# Patient Record
Sex: FEMALE | Race: WHITE | NOT HISPANIC OR LATINO | Employment: OTHER | URBAN - METROPOLITAN AREA
[De-identification: names, ages, dates, MRNs, and addresses within clinical notes are randomized per-mention and may not be internally consistent; named-entity substitution may affect disease eponyms.]

---

## 2017-01-01 ENCOUNTER — APPOINTMENT (OUTPATIENT)
Dept: OCCUPATIONAL THERAPY | Facility: CLINIC | Age: 77
DRG: 280 | End: 2017-01-01
Payer: MEDICARE

## 2017-01-02 ENCOUNTER — TELEPHONE (OUTPATIENT)
Dept: PHARMACY | Facility: OTHER | Age: 77
End: 2017-01-02

## 2017-01-02 NOTE — TELEPHONE ENCOUNTER
MTM referral from: Transitions of Care (recent hospital discharge or ED visit)    MTM referral outreach attempt #1 on January 2, 2017 at 11:02 AM      Outcome: Patient is not interested at this time because they are a non fv patient, will route to MTM Pharmacist/Provider as an FYI. Thank you for the referral.     Deborah Ortiz, MTM Coordinator

## 2017-01-03 ENCOUNTER — TELEPHONE (OUTPATIENT)
Dept: UROLOGY | Facility: CLINIC | Age: 77
End: 2017-01-03

## 2017-01-03 NOTE — TELEPHONE ENCOUNTER
"Called patient to discuss her concerns re: urinary frequency, urgency, incontinence, possible UTI. I initially saw her in clinic on 12/23/16 (see progress note for further details) at which time her UA appeared positive for a UTI. I had recommended initiating empiric treatment with antibiotics that day but patient was concerned about the cost of medication and elected to wait until final urine culture results were available to ensure appropriate antibiotic selection. In complying with her wishes, I had asked nursing staff to alert patient of need for Macrobid 100 mg PO BID x 7 days on 12/27/16 (see result note from urine culture from 12/23/16) when culture returned with >100,000 E. coli. It appears this message was not relayed to the patient until 12/28/16 at which time she then picked up the Macrobid and started taking it. Then on 12/29/16, she was admitted for acute CHF, possible acute anterior non-STEMI for which she was hospitalized through 1/1/17. A UA was checked on 12/29/16 which was actually completley normal so her Macrobid was stopped. She apparently received 3 days of IV ceftriaxone while hospitalized.     She calls today with complaints of continuing frequency, urgency, and a feeling of \"a bowling ball dropping from her abdomen when she stands up.\" As we had not performed any additional exam or workup per patient request at her previous office visit, I did recommend she return to clinic for further evaluation including a pelvic exam to look for possible pelvic organ prolapse. We are also awaiting medical records from [facility] that detail what medications she has previously tried for her urinary urgency, urge incontinence, urinary tract infections, to help guide further workup and treatment options. I also recommended that if she continues to have concern for a UTI, she should leave a new urine sample (optimally, a catheterized sample would be best) to check for presence of an " "infection. She may have this done at our clinic or any other [place] clinic, but the patient states she will \"just have it done when I see my primary doctor for follow up of my hospitalization.\" Informed patient to please have results faxed to us to review as well.    Recommend patient schedule a follow up visit to be seen, but she prefers to wait until records have been faxed and after she sees her primary doctor. She will call back when she is ready to schedule a follow up appointment. Patient verbalized understanding and agreement. All of her questions were answered.    Azra Arnold PA-C  Urology  "

## 2017-01-05 ENCOUNTER — TRANSFERRED RECORDS (OUTPATIENT)
Dept: CARDIOLOGY | Facility: CLINIC | Age: 77
End: 2017-01-05

## 2017-01-05 LAB
ALBUMIN SERPL-MCNC: 3.5 G/DL
ALP SERPL-CCNC: 92 U/L
ALT SERPL-CCNC: 51 U/L
ANION GAP SERPL CALCULATED.3IONS-SCNC: NORMAL MMOL/L
AST SERPL-CCNC: 44 U/L
BILIRUB SERPL-MCNC: 0.84 MG/DL
BNP SERPL-MCNC: 405 NUMERIC
BUN SERPL-MCNC: 15 MG/DL
CALCIUM SERPL-MCNC: 9.3 MG/DL
CHLORIDE SERPLBLD-SCNC: 101 MMOL/L
CO2 SERPL-SCNC: NORMAL MMOL/L
CREAT SERPL-MCNC: 1 MG/DL
ERYTHROCYTE [DISTWIDTH] IN BLOOD BY AUTOMATED COUNT: NORMAL %
GFR SERPL CREATININE-BSD FRML MDRD: NORMAL ML/MIN/1.73M2
GLUCOSE SERPL-MCNC: 130 MG/DL (ref 70–99)
HCT VFR BLD AUTO: 41.8 %
HEMOGLOBIN: 13.8 G/DL
MCH RBC QN AUTO: 28.2 PG
MCHC RBC AUTO-ENTMCNC: 33 G/DL
MCV RBC AUTO: 85.5 FL
PLATELET # BLD AUTO: 297 10^9/L
POTASSIUM SERPL-SCNC: 4.4 MMOL/L
PROT SERPL-MCNC: 7.9 G/DL
RBC # BLD AUTO: 4.89 10^12/L
SODIUM SERPL-SCNC: 139 MMOL/L
WBC # BLD AUTO: 10.9 10^9/L

## 2017-01-06 ENCOUNTER — TRANSFERRED RECORDS (OUTPATIENT)
Dept: CARDIOLOGY | Facility: CLINIC | Age: 77
End: 2017-01-06

## 2017-02-13 ENCOUNTER — TELEPHONE (OUTPATIENT)
Dept: CARDIOLOGY | Facility: CLINIC | Age: 77
End: 2017-02-13

## 2017-02-24 ENCOUNTER — TELEPHONE (OUTPATIENT)
Dept: CARDIOLOGY | Facility: CLINIC | Age: 77
End: 2017-02-24

## 2017-02-24 DIAGNOSIS — I50.33 ACUTE ON CHRONIC DIASTOLIC (CONGESTIVE) HEART FAILURE (H): ICD-10-CM

## 2017-02-24 RX ORDER — METOPROLOL TARTRATE 25 MG/1
25 TABLET, FILM COATED ORAL 2 TIMES DAILY
Qty: 60 TABLET | Refills: 0 | Status: SHIPPED | OUTPATIENT
Start: 2017-02-24 | End: 2017-05-04

## 2017-02-24 NOTE — TELEPHONE ENCOUNTER
Notified by scheduling that patient had rescheduled numerous times for her 1 month hospital f/u appointment due 2/1/17. Called patient and she said that she did not make it to previous appointments because she does not have a car. It costs her $12 by [employer] and sometimes she does not have the money. She said she has also rescheduled due to a yeast infection. She did not have $12 for her appointment today because she was planning on a snow storm. Said that she did f/u with her PCP with [facility]. She asked them about decreasing her Lasix dose, but she said they deferred to cardiology. Discharge weight was 293# and home weight is reportedly stable at 280#. She is not short of breath which she says was her main sx leading to hospitalization. Rescheduled her office with with Dr. Geller to next opening. Will refill her lopressor x 1 to get her to the office visit. Encouraged her to keep this appointment. Mary HORTON

## 2017-03-27 ENCOUNTER — PRE VISIT (OUTPATIENT)
Dept: CARDIOLOGY | Facility: CLINIC | Age: 77
End: 2017-03-27

## 2017-03-30 ENCOUNTER — OFFICE VISIT (OUTPATIENT)
Dept: CARDIOLOGY | Facility: CLINIC | Age: 77
End: 2017-03-30
Payer: MEDICARE

## 2017-03-30 VITALS
HEART RATE: 74 BPM | HEIGHT: 68 IN | OXYGEN SATURATION: 95 % | DIASTOLIC BLOOD PRESSURE: 72 MMHG | BODY MASS INDEX: 43.8 KG/M2 | SYSTOLIC BLOOD PRESSURE: 122 MMHG | WEIGHT: 289 LBS

## 2017-03-30 DIAGNOSIS — I50.33 ACUTE ON CHRONIC DIASTOLIC (CONGESTIVE) HEART FAILURE (H): ICD-10-CM

## 2017-03-30 DIAGNOSIS — I42.9 CARDIOMYOPATHY, UNSPECIFIED (H): Primary | ICD-10-CM

## 2017-03-30 PROCEDURE — 99214 OFFICE O/P EST MOD 30 MIN: CPT | Performed by: INTERNAL MEDICINE

## 2017-03-30 RX ORDER — FUROSEMIDE 20 MG
10 TABLET ORAL DAILY PRN
Qty: 30 TABLET | Refills: 3 | Status: SHIPPED | OUTPATIENT
Start: 2017-03-30 | End: 2017-05-04

## 2017-03-30 RX ORDER — DIPHENHYDRAMINE HYDROCHLORIDE 25 MG/1
TABLET ORAL
COMMUNITY
End: 2019-10-18

## 2017-03-30 RX ORDER — TRIAMCINOLONE ACETONIDE 1 MG/G
CREAM TOPICAL 2 TIMES DAILY
COMMUNITY
End: 2019-10-18

## 2017-03-30 RX ORDER — POTASSIUM CHLORIDE 750 MG/1
10 TABLET, EXTENDED RELEASE ORAL DAILY PRN
Qty: 60 TABLET | Refills: 11 | Status: SHIPPED | OUTPATIENT
Start: 2017-03-30 | End: 2017-05-04

## 2017-03-30 NOTE — MR AVS SNAPSHOT
After Visit Summary   3/30/2017    Savanna Rehman    MRN: [id number]           Patient Information     Date Of Birth          1940        Visit Information        Provider Department      3/30/2017 2:15 PM Dottie Geller MD [facility] HEART [facility]        Today's Diagnoses     Cardiomyopathy, unspecified (H)    -  1    Acute on chronic diastolic (congestive) heart failure (H)           Follow-ups after your visit        Additional Services     Follow-Up with Cardiac Advanced Practice Provider           Follow-Up with Cardiologist                 Your next 10 appointments already scheduled     Apr 04, 2017 12:45 PM CDT   LAB with RU LAB   [facility] ([facility])    [address]  [facility] [address] [phone]           Patient must bring picture ID.  Patient should be prepared to give a urine specimen  Please do not eat 10-12 hours before your appointment if you are coming in fasting for labs on lipids, cholesterol, or glucose (sugar).  Pregnant women should follow their Care Team instructions. Water with medications is okay. Do not drink coffee or other fluids.   If you have concerns about taking  your medications, please ask at office or if scheduling via [employer], send a message by clicking on Secure Messaging, Message Your Care Team.            Apr 04, 2017  1:15 PM CDT   XR CHEST 2 VIEWS with [id number]   [facility] ([facility])    [address]  [place] [place] [address] [phone]           Please bring a list of your current medicines to your exam. (Include vitamins, minerals and over-thecounter medicines.) Leave your valuables at home.  Tell your doctor if there is a chance you may be pregnant.  You do not need to do anything special for this exam.            Apr 04, 2017  1:50 PM CDT   Return Visit with Maral LAIRD  ANNA Lora   [facility] PHYS[facility] HEART AT [place] ([facility])    [address]  [facility] [id number] [phone]            Apr 06, 2017  1:00 PM CDT   MR MYOCARDIUM W CONTRAST with SCIMR1   [facility] (Cardiovascular Imaging at [facility])    [address]  [address]  [place] [address] [phone]           Take your medicines as usual, unless your doctor tells you not to. Bring a list of your current medicines to your exam (including vitamins, minerals and over-the-counter drugs).  You will be given intravenous contrast for this exam. To prepare:   The day before your exam, drink extra fluids at least six 8-ounce glasses (unless your doctor tells you to restrict your fluids).   Have a blood test (creatinine test) within 30 days of your exam. Go to your clinic or Diagnostic Imaging Department for this test.  The MRI machine uses a strong magnet. Please wear clothes without metal (snaps, zippers). A sweatsuit works well, or we may give you a hospital gown.  Please remove any body piercings and hair extensions before you arrive. You will also remove watches, jewelry, hairpins, wallets, dentures, partial dental plates and hearing aids. You may wear contact lenses, and you may be able to wear your rings. We have a safe place to keep your personal items, but it is safer to leave them at home.   **IMPORTANT** THE INSTRUCTIONS BELOW ARE ONLY FOR THOSE PATIENTS WHO HAVE BEEN TOLD THEY WILL RECEIVE SEDATION OR GENERAL ANESTHESIA DURING THEIR MRI PROCEDURE:  IF YOU WILL RECEIVE SEDATION (take medicine to help you relax during your exam):   You must get the medicine from your doctor before you arrive. Bring the medicine to the exam. Do not take it at home.   Arrive one hour early. Bring someone who can take you home after the test. Your medicine will make you sleepy. After the exam, you may not drive, take a bus or take a taxi  by yourself.   No eating 8 hours before your exam. You may have clear liquids up until 4 hours before your exam. (Clear liquids include water, clear tea, black coffee and fruit juice without pulp.)  IF YOU WILL RECEIVE ANESTHESIA (be asleep for your exam):   Arrive 1 1/2 hours early. Bring someone who can take you home after the test. You may not drive, take a bus or take a taxi by yourself.   No eating 8 hours before your exam. You may have clear liquids up until 4 hours before your exam. (Clear liquids include water, clear tea, black coffee and fruit juice without pulp.)  Please call the Imaging Department at your exam site with any questions.            Apr 14, 2017  8:15 AM CDT   Return Visit with Dottie Geller MD   [facility] PHYSICIANS HEART AT [place] ([facility])    [address] [place] [address] [phone]              Future tests that were ordered for you today     Open Future Orders        Priority Expected Expires Ordered    X-ray Chest 2 vws* Routine 3/30/2017 3/30/2018 3/30/2017    Follow-Up with Cardiologist Routine 4/10/2017 3/30/2019 3/30/2017    Follow-Up with Cardiac Advanced Practice Provider Routine 4/3/2017 3/30/2019 3/30/2017    Basic metabolic panel Routine 4/3/2017 3/30/2019 3/30/2017    MRI Cardiac w/contrast Routine 3/31/2017 3/30/2018 3/30/2017            Who to contact     If you have questions or need follow up information about today's clinic visit or your schedule please contact [facility] PHYS[facility] HE[facility] AT [place] directly at [phone].  Normal or non-critical lab and imaging results will be communicated to you by MyChart, letter or phone within 4 business days after the clinic has received the results. If you do not hear from us within 7 days, please contact the clinic through MyChart or phone. If you have a critical or abnormal lab result, we will notify you by phone as soon as possible.  Submit refill  "requests through [employer] or call your pharmacy and they will forward the refill request to us. Please allow 3 business days for your refill to be completed.          Additional Information About Your Visit        [employer] Information     [employer] lets you send messages to your doctor, view your test results, renew your prescriptions, schedule appointments and more. To sign up, go to [email or web address] . Click on \"Log in\" on the left side of the screen, which will take you to the Welcome page. Then click on \"Sign up Now\" on the right side of the page.     You will be asked to enter the access code listed below, as well as some personal information. Please follow the directions to create your username and password.     Your access code is: [email or web address]  Expires: 2017  2:06 PM     Your access code will  in 90 days. If you need help or a new code, please call your [place] clinic or [phone].        Care EveryWhere ID     This is your Care EveryWhere ID. This could be used by other organizations to access your [place] medical records  [id number]        Your Vitals Were     Pulse Height Pulse Oximetry BMI (Body Mass Index)          74 1.727 m (5' 8\") 95% 43.94 kg/m2         Blood Pressure from Last 3 Encounters:   17 122/72   17 139/53   08/25/15 186/78    Weight from Last 3 Encounters:   17 131.1 kg (289 lb)   16 132.8 kg (292 lb 11.2 oz)   16 127 kg (280 lb)                 Today's Medication Changes          These changes are accurate as of: 3/30/17  4:05 PM.  If you have any questions, ask your nurse or doctor.               Start taking these medicines.        Dose/Directions    potassium chloride SA 10 MEQ CR tablet   Commonly known as:  K-DUR/KLOR-CON M   Used for:  Cardiomyopathy, unspecified (H)   Started by:  Dottie Geller MD        Dose:  10 mEq   Take 1 tablet (10 mEq) by mouth daily as needed for potassium supplementation (when take Lasix) "   Quantity:  60 tablet   Refills:  11         These medicines have changed or have updated prescriptions.        Dose/Directions    furosemide 20 MG tablet   Commonly known as:  LASIX   This may have changed:    - how much to take  - when to take this  - reasons to take this  - additional instructions   Used for:  Acute on chronic diastolic (congestive) heart failure (H)   Changed by:  Dottie Geller MD        Dose:  10 mg   Take 0.5 tablets (10 mg) by mouth daily as needed For weight gain and dyspnea   Quantity:  30 tablet   Refills:  3            Where to get your medicines      These medications were sent to [facility] [address] [facility] [address] [place] [address]  [address], [facility] [address]     Phone:  [phone]     furosemide 20 MG tablet    potassium chloride SA 10 MEQ CR tablet                Primary Care Provider Office Phone # Fax #    Hilary Finney [phone]       [facility] [address] [facility] [address]        Thank you!     Thank you for choosing [facility] PHYSIC[facility] HEART AT [place]  for your care. Our goal is always to provide you with excellent care. Hearing back from our patients is one way we can continue to improve our services. Please take a few minutes to complete the written survey that you may receive in the mail after your visit with us. Thank you!             Your Updated Medication List - Protect others around you: Learn how to safely use, store and throw away your medicines at www.disposemymeds.org.          This list is accurate as of: 3/30/17  4:05 PM.  Always use your most recent med list.                   Brand Name Dispense Instructions for use    ADVIL PO      Take 400 mg by mouth every 8 hours as needed for moderate pain       BIOTIN 5000 5 MG Caps   Generic drug:  biotin          furosemide 20 MG tablet    LASIX    30 tablet    Take 0.5 tablets (10  mg) by mouth daily as needed For weight gain and dyspnea       glipiZIDE 2.5 MG 24 hr tablet    GLUCOTROL XL     Take 2.5 mg by mouth daily       LORazepam 0.5 MG tablet    ATIVAN    12 tablet    Take 1 tablet (0.5 mg) by mouth every 4 hours as needed for anxiety or other (chest pain)       metoprolol 25 MG tablet    LOPRESSOR    60 tablet    Take 1 tablet (25 mg) by mouth 2 times daily       OXYBUTYNIN CHLORIDE PO      Take 5 mg by mouth 2 times daily Clarified as immediate release with [facility]./Patient       potassium chloride SA 10 MEQ CR tablet    K-DUR/KLOR-CON M    60 tablet    Take 1 tablet (10 mEq) by mouth daily as needed for potassium supplementation (when take Lasix)       pravastatin 20 MG tablet    PRAVACHOL     Take 20 mg by mouth At Bedtime       ranitidine 150 MG capsule    ZANTAC     Take by mouth 2 times daily       triamcinolone 0.1 % cream    KENALOG     Apply topically 2 times daily       * WARFARIN SODIUM PO      Take 2.5 mg by mouth Take on Mon, Tues, Wed, Thurs, Fri, Sat (6 days a week)       * WARFARIN SODIUM PO      Take 5 mg by mouth on Sundays       * Notice:  This list has 2 medication(s) that are the same as other medications prescribed for you. Read the directions carefully, and ask your doctor or other care provider to review them with you.

## 2017-03-30 NOTE — LETTER
3/30/2017    Hilary Finney MD  [facility]   [address]  [place] [address]    RE: Savanna Rehman       Dear Colleague,    I had the pleasure of seeing Savanna Rehman in the [facility].    REASON FOR VISIT:  New consultation following a hospitalization for hypoxemic and hypercapnic respiratory failure.      Ms. Rehman is a pleasant 76-year-old woman who is seen following a hospitalization originally with chronic atrial fibrillation with subtherapeutic INR on warfarin anticoagulation.  I had seen her when she was short of breath at presentation, was hypoxemic and had rapid ventricular response with elevated cardiac troponins.  There was new mid to distal anteroseptal hypokinesis described relative to prior to echocardiogram in 2015.  Cardiac catheterization was undertaken which showed no evidence of obstructive disease; however, it also confirmed the wall motion abnormalities on echo.      She has been doing well since discharge on medical therapy but just over the past few days has noticed shortness of breath on exertion and increased pedal edema.  She does not note a clear change in her weight on her home scale but there is an increase here.  She has had no chest discomfort.  She has no orthopnea or lightheadedness.  She has had certainly no syncope but does complain of some imbalance in the form of vertigo.  Again, she is short of breath on exertion and walks to the elevator and the parking lot at home and is very short of breath.      She does state that she had prior chest tightness but the Lorazepam helps.     Outpatient Encounter Prescriptions as of 3/30/2017   Medication Sig Dispense Refill     triamcinolone (KENALOG) 0.1 % cream Apply topically 2 times daily       biotin (BIOTIN 5000) 5 MG CAPS        [DISCONTINUED] potassium chloride SA (K-DUR/KLOR-CON M) 10 MEQ CR tablet Take 1 tablet (10 mEq) by mouth daily as needed for potassium supplementation  (when take Lasix) 60 tablet 11     [DISCONTINUED] furosemide (LASIX) 20 MG tablet Take 0.5 tablets (10 mg) by mouth daily as needed For weight gain and dyspnea 30 tablet 3     [DISCONTINUED] metoprolol (LOPRESSOR) 25 MG tablet Take 1 tablet (25 mg) by mouth 2 times daily 60 tablet 0     ranitidine (ZANTAC) 150 MG capsule Take by mouth 2 times daily       pravastatin (PRAVACHOL) 20 MG tablet Take 20 mg by mouth At Bedtime   1     glipiZIDE (GLUCOTROL XL) 2.5 MG 24 hr tablet Take 2.5 mg by mouth daily  1     WARFARIN SODIUM PO Take 2.5 mg by mouth Take on Mon, Tues, Wed, Thurs, Fri, Sat (6 days a week)       WARFARIN SODIUM PO Take 5 mg by mouth on Sundays       Ibuprofen (ADVIL PO) Take 400 mg by mouth every 8 hours as needed for moderate pain       LORazepam (ATIVAN) 0.5 MG tablet Take 1 tablet (0.5 mg) by mouth every 4 hours as needed for anxiety or other (chest pain) 12 tablet 0     OXYBUTYNIN CHLORIDE PO Take 5 mg by mouth 2 times daily Clarified as immediate release with [facility]./Patient       [DISCONTINUED] furosemide (LASIX) 20 MG tablet Take 1 tablet (20 mg) by mouth daily 30 tablet 0     [DISCONTINUED] vitamin D (ERGOCALCIFEROL) 05498 UNIT capsule Take 50,000 Units by mouth once a week       [DISCONTINUED] EPINEPHrine (EPIPEN 2-ROBBY) 0.3 MG/0.3ML injection Inject 0.3 mLs (0.3 mg) into the muscle once as needed for anaphylaxis 1 each 1     Facility-Administered Encounter Medications as of 3/30/2017   Medication Dose Route Frequency Provider Last Rate Last Dose     nitroglycerin 100 MCG/ML injection              [DISCONTINUED] verapamil (ISOPTIN) 2.5 MG/ML injection              [DISCONTINUED] heparin (porcine) 1000 UNIT/ML injection              [DISCONTINUED] fentaNYL Citrate (PF) (SUBLIMAZE) 100 MCG/2ML injection               ASSESSMENT AND PLAN:  Pleasant 76-year-old lady with diastolic dysfunction, chronic atrial fibrillation with apparent rate control, morbid obesity, fibromyalgia, sleep apnea on CPAP,  type 2 diabetes, dyslipidemia.      She has congestive heart failure with preserved systolic function overall, but did have a wall motion abnormality that was new on most recent investigation in the setting of an elevated troponin greater than 1.  We were going to order a followup echo after her hospitalization, but I think at this point it would be reasonable to check a cardiac MRI.  Pending the results, we could decide whether we need to do a transesophageal echo to evaluate for sources of emboli in the setting of her atrial fibrillation or structural sources of emboli as her INR was not in range when she presented and the mechanism of her new wall motion abnormality is unclear.  The MRI will evaluate for infiltrative disease or mike infarction and reassess her ejection fraction.  Another possibility of an infarct is found, of course, is coronary vasospasm.  At the current time, Ms. Rehman is on a beta blocker, low intensity statin as tolerated and warfarin and has had no chest pain related complaints.  Her blood pressure is at goal range.      She has increased fluid it appears and as she has not been taking her Lasix I would have her start at 10 mg a day alternating by 20 for 4-5 days until next week with accompanying Hanna-Ciromero 10 mEq the days that she is taking Lasix.  We will follow up a BNP on Monday or Tuesday with a nurse practitioner visit that day.  She is reticent to try to take the 20 mg of Lasix a day because of the need to run to the bathroom and so to accommodate I have agreed to her 10 mg request but asked if she can alternate with 20.      Further recommendations will be pending the results of her cardiac MRI, however, and I will have her follow up with me to make further plans.      It is a pleasure being involved in her care.      Total time is 30 minutes, 25 in coordination of care and counseling.     Sincerely,    Dottie Geller MD     [facility]

## 2017-04-03 NOTE — PROGRESS NOTES
HPI and Plan:   See dictation    Orders Placed This Encounter   Procedures     MRI Cardiac w/contrast     X-ray Chest 2 vws*     Basic metabolic panel     Follow-Up with Cardiac Advanced Practice Provider     Follow-Up with Cardiologist       Orders Placed This Encounter   Medications     triamcinolone (KENALOG) 0.1 % cream     Sig: Apply topically 2 times daily     biotin (BIOTIN 5000) 5 MG CAPS     potassium chloride SA (K-DUR/KLOR-CON M) 10 MEQ CR tablet     Sig: Take 1 tablet (10 mEq) by mouth daily as needed for potassium supplementation (when take Lasix)     Dispense:  60 tablet     Refill:  11     furosemide (LASIX) 20 MG tablet     Sig: Take 0.5 tablets (10 mg) by mouth daily as needed For weight gain and dyspnea     Dispense:  30 tablet     Refill:  3       Medications Discontinued During This Encounter   Medication Reason     vitamin D (ERGOCALCIFEROL) 21690 UNIT capsule Therapy completed     EPINEPHrine (EPIPEN 2-ROBBY) 0.3 MG/0.3ML injection Therapy completed     furosemide (LASIX) 20 MG tablet Reorder         Encounter Diagnoses   Name Primary?     Cardiomyopathy, unspecified (H) Yes     Acute on chronic diastolic (congestive) heart failure (H)        CURRENT MEDICATIONS:  Current Outpatient Prescriptions   Medication Sig Dispense Refill     triamcinolone (KENALOG) 0.1 % cream Apply topically 2 times daily       biotin (BIOTIN 5000) 5 MG CAPS        potassium chloride SA (K-DUR/KLOR-CON M) 10 MEQ CR tablet Take 1 tablet (10 mEq) by mouth daily as needed for potassium supplementation (when take Lasix) 60 tablet 11     furosemide (LASIX) 20 MG tablet Take 0.5 tablets (10 mg) by mouth daily as needed For weight gain and dyspnea 30 tablet 3     metoprolol (LOPRESSOR) 25 MG tablet Take 1 tablet (25 mg) by mouth 2 times daily 60 tablet 0     ranitidine (ZANTAC) 150 MG capsule Take by mouth 2 times daily       pravastatin (PRAVACHOL) 20 MG tablet Take 20 mg by mouth At Bedtime   1     glipiZIDE (GLUCOTROL XL)  2.5 MG 24 hr tablet Take 2.5 mg by mouth daily  1     WARFARIN SODIUM PO Take 2.5 mg by mouth Take on Mon, Tues, Wed, Thurs, Fri, Sat (6 days a week)       WARFARIN SODIUM PO Take 5 mg by mouth on Sundays       Ibuprofen (ADVIL PO) Take 400 mg by mouth every 8 hours as needed for moderate pain       LORazepam (ATIVAN) 0.5 MG tablet Take 1 tablet (0.5 mg) by mouth every 4 hours as needed for anxiety or other (chest pain) 12 tablet 0     OXYBUTYNIN CHLORIDE PO Take 5 mg by mouth 2 times daily Clarified as immediate release with [facility]./Patient         ALLERGIES     Allergies   Allergen Reactions     Petroleum Jelly [Petrolatum] Anaphylaxis     Rash and swelling     Shellfish-Derived Products Anaphylaxis     Tongue swelling     Aspirin Swelling     tiongue swelling     Bacitracin      Rash swelling     Coumadin [Warfarin] Swelling     Leg swelling     Darvon [Propoxyphene] Swelling     Throat closes     Dilaudid [Hydromorphone]      Levaquin [Levofloxacin] Swelling     Tongue swelling     Neosporin [Neomycin-Polymyx-Gramicid] Swelling     rash     Oxycodone      Severe itching     Percodan [Oxycodone-Aspirin]      Severe itching     Tramadol      Vicodin [Hydrocodone-Acetaminophen]      Severe itching       Xarelto [Rivaroxaban]      Adhesive Tape Rash     Band aids      Codeine Rash       PAST MEDICAL HISTORY:  Past Medical History:   Diagnosis Date     Anemia     Iron Deficiency anemia     Arrhythmia     Afib     Atrial fibrillation (H)      CAD (coronary artery disease)     non-obstructive     Congestive heart failure (H)      Degenerative disk disease      Fibromyalgia      Gastro-oesophageal reflux disease      Hiatal hernia      History of blood transfusion      Neuropathy (H)      Other chronic pain     neck, low back, legs     Pernicious anemia      Sleep apnea     uses CPAP.     Urinary incontinence      Vitamin D deficiency        PAST SURGICAL HISTORY:  Past Surgical History:   Procedure Laterality Date      APPENDECTOMY       C RAD RESEC TONSIL/PILLARS       CHOLECYSTECTOMY       COLONOSCOPY  3/15/2011     CORONARY ANGIOGRAPHY ADULT ORDER       HEART CATH LEFT HEART CATH  12/30/16    medication management     HYSTERECTOMY TOTAL ABDOMINAL       KNEE SURGERY Left      LAPAROSCOPIC NISSEN FUNDOPLICATION N/A 2/4/2015    Procedure: LAPAROSCOPIC NISSEN FUNDOPLICATION;  Surgeon: Armando Ansari MD;  Location:  OR     ORTHOPEDIC SURGERY      joint replacement     ORTHOPEDIC SURGERY Left     knee replacement     ORTHOPEDIC SURGERY      ulnar transposition     SOFT TISSUE SURGERY Left     lt elbow mass, ulnar transposition       FAMILY HISTORY:  Family History   Problem Relation Age of Onset     Unknown/Adopted No family hx of        SOCIAL HISTORY:  Social History     Social History     Marital status:      Spouse name: N/A     Number of children: N/A     Years of education: N/A     Social History Main Topics     Smoking status: Former Smoker     Types: Cigarettes     Start date: 9/1/2014     Smokeless tobacco: None     Alcohol use Yes      Comment: socially.     Drug use: None     Sexual activity: Not Asked     Other Topics Concern     None     Social History Narrative       Review of Systems:  Skin:  Positive for   dry, extra dryness on elbows    Eyes:  Positive for glasses    ENT:  Negative      Respiratory:  Positive for dyspnea on exertion;cough;sleep apnea dry cough, does not use machine    Cardiovascular:    dizziness;Positive for;edema    Gastroenterology: Negative      Genitourinary:  Positive for urinary frequency lasix  Musculoskeletal:  Positive for back pain;arthritis;joint pain    Neurologic:  Positive for numbness or tingling of feet    Psychiatric:  Positive for excessive stress    Heme/Lymph/Imm:  Positive for easy bruising    Endocrine:  Positive for diabetes      Physical Exam:  Vitals: /72 (BP Location: Other (Comment), Patient Position: Chair, Cuff Size: Adult Small)  Pulse 74  Ht 1.727  "m (5' 8\")  Wt 131.1 kg (289 lb)  SpO2 95%  BMI 43.94 kg/m2    Constitutional:  cooperative, alert and oriented, well developed, well nourished, in no acute distress        Skin:  warm and dry to the touch, no apparent skin lesions or masses noted        Head:  normocephalic, no masses or lesions        Eyes:  pupils equal and round, conjunctivae and lids unremarkable, sclera white, no xanthalasma, EOMS intact, no nystagmus        ENT:  no pallor or cyanosis        Neck:  carotid pulses are full and equal bilaterally   JVP not well visualized    Chest:        crackles right base and ?decr BS left base    Cardiac: regular rhythm, normal S1/S2, no S3 or S4, apical impulse not displaced, no murmurs, gallops or rubs                  Abdomen:  abdomen soft;BS normoactive        Vascular: pulses full and equal                                        Extremities and Back:  no deformities, clubbing, cyanosis, erythema observed   bilateral LE edema;pitting;1+          Neurological:  no gross motor deficits;affect appropriate, oriented to time, person and place              CC  No referring provider defined for this encounter.              "

## 2017-04-03 NOTE — PROGRESS NOTES
REASON FOR VISIT:  New consultation following a hospitalization for hypoxemic and hypercapnic respiratory failure.      HISTORY OF PRESENT ILLNESS:  Ms. Rehman is a pleasant 76-year-old woman who is seen following a hospitalization originally with chronic atrial fibrillation with subtherapeutic INR on warfarin anticoagulation.  I had seen her when she was short of breath at presentation, was hypoxemic and had rapid ventricular response with elevated cardiac troponins.  There was new mid to distal anteroseptal hypokinesis described relative to prior to echocardiogram in 2015.  Cardiac catheterization was undertaken which showed no evidence of obstructive disease; however, it also confirmed the wall motion abnormalities on echo.      She has been doing well since discharge on medical therapy but just over the past few days has noticed shortness of breath on exertion and increased pedal edema.  She does not note a clear change in her weight on her home scale but there is an increase here.  She has had no chest discomfort.  She has no orthopnea or lightheadedness.  She has had certainly no syncope but does complain of some imbalance in the form of vertigo.  Again, she is short of breath on exertion and walks to the elevator and the parking lot at home and is very short of breath.      She does state that she had prior chest tightness but the Lorazepam helps.      ASSESSMENT AND PLAN:  Jose 76-year-old lady with diastolic dysfunction, chronic atrial fibrillation with apparent rate control, morbid obesity, fibromyalgia, sleep apnea on CPAP, type 2 diabetes, dyslipidemia.      She has congestive heart failure with preserved systolic function overall, but did have a wall motion abnormality that was new on most recent investigation in the setting of an elevated troponin greater than 1.  We were going to order a followup echo after her hospitalization, but I think at this point it would be reasonable to check a cardiac  MRI.  Pending the results, we could decide whether we need to do a transesophageal echo to evaluate for sources of emboli in the setting of her atrial fibrillation or structural sources of emboli as her INR was not in range when she presented and the mechanism of her new wall motion abnormality is unclear.  The MRI will evaluate for infiltrative disease or mike infarction and reassess her ejection fraction.  Another possibility of an infarct is found, of course, is coronary vasospasm.  At the current time, Ms. Sanderson is on a beta blocker, low intensity statin as tolerated and warfarin and has had no chest pain related complaints.  Her blood pressure is at goal range.      She has increased fluid it appears and as she has not been taking her Lasix I would have her start at 10 mg a day alternating by 20 for 4-5 days until next week with accompanying Hanna-Ciel 10 mEq the days that she is taking Lasix.  We will follow up a BNP on Monday or Tuesday with a nurse practitioner visit that day.  She is reticent to try to take the 20 mg of Lasix a day because of the need to run to the bathroom and so to accommodate I have agreed to her 10 mg request but asked if she can alternate with 20.      Further recommendations will be pending the results of her cardiac MRI, however, and I will have her follow up with me to make further plans.      It is a pleasure being involved in her care.      Total time is 30 minutes, 25 in coordination of care and counseling.      Arabella Geller MD      cc:   [place] [address]         ARABELLA GELLER MD             D: 2017 06:24   T: 2017 07:04   MT: [email or web address]      Name:     KRIS SANDERSON   MRN:      [id number]        Account:      [id number]   :      1940           Service Date: 2017      Document: [id number]

## 2017-04-12 ENCOUNTER — HOSPITAL ENCOUNTER (OUTPATIENT)
Dept: GENERAL RADIOLOGY | Facility: CLINIC | Age: 77
Discharge: HOME | End: 2017-04-12
Attending: INTERNAL MEDICINE | Admitting: INTERNAL MEDICINE
Payer: MEDICARE

## 2017-04-12 DIAGNOSIS — I42.9 CARDIOMYOPATHY, UNSPECIFIED (H): ICD-10-CM

## 2017-04-12 DIAGNOSIS — I50.33 ACUTE ON CHRONIC DIASTOLIC (CONGESTIVE) HEART FAILURE (H): ICD-10-CM

## 2017-04-12 LAB
ANION GAP SERPL CALCULATED.3IONS-SCNC: 8 MMOL/L (ref 3–14)
BUN SERPL-MCNC: 12 MG/DL (ref 7–30)
CALCIUM SERPL-MCNC: 8.8 MG/DL (ref 8.5–10.1)
CHLORIDE SERPL-SCNC: 101 MMOL/L (ref 94–109)
CO2 SERPL-SCNC: 30 MMOL/L (ref 20–32)
CREAT SERPL-MCNC: 0.88 MG/DL (ref 0.52–1.04)
GFR SERPL CREATININE-BSD FRML MDRD: 63 ML/MIN/1.7M2
GLUCOSE SERPL-MCNC: 137 MG/DL (ref 70–99)
POTASSIUM SERPL-SCNC: 4.3 MMOL/L (ref 3.4–5.3)
SODIUM SERPL-SCNC: 139 MMOL/L (ref 133–144)

## 2017-04-12 PROCEDURE — 36415 COLL VENOUS BLD VENIPUNCTURE: CPT | Performed by: INTERNAL MEDICINE

## 2017-04-12 PROCEDURE — 71020 XR CHEST 2 VW: CPT

## 2017-04-12 PROCEDURE — 80048 BASIC METABOLIC PNL TOTAL CA: CPT | Performed by: INTERNAL MEDICINE

## 2017-04-13 ENCOUNTER — TELEPHONE (OUTPATIENT)
Dept: CARDIOLOGY | Facility: CLINIC | Age: 77
End: 2017-04-13

## 2017-04-13 NOTE — TELEPHONE ENCOUNTER
Pt. Called to inform our clinic that she was unable to complete her cardiac MRI due to severe claustrophobia and back issues.  She states she was under the impression that she would be able to be sedated to under got the test but found once she arrived for the test that this was not possible.  Pt. Wondering if she should still keep her OV appointment with Dr. Geller tomorrow, advised pt. To keep appointment and she can discuss with Dr. GAMEZ what/if alternative testing can be completed. Pt. In agreement, will keep OV appointment.

## 2017-04-14 ENCOUNTER — OFFICE VISIT (OUTPATIENT)
Dept: CARDIOLOGY | Facility: CLINIC | Age: 77
End: 2017-04-14
Attending: INTERNAL MEDICINE
Payer: MEDICARE

## 2017-04-14 VITALS
DIASTOLIC BLOOD PRESSURE: 70 MMHG | BODY MASS INDEX: 43.95 KG/M2 | HEIGHT: 68 IN | HEART RATE: 68 BPM | SYSTOLIC BLOOD PRESSURE: 130 MMHG | WEIGHT: 290 LBS

## 2017-04-14 DIAGNOSIS — I42.9 CARDIOMYOPATHY, UNSPECIFIED (H): ICD-10-CM

## 2017-04-14 DIAGNOSIS — R50.9 FEVER AND CHILLS: ICD-10-CM

## 2017-04-14 DIAGNOSIS — I50.33 ACUTE ON CHRONIC DIASTOLIC (CONGESTIVE) HEART FAILURE (H): ICD-10-CM

## 2017-04-14 DIAGNOSIS — R06.09 DYSPNEA ON EXERTION: Primary | ICD-10-CM

## 2017-04-14 DIAGNOSIS — R06.09 DYSPNEA ON EXERTION: ICD-10-CM

## 2017-04-14 LAB
BASOPHILS # BLD AUTO: 0 10E9/L (ref 0–0.2)
BASOPHILS NFR BLD AUTO: 0.6 %
DIFFERENTIAL METHOD BLD: ABNORMAL
EOSINOPHIL # BLD AUTO: 0.4 10E9/L (ref 0–0.7)
EOSINOPHIL NFR BLD AUTO: 5.6 %
ERYTHROCYTE [DISTWIDTH] IN BLOOD BY AUTOMATED COUNT: 15.7 % (ref 10–15)
HCT VFR BLD AUTO: 40.3 % (ref 35–47)
HGB BLD-MCNC: 13 G/DL (ref 11.7–15.7)
IMM GRANULOCYTES # BLD: 0 10E9/L (ref 0–0.4)
IMM GRANULOCYTES NFR BLD: 0.3 %
LYMPHOCYTES # BLD AUTO: 1.9 10E9/L (ref 0.8–5.3)
LYMPHOCYTES NFR BLD AUTO: 28.2 %
MCH RBC QN AUTO: 28.3 PG (ref 26.5–33)
MCHC RBC AUTO-ENTMCNC: 32.3 G/DL (ref 31.5–36.5)
MCV RBC AUTO: 88 FL (ref 78–100)
MONOCYTES # BLD AUTO: 0.7 10E9/L (ref 0–1.3)
MONOCYTES NFR BLD AUTO: 9.9 %
NEUTROPHILS # BLD AUTO: 3.7 10E9/L (ref 1.6–8.3)
NEUTROPHILS NFR BLD AUTO: 55.4 %
NRBC # BLD AUTO: 0 10*3/UL
NRBC BLD AUTO-RTO: 0 /100
PLATELET # BLD AUTO: 229 10E9/L (ref 150–450)
RBC # BLD AUTO: 4.6 10E12/L (ref 3.8–5.2)
WBC # BLD AUTO: 6.6 10E9/L (ref 4–11)

## 2017-04-14 PROCEDURE — 87040 BLOOD CULTURE FOR BACTERIA: CPT | Performed by: INTERNAL MEDICINE

## 2017-04-14 PROCEDURE — 85025 COMPLETE CBC W/AUTO DIFF WBC: CPT | Performed by: INTERNAL MEDICINE

## 2017-04-14 PROCEDURE — 36415 COLL VENOUS BLD VENIPUNCTURE: CPT | Performed by: INTERNAL MEDICINE

## 2017-04-14 PROCEDURE — 99214 OFFICE O/P EST MOD 30 MIN: CPT | Performed by: INTERNAL MEDICINE

## 2017-04-14 NOTE — PROGRESS NOTES
HISTORY OF PRESENT ILLNESS:  Ms. Rehman is a very pleasant, 76-year-old lady who comes in routine followup.  She has chronic atrial fibrillation and was hospitalized last year with a subtherapeutic INR on warfarin anticoagulation.  She was short of breath and hypoxemic with elevated troponins and rapid ventricular response.  She had a troponin, which elevated only to 1.  She had had no chest discomfort in the hospital, but stated that when being taken into EMS, she had a fleeting chest discomfort that went through to her back.  She has noticed persistent shortness of breath since that hospitalization, though no worse since then.  She also has lower extremity edema, which she states is unchanged.  She has difficulty taking diuretics because of frequent recurrent urinary tract infections and incontinence.  She has not been taking her diuretic at home for this reason.      During the hospitalization, she was found to have a new mid to distal anteroseptal hypokinesis, and cardiac catheterization showed no evidence of obstructive disease, but also confirmed the wall motion abnormality on echo.      She has had no chest pain, no presyncope or syncope.  She denies PND, orthopnea or significant palpitations.  We had tried to send her for an MRI in followup of her new wall motion abnormality to determine whether this represented scar or infiltrative process or had normalized.  Unfortunately, she was intolerant due to anxiety.       ALLERGIES:  Multiple narcotics.      ASSESSMENT AND PLAN:  A pleasant, 76-year-old lady with diastolic dysfunction, chronic AFib with apparent rate control, morbid obesity, fibromyalgia, sleep apnea on CPAP and type 2 diabetes as well as dyslipidemia.  She had a recent apparent myocardial infarction without evidence of obstructive coronary disease.      During her hospitalization, she was subtherapeutic from the standpoint of her anticoagulation, and conceivably an infarct could have occurred  from thromboembolism on the basis of her atrial fib.  Certainly, she could have embolization of vegetation, for instance.  She states she has had recurrent urinary tract infections and a week and a half ago had fevers and malaise and awakening with night sweats.  She did have blood cultures in the hospital in December, which were negative, but clearly predated these fevers and night sweats.      A vasospasm could also potentially cause an infarct.  My hope is that this was a form of Takotsubo cardiomyopathy and that we will find normalization on a subsequent echo.  I will go ahead and order the echo, as we cannot order the MRI, and make further recommendations pending the results.  Given the potential for embolic phenomena, we have discussed transesophageal echocardiography, including the risk of esophageal perforation as well as respiratory decompensation with her severe sleep apnea.  Ms. Rehman describes a very substantial gag reflex and states that she cannot eat M&Ms because when they hit the back of her throat, she can vomit.  She is also allergic to multiple narcotics and is reticent to use fentanyl.  She has chronic pain from her back pain and fibromyalgia, but is not on chronic narcotics with the allergies as outlined above.        She also had a hiatal hernia repair and partial fundoplication with Dr. Ansari several years ago.  She has not had any difficulty swallowing since then, and her preoperative EGD showed no esophageal pathology, just the hiatal hernia.      I have ordered pulmonary function tests, as she has not had any in the setting of her exertional dyspnea, which she described as unchanged.  We will repeat the echocardiogram as mentioned to reassess wall motion abnormalities and consider a MAYRA if the abnormality is persistent.  I have considered a nuclear imaging test as well, but there is a substantial potential for breast attenuation artifact.      We have discussed resuming the Lasix, as  she has only taken 2 tablets since her last visit; however, she is very limited by her bladder difficulties.  She will continue taking it as needed, but she agrees to restrict her sodium intake to less than 2 g a day, reading labels.      From the standpoint of her fevers and chills, we will check a CBC with differential as well as blood cultures with request to hold them for HACEK organisms.  We may end up proceeding to transesophageal echo as outlined above.      Further recommendations will be pending the results.  It is a pleasure being involved in her care.      Total time is 30 minutes, 25 in coordination of care and counseling.      cc:   [place] [address]         ARABELLA SALAZAR MD             D: 2017 10:17   T: 2017 11:02   MT: [email or web address]      Name:     KRIS SANDERSON   MRN:      [id number]        Account:      [id number]   :      1940           Service Date: 2017      Document: [id number]

## 2017-04-14 NOTE — LETTER
4/14/2017    Hilary Finney MD  [facility]   [address]  [facility] [address]    RE: Savanna Rehman       Dear Colleague,    I had the pleasure of seeing Savanna Rehman in the [facility].    Ms. Rehman is a very pleasant, 76-year-old lady who comes in routine followup.  She has chronic atrial fibrillation and was hospitalized last year with a subtherapeutic INR on warfarin anticoagulation.  She was short of breath and hypoxemic with elevated troponins and rapid ventricular response.  She had a troponin, which elevated only to 1.  She had had no chest discomfort in the hospital, but stated that when being taken into EMS, she had a fleeting chest discomfort that went through to her back.  She has noticed persistent shortness of breath since that hospitalization, though no worse since then.  She also has lower extremity edema, which she states is unchanged.  She has difficulty taking diuretics because of frequent recurrent urinary tract infections and incontinence.  She has not been taking her diuretic at home for this reason.      During the hospitalization, she was found to have a new mid to distal anteroseptal hypokinesis, and cardiac catheterization showed no evidence of obstructive disease, but also confirmed the wall motion abnormality on echo.      She has had no chest pain, no presyncope or syncope.  She denies PND, orthopnea or significant palpitations.  We had tried to send her for an MRI in followup of her new wall motion abnormality to determine whether this represented scar or infiltrative process or had normalized.  Unfortunately, she was intolerant due to anxiety.       ALLERGIES:  Multiple narcotics.     Outpatient Encounter Prescriptions as of 4/14/2017   Medication Sig Dispense Refill     triamcinolone (KENALOG) 0.1 % cream Apply topically 2 times daily       biotin (BIOTIN 5000) 5 MG CAPS        [DISCONTINUED] potassium chloride SA  (K-DUR/KLOR-CON M) 10 MEQ CR tablet Take 1 tablet (10 mEq) by mouth daily as needed for potassium supplementation (when take Lasix) 60 tablet 11     [DISCONTINUED] furosemide (LASIX) 20 MG tablet Take 0.5 tablets (10 mg) by mouth daily as needed For weight gain and dyspnea 30 tablet 3     [DISCONTINUED] metoprolol (LOPRESSOR) 25 MG tablet Take 1 tablet (25 mg) by mouth 2 times daily 60 tablet 0     ranitidine (ZANTAC) 150 MG capsule Take by mouth 2 times daily       pravastatin (PRAVACHOL) 20 MG tablet Take 20 mg by mouth At Bedtime   1     glipiZIDE (GLUCOTROL XL) 2.5 MG 24 hr tablet Take 2.5 mg by mouth daily  1     WARFARIN SODIUM PO Take 2.5 mg by mouth Take on Mon, Tues, Wed, Thurs, Fri, Sat (6 days a week)       WARFARIN SODIUM PO Take 5 mg by mouth on Sundays       Ibuprofen (ADVIL PO) Take 400 mg by mouth every 8 hours as needed for moderate pain       LORazepam (ATIVAN) 0.5 MG tablet Take 1 tablet (0.5 mg) by mouth every 4 hours as needed for anxiety or other (chest pain) 12 tablet 0     OXYBUTYNIN CHLORIDE PO Take 5 mg by mouth 2 times daily Clarified as immediate release with [facility]./Patient       Facility-Administered Encounter Medications as of 4/14/2017   Medication Dose Route Frequency Provider Last Rate Last Dose     nitroglycerin 100 MCG/ML injection              [DISCONTINUED] verapamil (ISOPTIN) 2.5 MG/ML injection              [DISCONTINUED] heparin (porcine) 1000 UNIT/ML injection              [DISCONTINUED] fentaNYL Citrate (PF) (SUBLIMAZE) 100 MCG/2ML injection               ASSESSMENT AND PLAN:  A pleasant, 76-year-old lady with diastolic dysfunction, chronic AFib with apparent rate control, morbid obesity, fibromyalgia, sleep apnea on CPAP and type 2 diabetes as well as dyslipidemia.  She had a recent apparent myocardial infarction without evidence of obstructive coronary disease.      During her hospitalization, she was subtherapeutic from the standpoint of her anticoagulation, and  conceivably an infarct could have occurred from thromboembolism on the basis of her atrial fib.  Certainly, she could have embolization of vegetation, for instance.  She states she has had recurrent urinary tract infections and a week and a half ago had fevers and malaise and awakening with night sweats.  She did have blood cultures in the hospital in December, which were negative, but clearly predated these fevers and night sweats.      A vasospasm could also potentially cause an infarct.  My hope is that this was a form of Takotsubo cardiomyopathy and that we will find normalization on a subsequent echo.  I will go ahead and order the echo, as we cannot order the MRI, and make further recommendations pending the results.  Given the potential for embolic phenomena, we have discussed transesophageal echocardiography, including the risk of esophageal perforation as well as respiratory decompensation with her severe sleep apnea.  Ms. Rehman describes a very substantial gag reflex and states that she cannot eat M&Ms because when they hit the back of her throat, she can vomit.  She is also allergic to multiple narcotics and is reticent to use fentanyl.  She has chronic pain from her back pain and fibromyalgia, but is not on chronic narcotics with the allergies as outlined above.        She also had a hiatal hernia repair and partial fundoplication with Dr. Ansari several years ago.  She has not had any difficulty swallowing since then, and her preoperative EGD showed no esophageal pathology, just the hiatal hernia.      I have ordered pulmonary function tests, as she has not had any in the setting of her exertional dyspnea, which she described as unchanged.  We will repeat the echocardiogram as mentioned to reassess wall motion abnormalities and consider a MAYRA if the abnormality is persistent.  I have considered a nuclear imaging test as well, but there is a substantial potential for breast attenuation artifact.       We have discussed resuming the Lasix, as she has only taken 2 tablets since her last visit; however, she is very limited by her bladder difficulties.  She will continue taking it as needed, but she agrees to restrict her sodium intake to less than 2 g a day, reading labels.      From the standpoint of her fevers and chills, we will check a CBC with differential as well as blood cultures with request to hold them for HACEK organisms.  We may end up proceeding to transesophageal echo as outlined above.      Further recommendations will be pending the results.  It is a pleasure being involved in her care.      Total time is 30 minutes, 25 in coordination of care and counseling.     Sincerely,    Dottie Geller MD     [facility]

## 2017-04-14 NOTE — MR AVS SNAPSHOT
After Visit Summary   4/14/2017    Savanna Rehman    MRN: [id number]           Patient Information     Date Of Birth          1940        Visit Information        Provider Department      4/14/2017 8:15 AM Dottie Geller MD [facility] [place]        Today's Diagnoses     Dyspnea on exertion    -  1    Cardiomyopathy, unspecified (H)        Acute on chronic diastolic (congestive) heart failure (H)        Fever and chills           Follow-ups after your visit        Your next 10 appointments already scheduled     Apr 25, 2017  1:00 PM CDT   Ech Complete with [id number] ([facility])    [address] [facility] [address]  [facility] [id number] [phone]           1.  Please bring or wear a comfortable two-piece outfit. 2.  You may eat, drink and take your normal medicines. 3.  For any questions that cannot be answered, please contact the ordering physician ***Please check-in at the [place] Registration Office located in [address] in the [facility] building. When you are finished registering, please go to [address] and have a seat. The technician will call your name for the test.            May 04, 2017  9:15 AM CDT   Return Visit with Dottie Geller MD   [facility] [place] ([facility])    [address] [place], [place] [address]  [facility] [id number] [phone]              Future tests that were ordered for you today     Open Future Orders        Priority Expected Expires Ordered    General PFT Lab (Please always keep checked) Routine 4/14/2017 4/14/2018 4/14/2017    Pulmonary Function Test Routine 4/14/2017 4/14/2018 4/14/2017    Echocardiogram Complete Routine 4/14/2017 4/14/2018 4/14/2017            Who to contact     If you have questions or need follow up information about today's clinic visit or your schedule please contact  "[facility] [place] directly at [phone].  Normal or non-critical lab and imaging results will be communicated to you by MyChart, letter or phone within 4 business days after the clinic has received the results. If you do not hear from us within 7 days, please contact the clinic through Ludeihart or phone. If you have a critical or abnormal lab result, we will notify you by phone as soon as possible.  Submit refill requests through Mapp or call your pharmacy and they will forward the refill request to us. Please allow 3 business days for your refill to be completed.          Additional Information About Your Visit        LudeiharHapara Information     Mapp lets you send messages to your doctor, view your test results, renew your prescriptions, schedule appointments and more. To sign up, go to [email or web address] . Click on \"Log in\" on the left side of the screen, which will take you to the Welcome page. Then click on \"Sign up Now\" on the right side of the page.     You will be asked to enter the access code listed below, as well as some personal information. Please follow the directions to create your username and password.     Your access code is: [email or web address]  Expires: 2017 10:19 AM     Your access code will  in 90 days. If you need help or a new code, please call your [facility] clinic or [phone].        Care EveryWhere ID     This is your Care EveryWhere ID. This could be used by other organizations to access your [facility] medical records  [id number]        Your Vitals Were     Pulse Height BMI (Body Mass Index)             68 1.727 m (5' 8\") 44.09 kg/m2          Blood Pressure from Last 3 Encounters:   17 130/70   17 122/72   17 139/53    Weight from Last 3 Encounters:   17 131.5 kg (290 lb)   17 131.1 kg (289 lb)   16 132.8 kg (292 lb 11.2 oz)              We Performed the Following     Follow-Up with Cardiologist        " Primary Care Provider Office Phone # Fax #    Hilary Finney [phone]       [facility] [address]  [facility] [address]        Thank you!     Thank you for choosing [facility] [place]  for your care. Our goal is always to provide you with excellent care. Hearing back from our patients is one way we can continue to improve our services. Please take a few minutes to complete the written survey that you may receive in the mail after your visit with us. Thank you!             Your Updated Medication List - Protect others around you: Learn how to safely use, store and throw away your medicines at www.disposemymeds.org.          This list is accurate as of: 4/14/17 10:19 AM.  Always use your most recent med list.                   Brand Name Dispense Instructions for use    ADVIL PO      Take 400 mg by mouth every 8 hours as needed for moderate pain       BIOTIN 5000 5 MG Caps   Generic drug:  biotin          furosemide 20 MG tablet    LASIX    30 tablet    Take 0.5 tablets (10 mg) by mouth daily as needed For weight gain and dyspnea       glipiZIDE 2.5 MG 24 hr tablet    GLUCOTROL XL     Take 2.5 mg by mouth daily       LORazepam 0.5 MG tablet    ATIVAN    12 tablet    Take 1 tablet (0.5 mg) by mouth every 4 hours as needed for anxiety or other (chest pain)       metoprolol 25 MG tablet    LOPRESSOR    60 tablet    Take 1 tablet (25 mg) by mouth 2 times daily       OXYBUTYNIN CHLORIDE PO      Take 5 mg by mouth 2 times daily Clarified as immediate release with [facility]./Patient       potassium chloride SA 10 MEQ CR tablet    K-DUR/KLOR-CON M    60 tablet    Take 1 tablet (10 mEq) by mouth daily as needed for potassium supplementation (when take Lasix)       pravastatin 20 MG tablet    PRAVACHOL     Take 20 mg by mouth At Bedtime       ranitidine 150 MG capsule    ZANTAC     Take by mouth 2 times daily       triamcinolone 0.1 % cream    KENALOG      Apply topically 2 times daily       * WARFARIN SODIUM PO      Take 2.5 mg by mouth Take on Mon, Tues, Wed, Thurs, Fri, Sat (6 days a week)       * WARFARIN SODIUM PO      Take 5 mg by mouth on Sundays       * Notice:  This list has 2 medication(s) that are the same as other medications prescribed for you. Read the directions carefully, and ask your doctor or other care provider to review them with you.

## 2017-04-14 NOTE — PROGRESS NOTES
HPI and Plan:   See dictation    Orders Placed This Encounter   Procedures     CBC with platelets differential     Echocardiogram Complete     General PFT Lab (Please always keep checked)     Pulmonary Function Test       No orders of the defined types were placed in this encounter.      Medications Discontinued During This Encounter   Medication Reason     verapamil (ISOPTIN) 2.5 MG/ML injection      heparin (porcine) 1000 UNIT/ML injection      fentaNYL Citrate (PF) (SUBLIMAZE) 100 MCG/2ML injection          Encounter Diagnoses   Name Primary?     Cardiomyopathy, unspecified (H)      Acute on chronic diastolic (congestive) heart failure (H)      Dyspnea on exertion Yes     Fever and chills        CURRENT MEDICATIONS:  Current Outpatient Prescriptions   Medication Sig Dispense Refill     triamcinolone (KENALOG) 0.1 % cream Apply topically 2 times daily       biotin (BIOTIN 5000) 5 MG CAPS        potassium chloride SA (K-DUR/KLOR-CON M) 10 MEQ CR tablet Take 1 tablet (10 mEq) by mouth daily as needed for potassium supplementation (when take Lasix) 60 tablet 11     furosemide (LASIX) 20 MG tablet Take 0.5 tablets (10 mg) by mouth daily as needed For weight gain and dyspnea 30 tablet 3     metoprolol (LOPRESSOR) 25 MG tablet Take 1 tablet (25 mg) by mouth 2 times daily 60 tablet 0     ranitidine (ZANTAC) 150 MG capsule Take by mouth 2 times daily       pravastatin (PRAVACHOL) 20 MG tablet Take 20 mg by mouth At Bedtime   1     glipiZIDE (GLUCOTROL XL) 2.5 MG 24 hr tablet Take 2.5 mg by mouth daily  1     WARFARIN SODIUM PO Take 2.5 mg by mouth Take on Mon, Tues, Wed, Thurs, Fri, Sat (6 days a week)       WARFARIN SODIUM PO Take 5 mg by mouth on Sundays       Ibuprofen (ADVIL PO) Take 400 mg by mouth every 8 hours as needed for moderate pain       LORazepam (ATIVAN) 0.5 MG tablet Take 1 tablet (0.5 mg) by mouth every 4 hours as needed for anxiety or other (chest pain) 12 tablet 0     OXYBUTYNIN CHLORIDE PO Take 5 mg by  mouth 2 times daily Clarified as immediate release with [facility]./Patient         ALLERGIES     Allergies   Allergen Reactions     Petroleum Jelly [Petrolatum] Anaphylaxis     Rash and swelling     Shellfish-Derived Products Anaphylaxis     Tongue swelling     Aspirin Swelling     tiongue swelling     Bacitracin      Rash swelling     Coumadin [Warfarin] Swelling     Leg swelling     Darvon [Propoxyphene] Swelling     Throat closes     Dilaudid [Hydromorphone]      Levaquin [Levofloxacin] Swelling     Tongue swelling     Neosporin [Neomycin-Polymyx-Gramicid] Swelling     rash     Oxycodone      Severe itching     Percodan [Oxycodone-Aspirin]      Severe itching     Tramadol      Vicodin [Hydrocodone-Acetaminophen]      Severe itching       Xarelto [Rivaroxaban]      Adhesive Tape Rash     Band aids      Codeine Rash       PAST MEDICAL HISTORY:  Past Medical History:   Diagnosis Date     Anemia     Iron Deficiency anemia     Arrhythmia     Afib     Atrial fibrillation (H)      CAD (coronary artery disease)     non-obstructive     Congestive heart failure (H)      Degenerative disk disease      Fibromyalgia      Gastro-oesophageal reflux disease      Hiatal hernia      History of blood transfusion      Neuropathy (H)      Other chronic pain     neck, low back, legs     Pernicious anemia      Sleep apnea     uses CPAP.     Urinary incontinence      Vitamin D deficiency        PAST SURGICAL HISTORY:  Past Surgical History:   Procedure Laterality Date     APPENDECTOMY       C RAD RESEC TONSIL/PILLARS       CHOLECYSTECTOMY       COLONOSCOPY  3/15/2011     CORONARY ANGIOGRAPHY ADULT ORDER       HEART CATH LEFT HEART CATH  12/30/16    medication management     HYSTERECTOMY TOTAL ABDOMINAL       KNEE SURGERY Left      LAPAROSCOPIC NISSEN FUNDOPLICATION N/A 2/4/2015    Procedure: LAPAROSCOPIC NISSEN FUNDOPLICATION;  Surgeon: Armando Ansari MD;  Location:  OR     ORTHOPEDIC SURGERY      joint replacement     ORTHOPEDIC  "SURGERY Left     knee replacement     ORTHOPEDIC SURGERY      ulnar transposition     SOFT TISSUE SURGERY Left     lt elbow mass, ulnar transposition       FAMILY HISTORY:  Family History   Problem Relation Age of Onset     Unknown/Adopted No family hx of        SOCIAL HISTORY:  Social History     Social History     Marital status:      Spouse name: N/A     Number of children: N/A     Years of education: N/A     Social History Main Topics     Smoking status: Former Smoker     Types: Cigarettes     Start date: 9/1/2014     Smokeless tobacco: None     Alcohol use Yes      Comment: socially.     Drug use: None     Sexual activity: Not Asked     Other Topics Concern     None     Social History Narrative       Review of Systems:  Skin:  Positive for   dry, extra dryness on elbows    Eyes:  Positive for glasses    ENT:  Negative      Respiratory:  Positive for dyspnea on exertion;cough;sleep apnea dry cough, does not use machine    Cardiovascular:    dizziness;Positive for;edema    Gastroenterology: Negative      Genitourinary:  Positive for urinary frequency lasix  Musculoskeletal:  Positive for back pain;arthritis;joint pain    Neurologic:  Positive for numbness or tingling of feet    Psychiatric:  Positive for excessive stress    Heme/Lymph/Imm:  Positive for easy bruising    Endocrine:  Positive for diabetes      Physical Exam:  Vitals: /70  Pulse 68  Ht 1.727 m (5' 8\")  Wt 131.5 kg (290 lb)  BMI 44.09 kg/m2    Constitutional:  cooperative, alert and oriented, well developed, well nourished, in no acute distress        Skin:  warm and dry to the touch, no apparent skin lesions or masses noted        Head:  normocephalic, no masses or lesions        Eyes:  pupils equal and round, conjunctivae and lids unremarkable, sclera white, no xanthalasma, EOMS intact, no nystagmus        ENT:  no pallor or cyanosis        Neck:  carotid pulses are full and equal bilaterally   JVP not well visualized    Chest:  " normal breath sounds, clear to auscultation, normal A-P diameter, normal symmetry, normal respiratory excursion, no use of accessory muscles          Cardiac: no murmurs, gallops or rubs detected irregularly irregular rhythm distant heart sounds              Abdomen:  abdomen soft;BS normoactive obese      Vascular: pulses full and equal                                        Extremities and Back:  no deformities, clubbing, cyanosis, erythema observed   bilateral LE edema;pitting;1+          Neurological:  no gross motor deficits;affect appropriate, oriented to time, person and place              CC  Dottie Geller MD   [facility]  [address]  [place] [address]

## 2017-04-17 ENCOUNTER — TELEPHONE (OUTPATIENT)
Dept: CARDIOLOGY | Facility: CLINIC | Age: 77
End: 2017-04-17

## 2017-04-17 NOTE — TELEPHONE ENCOUNTER
"Called pt. With results of recent CBC and blood cultures.  CBC WNL and blood cultures negative thus far.  Pt states understanding.  Pt. Questions if she should keep her appointment to have an echocardiogram completed next week, she states she had one completed in 12/16 and concerned if medicare will cover another one.  Discussed the reason for echo per Dr. Geller' OV note and asked that pt. Contact her insurance company for clarification prior to test. Per Dr. GAMEZ's OV note, \"A vasospasm could also potentially cause an infarct. My hope is that this was a form of Takotsubo cardiomyopathy and that we will find normalization on a subsequent echo.\"  Pt. States understanding and agrees.    "

## 2017-04-20 ENCOUNTER — HOSPITAL ENCOUNTER (OUTPATIENT)
Dept: RESPIRATORY THERAPY | Facility: CLINIC | Age: 77
Discharge: HOME | End: 2017-04-20
Attending: INTERNAL MEDICINE | Admitting: INTERNAL MEDICINE
Payer: MEDICARE

## 2017-04-20 DIAGNOSIS — R06.09 DYSPNEA ON EXERTION: ICD-10-CM

## 2017-04-20 DIAGNOSIS — I50.33 ACUTE ON CHRONIC DIASTOLIC (CONGESTIVE) HEART FAILURE (H): ICD-10-CM

## 2017-04-20 DIAGNOSIS — I42.9 CARDIOMYOPATHY, UNSPECIFIED (H): ICD-10-CM

## 2017-04-20 LAB
DLCOCOR-%PRED-PRE: 80 %
DLCOCOR-PRE: 17.72 ML/MIN/MMHG
DLCOUNC-%PRED-PRE: 79 %
DLCOUNC-PRE: 17.5 ML/MIN/MMHG
DLCOUNC-PRED: 22.06 ML/MIN/MMHG
ERV-%PRED-PRE: 76 %
ERV-PRE: 0.17 L
ERV-PRED: 0.22 L
EXPTIME-PRE: 6.92 SEC
FEF2575-%PRED-POST: 98 %
FEF2575-%PRED-PRE: 61 %
FEF2575-POST: 1.88 L/SEC
FEF2575-PRE: 1.17 L/SEC
FEF2575-PRED: 1.91 L/SEC
FEFMAX-%PRED-PRE: 91 %
FEFMAX-PRE: 5.58 L/SEC
FEFMAX-PRED: 6.09 L/SEC
FEV1-%PRED-PRE: 70 %
FEV1-PRE: 1.73 L
FEV1FEV6-PRE: 73 %
FEV1FEV6-PRED: 78 %
FEV1FVC-PRE: 73 %
FEV1FVC-PRED: 77 %
FEV1SVC-PRE: 70 %
FEV1SVC-PRED: 68 %
FIFMAX-PRE: 4.17 L/SEC
FVC-%PRED-PRE: 73 %
FVC-PRE: 2.38 L
FVC-PRED: 3.21 L
IC-%PRED-PRE: 67 %
IC-PRE: 2.29 L
IC-PRED: 3.38 L
VA-%PRED-PRE: 74 %
VA-PRE: 4.36 L
VC-%PRED-PRE: 68 %
VC-PRE: 2.46 L
VC-PRED: 3.6 L

## 2017-04-20 PROCEDURE — 94729 DIFFUSING CAPACITY: CPT

## 2017-04-20 PROCEDURE — 94010 BREATHING CAPACITY TEST: CPT

## 2017-04-20 PROCEDURE — 25000125 ZZHC RX 250

## 2017-04-20 PROCEDURE — 94060 EVALUATION OF WHEEZING: CPT

## 2017-04-20 RX ORDER — ALBUTEROL SULFATE 0.83 MG/ML
SOLUTION RESPIRATORY (INHALATION)
Status: COMPLETED
Start: 2017-04-20 | End: 2017-04-20

## 2017-04-20 RX ADMIN — ALBUTEROL SULFATE: 2.5 SOLUTION RESPIRATORY (INHALATION) at 10:44

## 2017-04-20 NOTE — PROGRESS NOTES
PFT NOTE: Patient completed pulmonary function testing with pre/post spirometry and diffusion. Patient unable to perform Lung Volumes due to being claustrophobic. Albuterol neb 2.5 mg given for bronchodilation. Good patient effort and cooperation. The results of this test met the ATS standards for acceptability and repeatability.

## 2017-04-28 NOTE — PROCEDURES
Please see medical chart for graphs and statistics related to this report.       REFERRING PHYSICIAN:   Dottie Geller                                  TECHNICIAN:   Alyssa Davies   DIAGNOSIS:   Atrial Fibrillation, CRISTIANA, Cardiomyopathy   HEIGHT:   69.00 inches                             WEIGHT:   290.00 Lbs      DYSPNEA:  After any exertion        COUGH:  Non-productive        WHEEZE: No wheeze      TOBACCO PROD:   Cigarette   YEARS SMOKED:   60.0   PKS/DAY:   0.8   YEARS QUIT:    4.0                                                              MEDICATIONS:           POST-TEST COMMENTS:   Good patient effort and cooperation.  The results of these test meet ATS criteria for acceptability and repeatability.  Patient was given 2.5mg Albuterol neb as bronchodilator.  Patient unable to perform Lung Volume due to being claustrophobic in plethysmograph box.  Result of Hgb 13.0 used from 17 for DLCO.         INTERPRETATION:      1.  Mild obstruction without significant bronchodilator response   2.  Diffusion capacity is normal when corrected for lung volumes         ANA OLMSTEAD MD             D: 2017 08:08   T: 2017 08:20   MT: SUKI      Name:     KRIS SANDERSON   MRN:      [id number]        Account:        [id number]   :      1940           Procedure Date: 2017      Document: [id number]       cc: Dottie Geller MD

## 2017-04-30 LAB
BACTERIA SPEC CULT: NORMAL
Lab: NORMAL
MICRO REPORT STATUS: NORMAL
SPECIMEN SOURCE: NORMAL

## 2017-05-02 ENCOUNTER — HOSPITAL ENCOUNTER (OUTPATIENT)
Dept: CARDIOLOGY | Facility: CLINIC | Age: 77
Discharge: HOME | End: 2017-05-02
Attending: INTERNAL MEDICINE | Admitting: INTERNAL MEDICINE
Payer: MEDICARE

## 2017-05-02 DIAGNOSIS — I42.9 CARDIOMYOPATHY, UNSPECIFIED (H): ICD-10-CM

## 2017-05-02 DIAGNOSIS — I50.33 ACUTE ON CHRONIC DIASTOLIC (CONGESTIVE) HEART FAILURE (H): ICD-10-CM

## 2017-05-02 PROCEDURE — 93306 TTE W/DOPPLER COMPLETE: CPT | Mod: 26 | Performed by: INTERNAL MEDICINE

## 2017-05-02 PROCEDURE — 93306 TTE W/DOPPLER COMPLETE: CPT

## 2017-05-04 ENCOUNTER — OFFICE VISIT (OUTPATIENT)
Dept: CARDIOLOGY | Facility: CLINIC | Age: 77
End: 2017-05-04
Payer: MEDICARE

## 2017-05-04 VITALS
DIASTOLIC BLOOD PRESSURE: 88 MMHG | SYSTOLIC BLOOD PRESSURE: 130 MMHG | WEIGHT: 289 LBS | HEART RATE: 54 BPM | HEIGHT: 68 IN | BODY MASS INDEX: 43.8 KG/M2

## 2017-05-04 DIAGNOSIS — I50.33 ACUTE ON CHRONIC DIASTOLIC (CONGESTIVE) HEART FAILURE (H): ICD-10-CM

## 2017-05-04 PROCEDURE — 99214 OFFICE O/P EST MOD 30 MIN: CPT | Performed by: INTERNAL MEDICINE

## 2017-05-04 RX ORDER — LOSARTAN POTASSIUM 25 MG/1
25 TABLET ORAL DAILY
Qty: 30 TABLET | Refills: 11 | Status: SHIPPED | OUTPATIENT
Start: 2017-05-04 | End: 2017-06-09

## 2017-05-04 RX ORDER — METOPROLOL TARTRATE 25 MG/1
12.5 TABLET, FILM COATED ORAL 2 TIMES DAILY
Qty: 60 TABLET | Refills: 3 | Status: SHIPPED | OUTPATIENT
Start: 2017-05-04 | End: 2017-05-04

## 2017-05-04 RX ORDER — HYDROCHLOROTHIAZIDE 12.5 MG/1
12.5 CAPSULE ORAL DAILY
Qty: 30 CAPSULE | Refills: 11 | Status: SHIPPED | OUTPATIENT
Start: 2017-05-04 | End: 2017-08-23

## 2017-05-04 RX ORDER — METOPROLOL SUCCINATE 25 MG/1
12.5 TABLET, EXTENDED RELEASE ORAL DAILY
Qty: 30 TABLET | Refills: 11 | Status: SHIPPED | OUTPATIENT
Start: 2017-05-04 | End: 2017-08-23

## 2017-05-04 NOTE — LETTER
5/4/2017    Hilary Finney MD  [facility]   [address]  [facility] [address]    RE: Savanna Rehman       Dear Colleague,    I had the pleasure of seeing Savanna Rehman in the [facility].    REASON FOR VISIT:  Followup.      Ms. Rehman is a pleasant 76-year-old lady who comes in routine followup.  She has chronic atrial fibrillation and was hospitalized last year with a subtherapeutic INR and was short of breath and hypoxemic with elevated troponins up to 1.  She had wall motion abnormalities in the anterior wall but cardiac catheterization showed no evidence of obstructive disease.  We had discussed consideration of cardiac MRI, however, the patient cannot tolerate it.  We followed up an echocardiogram to follow up wall motion abnormality with the hope that this represented only stress-induced cardiomyopathy and her echo today in fact is reassuringly normal with resolution of the wall motion abnormalities as hoped for.      Mr. Rehman states she feels well and has no complaints of chest pain, shortness of breath, lightheadedness, presyncope, syncope, PND, orthopnea, palpitations or pedal edema.  She does state in general she has not felt very good for the last 3 weeks with diarrhea and her diabetic meds were changed to glipizide; otherwise, she has no complaints.  She does have Lasix prescribed 10 mg p.r.n. but she states she has not taken any.  She does appear to have 1+ pitting edema on exam today.     Outpatient Encounter Prescriptions as of 5/4/2017   Medication Sig Dispense Refill     losartan (COZAAR) 25 MG tablet Take 1 tablet (25 mg) by mouth daily 30 tablet 11     metoprolol (TOPROL-XL) 25 MG 24 hr tablet Take 0.5 tablets (12.5 mg) by mouth daily At bedtime 30 tablet 11     hydrochlorothiazide (MICROZIDE) 12.5 MG capsule Take 1 capsule (12.5 mg) by mouth daily 30 capsule 11     triamcinolone (KENALOG) 0.1 % cream Apply topically 2 times daily        biotin (BIOTIN 5000) 5 MG CAPS        ranitidine (ZANTAC) 150 MG capsule Take by mouth 2 times daily       pravastatin (PRAVACHOL) 20 MG tablet Take 20 mg by mouth At Bedtime   1     glipiZIDE (GLUCOTROL XL) 2.5 MG 24 hr tablet Take 2.5 mg by mouth daily  1     WARFARIN SODIUM PO Take 2.5 mg by mouth Take on Mon, Tues, Wed, Thurs, Fri, Sat (6 days a week)       WARFARIN SODIUM PO Take 5 mg by mouth on Sundays       Ibuprofen (ADVIL PO) Take 400 mg by mouth every 8 hours as needed for moderate pain       LORazepam (ATIVAN) 0.5 MG tablet Take 1 tablet (0.5 mg) by mouth every 4 hours as needed for anxiety or other (chest pain) 12 tablet 0     OXYBUTYNIN CHLORIDE PO Take 5 mg by mouth 2 times daily Clarified as immediate release with [facility]./Patient       [DISCONTINUED] metoprolol (LOPRESSOR) 25 MG tablet Take 0.5 tablets (12.5 mg) by mouth 2 times daily 60 tablet 3     [DISCONTINUED] potassium chloride SA (K-DUR/KLOR-CON M) 10 MEQ CR tablet Take 1 tablet (10 mEq) by mouth daily as needed for potassium supplementation (when take Lasix) 60 tablet 11     [DISCONTINUED] furosemide (LASIX) 20 MG tablet Take 0.5 tablets (10 mg) by mouth daily as needed For weight gain and dyspnea 30 tablet 3     [DISCONTINUED] metoprolol (LOPRESSOR) 25 MG tablet Take 1 tablet (25 mg) by mouth 2 times daily 60 tablet 0     Facility-Administered Encounter Medications as of 5/4/2017   Medication Dose Route Frequency Provider Last Rate Last Dose     nitroglycerin 100 MCG/ML injection               ASSESSMENT AND PLAN:  A pleasant 76-year-old lady with morbid obesity and atrial fibrillation on anticoagulation with adequate rate control.  She had an event last year, while subtherapeutic potentially consistent with an atypical Takotsubo cardiomyopathy.  Her wall motion abnormalities have all resolved.        Ms. Rehman does express concern for some fatigue and we can change her metoprolol to Toprol-XL 12.5 mg at bedtime.  I would, however,  start losartan 25 mg a day as well and hydrochlorothiazide 12.5 mg as replacement and for optimal blood pressure control.  My feeling would be that I would like to continue afterload reduction with medications that can assist with remodeling for 6 months after normalization of function.  We will have her back in about a week to 10 days' time to recheck labs and blood pressure.  I have suggested that she take 20 mg of Lasix today with 10 mEq of potassium, but then switch over to the hydrochlorothiazide thereafter.  She has verbalized understanding and agrees.      Total time is 30 minutes, 25 in coordination of care and counseling.           Again, thank you for allowing me to participate in the care of your patient.      Sincerely,    Dottie Geller MD     [facility]

## 2017-05-04 NOTE — MR AVS SNAPSHOT
After Visit Summary   5/4/2017    Savanna Rehman    MRN: [id number]           Patient Information     Date Of Birth          1940        Visit Information        Provider Department      5/4/2017 9:15 AM Dottie Geller MD [facility]        Today's Diagnoses     Acute on chronic diastolic (congestive) heart failure (H)           Follow-ups after your visit        Additional Services     Follow-Up with Cardiac Advanced Practice Provider                 Your next 10 appointments already scheduled     May 10, 2017 11:00 AM CDT   LAB with RU LAB   [facility] ([facility])    [id number] [place] [address]  [facility] [id number] [phone]           Patient must bring picture ID.  Patient should be prepared to give a urine specimen  Please do not eat 10-12 hours before your appointment if you are coming in fasting for labs on lipids, cholesterol, or glucose (sugar).  Pregnant women should follow their Care Team instructions. Water with medications is okay. Do not drink coffee or other fluids.   If you have concerns about taking  your medications, please ask at office or if scheduling via Postcron, send a message by clicking on Secure Messaging, Message Your Care Team.            May 16, 2017  1:10 PM CDT   Return Visit with Maral Lora PA-C   [facility] ([facility]    [id number] [place] [address]  [facility] [id number] [phone]              Future tests that were ordered for you today     Open Future Orders        Priority Expected Expires Ordered    Comprehensive metabolic panel Routine 5/11/2017 5/25/2017 5/4/2017    Follow-Up with Cardiac Advanced Practice Provider Routine 5/11/2017 5/4/2019 5/4/2017            Who to contact     If you have questions or need follow up information about today's clinic visit or your schedule please  "contact [facility] [place] directly at [phone].  Normal or non-critical lab and imaging results will be communicated to you by MyChart, letter or phone within 4 business days after the clinic has received the results. If you do not hear from us within 7 days, please contact the clinic through MyChart or phone. If you have a critical or abnormal lab result, we will notify you by phone as soon as possible.  Submit refill requests through Cegal or call your pharmacy and they will forward the refill request to us. Please allow 3 business days for your refill to be completed.          Additional Information About Your Visit        [employer] Information     Cegal lets you send messages to your doctor, view your test results, renew your prescriptions, schedule appointments and more. To sign up, go to [email or web address] . Click on \"Log in\" on the left side of the screen, which will take you to the Welcome page. Then click on \"Sign up Now\" on the right side of the page.     You will be asked to enter the access code listed below, as well as some personal information. Please follow the directions to create your username and password.     Your access code is: [email or web address]  Expires: 2017 10:19 AM     Your access code will  in 90 days. If you need help or a new code, please call your [facility] clinic or [phone].        Care EveryWhere ID     This is your Care EveryWhere ID. This could be used by other organizations to access your [facility] medical records  [id number]        Your Vitals Were     Pulse Height BMI (Body Mass Index)             54 1.727 m (5' 8\") 43.94 kg/m2          Blood Pressure from Last 3 Encounters:   17 130/88   17 130/70   17 122/72    Weight from Last 3 Encounters:   17 131.1 kg (289 lb)   17 131.5 kg (290 lb)   17 131.1 kg (289 lb)                 Today's Medication Changes          These changes are accurate " as of: 5/4/17 11:59 PM.  If you have any questions, ask your nurse or doctor.               Start taking these medicines.        Dose/Directions    hydrochlorothiazide 12.5 MG capsule   Commonly known as:  MICROZIDE   Used for:  Acute on chronic diastolic (congestive) heart failure (H)   Started by:  Dottie Geller MD        Dose:  12.5 mg   Take 1 capsule (12.5 mg) by mouth daily   Quantity:  30 capsule   Refills:  11       losartan 25 MG tablet   Commonly known as:  COZAAR   Used for:  Acute on chronic diastolic (congestive) heart failure (H)   Started by:  Dottie Geller MD        Dose:  25 mg   Take 1 tablet (25 mg) by mouth daily   Quantity:  30 tablet   Refills:  11       metoprolol 25 MG 24 hr tablet   Commonly known as:  TOPROL-XL   Used for:  Acute on chronic diastolic (congestive) heart failure (H)   Started by:  Dottie Geller MD        Dose:  12.5 mg   Take 0.5 tablets (12.5 mg) by mouth daily At bedtime   Quantity:  30 tablet   Refills:  11         Stop taking these medicines if you haven't already. Please contact your care team if you have questions.     furosemide 20 MG tablet   Commonly known as:  LASIX   Stopped by:  Dottie Geller MD           metoprolol 25 MG tablet   Commonly known as:  LOPRESSOR   Stopped by:  Dottie Geller MD           potassium chloride SA 10 MEQ CR tablet   Commonly known as:  K-DUR/KLOR-CON M   Stopped by:  Dottie Geller MD                Where to get your medicines      These medications were sent to [facility] [address] [place] [address]  AT [address] 42 [facility] [address]     Phone:  [phone]     hydrochlorothiazide 12.5 MG capsule    losartan 25 MG tablet    metoprolol 25 MG 24 hr tablet                Primary Care Provider Office Phone # Fax #    Hilary Finney [phone]       [facility] [address] [facility]  [address]  [facility] [address]        Thank you!     Thank you for choosing [facility] [place]  for your care. Our goal is always to provide you with excellent care. Hearing back from our patients is one way we can continue to improve our services. Please take a few minutes to complete the written survey that you may receive in the mail after your visit with us. Thank you!             Your Updated Medication List - Protect others around you: Learn how to safely use, store and throw away your medicines at www.disposemymeds.org.          This list is accurate as of: 5/4/17 11:59 PM.  Always use your most recent med list.                   Brand Name Dispense Instructions for use    ADVIL PO      Take 400 mg by mouth every 8 hours as needed for moderate pain       BIOTIN 5000 5 MG Caps   Generic drug:  biotin          glipiZIDE 2.5 MG 24 hr tablet    GLUCOTROL XL     Take 2.5 mg by mouth daily       hydrochlorothiazide 12.5 MG capsule    MICROZIDE    30 capsule    Take 1 capsule (12.5 mg) by mouth daily       LORazepam 0.5 MG tablet    ATIVAN    12 tablet    Take 1 tablet (0.5 mg) by mouth every 4 hours as needed for anxiety or other (chest pain)       losartan 25 MG tablet    COZAAR    30 tablet    Take 1 tablet (25 mg) by mouth daily       metoprolol 25 MG 24 hr tablet    TOPROL-XL    30 tablet    Take 0.5 tablets (12.5 mg) by mouth daily At bedtime       OXYBUTYNIN CHLORIDE PO      Take 5 mg by mouth 2 times daily Clarified as immediate release with [facility]./Patient       pravastatin 20 MG tablet    PRAVACHOL     Take 20 mg by mouth At Bedtime       ranitidine 150 MG capsule    ZANTAC     Take by mouth 2 times daily       triamcinolone 0.1 % cream    KENALOG     Apply topically 2 times daily       * WARFARIN SODIUM PO      Take 2.5 mg by mouth Take on Mon, Tues, Wed, Thurs, Fri, Sat (6 days a week)       * WARFARIN SODIUM PO      Take 5 mg by mouth on Sundays       * Notice:  This  list has 2 medication(s) that are the same as other medications prescribed for you. Read the directions carefully, and ask your doctor or other care provider to review them with you.

## 2017-05-05 ENCOUNTER — TELEPHONE (OUTPATIENT)
Dept: CARDIOLOGY | Facility: CLINIC | Age: 77
End: 2017-05-05

## 2017-05-05 NOTE — PROGRESS NOTES
DIAGNOSES:      Encounter Diagnosis   Name Primary?     Acute on chronic diastolic (congestive) heart failure (H)          HPI:  See dictation        MEDICATIONS:    Current Outpatient Prescriptions   Medication Sig Dispense Refill     losartan (COZAAR) 25 MG tablet Take 1 tablet (25 mg) by mouth daily 30 tablet 11     metoprolol (TOPROL-XL) 25 MG 24 hr tablet Take 0.5 tablets (12.5 mg) by mouth daily At bedtime 30 tablet 11     hydrochlorothiazide (MICROZIDE) 12.5 MG capsule Take 1 capsule (12.5 mg) by mouth daily 30 capsule 11     triamcinolone (KENALOG) 0.1 % cream Apply topically 2 times daily       biotin (BIOTIN 5000) 5 MG CAPS        ranitidine (ZANTAC) 150 MG capsule Take by mouth 2 times daily       pravastatin (PRAVACHOL) 20 MG tablet Take 20 mg by mouth At Bedtime   1     glipiZIDE (GLUCOTROL XL) 2.5 MG 24 hr tablet Take 2.5 mg by mouth daily  1     WARFARIN SODIUM PO Take 2.5 mg by mouth Take on Mon, Tues, Wed, Thurs, Fri, Sat (6 days a week)       WARFARIN SODIUM PO Take 5 mg by mouth on Sundays       Ibuprofen (ADVIL PO) Take 400 mg by mouth every 8 hours as needed for moderate pain       LORazepam (ATIVAN) 0.5 MG tablet Take 1 tablet (0.5 mg) by mouth every 4 hours as needed for anxiety or other (chest pain) 12 tablet 0     OXYBUTYNIN CHLORIDE PO Take 5 mg by mouth 2 times daily Clarified as immediate release with [facility]./Patient           ALLERGIES     Allergies   Allergen Reactions     Petroleum Jelly [Petrolatum] Anaphylaxis     Rash and swelling     Shellfish-Derived Products Anaphylaxis     Tongue swelling     Aspirin Swelling     tiongue swelling     Bacitracin      Rash swelling     Coumadin [Warfarin] Swelling     Leg swelling     Darvon [Propoxyphene] Swelling     Throat closes     Dilaudid [Hydromorphone]      Levaquin [Levofloxacin] Swelling     Tongue swelling     Neosporin [Neomycin-Polymyx-Gramicid] Swelling     rash     Oxycodone      Severe itching     Percodan [Oxycodone-Aspirin]       Severe itching     Tramadol      Vicodin [Hydrocodone-Acetaminophen]      Severe itching       Xarelto [Rivaroxaban]      Adhesive Tape Rash     Band aids      Codeine Rash       PAST MEDICAL HISTORY:  Past Medical History:   Diagnosis Date     Anemia     Iron Deficiency anemia     Arrhythmia     Afib     Atrial fibrillation (H)      CAD (coronary artery disease)     non-obstructive     Congestive heart failure (H)      Degenerative disk disease      Fibromyalgia      Gastro-oesophageal reflux disease      Hiatal hernia      History of blood transfusion      Neuropathy (H)      Other chronic pain     neck, low back, legs     Pernicious anemia      Sleep apnea     uses CPAP.     Urinary incontinence      Vitamin D deficiency        PAST SURGICAL HISTORY:  Past Surgical History:   Procedure Laterality Date     APPENDECTOMY       C RAD RESEC TONSIL/PILLARS       CHOLECYSTECTOMY       COLONOSCOPY  3/15/2011     CORONARY ANGIOGRAPHY ADULT ORDER       HEART CATH LEFT HEART CATH  12/30/16    medication management     HYSTERECTOMY TOTAL ABDOMINAL       KNEE SURGERY Left      LAPAROSCOPIC NISSEN FUNDOPLICATION N/A 2/4/2015    Procedure: LAPAROSCOPIC NISSEN FUNDOPLICATION;  Surgeon: Armando Ansari MD;  Location:  OR     ORTHOPEDIC SURGERY      joint replacement     ORTHOPEDIC SURGERY Left     knee replacement     ORTHOPEDIC SURGERY      ulnar transposition     SOFT TISSUE SURGERY Left     lt elbow mass, ulnar transposition       FAMILY HISTORY:  Family History   Problem Relation Age of Onset     Unknown/Adopted No family hx of        SOCIAL HISTORY:  Social History     Social History     Marital status:      Spouse name: N/A     Number of children: N/A     Years of education: N/A     Social History Main Topics     Smoking status: Former Smoker     Types: Cigarettes     Start date: 9/1/2014     Smokeless tobacco: None     Alcohol use Yes      Comment: socially.     Drug use: None     Sexual activity: Not Asked  "    Other Topics Concern     None     Social History Narrative       Review of Systems:  Skin:  Positive for     Eyes:  Positive for glasses  ENT:  Negative    Respiratory:  Positive for dyspnea on exertion;cough;sleep apnea;shortness of breath  Cardiovascular:    dizziness;Positive for;edema;fatigue;lower extremity symptoms;heaviness  Gastroenterology: Negative    Genitourinary:  Positive for urinary frequency  Musculoskeletal:  Positive for back pain;arthritis;joint pain  Neurologic:  Positive for numbness or tingling of feet  Psychiatric:  Positive for excessive stress  Heme/Lymph/Imm:  Positive for easy bruising  Endocrine:  Positive for diabetes    Physical Exam:  Vitals: /88  Pulse 54  Ht 1.727 m (5' 8\")  Wt 131.1 kg (289 lb)  BMI 43.94 kg/m2    Constitutional:  cooperative, alert and oriented, well developed, well nourished, in no acute distress        Skin:  warm and dry to the touch, no apparent skin lesions or masses noted        Head:  normocephalic, no masses or lesions        Eyes:  pupils equal and round, conjunctivae and lids unremarkable, sclera white, no xanthalasma, EOMS intact, no nystagmus        ENT:  no pallor or cyanosis        Neck:  carotid pulses are full and equal bilaterally   JVP not well visualized    Chest:  normal breath sounds, clear to auscultation, normal A-P diameter, normal symmetry, normal respiratory excursion, no use of accessory muscles        Cardiac: no murmurs, gallops or rubs detected irregularly irregular rhythm distant heart sounds              Abdomen:  abdomen soft;BS normoactive obese      Vascular: pulses full and equal                                        Extremities and Back:  no deformities, clubbing, cyanosis, erythema observed   bilateral LE edema;pitting;1+          Neurological:  no gross motor deficits;affect appropriate, oriented to time, person and place          ASSESSMENT AND PLAN:    See dictation    ORDERS AT TODAY'S VISIT:    Orders Placed " This Encounter   Procedures     Comprehensive metabolic panel     Follow-Up with Cardiac Advanced Practice Provider           CC  No referring provider defined for this encounter.

## 2017-05-05 NOTE — PROGRESS NOTES
REASON FOR VISIT:  Followup.      HISTORY OF PRESENT ILLNESS:  Ms. Rehman is a pleasant 76-year-old lady who comes in routine followup.  She has chronic atrial fibrillation and was hospitalized last year with a subtherapeutic INR and was short of breath and hypoxemic with elevated troponins up to 1.  She had wall motion abnormalities in the anterior wall but cardiac catheterization showed no evidence of obstructive disease.  We had discussed consideration of cardiac MRI, however, the patient cannot tolerate it.  We followed up an echocardiogram to follow up wall motion abnormality with the hope that this represented only stress-induced cardiomyopathy and her echo today in fact is reassuringly normal with resolution of the wall motion abnormalities as hoped for.      Mr. Rehman states she feels well and has no complaints of chest pain, shortness of breath, lightheadedness, presyncope, syncope, PND, orthopnea, palpitations or pedal edema.  She does state in general she has not felt very good for the last 3 weeks with diarrhea and her diabetic meds were changed to glipizide; otherwise, she has no complaints.  She does have Lasix prescribed 10 mg p.r.n. but she states she has not taken any.  She does appear to have 1+ pitting edema on exam today.      ASSESSMENT AND PLAN:  A pleasant 76-year-old lady with morbid obesity and atrial fibrillation on anticoagulation with adequate rate control.  She had an event last year, while subtherapeutic potentially consistent with an atypical Takotsubo cardiomyopathy.  Her wall motion abnormalities have all resolved.        Ms. Rehman does express concern for some fatigue and we can change her metoprolol to Toprol-XL 12.5 mg at bedtime.  I would, however, start losartan 25 mg a day as well and hydrochlorothiazide 12.5 mg as replacement and for optimal blood pressure control.  My feeling would be that I would like to continue afterload reduction with medications that can assist  with remodeling for 6 months after normalization of function.  We will have her back in about a week to 10 days' time to recheck labs and blood pressure.  I have suggested that she take 20 mg of Lasix today with 10 mEq of potassium, but then switch over to the hydrochlorothiazide thereafter.  She has verbalized understanding and agrees.      Total time is 30 minutes, 25 in coordination of care and counseling.      Arabella Geller MD      cc:   [place] [address]         ARABELLA GELLER MD             D: 2017 10:28   T: 2017 10:58   MT: [id number]      Name:     KRIS SANDERSON   MRN:      [id number]        Account:      [id number]   :      1940           Service Date: 2017      Document: [id number]

## 2017-05-05 NOTE — TELEPHONE ENCOUNTER
"Savanna calls today with questions about medication changes made yesterday at her OV with Dr. Geller.  Reviewed medication changes with Savanna per OV note.     \"Ms. Rehman does express concern for some fatigue and we can change her metoprolol to Toprol-XL 12.5 mg at bedtime. I would, however, start losartan 25 mg a day as well and hydrochlorothiazide 12.5 mg as replacement and for optimal blood pressure control. My feeling would be that I would like to continue afterload reduction with medications that can assist with remodeling for 6 months after normalization of function. We will have her back in about a week to 10 days' time to recheck labs and blood pressure. I have suggested that she take 20 mg of Lasix today with 10 mEq of potassium, but then switch over to the hydrochlorothiazide thereafter.\"    Pt. States understanding and requests pharmacy be contacted to review these changes as well as she states she contacted them and thinks she \"messed it all up.\" Contacted pts. Pharmacy ([facility]) and clarified medications with Pharmacist.    "

## 2017-05-15 ENCOUNTER — DOCUMENTATION ONLY (OUTPATIENT)
Dept: CARDIOLOGY | Facility: CLINIC | Age: 77
End: 2017-05-15

## 2017-06-05 ENCOUNTER — TELEPHONE (OUTPATIENT)
Dept: CARDIOLOGY | Facility: CLINIC | Age: 77
End: 2017-06-05

## 2017-06-05 NOTE — TELEPHONE ENCOUNTER
"Called pt to verify BMP time prior to OV 6/9/17 w/ Maral Lora PA-C. Pt has cancelled multiple times previously. Last OV 5/4/17 Dr. Geller noted pt was fatigued and and had 1+ pitting edema. Changed from metoprolol to Toprol XL 12.5mg at bedtime; started Losartan and HCTZ; and discontinued furosemide and potassium. Today, pt states she only took Toprol XL, losartan and HCTZ once or twice and was \"so tired\" that she was \"falling asleep while on the toilet\" and \"in bed for 2 days\", so she stopped taking it. Pt has also stopped taking her pravastatin because she just hasn't gone to pick-up her refill; has not taken it for 4 days now. Pt does not want to take the Toprol XL again and states that she was \"promised\" that the Toprol would not make her sleepy so she is upset about this. Pt is supposed to have a [facility] RN come out to her home once per week to take her BP, INR and help her wash her feet, however pt states that there has been confusion on this and they have only come out once. When the homecare RN came out last week, \"her machine didn't work\" so the pt went to [facility] to have her INR drawn and it was 1.7. Pt states her daughter \"is an RN\" and helps her because she lives \"only 6 minutes away\". Advised pt to have her daughter take her to go pickup her pravastatin and start taking that again, however pt states she will wait until Thursday for this because that is when \"someone else\" can help her. Pt states she doesn't think she needs BP meds, states \"I don't have high blood pressure\". When asked her BP at home, she doesn't have a cuff but the one time her home care RN came out her BP was \"122 over 70's\".       Attempted to call [facility] RN Noelle Austin at [phone], no answer, left  for callback to see what pt's BP has been since last OV 5/5/17 w/ Dr. Geller.   Pt is scheduled to see Maral Lora PA-C 6/9/17 w/ BMP prior to OV. Pt states she " will be coming to this appt.   Regine HORTON

## 2017-06-06 NOTE — TELEPHONE ENCOUNTER
Spoke w/ Michelle HORTON at [facility] - pt started home care services 5/24/17. They will forward BP readings for last 2 weeks to RN. Michelle noted that they are supposed to also be helping the pt setup meds.   Awaiting fax of BP's for chart prep.   Regine HORTON

## 2017-06-07 NOTE — TELEPHONE ENCOUNTER
Received VM message from [facility] nurse.  She states that pt was admitted to Home Care on 5/26 and her initial BP was 130/85.  They have been trying to coordinate visits with pt since that time and there have been scheduling conflicts as well as pt resistance to have home care.  They will continue to try and coordinate this care, but currently this is the only BP reading they have.  Pt has scheduled OV with ANNA Lopez on 6/9.  KMortimer RN

## 2017-06-09 ENCOUNTER — OFFICE VISIT (OUTPATIENT)
Dept: CARDIOLOGY | Facility: CLINIC | Age: 77
End: 2017-06-09
Attending: INTERNAL MEDICINE
Payer: MEDICARE

## 2017-06-09 ENCOUNTER — DOCUMENTATION ONLY (OUTPATIENT)
Dept: CARDIOLOGY | Facility: CLINIC | Age: 77
End: 2017-06-09

## 2017-06-09 VITALS
HEART RATE: 72 BPM | SYSTOLIC BLOOD PRESSURE: 120 MMHG | WEIGHT: 288 LBS | HEIGHT: 68 IN | DIASTOLIC BLOOD PRESSURE: 80 MMHG | BODY MASS INDEX: 43.65 KG/M2

## 2017-06-09 DIAGNOSIS — I50.33 ACUTE ON CHRONIC DIASTOLIC (CONGESTIVE) HEART FAILURE (H): Primary | ICD-10-CM

## 2017-06-09 DIAGNOSIS — I50.33 ACUTE ON CHRONIC DIASTOLIC (CONGESTIVE) HEART FAILURE (H): ICD-10-CM

## 2017-06-09 LAB
ALBUMIN SERPL-MCNC: 3.7 G/DL (ref 3.4–5)
ALP SERPL-CCNC: 84 U/L (ref 40–150)
ALT SERPL W P-5'-P-CCNC: 23 U/L (ref 0–50)
ANION GAP SERPL CALCULATED.3IONS-SCNC: 7 MMOL/L (ref 3–14)
AST SERPL W P-5'-P-CCNC: 29 U/L (ref 0–45)
BILIRUB SERPL-MCNC: 1 MG/DL (ref 0.2–1.3)
BUN SERPL-MCNC: 10 MG/DL (ref 7–30)
CALCIUM SERPL-MCNC: 9.3 MG/DL (ref 8.5–10.1)
CHLORIDE SERPL-SCNC: 102 MMOL/L (ref 94–109)
CO2 SERPL-SCNC: 30 MMOL/L (ref 20–32)
CREAT SERPL-MCNC: 0.87 MG/DL (ref 0.52–1.04)
GFR SERPL CREATININE-BSD FRML MDRD: 64 ML/MIN/1.7M2
GLUCOSE SERPL-MCNC: 130 MG/DL (ref 70–99)
POTASSIUM SERPL-SCNC: 4.1 MMOL/L (ref 3.4–5.3)
PROT SERPL-MCNC: 8.2 G/DL (ref 6.8–8.8)
SODIUM SERPL-SCNC: 139 MMOL/L (ref 133–144)

## 2017-06-09 PROCEDURE — 80053 COMPREHEN METABOLIC PANEL: CPT | Performed by: INTERNAL MEDICINE

## 2017-06-09 PROCEDURE — 36415 COLL VENOUS BLD VENIPUNCTURE: CPT | Performed by: INTERNAL MEDICINE

## 2017-06-09 PROCEDURE — 99214 OFFICE O/P EST MOD 30 MIN: CPT | Performed by: PHYSICIAN ASSISTANT

## 2017-06-09 NOTE — PROGRESS NOTES
HPI and Plan:   See dictation  [id number]    Orders this Visit:  Orders Placed This Encounter   Procedures     Basic metabolic panel     Follow-Up with Cardiac Advanced Practice Provider     No orders of the defined types were placed in this encounter.    Medications Discontinued During This Encounter   Medication Reason     losartan (COZAAR) 25 MG tablet          Encounter Diagnosis   Name Primary?     Acute on chronic diastolic (congestive) heart failure (H)        CURRENT MEDICATIONS:  Current Outpatient Prescriptions   Medication Sig Dispense Refill     metoprolol (TOPROL-XL) 25 MG 24 hr tablet Take 0.5 tablets (12.5 mg) by mouth daily At bedtime 30 tablet 11     hydrochlorothiazide (MICROZIDE) 12.5 MG capsule Take 1 capsule (12.5 mg) by mouth daily 30 capsule 11     triamcinolone (KENALOG) 0.1 % cream Apply topically 2 times daily       biotin (BIOTIN 5000) 5 MG CAPS        ranitidine (ZANTAC) 150 MG capsule Take by mouth 2 times daily       pravastatin (PRAVACHOL) 20 MG tablet Take 20 mg by mouth At Bedtime   1     glipiZIDE (GLUCOTROL XL) 2.5 MG 24 hr tablet Take 2.5 mg by mouth daily  1     WARFARIN SODIUM PO Take 2.5 mg by mouth Take on Mon, Tues, Wed, Thurs, Fri, Sat (6 days a week)       WARFARIN SODIUM PO Take 5 mg by mouth on Sundays       Ibuprofen (ADVIL PO) Take 400 mg by mouth every 8 hours as needed for moderate pain       LORazepam (ATIVAN) 0.5 MG tablet Take 1 tablet (0.5 mg) by mouth every 4 hours as needed for anxiety or other (chest pain) 12 tablet 0     OXYBUTYNIN CHLORIDE PO Take 5 mg by mouth 2 times daily Clarified as immediate release with [facility]./Patient         ALLERGIES  Allergies   Allergen Reactions     Petroleum Jelly [Petrolatum] Anaphylaxis     Rash and swelling     Shellfish-Derived Products Anaphylaxis     Tongue swelling     Aspirin Swelling     tiongue swelling     Bacitracin      Rash swelling     Coumadin [Warfarin] Swelling     Leg swelling     Darvon [Propoxyphene] Swelling      Throat closes     Dilaudid [Hydromorphone]      Levaquin [Levofloxacin] Swelling     Tongue swelling     Neosporin [Neomycin-Polymyx-Gramicid] Swelling     rash     Oxycodone      Severe itching     Percodan [Oxycodone-Aspirin]      Severe itching     Tramadol      Vicodin [Hydrocodone-Acetaminophen]      Severe itching       Xarelto [Rivaroxaban]      Adhesive Tape Rash     Band aids      Codeine Rash       PAST MEDICAL HISTORY:  Past Medical History:   Diagnosis Date     Anemia     Iron Deficiency anemia     Arrhythmia     Afib     Atrial fibrillation (H)      CAD (coronary artery disease)     non-obstructive     Congestive heart failure (H)      Degenerative disk disease      Fibromyalgia      Gastro-oesophageal reflux disease      Hiatal hernia      History of blood transfusion      Neuropathy (H)      Other chronic pain     neck, low back, legs     Pernicious anemia      Sleep apnea     uses CPAP.     Urinary incontinence      Vitamin D deficiency        PAST SURGICAL HISTORY:  Past Surgical History:   Procedure Laterality Date     APPENDECTOMY       C RAD RESEC TONSIL/PILLARS       CHOLECYSTECTOMY       COLONOSCOPY  3/15/2011     CORONARY ANGIOGRAPHY ADULT ORDER       HEART CATH LEFT HEART CATH  12/30/16    medication management     HYSTERECTOMY TOTAL ABDOMINAL       KNEE SURGERY Left      LAPAROSCOPIC NISSEN FUNDOPLICATION N/A 2/4/2015    Procedure: LAPAROSCOPIC NISSEN FUNDOPLICATION;  Surgeon: Armando Ansari MD;  Location:  OR     ORTHOPEDIC SURGERY      joint replacement     ORTHOPEDIC SURGERY Left     knee replacement     ORTHOPEDIC SURGERY      ulnar transposition     SOFT TISSUE SURGERY Left     lt elbow mass, ulnar transposition       FAMILY HISTORY:  Family History   Problem Relation Age of Onset     Unknown/Adopted No family hx of        SOCIAL HISTORY:  Social History     Social History     Marital status:      Spouse name: N/A     Number of children: N/A     Years of education: N/A  "    Social History Main Topics     Smoking status: Former Smoker     Types: Cigarettes     Quit date: 9/1/2014     Smokeless tobacco: None     Alcohol use Yes      Comment: socially.     Drug use: None     Sexual activity: Not Asked     Other Topics Concern     None     Social History Narrative       Review of Systems:  Skin:  Positive for itching   Eyes:  Positive for glasses  ENT:  Negative    Respiratory:  Positive for cough;sleep apnea  Cardiovascular:    Positive for;edema;fatigue  Gastroenterology: Positive for diarrhea;vomiting  Genitourinary:  Positive for nocturia;urinary frequency  Musculoskeletal:  Positive for back pain;arthritis;joint pain;nocturnal cramping;foot pain  Neurologic:  Positive for numbness or tingling of feet;headaches  Psychiatric:       Heme/Lymph/Imm:  Positive for easy bruising;allergies  Endocrine:  Positive for diabetes      Physical Exam:  Vitals: /80 (BP Location: Right arm, Patient Position: Chair, Cuff Size: Adult Regular)  Pulse 72  Ht 1.727 m (5' 8\")  Wt 130.6 kg (288 lb)  BMI 43.79 kg/m2    Constitutional:  cooperative, alert and oriented, well developed, well nourished, in no acute distress morbidly obese      Skin:  warm and dry to the touch, no apparent skin lesions or masses noted        Head:  normocephalic, no masses or lesions        Eyes:  pupils equal and round;conjunctivae and lids unremarkable;sclera white;no xanthalasma        ENT:  no pallor or cyanosis        Neck:  carotid pulses are full and equal bilaterally   JVP not well visualized    Chest:  normal breath sounds, clear to auscultation, normal A-P diameter, normal symmetry, normal respiratory excursion, no use of accessory muscles        Cardiac: no murmurs, gallops or rubs detected irregularly irregular rhythm distant heart sounds              Abdomen:  abdomen soft;BS normoactive;non-tender obese      Vascular: not assessed this visit                                      Extremities and Back:  no " deformities, clubbing, cyanosis, erythema observed        Neurological:  no gross motor deficits;affect appropriate, oriented to time, person and place          Recent Lab Results:  LIPID RESULTS:  No results found for: CHOL, HDL, LDL, TRIG, CHOLHDLRATIO    LIVER ENZYME RESULTS:  Lab Results   Component Value Date    AST 29 06/09/2017    ALT 23 06/09/2017       CBC RESULTS:  Lab Results   Component Value Date    WBC 6.6 04/14/2017    RBC 4.60 04/14/2017    HGB 13.0 04/14/2017    HCT 40.3 04/14/2017    MCV 88 04/14/2017    MCH 28.3 04/14/2017    MCHC 32.3 04/14/2017    RDW 15.7 (H) 04/14/2017     04/14/2017       BMP RESULTS:  Lab Results   Component Value Date     06/09/2017    POTASSIUM 4.1 06/09/2017    CHLORIDE 102 06/09/2017    CO2 30 06/09/2017    ANIONGAP 7 06/09/2017     (H) 06/09/2017    BUN 10 06/09/2017    CR 0.87 06/09/2017    GFRESTIMATED 64 06/09/2017    GFRESTBLACK 77 06/09/2017    SAUL 9.3 06/09/2017        A1C RESULTS:  Lab Results   Component Value Date    A1C 6.6 (H) 08/25/2015       INR RESULTS:  Lab Results   Component Value Date    INR 1.49 (H) 01/01/2017    INR 1.44 (H) 12/31/2016           CC  Dottie Geller MD   [facility]  [address]  [place] [address]

## 2017-06-09 NOTE — PATIENT INSTRUCTIONS
1.  Since you have not been taking the Toprol XL, losartan or HCTZ - and the blood pressure is controlled, for now start the HCTZ and the Toprol XL.   2.  Continue these for a couple weeks as sometimes you tolerate them better after a couple weeks.   3.  Come back in about 2 weeks to recheck blood pressure and symptoms on the medicine. Another blood test in 2 weeks.

## 2017-06-09 NOTE — MR AVS SNAPSHOT
After Visit Summary   6/9/2017    Savanna Rehman    MRN: [id number]           Patient Information     Date Of Birth          1940        Visit Information        Provider Department      6/9/2017 3:50 PM Maral Lora PA-C [facility] AT [place]        Today's Diagnoses     Acute on chronic diastolic (congestive) heart failure (H)          Care Instructions    1.  Since you have not been taking the Toprol XL, losartan or HCTZ - and the blood pressure is controlled, for now start the HCTZ and the Toprol XL.   2.  Continue these for a couple weeks as sometimes you tolerate them better after a couple weeks.   3.  Come back in about 2 weeks to recheck blood pressure and symptoms on the medicine. Another blood test in 2 weeks.             Follow-ups after your visit        Additional Services     Follow-Up with Cardiac Advanced Practice Provider                 Your next 10 appointments already scheduled     Jun 27, 2017 12:00 PM CDT   LAB with RU LAB   [facility]    [address] [place] [address] [address]  [facility] [address] [phone]           Patient must bring picture ID.  Patient should be prepared to give a urine specimen  Please do not eat 10-12 hours before your appointment if you are coming in fasting for labs on lipids, cholesterol, or glucose (sugar).  Pregnant women should follow their Care Team instructions. Water with medications is okay. Do not drink coffee or other fluids.   If you have concerns about taking  your medications, please ask at office or if scheduling via [employer], send a message by clicking on Secure Messaging, Message Your Care Team.            Jun 27, 2017  1:10 PM CDT   Return Visit with Maral Lora PA-C   [facility] ([facility])    [address] [place] [address] [address]  [facility] [id number] [phone]            Aug 18,  "  2:15 PM CDT   Return Visit with Dottie Geller MD   [facility] [place] ([facility])    [address] [facility] [address]  [facility] [address] [phone]              Future tests that were ordered for you today     Open Future Orders        Priority Expected Expires Ordered    Basic metabolic panel Routine 2017    Follow-Up with Cardiac Advanced Practice Provider Routine 2017            Who to contact     If you have questions or need follow up information about today's clinic visit or your schedule please contact [facility] [place] directly at [phone].  Normal or non-critical lab and imaging results will be communicated to you by [employer], letter or phone within 4 business days after the clinic has received the results. If you do not hear from us within 7 days, please contact the clinic through [employer] or phone. If you have a critical or abnormal lab result, we will notify you by phone as soon as possible.  Submit refill requests through [employer] or call your pharmacy and they will forward the refill request to us. Please allow 3 business days for your refill to be completed.          Additional Information About Your Visit        [employer] Information     [employer] lets you send messages to your doctor, view your test results, renew your prescriptions, schedule appointments and more. To sign up, go to [email or web address] . Click on \"Log in\" on the left side of the screen, which will take you to the Welcome page. Then click on \"Sign up Now\" on the right side of the page.     You will be asked to enter the access code listed below, as well as some personal information. Please follow the directions to create your username and password.     Your access code is: [email or web address]  Expires: 2017 10:19 AM     Your access code will  in 90 days. If you need help or a new code, " "please call your [place] clinic or [phone].        Care EveryWhere ID     This is your Care EveryWhere ID. This could be used by other organizations to access your [place] medical records  [id number]        Your Vitals Were     Pulse Height BMI (Body Mass Index)             72 1.727 m (5' 8\") 43.79 kg/m2          Blood Pressure from Last 3 Encounters:   06/09/17 120/80   05/04/17 130/88   04/14/17 130/70    Weight from Last 3 Encounters:   06/09/17 130.6 kg (288 lb)   05/04/17 131.1 kg (289 lb)   04/14/17 131.5 kg (290 lb)              We Performed the Following     Follow-Up with Cardiac Advanced Practice Provider          Today's Medication Changes          These changes are accurate as of: 6/9/17  4:31 PM.  If you have any questions, ask your nurse or doctor.               Stop taking these medicines if you haven't already. Please contact your care team if you have questions.     losartan 25 MG tablet   Commonly known as:  COZAAR   Stopped by:  Maral Lora PA-C                    Primary Care Provider Office Phone # Fax #    Hilary Finney [phone]       [facility] [address] [facility] [address]        Thank you!     Thank you for choosing [facility] [place]  for your care. Our goal is always to provide you with excellent care. Hearing back from our patients is one way we can continue to improve our services. Please take a few minutes to complete the written survey that you may receive in the mail after your visit with us. Thank you!             Your Updated Medication List - Protect others around you: Learn how to safely use, store and throw away your medicines at www.disposemymeds.org.          This list is accurate as of: 6/9/17  4:31 PM.  Always use your most recent med list.                   Brand Name Dispense Instructions for use    ADVIL PO      Take 400 mg by mouth every 8 hours as needed for moderate pain       " BIOTIN 5000 5 MG Caps   Generic drug:  biotin          glipiZIDE 2.5 MG 24 hr tablet    GLUCOTROL XL     Take 2.5 mg by mouth daily       hydrochlorothiazide 12.5 MG capsule    MICROZIDE    30 capsule    Take 1 capsule (12.5 mg) by mouth daily       LORazepam 0.5 MG tablet    ATIVAN    12 tablet    Take 1 tablet (0.5 mg) by mouth every 4 hours as needed for anxiety or other (chest pain)       metoprolol 25 MG 24 hr tablet    TOPROL-XL    30 tablet    Take 0.5 tablets (12.5 mg) by mouth daily At bedtime       OXYBUTYNIN CHLORIDE PO      Take 5 mg by mouth 2 times daily Clarified as immediate release with [facility]./Patient       pravastatin 20 MG tablet    PRAVACHOL     Take 20 mg by mouth At Bedtime       ranitidine 150 MG capsule    ZANTAC     Take by mouth 2 times daily       triamcinolone 0.1 % cream    KENALOG     Apply topically 2 times daily       * WARFARIN SODIUM PO      Take 2.5 mg by mouth Take on Mon, Tues, Wed, Thurs, Fri, Sat (6 days a week)       * WARFARIN SODIUM PO      Take 5 mg by mouth on Sundays       * Notice:  This list has 2 medication(s) that are the same as other medications prescribed for you. Read the directions carefully, and ask your doctor or other care provider to review them with you.

## 2017-06-09 NOTE — PROGRESS NOTES
Order entered for CMP to be drawn today prior to OV with ASellradha, PAC.  Per Dr. Geller.  KMortimer RN

## 2017-06-09 NOTE — LETTER
6/9/2017    Hilary Finney MD  [facility]   [address]  [facility] [address]    RE: Savanna Sosakarie       Dear Colleague,    I had the pleasure of seeing Savanna Rehman in the [facility].    I had the pleasure of meeting Savanna Rehman today in the Cardiology Clinic for followup.  Savanna is a very pleasant 76-year-old woman who has a history of diastolic dysfunction, chronic atrial fibrillation, morbid obesity, fibromyalgia, sleep apnea on CPAP intermittently, diabetes mellitus type 2, dyslipidemia, who was seen at the end of 12/2016 in the hospital due to shortness of breath, hypoxemia, RVR with elevated troponins.  An echocardiogram showed LVEF 50% with new mid to distal anteroseptal hypokinesis.  Cardiac catheterization was undertaken which showed no evidence of obstructive disease, however, did confirm the wall motion abnormalities that were seen on echocardiography.  She was started on medical therapy, though did have difficulty taking diuretics due to frequent urinary tract infections and incontinence.  Dr. Geller did try to send her for an MRI to follow up on the new wall motion abnormality; however, she was intolerant due to anxiety.  Fortunately, she underwent an echocardiogram 05/02/2017 that showed LVEF 55%-60% with no regional wall motion abnormalities.      When she saw Dr. Geller on 05/04/2017, she was doing quite well without any chest pain or shortness of breath.  She did have some pedal edema, though again she was not taking her Lasix.  She noted some fatigue at that time.  Dr. Geller change her metoprolol 25 mg twice daily to Toprol-XL 12.5 mg daily at bedtime.  She also stopped the p.r.n. Lasix and put her on hydrochlorothiazide 12.5 mg daily.  She also started losartan 25 mg daily to continue afterload reduction, which can assist with remodeling for 6 months after normalization of LV function.      She presents today  for followup.  She tells me that she only took a couple of days of the Toprol-XL, as she was more fatigued with it.  She took a couple days of hydrochlorothiazide and maybe took a couple of days of losartan but has not been taking any of her medications for a few weeks because 1 she felt more fatigued with the Toprol-XL, and she reports she was urinating too frequently with the hydrochlorothiazide and she says her blood pressure is controlled, so she does not need the losartan.  We had a long discussion about the Toprol-XL dose being significantly lower than what she was on previously and that sometimes it takes a few days up to a couple of weeks to develop a better tolerance of the medication.  We also discussed that with her swelling in her legs, it is important to take a diuretic to help improve the swelling and improve the discomfort.  We did note that her blood pressure is well controlled today at 120/80 without any antihypertensive medications on board.  Savanna denies any chest pain, significant dyspnea, palpitations, syncope.  She does have some chronic lower extremity edema which is typically worse on the left but today is worse on the right.  She has some mobility issues.      PHYSICAL EXAMINATION:   VITAL SIGNS:  Blood pressure 120/80, pulse 72, weight 288 pounds, BMI 43.88.   GENERAL:  A 76-year-old woman in no apparent distress, morbidly obese.   NECK:  Thick, difficult to assess jugular venous pressure.   LUNGS:  Clear.   CARDIAC:  Distant heart sounds, irregularly irregular S1, S2.  I do not appreciate any significant murmurs.   ABDOMEN:  Obese, soft, bowel sounds positive, nontender.   EXTREMITIES:  Warm.  She has 1+ pitting edema, right greater than left.   NEUROLOGIC:  Alert and oriented x3, no focal deficits.      DIAGNOSTICS:    06/09/2017 Basic metabolic panel:  Sodium 139, potassium 4.1, BUN 10, creatinine 0.87, GFR 64.      Outpatient Encounter Prescriptions as of 6/9/2017   Medication Sig  Dispense Refill     [DISCONTINUED] metoprolol (TOPROL-XL) 25 MG 24 hr tablet Take 0.5 tablets (12.5 mg) by mouth daily At bedtime (Patient not taking: Reported on 8/23/2017) 30 tablet 11     [DISCONTINUED] hydrochlorothiazide (MICROZIDE) 12.5 MG capsule Take 1 capsule (12.5 mg) by mouth daily 30 capsule 11     triamcinolone (KENALOG) 0.1 % cream Apply topically 2 times daily       biotin (BIOTIN 5000) 5 MG CAPS        ranitidine (ZANTAC) 150 MG capsule Take by mouth 2 times daily       pravastatin (PRAVACHOL) 20 MG tablet Take 20 mg by mouth At Bedtime ON HOLD   1     glipiZIDE (GLUCOTROL XL) 2.5 MG 24 hr tablet Take 2.5 mg by mouth daily  1     WARFARIN SODIUM PO Take 2.5 mg by mouth Take on Mon, Tues, Wed, Thurs, Fri, Sat (6 days a week)       WARFARIN SODIUM PO Take 5 mg by mouth on Sundays       Ibuprofen (ADVIL PO) Take 400 mg by mouth every 8 hours as needed for moderate pain       LORazepam (ATIVAN) 0.5 MG tablet Take 1 tablet (0.5 mg) by mouth every 4 hours as needed for anxiety or other (chest pain) (Patient taking differently: Take 0.5 mg by mouth as needed for anxiety or other (chest pain) ) 12 tablet 0     OXYBUTYNIN CHLORIDE PO Take 5 mg by mouth 2 times daily Clarified as immediate release with [facility]Patient       [DISCONTINUED] losartan (COZAAR) 25 MG tablet Take 1 tablet (25 mg) by mouth daily 30 tablet 11     Facility-Administered Encounter Medications as of 6/9/2017   Medication Dose Route Frequency Provider Last Rate Last Dose     nitroglycerin 100 MCG/ML injection              ASSESSMENT AND PLAN:  Savanna is a very pleasant 76-year-old woman with morbid obesity, chronic atrial fibrillation on chronic anticoagulation, an event in 12/2016 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      The medication changes  that Dr. Geller suggested were taken just for a couple of days.  I did go through the rationale for why she should be on each of these medications.  With her history of atrial fibrillation, it is important to be on a rate-controlling agent, and therefore she will trial Toprol-XL 12.5 mg once daily in the evening.  She will give it at least a week or 2 to see if she tolerates it over time.  Of course, she is also on this with her history of the wall motion abnormalities potentially consistent with an atypical takotsubo cardiomyopathy.  She will try to take the hydrochlorothiazide 12.5 mg daily due to her lower extremity edema, as this is for symptomatic improvement.  Given her blood pressure is normal today without any antihypertensive agents on board, she will hold off on taking her losartan at this time.  She will come back in 2 weeks to recheck her blood pressure and followup on her swelling and tolerance to medications.  She will have a basic metabolic panel drawn at that time, as she will have been on hydrochlorothiazide for a couple of weeks consistently.      I do question whether or not the Toprol is causing her fatigue.  She has multiple other reasons that could be contributing such as her morbid obesity, untreated sleep apnea.  If she is unable to tolerate both metoprolol tartrate and metoprolol succinate, we could consider an agent such as atenolol or carvedilol in the future.  I do note she has no prescription drug coverage, so when we try a new medication, she is paying out of pocket for everything.      Thank you very much for allowing me to participate in the care of Savanna Rehman.     Sincerely,    Maral Lora PA-C     [facility]

## 2017-06-12 NOTE — PROGRESS NOTES
HISTORY OF PRESENT ILLNESS:  I had the pleasure of meeting Savanna Rehman today in the Cardiology Clinic for followup.  Savanna is a very pleasant 76-year-old woman who has a history of diastolic dysfunction, chronic atrial fibrillation, morbid obesity, fibromyalgia, sleep apnea on CPAP intermittently, diabetes mellitus type 2, dyslipidemia, who was seen at the end of 12/2016 in the hospital due to shortness of breath, hypoxemia, RVR with elevated troponins.  An echocardiogram showed LVEF 50% with new mid to distal anteroseptal hypokinesis.  Cardiac catheterization was undertaken which showed no evidence of obstructive disease, however, did confirm the wall motion abnormalities that were seen on echocardiography.  She was started on medical therapy, though did have difficulty taking diuretics due to frequent urinary tract infections and incontinence.  Dr. Geller did try to send her for an MRI to follow up on the new wall motion abnormality; however, she was intolerant due to anxiety.  Fortunately, she underwent an echocardiogram 05/02/2017 that showed LVEF 55%-60% with no regional wall motion abnormalities.      When she saw Dr. Geller on 05/04/2017, she was doing quite well without any chest pain or shortness of breath.  She did have some pedal edema, though again she was not taking her Lasix.  She noted some fatigue at that time.  Dr. Geller change her metoprolol 25 mg twice daily to Toprol-XL 12.5 mg daily at bedtime.  She also stopped the p.r.n. Lasix and put her on hydrochlorothiazide 12.5 mg daily.  She also started losartan 25 mg daily to continue afterload reduction, which can assist with remodeling for 6 months after normalization of LV function.      She presents today for followup.  She tells me that she only took a couple of days of the Toprol-XL, as she was more fatigued with it.  She took a couple days of hydrochlorothiazide and maybe took a couple of days of losartan but has  not been taking any of her medications for a few weeks because 1 she felt more fatigued with the Toprol-XL, and she reports she was urinating too frequently with the hydrochlorothiazide and she says her blood pressure is controlled, so she does not need the losartan.  We had a long discussion about the Toprol-XL dose being significantly lower than what she was on previously and that sometimes it takes a few days up to a couple of weeks to develop a better tolerance of the medication.  We also discussed that with her swelling in her legs, it is important to take a diuretic to help improve the swelling and improve the discomfort.  We did note that her blood pressure is well controlled today at 120/80 without any antihypertensive medications on board.  Savanna denies any chest pain, significant dyspnea, palpitations, syncope.  She does have some chronic lower extremity edema which is typically worse on the left but today is worse on the right.  She has some mobility issues.      PHYSICAL EXAMINATION:   VITAL SIGNS:  Blood pressure 120/80, pulse 72, weight 288 pounds, BMI 43.88.   GENERAL:  A 76-year-old woman in no apparent distress, morbidly obese.   NECK:  Thick, difficult to assess jugular venous pressure.   LUNGS:  Clear.   CARDIAC:  Distant heart sounds, irregularly irregular S1, S2.  I do not appreciate any significant murmurs.   ABDOMEN:  Obese, soft, bowel sounds positive, nontender.   EXTREMITIES:  Warm.  She has 1+ pitting edema, right greater than left.   NEUROLOGIC:  Alert and oriented x3, no focal deficits.      DIAGNOSTICS:    06/09/2017 Basic metabolic panel:  Sodium 139, potassium 4.1, BUN 10, creatinine 0.87, GFR 64.      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      The medication changes that Dr. Geller suggested were taken just for a couple of days.  I did go through the rationale for why she should be on each of these medications.  With her history of atrial fibrillation, it is important to be on a rate-controlling agent, and therefore she will trial Toprol-XL 12.5 mg once daily in the evening.  She will give it at least a week or 2 to see if she tolerates it over time.  Of course, she is also on this with her history of the wall motion abnormalities potentially consistent with an atypical takotsubo cardiomyopathy.  She will try to take the hydrochlorothiazide 12.5 mg daily due to her lower extremity edema, as this is for symptomatic improvement.  Given her blood pressure is normal today without any antihypertensive agents on board, she will hold off on taking her losartan at this time.  She will come back in 2 weeks to recheck her blood pressure and followup on her swelling and tolerance to medications.  She will have a basic metabolic panel drawn at that time, as she will have been on hydrochlorothiazide for a couple of weeks consistently.      I do question whether or not the Toprol is causing her fatigue.  She has multiple other reasons that could be contributing such as her morbid obesity, untreated sleep apnea.  If she is unable to tolerate both metoprolol tartrate and metoprolol succinate, we could consider an agent such as atenolol or carvedilol in the future.  I do note she has no prescription drug coverage, so when we try a new medication, she is paying out of pocket for everything.      Thank you very much for allowing me to participate in the care of Savanna Sanderson.         BHUMI PANIAGUA PA-C             D: 06/09/2017 16:39   T: 06/12/2017 05:51   MT: CHIKI      Name:     SAVANNA SANDERSON   MRN:      [id number]        Account:       [id number]   :      1940           Service Date: 2017      Document: [id number]

## 2017-06-13 ENCOUNTER — TELEPHONE (OUTPATIENT)
Dept: CARDIOLOGY | Facility: CLINIC | Age: 77
End: 2017-06-13

## 2017-06-13 NOTE — TELEPHONE ENCOUNTER
I doubt that they are related given she did not have diarrhea or vomiting after she took it last time. In addition it continues while not on it.   For now, just have her hold it for a few days until sxs resolve.   Then trial it again.   Thanks.

## 2017-06-13 NOTE — TELEPHONE ENCOUNTER
"Savanna calls clinic today to report suspected side effects from her medications.  Savanna states she started taking her Toprol-XL 12.5mg on Friday night following her OV appointment with Maral Lora on 6/9/17.  Pt states she promised MELANI she would try to take her medications as she now understands how important they are after having a long discussion about her medication regimen.  Savanna states she started to develop diarrhea Saturday evening which has continued and vomiting as well began on Sunday.  She states she does want to be able to take the medications but is unable to d/t the diarrhea and vomiting. Of note, pt only restarted the metoprolol and has not restarted the HCTZ as recommended at OV stating having to go to the restroom all of the time interferes with her day. Pt does not have a BP cuff at home.      Per OV note,   \"She tells me that she only took a couple of days of the Toprol-XL, as she was more fatigued with it.  She took a couple days of hydrochlorothiazide and maybe took a couple of days of losartan but has not been taking any of her medications for a few weeks because 1 she felt more fatigued with the Toprol-XL, and she reports she was urinating too frequently with the hydrochlorothiazide and she says her blood pressure is controlled, so she does not need the losartan.  We had a long discussion about the Toprol-XL dose being significantly lower than what she was on previously and that sometimes it takes a few days up to a couple of weeks to develop a better tolerance of the medication.  We also discussed that with her swelling in her legs, it is important to take a diuretic to help improve the swelling and improve the discomfort. ..If she is unable to tolerate both metoprolol tartrate and metoprolol succinate, we could consider an agent such as atenolol or carvedilol in the future (AS 6/9/17).     Will route encounter to Maral Lora for recommendations.       "

## 2017-06-13 NOTE — TELEPHONE ENCOUNTER
Called patient and discussed recommendations from Maral Lora.  Pt agreeable to holding Toprol-XL for a few days until symptoms resolve and then trying the medication again.  Advise patient to contact clinic again if symptoms return upon restarting Toprol-XL.  Pt states understanding.

## 2017-08-18 ENCOUNTER — PRE VISIT (OUTPATIENT)
Dept: CARDIOLOGY | Facility: CLINIC | Age: 77
End: 2017-08-18

## 2017-08-23 ENCOUNTER — OFFICE VISIT (OUTPATIENT)
Dept: CARDIOLOGY | Facility: CLINIC | Age: 77
End: 2017-08-23
Payer: MEDICARE

## 2017-08-23 VITALS
DIASTOLIC BLOOD PRESSURE: 91 MMHG | WEIGHT: 288 LBS | OXYGEN SATURATION: 96 % | SYSTOLIC BLOOD PRESSURE: 138 MMHG | HEART RATE: 73 BPM | HEIGHT: 68 IN | BODY MASS INDEX: 43.65 KG/M2

## 2017-08-23 DIAGNOSIS — I48.0 PAROXYSMAL ATRIAL FIBRILLATION (H): ICD-10-CM

## 2017-08-23 DIAGNOSIS — G47.33 OSA (OBSTRUCTIVE SLEEP APNEA): Primary | ICD-10-CM

## 2017-08-23 DIAGNOSIS — I50.33 ACUTE ON CHRONIC DIASTOLIC (CONGESTIVE) HEART FAILURE (H): ICD-10-CM

## 2017-08-23 DIAGNOSIS — G47.33 OSA (OBSTRUCTIVE SLEEP APNEA): ICD-10-CM

## 2017-08-23 DIAGNOSIS — I10 BENIGN ESSENTIAL HYPERTENSION: ICD-10-CM

## 2017-08-23 LAB
ANION GAP SERPL CALCULATED.3IONS-SCNC: 7 MMOL/L (ref 3–14)
BUN SERPL-MCNC: 15 MG/DL (ref 7–30)
CALCIUM SERPL-MCNC: 9.2 MG/DL (ref 8.5–10.1)
CHLORIDE SERPL-SCNC: 104 MMOL/L (ref 94–109)
CK SERPL-CCNC: 57 U/L (ref 30–225)
CO2 SERPL-SCNC: 27 MMOL/L (ref 20–32)
CREAT SERPL-MCNC: 0.86 MG/DL (ref 0.52–1.04)
GFR SERPL CREATININE-BSD FRML MDRD: 64 ML/MIN/1.7M2
GLUCOSE SERPL-MCNC: 126 MG/DL (ref 70–99)
POTASSIUM SERPL-SCNC: 4.2 MMOL/L (ref 3.4–5.3)
SODIUM SERPL-SCNC: 138 MMOL/L (ref 133–144)

## 2017-08-23 PROCEDURE — 36415 COLL VENOUS BLD VENIPUNCTURE: CPT | Performed by: PHYSICIAN ASSISTANT

## 2017-08-23 PROCEDURE — 82550 ASSAY OF CK (CPK): CPT | Performed by: PHYSICIAN ASSISTANT

## 2017-08-23 PROCEDURE — 80048 BASIC METABOLIC PNL TOTAL CA: CPT | Performed by: PHYSICIAN ASSISTANT

## 2017-08-23 PROCEDURE — 99214 OFFICE O/P EST MOD 30 MIN: CPT | Performed by: INTERNAL MEDICINE

## 2017-08-23 RX ORDER — LOSARTAN POTASSIUM 25 MG/1
25 TABLET ORAL DAILY
Qty: 30 TABLET | Refills: 11
Start: 2017-08-23 | End: 2017-11-10

## 2017-08-23 NOTE — MR AVS SNAPSHOT
After Visit Summary   8/23/2017    Savanna Rehman    MRN: [id number]           Patient Information     Date Of Birth          1940        Visit Information        Provider Department      8/23/2017 1:45 PM Dottie Geller MD [facility] [place]        Today's Diagnoses     CRISTIANA (obstructive sleep apnea)    -  1    Paroxysmal atrial fibrillation (H)        Benign essential hypertension           Follow-ups after your visit        Additional Services     Sleep Study Referral       Please be aware that coverage of these services is subject to the terms and limitations of your health insurance plan.  Call member services at your health plan with any benefit or coverage questions.      Please bring the following to your appointment:    >>   List of current medications   >>   This referral request   >>   Any documents/labs given to you for this referral    [facility]  Ph [phone]  (Age 13 if over 100 lbs and up)  [facility] [phone]                  Your next 10 appointments already scheduled     Sep 08, 2017 10:00 AM CDT   Holter Monitor with RS[facility] DEVICE [facility] ([facility])    [address]  [place] [id number] [phone]           LOCATION - [address], [address] [place] [address] **Please check-in at the [facility] Office, [address], in the [facility] building. When you are finished registering, please go to [address] and have a seat.            Sep 08, 2017 10:45 AM CDT   LAB with RU LAB   [facility] [place] ([facility])    [address]  [place] [id number] [phone]           Patient must bring picture ID. Patient should be prepared to give a urine specimen  Please do not eat 10-12 hours before your appointment  if you are coming in fasting for labs on lipids, cholesterol, or glucose (sugar). Pregnant women should follow their Care Team instructions. Water with medications is okay. Do not drink coffee or other fluids. If you have concerns about taking  your medications, please ask at office or if scheduling via Vizibility, send a message by clicking on Secure Messaging, Message Your Care Team.            Sep 20, 2017  2:30 PM CDT   Return Visit with Maral Lora PA-C   [facility] ([facility])    [address]  [place] [id number] [phone]            Oct 16, 2017  2:15 PM CDT   Return Visit with Dottie Geller MD   [facility] ([facility])    [address]  [place] [id number] [phone]              Future tests that were ordered for you today     Open Future Orders        Priority Expected Expires Ordered    Comprehensive metabolic panel Routine 9/6/2017 9/30/2017 8/23/2017    Holter Monitor 48 hour - Adult Routine 9/6/2017 10/7/2017 8/23/2017    Sleep Study Referral Routine 8/23/2017 8/23/2018 8/23/2017            Who to contact     If you have questions or need follow up information about today's clinic visit or your schedule please contact [facility] directly at [phone].  Normal or non-critical lab and imaging results will be communicated to you by MyChart, letter or phone within 4 business days after the clinic has received the results. If you do not hear from us within 7 days, please contact the clinic through [employer]box[employer] or phone. If you have a critical or abnormal lab result, we will notify you by phone as soon as possible.  Submit refill requests through Vizibility or call your pharmacy and they will forward the refill request to us. Please allow 3 business days for your refill to be completed.          Additional  "Information About Your Visit        MyChart Information     GotGame lets you send messages to your doctor, view your test results, renew your prescriptions, schedule appointments and more. To sign up, go to [email or web address] . Click on \"Log in\" on the left side of the screen, which will take you to the Welcome page. Then click on \"Sign up Now\" on the right side of the page.     You will be asked to enter the access code listed below, as well as some personal information. Please follow the directions to create your username and password.     Your access code is: [email or web address]  Expires: 2017  3:26 PM     Your access code will  in 90 days. If you need help or a new code, please call your [place] clinic or [phone].        Care EveryWhere ID     This is your Care EveryWhere ID. This could be used by other organizations to access your [facility] medical records  [id number]        Your Vitals Were     Pulse Height Pulse Oximetry BMI (Body Mass Index)          73 1.727 m (5' 8\") 96% 43.79 kg/m2         Blood Pressure from Last 3 Encounters:   17 (!) 138/91   17 120/80   17 130/88    Weight from Last 3 Encounters:   17 130.6 kg (288 lb)   17 130.6 kg (288 lb)   17 131.1 kg (289 lb)                 Today's Medication Changes          These changes are accurate as of: 17  4:03 PM.  If you have any questions, ask your nurse or doctor.               Start taking these medicines.        Dose/Directions    losartan 25 MG tablet   Commonly known as:  COZAAR   Used for:  Paroxysmal atrial fibrillation (H), CRISTIANA (obstructive sleep apnea), Benign essential hypertension   Started by:  Dottie Geller MD        Dose:  25 mg   Take 1 tablet (25 mg) by mouth daily   Quantity:  30 tablet   Refills:  11         These medicines have changed or have updated prescriptions.        Dose/Directions    LORazepam 0.5 MG tablet   Commonly known as:  ATIVAN   This may have " changed:  when to take this   Used for:  Chest pain        Dose:  0.5 mg   Take 1 tablet (0.5 mg) by mouth every 4 hours as needed for anxiety or other (chest pain)   Quantity:  12 tablet   Refills:  0            Where to get your medicines      Some of these will need a paper prescription and others can be bought over the counter.  Ask your nurse if you have questions.     You don't need a prescription for these medications     losartan 25 MG tablet                Primary Care Provider Office Phone # Fax #    Hilary Lucy Finney [phone]       [facility] [address] [facility] [address]        Equal Access to Services     [facility]: Hadii aad ku hadasho Soomaali, waaxda luqadaha, qaybta kaalmada adekristieyada, hanny hendrickson . So [facility] [phone].    ATENCIÓN: Si habla español, tiene a maier disposición servicios gratuitos de asistencia lingüística. Llame al [phone].    We comply with applicable federal civil rights laws and [place] laws. We do not discriminate on the basis of race, color, national origin, age, disability sex, sexual orientation or gender identity.            Thank you!     Thank you for choosing [facility] [place]  for your care. Our goal is always to provide you with excellent care. Hearing back from our patients is one way we can continue to improve our services. Please take a few minutes to complete the written survey that you may receive in the mail after your visit with us. Thank you!             Your Updated Medication List - Protect others around you: Learn how to safely use, store and throw away your medicines at www.disposemymeds.org.          This list is accurate as of: 8/23/17  4:03 PM.  Always use your most recent med list.                   Brand Name Dispense Instructions for use Diagnosis    ACETAMINOPHEN PO      Take 500 mg by mouth        ADVIL PO      Take 400 mg by mouth every  8 hours as needed for moderate pain        BIOTIN 5000 5 MG Caps   Generic drug:  biotin           glipiZIDE 2.5 MG 24 hr tablet    GLUCOTROL XL     Take 2.5 mg by mouth daily        LORazepam 0.5 MG tablet    ATIVAN    12 tablet    Take 1 tablet (0.5 mg) by mouth every 4 hours as needed for anxiety or other (chest pain)    Chest pain       losartan 25 MG tablet    COZAAR    30 tablet    Take 1 tablet (25 mg) by mouth daily    Paroxysmal atrial fibrillation (H), CRISTIANA (obstructive sleep apnea), Benign essential hypertension       OXYBUTYNIN CHLORIDE PO      Take 5 mg by mouth 2 times daily Clarified as immediate release with [facility]./Patient        pravastatin 20 MG tablet    PRAVACHOL     Take 20 mg by mouth At Bedtime ON HOLD        ranitidine 150 MG capsule    ZANTAC     Take by mouth 2 times daily        triamcinolone 0.1 % cream    KENALOG     Apply topically 2 times daily        * WARFARIN SODIUM PO      Take 2.5 mg by mouth Take on Mon, Tues, Wed, Thurs, Fri, Sat (6 days a week)        * WARFARIN SODIUM PO      Take 5 mg by mouth on Sundays        * Notice:  This list has 2 medication(s) that are the same as other medications prescribed for you. Read the directions carefully, and ask your doctor or other care provider to review them with you.

## 2017-08-23 NOTE — LETTER
8/23/2017    Hilary Finney MD  [facility]   [address]  [place] [address]      RE: Savanna Rehman       Dear Colleague,    I had the pleasure of seeing Savanna Rehman in the [facility].    REASON FOR VISIT:  Followup visit.      Ms. Rehman is a very pleasant 76-year-old lady who comes in routine followup.  She has a history of diastolic dysfunction, chronic atrial fibrillation, morbid obesity, fibromyalgia, sleep apnea, type 2 diabetes and dyslipidemia.      There was an echocardiogram finding during a hospitalization in 12/2016 with elevated troponins and rapid ventricular response that suggested distal anteroseptal hypokinesis.  Cardiac catheterization showed no evidence of obstructive disease.  MRI was recommended, but she could not tolerate the scanner due to anxiety.  She underwent an echocardiogram in May of last year without regional wall motion abnormalities.      She has been doing reasonably well, though her major concern is potential depression, really.  She has no cardiorespiratory complaints of chest pain, shortness of breath, lightheadedness, presyncope, syncope, PND, orthopnea, palpitations or pedal edema.  It turns out that she at first described some lightheadedness which really on further questioning represents difficulty with equilibrium intermittently.      She tells me repeatedly that she does not have any thoughts of self-harm.  She is in fact trying to be proactive with respect to finding activities to do as well as reading.  She states that she has been a little bit noncompliant with her CPAP, however, would like to see a sleep specialist again to get back on track.      She says she has not had much trouble with swelling at all.  She did discontinue her losartan, hydrochlorothiazide and metoprolol as well as potassium on her own.      She does complain of general diffuse pain in her hips and shoulders, and we have not checked a CK  on her chronic statin medication.     Outpatient Encounter Prescriptions as of 8/23/2017   Medication Sig Dispense Refill     ACETAMINOPHEN PO Take 500 mg by mouth       losartan (COZAAR) 25 MG tablet Take 1 tablet (25 mg) by mouth daily 30 tablet 11     triamcinolone (KENALOG) 0.1 % cream Apply topically 2 times daily       biotin (BIOTIN 5000) 5 MG CAPS        ranitidine (ZANTAC) 150 MG capsule Take by mouth 2 times daily       pravastatin (PRAVACHOL) 20 MG tablet Take 20 mg by mouth At Bedtime ON HOLD   1     glipiZIDE (GLUCOTROL XL) 2.5 MG 24 hr tablet Take 2.5 mg by mouth daily  1     WARFARIN SODIUM PO Take 2.5 mg by mouth Take on Mon, Tues, Wed, Thurs, Fri, Sat (6 days a week)       WARFARIN SODIUM PO Take 5 mg by mouth on Sundays       Ibuprofen (ADVIL PO) Take 400 mg by mouth every 8 hours as needed for moderate pain       LORazepam (ATIVAN) 0.5 MG tablet Take 1 tablet (0.5 mg) by mouth every 4 hours as needed for anxiety or other (chest pain) (Patient taking differently: Take 0.5 mg by mouth as needed for anxiety or other (chest pain) ) 12 tablet 0     OXYBUTYNIN CHLORIDE PO Take 5 mg by mouth 2 times daily Clarified as immediate release with [facility]./Patient       [DISCONTINUED] metoprolol (TOPROL-XL) 25 MG 24 hr tablet Take 0.5 tablets (12.5 mg) by mouth daily At bedtime (Patient not taking: Reported on 8/23/2017) 30 tablet 11     [DISCONTINUED] hydrochlorothiazide (MICROZIDE) 12.5 MG capsule Take 1 capsule (12.5 mg) by mouth daily 30 capsule 11     Facility-Administered Encounter Medications as of 8/23/2017   Medication Dose Route Frequency Provider Last Rate Last Dose     nitroglycerin 100 MCG/ML injection               ASSESSMENT AND PLAN:  Pleasant 76-year-old lady with diastolic dysfunction and multiple comorbidities as outlined above.  She is in fact doing quite well on a good medical regimen.      I think at this point, we should resume an antihypertensive given her elevated blood pressures.  We  will resume the losartan.  Her heart rate is not rapid, and I would wonder if some of the imbalance sometimes with exertion could possibly be related to chronotropic incompetence.  We have 1 heart rate of 54 registered here on a prior visit.  I will go ahead and do a 48-hour Holter monitor and have her back with the results with the consideration of adding back the metoprolol at that time, certainly if we see rapid rates which are not manifest here.  Also, when she does check her blood pressure her heart rate is not rapid and she does not feel any rapid palpitations.      As mentioned, we will check the CK and contact her with the results.  Otherwise, we will see her back with the resumption of her losartan 1 tablet daily of 25 mg and check a CMP at that time just to ensure that she did not need the potassium replacement, other than for the thiazide use.      Total time is 30 minutes, 25 in coordination of care and counseling.     Again, thank you for allowing me to participate in the care of your patient.      Sincerely,    Dottie Geller MD     [facility]

## 2017-08-23 NOTE — PROGRESS NOTES
DIAGNOSES:      Encounter Diagnoses   Name Primary?     CRISTIANA (obstructive sleep apnea) Yes     Paroxysmal atrial fibrillation (H)      Benign essential hypertension          HPI:  See dictation [id number]        MEDICATIONS:    Current Outpatient Prescriptions   Medication Sig Dispense Refill     ACETAMINOPHEN PO Take 500 mg by mouth       losartan (COZAAR) 25 MG tablet Take 1 tablet (25 mg) by mouth daily 30 tablet 11     triamcinolone (KENALOG) 0.1 % cream Apply topically 2 times daily       biotin (BIOTIN 5000) 5 MG CAPS        ranitidine (ZANTAC) 150 MG capsule Take by mouth 2 times daily       pravastatin (PRAVACHOL) 20 MG tablet Take 20 mg by mouth At Bedtime ON HOLD   1     glipiZIDE (GLUCOTROL XL) 2.5 MG 24 hr tablet Take 2.5 mg by mouth daily  1     WARFARIN SODIUM PO Take 2.5 mg by mouth Take on Mon, Tues, Wed, Thurs, Fri, Sat (6 days a week)       WARFARIN SODIUM PO Take 5 mg by mouth on Sundays       Ibuprofen (ADVIL PO) Take 400 mg by mouth every 8 hours as needed for moderate pain       LORazepam (ATIVAN) 0.5 MG tablet Take 1 tablet (0.5 mg) by mouth every 4 hours as needed for anxiety or other (chest pain) (Patient taking differently: Take 0.5 mg by mouth as needed for anxiety or other (chest pain) ) 12 tablet 0     OXYBUTYNIN CHLORIDE PO Take 5 mg by mouth 2 times daily Clarified as immediate release with [facility]./Patient           ALLERGIES     Allergies   Allergen Reactions     Petroleum Jelly [Petrolatum] Anaphylaxis     Rash and swelling     Shellfish-Derived Products Anaphylaxis     Tongue swelling     Aspirin Swelling     tiongue swelling     Bacitracin      Rash swelling     Coumadin [Warfarin] Swelling     Leg swelling     Darvon [Propoxyphene] Swelling     Throat closes     Dilaudid [Hydromorphone]      Levaquin [Levofloxacin] Swelling     Tongue swelling     Neosporin [Neomycin-Polymyx-Gramicid] Swelling     rash     Oxycodone      Severe itching     Percodan [Oxycodone-Aspirin]      Severe  itching     Tramadol      Vicodin [Hydrocodone-Acetaminophen]      Severe itching       Xarelto [Rivaroxaban]      Adhesive Tape Rash     Band aids      Codeine Rash       PAST MEDICAL HISTORY:  Past Medical History:   Diagnosis Date     Anemia     Iron Deficiency anemia     Arrhythmia     Afib     Atrial fibrillation (H)      CAD (coronary artery disease)     non-obstructive     Congestive heart failure (H)      Degenerative disk disease      Fibromyalgia      Gastro-oesophageal reflux disease      Hiatal hernia      History of blood transfusion      Neuropathy (H)      Other chronic pain     neck, low back, legs     Pernicious anemia      Sleep apnea     uses CPAP.     Urinary incontinence      Vitamin D deficiency        PAST SURGICAL HISTORY:  Past Surgical History:   Procedure Laterality Date     APPENDECTOMY       C RAD RESEC TONSIL/PILLARS       CHOLECYSTECTOMY       COLONOSCOPY  3/15/2011     CORONARY ANGIOGRAPHY ADULT ORDER       HEART CATH LEFT HEART CATH  12/30/16    medication management     HYSTERECTOMY TOTAL ABDOMINAL       KNEE SURGERY Left      LAPAROSCOPIC NISSEN FUNDOPLICATION N/A 2/4/2015    Procedure: LAPAROSCOPIC NISSEN FUNDOPLICATION;  Surgeon: Armando Ansari MD;  Location:  OR     ORTHOPEDIC SURGERY      joint replacement     ORTHOPEDIC SURGERY Left     knee replacement     ORTHOPEDIC SURGERY      ulnar transposition     SOFT TISSUE SURGERY Left     lt elbow mass, ulnar transposition       FAMILY HISTORY:  Family History   Problem Relation Age of Onset     Unknown/Adopted No family hx of        SOCIAL HISTORY:  Social History     Social History     Marital status:      Spouse name: N/A     Number of children: N/A     Years of education: N/A     Social History Main Topics     Smoking status: Former Smoker     Types: Cigarettes     Quit date: 9/1/2014     Smokeless tobacco: None     Alcohol use Yes      Comment: socially.     Drug use: None     Sexual activity: Not Asked     Other  "Topics Concern     None     Social History Narrative       Review of Systems:  Skin:  Positive for itching   Eyes:  Positive for glasses  ENT:  Negative    Respiratory:  Positive for dyspnea on exertion;cough;sleep apnea  Cardiovascular:    lightheadedness;Positive for  Gastroenterology: Positive for diarrhea;nausea  Genitourinary:  Positive for urinary frequency  Musculoskeletal:  Positive for back pain;arthritis;joint pain;nocturnal cramping;foot pain  Neurologic:  Positive for numbness or tingling of feet;headaches  Psychiatric:  Positive for excessive stress  Heme/Lymph/Imm:  Positive for easy bruising  Endocrine:  Positive for diabetes    Physical Exam:  Vitals: BP (!) 138/91 (BP Location: Right arm, Patient Position: Sitting, Cuff Size: Adult Regular)  Pulse 73  Ht 1.727 m (5' 8\")  Wt 130.6 kg (288 lb)  SpO2 96%  BMI 43.79 kg/m2    Constitutional:  cooperative, alert and oriented, well developed, well nourished, in no acute distress morbidly obese      Skin:  warm and dry to the touch, no apparent skin lesions or masses noted        Head:  normocephalic, no masses or lesions        Eyes:  pupils equal and round;conjunctivae and lids unremarkable;sclera white;no xanthalasma        ENT:  no pallor or cyanosis        Neck:  carotid pulses are full and equal bilaterally   JVP not well visualized    Chest:  normal breath sounds, clear to auscultation, normal A-P diameter, normal symmetry, normal respiratory excursion, no use of accessory muscles        Cardiac: no murmurs, gallops or rubs detected irregularly irregular rhythm distant heart sounds              Abdomen:  abdomen soft;BS normoactive;non-tender obese      Vascular: not assessed this visit                                        Extremities and Back:  no deformities, clubbing, cyanosis, erythema observed   bilateral LE edema;pitting;R greater than L;trace          Neurological:  no gross motor deficits;affect appropriate, oriented to time, person " and place          ASSESSMENT AND PLAN:    See dictation    ORDERS AT TODAY'S VISIT:    Orders Placed This Encounter   Procedures     CK total     Comprehensive metabolic panel     Sleep Study Referral     Holter Monitor 48 hour - Adult           CC  Dottie Geller MD  [address]  [place] [id number]

## 2017-08-24 ENCOUNTER — DOCUMENTATION ONLY (OUTPATIENT)
Dept: LAB | Facility: CLINIC | Age: 77
End: 2017-08-24

## 2017-08-24 NOTE — PROGRESS NOTES
8/24 [facility] with [facility] at  [facility]. Pt is scheduled to see Dr Mims 9/7 at 1pm. Pt will arrive at 12:45 [facility] [address]. Pt was told to fill out all paper work & bring to the appt. nkf   8/24 Pt called me this morning stating she wondered if Dr FRIAS would agree to let her see another Sleep Physician. I told her I would check with Dr FRIAS nurse.  I spk with Dr Geller nurse Kely. Kely stated pt could schedule at  [facility] since [facility] doctor not avail until 9/29. nkf  8/23 I spoke with Jovana at [facility].  Jovana offered pt this Friday 8/25, but pt had another appt. She said the next avail would be 9/29 as their doctor is on vacation. Pt ended up making an appt w/Jovana for 9/29 with Dr Simeon. nkf 8/23

## 2017-08-24 NOTE — PROGRESS NOTES
REASON FOR VISIT:  Followup visit.      HISTORY OF PRESENT ILLNESS:  Ms. Rehman is a very pleasant 76-year-old lady who comes in routine followup.  She has a history of diastolic dysfunction, chronic atrial fibrillation, morbid obesity, fibromyalgia, sleep apnea, type 2 diabetes and dyslipidemia.      There was an echocardiogram finding during a hospitalization in 12/2016 with elevated troponins and rapid ventricular response that suggested distal anteroseptal hypokinesis.  Cardiac catheterization showed no evidence of obstructive disease.  MRI was recommended, but she could not tolerate the scanner due to anxiety.  She underwent an echocardiogram in May of last year without regional wall motion abnormalities.      She has been doing reasonably well, though her major concern is potential depression, really.  She has no cardiorespiratory complaints of chest pain, shortness of breath, lightheadedness, presyncope, syncope, PND, orthopnea, palpitations or pedal edema.  It turns out that she at first described some lightheadedness which really on further questioning represents difficulty with equilibrium intermittently.      She tells me repeatedly that she does not have any thoughts of self-harm.  She is in fact trying to be proactive with respect to finding activities to do as well as reading.  She states that she has been a little bit noncompliant with her CPAP, however, would like to see a sleep specialist again to get back on track.      She says she has not had much trouble with swelling at all.  She did discontinue her losartan, hydrochlorothiazide and metoprolol as well as potassium on her own.      She does complain of general diffuse pain in her hips and shoulders, and we have not checked a CK on her chronic statin medication.      ASSESSMENT AND PLAN:  Pleasant 76-year-old lady with diastolic dysfunction and multiple comorbidities as outlined above.  She is in fact doing quite well on a good medical regimen.       I think at this point, we should resume an antihypertensive given her elevated blood pressures.  We will resume the losartan.  Her heart rate is not rapid, and I would wonder if some of the imbalance sometimes with exertion could possibly be related to chronotropic incompetence.  We have 1 heart rate of 54 registered here on a prior visit.  I will go ahead and do a 48-hour Holter monitor and have her back with the results with the consideration of adding back the metoprolol at that time, certainly if we see rapid rates which are not manifest here.  Also, when she does check her blood pressure her heart rate is not rapid and she does not feel any rapid palpitations.      As mentioned, we will check the CK and contact her with the results.  Otherwise, we will see her back with the resumption of her losartan 1 tablet daily of 25 mg and check a CMP at that time just to ensure that she did not need the potassium replacement, other than for the thiazide use.      Total time is 30 minutes, 25 in coordination of care and counseling.      Arabella Geller MD      cc:   [place] [address]         ARABELLA GELLER MD             D: 2017 18:38   T: 2017 19:05   MT: CHIKI      Name:     KRIS SANDERSON   MRN:      [id number]        Account:      [id number]   :      1940           Service Date: 2017      Document: [id number]

## 2017-08-31 ENCOUNTER — CARE COORDINATION (OUTPATIENT)
Dept: CARDIOLOGY | Facility: CLINIC | Age: 77
End: 2017-08-31

## 2017-08-31 NOTE — PROGRESS NOTES
"Pt calls today with concerns for low BP and dizziness.      Pt had OV with Dr. Geller on 8/23/17, at that time Dr. Geller represcribed Losartan 25mg d/t HTN as pt had self discontinued medication previously.  Pts BP at OV was 138/91.    Pt states her home health RN checked her BP today with pts new home BP cuff and obtained a BP of 95/60.  RN then rechecked BP with RN's cuff and obtained a BP of 111/55.  Pt had not yet taken her medications for the day and in fact, after quite a lengthy conversation, pt admits that she actually did not start taking the Losartan as she states she forgot about it.  Today was going to be the first day for the patient taking losartan however, with the BP results, pt is now concerned about taking this medication and thinks she should not start it.  When discussing symptom of dizziness, pt states she has experienced dizziness/lightheadedness for some time.  The topic of lightheadedness was discussed at 8/23/17 OV with Dr. Geller, \" It turns out that she at first described some lightheadedness which really on further questioning represents difficulty with equilibrium intermittently.\"  Pt denies a change or worsening of lightheadedness since her OV with Dr. eGller.     Discussed hydration status with patient.  Patient admits to limiting water intake at times d/t fear of incontinence and to avoid the bother of urinary frequency and urgency.  Discussed the importance of hydration with patient who states understanding and agrees to focus on water intake throughout the day while at home.     Pt has f/u OV 9/19/17 with Maral Lora NP.      Will route message to Maral Lora in Dr. Geller' absence for review and recommendations regarding Losartan.    "

## 2017-08-31 NOTE — PROGRESS NOTES
She can hold for now.   Can we get a few more readings from the home health RN next week? If up higher then I will have her start 12.5 mg daily.     Thanks,     Maral Lora PA-C 8/31/2017 3:28 PM

## 2017-08-31 NOTE — PROGRESS NOTES
Called patient to discuss recommendations from Anah Lora.  No answer, left VM asking patient to return call.

## 2017-08-31 NOTE — PROGRESS NOTES
Pt returned call.  She is agreeable to plan to hold losartan for now.  Her home health RN will see her next week and check her BP and she will call us with the result to determine appropriate treatment.      Pt also states her daughter is an RN and will check her BP over the weekend.

## 2017-09-11 NOTE — PROGRESS NOTES
Contacted patient regarding fax from Home health RN with recent BP recordings.     Per fax, BP readings are as follows:     8/22//96  8/31/17-94/69  9/7//75    Discussed readings with patient who states that she is not interested in restarting the Losartan as it made her dizzy.  She will continue to hold the medication until her OV with Maral Lora on 9/22/17 and discuss it then.  She states she is not a pill person and is trying to get rid of pills not add more.     Encouraged patient to contact us with further concerns and to keep her OV appt on 9/22/17. Pt states understanding.

## 2017-10-17 ENCOUNTER — OFFICE VISIT (OUTPATIENT)
Dept: SLEEP MEDICINE | Facility: CLINIC | Age: 77
End: 2017-10-17
Payer: MEDICARE

## 2017-10-17 VITALS
BODY MASS INDEX: 43.63 KG/M2 | SYSTOLIC BLOOD PRESSURE: 138 MMHG | DIASTOLIC BLOOD PRESSURE: 75 MMHG | WEIGHT: 278 LBS | HEIGHT: 67 IN | HEART RATE: 65 BPM | RESPIRATION RATE: 16 BRPM | OXYGEN SATURATION: 95 %

## 2017-10-17 DIAGNOSIS — G47.33 OSA (OBSTRUCTIVE SLEEP APNEA): ICD-10-CM

## 2017-10-17 DIAGNOSIS — I48.20 CHRONIC ATRIAL FIBRILLATION (H): Primary | ICD-10-CM

## 2017-10-17 PROCEDURE — 99205 OFFICE O/P NEW HI 60 MIN: CPT | Performed by: FAMILY MEDICINE

## 2017-10-17 RX ORDER — ZOLPIDEM TARTRATE 5 MG/1
TABLET ORAL
Qty: 1 TABLET | Refills: 0 | Status: SHIPPED | OUTPATIENT
Start: 2017-10-17 | End: 2017-11-10

## 2017-10-17 NOTE — MR AVS SNAPSHOT
After Visit Summary   10/17/2017    Savanna Rehman    MRN: [id number]           Patient Information     Date Of Birth          1940        Visit Information        Provider Department      10/17/2017 2:30 PM Warner Mims MD [facility]        Today's Diagnoses     Chronic atrial fibrillation (H)    -  1    CRISTIANA (obstructive sleep apnea)        Class 3 obesity due to excess calories with serious comorbidity and body mass index (BMI) of 40.0 to 44.9 in adult (H)          Care Instructions            Your BMI is Body mass index is 43.54 kg/(m^2).  Weight management is a personal decision.  If you are interested in exploring weight loss strategies, the following discussion covers the approaches that may be successful. Body mass index (BMI) is one way to tell whether you are at a healthy weight, overweight, or obese. It measures your weight in relation to your height.  A BMI of 18.5 to 24.9 is in the healthy range. A person with a BMI of 25 to 29.9 is considered overweight, and someone with a BMI of 30 or greater is considered obese. More than two-thirds of American adults are considered overweight or obese.  Being overweight or obese increases the risk for further weight gain. Excess weight may lead to heart disease and diabetes.  Creating and following plans for healthy eating and physical activity may help you improve your health.  Weight control is part of healthy lifestyle and includes exercise, emotional health, and healthy eating habits. Careful eating habits lifelong are the mainstay of weight control. Though there are significant health benefits from weight loss, long-term weight loss with diet alone may be very difficult to achieve- studies show long-term success with dietary management in less than 10% of people. Attaining a healthy weight may be especially difficult to achieve in those with severe obesity. In some cases, medications, devices and surgical  management might be considered.  What can you do?  If you are overweight or obese and are interested in methods for weight loss, you should discuss this with your provider.     Consider reducing daily calorie intake by 500 calories.     Keep a food journal.     Avoiding skipping meals, consider cutting portions instead.    Diet combined with exercise helps maintain muscle while optimizing fat loss. Strength training is particularly important for building and maintaining muscle mass. Exercise helps reduce stress, increase energy, and improves fitness. Increasing exercise without diet control, however, may not burn enough calories to loose weight.       Start walking three days a week 10-20 minutes at a time    Work towards walking thirty minutes five days a week     Eventually, increase the speed of your walking for 1-2 minutes at time    In addition, we recommend that you review healthy lifestyles and methods for weight loss available through the National Institutes of Health patient information sites:  http://win.niddk.nih.gov/publications/index.htm    And look into health and wellness programs that may be available through your health insurance provider, employer, local community center, or [employer].    Weight management plan: Patient was referred to their PCP to discuss a diet and exercise plan.     Your blood pressure was checked while you were in clinic today.  Please read the guidelines below about what these numbers mean and what you should do about them.  Your systolic blood pressure is the top number.  This is the pressure when the heart is pumping.  Your diastolic blood pressure is the bottom number.  This is the pressure in between beats.  If your systolic blood pressure is less than 120 and your diastolic blood pressure is less than 80, then your blood pressure is normal. There is nothing more that you need to do about it  If your systolic blood pressure is 120-139 or your diastolic blood pressure is  "80-89, your blood pressure may be higher than it should be.  You should have your blood pressure re-checked within a year by a primary care provider.  If your systolic blood pressure is 140 or greater or your diastolic blood pressure is 90 or greater, you may have high blood pressure.  High blood pressure is treatable, but if left untreated over time it can put you at risk for heart attack, stroke, or kidney failure.  You should have your blood pressure re-checked by a primary care provider within the next four weeks.      MY TREATMENT INFORMATION FOR SLEEP APNEA -  Savanna Rehman    DOCTOR: Warner Mims MD, MPH  SLEEP CENTER : [facility]         If I haven't had a sleep study yet, what can I expect?  A personal story from [employer]  https://www.[employer].com/watch?v=AxPLmlRpnCs      Am I having a home sleep study?  Here is a video in case you get home and want to make sure you have done it correctly  https://www.[employer].com/watch?v=RTQ2R7uRxu4&feature=youtu.be      Frequently asked questions:  1. What is Obstructive Sleep Apnea (CRISTIANA)? CRISTIANA is the most common type of sleep apnea. Apnea literally means, \"without breath.\" It is characterized by repetitive pauses in breathing, despite continued effort to breathe, and is usually associated with a reduction in blood oxygen saturation. Apneas can last 10 to over 60 seconds. It is caused by narrowing or collapse of the upper airway as muscles relax during sleep. Severity of sleep apnea is determined by frequency of breathing events and their effect on your sleep and oxygen levels determined during sleep testing.     2. What are the consequences of CRISTIANA? Symptoms include: daytime sleepiness- possibly increasing the risk of falling asleep while driving, unrefreshing/restless sleep, snoring, insomnia, waking frequently to urinate, waking with heartburn or reflux, reduced concentration and memory, and morning headaches. Other health consequences may include " development of high blood pressure and other cardiovascular disease in persons who are susceptible. Untreated CRISTIANA  can contribute to heart disease, stroke and diabetes.     3. What are the treatment options? In most situations, sleep apnea is a lifelong disease that must be managed with daily therapy. Medications are not effective for sleep apnea and surgery is generally not performed until other therapies have been tried. Therapy is usually tailored to the individual patient based on many factors including your wishes as well as severity of sleep apnea and severity of obesity. Continuous Positive Airway (CPAP) is the most reliable treatment. An oral device to hold your jaw forward is usually the next most reliable option. Other options include postioning devices (to keep you off your back), weight loss, and surgery including a tongue pacing device. There is more detail about some of these options below.            1. CPAP-  WHAT DOES IT DO AND HOW CAN I LEARN TO WEAR IT?                               BEFORE I START, CAN I WATCH A MOVIE TO GET A PLAN ON HOW TO USE CPAP?  https://www.[employer].com/watch?l=n8O13cp002X      Continuous positive airway pressure, or CPAP, is the most effective treatment for obstructive sleep apnea. It works by blowing room air, through a mask, to hold your throat open. A decision to use CPAP is a major step forward in the pursuit of a healthier life. The successful use of CPAP will help you breathe easier, sleep better and live healthier. You can choose CPAP equipment from any durable medical equipment provider that meets your needs.  Using CPAP can be a positive experience if you keep these brown points in mind:  1. Commitment  CPAP is not a quick fix for your problem. It involves a long-term commitment to improve your sleep and your health.    2. Communication  Stay in close communication with both your sleep doctor and your CPAP supplier. Ask lots of questions and seek help when you need  "it.    3. Consistency  Use CPAP all night, every night and for every nap. You will receive the maximum health benefits from CPAP when you use it every time that you sleep. This will also make it easier for your body to adjust to the treatment.    4. Correction  The first machine and mask that you try may not be the best ones for you. Work with your sleep doctor and your CPAP supplier to make corrections to your equipment selection. Ask about trying a different type of machine or mask if you have ongoing problems. Make sure that your mask is a good fit and learn to use your equipment properly.    5. Challenge  Tell a family member or close friend to ask you each morning if you used your CPAP the previous night. Have someone to challenge you to give it your best effort.    6. Connection   Your adjustment to CPAP will be easier if you are able to connect with others who use the same treatment. Ask your sleep doctor if there is a support group in your area for people who have sleep apnea, or look for one on the Internet.  7. Comfort   Increase your level of comfort by using a saline spray, decongestant or heated humidifier if CPAP irritates your nose, mouth or throat. Use your unit's \"ramp\" setting to slowly get used to the air pressure level. There may be soft pads you can buy that will fit over your mask straps. Look on www.CPAP.com for accessories that can help make CPAP use more comfortable.  8. Cleaning   Clean your mask, tubing and headgear on a regular basis. Put this time in your schedule so that you don't forget to do it. Check and replace the filters for your CPAP unit and humidifier.    9. Completion   Although you are never finished with CPAP therapy, you should reward yourself by celebrating the completion of your first month of treatment. Expect this first month to be your hardest period of adjustment. It will involve some trial and error as you find the machine, mask and pressure settings that are right " for you.    10. Continuation  After your first month of treatment, continue to make a daily commitment to use your CPAP all night, every night and for every nap.    CPAP-Tips to starting with success:  Begin using your CPAP for short periods of time during the day while you watch TV or read.    Use CPAP every night and for every nap. Using it less often reduces the health benefits and makes it harder for your body to get used to it.    Make small adjustments to your mask, tubing, straps and headgear until you get the right fit. Tightening the mask may actually worsen the leak.  If it leaves significant marks on your face or irritates the bridge of your nose, it may not be the best mask for you.  Speak with the person who supplied the mask and consider trying other masks. Insurances will allow you to try different masks during the first month of starting CPAP.  Insurance also covers a new mask, hose and filter about every 6 months.    Use a saline nasal spray to ease mild nasal congestion. Neti-Pot or saline nasal rinses may also help. Nasal gel sprays can help reduce nasal dryness.  Biotene mouthwash can be helpful to protect your teeth if you experience frequent dry mouth.  Dry mouth may be a sign of air escaping out of your mouth or out of the mask in the case of a full face mask.  Speak with your provider if you expect that is the case.     Take a nasal decongestant to relieve more severe nasal or sinus congestion.  Do not use Afrin (oxymetazoline) nasal spray more than 3 days in a row.  Speak with your sleep doctor if your nasal congestion is chronic.    Use a heated humidifier that fits your CPAP model to enhance your breathing comfort. Adjust the heat setting up if you get a dry nose or throat, down if you get condensation in the hose or mask.  Position the CPAP lower than you so that any condensation in the hose drains back into the machine rather than towards the mask.    Try a system that uses nasal pillows  if traditional masks give you problems.    Clean your mask, tubing and headgear once a week. Make sure the equipment dries fully.    Regularly check and replace the filters for your CPAP unit and humidifier.    Work closely with your sleep provider and your CPAP supplier to make sure that you have the machine, mask and air pressure setting that works best for you. It is better to stop using it and call your provider to solve problems than to lay awake all night frustrated with the device.      BESIDES CPAP, WHAT OTHER THERAPIES ARE THERE?        Positioning Device  Positioning devices are generally used when sleep apnea is mild and only occurs on your back.This example shows a pillow that straps around the waist. It may be appropriate for those whose sleep study shows milder sleep apnea that occurs primarily when lying flat on one's back. Preliminary studies have shown benefit but effectiveness at home may need to be verified by a home sleep test. These devices are generally not covered by medical insurance.                        Oral Appliance  What is oral appliance therapy?  An oral appliance is a small acrylic device that fits over the upper and lower teeth or tongue (similar to an orthodontic retainer or a mouth guard). This device slightly advances the lower jaw or tongue, which moves the base of the tongue forward, opens the airway, improves breathing and can effectively treat snoring and obstructive sleep apnea sleep apnea. The appliance is fabricated and customized by a qualified dentist with experience in treating snoring and sleep apnea. Oral appliances are usually well tolerated and have relatively high compliance by patients1, 2, 3.  When is an oral appliance indicated?  Oral appliance therapy is recommended as a first-line treatment for patients with primary snoring, mild sleep apnea, and for patients with moderate sleep apnea who prefer appliance therapy to use of CPAP4, 5. Severity of sleep apnea is  determined by sleep testing and is based on the number of respiratory events per hour of sleep.   How successful is oral appliance therapy?  The success rate of oral appliance therapy in patients with mild sleep apnea is 75-80% while in patients with moderate sleep apnea it is 50-70%. The chance of success in patients with severe sleep apnea is 40-50%. The research also shows that oral appliances have a beneficial effect on the cardiovascular health of CRISTIANA patients at the same magnitude as CPAP therapy7.  Oral appliances should be a second-line treatment in cases of severe sleep apnea, but if not completely successful then a combination therapy utilizing CPAP plus oral appliance therapy may be effective. Oral appliances tend to be effective in a broad range of patients although studies show that the patients who have the highest success are females, younger patients, those with milder disease, and less severe obesity. 3, 6.   The chances of success are lower in patients who have more severe CRISTIANA, are older, and those who are morbidly obese.     Example of an oral appliance   Finding a dentist that practices dental sleep medicine  Specific training is available through the American Academy of Dental Sleep Medicine for dentists interested in working in the field of sleep. To find a dentist who is educated in the field of sleep and the use of oral appliances, near you, visit the Web site of the American Academy of Dental Sleep Medicine; also see   http://www.accpstorage.org/newOrganization/patients/oralAppliances.pdf  To search for a dentist certified in these practices:  Http://aadsm.org/FindADentist.aspx?1  1. Soraya, et al. Objectively measured vs self-reported compliance during oral appliance therapy for sleep-disordered breathing. Chest 2013; 144(5): 4645-3489.  2. Lewis et al. Objective measurement of compliance during oral appliance therapy for sleep-disordered breathing. Thorax 2013; 68(1): 91-96.  3.  Desi et al. Mandibular advancement devices in 620 men and women with CRISTIANA and snoring: tolerability and predictors of treatment success. Chest 2004; 125: 1618-6567.  4. Judy et al. Oral appliances for snoring and CRISTIANA: a review. Sleep 2006; 29: 244-262.  5. Marques et al. Oral appliance treatment for CRISTIANA: an update. J Clin Sleep Med 2014; 10(2): 215-227.  6. Amber et al. Predictors of OSAH treatment outcome. J Dent Res 2007; 86: 1671-5956.      Weight Loss:    Weight management is a personal decision.  If you are interested in exploring weight loss strategies, the following discussion covers the impact on weight loss on sleep apnea and the approaches that may be successful.    Weight loss decreases severity of sleep apnea in most people with obesity. For those with mild obesity who have developed snoring with weight gain, even 15-30 pound weight loss can improve and occasionally eliminate sleep apnea.  Structured and life-long dietary and health habits are necessary to lose weight and keep healthier weight levels.     Though there may be significant health benefits from weight loss, long-term weight loss is very difficult to achieve- studies show success with dietary management in less than 10% of people. In addition, substantial weight loss may require years of dietary control and may be difficult if patients have severe obesity. In these cases, surgical management may be considered.  Finally, older individuals who have tolerated obesity without health complications may be less likely to benefit from weight loss strategies.    Your BMI is Body mass index is 43.54 kg/(m^2).  Body mass index (BMI) is one way to tell whether you are at a healthy weight, overweight, or obese. It measures your weight in relation to your height.  A BMI of 18.5 to 24.9 is in the healthy range. A person with a BMI of 25 to 29.9 is considered overweight, and someone with a BMI of 30 or greater is considered obese. More than  two-thirds of American adults are considered overweight or obese.  Being overweight or obese increases the risk for further weight gain. Excess weight may lead to heart disease and diabetes.  Creating and following plans for healthy eating and physical activity may help you improve your health.  Weight control is part of healthy lifestyle and includes exercise, emotional health, and healthy eating habits. Careful eating habits lifelong are the mainstay of weight control. Though there are significant health benefits from weight loss, long-term weight loss with diet alone may be very difficult to achieve- studies show long-term success with dietary management in less than 10% of people. Attaining a healthy weight may be especially difficult to achieve in those with severe obesity. In some cases, medications, devices and surgical management might be considered.  What can you do?  If you are overweight or obese and are interested in methods for weight loss, you should discuss this with your provider.     Consider reducing daily calorie intake by 500 calories.     Keep a food journal.     Avoiding skipping meals, consider cutting portions instead.    Diet combined with exercise helps maintain muscle while optimizing fat loss. Strength training is particularly important for building and maintaining muscle mass. Exercise helps reduce stress, increase energy, and improves fitness. Increasing exercise without diet control, however, may not burn enough calories to loose weight.       Start walking three days a week 10-20 minutes at a time    Work towards walking thirty minutes five days a week     Eventually, increase the speed of your walking for 1-2 minutes at time    In addition, we recommend that you review healthy lifestyles and methods for weight loss available through the National Institutes of Health patient information sites:  http://win.niddk.nih.gov/publications/index.htm    And look into health and wellness  programs that may be available through your health insurance provider, employer, local [facility] center, or [employer].    Surgery:    Upper Airway Surgery for CRISTIANA  Surgery for CRISTIANA is a second-line treatment option in the management of sleep apnea.  Surgery should be considered for patients who are having a difficult time tolerating CPAP.    Surgery for CRISTIANA is directed at areas that are responsible for narrowing or complete obstruction of the airway during sleep.  There are a wide range of procedures available to enlarge and/or stabilize the airway to prevent blockage of breathing in the three major areas where it can occur: the palate, tongue, and nasal regions.  Successful surgical treatment depends on the accurate identification of the factors responsible for obstructive sleep apnea in each person.  A personalized approach is required because there is no single treatment that works well for everyone.  Because of anatomic variation, consultation with an examination by a sleep surgeon is a critical first step in determining what surgical options are best for each patient.  In some cases, examination during sedation may be recommended in order to guide the selection of procedures.  Patients will be counseled about risks and benefits as well as the typical recovery course after surgery. Surgery is typically not a cure for a person s CRISTIANA.  However, surgery will often significantly improve one s CRISTIANA severity (termed  success rate ).  Even in the absence of a cure, surgery will decrease the cardiovascular risk associated with OSA7; improve overall quality of life8 (sleepiness, functionality, sleep quality, etc).          Palate Procedures:  Patients with CRISTIANA often have narrowing of their airway in the region of their tonsils and uvula.  The goals of palate procedures are to widen the airway in this region as well as to help the tissues resist collapse.  Modern palate procedure techniques focus on tissue conservation and  soft tissue rearrangement, rather than tissue removal.  Often the uvula is preserved in this procedure. Residual sleep apnea is common in patient after pharyngoplasty with an average reduction in sleep apnea events of 33%2.      Tongue Procedures:  While patients are awake, the muscles that surround the throat are active and keep this region open for breathing. These muscles relax during sleep, allowing the tongue and other structures to collapse and block breathing.  There are several different tongue procedures available.  Selection of a tongue base procedure depends on characteristics seen on physical exam.  Generally, procedures are aimed at removing bulky tissues in this area or preventing the back of the tongue from falling back during sleep.  Success rates for tongue surgery range from 50-62%3.    Hypoglossal Nerve Stimulation:  Hypoglossal nerve stimulation has recently received approval from the [place] Food and Drug Administration for the treatment of obstructive sleep apnea.  This is based on research showing that the system was safe and effective in treating sleep apnea6.  Results showed that the median AHI score decreased 68%, from 29.3 to 9.0. This therapy uses an implant system that senses breathing patterns and delivers mild stimulation to airway muscles, which keeps the airway open during sleep.  The system consists of three fully implanted components: a small generator (similar in size to a pacemaker), a breathing sensor, and a stimulation lead.  Using a small handheld remote, a patient turns the therapy on before bed and off upon awakening.    Candidates for this device must be greater than 22 years of age, have moderate to severe CRISTIANA (AHI between 20-65), BMI less than 32, have tried CPAP/oral appliance without success, and have appropriate upper airway anatomy (determined by a sleep endoscopy performed by Dr. Guzman).    Hypoglossal Nerve Stimulation Pathway:    The sleep surgeon s office  will work with the patient through the insurance prior-authorization process (including communications and appeals).    Nasal Procedures:  Nasal obstruction can interfere with nasal breathing during the day and night.  Studies have shown that relief of nasal obstruction can improve the ability of some patients to tolerate positive airway pressure therapy for obstructive sleep apnea1.  Treatment options include medications such as nasal saline, topical corticosteroid and antihistamine sprays, and oral medications such as antihistamines or decongestants. Non-surgical treatments can include external nasal dilators for selected patients. If these are not successful by themselves, surgery can improve the nasal airway either alone or in combination with these other options.    Combination Procedures:  Combination of surgical procedures and other treatments may be recommended, particularly if patients have more than one area of narrowing or persistent positional disease.  The success rate of combination surgery ranges from 66-80%2,3.      1. Marlene HURST. The Role of the Nose in Snoring and Obstructive Sleep Apnoea: An Update.  Eur Arch Otorhinolaryngol. 2011; 268: 1365-73.  2.  Sheron SM; Eddi JA; Bao JR; Pallanch JF; Abby MB; Carolina SG; Natalie DUNN. Surgical modifications of the upper airway for obstructive sleep apnea in adults: a systematic review and meta-analysis. SLEEP 2010;33(10):1467-5738. Davi LAIRD. Hypopharyngeal surgery in obstructive sleep apnea: an evidence-based medicine review.  Arch Otolaryngol Head Neck Surg. 2006 Feb;132(2):206-13.  3. Israel YH1, Rosalie Y, Howard LUCIANO. The efficacy of anatomically based multilevel surgery for obstructive sleep apnea. Otolaryngol Head Neck Surg. 2003 Oct;129(4):327-35.  4. Davi LAIRD, Goldberg A. Hypopharyngeal Surgery in Obstructive Sleep Apnea: An Evidence-Based Medicine Review. Arch Otolaryngol Head Neck Surg. 2006 Feb;132(2):206-13.  5. Monica IVORY et al. Upper-Airway  Stimulation for Obstructive Sleep Apnea.  N Engl J Med. 2014 Jan 9;370(2):139-49.  6. Flaco Y et al. Increased Incidence of Cardiovascular Disease in Middle-aged Men with Obstructive Sleep Apnea. Am J Respir Crit Care Med; 2002 166: 159-165  7. Jacquelin LEMUS et al. Studying Life Effects and Effectiveness of Palatopharyngoplasty (SLEEP) study: Subjective Outcomes of Isolated Uvulopalatopharyngoplasty. Otolaryngol Head Neck Surg. 2011; 144: 623-631.  Please, schedule sleep study and follow up visit with the nurse (she will coming into the room and meet with you). Use sleeping aid only if needed during the night of the study.    Thank you!    Warner Mims MD, MPH  Clinical Sleep and Occupational / Environmental Medicine              Follow-ups after your visit        Your next 10 appointments already scheduled     Oct 30, 2017  2:30 PM CDT   Holter Monitor with [facility] DEVICE [facility] ([facility])    [address]  [facility] [id number] [phone]           LOCATION - [address], [address] [place] [address] **Please check-in at the [place] Registration Office, [address], in the [facility] building. When you are finished registering, please go to [address] and have a seat.            Oct 30, 2017  3:00 PM CDT   LAB with RU LAB   [facility] PHYSICIANS [facility] AT [place] ([facility])    [address]  [facility] [id number] [phone]           Patient must bring picture ID. Patient should be prepared to give a urine specimen  Please do not eat 10-12 hours before your appointment if you are coming in fasting for labs on lipids, cholesterol, or glucose (sugar). Pregnant women should follow their Care Team instructions. Water with medications is okay. Do not drink coffee or other fluids. If you have concerns about taking  your medications, please ask at office or if scheduling via  "[facility], send a message by clicking on Secure Messaging, Message Your Care Team.            Nov 10, 2017  2:45 PM CST   Return Visit with Dottie Geller MD   [facility] AT [place] ([facility])    [address] [address] [id number] [phone]              Future tests that were ordered for you today     Open Future Orders        Priority Expected Expires Ordered    Comprehensive Sleep Study Routine  4/15/2018 10/17/2017    ABG-Blood Gas Arterial ([place] / [place]) Routine  12/16/2017 10/17/2017            Who to contact     If you have questions or need follow up information about today's clinic visit or your schedule please contact [facility] directly at [phone].  Normal or non-critical lab and imaging results will be communicated to you by [employer], letter or phone within 4 business days after the clinic has received the results. If you do not hear from us within 7 days, please contact the clinic through [employer] or phone. If you have a critical or abnormal lab result, we will notify you by phone as soon as possible.  Submit refill requests through [facility] or call your pharmacy and they will forward the refill request to us. Please allow 3 business days for your refill to be completed.          Additional Information About Your Visit        [facility] Information     [facility] lets you send messages to your doctor, view your test results, renew your prescriptions, schedule appointments and more. To sign up, go to [email or web address] . Click on \"Log in\" on the left side of the screen, which will take you to the Welcome page. Then click on \"Sign up Now\" on the right side of the page.     You will be asked to enter the access code listed below, as well as some personal information. Please follow the directions to create your username and password.     Your access code is: [email or web address]  Expires: 11/21/2017  3:26 " "PM     Your access code will  in 90 days. If you need help or a new code, please call your [place] clinic or [phone].        Care EveryWhere ID     This is your Care EveryWhere ID. This could be used by other organizations to access your [place] medical records  [id number]        Your Vitals Were     Pulse Respirations Height Pulse Oximetry BMI (Body Mass Index)       65 16 1.702 m (5' 7\") 95% 43.54 kg/m2        Blood Pressure from Last 3 Encounters:   10/17/17 138/75   17 (!) 138/91   17 120/80    Weight from Last 3 Encounters:   10/17/17 126.1 kg (278 lb)   17 130.6 kg (288 lb)   17 130.6 kg (288 lb)              We Performed the Following     Sleep Study Referral          Today's Medication Changes          These changes are accurate as of: 10/17/17  3:36 PM.  If you have any questions, ask your nurse or doctor.               Start taking these medicines.        Dose/Directions    zolpidem 5 MG tablet   Commonly known as:  AMBIEN   Used for:  CRISTIANA (obstructive sleep apnea), Class 3 obesity due to excess calories with serious comorbidity and body mass index (BMI) of 40.0 to 44.9 in adult (H), Chronic atrial fibrillation (H)        Take tablet by mouth 15 minutes prior to sleep, for Sleep Study   Quantity:  1 tablet   Refills:  0         These medicines have changed or have updated prescriptions.        Dose/Directions    LORazepam 0.5 MG tablet   Commonly known as:  ATIVAN   This may have changed:  when to take this   Used for:  Chest pain        Dose:  0.5 mg   Take 1 tablet (0.5 mg) by mouth every 4 hours as needed for anxiety or other (chest pain)   Quantity:  12 tablet   Refills:  0            Where to get your medicines      Some of these will need a paper prescription and others can be bought over the counter.  Ask your nurse if you have questions.     Bring a paper prescription for each of these medications     zolpidem 5 MG tablet                Primary Care Provider " Office Phone # Fax #    Hilary Lucy Finney [phone]       [facility] [address]  [facility] [address]        Equal Access to Services     LYUDMILATYRA DANELLE : Jatinder naif madden tami Read, wamorganda luqbrando, qaybta kaalmada coty, hanny dickinson laOttonielavril patton. So [facility] [phone].    ATENCIÓN: Si habla español, tiene a maier disposición servicios gratuitos de asistencia lingüística. Michelleame al [phone].    We comply with applicable federal civil rights laws and [place] laws. We do not discriminate on the basis of race, color, national origin, age, disability, sex, sexual orientation, or gender identity.            Thank you!     Thank you for choosing [facility]  for your care. Our goal is always to provide you with excellent care. Hearing back from our patients is one way we can continue to improve our services. Please take a few minutes to complete the written survey that you may receive in the mail after your visit with us. Thank you!             Your Updated Medication List - Protect others around you: Learn how to safely use, store and throw away your medicines at www.disposemymeds.org.          This list is accurate as of: 10/17/17  3:36 PM.  Always use your most recent med list.                   Brand Name Dispense Instructions for use Diagnosis    ACETAMINOPHEN PO      Take 500 mg by mouth        ADVIL PO      Take 400 mg by mouth every 8 hours as needed for moderate pain        BIOTIN 5000 5 MG Caps   Generic drug:  biotin           glipiZIDE 2.5 MG 24 hr tablet    GLUCOTROL XL     Take 2.5 mg by mouth daily        LORazepam 0.5 MG tablet    ATIVAN    12 tablet    Take 1 tablet (0.5 mg) by mouth every 4 hours as needed for anxiety or other (chest pain)    Chest pain       losartan 25 MG tablet    COZAAR    30 tablet    Take 1 tablet (25 mg) by mouth daily    Paroxysmal atrial fibrillation (H), CRISTIANA (obstructive sleep apnea), Benign  essential hypertension       OXYBUTYNIN CHLORIDE PO      Take 5 mg by mouth 2 times daily Clarified as immediate release with [facility]./Patient        pravastatin 20 MG tablet    PRAVACHOL     Take 20 mg by mouth At Bedtime ON HOLD        ranitidine 150 MG capsule    ZANTAC     Take by mouth 2 times daily        triamcinolone 0.1 % cream    KENALOG     Apply topically 2 times daily        * WARFARIN SODIUM PO      Take 2.5 mg by mouth Take on Mon, Tues, Wed, Thurs, Fri, Sat (6 days a week)        * WARFARIN SODIUM PO      Take 5 mg by mouth on Sundays        zolpidem 5 MG tablet    AMBIEN    1 tablet    Take tablet by mouth 15 minutes prior to sleep, for Sleep Study    CRISTIANA (obstructive sleep apnea), Class 3 obesity due to excess calories with serious comorbidity and body mass index (BMI) of 40.0 to 44.9 in adult (H), Chronic atrial fibrillation (H)       * Notice:  This list has 2 medication(s) that are the same as other medications prescribed for you. Read the directions carefully, and ask your doctor or other care provider to review them with you.

## 2017-10-17 NOTE — PATIENT INSTRUCTIONS
Your BMI is Body mass index is 43.54 kg/(m^2).  Weight management is a personal decision.  If you are interested in exploring weight loss strategies, the following discussion covers the approaches that may be successful. Body mass index (BMI) is one way to tell whether you are at a healthy weight, overweight, or obese. It measures your weight in relation to your height.  A BMI of 18.5 to 24.9 is in the healthy range. A person with a BMI of 25 to 29.9 is considered overweight, and someone with a BMI of 30 or greater is considered obese. More than two-thirds of American adults are considered overweight or obese.  Being overweight or obese increases the risk for further weight gain. Excess weight may lead to heart disease and diabetes.  Creating and following plans for healthy eating and physical activity may help you improve your health.  Weight control is part of healthy lifestyle and includes exercise, emotional health, and healthy eating habits. Careful eating habits lifelong are the mainstay of weight control. Though there are significant health benefits from weight loss, long-term weight loss with diet alone may be very difficult to achieve- studies show long-term success with dietary management in less than 10% of people. Attaining a healthy weight may be especially difficult to achieve in those with severe obesity. In some cases, medications, devices and surgical management might be considered.  What can you do?  If you are overweight or obese and are interested in methods for weight loss, you should discuss this with your provider.     Consider reducing daily calorie intake by 500 calories.     Keep a food journal.     Avoiding skipping meals, consider cutting portions instead.    Diet combined with exercise helps maintain muscle while optimizing fat loss. Strength training is particularly important for building and maintaining muscle mass. Exercise helps reduce stress, increase energy, and improves  fitness. Increasing exercise without diet control, however, may not burn enough calories to loose weight.       Start walking three days a week 10-20 minutes at a time    Work towards walking thirty minutes five days a week     Eventually, increase the speed of your walking for 1-2 minutes at time    In addition, we recommend that you review healthy lifestyles and methods for weight loss available through the National Institutes of Health patient information sites:  http://win.niddk.nih.gov/publications/index.htm    And look into health and wellness programs that may be available through your health insurance provider, employer, local community center, or kanchan club.    Weight management plan: Patient was referred to their PCP to discuss a diet and exercise plan.     Your blood pressure was checked while you were in clinic today.  Please read the guidelines below about what these numbers mean and what you should do about them.  Your systolic blood pressure is the top number.  This is the pressure when the heart is pumping.  Your diastolic blood pressure is the bottom number.  This is the pressure in between beats.  If your systolic blood pressure is less than 120 and your diastolic blood pressure is less than 80, then your blood pressure is normal. There is nothing more that you need to do about it  If your systolic blood pressure is 120-139 or your diastolic blood pressure is 80-89, your blood pressure may be higher than it should be.  You should have your blood pressure re-checked within a year by a primary care provider.  If your systolic blood pressure is 140 or greater or your diastolic blood pressure is 90 or greater, you may have high blood pressure.  High blood pressure is treatable, but if left untreated over time it can put you at risk for heart attack, stroke, or kidney failure.  You should have your blood pressure re-checked by a primary care provider within the next four weeks.      MY TREATMENT  "INFORMATION FOR SLEEP APNEA -  Savanna Rehman    DOCTOR: Warner Mims MD, MPH  SLEEP CENTER : [facility]         If I haven't had a sleep study yet, what can I expect?  A personal story from [employer]  https://www.buySAFE.com/watch?v=AxPLmlRpnCs      Am I having a home sleep study?  Here is a video in case you get home and want to make sure you have done it correctly  https://www.[employer].com/watch?v=TTC2J9gVkd3&feature=youtu.be      Frequently asked questions:  1. What is Obstructive Sleep Apnea (CRISTIANA)? CRISTIANA is the most common type of sleep apnea. Apnea literally means, \"without breath.\" It is characterized by repetitive pauses in breathing, despite continued effort to breathe, and is usually associated with a reduction in blood oxygen saturation. Apneas can last 10 to over 60 seconds. It is caused by narrowing or collapse of the upper airway as muscles relax during sleep. Severity of sleep apnea is determined by frequency of breathing events and their effect on your sleep and oxygen levels determined during sleep testing.     2. What are the consequences of CRISTIANA? Symptoms include: daytime sleepiness- possibly increasing the risk of falling asleep while driving, unrefreshing/restless sleep, snoring, insomnia, waking frequently to urinate, waking with heartburn or reflux, reduced concentration and memory, and morning headaches. Other health consequences may include development of high blood pressure and other cardiovascular disease in persons who are susceptible. Untreated CRISTIANA  can contribute to heart disease, stroke and diabetes.     3. What are the treatment options? In most situations, sleep apnea is a lifelong disease that must be managed with daily therapy. Medications are not effective for sleep apnea and surgery is generally not performed until other therapies have been tried. Therapy is usually tailored to the individual patient based on many factors including your wishes as well as " severity of sleep apnea and severity of obesity. Continuous Positive Airway (CPAP) is the most reliable treatment. An oral device to hold your jaw forward is usually the next most reliable option. Other options include postioning devices (to keep you off your back), weight loss, and surgery including a tongue pacing device. There is more detail about some of these options below.            1. CPAP-  WHAT DOES IT DO AND HOW CAN I LEARN TO WEAR IT?                               BEFORE I START, CAN I WATCH A MOVIE TO GET A PLAN ON HOW TO USE CPAP?  https://www.[employer].com/watch?x=n1C95ab147Z      Continuous positive airway pressure, or CPAP, is the most effective treatment for obstructive sleep apnea. It works by blowing room air, through a mask, to hold your throat open. A decision to use CPAP is a major step forward in the pursuit of a healthier life. The successful use of CPAP will help you breathe easier, sleep better and live healthier. You can choose CPAP equipment from any durable medical equipment provider that meets your needs.  Using CPAP can be a positive experience if you keep these brown points in mind:  1. Commitment  CPAP is not a quick fix for your problem. It involves a long-term commitment to improve your sleep and your health.    2. Communication  Stay in close communication with both your sleep doctor and your CPAP supplier. Ask lots of questions and seek help when you need it.    3. Consistency  Use CPAP all night, every night and for every nap. You will receive the maximum health benefits from CPAP when you use it every time that you sleep. This will also make it easier for your body to adjust to the treatment.    4. Correction  The first machine and mask that you try may not be the best ones for you. Work with your sleep doctor and your CPAP supplier to make corrections to your equipment selection. Ask about trying a different type of machine or mask if you have ongoing problems. Make sure that  "your mask is a good fit and learn to use your equipment properly.    5. Challenge  Tell a family member or close friend to ask you each morning if you used your CPAP the previous night. Have someone to challenge you to give it your best effort.    6. Connection   Your adjustment to CPAP will be easier if you are able to connect with others who use the same treatment. Ask your sleep doctor if there is a support group in your area for people who have sleep apnea, or look for one on the Internet.  7. Comfort   Increase your level of comfort by using a saline spray, decongestant or heated humidifier if CPAP irritates your nose, mouth or throat. Use your unit's \"ramp\" setting to slowly get used to the air pressure level. There may be soft pads you can buy that will fit over your mask straps. Look on www.CPAP.com for accessories that can help make CPAP use more comfortable.  8. Cleaning   Clean your mask, tubing and headgear on a regular basis. Put this time in your schedule so that you don't forget to do it. Check and replace the filters for your CPAP unit and humidifier.    9. Completion   Although you are never finished with CPAP therapy, you should reward yourself by celebrating the completion of your first month of treatment. Expect this first month to be your hardest period of adjustment. It will involve some trial and error as you find the machine, mask and pressure settings that are right for you.    10. Continuation  After your first month of treatment, continue to make a daily commitment to use your CPAP all night, every night and for every nap.    CPAP-Tips to starting with success:  Begin using your CPAP for short periods of time during the day while you watch TV or read.    Use CPAP every night and for every nap. Using it less often reduces the health benefits and makes it harder for your body to get used to it.    Make small adjustments to your mask, tubing, straps and headgear until you get the right fit. " Tightening the mask may actually worsen the leak.  If it leaves significant marks on your face or irritates the bridge of your nose, it may not be the best mask for you.  Speak with the person who supplied the mask and consider trying other masks. Insurances will allow you to try different masks during the first month of starting CPAP.  Insurance also covers a new mask, hose and filter about every 6 months.    Use a saline nasal spray to ease mild nasal congestion. Neti-Pot or saline nasal rinses may also help. Nasal gel sprays can help reduce nasal dryness.  Biotene mouthwash can be helpful to protect your teeth if you experience frequent dry mouth.  Dry mouth may be a sign of air escaping out of your mouth or out of the mask in the case of a full face mask.  Speak with your provider if you expect that is the case.     Take a nasal decongestant to relieve more severe nasal or sinus congestion.  Do not use Afrin (oxymetazoline) nasal spray more than 3 days in a row.  Speak with your sleep doctor if your nasal congestion is chronic.    Use a heated humidifier that fits your CPAP model to enhance your breathing comfort. Adjust the heat setting up if you get a dry nose or throat, down if you get condensation in the hose or mask.  Position the CPAP lower than you so that any condensation in the hose drains back into the machine rather than towards the mask.    Try a system that uses nasal pillows if traditional masks give you problems.    Clean your mask, tubing and headgear once a week. Make sure the equipment dries fully.    Regularly check and replace the filters for your CPAP unit and humidifier.    Work closely with your sleep provider and your CPAP supplier to make sure that you have the machine, mask and air pressure setting that works best for you. It is better to stop using it and call your provider to solve problems than to lay awake all night frustrated with the device.      BESIDES CPAP, WHAT OTHER THERAPIES  ARE THERE?        Positioning Device  Positioning devices are generally used when sleep apnea is mild and only occurs on your back.This example shows a pillow that straps around the waist. It may be appropriate for those whose sleep study shows milder sleep apnea that occurs primarily when lying flat on one's back. Preliminary studies have shown benefit but effectiveness at home may need to be verified by a home sleep test. These devices are generally not covered by medical insurance.                        Oral Appliance  What is oral appliance therapy?  An oral appliance is a small acrylic device that fits over the upper and lower teeth or tongue (similar to an orthodontic retainer or a mouth guard). This device slightly advances the lower jaw or tongue, which moves the base of the tongue forward, opens the airway, improves breathing and can effectively treat snoring and obstructive sleep apnea sleep apnea. The appliance is fabricated and customized by a qualified dentist with experience in treating snoring and sleep apnea. Oral appliances are usually well tolerated and have relatively high compliance by patients1, 2, 3.  When is an oral appliance indicated?  Oral appliance therapy is recommended as a first-line treatment for patients with primary snoring, mild sleep apnea, and for patients with moderate sleep apnea who prefer appliance therapy to use of CPAP4, 5. Severity of sleep apnea is determined by sleep testing and is based on the number of respiratory events per hour of sleep.   How successful is oral appliance therapy?  The success rate of oral appliance therapy in patients with mild sleep apnea is 75-80% while in patients with moderate sleep apnea it is 50-70%. The chance of success in patients with severe sleep apnea is 40-50%. The research also shows that oral appliances have a beneficial effect on the cardiovascular health of CRISTIANA patients at the same magnitude as CPAP therapy7.  Oral appliances should  be a second-line treatment in cases of severe sleep apnea, but if not completely successful then a combination therapy utilizing CPAP plus oral appliance therapy may be effective. Oral appliances tend to be effective in a broad range of patients although studies show that the patients who have the highest success are females, younger patients, those with milder disease, and less severe obesity. 3, 6.   The chances of success are lower in patients who have more severe CRISTIANA, are older, and those who are morbidly obese.     Example of an oral appliance   Finding a dentist that practices dental sleep medicine  Specific training is available through the American Academy of Dental Sleep Medicine for dentists interested in working in the field of sleep. To find a dentist who is educated in the field of sleep and the use of oral appliances, near you, visit the Web site of the American Academy of Dental Sleep Medicine; also see   http://www.accpstorage.org/newOrganization/patients/oralAppliances.pdf  To search for a dentist certified in these practices:  Http://aadsm.org/FindADentist.aspx?1  1. Soraya et al. Objectively measured vs self-reported compliance during oral appliance therapy for sleep-disordered breathing. Chest 2013; 144(5): 5343-3170.  2. Lewis, et al. Objective measurement of compliance during oral appliance therapy for sleep-disordered breathing. Thorax 2013; 68(1): 91-96.  3. Desi et al. Mandibular advancement devices in 620 men and women with CRISTIANA and snoring: tolerability and predictors of treatment success. Chest 2004; 125: 7455-2973.  4. Judy, et al. Oral appliances for snoring and CRISTIANA: a review. Sleep 2006; 29: 244-262.  5. Marques et al. Oral appliance treatment for CRISTIANA: an update. J Clin Sleep Med 2014; 10(2): 215-227.  6. Amber et al. Predictors of OSAH treatment outcome. J Dent Res 2007; 86: 7117-6422.      Weight Loss:    Weight management is a personal decision.  If you are  interested in exploring weight loss strategies, the following discussion covers the impact on weight loss on sleep apnea and the approaches that may be successful.    Weight loss decreases severity of sleep apnea in most people with obesity. For those with mild obesity who have developed snoring with weight gain, even 15-30 pound weight loss can improve and occasionally eliminate sleep apnea.  Structured and life-long dietary and health habits are necessary to lose weight and keep healthier weight levels.     Though there may be significant health benefits from weight loss, long-term weight loss is very difficult to achieve- studies show success with dietary management in less than 10% of people. In addition, substantial weight loss may require years of dietary control and may be difficult if patients have severe obesity. In these cases, surgical management may be considered.  Finally, older individuals who have tolerated obesity without health complications may be less likely to benefit from weight loss strategies.    Your BMI is Body mass index is 43.54 kg/(m^2).  Body mass index (BMI) is one way to tell whether you are at a healthy weight, overweight, or obese. It measures your weight in relation to your height.  A BMI of 18.5 to 24.9 is in the healthy range. A person with a BMI of 25 to 29.9 is considered overweight, and someone with a BMI of 30 or greater is considered obese. More than two-thirds of American adults are considered overweight or obese.  Being overweight or obese increases the risk for further weight gain. Excess weight may lead to heart disease and diabetes.  Creating and following plans for healthy eating and physical activity may help you improve your health.  Weight control is part of healthy lifestyle and includes exercise, emotional health, and healthy eating habits. Careful eating habits lifelong are the mainstay of weight control. Though there are significant health benefits from weight  loss, long-term weight loss with diet alone may be very difficult to achieve- studies show long-term success with dietary management in less than 10% of people. Attaining a healthy weight may be especially difficult to achieve in those with severe obesity. In some cases, medications, devices and surgical management might be considered.  What can you do?  If you are overweight or obese and are interested in methods for weight loss, you should discuss this with your provider.     Consider reducing daily calorie intake by 500 calories.     Keep a food journal.     Avoiding skipping meals, consider cutting portions instead.    Diet combined with exercise helps maintain muscle while optimizing fat loss. Strength training is particularly important for building and maintaining muscle mass. Exercise helps reduce stress, increase energy, and improves fitness. Increasing exercise without diet control, however, may not burn enough calories to loose weight.       Start walking three days a week 10-20 minutes at a time    Work towards walking thirty minutes five days a week     Eventually, increase the speed of your walking for 1-2 minutes at time    In addition, we recommend that you review healthy lifestyles and methods for weight loss available through the National Institutes of Health patient information sites:  http://win.niddk.nih.gov/publications/index.htm    And look into health and wellness programs that may be available through your health insurance provider, employer, local community center, or kan[employer] club.    Surgery:    Upper Airway Surgery for CRISTIANA  Surgery for CRISTIANA is a second-line treatment option in the management of sleep apnea.  Surgery should be considered for patients who are having a difficult time tolerating CPAP.    Surgery for CRISTIANA is directed at areas that are responsible for narrowing or complete obstruction of the airway during sleep.  There are a wide range of procedures available to enlarge and/or  stabilize the airway to prevent blockage of breathing in the three major areas where it can occur: the palate, tongue, and nasal regions.  Successful surgical treatment depends on the accurate identification of the factors responsible for obstructive sleep apnea in each person.  A personalized approach is required because there is no single treatment that works well for everyone.  Because of anatomic variation, consultation with an examination by a sleep surgeon is a critical first step in determining what surgical options are best for each patient.  In some cases, examination during sedation may be recommended in order to guide the selection of procedures.  Patients will be counseled about risks and benefits as well as the typical recovery course after surgery. Surgery is typically not a cure for a person s CRISTIANA.  However, surgery will often significantly improve one s CRISTIANA severity (termed  success rate ).  Even in the absence of a cure, surgery will decrease the cardiovascular risk associated with OSA7; improve overall quality of life8 (sleepiness, functionality, sleep quality, etc).          Palate Procedures:  Patients with CRISTIANA often have narrowing of their airway in the region of their tonsils and uvula.  The goals of palate procedures are to widen the airway in this region as well as to help the tissues resist collapse.  Modern palate procedure techniques focus on tissue conservation and soft tissue rearrangement, rather than tissue removal.  Often the uvula is preserved in this procedure. Residual sleep apnea is common in patient after pharyngoplasty with an average reduction in sleep apnea events of 33%2.      Tongue Procedures:  While patients are awake, the muscles that surround the throat are active and keep this region open for breathing. These muscles relax during sleep, allowing the tongue and other structures to collapse and block breathing.  There are several different tongue procedures available.   Selection of a tongue base procedure depends on characteristics seen on physical exam.  Generally, procedures are aimed at removing bulky tissues in this area or preventing the back of the tongue from falling back during sleep.  Success rates for tongue surgery range from 50-62%3.    Hypoglossal Nerve Stimulation:  Hypoglossal nerve stimulation has recently received approval from the [place] Food and Drug Administration for the treatment of obstructive sleep apnea.  This is based on research showing that the system was safe and effective in treating sleep apnea6.  Results showed that the median AHI score decreased 68%, from 29.3 to 9.0. This therapy uses an implant system that senses breathing patterns and delivers mild stimulation to airway muscles, which keeps the airway open during sleep.  The system consists of three fully implanted components: a small generator (similar in size to a pacemaker), a breathing sensor, and a stimulation lead.  Using a small handheld remote, a patient turns the therapy on before bed and off upon awakening.    Candidates for this device must be greater than 22 years of age, have moderate to severe CRISTIANA (AHI between 20-65), BMI less than 32, have tried CPAP/oral appliance without success, and have appropriate upper airway anatomy (determined by a sleep endoscopy performed by Dr. Guzman).    Hypoglossal Nerve Stimulation Pathway:    The sleep surgeon s office will work with the patient through the insurance prior-authorization process (including communications and appeals).    Nasal Procedures:  Nasal obstruction can interfere with nasal breathing during the day and night.  Studies have shown that relief of nasal obstruction can improve the ability of some patients to tolerate positive airway pressure therapy for obstructive sleep apnea1.  Treatment options include medications such as nasal saline, topical corticosteroid and antihistamine sprays, and oral medications such as  antihistamines or decongestants. Non-surgical treatments can include external nasal dilators for selected patients. If these are not successful by themselves, surgery can improve the nasal airway either alone or in combination with these other options.    Combination Procedures:  Combination of surgical procedures and other treatments may be recommended, particularly if patients have more than one area of narrowing or persistent positional disease.  The success rate of combination surgery ranges from 66-80%2,3.      1. Marlene HURTS. The Role of the Nose in Snoring and Obstructive Sleep Apnoea: An Update.  Eur Arch Otorhinolaryngol. 2011; 268: 1365-73.  2.  Sheron SM; Eddi JA; Bao JR; Pallanch JF; Abby MB; Carolina SG; Natalie DUNN. Surgical modifications of the upper airway for obstructive sleep apnea in adults: a systematic review and meta-analysis. SLEEP 2010;33(10):7045-6292. Davi LAIRD. Hypopharyngeal surgery in obstructive sleep apnea: an evidence-based medicine review.  Arch Otolaryngol Head Neck Surg. 2006 Feb;132(2):206-13.  3. Israel COFFEYH1, Rosalie Y, Howard LUCIANO. The efficacy of anatomically based multilevel surgery for obstructive sleep apnea. Otolaryngol Head Neck Surg. 2003 Oct;129(4):327-35.  4. Davi LAIRD, Goldberg A. Hypopharyngeal Surgery in Obstructive Sleep Apnea: An Evidence-Based Medicine Review. Arch Otolaryngol Head Neck Surg. 2006 Feb;132(2):206-13.  5. Monica IVORY et al. Upper-Airway Stimulation for Obstructive Sleep Apnea.  N Engl J Med. 2014 Jan 9;370(2):139-49.  6. Flaco Y et al. Increased Incidence of Cardiovascular Disease in Middle-aged Men with Obstructive Sleep Apnea. Am J Respir Crit Care Med; 2002 166: 159-165  7. Jacquelin LEMUS et al. Studying Life Effects and Effectiveness of Palatopharyngoplasty (SLEEP) study: Subjective Outcomes of Isolated Uvulopalatopharyngoplasty. Otolaryngol Head Neck Surg. 2011; 144: 623-631.  Please, schedule sleep study and follow up visit with the nurse (she will  coming into the room and meet with you). Use sleeping aid only if needed during the night of the study.    Thank you!    Warner Mims MD, MPH  Clinical Sleep and Occupational / Environmental Medicine

## 2017-10-17 NOTE — PROGRESS NOTES
"[facility]  Outpatient Sleep Medicine Consultation  October 17, 2017    Name: Savanna Rehman MRN# [id number]   Age: 76 year old YOB: 1940     Date of Consultation: October 17, 2017  Consultation is requested by: Dottie Geller MD  [address]  [place] [address]  Primary care provider: Hilary Finney    Patient is accompanied by: Patient presents alone today       Reason for Sleep Consult:     Savanna Rehman is a 76 year old female patient that presents here for an initial evaluation due to \"sleep apnea.\"         Assessment and Plan:     Summary Sleep Diagnoses:    (1) CRISTIANA  (2) Chronic Atrial Fibrillation  (3) Morbid Obesity    Summary Recommendations / Discussion:    This patient was diagnosed with CRISTIANA of unknown severity in the past. Unfortunately, official report are not available and the patient has not bene using the PAP devise for a long time. This patient has sxs, hx, and physical findings that still suggest the diagnosis of a sleep disordered breathing. In addition, she has a high pre-test probability of CRISTIANA. Given that she has a history of chronic atrial fibrillation with also possible hypoventilation syndrome, a portable HST sleep study should not be performed and it would not be the best testing alternative for this patient. Instead, this patient should undergo an in-lab split-night PSG sleep study (the patient should be split at 15 events per hour). Based on the patient's history, clinical presentation, and today's physical examination, there is a reasonable level of suspicion for hypoventilation and, therefore, TCM monitoring as well as ABG studies would be necessary for a complete evaluation (ABG studies should be obtained if TCM suggests hypoventilation). Recent echocardiogram showed an adequate LVEF at 55 - 60%. As such, if needed, the patient may be titrated as needed (including ASV titration). Today, the nature and pathophysiology of CRISTIANA and " hypoventilation syndrome were discussed. The different treatment options for CRISTIANA as well as hypoventilation syndrome were also reviewed and explained today. The patient was given zolpidem 2.5 mg to use only during the night of the study and only if needed (medication side effects and possible complications discussed). Lifestyle recommendations including healthy dietary and exercising habits were discussed. Pt will follow up with me after having completed an in-lab PSG sleep study.    Coding:  (I48.2) Chronic atrial fibrillation (H)  (primary encounter diagnosis)  (G47.33) CRISTIANA (obstructive sleep apnea)  (E66.09,  Z68.41) Class 3 obesity due to excess calories with serious comorbidity and body mass index (BMI) of 40.0 to 44.9 in adult (H)    Counseling included a comprehensive review of diagnostic and therapeutic strategies as well as risks of inadequate therapy.    Educational materials provided in instructions. The patient was instructed to avoid driving or operating any heavy machinery when experiencing drowsiness.    All questions and concerns were addressed today. Pt agrees and understands the assessment and plan.         History of Present Illness:   Savanna Rehman is a 76 year old  RHD female pt with PMH of Diaphragmatic hernia, hiatal hernia, morbid obesity, fibromyalgia, DM type 2, dyslipidemia, chronic atrial fibrillation, chronic diastolic CHF, and previously diagnosed CRISTIANA currently on PAP therapy presents for an initial evaluation today due to CRISTIANA. This patient was evaluated in 2016 due to CP. Cardiac catheterization performed then showed no obstructive disease. The patient underwent a recent echocardiogram that demonstrated LV with normal size and systolic function with EF estimated at 55 - 60% (see below of summary of echocardiogram). I was asked to evaluated this patient for possible CRISTIANA as well as hypoventilation syndrome by Dr Dan.     The patient was diagnosed with CRISTIANA over 10 yrs  ago while she was living in [place]. Unfortunately, the patient does not remember where the study was conducted and the official report is no available today. Since the diagnosis, the patient has been treated with PAP therapy. Pt has not been using the PAP device recently, but he wishes to start once gain. Currently, without the PAP device, the patient reports snoring as well as gasping / choking for air. Pt sleeps alone and she is not sure if she has witnessed apneas. Pt reports non-restorative sleep with sleep inertia. She also reports EDS with no inadvertent naps. Pt also reports a dull cephalgia present mainly in the morning. The cephalgia is localized in the frontal area and resolves after a few hours of being awake. Pt reports nocturia x 4 to 5 throughout the night. The patient denies any GERD sxs, CP, SOB, positional dyspnea, or any other sxs or concerns. Pt does not drive.    Pt explains that she has not used the PAP device for many years due to claustrophobia. Pt feels more comfortable with nasal pillows.    SLEEP-WAKE SCHEDULE:     Savanna Rehman      -Describes herself as a night person; prefers to go to sleep at 11:00 PM and wakes up at 7:00 AM.      -Naps 3-4 times per week for 30-60 minutes, feels refreshed after naps; takes no inadvertent naps.      -ON WEEKDAYS, goes to sleep at 11:00 PM during the week; awakens 7:30 AM with an alarm; falls asleep in 10 minutes; denies difficulty falling asleep.      -ON WEEKENDS, goes to sleep at 11:00 PM and wakes up at 7:30 AM with an alarm; falls asleep in 10 minutes.        -Awakens 2-4 times a night for less 5 minutes before falling back to sleep; awakens to go to the bathroom.      BEDTIME ACTIVITIES AND SHIFT WORK:    Savanna Rehman     -Bedtime Activities and Other Sleeping Information: Pt lives with her cat. Pt sleeps alone. The cat sleeps in the room with the patient. Pt sleeps on her sides. Pt reads, watches TV, and uses cell phone in  bed.     -Occupation: Retired     [employer]        -SLEEP APNEA: Stopbang score: 7 / 8        -SLEEPINESS: [place] sleepiness scale (ESS):  6 / 24    Drowsy driving / near accidents: Pt does not drive    Consequences: EDS and non-restorative sleep.    SLEEP COMPLAINTS:  Cardio-respiratory     -Snoring: Significant snoring reported    -Dyspnea: Pt denies having any witnessed apneas or dyspnea   -Morning headaches or confusion: Cephalgia reported (see above)   -Coexisting Lung disease: Denies diagnosed or known lung disease at this time     -Coexisting Heart disease: Atrial fibrillation     -Does patient have a bed partner: Patient sleeps alone   -Has bed partner been sleeping separately because of snoring:  N/A            RLS Screen:    -When you try to relax in the evening or sleep at night, do you ever have unpleasant, restless feelings in your legs that can be relieved by walking or movement? None Reported     -Periodic limb movement: None Reported    Narcolepsy:     - Denies sudden urges of sleep attacks   - Denies cataplexy   - Denies sleep paralysis    - Denies hallucinations    - Denies feeling refreshed after a nap.    Sleep Behaviors:   - Denies leg symptoms/movements   - Denies motor restlessness   - Denies night terrors   - Denies bruxism   - Denies automatic behaviors    Other Subjective Complaints:   - Denies anxiety or rumination    - Denies pain and discomfort at night   - Denies waking up with heart pounding or racing   - Denies GERD or aspiration         Parasomnia:    -NREM - Denies recurrent persistent confusional arousal, night eating, sleep walking or sleep terrors. Pt uses to sleepwalking as a child. This resolved.      -REM - Denies dream enactment or injuries.     -Driving Accident or Near Accidents: Pt does not drive         Medications:     Current Outpatient Prescriptions   Medication Sig     ACETAMINOPHEN PO Take 500 mg by mouth     losartan (COZAAR) 25 MG tablet Take 1 tablet (25 mg) by  mouth daily     triamcinolone (KENALOG) 0.1 % cream Apply topically 2 times daily     biotin (BIOTIN 5000) 5 MG CAPS      ranitidine (ZANTAC) 150 MG capsule Take by mouth 2 times daily     pravastatin (PRAVACHOL) 20 MG tablet Take 20 mg by mouth At Bedtime ON HOLD      glipiZIDE (GLUCOTROL XL) 2.5 MG 24 hr tablet Take 2.5 mg by mouth daily     WARFARIN SODIUM PO Take 2.5 mg by mouth Take on Mon, Tues, Wed, Thurs, Fri, Sat (6 days a week)     WARFARIN SODIUM PO Take 5 mg by mouth on Sundays     Ibuprofen (ADVIL PO) Take 400 mg by mouth every 8 hours as needed for moderate pain     LORazepam (ATIVAN) 0.5 MG tablet Take 1 tablet (0.5 mg) by mouth every 4 hours as needed for anxiety or other (chest pain) (Patient taking differently: Take 0.5 mg by mouth as needed for anxiety or other (chest pain) )     OXYBUTYNIN CHLORIDE PO Take 5 mg by mouth 2 times daily Clarified as immediate release with [facility]./Patient     No current facility-administered medications for this visit.      Facility-Administered Medications Ordered in Other Visits   Medication     nitroglycerin 100 MCG/ML injection      Allergies   Allergen Reactions     Petroleum Jelly [Petrolatum] Anaphylaxis     Rash and swelling     Shellfish-Derived Products Anaphylaxis     Tongue swelling     Aspirin Swelling     tiongue swelling     Bacitracin      Rash swelling     Coumadin [Warfarin] Swelling     Leg swelling     Darvon [Propoxyphene] Swelling     Throat closes     Dilaudid [Hydromorphone]      Levaquin [Levofloxacin] Swelling     Tongue swelling     Neosporin [Neomycin-Polymyx-Gramicid] Swelling     rash     Oxycodone      Severe itching     Percodan [Oxycodone-Aspirin]      Severe itching     Tramadol      Vicodin [Hydrocodone-Acetaminophen]      Severe itching       Xarelto [Rivaroxaban]      Adhesive Tape Rash     Band aids      Codeine Rash          Past Medical History:     Denies needing any 02 supplement at night.    Past Medical History:    Diagnosis Date     Anemia     Iron Deficiency anemia     Arrhythmia     Afib     Atrial fibrillation (H)      CAD (coronary artery disease)     non-obstructive     Congestive heart failure (H)      Degenerative disk disease      Fibromyalgia      Gastro-oesophageal reflux disease      Hiatal hernia      History of blood transfusion      Neuropathy      Other chronic pain     neck, low back, legs     Pernicious anemia      Sleep apnea     uses CPAP.     Urinary incontinence      Vitamin D deficiency            Past Surgical History:    Admits previous upper airway surgery.     Past Surgical History:   Procedure Laterality Date     APPENDECTOMY       C RAD RESEC TONSIL/PILLARS       CHOLECYSTECTOMY       COLONOSCOPY  3/15/2011     CORONARY ANGIOGRAPHY ADULT ORDER       HEART CATH LEFT HEART CATH  12/30/16    medication management     HYSTERECTOMY TOTAL ABDOMINAL       KNEE SURGERY Left      LAPAROSCOPIC NISSEN FUNDOPLICATION N/A 2/4/2015    Procedure: LAPAROSCOPIC NISSEN FUNDOPLICATION;  Surgeon: Armando Ansari MD;  Location:  OR     ORTHOPEDIC SURGERY      joint replacement     ORTHOPEDIC SURGERY Left     knee replacement     ORTHOPEDIC SURGERY      ulnar transposition     SOFT TISSUE SURGERY Left     lt elbow mass, ulnar transposition          Social History:     Social History   Substance Use Topics     Smoking status: Former Smoker     Types: Cigarettes     Quit date: 9/1/2014     Smokeless tobacco: Not on file     Alcohol use Yes      Comment: socially.     Chemical History:     Tobacco: Quit smoking about 3 yrs ago     Uses 1 sodas/day.    Supplements for wakefulness: Patient does not use any supplements to stay awake    EtOH: Only occasional use  Recreational Drugs: Patient denies using any recreational drugs     Psych Hx:   Current dangers to self or others: No. Pt denies any SI / HI, hallucinations, or delusions         Family History:     Family History   Problem Relation Age of Onset      "Unknown/Adopted No family hx of       Sleep Family Hx:       RLS - None reported   CRISTIANA - Siblings  Insomnia - None reported  Parasomnia - None reported         Review of Systems:     A complete 10 point review of systems was negative other than HPI or as commented below:     CONSTITUTIONAL: NEGATIVE for weight gain/loss, fever, chills, sweats or night sweats, drug allergies.  EYES: NEGATIVE for changes in vision, blind spots, double vision.  ENT: NEGATIVE for ear pain, sore throat, sinus pain, post-nasal drip, runny nose, bloody nose  CARDIAC: NEGATIVE for fast chest pain or pressure, breathlessness when lying flat, swollen legs or swollen feet. Heartbeats or fluttering in chest reported.  NEUROLOGIC: NEGATIVE headaches, weakness or numbness in the arms or legs.  DERMATOLOGIC: NEGATIVE for rashes, new moles or change in mole(s)  PULMONARY: NEGATIVE SOB at rest, dry cough, productive cough, coughing up blood, wheezing or whistling when breathing. Pt endorses SOB with activity.  GASTROINTESTINAL: NEGATIVE for nausea or vomitting, loose or watery stools, fat or grease in stools, constipation, abdominal pain, bowel movements black in color or blood noted.  GENITOURINARY: NEGATIVE for pain during urination, blood in urine, urinating more frequently than usual, irregular menstrual periods.  MUSCULOSKELETAL: NEGATIVE for swollen joints. Pt reports muscle as well as joint pain.  ENDOCRINE: NEGATIVE for increased thirst or urination, diabetes.  LYMPHATIC: NEGATIVE for swollen lymph nodes, lumps or bumps in the breasts or nipple discharge.         Physical Examination:   /75  Pulse 65  Resp 16  Ht 1.702 m (5' 7\")  Wt 126.1 kg (278 lb)  SpO2 95%  BMI 43.54 kg/m2     VS: Reviewed and normal.  General: Alert, oriented, not in distress. Dressed casually; Good eye contact; Comfortably sitting in a chair; in no apparent distress  HEENT: Normocephalic and atraumatic; Left cerumen impaction noted with normal right TM; " pupils are isocoric and equally responsive to the light. PERRLA. EOMI. Normal fundoscopic examination; Nasal turbinates are normal with a normal septal alignment;  Mallampati score: Grade III; Tonsillar hypertrophy: 1  hidden by pillars; Pharynx with no erythema or exudates.  NECK: Neck supple; symmetrical; no lymphadenopathy; no thyromegaly, bruit, JVD noted. Neck circumference of 17.25 inches (44 cm).  Lungs: Both hemithoraces are symmetrical, normal to palpation, no dullness to percussion, auscultation of lungs revealed normal breath sounds with no expirium prolongation, wheezing, rhonci and crackles.  CVS: Normal S1 and S2 heart sounds with no extra heart sounds. No murmur, rubs, or clicks. Normal peripheral pulses throughout with no obvious peripheral edema.  Abdomen: Bowel sounds present. Abdomen is soft, non-tender, and non-distended. No organomegaly, ascitis, or obvious masses noted. Negative CVA tenderness.  Extremities/musculoskeletal: No deformity, cyanosis, or clubbing noted.  Neurology: Awake, alert, and oriented x 3. No obvious gross motor / sensorial deficits with normal strength in all extremities at 5/5 and normal sensation throughout. Cranial nerves are grossly intact with normal II to XII CN functions. Negative Romberg's test with normal gait. DTR are symmetric and normal at 2+/4.  Integumentary: No obvious skin rash.  Psychiatry: Mood and affect are appropriate. Euthymic with affect congruent with full range and intensity. No SI/HI with adequate insight and judgement.          Data: All pertinent previous laboratory data reviewed     No results found for: PH, PHARTERIAL, PO2, [id number], SAT, PCO2, HCO3, BASEEXCESS, DIVINA, BEB  No results found for: TSH  Lab Results   Component Value Date     (H) 08/23/2017     (H) 06/09/2017     Lab Results   Component Value Date    HGB 13.0 04/14/2017    HGB 12.6 01/01/2017     Lab Results   Component Value Date    BUN 15 08/23/2017    BUN 10  06/09/2017    CR 0.86 08/23/2017    CR 0.87 06/09/2017     Echocardiology:    -Echocardiogram obtained on 05/02/2017 showed LV with normal size and normal systolic function with EF estimated at 55 - 60%. The RV showed normal size and function. The LA was normal in size. The RA was borderline enlarged. No obvious atrial shunt was noted. Trace mitral regurgitation noted. Mild tricuspid regurgitation present. Mild aortic valve regurgitation also present. Trace pulmonic valve regurgitation also noted. The rhythm was atrial fibrillation with controlled ventricular rate.    Chest x-ray:    -Chest x-ray films obtained on 04/12/2017 showed mildly prominent heart with otherwise no effusion or pneumothorax.    PFT:    -PFT obtained on 04/20/2017 showed mild obstruction without significant bronchodilator response. Diffusion capacity was normal. FVC was 73%, FEV was 70%, FEV1/FVC was 77%.    Laboratory Studies:   Component Value Flag Ref Range Units Status Collected Lab   Sodium 138  133 - 144 mmol/L Final 08/23/2017  3:04 PM FrRdHs   Potassium 4.2  3.4 - 5.3 mmol/L Final 08/23/2017  3:04 PM FrRdHs   Chloride 104  94 - 109 mmol/L Final 08/23/2017  3:04 PM FrRdHs   Carbon Dioxide 27  20 - 32 mmol/L Final 08/23/2017  3:04 PM FrRdHs   Anion Gap 7  3 - 14 mmol/L Final 08/23/2017  3:04 PM FrRdHs   Glucose 126 (H) 70 - 99 mg/dL Final 08/23/2017  3:04 PM FrRdHs   Urea Nitrogen 15  7 - 30 mg/dL Final 08/23/2017  3:04 PM FrRdHs   Creatinine 0.86  0.52 - 1.04 mg/dL Final 08/23/2017  3:04 PM FrRdHs   GFR Estimate 64  >60 mL/min/1.7m2 Final 08/23/2017  3:04 PM FrRdHs     Component Value Flag Ref Range Units Status Collected Lab   WBC 6.6  4.0 - 11.0 10e9/L Final 04/14/2017  9:54 AM FrStHsLb   RBC Count 4.60  3.8 - 5.2 10e12/L Final 04/14/2017  9:54 AM FrStHsLb   Hemoglobin 13.0  11.7 - 15.7 g/dL Final 04/14/2017  9:54 AM FrStHsLb   Hematocrit 40.3  35.0 - 47.0 % Final 04/14/2017  9:54 AM FrStHsLb   MCV 88  78 - 100 fl Final 04/14/2017   9:54 AM FrStHsLb   MCH 28.3  26.5 - 33.0 pg Final 04/14/2017  9:54 AM FrStHsLb   MCHC 32.3  31.5 - 36.5 g/dL Final 04/14/2017  9:54 AM FrStHsLb   RDW 15.7 (H) 10.0 - 15.0 % Final 04/14/2017  9:54 AM FrStHsLb   Platelet Count 229  150 - 450 10e9/L Final 04/14/2017  9:54 AM FrStHsLb   Diff Method     Final 04/14/2017  9:54 AM FrStHsLb   Automated Method   % Neutrophils 55.4   % Final 04/14/2017  9:54 AM FrStHsLb   % Lymphocytes 28.2   % Final 04/14/2017  9:54 AM FrStHsLb   % Monocytes 9.9   % Final 04/14/2017  9:54 AM FrStHsLb   % Eosinophils 5.6   % Final 04/14/2017  9:54 AM FrStHsLb   % Basophils 0.6   % Final 04/14/2017  9:54 AM FrStHsLb   % Immature Granulocytes 0.3   % Final 04/14/2017  9:54 AM FrStHsLb   Nucleated RBCs 0  0 /100 Final 04/14/2017  9:54 AM FrStHsLb   Absolute Neutrophil 3.7  1.6 - 8.3 10e9/L Final 04/14/2017  9:54 AM FrStHsLb   Absolute Lymphocytes 1.9  0.8 - 5.3 10e9/L Final 04/14/2017  9:54 AM FrStHsLb   Absolute Monocytes 0.7  0.0 - 1.3 10e9/L Final 04/14/2017  9:54 AM FrStHsLb   Absolute Eosinophils 0.4  0.0 - 0.7 10e9/L Final 04/14/2017  9:54 AM FrStHsLb   Absolute Basophils 0.0  0.0 - 0.2 10e9/L Final 04/14/2017  9:54 AM FrStHsLb   Abs Immature Granulocytes 0.0  0 - 0.4 10e9/L Final 04/14/2017  9:54 AM FrStHsLb   Absolute Nucleated RBC 0.0    Final 04/14/2017  9:54 AM FrStHsLb     Warner Mims MD, MPH  Clinical Sleep Medicine    Total time spent with patient: 60 min. Over >50% of the time was spent for face to face counseling, education, and evaluation.

## 2017-10-17 NOTE — NURSING NOTE
"Chief Complaint   Patient presents with     Consult     Has had sleep studies, cannot remember where, many years ago.  Snores, Has not used cpap for 2 years       Initial /75  Pulse 65  Resp 16  Ht 1.702 m (5' 7\")  Wt 126.1 kg (278 lb)  SpO2 95%  BMI 43.54 kg/m2 Estimated body mass index is 43.54 kg/(m^2) as calculated from the following:    Height as of this encounter: 1.702 m (5' 7\").    Weight as of this encounter: 126.1 kg (278 lb).  Medication Reconciliation: complete       Neck 44cm  17.25in  Ess 6      Jeanne Zepeda LPN/CMA  "

## 2017-11-10 ENCOUNTER — OFFICE VISIT (OUTPATIENT)
Dept: CARDIOLOGY | Facility: CLINIC | Age: 77
End: 2017-11-10
Payer: MEDICARE

## 2017-11-10 VITALS — WEIGHT: 285 LBS | HEIGHT: 68 IN | BODY MASS INDEX: 43.19 KG/M2

## 2017-11-10 DIAGNOSIS — I48.0 PAROXYSMAL ATRIAL FIBRILLATION (H): ICD-10-CM

## 2017-11-10 DIAGNOSIS — I10 BENIGN ESSENTIAL HYPERTENSION: ICD-10-CM

## 2017-11-10 DIAGNOSIS — I48.91 ATRIAL FIBRILLATION, UNSPECIFIED TYPE (H): Primary | ICD-10-CM

## 2017-11-10 DIAGNOSIS — G47.33 OSA (OBSTRUCTIVE SLEEP APNEA): ICD-10-CM

## 2017-11-10 LAB
ALBUMIN SERPL-MCNC: 3.6 G/DL (ref 3.4–5)
ALP SERPL-CCNC: 81 U/L (ref 40–150)
ALT SERPL W P-5'-P-CCNC: 30 U/L (ref 0–50)
ANION GAP SERPL CALCULATED.3IONS-SCNC: 4 MMOL/L (ref 3–14)
AST SERPL W P-5'-P-CCNC: 29 U/L (ref 0–45)
BILIRUB SERPL-MCNC: 1 MG/DL (ref 0.2–1.3)
BUN SERPL-MCNC: 11 MG/DL (ref 7–30)
CALCIUM SERPL-MCNC: 8.8 MG/DL (ref 8.5–10.1)
CHLORIDE SERPL-SCNC: 101 MMOL/L (ref 94–109)
CO2 SERPL-SCNC: 31 MMOL/L (ref 20–32)
CREAT SERPL-MCNC: 0.96 MG/DL (ref 0.52–1.04)
GFR SERPL CREATININE-BSD FRML MDRD: 57 ML/MIN/1.7M2
GLUCOSE SERPL-MCNC: 155 MG/DL (ref 70–99)
POTASSIUM SERPL-SCNC: 4.2 MMOL/L (ref 3.4–5.3)
PROT SERPL-MCNC: 8 G/DL (ref 6.8–8.8)
SODIUM SERPL-SCNC: 136 MMOL/L (ref 133–144)

## 2017-11-10 PROCEDURE — 36415 COLL VENOUS BLD VENIPUNCTURE: CPT | Performed by: INTERNAL MEDICINE

## 2017-11-10 PROCEDURE — 80053 COMPREHEN METABOLIC PANEL: CPT | Performed by: INTERNAL MEDICINE

## 2017-11-10 PROCEDURE — 99214 OFFICE O/P EST MOD 30 MIN: CPT | Performed by: INTERNAL MEDICINE

## 2017-11-10 NOTE — MR AVS SNAPSHOT
After Visit Summary   11/10/2017    Savanna Rehman    MRN: [id number]           Patient Information     Date Of Birth          1940        Visit Information        Provider Department      11/10/2017 2:45 PM Dottie Geller MD [facility]        Today's Diagnoses     Atrial fibrillation, unspecified type (H)    -  1       Follow-ups after your visit        Additional Services     Follow-Up with Cardiac Advanced Practice Provider                 Your next 10 appointments already scheduled     Nov 17, 2017 10:00 AM CST   Holter Monitor with RSCC DEVICE [facility] ([facility] [place] [facility] [place]    [address]  [facility] [address]   [phone]           LOCATION - [address], [address] [place] [address] **Please check-in at the [place] Registration Office, [address], in the [facility] building. When you are finished registering, please go to [address] and have a seat.            Dec 04, 2017  8:30 PM CST   Psg Split W/Tcm with BED 5 SH SLEEP   [facility] ([facility])    [address] [place] [address] [address]  [facility] [address]   [phone]            Dec 12, 2017  2:00 PM CST   Return Sleep Patient with Warner Mims MD   [facility] ([facility] [place]    [address]  [facility] [id number] [phone]              Future tests that were ordered for you today     Open Future Orders        Priority Expected Expires Ordered    Follow-Up with Cardiac Advanced Practice Provider Routine 8/10/2018 11/10/2019 11/10/2017            Who to contact     If you have questions or need follow up information about today's clinic visit or your schedule please contact [facility] directly at [phone].  Normal or non-critical lab  "and imaging results will be communicated to you by MyChart, letter or phone within 4 business days after the clinic has received the results. If you do not hear from us within 7 days, please contact the clinic through [employer] or phone. If you have a critical or abnormal lab result, we will notify you by phone as soon as possible.  Submit refill requests through Yaphie or call your pharmacy and they will forward the refill request to us. Please allow 3 business days for your refill to be completed.          Additional Information About Your Visit        [employer] Information     Yaphie lets you send messages to your doctor, view your test results, renew your prescriptions, schedule appointments and more. To sign up, go to [email or web address] . Click on \"Log in\" on the left side of the screen, which will take you to the Welcome page. Then click on \"Sign up Now\" on the right side of the page.     You will be asked to enter the access code listed below, as well as some personal information. Please follow the directions to create your username and password.     Your access code is: [email or web address]  Expires: 2017  2:26 PM     Your access code will  in 90 days. If you need help or a new code, please call your [place] clinic or [phone].        Care EveryWhere ID     This is your Care EveryWhere ID. This could be used by other organizations to access your [place] medical records  [id number]        Your Vitals Were     Height BMI (Body Mass Index)                1.727 m (5' 8\") 43.33 kg/m2           Blood Pressure from Last 3 Encounters:   10/17/17 138/75   17 (!) 138/91   17 120/80    Weight from Last 3 Encounters:   11/10/17 129.3 kg (285 lb)   10/17/17 126.1 kg (278 lb)   17 130.6 kg (288 lb)                 Today's Medication Changes          These changes are accurate as of: 11/10/17  3:36 PM.  If you have any questions, ask your nurse or doctor.               These medicines have " changed or have updated prescriptions.        Dose/Directions    LORazepam 0.5 MG tablet   Commonly known as:  ATIVAN   This may have changed:  when to take this   Used for:  Chest pain        Dose:  0.5 mg   Take 1 tablet (0.5 mg) by mouth every 4 hours as needed for anxiety or other (chest pain)   Quantity:  12 tablet   Refills:  0                Primary Care Provider Office Phone # Fax #    Hilary Lucy Finney [phone]       [facility] [address] [facility] [address]        Equal Access to Services     [facility] : Hadii aad ku hadasho Soomaali, waaxda luqadaha, qaybta kaalmada adeegyada, waxjustine phan hayavril hendrikcson . So [facility] [phone].    ATENCIÓN: Si habla español, tiene a maier disposición servicios gratuitos de asistencia lingüística. [facility] [phone].    We comply with applicable federal civil rights laws and [place] laws. We do not discriminate on the basis of race, color, national origin, age, disability, sex, sexual orientation, or gender identity.            Thank you!     Thank you for choosing [facility]  for your care. Our goal is always to provide you with excellent care. Hearing back from our patients is one way we can continue to improve our services. Please take a few minutes to complete the written survey that you may receive in the mail after your visit with us. Thank you!             Your Updated Medication List - Protect others around you: Learn how to safely use, store and throw away your medicines at www.disposemymeds.org.          This list is accurate as of: 11/10/17  3:36 PM.  Always use your most recent med list.                   Brand Name Dispense Instructions for use Diagnosis    ACETAMINOPHEN PO      Take 500 mg by mouth        ADVIL PO      Take 400 mg by mouth every 8 hours as needed for moderate pain        BIOTIN 5000 5 MG Caps   Generic drug:  biotin           glipiZIDE 2.5  MG 24 hr tablet    GLUCOTROL XL     Take 2.5 mg by mouth daily        LORazepam 0.5 MG tablet    ATIVAN    12 tablet    Take 1 tablet (0.5 mg) by mouth every 4 hours as needed for anxiety or other (chest pain)    Chest pain       OXYBUTYNIN CHLORIDE PO      Take 5 mg by mouth 2 times daily Clarified as immediate release with [facility]./Patient        pravastatin 20 MG tablet    PRAVACHOL     Take 20 mg by mouth At Bedtime ON HOLD        ranitidine 150 MG capsule    ZANTAC     Take by mouth 2 times daily        triamcinolone 0.1 % cream    KENALOG     Apply topically 2 times daily        * WARFARIN SODIUM PO      Take 2.5 mg by mouth Take on Mon, Tues, Wed, Thurs, Fri, Sat (6 days a week)        * WARFARIN SODIUM PO      Take 5 mg by mouth on Sundays        * Notice:  This list has 2 medication(s) that are the same as other medications prescribed for you. Read the directions carefully, and ask your doctor or other care provider to review them with you.

## 2017-11-10 NOTE — LETTER
11/10/2017    Hilary Finney MD, MD  [facility] [address]  [facility] [address]      RE: Savanna Rehman       Dear Colleague,    I had the pleasure of seeing Savanna Rehman in the [facility].    REASON FOR VISIT:  Followup visit.      HISTORY OF PRESENT ILLNESS:  Ms. Rehman is a pleasant 74-year-old lady who comes in routine followup.  She has diastolic dysfunction, chronic atrial fibrillation, morbid obesity, fibromyalgia, sleep apnea, type 2 diabetes and dyslipidemia.      She had no obstructive disease on cardiac catheterization in 2016 in the setting of an echocardiogram suggesting distal anteroseptal hypokinesis with a presentation with elevated troponins.  She repeated echocardiogram in May of last year without regional wall motion abnormalities.      She has been doing well since then and has no cardiorespiratory complaints of chest pain, shortness of breath, lightheadedness, presyncope, syncope, PND, orthopnea or pedal edema.  We do not have an accurate blood pressure today due to technical difficulties, but a radial pressure is in the low 150s systolic.  She states that she does have a nurse that checks it in her place of residence, and it was 134/88 last Thursday.  She states her blood pressure usually is in the 130s systolic.      Again, Ms. Rehman states she is not having any shortness of breath which she had had in the past.  She now does complain of some moderate dizziness which is not lightheadedness.  She is seeing her primary physician for this early next week.      She was unable to get a heart monitor done last week, and so it is pending on Friday.  Once again, her dizziness is described as an imbalance that is episodic.  It is not lightheadedness or presyncope.      We have no recent lipids, though she states she has checked them with Dr. Finney, who manages her dyslipidemia.  The patient was on Pravachol, which has been on hold.  I will  continue to defer that to her primary physician.      She has some persistent pedal edema and has occasionally taken a bit of Lasix for it, presumably through her primary physician.  I have recommended consideration of mild to moderate compression if we can ensure that she has reasonable pedal pulses.  Her exam is limited due to the edema and I cannot palpate them very well.      Once again we have discussed my restriction of her TV watching, as I have asked her not to watch CNN because she becomes quite agitated at news of Harshil Cole.      ASSESSMENT AND PLAN:  A very pleasant 76-year-old lady with multiple cardiac risk factors.  We will continue her current medical regimen, and she will follow up with her primary physician for the dizziness as well as concern that she has of her rash on her feet for the past 2 months.      She does state she will look into some compression stockings.  She has no claudicatory symptoms, of note.      She will continue to follow for her lipids with her primary physician and again follow up blood pressure there.      We will see her back in August or sooner as needed and will communicate the results of her Holter monitor to her before.      Total time is 30 minutes, 25 in coordination of care and counseling.        Outpatient Encounter Prescriptions as of 11/10/2017   Medication Sig Dispense Refill     ACETAMINOPHEN PO Take 500 mg by mouth       triamcinolone (KENALOG) 0.1 % cream Apply topically 2 times daily       biotin (BIOTIN 5000) 5 MG CAPS        ranitidine (ZANTAC) 150 MG capsule Take by mouth 2 times daily       pravastatin (PRAVACHOL) 20 MG tablet Take 20 mg by mouth At Bedtime ON HOLD   1     glipiZIDE (GLUCOTROL XL) 2.5 MG 24 hr tablet Take 2.5 mg by mouth daily  1     WARFARIN SODIUM PO Take 2.5 mg by mouth Take on Mon, Tues, Wed, Thurs, Fri, Sat (6 days a week)       WARFARIN SODIUM PO Take 5 mg by mouth on Sundays       Ibuprofen (ADVIL PO) Take 400 mg by mouth every  8 hours as needed for moderate pain       LORazepam (ATIVAN) 0.5 MG tablet Take 1 tablet (0.5 mg) by mouth every 4 hours as needed for anxiety or other (chest pain) (Patient taking differently: Take 0.5 mg by mouth as needed for anxiety or other (chest pain) ) 12 tablet 0     OXYBUTYNIN CHLORIDE PO Take 5 mg by mouth 2 times daily Clarified as immediate release with [facility]Patient       [DISCONTINUED] zolpidem (AMBIEN) 5 MG tablet Take tablet by mouth 15 minutes prior to sleep, for Sleep Study 1 tablet 0     [DISCONTINUED] losartan (COZAAR) 25 MG tablet Take 1 tablet (25 mg) by mouth daily 30 tablet 11     Facility-Administered Encounter Medications as of 11/10/2017   Medication Dose Route Frequency Provider Last Rate Last Dose     nitroglycerin 100 MCG/ML injection                Again, thank you for allowing me to participate in the care of your patient.      Sincerely,    Dottie Geller MD     [facility]

## 2017-11-10 NOTE — PROGRESS NOTES
DIAGNOSES:      Encounter Diagnosis   Name Primary?     Atrial fibrillation, unspecified type (H) Yes         HPI:  See [id number]        MEDICATIONS:    Current Outpatient Prescriptions   Medication Sig Dispense Refill     ACETAMINOPHEN PO Take 500 mg by mouth       triamcinolone (KENALOG) 0.1 % cream Apply topically 2 times daily       biotin (BIOTIN 5000) 5 MG CAPS        ranitidine (ZANTAC) 150 MG capsule Take by mouth 2 times daily       pravastatin (PRAVACHOL) 20 MG tablet Take 20 mg by mouth At Bedtime ON HOLD   1     glipiZIDE (GLUCOTROL XL) 2.5 MG 24 hr tablet Take 2.5 mg by mouth daily  1     WARFARIN SODIUM PO Take 2.5 mg by mouth Take on Mon, Tues, Wed, Thurs, Fri, Sat (6 days a week)       WARFARIN SODIUM PO Take 5 mg by mouth on Sundays       Ibuprofen (ADVIL PO) Take 400 mg by mouth every 8 hours as needed for moderate pain       LORazepam (ATIVAN) 0.5 MG tablet Take 1 tablet (0.5 mg) by mouth every 4 hours as needed for anxiety or other (chest pain) (Patient taking differently: Take 0.5 mg by mouth as needed for anxiety or other (chest pain) ) 12 tablet 0     OXYBUTYNIN CHLORIDE PO Take 5 mg by mouth 2 times daily Clarified as immediate release with [facility]Patient           ALLERGIES     Allergies   Allergen Reactions     Petroleum Jelly [Petrolatum] Anaphylaxis     Rash and swelling     Shellfish-Derived Products Anaphylaxis     Tongue swelling     Aspirin Swelling     tiongue swelling     Bacitracin      Rash swelling     Coumadin [Warfarin] Swelling     Leg swelling     Darvon [Propoxyphene] Swelling     Throat closes     Dilaudid [Hydromorphone]      Levaquin [Levofloxacin] Swelling     Tongue swelling     Neosporin [Neomycin-Polymyx-Gramicid] Swelling     rash     Oxycodone      Severe itching     Percodan [Oxycodone-Aspirin]      Severe itching     Tramadol      Vicodin [Hydrocodone-Acetaminophen]      Severe itching       Xarelto [Rivaroxaban]      Adhesive Tape Rash     Band aids       Codeine Rash       PAST MEDICAL HISTORY:  Past Medical History:   Diagnosis Date     Anemia     Iron Deficiency anemia     Arrhythmia     Afib     Atrial fibrillation (H)      CAD (coronary artery disease)     non-obstructive     Congestive heart failure (H)      Degenerative disk disease      Fibromyalgia      Gastro-oesophageal reflux disease      Hiatal hernia      History of blood transfusion      Neuropathy      Other chronic pain     neck, low back, legs     Pernicious anemia      Sleep apnea     uses CPAP.     Urinary incontinence      Vitamin D deficiency        PAST SURGICAL HISTORY:  Past Surgical History:   Procedure Laterality Date     APPENDECTOMY       C RAD RESEC TONSIL/PILLARS       CHOLECYSTECTOMY       COLONOSCOPY  3/15/2011     CORONARY ANGIOGRAPHY ADULT ORDER       HEART CATH LEFT HEART CATH  12/30/16    medication management     HYSTERECTOMY TOTAL ABDOMINAL       KNEE SURGERY Left      LAPAROSCOPIC NISSEN FUNDOPLICATION N/A 2/4/2015    Procedure: LAPAROSCOPIC NISSEN FUNDOPLICATION;  Surgeon: Armando Ansari MD;  Location: [facility] OR     ORTHOPEDIC SURGERY      joint replacement     ORTHOPEDIC SURGERY Left     knee replacement     ORTHOPEDIC SURGERY      ulnar transposition     SOFT TISSUE SURGERY Left     lt elbow mass, ulnar transposition       FAMILY HISTORY:  Family History   Problem Relation Age of Onset     Unknown/Adopted No family hx of        SOCIAL HISTORY:  Social History     Social History     Marital status:      Spouse name: N/A     Number of children: N/A     Years of education: N/A     Social History Main Topics     Smoking status: Former Smoker     Types: Cigarettes     Quit date: 9/1/2014     Smokeless tobacco: None     Alcohol use Yes      Comment: socially.     Drug use: None     Sexual activity: Not Asked     Other Topics Concern     None     Social History Narrative       Review of Systems:  Skin:        Eyes:       ENT:       Respiratory:       Cardiovascular:      "  Gastroenterology:      Genitourinary:       Musculoskeletal:       Neurologic:       Psychiatric:       Heme/Lymph/Imm:       Endocrine:         Physical Exam:  Vitals: Ht 1.727 m (5' 8\")  Wt 129.3 kg (285 lb)  BMI 43.33 kg/m2    Constitutional:  cooperative, alert and oriented, well developed, well nourished, in no acute distress morbidly obese      Skin:  warm and dry to the touch, no apparent skin lesions or masses noted        Head:  normocephalic, no masses or lesions        Eyes:  pupils equal and round;conjunctivae and lids unremarkable;sclera white;no xanthalasma        ENT:  no pallor or cyanosis        Neck:  carotid pulses are full and equal bilaterally   JVP not well visualized    Chest:  normal breath sounds, clear to auscultation, normal A-P diameter, normal symmetry, normal respiratory excursion, no use of accessory muscles        Cardiac: no murmurs, gallops or rubs detected irregularly irregular rhythm distant heart sounds              Abdomen:  abdomen soft;BS normoactive;non-tender obese      Vascular: not assessed this visit                                        Extremities and Back:  no deformities, clubbing, cyanosis, erythema observed   bilateral LE edema;pitting;R greater than L;trace          Neurological:  affect appropriate;no gross motor deficits          ASSESSMENT AND PLAN:    See dictation    ORDERS AT TODAY'S VISIT:    Orders Placed This Encounter   Procedures     Follow-Up with Cardiac Advanced Practice Provider           CC  No referring provider defined for this encounter.            "

## 2017-11-11 NOTE — PROGRESS NOTES
REASON FOR VISIT:  Followup visit.      HISTORY OF PRESENT ILLNESS:  Ms. Rehman is a pleasant 74-year-old lady who comes in routine followup.  She has diastolic dysfunction, chronic atrial fibrillation, morbid obesity, fibromyalgia, sleep apnea, type 2 diabetes and dyslipidemia.      She had no obstructive disease on cardiac catheterization in 2016 in the setting of an echocardiogram suggesting distal anteroseptal hypokinesis with a presentation with elevated troponins.  She repeated echocardiogram in May of last year without regional wall motion abnormalities.      She has been doing well since then and has no cardiorespiratory complaints of chest pain, shortness of breath, lightheadedness, presyncope, syncope, PND, orthopnea or pedal edema.  We do not have an accurate blood pressure today due to technical difficulties, but a radial pressure is in the low 150s systolic.  She states that she does have a nurse that checks it in her place of residence, and it was 134/88 last Thursday.  She states her blood pressure usually is in the 130s systolic.      Again, Ms. Rehman states she is not having any shortness of breath which she had had in the past.  She now does complain of some moderate dizziness which is not lightheadedness.  She is seeing her primary physician for this early next week.      She was unable to get a heart monitor done last week, and so it is pending on Friday.  Once again, her dizziness is described as an imbalance that is episodic.  It is not lightheadedness or presyncope.      We have no recent lipids, though she states she has checked them with Dr. Finney, who manages her dyslipidemia.  The patient was on Pravachol, which has been on hold.  I will continue to defer that to her primary physician.      She has some persistent pedal edema and has occasionally taken a bit of Lasix for it, presumably through her primary physician.  I have recommended consideration of mild to moderate compression if  we can ensure that she has reasonable pedal pulses.  Her exam is limited due to the edema and I cannot palpate them very well.      Once again we have discussed my restriction of her TV watching, as I have asked her not to watch CNN because she becomes quite agitated at news of Harshil Cole.      ASSESSMENT AND PLAN:  A very pleasant 76-year-old lady with multiple cardiac risk factors.  We will continue her current medical regimen, and she will follow up with her primary physician for the dizziness as well as concern that she has of her rash on her feet for the past 2 months.      She does state she will look into some compression stockings.  She has no claudicatory symptoms, of note.      She will continue to follow for her lipids with her primary physician and again follow up blood pressure there.      We will see her back in August or sooner as needed and will communicate the results of her Holter monitor to her before.      Total time is 30 minutes, 25 in coordination of care and counseling.      Arabella Geller MD      cc:   [place] [address]         ARABELLA GELLER MD             D: 11/10/2017 17:41   T: 11/10/2017 18:53   MT: [email or web address]      Name:     KRIS SANDERSON   MRN:      [id number]        Account:      [id number]   :      1940           Service Date: 11/10/2017      Document: [id number]

## 2017-11-29 DIAGNOSIS — R07.9 CHEST PAIN: Primary | ICD-10-CM

## 2017-11-30 ENCOUNTER — TELEPHONE (OUTPATIENT)
Dept: SLEEP MEDICINE | Facility: CLINIC | Age: 77
End: 2017-11-30

## 2017-11-30 NOTE — TELEPHONE ENCOUNTER
Patient stated that the hard copy prescription for Ambien says it was sent electronically? She is wondering if what she has on hand is a paper copy of the prescription or if it was already sent to the pharmacy? She also was told that she can only take half of the 5mg Ambien and to not take it before she comes to her appointment. She is needing clarification as soon as possible. She is getting nervous because her sleep study is coming up on Monday, December 4th.    Dottie TAVARES  Central Scheduler

## 2017-12-01 ENCOUNTER — TELEPHONE (OUTPATIENT)
Dept: SLEEP MEDICINE | Facility: CLINIC | Age: 77
End: 2017-12-01

## 2017-12-01 NOTE — TELEPHONE ENCOUNTER
Savanna left message asking about if she needed to take the paper RX for Ambien into the pharmacy or if it was sent via escript.  Wanted to know if she should take it before the study or wait till she arrives there.  She wanted to verify that she take only one half of the 5 mg tablet.  Called Savanna back left message that she should take the paper Rx to pharmacy and have it filled, RX cannot be escripted or faxed.  She needs to wait until she is at the sleep study, and the techs have all equipment setup with her before taking the pill.  Explained that it could affect her quickly and they need her awake to prepare her for the testing.  Explained she could take one half tablet as from other physician orders for Ambien she was prescribed one half 5mg nightly.  Left our nurseline number if she had any other questions.

## 2018-01-04 ENCOUNTER — HOSPITAL ENCOUNTER (EMERGENCY)
Facility: CLINIC | Age: 78
Discharge: HOME | End: 2018-01-05
Attending: EMERGENCY MEDICINE | Admitting: EMERGENCY MEDICINE
Payer: MEDICARE

## 2018-01-04 DIAGNOSIS — Z79.01 WARFARIN ANTICOAGULATION: ICD-10-CM

## 2018-01-04 DIAGNOSIS — R11.2 NON-INTRACTABLE VOMITING WITH NAUSEA, UNSPECIFIED VOMITING TYPE: ICD-10-CM

## 2018-01-04 DIAGNOSIS — R51.9 ACUTE NONINTRACTABLE HEADACHE, UNSPECIFIED HEADACHE TYPE: ICD-10-CM

## 2018-01-04 LAB
BASOPHILS # BLD AUTO: 0.1 10E9/L (ref 0–0.2)
BASOPHILS NFR BLD AUTO: 0.7 %
CREAT BLD-MCNC: 0.9 MG/DL (ref 0.52–1.04)
DIFFERENTIAL METHOD BLD: NORMAL
EOSINOPHIL # BLD AUTO: 0.6 10E9/L (ref 0–0.7)
EOSINOPHIL NFR BLD AUTO: 7.7 %
ERYTHROCYTE [DISTWIDTH] IN BLOOD BY AUTOMATED COUNT: 13.8 % (ref 10–15)
GFR SERPL CREATININE-BSD FRML MDRD: 61 ML/MIN/1.7M2
HCT VFR BLD AUTO: 44.8 % (ref 35–47)
HGB BLD-MCNC: 15 G/DL (ref 11.7–15.7)
IMM GRANULOCYTES # BLD: 0 10E9/L (ref 0–0.4)
IMM GRANULOCYTES NFR BLD: 0.5 %
LYMPHOCYTES # BLD AUTO: 2.4 10E9/L (ref 0.8–5.3)
LYMPHOCYTES NFR BLD AUTO: 29 %
MCH RBC QN AUTO: 30.1 PG (ref 26.5–33)
MCHC RBC AUTO-ENTMCNC: 33.5 G/DL (ref 31.5–36.5)
MCV RBC AUTO: 90 FL (ref 78–100)
MONOCYTES # BLD AUTO: 1.1 10E9/L (ref 0–1.3)
MONOCYTES NFR BLD AUTO: 13.6 %
NEUTROPHILS # BLD AUTO: 4 10E9/L (ref 1.6–8.3)
NEUTROPHILS NFR BLD AUTO: 48.5 %
NRBC # BLD AUTO: 0 10*3/UL
NRBC BLD AUTO-RTO: 0 /100
PLATELET # BLD AUTO: 229 10E9/L (ref 150–450)
RBC # BLD AUTO: 4.98 10E12/L (ref 3.8–5.2)
WBC # BLD AUTO: 8.2 10E9/L (ref 4–11)

## 2018-01-04 PROCEDURE — 96375 TX/PRO/DX INJ NEW DRUG ADDON: CPT

## 2018-01-04 PROCEDURE — 96374 THER/PROPH/DIAG INJ IV PUSH: CPT | Mod: 59

## 2018-01-04 PROCEDURE — 84484 ASSAY OF TROPONIN QUANT: CPT | Performed by: EMERGENCY MEDICINE

## 2018-01-04 PROCEDURE — 25000128 H RX IP 250 OP 636: Performed by: EMERGENCY MEDICINE

## 2018-01-04 PROCEDURE — 96361 HYDRATE IV INFUSION ADD-ON: CPT

## 2018-01-04 PROCEDURE — 85610 PROTHROMBIN TIME: CPT | Performed by: EMERGENCY MEDICINE

## 2018-01-04 PROCEDURE — 82565 ASSAY OF CREATININE: CPT | Mod: 91

## 2018-01-04 PROCEDURE — 99285 EMERGENCY DEPT VISIT HI MDM: CPT | Mod: 25

## 2018-01-04 PROCEDURE — 80048 BASIC METABOLIC PNL TOTAL CA: CPT | Performed by: EMERGENCY MEDICINE

## 2018-01-04 PROCEDURE — 85025 COMPLETE CBC W/AUTO DIFF WBC: CPT | Performed by: EMERGENCY MEDICINE

## 2018-01-04 RX ORDER — ONDANSETRON 2 MG/ML
4 INJECTION INTRAMUSCULAR; INTRAVENOUS EVERY 30 MIN PRN
Status: DISCONTINUED | OUTPATIENT
Start: 2018-01-04 | End: 2018-01-05 | Stop reason: HOSPADM

## 2018-01-04 RX ORDER — LIDOCAINE 40 MG/G
CREAM TOPICAL
Status: DISCONTINUED | OUTPATIENT
Start: 2018-01-04 | End: 2018-01-05 | Stop reason: HOSPADM

## 2018-01-04 RX ORDER — HYDRALAZINE HYDROCHLORIDE 20 MG/ML
10 INJECTION INTRAMUSCULAR; INTRAVENOUS ONCE
Status: COMPLETED | OUTPATIENT
Start: 2018-01-04 | End: 2018-01-04

## 2018-01-04 RX ADMIN — ONDANSETRON 4 MG: 2 INJECTION INTRAMUSCULAR; INTRAVENOUS at 23:45

## 2018-01-04 RX ADMIN — HYDRALAZINE HYDROCHLORIDE 10 MG: 20 INJECTION INTRAMUSCULAR; INTRAVENOUS at 23:45

## 2018-01-04 RX ADMIN — SODIUM CHLORIDE 1000 ML: 9 INJECTION, SOLUTION INTRAVENOUS at 23:45

## 2018-01-04 ASSESSMENT — ENCOUNTER SYMPTOMS
FEVER: 0
VOMITING: 0
NAUSEA: 1
HEADACHES: 1

## 2018-01-04 NOTE — ED AVS SNAPSHOT
[facility] Emergency Department    [address]    [facility] [address]    Phone:  [phone]    Fax:  [phone]                                       Savanna Rehman   MRN: [id number]    Department:  [facility] Emergency Department   Date of Visit:  1/4/2018           Patient Information     Date Of Birth          1940        Your diagnoses for this visit were:     Acute nonintractable headache, unspecified headache type     Non-intractable vomiting with nausea, unspecified vomiting type     Warfarin anticoagulation        You were seen by Chinmay Serrano MD.      Follow-up Information     Follow up with Hilary Finney MD.    Specialty:  Family Practice    Why:  as needed    Contact information:    [facility]  [id number]  [facility] [address]  [phone]          Discharge Instructions       Discharge Instructions  Headache    You were seen today for a headache. Headaches may be caused by many different things such as muscle tension, sinus inflammation, anxiety and stress, having too little sleep, too much alcohol, some medical conditions or injury. You may have a migraine, which is caused by changes in the blood vessels in your head.  At this time your provider does not find that your headache is a sign of anything dangerous or life-threatening.  However, sometimes the signs of serious illness do not show up right away.      Generally, every Emergency Department visit should have a follow-up clinic visit with either a primary or a specialty clinic/provider. Please follow-up as instructed by your emergency provider today.    Return to the Emergency Department if:    You get a new fever of 100.4 F or higher.    Your headache gets much worse.    You get a stiff neck with your headache.    You get a new headache that is significantly different or worse than headaches you have had before.    You are vomiting (throwing up) and cannot keep food or  water down.    You have blurry or double vision or other problems with your eyes.    You have a new weakness on one side of your body.    You have difficulty with balance which is new.    You or your family thinks you are confused.    You have a seizure.    What can I do to help myself?    Pain medications - You may take a pain medication such as Tylenol  (acetaminophen), Advil , Motrin  (ibuprofen) or Aleve  (naproxen).    Take a pain reliever as soon as you notice symptoms.  Starting medications as soon as you start to have symptoms may lessen the amount of pain you have.    Relaxing in a quiet, dark room may help.    Get enough sleep and eat meals regularly.    You may need to watch for certain foods or other things which may trigger your headaches.  Keeping a journal of your headaches and possible triggers may help you and your primary provider to identify things which you should avoid which may be causing your headaches.  If you were given a prescription for medicine here today, be sure to read all of the information (including the package insert) that comes with your prescription.  This will include important information about the medicine, its side effects, and any warnings that you need to know about.  The pharmacist who fills the prescription can provide more information and answer questions you may have about the medicine.  If you have questions or concerns that the pharmacist cannot address, please call or return to the Emergency Department.   Remember that you can always come back to the Emergency Department if you are not able to see your regular provider in the amount of time listed above, if you get any new symptoms, or if there is anything that worries you.      Future Appointments        Provider Department Dept Phone Center    1/17/2018 8:30 PM BED 2  SLEEP [facility] [phone] [facility]    1/26/2018 4:30 PM Warner Mims MD [facility]  [phone]  SLEEP      24 Hour Appointment Hotline       To make an appointment at any [facility], call [phone] ([phone]). If you don't have a family doctor or clinic, we will help you find one. [facility] are conveniently located to serve the needs of you and your family.             Review of your medicines      START taking        Dose / Directions Last dose taken    ondansetron 4 MG ODT tab   Commonly known as:  ZOFRAN ODT   Dose:  4 mg   Quantity:  8 tablet        Take 1 tablet (4 mg) by mouth every 6 hours as needed for nausea   Refills:  0          Our records show that you are taking the medicines listed below. If these are incorrect, please call your family doctor or clinic.        Dose / Directions Last dose taken    ACETAMINOPHEN PO   Dose:  500 mg        Take 500 mg by mouth   Refills:  0        ADVIL PO   Dose:  400 mg        Take 400 mg by mouth every 8 hours as needed for moderate pain   Refills:  0        BIOTIN 5000 5 MG Caps   Generic drug:  biotin        Refills:  0        glipiZIDE 2.5 MG 24 hr tablet   Commonly known as:  GLUCOTROL XL   Dose:  2.5 mg        Take 2.5 mg by mouth daily   Refills:  1        LORazepam 0.5 MG tablet   Commonly known as:  ATIVAN   Dose:  0.5 mg   Quantity:  12 tablet        Take 1 tablet (0.5 mg) by mouth every 4 hours as needed for anxiety or other (chest pain)   Refills:  0        OXYBUTYNIN CHLORIDE PO   Dose:  5 mg        Take 5 mg by mouth 2 times daily Clarified as immediate release with [facility]./Patient   Refills:  0        pravastatin 20 MG tablet   Commonly known as:  PRAVACHOL   Dose:  20 mg        Take 20 mg by mouth At Bedtime ON HOLD   Refills:  1        ranitidine 150 MG capsule   Commonly known as:  ZANTAC        Take by mouth 2 times daily   Refills:  0        triamcinolone 0.1 % cream   Commonly known as:  KENALOG        Apply topically 2 times daily   Refills:  0        * WARFARIN SODIUM PO   Dose:  2.5 mg        Take  2.5 mg by mouth Take on Mon, Tues, Wed, Thurs, Fri, Sat (6 days a week)   Refills:  0        * WARFARIN SODIUM PO   Dose:  5 mg        Take 5 mg by mouth on Sundays   Refills:  0        * Notice:  This list has 2 medication(s) that are the same as other medications prescribed for you. Read the directions carefully, and ask your doctor or other care provider to review them with you.            Prescriptions were sent or printed at these locations (1 Prescription)                   Other Prescriptions                Printed at Department/Unit printer (1 of 1)         ondansetron (ZOFRAN ODT) 4 MG ODT tab                Procedures and tests performed during your visit     Basic metabolic panel    CBC with platelets differential    CT Head w/o Contrast    Cardiac Continuous Monitoring    Creatinine POCT    EKG 12 lead    Head angiogram CT w & w/o contrast    INR    ISTAT creatinine nursing POCT    Peripheral IV catheter    Pulse oximetry nursing    Troponin I      Orders Needing Specimen Collection     None      Pending Results     Date and Time Order Name Status Description    1/5/2018 0012 EKG 12 lead Preliminary     1/4/2018 2339 Head angiogram CT w & w/o contrast In process     1/4/2018 2339 CT Head w/o Contrast In process             Pending Culture Results     No orders found for last 3 day(s).            Pending Results Instructions     If you had any lab results that were not finalized at the time of your Discharge, you can call the ED Lab Result RN at [phone]. You will be contacted by this team for any positive Lab results or changes in treatment. The nurses are available 7 days a week from 10A to 6:30P.  You can leave a message 24 hours per day and they will return your call.        Test Results From Your Hospital Stay        1/4/2018 11:47 PM      Component Results     Component Value Ref Range & Units Status    WBC 8.2 4.0 - 11.0 10e9/L Final    RBC Count 4.98 3.8 - 5.2 10e12/L Final    Hemoglobin 15.0  11.7 - 15.7 g/dL Final    Hematocrit 44.8 35.0 - 47.0 % Final    MCV 90 78 - 100 fl Final    MCH 30.1 26.5 - 33.0 pg Final    MCHC 33.5 31.5 - 36.5 g/dL Final    RDW 13.8 10.0 - 15.0 % Final    Platelet Count 229 150 - 450 10e9/L Final    Diff Method Automated Method  Final    % Neutrophils 48.5 % Final    % Lymphocytes 29.0 % Final    % Monocytes 13.6 % Final    % Eosinophils 7.7 % Final    % Basophils 0.7 % Final    % Immature Granulocytes 0.5 % Final    Nucleated RBCs 0 0 /100 Final    Absolute Neutrophil 4.0 1.6 - 8.3 10e9/L Final    Absolute Lymphocytes 2.4 0.8 - 5.3 10e9/L Final    Absolute Monocytes 1.1 0.0 - 1.3 10e9/L Final    Absolute Eosinophils 0.6 0.0 - 0.7 10e9/L Final    Absolute Basophils 0.1 0.0 - 0.2 10e9/L Final    Abs Immature Granulocytes 0.0 0 - 0.4 10e9/L Final    Absolute Nucleated RBC 0.0  Final         1/5/2018 12:02 AM      Component Results     Component Value Ref Range & Units Status    INR 2.06 (H) 0.86 - 1.14 Final         1/5/2018 12:00 AM      Component Results     Component Value Ref Range & Units Status    Sodium 136 133 - 144 mmol/L Final    Potassium 4.6 3.4 - 5.3 mmol/L Final    Chloride 102 94 - 109 mmol/L Final    Carbon Dioxide 27 20 - 32 mmol/L Final    Anion Gap 7 3 - 14 mmol/L Final    Glucose 155 (H) 70 - 99 mg/dL Final    Urea Nitrogen 18 7 - 30 mg/dL Final    Creatinine 0.85 0.52 - 1.04 mg/dL Final    GFR Estimate 65 >60 mL/min/1.7m2 Final    Non  GFR Calc    GFR Estimate If Black 79 >60 mL/min/1.7m2 Final    African American GFR Calc    Calcium 9.3 8.5 - 10.1 mg/dL Final         1/5/2018 12:45 AM      Result not yet available     Exam Ended         1/5/2018 12:45 AM      Result not yet available     Exam Ended         1/4/2018 11:41 PM      Component Results     Component Value Ref Range & Units Status    Creatinine 0.9 0.52 - 1.04 mg/dL Final    GFR Estimate 61 >60 mL/min/1.7m2 Final    GFR Estimate If Black 74 >60 mL/min/1.7m2 Final         1/5/2018  12:44 AM      Component Results     Component Value Ref Range & Units Status    Troponin I ES <0.015 0.000 - 0.045 ug/L Final    The 99th percentile for upper reference range is 0.045 ug/L.  Troponin values   in the range of 0.045 - 0.120 ug/L may be associated with risks of adverse   clinical events.                  Clinical Quality Measure: Blood Pressure Screening     Your blood pressure was checked while you were in the emergency department today. The last reading we obtained was  BP: 144/81 . Please read the guidelines below about what these numbers mean and what you should do about them.  If your systolic blood pressure (the top number) is less than 120 and your diastolic blood pressure (the bottom number) is less than 80, then your blood pressure is normal. There is nothing more that you need to do about it.  If your systolic blood pressure (the top number) is 120-139 or your diastolic blood pressure (the bottom number) is 80-89, your blood pressure may be higher than it should be. You should have your blood pressure rechecked within a year by a primary care provider.  If your systolic blood pressure (the top number) is 140 or greater or your diastolic blood pressure (the bottom number) is 90 or greater, you may have high blood pressure. High blood pressure is treatable, but if left untreated over time it can put you at risk for heart attack, stroke, or kidney failure. You should have your blood pressure rechecked by a primary care provider within the next 4 weeks.  If your provider in the emergency department today gave you specific instructions to follow-up with your doctor or provider even sooner than that, you should follow that instruction and not wait for up to 4 weeks for your follow-up visit.        Thank you for choosing [facility]       Thank you for choosing [facility] for your care. Our goal is always to provide you with excellent care. Hearing back from our patients is one way we can continue to  "improve our services. Please take a few minutes to complete the written survey that you may receive in the mail after you visit with us. Thank you!        General [employer] Information     [employer] lets you send messages to your doctor, view your test results, renew your prescriptions, schedule appointments and more. To sign up, go to [email or web address] . Click on \"Log in\" on the left side of the screen, which will take you to the Welcome page. Then click on \"Sign up Now\" on the right side of the page.     You will be asked to enter the access code listed below, as well as some personal information. Please follow the directions to create your username and password.     Your access code is: [email or web address]  Expires: 2018  2:14 AM     Your access code will  in 90 days. If you need help or a new code, please call your [facility] clinic or [phone].        Care EveryWhere ID     This is your Care EveryWhere ID. This could be used by other organizations to access your [facility] medical records  [id number]        Equal Access to Services     [facility] Center[facility] : Hadrobin Read, wakurtis alex, qadenice ansari, hanny patton. So [facility] [phone].    ATENCIÓN: Si habla español, tiene a maier disposición servicios gratuitos de asistencia lingüística. Erika al [phone].    We comply with applicable federal civil rights laws and [place] laws. We do not discriminate on the basis of race, color, national origin, age, disability, sex, sexual orientation, or gender identity.            After Visit Summary       This is your record. Keep this with you and show to your community pharmacist(s) and doctor(s) at your next visit.                  "

## 2018-01-05 ENCOUNTER — APPOINTMENT (OUTPATIENT)
Dept: CT IMAGING | Facility: CLINIC | Age: 78
End: 2018-01-05
Attending: EMERGENCY MEDICINE
Payer: MEDICARE

## 2018-01-05 VITALS
RESPIRATION RATE: 16 BRPM | DIASTOLIC BLOOD PRESSURE: 94 MMHG | HEART RATE: 68 BPM | SYSTOLIC BLOOD PRESSURE: 158 MMHG | OXYGEN SATURATION: 99 % | TEMPERATURE: 98.7 F

## 2018-01-05 LAB
ANION GAP SERPL CALCULATED.3IONS-SCNC: 7 MMOL/L (ref 3–14)
BUN SERPL-MCNC: 18 MG/DL (ref 7–30)
CALCIUM SERPL-MCNC: 9.3 MG/DL (ref 8.5–10.1)
CHLORIDE SERPL-SCNC: 102 MMOL/L (ref 94–109)
CO2 SERPL-SCNC: 27 MMOL/L (ref 20–32)
CREAT SERPL-MCNC: 0.85 MG/DL (ref 0.52–1.04)
GFR SERPL CREATININE-BSD FRML MDRD: 65 ML/MIN/1.7M2
GLUCOSE SERPL-MCNC: 155 MG/DL (ref 70–99)
INR PPP: 2.06 (ref 0.86–1.14)
INTERPRETATION ECG - MUSE: NORMAL
POTASSIUM SERPL-SCNC: 4.6 MMOL/L (ref 3.4–5.3)
SODIUM SERPL-SCNC: 136 MMOL/L (ref 133–144)
TROPONIN I SERPL-MCNC: <0.015 UG/L (ref 0–0.04)

## 2018-01-05 PROCEDURE — 93005 ELECTROCARDIOGRAM TRACING: CPT

## 2018-01-05 PROCEDURE — 96376 TX/PRO/DX INJ SAME DRUG ADON: CPT

## 2018-01-05 PROCEDURE — 25000128 H RX IP 250 OP 636: Performed by: EMERGENCY MEDICINE

## 2018-01-05 PROCEDURE — 70496 CT ANGIOGRAPHY HEAD: CPT

## 2018-01-05 PROCEDURE — 96375 TX/PRO/DX INJ NEW DRUG ADDON: CPT

## 2018-01-05 PROCEDURE — 70450 CT HEAD/BRAIN W/O DYE: CPT | Mod: XS

## 2018-01-05 PROCEDURE — 96374 THER/PROPH/DIAG INJ IV PUSH: CPT | Mod: 59

## 2018-01-05 RX ORDER — IOPAMIDOL 755 MG/ML
500 INJECTION, SOLUTION INTRAVASCULAR ONCE
Status: COMPLETED | OUTPATIENT
Start: 2018-01-05 | End: 2018-01-05

## 2018-01-05 RX ORDER — MORPHINE SULFATE 4 MG/ML
4 INJECTION, SOLUTION INTRAMUSCULAR; INTRAVENOUS ONCE
Status: COMPLETED | OUTPATIENT
Start: 2018-01-05 | End: 2018-01-05

## 2018-01-05 RX ORDER — METOCLOPRAMIDE HYDROCHLORIDE 5 MG/ML
10 INJECTION INTRAMUSCULAR; INTRAVENOUS ONCE
Status: COMPLETED | OUTPATIENT
Start: 2018-01-05 | End: 2018-01-05

## 2018-01-05 RX ORDER — ONDANSETRON 4 MG/1
4 TABLET, ORALLY DISINTEGRATING ORAL EVERY 6 HOURS PRN
Qty: 8 TABLET | Refills: 0 | Status: SHIPPED | OUTPATIENT
Start: 2018-01-05 | End: 2018-01-08

## 2018-01-05 RX ADMIN — METOCLOPRAMIDE HYDROCHLORIDE 10 MG: 5 INJECTION INTRAMUSCULAR; INTRAVENOUS at 00:50

## 2018-01-05 RX ADMIN — IOPAMIDOL 70 ML: 755 INJECTION, SOLUTION INTRAVENOUS at 01:06

## 2018-01-05 RX ADMIN — SODIUM CHLORIDE 80 ML: 9 INJECTION, SOLUTION INTRAVENOUS at 01:07

## 2018-01-05 RX ADMIN — ONDANSETRON 4 MG: 2 INJECTION INTRAMUSCULAR; INTRAVENOUS at 00:20

## 2018-01-05 RX ADMIN — MORPHINE SULFATE 4 MG: 4 INJECTION INTRAVENOUS at 00:21

## 2018-01-05 NOTE — ED NOTES
"Patient watching tv and then developed sudden headache across forehead and temples started at 2130. On warfarin for history of A-fib, stated \"worst headache of my life.\" ABCs intact, gcs 15, AA&Ox3.  "

## 2018-01-05 NOTE — ED PROVIDER NOTES
History     Chief Complaint:  Headache     HPI   Savanna Rehman is a 77-year-old female with a history of atrial fibrillation and fibromyalgia, among others, who is on warfarin for AF and presents for evaluation of a headache that started suddenly at 2130.  The patient stated to the nursing staff that this is the worst headache of her life. The patient reports that the headache is primarily in the front of her head, especially in the temples.  She states that she has associated nausea.  She denies any vomiting.    Allergies:  Aspirin  Bacitracin  Coumadin [Warfarin]  Darvon [Propoxyphene]  Dilaudid [Hydromorphone]  Levaquin [Levofloxacin]  Neosporin [Neomycin-Polymyx-Gramicid]  Oxycodone  Percodan [Oxycodone-Aspirin]  Tramadol  Vicodin [Hydrocodone-Acetaminophen]  Xarelto [Rivaroxaban]  Adhesive Tape  Codeine     Medications:    Warfarin   Pravastatin   Ativan   Oxybutynin chloride   Glipizide   Ranitidine   Biotin      Past Medical History:    Anemia  Atrial fibrillation  CAD  Chronic pain  CHF  DDD  Fibromyalgia  GERD  Hiatal hernia  Neuropathy  Pernicious anemia  Sleep apnea  Urinary incontinence    Past Surgical History:    Appendectomy   Cholecystectomy   Coronary angiography   Heart cath left   Hysterectomy   Knee replacement   Nissen Fundoplication   Tonsillectomy   Transposition ulnar nerve     Family History:    History reviewed. No pertinent family history.     Social History:  Marital Status:    Presents to the ED with daughter   Tobacco Use: Former smoker, quit 2014   Alcohol Use: Socially   PCP: Hilary Finney MD      Review of Systems   Constitutional: Negative for fever.   Gastrointestinal: Positive for nausea. Negative for vomiting.   Neurological: Positive for headaches.   All other systems reviewed and are negative.    Physical Exam   Patient Vitals for the past 24 hrs:   BP Temp Temp src Pulse Heart Rate Resp SpO2   01/05/18 0200 - - - - 77 13 94 %   01/05/18 0030 144/81 - - - 81 - 94 %    01/05/18 0019 (!) 157/93 - - - - - -   01/05/18 0015 (!) 157/93 - - - 71 - 99 %   01/05/18 0000 163/86 - - - 67 - 96 %   01/04/18 2345 (!) 158/122 - - - 69 - 92 %   01/04/18 2330 (!) 193/109 - - - - - 93 %   01/04/18 2324 (!) 206/116 98.7  F (37.1  C) Oral 68 68 18 94 %     Physical Exam  Nursing note and vitals reviewed.  Constitutional: Cooperative. Uncomfortable-appearing.   HENT:   Mouth/Throat: Mucous membranes are normal. No neck rigidity  Eyes: Pupils are equal, round, and reactive to light. EOMI.   Cardiovascular: Irregularly irregular rhythm, normal rate. Normal heart sounds.  No murmur.  Pulmonary/Chest: Effort normal and breath sounds normal. No respiratory distress. No wheezes. No rales.   Abdominal: Soft. Normal appearance and bowel sounds are normal. No distension. There is no tenderness. There is no rigidity and no guarding.   Neurological: Alert. Cranial nerves II-XII intact. Oriented x4. CN 2-12 intact.  Strength normal.   Skin: Skin is warm and dry. No rash noted.   Psychiatric: Normal mood and affect.     Emergency Department Course   ECG:  @ 0015  Indication: Chest pain   Vent. Rate 69 bpm. AK interval * ms. QRS duration 88 ms. QT/QTc 546/585 ms. P-R-T axis * 40 31.   Atrial fibrillation. Possible Anterior infarct, age undetermined.    Read @ 0018 by Dr. Serrano.     Imaging:  Head CT/CTA  1.  Noncontrast head CT demonstrates no hemorrhage, mass/mass-effect or CT evidence of acute infarct.  MRI is more sensitive in the assessment of acute ischemia/infarction.  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  2.  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  There is fetal origin to the left PCA.  Otherwise negative.    Neck CTA  No significant stenosis in the neck vessels based on NASCET ET criteria.  No evidence for dissection.    Radiographic findings  were communicated with the patient who voiced understanding of the findings.    Laboratory:  Blood:  CBC:  WBC 8.2, HGB 15.0, , otherwise WNL  BMP: Glc 155, otherwise WNL (Creatinine 0.85)    INR: 2.06    Troponin I: <0.015 (WNL)      (2337) Creatinine POCT: 0.9     Interventions:  (2345) Normal Saline, 1 liter, IV bolus   (2345) Zofran, 4 mg, IV injection  (2345) Hydralazine, 10 mg, IV   (0020) Zofran, 4 mg, IV injection   (0021) Morphine, 4 mg, IV injection   (0050) Reglan, 10 mg, IV     Emergency Department Course:  Nursing notes and vitals reviewed.    (2331) I entered the room with my scribe, obtained the history, and performed an exam of the patient as documented above.    EKG was done, interpretation as above.     A peripheral IV was established. Blood was drawn from the patient. This was sent for laboratory testing, findings above.      The patient was sent for a CT/CTA of the head and neck while in the emergency department, findings above.      (0207) I went to update the patient on the results of her labs and imaging.      Findings and plan explained to the patient. Patient discharged home with instructions regarding supportive care, medications, and reasons to return. The importance of close follow-up was reviewed. The patient was prescribed Zofran.      Impression & Plan    Medical Decision Making:  Savanna Rehman is a 77-year-old female with the above history who presents with sudden onset of headache.  She is anticoagulated on warfarin.  INR level is appropriate.  Concern, given the clinical history, for subarachnoid hemorrhage.  CT scan was obtained which shows no evidence of mass, lesion, bleed, aneurysm, infarct, or other abnormality.  There are some stenoses to the blood vessels as noted in the read above.  Vital signs show that the patient was initially hypertensive but after IV hydralazine her blood pressures are now in the 140s over 80s.  She has never been tachycardic.  She has had some  vomiting, although the x-ray technician describes some forceful retching prior to her actually throwing up.  She has no neck rigidity, fevers, or concern clinically for meningitis. We discussed LP for CSF analysis, she is declining.   The sudden onset and lack of elevated white count would also speak against an infectious etiology.  Her allergies are markedly limiting to treat her more aggressively for her pain.  She is still uncomfortable with a headache here, but at this time I do not feel there is anything more we can offer.  We did discuss inpatient observation and admission status, but after discussion of the benefits and cost of this she would like to go home and rest and see how she does.  She has declined steroids for concern that it will elevate her blood sugars.  There does not appear to be an emergent cause or life-threatening cause of her headache at this time.    Diagnosis:    ICD-10-CM   1. Acute nonintractable headache, unspecified headache type R51   2. Non-intractable vomiting with nausea, unspecified vomiting type R11.2   3. Warfarin anticoagulation Z79.01     Disposition:  discharged to home    Discharge Medications:  New Prescriptions    ONDANSETRON (ZOFRAN ODT) 4 MG ODT TAB    Take 1 tablet (4 mg) by mouth every 6 hours as needed for nausea           [facility] EMERGENCY DEPARTMENT      Scribe disclosure:  I, Isreal Mcginnis, am serving as a scribe on 1/4/2018 at 11:31 PM to personally document services performed by Dr. Serrano based on my observations and the provider's statements to me.                Chinmay Serrano MD  01/05/18 0518

## 2018-01-10 ENCOUNTER — TRANSFERRED RECORDS (OUTPATIENT)
Dept: HEALTH INFORMATION MANAGEMENT | Facility: CLINIC | Age: 78
End: 2018-01-10

## 2018-01-14 ENCOUNTER — HOSPITAL ENCOUNTER (EMERGENCY)
Facility: CLINIC | Age: 78
Discharge: HOME | End: 2018-01-14
Attending: EMERGENCY MEDICINE | Admitting: EMERGENCY MEDICINE
Payer: MEDICARE

## 2018-01-14 VITALS
SYSTOLIC BLOOD PRESSURE: 154 MMHG | HEART RATE: 80 BPM | RESPIRATION RATE: 20 BRPM | TEMPERATURE: 97 F | DIASTOLIC BLOOD PRESSURE: 105 MMHG | OXYGEN SATURATION: 96 %

## 2018-01-14 DIAGNOSIS — N30.00 ACUTE CYSTITIS WITHOUT HEMATURIA: ICD-10-CM

## 2018-01-14 LAB
ALBUMIN UR-MCNC: NEGATIVE MG/DL
ANION GAP SERPL CALCULATED.3IONS-SCNC: 9 MMOL/L (ref 3–14)
APPEARANCE UR: CLEAR
BACTERIA #/AREA URNS HPF: ABNORMAL /HPF
BASOPHILS # BLD AUTO: 0.1 10E9/L (ref 0–0.2)
BASOPHILS NFR BLD AUTO: 1.2 %
BILIRUB UR QL STRIP: NEGATIVE
BUN SERPL-MCNC: 15 MG/DL (ref 7–30)
CALCIUM SERPL-MCNC: 8.6 MG/DL (ref 8.5–10.1)
CHLORIDE SERPL-SCNC: 103 MMOL/L (ref 94–109)
CO2 SERPL-SCNC: 25 MMOL/L (ref 20–32)
COLOR UR AUTO: YELLOW
CREAT SERPL-MCNC: 0.87 MG/DL (ref 0.52–1.04)
DIFFERENTIAL METHOD BLD: NORMAL
EOSINOPHIL # BLD AUTO: 0.6 10E9/L (ref 0–0.7)
EOSINOPHIL NFR BLD AUTO: 9.3 %
ERYTHROCYTE [DISTWIDTH] IN BLOOD BY AUTOMATED COUNT: 13.3 % (ref 10–15)
GFR SERPL CREATININE-BSD FRML MDRD: 64 ML/MIN/1.7M2
GLUCOSE SERPL-MCNC: 164 MG/DL (ref 70–99)
GLUCOSE UR STRIP-MCNC: NEGATIVE MG/DL
HCT VFR BLD AUTO: 46 % (ref 35–47)
HGB BLD-MCNC: 15.4 G/DL (ref 11.7–15.7)
HGB UR QL STRIP: ABNORMAL
HYALINE CASTS #/AREA URNS LPF: 10 /LPF (ref 0–2)
IMM GRANULOCYTES # BLD: 0 10E9/L (ref 0–0.4)
IMM GRANULOCYTES NFR BLD: 0.3 %
INR PPP: 1.94 (ref 0.86–1.14)
KETONES UR STRIP-MCNC: NEGATIVE MG/DL
LEUKOCYTE ESTERASE UR QL STRIP: NEGATIVE
LYMPHOCYTES # BLD AUTO: 1.9 10E9/L (ref 0.8–5.3)
LYMPHOCYTES NFR BLD AUTO: 28.2 %
MCH RBC QN AUTO: 29.7 PG (ref 26.5–33)
MCHC RBC AUTO-ENTMCNC: 33.5 G/DL (ref 31.5–36.5)
MCV RBC AUTO: 89 FL (ref 78–100)
MONOCYTES # BLD AUTO: 0.8 10E9/L (ref 0–1.3)
MONOCYTES NFR BLD AUTO: 11.5 %
MUCOUS THREADS #/AREA URNS LPF: PRESENT /LPF
NEUTROPHILS # BLD AUTO: 3.3 10E9/L (ref 1.6–8.3)
NEUTROPHILS NFR BLD AUTO: 49.5 %
NITRATE UR QL: POSITIVE
NRBC # BLD AUTO: 0 10*3/UL
NRBC BLD AUTO-RTO: 0 /100
PH UR STRIP: 5 PH (ref 5–7)
PLATELET # BLD AUTO: 282 10E9/L (ref 150–450)
POTASSIUM SERPL-SCNC: 4 MMOL/L (ref 3.4–5.3)
RBC # BLD AUTO: 5.18 10E12/L (ref 3.8–5.2)
RBC #/AREA URNS AUTO: <1 /HPF (ref 0–2)
SODIUM SERPL-SCNC: 137 MMOL/L (ref 133–144)
SOURCE: ABNORMAL
SP GR UR STRIP: 1.02 (ref 1–1.03)
SQUAMOUS #/AREA URNS AUTO: 3 /HPF (ref 0–1)
UROBILINOGEN UR STRIP-MCNC: 0 MG/DL (ref 0–2)
WBC # BLD AUTO: 6.7 10E9/L (ref 4–11)
WBC #/AREA URNS AUTO: 2 /HPF (ref 0–2)

## 2018-01-14 PROCEDURE — 85610 PROTHROMBIN TIME: CPT | Performed by: EMERGENCY MEDICINE

## 2018-01-14 PROCEDURE — 25000128 H RX IP 250 OP 636: Performed by: EMERGENCY MEDICINE

## 2018-01-14 PROCEDURE — 96365 THER/PROPH/DIAG IV INF INIT: CPT

## 2018-01-14 PROCEDURE — 85025 COMPLETE CBC W/AUTO DIFF WBC: CPT | Performed by: EMERGENCY MEDICINE

## 2018-01-14 PROCEDURE — 80048 BASIC METABOLIC PNL TOTAL CA: CPT | Performed by: EMERGENCY MEDICINE

## 2018-01-14 PROCEDURE — 99284 EMERGENCY DEPT VISIT MOD MDM: CPT | Mod: 25

## 2018-01-14 PROCEDURE — 81001 URINALYSIS AUTO W/SCOPE: CPT | Performed by: EMERGENCY MEDICINE

## 2018-01-14 RX ORDER — CEFTRIAXONE SODIUM 1 G/50ML
1 INJECTION, SOLUTION INTRAVENOUS ONCE
Status: COMPLETED | OUTPATIENT
Start: 2018-01-14 | End: 2018-01-14

## 2018-01-14 RX ORDER — NITROFURANTOIN 25; 75 MG/1; MG/1
100 CAPSULE ORAL 2 TIMES DAILY
Qty: 20 CAPSULE | Refills: 0 | Status: SHIPPED | OUTPATIENT
Start: 2018-01-14 | End: 2018-01-24

## 2018-01-14 RX ORDER — NITROFURANTOIN 25; 75 MG/1; MG/1
100 CAPSULE ORAL ONCE
Status: DISCONTINUED | OUTPATIENT
Start: 2018-01-14 | End: 2018-01-14

## 2018-01-14 RX ADMIN — CEFTRIAXONE SODIUM 1 G: 1 INJECTION, SOLUTION INTRAVENOUS at 12:47

## 2018-01-14 ASSESSMENT — ENCOUNTER SYMPTOMS
FREQUENCY: 1
DIZZINESS: 0
FEVER: 0
WEAKNESS: 0
ABDOMINAL PAIN: 1
DYSURIA: 0

## 2018-01-14 NOTE — ED PROVIDER NOTES
History     Chief Complaint:  Urinary symptoms and Clinic Referral for IV antibiotics       HPI   Savanna Rehman is a 77 year old female with diabetes mellitus, CHF, CAD, UTIs, and atrial fibrillation currently anticoagulated Coumadin who presents with urinary frequency and clinic referral for IV antibiotics. The patient reports that several weeks ago, she began having stabbing lower abdominal discomfort and urinary frequency. She was seen in clinic for these symptoms and found to have a urinalysis concerning for UTI. On 12/16/17, she was given a course of Amoxicillin and Macrobid, though she did not fill the Macrobid prescription. On 12/18/17, she was then switched to Keflex at [facility], but stopped taking this medication as she began to have genital swelling. She has continued to have frequency of urination and slight lower abdominal discomfort, which prompted her visit to [facility] four days ago. Following her urinalysis results, she was contacted by [facility] last night, who recommended that she seek evaluation here in the ED for IV antibiotics.     She denies recent fevers, chills, or changes in her baseline low back pain.  Of note, the patient additionally reports a history of frequent headaches and was seen in the emergency department on 01/04/18 for headaches, nausea, and vomiting. She was discharged home with Zofran, which she has been using with fairly good management of her nausea. Her headache today is improved from earlier this week.     Allergies:  Petroleum Jelly [Petrolatum]  Shellfish-Derived Products  Aspirin  Bacitracin  Coumadin [Warfarin]  Darvon [Propoxyphene]  Dilaudid [Hydromorphone]  Levaquin [Levofloxacin]  Neosporin [Neomycin-Polymyx-Gramicid]  Oxycodone  Percodan [Oxycodone-Aspirin]  Tramadol  Vicodin [Hydrocodone-Acetaminophen]  Xarelto [Rivaroxaban]  Adhesive Tape  Codeine    Medications:    Kenalog  Biotin  Zantac  Pravachol  Glucotrol  "XL  Warfarin  Ativan  Oxybutynin Chloride    Past Medical History:    Anemia  Atrial fibrillation  Coronary artery disease  Chronic pain  Congestive heart failure  Degenerative disk disease  Fibromyalgia  GERD  Hiatal hernia  Neuropathy  Sleep apnea  Urinary incontinence  Angioedema    Past Surgical History:    Appendectomy  Cholecystectomy  Coronary Angiography   Left heart catheter  Hysterectomy  Knee Replacement NOS  Laparoscopic Nissen Fundoplication  Tonsillectomy  Transposition ulnar nerve    Family History:    No past pertinent family history.    Social History:  The patient presents alone.   Positive for alcohol use, \"socially\".     Review of Systems   Constitutional: Negative for fever.   Gastrointestinal: Positive for abdominal pain.   Genitourinary: Positive for frequency. Negative for dysuria.   Neurological: Negative for dizziness and weakness.   All other systems reviewed and are negative.    Physical Exam   First Vitals:  BP: (!) 156/93  Pulse: 81  Temp: 97  F (36.1  C)  Resp: 20  SpO2: 95 %      Physical Exam    Constitutional:  Pleasant, age appropriate.       Resting comfortably in the bed.  Eyes:    Conjunctiva normal  Neck:    Supple, no meningismus.     CV:     Regular rate and rhythm.      No murmurs, rubs or gallops.     No lower extremity edema.  PULM:    Clear to auscultation bilateral.       No respiratory distress.      Good air exchange.     No rales or wheezing.  ABD:    Soft, non-distended.       Mild diffuse lower abdomina tenderness.     Bowel sounds normal.     No pulsatile masses.       No rebound, guarding or rigidity.     No CVA tenderness.   MSK:     No gross deformity to all four extremities.   LYMPH:   No cervical lymphadenopathy.  NEURO:   Alert.  Good muscular tone, no atrophy.   Skin:    Warm, dry and intact.    Psych:    Mood is good and affect is appropriate.    Emergency Department Course   Laboratory:  CBC: WBC: 6.7, HGB: 15.4, PLT: 282    BMP: Glucose 164 (H), o/w WNL " (Creatinine: 0.87)    INR: 1.94 (H)     UA with micro: Nitrite positive, bacteria moderate, WBC 1 o/w negative    Interventions:  1247 Rocephin 1 g IV    Emergency Department Course:  Nursing notes and vitals reviewed. 1142 I performed an exam of the patient as documented above.     IV inserted. Medicine administered as documented above. Blood drawn. This was sent to the lab for further testing, results above.    1235 I contacted pharmacy at [facility] to obtain additional history regarding prior antibiotic prescriptions for the patient.     The patient had a urinary catheter placed here in the emergency department without difficulty. The patient provided a urine sample here in the emergency department. This was sent for laboratory testing, findings above.     1325 I rechecked the patient and discussed the results of her workup thus far.     Findings and plan explained to the Patient. Patient discharged home with instructions regarding supportive care, medications, and reasons to return. The importance of close follow-up was reviewed. The patient was prescribed Macrobid.     I personally reviewed the laboratory results with the Patient and answered all related questions prior to discharge.   Impression & Plan    Medical Decision Makin-year-old female presented to the ED after referral from outside clinic for ongoing urinary tract infection with lack of improvement utilizing amoxicillin and Keflex.  On examination she appears well.  No clinical signs of pyelonephritis or sepsis.  I reviewed the urine culture from [facility] which was sensitive to ceftriaxone but not to cefazolin.  Patient was given a dose of ceftriaxone in the emergency department.  She has failed treatment with amoxicillin and Keflex.  She is sensitive to Macrobid, Bactrim and ciprofloxacin.  She unfortunately has a Levaquin allergy and Bactrim may augment her INR significantly thus Macrobid is the most  appropriate choice in this individual.  Patient will be discharged home on a 10 day course given her complicated UTI.  Patient to return to ED for any worsening symptoms and follow-up with primary care physician to ensure improvement.    Diagnosis:    ICD-10-CM   1. Acute cystitis without hematuria N30.00       Disposition:  discharged to home    Discharge Medications:  New Prescriptions    NITROFURANTOIN, MACROCRYSTAL-MONOHYDRATE, (MACROBID) 100 MG CAPSULE    Take 1 capsule (100 mg) by mouth 2 times daily for 10 days   Aislinn GOODMAN, am serving as a scribe on 1/14/2018 at 11:42 AM to personally document services performed by Darin Soria MD based on my observations and the provider's statements to me.     Aislinn Castañeda  1/14/2018   [facility] EMERGENCY DEPARTMENT       Darin Soria MD  01/20/18 1525

## 2018-01-14 NOTE — ED AVS SNAPSHOT
[facility] Emergency Department    [address]    [facility] [address]    Phone:  [phone]    Fax:  [phone]                                       Savanna Rehman   MRN: [id number]    Department:  [facility] Emergency Department   Date of Visit:  1/14/2018           After Visit Summary Signature Page     I have received my discharge instructions, and my questions have been answered. I have discussed any challenges I see with this plan with the nurse or doctor.    ..........................................................................................................................................  Patient/Patient Representative Signature      ..........................................................................................................................................  Patient Representative Print Name and Relationship to Patient    ..................................................               ................................................  Date                                            Time    ..........................................................................................................................................  Reviewed by Signature/Title    ...................................................              ..............................................  Date                                                            Time

## 2018-01-14 NOTE — ED AVS SNAPSHOT
[facility] Emergency Department    [address]    [facility] [address]    Phone:  [phone]    Fax:  [phone]                                       Savanna Rehman   MRN: [id number]    Department:  [facility] Emergency Department   Date of Visit:  1/14/2018           Patient Information     Date Of Birth          1940        Your diagnoses for this visit were:     Acute cystitis without hematuria        You were seen by Darin Soria MD.      Follow-up Information     Follow up with Hilary Finney MD. Schedule an appointment as soon as possible for a visit in 3 days.    Specialty:  Family Practice    Contact information:    [facility]  [address]  [facility] [address]  [phone]          Discharge Instructions       Discharge Instructions  Urinary Tract Infection  You or your child have been diagnosed with a urinary tract infection, or UTI. The urinary tract includes the kidneys (which make urine/pee), ureters (the tubes that carry urine/pee from the kidneys to the bladder), the bladder (which stores urine/pee), and urethra (the tube that carries urine/pee out of the bladder). Urinary tract infections occur when bacteria travel up the urethra into the bladder (bladder infection) and, in some cases, from there into the kidneys (kidney infection).  Generally, every Emergency Department visit should have a follow-up clinic visit with either a primary or a specialty clinic/provider. Please follow-up as instructed by your emergency provider today.  Return to the Emergency Department if:    You or your child have severe back pain.    You or your child are vomiting (throwing up) so that you cannot take your medicine.    You or your child have a new fever (had not previously had a fever) over 101 F.    You or your child have confusion or are very weak, or feel very ill.    Your child seems much more ill, will not wake up, will not  respond right, or is crying for a long time and will not calm down.    You or your child are showing signs of dehydration. These signs may include decreased urination (pee), dry mouth/gums/tongue, or decreased activity.    Follow-up with your provider:     Children under 24 months need to be seen by their regular provider within one week after a diagnosis of a UTI. It may be necessary to do some more tests to look at the child s kidney or bladder.    You should begin to feel better within 24 - 48 hours of starting your antibiotic; follow-up with your regular clinic/doctor/provider if this is not the case.    Treatment:     You will be treated with an antibiotic to kill the bacteria. We have to make an educated guess, based on what we know about common bacteria and antibiotics, as to which antibiotic will work for your infection. We will be correct most times but there will be some cases where the antibiotic chosen is not correct (see urine cultures below).    Take a pain medication such as acetaminophen (Tylenol ) or ibuprofen (Advil , Motrin , Nuprin ).    Phenazopyridine (Pyridium , Uristat ) is a prescription medication that numbs the bladder to reduce the burning pain of some UTIs.  The same medication is available in a non-prescription version (Azo-Standard , Urodol ). This medication will change the color of the urine and tears (usually blue or orange). If you wear contacts, do not wear them while taking this medication as they may be stained by the medication.    Urine Cultures:    If indicated, a urine culture may have been performed today. This test generally takes 24-48 hours to complete so the results are not known at this time. The results can confirm that an infection is present but also determine which antibiotic is effective for the specific bacteria that is causing the infection. If your urine culture shows that the antibiotic you were given today will not work to treat your infection, we will  "attempt to contact you to make arrangements to change the antibiotic. If the culture confirms that the antibiotic is effective for your infection, you will not be contacted. We often recommend follow-up with your regular physician/provider on the culture results regardless of this process.    Antibiotic Warning:     If you have been placed on antibiotics - watch for signs of allergic reaction.  These include rash, lip swelling, difficulty breathing, wheezing, and dizziness.  If you develop any of these symptoms, stop the antibiotic immediately and go to an emergency room or urgent care for evaluation.    Probiotics: If you have been given an antibiotic, you may want to also take a probiotic pill or eat yogurt with live cultures. Probiotics have \"good bacteria\" to help your intestines stay healthy. Studies have shown that probiotics help prevent diarrhea and other intestine problems (including C. diff infection) when you take antibiotics. You can buy these without a prescription in the pharmacy section of the store.   If you were given a prescription for medicine here today, be sure to read all of the information (including the package insert) that comes with your prescription.  This will include important information about the medicine, its side effects, and any warnings that you need to know about.  The pharmacist who fills the prescription can provide more information and answer questions you may have about the medicine.  If you have questions or concerns that the pharmacist cannot address, please call or return to the Emergency Department.   Remember that you can always come back to the Emergency Department if you are not able to see your regular provider in the amount of time listed above, if you get any new symptoms, or if there is anything that worries you.      Future Appointments        Provider Department Dept Phone Center    2/22/2018 8:30 PM BED 3 [facility] [phone] " Raffi Sle    3/1/2018 3:30 PM Warner Mims MD [facility] [phone]  SLEEP      24 Hour Appointment Hotline       To make an appointment at any [facility] clinic, call [phone] ([phone]). If you don't have a family doctor or clinic, we will help you find one. [facility] clinics are conveniently located to serve the needs of you and your family.             Review of your medicines      START taking        Dose / Directions Last dose taken    nitroFURantoin (macrocrystal-monohydrate) 100 MG capsule   Commonly known as:  MACROBID   Dose:  100 mg   Quantity:  20 capsule        Take 1 capsule (100 mg) by mouth 2 times daily for 10 days   Refills:  0          Our records show that you are taking the medicines listed below. If these are incorrect, please call your family doctor or clinic.        Dose / Directions Last dose taken    ACETAMINOPHEN PO   Dose:  500 mg        Take 500 mg by mouth   Refills:  0        ADVIL PO   Dose:  400 mg        Take 400 mg by mouth every 8 hours as needed for moderate pain   Refills:  0        BIOTIN 5000 5 MG Caps   Generic drug:  biotin        Refills:  0        glipiZIDE 2.5 MG 24 hr tablet   Commonly known as:  GLUCOTROL XL   Dose:  2.5 mg        Take 2.5 mg by mouth daily   Refills:  1        LORazepam 0.5 MG tablet   Commonly known as:  ATIVAN   Dose:  0.5 mg   Quantity:  12 tablet        Take 1 tablet (0.5 mg) by mouth every 4 hours as needed for anxiety or other (chest pain)   Refills:  0        OXYBUTYNIN CHLORIDE PO   Dose:  5 mg        Take 5 mg by mouth 2 times daily Clarified as immediate release with [facility]./Patient   Refills:  0        pravastatin 20 MG tablet   Commonly known as:  PRAVACHOL   Dose:  20 mg        Take 20 mg by mouth At Bedtime ON HOLD   Refills:  1        ranitidine 150 MG capsule   Commonly known as:  ZANTAC        Take by mouth 2 times daily   Refills:  0        triamcinolone 0.1 % cream   Commonly known as:   KENALOG        Apply topically 2 times daily   Refills:  0        * WARFARIN SODIUM PO   Dose:  2.5 mg        Take 2.5 mg by mouth Take on Mon, Tues, Wed, Thurs, Fri, Sat (6 days a week)   Refills:  0        * WARFARIN SODIUM PO   Dose:  5 mg        Take 5 mg by mouth on Sundays   Refills:  0        * Notice:  This list has 2 medication(s) that are the same as other medications prescribed for you. Read the directions carefully, and ask your doctor or other care provider to review them with you.            Prescriptions were sent or printed at these locations (1 Prescription)                   Other Prescriptions                Printed at Department/Unit printer (1 of 1)         nitroFURantoin, macrocrystal-monohydrate, (MACROBID) 100 MG capsule                Procedures and tests performed during your visit     Basic metabolic panel (BMP)    CBC + differential    INR    Peripheral IV catheter    UA with Microscopic      Orders Needing Specimen Collection     Ordered          01/14/18 1151  Urine Culture - ROUTINE, Prio: Routine, Needs to be Collected     Scheduled Task Status   01/14/18 1151 Collect Urine Culture Open   Order Class:  PCU Collect                  Pending Results     No orders found from 1/12/2018 to 1/15/2018.            Pending Culture Results     No orders found from 1/12/2018 to 1/15/2018.            Pending Results Instructions     If you had any lab results that were not finalized at the time of your Discharge, you can call the ED Lab Result RN at [phone]. You will be contacted by this team for any positive Lab results or changes in treatment. The nurses are available 7 days a week from 10A to 6:30P.  You can leave a message 24 hours per day and they will return your call.        Test Results From Your Hospital Stay        1/14/2018 12:17 PM      Component Results     Component Value Ref Range & Units Status    WBC 6.7 4.0 - 11.0 10e9/L Final    RBC Count 5.18 3.8 - 5.2 10e12/L Final     Hemoglobin 15.4 11.7 - 15.7 g/dL Final    Hematocrit 46.0 35.0 - 47.0 % Final    MCV 89 78 - 100 fl Final    MCH 29.7 26.5 - 33.0 pg Final    MCHC 33.5 31.5 - 36.5 g/dL Final    RDW 13.3 10.0 - 15.0 % Final    Platelet Count 282 150 - 450 10e9/L Final    Diff Method Automated Method  Final    % Neutrophils 49.5 % Final    % Lymphocytes 28.2 % Final    % Monocytes 11.5 % Final    % Eosinophils 9.3 % Final    % Basophils 1.2 % Final    % Immature Granulocytes 0.3 % Final    Nucleated RBCs 0 0 /100 Final    Absolute Neutrophil 3.3 1.6 - 8.3 10e9/L Final    Absolute Lymphocytes 1.9 0.8 - 5.3 10e9/L Final    Absolute Monocytes 0.8 0.0 - 1.3 10e9/L Final    Absolute Eosinophils 0.6 0.0 - 0.7 10e9/L Final    Absolute Basophils 0.1 0.0 - 0.2 10e9/L Final    Abs Immature Granulocytes 0.0 0 - 0.4 10e9/L Final    Absolute Nucleated RBC 0.0  Final         1/14/2018 12:32 PM      Component Results     Component Value Ref Range & Units Status    Sodium 137 133 - 144 mmol/L Final    Potassium 4.0 3.4 - 5.3 mmol/L Final    Chloride 103 94 - 109 mmol/L Final    Carbon Dioxide 25 20 - 32 mmol/L Final    Anion Gap 9 3 - 14 mmol/L Final    Glucose 164 (H) 70 - 99 mg/dL Final    Urea Nitrogen 15 7 - 30 mg/dL Final    Creatinine 0.87 0.52 - 1.04 mg/dL Final    GFR Estimate 64 >60 mL/min/1.7m2 Final    Non  GFR Calc    GFR Estimate If Black 77 >60 mL/min/1.7m2 Final    African American GFR Calc    Calcium 8.6 8.5 - 10.1 mg/dL Final         1/14/2018 12:30 PM      Component Results     Component Value Ref Range & Units Status    INR 1.94 (H) 0.86 - 1.14 Final                Clinical Quality Measure: Blood Pressure Screening     Your blood pressure was checked while you were in the emergency department today. The last reading we obtained was  BP: (!) 153/104 . Please read the guidelines below about what these numbers mean and what you should do about them.  If your systolic blood pressure (the top number) is less than 120  "and your diastolic blood pressure (the bottom number) is less than 80, then your blood pressure is normal. There is nothing more that you need to do about it.  If your systolic blood pressure (the top number) is 120-139 or your diastolic blood pressure (the bottom number) is 80-89, your blood pressure may be higher than it should be. You should have your blood pressure rechecked within a year by a primary care provider.  If your systolic blood pressure (the top number) is 140 or greater or your diastolic blood pressure (the bottom number) is 90 or greater, you may have high blood pressure. High blood pressure is treatable, but if left untreated over time it can put you at risk for heart attack, stroke, or kidney failure. You should have your blood pressure rechecked by a primary care provider within the next 4 weeks.  If your provider in the emergency department today gave you specific instructions to follow-up with your doctor or provider even sooner than that, you should follow that instruction and not wait for up to 4 weeks for your follow-up visit.        Thank you for choosing [facility]       Thank you for choosing [facility] for your care. Our goal is always to provide you with excellent care. Hearing back from our patients is one way we can continue to improve our services. Please take a few minutes to complete the written survey that you may receive in the mail after you visit with us. Thank you!        [employer] Information     [employer] lets you send messages to your doctor, view your test results, renew your prescriptions, schedule appointments and more. To sign up, go to [email or web address] . Click on \"Log in\" on the left side of the screen, which will take you to the Welcome page. Then click on \"Sign up Now\" on the right side of the page.     You will be asked to enter the access code listed below, as well as some personal information. Please follow the directions to create your username and password.   "   Your access code is: [email or web address]  Expires: 2018  2:14 AM     Your access code will  in 90 days. If you need help or a new code, please call your [facility] clinic or [phone].        Care EveryWhere ID     This is your Care EveryWhere ID. This could be used by other organizations to access your [facility] medical records  [id number]        Equal Access to Services     KAYLEIGH MCCLENDON : Hadii naif barrerao Somadeline, waaxda luhussein, qaybta kaalmada coty, hanny hendrickson . [facility] [phone].    ATENCIÓN: Si habla español, tiene a maier disposición servicios gratuitos de asistencia lingüística. Llame al [phone].    We comply with applicable federal civil rights laws and [place] laws. We do not discriminate on the basis of race, color, national origin, age, disability, sex, sexual orientation, or gender identity.            After Visit Summary       This is your record. Keep this with you and show to your community pharmacist(s) and doctor(s) at your next visit.

## 2018-01-14 NOTE — ED NOTES
Pt states that she was called to come back to the ER to receive IV antibiotics for a UTI. Pt has been having urinary frequency. She was vomiting last week. ABCDs intact.

## 2018-01-26 ENCOUNTER — TRANSFERRED RECORDS (OUTPATIENT)
Dept: HEALTH INFORMATION MANAGEMENT | Facility: CLINIC | Age: 78
End: 2018-01-26

## 2018-02-07 ENCOUNTER — TELEPHONE (OUTPATIENT)
Dept: SLEEP MEDICINE | Facility: CLINIC | Age: 78
End: 2018-02-07

## 2018-02-07 NOTE — TELEPHONE ENCOUNTER
Called patient to offer her an open bed for 02/09/18 for a sleep study instead of waiting until the end of the month.    Dary Ashley

## 2018-04-18 ENCOUNTER — TELEPHONE (OUTPATIENT)
Dept: SLEEP MEDICINE | Facility: CLINIC | Age: 78
End: 2018-04-18

## 2018-04-18 NOTE — TELEPHONE ENCOUNTER
I called the patient today, 04/18/2018, and I talked to her over the phone. The patient explains that, due to health issues, she had to cancel the sleep study several times. She explains that she lives alone and does not have a car. She states that she needs a PAP device to be able to sleep well through the night. She wishes to have another last opportunity. She promises me that she will not cancel this one. She explains that she cannot be on a waiting list because she does not have transportation available and moving is difficult for her.    I told her that I will see what I can do and I will call her tomorrow again to let her know.    Warner Mims MD, MPH  Clinical Sleep Medicine

## 2018-04-19 ENCOUNTER — TELEPHONE (OUTPATIENT)
Dept: SLEEP MEDICINE | Facility: CLINIC | Age: 78
End: 2018-04-19

## 2018-04-19 DIAGNOSIS — I48.21 PERMANENT ATRIAL FIBRILLATION (H): ICD-10-CM

## 2018-04-19 DIAGNOSIS — G47.33 OSA (OBSTRUCTIVE SLEEP APNEA): Primary | ICD-10-CM

## 2018-04-19 NOTE — TELEPHONE ENCOUNTER
I called the patient today, 04/19/2018. I was not able to reach her, but a message was left explaining that she cannot schedule another PSG sleep study based on [place] policy. As such, the patient will undergo a portable HST sleep study. As explained, this is not the best option for the patient, but at this point is the only option available. I explained that if the portable HST sleep study indicates she needs a PAP device, the patient will have to get this device from [facility]. An order for a portable HST sleep study was placed today and the patient will follow up with me after completing this test.     The patient was instructed in the voicemail to bring her PAP device once again.    Warner Mims MD, MPH  Clinical Sleep and Occupational / Environmental Medicine

## 2018-05-04 ENCOUNTER — HOSPITAL ENCOUNTER (EMERGENCY)
Facility: CLINIC | Age: 78
Discharge: HOME | End: 2018-05-04
Attending: NURSE PRACTITIONER | Admitting: NURSE PRACTITIONER
Payer: MEDICARE

## 2018-05-04 ENCOUNTER — APPOINTMENT (OUTPATIENT)
Dept: CT IMAGING | Facility: CLINIC | Age: 78
End: 2018-05-04
Attending: NURSE PRACTITIONER
Payer: MEDICARE

## 2018-05-04 VITALS
OXYGEN SATURATION: 91 % | HEART RATE: 81 BPM | HEIGHT: 68 IN | BODY MASS INDEX: 43.19 KG/M2 | TEMPERATURE: 96.7 F | DIASTOLIC BLOOD PRESSURE: 92 MMHG | WEIGHT: 285 LBS | SYSTOLIC BLOOD PRESSURE: 124 MMHG | RESPIRATION RATE: 18 BRPM

## 2018-05-04 DIAGNOSIS — K57.32 DIVERTICULITIS OF COLON: ICD-10-CM

## 2018-05-04 LAB
ALBUMIN SERPL-MCNC: 3.6 G/DL (ref 3.4–5)
ALBUMIN UR-MCNC: NEGATIVE MG/DL
ALP SERPL-CCNC: 73 U/L (ref 40–150)
ALT SERPL W P-5'-P-CCNC: 21 U/L (ref 0–50)
ANION GAP SERPL CALCULATED.3IONS-SCNC: 10 MMOL/L (ref 3–14)
APPEARANCE UR: ABNORMAL
AST SERPL W P-5'-P-CCNC: 26 U/L (ref 0–45)
BACTERIA #/AREA URNS HPF: ABNORMAL /HPF
BASOPHILS # BLD AUTO: 0.1 10E9/L (ref 0–0.2)
BASOPHILS NFR BLD AUTO: 0.5 %
BILIRUB SERPL-MCNC: 0.8 MG/DL (ref 0.2–1.3)
BILIRUB UR QL STRIP: NEGATIVE
BUN SERPL-MCNC: 16 MG/DL (ref 7–30)
CALCIUM SERPL-MCNC: 9.3 MG/DL (ref 8.5–10.1)
CHLORIDE SERPL-SCNC: 102 MMOL/L (ref 94–109)
CO2 SERPL-SCNC: 25 MMOL/L (ref 20–32)
COLOR UR AUTO: ABNORMAL
CREAT SERPL-MCNC: 1.01 MG/DL (ref 0.52–1.04)
DIFFERENTIAL METHOD BLD: NORMAL
EOSINOPHIL # BLD AUTO: 0.3 10E9/L (ref 0–0.7)
EOSINOPHIL NFR BLD AUTO: 2.7 %
ERYTHROCYTE [DISTWIDTH] IN BLOOD BY AUTOMATED COUNT: 14 % (ref 10–15)
GFR SERPL CREATININE-BSD FRML MDRD: 53 ML/MIN/1.7M2
GLUCOSE SERPL-MCNC: 212 MG/DL (ref 70–99)
GLUCOSE UR STRIP-MCNC: NEGATIVE MG/DL
HCT VFR BLD AUTO: 45.3 % (ref 35–47)
HGB BLD-MCNC: 15.3 G/DL (ref 11.7–15.7)
HGB UR QL STRIP: NEGATIVE
IMM GRANULOCYTES # BLD: 0 10E9/L (ref 0–0.4)
IMM GRANULOCYTES NFR BLD: 0.3 %
KETONES UR STRIP-MCNC: NEGATIVE MG/DL
LEUKOCYTE ESTERASE UR QL STRIP: ABNORMAL
LYMPHOCYTES # BLD AUTO: 1.7 10E9/L (ref 0.8–5.3)
LYMPHOCYTES NFR BLD AUTO: 16.6 %
MCH RBC QN AUTO: 30.4 PG (ref 26.5–33)
MCHC RBC AUTO-ENTMCNC: 33.8 G/DL (ref 31.5–36.5)
MCV RBC AUTO: 90 FL (ref 78–100)
MONOCYTES # BLD AUTO: 0.9 10E9/L (ref 0–1.3)
MONOCYTES NFR BLD AUTO: 8.9 %
MUCOUS THREADS #/AREA URNS LPF: PRESENT /LPF
NEUTROPHILS # BLD AUTO: 7.1 10E9/L (ref 1.6–8.3)
NEUTROPHILS NFR BLD AUTO: 71 %
NITRATE UR QL: NEGATIVE
NRBC # BLD AUTO: 0 10*3/UL
NRBC BLD AUTO-RTO: 0 /100
PH UR STRIP: 5 PH (ref 5–7)
PLATELET # BLD AUTO: 306 10E9/L (ref 150–450)
POTASSIUM SERPL-SCNC: 3.8 MMOL/L (ref 3.4–5.3)
PROT SERPL-MCNC: 8.3 G/DL (ref 6.8–8.8)
RBC # BLD AUTO: 5.03 10E12/L (ref 3.8–5.2)
RBC #/AREA URNS AUTO: 1 /HPF (ref 0–2)
SODIUM SERPL-SCNC: 137 MMOL/L (ref 133–144)
SOURCE: ABNORMAL
SP GR UR STRIP: 1.02 (ref 1–1.03)
SQUAMOUS #/AREA URNS AUTO: 8 /HPF (ref 0–1)
UROBILINOGEN UR STRIP-MCNC: 4 MG/DL (ref 0–2)
WBC # BLD AUTO: 10 10E9/L (ref 4–11)
WBC #/AREA URNS AUTO: 8 /HPF (ref 0–5)

## 2018-05-04 PROCEDURE — 81001 URINALYSIS AUTO W/SCOPE: CPT | Performed by: NURSE PRACTITIONER

## 2018-05-04 PROCEDURE — 74176 CT ABD & PELVIS W/O CONTRAST: CPT

## 2018-05-04 PROCEDURE — 85025 COMPLETE CBC W/AUTO DIFF WBC: CPT | Performed by: EMERGENCY MEDICINE

## 2018-05-04 PROCEDURE — 99284 EMERGENCY DEPT VISIT MOD MDM: CPT | Mod: 25

## 2018-05-04 PROCEDURE — 87086 URINE CULTURE/COLONY COUNT: CPT | Performed by: NURSE PRACTITIONER

## 2018-05-04 PROCEDURE — 80053 COMPREHEN METABOLIC PANEL: CPT | Performed by: EMERGENCY MEDICINE

## 2018-05-04 ASSESSMENT — ENCOUNTER SYMPTOMS
BLOOD IN STOOL: 0
FEVER: 0
SHORTNESS OF BREATH: 0
DIARRHEA: 1
HEMATURIA: 0
FLANK PAIN: 0
NAUSEA: 1
ABDOMINAL PAIN: 1
FREQUENCY: 1
VOMITING: 1
DYSURIA: 0

## 2018-05-04 NOTE — ED AVS SNAPSHOT
[facility] Emergency Department    [address]    [facility] [address]    Phone:  [phone]    Fax:  [phone]                                       Savanna Rehman   MRN: [id number]    Department:  [facility] Emergency Department   Date of Visit:  5/4/2018           After Visit Summary Signature Page     I have received my discharge instructions, and my questions have been answered. I have discussed any challenges I see with this plan with the nurse or doctor.    ..........................................................................................................................................  Patient/Patient Representative Signature      ..........................................................................................................................................  Patient Representative Print Name and Relationship to Patient    ..................................................               ................................................  Date                                            Time    ..........................................................................................................................................  Reviewed by Signature/Title    ...................................................              ..............................................  Date                                                            Time

## 2018-05-04 NOTE — ED AVS SNAPSHOT
[facility] Emergency Department    [address]    [facility] [address]    Phone:  [phone]    Fax:  [phone]                                       Savanna Rehman   MRN: [id number]    Department:  [facility] Emergency Department   Date of Visit:  5/4/2018           Patient Information     Date Of Birth          1940        Your diagnoses for this visit were:     Diverticulitis of colon        You were seen by Zoltan Mcgregor APRN CNP.      Follow-up Information     Follow up with Hilary Finney MD In 3 days.    Specialty:  Family Practice    Why:  if continuned symptoms or sooner if worsening    Contact information:    [facility]  [address]  [facility] [address]  [phone]          Follow up with [facility] Emergency Department.    Specialty:  EMERGENCY MEDICINE    Why:  If symptoms worsen    Contact information:    [address]  [facility] [id number] [phone]        Discharge Instructions         Diverticulitis    Some people get pouches along the wall of the colon as they get older. The pouches, called diverticuli, usually cause no symptoms. If the pouches become blocked, you can get an infection. This infection is called diverticulitis. It causes pain in your lower abdomen and fever. If not treated, it can become a serious condition, causing an abscess to form inside the pouch. The abscess may block the intestinal tract even or rupture, spreading infection throughout the abdomen.  When treatment is started early, oral antibiotics alone may be enough to cure diverticulitis. This method is tried first. But, if you don't improve or if your condition gets worse while using oral antibiotics, you may need to be admitted to the hospital for IV antibiotics. Severe cases may require surgery.  Home care  The following guidelines will help you care for yourself at home:    During the acute illness, rest  and follow your healthcare provider's instructions about diet. Sometimes you will need to follow a clear liquid diet to rest your bowel. Once your symptoms are better, you may be told to follow a low-fiber diet for some time. Include foods like:  ? Flake cereal, mashed potatoes, pancakes, waffles, pasta, white bread, rice, applesauce, bananas, eggs, fish, poultry, tofu, and cooked soft vegetables    Take antibiotics exactly as instructed. Don't miss any doses or stop taking the medication, even if you feel better.    Monitor your temperature and tell your healthcare provider if you have rising temperatures.  Preventing future attacks  Once you have an episode of diverticulitis, you are at risk for having it again. After you have recovered from this episode, you may be able to lower your risk by eating a high-fiber diet (20 gm/day to 35 gm/day of fiber). This cleans out the colon pouches that already exist and may prevent new ones from forming. Foods high in fiber include fresh fruits and edible peelings, raw or lightly cooked vegetables, whole grain cereals and breads, dried beans and peas, and bran.  Other steps that can help prevent future attacks include:    Take your medicines, such as antibiotics, as your healthcare provider says.    Drink 6 to 8 glasses of water every day, unless told otherwise.    Use a heating pad or hot water bottle to help abdominal cramping or pain.    Begin an exercise program. Ask your healthcare provider how to get started. You can benefit from simple activities such as walking or gardening.    Treat diarrhea with a bland diet. Start with liquids only; then slowly add fiber over time.    Watch for changes in your bowel movements (constipation to diarrhea). Avoid constipation by eating a high fiber diet and taking a stool softener if needed.    Get plenty of rest and sleep.  Follow-up care  Follow up with your healthcare provider as advised or sooner if you are not getting better in the  next 2 days.  When to seek medical advice  Call your healthcare provider right away if any of these occur:    Fever of 100.4 F (38 C) or higher, or as directed by your healthcare provider    Repeated vomiting or swelling of the abdomen    Weakness, dizziness, light-headedness    Pain in your abdomen that gets worse, severe, or spreads to your back    Pain that moves to the right lower abdomen    Rectal bleeding (stools that are red, black or maroon color)    Unexpected vaginal bleeding  Date Last Reviewed: 9/1/2016 2000-2017 [employer]. [address] [place] [address]. All rights reserved. This information is not intended as a substitute for professional medical care. Always follow your healthcare professional's instructions.        Diet for Diverticular Disease  What is diverticular disease?   When the inner wall of your colon weakens and forms small pouches, you have diverticulosis. For most people, this causes no symptoms.   If the pouches become inflamed or infected, you have diverticulitis. Warning signs may include pain in the lower abdomen, chills and vomiting (throwing up).  These conditions are called diverticular disease.  What causes it?  The cause has been linked to many years of eating too little fiber. People who eat plenty of whole grains, fresh fruits and vegetables are less likely to get this disease.   Once the pouches have formed, there is no way to reverse them.   How much fiber should I get?  If you're healing from diverticulitis or surgery to remove the inflamed area, you will at first follow a low-fiber diet (less than 10 grams each day). Then, as your doctor advises, you may slowly add fiber. Increase your intake by 1 to 2 grams a day. Your goal will be 25 to 30 grams a day.   To avoid future problems, continue to get 25 to 30 grams of fiber each day.   How can fiber help?   A high-fiber diet will help you have regular bowel movements. Fiber adds bulk to the stool  and helps it pass more easily. Fiber also helps prevent the pouches from growing larger or getting infected.  In the past, doctors have warned patients to avoid eating nuts, seeds and popcorn. They believed these foods could get caught in the pouches and inflame them. But recent studies do not support this idea.   Please ask your dietitian for the handout Fiber Content in [employer]. It will show you how to get more fiber in your diet.  For informational purposes only. Not to replace the advice of your health care provider.   Copyright   2007 [facility]. All rights reserved. [employer] 014725 - REV 09/15.      Your next 10 appointments already scheduled     May 11, 2018  2:00 PM CDT   Return Sleep Patient with Warner Mims MD   [facility]    [address]  [facility] [address] [phone]              24 Hour Appointment Hotline       To make an appointment at any [facility] clinic, call [phone] ([phone]). If you don't have a family doctor or clinic, we will help you find one. [facility] clinics are conveniently located to serve the needs of you and your family.             Review of your medicines      START taking        Dose / Directions Last dose taken    amoxicillin-clavulanate 875-125 MG per tablet   Commonly known as:  AUGMENTIN   Dose:  1 tablet   Quantity:  30 tablet        Take 1 tablet by mouth 3 times daily for 10 days   Refills:  0          Our records show that you are taking the medicines listed below. If these are incorrect, please call your family doctor or clinic.        Dose / Directions Last dose taken    ACETAMINOPHEN PO   Dose:  500 mg        Take 500 mg by mouth   Refills:  0        ADVIL PO   Dose:  400 mg        Take 400 mg by mouth every 8 hours as needed for moderate pain   Refills:  0        BIOTIN 5000 5 MG Caps   Generic drug:  biotin        Refills:  0        glipiZIDE 2.5  MG 24 hr tablet   Commonly known as:  GLUCOTROL XL   Dose:  2.5 mg        Take 2.5 mg by mouth daily   Refills:  1        LORazepam 0.5 MG tablet   Commonly known as:  ATIVAN   Dose:  0.5 mg   Quantity:  12 tablet        Take 1 tablet (0.5 mg) by mouth every 4 hours as needed for anxiety or other (chest pain)   Refills:  0        OXYBUTYNIN CHLORIDE PO   Dose:  5 mg        Take 5 mg by mouth 2 times daily Clarified as immediate release with [facility]Patient   Refills:  0        pravastatin 20 MG tablet   Commonly known as:  PRAVACHOL   Dose:  20 mg        Take 20 mg by mouth At Bedtime ON HOLD   Refills:  1        ranitidine 150 MG capsule   Commonly known as:  ZANTAC        Take by mouth 2 times daily   Refills:  0        triamcinolone 0.1 % cream   Commonly known as:  KENALOG        Apply topically 2 times daily   Refills:  0        * WARFARIN SODIUM PO   Dose:  2.5 mg        Take 2.5 mg by mouth Take on Sun Mon, Wed, Fri   Refills:  0        * WARFARIN SODIUM PO   Dose:  3.75 mg        Take 3.75 mg by mouth Tues, Thus, Sat   Refills:  0        * Notice:  This list has 2 medication(s) that are the same as other medications prescribed for you. Read the directions carefully, and ask your doctor or other care provider to review them with you.            Prescriptions were sent or printed at these locations (1 Prescription)                   Other Prescriptions                Printed at Department/Unit printer (1 of 1)         amoxicillin-clavulanate (AUGMENTIN) 875-125 MG per tablet                Procedures and tests performed during your visit     Abd/pelvis CT no contrast - [facility] Protocol    CBC with platelets differential    Comprehensive metabolic panel    Peripheral IV catheter    UA reflex to Microscopic      Orders Needing Specimen Collection     None      Pending Results     Date and Time Order Name Status Description    5/4/2018 1446 Abd/pelvis CT no contrast - Stone Protocol Preliminary              Pending Culture Results     No orders found from 5/2/2018 to 5/5/2018.            Pending Results Instructions     If you had any lab results that were not finalized at the time of your Discharge, you can call the ED Lab Result RN at [phone]. You will be contacted by this team for any positive Lab results or changes in treatment. The nurses are available 7 days a week from 10A to 6:30P.  You can leave a message 24 hours per day and they will return your call.        Test Results From Your Hospital Stay        5/4/2018  1:36 PM      Component Results     Component Value Ref Range & Units Status    WBC 10.0 4.0 - 11.0 10e9/L Final    RBC Count 5.03 3.8 - 5.2 10e12/L Final    Hemoglobin 15.3 11.7 - 15.7 g/dL Final    Hematocrit 45.3 35.0 - 47.0 % Final    MCV 90 78 - 100 fl Final    MCH 30.4 26.5 - 33.0 pg Final    MCHC 33.8 31.5 - 36.5 g/dL Final    RDW 14.0 10.0 - 15.0 % Final    Platelet Count 306 150 - 450 10e9/L Final    Diff Method Automated Method  Final    % Neutrophils 71.0 % Final    % Lymphocytes 16.6 % Final    % Monocytes 8.9 % Final    % Eosinophils 2.7 % Final    % Basophils 0.5 % Final    % Immature Granulocytes 0.3 % Final    Nucleated RBCs 0 0 /100 Final    Absolute Neutrophil 7.1 1.6 - 8.3 10e9/L Final    Absolute Lymphocytes 1.7 0.8 - 5.3 10e9/L Final    Absolute Monocytes 0.9 0.0 - 1.3 10e9/L Final    Absolute Eosinophils 0.3 0.0 - 0.7 10e9/L Final    Absolute Basophils 0.1 0.0 - 0.2 10e9/L Final    Abs Immature Granulocytes 0.0 0 - 0.4 10e9/L Final    Absolute Nucleated RBC 0.0  Final         5/4/2018  2:01 PM      Component Results     Component Value Ref Range & Units Status    Sodium 137 133 - 144 mmol/L Final    Potassium 3.8 3.4 - 5.3 mmol/L Final    Chloride 102 94 - 109 mmol/L Final    Carbon Dioxide 25 20 - 32 mmol/L Final    Anion Gap 10 3 - 14 mmol/L Final    Glucose 212 (H) 70 - 99 mg/dL Final    Urea Nitrogen 16 7 - 30 mg/dL Final    Creatinine 1.01 0.52 - 1.04 mg/dL Final     GFR Estimate 53 (L) >60 mL/min/1.7m2 Final    Non  GFR Calc    GFR Estimate If Black 65 >60 mL/min/1.7m2 Final    African American GFR Calc    Calcium 9.3 8.5 - 10.1 mg/dL Final    Bilirubin Total 0.8 0.2 - 1.3 mg/dL Final    Albumin 3.6 3.4 - 5.0 g/dL Final    Protein Total 8.3 6.8 - 8.8 g/dL Final    Alkaline Phosphatase 73 40 - 150 U/L Final    ALT 21 0 - 50 U/L Final    AST 26 0 - 45 U/L Final         5/4/2018  3:50 PM      Narrative     CT ABDOMEN AND PELVIS WITHOUT CONTRAST   5/4/2018 3:04 PM     HISTORY: Abdominal pain.      TECHNIQUE: No IV contrast material. Radiation dose for this scan was  reduced using automated exposure control, adjustment of the mA and/or  kV according to patient size, or iterative reconstruction technique.    COMPARISON: None.    FINDINGS: Scans through the lung bases are unremarkable. The  noncontrast appearance of the liver is normal. Previous  cholecystectomy. Spleen is normal. Atrophic pancreas.    No adrenal lesions. Kidneys are normal. No kidney stones or  hydronephrosis.    No retroperitoneal adenopathy or evidence of aortic aneurysm.    Scans through the pelvis demonstrate a normal-appearing bladder. No  adnexal masses. There is inflammation surrounding the distal sigmoid  colon near the rectosigmoid junction best seen on axial series 2 image  76. No evidence of abscess, free air, or free fluid. No dilated bowel  or evidence of obstruction.        Impression     IMPRESSION: Mild or early diverticulitis near the rectosigmoid  junction. No free air, free fluid, or evidence of abscess.         5/4/2018  4:04 PM      Component Results     Component Value Ref Range & Units Status    Color Urine Yi  Final    Appearance Urine Slightly Cloudy  Final    Glucose Urine Negative NEG^Negative mg/dL Final    Bilirubin Urine Negative NEG^Negative Final    Ketones Urine Negative NEG^Negative mg/dL Final    Specific Gravity Urine 1.021 1.003 - 1.035 Final    Blood Urine  Negative NEG^Negative Final    pH Urine 5.0 5.0 - 7.0 pH Final    Protein Albumin Urine Negative NEG^Negative mg/dL Final    Urobilinogen mg/dL 4.0 (H) 0.0 - 2.0 mg/dL Final    Nitrite Urine Negative NEG^Negative Final    Leukocyte Esterase Urine Trace (A) NEG^Negative Final    Source Midstream Urine  Final    RBC Urine 1 0 - 2 /HPF Final    WBC Urine 8 (H) 0 - 5 /HPF Final    Bacteria Urine Few (A) NEG^Negative /HPF Final    Squamous Epithelial /HPF Urine 8 (H) 0 - 1 /HPF Final    Mucous Urine Present (A) NEG^Negative /LPF Final                Clinical Quality Measure: Blood Pressure Screening     Your blood pressure was checked while you were in the emergency department today. The last reading we obtained was  BP: (!) 124/92 . Please read the guidelines below about what these numbers mean and what you should do about them.  If your systolic blood pressure (the top number) is less than 120 and your diastolic blood pressure (the bottom number) is less than 80, then your blood pressure is normal. There is nothing more that you need to do about it.  If your systolic blood pressure (the top number) is 120-139 or your diastolic blood pressure (the bottom number) is 80-89, your blood pressure may be higher than it should be. You should have your blood pressure rechecked within a year by a primary care provider.  If your systolic blood pressure (the top number) is 140 or greater or your diastolic blood pressure (the bottom number) is 90 or greater, you may have high blood pressure. High blood pressure is treatable, but if left untreated over time it can put you at risk for heart attack, stroke, or kidney failure. You should have your blood pressure rechecked by a primary care provider within the next 4 weeks.  If your provider in the emergency department today gave you specific instructions to follow-up with your doctor or provider even sooner than that, you should follow that instruction and not wait for up to 4 weeks  "for your follow-up visit.        Thank you for choosing [facility]       Thank you for choosing [facility] for your care. Our goal is always to provide you with excellent care. Hearing back from our patients is one way we can continue to improve our services. Please take a few minutes to complete the written survey that you may receive in the mail after you visit with us. Thank you!        [employer] Information     [employer] lets you send messages to your doctor, view your test results, renew your prescriptions, schedule appointments and more. To sign up, go to [email or web address] . Click on \"Log in\" on the left side of the screen, which will take you to the Welcome page. Then click on \"Sign up Now\" on the right side of the page.     You will be asked to enter the access code listed below, as well as some personal information. Please follow the directions to create your username and password.     Your access code is: [email or web address]  Expires: 2018  4:27 PM     Your access code will  in 90 days. If you need help or a new code, please call your [facility] clinic or [phone].        Care EveryWhere ID     This is your Care EveryWhere ID. This could be used by other organizations to access your [facility] medical records  [id number]        Equal Access to Services     KAYLEIGH MCCLENDON : Jatinder Read, waaxda luqadaha, qaybta kaalmada adekristieyada, hanny patton. So [facility] [phone].    ATENCIÓN: Si habla español, tiene a maier disposición servicios gratuitos de asistencia lingüística. Llame al [phone].    We comply with applicable federal civil rights laws and [place] laws. We do not discriminate on the basis of race, color, national origin, age, disability, sex, sexual orientation, or gender identity.            After Visit Summary       This is your record. Keep this with you and show to your community pharmacist(s) and doctor(s) at your next visit.                  "

## 2018-05-04 NOTE — ED TRIAGE NOTES
Reports LLQ abdominal pain, NVD x 3 weeks; states just finished Keflex for UTI. ABCs intact. States she has no relief from antibiotic and continues to suffer from incontinence.

## 2018-05-04 NOTE — ED PROVIDER NOTES
History     Chief Complaint:  Nausea, vomiting, and diarrhea    HPI   Savanna Rehman is a 77 year old female with history of CAD, CHF, and atrial fibrillation on Warfarin who presents with her daughter for evaluation of nausea, vomiting, and diarrhea. The patient states she was diagnosed with a bladder infection 3 weeks ago and just finished her course of Keflex 2 days ago. She states she has been experiencing generalize abdominal pain prior to her diagnosis. She has been experiencing nausea, vomiting, and diarrhea since she finished her antibiotics. Here, the patient complains of continued symptoms. She denies fever, chest pain, shortness of breath, bloody stools, or hematuria.     Allergies:  Petroleum jelly  Shellfish-derived products  Aspirin  Bacitracin  Coumadin   Darvon   Dilaudid  Levaquin   Oxycodone  Percodan  Tramadol   Vicodin   Xarelto   Adhesive tape  Codeine    Medications:    ACETAMINOPHEN PO  biotin (BIOTIN 5000) 5 MG CAPS  glipiZIDE (GLUCOTROL XL) 2.5 MG 24 hr tablet  Ibuprofen (ADVIL PO)  LORazepam (ATIVAN) 0.5 MG tablet  OXYBUTYNIN CHLORIDE PO  pravastatin (PRAVACHOL) 20 MG tablet  ranitidine (ZANTAC) 150 MG capsule  triamcinolone (KENALOG) 0.1 % cream  WARFARIN SODIUM PO     Past Medical History:    Hiatal hernia  Diaphragmatic hernia  Anemia  Atrial fibrillation   CAD  Chronic pain   CHF  Degenerative disk disease  Fibromyalgia  GERD  Neuropathy   Sleep apnea  Urinary incontinence  Vitamin D deficiency    Past Surgical History:    Appendectomy  Cholecystectomy  Hysterectomy  Heart cath   Nissen fundoplication laparoscopic   Transposition ulnar nerve    Family History:    History reviewed. No pertinent family history.      Social History:  Smoking status: Former smoker  Alcohol use: Social   Marital Status:        Review of Systems   Constitutional: Negative for fever.   Respiratory: Negative for shortness of breath.    Cardiovascular: Negative for chest pain.   Gastrointestinal:  "Positive for abdominal pain, diarrhea, nausea and vomiting. Negative for blood in stool.   Genitourinary: Positive for frequency. Negative for dysuria, flank pain and hematuria.   All other systems reviewed and are negative.    Physical Exam   First Vitals:   BP Temp Temp src Pulse Heart Rate Resp SpO2 Height Weight   05/04/18 1313 (!) 124/92 96.7  F (35.9  C) Temporal 81 81 18 91 % 1.727 m (5' 8\") 129.3 kg (285 lb)      Physical Exam  General: Alert, No obvious discomfort, well kept, morbidly obese  Eyes: PERRL, conjunctivae pink no scleral icterus or conjunctival injection  ENT:   Moist mucus membranes, posterior oropharynx clear without erythema or exudates, No lymphadenopathy, Normal voice  Resp:  Lungs clear to auscultation bilaterally, no crackles/rubs/wheezes. Good air movement  CV:  Normal rate and rhythm, no murmurs/rubs/gallops  GI:  Abdomen soft and non-distended.  Normoactive BS.  No guarding or rebound, No masses. Generalized lower abdominal tenderness to palpation, left greater than right. No CVA tenderness.   Skin:  Warm, dry.  No rashes or petechiae  Musculoskeletal: No peripheral edema or calf tenderness, Normal gross ROM   Neuro: Alert and oriented to person/place/time, normal sensation  Psychiatric: Normal affect, cooperative, good eye contact    Emergency Department Course     Imaging:  Radiographic findings were communicated with the patient and family who voiced understanding of the findings.    CT-scan abdomen pelvis w/o contrast:  Mild or early diverticulitis near the rectosigmoid  junction. No free air, free fluid, or evidence of abscess.  As read by Radiology.      Laboratory:  CBC: WNL (WBC 10.0, HGB 15.3, )   CMP: Glucose 212(H), GFR 53(L), o/w WNL   UA: urobilinogen mg/dL 4.0(H), leukocyte esterase trace (A), WBC/HPF 8(H), bacteria few(A), squamous epithelial/HPF 8(H), mucous present (A), o/w Negative     Emergency Department Course:  Past medical records, nursing notes, and " vitals reviewed.  1431: I performed an exam of the patient and obtained history, as documented above.   Labs obtained as above   The patient was sent for a CT scan while in the emergency department, findings above.    1606: I rechecked the patient. Findings and plan explained to the Patient. Patient discharged home with instructions regarding supportive care, medications, and reasons to return. The importance of close follow-up was reviewed.      Impression & Plan      Medical Decision Making:  Savanna Rehman presented with abdominal pain and CT confirms diverticulitis without abscess or perforation.  Her pain has been controlled by interventions in the emergency department.  This represents uncomplicated diverticulitis, at this time.  The natural history of this diagnosis was discussed, and I educated the patient regarding the symptoms and signs that should prompt return to the Emergency Department.  This would include worsening fevers, chills, vomiting, and more intense pain.  Savanna is to take antibiotics and pain medications as directed.  Follow-up with primary care physician is indicated in 3 days.  If more episodes occur in the future, consultation with colo-rectal surgery is indicated.    Diagnosis:    ICD-10-CM    1. Diverticulitis of colon K57.32      Disposition:  discharged to home with Augmentin    Discharge Medications:  New Prescriptions    AMOXICILLIN-CLAVULANATE (AUGMENTIN) 875-125 MG PER TABLET    Take 1 tablet by mouth 3 times daily for 10 days     [facility] EMERGENCY DEPARTMENT    REX, Santy Prasad, am serving as a scribe at 2:31 PM on 5/4/2018 to document services personally performed by Zoltan Mcgregor APRN CNP based on my observations and the provider's statements to me.       Zoltan Mcgregor APRN CNP  05/04/18 8494

## 2018-05-04 NOTE — DISCHARGE INSTRUCTIONS
Diverticulitis    Some people get pouches along the wall of the colon as they get older. The pouches, called diverticuli, usually cause no symptoms. If the pouches become blocked, you can get an infection. This infection is called diverticulitis. It causes pain in your lower abdomen and fever. If not treated, it can become a serious condition, causing an abscess to form inside the pouch. The abscess may block the intestinal tract even or rupture, spreading infection throughout the abdomen.  When treatment is started early, oral antibiotics alone may be enough to cure diverticulitis. This method is tried first. But, if you don't improve or if your condition gets worse while using oral antibiotics, you may need to be admitted to the hospital for IV antibiotics. Severe cases may require surgery.  Home care  The following guidelines will help you care for yourself at home:    During the acute illness, rest and follow your healthcare provider's instructions about diet. Sometimes you will need to follow a clear liquid diet to rest your bowel. Once your symptoms are better, you may be told to follow a low-fiber diet for some time. Include foods like:  ? Flake cereal, mashed potatoes, pancakes, waffles, pasta, white bread, rice, applesauce, bananas, eggs, fish, poultry, tofu, and cooked soft vegetables    Take antibiotics exactly as instructed. Don't miss any doses or stop taking the medication, even if you feel better.    Monitor your temperature and tell your healthcare provider if you have rising temperatures.  Preventing future attacks  Once you have an episode of diverticulitis, you are at risk for having it again. After you have recovered from this episode, you may be able to lower your risk by eating a high-fiber diet (20 gm/day to 35 gm/day of fiber). This cleans out the colon pouches that already exist and may prevent new ones from forming. Foods high in fiber include fresh fruits and edible peelings, raw or  lightly cooked vegetables, whole grain cereals and breads, dried beans and peas, and bran.  Other steps that can help prevent future attacks include:    Take your medicines, such as antibiotics, as your healthcare provider says.    Drink 6 to 8 glasses of water every day, unless told otherwise.    Use a heating pad or hot water bottle to help abdominal cramping or pain.    Begin an exercise program. Ask your healthcare provider how to get started. You can benefit from simple activities such as walking or gardening.    Treat diarrhea with a bland diet. Start with liquids only; then slowly add fiber over time.    Watch for changes in your bowel movements (constipation to diarrhea). Avoid constipation by eating a high fiber diet and taking a stool softener if needed.    Get plenty of rest and sleep.  Follow-up care  Follow up with your healthcare provider as advised or sooner if you are not getting better in the next 2 days.  When to seek medical advice  Call your healthcare provider right away if any of these occur:    Fever of 100.4 F (38 C) or higher, or as directed by your healthcare provider    Repeated vomiting or swelling of the abdomen    Weakness, dizziness, light-headedness    Pain in your abdomen that gets worse, severe, or spreads to your back    Pain that moves to the right lower abdomen    Rectal bleeding (stools that are red, black or maroon color)    Unexpected vaginal bleeding  Date Last Reviewed: 9/1/2016 2000-2017 [employer]. [address] [place] [address]. All rights reserved. This information is not intended as a substitute for professional medical care. Always follow your healthcare professional's instructions.        Diet for Diverticular Disease  What is diverticular disease?   When the inner wall of your colon weakens and forms small pouches, you have diverticulosis. For most people, this causes no symptoms.   If the pouches become inflamed or infected, you have  diverticulitis. Warning signs may include pain in the lower abdomen, chills and vomiting (throwing up).  These conditions are called diverticular disease.  What causes it?  The cause has been linked to many years of eating too little fiber. People who eat plenty of whole grains, fresh fruits and vegetables are less likely to get this disease.   Once the pouches have formed, there is no way to reverse them.   How much fiber should I get?  If you're healing from diverticulitis or surgery to remove the inflamed area, you will at first follow a low-fiber diet (less than 10 grams each day). Then, as your doctor advises, you may slowly add fiber. Increase your intake by 1 to 2 grams a day. Your goal will be 25 to 30 grams a day.   To avoid future problems, continue to get 25 to 30 grams of fiber each day.   How can fiber help?   A high-fiber diet will help you have regular bowel movements. Fiber adds bulk to the stool and helps it pass more easily. Fiber also helps prevent the pouches from growing larger or getting infected.  In the past, doctors have warned patients to avoid eating nuts, seeds and popcorn. They believed these foods could get caught in the pouches and inflame them. But recent studies do not support this idea.   Please ask your dietitian for the handout Fiber Content in Common Foods. It will show you how to get more fiber in your diet.  For informational purposes only. Not to replace the advice of your health care provider.   Copyright   2007 [facility]. All rights reserved. [employer] [id number] - REV 09/15.

## 2018-05-05 LAB
BACTERIA SPEC CULT: NO GROWTH
Lab: NORMAL
SPECIMEN SOURCE: NORMAL

## 2018-05-07 ENCOUNTER — NURSE TRIAGE (OUTPATIENT)
Dept: NURSING | Facility: CLINIC | Age: 78
End: 2018-05-07

## 2018-05-07 ENCOUNTER — HOSPITAL ENCOUNTER (EMERGENCY)
Facility: CLINIC | Age: 78
Discharge: HOME | End: 2018-05-07
Attending: EMERGENCY MEDICINE | Admitting: EMERGENCY MEDICINE
Payer: MEDICARE

## 2018-05-07 VITALS
HEART RATE: 79 BPM | OXYGEN SATURATION: 99 % | RESPIRATION RATE: 20 BRPM | TEMPERATURE: 97.6 F | SYSTOLIC BLOOD PRESSURE: 145 MMHG | DIASTOLIC BLOOD PRESSURE: 111 MMHG

## 2018-05-07 DIAGNOSIS — K57.32 DIVERTICULITIS OF COLON: ICD-10-CM

## 2018-05-07 DIAGNOSIS — T50.905A ADVERSE EFFECT OF DRUG, INITIAL ENCOUNTER: ICD-10-CM

## 2018-05-07 DIAGNOSIS — B37.31 VULVOVAGINAL CANDIDIASIS: ICD-10-CM

## 2018-05-07 LAB
SPECIMEN SOURCE: NORMAL
WET PREP SPEC: NORMAL

## 2018-05-07 PROCEDURE — 99283 EMERGENCY DEPT VISIT LOW MDM: CPT

## 2018-05-07 PROCEDURE — 87210 SMEAR WET MOUNT SALINE/INK: CPT | Performed by: EMERGENCY MEDICINE

## 2018-05-07 PROCEDURE — 25000125 ZZHC RX 250: Performed by: EMERGENCY MEDICINE

## 2018-05-07 PROCEDURE — A9270 NON-COVERED ITEM OR SERVICE: HCPCS | Mod: GY | Performed by: EMERGENCY MEDICINE

## 2018-05-07 PROCEDURE — 25000132 ZZH RX MED GY IP 250 OP 250 PS 637: Mod: GY | Performed by: EMERGENCY MEDICINE

## 2018-05-07 RX ORDER — METRONIDAZOLE 500 MG/1
500 TABLET ORAL 3 TIMES DAILY
Qty: 21 TABLET | Refills: 0 | Status: SHIPPED | OUTPATIENT
Start: 2018-05-07 | End: 2018-05-14

## 2018-05-07 RX ORDER — ONDANSETRON 4 MG/1
8 TABLET, ORALLY DISINTEGRATING ORAL ONCE
Status: COMPLETED | OUTPATIENT
Start: 2018-05-07 | End: 2018-05-07

## 2018-05-07 RX ORDER — SULFAMETHOXAZOLE/TRIMETHOPRIM 800-160 MG
1 TABLET ORAL 2 TIMES DAILY
Qty: 14 TABLET | Refills: 0 | Status: SHIPPED | OUTPATIENT
Start: 2018-05-07 | End: 2018-05-14

## 2018-05-07 RX ORDER — FLUCONAZOLE 150 MG/1
150 TABLET ORAL ONCE
Qty: 1 TABLET | Refills: 0 | Status: SHIPPED | OUTPATIENT
Start: 2018-05-07 | End: 2018-05-07

## 2018-05-07 RX ORDER — ONDANSETRON 4 MG/1
4 TABLET, ORALLY DISINTEGRATING ORAL EVERY 6 HOURS PRN
Qty: 15 TABLET | Refills: 0 | Status: SHIPPED | OUTPATIENT
Start: 2018-05-07 | End: 2018-05-10

## 2018-05-07 RX ORDER — FLUCONAZOLE 150 MG/1
150 TABLET ORAL ONCE
Status: COMPLETED | OUTPATIENT
Start: 2018-05-07 | End: 2018-05-07

## 2018-05-07 RX ADMIN — ONDANSETRON 8 MG: 4 TABLET, ORALLY DISINTEGRATING ORAL at 17:17

## 2018-05-07 RX ADMIN — FLUCONAZOLE 150 MG: 150 TABLET ORAL at 18:33

## 2018-05-07 ASSESSMENT — ENCOUNTER SYMPTOMS
NAUSEA: 1
VOMITING: 0
DIARRHEA: 0
CHILLS: 0
ABDOMINAL PAIN: 0
FEVER: 0

## 2018-05-07 NOTE — DISCHARGE INSTRUCTIONS
Please stop your Augmentin as it is causing your vaginal symptoms of yeast infection.    With the medications of Flagyl and Bactrim, this may alter your INR and increase your bleeding related to Coumadin.  Please follow-up with your doctor tomorrow for altered dosing schedule of your warfarin.  They will need to check your INR more frequently while on these medications.

## 2018-05-07 NOTE — ED AVS SNAPSHOT
[facility] Emergency Department    [address]    [facility] [address]    Phone:  [phone]    Fax:  [phone]                                       Savanna Rehman   MRN: [id number]    Department:  [facility] Emergency Department   Date of Visit:  5/7/2018           After Visit Summary Signature Page     I have received my discharge instructions, and my questions have been answered. I have discussed any challenges I see with this plan with the nurse or doctor.    ..........................................................................................................................................  Patient/Patient Representative Signature      ..........................................................................................................................................  Patient Representative Print Name and Relationship to Patient    ..................................................               ................................................  Date                                            Time    ..........................................................................................................................................  Reviewed by Signature/Title    ...................................................              ..............................................  Date                                                            Time

## 2018-05-07 NOTE — ED TRIAGE NOTES
Arrives with multiple complaints, she was sent home with augmentin Friday for diverticulitis and UTI. Since leaving she has developed vaginal discharge, nausea and dry tongue. Pt a/ox 3. ABC's intact.

## 2018-05-07 NOTE — TELEPHONE ENCOUNTER
Reason for Disposition    Side (flank) or lower back pain present    Additional Information    Negative: Shock suspected (e.g., cold/pale/clammy skin, too weak to stand, low BP, rapid pulse)    Negative: Sounds like a life-threatening emergency to the triager    Negative: Followed a genital area injury    Negative: Followed a genital area injury (penis, scrotum)    Negative: Vaginal discharge    Negative: Pus (white, yellow) or bloody discharge from end of penis    Negative: [1] Taking antibiotic for urinary tract infection (UTI) AND [2] female    Negative: [1] Taking antibiotic for urinary tract infection (UTI) AND [2] male    Negative: [1] Discomfort (pain, burning or stinging) when passing urine AND [2] pregnant    Negative: [1] Discomfort (pain, burning or stinging) when passing urine AND [2] postpartum < 1 month    Negative: [1] Discomfort (pain, burning or stinging) when passing urine AND [2] female    Negative: [1] Discomfort (pain, burning or stinging) when passing urine AND [2] male    Negative: Pain or itching in the vulvar area    Negative: Pain in scrotum is main symptom    Negative: Blood in the urine is main symptom    Negative: Symptoms arising from use of a urinary catheter (Aguilar or Coude)    Negative: [1] Unable to urinate (or only a few drops) > 4 hours AND     [2] bladder feels very full (e.g., palpable bladder or strong urge to urinate)    Negative: [1] Decreased urination and [2] drinking very little AND [2] dehydration suspected (e.g., dark urine, no urine > 12 hours, very dry mouth, very lightheaded)    Negative: Patient sounds very sick or weak to the triager    Negative: Fever > 100.5 F (38.1 C)    Protocols used: URINARY SYMPTOMS-ADULT-    The [facility] is coming today for an assessment she can't miss that appointment or she could lose her benefits. She prefers to go to the ER rather than her primary care provider. She doesn't want to have to repeat herself.   Angela Trent  RN-RODRIGO [facility]

## 2018-05-07 NOTE — ED AVS SNAPSHOT
[facility] Emergency Department    [address]    [facility] [address]    Phone:  [phone]    Fax:  [phone]                                       Savanna Rehman   MRN: [id number]    Department:  [facility] Emergency Department   Date of Visit:  5/7/2018           Patient Information     Date Of Birth          1940        Your diagnoses for this visit were:     Adverse effect of drug, initial encounter     Vulvovaginal candidiasis     Diverticulitis of colon        You were seen by Darin Soria MD.      Follow-up Information     Follow up with Hilary Finney MD. Schedule an appointment as soon as possible for a visit in 1 day.    Specialty:  Family Practice    Contact information:    [facility]  [address]  [facility] [id number] [phone]          Discharge Instructions       Please stop your Augmentin as it is causing your vaginal symptoms of yeast infection.    With the medications of Flagyl and Bactrim, this may alter your INR and increase your bleeding related to Coumadin.  Please follow-up with your doctor tomorrow for altered dosing schedule of your warfarin.  They will need to check your INR more frequently while on these medications.        Discharge References/Attachments     YEAST INFECTION, VAGINAL (CANDIDA VAGINAL INFECTION) (ENGLISH)    DIVERTICULITIS (ENGLISH)      Your next 10 appointments already scheduled     May 11, 2018  2:00 PM CDT   Return Sleep Patient with Warner Mims MD   [facility]    [address]  [facility] [address] [phone]              24 Hour Appointment Hotline       To make an appointment at any [facility] (formerly [facility]), call [phone] ([phone]). If you don't have a family doctor or clinic, we will help you find one. [facility] are conveniently located to serve the needs of you and your  family.             Review of your medicines      START taking        Dose / Directions Last dose taken    fluconazole 150 MG tablet   Commonly known as:  DIFLUCAN   Dose:  150 mg   Quantity:  1 tablet        Take 1 tablet (150 mg) by mouth once for 1 dose   Refills:  0        metroNIDAZOLE 500 MG tablet   Commonly known as:  FLAGYL   Dose:  500 mg   Quantity:  21 tablet        Take 1 tablet (500 mg) by mouth 3 times daily for 7 days   Refills:  0        ondansetron 4 MG ODT tab   Commonly known as:  ZOFRAN ODT   Dose:  4 mg   Quantity:  15 tablet        Take 1 tablet (4 mg) by mouth every 6 hours as needed for nausea   Refills:  0        sulfamethoxazole-trimethoprim 800-160 MG per tablet   Commonly known as:  BACTRIM DS   Dose:  1 tablet   Quantity:  14 tablet        Take 1 tablet by mouth 2 times daily for 7 days   Refills:  0          Our records show that you are taking the medicines listed below. If these are incorrect, please call your family doctor or clinic.        Dose / Directions Last dose taken    ACETAMINOPHEN PO   Dose:  500 mg        Take 500 mg by mouth   Refills:  0        ADVIL PO   Dose:  400 mg        Take 400 mg by mouth every 8 hours as needed for moderate pain   Refills:  0        amoxicillin-clavulanate 875-125 MG per tablet   Commonly known as:  AUGMENTIN   Dose:  1 tablet   Quantity:  30 tablet        Take 1 tablet by mouth 3 times daily for 10 days   Refills:  0        BIOTIN 5000 5 MG Caps   Generic drug:  biotin        Refills:  0        glipiZIDE 2.5 MG 24 hr tablet   Commonly known as:  GLUCOTROL XL   Dose:  2.5 mg        Take 2.5 mg by mouth daily   Refills:  1        LORazepam 0.5 MG tablet   Commonly known as:  ATIVAN   Dose:  0.5 mg   Quantity:  12 tablet        Take 1 tablet (0.5 mg) by mouth every 4 hours as needed for anxiety or other (chest pain)   Refills:  0        OXYBUTYNIN CHLORIDE PO   Dose:  5 mg        Take 5 mg by mouth 2 times daily Clarified as immediate release  with Marcie./Patient   Refills:  0        pravastatin 20 MG tablet   Commonly known as:  PRAVACHOL   Dose:  20 mg        Take 20 mg by mouth At Bedtime ON HOLD   Refills:  1        ranitidine 150 MG capsule   Commonly known as:  ZANTAC        Take by mouth 2 times daily   Refills:  0        triamcinolone 0.1 % cream   Commonly known as:  KENALOG        Apply topically 2 times daily   Refills:  0        * WARFARIN SODIUM PO   Dose:  2.5 mg        Take 2.5 mg by mouth Take on Sun Mon, Wed, Fri   Refills:  0        * WARFARIN SODIUM PO   Dose:  3.75 mg        Take 3.75 mg by mouth Tues, Thus, Sat   Refills:  0        * Notice:  This list has 2 medication(s) that are the same as other medications prescribed for you. Read the directions carefully, and ask your doctor or other care provider to review them with you.            Prescriptions were sent or printed at these locations (4 Prescriptions)                   Other Prescriptions                Printed at Department/Unit printer (4 of 4)         fluconazole (DIFLUCAN) 150 MG tablet               metroNIDAZOLE (FLAGYL) 500 MG tablet               ondansetron (ZOFRAN ODT) 4 MG ODT tab               sulfamethoxazole-trimethoprim (BACTRIM DS) 800-160 MG per tablet                Procedures and tests performed during your visit     Wet prep      Orders Needing Specimen Collection     None      Pending Results     No orders found from 5/5/2018 to 5/8/2018.            Pending Culture Results     No orders found from 5/5/2018 to 5/8/2018.            Pending Results Instructions     If you had any lab results that were not finalized at the time of your Discharge, you can call the ED Lab Result RN at [phone]. You will be contacted by this team for any positive Lab results or changes in treatment. The nurses are available 7 days a week from 10A to 6:30P.  You can leave a message 24 hours per day and they will return your call.        Test Results From Your Hospital Stay         5/7/2018  5:43 PM      Component Results     Component    Specimen Description    Vagina    Wet Prep    Moderate  PMNs seen      Wet Prep    No Trichomonas seen    Wet Prep    No yeast seen    Wet Prep    No clue cells seen                Clinical Quality Measure: Blood Pressure Screening     Your blood pressure was checked while you were in the emergency department today. The last reading we obtained was  BP: (!) 138/98 . Please read the guidelines below about what these numbers mean and what you should do about them.  If your systolic blood pressure (the top number) is less than 120 and your diastolic blood pressure (the bottom number) is less than 80, then your blood pressure is normal. There is nothing more that you need to do about it.  If your systolic blood pressure (the top number) is 120-139 or your diastolic blood pressure (the bottom number) is 80-89, your blood pressure may be higher than it should be. You should have your blood pressure rechecked within a year by a primary care provider.  If your systolic blood pressure (the top number) is 140 or greater or your diastolic blood pressure (the bottom number) is 90 or greater, you may have high blood pressure. High blood pressure is treatable, but if left untreated over time it can put you at risk for heart attack, stroke, or kidney failure. You should have your blood pressure rechecked by a primary care provider within the next 4 weeks.  If your provider in the emergency department today gave you specific instructions to follow-up with your doctor or provider even sooner than that, you should follow that instruction and not wait for up to 4 weeks for your follow-up visit.        Thank you for choosing [facility]       Thank you for choosing [facility] for your care. Our goal is always to provide you with excellent care. Hearing back from our patients is one way we can continue to improve our services. Please take a few minutes to complete the written  "survey that you may receive in the mail after you visit with us. Thank you!        [employer] Information     [employer] lets you send messages to your doctor, view your test results, renew your prescriptions, schedule appointments and more. To sign up, go to [email or web address] . Click on \"Log in\" on the left side of the screen, which will take you to the Welcome page. Then click on \"Sign up Now\" on the right side of the page.     You will be asked to enter the access code listed below, as well as some personal information. Please follow the directions to create your username and password.     Your access code is: [email or web address]  Expires: 2018  4:27 PM     Your access code will  in 90 days. If you need help or a new code, please call your [facility] clinic or [phone].        Care EveryWhere ID     This is your Care EveryWhere ID. This could be used by other organizations to access your [facility] medical records  [id number]        Equal Access to Services     KAYLEIGH MCCLENDON : Hadrobin barrerao Somadeline, waaxda luqadaha, qaybta kaalmaerich ansari, hanny hendrickson . So [facility] [phone].    ATENCIÓN: Si habla español, tiene a maier disposición servicios gratuitos de asistencia lingüística. Llame al [phone].    We comply with applicable federal civil rights laws and [place] laws. We do not discriminate on the basis of race, color, national origin, age, disability, sex, sexual orientation, or gender identity.            After Visit Summary       This is your record. Keep this with you and show to your community pharmacist(s) and doctor(s) at your next visit.                  "

## 2018-05-07 NOTE — LETTER
May 7, 2018      To Whom It May Concern:      Savanna Rehman was seen in our Emergency Department today, 05/07/18.  I expect her condition to improve over the next day.  She may return to work/school when improved.    Sincerely,        Juliane Josue RN

## 2018-05-07 NOTE — ED PROVIDER NOTES
History     Chief Complaint:  Multiple Complaints     HPI   Savanna Rehman is a 77 year old female with a history of atrial fibrillation, coronary artery disease on Warfarin, congestive heart failure, and fibromyalgia who presents with multiple complaints. The patient states she was seen here on Friday 5/4/18 for nausea, vomiting, diarrhea, and abdominal pain where by she was seen by Zoltan Mcgregor CNP who sent the patient's blood-work and sent her for a CT scan, results below. The patient was diagnosed with mild diverticulitis, so she was discharged home with a prescription for Augmentin. Since then, the patient reports she has been having difficulty keeping the Augmentin down secondary to nausea. The patient notes she vomited once after the first dose, but denies any vomiting since then. Last night, the patient reports she noticed a foul-smelling vaginal discharge that persisted into today. Today, the patient notes her external genitalia seem more swollen and also reports some vaginal itching. She states she has not had a yeast infection in decades, but came back to the ED concerned for a yeast infection and persistent nausea. The patient denies any diarrhea, fever, or other acute symptoms.    5/4/18 CT Abdomen pelvis w/o contrast:  Mild or early diverticulitis near the rectosigmoid  junction. No free air, free fluid, or evidence of abscess.   As read by Radiology.       5/4/18 Laboratory:  CBC: WNL (WBC 10.0, HGB 15.3, )   CMP: Glucose 212(H), GFR 53(L), o/w WNL   UA: urobilinogen mg/dL 4.0(H), leukocyte esterase trace (A), WBC/HPF 8(H), bacteria few(A), squamous epithelial/HPF 8(H), mucous present (A), o/w Negative     Allergies:  Petroleum jelly: anaphylaxis  Aspirin: tongue swelling  Bacitracin: rash, swelling  Darvon: swelling  Dilaudid  Levaquin: tongue swelling  Neosporin: rash  Oxycodone: itching  Percodan: itching  Tramadol  Vicodin: itching  Xarelto  Adhesive tape: rash  Codeine:  rash    Medications:    Acetaminophen  Augmentin  Biotin  Glipizide  Ibuprofen  Lorazepam  Oxybutynin chloride  Pravastatin  Ranitidine  Warfarin    Past Medical History:    Anemia  Atrial fibrillation  Coronary artery disease  Chronic pain  Congestive heart failure  Degenerative disc disease  Gastroesophageal reflux disease  Fibromyalgia  Hiatal hernia  Pernicious anemia  Sleep apnea  Urinary incontinence  Vitamin D deficiency    Past Surgical History:    Appendectomy  Cholecystectomy  Heart cath, left  Hysterectomy total  Knee replacement  Laparoscopic Nissen fundoplication  Tonsillectomy  Transposition ulnar nerve    Family History:    History reviewed. No pertinent family history.     Social History:  Smoking status: Former smoker, 9/1/2014  Alcohol use: Yes, socially  PCP: Hilary Finney MD  Marital Status:  [2]     Review of Systems   Constitutional: Negative for chills and fever.   Gastrointestinal: Positive for nausea. Negative for abdominal pain, diarrhea and vomiting.   Genitourinary: Positive for vaginal discharge.   All other systems reviewed and are negative.    Physical Exam     Patient Vitals for the past 24 hrs:   BP Temp Temp src Pulse Resp SpO2   05/07/18 1826 (!) 145/111 - - - - -   05/07/18 1622 (!) 138/98 97.6  F (36.4  C) Temporal 79 16 99 %     Physical Exam    Constitutional:  Pleasant, age appropriate.       Resting comfortably in the bed.  HEENT:    Oropharynx is moist, without lesions or trismus.  Eyes:    Conjunctiva normal  Neck:    Supple, no meningismus.     CV:     Regular rate and rhythm.      No murmurs, rubs or gallops.     No lower extremity edema.  PULM:    Clear to auscultation bilateral.       No respiratory distress.      Good air exchange.  ABD:    Soft, non-distended.       Minimal tenderness in the LLQ.     Bowel sounds normal.     No pulsatile masses.       No rebound, guarding or rigidity.     No CVA tenderness.      No hepatosplenomegaly.  :     Marked erythema  and tenderness to labia majora and minora bilateral      Mild thin white discharge at vaginal introitus  MSK:     No gross deformity to all four extremities.   LYMPH:   No cervical lymphadenopathy.  NEURO:   Alert.  Good muscular tone, no atrophy.   Skin:    Warm, dry and intact.    Psych:    Mood is good and affect is appropriate.      Emergency Department Course   Laboratory:  Wet prep: WNL    Interventions:  1717: 8 mg Zofran PO  1833: 150 mg Diflucan PO    Emergency Department Course:  Past medical records, nursing notes, and vitals reviewed.  1651: I performed an exam of the patient and obtained history, as documented above.    1718: I performed a pelvic exam, results above.    1800: I rechecked the patient. Explained findings to the patient.    I rechecked the patient. Findings and plan explained to the patient. Patient discharged home with instructions regarding supportive care, medications, and reasons to return. The importance of close follow-up was reviewed.     Impression & Plan    Medical Decision Making:  Savanna Rehman is a 77 year old female who presents for adverse drug effect from Augmentin that she was prescribed for diverticulitis.  Augmentin was the most reasonable choice given her allergy profile and home medications.  With Zofran she was able to tolerate a p.o. challenge and has no clinical signs of dehydration on examination.  She does have marked vulvovaginal candidiasis although it is interesting that wet prep returned within normal limits.  Erythema to the labia are not consistent with fixed drug eruption, TEN, Florez-Elvin.  Patient was given dose of fluconazole in the ED and written for a repeat dose in 3 days if necessary.  She does have a history of allergy to fluoroquinolones.  I do not feel that continued use of Augmentin would be appropriate given her marked vulvovaginitis.  She will be transitioned to Bactrim and Flagyl.  I realized that this may augment her INR but it is the  most reasonable choice given her history.  She was instructed that she will need to closely follow-up with her primary care physician for augmentation of her warfarin dosing and close rechecks of INR.    Diagnosis:    ICD-10-CM   1. Adverse effect of drug, initial encounter T88.7XXA   2. Vulvovaginal candidiasis B37.3   3. Diverticulitis of colon K57.32     Disposition: Discharged to home    Discharge Medications:  New Prescriptions    FLUCONAZOLE (DIFLUCAN) 150 MG TABLET    Take 1 tablet (150 mg) by mouth once for 1 dose    METRONIDAZOLE (FLAGYL) 500 MG TABLET    Take 1 tablet (500 mg) by mouth 3 times daily for 7 days    ONDANSETRON (ZOFRAN ODT) 4 MG ODT TAB    Take 1 tablet (4 mg) by mouth every 6 hours as needed for nausea    SULFAMETHOXAZOLE-TRIMETHOPRIM (BACTRIM DS) 800-160 MG PER TABLET    Take 1 tablet by mouth 2 times daily for 7 days     Luz Dobson  5/7/2018   [facility] EMERGENCY DEPARTMENT    I, Luz Dobson, am serving as a scribe at 4:51 PM on 5/7/2018 to document services personally performed by Darin Soria MD based on my observations and the provider's statements to me.          Darin Soria MD  05/07/18 2017

## 2018-05-10 NOTE — PROGRESS NOTES
Scan of Split night 6/2/2011, [facility].  Scan of Office visit 6/12/2011

## 2018-05-11 ENCOUNTER — OFFICE VISIT (OUTPATIENT)
Dept: SLEEP MEDICINE | Facility: CLINIC | Age: 78
End: 2018-05-11
Payer: MEDICARE

## 2018-05-11 VITALS
HEIGHT: 68 IN | SYSTOLIC BLOOD PRESSURE: 113 MMHG | BODY MASS INDEX: 43.19 KG/M2 | DIASTOLIC BLOOD PRESSURE: 68 MMHG | RESPIRATION RATE: 16 BRPM | OXYGEN SATURATION: 95 % | WEIGHT: 285 LBS | HEART RATE: 80 BPM

## 2018-05-11 DIAGNOSIS — G47.19 EXCESSIVE DAYTIME SLEEPINESS: ICD-10-CM

## 2018-05-11 DIAGNOSIS — E66.01 MORBID OBESITY (H): ICD-10-CM

## 2018-05-11 DIAGNOSIS — G47.33 OSA (OBSTRUCTIVE SLEEP APNEA): Primary | ICD-10-CM

## 2018-05-11 DIAGNOSIS — I48.20 CHRONIC ATRIAL FIBRILLATION (H): ICD-10-CM

## 2018-05-11 PROCEDURE — 99215 OFFICE O/P EST HI 40 MIN: CPT | Performed by: FAMILY MEDICINE

## 2018-05-11 NOTE — PATIENT INSTRUCTIONS
Your BMI is Body mass index is 43.33 kg/(m^2).  Weight management is a personal decision.  If you are interested in exploring weight loss strategies, the following discussion covers the approaches that may be successful. Body mass index (BMI) is one way to tell whether you are at a healthy weight, overweight, or obese. It measures your weight in relation to your height.  A BMI of 18.5 to 24.9 is in the healthy range. A person with a BMI of 25 to 29.9 is considered overweight, and someone with a BMI of 30 or greater is considered obese. More than two-thirds of [place] adults are considered overweight or obese.  Being overweight or obese increases the risk for further weight gain. Excess weight may lead to heart disease and diabetes.  Creating and following plans for healthy eating and physical activity may help you improve your health.  Weight control is part of healthy lifestyle and includes exercise, emotional health, and healthy eating habits. Careful eating habits lifelong are the mainstay of weight control. Though there are significant health benefits from weight loss, long-term weight loss with diet alone may be very difficult to achieve- studies show long-term success with dietary management in less than 10% of people. Attaining a healthy weight may be especially difficult to achieve in those with severe obesity. In some cases, medications, devices and surgical management might be considered.  What can you do?  If you are overweight or obese and are interested in methods for weight loss, you should discuss this with your provider.     Consider reducing daily calorie intake by 500 calories.     Keep a food journal.     Avoiding skipping meals, consider cutting portions instead.    Diet combined with exercise helps maintain muscle while optimizing fat loss. Strength training is particularly important for building and maintaining muscle mass. Exercise helps reduce stress, increase energy, and improves fitness.  Increasing exercise without diet control, however, may not burn enough calories to loose weight.       Start walking three days a week 10-20 minutes at a time    Work towards walking thirty minutes five days a week     Eventually, increase the speed of your walking for 1-2 minutes at time    In addition, we recommend that you review healthy lifestyles and methods for weight loss available through the National [employer] of [employer] patient information sites:  http://win.niddk.nih.gov/publications/index.htm    And look into health and wellness programs that may be available through your health insurance provider, employer, local community center, or [employer] club.    Weight management plan: Patient was referred to their PCP to discuss a diet and exercise plan.  Your blood pressure was checked while you were in clinic today.  Please read the guidelines below about what these numbers mean and what you should do about them.  Your systolic blood pressure is the top number.  This is the pressure when the heart is pumping.  Your diastolic blood pressure is the bottom number.  This is the pressure in between beats.  If your systolic blood pressure is less than 120 and your diastolic blood pressure is less than 80, then your blood pressure is normal. There is nothing more that you need to do about it  If your systolic blood pressure is 120-139 or your diastolic blood pressure is 80-89, your blood pressure may be higher than it should be.  You should have your blood pressure re-checked within a year by a primary care provider.  If your systolic blood pressure is 140 or greater or your diastolic blood pressure is 90 or greater, you may have high blood pressure.  High blood pressure is treatable, but if left untreated over time it can put you at risk for heart attack, stroke, or kidney failure.  You should have your blood pressure re-checked by a primary care provider within the next four weeks.    1. CPAP-  WHAT DOES IT DO AND HOW  CAN I LEARN TO WEAR IT?                               BEFORE I START, CAN I WATCH A MOVIE TO GET A PLAN ON HOW TO USE CPAP?  https://www.youtube.com/watch?t=v6E10dk977Y      Continuous positive airway pressure, or CPAP, is the most effective treatment for obstructive sleep apnea. It works by blowing room air, through a mask, to hold your throat open. A decision to use CPAP is a major step forward in the pursuit of a healthier life. The successful use of CPAP will help you breathe easier, sleep better and live healthier. You can choose CPAP equipment from any durable medical equipment provider that meets your needs.  Using CPAP can be a positive experience if you keep these brown points in mind:  1. Commitment  CPAP is not a quick fix for your problem. It involves a long-term commitment to improve your sleep and your health.    2. Communication  Stay in close communication with both your sleep doctor and your CPAP supplier. Ask lots of questions and seek help when you need it.    3. Consistency  Use CPAP all night, every night and for every nap. You will receive the maximum health benefits from CPAP when you use it every time that you sleep. This will also make it easier for your body to adjust to the treatment.    4. Correction  The first machine and mask that you try may not be the best ones for you. Work with your sleep doctor and your CPAP supplier to make corrections to your equipment selection. Ask about trying a different type of machine or mask if you have ongoing problems. Make sure that your mask is a good fit and learn to use your equipment properly.    5. Challenge  Tell a family member or close friend to ask you each morning if you used your CPAP the previous night. Have someone to challenge you to give it your best effort.    6. Connection   Your adjustment to CPAP will be easier if you are able to connect with others who use the same treatment. Ask your sleep doctor if there is a support group in your  "area for people who have sleep apnea, or look for one on the Internet.  7. Comfort   Increase your level of comfort by using a saline spray, decongestant or heated humidifier if CPAP irritates your nose, mouth or throat. Use your unit's \"ramp\" setting to slowly get used to the air pressure level. There may be soft pads you can buy that will fit over your mask straps. Look on www.CPAP.com for accessories that can help make CPAP use more comfortable.  8. Cleaning   Clean your mask, tubing and headgear on a regular basis. Put this time in your schedule so that you don't forget to do it. Check and replace the filters for your CPAP unit and humidifier.    9. Completion   Although you are never finished with CPAP therapy, you should reward yourself by celebrating the completion of your first month of treatment. Expect this first month to be your hardest period of adjustment. It will involve some trial and error as you find the machine, mask and pressure settings that are right for you.    10. Continuation  After your first month of treatment, continue to make a daily commitment to use your CPAP all night, every night and for every nap.    CPAP-Tips to starting with success:  Begin using your CPAP for short periods of time during the day while you watch TV or read.    Use CPAP every night and for every nap. Using it less often reduces the health benefits and makes it harder for your body to get used to it.    Make small adjustments to your mask, tubing, straps and headgear until you get the right fit. Tightening the mask may actually worsen the leak.  If it leaves significant marks on your face or irritates the bridge of your nose, it may not be the best mask for you.  Speak with the person who supplied the mask and consider trying other masks. Insurances will allow you to try different masks during the first month of starting CPAP.  Insurance also covers a new mask, hose and filter about every 6 months.    Use a saline " nasal spray to ease mild nasal congestion. Neti-Pot or saline nasal rinses may also help. Nasal gel sprays can help reduce nasal dryness.  Biotene mouthwash can be helpful to protect your teeth if you experience frequent dry mouth.  Dry mouth may be a sign of air escaping out of your mouth or out of the mask in the case of a full face mask.  Speak with your provider if you expect that is the case.     Take a nasal decongestant to relieve more severe nasal or sinus congestion.  Do not use Afrin (oxymetazoline) nasal spray more than 3 days in a row.  Speak with your sleep doctor if your nasal congestion is chronic.    Use a heated humidifier that fits your CPAP model to enhance your breathing comfort. Adjust the heat setting up if you get a dry nose or throat, down if you get condensation in the hose or mask.  Position the CPAP lower than you so that any condensation in the hose drains back into the machine rather than towards the mask.    Try a system that uses nasal pillows if traditional masks give you problems.    Clean your mask, tubing and headgear once a week. Make sure the equipment dries fully.    Regularly check and replace the filters for your CPAP unit and humidifier.    Work closely with your sleep provider and your CPAP supplier to make sure that you have the machine, mask and air pressure setting that works best for you. It is better to stop using it and call your provider to solve problems than to lay awake all night frustrated with the device.    Daytime naps are not advised, but use the PAP device if taking naps. Many insurances require that we prove you are using the PAP device at least 4 hours on at least 70% of nights over a 30 day period. We have 90 days to meet those criteria.    You can get new supplies (mask, hose and filter) for your device every 3-6 months (covered by insurance). You do not need to get supplies that often, but they are available if you would like them. You may exchange the  mask once within the first month if you feel the initial mask does not fit well. Please, contact your medical equipment provider for equipment issues.    Please let me know if you have any snoring, daytime sleepiness, or poor sleep quality. We will want to make sure your PAP device is adequately treating your condition.    There is a website called CPAP.com that has accessories that may make CPAP use easier. Please visit it at your convenience.    Please, schedule follow up visit with the nurse (she will coming into the room and meet with you).     Thank you!    Warner Mims MD, MPH  Clinical Sleep and Occupational / Environmental Medicine

## 2018-05-11 NOTE — PROGRESS NOTES
Sleep Center [facility]  Outpatient Sleep Medicine Follow Up Visit  May 11, 2018    Name: Savanna Rehman MRN# [id number]   Age: 77 year old YOB: 1940     Date of Follow Up Visit: May 11, 2018  Consultation is requested by: Dottie Geller MD  [address]  [place] [address]  Primary care provider: Hilary Finney    Patient is accompanied by: Patient presents alone today.       Reason for Sleep Consult:     Savanna Rehman is a 77 year old female patient that presents here for an CRSITIANA follow up evaluation.         Assessment and Plan:     Summary Sleep Diagnoses:    (1) Severe CRISTIANA (AHI of 33.9 events per hour)   (2) Chronic Atrial Fibrillation  (3) Morbid Obesity  (4) EDS    Summary Recommendations / Discussion:    During the initial visit, this patient was diagnosed with CRISTIANA of unknown severity in the past. Unfortunately, official report was not available and the patient did not using the PAP devise for a long time. During this initial visit, this patient still had sxs, hx, and physical findings that suggested the diagnosis of a sleep disordered breathing. In addition, she had a high pre-test probability of CRISTIANA. Given that at that point the severity of unknown and the patient's medical history, another PSG sleep study was ordered. However, due to transportation issues, this was never completed. The patient was able to get the original PSG sleep study from 2011 demonstrating severe CRISTIANA with an AHI of 33.9 events per hour. As such, at this point another sleep study is not necessary (especially because the patient was banned from our sleep centers due to repeated cancellations). Recent echocardiogram showed an adequate LVEF at 55 - 60%. As such, the patient will be started on APAP with minimum pressure of 5 cmH2O and maximum pressure of 10 cmH2O (narrow range preferred due to cardiac history). Once again, today the nature and pathophysiology of CRISTIANA and hypoventilation syndrome were  discussed. The different treatment options for CRISTIANA as well as hypoventilation syndrome were also reviewed and explained again today. PAP compliance was discussed and encouraged. Lifestyle recommendations including healthy dietary and exercising habits were discussed. Pt will follow up with me after having completed 4 weeks of PAP therapy. Before the follow up visit, the patient will complete an overnight oximetry test with the PAP device in place to assure no sleep associated hypoxemia or suggested hypoventilation. If any abnormalities are noted, another PSG sleep study may be necessary.    Of note, once the patient completes 4 weeks of PAP therapy, the APAP may be set at a fixed pressure depending on the PAP download (CPAP at fixed pressure is preferred for this patient with chronic atrial fibrillation).    Coding:  (G47.33) CRISTIANA (obstructive sleep apnea)  (primary encounter diagnosis)  (I48.2) Chronic atrial fibrillation (H)  (E66.01) Morbid obesity (H)  (G47.19) Excessive daytime sleepiness    Counseling included a comprehensive review of diagnostic and therapeutic strategies as well as risks of inadequate therapy.    Educational materials provided in instructions. The patient was instructed to avoid driving or operating any heavy machinery when experiencing drowsiness.    All questions and concerns were addressed today. Pt agrees and understands the assessment and plan.         History of Present Illness:   Savanna Rehman is a 77 year old  RHD female pt with PMH of Diaphragmatic hernia, hiatal hernia, morbid obesity, fibromyalgia, DM type 2, dyslipidemia, chronic atrial fibrillation, chronic diastolic CHF, and previously diagnosed CRISTIANA currently on PAP therapy presents for an CRISTIANA follow up visit.     As previously explained, this patient was evaluated in 2016 due to CP. Cardiac catheterization performed then showed no obstructive disease. The patient underwent a recent echocardiogram that demonstrated LV  with normal size and systolic function with EF estimated at 55 - 60% (see below of summary of echocardiogram). I was asked to evaluated this patient for possible CRISTIANA as well as hypoventilation syndrome by Dr Dan.     The patient was diagnosed with CRISTIANA over 10 yrs ago while she was living in [place]. Unfortunately, during the initial visit, the patient did not remember where the study was conducted and the official report was no available. Since the diagnosis, the patient was treated with PAP therapy. Pt was not using the PAP device, but he wished to start therapy once gain. Without the PAP device, the patient reported snoring as well as gasping / choking for air. Pt sleeps alone and she was not sure if she has witnessed apneas. Pt reported non-restorative sleep with sleep inertia. She also reported EDS with no inadvertent naps. Pt also reported a dull cephalgia present mainly in the morning. The cephalgia is localized in the frontal area and resolves after a few hours of being awake. Pt reported nocturia x 4 to 5 throughout the night. The patient denied any GERD sxs, CP, SOB, positional dyspnea, or any other sxs or concerns. Pt does not drive.    During the initial visit, the pt explained that she did not use the PAP device for many years due to claustrophobia. At this point, the pt feels more comfortable with nasal pillows and thinks she could be compliant with the therapy with this interface.    The patient schedule many PSG sleep study and cancel all of those due to transportation issues. Although the patient was banned from the sleep centers due to repeated appointment cancellations, fortunately, she was able to obtained her original PSG study. Study conducted in 2011 in [facility] demonstrated severe CRISTIANA with an AHI of 33.9 events per hour. During this same study, a titration sleep study took place and the patient was optimally titrated with a CPAP at final pressure of 6  cmH2O.     SLEEP-WAKE SCHEDULE:     Savanna Rehman      -Describes herself as a night person; prefers to go to sleep at 11:00 PM and wakes up at 7:00 AM.      -Naps 3-4 times per week for 30-60 minutes, feels refreshed after naps; takes no inadvertent naps.      -ON WEEKDAYS, goes to sleep at 11:00 PM during the week; awakens 7:30 AM with an alarm; falls asleep in 10 minutes; denies difficulty falling asleep.      -ON WEEKENDS, goes to sleep at 11:00 PM and wakes up at 7:30 AM with an alarm; falls asleep in 10 minutes.        -Awakens 2-4 times a night for less 5 minutes before falling back to sleep; awakens to go to the bathroom.      BEDTIME ACTIVITIES AND SHIFT WORK:    Savanna Rehman     -Bedtime Activities and Other Sleeping Information: Pt lives with her cat. Pt sleeps alone. The cat sleeps in the room with the patient. Pt sleeps on her sides. Pt reads, watches TV, and uses cell phone in bed.     -Occupation: Retired     [employer]        -SLEEP APNEA: Stopbang score: 7 / 8        -SLEEPINESS: Spencer sleepiness scale (ESS):  6 / 24 (initial score)    Drowsy driving / near accidents: Pt does not drive    Consequences: EDS and non-restorative sleep.    SLEEP COMPLAINTS:  Cardio-respiratory     -Snoring: Significant snoring reported    -Dyspnea: Pt denies having any witnessed apneas or dyspnea   -Morning headaches or confusion: Cephalgia reported (see above)   -Coexisting Lung disease: Denies diagnosed or known lung disease at this time     -Coexisting Heart disease: Atrial fibrillation     -Does patient have a bed partner: Patient sleeps alone   -Has bed partner been sleeping separately because of snoring:  N/A            RLS Screen:    -When you try to relax in the evening or sleep at night, do you ever have unpleasant, restless feelings in your legs that can be relieved by walking or movement? None Reported     -Periodic limb movement: None Reported    Narcolepsy:     - Denies sudden urges of sleep attacks   -  Denies cataplexy   - Denies sleep paralysis    - Denies hallucinations    - Denies feeling refreshed after a nap.    Sleep Behaviors:   - Denies leg symptoms/movements   - Denies motor restlessness   - Denies night terrors   - Denies bruxism   - Denies automatic behaviors    Other Subjective Complaints:   - Denies anxiety or rumination    - Denies pain and discomfort at night   - Denies waking up with heart pounding or racing   - Denies GERD or aspiration         Parasomnia:    -NREM - Denies recurrent persistent confusional arousal, night eating, sleep walking or sleep terrors. Pt uses to sleepwalking as a child. This resolved.      -REM - Denies dream enactment or injuries.     -Driving Accident or Near Accidents: Pt does not drive         Medications:     Current Outpatient Prescriptions   Medication Sig     ACETAMINOPHEN PO Take 500 mg by mouth     amoxicillin-clavulanate (AUGMENTIN) 875-125 MG per tablet Take 1 tablet by mouth 3 times daily for 10 days     biotin (BIOTIN 5000) 5 MG CAPS      glipiZIDE (GLUCOTROL XL) 2.5 MG 24 hr tablet Take 2.5 mg by mouth daily     Ibuprofen (ADVIL PO) Take 400 mg by mouth every 8 hours as needed for moderate pain     LORazepam (ATIVAN) 0.5 MG tablet Take 1 tablet (0.5 mg) by mouth every 4 hours as needed for anxiety or other (chest pain) (Patient taking differently: Take 0.5 mg by mouth as needed for anxiety or other (chest pain) )     metroNIDAZOLE (FLAGYL) 500 MG tablet Take 1 tablet (500 mg) by mouth 3 times daily for 7 days     OXYBUTYNIN CHLORIDE PO Take 5 mg by mouth 2 times daily Clarified as immediate release with [facility]./Patient     pravastatin (PRAVACHOL) 20 MG tablet Take 20 mg by mouth At Bedtime ON HOLD      ranitidine (ZANTAC) 150 MG capsule Take by mouth 2 times daily     sulfamethoxazole-trimethoprim (BACTRIM DS) 800-160 MG per tablet Take 1 tablet by mouth 2 times daily for 7 days     triamcinolone (KENALOG) 0.1 % cream Apply topically 2 times daily      WARFARIN SODIUM PO Take 2.5 mg by mouth Take on Sun Mon, Wed, Fri     WARFARIN SODIUM PO Take 3.75 mg by mouth Tues, Thus, Sat     No current facility-administered medications for this visit.      Facility-Administered Medications Ordered in Other Visits   Medication     nitroglycerin 100 MCG/ML injection      Allergies   Allergen Reactions     Augmented Betamethasone Diprop [Betamethasone] Other (See Comments)     Severe yeast infection     Petroleum Jelly [Petrolatum] Anaphylaxis     Rash and swelling     Shellfish-Derived Products Anaphylaxis     Tongue swelling     Aspirin Swelling     tiongue swelling     Bacitracin      Rash swelling     Coumadin [Warfarin] Swelling     Leg swelling     Darvon [Propoxyphene] Swelling     Throat closes     Dilaudid [Hydromorphone]      Levaquin [Levofloxacin] Swelling     Tongue swelling     Neosporin [Neomycin-Polymyx-Gramicid] Swelling     rash     Oxycodone      Severe itching     Percodan [Oxycodone-Aspirin]      Severe itching     Tramadol      Vicodin [Hydrocodone-Acetaminophen]      Severe itching       Xarelto [Rivaroxaban]      Adhesive Tape Rash     Band aids      Codeine Rash          Past Medical History:     Denies needing any 02 supplement at night.    Past Medical History:   Diagnosis Date     Anemia     Iron Deficiency anemia     Atrial fibrillation (H)      CAD (coronary artery disease)     non-obstructive     Chronic pain     neck, low back, legs     Congestive heart failure (H)      Degenerative disk disease      Fibromyalgia      Gastro-oesophageal reflux disease      Hiatal hernia      Neuropathy      Pernicious anemia      Sleep apnea     uses CPAP.     Urinary incontinence      Vitamin D deficiency            Past Surgical History:    Admits previous upper airway surgery.     Past Surgical History:   Procedure Laterality Date     APPENDECTOMY       CHOLECYSTECTOMY       COLONOSCOPY  3/15/2011     CORONARY ANGIOGRAPHY ADULT ORDER       HEART CATH LEFT  HEART CATH  12/30/16    medication management     HYSTERECTOMY TOTAL ABDOMINAL       Knee replacement NOS Left      LAPAROSCOPIC NISSEN FUNDOPLICATION N/A 2/4/2015    Procedure: LAPAROSCOPIC NISSEN FUNDOPLICATION;  Surgeon: Armando Ansari MD;  Location: [facility] OR     TONSILLECTOMY       TRANSPOSITION ULNAR NERVE (ELBOW)            Social History:     Social History   Substance Use Topics     Smoking status: Former Smoker     Types: Cigarettes     Quit date: 9/1/2014     Smokeless tobacco: Not on file     Alcohol use Yes      Comment: socially.     Chemical History:     Tobacco: Quit smoking about 3 yrs ago     Uses 1 sodas/day.    Supplements for wakefulness: Patient does not use any supplements to stay awake    EtOH: Only occasional use  Recreational Drugs: Patient denies using any recreational drugs     Psych Hx:   Current dangers to self or others: No. Pt denies any SI / HI, hallucinations, or delusions         Family History:     Family History   Problem Relation Age of Onset     Unknown/Adopted No family hx of       Sleep Family Hx:       RLS - None reported   CRISTIANA - Siblings  Insomnia - None reported  Parasomnia - None reported         Review of Systems:     A complete 10 point review of systems was negative other than HPI or as commented below:     CONSTITUTIONAL: NEGATIVE for weight gain/loss, fever, chills, sweats or night sweats, drug allergies.  EYES: NEGATIVE for changes in vision, blind spots, double vision.  ENT: NEGATIVE for ear pain, sore throat, sinus pain, post-nasal drip, runny nose, bloody nose  CARDIAC: NEGATIVE for fast chest pain or pressure, breathlessness when lying flat, swollen legs or swollen feet. Heartbeats or fluttering in chest reported.  NEUROLOGIC: NEGATIVE headaches, weakness or numbness in the arms or legs.  DERMATOLOGIC: NEGATIVE for rashes, new moles or change in mole(s)  PULMONARY: NEGATIVE SOB at rest, dry cough, productive cough, coughing up blood, wheezing or whistling when  "breathing. Pt endorses SOB with activity.  GASTROINTESTINAL: NEGATIVE for nausea or vomitting, loose or watery stools, fat or grease in stools, constipation, abdominal pain, bowel movements black in color or blood noted.  GENITOURINARY: NEGATIVE for pain during urination, blood in urine, urinating more frequently than usual, irregular menstrual periods.  MUSCULOSKELETAL: NEGATIVE for swollen joints. Pt reports muscle as well as joint pain.  ENDOCRINE: NEGATIVE for increased thirst or urination, diabetes.  LYMPHATIC: NEGATIVE for swollen lymph nodes, lumps or bumps in the breasts or nipple discharge.         Physical Examination:   /68  Pulse 80  Resp 16  Ht 1.727 m (5' 8\")  Wt 129.3 kg (285 lb)  SpO2 95%  BMI 43.33 kg/m2     VS: Reviewed and normal.  General: Alert, oriented, not in distress. Dressed casually; Good eye contact; Comfortably sitting in a chair; in no apparent distress  Lungs: Both hemithoraces are symmetrical, normal to palpation, no dullness to percussion, auscultation of lungs revealed normal breath sounds with no expirium prolongation, wheezing, rhonci and crackles.  CVS: Normal S1 and S2 heart sounds with no extra heart sounds. No murmur, rubs, or clicks. Normal peripheral pulses throughout with no obvious peripheral edema.  Psychiatry: Mood and affect are appropriate. Euthymic with affect congruent with full range and intensity. No SI/HI with adequate insight and judgement.          Data: All pertinent previous laboratory data reviewed     No results found for: PH, PHARTERIAL, PO2, [id number], SAT, PCO2, HCO3, BASEEXCESS, DIVINA, BEB  No results found for: TSH  Lab Results   Component Value Date     (H) 05/04/2018     (H) 01/14/2018     Lab Results   Component Value Date    HGB 15.3 05/04/2018    HGB 15.4 01/14/2018     Lab Results   Component Value Date    BUN 16 05/04/2018    BUN 15 01/14/2018    CR 1.01 05/04/2018    CR 0.87 01/14/2018     Echocardiology: "    -Echocardiogram obtained on 05/02/2017 showed LV with normal size and normal systolic function with EF estimated at 55 - 60%. The RV showed normal size and function. The LA was normal in size. The RA was borderline enlarged. No obvious atrial shunt was noted. Trace mitral regurgitation noted. Mild tricuspid regurgitation present. Mild aortic valve regurgitation also present. Trace pulmonic valve regurgitation also noted. The rhythm was atrial fibrillation with controlled ventricular rate.    Chest x-ray:    -Chest x-ray films obtained on 04/12/2017 showed mildly prominent heart with otherwise no effusion or pneumothorax.    PFT:    -PFT obtained on 04/20/2017 showed mild obstruction without significant bronchodilator response. Diffusion capacity was normal. FVC was 73%, FEV was 70%, FEV1/FVC was 77%.    Laboratory Studies:   Component Value Flag Ref Range Units Status Collected Lab   Sodium 138  133 - 144 mmol/L Final 08/23/2017  3:04 PM FrRdHs   Potassium 4.2  3.4 - 5.3 mmol/L Final 08/23/2017  3:04 PM FrRdHs   Chloride 104  94 - 109 mmol/L Final 08/23/2017  3:04 PM FrRdHs   Carbon Dioxide 27  20 - 32 mmol/L Final 08/23/2017  3:04 PM FrRdHs   Anion Gap 7  3 - 14 mmol/L Final 08/23/2017  3:04 PM FrRdHs   Glucose 126 (H) 70 - 99 mg/dL Final 08/23/2017  3:04 PM FrRdHs   Urea Nitrogen 15  7 - 30 mg/dL Final 08/23/2017  3:04 PM FrRdHs   Creatinine 0.86  0.52 - 1.04 mg/dL Final 08/23/2017  3:04 PM FrRdHs   GFR Estimate 64  >60 mL/min/1.7m2 Final 08/23/2017  3:04 PM FrRdHs     Component Value Flag Ref Range Units Status Collected Lab   WBC 6.6  4.0 - 11.0 10e9/L Final 04/14/2017  9:54 AM FrStHsLb   RBC Count 4.60  3.8 - 5.2 10e12/L Final 04/14/2017  9:54 AM FrStHsLb   Hemoglobin 13.0  11.7 - 15.7 g/dL Final 04/14/2017  9:54 AM FrStHsLb   Hematocrit 40.3  35.0 - 47.0 % Final 04/14/2017  9:54 AM FrStHsLb   MCV 88  78 - 100 fl Final 04/14/2017  9:54 AM FrStHsLb   MCH 28.3  26.5 - 33.0 pg Final 04/14/2017  9:54 AM FrStHsLb    MCHC 32.3  31.5 - 36.5 g/dL Final 04/14/2017  9:54 AM FrStHsLb   RDW 15.7 (H) 10.0 - 15.0 % Final 04/14/2017  9:54 AM FrStHsLb   Platelet Count 229  150 - 450 10e9/L Final 04/14/2017  9:54 AM FrStHsLb   Diff Method     Final 04/14/2017  9:54 AM FrStHsLb   Automated Method   % Neutrophils 55.4   % Final 04/14/2017  9:54 AM FrStHsLb   % Lymphocytes 28.2   % Final 04/14/2017  9:54 AM FrStHsLb   % Monocytes 9.9   % Final 04/14/2017  9:54 AM FrStHsLb   % Eosinophils 5.6   % Final 04/14/2017  9:54 AM FrStHsLb   % Basophils 0.6   % Final 04/14/2017  9:54 AM FrStHsLb   % Immature Granulocytes 0.3   % Final 04/14/2017  9:54 AM FrStHsLb   Nucleated RBCs 0  0 /100 Final 04/14/2017  9:54 AM FrStHsLb   Absolute Neutrophil 3.7  1.6 - 8.3 10e9/L Final 04/14/2017  9:54 AM FrStHsLb   Absolute Lymphocytes 1.9  0.8 - 5.3 10e9/L Final 04/14/2017  9:54 AM FrStHsLb   Absolute Monocytes 0.7  0.0 - 1.3 10e9/L Final 04/14/2017  9:54 AM FrStHsLb   Absolute Eosinophils 0.4  0.0 - 0.7 10e9/L Final 04/14/2017  9:54 AM FrStHsLb   Absolute Basophils 0.0  0.0 - 0.2 10e9/L Final 04/14/2017  9:54 AM FrStHsLb   Abs Immature Granulocytes 0.0  0 - 0.4 10e9/L Final 04/14/2017  9:54 AM FrStHsLb   Absolute Nucleated RBC 0.0    Final 04/14/2017  9:54 AM FrStHsLb     Warner Mims MD, MPH  Clinical Sleep Medicine    Total time spent with patient: 40 min. Over >50% of the time was spent for face to face counseling, education, and evaluation.

## 2018-05-11 NOTE — MR AVS SNAPSHOT
After Visit Summary   5/11/2018    Savanna Rehman    MRN: [id number]           Patient Information     Date Of Birth          1940        Visit Information        Provider Department      5/11/2018 2:00 PM Warner Mims MD [facility]        Today's Diagnoses     CRISTIANA (obstructive sleep apnea)    -  1    Chronic atrial fibrillation (H)        Morbid obesity (H)        Excessive daytime sleepiness          Care Instructions      Your BMI is Body mass index is 43.33 kg/(m^2).  Weight management is a personal decision.  If you are interested in exploring weight loss strategies, the following discussion covers the approaches that may be successful. Body mass index (BMI) is one way to tell whether you are at a healthy weight, overweight, or obese. It measures your weight in relation to your height.  A BMI of 18.5 to 24.9 is in the healthy range. A person with a BMI of 25 to 29.9 is considered overweight, and someone with a BMI of 30 or greater is considered obese. More than two-thirds of American adults are considered overweight or obese.  Being overweight or obese increases the risk for further weight gain. Excess weight may lead to heart disease and diabetes.  Creating and following plans for healthy eating and physical activity may help you improve your health.  Weight control is part of healthy lifestyle and includes exercise, emotional health, and healthy eating habits. Careful eating habits lifelong are the mainstay of weight control. Though there are significant health benefits from weight loss, long-term weight loss with diet alone may be very difficult to achieve- studies show long-term success with dietary management in less than 10% of people. Attaining a healthy weight may be especially difficult to achieve in those with severe obesity. In some cases, medications, devices and surgical management might be considered.  What can you do?  If you are overweight or  obese and are interested in methods for weight loss, you should discuss this with your provider.     Consider reducing daily calorie intake by 500 calories.     Keep a food journal.     Avoiding skipping meals, consider cutting portions instead.    Diet combined with exercise helps maintain muscle while optimizing fat loss. Strength training is particularly important for building and maintaining muscle mass. Exercise helps reduce stress, increase energy, and improves fitness. Increasing exercise without diet control, however, may not burn enough calories to loose weight.       Start walking three days a week 10-20 minutes at a time    Work towards walking thirty minutes five days a week     Eventually, increase the speed of your walking for 1-2 minutes at time    In addition, we recommend that you review healthy lifestyles and methods for weight loss available through the National Institutes of Health patient information sites:  http://win.niddk.nih.gov/publications/index.htm    And look into health and wellness programs that may be available through your health insurance provider, employer, local community center, or kanchan club.    Weight management plan: Patient was referred to their PCP to discuss a diet and exercise plan.  Your blood pressure was checked while you were in clinic today.  Please read the guidelines below about what these numbers mean and what you should do about them.  Your systolic blood pressure is the top number.  This is the pressure when the heart is pumping.  Your diastolic blood pressure is the bottom number.  This is the pressure in between beats.  If your systolic blood pressure is less than 120 and your diastolic blood pressure is less than 80, then your blood pressure is normal. There is nothing more that you need to do about it  If your systolic blood pressure is 120-139 or your diastolic blood pressure is 80-89, your blood pressure may be higher than it should be.  You should have  your blood pressure re-checked within a year by a primary care provider.  If your systolic blood pressure is 140 or greater or your diastolic blood pressure is 90 or greater, you may have high blood pressure.  High blood pressure is treatable, but if left untreated over time it can put you at risk for heart attack, stroke, or kidney failure.  You should have your blood pressure re-checked by a primary care provider within the next four weeks.    1. CPAP-  WHAT DOES IT DO AND HOW CAN I LEARN TO WEAR IT?                               BEFORE I START, CAN I WATCH A MOVIE TO GET A PLAN ON HOW TO USE CPAP?  https://www.Mobiplex.com/watch?l=q9T17ml177Y      Continuous positive airway pressure, or CPAP, is the most effective treatment for obstructive sleep apnea. It works by blowing room air, through a mask, to hold your throat open. A decision to use CPAP is a major step forward in the pursuit of a healthier life. The successful use of CPAP will help you breathe easier, sleep better and live healthier. You can choose CPAP equipment from any durable medical equipment provider that meets your needs.  Using CPAP can be a positive experience if you keep these brown points in mind:  1. Commitment  CPAP is not a quick fix for your problem. It involves a long-term commitment to improve your sleep and your health.    2. Communication  Stay in close communication with both your sleep doctor and your CPAP supplier. Ask lots of questions and seek help when you need it.    3. Consistency  Use CPAP all night, every night and for every nap. You will receive the maximum health benefits from CPAP when you use it every time that you sleep. This will also make it easier for your body to adjust to the treatment.    4. Correction  The first machine and mask that you try may not be the best ones for you. Work with your sleep doctor and your CPAP supplier to make corrections to your equipment selection. Ask about trying a different type of machine  "or mask if you have ongoing problems. Make sure that your mask is a good fit and learn to use your equipment properly.    5. Challenge  Tell a family member or close friend to ask you each morning if you used your CPAP the previous night. Have someone to challenge you to give it your best effort.    6. Connection   Your adjustment to CPAP will be easier if you are able to connect with others who use the same treatment. Ask your sleep doctor if there is a support group in your area for people who have sleep apnea, or look for one on the Internet.  7. Comfort   Increase your level of comfort by using a saline spray, decongestant or heated humidifier if CPAP irritates your nose, mouth or throat. Use your unit's \"ramp\" setting to slowly get used to the air pressure level. There may be soft pads you can buy that will fit over your mask straps. Look on www.CPAP.com for accessories that can help make CPAP use more comfortable.  8. Cleaning   Clean your mask, tubing and headgear on a regular basis. Put this time in your schedule so that you don't forget to do it. Check and replace the filters for your CPAP unit and humidifier.    9. Completion   Although you are never finished with CPAP therapy, you should reward yourself by celebrating the completion of your first month of treatment. Expect this first month to be your hardest period of adjustment. It will involve some trial and error as you find the machine, mask and pressure settings that are right for you.    10. Continuation  After your first month of treatment, continue to make a daily commitment to use your CPAP all night, every night and for every nap.    CPAP-Tips to starting with success:  Begin using your CPAP for short periods of time during the day while you watch TV or read.    Use CPAP every night and for every nap. Using it less often reduces the health benefits and makes it harder for your body to get used to it.    Make small adjustments to your mask, " tubing, straps and headgear until you get the right fit. Tightening the mask may actually worsen the leak.  If it leaves significant marks on your face or irritates the bridge of your nose, it may not be the best mask for you.  Speak with the person who supplied the mask and consider trying other masks. Insurances will allow you to try different masks during the first month of starting CPAP.  Insurance also covers a new mask, hose and filter about every 6 months.    Use a saline nasal spray to ease mild nasal congestion. Neti-Pot or saline nasal rinses may also help. Nasal gel sprays can help reduce nasal dryness.  Biotene mouthwash can be helpful to protect your teeth if you experience frequent dry mouth.  Dry mouth may be a sign of air escaping out of your mouth or out of the mask in the case of a full face mask.  Speak with your provider if you expect that is the case.     Take a nasal decongestant to relieve more severe nasal or sinus congestion.  Do not use Afrin (oxymetazoline) nasal spray more than 3 days in a row.  Speak with your sleep doctor if your nasal congestion is chronic.    Use a heated humidifier that fits your CPAP model to enhance your breathing comfort. Adjust the heat setting up if you get a dry nose or throat, down if you get condensation in the hose or mask.  Position the CPAP lower than you so that any condensation in the hose drains back into the machine rather than towards the mask.    Try a system that uses nasal pillows if traditional masks give you problems.    Clean your mask, tubing and headgear once a week. Make sure the equipment dries fully.    Regularly check and replace the filters for your CPAP unit and humidifier.    Work closely with your sleep provider and your CPAP supplier to make sure that you have the machine, mask and air pressure setting that works best for you. It is better to stop using it and call your provider to solve problems than to lay awake all night frustrated  with the device.    Daytime naps are not advised, but use the PAP device if taking naps. Many insurances require that we prove you are using the PAP device at least 4 hours on at least 70% of nights over a 30 day period. We have 90 days to meet those criteria.    You can get new supplies (mask, hose and filter) for your device every 3-6 months (covered by insurance). You do not need to get supplies that often, but they are available if you would like them. You may exchange the mask once within the first month if you feel the initial mask does not fit well. Please, contact your medical equipment provider for equipment issues.    Please let me know if you have any snoring, daytime sleepiness, or poor sleep quality. We will want to make sure your PAP device is adequately treating your condition.    There is a website called CPAP.com that has accessories that may make CPAP use easier. Please visit it at your convenience.    Please, schedule follow up visit with the nurse (she will coming into the room and meet with you).     Thank you!    Warner Mims MD, MPH  Clinical Sleep and Occupational / Environmental Medicine                Follow-ups after your visit        Who to contact     If you have questions or need follow up information about today's clinic visit or your schedule please contact [facility] directly at [phone].  Normal or non-critical lab and imaging results will be communicated to you by MyChart, letter or phone within 4 business days after the clinic has received the results. If you do not hear from us within 7 days, please contact the clinic through MyChart or phone. If you have a critical or abnormal lab result, we will notify you by phone as soon as possible.  Submit refill requests through [employer] or call your pharmacy and they will forward the refill request to us. Please allow 3 business days for your refill to be completed.          Additional Information About  "Your Visit        Smilehart Information     Pryv lets you send messages to your doctor, view your test results, renew your prescriptions, schedule appointments and more. To sign up, go to [email or web address] . Click on \"Log in\" on the left side of the screen, which will take you to the Welcome page. Then click on \"Sign up Now\" on the right side of the page.     You will be asked to enter the access code listed below, as well as some personal information. Please follow the directions to create your username and password.     Your access code is: [email or web address]  Expires: 2018  4:27 PM     Your access code will  in 90 days. If you need help or a new code, please call your [place] clinic or [phone].        Care EveryWhere ID     This is your Care EveryWhere ID. This could be used by other organizations to access your [place] medical records  [id number]        Your Vitals Were     Pulse Respirations Height Pulse Oximetry BMI (Body Mass Index)       80 16 1.727 m (5' 8\") 95% 43.33 kg/m2        Blood Pressure from Last 3 Encounters:   18 113/68   18 (!) 145/111   18 (!) 124/92    Weight from Last 3 Encounters:   18 129.3 kg (285 lb)   18 129.3 kg (285 lb)   11/10/17 129.3 kg (285 lb)              We Performed the Following     Comprehensive DME          Today's Medication Changes          These changes are accurate as of 18  2:22 PM.  If you have any questions, ask your nurse or doctor.               These medicines have changed or have updated prescriptions.        Dose/Directions    LORazepam 0.5 MG tablet   Commonly known as:  ATIVAN   This may have changed:  when to take this   Used for:  Chest pain        Dose:  0.5 mg   Take 1 tablet (0.5 mg) by mouth every 4 hours as needed for anxiety or other (chest pain)   Quantity:  12 tablet   Refills:  0                Primary Care Provider Office Phone # Fax #    Hilary Finney [phone]       APPLE " [facility] [id number]  [facility] [id number]        Equal Access to Services     LYUDMILATYRA DANELLE : Hadii aad ku hadjoeangel Read, ronaldoda abi, qagudelianarayan madsenmahanny escaleratheresaeloy patton. So [facility] [phone].    ATENCIÓN: Si habla español, tiene a maier disposición servicios gratuitos de asistencia lingüística. Llame al [phone].    We comply with applicable federal civil rights laws and [place] laws. We do not discriminate on the basis of race, color, national origin, age, disability, sex, sexual orientation, or gender identity.            Thank you!     Thank you for choosing [facility]  for your care. Our goal is always to provide you with excellent care. Hearing back from our patients is one way we can continue to improve our services. Please take a few minutes to complete the written survey that you may receive in the mail after your visit with us. Thank you!             Your Updated Medication List - Protect others around you: Learn how to safely use, store and throw away your medicines at www.disposemymeds.org.          This list is accurate as of 5/11/18  2:22 PM.  Always use your most recent med list.                   Brand Name Dispense Instructions for use Diagnosis    ACETAMINOPHEN PO      Take 500 mg by mouth        ADVIL PO      Take 400 mg by mouth every 8 hours as needed for moderate pain        amoxicillin-clavulanate 875-125 MG per tablet    AUGMENTIN    30 tablet    Take 1 tablet by mouth 3 times daily for 10 days        BIOTIN 5000 5 MG Caps   Generic drug:  biotin           glipiZIDE 2.5 MG 24 hr tablet    GLUCOTROL XL     Take 2.5 mg by mouth daily        LORazepam 0.5 MG tablet    ATIVAN    12 tablet    Take 1 tablet (0.5 mg) by mouth every 4 hours as needed for anxiety or other (chest pain)    Chest pain       metroNIDAZOLE 500 MG tablet    FLAGYL    21 tablet    Take 1 tablet (500 mg) by mouth 3 times daily for 7 days         OXYBUTYNIN CHLORIDE PO      Take 5 mg by mouth 2 times daily Clarified as immediate release with [facility]./Patient        pravastatin 20 MG tablet    PRAVACHOL     Take 20 mg by mouth At Bedtime ON HOLD        ranitidine 150 MG capsule    ZANTAC     Take by mouth 2 times daily        sulfamethoxazole-trimethoprim 800-160 MG per tablet    BACTRIM DS    14 tablet    Take 1 tablet by mouth 2 times daily for 7 days        triamcinolone 0.1 % cream    KENALOG     Apply topically 2 times daily        * WARFARIN SODIUM PO      Take 2.5 mg by mouth Take on Sun Mon, Wed, Fri        * WARFARIN SODIUM PO      Take 3.75 mg by mouth Tues, Thus, Sat        * Notice:  This list has 2 medication(s) that are the same as other medications prescribed for you. Read the directions carefully, and ask your doctor or other care provider to review them with you.

## 2018-05-11 NOTE — NURSING NOTE
"Chief Complaint   Patient presents with     RECHECK     Would like new machine, ss in  2011,       Initial /68  Pulse 80  Resp 16  Ht 1.727 m (5' 8\")  Wt 129.3 kg (285 lb)  SpO2 95%  BMI 43.33 kg/m2 Estimated body mass index is 43.33 kg/(m^2) as calculated from the following:    Height as of this encounter: 1.727 m (5' 8\").    Weight as of this encounter: 129.3 kg (285 lb).    Medication Reconciliation: complete       Jeanne Zepeda LPN/BALBINA  "

## 2018-05-25 ENCOUNTER — DOCUMENTATION ONLY (OUTPATIENT)
Dept: SLEEP MEDICINE | Facility: CLINIC | Age: 78
End: 2018-05-25
Payer: MEDICARE

## 2018-05-25 DIAGNOSIS — G47.33 OSA (OBSTRUCTIVE SLEEP APNEA): Primary | ICD-10-CM

## 2018-05-25 NOTE — PROGRESS NOTES
Patient was offered choice of vendor and chose [facility].  Patient Savanna Rehman was set up at [facility] on May 25, 2018. Patient received a Resmed AirSense 10 Auto. Pressures were set at 5-10 cm H2O.   Patient s ramp is 5 cm H2O for Auto and FLEX/EPR is EPR, 2.  Patient received a Melo Respironics Mask name: Dreamwear  Nasal mask Size Small, heated tubing and heated humidifier.  Patient is enrolled in the STM Program and does need to meet compliance. Patient has a follow up on 6/29/2018 with Dr. Mims.    REECE CORREIA

## 2018-06-15 ENCOUNTER — TELEPHONE (OUTPATIENT)
Dept: SLEEP MEDICINE | Facility: CLINIC | Age: 78
End: 2018-06-15

## 2018-06-15 NOTE — TELEPHONE ENCOUNTER
Spoke with Savanna tried to schedule  of oximeter before her follow up appointment with Dr Mims on 6/29/18.  Need to call her back to reschedule the return visit as she was busy and has not used the machine at all.  She stated she had 90 days to get compliant, and still has 60 days left.  I said I would call her back on Tuesday 6/19/18 to reschedule both the return visit and oximeter pickup before that visit.  Explained she needs to use the machine well at least 2 weeks before doing the oximeter.

## 2018-06-28 ENCOUNTER — TELEPHONE (OUTPATIENT)
Dept: SLEEP MEDICINE | Facility: CLINIC | Age: 78
End: 2018-06-28

## 2018-06-28 NOTE — TELEPHONE ENCOUNTER
I called the patient to confirm her appointment for tomorrow and got to listen to her vent for a few minutes.  She stated that she spoke with Angela who was to call her back last Tuesday (I Believe), she still has not received a call back and would like one from Angela.    Also, when she received her replacement cpap machine she was told that someone would be calling her in 3 days and that hasn't happened either.  It was scheduled as a replacement so I am guessing that the chart wasn't routed to [facility].  There are apparently some barriers to her using her machine though she did not get into any details.      I am forwarding to both Angela and MADONNA to reach out to the patient.  I have cancelled her appointment for tomorrow.    Thank you!!    Roya Burk  [facility] Center /  [phone]

## 2018-06-29 ENCOUNTER — TELEPHONE (OUTPATIENT)
Dept: SLEEP MEDICINE | Facility: CLINIC | Age: 78
End: 2018-06-29

## 2018-06-29 NOTE — TELEPHONE ENCOUNTER
Spoke with Savanna, explained I had not called her back as her modem shows she has not started using the machine as yet, so did not know when we should schedule the oximeter, and return visit.  Setup on 5/25/18. Also, explained her insurance requires she complete the 30 consecutive day compliance by 8/25/18,again, and also , have her face to face return visit with her provider.  She went on to say she has had good reason not to start it yet. I asked her when I should schedule her, as I had other patients here.  I have scheduled her return visit on 8/23/18, with  of the oximeter on 8/22/18.  Will forward to Roya RILEY , and to sleep virtual team.

## 2018-08-14 ENCOUNTER — TRANSFERRED RECORDS (OUTPATIENT)
Dept: HEALTH INFORMATION MANAGEMENT | Facility: CLINIC | Age: 78
End: 2018-08-14

## 2018-08-20 ENCOUNTER — TRANSFERRED RECORDS (OUTPATIENT)
Dept: HEALTH INFORMATION MANAGEMENT | Facility: CLINIC | Age: 78
End: 2018-08-20

## 2018-08-21 ENCOUNTER — TELEPHONE (OUTPATIENT)
Dept: SLEEP MEDICINE | Facility: CLINIC | Age: 78
End: 2018-08-21

## 2018-08-21 DIAGNOSIS — R60.0 LOWER EXTREMITY EDEMA: Primary | ICD-10-CM

## 2018-08-27 ENCOUNTER — DOCUMENTATION ONLY (OUTPATIENT)
Dept: SLEEP MEDICINE | Facility: CLINIC | Age: 78
End: 2018-08-27

## 2018-08-27 ENCOUNTER — TELEPHONE (OUTPATIENT)
Dept: SLEEP MEDICINE | Facility: CLINIC | Age: 78
End: 2018-08-27

## 2018-08-27 NOTE — PROGRESS NOTES
Patient called to schedule an in lab study and it has  since 2018. Also, patient has cancelled three in lab studies. Forwarded it on to Dr. Mims and Savanna Oconnor.    Dary Ashley

## 2018-08-27 NOTE — TELEPHONE ENCOUNTER
Patient called to schedule an in lab study today. She has cancelled three times for the in lab study. Also, patient's order has . I don't know how you want to proceed with this patient. Please call her at [phone].    Dary Ashley

## 2018-08-29 ENCOUNTER — OFFICE VISIT (OUTPATIENT)
Dept: SLEEP MEDICINE | Facility: CLINIC | Age: 78
End: 2018-08-29
Payer: MEDICARE

## 2018-08-29 VITALS
BODY MASS INDEX: 43.19 KG/M2 | HEART RATE: 64 BPM | HEIGHT: 68 IN | DIASTOLIC BLOOD PRESSURE: 72 MMHG | SYSTOLIC BLOOD PRESSURE: 125 MMHG | WEIGHT: 285 LBS | OXYGEN SATURATION: 96 %

## 2018-08-29 DIAGNOSIS — G47.33 OSA (OBSTRUCTIVE SLEEP APNEA): Primary | ICD-10-CM

## 2018-08-29 DIAGNOSIS — E66.01 MORBID OBESITY (H): ICD-10-CM

## 2018-08-29 PROCEDURE — 99213 OFFICE O/P EST LOW 20 MIN: CPT | Performed by: INTERNAL MEDICINE

## 2018-08-29 RX ORDER — ZOLPIDEM TARTRATE 5 MG/1
TABLET ORAL
Qty: 0.5 TABLET | Refills: 0 | Status: SHIPPED | OUTPATIENT
Start: 2018-08-29 | End: 2019-10-18

## 2018-08-29 NOTE — PATIENT INSTRUCTIONS
Your blood pressure was checked while you were in clinic today.  Please read the guidelines below about what these numbers mean and what you should do about them.  Your systolic blood pressure is the top number.  This is the pressure when the heart is pumping.  Your diastolic blood pressure is the bottom number.  This is the pressure in between beats.  If your systolic blood pressure is less than 120 and your diastolic blood pressure is less than 80, then your blood pressure is normal. There is nothing more that you need to do about it  If your systolic blood pressure is 120-139 or your diastolic blood pressure is 80-89, your blood pressure may be higher than it should be.  You should have your blood pressure re-checked within a year by a primary care provider.  If your systolic blood pressure is 140 or greater or your diastolic blood pressure is 90 or greater, you may have high blood pressure.  High blood pressure is treatable, but if left untreated over time it can put you at risk for heart attack, stroke, or kidney failure.  You should have your blood pressure re-checked by a primary care provider within the next four weeks.  Your BMI is There is no height or weight on file to calculate BMI.  Weight management is a personal decision.  If you are interested in exploring weight loss strategies, the following discussion covers the approaches that may be successful. Body mass index (BMI) is one way to tell whether you are at a healthy weight, overweight, or obese. It measures your weight in relation to your height.  A BMI of 18.5 to 24.9 is in the healthy range. A person with a BMI of 25 to 29.9 is considered overweight, and someone with a BMI of 30 or greater is considered obese. More than two-thirds of American adults are considered overweight or obese.  Being overweight or obese increases the risk for further weight gain. Excess weight may lead to heart disease and diabetes.  Creating and following plans for  healthy eating and physical activity may help you improve your health.  Weight control is part of healthy lifestyle and includes exercise, emotional health, and healthy eating habits. Careful eating habits lifelong are the mainstay of weight control. Though there are significant health benefits from weight loss, long-term weight loss with diet alone may be very difficult to achieve- studies show long-term success with dietary management in less than 10% of people. Attaining a healthy weight may be especially difficult to achieve in those with severe obesity. In some cases, medications, devices and surgical management might be considered.  What can you do?  If you are overweight or obese and are interested in methods for weight loss, you should discuss this with your provider.     Consider reducing daily calorie intake by 500 calories.     Keep a food journal.     Avoiding skipping meals, consider cutting portions instead.    Diet combined with exercise helps maintain muscle while optimizing fat loss. Strength training is particularly important for building and maintaining muscle mass. Exercise helps reduce stress, increase energy, and improves fitness. Increasing exercise without diet control, however, may not burn enough calories to loose weight.       Start walking three days a week 10-20 minutes at a time    Work towards walking thirty minutes five days a week     Eventually, increase the speed of your walking for 1-2 minutes at time    In addition, we recommend that you review healthy lifestyles and methods for weight loss available through the National Institutes of Health patient information sites:  http://win.niddk.nih.gov/publications/index.htm    And look into health and wellness programs that may be available through your health insurance provider, employer, local community center, or kanchan club.

## 2018-08-29 NOTE — PROGRESS NOTES
[facility]  Outpatient Sleep Medicine Follow-up  August 29, 2018      Name: Savanna Rehman MRN# [id number]   Age: 77 year old YOB: 1940   Date of visit: August 29, 2018  Primary care provider: Hilary Finney         Assessment and Plan:     1. Obstructive Sleep Apnea:  - Failed compliance of PAP use.  Discuss it with options, patient would need an in lab sleep study with TCM CO2, and then will order an CPAP therapy. She is agreeable to proceed for sleep study. She will need mask desensitization and refitting with full face mask.  - Follow up in sleep clinic after the sleep study.      Orders Placed This Encounter   Procedures     Comprehensive Sleep Study    Ambien 2.5 mg x1    Summary Counseling:  New sleep schedule recommendation: given    Check out [email or web address]    All questions were answered.  The patient indicates understanding of the above issues and agrees with the plan set forth.           Chief Complaint:    Routine Follow up            History of Present Illness:     Savanna Rehman is a 77 year old female with history of obstructive sleep apnea, atrial fibrillation, congestive heart failure, coronary artery disease, fibromyalgia, neuropathy, GERD and hiatal hernia and chronic neck and lower back and leg pains who comes to [facility] for follow up. Please see H&P for his presenting sleep symptoms for additional details.  Patient was diagnosed with severe obstructive sleep apnea AHI of 108/hour and had repeat sleep study done on 6/2/2011 which showed AHI of 33.2/hour with oxygen saturation of 80%.  She was prescribed with CPAP therapy but was not using for many years.  She was first seen in [facility] on 10/17 2017, patient was recommended to resume her CPAP therapy.  And out of CPAP 5-10 cm water was ordered which was set up on 5/25/2018.  Patient was unable to use her CPAP therapy due to nasal mask and interface issue, and  mask claustrophobia.  Patient is mouth breather and tried chin strap but could not fit it. She wakes up at night multiple times for urination due to frequent UTI. Her cat is also disturbing her tubing. She returned her machine after she failed compliance.  A repeat sleep study was ordered but was not done so far.  Today patient is here for for follow-up and to discuss how to proceed.  She had known history of severe sleep apnea and she failed CPAP compliance, she will need a repeat in lab sleep study. She has morning headache.    PREVIOUS SLEEP STUDIES: done in [place]   Date: 6/2/2011  AHI: 33.2/hr  Intervention: auto-CPAP 4-6 cmH2O  Lowest O2 saturation: 80%    Date: prior to above study  AHI: 108/hr  Intervention: CPAP  Lowest O2 saturation: 69%      SLEEP-WAKE SCHEDULE: unchanged    Other sleep problems: None     Drowsy driving / near incidents: No    Medications that affect sleep: No    ECHO: 5/2/17  Interpretation Summary     The visual ejection fraction is estimated at 55-60%.  Normal left ventricular wall motion  The right ventricle is normal in size and function.  There is mild (1+) aortic regurgitation.  The rhythm was atrial fibrillation with controlled ventricular rate at rest.  Compared to prior- LV wall motion now normal. The study was technically  difficult.            Medications:     Current Outpatient Prescriptions   Medication Sig     ACETAMINOPHEN PO Take 500 mg by mouth     biotin (BIOTIN 5000) 5 MG CAPS      glipiZIDE (GLUCOTROL XL) 2.5 MG 24 hr tablet Take 2.5 mg by mouth daily     Ibuprofen (ADVIL PO) Take 400 mg by mouth every 8 hours as needed for moderate pain     LORazepam (ATIVAN) 0.5 MG tablet Take 1 tablet (0.5 mg) by mouth every 4 hours as needed for anxiety or other (chest pain) (Patient taking differently: Take 0.5 mg by mouth as needed for anxiety or other (chest pain) )     OXYBUTYNIN CHLORIDE PO Take 5 mg by mouth 2 times daily Clarified as immediate release with  Marcie./Patient     pravastatin (PRAVACHOL) 20 MG tablet Take 20 mg by mouth At Bedtime ON HOLD      ranitidine (ZANTAC) 150 MG capsule Take by mouth 2 times daily     triamcinolone (KENALOG) 0.1 % cream Apply topically 2 times daily     WARFARIN SODIUM PO Take 2.5 mg by mouth Take on Sun Mon, Wed, Fri     WARFARIN SODIUM PO Take 3.75 mg by mouth Tues, Thus, Sat     No current facility-administered medications for this visit.      Facility-Administered Medications Ordered in Other Visits   Medication     nitroglycerin 100 MCG/ML injection        Allergies   Allergen Reactions     Augmented Betamethasone Diprop [Betamethasone] Other (See Comments)     Severe yeast infection     Petroleum Jelly [Petrolatum] Anaphylaxis     Rash and swelling     Shellfish-Derived Products Anaphylaxis     Tongue swelling     Aspirin Swelling     tiongue swelling     Bacitracin      Rash swelling     Coumadin [Warfarin] Swelling     Leg swelling     Darvon [Propoxyphene] Swelling     Throat closes     Dilaudid [Hydromorphone]      Levaquin [Levofloxacin] Swelling     Tongue swelling     Neosporin [Neomycin-Polymyx-Gramicid] Swelling     rash     Oxycodone      Severe itching     Percodan [Oxycodone-Aspirin]      Severe itching     Tramadol      Vicodin [Hydrocodone-Acetaminophen]      Severe itching       Xarelto [Rivaroxaban]      Adhesive Tape Rash     Band aids      Codeine Rash            Past Medical History:     Past Medical History:   Diagnosis Date     Anemia     Iron Deficiency anemia     Atrial fibrillation (H)      CAD (coronary artery disease)     non-obstructive     Chronic pain     neck, low back, legs     Congestive heart failure (H)      Degenerative disk disease      Fibromyalgia      Gastro-oesophageal reflux disease      Hiatal hernia      Neuropathy      Pernicious anemia      Sleep apnea     uses CPAP.     Urinary incontinence      Vitamin D deficiency              Past Surgical History:      Past Surgical  "History:   Procedure Laterality Date     APPENDECTOMY       CHOLECYSTECTOMY       COLONOSCOPY  3/15/2011     CORONARY ANGIOGRAPHY ADULT ORDER       HEART CATH LEFT HEART CATH  12/30/16    medication management     HYSTERECTOMY TOTAL ABDOMINAL       Knee replacement NOS Left      LAPAROSCOPIC NISSEN FUNDOPLICATION N/A 2/4/2015    Procedure: LAPAROSCOPIC NISSEN FUNDOPLICATION;  Surgeon: Armando Ansari MD;  Location: [facility] OR     TONSILLECTOMY       TRANSPOSITION ULNAR NERVE (ELBOW)         Social History   Substance Use Topics     Smoking status: Former Smoker     Types: Cigarettes     Quit date: 9/1/2014     Smokeless tobacco: Not on file     Alcohol use Yes      Comment: socially.       Family History   Problem Relation Age of Onset     Unknown/Adopted No family hx of             Physical Examination:   /72  Pulse 64  Ht 1.727 m (5' 7.99\")  Wt 129.3 kg (285 lb)  SpO2 96%  BMI 43.34 kg/m2            Data: All pertinent previous laboratory data reviewed     No results found for: PH, PHARTERIAL, PO2, [id number], SAT, PCO2, HCO3, BASEEXCESS, DIVINA, BEB  No results found for: TSH  Lab Results   Component Value Date     (H) 05/04/2018     (H) 01/14/2018     Lab Results   Component Value Date    HGB 15.3 05/04/2018    HGB 15.4 01/14/2018     Lab Results   Component Value Date    BUN 16 05/04/2018    BUN 15 01/14/2018    CR 1.01 05/04/2018    CR 0.87 01/14/2018     No results found for: ARMAND      She will follow up with me after the sleep study        Copy to: Hilary Finney MD 8/29/2018     [facility]  [id number] [place] [id number]       Fifteen minutes spent with patient, all of which were spent face-to-face counseling, consulting, coordinating plan of care and going over PAP download/sleep study and chart review.          "

## 2018-08-29 NOTE — MR AVS SNAPSHOT
After Visit Summary   8/29/2018    Savanna Rehman    MRN: [id number]           Patient Information     Date Of Birth          1940        Visit Information        Provider Department      8/29/2018 10:30 AM Duc Olivo MD [facility]        Today's Diagnoses     CRISTIANA (obstructive sleep apnea)    -  1    Morbid obesity (H)          Care Instructions    Your blood pressure was checked while you were in clinic today.  Please read the guidelines below about what these numbers mean and what you should do about them.  Your systolic blood pressure is the top number.  This is the pressure when the heart is pumping.  Your diastolic blood pressure is the bottom number.  This is the pressure in between beats.  If your systolic blood pressure is less than 120 and your diastolic blood pressure is less than 80, then your blood pressure is normal. There is nothing more that you need to do about it  If your systolic blood pressure is 120-139 or your diastolic blood pressure is 80-89, your blood pressure may be higher than it should be.  You should have your blood pressure re-checked within a year by a primary care provider.  If your systolic blood pressure is 140 or greater or your diastolic blood pressure is 90 or greater, you may have high blood pressure.  High blood pressure is treatable, but if left untreated over time it can put you at risk for heart attack, stroke, or kidney failure.  You should have your blood pressure re-checked by a primary care provider within the next four weeks.  Your BMI is There is no height or weight on file to calculate BMI.  Weight management is a personal decision.  If you are interested in exploring weight loss strategies, the following discussion covers the approaches that may be successful. Body mass index (BMI) is one way to tell whether you are at a healthy weight, overweight, or obese. It measures your weight in relation to your height.  A  BMI of 18.5 to 24.9 is in the healthy range. A person with a BMI of 25 to 29.9 is considered overweight, and someone with a BMI of 30 or greater is considered obese. More than two-thirds of American adults are considered overweight or obese.  Being overweight or obese increases the risk for further weight gain. Excess weight may lead to heart disease and diabetes.  Creating and following plans for healthy eating and physical activity may help you improve your health.  Weight control is part of healthy lifestyle and includes exercise, emotional health, and healthy eating habits. Careful eating habits lifelong are the mainstay of weight control. Though there are significant health benefits from weight loss, long-term weight loss with diet alone may be very difficult to achieve- studies show long-term success with dietary management in less than 10% of people. Attaining a healthy weight may be especially difficult to achieve in those with severe obesity. In some cases, medications, devices and surgical management might be considered.  What can you do?  If you are overweight or obese and are interested in methods for weight loss, you should discuss this with your provider.     Consider reducing daily calorie intake by 500 calories.     Keep a food journal.     Avoiding skipping meals, consider cutting portions instead.    Diet combined with exercise helps maintain muscle while optimizing fat loss. Strength training is particularly important for building and maintaining muscle mass. Exercise helps reduce stress, increase energy, and improves fitness. Increasing exercise without diet control, however, may not burn enough calories to loose weight.       Start walking three days a week 10-20 minutes at a time    Work towards walking thirty minutes five days a week     Eventually, increase the speed of your walking for 1-2 minutes at time    In addition, we recommend that you review healthy lifestyles and methods for weight  loss available through the National Institutes of Health patient information sites:  http://win.niddk.nih.gov/publications/index.htm    And look into health and wellness programs that may be available through your health insurance provider, employer, local community center, or kanchan club.                Follow-ups after your visit        Your next 10 appointments already scheduled     Aug 30, 2018 10:45 AM CDT   Ech Complete with [id number] ([facility])    [address]  [facility] [id number] [phone]           1.  Please bring or wear a comfortable two-piece outfit. 2.  You may eat, drink and take your normal medicines. 3.  For any questions that cannot be answered, please contact the ordering physician 4.  Please do not wear perfumes or scented lotions on the day of your exam. ***Please check-in at the [place] Registration Office located in [address] in the [facility] building. When you are finished registering, please go to [address] and have a seat. The technician will call your name for the test.            Sep 18, 2018  1:10 PM CDT   Return Visit with BATSHEVA Biggs CNP   [facility] ([facility])    [address]  [facility] [address]   [phone]            Nov 01, 2018 10:15 AM CDT   New Visit with Aniyah Waller DO   [facility] ([facility])    [address]  [facility] [address]   [phone]              Future tests that were ordered for you today     Open Future Orders        Priority Expected Expires Ordered    Comprehensive Sleep Study Routine  2/25/2019 8/29/2018            Who to contact     If you have questions or need follow up information about today's clinic visit or your schedule please contact [facility] directly at  "[phone].  Normal or non-critical lab and imaging results will be communicated to you by MyChart, letter or phone within 4 business days after the clinic has received the results. If you do not hear from us within 7 days, please contact the clinic through MyChart or phone. If you have a critical or abnormal lab result, we will notify you by phone as soon as possible.  Submit refill requests through trippiecet or call your pharmacy and they will forward the refill request to us. Please allow 3 business days for your refill to be completed.          Additional Information About Your Visit        Care EveryWhere ID     This is your Care EveryWhere ID. This could be used by other organizations to access your [place] medical records  [id number]        Your Vitals Were     Pulse Height Pulse Oximetry BMI (Body Mass Index)          64 1.727 m (5' 7.99\") 96% 43.34 kg/m2         Blood Pressure from Last 3 Encounters:   08/29/18 125/72   05/11/18 113/68   05/07/18 (!) 145/111    Weight from Last 3 Encounters:   08/29/18 129.3 kg (285 lb)   05/11/18 129.3 kg (285 lb)   05/04/18 129.3 kg (285 lb)                 Today's Medication Changes          These changes are accurate as of 8/29/18 11:07 AM.  If you have any questions, ask your nurse or doctor.               Start taking these medicines.        Dose/Directions    zolpidem 5 MG tablet   Commonly known as:  AMBIEN   Used for:  CRISTIANA (obstructive sleep apnea)   Started by:  Duc Olivo MD        Take tablet by mouth 15 minutes prior to sleep, for Sleep Study   Quantity:  0.5 tablet   Refills:  0         These medicines have changed or have updated prescriptions.        Dose/Directions    LORazepam 0.5 MG tablet   Commonly known as:  ATIVAN   This may have changed:  when to take this   Used for:  Chest pain        Dose:  0.5 mg   Take 1 tablet (0.5 mg) by mouth every 4 hours as needed for anxiety or other (chest pain)   Quantity:  12 tablet   Refills:  0          "   Where to get your medicines      Some of these will need a paper prescription and others can be bought over the counter.  Ask your nurse if you have questions.     Bring a paper prescription for each of these medications     zolpidem 5 MG tablet                Primary Care Provider Office Phone # Fax #    Hilary Finney [phone]       [facility] [address]  [facility] [address]        Equal Access to Services     KAYLEIGH MCCLENDON : Hadii aad ku hadasho Soomaali, waaxda luqadaha, qaybta kaalmada adeegyada, waxay idiin hayaan adeeg khfernanda laOttonielavril ah. So [facility] [phone].    ATENCIÓN: Si gay roldan, tiene a maier disposición servicios gratuitos de asistencia lingüística. [facility] [phone].    We comply with applicable federal civil rights laws and [place] laws. We do not discriminate on the basis of race, color, national origin, age, disability, sex, sexual orientation, or gender identity.            Thank you!     Thank you for choosing [facility]  for your care. Our goal is always to provide you with excellent care. Hearing back from our patients is one way we can continue to improve our services. Please take a few minutes to complete the written survey that you may receive in the mail after your visit with us. Thank you!             Your Updated Medication List - Protect others around you: Learn how to safely use, store and throw away your medicines at www.disposemymeds.org.          This list is accurate as of 8/29/18 11:07 AM.  Always use your most recent med list.                   Brand Name Dispense Instructions for use Diagnosis    ACETAMINOPHEN PO      Take 500 mg by mouth        ADVIL PO      Take 400 mg by mouth every 8 hours as needed for moderate pain        BIOTIN 5000 5 MG Caps   Generic drug:  biotin           glipiZIDE 2.5 MG 24 hr tablet    GLUCOTROL XL     Take 2.5 mg by mouth daily        LORazepam 0.5 MG tablet    ATIVAN    12  tablet    Take 1 tablet (0.5 mg) by mouth every 4 hours as needed for anxiety or other (chest pain)    Chest pain       OXYBUTYNIN CHLORIDE PO      Take 5 mg by mouth 2 times daily Clarified as immediate release with [facility]./Patient        pravastatin 20 MG tablet    PRAVACHOL     Take 20 mg by mouth At Bedtime ON HOLD        ranitidine 150 MG capsule    ZANTAC     Take by mouth 2 times daily        triamcinolone 0.1 % cream    KENALOG     Apply topically 2 times daily        * WARFARIN SODIUM PO      Take 2.5 mg by mouth Take on Sun Mon, Wed, Fri        * WARFARIN SODIUM PO      Take 5 mg by mouth daily Tues, Thus, Sat        zolpidem 5 MG tablet    AMBIEN    0.5 tablet    Take tablet by mouth 15 minutes prior to sleep, for Sleep Study    CRISTIANA (obstructive sleep apnea)       * Notice:  This list has 2 medication(s) that are the same as other medications prescribed for you. Read the directions carefully, and ask your doctor or other care provider to review them with you.

## 2018-09-07 ENCOUNTER — HOSPITAL ENCOUNTER (OUTPATIENT)
Dept: CARDIOLOGY | Facility: CLINIC | Age: 78
Discharge: HOME | End: 2018-09-07
Admitting: NURSE PRACTITIONER
Payer: MEDICARE

## 2018-09-07 DIAGNOSIS — R60.0 LOWER EXTREMITY EDEMA: ICD-10-CM

## 2018-09-07 PROCEDURE — 40000264 ECHO COMPLETE WITH OPTISON

## 2018-09-07 PROCEDURE — 25500064 ZZH RX 255 OP 636

## 2018-09-07 PROCEDURE — 93306 TTE W/DOPPLER COMPLETE: CPT | Mod: 26 | Performed by: INTERNAL MEDICINE

## 2018-09-07 RX ADMIN — HUMAN ALBUMIN MICROSPHERES AND PERFLUTREN 3 ML: 10; .22 INJECTION, SOLUTION INTRAVENOUS at 13:21

## 2018-09-10 ENCOUNTER — TELEPHONE (OUTPATIENT)
Dept: CARDIOLOGY | Facility: CLINIC | Age: 78
End: 2018-09-10

## 2018-09-10 NOTE — TELEPHONE ENCOUNTER
Patient recently had an echo, that was ordered by PMD. Patient wanted to make sure Rosanna Gramajo NP had her results. Rosanna Gramajo NP is seeing patient on 09-18-18.     Called patient. Patient was informed that we do have her results. Patient had no questions.     Sherie HORTON

## 2018-09-18 ENCOUNTER — OFFICE VISIT (OUTPATIENT)
Dept: CARDIOLOGY | Facility: CLINIC | Age: 78
End: 2018-09-18
Attending: INTERNAL MEDICINE
Payer: MEDICARE

## 2018-09-18 VITALS
SYSTOLIC BLOOD PRESSURE: 129 MMHG | DIASTOLIC BLOOD PRESSURE: 88 MMHG | WEIGHT: 284.5 LBS | HEIGHT: 68 IN | BODY MASS INDEX: 43.12 KG/M2 | HEART RATE: 88 BPM

## 2018-09-18 DIAGNOSIS — I48.91 ATRIAL FIBRILLATION, UNSPECIFIED TYPE (H): ICD-10-CM

## 2018-09-18 PROCEDURE — 99214 OFFICE O/P EST MOD 30 MIN: CPT | Performed by: NURSE PRACTITIONER

## 2018-09-18 NOTE — PROGRESS NOTES
Service Date: 09/18/2018      HISTORY OF PRESENT ILLNESS:  This 77-year-old female presents to the [facility] today for a followup visit.  She is a patient of Dr. Geller seen in our clinic for atrial fibrillation, chronic diastolic heart failure, hyperlipidemia, sleep apnea, diabetes, morbid obesity and hypertension.      Savanna had a hospitalization in 2016 when she had elevated troponin level and evidence of atrial fibrillation with a rapid ventricular rate.  At that time there was also evidence of regional wall motion abnormality on her echocardiogram.  She underwent coronary angiography that revealed no significant obstructive coronary artery disease.  Followup echocardiogram showed a left ventricular ejection fraction of 55% without any regional wall motion abnormalities.  She has remained in atrial fibrillation in followup and is on chronic warfarin.  She has not required any negative chronotropic agents to control her heart rate.  Holter monitoring was suggested to assess 24-hour heart rate, however, the patient deferred last year.  She occasionally has been taking Lasix for intermittent lower extremity edema.  She has significant sleep apnea but has been noncompliant with her CPAP.  She returns today for reassessment and surveillance echocardiogram review.        Savanna denies any chest pain with activity or at rest.  She is not short of breath.  However, she does admit to being deconditioned and gets short of breath with moderate amount of activity.  This is relatively unchanged.  She denies any sensations of her irregular heart rhythm, lightheadedness, dizziness or orthopnea.  In the past, she was told she has severe sleep apnea and tried CPAP machine for a while but was somewhat intolerant.  She is scheduled to undergo a repeat sleep study in a couple of weeks.      Savanna has not been taking her Lasix recently due to urinary incontinence.  She has appreciated  a little more swelling in her right leg since she stopped Lasix.  It appears her primary medical doctor recently had her undergo ultrasound which showed no evidence of deep vein thrombosis.      I reviewed her echocardiogram done last week.  This showed a left ventricular ejection fraction of 50%-55%, biatrial enlargement, 1+ aortic regurgitation, mild ascending aortic dilatation, normal right ventricular function and no regional wall motion abnormalities.  She was in atrial fibrillation with a controlled ventricular response at that time.      PHYSICAL EXAMINATION:  Her blood pressure today is 129/88 with a heart rate of 88 beats per minute and is irregular.  Her lungs are clear.  She has 1 to 2+ pitting shin and ankle edema on the right with no significant left ankle edema.  Her weight is stable at 284 pounds, placing her BMI at 43.      IMPRESSION AND PLAN:   1.  Permanent atrial fibrillation.  Heart rate appears to be fairly well controlled without any negative chronotropic agents.  We again talked about Holter assessment for a 24-hour heart rate and she has deferred.  She remains on chronic warfarin without any bleeding issues, being managed through her primary medical doctor.  She is completely asymptomatic.   2.  Hypertension.  Borderline diastolic elevated blood pressure today.  However, she has a nurse come into her house weekly and tells me that her blood pressure has been well controlled there.   3.  Treated hyperlipidemia.  This is being managed through her primary medical doctor at [facility].   4.  Morbid obesity.   5.  Chronic diastolic dysfunction.  She intermittently has unilateral leg swelling.  This does improve with intermittent Lasix use.  However, she is somewhat noncompliant due to urinary incontinence.  She tells me she is unable to use compression stockings.  We talked about avoiding salty foods and leg elevation in the afternoon.   6.  Untreated sleep apnea.  I strongly  advised to continue with her sleep study and compliance with his CPAP if needed.      Savanna has already scheduled appointment with Dr. Waller for followup with a cardiologist in the [place] location.  We will have her keep this appointment.      Thanks for allowing me to participate in this patient's care.         BATSHEVA ALEJANDRE, CNP             D: 2018   T: 2018   MT: KAELYN      Name:     SAVANNA SANDERSON   MRN:      [id number]        Account:      [id number]   :      1940           Service Date: 2018      Document: [id number]

## 2018-09-18 NOTE — LETTER
9/18/2018    Hilary Finney MD, MD  [facility] [address]  [facility] [address]    RE: Savanna Sosakarie       Dear Colleague,    I had the pleasure of seeing Savanna Sosakarie in the [facility].    HPI and Plan:  #[id number]  See dictation    No orders of the defined types were placed in this encounter.      No orders of the defined types were placed in this encounter.      There are no discontinued medications.      Encounter Diagnosis   Name Primary?     Atrial fibrillation, unspecified type (H)        CURRENT MEDICATIONS:  Current Outpatient Prescriptions   Medication Sig Dispense Refill     ACETAMINOPHEN PO Take 500 mg by mouth       biotin (BIOTIN 5000) 5 MG CAPS        glipiZIDE (GLUCOTROL XL) 2.5 MG 24 hr tablet Take 2.5 mg by mouth daily  1     Ibuprofen (ADVIL PO) Take 400 mg by mouth every 8 hours as needed for moderate pain       LORazepam (ATIVAN) 0.5 MG tablet Take 1 tablet (0.5 mg) by mouth every 4 hours as needed for anxiety or other (chest pain) (Patient taking differently: Take 0.5 mg by mouth as needed for anxiety or other (chest pain) ) 12 tablet 0     OXYBUTYNIN CHLORIDE PO Take 5 mg by mouth 2 times daily Clarified as immediate release with [facility]./Patient       pravastatin (PRAVACHOL) 20 MG tablet Take 20 mg by mouth At Bedtime   1     ranitidine (ZANTAC) 150 MG capsule Take by mouth 2 times daily       WARFARIN SODIUM PO Take 2.5 mg by mouth Take on Sun Mon, Wed, Fri       WARFARIN SODIUM PO Take 5 mg by mouth daily Tues, Thus, Sat       zolpidem (AMBIEN) 5 MG tablet Take tablet by mouth 15 minutes prior to sleep, for Sleep Study 0.5 tablet 0     triamcinolone (KENALOG) 0.1 % cream Apply topically 2 times daily         ALLERGIES     Allergies   Allergen Reactions     Augmented Betamethasone Diprop [Betamethasone] Other (See Comments)     Severe yeast infection     Petroleum Jelly [Petrolatum] Anaphylaxis     Rash and swelling      Shellfish-Derived Products Anaphylaxis     Tongue swelling     Aspirin Swelling     tiongue swelling     Bacitracin      Rash swelling     Coumadin [Warfarin] Swelling     Leg swelling     Darvon [Propoxyphene] Swelling     Throat closes     Dilaudid [Hydromorphone]      Levaquin [Levofloxacin] Swelling     Tongue swelling     Neosporin [Neomycin-Polymyx-Gramicid] Swelling     rash     Oxycodone      Severe itching     Percodan [Oxycodone-Aspirin]      Severe itching     Tramadol      Vicodin [Hydrocodone-Acetaminophen]      Severe itching       Xarelto [Rivaroxaban]      Adhesive Tape Rash     Band aids      Codeine Rash       PAST MEDICAL HISTORY:  Past Medical History:   Diagnosis Date     Anemia     Iron Deficiency anemia     Atrial fibrillation (H)      CAD (coronary artery disease)     non-obstructive     Chronic pain     neck, low back, legs     Congestive heart failure (H)      Degenerative disk disease      Fibromyalgia      Gastro-oesophageal reflux disease      Hiatal hernia      Neuropathy      Pernicious anemia      Sleep apnea     uses CPAP.     Urinary incontinence      Vitamin D deficiency        PAST SURGICAL HISTORY:  Past Surgical History:   Procedure Laterality Date     APPENDECTOMY       CHOLECYSTECTOMY       COLONOSCOPY  3/15/2011     CORONARY ANGIOGRAPHY ADULT ORDER       HEART CATH LEFT HEART CATH  12/30/16    medication management     HYSTERECTOMY TOTAL ABDOMINAL       Knee replacement NOS Left      LAPAROSCOPIC NISSEN FUNDOPLICATION N/A 2/4/2015    Procedure: LAPAROSCOPIC NISSEN FUNDOPLICATION;  Surgeon: Armando Ansari MD;  Location: [facility]     TONSILLECTOMY       TRANSPOSITION ULNAR NERVE (ELBOW)         FAMILY HISTORY:  Family History   Problem Relation Age of Onset     Alzheimer Disease Mother      Lung Cancer Father      Unknown/Adopted No family hx of        SOCIAL HISTORY:  Social History     Social History     Marital status:      Spouse name: N/A     Number of children: N/A  "    Years of education: N/A     Social History Main Topics     Smoking status: Former Smoker     Types: Cigarettes     Quit date: 9/1/2014     Smokeless tobacco: Never Used     Alcohol use Yes      Comment: socially     Drug use: None     Sexual activity: Not Asked     Other Topics Concern     None     Social History Narrative       Review of Systems:  Skin:  Positive for   \"spots\" on her neck, arms and legs   Eyes:  Positive for glasses    ENT:  Negative      Respiratory:  Positive for dyspnea on exertion;sleep apnea dyspnea is \"not bad'; does not wear CPAP   Cardiovascular:    Positive for;edema;fatigue    Gastroenterology: Positive for vomiting vomiting with certain foods  Genitourinary:  not assessed      Musculoskeletal:  Positive for back pain;arthritis;joint pain;nocturnal cramping;foot pain    Neurologic:  Positive for numbness or tingling of feet neuropathy  Psychiatric:  not assessed      Heme/Lymph/Imm:  Positive for easy bruising RX allergies,  Food allergies   Endocrine:  Positive for diabetes      Physical Exam:  Vitals: /88 (BP Location: Other (Comment), Patient Position: Chair, Cuff Size: Adult Regular)  Pulse 88  Ht 1.727 m (5' 8\")  Wt 129 kg (284 lb 8 oz)  BMI 43.26 kg/m2    Constitutional:  cooperative;in no acute distress morbidly obese      Skin:  warm and dry to the touch          Head:  normocephalic        Eyes:  pupils equal and round;sclera white        Lymph:      ENT:  no pallor or cyanosis        Neck:  no carotid bruit   JVP not well visualized    Respiratory:  clear to auscultation;normal respiratory excursion         Cardiac:   irregularly irregular rhythm distant heart sounds no presence of murmur          not assessed this visit                                        GI:  abdomen soft;non-tender obese      Extremities and Muscular Skeletal:      bilateral LE edema;pitting;R greater than L;trace;1+          Neurological:  affect appropriate;no gross motor deficits    "     Psych:  Alert and Oriented x 3          CC  Dottie Geller MD  [address]  [place] [address]                    Thank you for allowing me to participate in the care of your patient.      Sincerely,     BATSHEVA Perez CNP     [facility]    cc:   Dottie Geller MD  [address]  [place] [address]

## 2018-09-18 NOTE — MR AVS SNAPSHOT
After Visit Summary   9/18/2018    Savanna Rehman    MRN: [id number]           Patient Information     Date Of Birth          1940        Visit Information        Provider Department      9/18/2018 1:10 PM Rosanna Gramajo APRN CNP [facility]        Today's Diagnoses     Atrial fibrillation, unspecified type (H)           Follow-ups after your visit        Your next 10 appointments already scheduled     Nov 01, 2018 10:15 AM CDT   New Visit with Aniyah Waller DO   [facility] ([facility])    [address]  [facility] [address]   [phone]            Nov 01, 2018  8:30 PM CDT   Psg Split W/Tcm with BED 5 SH SLEEP   [facility] ([facility])    [address] [place] [address] [address]  [facility] [address] [phone]            Nov 07, 2018 11:00 AM CST   Return Sleep Patient with Duc Olivo MD   [facility]    [address]  [facility] [address]   [phone]              Who to contact     If you have questions or need follow up information about today's clinic visit or your schedule please contact [facility] directly at [phone].  Normal or non-critical lab and imaging results will be communicated to you by MyChart, letter or phone within 4 business days after the clinic has received the results. If you do not hear from us within 7 days, please contact the clinic through MyChart or phone. If you have a critical or abnormal lab result, we will notify you by phone as soon as possible.  Submit refill requests through [employer] or call your pharmacy and they will forward the refill request to us. Please allow 3 business days for your refill to be completed.          Additional Information About Your Visit    "     Care EveryWhere ID     This is your Care EveryWhere ID. This could be used by other organizations to access your [facility] medical records  [id number]        Your Vitals Were     Pulse Height BMI (Body Mass Index)             88 1.727 m (5' 8\") 43.26 kg/m2          Blood Pressure from Last 3 Encounters:   09/18/18 129/88   08/29/18 125/72   05/11/18 113/68    Weight from Last 3 Encounters:   09/18/18 129 kg (284 lb 8 oz)   08/29/18 129.3 kg (285 lb)   05/11/18 129.3 kg (285 lb)              We Performed the Following     Follow-Up with Cardiac Advanced Practice Provider          Today's Medication Changes          These changes are accurate as of 9/18/18  1:59 PM.  If you have any questions, ask your nurse or doctor.               These medicines have changed or have updated prescriptions.        Dose/Directions    LORazepam 0.5 MG tablet   Commonly known as:  ATIVAN   This may have changed:  when to take this   Used for:  Chest pain        Dose:  0.5 mg   Take 1 tablet (0.5 mg) by mouth every 4 hours as needed for anxiety or other (chest pain)   Quantity:  12 tablet   Refills:  0                Primary Care Provider Office Phone # Fax #    Hilary Lucy Finney [phone]       [facility] [address] [facility] [address]        Equal Access to Services     KAYLEIGH MCCLENDON AH: Hadii naif madden hadasho Soomaali, waaxda luqadaha, qaybta kaalmada adedonny, hanny patton. So [facility] [phone].    ATENCIÓN: Si habla español, tiene a maier disposición servicios gratuitos de asistencia lingüística. Erika al [phone].    We comply with applicable federal civil rights laws and [place] laws. We do not discriminate on the basis of race, color, national origin, age, disability, sex, sexual orientation, or gender identity.            Thank you!     Thank you for choosing [facility]  for your care. Our goal is always to " provide you with excellent care. Hearing back from our patients is one way we can continue to improve our services. Please take a few minutes to complete the written survey that you may receive in the mail after your visit with us. Thank you!             Your Updated Medication List - Protect others around you: Learn how to safely use, store and throw away your medicines at www.disposemymeds.org.          This list is accurate as of 9/18/18  1:59 PM.  Always use your most recent med list.                   Brand Name Dispense Instructions for use Diagnosis    ACETAMINOPHEN PO      Take 500 mg by mouth        ADVIL PO      Take 400 mg by mouth every 8 hours as needed for moderate pain        BIOTIN 5000 5 MG Caps   Generic drug:  biotin           glipiZIDE 2.5 MG 24 hr tablet    GLUCOTROL XL     Take 2.5 mg by mouth daily        LORazepam 0.5 MG tablet    ATIVAN    12 tablet    Take 1 tablet (0.5 mg) by mouth every 4 hours as needed for anxiety or other (chest pain)    Chest pain       OXYBUTYNIN CHLORIDE PO      Take 5 mg by mouth 2 times daily Clarified as immediate release with [facility]./Patient        pravastatin 20 MG tablet    PRAVACHOL     Take 20 mg by mouth At Bedtime        ranitidine 150 MG capsule    ZANTAC     Take by mouth 2 times daily        triamcinolone 0.1 % cream    KENALOG     Apply topically 2 times daily        * WARFARIN SODIUM PO      Take 2.5 mg by mouth Take on Sun Mon, Wed, Fri        * WARFARIN SODIUM PO      Take 5 mg by mouth daily Tues, Thus, Sat        zolpidem 5 MG tablet    AMBIEN    0.5 tablet    Take tablet by mouth 15 minutes prior to sleep, for Sleep Study    CRISTIANA (obstructive sleep apnea)       * Notice:  This list has 2 medication(s) that are the same as other medications prescribed for you. Read the directions carefully, and ask your doctor or other care provider to review them with you.

## 2018-09-18 NOTE — LETTER
9/18/2018      Hilary Finney MD, MD  [facility] [address]  [facility] [address]      RE: Savanna Rehman       Dear Colleague,    I had the pleasure of seeing Savanna Rehman in the [facility].    Service Date: 09/18/2018      HISTORY OF PRESENT ILLNESS:  This 77-year-old female presents to the [facility] today for a followup visit.  She is a patient of Dr. Geller seen in our clinic for atrial fibrillation, chronic diastolic heart failure, hyperlipidemia, sleep apnea, diabetes, morbid obesity and hypertension.      Savanna had a hospitalization in 2016 when she had elevated troponin level and evidence of atrial fibrillation with a rapid ventricular rate.  At that time there was also evidence of regional wall motion abnormality on her echocardiogram.  She underwent coronary angiography that revealed no significant obstructive coronary artery disease.  Followup echocardiogram showed a left ventricular ejection fraction of 55% without any regional wall motion abnormalities.  She has remained in atrial fibrillation in followup and is on chronic warfarin.  She has not required any negative chronotropic agents to control her heart rate.  Holter monitoring was suggested to assess 24-hour heart rate, however, the patient deferred last year.  She occasionally has been taking Lasix for intermittent lower extremity edema.  She has significant sleep apnea but has been noncompliant with her CPAP.  She returns today for reassessment and surveillance echocardiogram review.        Savanna denies any chest pain with activity or at rest.  She is not short of breath.  However, she does admit to being deconditioned and gets short of breath with moderate amount of activity.  This is relatively unchanged.  She denies any sensations of her irregular heart rhythm, lightheadedness, dizziness or orthopnea.  In the past, she was told she has  severe sleep apnea and tried CPAP machine for a while but was somewhat intolerant.  She is scheduled to undergo a repeat sleep study in a couple of weeks.      Savanna has not been taking her Lasix recently due to urinary incontinence.  She has appreciated a little more swelling in her right leg since she stopped Lasix.  It appears her primary medical doctor recently had her undergo ultrasound which showed no evidence of deep vein thrombosis.      I reviewed her echocardiogram done last week.  This showed a left ventricular ejection fraction of 50%-55%, biatrial enlargement, 1+ aortic regurgitation, mild ascending aortic dilatation, normal right ventricular function and no regional wall motion abnormalities.  She was in atrial fibrillation with a controlled ventricular response at that time.      PHYSICAL EXAMINATION:  Her blood pressure today is 129/88 with a heart rate of 88 beats per minute and is irregular.  Her lungs are clear.  She has 1 to 2+ pitting shin and ankle edema on the right with no significant left ankle edema.  Her weight is stable at 284 pounds, placing her BMI at 43.      IMPRESSION AND PLAN:   1.  Permanent atrial fibrillation.  Heart rate appears to be fairly well controlled without any negative chronotropic agents.  We again talked about Holter assessment for a 24-hour heart rate and she has deferred.  She remains on chronic warfarin without any bleeding issues, being managed through her primary medical doctor.  She is completely asymptomatic.   2.  Hypertension.  Borderline diastolic elevated blood pressure today.  However, she has a nurse come into her house weekly and tells me that her blood pressure has been well controlled there.   3.  Treated hyperlipidemia.  This is being managed through her primary medical doctor at [facility].   4.  Morbid obesity.   5.  Chronic diastolic dysfunction.  She intermittently has unilateral leg swelling.  This does improve with intermittent  Lasix use.  However, she is somewhat noncompliant due to urinary incontinence.  She tells me she is unable to use compression stockings.  We talked about avoiding salty foods and leg elevation in the afternoon.   6.  Untreated sleep apnea.  I strongly advised to continue with her sleep study and compliance with his CPAP if needed.      Savanna has already scheduled appointment with Dr. Waller for followup with a cardiologist in the [place] location.  We will have her keep this appointment.      Thanks for allowing me to participate in this patient's care.         BATSHEVA ALEJANDRE, CNP             D: 2018   T: 2018   MT: KAELYN      Name:     SAVANNA SANDERSON   MRN:      [id number]        Account:      [id number]   :      1940           Service Date: 2018      Document: [id number]         Outpatient Encounter Prescriptions as of 2018   Medication Sig Dispense Refill     ACETAMINOPHEN PO Take 500 mg by mouth       biotin (BIOTIN 5000) 5 MG CAPS        glipiZIDE (GLUCOTROL XL) 2.5 MG 24 hr tablet Take 2.5 mg by mouth daily  1     Ibuprofen (ADVIL PO) Take 400 mg by mouth every 8 hours as needed for moderate pain       LORazepam (ATIVAN) 0.5 MG tablet Take 1 tablet (0.5 mg) by mouth every 4 hours as needed for anxiety or other (chest pain) (Patient taking differently: Take 0.5 mg by mouth as needed for anxiety or other (chest pain) ) 12 tablet 0     OXYBUTYNIN CHLORIDE PO Take 5 mg by mouth 2 times daily Clarified as immediate release with [facility]./Patient       pravastatin (PRAVACHOL) 20 MG tablet Take 20 mg by mouth At Bedtime   1     ranitidine (ZANTAC) 150 MG capsule Take by mouth 2 times daily       WARFARIN SODIUM PO Take 2.5 mg by mouth Take on Sun Mon, Wed, Fri       WARFARIN SODIUM PO Take 5 mg by mouth daily Tues, Thus, Sat       zolpidem (AMBIEN) 5 MG tablet Take tablet by mouth 15 minutes prior to sleep, for Sleep Study 0.5 tablet 0     triamcinolone (KENALOG) 0.1 % cream  Apply topically 2 times daily       Facility-Administered Encounter Medications as of 9/18/2018   Medication Dose Route Frequency Provider Last Rate Last Dose     nitroglycerin 100 MCG/ML injection                Again, thank you for allowing me to participate in the care of your patient.      Sincerely,    BATSHEVA Perez [facility]

## 2018-09-18 NOTE — PROGRESS NOTES
HPI and Plan:  #[id number]  See dictation    No orders of the defined types were placed in this encounter.      No orders of the defined types were placed in this encounter.      There are no discontinued medications.      Encounter Diagnosis   Name Primary?     Atrial fibrillation, unspecified type (H)        CURRENT MEDICATIONS:  Current Outpatient Prescriptions   Medication Sig Dispense Refill     ACETAMINOPHEN PO Take 500 mg by mouth       biotin (BIOTIN 5000) 5 MG CAPS        glipiZIDE (GLUCOTROL XL) 2.5 MG 24 hr tablet Take 2.5 mg by mouth daily  1     Ibuprofen (ADVIL PO) Take 400 mg by mouth every 8 hours as needed for moderate pain       LORazepam (ATIVAN) 0.5 MG tablet Take 1 tablet (0.5 mg) by mouth every 4 hours as needed for anxiety or other (chest pain) (Patient taking differently: Take 0.5 mg by mouth as needed for anxiety or other (chest pain) ) 12 tablet 0     OXYBUTYNIN CHLORIDE PO Take 5 mg by mouth 2 times daily Clarified as immediate release with [facility]Patient       pravastatin (PRAVACHOL) 20 MG tablet Take 20 mg by mouth At Bedtime   1     ranitidine (ZANTAC) 150 MG capsule Take by mouth 2 times daily       WARFARIN SODIUM PO Take 2.5 mg by mouth Take on Sun Mon, Wed, Fri       WARFARIN SODIUM PO Take 5 mg by mouth daily Tues, Thus, Sat       zolpidem (AMBIEN) 5 MG tablet Take tablet by mouth 15 minutes prior to sleep, for Sleep Study 0.5 tablet 0     triamcinolone (KENALOG) 0.1 % cream Apply topically 2 times daily         ALLERGIES     Allergies   Allergen Reactions     Augmented Betamethasone Diprop [Betamethasone] Other (See Comments)     Severe yeast infection     Petroleum Jelly [Petrolatum] Anaphylaxis     Rash and swelling     Shellfish-Derived Products Anaphylaxis     Tongue swelling     Aspirin Swelling     tiongue swelling     Bacitracin      Rash swelling     Coumadin [Warfarin] Swelling     Leg swelling     Darvon [Propoxyphene] Swelling     Throat closes     Dilaudid  [Hydromorphone]      Levaquin [Levofloxacin] Swelling     Tongue swelling     Neosporin [Neomycin-Polymyx-Gramicid] Swelling     rash     Oxycodone      Severe itching     Percodan [Oxycodone-Aspirin]      Severe itching     Tramadol      Vicodin [Hydrocodone-Acetaminophen]      Severe itching       Xarelto [Rivaroxaban]      Adhesive Tape Rash     Band aids      Codeine Rash       PAST MEDICAL HISTORY:  Past Medical History:   Diagnosis Date     Anemia     Iron Deficiency anemia     Atrial fibrillation (H)      CAD (coronary artery disease)     non-obstructive     Chronic pain     neck, low back, legs     Congestive heart failure (H)      Degenerative disk disease      Fibromyalgia      Gastro-oesophageal reflux disease      Hiatal hernia      Neuropathy      Pernicious anemia      Sleep apnea     uses CPAP.     Urinary incontinence      Vitamin D deficiency        PAST SURGICAL HISTORY:  Past Surgical History:   Procedure Laterality Date     APPENDECTOMY       CHOLECYSTECTOMY       COLONOSCOPY  3/15/2011     CORONARY ANGIOGRAPHY ADULT ORDER       HEART CATH LEFT HEART CATH  12/30/16    medication management     HYSTERECTOMY TOTAL ABDOMINAL       Knee replacement NOS Left      LAPAROSCOPIC NISSEN FUNDOPLICATION N/A 2/4/2015    Procedure: LAPAROSCOPIC NISSEN FUNDOPLICATION;  Surgeon: Armando Ansari MD;  Location: [facility] OR     TONSILLECTOMY       TRANSPOSITION ULNAR NERVE (ELBOW)         FAMILY HISTORY:  Family History   Problem Relation Age of Onset     Alzheimer Disease Mother      Lung Cancer Father      Unknown/Adopted No family hx of        SOCIAL HISTORY:  Social History     Social History     Marital status:      Spouse name: N/A     Number of children: N/A     Years of education: N/A     Social History Main Topics     Smoking status: Former Smoker     Types: Cigarettes     Quit date: 9/1/2014     Smokeless tobacco: Never Used     Alcohol use Yes      Comment: socially     Drug use: None     Sexual  "activity: Not Asked     Other Topics Concern     None     Social History Narrative       Review of Systems:  Skin:  Positive for   \"spots\" on her neck, arms and legs   Eyes:  Positive for glasses    ENT:  Negative      Respiratory:  Positive for dyspnea on exertion;sleep apnea dyspnea is \"not bad'; does not wear CPAP   Cardiovascular:    Positive for;edema;fatigue    Gastroenterology: Positive for vomiting vomiting with certain foods  Genitourinary:  not assessed      Musculoskeletal:  Positive for back pain;arthritis;joint pain;nocturnal cramping;foot pain    Neurologic:  Positive for numbness or tingling of feet neuropathy  Psychiatric:  not assessed      Heme/Lymph/Imm:  Positive for easy bruising RX allergies,  Food allergies   Endocrine:  Positive for diabetes      Physical Exam:  Vitals: /88 (BP Location: Other (Comment), Patient Position: Chair, Cuff Size: Adult Regular)  Pulse 88  Ht 1.727 m (5' 8\")  Wt 129 kg (284 lb 8 oz)  BMI 43.26 kg/m2    Constitutional:  cooperative;in no acute distress morbidly obese      Skin:  warm and dry to the touch          Head:  normocephalic        Eyes:  pupils equal and round;sclera white        Lymph:      ENT:  no pallor or cyanosis        Neck:  no carotid bruit   JVP not well visualized    Respiratory:  clear to auscultation;normal respiratory excursion         Cardiac:   irregularly irregular rhythm distant heart sounds no presence of murmur          not assessed this visit                                        GI:  abdomen soft;non-tender obese      Extremities and Muscular Skeletal:      bilateral LE edema;pitting;R greater than L;trace;1+          Neurological:  affect appropriate;no gross motor deficits        Psych:  Alert and Oriented x 3          CC  Dottie Geller MD  [address]  [place] [id number]                  "

## 2018-10-16 ENCOUNTER — TELEPHONE (OUTPATIENT)
Dept: SLEEP MEDICINE | Facility: CLINIC | Age: 78
End: 2018-10-16

## 2018-10-16 NOTE — TELEPHONE ENCOUNTER
REceived phone call from pharmacist Dary at [facility], asking about RX for Ambien 5 mg, Disp: 0.5 tablet, ?  She wanted to know how to dispense one half tablet.  Did Verbal order for Ambien 5 mg, DISP: 1 tablet, with SIG:  Take one half tablet at sleep study .  Do not take prior to leaving home, take tablet to the sleep study .  Dary will fill the RX .

## 2018-11-01 ENCOUNTER — THERAPY VISIT (OUTPATIENT)
Dept: SLEEP MEDICINE | Facility: CLINIC | Age: 78
End: 2018-11-01
Payer: MEDICARE

## 2018-11-01 DIAGNOSIS — I48.20 CHRONIC ATRIAL FIBRILLATION (H): ICD-10-CM

## 2018-11-01 DIAGNOSIS — G47.33 OSA (OBSTRUCTIVE SLEEP APNEA): ICD-10-CM

## 2018-11-01 PROCEDURE — 95811 POLYSOM 6/>YRS CPAP 4/> PARM: CPT | Performed by: INTERNAL MEDICINE

## 2018-11-01 NOTE — MR AVS SNAPSHOT
After Visit Summary   11/1/2018    Savanna Rehman    MRN: [id number]           Patient Information     Date Of Birth          1940        Visit Information        Provider Department      11/1/2018 8:30 PM BED 5  SLEEP [facility]        Today's Diagnoses     CRISTIANA (obstructive sleep apnea)        Chronic atrial fibrillation (H)        Class 3 obesity due to excess calories with serious comorbidity and body mass index (BMI) of 40.0 to 44.9 in adult          Care Instructions    Completed a split night PSG per provider order.    Preliminary AHI >15.  A final therapeutic PAP pressure was achieved.    Supine REM was seen on therapeutic pressure.    Patient reports feeling refreshed in AM.          Follow-ups after your visit        Your next 10 appointments already scheduled     Nov 06, 2018 11:30 AM CST   Return Sleep Patient with Duc Olivo MD   [facility]    [address]  [place] [address]   [phone]            Dec 05, 2018 11:15 AM CST   New Visit with John Hansen MD   [facility] ([facility])    [address]  [address]  [place] [address]   [phone]              Who to contact     If you have questions or need follow up information about today's clinic visit or your schedule please contact [facility] directly at [phone].  Normal or non-critical lab and imaging results will be communicated to you by MyChart, letter or phone within 4 business days after the clinic has received the results. If you do not hear from us within 7 days, please contact the clinic through MyChart or phone. If you have a critical or abnormal lab result, we will notify you by phone as soon as possible.  Submit refill requests through [employer] or call your pharmacy and they will forward the refill request to us.  Please allow 3 business days for your refill to be completed.          Additional Information About Your Visit        Care EveryWhere ID     This is your Care EveryWhere ID. This could be used by other organizations to access your [facility] medical records  [id number]         Blood Pressure from Last 3 Encounters:   09/18/18 129/88   08/29/18 125/72   05/11/18 113/68    Weight from Last 3 Encounters:   09/18/18 129 kg (284 lb 8 oz)   08/29/18 129.3 kg (285 lb)   05/11/18 129.3 kg (285 lb)              We Performed the Following     Comprehensive Sleep Study     Comprehensive Sleep Study          Today's Medication Changes          These changes are accurate as of 11/1/18 11:59 PM.  If you have any questions, ask your nurse or doctor.               These medicines have changed or have updated prescriptions.        Dose/Directions    LORazepam 0.5 MG tablet   Commonly known as:  ATIVAN   This may have changed:  when to take this   Used for:  Chest pain        Dose:  0.5 mg   Take 1 tablet (0.5 mg) by mouth every 4 hours as needed for anxiety or other (chest pain)   Quantity:  12 tablet   Refills:  0                Primary Care Provider Office Phone # Fax #    Hilary Lucy Finney [phone]       [facility] [address] [facility] [address]        Equal Access to Services     KAYLEIGH MCCLENDON AH: Jatinder barrerao Sorekhaali, waaxda luqadaha, qaybta kaalmada adeegyada, hanny patton. So [facility] [phone].    ATENCIÓN: Si habla español, tiene a maier disposición servicios gratuitos de asistencia lingüística. Erika al [phone].    We comply with applicable federal civil rights laws and [place] laws. We do not discriminate on the basis of race, color, national origin, age, disability, sex, sexual orientation, or gender identity.            Thank you!     Thank you for choosing [facility]  for your care. Our goal is always to provide you with  excellent care. Hearing back from our patients is one way we can continue to improve our services. Please take a few minutes to complete the written survey that you may receive in the mail after your visit with us. Thank you!             Your Updated Medication List - Protect others around you: Learn how to safely use, store and throw away your medicines at www.disposemymeds.org.          This list is accurate as of 11/1/18 11:59 PM.  Always use your most recent med list.                   Brand Name Dispense Instructions for use Diagnosis    ACETAMINOPHEN PO      Take 500 mg by mouth        ADVIL PO      Take 400 mg by mouth every 8 hours as needed for moderate pain        BIOTIN 5000 5 MG Caps   Generic drug:  biotin           glipiZIDE 2.5 MG 24 hr tablet    GLUCOTROL XL     Take 2.5 mg by mouth daily        LORazepam 0.5 MG tablet    ATIVAN    12 tablet    Take 1 tablet (0.5 mg) by mouth every 4 hours as needed for anxiety or other (chest pain)    Chest pain       OXYBUTYNIN CHLORIDE PO      Take 5 mg by mouth 2 times daily Clarified as immediate release with [facility]./Patient        pravastatin 20 MG tablet    PRAVACHOL     Take 20 mg by mouth At Bedtime        ranitidine 150 MG capsule    ZANTAC     Take by mouth 2 times daily        triamcinolone 0.1 % cream    KENALOG     Apply topically 2 times daily        * WARFARIN SODIUM PO      Take 2.5 mg by mouth Take on Sun Mon, Wed, Fri        * WARFARIN SODIUM PO      Take 5 mg by mouth daily Tues, Thus, Sat        zolpidem 5 MG tablet    AMBIEN    0.5 tablet    Take tablet by mouth 15 minutes prior to sleep, for Sleep Study    CRISTIANA (obstructive sleep apnea)       * Notice:  This list has 2 medication(s) that are the same as other medications prescribed for you. Read the directions carefully, and ask your doctor or other care provider to review them with you.

## 2018-11-02 NOTE — PATIENT INSTRUCTIONS
Completed a split night PSG per provider order.    Preliminary AHI >15.  A final therapeutic PAP pressure was achieved.    Supine REM was seen on therapeutic pressure.    Patient reports feeling refreshed in AM.

## 2018-11-05 LAB — SLPCOMP: NORMAL

## 2018-11-05 NOTE — PROCEDURES
"SLEEP STUDY INTERPRETATION  SPLIT NIGHT STUDY      Patient: KRIS SANDERSON  YOB: 1940  Study Date: 11/1/2018  MRN: [id number]  Referring Provider: MD Finney Holly  Ordering Provider: MD Olivo Abdullahi    Indications for Polysomnography: The patient is a 77 y old Female who is 5' 7\" and weighs 278.0 lbs. Her BMI is 43.6, Fordyce sleepiness scale 6.0 and neck circumference is 44.0 cm. Relevant medical history includes CAD, CHF, atrial fibrillation and previously diagnosed CRISTIANA (AHI 33 in 2011). A diagnostic polysomnogram was performed to evaluate for sleep apnea. After 148.5 minutes of sleep time the patient exhibited sufficient respiratory events qualifying her for a CPAP trial which was then initiated.    Polysomnogram Data: A full night polysomnogram recorded the standard physiologic parameters including EEG, EOG, EMG, ECG, nasal and oral airflow. Respiratory parameters of chest and abdominal movements were recorded with respiratory inductance plethysmography. Oxygen saturation was recorded by pulse oximetry.  Hypopnea scoring rule used: 1B 4%    Diagnostic PSG  Sleep Architecture: Sleep was fragmented.   The total recording time of the polysomnogram was 188.8 minutes. The total sleep time was 148.5 minutes. Sleep latency was decreased at 7.7 minutes without the use of a sleep aid. REM latency was - minutes. Arousal index was increased at 31.1 arousals per hour. Sleep efficiency was decreased at 78.7%. Wake after sleep onset was 28.0 minutes. The patient spent 3.7% of total sleep time in Stage N1, 35.0% in Stage N2, 61.3% in Stage N3, and 0.0% in REM. Time in REM supine was - minutes.    Respiration: Moderate obstructive sleep apnea and sustained hypoxemia.     Events ? The polysomnogram revealed a presence of 2 obstructive, - central, and - mixed apneas resulting in an apnea index of 0.8 events per hour. There were 67 obstructive hypopneas and - central hypopneas resulting in an obstructive " hypopnea index of 27.1 and central hypopnea index of - events per hour. The combined apnea/hypopnea index was 27.9 events per hour (central apnea/hypopnea index was - events per hour).  The REM AHI was - events per hour. The supine AHI was - events per hour. The RERA index was 2.8 events per hour. The RDI was 30.7 events per hour.    Snoring - was reported as loud.    Respiratory rate and pattern - was notable for normal respiratory rate and pattern.    Sustained Sleep Associated Hypoventilation - Transcutaneous carbon dioxide monitoring was used, however significant hypoventilation was not present with a maximum change from 37.9 to 42.5 mmHg and 0 minutes at or greater than 55 mmHg.    Sleep Associated Hypoxemia - (Greater than 5 minutes O2 sat at or below 88%) was present. Baseline oxygen saturation was 87.1%. Lowest oxygen saturation was 78.6%. Time spent less than or equal to 88% was 124.8 minutes. Time spent less than or equal to 89% was 131.4 minutes.     Treatment PSG  Sleep Architecture: CPAP effectively treated sleep apnea. Hypoxemia remained despite correction of sleep disordered breathing. Hypoxemia was corrected with supplemental Oxygen at 1L/min.   At 03:04:45 AM the patient was placed on PAP treatment and was titrated at pressures ranging from CPAP 5 cmH2O up to CPAP 12 O2:2 cmH2O. The total recording time of the treatment portion of the study was 223.5 minutes. The total sleep time was 134.5 minutes. During the treatment portion of the study the sleep latency was 12.5 minutes. REM latency was 39.0 minutes. Arousal index was increased at 29.4 arousals per hour. Sleep efficiency was decreased at 60.2%. Wake after sleep onset was 72.5 minutes. The patient spent 6.3% of total sleep time in Stage N1, 41.3% in Stage N2, 33.5% in Stage N3, and 19.0% in REM. Time in REM supine was 25.5 minutes.     Respiration: Hypoxemia     The final pressure was CPAP 12 O2:2 cmH2O with an AHI of 46.2 events per hour. Time  in REM supine on final pressure was 7.0 minutes.     Movement Activity: Mildly increased periodic limb movement frequency without significant arousals.     Periodic Limb Movements  o During the diagnostic portion of the study, there were 90 PLMs recorded. The PLM index was 36.4 movements per hour. The PLM Arousal Index was 3.2 per hour.  o During the treatment portion of the study, there were 49 PLMs recorded. The PLM index was 21.9 movements per hour. The PLM Arousal Index was 0.4 per hour.    REM EMG Activity - Excessive transient/sustained muscle activity was not present.    Nocturnal Behavior - Abnormal sleep related behaviors were not noted during/arising out of NREM / REM sleep.     Bruxism - None apparent.    Cardiac Summary: Atrial fibrillation.   During the diagnostic portion of the study, the average pulse rate was 73.4 bpm. The minimum pulse rate was 53.7 bpm while the maximum pulse rate was 95.7 bpm.    During the treatment portion of the study, the average pulse rate was 68.3 bpm. The minimum pulse rate was 49.1 bpm while the maximum pulse rate was 95.0 bpm.     Arrhythmias were noted.    Assessment:     This sleep study shows moderate obstructive sleep apnea and sustained hypoxemia at baseline.     CPAP titration was performed and sleep disordered breathing events responded to CPAP.  An optimal pressure in supine REM could not be finalized in the test.     Hypoxemia remained after correction of sleep disordered breathing and was corrected by addition of supplemental O2 at 1L/min.     Recommendations:    Treatment of CRISTIANA with Auto?titrating PAP therapy with a range of 10 cmH2O to 15 cmH2O. Recommend clinical follow up with sleep management team, including review of compliance measures.    Supplemental oxygen at 1L/min.     Weight management.    Pharmacologic therapy should be used for management of restless legs syndrome only if present and clinically indicated and not based on the presence of periodic  limb movements alone.    Diagnostic Codes:   Obstructive Sleep Apnea G47.33  Sleep Hypoxemia G47.36   Repetitive Intrusions Into Sleep F51.8      11/1/2018 [facility] Sleep Study (278.0 lbs) - AHI 27.9, RDI 30.7, Supine AHI -, REM AHI -, Low O2% 78.6%, Time Spent ?88% 124.8, Time Spent ?89% 131.4. Treatment was titrated to a pressure of CPAP 12 O2:2 with an AHI 46.2. Time spent in REM supine at this pressure was 7.0 minutes.      _____________________________________   Electronically Signed By: Angel Moy MD 11/05/2018

## 2018-11-06 ENCOUNTER — OFFICE VISIT (OUTPATIENT)
Dept: SLEEP MEDICINE | Facility: CLINIC | Age: 78
End: 2018-11-06
Payer: MEDICARE

## 2018-11-06 ENCOUNTER — DOCUMENTATION ONLY (OUTPATIENT)
Dept: SLEEP MEDICINE | Facility: CLINIC | Age: 78
End: 2018-11-06

## 2018-11-06 ENCOUNTER — TELEPHONE (OUTPATIENT)
Dept: SLEEP MEDICINE | Facility: CLINIC | Age: 78
End: 2018-11-06

## 2018-11-06 VITALS
HEIGHT: 68 IN | WEIGHT: 285 LBS | BODY MASS INDEX: 43.19 KG/M2 | RESPIRATION RATE: 16 BRPM | HEART RATE: 71 BPM | OXYGEN SATURATION: 95 %

## 2018-11-06 DIAGNOSIS — G47.33 OSA (OBSTRUCTIVE SLEEP APNEA): Primary | ICD-10-CM

## 2018-11-06 DIAGNOSIS — G47.34 SLEEP RELATED HYPOXIA: ICD-10-CM

## 2018-11-06 DIAGNOSIS — G47.61 PLMD (PERIODIC LIMB MOVEMENT DISORDER): ICD-10-CM

## 2018-11-06 DIAGNOSIS — E66.01 MORBID OBESITY WITH BMI OF 40.0-44.9, ADULT (H): ICD-10-CM

## 2018-11-06 PROCEDURE — 99214 OFFICE O/P EST MOD 30 MIN: CPT | Performed by: INTERNAL MEDICINE

## 2018-11-06 NOTE — MR AVS SNAPSHOT
After Visit Summary   11/6/2018    Savanna Rehman    MRN: [id number]           Patient Information     Date Of Birth          1940        Visit Information        Provider Department      11/6/2018 11:30 AM Duc Olivo MD [facility] [place]        Today's Diagnoses     CRISTIANA (obstructive sleep apnea)    -  1    Sleep related hypoxia        Morbid obesity with BMI of 40.0-44.9, adult (H)        PLMD (periodic limb movement disorder)          Care Instructions    MY TREATMENT INFORMATION FOR SLEEP APNEA-  Savanna Rehman    MY CONTACT NUMBERS ARE:  [phone]  DOCTOR : Duc Olivo MD  SLEEP CENTER :  [place]  CPAP EQUIPMENT     You have moderate sleep apnea with low oxygen, auto-CPAP with oxygen are prescribed for you.   AHI 0-5/hr normal  AHI 5-15 evens of hour is a mild sleep apnea  AHI 15-30/hr is moderate sleep apnea  AHI over 30/hr is severe sleep apnea)    CPAP therapy includes adaptation to a mask interface and a delivery of air pressure.    You will be provided with an auto-titrating CPAP with a pressure range of 10-15 cmH2O with 2 lpm of O2 and with heated humidity to limit nasal congestion. Adjust the heat level on humidifier to find a setting that prevents dry nose but does not cause condensation in the hose or mask. Use distilled water in the humidifier.    The CPAP has a ramp function that starts the pressure lower than your prescribed pressure and gradually increases it over a number of minutes.  This may make it easier to fall asleep.                  Try to use the CPAP every-night, all night, at least 7-8 hours each night.  Daytime naps are not advised, but use CPAP if taking naps. Many insurances require that we prove you are using the CPAP at least 4 hours on at least 70% of nights over a 30 day period. We have 90 days to meet those criteria.    Objective measure goal  Compliance  Goal >70% (preferrably 100%)  Leak   Goal < 10% (less than 24  "L/min)  AHI  Goal < 5 events per hour   Usage  Goal 7-8 hours.         Patient was advised not to drive if drowsy or sleepy.                Discussed weight management and the impact of weight gain on sleep apnea.  Let me know if you snore or feel the pressure is too high.    - Try mask desensitization as below and do not remove mask headgear when you go to bathroom at night, but just unplug the hose.    You can get new supplies (mask, hose and filter) for your CPAP every 3-6 months, covered by insurance. You do not need to get supplies that often, but they are available if you would like them.  You may exchange the mask once within the first month if you feel the initial mask does not fit well.  Contact your medical equipment provider for equipment issues.  Please let me know if you have any return of snoring, daytime sleepiness or poor sleep quality. We will want to make sure your CPAP is adequately treating your apnea.  There is a website called CPAP.com that has accessories that may make CPAP use easier. Please visit it at your convenience.    Our phone number is [phone]    Follow-up 4-6 weeks after PAP usage.  Bring your CPAP machine with you to the follow up appointment.    Frequently asked questions:  1. What is Obstructive Sleep Apnea (CRISTIANA)? CRISTIANA is the most common type of sleep apnea. Apnea literally means, \"without breath.\" It is characterized by repetitive pauses in breathing, despite continued effort to breathe, and is usually associated with a reduction in blood oxygen saturation. Apneas can last 10 to over 60 seconds. It is caused by narrowing or collapse of the upper airway as muscles relax during sleep. Severity of sleep apnea is determined by frequency of breathing events and their effect on your sleep and oxygen levels determined during sleep testing.   2. What are the consequences of CRISTIANA? Symptoms include: daytime sleepiness- possibly increasing the risk of falling asleep while driving, " unrefreshing/restless sleep, snoring, insomnia, waking frequently to urinate, waking with heartburn or reflux, reduced concentration and memory, and morning headaches. Other health consequences may include development of high blood pressure and other cardiovascular disease in persons who are susceptible. Untreated CRISTIANA  can contribute to heart disease, stroke and diabetes.   3. What are the treatment options? In most situations, sleep apnea is a lifelong disease that must be managed with daily therapy. Medications are not effective for sleep apnea and surgery is generally not performed until other therapies have been tried. Therapy is usually tailored to the individual patient based on many factors including your wishes as well as severity of sleep apnea and severity of obesity. Continuous Positive Airway (CPAP) is the most reliable treatment. An oral device to hold your jaw forward is usually      IF I HAVE SLEEP APNEA.....  WHERE CAN I FIND MORE INFORMATION?    American Academy of Sleep Medicine Patient information on sleep disorders:  http://yoursleep.aasmnet.org    THINGS I SHOULD REMEMBER  In most situations, sleep apnea is a lifelong disease that must be managed with daily therapy. Untreated disease, when severe, may result in an increased risk for an array of problems from heart disease to mood changes, car accidents and shorter lifespan.    CPAP-  WHY AND HOW?                                    Continuous positive airway pressure, or CPAP, is the most effective treatment for obstructive sleep apnea. A decision to use CPAP is a major step forward in the pursuit of a healthier life. The successful use of CPAP will help you breathe easier, sleep better and live healthier. Using CPAP can be a positive experience if you keep these brown points in mind:  1. Commitment  CPAP is not a quick fix for your problem. It involves a long-term commitment to improve your sleep and your health.    2. Communication  Stay in close  "communication with both your sleep doctor and your CPAP supplier. Ask lots of questions and seek help when you need it.    3. Consistency  Use CPAP all night, every night and for every nap. You will receive the maximum health benefits from CPAP when you use it every time that you sleep. This will also make it easier for your body to adjust to the treatment.    4. Correction  The first machine and mask that you try may not be the best ones for you. Work with your sleep doctor and your CPAP supplier to make corrections to your equipment selection. Ask about trying a different type of machine or mask if you have ongoing problems. Make sure that your mask is a good fit and learn to use your equipment properly.    5. Challenge  Tell a family member or close friend to ask you each morning if you used your CPAP the previous night. Have someone to challenge you to give it your best effort.    6. Connection   Your adjustment to CPAP will be easier if you are able to connect with others who use the same treatment. Ask your sleep doctor if there is a support group in your area for people who have sleep apnea, or look for one on the Internet.    7. Comfort   Increase your level of comfort by using a saline spray, decongestant or heated humidifier if CPAP irritates your nose, mouth or throat. Use your unit's \"ramp\" setting to slowly get used to the air pressure level. There may be soft pads you can buy that will fit over your mask straps. Look on www.CPAP.com for accessories such as these straps, a pillow contoured for side-sleeping with CPAP, longer hoses, hose covers to reduce condensation, or stands to keep the hose out of your way.                                                              8. Cleaning   Clean your mask, tubing and headgear on a regular basis. Put this time in your schedule so that you don't forget to do it. Check and replace the filters for your CPAP unit and humidifier.    9. Completion   Although you are " never finished with CPAP therapy, you should reward yourself by celebrating the completion of your first month of treatment. Expect this first month to be your hardest period of adjustment. It will involve some trial and error as you find the machine, mask and pressure settings that are right for you.    10. Continuation  After your first month of treatment, continue to make a daily commitment to use your CPAP all night, every night and for every nap.    CPAP-Tips to starting with success:  Begin using your CPAP for short periods of time during the day while you watch TV or read.    Use CPAP every night and for every nap. Using it less often reduces the health benefits and makes it harder for your body to get used to it.    Newer CPAP models are virtually silent; however, if you find the sound of your CPAP machine to be bothersome, place the unit under your bed to dampen the sound.     Make small adjustments to your mask, tubing, straps and headgear until you get the right fit. Tightening the mask may actually worsen the leak.  If it leaves significant marks on your face or irritates the bridge of your nose, it may not be the best mask for you.  Speak with the person who supplied the mask and consider trying other masks.    Use a saline nasal spray to ease mild nasal congestion. Neti-Pot or saline nasal rinses may also help. Nasal gel sprays can help reduce nasal dryness.  Biotene mouthwash can be helpful to protect your teeth if you experience frequent dry mouth.  Dry mouth may be a sign of air escaping out of your mouth or out of the mask in the case of a full face mask.  Speak with your provider if you expect that is the case.     Take a nasal decongestant to relieve more severe nasal or sinus congestion.  Do not use Afrin (oxymetazoline) nasal spray more than 3 days in a row.  Speak with your sleep doctor if your nasal congestion is chronic.    Use a heated humidifier that fits your CPAP model to enhance your  "breathing comfort. Adjust the heat setting up if you get a dry nose or throat, down if you get condensation in the hose or mask.  Position the CPAP lower than you so that any condensation in the hose drains back into the machine rather than towards the mask.    Try a system that uses nasal pillows if traditional masks give you problems.    Clean your mask, tubing and headgear once a week. Make sure the equipment dries fully.    Regularly check and replace the filters for your CPAP unit and humidifier.    Work closely with your sleep doctor and your CPAP supplier to make sure that you have the machine, mask and air pressure setting that works best for you.        MASK DESENSITIZATION:    If you are experiencing some anxiety about trying a PAP mask or breathing with pressurized air try the following steps in sequence to get used to PAP. This is called \"desensitization\".    1.  Wear mask (disconnected from the device) for 60 minutes daily awake and use a distraction: Sit and watch TV, read, or listen to music with the mask on. Take the mask off and put it back on, as needed. This can be in a chair in the living room, for example.    2.  When you can use the mask without taking it off for 60 minutes and without anxiety, then proceed to step 3.    3.  Attach the mask to the PAP device, and switch the unit  on  and practice breathing through the mask for one hour while watching television, reading or performing some other sedentary, distracting activity.     4.  Once you are comfortable wearing PAP for 30-60 minutes without anxiety while distracted with an activity, try to use it in bed. You can continue distraction in bed by watching TV, reading, listening to music or an audio book, etc.  The main goal is to  not think about using PAP  but pay attention to relaxing.    5. Use the PAP during scheduled one-hour naps at home.    6. Use PAP during initial 3-4 hours of nocturnal sleep.    7. Use PAP through an entire night of " sleep.      Your BMI is Body mass index is 43.33 kg/(m^2).  Weight management is a personal decision.  If you are interested in exploring weight loss strategies, the following discussion covers the approaches that may be successful. Body mass index (BMI) is one way to tell whether you are at a healthy weight, overweight, or obese. It measures your weight in relation to your height.  A BMI of 18.5 to 24.9 is in the healthy range. A person with a BMI of 25 to 29.9 is considered overweight, and someone with a BMI of 30 or greater is considered obese. More than two-thirds of American adults are considered overweight or obese.  Being overweight or obese increases the risk for further weight gain. Excess weight may lead to heart disease and diabetes.  Creating and following plans for healthy eating and physical activity may help you improve your health.  Weight control is part of healthy lifestyle and includes exercise, emotional health, and healthy eating habits. Careful eating habits lifelong are the mainstay of weight control. Though there are significant health benefits from weight loss, long-term weight loss with diet alone may be very difficult to achieve- studies show long-term success with dietary management in less than 10% of people. Attaining a healthy weight may be especially difficult to achieve in those with severe obesity. In some cases, medications, devices and surgical management might be considered.  What can you do?  If you are overweight or obese and are interested in methods for weight loss, you should discuss this with your provider.     Consider reducing daily calorie intake by 500 calories.     Keep a food journal.     Avoiding skipping meals, consider cutting portions instead.    Diet combined with exercise helps maintain muscle while optimizing fat loss. Strength training is particularly important for building and maintaining muscle mass. Exercise helps reduce stress, increase energy, and  improves fitness. Increasing exercise without diet control, however, may not burn enough calories to loose weight.       Start walking three days a week 10-20 minutes at a time    Work towards walking thirty minutes five days a week     Eventually, increase the speed of your walking for 1-2 minutes at time    In addition, we recommend that you review healthy lifestyles and methods for weight loss available through the National Institutes of Health patient information sites:  http://win.niddk.nih.gov/publications/index.htm    And look into health and wellness programs that may be available through your health insurance provider, employer, local community center, or kanchan club.    Weight management plan: Patient was referred to their PCP to discuss a diet and exercise plan.     Your blood pressure was checked while you were in clinic today.  Please read the guidelines below about what these numbers mean and what you should do about them.  Your systolic blood pressure is the top number.  This is the pressure when the heart is pumping.  Your diastolic blood pressure is the bottom number.  This is the pressure in between beats.  If your systolic blood pressure is less than 120 and your diastolic blood pressure is less than 80, then your blood pressure is normal. There is nothing more that you need to do about it  If your systolic blood pressure is 120-139 or your diastolic blood pressure is 80-89, your blood pressure may be higher than it should be.  You should have your blood pressure re-checked within a year by a primary care provider.  If your systolic blood pressure is 140 or greater or your diastolic blood pressure is 90 or greater, you may have high blood pressure.  High blood pressure is treatable, but if left untreated over time it can put you at risk for heart attack, stroke, or kidney failure.  You should have your blood pressure re-checked by a primary care provider within the next four weeks.               "Follow-ups after your visit        Your next 10 appointments already scheduled     Dec 05, 2018 11:15 AM CST   New Visit with John Hansen MD   [facility]   [place] ([facility])    [address]  [address]  [place] [address]   [phone]              Future tests that were ordered for you today     Open Future Orders        Priority Expected Expires Ordered    Overnight oximetry study Routine  5/5/2019 11/6/2018            Who to contact     If you have questions or need follow up information about today's clinic visit or your schedule please contact [facility] directly at [phone].  Normal or non-critical lab and imaging results will be communicated to you by MyChart, letter or phone within 4 business days after the clinic has received the results. If you do not hear from us within 7 days, please contact the clinic through MyChart or phone. If you have a critical or abnormal lab result, we will notify you by phone as soon as possible.  Submit refill requests through [employer] or call your pharmacy and they will forward the refill request to us. Please allow 3 business days for your refill to be completed.          Additional Information About Your Visit        Care EveryWhere ID     This is your Care EveryWhere ID. This could be used by other organizations to access your [facility] medical records  [id number]        Your Vitals Were     Pulse Respirations Height Pulse Oximetry BMI (Body Mass Index)       71 16 1.727 m (5' 8\") 95% 43.33 kg/m2        Blood Pressure from Last 3 Encounters:   09/18/18 129/88   08/29/18 125/72   05/11/18 113/68    Weight from Last 3 Encounters:   11/06/18 129.3 kg (285 lb)   09/18/18 129 kg (284 lb 8 oz)   08/29/18 129.3 kg (285 lb)              We Performed the Following     Comprehensive DME          Today's Medication Changes          These changes are accurate as of 11/6/18 11:46 AM.  If you have " any questions, ask your nurse or doctor.               Start taking these medicines.        Dose/Directions    order for DME   Used for:  CRISTIANA (obstructive sleep apnea), Sleep related hypoxia   Started by:  Duc Olivo MD        Oxygen 2 Li/min at night bleed with CPAP   Refills:  0         These medicines have changed or have updated prescriptions.        Dose/Directions    LORazepam 0.5 MG tablet   Commonly known as:  ATIVAN   This may have changed:  when to take this   Used for:  Chest pain        Dose:  0.5 mg   Take 1 tablet (0.5 mg) by mouth every 4 hours as needed for anxiety or other (chest pain)   Quantity:  12 tablet   Refills:  0            Where to get your medicines      Information about where to get these medications is not yet available     ! Ask your nurse or doctor about these medications     order for DME                Primary Care Provider Office Phone # Fax #    Hilary Finney [phone]       [facility] [address] [address] [facility] [address]        Equal Access to Services     KAYLEIGH MCCLENDON : Hadii naif barrerao Somadeline, waaxda luqadaha, qaybta kaalmada adeegyada, hanny hendrickson . So [facility] [phone].    ATENCIÓN: Si habla español, tiene a maier disposición servicios gratuitos de asistencia lingüística. [facility] [phone].    We comply with applicable federal civil rights laws and [place] laws. We do not discriminate on the basis of race, color, national origin, age, disability, sex, sexual orientation, or gender identity.            Thank you!     Thank you for choosing [facility]  for your care. Our goal is always to provide you with excellent care. Hearing back from our patients is one way we can continue to improve our services. Please take a few minutes to complete the written survey that you may receive in the mail after your visit with us. Thank you!             Your Updated Medication List  - Protect others around you: Learn how to safely use, store and throw away your medicines at www.disposemymeds.org.          This list is accurate as of 11/6/18 11:46 AM.  Always use your most recent med list.                   Brand Name Dispense Instructions for use Diagnosis    ACETAMINOPHEN PO      Take 500 mg by mouth        ADVIL PO      Take 400 mg by mouth every 8 hours as needed for moderate pain        BIOTIN 5000 5 MG Caps   Generic drug:  biotin           glipiZIDE 2.5 MG 24 hr tablet    GLUCOTROL XL     Take 2.5 mg by mouth daily        LORazepam 0.5 MG tablet    ATIVAN    12 tablet    Take 1 tablet (0.5 mg) by mouth every 4 hours as needed for anxiety or other (chest pain)    Chest pain       order for DME      Oxygen 2 Li/min at night bleed with CPAP    CRISTIANA (obstructive sleep apnea), Sleep related hypoxia       OXYBUTYNIN CHLORIDE PO      Take 5 mg by mouth 2 times daily Clarified as immediate release with [facility]./Patient        pravastatin 20 MG tablet    PRAVACHOL     Take 20 mg by mouth At Bedtime        ranitidine 150 MG capsule    ZANTAC     Take by mouth 2 times daily        triamcinolone 0.1 % cream    KENALOG     Apply topically 2 times daily        * WARFARIN SODIUM PO      Take 2.5 mg by mouth Take on Sun Mon, Wed, Fri        * WARFARIN SODIUM PO      Take 5 mg by mouth daily Tues, Thus, Sat        zolpidem 5 MG tablet    AMBIEN    0.5 tablet    Take tablet by mouth 15 minutes prior to sleep, for Sleep Study    CRISTIANA (obstructive sleep apnea)       * Notice:  This list has 2 medication(s) that are the same as other medications prescribed for you. Read the directions carefully, and ask your doctor or other care provider to review them with you.

## 2018-11-06 NOTE — NURSING NOTE
"Chief Complaint   Patient presents with     RECHECK     F/u Psg, restart cpap       Initial Pulse 71  Resp 16  Ht 1.727 m (5' 8\")  Wt 129.3 kg (285 lb)  SpO2 95%  BMI 43.33 kg/m2 Estimated body mass index is 43.33 kg/(m^2) as calculated from the following:    Height as of this encounter: 1.727 m (5' 8\").    Weight as of this encounter: 129.3 kg (285 lb).    Medication Reconciliation: complete    Jeanne Zepeda LPN/BALBINA    DME: N  "

## 2018-11-06 NOTE — PROGRESS NOTES
This nurse called in Oxygen order to [facility] today per Dr. Olivo.      Jeanne Zepeda LPN

## 2018-11-06 NOTE — PROGRESS NOTES
Sleep Study Follow-Up Visit:    Date on this visit: 11/6/2018    ASSESSMENT / PLAN:       CRISTIANA (obstructive sleep apnea)  Sleep related hypoxia  Morbid obesity with BMI of 40.0-44.9, adult (H)  PLMD (periodic limb movement disorder)    Discussed with patient the recent sleep study and treatment options, during CPAP titration and optimal CPAP therapy goal was to receive it and patient had high AHI with the loss of his CPAP titration of 12 cmH2O was 2 L of oxygen.  We recommend Auto-CPAP 10-15 cmH2O with 2 lpm of O2 and p/an change to fixed CPAP in next clinic visit if needed. in addition to weight loss and avoiding sleeping supine position. Patient is recommended to improve his sleep-wake schedule and good sleep hygiene. Patient was advised to use PAP therapy for at least 7-8 hours and during naps (naps are not recommended).  Instructions given. Follow up 4-6 weeks after PAP use.  Sleep-related hypoxemia (Lowest O2 saturation was 78.6 % and   She spent 28.8 minutes below 88% SpO2 she was titrated with oxygen during CPAP titration) may very well resolve once respiratory events (obstruction/apneas) caused by sleep disordered breathing is addressed and supplemental O2 therapy.  She is not meeting the criteria for Medicare oxygen requirement, will obtain overnight oximetry while using CPAP alone. At CPAP of 11 cmH2O, her AHI was 0/hr for 11 minutes and her highest O 2 saturation was 87% during that period.   Counseled regarding weight loss through diet modification and increased physical activity. Patient was given instuctions of weight loss and advised to follow up his/her PCP for further weight loss interventions.    Periodic leg movements related to her leg pains, no restless legs.    All questions were answered.  The patient indicates understanding of the above issues and agrees with the plan set forth.    Orders Placed This Encounter   Procedures     Comprehensive DME     Overnight oximetry study         Oxygen  order to bleed CPAP    She will follow up with me in about 2 month(s).       BRIEF SUMMARY:    Savanna Rehman is a 77 year old female with history of obstructive sleep apnea, atrial fibrillation, congestive heart failure, coronary artery disease, fibromyalgia, neuropathy, GERD and hiatal hernia and chronic neck and lower back and leg pains comes in today for follow-up of her sleep study done on 18 at the [facility] for result of sleep study. Please see H&P for his presenting sleep symptoms for additional details.  Patient was previously treated with CPAP therapy but she failed compliance.  Repeat sleep study was obtained.    PREVIOUS SLEEP STUDIES: done in [place]   Date: 2011  AHI: 33.2/hr  Intervention: auto-CPAP 4-6 cmH2O  Lowest O2 saturation: 80%     Date: prior to above study  AHI: 108/hr  Intervention: CPAP  Lowest O2 saturation: 69%    PS2018  Sleep latency 7.7 minutes without sleep aid.    REM not achieved.       Sleep efficiency 78.7 %. Total sleep time 148.5 minutes.  Sleep architecture:  Stage 1, 3.7 % (5%), stage 2, 35 % (45-55%), stage 3, 61.3 % (15-20%), stage REM, 0 % (20-25%).    AHI was 27.9/hour.   CSAI was 0/hour.   RDI 30.7/hour.    REM AHI N/A.    Supine AHI N/A.    Lowest O2 saturation: 78.6 %  She spent 28.8 minutes below 88% SpO2.   Periodic Limb Movement Index 36.4/hour.       CPAP titration:    Sleep latency 12.5 minutes.    REM achieved. REM latency 39 minutes.    Sleep efficiency 60.2 %. Total sleep time 34.5 minutes.   Sleep architecture:  Stage 1, 6.3 % (5%), stage 2, 41.3 % (45-55%), stage 3, 33.5 % (15-20%), stage REM, 19 % (20-25%).    Periodic Limb Movement Index 1.9/hour.   CPAP was titrated to 12 cmH2O with 2 L of oxygen failed to control apneas, hypopneas (residual AHI 46.2/hr) but O2 desaturations improved.   Supine REM was noted at this pressure.     These findings were reviewed with the patient and copy of the sleep study result was  given.    Past medical/surgical history, family history, social history, medications and allergies were reviewed.      Problem List:  Patient Active Problem List    Diagnosis Date Noted     Morbid obesity (H) 08/29/2018     Priority: Medium     CRISTIANA (obstructive sleep apnea) 08/29/2018     Priority: Medium     Acute on chronic diastolic (congestive) heart failure (H) 12/29/2016     Priority: Medium     Chest pain 03/20/2015     Priority: Medium     Chest pain at rest 03/19/2015     Priority: Medium     Angioedema 03/09/2015     Priority: Medium     Hiatal hernia 02/04/2015     Priority: Medium     Diaphragmatic hernia 12/29/2014     Priority: Medium     Problem list name updated by automated process. Provider to review               Medications:     Current Outpatient Prescriptions   Medication Sig     ACETAMINOPHEN PO Take 500 mg by mouth     biotin (BIOTIN 5000) 5 MG CAPS      glipiZIDE (GLUCOTROL XL) 2.5 MG 24 hr tablet Take 2.5 mg by mouth daily     Ibuprofen (ADVIL PO) Take 400 mg by mouth every 8 hours as needed for moderate pain     LORazepam (ATIVAN) 0.5 MG tablet Take 1 tablet (0.5 mg) by mouth every 4 hours as needed for anxiety or other (chest pain) (Patient taking differently: Take 0.5 mg by mouth as needed for anxiety or other (chest pain) )     OXYBUTYNIN CHLORIDE PO Take 5 mg by mouth 2 times daily Clarified as immediate release with [facility]./Patient     pravastatin (PRAVACHOL) 20 MG tablet Take 20 mg by mouth At Bedtime      ranitidine (ZANTAC) 150 MG capsule Take by mouth 2 times daily     triamcinolone (KENALOG) 0.1 % cream Apply topically 2 times daily     WARFARIN SODIUM PO Take 2.5 mg by mouth Take on Sun Mon, Wed, Fri     WARFARIN SODIUM PO Take 5 mg by mouth daily Tues, Thus, Sat     zolpidem (AMBIEN) 5 MG tablet Take tablet by mouth 15 minutes prior to sleep, for Sleep Study     No current facility-administered medications for this visit.      Facility-Administered Medications Ordered in  "Other Visits   Medication     nitroglycerin 100 MCG/ML injection          PHYSICAL EXAMINATION:  Pulse 71  Resp 16  Ht 1.727 m (5' 8\")  Wt 129.3 kg (285 lb)  SpO2 95%  BMI 43.33 kg/m2          Data: All pertinent previous laboratory data reviewed     No results found for: PH, PHARTERIAL, PO2, [id number], SAT, PCO2, HCO3, BASEEXCESS, DIVINA, BEB  No results found for: TSH  Lab Results   Component Value Date     (H) 05/04/2018     (H) 01/14/2018     Lab Results   Component Value Date    HGB 15.3 05/04/2018    HGB 15.4 01/14/2018     Lab Results   Component Value Date    BUN 16 05/04/2018    BUN 15 01/14/2018    CR 1.01 05/04/2018    CR 0.87 01/14/2018     No results found for: ARMAND     Twenty-five minutes spent with patient, all of which were spent face-to-face counseling, consulting, coordinating plan of care, going over sleep test results and chart review.          Duc Olivo MD 11/6/2018   [facility]  [address], [place] [address]   [phone] Clinic      CC: No ref. provider found  Copy to: Hilary Finney    "

## 2018-11-06 NOTE — PATIENT INSTRUCTIONS
MY TREATMENT INFORMATION FOR SLEEP APNEA-  Savanna Rehman    MY CONTACT NUMBERS ARE:  [phone]  DOCTOR : Duc Olivo MD  SLEEP CENTER :  [place]  CPAP EQUIPMENT     You have moderate sleep apnea with low oxygen, auto-CPAP with oxygen are prescribed for you.   AHI 0-5/hr normal  AHI 5-15 evens of hour is a mild sleep apnea  AHI 15-30/hr is moderate sleep apnea  AHI over 30/hr is severe sleep apnea)    CPAP therapy includes adaptation to a mask interface and a delivery of air pressure.    You will be provided with an auto-titrating CPAP with a pressure range of 10-15 cmH2O with 2 lpm of O2 and with heated humidity to limit nasal congestion. Adjust the heat level on humidifier to find a setting that prevents dry nose but does not cause condensation in the hose or mask. Use distilled water in the humidifier.    The CPAP has a ramp function that starts the pressure lower than your prescribed pressure and gradually increases it over a number of minutes.  This may make it easier to fall asleep.                  Try to use the CPAP every-night, all night, at least 7-8 hours each night.  Daytime naps are not advised, but use CPAP if taking naps. Many insurances require that we prove you are using the CPAP at least 4 hours on at least 70% of nights over a 30 day period. We have 90 days to meet those criteria.    Objective measure goal  Compliance  Goal >70% (preferrably 100%)  Leak   Goal < 10% (less than 24 L/min)  AHI  Goal < 5 events per hour   Usage  Goal 7-8 hours.         Patient was advised not to drive if drowsy or sleepy.                Discussed weight management and the impact of weight gain on sleep apnea.  Let me know if you snore or feel the pressure is too high.    - Try mask desensitization as below and do not remove mask headgear when you go to bathroom at night, but just unplug the hose.    You can get new supplies (mask, hose and filter) for your CPAP every 3-6 months, covered by insurance.  "You do not need to get supplies that often, but they are available if you would like them.  You may exchange the mask once within the first month if you feel the initial mask does not fit well.  Contact your medical equipment provider for equipment issues.  Please let me know if you have any return of snoring, daytime sleepiness or poor sleep quality. We will want to make sure your CPAP is adequately treating your apnea.  There is a website called CPAP.com that has accessories that may make CPAP use easier. Please visit it at your convenience.    Our phone number is [phone]    Follow-up 4-6 weeks after PAP usage.  Bring your CPAP machine with you to the follow up appointment.    Frequently asked questions:  1. What is Obstructive Sleep Apnea (CRISTIANA)? CRISTIANA is the most common type of sleep apnea. Apnea literally means, \"without breath.\" It is characterized by repetitive pauses in breathing, despite continued effort to breathe, and is usually associated with a reduction in blood oxygen saturation. Apneas can last 10 to over 60 seconds. It is caused by narrowing or collapse of the upper airway as muscles relax during sleep. Severity of sleep apnea is determined by frequency of breathing events and their effect on your sleep and oxygen levels determined during sleep testing.   2. What are the consequences of CRISTIANA? Symptoms include: daytime sleepiness- possibly increasing the risk of falling asleep while driving, unrefreshing/restless sleep, snoring, insomnia, waking frequently to urinate, waking with heartburn or reflux, reduced concentration and memory, and morning headaches. Other health consequences may include development of high blood pressure and other cardiovascular disease in persons who are susceptible. Untreated CRISTIANA  can contribute to heart disease, stroke and diabetes.   3. What are the treatment options? In most situations, sleep apnea is a lifelong disease that must be managed with daily therapy. " Medications are not effective for sleep apnea and surgery is generally not performed until other therapies have been tried. Therapy is usually tailored to the individual patient based on many factors including your wishes as well as severity of sleep apnea and severity of obesity. Continuous Positive Airway (CPAP) is the most reliable treatment. An oral device to hold your jaw forward is usually      IF I HAVE SLEEP APNEA.....  WHERE CAN I FIND MORE INFORMATION?    American Academy of Sleep Medicine Patient information on sleep disorders:  http://yoursleep.aasmnet.org    THINGS I SHOULD REMEMBER  In most situations, sleep apnea is a lifelong disease that must be managed with daily therapy. Untreated disease, when severe, may result in an increased risk for an array of problems from heart disease to mood changes, car accidents and shorter lifespan.    CPAP-  WHY AND HOW?                                    Continuous positive airway pressure, or CPAP, is the most effective treatment for obstructive sleep apnea. A decision to use CPAP is a major step forward in the pursuit of a healthier life. The successful use of CPAP will help you breathe easier, sleep better and live healthier. Using CPAP can be a positive experience if you keep these brown points in mind:  1. Commitment  CPAP is not a quick fix for your problem. It involves a long-term commitment to improve your sleep and your health.    2. Communication  Stay in close communication with both your sleep doctor and your CPAP supplier. Ask lots of questions and seek help when you need it.    3. Consistency  Use CPAP all night, every night and for every nap. You will receive the maximum health benefits from CPAP when you use it every time that you sleep. This will also make it easier for your body to adjust to the treatment.    4. Correction  The first machine and mask that you try may not be the best ones for you. Work with your sleep doctor and your CPAP supplier to  "make corrections to your equipment selection. Ask about trying a different type of machine or mask if you have ongoing problems. Make sure that your mask is a good fit and learn to use your equipment properly.    5. Challenge  Tell a family member or close friend to ask you each morning if you used your CPAP the previous night. Have someone to challenge you to give it your best effort.    6. Connection   Your adjustment to CPAP will be easier if you are able to connect with others who use the same treatment. Ask your sleep doctor if there is a support group in your area for people who have sleep apnea, or look for one on the Internet.    7. Comfort   Increase your level of comfort by using a saline spray, decongestant or heated humidifier if CPAP irritates your nose, mouth or throat. Use your unit's \"ramp\" setting to slowly get used to the air pressure level. There may be soft pads you can buy that will fit over your mask straps. Look on www.CPAP.com for accessories such as these straps, a pillow contoured for side-sleeping with CPAP, longer hoses, hose covers to reduce condensation, or stands to keep the hose out of your way.                                                              8. Cleaning   Clean your mask, tubing and headgear on a regular basis. Put this time in your schedule so that you don't forget to do it. Check and replace the filters for your CPAP unit and humidifier.    9. Completion   Although you are never finished with CPAP therapy, you should reward yourself by celebrating the completion of your first month of treatment. Expect this first month to be your hardest period of adjustment. It will involve some trial and error as you find the machine, mask and pressure settings that are right for you.    10. Continuation  After your first month of treatment, continue to make a daily commitment to use your CPAP all night, every night and for every nap.    CPAP-Tips to starting with success:  Begin " using your CPAP for short periods of time during the day while you watch TV or read.    Use CPAP every night and for every nap. Using it less often reduces the health benefits and makes it harder for your body to get used to it.    Newer CPAP models are virtually silent; however, if you find the sound of your CPAP machine to be bothersome, place the unit under your bed to dampen the sound.     Make small adjustments to your mask, tubing, straps and headgear until you get the right fit. Tightening the mask may actually worsen the leak.  If it leaves significant marks on your face or irritates the bridge of your nose, it may not be the best mask for you.  Speak with the person who supplied the mask and consider trying other masks.    Use a saline nasal spray to ease mild nasal congestion. Neti-Pot or saline nasal rinses may also help. Nasal gel sprays can help reduce nasal dryness.  Biotene mouthwash can be helpful to protect your teeth if you experience frequent dry mouth.  Dry mouth may be a sign of air escaping out of your mouth or out of the mask in the case of a full face mask.  Speak with your provider if you expect that is the case.     Take a nasal decongestant to relieve more severe nasal or sinus congestion.  Do not use Afrin (oxymetazoline) nasal spray more than 3 days in a row.  Speak with your sleep doctor if your nasal congestion is chronic.    Use a heated humidifier that fits your CPAP model to enhance your breathing comfort. Adjust the heat setting up if you get a dry nose or throat, down if you get condensation in the hose or mask.  Position the CPAP lower than you so that any condensation in the hose drains back into the machine rather than towards the mask.    Try a system that uses nasal pillows if traditional masks give you problems.    Clean your mask, tubing and headgear once a week. Make sure the equipment dries fully.    Regularly check and replace the filters for your CPAP unit and  "humidifier.    Work closely with your sleep doctor and your CPAP supplier to make sure that you have the machine, mask and air pressure setting that works best for you.        MASK DESENSITIZATION:    If you are experiencing some anxiety about trying a PAP mask or breathing with pressurized air try the following steps in sequence to get used to PAP. This is called \"desensitization\".    1.  Wear mask (disconnected from the device) for 60 minutes daily awake and use a distraction: Sit and watch TV, read, or listen to music with the mask on. Take the mask off and put it back on, as needed. This can be in a chair in the living room, for example.    2.  When you can use the mask without taking it off for 60 minutes and without anxiety, then proceed to step 3.    3.  Attach the mask to the PAP device, and switch the unit  on  and practice breathing through the mask for one hour while watching television, reading or performing some other sedentary, distracting activity.     4.  Once you are comfortable wearing PAP for 30-60 minutes without anxiety while distracted with an activity, try to use it in bed. You can continue distraction in bed by watching TV, reading, listening to music or an audio book, etc.  The main goal is to  not think about using PAP  but pay attention to relaxing.    5. Use the PAP during scheduled one-hour naps at home.    6. Use PAP during initial 3-4 hours of nocturnal sleep.    7. Use PAP through an entire night of sleep.      Your BMI is Body mass index is 43.33 kg/(m^2).  Weight management is a personal decision.  If you are interested in exploring weight loss strategies, the following discussion covers the approaches that may be successful. Body mass index (BMI) is one way to tell whether you are at a healthy weight, overweight, or obese. It measures your weight in relation to your height.  A BMI of 18.5 to 24.9 is in the healthy range. A person with a BMI of 25 to 29.9 is considered overweight, " and someone with a BMI of 30 or greater is considered obese. More than two-thirds of American adults are considered overweight or obese.  Being overweight or obese increases the risk for further weight gain. Excess weight may lead to heart disease and diabetes.  Creating and following plans for healthy eating and physical activity may help you improve your health.  Weight control is part of healthy lifestyle and includes exercise, emotional health, and healthy eating habits. Careful eating habits lifelong are the mainstay of weight control. Though there are significant health benefits from weight loss, long-term weight loss with diet alone may be very difficult to achieve- studies show long-term success with dietary management in less than 10% of people. Attaining a healthy weight may be especially difficult to achieve in those with severe obesity. In some cases, medications, devices and surgical management might be considered.  What can you do?  If you are overweight or obese and are interested in methods for weight loss, you should discuss this with your provider.     Consider reducing daily calorie intake by 500 calories.     Keep a food journal.     Avoiding skipping meals, consider cutting portions instead.    Diet combined with exercise helps maintain muscle while optimizing fat loss. Strength training is particularly important for building and maintaining muscle mass. Exercise helps reduce stress, increase energy, and improves fitness. Increasing exercise without diet control, however, may not burn enough calories to loose weight.       Start walking three days a week 10-20 minutes at a time    Work towards walking thirty minutes five days a week     Eventually, increase the speed of your walking for 1-2 minutes at time    In addition, we recommend that you review healthy lifestyles and methods for weight loss available through the National Institutes of Health patient information  sites:  http://win.niddk.nih.gov/publications/index.htm    And look into health and wellness programs that may be available through your health insurance provider, employer, local community center, or kanchan club.    Weight management plan: Patient was referred to their PCP to discuss a diet and exercise plan.     Your blood pressure was checked while you were in clinic today.  Please read the guidelines below about what these numbers mean and what you should do about them.  Your systolic blood pressure is the top number.  This is the pressure when the heart is pumping.  Your diastolic blood pressure is the bottom number.  This is the pressure in between beats.  If your systolic blood pressure is less than 120 and your diastolic blood pressure is less than 80, then your blood pressure is normal. There is nothing more that you need to do about it  If your systolic blood pressure is 120-139 or your diastolic blood pressure is 80-89, your blood pressure may be higher than it should be.  You should have your blood pressure re-checked within a year by a primary care provider.  If your systolic blood pressure is 140 or greater or your diastolic blood pressure is 90 or greater, you may have high blood pressure.  High blood pressure is treatable, but if left untreated over time it can put you at risk for heart attack, stroke, or kidney failure.  You should have your blood pressure re-checked by a primary care provider within the next four weeks.

## 2018-11-06 NOTE — PROGRESS NOTES
Received new O2 intake around 2:45pm. Patient does qualify for home oxygen with the sleep study, but in order to verify coverage it would be best if Dr. Olivo could add the specific qualifying parts of the sleep study to his face to face notes. Called the sleep lab and spoke with Lis, explained what we are looking for (specifics showing patient's AHI was 10 or less and the patient spent 5 or more min at 88% or below). She said she would pass it along, but he was in with a patient right now. I told her he could do them later today or tomorrow, as we are planning to speak to the patient tomorrow to set up an appointment for her to have a mask fitting, get set up with CPAP again and also oxygen She understood.

## 2018-11-07 NOTE — PROGRESS NOTES
11/7/18  Reviewed notes this morning and they say patient does not qualify under Medicare guidelines and I was confused, so reached out to the sleep lab. Spoke with Angela and walked through the sleep study and the Medicare guidelines together. She was going to review with Dr. Olivo and get back to me.   Angela called back to say they had the information we needed and Dr. Olivo has already made the necessary changes. I reviewed the notes and confirmed we are good to go and will work with  showroom staff to get her set up with new CPAP and oxygen. She understood.

## 2018-11-19 ENCOUNTER — TRANSFERRED RECORDS (OUTPATIENT)
Dept: HEALTH INFORMATION MANAGEMENT | Facility: CLINIC | Age: 78
End: 2018-11-19

## 2018-11-26 ENCOUNTER — DOCUMENTATION ONLY (OUTPATIENT)
Dept: SLEEP MEDICINE | Facility: CLINIC | Age: 78
End: 2018-11-26

## 2018-11-26 NOTE — PROGRESS NOTES
Patient was offered choice of vendor and chose [facility].  Patient Savanna Rehman was set up at [facility] on November 26, 2018. Patient received a Resmed AirSense 10 Auto. Pressures were set at 10-15 cm H2O.   Patient s ramp is 5 cm H2O for Auto and FLEX/EPR is EPR, 2.  Patient received a [employer] Mask name: DREAMWEAR  Full Face mask size Medium, heated tubing and heated humidifier.  Patient is enrolled in the STM Program and does need to meet compliance. Patient has a follow up on patient to schedule with Dr. Olivo.    REECE CORREIA

## 2018-11-26 NOTE — PROGRESS NOTES
3 DAY STM VISIT    Diagnostic AHI: 27.9 PSG    Patient contacted for 3 day STM visit  Subjective measures:  Pt called in because she restarted CPAP and had several questions.  She has questions about her oxygen tank making loud noises during the night and she isn't sure how to tell if she's actually getting 2 liters at night or not.  She also wants to know how she knows if it runs out.  I told her I would reach out to [facility] and have someone call her.  She also had several questions about her CPAP.  She used the FFM but found it really uncomfortable and went back to the nasal mask and thought that was more comfortable.  She's going to try that for awhile to see if that is ok and if she can keep her mouth closed when she wears it.  I walked her through how to change her tube temp as well.     Device type: Auto-CPAP  PAP settings from order::  CPAP min 10 cm  H20       CPAP max 15 cm  H20  Mask type:    Full Face Mask     Device settings from machine      Min CPAP 10.0            Max CPAP 15.0      Assessment: Nightly usage over four hours.  Action plan: Pt to have f/u 14 day [facility] visit.  Patient has a follow up visit scheduled:   no, but is required by insurance for compliance.

## 2018-11-29 ENCOUNTER — DOCUMENTATION ONLY (OUTPATIENT)
Dept: SLEEP MEDICINE | Facility: CLINIC | Age: 78
End: 2018-11-29

## 2018-11-29 NOTE — PROGRESS NOTES
"STM Recheck Visit     Subjective measures:   Pt called in because she didn't use her machine last night and is concerned about meeting compliance.  I went over how the compliance works.  I also talked to her about her anxiety around using the CPAP and \"doing it wrong.\"  I reassured her that she is doing well so far. She is motivated to meet compliance and has noticed benefit from wearing the CPAP.  Her O2 machine is still making a lot of noise and that is bothering her.  I encouraged her to speak wit [facility] to see what she could do about the noise of the machine. She will call if she has any questions.    Assessment: Pt meeting objective benchmarks.  Patient meeting subjective benchmarks.   Action plan: pt to have 14 day STM visit.    "

## 2018-12-05 ENCOUNTER — DOCUMENTATION ONLY (OUTPATIENT)
Dept: SLEEP MEDICINE | Facility: CLINIC | Age: 78
End: 2018-12-05

## 2018-12-05 NOTE — PROGRESS NOTES
STM recheck     Diagnostic AHI: 27.9   PSG    Subjective measures:   Pt calling in regarding her usage.  She is having some issues with nasal soreness with her nasal pillows.  She will try and get some nasal gel for comfort.     Assessment: Pt meeting objective benchmarks.  Patient failing following subjective benchmarks: mask discomfort.  She is also not liking the noise that the oxygen concentrator makes and will try moving it into the closet.   Action plan: pt to have 14 day [facility] visit.    Device type: Auto-CPAP  PAP settings: CPAP min 10.0 cm  H20     CPAP max 15.0 cm  H20       95th% pressure 13.9 cm   Mask type:  Full Face Mask  Objective measures: 14 day rolling measures      Compliance  64 %      Leak  25.33 lpm  last  upload      AHI 3.57   last  upload      Average number of minutes 190     Average hours of usage 3.2          Objective measure goal  Compliance   Goal >70%  Leak   Goal < 24 lpm  AHI  Goal < 5  Usage  Goal >240

## 2018-12-11 ENCOUNTER — DOCUMENTATION ONLY (OUTPATIENT)
Dept: SLEEP MEDICINE | Facility: CLINIC | Age: 78
End: 2018-12-11

## 2018-12-11 NOTE — PROGRESS NOTES
14 DAY STM VISIT    Diagnostic AHI:   PSG AHI: 27.9    Subjective measures:   Pt states things are going fine.  She wanted to know how much longer she needed to wear it every night to met compliance.  Explained the importance of wearing it nightly for her health benefits.     Assessment: Pt meeting objective benchmarks.  Patient meeting subjective benchmarks.   Action plan: pt to have 30 day STM visit.    Device type:   PAP Device: Auto-CPAP     PAP settings: CPAP min: 10.0cm  H20     CPAP max:15.0cm  H20    95th% pressure: 14cm H20  Mask type:    Mask Interface: Full Face Mask    Objective measures: 14 day rolling measures      Compliance: 71 %      Leak: 29lpm  last  upload      AHI: 4.21 last  upload      Average number of minutes: 220     Average hours of usage : 3.7        Objective measure goal  Compliance   Goal >70%  Leak   Goal < 24 lpm  AHI  Goal < 5  Usage  Goal >240

## 2018-12-14 ENCOUNTER — DOCUMENTATION ONLY (OUTPATIENT)
Dept: SLEEP MEDICINE | Facility: CLINIC | Age: 78
End: 2018-12-14

## 2018-12-14 NOTE — PROGRESS NOTES
14 DAY STM VISIT    Diagnostic AHI:   PSG AHI: 27.9     Subjective measures: Pt called in to review her usage.  She is still working on finding a good place for there oxygen concentrator to be during the night.         Device type:   PAP Device: Auto-CPAP ()     Mask type:    Mask Interface: Full Face Mask         Assessment: Pt meeting objective benchmarks.  She will need to use device 6/10 days to meet her insurance requirements.    Patient meeting subjective benchmarks.   Action plan: pt to have 30 day [facility] visit.  She will call after 12/24/2018 to once again review data from her device.               Objective measure goal  Compliance   Goal >70%  Leak   Goal < 24 lpm  AHI  Goal < 5  Usage  Goal >240

## 2018-12-17 ENCOUNTER — DOCUMENTATION ONLY (OUTPATIENT)
Dept: SLEEP MEDICINE | Facility: CLINIC | Age: 78
End: 2018-12-17

## 2018-12-17 NOTE — PROGRESS NOTES
[facility] Recheck Visit     Subjective measures:   Pt called in to see where she is at with compliance.  She is struggling with using the mask and O2.  She says she can't get the mask to fit properly.  She switched to a Dreamwear nasal mask that she had from she was previously on CPAP.  She said that she can't get the mask to seal and she really doesn't want to wear CPAP.  I suggested that she get fitted for a different nasal pillow mask.  She has a hard time with getting transportation to appointments and was wondering if someone could come to her house to do a mask fitting. I said I would reach out to [facility] to find out and let her know.  I also encouraged her to continue to use both her CPAP and her O2 nightly, as she hasn't met compliance yet.     Assessment: Pt not meeting objective benchmarks for compliance  Patient failing following subjective benchmarks: leak issues  and mask discomfort   Action plan: schedule mask fit appointment      Device type:  Auto-CPAP     PAP settings: CPAP min 10 cm  H20     CPAP max 15 cm  H20    95th% pressure: 13.6 cm  H20     Objective measures: 14 day rolling measures      Compliance: 57 %      Leak: 36 lpm  last  upload      AHI: 3.9 last  upload      Average number of minutes: 195      Diagnostic AHI:   PSG AHI: 27.9     Objective measure goal  Compliance   Goal >70%  Leak   Goal < 10%  AHI  Goal < 5  Usage  Goal >240

## 2018-12-17 NOTE — PROGRESS NOTES
[facility] received information from [facility] that the pt was in need of a mask change to pillows and was unable to come to the [facility] office for a fitting.  I called the pt and spoke with her at length about her CPAP hx since prior to 2011 and recent restarting of CPAP and compliance challenges she has faced.  We talked about the different masks available to her and she discovered she was a mouth breather and wouldn't be able to use the pillows.  We talked about the anatomy of the lungs and why its so important to use her CPAP and oxygen, that's when she told me she isn't using her oxygen.  She stated its too loud, she had her physical therapist put it in the closet because of the noise.  I reeducated her on oxygen and how it works and why it needs to by in an open area and if she is bothered by the noise to move it to another room.  I reviewed her download with her,  I told her I was very encouraged by it.  She started to tell me about the discussion she had with Sola and the days she didn't have to wear the CPAP.  I asked her why she was planning on not wearing it.  She said she hates it,  I shared some personal experiences with my CPAP and asked her to commit to wearing hers everyday and not committing to planned days off.  She seemed reinvested in her therapy at the end of our conversation and is staying with her current mask.  Dasha Busby RRT  [facility]

## 2018-12-19 ENCOUNTER — DOCUMENTATION ONLY (OUTPATIENT)
Dept: SLEEP MEDICINE | Facility: CLINIC | Age: 78
End: 2018-12-19

## 2018-12-19 NOTE — PROGRESS NOTES
Pt called in this morning very upset because she is trying really hard to use her CPAP and is really struggling with it.  She states that she tried to FFM mask again as that is what she was instructed to do by Dasha at [facility] on Monday.  She stated that she couldn't tolerate that and went back to her nasal mask and she is wearing it just 4 hours but she's not really sleeping. She is still struggling with the mask fitting and being comfortable and is really struggling with using it. She stated that she thinks something is wrong because she has been waking up with severe headaches and she has a really bad one today and she's not sure what she should do.  I asked her if she is using her oxygen at night with her CPAP and she states she has not used it for the last week and a half because it's all tangled up from when she moved it into her closet because it was so noisy and so she can't use it.  I told her she needs to use both her CPAP and her oxygen at night and that could be contributing to her headaches.  She states that she has a person coming in today to check on her and she will ask her to help get her oxygen setup again so she can use it with her CPAP.      She states she knows that she needs to use her CPAP and asked how close she is to meeting compliance.  I went over her data with her and re-iterated that she needs to use it even after she meets compliance.  She states that she knows that but she wants the mask issue fixed and she knows that cant get fixed right now so she has to get through the compliance period and then hopefully get her mask fixed. I told her that we will figure out a time to come help her with her mask issues.

## 2018-12-26 ENCOUNTER — DOCUMENTATION ONLY (OUTPATIENT)
Dept: SLEEP MEDICINE | Facility: CLINIC | Age: 78
End: 2018-12-26

## 2018-12-26 NOTE — PROGRESS NOTES
Pt called in and wanted to know if she met compliance.  She is using it and she does feel better.  She isn't getting up to go to the bathroom as much and her body doesn't ache as much. I reminded her to continue to use it because she is benefiting from it.

## 2018-12-28 ENCOUNTER — DOCUMENTATION ONLY (OUTPATIENT)
Dept: SLEEP MEDICINE | Facility: CLINIC | Age: 78
End: 2018-12-28

## 2018-12-28 NOTE — PROGRESS NOTES
30 DAY STM VISIT    Diagnostic AHI:   PSG AHI: 27.9      Data only recheck     Device type:  Auto-CPAP     Mask type: Full Face Mask        Assessment: Pt meeting objective benchmarks.         Action plan: pt to have 6 month [facility] visit  Patient has not scheduled a follow up visit with Dr. Olivo.           Objective measure goal  Compliance   Goal >70%  Leak   Goal < 24 lpm or 10% of the night in large leak   AHI  Goal < 5  Usage  Goal >240

## 2019-01-15 ENCOUNTER — DOCUMENTATION ONLY (OUTPATIENT)
Dept: SLEEP MEDICINE | Facility: CLINIC | Age: 79
End: 2019-01-15

## 2019-01-15 NOTE — PROGRESS NOTES
Patient returned call.     Subjective measures: Pt states that she has been sick so she hasn't been using her CPAP.  She is going to go back to using it. Patient failing following subjective benchmarks: congestion    Action plan:2 week STM recheck appt scheduled

## 2019-01-15 NOTE — PROGRESS NOTES
[facility] Recheck Visit     Message left for patient to return call     Assessment: Pt not meeting objective benchmarks for compliance     Action plan: waiting for patient to return call.   Patient does not have a follow up visit with Dr. Olivo scheduled.   Device type: Auto-CPAP  PAP settings: CPAP min 10 cm  H20     CPAP max 15 cm  H20  Objective measures: No use since 12/28/18    Diagnostic AHI: 27.9     Objective measure goal  Compliance   Goal >70%  Leak   Goal < 10%  AHI  Goal < 5  Usage  Goal >240

## 2019-01-28 ENCOUNTER — DOCUMENTATION ONLY (OUTPATIENT)
Dept: SLEEP MEDICINE | Facility: CLINIC | Age: 79
End: 2019-01-28

## 2019-02-12 ENCOUNTER — DOCUMENTATION ONLY (OUTPATIENT)
Dept: SLEEP MEDICINE | Facility: CLINIC | Age: 79
End: 2019-02-12

## 2019-02-12 NOTE — Clinical Note
[facility] DEACON manuel has a follow up visit with you on 2/20/2019 despite multiple [facility] visits patient still has not resumed using her device.  Let us know after her visit if you feel there is anything we can assist her with going forward.Olive

## 2019-02-12 NOTE — PROGRESS NOTES
STM recheck     Diagnostic AHI: 27.9   PSG    Data only recheck     Assessment: Pt not meeting objective benchmarks for compliance       Action plan: route to provider .      Device type: Auto-CPAP    PAP settings: CPAP min 10.0 cm  H20       CPAP max 15.0 cm  H20         Mask type:  Full Face Mask    Objective measures: 14 day rolling measures      Compliance  0 %           Objective measure goal  Compliance   Goal >70%  Leak   Goal < 24 lpm  AHI  Goal < 5  Usage  Goal >240

## 2019-04-01 ENCOUNTER — TRANSFERRED RECORDS (OUTPATIENT)
Dept: HEALTH INFORMATION MANAGEMENT | Facility: CLINIC | Age: 79
End: 2019-04-01

## 2019-04-29 LAB
ALT SERPL-CCNC: 43 U/L (ref 0–78)
AST SERPL-CCNC: 42 U/L (ref 0–37)
CREAT SERPL-MCNC: 1.23 MG/DL (ref 0.6–1.3)
GLUCOSE SERPL-MCNC: 224 MG/DL (ref 70–99)
POTASSIUM SERPL-SCNC: 4.6 MMOL/L (ref 3.5–5.1)

## 2019-05-22 ENCOUNTER — DOCUMENTATION ONLY (OUTPATIENT)
Dept: SLEEP MEDICINE | Facility: CLINIC | Age: 79
End: 2019-05-22

## 2019-05-22 NOTE — PROGRESS NOTES
Patient returned call.     Subjective measures: Pt states that she's had a lot of health issues and has moved into a Senior living facility so she hasn't used her CPAP since December.  She wants to start using it again. She had questions about how to start using it again. I walked her through it and she said she will start using again either tonight or tomorrow.         Action plan:2 week STM recheck appt scheduled

## 2019-05-22 NOTE — PROGRESS NOTES
6 month [facility] Recheck Visit     Diagnostic AHI: 27.9  PSG    Message left for patient to return call     Assessment: Pt not meeting objective benchmarks for compliance     Action plan: waiting for patient to return call.   pt to follow up per provider request       Device type: Auto-CPAP  PAP settings: CPAP min 10.0 cm  H20     CPAP max 15.0 cm  H20  Objective measures: No use since 12/28/18

## 2019-06-05 ENCOUNTER — DOCUMENTATION ONLY (OUTPATIENT)
Dept: SLEEP MEDICINE | Facility: CLINIC | Age: 79
End: 2019-06-05

## 2019-06-05 NOTE — PROGRESS NOTES
STM Recheck Visit     Subjective measures:   Pt still hasn't started using her CPAP.  She wants to but has a lot of barriers to using it.  She is going to get it set-up in the next few days and start using.  She will call and let us know if she has any issues.      Assessment: Pt not meeting objective benchmarks for compliance     Action plan: 2 week STM recheck appt scheduled  Patient has a follow up visit with Dr. Modi on 8/23/19.   Device type: Auto-CPAP  PAP settings: CPAP min 10 cm  H20     CPAP max 15 cm  H20  Objective measures:     Diagnostic AHI: 27.9     Objective measure goal  Compliance   Goal >70%  Leak   Goal < 10%  AHI  Goal < 5  Usage  Goal >240

## 2019-06-26 ENCOUNTER — DOCUMENTATION ONLY (OUTPATIENT)
Dept: SLEEP MEDICINE | Facility: CLINIC | Age: 79
End: 2019-06-26

## 2019-06-26 NOTE — PROGRESS NOTES
[facility] Recheck Visit     Data only recheck     Assessment: Pt not meeting objective benchmarks for compliance. Pt still hasn't started using  Action plan: Patient has a follow up visit with Dr. Modi on 8/23/19.   Device type: Auto-CPAP  PAP settings: CPAP min 10 cm  H20     CPAP max 15 cm  H20  Objective measures: No usage since 12/28/18

## 2019-07-02 ENCOUNTER — TRANSFERRED RECORDS (OUTPATIENT)
Dept: HEALTH INFORMATION MANAGEMENT | Facility: CLINIC | Age: 79
End: 2019-07-02

## 2019-07-02 LAB
ALT SERPL-CCNC: 36 U/L (ref 0–78)
AST SERPL-CCNC: 39 U/L (ref 0–37)
CHOLEST SERPL-MCNC: 148 MG/DL (ref 0–200)
CREAT SERPL-MCNC: 1.35 MG/DL (ref 0.6–1.3)
GLUCOSE SERPL-MCNC: 126 MG/DL (ref 70–99)
HBA1C MFR BLD: 6.8 % (ref 4.8–5.6)
HDLC SERPL-MCNC: 33 MG/DL (ref 40–96)
LDLC SERPL CALC-MCNC: 90.4 MG/DL (ref 0–130)
POTASSIUM SERPL-SCNC: 4.8 MMOL/L (ref 3.5–5.1)
TRIGL SERPL-MCNC: 123 MG/DL (ref 30–200)

## 2019-07-10 ENCOUNTER — TELEPHONE (OUTPATIENT)
Dept: UROLOGY | Facility: CLINIC | Age: 79
End: 2019-07-10

## 2019-07-10 NOTE — TELEPHONE ENCOUNTER
[facility]    Phone Message    May a detailed message be left on voicemail: yes    Reason for Call: Other: Pt was seen here long ago and doesn't remember the name of the Doctor, but would like to speak with the Doctors assistant who's name is probably Azra.  Does Azra work there and if so can she please contact the Pt.  Pt did not want to talk to me about scheduling with another doctor or speaking to another nurse but I'm sure will want some final follow up call if Azra cannot be found. thx    Action Taken: Message routed to:  [facility] ([facility] urology sudhir

## 2019-07-10 NOTE — TELEPHONE ENCOUNTER
Called and spoke to patient . She needs a work up for incontinenece. Informed her she should see DR Wilson.Roya Nicole LPN

## 2019-07-25 ENCOUNTER — DOCUMENTATION ONLY (OUTPATIENT)
Dept: SLEEP MEDICINE | Facility: CLINIC | Age: 79
End: 2019-07-25

## 2019-07-25 NOTE — PROGRESS NOTES
[facility] Recheck Visit     Message left for patient to return call     Assessment: Pt not meeting objective benchmarks for compliance     Action plan: waiting for patient to return call.     Device type: Auto-CPAP  PAP settings: CPAP min 10 cm  H20     CPAP max 15 cm  H20   Objective measures: No usage since 12/18/19    Diagnostic AHI: 27.9     Objective measure goal  Compliance   Goal >70%  Leak   Goal < 10%  AHI  Goal < 5  Usage  Goal >240

## 2019-08-13 ENCOUNTER — OFFICE VISIT (OUTPATIENT)
Dept: UROLOGY | Facility: CLINIC | Age: 79
End: 2019-08-13
Payer: COMMERCIAL

## 2019-08-13 VITALS — BODY MASS INDEX: 42.44 KG/M2 | HEART RATE: 68 BPM | OXYGEN SATURATION: 97 % | WEIGHT: 280 LBS | HEIGHT: 68 IN

## 2019-08-13 DIAGNOSIS — N32.81 URGENCY-FREQUENCY SYNDROME: ICD-10-CM

## 2019-08-13 DIAGNOSIS — N39.0 RECURRENT UTI: Primary | ICD-10-CM

## 2019-08-13 DIAGNOSIS — B37.2 CANDIDIASIS OF SKIN: ICD-10-CM

## 2019-08-13 DIAGNOSIS — N39.41 URGENCY INCONTINENCE: ICD-10-CM

## 2019-08-13 LAB — RESIDUAL VOLUME (RV) (EXTERNAL): 63

## 2019-08-13 PROCEDURE — 99214 OFFICE O/P EST MOD 30 MIN: CPT | Performed by: UROLOGY

## 2019-08-13 RX ORDER — NYSTATIN 10B UNIT
POWDER (EA) MISCELLANEOUS
Qty: 1 EACH | Refills: 1 | Status: SHIPPED | OUTPATIENT
Start: 2019-08-13 | End: 2019-10-18

## 2019-08-13 ASSESSMENT — PAIN SCALES - GENERAL: PAINLEVEL: NO PAIN (0)

## 2019-08-13 ASSESSMENT — MIFFLIN-ST. JEOR: SCORE: 1808.57

## 2019-08-13 NOTE — NURSING NOTE
Pt states she leaks all the time and wears a diaper.  Pt unable to void.... She just went before she left her house.  pvr by scanner=63mL  Pt has a rash (yeast) infection on her leg she would like to talk about.  No UA at this time.  Pt denies dysuria.  Pt states she leaks on the way to the rest room.  Pt drinks caffeine free diet coke.   A cocktail every 2 weeks and h2o.  Pt is diabetic.  ASAD Monaco CMA

## 2019-08-13 NOTE — PATIENT INSTRUCTIONS
Please do the CT scan    Please do a 3 day bladder diary    Use the nystatin powder to the red area    Other barrier ointments that you can use are the zinc oxide, calmoseptine or criticaid clear    Please return for a cystoscopy (procedure to look in the bladder) and pelvic exam    It was a pleasure meeting with you today.  Thank you for allowing me and my team the privilege of caring for you today.  YOU are the reason we are here, and I truly hope we provided you with the excellent service you deserve.  Please let us know if there is anything else we can do for you so that we can be sure you are leaving completely satisfied with your care experience.    Cystoscopy    Cystoscopy is a procedure that lets your doctor look directly inside your urethra and bladder. It can be used to:    Help diagnose a problem with your urethra, bladder, or kidneys.    Take a sample (biopsy) of bladder or urethral tissue.    Treat certain problems (such as removing kidney stones).    Place a stent to bypass an obstruction.    Take special X-rays of the kidneys.  Based on the findings, your doctor may recommend other tests or treatments.  What is a cystoscope?  A cystoscope is a telescope-like instrument that contains lenses and fiberoptics (small glass wires that make bright light). The cystoscope may be straight and rigid, or flexible to bend around curves in the urethra. The doctor may look directly into the cystoscope, or project the image onto a monitor.  Getting ready    Ask your doctor if you should stop taking any medicines before the procedure.    Follow any other instructions your doctor gives you.  Tell your doctor before the exam if you:    Take any medicines, such as aspirin or blood thinners    Have allergies to any medicines    Are pregnant   The procedure  Cystoscopy is done in the doctor s office, surgery center, or hospital. The doctor and a nurse are present during the procedure. It takes only a few minutes, longer if  a biopsy, X-ray, or treatment needs to be done.  During the procedure:    You lie on an exam table on your back, knees bent and legs apart. You are covered with a drape.    Your urethra and the area around it are washed. Anesthetic jelly may be applied to numb the urethra.    The cystoscope is inserted. A sterile fluid is put into the bladder to expand it. You may feel pressure from this fluid.    When the procedure is done, the cystoscope is removed.  After the procedure   Once you re home:    Drink plenty of fluids.    You may have burning or light bleeding when you urinate--this is normal.    Medicines may be prescribed to ease any discomfort or prevent infection. Take these as directed.    Call your doctor if you have heavy bleeding or blood clots, burning that lasts more than a day, a fever over 100 F  (38  C), or trouble urinating.  Date Last Reviewed: 1/1/2017 2000-2017 [employer]. [address] [place] [address], [place] [address]. All rights reserved. This information is not intended as a substitute for professional medical care. Always follow your healthcare professional's instructions.

## 2019-08-13 NOTE — PROGRESS NOTES
"August 13, 2019    Return visit    Patient returns today for follow up.  She initially saw Azra Morrisagustín in 12/2016 for Omar.  At thiat time she reported 1-2 UTI per month but there were no cultures available for review.  Patient also with urinary urgency and trialed both oxybutynin and mirabegron.  On the oxybutynin usually takes it once a day but it isn't helping.  She notes that in the past 2 months she notes that the urinary leakage is much more and she is now wearing diapers and having issues with her skin.  She denies any febrile UTIs, kidney infections or hospitalization for UTI.  She states that when she does get her UTI she has dysuria, pain and some \"trembling.\"    Last urology note is a telephone encounter from 1/2107.  Noted patient and a CHF exacerbation and possible MI at the end of 12/2016 and also at that time reported urinary urgency frequency and a \"feeling of a bowling ball dropping from her abdomen when she stands up.\"    Records received from [facility]-reports that all of the samples were taken at home and then brought into the office  4/2018 E coli   5/2018 E coli  4/2019 E col    Pulse 68   Ht 1.727 m (5' 8\")   Wt 127 kg (280 lb)   SpO2 97%   BMI 42.57 kg/m    She is comfortable, in no distress, non-labored breathing. She has some redness in her right groin consistent with fungal infection    Urine dip patient unable to leave a sample today    PVR 63 mL by bladder scan    A/P: 78 year old F with history of UTI, urinary urgency frequency, severe urgency incontinence which seems to have acutely gotten worse    CT scan to evaluate for stones    Given the infections and symptoms recommend cystoscopy as the next step.  Would like a CATHETERIZED urine 7-10 days prior to the cystoscopy    Nystatin powder to the groin and if not improved should see her PCP    3 day bladder diary    25 minutes were spent with the patient today, > 50% in counseling and coordination of " care    Deisy Wilson MD MPH   of Urology    CC  Patient Care Team:  Hilary Finney MD as PCP - General (Family Practice)

## 2019-08-13 NOTE — LETTER
"8/13/2019       RE: Savanna Rehman  [address]  [id number] [address]  [facility] [address]     Dear Colleague,    Thank you for referring your patient, Savanna Rehman, to the [facility] [place] at [facility]. Please see a copy of my visit note below.    August 13, 2019    Return visit    Patient returns today for follow up.  She initially saw Azra Mendoza in 12/2016 for Omar.  At thiat time she reported 1-2 UTI per month but there were no cultures available for review.  Patient also with urinary urgency and trialed both oxybutynin and mirabegron.  On the oxybutynin usually takes it once a day but it isn't helping.  She notes that in the past 2 months she notes that the urinary leakage is much more and she is now wearing diapers and having issues with her skin.  She denies any febrile UTIs, kidney infections or hospitalization for UTI.  She states that when she does get her UTI she has dysuria, pain and some \"trembling.\"    Last urology note is a telephone encounter from 1/2107.  Noted patient and a CHF exacerbation and possible MI at the end of 12/2016 and also at that time reported urinary urgency frequency and a \"feeling of a bowling ball dropping from her abdomen when she stands up.\"    Records received from [facility]-reports that all of the samples were taken at home and then brought into the office  4/2018 E coli   5/2018 E coli  4/2019 E col    Pulse 68   Ht 1.727 m (5' 8\")   Wt 127 kg (280 lb)   SpO2 97%   BMI 42.57 kg/m     She is comfortable, in no distress, non-labored breathing. She has some redness in her right groin consistent with fungal infection    Urine dip patient unable to leave a sample today    PVR 63 mL by bladder scan    A/P: 78 year old F with history of UTI, urinary urgency frequency, severe urgency incontinence which seems to have acutely gotten worse    CT scan to evaluate for " stones    Given the infections and symptoms recommend cystoscopy as the next step.  Would like a CATHETERIZED urine 7-10 days prior to the cystoscopy    Nystatin powder to the groin and if not improved should see her PCP    3 day bladder diary    25 minutes were spent with the patient today, > 50% in counseling and coordination of care    Deisy Wilson MD MPH   of Urology    CC  Patient Care Team:  Hilary Finney MD as PCP - General (Family Practice)

## 2019-09-03 ENCOUNTER — TELEPHONE (OUTPATIENT)
Dept: UROLOGY | Facility: CLINIC | Age: 79
End: 2019-09-03

## 2019-09-03 NOTE — TELEPHONE ENCOUNTER
Pt unable to make it in before her cysto appt with Dr. Wilson on 9-11-19.  Pt will do a cath UA day of cysto.  I explained to her if she has a UTI the cysto will not be done that day.  Pt understood.  Pt is confused on all that is going on.... I did my best to help her figure it all out.  Pt will have CT and then come in for the cysto.  ASAD Monaco CMA

## 2019-09-03 NOTE — TELEPHONE ENCOUNTER
[facility] Call Center    Phone Message    May a detailed message be left on voicemail: yes    Reason for Call: Other: pt is cancelling her appt with Dr Carpenter because she cannot walk from her CT appt to  her appt with Dr Carpenter - pt said if they want her to do a clean catch at her home she can do that to bring in, if it has to be a catheter appt then she needs to be scheduled at [place] on 9/11 when she will be there for her cysto, please call to discuss options    Action Taken: Message routed to:  [facility] Monica

## 2019-09-03 NOTE — TELEPHONE ENCOUNTER
[facility] Call Center    Phone Message    May a detailed message be left on voicemail: yes    Reason for Call: Other: Pt would like a call back to speak with a nurse - Pt would not provide any info what she wants to speak about.     Action Taken: Message routed to:  [facility] urology

## 2019-09-12 ENCOUNTER — TELEPHONE (OUTPATIENT)
Dept: SURGERY | Facility: CLINIC | Age: 79
End: 2019-09-12

## 2019-09-12 NOTE — TELEPHONE ENCOUNTER
Patient last seen 2015, is having similar symptoms as before her hiatal hernia repair with LEL in 2015. Has general questions  I advised her that it would be hard to give advice if she has not been seen in the past year and she should come in.  She states she has trouble coming in and insists I ask a nurse to call her     Phone: [phone]    Message ok

## 2019-09-23 ENCOUNTER — TRANSFERRED RECORDS (OUTPATIENT)
Dept: HEALTH INFORMATION MANAGEMENT | Facility: CLINIC | Age: 79
End: 2019-09-23

## 2019-09-23 NOTE — TELEPHONE ENCOUNTER
Patient called demanding to speak to a nurse that she knows.  She got very irritated that she was only able to speak with myself or Dayana.    She stated that we are very rude here and it is not ok that we make it impossible to speak with LEL.    She wanted the PC, I am assuming that is Jefferson Wolff to know that she saw her PCP today, and would like PC to call her to discuss.    Patient can be reached at:  [phone]  Ok to leave VM

## 2019-09-24 ENCOUNTER — TELEPHONE (OUTPATIENT)
Dept: UROLOGY | Facility: CLINIC | Age: 79
End: 2019-09-24

## 2019-09-24 NOTE — TELEPHONE ENCOUNTER
"Urology pt of Dr. Wilson last seen 8/13/2019 and scheduled for cysto 10/8.   Savanna is scheduled to have a cathed urine spec done on 9/27 in advance of the cysto. She calls today stating \"I'm begging you\" to be able to do do a clean catch spec rather than the catheterization. I discussed the reasoning re: the cath spec but she states the procedure is \"very painful\" for her.  Will forward this request to provider. In Basket message sent.  "

## 2019-09-25 ENCOUNTER — TRANSFERRED RECORDS (OUTPATIENT)
Dept: HEALTH INFORMATION MANAGEMENT | Facility: CLINIC | Age: 79
End: 2019-09-25

## 2019-10-04 ENCOUNTER — TELEPHONE (OUTPATIENT)
Dept: SURGERY | Facility: CLINIC | Age: 79
End: 2019-10-04

## 2019-10-04 ENCOUNTER — HOSPITAL ENCOUNTER (OUTPATIENT)
Dept: CT IMAGING | Facility: CLINIC | Age: 79
Discharge: HOME | End: 2019-10-04
Attending: UROLOGY | Admitting: UROLOGY
Payer: COMMERCIAL

## 2019-10-04 DIAGNOSIS — R35.0 URINARY FREQUENCY: Primary | ICD-10-CM

## 2019-10-04 DIAGNOSIS — N39.0 RECURRENT UTI: ICD-10-CM

## 2019-10-04 DIAGNOSIS — N32.81 URGENCY-FREQUENCY SYNDROME: ICD-10-CM

## 2019-10-04 DIAGNOSIS — N39.41 URGENCY INCONTINENCE: ICD-10-CM

## 2019-10-04 LAB
ALBUMIN UR-MCNC: NEGATIVE MG/DL
APPEARANCE UR: CLEAR
BILIRUB UR QL STRIP: NEGATIVE
COLOR UR AUTO: YELLOW
GLUCOSE UR STRIP-MCNC: NEGATIVE MG/DL
HGB UR QL STRIP: ABNORMAL
KETONES UR STRIP-MCNC: NEGATIVE MG/DL
LEUKOCYTE ESTERASE UR QL STRIP: ABNORMAL
NITRATE UR QL: POSITIVE
PH UR STRIP: 6 PH (ref 5–7)
SOURCE: ABNORMAL
SP GR UR STRIP: 1.02 (ref 1–1.03)
UROBILINOGEN UR STRIP-ACNC: 4 EU/DL (ref 0.2–1)

## 2019-10-04 PROCEDURE — 81003 URINALYSIS AUTO W/O SCOPE: CPT | Mod: QW | Performed by: UROLOGY

## 2019-10-04 PROCEDURE — 74176 CT ABD & PELVIS W/O CONTRAST: CPT

## 2019-10-04 PROCEDURE — 87186 SC STD MICRODIL/AGAR DIL: CPT | Performed by: UROLOGY

## 2019-10-04 PROCEDURE — 87086 URINE CULTURE/COLONY COUNT: CPT | Performed by: UROLOGY

## 2019-10-04 PROCEDURE — 87088 URINE BACTERIA CULTURE: CPT | Performed by: UROLOGY

## 2019-10-04 NOTE — TELEPHONE ENCOUNTER
Name of caller: Patient    Reason for Call:  Call back, patient would like Jefferson to vandana her back to discuss some issues.Patient prefers to only speak with Jefferson or Dr. Ansari!  She did not want to leave additional information with me.    Surgeon:  Dr. Ansari    Recent Surgery: NO    If yes, when & what type:  N/A      Best phone number to reach pt at is: [phone]    Ok to leave a message with medical info? Yes.

## 2019-10-07 ENCOUNTER — TELEPHONE (OUTPATIENT)
Dept: UROLOGY | Facility: CLINIC | Age: 79
End: 2019-10-07

## 2019-10-07 DIAGNOSIS — N39.0 URINARY TRACT INFECTION: Primary | ICD-10-CM

## 2019-10-07 LAB
BACTERIA SPEC CULT: ABNORMAL
Lab: ABNORMAL
SPECIMEN SOURCE: ABNORMAL

## 2019-10-07 RX ORDER — NITROFURANTOIN 25; 75 MG/1; MG/1
100 CAPSULE ORAL 2 TIMES DAILY
Qty: 10 CAPSULE | Refills: 0 | Status: SHIPPED | OUTPATIENT
Start: 2019-10-07 | End: 2019-10-18

## 2019-10-07 NOTE — TELEPHONE ENCOUNTER
Called patient and informed her that she has a UTI. We will need to reschedule her cystoscopy appointment and begin her on Macrobid x 5 days per protocol .Roya Nicole LPN

## 2019-10-10 ENCOUNTER — TELEPHONE (OUTPATIENT)
Dept: SURGERY | Facility: CLINIC | Age: 79
End: 2019-10-10

## 2019-10-10 ENCOUNTER — NURSE TRIAGE (OUTPATIENT)
Dept: NURSING | Facility: CLINIC | Age: 79
End: 2019-10-10

## 2019-10-10 NOTE — TELEPHONE ENCOUNTER
Telephone Encounter    Date: 6:21 PM; 10/10/2019  Patient Name: Savanna Rehman  MRN: [id number]    Savanna Rehman is 78 year old year old female with a history of Omar who was recently diagnosed with UTI. She is currently in the middle of her course of Macrobid. Notes she still feels off intermittently. Temps as high as 99F. Feels great in morning then feels less well during the afternoon. Having bladder discomfort. Curious if she should finish her course of antibiotics (Macrobid).     Both strains of E. Coli from recent culture susceptible to Macrobid.    Plans:  - Patient was encouraged to finish her course of abx  - She may call back if still having symptoms after she finished her course  - She has follow up with PCP scheduled for Monday - encouraged to attend/discuss with them if she is continuing to have symptoms as well  - Patient advised that because this is an encounter performed over the telephone, I am not able to perform a physical exam and thus any advice given is limited in nature and may not be completely informed.  The patient accepts this risk and if they have concerns with it they should be seen and evaluated in person by a medical professional.   - Discussed strict return precautions, including but not limited to: fevers > 101.4, chills, an inability to keep food or fluids down, increasing hematuria, and an inability to urinate.  - Patient expressed understanding and was in agreement with plan.    --    Olu Newberry MD  Urology Resident

## 2019-10-10 NOTE — TELEPHONE ENCOUNTER
"Patient seen on 10/4/2019> [facility]> UA> CT scan.  She is diabetic and has hx of afib., obesity and CP at rest, heart failure.    S: She was to see Dr Wilson  for cystoscopy but found to have UTI.  She was started on Macrobid on 10/7/2019  nitroFURantoin macrocrystal-monohydrate (MACROBID) 100 MG capsule x 5 days  She has 4 pills left  -By Tuesday night 10/8  she was twice as sick with rigors and chills.  -Wednesday she was slightly better than Tuesday, by PM feeling better  -Today she awoke feeling not too great, had grapes and cottage cheese and did take her pill.  -She is also \"doctoring for vomiting ', ( no emesis for 5 days)  This afternoon she began feeling worse and worse with low grade abdominal achiness ( not present previously) and c/o soreness tongue and roof of mouth.  Her norm temp is 96.8, today is 98,99, which she states is a fever for her.  O:  Culture Micro Abnormal  10/04/2019 11:37    >100,000 colonies/mL   Escherichia coli       A: She would like to both repor( N/V, abd discomfort, low grade fever, soreness tongue and roof of mouth) on Macrobid and change medications  and change medications.      P: She does not have transport and refuses Ed unless she feels far worse. We reviewed if rapid escalation in sx, chest pain, SOB, rigors or abdominal pain worsens, hematemeis or fever>100.  Page sent to on-call Urology to discuss potential change in medication or other advice and message to  clinical pool.  CT :  IMPRESSION:  1. No urinary tract calculi or hydronephrosis. Kidneys, ureters and  bladder are unremarkable.  2. Diverticulosis without evidence of diverticulitis.  3. Lobulated liver contour. No evidence of fatty infiltration. Prior  cholecystectomy.  4. Postop changes from hysterectomy and Nissen fundoplication    Reason for Disposition    Age > 60 years    Additional Information    Negative: Shock suspected (e.g., cold/pale/clammy skin, too weak to stand, low BP, rapid " "pulse)    Negative: Difficult to awaken or acting confused (e.g., disoriented, slurred speech)    Negative: Passed out (i.e., lost consciousness, collapsed and was not responding)    Negative: Sounds like a life-threatening emergency to the triager    Negative: Chest pain    Negative: Pain is mainly in upper abdomen  (if needed ask: \"is it mainly above the belly button?\")    Negative: Followed an abdomen (stomach) injury    Negative: [1] Abdominal pain AND [2] pregnant < 20 weeks    Negative: [1] Abdominal pain AND [2] pregnant > 20 weeks    Negative: [1] Abdominal pain AND [2] postpartum < 1 month since delivery    Negative: [1] MILD-MODERATE pain AND [2] constant AND [3] present > 2 hours    Negative: [1] Vomiting AND [2] abdomen looks much more swollen than usual    Negative: [1] Vomiting AND [2] contains bile (green color)    Negative: White of the eyes have turned yellow (i.e., jaundice)    Negative: Fever > 103 F (39.4 C)    Negative: [1] Fever > 101 F (38.3 C) AND [2] age > 60    Negative: [1] Fever > 100.0 F (37.8 C) AND [2] bedridden (e.g., nursing home patient, CVA, chronic illness, recovering from surgery)    Negative: [1] Fever > 100.0 F (37.8 C) AND [2] diabetes mellitus or weak immune system (e.g., HIV positive, cancer chemo, splenectomy, chronic steroids)    Negative: Shock suspected (e.g., cold/pale/clammy skin, too weak to stand, low BP, rapid pulse)    Negative: Sounds like a life-threatening emergency to the triager    Protocols used: ABDOMINAL PAIN - FEMALE-A-AH, URINARY SYMPTOMS-A-AH      "

## 2019-10-18 ENCOUNTER — OFFICE VISIT (OUTPATIENT)
Dept: INTERNAL MEDICINE | Facility: CLINIC | Age: 79
End: 2019-10-18
Payer: COMMERCIAL

## 2019-10-18 VITALS
HEART RATE: 77 BPM | OXYGEN SATURATION: 95 % | BODY MASS INDEX: 40.72 KG/M2 | DIASTOLIC BLOOD PRESSURE: 70 MMHG | RESPIRATION RATE: 18 BRPM | HEIGHT: 68 IN | SYSTOLIC BLOOD PRESSURE: 98 MMHG | TEMPERATURE: 97.4 F | WEIGHT: 268.7 LBS

## 2019-10-18 DIAGNOSIS — E56.9 VITAMIN DEFICIENCY: ICD-10-CM

## 2019-10-18 DIAGNOSIS — Z12.31 VISIT FOR SCREENING MAMMOGRAM: ICD-10-CM

## 2019-10-18 DIAGNOSIS — E78.5 HYPERLIPIDEMIA LDL GOAL <100: ICD-10-CM

## 2019-10-18 DIAGNOSIS — R42 LIGHTHEADEDNESS: ICD-10-CM

## 2019-10-18 DIAGNOSIS — E55.9 VITAMIN D DEFICIENCY: ICD-10-CM

## 2019-10-18 DIAGNOSIS — M54.50 CHRONIC MIDLINE LOW BACK PAIN WITHOUT SCIATICA: ICD-10-CM

## 2019-10-18 DIAGNOSIS — R63.4 WEIGHT LOSS: ICD-10-CM

## 2019-10-18 DIAGNOSIS — G89.29 CHRONIC MIDLINE LOW BACK PAIN WITHOUT SCIATICA: ICD-10-CM

## 2019-10-18 DIAGNOSIS — R30.0 DYSURIA: ICD-10-CM

## 2019-10-18 DIAGNOSIS — Z86.2 HISTORY OF ANEMIA: ICD-10-CM

## 2019-10-18 DIAGNOSIS — I95.9 HYPOTENSION, UNSPECIFIED HYPOTENSION TYPE: ICD-10-CM

## 2019-10-18 DIAGNOSIS — I48.20 CHRONIC ATRIAL FIBRILLATION (H): ICD-10-CM

## 2019-10-18 DIAGNOSIS — E11.42 DIABETIC POLYNEUROPATHY ASSOCIATED WITH TYPE 2 DIABETES MELLITUS (H): Primary | ICD-10-CM

## 2019-10-18 PROCEDURE — 99204 OFFICE O/P NEW MOD 45 MIN: CPT | Performed by: INTERNAL MEDICINE

## 2019-10-18 RX ORDER — OXYBUTYNIN CHLORIDE 5 MG/1
5 TABLET ORAL AT BEDTIME
COMMUNITY
Start: 2019-10-18 | End: 2020-03-03

## 2019-10-18 ASSESSMENT — ENCOUNTER SYMPTOMS
HEARTBURN: 0
CHILLS: 1
DYSURIA: 0
MYALGIAS: 1
COUGH: 1
HEMATURIA: 0
WEAKNESS: 1
SHORTNESS OF BREATH: 1
CONSTIPATION: 0
ARTHRALGIAS: 0
NERVOUS/ANXIOUS: 0
PARESTHESIAS: 0
PALPITATIONS: 0
DIZZINESS: 1
SORE THROAT: 0
EYE PAIN: 1
FEVER: 1
JOINT SWELLING: 0
DIARRHEA: 0
HEMATOCHEZIA: 0
NAUSEA: 1
HEADACHES: 1
ABDOMINAL PAIN: 1
FREQUENCY: 0
BREAST MASS: 0

## 2019-10-18 ASSESSMENT — MIFFLIN-ST. JEOR: SCORE: 1757.32

## 2019-10-18 ASSESSMENT — ACTIVITIES OF DAILY LIVING (ADL)
CURRENT_FUNCTION: LAUNDRY REQUIRES ASSISTANCE
CURRENT_FUNCTION: TRANSPORTATION REQUIRES ASSISTANCE
CURRENT_FUNCTION: HOUSEWORK REQUIRES ASSISTANCE
CURRENT_FUNCTION: SHOPPING REQUIRES ASSISTANCE

## 2019-10-18 NOTE — NURSING NOTE
"BP 98/70 (BP Location: Left arm, Patient Position: Sitting, Cuff Size: Adult Regular)   Pulse 77   Temp 97.4  F (36.3  C) (Oral)   Resp 18   Ht 1.727 m (5' 8\")   Wt 121.9 kg (268 lb 11.2 oz)   LMP  (LMP Unknown)   SpO2 95%   Breastfeeding? No   BMI 40.86 kg/m    Areli Rubio CMA    "

## 2019-10-18 NOTE — PROGRESS NOTES
Dr Lauren's note      Patient's instructions / PLAN:                                                        Plan:  1. Urine test - you can bring the sample to the lab tomorrow   2. Please make a lab appointment for fasting labs    3. No changes in meds   4. Follow up in 3-4 weeks   5. Salty food for few days      ASSESSMENT & PLAN:                                                      Mrs Rehman is 79 y/o woman with complex multiple problems.  We will start the work-up for her problems and hopefully by her next appointment we have the old records      (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  (H)  (primary encounter diagnosis)  Comment: Not sure if controlled or not  Plan: CBC with platelets, Comprehensive metabolic         panel, Lipid panel reflex to direct LDL         Fasting, Albumin Random Urine Quantitative with        Creat Ratio, TSH with free T4 reflex, Vitamin         B12, Hemoglobin A1c            (I48.20) Chronic atrial fibrillation  Comment: Rate controlled  Plan: INR CLINIC REFERRAL            (E78.5) Hyperlipidemia LDL goal <100  Comment:   Plan: CBC with platelets, Comprehensive metabolic         panel, Lipid panel reflex to direct LDL         Fasting, Albumin Random Urine Quantitative with        Creat Ratio, TSH with free T4 reflex, Vitamin         B12, Hemoglobin A1c, Vitamin D Deficiency,         Vitamin B1 whole blood, Ferritin, Folate, Iron         and iron binding capacity, Vitamin B6            (R42) Lightheadedness  (I95.9) Hypotension, unspecified hypotension type  Comment:   Plan: CBC with platelets, Comprehensive metabolic         panel, Lipid panel reflex to direct LDL         Fasting, Albumin Random Urine Quantitative with        Creat Ratio, TSH with free T4 reflex, Vitamin         B12, Hemoglobin A1c, Vitamin D Deficiency,         Vitamin B1 whole blood, Ferritin, Folate, Iron         and iron binding capacity, Vitamin B6, Cortisol        as above     (R30.0) Dysuria  Comment:   Plan: *UA  reflex to Microscopic and Culture ([facility] (except [place] and         [place]), CBC with platelets, Comprehensive         metabolic panel, Lipid panel reflex to direct         LDL Fasting, Albumin Random Urine Quantitative         with Creat Ratio, TSH with free T4 reflex,         Vitamin B12, Hemoglobin A1c, Vitamin D         Deficiency, Vitamin B1 whole blood, Ferritin,         Folate, Iron and iron binding capacity, Vitamin        B6, CANCELED: *UA reflex to Microscopic and         Culture ([facility] (except         [place] and [place])            (E56.9) Vitamin deficiency  Comment:   Plan: CBC with platelets, Comprehensive metabolic         panel, Lipid panel reflex to direct LDL         Fasting, Albumin Random Urine Quantitative with        Creat Ratio, TSH with free T4 reflex, Vitamin         B12, Hemoglobin A1c, Vitamin D Deficiency,         Vitamin B1 whole blood, Ferritin, Folate, Iron         and iron binding capacity, Vitamin B6            (Z86.2) History of anemia  Comment:   Plan: CBC with platelets, Comprehensive metabolic         panel, Lipid panel reflex to direct LDL         Fasting, Albumin Random Urine Quantitative with        Creat Ratio, TSH with free T4 reflex, Vitamin         B12, Hemoglobin A1c, Vitamin D Deficiency,         Vitamin B1 whole blood, Ferritin, Folate, Iron         and iron binding capacity, Vitamin B6            (E55.9) Vitamin D deficiency  Comment:   Plan: Vitamin D Deficiency            (M54.5,  G89.29) Chronic midline low back pain without sciatica  Comment: Better  Plan:     (R63.4) Weight loss  Comment: No unintentional  Plan: We will reviewed the blood test and continue appropriate work-up    (Z12.31) Visit for screening mammogram  Comment:   Plan: *MA Screening Digital Bilateral,                Chief complaint:                                                      Follow up chronic medical problems        SUBJECTIVE:  "    Mrs Corrales has so many things to discuss with me she doesn't know where to start. She is jumping from an idea to another trying to give me as many info as she can.     She signed a consent form last week to have the records released from the prior clinic. She is frustrated we don't have them.     She agreed to try to focus on 2 issues but again she had too much to say     Urine symptoms  -- Urine incontinence   -- evaluated by urology dr Wilson for hematuria and incontinence  --  Pt has been wearing adult diapers since February  -- \" I have a crazy bladder: I Can urinate at home, I cannot urinate in public bathrooms\"    Weight loss  -- 20 lbs in 3 weeks according to her home scale  -- She denies poor diet.  She feels dehydrated  -- No cough, no blood in stools      LBP   -- chr but better in the last couple of days    Lightheadedness  --Especially with standing    Atrial fibrillation  -- She has [facility] RN who does her INR  --She does not feel palpitation  --We will make a referral to INR clinic      Hyperlipidemia  --Pt stopped taking Pravastatin medication, because she thought it caused lightheadedness    Knee  --Past left knee replacement about 15 years ago   --Pt uses walker due to fear of falling     Peripheral neuropathy and diabetes.   No recent labs available    Low Back Pain  --Back pain has decreased in last two days  Hx of vomiting:  *Frequent vomiting, her previous doctor would not refer her for an endoscopy like she wants (reports that her stomach was behind her heart and she had a surgery to pull it down and tie it to her esophagus).    Skin:   *Sees dermatology, had small area of cancer removed recently.     Are you in the first 12 months of your Medicare coverage?  No      Review of Systems:                                                        Constitutional: Positive for chills and fever.   HENT: Positive for ear pain. Negative for congestion, hearing loss and sore throat.    Eyes: Positive for pain " "and visual disturbance.   Respiratory: Positive for cough and shortness of breath.    Cardiovascular: Positive for peripheral edema. Negative for chest pain and palpitations.   Gastrointestinal: Positive for abdominal pain and nausea. Negative for constipation, diarrhea, heartburn and hematochezia.   Breasts:  Negative for tenderness, breast mass and discharge.   Genitourinary: Negative for dysuria, frequency, genital sores, hematuria, pelvic pain, urgency, vaginal bleeding and vaginal discharge.   Musculoskeletal: Positive for myalgias. Negative for arthralgias and joint swelling.   Skin: Negative for rash.   Neurological: Positive for dizziness, weakness and headaches. Negative for paresthesias.   Psychiatric/Behavioral: Negative for mood changes. The patient is not nervous/anxious.        OBJECTIVE:             Physical exam:  Blood pressure 98/70, pulse 77, temperature 97.4  F (36.3  C), temperature source Oral, resp. rate 18, height 1.727 m (5' 8\"), weight 121.9 kg (268 lb 11.2 oz), SpO2 95 %, not currently breastfeeding.   NAD, appears comfortable  Skin: no rashes   HEENT: PERRLA, EOMI, pink conjunctiva, anicteric sclerae, bilateral tympanic membranes are clinically normal, oropharynx is normal color  Neck: supple, no JVD,  No thyroidmegaly. Lymph nodes nonpalpable cervical and supraclavicular.  Chest: clear to auscultation bilaterally, good respiratory effort  Heart: S1 S2, RRR, no mgr appreciated  Abdomen: soft, not tender, no hepatosplenomegaly or masses appreciated, no abdominal bruit, present bowel sounds  Extremities: no edema,   Neurologic: A, Ox3, no focal signs appreciated  Walks slowly with a walker    PMHx: reviewed  Past Medical History:   Diagnosis Date     Anemia     Iron Deficiency anemia     Atrial fibrillation (H)      CAD (coronary artery disease)     non-obstructive     Chronic pain     neck, low back, legs     Congestive heart failure (H)      Degenerative disk disease      Fibromyalgia  "     Gastro-oesophageal reflux disease      Gout      Hiatal hernia      Mumps      Neuropathy      Palpitations      Pernicious anemia      Sleep apnea     uses CPAP.     Spider veins      Urinary incontinence      Vitamin D deficiency       PSHx: reviewed  Past Surgical History:   Procedure Laterality Date     APPENDECTOMY       CHOLECYSTECTOMY       COLONOSCOPY  3/15/2011     CORONARY ANGIOGRAPHY ADULT ORDER       HEART CATH LEFT HEART CATH  18    medication management     HYSTERECTOMY TOTAL ABDOMINAL       Knee replacement NOS Left      LAPAROSCOPIC NISSEN FUNDOPLICATION N/A 2015    Procedure: LAPAROSCOPIC NISSEN FUNDOPLICATION;  Surgeon: Armando Ansari MD;  Location: [facility] OR     TONSILLECTOMY       TRANSPOSITION ULNAR NERVE (ELBOW)          Meds: reviewed  Current Outpatient Medications   Medication Sig Dispense Refill     ACETAMINOPHEN PO Take 500 mg by mouth       glipiZIDE (GLUCOTROL XL) 2.5 MG 24 hr tablet Take 2.5 mg by mouth daily  1     Ibuprofen (ADVIL PO) Take 400 mg by mouth every 8 hours as needed for moderate pain       LORazepam (ATIVAN) 0.5 MG tablet Take 1 tablet (0.5 mg) by mouth every 4 hours as needed for anxiety or other (chest pain) (Patient taking differently: Take 0.5 mg by mouth as needed for anxiety or other (chest pain) ) 12 tablet 0     oxybutynin (DITROPAN) 5 MG tablet Take 1 tablet (5 mg) by mouth daily Clarified as immediate release with [facility]./Patient       WARFARIN SODIUM PO Take 2.5 mg by mouth Take on Sun Mon, Wed, Fri       WARFARIN SODIUM PO Take 5 mg by mouth daily Tues, , Sat       order for DME Oxygen 2 Li/min  at night bleed with CPAP       pravastatin (PRAVACHOL) 20 MG tablet Take 20 mg by mouth At Bedtime   1       Social History     Tobacco Use     Smoking status: Former Smoker     Packs/day: 0.00     Types: Cigarettes     Last attempt to quit: 2014     Years since quittin.1     Smokeless tobacco: Never Used   Substance Use Topics     Alcohol  "use: Yes     Comment: socially     Drug use: Never      Family History   Problem Relation Age of Onset     Alzheimer Disease Mother      Lung Cancer Father      No Known Problems Brother      No Known Problems Brother      No Known Problems Brother      Unknown/Adopted No family hx of             Patti Lauren MD  Internal Medicine           Healthy Habits:     In general, how would you rate your overall health?  Good    Frequency of exercise:  None    Do you usually eat at least 4 servings of fruit and vegetables a day, include whole grains    & fiber and avoid regularly eating high fat or \"junk\" foods?  No    Taking medications regularly:  Yes    Medication side effects:  Lightheadedness    Ability to successfully perform activities of daily living:  Transportation requires assistance, shopping requires assistance, housework requires assistance and laundry requires assistance    Home Safety:  No safety concerns identified    Hearing Impairment:  No hearing concerns    In the past 6 months, have you been bothered by leaking of urine? Yes    In general, how would you rate your overall mental or emotional health?  Good      PHQ-2 Total Score: 1    Additional concerns today:  No      Review of Systems   Constitutional: Positive for chills and fever.   HENT: Positive for ear pain. Negative for congestion, hearing loss and sore throat.    Eyes: Positive for pain and visual disturbance.   Respiratory: Positive for cough and shortness of breath.    Cardiovascular: Positive for peripheral edema. Negative for chest pain and palpitations.   Gastrointestinal: Positive for abdominal pain and nausea. Negative for constipation, diarrhea, heartburn and hematochezia.   Breasts:  Negative for tenderness, breast mass and discharge.   Genitourinary: Negative for dysuria, frequency, genital sores, hematuria, pelvic pain, urgency, vaginal bleeding and vaginal discharge.   Musculoskeletal: Positive for myalgias. Negative for " arthralgias and joint swelling.   Skin: Negative for rash.   Neurological: Positive for dizziness, weakness and headaches. Negative for paresthesias.   Psychiatric/Behavioral: Negative for mood changes. The patient is not nervous/anxious.

## 2019-10-18 NOTE — PATIENT INSTRUCTIONS
Plan:  1. Urine test - you can bring the sample to the lab tomorrow   2. Please make a lab appointment for fasting labs    3. No changes in meds   4. Follow up in 3-4 weeks   5. Salty food for few days

## 2019-10-21 ENCOUNTER — TELEPHONE (OUTPATIENT)
Dept: INTERNAL MEDICINE | Facility: CLINIC | Age: 79
End: 2019-10-21

## 2019-10-21 NOTE — TELEPHONE ENCOUNTER
Parker Munoz,    She is a new patient to me. She has visiting nurse who does INR.   I made referral for our INR clinic    Thanks,    Patti Lauren MD  Internal Medicine

## 2019-10-22 PROBLEM — E11.42 DIABETIC POLYNEUROPATHY ASSOCIATED WITH TYPE 2 DIABETES MELLITUS (H): Status: ACTIVE | Noted: 2019-10-22

## 2019-10-23 ENCOUNTER — TELEPHONE (OUTPATIENT)
Dept: FAMILY MEDICINE | Facility: CLINIC | Age: 79
End: 2019-10-23

## 2019-10-23 DIAGNOSIS — I48.21 PERMANENT ATRIAL FIBRILLATION (H): ICD-10-CM

## 2019-10-23 DIAGNOSIS — I48.20 CHRONIC ATRIAL FIBRILLATION (H): Primary | ICD-10-CM

## 2019-10-23 DIAGNOSIS — I48.20 CHRONIC ATRIAL FIBRILLATION (H): ICD-10-CM

## 2019-10-23 DIAGNOSIS — Z79.01 LONG TERM (CURRENT) USE OF ANTICOAGULANTS: ICD-10-CM

## 2019-10-23 LAB — INR PPP: 2.4 (ref 0.8–1.1)

## 2019-10-23 NOTE — TELEPHONE ENCOUNTER
ANTICOAGULATION MANAGEMENT     Patient Name:  Savanna Rehman  Date:  10/23/2019    ASSESSMENT /SUBJECTIVE:      Today's INR result of 2.4 is therapeutic. Goal INR of 2.0-3.0      Warfarin dose taken: Less warfarin taken than instructed which may be affecting INR    Diet: No new diet changes affecting INR    Medication changes/ interactions: No new medications/supplements affecting INR    Previous INR: Therapeutic     S/S of bleeding or thromboembolism: No    New injury or illness:  No    Upcoming surgery, procedure or cardioversion:  No    Additional findings: Patient Transferred care to Dr. Lauren.  Home care Nurse Raj states patient has been on Coumadin for some time and the dose listed.  INR clinic referral placed by new PCP. Episode setup and [facility] To manage while with Home Care.      PLAN:    Spoke with Raj Home Care Nurse regarding INR result and instructed:     Warfarin Dosing Instructions: Patient missed dose on Monday; Boost dose will be taken tomorrow, then resume normal dosing of 3.75 mg MWF, and 2.5 mg all other days.    Instructed patient to follow up no later than: 2 weeks; Home Care  Education provided: No      Raj Nurse,  verbalizes understanding and agrees to warfarin dosing plan.    Instructed to call the Anticoagulation Clinic for any changes, questions or concerns. (#[phone])        OBJECTIVE:  INR   Date Value Ref Range Status   10/23/2019 2.4 (A) 0.8 - 1.1 Final             Anticoagulation Summary  As of 10/23/2019    INR goal:   2.0-3.0   TTR:   --   INR used for dosin.4 (10/23/2019)   Warfarin maintenance plan:   3.75 mg (2.5 mg x 1.5) every Mon, Wed, Fri; 2.5 mg (2.5 mg x 1) all other days   Full warfarin instructions:   10/24: 3.75 mg; Otherwise 3.75 mg every Mon, Wed, Fri; 2.5 mg all other days   Weekly warfarin total:   21.25 mg   Plan last modified:   Azul Whitaker RN (10/23/2019)   Next INR check:   2019   Priority:   INR   Target end date:    Indefinite    Indications    Permanent atrial fibrillation [I48.21]  Chronic atrial fibrillation [I48.20]  Long term (current) use of anticoagulants [Z79.01]             Anticoagulation Episode Summary     INR check location:       Preferred lab:   EXTERNAL LAB    Send INR reminders to:   NIKKI CHASE    Comments:   Home care       Anticoagulation Care Providers     Provider Role Specialty Phone number    Patti Suárez MD [facility] Internal Medicine [phone]

## 2019-10-23 NOTE — TELEPHONE ENCOUNTER
"Requested Prescriptions   Pending Prescriptions Disp Refills     warfarin ANTICOAGULANT (COUMADIN) 2.5 MG tablet  Last Written Prescription Date:  n/a  Last Fill Quantity: n/a,  # refills: n/a   Last office visit: 10/18/2019 with prescribing provider:  Leonidas   Future Office Visit:   Next 5 appointments (look out 90 days)    Nov 12, 2019  3:40 PM CST  SHORT with Patti Suárez MD  [facility] ([facility]) [address]  [facility] [id number] [phone]                Sig: Take 1 tablet (2.5 mg) by mouth Take on Sun Mon, Wed, Fri       Vitamin K Antagonists Failed - 10/23/2019 12:42 PM        Failed - INR is within goal in the past 6 weeks     Confirm INR is within goal in the past 6 weeks.     Recent Labs   Lab Test 01/14/18  1200   INR 1.94*                       Failed - Medication is active on med list        Passed - Recent (12 mo) or future (30 days) visit within the authorizing provider's specialty     Patient has had an office visit with the authorizing provider or a provider within the authorizing providers department within the previous 12 mos or has a future within next 30 days. See \"Patient Info\" tab in inbasket, or \"Choose Columns\" in Meds & Orders section of the refill encounter.              Passed - Patient is 18 years of age or older        Passed - Patient is not pregnant        Passed - No positive pregnancy on file in past 12 months        warfarin ANTICOAGULANT (COUMADIN) 5 MG tablet       Sig: Take 1 tablet (5 mg) by mouth daily Tues, Thus, Sat       Vitamin K Antagonists Failed - 10/23/2019 12:42 PM        Failed - INR is within goal in the past 6 weeks     Confirm INR is within goal in the past 6 weeks.     Recent Labs   Lab Test 01/14/18  1200   INR 1.94*                       Failed - Medication is active on med list        Passed - Recent (12 mo) or future (30 days) visit within the authorizing provider's specialty     Patient " "has had an office visit with the authorizing provider or a provider within the authorizing providers department within the previous 12 mos or has a future within next 30 days. See \"Patient Info\" tab in inbasket, or \"Choose Columns\" in Meds & Orders section of the refill encounter.              Passed - Patient is 18 years of age or older        Passed - Patient is not pregnant        Passed - No positive pregnancy on file in past 12 months          "

## 2019-10-24 RX ORDER — WARFARIN SODIUM 2.5 MG/1
TABLET ORAL
Qty: 30 TABLET | Refills: 0 | Status: SHIPPED | OUTPATIENT
Start: 2019-10-24 | End: 2019-11-06

## 2019-10-24 RX ORDER — WARFARIN SODIUM 5 MG/1
5 TABLET ORAL DAILY
Qty: 30 TABLET | Refills: 0 | Status: SHIPPED | OUTPATIENT
Start: 2019-10-24 | End: 2019-11-06 | Stop reason: DRUGHIGH

## 2019-10-24 NOTE — TELEPHONE ENCOUNTER
Routing refill request to provider for review/approval because:  Drug not active on patient's medication list  Labs out of range:  INR elevated        Patient had INR yesterday and was 2.4.  Please advise, thanks.    Last office visit was 10/18/19

## 2019-11-06 ENCOUNTER — TELEPHONE (OUTPATIENT)
Dept: INTERNAL MEDICINE | Facility: CLINIC | Age: 79
End: 2019-11-06

## 2019-11-06 DIAGNOSIS — I48.20 CHRONIC ATRIAL FIBRILLATION (H): ICD-10-CM

## 2019-11-06 DIAGNOSIS — I48.21 PERMANENT ATRIAL FIBRILLATION (H): ICD-10-CM

## 2019-11-06 DIAGNOSIS — Z79.01 LONG TERM (CURRENT) USE OF ANTICOAGULANTS: ICD-10-CM

## 2019-11-06 LAB — INR PPP: 2.8

## 2019-11-06 RX ORDER — WARFARIN SODIUM 2.5 MG/1
TABLET ORAL
Qty: 126 TABLET | Refills: 0 | COMMUNITY
Start: 2019-11-06 | End: 2020-02-13

## 2019-11-06 NOTE — TELEPHONE ENCOUNTER
ANTICOAGULATION MANAGEMENT     Patient Name:  Savanna Rehman  Date:  2019    ASSESSMENT /SUBJECTIVE:      Today's INR result of 2.8 is therapeutic. Goal INR of 2.0-3.0      Warfarin dose taken: Warfarin taken as previously instructed    Diet: No new diet changes affecting INR    Medication changes/ interactions: No new medications/supplements affecting INR    Previous INR: Therapeutic     S/S of bleeding or thromboembolism: No    New injury or illness:  No    Upcoming surgery, procedure or cardioversion:  Yes: Cystoscopy    Additional findings: none      PLAN:    Spoke with Raj HORTON regarding INR result and instructed:     Warfarin Dosing Instructions: Continue your current warfarin dose    Instructed patient to follow up no later than: 2 weeks    Education provided: Terri Waters RN verbalizes understanding and agrees to warfarin dosing plan.    Instructed to call the Anticoagulation Clinic for any changes, questions or concerns. (#[phone])        OBJECTIVE:  INR   Date Value Ref Range Status   2019 2.8  Final             Anticoagulation Summary  As of 2019    INR goal:   2.0-3.0   TTR:   100.0 % (4 d)   INR used for dosin.8 (2019)   Warfarin maintenance plan:   3.75 mg (2.5 mg x 1.5) every Mon, Wed, Fri; 2.5 mg (2.5 mg x 1) all other days   Full warfarin instructions:   3.75 mg every Mon, Wed, Fri; 2.5 mg all other days   Weekly warfarin total:   21.25 mg   Plan last modified:   Azul Whitaker RN (10/23/2019)   Next INR check:      Priority:   INR   Target end date:   Indefinite    Indications    Permanent atrial fibrillation [I48.21]  Chronic atrial fibrillation [I48.20]  Long term (current) use of anticoagulants [Z79.01]             Anticoagulation Episode Summary     INR check location:       Preferred lab:   EXTERNAL LAB    Send INR reminders to:   NIKKI CHASE    Comments:   Home care       Anticoagulation Care Providers     Provider Role Specialty Phone number     aPtti Suárez MD [facility] Internal Medicine [phone]

## 2019-11-08 NOTE — TELEPHONE ENCOUNTER
Home care nurse calling to update on new INR check date of 11/19.  Patient unable to be seen on 11/20.  Azul Whitaker RN  Anticoagulation Nurse - [facility], [place]

## 2019-11-12 ENCOUNTER — TELEPHONE (OUTPATIENT)
Dept: INTERNAL MEDICINE | Facility: CLINIC | Age: 79
End: 2019-11-12

## 2019-11-16 DIAGNOSIS — E55.9 VITAMIN D DEFICIENCY: ICD-10-CM

## 2019-11-16 DIAGNOSIS — E11.42 DIABETIC POLYNEUROPATHY ASSOCIATED WITH TYPE 2 DIABETES MELLITUS (H): ICD-10-CM

## 2019-11-16 DIAGNOSIS — R30.0 DYSURIA: ICD-10-CM

## 2019-11-16 DIAGNOSIS — Z86.2 HISTORY OF ANEMIA: ICD-10-CM

## 2019-11-16 DIAGNOSIS — E78.5 HYPERLIPIDEMIA LDL GOAL <100: ICD-10-CM

## 2019-11-16 DIAGNOSIS — R42 LIGHTHEADEDNESS: ICD-10-CM

## 2019-11-16 DIAGNOSIS — R82.90 NONSPECIFIC FINDING ON EXAMINATION OF URINE: Primary | ICD-10-CM

## 2019-11-16 DIAGNOSIS — E56.9 VITAMIN DEFICIENCY: ICD-10-CM

## 2019-11-16 DIAGNOSIS — I95.9 HYPOTENSION, UNSPECIFIED HYPOTENSION TYPE: ICD-10-CM

## 2019-11-16 DIAGNOSIS — Z12.31 VISIT FOR SCREENING MAMMOGRAM: ICD-10-CM

## 2019-11-16 LAB
ALBUMIN SERPL-MCNC: 3.8 G/DL (ref 3.4–5)
ALBUMIN UR-MCNC: NEGATIVE MG/DL
ALP SERPL-CCNC: 89 U/L (ref 40–150)
ALT SERPL W P-5'-P-CCNC: 32 U/L (ref 0–50)
ANION GAP SERPL CALCULATED.3IONS-SCNC: 9 MMOL/L (ref 3–14)
APPEARANCE UR: CLEAR
AST SERPL W P-5'-P-CCNC: 39 U/L (ref 0–45)
BACTERIA #/AREA URNS HPF: ABNORMAL /HPF
BILIRUB SERPL-MCNC: 0.8 MG/DL (ref 0.2–1.3)
BILIRUB UR QL STRIP: NEGATIVE
BUN SERPL-MCNC: 20 MG/DL (ref 7–30)
CALCIUM SERPL-MCNC: 9.3 MG/DL (ref 8.5–10.1)
CHLORIDE SERPL-SCNC: 102 MMOL/L (ref 94–109)
CHOLEST SERPL-MCNC: 170 MG/DL
CO2 SERPL-SCNC: 23 MMOL/L (ref 20–32)
COLOR UR AUTO: YELLOW
CORTIS SERPL-MCNC: 13.6 UG/DL (ref 4–22)
CREAT SERPL-MCNC: 0.95 MG/DL (ref 0.52–1.04)
CREAT UR-MCNC: 114 MG/DL
ERYTHROCYTE [DISTWIDTH] IN BLOOD BY AUTOMATED COUNT: 14.3 % (ref 10–15)
FERRITIN SERPL-MCNC: 115 NG/ML (ref 8–252)
FOLATE SERPL-MCNC: 7.8 NG/ML
GFR SERPL CREATININE-BSD FRML MDRD: 58 ML/MIN/{1.73_M2}
GLUCOSE SERPL-MCNC: 130 MG/DL (ref 70–99)
GLUCOSE UR STRIP-MCNC: NEGATIVE MG/DL
HBA1C MFR BLD: 6.4 % (ref 0–5.6)
HCT VFR BLD AUTO: 45.4 % (ref 35–47)
HDLC SERPL-MCNC: 33 MG/DL
HGB BLD-MCNC: 14.9 G/DL (ref 11.7–15.7)
HGB UR QL STRIP: ABNORMAL
IRON SATN MFR SERPL: 32 % (ref 15–46)
IRON SERPL-MCNC: 106 UG/DL (ref 35–180)
KETONES UR STRIP-MCNC: NEGATIVE MG/DL
LDLC SERPL CALC-MCNC: 113 MG/DL
LEUKOCYTE ESTERASE UR QL STRIP: ABNORMAL
MCH RBC QN AUTO: 30.2 PG (ref 26.5–33)
MCHC RBC AUTO-ENTMCNC: 32.8 G/DL (ref 31.5–36.5)
MCV RBC AUTO: 92 FL (ref 78–100)
MICROALBUMIN UR-MCNC: 17 MG/L
MICROALBUMIN/CREAT UR: 14.56 MG/G CR (ref 0–25)
NITRATE UR QL: POSITIVE
NONHDLC SERPL-MCNC: 137 MG/DL
PH UR STRIP: 5 PH (ref 5–7)
PLATELET # BLD AUTO: 227 10E9/L (ref 150–450)
POTASSIUM SERPL-SCNC: 4.3 MMOL/L (ref 3.4–5.3)
PROT SERPL-MCNC: 8.3 G/DL (ref 6.8–8.8)
RBC # BLD AUTO: 4.94 10E12/L (ref 3.8–5.2)
RBC #/AREA URNS AUTO: ABNORMAL /HPF
SODIUM SERPL-SCNC: 134 MMOL/L (ref 133–144)
SOURCE: ABNORMAL
SP GR UR STRIP: 1.02 (ref 1–1.03)
TIBC SERPL-MCNC: 333 UG/DL (ref 240–430)
TRIGL SERPL-MCNC: 119 MG/DL
TSH SERPL DL<=0.005 MIU/L-ACNC: 2.45 MU/L (ref 0.4–4)
UROBILINOGEN UR STRIP-ACNC: 1 EU/DL (ref 0.2–1)
VIT B12 SERPL-MCNC: 519 PG/ML (ref 193–986)
WBC # BLD AUTO: 6.7 10E9/L (ref 4–11)
WBC #/AREA URNS AUTO: ABNORMAL /HPF

## 2019-11-16 PROCEDURE — 84425 ASSAY OF VITAMIN B-1: CPT | Mod: 90 | Performed by: INTERNAL MEDICINE

## 2019-11-16 PROCEDURE — 82728 ASSAY OF FERRITIN: CPT | Performed by: INTERNAL MEDICINE

## 2019-11-16 PROCEDURE — 84443 ASSAY THYROID STIM HORMONE: CPT | Performed by: INTERNAL MEDICINE

## 2019-11-16 PROCEDURE — 87088 URINE BACTERIA CULTURE: CPT | Performed by: INTERNAL MEDICINE

## 2019-11-16 PROCEDURE — 80061 LIPID PANEL: CPT | Performed by: INTERNAL MEDICINE

## 2019-11-16 PROCEDURE — 84207 ASSAY OF VITAMIN B-6: CPT | Mod: 90 | Performed by: INTERNAL MEDICINE

## 2019-11-16 PROCEDURE — 83036 HEMOGLOBIN GLYCOSYLATED A1C: CPT | Performed by: INTERNAL MEDICINE

## 2019-11-16 PROCEDURE — 99000 SPECIMEN HANDLING OFFICE-LAB: CPT | Performed by: INTERNAL MEDICINE

## 2019-11-16 PROCEDURE — 82533 TOTAL CORTISOL: CPT | Performed by: INTERNAL MEDICINE

## 2019-11-16 PROCEDURE — 87186 SC STD MICRODIL/AGAR DIL: CPT | Performed by: INTERNAL MEDICINE

## 2019-11-16 PROCEDURE — 82607 VITAMIN B-12: CPT | Performed by: INTERNAL MEDICINE

## 2019-11-16 PROCEDURE — 36415 COLL VENOUS BLD VENIPUNCTURE: CPT | Performed by: INTERNAL MEDICINE

## 2019-11-16 PROCEDURE — 82043 UR ALBUMIN QUANTITATIVE: CPT | Performed by: INTERNAL MEDICINE

## 2019-11-16 PROCEDURE — 83540 ASSAY OF IRON: CPT | Performed by: INTERNAL MEDICINE

## 2019-11-16 PROCEDURE — 81001 URINALYSIS AUTO W/SCOPE: CPT | Performed by: INTERNAL MEDICINE

## 2019-11-16 PROCEDURE — 82746 ASSAY OF FOLIC ACID SERUM: CPT | Performed by: INTERNAL MEDICINE

## 2019-11-16 PROCEDURE — 87086 URINE CULTURE/COLONY COUNT: CPT | Performed by: INTERNAL MEDICINE

## 2019-11-16 PROCEDURE — 85027 COMPLETE CBC AUTOMATED: CPT | Performed by: INTERNAL MEDICINE

## 2019-11-16 PROCEDURE — 83550 IRON BINDING TEST: CPT | Performed by: INTERNAL MEDICINE

## 2019-11-16 PROCEDURE — 80053 COMPREHEN METABOLIC PANEL: CPT | Performed by: INTERNAL MEDICINE

## 2019-11-16 PROCEDURE — 82306 VITAMIN D 25 HYDROXY: CPT | Performed by: INTERNAL MEDICINE

## 2019-11-16 NOTE — LETTER
[facility]  [address], [address]  [place] [address]  [phone]        November 23, 2019    Savanna Rehman  [facility] ([place] [place]   [address]  [facility] [address]            Dear Ms. Savanna Rehman:    These are the recent blood test results.  Your vitamin B6 and vitamin the are low.  The rest of the results are in acceptable range.    We will discuss about this results on your next appointment December 10.    Sincerely,    Patti Lauren MD  Internal Medicine

## 2019-11-18 ENCOUNTER — TELEPHONE (OUTPATIENT)
Dept: INTERNAL MEDICINE | Facility: CLINIC | Age: 79
End: 2019-11-18

## 2019-11-18 DIAGNOSIS — N30.01 ACUTE CYSTITIS WITH HEMATURIA: ICD-10-CM

## 2019-11-18 DIAGNOSIS — N39.0 URINARY TRACT INFECTION: Primary | ICD-10-CM

## 2019-11-18 LAB
DEPRECATED CALCIDIOL+CALCIFEROL SERPL-MC: 10 UG/L (ref 20–75)
VIT B6 SERPL-MCNC: 15.9 NMOL/L (ref 20–125)

## 2019-11-18 RX ORDER — SULFAMETHOXAZOLE/TRIMETHOPRIM 800-160 MG
1 TABLET ORAL 2 TIMES DAILY
Qty: 10 TABLET | Refills: 0 | Status: SHIPPED | OUTPATIENT
Start: 2019-11-18 | End: 2019-12-10

## 2019-11-18 NOTE — TELEPHONE ENCOUNTER
Patient calls requesting lab results today.  Patient is extremely upset and is certain she has a bladder infection and needs a call back as soon as possible.

## 2019-11-18 NOTE — TELEPHONE ENCOUNTER
Spoke with Savanna and let her know we will watch her INR, and INR tomorrow will reflect any infection present, since she is unable to get her antibiotic until tomorrow AM, and will only have 1 dose in by HC visit at 1:30 11/19/2019.    Jayleen Oconnor, PharmD BCACP  Anticoagulation Clinical Pharmacist

## 2019-11-18 NOTE — TELEPHONE ENCOUNTER
Patient has appt with urology for cysto this Wed. 11/20/19 and states they will not do cystoscopy if she has infection.  Dr. Lauren approved Bactrim DS twice daily x5 days.  Rx sent to [facility]  Sensitivities still pending.  BENITA Graham R.N.

## 2019-11-18 NOTE — TELEPHONE ENCOUNTER
Patient scheduled for INR 11/19/2019 with home care.    Left VM for CLIFFORD Anthony home care  to alert her, and asked for call back only if unable to INR as scheduled tomorrow.    Jayleen Oconnor, PharmD [facility] [place]  Anticoagulation Clinical Pharmacist

## 2019-11-19 ENCOUNTER — TELEPHONE (OUTPATIENT)
Dept: INTERNAL MEDICINE | Facility: CLINIC | Age: 79
End: 2019-11-19

## 2019-11-19 DIAGNOSIS — I48.21 PERMANENT ATRIAL FIBRILLATION (H): ICD-10-CM

## 2019-11-19 DIAGNOSIS — Z79.01 LONG TERM (CURRENT) USE OF ANTICOAGULANTS: ICD-10-CM

## 2019-11-19 DIAGNOSIS — I48.20 CHRONIC ATRIAL FIBRILLATION (H): ICD-10-CM

## 2019-11-19 LAB
BACTERIA SPEC CULT: ABNORMAL
INR PPP: 2.2 (ref 0.8–1.1)
SPECIMEN SOURCE: ABNORMAL

## 2019-11-19 RX ORDER — NITROFURANTOIN 25; 75 MG/1; MG/1
100 CAPSULE ORAL 2 TIMES DAILY
Qty: 14 CAPSULE | Refills: 0 | Status: SHIPPED | OUTPATIENT
Start: 2019-11-19 | End: 2019-12-10

## 2019-11-19 NOTE — TELEPHONE ENCOUNTER
ANTICOAGULATION MANAGEMENT     Patient Name:  Savanna Rehman  Date:  2019    ASSESSMENT /SUBJECTIVE:      Today's INR result of 2.2 is therapeutic. Goal INR of 2.0-3.0      Warfarin dose taken: Missed dose(s) may be affecting INR Pt missed her warfarin dose on     Diet: No new diet changes affecting INR    Medication changes/ interactions: No interaction expected between Macrobid and warfarin    Previous INR: Therapeutic     S/S of bleeding or thromboembolism: No    New injury or illness:  Yes: Bladder infection; will be starting Macrobid 19    Upcoming surgery, procedure or cardioversion:  Yes: Pt to have cystoscopy after her bladder infection has been treated-no date set yet    Additional findings: Pt had been prescribed Bactrim initially for her bladder infection, but did not take any.  Antibiotic switched to Macrobid instead.      PLAN:    Spoke with Raj  Nurse regarding INR result and instructed:     Warfarin Dosing Instructions: Continue your current warfarin dose    Instructed patient to follow up no later than: 1-2 weeks (nurse unable to check INR prior to 19)    Education provided: Yes: No interaction expected between Macrobid and warfarin      Raj verbalizes understanding and agrees to warfarin dosing plan.    Instructed to call the Anticoagulation Clinic for any changes, questions or concerns. (#[phone])        OBJECTIVE:  INR   Date Value Ref Range Status   2019 2.2 (A) 0.8 - 1.1 Corrected             Anticoagulation Summary  As of 2019    INR goal:   2.0-3.0   TTR:   100.0 % (2.4 wk)   INR used for dosin.2 (2019)   Warfarin maintenance plan:   3.75 mg (2.5 mg x 1.5) every Mon, Wed, Fri; 2.5 mg (2.5 mg x 1) all other days   Full warfarin instructions:   3.75 mg every Mon, Wed, Fri; 2.5 mg all other days   Weekly warfarin total:   21.25 mg   Plan last modified:   Azul Whitaker RN (10/23/2019)   Next INR check:   2019   Priority:   INR    Target end date:   Indefinite    Indications    Permanent atrial fibrillation [I48.21]  Chronic atrial fibrillation [I48.20]  Long term (current) use of anticoagulants [Z79.01]             Anticoagulation Episode Summary     INR check location:       Preferred lab:   EXTERNAL LAB    Send INR reminders to:   NIKKI CHASE    Comments:   Home care       Anticoagulation Care Providers     Provider Role Specialty Phone number    Patti Suárez MD [facility] Internal Medicine [phone]

## 2019-11-19 NOTE — TELEPHONE ENCOUNTER
Urine culture resistant to Bactrim (prescribed yesterday.)  I just sent a new prescription for nitrofurantoin

## 2019-11-19 NOTE — TELEPHONE ENCOUNTER
Patient calling requesting Urine Culture Results be relayed to her as soon as possible.  Patient states her daughter picked up her RX and she does not want to take if negative. Patient stated there is no way she does not have an infection because she has so much discomfort.      Patient also wants her B12 and Hemoglobin results.

## 2019-11-20 ENCOUNTER — TELEPHONE (OUTPATIENT)
Dept: INTERNAL MEDICINE | Facility: CLINIC | Age: 79
End: 2019-11-20

## 2019-11-20 LAB — VIT B1 BLD-MCNC: 94 NMOL/L (ref 70–180)

## 2019-11-21 ENCOUNTER — TELEPHONE (OUTPATIENT)
Dept: INTERNAL MEDICINE | Facility: CLINIC | Age: 79
End: 2019-11-21

## 2019-11-21 DIAGNOSIS — N30.01 ACUTE CYSTITIS WITH HEMATURIA: Primary | ICD-10-CM

## 2019-11-21 NOTE — TELEPHONE ENCOUNTER
"Patient calling--she continues not to feel well.  She has had body aches for the last couple days.  Also has an \"upset stomach\" but has only vomited once about 1 day ago.  She has not been drinking a lot of fluids and also not eating.  She took her temp during the phone call and it was 98.5 and normally temp is between 95 and 96 for her.  Her daughter is with her now, but is not able to stay with her very long.  Recommended that her daughter help her to get some fluids near her so that she can increase her fluid intake.  Also recommended that she take Tylenol.  She started the Macrobid yesterday and took 2 doses.  She did not take the Macrobid this morning since she was not feeling well.  Recommended that she try to take the Macrobid about 20 minutes after taking the tylenol and to try to have a small amount of food with it.  Reminded her that she has a fever and that it is important for her to take the antibiotic so that she can start feeling better.  Advised her to call back if she does not notice some improvement with in the next 24 hours.  Bella Arechiga RN     "

## 2019-11-22 RX ORDER — CEPHALEXIN 500 MG/1
500 CAPSULE ORAL 3 TIMES DAILY
Qty: 21 CAPSULE | Refills: 0 | Status: SHIPPED | OUTPATIENT
Start: 2019-11-22 | End: 2019-12-10

## 2019-11-22 NOTE — TELEPHONE ENCOUNTER
Call to patient. States daughter stopped by and felt patient looked better. States her daughter picked up her antibiotic and opened her Tylenol for her. Patient states she has had another 1 1/2 bottles of water and her urine is now gold in color. Advised patient to call if any further questions or concerns. Advised patient go to ER if increased pain or unable to continue taking fluids well. Patient agrees.

## 2019-11-22 NOTE — TELEPHONE ENCOUNTER
I agree with RN recommendation for ER eval    New Rx Keflex sent for UTI  Stop Nitrofurantoin

## 2019-11-22 NOTE — TELEPHONE ENCOUNTER
Call to patient. Advised OK to take Tylenol with max of 2000 mg/day per INR RN. Patient states her stomach is burning and hurts. States if she takes a deep breath there is a lot of pain in her stomach and back. Patient has been taking Macrobid since 11/20. She has taken 3 doses as she was not able to take the dose last evening due to stomach upset. Patient states the back pain started last evening and is on both sides of her back from above her waist to below her bra line. Patient states she is not able to eat and has not taken anything except crackers. States she has also not been taking fluids well. Patient states her urine is tan in color. Advised ER. Patient concerned about cost and inconveniencing her daughter because she is very busy at work. Patient states she would bargain with me. States she will take some Tylenol and work on increasing fluids today and if she does not feel better by tomorrow she will have her niece take her to the ER. Advised will discuss with Dr. Lauren and call patient back later this afternoon. Advised would recommend patient go to the ER if not feeling better by this evening. Patient agrees.

## 2019-11-22 NOTE — TELEPHONE ENCOUNTER
Pt calling requesting call back from clinic. She has concerns about taking Tylenol because she is currently on Warfarin. She would also like to know if Macrobid is really the right medication she should be on for the infection that she has. She stated the Macrobid is making her very ill. Pt can be reached at [phone]. OK to leave a detailed message. Please advise. Thanks.

## 2019-11-22 NOTE — TELEPHONE ENCOUNTER
Call to patient. States she has taken 12 ounces of water, 3 crackers and another dose of antibiotic since we last spoke. Patient sounds much more energetic than she did during previous conversation. States she is determined to do what ever she is told to not go to the ER. Patient has not taken any Tylenol as she can not get the bottle open. She will contact one of the nurses in the building to ask for assistance. Advised patient to stop current antibiotic and have someone  new antibiotic to take this evening. Patient agrees. Advised will check back with her later this afternoon. Patient agrees.

## 2019-11-24 ENCOUNTER — NURSE TRIAGE (OUTPATIENT)
Dept: NURSING | Facility: CLINIC | Age: 79
End: 2019-11-24

## 2019-11-24 NOTE — TELEPHONE ENCOUNTER
"Patient states she has been sick and now today has some swelling on right eyelid and tongue and roof of mouth feels raw, \"like I ate something hot and burned it but I haven't eaten anything hot.\"  Continues to try and drink fluids, has had 16 ounces thus far today.  States urine is lightening in color and back pain continues.  Denies fever.  Doesn't want to have to go in although she made a deal with Oksana that if pain increased and unable to drink fluids she would go in.  States will call clinic in the morning if she still doesn't feel well.  "

## 2019-11-25 NOTE — TELEPHONE ENCOUNTER
Patient is calling back because thinks she is feeling better. She will let us know if she needs anything.

## 2019-11-27 ENCOUNTER — NURSE TRIAGE (OUTPATIENT)
Dept: NURSING | Facility: CLINIC | Age: 79
End: 2019-11-27

## 2019-11-28 NOTE — TELEPHONE ENCOUNTER
"Patient reports she has been on an antibiotic for 7 days for a bladder infection. She has an \"absoluite raw tongue, raw roof of mouth, raw gums, and one canker sore in mouth on right side of cheek for the last 2-3 days.\"    Pain is between 7-8/10.   Able to swallow fluids  Ate 1/2 of supper  No difficulty breathing  No fever  Tongue is very tender and raw, doesn't feel very swollen    Patient states she is on Warafin and says Tylenol bottle says not to take while on Warafin.     Per protocol, advised to be seen within 4 hours.   Patient states, \"I am not going to come in. Now your gonna think I'm a bad patient. I'm thinking I need to stop this medication. I don't drive. I was hoping you were going to tell me to stop taking this antibiotic\"    Care advice reviewed. Advised to call back with difficulty breathing, fever, difficulty swallowing, swollen tongue, or any new or worsening symptoms.     Shannon Torres RN/[facility]    Reason for Disposition    [1] SEVERE mouth pain (e.g., excruciating) AND [2] not improved after 2 hours of pain medicine    Additional Information    Negative: Severe difficulty breathing (e.g., struggling for each breath, speaks in single words, stridor)    Negative: Sounds like a life-threatening emergency to the triager    Negative: [1] Difficulty breathing AND [2] not severe    Negative: Patient sounds very sick or weak to the triager    Negative: [1] Face is swollen AND [2] fever    Negative: [1] Drinking very little AND [2] dehydration suspected (e.g., no urine > 12 hours, very dry mouth, very lightheaded)    Negative: Electrical burn of mouth    Negative: Chemical burn of mouth    Negative: [1] Drooling or spitting out saliva (because can't swallow) AND [2] new onset    Negative: Tongue is very swollen and tender    Negative: Chemical in the mouth suspected cause of ulcers    Negative: [1] Drinking very little AND [2] dehydration suspected (e.g., no urine > 12 hours, very " "dry mouth, very lightheaded)    Negative: [1] Looks like fever blisters (\"cold sore\") AND [2] only on outer lip    Negative: Generalized skin rash from Chickenpox    Negative: Large blisters in mouth (i.e., fluid filled bubbles or sacs)    Negative: Generalized rash on body    Negative: Patient sounds very sick or weak to the triager    Negative: Gums are red, painful and have many ulcers    Negative: Fever    Negative: [1] One pimple or ulcer on the gum AND [2] near a toothache    Negative: Large lymph node (> 1 inch or 2.5 cm) under the jaw    Negative: Facial swelling    Negative: 4 or more ulcers    Negative: Mouth ulcer lasts > 2 weeks    Negative: Painless ulcer or sore    Negative: Longstanding or recurrent problems with arthritis, eye pain or genital sores    Probable canker sore(s)    Negative: Weak immune system (e.g., HIV positive, cancer chemo, splenectomy, organ transplant, chronic steroids)    Protocols used: MOUTH PAIN-A-AH, MOUTH ULCERS-A-AH      "

## 2019-11-30 ENCOUNTER — NURSE TRIAGE (OUTPATIENT)
Dept: NURSING | Facility: CLINIC | Age: 79
End: 2019-11-30

## 2019-11-30 DIAGNOSIS — K12.30 STOMATITIS AND MUCOSITIS: Primary | ICD-10-CM

## 2019-11-30 DIAGNOSIS — K12.1 STOMATITIS AND MUCOSITIS: Primary | ICD-10-CM

## 2019-11-30 RX ORDER — NYSTATIN 100000/ML
500000 SUSPENSION, ORAL (FINAL DOSE FORM) ORAL 4 TIMES DAILY
Qty: 400 ML | Refills: 1 | Status: SHIPPED | OUTPATIENT
Start: 2019-11-30 | End: 2020-01-27

## 2019-11-30 NOTE — TELEPHONE ENCOUNTER
Patient calling in due to severe mouth pain and symptoms:  Mouth is extremely sore, canker sores, tongue and gums feel raw, lips have cracked and are bleeding, almost as raw as the inside of her mouth.    Last dose of antibiotics was Wednesday, had 2 days of antibiotics left.     Dr. Fang called through page  at 1:42pm.  He will enter a prescription for Nystatin for the mouth.  He wanted RN to relay that he is not 100% certain it will help, so she should monitor symptoms and if no improvement, the needs to be seen in the clinic on Monday.     Roya Orozco RN on 11/30/2019 at 1:51 PM    Reason for Disposition    [1] SEVERE mouth pain (e.g., excruciating) AND [2] not improved after 2 hours of pain medicine    Additional Information    Negative: Severe difficulty breathing (e.g., struggling for each breath, speaks in single words, stridor)    Negative: Sounds like a life-threatening emergency to the triager    Negative: [1] Difficulty breathing AND [2] not severe    Negative: [1] Face is swollen AND [2] fever    Negative: [1] Drinking very little AND [2] dehydration suspected (e.g., no urine > 12 hours, very dry mouth, very lightheaded)    Negative: Patient sounds very sick or weak to the triager    Negative: [1] Drooling or spitting out saliva (because can't swallow) AND [2] new onset    Negative: Electrical burn of mouth    Negative: Chemical burn of mouth    Negative: Tongue is very swollen and tender    Negative: Followed a tooth (or teeth) injury    Negative: Followed a mouth injury    Negative: Throat is painful    Negative: Canker sore suspected (i.e., aphthous ulcer) in the mouth    Negative: Cold sore suspected (i.e., fever blister sore) on the outer lip    Negative: Tooth is painful or swelling around a tooth    Protocols used: MOUTH PAIN-A-AH

## 2019-11-30 NOTE — TELEPHONE ENCOUNTER
Spoke with Roya Orozco.   Faxed Rx for Nystatin per patient request.   Advise patient that the may not be the definitive treatment for the source of her symptoms, but will likely cause no harm.   She will need f/u this week in the office if not improving.

## 2019-12-02 ENCOUNTER — TELEPHONE (OUTPATIENT)
Dept: INTERNAL MEDICINE | Facility: CLINIC | Age: 79
End: 2019-12-02

## 2019-12-02 DIAGNOSIS — I48.20 CHRONIC ATRIAL FIBRILLATION (H): ICD-10-CM

## 2019-12-02 DIAGNOSIS — Z79.01 LONG TERM (CURRENT) USE OF ANTICOAGULANTS: ICD-10-CM

## 2019-12-02 DIAGNOSIS — I48.21 PERMANENT ATRIAL FIBRILLATION (H): ICD-10-CM

## 2019-12-02 LAB — INR PPP: 3.8

## 2019-12-02 NOTE — TELEPHONE ENCOUNTER
Discontinued orders for furosemide/ potassium/gabapentin/pravastatin/ and lorazepam received via fax. Form in your mailbox to be signed.

## 2019-12-02 NOTE — TELEPHONE ENCOUNTER
ANTICOAGULATION MANAGEMENT     Patient Name:  Savanna Rehman  Date:  12/2/2019    ASSESSMENT /SUBJECTIVE:      Today's INR result of 3.8 is supratherapeutic. Goal INR of 2.0-3.0      Warfarin dose taken: Warfarin taken as previously instructed    Diet: Nausea and diarrhea may be affecting diet and INR    Medication changes/ interactions: Interaction between Cephalexin (ended on 11/30) and warfarin may be affecting INR    Previous INR: Therapeutic     S/S of bleeding or thromboembolism: No    New injury or illness:  No    Upcoming surgery, procedure or cardioversion:  No    Additional findings: No      PLAN:    Spoke with Evelin (nurse) regarding INR result and instructed:     Warfarin Dosing Instructions: hold warfarin today then continue your current warfarin dose of 3.75mg 3.75 mg every Mon, Wed, Fri; 2.5 mg all other days    Instructed patient to follow up no later than: 1 week    Education provided: Yes:   significance of inr result; affect of cephalexin and warfarin    Patient is starting to feel better and she finished her course of the Cephalexin  Evelin verbalizes understanding and agrees to warfarin dosing plan.    Instructed to call the Anticoagulation Clinic for any changes, questions or concerns. (#[phone])        OBJECTIVE:  INR   Date Value Ref Range Status   12/02/2019 3.8  Final             Anticoagulation Summary  As of 12/2/2019    INR goal:   2.0-3.0   TTR:   78.3 % (1 mo)   INR used for dosing:   3.8! (12/2/2019)   Warfarin maintenance plan:   3.75 mg (2.5 mg x 1.5) every Mon, Wed, Fri; 2.5 mg (2.5 mg x 1) all other days   Full warfarin instructions:   12/2: Hold; Otherwise 3.75 mg every Mon, Wed, Fri; 2.5 mg all other days   Weekly warfarin total:   21.25 mg   Plan last modified:   Azul Whitaker RN (10/23/2019)   Next INR check:   12/10/2019   Priority:   Maintenance   Target end date:   Indefinite    Indications    Permanent atrial fibrillation [I48.21]  Chronic atrial fibrillation  [I48.20]  Long term (current) use of anticoagulants [Z79.01]             Anticoagulation Episode Summary     INR check location:       Preferred lab:   EXTERNAL LAB    Send INR reminders to:   NIKKI CHASE    Comments:   Home care       Anticoagulation Care Providers     Provider Role Specialty Phone number    Patti Suárez MD [facility] Internal Medicine [phone]

## 2019-12-03 ENCOUNTER — TELEPHONE (OUTPATIENT)
Dept: INTERNAL MEDICINE | Facility: CLINIC | Age: 79
End: 2019-12-03

## 2019-12-03 DIAGNOSIS — Z53.9 DIAGNOSIS NOT YET DEFINED: Primary | ICD-10-CM

## 2019-12-03 PROCEDURE — G0179 MD RECERTIFICATION HHA PT: HCPCS | Performed by: INTERNAL MEDICINE

## 2019-12-09 ENCOUNTER — TELEPHONE (OUTPATIENT)
Dept: INTERNAL MEDICINE | Facility: CLINIC | Age: 79
End: 2019-12-09

## 2019-12-09 NOTE — TELEPHONE ENCOUNTER
Patient has appt at 9:40 am. 12/10/2019 and has to come in early due to transportation. Can she leave a urine in lab before. Patient also wants a INR since she is here? Patient stated 20 mins is not enough time to do a px and all her meds. She is not sure why it was changed to a px. Please advise if she can have a urine done and a INR. Ok to call and   [phone]

## 2019-12-10 ENCOUNTER — OFFICE VISIT (OUTPATIENT)
Dept: INTERNAL MEDICINE | Facility: CLINIC | Age: 79
End: 2019-12-10
Payer: COMMERCIAL

## 2019-12-10 ENCOUNTER — ANTICOAGULATION THERAPY VISIT (OUTPATIENT)
Dept: ANTICOAGULATION | Facility: CLINIC | Age: 79
End: 2019-12-10
Payer: COMMERCIAL

## 2019-12-10 VITALS
SYSTOLIC BLOOD PRESSURE: 132 MMHG | HEIGHT: 68 IN | BODY MASS INDEX: 40.92 KG/M2 | TEMPERATURE: 98.6 F | RESPIRATION RATE: 12 BRPM | WEIGHT: 270 LBS | OXYGEN SATURATION: 94 % | HEART RATE: 81 BPM | DIASTOLIC BLOOD PRESSURE: 70 MMHG

## 2019-12-10 DIAGNOSIS — N30.00 ACUTE CYSTITIS WITHOUT HEMATURIA: ICD-10-CM

## 2019-12-10 DIAGNOSIS — Z79.01 LONG TERM (CURRENT) USE OF ANTICOAGULANTS: ICD-10-CM

## 2019-12-10 DIAGNOSIS — E55.9 VITAMIN D DEFICIENCY: ICD-10-CM

## 2019-12-10 DIAGNOSIS — R30.0 DYSURIA: ICD-10-CM

## 2019-12-10 DIAGNOSIS — E56.9 VITAMIN DEFICIENCY: ICD-10-CM

## 2019-12-10 DIAGNOSIS — E66.01 MORBID OBESITY (H): ICD-10-CM

## 2019-12-10 DIAGNOSIS — I48.20 CHRONIC ATRIAL FIBRILLATION (H): ICD-10-CM

## 2019-12-10 DIAGNOSIS — R82.90 NONSPECIFIC FINDING ON EXAMINATION OF URINE: ICD-10-CM

## 2019-12-10 DIAGNOSIS — G47.33 OSA (OBSTRUCTIVE SLEEP APNEA): ICD-10-CM

## 2019-12-10 DIAGNOSIS — I48.20 CHRONIC ATRIAL FIBRILLATION (H): Primary | ICD-10-CM

## 2019-12-10 DIAGNOSIS — E11.42 DIABETIC POLYNEUROPATHY ASSOCIATED WITH TYPE 2 DIABETES MELLITUS (H): ICD-10-CM

## 2019-12-10 DIAGNOSIS — E78.5 HYPERLIPIDEMIA LDL GOAL <100: ICD-10-CM

## 2019-12-10 DIAGNOSIS — I48.21 PERMANENT ATRIAL FIBRILLATION (H): ICD-10-CM

## 2019-12-10 LAB
ALBUMIN UR-MCNC: 30 MG/DL
APPEARANCE UR: ABNORMAL
BACTERIA #/AREA URNS HPF: ABNORMAL /HPF
BILIRUB UR QL STRIP: NEGATIVE
COLOR UR AUTO: YELLOW
GLUCOSE UR STRIP-MCNC: NEGATIVE MG/DL
HGB UR QL STRIP: ABNORMAL
INR POINT OF CARE: 2.7 (ref 0.86–1.14)
KETONES UR STRIP-MCNC: NEGATIVE MG/DL
LEUKOCYTE ESTERASE UR QL STRIP: ABNORMAL
NITRATE UR QL: POSITIVE
NON-SQ EPI CELLS #/AREA URNS LPF: ABNORMAL /LPF
PH UR STRIP: 5 PH (ref 5–7)
RBC #/AREA URNS AUTO: ABNORMAL /HPF
SOURCE: ABNORMAL
SP GR UR STRIP: 1.01 (ref 1–1.03)
UROBILINOGEN UR STRIP-ACNC: 0.2 EU/DL (ref 0.2–1)
WBC #/AREA URNS AUTO: ABNORMAL /HPF

## 2019-12-10 PROCEDURE — 87186 SC STD MICRODIL/AGAR DIL: CPT | Performed by: INTERNAL MEDICINE

## 2019-12-10 PROCEDURE — 36416 COLLJ CAPILLARY BLOOD SPEC: CPT

## 2019-12-10 PROCEDURE — 81001 URINALYSIS AUTO W/SCOPE: CPT | Performed by: INTERNAL MEDICINE

## 2019-12-10 PROCEDURE — 99207 ZZC NO CHARGE NURSE ONLY: CPT

## 2019-12-10 PROCEDURE — 85610 PROTHROMBIN TIME: CPT | Mod: QW

## 2019-12-10 PROCEDURE — 99214 OFFICE O/P EST MOD 30 MIN: CPT | Performed by: INTERNAL MEDICINE

## 2019-12-10 PROCEDURE — 87088 URINE BACTERIA CULTURE: CPT | Performed by: INTERNAL MEDICINE

## 2019-12-10 PROCEDURE — 87086 URINE CULTURE/COLONY COUNT: CPT | Performed by: INTERNAL MEDICINE

## 2019-12-10 RX ORDER — CEFUROXIME AXETIL 500 MG/1
500 TABLET ORAL 2 TIMES DAILY
Qty: 14 TABLET | Refills: 0 | Status: SHIPPED | OUTPATIENT
Start: 2019-12-10 | End: 2019-12-10

## 2019-12-10 RX ORDER — GLIPIZIDE 2.5 MG/1
2.5 TABLET, EXTENDED RELEASE ORAL DAILY
Qty: 90 TABLET | Refills: 1 | Status: SHIPPED | OUTPATIENT
Start: 2019-12-10 | End: 2020-01-27

## 2019-12-10 RX ORDER — CEFUROXIME AXETIL 500 MG/1
500 TABLET ORAL 2 TIMES DAILY
Qty: 14 TABLET | Refills: 0 | Status: SHIPPED | OUTPATIENT
Start: 2019-12-10 | End: 2020-01-16

## 2019-12-10 RX ORDER — PRAVASTATIN SODIUM 20 MG
20 TABLET ORAL AT BEDTIME
Qty: 90 TABLET | Refills: 1 | Status: SHIPPED | OUTPATIENT
Start: 2019-12-10 | End: 2020-01-25

## 2019-12-10 ASSESSMENT — MIFFLIN-ST. JEOR: SCORE: 1758.21

## 2019-12-10 NOTE — PATIENT INSTRUCTIONS
Plan:  1. Vitamin D 51043 units once a week for 6 months  2. In 6 months we will check the vit D level   3. B complex 1 tab daily for 3 months - will help with low vit B 6  4. Please make a lab appointment for fasting labs in 3 months  5. Please make an appointment few days after the labs to discuss about the results - ANNUAL EXAM

## 2019-12-10 NOTE — PROGRESS NOTES
ANTICOAGULATION FOLLOW-UP CLINIC VISIT    Patient Name:  Savanna Rehman  Date:  12/10/2019  Contact Type:  Face to Face    SUBJECTIVE:  Patient Findings     Comments:   The patient was assessed for diet, medication, and activity level changes, missed or extra doses, bruising or bleeding, with no problem findings.  Patient was in clinic today and was due for INR check. Patient has Home Care visit set up for 19 and will have INR checked at that time.         Clinical Outcomes     Negatives:   Major bleeding event, Thromboembolic event, Anticoagulation-related hospital admission, Anticoagulation-related ED visit, Anticoagulation-related fatality    Comments:   The patient was assessed for diet, medication, and activity level changes, missed or extra doses, bruising or bleeding, with no problem findings.  Patient was in clinic today and was due for INR check. Patient has Home Care visit set up for 19 and will have INR checked at that time.            OBJECTIVE    INR Protime   Date Value Ref Range Status   12/10/2019 2.7 (A) 0.86 - 1.14 Final       ASSESSMENT / PLAN  INR assessment THER    Recheck INR In: 8 DAYS    INR Location Clinic      Anticoagulation Summary  As of 12/10/2019    INR goal:   2.0-3.0   TTR:   67.6 % (1.3 mo)   INR used for dosin.7 (12/10/2019)   Warfarin maintenance plan:   3.75 mg (2.5 mg x 1.5) every Mon, Wed, Fri; 2.5 mg (2.5 mg x 1) all other days   Full warfarin instructions:   3.75 mg every Mon, Wed, Fri; 2.5 mg all other days   Weekly warfarin total:   21.25 mg   No change documented:   Tammy Oconnor RN   Plan last modified:   Azul Whitaker RN (10/23/2019)   Next INR check:   2019   Priority:   Maintenance   Target end date:   Indefinite    Indications    Permanent atrial fibrillation [I48.21]  Chronic atrial fibrillation [I48.20]  Long term (current) use of anticoagulants [Z79.01]             Anticoagulation Episode Summary     INR check location:        Preferred lab:   EXTERNAL LAB    Send INR reminders to:   NIKKI CHASE    Comments:   Home care       Anticoagulation Care Providers     Provider Role Specialty Phone number    Patti Suárez MD [facility] Internal Medicine [phone]            See the Encounter Report to view Anticoagulation Flowsheet and Dosing Calendar (Go to Encounters tab in chart review, and find the Anticoagulation Therapy Visit)    Dosage adjustment made based on physician directed care plan.    Tammy Oconnor RN

## 2019-12-10 NOTE — PROGRESS NOTES
Dr Lauren's note      Patient's instructions / PLAN:                                                        Plan:  1. Vitamin D 27517 units once a week for 6 months  2. In 6 months we will check the vit D level   3. B complex 1 tab daily for 3 months - will help with low vit B 6  4. Please make a lab appointment for fasting labs in 3 months  5. 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)  Comment: Controlled    Plan: glipiZIDE (GLUCOTROL XL) 2.5 MG 24 hr tablet,         pravastatin (PRAVACHOL) 20 MG tablet,         Hemoglobin A1c, ACE/ARB/ARNI NOT PRESCRIBED         (INTENTIONAL), BETA BLOCKER NOT PRESCRIBED         (INTENTIONAL)          (E66.01) Morbid obesity (H)  Comment:   Plan:  we discussed about diet ( reducing calories intake) and increasing the exercise      (G47.33) CRISTIANA (obstructive sleep apnea) - CPAP  Comment: her concentrator is too expensive   Plan: uses oxygen every night with the CPAP    (E78.5) Hyperlipidemia LDL goal <100  Comment: Not controlled   Plan: Comprehensive metabolic panel, Lipid panel         reflex to direct LDL Fasting  as above           (E56.9) Vitamin deficiency  Comment:   Plan: Vitamin D Deficiency, Vitamin B6          (R82.90) Nonspecific finding on examination of urine  Comment:   Plan:  Urine Culture Aerobic Bacterial    (Z79.01) Long term (current) use of anticoagulants  (primary encounter diagnosis)  Comment:   Plan: Urine Microscopic, f/u INR           (R30.0) Dysuria  Comment: UTI: Ceftin Rx   Plan: UA reflex to Microscopic     (E55.9) Vitamin D deficiency  Comment:   Plan: vitamin D3 (CHOLECALCIFEROL) 1.25 MG (93843 UT)        capsule, Vitamin D Deficiency, Vitamin B6                       Chief complaint:                                                      Follow up chronic medical problems      SUBJECTIVE:                                                      Savanna Rehman is a 79 year old female who presents to clinic today for the following health issues:    Nov 11 labs reviewed    Vit D   -- very low     CRISTIANA - on CPAP  -- She has a concentrator and it is too expensive for her     + 1 edema. She cant put on compression socks     Diabetes Follow-up      How often are you checking your blood sugar? Not at all    What concerns do you have today about your diabetes? None     Do you have any of these symptoms? (Select all that apply)  Burning in feet & numbness     Have you had a diabetic eye exam in the last 12 months? Yes- Date of last eye exam: 2/2017-[facility]-some diabetic concerns/doesnt think retinopathy    BP Readings from Last 2 Encounters:   10/18/19 98/70   09/18/18 129/88     Hemoglobin A1C (%)   Date Value   11/16/2019 6.4 (H)   07/02/2019 6.8 (A)     LDL Cholesterol Calculated (mg/dL)   Date Value   11/16/2019 113 (H)   07/02/2019 90.4       Diabetes Management Resources    Hyperlipidemia Follow-Up      Are you regularly taking any medication or supplement to lower your cholesterol?   Yes- pravastatin, but stopped about 45 days ago due to s/e of possible dizziness    Are you having muscle aches or other side effects that you think could be caused by your cholesterol lowering medication?  No      How many servings of fruits and vegetables do you eat daily?  0-1    On average, how many sweetened beverages do you drink each day (Examples: soda, juice, sweet tea, etc.  Do NOT count diet or artificially sweetened beverages)?   0    How many days per week do you miss taking your medication? 0      Review of Systems:                                                      ROS: negative for fever, chills, cough, wheezes, chest pain, shortness of breath, vomiting, abdominal pain, pos for leg  "swelling     OBJECTIVE:           Physical exam:  Blood pressure 132/70, pulse 81, temperature 98.6  F (37  C), temperature source Oral, resp. rate 12, height 1.727 m (5' 8\"), weight 122.5 kg (270 lb), SpO2 94 %, not currently breastfeeding.   NAD, appears comfortable  Skin: no rashes   Neck: supple, no JVD,  No thyroidmegaly. Lymph nodes nonpalpable cervical and supraclavicular.  Chest: clear to auscultation bilaterally, good respiratory effort  Heart: S1 S2, RRR, no mgr appreciated  Abdomen: soft, not tender,   Extremities: , +1 edema   Neurologic: A, Ox3, no focal signs appreciated    PMHx: reviewed  Past Medical History:   Diagnosis Date     Anemia     Iron Deficiency anemia     Atrial fibrillation (H)      CAD (coronary artery disease)     non-obstructive     Chronic pain     neck, low back, legs     Congestive heart failure (H)      Degenerative disk disease      Fibromyalgia      Gastro-oesophageal reflux disease      Gout      Hiatal hernia      Mumps      Neuropathy      Palpitations      Pernicious anemia      Sleep apnea     uses CPAP.     Spider veins      Urinary incontinence      Vitamin D deficiency       PSHx: reviewed  Past Surgical History:   Procedure Laterality Date     APPENDECTOMY       CHOLECYSTECTOMY       COLONOSCOPY  3/15/2011     CORONARY ANGIOGRAPHY ADULT ORDER       HEART CATH LEFT HEART CATH  12/30/16    medication management     HYSTERECTOMY TOTAL ABDOMINAL       Knee replacement NOS Left      LAPAROSCOPIC NISSEN FUNDOPLICATION N/A 2/4/2015    Procedure: LAPAROSCOPIC NISSEN FUNDOPLICATION;  Surgeon: Armando Ansari MD;  Location: [facility] OR     TONSILLECTOMY       TRANSPOSITION ULNAR NERVE (ELBOW)          Meds: reviewed  Current Outpatient Medications   Medication Sig Dispense Refill     ACETAMINOPHEN PO Take 500 mg by mouth       glipiZIDE (GLUCOTROL XL) 2.5 MG 24 hr tablet Take 2.5 mg by mouth daily  1     Ibuprofen (ADVIL PO) Take 400 mg by mouth every 8 hours as needed for moderate " pain       LORazepam (ATIVAN) 0.5 MG tablet Take 1 tablet (0.5 mg) by mouth every 4 hours as needed for anxiety or other (chest pain) (Patient taking differently: Take 0.5 mg by mouth as needed for anxiety or other (chest pain) ) 12 tablet 0     order for DME Oxygen 2 Li/min  at night bleed with CPAP       oxybutynin (DITROPAN) 5 MG tablet Take 1 tablet (5 mg) by mouth daily Clarified as immediate release with [facility]./Patient       warfarin ANTICOAGULANT (COUMADIN) 2.5 MG tablet Take 3.75mg(1 1/2 tablets) Mon/Wed/Fri and 2.5mg (1 tablet) the rest of the days of the week. Or as directed by the Anticoagulation Clinic 126 tablet 0     nystatin (MYCOSTATIN) 334183 UNIT/ML suspension Take 5 mLs (500,000 Units) by mouth 4 times daily 400 mL 1     pravastatin (PRAVACHOL) 20 MG tablet Take 20 mg by mouth At Bedtime   1       Soc Hx: reviewed  Fam Hx: reviewed          Patti Lauren MD  Internal Medicine

## 2019-12-10 NOTE — NURSING NOTE
"/70   Pulse 81   Temp 98.6  F (37  C) (Oral)   Resp 12   Ht 1.727 m (5' 8\")   Wt 122.5 kg (270 lb)   LMP  (LMP Unknown)   SpO2 94%   Breastfeeding No   BMI 41.05 kg/m      "

## 2019-12-11 ENCOUNTER — TELEPHONE (OUTPATIENT)
Dept: UROLOGY | Facility: CLINIC | Age: 79
End: 2019-12-11

## 2019-12-11 ENCOUNTER — TELEPHONE (OUTPATIENT)
Dept: INTERNAL MEDICINE | Facility: CLINIC | Age: 79
End: 2019-12-11

## 2019-12-11 DIAGNOSIS — I48.20 CHRONIC ATRIAL FIBRILLATION (H): ICD-10-CM

## 2019-12-11 DIAGNOSIS — I48.21 PERMANENT ATRIAL FIBRILLATION (H): ICD-10-CM

## 2019-12-11 DIAGNOSIS — Z79.01 LONG TERM (CURRENT) USE OF ANTICOAGULANTS: ICD-10-CM

## 2019-12-11 NOTE — TELEPHONE ENCOUNTER
Anticoagulation Management    Fannie with Home care called to report that Savanna will be starting an antibiotic of cefuroxime for 7 days.  Fannie was advised this should not interact will warfarin.  Patient had INR done in clinic on tues 12/10/19, dosing instructions and next check date were given to Fannie.  Home care will do INR on 12/18/19.      Anticoagulation clinic to follow up    Iris Kemp RN

## 2019-12-11 NOTE — TELEPHONE ENCOUNTER
[facility] Call Center    Phone Message    May a detailed message be left on voicemail: yes    Reason for Call: Other: Savanna calling to report that she had a severe bladder infection that started about 5-6 weeks ago, took an antibiotic for it and it went into Thrush and had to stop taking the antibiotic a few days prior to finishing them.  Savanna went to see her PCP yesterday and asked her if she could do a UA/UC and the UA came back positive for an infection, the culture has not come back yet.  She is highly sensative to meds and now the medication that she is on she was told by the pharmacy that it is a new drug and should get rid of it.  She has a cysto with Dr. Wilson coming up and is concerned about  doing that procedure.  Should she still have the Cysto with the bladder infection?  She is not necessarily wanting to have the Cysto done, but is wanting Dr. Wilson's opinion on this.  Please call Savanna back to advise     Action Taken: Message routed to:  [facility] ([facility] UA Uro

## 2019-12-11 NOTE — TELEPHONE ENCOUNTER
1.  Vitamin D3 over-the-counter, 4000 to 5000 units daily for the next 6 months  2.  B complex tablets over-the-counter 1 tablet daily for the next 3 months

## 2019-12-11 NOTE — TELEPHONE ENCOUNTER
Patient calls and states that both the Vitamin D and B6 are not covered by [facility] and only available over the counter.  Patient states she is more concerned about the B6 and what she should do.  Please advise.

## 2019-12-11 NOTE — TELEPHONE ENCOUNTER
Called patient to let her know of the recommendations as noted below.  Patient verbalized understanding.

## 2019-12-11 NOTE — TELEPHONE ENCOUNTER
Spoke to patient  She is infected  Now. She is not scheduled for a scope on Tuesday  But she needs a scope. Suggested she treat the infection she has now and get scheduled for a cysto next available . Needs a cath uc 7-10 days prior to scope.Roya Nicole LPN

## 2019-12-13 LAB
BACTERIA SPEC CULT: ABNORMAL
SPECIMEN SOURCE: ABNORMAL

## 2019-12-16 ENCOUNTER — TELEPHONE (OUTPATIENT)
Dept: INTERNAL MEDICINE | Facility: CLINIC | Age: 79
End: 2019-12-16

## 2019-12-16 NOTE — TELEPHONE ENCOUNTER
Call to below number. Left detailed message advising addendum was done. Unsure if this needs to be faxed to them or if they can see it since they are also [place].

## 2019-12-16 NOTE — TELEPHONE ENCOUNTER
Denny at [facility] [phone] calls requesting Face to Face Visit on 12/10/19 be addended to mention if she is using her Oxygen.  Just a simple statement like she uses oxygen at night and why.

## 2019-12-18 ENCOUNTER — TELEPHONE (OUTPATIENT)
Dept: INTERNAL MEDICINE | Facility: CLINIC | Age: 79
End: 2019-12-18

## 2019-12-18 DIAGNOSIS — I48.20 CHRONIC ATRIAL FIBRILLATION (H): ICD-10-CM

## 2019-12-18 DIAGNOSIS — I48.21 PERMANENT ATRIAL FIBRILLATION (H): ICD-10-CM

## 2019-12-18 DIAGNOSIS — Z79.01 LONG TERM (CURRENT) USE OF ANTICOAGULANTS: ICD-10-CM

## 2019-12-18 LAB — INR PPP: 2.8 (ref 0.9–1.1)

## 2019-12-18 NOTE — TELEPHONE ENCOUNTER
ANTICOAGULATION MANAGEMENT     Patient Name:  Savanna Rehman  Date:  12/18/2019    ASSESSMENT /SUBJECTIVE:      Today's INR result of 2.8 is therapeutic. Goal INR of 2.0-3.0      Warfarin dose taken: Warfarin taken as previously instructed    Diet: No new diet changes affecting INR    Medication changes/ interactions: No new medications/supplements affecting INR    Previous INR: Therapeutic     S/S of bleeding or thromboembolism: No    New injury or illness:  No    Upcoming surgery, procedure or cardioversion:  No    Additional findings: none      PLAN:    Spoke with RN regarding INR result and instructed:     Warfarin Dosing Instructions: Continue your current warfarin dose    Instructed patient to follow up no later than: 2 weeks    Education provided: Terri Anthony RN verbalizes understanding and agrees to warfarin dosing plan.    Instructed to call the Anticoagulation Clinic for any changes, questions or concerns. (#[phone])        OBJECTIVE:  INR   Date Value Ref Range Status   12/18/2019 2.8 (A) 0.90 - 1.10 Corrected             Anticoagulation Summary  As of 12/18/2019    INR goal:   2.0-3.0   TTR:   73.2 % (1.5 mo)   INR used for dosing:      Warfarin maintenance plan:   3.75 mg (2.5 mg x 1.5) every Mon, Wed, Fri; 2.5 mg (2.5 mg x 1) all other days   Full warfarin instructions:   3.75 mg every Mon, Wed, Fri; 2.5 mg all other days   Weekly warfarin total:   21.25 mg   Plan last modified:   Azul Whitaker RN (10/23/2019)   Next INR check:      Priority:   Maintenance   Target end date:   Indefinite    Indications    Permanent atrial fibrillation [I48.21]  Chronic atrial fibrillation [I48.20]  Long term (current) use of anticoagulants [Z79.01]             Anticoagulation Episode Summary     INR check location:       Preferred lab:   EXTERNAL LAB    Send INR reminders to:   NIKIK CHASE    Comments:   Home care       Anticoagulation Care Providers     Provider Role Specialty Phone number     Patti Suárez MD [facility] Internal Medicine [phone]

## 2020-01-02 ENCOUNTER — TELEPHONE (OUTPATIENT)
Dept: INTERNAL MEDICINE | Facility: CLINIC | Age: 80
End: 2020-01-02

## 2020-01-02 DIAGNOSIS — I48.20 CHRONIC ATRIAL FIBRILLATION (H): ICD-10-CM

## 2020-01-02 DIAGNOSIS — Z79.01 LONG TERM (CURRENT) USE OF ANTICOAGULANTS: ICD-10-CM

## 2020-01-02 DIAGNOSIS — I48.21 PERMANENT ATRIAL FIBRILLATION (H): ICD-10-CM

## 2020-01-02 LAB — INR PPP: 2.7 (ref 0.9–1.1)

## 2020-01-02 NOTE — TELEPHONE ENCOUNTER
ANTICOAGULATION MANAGEMENT     Patient Name:  Savanna Rehman  Date:  2020    ASSESSMENT /SUBJECTIVE:      Today's INR result of 2.7 is therapeutic. Goal INR of 2.0-3.0      Warfarin dose taken: Warfarin taken as previously instructed    Diet: No new diet changes affecting INR    Medication changes/ interactions: No new medications/supplements affecting INR    Previous INR: Therapeutic     S/S of bleeding or thromboembolism: No    New injury or illness:  No    Upcoming surgery, procedure or cardioversion:  No    Additional findings: None      PLAN:    Spoke with Fannie home care nurse regarding INR result and instructed:     Warfarin Dosing Instructions: Continue your current warfarin dose    Instructed patient to follow up no later than: 3 weeks, home care sees her every other week and will recheck on 1/15/20 due to this    Education provided: No      [facility] home care nurse verbalizes understanding and agrees to warfarin dosing plan.    Instructed to call the Anticoagulation Clinic for any changes, questions or concerns. (#[phone])        OBJECTIVE:  INR   Date Value Ref Range Status   2020 2.7 (A) 0.90 - 1.10 Final             Anticoagulation Summary  As of 2020    INR goal:   2.0-3.0   TTR:   79.8 % (2 mo)   INR used for dosin.7 (2020)   Warfarin maintenance plan:   3.75 mg (2.5 mg x 1.5) every Mon, Wed, Fri; 2.5 mg (2.5 mg x 1) all other days   Full warfarin instructions:   3.75 mg every Mon, Wed, Fri; 2.5 mg all other days   Weekly warfarin total:   21.25 mg   Plan last modified:   Azul Whitaker RN (10/23/2019)   Next INR check:   2020   Priority:   Maintenance   Target end date:   Indefinite    Indications    Permanent atrial fibrillation [I48.21]  Chronic atrial fibrillation [I48.20]  Long term (current) use of anticoagulants [Z79.01]             Anticoagulation Episode Summary     INR check location:       Preferred lab:   EXTERNAL LAB    Send INR reminders to:    NIKKI CHASE    Comments:   Home care       Anticoagulation Care Providers     Provider Role Specialty Phone number    Patti Suárez MD [facility] Internal Medicine [phone]

## 2020-01-06 ENCOUNTER — TELEPHONE (OUTPATIENT)
Dept: INTERNAL MEDICINE | Facility: CLINIC | Age: 80
End: 2020-01-06

## 2020-01-12 ENCOUNTER — NURSE TRIAGE (OUTPATIENT)
Dept: NURSING | Facility: CLINIC | Age: 80
End: 2020-01-12

## 2020-01-12 NOTE — TELEPHONE ENCOUNTER
"Savanna reports multiple health concerns:  1) \"thought she had the flu on Friday\"  2) \"has not been able to take medications since she is unable to keep fluids down.\"  3) \"stomach is going to explode, I think it's going to my kidneys.\"  4) \"hard time breathing, shaky, extremely dizzy and has not eaten for 2 days.\"  5) \"I have only drank a bottle and a half of water since yesterday, my mouth is very dry, I am extremely dizzy, my urine is gold in color.\"  6) \"My pee is not a regular stream, unable to reach the bathroom to pee and had to change my diapers twice.\"    She is requesting for antibiotics to be prescribed over the phone as she thinks she has UTI. \"if you recommend urgent care or submit a urine for testing, we'll have to stop this conversation.\" States she has no means of transportation. Also requests to have medication delivered to her since she has no one to  her medications. Son might be able to  the medication tomorrow in case one is prescribed.    Per protocol, advised ED. Care advice reviewed. Patient was upset as she insists to have antibiotics prescribed. Given the nature of her concerns, FNA recommends to have her evaluated in an ED. She states that she will call the clinic tomorrow and refuses disposition. Patient hung up.    Natacha Echevarria RN/[place] Nurse Advisor      Reason for Disposition    [1] Decreased urination and [2] drinking very little AND [2] dehydration suspected (e.g., dark urine, no urine > 12 hours, very dry mouth, very lightheaded)    Additional Information    Negative: Shock suspected (e.g., cold/pale/clammy skin, too weak to stand, low BP, rapid pulse)    Negative: Sounds like a life-threatening emergency to the triager    Negative: [1] Unable to urinate (or only a few drops) > 4 hours AND     [2] bladder feels very full (e.g., palpable bladder or strong urge to urinate)    Protocols used: URINARY SYMPTOMS-A-AH      "

## 2020-01-13 NOTE — TELEPHONE ENCOUNTER
"Patient calls the clinic asking for medication for a UTI. States she has chronic UTI's, has little control to urinate and is using a \"diaper\". She feels sick to her stomach and dizzy. She is unable to come in or leave a urine sample due to lack of transportation. Please call her back to advise at [phone].    "

## 2020-01-13 NOTE — TELEPHONE ENCOUNTER
Will route to provider for review.    Please see FNA notes below. Patient refused ED, urgent care, or UA. Patient requesting antibiotics.

## 2020-01-13 NOTE — TELEPHONE ENCOUNTER
Patient called again to see if medication was going to be called into her pharmacy. I offered patient a same day visit tomorrow but she declined. Please call her back ASAP.

## 2020-01-14 NOTE — TELEPHONE ENCOUNTER
"Left message on home voicemail that \"the doctor would like to see you at 1:20 p.m. today\" and asked that she call back.  BENITA Graham R.N.    "

## 2020-01-14 NOTE — TELEPHONE ENCOUNTER
Symptoms are too complex to diagnosed her with UTI with no UA.   I can't just prescribed Abx for UTI.   I will see her Thursday   It would be better if she comes little earlier so we do the urine test.

## 2020-01-14 NOTE — TELEPHONE ENCOUNTER
"Called to offer 1:20 p.m. appt today with primary care provider today for her symptoms.  Patient insists she cannot get a ride to clinic as she has no one to bring her, cannot afford a cab and she is too ill to travel to clinic.  Offered to call for ambulance to transport her to ER but she refuses.  Offered to call her daughter to see if she can give her a ride but patient does not want RN to call daughter.  States her daughter is an RN and is seeing clients in [place] today.  Was seeing clients in [place] all day yesterday.  Refuses ER or UC.    Patient is no longer vomiting.  Is having diarrhea, 2 stools in past 24 hours.  States it is not unusual for her to have diarrhea intermittently due to diabetes meds. Started trying to eat a little yesterday, trying to drink more fluids.  Denies fever.  Is urinating.  Feels like there is pressure \"in stomach\" and clearly states she wants to be treated for a UTI without appointment.      Has an appointment with urology 2/28/19.  Reports she has had to cancel appointments there because she keeps getting UTIs and cannot go if she has an infection.    Offered appt. In clinic Thursday 1/16/20, patient did accept this and will try to get a ride.  BENITA Garham R.N.    "

## 2020-01-15 ENCOUNTER — TELEPHONE (OUTPATIENT)
Dept: INTERNAL MEDICINE | Facility: CLINIC | Age: 80
End: 2020-01-15

## 2020-01-15 DIAGNOSIS — Z79.01 LONG TERM (CURRENT) USE OF ANTICOAGULANTS: ICD-10-CM

## 2020-01-15 DIAGNOSIS — I48.20 CHRONIC ATRIAL FIBRILLATION (H): ICD-10-CM

## 2020-01-15 DIAGNOSIS — I48.21 PERMANENT ATRIAL FIBRILLATION (H): ICD-10-CM

## 2020-01-15 LAB — INR PPP: 2.5 (ref 0.9–1.1)

## 2020-01-15 NOTE — TELEPHONE ENCOUNTER
ANTICOAGULATION MANAGEMENT     Patient Name:  Savanna Rehman  Date:  1/15/2020    ASSESSMENT /SUBJECTIVE:    Today's INR result of 2.5 is therapeutic. Goal INR of 2.0-3.0      Warfarin dose taken: Missed dose(s) may be affecting INR    Diet: decreased appetite/poor po intake may be affecting diet and INR    Medication changes/ interactions: No new medications/supplements affecting INR    Previous INR: Therapeutic     S/S of bleeding or thromboembolism: No    New injury or illness:  Yes: Pt started feeling ill ; had vomiting and diarrhea (resolved), now has abd and back pain and thinks she may have a UTI.    Upcoming surgery, procedure or cardioversion:  No    Additional findings:  Has OV tomorrow () and HC nurse Raj will contact [facility] if pt starts antibiotics or any other changes so we can check an INR sooner.    PLAN:    Spoke with Raj (HC nurse- [facility]) regarding INR result and instructed:     Warfarin Dosing Instructions: Continue your current warfarin dose of 3.75mg Mon/Wed/Fri; 2.5mg all other days    Instructed patient to follow up no later than: 2 weeks    Education provided: Yes: If pt starts on abx or has any other medication or health changes, that can potentially affect INR and to please update [facility] if any changes occur.    Raj verbalizes understanding and agrees to warfarin dosing plan.    Instructed to call the Anticoagulation Clinic for any changes, questions or concerns. (#[phone])        OBJECTIVE:  INR   Date Value Ref Range Status   01/15/2020 2.5 (A) 0.90 - 1.10 Final             Anticoagulation Summary  As of 1/15/2020    INR goal:   2.0-3.0   TTR:   83.4 % (2.5 mo)   INR used for dosin.5 (1/15/2020)   Warfarin maintenance plan:   3.75 mg (2.5 mg x 1.5) every Mon, Wed, Fri; 2.5 mg (2.5 mg x 1) all other days   Full warfarin instructions:   3.75 mg every Mon, Wed, Fri; 2.5 mg all other days   Weekly warfarin total:   21.25 mg   Plan last  modified:   Azul Whitaker, RN (10/23/2019)   Next INR check:   1/29/2020   Priority:   High   Target end date:   Indefinite    Indications    Permanent atrial fibrillation [I48.21]  Chronic atrial fibrillation [I48.20]  Long term (current) use of anticoagulants [Z79.01]             Anticoagulation Episode Summary     INR check location:       Preferred lab:   EXTERNAL LAB    Send INR reminders to:   NIKKI CHASE    Comments:   Home care       Anticoagulation Care Providers     Provider Role Specialty Phone number    Patti Suárez MD [facility] Internal Medicine [phone]

## 2020-01-16 ENCOUNTER — TELEPHONE (OUTPATIENT)
Dept: INTERNAL MEDICINE | Facility: CLINIC | Age: 80
End: 2020-01-16

## 2020-01-16 ENCOUNTER — OFFICE VISIT (OUTPATIENT)
Dept: INTERNAL MEDICINE | Facility: CLINIC | Age: 80
End: 2020-01-16
Payer: COMMERCIAL

## 2020-01-16 VITALS
HEART RATE: 81 BPM | OXYGEN SATURATION: 98 % | WEIGHT: 266 LBS | TEMPERATURE: 98.4 F | DIASTOLIC BLOOD PRESSURE: 82 MMHG | RESPIRATION RATE: 16 BRPM | BODY MASS INDEX: 40.45 KG/M2 | SYSTOLIC BLOOD PRESSURE: 124 MMHG

## 2020-01-16 DIAGNOSIS — I48.91 ATRIAL FIBRILLATION, UNSPECIFIED TYPE (H): ICD-10-CM

## 2020-01-16 DIAGNOSIS — N30.00 ACUTE CYSTITIS WITHOUT HEMATURIA: ICD-10-CM

## 2020-01-16 DIAGNOSIS — I50.32 CHRONIC DIASTOLIC (CONGESTIVE) HEART FAILURE (H): ICD-10-CM

## 2020-01-16 DIAGNOSIS — E66.01 MORBID OBESITY (H): ICD-10-CM

## 2020-01-16 DIAGNOSIS — R10.84 ABDOMINAL PAIN, GENERALIZED: ICD-10-CM

## 2020-01-16 DIAGNOSIS — R30.0 DYSURIA: Primary | ICD-10-CM

## 2020-01-16 DIAGNOSIS — I48.21 PERMANENT ATRIAL FIBRILLATION (H): ICD-10-CM

## 2020-01-16 DIAGNOSIS — Z79.01 LONG TERM (CURRENT) USE OF ANTICOAGULANTS: ICD-10-CM

## 2020-01-16 DIAGNOSIS — I48.20 CHRONIC ATRIAL FIBRILLATION (H): ICD-10-CM

## 2020-01-16 DIAGNOSIS — E11.42 DIABETIC POLYNEUROPATHY ASSOCIATED WITH TYPE 2 DIABETES MELLITUS (H): ICD-10-CM

## 2020-01-16 DIAGNOSIS — N39.0 FREQUENT UTI: ICD-10-CM

## 2020-01-16 DIAGNOSIS — B35.3 TINEA PEDIS OF BOTH FEET: ICD-10-CM

## 2020-01-16 LAB
ALBUMIN UR-MCNC: NEGATIVE MG/DL
APPEARANCE UR: ABNORMAL
BACTERIA #/AREA URNS HPF: ABNORMAL /HPF
BILIRUB UR QL STRIP: NEGATIVE
COLOR UR AUTO: YELLOW
GLUCOSE UR STRIP-MCNC: NEGATIVE MG/DL
HGB UR QL STRIP: ABNORMAL
KETONES UR STRIP-MCNC: NEGATIVE MG/DL
LEUKOCYTE ESTERASE UR QL STRIP: ABNORMAL
NITRATE UR QL: POSITIVE
NON-SQ EPI CELLS #/AREA URNS LPF: ABNORMAL /LPF
PH UR STRIP: 5.5 PH (ref 5–7)
RBC #/AREA URNS AUTO: ABNORMAL /HPF
SOURCE: ABNORMAL
SP GR UR STRIP: 1.02 (ref 1–1.03)
UROBILINOGEN UR STRIP-ACNC: 0.2 EU/DL (ref 0.2–1)
WBC #/AREA URNS AUTO: ABNORMAL /HPF

## 2020-01-16 PROCEDURE — 81001 URINALYSIS AUTO W/SCOPE: CPT | Performed by: INTERNAL MEDICINE

## 2020-01-16 PROCEDURE — 87088 URINE BACTERIA CULTURE: CPT | Performed by: INTERNAL MEDICINE

## 2020-01-16 PROCEDURE — 87186 SC STD MICRODIL/AGAR DIL: CPT | Performed by: INTERNAL MEDICINE

## 2020-01-16 PROCEDURE — 87086 URINE CULTURE/COLONY COUNT: CPT | Performed by: INTERNAL MEDICINE

## 2020-01-16 PROCEDURE — 99215 OFFICE O/P EST HI 40 MIN: CPT | Performed by: INTERNAL MEDICINE

## 2020-01-16 RX ORDER — CEFUROXIME AXETIL 500 MG/1
500 TABLET ORAL 2 TIMES DAILY
Qty: 20 TABLET | Refills: 0 | Status: SHIPPED | OUTPATIENT
Start: 2020-01-16 | End: 2020-01-27

## 2020-01-16 RX ORDER — NITROFURANTOIN MACROCRYSTAL 100 MG
100 CAPSULE ORAL AT BEDTIME
Qty: 30 CAPSULE | Refills: 1 | Status: SHIPPED | OUTPATIENT
Start: 2020-01-16 | End: 2020-01-31

## 2020-01-16 NOTE — PROGRESS NOTES
Dr Lauren's note      Patient's instructions / PLAN:                                                        Plan:  1. Soak feet and ankle in warm water. Try to brush the dead skin. Then use Cetaphil cream. 10 - 15 min later apply Lamisil cream   2. Cefuroxime 500 mg twice a day - antibiotic for a week, then you start  3. Nitrofurantoin 100 mg daily at bed time  4. Keep your appointment with the urologist     ASSESSMENT & PLAN:                                                    79 year old woman with medical problems significant for DM with neuropathy, a fib, on chronic coumadin, chr diastolic CHF, obesity, comes today complaining of abd discomfort and dysuria.   She has hx of frequent UTI infections. She rescheduled several appointments with Urology. Because of her frequent UTIs she requested prophylactic antibiotic. She has GI discomfort and she has multiple allergies to antibiotics. She does not want to wait for Urology appt, so I state her on nitrofurantoin, as above.     (R30.0) Dysuria  (primary encounter diagnosis)  Comment: Not controlled   Plan: UA reflex to Microscopic and Culture, Urine         Microscopic            (R10.84) Abdominal pain, generalized  Comment: Not controlled, could be related to UTI  Plan: as above     (B35.3) Tinea pedis of both feet  Comment: Not controlled   Plan: as above     (N30.00) Acute cystitis without hematuria  Comment:   Plan: Urine Culture Aerobic Bacterial, cefuroxime         (CEFTIN) 500 MG tablet            (N39.0) Frequent UTI  Comment: as above   Plan: nitroFURantoin macrocrystal (MACRODANTIN) 100         MG capsule           Chief complaint:                                                      Abd pain  Dysuria  Feet   Present with daughter    SUBJECTIVE:                                                    History of present illness:      She called 2 day ago asking for antibiotics for UTI. She couldn't come to do urine sample and because she had other abdominal  symptoms I didn't prescribe antibiotics but I adviser her to come in for visit. Today is the first day she could find a ride  She has many questions about her urology symptoms. She got oral thrush when she used antibiotic last time. She still have nystatin solution left at home and she will use if needed.     She c/o dry feet and purple toes no pain.     Dizziness    Vomiting and Nausea  -- started 1/11/2019 also with abd and back pain  -- next day she was not feeling well but no vomiting  -- her pain persists today, and she thinks she may have UTI  -- soft BMs, no diarrhea, bloody stools    Urine Symptoms  -- urgency is new   -- she uses pull-ups  -- s/p hysterectomy age 39    Mouth thrush  -- nystatin       Diabetes Follow-up    How often are you checking your blood sugar? A few times a week  What time of day are you checking your blood sugars (select all that apply)?  Before meals  Have you had any blood sugars above 200?  Yes   Have you had any blood sugars below 70?  No    What symptoms do you notice when your blood sugar is low?  None    What concerns do you have today about your diabetes? None     Do you have any of these symptoms? (Select all that apply)  Numbness in feet, Redness, sores, or blisters on feet and Blurry vision    Have you had a diabetic eye exam in the last 12 months? No        BP Readings from Last 2 Encounters:   12/10/19 132/70   10/18/19 98/70     Hemoglobin A1C (%)   Date Value   11/16/2019 6.4 (H)   07/02/2019 6.8 (A)     LDL Cholesterol Calculated (mg/dL)   Date Value   11/16/2019 113 (H)   07/02/2019 90.4         Hyperlipidemia Follow-Up      Are you regularly taking any medication or supplement to lower your cholesterol?   No    Are you having muscle aches or other side effects that you think could be caused by your cholesterol lowering medication?  No      How many servings of fruits and vegetables do you eat daily?  2-3    On average, how many sweetened beverages do you drink each  day (Examples: soda, juice, sweet tea, etc.  Do NOT count diet or artificially sweetened beverages)?   6    How many days per week do you exercise enough to make your heart beat faster? 3 or less    How many minutes a day do you exercise enough to make your heart beat faster? 9 or less    How many days per week do you miss taking your medication? 0      Review of Systems:                                                      ROS: negative for fever, chills, cough, wheezes, chest pain, shortness of breath, vomiting, abdominal pain, leg swelling, pos for tinea pedis     This document serves as a record of the services and decisions personally performed and made by Dr. Leonidas MD. It was created on their behalf by Figueroa Reynoso, a trained medical scribe. The creation of this document is based on the provider's statements to the medical scribe.  Figueroa Reynoso January 16, 2020 11:18 AM    OBJECTIVE:             Physical exam:  Blood pressure 124/82, pulse 81, temperature 98.4  F (36.9  C), resp. rate 16, weight 120.7 kg (266 lb), SpO2 98 %, not currently breastfeeding.   NAD, appears comfortable  Skin: no rashes   Neck: supple, no JVD,  No thyroidmegaly. Lymph nodes nonpalpable cervical and supraclavicular.  Chest: clear to auscultation bilaterally, good respiratory effort  Heart: S1 S2, RRR, no mgr appreciated  Abdomen: soft, not tender, no hepatosplenomegaly or masses appreciated, no abdominal bruit, present bowel sounds  Extremities: no edema, clubbing, cyanosis. Palpable pedal pulses bilaterally very dry skin, I think she has tinea pedis, no signs of ischemia, no feet skin lesions   Neurologic: A, Ox3, no focal signs appreciated    PMHx: reviewed  Past Medical History:   Diagnosis Date     Anemia     Iron Deficiency anemia     Atrial fibrillation (H)      CAD (coronary artery disease)     non-obstructive     Chronic pain     neck, low back, legs     Congestive heart failure (H)      Degenerative disk disease       Fibromyalgia      Gastro-oesophageal reflux disease      Gout      Hiatal hernia      Mumps      Neuropathy      Palpitations      Pernicious anemia      Sleep apnea     uses CPAP.     Spider veins      Urinary incontinence      Vitamin D deficiency       PSHx: reviewed  Past Surgical History:   Procedure Laterality Date     APPENDECTOMY       CHOLECYSTECTOMY       COLONOSCOPY  3/15/2011     CORONARY ANGIOGRAPHY ADULT ORDER       HEART CATH LEFT HEART CATH  12/30/16    medication management     HYSTERECTOMY TOTAL ABDOMINAL       Knee replacement NOS Left      LAPAROSCOPIC NISSEN FUNDOPLICATION N/A 2/4/2015    Procedure: LAPAROSCOPIC NISSEN FUNDOPLICATION;  Surgeon: Armando Ansari MD;  Location: [facility] OR     TONSILLECTOMY       TRANSPOSITION ULNAR NERVE (ELBOW)          Meds: reviewed  Current Outpatient Medications   Medication Sig Dispense Refill     ACE/ARB/ARNI NOT PRESCRIBED (INTENTIONAL) Please choose reason not prescribed, below       ACETAMINOPHEN PO Take 500 mg by mouth       BETA BLOCKER NOT PRESCRIBED (INTENTIONAL) Beta Blocker not prescribed intentionally due to Hypotension (SBP < 90)       cefuroxime (CEFTIN) 500 MG tablet Take 1 tablet (500 mg) by mouth 2 times daily 14 tablet 0     glipiZIDE (GLUCOTROL XL) 2.5 MG 24 hr tablet Take 1 tablet (2.5 mg) by mouth daily 90 tablet 1     Ibuprofen (ADVIL PO) Take 400 mg by mouth every 8 hours as needed for moderate pain       LORazepam (ATIVAN) 0.5 MG tablet Take 1 tablet (0.5 mg) by mouth every 4 hours as needed for anxiety or other (chest pain) (Patient taking differently: Take 0.5 mg by mouth as needed for anxiety or other (chest pain) ) 12 tablet 0     nystatin (MYCOSTATIN) 808384 UNIT/ML suspension Take 5 mLs (500,000 Units) by mouth 4 times daily 400 mL 1     order for DME Oxygen 2 Li/min  at night bleed with CPAP       oxybutynin (DITROPAN) 5 MG tablet Take 1 tablet (5 mg) by mouth daily Clarified as immediate release with [facility]./Patient        pravastatin (PRAVACHOL) 20 MG tablet Take 1 tablet (20 mg) by mouth At Bedtime 90 tablet 1     vitamin D3 (CHOLECALCIFEROL) 1.25 MG (80838 UT) capsule Take 1 capsule (50,000 Units) by mouth every 7 days 13 capsule 1     warfarin ANTICOAGULANT (COUMADIN) 2.5 MG tablet Take 3.75mg(1 1/2 tablets) Mon/Wed/Fri and 2.5mg (1 tablet) the rest of the days of the week. Or as directed by the Anticoagulation Clinic 126 tablet 0       Soc Hx: reviewed  Fam Hx: reviewed    Time spent with the patient  40 min, more than 50% in counseling and coordinating care, Re above medical problems abdominal pain, frequent urinary tract infections, dysuria, feet skin changes, reviewing recent labs, answering her multiple questions      Patti Lauren MD  Internal Medicine

## 2020-01-16 NOTE — PATIENT INSTRUCTIONS
Plan:  1. Soak feet and ankle in warm water. Try to brush the dead skin. Then use Cetaphil cream. 10 - 15 min later apply Lamisil cream   2. Cefuroxime 500 mg twice a day - antibiotic for a week, then you start  3. Nitrofurantoin 100 mg daily at bed time  4. Keep your appointment with the urologist

## 2020-01-16 NOTE — TELEPHONE ENCOUNTER
Spoke with Home care Nurse Raj and informed her patient is on antibiotics:  Cefuroxime 500 mg BID x 10 days then will start Nitrofurantoin microcrystal 100 mg at Bedtime for 30 days for UTI.  Home care will check patient's INR on Wednesday.  Azul Whitaker RN  Anticoagulation Nurse - [facility] [place]

## 2020-01-18 ENCOUNTER — NURSE TRIAGE (OUTPATIENT)
Dept: NURSING | Facility: CLINIC | Age: 80
End: 2020-01-18

## 2020-01-18 LAB
BACTERIA SPEC CULT: ABNORMAL
BACTERIA SPEC CULT: ABNORMAL
SPECIMEN SOURCE: ABNORMAL

## 2020-01-19 ENCOUNTER — TELEPHONE (OUTPATIENT)
Dept: NURSING | Facility: CLINIC | Age: 80
End: 2020-01-19

## 2020-01-19 ENCOUNTER — NURSE TRIAGE (OUTPATIENT)
Dept: NURSING | Facility: CLINIC | Age: 80
End: 2020-01-19

## 2020-01-19 DIAGNOSIS — N30.00 ACUTE CYSTITIS WITHOUT HEMATURIA: Primary | ICD-10-CM

## 2020-01-19 DIAGNOSIS — N39.0 FREQUENT UTI: ICD-10-CM

## 2020-01-19 NOTE — TELEPHONE ENCOUNTER
S: Patient calling for UC results.  B: Writer told patient UC results were positive. Patient wants to make sure the Ceftin is the right antibiotic for the bacteria in the UC.  A: Writer will send Dr Cordoba message [phone].  R: Please follow up with patient.  Lucy Sethi RN, [facility]

## 2020-01-19 NOTE — TELEPHONE ENCOUNTER
S: Patient calling for UC results.  B: Writer told patient UC results were positive. Patient wants to make sure the Ceftin is the right antibiotic for the bacteria in the UC.  A: Writer will send Dr Cordoba message.  R: Please follow up with patient.  Lucy Sethi RN, [facility]      Reason for Disposition    Health Information question, no triage required and triager able to answer question    Protocols used: INFORMATION ONLY CALL-A-

## 2020-01-19 NOTE — TELEPHONE ENCOUNTER
"Caller is inquiring if  Sensitivity on urine is back yet   Caller reports that she continues to have dysuria, difficulty emptying badder and  Back pain and \"chills: but has not taken her taempraturae   Triage protocol reviewed   Caller is advised to  be seen for  evaluation of worsening symptoms   Caller is adamant that she will not be seen in ED or UC and has expectations that  a different antibiotic may be in order   Caller  states if she were to get a new antibiotic she does not know how she will get it from the pharmacy  Caller is advised to continue current antibiotics , increase fluid intake and raest   Caller is advised to check temperature and call if she feels chills again  Caller is advised to  Call for any other new or worsening  symptoms including increased  pain, hematuria , or vomiting   Caller agrees she  will call for worsening symptoms and heed home care advice   Karina Hitchcock RN  FNA             Reason for Disposition    [1] Taking antibiotic > 24 hours for UTI AND [2] flank or lower back pain worsening    Additional Information    Negative: Shock suspected (e.g., cold/pale/clammy skin, too weak to stand, low BP, rapid pulse)    Negative: Sounds like a life-threatening emergency to the triager    Negative: Urinary tract infection suspected, but not taking antibiotics    Negative: [1] Unable to urinate (or only a few drops) > 4 hours AND     [2] bladder feels very full (e.g., palpable bladder or strong urge to urinate)    Negative: Passing pure blood or large blood clots (i.e., size > a dime)  (Exceptions: flecks, small strands, or pinkish-red color)    Negative: Patient sounds very sick or weak to the triager    Protocols used: URINARY TRACT INFECTION ON ANTIBIOTIC FOLLOW-UP CALL - FEMALE-A-ZOË      "

## 2020-01-21 ENCOUNTER — TELEPHONE (OUTPATIENT)
Dept: INTERNAL MEDICINE | Facility: CLINIC | Age: 80
End: 2020-01-21

## 2020-01-21 NOTE — TELEPHONE ENCOUNTER
Repeat the urine test  The patient has 18 allergies on the list, including Levaquin, Neosporin, nitrofurantoin, bacitracin,  The urine culture it sensitive to Ceftin  I think she should see urology too.  I will make the referral

## 2020-01-21 NOTE — TELEPHONE ENCOUNTER
Patient states she has been on Ceftin for 5.5 days and feels symptoms are worse.  Even has pain over bladder with walking around her home.  Also having headache still. Denies dysuria or fever.  BENITA Graham R.N.

## 2020-01-22 ENCOUNTER — TELEPHONE (OUTPATIENT)
Dept: UROLOGY | Facility: CLINIC | Age: 80
End: 2020-01-22

## 2020-01-22 ENCOUNTER — TELEPHONE (OUTPATIENT)
Dept: INTERNAL MEDICINE | Facility: CLINIC | Age: 80
End: 2020-01-22

## 2020-01-22 DIAGNOSIS — I48.20 CHRONIC ATRIAL FIBRILLATION (H): ICD-10-CM

## 2020-01-22 DIAGNOSIS — I48.21 PERMANENT ATRIAL FIBRILLATION (H): ICD-10-CM

## 2020-01-22 DIAGNOSIS — Z79.01 LONG TERM (CURRENT) USE OF ANTICOAGULANTS: ICD-10-CM

## 2020-01-22 LAB — INR PPP: 2.3 (ref 0.9–1.1)

## 2020-01-22 NOTE — TELEPHONE ENCOUNTER
ANTICOAGULATION MANAGEMENT     Patient Name:  Savanna Rehman  Date:  2020    ASSESSMENT /SUBJECTIVE:      Today's INR result of 2.3 is therapeutic. Goal INR of 2.0-3.0      Warfarin dose taken: Warfarin taken as previously instructed    Diet: No new diet changes affecting INR    Medication changes/ interactions: Interaction between ceftin, macrobid and warfarin may be affecting INR    Previous INR: Therapeutic     S/S of bleeding or thromboembolism: No    New injury or illness:  Yes: UTI treated 20    Upcoming surgery, procedure or cardioversion:  No    Additional findings: Patient started on ceftin 20 for UTI, will complete ceftin on 20 and will begin macrobid on 20      PLAN:    Spoke with [facility] regarding INR result and instructed:     Warfarin Dosing Instructions: Continue your current warfarin dose    Instructed patient to follow up no later than: 1 week  Orders given to  Homecare nurse/facility to recheck    Education provided: Yes: monitor for s/s of bleeding, affect of antibiotic on INR      [facility] home care nurse verbalizes understanding and agrees to warfarin dosing plan.    Instructed to call the Anticoagulation Clinic for any changes, questions or concerns. (#[phone])        OBJECTIVE:  INR   Date Value Ref Range Status   2020 2.3 (A) 0.90 - 1.10 Final             Anticoagulation Summary  As of 2020    INR goal:   2.0-3.0   TTR:   84.8 % (2.7 mo)   INR used for dosin.3 (2020)   Warfarin maintenance plan:   3.75 mg (2.5 mg x 1.5) every Mon, Wed, Fri; 2.5 mg (2.5 mg x 1) all other days   Full warfarin instructions:   3.75 mg every Mon, Wed, Fri; 2.5 mg all other days   Weekly warfarin total:   21.25 mg   Plan last modified:   Azul Whitaker RN (10/23/2019)   Next INR check:   2020   Priority:   High   Target end date:   Indefinite    Indications    Permanent atrial fibrillation [I48.21]  Chronic atrial fibrillation  [I48.20]  Long term (current) use of anticoagulants [Z79.01]             Anticoagulation Episode Summary     INR check location:       Preferred lab:   EXTERNAL LAB    Send INR reminders to:   NIKKI CHASE    Comments:   Home care       Anticoagulation Care Providers     Provider Role Specialty Phone number    Patti Suárez MD [facility] Internal Medicine [phone]

## 2020-01-22 NOTE — TELEPHONE ENCOUNTER
Spoke to patient and review her upcoming appointments . She is being treated by primary for a UTI right now.Roya Nicole LPN

## 2020-01-22 NOTE — TELEPHONE ENCOUNTER
[facility] Call Center    Phone Message    May a detailed message be left on voicemail: yes    Reason for Call: Other:      Pt calling in asking for Dr Wilson's nurse to call her.   She did not want to tell me her story just to have to tell it again to the nurse so I was unable to get any details.     Please call her to discuss.     Action Taken: Other: [facility]

## 2020-01-23 ENCOUNTER — TELEPHONE (OUTPATIENT)
Dept: INTERNAL MEDICINE | Facility: CLINIC | Age: 80
End: 2020-01-23

## 2020-01-23 DIAGNOSIS — E11.42 DIABETIC POLYNEUROPATHY ASSOCIATED WITH TYPE 2 DIABETES MELLITUS (H): ICD-10-CM

## 2020-01-23 NOTE — TELEPHONE ENCOUNTER
Patient stats she doesn't see Dr. Wilson until March 10th, believes she will have a cystoscopy then but will see Dr. Wilson's nurse on 1/28/20 for a catheterized urine sample.  At this time she states she is feeling better and having less pain when she walks.  She will complete the Ceftin and see how she is feeling.  She knows there is an order in computer for UA so when able to she will give a UA but doesn't have transportation.  BENITA Graham R.N.

## 2020-01-25 RX ORDER — PRAVASTATIN SODIUM 20 MG
20 TABLET ORAL AT BEDTIME
Qty: 1 TABLET | Refills: 0
Start: 2020-01-25 | End: 2020-01-27

## 2020-01-27 ENCOUNTER — HOSPITAL ENCOUNTER (EMERGENCY)
Facility: CLINIC | Age: 80
Discharge: HOME | End: 2020-01-27
Attending: EMERGENCY MEDICINE | Admitting: EMERGENCY MEDICINE
Payer: COMMERCIAL

## 2020-01-27 ENCOUNTER — APPOINTMENT (OUTPATIENT)
Dept: CT IMAGING | Facility: CLINIC | Age: 80
End: 2020-01-27
Attending: EMERGENCY MEDICINE
Payer: COMMERCIAL

## 2020-01-27 ENCOUNTER — TELEPHONE (OUTPATIENT)
Dept: INTERNAL MEDICINE | Facility: CLINIC | Age: 80
End: 2020-01-27

## 2020-01-27 ENCOUNTER — APPOINTMENT (OUTPATIENT)
Dept: GENERAL RADIOLOGY | Facility: CLINIC | Age: 80
End: 2020-01-27
Attending: EMERGENCY MEDICINE
Payer: COMMERCIAL

## 2020-01-27 VITALS
SYSTOLIC BLOOD PRESSURE: 115 MMHG | DIASTOLIC BLOOD PRESSURE: 69 MMHG | HEART RATE: 76 BPM | TEMPERATURE: 100.8 F | OXYGEN SATURATION: 90 % | RESPIRATION RATE: 16 BRPM

## 2020-01-27 DIAGNOSIS — R06.00 DYSPNEA, UNSPECIFIED TYPE: ICD-10-CM

## 2020-01-27 DIAGNOSIS — M62.81 GENERALIZED MUSCLE WEAKNESS: ICD-10-CM

## 2020-01-27 DIAGNOSIS — R53.83 OTHER FATIGUE: ICD-10-CM

## 2020-01-27 LAB
ALBUMIN UR-MCNC: NEGATIVE MG/DL
ANION GAP SERPL CALCULATED.3IONS-SCNC: 7 MMOL/L (ref 3–14)
APPEARANCE UR: CLEAR
BACTERIA #/AREA URNS HPF: ABNORMAL /HPF
BASOPHILS # BLD AUTO: 0.1 10E9/L (ref 0–0.2)
BASOPHILS NFR BLD AUTO: 0.3 %
BILIRUB UR QL STRIP: NEGATIVE
BUN SERPL-MCNC: 19 MG/DL (ref 7–30)
CALCIUM SERPL-MCNC: 9.4 MG/DL (ref 8.5–10.1)
CHLORIDE SERPL-SCNC: 102 MMOL/L (ref 94–109)
CO2 SERPL-SCNC: 28 MMOL/L (ref 20–32)
COLOR UR AUTO: YELLOW
CREAT SERPL-MCNC: 0.79 MG/DL (ref 0.52–1.04)
D DIMER PPP FEU-MCNC: 0.3 UG/ML FEU (ref 0–0.5)
DIFFERENTIAL METHOD BLD: ABNORMAL
EOSINOPHIL # BLD AUTO: 0.1 10E9/L (ref 0–0.7)
EOSINOPHIL NFR BLD AUTO: 0.6 %
ERYTHROCYTE [DISTWIDTH] IN BLOOD BY AUTOMATED COUNT: 13.3 % (ref 10–15)
FLUAV+FLUBV AG SPEC QL: NEGATIVE
FLUAV+FLUBV AG SPEC QL: NEGATIVE
GFR SERPL CREATININE-BSD FRML MDRD: 72 ML/MIN/{1.73_M2}
GLUCOSE SERPL-MCNC: 182 MG/DL (ref 70–99)
GLUCOSE UR STRIP-MCNC: NEGATIVE MG/DL
HCT VFR BLD AUTO: 45.8 % (ref 35–47)
HGB BLD-MCNC: 15.2 G/DL (ref 11.7–15.7)
HGB UR QL STRIP: ABNORMAL
IMM GRANULOCYTES # BLD: 0.1 10E9/L (ref 0–0.4)
IMM GRANULOCYTES NFR BLD: 0.4 %
INR PPP: 2.23 (ref 0.86–1.14)
KETONES UR STRIP-MCNC: 10 MG/DL
LACTATE BLD-SCNC: 1.7 MMOL/L (ref 0.7–2)
LEUKOCYTE ESTERASE UR QL STRIP: NEGATIVE
LYMPHOCYTES # BLD AUTO: 0.8 10E9/L (ref 0.8–5.3)
LYMPHOCYTES NFR BLD AUTO: 4.5 %
MCH RBC QN AUTO: 30.4 PG (ref 26.5–33)
MCHC RBC AUTO-ENTMCNC: 33.2 G/DL (ref 31.5–36.5)
MCV RBC AUTO: 92 FL (ref 78–100)
MONOCYTES # BLD AUTO: 0.8 10E9/L (ref 0–1.3)
MONOCYTES NFR BLD AUTO: 4.3 %
MUCOUS THREADS #/AREA URNS LPF: PRESENT /LPF
NEUTROPHILS # BLD AUTO: 16 10E9/L (ref 1.6–8.3)
NEUTROPHILS NFR BLD AUTO: 89.9 %
NITRATE UR QL: NEGATIVE
NRBC # BLD AUTO: 0 10*3/UL
NRBC BLD AUTO-RTO: 0 /100
NT-PROBNP SERPL-MCNC: 957 PG/ML (ref 0–1800)
PH UR STRIP: 5 PH (ref 5–7)
PLATELET # BLD AUTO: 226 10E9/L (ref 150–450)
POTASSIUM SERPL-SCNC: 4.3 MMOL/L (ref 3.4–5.3)
RBC # BLD AUTO: 5 10E12/L (ref 3.8–5.2)
RBC #/AREA URNS AUTO: <1 /HPF (ref 0–2)
SODIUM SERPL-SCNC: 137 MMOL/L (ref 133–144)
SOURCE: ABNORMAL
SP GR UR STRIP: 1.02 (ref 1–1.03)
SPECIMEN SOURCE: NORMAL
SQUAMOUS #/AREA URNS AUTO: 1 /HPF (ref 0–1)
TROPONIN I SERPL-MCNC: <0.015 UG/L (ref 0–0.04)
UROBILINOGEN UR STRIP-MCNC: NORMAL MG/DL (ref 0–2)
WBC # BLD AUTO: 17.8 10E9/L (ref 4–11)
WBC #/AREA URNS AUTO: 0 /HPF (ref 0–5)

## 2020-01-27 PROCEDURE — 85379 FIBRIN DEGRADATION QUANT: CPT | Performed by: EMERGENCY MEDICINE

## 2020-01-27 PROCEDURE — 87804 INFLUENZA ASSAY W/OPTIC: CPT | Performed by: EMERGENCY MEDICINE

## 2020-01-27 PROCEDURE — 25800030 ZZH RX IP 258 OP 636: Performed by: EMERGENCY MEDICINE

## 2020-01-27 PROCEDURE — 85610 PROTHROMBIN TIME: CPT | Performed by: EMERGENCY MEDICINE

## 2020-01-27 PROCEDURE — 87086 URINE CULTURE/COLONY COUNT: CPT | Performed by: EMERGENCY MEDICINE

## 2020-01-27 PROCEDURE — 80048 BASIC METABOLIC PNL TOTAL CA: CPT | Performed by: EMERGENCY MEDICINE

## 2020-01-27 PROCEDURE — 93005 ELECTROCARDIOGRAM TRACING: CPT | Mod: 76

## 2020-01-27 PROCEDURE — 96361 HYDRATE IV INFUSION ADD-ON: CPT

## 2020-01-27 PROCEDURE — 99285 EMERGENCY DEPT VISIT HI MDM: CPT | Mod: 25

## 2020-01-27 PROCEDURE — 74177 CT ABD & PELVIS W/CONTRAST: CPT

## 2020-01-27 PROCEDURE — 96360 HYDRATION IV INFUSION INIT: CPT | Mod: 59

## 2020-01-27 PROCEDURE — 71046 X-RAY EXAM CHEST 2 VIEWS: CPT

## 2020-01-27 PROCEDURE — 81001 URINALYSIS AUTO W/SCOPE: CPT | Performed by: EMERGENCY MEDICINE

## 2020-01-27 PROCEDURE — 83605 ASSAY OF LACTIC ACID: CPT | Performed by: EMERGENCY MEDICINE

## 2020-01-27 PROCEDURE — 93005 ELECTROCARDIOGRAM TRACING: CPT

## 2020-01-27 PROCEDURE — 25000125 ZZHC RX 250: Performed by: EMERGENCY MEDICINE

## 2020-01-27 PROCEDURE — 25000128 H RX IP 250 OP 636: Performed by: EMERGENCY MEDICINE

## 2020-01-27 PROCEDURE — 85025 COMPLETE CBC W/AUTO DIFF WBC: CPT | Performed by: EMERGENCY MEDICINE

## 2020-01-27 PROCEDURE — 36415 COLL VENOUS BLD VENIPUNCTURE: CPT | Performed by: EMERGENCY MEDICINE

## 2020-01-27 PROCEDURE — 84484 ASSAY OF TROPONIN QUANT: CPT | Performed by: EMERGENCY MEDICINE

## 2020-01-27 PROCEDURE — 87040 BLOOD CULTURE FOR BACTERIA: CPT | Performed by: EMERGENCY MEDICINE

## 2020-01-27 PROCEDURE — 83880 ASSAY OF NATRIURETIC PEPTIDE: CPT | Performed by: EMERGENCY MEDICINE

## 2020-01-27 RX ORDER — NITROFURANTOIN MACROCRYSTALS 50 MG/1
100 CAPSULE ORAL AT BEDTIME
Status: CANCELLED | OUTPATIENT
Start: 2020-01-27

## 2020-01-27 RX ORDER — ANTIARTHRITIC COMBINATION NO.2 900 MG
5000 TABLET ORAL AT BEDTIME
COMMUNITY
End: 2022-10-20

## 2020-01-27 RX ORDER — LIDOCAINE 40 MG/G
CREAM TOPICAL
Status: CANCELLED | OUTPATIENT
Start: 2020-01-27

## 2020-01-27 RX ORDER — IOPAMIDOL 755 MG/ML
500 INJECTION, SOLUTION INTRAVASCULAR ONCE
Status: COMPLETED | OUTPATIENT
Start: 2020-01-27 | End: 2020-01-27

## 2020-01-27 RX ORDER — NICOTINE POLACRILEX 4 MG
15-30 LOZENGE BUCCAL
Status: CANCELLED | OUTPATIENT
Start: 2020-01-27

## 2020-01-27 RX ORDER — ONDANSETRON 4 MG/1
4 TABLET, ORALLY DISINTEGRATING ORAL EVERY 6 HOURS PRN
Status: CANCELLED | OUTPATIENT
Start: 2020-01-27

## 2020-01-27 RX ORDER — GLIPIZIDE 2.5 MG/1
2.5 TABLET, EXTENDED RELEASE ORAL AT BEDTIME
COMMUNITY
End: 2020-06-30

## 2020-01-27 RX ORDER — NALOXONE HYDROCHLORIDE 0.4 MG/ML
.1-.4 INJECTION, SOLUTION INTRAMUSCULAR; INTRAVENOUS; SUBCUTANEOUS
Status: CANCELLED | OUTPATIENT
Start: 2020-01-27

## 2020-01-27 RX ORDER — DEXTROSE MONOHYDRATE 25 G/50ML
25-50 INJECTION, SOLUTION INTRAVENOUS
Status: CANCELLED | OUTPATIENT
Start: 2020-01-27

## 2020-01-27 RX ORDER — ACETAMINOPHEN 325 MG/1
650 TABLET ORAL EVERY 4 HOURS PRN
Status: CANCELLED | OUTPATIENT
Start: 2020-01-27

## 2020-01-27 RX ORDER — OXYBUTYNIN CHLORIDE 5 MG/1
5 TABLET ORAL AT BEDTIME
Status: CANCELLED | OUTPATIENT
Start: 2020-01-27

## 2020-01-27 RX ORDER — ONDANSETRON 2 MG/ML
4 INJECTION INTRAMUSCULAR; INTRAVENOUS EVERY 6 HOURS PRN
Status: CANCELLED | OUTPATIENT
Start: 2020-01-27

## 2020-01-27 RX ADMIN — SODIUM CHLORIDE 65 ML: 9 INJECTION, SOLUTION INTRAVENOUS at 11:53

## 2020-01-27 RX ADMIN — IOPAMIDOL 100 ML: 755 INJECTION, SOLUTION INTRAVENOUS at 11:53

## 2020-01-27 RX ADMIN — SODIUM CHLORIDE 500 ML: 9 INJECTION, SOLUTION INTRAVENOUS at 12:53

## 2020-01-27 ASSESSMENT — ENCOUNTER SYMPTOMS
FEVER: 1
PALPITATIONS: 0
WEAKNESS: 1
SHORTNESS OF BREATH: 1
ABDOMINAL PAIN: 1
COUGH: 0
BACK PAIN: 1
NERVOUS/ANXIOUS: 1
DIZZINESS: 1

## 2020-01-27 NOTE — ED PROVIDER NOTES
"  History     Chief Complaint:  Generalized Weakness; Shortness of Breath; and Fever      HPI   Savanna Rehman is a 79 year old female with a history of recurrent UTI (currently on Macrodantin), urgency-frequency syndrome, chronic congestive heart failure, chronic Atrial Fibrillation on Coumadin, type II DM, and fibromyalgia who presents via EMS wearing oxygen with generalized weakness, shortness of breath, and fever. Patient reports that she has not used her CPAP since 12/25/2018. She denies any missed doses of Coumadin recently; her last INR was 2.3 on 1/22/2020. Today, she is febrile to 100.8 on presentation. Savanna reports that she was initially diagnosed with a UTI three weeks ago and treated with Ceftin. She then was again placed on Ceftin on 1/16 for recurrent UTI. At that time, she was also given Macrodantin to take as a maintenance antibiotic after she finished the Ceftin. Despite the antibiotics, Savanna feels as though she has not gotten better.  Yesterday, Savanna ate out with her daughter and had a burger, but vomited afterwards. This morning, patient called EMS today for ongoing weakness, dizziness, shortness of breath, and subjective fever; blood glucose was 174 for EMS. She describes feeling \"very panicky\" alongside the shortness of breath and weakness. Additionally, she has a chronic cough -- this is unchanged.   Daughter also notes that patient has been complaining of diffuse lower abdominal pain wrapping around into her bilateral low back; patient's daughter is slightly concerned that this could be something other than a UTI. Patient and daughter deny worsening leg swelling, chest pain, and palpitations. Patient does note that she is supposed to follow up with Dr. Wilson, but has been unable to see her because of ongoing, recurrent UTI's. She is supposed to have a cystoscopy 2/28 with Dr. Wilson's office, which the EHR confirms.     Allergies:  Augmented Betamethasone Diprop [Betamethasone]  Petroleum " Jelly [Petrolatum]  Shellfish-Derived Products  Aspirin  Bacitracin  Coumadin [Warfarin]  Darvon [Propoxyphene]  Dilaudid [Hydromorphone]  Levaquin [Levofloxacin]  Neosporin [Neomycin-Polymyx-Gramicid]  Nitrofurantoin  Oxycodone  Percodan [Oxycodone-Aspirin]  Tramadol  Vicodin [Hydrocodone-Acetaminophen]  Xarelto [Rivaroxaban]  Adhesive Tape  Codeine      Medications:    Glipizide   Lorazepam   Macrodantin    Oxybutynin   Pravastatin   Coumadin      Past Medical History:    Anemia  Atrial Fibrillation   Coronary artery disease    Chronic pain  Congestive heart failure  Degenerative disc disease   Fibromyalgia   Gastroesophageal reflux disease  Gout   Hiatal hernia  Mumps   Neuropathy   Palpitations  Pernicious anemia   Sleep apnea   Spider veins   Urinary incontinence   Vitamin D deficiency   Recurrent UTI   Urgency-Frequency syndrome   Candidiasis of skin   Morbid obesity   Obstructive Sleep Apnea on CPAP    Past Surgical History:    Appendectomy    cholecystectomy    Colonoscopy   Coronary angiography adult order  Heart cath left heart  DAJUAN   Knee replacement  Laparoscopic Nissen fundoplication   Tonsillectomy  Transposition ulnar nerve    Family History:    Mother - Alzheimer's Disease  Father - lung cancer     Social History:  The patient was accompanied to the ED by her daughter, Roya.  Smoking Status: Former smoker  Smokeless Tobacco: Never  Alcohol Use: Yes  Marital Status:        Review of Systems   Constitutional: Positive for fever.   Respiratory: Positive for shortness of breath. Negative for cough.    Cardiovascular: Negative for chest pain, palpitations and leg swelling.   Gastrointestinal: Positive for abdominal pain.   Musculoskeletal: Positive for back pain.   Neurological: Positive for dizziness and weakness.   Psychiatric/Behavioral: The patient is nervous/anxious.    All other systems reviewed and are negative.      Physical Exam     Patient Vitals for the past 24 hrs:   BP Temp Temp  [facility] Pulse Heart Rate Resp SpO2   01/27/20 1230 103/86 -- -- 90 90 14 93 %   01/27/20 1215 128/61 -- -- 85 93 -- --   01/27/20 1115 132/80 -- -- 98 105 14 91 %   01/27/20 1100 (!) 125/103 -- -- 141 103 13 92 %   01/27/20 1045 (!) 151/86 -- -- 110 116 20 92 %   01/27/20 1030 (!) 140/76 -- -- 109 105 17 93 %   01/27/20 1010 (!) 140/67 100.8  F (38.2  C) Oral -- 104 18 93 %       Physical Exam  General: Patient is awake, alert and interactive when I enter the room  Head: The scalp, face, and head appear normal  Eyes: The pupils are equal, round, and reactive to light. Conjunctivae and sclerae are normal  ENT: External acoustic canals are normal. The oropharynx is normal without erythema. Uvula is in the midline  Neck: Normal range of motion. No anterior cervical lymphadenopathy noted  CV: tachycardiac and irregular. S1/S2. No murmurs.   Resp: Lungs are clear without wheezes or rales. No respiratory distress.   GI: Abdomen is soft, no rigidity, guarding, or rebound. No distension. No tenderness to palpation in any quadrant.     MS: Normal tone. Joints grossly normal without effusions. No asymmetric leg swelling, calf or thigh tenderness.    Skin: No rash or lesions noted. Normal capillary refill noted  Neuro: Speech is normal and fluent. Face is symmetric. Moving all extremities.   Psych:  Normal affect.  Appropriate interactions.    Emergency Department Course     ECG:  Indication: Shortness of breath   Completed at 1013.  Read at 1101.   Atrial fibrillation with RVR   Left axis deviation   Possible anterior infarct, age undetermined   ST & T wave abnormality, consider lateral ischemia   Abnormal ECG  Rate 106 bpm. CO interval *. QRS duration 102. QT/QTc 322/427. P-R-T axes * -30 149.    ECG:  Indication: Shortness of breath; repeat ECG  Completed at 1420.  Read at 1422.   Atrial Fibrillation   Anterolateral infarct, age undetermined  Abnormal ECG   Rate 74 bpm. CO interval *. QRS duration 102. QT/QTc 372/412. P-R-T  axes * -27 -22.     Imaging:  Radiology findings were communicated with the patient, family and Admitting MD who voiced understanding of the findings.    XR Chest 2 Views  IMPRESSION: PA and lateral views of the chest. Lungs are clear. Heart  is normal in size. No effusions are evident. No pneumothorax.  Report per radiology     CT Abdomen Pelvis w Contrast  IMPRESSION: No acute changes in the abdomen or pelvis to account for  patient's symptoms. No bowel obstruction, diverticulitis or evidence  of colitis.  Report per radiology     Laboratory:  Laboratory findings were communicated with the patient, family and Admitting MD who voiced understanding of the findings.    CBC: WBC 17.8 (H) o/w WNL. (HGB 15.2, )   BMP: Glc 182 (H) o/w WNL (Creatinine 0.79)     D-Dimer: 0.3   Troponin (Collected 1025): <0.015    BNP: 957   Lactic Acid: 1.7    UA: Yellow, clear urine. Ketones urine 10, Blood urine trace, bacteria urine few, mucous urine present o/w WNL   Urine Culture Aerobic Bacterial: Pending    INR: 2.23 (H)    Blood cultures x2: Pending     Influenza A/B Antigen: negative for influenza A and B     Interventions:  1253 NS Bolus 500mL IV     Emergency Department Course:  Past medical records, nursing notes, and vitals reviewed.    1043 I performed an exam of the patient as documented above.     EKG obtained in the ED, see results above.   IV was inserted and blood was drawn for laboratory testing, results above.  The patient's nose was swabbed and this sample was sent for influenza antigen, findings above.    The patient provided a urine sample here in the emergency department. This was sent for laboratory testing, findings above.  The patient was sent for a CXR, CT Abdomen Pelvis w Contrast while in the emergency department, results above.     1320 I rechecked the patient and discussed the results of her workup thus far. Patient is 95% on room air. She states she feels the same as she did when she presented this  morning.     1425 I spoke with Silvia Wright PA-C for Dr. Wilcox of the Hospitalist service regarding patient's presentation, findings, and plan of care.    1555 Patient rechecked and updated.  Patient is now declining admission. Will discharge to home with close return precautions.     Findings and plan explained to the Patient. Patient discharged home with instructions regarding supportive care, medications, and reasons to return. The importance of close follow-up was reviewed.     I personally reviewed the laboratory and imaging results with the Patient and daughter and answered all related questions prior to admission.     Impression & Plan     Medical Decision Making:  Patient is a 79-year-old female with past medical history of recurrent UTI, congestive heart failure, chronic A. fib currently anticoagulated on Coumadin, type 2 diabetes and fibromyalgia who presents to the emergency department with generalized weakness and shortness of breath.  Upon initial evaluation she is hemodynamically stable.  She is on 2 L of oxygen oxygenating in the mid 90s when I initially evaluated her but this was titrated down during my care and she was able to maintain her oxygen saturations.  Broad work-up was initiated in the emergency department looking into the cause of the patient's shortness of breath and generalized fatigue.  EKG does initially show atrial fibrillation with RVR however the her rate improved with fluid resuscitation and on repeat EKG she is well rate controlled without any acute signs of ischemia.  Troponin was negative in the emergency department.  Chest x-ray was obtained which did not show any acute signs of pneumonia, pleural effusion, pulmonary edema, widened mediastinum or pneumothorax.  Patient did have some abdominal pain as well in the emergency department which was evaluated with a CT scan.  Thankfully this did not show any acute pathology.  Additionally the patient's urine did not show any signs  of infection here today.  Flu swab was negative.  Dimer was negative.  She did have an elevated white blood cell count but other than that her blood work was very reassuring.  Patient had an overall bland work-up in the emergency department however she was initially concerned about going home due to her ongoing dyspnea.  I believe it was reasonable for her to be admitted to observation overnight for further evaluation and treatment.  However after waiting in the emergency department for some time she changed her mind and stated that she would like to go home and would no longer like to be admitted.  We discussed the risks and benefits of her going home as we do not know the full extent of her dyspnea at this time.  The patient understands the risks and still would like to go home.  She states that she has ample support from multiple people in her life that can assist her as she is on the mend.  I discussed reasons to return to the emergency department.  All questions were answered.     Diagnosis:    ICD-10-CM    1. Generalized muscle weakness M62.81    2. Other fatigue R53.83    3. Dyspnea, unspecified type R06.00        Disposition:  Discharged to home.     Scribe Disclosure:  I, Savanah Carlson, am serving as a scribe at 10:35 AM on 1/27/2020 to document services personally performed by Kolby Lobato MD based on my observations and the provider's statements to me. 1/27/2020   [facility] EMERGENCY DEPARTMENT       Kolby Lobato MD  01/27/20 6922

## 2020-01-27 NOTE — PHARMACY-ADMISSION MEDICATION HISTORY
Admission medication history interview status for this patient is complete. See [facility] admission navigator for allergy information, prior to admission medications and immunization status.     Medication history interview source(s):Patient interview and list from [place]  Medication history resources (including written lists, pill bottles, clinic record):med list from [facility] [place] [phone] and verified with home care RN, Raj [phone]    Changes made to PTA medication list:  Added: biotin, rantitidine, super b complex  Deleted: tylenol, ceftin, ibuprofen, lorazepam, nystatin(on med list but not taking per patient), pravachol(makes her dizzy so requested to stop taking)  Changed: vitamin d from 50,000 weekly to 5000 daily    Actions taken by pharmacist (provider contacted, etc):None     Additional medication history information: Patient only takes medications once at bedtime set up by her home care RN every two weeks.    Medication reconciliation/reorder completed by provider prior to medication history?  No     Do you take OTC medications (eg tylenol, ibuprofen, fish oil, eye/ear drops, etc)? No     For patients on insulin therapy: No      Prior to Admission medications    Medication Sig Last Dose Taking? Auth Provider   B Complex-C (SUPER B COMPLEX PO) Take 1 tablet by mouth At Bedtime 1/26/2020 at Unknown time Yes Unknown, Entered By History   Biotin 5000 MCG TABS Take 5,000 mcg by mouth At Bedtime 1/26/2020 at Unknown time Yes Unknown, Entered By History   cholecalciferol (VITAMIN D3) 5000 units (125 mcg) capsule Take 5,000 Units by mouth At Bedtime 1/26/2020 at Unknown time Yes Unknown, Entered By History   glipiZIDE (GLUCOTROL XL) 2.5 MG 24 hr tablet Take 2.5 mg by mouth At Bedtime 1/26/2020 at Unknown time Yes Unknown, Entered By History   nitroFURantoin macrocrystal (MACRODANTIN) 100 MG capsule Take 1 capsule (100 mg) by mouth At Bedtime Start after the you finish the Cefuroxime  1/26/2020 at Unknown time Yes Patti Suárez MD   oxybutynin (DITROPAN) 5 MG tablet Take 5 mg by mouth At Bedtime Clarified as immediate release with [facility]./Patient  1/26/2020 at Unknown time Yes Doctor, None, MD   ranitidine (ZANTAC) 150 MG tablet Take 150 mg by mouth At Bedtime 1/26/2020 at Unknown time Yes Unknown, Entered By History   warfarin ANTICOAGULANT (COUMADIN) 2.5 MG tablet Take 3.75mg(1 1/2 tablets) Mon/Wed/Fri and 2.5mg (1 tablet) the rest of the days of the week. Or as directed by the Anticoagulation Clinic 1/26/2020 at Unknown time Yes Patti Suárez MD   ACE/ARB/ARNI NOT PRESCRIBED (INTENTIONAL) Please choose reason not prescribed, below   Patti Suárez MD   BETA BLOCKER NOT PRESCRIBED (INTENTIONAL) Beta Blocker not prescribed intentionally due to Hypotension (SBP < 90)   Patti Suárez MD   order for DME Oxygen 2 Li/min  at night bleed with CPAP   Duc Olivo MD

## 2020-01-27 NOTE — ED NOTES
Bed: ED24  Expected date: 1/27/20  Expected time:   Means of arrival: Ambulance  Comments:  Billie Hancock

## 2020-01-27 NOTE — H&P
[facility]  Observation Unit  H & P      Patient Name: Savanna Rehman MRN# [id number]   Age: 79 year old YOB: 1940     Date of Admission:1/27/2020    Primary care provider: Patti Suárez  Date of Service: 1/27/2020         Assessment and Plan:   Savanna Rehman is a 79 year old female with a history of Atrial Fibrillation, DM type 2, Peripheral Neuropathy, Diastolic CHF, Frequent UTI, HLD, Obesity, CRISTIANA who presents to the ED 2/2 generalized weakness, shortness of breath and fever.    Leukocytosis, Fever  Generalized Weakness - likely 2/2 acute viral process.  Pt presents with one month of not feeling well with recent UTI now on prophylactic Macrobid.  Recently with rhinorrhea and intermittent non productive cough, abdominal discomfort.  One episode of diarrhea last night and an episode of emesis this morning and one day of increased shortness of breath.  ED work up did reveal a fever of 100.8, 94% on room air and WBC 17.8.  BMP, Lactic Acid, UA and Influenza negative.  CXR and CT abd/pelvis negative for acute pathology.   - IVF hydration  - send influenza PCR and procalcitonin  - follow up urine and blood cultures     - if diarrhea recurs, will send enteric panel and Cdiff given recent antibiotic use  - monitor on continuous pulse oximetry  - continue pta prophylactic Macrobid  - PT consult in am    Chronic Atrial Fibrillation - HR elevated to 104 on arrival and improved to 80s after IVF hydration.  INR 2.23.  - monitor on telemetry  - pharmacy consulted for warfarin dosing    Chronic Diastolic CHF  HTN  HLD - most recent echo 9/2018 with EF 50-55%, dilated RA/LA, mild AR.  Does not appear fluid overloaded.  CXR without any effusions.  BNP and Troponin negative.    CRISTIANA - non compliant with cpap.  Monitor on continuous pulse oximetry    DM Type 2 with Peripheral Neuropathy - last A1C 11/2019 6.4.  Blood sugar on admission 182.    - hold pta Glipizide for now due to  recent emesis.  Resume when taking po well.  - start ISS protocol      CODE: Full  Diet/IVF: regular, NS  DVT ppx: on warfarin    Silvia Redmond MS CASTILLO  Physician Assistant   Hospitalist Service  Pager: [phone] Addendum:  Notified by ED staff that patient no longer wants to be admitted to the hospital.             Chief Complaint:   Shortness of breath, generalized weakness.         HPI:   79 year old female with a history of Atrial Fibrillation, DM type 2, Peripheral Neuropathy, Diastolic CHF, Frequent UTI, HLD, Obesity, CRISTIANA who presents to the ED 2/2 generalized weakness, shortness of breath and fever.    Patient reports she has not felt well for the past one month.  She reports she was diagnosed with a UTI on 1/16/20 and has finished her course of antibiotics and yesterday started Nitrofurantoin for prophylaxis due to her history of recurrent UTI.  She has had increased generalized weakness, fatigue.  She reports abdominal discomfort for the past several days.  Yesterday she had an episode of diarrhea which was non bloody.  This morning, she awoke around 430 am with shortness of breath and feeling like she could not take a deep breath.  She denies chest pain, shortness of breath or peripheral edema.  She is not compliant with her cpap.  She reports recent rhinorrhea and non productive cough.  She reports the cough is near her baseline.  She reports an episode of emesis this morning.  Denies any dysuria or rashes.  She reports she feels improved since arriving to the ED.  She lives in an [place] and called EMS to present to the ED.    ED work up revealed patient temp 100.8, HR , on 2 liters of oxygen on arrival which was turned to off with saturations 94%.  Laboratory work up revealed WB 17.8, INR 2.23 with otherwise normal BMP, BNP, Troponin, lactic acid, ddimer, influenza.  UA does not appear infected.  CXR revealed clear lungs.  CT abd/pelvis revealed No acute changes in the abdomen or pelvis.   Patient received IVF and admitted for further management.         Past Medical History:     Past Medical History:   Diagnosis Date     Anemia     Iron Deficiency anemia     Atrial fibrillation (H)      CAD (coronary artery disease)     non-obstructive     Chronic pain     neck, low back, legs     Congestive heart failure (H)      Degenerative disk disease      Fibromyalgia      Gastro-oesophageal reflux disease      Gout      Hiatal hernia      Mumps      Neuropathy      Palpitations      Pernicious anemia      Sleep apnea     uses CPAP.     Spider veins      Urinary incontinence      Vitamin D deficiency           Past Surgical History:     Past Surgical History:   Procedure Laterality Date     APPENDECTOMY       CHOLECYSTECTOMY       COLONOSCOPY  3/15/2011     CORONARY ANGIOGRAPHY ADULT ORDER       HEART CATH LEFT HEART CATH  16    medication management     HYSTERECTOMY TOTAL ABDOMINAL       Knee replacement NOS Left      LAPAROSCOPIC NISSEN FUNDOPLICATION N/A 2015    Procedure: LAPAROSCOPIC NISSEN FUNDOPLICATION;  Surgeon: Armando Ansrai MD;  Location: [facility] OR     TONSILLECTOMY       TRANSPOSITION ULNAR NERVE (ELBOW)            Social History:     Social History     Socioeconomic History     Marital status:      Spouse name: Not on file     Number of children: Not on file     Years of education: Not on file     Highest education level: Not on file   Occupational History     Not on file   Social Needs     Financial resource strain: Not on file     Food insecurity:     Worry: Not on file     Inability: Not on file     Transportation needs:     Medical: Not on file     Non-medical: Not on file   Tobacco Use     Smoking status: Former Smoker     Packs/day: 0.00     Types: Cigarettes     Last attempt to quit: 2014     Years since quittin.4     Smokeless tobacco: Never Used   Substance and Sexual Activity     Alcohol use: Yes     Comment: socially     Drug use: Never     Sexual activity: Not  Currently   Lifestyle     Physical activity:     Days per week: Not on file     Minutes per session: Not on file     Stress: Not on file   Relationships     Social connections:     Talks on phone: Not on file     Gets together: Not on file     Attends [employer] service: Not on file     Active member of club or organization: Not on file     Attends meetings of clubs or organizations: Not on file     Relationship status: Not on file     Intimate partner violence:     Fear of current or ex partner: Not on file     Emotionally abused: Not on file     Physically abused: Not on file     Forced sexual activity: Not on file   Other Topics Concern     Parent/sibling w/ CABG, MI or angioplasty before 65F 55M? Not Asked   Social History Narrative     Not on file          Family History:     Family History   Problem Relation Age of Onset     Alzheimer Disease Mother      Lung Cancer Father      No Known Problems Brother      No Known Problems Brother      No Known Problems Brother      Unknown/Adopted No family hx of           Allergies:      Allergies   Allergen Reactions     Augmented Betamethasone Diprop [Betamethasone] Other (See Comments)     Severe yeast infection     Petroleum Jelly [Petrolatum] Anaphylaxis     Rash and swelling     Shellfish-Derived Products Anaphylaxis     Tongue swelling     Aspirin Swelling     tiongue swelling     Bacitracin      Rash swelling     Coumadin [Warfarin] Swelling     Leg swelling     Darvon [Propoxyphene] Swelling     Throat closes     Dilaudid [Hydromorphone]      Levaquin [Levofloxacin] Swelling     Tongue swelling     Neosporin [Neomycin-Polymyx-Gramicid] Swelling     rash     Nitrofurantoin      GI upset     Oxycodone      Severe itching     Percodan [Oxycodone-Aspirin]      Severe itching     Tramadol      Vicodin [Hydrocodone-Acetaminophen]      Severe itching       Xarelto [Rivaroxaban]      Adhesive Tape Rash     Band aids      Codeine Rash          Medications:     Prior to  Admission medications    Medication Sig Last Dose Taking? Auth Provider   ACE/ARB/ARNI NOT PRESCRIBED (INTENTIONAL) Please choose reason not prescribed, below   Patti Suárez MD   ACETAMINOPHEN PO Take 500 mg by mouth   Reported, Patient   BETA BLOCKER NOT PRESCRIBED (INTENTIONAL) Beta Blocker not prescribed intentionally due to Hypotension (SBP < 90)   Patti Suárez MD   cefuroxime (CEFTIN) 500 MG tablet Take 1 tablet (500 mg) by mouth 2 times daily   Patti Suárez MD   glipiZIDE (GLUCOTROL XL) 2.5 MG 24 hr tablet Take 1 tablet (2.5 mg) by mouth daily   Patti Suárez MD   Ibuprofen (ADVIL PO) Take 400 mg by mouth every 8 hours as needed for moderate pain   Unknown, Entered By History   LORazepam (ATIVAN) 0.5 MG tablet Take 1 tablet (0.5 mg) by mouth every 4 hours as needed for anxiety or other (chest pain)  Patient taking differently: Take 0.5 mg by mouth as needed for anxiety or other (chest pain)    Shai Lovell MD   nitroFURantoin macrocrystal (MACRODANTIN) 100 MG capsule Take 1 capsule (100 mg) by mouth At Bedtime Start after the you finish the Cefuroxime   Patti Suárez MD   nystatin (MYCOSTATIN) 960846 UNIT/ML suspension Take 5 mLs (500,000 Units) by mouth 4 times daily   Ramesh Fang MD   order for DME Oxygen 2 Li/min  at night bleed with CPAP   Duc Olivo MD   oxybutynin (DITROPAN) 5 MG tablet Take 1 tablet (5 mg) by mouth daily Clarified as immediate release with [facility]./Patient   Doctor, Jesus, MD   pravastatin (PRAVACHOL) 20 MG tablet Take 1 tablet (20 mg) by mouth At Bedtime   Patti Suárez MD   vitamin D3 (CHOLECALCIFEROL) 1.25 MG (59004 UT) capsule Take 1 capsule (50,000 Units) by mouth every 7 days   Patti Suárez MD   warfarin ANTICOAGULANT (COUMADIN) 2.5 MG tablet Take 3.75mg(1 1/2 tablets) Mon/Wed/Fri and 2.5mg (1 tablet) the rest of the days of the  week. Or as directed by the Anticoagulation Clinic   Patti Suárez MD          Review of Systems:   A complete ROS was performed and is negative other than what is stated in the HPI.       Physical Exam:   Blood pressure 122/65, pulse 91, temperature 100.8  F (38.2  C), temperature source Oral, resp. rate 23, SpO2 94 %, not currently breastfeeding. on room air  General: Alert, interactive, NAD, sitting up in bed, pleasant and talkative  HEENT: AT/NC, sclera anicteric, PERRL, EOMI,  Chest/Resp: clear to auscultation bilaterally, no crackles or wheezes  Heart/CV: irregular rate and rhythm, no murmur  Abdomen/GI: Soft, mild diffuse tenderness, nondistended. +BS.  No rebound or guarding.  Extremities/MSK: No LE edema  Skin: Warm and dry  Neuro: Alert & oriented x 3, no focal deficits, moves all extremities equally         Labs:   ROUTINE ICU LABS (Last four results)  CMP  Recent Labs   Lab 01/27/20  1025      POTASSIUM 4.3   CHLORIDE 102   CO2 28   ANIONGAP 7   *   BUN 19   CR 0.79   GFRESTIMATED 72   GFRESTBLACK 84   SAUL 9.4     CBC  Recent Labs   Lab 01/27/20  1025   WBC 17.8*   RBC 5.00   HGB 15.2   HCT 45.8   MCV 92   MCH 30.4   MCHC 33.2   RDW 13.3        INR  Recent Labs   Lab 01/27/20  1025 01/22/20   INR 2.23* 2.3*     Arterial Blood GasNo lab results found in last 7 days.       Imaging/Procedures:     Results for orders placed or performed during the hospital encounter of 01/27/20   XR Chest 2 Views    Narrative    CHEST TWO VIEWS January 27, 2020 12:08 PM     HISTORY: Shortness of breath.    COMPARISON: Chest x-ray 4/12/2017.      Impression    IMPRESSION: PA and lateral views of the chest. Lungs are clear. Heart  is normal in size. No effusions are evident. No pneumothorax.    ALEXA THOMPSON MD   CT Abdomen Pelvis w Contrast    Narrative    CT ABDOMEN/PELVIS WITH CONTRAST January 27, 2020 11:58 AM     HISTORY: Lower abdominal tenderness and back pain, vomiting.      COMPARISON: CT abdomen/pelvis 10/4/2019.    TECHNIQUE: Axial images from the lung bases to the symphysis are  performed with additional coronal reformatted images. 100 mL of Isovue  370 are given intravenously. Radiation dose for this scan was reduced  using automated exposure control, adjustment of the mA and/or kV  according to patient size, or iterative reconstruction technique.    FINDINGS: Atelectatic lung base changes are noted. Lung bases are  otherwise clear.    Abdomen: The liver, spleen, fatty replaced pancreas, adrenal glands  and kidneys are unremarkable. No hydronephrosis or renal calculi.  Small retroperitoneal nodes are present. None are significantly  enlarged by CT criteria. Prior cholecystectomy. Aorta demonstrates  scattered calcified plaque. No aneurysm or dissection. The bowel is  normal in caliber without obstruction or diverticulitis. Appendix not  visualized. Postsurgical changes are noted about the GE junction.    Pelvis: Bladder is decompressed. The rectum is unremarkable. No  enlarged pelvic lymph nodes or free fluid. Uterus not identified.  Degenerative spine changes are present.      Impression    IMPRESSION: No acute changes in the abdomen or pelvis to account for  patient's symptoms. No bowel obstruction, diverticulitis or evidence  of colitis.    ALEXA THOMPSON MD

## 2020-01-27 NOTE — ED TRIAGE NOTES
Pt presents to ED via EMS from home. Pt states that she was dx with UTI three weeks ago and was tx with abx which she has finished. Pt states that she does not feel like she ever got better. Pt called EMS today for weakness, sob, and feeling feverish. ABC intact. A/O x4.  per EMS.

## 2020-01-27 NOTE — ED NOTES
Pt states that she no longer wants to be admitted. Pt states that she feels she can ambulate with a walker at home. Pt is eating in room. Pt is being trialed off oxygen. Pt satting at 89-91 on RA at rest.Pt and MD aware of this.

## 2020-01-27 NOTE — ED NOTES
[facility]  ED Nurse Handoff Report    Savanna Rehman is a 79 year old female   ED Chief complaint: Generalized Weakness; Shortness of Breath; and Fever  . ED Diagnosis:   Final diagnoses:   Generalized muscle weakness   Other fatigue   Dyspnea, unspecified type     Allergies:   Allergies   Allergen Reactions     Augmented Betamethasone Diprop [Betamethasone] Other (See Comments)     Severe yeast infection     Petroleum Jelly [Petrolatum] Anaphylaxis     Rash and swelling     Shellfish-Derived Products Anaphylaxis     Tongue swelling     Aspirin Swelling     tiongue swelling     Bacitracin      Rash swelling     Coumadin [Warfarin] Swelling     Leg swelling     Darvon [Propoxyphene] Swelling     Throat closes     Dilaudid [Hydromorphone]      Levaquin [Levofloxacin] Swelling     Tongue swelling     Neosporin [Neomycin-Polymyx-Gramicid] Swelling     rash     Nitrofurantoin      GI upset     Oxycodone      Severe itching     Percodan [Oxycodone-Aspirin]      Severe itching     Tramadol      Vicodin [Hydrocodone-Acetaminophen]      Severe itching       Xarelto [Rivaroxaban]      Adhesive Tape Rash     Band aids      Codeine Rash       Code Status: Full Code  Activity level - Baseline/Home:  Independent. Activity Level - Current:   assisit x2, possible lift Lift room needed: No. Bariatric: No   Needed: No   Isolation: No. Infection: Not Applicable.     Vital Signs:   Vitals:    01/27/20 1345 01/27/20 1400 01/27/20 1415 01/27/20 1430   BP: 102/75 96/56  122/65   Pulse: 89 87 82 91   Resp: 23 22 19 23   Temp:       TempSrc:       SpO2: 91% 95% 94% 94%       Cardiac Rhythm:  ,   Cardiac  Cardiac Rhythm: Atrial fibrillation(pt has known hx afib. rate  in ED)  Pain level:    Patient confused: No. Patient Falls Risk: Yes.   Elimination Status: Has voided - purewick placed  Patient Report - Initial Complaint: weakness, sob. Focused Assessment:      Cardiac Cardiac Monitoring - EKG Monitoring:  Yes  Cardiac Regularity: Irregular  Cardiac Rhythm: Atrial fibrillation (pt has known hx afib. rate  in ED) LL       10:15 Genitourinary Genitourinary - Genitourinary Comment: pt finished course of abx for her UTI  LL     10:15 Musculoskeletal Musculoskeletal - Musculoskeletal WDL: -WDL except  General Mobility: generalized weakness  Musculoskeletal Comment: pt states that she has been feeling unwell since she was dx with UTI 3 wks ago.          Tests Performed:   XR Chest 2 Views   Final Result   IMPRESSION: PA and lateral views of the chest. Lungs are clear. Heart   is normal in size. No effusions are evident. No pneumothorax.      ALEXA THOMPSON MD      CT Abdomen Pelvis w Contrast   Final Result   IMPRESSION: No acute changes in the abdomen or pelvis to account for   patient's symptoms. No bowel obstruction, diverticulitis or evidence   of colitis.      ALEXA THOMPSON MD        Labs Ordered and Resulted from Time of ED Arrival Up to the Time of Departure from the ED   CBC WITH PLATELETS DIFFERENTIAL - Abnormal; Notable for the following components:       Result Value    WBC 17.8 (*)     Absolute Neutrophil 16.0 (*)     All other components within normal limits   BASIC METABOLIC PANEL - Abnormal; Notable for the following components:    Glucose 182 (*)     All other components within normal limits   ROUTINE UA WITH MICROSCOPIC - Abnormal; Notable for the following components:    Ketones Urine 10 (*)     Blood Urine Trace (*)     Bacteria Urine Few (*)     Mucous Urine Present (*)     All other components within normal limits   INR - Abnormal; Notable for the following components:    INR 2.23 (*)     All other components within normal limits   LACTIC ACID WHOLE BLOOD   D DIMER QUANTITATIVE   NT PROBNP INPATIENT   TROPONIN I   URINE CULTURE AEROBIC BACTERIAL   BLOOD CULTURE   BLOOD CULTURE   INFLUENZA A/B ANTIGEN     Treatments provided: fluids, monitoring  Family Comments: no family present - daughter was here,  went home  OBS brochure/video discussed/provided to patient:  Yes  ED Medications:   Medications   iopamidol (ISOVUE-370) solution 500 mL (100 mLs Intravenous Given 1/27/20 1153)   Saline Flush (65 mLs Other Given 1/27/20 1153)   0.9% sodium chloride BOLUS (500 mLs Intravenous New Bag 1/27/20 1253)     Drips infusing:  No  For the majority of the shift, the patient's behavior Green. Interventions performed were rounding.     Severe Sepsis OR Septic Shock Diagnosis Present: No      ED Nurse Name/Phone Number: Shannon Lawrence RN,   2:50 PM    RECEIVING UNIT ED HANDOFF REVIEW    Above ED Nurse Handoff Report was reviewed: yes  Reviewed by: Mona Julien RN on January 27, 2020 at 3:59 PM

## 2020-01-27 NOTE — ED AVS SNAPSHOT
[facility]  [address]  [facility] [address]  Phone:  [phone]  Fax:  [phone]                                    Savanna Rehman   MRN: [id number]    Department:  [facility]   Date of Visit:  1/27/2020           After Visit Summary Signature Page    I have received my discharge instructions, and my questions have been answered. I have discussed any challenges I see with this plan with the nurse or doctor.    ..........................................................................................................................................  Patient/Patient Representative Signature      ..........................................................................................................................................  Patient Representative Print Name and Relationship to Patient    ..................................................               ................................................  Date                                   Time    ..........................................................................................................................................  Reviewed by Signature/Title    ...................................................              ..............................................  Date                                               Time          22EPIC Rev 08/18

## 2020-01-28 ENCOUNTER — TELEPHONE (OUTPATIENT)
Dept: INTERNAL MEDICINE | Facility: CLINIC | Age: 80
End: 2020-01-28

## 2020-01-28 LAB
BACTERIA SPEC CULT: NO GROWTH
INTERPRETATION ECG - MUSE: NORMAL
INTERPRETATION ECG - MUSE: NORMAL
Lab: NORMAL
SPECIMEN SOURCE: NORMAL

## 2020-01-28 NOTE — TELEPHONE ENCOUNTER
Patient is calling because she was seen in the ED 1/27/20. She is thinking she may had a reaction to nitrofurantoin or a complex b vitamin. She would like to be seen on Friday this week 1/31/20 if possible.

## 2020-01-29 ENCOUNTER — TELEPHONE (OUTPATIENT)
Dept: INTERNAL MEDICINE | Facility: CLINIC | Age: 80
End: 2020-01-29

## 2020-01-29 DIAGNOSIS — I48.20 CHRONIC ATRIAL FIBRILLATION (H): ICD-10-CM

## 2020-01-29 DIAGNOSIS — Z79.01 LONG TERM (CURRENT) USE OF ANTICOAGULANTS: ICD-10-CM

## 2020-01-29 DIAGNOSIS — I48.21 PERMANENT ATRIAL FIBRILLATION (H): ICD-10-CM

## 2020-01-29 LAB — INR PPP: 2.5 (ref 0.9–1.1)

## 2020-01-29 NOTE — TELEPHONE ENCOUNTER
ANTICOAGULATION MANAGEMENT     Patient Name:  Savanna Rehman  Date:  2020    ASSESSMENT /SUBJECTIVE:      Today's INR result of 2.5 is therapeutic. Goal INR of 2.0-3.0      Warfarin dose taken: Warfarin taken as previously instructed    Diet: No new diet changes affecting INR    Medication changes/ interactions: No new medications/supplements affecting INR    Previous INR: Therapeutic     S/S of bleeding or thromboembolism: No    New injury or illness:  No    Upcoming surgery, procedure or cardioversion:  No    Additional findings: Patient stopped Macrodantin as she feels it is causing her current symptoms per home care. She has appt with PCP on Friday.    PLAN:    Spoke with Nurse Raj regarding INR result and instructed:     Warfarin Dosing Instructions: Continue your current warfarin dose    Instructed patient to follow up no later than: 1 week  Orders given to  Homecare nurse/facility to recheck    Education provided: Yes: Medication changes and INR interactions      Nurse Raj verbalizes understanding and agrees to warfarin dosing plan.    Instructed to call the Anticoagulation Clinic for any changes, questions or concerns. (#[phone])        OBJECTIVE:  INR   Date Value Ref Range Status   2020 2.5 (A) 0.90 - 1.10 Final             Anticoagulation Summary  As of 2020    INR goal:   2.0-3.0   TTR:   86.0 % (2.9 mo)   INR used for dosin.5 (2020)   Warfarin maintenance plan:   3.75 mg (2.5 mg x 1.5) every Mon, Wed, Fri; 2.5 mg (2.5 mg x 1) all other days   Full warfarin instructions:   3.75 mg every Mon, Wed, Fri; 2.5 mg all other days   Weekly warfarin total:   21.25 mg   Plan last modified:   Azul Whitaker RN (10/23/2019)   Next INR check:   2020   Priority:   High   Target end date:   Indefinite    Indications    Permanent atrial fibrillation [I48.21]  Chronic atrial fibrillation [I48.20]  Long term (current) use of anticoagulants [Z79.01]              Anticoagulation Episode Summary     INR check location:       Preferred lab:   EXTERNAL LAB    Send INR reminders to:   NIKKI CHASE    Comments:   Home care       Anticoagulation Care Providers     Provider Role Specialty Phone number    Patti Suárez MD [facility] Internal Medicine [phone]

## 2020-01-31 ENCOUNTER — OFFICE VISIT (OUTPATIENT)
Dept: INTERNAL MEDICINE | Facility: CLINIC | Age: 80
End: 2020-01-31
Payer: COMMERCIAL

## 2020-01-31 ENCOUNTER — TELEPHONE (OUTPATIENT)
Dept: INTERNAL MEDICINE | Facility: CLINIC | Age: 80
End: 2020-01-31

## 2020-01-31 VITALS
DIASTOLIC BLOOD PRESSURE: 85 MMHG | SYSTOLIC BLOOD PRESSURE: 132 MMHG | BODY MASS INDEX: 40.29 KG/M2 | RESPIRATION RATE: 16 BRPM | TEMPERATURE: 98.2 F | WEIGHT: 265 LBS | OXYGEN SATURATION: 99 % | HEART RATE: 52 BPM

## 2020-01-31 DIAGNOSIS — H81.10 BENIGN PAROXYSMAL POSITIONAL VERTIGO, UNSPECIFIED LATERALITY: Primary | ICD-10-CM

## 2020-01-31 DIAGNOSIS — N39.0 RECURRENT UTI: ICD-10-CM

## 2020-01-31 PROCEDURE — 99214 OFFICE O/P EST MOD 30 MIN: CPT | Performed by: INTERNAL MEDICINE

## 2020-01-31 NOTE — PATIENT INSTRUCTIONS
Plan:  1. Keep the appointment with dr Wilson   2. Physical therapy for vertigo --  [phone]  3. No changes in meds

## 2020-01-31 NOTE — PROGRESS NOTES
Dr Lauren's note      Patient's instructions / PLAN:                                                        Plan:  1. Keep the appointment with dr Wilson   2. Physical therapy for vertigo --  [phone]  3. No changes in meds    ASSESSMENT & PLAN:                                                      Ms. Corrales went to  ER on January 27, complaining of generalized muscle weakness. Initially she was hypoxemic, but before she left the ER she was able to maintain her O2 saturation. Initial EKG showed A fib with RVR, but later the HR slowed down with the IV fluids. She's already on anticoagulants. XCR did not show any acute pathology, negative influenza, negative D dimer. The pt was discharged home. I reviewed the tests results, she did have a WBC at 17.8 with L shift, 0 WBC in urine. Apparently all her symptoms were related with nitrofurantoin. Overall she feels much better since she stopped it. Unfortunately she has recurrent UTIs and multiple antibiotic allergies.        (H81.10) Benign paroxysmal positional vertigo, unspecified laterality  (primary encounter diagnosis)  Comment: Not controlled   Plan: PHYSICAL THERAPY REFERRAL          (N39.0) Recurrent UTI  Comment:   Plan:      Chief complaint:                                                      ER f/u    SUBJECTIVE:                                                    History of present illness:    Urinary  -- she is feeling better, but her stomach is sore   -- I advised her to use baby wipes to cleanse herself, only use soap 1 -2 times a week only.  -- uses adult pads  -- SOB, her O2 could not bring her to normal  -- She thought concrete was being pulled out of her bladder     D/V  -- x few months  -- she cannot mix foods  -- after she eats she vomits or diarrhea or both at the same time    Vertigo  -- x 8 months, says she feels dizzy right now   -- when she moves her head, she needs to hold herself up       Diabetes Follow-up      How often are you checking  your blood sugar? Not at all    What concerns do you have today about your diabetes? None and Other: Dizziness     Do you have any of these symptoms? (Select all that apply)  Numbness in feet and Blurry vision    Have you had a diabetic eye exam in the last 12 months? No        BP Readings from Last 2 Encounters:   01/27/20 115/69   01/16/20 124/82     Hemoglobin A1C (%)   Date Value   11/16/2019 6.4 (H)   07/02/2019 6.8 (A)     LDL Cholesterol Calculated (mg/dL)   Date Value   11/16/2019 113 (H)   07/02/2019 90.4         Hyperlipidemia Follow-Up      Are you regularly taking any medication or supplement to lower your cholesterol?   No    Are you having muscle aches or other side effects that you think could be caused by your cholesterol lowering medication?  No      How many servings of fruits and vegetables do you eat daily?  0-1    On average, how many sweetened beverages do you drink each day (Examples: soda, juice, sweet tea, etc.  Do NOT count diet or artificially sweetened beverages)?   1    How many days per week do you exercise enough to make your heart beat faster? 3 or less    How many minutes a day do you exercise enough to make your heart beat faster? 9 or less    How many days per week do you miss taking your medication? 0    Review of Systems:                                                      ROS: negative for fever, chills, cough, wheezes, chest pain, shortness of breath, vomiting, abdominal pain, leg swelling     This document serves as a record of the services and decisions personally performed and made by Dr. Leonidas MD. It was created on their behalf by Figueroa Reynoso, a trained medical scribe. The creation of this document is based on the provider's statements to the medical scribe.  Figueroa Reynoso January 31, 2020 3:40 PM    OBJECTIVE:             Physical exam:  Blood pressure 132/85, pulse 52, temperature 98.2  F (36.8  C), resp. rate 16, weight 120.2 kg (265 lb), SpO2 99 %, not currently  breastfeeding.   NAD, appears comfortable  Skin: no rashes   Chest: clear to auscultation bilaterally, good respiratory effort  Heart: S1 S2, RRR, no mgr appreciated  Abdomen: soft, not tender,   Extremities: no edema,   Neurologic: A, Ox3, no focal signs appreciated    PMHx: reviewed  Past Medical History:   Diagnosis Date     Anemia     Iron Deficiency anemia     Atrial fibrillation (H)      CAD (coronary artery disease)     non-obstructive     Chronic pain     neck, low back, legs     Congestive heart failure (H)      Degenerative disk disease      Fibromyalgia      Gastro-oesophageal reflux disease      Gout      Hiatal hernia      Mumps      Neuropathy      Palpitations      Pernicious anemia      Sleep apnea     uses CPAP.     Spider veins      Urinary incontinence      Vitamin D deficiency       PSHx: reviewed  Past Surgical History:   Procedure Laterality Date     APPENDECTOMY       CHOLECYSTECTOMY       COLONOSCOPY  3/15/2011     CORONARY ANGIOGRAPHY ADULT ORDER       HEART CATH LEFT HEART CATH  12/30/16    medication management     HYSTERECTOMY TOTAL ABDOMINAL       Knee replacement NOS Left      LAPAROSCOPIC NISSEN FUNDOPLICATION N/A 2/4/2015    Procedure: LAPAROSCOPIC NISSEN FUNDOPLICATION;  Surgeon: Armando Ansari MD;  Location: [facility] OR     TONSILLECTOMY       TRANSPOSITION ULNAR NERVE (ELBOW)          Meds: reviewed  Current Outpatient Medications   Medication Sig Dispense Refill     ACE/ARB/ARNI NOT PRESCRIBED (INTENTIONAL) Please choose reason not prescribed, below       B Complex-C (SUPER B COMPLEX PO) Take 1 tablet by mouth At Bedtime       BETA BLOCKER NOT PRESCRIBED (INTENTIONAL) Beta Blocker not prescribed intentionally due to Hypotension (SBP < 90)       Biotin 5000 MCG TABS Take 5,000 mcg by mouth At Bedtime       glipiZIDE (GLUCOTROL XL) 2.5 MG 24 hr tablet Take 2.5 mg by mouth At Bedtime       oxybutynin (DITROPAN) 5 MG tablet Take 5 mg by mouth At Bedtime Clarified as immediate release with  Marcie./Patient        warfarin ANTICOAGULANT (COUMADIN) 2.5 MG tablet Take 3.75mg(1 1/2 tablets) Mon/Wed/Fri and 2.5mg (1 tablet) the rest of the days of the week. Or as directed by the Anticoagulation Clinic 126 tablet 0     cholecalciferol (VITAMIN D3) 5000 units (125 mcg) capsule Take 5,000 Units by mouth At Bedtime       order for DME Oxygen 2 Li/min  at night bleed with CPAP (Patient not taking: Reported on 1/31/2020)       ranitidine (ZANTAC) 150 MG tablet Take 150 mg by mouth At Bedtime         Soc Hx: reviewed  Fam Hx: reviewed          Patti Lauren MD  Internal Medicine    The information in this document, created by the medical scribe for me, accurately reflects the services I personally performed and the decisions made by me. I have reviewed and approved this document for accuracy prior to leaving the patient care area.  January 31, 2020 4:02 PM

## 2020-02-02 LAB
BACTERIA SPEC CULT: NO GROWTH
BACTERIA SPEC CULT: NO GROWTH
Lab: NORMAL
SPECIMEN SOURCE: NORMAL
SPECIMEN SOURCE: NORMAL

## 2020-02-03 DIAGNOSIS — Z53.9 DIAGNOSIS NOT YET DEFINED: Primary | ICD-10-CM

## 2020-02-03 PROCEDURE — G0179 MD RECERTIFICATION HHA PT: HCPCS | Performed by: INTERNAL MEDICINE

## 2020-02-05 ENCOUNTER — TELEPHONE (OUTPATIENT)
Dept: INTERNAL MEDICINE | Facility: CLINIC | Age: 80
End: 2020-02-05

## 2020-02-05 DIAGNOSIS — I48.21 PERMANENT ATRIAL FIBRILLATION (H): ICD-10-CM

## 2020-02-05 DIAGNOSIS — I48.20 CHRONIC ATRIAL FIBRILLATION (H): ICD-10-CM

## 2020-02-05 DIAGNOSIS — Z79.01 LONG TERM (CURRENT) USE OF ANTICOAGULANTS: ICD-10-CM

## 2020-02-05 DIAGNOSIS — E11.42 DIABETIC POLYNEUROPATHY ASSOCIATED WITH TYPE 2 DIABETES MELLITUS (H): Primary | ICD-10-CM

## 2020-02-05 LAB — INR PPP: 2.6 (ref 0.9–1.1)

## 2020-02-05 NOTE — TELEPHONE ENCOUNTER
ANTICOAGULATION MANAGEMENT     Patient Name:  Savanna Rehman  Date:  2020    ASSESSMENT /SUBJECTIVE:      Today's INR result of 2.6 is therapeutic. Goal INR of 2.0-3.0      Warfarin dose taken: Warfarin taken as previously instructed    Diet: No new diet changes affecting INR    Medication changes/ interactions: No new medications/supplements affecting INR    Previous INR: Therapeutic     S/S of bleeding or thromboembolism: No    New injury or illness:  No    Upcoming surgery, procedure or cardioversion:  No    Additional findings: None      PLAN:    Spoke with Nurse Raj regarding INR result and instructed:     Warfarin Dosing Instructions: Continue your current warfarin dose    Instructed patient to follow up no later than: 1 week  Orders given to  Homecare nurse/facility to recheck    Education provided: Yes: interactions with Warfarin/INR      Nurse Raj verbalizes understanding and agrees to warfarin dosing plan.    Instructed to call the Anticoagulation Clinic for any changes, questions or concerns. (#[phone])        OBJECTIVE:  INR   Date Value Ref Range Status   2020 2.6 (A) 0.90 - 1.10 Final             Anticoagulation Summary  As of 2020    INR goal:   2.0-3.0   TTR:   87.0 % (3.2 mo)   INR used for dosin.6 (2020)   Warfarin maintenance plan:   3.75 mg (2.5 mg x 1.5) every Mon, Wed, Fri; 2.5 mg (2.5 mg x 1) all other days   Full warfarin instructions:   3.75 mg every Mon, Wed, Fri; 2.5 mg all other days   Weekly warfarin total:   21.25 mg   No change documented:   Azul Whitaker RN   Plan last modified:   Azul Whitaker RN (10/23/2019)   Next INR check:   2020   Priority:   High   Target end date:   Indefinite    Indications    Permanent atrial fibrillation [I48.21]  Chronic atrial fibrillation [I48.20]  Long term (current) use of anticoagulants [Z79.01]             Anticoagulation Episode Summary     INR check location:       Preferred lab:   EXTERNAL LAB     Send INR reminders to:   NIKKI CHASE    Comments:   Home care       Anticoagulation Care Providers     Provider Role Specialty Phone number    Patti Suárez MD [facility] [phone]

## 2020-02-05 NOTE — TELEPHONE ENCOUNTER
Pt calling requesting diabetic testing supplies. She misplaced all of what she previously had. Prescriptions can be sent to pharmacy that is teed up. She would like this done ASA. Pt can be reached at [phone]. OK to leave a detailed message. Please advise. Thanks.     A/P:  SBO  NGT decompression  NPO, IV hydration  GI/DVT prophylaxis  Pain control  Incentive spirometry  Serial abd exams  F/U labs, radiologic studies  Monitor bowel function  Pt will be monitored for signs of evolution/resolution of pathology and surgical intervention as required and warranted  Pt aware of and agrees with all of the above ASSESSMENT:  84 Y old female with multiple medical problems, presented with diffuse upper abdominal pain colicky , intermittent  mild radiation to back. Pain is associated with nausea and vomiting x3, no fever Last BM yesterday, not sure if passing gas. No SOB, as H/o ch cough, ch sore throat was being followed up by ENT was suppose to get a biopsy 3/26. No diarrhea mild dysuria. H/O Ch pancytopenia. N o sick contacts h/O similar symptoms almost a year ago but SBO was not conformed then. Medicine was asked for consultation regarding comorbidities.     *Acute on Chronic Throat Pain- better  -baseline chronic pain,worse with NGT- now out  -symptom control with chloroseptic spray  -Outpatient ENT follow up with Dr Mariano Gamboa [phone]. Biopsy of nodules at base of tongue scheduled for 3/26. Family requesting ENT consult in house. Defer to primary. Did call and notify outpatient ENT of admission.    *Pancytopenia  - per chart review, extensive workup noted and no clear etiology for pancytopenia  - monitor counts, if stable will have pt f/u w her hematologist    *DM type II without complication  -A1C 6.2  -glucophage PTA    *SBO  -Risk factors: multiple abdominal surgeries  -CTAP: Small bowel obstruction with transition zone RLQ. Associated minimal mesenteric edema and right-sided ascites with the obstruction.  -improving, ngt out  - diet when ok from surgical standpoint 84 Y old female with SBO, with ch cough, pancytopenia, HCT 21 this morning, stable on repeat, no signs of acute bleeding, HD stable.    Regular diet  IVL  DVT /GI prophylaxis  Pulmonology following  Medicine following  Repeat hct  stable, was offered more monitoring and possible blood transfusion, Pt is refusing any transfusion.  D/W family , the want Pt to be discharged if she is tolerating diet. ASSESSMENT:  84 Y old female with multiple medical problems, presented with diffuse upper abdominal pain colicky , intermittent  mild radiation to back. Pain is associated with nausea and vomiting x3, no fever Last BM yesterday, not sure if passing gas. No SOB, as H/o ch cough, ch sore throat was being followed up by ENT was suppose to get a biopsy 3/26. No diarrhea mild dysuria. H/O Ch pancytopenia. N o sick contacts h/O similar symptoms almost a year ago but SBO was not conformed then. Medicine was asked for consultation regarding comorbidities.     *Acute on Chronic Throat Pain- resolved  -Outpatient ENT follow up with Dr Mariano Gamboa [phone]. Biopsy of nodules at base of tongue scheduled for 3/26. Outpt ENT f/u.    *Pancytopenia  - per chart review, extensive workup noted and no clear etiology for pancytopenia  - monitor counts, if stable will have pt f/u w her hematologist  - offered prbc, pt refused. has rpt cbc in 2-3 days outpt.     *DM type II without complication  -cw home meds    *SBO  -Risk factors: multiple abdominal surgeries  -CTAP: Small bowel obstruction with transition zone RLQ. Associated minimal mesenteric edema and right-sided ascites with the obstruction.  -improving, ngt out  - tolerated diet, +BMs    stable for discharge from medical point of view. PROBLEMS:    SBO  CHRONIC COUGH  SORE THROAT- ENT PATH  Chronic kidney disease  Anemia, unspecified type  GERD   High cholesterol  Diabetes  HTN    PLAN;    NPO  SURGERY FU  PULMONARY OPTIMIZED  AEROSOLS  DVT PROPHYLASIX PROBLEMS:    SBO  CHRONIC COUGH  SORE THROAT- ENT PATH  Chronic kidney disease  Anemia, unspecified type  GERD   High cholesterol  Diabetes  HTN    PLAN;    tolerating po  SURGERY FU  PULMONARY stable  AEROSOLS  DVT PROPHYLASIX

## 2020-02-06 RX ORDER — LANCETS
EACH MISCELLANEOUS
Qty: 100 EACH | Refills: 3 | Status: SHIPPED | OUTPATIENT
Start: 2020-02-06 | End: 2021-02-22

## 2020-02-06 RX ORDER — GLUCOSAMINE HCL/CHONDROITIN SU 500-400 MG
CAPSULE ORAL
Qty: 100 EACH | Refills: 3 | Status: SHIPPED | OUTPATIENT
Start: 2020-02-06 | End: 2023-05-12

## 2020-02-06 NOTE — TELEPHONE ENCOUNTER
Per chart this office has not ordered testing supplies previously. Per last office visit note, patient reports she is not checking her blood sugars at all.    Primary care provider, ok to order supplies? Please advise for directions how often patient should be testing.

## 2020-02-06 NOTE — TELEPHONE ENCOUNTER
Prescriptions for glucose meter and all supplies approved per [facility] Refill Protocol and sent to [facility]. BENITA Graham R.N.

## 2020-02-11 DIAGNOSIS — E11.42 DIABETIC POLYNEUROPATHY ASSOCIATED WITH TYPE 2 DIABETES MELLITUS (H): ICD-10-CM

## 2020-02-11 NOTE — TELEPHONE ENCOUNTER
"Requested Prescriptions   Pending Prescriptions Disp Refills     blood glucose (NO BRAND SPECIFIED) test strip  Last Written Prescription Date:  2/6/2020  Last Fill Quantity: 100,  # refills: 3   Last office visit: 1/31/2020 with prescribing provider:  Dr. Lauren   Future Office Visit:   Next 5 appointments (look out 90 days)    Feb 21, 2020  2:40 PM CST  SHORT with Patti Suárez MD  [facility] ([facility]) [address]  [facility] [id number] [phone]         100 strip 3     Sig: Use to test blood sugar 1 time daily. To accompany: Blood Glucose Monitor Brands: Contour Next.       Diabetic Supplies Protocol Passed - 2/11/2020  4:34 PM        Passed - Medication is active on med list        Passed - Patient is 18 years of age or older        Passed - Recent (6 mo) or future (30 days) visit within the authorizing provider's specialty     Patient had office visit in the last 6 months or has a visit in the next 30 days with authorizing provider.  See \"Patient Info\" tab in inbasket, or \"Choose Columns\" in Meds & Orders section of the refill encounter.          Prescription approved per [facility] Refill Protocol. Patient informed prescription was sent to the pharmacy.  "

## 2020-02-11 NOTE — TELEPHONE ENCOUNTER
Patient spent over 50 minutes on the telephone with [facility] and is now angry that she has not received her supplies.    Patient states that she found her old meter and does not need lancets but she does need the Contour Next Test Strips as soon as possible.  Please send to local [facility] is [place].

## 2020-02-12 ENCOUNTER — TELEPHONE (OUTPATIENT)
Dept: INTERNAL MEDICINE | Facility: CLINIC | Age: 80
End: 2020-02-12

## 2020-02-12 DIAGNOSIS — I48.20 CHRONIC ATRIAL FIBRILLATION (H): ICD-10-CM

## 2020-02-12 DIAGNOSIS — I48.21 PERMANENT ATRIAL FIBRILLATION (H): ICD-10-CM

## 2020-02-12 DIAGNOSIS — Z79.01 LONG TERM (CURRENT) USE OF ANTICOAGULANTS: ICD-10-CM

## 2020-02-12 LAB — INR PPP: 2.8 (ref 0.9–1.1)

## 2020-02-12 NOTE — TELEPHONE ENCOUNTER
"Requested Prescriptions   Pending Prescriptions Disp Refills     warfarin ANTICOAGULANT (COUMADIN) 2.5 MG tablet Last Written Prescription Date:  11/6/2019  Last Fill Quantity: 126 tablet,  # refills: 0   Last office visit: 1/31/2020 with prescribing provider:  1/31/2020  Future Office Visit:   Next 5 appointments (look out 90 days)    Feb 21, 2020  2:40 PM CST  SHORT with Patti Suárez MD  [facility] [address]  [facility] [address]  [phone]        126 tablet 0     Sig: Take 3.75mg(1 1/2 tablets) Mon/Wed/Fri and 2.5mg (1 tablet) the rest of the days of the week. Or as directed by the Anticoagulation Clinic       Vitamin K Antagonists Failed - 2/12/2020  3:09 PM        Failed - INR is within goal in the past 6 weeks     Confirm INR is within goal in the past 6 weeks.     Recent Labs   Lab Test 02/12/20   INR 2.8*                       Passed - Recent (12 mo) or future (30 days) visit within the authorizing provider's specialty     Patient has had an office visit with the authorizing provider or a provider within the authorizing providers department within the previous 12 mos or has a future within next 30 days. See \"Patient Info\" tab in inbasket, or \"Choose Columns\" in Meds & Orders section of the refill encounter.              Passed - Medication is active on med list        Passed - Patient is 18 years of age or older        Passed - Patient is not pregnant        Passed - No positive pregnancy on file in past 12 months        "

## 2020-02-12 NOTE — TELEPHONE ENCOUNTER
ANTICOAGULATION MANAGEMENT     Patient Name:  Savanna Rehman  Date:  2020    ASSESSMENT /SUBJECTIVE:      Today's INR result of 2.8 is therapeutic. Goal INR of 2.0-3.0      Warfarin dose taken: Warfarin taken as previously instructed    Diet: Change in alcohol intake may be affecting INR (pt had half of a vodka drink last night)    Medication changes/ interactions: No new medications/supplements affecting INR    Previous INR: Therapeutic     S/S of bleeding or thromboembolism: No    New injury or illness:  No    Upcoming surgery, procedure or cardioversion:  No    Additional findings: None    PLAN:    Spoke with Raj ( nurse) regarding INR result and instructed:     Warfarin Dosing Instructions: Continue your current warfarin dose of 3.75mg Mon/Wed/Fri; 2.5mg all other days    Instructed patient to follow up no later than: 1 week (w/ next homecare visit)  Orders given to  Homecare nurse/facility to recheck    Education provided: Yes: Effect of alcohol on INR      Raj verbalizes understanding and agrees to warfarin dosing plan.    Instructed to call the Anticoagulation Clinic for any changes, questions or concerns. (#[phone])        OBJECTIVE:  INR   Date Value Ref Range Status   2020 2.8 (A) 0.90 - 1.10 Final             Anticoagulation Summary  As of 2020    INR goal:   2.0-3.0   TTR:   87.9 % (3.4 mo)   INR used for dosin.8 (2020)   Warfarin maintenance plan:   3.75 mg (2.5 mg x 1.5) every Mon, Wed, Fri; 2.5 mg (2.5 mg x 1) all other days   Full warfarin instructions:   3.75 mg every Mon, Wed, Fri; 2.5 mg all other days   Weekly warfarin total:   21.25 mg   Plan last modified:   Azul Whitaker RN (10/23/2019)   Next INR check:   2020   Priority:   High   Target end date:   Indefinite    Indications    Permanent atrial fibrillation [I48.21]  Chronic atrial fibrillation [I48.20]  Long term (current) use of anticoagulants [Z79.01]             Anticoagulation Episode  Summary     INR check location:       Preferred lab:   EXTERNAL LAB    Send INR reminders to:   NIKKI CHASE    Comments:   Home care       Anticoagulation Care Providers     Provider Role Specialty Phone number    Patti Suárez MD [facility] Internal Medicine [phone]

## 2020-02-13 RX ORDER — WARFARIN SODIUM 2.5 MG/1
TABLET ORAL
Qty: 126 TABLET | Refills: 0 | Status: SHIPPED | OUTPATIENT
Start: 2020-02-13 | End: 2020-05-14

## 2020-02-13 NOTE — TELEPHONE ENCOUNTER
Warfarin dosing is managed by INR Clinic.  Prescription approved per [facility] Refill Protocol.  Bella Arechiga RN

## 2020-02-14 ENCOUNTER — OFFICE VISIT (OUTPATIENT)
Dept: SLEEP MEDICINE | Facility: CLINIC | Age: 80
End: 2020-02-14
Payer: COMMERCIAL

## 2020-02-14 VITALS
WEIGHT: 266 LBS | OXYGEN SATURATION: 95 % | SYSTOLIC BLOOD PRESSURE: 142 MMHG | HEART RATE: 69 BPM | DIASTOLIC BLOOD PRESSURE: 66 MMHG | BODY MASS INDEX: 40.45 KG/M2

## 2020-02-14 DIAGNOSIS — G47.33 OBSTRUCTIVE SLEEP APNEA (ADULT) (PEDIATRIC): Primary | ICD-10-CM

## 2020-02-14 DIAGNOSIS — G47.33 OSA (OBSTRUCTIVE SLEEP APNEA): Primary | ICD-10-CM

## 2020-02-14 PROCEDURE — 99214 OFFICE O/P EST MOD 30 MIN: CPT | Performed by: INTERNAL MEDICINE

## 2020-02-14 NOTE — PROGRESS NOTES
Patient picked up oximeter  and was instructed on use. They showed understanding by demonstrating it back.

## 2020-02-14 NOTE — PROGRESS NOTES
[facility]   Outpatient Sleep Medicine Consultation  February 14, 2020      Name: Savanna Rehman MRN# [id number]   Age: 79 year old YOB: 1940     Date of Consultation: February 14, 2020  Consultation is requested by: No referring provider defined for this encounter.  Primary care provider: Patti Suárez           Assessment and Plan:       Severe obstructive sleep apnea with substantial difficulties tolerating CPAP    Obesity hypoventilation with nocturnal hypoxemia    Hypertrophic cardiomyopathy and atrial fibrillation with history of congestive heart failure      Summary Recommendations:      Continue CPAP use at least for 3 hours nightly as you are doing before December     And oximetry will be obtained on CPAP and if you continue to have hypoxia we would recommend continuing oxygen using measures to reduce your intolerance to the noise in the room such as using background white noise  or earplugs.    Summary Counseling:  This patient was informed in regards to the nature of her congestive heart failure and vascular disease which puts her at some risk for increased hypoxic stress related to discontinuing oxygen and CPAP.  This patient presented to the emergency room and was evaluated for acute dyspnea in late January and my review of the x-ray suggest pulmonary congestion at the time.  She had awaken and had to sit on the edge of the bed over a prolonged period of time with panting and dyspnea.  We informed her that further hypoxic stress in the setting of her heart disease would be harmful and she seems willing at this point to consider reinitiating her CPAP therapy.  We will obtain overnight oximetry on CPAP to verify the continued need for oxygen and she will withhold returning it as she had planned before today's visit.    Check out [email or web address]           History of Present Illness:     Savanna Rehman is a 79 year old female  with history  of obstructive sleep apnea and nocturnal hypoxemia, atrial fibrillation, congestive heart failure, coronary artery disease, fibromyalgia, neuropathy who requests discontinuation of nocturnal oxygen and has not been using her CPAP over the past 6 weeks.  She would like to discontinue the oxygen because her cat does not like it and it is costing her too much money which is taxing on her home budget.  She also finds that the oxygen device is too noisy and keeps her awake at night.  She has not used a CPAP in part because she is not convinced of the seriousness of her sleep apnea or coexisting heart disease.    PREVIOUS SLEEP STUDIES: done in [place]   Date: 2011  AHI: 33.2/hr  Intervention: auto-CPAP 4-6 cmH2O  Lowest O2 saturation: 80     Date: prior to above study  AHI: 108/hr  Intervention: CPAP  Lowest O2 saturation: 69%     PS2018  Sleep latency 7.7 minutes without sleep aid.    REM not achieved.       Sleep efficiency 78.7 %. Total sleep time 148.5 minutes.  Sleep architecture:  Stage 1, 3.7 % (5%), stage 2, 35 % (45-55%), stage 3, 61.3 % (15-20%), stage REM, 0 % (20-25%).    AHI was 27.9/hour.   CSAI was 0/hour.   RDI 30.7/hour.    REM AHI N/A.    Supine AHI N/A.    Lowest O2 saturation: 78.6 %  She spent 28.8 minutes below 88% SpO2.   Periodic Limb Movement Index 36.4/hour               Medications:     Current Outpatient Medications   Medication Sig     ACE/ARB/ARNI NOT PRESCRIBED (INTENTIONAL) Please choose reason not prescribed, below     alcohol swab prep pads Use to swab area of injection/chandrakant as directed.     B Complex-C (SUPER B COMPLEX PO) Take 1 tablet by mouth At Bedtime     BETA BLOCKER NOT PRESCRIBED (INTENTIONAL) Beta Blocker not prescribed intentionally due to Hypotension (SBP < 90)     Biotin 5000 MCG TABS Take 5,000 mcg by mouth At Bedtime     cholecalciferol (VITAMIN D3) 5000 units (125 mcg) capsule Take 5,000 Units by mouth At Bedtime     order for DME Oxygen 2 Li/min  at night  bleed with CPAP     oxybutynin (DITROPAN) 5 MG tablet Take 5 mg by mouth At Bedtime Clarified as immediate release with [facility]./Patient      ranitidine (ZANTAC) 150 MG tablet Take 150 mg by mouth At Bedtime     thin (NO BRAND SPECIFIED) lancets Use with lanceting device to check glucose once a day. To accompany: Blood Glucose Monitor Brands: per insurance.     warfarin ANTICOAGULANT (COUMADIN) 2.5 MG tablet Take 3.75mg(1 1/2 tablets) Mon/Wed/Fri and 2.5mg (1 tablet) the rest of the days of the week. Or as directed by the Anticoagulation Clinic     blood glucose (NO BRAND SPECIFIED) test strip Use to test blood sugar 1 time daily. To accompany: Blood Glucose Monitor Brands: Contour Next.     blood glucose calibration (NO BRAND SPECIFIED) solution To accompany: Blood Glucose Monitor Brands: per insurance.     blood glucose monitoring (NO BRAND SPECIFIED) meter device kit Use to test blood sugar 1 times daily. Preferred blood glucose meter OR supplies to accompany: Blood Glucose Monitor Brands: per insurance.     glipiZIDE (GLUCOTROL XL) 2.5 MG 24 hr tablet Take 2.5 mg by mouth At Bedtime     No current facility-administered medications for this visit.      Facility-Administered Medications Ordered in Other Visits   Medication     nitroglycerin 100 MCG/ML injection        Allergies   Allergen Reactions     Augmented Betamethasone Diprop [Betamethasone] Other (See Comments)     Severe yeast infection     Petroleum Jelly [Petrolatum] Anaphylaxis     Rash and swelling     Shellfish-Derived Products Anaphylaxis     Tongue swelling     Aspirin Swelling     tiongue swelling     Bacitracin      Rash swelling     Coumadin [Warfarin] Swelling     Leg swelling     Darvon [Propoxyphene] Swelling     Throat closes     Dilaudid [Hydromorphone]      Levaquin [Levofloxacin] Swelling     Tongue swelling     Neosporin [Neomycin-Polymyx-Gramicid] Swelling     rash     Nitrofurantoin      SOB, GI upset,     Oxycodone      Severe  itching     Percodan [Oxycodone-Aspirin]      Severe itching     Tramadol      Vicodin [Hydrocodone-Acetaminophen]      Severe itching       Xarelto [Rivaroxaban]      Adhesive Tape Rash     Band aids      Codeine Rash            Past Medical History:     Does not need 02 supplement at night   Past Medical History:   Diagnosis Date     Anemia     Iron Deficiency anemia     Atrial fibrillation (H)      CAD (coronary artery disease)     non-obstructive     Chronic pain     neck, low back, legs     Congestive heart failure (H)      Degenerative disk disease      Fibromyalgia      Gastro-oesophageal reflux disease      Gout      Hiatal hernia      Mumps      Neuropathy      Palpitations      Pernicious anemia      Sleep apnea     uses CPAP.     Spider veins      Urinary incontinence      Vitamin D deficiency              Past Surgical History:    No h/o  upper airway surgery  Past Surgical History:   Procedure Laterality Date     APPENDECTOMY       CHOLECYSTECTOMY       COLONOSCOPY  3/15/2011     CORONARY ANGIOGRAPHY ADULT ORDER       HEART CATH LEFT HEART CATH  12/30/16    medication management     HYSTERECTOMY TOTAL ABDOMINAL       Knee replacement NOS Left      LAPAROSCOPIC NISSEN FUNDOPLICATION N/A 2/4/2015    Procedure: LAPAROSCOPIC NISSEN FUNDOPLICATION;  Surgeon: Armando Ansari MD;  Location: [facility] OR     TONSILLECTOMY       TRANSPOSITION ULNAR NERVE (ELBOW)                        Physical Examination:   BP (!) 142/66   Pulse 69   Wt 120.7 kg (266 lb)   LMP  (LMP Unknown)   SpO2 95%   BMI 40.45 kg/m             9/2018    Copy to: Patti Suárez  There is borderline concentric left ventricular hypertrophy.  The visual ejection fraction is estimated at 50-55%.  The left atrium is mild to moderately dilated.  The right atrium is mildly dilated.  There is mild (1+) aortic regurgitation.  No hemodynamically significant valvular aortic stenosis.  The ascending aorta is Mildly dilated.  Inferior vena  cava not well visualized for estimation of right atrial  pressure.  Technically difficult study limited views obtained due to body habitus    NATANAEL GIRALDO MD 2/14/2020     [facility]   Floor 1, [address]   [address] [place] [address]   Appointments: [phone]    [facility]  3rd Floor  [address], [place] [address]     Total time spent with patient: 25 min >50% counseling

## 2020-02-14 NOTE — NURSING NOTE
"Chief Complaint   Patient presents with     RECHECK     f/u use of oxygen cannot afford.  Use of cpap       Initial BP (!) 142/66   Pulse 69   Wt 120.7 kg (266 lb)   LMP  (LMP Unknown)   SpO2 95%   BMI 40.45 kg/m   Estimated body mass index is 40.45 kg/m  as calculated from the following:    Height as of 12/10/19: 1.727 m (5' 8\").    Weight as of this encounter: 120.7 kg (266 lb).    Medication Reconciliation: complete        DME: yes   "

## 2020-02-17 ENCOUNTER — DOCUMENTATION ONLY (OUTPATIENT)
Dept: SLEEP MEDICINE | Facility: CLINIC | Age: 80
End: 2020-02-17
Payer: COMMERCIAL

## 2020-02-17 ENCOUNTER — TELEPHONE (OUTPATIENT)
Dept: UROLOGY | Facility: CLINIC | Age: 80
End: 2020-02-17

## 2020-02-18 DIAGNOSIS — Z87.440 PERSONAL HISTORY OF URINARY TRACT INFECTION: Primary | ICD-10-CM

## 2020-02-18 RX ORDER — DIAZEPAM 5 MG
5 TABLET ORAL ONCE
Qty: 1 TABLET | Refills: 0 | Status: SHIPPED | OUTPATIENT
Start: 2020-02-18 | End: 2020-02-27

## 2020-02-19 ENCOUNTER — DOCUMENTATION ONLY (OUTPATIENT)
Dept: CARDIOLOGY | Facility: CLINIC | Age: 80
End: 2020-02-19

## 2020-02-19 NOTE — PROGRESS NOTES
Office note for July and Sept 2019 from [facility] sent to scan

## 2020-02-20 ENCOUNTER — TELEPHONE (OUTPATIENT)
Dept: INTERNAL MEDICINE | Facility: CLINIC | Age: 80
End: 2020-02-20

## 2020-02-20 DIAGNOSIS — I48.21 PERMANENT ATRIAL FIBRILLATION (H): ICD-10-CM

## 2020-02-20 DIAGNOSIS — Z79.01 LONG TERM (CURRENT) USE OF ANTICOAGULANTS: ICD-10-CM

## 2020-02-20 DIAGNOSIS — I48.20 CHRONIC ATRIAL FIBRILLATION (H): ICD-10-CM

## 2020-02-20 LAB — INR PPP: 3.4 (ref 0.9–1.1)

## 2020-02-20 NOTE — TELEPHONE ENCOUNTER
ANTICOAGULATION MANAGEMENT     Patient Name:  Savanna Rehman  Date:  2/20/2020    ASSESSMENT /SUBJECTIVE:      Today's INR result of 3.4 is supratherapeutic. Goal INR of 2.0-3.0      Warfarin dose taken: Warfarin taken as previously instructed    Diet: No new diet changes affecting INR    Medication changes/ interactions: No new medications/supplements affecting INR    Previous INR: Therapeutic     S/S of bleeding or thromboembolism: No    New injury or illness:  No    Upcoming surgery, procedure or cardioversion:  No    Additional findings: Patient's INR has been trending up for past few weeks      PLAN:    Spoke with Alvin home care nurse regarding INR result and instructed:     Warfarin Dosing Instructions: Change your warfarin dose to 3.75 mg Mon, 2.5 mg all other days    Instructed patient to follow up no later than: 1 week  Orders given to  Homecare nurse/facility to recheck    Education provided: Terri Anthony Nurse verbalizes understanding and agrees to warfarin dosing plan.    Instructed to call the Anticoagulation Clinic for any changes, questions or concerns. (#[phone])        OBJECTIVE:  INR   Date Value Ref Range Status   02/20/2020 3.4 (A) 0.90 - 1.10 Final             Anticoagulation Summary  As of 2/20/2020    INR goal:   2.0-3.0   TTR:   84.0 % (3.7 mo)   INR used for dosing:   3.4! (2/20/2020)   Warfarin maintenance plan:   3.75 mg (2.5 mg x 1.5) every Mon; 2.5 mg (2.5 mg x 1) all other days   Full warfarin instructions:   3.75 mg every Mon; 2.5 mg all other days   Weekly warfarin total:   18.75 mg   Plan last modified:   Azul Whitaker RN (2/20/2020)   Next INR check:   2/26/2020   Priority:   High   Target end date:   Indefinite    Indications    Permanent atrial fibrillation [I48.21]  Chronic atrial fibrillation [I48.20]  Long term (current) use of anticoagulants [Z79.01]             Anticoagulation Episode Summary     INR check location:       Preferred lab:   EXTERNAL LAB    Send  INR reminders to:   NIKKI CHASE    Comments:   Home care       Anticoagulation Care Providers     Provider Role Specialty Phone number    Patti Suárez MD [facility] Internal [facility] [phone]

## 2020-02-25 ENCOUNTER — TELEPHONE (OUTPATIENT)
Dept: UROLOGY | Facility: CLINIC | Age: 80
End: 2020-02-25

## 2020-02-25 NOTE — TELEPHONE ENCOUNTER
2/26/20  Attempted again to speak with Savanna re: her request to change her UA/UC arrangements.   Left message with the following:  - orders are in her chart for UA/UC and she can get this done at any [facility] lab, but should do it by 3/2 at the latest to have enough time to treat her, if needed, before her Cysto on 3/10.  Requested she call clinic back, if questions.  BENITA Mccarthy RN  ___________________________________    Called Savanna back to f/u on this request but she wasn't able to talk and requested we call her back tomorrow, 2/26.  Deisy

## 2020-02-25 NOTE — TELEPHONE ENCOUNTER
[facility] Call Center    Phone Message    May a detailed message be left on voicemail: yes     Reason for Call: Other: Savanna calling to speak with someone on Dr. Wilson's team regarding her upcoming appointment on Friday 2/28.  She states she has to pay $12 to get a ride to her appointment and is wondering what other options there are.  She states that she can drop off a sample on Monday 3/2 or Tuesday 3/3 but is unsure how much time in advance Dr. Wilson needs the results for her to be able to keep her upcoming Cysto appointment. Please call her back to advise     Action Taken: Message routed to:  Other: [facility]    Travel Screening: Not Applicable

## 2020-02-26 ENCOUNTER — TELEPHONE (OUTPATIENT)
Dept: INTERNAL MEDICINE | Facility: CLINIC | Age: 80
End: 2020-02-26

## 2020-02-26 DIAGNOSIS — Z79.01 LONG TERM (CURRENT) USE OF ANTICOAGULANTS: ICD-10-CM

## 2020-02-26 DIAGNOSIS — I48.20 CHRONIC ATRIAL FIBRILLATION (H): ICD-10-CM

## 2020-02-26 DIAGNOSIS — N39.0 URINARY TRACT INFECTION: ICD-10-CM

## 2020-02-26 DIAGNOSIS — Z87.440 HISTORY OF RECURRENT UTIS: ICD-10-CM

## 2020-02-26 DIAGNOSIS — I48.21 PERMANENT ATRIAL FIBRILLATION (H): ICD-10-CM

## 2020-02-26 DIAGNOSIS — Z01.812 PRE-PROCEDURE LAB EXAM: Primary | ICD-10-CM

## 2020-02-26 LAB — INR PPP: 3.3 (ref 0.9–1.1)

## 2020-02-26 NOTE — TELEPHONE ENCOUNTER
ANTICOAGULATION MANAGEMENT     Patient Name:  Savanna Rehman  Date:  2/26/2020    ASSESSMENT /SUBJECTIVE:      Today's INR result of 3.3 is supratherapeutic. Goal INR of 2.0-3.0      Warfarin dose taken: Warfarin taken as previously instructed    Diet: No new diet changes affecting INR - reports she hasn't been eating greens but plans on eating some peas tonight    Medication changes/ interactions: No new medications/supplements affecting INR    Previous INR: Supratherapeutic     S/S of bleeding or thromboembolism: No    New injury or illness:  No    Upcoming surgery, procedure or cardioversion:  No    Additional findings: none      PLAN:    Spoke with Raj home care nurse, regarding INR result and instructed:     Warfarin Dosing Instructions: Hold warfarin today only then continue your current warfarin dose of 3.75 mg every mon; 2.5 mg all other days    Instructed patient to follow up no later than: 1 week  Orders given to  Homecare nurse/facility to recheck    Education provided: Yes: significance of INR result      Raj verbalizes understanding and agrees to warfarin dosing plan.    Instructed to call the Anticoagulation Clinic for any changes, questions or concerns. (#[phone])        OBJECTIVE:  INR   Date Value Ref Range Status   02/26/2020 3.3 (A) 0.90 - 1.10 Final             Anticoagulation Summary  As of 2/26/2020    INR goal:   2.0-3.0   TTR:   79.6 % (3.9 mo)   INR used for dosing:   3.3! (2/26/2020)   Warfarin maintenance plan:   3.75 mg (2.5 mg x 1.5) every Mon; 2.5 mg (2.5 mg x 1) all other days   Full warfarin instructions:   2/26: Hold; Otherwise 3.75 mg every Mon; 2.5 mg all other days   Weekly warfarin total:   18.75 mg   Plan last modified:   Azul Whitaker, RN (2/20/2020)   Next INR check:   3/4/2020   Priority:   High   Target end date:   Indefinite    Indications    Permanent atrial fibrillation [I48.21]  Chronic atrial fibrillation [I48.20]  Long term (current) use of  anticoagulants [Z79.01]             Anticoagulation Episode Summary     INR check location:       Preferred lab:   EXTERNAL LAB    Send INR reminders to:   NIKKI CHASE    Comments:   Home care       Anticoagulation Care Providers     Provider Role Specialty Phone number    Patti Suárez MD [facility] Internal Medicine [phone]

## 2020-02-27 ENCOUNTER — OFFICE VISIT (OUTPATIENT)
Dept: CARDIOLOGY | Facility: CLINIC | Age: 80
End: 2020-02-27
Payer: COMMERCIAL

## 2020-02-27 ENCOUNTER — DOCUMENTATION ONLY (OUTPATIENT)
Dept: CARDIOLOGY | Facility: CLINIC | Age: 80
End: 2020-02-27

## 2020-02-27 VITALS
OXYGEN SATURATION: 93 % | HEART RATE: 73 BPM | DIASTOLIC BLOOD PRESSURE: 75 MMHG | BODY MASS INDEX: 40.92 KG/M2 | SYSTOLIC BLOOD PRESSURE: 127 MMHG | WEIGHT: 270 LBS | HEIGHT: 68 IN

## 2020-02-27 DIAGNOSIS — I48.21 PERMANENT ATRIAL FIBRILLATION (H): Primary | ICD-10-CM

## 2020-02-27 DIAGNOSIS — Z79.01 WARFARIN ANTICOAGULATION: ICD-10-CM

## 2020-02-27 DIAGNOSIS — Z91.199 POOR COMPLIANCE WITH CONTINUOUS POSITIVE AIRWAY PRESSURE TREATMENT: ICD-10-CM

## 2020-02-27 DIAGNOSIS — I25.10 MILD CORONARY ARTERY DISEASE: ICD-10-CM

## 2020-02-27 DIAGNOSIS — R60.0 BILATERAL LOWER EXTREMITY EDEMA: ICD-10-CM

## 2020-02-27 DIAGNOSIS — E66.01 MORBID OBESITY (H): ICD-10-CM

## 2020-02-27 DIAGNOSIS — G47.33 OBSTRUCTIVE SLEEP APNEA SYNDROME: ICD-10-CM

## 2020-02-27 PROBLEM — I48.91 ATRIAL FIBRILLATION, UNSPECIFIED TYPE (H): Status: RESOLVED | Noted: 2020-01-16 | Resolved: 2020-02-27

## 2020-02-27 PROBLEM — E11.42 DIABETIC POLYNEUROPATHY ASSOCIATED WITH TYPE 2 DIABETES MELLITUS (H): Status: RESOLVED | Noted: 2019-10-22 | Resolved: 2020-02-27

## 2020-02-27 PROCEDURE — 99214 OFFICE O/P EST MOD 30 MIN: CPT | Performed by: INTERNAL MEDICINE

## 2020-02-27 SDOH — HEALTH STABILITY: MENTAL HEALTH: HOW OFTEN DO YOU HAVE A DRINK CONTAINING ALCOHOL?: MONTHLY OR LESS

## 2020-02-27 ASSESSMENT — MIFFLIN-ST. JEOR: SCORE: 1758.21

## 2020-02-27 NOTE — LETTER
2/27/2020    Patti Suárez MD  [address] [facility] [address]    RE: Savanna Rehman       Dear Colleague,    I had the pleasure of seeing Savanna Sherie in the [facility].    Clinic visit note dictated. Dictation reference number - [id number]          REVIEW OF SYSTEMS:  A comprehensive 10-point review of systems was completed and the pertinent positives are documented in the history of present illness.    Skin:  Positive for     Eyes:  Positive for glasses  ENT:  Negative    Respiratory:    sleep apnea;CPAP  Cardiovascular:    edema  Gastroenterology: Positive for reflux  Genitourinary:  Positive for incontinence  Musculoskeletal:  Positive for back pain  Neurologic:  Positive for headaches;numbness or tingling of feet  Psychiatric:  Positive for depression  Heme/Lymph/Imm:  Positive for easy bruising  Endocrine:  Positive for diabetes    CURRENT MEDICATIONS:  Current Outpatient Medications   Medication Sig Dispense Refill     ACE/ARB/ARNI NOT PRESCRIBED (INTENTIONAL) Please choose reason not prescribed, below       alcohol swab prep pads Use to swab area of injection/chandrakant as directed. 100 each 3     BETA BLOCKER NOT PRESCRIBED (INTENTIONAL) Beta Blocker not prescribed intentionally due to Hypotension (SBP < 90)       Biotin 5000 MCG TABS Take 5,000 mcg by mouth At Bedtime       blood glucose (NO BRAND SPECIFIED) test strip Use to test blood sugar 1 time daily. To accompany: Blood Glucose Monitor Brands: Contour Next. 100 strip 1     blood glucose calibration (NO BRAND SPECIFIED) solution To accompany: Blood Glucose Monitor Brands: per insurance. 1 Bottle 3     blood glucose monitoring (NO BRAND SPECIFIED) meter device kit Use to test blood sugar 1 times daily. Preferred blood glucose meter OR supplies to accompany: Blood Glucose Monitor Brands: per insurance. 1 kit 0     cholecalciferol (VITAMIN D3) 5000 units (125 mcg) capsule Take 5,000 Units by mouth At  Bedtime       glipiZIDE (GLUCOTROL XL) 2.5 MG 24 hr tablet Take 2.5 mg by mouth At Bedtime       order for DME Oxygen 2 Li/min  at night bleed with CPAP       oxybutynin (DITROPAN) 5 MG tablet Take 5 mg by mouth At Bedtime Clarified as immediate release with [facility]./Patient        ranitidine (ZANTAC) 150 MG tablet Take 150 mg by mouth At Bedtime       thin (NO BRAND SPECIFIED) lancets Use with lanceting device to check glucose once a day. To accompany: Blood Glucose Monitor Brands: per insurance. 100 each 3     warfarin ANTICOAGULANT (COUMADIN) 2.5 MG tablet Take 3.75mg(1 1/2 tablets) Mon/Wed/Fri and 2.5mg (1 tablet) the rest of the days of the week. Or as directed by the Anticoagulation Clinic 126 tablet 0     B Complex-C (SUPER B COMPLEX PO) Take 1 tablet by mouth At Bedtime           ALLERGIES:  Allergies   Allergen Reactions     Augmented Betamethasone Diprop [Betamethasone] Other (See Comments)     Severe yeast infection     Petroleum Jelly [Petrolatum] Anaphylaxis     Rash and swelling     Shellfish-Derived Products Anaphylaxis     Tongue swelling     Aspirin Swelling     tiongue swelling     Bacitracin      Rash swelling     Coumadin [Warfarin] Swelling     Leg swelling     Darvon [Propoxyphene] Swelling     Throat closes     Dilaudid [Hydromorphone]      Levaquin [Levofloxacin] Swelling     Tongue swelling     Neosporin [Neomycin-Polymyx-Gramicid] Swelling     rash     Nitrofurantoin      SOB, GI upset,     Oxycodone      Severe itching     Percodan [Oxycodone-Aspirin]      Severe itching     Tramadol      Vicodin [Hydrocodone-Acetaminophen]      Severe itching       Xarelto [Rivaroxaban]      Adhesive Tape Rash     Band aids      Codeine Rash       PAST MEDICAL HISTORY:    Past Medical History:   Diagnosis Date     Anemia     Iron Deficiency anemia     Atrial fibrillation (H)      CAD (coronary artery disease)     non-obstructive     Chronic pain     neck, low back, legs     Congestive heart failure (H)       Degenerative disk disease      Fibromyalgia      Gastro-oesophageal reflux disease      Gout      Hiatal hernia      Mumps      Neuropathy      Palpitations      Pernicious anemia      Sleep apnea     uses CPAP.     Urinary incontinence      Vitamin D deficiency        PAST SURGICAL HISTORY:    Past Surgical History:   Procedure Laterality Date     APPENDECTOMY       CHOLECYSTECTOMY       COLONOSCOPY  3/15/2011     CORONARY ANGIOGRAPHY ADULT ORDER       HEART CATH LEFT HEART CATH  16    medication management     HYSTERECTOMY TOTAL ABDOMINAL       Knee replacement NOS Left      LAPAROSCOPIC NISSEN FUNDOPLICATION N/A 2015    Procedure: LAPAROSCOPIC NISSEN FUNDOPLICATION;  Surgeon: Armando Ansari MD;  Location: [facility] OR     TONSILLECTOMY       TRANSPOSITION ULNAR NERVE (ELBOW)         FAMILY HISTORY:    Family History   Problem Relation Age of Onset     Alzheimer Disease Mother      Lung Cancer Father      No Known Problems Brother      No Known Problems Brother      No Known Problems Brother      Unknown/Adopted No family hx of        SOCIAL HISTORY:    Social History     Socioeconomic History     Marital status:      Spouse name: None     Number of children: None     Years of education: None     Highest education level: None   Occupational History     None   Social Needs     Financial resource strain: None     Food insecurity:     Worry: None     Inability: None     Transportation needs:     Medical: None     Non-medical: None   Tobacco Use     Smoking status: Former Smoker     Packs/day: 0.50     Years: 50.00     Pack years: 25.00     Types: Cigarettes     Last attempt to quit: 2014     Years since quittin.4     Smokeless tobacco: Never Used   Substance and Sexual Activity     Alcohol use: Yes     Frequency: Monthly or less     Comment: socially     Drug use: Never     Sexual activity: Not Currently   Lifestyle     Physical activity:     Days per week: None     Minutes per session: None  "    Stress: None   Relationships     Social connections:     Talks on phone: None     Gets together: None     Attends Cheondoism service: None     Active member of club or organization: None     Attends meetings of clubs or organizations: None     Relationship status: None     Intimate partner violence:     Fear of current or ex partner: None     Emotionally abused: None     Physically abused: None     Forced sexual activity: None   Other Topics Concern     Parent/sibling w/ CABG, MI or angioplasty before 65F 55M? Not Asked   Social History Narrative     None       PHYSICAL EXAM:    Vitals: /75 (BP Location: Other (Comment), Patient Position: Sitting, Cuff Size: Adult Small)   Pulse 73   Ht 1.727 m (5' 8\")   Wt 122.5 kg (270 lb)   LMP  (LMP Unknown)   SpO2 93%   BMI 41.05 kg/m     Wt Readings from Last 5 Encounters:   02/27/20 122.5 kg (270 lb)   02/14/20 120.7 kg (266 lb)   01/31/20 120.2 kg (265 lb)   01/16/20 120.7 kg (266 lb)   12/10/19 122.5 kg (270 lb)           Encounter Diagnoses   Name Primary?     Permanent atrial fibrillation Yes     Warfarin anticoagulation      Morbid obesity (H)      Mild coronary artery disease      Bilateral lower extremity edema      Obstructive sleep apnea syndrome      Poor compliance with continuous positive airway pressure treatment        Orders Placed This Encounter   Procedures     Basic metabolic panel     Follow-Up with Cardiac Advanced Practice Provider     Echocardiogram Complete                   Thank you for allowing me to participate in the care of your patient.      Sincerely,     David Salgado MD     [facility]    cc:   No referring provider defined for this encounter.        "

## 2020-02-27 NOTE — PROGRESS NOTES
Message from scheduling:  Dr Salgado saw pt today. Dr Salgado put in for 9 month follow up. Savanna stated she told Dr Salgado she wants to come back in 6  months. She said Dr Salgado said that would be fine. Could you take change the orders to 6month which would be August. Thank you so  much! Padmaja     Moved OV, echo and bmp to August, 2020.

## 2020-02-27 NOTE — PROGRESS NOTES
Service Date: 02/27/2020      LOCATION:  [facility].      CARDIOLOGY MELANI:  BATSHEVA Bates, CNP.      PRIMARY CARE PROVIDER:  Dr. Patti Suárez.      REASON FOR VISIT:     1.  Scheduled followup of persistent atrial fibrillation, morbid obesity, question of heart failure.      HISTORY OF PRESENT ILLNESS:     Savanna is new to my practice.  I am inheriting her care from her former cardiologist, Dr. Dottie Geller.  Savanna lives on her own in assisted living, is 79 years old, morbidly obese with a BMI of 42 kg/m2, her daughter lives nearby and she enjoys brunch with her daughter every week, she ambulates with a walker.      Her medical history is significant for:   1.  Asymptomatic, persistent atrial fibrillation that is rate controlled without any medications.  She is on chronic warfarin anticoagulation.  No prior stroke.   2.  Type 2 diabetes mellitus.  On oral agents.  HbA1c 6.4%.   3.  Morbid obesity.  Body weight 123 kg or 270 pounds.  BMI of 42 kg/m2.   4.  Prior history of heart failure with preserved ejection fraction, but she has had no symptoms from this for years.  Her chronic dyspnea is more likely related to her body habitus and sedentary lifestyle.   5.  Bilateral chronic lower extremity edema.  Probably due to venous insufficiency and sedentary status.  The patient does not tolerate diuretic therapy due to urinary incontinence (wears adult diapers) and recurrent UTIs.   6.  Urinary incontinence and recurrent UTIs.   7.  Angiographically mild coronary artery disease on angiogram in 2016.   8.  Obstructive sleep apnea.  Intolerant of CPAP therapy.   9.  Chronic pain and fibromyalgia.      Savanna has been followed in Cardiology for the last few years.  Her atrial fibrillation is rate controlled, she is on warfarin anticoagulation without thromboembolic events, although the notes suggest she is hypertensive, her blood pressure has always been in the  normal range and was 127/75 mmHg today without any blood pressure-lowering medications.     She was in the emergency room recently after being woken up from sleep feeling short of breath.  I reviewed all her testing.  Troponin negative, NT-proBNP 975 (her baseline is much higher at 3397-0175), chest x-ray was unremarkable, ECG unchanged with chronic rate-controlled atrial fibrillation, BP on presentation was in the 140s/90s.  I suspect it was due to her untreated sleep apnea.  She herself believes that she was started on a suppressive antibiotic for her recurrent UTIs the day before and had an allergic reaction to it.  She does indeed have a history of angioedema.      DIAGNOSTIC DATA:   Labs -- Total cholesterol 170, HDL 33, , triglycerides 119, potassium 4.3, creatinine 0.8 with an estimated GFR of 72, hemoglobin 15.2, A1c 6.4, TSH 2.45.  Serial INRs are therapeutic.      Recent ECG shows persistent atrial fibrillation of 70-80 BPM.      Last echocardiogram in 2018 showed a normal LVEF of 50%-55%, mild concentric left ventricular hypertrophy, moderately dilated left atrium, normal right ventricular systolic function, trace mild mitral and tricuspid valve regurgitation, no significant aortic valve disease.      PHYSICAL EXAMINATION:    She is morbidly obese, heart sounds are irregularly irregular, soft systolic murmur (question aortic valve sclerosis), no carotid bruit.  Lungs are clear.  Bilateral lower extremity edema that is largely nonpitting.  No skin changes suggestive of chronic venous stasis.  Abdomen large pannus.  Detailed exam not possible.  Mentally alert and oriented.      ASSESSMENT AND PLAN:     The patient's cardiovascular issues are stable.  Atrial fibrillation is self-rate controlled, appropriately warfarin anticoagulated, she is normotensive, renal function normal.  Although she carries a diagnosis of chronic diastolic heart failure, I suspect her lower extremity edema is more venous  insufficiency and sedentary lifestyle rather than heart failure related.  In any case, she herself endorses that she is not a good candidate for diuretic therapy because of her recurrent UTIs and incontinence.  We talked again about the importance of treating sleep apnea, but she is simply intolerant of the CPAP.      1.  I would like her to follow up with her established cardiology MELANI, Rosanna Gramajo, in 6-9 months with a repeat echocardiogram.   2.  I do not need to see her on a regular basis unless there is an acute change in her cardiac symptoms.      It was my pleasure to visit with this nice lady.      cc:   Patti Suárez MD    [facility]    [address], [address]    [address] MERE WEEMS MD             D: 2020   T: 2020   MT: CHIKI      Name:     KRIS SANDERSON   MRN:      [id number]        Account:      [id number]   :      1940           Service Date: 2020      Document: [id number]

## 2020-02-27 NOTE — PROGRESS NOTES
Clinic visit note dictated. Dictation reference number - [id number]          REVIEW OF SYSTEMS:  A comprehensive 10-point review of systems was completed and the pertinent positives are documented in the history of present illness.    Skin:  Positive for     Eyes:  Positive for glasses  ENT:  Negative    Respiratory:    sleep apnea;CPAP  Cardiovascular:    edema  Gastroenterology: Positive for reflux  Genitourinary:  Positive for incontinence  Musculoskeletal:  Positive for back pain  Neurologic:  Positive for headaches;numbness or tingling of feet  Psychiatric:  Positive for depression  Heme/Lymph/Imm:  Positive for easy bruising  Endocrine:  Positive for diabetes    CURRENT MEDICATIONS:  Current Outpatient Medications   Medication Sig Dispense Refill     ACE/ARB/ARNI NOT PRESCRIBED (INTENTIONAL) Please choose reason not prescribed, below       alcohol swab prep pads Use to swab area of injection/chandrakant as directed. 100 each 3     BETA BLOCKER NOT PRESCRIBED (INTENTIONAL) Beta Blocker not prescribed intentionally due to Hypotension (SBP < 90)       Biotin 5000 MCG TABS Take 5,000 mcg by mouth At Bedtime       blood glucose (NO BRAND SPECIFIED) test strip Use to test blood sugar 1 time daily. To accompany: Blood Glucose Monitor Brands: Contour Next. 100 strip 1     blood glucose calibration (NO BRAND SPECIFIED) solution To accompany: Blood Glucose Monitor Brands: per insurance. 1 Bottle 3     blood glucose monitoring (NO BRAND SPECIFIED) meter device kit Use to test blood sugar 1 times daily. Preferred blood glucose meter OR supplies to accompany: Blood Glucose Monitor Brands: per insurance. 1 kit 0     cholecalciferol (VITAMIN D3) 5000 units (125 mcg) capsule Take 5,000 Units by mouth At Bedtime       glipiZIDE (GLUCOTROL XL) 2.5 MG 24 hr tablet Take 2.5 mg by mouth At Bedtime       order for DME Oxygen 2 Li/min  at night bleed with CPAP       oxybutynin (DITROPAN) 5 MG tablet Take 5 mg by mouth At Bedtime Clarified as  immediate release with [facility]./Patient        ranitidine (ZANTAC) 150 MG tablet Take 150 mg by mouth At Bedtime       thin (NO BRAND SPECIFIED) lancets Use with lanceting device to check glucose once a day. To accompany: Blood Glucose Monitor Brands: per insurance. 100 each 3     warfarin ANTICOAGULANT (COUMADIN) 2.5 MG tablet Take 3.75mg(1 1/2 tablets) Mon/Wed/Fri and 2.5mg (1 tablet) the rest of the days of the week. Or as directed by the Anticoagulation Clinic 126 tablet 0     B Complex-C (SUPER B COMPLEX PO) Take 1 tablet by mouth At Bedtime           ALLERGIES:  Allergies   Allergen Reactions     Augmented Betamethasone Diprop [Betamethasone] Other (See Comments)     Severe yeast infection     Petroleum Jelly [Petrolatum] Anaphylaxis     Rash and swelling     Shellfish-Derived Products Anaphylaxis     Tongue swelling     Aspirin Swelling     tiongue swelling     Bacitracin      Rash swelling     Coumadin [Warfarin] Swelling     Leg swelling     Darvon [Propoxyphene] Swelling     Throat closes     Dilaudid [Hydromorphone]      Levaquin [Levofloxacin] Swelling     Tongue swelling     Neosporin [Neomycin-Polymyx-Gramicid] Swelling     rash     Nitrofurantoin      SOB, GI upset,     Oxycodone      Severe itching     Percodan [Oxycodone-Aspirin]      Severe itching     Tramadol      Vicodin [Hydrocodone-Acetaminophen]      Severe itching       Xarelto [Rivaroxaban]      Adhesive Tape Rash     Band aids      Codeine Rash       PAST MEDICAL HISTORY:    Past Medical History:   Diagnosis Date     Anemia     Iron Deficiency anemia     Atrial fibrillation (H)      CAD (coronary artery disease)     non-obstructive     Chronic pain     neck, low back, legs     Congestive heart failure (H)      Degenerative disk disease      Fibromyalgia      Gastro-oesophageal reflux disease      Gout      Hiatal hernia      Mumps      Neuropathy      Palpitations      Pernicious anemia      Sleep apnea     uses CPAP.     Urinary  incontinence      Vitamin D deficiency        PAST SURGICAL HISTORY:    Past Surgical History:   Procedure Laterality Date     APPENDECTOMY       CHOLECYSTECTOMY       COLONOSCOPY  3/15/2011     CORONARY ANGIOGRAPHY ADULT ORDER       HEART CATH LEFT HEART CATH  16    medication management     HYSTERECTOMY TOTAL ABDOMINAL       Knee replacement NOS Left      LAPAROSCOPIC NISSEN FUNDOPLICATION N/A 2015    Procedure: LAPAROSCOPIC NISSEN FUNDOPLICATION;  Surgeon: Armando Ansari MD;  Location: [facility] OR     TONSILLECTOMY       TRANSPOSITION ULNAR NERVE (ELBOW)         FAMILY HISTORY:    Family History   Problem Relation Age of Onset     Alzheimer Disease Mother      Lung Cancer Father      No Known Problems Brother      No Known Problems Brother      No Known Problems Brother      Unknown/Adopted No family hx of        SOCIAL HISTORY:    Social History     Socioeconomic History     Marital status:      Spouse name: None     Number of children: None     Years of education: None     Highest education level: None   Occupational History     None   Social Needs     Financial resource strain: None     Food insecurity:     Worry: None     Inability: None     Transportation needs:     Medical: None     Non-medical: None   Tobacco Use     Smoking status: Former Smoker     Packs/day: 0.50     Years: 50.00     Pack years: 25.00     Types: Cigarettes     Last attempt to quit: 2014     Years since quittin.4     Smokeless tobacco: Never Used   Substance and Sexual Activity     Alcohol use: Yes     Frequency: Monthly or less     Comment: socially     Drug use: Never     Sexual activity: Not Currently   Lifestyle     Physical activity:     Days per week: None     Minutes per session: None     Stress: None   Relationships     Social connections:     Talks on phone: None     Gets together: None     Attends Mosque service: None     Active member of club or organization: None     Attends meetings of clubs or  "organizations: None     Relationship status: None     Intimate partner violence:     Fear of current or ex partner: None     Emotionally abused: None     Physically abused: None     Forced sexual activity: None   Other Topics Concern     Parent/sibling w/ CABG, MI or angioplasty before 65F 55M? Not Asked   Social History Narrative     None       PHYSICAL EXAM:    Vitals: /75 (BP Location: Other (Comment), Patient Position: Sitting, Cuff Size: Adult Small)   Pulse 73   Ht 1.727 m (5' 8\")   Wt 122.5 kg (270 lb)   LMP  (LMP Unknown)   SpO2 93%   BMI 41.05 kg/m    Wt Readings from Last 5 Encounters:   02/27/20 122.5 kg (270 lb)   02/14/20 120.7 kg (266 lb)   01/31/20 120.2 kg (265 lb)   01/16/20 120.7 kg (266 lb)   12/10/19 122.5 kg (270 lb)           Encounter Diagnoses   Name Primary?     Permanent atrial fibrillation Yes     Warfarin anticoagulation      Morbid obesity (H)      Mild coronary artery disease      Bilateral lower extremity edema      Obstructive sleep apnea syndrome      Poor compliance with continuous positive airway pressure treatment        Orders Placed This Encounter   Procedures     Basic metabolic panel     Follow-Up with Cardiac Advanced Practice Provider     Echocardiogram Complete                 "

## 2020-02-27 NOTE — LETTER
2/27/2020      Patti Suárez MD  [address] [facility] [address]      RE: Savanna Rehman       Dear Colleague,    I had the pleasure of seeing Savanna Rehman in the [facility].    Service Date: 02/27/2020      LOCATION:  [facility].      CARDIOLOGY MELANI:  BATSHEVA Bates, CNP.      PRIMARY CARE PROVIDER:  Dr. Patti Suárez.      REASON FOR VISIT:     1.  Scheduled followup of persistent atrial fibrillation, morbid obesity, question of heart failure.      HISTORY OF PRESENT ILLNESS:     Savanna is new to my practice.  I am inheriting her care from her former cardiologist, Dr. Dottie Geller.  Savanna lives on her own in assisted living, is 79 years old, morbidly obese with a BMI of 42 kg/m2, her daughter lives nearby and she enjoys brunch with her daughter every week, she ambulates with a walker.      Her medical history is significant for:   1.  Asymptomatic, persistent atrial fibrillation that is rate controlled without any medications.  She is on chronic warfarin anticoagulation.  No prior stroke.   2.  Type 2 diabetes mellitus.  On oral agents.  HbA1c 6.4%.   3.  Morbid obesity.  Body weight 123 kg or 270 pounds.  BMI of 42 kg/m2.   4.  Prior history of heart failure with preserved ejection fraction, but she has had no symptoms from this for years.  Her chronic dyspnea is more likely related to her body habitus and sedentary lifestyle.   5.  Bilateral chronic lower extremity edema.  Probably due to venous insufficiency and sedentary status.  The patient does not tolerate diuretic therapy due to urinary incontinence (wears adult diapers) and recurrent UTIs.   6.  Urinary incontinence and recurrent UTIs.   7.  Angiographically mild coronary artery disease on angiogram in 2016.   8.  Obstructive sleep apnea.  Intolerant of CPAP therapy.   9.  Chronic pain and fibromyalgia.      Savanna has been followed in  Cardiology for the last few years.  Her atrial fibrillation is rate controlled, she is on warfarin anticoagulation without thromboembolic events, although the notes suggest she is hypertensive, her blood pressure has always been in the normal range and was 127/75 mmHg today without any blood pressure-lowering medications.     She was in the emergency room recently after being woken up from sleep feeling short of breath.  I reviewed all her testing.  Troponin negative, NT-proBNP 975 (her baseline is much higher at 1321-6388), chest x-ray was unremarkable, ECG unchanged with chronic rate-controlled atrial fibrillation, BP on presentation was in the 140s/90s.  I suspect it was due to her untreated sleep apnea.  She herself believes that she was started on a suppressive antibiotic for her recurrent UTIs the day before and had an allergic reaction to it.  She does indeed have a history of angioedema.      DIAGNOSTIC DATA:   Labs -- Total cholesterol 170, HDL 33, , triglycerides 119, potassium 4.3, creatinine 0.8 with an estimated GFR of 72, hemoglobin 15.2, A1c 6.4, TSH 2.45.  Serial INRs are therapeutic.      Recent ECG shows persistent atrial fibrillation of 70-80 BPM.      Last echocardiogram in 2018 showed a normal LVEF of 50%-55%, mild concentric left ventricular hypertrophy, moderately dilated left atrium, normal right ventricular systolic function, trace mild mitral and tricuspid valve regurgitation, no significant aortic valve disease.      PHYSICAL EXAMINATION:    She is morbidly obese, heart sounds are irregularly irregular, soft systolic murmur (question aortic valve sclerosis), no carotid bruit.  Lungs are clear.  Bilateral lower extremity edema that is largely nonpitting.  No skin changes suggestive of chronic venous stasis.  Abdomen large pannus.  Detailed exam not possible.  Mentally alert and oriented.      ASSESSMENT AND PLAN:     The patient's cardiovascular issues are stable.  Atrial  fibrillation is self-rate controlled, appropriately warfarin anticoagulated, she is normotensive, renal function normal.  Although she carries a diagnosis of chronic diastolic heart failure, I suspect her lower extremity edema is more venous insufficiency and sedentary lifestyle rather than heart failure related.  In any case, she herself endorses that she is not a good candidate for diuretic therapy because of her recurrent UTIs and incontinence.  We talked again about the importance of treating sleep apnea, but she is simply intolerant of the CPAP.      1.  I would like her to follow up with her established cardiology MELANI, Rosanna Gramajo, in 6-9 months with a repeat echocardiogram.   2.  I do not need to see her on a regular basis unless there is an acute change in her cardiac symptoms.      It was my pleasure to visit with this nice lady.          cc:   Patti Suárez MD    [facility]    [address], [address]    [place]  [address]         [facility] MERE WEEMS MD             D: 2020   T: 2020   MT: CHIKI      Name:     KRIS SANDERSON   MRN:      [id number]        Account:      [id number]   :      1940           Service Date: 2020      Document: [id number]           Outpatient Encounter Medications as of 2020   Medication Sig Dispense Refill     ACE/ARB/ARNI NOT PRESCRIBED (INTENTIONAL) Please choose reason not prescribed, below       alcohol swab prep pads Use to swab area of injection/chandrakant as directed. 100 each 3     BETA BLOCKER NOT PRESCRIBED (INTENTIONAL) Beta Blocker not prescribed intentionally due to Hypotension (SBP < 90)       Biotin 5000 MCG TABS Take 5,000 mcg by mouth At Bedtime       blood glucose (NO BRAND SPECIFIED) test strip Use to test blood sugar 1 time daily. To accompany: Blood Glucose Monitor Brands: Contour Next. 100 strip 1     blood glucose calibration (NO BRAND SPECIFIED) solution To accompany: Blood Glucose Monitor Brands:  per insurance. 1 Bottle 3     blood glucose monitoring (NO BRAND SPECIFIED) meter device kit Use to test blood sugar 1 times daily. Preferred blood glucose meter OR supplies to accompany: Blood Glucose Monitor Brands: per insurance. 1 kit 0     cholecalciferol (VITAMIN D3) 5000 units (125 mcg) capsule Take 5,000 Units by mouth At Bedtime       glipiZIDE (GLUCOTROL XL) 2.5 MG 24 hr tablet Take 2.5 mg by mouth At Bedtime       order for DME Oxygen 2 Li/min  at night bleed with CPAP       oxybutynin (DITROPAN) 5 MG tablet Take 5 mg by mouth At Bedtime Clarified as immediate release with [facility]./Patient        ranitidine (ZANTAC) 150 MG tablet Take 150 mg by mouth At Bedtime       thin (NO BRAND SPECIFIED) lancets Use with lanceting device to check glucose once a day. To accompany: Blood Glucose Monitor Brands: per insurance. 100 each 3     warfarin ANTICOAGULANT (COUMADIN) 2.5 MG tablet Take 3.75mg(1 1/2 tablets) Mon/Wed/Fri and 2.5mg (1 tablet) the rest of the days of the week. Or as directed by the Anticoagulation Clinic 126 tablet 0     B Complex-C (SUPER B COMPLEX PO) Take 1 tablet by mouth At Bedtime       [DISCONTINUED] diazepam 5 MG PO tablet Take 1 tablet (5 mg) by mouth once for 1 dose Bring  To office to take once  at office before procedure. You must have a  to and from appointment 1 tablet 0     Facility-Administered Encounter Medications as of 2/27/2020   Medication Dose Route Frequency Provider Last Rate Last Dose     nitroglycerin 100 MCG/ML injection                Again, thank you for allowing me to participate in the care of your patient.      Sincerely,    David Salgado MD     [facility]

## 2020-02-27 NOTE — PATIENT INSTRUCTIONS
1.  No changes to medications today.    2.  Follow-up with cardiology MELANI, Rosanna Gramajo, in 9 months with a heart ultrasound.    If you have any questions or concerns, please contact my nurses at [phone].

## 2020-03-03 DIAGNOSIS — N32.81 URGENCY-FREQUENCY SYNDROME: Primary | ICD-10-CM

## 2020-03-03 NOTE — TELEPHONE ENCOUNTER
"Patient is calling for refill of oxybutynin. She says she takes 5mg twice daily.           Requested Prescriptions   Pending Prescriptions Disp Refills     oxybutynin (DITROPAN) 5 MG tablet  Last Written Prescription Date:  historical  Last Fill Quantity: 0,  # refills: 0   Last office visit: 1/31/2020 with prescribing provider:  Leonidas   Future Office Visit:           Sig: Take 1 tablet (5 mg) by mouth At Bedtime Clarified as immediate release with [facility]./Patient       Muscarinic Antagonists (Urinary Incontinence Agents) Passed - 3/3/2020 11:25 AM        Passed - Recent (12 mo) or future (30 days) visit within the authorizing provider's specialty     Patient has had an office visit with the authorizing provider or a provider within the authorizing providers department within the previous 12 mos or has a future within next 30 days. See \"Patient Info\" tab in inbasket, or \"Choose Columns\" in Meds & Orders section of the refill encounter.              Passed - Medication is Oxybutynin and patient is 5 years of age or older        Passed - Patient does not have a diagnosis of glaucoma on the problem list     If glaucoma diagnosis is new, refer refill to physician.          Passed - Medication is active on med list        Passed - Patient is 18 years of age or older            "

## 2020-03-04 ENCOUNTER — TELEPHONE (OUTPATIENT)
Dept: INTERNAL MEDICINE | Facility: CLINIC | Age: 80
End: 2020-03-04

## 2020-03-04 ENCOUNTER — NURSE TRIAGE (OUTPATIENT)
Dept: NURSING | Facility: CLINIC | Age: 80
End: 2020-03-04

## 2020-03-04 DIAGNOSIS — E11.42 DIABETIC POLYNEUROPATHY ASSOCIATED WITH TYPE 2 DIABETES MELLITUS (H): ICD-10-CM

## 2020-03-04 DIAGNOSIS — Z87.440 HISTORY OF RECURRENT UTIS: ICD-10-CM

## 2020-03-04 DIAGNOSIS — E56.9 VITAMIN DEFICIENCY: ICD-10-CM

## 2020-03-04 DIAGNOSIS — I48.20 CHRONIC ATRIAL FIBRILLATION (H): ICD-10-CM

## 2020-03-04 DIAGNOSIS — E78.5 HYPERLIPIDEMIA LDL GOAL <100: ICD-10-CM

## 2020-03-04 DIAGNOSIS — N39.0 URINARY TRACT INFECTION: ICD-10-CM

## 2020-03-04 DIAGNOSIS — Z79.01 LONG TERM (CURRENT) USE OF ANTICOAGULANTS: ICD-10-CM

## 2020-03-04 DIAGNOSIS — Z01.812 PRE-PROCEDURE LAB EXAM: ICD-10-CM

## 2020-03-04 DIAGNOSIS — E55.9 VITAMIN D DEFICIENCY: ICD-10-CM

## 2020-03-04 LAB
ALBUMIN SERPL-MCNC: 3.6 G/DL (ref 3.4–5)
ALBUMIN UR-MCNC: NEGATIVE MG/DL
ALP SERPL-CCNC: 83 U/L (ref 40–150)
ALT SERPL W P-5'-P-CCNC: 31 U/L (ref 0–50)
ANION GAP SERPL CALCULATED.3IONS-SCNC: 3 MMOL/L (ref 3–14)
APPEARANCE UR: ABNORMAL
AST SERPL W P-5'-P-CCNC: 26 U/L (ref 0–45)
BILIRUB SERPL-MCNC: 0.8 MG/DL (ref 0.2–1.3)
BILIRUB UR QL STRIP: NEGATIVE
BUN SERPL-MCNC: 14 MG/DL (ref 7–30)
CALCIUM SERPL-MCNC: 9.4 MG/DL (ref 8.5–10.1)
CHLORIDE SERPL-SCNC: 104 MMOL/L (ref 94–109)
CHOLEST SERPL-MCNC: 169 MG/DL
CO2 SERPL-SCNC: 30 MMOL/L (ref 20–32)
COLOR UR AUTO: YELLOW
CREAT SERPL-MCNC: 0.83 MG/DL (ref 0.52–1.04)
GFR SERPL CREATININE-BSD FRML MDRD: 68 ML/MIN/{1.73_M2}
GLUCOSE SERPL-MCNC: 148 MG/DL (ref 70–99)
GLUCOSE UR STRIP-MCNC: NEGATIVE MG/DL
HBA1C MFR BLD: 6.6 % (ref 0–5.6)
HDLC SERPL-MCNC: 37 MG/DL
HGB UR QL STRIP: ABNORMAL
INR PPP: 2 (ref 0.9–1.1)
KETONES UR STRIP-MCNC: NEGATIVE MG/DL
LDLC SERPL CALC-MCNC: 111 MG/DL
LEUKOCYTE ESTERASE UR QL STRIP: ABNORMAL
NITRATE UR QL: POSITIVE
NONHDLC SERPL-MCNC: 132 MG/DL
PH UR STRIP: 6 PH (ref 5–7)
POTASSIUM SERPL-SCNC: 4 MMOL/L (ref 3.4–5.3)
PROT SERPL-MCNC: 8.5 G/DL (ref 6.8–8.8)
SODIUM SERPL-SCNC: 137 MMOL/L (ref 133–144)
SOURCE: ABNORMAL
SP GR UR STRIP: 1.02 (ref 1–1.03)
TRIGL SERPL-MCNC: 103 MG/DL
UROBILINOGEN UR STRIP-ACNC: 1 EU/DL (ref 0.2–1)

## 2020-03-04 PROCEDURE — 84207 ASSAY OF VITAMIN B-6: CPT | Mod: 90 | Performed by: INTERNAL MEDICINE

## 2020-03-04 PROCEDURE — 82306 VITAMIN D 25 HYDROXY: CPT | Performed by: INTERNAL MEDICINE

## 2020-03-04 PROCEDURE — 36415 COLL VENOUS BLD VENIPUNCTURE: CPT | Performed by: INTERNAL MEDICINE

## 2020-03-04 PROCEDURE — 87086 URINE CULTURE/COLONY COUNT: CPT | Performed by: UROLOGY

## 2020-03-04 PROCEDURE — 99000 SPECIMEN HANDLING OFFICE-LAB: CPT | Performed by: INTERNAL MEDICINE

## 2020-03-04 PROCEDURE — 83036 HEMOGLOBIN GLYCOSYLATED A1C: CPT | Performed by: INTERNAL MEDICINE

## 2020-03-04 PROCEDURE — 80053 COMPREHEN METABOLIC PANEL: CPT | Performed by: INTERNAL MEDICINE

## 2020-03-04 PROCEDURE — 80061 LIPID PANEL: CPT | Performed by: INTERNAL MEDICINE

## 2020-03-04 PROCEDURE — 81003 URINALYSIS AUTO W/O SCOPE: CPT | Performed by: UROLOGY

## 2020-03-04 NOTE — TELEPHONE ENCOUNTER
ANTICOAGULATION MANAGEMENT     Patient Name:  Savanna Rehman  Date:  3/4/2020    ASSESSMENT /SUBJECTIVE:      Today's INR result of 2.0 is therapeutic. Goal INR of 2.0-3.0      Warfarin dose taken: Warfarin taken as previously instructed    Diet: No new diet changes affecting INR    Medication changes/ interactions: No new medications/supplements affecting INR    Previous INR: Supratherapeutic     S/S of bleeding or thromboembolism: No    New injury or illness:  No    Upcoming surgery, procedure or cardioversion:  No    Additional findings: None      PLAN:    Spoke with jesus alberto Greco care nurse regarding INR result and instructed:     Warfarin Dosing Instructions: Continue your current warfarin dose    Instructed patient to follow up no later than: 2 weeks  Orders given to  Homecare nurse/facility to recheck    Education provided: [facility] verbalizes understanding and agrees to warfarin dosing plan.    Instructed to call the Anticoagulation Clinic for any changes, questions or concerns. (#[phone])        OBJECTIVE:  INR   Date Value Ref Range Status   2020 2.0 (A) 0.90 - 1.10 Final             Anticoagulation Summary  As of 3/4/2020    INR goal:   2.0-3.0   TTR:   79.5 % (4.1 mo)   INR used for dosin.0 (3/4/2020)   Warfarin maintenance plan:   3.75 mg (2.5 mg x 1.5) every Mon; 2.5 mg (2.5 mg x 1) all other days   Full warfarin instructions:   3.75 mg every Mon; 2.5 mg all other days   Weekly warfarin total:   18.75 mg   Plan last modified:   Azul Whitaker RN (2020)   Next INR check:   3/11/2020   Priority:   High   Target end date:   Indefinite    Indications    Permanent atrial fibrillation (Resolved) [I48.21]  Chronic atrial fibrillation [I48.20]  Long term (current) use of anticoagulants [Z79.01]             Anticoagulation Episode Summary     INR check location:       Preferred lab:   EXTERNAL LAB    Send INR reminders to:   NIKKI CHASE    Comments:   Home  care       Anticoagulation Care Providers     Provider Role Specialty Phone number    Patti Suárez MD Responsible Internal Medicine [phone]

## 2020-03-05 ENCOUNTER — TELEPHONE (OUTPATIENT)
Dept: UROLOGY | Facility: CLINIC | Age: 80
End: 2020-03-05

## 2020-03-05 LAB
BACTERIA SPEC CULT: NORMAL
DEPRECATED CALCIDIOL+CALCIFEROL SERPL-MC: 36 UG/L (ref 20–75)
SPECIMEN SOURCE: NORMAL

## 2020-03-05 RX ORDER — OXYBUTYNIN CHLORIDE 5 MG/1
5 TABLET ORAL 2 TIMES DAILY
Qty: 90 TABLET | Refills: 0 | Status: SHIPPED | OUTPATIENT
Start: 2020-03-05 | End: 2020-05-14

## 2020-03-05 NOTE — TELEPHONE ENCOUNTER
Savanna calls to ask for her A1C result. FNA informed her that it has not been read yet by MD, but result is at 6.6.Previous A1c in Nov 2019 at 6.4. Also asks for her urinalysis result and culture result. Advised to call back tomorrow when clinic is open. She verbalized understanding.    Natacha Echevarria RN/[facility] Nurse Advisor    Reason for Disposition    Health Information question, no triage required and triager able to answer question    Protocols used: INFORMATION ONLY CALL-A-AH

## 2020-03-05 NOTE — TELEPHONE ENCOUNTER
Routing refill request to provider for review/approval because:  Medication is reported/historical    Please associate a diagnosis. Sig updated to reflect patient's request below.

## 2020-03-05 NOTE — TELEPHONE ENCOUNTER
Called Savanna with the results and direction from Dr. Wilson as below. She denies UTI symptoms and will be here for there cysto appt on 3/10.  BENITA Mccarthy RN  __________________________________      Notes recorded by Deisy Wilson MD on 3/5/2020 at 5:00 PM CST  Please let patient know that the urine culture did not show infection but rather mixed thao    If she is having UTI symptoms recommend catheterized urine  ___________________________________    [facility] Call [facility]    Phone Message    May a detailed message be left on voicemail: yes     Reason for Call: Other: Patient called said she need to get her urine results from yesterday, please call to inform the patient of results.     Action Taken: Other: [facility] Urology    Travel Screening: Not Applicable

## 2020-03-06 LAB — VIT B6 SERPL-MCNC: 16.6 NMOL/L (ref 20–125)

## 2020-03-07 ENCOUNTER — NURSE TRIAGE (OUTPATIENT)
Dept: NURSING | Facility: CLINIC | Age: 80
End: 2020-03-07

## 2020-03-07 NOTE — TELEPHONE ENCOUNTER
Pt requesting lab results from 3/4/20.  Writer gave patient the information below from the letter sent to her home on 3/6/20:    These are the recent blood test results.  The A1c is in the same range as before.  Vitamin D level is little better than before and you should continue with the supplements.  Vitamin B6 is still on the low side.  You will benefit from vitamin B complex 1 tablet daily.  The rest of the results are in acceptable range.    She verbalized understanding and had no further questions.     Griselda Millre RN/MIKE    Reason for Disposition    Caller requesting lab results    Additional Information    Negative: Lab calling with strep throat test results and triager can call in prescription    Negative: Lab calling with urinalysis test results and triager can call in prescription    Negative: Medication questions    Negative: ED call to PCP    Negative: Physician call to PCP    Negative: Call about patient who is currently hospitalized    Negative: Lab or radiology calling with CRITICAL test results    Negative: [1] Prescription not at pharmacy AND [2] was prescribed today by PCP    Negative: [1] Follow-up call from patient regarding patient's clinical status AND [2] information urgent    Negative: [1] Caller requests to speak ONLY to PCP AND [2] URGENT question    Negative: [1] Caller requests to speak to PCP now AND [2] won't tell us reason for call  (Exception: if 10 pm to 6 am, caller must first discuss reason for the call)    Negative: Notification of hospital admission    Negative: Notification of death    Protocols used: PCP CALL - NO TRIAGE[facility]

## 2020-03-10 ENCOUNTER — OFFICE VISIT (OUTPATIENT)
Dept: UROLOGY | Facility: CLINIC | Age: 80
End: 2020-03-10
Payer: COMMERCIAL

## 2020-03-10 VITALS
WEIGHT: 266 LBS | HEART RATE: 78 BPM | SYSTOLIC BLOOD PRESSURE: 138 MMHG | BODY MASS INDEX: 40.32 KG/M2 | HEIGHT: 68 IN | DIASTOLIC BLOOD PRESSURE: 78 MMHG

## 2020-03-10 DIAGNOSIS — I48.20 CHRONIC ATRIAL FIBRILLATION (H): ICD-10-CM

## 2020-03-10 DIAGNOSIS — G47.33 OSA (OBSTRUCTIVE SLEEP APNEA): ICD-10-CM

## 2020-03-10 DIAGNOSIS — N39.0 RECURRENT UTI: Primary | ICD-10-CM

## 2020-03-10 DIAGNOSIS — E66.01 MORBID OBESITY (H): ICD-10-CM

## 2020-03-10 PROCEDURE — 99213 OFFICE O/P EST LOW 20 MIN: CPT | Performed by: UROLOGY

## 2020-03-10 RX ORDER — CEFAZOLIN SODIUM 1 G/50ML
1 INJECTION, SOLUTION INTRAVENOUS SEE ADMIN INSTRUCTIONS
Status: CANCELLED | OUTPATIENT
Start: 2020-03-10

## 2020-03-10 RX ORDER — CEFAZOLIN SODIUM IN 0.9 % NACL 3 G/100 ML
3 INTRAVENOUS SOLUTION, PIGGYBACK (ML) INTRAVENOUS
Status: CANCELLED | OUTPATIENT
Start: 2020-03-10

## 2020-03-10 ASSESSMENT — PAIN SCALES - GENERAL: PAINLEVEL: NO PAIN (0)

## 2020-03-10 ASSESSMENT — MIFFLIN-ST. JEOR: SCORE: 1740.07

## 2020-03-10 NOTE — PATIENT INSTRUCTIONS
We will work on getting you scheduled    You will likely have to have a visit with the anesthesiologists prior to your surgery    If you do not hear anything by next week please contact my office  [phone] for surgery scheduling  [phone] Adelaida HORTON Care Coordinator    It was a pleasure meeting with you today.  Thank you for allowing me and my team the privilege of caring for you today.  YOU are the reason we are here, and I truly hope we provided you with the excellent service you deserve.  Please let us know if there is anything else we can do for you so that we can be sure you are leaving completely satisfied with your care experience.

## 2020-03-10 NOTE — LETTER
"3/10/2020       RE: Savanna Rehman  [address]  [address]  [facility] [address]     Dear Colleague,    Thank you for referring your patient, Savanna Rehman, to the [facility] [place] [facility]. Please see a copy of my visit note below.    March 10, 2020    Return visit    Patient returns today for follow up.  She states that she knows that she needs to have a cystoscopy but simply has too much anxiety to have it here in the office and requests for it to be done under sedation.   She denies any changes in her health since last visit.    /78   Pulse 78   Ht 1.727 m (5' 8\")   Wt 120.7 kg (266 lb)   LMP  (LMP Unknown)   BMI 40.45 kg/m    She is comfortable, in no distress, non-labored breathing.      A/P: 79 year old obese F with DM, afib on coumadin, CRISTIANA not on CPAP    Discussed need for the cystoscopy given the Omar so will schedule for cystoscopy with possible biopsy in the OR in the near future.  Would like her to see PAC prior.    15 minutes were spent with the patient today, > 50% in counseling and coordination of care    Deisy Wilson MD MPH   of Urology    CC  Patient Care Team:  Patti Suárez MD as PCP - General (Internal Medicine)      "

## 2020-03-10 NOTE — NURSING NOTE
Chief Complaint   Patient presents with     urgency/frequency, incontinence, hx uti     Dawna Mckeon, EMT

## 2020-03-10 NOTE — PROGRESS NOTES
"March 10, 2020    Return visit    Patient returns today for follow up.  She states that she knows that she needs to have a cystoscopy but simply has too much anxiety to have it here in the office and requests for it to be done under sedation.   She denies any changes in her health since last visit.    /78   Pulse 78   Ht 1.727 m (5' 8\")   Wt 120.7 kg (266 lb)   LMP  (LMP Unknown)   BMI 40.45 kg/m    She is comfortable, in no distress, non-labored breathing.      A/P: 79 year old obese F with DM, afib on coumadin, CRISTIANA not on CPAP    Discussed need for the cystoscopy given the Omar so will schedule for cystoscopy with possible biopsy in the OR in the near future.  Would like her to see PAC prior.    15 minutes were spent with the patient today, > 50% in counseling and coordination of care    Deisy Wilson MD MPH   of Urology    CC  Patient Care Team:  Patti Suárez MD as PCP - General (Internal Medicine)  Patti Suárez MD as Assigned PCP  SELF, REFERRED              "

## 2020-03-12 ENCOUNTER — TELEPHONE (OUTPATIENT)
Dept: SLEEP MEDICINE | Facility: CLINIC | Age: 80
End: 2020-03-12

## 2020-03-12 NOTE — TELEPHONE ENCOUNTER
Spoke with Savanna and she has just had bridge work done in her mouth, and her assisted living building has been quarantined so she will be calling at another time to reschedule the oximeter pickup to use with pap settings and no oxygen.

## 2020-03-13 ENCOUNTER — TELEPHONE (OUTPATIENT)
Dept: INTERNAL MEDICINE | Facility: CLINIC | Age: 80
End: 2020-03-13

## 2020-03-13 ENCOUNTER — TELEPHONE (OUTPATIENT)
Dept: UROLOGY | Facility: CLINIC | Age: 80
End: 2020-03-13

## 2020-03-13 PROBLEM — N39.0 RECURRENT UTI: Status: ACTIVE | Noted: 2019-08-13

## 2020-03-13 NOTE — TELEPHONE ENCOUNTER
"Patient called to report she is having a cystoscopy on 4/27/20 and is concerned because her intermediate is on \"lockdown\" and wants to know if she should be holding her warfarin for 7 days as was instructed prior.  Patient was advised to follow instruction from urology, , prior to procedure.  Patient reports she will do this.    Patient has a lot of anxiety about upcoming procedure and just wants to make sure that holding this long is safe.  Patient educated on s/s to monitor for and the common hold practices for this procedure.  Writer let patient know that [facility] can reach out to urology to see if 5 days would be okay versus 7 days, patient reports \"please don't do that, I will do what they said of 7 days, I just wanted to hear from someone else that it's okay.\"    Patient will call back with any other questions/concerns.    Iris Kemp RN  Anticoagulation Clinic    "

## 2020-03-13 NOTE — TELEPHONE ENCOUNTER
Patient is scheduled for surgery with Dr. Wilson      Spoke or left message with: Savanna    Date of Surgery: 4/13/2020 (on waitlist) - if that does not happen scheduled on 4/27/2020    Location: [facility] OR    Informed patient they will need an adult  yes    Pre-op with surgeon (if applicable): n/a    H&P: Scheduled with PAC on 3/31/2020 @ 11:45a    Additional imaging/appointments: Nurse visit scheduled on 3/31/2020 @ 1:15p    Surgery packet: Mailed on 3/13/2020     Additional comments: n/a

## 2020-03-16 NOTE — TELEPHONE ENCOUNTER
FUTURE VISIT INFORMATION      SURGERY INFORMATION:    Date: 20    Location: [facility]    Surgeon:  Deisy Wilson MD    Anesthesia Type:  Choice    Procedure: CYSTOSCOPY, WITH POSSIBLE BLADDER BIOPSY     RECORDS REQUESTED FROM:       Primary Care Provider: Patti Suárez MD- [place]    Most recent EKG+ Tracin20    Most recent ECHO: 20    Most recent Coronary Angiogram: 16    Most recent PFT's: 17    Most recent Sleep Study:  18

## 2020-03-18 ENCOUNTER — TELEPHONE (OUTPATIENT)
Dept: INTERNAL MEDICINE | Facility: CLINIC | Age: 80
End: 2020-03-18

## 2020-03-18 DIAGNOSIS — I48.20 CHRONIC ATRIAL FIBRILLATION (H): ICD-10-CM

## 2020-03-18 DIAGNOSIS — Z79.01 LONG TERM (CURRENT) USE OF ANTICOAGULANTS: ICD-10-CM

## 2020-03-18 LAB — INR PPP: 2.5 (ref 0.9–1.1)

## 2020-03-18 NOTE — TELEPHONE ENCOUNTER
ANTICOAGULATION MANAGEMENT     Patient Name:  Savanna Rehman  Date:  3/18/2020    ASSESSMENT /SUBJECTIVE:      Today's INR result of 2.5 is therapeutic. Goal INR of 2.0-3.0      Warfarin dose taken: Warfarin taken as previously instructed    Diet: No new diet changes affecting INR    Medication changes/ interactions: No new medications/supplements affecting INR    Previous INR: Therapeutic     S/S of bleeding or thromboembolism: No    New injury or illness:  No    Upcoming surgery, procedure or cardioversion:  Yes; cystoscopy with possible bladder biopsy on     Additional findings: none      PLAN:    Spoke with Raj home care nurse, regarding INR result and instructed:     Warfarin Dosing Instructions: Continue your current warfarin dose of 3.75 mg every Mon; 2.5 all other days    Instructed patient to follow up no later than: 2 weeks  Orders given to  Homecare nurse/facility to recheck    Education provided: Yes: significance of INR result      Raj verbalizes understanding and agrees to warfarin dosing plan.    Instructed to call the Anticoagulation Clinic for any changes, questions or concerns. (#[phone])        OBJECTIVE:  INR   Date Value Ref Range Status   2020 2.5 (A) 0.90 - 1.10 Final             Anticoagulation Summary  As of 3/18/2020    INR goal:   2.0-3.0   TTR:   81.6 % (4.6 mo)   INR used for dosin.5 (3/18/2020)   Warfarin maintenance plan:   3.75 mg (2.5 mg x 1.5) every Mon; 2.5 mg (2.5 mg x 1) all other days   Full warfarin instructions:   3.75 mg every Mon; 2.5 mg all other days   Weekly warfarin total:   18.75 mg   Plan last modified:   Azul Whitaker RN (2020)   Next INR check:   2020   Priority:   High   Target end date:   Indefinite    Indications    Permanent atrial fibrillation (Resolved) [I48.21]  Chronic atrial fibrillation [I48.20]  Long term (current) use of anticoagulants [Z79.01]             Anticoagulation Episode Summary     INR check location:        Preferred lab:   EXTERNAL LAB    Send INR reminders to:   NIKKI CHASE    Comments:   Home care       Anticoagulation Care Providers     Provider Role Specialty Phone number    Patti Suárez MD [facility] Internal Medicine [phone]

## 2020-03-19 NOTE — TELEPHONE ENCOUNTER
Patient called and stated she would like to not be on the waitlist on 4/13/2020. She would prefer to have her surgery with Dr. Wilson on 4/27/2020. She also wanted to reschedule her PAC and nurse visit from 3/31/2020. Appointments rescheduled to 4/14/2020

## 2020-03-20 NOTE — TELEPHONE ENCOUNTER
FUTURE VISIT INFORMATION        SURGERY INFORMATION:    Date: 20    Location: [facility]    Surgeon:  Deisy Wilson MD    Anesthesia Type:  Choice    Procedure: CYSTOSCOPY, WITH POSSIBLE BLADDER BIOPSY     Consult : 3/10/2020     RECORDS REQUESTED FROM:         Primary Care Provider: Patti Suárez MD- [facility]     Most recent EKG+ Tracin20     Most recent ECHO: 20     Most recent Coronary Angiogram: 16     Most recent PFT's: 17     Most recent Sleep Study:  18

## 2020-03-24 DIAGNOSIS — N39.0 RECURRENT UTI: Primary | ICD-10-CM

## 2020-03-24 NOTE — NURSING NOTE
Patient calling to say she has a uti and wants to have her urine checked for urinary tract infection. She refuses to come for a catheterized specimen. Her symptoms are bladder pain when she touches her bladder and she vomits. Denies fever, chills, frequency, urgency, pain with urination or any other symptoms of urinary tract infection. Patient will try to get to lab today before the governor shuts down [place].   Adelaida Liu RN   Care Coordinator Urology

## 2020-03-24 NOTE — TELEPHONE ENCOUNTER
Called and spoke with patient about needing to postpone surgery with Dr. Wilson on 4/27/2020 due to COVID-19. Patient aware surgery scheduler will reach out when able to reschedule.

## 2020-03-26 ENCOUNTER — TELEPHONE (OUTPATIENT)
Dept: SURGERY | Facility: CLINIC | Age: 80
End: 2020-03-26

## 2020-03-26 ENCOUNTER — TELEPHONE (OUTPATIENT)
Dept: UROLOGY | Facility: CLINIC | Age: 80
End: 2020-03-26

## 2020-03-26 DIAGNOSIS — N34.2 INFECTIVE URETHRITIS: Primary | ICD-10-CM

## 2020-03-26 DIAGNOSIS — N39.0 RECURRENT UTI: ICD-10-CM

## 2020-03-26 LAB
ALBUMIN UR-MCNC: NEGATIVE MG/DL
APPEARANCE UR: ABNORMAL
BACTERIA #/AREA URNS HPF: ABNORMAL /HPF
BILIRUB UR QL STRIP: NEGATIVE
CAOX CRY #/AREA URNS HPF: ABNORMAL /HPF
COLOR UR AUTO: YELLOW
GLUCOSE UR STRIP-MCNC: NEGATIVE MG/DL
HGB UR QL STRIP: ABNORMAL
KETONES UR STRIP-MCNC: NEGATIVE MG/DL
LEUKOCYTE ESTERASE UR QL STRIP: ABNORMAL
NITRATE UR QL: POSITIVE
NON-SQ EPI CELLS #/AREA URNS LPF: ABNORMAL /LPF
PH UR STRIP: 5.5 PH (ref 5–7)
RBC #/AREA URNS AUTO: ABNORMAL /HPF
SOURCE: ABNORMAL
SP GR UR STRIP: 1.02 (ref 1–1.03)
UROBILINOGEN UR STRIP-ACNC: 0.2 EU/DL (ref 0.2–1)
WBC #/AREA URNS AUTO: ABNORMAL /HPF

## 2020-03-26 PROCEDURE — 87186 SC STD MICRODIL/AGAR DIL: CPT | Performed by: UROLOGY

## 2020-03-26 PROCEDURE — 81001 URINALYSIS AUTO W/SCOPE: CPT | Performed by: UROLOGY

## 2020-03-26 PROCEDURE — 87088 URINE BACTERIA CULTURE: CPT | Performed by: UROLOGY

## 2020-03-26 PROCEDURE — 87086 URINE CULTURE/COLONY COUNT: CPT | Performed by: UROLOGY

## 2020-03-26 RX ORDER — CEPHALEXIN 500 MG/1
500 CAPSULE ORAL 2 TIMES DAILY
Qty: 14 CAPSULE | Refills: 0 | Status: SHIPPED | OUTPATIENT
Start: 2020-03-26 | End: 2020-03-27

## 2020-03-26 NOTE — TELEPHONE ENCOUNTER
[facility] Call Center    Phone Message    May a detailed message be left on voicemail: yes     Reason for Call: Other: pt calling because she would like a call back from a nurse to speak about next steps. Please call the pt back to discuss.      Action Taken: Message routed to:  [facility] urology    Travel Screening: Not Applicable

## 2020-03-26 NOTE — TELEPHONE ENCOUNTER
[facility] Call Center    Phone Message    May a detailed message be left on voicemail: yes     Reason for Call: Other: Pt is also wanting to know if she will be getting an antibiotic for her UTI. Please advise.      Action Taken: Message routed to:  [facility] Uro    Travel Screening: Not Applicable

## 2020-03-27 DIAGNOSIS — N34.2 INFECTIVE URETHRITIS: ICD-10-CM

## 2020-03-27 DIAGNOSIS — N39.0 URINARY TRACT INFECTION: Primary | ICD-10-CM

## 2020-03-27 RX ORDER — CEPHALEXIN 500 MG/1
500 CAPSULE ORAL 2 TIMES DAILY
Qty: 10 CAPSULE | Refills: 0 | Status: SHIPPED | OUTPATIENT
Start: 2020-03-27 | End: 2020-07-10

## 2020-03-27 NOTE — TELEPHONE ENCOUNTER
79 year old with recent dysuria, frequency consistent symptoms with prior UTIs, no fevers, chills. Left UA today which was nitrite positive (a few squamous cells) and calls today hoping for antibiotic order.  - Past cultures with E Coli sensitive to first gen cephalosporin. Patient is also concerned she has a drug allergy not listed. She has taken Keflex before so Keflex is ordered empirically, culture pending.    Kasia Jasso MD  Urology Resident

## 2020-03-27 NOTE — TELEPHONE ENCOUNTER
Called and spoke with Savanna. She requests the Rx for cephalexin be sent to her pharmacy but she will hold off on picking it up. Today is Friday and she's concerned about getting through the weekend without starting something. However, if we get UC results in before the weekend, will adjust antibiotic accordingly.BENITA Mccarthy RN      Per Dr. Wilson:  If she can wait to see if we have any preliminary results I would prefer this.  She only has allergies to macrobid and levaquin per my review.  She has had some resistant bugs and would prefer to wait if she is able.  IF she is so miserable and unable to wait until we have more results I would recommend cephalexin 500mg twice a day for 5 days (but would really prefer to wait, at least to see if we have a preliminary result this afternoon)   Thanks       U/A and micro in but cult still pending. Question forwarded to provider via In Basket.  Deisy

## 2020-03-30 ENCOUNTER — TELEPHONE (OUTPATIENT)
Dept: INTERNAL MEDICINE | Facility: CLINIC | Age: 80
End: 2020-03-30

## 2020-03-30 DIAGNOSIS — Z79.01 LONG TERM (CURRENT) USE OF ANTICOAGULANTS: ICD-10-CM

## 2020-03-30 DIAGNOSIS — I48.20 CHRONIC ATRIAL FIBRILLATION (H): ICD-10-CM

## 2020-03-30 LAB — INR PPP: 2.1 (ref 0.9–1.1)

## 2020-03-30 NOTE — TELEPHONE ENCOUNTER
ANTICOAGULATION MANAGEMENT     Patient Name:  Savanna Rehman  Date:  3/30/2020    ASSESSMENT /SUBJECTIVE:      Today's INR result of 2.1 is therapeutic. Goal INR of 2.0-3.0      Warfarin dose taken: Warfarin taken as previously instructed    Diet: No new diet changes affecting INR    Medication changes/ interactions: Potential interaction between cephalexin and warfarin which may affect subsequent INRs-started cephalexin 3/28 x5 days    Previous INR: Therapeutic     S/S of bleeding or thromboembolism: No    New injury or illness:  Yes: dx'd w/ UTI 3/27    Upcoming surgery, procedure or cardioversion:  No    Additional findings: None    PLAN:    Spoke with Fannie ( RN) regarding INR result and instructed:     Warfarin Dosing Instructions: Continue your current warfarin dose of 3.75mg Mon; 2.5mg all other days    Instructed patient to follow up no later than: 20  Orders given to  Homecare nurse/facility to recheck    Education provided: Yes: Potential interaction between cephalexin can cause increased risk of bleeding. Please advise pt to report and unusual bleeding or bruising immediately or seek emergency medical attention if severe bleeding.      Fannie verbalizes understanding and agrees to warfarin dosing plan.    Instructed to call the Anticoagulation Clinic for any changes, questions or concerns. (#[phone])        OBJECTIVE:  INR   Date Value Ref Range Status   2020 2.1 (A) 0.90 - 1.10 Final             Anticoagulation Summary  As of 3/30/2020    INR goal:   2.0-3.0   TTR:   83.0 % (5 mo)   INR used for dosin.1 (3/30/2020)   Warfarin maintenance plan:   3.75 mg (2.5 mg x 1.5) every Mon; 2.5 mg (2.5 mg x 1) all other days   Full warfarin instructions:   3.75 mg every Mon; 2.5 mg all other days   Weekly warfarin total:   18.75 mg   Plan last modified:   Azul Whitaker RN (2020)   Next INR check:   2020   Priority:   High   Target end date:   Indefinite    Indications     Permanent atrial fibrillation (Resolved) [I48.21]  Chronic atrial fibrillation [I48.20]  Long term (current) use of anticoagulants [Z79.01]             Anticoagulation Episode Summary     INR check location:       Preferred lab:   EXTERNAL LAB    Send INR reminders to:   NIKKI CHASE    Comments:   Home care       Anticoagulation Care Providers     Provider Role Specialty Phone number    Patti Suárez MD [facility] Internal Medicine [phone]

## 2020-03-31 ENCOUNTER — PRE VISIT (OUTPATIENT)
Dept: SURGERY | Facility: CLINIC | Age: 80
End: 2020-03-31

## 2020-03-31 LAB
BACTERIA SPEC CULT: ABNORMAL
SPECIMEN SOURCE: ABNORMAL

## 2020-04-01 ENCOUNTER — TELEPHONE (OUTPATIENT)
Dept: INTERNAL MEDICINE | Facility: CLINIC | Age: 80
End: 2020-04-01

## 2020-04-01 DIAGNOSIS — Z53.9 DIAGNOSIS NOT YET DEFINED: Primary | ICD-10-CM

## 2020-04-01 PROCEDURE — G0179 MD RECERTIFICATION HHA PT: HCPCS | Performed by: INTERNAL MEDICINE

## 2020-04-02 ENCOUNTER — TELEPHONE (OUTPATIENT)
Dept: INTERNAL MEDICINE | Facility: CLINIC | Age: 80
End: 2020-04-02

## 2020-04-02 DIAGNOSIS — Z79.01 LONG TERM (CURRENT) USE OF ANTICOAGULANTS: ICD-10-CM

## 2020-04-02 DIAGNOSIS — I48.20 CHRONIC ATRIAL FIBRILLATION (H): ICD-10-CM

## 2020-04-02 LAB — INR PPP: 2.1 (ref 0.9–1.1)

## 2020-04-02 NOTE — TELEPHONE ENCOUNTER
ANTICOAGULATION MANAGEMENT     Patient Name:  Savanna Rehman  Date:  2020    ASSESSMENT /SUBJECTIVE:      Today's INR result of 2.1 is therapeutic. Goal INR of 2.0-3.0      Warfarin dose taken: Warfarin taken as previously instructed    Diet: No new diet changes affecting INR    Medication changes/ interactions: No new medications/supplements affecting INR    Previous INR: Therapeutic     S/S of bleeding or thromboembolism: No    New injury or illness:  No    Upcoming surgery, procedure or cardioversion:  Yes cystoscopy on 20, patient advised by urology to hold five days prior.        Additional findings: Finished antibiotic yesterday 20       PLAN:    Spoke with jennifer home care nurse,  regarding INR result and instructed:     Warfarin Dosing Instructions: Continue your current warfarin dose 3.75 mg on  and 2.5 mg all other days    Instructed patient to follow up no later than: 2 weeks  Orders given to  Homecare nurse/facility to recheck    Education provided: Terri Corrales verbalizes understanding and agrees to warfarin dosing plan.    Instructed to call the Anticoagulation Clinic for any changes, questions or concerns. (#[phone])        OBJECTIVE:  INR   Date Value Ref Range Status   2020 2.1 (A) 0.90 - 1.10 Final             Anticoagulation Summary  As of 2020    INR goal:   2.0-3.0   TTR:   83.4 % (5.1 mo)   INR used for dosin.1 (2020)   Warfarin maintenance plan:   3.75 mg (2.5 mg x 1.5) every Mon; 2.5 mg (2.5 mg x 1) all other days   Full warfarin instructions:   3.75 mg every Mon; 2.5 mg all other days   Weekly warfarin total:   18.75 mg   Plan last modified:   Azul Whitaker, RN (2020)   Next INR check:      Priority:   High   Target end date:   Indefinite    Indications    Permanent atrial fibrillation (Resolved) [I48.21]  Chronic atrial fibrillation [I48.20]  Long term (current) use of anticoagulants [Z79.01]             Anticoagulation Episode  Summary     INR check location:       Preferred lab:   EXTERNAL LAB    Send INR reminders to:   NIKKI CHASE    Comments:   Home care       Anticoagulation Care Providers     Provider Role Specialty Phone number    Patti Suárez MD [facility] Internal Medicine [phone]

## 2020-04-10 ENCOUNTER — NURSE TRIAGE (OUTPATIENT)
Dept: UROLOGY | Facility: CLINIC | Age: 80
End: 2020-04-10

## 2020-04-10 DIAGNOSIS — N39.0 URINARY TRACT INFECTION: Primary | ICD-10-CM

## 2020-04-11 ENCOUNTER — TELEPHONE (OUTPATIENT)
Dept: SURGERY | Facility: CLINIC | Age: 80
End: 2020-04-11

## 2020-04-11 ENCOUNTER — HOSPITAL ENCOUNTER (OUTPATIENT)
Dept: LAB | Facility: CLINIC | Age: 80
Discharge: HOME | End: 2020-04-11
Attending: UROLOGY | Admitting: UROLOGY
Payer: COMMERCIAL

## 2020-04-11 DIAGNOSIS — N39.0 URINARY TRACT INFECTION: ICD-10-CM

## 2020-04-11 PROCEDURE — 87186 SC STD MICRODIL/AGAR DIL: CPT | Performed by: UROLOGY

## 2020-04-11 PROCEDURE — 81001 URINALYSIS AUTO W/SCOPE: CPT | Performed by: UROLOGY

## 2020-04-11 PROCEDURE — 87088 URINE BACTERIA CULTURE: CPT | Performed by: UROLOGY

## 2020-04-11 PROCEDURE — 87086 URINE CULTURE/COLONY COUNT: CPT | Performed by: UROLOGY

## 2020-04-11 NOTE — TELEPHONE ENCOUNTER
Telephone Encounter    Date: 2:54 PM; 4/11/2020  Patient Name: Savanna Rehman  MRN: [id number]    Savanna Rehman is 79 year old year old female with a history of recurrent UTI, seen by Dr. Wilson most recently on 3/10/2020. She is scheduled for cystoscopy 4/27/20 to assess for this although this may be delayed given COVID. Patient called today to discuss symptoms/culture results. Her last UTI was 2-3 weeks ago and was treated with a course of Keflex. Now she thinks she may be forming a new UTI since she has abdominal sensitivity which she say she experiences sometimes with UTIs. She thinks she may also have very mild bilateral flank pain. Denies fevers, chills, nausea, vomiting, weakness. She dropped off a urine sample this morning at [facility] for culture and this is pending. Per patient, UA was unable to be obtained due to low volume of urine in sample, so this is not available.    Plans:  - Patient and I formulated a plan which includes waiting for the culture results to be available, likely in the next 2-3 days, before starting abx  - If experiencing signs of worsening infection or systemic involvement such as temperatures > 101.4, chills, worsening flank pain, an inability to keep food or fluids down, worsening urinary symptoms, she will call back or seek urgent medical attention  - Otherwise plan is for her to follow up on her culture results on Monday via phone  - Patient advised that because this is an encounter performed over the telephone, I am not able to perform a physical exam and thus any advice given is limited in nature and may not be completely informed.  The patient accepts this risk and if they have concerns with it they should be seen and evaluated in person by a medical professional.     - Patient expressed understanding and was happy with this plan.    --    Olu Newberry MD  Urology PGY2

## 2020-04-12 LAB
BACTERIA SPEC CULT: ABNORMAL
SPECIMEN SOURCE: ABNORMAL

## 2020-04-13 ENCOUNTER — TELEPHONE (OUTPATIENT)
Dept: UROLOGY | Facility: CLINIC | Age: 80
End: 2020-04-13

## 2020-04-13 DIAGNOSIS — N39.0 URINARY TRACT INFECTION: Primary | ICD-10-CM

## 2020-04-13 RX ORDER — CEPHALEXIN 500 MG/1
500 TABLET ORAL 2 TIMES DAILY
Qty: 20 TABLET | Refills: 0 | Status: SHIPPED | OUTPATIENT
Start: 2020-04-13 | End: 2020-07-10

## 2020-04-13 NOTE — TELEPHONE ENCOUNTER
----- Message from Deisy Wilson MD sent at 4/13/2020  7:32 AM CDT -----  Please inform patient of results    Please verify her allergies and pharmacy    Recommend cephalexin 500mg po BID x 10 days    Thanks

## 2020-04-14 ENCOUNTER — PRE VISIT (OUTPATIENT)
Dept: SURGERY | Facility: CLINIC | Age: 80
End: 2020-04-14

## 2020-04-15 ENCOUNTER — ANTICOAGULATION THERAPY VISIT (OUTPATIENT)
Dept: ANTICOAGULATION | Facility: CLINIC | Age: 80
End: 2020-04-15
Payer: COMMERCIAL

## 2020-04-15 DIAGNOSIS — Z79.01 LONG TERM (CURRENT) USE OF ANTICOAGULANTS: ICD-10-CM

## 2020-04-15 DIAGNOSIS — I48.20 CHRONIC ATRIAL FIBRILLATION (H): ICD-10-CM

## 2020-04-15 LAB — INR PPP: 1.8 (ref 0.9–1.1)

## 2020-04-15 NOTE — PROGRESS NOTES
ANTICOAGULATION FOLLOW-UP CLINIC VISIT    Patient Name:  Savanna Rehman  Date:  4/15/2020  Contact Type:  Telephone/ CLIFFORD Anthony    SUBJECTIVE:  Patient Findings     Positives:   Change in health (UTI DX 2020), Change in medications (started cephalexin 500mg BID 10 days 2020), Change in diet/appetite (1/2 c cooked spinach 2020)    Comments:   Assessed for S/S bleeding, clotting, medication, diet, health, activity and alcohol changes.          Clinical Outcomes     Negatives:   Major bleeding event, Thromboembolic event, Anticoagulation-related hospital admission, Anticoagulation-related ED visit, Anticoagulation-related fatality    Comments:   Assessed for S/S bleeding, clotting, medication, diet, health, activity and alcohol changes.             OBJECTIVE    INR   Date Value Ref Range Status   04/15/2020 1.8 (A) 0.90 - 1.10 Final       ASSESSMENT / PLAN  INR assessment SUB    Recheck INR In: 1 WEEK    INR Location Homecare INR      Anticoagulation Summary  As of 4/15/2020    INR goal:   2.0-3.0   TTR:   79.4 % (5.5 mo)   INR used for dosin.8! (4/15/2020)   Warfarin maintenance plan:   3.75 mg (2.5 mg x 1.5) every Mon; 2.5 mg (2.5 mg x 1) all other days   Full warfarin instructions:   4/15: 3.75 mg; Otherwise 3.75 mg every Mon; 2.5 mg all other days   Weekly warfarin total:   18.75 mg   Plan last modified:   Azul Whitaker RN (2020)   Next INR check:   2020   Priority:   High   Target end date:   Indefinite    Indications    Permanent atrial fibrillation (Resolved) [I48.21]  Chronic atrial fibrillation [I48.20]  Long term (current) use of anticoagulants [Z79.01]             Anticoagulation Episode Summary     INR check location:       Preferred lab:   EXTERNAL LAB    Send INR reminders to:   NIKKI CHASE    Comments:   Home care       Anticoagulation Care Providers     Provider Role Specialty Phone number    Patti Suárez MD Responsible Internal Medicine  [phone]            See the Encounter Report to view Anticoagulation Flowsheet and Dosing Calendar (Go to Encounters tab in chart review, and find the Anticoagulation Therapy Visit)    Evelin did test INR 04/13/2020 during HC visit, was 2.3, probably lower despite infection due to partial reversal with spinach 1-2 days before.  Will give bump today, resume regular dosing and recheck in 1 week.      Jayleen Oconnor [facility]

## 2020-04-22 ENCOUNTER — TELEPHONE (OUTPATIENT)
Dept: INTERNAL MEDICINE | Facility: CLINIC | Age: 80
End: 2020-04-22

## 2020-04-22 DIAGNOSIS — Z79.01 LONG TERM (CURRENT) USE OF ANTICOAGULANTS: ICD-10-CM

## 2020-04-22 DIAGNOSIS — I48.20 CHRONIC ATRIAL FIBRILLATION (H): ICD-10-CM

## 2020-04-22 DIAGNOSIS — N39.0 URINARY TRACT INFECTION: Primary | ICD-10-CM

## 2020-04-22 LAB — INR PPP: 2.6 (ref 0.9–1.1)

## 2020-04-22 NOTE — TELEPHONE ENCOUNTER
ANTICOAGULATION MANAGEMENT     Patient Name:  Savanna Rehman  Date:  2020    ASSESSMENT /SUBJECTIVE:    Today's INR result of 2.6 is therapeutic. Goal INR of 2.0-3.0      Warfarin dose taken: Warfarin taken as previously instructed    Diet: Change in alcohol intake may be affecting INR , less than normal    Medication changes/ interactions: Interaction between cephelexin and warfarin may be affecting INR    Previous INR: Subtherapeutic     S/S of bleeding or thromboembolism: No    New injury or illness: Yes: UTI, patient reports still having symptoms, seeing PCP tomorrow    Upcoming surgery, procedure or cardioversion: No    Additional findings: None      PLAN:    Spoke with Raj home care nurse, regarding INR result and instructed:     Warfarin Dosing Instructions: Continue your current warfarin dose 3.75 mg on  and 2.5  mg all other days    Instructed patient to follow up no later than: 139347 due to ongoing UTI symptoms and antibiotic use  Orders given to  Homecare nurse/facility to recheck    Education provided: Potential interaction between warfarin and cephalexin, Importance of notifying clinic for changes in medications, Monitoring for bleeding signs and symptoms, When to seek medical attention/emergency care and Importance of notifying clinic for diarrhea, nausea/vomiting, reduced intake and/or illness      Raj home care nurse, verbalizes understanding and agrees to warfarin dosing plan.    Instructed to call the Anticoagulation Clinic for any changes, questions or concerns. (#[phone])        OBJECTIVE:  INR   Date Value Ref Range Status   2020 2.6 (A) 0.90 - 1.10 Final             Anticoagulation Summary  As of 2020    INR goal:   2.0-3.0   TTR:   79.3 % (5.7 mo)   INR used for dosin.6 (2020)   Warfarin maintenance plan:   3.75 mg (2.5 mg x 1.5) every Mon; 2.5 mg (2.5 mg x 1) all other days   Full warfarin instructions:   3.75 mg every Mon; 2.5 mg all other  days   Weekly warfarin total:   18.75 mg   Plan last modified:   Azul Whitaker, RN (2/20/2020)   Next INR check:   4/28/2020   Priority:   High   Target end date:   Indefinite    Indications    Permanent atrial fibrillation (Resolved) [I48.21]  Chronic atrial fibrillation [I48.20]  Long term (current) use of anticoagulants [Z79.01]             Anticoagulation Episode Summary     INR check location:       Preferred lab:   EXTERNAL LAB    Send INR reminders to:   NIKKI CHASE    Comments:   Home care       Anticoagulation Care Providers     Provider Role Specialty Phone number    Patti Suárez MD [facility] Internal Medicine [phone]

## 2020-04-23 ENCOUNTER — ALLIED HEALTH/NURSE VISIT (OUTPATIENT)
Dept: UROLOGY | Facility: CLINIC | Age: 80
End: 2020-04-23
Payer: COMMERCIAL

## 2020-04-23 ENCOUNTER — TELEPHONE (OUTPATIENT)
Dept: UROLOGY | Facility: CLINIC | Age: 80
End: 2020-04-23

## 2020-04-23 DIAGNOSIS — N39.0 URINARY TRACT INFECTION: ICD-10-CM

## 2020-04-23 LAB
ALBUMIN UR-MCNC: NEGATIVE MG/DL
APPEARANCE UR: CLEAR
BILIRUB UR QL STRIP: ABNORMAL
COLOR UR AUTO: YELLOW
GLUCOSE UR STRIP-MCNC: NEGATIVE MG/DL
HGB UR QL STRIP: ABNORMAL
KETONES UR STRIP-MCNC: NEGATIVE MG/DL
LEUKOCYTE ESTERASE UR QL STRIP: NEGATIVE
NITRATE UR QL: NEGATIVE
PH UR STRIP: 5.5 PH (ref 5–7)
SOURCE: ABNORMAL
SP GR UR STRIP: >1.03 (ref 1–1.03)
UROBILINOGEN UR STRIP-ACNC: 2 EU/DL (ref 0.2–1)

## 2020-04-23 PROCEDURE — 87086 URINE CULTURE/COLONY COUNT: CPT | Performed by: UROLOGY

## 2020-04-23 PROCEDURE — 81003 URINALYSIS AUTO W/O SCOPE: CPT | Mod: QW | Performed by: UROLOGY

## 2020-04-23 PROCEDURE — 99211 OFF/OP EST MAY X REQ PHY/QHP: CPT

## 2020-04-23 NOTE — PROGRESS NOTES
Patient presents to clinic for catheterized urine sample. Patient's urethra was cleansed with iodine and in sterile fashion was catheterized with a 14 English straight-tip catheter that was well-lubricated. Patient tolerated procedure well and after sample was collected catheter was removed with ease. No complaints voiced by patient. Sample was sent to lab for analysis and culture.     Savanna Rehman comes into clinic today at the request of Dr. Wilson Ordering Provider for UA/UC cath specimen.    This service provided today was under the supervising provider of the day Dr. Austin, who was available if needed.    Philly Oconnor LPN

## 2020-04-23 NOTE — TELEPHONE ENCOUNTER
Patient calling to report her symptoms of UTI that very unique to this patient, she still has. She continues to have pain just beneath her belly button and slight nausea. She denies urinary frequency, urinary urgency, chills or fever. She knows she still has the infection per patient. Patient is on her last day of cephalexin. Her nurse is coming over today, so she would like to have her catheterize her for an urine specimen. Consulted Dr. Wilson on this and would like the patient to get another especially if it will be catheterized. When the nurse arrived she was not able to catheterize this patient due to no supplies to perform the task at hand. Patient agreed to go to the [facility] to have cathing done. Appointment was made for 4/23/20  Adelaida Liu RN   Care Coordinator Urology

## 2020-04-24 LAB
BACTERIA SPEC CULT: NO GROWTH
Lab: NORMAL
SPECIMEN SOURCE: NORMAL

## 2020-04-27 ENCOUNTER — TELEPHONE (OUTPATIENT)
Dept: UROLOGY | Facility: CLINIC | Age: 80
End: 2020-04-27

## 2020-04-27 NOTE — TELEPHONE ENCOUNTER
Patient notified of her results of her negative urine culture done last week.  Patient is still having pain over her abdomen in which she rates a 7-8/10. She describes her pain as being dull and continuous. She says it is worse when leans forward in her chair to pick something to adjust anything below the waist. She denies chills, fever, nausea or vomiting. She also has sores on her labia, inside of her vagina, and mouth.  She feels she may need something to combat the sores also. Patient also would like to do another culture.   Let me know what your thoughts are  Adelaida Liu, CLIFFORD   Care Coordinator Urology

## 2020-04-27 NOTE — TELEPHONE ENCOUNTER
----- Message from Adelaida Liu RN sent at 4/23/2020  8:09 AM CDT -----  Check urine  ----- Message -----  From: Iselaashley Chela  Sent: 4/22/2020  11:26 AM CDT  To: Adelaida Liu RN    Yes, I got her scheduled for tomorrow and called her to confirm.  ----- Message -----  From: Adelaida Liu RN  Sent: 4/22/2020  11:15 AM CDT  To: Deisy Mccarthy RN, #    Good Morning,  Patient would like to know if she could come to clinic tomorrow for a catheterized specimen at  1 pm 4/23/20 for nurse visit?  She sees Dr Wilson at your clinic and does not want to drive to the [facility].  I offered.  Let me know   Thanks  Adelaida Liu RN   Care Coordinator Urology

## 2020-04-28 ENCOUNTER — TELEPHONE (OUTPATIENT)
Dept: INTERNAL MEDICINE | Facility: CLINIC | Age: 80
End: 2020-04-28

## 2020-04-28 DIAGNOSIS — Z79.01 LONG TERM (CURRENT) USE OF ANTICOAGULANTS: ICD-10-CM

## 2020-04-28 DIAGNOSIS — I48.20 CHRONIC ATRIAL FIBRILLATION (H): ICD-10-CM

## 2020-04-28 LAB — INR PPP: 2.2 (ref 0.9–1.1)

## 2020-04-28 NOTE — TELEPHONE ENCOUNTER
ANTICOAGULATION MANAGEMENT     Patient Name:  Savanna Rehman  Date:  2020    ASSESSMENT /SUBJECTIVE:    Today's INR result of 2.2 is therapeutic. Goal INR of 2.0-3.0      Warfarin dose taken: Missed dose(s) may be affecting INR    Diet: No new diet changes affecting INR    Medication changes/ interactions: No new medications/supplements affecting INR    Previous INR: Therapeutic     S/S of bleeding or thromboembolism: No    New injury or illness: No    Upcoming surgery, procedure or cardioversion: No    Additional findings: Pt is having one tooth extracted for certain tomorrow, possibly two teeth. States she was not given any instruction as to what range her INR needed to be in prior to her extraction, so she held her warfarin dose yesterday ()      PLAN:     Spoke with Raj ([facility] RN) regarding INR result and instructed:     Warfarin Dosing Instructions: Continue your current warfarin dose of 3.75mg Mon; 2.5mg all other days    Instructed patient to follow up no later than: 1 week  Orders given to  Homecare nurse/facility to recheck    Education provided: Target INR goal and significance of current INR result, Importance of taking warfarin as instructed and instructed to monitor for s/s of unusual or prolonged bleeding after tooth extraction tomorrow and call [facility] w/ questions      Raj and Savanna verbalize understanding and agree to warfarin dosing plan.    Instructed to call the Anticoagulation Clinic for any changes, questions or concerns. (#[phone])        OBJECTIVE:  INR   Date Value Ref Range Status   2020 2.2 (A) 0.90 - 1.10 Final             Anticoagulation Summary  As of 2020    INR goal:   2.0-3.0   TTR:   80.0 % (5.9 mo)   INR used for dosin.2 (2020)   Warfarin maintenance plan:   3.75 mg (2.5 mg x 1.5) every Mon; 2.5 mg (2.5 mg x 1) all other days   Full warfarin instructions:   3.75 mg every Mon; 2.5 mg all other days   Weekly warfarin total:   18.75 mg   Plan  last modified:   Azul Whitaker, RN (2/20/2020)   Next INR check:   5/5/2020   Priority:   High   Target end date:   Indefinite    Indications    Permanent atrial fibrillation (Resolved) [I48.21]  Chronic atrial fibrillation [I48.20]  Long term (current) use of anticoagulants [Z79.01]             Anticoagulation Episode Summary     INR check location:       Preferred lab:   EXTERNAL LAB    Send INR reminders to:   NIKKI CHASE    Comments:   Home care       Anticoagulation Care Providers     Provider Role Specialty Phone number    Patti Suárez MD [facility] Internal Medicine [phone]

## 2020-04-30 ENCOUNTER — TELEPHONE (OUTPATIENT)
Dept: CARDIOLOGY | Facility: CLINIC | Age: 80
End: 2020-04-30

## 2020-04-30 NOTE — TELEPHONE ENCOUNTER
[facility] Call Center    Phone Message    May a detailed message be left on voicemail: yes     Reason for Call: Other: Pt is following up on her pain in her stomach.  Pt states she's been talking with Adelaida and is still waiting for a call.  Please follow up with the Pt.      Action Taken: Message routed to:  [facility] ub uro    Travel Screening: Not Applicable

## 2020-04-30 NOTE — TELEPHONE ENCOUNTER
"Voicemail received from patient calling to say that she received a notice that she is due for an office visit and an echo. Pt requests a callback to discuss as she states \"nothing is wrong with her\" and she doesn't understand why she needs it.     Returned patient's call to discuss. Reviewed that Dr Salgado had placed orders for f/up in August with Rosanna Gramajo, as well as for an echo and BMP. Reviewed that echo is for routine screening for her CHF and afib, as she has not had one in 2yrs. Discussed that at this time, all routine tests are being evaluated for urgency or postponed, and that we would notify her closer to her appointment if the echo is needed. Reviewed that the schedule for August is not open yet, and that scheduling should be calling her sometime in May to set this up. Phone number for scheduling provided should she not hear back by the middle of the month. Discussed that BMP would not likely be needed as she just had one drawn in March which was stable. Pt verbalized understanding, very appreciative for the call back.   "

## 2020-05-01 ENCOUNTER — ALLIED HEALTH/NURSE VISIT (OUTPATIENT)
Dept: UROLOGY | Facility: CLINIC | Age: 80
End: 2020-05-01
Payer: COMMERCIAL

## 2020-05-01 DIAGNOSIS — N39.0 URINARY TRACT INFECTION: ICD-10-CM

## 2020-05-01 DIAGNOSIS — N39.0 URINARY TRACT INFECTION: Primary | ICD-10-CM

## 2020-05-01 LAB
ALBUMIN UR-MCNC: NEGATIVE MG/DL
APPEARANCE UR: ABNORMAL
BILIRUB UR QL STRIP: NEGATIVE
COLOR UR AUTO: ABNORMAL
GLUCOSE UR STRIP-MCNC: NEGATIVE MG/DL
HGB UR QL STRIP: ABNORMAL
KETONES UR STRIP-MCNC: NEGATIVE MG/DL
LEUKOCYTE ESTERASE UR QL STRIP: NEGATIVE
NITRATE UR QL: NEGATIVE
PH UR STRIP: 5 PH (ref 5–7)
SOURCE: ABNORMAL
SP GR UR STRIP: 1.02 (ref 1–1.03)
UROBILINOGEN UR STRIP-ACNC: 0.2 EU/DL (ref 0.2–1)

## 2020-05-01 PROCEDURE — 81003 URINALYSIS AUTO W/O SCOPE: CPT | Mod: QW | Performed by: UROLOGY

## 2020-05-01 PROCEDURE — 87186 SC STD MICRODIL/AGAR DIL: CPT | Performed by: UROLOGY

## 2020-05-01 PROCEDURE — 87088 URINE BACTERIA CULTURE: CPT | Performed by: UROLOGY

## 2020-05-01 PROCEDURE — 87086 URINE CULTURE/COLONY COUNT: CPT | Performed by: UROLOGY

## 2020-05-01 PROCEDURE — 99207 ZZC NO CHARGE NURSE ONLY: CPT

## 2020-05-01 NOTE — PROGRESS NOTES
Patient came in today for a cathed UA/UC sample at the request of Dr. Wilson.    Sample was retrieved without difficulty using a 14 Fr. Catheter.    Patient was unable to collect a stool sample at home for the C. Diff test, however she has sterile cups at home to collect a sample when she is able and will drop it off at the [facility] in [place].    Dawna Mckeon, EMT

## 2020-05-01 NOTE — TELEPHONE ENCOUNTER
"Patient called today saying she has diarrhea all last and that's when it started. Patient still has the pain in her belly below her belly button that she says is getting worse and she rates a 8/10 now. She is having some burining with urination also. She does have dysuria that she explains as \"peeing in tiny amounts\".   She denies fever but does has chills. She does have vomiting also.  Patient will go in for her catheterized urine today at the [facility] if we can schedule something soon  Sent message to that clinic  Adelaida Liu RN   Care Coordinator Urology     "

## 2020-05-02 ENCOUNTER — NURSE TRIAGE (OUTPATIENT)
Dept: NURSING | Facility: CLINIC | Age: 80
End: 2020-05-02

## 2020-05-02 LAB
BACTERIA SPEC CULT: ABNORMAL
Lab: ABNORMAL
SPECIMEN SOURCE: ABNORMAL

## 2020-05-02 NOTE — TELEPHONE ENCOUNTER
Pt calling to check on the status of her UA/UC.  Results given from UA and preliminary UC report.  Sensitivity in process.     Pt states she spoke with her Urology clinic yesterday, had concerns for a UTI, UA/UC ordered.  Pt states she told her clinic that she was having vomiting, diarrhea, abdominal pain, and dysuria.       Vomiting and diarrhea x 2 days.  Dysuria and abdominal pain present x weeks.     Today, pt also reports low back pain, dizziness, and weakness.  Pt states the weakness and dizziness are present because she has not been wearing her CPAP machine.  Pt drinking small amounts and not eating much.  Pt states she does not want to push for an antibiotic to be ordered if we should wait for the sensitivity to come back.  She also expressed her concern that she does not want to go to the ED unless necessary but that she is worried her symptoms might not be from a UTI alone.     Recent hx of UTI - was on two rounds of Keflex since 03/27.  Pt states her symptoms did not improve, she has been off medication now since 04/23.      Triage disposition:  Page provider  1:05PM:  [facility] answering service contacted, a page was sent to Urology MD Stevie Tapia to call writer at [phone].   1:07PM:  Dr. Tapia called, states he can prescribe an antibiotic for patient based upon the sensitivity from 04/11.  He advised that d/t pt's GI symptoms, writer should page PCP on call.     Writer called pt back to update her.  She states she does not think we need to contact PCP because her symptoms seem to have improved since initial conversation with writer.  Pt states she might want to wait until Monday to speak with Dr. Wilson.     1:17PM:  Smart Web used to page on-call and PCP MD Lauren to call writer.   1:29PM:  No response from provider, a second page was sent via Smart Web.   1:50PM:  No response from provider, writer contacted [facility] page  who connected with Dr. Lauren who will call writer back.    1:55PM:  Dr. Lauren called, advised pt to seek care in the ED for possible dehydration, pyelonephritis.   2:00PM:  Writer called pt back, she verbalized her understanding.  Pt states she does not want to seek care in the ED due to COVID concerns.     Per pt request, writer will route a message to Dr. Wilson.      Griselda Miller RN/MIKE     Reason for Disposition    [1] Follow-up call from patient regarding patient's clinical status AND [2] information urgent    Additional Information    Negative: Lab calling with strep throat test results and triager can call in prescription    Negative: Lab calling with urinalysis test results and triager can call in prescription    Negative: Medication questions    Negative: ED call to PCP    Negative: Physician call to PCP    Negative: Call about patient who is currently hospitalized    Negative: Lab or radiology calling with CRITICAL test results    Negative: [1] Prescription not at pharmacy AND [2] was prescribed today by PCP    Protocols used: [facility] - [facility]

## 2020-05-02 NOTE — TELEPHONE ENCOUNTER
MD on call    UTI with EColi. Sensitivity is pending.  Pt received Rx Cephalexin from dr Wilson, urology  Pt with multiple Abx allergies: Lequin, Nitrofurantoin, Bacitracin  Bactrim would be an option but she has GI symptoms   I suspect pyelonephritis and dehydration   SHe needs to go to ER asap.  I discussed her case with CLIFFORD Villalobos who will call the patient again   Thank you, Alfie Lauren MD  Internal Medicine

## 2020-05-03 NOTE — TELEPHONE ENCOUNTER
The patient called asking to speak with Griselda.  I told her Griselda was on a call and could call her back within about 15 minutes she got very upset so I read her the note from her primary doctor.  She started to yell and said she would not go to the ER if her life depended on it.

## 2020-05-03 NOTE — TELEPHONE ENCOUNTER
Pt calling to check on the UC sensitivities.  UC sensitivities back, pt plans to discuss these results with Dr. Wilson tomorrow.      See message below.     Griselda Miller RN/FNA

## 2020-05-04 ENCOUNTER — TELEPHONE (OUTPATIENT)
Dept: UROLOGY | Facility: CLINIC | Age: 80
End: 2020-05-04

## 2020-05-04 DIAGNOSIS — Z87.440 PERSONAL HISTORY OF URINARY TRACT INFECTION: Primary | ICD-10-CM

## 2020-05-04 RX ORDER — GENTAMICIN 40 MG/ML
80 INJECTION, SOLUTION INTRAMUSCULAR; INTRAVENOUS EVERY 24 HOURS
Qty: 6 ML | Refills: 0
Start: 2020-05-05 | End: 2020-07-10

## 2020-05-04 NOTE — TELEPHONE ENCOUNTER
Evelin called back and can give the injection of gentamicin as ordered by Dr Wilson in the am  The order will be faxed tomorrow [phone]  Patient notified of plan  Adelaida Liu, RN   Care Coordinator Urology

## 2020-05-04 NOTE — TELEPHONE ENCOUNTER
Made contact with the patient to let her know she can not be treated simply due to all her allergies and the fact the bacteria is complicated by it's resistance to many drugs, so we need assistance from Dr Wilson for guidance.  Patient verbalized understanding.    Adelaida Liu RN   Care Coordinator Urology

## 2020-05-04 NOTE — TELEPHONE ENCOUNTER
[facility] Call Center    Phone Message    May a detailed message be left on voicemail: yes     Reason for Call: Other: patient calling stating she has been talking to Adelaida and is requesting a return call to discuss UTI medication. Please call thank you.      Action Taken: Message routed to:  [facility] Urology    Travel Screening: Not Applicable

## 2020-05-04 NOTE — TELEPHONE ENCOUNTER
Patient still does not want to come in for injections. She has enlisted the help of the nurse at her building. I do not have any information about this nurse other than her name and where she works. I called and left her a message for to call me. I told the patient a stool sample was needed prior to starting this medication. Patient says she call her primary and he wants a colonoscopy done, but they are not being done due to Covid-19. Patient was also told about needing more investigation  infections since had 3 in the last month.  Patient says she does not have to go currently, so she is unable to produce a stool sample today.    Adelaida Liu, RN   Care Coordinator Urology

## 2020-05-05 ENCOUNTER — TELEPHONE (OUTPATIENT)
Dept: INTERNAL MEDICINE | Facility: CLINIC | Age: 80
End: 2020-05-05

## 2020-05-05 ENCOUNTER — PRE VISIT (OUTPATIENT)
Dept: UROLOGY | Facility: CLINIC | Age: 80
End: 2020-05-05

## 2020-05-05 DIAGNOSIS — I48.20 CHRONIC ATRIAL FIBRILLATION (H): ICD-10-CM

## 2020-05-05 DIAGNOSIS — N39.0 URINARY TRACT INFECTION: Primary | ICD-10-CM

## 2020-05-05 DIAGNOSIS — Z79.01 LONG TERM (CURRENT) USE OF ANTICOAGULANTS: ICD-10-CM

## 2020-05-05 LAB — INR PPP: 2.2 (ref 0.9–1.1)

## 2020-05-05 RX ORDER — GENTAMICIN 40 MG/ML
INJECTION, SOLUTION INTRAMUSCULAR; INTRAVENOUS
Qty: 8 ML | Refills: 0 | Status: SHIPPED | OUTPATIENT
Start: 2020-05-05 | End: 2020-07-10

## 2020-05-05 NOTE — TELEPHONE ENCOUNTER
ANTICOAGULATION MANAGEMENT     Patient Name:  Savanna Rehman  Date:  2020    ASSESSMENT /SUBJECTIVE:    Today's INR result of 2.2 is therapeutic. Goal INR of 2.0-3.0      Warfarin dose taken: Warfarin taken as previously instructed    Diet: No new diet changes affecting INR    Medication changes/ interactions: No interaction expected between Gentamycin and warfarin (Pt to have daily IM doses x3 starting today, )    Previous INR: Therapeutic     S/S of bleeding or thromboembolism: No    New injury or illness: Yes: UTI    Upcoming surgery, procedure or cardioversion: No    Additional findings: Pt did NOT have tooth extraction as scheduled last week. HC RN will inform [facility] when pt reschedules w/ any warfarin related instructions from dentist    PLAN:    Spoke with Raj (HC RN) regarding INR result and instructed:     Warfarin Dosing Instructions: Continue your current warfarin dose of 3.75mg Mon; 2.5mg all other days    Instructed patient to follow up no later than: 1 week  Orders given to  Homecare nurse/facility to recheck    Education provided: Target INR goal and significance of current INR result and Importance of notifying clinic of upcoming surgeries and procedures 2 weeks in advance      Raj verbalizes understanding and agrees to warfarin dosing plan.    Instructed to call the Anticoagulation Clinic for any changes, questions or concerns. (#[phone])        OBJECTIVE:  INR   Date Value Ref Range Status   2020 2.2 (A) 0.90 - 1.10 Final             Anticoagulation Summary  As of 2020    INR goal:   2.0-3.0   TTR:   80.7 % (6.2 mo)   INR used for dosin.2 (2020)   Warfarin maintenance plan:   3.75 mg (2.5 mg x 1.5) every Mon; 2.5 mg (2.5 mg x 1) all other days   Full warfarin instructions:   3.75 mg every Mon; 2.5 mg all other days   Weekly warfarin total:   18.75 mg   Plan last modified:   Azul Whitaker RN (2020)   Next INR check:   2020   Priority:   High    Target end date:   Indefinite    Indications    Permanent atrial fibrillation (Resolved) [I48.21]  Chronic atrial fibrillation [I48.20]  Long term (current) use of anticoagulants [Z79.01]             Anticoagulation Episode Summary     INR check location:       Preferred lab:   EXTERNAL LAB    Send INR reminders to:   NIKKI CHASE    Comments:   Home care       Anticoagulation Care Providers     Provider Role Specialty Phone number    Patti Suárez MD [facility] Internal Medicine [phone]

## 2020-05-05 NOTE — TELEPHONE ENCOUNTER
Reason for visit: discuss plan for Omar     Relevant information: pt has some drug resistance and multiple allergies    Records/imaging/labs/orders: records available    Pt called: no need for a call

## 2020-05-06 ENCOUNTER — TELEPHONE (OUTPATIENT)
Dept: UROLOGY | Facility: CLINIC | Age: 80
End: 2020-05-06

## 2020-05-06 NOTE — TELEPHONE ENCOUNTER
Patient would like to have her procedure before she has another urinary tract infection since surgeries can be done now. I explained to the patient that yes the Governor said elective surgeries could be done and the medical community would have to plan on how this would be and we have a plan we are following. There are many that have to be done before yours. We would appreciate a little more patience.  Please trust no one will forget her surgery. Dr. Wilson is leading the need for surgery and will call when it's time to schedule.  It's okay to discuss at your appointment on Monday     Adelaida Liu RN   Care Coordinator Urology

## 2020-05-06 NOTE — TELEPHONE ENCOUNTER
----- Message from Chela Ayoub sent at 5/6/2020 10:01 AM CDT -----  Savanna [place] called with questions regarding her surgery timing. Can you please give her a call?    Thanks!    Chela

## 2020-05-07 ENCOUNTER — DOCUMENTATION ONLY (OUTPATIENT)
Dept: CARE COORDINATION | Facility: CLINIC | Age: 80
End: 2020-05-07

## 2020-05-11 ENCOUNTER — TELEPHONE (OUTPATIENT)
Dept: UROLOGY | Facility: CLINIC | Age: 80
End: 2020-05-11

## 2020-05-11 ENCOUNTER — VIRTUAL VISIT (OUTPATIENT)
Dept: UROLOGY | Facility: CLINIC | Age: 80
End: 2020-05-11
Payer: COMMERCIAL

## 2020-05-11 DIAGNOSIS — N39.0 RECURRENT UTI: Primary | ICD-10-CM

## 2020-05-11 ASSESSMENT — PAIN SCALES - GENERAL: PAINLEVEL: MODERATE PAIN (5)

## 2020-05-11 NOTE — PROGRESS NOTES
"May 11, 2020    Savanna Rehman is a 79 year old female who is being evaluated via a billable telephone visit.      The patient has been notified of following:     \"This telephone visit will be conducted via a call between you and your physician/provider. We have found that certain health care needs can be provided without the need for a physical exam.  This service lets us provide the care you need with a short phone conversation.  If a prescription is necessary we can send it directly to your pharmacy.  If lab work is needed we can place an order for that and you can then stop by our lab to have the test done at a later time.    Telephone visits are billed at different rates depending on your insurance coverage. During this emergency period, for some insurers they may be billed the same as an in-person visit.  Please reach out to your insurance provider with any questions.    If during the course of the call the physician/provider feels a telephone visit is not appropriate, you will not be charged for this service.\"    Patient has given verbal consent for Telephone visit?  Yes    What phone number would you like to be contacted at? [phone]    How would you like to obtain your AVS? Mail a copy    Patient has elected a telephone call for today's follow up.  Called patient today and verified her name and date of birth prior to discussion.    Today's telephone visit is to discuss her bladder symptoms.  States that she isn't much better since the course of gentamicin.  She continues to have her abdominal pain and now constipated.  She wants to get back in to her GI doctor, considering a colonoscopy since she is about due for clinic    She finally is getting to see the dentist later this week, needs to have tooth extraction in the hospital.    Patient denies any changes to her past medical, surgical or social history.  Patient denies any changes to her medications or allergies    A/P:  Savanna Rehman is a 79 year " old F with Omar and GI symptoms    GI evaluation given her history of GI surgery    Will get her cystoscopy with possible biopsy scheduled in the near future given her long standing UTIs.  We did discuss again that with COVID her risks of coming to the hospital for surgery are certainly increased    She needs to have her teeth pulled in the OR so will consider combination case, she will let us know after she sees the oral surgeon later this week    17 minutes were spent in patient care today    Deisy Wilson MD MPH   of Urology    CC  Patient Care Team:  Patti Suárez MD as PCP - General (Internal Medicine)  Patti Suárez MD as Assigned PCP  SELF, REFERRED

## 2020-05-11 NOTE — NURSING NOTE
Chief Complaint   Patient presents with     Follow Up     Discuss plan for Omar       not currently breastfeeding. There is no height or weight on file to calculate BMI.    Patient Active Problem List   Diagnosis     Angioedema     Morbid obesity (H)     CRISTIANA (obstructive sleep apnea) - CPAP     Recurrent UTI     Urgency-frequency syndrome     Urgency incontinence     Candidiasis of skin     Chronic atrial fibrillation     Long term (current) use of anticoagulants     Hyperlipidemia LDL goal <100     Vitamin D deficiency     Spider veins     Gout     Gastroesophageal reflux disease     Anemia       Allergies   Allergen Reactions     Augmented Betamethasone Diprop [Betamethasone] Other (See Comments)     Severe yeast infection     Petroleum Jelly [Petrolatum] Anaphylaxis     Rash and swelling     Shellfish-Derived Products Anaphylaxis     Tongue swelling     Aspirin Swelling     tiongue swelling     Bacitracin      Rash swelling     Coumadin [Warfarin] Swelling     Leg swelling     Darvon [Propoxyphene] Swelling     Throat closes     Dilaudid [Hydromorphone]      Levaquin [Levofloxacin] Swelling     Tongue swelling     Neosporin [Neomycin-Polymyx-Gramicid] Swelling     rash     Nitrofurantoin      SOB, GI upset,     Oxycodone      Severe itching     Percodan [Oxycodone-Aspirin]      Severe itching     Tramadol      Vicodin [Hydrocodone-Acetaminophen]      Severe itching       Xarelto [Rivaroxaban]      Adhesive Tape Rash     Band aids      Codeine Rash       Current Outpatient Medications   Medication Sig Dispense Refill     ACE/ARB/ARNI NOT PRESCRIBED (INTENTIONAL) Please choose reason not prescribed, below       alcohol swab prep pads Use to swab area of injection/chandrakant as directed. 100 each 3     B Complex-C (SUPER B COMPLEX PO) Take 1 tablet by mouth At Bedtime       BETA BLOCKER NOT PRESCRIBED (INTENTIONAL) Beta Blocker not prescribed intentionally due to Hypotension (SBP < 90)       Biotin 5000 MCG TABS Take  5,000 mcg by mouth At Bedtime       blood glucose (NO BRAND SPECIFIED) test strip Use to test blood sugar 1 time daily. To accompany: Blood Glucose Monitor Brands: Contour Next. 100 strip 1     blood glucose calibration (NO BRAND SPECIFIED) solution To accompany: Blood Glucose Monitor Brands: per insurance. 1 Bottle 3     blood glucose monitoring (NO BRAND SPECIFIED) meter device kit Use to test blood sugar 1 times daily. Preferred blood glucose meter OR supplies to accompany: Blood Glucose Monitor Brands: per insurance. 1 kit 0     cholecalciferol (VITAMIN D3) 5000 units (125 mcg) capsule Take 5,000 Units by mouth At Bedtime       glipiZIDE (GLUCOTROL XL) 2.5 MG 24 hr tablet Take 2.5 mg by mouth At Bedtime       order for DME Oxygen 2 Li/min  at night bleed with CPAP       oxybutynin (DITROPAN) 5 MG tablet Take 1 tablet (5 mg) by mouth 2 times daily Clarified as immediate release with [facility]./Patient 90 tablet 0     ranitidine (ZANTAC) 150 MG tablet Take 150 mg by mouth At Bedtime       thin (NO BRAND SPECIFIED) lancets Use with lanceting device to check glucose once a day. To accompany: Blood Glucose Monitor Brands: per insurance. 100 each 3     warfarin ANTICOAGULANT (COUMADIN) 2.5 MG tablet Take 3.75mg(1 1/2 tablets) Mon/Wed/Fri and 2.5mg (1 tablet) the rest of the days of the week. Or as directed by the Anticoagulation Clinic 126 tablet 0     gentamicin (GARAMYCIN) 40 MG/ML injection Gentamicin 160 mg IM day one 4 mL  Gentamicin   80 mg IM day two 2 mL  Gentamicin   80 mg IM day three 2 mL (Patient not taking: Reported on 2020) 8 mL 0       Social History     Tobacco Use     Smoking status: Former Smoker     Packs/day: 0.50     Years: 50.00     Pack years: 25.00     Types: Cigarettes     Last attempt to quit: 2014     Years since quittin.6     Smokeless tobacco: Never Used   Substance Use Topics     Alcohol use: Yes     Frequency: Monthly or less     Comment: socially     Drug use: Never        Carrie Valenzuela, EMT  5/11/2020  8:48 AM

## 2020-05-11 NOTE — PATIENT INSTRUCTIONS
Websites with free information:    [employer] patient website: www.voicesforpfd.org    Total Control Program: www.totalcontrolprogram.com    Supplements to prevent UTI to consider  -probiotics  -cranberry   Ellura: www.myellura.com   Theracran HP by Omayra [facility] [address]  -d-mannose  -Vitamin C    It was a pleasure meeting with you today.  Thank you for allowing me and my team the privilege of caring for you today.  YOU are the reason we are here, and I truly hope we provided you with the excellent service you deserve.  Please let us know if there is anything else we can do for you so that we can be sure you are leaving completely satisfied with your care experience.

## 2020-05-11 NOTE — TELEPHONE ENCOUNTER
Spoke with patient to confirm current medications, allergies, and pharmacy. Checked patient in and let them know Dr. Wilson will be giving them a call at their scheduled appointment time. Patient stated understanding.      Carrie Valenzuela EMT

## 2020-05-12 ENCOUNTER — TELEPHONE (OUTPATIENT)
Dept: SURGERY | Facility: CLINIC | Age: 80
End: 2020-05-12

## 2020-05-12 ENCOUNTER — TRANSFERRED RECORDS (OUTPATIENT)
Dept: HEALTH INFORMATION MANAGEMENT | Facility: CLINIC | Age: 80
End: 2020-05-12

## 2020-05-12 ENCOUNTER — TELEPHONE (OUTPATIENT)
Dept: INTERNAL MEDICINE | Facility: CLINIC | Age: 80
End: 2020-05-12

## 2020-05-12 DIAGNOSIS — Z79.01 LONG TERM (CURRENT) USE OF ANTICOAGULANTS: ICD-10-CM

## 2020-05-12 DIAGNOSIS — N32.81 URGENCY-FREQUENCY SYNDROME: ICD-10-CM

## 2020-05-12 DIAGNOSIS — I48.20 CHRONIC ATRIAL FIBRILLATION (H): ICD-10-CM

## 2020-05-12 DIAGNOSIS — R10.9 ABDOMINAL PAIN, UNSPECIFIED ABDOMINAL LOCATION: Primary | ICD-10-CM

## 2020-05-12 LAB — INR PPP: 2.2 (ref 0.9–1.1)

## 2020-05-12 NOTE — TELEPHONE ENCOUNTER
ANTICOAGULATION MANAGEMENT     Patient Name:  Savanna Rehman  Date:  2020    ASSESSMENT /SUBJECTIVE:    Today's INR result of 2.2 is therapeutic. Goal INR of 2.0-3.0      Warfarin dose taken: Warfarin taken as previously instructed    Diet: No new diet changes affecting INR    Medication changes/ interactions: No new medications/supplements affecting INR    Previous INR: Therapeutic     S/S of bleeding or thromboembolism: No    New injury or illness: No    Upcoming surgery, procedure or cardioversion: No    Additional findings: None      PLAN:    Spoke with Cole Anthony [facility], regarding INR result and instructed:     Warfarin Dosing Instructions: Continue your current warfarin dose 3.75 mg on  and 2.5 mg all other days    Instructed patient to follow up no later than: 2 weeks  Orders given to  Homecare nurse/facility to recheck    Education provided: None required      Cole Anthony [facility] verbalizes understanding and agrees to warfarin dosing plan.    Instructed to call the Anticoagulation Clinic for any changes, questions or concerns. (#[phone])        OBJECTIVE:  INR   Date Value Ref Range Status   2020 2.2 (A) 0.90 - 1.10 Final             Anticoagulation Summary  As of 2020    INR goal:   2.0-3.0   TTR:   81.4 % (6.4 mo)   INR used for dosin.2 (2020)   Warfarin maintenance plan:   3.75 mg (2.5 mg x 1.5) every Mon; 2.5 mg (2.5 mg x 1) all other days   Full warfarin instructions:   3.75 mg every Mon; 2.5 mg all other days   Weekly warfarin total:   18.75 mg   Plan last modified:   Azul Whitaker RN (2020)   Next INR check:   2020   Priority:   High   Target end date:   Indefinite    Indications    Permanent atrial fibrillation (Resolved) [I48.21]  Chronic atrial fibrillation [I48.20]  Long term (current) use of anticoagulants [Z79.01]             Anticoagulation Episode Summary     INR check location:       Preferred lab:   EXTERNAL LAB    Send  INR reminders to:   NIKKI CHASE    Comments:   Home care       Anticoagulation Care Providers     Provider Role Specialty Phone number    Patti Suárez MD [facility] Internal Medicine [phone]

## 2020-05-12 NOTE — TELEPHONE ENCOUNTER
Name of caller: Patient    Reason for Call:  Call back patient has questions about past surgery with LEL. She has not been seen in the past year!  She is demanding to speak with someone about that surgery. Needs to find out information to pass on the urologist and another doctor today.    Patient is aware that we are not able to give out medical advise since she has not been seen in the past year.    She is demanding and refused to listen to me...  She would like [facility] to call her back!  Surgery was 2/4/2015 with LEL  Hiatal hernia.      Patient can be reached at: [phone]

## 2020-05-12 NOTE — TELEPHONE ENCOUNTER
Patient calling  She is requesting a referral to [facility] for a colonoscopy with Cheryl Elena  Urologist thinks her UTI's may be due to something going on in her bowels    Ok to call and lm [phone]

## 2020-05-13 ENCOUNTER — TELEPHONE (OUTPATIENT)
Dept: SURGERY | Facility: CLINIC | Age: 80
End: 2020-05-13

## 2020-05-13 NOTE — TELEPHONE ENCOUNTER
Savanna notified of [facility]'s response:    I reviewed CT scan from January of this year.  This all looks good from surgical standpoint in that there is no recurrence of her hiatal hernia (I presume that is why she is calling).  I don't see anything anatomically that would be cause for her abdominal pain or food intolerances if she is still having this.  Would encourage her to follow up with GI which I feel is her next plan anyway.     Also tell her, If GI or her PCP feels she needs to follow up with us, Dr. Ansari may be willing to see her after COVID blows over, but is currently just seeing emergent cases at this time.     Savanna verbalized understanding. She is comfortable following with her GI and pcp. She will notify us of any changes. Both parties in agreement of plan.    Tammy FARRELLN, RN, OCN  Oncology Care Coordinator  [facility]  [facility]  Phone: [phone]

## 2020-05-14 RX ORDER — WARFARIN SODIUM 2.5 MG/1
TABLET ORAL
Qty: 126 TABLET | Refills: 3 | Status: SHIPPED | OUTPATIENT
Start: 2020-05-14 | End: 2021-07-28

## 2020-05-14 RX ORDER — OXYBUTYNIN CHLORIDE 5 MG/1
TABLET ORAL
Qty: 90 TABLET | Refills: 0 | Status: SHIPPED | OUTPATIENT
Start: 2020-05-14 | End: 2020-08-23

## 2020-05-14 NOTE — TELEPHONE ENCOUNTER
Left detailed voicemail message for patient stating GI referral was placed and she can go ahead and schedule colonoscopy.  BENITA Graham R.N.

## 2020-05-19 ENCOUNTER — TELEPHONE (OUTPATIENT)
Dept: INTERNAL MEDICINE | Facility: CLINIC | Age: 80
End: 2020-05-19

## 2020-05-20 ENCOUNTER — TELEPHONE (OUTPATIENT)
Dept: INTERNAL MEDICINE | Facility: CLINIC | Age: 80
End: 2020-05-20

## 2020-05-22 ENCOUNTER — TELEPHONE (OUTPATIENT)
Dept: INTERNAL MEDICINE | Facility: CLINIC | Age: 80
End: 2020-05-22

## 2020-05-22 NOTE — TELEPHONE ENCOUNTER
Pt called with a question in regards to upcoming teeth extraction next week.  Report they just want an INR 24 hours prior to extraction.  She was concerned that was not enough.  Reassured pt that bleeding risk is minimal with dental extractions.  Home care nurse will check INR on 5/27 next week.  No further questions.

## 2020-05-27 ENCOUNTER — ANTICOAGULATION THERAPY VISIT (OUTPATIENT)
Dept: INTERNAL MEDICINE | Facility: CLINIC | Age: 80
End: 2020-05-27

## 2020-05-27 DIAGNOSIS — I48.20 CHRONIC ATRIAL FIBRILLATION (H): ICD-10-CM

## 2020-05-27 DIAGNOSIS — Z79.01 LONG TERM (CURRENT) USE OF ANTICOAGULANTS: ICD-10-CM

## 2020-05-27 LAB — INR PPP: 2.3 (ref 0.9–1.1)

## 2020-05-27 NOTE — PROGRESS NOTES
ANTICOAGULATION MANAGEMENT     Patient Name:  Savanna Rehman  Date:  2020    ASSESSMENT /SUBJECTIVE:    Today's INR result of 2.3 is therapeutic. Goal INR of 2.0-3.0      Warfarin dose taken: Warfarin taken as previously instructed    Diet: No new diet changes affecting INR    Medication changes/ interactions: No interaction expected between balfalazide and warfarin    Previous INR: Therapeutic     S/S of bleeding or thromboembolism: No    New injury or illness: No    Upcoming surgery, procedure or cardioversion: No - dental procedure canceled    Additional findings: None      PLAN:    Spoke with [facility] regarding INR result and instructed:     Warfarin Dosing Instructions: Continue your current warfarin dose 3.75mg Monday & 2.5mg AOD    Instructed patient to follow up no later than: 2 weeks  Orders given to  Homecare nurse/facility to recheck    Education provided: Importance of notifying clinic of upcoming surgeries and procedures 2 weeks in advance      [facility] verbalizes understanding and agrees to warfarin dosing plan.    Instructed to call the Anticoagulation Clinic for any changes, questions or concerns. (#[phone])        OBJECTIVE:  INR   Date Value Ref Range Status   2020 2.3 (A) 0.90 - 1.10 Final         No question data found.  Anticoagulation Summary  As of 2020    INR goal:   2.0-3.0   TTR:   82.8 % (6.9 mo)   INR used for dosin.3 (2020)   Warfarin maintenance plan:   3.75 mg (2.5 mg x 1.5) every Mon; 2.5 mg (2.5 mg x 1) all other days   Full warfarin instructions:   3.75 mg every Mon; 2.5 mg all other days   Weekly warfarin total:   18.75 mg   Plan last modified:   Azul Whitaker, RN (2020)   Next INR check:      Priority:   High   Target end date:   Indefinite    Indications    Permanent atrial fibrillation (Resolved) [I48.21]  Chronic atrial fibrillation [I48.20]  Long term (current) use of anticoagulants [Z79.01]             Anticoagulation Episode  Summary     INR check location:       Preferred lab:   EXTERNAL LAB    Send INR reminders to:   NIKKI CHASE    Comments:   Home care       Anticoagulation Care Providers     Provider Role Specialty Phone number    Patti Suárez MD [facility] Internal Medicine [phone]           Sanjana Green, RN, BSN, PHN

## 2020-05-28 ENCOUNTER — TELEPHONE (OUTPATIENT)
Dept: UROLOGY | Facility: CLINIC | Age: 80
End: 2020-05-28

## 2020-05-28 NOTE — TELEPHONE ENCOUNTER
" Health Call Center    Phone Message    May a detailed message be left on voicemail: yes     Reason for Call: Symptoms or Concerns     If patient has red-flag symptoms, warm transfer to triage line    Current symptom or concern: Terrible \"not rash\" coloring on genitals and thighs that goes from the front to the back    Symptoms have been present for:  2 week(s)-Savanna isn't sure of the exact date    Has patient previously been seen for this? No    Are there any new or worsening symptoms? Yes: Savanna says this discoloration occurred after she started the gentamicin (GARAMYCIN) 40 MG/ML injection. Savanna wanted to speak with Dr. Wilson, so she made an appointment for 6/3/20. Savanna would like a call back to discuss as well.      Action Taken: Message routed to:  Other: [facility]    Travel Screening: Not Applicable                                                                      "

## 2020-05-29 NOTE — TELEPHONE ENCOUNTER
Contacted patient and discussed symptoms. Had a long discussion about symptoms and other situations going on right now (such has needing 12 teeth pulled and her favorite niece passing away due to cancer). Patient stable and will discuss this discoloration with Dr. Wilson at their upcoming appointment in a couple days.  Kely Jackson LPN

## 2020-06-01 ENCOUNTER — TELEPHONE (OUTPATIENT)
Dept: INTERNAL MEDICINE | Facility: CLINIC | Age: 80
End: 2020-06-01

## 2020-06-02 ENCOUNTER — PRE VISIT (OUTPATIENT)
Dept: UROLOGY | Facility: CLINIC | Age: 80
End: 2020-06-02

## 2020-06-02 DIAGNOSIS — Z53.9 DIAGNOSIS NOT YET DEFINED: Primary | ICD-10-CM

## 2020-06-02 PROCEDURE — G0179 MD RECERTIFICATION HHA PT: HCPCS | Performed by: INTERNAL MEDICINE

## 2020-06-02 NOTE — TELEPHONE ENCOUNTER
Reason for visit: Discuss gentamicin injection     Relevant information: Omar    Records/imaging/labs/orders: all records available    Pt called: no need for a call

## 2020-06-03 ENCOUNTER — VIRTUAL VISIT (OUTPATIENT)
Dept: UROLOGY | Facility: CLINIC | Age: 80
End: 2020-06-03
Payer: COMMERCIAL

## 2020-06-03 ENCOUNTER — TELEPHONE (OUTPATIENT)
Dept: INTERNAL MEDICINE | Facility: CLINIC | Age: 80
End: 2020-06-03

## 2020-06-03 DIAGNOSIS — N39.41 URGENCY INCONTINENCE: ICD-10-CM

## 2020-06-03 DIAGNOSIS — N39.0 RECURRENT UTI: ICD-10-CM

## 2020-06-03 DIAGNOSIS — I48.20 CHRONIC ATRIAL FIBRILLATION (H): ICD-10-CM

## 2020-06-03 DIAGNOSIS — N32.81 URGENCY-FREQUENCY SYNDROME: ICD-10-CM

## 2020-06-03 DIAGNOSIS — E66.01 MORBID OBESITY (H): Primary | ICD-10-CM

## 2020-06-03 RX ORDER — BALSALAZIDE DISODIUM 750 MG/1
2250 CAPSULE ORAL 3 TIMES DAILY
COMMUNITY
End: 2022-11-07

## 2020-06-03 NOTE — TELEPHONE ENCOUNTER
Patient is calling because she had a virtual visit with her urologist today and she asked Savanna to contact her PCP to have covid testing and the antibody testing as well. She needs to have these before surgery can be set up. She thinks she may have had covid in November or December.

## 2020-06-03 NOTE — LETTER
"6/3/2020       RE: Savanna Rehman  [address]  [address]  [facility] [facility] [place] [address]     Dear Colleague,    Thank you for referring your patient, Savanna Rehman, to the [facility] UROLOGY AND INST FOR PROSTATE AND UROLOGIC CANCERS at [facility]. Please see a copy of my visit note below.    Rosanna 3, 2020    Savanna Rehman is a 79 year old female who is being evaluated via a billable telephone visit.      The patient has been notified of following:     \"This telephone visit will be conducted via a call between you and your physician/provider. We have found that certain health care needs can be provided without the need for a physical exam.  This service lets us provide the care you need with a short phone conversation.  If a prescription is necessary we can send it directly to your pharmacy.  If lab work is needed we can place an order for that and you can then stop by our lab to have the test done at a later time.    Telephone visits are billed at different rates depending on your insurance coverage. During this emergency period, for some insurers they may be billed the same as an in-person visit.  Please reach out to your insurance provider with any questions.    If during the course of the call the physician/provider feels a telephone visit is not appropriate, you will not be charged for this service.\"    Patient has given verbal consent for Telephone visit?  Yes    What phone number would you like to be contacted at? [phone]    How would you like to obtain your AVS? Mail a copy    Patient has elected a telephone call for today's follow up.  Called patient today and verified her name and date of birth prior to discussion.    Today's telephone visit is to discuss her bladder symptoms.    She saw GI and thinks there is inflammation that is helping.  GI does not want to do the colonoscopy because of patient has a \"L shaped\" colon making the colonoscopy very " difficult.  They started some new medication -balsalazide which really helped her abdominal pain but then noted a discoloration of her skin in her groin.  Did not see her PCP as recommended on Friday but patient states that the discoloration is better    She is continuing to have her dental work done, doing it in the office with sedation because having it done in the OR was too expensive    Patient denies any changes to her past medical, surgical or social history.  Patient denies any changes to her medications or allergies    A/P:  Savanna Rehman is a 79 year old F with Omar and GI symptoms, afib on warfarin, urinary urgency frequency and urgency urinary incontinence    Needs cystoscopy with possible biopsy in the OR in the near future, may want to wait until her dental work is done especially since her abdominal pain is better    Discussed that she would need H&P, urine test and COVID testing prior    She is also again asked to see her PCP about the skin discoloration    13 minutes were spent in patient care today    Deisy Wilson MD MPH   of Urology    CC  Patient Care Team:  Patti Suárez MD as PCP - General (Internal Medicine)

## 2020-06-03 NOTE — PROGRESS NOTES
"Rosanna 3, 2020    Savanna Rehman is a 79 year old female who is being evaluated via a billable telephone visit.      The patient has been notified of following:     \"This telephone visit will be conducted via a call between you and your physician/provider. We have found that certain health care needs can be provided without the need for a physical exam.  This service lets us provide the care you need with a short phone conversation.  If a prescription is necessary we can send it directly to your pharmacy.  If lab work is needed we can place an order for that and you can then stop by our lab to have the test done at a later time.    Telephone visits are billed at different rates depending on your insurance coverage. During this emergency period, for some insurers they may be billed the same as an in-person visit.  Please reach out to your insurance provider with any questions.    If during the course of the call the physician/provider feels a telephone visit is not appropriate, you will not be charged for this service.\"    Patient has given verbal consent for Telephone visit?  Yes    What phone number would you like to be contacted at? [phone]    How would you like to obtain your AVS? Mail a copy    Patient has elected a telephone call for today's follow up.  Called patient today and verified her name and date of birth prior to discussion.    Today's telephone visit is to discuss her bladder symptoms.    She saw GI and thinks there is inflammation that is helping.  GI does not want to do the colonoscopy because of patient has a \"L shaped\" colon making the colonoscopy very difficult.  They started some new medication -balsalazide which really helped her abdominal pain but then noted a discoloration of her skin in her groin.  Did not see her PCP as recommended on Friday but patient states that the discoloration is better    She is continuing to have her dental work done, doing it in the office with sedation because " having it done in the OR was too expensive    Patient denies any changes to her past medical, surgical or social history.  Patient denies any changes to her medications or allergies    A/P:  Savanna Rehman is a 79 year old F with Omar and GI symptoms, afib on warfarin, urinary urgency frequency and urgency urinary incontinence    Needs cystoscopy with possible biopsy in the OR in the near future, may want to wait until her dental work is done especially since her abdominal pain is better    Discussed that she would need H&P, urine test and COVID testing prior    She is also again asked to see her PCP about the skin discoloration    13 minutes were spent in patient care today    Deisy Wilson MD MPH   of Urology    CC  Patient Care Team:  Patti Suárez MD as PCP - General (Internal Medicine)  Patti Suárez MD as Assigned PCP  SELF, REFERRED

## 2020-06-03 NOTE — PATIENT INSTRUCTIONS
Websites with free information:    American Urogynec[employer] Society patient website: www.voicesforpfd.org    Total Control Program: www.totalcontrolprogram.com    Follow up with your primary care provider if the skin changes don't change    It was a pleasure meeting with you today.  Thank you for allowing me and my team the privilege of caring for you today.  YOU are the reason we are here, and I truly hope we provided you with the excellent service you deserve.  Please let us know if there is anything else we can do for you so that we can be sure you are leaving completely satisfied with your care experience.

## 2020-06-04 ENCOUNTER — ANTICOAGULATION THERAPY VISIT (OUTPATIENT)
Dept: INTERNAL MEDICINE | Facility: CLINIC | Age: 80
End: 2020-06-04

## 2020-06-04 ENCOUNTER — NURSE TRIAGE (OUTPATIENT)
Dept: NURSING | Facility: CLINIC | Age: 80
End: 2020-06-04

## 2020-06-04 DIAGNOSIS — Z79.01 LONG TERM (CURRENT) USE OF ANTICOAGULANTS: ICD-10-CM

## 2020-06-04 DIAGNOSIS — Z11.59 SCREENING FOR VIRAL DISEASE: Primary | ICD-10-CM

## 2020-06-04 DIAGNOSIS — I48.20 CHRONIC ATRIAL FIBRILLATION (H): ICD-10-CM

## 2020-06-04 LAB — INR PPP: 2.9 (ref 0.9–1.1)

## 2020-06-04 NOTE — TELEPHONE ENCOUNTER
"Patient is calling requesting COVID serologic antibody testing.  NOTE: Serologic testing is a blood test for 'antibodies' which are made at 10-14 days after you have had symptoms of COVID or were exposed and had an asymptomatic infection.  This does NOT test you for 'active' infection or tell you if you are contagious.    Are you a healthcare worker?  No  Do you currently have a cough, fever, body aches, shortness of breath, or difficulty breathing?  No  Did you previously have cough, fever, body aches, shortness of breath, or difficulty breathing that have now resolved? Has had previous covid symptoms.   Symptoms began 120+ days ago.  Symptoms started > 14 days ago. Lab order placed per SARS-CoV-2 Serology test Standing Order using indication \"Previously symptomatic >14d since onset, currently asymptomatic\" and diagnosis code \"Screening for viral disease\" (Z11.59)   Patient had sx in November 20149 . Sx were cough, fever, SOB,body aches, sore throat      The patient was informed: \"Testing is limited each day and it may take time for testing to be available to everyone who has called. You will receive a call within 48-72 hours to schedule the serology testing. Please confirm the best number to reach you is [phone]. If you have any questions about scheduling, call [phone].\"     "

## 2020-06-04 NOTE — PROGRESS NOTES
ANTICOAGULATION MANAGEMENT     Patient Name:  Savanna Rehman  Date:  2020    ASSESSMENT /SUBJECTIVE:    Today's INR result of 2.9 is therapeutic. Goal INR of 2.0-3.0      Warfarin dose taken: Warfarin taken as previously instructed    Diet: No new diet changes affecting INR    Medication changes/ interactions: No new medications/supplements affecting INR    Previous INR: Therapeutic     S/S of bleeding or thromboembolism: No    New injury or illness: Yes: vomiting and diarrhea 6/3/20 only    Upcoming surgery, procedure or cardioversion: No    Additional findings: None      PLAN:    Spoke with JENNA Anthony RN regarding INR result and instructed:     Warfarin Dosing Instructions: Continue your current warfarin dose 3.75 mg M; 2.5 mg ROW    Instructed patient to follow up no later than: 1 week  Orders given to  Homecare nurse/facility to recheck    Education provided: None required      Raj verbalizes understanding and agrees to warfarin dosing plan.    Instructed to call the Anticoagulation Clinic for any changes, questions or concerns. (#[phone])        OBJECTIVE:  INR   Date Value Ref Range Status   2020 2.9 (A) 0.90 - 1.10 Final         No question data found.  Anticoagulation Summary  As of 2020    INR goal:   2.0-3.0   TTR:   83.4 % (7.2 mo)   INR used for dosin.9 (2020)   Warfarin maintenance plan:   3.75 mg (2.5 mg x 1.5) every Mon; 2.5 mg (2.5 mg x 1) all other days   Full warfarin instructions:   3.75 mg every Mon; 2.5 mg all other days   Weekly warfarin total:   18.75 mg   No change documented:   Rukhsana Flores RN   Plan last modified:   Azul Whitaker RN (2020)   Next INR check:   6/10/2020   Priority:   Maintenance   Target end date:   Indefinite    Indications    Permanent atrial fibrillation (Resolved) [I48.21]  Chronic atrial fibrillation [I48.20]  Long term (current) use of anticoagulants [Z79.01]             Anticoagulation Episode Summary     INR check  location:       Preferred lab:   EXTERNAL LAB    Send INR reminders to:   NIKKI CHASE    Comments:   Home care       Anticoagulation Care Providers     Provider Role Specialty Phone number    Patti Suárez MD [facility] Internal Medicine [phone]

## 2020-06-05 ENCOUNTER — ANTICOAGULATION THERAPY VISIT (OUTPATIENT)
Dept: INTERNAL MEDICINE | Facility: CLINIC | Age: 80
End: 2020-06-05

## 2020-06-05 DIAGNOSIS — Z79.01 LONG TERM (CURRENT) USE OF ANTICOAGULANTS: ICD-10-CM

## 2020-06-05 DIAGNOSIS — I48.20 CHRONIC ATRIAL FIBRILLATION (H): ICD-10-CM

## 2020-06-05 DIAGNOSIS — Z11.59 SCREENING FOR VIRAL DISEASE: ICD-10-CM

## 2020-06-05 PROCEDURE — 36415 COLL VENOUS BLD VENIPUNCTURE: CPT | Performed by: EMERGENCY MEDICINE

## 2020-06-05 PROCEDURE — 99000 SPECIMEN HANDLING OFFICE-LAB: CPT | Performed by: EMERGENCY MEDICINE

## 2020-06-05 PROCEDURE — 86769 SARS-COV-2 COVID-19 ANTIBODY: CPT | Mod: 90 | Performed by: EMERGENCY MEDICINE

## 2020-06-05 NOTE — PROGRESS NOTES
Call from the patient who reports that she went to the [facility] today for the extraction of 2 teeth.  The provider refused to do the procedure as her INR was > than 2.5.  The patient has a return appointment scheduled for 6/12/20 and is calling for dosing instructions in the interim.  HC RN will be back out to recheck on the 11th.    Huddled with Jayleen Oconnor [facility], and she recommends holding dose on 6/10/20 and rechecking INR on 6/11/20.  Prn INR < 2.5 on 6/11, dose at 1.25 mg.  Prn INR > 2.5 on 6/11, hold Thursday dose as well.    Per the patient, she has been having a shot of Scotch on nights that she awakens and can't go back to sleep.  Discussed effect of some foods and etoh on INR results.  She will try to cut back on Scotch in the coming week and > her Vit K intake with extra spinach.      Patient voiced understanding and agreed to plan of care. F/u on 6/11/20. Rukhsana Sandhu RN June 5, 2020 12:41 PM

## 2020-06-06 ENCOUNTER — NURSE TRIAGE (OUTPATIENT)
Dept: NURSING | Facility: CLINIC | Age: 80
End: 2020-06-06

## 2020-06-06 NOTE — TELEPHONE ENCOUNTER
"Pt calling in asking for result of Covid test >>    Looking in Chart I see the above is still \" in process\"    The above information given to pt     Protocol and care advice reviewed  Caller states understanding of the recommended disposition  Advised to call back if further questions or concerns    Yaw Suarez , RN / [facility]    Reason for Disposition    Caller requesting lab results    Additional Information    Lab result questions    Protocols used: PCP CALL - NO TRIAGE-A-AH, INFORMATION ONLY CALL-A-AH      "

## 2020-06-07 ENCOUNTER — NURSE TRIAGE (OUTPATIENT)
Dept: NURSING | Facility: CLINIC | Age: 80
End: 2020-06-07

## 2020-06-07 NOTE — TELEPHONE ENCOUNTER
Savanna called asking for her results for Covid-19 by PCR.  It is still in process.  Savanna isn't happy about the way things are being handled at her senior living home or medically.  She started out seemingly very disgusted with the operation of Covid-19 at her clinic, [facility].  I took the opportunity to fully agree with her and carefully reminded her that this is new for all of us.  By the end of our conversation she was laughing and seeing the humor in some of what's going on.   Savanna may call back later with the same request.    COVID 19 Nurse Triage Plan/Patient Instructions    Please be aware that novel coronavirus (COVID-19) may be circulating in the community. If you develop symptoms such as fever, cough, or SOB or if you have concerns about the presence of another infection including coronavirus (COVID-19), please contact your health care provider or visit [email or web address].     Disposition/Instructions    Patient to stay at home and follow home care protocol based instructions.     Thank you for taking steps to prevent the spread of this virus.  o Limit your contact with others.  o Wear a simple mask to cover your cough.  o Wash your hands well and often.    Resources    [facility]: About COVID-19: [email or web address]    CDC: What to Do If You're Sick: www.cdc.gov/coronavirus/2019-ncov/about/steps-when-sick.html    CDC: Ending Home Isolation: www.cdc.gov/coronavirus/2019-ncov/hcp/disposition-in-home-patients.html     CDC: Caring for Someone: www.cdc.gov/coronavirus/2019-ncov/if-you-are-sick/care-for-someone.html     [facility]: Interim Guidance for Hospital Discharge to Home: www.health.state.[place]/diseases/coronavirus/hcp/hospdischarge.pdf    [facility] clinical trials (COVID-19 research studies): clinicalaffairs.[employer].[facility]/umn-clinical-trials     Below are the COVID-19 hotlines at the [place] Department of Health ([facility]). Interpreters are available.   o For [place]  questions: Call 894-203-9529 or 1-194.649.3421 (7 a.m. to 7 p.m.)  o For questions about schools and childcare: Call 739-579-8237 or 1-736.740.6714 (7 a.m. to 7 p.m.)     Additional Information    Negative: RN needs further essential information from caller in order to complete triage    Negative: Requesting regular office appointment    Negative: [1] Caller requesting NON-URGENT health information AND [2] PCP's office is the best resource    Negative: Health Information question, no triage required and triager able to answer question    Negative: General information question, no triage required and triager able to answer question    Negative: Question about upcoming scheduled test, no triage required and triager able to answer question    Negative: [1] Caller is not with the adult (patient) AND [2] probable NON-URGENT symptoms    [1] Follow-up call to recent contact AND [2] information only call, no triage required    Protocols used: INFORMATION ONLY CALL-A-    Ana SMART RN [facility]

## 2020-06-08 NOTE — TELEPHONE ENCOUNTER
"According to urology note she needs preop exam.  We do Covid testing at the preop exam         \" Discussed that she would need H&P, urine test and COVID testing prior\"  "

## 2020-06-09 LAB
COVID-19 SPIKE RBD ABY TITER: NORMAL
COVID-19 SPIKE RBD ABY: NEGATIVE

## 2020-06-10 NOTE — TELEPHONE ENCOUNTER
Pt advised by TC that she will need pre op exam but patient states surgery is not yet scheduled,.  .,Ayanna Graham R.N.

## 2020-06-11 ENCOUNTER — ANTICOAGULATION THERAPY VISIT (OUTPATIENT)
Dept: INTERNAL MEDICINE | Facility: CLINIC | Age: 80
End: 2020-06-11

## 2020-06-11 DIAGNOSIS — Z79.01 LONG TERM (CURRENT) USE OF ANTICOAGULANTS: ICD-10-CM

## 2020-06-11 DIAGNOSIS — I48.20 CHRONIC ATRIAL FIBRILLATION (H): ICD-10-CM

## 2020-06-11 LAB — INR PPP: 2 (ref 0.9–1.1)

## 2020-06-11 NOTE — PROGRESS NOTES
ANTICOAGULATION MANAGEMENT     Patient Name:  Savanna Rehman  Date:  2020    ASSESSMENT /SUBJECTIVE:    Today's INR result of 2.0 is therapeutic. Goal INR of 2.0-3.0      Warfarin dose taken: Warfarin taken as previously instructed    Diet: No new diet changes affecting INR    Medication changes/ interactions: No new medications/supplements affecting INR    Previous INR: Therapeutic     S/S of bleeding or thromboembolism: No    New injury or illness: No    Upcoming surgery, procedure or cardioversion: yes, 2 teeth extraction on 20    Additional findings: None      PLAN:    Spoke with Fannie, home care,  regarding INR result and instructed:     Warfarin Dosing Instructions: 1.25 mg tonight then continue your current warfarin dose of 3.75 mg on  and 2.5 mg all other days if approved by dentist who performs tooth extraction on 20  (Per Pharmacist notes on 20 patient to take 1.25 mg tonight if INR <2.5 on  due to dental procedure on ).  Instructed patient to follow up no later than: 1 week  Orders given to  Homecare nurse/facility to recheck, home care to recheck on 20    Education provided: None required      Fannie verbalizes understanding and agrees to warfarin dosing plan.    Instructed to call the Anticoagulation Clinic for any changes, questions or concerns. (#[phone])        OBJECTIVE:  INR   Date Value Ref Range Status   2020 2.0 (A) 0.90 - 1.10 Final         No question data found.  Anticoagulation Summary  As of 2020    INR goal:   2.0-3.0   TTR:   83.9 % (7.4 mo)   INR used for dosin.0 (2020)   Warfarin maintenance plan:   3.75 mg (2.5 mg x 1.5) every Mon; 2.5 mg (2.5 mg x 1) all other days   Full warfarin instructions:   : 1.25 mg; Otherwise 3.75 mg every Mon; 2.5 mg all other days   Weekly warfarin total:   18.75 mg   Plan last modified:   Azul Whitaker RN (2020)   Next INR check:      Priority:   Maintenance   Target end  date:   Indefinite    Indications    Permanent atrial fibrillation (Resolved) [I48.21]  Chronic atrial fibrillation [I48.20]  Long term (current) use of anticoagulants [Z79.01]             Anticoagulation Episode Summary     INR check location:       Preferred lab:   EXTERNAL LAB    Send INR reminders to:   NIKKI CHASE    Comments:   Home care       Anticoagulation Care Providers     Provider Role Specialty Phone number    Patti Suárez MD [facility] Internal Medicine [phone]

## 2020-06-17 ENCOUNTER — ANTICOAGULATION THERAPY VISIT (OUTPATIENT)
Dept: INTERNAL MEDICINE | Facility: CLINIC | Age: 80
End: 2020-06-17

## 2020-06-17 DIAGNOSIS — Z79.01 LONG TERM (CURRENT) USE OF ANTICOAGULANTS: ICD-10-CM

## 2020-06-17 DIAGNOSIS — I48.20 CHRONIC ATRIAL FIBRILLATION (H): ICD-10-CM

## 2020-06-17 LAB — INR PPP: 2.1 (ref 0.9–1.1)

## 2020-06-17 NOTE — PROGRESS NOTES
ANTICOAGULATION MANAGEMENT     Patient Name:  Savanna Rehman  Date:  2020    ASSESSMENT /SUBJECTIVE:    Today's INR result of 2.1 is therapeutic. Goal INR of 2.0-3.0      Warfarin dose taken: Less warfarin taken than instructed which may be affecting INR    Diet: No new diet changes affecting INR    Medication changes/ interactions: No new medications/supplements affecting INR    Previous INR: Therapeutic     S/S of bleeding or thromboembolism: No    New injury or illness: No    Upcoming surgery, procedure or cardioversion: No    Additional findings: None      PLAN:    Spoke with Nurse Raj regarding INR result and instructed:     Warfarin Dosing Instructions: Continue your current warfarin dose 3.75 mg Mon, 2.5 mg all other days    Instructed patient to follow up no later than: 1 week  Orders given to  Homecare nurse/facility to recheck    Education provided: Monitoring for bleeding signs and symptoms and Monitoring for clotting signs and symptoms      Nurse Raj verbalizes understanding and agrees to warfarin dosing plan.    Instructed to call the Anticoagulation Clinic for any changes, questions or concerns. (#[phone])        OBJECTIVE:  INR   Date Value Ref Range Status   2020 2.1 (A) 0.90 - 1.10 Final         No question data found.  Anticoagulation Summary  As of 2020    INR goal:   2.0-3.0   TTR:   84.3 % (7.6 mo)   INR used for dosin.1 (2020)   Warfarin maintenance plan:   3.75 mg (2.5 mg x 1.5) every Mon; 2.5 mg (2.5 mg x 1) all other days   Full warfarin instructions:   3.75 mg every Mon; 2.5 mg all other days   Weekly warfarin total:   18.75 mg   No change documented:   Azul Whitaker RN   Plan last modified:   Azul Whitaker RN (2020)   Next INR check:   2020   Priority:   Maintenance   Target end date:   Indefinite    Indications    Permanent atrial fibrillation (H) (Resolved) [I48.21]  Chronic atrial fibrillation (H) [I48.20]  Long term (current)  use of anticoagulants [Z79.01]             Anticoagulation Episode Summary     INR check location:       Preferred lab:   EXTERNAL LAB    Send INR reminders to:   NIKKI CHASE    Comments:   Home care       Anticoagulation Care Providers     Provider Role Specialty Phone number    Patti Suárez MD [facility] Internal Medicine [phone]

## 2020-06-25 ENCOUNTER — ANTICOAGULATION THERAPY VISIT (OUTPATIENT)
Dept: INTERNAL MEDICINE | Facility: CLINIC | Age: 80
End: 2020-06-25

## 2020-06-25 DIAGNOSIS — Z11.59 ENCOUNTER FOR SCREENING FOR OTHER VIRAL DISEASES: Primary | ICD-10-CM

## 2020-06-25 DIAGNOSIS — Z79.01 LONG TERM (CURRENT) USE OF ANTICOAGULANTS: ICD-10-CM

## 2020-06-25 DIAGNOSIS — I48.20 CHRONIC ATRIAL FIBRILLATION (H): ICD-10-CM

## 2020-06-25 LAB — INR PPP: 3.2 (ref 0.9–1.1)

## 2020-06-25 NOTE — PROGRESS NOTES
ANTICOAGULATION MANAGEMENT     Patient Name:  Savanna Rehman  Date:  6/25/2020    ASSESSMENT /SUBJECTIVE:    Today's INR result of 3.2 is supratherapeutic. Goal INR of 2.0-3.0      Warfarin dose taken: Warfarin taken as previously instructed    Diet: No new diet changes affecting INR    Medication changes/ interactions: Interaction between Tylenol and warfarin may be affecting INR    Previous INR: Therapeutic     S/S of bleeding or thromboembolism: No    New injury or illness: No    Upcoming surgery, procedure or cardioversion: Tooth extraction on Wed.     Additional findings: Tylenol due to pain from teeth extraction, patient to have INR rechecked tues 06/30/20 pior to procedure, patient to hold dose due to needing to be <2.5 and supra today      PLAN:    Spoke with jesus alberto Greco, regarding INR result and instructed:     Warfarin Dosing Instructions: Hold tonight then Continue your current warfarin dose 3.75 mg on mondays and 2.5 mg all other days    Instructed patient to follow up no later than: 1 week  Orders given to  Homecare nurse/facility to recheck    Education provided: Potential interaction between warfarin and tylenol and Monitoring for bleeding signs and symptoms      Cole Greco, verbalizes understanding and agrees to warfarin dosing plan.    Instructed to call the Anticoagulation Clinic for any changes, questions or concerns. (#[phone])        OBJECTIVE:  INR   Date Value Ref Range Status   06/25/2020 3.2 (A) 0.90 - 1.10 Final         No question data found.  Anticoagulation Summary  As of 6/25/2020    INR goal:   2.0-3.0   TTR:   84.3 % (7.9 mo)   INR used for dosing:   3.2! (6/25/2020)   Warfarin maintenance plan:   3.75 mg (2.5 mg x 1.5) every Mon; 2.5 mg (2.5 mg x 1) all other days   Full warfarin instructions:   3.75 mg every Mon; 2.5 mg all other days   Weekly warfarin total:   18.75 mg   Plan last modified:   Azul Whitaker RN (2/20/2020)   Next INR check:   7/2/2020    Priority:   Maintenance   Target end date:   Indefinite    Indications    Permanent atrial fibrillation (H) (Resolved) [I48.21]  Chronic atrial fibrillation (H) [I48.20]  Long term (current) use of anticoagulants [Z79.01]             Anticoagulation Episode Summary     INR check location:       Preferred lab:   EXTERNAL LAB    Send INR reminders to:   NIKKI CHASE    Comments:   Home care       Anticoagulation Care Providers     Provider Role Specialty Phone number    Patti Suárez MD [facility] Internal Medicine [phone]

## 2020-06-29 ENCOUNTER — TELEPHONE (OUTPATIENT)
Dept: INTERNAL MEDICINE | Facility: CLINIC | Age: 80
End: 2020-06-29

## 2020-06-29 NOTE — TELEPHONE ENCOUNTER
Patient is wondering what she can take for tooth pain. She had been taking tylenol 1000 mg every 4 hours. Per her she states that made her INR go from 1.0 to 3.2 so they were unable to do her tooth extraction and rescheduled it to 7/1/2020.     Advised to take 1 tylenol every 6 hours as needed or to attempt to do an cold/hot compress to area. Could use washcloth, put in freezer to make cold to use as a compress.     Deborah Zepeda RN - [facility]

## 2020-06-30 ENCOUNTER — ANTICOAGULATION THERAPY VISIT (OUTPATIENT)
Dept: INTERNAL MEDICINE | Facility: CLINIC | Age: 80
End: 2020-06-30

## 2020-06-30 ENCOUNTER — PATIENT OUTREACH (OUTPATIENT)
Dept: UROLOGY | Facility: CLINIC | Age: 80
End: 2020-06-30

## 2020-06-30 ENCOUNTER — TELEPHONE (OUTPATIENT)
Dept: UROLOGY | Facility: CLINIC | Age: 80
End: 2020-06-30

## 2020-06-30 DIAGNOSIS — E11.42 DIABETIC POLYNEUROPATHY ASSOCIATED WITH TYPE 2 DIABETES MELLITUS (H): Primary | ICD-10-CM

## 2020-06-30 DIAGNOSIS — N39.0 RECURRENT UTI: Primary | ICD-10-CM

## 2020-06-30 DIAGNOSIS — Z79.01 LONG TERM (CURRENT) USE OF ANTICOAGULANTS: ICD-10-CM

## 2020-06-30 DIAGNOSIS — I48.20 CHRONIC ATRIAL FIBRILLATION (H): ICD-10-CM

## 2020-06-30 LAB — INR PPP: 1.8 (ref 0.9–1.1)

## 2020-06-30 RX ORDER — GLIPIZIDE 2.5 MG/1
TABLET, EXTENDED RELEASE ORAL
Qty: 90 TABLET | Refills: 0 | Status: SHIPPED | OUTPATIENT
Start: 2020-06-30 | End: 2020-09-24

## 2020-06-30 NOTE — TELEPHONE ENCOUNTER
Patient is scheduled for surgery with Dr. Wilson      Spoke or left message with: Adelaida to reach out to patient    Date of Surgery: 7/13/20    Location: [facility] OR    Informed patient they will need an adult  yes    Pre-op with surgeon (if applicable): n/a    H&P: Scheduled with pcp    Additional imaging/appointments: n/a    Surgery packet: to be mailed by 7/1/20     Additional comments: n/a

## 2020-06-30 NOTE — TELEPHONE ENCOUNTER
Patient notified of the her surgery date and needs a little time to decide since she is having teeth pulled tomorrow and next week.  Will wait until she calls me back once she has decided.    Adelaida Liu RN   Care Coordinator Urology  Patient has decided to commit to having her procedure 7/13/2020 with Dr. Wilson for cystoscopy and possible bladder biopsies. Her plan is to see her primary 7/6/20 in [place] and I will see if she can be seen the same day in the [place] office at 10 am for catheterized urine specimen.   Covid-19 testing on 7/10/20  Patient is requesting one valium to for her specimen collection appointment.  Will refer to Dr. Steve Liu RN   Care Coordinator Urology

## 2020-06-30 NOTE — PROGRESS NOTES
ANTICOAGULATION MANAGEMENT     Patient Name:  Savanna Rehman  Date:  2020    ASSESSMENT /SUBJECTIVE:    Today's INR result of 1.8 is subtherapeutic. Goal INR of 2.0-3.0      Warfarin dose taken: Warfarin recently held as instructed which may be affecting INR    Diet: No new diet changes affecting INR    Medication changes/ interactions: No new medications/supplements affecting INR    Previous INR: Supratherapeutic     S/S of bleeding or thromboembolism: No    New injury or illness: No    Upcoming surgery, procedure or cardioversion: Yes: 10 teeth being extracted tomorrow    Additional findings: Some of her teeth being pulled tomorrow are broken      PLAN:    Spoke with Raj/Nurse regarding INR result and instructed:     Warfarin Dosing Instructions: Hold today for procedure, resume dosing tomorrow    Instructed patient to follow up no later than: 2 days  Orders given to  Homecare nurse/facility to recheck    Education provided: Monitoring for bleeding signs and symptoms and Monitoring for clotting signs and symptoms      Nurse Raj verbalizes understanding and agrees to warfarin dosing plan.    Instructed to call the Anticoagulation Clinic for any changes, questions or concerns. (#[phone])        OBJECTIVE:  INR   Date Value Ref Range Status   2020 1.8 (A) 0.90 - 1.10 Final         No question data found.  Anticoagulation Summary  As of 2020    INR goal:   2.0-3.0   TTR:   84.0 % (8 mo)   INR used for dosin.8! (2020)   Warfarin maintenance plan:   3.75 mg (2.5 mg x 1.5) every Mon; 2.5 mg (2.5 mg x 1) all other days   Full warfarin instructions:   : Hold; Otherwise 3.75 mg every Mon; 2.5 mg all other days   Weekly warfarin total:   18.75 mg   Plan last modified:   Azul Whitaker RN (2020)   Next INR check:   2020   Priority:   Maintenance   Target end date:   Indefinite    Indications    Permanent atrial fibrillation (H) (Resolved) [I48.21]  Chronic atrial  fibrillation (H) [I48.20]  Long term (current) use of anticoagulants [Z79.01]             Anticoagulation Episode Summary     INR check location:       Preferred lab:   EXTERNAL LAB    Send INR reminders to:   NIKKI CHASE    Comments:   Home care       Anticoagulation Care Providers     Provider Role Specialty Phone number    Patti Suárez MD [facility] Internal Medicine [phone]

## 2020-07-01 ENCOUNTER — TELEPHONE (OUTPATIENT)
Dept: UROLOGY | Facility: CLINIC | Age: 80
End: 2020-07-01

## 2020-07-01 NOTE — TELEPHONE ENCOUNTER
[facility] Call Center    Phone Message    May a detailed message be left on voicemail: yes     Reason for Call: Other: Savanna calling because she is scheduled to have surgery with Dr. Wilson on 7/13/20 and needs a catheterized urine specimen. Estrella spoke with Adelaida on 6/30/20 and she said that Adelaida had offered a 10 a.m. nursing appointment in [place]. Writer was unable to pull up any available nursing appointments for [place] either of those days, and Savanna said it must be either the 7/6/20 or 7/7/20 that this is done. Savanna also stated that this needed to be a nursing appointment, so a lab appointment in [place] would not work. Savanna is concerned about making sure this gets done for her to have the surgery with Dr. Wilson. Savanna would like to speak with Adelaida to see what needs to be done in order to do so. Savanna is having her teeth pulled today (7/1/20), so it would be best to call her later in the afternoon. Please give Savanna a call back at your earliest convenience.     Action Taken: Message routed to:  [facility] ([facility]  Uro    Travel Screening: Not Applicable

## 2020-07-01 NOTE — TELEPHONE ENCOUNTER
Patient is scheduled for pre-op and will schedule an appointment at the lab for 7/6/2020    Adelaida Liu RN   Care Coordinator Urology

## 2020-07-02 ENCOUNTER — TELEPHONE (OUTPATIENT)
Dept: INTERNAL MEDICINE | Facility: CLINIC | Age: 80
End: 2020-07-02

## 2020-07-02 ENCOUNTER — ANTICOAGULATION THERAPY VISIT (OUTPATIENT)
Dept: INTERNAL MEDICINE | Facility: CLINIC | Age: 80
End: 2020-07-02

## 2020-07-02 DIAGNOSIS — Z79.01 LONG TERM (CURRENT) USE OF ANTICOAGULANTS: ICD-10-CM

## 2020-07-02 DIAGNOSIS — I48.20 CHRONIC ATRIAL FIBRILLATION (H): ICD-10-CM

## 2020-07-02 LAB — INR PPP: 1.8 (ref 0.9–1.1)

## 2020-07-02 NOTE — PROGRESS NOTES
ANTICOAGULATION MANAGEMENT     Patient Name:  Savanna Rehman  Date:  2020    ASSESSMENT /SUBJECTIVE:    Today's INR result of 1.8 is subtherapeutic. Goal INR of 2.0-3.0      Warfarin dose taken: Warfarin recently held as instructed which may be affecting INR    Diet: No new diet changes affecting INR    Medication changes/ interactions: No new medications/supplements affecting INR    Previous INR: Subtherapeutic     S/S of bleeding or thromboembolism: No    New injury or illness:  no    Upcoming surgery, procedure or cardioversion: Yes: Cystoscopy with possible biopsies planned for 20; no notes on hold or bridging from urology.  Has pre-op scheduled for 7/10/20. Will route to [facility] for review.    Additional findings: None      PLAN:    Spoke with JENNA Anthony RN regarding INR result and instructed:     Warfarin Dosing Instructions: Continue your current warfarin dose 3.75 mg on M; 2.5 mg ROW    Instructed patient to follow up no later than: 1 week  Orders given to  Homecare nurse/facility to recheck    Education provided: None required      Will make plan for possible hold and/or bridge and share with Raj on 20. Raj verbalizes understanding and agrees to warfarin dosing plan.    Instructed to call the Anticoagulation Clinic for any changes, questions or concerns. (#[phone])        OBJECTIVE:  INR   Date Value Ref Range Status   2020 1.8 (A) 0.90 - 1.10 Final         INR assessment SUB    Recheck INR In: 1 WEEK    INR Location Homecare INR      Anticoagulation Summary  As of 2020    INR goal:   2.0-3.0   TTR:   83.3 % (8.1 mo)   INR used for dosin.8! (2020)   Warfarin maintenance plan:   3.75 mg (2.5 mg x 1.5) every Mon; 2.5 mg (2.5 mg x 1) all other days   Full warfarin instructions:   3.75 mg every Mon; 2.5 mg all other days   Weekly warfarin total:   18.75 mg   No change documented:   Rukhsana Flores RN   Plan last modified:   Azul Whitaker RN (2020)    Next INR check:   7/9/2020   Priority:   Maintenance   Target end date:   Indefinite    Indications    Permanent atrial fibrillation (H) (Resolved) [I48.21]  Chronic atrial fibrillation (H) [I48.20]  Long term (current) use of anticoagulants [Z79.01]             Anticoagulation Episode Summary     INR check location:       Preferred lab:   EXTERNAL LAB    Send INR reminders to:   NIKKI CHASE    Comments:   Home care       Anticoagulation Care Providers     Provider Role Specialty Phone number    Patti Suárez MD [facility] Internal Medicine [phone]

## 2020-07-02 NOTE — PROGRESS NOTES
Bridging instructions done by Jayleen Oconnor [facility].  See TE 7/2/20.      Called and spoke with both the patient and JENNA Anthony RN and relayed instructions. Will check INR on 7/10 as prn only.      Lovenox, if recommended, will be ordered at pre-op visit.  Recheck INR on 7/17/20; home care notified. Rukhsana Sandhu RN July 2, 2020 3:42 PM

## 2020-07-02 NOTE — TELEPHONE ENCOUNTER
[facility] ANTICOAGULATION  MANAGEMENT    Assessment     Warfarin interruption plan for cystoscopy on 2020.    A. Fib      Risk stratification for thromboembolism: moderate (2017 ACC periprocedure pathway for NVAF expert Cconsensus)      [id number] = 5    NVAF: 2017 ACC periprocedure pathway for NVAF advises NO bridge for moderate risk stratification ([id number] score 5-6 or hx of stroke, TIA or systemic embolism with increased risk of bleeding    Plan       Pre-Procedure:    Hold warfarin until after procedure startin2020        Post-Procedure:    Resume home warfarin dose if okay with provider doing procedure on night of procedure, 2020 PM: 2.5mg    Recheck INR 4-5 days after resuming warfarin   ?   Jayleen Oconnor RPH    Subjective/Objective:      Savanna Rehman, a 79 year old female    Reason for Anticoagulation: A. Fib    Goal INR Range: 2.0-3.0    Patient bridged in past: Yes: in 2016    Pertinent History: guidelines have changed since last bridge    Wt Readings from Last 3 Encounters:   03/10/20 120.7 kg (266 lb)   20 122.5 kg (270 lb)   20 120.7 kg (266 lb)        Ideal body weight: 63.9 kg (140 lb 14 oz)  Adjusted ideal body weight: 86.6 kg (190 lb 14.8 oz)     Lab Results   Component Value Date    INR 1.8 (A) 2020    INR 1.8 (A) 2020    INR 3.2 (A) 2020     Lab Results   Component Value Date    HGB 15.2 2020    HCT 45.8 2020     2020     Lab Results   Component Value Date    CR 0.83 2020    CR 0.79 2020    CR 0.95 2019     CrCl cannot be calculated (Patient's most recent lab result is older than the maximum 10 days allowed.).

## 2020-07-02 NOTE — PROGRESS NOTES
Anticoagulation Management    Unable to reach Raj, home care, today.    Today's INR result of 1.8 is subtherapeutic (goal INR of 2.0-3.0).  Result received from: Home Care    Follow up required to TCB    Nurse is driving, will call back shortly      Anticoagulation clinic to follow up    Iris Kemp RN

## 2020-07-06 DIAGNOSIS — N39.0 RECURRENT UTI: ICD-10-CM

## 2020-07-06 LAB
ALBUMIN UR-MCNC: NEGATIVE MG/DL
APPEARANCE UR: CLEAR
BACTERIA #/AREA URNS HPF: ABNORMAL /HPF
BILIRUB UR QL STRIP: NEGATIVE
COLOR UR AUTO: YELLOW
GLUCOSE UR STRIP-MCNC: NEGATIVE MG/DL
HGB UR QL STRIP: NEGATIVE
KETONES UR STRIP-MCNC: NEGATIVE MG/DL
LEUKOCYTE ESTERASE UR QL STRIP: NEGATIVE
NITRATE UR QL: POSITIVE
NON-SQ EPI CELLS #/AREA URNS LPF: ABNORMAL /LPF
PH UR STRIP: 5.5 PH (ref 5–7)
RBC #/AREA URNS AUTO: ABNORMAL /HPF
SOURCE: ABNORMAL
SP GR UR STRIP: 1.01 (ref 1–1.03)
UROBILINOGEN UR STRIP-ACNC: 0.2 EU/DL (ref 0.2–1)
WBC #/AREA URNS AUTO: ABNORMAL /HPF

## 2020-07-06 PROCEDURE — 87088 URINE BACTERIA CULTURE: CPT | Performed by: UROLOGY

## 2020-07-06 PROCEDURE — 81001 URINALYSIS AUTO W/SCOPE: CPT | Performed by: UROLOGY

## 2020-07-06 PROCEDURE — 87186 SC STD MICRODIL/AGAR DIL: CPT | Performed by: UROLOGY

## 2020-07-06 PROCEDURE — 87086 URINE CULTURE/COLONY COUNT: CPT | Performed by: UROLOGY

## 2020-07-07 ENCOUNTER — TELEPHONE (OUTPATIENT)
Dept: UROLOGY | Facility: CLINIC | Age: 80
End: 2020-07-07

## 2020-07-07 DIAGNOSIS — N39.0 URINARY TRACT INFECTION: Primary | ICD-10-CM

## 2020-07-07 RX ORDER — SULFAMETHOXAZOLE/TRIMETHOPRIM 800-160 MG
1 TABLET ORAL 2 TIMES DAILY
Qty: 10 TABLET | Refills: 0 | Status: ON HOLD | OUTPATIENT
Start: 2020-07-08 | End: 2020-07-13

## 2020-07-07 NOTE — TELEPHONE ENCOUNTER
Patient calling to make sure she got antibiotics early for her surgery on Monday. She has numerous allergies. Bactrim is not her list. Patient started on Bactrim until the morning of her procedure 7/13/20.  She has STOPPED her warfarin a few days ago because she had some teeth pulled (12). She will restart next week after her procedure.    Adelaida Liu RN   Care Coordinator Urology

## 2020-07-08 LAB
BACTERIA SPEC CULT: ABNORMAL
SPECIMEN SOURCE: ABNORMAL

## 2020-07-09 ENCOUNTER — ANTICOAGULATION THERAPY VISIT (OUTPATIENT)
Dept: INTERNAL MEDICINE | Facility: CLINIC | Age: 80
End: 2020-07-09

## 2020-07-09 DIAGNOSIS — I48.20 CHRONIC ATRIAL FIBRILLATION (H): ICD-10-CM

## 2020-07-09 DIAGNOSIS — Z79.01 LONG TERM (CURRENT) USE OF ANTICOAGULANTS: ICD-10-CM

## 2020-07-09 LAB — INR PPP: 2 (ref 0.9–1.1)

## 2020-07-09 NOTE — PROGRESS NOTES
ANTICOAGULATION MANAGEMENT     Patient Name:  Savanna Rehman  Date:  7/9/2020    ASSESSMENT /SUBJECTIVE:    Today's INR result of 2.0 is therapeutic. Goal INR of 2.0-3.0      Warfarin dose taken: Missed dose(s) may be affecting INR. Patient elected on her own to start holding warfarin Tue 7/7    Diet: No new diet changes affecting INR    Medication changes/ interactions: Interaction between bactrim and warfarin may be affecting INR. Started this yesterday, 5 day course.     Previous INR: Subtherapeutic     S/S of bleeding or thromboembolism: No    New injury or illness: UTI    Upcoming surgery, procedure or cardioversion: Yes: cystoscopy with possible biopsy on Mon 7/13. Hold orders per encounter 7/2    Additional findings: Noted that PCP and urology have not yet signed off on hold/no bridge orders per encounter 7/2. Did update Savanna that as of now we are not expecting her to have to bridge. Her preop is scheduled tomorrow and she expects PCP will address this tomorrow if they want her to bridge. Will call back with an update if there are questions about this.       PLAN:    Spoke with Evelin HORTON and Savanna regarding INR result and instructed:     Warfarin Dosing Instructions: Continue to hold warfarin. If okay with provider doing procedure plan to resume warfarin on Mon 7/13 with 2.5mg (per orders 7/2) and then resume previous dose of    3.75 mg every Mon; 2.5 mg all other days         Instructed patient to follow up no later than: Fri 7/17    Orders given to  Homecare nurse/facility to recheck    Education provided: Target INR goal and significance of current INR result, Potential interaction between warfarin and bactrim and Importance of notifying clinic for changes in medications      Eladio verbalized understanding and agreed to warfarin dosing plan.    Instructed to call the Anticoagulation Clinic for any changes, questions or concerns. (#[phone])        OBJECTIVE:  INR   Date Value Ref  Range Status   2020 2.0 (A) 0.90 - 1.10 Final         No question data found.  Anticoagulation Summary  As of 2020    INR goal:   2.0-3.0   TTR:   81.0 % (8.3 mo)   INR used for dosin.0 (2020)   Warfarin maintenance plan:   3.75 mg (2.5 mg x 1.5) every Mon; 2.5 mg (2.5 mg x 1) all other days   Full warfarin instructions:   : Hold; 7/10: Hold; : Hold; : Hold; : 2.5 mg; Otherwise 3.75 mg every Mon; 2.5 mg all other days   Weekly warfarin total:   18.75 mg   Plan last modified:   Azul Whitaker RN (2020)   Next INR check:   2020   Priority:   Maintenance   Target end date:   Indefinite    Indications    Permanent atrial fibrillation (H) (Resolved) [I48.21]  Chronic atrial fibrillation (H) [I48.20]  Long term (current) use of anticoagulants [Z79.01]             Anticoagulation Episode Summary     INR check location:       Preferred lab:   EXTERNAL LAB    Send INR reminders to:   NIKKI CHASE    Comments:   Home care       Anticoagulation Care Providers     Provider Role Specialty Phone number    Patti Suárez MD [facility] [phone]

## 2020-07-10 ENCOUNTER — OFFICE VISIT (OUTPATIENT)
Dept: INTERNAL MEDICINE | Facility: CLINIC | Age: 80
End: 2020-07-10
Payer: COMMERCIAL

## 2020-07-10 VITALS
WEIGHT: 265.1 LBS | TEMPERATURE: 98.4 F | OXYGEN SATURATION: 96 % | HEART RATE: 65 BPM | SYSTOLIC BLOOD PRESSURE: 120 MMHG | DIASTOLIC BLOOD PRESSURE: 78 MMHG | RESPIRATION RATE: 20 BRPM | BODY MASS INDEX: 40.18 KG/M2 | HEIGHT: 68 IN

## 2020-07-10 DIAGNOSIS — N39.0 FREQUENT UTI: ICD-10-CM

## 2020-07-10 DIAGNOSIS — Z01.818 PREOPERATIVE EXAMINATION: Primary | ICD-10-CM

## 2020-07-10 DIAGNOSIS — N39.0 URINARY TRACT INFECTION: Primary | ICD-10-CM

## 2020-07-10 PROCEDURE — 99214 OFFICE O/P EST MOD 30 MIN: CPT | Performed by: INTERNAL MEDICINE

## 2020-07-10 PROCEDURE — 93000 ELECTROCARDIOGRAM COMPLETE: CPT | Performed by: INTERNAL MEDICINE

## 2020-07-10 RX ORDER — CEPHALEXIN 500 MG/1
500 CAPSULE ORAL 2 TIMES DAILY
Qty: 14 CAPSULE | Refills: 0 | Status: SHIPPED | OUTPATIENT
Start: 2020-07-10 | End: 2020-08-06

## 2020-07-10 ASSESSMENT — MIFFLIN-ST. JEOR: SCORE: 1735.99

## 2020-07-10 NOTE — PROGRESS NOTES
[place] [address]  [facility] [address]  [phone]  Dept: [phone]    PRE-OP EVALUATION:  Today's date: 7/10/2020    Savanna Rehman (: 1940) presents for pre-operative evaluation assessment as requested by Dr. Wilson.  She requires evaluation and anesthesia risk assessment prior to undergoing surgery/procedure for treatment of recurrent bladder infection.    Primary Physician: Patti Suárez  Type of Anesthesia Anticipated: Choice    Patient has a Health Care Directive or Living Will:  NO,     Preop Questions 7/10/2020   Who is doing your surgery? dr wilson   What are you having done? Cystoscopy with possible bladder biopsy   Date of Surgery/Procedure: 2020 @ 9 am   Facility or Hospital where procedure/surgery will be performed: [facility]   1.  Do you have a history of Heart attack, stroke, stent, coronary bypass surgery, or other heart surgery? No   2.  Do you ever have any pain or discomfort in your chest? No   3.  Do you have a history of  Heart Failure? No   4.   Are you troubled by shortness of breath when:  walking on a level surface, or up a slight hill, or at night? No   5.  Do you currently have a cold, bronchitis or other respiratory infection? No   6.  Do you have a cough, shortness of breath, or wheezing? No   7.  Do you sometimes get pains in the calves of your legs when you walk? No   8. Do you or anyone in your family have previous history of blood clots? No   9.  Do you or does anyone in your family have a serious bleeding problem such as prolonged bleeding following surgeries or cuts? No   10. Have you ever had problems with anemia or been told to take iron pills? YES - in 40s   11. Have you had any abnormal blood loss such as black, tarry or bloody stools, or abnormal vaginal bleeding? No   12. Have you ever had a blood transfusion? YES - in 40s with anemia   13. Have you or any of your relatives ever had problems with  anesthesia? No   14. Do you have sleep apnea, excessive snoring or daytime drowsiness? YES - not using CPAP   15. Do you have any prosthetic heart valves? No   16. Do you have prosthetic joints? YES - left knee replaced   17. Is there any chance that you may be pregnant? No         HPI:     HPI related to upcoming procedure: she has had chronic recurrent UTI and urinary frequency so will have cystoscopy with possible biopsy      A-FIB - Patient has a longstanding history of chronic A-fib currently on no medication for rate control. Current treatment regimen includes Warfarin for stroke prevention and denies significant symptoms of lightheadedness, palpitations or dyspnea.     DIABETES - Patient has a longstanding history of DiabetesType Type II . Patient is being treated with oral agents and denies significant side effects. Control has been good. Complicating factors include but are not limited to: hyperlipidemia.       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     Priority: Medium     Urgency incontinence 08/13/2019     Priority: Medium     Candidiasis of skin 08/13/2019     Priority: Medium     Morbid obesity (H) 08/29/2018     Priority: Medium     CRISTIANA (obstructive sleep apnea) - CPAP 08/29/2018     Priority: Medium     Angioedema 03/09/2015     Priority: Medium      Past Medical History:   Diagnosis Date     Anemia     Iron Deficiency anemia     Atrial  fibrillation (H)      CAD (coronary artery disease)     non-obstructive     Chronic pain     neck, low back, legs     Congestive heart failure (H)      Degenerative disk disease      Fibromyalgia      Gastro-oesophageal reflux disease      Gout      Hiatal hernia      Mumps      Neuropathy      Palpitations      Pernicious anemia      Sleep apnea     uses CPAP.     Urinary incontinence      Vitamin D deficiency      Past Surgical History:   Procedure Laterality Date     APPENDECTOMY       CHOLECYSTECTOMY       COLONOSCOPY  3/15/2011     CORONARY ANGIOGRAPHY ADULT ORDER       HEART CATH LEFT HEART CATH  12/30/16    medication management     HYSTERECTOMY TOTAL ABDOMINAL       Knee replacement NOS Left      LAPAROSCOPIC NISSEN FUNDOPLICATION N/A 2/4/2015    Procedure: LAPAROSCOPIC NISSEN FUNDOPLICATION;  Surgeon: Armando Ansari MD;  Location: [facility] OR     TONSILLECTOMY       TRANSPOSITION ULNAR NERVE (ELBOW)       Current Outpatient Medications   Medication Sig Dispense Refill     B Complex-C (SUPER B COMPLEX PO) Take 1 tablet by mouth At Bedtime       balsalazide (COLAZAL) 750 MG capsule Take 750 mg by mouth 3 times daily       Biotin 5000 MCG TABS Take 5,000 mcg by mouth At Bedtime       cholecalciferol (VITAMIN D3) 5000 units (125 mcg) capsule Take 5,000 Units by mouth At Bedtime       gentamicin (GARAMYCIN) 40 MG/ML injection Gentamicin 160 mg IM day one 4 mL  Gentamicin   80 mg IM day two 2 mL  Gentamicin   80 mg IM day three 2 mL 8 mL 0     glipiZIDE (GLUCOTROL XL) 2.5 MG 24 hr tablet TAKE 1 TABLET DAILY 90 tablet 0     oxybutynin (DITROPAN) 5 MG tablet TAKE 1 TABLET TWICE A DAY 90 tablet 0     ranitidine (ZANTAC) 150 MG tablet Take 150 mg by mouth At Bedtime       sulfamethoxazole-trimethoprim (BACTRIM DS) 800-160 MG tablet Take 1 tablet by mouth 2 times daily for 5 days 10 tablet 0     thin (NO BRAND SPECIFIED) lancets Use with lanceting device to check glucose once a day. To accompany: Blood Glucose Monitor  Brands: per insurance. 100 each 3     warfarin ANTICOAGULANT (COUMADIN) 2.5 MG tablet TAKE AS INSTRUCTED BY YOUR PRESCRIBER 126 tablet 3     OTC products: None, except as noted above    Allergies   Allergen Reactions     Augmented Betamethasone Diprop [Betamethasone] Other (See Comments)     Severe yeast infection     Petroleum Jelly [Petrolatum] Anaphylaxis     Rash and swelling     Shellfish-Derived Products Anaphylaxis     Tongue swelling     Aspirin Swelling     tiongue swelling     Bacitracin      Rash swelling     Coumadin [Warfarin] Swelling     Leg swelling     Darvon [Propoxyphene] Swelling     Throat closes     Dilaudid [Hydromorphone]      Levaquin [Levofloxacin] Swelling     Tongue swelling     Neosporin [Neomycin-Polymyx-Gramicid] Swelling     rash     Nitrofurantoin      SOB, GI upset,     Oxycodone      Severe itching     Percodan [Oxycodone-Aspirin]      Severe itching     Tramadol      Vicodin [Hydrocodone-Acetaminophen]      Severe itching       Xarelto [Rivaroxaban]      Adhesive Tape Rash     Band aids      Codeine Rash      Latex Allergy: NO    Social History     Tobacco Use     Smoking status: Former Smoker     Packs/day: 0.50     Years: 50.00     Pack years: 25.00     Types: Cigarettes     Last attempt to quit: 2014     Years since quittin.8     Smokeless tobacco: Never Used   Substance Use Topics     Alcohol use: Yes     Frequency: Monthly or less     Comment: socially     History   Drug Use Unknown       REVIEW OF SYSTEMS:   GENERAL: negative for, fever, chills, weight loss, weight gain  EYES: negative  ENT: negative  RESPIRATORY: No shortness of breath and No cough  CARDIOVASCULAR: negative for, palpitations, tachycardia and chest pain  GI: positive for gets nausea episodes, stable, negative for, vomiting, abdominal pain, melena and hematochezia  : negative for and hematuria  MUSCULOSKELETAL: back pain  NEUROLOGIC: positive for headaches every morning from not using CPAP, negative  "for, seizures, local weakness, numbness or tingling of hands and numbness or tingling of feet  SKIN: negative  ENDOCRINE: sugars controlled        EXAM:       Patient is alert, oriented, cooperative in no acute distress.  /78   Pulse 65   Temp 98.4  F (36.9  C) (Oral)   Resp 20   Ht 1.727 m (5' 8\")   Wt 120.2 kg (265 lb 1.6 oz)   LMP  (LMP Unknown)   SpO2 96%   BMI 40.31 kg/m      HEENT: PERRL, EOMI, TM's are normal.  NECK: No lymphadenopathy or thyromegaly. Carotid pulses full without bruits.  LUNGS: clear  CV: normal S1, S2 without murmur, S3 or S4 present. Pulses are 2/2 throughout. No JVD.  ABDOMEN: Bowel sounds present, nontender without hepatosplenomegaly. Liver is normal size to percussion.  EXTREMITIES: no edema present, unremarkable joints  NEUROLOGIC: Cranial nerves II-VI intact, strength 5/5 gait limping      DIAGNOSTICS:   EKG: atrial fibrillation, rate 68, normal axis, normal intervals, no acute ST/T changes c/w ischemia, no LVH by voltage criteria, unchanged from previous tracings    Recent Labs   Lab Test 07/09/20 07/02/20 03/04/20  1001  01/27/20  1025  11/16/19  0841   HGB  --   --   --   --   --  15.2  --  14.9   PLT  --   --   --   --   --  226  --  227   INR 2.0* 1.8*   < >  --    < > 2.23*   < >  --    NA  --   --   --  137  --  137  --  134   POTASSIUM  --   --   --  4.0  --  4.3  --  4.3   CR  --   --   --  0.83  --  0.79  --  0.95   A1C  --   --   --  6.6*  --   --   --  6.4*    < > = values in this interval not displayed.        IMPRESSION:   Reason for surgery/procedure: recurrent UTI  Diagnosis/reason for consult: preop    The proposed surgical procedure is considered LOW risk.    REVISED CARDIAC RISK INDEX  The patient has the following serious cardiovascular risks for perioperative complications such as (MI, PE, VFib and 3  AV Block):  No serious cardiac risks  INTERPRETATION: 0 risks: Class I (very low risk - 0.4% complication rate)    The patient has the following " additional risks for perioperative complications:  Morbid obesity      ICD-10-CM    1. Preoperative examination  Z01.818 EKG 12-lead complete w/read - Clinics   2. Frequent UTI  N39.0        RECOMMENDATIONS:         --Patient is to take all scheduled medications on the day of surgery EXCEPT for modifications listed below.    Diabetes Medication Use  -----Hold usual oral and non-insulin diabetic meds (e.g. Metformin, Actos, Glipizide) while NPO.       Anticoagulant or Antiplatelet Medication Use  WARFARIN: hold 5 days prior per surgeon        APPROVAL GIVEN to proceed with proposed procedure, without further diagnostic evaluation       Signed Electronically by: Dorothy Soto MD    Copy of this evaluation report is provided to requesting physician.    [facility] Preop Guidelines    Revised Cardiac Risk Index

## 2020-07-10 NOTE — NURSING NOTE
"/78   Pulse 65   Temp 98.4  F (36.9  C) (Oral)   Resp 20   Ht 1.727 m (5' 8\")   Wt 120.2 kg (265 lb 1.6 oz)   LMP  (LMP Unknown)   SpO2 96%   BMI 40.31 kg/m      "

## 2020-07-11 DIAGNOSIS — Z11.59 ENCOUNTER FOR SCREENING FOR OTHER VIRAL DISEASES: ICD-10-CM

## 2020-07-11 PROCEDURE — U0003 INFECTIOUS AGENT DETECTION BY NUCLEIC ACID (DNA OR RNA); SEVERE ACUTE RESPIRATORY SYNDROME CORONAVIRUS 2 (SARS-COV-2) (CORONAVIRUS DISEASE [COVID-19]), AMPLIFIED PROBE TECHNIQUE, MAKING USE OF HIGH THROUGHPUT TECHNOLOGIES AS DESCRIBED BY CMS-2020-01-R: HCPCS | Performed by: UROLOGY

## 2020-07-11 PROCEDURE — 99207 ZZC NO BILLABLE SERVICE THIS VISIT: CPT

## 2020-07-12 LAB
SARS-COV-2 RNA SPEC QL NAA+PROBE: NOT DETECTED
SPECIMEN SOURCE: NORMAL

## 2020-07-13 ENCOUNTER — ANESTHESIA (OUTPATIENT)
Dept: SURGERY | Facility: CLINIC | Age: 80
End: 2020-07-13
Payer: COMMERCIAL

## 2020-07-13 ENCOUNTER — DOCUMENTATION ONLY (OUTPATIENT)
Dept: INTERNAL MEDICINE | Facility: CLINIC | Age: 80
End: 2020-07-13

## 2020-07-13 ENCOUNTER — HOSPITAL ENCOUNTER (OUTPATIENT)
Facility: CLINIC | Age: 80
Discharge: HOME | End: 2020-07-13
Attending: UROLOGY | Admitting: UROLOGY
Payer: COMMERCIAL

## 2020-07-13 ENCOUNTER — ANESTHESIA EVENT (OUTPATIENT)
Dept: SURGERY | Facility: CLINIC | Age: 80
End: 2020-07-13
Payer: COMMERCIAL

## 2020-07-13 VITALS
HEART RATE: 56 BPM | HEIGHT: 68 IN | SYSTOLIC BLOOD PRESSURE: 130 MMHG | OXYGEN SATURATION: 96 % | RESPIRATION RATE: 20 BRPM | DIASTOLIC BLOOD PRESSURE: 82 MMHG | WEIGHT: 266.1 LBS | BODY MASS INDEX: 40.33 KG/M2 | TEMPERATURE: 97.6 F

## 2020-07-13 DIAGNOSIS — N39.41 URGENCY INCONTINENCE: ICD-10-CM

## 2020-07-13 DIAGNOSIS — B37.2 CANDIDIASIS OF SKIN: ICD-10-CM

## 2020-07-13 DIAGNOSIS — N39.0 RECURRENT UTI: ICD-10-CM

## 2020-07-13 DIAGNOSIS — Z79.01 LONG TERM (CURRENT) USE OF ANTICOAGULANTS: ICD-10-CM

## 2020-07-13 DIAGNOSIS — N32.81 URGENCY-FREQUENCY SYNDROME: Primary | ICD-10-CM

## 2020-07-13 DIAGNOSIS — I48.20 CHRONIC ATRIAL FIBRILLATION (H): ICD-10-CM

## 2020-07-13 LAB
GLUCOSE BLDC GLUCOMTR-MCNC: 132 MG/DL (ref 70–99)
INR PPP: 1.21 (ref 0.86–1.14)

## 2020-07-13 PROCEDURE — 36000051 ZZH SURGERY LEVEL 2 1ST 30 MIN - UMMC: Performed by: UROLOGY

## 2020-07-13 PROCEDURE — 25000128 H RX IP 250 OP 636: Performed by: NURSE ANESTHETIST, CERTIFIED REGISTERED

## 2020-07-13 PROCEDURE — 36000053 ZZH SURGERY LEVEL 2 EA 15 ADDTL MIN - UMMC: Performed by: UROLOGY

## 2020-07-13 PROCEDURE — 88305 TISSUE EXAM BY PATHOLOGIST: CPT | Mod: 26 | Performed by: UROLOGY

## 2020-07-13 PROCEDURE — 25000125 ZZHC RX 250: Performed by: UROLOGY

## 2020-07-13 PROCEDURE — 82962 GLUCOSE BLOOD TEST: CPT | Mod: GZ

## 2020-07-13 PROCEDURE — 25000128 H RX IP 250 OP 636: Performed by: UROLOGY

## 2020-07-13 PROCEDURE — 36415 COLL VENOUS BLD VENIPUNCTURE: CPT | Performed by: ANESTHESIOLOGY

## 2020-07-13 PROCEDURE — 88305 TISSUE EXAM BY PATHOLOGIST: CPT | Performed by: UROLOGY

## 2020-07-13 PROCEDURE — 71000027 ZZH RECOVERY PHASE 2 EACH 15 MINS: Performed by: UROLOGY

## 2020-07-13 PROCEDURE — 27210794 ZZH OR GENERAL SUPPLY STERILE: Performed by: UROLOGY

## 2020-07-13 PROCEDURE — 37000008 ZZH ANESTHESIA TECHNICAL FEE, 1ST 30 MIN: Performed by: UROLOGY

## 2020-07-13 PROCEDURE — 25800030 ZZH RX IP 258 OP 636: Performed by: NURSE ANESTHETIST, CERTIFIED REGISTERED

## 2020-07-13 PROCEDURE — 85610 PROTHROMBIN TIME: CPT | Performed by: ANESTHESIOLOGY

## 2020-07-13 PROCEDURE — 40000170 ZZH STATISTIC PRE-PROCEDURE ASSESSMENT II: Performed by: UROLOGY

## 2020-07-13 PROCEDURE — 37000009 ZZH ANESTHESIA TECHNICAL FEE, EACH ADDTL 15 MIN: Performed by: UROLOGY

## 2020-07-13 RX ORDER — CEFAZOLIN SODIUM 1 G/3ML
1 INJECTION, POWDER, FOR SOLUTION INTRAMUSCULAR; INTRAVENOUS SEE ADMIN INSTRUCTIONS
Status: DISCONTINUED | OUTPATIENT
Start: 2020-07-13 | End: 2020-07-13 | Stop reason: HOSPADM

## 2020-07-13 RX ORDER — NYSTATIN 100000 [USP'U]/G
POWDER TOPICAL
Qty: 30 G | Refills: 1 | Status: SHIPPED | OUTPATIENT
Start: 2020-07-13 | End: 2021-12-17

## 2020-07-13 RX ORDER — ONDANSETRON 4 MG/1
4 TABLET, ORALLY DISINTEGRATING ORAL EVERY 30 MIN PRN
Status: DISCONTINUED | OUTPATIENT
Start: 2020-07-13 | End: 2020-07-13 | Stop reason: HOSPADM

## 2020-07-13 RX ORDER — LIDOCAINE HYDROCHLORIDE 20 MG/ML
JELLY TOPICAL PRN
Status: DISCONTINUED | OUTPATIENT
Start: 2020-07-13 | End: 2020-07-13 | Stop reason: HOSPADM

## 2020-07-13 RX ORDER — ONDANSETRON 2 MG/ML
4 INJECTION INTRAMUSCULAR; INTRAVENOUS EVERY 30 MIN PRN
Status: DISCONTINUED | OUTPATIENT
Start: 2020-07-13 | End: 2020-07-13 | Stop reason: HOSPADM

## 2020-07-13 RX ORDER — ESTRADIOL 0.1 MG/G
CREAM VAGINAL
Qty: 42.5 G | Refills: 3 | Status: SHIPPED | OUTPATIENT
Start: 2020-07-13 | End: 2020-08-14

## 2020-07-13 RX ORDER — PROPOFOL 10 MG/ML
INJECTION, EMULSION INTRAVENOUS CONTINUOUS PRN
Status: DISCONTINUED | OUTPATIENT
Start: 2020-07-13 | End: 2020-07-13

## 2020-07-13 RX ORDER — SODIUM CHLORIDE, SODIUM LACTATE, POTASSIUM CHLORIDE, CALCIUM CHLORIDE 600; 310; 30; 20 MG/100ML; MG/100ML; MG/100ML; MG/100ML
INJECTION, SOLUTION INTRAVENOUS CONTINUOUS
Status: DISCONTINUED | OUTPATIENT
Start: 2020-07-13 | End: 2020-07-13 | Stop reason: HOSPADM

## 2020-07-13 RX ORDER — FENTANYL CITRATE 50 UG/ML
INJECTION, SOLUTION INTRAMUSCULAR; INTRAVENOUS PRN
Status: DISCONTINUED | OUTPATIENT
Start: 2020-07-13 | End: 2020-07-13

## 2020-07-13 RX ORDER — FENTANYL CITRATE 50 UG/ML
25 INJECTION, SOLUTION INTRAMUSCULAR; INTRAVENOUS EVERY 5 MIN PRN
Status: DISCONTINUED | OUTPATIENT
Start: 2020-07-13 | End: 2020-07-13 | Stop reason: HOSPADM

## 2020-07-13 RX ORDER — CEFAZOLIN SODIUM IN 0.9 % NACL 3 G/100 ML
3 INTRAVENOUS SOLUTION, PIGGYBACK (ML) INTRAVENOUS
Status: COMPLETED | OUTPATIENT
Start: 2020-07-13 | End: 2020-07-13

## 2020-07-13 RX ORDER — NALOXONE HYDROCHLORIDE 0.4 MG/ML
.1-.4 INJECTION, SOLUTION INTRAMUSCULAR; INTRAVENOUS; SUBCUTANEOUS
Status: DISCONTINUED | OUTPATIENT
Start: 2020-07-13 | End: 2020-07-13 | Stop reason: HOSPADM

## 2020-07-13 RX ORDER — SODIUM CHLORIDE, SODIUM LACTATE, POTASSIUM CHLORIDE, CALCIUM CHLORIDE 600; 310; 30; 20 MG/100ML; MG/100ML; MG/100ML; MG/100ML
INJECTION, SOLUTION INTRAVENOUS CONTINUOUS PRN
Status: DISCONTINUED | OUTPATIENT
Start: 2020-07-13 | End: 2020-07-13

## 2020-07-13 RX ORDER — PROPOFOL 10 MG/ML
INJECTION, EMULSION INTRAVENOUS PRN
Status: DISCONTINUED | OUTPATIENT
Start: 2020-07-13 | End: 2020-07-13

## 2020-07-13 RX ADMIN — PROPOFOL 30 MG: 10 INJECTION, EMULSION INTRAVENOUS at 09:06

## 2020-07-13 RX ADMIN — Medication 3 G: at 09:03

## 2020-07-13 RX ADMIN — PROPOFOL 75 MCG/KG/MIN: 10 INJECTION, EMULSION INTRAVENOUS at 08:58

## 2020-07-13 RX ADMIN — SODIUM CHLORIDE, POTASSIUM CHLORIDE, SODIUM LACTATE AND CALCIUM CHLORIDE: 600; 310; 30; 20 INJECTION, SOLUTION INTRAVENOUS at 08:52

## 2020-07-13 RX ADMIN — FENTANYL CITRATE 25 MCG: 50 INJECTION, SOLUTION INTRAMUSCULAR; INTRAVENOUS at 08:58

## 2020-07-13 RX ADMIN — PROPOFOL 40 MG: 10 INJECTION, EMULSION INTRAVENOUS at 08:58

## 2020-07-13 ASSESSMENT — MIFFLIN-ST. JEOR: SCORE: 1740.5

## 2020-07-13 ASSESSMENT — LIFESTYLE VARIABLES: TOBACCO_USE: 1

## 2020-07-13 ASSESSMENT — ENCOUNTER SYMPTOMS: DYSRHYTHMIAS: 1

## 2020-07-13 NOTE — DISCHARGE INSTRUCTIONS
Same-Day Surgery   Adult Discharge Orders & Instructions     For 24 hours after surgery:  1. Get plenty of rest.  A responsible adult must stay with you for at least 24 hours after you leave the hospital.   2. Pain medication can slow your reflexes. Do not drive or use heavy equipment.  If you have weakness or tingling, don't drive or use heavy equipment until this feeling goes away.  3. Mixing alcohol and pain medication can cause dizziness and slow your breathing. It can even be fatal. Do not drink alcohol while taking pain medication.  4. Avoid strenuous or risky activities.  Ask for help when climbing stairs.   5. You may feel lightheaded.  If so, sit for a few minutes before standing.  Have someone help you get up.   6. If you have nausea (feel sick to your stomach), drink only clear liquids such as apple juice, ginger ale, broth or 7-Up.  Rest may also help.  Be sure to drink enough fluids.  Move to a regular diet as you feel able. Take pain medications with a small amount of solid food, such as toast or crackers, to avoid nausea.   7. A slight fever is normal. Call the doctor if your fever is over 100 F (37.7 C) (taken under the tongue) or lasts longer than 24 hours.  8. You may have a dry mouth, muscle aches, trouble sleeping or a sore throat.  These symptoms should go away after 24 hours.  9. Do not make important or legal decisions.   Pain Management:      1. Take pain medication (if prescribed) for pain as directed by your physician.        2. WARNING: If the pain medication you have been prescribed contains Tylenol  (acetaminophen), DO NOT take additional doses of Tylenol (acetaminophen).     Call your doctor for any of the followin.  Signs of infection (fever, growing tenderness at the surgery site, severe pain, a large amount of drainage or bleeding, foul-smelling drainage, redness, swelling).    2.  It has been over 8 to 10 hours since surgery and you are still not able to urinate (pee).    3.   Headache for over 24 hours.    4.  Numbness, tingling or weakness the day after surgery (if you had spinal anesthesia).  To contact a doctor, call _____________________________________ or:      [phone] and ask for the Resident On Call for:          __________________________________________ (answered 24 hours a day)      Emergency Department:  [facility] [facility]: [phone]  [place] [facility]: [phone]               Rev. 10/2014

## 2020-07-13 NOTE — BRIEF OP NOTE
[facility], [place]    Brief Operative Note    Pre-operative diagnosis: Recurrent UTI [N39.0]  Post-operative diagnosis Same as pre-operative diagnosis    Procedure: Procedure(s):  CYSTOSCOPY, WITH BLADDER BIOPSY  Surgeon: Surgeon(s) and Role:     * Deisy Wilson MD - Primary     * Sowmya Sanchez MD - Resident - Assisting  Anesthesia: Choice   Estimated blood loss: None  Drains: None  Specimens:   ID Type Source Tests Collected by Time Destination   A : hypervascular lesion Tissue Bladder SURGICAL PATHOLOGY EXAM Deisy Wilson MD 7/13/2020  9:26 AM      Findings:   Diffuse patches of cystitis cystica. One erythematous patch at right/posterior wall biopsied x 2 and fulgurated with excellent hemostasis..  Complications: None.  Implants: * No implants in log *      Plan:  Discharge home today  Continue antibiotics, will prescribe course of 3 mos PPx  Resume warfarin POD#0  Follow up 7/30 for path review

## 2020-07-13 NOTE — ANESTHESIA CARE TRANSFER NOTE
Patient: Savanna Rehman    Procedure(s):  CYSTOSCOPY, WITH BLADDER BIOPSY    Diagnosis: Recurrent UTI [N39.0]  Diagnosis Additional Information: No value filed.    Anesthesia Type:   MAC     Note:  Airway :Room Air  Patient transferred to:PACU  Comments: Arrived in Phase II, report to RN, vitals stable, patient comfortable.  Handoff Report: Identifed the Patient, Identified the Reponsible Provider, Reviewed the pertinent medical history, Discussed the surgical course, Reviewed Intra-OP anesthesia mangement and issues during anesthesia, Set expectations for post-procedure period and Allowed opportunity for questions and acknowledgement of understanding      Vitals: (Last set prior to Anesthesia Care Transfer)    CRNA VITALS  7/13/2020 0912 - 7/13/2020 1012      7/13/2020             NIBP:  116/68    NIBP Mean:  80    SpO2:  97 %                Electronically Signed By: BATSHEVA Gauthier CRNA  July 13, 2020  11:27 AM

## 2020-07-13 NOTE — LETTER
"July 23, 2020      Savanna Sanderson  [address]   [facility] [address]        Dear ,    We are writing to inform you of your test results.    {results letter list:[id number]}    Resulted Orders   Surgical pathology exam   Result Value Ref Range    Copath Report       Patient Name: SAVANNA SANDERSON  MR#: [id number]  Specimen #: [id number]  Collected: 7/13/2020  Received: 7/13/2020  Reported: 7/14/2020 18:26  Ordering Phy(s): CHRIS HERRON    For improved result formatting, select 'View Enhanced Report Format' under   Linked Documents section.    SPECIMEN(S):  Bladder biopsy , hypervascular lesion    FINAL DIAGNOSIS:  A. Bladder, hypervascular lesion, biopsy:  - Benign urothelial mucosa  - Chronic inflammation and hemorrhage within lamina propria    I have personally reviewed all specimens and/or slides, including the   listed special stains, and used them  with my medical judgement to determine or confirm the final diagnosis.    Electronically signed out by:    Vidya William M.D., [facility]    CLINICAL HISTORY:  Patient is a 79-year-old female with recurrent UTIs and urinary   urgency/frequency. Cystoscopy showed bladder  mucosa with diffuse patches consistent with cystitis cystica and a 3cm   erythematous area was biopsied.    GROSS:  The  specimen is received in formalin with proper patient identification,   labeled \"hypervascular lesion\".  The  specimen consists of 2 pieces of pink-tan soft tissue, 0.2 and 0.3 cm in   greatest dimension. The specimen is  wrapped and entirely submitted in A1. (Dictated by: DIANA Hodges   7/13/2020 11:28 AM)    MICROSCOPIC:  Microscopic examination was performed.    The technical component of this testing was completed at the [facility], with the professional component performed   at the [facility], [address],   [place], " [place] [address] ([phone])    CPT Codes:  A: 84179-LJ4    COLLECTION SITE:  Client: [facility]  Location: [facility]OR (B)    Resident  SARAH         If you have any questions or concerns, please call the clinic at the number listed above.       Sincerely,    Deisy Wilson MD    No name on file.

## 2020-07-13 NOTE — ANESTHESIA PREPROCEDURE EVALUATION
"Anesthesia Pre-Procedure Evaluation    Patient: Savanna Rehman   MRN:     [id number] Gender:   female   Age:    79 year old :      1940        Preoperative Diagnosis: Recurrent UTI [N39.0]   Procedure(s):  CYSTOSCOPY, WITH POSSIBLE BLADDER BIOPSY     LABS:  CBC:   Lab Results   Component Value Date    WBC 17.8 (H) 2020    WBC 6.7 2019    HGB 15.2 2020    HGB 14.9 2019    HCT 45.8 2020    HCT 45.4 2019     2020     2019     BMP:   Lab Results   Component Value Date     2020     2020    POTASSIUM 4.0 2020    POTASSIUM 4.3 2020    CHLORIDE 104 2020    CHLORIDE 102 2020    CO2 30 2020    CO2 28 2020    BUN 14 2020    BUN 19 2020    CR 0.83 2020    CR 0.79 2020     (H) 2020     (H) 2020     COAGS:   Lab Results   Component Value Date    INR 2.0 (A) 2020     POC:   Lab Results   Component Value Date     (H) 2020     OTHER:   Lab Results   Component Value Date    LACT 1.7 2020    A1C 6.6 (H) 2020    SAUL 9.4 2020    MAG 1.8 2017    ALBUMIN 3.6 2020    PROTTOTAL 8.5 2020    ALT 31 2020    AST 26 2020    ALKPHOS 83 2020    BILITOTAL 0.8 2020    TSH 2.45 2019        Preop Vitals    BP Readings from Last 3 Encounters:   20 (!) 168/92   07/10/20 120/78   03/10/20 138/78    Pulse Readings from Last 3 Encounters:   20 79   07/10/20 65   03/10/20 78      Resp Readings from Last 3 Encounters:   20 18   07/10/20 20   20 16    SpO2 Readings from Last 3 Encounters:   20 96%   07/10/20 96%   20 93%      Temp Readings from Last 1 Encounters:   20 36.8  C (98.2  F) (Oral)    Ht Readings from Last 1 Encounters:   20 1.727 m (5' 8\")      Wt Readings from Last 1 Encounters:   20 120.7 kg (266 lb 1.5 oz)    Estimated body mass " "index is 40.46 kg/m  as calculated from the following:    Height as of this encounter: 1.727 m (5' 8\").    Weight as of this encounter: 120.7 kg (266 lb 1.5 oz).     LDA:  Right Radial Interventional Cardiac Procedure Access (Active)   Number of days: 1291        Past Medical History:   Diagnosis Date     Anemia     Iron Deficiency anemia     Atrial fibrillation (H)      CAD (coronary artery disease)     non-obstructive     Chronic pain     neck, low back, legs     Congestive heart failure (H)      Degenerative disk disease      Fibromyalgia      Gastro-oesophageal reflux disease      Gout      Hiatal hernia      Mumps      Neuropathy      Palpitations      Pernicious anemia      Sleep apnea     uses CPAP.     Urinary incontinence      Vitamin D deficiency       Past Surgical History:   Procedure Laterality Date     APPENDECTOMY       CHOLECYSTECTOMY       COLONOSCOPY  3/15/2011     CORONARY ANGIOGRAPHY ADULT ORDER       HEART CATH LEFT HEART CATH  12/30/16    medication management     HYSTERECTOMY TOTAL ABDOMINAL       Knee replacement NOS Left      LAPAROSCOPIC NISSEN FUNDOPLICATION N/A 2/4/2015    Procedure: LAPAROSCOPIC NISSEN FUNDOPLICATION;  Surgeon: Armando Ansari MD;  Location: [facility]     TONSILLECTOMY       TRANSPOSITION ULNAR NERVE (ELBOW)        Allergies   Allergen Reactions     Augmented Betamethasone Diprop [Betamethasone] Other (See Comments)     Severe yeast infection     Petroleum Jelly [Petrolatum] Anaphylaxis     Rash and swelling     Shellfish-Derived Products Anaphylaxis     Tongue swelling     Aspirin Swelling     tiongue swelling     Bacitracin      Rash swelling     Bactrim [Sulfamethoxazole W/Trimethoprim] Dizziness     Coumadin [Warfarin] Swelling     Leg swelling     Darvon [Propoxyphene] Swelling     Throat closes     Dilaudid [Hydromorphone]      Levaquin [Levofloxacin] Swelling     Tongue swelling     Neosporin [Neomycin-Polymyx-Gramicid] Swelling     rash     Nitrofurantoin      SOB, " GI upset,     Oxycodone      Severe itching     Percodan [Oxycodone-Aspirin]      Severe itching     Tramadol      Vicodin [Hydrocodone-Acetaminophen]      Severe itching       Xarelto [Rivaroxaban]      Adhesive Tape Rash     Band aids      Codeine Rash        Anesthesia Evaluation     . Pt has had prior anesthetic. Type: General and MAC    No history of anesthetic complications          ROS/MED HX    ENT/Pulmonary:     (+)sleep apnea, tobacco use, Past use 25 packs/day  doesn't use CPAP , . .    Neurologic:       Cardiovascular:     (+) Dyslipidemia, --CAD, --. Taking blood thinners Pt has received instructions: . . . :. dysrhythmias a-fib, . Previous cardiac testing Echodate:2018results:There is borderline concentric left ventricular hypertrophy.  The visual ejection fraction is estimated at 50-55%.  The left atrium is mild to moderately dilated.  The right atrium is mildly dilated.  There is mild (1+) aortic regurgitation.  No hemodynamically significant valvular aortic stenosis.  The ascending aorta is Mildly dilated.  Inferior vena cava not well visualized for estimation of right atrial  pressure.  Technically difficult study limited views obtained due to body habitusdate: results: date: results:Cath date: 2016 results:Hemodynamics     Left ventricular end diastolic pressure equals 20 mmHg     Angiography     Left ventriculogram: Ejection fraction is 45% there is extensive area of anterolateral hypokinesis the basal segments contract normally. There is mild mitral insufficiency. LV chamber size is upper limits of  normal     Coronary angiogram     Left main: No significant narrowing     LAD: The proximal mid and distal segments are very tortuous with mild generalized atheromatous change but no significant focal narrowing. There is PIA-3 flow in the LAD and all branches.     Circumflex: Nondominant vessel. Gives off a single large terminal marginal branch before running in the AV groove. There is mild  "atheromatous change in the marginal branch with tortuosity but no significant focal narrowing. PIA-3 flow is present     Ramus intermedius branch: Moderate size vessel with no significant narrowing. PIA-3 flow.     Right coronary: Dominant vessel. Atheromatous change with no focal narrowing. PIA-3 flow.     Assessment: Although there is generalized atheromatous change in all coronary vessels, there is no focal narrowing that would warrant mechanical intervention. The differential diagnosis includes \"TakoSubo/Stress Cardiomyopathy/ apical ballooning\" syndrome.     Recommendations  1) aspirin 81 daily as long is no bleeding complications  2) may resume warfarin with INR between 2 and 2.5  3. ACE inhibitor/beta blocker for ideal blood pressure control  4. Statin therapy  5. Follow-up echocardiogram infusion to reassess left ventricular function. No indication for mechanical intervention at this time         (-) irregular heartbeat/palpitations and valvular problems/murmurs   METS/Exercise Tolerance: Comment: Pernicious anemia     Hematologic:     (+) Anemia, -      Musculoskeletal:         GI/Hepatic: Comment: Hiatal hernia s/p fundoplication 2015    (+) GERD Asymptomatic on medication,       Renal/Genitourinary:     (+) Other Renal/ Genitourinary, Recurrent UTI, incontinence       Endo:     (+) type II DM Obesity, Other Endocrine Disorder Gout, vitamin D deficiency.      Psychiatric:         Infectious Disease:  - neg infectious disease ROS       Malignancy:      - no malignancy   Other: Comment: Fibromyalgia   (+) H/O Chronic Pain,                       PHYSICAL EXAM:   Mental Status/Neuro:    Airway: Facies: Thick Neck; Macroglossia  Mallampati: III  Mouth/Opening: Full  TM distance: < 6 cm  Neck ROM: Full   Respiratory: Auscultation: CTAB     Resp. Rate: Normal     Resp. Effort: Normal      CV: Rhythm: Irregular; Afib  Rate: Age appropriate  Heart: Normal Sounds  Edema: RLE; LLE   Comments: Recent extraction of " upper teeth, some lower teeth still present      Dental: Endentulous                Assessment:   ASA SCORE: 3    H&P: History and physical reviewed and following examination; no interval change.   Smoking Status:  Non-Smoker/Unknown   NPO Status: NPO Appropriate     Plan:   Anes. Type:  MAC   Pre-Medication: None   Induction:  N/a   Airway: Native Airway   Access/Monitoring: PIV   Maintenance: N/a     Postop Plan:   Postop Pain: None  Postop Sedation/Airway: Not planned  Disposition: Outpatient     PONV Management:   Adult Risk Factors: Female, Non-Smoker   Prevention: Ondansetron     CONSENT: Direct conversation   Plan and risks discussed with: Patient   Blood Products: Consent Deferred (Minimal Blood Loss)       Comments for Plan/Consent:  MAC, sedation, GA as back up, standard ASA monitors  All pertinent results and records reviewed, risks, included but not limited to hypoventilation, hypoxemia, laryngo/bronchospasm, N/V, intraoperative awareness due to sedation only d/w patient, all questions, concerns addressed                 Marvel Burns MD

## 2020-07-13 NOTE — OP NOTE
OPERATIVE REPORT    PREOPERATIVE DIAGNOSIS: Recurrent UTI, urinary urgency frequency    POSTOPERATIVE DIAGNOSIS: Same    PROCEDURES PERFORMED:   1. Cystourethroscopy  2. Targeted bladder biopsies  3. Fulguration of biopsied areas    STAFF SURGEON: Deisy Wilson MD  RESIDENT(S):  Sowmya Sanchez MD    ANESTHESIA: MAC    ESTIMATED BLOOD LOSS: 2 mL     DRAINS: None    OPERATIVE INDICATIONS:   Savanna Rehman is a(n) 79 year old female with a history of recurrent UTI and urgency/frequency of urination. In accordance with AUA guidelines for complicated UTI, cystoscopy recommended and patient was unable to tolerate this in the office. The patient elected for biopsy and maximal fulguration, after a discussion of all risks, benefits, and alternatives.    DESCRIPTION OF PROCEDURE:   After verification of informed consent was obtained, the patient was brought to the operating room, and moved to the operating table. After adequate anesthesia was induced, the patient was repositioned in dorsal lithotomy position in supportive yellowfin stirrups with care in neural safety in positioning of all four extremities.  She was then prepped and draped in the usual sterile fashion. A formal timeout was performed to confirm the correct patient, procedure and operative site.     A 22-Faroese rigid cystoscope was inserted into a well lubricated urethra. We began by performing a white light cystourethroscopy. The urethra was unremarkable. The ureteral orifices were in their orthotopic positions bilaterally and had brisk efflux of clear urine. There were no intravesical stones or diverticuli and the bladder was mildly trabeculated.     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 There was a ~3 cm region on the posterior/right bladder wall that was erythematous with some telangiectasia. We used a cold-cup flexible biopsy forceps to take two biopsies from this region and passed these off for pathology. A  Bugbee was used to fulgurate the biopsied sites and any bleeding areas. The bladder was re-examined under low pressure and hemostasis was confirmed. At this point, the bladder was drained and the cystoscope withdrawn.    The procedure was then concluded. The patient was transferred to the postanesthesia care unit in stable condition and tolerated the procedure well.    POSTOP PLAN:  1. Follow up 7/30 for pathology review  2. Patient is to finish her course of keflex for pre-op urine culture, then take 3 mos keflex as prophylaxis  3. Resume warfarin tonight    Addendum:    I, Deisy Wilson, was present for the entire case.  I agree with the note as above with changes made as needed.  I spoke to the patient's  over the phone at the end of the case    Deisy Wilson MD MPH   of Urology

## 2020-07-13 NOTE — PROGRESS NOTES
ANTICOAGULATION  MANAGEMENT: Discharge Review    Savanna Rehman chart reviewed for anticoagulation continuity of care    Outpatient surgery/procedure on 07/13/20 for Cystoscopy with bladder biopsy.    Discharge disposition: Home    Results:    Recent labs: (last 7 days)     07/09/20 07/13/20  0749   INR 2.0* 1.21*     Anticoagulation inpatient management:     home regimen continued    Anticoagulation discharge instructions:     Warfarin dosing: home regimen continued   Bridging: No   INR goal change: No      Medication changes affecting anticoagulation: Yes: Started on estradiol, nystatin, and cephalexin 7 day course, and stop taking the bactrim    Additional factors affecting anticoagulation: No    Plan     No adjustment to anticoagulation plan needed    Patient not contacted  Home care to check on 7/17 after patient resume warfarin tonight post procedure.  No adjustment to Anticoagulation Calendar was required    Iris Kemp RN

## 2020-07-13 NOTE — ANESTHESIA POSTPROCEDURE EVALUATION
Anesthesia POST Procedure Evaluation    Patient: Savanna Rehman   MRN:     [id number] Gender:   female   Age:    79 year old :      1940        Preoperative Diagnosis: Recurrent UTI [N39.0]   Procedure(s):  CYSTOSCOPY, WITH BLADDER BIOPSY   Postop Comments: No value filed.     Anesthesia Type: MAC       Disposition: Outpatient   Postop Pain Control: Uneventful            Sign Out: Well controlled pain   PONV: No   Neuro/Psych: Uneventful            Sign Out: Acceptable/Baseline neuro status   Airway/Respiratory: Uneventful            Sign Out: Acceptable/Baseline resp. status   CV/Hemodynamics: Uneventful            Sign Out: Acceptable CV status   Other NRE: NONE   DID A NON-ROUTINE EVENT OCCUR? No         Last Anesthesia Record Vitals:  CRNA VITALS  2020 0912 - 2020 1012      2020             NIBP:  116/68    NIBP Mean:  80    SpO2:  97 %          Last PACU Vitals:  Vitals Value Taken Time   /67 2020  9:45 AM   Temp 36.7  C (98.1  F) 2020  9:45 AM   Pulse 61 2020  9:45 AM   Resp 16 2020  9:45 AM   SpO2 98 % 2020  9:45 AM   Temp src     NIBP 116/68 2020  9:40 AM   Pulse 71 2020  9:39 AM   SpO2 97 % 2020  9:40 AM   Resp     Temp     Ht Rate 61 2020  9:36 AM   Temp 2           Electronically Signed By: Marvel Burns MD, 2020, 11:34 AM

## 2020-07-14 ENCOUNTER — TELEPHONE (OUTPATIENT)
Dept: INTERNAL MEDICINE | Facility: CLINIC | Age: 80
End: 2020-07-14

## 2020-07-14 LAB — COPATH REPORT: NORMAL

## 2020-07-17 ENCOUNTER — PATIENT OUTREACH (OUTPATIENT)
Dept: UROLOGY | Facility: CLINIC | Age: 80
End: 2020-07-17

## 2020-07-17 ENCOUNTER — NURSE TRIAGE (OUTPATIENT)
Dept: NURSING | Facility: CLINIC | Age: 80
End: 2020-07-17

## 2020-07-17 NOTE — TELEPHONE ENCOUNTER
Patient call today to let me know how she is doing since procedure with Dr. Wilson on Monday. Patient has no urological issues. She is urinating without any blood in her urine. She does not have any issues with having a bowel movement. She had several this week.   Patient is experiencing piercing pain in the back and front of her head. Patient rates her pain 5/10. Patient describes her pain as pounding squeezing my head into a vice- sensation. No sensitives to light or noise. She has history of migraines, but has not had one for many years. She is also reporting her right ear feels like she has a golf ball placed in it. She says her hands feels like pins and needles sticking her. She is unable to get out of bed. She called her RN daughter to let her know what was happening and she told her to go to the ER. Patient does not want to go to the ER, so she called the nurse line at her primary, they told her go to the ER. Now she calls me to tell me her symptoms and I told her I agreed wit everyone she has called. I repeatedly told he she should be checked in case it may something else going on. Patient told she thought it was the betadine that was used on her skin on Monday. This issue started a few hours ago. Then she tells me why she doesn't trust the ER doctors because they don't know her history and she has a lot of drug allergies, nineteen to be exact.  We also discussed her results for her bladder biopsy that was not cancer.   Patient says she will consider going to the ER.  Adelaida Liu RN   Care Coordinator Urology

## 2020-07-17 NOTE — TELEPHONE ENCOUNTER
"Patient declines Travel Screening; I have had fives tests and the last one was on 7/11/20\".  Patient states she is unsure if she is having a migraine, stroke, aneurysm.  \"It feels like a golf ball is pressing on my right temple, my right eye and ear hurt and my right hand is pins and needles\".  Tylenol provides little relief.  Has history of migraines, but never like this.  Live in senior living facility and staff won't see her as has no services; told her to be seen.  Advised to go to ED now.  Daughter is an RN and lives in [place] and could take her to [facility] ED.    Reason for Disposition    Unable to walk, or can only walk with assistance (e.g., requires support)    Additional Information    Negative: Difficult to awaken or acting confused (e.g., disoriented, slurred speech)    Negative: [1] Weakness of the face, arm or leg on one side of the body AND [2] new onset    Negative: [1] Numbness of the face, arm or leg on one side of the body AND [2] new onset    Negative: [1] Loss of speech or garbled speech AND [2] new onset    Negative: Passed out (i.e., lost consciousness, collapsed and was not responding)    Negative: Sounds like a life-threatening emergency to the triager    Negative: Followed a head injury    Negative: Pregnant    Negative: Postpartum (from 0 to 6 weeks after delivery)    Negative: Traumatic Brain Injury (TBI) is suspected    Protocols used: HEADACHE-A-AH      "

## 2020-07-20 ENCOUNTER — TELEPHONE (OUTPATIENT)
Dept: INTERNAL MEDICINE | Facility: CLINIC | Age: 80
End: 2020-07-20

## 2020-07-20 NOTE — TELEPHONE ENCOUNTER
Spoke with home care nurse Raj who stated INR was not drawn on 7/17 as instructed (patient canceled home visit appt.) but will be drawn tomorrow at 7/21

## 2020-07-20 NOTE — TELEPHONE ENCOUNTER
Order for additional SKILLED NURSING for additional INR checks received via fax. Form in your mailbox to be signed.

## 2020-07-21 ENCOUNTER — ANTICOAGULATION THERAPY VISIT (OUTPATIENT)
Dept: INTERNAL MEDICINE | Facility: CLINIC | Age: 80
End: 2020-07-21

## 2020-07-21 DIAGNOSIS — I48.20 CHRONIC ATRIAL FIBRILLATION (H): ICD-10-CM

## 2020-07-21 DIAGNOSIS — Z79.01 LONG TERM (CURRENT) USE OF ANTICOAGULANTS: ICD-10-CM

## 2020-07-21 LAB — INR PPP: 1.6 (ref 0.9–1.1)

## 2020-07-21 NOTE — PROGRESS NOTES
ANTICOAGULATION MANAGEMENT     Patient Name:  Savanna Rehman  Date:  2020    ASSESSMENT /SUBJECTIVE:    Today's INR result of 1.6 is subtherapeutic. Goal INR of 2.0-3.0      Warfarin dose taken: Less warfarin taken than instructed which may be affecting INR    Diet: No new diet changes affecting INR    Medication changes/ interactions: Interaction between cephalexin and warfarin may be affecting INR, will be on it for 3 months    Previous INR: Subtherapeutic     S/S of bleeding or thromboembolism: No    New injury or illness: Yes: was hospitalized on  for cystocopy    Upcoming surgery, procedure or cardioversion: No    Additional findings: None      PLAN:    Spoke with [facility], regarding INR result and instructed:     Warfarin Dosing Instructions: 5 mg tonight then continue your current warfarin dose of 3.75 mg on  and 2.5 mg all other days    Instructed patient to follow up no later than: 1 week  Orders given to  Homecare nurse/facility to recheck    Education provided: Monitoring for clotting signs and symptoms      jesus alberto Greco care verbalizes understanding and agrees to warfarin dosing plan.    Instructed to call the Anticoagulation Clinic for any changes, questions or concerns. (#[phone])        Iris Kemp RN      OBJECTIVE:  Recent labs: (last 7 days)     20   INR 1.6*         No question data found.  Anticoagulation Summary  As of 2020    INR goal:   2.0-3.0   TTR:   77.3 % (8.7 mo)   INR used for dosin.6! (2020)   Warfarin maintenance plan:   3.75 mg (2.5 mg x 1.5) every Mon; 2.5 mg (2.5 mg x 1) all other days   Full warfarin instructions:   : 5 mg; Otherwise 3.75 mg every Mon; 2.5 mg all other days   Weekly warfarin total:   18.75 mg   Plan last modified:   Azul Whitaker RN (2020)   Next INR check:   2020   Priority:   Maintenance   Target end date:   Indefinite    Indications    Permanent atrial fibrillation (H) (Resolved)  [I48.21]  Chronic atrial fibrillation (H) [I48.20]  Long term (current) use of anticoagulants [Z79.01]             Anticoagulation Episode Summary     INR check location:       Preferred lab:   EXTERNAL LAB    Send INR reminders to:   NIKKI CHASE    Comments:   Home care       Anticoagulation Care Providers     Provider Role Specialty Phone number    Patti Suárez MD [facility] Internal Medicine [phone]

## 2020-07-22 ENCOUNTER — PRE VISIT (OUTPATIENT)
Dept: UROLOGY | Facility: CLINIC | Age: 80
End: 2020-07-22

## 2020-07-22 NOTE — TELEPHONE ENCOUNTER
Reason for visit: review pathology     Relevant information: bladder biopys    Records/imaging/labs/orders: pathology available    Pt called: no need for a call

## 2020-07-23 ENCOUNTER — PATIENT OUTREACH (OUTPATIENT)
Dept: UROLOGY | Facility: CLINIC | Age: 80
End: 2020-07-23

## 2020-07-23 NOTE — TELEPHONE ENCOUNTER
Patient calling to says she does not see any improvement from the nystatin powder she is using. She has not started the estrogen cream yet because her vulva is to sore to touch.I reviewed how to use the estrogen cream a few weeks ago. She says it even hard to sit down.   Patient wants to know what the plan is.    Will update Dr. Steve Liu, RN   Care Coordinator Urology

## 2020-07-27 ENCOUNTER — PATIENT OUTREACH (OUTPATIENT)
Dept: UROLOGY | Facility: CLINIC | Age: 80
End: 2020-07-27

## 2020-07-27 ENCOUNTER — TELEPHONE (OUTPATIENT)
Dept: INTERNAL MEDICINE | Facility: CLINIC | Age: 80
End: 2020-07-27

## 2020-07-27 NOTE — TELEPHONE ENCOUNTER
Patient calls stating that she had surgery thru Urology and is very sore and raw in her vaginal area.  Patient spoke to Urology who already saw the area and they wanted her to take Nystatin Cream or Estrogen Cream but patient states it is too sore for her to use.  Patient states she is allergic to almost every medication in the world and does not know what she should do at this point.  Patient wears a Depends like product to she is some times sitting in a wet underwear.  Please address.

## 2020-07-27 NOTE — TELEPHONE ENCOUNTER
I don't have additional advice than the one offered by urology   Please see if she can get an appointment with OBGYN

## 2020-07-27 NOTE — TELEPHONE ENCOUNTER
"Patient calling to let you know her \"lady lips\" are worst and she is unable to sit down due to the pain. Patient has not used nystatin powder or estrogen cream. It's not suppose to be better which explained to her to try the powder.   She will today she tells me. She is scheduled to speak with you this Thursday at her phone appointment. She wants to know what she will do for the constant leakage.   Adelaida Liu RN   Care Coordinator Urology    "

## 2020-07-28 ENCOUNTER — ANTICOAGULATION THERAPY VISIT (OUTPATIENT)
Dept: INTERNAL MEDICINE | Facility: CLINIC | Age: 80
End: 2020-07-28

## 2020-07-28 ENCOUNTER — TELEPHONE (OUTPATIENT)
Dept: INTERNAL MEDICINE | Facility: CLINIC | Age: 80
End: 2020-07-28

## 2020-07-28 ENCOUNTER — MEDICAL CORRESPONDENCE (OUTPATIENT)
Dept: HEALTH INFORMATION MANAGEMENT | Facility: CLINIC | Age: 80
End: 2020-07-28

## 2020-07-28 DIAGNOSIS — I48.20 CHRONIC ATRIAL FIBRILLATION (H): ICD-10-CM

## 2020-07-28 DIAGNOSIS — Z79.01 LONG TERM (CURRENT) USE OF ANTICOAGULANTS: ICD-10-CM

## 2020-07-28 LAB — INR PPP: 2.1 (ref 0.9–1.1)

## 2020-07-28 NOTE — PROGRESS NOTES
ANTICOAGULATION MANAGEMENT     Patient Name:  Savanna Rehman  Date:  2020    ASSESSMENT /SUBJECTIVE:    Today's INR result of 2.1 is therapeutic. Goal INR of 2.0-3.0      Warfarin dose taken: Warfarin taken as previously instructed    Diet: No new diet changes affecting INR    Medication changes/ interactions: No new medications/supplements affecting INR    Previous INR: Subtherapeutic     S/S of bleeding or thromboembolism: No    New injury or illness: No    Upcoming surgery, procedure or cardioversion: No    Additional findings: None      PLAN:    Spoke with Nurse Raj regarding INR result and instructed:     Warfarin Dosing Instructions: Continue your current warfarin dose 3.75 mg Mon, 2.5 mg all other days    Instructed patient to follow up no later than: 1 week  Orders given to  Homecare nurse/facility to recheck    Education provided: Monitoring for bleeding signs and symptoms and Monitoring for clotting signs and symptoms      Nurse Raj verbalizes understanding and agrees to warfarin dosing plan.    Instructed to call the Anticoagulation Clinic for any changes, questions or concerns. (#[phone])        Azul Whitaker RN      OBJECTIVE:  Recent labs: (last 7 days)     20   INR 2.1*         No question data found.  Anticoagulation Summary  As of 2020    INR goal:   2.0-3.0   TTR:   75.8 % (9 mo)   INR used for dosin.1 (2020)   Warfarin maintenance plan:   3.75 mg (2.5 mg x 1.5) every Mon; 2.5 mg (2.5 mg x 1) all other days   Full warfarin instructions:   3.75 mg every Mon; 2.5 mg all other days   Weekly warfarin total:   18.75 mg   No change documented:   Azul Whitaker RN   Plan last modified:   Azul Whitaker RN (2020)   Next INR check:   2020   Priority:   Maintenance   Target end date:   Indefinite    Indications    Permanent atrial fibrillation (H) (Resolved) [I48.21]  Chronic atrial fibrillation (H) [I48.20]  Long term (current) use of anticoagulants  [Z79.01]             Anticoagulation Episode Summary     INR check location:       Preferred lab:   EXTERNAL LAB    Send INR reminders to:   NIKKI CHASE    Comments:   Home care       Anticoagulation Care Providers     Provider Role Specialty Phone number    Patti Suárez MD [facility] Internal Medicine [phone]

## 2020-07-30 ENCOUNTER — TELEPHONE (OUTPATIENT)
Dept: INTERNAL MEDICINE | Facility: CLINIC | Age: 80
End: 2020-07-30

## 2020-07-30 ENCOUNTER — VIRTUAL VISIT (OUTPATIENT)
Dept: UROLOGY | Facility: CLINIC | Age: 80
End: 2020-07-30
Payer: COMMERCIAL

## 2020-07-30 DIAGNOSIS — N39.41 URGENCY INCONTINENCE: ICD-10-CM

## 2020-07-30 DIAGNOSIS — I48.20 CHRONIC ATRIAL FIBRILLATION (H): ICD-10-CM

## 2020-07-30 DIAGNOSIS — B37.2 CANDIDIASIS OF SKIN: ICD-10-CM

## 2020-07-30 DIAGNOSIS — Z53.9 DIAGNOSIS NOT YET DEFINED: Primary | ICD-10-CM

## 2020-07-30 DIAGNOSIS — N32.81 URGENCY-FREQUENCY SYNDROME: Primary | ICD-10-CM

## 2020-07-30 DIAGNOSIS — E66.01 MORBID OBESITY (H): ICD-10-CM

## 2020-07-30 PROCEDURE — G0179 MD RECERTIFICATION HHA PT: HCPCS | Performed by: INTERNAL MEDICINE

## 2020-07-30 ASSESSMENT — PAIN SCALES - GENERAL: PAINLEVEL: MODERATE PAIN (5)

## 2020-07-30 NOTE — NURSING NOTE
Chief Complaint   Patient presents with     RECHECK     post op - review pathology - pt reports she has burning and itching on bottum and labia       not currently breastfeeding. There is no height or weight on file to calculate BMI.    Patient Active Problem List   Diagnosis     Angioedema     Morbid obesity (H)     CRISTIANA (obstructive sleep apnea) - CPAP     Recurrent UTI     Urgency-frequency syndrome     Urgency incontinence     Candidiasis of skin     Chronic atrial fibrillation (H)     Long term (current) use of anticoagulants     Hyperlipidemia LDL goal <100     Vitamin D deficiency     Spider veins     Gout     Gastroesophageal reflux disease     Anemia       Allergies   Allergen Reactions     Augmented Betamethasone Diprop [Betamethasone] Other (See Comments)     Severe yeast infection     Petroleum Jelly [Petrolatum] Anaphylaxis     Rash and swelling     Shellfish-Derived Products Anaphylaxis     Tongue swelling     Aspirin Swelling     tiongue swelling     Bacitracin      Rash swelling     Bactrim [Sulfamethoxazole W/Trimethoprim] Dizziness     Coumadin [Warfarin] Swelling     Leg swelling     Darvon [Propoxyphene] Swelling     Throat closes     Dilaudid [Hydromorphone]      Levaquin [Levofloxacin] Swelling     Tongue swelling     Neosporin [Neomycin-Polymyx-Gramicid] Swelling     rash     Nitrofurantoin      SOB, GI upset,     Oxycodone      Severe itching     Percodan [Oxycodone-Aspirin]      Severe itching     Tramadol      Vicodin [Hydrocodone-Acetaminophen]      Severe itching       Xarelto [Rivaroxaban]      Adhesive Tape Rash     Band aids      Codeine Rash       Current Outpatient Medications   Medication Sig Dispense Refill     alcohol swab prep pads Use to swab area of injection/chandrakant as directed. 100 each 3     balsalazide (COLAZAL) 750 MG capsule Take 2,250 mg by mouth 3 times daily       Biotin 5000 MCG TABS Take 5,000 mcg by mouth At Bedtime       blood glucose (NO BRAND SPECIFIED) test strip  Use to test blood sugar 1 time daily. To accompany: Blood Glucose Monitor Brands: Contour Next. 100 strip 1     blood glucose calibration (NO BRAND SPECIFIED) solution To accompany: Blood Glucose Monitor Brands: per insurance. 1 Bottle 3     blood glucose monitoring (NO BRAND SPECIFIED) meter device kit Use to test blood sugar 1 times daily. Preferred blood glucose meter OR supplies to accompany: Blood Glucose Monitor Brands: per insurance. 1 kit 0     cephALEXin (KEFLEX) 250 MG capsule Take 1 capsule (250 mg) by mouth daily 30 capsule 2     cholecalciferol (VITAMIN D3) 5000 units (125 mcg) capsule Take 5,000 Units by mouth At Bedtime       glipiZIDE (GLUCOTROL XL) 2.5 MG 24 hr tablet TAKE 1 TABLET DAILY 90 tablet 0     nystatin (MYCOSTATIN) 629667 UNIT/GM external powder Apply to bilateral groin area 2x daily as needed. 30 g 1     order for DME Oxygen 2 Li/min  at night bleed with CPAP       oxybutynin (DITROPAN) 5 MG tablet TAKE 1 TABLET TWICE A DAY 90 tablet 0     thin (NO BRAND SPECIFIED) lancets Use with lanceting device to check glucose once a day. To accompany: Blood Glucose Monitor Brands: per insurance. 100 each 3     warfarin ANTICOAGULANT (COUMADIN) 2.5 MG tablet TAKE AS INSTRUCTED BY YOUR PRESCRIBER 126 tablet 3     ACE/ARB/ARNI NOT PRESCRIBED (INTENTIONAL) Please choose reason not prescribed, below (Patient not taking: Reported on 7/30/2020)       B Complex-C (SUPER B COMPLEX PO) Take 1 tablet by mouth At Bedtime       BETA BLOCKER NOT PRESCRIBED (INTENTIONAL) Beta Blocker not prescribed intentionally due to Hypotension (SBP < 90) (Patient not taking: Reported on 7/30/2020)       estradiol (ESTRACE) 0.1 MG/GM vaginal cream Use a blueberry size amount to the vagina nightly for 2 weeks and then 3x a week thereafter (Patient not taking: Reported on 7/30/2020) 42.5 g 3     ranitidine (ZANTAC) 150 MG tablet Take 150 mg by mouth At Bedtime         Social History     Tobacco Use     Smoking status: Former  Smoker     Packs/day: 0.50     Years: 50.00     Pack years: 25.00     Types: Cigarettes     Last attempt to quit: 2014     Years since quittin.9     Smokeless tobacco: Never Used   Substance Use Topics     Alcohol use: Yes     Frequency: Monthly or less     Comment: socially     Drug use: Never       Amparo Perkins CMA  2020  9:50 AM

## 2020-07-30 NOTE — TELEPHONE ENCOUNTER
We can try an appointment for mobility assessment, please do not combine with other types of appointment and inform the patient

## 2020-07-30 NOTE — LETTER
"7/30/2020       RE: Savanna Rehmna  [address]  [facility] [address]     Dear Colleague,    Thank you for referring your patient, Savanna Rehman, to the [facility] UROLOGY AND INST FOR PROSTATE AND UROLOGIC CANCERS at [facility]. Please see a copy of my visit note below.    July 30, 2020    Savanna Rehman is a 79 year old female who is being evaluated via a billable telephone visit.      The patient has been notified of following:     \"This telephone visit will be conducted via a call between you and your physician/provider. We have found that certain health care needs can be provided without the need for a physical exam.  This service lets us provide the care you need with a short phone conversation.  If a prescription is necessary we can send it directly to your pharmacy.  If lab work is needed we can place an order for that and you can then stop by our lab to have the test done at a later time.    Telephone visits are billed at different rates depending on your insurance coverage. During this emergency period, for some insurers they may be billed the same as an in-person visit.  Please reach out to your insurance provider with any questions.    If during the course of the call the physician/provider feels a telephone visit is not appropriate, you will not be charged for this service.\"    Patient has given verbal consent for Telephone visit?  Yes    What phone number would you like to be contacted at? [phone]    How would you like to obtain your AVS? Marymarie    Patient has elected a telephone call for today's follow up.  Called patient today and verified her name and date of birth prior to discussion.    Today's telephone visit is to discuss how she is doing.  She states that her biggest issue is that she cannot leave her house because of transportation.      She states that her \"lady part\" hurts and is irritated.  She did not use the nystatin powder until " yesterday when her home nurse told patient that there looked to be a yeast infection.  Patient also states that for the discomfort she started taking some leftover antibiotics that she had at home.    Her next question is what to do about her urinary incontinence    Patient denies any changes to her past medical, surgical or social history.  Patient denies any changes to her medications or allergies    FINAL DIAGNOSIS:   A. 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  Will coordinate UDS and appointment with me on the same day at the [facility]    19 minutes were spent in patient care today    Deisy Wilson MD MPH   of Urology    CC  Patient Care Team:  Patti Suárez MD as PCP - General (Internal Medicine)  Patti Suárez MD as Assigned PCP  Patti Suárez MD as Referring Physician (Internal Medicine)  Deisy Wilson MD as MD (Urology)  Adelaida Liu, RN as Specialty Care Coordinator (Urology)  PATTI SUÁREZ

## 2020-07-30 NOTE — PATIENT INSTRUCTIONS
Use the nystatin powder    We will schedule you for urodynamics and an appointment with Dr Wilson at the [facility] [address] [place] [address]    Websites with free information:    [employer] patient website: www.voicesforpfd.org    Total Control Program: www.totalcontrolprogram.com    It was a pleasure meeting with you today.  Thank you for allowing me and my team the privilege of caring for you today.  YOU are the reason we are here, and I truly hope we provided you with the excellent service you deserve.  Please let us know if there is anything else we can do for you so that we can be sure you are leaving completely satisfied with your care experience.

## 2020-07-30 NOTE — PROGRESS NOTES
"July 30, 2020    Savanna Rehman is a 79 year old female who is being evaluated via a billable telephone visit.      The patient has been notified of following:     \"This telephone visit will be conducted via a call between you and your physician/provider. We have found that certain health care needs can be provided without the need for a physical exam.  This service lets us provide the care you need with a short phone conversation.  If a prescription is necessary we can send it directly to your pharmacy.  If lab work is needed we can place an order for that and you can then stop by our lab to have the test done at a later time.    Telephone visits are billed at different rates depending on your insurance coverage. During this emergency period, for some insurers they may be billed the same as an in-person visit.  Please reach out to your insurance provider with any questions.    If during the course of the call the physician/provider feels a telephone visit is not appropriate, you will not be charged for this service.\"    Patient has given verbal consent for Telephone visit?  Yes    What phone number would you like to be contacted at? [phone]    How would you like to obtain your AVS? Adamaris    Patient has elected a telephone call for today's follow up.  Called patient today and verified her name and date of birth prior to discussion.    Today's telephone visit is to discuss how she is doing.  She states that her biggest issue is that she cannot leave her house because of transportation.      She states that her \"lady part\" hurts and is irritated.  She did not use the nystatin powder until yesterday when her home nurse told patient that there looked to be a yeast infection.  Patient also states that for the discomfort she started taking some leftover antibiotics that she had at home.    Her next question is what to do about her urinary incontinence    Patient denies any changes to her past medical, surgical or " social history.  Patient denies any changes to her medications or allergies    FINAL DIAGNOSIS:   A. 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  Will coordinate UDS and appointment with me on the same day at the [facility]    19 minutes were spent in patient care today    Deisy Wilson MD MPH   of Urology    CC  Patient Care Team:  Patti Suárez MD as PCP - General (Internal Medicine)  Patti Suárez MD as Assigned PCP  Patti Suárez MD as Referring Physician (Internal Medicine)  Deisy Wilson MD as MD (Urology)  Adelaida Liu, RN as Specialty Care Coordinator (Urology)  PATTI SUÁREZ

## 2020-08-04 NOTE — TELEPHONE ENCOUNTER
Explained to patient that Medicare requires a face to face visit to discuss need for medical equipment like a walker and that the other issues would need to be addressed at a separate visit.  Patient states she may just pay for the walker herself and forget about Medicare but wants to first speak with her  about this.  She will call back to tell us what her visit this Thursday will be for.  BENITA Graham R.N.

## 2020-08-04 NOTE — TELEPHONE ENCOUNTER
Patient calls stating that she does not want to waist time coming in to be seen when she needs a walker to get around.  Patient does not drive and will not take a cab with COVID-19.  Patient [place] to get the [place] to [place] walk[place] and needs further documentation.  Patient got a walker a year ago but they did not ask her height and she needs to get one that works for her without having to walk bend over.  Patient does not want to make a seperate appointment to address the walker.  Patient is requesting to drop off a note stating what she needs Dr. Lauren to state for her to get a walker.    Patient states she is using Nystatin cream and the yeast infection seems to be getting better.    Patient is having cluster migraines and her daughter is an RN and wants her to start a medication.    Patient explained her urology situation she has been dealing with and sleeping for 28 hours straight and stretching out her bladder.    If patient is not going to drop off a note for the walker than she wants to discuss the improving yeast infection, the migraine medications, and leave a note for the walker instructions all at the appointment on Thursday.    Please clarify what will be addressed at appointment with patient prior to Thursday.

## 2020-08-05 ENCOUNTER — ANTICOAGULATION THERAPY VISIT (OUTPATIENT)
Dept: INTERNAL MEDICINE | Facility: CLINIC | Age: 80
End: 2020-08-05

## 2020-08-05 DIAGNOSIS — I48.20 CHRONIC ATRIAL FIBRILLATION (H): ICD-10-CM

## 2020-08-05 DIAGNOSIS — Z79.01 LONG TERM (CURRENT) USE OF ANTICOAGULANTS: ICD-10-CM

## 2020-08-05 LAB — INR PPP: 2.4 (ref 0.9–1.1)

## 2020-08-05 NOTE — PROGRESS NOTES
ANTICOAGULATION MANAGEMENT     Patient Name:  Savanna Rehman  Date:  2020    ASSESSMENT /SUBJECTIVE:    Today's INR result of 2.4 is therapeutic. Goal INR of 2.0-3.0      Warfarin dose taken: Missed dose(s) may be affecting INR    Diet: No new diet changes affecting INR    Medication changes/ interactions: No new medications/supplements affecting INR    Previous INR: Therapeutic     S/S of bleeding or thromboembolism: No    New injury or illness: No    Upcoming surgery, procedure or cardioversion: No    Additional findings: None      PLAN:    Spoke with Jayla regarding INR result and instructed:     Warfarin Dosing Instructions: Continue your current warfarin dose 3.75mg Monday & 2.5mg AOD    Instructed patient to follow up no later than: 1 week  Orders given to  Homecare nurse/facility to recheck    Education provided: Please call back if any changes to your diet, medications or how you've been taking warfarin      Evelin verbalizes understanding and agrees to warfarin dosing plan.    Instructed to call the Anticoagulation Clinic for any changes, questions or concerns. (#[phone])        Sanjana Fox RN      OBJECTIVE:  Recent labs: (last 7 days)     20   INR 2.4*         No question data found.  Anticoagulation Summary  As of 2020    INR goal:   2.0-3.0   TTR:   76.5 % (9.2 mo)   INR used for dosin.4 (2020)   Warfarin maintenance plan:   3.75 mg (2.5 mg x 1.5) every Mon; 2.5 mg (2.5 mg x 1) all other days   Full warfarin instructions:   3.75 mg every Mon; 2.5 mg all other days   Weekly warfarin total:   18.75 mg   No change documented:   Sanjana Fox RN   Plan last modified:   Azul Whitaker RN (2020)   Next INR check:   2020   Priority:   Maintenance   Target end date:   Indefinite    Indications    Permanent atrial fibrillation (H) (Resolved) [I48.21]  Chronic atrial fibrillation (H) [I48.20]  Long term (current) use of anticoagulants [Z79.01]              Anticoagulation Episode Summary     INR check location:       Preferred lab:   EXTERNAL LAB    Send INR reminders to:   NIKKI CHASE    Comments:   Home care       Anticoagulation Care Providers     Provider Role Specialty Phone number    Patti Suárez MD [facility] Internal Medicine [phone]         Sanjana Green RN, BSN, PHN

## 2020-08-06 ENCOUNTER — OFFICE VISIT (OUTPATIENT)
Dept: INTERNAL MEDICINE | Facility: CLINIC | Age: 80
End: 2020-08-06
Payer: COMMERCIAL

## 2020-08-06 VITALS — OXYGEN SATURATION: 98 % | RESPIRATION RATE: 16 BRPM | HEART RATE: 81 BPM | WEIGHT: 244 LBS | BODY MASS INDEX: 37.1 KG/M2

## 2020-08-06 DIAGNOSIS — R26.81 UNSTEADY GAIT: ICD-10-CM

## 2020-08-06 DIAGNOSIS — R21 RASH AND NONSPECIFIC SKIN ERUPTION: Primary | ICD-10-CM

## 2020-08-06 DIAGNOSIS — E11.40 TYPE 2 DIABETES MELLITUS WITH DIABETIC NEUROPATHY, WITHOUT LONG-TERM CURRENT USE OF INSULIN (H): ICD-10-CM

## 2020-08-06 PROCEDURE — 99214 OFFICE O/P EST MOD 30 MIN: CPT | Performed by: INTERNAL MEDICINE

## 2020-08-07 ENCOUNTER — TELEPHONE (OUTPATIENT)
Dept: INTERNAL MEDICINE | Facility: CLINIC | Age: 80
End: 2020-08-07

## 2020-08-12 ENCOUNTER — ANTICOAGULATION THERAPY VISIT (OUTPATIENT)
Dept: INTERNAL MEDICINE | Facility: CLINIC | Age: 80
End: 2020-08-12

## 2020-08-12 DIAGNOSIS — I48.20 CHRONIC ATRIAL FIBRILLATION (H): ICD-10-CM

## 2020-08-12 DIAGNOSIS — Z79.01 LONG TERM (CURRENT) USE OF ANTICOAGULANTS: ICD-10-CM

## 2020-08-12 LAB — INR PPP: 2.3 (ref 0.9–1.1)

## 2020-08-12 NOTE — PROGRESS NOTES
Dr Lauren's note            ASSESSMENT & PLAN:                                                      (R21) Ra and nonspecific skin eruption  (primary encounter diagnosis)  Comment:   Plan: I advised her to use nystatin powder.  She still has plenty at home    (R26.81) Unsteady gait  (E11.40) Type 2 diabetes mellitus with diabetic neuropathy, without long-term current use of insulin (H)  Comment: She needs a walker with 4 wheels and seat adjusted for her height  Plan: Miscellaneous Order for DME - ONLY FOR DME               Chief complaint:                                                      Skin rash   Needs walker assessment    SUBJECTIVE:                                                    History of present illness:    On Abx x 3 months - prescribed by urology, dr Wilson    Skin rash on the inguinal and perineal area x weeks. She admits she hasn't used the nystatin      Arm sensitive - no blood pressure taking correctly    Walker  -- she was prescribed a walker May 2020  -- she is tall 1.727 and the walker is too short for her. She has to hunch over and it causes a lot of back pain   -- she feels very unsteady on her feet and sometimes dizzy.   -- she can't walk without holding on something.   -- she becomes easily tired and she needs to rest.  -- while she is cooking she sits on the walker       Hyperlipidemia Follow-Up      Are you regularly taking any medication or supplement to lower your cholesterol?   No    Are you having muscle aches or other side effects that you think could be caused by your cholesterol lowering medication?  No      How many servings of fruits and vegetables do you eat daily?  0-1    On average, how many sweetened beverages do you drink each day (Examples: soda, juice, sweet tea, etc.  Do NOT count diet or artificially sweetened beverages)?   1    How many days per week do you exercise enough to make your heart beat faster? 3 or less    How many minutes a day do you exercise enough  none to make your heart beat faster? 9 or less    How many days per week do you miss taking your medication? 0      Review of Systems:                                                      ROS: negative for fever, chills, cough, wheezes, chest pain, shortness of breath, vomiting, abdominal pain, leg swelling           OBJECTIVE:             Physical exam:  Pulse 81, resp. rate 16, weight 110.7 kg (244 lb), SpO2 98 %, not currently breastfeeding.   NAD, appears comfortable  Skin: Rash in the inguinal and perineal area, no open wounds  Neck: supple, no JVD,  No thyroidmegaly. Lymph nodes nonpalpable cervical and supraclavicular.  Chest: clear to auscultation bilaterally, good respiratory effort  Heart: S1 S2, RRR, no mgr appreciated  Abdomen: soft, not tender,   Extremities: no edema,   Neurologic: A, Ox3, no focal signs appreciated  Ms-skeletal: Unsteady on her feet without the walker.  Indeed her walker is too short for her height.  She hunches over when she walks with his walker    PMHx: reviewed  Past Medical History:   Diagnosis Date     Anemia     Iron Deficiency anemia     Atrial fibrillation (H)      CAD (coronary artery disease)     non-obstructive     Chronic pain     neck, low back, legs     Congestive heart failure (H)      Degenerative disk disease      Fibromyalgia      Gastro-oesophageal reflux disease      Gout      Hiatal hernia      Mumps      Neuropathy      Palpitations      Pernicious anemia      Sleep apnea     uses CPAP.     Urinary incontinence      Vitamin D deficiency       PSHx: reviewed  Past Surgical History:   Procedure Laterality Date     APPENDECTOMY       CHOLECYSTECTOMY       COLONOSCOPY  3/15/2011     CORONARY ANGIOGRAPHY ADULT ORDER       CYSTOSCOPY, BIOPSY BLADDER, COMBINED N/A 7/13/2020    Procedure: CYSTOSCOPY, WITH BLADDER BIOPSY;  Surgeon: Deisy Wilson MD;  Location: [facility] OR     HEART CATH LEFT HEART CATH  12/30/16    medication management     HYSTERECTOMY TOTAL ABDOMINAL        Knee replacement NOS Left      LAPAROSCOPIC NISSEN FUNDOPLICATION N/A 2/4/2015    Procedure: LAPAROSCOPIC NISSEN FUNDOPLICATION;  Surgeon: Armando Ansari MD;  Location: [facility] OR     TONSILLECTOMY       TRANSPOSITION ULNAR NERVE (ELBOW)          Meds: reviewed  Current Outpatient Medications   Medication Sig Dispense Refill     alcohol swab prep pads Use to swab area of injection/chandrakant as directed. 100 each 3     Biotin 5000 MCG TABS Take 5,000 mcg by mouth At Bedtime       blood glucose (NO BRAND SPECIFIED) test strip Use to test blood sugar 1 time daily. To accompany: Blood Glucose Monitor Brands: Contour Next. 100 strip 1     blood glucose calibration (NO BRAND SPECIFIED) solution To accompany: Blood Glucose Monitor Brands: per insurance. 1 Bottle 3     blood glucose monitoring (NO BRAND SPECIFIED) meter device kit Use to test blood sugar 1 times daily. Preferred blood glucose meter OR supplies to accompany: Blood Glucose Monitor Brands: per insurance. 1 kit 0     cephALEXin (KEFLEX) 250 MG capsule Take 1 capsule (250 mg) by mouth daily 30 capsule 2     cholecalciferol (VITAMIN D3) 5000 units (125 mcg) capsule Take 5,000 Units by mouth At Bedtime       glipiZIDE (GLUCOTROL XL) 2.5 MG 24 hr tablet TAKE 1 TABLET DAILY 90 tablet 0     nystatin (MYCOSTATIN) 552063 UNIT/GM external powder Apply to bilateral groin area 2x daily as needed. 30 g 1     order for DME Oxygen 2 Li/min  at night bleed with CPAP       oxybutynin (DITROPAN) 5 MG tablet TAKE 1 TABLET TWICE A DAY 90 tablet 0     ranitidine (ZANTAC) 150 MG tablet Take 150 mg by mouth At Bedtime       thin (NO BRAND SPECIFIED) lancets Use with lanceting device to check glucose once a day. To accompany: Blood Glucose Monitor Brands: per insurance. 100 each 3     warfarin ANTICOAGULANT (COUMADIN) 2.5 MG tablet TAKE AS INSTRUCTED BY YOUR PRESCRIBER 126 tablet 3     ACE/ARB/ARNI NOT PRESCRIBED (INTENTIONAL) Please choose reason not prescribed, below (Patient not  taking: Reported on 7/30/2020)       B Complex-C (SUPER B COMPLEX PO) Take 1 tablet by mouth At Bedtime       balsalazide (COLAZAL) 750 MG capsule Take 2,250 mg by mouth 3 times daily       BETA BLOCKER NOT PRESCRIBED (INTENTIONAL) Beta Blocker not prescribed intentionally due to Hypotension (SBP < 90) (Patient not taking: Reported on 7/30/2020)       estradiol (ESTRACE) 0.1 MG/GM vaginal cream Use a blueberry size amount to the vagina nightly for 2 weeks and then 3x a week thereafter (Patient not taking: Reported on 7/30/2020) 42.5 g 3       Soc Hx: reviewed  Fam Hx: reviewed          Ptati Lauren MD  Internal Medicine

## 2020-08-12 NOTE — PROGRESS NOTES
ANTICOAGULATION MANAGEMENT     Patient Name:  Savanna Rehman  Date:  2020    ASSESSMENT /SUBJECTIVE:    Today's INR result of 2.3 is therapeutic. Goal INR of 2.0-3.0      Warfarin dose taken: Warfarin taken as previously instructed    Diet: No new diet changes affecting INR    Medication changes/ interactions: No new medications/supplements affecting INR    Previous INR: Therapeutic     S/S of bleeding or thromboembolism: No    New injury or illness: No    Upcoming surgery, procedure or cardioversion: No    Additional findings: None      PLAN:    Spoke with home care nurse Evelin regarding INR result and instructed:     Warfarin Dosing Instructions: Continue your current warfarin dose 3.75mg Mon and 2.5mg rest of week    Instructed patient to follow up no later than: 2 weeks  Orders given to  Homecare nurse/facility to recheck    Education provided: None required      Evelin verbalizes understanding and agrees to warfarin dosing plan.    Instructed to call the Anticoagulation Clinic for any changes, questions or concerns. (#[phone])        Danika Petit RN      OBJECTIVE:  Recent labs: (last 7 days)     20   INR 2.3*         No question data found.  Anticoagulation Summary  As of 2020    INR goal:   2.0-3.0   TTR:   77.1 % (9.5 mo)   INR used for dosin.3 (2020)   Warfarin maintenance plan:   3.75 mg (2.5 mg x 1.5) every Mon; 2.5 mg (2.5 mg x 1) all other days   Full warfarin instructions:   3.75 mg every Mon; 2.5 mg all other days   Weekly warfarin total:   18.75 mg   Plan last modified:   Azul Whitaker RN (2020)   Next INR check:   2020   Priority:   Maintenance   Target end date:   Indefinite    Indications    Permanent atrial fibrillation (H) (Resolved) [I48.21]  Chronic atrial fibrillation (H) [I48.20]  Long term (current) use of anticoagulants [Z79.01]             Anticoagulation Episode Summary     INR check location:       Preferred lab:   EXTERNAL LAB     Send INR reminders to:   NIKKI CHASE    Comments:   Home care       Anticoagulation Care Providers     Provider Role Specialty Phone number    Patti Suárez MD [facility] Internal Medicine [phone]

## 2020-08-14 ENCOUNTER — VIRTUAL VISIT (OUTPATIENT)
Dept: INTERNAL MEDICINE | Facility: CLINIC | Age: 80
End: 2020-08-14
Payer: COMMERCIAL

## 2020-08-14 DIAGNOSIS — R51.9 ACUTE INTRACTABLE HEADACHE, UNSPECIFIED HEADACHE TYPE: Primary | ICD-10-CM

## 2020-08-14 PROCEDURE — 99214 OFFICE O/P EST MOD 30 MIN: CPT | Mod: 95 | Performed by: INTERNAL MEDICINE

## 2020-08-14 RX ORDER — BUTALBITAL, ASPIRIN, AND CAFFEINE 325; 50; 40 MG/1; MG/1; MG/1
1 CAPSULE ORAL EVERY 6 HOURS PRN
Qty: 30 CAPSULE | Refills: 0 | Status: SHIPPED | OUTPATIENT
Start: 2020-08-14 | End: 2021-03-03

## 2020-08-14 NOTE — PROGRESS NOTES
"Savanna Rehman is a 79 year old female who is being evaluated via a billable video visit.      The patient has been notified of following:     \"This video visit will be conducted via a call between you and your physician/provider. We have found that certain health care needs can be provided without the need for an in-person physical exam.  This service lets us provide the care you need with a video conversation.  If a prescription is necessary we can send it directly to your pharmacy.  If lab work is needed we can place an order for that and you can then stop by our lab to have the test done at a later time.    Video visits are billed at different rates depending on your insurance coverage.  Please reach out to your insurance provider with any questions.    If during the course of the call the physician/provider feels a video visit is not appropriate, you will not be charged for this service.\"    Patient has given verbal consent for Video visit? Yes  How would you like to obtain your AVS? Mail a copy  If you are dropped from the video visit, the video invite should be resent to: Text to cell phone: [phone]  Will anyone else be joining your video visit? No            This is a VIDEO ( using Doximity)  encounter with the patient.       Location of the provider : home   Location of the patient : home          10:13--10:34         Dr Lauren's note      Patient's instructions / PLAN:                                                        Plan:  1. Fiorinal as neede  2. Brain MRI - To schedule this test you may call Scheduling center at [phone]          ASSESSMENT & PLAN:                                                      (R51) Acute intractable headache, unspecified headache type  (primary encounter diagnosis)  Comment: possible again migraine but she needs MRI   Plan: butalbital-aspirin-caffeine (FIORINAL)         -40 MG capsule, MR Brain w/o Contrast               Chief complaint:                    "                                   HA    SUBJECTIVE:                                                    History of present illness:    HA  -- she had bladder surgery on July 13 and on July 15 she woke up with severe HA.  -- HA since then   -- she used to have migraines in the past which would respond to Fiorinal   -- everyday she has HA, but the intensity varies.   - many years ago she was treated with demerol and after sleeping for 10-18 h the HA resolved   -- she feels stiff neck     HTN  -- BP        Hyperlipidemia Follow-Up      Are you regularly taking any medication or supplement to lower your cholesterol?   No    Are you having muscle aches or other side effects that you think could be caused by your cholesterol lowering medication?  No      How many servings of fruits and vegetables do you eat daily?  0-1    On average, how many sweetened beverages do you drink each day (Examples: soda, juice, sweet tea, etc.  Do NOT count diet or artificially sweetened beverages)?   0    How many days per week do you exercise enough to make your heart beat faster? 3 or less    How many minutes a day do you exercise enough to make your heart beat faster? 9 or less    How many days per week do you miss taking your medication? 0        Review of Systems:                                                      ROS: negative for fever, chills, cough, wheezes, chest pain, shortness of breath, vomiting, abdominal pain, leg swelling         OBJECTIVE:           An actual physical exam can't be done during phone visit   A limited exam can sometimes be performed by video visit       PMHx: reviewed  Past Medical History:   Diagnosis Date     Anemia     Iron Deficiency anemia     Atrial fibrillation (H)      CAD (coronary artery disease)     non-obstructive     Chronic pain     neck, low back, legs     Congestive heart failure (H)      Degenerative disk disease      Fibromyalgia      Gastro-oesophageal reflux disease      Gout      Hiatal  hernia      Mumps      Neuropathy      Palpitations      Pernicious anemia      Sleep apnea     uses CPAP.     Urinary incontinence      Vitamin D deficiency       PSHx: reviewed  Past Surgical History:   Procedure Laterality Date     APPENDECTOMY       CHOLECYSTECTOMY       COLONOSCOPY  3/15/2011     CORONARY ANGIOGRAPHY ADULT ORDER       CYSTOSCOPY, BIOPSY BLADDER, COMBINED N/A 7/13/2020    Procedure: CYSTOSCOPY, WITH BLADDER BIOPSY;  Surgeon: Deisy Wilson MD;  Location: [facility] OR     HEART CATH LEFT HEART CATH  12/30/16    medication management     HYSTERECTOMY TOTAL ABDOMINAL       Knee replacement NOS Left      LAPAROSCOPIC NISSEN FUNDOPLICATION N/A 2/4/2015    Procedure: LAPAROSCOPIC NISSEN FUNDOPLICATION;  Surgeon: Armando Ansari MD;  Location: [facility] OR     TONSILLECTOMY       TRANSPOSITION ULNAR NERVE (ELBOW)          Meds: reviewed  Current Outpatient Medications   Medication Sig Dispense Refill     ACE/ARB/ARNI NOT PRESCRIBED (INTENTIONAL) Please choose reason not prescribed, below       alcohol swab prep pads Use to swab area of injection/chandrakant as directed. 100 each 3     B Complex-C (SUPER B COMPLEX PO) Take 1 tablet by mouth At Bedtime       BETA BLOCKER NOT PRESCRIBED (INTENTIONAL) Beta Blocker not prescribed intentionally due to Hypotension (SBP < 90)       Biotin 5000 MCG TABS Take 5,000 mcg by mouth At Bedtime       blood glucose (NO BRAND SPECIFIED) test strip Use to test blood sugar 1 time daily. To accompany: Blood Glucose Monitor Brands: Contour Next. 100 strip 1     blood glucose calibration (NO BRAND SPECIFIED) solution To accompany: Blood Glucose Monitor Brands: per insurance. 1 Bottle 3     blood glucose monitoring (NO BRAND SPECIFIED) meter device kit Use to test blood sugar 1 times daily. Preferred blood glucose meter OR supplies to accompany: Blood Glucose Monitor Brands: per insurance. 1 kit 0     cephALEXin (KEFLEX) 250 MG capsule Take 1 capsule (250 mg) by mouth daily 30 capsule 2      cholecalciferol (VITAMIN D3) 5000 units (125 mcg) capsule Take 5,000 Units by mouth At Bedtime       glipiZIDE (GLUCOTROL XL) 2.5 MG 24 hr tablet TAKE 1 TABLET DAILY 90 tablet 0     nystatin (MYCOSTATIN) 537472 UNIT/GM external powder Apply to bilateral groin area 2x daily as needed. 30 g 1     oxybutynin (DITROPAN) 5 MG tablet TAKE 1 TABLET TWICE A DAY 90 tablet 0     thin (NO BRAND SPECIFIED) lancets Use with lanceting device to check glucose once a day. To accompany: Blood Glucose Monitor Brands: per insurance. 100 each 3     warfarin ANTICOAGULANT (COUMADIN) 2.5 MG tablet TAKE AS INSTRUCTED BY YOUR PRESCRIBER 126 tablet 3     balsalazide (COLAZAL) 750 MG capsule Take 2,250 mg by mouth 3 times daily       order for DME Oxygen 2 Li/min  at night bleed with CPAP (Patient not taking: Reported on 8/14/2020)       ranitidine (ZANTAC) 150 MG tablet Take 150 mg by mouth At Bedtime         Soc Hx: reviewed  Fam Hx: reviewed          Patti Lauren MD  Internal Medicine

## 2020-08-19 ENCOUNTER — ANTICOAGULATION THERAPY VISIT (OUTPATIENT)
Dept: INTERNAL MEDICINE | Facility: CLINIC | Age: 80
End: 2020-08-19

## 2020-08-19 DIAGNOSIS — I48.20 CHRONIC ATRIAL FIBRILLATION (H): ICD-10-CM

## 2020-08-19 DIAGNOSIS — N32.81 URGENCY-FREQUENCY SYNDROME: ICD-10-CM

## 2020-08-19 DIAGNOSIS — Z79.01 LONG TERM (CURRENT) USE OF ANTICOAGULANTS: ICD-10-CM

## 2020-08-19 LAB — INR PPP: 2.2 (ref 0.9–1.1)

## 2020-08-20 NOTE — TELEPHONE ENCOUNTER
Pending Prescriptions:                       Disp   Refills    oxybutynin (DITROPAN) 5 MG tablet [Pharmac*90 tab*7        Sig: TAKE 1 TABLET TWICE A DAY

## 2020-08-23 RX ORDER — OXYBUTYNIN CHLORIDE 5 MG/1
TABLET ORAL
Qty: 90 TABLET | Refills: 1 | Status: SHIPPED | OUTPATIENT
Start: 2020-08-23 | End: 2021-02-18

## 2020-08-27 ENCOUNTER — ANTICOAGULATION THERAPY VISIT (OUTPATIENT)
Dept: INTERNAL MEDICINE | Facility: CLINIC | Age: 80
End: 2020-08-27

## 2020-08-27 DIAGNOSIS — I48.20 CHRONIC ATRIAL FIBRILLATION (H): ICD-10-CM

## 2020-08-27 DIAGNOSIS — Z79.01 LONG TERM (CURRENT) USE OF ANTICOAGULANTS: ICD-10-CM

## 2020-08-27 LAB — INR PPP: 1.6 (ref 0.9–1.1)

## 2020-08-27 NOTE — PROGRESS NOTES
ANTICOAGULATION MANAGEMENT     Patient Name:  Savanna Rehman  Date:  2020    ASSESSMENT /SUBJECTIVE:    Today's INR result of 1.6 is subtherapeutic. Goal INR of 2.0-3.0      Warfarin dose taken: Warfarin taken as previously instructed    Diet: No new diet changes affecting INR    Medication changes/ interactions: Interaction between Fiorinal and warfarin may be affecting INR - can decrease warfarin effectiveness    Previous INR: Therapeutic     S/S of bleeding or thromboembolism: No    New injury or illness: No    Upcoming surgery, procedure or cardioversion: No    Additional findings: None      PLAN:    Spoke with Evelin home care nurse, regarding INR result and instructed:     Warfarin Dosing Instructions: Take 3.75 mg today only then change your warfarin dose to 3.75 mg every Mon, Fri; 2.5 mg all other days    Instructed patient to follow up no later than: 1 week  Orders given to  Homecare nurse/facility to recheck    Education provided: Target INR goal and significance of current INR result, Potential interaction between warfarin and fiorinal and Importance of notifying clinic for changes in medications      Evelin verbalizes understanding and agrees to warfarin dosing plan.    Instructed to call the Anticoagulation Clinic for any changes, questions or concerns. (#[phone])        Patricia Smith, CLIFFORD      OBJECTIVE:  Recent labs: (last 7 days)     20   INR 1.6*         No question data found.  Anticoagulation Summary  As of 2020    INR goal:   2.0-3.0   TTR:   76.4 % (10 mo)   INR used for dosin.6! (2020)   Warfarin maintenance plan:   3.75 mg (2.5 mg x 1.5) every Mon, Fri; 2.5 mg (2.5 mg x 1) all other days   Full warfarin instructions:   : 3.75 mg; Otherwise 3.75 mg every Mon, Fri; 2.5 mg all other days   Weekly warfarin total:   20 mg   Plan last modified:   Patricia Smith, RN (2020)   Next INR check:   9/3/2020   Priority:   High   Target end date:   Indefinite     Indications    Permanent atrial fibrillation (H) (Resolved) [I48.21]  Chronic atrial fibrillation (H) [I48.20]  Long term (current) use of anticoagulants [Z79.01]             Anticoagulation Episode Summary     INR check location:       Preferred lab:   EXTERNAL LAB    Send INR reminders to:   NIKKI CHASE    Comments:   Home care       Anticoagulation Care Providers     Provider Role Specialty Phone number    Patti Suárez MD [facility] Internal Medicine [phone]

## 2020-08-28 ENCOUNTER — TELEPHONE (OUTPATIENT)
Dept: INTERNAL MEDICINE | Facility: CLINIC | Age: 80
End: 2020-08-28

## 2020-08-28 ENCOUNTER — MEDICAL CORRESPONDENCE (OUTPATIENT)
Dept: HEALTH INFORMATION MANAGEMENT | Facility: CLINIC | Age: 80
End: 2020-08-28

## 2020-09-02 ENCOUNTER — TELEPHONE (OUTPATIENT)
Dept: INTERNAL MEDICINE | Facility: CLINIC | Age: 80
End: 2020-09-02

## 2020-09-02 NOTE — TELEPHONE ENCOUNTER
Raj with homecare calling to report blood sugars. 160, 183,135, 203, 177, 22. Patient also reports dizziness and fatigue  Raj phone [phone]  Patient said metformin has not worked well for her in the past.

## 2020-09-03 ENCOUNTER — ANTICOAGULATION THERAPY VISIT (OUTPATIENT)
Dept: INTERNAL MEDICINE | Facility: CLINIC | Age: 80
End: 2020-09-03

## 2020-09-03 ENCOUNTER — VIRTUAL VISIT (OUTPATIENT)
Dept: INTERNAL MEDICINE | Facility: CLINIC | Age: 80
End: 2020-09-03
Payer: COMMERCIAL

## 2020-09-03 VITALS
TEMPERATURE: 97.6 F | OXYGEN SATURATION: 97 % | SYSTOLIC BLOOD PRESSURE: 112 MMHG | HEART RATE: 49 BPM | DIASTOLIC BLOOD PRESSURE: 72 MMHG

## 2020-09-03 DIAGNOSIS — Z79.01 LONG TERM (CURRENT) USE OF ANTICOAGULANTS: ICD-10-CM

## 2020-09-03 DIAGNOSIS — E11.9 TYPE 2 DIABETES MELLITUS WITHOUT COMPLICATION, WITHOUT LONG-TERM CURRENT USE OF INSULIN (H): Primary | ICD-10-CM

## 2020-09-03 DIAGNOSIS — I48.20 CHRONIC ATRIAL FIBRILLATION (H): ICD-10-CM

## 2020-09-03 LAB — INR PPP: 1.4 (ref 0.9–1.1)

## 2020-09-03 PROCEDURE — 99213 OFFICE O/P EST LOW 20 MIN: CPT | Mod: 95 | Performed by: NURSE PRACTITIONER

## 2020-09-03 NOTE — NURSING NOTE
"Chief Complaint   Patient presents with     Diabetes     discuss blood sugars, blood pressure,pulse     initial /72   Pulse (!) 49   Temp 97.6  F (36.4  C) (Oral)   LMP  (LMP Unknown)   SpO2 97%  Estimated body mass index is 37.1 kg/m  as calculated from the following:    Height as of 7/13/20: 1.727 m (5' 8\").    Weight as of 8/6/20: 110.7 kg (244 lb)..  bp completed using cuff size NA (Not Taken)  JOSE BOWIE LPN  "

## 2020-09-03 NOTE — PROGRESS NOTES
".Savanna Rehman is a 79 year old female who is being evaluated via a billable video visit.      The patient has been notified of following:     \"This video visit will be conducted via a call between you and your physician/provider. We have found that certain health care needs can be provided without the need for an in-person physical exam.  This service lets us provide the care you need with a video conversation.  If a prescription is necessary we can send it directly to your pharmacy.  If lab work is needed we can place an order for that and you can then stop by our lab to have the test done at a later time.    Video visits are billed at different rates depending on your insurance coverage.  Please reach out to your insurance provider with any questions.    If during the course of the call the physician/provider feels a video visit is not appropriate, you will not be charged for this service.\"    Patient has given verbal consent for Video visit? Yes  How would you like to obtain your AVS? Mail a copy  If you are dropped from the video visit, the video invite should be resent to: Text to cell phone: [phone]  Will anyone else be joining your video visit? No      Subjective     Savanna Rehman is a 79 year old female who presents today via video visit for the following health issues:    HPI    Diabetes Follow-up    How often are you checking your blood sugar? Two times daily  Blood sugar testing frequency justification:  pt states blood sugars have been all over the place  What time of day are you checking your blood sugars (select all that apply)?  Before meals  Have you had any blood sugars above 200?  Yes 222 today  Have you had any blood sugars below 70?  No    What symptoms do you notice when your blood sugar is low?  None    What concerns do you have today about your diabetes? Blood sugar is often over 200     Do you have any of these symptoms? (Select all that apply)  Numbness in feet and Blurry " vision  Have you had a diabetic eye exam in the last 12 months? No  X 1 1/2 yearsago      BP Readings from Last 2 Encounters:   09/03/20 112/72   07/13/20 130/82     Hemoglobin A1C (%)   Date Value   03/04/2020 6.6 (H)   11/16/2019 6.4 (H)     LDL Cholesterol Calculated (mg/dL)   Date Value   03/04/2020 111 (H)   11/16/2019 113 (H)                 How many servings of fruits and vegetables do you eat daily?  0-1    On average, how many sweetened beverages do you drink each day (Examples: soda, juice, sweet tea, etc.  Do NOT count diet or artificially sweetened beverages)?   0    How many days per week do you exercise enough to make your heart beat faster? 3 or less    How many minutes a day do you exercise enough to make your heart beat faster? 9 or less    How many days per week do you miss taking your medication? 0        Video Start Time: 4:45 PM    Patient wanting appt due to concern about elevated blood sugars recently.  Just saw her PCP Dr. Lauren on 8/14/2020 for Hyperlipidemia.  Last lab for glucose on 7/13/2020 was 132 with last A1C on 3/4/2020 was 6.6.  Currently on Glipizide XL 2.5 mg daily.  Usually does not check her blood sugars except the nurse stopped by and encouraged me to do so.  Reading ranging from 135 to 203 some in AM and some in PM (no particular order)    States she is having a MRI done in about 2 weeks for her migraines and dizziness.    Review of Systems   Constitutional, HEENT, cardiovascular, pulmonary, gi and gu systems are negative, except as otherwise noted.      Objective           Vitals:  No vitals were obtained today due to virtual visit.    Physical Exam     GENERAL: alert and no distress  EYES: Eyes grossly normal to inspection.  No discharge or erythema, or obvious scleral/conjunctival abnormalities.  RESP: No audible wheeze, cough, or visible cyanosis.  No visible retractions or increased work of breathing.    SKIN: Visible skin clear. No significant rash, abnormal  "pigmentation or lesions.  NEURO: Cranial nerves grossly intact.  Mentation and speech appropriate for age.  PSYCH: Mentation appears normal, affect normal/bright, judgement and insight intact, normal speech and appearance well-groomed.              Assessment & Plan     Type 2 diabetes mellitus without complication, without long-term current use of insulin (H)  - will check lab next week:  - Basic metabolic panel; Future  - Hemoglobin A1c; Future  - for now continue with Glipizide 2.5 mg daily  - discussed briefly diet and exercise; does not get out much these days     BMI:   Estimated body mass index is 37.1 kg/m  as calculated from the following:    Height as of 7/13/20: 1.727 m (5' 8\").    Weight as of 8/6/20: 110.7 kg (244 lb).           Patient instructions:  See above.    Return in about 2 months (around 11/3/2020) for Physical Exam, In-Clinic Visit; medicare wellness.    Bel Stoner CNP  [facility]      Video-Visit Details    Type of service:  Video Visit    Video End Time:5:05 PM    Originating Location (pt. Location): Home    Distant Location (provider location):  [facility]     Platform used for Video Visit: Gila          "

## 2020-09-03 NOTE — PROGRESS NOTES
ANTICOAGULATION MANAGEMENT     Patient Name:  Savanna Rehman  Date:  9/3/2020    ASSESSMENT /SUBJECTIVE:    Today's INR result of 1.4 is subtherapeutic. Goal INR of 2.0-3.0      Warfarin dose taken: Warfarin taken as previously instructed    Diet: No new diet changes affecting INR    Medication changes/ interactions: No new medications/supplements affecting INR    Previous INR: Subtherapeutic     S/S of bleeding or thromboembolism: No    New injury or illness: No    Upcoming surgery, procedure or cardioversion: No    Additional findings: None      PLAN:    Spoke with JENNA Anthony RN regarding INR result and instructed:     Warfarin Dosing Instructions: 3.75 mg tonight then change maintenance dose to 2.5 mg MWF: 32.75 mg ROW    Instructed patient to follow up no later than: 1 week  Orders given to  Homecare nurse/facility to recheck    Education provided: Monitoring for clotting signs and symptoms and When to seek medical attention/emergency care      Raj verbalizes understanding and agrees to warfarin dosing plan.    Instructed to call the Anticoagulation Clinic for any changes, questions or concerns. (#[phone])        Phoebe Cobian, CLIFFORD      OBJECTIVE:  Recent labs: (last 7 days)     20   INR 1.4*         No question data found.  Anticoagulation Summary  As of 9/3/2020    INR goal:   2.0-3.0   TTR:   74.7 % (10.2 mo)   INR used for dosin.4! (9/3/2020)   Warfarin maintenance plan:   2.5 mg (2.5 mg x 1) every Mon, Wed, Fri; 3.75 mg (2.5 mg x 1.5) all other days   Full warfarin instructions:   9/3: 3.75 mg; Otherwise 2.5 mg every Mon, Wed, Fri; 3.75 mg all other days   Weekly warfarin total:   22.5 mg   Plan last modified:   Rukhsana Flores RN (9/3/2020)   Next INR check:   9/10/2020   Priority:   High   Target end date:   Indefinite    Indications    Permanent atrial fibrillation (H) (Resolved) [I48.21]  Chronic atrial fibrillation (H) [I48.20]  Long term (current) use of  anticoagulants [Z79.01]             Anticoagulation Episode Summary     INR check location:       Preferred lab:   EXTERNAL LAB    Send INR reminders to:   NIKKI CHASE    Comments:   Home care       Anticoagulation Care Providers     Provider Role Specialty Phone number    Patti Suárez MD [facility] Internal Medicine [phone]

## 2020-09-03 NOTE — PATIENT INSTRUCTIONS
For now will continue with Glybizide 2.5 mg daily    Come in next week for labs: someone will schedule you and call    Follow-up in 2 months for Medicare wellness visit

## 2020-09-04 ENCOUNTER — TELEPHONE (OUTPATIENT)
Dept: INTERNAL MEDICINE | Facility: CLINIC | Age: 80
End: 2020-09-04

## 2020-09-04 DIAGNOSIS — E11.42 DIABETIC POLYNEUROPATHY ASSOCIATED WITH TYPE 2 DIABETES MELLITUS (H): ICD-10-CM

## 2020-09-04 NOTE — TELEPHONE ENCOUNTER
Medicare covers only once a day blood sugar checked, since she does not take insulin and since A1c is controlled

## 2020-09-04 NOTE — TELEPHONE ENCOUNTER
Please see message below and advise. Current prescription reads to test once daily. It is not noted in 9/3/20 virtual visit how often patient should be testing.     If more than once a day, please advise so prescription can be adjusted.

## 2020-09-04 NOTE — TELEPHONE ENCOUNTER
Pharmacy calls stating that the patient advised them she is testing her blood sugars two to three times per day.  If this correct?   If so, please send new RX to pharmacy or advise.

## 2020-09-09 ENCOUNTER — PRE VISIT (OUTPATIENT)
Dept: UROLOGY | Facility: CLINIC | Age: 80
End: 2020-09-09

## 2020-09-09 NOTE — TELEPHONE ENCOUNTER
Reason for visit: review UDS     Relevant information: Omar    Records/imaging/labs/orders: all appointments scheduled    Pt called: no need for a call    At Rooming: standard

## 2020-09-10 ENCOUNTER — TELEPHONE (OUTPATIENT)
Dept: INTERNAL MEDICINE | Facility: CLINIC | Age: 80
End: 2020-09-10

## 2020-09-10 ENCOUNTER — ANTICOAGULATION THERAPY VISIT (OUTPATIENT)
Dept: INTERNAL MEDICINE | Facility: CLINIC | Age: 80
End: 2020-09-10

## 2020-09-10 DIAGNOSIS — D64.9 ANEMIA, UNSPECIFIED TYPE: ICD-10-CM

## 2020-09-10 DIAGNOSIS — Z79.01 LONG TERM (CURRENT) USE OF ANTICOAGULANTS: ICD-10-CM

## 2020-09-10 DIAGNOSIS — I48.20 CHRONIC ATRIAL FIBRILLATION (H): ICD-10-CM

## 2020-09-10 DIAGNOSIS — Z53.9 DIAGNOSIS NOT YET DEFINED: Primary | ICD-10-CM

## 2020-09-10 DIAGNOSIS — R42 DIZZINESS: Primary | ICD-10-CM

## 2020-09-10 LAB — INR PPP: 1.8 (ref 0.9–1.1)

## 2020-09-10 PROCEDURE — G0179 MD RECERTIFICATION HHA PT: HCPCS | Performed by: INTERNAL MEDICINE

## 2020-09-10 NOTE — PROGRESS NOTES
ANTICOAGULATION MANAGEMENT     Patient Name:  Savanna Rehman  Date:  9/10/2020    ASSESSMENT /SUBJECTIVE:    Today's INR result of 1.8 is subtherapeutic. Goal INR of 2.0-3.0      Warfarin dose taken: Warfarin taken as previously instructed    Diet: No diet changes    Medication changes/ interactions: No new medications/supplements affecting INR    Previous INR: Subtherapeutic     S/S of bleeding or thromboembolism: No    New injury or illness: No    Upcoming surgery, procedure or cardioversion: No    Additional findings: None      PLAN:    Spoke with Raj WEST RN regarding INR result and instructed:     Warfarin Dosing Instructions: Continue your current warfarin dose 2.5 mg MWF; 3.75 mg ROW    Instructed patient to follow up no later than: 1 week  Orders given to  Homecare nurse/facility to recheck    Education provided: None required      aRj verbalizes understanding and agrees to warfarin dosing plan.    Instructed to call the Anticoagulation Clinic for any changes, questions or concerns. (#[phone])        Phoebe Cobian RN      OBJECTIVE:  Recent labs: (last 7 days)     09/10/20   INR 1.8*         No question data found.  Anticoagulation Summary  As of 9/10/2020    INR goal:   2.0-3.0   TTR:   73.0 % (10.4 mo)   INR used for dosin.8! (9/10/2020)   Warfarin maintenance plan:   2.5 mg (2.5 mg x 1) every Mon, Wed, Fri; 3.75 mg (2.5 mg x 1.5) all other days   Full warfarin instructions:   2.5 mg every Mon, Wed, Fri; 3.75 mg all other days   Weekly warfarin total:   22.5 mg   No change documented:   Rukhsana Flores RN   Plan last modified:   Rukhsana Flores, RN (9/3/2020)   Next INR check:   2020   Priority:   High   Target end date:   Indefinite    Indications    Permanent atrial fibrillation (H) (Resolved) [I48.21]  Chronic atrial fibrillation (H) [I48.20]  Long term (current) use of anticoagulants [Z79.01]             Anticoagulation Episode Summary     INR check location:        Preferred lab:   EXTERNAL LAB    Send INR reminders to:   NIKKI CHASE    Comments:   Home care       Anticoagulation Care Providers     Provider Role Specialty Phone number    Patti Suárez MD [facility] Internal Medicine [phone]

## 2020-09-10 NOTE — TELEPHONE ENCOUNTER
Patient is coming in for lab tests tomorrow 9/11/20 and would like to add lab test to check her iron level due to recent dizziness. Please place orders in epic

## 2020-09-11 DIAGNOSIS — N39.0 RECURRENT UTI: Primary | ICD-10-CM

## 2020-09-11 NOTE — TELEPHONE ENCOUNTER
Per chart, patient's appointment was changed to 9/14/20.    Primary care provider, please see message below and advise.

## 2020-09-11 NOTE — PROGRESS NOTES
Per Azra Mendoza PA-C, patient will need to do a UA/UC prior to her Urodynamics Study next week to rule out UTI.  Orders placed.  I did speak with the patient, but she continued to yell at me and repeated over and over that we don't know anything and we don't know what we are doing here at the .  I did tell her twice that if her urine looks infected on the day of her study, we may have to reschedule.  She continued to be rude to me-  I then ended the call with the patient.      Alyssa Macias, CMA

## 2020-09-14 ENCOUNTER — PRE VISIT (OUTPATIENT)
Dept: UROLOGY | Facility: CLINIC | Age: 80
End: 2020-09-14

## 2020-09-14 DIAGNOSIS — E11.9 TYPE 2 DIABETES MELLITUS WITHOUT COMPLICATION, WITHOUT LONG-TERM CURRENT USE OF INSULIN (H): ICD-10-CM

## 2020-09-14 DIAGNOSIS — N39.0 RECURRENT UTI: ICD-10-CM

## 2020-09-14 DIAGNOSIS — R42 DIZZINESS: ICD-10-CM

## 2020-09-14 LAB
ALBUMIN UR-MCNC: NEGATIVE MG/DL
APPEARANCE UR: ABNORMAL
BACTERIA #/AREA URNS HPF: ABNORMAL /HPF
BILIRUB UR QL STRIP: NEGATIVE
COLOR UR AUTO: YELLOW
ERYTHROCYTE [DISTWIDTH] IN BLOOD BY AUTOMATED COUNT: 13.4 % (ref 10–15)
GLUCOSE UR STRIP-MCNC: NEGATIVE MG/DL
HBA1C MFR BLD: 6.4 % (ref 0–5.6)
HCT VFR BLD AUTO: 44.2 % (ref 35–47)
HGB BLD-MCNC: 15.1 G/DL (ref 11.7–15.7)
HGB UR QL STRIP: ABNORMAL
KETONES UR STRIP-MCNC: NEGATIVE MG/DL
LEUKOCYTE ESTERASE UR QL STRIP: ABNORMAL
MCH RBC QN AUTO: 30.9 PG (ref 26.5–33)
MCHC RBC AUTO-ENTMCNC: 34.2 G/DL (ref 31.5–36.5)
MCV RBC AUTO: 91 FL (ref 78–100)
NITRATE UR QL: POSITIVE
NON-SQ EPI CELLS #/AREA URNS LPF: ABNORMAL /LPF
PH UR STRIP: 5 PH (ref 5–7)
PLATELET # BLD AUTO: 217 10E9/L (ref 150–450)
RBC # BLD AUTO: 4.88 10E12/L (ref 3.8–5.2)
RBC #/AREA URNS AUTO: ABNORMAL /HPF
SOURCE: ABNORMAL
SP GR UR STRIP: 1.02 (ref 1–1.03)
UROBILINOGEN UR STRIP-ACNC: 0.2 EU/DL (ref 0.2–1)
WBC # BLD AUTO: 7.3 10E9/L (ref 4–11)
WBC #/AREA URNS AUTO: >100 /HPF

## 2020-09-14 PROCEDURE — 81001 URINALYSIS AUTO W/SCOPE: CPT | Performed by: PHYSICIAN ASSISTANT

## 2020-09-14 PROCEDURE — 87186 SC STD MICRODIL/AGAR DIL: CPT | Performed by: PHYSICIAN ASSISTANT

## 2020-09-14 PROCEDURE — 80048 BASIC METABOLIC PNL TOTAL CA: CPT | Performed by: INTERNAL MEDICINE

## 2020-09-14 PROCEDURE — 83036 HEMOGLOBIN GLYCOSYLATED A1C: CPT | Performed by: INTERNAL MEDICINE

## 2020-09-14 PROCEDURE — 87088 URINE BACTERIA CULTURE: CPT | Performed by: PHYSICIAN ASSISTANT

## 2020-09-14 PROCEDURE — 36415 COLL VENOUS BLD VENIPUNCTURE: CPT | Performed by: INTERNAL MEDICINE

## 2020-09-14 PROCEDURE — 87086 URINE CULTURE/COLONY COUNT: CPT | Performed by: PHYSICIAN ASSISTANT

## 2020-09-14 PROCEDURE — 85027 COMPLETE CBC AUTOMATED: CPT | Performed by: INTERNAL MEDICINE

## 2020-09-14 NOTE — TELEPHONE ENCOUNTER
Patient has decided that she will not have iron studies drawn as she will not pay out of pocket or sign a waiver.    She states she has a history of anemia and wants a CBC drawn and will be into the lab shortly for her appt.  Please order CBC if in agreement, diagnosis of dizziness.  BENITA Graham R.N.

## 2020-09-15 ENCOUNTER — TELEPHONE (OUTPATIENT)
Dept: UROLOGY | Facility: CLINIC | Age: 80
End: 2020-09-15

## 2020-09-15 ENCOUNTER — TELEPHONE (OUTPATIENT)
Dept: INTERNAL MEDICINE | Facility: CLINIC | Age: 80
End: 2020-09-15

## 2020-09-15 ENCOUNTER — HOSPITAL ENCOUNTER (OUTPATIENT)
Dept: MRI IMAGING | Facility: CLINIC | Age: 80
Discharge: HOME | End: 2020-09-15
Attending: INTERNAL MEDICINE | Admitting: INTERNAL MEDICINE
Payer: COMMERCIAL

## 2020-09-15 DIAGNOSIS — R51.9 ACUTE INTRACTABLE HEADACHE, UNSPECIFIED HEADACHE TYPE: ICD-10-CM

## 2020-09-15 DIAGNOSIS — N39.0 URINARY TRACT INFECTION: Primary | ICD-10-CM

## 2020-09-15 LAB
ANION GAP SERPL CALCULATED.3IONS-SCNC: 7 MMOL/L (ref 3–14)
BUN SERPL-MCNC: 16 MG/DL (ref 7–30)
CALCIUM SERPL-MCNC: 9.4 MG/DL (ref 8.5–10.1)
CHLORIDE SERPL-SCNC: 106 MMOL/L (ref 94–109)
CO2 SERPL-SCNC: 24 MMOL/L (ref 20–32)
CREAT SERPL-MCNC: 0.96 MG/DL (ref 0.52–1.04)
GFR SERPL CREATININE-BSD FRML MDRD: 57 ML/MIN/{1.73_M2}
GLUCOSE SERPL-MCNC: 113 MG/DL (ref 70–99)
POTASSIUM SERPL-SCNC: 4.1 MMOL/L (ref 3.4–5.3)
SODIUM SERPL-SCNC: 137 MMOL/L (ref 133–144)

## 2020-09-15 PROCEDURE — 70551 MRI BRAIN STEM W/O DYE: CPT

## 2020-09-15 RX ORDER — CEFDINIR 300 MG/1
300 CAPSULE ORAL 2 TIMES DAILY
Qty: 10 CAPSULE | Refills: 0 | Status: SHIPPED | OUTPATIENT
Start: 2020-09-15 | End: 2020-09-20

## 2020-09-15 NOTE — TELEPHONE ENCOUNTER
----- Message from Azra Mendoza PA-C sent at 9/15/2020  8:55 AM CDT -----  Hi Max,  The UA doesn't look good... probably should have been a cathed UA/UC, but too late at this point.   I think our best chance to keep her UDS/cysto as scheduled would be to start cefdinir 300 mg BID x 5 days, hoping that the bacteria are sensitive. We should hopefully have culture results back tomorrow to know for sure if she can still come in. Would you mind sending the Rx? Ideally, she needs to start this today. She should hold the daily Keflex while on cefdinir.    Thanks!  Azra  ----- Message -----  From: Adelaida Liu RN  Sent: 9/15/2020   8:16 AM CDT  To: Azra Mendoza PA-C    Azra   Did you see her UA? She is on low dose keflex currently with positive nitrates. It usually takes the full 48 hours to get results back on her. She has no symptoms at this point. Please see her list of allergies. Not sure if we can hyde this date. She is a hard one. So sorry her urine should have been done a week ago.   Let me know what you want to do with her so you don't waste the spot.   Adelaida

## 2020-09-15 NOTE — TELEPHONE ENCOUNTER
Notified patient of the plan for antibiotics starting today and wait for the sensitivities to make sure this antibiotic covers her bacteria so we can proceed as planned. Patient is agreeing to the plan. Patient denies symptoms at this point. She does have incontinence.   Will check sensitivities tomorrow  Adelaida Liu RN   Care Coordinator Urology

## 2020-09-16 ENCOUNTER — TELEPHONE (OUTPATIENT)
Dept: INTERNAL MEDICINE | Facility: CLINIC | Age: 80
End: 2020-09-16

## 2020-09-16 ENCOUNTER — ANTICOAGULATION THERAPY VISIT (OUTPATIENT)
Dept: INTERNAL MEDICINE | Facility: CLINIC | Age: 80
End: 2020-09-16

## 2020-09-16 DIAGNOSIS — I48.20 CHRONIC ATRIAL FIBRILLATION (H): ICD-10-CM

## 2020-09-16 DIAGNOSIS — R42 DIZZINESS: Primary | ICD-10-CM

## 2020-09-16 DIAGNOSIS — N39.0 URINARY TRACT INFECTION: Primary | ICD-10-CM

## 2020-09-16 DIAGNOSIS — Z79.01 LONG TERM (CURRENT) USE OF ANTICOAGULANTS: ICD-10-CM

## 2020-09-16 LAB
BACTERIA SPEC CULT: ABNORMAL
INR PPP: 2.3 (ref 0.9–1.1)
SPECIMEN SOURCE: ABNORMAL

## 2020-09-16 RX ORDER — SULFAMETHOXAZOLE/TRIMETHOPRIM 800-160 MG
1 TABLET ORAL 2 TIMES DAILY
Qty: 10 TABLET | Refills: 0 | Status: SHIPPED | OUTPATIENT
Start: 2020-09-16 | End: 2020-09-21

## 2020-09-16 NOTE — PROGRESS NOTES
ANTICOAGULATION MANAGEMENT     Patient Name:  Savanna Rehman  Date:  2020    ASSESSMENT /SUBJECTIVE:    Today's INR result of 2.3 is therapeutic. Goal INR of 2.0-3.0      Warfarin dose taken: Warfarin taken as previously instructed    Diet: No new diet changes affecting INR    Medication changes/ interactions: Started cefdinir 9/15 for UTI. Switching to Bactrim today. Major interaction increased risk for bleeding.    Previous INR: Subtherapeutic     S/S of bleeding or thromboembolism: No    New injury or illness: Yes: UTI    Upcoming surgery, procedure or cardioversion: No    Additional findings: None      PLAN:    Spoke with Raj, home care RN, regarding INR result and instructed:     Warfarin Dosing Instructions: Continue your current warfarin dose 2.5 mg MWF and 3.75 mg ROW    Instructed patient to follow up no later than: 3 day recheck preferred but unfortunately that would fall on a Saturday. Will recheck again in 2 days to better appropriately dose for the weekend.  Orders given to  Homecare nurse/facility to recheck    Education provided: None required      Raj verbalizes understanding and agrees to warfarin dosing plan.    Instructed to call the Anticoagulation Clinic for any changes, questions or concerns. (#[phone])        Danika Keith RN      OBJECTIVE:  Recent labs: (last 7 days)     09/10/20 09/16/20   INR 1.8* 2.3*         No question data found.  Anticoagulation Summary  As of 2020    INR goal:   2.0-3.0   TTR:   72.8 % (10.6 mo)   INR used for dosin.3 (2020)   Warfarin maintenance plan:   2.5 mg (2.5 mg x 1) every Mon, Wed, Fri; 3.75 mg (2.5 mg x 1.5) all other days   Full warfarin instructions:   2.5 mg every Mon, Wed, Fri; 3.75 mg all other days   Weekly warfarin total:   22.5 mg   No change documented:   Danika Keith RN   Plan last modified:   Rukhsana Flores RN (9/3/2020)   Next INR check:   2020   Priority:   High   Target end date:    Indefinite    Indications    Permanent atrial fibrillation (H) (Resolved) [I48.21]  Chronic atrial fibrillation (H) [I48.20]  Long term (current) use of anticoagulants [Z79.01]             Anticoagulation Episode Summary     INR check location:       Preferred lab:   EXTERNAL LAB    Send INR reminders to:   NIKKI CHASE    Comments:   Home care       Anticoagulation Care Providers     Provider Role Specialty Phone number    Patti Suárez MD [facility] Internal Medicine [phone]

## 2020-09-18 ENCOUNTER — ANTICOAGULATION THERAPY VISIT (OUTPATIENT)
Dept: INTERNAL MEDICINE | Facility: CLINIC | Age: 80
End: 2020-09-18

## 2020-09-18 DIAGNOSIS — Z79.01 LONG TERM (CURRENT) USE OF ANTICOAGULANTS: ICD-10-CM

## 2020-09-18 DIAGNOSIS — I48.20 CHRONIC ATRIAL FIBRILLATION (H): ICD-10-CM

## 2020-09-18 LAB — INR PPP: 2.5 (ref 0.9–1.1)

## 2020-09-18 NOTE — PROGRESS NOTES
ANTICOAGULATION MANAGEMENT     Patient Name:  Savanna Rehman  Date:  2020    ASSESSMENT /SUBJECTIVE:    Today's INR result of 2.5 is therapeutic. Goal INR of 2.0-3.0      Warfarin dose taken: Warfarin taken as previously instructed    Diet: No new diet changes affecting INR    Medication changes/ interactions: OMNICEF 9/15- & BACTRIM - for UTI    Previous INR: Therapeutic     S/S of bleeding or thromboembolism: No    New injury or illness: Yes: UTI    Upcoming surgery, procedure or cardioversion: No    Additional findings: None      PLAN:    Spoke with Roseline Galvan RN regarding INR result and instructed:     Warfarin Dosing Instructions: Continue your current warfarin dose 2.5mg MWF & 3.75mg AOD    Instructed patient to follow up no later than: 5 days  Orders given to  Homecare nurse/facility to recheck    Education provided: Potential interaction between warfarin and antibiotics       Roseline RN verbalizes understanding and agrees to warfarin dosing plan.    Instructed to call the Anticoagulation Clinic for any changes, questions or concerns. (#[phone])        Sanjana Fox RN      OBJECTIVE:  Recent labs: (last 7 days)     20   INR 2.3* 2.5*         No question data found.  Anticoagulation Summary  As of 2020    INR goal:   2.0-3.0   TTR:   72.9 % (10.7 mo)   INR used for dosin.5 (2020)   Warfarin maintenance plan:   2.5 mg (2.5 mg x 1) every Mon, Wed, Fri; 3.75 mg (2.5 mg x 1.5) all other days   Full warfarin instructions:   2.5 mg every Mon, Wed, Fri; 3.75 mg all other days   Weekly warfarin total:   22.5 mg   No change documented:   Sanjana Fox, RN   Plan last modified:   Rukhsana Flores RN (9/3/2020)   Next INR check:   2020   Priority:   High   Target end date:   Indefinite    Indications    Permanent atrial fibrillation (H) (Resolved) [I48.21]  Chronic atrial fibrillation (H) [I48.20]  Long term (current) use of anticoagulants [Z79.01]              Anticoagulation Episode Summary     INR check location:       Preferred lab:   EXTERNAL LAB    Send INR reminders to:   NIKKI CHASE    Comments:   Home care       Anticoagulation Care Providers     Provider Role Specialty Phone number    Patti Suárez MD [facility] Internal Medicine [phone]         Sanjana Green RN, BSN, PHN

## 2020-09-19 NOTE — TELEPHONE ENCOUNTER
Please call the patient:    This is the recent brain MRI. It doesn't show signs of stroke or tumors.   It shows diffuse changes: atrophy and white matter changes. We see these changes often coming with age. I assume you have more questions and  I made a referral to see neurologist to review this result. You will need to call to make the appointment.   [facility] [place] [phone]      Please send letter( look for it in the letter section) + results

## 2020-09-22 ENCOUNTER — ANTICOAGULATION THERAPY VISIT (OUTPATIENT)
Dept: INTERNAL MEDICINE | Facility: CLINIC | Age: 80
End: 2020-09-22

## 2020-09-22 ENCOUNTER — MEDICAL CORRESPONDENCE (OUTPATIENT)
Dept: HEALTH INFORMATION MANAGEMENT | Facility: CLINIC | Age: 80
End: 2020-09-22

## 2020-09-22 DIAGNOSIS — E11.42 DIABETIC POLYNEUROPATHY ASSOCIATED WITH TYPE 2 DIABETES MELLITUS (H): ICD-10-CM

## 2020-09-22 DIAGNOSIS — I48.20 CHRONIC ATRIAL FIBRILLATION (H): ICD-10-CM

## 2020-09-22 DIAGNOSIS — Z79.01 LONG TERM (CURRENT) USE OF ANTICOAGULANTS: ICD-10-CM

## 2020-09-22 LAB — INR PPP: 2.9 (ref 0.9–1.1)

## 2020-09-22 NOTE — TELEPHONE ENCOUNTER
Pending Prescriptions:                       Disp   Refills    glipiZIDE (GLUCOTROL XL) 2.5 MG 24 hr tabl*90 tab*3        Sig: TAKE 1 TABLET DAILY

## 2020-09-22 NOTE — PROGRESS NOTES
ANTICOAGULATION MANAGEMENT     Patient Name:  Savanna Rehman  Date:  2020    ASSESSMENT /SUBJECTIVE:    Today's INR result of 2/9 is therapeutic. Goal INR of 2.0-3.0      Warfarin dose taken: Warfarin taken as instructed    Diet: No new diet changes affecting INR    Medication changes/ interactions: one more day of Abx remains    Previous INR: Therapeutic     S/S of bleeding or thromboembolism: No    New injury or illness: No    Upcoming surgery, procedure or cardioversion: Yes: DDS visit on  for denture fitting; would like INR 2.5 or less.  HC RN will check INR that morning.  Will hold 20 dose (< by 12%) to ensure lower INR.     Additional findings: None      PLAN:    Telephone call with home care nurse Raj regarding INR result and instructed:     Warfarin Dosing Instructions: Maintenance dose of 2.5 mg MWF; 3.75 mg ROW except 20 hold for upcoming DDS visit    Instructed patient to follow up no later than: 8 days  Orders given to  Homecare nurse/facility to recheck    Education provided: None required      Raj verbalizes understanding and agrees to warfarin dosing plan.    Instructed to call the Anticoagulation Clinic for any changes, questions or concerns. (#[phone])        Phoebe Cobian, RN      OBJECTIVE:  Recent labs: (last 7 days)     20   INR 2.3* 2.5* 2.9*         INR assessment THER    Recheck INR In: 8 DAYS    INR Location Homecare INR      Anticoagulation Summary  As of 2020    INR goal:   2.0-3.0   TTR:   73.3 % (10.8 mo)   INR used for dosin.9 (2020)   Warfarin maintenance plan:   2.5 mg (2.5 mg x 1) every Mon, Wed, Fri; 3.75 mg (2.5 mg x 1.5) all other days   Full warfarin instructions:   : Hold; Otherwise 2.5 mg every Mon, Wed, Fri; 3.75 mg all other days   Weekly warfarin total:   22.5 mg   Plan last modified:   Rukhsana Flores, RN (9/3/2020)   Next INR check:   2020   Priority:   High   Target end  date:   Indefinite    Indications    Permanent atrial fibrillation (H) (Resolved) [I48.21]  Chronic atrial fibrillation (H) [I48.20]  Long term (current) use of anticoagulants [Z79.01]             Anticoagulation Episode Summary     INR check location:       Preferred lab:   EXTERNAL LAB    Send INR reminders to:   NIKKI CHASE    Comments:   Home care       Anticoagulation Care Providers     Provider Role Specialty Phone number    Patti Suárez MD [facility] Internal Medicine [phone]

## 2020-09-23 ENCOUNTER — CARE COORDINATION (OUTPATIENT)
Dept: SLEEP MEDICINE | Facility: CLINIC | Age: 80
End: 2020-09-23

## 2020-09-23 NOTE — PROGRESS NOTES
Faxed signed cmn for oxygen to [facility]. Copy of signed cmn sent to HIM for scan into epic.

## 2020-09-24 RX ORDER — GLIPIZIDE 2.5 MG/1
TABLET, EXTENDED RELEASE ORAL
Qty: 90 TABLET | Refills: 0 | Status: SHIPPED | OUTPATIENT
Start: 2020-09-24 | End: 2020-11-30

## 2020-09-25 DIAGNOSIS — I48.20 CHRONIC ATRIAL FIBRILLATION (H): Primary | ICD-10-CM

## 2020-09-25 NOTE — PROGRESS NOTES
ANTICOAGULATION MANAGEMENT      Savanna Rehman due for annual renewal of referral to anticoagulation monitoring. Order pended for your review and signature.      ANTICOAGULATION SUMMARY      Warfarin indication(s)     Atrial fibrillation    Heart valve present?  NO       Current goal range   INR: 2.0-3.0     Goal appropriate for indication? Yes, INR 2-3 appropriate for hx of DVT, PE, hypercoagulable state, Afib, LVAD, or bileaflet AVR without risk factors     Current duration of therapy Indefinite/long term therapy   Time in Therapeutic Range (TTR)  (Goal > 60%) 73.3 %       Office visit with referring provider's group within last year yes on 9/3/20       Phoebe Cobian RN

## 2020-09-27 DIAGNOSIS — Z53.9 DIAGNOSIS NOT YET DEFINED: Primary | ICD-10-CM

## 2020-09-27 PROCEDURE — G0179 MD RECERTIFICATION HHA PT: HCPCS | Performed by: INTERNAL MEDICINE

## 2020-09-28 ENCOUNTER — TELEPHONE (OUTPATIENT)
Dept: INTERNAL MEDICINE | Facility: CLINIC | Age: 80
End: 2020-09-28

## 2020-09-30 ENCOUNTER — ANTICOAGULATION THERAPY VISIT (OUTPATIENT)
Dept: INTERNAL MEDICINE | Facility: CLINIC | Age: 80
End: 2020-09-30

## 2020-09-30 DIAGNOSIS — Z79.01 LONG TERM (CURRENT) USE OF ANTICOAGULANTS: ICD-10-CM

## 2020-09-30 DIAGNOSIS — I48.20 CHRONIC ATRIAL FIBRILLATION (H): ICD-10-CM

## 2020-09-30 LAB — INR PPP: 2.8 (ref 0.9–1.1)

## 2020-09-30 NOTE — PROGRESS NOTES
ANTICOAGULATION MANAGEMENT     Patient Name:  Savanna Rehman  Date:  2020    ASSESSMENT /SUBJECTIVE:    Today's INR result of 2.8 is therapeutic. Goal INR of 2.0-3.0      Warfarin dose taken: Warfarin taken as instructed    Diet: No new diet changes affecting INR    Medication changes/ interactions: No new medications/supplements affecting INR    Previous INR: Therapeutic     S/S of bleeding or thromboembolism: No    New injury or illness: No    Upcoming surgery, procedure or cardioversion: Yes: dental work, filing gums today    Additional findings: None      PLAN:    Telephone call with home care nurse Evelin regarding INR result and instructed:     Warfarin Dosing Instructions: Continue your current warfarin dose 2.5 mg on Mon/wed/fri and 3.75 mg all other days    Instructed patient to follow up no later than: 2 weeks  Orders given to  Homecare nurse/facility to recheck    Education provided: None required      Evelin, home care, verbalizes understanding and agrees to warfarin dosing plan.    Instructed to call the Anticoagulation Clinic for any changes, questions or concerns. (#[phone])        Iris Kemp RN      OBJECTIVE:  Recent labs: (last 7 days)     20   INR 2.8*         No question data found.  Anticoagulation Summary  As of 2020    INR goal:   2.0-3.0   TTR:   73.9 % (11.1 mo)   INR used for dosin.8 (2020)   Warfarin maintenance plan:   2.5 mg (2.5 mg x 1) every Mon, Wed, Fri; 3.75 mg (2.5 mg x 1.5) all other days   Full warfarin instructions:   2.5 mg every Mon, Wed, Fri; 3.75 mg all other days   Weekly warfarin total:   22.5 mg   Plan last modified:   Rukhsana Flores RN (9/3/2020)   Next INR check:      Priority:   High   Target end date:   Indefinite    Indications    Permanent atrial fibrillation (H) (Resolved) [I48.21]  Chronic atrial fibrillation (H) [I48.20]  Long term (current) use of anticoagulants [Z79.01]             Anticoagulation Episode Summary      INR check location:       Preferred lab:   EXTERNAL LAB    Send INR reminders to:   NIKKI CHASE    Comments:   Home care       Anticoagulation Care Providers     Provider Role Specialty Phone number    Patti Suárez MD Referring Internal Medicine [phone]

## 2020-10-02 ENCOUNTER — PATIENT OUTREACH (OUTPATIENT)
Dept: UROLOGY | Facility: CLINIC | Age: 80
End: 2020-10-02

## 2020-10-12 ENCOUNTER — NURSE TRIAGE (OUTPATIENT)
Dept: INTERNAL MEDICINE | Facility: CLINIC | Age: 80
End: 2020-10-12

## 2020-10-12 DIAGNOSIS — E11.42 DIABETIC POLYNEUROPATHY ASSOCIATED WITH TYPE 2 DIABETES MELLITUS (H): ICD-10-CM

## 2020-10-12 DIAGNOSIS — R05.9 COUGH: Primary | ICD-10-CM

## 2020-10-12 NOTE — TELEPHONE ENCOUNTER
Call received from patient stating she has had a chest cold for the past week. Was tested for COVID 14 days ago and was negative. States she has been coughing very badly to the point that she thinks she may have pulled a muscle. Denies fever and wheezing. Slight shortness of breath with activity. Patient is coughing up very small amount cream colored mucus. Requesting antibiotic. Advised patient she needs to be seen. Patient states she is too sick to come in for an appointment. Asking for a video visit. Advised will send message to primary care provider but she may advise appointment. Patient states she is not trying to be difficult but she can not physically come in for an appointment. States she does not have a wheel chair, only has a walker. States it would not be safe for her to come for an appointment even if her daughter was able to take time off from work because if she fell her daughter would not be able to get her up.       Additional Information    Negative: Bluish (or gray) lips or face    Negative: Severe difficulty breathing (e.g., struggling for each breath, speaks in single words)    Negative: Rapid onset of cough and has hives    Negative: Coughing started suddenly after medicine, an allergic food or bee sting    Negative: Difficulty breathing after exposure to flames, smoke, or fumes    Negative: Sounds like a life-threatening emergency to the triager    Negative: Previous asthma attacks and this feels like asthma attack    Negative: Chest pain present when not coughing    Negative: Difficulty breathing    Negative: Passed out (i.e., fainted, collapsed and was not responding)    Negative: Patient sounds very sick or weak to the triager    Negative: Coughed up > 1 tablespoon (15 ml) blood (Exception: blood-tinged sputum)    Negative: Fever > 103 F (39.4 C)    Negative: Fever > 101 F (38.3 C) and over 60 years of age    Negative: Fever > 100.0 F (37.8 C) and has diabetes mellitus or a weak immune  "system (e.g., HIV positive, cancer chemotherapy, organ transplant, splenectomy, chronic steroids)    Negative: Fever > 100.0 F (37.8 C) and bedridden (e.g., nursing home patient, stroke, chronic illness, recovering from surgery)    Negative: Increasing ankle swelling    Negative: Wheezing is present    Negative: SEVERE coughing spells (e.g., whooping sound after coughing, vomiting after coughing)    Negative: Coughing up serena-colored (reddish-brown) or blood-tinged sputum    Negative: Fever present > 3 days (72 hours)    Negative: Fever returns after gone for over 24 hours and symptoms worse or not improved    Negative: Using nasal washes and pain medicine > 24 hours and sinus pain persists    Negative: Known COPD or other severe lung disease (i.e., bronchiectasis, cystic fibrosis, lung surgery) and worsening symptoms (i.e., increased sputum purulence or amount, increased breathing difficulty)    Negative: Continuous (nonstop) coughing interferes with work or school and no improvement using cough treatment per Care Advice    Negative: Patient wants to be seen    Negative: Cough has been present for > 3 weeks    Negative: Allergy symptoms are also present (e.g., itchy eyes, clear nasal discharge, postnasal drip)    Negative: Nasal discharge present > 10 days    Negative: Exposure to TB (Tuberculosis)    Negative: Taking an ACE Inhibitor medication (e.g., benazepril/LOTENSIN, captopril/CAPOTEN, enalapril/VASOTEC, lisinopril/ZESTRIL)    Negative: Cough with cold symptoms (e.g., runny nose, postnasal drip, throat clearing)    Negative: Cough with no complications    Answer Assessment - Initial Assessment Questions  1. ONSET: \"When did the cough begin?\"       1 week ago  2. SEVERITY: \"How bad is the cough today?\"       moderate  3. RESPIRATORY DISTRESS: \"Describe your breathing.\"       No wheezing, slight shortness of breath with activity, none at rest  4. FEVER: \"Do you have a fever?\" If so, ask: \"What is your " "temperature, how was it measured, and when did it start?\"      no  5. HEMOPTYSIS: \"Are you coughing up any blood?\" If so ask: \"How much?\" (flecks, streaks, tablespoons, etc.)      no  6. TREATMENT: \"What have you done so far to treat the cough?\" (e.g., meds, fluids, humidifier)      Cough drops  7. CARDIAC HISTORY: \"Do you have any history of heart disease?\" (e.g., heart attack, congestive heart failure)       afib  8. LUNG HISTORY: \"Do you have any history of lung disease?\"  (e.g., pulmonary embolus, asthma, emphysema)      no  9. PE RISK FACTORS: \"Do you have a history of blood clots?\" (or: recent major surgery, recent prolonged travel, bedridden)      no  10. OTHER SYMPTOMS: \"Do you have any other symptoms? (e.g., runny nose, wheezing, chest pain)        no  11. PREGNANCY: \"Is there any chance you are pregnant?\" \"When was your last menstrual period?\"        no  12. TRAVEL: \"Have you traveled out of the country in the last month?\" (e.g., travel history, exposures)        no    Protocols used: COUGH-A-[place]      "

## 2020-10-13 ENCOUNTER — DOCUMENTATION ONLY (OUTPATIENT)
Dept: INTERNAL MEDICINE | Facility: CLINIC | Age: 80
End: 2020-10-13

## 2020-10-13 RX ORDER — AZITHROMYCIN 250 MG/1
TABLET, FILM COATED ORAL
Qty: 6 TABLET | Refills: 0 | Status: SHIPPED | OUTPATIENT
Start: 2020-10-13 | End: 2020-11-30

## 2020-10-13 NOTE — PROGRESS NOTES
ANTICOAGULATION  MANAGEMENT     Interacting Medication Review    Interacting medication(s): Azithromycin (Zithromax, Z-joellen) with warfarin.    Duration: 10/13 to 10/17    New medication?: Yes, interaction may increase INR and risk of bleeding       PLAN     Continue current warfarin dose. Recommend to check INR on 10/14/20    Patient was not contacted    No adjustment to Anticoagulation Calendar was required    Iris Kemp RN

## 2020-10-14 ENCOUNTER — ANTICOAGULATION THERAPY VISIT (OUTPATIENT)
Dept: INTERNAL MEDICINE | Facility: CLINIC | Age: 80
End: 2020-10-14

## 2020-10-14 DIAGNOSIS — Z79.01 LONG TERM (CURRENT) USE OF ANTICOAGULANTS: ICD-10-CM

## 2020-10-14 DIAGNOSIS — I48.20 CHRONIC ATRIAL FIBRILLATION (H): ICD-10-CM

## 2020-10-14 LAB — INR PPP: 3.8 (ref 0.9–1.1)

## 2020-10-14 NOTE — PROGRESS NOTES
ANTICOAGULATION MANAGEMENT     Patient Name:  Savanna Rehman  Date:  10/14/2020    ASSESSMENT /SUBJECTIVE:    Today's INR result of 3.8 is supratherapeutic. Goal INR of 2.0-3.0      Warfarin dose taken: Warfarin taken as instructed    Diet: No new diet changes affecting INR    Medication changes/ interactions: Started z pack yesterday for respiratory illness     Previous INR: Therapeutic     S/S of bleeding or thromboembolism: No    New injury or illness: Respiratory illness x 8-10 days    Upcoming surgery, procedure or cardioversion: No    Additional findings: None      PLAN:    Telephone call with home care nurse Raj regarding INR result and instructed:     Warfarin Dosing Instructions: hold today  then change your warfarin dose to 3.75 mg Sun,T,TH; 2.5 mg ROW    Instructed patient to follow up no later than: 1 week  Orders given to  Homecare nurse/facility to recheck    Education provided: None required      Raj verbalizes understanding and agrees to warfarin dosing plan.    Instructed to call the Anticoagulation Clinic for any changes, questions or concerns. (#[phone])        Phoebe Cobian, CLIFFORD      OBJECTIVE:  Recent labs: (last 7 days)     10/14/20   INR 3.8*         No question data found.  Anticoagulation Summary  As of 10/14/2020    INR goal:  2.0-3.0   TTR:  71.7 % (11.6 mo)   INR used for dosing:  3.8 (10/14/2020)   Warfarin maintenance plan:  3.75 mg (2.5 mg x 1.5) every Sun, Tue, Thu; 2.5 mg (2.5 mg x 1) all other days   Full warfarin instructions:  10/14: Hold; 10/17: 2.5 mg; Otherwise 3.75 mg every Sun, Tue, Thu; 2.5 mg all other days   Weekly warfarin total:  21.25 mg   Plan last modified:  Rukhsana Flores RN (10/14/2020)   Next INR check:  10/21/2020   Priority:  High   Target end date:  Indefinite    Indications    Permanent atrial fibrillation (H) (Resolved) [I48.21]  Chronic atrial fibrillation (H) [I48.20]  Long term (current) use of anticoagulants [Z79.01]              Anticoagulation Episode Summary     INR check location:      Preferred lab:  EXTERNAL LAB    Send INR reminders to:  NIKKI CHASE    Comments:  Home care       Anticoagulation Care Providers     Provider Role Specialty Phone number    Patti Suárez MD Referring Internal Medicine [phone]

## 2020-10-15 NOTE — TELEPHONE ENCOUNTER
Patient called stating cough is not improving and she has no energy. She has a dental appointment on Tuesday that she would really like to keep. Does she need a Covid test because of her symptoms? Call her to advise at [phone] (home)

## 2020-10-16 DIAGNOSIS — N39.0 URINARY TRACT INFECTION: Primary | ICD-10-CM

## 2020-10-16 NOTE — TELEPHONE ENCOUNTER
Pt called back, she had blocked our number because it came up spam risk.  She had negative covid test on 9/28/20.  She states is improving with the z-joellen, has one more day left.  Advised lots of fluids and rest over the weekend and call Monday if not continuing to improve.  States no fever or aches.

## 2020-10-19 ENCOUNTER — TELEPHONE (OUTPATIENT)
Dept: INTERNAL MEDICINE | Facility: CLINIC | Age: 80
End: 2020-10-19

## 2020-10-20 DIAGNOSIS — N39.0 URINARY TRACT INFECTION: ICD-10-CM

## 2020-10-20 LAB
ALBUMIN UR-MCNC: NEGATIVE MG/DL
APPEARANCE UR: ABNORMAL
BACTERIA #/AREA URNS HPF: ABNORMAL /HPF
BILIRUB UR QL STRIP: NEGATIVE
COLOR UR AUTO: YELLOW
GLUCOSE UR STRIP-MCNC: NEGATIVE MG/DL
HGB UR QL STRIP: ABNORMAL
KETONES UR STRIP-MCNC: NEGATIVE MG/DL
LEUKOCYTE ESTERASE UR QL STRIP: ABNORMAL
NITRATE UR QL: POSITIVE
PH UR STRIP: 6 PH (ref 5–7)
RBC #/AREA URNS AUTO: ABNORMAL /HPF
SOURCE: ABNORMAL
SP GR UR STRIP: 1.02 (ref 1–1.03)
UROBILINOGEN UR STRIP-ACNC: 0.2 EU/DL (ref 0.2–1)
WBC #/AREA URNS AUTO: ABNORMAL /HPF

## 2020-10-20 PROCEDURE — 87086 URINE CULTURE/COLONY COUNT: CPT | Performed by: UROLOGY

## 2020-10-20 PROCEDURE — 81001 URINALYSIS AUTO W/SCOPE: CPT | Performed by: UROLOGY

## 2020-10-20 PROCEDURE — 87088 URINE BACTERIA CULTURE: CPT | Performed by: UROLOGY

## 2020-10-20 PROCEDURE — 87186 SC STD MICRODIL/AGAR DIL: CPT | Performed by: UROLOGY

## 2020-10-21 ENCOUNTER — ANTICOAGULATION THERAPY VISIT (OUTPATIENT)
Dept: INTERNAL MEDICINE | Facility: CLINIC | Age: 80
End: 2020-10-21

## 2020-10-21 DIAGNOSIS — I48.20 CHRONIC ATRIAL FIBRILLATION (H): ICD-10-CM

## 2020-10-21 DIAGNOSIS — Z79.01 LONG TERM (CURRENT) USE OF ANTICOAGULANTS: ICD-10-CM

## 2020-10-21 LAB
BACTERIA SPEC CULT: ABNORMAL
INR PPP: 3.6 (ref 0.9–1.1)
Lab: ABNORMAL
SPECIMEN SOURCE: ABNORMAL

## 2020-10-21 NOTE — PROGRESS NOTES
ANTICOAGULATION MANAGEMENT     Patient Name:  Savanna Rehman  Date:  10/21/2020    ASSESSMENT /SUBJECTIVE:    Today's INR result of 3.6 is supratherapeutic. Goal INR of 2.0-3.0      Warfarin dose taken: Warfarin taken as instructed    Diet: No new diet changes affecting INR    Medication changes/ interactions: No new medications/supplements affecting INR    Previous INR: Supratherapeutic     S/S of bleeding or thromboembolism: No    New injury or illness: Yes: Possible UTI    Upcoming surgery, procedure or cardioversion: No    Additional findings: None      PLAN:    Telephone call with home care nurse Raj regarding INR result and instructed:     Warfarin Dosing Instructions: Hold x 1 then 2.5 mg tomorrow; resume normal dosing thereafter    Instructed patient to follow up no later than: 1 week  Orders given to  Homecare nurse/facility to recheck    Education provided: Monitoring for bleeding signs and symptoms and Monitoring for clotting signs and symptoms      Nurse Raj verbalizes understanding and agrees to warfarin dosing plan.    Instructed to call the Anticoagulation Clinic for any changes, questions or concerns. (#[phone])        Azul Whitaker RN      OBJECTIVE:  Recent labs: (last 7 days)     10/21/20   INR 3.6*         No question data found.  Anticoagulation Summary  As of 10/21/2020    INR goal:  2.0-3.0   TTR:  70.3 % (11.8 mo)   INR used for dosing:  3.6 (10/21/2020)   Warfarin maintenance plan:  3.75 mg (2.5 mg x 1.5) every Sun, Tue, Thu; 2.5 mg (2.5 mg x 1) all other days   Full warfarin instructions:  10/21: Hold; 10/22: 2.5 mg; Otherwise 3.75 mg every Sun, Tue, Thu; 2.5 mg all other days   Weekly warfarin total:  21.25 mg   Plan last modified:  Rukhsana Flores RN (10/14/2020)   Next INR check:  10/28/2020   Priority:  High   Target end date:  Indefinite    Indications    Permanent atrial fibrillation (H) (Resolved) [I48.21]  Chronic atrial fibrillation (H) [I48.20]  Long term  (current) use of anticoagulants [Z79.01]             Anticoagulation Episode Summary     INR check location:      Preferred lab:  EXTERNAL LAB    Send INR reminders to:  NIKKI CHASE    Comments:  Home care       Anticoagulation Care Providers     Provider Role Specialty Phone number    Patti Suárez MD Referring Internal Medicine [phone]

## 2020-10-22 DIAGNOSIS — N39.0 RECURRENT UTI: Primary | ICD-10-CM

## 2020-10-22 RX ORDER — CEFPROZIL 250 MG/1
250 TABLET, FILM COATED ORAL 2 TIMES DAILY
Qty: 14 TABLET | Refills: 0 | Status: SHIPPED | OUTPATIENT
Start: 2020-11-01 | End: 2020-11-08

## 2020-10-27 ENCOUNTER — TELEPHONE (OUTPATIENT)
Dept: INTERNAL MEDICINE | Facility: CLINIC | Age: 80
End: 2020-10-27

## 2020-10-28 ENCOUNTER — ANTICOAGULATION THERAPY VISIT (OUTPATIENT)
Dept: INTERNAL MEDICINE | Facility: CLINIC | Age: 80
End: 2020-10-28

## 2020-10-28 DIAGNOSIS — Z79.01 LONG TERM (CURRENT) USE OF ANTICOAGULANTS: ICD-10-CM

## 2020-10-28 DIAGNOSIS — I48.20 CHRONIC ATRIAL FIBRILLATION (H): ICD-10-CM

## 2020-10-28 LAB — INR PPP: 3.4 (ref 0.9–1.1)

## 2020-10-28 NOTE — PROGRESS NOTES
ANTICOAGULATION MANAGEMENT     Patient Name:  Savanna Rehman  Date:  10/28/2020    ASSESSMENT /SUBJECTIVE:    Today's INR result of 3.4 is supratherapeutic. Goal INR of 2.0-3.0      Warfarin dose taken: Warfarin taken as instructed    Diet: No new diet changes affecting INR    Medication changes/ interactions: Patient will be starting Antibiotic on Friday for UTI per Urology (cefprozil)    Previous INR: Supratherapeutic     S/S of bleeding or thromboembolism: No    New injury or illness: Yes: UTI    Upcoming surgery, procedure or cardioversion: Yes: Urology no coumadin hold needed.     Additional findings: None      PLAN:    Telephone call with home care nurse Raj regarding INR result and instructed:     Warfarin Dosing Instructions: Hold x 1 then change your warfarin dose to 2.5 mg daily    Instructed patient to follow up no later than: 1 week  Orders given to  Homecare nurse/facility to recheck    Education provided: Monitoring for bleeding signs and symptoms and Monitoring for clotting signs and symptoms      Nurse Raj verbalizes understanding and agrees to warfarin dosing plan.    Instructed to call the Anticoagulation Clinic for any changes, questions or concerns. (#[phone])        Azul Whitaker, CLIFFORD      OBJECTIVE:  Recent labs: (last 7 days)     10/28/20   INR 3.4*         No question data found.  Anticoagulation Summary  As of 10/28/2020    INR goal:  2.0-3.0   TTR:  68.9 % (1 y)   INR used for dosing:  3.4 (10/28/2020)   Warfarin maintenance plan:  2.5 mg (2.5 mg x 1) every day   Full warfarin instructions:  10/28: Hold; Otherwise 2.5 mg every day   Weekly warfarin total:  17.5 mg   Plan last modified:  Azul Whitaker RN (10/28/2020)   Next INR check:  11/4/2020   Priority:  High   Target end date:  Indefinite    Indications    Permanent atrial fibrillation (H) (Resolved) [I48.21]  Chronic atrial fibrillation (H) [I48.20]  Long term (current) use of anticoagulants [Z79.01]              Anticoagulation Episode Summary     INR check location:      Preferred lab:  EXTERNAL LAB    Send INR reminders to:  NIKKI CHASE    Comments:  Home care       Anticoagulation Care Providers     Provider Role Specialty Phone number    Patti Suárez MD Referring Internal Medicine [phone]

## 2020-11-03 ENCOUNTER — PRE VISIT (OUTPATIENT)
Dept: UROLOGY | Facility: CLINIC | Age: 80
End: 2020-11-03

## 2020-11-03 ENCOUNTER — TELEPHONE (OUTPATIENT)
Dept: SLEEP MEDICINE | Facility: CLINIC | Age: 80
End: 2020-11-03

## 2020-11-03 NOTE — TELEPHONE ENCOUNTER
Spoke to the patient and was told that she has been trying for some time to get the oxygen orders that she said were to be sent months ago. Per chart review she had a MC and it was returned but it does not have the results in the chart. Patient is wanting orders today as she received a letter that she is going to be losing her oxygen.     Bella Mukherjee [facility] [place] ~[place]

## 2020-11-03 NOTE — TELEPHONE ENCOUNTER
Spoke to Sameer at [facility] and was told that the patient does not need an order because they received a CMN from the provider in September. Patient is good to keep oxygen.     Called patient and let her know that this is what happened and she said she was so glad that it all worked out and she got to keep her oxygen. Patient had no other questions.    Bella Mukherjee [facility] [place] ~[place]      NO NEW ORDERS NEEDED AT THIS TIME.

## 2020-11-03 NOTE — TELEPHONE ENCOUNTER
Reason for visit: Discuss UDS resulst     Relevant information: rUT    Records/imaging/labs/orders: no UDS results    Pt called: message sent to scheduling team to reschedule both appointments    At Rooming: standard

## 2020-11-03 NOTE — TELEPHONE ENCOUNTER
Patient called today.    Patient has left multiple messages for Dr. Castillo's care team and she is upset as she hasn't heard back yet.    Can you please contact her today?    Needs new oxygen medication.    Thank you.    Central Scheduling  Beatrice REES

## 2020-11-04 ENCOUNTER — ANTICOAGULATION THERAPY VISIT (OUTPATIENT)
Dept: INTERNAL MEDICINE | Facility: CLINIC | Age: 80
End: 2020-11-04

## 2020-11-04 DIAGNOSIS — Z79.01 LONG TERM (CURRENT) USE OF ANTICOAGULANTS: ICD-10-CM

## 2020-11-04 DIAGNOSIS — I48.20 CHRONIC ATRIAL FIBRILLATION (H): ICD-10-CM

## 2020-11-04 LAB — INR PPP: 2.5 (ref 0.9–1.1)

## 2020-11-04 NOTE — TELEPHONE ENCOUNTER
Patient called to say she is unable to come to her appointment with Dr. Wilson and BING Mendoza due to Covid cases are on the rise and it is unsafe for her to come out right now and will look into January to be rescheduled possibly.  Will call the patient in January to discuss Covid cases at that time    Adelaida Liu RN   Care Coordinator Urology

## 2020-11-04 NOTE — PROGRESS NOTES
ANTICOAGULATION MANAGEMENT     Patient Name:  Savanna Rehman  Date:  2020    ASSESSMENT /SUBJECTIVE:    Today's INR result of 2.5 is therapeutic. Goal INR of 2.0-3.0      Warfarin dose taken: Warfarin taken as instructed    Diet: No new diet changes affecting INR    Medication changes/ interactions: Cefprozil    Previous INR: Supratherapeutic     S/S of bleeding or thromboembolism: No    New injury or illness: No    Upcoming surgery, procedure or cardioversion: No    Additional findings: Antibiotic for 3 more days from urology      PLAN:    Telephone call with home care nurse Raj regarding INR result and instructed:     Warfarin Dosing Instructions: Continue your current warfarin dose 2.5 mg daily    Instructed patient to follow up no later than: 1 week  Orders given to  Homecare nurse/facility to recheck    Education provided: Monitoring for bleeding signs and symptoms and Monitoring for clotting signs and symptoms      Savanna verbalizes understanding and agrees to warfarin dosing plan.    Instructed to call the Anticoagulation Clinic for any changes, questions or concerns. (#[phone])        Azul Whitaker RN      OBJECTIVE:  Recent labs: (last 7 days)     20   INR 2.5*         No question data found.  Anticoagulation Summary  As of 2020    INR goal:  2.0-3.0   TTR:  68.4 % (1 y)   INR used for dosin.5 (2020)   Warfarin maintenance plan:  2.5 mg (2.5 mg x 1) every day   Full warfarin instructions:  2.5 mg every day   Weekly warfarin total:  17.5 mg   No change documented:  Azul Whitaker RN   Plan last modified:  Azul Whitaker RN (10/28/2020)   Next INR check:  2020   Priority:  High   Target end date:  Indefinite    Indications    Permanent atrial fibrillation (H) (Resolved) [I48.21]  Chronic atrial fibrillation (H) [I48.20]  Long term (current) use of anticoagulants [Z79.01]             Anticoagulation Episode Summary     INR check location:      Preferred lab:   EXTERNAL LAB    Send INR reminders to:  NIKKI CHASE    Comments:  Home care       Anticoagulation Care Providers     Provider Role Specialty Phone number    Patti Suárez MD Referring Internal Medicine [phone]

## 2020-11-06 ENCOUNTER — TELEPHONE (OUTPATIENT)
Dept: INTERNAL MEDICINE | Facility: CLINIC | Age: 80
End: 2020-11-06

## 2020-11-09 DIAGNOSIS — N39.0 RECURRENT UTI: Primary | ICD-10-CM

## 2020-11-11 ENCOUNTER — ANTICOAGULATION THERAPY VISIT (OUTPATIENT)
Dept: INTERNAL MEDICINE | Facility: CLINIC | Age: 80
End: 2020-11-11

## 2020-11-11 DIAGNOSIS — Z79.01 LONG TERM (CURRENT) USE OF ANTICOAGULANTS: ICD-10-CM

## 2020-11-11 DIAGNOSIS — I48.20 CHRONIC ATRIAL FIBRILLATION (H): ICD-10-CM

## 2020-11-11 LAB — INR PPP: 2.7 (ref 0.9–1.1)

## 2020-11-11 NOTE — PROGRESS NOTES
ANTICOAGULATION MANAGEMENT     Patient Name:  Savanna Rehman  Date:  2020    ASSESSMENT /SUBJECTIVE:    Today's INR result of 2.7 is therapeutic. Goal INR of 2.0-3.0      Warfarin dose taken: Warfarin taken as instructed    Diet: No new diet changes affecting INR    Medication changes/ interactions: No new medications/supplements affecting INR    Previous INR: Therapeutic     S/S of bleeding or thromboembolism: No    New injury or illness: No    Upcoming surgery, procedure or cardioversion: No    Additional findings: None      PLAN:    Telephone call with home care nurse Fannie regarding INR result and instructed:     Warfarin Dosing Instructions: Continue your current warfarin dose 2.5 mg daily    Instructed patient to follow up no later than: 1 week  Orders given to  Homecare nurse/facility to recheck    Education provided: None required      Fannie, home care, verbalizes understanding and agrees to warfarin dosing plan.    Instructed to call the Anticoagulation Clinic for any changes, questions or concerns. (#[phone])        Iris Kemp RN      OBJECTIVE:  Recent labs: (last 7 days)     20   INR 2.7*         No question data found.  Anticoagulation Summary  As of 2020    INR goal:  2.0-3.0   TTR:  68.4 % (1 y)   INR used for dosin.7 (2020)   Warfarin maintenance plan:  2.5 mg (2.5 mg x 1) every day   Full warfarin instructions:  2.5 mg every day   Weekly warfarin total:  17.5 mg   Plan last modified:  Azul Whitaker RN (10/28/2020)   Next INR check:  2020   Priority:  Maintenance   Target end date:  Indefinite    Indications    Permanent atrial fibrillation (H) (Resolved) [I48.21]  Chronic atrial fibrillation (H) [I48.20]  Long term (current) use of anticoagulants [Z79.01]             Anticoagulation Episode Summary     INR check location:      Preferred lab:  EXTERNAL LAB    Send INR reminders to:  NIKKI CHASE    Comments:  Home care       Anticoagulation Care  Providers     Provider Role Specialty Phone number    Patti Suárez MD Referring Internal Medicine [phone]

## 2020-11-18 ENCOUNTER — ANTICOAGULATION THERAPY VISIT (OUTPATIENT)
Dept: INTERNAL MEDICINE | Facility: CLINIC | Age: 80
End: 2020-11-18

## 2020-11-18 DIAGNOSIS — Z79.01 LONG TERM (CURRENT) USE OF ANTICOAGULANTS: ICD-10-CM

## 2020-11-18 DIAGNOSIS — I48.20 CHRONIC ATRIAL FIBRILLATION (H): ICD-10-CM

## 2020-11-18 LAB — INR PPP: 2.4 (ref 0.9–1.1)

## 2020-11-18 NOTE — PROGRESS NOTES
ANTICOAGULATION MANAGEMENT     Patient Name:  Savanna Rehman  Date:  2020    ASSESSMENT /SUBJECTIVE:    Today's INR result of 2.4 is therapeutic. Goal INR of 2.0-3.0      Warfarin dose taken: Warfarin taken as instructed    Diet: No new diet changes affecting INR    Medication changes/ interactions: No new medications/supplements affecting INR    Previous INR: Therapeutic     S/S of bleeding or thromboembolism: No    New injury or illness: No    Upcoming surgery, procedure or cardioversion: No    Additional findings: home care rn will be off next week - asked to recheck in 2 weeks.       PLAN:    Telephone call with home care nurse Siena with [facility]  regarding INR result and instructed:     Warfarin Dosing Instructions: Continue your current warfarin dose 2.5mg dialy     Instructed patient to follow up no later than: 2 weeks  Orders given to  Homecare nurse/facility to recheck    Education provided: Contact the anticoagulation clinic with any changes, questions or concerns at #[phone]       Siena with [facility]  verbalizes understanding and agrees to warfarin dosing plan.    Instructed to call the Anticoagulation Clinic for any changes, questions or concerns. (#[phone])        Sanjana Fox RN      OBJECTIVE:  Recent labs: (last 7 days)     20   INR 2.4*         No question data found.  Anticoagulation Summary  As of 2020    INR goal:  2.0-3.0   TTR:  68.4 % (1 y)   INR used for dosin.4 (2020)   Warfarin maintenance plan:  2.5 mg (2.5 mg x 1) every day   Full warfarin instructions:  2.5 mg every day   Weekly warfarin total:  17.5 mg   No change documented:  Sanjana Fox RN   Plan last modified:  Azul Whitaker RN (10/28/2020)   Next INR check:  2020   Priority:  Maintenance   Target end date:  Indefinite    Indications    Permanent atrial fibrillation (H) (Resolved) [I48.21]  Chronic atrial fibrillation (H) [I48.20]  Long term (current) use of anticoagulants  [Z79.01]             Anticoagulation Episode Summary     INR check location:      Preferred lab:  EXTERNAL LAB    Send INR reminders to:  NIKKI CHASE    Comments:  Home care       Anticoagulation Care Providers     Provider Role Specialty Phone number    Patti Suárez MD Referring Internal Medicine [phone]         Sanjana Green RN, BSN, PHN

## 2020-11-20 ENCOUNTER — TELEPHONE (OUTPATIENT)
Dept: UROLOGY | Facility: CLINIC | Age: 80
End: 2020-11-20

## 2020-11-20 DIAGNOSIS — N39.0 RECURRENT UTI: ICD-10-CM

## 2020-11-20 DIAGNOSIS — N39.0 RECURRENT UTI: Primary | ICD-10-CM

## 2020-11-20 PROCEDURE — 87186 SC STD MICRODIL/AGAR DIL: CPT | Performed by: UROLOGY

## 2020-11-20 PROCEDURE — 87086 URINE CULTURE/COLONY COUNT: CPT | Performed by: UROLOGY

## 2020-11-20 PROCEDURE — 87088 URINE BACTERIA CULTURE: CPT | Performed by: UROLOGY

## 2020-11-20 RX ORDER — CEPHALEXIN 500 MG/1
500 CAPSULE ORAL 2 TIMES DAILY
Qty: 10 CAPSULE | Refills: 0 | Status: SHIPPED | OUTPATIENT
Start: 2020-11-20 | End: 2020-11-30

## 2020-11-20 NOTE — TELEPHONE ENCOUNTER
[facility] Call Center    Phone Message    May a detailed message be left on voicemail: yes     Reason for Call: Other: Pt requesting a call from a nurse to discuss her pain symptoms due to a UTI. She reports she is in horrible pain and would love a call back before the weekend if possible. Pt had a urine culture done today as well. Thank you    Action Taken: Message routed to:  [facility] ([facility] Uro    Travel Screening: Not Applicable

## 2020-11-20 NOTE — TELEPHONE ENCOUNTER
November 20, 2020    Received message that patient was in a lot of pain.  Called her.  Prior to discussion she verified her name and date of birth.    She called because she is having some low back pain and stomach cramps.  She has some intermittent urine stream    Denies fevers, chills, nausea, vomiting.    She gave a urine sample this morning and say that the urine is very cloudy.      She is also reporting constipation.      She states that she is not feeling well but not bad enough to go anywhere to be seen.    Empiric cephalexin sent to her pharmacy, recommend bowel regimen    Discussed that is symptoms are worsening or anything else concerning she needs to go to the ED.  She expressed understanding of the plan    Deisy Wilson MD MPH   of Urology

## 2020-11-22 LAB
BACTERIA SPEC CULT: ABNORMAL
Lab: ABNORMAL
SPECIMEN SOURCE: ABNORMAL

## 2020-11-23 DIAGNOSIS — N39.0 RECURRENT UTI: Primary | ICD-10-CM

## 2020-11-23 DIAGNOSIS — R19.7 DIARRHEA: ICD-10-CM

## 2020-11-27 ENCOUNTER — TELEPHONE (OUTPATIENT)
Dept: INTERNAL MEDICINE | Facility: CLINIC | Age: 80
End: 2020-11-27

## 2020-11-27 NOTE — TELEPHONE ENCOUNTER
Fax received from [facility] 11/27/20 for review and signature.  Put in Dr. Lauren's in basket.

## 2020-11-30 ENCOUNTER — HOSPITAL ENCOUNTER (OUTPATIENT)
Dept: MAMMOGRAPHY | Facility: CLINIC | Age: 80
Discharge: HOME | End: 2020-11-30
Attending: INTERNAL MEDICINE | Admitting: INTERNAL MEDICINE
Payer: COMMERCIAL

## 2020-11-30 ENCOUNTER — OFFICE VISIT (OUTPATIENT)
Dept: INTERNAL MEDICINE | Facility: CLINIC | Age: 80
End: 2020-11-30
Payer: COMMERCIAL

## 2020-11-30 VITALS
HEIGHT: 68 IN | WEIGHT: 258 LBS | OXYGEN SATURATION: 96 % | RESPIRATION RATE: 18 BRPM | DIASTOLIC BLOOD PRESSURE: 74 MMHG | SYSTOLIC BLOOD PRESSURE: 136 MMHG | HEART RATE: 68 BPM | BODY MASS INDEX: 39.1 KG/M2

## 2020-11-30 DIAGNOSIS — Z12.31 VISIT FOR SCREENING MAMMOGRAM: ICD-10-CM

## 2020-11-30 DIAGNOSIS — K21.9 GASTROESOPHAGEAL REFLUX DISEASE WITHOUT ESOPHAGITIS: ICD-10-CM

## 2020-11-30 DIAGNOSIS — E55.9 VITAMIN D DEFICIENCY: ICD-10-CM

## 2020-11-30 DIAGNOSIS — E78.5 HYPERLIPIDEMIA LDL GOAL <100: ICD-10-CM

## 2020-11-30 DIAGNOSIS — I48.0 PAROXYSMAL ATRIAL FIBRILLATION (H): ICD-10-CM

## 2020-11-30 DIAGNOSIS — E11.42 DIABETIC POLYNEUROPATHY ASSOCIATED WITH TYPE 2 DIABETES MELLITUS (H): ICD-10-CM

## 2020-11-30 DIAGNOSIS — Z00.00 ROUTINE GENERAL MEDICAL EXAMINATION AT A HEALTH CARE FACILITY: Primary | ICD-10-CM

## 2020-11-30 PROCEDURE — 99397 PER PM REEVAL EST PAT 65+ YR: CPT | Performed by: INTERNAL MEDICINE

## 2020-11-30 PROCEDURE — 99207 PR FOOT EXAM NO CHARGE: CPT | Mod: 25 | Performed by: INTERNAL MEDICINE

## 2020-11-30 PROCEDURE — 77063 BREAST TOMOSYNTHESIS BI: CPT

## 2020-11-30 PROCEDURE — 99214 OFFICE O/P EST MOD 30 MIN: CPT | Mod: 25 | Performed by: INTERNAL MEDICINE

## 2020-11-30 RX ORDER — GLIPIZIDE 2.5 MG/1
2.5 TABLET, EXTENDED RELEASE ORAL DAILY
Qty: 90 TABLET | Refills: 1 | Status: SHIPPED | OUTPATIENT
Start: 2020-11-30 | End: 2021-07-11

## 2020-11-30 ASSESSMENT — ENCOUNTER SYMPTOMS
HEARTBURN: 1
ABDOMINAL PAIN: 1
EYE PAIN: 1
WEAKNESS: 1
BREAST MASS: 0
DIZZINESS: 1

## 2020-11-30 ASSESSMENT — ACTIVITIES OF DAILY LIVING (ADL)
CURRENT_FUNCTION: HOUSEWORK REQUIRES ASSISTANCE
CURRENT_FUNCTION: LAUNDRY REQUIRES ASSISTANCE
CURRENT_FUNCTION: SHOPPING REQUIRES ASSISTANCE
CURRENT_FUNCTION: TRANSPORTATION REQUIRES ASSISTANCE

## 2020-11-30 ASSESSMENT — PATIENT HEALTH QUESTIONNAIRE - PHQ9
10. IF YOU CHECKED OFF ANY PROBLEMS, HOW DIFFICULT HAVE THESE PROBLEMS MADE IT FOR YOU TO DO YOUR WORK, TAKE CARE OF THINGS AT HOME, OR GET ALONG WITH OTHER PEOPLE: SOMEWHAT DIFFICULT
SUM OF ALL RESPONSES TO PHQ QUESTIONS 1-9: 10
SUM OF ALL RESPONSES TO PHQ QUESTIONS 1-9: 10

## 2020-11-30 ASSESSMENT — MIFFLIN-ST. JEOR: SCORE: 1698.78

## 2020-11-30 NOTE — PROGRESS NOTES
Dr Lauren's note    Patient's instructions / PLAN:                                                        Plan:  1. Continue same meds, same doses for now   2. Please make a lab appointment for fasting labs in March   3. Please make an appointment few days after the labs to discuss about the results.   4.  The following vaccines are recommended for you. Please check with your insurance about coverage.  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  (H)  Comment: Controlled    Plan: glipiZIDE (GLUCOTROL XL) 2.5 MG 24 hr tablet,         CBC with platelets, Comprehensive metabolic         panel, Hemoglobin A1c, Lipid panel reflex to         direct LDL Fasting, Albumin Random Urine         Quantitative with Creat Ratio, TSH with free T4        reflex            (I48.0) Paroxysmal atrial fibrillation (H)  Comment:   Plan: Comprehensive metabolic panel, TSH with free T4        reflex        Cont warfarin    (E78.5) Hyperlipidemia LDL goal <100  Comment: Controlled    Plan: Comprehensive metabolic panel, Lipid panel         reflex to direct LDL Fasting            (E55.9) Vitamin D deficiency  Comment:   Plan: Vitamin D Deficiency            (K21.9) Gastroesophageal reflux disease without esophagitis  Comment:   Plan:        Chief Complaint:                                                        Annual exam  Follow up chronic medical problems      SUBJECTIVE:             "                                        History of present illness     We reviewed the chronic medical problems as above.   I reviewed the recent tests results in Epic     Memory deficit  -- she noticed it this summer after anesthesia for cystoscopy  -- she noticed that if she is interrupted in the middle of the sentence she doesn't remember what she was trying to say.    -- we discussed about the meds for anxiety and depression. She doesn't want because she already has so many allergies and she likes to enjoy a cocktail once a week.     Easy bruising  -- on Coumadin     A1c -- 6.4  --     She marked dizziness and heartburn  She has his of hiatal hernia surgery. Heartburn for the last year and takes OTC meds ( not known the name). She doesn't want prilosec because \"it causes cancer \"  She has lower abd pain because she thinks she has a bladder infection     She is interested in shingrix, but insurance doesn't cover it       Not wearing the cpap  ROS:     See below       PMHx: - reviewed  Past Medical History:   Diagnosis Date     Anemia     Iron Deficiency anemia     Atrial fibrillation (H)      CAD (coronary artery disease)     non-obstructive     Chronic pain     neck, low back, legs     Congestive heart failure (H)      Degenerative disk disease      Fibromyalgia      Gastro-oesophageal reflux disease      Gout      Hiatal hernia      Mumps      Neuropathy      Palpitations      Pernicious anemia      Sleep apnea     uses CPAP.     Urinary incontinence      Vitamin D deficiency        PSHx: reviewed  Past Surgical History:   Procedure Laterality Date     APPENDECTOMY       CHOLECYSTECTOMY       COLONOSCOPY  3/15/2011     CORONARY ANGIOGRAPHY ADULT ORDER       CYSTOSCOPY, BIOPSY BLADDER, COMBINED N/A 7/13/2020    Procedure: CYSTOSCOPY, WITH BLADDER BIOPSY;  Surgeon: Deisy Wilson MD;  Location: [facility] OR     HEART CATH LEFT HEART CATH  12/30/16    medication management     HYSTERECTOMY TOTAL ABDOMINAL       " Knee replacement NOS Left      LAPAROSCOPIC NISSEN FUNDOPLICATION N/A 2015    Procedure: LAPAROSCOPIC NISSEN FUNDOPLICATION;  Surgeon: Armando Ansari MD;  Location: [facility] OR     TONSILLECTOMY       TRANSPOSITION ULNAR NERVE (ELBOW)          Soc Hx: No daily alcohol, no smoking  Social History     Socioeconomic History     Marital status:      Spouse name: Not on file     Number of children: Not on file     Years of education: Not on file     Highest education level: Not on file   Occupational History     Not on file   Social Needs     Financial resource strain: Not on file     Food insecurity     Worry: Not on file     Inability: Not on file     Transportation needs     Medical: Not on file     Non-medical: Not on file   Tobacco Use     Smoking status: Former Smoker     Packs/day: 0.50     Years: 50.00     Pack years: 25.00     Types: Cigarettes     Quit date: 2014     Years since quittin.2     Smokeless tobacco: Never Used   Substance and Sexual Activity     Alcohol use: Yes     Frequency: Monthly or less     Comment: socially     Drug use: Never     Sexual activity: Not Currently   Lifestyle     Physical activity     Days per week: Not on file     Minutes per session: Not on file     Stress: Not on file   Relationships     Social connections     Talks on phone: Not on file     Gets together: Not on file     Attends Synagogue service: Not on file     Active member of club or organization: Not on file     Attends meetings of clubs or organizations: Not on file     Relationship status: Not on file     Intimate partner violence     Fear of current or ex partner: Not on file     Emotionally abused: Not on file     Physically abused: Not on file     Forced sexual activity: Not on file   Other Topics Concern     Parent/sibling w/ CABG, MI or angioplasty before 65F 55M? Not Asked   Social History Narrative     Not on file        Fam Hx: reviewed  Family History   Problem Relation Age of Onset      Alzheimer Disease Mother      Lung Cancer Father      No Known Problems Brother      No Known Problems Brother      No Known Problems Brother      Unknown/Adopted No family hx of          Screening: reviewed      All: reviewed    Meds: reviewed  Current Outpatient Medications   Medication Sig Dispense Refill     ACE/ARB/ARNI NOT PRESCRIBED (INTENTIONAL) Please choose reason not prescribed, below       alcohol swab prep pads Use to swab area of injection/chandrakant as directed. 100 each 3     B Complex-C (SUPER B COMPLEX PO) Take 1 tablet by mouth At Bedtime       balsalazide (COLAZAL) 750 MG capsule Take 2,250 mg by mouth 3 times daily       BETA BLOCKER NOT PRESCRIBED (INTENTIONAL) Beta Blocker not prescribed intentionally due to Hypotension (SBP < 90)       Biotin 5000 MCG TABS Take 5,000 mcg by mouth At Bedtime       blood glucose (NO BRAND SPECIFIED) test strip Use to test blood sugar 1 time daily. To accompany: Blood Glucose Monitor Brands: Contour Next. 100 strip 1     blood glucose calibration (NO BRAND SPECIFIED) solution To accompany: Blood Glucose Monitor Brands: per insurance. 1 Bottle 3     blood glucose monitoring (NO BRAND SPECIFIED) meter device kit Use to test blood sugar 1 times daily. Preferred blood glucose meter OR supplies to accompany: Blood Glucose Monitor Brands: per insurance. 1 kit 0     butalbital-aspirin-caffeine (FIORINAL) -40 MG capsule Take 1 capsule by mouth every 6 hours as needed for headaches 30 capsule 0     cholecalciferol (VITAMIN D3) 5000 units (125 mcg) capsule Take 5,000 Units by mouth At Bedtime       glipiZIDE (GLUCOTROL XL) 2.5 MG 24 hr tablet TAKE 1 TABLET DAILY 90 tablet 0     nystatin (MYCOSTATIN) 881924 UNIT/GM external powder Apply to bilateral groin area 2x daily as needed. 30 g 1     order for DME Oxygen 2 Li/min  at night bleed with CPAP       oxybutynin (DITROPAN) 5 MG tablet TAKE 1 TABLET TWICE A DAY 90 tablet 1     ranitidine (ZANTAC) 150 MG tablet Take 150 mg  "by mouth At Bedtime       thin (NO BRAND SPECIFIED) lancets Use with lanceting device to check glucose once a day. To accompany: Blood Glucose Monitor Brands: per insurance. 100 each 3     warfarin ANTICOAGULANT (COUMADIN) 2.5 MG tablet TAKE AS INSTRUCTED BY YOUR PRESCRIBER 126 tablet 3       OBJECTIVE:                                                    Physical Exam :      Blood pressure 136/74, pulse 68, resp. rate 18, height 1.727 m (5' 8\"), weight 117 kg (258 lb), SpO2 96 %, not currently breastfeeding.     NAD, appears comfortable  Skin clear, no rashes    Neck: supple, no JVD,  no thyroidmegaly  Lymph nodes non palpable in the cervical, supraclavicular axillaries,   Chest: clear to auscultation with good respiratory effort  Cardiac: S1S2, RRR, no mgr appreciated  Abdomen: soft, not tender, not distended, audible bowel sound, no hepatosplenomegaly, no palpable masses, no abdominal bruits  Extremities: no cyanosis, clubbing or edema. Good pulse, dry skin, absent monofilament  Neuro: A, Ox3, no focal signs.  Breast exam in supine and erect position: they are symmetrical, no skin changes, no tenderness or nodes on palpation. Nipples are erect, no skin lesions, no discharge on pressure.  Pelvic exam: Normal external genitals, normal appearing perineum, normal appearing urethra,  vaginal mucosa pink, no discharge, Cervix appears normal, Pap smear obtained. On bimanual exam, I did not feel any uterus or ovarian masses, and she denies any tenderness.         Patti Lauren MD  Internal Medicine       SUBJECTIVE:   Savanna Rehman is a 80 year old female who presents for Preventive Visit.      Patient has been advised of split billing requirements and indicates understanding: Yes   Are you in the first 12 months of your Medicare coverage?  No    Healthy Habits:     In general, how would you rate your overall health?  Fair    Frequency of exercise:  None    Do you usually eat at least 4 servings of fruit and vegetables " "a day, include whole grains    & fiber and avoid regularly eating high fat or \"junk\" foods?  No    Taking medications regularly:  Yes    Medication side effects:  Other    Ability to successfully perform activities of daily living:  Transportation requires assistance, shopping requires assistance, housework requires assistance and laundry requires assistance    Home Safety:  No safety concerns identified    Hearing Impairment:  No hearing concerns    In the past 6 months, have you been bothered by leaking of urine? Yes    In general, how would you rate your overall mental or emotional health?  Fair      PHQ-2 Total Score: 4    Additional concerns today:  Yes    Do you feel safe in your environment? Yes    Have you ever done Advance Care Planning? (For example, a Health Directive, POLST, or a discussion with a medical provider or your loved ones about your wishes): No, advance care planning information given to patient to review.  Advanced care planning was discussed at today's visit.      Fall risk       Fallen two or more times in the last year   No  Any fall with an injury in the last year  No    Cognitive Screening   1) Repeat 3 items (Leader, Season, Table)    2) Clock draw: ABNORMAL   3) 3 item recall: Recalls 3 objects  Results: 3 items recalled: COGNITIVE IMPAIRMENT LESS LIKELY    Mini-CogTM Copyright S Tashi. Licensed by the author for use in [facility]; reprinted with permission (soob@[facility]). All rights reserved.      Do you have sleep apnea, excessive snoring or daytime drowsiness?: yes    Reviewed and updated as needed this visit by clinical staff  Tobacco  Allergies               Reviewed and updated as needed this visit by Provider                Social History     Tobacco Use     Smoking status: Former Smoker     Packs/day: 0.50     Years: 50.00     Pack years: 25.00     Types: Cigarettes     Quit date: 2014     Years since quittin.2     Smokeless tobacco: Never Used "   Substance Use Topics     Alcohol use: Yes     Frequency: Monthly or less     Comment: socially         Alcohol Use 11/30/2020   Prescreen: >3 drinks/day or >7 drinks/week? No   Prescreen: >3 drinks/day or >7 drinks/week? -           Diabetes Follow-up    How often are you checking your blood sugar? A few times a week  What time of day are you checking your blood sugars (select all that apply)?  Before meals  Have you had any blood sugars above 200?  No  Have you had any blood sugars below 70?  No    What symptoms do you notice when your blood sugar is low?  None    What concerns do you have today about your diabetes? None     Do you have any of these symptoms? (Select all that apply)  No numbness or tingling in feet.  No redness, sores or blisters on feet.  No complaints of excessive thirst.  No reports of blurry vision.  No significant changes to weight.    Have you had a diabetic eye exam in the last 12 months? No        BP Readings from Last 2 Encounters:   09/03/20 112/72   07/13/20 130/82     Hemoglobin A1C (%)   Date Value   09/14/2020 6.4 (H)   03/04/2020 6.6 (H)     LDL Cholesterol Calculated (mg/dL)   Date Value   03/04/2020 111 (H)   11/16/2019 113 (H)               Hyperlipidemia Follow-Up      Are you regularly taking any medication or supplement to lower your cholesterol?   No    Are you having muscle aches or other side effects that you think could be caused by your cholesterol lowering medication?  No      Current providers sharing in care for this patient include:   Patient Care Team:  Patti Suárez MD as PCP - General (Internal Medicine)  Patti Suárez MD as Assigned PCP  Patti Suárez MD as Referring Physician (Internal Medicine)  Deisy Wilson MD as MD (Urology)  Adelaida Liu, RN as Specialty Care Coordinator (Urology)  Deisy Wilson MD as Assigned Surgical Provider  Narendra Castillo MD as Assigned Sleep Provider  Damian  "aDvid Lindquist MD as Assigned Heart and Vascular Provider    The following health maintenance items are reviewed in Epic and correct as of today:  Health Maintenance   Topic Date Due     HF ACTION PLAN  1940     ADVANCE CARE PLANNING  1940     HEPATITIS C SCREENING  11/14/1960     ZOSTER IMMUNIZATION (1 of 2) 11/14/1992     EYE EXAM  02/01/2018     DIABETIC FOOT EXAM  11/19/2019     MEDICARE ANNUAL WELLNESS VISIT  10/18/2020     FALL RISK ASSESSMENT  10/18/2020     MICROALBUMIN  11/16/2020     ALT  03/04/2021     LIPID  03/04/2021     A1C  03/14/2021     BMP  03/14/2021     CBC  09/14/2021     DTAP/TDAP/TD IMMUNIZATION (2 - Td) 04/15/2026     TSH W/FREE T4 REFLEX  Completed     PHQ-2  Completed     INFLUENZA VACCINE  Completed     Pneumococcal Vaccine: 65+ Years  Completed     Pneumococcal Vaccine: Pediatrics (0 to 5 Years) and At-Risk Patients (6 to 64 Years)  Aged Out     IPV IMMUNIZATION  Aged Out     MENINGITIS IMMUNIZATION  Aged Out     Labs reviewed in EPIC      Review of Systems   Eyes: Positive for pain and visual disturbance.   Gastrointestinal: Positive for abdominal pain and heartburn.   Breasts:  Negative for tenderness, breast mass and discharge.   Genitourinary: Negative for pelvic pain and vaginal bleeding.   Neurological: Positive for dizziness and weakness.       Patient has been advised of split billing requirements and indicates understanding: Yes At the check in, at the    COUNSELING:  Reviewed preventive health counseling, as reflected in patient instructions       Regular exercise       Healthy diet/nutrition    Estimated body mass index is 39.23 kg/m  as calculated from the following:    Height as of this encounter: 1.727 m (5' 8\").    Weight as of this encounter: 117 kg (258 lb).    Weight management plan: Discussed healthy diet and exercise guidelines    She reports that she quit smoking about 6 years ago. Her smoking use included cigarettes. She has a 25.00 pack-year " smoking history. She has never used smokeless tobacco.      Appropriate preventive services were discussed with this patient, including applicable screening as appropriate for cardiovascular disease, diabetes, osteopenia/osteoporosis, and glaucoma.  As appropriate for age/gender, discussed screening for colorectal cancer, prostate cancer, breast cancer, and cervical cancer. Checklist reviewing preventive services available has been given to the patient.    Reviewed patients plan of care and provided an AVS. The Basic Care Plan (routine screening as documented in Health Maintenance) for Savanna meets the Care Plan requirement. This Care Plan has been established and reviewed with the Patient.    Counseling Resources:  ATP IV Guidelines  Pooled Cohorts Equation Calculator  Breast Cancer Risk Calculator  Breast Cancer: Medication to Reduce Risk  FRAX Risk Assessment  ICSI Preventive Guidelines  Dietary Guidelines for Americans, 2010  CAXA's MyPlate  ASA Prophylaxis  Lung CA Screening    Patti Suárez MD  [facility]    Identified Health Risks:  Answers for HPI/ROS submitted by the patient on 11/30/2020   Annual Exam:  If you checked off any problems, how difficult have these problems made it for you to do your work, take care of things at home, or get along with other people?: Somewhat difficult  PHQ9 TOTAL SCORE: 10

## 2020-12-01 ENCOUNTER — NURSE TRIAGE (OUTPATIENT)
Dept: INTERNAL MEDICINE | Facility: CLINIC | Age: 80
End: 2020-12-01

## 2020-12-01 ASSESSMENT — PATIENT HEALTH QUESTIONNAIRE - PHQ9: SUM OF ALL RESPONSES TO PHQ QUESTIONS 1-9: 10

## 2020-12-01 NOTE — TELEPHONE ENCOUNTER
Patient has had diarrhea and vomiting since yesterday afternoon after eating macaroni salad. She continued to have the same symptoms along with nausea all day today. She also has a swollen area on her buttocks. Call her to advise at [phone]

## 2020-12-02 ENCOUNTER — ANTICOAGULATION THERAPY VISIT (OUTPATIENT)
Dept: INTERNAL MEDICINE | Facility: CLINIC | Age: 80
End: 2020-12-02

## 2020-12-02 DIAGNOSIS — I48.20 CHRONIC ATRIAL FIBRILLATION (H): ICD-10-CM

## 2020-12-02 DIAGNOSIS — Z79.01 LONG TERM (CURRENT) USE OF ANTICOAGULANTS: ICD-10-CM

## 2020-12-02 LAB — INR PPP: 3 (ref 0.9–1.1)

## 2020-12-02 NOTE — PROGRESS NOTES
ANTICOAGULATION MANAGEMENT     Patient Name:  Savanna Rehman  Date:  12/2/2020    ASSESSMENT /SUBJECTIVE:    Today's INR result of 3.0 is therapeutic. Goal INR of 2.0-3.0      Warfarin dose taken: Warfarin taken as instructed    Diet: No new diet changes affecting INR    Medication changes/ interactions: No new medications/supplements affecting INR    Previous INR: Therapeutic     S/S of bleeding or thromboembolism: No    New injury or illness: No    Upcoming surgery, procedure or cardioversion: No    Additional findings: None      PLAN:    Telephone call with home care nurse Raj Johnson regarding INR result and instructed:     Warfarin Dosing Instructions: Continue your current warfarin dose 2.5 mg daily    Instructed patient to follow up no later than: 1 week  Orders given to  Homecare nurse/facility to recheck    Education provided: Monitoring for bleeding signs and symptoms and Monitoring for clotting signs and symptoms      Nurse Raj verbalizes understanding and agrees to warfarin dosing plan.    Instructed to call the Anticoagulation Clinic for any changes, questions or concerns. (#[phone])        Azul Whitaker RN      OBJECTIVE:  Recent labs: (last 7 days)     12/02/20   INR 3.0*         No question data found.  Anticoagulation Summary  As of 12/2/2020    INR goal:  2.0-3.0   TTR:  70.5 % (1 y)   INR used for dosing:  3.0 (12/2/2020)   Warfarin maintenance plan:  2.5 mg (2.5 mg x 1) every day   Full warfarin instructions:  2.5 mg every day   Weekly warfarin total:  17.5 mg   No change documented:  Azul Whitaker RN   Plan last modified:  Azul Whitaker RN (10/28/2020)   Next INR check:  12/9/2020   Priority:  Maintenance   Target end date:  Indefinite    Indications    Permanent atrial fibrillation (H) (Resolved) [I48.21]  Chronic atrial fibrillation (H) [I48.20]  Long term (current) use of anticoagulants [Z79.01]             Anticoagulation Episode Summary     INR check location:       Preferred lab:  EXTERNAL LAB    Send INR reminders to:  NIKKI CHASE    Comments:  Home care       Anticoagulation Care Providers     Provider Role Specialty Phone number    Patti Suárez MD Referring Internal Medicine [phone]

## 2020-12-02 NOTE — TELEPHONE ENCOUNTER
RN called patient back to discuss her vomiting and diarrhea but she reports she has had the diarrhea for many months now and it is just a part of her life. Has had 3 episodes of diarrhea this week and had 1-3 emesis  In the past 24 hours.  Denies fever, feels nauseated.  States she is not drinking much in the way of fluids as she doesn't get thirsty.   Encouraged to make an effort to drink more fluids.       What she is calling about is a sore area it sounds like it is just below buttocks in perineal area.  States sore is about the size of her 5th finger and is quite painful. States there is no discharge from the wound.   She wonders if it has been caused by diarrhea.  She also reports that she is incontinent of urine at night.  Covers her bed with Chux pads every night.   Patient does not have access to a bathtub in her apartment.  Encouraged to keep area as clean and dry as she can, change incontinence briefs as soon as they are damp, lay on sides to keep pressure off area.  She has a visiting nurse checking her INR today but patient refuses to ask nurse to look at the sore.  States her daughter is an RN and she put out a call to her to come over today and look at the sore.  Advised to be seen in clinic if symptoms worsen.  BENITA Graham R.N.

## 2020-12-02 NOTE — TELEPHONE ENCOUNTER
Home nurse Raj calling from patients home stating the area on her bottom is not in the retacal area but off to the side. It looks like an enlarged vein. No redness, warmth to touch or drainage. Nurse does not think it looks like a boil or any sign of infection. Read Ayanna's instructions to nurse and patient. Patient agrees to an follow these instructions until she can get in for an appointment next week. Will call back if symptoms change or worsen in the mean time.

## 2020-12-04 ENCOUNTER — HOSPITAL ENCOUNTER (EMERGENCY)
Facility: CLINIC | Age: 80
Discharge: HOME | End: 2020-12-04
Attending: EMERGENCY MEDICINE | Admitting: EMERGENCY MEDICINE
Payer: COMMERCIAL

## 2020-12-04 ENCOUNTER — APPOINTMENT (OUTPATIENT)
Dept: CT IMAGING | Facility: CLINIC | Age: 80
End: 2020-12-04
Attending: EMERGENCY MEDICINE
Payer: COMMERCIAL

## 2020-12-04 ENCOUNTER — VIRTUAL VISIT (OUTPATIENT)
Dept: INTERNAL MEDICINE | Facility: CLINIC | Age: 80
End: 2020-12-04
Payer: COMMERCIAL

## 2020-12-04 VITALS
DIASTOLIC BLOOD PRESSURE: 90 MMHG | HEART RATE: 76 BPM | SYSTOLIC BLOOD PRESSURE: 117 MMHG | RESPIRATION RATE: 18 BRPM | OXYGEN SATURATION: 97 % | TEMPERATURE: 98.3 F

## 2020-12-04 DIAGNOSIS — L02.31 GLUTEAL ABSCESS: ICD-10-CM

## 2020-12-04 DIAGNOSIS — K62.5 RECTAL BLEEDING: Primary | ICD-10-CM

## 2020-12-04 DIAGNOSIS — N30.00 ACUTE CYSTITIS WITHOUT HEMATURIA: ICD-10-CM

## 2020-12-04 DIAGNOSIS — R10.32 LLQ ABDOMINAL PAIN: ICD-10-CM

## 2020-12-04 LAB
ALBUMIN UR-MCNC: NEGATIVE MG/DL
ANION GAP SERPL CALCULATED.3IONS-SCNC: 4 MMOL/L (ref 3–14)
APPEARANCE UR: CLEAR
BASOPHILS # BLD AUTO: 0 10E9/L (ref 0–0.2)
BASOPHILS NFR BLD AUTO: 0.4 %
BILIRUB UR QL STRIP: NEGATIVE
BUN SERPL-MCNC: 15 MG/DL (ref 7–30)
CALCIUM SERPL-MCNC: 9.6 MG/DL (ref 8.5–10.1)
CHLORIDE SERPL-SCNC: 101 MMOL/L (ref 94–109)
CO2 SERPL-SCNC: 31 MMOL/L (ref 20–32)
COLOR UR AUTO: ABNORMAL
CREAT SERPL-MCNC: 1 MG/DL (ref 0.52–1.04)
DIFFERENTIAL METHOD BLD: NORMAL
EOSINOPHIL # BLD AUTO: 0.3 10E9/L (ref 0–0.7)
EOSINOPHIL NFR BLD AUTO: 2.9 %
ERYTHROCYTE [DISTWIDTH] IN BLOOD BY AUTOMATED COUNT: 13.4 % (ref 10–15)
GFR SERPL CREATININE-BSD FRML MDRD: 54 ML/MIN/{1.73_M2}
GLUCOSE SERPL-MCNC: 100 MG/DL (ref 70–99)
GLUCOSE UR STRIP-MCNC: NEGATIVE MG/DL
HCT VFR BLD AUTO: 46.3 % (ref 35–47)
HGB BLD-MCNC: 15.2 G/DL (ref 11.7–15.7)
HGB UR QL STRIP: NEGATIVE
IMM GRANULOCYTES # BLD: 0 10E9/L (ref 0–0.4)
IMM GRANULOCYTES NFR BLD: 0.2 %
INR PPP: 2.19 (ref 0.86–1.14)
KETONES UR STRIP-MCNC: NEGATIVE MG/DL
LEUKOCYTE ESTERASE UR QL STRIP: ABNORMAL
LYMPHOCYTES # BLD AUTO: 2.6 10E9/L (ref 0.8–5.3)
LYMPHOCYTES NFR BLD AUTO: 28.2 %
MCH RBC QN AUTO: 30.5 PG (ref 26.5–33)
MCHC RBC AUTO-ENTMCNC: 32.8 G/DL (ref 31.5–36.5)
MCV RBC AUTO: 93 FL (ref 78–100)
MONOCYTES # BLD AUTO: 1.2 10E9/L (ref 0–1.3)
MONOCYTES NFR BLD AUTO: 12.4 %
NEUTROPHILS # BLD AUTO: 5.2 10E9/L (ref 1.6–8.3)
NEUTROPHILS NFR BLD AUTO: 55.9 %
NITRATE UR QL: POSITIVE
NRBC # BLD AUTO: 0 10*3/UL
NRBC BLD AUTO-RTO: 0 /100
PH UR STRIP: 7 PH (ref 5–7)
PLATELET # BLD AUTO: 215 10E9/L (ref 150–450)
POTASSIUM SERPL-SCNC: 4 MMOL/L (ref 3.4–5.3)
RBC # BLD AUTO: 4.98 10E12/L (ref 3.8–5.2)
RBC #/AREA URNS AUTO: <1 /HPF (ref 0–2)
SODIUM SERPL-SCNC: 136 MMOL/L (ref 133–144)
SOURCE: ABNORMAL
SP GR UR STRIP: 1 (ref 1–1.03)
SQUAMOUS #/AREA URNS AUTO: 2 /HPF (ref 0–1)
UROBILINOGEN UR STRIP-MCNC: NORMAL MG/DL (ref 0–2)
WBC # BLD AUTO: 9.4 10E9/L (ref 4–11)
WBC #/AREA URNS AUTO: 38 /HPF (ref 0–5)

## 2020-12-04 PROCEDURE — 74177 CT ABD & PELVIS W/CONTRAST: CPT

## 2020-12-04 PROCEDURE — 271N000002 HC RX 271: Performed by: EMERGENCY MEDICINE

## 2020-12-04 PROCEDURE — 80048 BASIC METABOLIC PNL TOTAL CA: CPT | Performed by: EMERGENCY MEDICINE

## 2020-12-04 PROCEDURE — 250N000009 HC RX 250: Performed by: EMERGENCY MEDICINE

## 2020-12-04 PROCEDURE — 87186 SC STD MICRODIL/AGAR DIL: CPT | Performed by: EMERGENCY MEDICINE

## 2020-12-04 PROCEDURE — 87086 URINE CULTURE/COLONY COUNT: CPT | Performed by: EMERGENCY MEDICINE

## 2020-12-04 PROCEDURE — 85610 PROTHROMBIN TIME: CPT | Performed by: EMERGENCY MEDICINE

## 2020-12-04 PROCEDURE — 250N000011 HC RX IP 250 OP 636: Performed by: EMERGENCY MEDICINE

## 2020-12-04 PROCEDURE — 250N000013 HC RX MED GY IP 250 OP 250 PS 637: Performed by: EMERGENCY MEDICINE

## 2020-12-04 PROCEDURE — 99207 PR NO CHARGE LOS: CPT | Performed by: INTERNAL MEDICINE

## 2020-12-04 PROCEDURE — 99285 EMERGENCY DEPT VISIT HI MDM: CPT | Mod: 25

## 2020-12-04 PROCEDURE — 87088 URINE BACTERIA CULTURE: CPT | Performed by: EMERGENCY MEDICINE

## 2020-12-04 PROCEDURE — 85025 COMPLETE CBC W/AUTO DIFF WBC: CPT | Performed by: EMERGENCY MEDICINE

## 2020-12-04 PROCEDURE — 81001 URINALYSIS AUTO W/SCOPE: CPT | Performed by: EMERGENCY MEDICINE

## 2020-12-04 RX ORDER — IOPAMIDOL 755 MG/ML
500 INJECTION, SOLUTION INTRAVASCULAR ONCE
Status: COMPLETED | OUTPATIENT
Start: 2020-12-04 | End: 2020-12-04

## 2020-12-04 RX ORDER — CLINDAMYCIN HCL 150 MG
450 CAPSULE ORAL ONCE
Status: COMPLETED | OUTPATIENT
Start: 2020-12-04 | End: 2020-12-04

## 2020-12-04 RX ORDER — GRANULES FOR ORAL 3 G/1
3 POWDER ORAL ONCE
Status: COMPLETED | OUTPATIENT
Start: 2020-12-04 | End: 2020-12-04

## 2020-12-04 RX ORDER — METHYLCELLULOSE 4000CPS 30 %
POWDER (GRAM) MISCELLANEOUS ONCE
Status: COMPLETED | OUTPATIENT
Start: 2020-12-04 | End: 2020-12-04

## 2020-12-04 RX ORDER — CLINDAMYCIN HCL 150 MG
450 CAPSULE ORAL 3 TIMES DAILY
Qty: 90 CAPSULE | Refills: 0 | Status: SHIPPED | OUTPATIENT
Start: 2020-12-04 | End: 2020-12-14

## 2020-12-04 RX ADMIN — Medication: at 18:50

## 2020-12-04 RX ADMIN — SODIUM CHLORIDE 65 ML: 9 INJECTION, SOLUTION INTRAVENOUS at 19:59

## 2020-12-04 RX ADMIN — FOSFOMYCIN TROMETHAMINE 3 G: 3 POWDER ORAL at 22:38

## 2020-12-04 RX ADMIN — EPINEPHRINE BITARTRATE 3 ML: 1 POWDER at 18:49

## 2020-12-04 RX ADMIN — IOPAMIDOL 100 ML: 755 INJECTION, SOLUTION INTRAVENOUS at 19:59

## 2020-12-04 RX ADMIN — CLINDAMYCIN HYDROCHLORIDE 450 MG: 150 CAPSULE ORAL at 22:49

## 2020-12-04 ASSESSMENT — ENCOUNTER SYMPTOMS
DYSURIA: 0
HEMATURIA: 0
DIARRHEA: 1
BLOOD IN STOOL: 1
WOUND: 1
ABDOMINAL PAIN: 1
FREQUENCY: 0

## 2020-12-04 NOTE — ED PROVIDER NOTES
History   Chief Complaint:  Wound Check and Diarrhea    The history is provided by the patient.     Savanna Rehman is a 80 year old female with a history of type 2 DM, CHF, neuropathy, and anticoagulation on coumadin for Afib who presents for evaluation of a wound check and diarrhea. She reports a wound on her right buttocks for the past 2 days that has been bleeding and painful, as well as diarrhea and possible rectal bleeding. She reports bright red blood and a blood clot in her stool. She also reports lower abdominal pain for the past 2-3 weeks. She was seen earlier today by internal medicine for these symptoms via a virtual visit, and was referred to ED. She denies any dysuria, hematuria, or frequency. She is incontinent but this is chronic. The patient notes she was diagnosed with a UTI 11/20 and was treated with cephalexin.     Allergies:  Augmented Betamethasone Diprop  Aspirin  Bacitracin  Bactrim   Coumadin  Darvon   Dilaudid   Levaquin   Neosporin   Nitrofurantoin  Oxycodone  Percodan  Tramadol  Vicodin  Xarelto  Codeine    Medications:    Colazal  Glucotrol  Ditropan  Coumadin    Past Medical History:    Anemia  Afib  CAD  CHF  Fibromyalgia  DDD  GERD  Gout  Hiatal hernia  Mumps  Neuropathy  Palpitations  Sleep apnea  Angioedema  Obesity  Hyperlipidemia  DM type 2    Past Surgical History:    Appendectomy  Cholecystectomy  Colonoscopy  Coronary angiography  Cystoscopy  Left heart cath  Hysterectomy  Laparoscopic nissen fundoplication  Tonsillectomy  Transposition ulnar nerve elbow     Family History:    Alzheimer disease  Lung cancer     Social History:  Marital Status:    Tobacco use: Former smoker; quit 2014  Alcohol use: Yes; socially   Drug use: No  PCP: Patti Suárez MD    Review of Systems   Gastrointestinal: Positive for abdominal pain, blood in stool and diarrhea.   Genitourinary: Negative for dysuria, frequency and hematuria.   Skin: Positive for wound.   All other  systems reviewed and are negative.    Physical Exam     Patient Vitals for the past 24 hrs:   BP Temp Temp src Pulse Resp SpO2   12/04/20 2302 (!) 117/90 98.3  F (36.8  C) Oral 76 18 97 %   12/04/20 1536 112/79 97.9  F (36.6  C) Temporal 78 14 95 %       Physical Exam  Constitutional: Vital signs reviewed as above.   HENT:    Head: No external signs of trauma noted.   Eyes: Conjunctivae are normal. Pupils are equal, round, and reactive to light.   Cardiovascular:    Normal rate, regular rhythm and normal heart sounds.     Exam reveals no friction rub.     No murmur heard.  Pulmonary/Chest:    Effort normal and breath sounds normal.    No respiratory distress.    There are no wheezes.    There are no rales.   Gastrointestinal:    Soft.    Bowel sounds normal.    There is no distension.    There is LLQ/suprapubic tenderness.    There is no rebound or guarding.   Rectal (Chaperoned)   Normal tone   Questionable gross blood (there is an abscess on the right gluteus with sanguinous drainage that makes an accurate assessment of rectal bleeding difficult)   No external hemorrhoids noted   No anal fissures noted   No stool ball palpated in rectal vault  Musculoskeletal:    Normal range of motion.    Normal Tone  Neurological: Patient is alert and oriented to person, place, and time.   Skin: Skin is warm and dry. Patient is not diaphoretic.  There is a fluctuant area with surrounding induration on the medial right gluteus.  There is sanguinous drainage noted from this area.  Psychiatric: The patient appears calm    Emergency Department Course     Imaging:  Radiology findings were communicated with the patient who voiced understanding of the findings.    CT Abdomen Pelvis w/ IV contrast:   1.  Subcutaneous stranding with ill-defined soft tissue along the right medial gluteal fold extending anteriorly into the perianal region compatible with inflammation and probable early phlegmon although no discrete abscess.   2.   Hysterectomy and cholecystectomy.   3.  Cirrhotic appearance of the liver. Underlying fatty change in the liver.   4.  Colonic diverticula most pronounced in the sigmoid colon without evidence for acute diverticulitis. Stable soft tissue prominence along the left lateral aspect of the sigmoid colon in the deep left pelvis, as per radiology.    Laboratory:  Laboratory findings were communicated with the patient who voiced understanding of the findings.    CBC: WBC: 9.4, HGB: 15.2, PLT: 215    BMP: Glucose 100 (H), GFR: 54 (L), o/w WNL (Creatinine: 1.00)    INR: 2.19 (H)    UA: Nitrite: Positive, Leukocyte Esterase: Large, WBC: 38 (H), Squamous Epithelial: 2 (H), o/w Negative    Urine Culture: Pending    Interventions:  1849: LET, 3 mL, Topical  1850: Methycellulose powder, Topical  2238: Monurol, 1 packet, Oral   2249: Cleocin, 450 mg, Oral     Emergency Department Course:  Past medical records, nursing notes, and vitals reviewed.    ED Course as of Dec 04 2342   Fri Dec 04, 2020   1800 Performed an exam of the patient as documented above.         2221 Updated patient and discussed plan of care.         Findings and plan explained to the Patient. Patient discharged home with instructions regarding supportive care, medications, and reasons to return. The importance of close follow-up was reviewed. The patient was prescribed Cleocin.    I personally reviewed the laboratory and imaging results with the Patient and answered all related questions prior to discharge.     Impression & Plan     Medical Decision Making:  This 80-year-old female patient presents the ED due to concerns for a gluteal abscess.  Please see the HPI and exam for specifics.  Patient's work-up included a CT scan to evaluate for fistula.  Fortunately none was found.  There was reticulosis but no diverticulitis noted.  Since the wound was already draining, and after shared decision-making with the patient, no further incision and drainage was done (the  patient was concerned about hygiene when she would wipe from having bowel movements).  Given her multiple allergies and recent Keflex course I elected to prescribe a single dose of oral fosfomycin for the patient's urinary findings today.  Additionally, she will be given a prescription for clindamycin for the abscess.  She should follow very closely in the outpatient setting and return to the ED with any new or worsening symptoms.  Anticipatory guidance given prior to discharge.      Diagnosis:    ICD-10-CM    1. Gluteal abscess  L02.31    2. LLQ abdominal pain  R10.32    3. Acute cystitis without hematuria  N30.00        Disposition:  Discharged to home.    Discharge Medications:  Discharge Medication List as of 12/4/2020 10:46 PM      START taking these medications    Details   clindamycin (CLEOCIN) 150 MG capsule Take 3 capsules (450 mg) by mouth 3 times daily for 10 days, Disp-90 capsule, R-0, E-Prescribe             Scribe Disclosure:  I, Lala Mccloud, am serving as a scribe at 5:46 PM on 12/4/2020 to document services personally performed by Peter Tello DO based on my observations and the provider's statements to me.      Peter Tello DO  12/04/20 3861

## 2020-12-04 NOTE — PROGRESS NOTES
Savanna Rehman is a 80 year old female who is being evaluated via a billable video visit.      Subjective     We attempted to do a video visit however the video was not working well.  When I first got a hold of her, she started to tell me that this area is an open sore and it has been fairly sore and bleeding.  She then had urgency and had to go to the bathroom.  When I called her back, she reported to me that she has been passing  blood per rectum that  is separate from the sore she has on the buttock.  She hasis wiped 3 times with continued bleeding.  The blood is bright red but also some dark blood.  I advised her to go to the emergency department.    Dorothy Soto MD

## 2020-12-04 NOTE — ED TRIAGE NOTES
A&O x4, ABCs intact. Pt presents with wound to right buttock that she has had for 2 days. Pt states that she went to the restroom and noted that the toilet paper had blood on it. Pt denies that the toilet bowl had blood.

## 2020-12-04 NOTE — ED AVS SNAPSHOT
[facility] Emergency Dept  [address]  [facility] [address]  Phone: [phone]  Fax: [phone]                                    Savanna Rehman   MRN: [id number]    Department: [facility] Emergency Dept   Date of Visit: 12/4/2020           After Visit Summary Signature Page    I have received my discharge instructions, and my questions have been answered. I have discussed any challenges I see with this plan with the nurse or doctor.    ..........................................................................................................................................  Patient/Patient Representative Signature      ..........................................................................................................................................  Patient Representative Print Name and Relationship to Patient    ..................................................               ................................................  Date                                   Time    ..........................................................................................................................................  Reviewed by Signature/Title    ...................................................              ..............................................  Date                                               Time          22EPIC Rev 08/18

## 2020-12-05 ENCOUNTER — NURSE TRIAGE (OUTPATIENT)
Dept: NURSING | Facility: CLINIC | Age: 80
End: 2020-12-05

## 2020-12-05 NOTE — DISCHARGE INSTRUCTIONS
What do you do next:   Continue your home medications unless we have specifically changed them  Take the antibiotic medication I have prescribed for your abscess.  Please keep this area clean.   Follow up as indicated below    When do you return: If you have uncontrolled fevers, intractable vomiting, rash or concerning findings for allergic reaction, severe abdominal pain, or any other symptoms that concern you, please return to the ED for reevaluation.    Thank you for allowing us to care for you today.

## 2020-12-06 ENCOUNTER — TELEPHONE (OUTPATIENT)
Dept: EMERGENCY MEDICINE | Facility: CLINIC | Age: 80
End: 2020-12-06

## 2020-12-06 LAB
BACTERIA SPEC CULT: ABNORMAL
Lab: ABNORMAL
SPECIMEN SOURCE: ABNORMAL

## 2020-12-06 NOTE — TELEPHONE ENCOUNTER
[facility] Emergency Department Lab result notification [Adult-Female]    [place] ED lab result protocol used  Urine Culture    Reason for call  Notify of lab results, assess symptoms,  review ED providers recommendations/discharge instructions (if necessary) and advise per ED lab result f/u protocol    Lab Result (including Rx patient on, if applicable)  Final urine culture on 12/6/20 shows the presence of bacteria(s): >100,000 colonies/mL Citrobacter freundii  [place] Emergency Dept/Urgent Care discharge antibiotic: Is on Cleocin for gluteal abscess and was given fosfomycin in ED.  As per  ED lab result protocol, treat per Urine culture protocol.  Information table from ED Provider visit on 12/4/20  Symptoms reported at ED visit (Chief complaint, HPI) Chief Complaint:  Wound Check and Diarrhea     The history is provided by the patient.      Savanna Rehman is a 80 year old female with a history of type 2 DM, CHF, neuropathy, and anticoagulation on coumadin for Afib who presents for evaluation of a wound check and diarrhea. She reports a wound on her right buttocks for the past 2 days that has been bleeding and painful, as well as diarrhea and possible rectal bleeding. She reports bright red blood and a blood clot in her stool. She also reports lower abdominal pain for the past 2-3 weeks. She was seen earlier today by internal medicine for these symptoms via a virtual visit, and was referred to ED. She denies any dysuria, hematuria, or frequency. She is incontinent but this is chronic. The patient notes she was diagnosed with a UTI 11/20 and was treated with cephalexin.    Significant Medical hx, if applicable (i.e. CKD, diabetes) Diabetes   Allergies Allergies   Allergen Reactions     Augmented Betamethasone Diprop [Betamethasone] Other (See Comments)     Severe yeast infection     Petroleum Jelly [Petrolatum] Anaphylaxis     Rash and swelling     Shellfish-Derived Products Anaphylaxis      Tongue swelling     Aspirin Swelling     tiongue swelling     Bacitracin      Rash swelling     Bactrim [Sulfamethoxazole W/Trimethoprim] Dizziness     Coumadin [Warfarin] Swelling     Leg swelling     Darvon [Propoxyphene] Swelling     Throat closes     Dilaudid [Hydromorphone]      Levaquin [Levofloxacin] Swelling     Tongue swelling     Neosporin [Neomycin-Polymyx-Gramicid] Swelling     rash     Nitrofurantoin      SOB, GI upset,     Oxycodone      Severe itching     Percodan [Oxycodone-Aspirin]      Severe itching     Tramadol      Vicodin [Hydrocodone-Acetaminophen]      Severe itching       Xarelto [Rivaroxaban]      Adhesive Tape Rash     Band aids      Codeine Rash      Weight, if applicable Wt Readings from Last 2 Encounters:   11/30/20 117 kg (258 lb)   08/06/20 110.7 kg (244 lb)      Coumadin/Warfarin [Yes /No] Coumadin   Creatinine Level (mg/dl) Creatinine   Date Value Ref Range Status   12/04/2020 1.00 0.52 - 1.04 mg/dL Final      Creatinine clearance (ml/min), if applicable Serum creatinine: 1 mg/dL 12/04/20 1850  Estimated creatinine clearance: 62.3 mL/min   Pregnant (Yes/No/NA) NA   Breastfeeding (Yes/No/NA) NA   ED providers Impression and Plan (applicable information) Medical Decision Making:  This 80-year-old female patient presents the ED due to concerns for a gluteal abscess.  Please see the HPI and exam for specifics.  Patient's work-up included a CT scan to evaluate for fistula.  Fortunately none was found.  There was reticulosis but no diverticulitis noted.  Since the wound was already draining, and after shared decision-making with the patient, no further incision and drainage was done (the patient was concerned about hygiene when she would wipe from having bowel movements).  Given her multiple allergies and recent Keflex course I elected to prescribe a single dose of oral fosfomycin for the patient's urinary findings today.  Additionally, she will be given a prescription for clindamycin for  the abscess.  She should follow very closely in the outpatient setting and return to the ED with any new or worsening symptoms.  Anticipatory guidance given prior to discharge.      ED diagnosis  Gluteal abscess     LLQ abdominal pain     Acute cystitis without hematuria     ED provider Peter Tello DO      RN Assessment (Patient s current Symptoms), include time called.  [Insert Left message here if message left]  3:02 spoke with patient and she states she thought she had passing blood from her rectum. She was told in ER that no blood in her rectum. Patient states she was seen for diarrhea, she states that after taking the medication given the oral med she immediately went to the bathroom. She does not think that she absorbed any medications given in the ER.  Patient reports that she would like her urologist to follow up with this result.  She states no urine symptoms.       RN Recommendations/Instructions per [place] ED lab result protocol  Patient notified of lab result and treatment recommendations.  Routed result to urologist Dr. Wilson.       Please Contact your PCP clinic or return to the Emergency department if your:    Symptoms worsen or other concerning symptom's.    PCP follow-up Questions asked: YES       [RN Name]  Roxie Landa  [facility]  Emergency Dept Lab Result RN  Ph# [phone]    Copy of Lab result   Contains abnormal data Urine Culture  Order: [id number]  Status:  Final result   Visible to patient:  No (inaccessible in MyChart) Dx:  Gluteal abscess  Specimen Information: Catheterized Urine        Component 2d ago   Specimen Description Catheterized Urine    Special Requests Specimen received in preservative    Culture Micro Abnormal   >100,000 colonies/mL   Citrobacter freundii complex     Resulting Agency [facility]   Susceptibility    Citrobacter freundii complex (1)    Antibiotic Interpretation Sensitivity Method Status   CEFAZOLIN Resistant  >=64 ug/mL MASOUD Final    Cefazolin MASOUD breakpoints are for the treatment of uncomplicated urinary tract    infections.  For the treatment of systemic infections, please contact the   laboratory for additional testing.   Intrinsically Resistant   CEFOXITIN Resistant 32 ug/mL MASOUD Final    Intrinsically Resistant   CEFTAZIDIME Sensitive <=1 ug/mL MASOUD Final   CEFTRIAXONE Sensitive <=1 ug/mL MASOUD Final   CIPROFLOXACIN Sensitive <=0.25 ug/mL MASOUD Final   GENTAMICIN Sensitive <=1 ug/mL MASOUD Final   LEVOFLOXACIN Sensitive <=0.12 ug/mL MASOUD Final   NITROFURANTOIN Sensitive <=16 ug/mL MASOUD Final   TOBRAMYCIN Sensitive <=1 ug/mL MASOUD Final   Trimethoprim/Sulfa Sensitive <=1/19 ug/mL MASOUD Final   Piperacillin/Tazo Sensitive <=4 ug/mL MASOUD Final   CEFEPIME Sensitive <=1 ug/mL MASOUD Final         Specimen Collected: 12/04/20  6:50 PM Last Resulted: 12/06/20  1:43 PM

## 2020-12-06 NOTE — TELEPHONE ENCOUNTER
"Patient calling reporting she has bloody diarrhea. Had bloody diarrhea 3 times today. States the blood appears to look \"old blood\" and is \"maroon\" color. Patient was seen at the emergency department yesterday for the bloody diarrhea and a wound on her bottoms that is also bleeding blood. Patient states she feels the same as yesterday. Patient voiding. Per  Guideline, informed patient RN will page on call provider for second level triage.     Paged on-call provider Dr. Beatrice Isaac at 9:04pm, for [facility], via [employer] at 9:04pm to call RN directly at [phone].    Dr. Isaac called RN and advised patient to follow up with her primary care provider on Monday. RN called patient and advised provider's recommendation. Advised patient increase her fluid intake.     Wyatt Juarez RN  [facility]     Reason for Disposition    [1] Drinking very little AND [2] dehydration suspected (e.g., no urine > 12 hours, very dry mouth, very lightheaded)    Additional Information    Negative: Shock suspected (e.g., cold/pale/clammy skin, too weak to stand, low BP, rapid pulse)    Negative: Difficult to awaken or acting confused (e.g., disoriented, slurred speech)    Negative: Sounds like a life-threatening emergency to the triager    Negative: [1] SEVERE abdominal pain (e.g., excruciating) AND [2] present > 1 hour    Negative: [1] SEVERE abdominal pain AND [2] age > 60    Negative: [1] Blood in the stool AND [2] moderate or large amount of blood    Negative: Black or tarry bowel movements  (Exception: chronic-unchanged  black-grey bowel movements AND is taking iron pills or Pepto-bismol)    Protocols used: DIARRHEA-A-AH      "

## 2020-12-07 ENCOUNTER — TELEPHONE (OUTPATIENT)
Dept: ANTICOAGULATION | Facility: CLINIC | Age: 80
End: 2020-12-07

## 2020-12-07 ENCOUNTER — TELEPHONE (OUTPATIENT)
Dept: INTERNAL MEDICINE | Facility: CLINIC | Age: 80
End: 2020-12-07

## 2020-12-07 DIAGNOSIS — Z53.9 DIAGNOSIS NOT YET DEFINED: Primary | ICD-10-CM

## 2020-12-07 PROCEDURE — G0179 MD RECERTIFICATION HHA PT: HCPCS | Performed by: INTERNAL MEDICINE

## 2020-12-07 NOTE — TELEPHONE ENCOUNTER
ANTICOAGULATION  MANAGEMENT: Discharge Review    Savanna Rehman chart reviewed for anticoagulation continuity of care    Emergency room visit on 12/4/20 for gluteal abscess and rectal bleeding.    Discharge disposition: Home with Home Care    Results:    Recent labs: (last 7 days)     12/02/20 12/04/20  1850   INR 3.0* 2.19*     Anticoagulation inpatient management:     not applicable     Anticoagulation discharge instructions:     Warfarin dosing: home regimen continued   Bridging: No   INR goal change: No      Medication changes affecting anticoagulation: No.  Patient was given a dose of Fosfomycin and also discharged on clindamycin.  These do not interact with warfarin per [employer].    Additional factors affecting anticoagulation: Yes: was having rectal bleeding.  Will need to continue to assess this.    Plan     No adjustment to anticoagulation plan needed  INR was taken in ER and was on the lower end of her therapeutic range which should help to promote healing and decrease risk of rectal bleeding.  Home care was due to recheck INR on 12/9.  Will keep this as previously scheduled.    Patient not contacted    No adjustment to Anticoagulation Calendar was required    Bella Arechiga RN

## 2020-12-07 NOTE — TELEPHONE ENCOUNTER
Patient calling  She went to ED and was there for 5 hours.   She can not get a hold of her urologist   She was given medication for a boil on her buttocks  She feels she needs a refill of Clindamycin   Patient wants this sent to [facility]. Patient wants to talk with a RN  Please advise. Ok to call and  [phone]

## 2020-12-08 ENCOUNTER — PATIENT OUTREACH (OUTPATIENT)
Dept: UROLOGY | Facility: CLINIC | Age: 80
End: 2020-12-08

## 2020-12-08 DIAGNOSIS — N39.0 RECURRENT UTI: Primary | ICD-10-CM

## 2020-12-08 RX ORDER — CEFDINIR 300 MG/1
300 CAPSULE ORAL 2 TIMES DAILY
Qty: 14 CAPSULE | Refills: 0 | Status: SHIPPED | OUTPATIENT
Start: 2020-12-08 | End: 2020-12-15

## 2020-12-08 NOTE — TELEPHONE ENCOUNTER
Patient was in the ER on Friday due continued diarrhea and stomach pains that resulted in rectal bleeding. Patient was straight catheterized for a urine specimen which came back positive. She also was diagnosed with gluteal abscess and she has diverticulosis.  Results for orders placed or performed during the hospital encounter of 12/04/20   CT Abdomen Pelvis w Contrast    Narrative    EXAM: CT ABDOMEN PELVIS W CONTRAST  LOCATION: [facility]  DATE/TIME: 12/4/2020 7:58 PM    INDICATION: Left lower quadrant abdominal pain.  COMPARISON: 01/27/2020 and 10/04/2019.  TECHNIQUE: CT scan of the abdomen and pelvis was performed following injection of IV contrast. Multiplanar reformats were obtained. Dose reduction techniques were used.  CONTRAST: 100mL Isovue-370    FINDINGS:   LOWER CHEST: Normal.    HEPATOBILIARY: Cirrhotic appearance of the liver. Cholecystectomy. Underlying fatty change in the liver.     PANCREAS: Normal.    SPLEEN: Pancreatic atrophy.    ADRENAL GLANDS: Normal.    KIDNEYS/BLADDER: Normal.    BOWEL: Multiple colonic diverticula most pronounced within the sigmoid colon without evidence for acute diverticulitis. There is stable chronic ill-defined soft tissue along the lateral margin of the sigmoid colon in the pelvis, stable dating back to   10/04/2019.    LYMPH NODES: Normal.    VASCULATURE: Abdominal aortic ectasia with the abdominal aorta measuring up to 2.2 cm.    PELVIC ORGANS: There is some subcutaneous stranding along the medial right gluteal fold compatible with cellulitis with a 2.5 x 1.3 cm region of soft tissue thickening along the medial gluteal margin extending anteriorly to the perianal region also   compatible with inflammation. No discrete abscess.    MUSCULOSKELETAL: Normal.      Impression    IMPRESSION:   1.  Subcutaneous stranding with ill-defined soft tissue along the right medial gluteal fold extending anteriorly into the perianal region compatible with inflammation and  probable early phlegmon although no discrete abscess.    2.  Hysterectomy and cholecystectomy.    3.  Cirrhotic appearance of the liver. Underlying fatty change in the liver.    4.  Colonic diverticula most pronounced in the sigmoid colon without evidence for acute diverticulitis. Stable soft tissue prominence along the left lateral aspect of the sigmoid colon in the deep left pelvis.           Dr. Wilson is treating UTI with omnicef and wants patient seen in infectious disease to help manage UTI's with given allergie list. Patient is in agreement with plan, but would like to see someone at the [place], [place], or [place] locations in that order.        Adelaida SHIPMAN RN  Care Coordinator   [facility]  [phone]

## 2020-12-08 NOTE — TELEPHONE ENCOUNTER
Spoke to pt, advised her urologist sent in a Rx for antibiotic for UTI.  Pt does not need a refill of clindamycin she got in ED.

## 2020-12-09 ENCOUNTER — ANTICOAGULATION THERAPY VISIT (OUTPATIENT)
Dept: INTERNAL MEDICINE | Facility: CLINIC | Age: 80
End: 2020-12-09

## 2020-12-09 DIAGNOSIS — Z79.01 LONG TERM (CURRENT) USE OF ANTICOAGULANTS: ICD-10-CM

## 2020-12-09 DIAGNOSIS — I48.20 CHRONIC ATRIAL FIBRILLATION (H): ICD-10-CM

## 2020-12-09 LAB — INR PPP: 2.1 (ref 0.9–1.1)

## 2020-12-09 NOTE — PROGRESS NOTES
ANTICOAGULATION MANAGEMENT     Patient Name:  Savanna Rehman  Date:  2020    ASSESSMENT /SUBJECTIVE:    Today's INR result of 2.1 is therapeutic. Goal INR of 2.0-3.0      Warfarin dose taken: Warfarin taken as instructed    Diet: No new diet changes affecting INR    Medication changes/ interactions: Interaction between omnicef and clindamycin stopped on 12/15/20 and 20 and warfarin may be affecting INR    Previous INR: Therapeutic     S/S of bleeding or thromboembolism: No    New injury or illness: Yes: UTI in November; diarrhea o/o for past couple weeks; abscess on buttock    Upcoming surgery, procedure or cardioversion: No    Additional findings: None      PLAN:    Telephone call with home care nurse Raj regarding INR result and instructed:     Warfarin Dosing Instructions: Continue your current warfarin dose 2.5 mg Daily    Instructed patient to follow up no later than: 1 week  Orders given to  Homecare nurse/facility to recheck    Education provided: None required      Raj verbalizes understanding and agrees to warfarin dosing plan.    Instructed to call the Anticoagulation Clinic for any changes, questions or concerns. (#[phone])        Phoebe Cobian RN      OBJECTIVE:  Recent labs: (last 7 days)     20  1850 20   INR 2.19* 2.1*         INR assessment THER    Recheck INR In: 1 WEEK    INR Location Homecare INR      Anticoagulation Summary  As of 2020    INR goal:  2.0-3.0   TTR:  71.8 % (1 y)   INR used for dosin.1 (2020)   Warfarin maintenance plan:  2.5 mg (2.5 mg x 1) every day   Full warfarin instructions:  2.5 mg every day   Weekly warfarin total:  17.5 mg   Plan last modified:  Azul Whitaker RN (10/28/2020)   Next INR check:  2020   Priority:  Maintenance   Target end date:  Indefinite    Indications    Permanent atrial fibrillation (H) (Resolved) [I48.21]  Chronic atrial fibrillation (H) [I48.20]  Long term (current) use of  anticoagulants [Z79.01]             Anticoagulation Episode Summary     INR check location:      Preferred lab:  EXTERNAL LAB    Send INR reminders to:  NIKKI CHASE    Comments:  Home care       Anticoagulation Care Providers     Provider Role Specialty Phone number    Patti Suárez MD Referring Internal Medicine [phone]

## 2020-12-10 ENCOUNTER — PATIENT OUTREACH (OUTPATIENT)
Dept: UROLOGY | Facility: CLINIC | Age: 80
End: 2020-12-10

## 2020-12-10 NOTE — TELEPHONE ENCOUNTER
Patient called to update me she has a telephone appointment with Infectious Disease clinic next week  Adelaida Liu, RN   Care Coordinator Urology

## 2020-12-16 ENCOUNTER — ANTICOAGULATION THERAPY VISIT (OUTPATIENT)
Dept: INTERNAL MEDICINE | Facility: CLINIC | Age: 80
End: 2020-12-16

## 2020-12-16 ENCOUNTER — TRANSFERRED RECORDS (OUTPATIENT)
Dept: HEALTH INFORMATION MANAGEMENT | Facility: CLINIC | Age: 80
End: 2020-12-16

## 2020-12-16 DIAGNOSIS — Z79.01 LONG TERM (CURRENT) USE OF ANTICOAGULANTS: ICD-10-CM

## 2020-12-16 DIAGNOSIS — I48.20 CHRONIC ATRIAL FIBRILLATION (H): ICD-10-CM

## 2020-12-16 LAB — INR PPP: 2.7 (ref 0.9–1.1)

## 2020-12-18 ENCOUNTER — NURSE TRIAGE (OUTPATIENT)
Dept: NURSING | Facility: CLINIC | Age: 80
End: 2020-12-18

## 2020-12-18 DIAGNOSIS — T78.3XXA ANGIOEDEMA, INITIAL ENCOUNTER: ICD-10-CM

## 2020-12-19 NOTE — TELEPHONE ENCOUNTER
Clinic Action Needed: Yes, please call patient    Presenting Problem: Pt requesting an order for an EpiPen, states she just realized hers  and wants another one.  Pt does not remember what doctor ordered the EpiPen for her in the past.  She has tried contacting her pharmacies but can't find out.       If approved, she would like this sent in the mail as she has to call around to find out where she can get the best price.      Routed to: CLIFFORD Miller RN/MIKE        Reason for Disposition    [1] Caller requesting NON-URGENT health information AND [2] PCP's office is the best resource    Additional Information    Negative: RN needs further essential information from caller in order to complete triage    Negative: Requesting regular office appointment    Protocols used: INFORMATION ONLY CALL-A-

## 2020-12-21 ENCOUNTER — NURSE TRIAGE (OUTPATIENT)
Dept: INTERNAL MEDICINE | Facility: CLINIC | Age: 80
End: 2020-12-21

## 2020-12-21 DIAGNOSIS — R60.0 BILATERAL LEG EDEMA: Primary | ICD-10-CM

## 2020-12-21 RX ORDER — FUROSEMIDE 20 MG
20 TABLET ORAL DAILY
Qty: 10 TABLET | Refills: 0 | Status: SHIPPED | OUTPATIENT
Start: 2020-12-21 | End: 2021-04-20

## 2020-12-21 RX ORDER — EPINEPHRINE 0.3 MG/.3ML
0.3 INJECTION SUBCUTANEOUS
Qty: 1 EACH | Refills: 1 | Status: SHIPPED | OUTPATIENT
Start: 2020-12-21 | End: 2021-05-28

## 2020-12-21 NOTE — TELEPHONE ENCOUNTER
Left voice message for patient to call back for a message.     Please inform patient Dr. Lauren recommends an appointment for further evaluation. If appointment not available within 1-2 days, Dr. Lauren recommends the following until her appointment:  -- leg elevation  -- compression socks   -- low salt diet  -- daily weight  -- furosemide 20 mg daily for 7 -10 days (please get pharmacy information so RN can sign prescription)

## 2020-12-21 NOTE — TELEPHONE ENCOUNTER
It sounds like she needs to be seen in clinic. Below is more FYI     Does she weigh herself daily? Is she on any new medications. How long has she been sleeping with her feet down at night. Most people her age will get swollen legs if she do not sleep at night with their legs at the same level as their heart and kidneys.     If she is not in CHF then she needs elevation and compression stockings.

## 2020-12-21 NOTE — TELEPHONE ENCOUNTER
"Patient reports feet and leg swelling the last 2 days from feet to knees. Left foot is more swollen than the right, starts where toes begin on this foot. No increased in pain. She is not adding salt to her food. No additional symptoms and not interfering with her walking. She is elevating her feet when she is seated. She has not been elevating her legs at night. She notices a slight improvement in the mornings, but not much.     Nurse Triage Protocol recommends ER or UCC today, routed to covering provider for second level triage. Patient states she would prefer not to go to the ER (does not have transportation) and asks if a diuretic would be appropriate.     Additional Information    Negative: Chest pain    Negative: Small area of swelling and followed an insect bite to the area    Negative: Followed a knee injury    Negative: Ankle or foot injury    Negative: Pregnant with leg swelling or edema    Negative: Sounds like a life-threatening emergency to the triager    Negative: Difficulty breathing at rest    Negative: Entire foot is cool or blue in comparison to other side    Thigh, calf, or ankle swelling in both legs, but one side is definitely more swollen    Negative: Thigh, calf, or ankle swelling in only one leg    Negative: Thigh or calf pain and only 1 side and present > 1 hour    Negative: SEVERE swelling (e.g., swelling extends above knee, entire leg is swollen, weeping fluid)    Answer Assessment - Initial Assessment Questions  1. ONSET: \"When did the swelling start?\" (e.g., minutes, hours, days)      2 days ago  2. LOCATION: \"What part of the leg is swollen?\"  \"Are both legs swollen or just one leg?\"      Both legs, left is worse than right  3. SEVERITY: \"How bad is the swelling?\" (e.g., localized; mild, moderate, severe)   - Localized - small area of swelling localized to one leg   - MILD pedal edema - swelling limited to foot and ankle, pitting edema < 1/4 inch (6 mm) deep, rest and elevation eliminate " "most or all swelling   - MODERATE edema - swelling of lower leg to knee, pitting edema > 1/4 inch (6 mm) deep, rest and elevation only partially reduce swelling   - SEVERE edema - swelling extends above knee, facial or hand swelling present       Moderate  4. REDNESS: \"Does the swelling look red or infected?\"      no  5. PAIN: \"Is the swelling painful to touch?\" If so, ask: \"How painful is it?\"   (Scale 1-10; mild, moderate or severe)      No increase in pain, she has neuropathy  6. FEVER: \"Do you have a fever?\" If so, ask: \"What is it, how was it measured, and when did it start?\"       no  7. CAUSE: \"What do you think is causing the leg swelling?\"      unsure  8. MEDICAL HISTORY: \"Do you have a history of heart failure, kidney disease, liver failure, or cancer?\"      no  9. RECURRENT SYMPTOM: \"Have you had leg swelling before?\" If so, ask: \"When was the last time?\" \"What happened that time?\"      no  10. OTHER SYMPTOMS: \"Do you have any other symptoms?\" (e.g., chest pain, difficulty breathing)        no  11. PREGNANCY: \"Is there any chance you are pregnant?\" \"When was your last menstrual period?\"        n/a    Protocols used: LEG SWELLING AND EDEMA-A-OH      "

## 2020-12-21 NOTE — TELEPHONE ENCOUNTER
"Patient advised of MD response.  Patient unable to accept appointments offered this week, states she is busy the next 3 days.  \"Can't the doctor just order some Lasix\".  She states she already follows a low salt diet, she cannot put her compression stockings on and there is no one that can help her.  She does not have a scale so is unable to weigh herself and that she elevates her legs as much as she can tolerate.     Patient also went on to add that she has had vomiting and diarrhea for the past year and has lost 26 lb. in the past year \"because I am not absorbing my nutrients.  Tried to encourage patient to come in to clinic to be seen as RN has concerns about patient's electrolyte status adding Lasix to her meds for 7-10 days when she has been having vomiting and diarrhea.  Patient then said she has not had any in 4 days.     No appointment scheduled.  This RN does not feel comfortable sending Lasix rx on this patient and respectfully as that MD send it to Nae Graham R.N.          "

## 2020-12-21 NOTE — TELEPHONE ENCOUNTER
I agree with dr Isaac's assessment     The patient is on warfarin tx, so the risk of DVT is minimal.   It would be ideal if there is an opening in the clinic.   If no opening in 1-2 days I suggest the following until the appointment     -- leg elevation  -- compression socks   -- low salt diet  -- daily wt  -- furosemide 20 mg daily for 7 -10 days   -- RN please sign RX ( pharmacy not indicated in this message)

## 2020-12-22 NOTE — TELEPHONE ENCOUNTER
Left message on home voicemail asking patient to return call to clinic for message from Dr. Lauren:  1.  A 10 day rx for Lasix has been sent to her pharmacy  2.  Per MD, patient needs to see Dr. Lauren next available.  Please help her schedule an appointment.  BENITA Graham R.N.

## 2020-12-28 ENCOUNTER — ANTICOAGULATION THERAPY VISIT (OUTPATIENT)
Dept: INTERNAL MEDICINE | Facility: CLINIC | Age: 80
End: 2020-12-28

## 2020-12-28 DIAGNOSIS — Z79.01 LONG TERM (CURRENT) USE OF ANTICOAGULANTS: ICD-10-CM

## 2020-12-28 DIAGNOSIS — I48.20 CHRONIC ATRIAL FIBRILLATION (H): ICD-10-CM

## 2020-12-28 LAB — INR PPP: 3.5 (ref 0.9–1.1)

## 2020-12-28 NOTE — PROGRESS NOTES
ANTICOAGULATION MANAGEMENT     Patient Name:  Savanna Rehman  Date:  12/28/2020    ASSESSMENT /SUBJECTIVE:    Today's INR result of 3.5 is supratherapeutic. Goal INR of 2.0-3.0      Warfarin dose taken: Warfarin taken as instructed    Diet: No new diet changes affecting INR    Medication changes/ interactions: Increased Tylenol use x 4 days    Previous INR: Therapeutic     S/S of bleeding or thromboembolism: No    New injury or illness: Yes: Headaches    Upcoming surgery, procedure or cardioversion: No    Additional findings: None      PLAN:    Telephone call with home care nurse Raj regarding INR result and instructed:     Warfarin Dosing Instructions: Hold x 1 then continue your current warfarin dose of 2.5 mg daily    Instructed patient to follow up no later than: 1 week  Orders given to  Homecare nurse/facility to recheck    Education provided: Monitoring for bleeding signs and symptoms and Monitoring for clotting signs and symptoms      Raj Nurse verbalizes understanding and agrees to warfarin dosing plan.    Instructed to call the Anticoagulation Clinic for any changes, questions or concerns. (#[phone])        Azul Whitaker RN      OBJECTIVE:  Recent labs: (last 7 days)     12/28/20   INR 3.5*         No question data found.  Anticoagulation Summary  As of 12/28/2020    INR goal:  2.0-3.0   TTR:  69.8 % (1 y)   INR used for dosing:  3.5 (12/28/2020)   Warfarin maintenance plan:  2.5 mg (2.5 mg x 1) every day   Full warfarin instructions:  12/28: Hold; Otherwise 2.5 mg every day   Weekly warfarin total:  17.5 mg   Plan last modified:  Azul Whitaker RN (10/28/2020)   Next INR check:  1/6/2021   Priority:  Maintenance   Target end date:  Indefinite    Indications    Permanent atrial fibrillation (H) (Resolved) [I48.21]  Chronic atrial fibrillation (H) [I48.20]  Long term (current) use of anticoagulants [Z79.01]             Anticoagulation Episode Summary     INR check location:       Preferred lab:  EXTERNAL LAB    Send INR reminders to:  NIKKI CHASE    Comments:  Home care       Anticoagulation Care Providers     Provider Role Specialty Phone number    Patti Suárez MD Referring Internal Medicine [phone]

## 2021-01-06 ENCOUNTER — ANTICOAGULATION THERAPY VISIT (OUTPATIENT)
Dept: INTERNAL MEDICINE | Facility: CLINIC | Age: 81
End: 2021-01-06

## 2021-01-06 DIAGNOSIS — Z79.01 LONG TERM (CURRENT) USE OF ANTICOAGULANTS: ICD-10-CM

## 2021-01-06 DIAGNOSIS — I48.20 CHRONIC ATRIAL FIBRILLATION (H): ICD-10-CM

## 2021-01-06 LAB — INR PPP: 2.4 (ref 0.9–1.1)

## 2021-01-06 NOTE — PROGRESS NOTES
ANTICOAGULATION MANAGEMENT     Patient Name:  Savanna Rehman  Date:  2021    ASSESSMENT /SUBJECTIVE:    Today's INR result of 2.4 is therapeutic. Goal INR of 2.0-3.0      Warfarin dose taken: Warfarin taken as instructed    Diet: No new diet changes affecting INR    Medication changes/ interactions: No new medications/supplements affecting INR    Previous INR: Supratherapeutic     S/S of bleeding or thromboembolism: No    New injury or illness: No    Upcoming surgery, procedure or cardioversion: No    Additional findings: None      PLAN:    Telephone call with home care nurse Evelin regarding INR result and instructed:     Warfarin Dosing Instructions: Continue your current warfarin dose 2.5 mg daily    Instructed patient to follow up no later than: 2 weeks  Orders given to  Homecare nurse/facility to recheck    Education provided: Target INR goal and significance of current INR result      Evelin verbalizes understanding and agrees to warfarin dosing plan.    Instructed to call the Anticoagulation Clinic for any changes, questions or concerns. (#[phone])        Miko West RN      OBJECTIVE:  Recent labs: (last 7 days)     21   INR 2.4*         No question data found.  Anticoagulation Summary  As of 2021    INR goal:  2.0-3.0   TTR:  68.6 % (1 y)   INR used for dosin.4 (2021)   Warfarin maintenance plan:  2.5 mg (2.5 mg x 1) every day   Full warfarin instructions:  2.5 mg every day   Weekly warfarin total:  17.5 mg   No change documented:  Miko West RN   Plan last modified:  Azul Whitaker RN (10/28/2020)   Next INR check:  2021   Priority:  Maintenance   Target end date:  Indefinite    Indications    Permanent atrial fibrillation (H) (Resolved) [I48.21]  Chronic atrial fibrillation (H) [I48.20]  Long term (current) use of anticoagulants [Z79.01]             Anticoagulation Episode Summary     INR check location:      Preferred lab:  EXTERNAL LAB    Send INR  reminders to:  NIKKI CHASE    Comments:  Home care       Anticoagulation Care Providers     Provider Role Specialty Phone number    Patti Suárez MD Referring Internal Medicine [phone]

## 2021-01-20 ENCOUNTER — ANTICOAGULATION THERAPY VISIT (OUTPATIENT)
Dept: INTERNAL MEDICINE | Facility: CLINIC | Age: 81
End: 2021-01-20

## 2021-01-20 DIAGNOSIS — Z79.01 LONG TERM (CURRENT) USE OF ANTICOAGULANTS: ICD-10-CM

## 2021-01-20 DIAGNOSIS — I48.20 CHRONIC ATRIAL FIBRILLATION (H): ICD-10-CM

## 2021-01-20 LAB — INR PPP: 1.7 (ref 0.9–1.1)

## 2021-01-20 NOTE — PROGRESS NOTES
ANTICOAGULATION MANAGEMENT     Patient Name:  Savanna Rehman  Date:  2021    ASSESSMENT /SUBJECTIVE:    Today's INR result of 1.7 is subtherapeutic. Goal INR of 2.0-3.0      Warfarin dose taken: Warfarin taken as instructed    Diet: No new diet changes affecting INR    Medication changes/ interactions: No new medications/supplements affecting INR    Previous INR: Therapeutic     S/S of bleeding or thromboembolism: No    New injury or illness: No    Upcoming surgery, procedure or cardioversion: No    Additional findings: None      PLAN:    Telephone call with home care nurse Raj regarding INR result and instructed:     Warfarin Dosing Instructions: Take 5mg tonight then continue your current warfarin dose of 2.5mg daily     Instructed patient to follow up no later than: 1 week  Orders given to  Homecare nurse/facility to recheck    Education provided: Contact [facility] Anticoagulation: [phone]  with any changes, questions or concerns at       [place] verbalizes understanding and agrees to warfarin dosing plan.    Instructed to call the Anticoagulation Clinic for any changes, questions or concerns. (#[phone])        Sanjana Fox RN      OBJECTIVE:  Recent labs: (last 7 days)     21   INR 1.7*         No question data found.  Anticoagulation Summary  As of 2021    INR goal:  2.0-3.0   TTR:  67.0 % (1 y)   INR used for dosin.7 (2021)   Warfarin maintenance plan:  2.5 mg (2.5 mg x 1) every day   Full warfarin instructions:  : 5 mg; Otherwise 2.5 mg every day   Weekly warfarin total:  17.5 mg   Plan last modified:  Azul Whitaker RN (10/28/2020)   Next INR check:  2021   Priority:  Maintenance   Target end date:  Indefinite    Indications    Permanent atrial fibrillation (H) (Resolved) [I48.21]  Chronic atrial fibrillation (H) [I48.20]  Long term (current) use of anticoagulants [Z79.01]             Anticoagulation Episode Summary     INR check  location:      Preferred lab:  EXTERNAL LAB    Send INR reminders to:  NIKKI CHASE    Comments:  Home care       Anticoagulation Care Providers     Provider Role Specialty Phone number    Patti Suárez MD Referring Internal Medicine [phone]         Sanjana Green RN, BSN, PHN

## 2021-01-25 ENCOUNTER — TELEPHONE (OUTPATIENT)
Dept: INTERNAL MEDICINE | Facility: CLINIC | Age: 81
End: 2021-01-25

## 2021-01-27 ENCOUNTER — ANTICOAGULATION THERAPY VISIT (OUTPATIENT)
Dept: INTERNAL MEDICINE | Facility: CLINIC | Age: 81
End: 2021-01-27

## 2021-01-27 DIAGNOSIS — I48.20 CHRONIC ATRIAL FIBRILLATION (H): ICD-10-CM

## 2021-01-27 DIAGNOSIS — Z79.01 LONG TERM (CURRENT) USE OF ANTICOAGULANTS: ICD-10-CM

## 2021-01-27 LAB — INR PPP: 1.8 (ref 0.9–1.1)

## 2021-01-27 NOTE — TELEPHONE ENCOUNTER
Please clarify  --Is this a new order for recertification?  --Who ordered home health care  --Why it was ordered  --Did she have a face-to-face appointment

## 2021-01-27 NOTE — PROGRESS NOTES
ANTICOAGULATION MANAGEMENT     Patient Name:  Savanna Rehman  Date:  2021    ASSESSMENT /SUBJECTIVE:    Today's INR result of 1.8 is therapeutic. Goal INR of 2.0-3.0      Warfarin dose taken: Warfarin taken as instructed    Diet: No new diet changes affecting INR    Medication changes/ interactions: No new medications/supplements affecting INR    Previous INR: Subtherapeutic     S/S of bleeding or thromboembolism: No    New injury or illness: No    Upcoming surgery, procedure or cardioversion: No    Additional findings: None      PLAN:    Telephone call with home care nurse Fannie, regarding INR result and instructed:     Warfarin Dosing Instructions: Continue your current warfarin dose 5 mg on wed and 2.5 mg all other days    Instructed patient to follow up no later than: 1 week  Orders given to  Homecare nurse/facility to recheck    Education provided: None required      Fannie home care, verbalizes understanding and agrees to warfarin dosing plan.    Instructed to call the Anticoagulation Clinic for any changes, questions or concerns. (#[phone])        Iris Kemp RN      OBJECTIVE:  Recent labs: (last 7 days)     21   INR 1.8*         No question data found.  Anticoagulation Summary  As of 2021    INR goal:  2.0-3.0   TTR:  65.1 % (1 y)   INR used for dosin.8 (2021)   Warfarin maintenance plan:  2.5 mg (2.5 mg x 1) every day   Full warfarin instructions:  2.5 mg every day   Weekly warfarin total:  17.5 mg   Plan last modified:  Azul Whitaker RN (10/28/2020)   Next INR check:     Priority:  Maintenance   Target end date:  Indefinite    Indications    Permanent atrial fibrillation (H) (Resolved) [I48.21]  Chronic atrial fibrillation (H) [I48.20]  Long term (current) use of anticoagulants [Z79.01]             Anticoagulation Episode Summary     INR check location:      Preferred lab:  EXTERNAL LAB    Send INR reminders to:  NIKKI CHASE    Comments:  Home care        Anticoagulation Care Providers     Provider Role Specialty Phone number    Patti Suárez MD Referring Internal Medicine [phone]

## 2021-01-27 NOTE — TELEPHONE ENCOUNTER
Called patient and she stated that she has had home care for the past 4 years.  Primary care provider has been signing the re certification for the past year, at least.  Patient stated that a Dr. Armijo(?)  ordered it originally.  She stated that the nurse has been getting her INRs, setting up her meds and doing her foot care.  Nurse has been reporting the results to primary care provider.  Patient stated she is unable to do without the home care. Please advise, thanks.

## 2021-01-28 DIAGNOSIS — Z53.9 DIAGNOSIS NOT YET DEFINED: Primary | ICD-10-CM

## 2021-01-28 DIAGNOSIS — N39.0 URINARY TRACT INFECTION WITHOUT HEMATURIA, SITE UNSPECIFIED: ICD-10-CM

## 2021-01-28 LAB
ALBUMIN UR-MCNC: NEGATIVE MG/DL
APPEARANCE UR: ABNORMAL
BACTERIA #/AREA URNS HPF: ABNORMAL /HPF
BILIRUB UR QL STRIP: NEGATIVE
COLOR UR AUTO: YELLOW
GLUCOSE UR STRIP-MCNC: NEGATIVE MG/DL
HGB UR QL STRIP: ABNORMAL
KETONES UR STRIP-MCNC: NEGATIVE MG/DL
LEUKOCYTE ESTERASE UR QL STRIP: ABNORMAL
MUCOUS THREADS #/AREA URNS LPF: PRESENT /LPF
NITRATE UR QL: POSITIVE
NON-SQ EPI CELLS #/AREA URNS LPF: ABNORMAL /LPF
PH UR STRIP: 5.5 PH (ref 5–7)
RBC #/AREA URNS AUTO: ABNORMAL /HPF
SOURCE: ABNORMAL
SP GR UR STRIP: 1.02 (ref 1–1.03)
UROBILINOGEN UR STRIP-ACNC: 0.2 EU/DL (ref 0.2–1)
WBC #/AREA URNS AUTO: ABNORMAL /HPF
WBC CLUMPS #/AREA URNS HPF: PRESENT /HPF

## 2021-01-28 PROCEDURE — 87088 URINE BACTERIA CULTURE: CPT | Performed by: UROLOGY

## 2021-01-28 PROCEDURE — 87086 URINE CULTURE/COLONY COUNT: CPT | Performed by: UROLOGY

## 2021-01-28 PROCEDURE — 81001 URINALYSIS AUTO W/SCOPE: CPT | Performed by: UROLOGY

## 2021-01-28 PROCEDURE — G0179 MD RECERTIFICATION HHA PT: HCPCS | Performed by: INTERNAL MEDICINE

## 2021-01-28 PROCEDURE — 87186 SC STD MICRODIL/AGAR DIL: CPT | Performed by: UROLOGY

## 2021-01-30 LAB
BACTERIA SPEC CULT: ABNORMAL
SPECIMEN SOURCE: ABNORMAL

## 2021-01-31 ENCOUNTER — NURSE TRIAGE (OUTPATIENT)
Dept: NURSING | Facility: CLINIC | Age: 81
End: 2021-01-31

## 2021-01-31 NOTE — TELEPHONE ENCOUNTER
S: UC results.  B: Went in for UA/UC on 1/28.  Calling today for UC results.  Was given RX for Cefdinir 300 mg x5 days.  Writer called [facility] and spoke to [place] pharmacist and he reviewed the UC sensitivities .  A: Felt the prescribed antibiotic Cefdinir will work for this patient taking into consideration her extensive allergie list.    S:  Writer call Savanna told her continue to take Cefdinir.     Lucy Sanchez RN MAN   Triage Nurse Advisor [facility]      Reason for Disposition    Caller requesting lab results    Additional Information    Lab result questions    Protocols used: PCP CALL - NO TRIAGE-A-AH, INFORMATION ONLY CALL-A-AH      COVID 19 Nurse Triage Plan/Patient Instructions    Please be aware that novel coronavirus (COVID-19) may be circulating in the community. If you develop symptoms such as fever, cough, or SOB or if you have concerns about the presence of another infection including coronavirus (COVID-19), please contact your health care provider or visit [email or web address].     Disposition/Instructions    Home care recommended. Follow home care protocol based instructions.    Thank you for taking steps to prevent the spread of this virus.  o Limit your contact with others.  o Wear a simple mask to cover your cough.  o Wash your hands well and often.    Resources    [facility]: About COVID-19: [email or web address]    CDC: What to Do If You're Sick: www.cdc.gov/coronavirus/2019-ncov/about/steps-when-sick.html    CDC: Ending Home Isolation: www.cdc.gov/coronavirus/2019-ncov/hcp/disposition-in-home-patients.html     CDC: Caring for Someone: www.cdc.gov/coronavirus/2019-ncov/if-you-are-sick/care-for-someone.html     [facility]: Interim Guidance for Hospital Discharge to Home: www.health.[facility].[place].us/diseases/coronavirus/hcp/hospdischarge.pdf    [facility] clinical trials (COVID-19 research studies): clinicalaffairs.[facility]/n-clinical-trials     Below are the  COVID-19 hotlines at the [place] [facility] ([facility]). Interpreters are available.   o For health questions: Call 224-411-9645 or 1-516.710.1779 (7 a.m. to 7 p.m.)  o For questions about schools and childcare: Call 219-309-0422 or 1-389.914.3408 (7 a.m. to 7 p.m.)

## 2021-02-01 ENCOUNTER — TELEPHONE (OUTPATIENT)
Dept: INTERNAL MEDICINE | Facility: CLINIC | Age: 81
End: 2021-02-01

## 2021-02-01 DIAGNOSIS — I48.20 CHRONIC ATRIAL FIBRILLATION (H): ICD-10-CM

## 2021-02-01 DIAGNOSIS — Z79.01 LONG TERM (CURRENT) USE OF ANTICOAGULANTS: ICD-10-CM

## 2021-02-01 NOTE — TELEPHONE ENCOUNTER
Incoming call from Gianluca home care RN ([phone]). She states that the pt was started on cefdinir on 01/29 and will take through 02/02. Originally, INR recheck was scheduled for 02/03. This writer advised INR to be done 02/02 as cefdinir can cause elevated INR and increase risk of bleeding.

## 2021-02-02 ENCOUNTER — ANTICOAGULATION THERAPY VISIT (OUTPATIENT)
Dept: INTERNAL MEDICINE | Facility: CLINIC | Age: 81
End: 2021-02-02

## 2021-02-02 DIAGNOSIS — Z79.01 LONG TERM (CURRENT) USE OF ANTICOAGULANTS: ICD-10-CM

## 2021-02-02 DIAGNOSIS — I48.20 CHRONIC ATRIAL FIBRILLATION (H): ICD-10-CM

## 2021-02-02 LAB — INR PPP: 2.5 (ref 0.9–1.1)

## 2021-02-02 NOTE — PROGRESS NOTES
ANTICOAGULATION MANAGEMENT     Patient Name:  Savanna Rehman  Date:  2021    ASSESSMENT /SUBJECTIVE:    Today's INR result of 2.5 is therapeutic. Goal INR of 2.0-3.0      Warfarin dose taken: Warfarin taken as instructed    Diet: No new diet changes affecting INR    Medication changes/ interactions: Cefdinir -    Previous INR: Subtherapeutic     S/S of bleeding or thromboembolism: No    New injury or illness: Yes: mild abd discomfort which pt feels is due to UTI    Upcoming surgery, procedure or cardioversion: No    Additional findings: None      PLAN:    Telephone call with home care nurse Evelin regarding INR result and instructed:     Warfarin Dosing Instructions: Continue your current warfarin dose 5mg every Wed; 2.5mg all other days    Instructed patient to follow up no later than: 1 week  Orders given to  Homecare nurse/facility to recheck    Education provided: Target INR goal and significance of current INR result and Monitoring for bleeding signs and symptoms      Evelin verbalizes understanding and agrees to warfarin dosing plan.    Instructed to call the Anticoagulation Clinic for any changes, questions or concerns. (#[phone])        Miko West RN      OBJECTIVE:  Recent labs: (last 7 days)     21   INR 1.8* 2.5*         No question data found.  Anticoagulation Summary  As of 2021    INR goal:  2.0-3.0   TTR:  64.6 % (1 y)   INR used for dosin.5 (2021)   Warfarin maintenance plan:  5 mg (2.5 mg x 2) every Wed; 2.5 mg (2.5 mg x 1) all other days   Full warfarin instructions:  5 mg every Wed; 2.5 mg all other days   Weekly warfarin total:  20 mg   No change documented:  Miko West RN   Plan last modified:  Iris Kemp RN (2021)   Next INR check:  2021   Priority:  Maintenance   Target end date:  Indefinite    Indications    Permanent atrial fibrillation (H) (Resolved) [I48.21]  Chronic atrial fibrillation (H) [I48.20]  Long term  (current) use of anticoagulants [Z79.01]             Anticoagulation Episode Summary     INR check location:      Preferred lab:  EXTERNAL LAB    Send INR reminders to:  NIKKI CHASE    Comments:  Home care       Anticoagulation Care Providers     Provider Role Specialty Phone number    Patti Suárez MD Referring Internal Medicine [phone]

## 2021-02-02 NOTE — PROGRESS NOTES
Patient may be starting Bactrim, advised that when taking with Warfarin that can increase her INR. Advised to have INR checked 3-4 days after starting. If she doesn't start until Thursday her already scheduled INR check for Monday is okay. No adjustment needed at this time for warfarin dose.    Deborah Zepeda RN - [facility]

## 2021-02-05 DIAGNOSIS — E11.42 DIABETIC POLYNEUROPATHY ASSOCIATED WITH TYPE 2 DIABETES MELLITUS (H): ICD-10-CM

## 2021-02-05 RX ORDER — BLOOD SUGAR DIAGNOSTIC
STRIP MISCELLANEOUS
Qty: 100 STRIP | Refills: 1 | Status: SHIPPED | OUTPATIENT
Start: 2021-02-05 | End: 2021-02-22

## 2021-02-05 NOTE — TELEPHONE ENCOUNTER
Pending Prescriptions:                       Disp   Refills    ONETOUCH ULTRA test strip [Pharmacy Med N*100 st*1            Sig: USE 1 STRIP TO CHECK GLUCOSE ONCE DAILY    Prescription approved per [facility] Refill Protocol.

## 2021-02-06 ENCOUNTER — TELEPHONE (OUTPATIENT)
Dept: MULTI SPECIALTY CLINIC | Facility: CLINIC | Age: 81
End: 2021-02-06

## 2021-02-06 DIAGNOSIS — N30.00 ACUTE CYSTITIS WITHOUT HEMATURIA: ICD-10-CM

## 2021-02-06 DIAGNOSIS — N30.00 ACUTE CYSTITIS WITHOUT HEMATURIA: Primary | ICD-10-CM

## 2021-02-06 RX ORDER — GRANULES FOR ORAL 3 G/1
3 POWDER ORAL ONCE
Qty: 1 PACKET | Refills: 0 | Status: SHIPPED | OUTPATIENT
Start: 2021-02-06 | End: 2021-02-06

## 2021-02-06 NOTE — TELEPHONE ENCOUNTER
Patient had been started on bactrim yest for Enterobacter UTI; now calling as she is feeling dizzy on bactrim (has 20+ allergy list). Still having bladder pressure. Will rx fosfomycin x 1 and have Dr. May/Darwin follow up Monday.     Azra Villasenor MD on 2/6/2021 at 11:56 AM

## 2021-02-08 ENCOUNTER — ANTICOAGULATION THERAPY VISIT (OUTPATIENT)
Dept: INTERNAL MEDICINE | Facility: CLINIC | Age: 81
End: 2021-02-08

## 2021-02-08 ENCOUNTER — TELEPHONE (OUTPATIENT)
Dept: INTERNAL MEDICINE | Facility: CLINIC | Age: 81
End: 2021-02-08

## 2021-02-08 DIAGNOSIS — I48.20 CHRONIC ATRIAL FIBRILLATION (H): ICD-10-CM

## 2021-02-08 DIAGNOSIS — Z79.01 LONG TERM (CURRENT) USE OF ANTICOAGULANTS: ICD-10-CM

## 2021-02-08 LAB — INR PPP: 2.5 (ref 0.9–1.1)

## 2021-02-08 NOTE — PROGRESS NOTES
ANTICOAGULATION MANAGEMENT     Patient Name:  Savanna Rehman  Date:  2021    ASSESSMENT /SUBJECTIVE:    Today's INR result of 2.5 is therapeutic. Goal INR of 2.0-3.0      Warfarin dose taken: Warfarin taken as instructed    Diet: No new diet changes affecting INR    Medication changes/ interactions: No new medications/supplements affecting INR. Completed Cefdinir . Took a few doses of bactrim over the weekend but discontinued it early.    Previous INR: Therapeutic     S/S of bleeding or thromboembolism: No    New injury or illness: Feels the UTI is ongoing    Upcoming surgery, procedure or cardioversion: No    Additional findings: None      PLAN:    Telephone call with home care nurse Evelin regarding INR result and instructed:     Warfarin Dosing Instructions: Continue your current warfarin dose 5mg every Wed; 2.5mg all other days    Instructed patient to follow up no later than: 1 week  Orders given to  Homecare nurse/facility to recheck    Education provided: Target INR goal and significance of current INR result      Evelin verbalizes understanding and agrees to warfarin dosing plan.    Instructed to call the Anticoagulation Clinic for any changes, questions or concerns. (#[phone])        Miko West RN      OBJECTIVE:  Recent labs: (last 7 days)     21   INR 2.5* 2.5*         No question data found.  Anticoagulation Summary  As of 2021    INR goal:  2.0-3.0   TTR:  64.6 % (1 y)   INR used for dosin.5 (2021)   Warfarin maintenance plan:  5 mg (2.5 mg x 2) every Wed; 2.5 mg (2.5 mg x 1) all other days   Full warfarin instructions:  5 mg every Wed; 2.5 mg all other days   Weekly warfarin total:  20 mg   No change documented:  Miko West RN   Plan last modified:  Iris Kemp RN (2021)   Next INR check:  2021   Priority:  Maintenance   Target end date:  Indefinite    Indications    Permanent atrial fibrillation (H) (Resolved) [I48.21]  Chronic  atrial fibrillation (H) [I48.20]  Long term (current) use of anticoagulants [Z79.01]             Anticoagulation Episode Summary     INR check location:      Preferred lab:  EXTERNAL LAB    Send INR reminders to:  NIKKI CHASE    Comments:  Home care       Anticoagulation Care Providers     Provider Role Specialty Phone number    Patti Suárez MD Referring Internal Medicine [phone]

## 2021-02-10 ENCOUNTER — NURSE TRIAGE (OUTPATIENT)
Dept: NURSING | Facility: CLINIC | Age: 81
End: 2021-02-10

## 2021-02-11 ENCOUNTER — OFFICE VISIT (OUTPATIENT)
Dept: INTERNAL MEDICINE | Facility: CLINIC | Age: 81
End: 2021-02-11
Payer: COMMERCIAL

## 2021-02-11 ENCOUNTER — ANCILLARY PROCEDURE (OUTPATIENT)
Dept: GENERAL RADIOLOGY | Facility: CLINIC | Age: 81
End: 2021-02-11
Attending: NURSE PRACTITIONER
Payer: COMMERCIAL

## 2021-02-11 ENCOUNTER — TELEPHONE (OUTPATIENT)
Dept: INTERNAL MEDICINE | Facility: CLINIC | Age: 81
End: 2021-02-11

## 2021-02-11 VITALS
RESPIRATION RATE: 16 BRPM | HEIGHT: 68 IN | OXYGEN SATURATION: 94 % | TEMPERATURE: 97.2 F | SYSTOLIC BLOOD PRESSURE: 126 MMHG | HEART RATE: 55 BPM | WEIGHT: 262.6 LBS | DIASTOLIC BLOOD PRESSURE: 86 MMHG | BODY MASS INDEX: 39.8 KG/M2

## 2021-02-11 DIAGNOSIS — I48.20 CHRONIC ATRIAL FIBRILLATION (H): Primary | ICD-10-CM

## 2021-02-11 DIAGNOSIS — T87.89: ICD-10-CM

## 2021-02-11 DIAGNOSIS — I48.20 CHRONIC ATRIAL FIBRILLATION (H): ICD-10-CM

## 2021-02-11 DIAGNOSIS — I82.622 ACUTE EMBOLISM AND THROMBOSIS OF DEEP VEINS OF LEFT UPPER EXTREMITY (H): ICD-10-CM

## 2021-02-11 PROCEDURE — 71046 X-RAY EXAM CHEST 2 VIEWS: CPT | Performed by: RADIOLOGY

## 2021-02-11 PROCEDURE — 99213 OFFICE O/P EST LOW 20 MIN: CPT | Performed by: NURSE PRACTITIONER

## 2021-02-11 RX ORDER — GRANULES FOR ORAL 3 G/1
3 POWDER ORAL ONCE
COMMUNITY
End: 2022-02-02

## 2021-02-11 ASSESSMENT — MIFFLIN-ST. JEOR: SCORE: 1719.65

## 2021-02-11 NOTE — NURSING NOTE
"Possible cellulitis in left arm.  Vital signs:  Temp: 97.2  F (36.2  C) Temp src: Oral BP: 126/86 Pulse: 55   Resp: 16 SpO2: 94 %     Height: 172.7 cm (5' 8\") Weight: 119.1 kg (262 lb 9.6 oz)  Estimated body mass index is 39.93 kg/m  as calculated from the following:    Height as of this encounter: 1.727 m (5' 8\").    Weight as of this encounter: 119.1 kg (262 lb 9.6 oz).          "

## 2021-02-11 NOTE — PROGRESS NOTES
"    Assessment & Plan       Chronic atrial fibrillation (H)    - XR Chest 2 Views; Future  - US Extremity Upper Venous  lt; Future    Acute embolism and thrombosis of deep veins of left upper extremity (H)     - US Extremity Upper Venous  lt; Future    Xray and US pending  Warm compresses to arm  CPAP w/ O2 as directed.         F/u cardiology for A fib and warfarin    Lin Up NP  [facility] SHELLY Corrales is a 80 year old who presents for the following health issues     HPI       Concern - swelling LUE  Onset: a few days  Description: increasing swelling and tenderness LUE  Intensity: mild  Progression of Symptoms:  worsening  Accompanying Signs & Symptoms: afebrile, denies SOB   Previous history of similar problem: no  Precipitating factors:        Worsened by: none  Alleviating factors:        Improved by:  Therapies tried and outcome:  none   H/o A fib, INR variable, has not been using CPAP w/ O2         Review of Systems   CONSTITUTIONAL: NEGATIVE for fever, chills, change in weight  ENT/MOUTH: NEGATIVE for ear, mouth and throat problems  RESP: NEGATIVE for significant cough or SOB  CV: NEGATIVE for chest pain, palpitations or peripheral edema      Objective    /86 (BP Location: Right arm, Patient Position: Sitting, Cuff Size: Adult Regular)   Pulse 55   Temp 97.2  F (36.2  C) (Oral)   Resp 16   Ht 1.727 m (5' 8\")   Wt 119.1 kg (262 lb 9.6 oz)   LMP  (LMP Unknown)   SpO2 94%   BMI 39.93 kg/m    Body mass index is 39.93 kg/m .  Physical Exam   GENERAL: alert, no distress, obese and elderly  RESP: lungs clear to auscultation - no rales, rhonchi or wheezes  CV: regular rate and rhythm, normal S1 S2, no S3 or S4, no murmur, click or rub, no peripheral edema and peripheral pulses strong  MS: LUE soft tissue mass mid biceps, mild tenderness, not warm to touch.                "

## 2021-02-11 NOTE — TELEPHONE ENCOUNTER
Patient calls again regarding her arm symptoms.  Appointment that she has scheduled is for 2/16.  Patient continues to have symptoms as listed in initial call.  No fever.  Assisted to schedule visit for today.  Blela Arechiga RN

## 2021-02-11 NOTE — TELEPHONE ENCOUNTER
Patient calling - says she has a pink spot on her arm that has been growing over the last couple months.  She says it started out the size of a pen tip and is now the size of 2 tennis balls.    No fever.    Triaged to disposition of See Provider Within 4 Hours.  Patient declines - says she is not going anywhere today.  Transferred to scheduling to set up appointment for tomorrow.    Tammy Hilliard, RN  Triage Nurse Advisor      Additional Information    Negative: [1] Sudden onset of rash (within last 2 hours) AND [2] difficulty with breathing or swallowing    Negative: Sounds like a life-threatening emergency to the triager    Negative: Poison ivy, oak, or sumac contact suspected    Negative: Insect bite(s) suspected    Negative: Ringworm suspected (i.e., round pink patch, sometimes looks like ring, usually 1/2 to 1 inch [12-25 mm],  in size, slowly increasing in size)    Negative: Athlete's Foot suspected (i.e., itchy rash between the toes)    Negative: Jock Itch suspected (i.e., itchy rash on inner thighs near genital area)    Negative: Wound infection suspected (i.e., pain, spreading redness, or pus; in a cut, puncture, scrape or sutured wound)    Negative: Impetigo suspected  (i.e., painless infected superficial small sores, less than 1 inch or 2.5 cm, often covered by a soft, yellow-brown scab or crust; sometimes occurring near nasal openings)    Negative: Shingles suspected (i.e., painful rash, multiple small blisters grouped together in one area of body; dermatomal distribution)    Negative: Rash of external female genital area (vulva)    Negative: Rash of penis or scrotum    Negative: Small spot, skin growth, or mole    Negative: Sores or skin ulcer, not a rash    Negative: Localized lump (or swelling) without redness or rash    Negative: [1] Localized purple or blood-colored spots or dots AND [2] not from injury or friction AND [3] fever    Negative: Patient sounds very sick or weak to the triager    [1]  Looks infected (spreading redness, pus) AND [2] large red area (> 2 in. or 5 cm)    Negative: [1] Red area or streak AND [2] fever    Negative: [1] Rash is painful to touch AND [2] fever    Protocols used: RASH OR REDNESS - [id number]

## 2021-02-12 ENCOUNTER — TELEPHONE (OUTPATIENT)
Dept: INTERNAL MEDICINE | Facility: CLINIC | Age: 81
End: 2021-02-12

## 2021-02-12 NOTE — TELEPHONE ENCOUNTER
Call received from patient. She had an appointment for an ultrasound today. States she had a  all lined up but she ate some soup before leaving for the appointment and got terrible diarrhea. States she is not able to come for the appointment. Patient states Lin is going be mad because she told her not to wait until Monday but states she was just too sick to come today. Ultrasound is rescheduled for Monday. Patient states if her arm gets worse she will go into the ER.

## 2021-02-13 ENCOUNTER — NURSE TRIAGE (OUTPATIENT)
Dept: NURSING | Facility: CLINIC | Age: 81
End: 2021-02-13

## 2021-02-14 NOTE — TELEPHONE ENCOUNTER
Caller is requesting results of  CXR;   review of EMR;  Lin Up, LAVINIA         2/11/21 3:46 PM  Note     Please advise pt CXR normal.  Schedule LUE US as planned.  Lin Up CNP           Caller denies that her  arm is any worse and reports that she is getting better from UTI.  Will have arm U/S on Monday  Advised to call for any further problems   Karina Hitchcock RN  FNA         Additional Information    [1] Follow-up call to recent contact AND [2] information only call, no triage required    Protocols used: INFORMATION ONLY CALL-A-

## 2021-02-15 ENCOUNTER — TELEPHONE (OUTPATIENT)
Dept: INTERNAL MEDICINE | Facility: CLINIC | Age: 81
End: 2021-02-15

## 2021-02-15 ENCOUNTER — HOSPITAL ENCOUNTER (OUTPATIENT)
Dept: ULTRASOUND IMAGING | Facility: CLINIC | Age: 81
Discharge: HOME | End: 2021-02-15
Attending: NURSE PRACTITIONER | Admitting: NURSE PRACTITIONER
Payer: COMMERCIAL

## 2021-02-15 DIAGNOSIS — I82.622 ACUTE EMBOLISM AND THROMBOSIS OF DEEP VEINS OF LEFT UPPER EXTREMITY (H): ICD-10-CM

## 2021-02-15 DIAGNOSIS — I48.20 CHRONIC ATRIAL FIBRILLATION (H): ICD-10-CM

## 2021-02-15 PROCEDURE — 93971 EXTREMITY STUDY: CPT | Mod: LT

## 2021-02-16 NOTE — TELEPHONE ENCOUNTER
Patient called back, given message from provider. Please call her back and advise what the next step is. [phone] (home)

## 2021-02-16 NOTE — TELEPHONE ENCOUNTER
PT states she still has swelling in arm, golf ball size in elbow.   It is the crook of her arm, and some swellng in her upper arm. Elbow area is pink. Not hot.     US was negative for DVT.   She is asking for next step.     She took Fosfomycin from Infectious disease for her UTI.     Please advise. Lin is out today. Will route to Dr Lauren.   May be bursitis?     INR   Date Value Ref Range Status   02/08/2021 2.5 (A) 0.90 - 1.10 Final

## 2021-02-17 ENCOUNTER — ANTICOAGULATION THERAPY VISIT (OUTPATIENT)
Dept: INTERNAL MEDICINE | Facility: CLINIC | Age: 81
End: 2021-02-17

## 2021-02-17 ENCOUNTER — VIRTUAL VISIT (OUTPATIENT)
Dept: INTERNAL MEDICINE | Facility: CLINIC | Age: 81
End: 2021-02-17
Payer: COMMERCIAL

## 2021-02-17 DIAGNOSIS — N32.81 URGENCY-FREQUENCY SYNDROME: ICD-10-CM

## 2021-02-17 DIAGNOSIS — Z79.01 LONG TERM (CURRENT) USE OF ANTICOAGULANTS: ICD-10-CM

## 2021-02-17 DIAGNOSIS — R42 DIZZINESS: ICD-10-CM

## 2021-02-17 DIAGNOSIS — N30.00 ACUTE CYSTITIS WITHOUT HEMATURIA: Primary | ICD-10-CM

## 2021-02-17 DIAGNOSIS — I48.20 CHRONIC ATRIAL FIBRILLATION (H): ICD-10-CM

## 2021-02-17 DIAGNOSIS — M25.529 ELBOW PAIN, UNSPECIFIED LATERALITY: ICD-10-CM

## 2021-02-17 DIAGNOSIS — N39.0 URINARY TRACT INFECTION, SITE NOT SPECIFIED: Primary | ICD-10-CM

## 2021-02-17 LAB — INR PPP: 2.8 (ref 0.9–1.1)

## 2021-02-17 PROCEDURE — 99442 PR PHYSICIAN TELEPHONE EVALUATION 11-20 MIN: CPT | Mod: 95 | Performed by: INTERNAL MEDICINE

## 2021-02-17 ASSESSMENT — PATIENT HEALTH QUESTIONNAIRE - PHQ9: SUM OF ALL RESPONSES TO PHQ QUESTIONS 1-9: 4

## 2021-02-17 NOTE — PROGRESS NOTES
Savanna is a 80 year old who is being evaluated via a billable telephone visit.      What phone number would you like to be contacted at? [phone]  How would you like to obtain your AVS? Mail a copy    07:53 --       This is a telephone encounter with the patient.       07:53 --- 08: 05          Dr Lauren's note        ASSESSMENT & PLAN:                                                      (N30.00) Acute cystitis without hematuria  (primary encounter diagnosis)  Comment: ongoing   Plan: f/u w urology     (M25.529) Elbow pain, unspecified laterality  Comment:   Plan: keep appointment w Lin Up tomorrow     (R42) Dizziness  Comment:   Plan: appointment w Dr Lauren Monday Feb 22         Chief complaint:                                                      Few things     SUBJECTIVE:                                                    History of present illness:    Bladder infection  -- urology referred her to ID because multiple drug allergy  -- takes phosphomycin  -- the instructions indicates that if no better in 4 days she needs to call     Lump on the elbow    -- neg US   -- neg CXR  -- she has a follow up appointment tomorrow with Lin Up    Dizziness is much worse  -- started 6 months            OBJECTIVE:           An actual physical exam can't be done during phone visit       PMHx: reviewed  Past Medical History:   Diagnosis Date     Anemia     Iron Deficiency anemia     Atrial fibrillation (H)      CAD (coronary artery disease)     non-obstructive     Chronic pain     neck, low back, legs     Congestive heart failure (H)      Degenerative disk disease      Fibromyalgia      Gastro-oesophageal reflux disease      Gout      Hiatal hernia      Mumps      Neuropathy      Palpitations      Pernicious anemia      Sleep apnea     uses CPAP.     Urinary incontinence      Vitamin D deficiency       PSHx: reviewed  Past Surgical History:   Procedure Laterality Date     APPENDECTOMY       CHOLECYSTECTOMY        COLONOSCOPY  3/15/2011     CORONARY ANGIOGRAPHY ADULT ORDER       CYSTOSCOPY, BIOPSY BLADDER, COMBINED N/A 7/13/2020    Procedure: CYSTOSCOPY, WITH BLADDER BIOPSY;  Surgeon: Deisy Wilson MD;  Location: [facility] OR     HEART CATH LEFT HEART CATH  12/30/16    medication management     HYSTERECTOMY TOTAL ABDOMINAL       Knee replacement NOS Left      LAPAROSCOPIC NISSEN FUNDOPLICATION N/A 2/4/2015    Procedure: LAPAROSCOPIC NISSEN FUNDOPLICATION;  Surgeon: Armando Ansari MD;  Location: [facility] OR     TONSILLECTOMY       TRANSPOSITION ULNAR NERVE (ELBOW)          Meds: reviewed  Current Outpatient Medications   Medication Sig Dispense Refill     ACE/ARB/ARNI NOT PRESCRIBED (INTENTIONAL) Please choose reason not prescribed, below       alcohol swab prep pads Use to swab area of injection/chandrakant as directed. 100 each 3     B Complex-C (SUPER B COMPLEX PO) Take 1 tablet by mouth At Bedtime       balsalazide (COLAZAL) 750 MG capsule Take 2,250 mg by mouth 3 times daily       BETA BLOCKER NOT PRESCRIBED (INTENTIONAL) Beta Blocker not prescribed intentionally due to Hypotension (SBP < 90)       Biotin 5000 MCG TABS Take 5,000 mcg by mouth At Bedtime       blood glucose calibration (NO BRAND SPECIFIED) solution To accompany: Blood Glucose Monitor Brands: per insurance. 1 Bottle 3     blood glucose monitoring (NO BRAND SPECIFIED) meter device kit Use to test blood sugar 1 times daily. Preferred blood glucose meter OR supplies to accompany: Blood Glucose Monitor Brands: per insurance. 1 kit 0     butalbital-aspirin-caffeine (FIORINAL) -40 MG capsule Take 1 capsule by mouth every 6 hours as needed for headaches 30 capsule 0     cholecalciferol (VITAMIN D3) 5000 units (125 mcg) capsule Take 5,000 Units by mouth At Bedtime       EPINEPHrine (EPIPEN 2-ROBBY) 0.3 MG/0.3ML injection 2-pack Inject 0.3 mLs (0.3 mg) into the muscle once as needed for anaphylaxis 1 each 1     fosfomycin (MONUROL) 3 g Packet Take 3 g by mouth once        furosemide (LASIX) 20 MG tablet Take 1 tablet (20 mg) by mouth daily 10 tablet 0     glipiZIDE (GLUCOTROL XL) 2.5 MG 24 hr tablet Take 1 tablet (2.5 mg) by mouth daily 90 tablet 1     nystatin (MYCOSTATIN) 513559 UNIT/GM external powder Apply to bilateral groin area 2x daily as needed. 30 g 1     ONETOUCH ULTRA test strip USE 1 STRIP TO CHECK GLUCOSE ONCE DAILY 100 strip 1     order for DME Oxygen 2 Li/min  at night bleed with CPAP       oxybutynin (DITROPAN) 5 MG tablet TAKE 1 TABLET TWICE A DAY 90 tablet 1     thin (NO BRAND SPECIFIED) lancets Use with lanceting device to check glucose once a day. To accompany: Blood Glucose Monitor Brands: per insurance. 100 each 3     warfarin ANTICOAGULANT (COUMADIN) 2.5 MG tablet TAKE AS INSTRUCTED BY YOUR PRESCRIBER 126 tablet 3       Soc Hx: reviewed  Fam Hx: reviewed          Patti Lauren MD  Internal Medicine

## 2021-02-17 NOTE — PROGRESS NOTES
ANTICOAGULATION MANAGEMENT     Patient Name:  Savanna Rehman  Date:  2021    ASSESSMENT /SUBJECTIVE:    Today's INR result of 2.8 is therapeutic. Goal INR of 2.0-3.0      Warfarin dose taken: Warfarin taken as instructed    Diet: No new diet changes affecting INR    Medication changes/ interactions: fosfomycin for one day. No interaction.    Previous INR: Therapeutic     S/S of bleeding or thromboembolism: No    New injury or illness: Yes: UTI. Still having sx. Will give urine sample tomorrow. Will recheck INR one week from today in case pt is started on abx.    Upcoming surgery, procedure or cardioversion: No    Additional findings: None      PLAN:    Telephone call with home care nurse Evelin regarding INR result and instructed:     Warfarin Dosing Instructions: Continue your current warfarin dose 5mg eery Wed; 2.5mg all other days    Instructed patient to follow up no later than: 1 weeks  Orders given to  Homecare nurse/facility to recheck    Education provided: Target INR goal and significance of current INR result      Evelin verbalizes understanding and agrees to warfarin dosing plan.    Instructed to call the Anticoagulation Clinic for any changes, questions or concerns. (#[phone])        Miko West RN      OBJECTIVE:  Recent labs: (last 7 days)     21   INR 2.8*         No question data found.  Anticoagulation Summary  As of 2021    INR goal:  2.0-3.0   TTR:  65.5 % (1 y)   INR used for dosin.8 (2021)   Warfarin maintenance plan:  5 mg (2.5 mg x 2) every Wed; 2.5 mg (2.5 mg x 1) all other days   Full warfarin instructions:  5 mg every Wed; 2.5 mg all other days   Weekly warfarin total:  20 mg   No change documented:  Miko West RN   Plan last modified:  Iris Kemp RN (2021)   Next INR check:  2021   Priority:  Maintenance   Target end date:  Indefinite    Indications    Permanent atrial fibrillation (H) (Resolved) [I48.21]  Chronic atrial  fibrillation (H) [I48.20]  Long term (current) use of anticoagulants [Z79.01]             Anticoagulation Episode Summary     INR check location:      Preferred lab:  EXTERNAL LAB    Send INR reminders to:  NIKKI CHASE    Comments:  Home care       Anticoagulation Care Providers     Provider Role Specialty Phone number    Patti Suárez MD Referring Internal Medicine [phone]

## 2021-02-18 ENCOUNTER — OFFICE VISIT (OUTPATIENT)
Dept: INTERNAL MEDICINE | Facility: CLINIC | Age: 81
End: 2021-02-18
Payer: COMMERCIAL

## 2021-02-18 VITALS
TEMPERATURE: 98.3 F | DIASTOLIC BLOOD PRESSURE: 74 MMHG | HEART RATE: 83 BPM | SYSTOLIC BLOOD PRESSURE: 151 MMHG | WEIGHT: 256.2 LBS | OXYGEN SATURATION: 97 % | BODY MASS INDEX: 38.96 KG/M2

## 2021-02-18 DIAGNOSIS — D17.30 LIPOMA OF SKIN AND SUBCUTANEOUS TISSUE: Primary | ICD-10-CM

## 2021-02-18 DIAGNOSIS — N39.0 URINARY TRACT INFECTION, SITE NOT SPECIFIED: ICD-10-CM

## 2021-02-18 LAB
ALBUMIN UR-MCNC: NEGATIVE MG/DL
APPEARANCE UR: CLEAR
BILIRUB UR QL STRIP: NEGATIVE
COLOR UR AUTO: YELLOW
GLUCOSE UR STRIP-MCNC: NEGATIVE MG/DL
HGB UR QL STRIP: ABNORMAL
KETONES UR STRIP-MCNC: NEGATIVE MG/DL
LEUKOCYTE ESTERASE UR QL STRIP: NEGATIVE
NITRATE UR QL: NEGATIVE
NON-SQ EPI CELLS #/AREA URNS LPF: NORMAL /LPF
PH UR STRIP: 5.5 PH (ref 5–7)
RBC #/AREA URNS AUTO: NORMAL /HPF
SOURCE: ABNORMAL
SP GR UR STRIP: 1.02 (ref 1–1.03)
UROBILINOGEN UR STRIP-ACNC: 0.2 EU/DL (ref 0.2–1)
WBC #/AREA URNS AUTO: NORMAL /HPF

## 2021-02-18 PROCEDURE — 81001 URINALYSIS AUTO W/SCOPE: CPT | Performed by: INTERNAL MEDICINE

## 2021-02-18 PROCEDURE — 87086 URINE CULTURE/COLONY COUNT: CPT | Performed by: INTERNAL MEDICINE

## 2021-02-18 PROCEDURE — 99213 OFFICE O/P EST LOW 20 MIN: CPT | Performed by: NURSE PRACTITIONER

## 2021-02-18 RX ORDER — OXYBUTYNIN CHLORIDE 5 MG/1
TABLET ORAL
Qty: 90 TABLET | Refills: 1 | Status: SHIPPED | OUTPATIENT
Start: 2021-02-18 | End: 2021-07-11

## 2021-02-18 NOTE — PROGRESS NOTES
Assessment & Plan     Lipoma of skin and subcutaneous tissue        Observe, may have excised if bothersome             Lin Up NP  [facility] SHELLY Corrales is a 80 year old who presents for the following health issues  accompanied by herself:    HPI       She has swelling on her left arm.  Concern - LUE lump  Onset: 1 week  Description: last week LUE warm red, tender, now resolved.  Mass remains  Intensity: mild  Progression of Symptoms:  improving  Accompanying Signs & Symptoms: afebrile  Previous history of similar problem: last week negative US for DVT, CXR for PE  Precipitating factors:        Worsened by: none  Alleviating factors:        Improved by: none  Therapies tried and outcome:  none       Review of Systems   Constitutional, HEENT, cardiovascular, pulmonary, gi and gu systems are negative, except as otherwise noted.      Objective    LMP  (LMP Unknown)   BP (!) 151/74 (BP Location: Left arm, Patient Position: Sitting, Cuff Size: Adult Regular)   Pulse 83   Temp 98.3  F (36.8  C) (Oral)   Wt 116.2 kg (256 lb 3.2 oz)   LMP  (LMP Unknown)   SpO2 97%   BMI 38.96 kg/m      Physical Exam   GENERAL: alert, no distress and obese  SKIN: LUE fleshy 5 cm mass consistent with lipoma

## 2021-02-18 NOTE — TELEPHONE ENCOUNTER
Pending Prescriptions:                       Disp   Refills    oxybutynin (DITROPAN) 5 MG tablet [Pharma*90 tab*1            Sig: TAKE 1 TABLET TWICE A DAY    Prescription approved per [facility] Refill Protocol.

## 2021-02-19 LAB
BACTERIA SPEC CULT: NORMAL
SPECIMEN SOURCE: NORMAL

## 2021-02-22 ENCOUNTER — OFFICE VISIT (OUTPATIENT)
Dept: INTERNAL MEDICINE | Facility: CLINIC | Age: 81
End: 2021-02-22
Payer: COMMERCIAL

## 2021-02-22 VITALS
HEART RATE: 79 BPM | OXYGEN SATURATION: 91 % | SYSTOLIC BLOOD PRESSURE: 160 MMHG | DIASTOLIC BLOOD PRESSURE: 73 MMHG | WEIGHT: 257.3 LBS | RESPIRATION RATE: 16 BRPM | BODY MASS INDEX: 39 KG/M2 | TEMPERATURE: 98.2 F | HEIGHT: 68 IN

## 2021-02-22 DIAGNOSIS — E11.42 DIABETIC POLYNEUROPATHY ASSOCIATED WITH TYPE 2 DIABETES MELLITUS (H): ICD-10-CM

## 2021-02-22 DIAGNOSIS — E11.65 TYPE 2 DIABETES MELLITUS WITH HYPERGLYCEMIA, WITHOUT LONG-TERM CURRENT USE OF INSULIN (H): ICD-10-CM

## 2021-02-22 DIAGNOSIS — H81.10 BENIGN PAROXYSMAL POSITIONAL VERTIGO, UNSPECIFIED LATERALITY: Primary | ICD-10-CM

## 2021-02-22 DIAGNOSIS — I48.0 PAROXYSMAL ATRIAL FIBRILLATION (H): ICD-10-CM

## 2021-02-22 DIAGNOSIS — E55.9 VITAMIN D DEFICIENCY: ICD-10-CM

## 2021-02-22 DIAGNOSIS — Z00.00 ROUTINE GENERAL MEDICAL EXAMINATION AT A HEALTH CARE FACILITY: ICD-10-CM

## 2021-02-22 DIAGNOSIS — E78.5 HYPERLIPIDEMIA LDL GOAL <100: ICD-10-CM

## 2021-02-22 PROBLEM — E11.9 DIABETES MELLITUS, TYPE 2 (H): Status: ACTIVE | Noted: 2020-09-03

## 2021-02-22 PROBLEM — B37.2 CANDIDIASIS OF SKIN: Status: ACTIVE | Noted: 2019-08-13

## 2021-02-22 PROBLEM — I48.20 CHRONIC ATRIAL FIBRILLATION (H): Status: ACTIVE | Noted: 2019-10-21

## 2021-02-22 LAB
ERYTHROCYTE [DISTWIDTH] IN BLOOD BY AUTOMATED COUNT: 14.2 % (ref 10–15)
HBA1C MFR BLD: 6.3 % (ref 0–5.6)
HCT VFR BLD AUTO: 44.5 % (ref 35–47)
HGB BLD-MCNC: 14.7 G/DL (ref 11.7–15.7)
MCH RBC QN AUTO: 30.1 PG (ref 26.5–33)
MCHC RBC AUTO-ENTMCNC: 33 G/DL (ref 31.5–36.5)
MCV RBC AUTO: 91 FL (ref 78–100)
PLATELET # BLD AUTO: 185 10E9/L (ref 150–450)
RBC # BLD AUTO: 4.89 10E12/L (ref 3.8–5.2)
WBC # BLD AUTO: 6.1 10E9/L (ref 4–11)

## 2021-02-22 PROCEDURE — 85027 COMPLETE CBC AUTOMATED: CPT | Performed by: INTERNAL MEDICINE

## 2021-02-22 PROCEDURE — 36415 COLL VENOUS BLD VENIPUNCTURE: CPT | Performed by: INTERNAL MEDICINE

## 2021-02-22 PROCEDURE — 80053 COMPREHEN METABOLIC PANEL: CPT | Performed by: INTERNAL MEDICINE

## 2021-02-22 PROCEDURE — 82306 VITAMIN D 25 HYDROXY: CPT | Performed by: INTERNAL MEDICINE

## 2021-02-22 PROCEDURE — 99214 OFFICE O/P EST MOD 30 MIN: CPT | Performed by: INTERNAL MEDICINE

## 2021-02-22 PROCEDURE — 83036 HEMOGLOBIN GLYCOSYLATED A1C: CPT | Performed by: INTERNAL MEDICINE

## 2021-02-22 PROCEDURE — 80061 LIPID PANEL: CPT | Performed by: INTERNAL MEDICINE

## 2021-02-22 PROCEDURE — 84443 ASSAY THYROID STIM HORMONE: CPT | Performed by: INTERNAL MEDICINE

## 2021-02-22 RX ORDER — MECLIZINE HCL 12.5 MG 12.5 MG/1
12.5 TABLET ORAL 3 TIMES DAILY PRN
Qty: 30 TABLET | Refills: 1 | Status: SHIPPED | OUTPATIENT
Start: 2021-02-22 | End: 2021-11-01

## 2021-02-22 RX ORDER — BLOOD-GLUCOSE CONTROL, NORMAL
EACH MISCELLANEOUS
Qty: 1 BOTTLE | Refills: 3 | Status: SHIPPED | OUTPATIENT
Start: 2021-02-22 | End: 2024-01-12

## 2021-02-22 ASSESSMENT — MIFFLIN-ST. JEOR: SCORE: 1695.61

## 2021-02-22 NOTE — PROGRESS NOTES
Patient's instructions / PLAN:                                                        Plan:  1.  Labs today - [address]   2. Meclizine 12.5 mg 3 times a day for persistent dizziness  3. Vertigo exercises   4. Physical therapy for dizziness  5. Monitor the blood pressure       ASSESSMENT & PLAN:                                                      Med Prob: multiple drug allergies( angioedema 2015), DM 2, chr AFib on warfarin,   HLip, CRISTIANA on CPAP, Urgency-Frequency syndrome, recurrent UTI,     (H81.10) Benign paroxysmal positional vertigo, unspecified laterality  (primary encounter diagnosis)  Comment: no neuro deficits, no HA, no vision changes, no hearing deficit.  I don't see the need for further tests at this time  Plan: meclizine (ANTIVERT) 12.5 MG tablet, PHYSICAL         THERAPY REFERRAL            (E11.65) Type 2 diabetes mellitus with hyperglycemia, without long-term current use of insulin (H)  Comment: Controlled    Plan: blood glucose monitoring (NO BRAND SPECIFIED)         meter device kit, Albumin Random Urine         Quantitative with Creat Ratio, blood glucose         (NO BRAND SPECIFIED) lancets standard, blood         glucose (NO BRAND SPECIFIED) test strip, blood         glucose monitoring (NO BRAND SPECIFIED) meter         device kit, blood glucose calibration (NO BRAND        SPECIFIED) solution            (E55.9) Vitamin D deficiency  Comment:   Plan:     (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  (H)  Comment:   Plan: blood glucose (NO BRAND SPECIFIED) lancets         standard, blood glucose (NO BRAND SPECIFIED)         test strip, blood glucose monitoring (NO BRAND         SPECIFIED) meter device kit, blood glucose         calibration (NO BRAND SPECIFIED) solution            (I48.0) Paroxysmal atrial fibrillation (H)  Comment: rate Controlled    Plan: cont Coumadin. Discussed the risks of stopping it     (E78.5) Hyperlipidemia LDL goal <100  Comment:   Plan: Monitor labs    Continue same meds, same  "doses for now         Chief Complaint:                                                        Dizziness     SUBJECTIVE:                                                    History of present illness     Dizziness  -- if she turns the head quickly the room will con't spinning  -- she can't walk without the walker because the gate is wobbling  -- brain MRI: Sept 2020: IMPRESSION: 1. Cerebral atrophy.2. Moderately extensive supratentorial white matter changes which are most likely due to chronic small vessel ischemic disease. 3. No evidence for intracranial hemorrhage, acute infarct, or any focal mass lesions.    -- she hasn't been to vestibular training PT      AFib  -- she wants to stop the warfarin because it has too many potential side effects     HTN  -- BP cuff hurts her a lot. She doesn't want it at the arm, but forearm and it is still painfull     ROS:                                                      ROS: negative for fever, chills, cough, wheezes, chest pain, shortness of breath, vomiting, abdominal pain, leg swelling     OBJECTIVE:                                                    Physical Exam :    Blood pressure (!) 160/73, pulse 79, temperature 98.2  F (36.8  C), temperature source Oral, resp. rate 16, height 1.727 m (5' 8\"), weight 116.7 kg (257 lb 4.8 oz), SpO2 91 %, not currently breastfeeding.   NAD, appears comfortable  Skin: no rashes   Neck: supple, no JVD, No thyroidmegaly. Lymph nodes nonpalpable cervical and supraclavicular.  Chest: clear to auscultation bilaterally, good respiratory effort  Heart: S1 S2, RRR, no mgr appreciated  Abdomen: soft, not tender, no hepatosplenomegaly or masses appreciated, no abdominal bruit, present bowel sounds  Extremities: no edema,   Neurologic: A, Ox3, no focal signs appreciated    PMHx: reviewed  Past Medical History:   Diagnosis Date     Anemia     Iron Deficiency anemia     Atrial fibrillation (H)      CAD (coronary artery disease)     non-obstructive     " Chronic pain     neck, low back, legs     Congestive heart failure (H)      Degenerative disk disease      Fibromyalgia      Gastro-oesophageal reflux disease      Gout      Hiatal hernia      Mumps      Neuropathy      Palpitations      Pernicious anemia      Sleep apnea     uses CPAP.     Urinary incontinence      Vitamin D deficiency       PSHx: reviewed  Past Surgical History:   Procedure Laterality Date     APPENDECTOMY       CHOLECYSTECTOMY       COLONOSCOPY  3/15/2011     CORONARY ANGIOGRAPHY ADULT ORDER       CYSTOSCOPY, BIOPSY BLADDER, COMBINED N/A 7/13/2020    Procedure: CYSTOSCOPY, WITH BLADDER BIOPSY;  Surgeon: Deisy Wilson MD;  Location: [facility]     HEART CATH LEFT HEART CATH  12/30/16    medication management     HYSTERECTOMY TOTAL ABDOMINAL       Knee replacement NOS Left      LAPAROSCOPIC NISSEN FUNDOPLICATION N/A 2/4/2015    Procedure: LAPAROSCOPIC NISSEN FUNDOPLICATION;  Surgeon: Armando Ansari MD;  Location: [facility] OR     TONSILLECTOMY       TRANSPOSITION ULNAR NERVE (ELBOW)          Meds: reviewed  Current Outpatient Medications   Medication Sig Dispense Refill     ACE/ARB/ARNI NOT PRESCRIBED (INTENTIONAL) Please choose reason not prescribed, below       alcohol swab prep pads Use to swab area of injection/chandrakant as directed. 100 each 3     EPINEPHrine (EPIPEN 2-ROBBY) 0.3 MG/0.3ML injection 2-pack Inject 0.3 mLs (0.3 mg) into the muscle once as needed for anaphylaxis 1 each 1     glipiZIDE (GLUCOTROL XL) 2.5 MG 24 hr tablet Take 1 tablet (2.5 mg) by mouth daily 90 tablet 1     ONETOUCH ULTRA test strip USE 1 STRIP TO CHECK GLUCOSE ONCE DAILY 100 strip 1     order for DME Oxygen 2 Li/min  at night bleed with CPAP       oxybutynin (DITROPAN) 5 MG tablet TAKE 1 TABLET TWICE A DAY 90 tablet 1     warfarin ANTICOAGULANT (COUMADIN) 2.5 MG tablet TAKE AS INSTRUCTED BY YOUR PRESCRIBER 126 tablet 3     B Complex-C (SUPER B COMPLEX PO) Take 1 tablet by mouth At Bedtime       balsalazide (COLAZAL) 750 MG  capsule Take 2,250 mg by mouth 3 times daily       BETA BLOCKER NOT PRESCRIBED (INTENTIONAL) Beta Blocker not prescribed intentionally due to Hypotension (SBP < 90) (Patient not taking: Reported on 2/22/2021)       Biotin 5000 MCG TABS Take 5,000 mcg by mouth At Bedtime       blood glucose calibration (NO BRAND SPECIFIED) solution To accompany: Blood Glucose Monitor Brands: per insurance. (Patient not taking: Reported on 2/22/2021) 1 Bottle 3     blood glucose monitoring (NO BRAND SPECIFIED) meter device kit Use to test blood sugar 1 times daily. Preferred blood glucose meter OR supplies to accompany: Blood Glucose Monitor Brands: per insurance. (Patient not taking: Reported on 2/22/2021) 1 kit 0     butalbital-aspirin-caffeine (FIORINAL) -40 MG capsule Take 1 capsule by mouth every 6 hours as needed for headaches (Patient not taking: Reported on 2/22/2021) 30 capsule 0     cholecalciferol (VITAMIN D3) 5000 units (125 mcg) capsule Take 5,000 Units by mouth At Bedtime       fosfomycin (MONUROL) 3 g Packet Take 3 g by mouth once       furosemide (LASIX) 20 MG tablet Take 1 tablet (20 mg) by mouth daily (Patient not taking: Reported on 2/22/2021) 10 tablet 0     nystatin (MYCOSTATIN) 174620 UNIT/GM external powder Apply to bilateral groin area 2x daily as needed. (Patient not taking: Reported on 2/22/2021) 30 g 1     thin (NO BRAND SPECIFIED) lancets Use with lanceting device to check glucose once a day. To accompany: Blood Glucose Monitor Brands: per insurance. (Patient not taking: Reported on 2/22/2021) 100 each 3       Soc Hx: reviewed  Fam Hx: reviewed          Patti Lauren MD  Internal Medicine

## 2021-02-22 NOTE — PATIENT INSTRUCTIONS
Plan:  1.  Labs today - [address]   2. Meclizine 12.5 mg 3 times a day for persistent dizziness  3. Vertigo exercises   4. Physical therapy for dizziness  5. Monitor the blood pressure

## 2021-02-22 NOTE — NURSING NOTE
"BP (!) 160/73 (BP Location: Right arm, Patient Position: Sitting, Cuff Size: Adult Regular)   Pulse 79   Temp 98.2  F (36.8  C) (Oral)   Resp 16   Ht 1.727 m (5' 8\")   Wt 116.7 kg (257 lb 4.8 oz)   LMP  (LMP Unknown)   SpO2 91%   BMI 39.12 kg/m      "

## 2021-02-23 LAB
ALBUMIN SERPL-MCNC: 3.7 G/DL (ref 3.4–5)
ALP SERPL-CCNC: 79 U/L (ref 40–150)
ALT SERPL W P-5'-P-CCNC: 38 U/L (ref 0–50)
ANION GAP SERPL CALCULATED.3IONS-SCNC: 6 MMOL/L (ref 3–14)
AST SERPL W P-5'-P-CCNC: 32 U/L (ref 0–45)
BILIRUB SERPL-MCNC: 1 MG/DL (ref 0.2–1.3)
BUN SERPL-MCNC: 14 MG/DL (ref 7–30)
CALCIUM SERPL-MCNC: 9.9 MG/DL (ref 8.5–10.1)
CHLORIDE SERPL-SCNC: 103 MMOL/L (ref 94–109)
CHOLEST SERPL-MCNC: 169 MG/DL
CO2 SERPL-SCNC: 29 MMOL/L (ref 20–32)
CREAT SERPL-MCNC: 0.97 MG/DL (ref 0.52–1.04)
DEPRECATED CALCIDIOL+CALCIFEROL SERPL-MC: 38 UG/L (ref 20–75)
GFR SERPL CREATININE-BSD FRML MDRD: 56 ML/MIN/{1.73_M2}
GLUCOSE SERPL-MCNC: 125 MG/DL (ref 70–99)
HDLC SERPL-MCNC: 40 MG/DL
LDLC SERPL CALC-MCNC: 107 MG/DL
NONHDLC SERPL-MCNC: 129 MG/DL
POTASSIUM SERPL-SCNC: 4.2 MMOL/L (ref 3.4–5.3)
PROT SERPL-MCNC: 8.2 G/DL (ref 6.8–8.8)
SODIUM SERPL-SCNC: 138 MMOL/L (ref 133–144)
TRIGL SERPL-MCNC: 110 MG/DL
TSH SERPL DL<=0.005 MIU/L-ACNC: 1.37 MU/L (ref 0.4–4)

## 2021-02-24 ENCOUNTER — ANTICOAGULATION THERAPY VISIT (OUTPATIENT)
Dept: INTERNAL MEDICINE | Facility: CLINIC | Age: 81
End: 2021-02-24

## 2021-02-24 ENCOUNTER — NURSE TRIAGE (OUTPATIENT)
Dept: INTERNAL MEDICINE | Facility: CLINIC | Age: 81
End: 2021-02-24

## 2021-02-24 DIAGNOSIS — I48.20 CHRONIC ATRIAL FIBRILLATION (H): ICD-10-CM

## 2021-02-24 DIAGNOSIS — Z79.01 LONG TERM (CURRENT) USE OF ANTICOAGULANTS: ICD-10-CM

## 2021-02-24 LAB — INR PPP: 1.9 (ref 0.9–1.1)

## 2021-02-24 NOTE — TELEPHONE ENCOUNTER
Patient calling. She stated this is the 2nd most painful migraine she has ever had. Home health nurse took her blood pressure twice today  10:05 am patient laying down 158/88 pulse 63  10:25 am patient laying down 166/97 pulse 68  Patient in too much pain to come in today. She has used as much pain medication that is allowed. Patient wondering about a medication she may use if she gets another horrible migraine. Please advise. Ok to call and  [phone]

## 2021-02-24 NOTE — PROGRESS NOTES
ANTICOAGULATION MANAGEMENT     Patient Name:  Savanna Rehman  Date:  2021    ASSESSMENT /SUBJECTIVE:    Today's INR result of 1.9 is subtherapeutic. Goal INR of 2.0-3.0      Warfarin dose taken: Warfarin taken as instructed    Diet: No new diet changes affecting INR    Medication changes/ interactions: No new medications/supplements affecting INR    Previous INR: Therapeutic     S/S of bleeding or thromboembolism: No    New injury or illness: No    Upcoming surgery, procedure or cardioversion: No    Additional findings: None      PLAN:    Telephone call with home care nurse Fannie regarding INR result and instructed:     Warfarin Dosing Instructions: Continue your current warfarin dose 5 mg on wed and 2.5 mg all other days    Instructed patient to follow up no later than: 1 week  Patient to recheck with home meter    Education provided: None required      Fannie verbalizes understanding and agrees to warfarin dosing plan.    Instructed to call the Anticoagulation Clinic for any changes, questions or concerns. (#[phone])        Iris Kemp RN      OBJECTIVE:  Recent labs: (last 7 days)     21  1027   INR 1.9*         No question data found.  Anticoagulation Summary  As of 2021    INR goal:  2.0-3.0   TTR:  67.2 % (1 y)   INR used for dosin.9 (2021)   Warfarin maintenance plan:  5 mg (2.5 mg x 2) every Wed; 2.5 mg (2.5 mg x 1) all other days   Full warfarin instructions:  5 mg every Wed; 2.5 mg all other days   Weekly warfarin total:  20 mg   Plan last modified:  Iris Kemp RN (2021)   Next INR check:  3/3/2021   Priority:  Maintenance   Target end date:  Indefinite    Indications    Permanent atrial fibrillation (H) (Resolved) [I48.21]  Chronic atrial fibrillation (H) [I48.20]  Long term (current) use of anticoagulants [Z79.01]             Anticoagulation Episode Summary     INR check location:      Preferred lab:  EXTERNAL LAB    Send INR reminders to:  NIKKI  SALVATORE    Comments:  Home care       Anticoagulation Care Providers     Provider Role Specialty Phone number    Patti Suárez MD Referring Internal Medicine [phone]

## 2021-02-24 NOTE — TELEPHONE ENCOUNTER
Patient reports she developed a severe migraine at 3 am. Patient reports she also has some dizziness, but is able to walk normally. Patient reports she took x2 tablets of Fiorinal and x1 tablet of meclizine with minimal relief. Patient reports the pain is mostly in her right eye, and across forehead. Patient notes history of migraines in her early 20's. Patient reports she thought she had some numbness in her right hand this morning, but it has subsided. Patient denies confusion, or speech changes.    Blood pressures from home care nurse this morning were 158/88 pulse 63, and 166/97 pulse 38752/88 pulse 63 per patient. Blood pressure at time of the call was 148/86. Patient is taking blood pressure medications as directed.    Writer advised patient should seek care in ER. Patient declined. Patient requests primary care provider be made aware. Patient states she will eat dinner and drink some water. Patient is not interested in more medication as she has a lot of allergies. Will route to primary care provider per patient request.     Patient will call back with further concerns and agrees to seek care if symptoms get worse.     Additional Information    Negative: Difficult to awaken or acting confused (e.g., disoriented, slurred speech)    Negative: Weakness of the face, arm or leg on one side of the body and new onset    Negative: Numbness of the face, arm or leg on one side of the body and new onset    Negative: Loss of speech or garbled speech and new onset    Negative: Passed out (i.e., fainted, collapsed and was not responding)    Negative: Sounds like a life-threatening emergency to the triager    Negative: Followed a head injury within last 3 days    Negative: Traumatic Brain Injury (TBI) is suspected    Negative: Sinus pain of forehead and yellow or green nasal discharge    Negative: Pregnant    Negative: Unable to walk without falling    Negative: Stiff neck (can't touch chin to chest)    Negative:  "Possibility of carbon monoxide exposure    SEVERE headache, states 'worst headache' of life    Severe pain in one eye    Negative: SEVERE headache, sudden onset (i.e., reaching maximum intensity within 30 seconds)    Answer Assessment - Initial Assessment Questions  1. LOCATION: \"Where does it hurt?\"       Right eye and across forehead  2. ONSET: \"When did the headache start?\" (Minutes, hours or days)       This morning around 3 am  3. PATTERN: \"Does the pain come and go, or has it been constant since it started?\"      Constant. Patient reports headache got \"a little\" better at 10 am   4. SEVERITY: \"How bad is the pain?\" and \"What does it keep you from doing?\"  (e.g., Scale 1-10; mild, moderate, or severe)    - MILD (1-3): doesn't interfere with normal activities     - MODERATE (4-7): interferes with normal activities or awakens from sleep     - SEVERE (8-10): excruciating pain, unable to do any normal activities         Severe   5. RECURRENT SYMPTOM: \"Have you ever had headaches before?\" If so, ask: \"When was the last time?\" and \"What happened that time?\"       Patient reports she has had history of migraines when she was in her 20's.  6. CAUSE: \"What do you think is causing the headache?\"      Patient is not sure. Patient reports recent increased stressed and that she is not drinking enough water  7. MIGRAINE: \"Have you been diagnosed with migraine headaches?\" If so, ask: \"Is this headache similar?\"       Yes   8. HEAD INJURY: \"Has there been any recent injury to the head?\"       No  9. OTHER SYMPTOMS: \"Do you have any other symptoms?\" (fever, stiff neck, eye pain, sore throat, cold symptoms)      Eye pain  10. PREGNANCY: \"Is there any chance you are pregnant?\" \"When was your last menstrual period?\"        No    Protocols used: HEADACHE-A-OH      "

## 2021-02-26 ENCOUNTER — TELEPHONE (OUTPATIENT)
Dept: INTERNAL MEDICINE | Facility: CLINIC | Age: 81
End: 2021-02-26

## 2021-02-26 NOTE — TELEPHONE ENCOUNTER
Call to patient. States 3 years ago she had in home physical therapy and she was not home bound at that time. States she is not going to do physical therapy then because she does not feel comfortable going in a cab/bus and does not feel safe due to dizziness. States at $12 per ride she would have to pay $72 per month and she can not afford this. States she heard of  water that a friend takes and it helps with dizziness so she will purchase some of this.

## 2021-02-26 NOTE — TELEPHONE ENCOUNTER
Patient is calling because she is hoping to get PHYSICAL THERAPY in her home. She states she is not comfortable getting on a bus or in a dirty cab due to her dizziness. Also she cannot really afford twelve dollars for a cab.

## 2021-02-26 NOTE — TELEPHONE ENCOUNTER
I understand her worries but she is not home bounded ( no med reason for home bounded as per medicare rules) so I can't order HHC/PT

## 2021-02-27 ENCOUNTER — NURSE TRIAGE (OUTPATIENT)
Dept: NURSING | Facility: CLINIC | Age: 81
End: 2021-02-27

## 2021-02-27 DIAGNOSIS — R51.9 ACUTE INTRACTABLE HEADACHE, UNSPECIFIED HEADACHE TYPE: ICD-10-CM

## 2021-02-27 NOTE — TELEPHONE ENCOUNTER
Has received the 2 covid Vaccine shots. Hx of dizziness for the last 6 months and PCP aware . Hx = CHF &  Obesity .   FNA  call :   Presenting problem : Pt called.  Migraine on 2/24/21 . Declines triage , but insisting on page to on-call provider for refill of   Fiorinal ( was given for Migraine , and taking sparingly due to being on blood thinners). Fiornal has ASA and only has 2 doses left , request 10 to get thru weekend ,  Uses the [facility] [phone] .      Disposition and recommendations : 1011am Smart web paged Dr ANGIE Warren to [facility] at [phone]  To ask Pt's request .    Dr Warren return call and advised Pt to go into be seen for migraine if needed before Monday .  Please call back Pt to  About refill of Fiornal on Monday 3/1/21 .   Pt  verbalizes understanding and denies further questions and will call back if further symptoms to triage or questions  . Marni Stoner RN  - [place] Nurse Advisor

## 2021-03-01 DIAGNOSIS — R51.9 ACUTE INTRACTABLE HEADACHE, UNSPECIFIED HEADACHE TYPE: ICD-10-CM

## 2021-03-01 RX ORDER — BUTALBITAL, ASPIRIN, AND CAFFEINE 325; 50; 40 MG/1; MG/1; MG/1
1 CAPSULE ORAL EVERY 6 HOURS PRN
Qty: 30 CAPSULE | Refills: 0 | Status: CANCELLED | OUTPATIENT
Start: 2021-03-01

## 2021-03-02 ENCOUNTER — NURSE TRIAGE (OUTPATIENT)
Dept: NURSING | Facility: CLINIC | Age: 81
End: 2021-03-02

## 2021-03-02 DIAGNOSIS — R51.9 ACUTE INTRACTABLE HEADACHE, UNSPECIFIED HEADACHE TYPE: ICD-10-CM

## 2021-03-02 RX ORDER — BUTALBITAL, ASPIRIN, AND CAFFEINE 325; 50; 40 MG/1; MG/1; MG/1
1 CAPSULE ORAL EVERY 6 HOURS PRN
Qty: 30 CAPSULE | Refills: 0 | Status: CANCELLED | OUTPATIENT
Start: 2021-03-02

## 2021-03-02 RX ORDER — BUTALBITAL, ASPIRIN, AND CAFFEINE 325; 50; 40 MG/1; MG/1; MG/1
1 CAPSULE ORAL EVERY 6 HOURS PRN
Qty: 30 CAPSULE | Refills: 0 | Status: CANCELLED
Start: 2021-03-02

## 2021-03-03 ENCOUNTER — ANTICOAGULATION THERAPY VISIT (OUTPATIENT)
Dept: INTERNAL MEDICINE | Facility: CLINIC | Age: 81
End: 2021-03-03

## 2021-03-03 ENCOUNTER — NURSE TRIAGE (OUTPATIENT)
Dept: NURSING | Facility: CLINIC | Age: 81
End: 2021-03-03

## 2021-03-03 DIAGNOSIS — R51.9 ACUTE INTRACTABLE HEADACHE, UNSPECIFIED HEADACHE TYPE: ICD-10-CM

## 2021-03-03 DIAGNOSIS — I48.20 CHRONIC ATRIAL FIBRILLATION (H): ICD-10-CM

## 2021-03-03 DIAGNOSIS — Z79.01 LONG TERM (CURRENT) USE OF ANTICOAGULANTS: ICD-10-CM

## 2021-03-03 LAB — INR PPP: 1.4 (ref 0.9–1.1)

## 2021-03-03 RX ORDER — BUTALBITAL, ASPIRIN, AND CAFFEINE 325; 50; 40 MG/1; MG/1; MG/1
1 CAPSULE ORAL EVERY 6 HOURS PRN
Qty: 30 CAPSULE | Refills: 0 | Status: SHIPPED | OUTPATIENT
Start: 2021-03-03 | End: 2021-03-04

## 2021-03-03 NOTE — TELEPHONE ENCOUNTER
RX is too expensive at [facility], please transfer to [facility] in [place] on [address]. They are unable to transfer this medication from one retail pharmacy to another.

## 2021-03-03 NOTE — TELEPHONE ENCOUNTER
Patient is calling for an update on her refill request- butalbital-aspirin-caffeine (FIORINAL) -40 MG capsule    Advised per chart review that the medication has not been sent to the pharmacy yet. PCP:   Patti Suárez MD     Preferred pharmacy is [facility].     Patient would appreciate a call when the medication is sent to the pharmacy, patient states she needs to notify her home health nurse to  the medication at the pharmacy.     Advised that I will send another high priority message to the clinic and provider.     Shannon Torres, RN/[facility] Nurse Advisors        Reason for Disposition    Caller has NON-URGENT medication question about med that PCP prescribed and triager unable to answer question    Additional Information    Negative: Drug overdose and nurse unable to answer question    Negative: Caller requesting information not related to medicine    Negative: Caller requesting a prescription for Strep throat and has a positive culture result    Negative: Rash while taking a medication or within 3 days of stopping it    Negative: Immunization reaction suspected    Negative: [1] Asthma AND [2] having symptoms of asthma (cough, wheezing, etc)    Negative: MORE THAN A DOUBLE DOSE of a prescription or over-the-counter (OTC) drug    Negative: [1] DOUBLE DOSE (an extra dose or lesser amount) of over-the-counter (OTC) drug AND [2] any symptoms (e.g., dizziness, nausea, pain, sleepiness)    Negative: [1] DOUBLE DOSE (an extra dose or lesser amount) of prescription drug AND [2] any symptoms (e.g., dizziness, nausea, pain, sleepiness)    Negative: Took another person's prescription drug    Negative: [1] DOUBLE DOSE (an extra dose or lesser amount) of prescription drug AND [2] NO symptoms (Exception: a double dose of antibiotics)    Negative: Diabetes drug error or overdose (e.g., insulin or extra dose)    Negative: [1] Request for URGENT new prescription or refill of  "\"essential\" medication (i.e., likelihood of harm to patient if not taken) AND [2] triager unable to fill per unit policy    Negative: [1] Prescription not at pharmacy AND [2] was prescribed today by PCP    Negative: Pharmacy calling with prescription questions and triager unable to answer question    Negative: Caller has urgent medication question about med that PCP prescribed and triager unable to answer question    Protocols used: MEDICATION QUESTION CALL-A-AH      "

## 2021-03-03 NOTE — PROGRESS NOTES
"ANTICOAGULATION MANAGEMENT     Patient Name:  Savanna Rehman  Date:  3/3/2021    ASSESSMENT /SUBJECTIVE:    Today's INR result of 1.4 is subtherapeutic. Goal INR of 2.0-3.0      Warfarin dose taken: Warfarin taken as instructed    Diet: Started drinking \"Body Armor\" Water, drinking 2 bottles daily - unable to find Vit. K in ingredients     Medication changes/ interactions: No new medications/supplements affecting INR    Previous INR: Subtherapeutic 1.9    S/S of bleeding or thromboembolism: No    New injury or illness: No    Upcoming surgery, procedure or cardioversion: No    Additional findings: had 2 servings of coleslaw from [employer] this week      PLAN:    Telephone call with home care nurse Raj regarding INR result and instructed:     Warfarin Dosing Instructions: Take 7.5mg tonight then change your warfarin dose to 5mg Wed &  & 2.5mg AOD  . (12 % change)    Instructed patient to follow up no later than: 1 week  Orders given to  Homecare nurse/facility to recheck    Education provided: When to seek medical attention/emergency care      Raj verbalizes understanding and agrees to warfarin dosing plan.    Instructed to call the Anticoagulation Clinic for any changes, questions or concerns. (#[phone])        Sanjana Fox RN      OBJECTIVE:  Recent labs: (last 7 days)     21   INR 1.4*         No question data found.  Anticoagulation Summary  As of 3/3/2021    INR goal:  2.0-3.0   TTR:  66.1 % (1 y)   INR used for dosin.4 (3/3/2021)   Warfarin maintenance plan:  5 mg (2.5 mg x 2) every Wed; 2.5 mg (2.5 mg x 1) all other days   Full warfarin instructions:  5 mg every Wed; 2.5 mg all other days   Weekly warfarin total:  20 mg   Plan last modified:  Iris Kemp RN (2021)   Next INR check:     Priority:  Maintenance   Target end date:  Indefinite    Indications    Permanent atrial fibrillation (H) (Resolved) [I48.21]  Chronic atrial fibrillation (H) [I48.20]  Long term (current) " use of anticoagulants [Z79.01]             Anticoagulation Episode Summary     INR check location:      Preferred lab:  EXTERNAL LAB    Send INR reminders to:  NIKKI CHASE    Comments:  Home care       Anticoagulation Care Providers     Provider Role Specialty Phone number    Patti Suárez MD Referring Internal Medicine [phone]         Sanjana Green RN, BSN, PHN

## 2021-03-03 NOTE — TELEPHONE ENCOUNTER
Patient calling to check on status of refill, she needs to pick it up today (only day she has transportation)

## 2021-03-04 RX ORDER — BUTALBITAL, ASPIRIN, AND CAFFEINE 325; 50; 40 MG/1; MG/1; MG/1
CAPSULE ORAL
Qty: 30 CAPSULE | Refills: 0
Start: 2021-03-04

## 2021-03-04 RX ORDER — BUTALBITAL, ASPIRIN, AND CAFFEINE 325; 50; 40 MG/1; MG/1; MG/1
1 CAPSULE ORAL EVERY 6 HOURS PRN
Qty: 30 CAPSULE | Refills: 0 | Status: SHIPPED | OUTPATIENT
Start: 2021-03-04 | End: 2022-01-12

## 2021-03-04 NOTE — TELEPHONE ENCOUNTER
Patient calling again.  Requesting Firoinal Rx to be re-sent to [facility] in [place] since it was too expensive at [facility].  She says she plans to only  10 of the 30 tablets prescribed because this is all she can afford at this time.    Tammy Hilliard RN  Triage Nurse Advisor

## 2021-03-04 NOTE — TELEPHONE ENCOUNTER
Pending Prescriptions:                       Disp   Refills    butalbital-aspirin-caffeine (FIORINAL) 50-*30 cap*0        Sig: TAKE 1 CAPSULE BY MOUTH EVERY 6 HOURS AS NEEDED FOR           HEADACHE    Routing refill request to provider for review/approval because:  Drug not on the [facility] refill protocol

## 2021-03-04 NOTE — TELEPHONE ENCOUNTER
Please fax to [facility] as it is cheaper to get at this pharmacy and cannot be transferred.     She has a migraine and needs the medication.     [facility] pharmacy [address] [facility]

## 2021-03-09 ENCOUNTER — TELEPHONE (OUTPATIENT)
Dept: INTERNAL MEDICINE | Facility: CLINIC | Age: 81
End: 2021-03-09

## 2021-03-09 NOTE — TELEPHONE ENCOUNTER
Prior Authorization Retail Medication Request    Medication/Dose: butalbital-aspirin-caffeine (FIORINAL) -40 MG capsule  ICD code (if different than what is on RX):     Previously Tried and Failed:     Rationale:       Insurance Name:   Mar[employer]  Insurance ID:   [id number]  Bin:  [id number]  [phone]      Pharmacy Information (if different than what is on RX)  Name:     Phone:

## 2021-03-09 NOTE — TELEPHONE ENCOUNTER
If it is not covered, patient needs to call insurance and find out which equivalent is covered.

## 2021-03-10 ENCOUNTER — ANTICOAGULATION THERAPY VISIT (OUTPATIENT)
Dept: INTERNAL MEDICINE | Facility: CLINIC | Age: 81
End: 2021-03-10

## 2021-03-10 DIAGNOSIS — I48.20 CHRONIC ATRIAL FIBRILLATION (H): ICD-10-CM

## 2021-03-10 DIAGNOSIS — Z79.01 LONG TERM (CURRENT) USE OF ANTICOAGULANTS: ICD-10-CM

## 2021-03-10 LAB — INR PPP: 1.7 (ref 0.9–1.1)

## 2021-03-10 NOTE — PROGRESS NOTES
ANTICOAGULATION MANAGEMENT     Patient Name:  Savanna Rehman  Date:  3/10/2021    ASSESSMENT /SUBJECTIVE:    Today's INR result of 1.7 is subtherapeutic. Goal INR of 2.0-3.0      Warfarin dose taken: Warfarin taken as instructed    Diet: complains of bladder infection and is wating a call back to bring in urine sample for testin    Medication changes/ interactions: No new medications/supplements affecting INR    Previous INR: Subtherapeutic     S/S of bleeding or thromboembolism: No    New injury or illness: No    Upcoming surgery, procedure or cardioversion: No    Additional findings: has tooth extraction next week and HC will be checking inr the day before on Tuesday.      PLAN:    Telephone call with home care nurse Fannie regarding INR result and instructed:     Warfarin Dosing Instructions: 7.5 mg tonight then continue your current warfarin dose of then regular dosing afte that    Instructed patient to follow up no later than: 1 week  Patient elected to schedule next visit with home care.    Education provided: None required      Hc Rn verbalizes understanding and agrees to warfarin dosing plan.    Instructed to call the Anticoagulation Clinic for any changes, questions or concerns. (#[phone])        Ruchi Asif RN      OBJECTIVE:  Recent labs: (last 7 days)     03/10/21   INR 1.7*         INR assessment SUB    Recheck INR In: 6 DAYS    INR Location Clinic      Anticoagulation Summary  As of 3/10/2021    INR goal:  2.0-3.0   TTR:  64.2 % (1 y)   INR used for dosin.7 (3/10/2021)   Warfarin maintenance plan:  5 mg (2.5 mg x 2) every Sun, Wed; 2.5 mg (2.5 mg x 1) all other days   Full warfarin instructions:  3/10: 7.5 mg; Otherwise 5 mg every Sun, Wed; 2.5 mg all other days   Weekly warfarin total:  22.5 mg   Plan last modified:  Sanjana Fox, RN (3/3/2021)   Next INR check:  3/16/2021   Priority:  Maintenance   Target end date:  Indefinite    Indications    Permanent atrial fibrillation (H)  (Resolved) [I48.21]  Chronic atrial fibrillation (H) [I48.20]  Long term (current) use of anticoagulants [Z79.01]             Anticoagulation Episode Summary     INR check location:      Preferred lab:  EXTERNAL LAB    Send INR reminders to:  NIKKI CHASE    Comments:  Home care       Anticoagulation Care Providers     Provider Role Specialty Phone number    Patti Suárez MD Referring Internal Medicine [phone]

## 2021-03-11 DIAGNOSIS — N39.0 RECURRENT UTI: Primary | ICD-10-CM

## 2021-03-11 NOTE — NURSING NOTE
Patient calling to request orders for urine culture. She has pain in her left kidney when she needs to urinate. Patient tells me she is drinking 1-2 diet cokes daily and body armour water. This writer told patient to call her ID doctor to cover this specimen.   Patient will have test done tomorrow. Patient verbalized understanding.    Adelaida Liu RN   Care Coordinator Urology

## 2021-03-11 NOTE — TELEPHONE ENCOUNTER
Patient advised to call insurance to see what comparable med would be covered in place of the Fiorinal.  Patient states the cost of Fiorinal was going to be $67 for qty 30, patient paid out of pocket for only 10 tabs which she could afford because she used a Good [employer] card.  States she may just keep paying out of pocket.      Also requested 2/22 lab results, mailed copy of labs and the letter that went with them to patient. BENITA Graham R.N.

## 2021-03-12 DIAGNOSIS — N39.0 UTI (URINARY TRACT INFECTION): Primary | ICD-10-CM

## 2021-03-16 DIAGNOSIS — R82.90 NONSPECIFIC FINDING ON EXAMINATION OF URINE: Primary | ICD-10-CM

## 2021-03-16 DIAGNOSIS — N39.0 UTI (URINARY TRACT INFECTION): ICD-10-CM

## 2021-03-16 LAB
ALBUMIN UR-MCNC: NEGATIVE MG/DL
APPEARANCE UR: CLEAR
BACTERIA #/AREA URNS HPF: ABNORMAL /HPF
BILIRUB UR QL STRIP: NEGATIVE
COLOR UR AUTO: YELLOW
GLUCOSE UR STRIP-MCNC: NEGATIVE MG/DL
HGB UR QL STRIP: ABNORMAL
KETONES UR STRIP-MCNC: NEGATIVE MG/DL
LEUKOCYTE ESTERASE UR QL STRIP: NEGATIVE
NITRATE UR QL: POSITIVE
NON-SQ EPI CELLS #/AREA URNS LPF: ABNORMAL /LPF
PH UR STRIP: 5 PH (ref 5–7)
RBC #/AREA URNS AUTO: ABNORMAL /HPF
SOURCE: ABNORMAL
SP GR UR STRIP: 1.02 (ref 1–1.03)
UROBILINOGEN UR STRIP-ACNC: 0.2 EU/DL (ref 0.2–1)
WBC #/AREA URNS AUTO: ABNORMAL /HPF

## 2021-03-16 PROCEDURE — 81001 URINALYSIS AUTO W/SCOPE: CPT | Performed by: INTERNAL MEDICINE

## 2021-03-16 PROCEDURE — 87086 URINE CULTURE/COLONY COUNT: CPT | Performed by: INTERNAL MEDICINE

## 2021-03-17 ENCOUNTER — ANTICOAGULATION THERAPY VISIT (OUTPATIENT)
Dept: INTERNAL MEDICINE | Facility: CLINIC | Age: 81
End: 2021-03-17
Payer: COMMERCIAL

## 2021-03-17 DIAGNOSIS — I48.20 CHRONIC ATRIAL FIBRILLATION (H): ICD-10-CM

## 2021-03-17 DIAGNOSIS — Z79.01 LONG TERM (CURRENT) USE OF ANTICOAGULANTS: ICD-10-CM

## 2021-03-17 LAB — INR PPP: 2.3 (ref 0.9–1.1)

## 2021-03-17 PROCEDURE — 99207 PR NO CHARGE NURSE ONLY: CPT | Performed by: INTERNAL MEDICINE

## 2021-03-17 NOTE — PROGRESS NOTES
ANTICOAGULATION FOLLOW-UP CLINIC VISIT    Patient Name:  Savanna Rehman  Date:  3/17/2021  Contact Type:  Telephone    SUBJECTIVE:  Patient Findings     Positives:  Change in health (symtpoms of UTI, noted at last INR as well), Upcoming dental procedure (tooth extraction rescheduled from today to 3/24/21)    Comments:  CLIFFORD Greco from [facility] called with therapeutic INR today. Patient was supposed to have tooth extraction today but rescheduled to next week. Homecare will check INR Tuesday, 3/23/21. Patient is still struggling with mild symptoms of UTI. She just submitted a UA yesterday, culture pending, awaiting results, may need abx - patient has h/o frequent UTIs. Otherwise, the patient was assessed for diet, medication, and activity level changes, missed or extra doses, bruising or bleeding, with no problem findings. Reviewed maintenance warfarin dosing with patient. Patient will remain on the same dose until next INR check. No other questions or concerns. Next homecare INR scheduled in 6 days.  Patricia LAIRD RN  Anticoagulation Team            Clinical Outcomes     Negatives:  Major bleeding event, Thromboembolic event, Anticoagulation-related hospital admission, Anticoagulation-related ED visit, Anticoagulation-related fatality    Comments:  CLIFFORD Greco from [facility] called with therapeutic INR today. Patient was supposed to have tooth extraction today but rescheduled to next week. Homecare will check INR Tuesday, 3/23/21. Patient is still struggling with mild symptoms of UTI. She just submitted a UA yesterday, culture pending, awaiting results, may need abx - patient has h/o frequent UTIs. Otherwise, the patient was assessed for diet, medication, and activity level changes, missed or extra doses, bruising or bleeding, with no problem findings. Reviewed maintenance warfarin dosing with patient. Patient will remain on the same dose until next INR check. No other questions or concerns. Next homecare INR  scheduled in 6 days.  Patricia LAIRD RN  Anticoagulation Team               OBJECTIVE    Recent labs: (last 7 days)     21   INR 2.3*       ASSESSMENT / PLAN  INR assessment THER    Recheck INR In: 6 DAYS    INR Location Homecare INR      Anticoagulation Summary  As of 3/17/2021    INR goal:  2.0-3.0   TTR:  63.2 % (1 y)   INR used for dosin.3 (3/17/2021)   Warfarin maintenance plan:  5 mg (2.5 mg x 2) every Sun, Wed; 2.5 mg (2.5 mg x 1) all other days   Full warfarin instructions:  5 mg every Sun, Wed; 2.5 mg all other days   Weekly warfarin total:  22.5 mg   No change documented:  Patricia Morales RN   Plan last modified:  Sanjana Fox RN (3/3/2021)   Next INR check:  3/23/2021   Priority:  Maintenance   Target end date:  Indefinite    Indications    Permanent atrial fibrillation (H) (Resolved) [I48.21]  Chronic atrial fibrillation (H) [I48.20]  Long term (current) use of anticoagulants [Z79.01]             Anticoagulation Episode Summary     INR check location:      Preferred lab:  EXTERNAL LAB    Send INR reminders to:  NIKKI CHASE    Comments:  Home care       Anticoagulation Care Providers     Provider Role Specialty Phone number    Patti Suárez MD Referring Internal Medicine [phone]            See the Encounter Report to view Anticoagulation Flowsheet and Dosing Calendar (Go to Encounters tab in chart review, and find the Anticoagulation Therapy Visit)    Patricia Morales RN

## 2021-03-18 LAB
BACTERIA SPEC CULT: NORMAL
Lab: NORMAL
SPECIMEN SOURCE: NORMAL

## 2021-03-23 ENCOUNTER — ANTICOAGULATION THERAPY VISIT (OUTPATIENT)
Dept: INTERNAL MEDICINE | Facility: CLINIC | Age: 81
End: 2021-03-23

## 2021-03-23 DIAGNOSIS — Z79.01 LONG TERM (CURRENT) USE OF ANTICOAGULANTS: ICD-10-CM

## 2021-03-23 DIAGNOSIS — I48.20 CHRONIC ATRIAL FIBRILLATION (H): ICD-10-CM

## 2021-03-23 LAB — INR PPP: 2.6 (ref 0.9–1.1)

## 2021-03-23 NOTE — PROGRESS NOTES
ANTICOAGULATION MANAGEMENT     Patient Name:  Savanna Rehman  Date:  3/23/2021    ASSESSMENT /SUBJECTIVE:    Today's INR result of 2.6 is therapeutic. Goal INR of 2.0-3.0      Warfarin dose taken: Warfarin taken as instructed    Diet: No new diet changes affecting INR    Medication changes/ interactions: No new medications/supplements affecting INR    Previous INR: Therapeutic     S/S of bleeding or thromboembolism: No    New injury or illness: No    Upcoming surgery, procedure or cardioversion: Yes: extraction of one tooth 3/24/21; no holds needed    Additional findings: None      PLAN:    Telephone call with home care nurse Raj regarding INR result and instructed:     Warfarin Dosing Instructions: Continue your current warfarin dose 5mg Sun W; 2.5 mg ROW    Instructed patient to follow up no later than: 1 week  Orders given to  Homecare nurse/facility to recheck    Education provided: None required      Raj verbalizes understanding and agrees to warfarin dosing plan.    Instructed to call the Anticoagulation Clinic for any changes, questions or concerns. (#[phone])        Phoebe Cobian, CLIFFORD      OBJECTIVE:  Recent labs: (last 7 days)     21   INR 2.3* 2.6*         INR assessment THER    Recheck INR In: 1 WEEK    INR Location Homecare INR      Anticoagulation Summary  As of 3/23/2021    INR goal:  2.0-3.0   TTR:  63.2 % (1 y)   INR used for dosin.6 (3/23/2021)   Warfarin maintenance plan:  5 mg (2.5 mg x 2) every Sun, Wed; 2.5 mg (2.5 mg x 1) all other days   Full warfarin instructions:  5 mg every Sun, Wed; 2.5 mg all other days   Weekly warfarin total:  22.5 mg   No change documented:  Rukhsana Flores RN   Plan last modified:  Sanjana Fox, CLIFFORD (3/3/2021)   Next INR check:  3/31/2021   Priority:  Maintenance   Target end date:  Indefinite    Indications    Permanent atrial fibrillation (H) (Resolved) [I48.21]  Chronic atrial fibrillation (H) [I48.20]  Long term  (current) use of anticoagulants [Z79.01]             Anticoagulation Episode Summary     INR check location:      Preferred lab:  EXTERNAL LAB    Send INR reminders to:  NIKKI CHASE    Comments:  Home care       Anticoagulation Care Providers     Provider Role Specialty Phone number    Patti Suárez MD Referring Internal Medicine [phone]

## 2021-03-25 ENCOUNTER — DOCUMENTATION ONLY (OUTPATIENT)
Dept: CARE COORDINATION | Facility: CLINIC | Age: 81
End: 2021-03-25

## 2021-03-29 ENCOUNTER — TELEPHONE (OUTPATIENT)
Dept: INTERNAL MEDICINE | Facility: CLINIC | Age: 81
End: 2021-03-29

## 2021-03-30 ENCOUNTER — ANTICOAGULATION THERAPY VISIT (OUTPATIENT)
Dept: INTERNAL MEDICINE | Facility: CLINIC | Age: 81
End: 2021-03-30

## 2021-03-30 DIAGNOSIS — Z53.9 DIAGNOSIS NOT YET DEFINED: Primary | ICD-10-CM

## 2021-03-30 DIAGNOSIS — I48.20 CHRONIC ATRIAL FIBRILLATION (H): ICD-10-CM

## 2021-03-30 DIAGNOSIS — Z79.01 LONG TERM (CURRENT) USE OF ANTICOAGULANTS: ICD-10-CM

## 2021-03-30 LAB — INR PPP: 4.4 (ref 0.9–1.1)

## 2021-03-30 PROCEDURE — G0179 MD RECERTIFICATION HHA PT: HCPCS | Performed by: INTERNAL MEDICINE

## 2021-03-30 NOTE — PROGRESS NOTES
ANTICOAGULATION MANAGEMENT     Patient Name:  Savanna Rehman  Date:  3/30/2021    ASSESSMENT /SUBJECTIVE:    Today's INR result of 4.4 is supratherapeutic. Goal INR of 2.0-3.0      Warfarin dose taken: Warfarin taken as instructed    Diet: No new diet changes affecting INR    Medication changes/ interactions: Tylenol 2-3 x qd over past few days, 2/2 DDS extraction and pain of dry socket.  Following up with DDS tomorrow.    Previous INR: Therapeutic     S/S of bleeding or thromboembolism: No    New injury or illness: Yes: see above    Upcoming surgery, procedure or cardioversion: No    Additional findings:     Death in the family causing stress and change in etoh intake over past week      PLAN:    Telephone call with home care nurse Raj regarding INR result and instructed:     Warfarin Dosing Instructions: hold tonight, 2.5 mg tomorrow night then continue your current warfarin dose of 5 mg Sun W; 2.5 mg ROW    Instructed patient to follow up no later than: 1 week  Orders given to  Homecare nurse/facility to recheck    Education provided: Monitoring for bleeding signs and symptoms and When to seek medical attention/emergency care      Raj verbalizes understanding and agrees to warfarin dosing plan.    Instructed to call the Anticoagulation Clinic for any changes, questions or concerns. (#[phone])        Phoebe Cobian RN      OBJECTIVE:  Recent labs: (last 7 days)     21   INR 4.4*         No question data found.  Anticoagulation Summary  As of 3/30/2021    INR goal:  2.0-3.0   TTR:  61.8 % (1 y)   INR used for dosin.4 (3/30/2021)   Warfarin maintenance plan:  5 mg (2.5 mg x 2) every Sun, Wed; 2.5 mg (2.5 mg x 1) all other days   Full warfarin instructions:  3/30: Hold; 3/31: 2.5 mg; Otherwise 5 mg every Sun, Wed; 2.5 mg all other days   Weekly warfarin total:  22.5 mg   Plan last modified:  Sanjana Fox, CLIFFORD (3/3/2021)   Next INR check:  2021   Priority:  Maintenance   Target end  date:  Indefinite    Indications    Permanent atrial fibrillation (H) (Resolved) [I48.21]  Chronic atrial fibrillation (H) [I48.20]  Long term (current) use of anticoagulants [Z79.01]             Anticoagulation Episode Summary     INR check location:      Preferred lab:  EXTERNAL LAB    Send INR reminders to:  NIKKI CHASE    Comments:  Home care       Anticoagulation Care Providers     Provider Role Specialty Phone number    Patti Suárez MD Referring Internal Medicine [phone]

## 2021-04-05 DIAGNOSIS — N39.0 RECURRENT UTI: ICD-10-CM

## 2021-04-05 LAB
ALBUMIN UR-MCNC: NEGATIVE MG/DL
APPEARANCE UR: CLEAR
BACTERIA #/AREA URNS HPF: ABNORMAL /HPF
BILIRUB UR QL STRIP: NEGATIVE
COLOR UR AUTO: YELLOW
GLUCOSE UR STRIP-MCNC: NEGATIVE MG/DL
HGB UR QL STRIP: ABNORMAL
KETONES UR STRIP-MCNC: NEGATIVE MG/DL
LEUKOCYTE ESTERASE UR QL STRIP: ABNORMAL
MUCOUS THREADS #/AREA URNS LPF: PRESENT /LPF
NITRATE UR QL: POSITIVE
NON-SQ EPI CELLS #/AREA URNS LPF: ABNORMAL /LPF
PH UR STRIP: 6 PH (ref 5–7)
RBC #/AREA URNS AUTO: ABNORMAL /HPF
SOURCE: ABNORMAL
SP GR UR STRIP: 1.02 (ref 1–1.03)
UROBILINOGEN UR STRIP-ACNC: 1 EU/DL (ref 0.2–1)
WBC #/AREA URNS AUTO: >100 /HPF

## 2021-04-05 PROCEDURE — 87186 SC STD MICRODIL/AGAR DIL: CPT | Performed by: UROLOGY

## 2021-04-05 PROCEDURE — 81001 URINALYSIS AUTO W/SCOPE: CPT | Performed by: UROLOGY

## 2021-04-05 PROCEDURE — 87086 URINE CULTURE/COLONY COUNT: CPT | Performed by: UROLOGY

## 2021-04-05 PROCEDURE — 87088 URINE BACTERIA CULTURE: CPT | Performed by: UROLOGY

## 2021-04-06 ENCOUNTER — ANTICOAGULATION THERAPY VISIT (OUTPATIENT)
Dept: INTERNAL MEDICINE | Facility: CLINIC | Age: 81
End: 2021-04-06

## 2021-04-06 DIAGNOSIS — Z79.01 LONG TERM (CURRENT) USE OF ANTICOAGULANTS: ICD-10-CM

## 2021-04-06 DIAGNOSIS — I48.20 CHRONIC ATRIAL FIBRILLATION (H): ICD-10-CM

## 2021-04-06 LAB — INR PPP: 4 (ref 0.9–1.1)

## 2021-04-06 NOTE — PROGRESS NOTES
ANTICOAGULATION MANAGEMENT     Patient Name:  Savanna Rehman  Date:  2021    ASSESSMENT /SUBJECTIVE:    Today's INR result of 4.0 is supratherapeutic. Goal INR of 2.0-3.0      Warfarin dose taken: Warfarin taken as instructed    Diet: No new diet changes affecting INR    Medication changes/ interactions: No new medications/supplements affecting INR    Previous INR: Supratherapeutic     S/S of bleeding or thromboembolism: No    New injury or illness: Yes: UTI sxs; UA obtained today, results are pending    Upcoming surgery, procedure or cardioversion: No    Additional findings: None      PLAN:    Telephone call with home care nurse Raj regarding INR result and instructed:     Warfarin Dosing Instructions: hold today then change your warfarin dose to 5 mg Sun; 2.5 mg ROW  . (11.1 % change)    Instructed patient to follow up no later than: 1 week  Orders given to  Homecare nurse/facility to recheck    Education provided: Monitoring for bleeding signs and symptoms and When to seek medical attention/emergency care      Raj verbalizes understanding and agrees to warfarin dosing plan.    Instructed to call the Anticoagulation Clinic for any changes, questions or concerns. (#[phone])        Phoebe Cobian, CLIFFORD      OBJECTIVE:  Recent labs: (last 7 days)     21   INR 4.0*         INR assessment SUPRA    Recheck INR In: 1 WEEK    INR Location Homecare INR      Anticoagulation Summary  As of 2021    INR goal:  2.0-3.0   TTR:  59.8 % (1 y)   INR used for dosin.0 (2021)   Warfarin maintenance plan:  5 mg (2.5 mg x 2) every Sun; 2.5 mg (2.5 mg x 1) all other days   Full warfarin instructions:  : Hold; Otherwise 5 mg every Sun; 2.5 mg all other days   Weekly warfarin total:  20 mg   Plan last modified:  Rukhsana Flores, RN (2021)   Next INR check:  2021   Priority:  Maintenance   Target end date:  Indefinite    Indications    Permanent atrial fibrillation (H) (Resolved)  [I48.21]  Chronic atrial fibrillation (H) [I48.20]  Long term (current) use of anticoagulants [Z79.01]             Anticoagulation Episode Summary     INR check location:      Preferred lab:  EXTERNAL LAB    Send INR reminders to:  NIKKI CHASE    Comments:  Home care       Anticoagulation Care Providers     Provider Role Specialty Phone number    Patti Suárez MD Referring Internal Medicine [phone]

## 2021-04-07 ENCOUNTER — PATIENT OUTREACH (OUTPATIENT)
Dept: UROLOGY | Facility: CLINIC | Age: 81
End: 2021-04-07

## 2021-04-07 ENCOUNTER — PRE VISIT (OUTPATIENT)
Dept: UROLOGY | Facility: CLINIC | Age: 81
End: 2021-04-07

## 2021-04-07 LAB
BACTERIA SPEC CULT: ABNORMAL
Lab: ABNORMAL
SPECIMEN SOURCE: ABNORMAL

## 2021-04-07 NOTE — TELEPHONE ENCOUNTER
Patient has UTI- Called Dr. Granados's office for treatment. She will help with finding the appropriate antibiotic for this infection.  Patients antibiotic will need to be extended to her urodynamics test.  Will monitor    Adelaida Liu RN   Care Coordinator Urology

## 2021-04-09 ENCOUNTER — PRE VISIT (OUTPATIENT)
Dept: UROLOGY | Facility: CLINIC | Age: 81
End: 2021-04-09

## 2021-04-13 ENCOUNTER — ANTICOAGULATION THERAPY VISIT (OUTPATIENT)
Dept: NURSING | Facility: CLINIC | Age: 81
End: 2021-04-13

## 2021-04-13 DIAGNOSIS — Z79.01 LONG TERM (CURRENT) USE OF ANTICOAGULANTS: ICD-10-CM

## 2021-04-13 DIAGNOSIS — I48.20 CHRONIC ATRIAL FIBRILLATION (H): ICD-10-CM

## 2021-04-13 LAB — INR PPP: 2 (ref 0.9–1.1)

## 2021-04-13 NOTE — PROGRESS NOTES
ANTICOAGULATION MANAGEMENT     Patient Name:  Savanna Rehman  Date:  2021    ASSESSMENT /SUBJECTIVE:    Today's INR result of 2.0 is therapeutic. Goal INR of 2.0-3.0      Warfarin dose taken: Warfarin taken as instructed    Diet: No new diet changes affecting INR    Medication changes/ interactions: Potential interaction between cephalexin 500 mg four times daily for 10 days and warfarin which may affect subsequent INRs    Previous INR: Supratherapeutic     S/S of bleeding or thromboembolism: No    New injury or illness: Patient being treated for UTI    Upcoming surgery, procedure or cardioversion: No    Additional findings: None      PLAN:    Telephone call with home care nurse Rosa regarding INR result and instructed:     Warfarin Dosing Instructions: Continue your current warfarin dose 5 mg  and 2.5 mg all other days    Instructed patient to follow up no later than: 1 week  Orders given to  Homecare nurse/facility to recheck    Education provided: Importance of therapeutic range, Importance of following up for INR monitoring at instructed interval, Importance of taking warfarin as instructed, Potential interaction between warfarin and cephalexin and Importance of notifying clinic for changes in medications; a sooner lab recheck maybe needed.      Rosa home care nurse verbalizes understanding and agrees to warfarin dosing plan.    Instructed to call the Anticoagulation Clinic for any changes, questions or concerns. (#[phone])        Roya Sky RN      OBJECTIVE:  Recent labs: (last 7 days)     21   INR 2.0*         No question data found.  Anticoagulation Summary  As of 2021    INR goal:  2.0-3.0   TTR:  60.7 % (1 y)   INR used for dosin.0 (2021)   Warfarin maintenance plan:  5 mg (2.5 mg x 2) every Sun; 2.5 mg (2.5 mg x 1) all other days   Full warfarin instructions:  5 mg every Sun; 2.5 mg all other days   Weekly warfarin total:  20 mg   Plan last modified:   Rukhsana Flores RN (4/6/2021)   Next INR check:     Priority:  Maintenance   Target end date:  Indefinite    Indications    Permanent atrial fibrillation (H) (Resolved) [I48.21]  Chronic atrial fibrillation (H) [I48.20]  Long term (current) use of anticoagulants [Z79.01]             Anticoagulation Episode Summary     INR check location:      Preferred lab:  EXTERNAL LAB    Send INR reminders to:  NIKKI CHASE    Comments:  Home care       Anticoagulation Care Providers     Provider Role Specialty Phone number    Patti Suárez MD Referring Internal Medicine [phone]

## 2021-04-14 ENCOUNTER — TELEPHONE (OUTPATIENT)
Dept: CARDIOLOGY | Facility: CLINIC | Age: 81
End: 2021-04-14

## 2021-04-14 ENCOUNTER — HOSPITAL ENCOUNTER (OUTPATIENT)
Dept: CARDIOLOGY | Facility: CLINIC | Age: 81
Discharge: HOME | End: 2021-04-14
Attending: INTERNAL MEDICINE | Admitting: INTERNAL MEDICINE
Payer: COMMERCIAL

## 2021-04-14 DIAGNOSIS — G47.33 OBSTRUCTIVE SLEEP APNEA SYNDROME: ICD-10-CM

## 2021-04-14 DIAGNOSIS — E66.01 MORBID OBESITY (H): ICD-10-CM

## 2021-04-14 DIAGNOSIS — I25.10 MILD CORONARY ARTERY DISEASE: ICD-10-CM

## 2021-04-14 DIAGNOSIS — Z79.01 WARFARIN ANTICOAGULATION: ICD-10-CM

## 2021-04-14 DIAGNOSIS — R60.0 BILATERAL LOWER EXTREMITY EDEMA: ICD-10-CM

## 2021-04-14 DIAGNOSIS — Z91.199 POOR COMPLIANCE WITH CONTINUOUS POSITIVE AIRWAY PRESSURE TREATMENT: ICD-10-CM

## 2021-04-14 DIAGNOSIS — I48.21 PERMANENT ATRIAL FIBRILLATION (H): ICD-10-CM

## 2021-04-14 PROCEDURE — 93306 TTE W/DOPPLER COMPLETE: CPT

## 2021-04-14 PROCEDURE — 93306 TTE W/DOPPLER COMPLETE: CPT | Mod: 26 | Performed by: INTERNAL MEDICINE

## 2021-04-14 NOTE — TELEPHONE ENCOUNTER
Echo 4-14-21  The visual ejection fraction is estimated at 55-60%.  Left ventricular systolic function is normal.  There is moderate (2+) tricuspid regurgitation.  The ascending aorta is Mildly dilated.  The rhythm was atrial fibrillation.    Next annual visit with Dr. Kenny 4-20-21    Last Dr. Salgado visit 2-

## 2021-04-20 ENCOUNTER — ANTICOAGULATION THERAPY VISIT (OUTPATIENT)
Dept: INTERNAL MEDICINE | Facility: CLINIC | Age: 81
End: 2021-04-20

## 2021-04-20 ENCOUNTER — VIRTUAL VISIT (OUTPATIENT)
Dept: CARDIOLOGY | Facility: CLINIC | Age: 81
End: 2021-04-20
Payer: COMMERCIAL

## 2021-04-20 ENCOUNTER — TELEPHONE (OUTPATIENT)
Dept: INTERNAL MEDICINE | Facility: CLINIC | Age: 81
End: 2021-04-20

## 2021-04-20 ENCOUNTER — PRE VISIT (OUTPATIENT)
Dept: UROLOGY | Facility: CLINIC | Age: 81
End: 2021-04-20

## 2021-04-20 DIAGNOSIS — I36.1 NONRHEUMATIC TRICUSPID VALVE REGURGITATION: ICD-10-CM

## 2021-04-20 DIAGNOSIS — I48.20 CHRONIC ATRIAL FIBRILLATION (H): ICD-10-CM

## 2021-04-20 DIAGNOSIS — I77.810 ASCENDING AORTA DILATATION (H): ICD-10-CM

## 2021-04-20 DIAGNOSIS — I48.21 PERMANENT ATRIAL FIBRILLATION (H): ICD-10-CM

## 2021-04-20 DIAGNOSIS — Z79.01 LONG TERM (CURRENT) USE OF ANTICOAGULANTS: ICD-10-CM

## 2021-04-20 DIAGNOSIS — I48.21 PERMANENT ATRIAL FIBRILLATION (H): Primary | ICD-10-CM

## 2021-04-20 LAB — INR PPP: 2 (ref 0.9–1.1)

## 2021-04-20 PROCEDURE — 99213 OFFICE O/P EST LOW 20 MIN: CPT | Mod: 95 | Performed by: INTERNAL MEDICINE

## 2021-04-20 NOTE — PROGRESS NOTES
Service Date: 04/20/2021      CARDIOLOGY CLINIC VIRTUAL PROGRESS NOTE      CONSULTATION INDICATION:  Coronary artery disease, atrial fibrillation.      HISTORY OF PRESENT ILLNESS:  I had the opportunity to see patient, Savanna Rehman, in today in Cardiology Clinic for a followup visit.  She is followed by our colleague, Dr. Suárez, with Internal Medicine.      As you know, patient, Savanna Rehman, is a pleasant 80-year-old female with a past medical history significant for morbid obesity, permanent atrial fibrillation, fibromyalgia, sleep apnea, diabetes, dyslipidemia, nonobstructive coronary artery disease who presents for followup.  This was done as a virtual video visit due to the COVID-19 pandemic.      The patient was previously followed by my colleague, Dr. Geller, and was last seen in Cardiology Clinic with my colleague, Dr. Salgado, 02/2020.  The patient has a history of hospitalization 12/2016 with atrial fibrillation with RVR.  TTE at that time suggested anteroseptal hypokinesis.  Cardiac catheterization was done which did not demonstrate obstructive coronary artery disease.  An MRI was recommended; however, she did not tolerate the scanner due to anxiety.  TTE 05/2017 demonstrated resolution of prior regional wall motion abnormalities.      She has been managed on a rate control therapy with warfarin with respect to the atrial fibrillation.  She denies any chest pain, chest pressure or abnormal shortness of breath.  She denies any abnormal lower extremity swelling.  She monitors her weight and her weights have been stable.      Last TTE 04/14/2021 demonstrated LVEF 55%-60% with moderate tricuspid regurgitation.  Ascending aorta was mildly dilated at 3.7 cm.      Last labs 02/2021 are notable for a total cholesterol of 169, HDL 40, , triglycerides 110.      She has been followed in the Anticoagulation Clinic.       ASSESSMENT, PLAN AND RECOMMENDATIONS:     1.  Permanent atrial  fibrillation, asymptomatic, on warfarin.   2.  Nonobstructive coronary artery disease, cardiac catheterization 2016 demonstrated generalized atheromatous changes without focal obstructive disease.   3.  TTE 2021.  LVEF 55%-60%, normal RV function, moderate tricuspid regurgitation, mildly dilated ascending aorta.   4.  Morbid obesity.   5.  Sleep apnea.  Previous issues with CPAP adherence.   6.  Dyslipidemia.     a.  Continue current cardiac regimen of warfarin.  Patient does inquire about DOAC agents.  She will check with her insurance for coverage.  She may switch to a DOAC if financially feasible.   b.  Previously, had been on furosemide 20 mg daily.  Self-discontinued this.  Has not noticed any change in weight.  Reasonable to continue holding for now; however, advised that she contact our team if she notices any weight gain, lower extremity swelling or worsening shortness of breath.   c.  Had not been on a beta blocker and seems to be auto-rate controlled.   d.  We will check a Zio Patch.   e.  Follow up with Cardiology MELANI in about 3 months for reassessment, recommend initiation of rosuvastatin 10 mg at bedtime if patient is amenable.    f.  Follow up with me in 1 year with a repeat TTE at that time or sooner as needed.      Thank you for allowing our team to participate in the care of patient, Savanna Sanderson.  Please do not hesitate to page or call with any questions or concerns.      Mohan Kenny MD, [facility]  Cardiology  Text Page   2021        D: 2021   T: 2021   MT: NAUN      Name:     SAVANNA SANDERSON   MRN:      [id number]        Account:      [id number]   :      1940           Service Date: 2021      Document: [id number]

## 2021-04-20 NOTE — PROGRESS NOTES
"Savanna is a 80 year old who is being evaluated via a billable video visit.      How would you like to obtain your AVS? Mail a copy  If the video visit is dropped, the invitation should be resent by: Text to cell phone: [phone] [facility]   Will anyone else be joining your video visit? No       Review Of Systems  Skin: Negative  Eyes: Positive for glasses  Ears/Nose/Throat: Negative  Respiratory: Positive for sleep apnea - has not been using CPAP; coughing  Cardiovascular: Positive for dizziness, feels her heart \"squeezing\" and relieved with carbonated beverage; fatigue  Gastrointestinal: Positive for diarrhea; vomiting  Genitourinary: Positive for incontinence; bladder infections  Musculoskeletal: Positive for back pain  Neurologic: Positive for neuropathy - tingling or numbness in the feet  Psychiatric: Positive for stress; sleep disturbances  Hematologic/Lymphatic/Immunologic: Negative  Endocrine: Positive for diabetes    Patient reported vitals:  BP: N/A  Heart rate: N/A  Weight: N/A    Loreto ADAMS CMA      Video Start Time: 10:00  Video-Visit Details    Type of service:  Video Visit    Video End Time:10:12 AM    Originating Location (pt. Location): Home    Distant Location (provider location):  [facility]     Platform used for Video Visit: [facility]     CARDIOLOGY CLINIC VIRTUAL VIDEO VISIT NOTE:    Past medical history, social history, family history, medication list, allergies, most recent labs, and additional data, all personally reviewed.     HPI and A/P:    Please see dictation # [id number]    Thank you for allowing our team to participate in the care of this patient. Please do not hesitate to page or call me with any questions or concerns.    Mohan Kenny MD, [facility]  Cardiology  Pager:  [phone]  Text Page   April 20, 2021    Past Medical History:     Past Medical History:   Diagnosis Date     Anemia     Iron Deficiency anemia     Atrial fibrillation " (H)      CAD (coronary artery disease)     non-obstructive     Chronic pain     neck, low back, legs     Congestive heart failure (H)      Degenerative disk disease      Fibromyalgia      Gastro-oesophageal reflux disease      Gout      Hiatal hernia      Mumps      Neuropathy      Palpitations      Pernicious anemia      Sleep apnea     uses CPAP.     Urinary incontinence      Vitamin D deficiency         Past Surgical History:   Past Surgical History:   Procedure Laterality Date     APPENDECTOMY       CHOLECYSTECTOMY       COLONOSCOPY  3/15/2011     CORONARY ANGIOGRAPHY ADULT ORDER       CYSTOSCOPY, BIOPSY BLADDER, COMBINED N/A 7/13/2020    Procedure: CYSTOSCOPY, WITH BLADDER BIOPSY;  Surgeon: Deisy Wilson MD;  Location: [facility] OR     HEART CATH LEFT HEART CATH  12/30/16    medication management     HYSTERECTOMY TOTAL ABDOMINAL       Knee replacement NOS Left      LAPAROSCOPIC NISSEN FUNDOPLICATION N/A 2/4/2015    Procedure: LAPAROSCOPIC NISSEN FUNDOPLICATION;  Surgeon: Armando Ansari MD;  Location: [facility] OR     TONSILLECTOMY       TRANSPOSITION ULNAR NERVE (ELBOW)         Medications (outpatient):  Current Outpatient Medications   Medication Sig Dispense Refill     alcohol swab prep pads Use to swab area of injection/chandrakant as directed. 100 each 3     blood glucose (NO BRAND SPECIFIED) lancets standard Use to test blood sugar 1 time daily 100 lancet 3     blood glucose (NO BRAND SPECIFIED) test strip Use to test blood sugar 1 time daily 100 strip 3     blood glucose calibration (NO BRAND SPECIFIED) solution Use to calibrate blood glucose monitor 1 Bottle 3     blood glucose monitoring (NO BRAND SPECIFIED) meter device kit Use to test blood sugar 1 times daily or as directed. 1 kit 1     blood glucose monitoring (NO BRAND SPECIFIED) meter device kit Use to test blood sugar 1 time daily 1 kit 0     butalbital-aspirin-caffeine (FIORINAL) -40 MG capsule Take 1 capsule by mouth every 6 hours as needed for  headaches 30 capsule 0     EPINEPHrine (EPIPEN 2-ROBBY) 0.3 MG/0.3ML injection 2-pack Inject 0.3 mLs (0.3 mg) into the muscle once as needed for anaphylaxis 1 each 1     glipiZIDE (GLUCOTROL XL) 2.5 MG 24 hr tablet Take 1 tablet (2.5 mg) by mouth daily 90 tablet 1     meclizine (ANTIVERT) 12.5 MG tablet Take 1 tablet (12.5 mg) by mouth 3 times daily as needed for dizziness 30 tablet 1     nystatin (MYCOSTATIN) 119965 UNIT/GM external powder Apply to bilateral groin area 2x daily as needed. 30 g 1     oxybutynin (DITROPAN) 5 MG tablet TAKE 1 TABLET TWICE A DAY 90 tablet 1     warfarin ANTICOAGULANT (COUMADIN) 2.5 MG tablet TAKE AS INSTRUCTED BY YOUR PRESCRIBER 126 tablet 3     ACE/ARB/ARNI NOT PRESCRIBED (INTENTIONAL) Please choose reason not prescribed, below (Patient not taking: Reported on 4/20/2021)       B Complex-C (SUPER B COMPLEX PO) Take 1 tablet by mouth At Bedtime       balsalazide (COLAZAL) 750 MG capsule Take 2,250 mg by mouth 3 times daily       Biotin 5000 MCG TABS Take 5,000 mcg by mouth At Bedtime       cholecalciferol (VITAMIN D3) 5000 units (125 mcg) capsule Take 5,000 Units by mouth At Bedtime       fosfomycin (MONUROL) 3 g Packet Take 3 g by mouth once       order for DME Oxygen 2 Li/min  at night bleed with CPAP (Patient not taking: Reported on 4/20/2021)         Allergies:  Allergies   Allergen Reactions     Augmented Betamethasone Diprop [Betamethasone] Other (See Comments)     Severe yeast infection     Petroleum Jelly [Petrolatum] Anaphylaxis     Rash and swelling     Shellfish-Derived Products Anaphylaxis     Tongue swelling     Aspirin Swelling     tiongue swelling     Bacitracin      Rash swelling     Bactrim [Sulfamethoxazole W/Trimethoprim] Dizziness     Coumadin [Warfarin] Swelling     Leg swelling     Darvon [Propoxyphene] Swelling     Throat closes     Dilaudid [Hydromorphone]      Levaquin [Levofloxacin] Swelling     Tongue swelling     Neosporin [Neomycin-Polymyx-Gramicid] Swelling      rash     Nitrofurantoin      SOB, GI upset,     Oxycodone      Severe itching     Percodan [Oxycodone-Aspirin]      Severe itching     Tramadol      Vicodin [Hydrocodone-Acetaminophen]      Severe itching       Xarelto [Rivaroxaban]      Adhesive Tape Rash     Band aids      Codeine Rash       Social History:   History   Drug Use Unknown      History   Smoking Status     Former Smoker     Packs/day: 0.50     Years: 50.00     Types: Cigarettes     Quit date: 9/1/2014   Smokeless Tobacco     Never Used     Social History    Substance and Sexual Activity      Alcohol use: Yes        Frequency: Monthly or less        Comment: socially       Family History:   Family History   Problem Relation Age of Onset     Alzheimer Disease Mother      Lung Cancer Father      No Known Problems Brother      No Known Problems Brother      No Known Problems Brother      Unknown/Adopted No family hx of        Physical exam:  Constitutional: appears stated age, in no apparent distress, appears to be well nourished  Eyes: sclera anicteric, conjunctiva normal, no lesions on eyelids or lashes  ENT: normocephalic, without obvious abnormality, atraumatic  Pulmonary: no increased work of breathing  Cardiovascular: JVP normal  Gastrointestinal: No jaundice, no guarding  Neurologic: awake, alert, face symmetrical, symmetrical upper extremity movement  Skin: no abnormal rashes or lesions on limited exam  Psychiatric: affect is normal, answers questions appropriately, oriented to self and place    Labs reviewed:  Lab Results   Component Value Date    WBC 6.1 02/22/2021    RBC 4.89 02/22/2021    HGB 14.7 02/22/2021    HCT 44.5 02/22/2021    MCV 91 02/22/2021    MCH 30.1 02/22/2021    MCHC 33.0 02/22/2021    RDW 14.2 02/22/2021     02/22/2021     Sodium   Date Value Ref Range Status   02/22/2021 138 133 - 144 mmol/L Final     Potassium   Date Value Ref Range Status   02/22/2021 4.2 3.4 - 5.3 mmol/L Final     Chloride   Date Value Ref Range  Status   2021 103 94 - 109 mmol/L Final     Carbon Dioxide   Date Value Ref Range Status   2021 29 20 - 32 mmol/L Final     Anion Gap   Date Value Ref Range Status   2021 6 3 - 14 mmol/L Final     Glucose   Date Value Ref Range Status   2021 125 (H) 70 - 99 mg/dL Final     Comment:     Fasting specimen     Urea Nitrogen   Date Value Ref Range Status   2021 14 7 - 30 mg/dL Final     Creatinine   Date Value Ref Range Status   2021 0.97 0.52 - 1.04 mg/dL Final     GFR Estimate   Date Value Ref Range Status   2021 56 (L) >60 mL/min/[1.73_m2] Final     Comment:     Non  GFR Calc  Starting 2018, serum creatinine based estimated GFR (eGFR) will be   calculated using the Chronic Kidney Disease Epidemiology Collaboration   (CKD-EPI) equation.       Calcium   Date Value Ref Range Status   2021 9.9 8.5 - 10.1 mg/dL Final     Bilirubin Total   Date Value Ref Range Status   2021 1.0 0.2 - 1.3 mg/dL Final     Alkaline Phosphatase   Date Value Ref Range Status   2021 79 40 - 150 U/L Final     ALT   Date Value Ref Range Status   2021 38 0 - 50 U/L Final     AST   Date Value Ref Range Status   2021 32 0 - 45 U/L Final     Recent Labs   Lab Test 21  1146 20  1001   CHOL 169 169   HDL 40* 37*   * 111*   TRIG 110 103      Lab Results   Component Value Date    A1C 6.3 2021    A1C 6.4 2020    A1C 6.6 2020    A1C 6.4 2019    A1C 6.8 2019        Prior select studies:  Recent Results (from the past 4320 hour(s))   Echocardiogram Complete    Narrative    [id number]  [id number]  [id number]  [id number]^DANK^MOTHILAL^MERE     [facility]  Echocardiography Laboratory  [address] [place] [address]     Name: KRIS SANDERSON  MRN: [id number]  : 1940  Study Date: 2021 09:45 AM  Age: 80 yrs  Gender: Female  Patient Location: [facility]  Reason For Study: Permanent atrial  fibrillation, Warfarin anticoagulation, CRISTIANA  Ordering Physician: ISIAH WEEMS  Referring Physician: ISIAH WEEMS  Performed By: Lila Hughes     BSA: 2.3 m2  Height: 68 in  Weight: 257 lb  HR: 70  BP: 153/87 mmHg  ______________________________________________________________________________  Procedure  Complete Echo Adult.  ______________________________________________________________________________  Interpretation Summary     The visual ejection fraction is estimated at 55-60%.  Left ventricular systolic function is normal.  There is moderate (2+) tricuspid regurgitation.  The ascending aorta is Mildly dilated.  The rhythm was atrial fibrillation.  The study was technically difficult.  ______________________________________________________________________________  Left Ventricle  The left ventricle is normal in size. There is normal left ventricular wall  thickness. Left ventricular systolic function is normal. Diastolic function  not assessed due to atrial fibrillation. The visual ejection fraction is  estimated at 55-60%.     Right Ventricle  The right ventricle is normal in size and function.     Atria  The left atrium is moderately dilated. The right atrium is moderately dilated.  There is no color Doppler evidence of an atrial shunt.     Mitral Valve  There is mild mitral annular calcification. There is mild (1+) mitral  regurgitation.     Tricuspid Valve  There is moderate (2+) tricuspid regurgitation. The right ventricular systolic  pressure is approximated at 26.8 mmHg plus the right atrial pressure.     Aortic Valve  The aortic valve is trileaflet. There is mild trileaflet aortic sclerosis.  There is trace aortic regurgitation. No hemodynamically significant valvular  aortic stenosis.     Pulmonic Valve  There is no pulmonic valvular regurgitation. Normal pulmonic valve velocity.     Vessels  The aortic root is normal size. The ascending aorta is Mildly dilated. IVC  diameter and  respiratory changes fall into an intermediate range suggesting an  RA pressure of 8 mmHg.     Pericardium  There is no pericardial effusion.     Rhythm  The rhythm was atrial fibrillation.  ______________________________________________________________________________  MMode/2D Measurements & Calculations  IVSd: 1.1 cm     LVIDd: 4.9 cm  LVIDs: 2.9 cm  LVPWd: 1.0 cm  FS: 40.3 %  LV mass(C)d: 186.3 grams  LV mass(C)dI: 81.9 grams/m2  Ao root diam: 3.1 cm  LA dimension: 3.7 cm  asc Aorta Diam: 3.7 cm  LA/Ao: 1.2  LVOT diam: 2.1 cm  LVOT area: 3.5 cm2  RWT: 0.42     Doppler Measurements & Calculations  MV E max laz: 105.6 cm/sec  MV max P.6 mmHg  MV mean PG: 3.0 mmHg  MV V2 VTI: 27.5 cm  MV dec time: 0.22 sec  AI P1/2t: 674.1 msec  PA acc time: 0.10 sec  TR max laz: 257.8 cm/sec  TR max P.8 mmHg  E/E' av.6     Lateral E/e': 9.6  Medial E/e': 15.6     ______________________________________________________________________________  Report approved by: June Shay 2021 12:44 PM

## 2021-04-20 NOTE — LETTER
"4/20/2021    Patti Suárez MD  [address] [facility] [address]    RE: Savanna Sosakarie       Dear Colleague,    I had the pleasure of seeing Savanna Rehman in the [facility].    Savanna is a 80 year old who is being evaluated via a billable video visit.      How would you like to obtain your AVS? Mail a copy  If the video visit is dropped, the invitation should be resent by: Text to cell phone: [phone] [facility]   Will anyone else be joining your video visit? No       Review Of Systems  Skin: Negative  Eyes: Positive for glasses  Ears/Nose/Throat: Negative  Respiratory: Positive for sleep apnea - has not been using CPAP; coughing  Cardiovascular: Positive for dizziness, feels her heart \"squeezing\" and relieved with carbonated beverage; fatigue  Gastrointestinal: Positive for diarrhea; vomiting  Genitourinary: Positive for incontinence; bladder infections  Musculoskeletal: Positive for back pain  Neurologic: Positive for neuropathy - tingling or numbness in the feet  Psychiatric: Positive for stress; sleep disturbances  Hematologic/Lymphatic/Immunologic: Negative  Endocrine: Positive for diabetes    Patient reported vitals:  BP: N/A  Heart rate: N/A  Weight: N/A    Loreto ADAMS CMA      Video Start Time: 10:00  Video-Visit Details    Type of service:  Video Visit    Video End Time:10:12 AM    Originating Location (pt. Location): Home    Distant Location (provider location):  [facility]     Platform used for Video Visit: [facility]     CARDIOLOGY CLINIC VIRTUAL VIDEO VISIT NOTE:    Past medical history, social history, family history, medication list, allergies, most recent labs, and additional data, all personally reviewed.     HPI and A/P:    Please see dictation # [id number]    Thank you for allowing our team to participate in the care of this patient. Please do not hesitate to page or call me " with any questions or concerns.    Mohan Kenny MD, [facility]  Cardiology  Pager:  [phone]  Text Page   April 20, 2021    Past Medical History:     Past Medical History:   Diagnosis Date     Anemia     Iron Deficiency anemia     Atrial fibrillation (H)      CAD (coronary artery disease)     non-obstructive     Chronic pain     neck, low back, legs     Congestive heart failure (H)      Degenerative disk disease      Fibromyalgia      Gastro-oesophageal reflux disease      Gout      Hiatal hernia      Mumps      Neuropathy      Palpitations      Pernicious anemia      Sleep apnea     uses CPAP.     Urinary incontinence      Vitamin D deficiency         Past Surgical History:   Past Surgical History:   Procedure Laterality Date     APPENDECTOMY       CHOLECYSTECTOMY       COLONOSCOPY  3/15/2011     CORONARY ANGIOGRAPHY ADULT ORDER       CYSTOSCOPY, BIOPSY BLADDER, COMBINED N/A 7/13/2020    Procedure: CYSTOSCOPY, WITH BLADDER BIOPSY;  Surgeon: Deisy Wilson MD;  Location: [facility] OR     HEART CATH LEFT HEART CATH  12/30/16    medication management     HYSTERECTOMY TOTAL ABDOMINAL       Knee replacement NOS Left      LAPAROSCOPIC NISSEN FUNDOPLICATION N/A 2/4/2015    Procedure: LAPAROSCOPIC NISSEN FUNDOPLICATION;  Surgeon: Armando Ansari MD;  Location: [facility] OR     TONSILLECTOMY       TRANSPOSITION ULNAR NERVE (ELBOW)         Medications (outpatient):  Current Outpatient Medications   Medication Sig Dispense Refill     alcohol swab prep pads Use to swab area of injection/chandrakant as directed. 100 each 3     blood glucose (NO BRAND SPECIFIED) lancets standard Use to test blood sugar 1 time daily 100 lancet 3     blood glucose (NO BRAND SPECIFIED) test strip Use to test blood sugar 1 time daily 100 strip 3     blood glucose calibration (NO BRAND SPECIFIED) solution Use to calibrate blood glucose monitor 1 Bottle 3     blood glucose monitoring (NO BRAND SPECIFIED) meter device kit Use to test blood sugar 1  times daily or as directed. 1 kit 1     blood glucose monitoring (NO BRAND SPECIFIED) meter device kit Use to test blood sugar 1 time daily 1 kit 0     butalbital-aspirin-caffeine (FIORINAL) -40 MG capsule Take 1 capsule by mouth every 6 hours as needed for headaches 30 capsule 0     EPINEPHrine (EPIPEN 2-ROBBY) 0.3 MG/0.3ML injection 2-pack Inject 0.3 mLs (0.3 mg) into the muscle once as needed for anaphylaxis 1 each 1     glipiZIDE (GLUCOTROL XL) 2.5 MG 24 hr tablet Take 1 tablet (2.5 mg) by mouth daily 90 tablet 1     meclizine (ANTIVERT) 12.5 MG tablet Take 1 tablet (12.5 mg) by mouth 3 times daily as needed for dizziness 30 tablet 1     nystatin (MYCOSTATIN) 644954 UNIT/GM external powder Apply to bilateral groin area 2x daily as needed. 30 g 1     oxybutynin (DITROPAN) 5 MG tablet TAKE 1 TABLET TWICE A DAY 90 tablet 1     warfarin ANTICOAGULANT (COUMADIN) 2.5 MG tablet TAKE AS INSTRUCTED BY YOUR PRESCRIBER 126 tablet 3     ACE/ARB/ARNI NOT PRESCRIBED (INTENTIONAL) Please choose reason not prescribed, below (Patient not taking: Reported on 4/20/2021)       B Complex-C (SUPER B COMPLEX PO) Take 1 tablet by mouth At Bedtime       balsalazide (COLAZAL) 750 MG capsule Take 2,250 mg by mouth 3 times daily       Biotin 5000 MCG TABS Take 5,000 mcg by mouth At Bedtime       cholecalciferol (VITAMIN D3) 5000 units (125 mcg) capsule Take 5,000 Units by mouth At Bedtime       fosfomycin (MONUROL) 3 g Packet Take 3 g by mouth once       order for DME Oxygen 2 Li/min  at night bleed with CPAP (Patient not taking: Reported on 4/20/2021)         Allergies:  Allergies   Allergen Reactions     Augmented Betamethasone Diprop [Betamethasone] Other (See Comments)     Severe yeast infection     Petroleum Jelly [Petrolatum] Anaphylaxis     Rash and swelling     Shellfish-Derived Products Anaphylaxis     Tongue swelling     Aspirin Swelling     tiongue swelling     Bacitracin      Rash swelling     Bactrim [Sulfamethoxazole  W/Trimethoprim] Dizziness     Coumadin [Warfarin] Swelling     Leg swelling     Darvon [Propoxyphene] Swelling     Throat closes     Dilaudid [Hydromorphone]      Levaquin [Levofloxacin] Swelling     Tongue swelling     Neosporin [Neomycin-Polymyx-Gramicid] Swelling     rash     Nitrofurantoin      SOB, GI upset,     Oxycodone      Severe itching     Percodan [Oxycodone-Aspirin]      Severe itching     Tramadol      Vicodin [Hydrocodone-Acetaminophen]      Severe itching       Xarelto [Rivaroxaban]      Adhesive Tape Rash     Band aids      Codeine Rash       Social History:   History   Drug Use Unknown      History   Smoking Status     Former Smoker     Packs/day: 0.50     Years: 50.00     Types: Cigarettes     Quit date: 9/1/2014   Smokeless Tobacco     Never Used     Social History    Substance and Sexual Activity      Alcohol use: Yes        Frequency: Monthly or less        Comment: socially       Family History:   Family History   Problem Relation Age of Onset     Alzheimer Disease Mother      Lung Cancer Father      No Known Problems Brother      No Known Problems Brother      No Known Problems Brother      Unknown/Adopted No family hx of        Physical exam:  Constitutional: appears stated age, in no apparent distress, appears to be well nourished  Eyes: sclera anicteric, conjunctiva normal, no lesions on eyelids or lashes  ENT: normocephalic, without obvious abnormality, atraumatic  Pulmonary: no increased work of breathing  Cardiovascular: JVP normal  Gastrointestinal: No jaundice, no guarding  Neurologic: awake, alert, face symmetrical, symmetrical upper extremity movement  Skin: no abnormal rashes or lesions on limited exam  Psychiatric: affect is normal, answers questions appropriately, oriented to self and place    Labs reviewed:  Lab Results   Component Value Date    WBC 6.1 02/22/2021    RBC 4.89 02/22/2021    HGB 14.7 02/22/2021    HCT 44.5 02/22/2021    MCV 91 02/22/2021    MCH 30.1 02/22/2021     MCHC 33.0 02/22/2021    RDW 14.2 02/22/2021     02/22/2021     Sodium   Date Value Ref Range Status   02/22/2021 138 133 - 144 mmol/L Final     Potassium   Date Value Ref Range Status   02/22/2021 4.2 3.4 - 5.3 mmol/L Final     Chloride   Date Value Ref Range Status   02/22/2021 103 94 - 109 mmol/L Final     Carbon Dioxide   Date Value Ref Range Status   02/22/2021 29 20 - 32 mmol/L Final     Anion Gap   Date Value Ref Range Status   02/22/2021 6 3 - 14 mmol/L Final     Glucose   Date Value Ref Range Status   02/22/2021 125 (H) 70 - 99 mg/dL Final     Comment:     Fasting specimen     Urea Nitrogen   Date Value Ref Range Status   02/22/2021 14 7 - 30 mg/dL Final     Creatinine   Date Value Ref Range Status   02/22/2021 0.97 0.52 - 1.04 mg/dL Final     GFR Estimate   Date Value Ref Range Status   02/22/2021 56 (L) >60 mL/min/[1.73_m2] Final     Comment:     Non  GFR Calc  Starting 12/18/2018, serum creatinine based estimated GFR (eGFR) will be   calculated using the Chronic Kidney Disease Epidemiology Collaboration   (CKD-EPI) equation.       Calcium   Date Value Ref Range Status   02/22/2021 9.9 8.5 - 10.1 mg/dL Final     Bilirubin Total   Date Value Ref Range Status   02/22/2021 1.0 0.2 - 1.3 mg/dL Final     Alkaline Phosphatase   Date Value Ref Range Status   02/22/2021 79 40 - 150 U/L Final     ALT   Date Value Ref Range Status   02/22/2021 38 0 - 50 U/L Final     AST   Date Value Ref Range Status   02/22/2021 32 0 - 45 U/L Final     Recent Labs   Lab Test 02/22/21  1146 03/04/20  1001   CHOL 169 169   HDL 40* 37*   * 111*   TRIG 110 103      Lab Results   Component Value Date    A1C 6.3 02/22/2021    A1C 6.4 09/14/2020    A1C 6.6 03/04/2020    A1C 6.4 11/16/2019    A1C 6.8 07/02/2019        Prior select studies:  Recent Results (from the past 4320 hour(s))   Echocardiogram Complete    Narrative    [id number]  [id number]  [id number]  [id number]^DANK^ISIAH^MERE     [facility]  [facility]  Echocardiography Laboratory  [address] [place] [address]     Name: KRIS SANDERSON  MRN: [id number]  : 1940  Study Date: 2021 09:45 AM  Age: 80 yrs  Gender: Female  Patient Location: [facility]  Reason For Study: Permanent atrial fibrillation, Warfarin anticoagulation, CRISTIANA  Ordering Physician: ISIAH WEEMS  Referring Physician: ISIAH WEEMS  Performed By: Lila Hughes     BSA: 2.3 m2  Height: 68 in  Weight: 257 lb  HR: 70  BP: 153/87 mmHg  ______________________________________________________________________________  Procedure  Complete Echo Adult.  ______________________________________________________________________________  Interpretation Summary     The visual ejection fraction is estimated at 55-60%.  Left ventricular systolic function is normal.  There is moderate (2+) tricuspid regurgitation.  The ascending aorta is Mildly dilated.  The rhythm was atrial fibrillation.  The study was technically difficult.  ______________________________________________________________________________  Left Ventricle  The left ventricle is normal in size. There is normal left ventricular wall  thickness. Left ventricular systolic function is normal. Diastolic function  not assessed due to atrial fibrillation. The visual ejection fraction is  estimated at 55-60%.     Right Ventricle  The right ventricle is normal in size and function.     Atria  The left atrium is moderately dilated. The right atrium is moderately dilated.  There is no color Doppler evidence of an atrial shunt.     Mitral Valve  There is mild mitral annular calcification. There is mild (1+) mitral  regurgitation.     Tricuspid Valve  There is moderate (2+) tricuspid regurgitation. The right ventricular systolic  pressure is approximated at 26.8 mmHg plus the right atrial pressure.     Aortic Valve  The aortic valve is trileaflet. There is mild trileaflet aortic sclerosis.  There is trace aortic  regurgitation. No hemodynamically significant valvular  aortic stenosis.     Pulmonic Valve  There is no pulmonic valvular regurgitation. Normal pulmonic valve velocity.     Vessels  The aortic root is normal size. The ascending aorta is Mildly dilated. IVC  diameter and respiratory changes fall into an intermediate range suggesting an  RA pressure of 8 mmHg.     Pericardium  There is no pericardial effusion.     Rhythm  The rhythm was atrial fibrillation.  ______________________________________________________________________________  MMode/2D Measurements & Calculations  IVSd: 1.1 cm     LVIDd: 4.9 cm  LVIDs: 2.9 cm  LVPWd: 1.0 cm  FS: 40.3 %  LV mass(C)d: 186.3 grams  LV mass(C)dI: 81.9 grams/m2  Ao root diam: 3.1 cm  LA dimension: 3.7 cm  asc Aorta Diam: 3.7 cm  LA/Ao: 1.2  LVOT diam: 2.1 cm  LVOT area: 3.5 cm2  RWT: 0.42     Doppler Measurements & Calculations  MV E max laz: 105.6 cm/sec  MV max P.6 mmHg  MV mean PG: 3.0 mmHg  MV V2 VTI: 27.5 cm  MV dec time: 0.22 sec  AI P1/2t: 674.1 msec  PA acc time: 0.10 sec  TR max laz: 257.8 cm/sec  TR max P.8 mmHg  E/E' av.6     Lateral E/e': 9.6  Medial E/e': 15.6     ______________________________________________________________________________  Report approved by: June Shay 2021 12:44 PM             Thank you for allowing me to participate in the care of your patient.      Sincerely,     Mohan Kenny MD     [facility]    cc:   No referring provider defined for this encounter.

## 2021-04-20 NOTE — PROGRESS NOTES
ANTICOAGULATION MANAGEMENT     Patient Name:  Savanna Rehman  Date:  2021    ASSESSMENT /SUBJECTIVE:    Today's INR result of 2.0 is therapeutic. Goal INR of 2.0-3.0      Warfarin dose taken: Warfarin taken as instructed    Diet: No new diet changes affecting INR    Medication changes/ interactions: No new medications/supplements affecting INR    Previous INR: Therapeutic     S/S of bleeding or thromboembolism: No    New injury or illness: No    Upcoming surgery, procedure or cardioversion: No    Additional findings: None      PLAN:    Telephone call with home care nurse Raj regarding INR result and instructed:     Warfarin Dosing Instructions: Continue your current warfarin dose 5 mg Sun; 2.5 mg ROW    Instructed patient to follow up no later than: 1 week  Orders given to  Homecare nurse/facility to recheck    Education provided: None required      Raj verbalizes understanding and agrees to warfarin dosing plan.    Instructed to call the Anticoagulation Clinic for any changes, questions or concerns. (#[phone])        Phoebe Cobian RN      OBJECTIVE:  Recent labs: (last 7 days)     21   INR 2.0*         INR assessment THER    Recheck INR In: 1 WEEK    INR Location Homecare INR      Anticoagulation Summary  As of 2021    INR goal:  2.0-3.0   TTR:  61.7 % (1 y)   INR used for dosin.0 (2021)   Warfarin maintenance plan:  5 mg (2.5 mg x 2) every Sun; 2.5 mg (2.5 mg x 1) all other days   Full warfarin instructions:  5 mg every Sun; 2.5 mg all other days   Weekly warfarin total:  20 mg   Plan last modified:  Rukhsana Flores RN (2021)   Next INR check:  2021   Priority:  Maintenance   Target end date:  Indefinite    Indications    Permanent atrial fibrillation (H) [I48.21]  Chronic atrial fibrillation (H) [I48.20]  Long term (current) use of anticoagulants [Z79.01]             Anticoagulation Episode Summary     INR check location:      Preferred lab:   EXTERNAL LAB    Send INR reminders to:  NIKKI CHASE    Comments:  Home care       Anticoagulation Care Providers     Provider Role Specialty Phone number    Patti Suárez MD Referring Internal Medicine [phone]

## 2021-04-21 NOTE — TELEPHONE ENCOUNTER
Reason for visit: Discuss options for Omar     Relevant information: pt declined UDS    Records/imaging/labs/orders: records available    Pt called: no need for a call

## 2021-05-04 ENCOUNTER — ANTICOAGULATION THERAPY VISIT (OUTPATIENT)
Dept: INTERNAL MEDICINE | Facility: CLINIC | Age: 81
End: 2021-05-04

## 2021-05-04 ENCOUNTER — DOCUMENTATION ONLY (OUTPATIENT)
Dept: INTERNAL MEDICINE | Facility: CLINIC | Age: 81
End: 2021-05-04

## 2021-05-04 DIAGNOSIS — I48.20 CHRONIC ATRIAL FIBRILLATION (H): ICD-10-CM

## 2021-05-04 DIAGNOSIS — I48.21 PERMANENT ATRIAL FIBRILLATION (H): ICD-10-CM

## 2021-05-04 DIAGNOSIS — Z79.01 LONG TERM (CURRENT) USE OF ANTICOAGULANTS: ICD-10-CM

## 2021-05-04 LAB — INR PPP: 3 (ref 0.9–1.1)

## 2021-05-04 NOTE — PROGRESS NOTES
Raj left a VM stating she was out sick all last week so was unable to get patient's INR but was advised by nurse that the INR could wait 2 weeks. The only reason it was scheduled out to 1 weeks was due to patient request. She will be seeing the patient today and will get an INR at that time    Deborah Zepeda RN - [facility]

## 2021-05-04 NOTE — PROGRESS NOTES
ANTICOAGULATION MANAGEMENT     Patient Name:  Savanna Rehman  Date:  5/4/2021    ASSESSMENT /SUBJECTIVE:    Today's INR result of 3.0 is therapeutic. Goal INR of 2.0-3.0      Warfarin dose taken: Warfarin taken as instructed    Diet: No new diet changes affecting INR    Medication changes/ interactions: No new medications/supplements affecting INR    Previous INR: Therapeutic     S/S of bleeding or thromboembolism: No    New injury or illness: No    Upcoming surgery, procedure or cardioversion: No    Additional findings: None      PLAN:    Telephone call with home care nurse Raj regarding INR result and instructed:     Warfarin Dosing Instructions: Continue your current warfarin dose 5 mg Sun + 2.5 mg AOD.     *Consider reducing Sunday's dose to 3.75 mg if next level is hovering at the high end of range.     Instructed patient to follow up no later than: 8 days  Orders given to  Homecare nurse/facility to recheck    Education provided: Please call back if any changes to your diet, medications or how you've been taking warfarin, Monitoring for bleeding signs and symptoms, Monitoring for clotting signs and symptoms and Importance of notifying clinic for changes in medications; a sooner lab recheck maybe needed.      Raj verbalizes understanding and agrees to warfarin dosing plan.    Instructed to call the Anticoagulation Clinic for any changes, questions or concerns. (#[phone])        Lori Mendez RN      OBJECTIVE:  Recent labs: (last 7 days)     05/04/21   INR 3.0*         No question data found.  Anticoagulation Summary  As of 5/4/2021    INR goal:  2.0-3.0   TTR:  61.7 % (1 y)   INR used for dosing:  3.0 (5/4/2021)   Warfarin maintenance plan:  5 mg (2.5 mg x 2) every Sun; 2.5 mg (2.5 mg x 1) all other days   Full warfarin instructions:  5 mg every Sun; 2.5 mg all other days   Weekly warfarin total:  20 mg   Plan last modified:  Rukhsana Flores RN (4/6/2021)   Next INR check:      Priority:  Maintenance   Target end date:  Indefinite    Indications    Permanent atrial fibrillation (H) [I48.21]  Chronic atrial fibrillation (H) [I48.20]  Long term (current) use of anticoagulants [Z79.01]             Anticoagulation Episode Summary     INR check location:      Preferred lab:  EXTERNAL LAB    Send INR reminders to:  NIKKI CHASE    Comments:  Home care       Anticoagulation Care Providers     Provider Role Specialty Phone number    Patti Suárez MD Referring Internal Medicine [phone]

## 2021-05-04 NOTE — PROGRESS NOTES
ANTICOAGULATION     Savanna Rehman is overdue for INR check.      Left message for [facility] home [facility], [phone], to remind to recheck INR for patient as soon as possible.    Iris Kemp RN

## 2021-05-12 ENCOUNTER — ANTICOAGULATION THERAPY VISIT (OUTPATIENT)
Dept: INTERNAL MEDICINE | Facility: CLINIC | Age: 81
End: 2021-05-12

## 2021-05-12 DIAGNOSIS — I48.21 PERMANENT ATRIAL FIBRILLATION (H): ICD-10-CM

## 2021-05-12 DIAGNOSIS — Z79.01 LONG TERM (CURRENT) USE OF ANTICOAGULANTS: ICD-10-CM

## 2021-05-12 DIAGNOSIS — I48.20 CHRONIC ATRIAL FIBRILLATION (H): ICD-10-CM

## 2021-05-12 LAB — INR PPP: 2.8 (ref 0.9–1.1)

## 2021-05-12 NOTE — PROGRESS NOTES
ANTICOAGULATION MANAGEMENT     Patient Name:  Savanna Rehman  Date:  2021    ASSESSMENT /SUBJECTIVE:    Today's INR result of 2.8 is therapeutic. Goal INR of 2.0-3.0      Warfarin dose taken: Warfarin taken as instructed    Diet: No new diet changes affecting INR    Medication changes/ interactions: No new medications/supplements affecting INR    Previous INR: Therapeutic     S/S of bleeding or thromboembolism: No    New injury or illness: No    Upcoming surgery, procedure or cardioversion: No    Additional findings: None      PLAN:    Telephone call with home care nurse Fannie regarding INR result and instructed:     Warfarin Dosing Instructions: Continue your current warfarin dose 5 mg on  and 2.5 mg all other days    Instructed patient to follow up no later than: 2 weeks  Orders given to  Homecare nurse/facility to recheck    Education provided: None required      Fannie, home care, verbalizes understanding and agrees to warfarin dosing plan.    Instructed to call the Anticoagulation Clinic for any changes, questions or concerns. (#[phone])        Iris Kemp RN      OBJECTIVE:  Recent labs: (last 7 days)     21   INR 2.8*         No question data found.  Anticoagulation Summary  As of 2021    INR goal:  2.0-3.0   TTR:  61.7 % (1 y)   INR used for dosin.8 (2021)   Warfarin maintenance plan:  5 mg (2.5 mg x 2) every Sun; 2.5 mg (2.5 mg x 1) all other days   Full warfarin instructions:  5 mg every Sun; 2.5 mg all other days   Weekly warfarin total:  20 mg   No change documented:  Iris Kemp RN   Plan last modified:  Rukhsana Flores RN (2021)   Next INR check:  2021   Priority:  Maintenance   Target end date:  Indefinite    Indications    Permanent atrial fibrillation (H) [I48.21]  Chronic atrial fibrillation (H) [I48.20]  Long term (current) use of anticoagulants [Z79.01]             Anticoagulation Episode Summary     INR check location:       Preferred lab:  EXTERNAL LAB    Send INR reminders to:  NIKKI CHASE    Comments:  Home care       Anticoagulation Care Providers     Provider Role Specialty Phone number    Patti Suárez MD Referring Internal Medicine [phone]

## 2021-05-26 ENCOUNTER — TELEPHONE (OUTPATIENT)
Dept: INTERNAL MEDICINE | Facility: CLINIC | Age: 81
End: 2021-05-26

## 2021-05-26 ENCOUNTER — ANTICOAGULATION THERAPY VISIT (OUTPATIENT)
Dept: INTERNAL MEDICINE | Facility: CLINIC | Age: 81
End: 2021-05-26

## 2021-05-26 DIAGNOSIS — Z79.01 LONG TERM (CURRENT) USE OF ANTICOAGULANTS: ICD-10-CM

## 2021-05-26 DIAGNOSIS — I48.20 CHRONIC ATRIAL FIBRILLATION (H): ICD-10-CM

## 2021-05-26 DIAGNOSIS — T78.3XXA ANGIOEDEMA, INITIAL ENCOUNTER: ICD-10-CM

## 2021-05-26 DIAGNOSIS — I48.21 PERMANENT ATRIAL FIBRILLATION (H): ICD-10-CM

## 2021-05-26 LAB — INR PPP: 3.3 (ref 0.9–1.1)

## 2021-05-26 NOTE — PROGRESS NOTES
ANTICOAGULATION MANAGEMENT     Patient Name:  Savanna Rehman  Date:  5/26/2021    ASSESSMENT /SUBJECTIVE:    Today's INR result of 3.3 is supratherapeutic. Goal INR of 2.0-3.0      Warfarin dose taken: Warfarin taken as instructed    Diet: Change in alcohol intake may be affecting INR. increase in alcohol 4-5 days out of 7 she had a drink, normal 3-4 nights a week    Medication changes/ interactions: No new medications/supplements affecting INR    Previous INR: Therapeutic     S/S of bleeding or thromboembolism: No    New injury or illness: No    Upcoming surgery, procedure or cardioversion: No    Additional findings: None      PLAN:    Telephone call with home care nurse jennifer regarding INR result and instructed:     Warfarin Dosing Instructions: Change your warfarin dose to 2.5 mg daily  . (12.5 % change)    Instructed patient to follow up no later than: 1 week Orders given to  Homecare nurse/facility to recheck    Education provided: None required      Jennifer, home care, verbalizes understanding and agrees to warfarin dosing plan.    Instructed to call the Anticoagulation Clinic for any changes, questions or concerns. (#[phone])        Iris Kemp RN      OBJECTIVE:  Recent labs: (last 7 days)     05/26/21   INR 3.3*         No question data found.  Anticoagulation Summary  As of 5/26/2021    INR goal:  2.0-3.0   TTR:  59.4 % (1 y)   INR used for dosing:  3.3 (5/26/2021)   Warfarin maintenance plan:  5 mg (2.5 mg x 2) every Sun; 2.5 mg (2.5 mg x 1) all other days   Full warfarin instructions:  5 mg every Sun; 2.5 mg all other days   Weekly warfarin total:  20 mg   Plan last modified:  Rukhsana Flores RN (4/6/2021)   Next INR check:  6/9/2021   Priority:  Maintenance   Target end date:  Indefinite    Indications    Permanent atrial fibrillation (H) [I48.21]  Chronic atrial fibrillation (H) [I48.20]  Long term (current) use of anticoagulants [Z79.01]             Anticoagulation Episode Summary      INR check location:      Preferred lab:  EXTERNAL LAB    Send INR reminders to:  NIKKI CHASE    Comments:  Home care       Anticoagulation Care Providers     Provider Role Specialty Phone number    Patti Suárez MD Referring Internal Medicine [phone]

## 2021-05-27 ENCOUNTER — TELEPHONE (OUTPATIENT)
Dept: INTERNAL MEDICINE | Facility: CLINIC | Age: 81
End: 2021-05-27

## 2021-05-27 DIAGNOSIS — Z53.9 DIAGNOSIS NOT YET DEFINED: Primary | ICD-10-CM

## 2021-05-28 RX ORDER — EPINEPHRINE 0.3 MG/.3ML
0.3 INJECTION SUBCUTANEOUS
Qty: 1 EACH | Refills: 1 | Status: SHIPPED | OUTPATIENT
Start: 2021-05-28 | End: 2022-10-27

## 2021-05-28 NOTE — TELEPHONE ENCOUNTER
Pending Prescriptions:                       Disp   Refills    EPINEPHrine (EPIPEN 2-ROBBY) 0.3 MG/0.3ML i*1 each 1            Sig: Inject 0.3 mLs (0.3 mg) into the muscle once as           needed for anaphylaxis    Prescription approved per [facility] Refill Protocol.

## 2021-05-28 NOTE — TELEPHONE ENCOUNTER
Reason for call:  Other   Patient called regarding (reason for call): call back    Additional comments: per patient , she wanted a call back asap to get a refill on the epi pen and also the labs she wanted ordered.     Phone number to reach patient:  Home number on file [phone] (home)    Best Time:  Anytime     Can we leave a detailed message on this number?  NO    Travel screening: Not Applicable

## 2021-06-02 ENCOUNTER — ANTICOAGULATION THERAPY VISIT (OUTPATIENT)
Dept: INTERNAL MEDICINE | Facility: CLINIC | Age: 81
End: 2021-06-02

## 2021-06-02 DIAGNOSIS — E11.42 DIABETIC POLYNEUROPATHY ASSOCIATED WITH TYPE 2 DIABETES MELLITUS (H): ICD-10-CM

## 2021-06-02 DIAGNOSIS — I48.21 PERMANENT ATRIAL FIBRILLATION (H): ICD-10-CM

## 2021-06-02 DIAGNOSIS — E11.65 TYPE 2 DIABETES MELLITUS WITH HYPERGLYCEMIA, WITHOUT LONG-TERM CURRENT USE OF INSULIN (H): ICD-10-CM

## 2021-06-02 DIAGNOSIS — I48.20 CHRONIC ATRIAL FIBRILLATION (H): ICD-10-CM

## 2021-06-02 DIAGNOSIS — Z79.01 LONG TERM (CURRENT) USE OF ANTICOAGULANTS: ICD-10-CM

## 2021-06-02 LAB — INR PPP: 1.8 (ref 0.9–1.1)

## 2021-06-02 NOTE — TELEPHONE ENCOUNTER
"Requested Prescriptions   Pending Prescriptions Disp Refills     CONTOUR NEXT TEST test strip [Pharmacy Med Name: Contour Next Test In Vitro Strip]  0     Sig: USE TO TEST BLOOD SUGAR 1 TIME DAILY.       Diabetic Supplies Protocol Passed - 6/2/2021 12:16 PM        Passed - Medication is active on med list        Passed - Patient is 18 years of age or older        Passed - Recent (6 mo) or future (30 days) visit within the authorizing provider's specialty     Patient had office visit in the last 6 months or has a visit in the next 30 days with authorizing provider.  See \"Patient Info\" tab in inbasket, or \"Choose Columns\" in Meds & Orders section of the refill encounter.                 "

## 2021-06-02 NOTE — PROGRESS NOTES
ANTICOAGULATION MANAGEMENT     Patient Name:  Savanna Rehman  Date:  2021    ASSESSMENT /SUBJECTIVE:    Today's INR result of 1.8 is subtherapeutic. Goal INR of 2.0-3.0      Warfarin dose taken: Missed dose(s) may be affecting INR    Diet: No new diet changes affecting INR    Medication changes/ interactions: No new medications/supplements affecting INR    Previous INR: Supratherapeutic     S/S of bleeding or thromboembolism: No    New injury or illness: No    Upcoming surgery, procedure or cardioversion: No    Additional findings: None      PLAN:    Warfarin Dosing Instructions: 3.75 mg tonight then continue your current warfarin dose of 2.5 mg daily    Instructed patient to follow up no later than: 2 weeks  Orders given to  Homecare nurse/facility to recheck    Education provided: None required    Telephone call with home care nurse jennifer whom verbalizes understanding and agrees to plan    Instructed to call the Anticoagulation Clinic for any changes, questions or concerns. (#[phone])        Iris Kemp RN      OBJECTIVE:  Recent labs: (last 7 days)     21   INR 1.8*         No question data found.  Anticoagulation Summary  As of 2021    INR goal:  2.0-3.0   TTR:  58.8 % (1 y)   INR used for dosin.8 (2021)   Warfarin maintenance plan:  2.5 mg (2.5 mg x 1) every day   Full warfarin instructions:  : 5 mg; Otherwise 2.5 mg every day   Weekly warfarin total:  17.5 mg   Plan last modified:  Iris Kemp RN (2021)   Next INR check:  2021   Priority:  Maintenance   Target end date:  Indefinite    Indications    Permanent atrial fibrillation (H) [I48.21]  Chronic atrial fibrillation (H) [I48.20]  Long term (current) use of anticoagulants [Z79.01]             Anticoagulation Episode Summary     INR check location:      Preferred lab:  EXTERNAL LAB    Send INR reminders to:  NIKKI CHASE    Comments:  Home care       Anticoagulation Care Providers     Provider Role  Specialty Phone number    Patti Suárez MD Referring Internal Medicine [phone]

## 2021-06-03 RX ORDER — BLOOD SUGAR DIAGNOSTIC
STRIP MISCELLANEOUS
Qty: 100 STRIP | Refills: 0 | Status: SHIPPED | OUTPATIENT
Start: 2021-06-03 | End: 2021-06-24

## 2021-06-03 NOTE — TELEPHONE ENCOUNTER
Savanna's machine is Contour Next EZ. Made by [employer].  For insurance to pay, Test  Strips must be ordered specifically for her machine.  Please call into [employer]. Please call Friday, June 4th. She has a ride  Available this day.

## 2021-06-07 ENCOUNTER — TELEPHONE (OUTPATIENT)
Dept: INTERNAL MEDICINE | Facility: CLINIC | Age: 81
End: 2021-06-07

## 2021-06-09 ENCOUNTER — ANTICOAGULATION THERAPY VISIT (OUTPATIENT)
Dept: INTERNAL MEDICINE | Facility: CLINIC | Age: 81
End: 2021-06-09

## 2021-06-09 DIAGNOSIS — Z79.01 LONG TERM (CURRENT) USE OF ANTICOAGULANTS: ICD-10-CM

## 2021-06-09 DIAGNOSIS — I48.20 CHRONIC ATRIAL FIBRILLATION (H): ICD-10-CM

## 2021-06-09 DIAGNOSIS — I48.21 PERMANENT ATRIAL FIBRILLATION (H): ICD-10-CM

## 2021-06-09 LAB — INR PPP: 1.7 (ref 0.9–1.1)

## 2021-06-09 NOTE — PROGRESS NOTES
"ANTICOAGULATION MANAGEMENT     Patient Name:  Savanna Rehman  Date:  2021    ASSESSMENT /SUBJECTIVE:    Today's INR result of 1.7 is subtherapeutic. Goal INR of 2.0-3.0      Warfarin dose taken: Warfarin taken as instructed    Diet: Increased greens/vitamin K in diet; ongoing change and Change in alcohol intake may be affecting INR. not as much alcohol as \"normal week\" only had 1 drink this week     Medication changes/ interactions: No new medications/supplements affecting INR    Previous INR: Subtherapeutic     S/S of bleeding or thromboembolism: No    New injury or illness: No    Upcoming surgery, procedure or cardioversion: No    Additional findings: wants to eat more greens in her diet and also plans to have wine with Jehovah's witness friends in the upcoming future.       PLAN:    Warfarin Dosing Instructions: Change your warfarin dose to 3.75mg Wed 7 2.5mg AOD  . (7 % change)    Instructed patient to follow up no later than: 1 week  Orders given to  Homecare nurse/facility to recheck    Education provided: Potential interaction between warfarin and alcohol and Contact [facility] Anticoagulation: [phone]  with any changes, questions or concerns.     Telephone call with Savanna Tao whom verbalizes understanding and agrees to plan    Instructed to call the Anticoagulation Clinic for any changes, questions or concerns. (#[phone])        Sanjana Fox RN      OBJECTIVE:  Recent labs: (last 7 days)     21   INR 1.7*         No question data found.  Anticoagulation Summary  As of 2021    INR goal:  2.0-3.0   TTR:  56.9 % (1 y)   INR used for dosin.7 (2021)   Warfarin maintenance plan:  3.75 mg (2.5 mg x 1.5) every Wed; 2.5 mg (2.5 mg x 1) all other days   Full warfarin instructions:  3.75 mg every Wed; 2.5 mg all other days   Weekly warfarin total:  18.75 mg   Plan last modified:  Sanjana Fox, RN (2021)   Next INR check:  2021   Priority:  Maintenance   Target " end date:  Indefinite    Indications    Permanent atrial fibrillation (H) [I48.21]  Chronic atrial fibrillation (H) [I48.20]  Long term (current) use of anticoagulants [Z79.01]             Anticoagulation Episode Summary     INR check location:      Preferred lab:  EXTERNAL LAB    Send INR reminders to:  NIKKI CHASE    Comments:  Home care       Anticoagulation Care Providers     Provider Role Specialty Phone number    Patti Suárez MD Referring Internal Medicine [phone]         Sanjana Green RN, BSN, PHN  Anticoagulation Nurse  [phone]

## 2021-06-16 ENCOUNTER — ANTICOAGULATION THERAPY VISIT (OUTPATIENT)
Dept: INTERNAL MEDICINE | Facility: CLINIC | Age: 81
End: 2021-06-16

## 2021-06-16 DIAGNOSIS — I48.21 PERMANENT ATRIAL FIBRILLATION (H): ICD-10-CM

## 2021-06-16 DIAGNOSIS — I48.20 CHRONIC ATRIAL FIBRILLATION (H): ICD-10-CM

## 2021-06-16 DIAGNOSIS — Z79.01 LONG TERM (CURRENT) USE OF ANTICOAGULANTS: ICD-10-CM

## 2021-06-16 LAB — INR PPP: 2.1 (ref 0.9–1.1)

## 2021-06-16 NOTE — PROGRESS NOTES
ANTICOAGULATION MANAGEMENT     Patient Name:  Savanna Rehman  Date:  2021    ASSESSMENT /SUBJECTIVE:    Today's INR result of 2.1 is therapeutic. Goal INR of 2.0-3.0      Warfarin dose taken: Warfarin taken as instructed    Diet: No new diet changes affecting INR    Medication changes/ interactions: No new medications/supplements affecting INR    Previous INR: Subtherapeutic     S/S of bleeding or thromboembolism: No    New injury or illness: No    Upcoming surgery, procedure or cardioversion: No    Additional findings: None      PLAN:    Warfarin Dosing Instructions: Continue your current warfarin dose 3.75mg WEd & 2.5mg AOD    Instructed patient to follow up no later than: 1 week  Orders given to  Homecare nurse/facility to recheck    Education provided: none     Telephone call with home care nurse Raj whom verbalizes understanding and agrees to plan    Instructed to call the Anticoagulation Clinic for any changes, questions or concerns. (#[phone])        Sanjana Fox RN      OBJECTIVE:  Recent labs: (last 7 days)     21   INR 2.1*         No question data found.  Anticoagulation Summary  As of 2021    INR goal:  2.0-3.0   TTR:  55.4 % (1 y)   INR used for dosin.1 (2021)   Warfarin maintenance plan:  3.75 mg (2.5 mg x 1.5) every Wed; 2.5 mg (2.5 mg x 1) all other days   Full warfarin instructions:  3.75 mg every Wed; 2.5 mg all other days   Weekly warfarin total:  18.75 mg   No change documented:  Sanjana Fox RN   Plan last modified:  Sanjana Fox RN (2021)   Next INR check:  2021   Priority:  Maintenance   Target end date:  Indefinite    Indications    Permanent atrial fibrillation (H) [I48.21]  Chronic atrial fibrillation (H) [I48.20]  Long term (current) use of anticoagulants [Z79.01]             Anticoagulation Episode Summary     INR check location:      Preferred lab:  EXTERNAL LAB    Send INR reminders to:  NIKKI CHASE    Comments:  Home care        Anticoagulation Care Providers     Provider Role Specialty Phone number    Patti Suárez MD Referring Internal Medicine [phone]         Sanjana Green, RN, BSN, PHN  Anticoagulation Nurse  [phone]

## 2021-06-24 ENCOUNTER — ANTICOAGULATION THERAPY VISIT (OUTPATIENT)
Dept: INTERNAL MEDICINE | Facility: CLINIC | Age: 81
End: 2021-06-24

## 2021-06-24 DIAGNOSIS — E11.65 TYPE 2 DIABETES MELLITUS WITH HYPERGLYCEMIA, WITHOUT LONG-TERM CURRENT USE OF INSULIN (H): ICD-10-CM

## 2021-06-24 DIAGNOSIS — Z79.01 LONG TERM (CURRENT) USE OF ANTICOAGULANTS: ICD-10-CM

## 2021-06-24 DIAGNOSIS — E11.42 DIABETIC POLYNEUROPATHY ASSOCIATED WITH TYPE 2 DIABETES MELLITUS (H): ICD-10-CM

## 2021-06-24 DIAGNOSIS — I48.20 CHRONIC ATRIAL FIBRILLATION (H): ICD-10-CM

## 2021-06-24 DIAGNOSIS — I48.21 PERMANENT ATRIAL FIBRILLATION (H): ICD-10-CM

## 2021-06-24 LAB — INR PPP: 2 (ref 0.9–1.1)

## 2021-06-24 RX ORDER — BLOOD SUGAR DIAGNOSTIC
STRIP MISCELLANEOUS
Qty: 100 STRIP | Refills: 0 | Status: SHIPPED | OUTPATIENT
Start: 2021-06-24 | End: 2021-09-23

## 2021-06-24 NOTE — PROGRESS NOTES
ANTICOAGULATION MANAGEMENT     Savanna Rehman 80 year old female is on warfarin with therapeutic INR result. (Goal INR 2.0-3.0)    Recent labs: (last 7 days)     06/24/21   INR 2.0*       ASSESSMENT     Source(s): Home Care/Facility Nurse       Warfarin doses taken: Warfarin taken as instructed    Diet: No new diet changes identified    New illness, injury, or hospitalization: No    Medication/supplement changes: None noted    Signs or symptoms of bleeding or clotting: No    Previous INR: Therapeutic last visit; previously outside of goal range    Additional findings: None     PLAN     Recommended plan for no diet, medication or health factor changes affecting INR     Dosing Instructions: Continue your current warfarin dose with next INR in 2 weeks       Summary  As of 6/24/2021    Full warfarin instructions:  3.75 mg every Wed; 2.5 mg all other days   Next INR check:  7/8/2021             Telephone call with home care nurse Evelin whom verbalizes understanding and agrees to plan    Orders given to  Homecare nurse/facility to recheck    Education provided: Target INR goal and significance of current INR result    Plan made per [facility] anticoagulation protocol    Miko West RN  Anticoagulation Clinic  6/24/2021    _______________________________________________________________________     Anticoagulation Episode Summary     Current INR goal:  2.0-3.0   TTR:  55.6 % (1 y)   Target end date:  Indefinite   Send INR reminders to:  NIKKI CHASE    Indications    Permanent atrial fibrillation (H) [I48.21]  Chronic atrial fibrillation (H) [I48.20]  Long term (current) use of anticoagulants [Z79.01]           Comments:  Home care          Anticoagulation Care Providers     Provider Role Specialty Phone number    Patti Suárez MD Referring Internal Medicine [phone]

## 2021-06-25 ENCOUNTER — NURSE TRIAGE (OUTPATIENT)
Dept: NURSING | Facility: CLINIC | Age: 81
End: 2021-06-25

## 2021-06-25 DIAGNOSIS — E11.9 DIABETES MELLITUS (H): Primary | ICD-10-CM

## 2021-06-25 NOTE — TELEPHONE ENCOUNTER
Lancets  Routing refill request to provider for review/approval because:  Drug not on the [facility] refill protocol

## 2021-06-26 NOTE — TELEPHONE ENCOUNTER
Calling to get Lancets ordered.  Uses the single lancets [employer]   Per protocol can refill if seen within 6 months.  Last saw Dr. Tang was 2/22/21.  Writer call in 1 box of lancets (100 count) to [facility] pharmacy in [place].    Lucy Sanchez, RN MAN   Triage Nurse Advisor [facility]                  Reason for Disposition    Caller requesting a refill, no triage required, and triager able to refill per unit policy    Protocols used: MEDICATION QUESTION CALL-A-

## 2021-07-01 ENCOUNTER — NURSE TRIAGE (OUTPATIENT)
Dept: UROLOGY | Facility: CLINIC | Age: 81
End: 2021-07-01

## 2021-07-01 DIAGNOSIS — N39.0 RECURRENT UTI: Primary | ICD-10-CM

## 2021-07-01 NOTE — TELEPHONE ENCOUNTER
"    Reason for Disposition    [1] Discomfort (pain, burning or stinging) when passing urine AND [2] male    Additional Information    Negative: Shock suspected (e.g., cold/pale/clammy skin, too weak to stand, low BP, rapid pulse)    Negative: Sounds like a life-threatening emergency to the triager    Negative: Shock suspected (e.g., cold/pale/clammy skin, too weak to stand, low BP, rapid pulse)    Negative: Sounds like a life-threatening emergency to the triager    Negative: Followed a genital injury (e.g., penis, scrotum)    Negative: Taking antibiotic for urinary tract infection (UTI)    Negative: Pain in scrotum is main symptom    Negative: [1] SEVERE pain with urination  (e.g., excruciating) AND [2] not improved after 2 hours of pain medicine (e.g., acetaminophen or ibuprofen)    Negative: Fever > 100.4 F (38.0 C)    Negative: Side (flank) or lower back pain present    Negative: Diabetes mellitus or weak immune system (e.g., HIV positive, cancer chemo, splenectomy, organ transplant, chronic steroids)    Negative: Bedridden (e.g., nursing home patient, CVA, chronic illness, recovering from surgery)    Negative: Artificial heart valve or artificial joint    Answer Assessment - Initial Assessment Questions  1. SYMPTOM: \"What's the main symptom you're concerned about?\" (e.g., frequency, incontinence)      Frequency and pain in lower abdomen  2. ONSET: \"When did the  Symptoms  start?\"     Two days ago  3. PAIN: \"Is there any pain?\" If so, ask: \"How bad is it?\" (Scale: 1-10; mild, moderate, severe)      510  4. CAUSE: \"What do you think is causing the symptoms?\"      \"bladder\"  5. OTHER SYMPTOMS: \"Do you have any other symptoms?\" (e.g., fever, flank pain, blood in urine, pain with urination)      Denies fever, flank pain and pain with urination  6. PREGNANCY: \"Is there any chance you are pregnant?\" \"When was your last menstrual period?\"      NA    Protocols used: URINARY SYMPTOMS-A-AH, URINATION PAIN - MALE-A-AH  ( " pain level was 5/10)  Patient to collect urine specimen for culture tomorrow due to she will have a ride. Patient to call [facility] to have Dr. Granados to cover her results for treatment.  Adelaida Liu RN   Care Coordinator Urology

## 2021-07-06 ENCOUNTER — ANTICOAGULATION THERAPY VISIT (OUTPATIENT)
Dept: INTERNAL MEDICINE | Facility: CLINIC | Age: 81
End: 2021-07-06

## 2021-07-06 DIAGNOSIS — I48.21 PERMANENT ATRIAL FIBRILLATION (H): ICD-10-CM

## 2021-07-06 DIAGNOSIS — I48.20 CHRONIC ATRIAL FIBRILLATION (H): ICD-10-CM

## 2021-07-06 DIAGNOSIS — Z79.01 LONG TERM (CURRENT) USE OF ANTICOAGULANTS: ICD-10-CM

## 2021-07-06 LAB — INR PPP: 2.3 (ref 0.9–1.1)

## 2021-07-06 NOTE — PROGRESS NOTES
ANTICOAGULATION MANAGEMENT     Savanna Rehman 80 year old female is on warfarin with therapeutic INR result. (Goal INR 2.0-3.0)    Recent labs: (last 7 days)     07/06/21   INR 2.3*       ASSESSMENT     Source(s): Home Care/Facility Nurse       Warfarin doses taken: Warfarin taken as instructed    Diet: No new diet changes identified    New illness, injury, or hospitalization: Pt is experiencing pain in her lower abdomen. She feels it is a UTI.    Medication/supplement changes: None noted    Signs or symptoms of bleeding or clotting: No    Previous INR: Therapeutic last 2(+) visits    Additional findings: None     PLAN     Recommended plan for no diet, medication or health factor changes affecting INR     Dosing Instructions: Continue your current warfarin dose with next INR in 2 week       Summary  As of 7/6/2021    Full warfarin instructions:  3.75 mg every Wed; 2.5 mg all other days   Next INR check:  7/20/2021             Telephone call with home care nurse Evelin who verbalizes understanding and agrees to plan    Orders given to  Homecare nurse/facility to recheck    Education provided: Target INR goal and significance of current INR result    Plan made per [facility] anticoagulation protocol    Miko West RN  Anticoagulation Clinic  7/6/2021    _______________________________________________________________________     Anticoagulation Episode Summary     Current INR goal:  2.0-3.0   TTR:  57.9 % (1 y)   Target end date:  Indefinite   Send INR reminders to:  NIKKI CHASE    Indications    Permanent atrial fibrillation (H) [I48.21]  Chronic atrial fibrillation (H) [I48.20]  Long term (current) use of anticoagulants [Z79.01]           Comments:  Home care          Anticoagulation Care Providers     Provider Role Specialty Phone number    Patti Suárez MD Referring Internal Medicine [phone]

## 2021-07-11 ENCOUNTER — NURSE TRIAGE (OUTPATIENT)
Dept: INTERNAL MEDICINE | Facility: CLINIC | Age: 81
End: 2021-07-11

## 2021-07-11 ENCOUNTER — NURSE TRIAGE (OUTPATIENT)
Dept: NURSING | Facility: CLINIC | Age: 81
End: 2021-07-11

## 2021-07-11 DIAGNOSIS — E11.42 DIABETIC POLYNEUROPATHY ASSOCIATED WITH TYPE 2 DIABETES MELLITUS (H): ICD-10-CM

## 2021-07-11 DIAGNOSIS — N32.81 URGENCY-FREQUENCY SYNDROME: ICD-10-CM

## 2021-07-11 RX ORDER — OXYBUTYNIN CHLORIDE 5 MG/1
5 TABLET ORAL 2 TIMES DAILY
Qty: 90 TABLET | Refills: 1 | Status: SHIPPED | OUTPATIENT
Start: 2021-07-11 | End: 2021-07-13

## 2021-07-11 RX ORDER — GLIPIZIDE 2.5 MG/1
2.5 TABLET, EXTENDED RELEASE ORAL DAILY
Qty: 90 TABLET | Refills: 1 | Status: SHIPPED | OUTPATIENT
Start: 2021-07-11 | End: 2021-07-13

## 2021-07-11 NOTE — TELEPHONE ENCOUNTER
Patient is calling in today as her  Care doesn't have a current prescription for her oxybutynin and glipizide.   Reviewed chart and able to refill per protocol.

## 2021-07-13 RX ORDER — OXYBUTYNIN CHLORIDE 5 MG/1
TABLET ORAL
Qty: 90 TABLET | Refills: 0 | Status: SHIPPED | OUTPATIENT
Start: 2021-07-13 | End: 2021-12-17

## 2021-07-13 RX ORDER — GLIPIZIDE 2.5 MG/1
TABLET, EXTENDED RELEASE ORAL
Qty: 90 TABLET | Refills: 0 | Status: SHIPPED | OUTPATIENT
Start: 2021-07-13 | End: 2021-10-07

## 2021-07-14 ENCOUNTER — VIRTUAL VISIT (OUTPATIENT)
Dept: UROLOGY | Facility: CLINIC | Age: 81
End: 2021-07-14
Payer: COMMERCIAL

## 2021-07-14 VITALS — HEIGHT: 68 IN | BODY MASS INDEX: 39.4 KG/M2 | WEIGHT: 260 LBS

## 2021-07-14 DIAGNOSIS — N39.41 URGENCY INCONTINENCE: ICD-10-CM

## 2021-07-14 DIAGNOSIS — N32.81 URGENCY-FREQUENCY SYNDROME: ICD-10-CM

## 2021-07-14 DIAGNOSIS — E66.01 MORBID OBESITY (H): ICD-10-CM

## 2021-07-14 DIAGNOSIS — I48.20 CHRONIC ATRIAL FIBRILLATION (H): ICD-10-CM

## 2021-07-14 DIAGNOSIS — N39.0 RECURRENT UTI: Primary | ICD-10-CM

## 2021-07-14 PROCEDURE — 99214 OFFICE O/P EST MOD 30 MIN: CPT | Mod: 95 | Performed by: UROLOGY

## 2021-07-14 RX ORDER — ESTRADIOL 0.1 MG/G
CREAM VAGINAL
Qty: 42.5 G | Refills: 3 | Status: SHIPPED | OUTPATIENT
Start: 2021-07-14 | End: 2022-06-08

## 2021-07-14 ASSESSMENT — MIFFLIN-ST. JEOR: SCORE: 1707.85

## 2021-07-14 ASSESSMENT — PAIN SCALES - GENERAL: PAINLEVEL: SEVERE PAIN (7)

## 2021-07-14 NOTE — LETTER
7/14/2021       RE: Savanna Rehman  [address]  [place] [address]     Dear Colleague,    Thank you for referring your patient, Savanna Rehman, to the [facility] at [facility]. Please see a copy of my visit note below.    *SEND LINK TO CELL PHONE*    PT ON KEFLEX    Savanna is a 80 year old who is being evaluated via a billable video visit.      How would you like to obtain your AVS? MyChart  If the video visit is dropped, the invitation should be resent by: Text to cell phone: [phone]  Will anyone else be joining your video visit? No      Video Start Time: 1:11 PM    Assessment & Plan     Recurrent UTI  Stable and continue working with ID for treatment because of her multiple allergies.      Recommend trial of estrogen cream.  Discussed risks and she is willing to try    - estradiol (ESTRACE) 0.1 MG/GM vaginal cream; Please use your finger to place a large blueberry size amount in the vagina nightly for two weeks and then three times a week at night thereafter    Urgency-frequency syndrome/Urgency incontinence  Stable.  Discussed next steps in evaluation would be urodynamics, she will let us know when she wants to proceed    Chronic atrial fibrillation (H)  Stable but states she is having some evaluation for this upcoming    Morbid obesity (H)  Stable Impacts both her urinary incontinence and her risks for treatment    Deisy Wilson MD MPH  (she/her/hers)   of Urology  [facility]      Mamadou Corrales is a 80 year old who presents for the following health issues     HPI     Last seen one year ago via a virtual visit after her cystoscopy and bladder biopsy.     Over the past year she has lost several friends and family members, not to covid.  She most recently lost sibling a few days ago.    She is continuing to work with Dr Granados or infectious disease for her UTIs, this is the  first one in several months    She is excited to be getting her teeth soon (got delayed because of COVID)    Still has an appointment at [facility] for her atrial fibrillation.        Objective    Vitals - Patient Reported  Pain Score: Severe Pain (7)  Pain Loc: Abdomen      Physical Exam   GENERAL: Healthy, alert and no distress  EYES: Eyes grossly normal to inspection.  No discharge or erythema, or obvious scleral/conjunctival abnormalities.  RESP: No audible wheeze, cough, or visible cyanosis.  No visible retractions or increased work of breathing.    SKIN: Visible skin clear. No significant rash, abnormal pigmentation or lesions.  NEURO: Cranial nerves grossly intact.  Mentation and speech appropriate for age.  PSYCH: Mentation appears normal, affect normal/bright, judgement and insight intact, normal speech and appearance well-groomed.          Video-Visit Details    Type of service:  Video Visit    Video End Time:1:30 PM    Originating Location (pt. Location): Home    Distant Location (provider location):  [facility] UROLOGY CLIN[facility] [place]     Platform used for Video Visit: [employer]  Patient Care Team:  Patti Suárez MD as PCP - General (Internal Medicine)  Patti Suárez MD as Assigned PCP  Patti Suárez MD as Referring Physician (Internal Medicine)  Deisy Wilson MD as MD (Urology)  Adelaida Liu, RN as Specialty Care Coordinator (Urology)  Deisy Wilson MD as Assigned Surgical Provider  Narendra Castillo MD as Assigned Sleep Provider  Mohan Kenny MD as Assigned Heart and Vascular Provider  SELF, REFERRED

## 2021-07-14 NOTE — PROGRESS NOTES
*SEND LINK TO CELL PHONE*    PT ON KEFLEX    Savanna is a 80 year old who is being evaluated via a billable video visit.      How would you like to obtain your AVS? MyChart  If the video visit is dropped, the invitation should be resent by: Text to cell phone: [phone]  Will anyone else be joining your video visit? No      Video Start Time: 1:11 PM    Assessment & Plan     Recurrent UTI  Stable and continue working with ID for treatment because of her multiple allergies.      Recommend trial of estrogen cream.  Discussed risks and she is willing to try    - estradiol (ESTRACE) 0.1 MG/GM vaginal cream; Please use your finger to place a large blueberry size amount in the vagina nightly for two weeks and then three times a week at night thereafter    Urgency-frequency syndrome/Urgency incontinence  Stable.  Discussed next steps in evaluation would be urodynamics, she will let us know when she wants to proceed    Chronic atrial fibrillation (H)  Stable but states she is having some evaluation for this upcoming    Morbid obesity (H)  Stable Impacts both her urinary incontinence and her risks for treatment    Deisy Wilson MD MPH  (she/her/hers)   of Urology  [facility]      Mamadou Corrales is a 80 year old who presents for the following health issues     HPI     Last seen one year ago via a virtual visit after her cystoscopy and bladder biopsy.     Over the past year she has lost several friends and family members, not to covid.  She most recently lost sibling a few days ago.    She is continuing to work with Dr Granados or infectious disease for her UTIs, this is the first one in several months    She is excited to be getting her teeth soon (got delayed because of COVID)    Still has an appointment at [facility] for her atrial fibrillation.        Objective    Vitals - Patient Reported  Pain Score: Severe Pain (7)  Pain Loc: Abdomen      Physical Exam   GENERAL: Healthy, alert and no  distress  EYES: Eyes grossly normal to inspection.  No discharge or erythema, or obvious scleral/conjunctival abnormalities.  RESP: No audible wheeze, cough, or visible cyanosis.  No visible retractions or increased work of breathing.    SKIN: Visible skin clear. No significant rash, abnormal pigmentation or lesions.  NEURO: Cranial nerves grossly intact.  Mentation and speech appropriate for age.  PSYCH: Mentation appears normal, affect normal/bright, judgement and insight intact, normal speech and appearance well-groomed.          Video-Visit Details    Type of service:  Video Visit    Video End Time:1:30 PM    Originating Location (pt. Location): Home    Distant Location (provider location):  [facility] UROLOGY CLINIC [employer]     Platform used for Video Visit: [email or web address]  Patient Care Team:  Patti Suárez MD as PCP - General (Internal Medicine)  Patti Suárez MD as Assigned PCP  Patti Suárez MD as Referring Physician (Internal Medicine)  Deisy Wilson MD as MD (Urology)  Adelaida Liu, RN as Specialty Care Coordinator (Urology)  Deisy Wilson MD as Assigned Surgical Provider  Narendra Castillo MD as Assigned Sleep Provider  Mohan Kenny MD as Assigned Heart and Vascular Provider  SELF, REFERRED

## 2021-07-14 NOTE — PATIENT INSTRUCTIONS
Please call the Behavioral Access Line [phone] to set up an appointment with a Mental Health Therapist or Psychiatric Provider.    Websites with free information:    [employer] patient website: www.voicesforpfd.org    Total Control Program: www.totalcontrolprogram.com    Try the estrogen cream    Continue to work with Dr Granados for the urinary tract infections    Please let us know when you want to proceed with the urodynamics testing    It was a pleasure meeting with you today.  Thank you for allowing me and my team the privilege of caring for you today.  YOU are the reason we are here, and I truly hope we provided you with the excellent service you deserve.  Please let us know if there is anything else we can do for you so that we can be sure you are leaving completely satisfied with your care experience.

## 2021-07-21 ENCOUNTER — ANTICOAGULATION THERAPY VISIT (OUTPATIENT)
Dept: INTERNAL MEDICINE | Facility: CLINIC | Age: 81
End: 2021-07-21

## 2021-07-21 ENCOUNTER — TELEPHONE (OUTPATIENT)
Dept: SLEEP MEDICINE | Facility: CLINIC | Age: 81
End: 2021-07-21

## 2021-07-21 DIAGNOSIS — I48.21 PERMANENT ATRIAL FIBRILLATION (H): Primary | ICD-10-CM

## 2021-07-21 DIAGNOSIS — I48.20 CHRONIC ATRIAL FIBRILLATION (H): ICD-10-CM

## 2021-07-21 DIAGNOSIS — Z79.01 LONG TERM (CURRENT) USE OF ANTICOAGULANTS: ICD-10-CM

## 2021-07-21 LAB — INR PPP: 2.1

## 2021-07-21 NOTE — PROGRESS NOTES
ANTICOAGULATION MANAGEMENT     Savanna Rehman 80 year old female is on warfarin with therapeutic INR result. (Goal INR 2.0-3.0)    Recent labs: (last 7 days)     07/21/21  0000   INR 2.1       ASSESSMENT     Source(s): Chart Review and Home Care/Facility Nurse       Warfarin doses taken: Reviewed in chart    Previous INR: Therapeutic last 2(+) visits    Additional findings: None     PLAN     Recommended plan for no diet, medication or health factor changes affecting INR     Dosing Instructions: Continue your current warfarin dose with next INR in 2 weeks       Summary  As of 7/21/2021    Full warfarin instructions:  3.75 mg every Wed; 2.5 mg all other days   Next INR check:  8/4/2021             Detailed voice message left for home care nurse Raj with dosing instructions and follow up date.     Orders given to  Homecare nurse/facility to recheck    Education provided: Target INR goal and significance of current INR result, Importance of therapeutic range and Contact [phone]  with any changes, questions or concerns.     Plan made per [facility] anticoagulation protocol    Dawood Martinez RN  Anticoagulation Clinic  7/21/2021    _______________________________________________________________________     Anticoagulation Episode Summary     Current INR goal:  2.0-3.0   TTR:  62.0 % (1 y)   Target end date:  Indefinite   Send INR reminders to:  [facility]    Indications    Permanent atrial fibrillation (H) [I48.21]  Chronic atrial fibrillation (H) [I48.20]  Long term (current) use of anticoagulants [Z79.01]           Comments:  Home care          Anticoagulation Care Providers     Provider Role Specialty Phone number    Patti Suárez MD Referring Internal Medicine [phone]

## 2021-07-21 NOTE — PROGRESS NOTES
Fannie, home care nurse, [facility] for patient INR of 2.1.    Return call to [phone]

## 2021-07-21 NOTE — TELEPHONE ENCOUNTER
Reason for call:  Other   Patient called regarding (reason for call): Patient inquiring about sleep apnea device.   Additional comments: Patient saw advertisement for new sleep apnea devices and would like to discuss options for a device that would be easier to use. Patient would like to talk to Provider's nurse Angela or anyone else that is available.offered appointment but Pt declined at this time    Phone number to reach patient:  Cell number on file:    Telephone Information:   Mobile [phone]       Best Time:  any    Can we leave a detailed message on this number?  YES    Travel screening: Not Applicable

## 2021-07-23 NOTE — TELEPHONE ENCOUNTER
Returning patients phone call, patient will need to schedule an appointment to discuss, number to sleep scheduling left for patient.    Deisy Barber MA

## 2021-07-26 ENCOUNTER — TELEPHONE (OUTPATIENT)
Dept: INTERNAL MEDICINE | Facility: CLINIC | Age: 81
End: 2021-07-26

## 2021-07-27 DIAGNOSIS — Z53.9 DIAGNOSIS NOT YET DEFINED: Primary | ICD-10-CM

## 2021-07-27 PROCEDURE — 99207 PR MD RECERTIFICATION HHA PT: CPT | Performed by: INTERNAL MEDICINE

## 2021-07-28 ENCOUNTER — TELEPHONE (OUTPATIENT)
Dept: INTERNAL MEDICINE | Facility: CLINIC | Age: 81
End: 2021-07-28

## 2021-07-28 DIAGNOSIS — I48.20 CHRONIC ATRIAL FIBRILLATION (H): ICD-10-CM

## 2021-07-28 RX ORDER — WARFARIN SODIUM 2.5 MG/1
TABLET ORAL
Qty: 126 TABLET | Refills: 1 | Status: SHIPPED | OUTPATIENT
Start: 2021-07-28 | End: 2022-03-16

## 2021-07-28 NOTE — TELEPHONE ENCOUNTER
Reason for Call:  Medication or medication refill:    Do you use a [facility]?  Name of the pharmacy and phone number for the current request:  [facility] - [place], [place] - [address] [place] [address]    Name of the medication requested: warfarin ANTICOAGULANT (COUMADIN) 2.5 MG tablet    Other request: Pharmacy called requesting refill    Can we leave a detailed message on this number? YES    Phone number patient can be reached at: Other phone number:  [phone]    Best Time: anytime    Call taken on 7/28/2021 at 1:39 PM by Gretel Figueredo

## 2021-07-28 NOTE — TELEPHONE ENCOUNTER
Anticoagulation Monitoring Return Date Recheck   Latest Ref Rng & Units     7/21/2021 8/4/2021      Anticoagulation Monitoring Instructions   Latest Ref Rng & Units    7/21/2021 3.75 mg every Wed; 2.5 mg all other days   Prescription approved per [facility] Refill Protocol.  Azul Whitaker, RN  Anticoagulation Nurse - [facility]

## 2021-08-04 ENCOUNTER — ANTICOAGULATION THERAPY VISIT (OUTPATIENT)
Dept: INTERNAL MEDICINE | Facility: CLINIC | Age: 81
End: 2021-08-04

## 2021-08-04 ENCOUNTER — TELEPHONE (OUTPATIENT)
Dept: CARDIOLOGY | Facility: CLINIC | Age: 81
End: 2021-08-04

## 2021-08-04 DIAGNOSIS — I48.20 CHRONIC ATRIAL FIBRILLATION (H): ICD-10-CM

## 2021-08-04 DIAGNOSIS — Z79.01 LONG TERM (CURRENT) USE OF ANTICOAGULANTS: ICD-10-CM

## 2021-08-04 DIAGNOSIS — I48.21 PERMANENT ATRIAL FIBRILLATION (H): Primary | ICD-10-CM

## 2021-08-04 LAB — INR (EXTERNAL): 2.4 (ref 0.9–1.1)

## 2021-08-04 NOTE — CONFIDENTIAL NOTE
ANTICOAGULATION MANAGEMENT     Savanna Rehman 80 year old female is on warfarin with therapeutic INR result. (Goal INR 2.0-3.0)    Recent labs: (last 7 days)     08/04/21  1359   INR 2.4*       ASSESSMENT     Source(s): Home Care/Facility Nurse       Warfarin doses taken: Warfarin taken as instructed    Diet: No new diet changes identified    New illness, injury, or hospitalization: No    Medication/supplement changes: None noted    Signs or symptoms of bleeding or clotting: No    Previous INR: Therapeutic last 2(+) visits    Additional findings: None     PLAN     Recommended plan for no diet, medication or health factor changes affecting INR     Dosing Instructions: Continue your current warfarin dose with next INR in 2 weeks   . Suggested 3 weeks but pt feels better testing every 2 weeks.  Summary  As of 8/4/2021    Full warfarin instructions:  3.75 mg every Wed; 2.5 mg all other days   Next INR check:  8/18/2021             Telephone call with home care nurse Evelin who verbalizes understanding and agrees to plan    Orders given to  Homecare nurse/facility to recheck    Education provided: Target INR goal and significance of current INR result    Plan made per [facility] anticoagulation protocol    Miko West RN  Anticoagulation Clinic  8/4/2021    _______________________________________________________________________     Anticoagulation Episode Summary     Current INR goal:  2.0-3.0   TTR:  63.6 % (1 y)   Target end date:  Indefinite   Send INR reminders to:  NIKKI CHASE    Indications    Permanent atrial fibrillation (H) [I48.21]  Chronic atrial fibrillation (H) [I48.20]  Long term (current) use of anticoagulants [Z79.01]           Comments:  Home care          Anticoagulation Care Providers     Provider Role Specialty Phone number    Patti Suárez MD Referring Internal Medicine [phone]

## 2021-08-04 NOTE — TELEPHONE ENCOUNTER
"Call received from Dr. Kenny pt reports that she is not comfortable seeing an MELANI - she has had issues historically with seeing APPs. Pt has not had in clinic visit in over a year and this si upsetting for her. Pt reports that she is not comfortable seeing an MELANI at this time and wishes to be scheduled with Dr. Kenny only.     Pt reports over the last 3 days she has been having concerning chespressure / pain in her left sided chest which also radiated to her \"angle wing\" (scapula) in her back. Pt reports this is more pof a bothersome pain in which she feels someone is putting pressure on her heart. Pt does not monitor BP / HR at home. No associated SOB / ARDON / edema / LH / DZ. Pt reports she does have a history of anxiety and this may be a component.     Pt declined ED and states the pain is bothersome - not emergent.     Pt requesting OV at next available with Dr. Kenny in clinic, in [place], on a Thursday after 1PM. Pt scheduled appropriately.     Pt advised that her prior OV note reports she should have a Ziopatch for Afib eval. This was not completed and pt states she never heard of this. Reviewed with pt reasoning. Pt agreeable to having placed - will reach out to CP to see if able to place at time of OV tomorrow - otherwise pt will need to schedule at later date.     Cardiopulmonary contacted - pt to arrive at 1:15PM at the clinic to have Ziopatch placed per prior recommendations.   UMBERTO De RN, BSN. 08/04/21 4:17 PM   "

## 2021-08-12 ENCOUNTER — OFFICE VISIT (OUTPATIENT)
Dept: INTERNAL MEDICINE | Facility: CLINIC | Age: 81
End: 2021-08-12
Payer: COMMERCIAL

## 2021-08-12 VITALS
TEMPERATURE: 98.4 F | HEART RATE: 74 BPM | BODY MASS INDEX: 38.95 KG/M2 | RESPIRATION RATE: 18 BRPM | HEIGHT: 68 IN | WEIGHT: 257 LBS | OXYGEN SATURATION: 95 % | SYSTOLIC BLOOD PRESSURE: 135 MMHG | DIASTOLIC BLOOD PRESSURE: 72 MMHG

## 2021-08-12 DIAGNOSIS — R22.32 ARM MASS, LEFT: ICD-10-CM

## 2021-08-12 DIAGNOSIS — G62.9 PERIPHERAL POLYNEUROPATHY: ICD-10-CM

## 2021-08-12 DIAGNOSIS — E56.9 VITAMIN DEFICIENCY: ICD-10-CM

## 2021-08-12 DIAGNOSIS — E11.42 DIABETIC POLYNEUROPATHY ASSOCIATED WITH TYPE 2 DIABETES MELLITUS (H): ICD-10-CM

## 2021-08-12 DIAGNOSIS — I48.20 CHRONIC ATRIAL FIBRILLATION (H): ICD-10-CM

## 2021-08-12 DIAGNOSIS — M25.561 ACUTE PAIN OF RIGHT KNEE: Primary | ICD-10-CM

## 2021-08-12 PROBLEM — D64.9 ANEMIA: Status: ACTIVE | Noted: 2020-12-16

## 2021-08-12 PROBLEM — K21.9 GASTROESOPHAGEAL REFLUX DISEASE: Status: ACTIVE | Noted: 2020-12-16

## 2021-08-12 PROBLEM — M10.9 GOUT: Status: ACTIVE | Noted: 2020-12-16

## 2021-08-12 PROCEDURE — 99214 OFFICE O/P EST MOD 30 MIN: CPT | Performed by: INTERNAL MEDICINE

## 2021-08-12 ASSESSMENT — MIFFLIN-ST. JEOR: SCORE: 1694.24

## 2021-08-12 ASSESSMENT — PAIN SCALES - GENERAL: PAINLEVEL: EXTREME PAIN (8)

## 2021-08-12 NOTE — PROGRESS NOTES
Dr Lauren's note      Patient's instructions / PLAN:                                                        Plan:  1.  Ortho referral. . Please call [phone] to make an appointment  .  2. No changes in the meds  3. Please make a lab appointment for fasting labs    4. Please schedule  Annual Wellness in Dec - Jan  5. Be aware that sometimes it takes more than 3-4 weeks to find an appointment.   It would be advisable to schedule the appointment far in advance so you get get the care and the refills in time.          ASSESSMENT & PLAN:                                                      Med Prob: multiple drug allergies( angioedema 2015), DM 2, chr AFib on warfarin,   HLip, CRISTIANA on CPAP, Urgency-Frequency syndrome, recurrent UTI,     (W71.980) Acute pain of right knee  (primary encounter diagnosis)  Comment: After a fall  Plan: Orthopedic  Referral            (G62.9) Peripheral polyneuropathy  Comment: Possible diabetes neuropathy.  We will check vitamins B  Plan: She uses a walker    (I48.20) Chronic atrial fibrillation (H)  Comment: Rate controlled  Plan: Continue same medication    (E56.9) Vitamin deficiency  Comment: Many years ago she had vitamin B12 deficiency a few years ago she has B6 deficiency  Plan: Vitamin B12, Vitamin B6, Vitamin B1 whole blood            (E11.42) Diabetic polyneuropathy associated with type 2 diabetes mellitus (H)  Comment:   Plan: Hemoglobin A1c           (R22.32) Arm mass, left  Comment: Possible lipoma  Plan: Patient prefers just observation at this time, see below discussion    Chief complaint:                                                      Fall     SUBJECTIVE:                                                    History of present illness:      1st fall  -- she just slipped down the slippery bedsheet. The helpers couldn't helped her get up easily and she thinks her 2 toes were bended and broken  2nd Fall  -- put down the cat of 17 y, had a cocktail, then she  "felt her legs were very weak and fell. She remembers everything. Skin abrasion and bruises L forearm and L knee  -- she feels she torn or pulled some knee ligaments and wants to see ortho. I advised her to wait 1-2 weeks until she sees ortho for the swelling to go down       Denies drinking daily or heavily.  We talked about not drinking at all because of this.    L arm mass  -- above the elbow. I told her in the past and today -- it feels like a lipoma.   -- many years ago she had local surgery for tiny nodes in the same area. Scar is very well healed. ( I suspect they were lipomas too)  -- we talked about biopsy, US versus obs. She wants observation    AFib  -- virtual visit w Dr Kenny. She wants to see dr Kenny in person    perip neuropathy  -- she has no feeling in the toes  -- she would like to see a specialist        BP Readings from Last 2 Encounters:   02/22/21 (!) 160/73   02/18/21 (!) 151/74     Hemoglobin A1C (%)   Date Value   02/22/2021 6.3 (H)   09/14/2020 6.4 (H)     LDL Cholesterol Calculated (mg/dL)   Date Value   02/22/2021 107 (H)   03/04/2020 111 (H)           How many servings of fruits and vegetables do you eat daily?  2-3    On average, how many sweetened beverages do you drink each day (Examples: soda, juice, sweet tea, etc.  Do NOT count diet or artificially sweetened beverages)?   1    How many days per week do you exercise enough to make your heart beat faster? 3 or less    How many minutes a day do you exercise enough to make your heart beat faster? 9 or less    How many days per week do you miss taking your medication? 0      Review of Systems:                                                      ROS: negative for fever, chills, cough, wheezes, chest pain, shortness of breath, vomiting, abdominal pain, leg swelling         OBJECTIVE:             Physical exam:   Blood pressure 135/72, pulse 74, temperature 98.4  F (36.9  C), temperature source Oral, resp. rate 18, height 1.727 m (5' 8\"), " weight 116.6 kg (257 lb), SpO2 95 %, not currently breastfeeding.     NAD, appears comfortable  Skin: no rashes   Neck: supple, no JVD,  No thyroidmegaly. Lymph nodes nonpalpable cervical and supraclavicular.  Chest: clear to auscultation bilaterally, good respiratory effort  Heart: S1 S2, RRR, no mgr appreciated  Abdomen: soft, not tender,   Extremities: no edema, skin abrasion on the right forearm.  Bruises on the right forearm and right knee  Neurologic: A, Ox3, no focal signs appreciated    PMHx: reviewed  Past Medical History:   Diagnosis Date     Anemia     Iron Deficiency anemia     Atrial fibrillation (H)      CAD (coronary artery disease)     non-obstructive     Chronic pain     neck, low back, legs     Congestive heart failure (H)      Degenerative disk disease      Fibromyalgia      Gastro-oesophageal reflux disease      Gout      Hiatal hernia      Mumps      Neuropathy      Palpitations      Pernicious anemia      Sleep apnea     uses CPAP.     Urinary incontinence      Vitamin D deficiency       PSHx: reviewed  Past Surgical History:   Procedure Laterality Date     APPENDECTOMY       CHOLECYSTECTOMY       COLONOSCOPY  3/15/2011     CORONARY ANGIOGRAPHY ADULT ORDER       CYSTOSCOPY, BIOPSY BLADDER, COMBINED N/A 7/13/2020    Procedure: CYSTOSCOPY, WITH BLADDER BIOPSY;  Surgeon: Deisy Wilson MD;  Location: [facility] OR     HEART CATH LEFT HEART CATH  12/30/16    medication management     HYSTERECTOMY TOTAL ABDOMINAL       Knee replacement NOS Left      LAPAROSCOPIC NISSEN FUNDOPLICATION N/A 2/4/2015    Procedure: LAPAROSCOPIC NISSEN FUNDOPLICATION;  Surgeon: Armando Ansari MD;  Location: [facility] OR     TONSILLECTOMY       TRANSPOSITION ULNAR NERVE (ELBOW)          Meds: reviewed  Current Outpatient Medications   Medication Sig Dispense Refill     ACE/ARB/ARNI NOT PRESCRIBED (INTENTIONAL) Please choose reason not prescribed, below       alcohol swab prep pads Use to swab area of injection/chandrakant as directed.  100 each 3     B Complex-C (SUPER B COMPLEX PO) Take 1 tablet by mouth At Bedtime       balsalazide (COLAZAL) 750 MG capsule Take 2,250 mg by mouth 3 times daily       Biotin 5000 MCG TABS Take 5,000 mcg by mouth At Bedtime       blood glucose (NO BRAND SPECIFIED) lancets standard Use to test blood sugar 1 time daily 100 lancet 3     blood glucose (NO BRAND SPECIFIED) lancets standard Use to test blood sugar  as directed. 1 each 0     blood glucose (ONETOUCH ULTRA) test strip USE 1 STRIP TO CHECK GLUCOSE ONCE DAILY 100 strip 0     blood glucose calibration (NO BRAND SPECIFIED) solution Use to calibrate blood glucose monitor 1 Bottle 3     blood glucose monitoring (NO BRAND SPECIFIED) meter device kit Use to test blood sugar 1 times daily or as directed. 1 kit 1     blood glucose monitoring (NO BRAND SPECIFIED) meter device kit Use to test blood sugar 1 time daily 1 kit 0     butalbital-aspirin-caffeine (FIORINAL) -40 MG capsule Take 1 capsule by mouth every 6 hours as needed for headaches 30 capsule 0     cholecalciferol (VITAMIN D3) 5000 units (125 mcg) capsule Take 5,000 Units by mouth At Bedtime       EPINEPHrine (EPIPEN 2-ROBBY) 0.3 MG/0.3ML injection 2-pack Inject 0.3 mLs (0.3 mg) into the muscle once as needed for anaphylaxis 1 each 1     estradiol (ESTRACE) 0.1 MG/GM vaginal cream Please use your finger to place a large blueberry size amount in the vagina nightly for two weeks and then three times a week at night thereafter 42.5 g 3     fosfomycin (MONUROL) 3 g Packet Take 3 g by mouth once       glipiZIDE (GLUCOTROL XL) 2.5 MG 24 hr tablet TAKE 1 TABLET DAILY 90 tablet 0     meclizine (ANTIVERT) 12.5 MG tablet Take 1 tablet (12.5 mg) by mouth 3 times daily as needed for dizziness 30 tablet 1     nystatin (MYCOSTATIN) 403162 UNIT/GM external powder Apply to bilateral groin area 2x daily as needed. 30 g 1     order for DME Oxygen 2 Li/min  at night bleed with CPAP       oxybutynin (DITROPAN) 5 MG tablet  TAKE 1 TABLET TWICE A DAY 90 tablet 0     warfarin ANTICOAGULANT (COUMADIN) 2.5 MG tablet Take 1-1/2 tablets (3.75 mg) by mouth every Wednesday; and take 1 tablet (2.5 mg) all other days of the week or as directed by your [facility] Team. 126 tablet 1       Soc Hx: reviewed  Fam Hx: reviewed          Patti Lauren MD  Internal Medicine

## 2021-08-12 NOTE — PATIENT INSTRUCTIONS
Plan:  1.  Ortho referral. . Please call [phone] to make an appointment  .  2. No changes in the meds  3. Please make a lab appointment for fasting labs    4. Please schedule  Annual Wellness in Dec - Jan  5. Be aware that sometimes it takes more than 3-4 weeks to find an appointment.   It would be advisable to schedule the appointment far in advance so you get get the care and the refills in time.

## 2021-08-13 ENCOUNTER — PATIENT OUTREACH (OUTPATIENT)
Dept: UROLOGY | Facility: CLINIC | Age: 81
End: 2021-08-13

## 2021-08-13 NOTE — TELEPHONE ENCOUNTER
Patient called today to see if she can have UDS and see Dr Wilson the same day. Patient wants her daughter to attend the visit so it needs to be out far enough to have her daughter take time since she is a RN. Looks like the first time both providers are available is November.    Adelaida Liu, RN   Care Coordinator Urology

## 2021-08-18 ENCOUNTER — TELEPHONE (OUTPATIENT)
Dept: CARDIOLOGY | Facility: CLINIC | Age: 81
End: 2021-08-18

## 2021-08-18 NOTE — CONFIDENTIAL NOTE
[facility] CLIFFORD Waters called stating the pt canceled the visit today. Advised RN that Savanna can remain on current dose and recheck INR in one week on 08/25.

## 2021-08-18 NOTE — TELEPHONE ENCOUNTER
Pt called to request next available OV with Dr. Kenny. Pt expressed frustrations that she was not called after her last cancelled OV to get her rescheduled. Pt advised that at time of last call she reported having to cancel due to prior arranged plans and reported she would call to rescheduled when she had time.     Reviewed with pt the next OV available with Dr. Kenny on a Thursday between 1pm and 4pm October 14th. Pt reports she feels this is too far out. Pt advised that if she needs this specific window she may need to see alternate MD. Pt would like to have a list of dates / times of openings with providers.     Message to scheduling to assist with arranging OV and heart monitor placement.   UMBERTO De RN, BSN. 8/18/2021 10:37 AM       Call back received from pt regarding scheduling. Pt has been contacted by scheduling and left a message.  return call. Pt calling to inquire about office visit. Resent message to scheduling to reach out. Pt provided with scheduling number as Dr. Kenny does not have an opening that fits her requests.   UMBERTO De RN, BSN. .tolday 1:00 PM

## 2021-08-25 ENCOUNTER — ANTICOAGULATION THERAPY VISIT (OUTPATIENT)
Dept: INTERNAL MEDICINE | Facility: CLINIC | Age: 81
End: 2021-08-25

## 2021-08-25 DIAGNOSIS — Z79.01 LONG TERM (CURRENT) USE OF ANTICOAGULANTS: ICD-10-CM

## 2021-08-25 DIAGNOSIS — I48.20 CHRONIC ATRIAL FIBRILLATION (H): ICD-10-CM

## 2021-08-25 DIAGNOSIS — I48.21 PERMANENT ATRIAL FIBRILLATION (H): Primary | ICD-10-CM

## 2021-08-25 LAB — INR (EXTERNAL): 2.7 (ref 0.9–1.1)

## 2021-08-25 NOTE — PROGRESS NOTES
ANTICOAGULATION MANAGEMENT     Savanna Rehman 80 year old female is on warfarin with therapeutic INR result. (Goal INR 2.0-3.0)    Recent labs: (last 7 days)     08/25/21  1306   INR 2.7*       ASSESSMENT     Source(s): Chart Review and Home Care/Facility Nurse       Warfarin doses taken: Warfarin taken as instructed    Diet: No new diet changes identified    New illness, injury, or hospitalization: No    Medication/supplement changes: None noted    Signs or symptoms of bleeding or clotting: No    Previous INR: Therapeutic last 2(+) visits    Additional findings: None     PLAN     Recommended plan for no diet, medication or health factor changes affecting INR     Dosing Instructions: Continue your current warfarin dose with next INR in 3 weeks       Summary  As of 8/25/2021    Full warfarin instructions:  3.75 mg every Wed; 2.5 mg all other days   Next INR check:               Telephone call with home care nurse Raj who verbalizes understanding and agrees to plan    Orders given to  Homecare nurse/facility to recheck    Education provided: Please call back if any changes to your diet, medications or how you've been taking warfarin    Plan made per [facility] anticoagulation protocol    Azul Whitaker, RN  Anticoagulation Clinic  8/25/2021    _______________________________________________________________________     Anticoagulation Episode Summary     Current INR goal:  2.0-3.0   TTR:  64.5 % (1 y)   Target end date:  Indefinite   Send INR reminders to:  EDISON SALVATORE    Indications    Permanent atrial fibrillation (H) [I48.21]  Chronic atrial fibrillation (H) [I48.20]  Long term (current) use of anticoagulants [Z79.01]           Comments:  Home care          Anticoagulation Care Providers     Provider Role Specialty Phone number    Patti Suárez MD Referring Internal Medicine [phone]

## 2021-08-30 ENCOUNTER — TELEPHONE (OUTPATIENT)
Dept: CARDIOLOGY | Facility: CLINIC | Age: 81
End: 2021-08-30

## 2021-08-30 NOTE — TELEPHONE ENCOUNTER
Call received from pt requesting Ov with  and cardiac heart monitor placement. Pt reports she has been awaiting a call from scheduling but has not received a call. Pt additionally reports that she does not answer her phone if she does not know the number. Reviewed with pt prior recommendations from Dr. Kenny as we have in prior conversations. Dr. Kenny does not have availability that fits her needs at this time. Pt advised that if she feels she needs to be seen more urgently we can offer MELANI. Pt declined an MELANI visit. Pt agreeable to having Ziopatch placement as previously recommended per Dr. Kenny at OV on 4/20/2021. Pt reports that she wants to have an in person OV with Dr. Kenny at next available.     Message sent to scheduling to reach out to pt - pt advised he is in fact completely booked out until October. Pt is asymptomatic. She feels she needs an assessment.     advised pt of scheduling number as I am not able to schedule a Ziopatch for her. Reviewed with pt that she has limited mobility / transportation and if she felt she needs to be seen ASAP ED recommended. Pt declined. Offered next available with Aniya Kenny - pt dos not want multiple appointments for zio placement and OV - would prefer to have Ziopatch placed and have OV on same day if able. Advised pt note sent to scheduling to arrange for both appointments to be made at same time next available.     Message to scheduling with specifics.   UMBERTO De RN, BSN. 08/30/21 4:57 PM

## 2021-09-09 ENCOUNTER — TELEPHONE (OUTPATIENT)
Dept: CARDIOLOGY | Facility: CLINIC | Age: 81
End: 2021-09-09

## 2021-09-09 NOTE — TELEPHONE ENCOUNTER
"Call received from pt to report that she has to cancel her upcoming OV with Rosanna Gramajo as she has a  that she needs to attend that day. (). Additionally pt reports that she is not going to be able to wear a Ziopatch as she is allergic to adhesive tape and to Vaseline products. She states she would not be able to tolerate wearing a patch for 7 days.     Pt advised that she should be seen by a provider to review the recent sxs she has been having that she is insistent need to be reviewed with the MD. Pt reports that her prior reported palpitations have gotten better and she feels as through they may have been related to emotional distress related to loosing her cat. Additionally the pain that she was experiencing around her \"omer wing\" has subsided as well\".     Pt was at lunch at the time of the call and needed to call me back as her food was delivered. Will continue conversation upon call back.   UMBERTO De RN, BSN. 21 2:22 PM       "

## 2021-09-10 ENCOUNTER — TELEPHONE (OUTPATIENT)
Dept: INTERNAL MEDICINE | Facility: CLINIC | Age: 81
End: 2021-09-10

## 2021-09-10 NOTE — TELEPHONE ENCOUNTER
Patient has had diarrhea for a year and a half and her daughter who is a nurse suggested she have a blood test done to check her pancreas. She has other labs that were ordered that she needs to come in and complete. Call patient to advise at [phone] (home)

## 2021-09-10 NOTE — TELEPHONE ENCOUNTER
See message below and advise.  Does patient need an appointment?  GI referral?    Routed to covering team for the letter CODI

## 2021-09-11 ENCOUNTER — TELEPHONE (OUTPATIENT)
Dept: INTERNAL MEDICINE | Facility: CLINIC | Age: 81
End: 2021-09-11

## 2021-09-11 NOTE — TELEPHONE ENCOUNTER
Reason for Call: Request for an order or referral:    Order or referral being requested: Diabetes med check and also patient wants a lab ordered for pancreatic test    Date needed: as soon as possible    Has the patient been seen by the PCP for this problem? YES    Additional comments: Patient wants a call back to discuss about her Diabetes medication.    Phone number Patient can be reached at:  Cell number on file:    Telephone Information:   Mobile [phone]       Best Time:  anytime    Can we leave a detailed message on this number?  YES    Call taken on 9/11/2021 at 2:25 PM by Chava Andrea

## 2021-09-13 NOTE — TELEPHONE ENCOUNTER
Patient calling and would like to talk with a RN about the message below. She feels this is her pancreas. Ok to call and  [phone]

## 2021-09-13 NOTE — TELEPHONE ENCOUNTER
There is no blood test for the pancrease for chronic diarrhea. Blood tests are for acute pancreatic inflammation.

## 2021-09-13 NOTE — TELEPHONE ENCOUNTER
Question regarding lab test addressed in 9/10/21.     Patient aware Dr. Lauren is out of the office for a few weeks. She states she needs to have A1c checked, but concerned Glipizide is not working for her. She also has been having frequent loose stools for over 1.5 years that she has not discussed with Dr. Lauren because there are so many other health issues she has. Recommended patient discuss the loose stools and medication with a provider.     Mahsa Arteaga RN  [facility]

## 2021-09-13 NOTE — TELEPHONE ENCOUNTER
Patient advised. She states stools have been progressively getting worse and she hardly leaves the house now because of the loose stools. This RN recommended appointment for further evaluation of loose stools. Offered appointment with partner of Dr. Lauren since she is out of the office. Patient is hesitant to see another provider, states she has a GI doctor that she has seen before, Dr. Stroud, but she is an independent provider. Patient advised she could schedule an appointment with Dr. Stroud instead. Patient asks for referral to Dr. Stroud, advised patient that we have to stay within our referral network for specialist so if our office provided a referral it would likely be through [facility] or [facility].     Patient would like to check with her insurance to find out if Dr. Stroud would be covered. She requests a call back tomorrow.     Mahsa Arteaga RN  [facility]

## 2021-09-14 NOTE — TELEPHONE ENCOUNTER
This may have to wait for [facility] to decide as the lab that could be done are not appropriate in this situation

## 2021-09-14 NOTE — TELEPHONE ENCOUNTER
This RN called patient. She   She does have abdominal pain-squeezing pain around the belly button and 4 inches above the belly button, she feels pain if she presses against this area. She initially thought this pain was the result of the UTI she had, but pain is not getting better. Stools are mushy and greasy.     She really wants to have the pancreas testing done with the other labs Dr. Lauren has ordered this for her. Patient does not need a call back, she will be scheduling the lab appointment and will ask them to drawn the pancreas labs if ordered.    This RN advised patient to schedule appointment in clinic due to continue stomach pain, but she doesn't want to see a different provider.    Mahsa Arteaga RN  [facility]

## 2021-09-15 ENCOUNTER — ANTICOAGULATION THERAPY VISIT (OUTPATIENT)
Dept: ANTICOAGULATION | Facility: CLINIC | Age: 81
End: 2021-09-15

## 2021-09-15 DIAGNOSIS — I48.21 PERMANENT ATRIAL FIBRILLATION (H): Primary | ICD-10-CM

## 2021-09-15 DIAGNOSIS — Z79.01 LONG TERM (CURRENT) USE OF ANTICOAGULANTS: ICD-10-CM

## 2021-09-15 DIAGNOSIS — I48.20 CHRONIC ATRIAL FIBRILLATION (H): ICD-10-CM

## 2021-09-15 LAB — INR (EXTERNAL): 2.6 (ref 0.9–1.1)

## 2021-09-15 NOTE — PROGRESS NOTES
[facility] received today's INR result of 2.6.  No missed dose or changes reported.  Please call CLIFFORD Anthony back at #[phone]  Thanks! JAMI RodriguesN, RN

## 2021-09-15 NOTE — PROGRESS NOTES
ANTICOAGULATION MANAGEMENT     Savanna Rehman 80 year old female is on warfarin with therapeutic INR result. (Goal INR 2.0-3.0)    Recent labs: (last 7 days)     09/15/21  1428   INR 2.6*       ASSESSMENT     Source(s): Chart Review and Home Care/Facility Nurse       Warfarin doses taken: Warfarin taken as instructed    Diet: No new diet changes identified    New illness, injury, or hospitalization: No    Medication/supplement changes: None noted    Signs or symptoms of bleeding or clotting: No    Previous INR: Therapeutic last 2(+) visits    Additional findings: None     PLAN     Recommended plan for no diet, medication or health factor changes affecting INR     Dosing Instructions: Continue your current warfarin dose with next INR in 3 weeks       Summary  As of 9/15/2021    Full warfarin instructions:  3.75 mg every Wed; 2.5 mg all other days   Next INR check:               Telephone call with home care nurse Raj who verbalizes understanding and agrees to plan    Orders given to  Homecare nurse/facility to recheck    Education provided: Please call back if any changes to your diet, medications or how you've been taking warfarin    Plan made per [facility] anticoagulation protocol    Azul Whitaker, RN  Anticoagulation Clinic  9/15/2021    _______________________________________________________________________     Anticoagulation Episode Summary     Current INR goal:  2.0-3.0   TTR:  69.5 % (1 y)   Target end date:  Indefinite   Send INR reminders to:  EDISON SALVATORE    Indications    Permanent atrial fibrillation (H) [I48.21]  Chronic atrial fibrillation (H) [I48.20]  Long term (current) use of anticoagulants [Z79.01]           Comments:  Home care          Anticoagulation Care Providers     Provider Role Specialty Phone number    Patti Suárez MD Referring Internal Medicine [phone]

## 2021-09-15 NOTE — TELEPHONE ENCOUNTER
Patient wants a referral to Dr Armando Ansari because she thinks she has a hiatal hernia and a pancreas problem.

## 2021-09-21 NOTE — TELEPHONE ENCOUNTER
Patient calling back and she stated her loose stools are getting worse every day and she wants to know what she can do to help   She stated she is having the same symptoms as when her stomach moved behind her heart and she needed surgery.   Please advise. Ok to call and ishmael [phone]

## 2021-09-22 NOTE — TELEPHONE ENCOUNTER
"Patient offered 4 appointments  \"flu shot\", \"another doctor appointment\", \"hair appointment\"  Scheduled virtual appointment 10/7/21 as patient had to have a Thursday.    "

## 2021-09-27 ENCOUNTER — DOCUMENTATION ONLY (OUTPATIENT)
Dept: INTERNAL MEDICINE | Facility: CLINIC | Age: 81
End: 2021-09-27

## 2021-09-27 DIAGNOSIS — I48.21 PERMANENT ATRIAL FIBRILLATION (H): Primary | ICD-10-CM

## 2021-09-27 NOTE — PROGRESS NOTES
ANTICOAGULATION MANAGEMENT      Savanna Rehman due for annual renewal of referral to anticoagulation monitoring. Order pended for your review and signature.      ANTICOAGULATION SUMMARY      Warfarin indication(s)     Atrial fibrillation    Heart valve present?  NO       Current goal range   INR: 2.0-3.0     Goal appropriate for indication? Yes, INR 2-3 appropriate for hx of DVT, PE, hypercoagulable state, Afib, LVAD, or bileaflet AVR without risk factors     Current duration of therapy Indefinite/long term therapy   Time in Therapeutic Range (TTR)  (Goal > 60%) 69.5%       Office visit with referring provider's group within last year yes on 2/22/21       Iris Kemp RN

## 2021-09-28 ENCOUNTER — TELEPHONE (OUTPATIENT)
Dept: INTERNAL MEDICINE | Facility: CLINIC | Age: 81
End: 2021-09-28

## 2021-09-29 DIAGNOSIS — I48.20 CHRONIC ATRIAL FIBRILLATION (H): Primary | ICD-10-CM

## 2021-09-30 ENCOUNTER — TELEPHONE (OUTPATIENT)
Dept: INTERNAL MEDICINE | Facility: CLINIC | Age: 81
End: 2021-09-30

## 2021-10-01 DIAGNOSIS — Z53.9 DIAGNOSIS NOT YET DEFINED: Primary | ICD-10-CM

## 2021-10-01 PROCEDURE — 99207 PR MD RECERTIFICATION HHA PT: CPT | Performed by: INTERNAL MEDICINE

## 2021-10-03 ENCOUNTER — TELEPHONE (OUTPATIENT)
Dept: INTERNAL MEDICINE | Facility: CLINIC | Age: 81
End: 2021-10-03

## 2021-10-03 NOTE — TELEPHONE ENCOUNTER
Reason for call:  Other   Patient called regarding (reason for call):   Labs requested     Additional comments:   Patient has upcoming lab appt and wants testing to check her pancreas.     Phone number to reach patient:  Home number on file [phone] (home)    Best Time:  gisel    Can we leave a detailed message on this number?  YES    Travel screening: Not Applicable

## 2021-10-05 NOTE — TELEPHONE ENCOUNTER
Keep October 7 as a video appointment  If the patient is not doing well, she might need to go to emergency room

## 2021-10-05 NOTE — TELEPHONE ENCOUNTER
Patient has scheduled a video appointment to see primary care provider Thursday 10/7/21 with a lab appointment after.

## 2021-10-05 NOTE — TELEPHONE ENCOUNTER
"Patient has a number of concerns but has difficulty recalling what they are. States her memory is a little foggy since she just woke up.   She then goes on to stay she has had diarrhea and vomiting x3 years.  Vomiting is almost daily, Has a small amount of liquid stool every time she urinates.  \"Slimy, greasy stools\".    2 falls at home but unable to state how long ago, states she had to call paramedics to help her get up.  She was closing her patio door and she went to turn around and she passed out.  States she had 1 alcoholic beverage prior to that and had been grieving the deaths of her older brother, a good friend and her cat of 17 years.     She has scheduled a video appt for Thursday 10/7 and states she has someone that will bring her to the clinic later that day for lab appt.  Dr. Lauren, do you want her appt changed to in clinic visit?  BENITA Graham R.N.      "

## 2021-10-06 ENCOUNTER — ANTICOAGULATION THERAPY VISIT (OUTPATIENT)
Dept: INTERNAL MEDICINE | Facility: CLINIC | Age: 81
End: 2021-10-06

## 2021-10-06 DIAGNOSIS — Z79.01 LONG TERM (CURRENT) USE OF ANTICOAGULANTS: ICD-10-CM

## 2021-10-06 DIAGNOSIS — I48.21 PERMANENT ATRIAL FIBRILLATION (H): Primary | ICD-10-CM

## 2021-10-06 DIAGNOSIS — I48.20 CHRONIC ATRIAL FIBRILLATION (H): ICD-10-CM

## 2021-10-06 LAB — INR (EXTERNAL): 2.8 (ref 0.9–1.1)

## 2021-10-06 NOTE — PROGRESS NOTES
ANTICOAGULATION MANAGEMENT     Savanna Rehman 80 year old female is on warfarin with therapeutic INR result. (Goal INR 2.0-3.0)    Recent labs: (last 7 days)     10/06/21  1237   INR 2.8*       ASSESSMENT     Source(s): Chart Review and Home Care/Facility Nurse       Warfarin doses taken: Warfarin taken as instructed    Diet: No new diet changes identified    New illness, injury, or hospitalization: No    Medication/supplement changes: None noted    Signs or symptoms of bleeding or clotting: No    Previous INR: Therapeutic last 2(+) visits    Additional findings: None     PLAN     Recommended plan for no diet, medication or health factor changes affecting INR     Dosing Instructions: Continue your current warfarin dose with next INR in 4 weeks       Summary  As of 10/6/2021    Full warfarin instructions:  3.75 mg every Wed; 2.5 mg all other days   Next INR check:               Telephone call with home care nurse jennifer who verbalizes understanding and agrees to plan    Orders given to  Homecare nurse/facility to recheck    Education provided: Please call back if any changes to your diet, medications or how you've been taking warfarin    Plan made per [facility] anticoagulation protocol    Iris Kemp RN  Anticoagulation Clinic  10/6/2021    _______________________________________________________________________     Anticoagulation Episode Summary     Current INR goal:  2.0-3.0   TTR:  70.4 % (1 y)   Target end date:  Indefinite   Send INR reminders to:  NIKKI CHASE    Indications    Permanent atrial fibrillation (H) [I48.21]  Chronic atrial fibrillation (H) [I48.20]  Long term (current) use of anticoagulants [Z79.01]           Comments:  Home care          Anticoagulation Care Providers     Provider Role Specialty Phone number    Patti Suárez MD Referring Internal Medicine [phone]

## 2021-10-06 NOTE — TELEPHONE ENCOUNTER
"Patient advised and she doesn't want to go to the ED and \"start with a brand new doctor\". SHe will keep her video appointment.  "

## 2021-10-07 ENCOUNTER — LAB (OUTPATIENT)
Dept: LAB | Facility: CLINIC | Age: 81
End: 2021-10-07
Payer: COMMERCIAL

## 2021-10-07 ENCOUNTER — VIRTUAL VISIT (OUTPATIENT)
Dept: INTERNAL MEDICINE | Facility: CLINIC | Age: 81
End: 2021-10-07
Payer: COMMERCIAL

## 2021-10-07 DIAGNOSIS — E11.42 DIABETIC POLYNEUROPATHY ASSOCIATED WITH TYPE 2 DIABETES MELLITUS (H): ICD-10-CM

## 2021-10-07 DIAGNOSIS — E11.9 TYPE 2 DIABETES MELLITUS WITHOUT COMPLICATION, WITHOUT LONG-TERM CURRENT USE OF INSULIN (H): ICD-10-CM

## 2021-10-07 DIAGNOSIS — K52.9 CHRONIC DIARRHEA: ICD-10-CM

## 2021-10-07 DIAGNOSIS — N18.31 STAGE 3A CHRONIC KIDNEY DISEASE (H): ICD-10-CM

## 2021-10-07 DIAGNOSIS — E56.9 VITAMIN DEFICIENCY: ICD-10-CM

## 2021-10-07 DIAGNOSIS — K52.9 CHRONIC DIARRHEA: Primary | ICD-10-CM

## 2021-10-07 PROBLEM — N18.30 CHRONIC KIDNEY DISEASE, STAGE 3 (H): Status: ACTIVE | Noted: 2021-10-07

## 2021-10-07 LAB
ERYTHROCYTE [DISTWIDTH] IN BLOOD BY AUTOMATED COUNT: 14 % (ref 10–15)
HBA1C MFR BLD: 6.4 % (ref 0–5.6)
HCT VFR BLD AUTO: 43.3 % (ref 35–47)
HGB BLD-MCNC: 14.4 G/DL (ref 11.7–15.7)
MCH RBC QN AUTO: 30.5 PG (ref 26.5–33)
MCHC RBC AUTO-ENTMCNC: 33.3 G/DL (ref 31.5–36.5)
MCV RBC AUTO: 92 FL (ref 78–100)
PLATELET # BLD AUTO: 174 10E3/UL (ref 150–450)
RBC # BLD AUTO: 4.72 10E6/UL (ref 3.8–5.2)
VIT B12 SERPL-MCNC: 559 PG/ML (ref 193–986)
WBC # BLD AUTO: 5.3 10E3/UL (ref 4–11)

## 2021-10-07 PROCEDURE — 99214 OFFICE O/P EST MOD 30 MIN: CPT | Mod: 95 | Performed by: INTERNAL MEDICINE

## 2021-10-07 PROCEDURE — 84207 ASSAY OF VITAMIN B-6: CPT | Mod: 90

## 2021-10-07 PROCEDURE — 80053 COMPREHEN METABOLIC PANEL: CPT

## 2021-10-07 PROCEDURE — 84425 ASSAY OF VITAMIN B-1: CPT | Mod: 90

## 2021-10-07 PROCEDURE — 85027 COMPLETE CBC AUTOMATED: CPT

## 2021-10-07 PROCEDURE — 36415 COLL VENOUS BLD VENIPUNCTURE: CPT

## 2021-10-07 PROCEDURE — 83036 HEMOGLOBIN GLYCOSYLATED A1C: CPT

## 2021-10-07 PROCEDURE — 82607 VITAMIN B-12: CPT

## 2021-10-07 PROCEDURE — 99000 SPECIMEN HANDLING OFFICE-LAB: CPT

## 2021-10-07 RX ORDER — GLIPIZIDE 2.5 MG/1
2.5 TABLET, EXTENDED RELEASE ORAL 2 TIMES DAILY
Qty: 180 TABLET | Refills: 0 | Status: SHIPPED | OUTPATIENT
Start: 2021-10-07 | End: 2021-10-08

## 2021-10-07 NOTE — PROGRESS NOTES
Savanna is a 80 year old who is being evaluated via a billable video visit.      How would you like to obtain your AVS? Mail a copy  If the video visit is dropped, the invitation should be resent by: Text to cell phone: [phone]  Will anyone else be joining your video visit? No       This is a VIDEO ( using Doximity)  encounter with the patient.       Location of the provider : office   Location of the patient : home      09:56 --- 09:59 - first attempt     09:59 -- 10:20              Dr Lauren's note      Patient's instructions / PLAN:                                                        Plan:  1. Stool test.  Labs today - [address]   2. Take Imodium 1 mg ( 1/2 tablet) daily in am   3. Increase Glipizide to 2.5 mg twice a day   4. 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 Patient would like to discuss her glipizide not working    9 years ago I had to have my  Diarrhea  -- x 3 y  -- after she eats or urinate she has little BM  -- she can tolerate only carbs. She has tried rice but she vomited   -- the Epic shows balsalazide ( for ulcerative colitis) but she is not aware of this medication  -- colonoscopy incomplete 2015. See below  --  colonography: normal  See below    -- no blood in stools or urine  -- takes Imodium and it helps                 DM  -- BS are getting higher: 160 yesterday     Diabetes Follow-up    How often are you checking your blood sugar? Two times daily  Blood sugar testing frequency justification:  Uncontrolled diabetes  What time of day are you checking your blood sugars (select all that apply)?  Before meals  Have you had any blood sugars above 200?  No  Have you had any blood sugars below 70?  No    What symptoms do you notice when your blood sugar is low?  None    What concerns do you have today about your diabetes? None and Other: medication, increased blood sugar levels.     Do you have any of these symptoms? (Select all that apply)  Numbness in feet    Have you had a diabetic eye exam in the last 12 months? No        BP Readings from Last 2 Encounters:   08/12/21 135/72   02/22/21 (!) 160/73     Hemoglobin A1C (%)   Date Value   02/22/2021 6.3 (H)   09/14/2020 6.4 (H)     LDL Cholesterol Calculated (mg/dL)   Date Value   02/22/2021 107 (H)   03/04/2020 111 (H)                 How many servings of fruits and vegetables do you eat daily?  0-1    On average, how many sweetened beverages do you drink each day (Examples: soda, juice, sweet tea, etc.  Do NOT count diet or artificially sweetened beverages)?   0    How many days per week do you exercise enough to make your heart beat faster? 3 or less    How many minutes a day do you exercise enough to make your heart beat faster? 9 or less    How many days per week do you miss taking your medication? 0        Review of Systems:                                                      ROS: negative for fever, chills, cough, wheezes, chest pain, shortness of breath, vomiting, abdominal pain, leg swelling      OBJECTIVE:           An actual physical exam can't be done during phone visit   A limited exam can sometimes be performed by video visit   NAD       PMHx: reviewed  Past  Medical History:   Diagnosis Date     Anemia     Iron Deficiency anemia     Atrial fibrillation (H)      CAD (coronary artery disease)     non-obstructive     Chronic pain     neck, low back, legs     Congestive heart failure (H)      Degenerative disk disease      Fibromyalgia      Gastro-oesophageal reflux disease      Gout      Hiatal hernia      Mumps      Neuropathy      Palpitations      Pernicious anemia      Sleep apnea     uses CPAP.     Urinary incontinence      Vitamin D deficiency       PSHx: reviewed  Past Surgical History:   Procedure Laterality Date     APPENDECTOMY       CHOLECYSTECTOMY       COLONOSCOPY  3/15/2011     CORONARY ANGIOGRAPHY ADULT ORDER       CYSTOSCOPY, BIOPSY BLADDER, COMBINED N/A 7/13/2020    Procedure: CYSTOSCOPY, WITH BLADDER BIOPSY;  Surgeon: Deisy Wislon MD;  Location: [facility]     HEART CATH LEFT HEART CATH  12/30/16    medication management     HYSTERECTOMY TOTAL ABDOMINAL       Knee replacement NOS Left      LAPAROSCOPIC NISSEN FUNDOPLICATION N/A 2/4/2015    Procedure: LAPAROSCOPIC NISSEN FUNDOPLICATION;  Surgeon: Armando Ansari MD;  Location: [facility] OR     TONSILLECTOMY       TRANSPOSITION ULNAR NERVE (ELBOW)          Meds: reviewed  Current Outpatient Medications   Medication Sig Dispense Refill     ACE/ARB/ARNI NOT PRESCRIBED (INTENTIONAL) Please choose reason not prescribed, below       alcohol swab prep pads Use to swab area of injection/chandrakant as directed. 100 each 3     B Complex-C (SUPER B COMPLEX PO) Take 1 tablet by mouth At Bedtime       balsalazide (COLAZAL) 750 MG capsule Take 2,250 mg by mouth 3 times daily       Biotin 5000 MCG TABS Take 5,000 mcg by mouth At Bedtime       blood glucose (NO BRAND SPECIFIED) lancets standard Use to test blood sugar 1 time daily 100 lancet 3     blood glucose (NO BRAND SPECIFIED) lancets standard Use to test blood sugar  as directed. 1 each 0     blood glucose calibration (NO BRAND SPECIFIED) solution Use to calibrate blood  glucose monitor 1 Bottle 3     blood glucose monitoring (NO BRAND SPECIFIED) meter device kit Use to test blood sugar 1 times daily or as directed. 1 kit 1     blood glucose monitoring (NO BRAND SPECIFIED) meter device kit Use to test blood sugar 1 time daily 1 kit 0     butalbital-aspirin-caffeine (FIORINAL) -40 MG capsule Take 1 capsule by mouth every 6 hours as needed for headaches 30 capsule 0     cholecalciferol (VITAMIN D3) 5000 units (125 mcg) capsule Take 5,000 Units by mouth At Bedtime       EPINEPHrine (EPIPEN 2-ROBBY) 0.3 MG/0.3ML injection 2-pack Inject 0.3 mLs (0.3 mg) into the muscle once as needed for anaphylaxis 1 each 1     estradiol (ESTRACE) 0.1 MG/GM vaginal cream Please use your finger to place a large blueberry size amount in the vagina nightly for two weeks and then three times a week at night thereafter 42.5 g 3     fosfomycin (MONUROL) 3 g Packet Take 3 g by mouth once       glipiZIDE (GLUCOTROL XL) 2.5 MG 24 hr tablet TAKE 1 TABLET DAILY 90 tablet 0     meclizine (ANTIVERT) 12.5 MG tablet Take 1 tablet (12.5 mg) by mouth 3 times daily as needed for dizziness 30 tablet 1     nystatin (MYCOSTATIN) 988314 UNIT/GM external powder Apply to bilateral groin area 2x daily as needed. 30 g 1     ONETOUCH ULTRA test strip USE 1 STRIP TO CHECK GLUCOSE ONCE DAILY 100 strip 11     order for DME Oxygen 2 Li/min  at night bleed with CPAP       oxybutynin (DITROPAN) 5 MG tablet TAKE 1 TABLET TWICE A DAY 90 tablet 0     warfarin ANTICOAGULANT (COUMADIN) 2.5 MG tablet Take 1-1/2 tablets (3.75 mg) by mouth every Wednesday; and take 1 tablet (2.5 mg) all other days of the week or as directed by your [facility] Team. 126 tablet 1       Soc Hx: reviewed  Fam Hx: reviewed          Patti Lauren MD  Internal Medicine

## 2021-10-07 NOTE — PATIENT INSTRUCTIONS
Plan:  1. Stool test.  Labs today - [address]   2. Take Imodium 1 mg ( 1/2 tablet) daily in am   3. Increase Glipizide to 2.5 mg twice a day   4. Keep the appointment on Nov 26

## 2021-10-08 ENCOUNTER — NURSE TRIAGE (OUTPATIENT)
Dept: NURSING | Facility: CLINIC | Age: 81
End: 2021-10-08

## 2021-10-08 ENCOUNTER — TELEPHONE (OUTPATIENT)
Dept: INTERNAL MEDICINE | Facility: CLINIC | Age: 81
End: 2021-10-08

## 2021-10-08 ENCOUNTER — LAB (OUTPATIENT)
Dept: LAB | Facility: CLINIC | Age: 81
End: 2021-10-08
Payer: COMMERCIAL

## 2021-10-08 DIAGNOSIS — E11.65 TYPE 2 DIABETES MELLITUS WITH HYPERGLYCEMIA, WITHOUT LONG-TERM CURRENT USE OF INSULIN (H): ICD-10-CM

## 2021-10-08 DIAGNOSIS — E11.42 DIABETIC POLYNEUROPATHY ASSOCIATED WITH TYPE 2 DIABETES MELLITUS (H): ICD-10-CM

## 2021-10-08 DIAGNOSIS — R19.7 DIARRHEA: ICD-10-CM

## 2021-10-08 DIAGNOSIS — N39.0 RECURRENT UTI: ICD-10-CM

## 2021-10-08 LAB
ALBUMIN SERPL-MCNC: 3.4 G/DL (ref 3.4–5)
ALP SERPL-CCNC: 87 U/L (ref 40–150)
ALT SERPL W P-5'-P-CCNC: 36 U/L (ref 0–50)
ANION GAP SERPL CALCULATED.3IONS-SCNC: 9 MMOL/L (ref 3–14)
AST SERPL W P-5'-P-CCNC: 45 U/L (ref 0–45)
BILIRUB SERPL-MCNC: 0.6 MG/DL (ref 0.2–1.3)
BUN SERPL-MCNC: 14 MG/DL (ref 7–30)
CALCIUM SERPL-MCNC: 9.2 MG/DL (ref 8.5–10.1)
CHLORIDE BLD-SCNC: 106 MMOL/L (ref 94–109)
CO2 SERPL-SCNC: 23 MMOL/L (ref 20–32)
CREAT SERPL-MCNC: 0.85 MG/DL (ref 0.52–1.04)
GFR SERPL CREATININE-BSD FRML MDRD: 66 ML/MIN/1.73M2
GLUCOSE BLD-MCNC: 138 MG/DL (ref 70–99)
POTASSIUM BLD-SCNC: 4.3 MMOL/L (ref 3.4–5.3)
PROT SERPL-MCNC: 7.8 G/DL (ref 6.8–8.8)
SODIUM SERPL-SCNC: 138 MMOL/L (ref 133–144)

## 2021-10-08 PROCEDURE — 87493 C DIFF AMPLIFIED PROBE: CPT

## 2021-10-08 PROCEDURE — 82043 UR ALBUMIN QUANTITATIVE: CPT

## 2021-10-08 PROCEDURE — 87506 IADNA-DNA/RNA PROBE TQ 6-11: CPT

## 2021-10-08 RX ORDER — GLIPIZIDE 2.5 MG/1
2.5 TABLET, EXTENDED RELEASE ORAL 2 TIMES DAILY
Qty: 180 TABLET | Refills: 0 | Status: SHIPPED | OUTPATIENT
Start: 2021-10-08 | End: 2022-01-18

## 2021-10-08 NOTE — TELEPHONE ENCOUNTER
Refill of Glipizide was sent to [facility] yesterday and should have been sent to [facility].  Please correct and send to [facility] asap.

## 2021-10-09 ENCOUNTER — NURSE TRIAGE (OUTPATIENT)
Dept: NURSING | Facility: CLINIC | Age: 81
End: 2021-10-09

## 2021-10-09 DIAGNOSIS — N39.0 RECURRENT UTI: ICD-10-CM

## 2021-10-09 DIAGNOSIS — A04.72 C. DIFFICILE COLITIS: Primary | ICD-10-CM

## 2021-10-09 LAB
C COLI+JEJUNI+LARI FUSA STL QL NAA+PROBE: NOT DETECTED
C DIFF TOX B STL QL: POSITIVE
CREAT UR-MCNC: 69 MG/DL
EC STX1 GENE STL QL NAA+PROBE: NOT DETECTED
EC STX2 GENE STL QL NAA+PROBE: NOT DETECTED
MICROALBUMIN UR-MCNC: 14 MG/L
MICROALBUMIN/CREAT UR: 20.29 MG/G CR (ref 0–25)
NOROV GI+II ORF1-ORF2 JNC STL QL NAA+PR: NOT DETECTED
PYRIDOXAL PHOS SERPL-SCNC: 19.7 NMOL/L
RVA NSP5 STL QL NAA+PROBE: NOT DETECTED
SALMONELLA SP RPOD STL QL NAA+PROBE: NOT DETECTED
SHIGELLA SP+EIEC IPAH STL QL NAA+PROBE: NOT DETECTED
V CHOL+PARA RFBL+TRKH+TNAA STL QL NAA+PR: NOT DETECTED
Y ENTERO RECN STL QL NAA+PROBE: NOT DETECTED

## 2021-10-09 NOTE — TELEPHONE ENCOUNTER
Patient is calling to see if her urine specimen results are back yet. Results are still in process as of 11:49am. Patient was informed and will call back Monday.      Reason for Disposition    Caller requesting lab results    Protocols used: PCP CALL - NO TRIAGE-TREASURE-ZOË Ventura RN  10/09/21 11:51 AM  [facility] Nurse Advisor

## 2021-10-09 NOTE — TELEPHONE ENCOUNTER
Triage Call: Patient is calling for her lab results for her stool and urine sample. No results noted in her chart as of 10:49pm, labs state they are in process still. Patient stated understanding and will call back tomorrow for the results.     Aislinn Wild, RN Nursing Advisor 10/8/2021 10:50 PM     Reason for Disposition    Caller requesting lab results    Additional Information    Negative: Lab calling with strep throat test results and triager can call in prescription    Negative: Lab calling with urinalysis test results and triager can call in prescription    Negative: Medication questions    Negative: ED call to PCP    Negative: Physician call to PCP    Negative: Call about patient who is currently hospitalized    Negative: Lab or radiology calling with CRITICAL test results    Negative: [1] Prescription not at pharmacy AND [2] was prescribed today by PCP    Negative: [1] Follow-up call from patient regarding patient's clinical status AND [2] information urgent    Negative: [1] Caller requests to speak ONLY to PCP AND [2] urgent question    Negative: [1] Caller requests to speak to PCP now AND [2] won't tell us reason for call  (Exception: if 10 pm to 6 am, caller must first discuss reason for the call)    Negative: Notification of hospital admission    Negative: Notification of death    Protocols used: [facility]

## 2021-10-10 RX ORDER — METRONIDAZOLE 500 MG/1
500 TABLET ORAL 3 TIMES DAILY
Qty: 42 TABLET | Refills: 0 | Status: SHIPPED | OUTPATIENT
Start: 2021-10-10 | End: 2021-10-24

## 2021-10-10 NOTE — TELEPHONE ENCOUNTER
Clinic Action Needed:Yes    Reason for Call:  Patient calling back requesting lab results.   Patient stating she is waiting on UA/UC/stool samples/and blood work.  Per Virtual Visit 10/7/21.    K52.9) Chronic diarrhea  (primary encounter diagnosis)  Comment:   Plan: Enteric Bacteria and Virus Panel by JORDAN Stool,         Clostridium difficile Toxin B PCR      Per Epic C-diff positive.   C Difficile Toxin B by PCR Negative PositiveAbnormal       Reviewed Albumin Urine Sample Lab Result no UA/UC listed per 10/7/21 or 10/8/21.    1115 a.m. Paged on call Dr Isaac for [facility].     Dr Isaac advised to treat for c-diff.  Flagyl 500 mg 3 times per day x 2 weeks take with food.  Order UA microscopic no reflex /advised separate urine culture order reflux.  Message to PCP.    1130 a.m. Placed call back to Dr Isaac through [facility] on call provider. Reviewed drug interaction on Warfarin and Flagyl.  Dr Isaac advised over ride benefits outweigh risks.   Reviewed patient is followed by Infectious Disease.    Dr Isaac Advised to route message to Dr Wilson and Dr Lauren Care Team to follow up with on 10/11/21.     Advised to have patient contact clinic regarding checking INR toward end of next week.     Patient notified.  Advised message would be routed to PCP Care Team for 10/11/21.    Chela Salazar, CLIFFORD  [facility]            Additional Information    Negative: Lab calling with strep throat test results and triager can call in prescription    Negative: Lab calling with urinalysis test results and triager can call in prescription    Negative: Medication questions    Negative: ED call to PCP    Negative: Physician call to PCP    Negative: Call about patient who is currently hospitalized    Negative: Lab or radiology calling with CRITICAL test results    Negative: [1] Prescription not at pharmacy AND [2] was prescribed today by PCP    Negative: [1] Follow-up call from patient regarding  patient's clinical status AND [2] information urgent    Negative: [1] Caller requests to speak ONLY to PCP AND [2] urgent question    Negative: [1] Caller requests to speak to PCP now AND [2] won't tell us reason for call  (Exception: if 10 pm to 6 am, caller must first discuss reason for the call)    Negative: Notification of hospital admission    Negative: Notification of death    Protocols used: PCP CALL - NO TRIAGE-A-

## 2021-10-11 ENCOUNTER — DOCUMENTATION ONLY (OUTPATIENT)
Dept: INTERNAL MEDICINE | Facility: CLINIC | Age: 81
End: 2021-10-11

## 2021-10-11 DIAGNOSIS — I48.20 CHRONIC ATRIAL FIBRILLATION (H): ICD-10-CM

## 2021-10-11 DIAGNOSIS — Z79.01 LONG TERM (CURRENT) USE OF ANTICOAGULANTS: ICD-10-CM

## 2021-10-11 DIAGNOSIS — I48.21 PERMANENT ATRIAL FIBRILLATION (H): Primary | ICD-10-CM

## 2021-10-11 LAB — VIT B1 PYROPHOSHATE BLD-SCNC: 107 NMOL/L

## 2021-10-11 RX ORDER — VANCOMYCIN HYDROCHLORIDE 125 MG/1
125 CAPSULE ORAL 4 TIMES DAILY
Qty: 56 CAPSULE | Refills: 0 | Status: SHIPPED | OUTPATIENT
Start: 2021-10-11 | End: 2021-10-25

## 2021-10-11 NOTE — TELEPHONE ENCOUNTER
Has had 3 doses of Flagyl and seems to be tolerating it well. Feeling better already, using Imodium 1/2 tab twice daily and is no longer having diarrhea every time she urinates.  Advised that there is a Vancomycin rx on file for her at [facility] if she doesn;t tolerate the Flagyl but that she is not to take both of these meds. Patient expresses understanding.  She will give a UA sample to her home care nurse this week when she has an INR check.  BENITA Graham R.N.

## 2021-10-11 NOTE — TELEPHONE ENCOUNTER
C Diff positive    Dr Isaac sent prescription for Metronidazole yesterday  If she can tolerate the Metronidazole she should continue it.  If she doesn't tolerate the metronidazole I sent a prescription for Vancomycin po. ( it might be more expensive)  Do not take both in the same time

## 2021-10-11 NOTE — PROGRESS NOTES
ANTICOAGULATION  MANAGEMENT     Interacting Medication Review    Interacting medication(s): Metronidazole (Flagyl) with warfarin.    Duration: 14 days  (10/10/2021 to 10/24/2021)    Indication: C diff    New medication?: Yes, interaction may increase INR and risk of bleeding       PLAN     Continue current warfarin dose. Recommend to check INR on 10/13/2021    Spoke with Sanchez HORTON. Roseline [phone]     Anticoagulation Calendar updated    Miko West RN

## 2021-10-13 ENCOUNTER — ANTICOAGULATION THERAPY VISIT (OUTPATIENT)
Dept: INTERNAL MEDICINE | Facility: CLINIC | Age: 81
End: 2021-10-13

## 2021-10-13 DIAGNOSIS — I48.21 PERMANENT ATRIAL FIBRILLATION (H): Primary | ICD-10-CM

## 2021-10-13 DIAGNOSIS — Z79.01 LONG TERM (CURRENT) USE OF ANTICOAGULANTS: ICD-10-CM

## 2021-10-13 DIAGNOSIS — I48.20 CHRONIC ATRIAL FIBRILLATION (H): ICD-10-CM

## 2021-10-13 LAB — INR (EXTERNAL): 2.8 (ref 0.9–1.1)

## 2021-10-13 NOTE — PROGRESS NOTES
ANTICOAGULATION MANAGEMENT     Savanna Rehman 80 year old female is on warfarin with therapeutic INR result. (Goal INR 2.0-3.0)    Recent labs: (last 7 days)     10/13/21  1234   INR 2.8*       ASSESSMENT     Source(s): Chart Review and Home Care/Facility Nurse       Warfarin doses taken: Warfarin taken as instructed    Diet: No new diet changes identified    New illness, injury, or hospitalization: Yes bacterial infection    Medication/supplement changes: metronidazole and vancomycin 10/     Signs or symptoms of bleeding or clotting: No    Previous INR: Therapeutic last visit; previously outside of goal range    Additional findings: None     PLAN     Recommended plan for temporary change(s) affecting INR     Dosing Instructions: Continue your current warfarin dose with next INR in 1 week       Summary  As of 10/13/2021    Full warfarin instructions:  3.75 mg every Wed; 2.5 mg all other days   Next INR check:               Telephone call with home care nurse Roseline who verbalizes understanding and agrees to plan    Orders given to  Homecare nurse/facility to recheck    Education provided: Please call back if any changes to your diet, medications or how you've been taking warfarin    Plan made per [facility] anticoagulation protocol    Iris Kemp RN  Anticoagulation Clinic  10/13/2021    _______________________________________________________________________     Anticoagulation Episode Summary     Current INR goal:  2.0-3.0   TTR:  72.3 % (1 y)   Target end date:  Indefinite   Send INR reminders to:  NIKKI CHASE    Indications    Permanent atrial fibrillation (H) [I48.21]  Chronic atrial fibrillation (H) [I48.20]  Long term (current) use of anticoagulants [Z79.01]           Comments:  Home care          Anticoagulation Care Providers     Provider Role Specialty Phone number    Patti Suárez MD Referring Internal Medicine [phone]

## 2021-10-15 ENCOUNTER — TELEPHONE (OUTPATIENT)
Dept: INTERNAL MEDICINE | Facility: CLINIC | Age: 81
End: 2021-10-15

## 2021-10-17 ENCOUNTER — NURSE TRIAGE (OUTPATIENT)
Dept: NURSING | Facility: CLINIC | Age: 81
End: 2021-10-17

## 2021-10-17 DIAGNOSIS — R11.0 NAUSEA: Primary | ICD-10-CM

## 2021-10-17 NOTE — TELEPHONE ENCOUNTER
Pt is calling.    Pt has C.Diff x 10 days. She stated that she is feeling worse over the last 3 days. Complaining of nausea. Very sick to her stomach. Thinks that it is due to the antibiotics. No longer vomiting. Diarrhea is not as severe. Denies fever. She is eating crackers. Was able to drink 36 oz today.   Has been trying to drink as much fluids as possible but isn't able to drink too much. Mouth is dry. Unsure when she last urinated as she wears adult diapers. They are wet at times throughout the day.  Feeling dizzy at times.  Was drinking Diet Coke.  Encouraged her to switch to water, Propel with electrolytes, soups, Gatorade.  Continue to try to eat toast, crackers, BRAT diet.    Pt is requesting phone appointment. Unable to come in.  Wondering if there is anything that can be done for the nausea. She stated that it is horrible and hard to bear. Nausea is bad.     I advised her that there are no appointments available this week. I will send a message to her care team, and have them give her a call back tomorrow after speaking with the physician, to see if anything else can be done such as medication to help with the nausea.  She will wait for a call back on Monday, tomorrow.      Reason for Disposition    Taking prescription medication that could cause nausea (e.g., narcotics/opiates, antibiotics, OCPs, many others)    Additional Information    Negative: Shock suspected (e.g., cold/pale/clammy skin, too weak to stand, low BP, rapid pulse)    Negative: Sounds like a life-threatening emergency to the triager    Negative: [1] Nausea or vomiting AND [2] pregnancy < 20 weeks    Negative: Menstrual Period - Missed or Late (i.e., pregnancy suspected)    Negative: Heat exhaustion suspected (i.e., dehydration from heat exposure)    Negative: Motion sickness suspected (i.e., nausea with car, plane, boat, or train travel)    Negative: Anxiety or stress suspected (i.e., nausea with anxiety attacks or stressful  situations)    Negative: Traumatic Brain Injury (TBI) suspected    Negative: Nausea (or Vomiting) in a cancer patient who is currently (or recently) receiving chemotherapy or radiation therapy, or cancer patient who has metastatic or end-stage cancer and is receiving palliative care    Negative: Vomiting occurs    Negative: Other symptom is present, see that guideline.  (e.g., chest pain, headache, dizziness, abdominal pain, colds, sore throat, etc.).    Negative: Unable to walk, or can only walk with assistance (e.g., requires support)    Negative: Difficulty breathing    Negative: [1] Insulin-dependent diabetes (Type I) AND [2] glucose > 400 mg/dl (22 mmol/l)    Negative: [1] Drinking very little AND [2] dehydration suspected (e.g., no urine > 12 hours, very dry mouth, very lightheaded)    Negative: Patient sounds very sick or weak to the triager    Negative: Fever > 104 F (40 C)    Negative: [1] Fever > 101 F (38.3 C) AND [2] age > 60    Negative: [1] Fever > 100.0 F (37.8 C) AND [2] bedridden (e.g., nursing home patient, CVA, chronic illness, recovering from surgery)    Negative: [1] Fever > 100.0 F (37.8 C) AND [2] diabetes mellitus or weak immune system (e.g., HIV positive, cancer chemo, splenectomy, organ transplant, chronic steroids)    Negative: Taking any of the following medications: digoxin (Lanoxin), lithium, theophylline, phenytoin (Dilantin)    Negative: Yellowish color of the skin or white of the eye (i.e., jaundice)    Negative: Fever present > 3 days (72 hours)    Protocols used: NAUSEA-A-AH    Dary Baugh RN  [facility] Nurse Advisor  10/17/2021 at 6:11 PM

## 2021-10-18 ENCOUNTER — MEDICAL CORRESPONDENCE (OUTPATIENT)
Dept: HEALTH INFORMATION MANAGEMENT | Facility: CLINIC | Age: 81
End: 2021-10-18
Payer: COMMERCIAL

## 2021-10-18 RX ORDER — ONDANSETRON 8 MG/1
8 TABLET, FILM COATED ORAL EVERY 8 HOURS PRN
Qty: 30 TABLET | Refills: 0 | Status: SHIPPED | OUTPATIENT
Start: 2021-10-18 | End: 2022-10-20

## 2021-10-18 NOTE — TELEPHONE ENCOUNTER
Patient states that she continues to take the Metronidazole, the Vancomycin was only if she did not tolerate the Metronidazole.   She refuses to stop the Metronidazole and switch to Vanco as she wants to follow through on what she started and treat the C. Diff.  She is not vomiting but does feel nauseated frequently, has urinated 2 times so far today, is constantly taking sips of her bottle of water next to her and feels she is on a good course.  She refuses to schedule an appointment.  She is checking her fasting blood sugar every other day, it was 129 before breakfast 10/17/21 which is where she has been running in recent days.  She would not turn down an offer of an antiemetic ([facility]  BENITA Graham R.N.

## 2021-10-18 NOTE — TELEPHONE ENCOUNTER
RN    1. Pharma not chosen -- please sign Rx for zofran    2. The writer didn't specify which antibiotic : metronidazole or vanco     3. [facility] has no openings. You will need to look at different providers schedules

## 2021-10-19 ENCOUNTER — NURSE TRIAGE (OUTPATIENT)
Dept: NURSING | Facility: CLINIC | Age: 81
End: 2021-10-19

## 2021-10-20 ENCOUNTER — TELEPHONE (OUTPATIENT)
Dept: INTERNAL MEDICINE | Facility: CLINIC | Age: 81
End: 2021-10-20

## 2021-10-20 ENCOUNTER — ANTICOAGULATION THERAPY VISIT (OUTPATIENT)
Dept: INTERNAL MEDICINE | Facility: CLINIC | Age: 81
End: 2021-10-20

## 2021-10-20 DIAGNOSIS — Z79.01 LONG TERM (CURRENT) USE OF ANTICOAGULANTS: ICD-10-CM

## 2021-10-20 DIAGNOSIS — I48.20 CHRONIC ATRIAL FIBRILLATION (H): ICD-10-CM

## 2021-10-20 DIAGNOSIS — I48.21 PERMANENT ATRIAL FIBRILLATION (H): Primary | ICD-10-CM

## 2021-10-20 LAB — INR (EXTERNAL): 4.6 (ref 0.9–1.1)

## 2021-10-20 NOTE — TELEPHONE ENCOUNTER
Patient calls to request PT thru [facility] because she is so weak and has a hard time even getting up now.  Please advise.

## 2021-10-20 NOTE — PROGRESS NOTES
ANTICOAGULATION MANAGEMENT     Savanna Rehman 80 year old female is on warfarin with supratherapeutic INR result. (Goal INR 2.0-3.0)    Recent labs: (last 7 days)     10/20/21  1319   INR 4.6*       ASSESSMENT     Source(s): Chart Review and Home Care/Facility Nurse       Warfarin doses taken: Warfarin taken as instructed    Diet: Decreased greens/vitamin K in diet; plans to resume previous intake, patient is not feeling well, not eating well.    New illness, injury, or hospitalization: Yes: Diarrhea due to C.diff, once in the morning every day    Medication/supplement changes: yes, metronidazole antibiotic for 3 more days    Signs or symptoms of bleeding or clotting: No    Previous INR: Therapeutic last 2(+) visits    Additional findings: None     PLAN     Recommended plan for temporary change(s) affecting INR     Dosing Instructions: Hold 2 doses then continue your current warfarin dose with next INR in 1 week       Summary  As of 10/20/2021    Full warfarin instructions:  3.75 mg every Wed; 2.5 mg all other days   Next INR check:               Telephone call with home care nurse Roseline who agrees to plan and repeated back plan correctly    Orders given to  Homecare nurse/facility to recheck    Education provided: Potential interaction between warfarin and metronidazole, Monitoring for bleeding signs and symptoms and When to seek medical attention/emergency care    Plan made per [facility] anticoagulation protocol    Iris Kemp RN  Anticoagulation Clinic  10/20/2021    _______________________________________________________________________     Anticoagulation Episode Summary     Current INR goal:  2.0-3.0   TTR:  72.5 % (1 y)   Target end date:  Indefinite   Send INR reminders to:  NIKKI CHASE    Indications    Permanent atrial fibrillation (H) [I48.21]  Chronic atrial fibrillation (H) [I48.20]  Long term (current) use of anticoagulants [Z79.01]           Comments:  Home care          Anticoagulation Care  Providers     Provider Role Specialty Phone number    Patti Suárez MD Referring Internal Medicine [phone]

## 2021-10-20 NOTE — TELEPHONE ENCOUNTER
Patient asking for recent lab results.  Provider note that a result letter was sent relayed to patient.    Maci Dallas RN  [facility] Nurse Advisors    Additional Information    Health Information question, no triage required and triager able to answer question    Protocols used: INFORMATION ONLY CALL-A-AH

## 2021-10-22 ENCOUNTER — TELEPHONE (OUTPATIENT)
Dept: INTERNAL MEDICINE | Facility: CLINIC | Age: 81
End: 2021-10-22

## 2021-10-25 ENCOUNTER — TELEPHONE (OUTPATIENT)
Dept: NURSING | Facility: CLINIC | Age: 81
End: 2021-10-25

## 2021-10-25 ENCOUNTER — MEDICAL CORRESPONDENCE (OUTPATIENT)
Dept: HEALTH INFORMATION MANAGEMENT | Facility: CLINIC | Age: 81
End: 2021-10-25
Payer: COMMERCIAL

## 2021-10-25 ENCOUNTER — TELEPHONE (OUTPATIENT)
Dept: INTERNAL MEDICINE | Facility: CLINIC | Age: 81
End: 2021-10-25

## 2021-10-25 NOTE — TELEPHONE ENCOUNTER
Patient is taking Imodium and that is helping the Diarrhea for 15 1/2 days and it seems to be helping but she is not sure if it is the pills or not. Patient is asking what next step is?  Her stools are not solid and like what she called a mushy snowball.  Blood sugar is up and now her INR is off.  Please advise.    Patients legs are still weak and she aches all over and had a headache and was not able to come into the clinic last week.  Patient states she has fallen twice and had to have the fire Department had to come to pick her up.  Patient states she still couldn't make it into th clinic today. Her legs wiggle like Jello.  Can Homecare evaluate her at home for PT needs?    Patient could use assistance with bathing and also better transferring and standing up from chair and Commode.

## 2021-10-26 ENCOUNTER — ANTICOAGULATION THERAPY VISIT (OUTPATIENT)
Dept: ANTICOAGULATION | Facility: CLINIC | Age: 81
End: 2021-10-26

## 2021-10-26 ENCOUNTER — PATIENT OUTREACH (OUTPATIENT)
Dept: UROLOGY | Facility: CLINIC | Age: 81
End: 2021-10-26

## 2021-10-26 ENCOUNTER — TELEPHONE (OUTPATIENT)
Dept: INTERNAL MEDICINE | Facility: CLINIC | Age: 81
End: 2021-10-26
Payer: COMMERCIAL

## 2021-10-26 DIAGNOSIS — I48.20 CHRONIC ATRIAL FIBRILLATION (H): ICD-10-CM

## 2021-10-26 DIAGNOSIS — N39.0 RECURRENT UTI: Primary | ICD-10-CM

## 2021-10-26 DIAGNOSIS — Z79.01 LONG TERM (CURRENT) USE OF ANTICOAGULANTS: ICD-10-CM

## 2021-10-26 DIAGNOSIS — I48.21 PERMANENT ATRIAL FIBRILLATION (H): Primary | ICD-10-CM

## 2021-10-26 LAB — INR (EXTERNAL): 2.9 (ref 0.9–1.1)

## 2021-10-26 NOTE — TELEPHONE ENCOUNTER
Patient is calling in to see if there is a response from her PCP from her message today about what to do about her c diff and her request for PT because her legs are so weak and shaky .    She wants it known that she didn't refuse her appointment, she was to sick to get there.    Patient verbalized understanding and agrees with plan.    Lashay Garcia Nurse Triage Advisor 9:05 PM 10/25/2021

## 2021-10-26 NOTE — PROGRESS NOTES
"ANTICOAGULATION MANAGEMENT     Savanna Rehman 80 year old female is on warfarin with therapeutic INR result. (Goal INR 2.0-3.0)    Recent labs: (last 7 days)     10/26/21  1430   INR 2.9*       ASSESSMENT     Source(s): Chart Review and Home Care/Facility Nurse       Warfarin doses taken: Warfarin taken as instructed - Warfarin held x2 last week due to supratherapeutic INR    Diet: Continues to have poor appetite     New illness, injury, or hospitalization: Per patient chart, she called into PCP office to report that she has had two recent falls, weakness, legs feel like \"jello\". Continues to have diarrhea from C.diff, taking imodium which has helped reduce number of stools. PCP advised patient to be seen in UC or ED and patient has not.     Medication/supplement changes: Patient completed courses of Vancomycin & metronidazole on 10/24/21.     Signs or symptoms of bleeding or clotting: No    Previous INR: Supratherapeutic    Additional findings: None     PLAN     Recommended plan for temporary change(s) and ongoing change(s) affecting INR     Dosing Instructions:  Decrease your warfarin dose (6.7% change) with next INR in 3 days       Summary  As of 10/26/2021    Full warfarin instructions:  2.5 mg every day   Next INR check:  10/29/2021             Telephone call with home care nurse Roseline who verbalizes understanding and agrees to plan    Orders given to  Homecare nurse/facility to recheck    Education provided: Goal range and significance of current result, Importance of therapeutic range, Potential interaction between warfarin and episodes of diarrhea, poor appetite. and When to seek medical attention/emergency care    Plan made with [facility] Pharmacist Jayleen Martinez, RN  Anticoagulation Clinic  10/26/2021    _______________________________________________________________________     Anticoagulation Episode Summary     Current INR goal:  2.0-3.0   TTR:  72.6 % (1 y)   Target end date:  Indefinite "   Send INR reminders to:  [facility]    Indications    Permanent atrial fibrillation (H) [I48.21]  Chronic atrial fibrillation (H) [I48.20]  Long term (current) use of anticoagulants [Z79.01]           Comments:  Home care          Anticoagulation Care Providers     Provider Role Specialty Phone number    Patti Suárez MD Referring Internal Medicine [phone]

## 2021-10-26 NOTE — TELEPHONE ENCOUNTER
Please see her message  Can patient be worked in on providers schedule Friday  Please advise  thanks

## 2021-10-26 NOTE — TELEPHONE ENCOUNTER
Called patient  Hasn't been out of bed  Has diarrhea if trying to stop Immodium  Legs feeling like jello and weak  Offered ED as stated below    Requesting home care nurse to evaluate patients needs for transfer and bathing assistance.  She has an home care RN that comes in Wednesdays     Even Physical Therapy evaluate and to help with patient strengthening

## 2021-10-26 NOTE — PROGRESS NOTES
Chart reviewed with [facility] RN at time of encounter.    Agree with dosing.    Jayleen Oconnor, PharmD BCACP  Anticoagulation Clinical Pharmacist

## 2021-10-26 NOTE — TELEPHONE ENCOUNTER
Patient calling to say she still has diarrhea and will not be able to keep this appointment. This writer rescheduled her appointments. Patient has appointment at the lab for 12/2/21.    Adelaida Liu RN   Care Coordinator Urology

## 2021-10-26 NOTE — TELEPHONE ENCOUNTER
Patient calling. She has an INR this Friday morning and wants to know if she can be worked in to be seen by Dr Lauren. Ok to call and lm [phone]  Patient stated she can not afford a ambulance to take her to the ED for C Diff

## 2021-10-28 ENCOUNTER — TELEPHONE (OUTPATIENT)
Dept: INTERNAL MEDICINE | Facility: CLINIC | Age: 81
End: 2021-10-28

## 2021-10-28 ENCOUNTER — MEDICAL CORRESPONDENCE (OUTPATIENT)
Dept: HEALTH INFORMATION MANAGEMENT | Facility: CLINIC | Age: 81
End: 2021-10-28
Payer: COMMERCIAL

## 2021-10-28 DIAGNOSIS — R53.1 WEAKNESS: ICD-10-CM

## 2021-10-28 DIAGNOSIS — K52.9 CHRONIC DIARRHEA: Primary | ICD-10-CM

## 2021-10-28 NOTE — TELEPHONE ENCOUNTER
Patient called with appointment information  She verbalized understanding  Told to expect labs and assessement

## 2021-10-28 NOTE — TELEPHONE ENCOUNTER
Referral pended.  This writer had signed it thinking ok per verbal from other RN provider spoke with after computer shut down.  If provider could please sign  thanks

## 2021-10-28 NOTE — TELEPHONE ENCOUNTER
Patient scheduled @ acute diagnostic center on Nov 29 @ 10:00  CLIFFORD De La Rosa communicated with the Pt and [facility]    Dr Lauren Discussed  w [facility] earlier

## 2021-10-29 ENCOUNTER — ANTICOAGULATION THERAPY VISIT (OUTPATIENT)
Dept: INTERNAL MEDICINE | Facility: CLINIC | Age: 81
End: 2021-10-29

## 2021-10-29 ENCOUNTER — OFFICE VISIT (OUTPATIENT)
Dept: PEDIATRICS | Facility: CLINIC | Age: 81
End: 2021-10-29
Payer: COMMERCIAL

## 2021-10-29 VITALS
WEIGHT: 262 LBS | BODY MASS INDEX: 39.84 KG/M2 | RESPIRATION RATE: 18 BRPM | SYSTOLIC BLOOD PRESSURE: 147 MMHG | HEART RATE: 88 BPM | TEMPERATURE: 98.2 F | OXYGEN SATURATION: 94 % | DIASTOLIC BLOOD PRESSURE: 80 MMHG

## 2021-10-29 DIAGNOSIS — R19.7 DIARRHEA, UNSPECIFIED TYPE: ICD-10-CM

## 2021-10-29 DIAGNOSIS — I48.21 PERMANENT ATRIAL FIBRILLATION (H): Primary | ICD-10-CM

## 2021-10-29 DIAGNOSIS — I48.20 CHRONIC ATRIAL FIBRILLATION (H): ICD-10-CM

## 2021-10-29 DIAGNOSIS — E86.0 DEHYDRATION: Primary | ICD-10-CM

## 2021-10-29 DIAGNOSIS — Z79.01 LONG TERM (CURRENT) USE OF ANTICOAGULANTS: ICD-10-CM

## 2021-10-29 DIAGNOSIS — Z86.19 HX OF CLOSTRIDIUM DIFFICILE INFECTION: ICD-10-CM

## 2021-10-29 DIAGNOSIS — R35.0 URINARY FREQUENCY: ICD-10-CM

## 2021-10-29 LAB
ALBUMIN SERPL-MCNC: 3.2 G/DL (ref 3.4–5)
ALBUMIN UR-MCNC: 10 MG/DL
ALP SERPL-CCNC: 58 U/L (ref 40–150)
ALT SERPL W P-5'-P-CCNC: 23 U/L (ref 0–50)
ANION GAP SERPL CALCULATED.3IONS-SCNC: 5 MMOL/L (ref 3–14)
APPEARANCE UR: ABNORMAL
AST SERPL W P-5'-P-CCNC: 33 U/L (ref 0–45)
BACTERIA #/AREA URNS HPF: ABNORMAL /HPF
BASOPHILS # BLD AUTO: 0.1 10E3/UL (ref 0–0.2)
BASOPHILS NFR BLD AUTO: 1 %
BILIRUB SERPL-MCNC: 0.7 MG/DL (ref 0.2–1.3)
BILIRUB UR QL STRIP: NEGATIVE
BUN SERPL-MCNC: 8 MG/DL (ref 7–30)
CALCIUM SERPL-MCNC: 8.7 MG/DL (ref 8.5–10.1)
CHLORIDE BLD-SCNC: 105 MMOL/L (ref 94–109)
CO2 SERPL-SCNC: 27 MMOL/L (ref 20–32)
COLOR UR AUTO: YELLOW
CREAT SERPL-MCNC: 0.87 MG/DL (ref 0.52–1.04)
EOSINOPHIL # BLD AUTO: 0.2 10E3/UL (ref 0–0.7)
EOSINOPHIL NFR BLD AUTO: 4 %
ERYTHROCYTE [DISTWIDTH] IN BLOOD BY AUTOMATED COUNT: 13.9 % (ref 10–15)
GFR SERPL CREATININE-BSD FRML MDRD: 64 ML/MIN/1.73M2
GLUCOSE BLD-MCNC: 138 MG/DL (ref 70–99)
GLUCOSE UR STRIP-MCNC: NEGATIVE MG/DL
HCT VFR BLD AUTO: 42.2 % (ref 35–47)
HGB BLD-MCNC: 14.2 G/DL (ref 11.7–15.7)
HGB UR QL STRIP: NEGATIVE
IMM GRANULOCYTES # BLD: 0 10E3/UL
IMM GRANULOCYTES NFR BLD: 0 %
INR PPP: 3.25 (ref 0.85–1.15)
KETONES UR STRIP-MCNC: NEGATIVE MG/DL
LEUKOCYTE ESTERASE UR QL STRIP: ABNORMAL
LYMPHOCYTES # BLD AUTO: 1.6 10E3/UL (ref 0.8–5.3)
LYMPHOCYTES NFR BLD AUTO: 32 %
MCH RBC QN AUTO: 31.2 PG (ref 26.5–33)
MCHC RBC AUTO-ENTMCNC: 33.6 G/DL (ref 31.5–36.5)
MCV RBC AUTO: 93 FL (ref 78–100)
MONOCYTES # BLD AUTO: 0.7 10E3/UL (ref 0–1.3)
MONOCYTES NFR BLD AUTO: 15 %
MUCOUS THREADS #/AREA URNS LPF: PRESENT /LPF
NEUTROPHILS # BLD AUTO: 2.4 10E3/UL (ref 1.6–8.3)
NEUTROPHILS NFR BLD AUTO: 48 %
NITRATE UR QL: POSITIVE
NRBC # BLD AUTO: 0 10E3/UL
NRBC BLD AUTO-RTO: 0 /100
PH UR STRIP: 5 [PH] (ref 5–7)
PLATELET # BLD AUTO: 187 10E3/UL (ref 150–450)
POTASSIUM BLD-SCNC: 3.8 MMOL/L (ref 3.4–5.3)
PROT SERPL-MCNC: 7.5 G/DL (ref 6.8–8.8)
RBC # BLD AUTO: 4.55 10E6/UL (ref 3.8–5.2)
RBC URINE: 4 /HPF
SODIUM SERPL-SCNC: 137 MMOL/L (ref 133–144)
SP GR UR STRIP: 1.02 (ref 1–1.03)
SQUAMOUS EPITHELIAL: 1 /HPF
UROBILINOGEN UR STRIP-MCNC: NORMAL MG/DL
WBC # BLD AUTO: 5.1 10E3/UL (ref 4–11)
WBC URINE: 56 /HPF

## 2021-10-29 PROCEDURE — 36415 COLL VENOUS BLD VENIPUNCTURE: CPT | Performed by: PHYSICIAN ASSISTANT

## 2021-10-29 PROCEDURE — 87186 SC STD MICRODIL/AGAR DIL: CPT | Performed by: PHYSICIAN ASSISTANT

## 2021-10-29 PROCEDURE — 85610 PROTHROMBIN TIME: CPT | Performed by: PHYSICIAN ASSISTANT

## 2021-10-29 PROCEDURE — 99417 PROLNG OP E/M EACH 15 MIN: CPT | Performed by: PHYSICIAN ASSISTANT

## 2021-10-29 PROCEDURE — 85025 COMPLETE CBC W/AUTO DIFF WBC: CPT | Performed by: PHYSICIAN ASSISTANT

## 2021-10-29 PROCEDURE — 87086 URINE CULTURE/COLONY COUNT: CPT | Performed by: PHYSICIAN ASSISTANT

## 2021-10-29 PROCEDURE — 99215 OFFICE O/P EST HI 40 MIN: CPT | Performed by: PHYSICIAN ASSISTANT

## 2021-10-29 PROCEDURE — 80053 COMPREHEN METABOLIC PANEL: CPT | Performed by: PHYSICIAN ASSISTANT

## 2021-10-29 PROCEDURE — 81001 URINALYSIS AUTO W/SCOPE: CPT | Performed by: PHYSICIAN ASSISTANT

## 2021-10-29 NOTE — PATIENT INSTRUCTIONS
Patient Education     Clostridioides Difficile Toxin (Stool)  Does this test have other names?  C. diff, C. difficile  What is this test?  This is a test to look at your bowel movement (stool) for harmful substances called toxins produced by Clostridioides (formerly Clostridium) difficile bacteria.   Your gastrointestinal (GI) tract is home to many healthy bacteria, and sometimes C. difficile is one of them. But in some cases, taking broad-spectrum antibiotics can upset the balance of healthy bacteria in your gut and cause new or antibiotic-resistant strains of C. difficile to become overgrown. These germs can then release toxins into your GI tract, inflaming the colon and causing continuing diarrhea. The resulting illness can be serious, even life-threatening, especially if you have a weak immune system.   Even if they are not taking antibiotics, people with a weak immune system often have an overabundance of C. difficile bacteria.   Why do I need this test?  You may need this test if you have any of these symptoms, especially if you are in the hospital or were recently taking antibiotics:     Diarrhea. This means 3 or more loose stools per day for at least 2 days.    Blood or mucus in your stool    Stomach pain, nausea, loss of appetite, or fever, particularly if you have persistent diarrhea  What other tests might I have along with this test?  Your healthcare provider might order other stool tests to look for C. difficile infection, such as a glutamate dehydrogenase (GDH) test and a stool culture test.   If your stool tests are negative, but your healthcare provider still strongly suspects C. difficile infection, you may have a sigmoidoscopy or colonoscopy to help make a diagnosis. In this procedure, a healthcare provider examines your colon with a very thin, flexible lighted tube that is equipped with a tiny video camera on the end.   This infection is a major cause of illness and death among older adults who  are in the hospital, so healthcare providers are likely to test people in this group who develop persistent diarrhea after taking antibiotics.   What do my test results mean?  Test results may vary depending on your age, gender, health history, the method used for the test, and other things. Your test results may not mean you have a problem. Ask your healthcare provider what your test results mean for you.    A normal result for this stool test is negative, which means you had no C. difficile toxins in your sample. But this test result is not accurate all of the time. A small portion of people might have the infection even if the result is negative. If your healthcare provider still suspects infection, he or she may do other tests.   If your stool tests positive for C. difficile toxins, your healthcare provider may decide that you have antibiotic-associated colitis, or inflammation of the colon.   How is this test done?  This test needs a stool sample. Your healthcare provider will instruct you how to collect a sample into a disposable specimen container with a lid. Do not collect stool from the toilet bowl or put toilet paper into the specimen container.   Does this test pose any risks?  This test poses no known risks.  What might affect my test results?  Contaminating the stool sample with toilet water, urine, or other substances can make it unfit for testing or affect the results.   How do I get ready for this test?  You don't need to prepare for this test. Be sure your healthcare provider knows about all medicines, herbs, vitamins, and supplements you are taking. This includes medicines that don't need a prescription and any illicit drugs you may use.    "[employer] last reviewed this educational content on 2/1/2021 2000-2021 [employer]. All rights reserved. This information is not intended as a substitute for professional medical care. Always follow your healthcare professional's  instructions.           Patient Education     Treating Diarrhea  Diarrhea happens when you have loose, watery, or frequent bowel movements. It is a common problem with many causes. Most cases of diarrhea clear up on their own. But certain cases may need treatment. Be sure to see your healthcare provider if your symptoms don't get better in a few days.   Getting relief  Treatment of diarrhea depends on its cause. Diarrhea caused by bacterial or parasite infection is often treated with antibiotics. Diarrhea caused by other factors, such as a stomach virus, often improves with simple home treatment. The tips below may also help ease your symptoms.       Drink plenty of fluids. This helps prevent too much fluid loss (dehydration). Water, clear soups, and electrolyte solutions are good choices. Don't take alcohol, coffee, tea, or milk. These can irritate your intestines and make symptoms worse.    Suck on ice chips if drinking makes you queasy.    Return to your normal diet slowly. You may want to eat bland foods at first, such as rice and toast. Also, you may need to stay away from certain foods for a while, such as dairy products. These can make symptoms worse. Ask your healthcare provider if there are any other foods you should stay away from.    If you were prescribed antibiotics, take them as directed.    Don't take anti-diarrhea medicines without asking your provider first.  Call your healthcare provider   Call your healthcare provider if you have any of the following:      A fever of 100.4  F ( 38.0 C) or higher, or as directed by your provider    Chills    Severe pain    Worsening diarrhea or diarrhea for more than 2 days    Bloody vomit or stool    Signs of dehydration (dizziness, dry mouth and tongue, rapid pulse, dark urine)  [employer] last reviewed this educational content on 6/1/2019 2000-2021 [employer]. All rights reserved. This information is not intended as a substitute for professional  medical care. Always follow your healthcare professional's instructions.

## 2021-10-29 NOTE — Clinical Note
Thank you for your referral to the [facility],  Patient was found to be mildly dehydrated but also a difficult stick.  Shes wanting to defer the IV fluids and work on Oral fluid hydration.  She has chronic diarrhea and takes immodium.  She has not taken vancomycin. Patients diarrhea seems to be back to her baseline.  She was referred to ID for follow up D. Diff Colitis.  Thank you  Zoltan Perez, [facility]

## 2021-10-29 NOTE — PROGRESS NOTES
ANTICOAGULATION MANAGEMENT     Savanna Rehman 80 year old female is on warfarin with supratherapeutic INR result. (Goal INR 2.0-3.0)    Recent labs: (last 7 days)     10/29/21  1032   INR 3.25*       ASSESSMENT     Source(s): Chart Review and Patient/Caregiver Call       Warfarin doses taken: Warfarin taken as instructed    Diet: No new diet changes identified    New illness, injury, or hospitalization: No    Medication/supplement changes: None noted    Signs or symptoms of bleeding or clotting: No    Previous INR: Therapeutic last visit; previously outside of goal range    Additional findings: patient reports being more stressed then normal      PLAN     Recommended plan for temporary change(s) affecting INR     Dosing Instructions: Hold dose then Decrease your warfarin dose (7% change) with next INR in 5 days       Summary  As of 10/29/2021    Full warfarin instructions:  2.5 mg every day   Next INR check:               Telephone call with Savanna who verbalizes understanding and agrees to plan    Patient believes home care nurse  Roseline is coming to patients house on 11/3    Education provided: Please call back if any changes to your diet, medications or how you've been taking warfarin    Plan made per [facility] anticoagulation protocol    Sanjana Fox RN  Anticoagulation Clinic  10/29/2021    _______________________________________________________________________     Anticoagulation Episode Summary     Current INR goal:  2.0-3.0   TTR:  72.9 % (1 y)   Target end date:  Indefinite   Send INR reminders to:  [facility]    Indications    Permanent atrial fibrillation (H) [I48.21]  Chronic atrial fibrillation (H) [I48.20]  Long term (current) use of anticoagulants [Z79.01]           Comments:  Home care          Anticoagulation Care Providers     Provider Role Specialty Phone number    Patti Suárez MD Referring Internal Medicine [phone]          Sanjana Green RN, BSN, PHN  Anticoagulation  Nurse  [phone]

## 2021-10-29 NOTE — PROGRESS NOTES
"  Assessment & Plan     Dehydration  CMP shows normal renal and hepatic function  IV fluids were unable to be established    - Comprehensive metabolic panel (BMP + Alb, Alk Phos, ALT, AST, Total. Bili, TP); Future  - 0.9% sodium chloride BOLUS  - sodium chloride (PF) 0.9% PF flush 3 mL  - Comprehensive metabolic panel (BMP + Alb, Alk Phos, ALT, AST, Total. Bili, TP)    Diarrhea, unspecified type  CBC negative for elevated WBC or anemia  UA appears contaminated  Urine culture pending  Patient has had a positive C Diff culture and treated with flagyl.   Patient has chronic diarrhea and takes immodium  Referral to ID for follow up C Diff colitis management  - CBC with platelets and differential; Future  - Comprehensive metabolic panel (BMP + Alb, Alk Phos, ALT, AST, Total. Bili, TP); Future  - CBC with platelets and differential  - Comprehensive metabolic panel (BMP + Alb, Alk Phos, ALT, AST, Total. Bili, TP)  - UA with Microscopic reflex to Culture  - Urine Culture  - Infectious Disease Referral; Future    Hx of Clostridium difficile infection  Referral to ID for C. Diff  - Infectious Disease Referral; Future    Long term (current) use of anticoagulants  INR is 3.25, INR clinic has been contacted about results  - INR; Future  - INR    Urinary frequency  UA appears contaminated  Urine culture pending      Review of external notes as documented elsewhere in note  90 minutes spent on the date of the encounter doing chart review, review of outside records, review of test results, interpretation of tests, patient visit, documentation and discussion with other provider(s)        BMI:   Estimated body mass index is 39.84 kg/m  as calculated from the following:    Height as of 8/12/21: 1.727 m (5' 8\").    Weight as of this encounter: 118.8 kg (262 lb).   CONSULTATION/REFERRAL to ID    No follow-ups on file.    Zoltan Perez PA-C  [facility] is [facility] 80 year [facility] who presents for " "the following health issues     HPI     Concern - Fatigue and Weakness  Onset: X 2-3 weeks  Description: Very weak, fatigued.  Recently treated for C. Diff.  Denies any diarrhea or lose stools  Intensity: severe  Progression of Symptoms:  improving  Accompanying Signs & Symptoms: dizziness   Previous history of similar problem: none  Precipitating factors:        Worsened by: moving her head, walking  Alleviating factors:        Improved by: none  Therapies tried and outcome: \"took at dizzy pill this morning\" Legs feel like \"jelly\"        Review of Systems   Constitutional, HEENT, cardiovascular, pulmonary, gi and gu systems are negative, except as otherwise noted.      Objective    LMP  (LMP Unknown)   There is no height or weight on file to calculate BMI.  Physical Exam   GENERAL: healthy, alert and no distress  NECK: no adenopathy, no asymmetry, masses, or scars and thyroid normal to palpation  RESP: lungs clear to auscultation - no rales, rhonchi or wheezes  CV: regular rate and rhythm, normal S1 S2, no S3 or S4, no murmur, click or rub, no peripheral edema and peripheral pulses strong  ABDOMEN: soft, nontender, no hepatosplenomegaly, no masses and bowel sounds normal  MS: no gross musculoskeletal defects noted, no edema  SKIN: no suspicious lesions or rashes  NEURO: Normal strength and tone, mentation intact and speech normal      Results for orders placed or performed in visit on 10/29/21   Comprehensive metabolic panel (BMP + Alb, Alk Phos, ALT, AST, Total. Bili, TP)     Status: Abnormal   Result Value Ref Range    Sodium 137 133 - 144 mmol/L    Potassium 3.8 3.4 - 5.3 mmol/L    Chloride 105 94 - 109 mmol/L    Carbon Dioxide (CO2) 27 20 - 32 mmol/L    Anion Gap 5 3 - 14 mmol/L    Urea Nitrogen 8 7 - 30 mg/dL    Creatinine 0.87 0.52 - 1.04 mg/dL    Calcium 8.7 8.5 - 10.1 mg/dL    Glucose 138 (H) 70 - 99 mg/dL    Alkaline Phosphatase 58 40 - 150 U/L    AST 33 0 - 45 U/L    ALT 23 0 - 50 U/L    Protein Total 7.5 " 6.8 - 8.8 g/dL    Albumin 3.2 (L) 3.4 - 5.0 g/dL    Bilirubin Total 0.7 0.2 - 1.3 mg/dL    GFR Estimate 64 >60 mL/min/1.73m2   INR     Status: Abnormal   Result Value Ref Range    INR 3.25 (H) 0.85 - 1.15   CBC with platelets and differential     Status: None   Result Value Ref Range    WBC Count 5.1 4.0 - 11.0 10e3/uL    RBC Count 4.55 3.80 - 5.20 10e6/uL    Hemoglobin 14.2 11.7 - 15.7 g/dL    Hematocrit 42.2 35.0 - 47.0 %    MCV 93 78 - 100 fL    MCH 31.2 26.5 - 33.0 pg    MCHC 33.6 31.5 - 36.5 g/dL    RDW 13.9 10.0 - 15.0 %    Platelet Count 187 150 - 450 10e3/uL    % Neutrophils 48 %    % Lymphocytes 32 %    % Monocytes 15 %    % Eosinophils 4 %    % Basophils 1 %    % Immature Granulocytes 0 %    NRBCs per 100 WBC 0 <1 /100    Absolute Neutrophils 2.4 1.6 - 8.3 10e3/uL    Absolute Lymphocytes 1.6 0.8 - 5.3 10e3/uL    Absolute Monocytes 0.7 0.0 - 1.3 10e3/uL    Absolute Eosinophils 0.2 0.0 - 0.7 10e3/uL    Absolute Basophils 0.1 0.0 - 0.2 10e3/uL    Absolute Immature Granulocytes 0.0 <=0.0 10e3/uL    Absolute NRBCs 0.0 10e3/uL   UA with Microscopic reflex to Culture     Status: Abnormal    Specimen: Urine, Midstream   Result Value Ref Range    Color Urine Yellow Colorless, Straw, Light Yellow, Yellow    Appearance Urine Slightly Cloudy (A) Clear    Glucose Urine Negative Negative mg/dL    Bilirubin Urine Negative Negative    Ketones Urine Negative Negative mg/dL    Specific Gravity Urine 1.018 1.003 - 1.035    Blood Urine Negative Negative    pH Urine 5.0 5.0 - 7.0    Protein Albumin Urine 10  (A) Negative mg/dL    Urobilinogen Urine Normal Normal, 2.0 mg/dL    Nitrite Urine Positive (A) Negative    Leukocyte Esterase Urine Large (A) Negative    Bacteria Urine Many (A) None Seen /HPF    Mucus Urine Present (A) None Seen /LPF    RBC Urine 4 (H) <=2 /HPF    WBC Urine 56 (H) <=5 /HPF    Squamous Epithelials Urine 1 <=1 /HPF    Narrative    Urine Culture ordered based on laboratory criteria   CBC with platelets and  differential     Status: None    Narrative    The following orders were created for panel order CBC with platelets and differential.  Procedure                               Abnormality         Status                     ---------                               -----------         ------                     CBC with platelets and d...[[id number]]                      Final result                 Please view results for these tests on the individual orders.

## 2021-10-31 DIAGNOSIS — H81.10 BENIGN PAROXYSMAL POSITIONAL VERTIGO, UNSPECIFIED LATERALITY: ICD-10-CM

## 2021-10-31 LAB
BACTERIA UR CULT: ABNORMAL
BACTERIA UR CULT: ABNORMAL

## 2021-11-01 ENCOUNTER — TELEPHONE (OUTPATIENT)
Dept: URGENT CARE | Facility: URGENT CARE | Age: 81
End: 2021-11-01

## 2021-11-01 ENCOUNTER — MEDICAL CORRESPONDENCE (OUTPATIENT)
Dept: HEALTH INFORMATION MANAGEMENT | Facility: CLINIC | Age: 81
End: 2021-11-01
Payer: COMMERCIAL

## 2021-11-01 ENCOUNTER — TELEPHONE (OUTPATIENT)
Dept: NURSING | Facility: CLINIC | Age: 81
End: 2021-11-01

## 2021-11-01 DIAGNOSIS — H81.10 BENIGN PAROXYSMAL POSITIONAL VERTIGO, UNSPECIFIED LATERALITY: ICD-10-CM

## 2021-11-01 DIAGNOSIS — N39.0 URINARY TRACT INFECTION WITHOUT HEMATURIA, SITE UNSPECIFIED: Primary | ICD-10-CM

## 2021-11-01 RX ORDER — CEPHALEXIN 500 MG/1
500 CAPSULE ORAL 3 TIMES DAILY
Qty: 21 CAPSULE | Refills: 0 | Status: SHIPPED | OUTPATIENT
Start: 2021-11-01 | End: 2021-11-08

## 2021-11-01 RX ORDER — MECLIZINE HCL 12.5 MG 12.5 MG/1
TABLET ORAL
Qty: 30 TABLET | Refills: 0 | Status: SHIPPED | OUTPATIENT
Start: 2021-11-01 | End: 2021-11-23

## 2021-11-02 NOTE — TELEPHONE ENCOUNTER
Patient calling for refill of medication she is taking for dizziness. Patient denies worsening symptoms.   Chart review shows prescription was sent in today by PCP.   Michelle Andino RN   11/01/21 10:38 PM  [facility] Nurse Advisor

## 2021-11-03 ENCOUNTER — ANTICOAGULATION THERAPY VISIT (OUTPATIENT)
Dept: INTERNAL MEDICINE | Facility: CLINIC | Age: 81
End: 2021-11-03

## 2021-11-03 ENCOUNTER — TELEPHONE (OUTPATIENT)
Dept: INTERNAL MEDICINE | Facility: CLINIC | Age: 81
End: 2021-11-03

## 2021-11-03 DIAGNOSIS — I48.21 PERMANENT ATRIAL FIBRILLATION (H): Primary | ICD-10-CM

## 2021-11-03 DIAGNOSIS — Z79.01 LONG TERM (CURRENT) USE OF ANTICOAGULANTS: ICD-10-CM

## 2021-11-03 DIAGNOSIS — I48.20 CHRONIC ATRIAL FIBRILLATION (H): ICD-10-CM

## 2021-11-03 DIAGNOSIS — R53.81 PHYSICAL DECONDITIONING: Primary | ICD-10-CM

## 2021-11-03 LAB — INR (EXTERNAL): 3.4 (ref 2–3)

## 2021-11-03 NOTE — TELEPHONE ENCOUNTER
Patient calling and needs a referral to [facility] for PT and their fax is [phone]  Ok to call and lm [phone]

## 2021-11-03 NOTE — PROGRESS NOTES
ANTICOAGULATION MANAGEMENT     Savanna Rehman 80 year old female is on warfarin with supratherapeutic INR result. (Goal INR 2.0-3.0)    Recent labs: (last 7 days)     11/03/21  1054   INR 3.4*       ASSESSMENT     Source(s): Chart Review and Home Care/Facility Nurse       Warfarin doses taken: Warfarin taken as instructed    Diet: No new diet changes identified    New illness, injury, or hospitalization: Yes: UTI    Medication/supplement changes: Started Keflex 11/2-11/9    Signs or symptoms of bleeding or clotting: No    Previous INR: Supratherapeutic    Additional findings: Vitamin K Antagonists (eg, warfarin): Cephalosporins may enhance the anticoagulant effect of Vitamin K Antagonists. Risk C: Monitor therapy     PLAN     Recommended plan for temporary change(s) and ongoing change(s) affecting INR     Dosing Instructions: Hold dose tonight then take 1.25mg tomorrow with next INR in 2 days       Summary  As of 11/3/2021    Full warfarin instructions:  1.25 mg every Fri; 2.5 mg all other days   Next INR check:               Telephone call with home care nurse Roseline who verbalizes understanding and agrees to plan    Orders given to  Homecare nurse/facility to recheck    Education provided: Potential interaction between warfarin and Keflex  and Contact [phone]  with any changes, questions or concerns.     Plan made per [facility] anticoagulation protocol    Sanjana Fox RN  Anticoagulation Clinic  11/3/2021    _______________________________________________________________________     Anticoagulation Episode Summary     Current INR goal:  2.0-3.0   TTR:  72.1 % (1 y)   Target end date:  Indefinite   Send INR reminders to:  [facility]    Indications    Permanent atrial fibrillation (H) [I48.21]  Chronic atrial fibrillation (H) [I48.20]  Long term (current) use of anticoagulants [Z79.01]           Comments:  Home care          Anticoagulation Care Providers     Provider Role Specialty Phone number     Patti Suárez MD Referring Internal Medicine [phone]          Sanjana Green RN, BSN, PHN  [facility] Nurse  [phone]

## 2021-11-04 ENCOUNTER — TELEPHONE (OUTPATIENT)
Dept: INTERNAL MEDICINE | Facility: CLINIC | Age: 81
End: 2021-11-04

## 2021-11-04 NOTE — TELEPHONE ENCOUNTER
[facility] report of INR 3.4 on 11/04/21 received via fax, form to your in box for signature

## 2021-11-04 NOTE — TELEPHONE ENCOUNTER
Patient states her legs are like jello and she has not discussed this with the provider. Advised patient to schedule appointment.

## 2021-11-05 ENCOUNTER — MEDICAL CORRESPONDENCE (OUTPATIENT)
Dept: HEALTH INFORMATION MANAGEMENT | Facility: CLINIC | Age: 81
End: 2021-11-05
Payer: COMMERCIAL

## 2021-11-05 ENCOUNTER — ANTICOAGULATION THERAPY VISIT (OUTPATIENT)
Dept: ANTICOAGULATION | Facility: CLINIC | Age: 81
End: 2021-11-05

## 2021-11-05 ENCOUNTER — TELEPHONE (OUTPATIENT)
Dept: INTERNAL MEDICINE | Facility: CLINIC | Age: 81
End: 2021-11-05
Payer: COMMERCIAL

## 2021-11-05 ENCOUNTER — NURSE TRIAGE (OUTPATIENT)
Dept: NURSING | Facility: CLINIC | Age: 81
End: 2021-11-05

## 2021-11-05 DIAGNOSIS — I48.21 PERMANENT ATRIAL FIBRILLATION (H): Primary | ICD-10-CM

## 2021-11-05 DIAGNOSIS — I48.20 CHRONIC ATRIAL FIBRILLATION (H): ICD-10-CM

## 2021-11-05 DIAGNOSIS — Z79.01 LONG TERM (CURRENT) USE OF ANTICOAGULANTS: ICD-10-CM

## 2021-11-05 LAB — INR (EXTERNAL): 3.3 (ref 0.9–1.1)

## 2021-11-05 NOTE — TELEPHONE ENCOUNTER
Please check with [facility] and PT if they see patients for deconditioning.  If yes, please write the referral and I will sign

## 2021-11-05 NOTE — TELEPHONE ENCOUNTER
Patient calls ans the number given previously for [facility] is wrong.  Please fax as soon as possible to [phone].

## 2021-11-05 NOTE — PROGRESS NOTES
ANTICOAGULATION MANAGEMENT     Savanna Rehman 80 year old female is on warfarin with supratherapeutic INR result. (Goal INR 2.0-3.0)    Recent labs: (last 7 days)     11/05/21  1156   INR 3.3*       ASSESSMENT     Source(s): Home Care/Facility Nurse       Warfarin doses taken: Warfarin taken as instructed    Diet: No new diet changes identified    New illness, injury, or hospitalization: Yes: Currently being treated for UTI    Medication/supplement changes: Keflex 11/2-11/8 which can increase INR and risk of bleeding.    Signs or symptoms of bleeding or clotting: No    Previous INR: Supratherapeutic    Additional findings: None     PLAN     Recommended plan for temporary change(s) affecting INR     Dosing Instructions: Hold 2 doses then continue your current warfarin dose with next INR in 3 days       Summary  As of 11/5/2021    Full warfarin instructions:  11/5: Hold; 11/6: Hold; Otherwise 1.25 mg every Fri; 2.5 mg all other days   Next INR check:  11/8/2021             Telephone call with home care nurse Lana who verbalizes understanding and agrees to plan    Orders given to  Homecare nurse/facility to recheck    Education provided: Goal range and significance of current result    Plan made with [facility] Pharmacist Beba West RN  Anticoagulation Clinic  11/5/2021    _______________________________________________________________________     Anticoagulation Episode Summary     Current INR goal:  2.0-3.0   TTR:  71.5 % (1 y)   Target end date:  Indefinite   Send INR reminders to:  [facility]    Indications    Permanent atrial fibrillation (H) [I48.21]  Chronic atrial fibrillation (H) [I48.20]  Long term (current) use of anticoagulants [Z79.01]           Comments:  Home care          Anticoagulation Care Providers     Provider Role Specialty Phone number    Patti Suárez MD Referring Internal Medicine [phone]

## 2021-11-05 NOTE — TELEPHONE ENCOUNTER
Patient is requesting [facility]. Should we route to referrals? Or enter order for Telephone for [facility] for physical therapy and they will determine if this is covered?

## 2021-11-06 NOTE — TELEPHONE ENCOUNTER
Pt called wanting to know if her physical therapy refreal from was signed today and faxed.     Pt was informed the forms was signed and faxed per chart note. Pt stated okay.       Chai Yanes RN  [facility] Nurse Advisors       Additional Information    Health Information question, no triage required and triager able to answer question    Protocols used: INFORMATION ONLY CALL-A-AH

## 2021-11-08 ENCOUNTER — ANTICOAGULATION THERAPY VISIT (OUTPATIENT)
Dept: INTERNAL MEDICINE | Facility: CLINIC | Age: 81
End: 2021-11-08
Payer: COMMERCIAL

## 2021-11-08 ENCOUNTER — MEDICAL CORRESPONDENCE (OUTPATIENT)
Dept: HEALTH INFORMATION MANAGEMENT | Facility: CLINIC | Age: 81
End: 2021-11-08
Payer: COMMERCIAL

## 2021-11-08 DIAGNOSIS — Z79.01 LONG TERM (CURRENT) USE OF ANTICOAGULANTS: ICD-10-CM

## 2021-11-08 DIAGNOSIS — I48.20 CHRONIC ATRIAL FIBRILLATION (H): ICD-10-CM

## 2021-11-08 DIAGNOSIS — I48.21 PERMANENT ATRIAL FIBRILLATION (H): Primary | ICD-10-CM

## 2021-11-08 LAB — INR (EXTERNAL): 1.7 (ref 0.9–1.1)

## 2021-11-08 NOTE — TELEPHONE ENCOUNTER
Patient calls very upset that referral has not been sent to [facility].  Please fax asap to [phone].

## 2021-11-08 NOTE — TELEPHONE ENCOUNTER
"Spoke with Pt whose insurance is [employer] and this is \"Open Access\" which allows Pt to be seen at [facility] in [place].    NOLBERTO Alva [facility] Referral Rep  "

## 2021-11-08 NOTE — PROGRESS NOTES
ANTICOAGULATION MANAGEMENT     Savanna Rehman 80 year old female is on warfarin with subtherapeutic INR result. (Goal INR 2.0-3.0)    Recent labs: (last 7 days)     11/08/21  1334   INR 1.7*       ASSESSMENT     Source(s): Chart Review and Home Care/Facility Nurse       Warfarin doses taken: Warfarin taken as instructed    Diet: No new diet changes identified    New illness, injury, or hospitalization: No    Medication/supplement changes: Completed keflex today    Signs or symptoms of bleeding or clotting: No    Previous INR: Supratherapeutic    Additional findings: None     PLAN     Recommended plan for temporary change(s) affecting INR     Dosing Instructions: Continue your current warfarin dose with next INR in 10 days with next home care visit         Summary  As of 11/8/2021    Full warfarin instructions:  1.25 mg every Fri; 2.5 mg all other days   Next INR check:               Telephone call with home care nurse Roseline who verbalizes understanding and agrees to plan    Orders given to  Homecare nurse/facility to recheck    Education provided: None required and Please call back if any changes to your diet, medications or how you've been taking warfarin    Plan made per [facility] anticoagulation protocol    Iris Kemp RN  Anticoagulation Clinic  11/8/2021    _______________________________________________________________________     Anticoagulation Episode Summary     Current INR goal:  2.0-3.0   TTR:  71.2 % (1 y)   Target end date:  Indefinite   Send INR reminders to:  [facility]    Indications    Permanent atrial fibrillation (H) [I48.21]  Chronic atrial fibrillation (H) [I48.20]  Long term (current) use of anticoagulants [Z79.01]           Comments:  Home care          Anticoagulation Care Providers     Provider Role Specialty Phone number    Patti Suárez MD Referring Internal Medicine [phone]

## 2021-11-08 NOTE — TELEPHONE ENCOUNTER
Please see my message from 4 days ago  If the referral department agrees, we can send the patient to [facility]

## 2021-11-09 NOTE — TELEPHONE ENCOUNTER
Referral faxed.    Angela Pham CMA  [facility]in[facility] [place]  [phone]

## 2021-11-10 ENCOUNTER — NURSE TRIAGE (OUTPATIENT)
Dept: NURSING | Facility: CLINIC | Age: 81
End: 2021-11-10
Payer: COMMERCIAL

## 2021-11-11 NOTE — TELEPHONE ENCOUNTER
"TRIAGE CALL     Patient calling -  Reports having diarrhea - she has been at [facility] and she has been having multiples test due to her \"side Bladder\" she gets UTI frequent and she is seeing the specialist in January.    She was on Metronidazole medication for 14 days   And she was on Keflex for another 10 days    She has been in the bathroom for 5 X for loose BM  Half of Imodium tablets in the Am and in the PM  She stopped her probiotic when she stopped when she stop the antibiotic. - RN has advice her to continue taking her probiotics    She is dizzy but she is taking meclizine (ANTIVERT) 12.5 MG tablet   She has been able to drink water, but she also drinks sodas  She is having her  booster tomorrow    Symptoms/concern started months ago  January 6 she is seeing the specialist for her Bladder.      NO Pain just or discomfort 7/10 on her urinary track  She is able to urinate  She can see in her \"brief\" the color yellow and not dark.     Patient denies difficulty with breathing, chest pain, fever,or  nausea.    Patti Suárez MD  [facility]  Internal Medicine    Patient able to stand on her own.     Per protocol, disposition to appt with PCP for appt 24 hrs - she stated she has tried and not even a telemedicine has been available for her.  Home care      Care advise given, patients questions were answered  Reminded we will be here 24/7 and can call back if symptoms worsen   Patient verbalized understanding and agrees with plan    Christina Whitmore RN Nurse Triage Advisor 10:12 PM 11/10/2021  Reason for Disposition    [1] MODERATE diarrhea (e.g., 4-6 times / day more than normal) AND [2] age > 70 years    Additional Information    Negative: [1] SEVERE diarrhea (e.g., 7 or more times / day more than normal) AND [2] age > 60 years    Negative: [1] Constant abdominal pain AND [2] present > 2 hours    Negative: [1] Fever > 103 F (39.4 C) AND [2] not able to get the fever down using " Fever Care Advice    Negative: [1] Drinking very little AND [2] dehydration suspected (e.g., no urine > 12 hours, very dry mouth, very lightheaded)    Negative: Patient sounds very sick or weak to the triager    Negative: [1] SEVERE abdominal pain (e.g., excruciating) AND [2] present > 1 hour    Negative: [1] SEVERE abdominal pain AND [2] age > 60    Negative: [1] Blood in the stool AND [2] moderate or large amount of blood    Negative: Black or tarry bowel movements  (Exception: chronic-unchanged  black-grey bowel movements AND is taking iron pills or Pepto-bismol)    Negative: Vomiting also present and worse than the diarrhea    Negative: [1] Blood in stool AND [2] without diarrhea    Negative: Diarrhea in a cancer patient who is currently (or recently) receiving chemotherapy or radiation therapy, or cancer patient who has metastatic or end-stage cancer and is receiving palliative care    Negative: Shock suspected (e.g., cold/pale/clammy skin, too weak to stand, low BP, rapid pulse)    Negative: Difficult to awaken or acting confused (e.g., disoriented, slurred speech)    Negative: Sounds like a life-threatening emergency to the triager    Negative: [1] MODERATE diarrhea (e.g., 4-6 times / day more than normal) AND [2] present > 48 hours (2 days)    Negative: [1] SEVERE diarrhea (e.g., 7 or more times / day more than normal) AND [2] present > 24 hours (1 day)    Protocols used: DIARRHEA-A-

## 2021-11-17 ENCOUNTER — ANTICOAGULATION THERAPY VISIT (OUTPATIENT)
Dept: ANTICOAGULATION | Facility: CLINIC | Age: 81
End: 2021-11-17
Payer: COMMERCIAL

## 2021-11-17 DIAGNOSIS — I48.21 PERMANENT ATRIAL FIBRILLATION (H): Primary | ICD-10-CM

## 2021-11-17 DIAGNOSIS — I48.20 CHRONIC ATRIAL FIBRILLATION (H): ICD-10-CM

## 2021-11-17 DIAGNOSIS — Z79.01 LONG TERM (CURRENT) USE OF ANTICOAGULANTS: ICD-10-CM

## 2021-11-17 LAB — INR (EXTERNAL): 2.5 (ref 0.9–1.1)

## 2021-11-17 NOTE — PROGRESS NOTES
ANTICOAGULATION MANAGEMENT     Savanna Rehman 81 year old female is on warfarin with therapeutic INR result. (Goal INR 2.0-3.0)    Recent labs: (last 7 days)     11/17/21  1310   INR 2.5*       ASSESSMENT     Source(s): Chart Review, Patient/Caregiver Call and Home Care/Facility Nurse       Warfarin doses taken: Warfarin taken as instructed    Diet: No new diet changes identified    New illness, injury, or hospitalization: No, may have symptoms of UTI and is trying to contact clinic    Medication/supplement changes: completed keflex on 11/9/21    Signs or symptoms of bleeding or clotting: No    Previous INR: Therapeutic last visit; previously outside of goal range    Additional findings: None     PLAN     Recommended plan for no diet, medication or health factor changes affecting INR     Dosing Instructions: Continue your current warfarin dose with next INR in 1 week       Summary  As of 11/17/2021    Full warfarin instructions:  1.25 mg every Fri; 2.5 mg all other days   Next INR check:  11/24/2021             Telephone call with home care nurse Roseline who agrees to plan and repeated back plan correctly    Orders given to  Homecare nurse/facility to recheck    Education provided: Please call back if any changes to your diet, medications or how you've been taking warfarin    Plan made per [facility] anticoagulation protocol    Roya Sky RN  Anticoagulation Clinic  11/17/2021    _______________________________________________________________________     Anticoagulation Episode Summary     Current INR goal:  2.0-3.0   TTR:  70.3 % (1 y)   Target end date:  Indefinite   Send INR reminders to:  [facility]KK[facility] CHASE    Indications    Permanent atrial fibrillation (H) [I48.21]  Chronic atrial fibrillation (H) [I48.20]  Long term (current) use of anticoagulants [Z79.01]           Comments:  Home care          Anticoagulation Care Providers     Provider Role Specialty Phone number    Patti Suárez MD  Referring Internal Medicine [phone]

## 2021-11-18 ENCOUNTER — NURSE TRIAGE (OUTPATIENT)
Dept: NURSING | Facility: CLINIC | Age: 81
End: 2021-11-18
Payer: COMMERCIAL

## 2021-11-18 NOTE — TELEPHONE ENCOUNTER
Savanna reports that when her blood pressure was checked today by the home visiting nurse, it was low - SBP in the 90's    She states that her blood pressure is normally lower - ~110 but today it was lower than usual    The measurements today were all done with her own Wrist BP monitor.    She was advised to continue to monitor it at home    She reports readings with her wrist blood pressure monitor:BP   91/44   95/48   91/44    She also reports that she has recently been treated for a UTI and prior to that for C-difficile    She reports that tonight she has been sipping on a drink with Vodka. She stated that she had not had any alcohol in 2 months.  Advised that alcohol can decrease her blood pressure and instructed to avoid alcohol until she gets an OK from a provider    Advised to see PCP within 2-3 days  Transferred to scheduling    COVID 19 Nurse Triage Plan/Patient Instructions    Please be aware that novel coronavirus (COVID-19) may be circulating in the community. If you develop symptoms such as fever, cough, or SOB or if you have concerns about the presence of another infection including coronavirus (COVID-19), please contact your health care provider or visit [email or web address].     Disposition/Instructions    Virtual Visit with provider recommended. Reference Visit Selection Guide.  In-Person Visit with provider recommended. Reference Visit Selection Guide.    Thank you for taking steps to prevent the spread of this virus.  o Limit your contact with others.  o Wear a simple mask to cover your cough.  o Wash your hands well and often.    Resources    [facility]: About COVID-19: [email or web address]    CDC: What to Do If You're Sick: www.cdc.gov/coronavirus/2019-ncov/about/steps-when-sick.html    CDC: Ending Home Isolation: www.cdc.gov/coronavirus/2019-ncov/hcp/disposition-in-home-patients.html     CDC: Caring for Someone:  "www.cdc.gov/coronavirus/2019-ncov/if-you-are-sick/care-for-someone.html     [facility]: Interim Guidance for Hospital Discharge to Home: www.health.[facility].[place].us/diseases/coronavirus/hcp/hospdischarge.pdf    [facility] clinical trials (COVID-19 research studies): [email or web address]-clinical-trials     Below are the COVID-19 hotlines at the [place] Department of Health ([facility]). Interpreters are available.   o For health questions: Call 831-504-1466 or 1-912.543.1937 (7 a.m. to 7 p.m.)  o For questions about schools and childcare: Call 581-825-7430 or 1-757.359.2493 (7 a.m. to 7 p.m.)     Marifer Pulido RN  [facility] Nurse Advisors      Reason for Disposition    [1] Fall in systolic BP > 20 mm Hg from normal AND [2] NOT dizzy, lightheaded, or weak    Additional Information    Negative: Started suddenly after an allergic medicine, an allergic food, or bee sting    Negative: Difficult to awaken or acting confused (e.g., disoriented, slurred speech)    Negative: Shock suspected (e.g., cold/pale/clammy skin, too weak to stand, low BP, rapid pulse)    Negative: Fainted    Negative: [1] Systolic BP < 90 AND [2] dizzy, lightheaded, or weak    Negative: Chest pain    Negative: Bleeding (e.g., vomiting blood, rectal bleeding or tarry stools, severe vaginal bleeding)(Exception: fainted from sight of small amount of blood; small cut or abrasion)    Negative: Extra heart beats or heart is beating fast  (i.e., \"palpitations\")    Negative: Sounds like a life-threatening emergency to the triager    Negative: [1] Systolic BP < 80 AND [2] NOT dizzy, lightheaded or weak    Negative: [1] Fall in systolic BP > 20 mm Hg from normal AND [2] dizzy, lightheaded, or weak    Negative: Patient sounds very sick or weak to the triager    Negative: Abdominal pain    Negative: Fever > 100.4 F (38.0 C)    Negative: Major surgery in the past month    Negative: [1] Drinking very little AND [2] dehydration suspected (e.g., no " urine > 12 hours, very dry mouth, very lightheaded)    Negative: [1] Systolic BP < 90 AND [2] NOT dizzy, lightheaded or weak    Negative: [1] Systolic BP  AND [2] taking blood pressure medications AND [3] dizzy, lightheaded or weak    Negative: [1] Systolic BP  AND [2] taking blood pressure medications AND [3] NOT dizzy, lightheaded or weak    Protocols used: LOW BLOOD PRESSURE-A-AH

## 2021-11-19 ENCOUNTER — TELEPHONE (OUTPATIENT)
Dept: INTERNAL MEDICINE | Facility: CLINIC | Age: 81
End: 2021-11-19

## 2021-11-19 ENCOUNTER — MEDICAL CORRESPONDENCE (OUTPATIENT)
Dept: HEALTH INFORMATION MANAGEMENT | Facility: CLINIC | Age: 81
End: 2021-11-19

## 2021-11-21 ENCOUNTER — MEDICAL CORRESPONDENCE (OUTPATIENT)
Dept: HEALTH INFORMATION MANAGEMENT | Facility: CLINIC | Age: 81
End: 2021-11-21

## 2021-11-21 DIAGNOSIS — Z53.9 DIAGNOSIS NOT YET DEFINED: Primary | ICD-10-CM

## 2021-11-21 PROCEDURE — 99207 PR MD RECERTIFICATION HHA PT: CPT | Performed by: INTERNAL MEDICINE

## 2021-11-22 ENCOUNTER — NURSE TRIAGE (OUTPATIENT)
Dept: NURSING | Facility: CLINIC | Age: 81
End: 2021-11-22
Payer: COMMERCIAL

## 2021-11-23 NOTE — TELEPHONE ENCOUNTER
"Pt called in states out meclizine HCl12.5 mg  medication in 3 days.  The Pt ask the refill before the holiday.      Please advise.      Eugene [facility] Nurse Advisor 11/22/2021 10:12 PM      Reason for Disposition    [1] Caller has NON-URGENT medication question about med that PCP prescribed AND [2] triager unable to answer question    Additional Information    Negative: Drug overdose and triager unable to answer question    Negative: Caller requesting information unrelated to medicine    Negative: Caller requesting a prescription for Strep throat and has a positive culture result    Negative: Rash while taking a medication or within 3 days of stopping it    Negative: Immunization reaction suspected    Negative: [1] Asthma and [2] having symptoms of asthma (cough, wheezing, etc.)    Negative: [1] Influenza symptoms AND [2] anti-viral med prescription request, such as Tamiflu    Negative: [1] Symptom of illness (e.g., headache, abdominal pain, earache, vomiting) AND [2] more than mild    Negative: MORE THAN A DOUBLE DOSE of a prescription or over-the-counter (OTC) drug    Negative: [1] DOUBLE DOSE (an extra dose or lesser amount) of over-the-counter (OTC) drug AND [2] any symptoms (e.g., dizziness, nausea, pain, sleepiness)    Negative: [1] DOUBLE DOSE (an extra dose or lesser amount) of prescription drug AND [2] any symptoms (e.g., dizziness, nausea, pain, sleepiness)    Negative: Took another person's prescription drug    Negative: [1] DOUBLE DOSE (an extra dose or lesser amount) of prescription drug AND [2] NO symptoms (Exception: a double dose of antibiotics)    Negative: Diabetes drug error or overdose (e.g., took wrong type of insulin or took extra dose)    Negative: [1] Request for URGENT new prescription or refill of \"essential\" medication (i.e., likelihood of harm to patient if not taken) AND [2] triager unable to fill per unit policy    Negative: [1] Prescription not at pharmacy AND [2] was prescribed " by PCP recently    Negative: [1] Pharmacy calling with prescription questions AND [2] triager unable to answer question    Negative: [1] Caller has URGENT medication question about med that PCP or specialist prescribed AND [2] triager unable to answer question    Protocols used: MEDICATION QUESTION CALL-A-AH

## 2021-11-30 ENCOUNTER — NURSE TRIAGE (OUTPATIENT)
Dept: INTERNAL MEDICINE | Facility: CLINIC | Age: 81
End: 2021-11-30
Payer: COMMERCIAL

## 2021-11-30 ENCOUNTER — CARE COORDINATION (OUTPATIENT)
Dept: SLEEP MEDICINE | Facility: CLINIC | Age: 81
End: 2021-11-30
Payer: COMMERCIAL

## 2021-11-30 NOTE — PROGRESS NOTES
Received oxygen therapy renewal form from [facility].  To Dr. Castillo for review and signature.

## 2021-11-30 NOTE — TELEPHONE ENCOUNTER
Patient had to cancel her px for 12/2/2021 because she is having watery stools again. She is not sure if she needs more antibiotics.  She made an appt for 2/2/2022 for her px and was wondering if she can be worked in sooner per [facility] scheduling. Ok to call and ishmael [phone]

## 2021-12-01 ENCOUNTER — ANTICOAGULATION THERAPY VISIT (OUTPATIENT)
Dept: ANTICOAGULATION | Facility: CLINIC | Age: 81
End: 2021-12-01
Payer: COMMERCIAL

## 2021-12-01 DIAGNOSIS — I48.21 PERMANENT ATRIAL FIBRILLATION (H): Primary | ICD-10-CM

## 2021-12-01 DIAGNOSIS — I48.20 CHRONIC ATRIAL FIBRILLATION (H): ICD-10-CM

## 2021-12-01 DIAGNOSIS — Z79.01 LONG TERM (CURRENT) USE OF ANTICOAGULANTS: ICD-10-CM

## 2021-12-01 LAB — INR (EXTERNAL): 2.4 (ref 0.4–1.1)

## 2021-12-01 NOTE — TELEPHONE ENCOUNTER
"Nurse Triage SBAR    Situation: loose stools    Background: Treated for C.diff on 10/10-10/24 with flagyl and vancomycin. Was also treated for UTI on 10/29 with keflex for 7 days.     Assessment:  Has about 5 stools/day, with urgency. States she hasn't had a solid stool in the last year and a half. Patient wants to know if she needs additional abx.  (See information below for more triage details)    Recommendation: Routing to provider to review and advise. Dr. Lauren doesn't have any openings until end of December. Patient states there's another provider she can see but wasn't sure who.     Protocol Recommended Disposition: Routine office follow-up appointment - advised to increase fluids, will have [place] team call her back to schedule.     Patient stated an understanding and agreed with plan.     Asya Gramajo RN  [facility]          Reason for Disposition    Diarrhea is a chronic symptom (recurrent or ongoing AND lasting > 4 weeks)    MILD-MODERATE diarrhea (e.g., 1-6 times / day more than normal)    Answer Assessment - Initial Assessment Questions  1. DIARRHEA SEVERITY: \"How bad is the diarrhea?\" \"How many extra stools have you had in the past 24 hours than normal?\"     - NO DIARRHEA (SCALE 0)    - MILD (SCALE 1-3): Few loose or mushy BMs; increase of 1-3 stools over normal daily number of stools; mild increase in ostomy output.    -  MODERATE (SCALE 4-7): Increase of 4-6 stools daily over normal; moderate increase in ostomy output.  * SEVERE (SCALE 8-10; OR 'WORST POSSIBLE'): Increase of 7 or more stools daily over normal; moderate increase in ostomy output; incontinence.       When I don't take immodium, probabably 5/day. Take a half immodium then I have more stools, but less in amount.     2. ONSET: \"When did the diarrhea begin?\"       Couple months, haven't had a really formed stool in a year, 1.5 years - been stable    3. BM CONSISTENCY: \"How loose or watery is the diarrhea?\"       " "Pudding consistency.     4. VOMITING: \"Are you also vomiting?\" If so, ask: \"How many times in the past 24 hours?\"       No    5. ABDOMINAL PAIN: \"Are you having any abdominal pain?\" If yes: \"What does it feel like?\" (e.g., crampy, dull, intermittent, constant)       Have cramping, not sure if its bladder    6. ABDOMINAL PAIN SEVERITY: If present, ask: \"How bad is the pain?\"  (e.g., Scale 1-10; mild, moderate, or severe)    - MILD (1-3): doesn't interfere with normal activities, abdomen soft and not tender to touch     - MODERATE (4-7): interferes with normal activities or awakens from sleep, tender to touch     - SEVERE (8-10): excruciating pain, doubled over, unable to do any normal activities        N/a    7. ORAL INTAKE: If vomiting, \"Have you been able to drink liquids?\" \"How much fluids have you had in the past 24 hours?\"      Not drinking much - don't think about it, since 9:15 have had 2 sips of diet coke.     8. HYDRATION: \"Any signs of dehydration?\" (e.g., dry mouth [not just dry lips], too weak to stand, dizziness, new weight loss) \"When did you last urinate?\"      On my third bottle of medicine - on meclizine. Dizziness much better now.     9. EXPOSURE: \"Have you traveled to a foreign country recently?\" \"Have you been exposed to anyone with diarrhea?\" \"Could you have eaten any food that was spoiled?\"      N/a    10. ANTIBIOTIC USE: \"Are you taking antibiotics now or have you taken antibiotics in the past 2 months?\"        Finished course of flagyl on 10/24  95/53, 93/51 - couple weeks ago - no change at that time.     111/55 - today hour ago.     11. OTHER SYMPTOMS: \"Do you have any other symptoms?\" (e.g., fever, blood in stool)        No     12. PREGNANCY: \"Is there any chance you are pregnant?\" \"When was your last menstrual period?\"        n/a    Protocols used: DIARRHEA-A-OH      "

## 2021-12-01 NOTE — PROGRESS NOTES
ANTICOAGULATION MANAGEMENT     Savanna Rehman 81 year old female is on warfarin with therapeutic INR result. (Goal INR 2.0-3.0)    Recent labs: (last 7 days)     12/01/21  1306   INR 2.4*       ASSESSMENT     Source(s): Chart Review and Home Care/Facility Nurse       Warfarin doses taken: Warfarin taken as instructed    Diet: No new diet changes identified    New illness, injury, or hospitalization: Yes: having some diarrhea-soft stools, non-formed    Medication/supplement changes: None noted    Signs or symptoms of bleeding or clotting: No    Previous INR: Therapeutic last visit; previously outside of goal range    Additional findings: None     PLAN     Recommended plan for no diet, medication or health factor changes affecting INR     Dosing Instructions: Continue your current warfarin dose with next INR in 2 weeks       Summary  As of 12/1/2021    Full warfarin instructions:  1.25 mg every Fri; 2.5 mg all other days   Next INR check:  12/15/2021             Telephone call with Savanna who agrees to plan and repeated back plan correctly    Orders given to  Homecare nurse/facility to recheck    Education provided: Importance of notifying clinic for changes in medications; a sooner lab recheck maybe needed. and Importance of notifying clinic for diarrhea, nausea/vomiting, reduced intake, and/or illness; a sooner lab recheck maybe needed.    Plan made per [facility] anticoagulation protocol    Roya Sky, RN  Anticoagulation Clinic  12/1/2021    _______________________________________________________________________     Anticoagulation Episode Summary     Current INR goal:  2.0-3.0   TTR:  70.3 % (1 y)   Target end date:  Indefinite   Send INR reminders to:  NIKKI CHASE    Indications    Permanent atrial fibrillation (H) [I48.21]  Chronic atrial fibrillation (H) [I48.20]  Long term (current) use of anticoagulants [Z79.01]           Comments:  Home care          Anticoagulation Care Providers     Provider Role  Specialty Phone number    Patti Suárez MD Referring Internal Medicine [phone]

## 2021-12-06 ENCOUNTER — TELEPHONE (OUTPATIENT)
Dept: INTERNAL MEDICINE | Facility: CLINIC | Age: 81
End: 2021-12-06
Payer: COMMERCIAL

## 2021-12-06 ENCOUNTER — TELEPHONE (OUTPATIENT)
Dept: ANTICOAGULATION | Facility: CLINIC | Age: 81
End: 2021-12-06
Payer: COMMERCIAL

## 2021-12-06 DIAGNOSIS — Z79.01 LONG TERM (CURRENT) USE OF ANTICOAGULANTS: ICD-10-CM

## 2021-12-06 DIAGNOSIS — K52.9 CHRONIC DIARRHEA: Primary | ICD-10-CM

## 2021-12-06 DIAGNOSIS — I48.21 PERMANENT ATRIAL FIBRILLATION (H): Primary | ICD-10-CM

## 2021-12-06 DIAGNOSIS — I48.20 CHRONIC ATRIAL FIBRILLATION (H): ICD-10-CM

## 2021-12-06 RX ORDER — METRONIDAZOLE 500 MG/1
500 TABLET ORAL 2 TIMES DAILY
Qty: 14 TABLET | Refills: 0 | Status: SHIPPED | OUTPATIENT
Start: 2021-12-06 | End: 2021-12-13

## 2021-12-06 NOTE — TELEPHONE ENCOUNTER
See 11/30 phone message.  Patient has an appt scheduled with Dr. Carrillo 12/7.   BENITA Graham R.N.

## 2021-12-06 NOTE — TELEPHONE ENCOUNTER
1.  I am glad that RN scheduled this patient with Dr. Carrillo tomorrow and I insist she keeps this appointment  2.  I did send metronidazole prescription before I noticed the tomorrow appointment

## 2021-12-06 NOTE — CONFIDENTIAL NOTE
"Patient calls again as she has again developed what she knows is C. Difficile.  She reports stools of \"greasy mush\"  that hit with great urgency.  She has anywhere from 3-6 stools per day, denies black, burgundy or bloody stools. . Has cramping pains that are nearly constant.  Denies fever.  Did wake up with nausea at 3:00 a.m. today, fell asleep again and by 5:30 a.m. she vomited brown liquid x1.   She insists she needs only the Flagyl rx renewed for 42 tablets like before sent to Nae Graham R.N.        "

## 2021-12-06 NOTE — TELEPHONE ENCOUNTER
Patient advised of MD message and is thankful for the Flagyl refill.  She also states she will be coming in to the appt tomorrow with Dr. Carrillo.  BENITA Graham R.N.

## 2021-12-06 NOTE — TELEPHONE ENCOUNTER
metroNIDAZOLE (FLAGYL) 500 MG tablet   Last Written Prescription Date:  10/10/21  Last Fill Quantity: 42,   # refills: 0  Last Office Visit: VV 10/07/21  Future Office visit:    Next 5 appointments (look out 90 days)    Dec 07, 2021 11:00 AM  (Arrive by 10:40 AM)  Office Visit with Yaw Carrillo MD  [facility] ([facility] ) [address] [place] [address]  [phone]   Jan 06, 2022 12:30 PM  (Arrive by 12:15 PM)  Return Visit with Deisy Wilson MD  [facility] [place] ([facility] ) [address]  [address] [facility] [address] [phone]   Feb 02, 2022 12:30 PM  (Arrive by 12:10 PM)  Annual Wellness Visit with Patti Suárez MD  [facility] ([facility] ) [address] [facility] [address]  [phone]           Routing refill request to provider for review/approval because:  Patient has OV scheduled for tomorrow with Dr. Carrillo but does not feel that she should be coming in a [employer] vehicle because they are too dirty and it is dangerous for her.  Patient states that Zoltan Perez told her there is no reason to do a stool sample because it will just come back normal.  Patient states if she comes in there is no part of her body they can examine  Besides her incredibly sore stomach.    Patient would like RX called in before 2:00 pm so her daughter leo opick up.

## 2021-12-06 NOTE — TELEPHONE ENCOUNTER
ANTICOAGULATION  MANAGEMENT     Interacting Medication Review    Interacting medication(s): Metronidazole (Flagyl) with warfarin.    Duration: 7 days (12/6/21 to 12/13/21)    Indication: chronic diarrhea    New medication?: Yes, interaction may increase INR and risk of bleeding       PLAN     Continue current warfarin dose. Recommend to check INR on 12/7/21 at provider visit  Added to provider appointment notes.     Spoke with Savanna to notify that [facility] would like her to have her INR checked tomorrow due to episodes of vomiting/diarrhea and starting new medication.     Anticoagulation Calendar updated    Dawood Martinez RN

## 2021-12-07 ENCOUNTER — TELEPHONE (OUTPATIENT)
Dept: INTERNAL MEDICINE | Facility: CLINIC | Age: 81
End: 2021-12-07

## 2021-12-07 NOTE — TELEPHONE ENCOUNTER
Patient calls with question on metroNIDAZOLE (FLAGYL) 500 MG tablet.  Patient states last time it was 3 pills per day for 14 days. Please verify and call patient.

## 2021-12-07 NOTE — TELEPHONE ENCOUNTER
Rx remains the same.  The patient had an appointment today with Dr. Carrillo.  I discussed with Dr. Carrillo about her case and the treatment.  Unfortunately as many times before patient canceled the appointment.      Patient is scheduled to see Bel Stoner tomorrow    ====================    This is a message for Bel Stoner only    Dr. Lauren prescribed vancomycin treatment in October.  Please clarify with the patient why she insists on metronidazole.  I would recommend fidaxomicin treatment versus metronidazole treatment for recurrent C. difficile

## 2021-12-07 NOTE — TELEPHONE ENCOUNTER
Spoke with patient about the RX she DOES want. It is vancomycin not metronidazole. However, the Vanco is $$ at 68 -100$. She asked if there are ways of getting a reduced cost or no cost.      The Metro causes nausea and after the course of medication it dd not clear up the Cdiff.     She will see PRASANNA Stoner 12-8-2021.

## 2021-12-08 NOTE — TELEPHONE ENCOUNTER
"This patient called in and stated will be late for her 1 PM appointment; as of 1:16 PM has not showed.  I have a full clinic and so she will need to reschedule.  I see she has a referral to Infectious Disease for her \"chronic diarrhea and suspected C. Diff\".  Help the patient schedule that appointment.  To treat C Diff properly, she needs to be taking both Flagyl and Vancomycin; there are alternatives but they are more expensive.  Please reach out to the patient.  "

## 2021-12-08 NOTE — TELEPHONE ENCOUNTER
Patient notified of message. Patient was informed that a referral was placed by Dr. Lauren for patient to see the Infectious Disease and that they will be contacting patient for the appointment.

## 2021-12-14 ENCOUNTER — TELEPHONE (OUTPATIENT)
Dept: INTERNAL MEDICINE | Facility: CLINIC | Age: 81
End: 2021-12-14
Payer: COMMERCIAL

## 2021-12-14 NOTE — TELEPHONE ENCOUNTER
Called Roseline with [place] [facility] who scheduled to see patient tomorrow and will check INR at that visit.  Per Roseline, patient has been very busy and has not been seen by home care due to family visiting, etc.    EMILY Johnson, RN  Anticoagulation Clinic

## 2021-12-15 ENCOUNTER — ANTICOAGULATION THERAPY VISIT (OUTPATIENT)
Dept: ANTICOAGULATION | Facility: CLINIC | Age: 81
End: 2021-12-15
Payer: COMMERCIAL

## 2021-12-15 DIAGNOSIS — I48.21 PERMANENT ATRIAL FIBRILLATION (H): Primary | ICD-10-CM

## 2021-12-15 DIAGNOSIS — Z79.01 LONG TERM (CURRENT) USE OF ANTICOAGULANTS: ICD-10-CM

## 2021-12-15 DIAGNOSIS — I48.20 CHRONIC ATRIAL FIBRILLATION (H): ICD-10-CM

## 2021-12-15 LAB — INR (EXTERNAL): 2.1 (ref 0.9–1.1)

## 2021-12-15 NOTE — PROGRESS NOTES
ANTICOAGULATION MANAGEMENT     Savanna Rehman 81 year old female is on warfarin with therapeutic INR result. (Goal INR 2.0-3.0)    Recent labs: (last 7 days)     12/15/21  1312   INR 2.1*       ASSESSMENT     Source(s): Chart Review, Patient/Caregiver Call and Home Care/Facility Nurse       Warfarin doses taken: Warfarin taken as instructed    Diet: No new diet changes identified    New illness, injury, or hospitalization: No, diarrhea is improving    Medication/supplement changes: Vanco 14 day course (dates: 12/9-12/22) No interaction anticipated    Signs or symptoms of bleeding or clotting: No    Previous INR: Therapeutic last 2(+) visits    Additional findings: None     PLAN     Recommended plan for no diet, medication or health factor changes affecting INR     Dosing Instructions: Continue your current warfarin dose with next INR in 2 weeks       Summary  As of 12/15/2021    Full warfarin instructions:  1.25 mg every Fri; 2.5 mg all other days   Next INR check:  12/29/2021             Telephone call with Savanna who agrees to plan and repeated back plan correctly    Orders given to  Homecare nurse/facility to recheck    Education provided: Goal range and significance of current result, Importance of therapeutic range, Importance of following up at instructed interval and Importance of taking warfarin as instructed    Plan made per [facility] anticoagulation protocol    Roya Sky RN  Anticoagulation Clinic  12/15/2021    _______________________________________________________________________     Anticoagulation Episode Summary     Current INR goal:  2.0-3.0   TTR:  70.3 % (1 y)   Target end date:  Indefinite   Send INR reminders to:  NIKKI CHASE    Indications    Permanent atrial fibrillation (H) [I48.21]  Chronic atrial fibrillation (H) [I48.20]  Long term (current) use of anticoagulants [Z79.01]           Comments:  Home care          Anticoagulation Care Providers     Provider Role Specialty Phone  number    Patti Suárez MD Referring Internal Medicine [phone]

## 2021-12-16 NOTE — PROGRESS NOTES
Orders from [facility] have been signed, faxed and scanned into chart.    TITA Bingham

## 2021-12-17 DIAGNOSIS — B37.2 CANDIDIASIS OF SKIN: ICD-10-CM

## 2021-12-17 RX ORDER — NYSTATIN 100000 [USP'U]/G
POWDER TOPICAL
Qty: 30 G | Refills: 1 | Status: SHIPPED | OUTPATIENT
Start: 2021-12-17 | End: 2022-10-20

## 2021-12-17 NOTE — TELEPHONE ENCOUNTER
"Patient will be tested for coivd today. She has questions how to manage this  1.\"Monster headache\". She stated a history of migrains  2.folds of skin at leg/groin \"raw meat\"  3.sob SHE stated it is due to exertion upon getting up and down to go to the bathroom so often     Lives in a senior residence home   "

## 2021-12-17 NOTE — TELEPHONE ENCOUNTER
"Call to patient. States she feels strongly that she has a migraine. States she was promised a little over 2 hours ago that they would do a COVID test but it hasn't been done yet. States she has chills but she feels that is related to the headache. States she doesn't have any other symptoms. Patient is fully vaccinated. Patient states she is currently on an antibiotic for a colon infection. States she also has numbness/tingling in her fingers and the back of her neck and across her temples are throbbing. States these are the symptoms she has with her migraines. Patient states she feels nauseated due to the headache so she has not eaten today. Patient typically takes Fiorinal for migraines but has not tried one today due to not eating today. Patient also reports shortness of breath. States this is a new symptom. She feels it is related to the bacterial infection. States the area between her legs and body is red \"like beef steak\". States she probably isn't changing her brief often enough. Patient has been using Desitin. Patient has order for Nystatin powder but does not have any currently. Refill pended. Patient will try taking a Zofran to see if she can then eat something. Patient will also try taking a Fiorinal to see if that helps with the headache. Patient is waiting for staff to come to do the COVID test. Patient advised to call back with further questions and agrees.     Routing refill request to provider for review/approval because:  Previously ordered by Dr. Wilson        "

## 2021-12-22 ENCOUNTER — PRE VISIT (OUTPATIENT)
Dept: UROLOGY | Facility: CLINIC | Age: 81
End: 2021-12-22
Payer: COMMERCIAL

## 2021-12-22 NOTE — TELEPHONE ENCOUNTER
Reason for visit: Review UDS     Relevant information: urgency/frequency, Omar    Records/imaging/labs/orders: all appointments scheduled    Pt called: no need for a call    At Rooming: Standard

## 2021-12-30 ENCOUNTER — ANTICOAGULATION THERAPY VISIT (OUTPATIENT)
Dept: ANTICOAGULATION | Facility: CLINIC | Age: 81
End: 2021-12-30
Payer: COMMERCIAL

## 2021-12-30 ENCOUNTER — DOCUMENTATION ONLY (OUTPATIENT)
Dept: INTERNAL MEDICINE | Facility: CLINIC | Age: 81
End: 2021-12-30
Payer: COMMERCIAL

## 2021-12-30 DIAGNOSIS — Z79.01 LONG TERM (CURRENT) USE OF ANTICOAGULANTS: ICD-10-CM

## 2021-12-30 DIAGNOSIS — I48.21 PERMANENT ATRIAL FIBRILLATION (H): Primary | ICD-10-CM

## 2021-12-30 DIAGNOSIS — I48.20 CHRONIC ATRIAL FIBRILLATION (H): ICD-10-CM

## 2021-12-30 LAB — INR (EXTERNAL): 2.5 (ref 0.9–1.1)

## 2021-12-30 NOTE — PROGRESS NOTES
ANTICOAGULATION     Savanna Rehman is overdue for INR check.      Spoke with home care nurse Roseline, she reports she will be seeing patietn this afternoon as she was delayed getting back home due to [place]ree out of town.    Iris Kemp RN

## 2021-12-30 NOTE — PROGRESS NOTES
ANTICOAGULATION MANAGEMENT     Savanna Rehman 81 year old female is on warfarin with therapeutic INR result. (Goal INR 2.0-3.0)    Recent labs: (last 7 days)     12/30/21  1314   INR 2.5*       ASSESSMENT     Source(s): Chart Review and Home Care/Facility Nurse       Warfarin doses taken: Warfarin taken as instructed    Diet: No new diet changes identified    New illness, injury, or hospitalization: No    Medication/supplement changes: None noted    Signs or symptoms of bleeding or clotting: No    Previous INR: Therapeutic last 2(+) visits    Additional findings: None     PLAN     Recommended plan for no diet, medication or health factor changes affecting INR     Dosing Instructions: Continue your current warfarin dose with next INR in 3 weeks       Summary  As of 12/30/2021    Full warfarin instructions:  1.25 mg every Fri; 2.5 mg all other days   Next INR check:  1/20/2022             Telephone call with home care nurse Roseline who agrees to plan and repeated back plan correctly    Orders given to  Homecare nurse/facility to recheck    Education provided: Please call back if any changes to your diet, medications or how you've been taking warfarin    Plan made per [facility] anticoagulation protocol    Roya Sky, RN  Anticoagulation Clinic  12/30/2021    _______________________________________________________________________     Anticoagulation Episode Summary     Current INR goal:  2.0-3.0   TTR:  72.9 % (1 y)   Target end date:  Indefinite   Send INR reminders to:  NIKKI CHASE    Indications    Permanent atrial fibrillation (H) [I48.21]  Chronic atrial fibrillation (H) [I48.20]  Long term (current) use of anticoagulants [Z79.01]           Comments:  Home care          Anticoagulation Care Providers     Provider Role Specialty Phone number    Patti Suárez MD Referring Internal Medicine [phone]

## 2022-01-05 ENCOUNTER — TELEPHONE (OUTPATIENT)
Dept: INTERNAL MEDICINE | Facility: CLINIC | Age: 82
End: 2022-01-05
Payer: COMMERCIAL

## 2022-01-07 NOTE — TELEPHONE ENCOUNTER
Patient needs appointment   I have not seen the patient in the office for 10 months  
"Per Lorena Stoner's note from 12/7/2021 telephone encounter: \"I see she has a referral to Infectious Disease for her \"chronic diarrhea and suspected C. Diff\".  Help the patient schedule that appointment.  To treat C Diff properly, she needs to be taking both Flagyl and Vancomycin; there are alternatives but they are more expensive.\"    Call to patient. Informed patient to speak to ID about her continued loose stool. Patient states she was told to go back to Dr. Lauren about this.   Patient wants to have Dr. Lauren review and advise on next steps for her continued loose stool.    Patient reports she also needs a physical therapy order because she \"uses the walker constantly.\" \"I can't walk without a walker.\" This RN informed patient that Dr. Lauren has already placed an order for physical therapy on 11/3/2021.     Patient states she has not been able to go to physical therapy because of her loose stools. Patient states \"their policy is that if you cancel 3 times, you need a new referral.\"     Patient doesn't want a new referral for physical therapy at this time until her loose stool is resolved. Patient just wanted provider to be aware that she isn't seeing physical therapy. Patient will reach out to clinic when she is ready for physical therapy order.       Roxy LOREDO RN   [facility]    "
Left detailed message per consent to communicate.   
Patient c/o continued frequent loose stool. She has had to cancel many appts due to the need to be near the bathroom. Patient stated she is at her whitts end.     Patient stated she is being treated for cdiff which she thinks the meds are not helping.        Patient also needs new order to [facility] as her current order has . She thinks it is for PHYSICAL THERAPY      Best number to call [phone]         
29-Jul-2017 05:20

## 2022-01-09 ENCOUNTER — TELEPHONE (OUTPATIENT)
Dept: INTERNAL MEDICINE | Facility: CLINIC | Age: 82
End: 2022-01-09
Payer: COMMERCIAL

## 2022-01-09 NOTE — TELEPHONE ENCOUNTER
Reason for call:  Other   Patient called regarding (reason for call): pt is been having pain for 2 weeks since medication was switched, pt is having pain in low stomach area.     Additional comments: please reach out to pt since the soonest appointment isn't until feb     Phone number to reach patient:  Cell number on file:    Telephone Information:   Mobile [phone]       Best Time:  anytime    Can we leave a detailed message on this number?  YES    Travel screening: Not Applicable

## 2022-01-10 NOTE — TELEPHONE ENCOUNTER
Patient answered but then RN could not hear her.  Called her back again and the ringing stopped but could not hear patient again.  Asked that she call the clinic to see if we can hear her when she calls us back.  BENITA Graham R.N.

## 2022-01-11 NOTE — TELEPHONE ENCOUNTER
"Patient is asking for Vancomycin to treat what she believes is still C. Diff.  She is having constant lower abdominal pain again, it had resolved temporarily with Vanco previously and now returned to a lesser degree. She rates the pain 6-7 out of 10.  Pain wraps around both sides of lower abdmen to low back.  Nothing really makes the pain better or worse.    She denies fever, denies blood in stools or urine.  Yesterday she had one mushy stool, today 2 loose stools so far.  Stools are no longer \"greasy\".    Patient has 1/18/22 virtual visit and a 2/2/22 office visit for annual exam.  BENITA Graham R.N.  Wants Vancomycin rx sent to [facility] even though her payday isn't until 1/19 because [facility] has to order this med.  BENITA Graham R.N.        "

## 2022-01-12 DIAGNOSIS — R51.9 ACUTE INTRACTABLE HEADACHE, UNSPECIFIED HEADACHE TYPE: ICD-10-CM

## 2022-01-12 RX ORDER — BUTALBITAL, ASPIRIN, AND CAFFEINE 325; 50; 40 MG/1; MG/1; MG/1
1 CAPSULE ORAL EVERY 6 HOURS PRN
Qty: 30 CAPSULE | Refills: 0 | Status: SHIPPED | OUTPATIENT
Start: 2022-01-12 | End: 2022-05-27

## 2022-01-16 ENCOUNTER — NURSE TRIAGE (OUTPATIENT)
Dept: NURSING | Facility: CLINIC | Age: 82
End: 2022-01-16
Payer: COMMERCIAL

## 2022-01-16 NOTE — TELEPHONE ENCOUNTER
Pt called stating [facility] pharmacy will not let her  the FIORINAL , and she only has one left. Pt stated she used to get 10 capsules but this time Dr Lauren ordered 30 capsules and insurance will not cover.       RN called pharmacy and was told prior authorization is needed before they can fill the medication.     Pt was told message will be sent to  pcp and to also call tomorrw to fallow up, and she stated okay.        Chai Yanes RN  [facility] Nurse Advisors     Reason for Disposition    [1] Caller has NON-URGENT medication question about med that PCP prescribed AND [2] triager unable to answer question    Protocols used: MEDICATION QUESTION CALL-A-

## 2022-01-17 ENCOUNTER — TELEPHONE (OUTPATIENT)
Dept: INTERNAL MEDICINE | Facility: CLINIC | Age: 82
End: 2022-01-17
Payer: COMMERCIAL

## 2022-01-17 NOTE — TELEPHONE ENCOUNTER
Prior Authorization Approval    Authorization Effective Date: 12/18/2021  Authorization Expiration Date: 1/17/2023  Medication: butalbital-aspirin-caffeine (FIORINAL) -40 MG capsule- APPROVED   Approved Dose/Quantity:   Reference #:     Insurance Company: [employer] - Phone [phone] Fax [phone]  Expected CoPay:       CoPay Card Available:      Foundation Assistance Needed:    Which Pharmacy is filling the prescription (Not needed for infusion/clinic administered): [facility] [address] [place] [address]  Pharmacy Notified: Yes  Patient Notified:  **Instructed pharmacy to notify patient when script is ready to /ship.**     No

## 2022-01-17 NOTE — TELEPHONE ENCOUNTER
Patient calling stating that she needs this RX for Fiorinal and she cannot wait for her [employer] to send her the script in the mail. She stated that [facility] in [place] is needing specifics in regards to the dosing for prescription in order to fill. She needs this medication ASAP. I tried to explain to patient the communication, but she's only trying to receive her medication as soon as possible and cannot figure out why this is such a trouble some issue.     Please call patient once completed @ # [phone] OK to leave detailed message.     Rocky Hernandez

## 2022-01-17 NOTE — TELEPHONE ENCOUNTER
Central Prior Authorization Team  Phone: [phone]    PA Initiation    Medication: butalbital-aspirin-caffeine (FIORINAL) -40 MG capsule  Insurance Company: [employer] - Phone [phone] Fax [phone]  Pharmacy Filling the Rx: [facility] [address] [place] [address]  Filling Pharmacy Phone: [phone]  Filling Pharmacy Fax:    Start Date: 1/17/2022

## 2022-01-18 ENCOUNTER — TELEPHONE (OUTPATIENT)
Dept: INTERNAL MEDICINE | Facility: CLINIC | Age: 82
End: 2022-01-18

## 2022-01-18 ENCOUNTER — VIRTUAL VISIT (OUTPATIENT)
Dept: INTERNAL MEDICINE | Facility: CLINIC | Age: 82
End: 2022-01-18
Payer: COMMERCIAL

## 2022-01-18 DIAGNOSIS — E11.42 DIABETIC POLYNEUROPATHY ASSOCIATED WITH TYPE 2 DIABETES MELLITUS (H): ICD-10-CM

## 2022-01-18 DIAGNOSIS — K52.9 CHRONIC DIARRHEA: Primary | ICD-10-CM

## 2022-01-18 PROCEDURE — 99443 PR PHYSICIAN TELEPHONE EVALUATION 21-30 MIN: CPT | Mod: 95 | Performed by: INTERNAL MEDICINE

## 2022-01-18 RX ORDER — GLIPIZIDE 2.5 MG/1
2.5 TABLET, EXTENDED RELEASE ORAL 2 TIMES DAILY
Qty: 180 TABLET | Refills: 0 | Status: SHIPPED | OUTPATIENT
Start: 2022-01-18 | End: 2022-07-03

## 2022-01-18 NOTE — TELEPHONE ENCOUNTER
Prior Authorization Retail Medication Request    Medication/Dose: butalbital-aspirin-caffeine (FIORINAL) -40 MG capsule  ICD code (if different than what is on RX):    Previously Tried and Failed:    Rationale:      Insurance Name:  Medicare - [phone]  Insurance ID:  [id number]      Pharmacy Information (if different than what is on RX)  Name:  [facility]  Phone:

## 2022-01-18 NOTE — PROGRESS NOTES
Savanna is a 81 year old who is being evaluated via a billable telephone visit.      What phone number would you like to be contacted at? [phone]  How would you like to obtain your AVS? Mail a copy    This is a telephone encounter with the patient.       17:15 --- 17:36          Dr Lauren's note        ASSESSMENT & PLAN:                                                      (K52.9) Chronic diarrhea  (primary encounter diagnosis)  Comment: persistent Highly suspicious for recurrent CDiff  Very hard to treat because she has no transportation to the clinic and she c/o that the meds are expensive.   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       Review of Systems:                                                      ROS: negative for fever, chills, cough, wheezes, chest pain, shortness of breath, vomiting, abdominal pain, leg swelling       OBJECTIVE:           An actual physical exam can't be done during phone visit         PMHx: reviewed  Past Medical History:   Diagnosis Date     Anemia     Iron Deficiency anemia     Atrial fibrillation (H)      CAD (coronary artery disease)     non-obstructive     Chronic pain     neck, low back, legs     Congestive heart failure (H)      Degenerative disk disease      Fibromyalgia      Gastro-oesophageal reflux disease      Gout      Hiatal hernia      Mumps      Neuropathy      CRISTIANA  (obstructive sleep apnea) - CPAP      Palpitations      Pernicious anemia      Sleep apnea     uses CPAP.     Urinary incontinence      Vitamin D deficiency       PSHx: reviewed  Past Surgical History:   Procedure Laterality Date     APPENDECTOMY       CHOLECYSTECTOMY       COLONOSCOPY  3/15/2011     CORONARY ANGIOGRAPHY ADULT ORDER       CYSTOSCOPY, BIOPSY BLADDER, COMBINED N/A 7/13/2020    Procedure: CYSTOSCOPY, WITH BLADDER BIOPSY;  Surgeon: Deisy Wilson MD;  Location: [facility] OR     HEART CATH LEFT HEART CATH  12/30/16    medication management     HYSTERECTOMY TOTAL ABDOMINAL       Knee replacement NOS Left      LAPAROSCOPIC NISSEN FUNDOPLICATION N/A 2/4/2015    Procedure: LAPAROSCOPIC NISSEN FUNDOPLICATION;  Surgeon: Armando Ansari MD;  Location: [facility] OR     TONSILLECTOMY       TRANSPOSITION ULNAR NERVE (ELBOW)          Meds: reviewed  Current Outpatient Medications   Medication Sig Dispense Refill     ACE/ARB/ARNI NOT PRESCRIBED (INTENTIONAL) Please choose reason not prescribed, below       alcohol swab prep pads Use to swab area of injection/chandrakant as directed. 100 each 3     B Complex-C (SUPER B COMPLEX PO) Take 1 tablet by mouth At Bedtime       balsalazide (COLAZAL) 750 MG capsule Take 2,250 mg by mouth 3 times daily       Biotin 5000 MCG TABS Take 5,000 mcg by mouth At Bedtime       blood glucose (NO BRAND SPECIFIED) lancets standard Use to test blood sugar 1 time daily 100 lancet 3     blood glucose (NO BRAND SPECIFIED) lancets standard Use to test blood sugar  as directed. 1 each 0     blood glucose calibration (NO BRAND SPECIFIED) solution Use to calibrate blood glucose monitor 1 Bottle 3     blood glucose monitoring (NO BRAND SPECIFIED) meter device kit Use to test blood sugar 1 times daily or as directed. 1 kit 1     blood glucose monitoring (NO BRAND SPECIFIED) meter device kit Use to test blood sugar 1 time daily 1 kit 0     butalbital-aspirin-caffeine (FIORINAL) -40 MG capsule Take 1  capsule by mouth every 6 hours as needed for headaches 30 capsule 0     cholecalciferol (VITAMIN D3) 5000 units (125 mcg) capsule Take 5,000 Units by mouth At Bedtime       EPINEPHrine (EPIPEN 2-ROBBY) 0.3 MG/0.3ML injection 2-pack Inject 0.3 mLs (0.3 mg) into the muscle once as needed for anaphylaxis 1 each 1     estradiol (ESTRACE) 0.1 MG/GM vaginal cream Please use your finger to place a large blueberry size amount in the vagina nightly for two weeks and then three times a week at night thereafter 42.5 g 3     fosfomycin (MONUROL) 3 g Packet Take 3 g by mouth once       glipiZIDE (GLUCOTROL XL) 2.5 MG 24 hr tablet Take 1 tablet (2.5 mg) by mouth 2 times daily 180 tablet 0     meclizine (ANTIVERT) 12.5 MG tablet TAKE 1 TABLET BY MOUTH THREE TIMES DAILY AS NEEDED FOR DIZZINESS 30 tablet 0     nystatin (MYCOSTATIN) 179328 UNIT/GM external powder Apply to bilateral groin area 2x daily as needed. 30 g 1     ondansetron (ZOFRAN) 8 MG tablet Take 1 tablet (8 mg) by mouth every 8 hours as needed for nausea 30 tablet 0     ONETOUCH ULTRA test strip USE 1 STRIP TO CHECK GLUCOSE ONCE DAILY 100 strip 11     order for DME Oxygen 2 Li/min  at night bleed with CPAP       oxybutynin (DITROPAN) 5 MG tablet TAKE 1 TABLET TWICE A DAY 90 tablet 4     warfarin ANTICOAGULANT (COUMADIN) 2.5 MG tablet Take 1-1/2 tablets (3.75 mg) by mouth every Wednesday; and take 1 tablet (2.5 mg) all other days of the week or as directed by your [facility] Clinic Team. 126 tablet 1       Soc Hx: reviewed  Fam Hx: reviewed          Patti Lauren MD  Internal Medicine

## 2022-01-19 ENCOUNTER — PATIENT OUTREACH (OUTPATIENT)
Dept: NURSING | Facility: CLINIC | Age: 82
End: 2022-01-19
Payer: COMMERCIAL

## 2022-01-19 ENCOUNTER — ANTICOAGULATION THERAPY VISIT (OUTPATIENT)
Dept: INTERNAL MEDICINE | Facility: CLINIC | Age: 82
End: 2022-01-19

## 2022-01-19 DIAGNOSIS — K52.9 CHRONIC DIARRHEA: ICD-10-CM

## 2022-01-19 DIAGNOSIS — Z79.01 LONG TERM (CURRENT) USE OF ANTICOAGULANTS: ICD-10-CM

## 2022-01-19 DIAGNOSIS — I48.20 CHRONIC ATRIAL FIBRILLATION (H): ICD-10-CM

## 2022-01-19 DIAGNOSIS — I48.21 PERMANENT ATRIAL FIBRILLATION (H): Primary | ICD-10-CM

## 2022-01-19 LAB — INR (EXTERNAL): 1.4 (ref 0.9–1.1)

## 2022-01-19 NOTE — PROGRESS NOTES
Anticoagulation Management    Unable to reach Evelin HORTON today.    Today's INR result of 1.4 is subtherapeutic (goal INR of 2.0-3.0).  Result received from: Home Care    Follow up required to discuss out of range INR     [facility]      Anticoagulation clinic to follow up    Sanjana Fox RN

## 2022-01-19 NOTE — PROGRESS NOTES
"Clinic Care Coordination Contact  Community Health Worker Initial Outreach    CHW Initial Information Gathering:  Referral Source: PCP  Current living arrangement:: I live alone  Type of residence:: Independent Senior Living  Community Resources: [facility] Worker  Supplies used at home:: Incontinence Supplies (Briefs and bed pans)  Equipment Currently Used at Home: walker, standard  Informal Support system:: Children,Family  No PCP office visit in Past Year: No       Patient accepts CC: Yes. Patient scheduled for assessment with CC RN on 1-21-22 at 11:00 AM. Patient noted desire to discuss Care Coordination.     1-19, CHW:    CHW introduced self and Care Coordination.     Patient uses [employer], however yesterday she had an \"update\" on the quang (has quang on smartphone) and the money loaded onto her account was gone.    CHW provided lyft  service phone number for the patient.    Patient states that 225 dollars was on her account (200 dollars from the [facility] and 25 of her own money).    Patient is working with a  from the [facility]. CHW suggested contacting them, however patient reported that they are busy. Patient is interested in learning about other transportation resources.     Patient has a walker that would need to be accommodated by a transportation service.    Patient has meals and a  that are both paid for through the [facility].    CHW inquired if they had any other questions or concerns at this time, however they did not.    KEHINDE Barillas. Public Health  Community Health Worker  [facility]  Clinic Care Coordination  [phone]    "

## 2022-01-19 NOTE — PROGRESS NOTES
ANTICOAGULATION MANAGEMENT     Savanna Rehman 81 year old female is on warfarin with subtherapeutic INR result. (Goal INR 2.0-3.0)    Recent labs: (last 7 days)     01/19/22  1156   INR 1.4*       ASSESSMENT     Source(s): Chart Review and Home Care/Facility Nurse       Warfarin doses taken: Warfarin taken as instructed    Diet: No new diet changes identified    New illness, injury, or hospitalization: No    Medication/supplement changes: None noted    Signs or symptoms of bleeding or clotting: No    Previous INR: Therapeutic last 2(+) visits    Additional findings: None     PLAN     Recommended plan for no diet, medication or health factor changes affecting INR     Dosing Instructions: Booster dose then continue your current warfarin dose with next INR in 5 days       Summary  As of 1/19/2022    Full warfarin instructions:  1/19: 5 mg; Otherwise 2.5 mg every day   Next INR check:  1/25/2022             Telephone call with home care nurse berny who verbalizes understanding and agrees to plan    Orders given to  Homecare nurse/facility to recheck    Education provided: Contact [phone]  with any changes, questions or concerns.     Plan made per [facility] anticoagulation protocol    Sanjana Fox RN  Anticoagulation Clinic  1/19/2022    _______________________________________________________________________     Anticoagulation Episode Summary     Current INR goal:  2.0-3.0   TTR:  71.8 % (1 y)   Target end date:  Indefinite   Send INR reminders to:  NIKKI CHASE    Indications    Permanent atrial fibrillation (H) [I48.21]  Chronic atrial fibrillation (H) [I48.20]  Long term (current) use of anticoagulants [Z79.01]           Comments:  Home care          Anticoagulation Care Providers     Provider Role Specialty Phone number    Patti Suárez MD Referring Internal Medicine [phone]          Sanjana Green RN, BSN, PHN  Anticoagulation Nurse  [phone]

## 2022-01-20 LAB — C DIFF TOX B STL QL: POSITIVE

## 2022-01-20 PROCEDURE — 87493 C DIFF AMPLIFIED PROBE: CPT

## 2022-01-21 ENCOUNTER — PATIENT OUTREACH (OUTPATIENT)
Dept: NURSING | Facility: CLINIC | Age: 82
End: 2022-01-21
Payer: COMMERCIAL

## 2022-01-21 ENCOUNTER — TELEPHONE (OUTPATIENT)
Dept: INTERNAL MEDICINE | Facility: CLINIC | Age: 82
End: 2022-01-21

## 2022-01-21 PROCEDURE — 87506 IADNA-DNA/RNA PROBE TQ 6-11: CPT

## 2022-01-21 NOTE — PROGRESS NOTES
Clinic Care Coordination Contact  Care Team Conversations    1-21, CHW:     The CHW received a VM from the patient regarding difficulty getting through to the [employer] service lift line. CHW returned call and explained CHW role.     CHW also called to reschedule the CC RN assessment with the patient. The patient stated that they did not have their calendar so could not reschedule at this time.    The CHW requested a return call to reschedule the RN assessment Patient agreed.     KEHINDE Barillas. [facility]  Community Health Worker  [facility] Care Coordination  [phone]

## 2022-01-22 ENCOUNTER — TELEPHONE (OUTPATIENT)
Dept: INTERNAL MEDICINE | Facility: CLINIC | Age: 82
End: 2022-01-22
Payer: COMMERCIAL

## 2022-01-22 ENCOUNTER — NURSE TRIAGE (OUTPATIENT)
Dept: NURSING | Facility: CLINIC | Age: 82
End: 2022-01-22
Payer: COMMERCIAL

## 2022-01-22 DIAGNOSIS — A04.72 C. DIFFICILE COLITIS: Primary | ICD-10-CM

## 2022-01-22 DIAGNOSIS — A49.8 RECURRENT CLOSTRIDIOIDES DIFFICILE INFECTION: Primary | ICD-10-CM

## 2022-01-22 NOTE — TELEPHONE ENCOUNTER
Please call the patient.  The diarrhea is secondary C. Difficile  I sent prescription for Fidaxomicin 1 tablet twice a day for 10 days  She needs to keep her appointment February 2

## 2022-01-22 NOTE — TELEPHONE ENCOUNTER
Patient calling - says she has been dealing with C-diff for the months.  Says she has been on two different courses of antibiotics.  Finished last course about a month ago.  She had a telephone visit 1/18/2022 with Dr. Suárez and stool cultures were ordered.  Today Dr. Suárez ordered a medication for her: fidaxomicin (DIFICID) 200 MG tablet.  The pharmacy informed her it will cost $7,000.  She says she does not have $7,000.      Preferred pharmacy: [facility].    6:15 pm call placed to [employer] page operators (Hina) to assist in reaching on-call provider, Dr. Stan Warren.  Per Dr. Warren - send message to PCP for Monday advising of cost of medication.  PCP can decide next step for patient.    6:23 pm called patient back and relayed Dr. Warren's recommendation.      Message sent to PCP care team to address Monday morning per on-call provider.    Tammy Hilliard RN  Triage Nurse Advisor    COVID 19 Nurse Triage Plan/Patient Instructions    Please be aware that novel coronavirus (COVID-19) may be circulating in the community. If you develop symptoms such as fever, cough, or SOB or if you have concerns about the presence of another infection including coronavirus (COVID-19), please contact your health care provider or visit [email or web address].     Disposition/Instructions    Home care recommended. Follow home care protocol based instructions.    Thank you for taking steps to prevent the spread of this virus.  o Limit your contact with others.  o Wear a simple mask to cover your cough.  o Wash your hands well and often.    Resources    [facility]: About COVID-19: [email or web address]    CDC: What to Do If You're Sick: www.cdc.gov/coronavirus/2019-ncov/about/steps-when-sick.html    CDC: Ending Home Isolation: www.cdc.gov/coronavirus/2019-ncov/hcp/disposition-in-home-patients.html     CDC: Caring for Someone:  www.cdc.gov/coronavirus/2019-ncov/if-you-are-sick/care-for-someone.html     [facility]: Interim Guidance for Hospital Discharge to Home: www.health.[facility]/diseases/coronavirus/hcp/hospdischarge.pdf    [facility] clinical trials (COVID-19 research studies): [email or web address].[email or web address]-clinical-trials     Below are the COVID-19 hotlines at the [place] Department of Health ([facility]). Interpreters are available.   o For health questions: Call 700-719-4868 or 1-726.309.2639 (7 a.m. to 7 p.m.)  o For questions about schools and childcare: Call 060-686-3010 or 1-444.752.6481 (7 a.m. to 7 p.m.)     Reason for Disposition    [1] Caller has URGENT medication question about med that PCP or specialist prescribed AND [2] triager unable to answer question    Protocols used: MEDICATION QUESTION CALL-A-

## 2022-01-23 ENCOUNTER — NURSE TRIAGE (OUTPATIENT)
Dept: NURSING | Facility: CLINIC | Age: 82
End: 2022-01-23
Payer: COMMERCIAL

## 2022-01-23 NOTE — TELEPHONE ENCOUNTER
"Telephone Call:     Pt calling because she is upset that the on call provider deferred a c-diff treatment to her PCP to handle tomorrow, 1/24/2022.     Pt reports she has been dealing with C-diff for \"months\" and the medication that her PCP prescribed her yesterday costs $7000 for a 10 day supply    fidaxomicin (DIFICID) 200 MG tablet.    Pt states that she has been on Vancomycin previously.     1:15PM called and spoke to Dr. Soto who ordered the following:   Vancomycin 125mg; take 1 capsule by rxtwx2d a day for 14 days, then  Then TID x 14 days, then BID x 14 days, then daily for 14 days    Writer spoke with the pharmacy and they will fill this for patient.    Writer spoke with patient and she will coordinate getting this medication.     Writer will route a message to patient's PCP as an DEACON Townsend RN  [facility] Nurse Advisor 11:44 AM 1/23/2022    Reason for Disposition    [1] Caller has URGENT medication question about med that PCP or specialist prescribed AND [2] triager unable to answer question    Additional Information    Negative: Drug overdose and triager unable to answer question    Negative: Caller requesting information unrelated to medicine    Negative: Caller requesting a prescription for Strep throat and has a positive culture result    Negative: Rash while taking a medication or within 3 days of stopping it    Negative: Immunization reaction suspected    Negative: [1] Asthma and [2] having symptoms of asthma (cough, wheezing, etc.)    Negative: [1] Influenza symptoms AND [2] anti-viral med prescription request, such as Tamiflu    Negative: [1] Symptom of illness (e.g., headache, abdominal pain, earache, vomiting) AND [2] more than mild    Negative: MORE THAN A DOUBLE DOSE of a prescription or over-the-counter (OTC) drug    Negative: [1] DOUBLE DOSE (an extra dose or lesser amount) of over-the-counter (OTC) drug AND [2] any symptoms (e.g., dizziness, nausea, pain, sleepiness)    " "Negative: [1] DOUBLE DOSE (an extra dose or lesser amount) of prescription drug AND [2] any symptoms (e.g., dizziness, nausea, pain, sleepiness)    Negative: Took another person's prescription drug    Negative: [1] Pharmacy calling with prescription questions AND [2] triager unable to answer question    Negative: [1] Prescription not at pharmacy AND [2] was prescribed by PCP recently    Negative: [1] Request for URGENT new prescription or refill of \"essential\" medication (i.e., likelihood of harm to patient if not taken) AND [2] triager unable to fill per unit policy    Negative: Diabetes drug error or overdose (e.g., took wrong type of insulin or took extra dose)    Negative: [1] DOUBLE DOSE (an extra dose or lesser amount) of prescription drug AND [2] NO symptoms (Exception: a double dose of antibiotics)    Protocols used: MEDICATION QUESTION CALL-A-AH      "

## 2022-01-24 ENCOUNTER — PATIENT OUTREACH (OUTPATIENT)
Dept: NURSING | Facility: CLINIC | Age: 82
End: 2022-01-24
Payer: COMMERCIAL

## 2022-01-24 DIAGNOSIS — A49.8 RECURRENT CLOSTRIDIOIDES DIFFICILE INFECTION: ICD-10-CM

## 2022-01-24 DIAGNOSIS — Z53.9 DIAGNOSIS NOT YET DEFINED: Primary | ICD-10-CM

## 2022-01-24 PROCEDURE — G0179 MD RECERTIFICATION HHA PT: HCPCS | Performed by: INTERNAL MEDICINE

## 2022-01-24 RX ORDER — VANCOMYCIN HYDROCHLORIDE 125 MG/1
CAPSULE ORAL
Qty: 100 CAPSULE | Refills: 0 | Status: SHIPPED | OUTPATIENT
Start: 2022-01-24 | End: 2022-06-02

## 2022-01-24 NOTE — TELEPHONE ENCOUNTER
Patient called insurance speaking with nurse and pharmacist.  Of the 4 available meds for C. Diff. treatment she has tried 2 (Vancomycin, Metronidazole)  A third Fidaxomicin is cost prohibitive and that leaves Xifaxan.  A coverage review has already been started on Xifaxan and if MD approves of using this med then please order med.  Patient would pay $200 which is what she has left to pay out of her deductible.    Patient already knows her pharmacy [facility] would have to order the Xifaxan and it would take up to 3 days to come in.  Patient will call around to see if any pharmacies have it in stock.  BENITA Graham R.N.

## 2022-01-24 NOTE — PROGRESS NOTES
Clinic Care Coordination Contact  Care Team Conversations  Appointment reschedule.    Reason for referral: Complex medical concerns/education, chronic diagnosis.  Additional information: she needs help with transportation to appointments.    1-24, CHW:    CHW reached out and was able to connect with the patient.    The patient is scheduled to meet with the CC RN on the CC team on 1-27-22 @ 10 AM to discuss transportation resources.    Patient stated that they contacted [facility] about Health Ride and was told it is not included in her insurance plan.     The CHW inquired if the patient had any other questions or concerns at this time, however the patient declined and stated this was their number one concern at this time.    KEHINDE Barillas. Public Health  Community Health Worker  [facility]  Clinic Care Coordination  [phone]

## 2022-01-24 NOTE — TELEPHONE ENCOUNTER
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  I don't expect anybody to pay 7000 $ for a treatment.  Bu the patient should have checked with the insurance on this.   Patient needs to call insurance to see what she needs to have this med approved and if not approved she can take Vancomycin instead     May try PA

## 2022-01-24 NOTE — TELEPHONE ENCOUNTER
Patient calling. She cant afford treatment and wants to know what she can do. Please advise. Ok to call and ishmael [phone]

## 2022-01-24 NOTE — TELEPHONE ENCOUNTER
Patient called back and said we should prescribe xifaxan and sent it to her [facility] pharmacy.

## 2022-01-24 NOTE — TELEPHONE ENCOUNTER
Patient will call her insurance to see what it would take for them to cover the Fidaxomicin since she has tried and failed the Vancomycin.  She will call back with her findings.      Of note, Good Rx card reduces Vancomycin to $111 for the first 14 days.  BENITA Graham R.N.

## 2022-01-24 NOTE — TELEPHONE ENCOUNTER
Pt is calling again about vanco (adv it was at [facility]), wondering about xifaxan? Wondering about rash on legs. Ok to take anti histamine while taking vanco?     Should she be on Flagyl?     Sowmya HERNANDEZ RN

## 2022-01-25 ENCOUNTER — ANTICOAGULATION THERAPY VISIT (OUTPATIENT)
Dept: ANTICOAGULATION | Facility: CLINIC | Age: 82
End: 2022-01-25

## 2022-01-25 ENCOUNTER — VIRTUAL VISIT (OUTPATIENT)
Dept: INTERNAL MEDICINE | Facility: CLINIC | Age: 82
End: 2022-01-25
Payer: COMMERCIAL

## 2022-01-25 DIAGNOSIS — I48.20 CHRONIC ATRIAL FIBRILLATION (H): ICD-10-CM

## 2022-01-25 DIAGNOSIS — Z79.01 LONG TERM (CURRENT) USE OF ANTICOAGULANTS: ICD-10-CM

## 2022-01-25 DIAGNOSIS — I48.21 PERMANENT ATRIAL FIBRILLATION (H): Primary | ICD-10-CM

## 2022-01-25 DIAGNOSIS — A49.8 RECURRENT CLOSTRIDIOIDES DIFFICILE INFECTION: Primary | ICD-10-CM

## 2022-01-25 LAB — INR (EXTERNAL): 1.5 (ref 0.9–1.1)

## 2022-01-25 PROCEDURE — 99443 PR PHYSICIAN TELEPHONE EVALUATION 21-30 MIN: CPT | Mod: 95 | Performed by: INTERNAL MEDICINE

## 2022-01-25 NOTE — PROGRESS NOTES
Savanna is a 81 year old who is being evaluated via a billable telephone visit.      What phone number would you like to be contacted at? [phone]  How would you like to obtain your AVS? Mail a copy      This is a telephone encounter with the patient.       08:00 --- 08:23          Dr Lauren's note      Patient's instructions / PLAN:                                                        ASSESSMENT & PLAN:                                                      (A49.8) Recurrent Clostridioides difficile infection  (primary encounter diagnosis)  Comment:   Plan: Vancomycin 125 mg orally 4 times daily for 14 days, then 125 mg orally 2 times daily for 7 days, then 125 mg orally once daily for 7 days, then 125 mg orally every 2 days for 4 weeks, then 125 mg orally every  3 days for 4 weeks  Follow up on Feb 2        Chief complaint:                                                      C Diff treatment     SUBJECTIVE:                                                    History of present illness:    There are so many messages in the computer about the tx and we need to clarify it.  She can't afford theFidaxomicin ( $7000) nor the Rifaximin ( 1100 $).  Vanco is expensive too but she will use good Rx and be ablre to buy it. A family member will buy it today and bring it to her.   She still has diarrhea and feels weak.  I encouraged her to stay hydrated.            Review of Systems:                                                      ROS: negative for fever, chills, cough, wheezes, chest pain, shortness of breath, vomiting, abdominal pain, leg swelling Pos for diarrhea and gen weakness       OBJECTIVE:           An actual physical exam can't be done during phone visit         PMHx: reviewed  Past Medical History:   Diagnosis Date     Anemia     Iron Deficiency anemia     Atrial fibrillation (H)      CAD (coronary artery disease)     non-obstructive     Chronic pain     neck, low back, legs     Congestive heart failure (H)       Degenerative disk disease      Fibromyalgia      Gastro-oesophageal reflux disease      Gout      Hiatal hernia      Mumps      Neuropathy      CRISTIANA (obstructive sleep apnea) - CPAP      Palpitations      Pernicious anemia      Sleep apnea     uses CPAP.     Urinary incontinence      Vitamin D deficiency       PSHx: reviewed  Past Surgical History:   Procedure Laterality Date     APPENDECTOMY       CHOLECYSTECTOMY       COLONOSCOPY  3/15/2011     CORONARY ANGIOGRAPHY ADULT ORDER       CYSTOSCOPY, BIOPSY BLADDER, COMBINED N/A 7/13/2020    Procedure: CYSTOSCOPY, WITH BLADDER BIOPSY;  Surgeon: Deisy Wilson MD;  Location: [facility] OR     HEART CATH LEFT HEART CATH  12/30/16    medication management     HYSTERECTOMY TOTAL ABDOMINAL       Knee replacement NOS Left      LAPAROSCOPIC NISSEN FUNDOPLICATION N/A 2/4/2015    Procedure: LAPAROSCOPIC NISSEN FUNDOPLICATION;  Surgeon: Armando Ansari MD;  Location: [facility] OR     TONSILLECTOMY       TRANSPOSITION ULNAR NERVE (ELBOW)          Meds: reviewed  Current Outpatient Medications   Medication Sig Dispense Refill     ACE/ARB/ARNI NOT PRESCRIBED (INTENTIONAL) Please choose reason not prescribed, below       alcohol swab prep pads Use to swab area of injection/chandrakant as directed. 100 each 3     B Complex-C (SUPER B COMPLEX PO) Take 1 tablet by mouth At Bedtime       balsalazide (COLAZAL) 750 MG capsule Take 2,250 mg by mouth 3 times daily       Biotin 5000 MCG TABS Take 5,000 mcg by mouth At Bedtime       blood glucose (NO BRAND SPECIFIED) lancets standard Use to test blood sugar 1 time daily 100 lancet 3     blood glucose (NO BRAND SPECIFIED) lancets standard Use to test blood sugar  as directed. 1 each 0     blood glucose calibration (NO BRAND SPECIFIED) solution Use to calibrate blood glucose monitor 1 Bottle 3     blood glucose monitoring (NO BRAND SPECIFIED) meter device kit Use to test blood sugar 1 times daily or as directed. 1 kit 1     blood glucose monitoring (NO  BRAND SPECIFIED) meter device kit Use to test blood sugar 1 time daily 1 kit 0     butalbital-aspirin-caffeine (FIORINAL) -40 MG capsule Take 1 capsule by mouth every 6 hours as needed for headaches 30 capsule 0     cholecalciferol (VITAMIN D3) 5000 units (125 mcg) capsule Take 5,000 Units by mouth At Bedtime       EPINEPHrine (EPIPEN 2-ROBBY) 0.3 MG/0.3ML injection 2-pack Inject 0.3 mLs (0.3 mg) into the muscle once as needed for anaphylaxis 1 each 1     estradiol (ESTRACE) 0.1 MG/GM vaginal cream Please use your finger to place a large blueberry size amount in the vagina nightly for two weeks and then three times a week at night thereafter 42.5 g 3     fidaxomicin (DIFICID) 200 MG tablet Take 1 tablet (200 mg) by mouth 2 times daily 20 tablet 0     fosfomycin (MONUROL) 3 g Packet Take 3 g by mouth once       glipiZIDE (GLUCOTROL XL) 2.5 MG 24 hr tablet Take 1 tablet (2.5 mg) by mouth 2 times daily 180 tablet 0     meclizine (ANTIVERT) 12.5 MG tablet TAKE 1 TABLET BY MOUTH THREE TIMES DAILY AS NEEDED FOR DIZZINESS 30 tablet 0     nystatin (MYCOSTATIN) 357737 UNIT/GM external powder Apply to bilateral groin area 2x daily as needed. 30 g 1     ondansetron (ZOFRAN) 8 MG tablet Take 1 tablet (8 mg) by mouth every 8 hours as needed for nausea 30 tablet 0     ONETOUCH ULTRA test strip USE 1 STRIP TO CHECK GLUCOSE ONCE DAILY 100 strip 11     order for DME Oxygen 2 Li/min  at night bleed with CPAP       oxybutynin (DITROPAN) 5 MG tablet TAKE 1 TABLET TWICE A DAY 90 tablet 4     vancomycin (VANCOCIN) 125 MG capsule Vancomycin 125 mg orally 4 times daily for 14 days, then 125 mg orally 2 times daily for 7 days, then 125 mg orally once daily for 7 days, then 125 mg orally every 2 days for 4 weeks, then 125 mg orally every  3 days for 4 weeks 100 capsule 0     warfarin ANTICOAGULANT (COUMADIN) 2.5 MG tablet Take 1-1/2 tablets (3.75 mg) by mouth every Wednesday; and take 1 tablet (2.5 mg) all other days of the week or as  directed by your [facility] Clinic Team. 126 tablet 1       Soc Hx: reviewed  Fam Hx: reviewed          Patti Lauren MD  Internal Medicine

## 2022-01-25 NOTE — PROGRESS NOTES
ANTICOAGULATION MANAGEMENT     Savanna Rehman 81 year old female is on warfarin with subtherapeutic INR result. (Goal INR 2.0-3.0)    Recent labs: (last 7 days)     01/25/22  1250   INR 1.5*       ASSESSMENT     Source(s): Chart Review and Home Care/Facility Nurse       Warfarin doses taken: Warfarin taken as instructed    Diet: poor appetite (rice, ramen noodles and mashed potatoes) may be affecting diet and INR    New illness, injury, or hospitalization: Yes: C-diff positive    Medication/supplement changes: Vanco 3 months No interaction anticipated    Signs or symptoms of bleeding or clotting: No    Previous INR: Subtherapeutic    Additional findings: Patient will be speaking with PCP every two weeks regarding vacno treatments     PLAN     Recommended plan for ongoing change(s) affecting INR     Dosing Instructions: Booster dose then Increase your warfarin dose (15.4% change) with next INR in 1 week       Summary  As of 1/25/2022    Full warfarin instructions:  1/25: 5 mg; Otherwise 3.75 mg every Fri; 2.5 mg all other days   Next INR check:  2/1/2022             Telephone call with home care nurse Rosa who agrees to plan and repeated back plan correctly    Orders given to  Homecare nurse/facility to recheck    Education provided: Goal range and significance of current result, Importance of therapeutic range, Importance of following up at instructed interval, Importance of taking warfarin as instructed and Importance of notifying clinic for diarrhea, nausea/vomiting, reduced intake, and/or illness; a sooner lab recheck maybe needed.    Plan made per [facility] anticoagulation protocol    Roya Sky, RN  Anticoagulation Clinic  1/25/2022    _______________________________________________________________________     Anticoagulation Episode Summary     Current INR goal:  2.0-3.0   TTR:  71.8 % (1 y)   Target end date:  Indefinite   Send INR reminders to:  NIKKI CHASE    Indications    Permanent atrial  fibrillation (H) [I48.21]  Chronic atrial fibrillation (H) [I48.20]  Long term (current) use of anticoagulants [Z79.01]           Comments:  Home care          Anticoagulation Care Providers     Provider Role Specialty Phone number    Patti Suárez MD Referring Internal Medicine [phone]

## 2022-01-25 NOTE — TELEPHONE ENCOUNTER
Patient is scheduled this morning for a phone appointment with Dr. Lauren to discuss further. BENITA Graham R.N.

## 2022-01-27 ENCOUNTER — TELEPHONE (OUTPATIENT)
Dept: INTERNAL MEDICINE | Facility: CLINIC | Age: 82
End: 2022-01-27

## 2022-01-27 ENCOUNTER — PATIENT OUTREACH (OUTPATIENT)
Dept: NURSING | Facility: CLINIC | Age: 82
End: 2022-01-27
Payer: COMMERCIAL

## 2022-01-27 DIAGNOSIS — K52.9 CHRONIC DIARRHEA: ICD-10-CM

## 2022-01-27 SDOH — ECONOMIC STABILITY: TRANSPORTATION INSECURITY
IN THE PAST 12 MONTHS, HAS THE LACK OF TRANSPORTATION KEPT YOU FROM MEDICAL APPOINTMENTS OR FROM GETTING MEDICATIONS?: YES

## 2022-01-27 SDOH — ECONOMIC STABILITY: FOOD INSECURITY: WITHIN THE PAST 12 MONTHS, YOU WORRIED THAT YOUR FOOD WOULD RUN OUT BEFORE YOU GOT MONEY TO BUY MORE.: NEVER TRUE

## 2022-01-27 SDOH — ECONOMIC STABILITY: FOOD INSECURITY: WITHIN THE PAST 12 MONTHS, THE FOOD YOU BOUGHT JUST DIDN'T LAST AND YOU DIDN'T HAVE MONEY TO GET MORE.: NEVER TRUE

## 2022-01-27 SDOH — ECONOMIC STABILITY: INCOME INSECURITY: IN THE LAST 12 MONTHS, WAS THERE A TIME WHEN YOU WERE NOT ABLE TO PAY THE MORTGAGE OR RENT ON TIME?: NO

## 2022-01-27 SDOH — ECONOMIC STABILITY: TRANSPORTATION INSECURITY
IN THE PAST 12 MONTHS, HAS LACK OF TRANSPORTATION KEPT YOU FROM MEETINGS, WORK, OR FROM GETTING THINGS NEEDED FOR DAILY LIVING?: YES

## 2022-01-27 ASSESSMENT — SOCIAL DETERMINANTS OF HEALTH (SDOH)
WITHIN THE LAST YEAR, HAVE YOU BEEN AFRAID OF YOUR PARTNER OR EX-PARTNER?: NO
WITHIN THE LAST YEAR, HAVE YOU BEEN HUMILIATED OR EMOTIONALLY ABUSED IN OTHER WAYS BY YOUR PARTNER OR EX-PARTNER?: NO
WITHIN THE LAST YEAR, HAVE YOU BEEN KICKED, HIT, SLAPPED, OR OTHERWISE PHYSICALLY HURT BY YOUR PARTNER OR EX-PARTNER?: NO
WITHIN THE LAST YEAR, HAVE TO BEEN RAPED OR FORCED TO HAVE ANY KIND OF SEXUAL ACTIVITY BY YOUR PARTNER OR EX-PARTNER?: NO
HOW HARD IS IT FOR YOU TO PAY FOR THE VERY BASICS LIKE FOOD, HOUSING, MEDICAL CARE, AND HEATING?: SOMEWHAT HARD

## 2022-01-27 ASSESSMENT — LIFESTYLE VARIABLES
HOW OFTEN DO YOU HAVE SIX OR MORE DRINKS ON ONE OCCASION: NEVER
HOW OFTEN DO YOU HAVE A DRINK CONTAINING ALCOHOL: MONTHLY OR LESS
HOW MANY STANDARD DRINKS CONTAINING ALCOHOL DO YOU HAVE ON A TYPICAL DAY: 1 OR 2

## 2022-01-27 ASSESSMENT — ACTIVITIES OF DAILY LIVING (ADL): DEPENDENT_IADLS:: CLEANING

## 2022-01-27 NOTE — LETTER
[facility] CARE COORDINATION  [address]  [facility] [address]    January 27, 2022    Savanna Rehman  [address]    [facility] [address]      Dear Savanna,    I am a clinic care coordinator who works with Patti Suárez MD at [facility]. I wanted to thank you for spending the time to talk with me.  Below is a description of clinic care coordination and how I can further assist you.      The clinic care coordination team is made up of a registered nurse,  and community health worker who understand the health care system. The goal of clinic care coordination is to help you manage your health and improve access to the health care system in the most efficient manner. The team can assist you in meeting your health care goals by providing education, coordinating services, strengthening the communication among your providers and supporting you with any resource needs.    Please feel free to contact the Community Health Worker Rachael at [phone] with any questions or concerns. We are focused on providing you with the highest-quality healthcare experience possible and that all starts with you.     Sincerely,     Anjelica Berrios RN Care Coordinator  [facility]  Email: [email or web address]  Phone: [phone]           Transportation Resources:     Go Go Grandparent - [phone] must register online or by phone before first use   - Similar to Lyft, Uber. Do not need to use a smart phone   - Need a credit card, $2.70 fee per ride plus $1 per mile and $.34 per minute   - Can accommodate walkers, door to door   - Need to give 15 minutes notice   - Can have live texts sent to friend, family on status of ride     Transit Link- [phone] May only be used for routes outside of the normal metro transit lines   - Curb to curb transportation   - May reserve a ride up to five days in advance   - Base fare  is $2.25   - Any type of ride     Help at Your Door/Help for Seniors in the [facility] [phone] (former Store to Door).     Grocery shopping for seniors and individuals with disabilities/$5 - $15 fee depending on income.      services for seniors - $35/hr minimum 2 hrs.     Transportation for seniors - $35/trip up to 20 miles round trip - $1.90 each additional mile.  to start up in October. Will contact What's TrendingAP when transportation is available in our area.     [facility], [phone], [phone]; 24 hour automated departure times.     Everyone is eligible.     Fare based on age and time of day.     Taxi Services     [employer] - [phone]     [facility] Tax[facility], [phone]     [employer], [phone] $5 pick-up, $1.80/mile     [facility] Cab, [phone]     [employer] - [phone]   - Direct- Group rides $80 an hour, Select- individual rides $23.50 plus $1.75 per mile     Assisted Transport - [phone]     Serves [address] [place] [address].     Call for rates. Private pay. Some insurance plans accepted.     Non-emergency wheelchair and stretcher transportation.     Medical appointments.     [facility] [employer] - [phone]     Serves [address] [place] [address].     Accepts [employer] Choice, U-Care, BluePlus, Health Partners, [place], and private pay.     Medical appointment.     [facility], [facility], [employer], Health Partners, private pay insurance     To receive medical rides they must be on Medicaid/Medical Assistance. Medical rides only.     The Elderly Waiver (EW) is for adults ages 65 and over, who wish to live in their own home rather than in a nursing facility. If you are eligible, Elderly Waiver pays for services which support and care for you so that you can continue living in your home. These services are referred to as home and community-based services.     If they have an Elderly Waiver, EW, they are eligible for other rides that are required to keep them living  independently - grocery shopping etc.     For waiver services call [employer] [phone] if 65 or older or under 65 and ADA Certified.   [place] Non-Emergency Transportation - [phone] and have Medical assistance ID number ready     Must be on Medical Assistance and have [place] ID number.     Medical Appointment only.       Enclosed: I have enclosed a copy of the Patient Centered Plan of Care. This has helpful information and goals that we have talked about. Please keep this in an easy to access place to use as needed.

## 2022-01-27 NOTE — LETTER
[facility]  Patient Centered Plan of Care  About Me:        Patient Name:  Savanna Sanderson    YOB: 1940  Age:         81 year old   [place] MRN:    [id number] Telephone Information:  Home Phone [phone]   Mobile [phone]       Address:  [address]  [facility] [address] Email address:  [email or web address]      Emergency Contact(s)    Name Relationship Lgl Grd Work Phone Home Phone Mobile Phone   1. ELLIE JAIN Daughter   [phone]   2. HELEN SANDERSON Spouse   [phone]           Primary language:  English     needed? No   [place] [facility]:  [phone] op. 1  Other communication barriers:Glasses    Preferred Method of Communication:  Phone  Current living arrangement: I live alone    Mobility Status/ Medical Equipment: Independent w/Device        Health Maintenance  Health Maintenance Reviewed: Due/Overdue   Health Maintenance Due   Topic Date Due     HF ACTION PLAN  Never done     HEPATITIS C SCREENING  Never done     ZOSTER IMMUNIZATION (1 of 2) Never done     LUNG CANCER SCREENING  Never done     EYE EXAM  02/01/2018     MEDICARE ANNUAL WELLNESS VISIT  11/30/2021     DIABETIC FOOT EXAM  11/30/2021     FALL RISK ASSESSMENT  02/18/2022     LIPID  02/22/2022       My Access Plan  Medical Emergency 911   Primary Clinic Line [facility] - [phone]   24 Hour Appointment Line [phone] or  [phone]) (toll-free)   24 Hour Nurse Line [phone] (toll-free)   Preferred Urgent Care No data recorded   Preferred Hospital [facility]  [phone]     Preferred Pharmacy [id number] - [facility] [address] [place] [address]     Behavioral Health Crisis Line The National Suicide Prevention Lifeline at 1-797.298.4123 or 911       My Care Team Members  Patient Care Team       Relationship Specialty Notifications Start End     Patti Suárez MD PCP - General Internal Medicine  10/23/19     Phone: [phone] Pager: [phone] Fax: [phone]        [address] [facility] [address]    Patti Suráez MD Assigned PCP   9/27/19     Phone: [phone] Pager: [phone] Fax: [phone]        [address] [facility] [address]    Patti Suárez MD Referring Physician Internal Medicine  6/30/20     Phone: [phone] Pager: [phone] Fax: [phone]        [address] [facility] [address]    Deisy Wilson MD MD Urology  6/30/20     Phone: [phone] Fax: [phone]         [address] [place] [address]    Adelaida Liu, RN Specialty Care Coordinator Urology  6/30/20     Phone: [phone] Pager: [phone]        Deisy Wilson MD Assigned Surgical Provider   10/23/20     Phone: [phone] Fax: [phone]         [address] [place] [address]    Mohan Kenny MD Assigned Heart and Vascular Provider   4/25/21     Phone: [phone] Pager: [phone] Fax: [phone] [address] [facility] [address]    Nichol Berrios, RN Lead Care Coordinator  Admissions 1/27/22     Rachael Figueroa MA Community Health Worker   1/27/22             My Care Plans  Self Management and Treatment Plan  Goals and (Comments)  Goals        General     1. Transportation (pt-stated)      Notes - Note created  1/27/2022 11:02 AM by Nichol Berrios, RN     Goal Statement: I would like alternate transportation resources.   Date Goal set: 1/27/22  Barriers: Limited income  Strengths: Patient is motivated and engaged  Date to Achieve By: 7/1/22  Patient expressed understanding of goal: Yes  Action steps to achieve this goal:  1. I understand that RN CC will mail alternate transportation resources to me.   2. I will review resources and utilize as I see fit.   3. I will continue to work  with care coordination for any additional resources and support.                           Advance Care Plans/Directives Type:   No data recorded      Current Medications and Allergies:    Current Outpatient Medications   Medication Instructions     ACE/ARB/ARNI NOT PRESCRIBED (INTENTIONAL) Please choose reason not prescribed, below     alcohol swab prep pads Use to swab area of injection/chandrakant as directed.     B Complex-C (SUPER B COMPLEX PO) 1 tablet, Oral, AT BEDTIME     balsalazide (COLAZAL) 2,250 mg, Oral, 3 TIMES DAILY     Biotin 5,000 mcg, Oral, AT BEDTIME     blood glucose (NO BRAND SPECIFIED) lancets standard Use to test blood sugar 1 time daily     blood glucose (NO BRAND SPECIFIED) lancets standard Use to test blood sugar  as directed.     blood glucose calibration (NO BRAND SPECIFIED) solution Use to calibrate blood glucose monitor     blood glucose monitoring (NO BRAND SPECIFIED) meter device kit Use to test blood sugar 1 times daily or as directed.     blood glucose monitoring (NO BRAND SPECIFIED) meter device kit Use to test blood sugar 1 time daily     butalbital-aspirin-caffeine (FIORINAL) -40 MG capsule 1 capsule, Oral, EVERY 6 HOURS PRN     cholecalciferol (VITAMIN D3) 5,000 Units, Oral, AT BEDTIME     EPINEPHrine (EPIPEN 2-ROBBY) 0.3 mg, Intramuscular, ONCE PRN     estradiol (ESTRACE) 0.1 MG/GM vaginal cream Please use your finger to place a large blueberry size amount in the vagina nightly for two weeks and then three times a week at night thereafter     fidaxomicin (DIFICID) 200 mg, Oral, 2 TIMES DAILY     fosfomycin (MONUROL) 3 g, Oral, ONCE     glipiZIDE (GLUCOTROL XL) 2.5 mg, Oral, 2 TIMES DAILY     meclizine (ANTIVERT) 12.5 MG tablet TAKE 1 TABLET BY MOUTH THREE TIMES DAILY AS NEEDED FOR DIZZINESS     nystatin (MYCOSTATIN) 076378 UNIT/GM external powder Apply to bilateral groin area 2x daily as needed.     ondansetron (ZOFRAN) 8 mg, Oral, EVERY 8 HOURS PRN     ONETOUCH ULTRA test strip  USE 1 STRIP TO CHECK GLUCOSE ONCE DAILY     order for DME Oxygen 2 Li/min<BR>at night bleed with CPAP     oxybutynin (DITROPAN) 5 MG tablet TAKE 1 TABLET TWICE A DAY     vancomycin (VANCOCIN) 125 MG capsule Vancomycin 125 mg orally 4 times daily for 14 days, then 125 mg orally 2 times daily for 7 days, then 125 mg orally once daily for 7 days, then 125 mg orally every 2 days for 4 weeks, then 125 mg orally every  3 days for 4 weeks     warfarin ANTICOAGULANT (COUMADIN) 2.5 MG tablet Take 1-1/2 tablets (3.75 mg) by mouth every Wednesday; and take 1 tablet (2.5 mg) all other days of the week or as directed by your [facility] Clinic Team.         Care Coordination Start Date: 1/27/2022   Frequency of Care Coordination: monthly     Form Last Updated: 01/27/2022

## 2022-01-27 NOTE — COMMUNITY RESOURCES LIST (ENGLISH)
01/27/2022   [facility] - Connected Care   Nichol Berrios Phone: N/A   Email: [email or web address]   Address: [address] [place] [address]   Hours: N/A        Transportation       Free or low-cost transportation  1  [employer] [facility] [employer] Bus Distance: 12.85 miles      COVID-19 Status: Regular Operations   [address] [place] [address]  Language: English  Hours: Tue 9:00 AM - 2:00 PM  Fees: Self Pay   Phone: (729) 277-3392 Email: info@[employer] Website: http://www.[email or web address]/     2  [employer] Distance: 13.23 miles      COVID-19 Status: Pending Verification   [address] [place] [address]  Language: English, Stateless, Hmong, Yemeni, Mongolian  Hours: Mon - Sun 5:00 AM - 10:00 PM  Fees: Self Pay   Phone: [phone] Email: [email or web address]_mobility@[email or web address]     3  [facility] Distance: 14.72 miles      COVID-19 Status: Regular Operations   [address] [place] [address]  Language: English  Hours: Mon - Thu 9:00 AM - 3:00 PM  Fees: Free   Phone: (907) 533-4904 Ext.14 Email: [email or web address] Website: [email or web address]     4  [facility] Distance: 14.8 miles      COVID-19 Status: Regular Operations   [address] [place] [address]  Language: English  Hours: Mon - Fri 9:00 AM - 12:00 PM , Mon - Fri 1:00 PM - 3:00 PM  Fees: Self Pay   Phone: (323) [phone] Email: info@[employer].org Website: [email or web address]://[email or web address]/[email or web address]     Transportation to medical appointments  5  [employer] Distance: 8.99 miles      COVID-19 Status: Regular Operations   [address] [place] [address]  Language: English, Mongolian  Hours: Mon - Fri 7:00 AM - 6:00 PM  Fees: Self Pay   Phone: ([phone] Email: andrew@[employer] Website: http://www.[employer]/     6  [facility] Distance: 14.48  genaro      COVID-19 Status: Regular Operations   [address] [place] [address]  Language: English  Hours: Mon - Fri 8:00 AM - 4:00 PM  Fees: Free   Phone: (415) [phone] Email: [email or web address] Website: http://www.[email or web address]/          Important Numbers & Websites       Emergency Services   911  [facility]   311  Poison Control   (516) 614-7015  Suicide Prevention Lifeline   (758) 479-4345 (TALK)  Child Abuse Hotline   (661) 124-4644 (4-A-Child)  Sexual Assault Hotline   (542) 145-8516 (HOPE)  National Runaway Safeline   (970) 682-5724 (RUNAWAY)  All-Options Talkline   (732) 277-3071  Substance Abuse Referral   (435) 159-2982 (HELP)

## 2022-01-27 NOTE — PROGRESS NOTES
Clinic Care Coordination Contact    Clinic Care Coordination Contact  OUTREACH    Referral Information:  Referral Source: PCP    Primary Diagnosis: Psychosocial    Chief Complaint   Patient presents with     Clinic Care Coordination - Initial        Universal Utilization: Reviewed on January 27, 2022, no concerns noted  Clinic Utilization  Difficulty keeping appointments:: No  Compliance Concerns: No  No-Show Concerns: No  No PCP office visit in Past Year: No  Utilization    Hospital Admissions  0             ED Visits  0             No Show Count (past year)  4                Current as of: 1/27/2022  1:17 AM              Clinical Concerns:  Current Medical Concerns:    Patient Active Problem List   Diagnosis     Angioedema     Morbid obesity (H)     CRISTIANA (obstructive sleep apnea) - CPAP     Recurrent UTI     Urgency-frequency syndrome     Urgency incontinence     Candidiasis of skin     Chronic atrial fibrillation (H)     Permanent atrial fibrillation (H)     Long term (current) use of anticoagulants     Hyperlipidemia LDL goal <100     Vitamin D deficiency     Diabetes mellitus, type 2 (H)     Ascending aorta dilatation (H)     Nonrheumatic tricuspid valve regurgitation     Anemia     Gastroesophageal reflux disease     Gout     Chronic kidney disease, stage 3         Current Behavioral Concerns: None noted    Education Provided to patient: RN CC role and reason for call.       Health Maintenance Reviewed: Due/Overdue   Health Maintenance Due   Topic Date Due     HF ACTION PLAN  Never done     HEPATITIS C SCREENING  Never done     ZOSTER IMMUNIZATION (1 of 2) Never done     LUNG CANCER SCREENING  Never done     EYE EXAM  02/01/2018     MEDICARE ANNUAL WELLNESS VISIT  11/30/2021     DIABETIC FOOT EXAM  11/30/2021     FALL RISK ASSESSMENT  02/18/2022     LIPID  02/22/2022       Clinical Pathway: None    Medication Management:  Medication review status: Medications reviewed and no changes reported per patient.         Current Outpatient Medications   Medication Instructions     ACE/ARB/ARNI NOT PRESCRIBED (INTENTIONAL) Please choose reason not prescribed, below     alcohol swab prep pads Use to swab area of injection/chandrakant as directed.     B Complex-C (SUPER B COMPLEX PO) 1 tablet, Oral, AT BEDTIME     balsalazide (COLAZAL) 2,250 mg, Oral, 3 TIMES DAILY     Biotin 5,000 mcg, Oral, AT BEDTIME     blood glucose (NO BRAND SPECIFIED) lancets standard Use to test blood sugar 1 time daily     blood glucose (NO BRAND SPECIFIED) lancets standard Use to test blood sugar  as directed.     blood glucose calibration (NO BRAND SPECIFIED) solution Use to calibrate blood glucose monitor     blood glucose monitoring (NO BRAND SPECIFIED) meter device kit Use to test blood sugar 1 times daily or as directed.     blood glucose monitoring (NO BRAND SPECIFIED) meter device kit Use to test blood sugar 1 time daily     butalbital-aspirin-caffeine (FIORINAL) -40 MG capsule 1 capsule, Oral, EVERY 6 HOURS PRN     cholecalciferol (VITAMIN D3) 5,000 Units, Oral, AT BEDTIME     EPINEPHrine (EPIPEN 2-ROBBY) 0.3 mg, Intramuscular, ONCE PRN     estradiol (ESTRACE) 0.1 MG/GM vaginal cream Please use your finger to place a large blueberry size amount in the vagina nightly for two weeks and then three times a week at night thereafter     fidaxomicin (DIFICID) 200 mg, Oral, 2 TIMES DAILY     fosfomycin (MONUROL) 3 g, Oral, ONCE     glipiZIDE (GLUCOTROL XL) 2.5 mg, Oral, 2 TIMES DAILY     meclizine (ANTIVERT) 12.5 MG tablet TAKE 1 TABLET BY MOUTH THREE TIMES DAILY AS NEEDED FOR DIZZINESS     nystatin (MYCOSTATIN) 815893 UNIT/GM external powder Apply to bilateral groin area 2x daily as needed.     ondansetron (ZOFRAN) 8 mg, Oral, EVERY 8 HOURS PRN     ONETOUCH ULTRA test strip USE 1 STRIP TO CHECK GLUCOSE ONCE DAILY     order for DME Oxygen 2 Li/min<BR>at night bleed with CPAP     oxybutynin (DITROPAN) 5 MG tablet TAKE 1 TABLET TWICE A DAY     vancomycin  (VANCOCIN) 125 MG capsule Vancomycin 125 mg orally 4 times daily for 14 days, then 125 mg orally 2 times daily for 7 days, then 125 mg orally once daily for 7 days, then 125 mg orally every 2 days for 4 weeks, then 125 mg orally every  3 days for 4 weeks     warfarin ANTICOAGULANT (COUMADIN) 2.5 MG tablet Take 1-1/2 tablets (3.75 mg) by mouth every Wednesday; and take 1 tablet (2.5 mg) all other days of the week or as directed by your [facility] Clinic Team.          Functional Status:  Dependent ADLs:: Ambulation-walker  Dependent IADLs:: Cleaning  Bed or wheelchair confined:: No  Mobility Status: Independent w/Device  Fallen 2 or more times in the past year?: Yes  Any fall with injury in the past year?: No    Living Situation:  Current living arrangement:: I live alone  Type of residence:: Independent Senior Living    Lifestyle & Psychosocial Needs:    Social Determinants of Health     Tobacco Use: Medium Risk     Smoking Tobacco Use: Former Smoker     Smokeless Tobacco Use: Never Used   Alcohol Use: Not At Risk     Frequency of Alcohol Consumption: Monthly or less     Average Number of Drinks: 1 or 2     Frequency of Binge Drinking: Never   Financial Resource Strain: Medium Risk     Difficulty of Paying Living Expenses: Somewhat hard   Food Insecurity: No Food Insecurity     Worried About Running Out of Food in the Last Year: Never true     Ran Out of Food in the Last Year: Never true   Transportation Needs: Unmet Transportation Needs     Lack of Transportation (Medical): Yes     Lack of Transportation (Non-Medical): Yes   Physical Activity: Not on file   Stress: Stress Concern Present     Feeling of Stress : Very much   Social Connections: Not on file   Intimate Partner Violence: Not At Risk     Fear of Current or Ex-Partner: No     Emotionally Abused: No     Physically Abused: No     Sexually Abused: No   Depression: Not at risk     PHQ-2 Score: 1   Housing Stability: Low Risk      Unable to Pay for Housing in the  Last Year: No     Number of Places Lived in the Last Year: 1     Unstable Housing in the Last Year: No     Diet:: Diabetic diet  Inadequate nutrition (GOAL):: No  Tube Feeding: No  Inadequate activity/exercise (GOAL):: No  Significant changes in sleep pattern (GOAL): No  Transportation means:: [employer] mobility,Family     Congregational or spiritual beliefs that impact treatment:: No  Mental health DX:: No  Mental health management concern (GOAL):: No  Chemical Dependency Status: No Current Concerns  Informal Support system:: Children,Family     RN CC called and spoke with Savanna. Patient has a [facility]  and is currently enrolled in several [facility] assistance programs. Including free meals at her senior living facility, some grocery assistance, and a cleaning service. Patient does have [employer], but does not like the wait times to receive a ride.    Patient's greatest concerns is finding another transportation company that best fits her needs. Discussed [facility] transportation, that would be free for patient. Patient has used before and doesn't want to use. Reviewed the populated [employer] resources with patient. Patient is currently not interested in mental health resources. Patient requests a paper copy of [employer] and [facility] transportation resources through the mail.      Resources and Interventions:  Current Resources:      Community Resources: County Worker,Transportation Services,[facility] Programs,Housekeeping/Chore Agency  Supplies used at home:: Incontinence Supplies (Briefs and bed pans)  Equipment Currently Used at Home: walker, standard  Employment Status: retired         Advance Care Plan/Directive  Advanced Care Plans/Directives on file:: No  Advanced Care Plan/Directive Status: Considering Options    Referrals Placed: None     Goals:   Goals        General     1. Transportation (pt-stated)      Notes - Note created  1/27/2022 11:02 AM by Nichol Berrois RN     Goal Statement: I would like  alternate transportation resources.   Date Goal set: 1/27/22  Barriers: Limited income  Strengths: Patient is motivated and engaged  Date to Achieve By: 7/1/22  Patient expressed understanding of goal: Yes  Action steps to achieve this goal:  1. I understand that RN CC will mail alternate transportation resources to me.   2. I will review resources and utilize as I see fit.   3. I will continue to work with care coordination for any additional resources and support.               Patient/Caregiver understanding: Patient reports understanding and denies any additional questions or concerns at this times. RN CC engaged in AIDET communication during encounter.      Outreach Frequency: monthly  Future Appointments              In 6 days Patti Suárez MD [place], [place]    In 1 week Anjali Rabago MD [facility]          Plan: RN CC will mail patient care coordination introduction letter, complex care plan and above discussed resources. CHW will reach out to patient in one month and check on status of care plan goal. RN CC will review chart in 6 weeks.     Anjelica Berrios RN Care Coordinator  [facility]  Email: [email or web address]  Phone: [phone]

## 2022-01-28 ENCOUNTER — MEDICAL CORRESPONDENCE (OUTPATIENT)
Dept: HEALTH INFORMATION MANAGEMENT | Facility: CLINIC | Age: 82
End: 2022-01-28
Payer: COMMERCIAL

## 2022-01-30 ENCOUNTER — NURSE TRIAGE (OUTPATIENT)
Dept: NURSING | Facility: CLINIC | Age: 82
End: 2022-01-30
Payer: COMMERCIAL

## 2022-01-30 NOTE — TELEPHONE ENCOUNTER
Patient calling about an issue with her vancomycin medication.     Patient unable to sleep last night until 3 am. Patient called in at 9:30 am to her senior residence. Patient fell back asleep and woke up again at 2:30 pm to the phone. It was 3 pm before she got around to taking any medication. Patient now concerned about how to get the four doses of the vancomycin completed today. Patient taking vancomycin for recurrent C-diff infection.     Call to [facility] pharmacy and spoke with the pharmacist who is advising that she try to fit in the four doses today. 3 pm, 6 pm, 9 pm, and midnight.     Call to patient. Relayed recommendation from the pharmacist. Patient feels like since she slept so much today she will have no problem staying up and getting the four doses in this evening.   If there are any issues or if she is unable to get the four doses in she will call PCP in the morning to discuss.  Michelle Andino RN   01/30/22 3:52 PM  [facility] Nurse Advisor      Reason for Disposition    [1] Caller has URGENT medication question about med that PCP or specialist prescribed AND [2] triager unable to answer question    Additional Information    Negative: Drug overdose and triager unable to answer question    Negative: Caller requesting information unrelated to medicine    Negative: Caller requesting a prescription for Strep throat and has a positive culture result    Negative: Rash while taking a medication or within 3 days of stopping it    Negative: Immunization reaction suspected    Negative: [1] Asthma and [2] having symptoms of asthma (cough, wheezing, etc.)    Negative: [1] Influenza symptoms AND [2] anti-viral med prescription request, such as Tamiflu    Negative: [1] Symptom of illness (e.g., headache, abdominal pain, earache, vomiting) AND [2] more than mild    Negative: MORE THAN A DOUBLE DOSE of a prescription or over-the-counter (OTC) drug    Negative: [1] DOUBLE DOSE (an extra dose or lesser amount) of  "over-the-counter (OTC) drug AND [2] any symptoms (e.g., dizziness, nausea, pain, sleepiness)    Negative: [1] DOUBLE DOSE (an extra dose or lesser amount) of prescription drug AND [2] any symptoms (e.g., dizziness, nausea, pain, sleepiness)    Negative: Took another person's prescription drug    Negative: [1] DOUBLE DOSE (an extra dose or lesser amount) of prescription drug AND [2] NO symptoms (Exception: a double dose of antibiotics)    Negative: Diabetes drug error or overdose (e.g., took wrong type of insulin or took extra dose)    Negative: [1] Request for URGENT new prescription or refill of \"essential\" medication (i.e., likelihood of harm to patient if not taken) AND [2] triager unable to fill per unit policy    Negative: [1] Prescription not at pharmacy AND [2] was prescribed by PCP recently    Negative: [1] Pharmacy calling with prescription questions AND [2] triager unable to answer question    Protocols used: MEDICATION QUESTION CALL-A-AH      "

## 2022-02-01 ENCOUNTER — ANTICOAGULATION THERAPY VISIT (OUTPATIENT)
Dept: ANTICOAGULATION | Facility: CLINIC | Age: 82
End: 2022-02-01
Payer: COMMERCIAL

## 2022-02-01 DIAGNOSIS — Z79.01 LONG TERM (CURRENT) USE OF ANTICOAGULANTS: ICD-10-CM

## 2022-02-01 DIAGNOSIS — I48.21 PERMANENT ATRIAL FIBRILLATION (H): Primary | ICD-10-CM

## 2022-02-01 DIAGNOSIS — I48.20 CHRONIC ATRIAL FIBRILLATION (H): ICD-10-CM

## 2022-02-01 LAB — INR (EXTERNAL): 1.6 (ref 0.9–1.1)

## 2022-02-01 NOTE — PROGRESS NOTES
ANTICOAGULATION MANAGEMENT     Savanna Rehman 81 year old female is on warfarin with subtherapeutic INR result. (Goal INR 2.0-3.0)    Recent labs: (last 7 days)     02/01/22  1152   INR 1.6*       ASSESSMENT     Source(s): Chart Review and Home Care/Facility Nurse       Warfarin doses taken: Warfarin taken as instructed    Diet: Continues to have poor appeitite. Eating rice and soup mostly.    New illness, injury, or hospitalization: Continues to be treated for c.diff. Having diarrhea on and off throughout the week. Continues on oral vanco.    Medication/supplement changes: None noted    Signs or symptoms of bleeding or clotting: No    Previous INR: Subtherapeutic    Additional findings: None     PLAN     Recommended plan for no diet, medication or health factor changes affecting INR     Dosing Instructions: Booster dose then Increase your warfarin dose (13% change) with next INR in 1 week       Summary  As of 2/1/2022    Full warfarin instructions:  2/1: 6.25 mg; Otherwise 3.75 mg every Mon, Wed, Fri; 2.5 mg all other days   Next INR check:  2/8/2022             Telephone call with home care nurse Evelin who verbalizes understanding and agrees to plan    Orders given to  Homecare nurse/facility to recheck    Education provided: Goal range and significance of current result    Plan made per [facility] anticoagulation protocol    Miko West RN  Anticoagulation Clinic  2/1/2022    _______________________________________________________________________     Anticoagulation Episode Summary     Current INR goal:  2.0-3.0   TTR:  70.9 % (1 y)   Target end date:  Indefinite   Send INR reminders to:  NIKKI CHASE    Indications    Permanent atrial fibrillation (H) [I48.21]  Chronic atrial fibrillation (H) [I48.20]  Long term (current) use of anticoagulants [Z79.01]           Comments:  Home care          Anticoagulation Care Providers     Provider Role Specialty Phone number    Patti Suárez MD  Referring Internal Medicine [phone]

## 2022-02-01 NOTE — TELEPHONE ENCOUNTER
Provider signed [facility] form Faxed back @ [phone]    SALVATORE Triplett LPN

## 2022-02-02 ENCOUNTER — VIRTUAL VISIT (OUTPATIENT)
Dept: INTERNAL MEDICINE | Facility: CLINIC | Age: 82
End: 2022-02-02
Payer: COMMERCIAL

## 2022-02-02 VITALS — BODY MASS INDEX: 39.84 KG/M2 | RESPIRATION RATE: 18 BRPM | HEIGHT: 68 IN

## 2022-02-02 DIAGNOSIS — A49.8 RECURRENT CLOSTRIDIOIDES DIFFICILE INFECTION: Primary | ICD-10-CM

## 2022-02-02 PROCEDURE — 99442 PR PHYSICIAN TELEPHONE EVALUATION 11-20 MIN: CPT | Mod: 95 | Performed by: INTERNAL MEDICINE

## 2022-02-02 NOTE — PROGRESS NOTES
Savanna is a 81 year old who is being evaluated via a billable video visit.      How would you like to obtain your AVS? Mail a copy  If the video visit is dropped, the invitation should be resent by: Text to cell phone: [phone]  Will anyone else be joining your video visit? No        This is a telephone encounter with the patient.       12:32 --- 12:458          Dr Lauren's note          ASSESSMENT & PLAN:                                                      (A49.8) Recurrent Clostridioides difficile infection  (primary encounter diagnosis)  Comment: little better   Unfortunately she can't afford Fosfamicin ( $7000) and Rifaximicin ( $ 1100) and the insurance covers very little   Plan: continue Vanc        Chief complaint:                                                      F/u C Diff     SUBJECTIVE:                                                    History of present illness:    She feels little better, but still diarrhea   Some misunderstandings at the pharmacy with the prices     LOV: (A49.8) Recurrent Clostridioides difficile infection  (primary encounter diagnosis)  Comment:   Plan: Vancomycin 125 mg orally 4 times daily for 14 days, then 125 mg orally 2 times daily for 7 days, then 125 mg orally once daily for 7 days, then 125 mg orally every 2 days for 4 weeks, then 125 mg orally every  3 days for 4 weeks  Follow up on Feb 2                  Diabetes Follow-up    How often are you checking your blood sugar? One time daily  What time of day are you checking your blood sugars (select all that apply)?  Before meals  Have you had any blood sugars above 200?  No  Have you had any blood sugars below 70?  No    What symptoms do you notice when your blood sugar is low?  None    What concerns do you have today about your diabetes? None     Do you have any of these symptoms? (Select all that apply)  No numbness or tingling in feet.  No redness, sores or blisters on feet.  No complaints of excessive thirst.  No  reports of blurry vision.  No significant changes to weight.    Have you had a diabetic eye exam in the last 12 months? No        BP Readings from Last 2 Encounters:   10/29/21 (!) 147/80   08/12/21 135/72     Hemoglobin A1C POCT (%)   Date Value   02/22/2021 6.3 (H)   09/14/2020 6.4 (H)     Hemoglobin A1C (%)   Date Value   10/07/2021 6.4 (H)     LDL Cholesterol Calculated (mg/dL)   Date Value   02/22/2021 107 (H)   03/04/2020 111 (H)                 How many servings of fruits and vegetables do you eat daily?  0-1    On average, how many sweetened beverages do you drink each day (Examples: soda, juice, sweet tea, etc.  Do NOT count diet or artificially sweetened beverages)?   0    How many days per week do you exercise enough to make your heart beat faster? 3 or less    How many minutes a day do you exercise enough to make your heart beat faster? 9 or less    How many days per week do you miss taking your medication? 0

## 2022-02-03 ENCOUNTER — TELEPHONE (OUTPATIENT)
Dept: INTERNAL MEDICINE | Facility: CLINIC | Age: 82
End: 2022-02-03
Payer: COMMERCIAL

## 2022-02-04 ENCOUNTER — VIRTUAL VISIT (OUTPATIENT)
Dept: SLEEP MEDICINE | Facility: CLINIC | Age: 82
End: 2022-02-04
Payer: COMMERCIAL

## 2022-02-04 VITALS — BODY MASS INDEX: 39.4 KG/M2 | WEIGHT: 260 LBS | HEIGHT: 68 IN

## 2022-02-04 DIAGNOSIS — G47.33 OSA (OBSTRUCTIVE SLEEP APNEA): Primary | ICD-10-CM

## 2022-02-04 DIAGNOSIS — E66.812 CLASS 2 SEVERE OBESITY DUE TO EXCESS CALORIES WITH SERIOUS COMORBIDITY AND BODY MASS INDEX (BMI) OF 39.0 TO 39.9 IN ADULT (H): ICD-10-CM

## 2022-02-04 DIAGNOSIS — Z78.9 INTOLERANCE OF CONTINUOUS POSITIVE AIRWAY PRESSURE (CPAP) VENTILATION: ICD-10-CM

## 2022-02-04 DIAGNOSIS — E66.01 CLASS 2 SEVERE OBESITY DUE TO EXCESS CALORIES WITH SERIOUS COMORBIDITY AND BODY MASS INDEX (BMI) OF 39.0 TO 39.9 IN ADULT (H): ICD-10-CM

## 2022-02-04 PROCEDURE — 99443 PR PHYSICIAN TELEPHONE EVALUATION 21-30 MIN: CPT | Mod: 95 | Performed by: INTERNAL MEDICINE

## 2022-02-04 ASSESSMENT — SLEEP AND FATIGUE QUESTIONNAIRES
HOW LIKELY ARE YOU TO NOD OFF OR FALL ASLEEP WHILE SITTING INACTIVE IN A PUBLIC PLACE: HIGH CHANCE OF DOZING
HOW LIKELY ARE YOU TO NOD OFF OR FALL ASLEEP WHEN YOU ARE A PASSENGER IN A CAR FOR AN HOUR WITHOUT A BREAK: HIGH CHANCE OF DOZING
HOW LIKELY ARE YOU TO NOD OFF OR FALL ASLEEP WHILE WATCHING TV: HIGH CHANCE OF DOZING
HOW LIKELY ARE YOU TO NOD OFF OR FALL ASLEEP WHILE SITTING AND TALKING TO SOMEONE: WOULD NEVER DOZE
HOW LIKELY ARE YOU TO NOD OFF OR FALL ASLEEP WHILE LYING DOWN TO REST IN THE AFTERNOON WHEN CIRCUMSTANCES PERMIT: HIGH CHANCE OF DOZING
HOW LIKELY ARE YOU TO NOD OFF OR FALL ASLEEP WHILE SITTING QUIETLY AFTER LUNCH WITHOUT ALCOHOL: WOULD NEVER DOZE
HOW LIKELY ARE YOU TO NOD OFF OR FALL ASLEEP IN A CAR, WHILE STOPPED FOR A FEW MINUTES IN TRAFFIC: SLIGHT CHANCE OF DOZING
HOW LIKELY ARE YOU TO NOD OFF OR FALL ASLEEP WHILE SITTING AND READING: HIGH CHANCE OF DOZING

## 2022-02-04 ASSESSMENT — MIFFLIN-ST. JEOR: SCORE: 1702.85

## 2022-02-04 NOTE — PATIENT INSTRUCTIONS
It was nice to speak with you today.  As we discussed: You are not a candidate for upper airway nerve stimulation (INSPIRE) therapy  It is possible that your daytime sleepiness could be related to your untreated sleep apnea.  We discussed the importance of resuming CPAP with oxygen.  Try to achieve a goal of at least 3 hours nightly.  Start with that, if that goes okay see if you can extend it.    Continue efforts at weight management.  Losing weight can help with your breathing at night.      Your BMI is Body mass index is 39.53 kg/m .  Weight management is a personal decision.  If you are interested in exploring weight loss strategies, the following discussion covers the approaches that may be successful. Body mass index (BMI) is one way to tell whether you are at a healthy weight, overweight, or obese. It measures your weight in relation to your height.  A BMI of 18.5 to 24.9 is in the healthy range. A person with a BMI of 25 to 29.9 is considered overweight, and someone with a BMI of 30 or greater is considered obese. More than two-thirds of American adults are considered overweight or obese.  Being overweight or obese increases the risk for further weight gain. Excess weight may lead to heart disease and diabetes.  Creating and following plans for healthy eating and physical activity may help you improve your health.  Weight control is part of healthy lifestyle and includes exercise, emotional health, and healthy eating habits. Careful eating habits lifelong are the mainstay of weight control. Though there are significant health benefits from weight loss, long-term weight loss with diet alone may be very difficult to achieve- studies show long-term success with dietary management in less than 10% of people. Attaining a healthy weight may be especially difficult to achieve in those with severe obesity. In some cases, medications, devices and surgical management might be considered.  What can you do?  If you are  overweight or obese and are interested in methods for weight loss, you should discuss this with your provider.     Consider reducing daily calorie intake by 500 calories.     Keep a food journal.     Avoiding skipping meals, consider cutting portions instead.    Diet combined with exercise helps maintain muscle while optimizing fat loss. Strength training is particularly important for building and maintaining muscle mass. Exercise helps reduce stress, increase energy, and improves fitness. Increasing exercise without diet control, however, may not burn enough calories to loose weight.       Start walking three days a week 10-20 minutes at a time    Work towards walking thirty minutes five days a week     Eventually, increase the speed of your walking for 1-2 minutes at time    And look into health and wellness programs that may be available through your health insurance provider, employer, local community center, or [employer] club.    Weight management plan: Patient was referred to their PCP to discuss a diet and exercise plan.

## 2022-02-04 NOTE — NURSING NOTE
When going through meds, pt stated that she needed to make phone call before 4:30, so I completed questionaires. Meds & Allergies were reviewed on 2/2/2022.    Pt also stated frustration is trying to make appt and not really knowing what doctor she would be seeing.    Savanna Tran VF

## 2022-02-04 NOTE — PROGRESS NOTES
"Savanna Rehman is a 81 year old female being evaluated via a billable telephone visit.     \"This telephone visit will be conducted via a call between you and your physician/provider. We have found that certain health care needs can be provided without the need for an in-person visit or physical exam.  This service lets us provide the care you need with a telephone conversation.  If a prescription is necessary we can send it directly to your pharmacy.  If lab work is needed we can place an order for that and you can then stop by our lab to have the test done at a later time.\"    Telephone visits are billed at different rates depending on your insurance coverage.  Please reach out to your insurance provider with any questions.    Patient has given verbal consent for  a Telephone visit? Yes    What telephone number would you like your provider to contact at at:  [phone]    How would you like to obtain your AVS? Mail a copy    Savanna Tran    Telephone Visit Details:     Telephone Visit Start Time: 4:32 PM    Telephone Visit End Time:4:54 PM     Chief complaint: Wondering about alternatives to CPAP.    History of Present Illness: 81-year-old female with history of sleep apnea.  Last visit in clinic was 2020 with Dr. Castillo.  She reports confsion about having an appointment with me.  She called to make an appointment and thought she had one with Dr. Castillo.  Then she suddenly received a message that she had an appointment with me.  She was a little concerned that this was some kind of scam.  She called the clinic to clarify.  She states that she has been foggy lately and a little bit sleepy.  She thinks is from her antibiotic.  She is not currently using CPAP or the oxygen.  She is wondering about alternatives.  She heard about inspire and wonders if she is a candidate.  She does not like using the CPAP.  She also does not like the large oxygen tank.    Please see Dr. Castillo's note from 2/14/2020    [place] Sleepiness " Scale  Total score - Cassandra: 16 (2/4/2022  4:20 PM)   (Less than 10 normal)    Insomnia Severity Scale  GEGE Total Score: 11  (normal 0-7, mild 8-14, moderate 15-21, severe 22-28)    Past Medical History:   Diagnosis Date     Anemia     Iron Deficiency anemia     Atrial fibrillation (H)      CAD (coronary artery disease)     non-obstructive     Chronic pain     neck, low back, legs     Congestive heart failure (H)      Degenerative disk disease      Fibromyalgia      Gastro-oesophageal reflux disease      Gout      Hiatal hernia      Mumps      Neuropathy      CRISTIANA (obstructive sleep apnea) - CPAP      Palpitations      Pernicious anemia      Sleep apnea     uses CPAP.     Urinary incontinence      Vitamin D deficiency        Allergies   Allergen Reactions     Augmented Betamethasone Diprop [Betamethasone] Other (See Comments)     Severe yeast infection     Petroleum Jelly [Petrolatum] Anaphylaxis     Rash and swelling     Shellfish-Derived Products Anaphylaxis     Tongue swelling     Aspirin Swelling     tiongue swelling     Bacitracin      Rash swelling     Bactrim [Sulfamethoxazole W/Trimethoprim] Dizziness     Coumadin [Warfarin] Swelling     Leg swelling     Darvon [Propoxyphene] Swelling     Throat closes     Dilaudid [Hydromorphone]      Levaquin [Levofloxacin] Swelling     Tongue swelling     Neomycin Swelling     rash     Neosporin [Neomycin-Polymyx-Gramicid] Swelling     rash     Nitrofurantoin      SOB, GI upset,     Oxycodone      Severe itching     Percodan [Oxycodone-Aspirin]      Severe itching     Tramadol      Vicodin [Hydrocodone-Acetaminophen]      Severe itching       Xarelto [Rivaroxaban]      Adhesive Tape Rash     Band aids      Codeine Rash     Hydrocortisone Rash and Swelling     Other Environmental Allergy Rash     Adhesive tape   Band aids        Current Outpatient Medications   Medication     balsalazide (COLAZAL) 750 MG capsule     ACE/ARB/ARNI NOT PRESCRIBED (INTENTIONAL)     alcohol  swab prep pads     B Complex-C (SUPER B COMPLEX PO)     Biotin 5000 MCG TABS     blood glucose (NO BRAND SPECIFIED) lancets standard     blood glucose (NO BRAND SPECIFIED) lancets standard     blood glucose calibration (NO BRAND SPECIFIED) solution     blood glucose monitoring (NO BRAND SPECIFIED) meter device kit     blood glucose monitoring (NO BRAND SPECIFIED) meter device kit     butalbital-aspirin-caffeine (FIORINAL) -40 MG capsule     cholecalciferol (VITAMIN D3) 5000 units (125 mcg) capsule     EPINEPHrine (EPIPEN 2-ROBBY) 0.3 MG/0.3ML injection 2-pack     estradiol (ESTRACE) 0.1 MG/GM vaginal cream     fidaxomicin (DIFICID) 200 MG tablet     glipiZIDE (GLUCOTROL XL) 2.5 MG 24 hr tablet     meclizine (ANTIVERT) 12.5 MG tablet     nystatin (MYCOSTATIN) 652112 UNIT/GM external powder     ondansetron (ZOFRAN) 8 MG tablet     ONETOUCH ULTRA test strip     order for DME     oxybutynin (DITROPAN) 5 MG tablet     vancomycin (VANCOCIN) 125 MG capsule     warfarin ANTICOAGULANT (COUMADIN) 2.5 MG tablet     Current Facility-Administered Medications   Medication     0.9% sodium chloride BOLUS     Facility-Administered Medications Ordered in Other Visits   Medication     nitroglycerin 100 MCG/ML injection       Social History     Socioeconomic History     Marital status:      Spouse name: Not on file     Number of children: Not on file     Years of education: Not on file     Highest education level: Not on file   Occupational History     Not on file   Tobacco Use     Smoking status: Former Smoker     Packs/day: 0.50     Years: 50.00     Pack years: 25.00     Types: Cigarettes     Quit date: 2014     Years since quittin.4     Smokeless tobacco: Never Used   Vaping Use     Vaping Use: Never used   Substance and Sexual Activity     Alcohol use: Yes     Comment: socially     Drug use: Never     Sexual activity: Not Currently   Other Topics Concern     Parent/sibling w/ CABG, MI or angioplasty before 65F  "55M? Not Asked   Social History Narrative     Not on file     Social Determinants of Health     Financial Resource Strain: Medium Risk     Difficulty of Paying Living Expenses: Somewhat hard   Food Insecurity: No Food Insecurity     Worried About Running Out of Food in the Last Year: Never true     Ran Out of Food in the Last Year: Never true   Transportation Needs: Unmet Transportation Needs     Lack of Transportation (Medical): Yes     Lack of Transportation (Non-Medical): Yes   Physical Activity: Not on file   Stress: Stress Concern Present     Feeling of Stress : Very much   Social Connections: Not on file   Intimate Partner Violence: Not At Risk     Fear of Current or Ex-Partner: No     Emotionally Abused: No     Physically Abused: No     Sexually Abused: No   Housing Stability: Low Risk      Unable to Pay for Housing in the Last Year: No     Number of Places Lived in the Last Year: 1     Unstable Housing in the Last Year: No       Family History   Problem Relation Age of Onset     Alzheimer Disease Mother      Lung Cancer Father      No Known Problems Brother      No Known Problems Brother      No Known Problems Brother      Unknown/Adopted No family hx of            EXAM:  Ht 1.727 m (5' 8\")   Wt 117.9 kg (260 lb)   LMP  (LMP Unknown)   BMI 39.53 kg/m    GENERAL: Alert and no distress  RESP: No audible wheeze, cough; able to speak in complete sentences  PSYCH: Mentation appears normal, affect normal, judgement and insight intact, normal speech     From Iber's note:  PREVIOUS SLEEP STUDIES: done in [place]   Date: 2011  AHI: 33.2/hr  Intervention: auto-CPAP 4-6 cmH2O  Lowest O2 saturation: 80%     Date: prior to above study  AHI: 108/hr  Intervention: CPAP  Lowest O2 saturation: 69%     PS2018  Sleep latency 7.7 minutes without sleep aid.    REM not achieved.       Sleep efficiency 78.7 %. Total sleep time 148.5 minutes.  Sleep architecture:  Stage 1, 3.7 % (5%), stage 2, 35 % (45-55%), stage " 3, 61.3 % (15-20%), stage REM, 0 % (20-25%).    AHI was 27.9/hour.   CSAI was 0/hour.   RDI 30.7/hour.    REM AHI N/A.    Supine AHI N/A.    Lowest O2 saturation: 78.6 %  She spent 28.8 minutes below 88% SpO2.   Periodic Limb Movement Index 36.4/hour    ASSESSMENT:  81-year-old female with history of obstructive sleep apnea with hypoxemia, atrial fibrillation, heart failure, coronary artery disease, fibromyalgia. She is not a candidate for upper airway nerve stimulation.  I do suspect that her sleepiness could be associated with untreated sleep apnea.  However cannot exclude component of chronic infection and medication effect.    PLAN:  Extensive discussion regarding the severity of her underlying sleep disordered breathing.  Discussed with her clearly that upper airway nerve stimulation (INSPIRE) is not an option for her.  Strongly urged her to resume using CPAP and oxygen.  Clarified with her that a reasonable initial goal would be to use it for 3 to 4 hours nightly.  At least that way she be getting some medical benefits.  Patient is willing to resume.  She is interested in following up with her primary sleep provider in person.  She agrees that this would be beneficial.  We will try to schedule this for April.    40 minutes spent on the date of the encounter doing chart review, history and exam, documentation and further activities per the note    Anjali Rabago M.D.  Pulmonary/Critical Care/Sleep Medicine    [facility]   Floor 1, [address]   [address] [place] [address]. [place] [address]   Appointments: [phone]    The above note was dictated using voice recognition software and may include typographical errors. Please contact the author for any clarifications.

## 2022-02-08 ENCOUNTER — ANTICOAGULATION THERAPY VISIT (OUTPATIENT)
Dept: ANTICOAGULATION | Facility: CLINIC | Age: 82
End: 2022-02-08
Payer: COMMERCIAL

## 2022-02-08 ENCOUNTER — MEDICAL CORRESPONDENCE (OUTPATIENT)
Dept: HEALTH INFORMATION MANAGEMENT | Facility: CLINIC | Age: 82
End: 2022-02-08
Payer: COMMERCIAL

## 2022-02-08 LAB — INR (EXTERNAL): 2.1 (ref 0.9–1.1)

## 2022-02-08 NOTE — PROGRESS NOTES
Incoming result  from [facility]    Date of result 2/8/22    INR result 2.1    Please call RN at [phone] with dosing and follow up.

## 2022-02-08 NOTE — PROGRESS NOTES
ANTICOAGULATION MANAGEMENT     Savanna Rehman 81 year old female is on warfarin with therapeutic INR result. (Goal INR 2.0-3.0)    Recent labs: (last 7 days)     02/08/22  1242   INR 2.1*       ASSESSMENT     Source(s): Chart Review and Home Care/Facility Nurse       Warfarin doses taken: Warfarin taken as instructed    Diet: Patient had increased intake of approximately 10 ounces of  spinach yesterday.     New illness, injury, or hospitalization: No    Medication/supplement changes: None noted    Signs or symptoms of bleeding or clotting: No    Previous INR: Subtherapeutic    Additional findings: None     PLAN     Recommended plan for temporary change(s) affecting INR. Increased intake of greens.    Dosing Instructions: Continue your current warfarin dose with next INR in 1 week       Summary  As of 2/8/2022    Full warfarin instructions:  3.75 mg every Mon, Wed, Fri; 2.5 mg all other days   Next INR check:  2/15/2022             Telephone call with home care nurse Raj who verbalizes understanding and agrees to plan. home care cannot see patient sooner than 2/15/22    Orders given to  Homecare nurse/facility to recheck    Education provided: Please call back if any changes to your diet, medications or how you've been taking warfarin    Plan made per [facility] anticoagulation protocol    Negrita Gramajo RN  Anticoagulation Clinic  2/8/2022    _______________________________________________________________________     Anticoagulation Episode Summary     Current INR goal:  2.0-3.0   TTR:  69.4 % (1 y)   Target end date:  Indefinite   Send INR reminders to:  NIKKI CHASE    Indications    Permanent atrial fibrillation (H) [I48.21]  Chronic atrial fibrillation (H) [I48.20]  Long term (current) use of anticoagulants [Z79.01]           Comments:  Home care          Anticoagulation Care Providers     Provider Role Specialty Phone number    Patti Suárez MD Referring Internal Medicine [phone]

## 2022-02-10 ENCOUNTER — VIRTUAL VISIT (OUTPATIENT)
Dept: INTERNAL MEDICINE | Facility: CLINIC | Age: 82
End: 2022-02-10
Payer: COMMERCIAL

## 2022-02-10 VITALS — BODY MASS INDEX: 39.53 KG/M2 | RESPIRATION RATE: 16 BRPM | HEIGHT: 68 IN

## 2022-02-10 DIAGNOSIS — A49.8 RECURRENT CLOSTRIDIOIDES DIFFICILE INFECTION: Primary | ICD-10-CM

## 2022-02-10 DIAGNOSIS — E11.65 TYPE 2 DIABETES MELLITUS WITH HYPERGLYCEMIA, WITHOUT LONG-TERM CURRENT USE OF INSULIN (H): ICD-10-CM

## 2022-02-10 DIAGNOSIS — E11.42 DIABETIC POLYNEUROPATHY ASSOCIATED WITH TYPE 2 DIABETES MELLITUS (H): ICD-10-CM

## 2022-02-10 PROCEDURE — 99442 PR PHYSICIAN TELEPHONE EVALUATION 11-20 MIN: CPT | Mod: 95 | Performed by: INTERNAL MEDICINE

## 2022-02-10 NOTE — PROGRESS NOTES
"Savanna is a 81 year old who is being evaluated via a billable telephone visit.      What phone number would you like to be contacted at? [phone]  How would you like to obtain your AVS? Mail a copy     .  This is a telephone encounter with the patient.       11:06 --- 11:19          Dr Lauren's note        ASSESSMENT & PLAN:                                                      (A49.8) Recurrent Clostridioides difficile infection  (primary encounter diagnosis)  Comment: little better   Plan: con't Vanco   F/u Feb 22    (E11.65) Type 2 diabetes mellitus with hyperglycemia, without long-term current use of insulin (H)  Comment:   Plan: blood glucose (NO BRAND SPECIFIED) lancets         standard            (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  (H)  Comment:   Plan: blood glucose (NO BRAND SPECIFIED) lancets         standard               Chief complaint:                                                      CDiff Colitis     SUBJECTIVE:                                                    History of present illness:    CDiff Colitis  -- she is down to 2 Vanco a day now  -- stools \" like pudding\"   -- she has 1 large BM in the morning and several small ones every time she urinates   -- no fever.   -- nausea better since vanco dose was decreased to 2 tabs a day          Diabetes Follow-up    How often are you checking your blood sugar? One time daily  What time of day are you checking your blood sugars (select all that apply)?  Before meals  Have you had any blood sugars above 200?  No  Have you had any blood sugars below 70?  No    What symptoms do you notice when your blood sugar is low?  None    What concerns do you have today about your diabetes? None     Do you have any of these symptoms? (Select all that apply)  Numbness in feet and Burning in feet    Have you had a diabetic eye exam in the last 12 months? No        BP Readings from Last 2 Encounters:   10/29/21 (!) 147/80   08/12/21 135/72     Hemoglobin A1C POCT " (%)   Date Value   02/22/2021 6.3 (H)   09/14/2020 6.4 (H)     Hemoglobin A1C (%)   Date Value   10/07/2021 6.4 (H)     LDL Cholesterol Calculated (mg/dL)   Date Value   02/22/2021 107 (H)   03/04/2020 111 (H)                 How many servings of fruits and vegetables do you eat daily?  0-1    On average, how many sweetened beverages do you drink each day (Examples: soda, juice, sweet tea, etc.  Do NOT count diet or artificially sweetened beverages)?   0    How many days per week do you exercise enough to make your heart beat faster? 3 or less    How many minutes a day do you exercise enough to make your heart beat faster? 9 or less    How many days per week do you miss taking your medication? 0    Review of Systems:                                                      ROS: negative for fever, chills, cough, wheezes, chest pain, shortness of breath, vomiting, abdominal pain, leg swelling         OBJECTIVE:           An actual physical exam can't be done during phone visit         PMHx: reviewed  Past Medical History:   Diagnosis Date     Anemia     Iron Deficiency anemia     Atrial fibrillation (H)      CAD (coronary artery disease)     non-obstructive     Chronic pain     neck, low back, legs     Congestive heart failure (H)      Degenerative disk disease      Fibromyalgia      Gastro-oesophageal reflux disease      Gout      Hiatal hernia      Mumps      Neuropathy      CRISTIANA (obstructive sleep apnea) - CPAP      Palpitations      Pernicious anemia      Sleep apnea     uses CPAP.     Urinary incontinence      Vitamin D deficiency       PSHx: reviewed  Past Surgical History:   Procedure Laterality Date     APPENDECTOMY       CHOLECYSTECTOMY       COLONOSCOPY  3/15/2011     CORONARY ANGIOGRAPHY ADULT ORDER       CYSTOSCOPY, BIOPSY BLADDER, COMBINED N/A 7/13/2020    Procedure: CYSTOSCOPY, WITH BLADDER BIOPSY;  Surgeon: Deisy Wilson MD;  Location: [facility]     HEART CATH LEFT HEART CATH  12/30/16    medication  management     HYSTERECTOMY TOTAL ABDOMINAL       Knee replacement NOS Left      LAPAROSCOPIC NISSEN FUNDOPLICATION N/A 2/4/2015    Procedure: LAPAROSCOPIC NISSEN FUNDOPLICATION;  Surgeon: Armando Ansari MD;  Location: [facility]     TONSILLECTOMY       TRANSPOSITION ULNAR NERVE (ELBOW)          Meds: reviewed  Current Outpatient Medications   Medication Sig Dispense Refill     ACE/ARB/ARNI NOT PRESCRIBED (INTENTIONAL) Please choose reason not prescribed, below       alcohol swab prep pads Use to swab area of injection/chandrakant as directed. 100 each 3     B Complex-C (SUPER B COMPLEX PO) Take 1 tablet by mouth At Bedtime       balsalazide (COLAZAL) 750 MG capsule Take 2,250 mg by mouth 3 times daily       Biotin 5000 MCG TABS Take 5,000 mcg by mouth At Bedtime       blood glucose (NO BRAND SPECIFIED) lancets standard Use to test blood sugar 1 time daily 100 lancet 3     blood glucose (NO BRAND SPECIFIED) lancets standard Use to test blood sugar  as directed. 1 each 0     blood glucose calibration (NO BRAND SPECIFIED) solution Use to calibrate blood glucose monitor 1 Bottle 3     blood glucose monitoring (NO BRAND SPECIFIED) meter device kit Use to test blood sugar 1 times daily or as directed. 1 kit 1     blood glucose monitoring (NO BRAND SPECIFIED) meter device kit Use to test blood sugar 1 time daily 1 kit 0     butalbital-aspirin-caffeine (FIORINAL) -40 MG capsule Take 1 capsule by mouth every 6 hours as needed for headaches 30 capsule 0     cholecalciferol (VITAMIN D3) 5000 units (125 mcg) capsule Take 5,000 Units by mouth At Bedtime       EPINEPHrine (EPIPEN 2-ROBBY) 0.3 MG/0.3ML injection 2-pack Inject 0.3 mLs (0.3 mg) into the muscle once as needed for anaphylaxis 1 each 1     estradiol (ESTRACE) 0.1 MG/GM vaginal cream Please use your finger to place a large blueberry size amount in the vagina nightly for two weeks and then three times a week at night thereafter 42.5 g 3     fidaxomicin (DIFICID) 200 MG tablet  Take 1 tablet (200 mg) by mouth 2 times daily 20 tablet 0     glipiZIDE (GLUCOTROL XL) 2.5 MG 24 hr tablet Take 1 tablet (2.5 mg) by mouth 2 times daily 180 tablet 0     meclizine (ANTIVERT) 12.5 MG tablet TAKE 1 TABLET BY MOUTH THREE TIMES DAILY AS NEEDED FOR DIZZINESS 30 tablet 0     nystatin (MYCOSTATIN) 518189 UNIT/GM external powder Apply to bilateral groin area 2x daily as needed. 30 g 1     ondansetron (ZOFRAN) 8 MG tablet Take 1 tablet (8 mg) by mouth every 8 hours as needed for nausea 30 tablet 0     ONETOUCH ULTRA test strip USE 1 STRIP TO CHECK GLUCOSE ONCE DAILY 100 strip 11     order for DME Oxygen 2 Li/min  at night bleed with CPAP       oxybutynin (DITROPAN) 5 MG tablet TAKE 1 TABLET TWICE A DAY 90 tablet 4     vancomycin (VANCOCIN) 125 MG capsule Vancomycin 125 mg orally 4 times daily for 14 days, then 125 mg orally 2 times daily for 7 days, then 125 mg orally once daily for 7 days, then 125 mg orally every 2 days for 4 weeks, then 125 mg orally every  3 days for 4 weeks 100 capsule 0     warfarin ANTICOAGULANT (COUMADIN) 2.5 MG tablet Take 1-1/2 tablets (3.75 mg) by mouth every Wednesday; and take 1 tablet (2.5 mg) all other days of the week or as directed by your [facility] Team. 126 tablet 1       Soc Hx: reviewed  Fam Hx: reviewed          Patti Lauren MD  Internal Medicine

## 2022-02-15 ENCOUNTER — ANTICOAGULATION THERAPY VISIT (OUTPATIENT)
Dept: ANTICOAGULATION | Facility: CLINIC | Age: 82
End: 2022-02-15
Payer: COMMERCIAL

## 2022-02-15 DIAGNOSIS — I48.21 PERMANENT ATRIAL FIBRILLATION (H): Primary | ICD-10-CM

## 2022-02-15 DIAGNOSIS — Z79.01 LONG TERM (CURRENT) USE OF ANTICOAGULANTS: ICD-10-CM

## 2022-02-15 DIAGNOSIS — I48.20 CHRONIC ATRIAL FIBRILLATION (H): ICD-10-CM

## 2022-02-15 LAB — INR (EXTERNAL): 2.1 (ref 0.9–1.1)

## 2022-02-15 NOTE — PROGRESS NOTES
ANTICOAGULATION MANAGEMENT     Savanna Rehman 81 year old female is on warfarin with therapeutic INR result. (Goal INR 2.0-3.0)    Recent labs: (last 7 days)     02/15/22  0000   INR 2.1*       ASSESSMENT     Source(s): Chart Review and Home Care/Facility Nurse       Warfarin doses taken: Warfarin taken as instructed    Diet: No new diet changes identified    New illness, injury, or hospitalization: No    Medication/supplement changes: Patient still taking vancomycin     Signs or symptoms of bleeding or clotting: No    Previous INR: Therapeutic last visit; previously outside of goal range    Additional findings: None     PLAN     Recommended plan for no diet, medication or health factor changes affecting INR     Dosing Instructions: Continue your current warfarin dose with next INR in 1 week       Summary  As of 2/15/2022    Full warfarin instructions:  3.75 mg every Mon, Wed, Fri; 2.5 mg all other days   Next INR check:  2/22/2022             Telephone call with home care nurse Raj who verbalizes understanding and agrees to plan    Orders given to  Homecare nurse/facility to recheck    Education provided: Please call back if any changes to your diet, medications or how you've been taking warfarin    Plan made per [facility] anticoagulation protocol    Negrita Gramajo RN  Anticoagulation Clinic  2/15/2022    _______________________________________________________________________     Anticoagulation Episode Summary     Current INR goal:  2.0-3.0   TTR:  69.4 % (1 y)   Target end date:  Indefinite   Send INR reminders to:  NIKKI CHASE    Indications    Permanent atrial fibrillation (H) [I48.21]  Chronic atrial fibrillation (H) [I48.20]  Long term (current) use of anticoagulants [Z79.01]           Comments:  Home care          Anticoagulation Care Providers     Provider Role Specialty Phone number    Patti Suárez MD Referring Internal Medicine [phone]

## 2022-02-20 ENCOUNTER — NURSE TRIAGE (OUTPATIENT)
Dept: NURSING | Facility: CLINIC | Age: 82
End: 2022-02-20
Payer: COMMERCIAL

## 2022-02-20 NOTE — TELEPHONE ENCOUNTER
"Triage call:     Patient calling that she has been feeling \"foggy\" and \"drifty\" after taking Vancomycin for c.diff. She is tapering off the antibiotic.    She is frustrated because she has thrown away a gift card and has forgotten where she places things and reports feeling unlike herself. She is alert and oriented to person, place, time.  She reports that she was told that she may feel this way from the medication.   She is able to walk with a walker but reports longstanding weakness in her hands that she feels is getting worse.     Per protocol, advised patient to be seen today. She refuses. Offered virtual appointment today. She also refuses and prefers to keep her appointment this week with her PCP.  Encouraged to call back with new or worsening symptoms or additional questions or concerns. She verbalizes understanding and agreement.     Leena Larose RN   02/20/22 1:57 PM  [facility] Nurse Advisor  Reason for Disposition    [1] Longstanding confusion (e.g., dementia, stroke) AND [2] worsening    Additional Information    Negative: [1] Difficult to awaken or acting confused (e.g., disoriented, slurred speech) AND [2] present now AND [3] has diabetes (diabetes mellitus)    Negative: [1] Difficult to awaken or acting confused (e.g., disoriented, slurred speech) AND [2] present now AND [3] new onset    Negative: [1] Weakness of the face, arm, or leg on one side of the body AND [2] new onset    Negative: [1] Numbness of the face, arm, or leg on one side of the body AND [2] new onset    Negative: [1] Loss of speech or garbled speech AND [2] new onset    Negative: Difficulty breathing or bluish lips    Negative: Shock suspected (e.g., cold/pale/clammy skin, too weak to stand, low BP, rapid pulse)    Negative: Seeing, hearing, or feeling things that are not there (i.e., visual, auditory, or tactile hallucinations)    Negative: Followed a head injury    Negative: Drug overdose suspected    Negative: Sounds like a " life-threatening emergency to the triager    Negative: Very strange or paranoid behavior    Negative: Headache or vomiting    Negative: Stiff neck (can't touch chin to chest)    Negative: Fever > 100.4 F (38.0 C)    Negative: Patient sounds very sick or weak to the triager    Negative: [1] Acting confused (e.g., disoriented, slurred speech) AND [2] brief (now gone)    Protocols used: CONFUSION - DELIRIUM-A-AH    COVID 19 Nurse Triage Plan/Patient Instructions    Please be aware that novel coronavirus (COVID-19) may be circulating in the community. If you develop symptoms such as fever, cough, or SOB or if you have concerns about the presence of another infection including coronavirus (COVID-19), please contact your health care provider or visit [email or web address].     Disposition/Instructions    In-Person Visit with provider recommended. Reference Visit Selection Guide.    Thank you for taking steps to prevent the spread of this virus.  o Limit your contact with others.  o Wear a simple mask to cover your cough.  o Wash your hands well and often.    Resources    [facility]: About COVID-19: [email or web address]    CDC: What to Do If You're Sick: www.cdc.gov/coronavirus/2019-ncov/about/steps-when-sick.html    CDC: Ending Home Isolation: www.cdc.gov/coronavirus/2019-ncov/hcp/disposition-in-home-patients.html     CDC: Caring for Someone: www.cdc.gov/coronavirus/2019-ncov/if-you-are-sick/care-for-someone.html     [facility]: Interim Guidance for Hospital Discharge to Home: www.health.Count includes the [facility].[place]/diseases/coronavirus/hcp/hospdischarge.pdf    [facility] clinical trials (COVID-19 research studies): [email or web address]     Below are the COVID-19 hotlines at the [place] [facility] ([facility]). Interpreters are available.   o For health questions: Call [phone] or 1-926.974.3514 (7 a.m. to 7 p.m.)  o For questions about schools and childcare: Call  [phone] or 1-840.169.9738 (7 a.m. to 7 p.m.)

## 2022-02-22 ENCOUNTER — VIRTUAL VISIT (OUTPATIENT)
Dept: INTERNAL MEDICINE | Facility: CLINIC | Age: 82
End: 2022-02-22
Payer: COMMERCIAL

## 2022-02-22 DIAGNOSIS — A04.72 C. DIFFICILE COLITIS: ICD-10-CM

## 2022-02-22 PROCEDURE — 99442 PR PHYSICIAN TELEPHONE EVALUATION 11-20 MIN: CPT | Mod: 95 | Performed by: INTERNAL MEDICINE

## 2022-02-22 RX ORDER — VANCOMYCIN HYDROCHLORIDE 125 MG/1
CAPSULE ORAL
Qty: 54 CAPSULE | Refills: 0 | Status: SHIPPED | OUTPATIENT
Start: 2022-02-22 | End: 2022-06-02

## 2022-02-22 NOTE — PROGRESS NOTES
This is a telephone encounter with the patient.       10:12 --- 10:30          Dr Lauren's note      Patient's instructions / PLAN:                                                        Plan:  1. Vanco 125 mg --  take 125 mg orally once daily for 7 days, then 125 mg orally every 2 days for 4 weeks, then 125 mg orally every  3 days for 4 weeks  2. Follow up in a month      ASSESSMENT & PLAN:                                                      (A04.72) C. difficile colitis  Comment: very slowly improving  Plan: vancomycin (VANCOCIN) 125 MG capsule               Chief complaint:                                                      C Diff     SUBJECTIVE:                                                    History of present illness:        Savanna is a 81 year old who is being evaluated via a billable telephone visit.        C Diff colitis  -- she takes Vanco every other day  -- she still has some discomfort in the lower abdomen  -- stools: they are semi formed in the morning and they are pudding for the rest of the day   -- she has 5-6 BM. She has a BM almost every time she urinates     She states  has no transportation to come for OV or see specialist      BP Readings from Last 2 Encounters:   10/29/21 (!) 147/80   08/12/21 135/72     Hemoglobin A1C POCT (%)   Date Value   02/22/2021 6.3 (H)   09/14/2020 6.4 (H)     Hemoglobin A1C (%)   Date Value   10/07/2021 6.4 (H)     LDL Cholesterol Calculated (mg/dL)   Date Value   02/22/2021 107 (H)   03/04/2020 111 (H)                 How many servings of fruits and vegetables do you eat daily?  2-3    On average, how many sweetened beverages do you drink each day (Examples: soda, juice, sweet tea, etc.  Do NOT count diet or artificially sweetened beverages)?   1    How many days per week do you exercise enough to make your heart beat faster? 3 or less    How many minutes a day do you exercise enough to make your heart beat faster? 9 or less    How many days per week do you  miss taking your medication? 0      Review of Systems:                                                      ROS: negative for fever, chills, cough, wheezes, chest pain, shortness of breath, vomiting, abdominal pain, leg swelling       OBJECTIVE:           An actual physical exam can't be done during phone visit       PMHx: reviewed  Past Medical History:   Diagnosis Date     Anemia     Iron Deficiency anemia     Atrial fibrillation (H)      CAD (coronary artery disease)     non-obstructive     Chronic pain     neck, low back, legs     Congestive heart failure (H)      Degenerative disk disease      Fibromyalgia      Gastro-oesophageal reflux disease      Gout      Hiatal hernia      Mumps      Neuropathy      CRISTIANA (obstructive sleep apnea) - CPAP      Palpitations      Pernicious anemia      Sleep apnea     uses CPAP.     Urinary incontinence      Vitamin D deficiency       PSHx: reviewed  Past Surgical History:   Procedure Laterality Date     APPENDECTOMY       CHOLECYSTECTOMY       COLONOSCOPY  3/15/2011     CORONARY ANGIOGRAPHY ADULT ORDER       CYSTOSCOPY, BIOPSY BLADDER, COMBINED N/A 7/13/2020    Procedure: CYSTOSCOPY, WITH BLADDER BIOPSY;  Surgeon: Deisy Wilson MD;  Location:  OR     HEART CATH LEFT HEART CATH  12/30/16    medication management     HYSTERECTOMY TOTAL ABDOMINAL       Knee replacement NOS Left      LAPAROSCOPIC NISSEN FUNDOPLICATION N/A 2/4/2015    Procedure: LAPAROSCOPIC NISSEN FUNDOPLICATION;  Surgeon: Armando Ansari MD;  Location: [facility]     TONSILLECTOMY       TRANSPOSITION ULNAR NERVE (ELBOW)          Meds: reviewed  Current Outpatient Medications   Medication Sig Dispense Refill     ACE/ARB/ARNI NOT PRESCRIBED (INTENTIONAL) Please choose reason not prescribed, below       alcohol swab prep pads Use to swab area of injection/chandrakant as directed. 100 each 3     B Complex-C (SUPER B COMPLEX PO) Take 1 tablet by mouth At Bedtime       balsalazide (COLAZAL) 750 MG capsule Take 2,250 mg  by mouth 3 times daily       Biotin 5000 MCG TABS Take 5,000 mcg by mouth At Bedtime       blood glucose (NO BRAND SPECIFIED) lancets standard Use to test blood sugar 1 time daily 100 lancet 3     blood glucose (NO BRAND SPECIFIED) lancets standard Use to test blood sugar  as directed. 1 each 0     blood glucose calibration (NO BRAND SPECIFIED) solution Use to calibrate blood glucose monitor 1 Bottle 3     blood glucose monitoring (NO BRAND SPECIFIED) meter device kit Use to test blood sugar 1 times daily or as directed. 1 kit 1     blood glucose monitoring (NO BRAND SPECIFIED) meter device kit Use to test blood sugar 1 time daily 1 kit 0     butalbital-aspirin-caffeine (FIORINAL) -40 MG capsule Take 1 capsule by mouth every 6 hours as needed for headaches 30 capsule 0     cholecalciferol (VITAMIN D3) 5000 units (125 mcg) capsule Take 5,000 Units by mouth At Bedtime       EPINEPHrine (EPIPEN 2-ROBBY) 0.3 MG/0.3ML injection 2-pack Inject 0.3 mLs (0.3 mg) into the muscle once as needed for anaphylaxis 1 each 1     estradiol (ESTRACE) 0.1 MG/GM vaginal cream Please use your finger to place a large blueberry size amount in the vagina nightly for two weeks and then three times a week at night thereafter 42.5 g 3     fidaxomicin (DIFICID) 200 MG tablet Take 1 tablet (200 mg) by mouth 2 times daily 20 tablet 0     glipiZIDE (GLUCOTROL XL) 2.5 MG 24 hr tablet Take 1 tablet (2.5 mg) by mouth 2 times daily 180 tablet 0     meclizine (ANTIVERT) 12.5 MG tablet TAKE 1 TABLET BY MOUTH THREE TIMES DAILY AS NEEDED FOR DIZZINESS 30 tablet 0     nystatin (MYCOSTATIN) 392857 UNIT/GM external powder Apply to bilateral groin area 2x daily as needed. 30 g 1     ondansetron (ZOFRAN) 8 MG tablet Take 1 tablet (8 mg) by mouth every 8 hours as needed for nausea 30 tablet 0     ONETOUCH ULTRA test strip USE 1 STRIP TO CHECK GLUCOSE ONCE DAILY 100 strip 11     order for DME Oxygen 2 Li/min  at night bleed with CPAP       oxybutynin  (DITROPAN) 5 MG tablet TAKE 1 TABLET TWICE A DAY 90 tablet 4     vancomycin (VANCOCIN) 125 MG capsule Take 125 mg daily for 4 weeks, then 125 mg every 2 days for 4 weeks, then 125 mg every 3 days for 4 weeks 54 capsule 0     vancomycin (VANCOCIN) 125 MG capsule Vancomycin 125 mg orally 4 times daily for 14 days, then 125 mg orally 2 times daily for 7 days, then 125 mg orally once daily for 7 days, then 125 mg orally every 2 days for 4 weeks, then 125 mg orally every  3 days for 4 weeks 100 capsule 0     warfarin ANTICOAGULANT (COUMADIN) 2.5 MG tablet Take 1-1/2 tablets (3.75 mg) by mouth every Wednesday; and take 1 tablet (2.5 mg) all other days of the week or as directed by your [facility] Team. 126 tablet 1       Soc Hx: reviewed  Fam Hx: reviewed          Patti Lauren MD  Internal Medicine

## 2022-02-22 NOTE — PROGRESS NOTES
Received a Vm from home care nurse Raj at 8:49 am; she will not be seeing patient today for INR but tomorrow instead. Chart updated.  Azul Whitaker, RN  Anticoagulation Nurse - [facility], [place]

## 2022-02-23 ENCOUNTER — ANTICOAGULATION THERAPY VISIT (OUTPATIENT)
Dept: ANTICOAGULATION | Facility: CLINIC | Age: 82
End: 2022-02-23
Payer: COMMERCIAL

## 2022-02-23 DIAGNOSIS — I48.21 PERMANENT ATRIAL FIBRILLATION (H): Primary | ICD-10-CM

## 2022-02-23 DIAGNOSIS — I48.20 CHRONIC ATRIAL FIBRILLATION (H): ICD-10-CM

## 2022-02-23 DIAGNOSIS — Z79.01 LONG TERM (CURRENT) USE OF ANTICOAGULANTS: ICD-10-CM

## 2022-02-23 LAB — INR (EXTERNAL): 3.4 (ref 0.9–1.1)

## 2022-02-23 NOTE — PROGRESS NOTES
ANTICOAGULATION MANAGEMENT     Savanna Rehman 81 year old female is on warfarin with supratherapeutic INR result. (Goal INR 2.0-3.0)    Recent labs: (last 7 days)     02/23/22  1315   INR 3.4*       ASSESSMENT     Source(s): Chart Review and Home Care/Facility Nurse       Warfarin doses taken: Warfarin taken as instructed    Diet: No new diet changes identified    New illness, injury, or hospitalization: No    Medication/supplement changes: taking tylenol 1,000mg     Signs or symptoms of bleeding or clotting: No    Previous INR: Therapeutic last 2(+) visits    Additional findings: None     PLAN     Recommended plan for temporary change(s) affecting INR     Dosing Instructions: Hold dose then continue your current warfarin dose with next INR in 1 week       Summary  As of 2/23/2022    Full warfarin instructions:  3.75 mg every Mon, Wed, Fri; 2.5 mg all other days   Next INR check:               Telephone call with home care nurse Raj who verbalizes understanding and agrees to plan    Orders given to  Homecare nurse/facility to recheck    Education provided: Importance of consistent vitamin K intake, Impact of vitamin K foods on INR, Vitamin K content of foods and Contact [phone]  with any changes, questions or concerns.     Plan made per [facility] anticoagulation protocol    Sanjana Fox RN  Anticoagulation Clinic  2/23/2022    _______________________________________________________________________     Anticoagulation Episode Summary     Current INR goal:  2.0-3.0   TTR:  68.7 % (1 y)   Target end date:  Indefinite   Send INR reminders to:  NIKKI CHASE    Indications    Permanent atrial fibrillation (H) [I48.21]  Chronic atrial fibrillation (H) [I48.20]  Long term (current) use of anticoagulants [Z79.01]           Comments:  Home care          Anticoagulation Care Providers     Provider Role Specialty Phone number    Patti Suárez MD Referring Internal Medicine [phone]           Sanjana Green, RN, BSN, PHN  Anticoagulation Nurse  [phone]

## 2022-02-28 ENCOUNTER — PATIENT OUTREACH (OUTPATIENT)
Dept: NURSING | Facility: CLINIC | Age: 82
End: 2022-02-28
Payer: COMMERCIAL

## 2022-02-28 NOTE — PROGRESS NOTES
Clinic Care Coordination Contact    Community Health Worker Follow Up    Care Gaps:     Health Maintenance Due   Topic Date Due     HF ACTION PLAN  Never done     HEPATITIS C SCREENING  Never done     ZOSTER IMMUNIZATION (1 of 2) Never done     LUNG CANCER SCREENING  Never done     EYE EXAM  2018     MEDICARE ANNUAL WELLNESS VISIT  2021     DIABETIC FOOT EXAM  2021     LIPID  2022       Scheduled to be addressed at routine PCP appointments      Goals:   Goals Addressed as of 2022 at 3:14 PM                    Today       1. Transportation (pt-stated)   80%    Added 22 by Nichol Berrios, RN      Goal Statement: I would like alternate transportation resources.   Date Goal set: 22  Barriers: Limited income  Strengths: Patient is motivated and engaged  Date to Achieve By: 22  Patient expressed understanding of goal: Yes  Action steps to achieve this goal:  1. I understand that RN CC will mail alternate transportation resources to me.   2. I will review resources and utilize as I see fit.   3. I will continue to work with care coordination for any additional resources and support.     , CHW:    The patient did receive the transportation resources sent by the CC RN.    Patient reports that they will be using [facility] transportation services to get to and from their dentist appointment tomorrow.     Patient is also working with  to obtain metro mobility again, as their current card has .               Intervention and Education during outreach: The CHW inquired if the patient had any other questions or concerns at this time, however they did not.     CHW Plan: Will reach out in 1 month to monitor the progression of the patient's goal and check-in to see how [employer] transportation has been going/if they will utilize the services again (talk through [facility] transportation as well, [facility] [employer].       SALVATORE BarillasS. [facility]  Worker  [facility] Care Coordination  [phone]  -----------------------------------------------------------  Next outreach: 3/29/2022  Preferred contact: [phone]    Enrollment: 1/27/2022  Last Assessment: 1/27/2022  Frequency: Monthly

## 2022-03-01 ENCOUNTER — TELEPHONE (OUTPATIENT)
Dept: SLEEP MEDICINE | Facility: CLINIC | Age: 82
End: 2022-03-01
Payer: COMMERCIAL

## 2022-03-01 NOTE — TELEPHONE ENCOUNTER
Writer spoke with patient and notified her it is best to speak with provider. She is already scheduled with Dr. Modi on 3/7/2022. Patient had no further questions.

## 2022-03-01 NOTE — TELEPHONE ENCOUNTER
Reason for call:  Other   Patient called regarding (reason for call): call back  Additional comments:   Patient would like a callback.   She has been on 2yrs oxygen tank, she doesn't use the CPAP and wants advice.    Phone number to reach patient:  Cell number on file:    Telephone Information:   Mobile [phone]       Best Time:  any    Can we leave a detailed message on this number?  YES    Travel screening: Not Applicable

## 2022-03-02 ENCOUNTER — TELEPHONE (OUTPATIENT)
Dept: ANTICOAGULATION | Facility: CLINIC | Age: 82
End: 2022-03-02
Payer: COMMERCIAL

## 2022-03-02 ENCOUNTER — ANTICOAGULATION THERAPY VISIT (OUTPATIENT)
Dept: ANTICOAGULATION | Facility: CLINIC | Age: 82
End: 2022-03-02
Payer: COMMERCIAL

## 2022-03-02 DIAGNOSIS — I48.20 CHRONIC ATRIAL FIBRILLATION (H): ICD-10-CM

## 2022-03-02 DIAGNOSIS — I48.21 PERMANENT ATRIAL FIBRILLATION (H): Primary | ICD-10-CM

## 2022-03-02 DIAGNOSIS — Z79.01 LONG TERM (CURRENT) USE OF ANTICOAGULANTS: ICD-10-CM

## 2022-03-02 LAB — INR (EXTERNAL): 2.2 (ref 0.9–1.1)

## 2022-03-02 NOTE — PROGRESS NOTES
ANTICOAGULATION MANAGEMENT     Savanna Rehman 81 year old female is on warfarin with therapeutic INR result. (Goal INR 2.0-3.0)    Recent labs: (last 7 days)     03/02/22  1409   INR 2.2*       ASSESSMENT       Source(s): Chart Review and Home Care/Facility Nurse       Warfarin doses taken: Warfarin taken as instructed    Diet: No new diet changes identified    New illness, injury, or hospitalization: No    Medication/supplement changes: None noted    Signs or symptoms of bleeding or clotting: No    Previous INR: Supratherapeutic    Additional findings: None       PLAN     Recommended plan for no diet, medication or health factor changes affecting INR     Dosing Instructions: Continue your current warfarin dose with next INR in 1 week       Summary  As of 3/2/2022    Full warfarin instructions:  3.75 mg every Mon, Wed, Fri; 2.5 mg all other days   Next INR check:  3/9/2022             Telephone call with home care nurse Evelin who verbalizes understanding and agrees to plan    Orders given to  Homecare nurse/facility to recheck    Education provided: None required    Plan made per [facility] anticoagulation protocol    Sahra Ferrari RN  Anticoagulation Clinic  3/2/2022    _______________________________________________________________________     Anticoagulation Episode Summary     Current INR goal:  2.0-3.0   TTR:  69.8 % (1 y)   Target end date:  Indefinite   Send INR reminders to:  NIKKI CHASE    Indications    Permanent atrial fibrillation (H) [I48.21]  Chronic atrial fibrillation (H) [I48.20]  Long term (current) use of anticoagulants [Z79.01]           Comments:  Home care          Anticoagulation Care Providers     Provider Role Specialty Phone number    Patti Suárez MD Referring Internal Medicine [phone]

## 2022-03-02 NOTE — TELEPHONE ENCOUNTER
ANTICOAGULATION     Savanna Rehman is overdue for INR check.      LVM for  CLIFFORD Waters to check INR today or call [facility] with update.    Miko West RN

## 2022-03-07 ENCOUNTER — TELEPHONE (OUTPATIENT)
Dept: INTERNAL MEDICINE | Facility: CLINIC | Age: 82
End: 2022-03-07

## 2022-03-07 ENCOUNTER — VIRTUAL VISIT (OUTPATIENT)
Dept: SLEEP MEDICINE | Facility: CLINIC | Age: 82
End: 2022-03-07
Payer: COMMERCIAL

## 2022-03-07 VITALS — BODY MASS INDEX: 39.4 KG/M2 | WEIGHT: 260 LBS | HEIGHT: 68 IN

## 2022-03-07 DIAGNOSIS — E11.65 TYPE 2 DIABETES MELLITUS WITH HYPERGLYCEMIA, WITHOUT LONG-TERM CURRENT USE OF INSULIN (H): ICD-10-CM

## 2022-03-07 DIAGNOSIS — G47.33 OSA (OBSTRUCTIVE SLEEP APNEA): Primary | ICD-10-CM

## 2022-03-07 PROCEDURE — 99443 PR PHYSICIAN TELEPHONE EVALUATION 21-30 MIN: CPT | Mod: 95 | Performed by: INTERNAL MEDICINE

## 2022-03-07 ASSESSMENT — SLEEP AND FATIGUE QUESTIONNAIRES
HOW LIKELY ARE YOU TO NOD OFF OR FALL ASLEEP IN A CAR, WHILE STOPPED FOR A FEW MINUTES IN TRAFFIC: WOULD NEVER DOZE
HOW LIKELY ARE YOU TO NOD OFF OR FALL ASLEEP WHEN YOU ARE A PASSENGER IN A CAR FOR AN HOUR WITHOUT A BREAK: SLIGHT CHANCE OF DOZING
HOW LIKELY ARE YOU TO NOD OFF OR FALL ASLEEP WHILE SITTING AND TALKING TO SOMEONE: SLIGHT CHANCE OF DOZING
HOW LIKELY ARE YOU TO NOD OFF OR FALL ASLEEP WHILE WATCHING TV: MODERATE CHANCE OF DOZING
HOW LIKELY ARE YOU TO NOD OFF OR FALL ASLEEP WHILE SITTING AND READING: MODERATE CHANCE OF DOZING
HOW LIKELY ARE YOU TO NOD OFF OR FALL ASLEEP WHILE LYING DOWN TO REST IN THE AFTERNOON WHEN CIRCUMSTANCES PERMIT: HIGH CHANCE OF DOZING
HOW LIKELY ARE YOU TO NOD OFF OR FALL ASLEEP WHILE SITTING QUIETLY AFTER LUNCH WITHOUT ALCOHOL: SLIGHT CHANCE OF DOZING
HOW LIKELY ARE YOU TO NOD OFF OR FALL ASLEEP WHILE SITTING INACTIVE IN A PUBLIC PLACE: SLIGHT CHANCE OF DOZING

## 2022-03-07 NOTE — TELEPHONE ENCOUNTER
Call to patient. Gave dates of vanco treatment. Patient states she did have a couple of virtual visits with ID. Patient is wondering if she should see them again. Patient states she has lived with this for a long time and she is becoming more depressed. Advised follow up with ID would be appropriate. Patient has virtual visit scheduled with ID 3/16/22 and has appointment with Dr. Lauren on 3/15/22. Patient is asking about rescheduling appointment with Dr. Lauren later after she follows up with ID. Per last virtual visit note 2/22/22 patient was to follow up in 1 month. Appointment rescheduled.     Next 5 appointments (look out 90 days)    Mar 22, 2022  5:00 PM  (Arrive by 4:40 PM)  Provider Visit with Patti Suárez MD  [facility] ([facility] [address] [facility] [address]  [phone]

## 2022-03-07 NOTE — PROGRESS NOTES
"Savanna Rehman is a 81 year old female being evaluated via a billable telephone visit.     \"This telephone visit will be conducted via a call between you and your physician/provider. We have found that certain health care needs can be provided without the need for an in-person visit or physical exam.  This service lets us provide the care you need with a telephone conversation.  If a prescription is necessary we can send it directly to your pharmacy.  If lab work is needed we can place an order for that and you can then stop by our lab to have the test done at a later time.\"    Telephone visits are billed at different rates depending on your insurance coverage.  Please reach out to your insurance provider with any questions.    Patient has given verbal consent for  a Telephone visit? Yes    What telephone number would you like your provider to contact at at:  [phone]   How would you like to obtain your AVS? Mail a copy    Ana Molina    Telephone Visit Details:     Telephone Visit Start Time: 2:11 PM    Telephone Visit End Time:2:33pm    [facility]     Sleep clinic follow up visit note    Date on this visit: 3/7/2022    Primary Physician: Patti Suárez     Chief complaint:     Savanna Rehman 81 year old female who presents to sleep clinic for telephone visit today for follow up of previously diagnosed CRISTIANA (currently untreated).  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  She was prescribed auto titrating CPAP pressure settings 10 to 15 cm of water along with oxygen supplementation for the management of the sleep-related breathing disorder.    She reported that she stopped using the CPAP machine 3 years ago on the [place] night after she met the compliance.  She reported that does not like using the CPAP.  She also does not like the large oxygen tank. She " hated  the CPAP machine and used it for just 4 hours to meet the compliance and now owns the machine.  She is not currently using CPAP or the oxygen.  She presented for a telephone visit with my colleague Dr. Rabago, on 2022 for sleep apnea and mentioned that she was not using CPAP or the oxygen and wondered about  alternative options to treat the sleep apnea.  She heard about inspire and wondered if she is a candidate.  She was informed that she is not a candidate for inspire.  She presents for a telephone visit to the sleep clinic with me today mentioning that she is interested in restarting the CPAP use.  She reported that her brother passed away in sleep and  it was likely due to sleep apnea.  She wants to use the CPAP machine even though she does not like it because she does not want to die in her sleep from sleep apnea.  She wants to know if she needs to use the oxygen with the CPAP and keep paying for the oxygen.  She used a nasal mask before and wants to check for other mask options.    PREVIOUS SLEEP STUDIES: done in [place]   Date: 2011  AHI: 33.2/hr  Intervention: auto-CPAP 4-6 cmH2O  Lowest O2 saturation: 80     Date: prior to above study  AHI: 108/hr  Intervention: CPAP  Lowest O2 saturation: 69%     PS2018 ([place] [facility])  Diagnostic PSG  REM sleep not seen on baseline.  Respiration: Moderate obstructive sleep apnea and sustained hypoxemia.     Events ? The polysomnogram revealed a presence of 2 obstructive, - central, and - mixed apneas resulting in an apnea index of 0.8 events per hour. There were 67 obstructive hypopneas and - central hypopneas resulting in an obstructive hypopnea index of 27.1 and central hypopnea index of - events per hour. The combined apnea/hypopnea index was 27.9 events per hour (central apnea/hypopnea index was - events per hour).  The REM AHI was - events per hour. The supine AHI was - events per hour. The RERA index was 2.8 events per  hour. The RDI was 30.7 events per hour.    Snoring - was reported as loud.    Respiratory rate and pattern - was notable for normal respiratory rate and pattern.    Sustained Sleep Associated Hypoventilation - was not present with a maximum change from 37.9 to 42.5 mmHg and 0 minutes at or greater than 55 mmHg.    Sleep Associated Hypoxemia - (Greater than 5 minutes O2 sat at or below 88%) was present. Baseline oxygen saturation was 87.1%. Lowest oxygen saturation was 78.6%. Time spent less than or equal to 88% was 124.8 minutes. Time spent less than or equal to 89% was 131.4 minutes.      Treatment PSG:  CPAP titration was performed and sleep disordered breathing events responded to CPAP.  An optimal pressure in supine REM could not be finalized in the test. Hypoxemia remained after correction of sleep disordered breathing and was corrected by addition of supplemental O2 at 1L/min.     Allergies:    Allergies   Allergen Reactions     Augmented Betamethasone Diprop [Betamethasone] Other (See Comments)     Severe yeast infection     Petroleum Jelly [Petrolatum] Anaphylaxis     Rash and swelling     Shellfish-Derived Products Anaphylaxis     Tongue swelling     Aspirin Swelling     tiongue swelling     Bacitracin      Rash swelling     Bactrim [Sulfamethoxazole W/Trimethoprim] Dizziness     Coumadin [Warfarin] Swelling     Leg swelling     Darvon [Propoxyphene] Swelling     Throat closes     Dilaudid [Hydromorphone]      Levaquin [Levofloxacin] Swelling     Tongue swelling     Neomycin Swelling     rash     Neosporin [Neomycin-Polymyx-Gramicid] Swelling     rash     Nitrofurantoin      SOB, GI upset,     Oxycodone      Severe itching     Percodan [Oxycodone-Aspirin]      Severe itching     Tramadol      Vicodin [Hydrocodone-Acetaminophen]      Severe itching       Xarelto [Rivaroxaban]      Adhesive Tape Rash     Band aids      Codeine Rash     Hydrocortisone Rash and Swelling     Other Environmental Allergy Rash      Adhesive tape   Band aids        Medications:    Current Outpatient Medications   Medication Sig Dispense Refill     ACE/ARB/ARNI NOT PRESCRIBED (INTENTIONAL) Please choose reason not prescribed, below       alcohol swab prep pads Use to swab area of injection/chandrakant as directed. 100 each 3     B Complex-C (SUPER B COMPLEX PO) Take 1 tablet by mouth At Bedtime       balsalazide (COLAZAL) 750 MG capsule Take 2,250 mg by mouth 3 times daily       Biotin 5000 MCG TABS Take 5,000 mcg by mouth At Bedtime       blood glucose (NO BRAND SPECIFIED) lancets standard Use to test blood sugar 1 time daily 100 lancet 3     blood glucose (NO BRAND SPECIFIED) lancets standard Use to test blood sugar  as directed. 1 each 0     blood glucose calibration (NO BRAND SPECIFIED) solution Use to calibrate blood glucose monitor 1 Bottle 3     blood glucose monitoring (NO BRAND SPECIFIED) meter device kit Use to test blood sugar 1 times daily or as directed. 1 kit 1     blood glucose monitoring (NO BRAND SPECIFIED) meter device kit Use to test blood sugar 1 time daily 1 kit 0     butalbital-aspirin-caffeine (FIORINAL) -40 MG capsule Take 1 capsule by mouth every 6 hours as needed for headaches 30 capsule 0     cholecalciferol (VITAMIN D3) 5000 units (125 mcg) capsule Take 5,000 Units by mouth At Bedtime       EPINEPHrine (EPIPEN 2-ROBBY) 0.3 MG/0.3ML injection 2-pack Inject 0.3 mLs (0.3 mg) into the muscle once as needed for anaphylaxis 1 each 1     estradiol (ESTRACE) 0.1 MG/GM vaginal cream Please use your finger to place a large blueberry size amount in the vagina nightly for two weeks and then three times a week at night thereafter 42.5 g 3     fidaxomicin (DIFICID) 200 MG tablet Take 1 tablet (200 mg) by mouth 2 times daily 20 tablet 0     glipiZIDE (GLUCOTROL XL) 2.5 MG 24 hr tablet Take 1 tablet (2.5 mg) by mouth 2 times daily 180 tablet 0     meclizine (ANTIVERT) 12.5 MG tablet TAKE 1 TABLET BY MOUTH THREE TIMES DAILY AS NEEDED  FOR DIZZINESS 30 tablet 0     nystatin (MYCOSTATIN) 287540 UNIT/GM external powder Apply to bilateral groin area 2x daily as needed. 30 g 1     ondansetron (ZOFRAN) 8 MG tablet Take 1 tablet (8 mg) by mouth every 8 hours as needed for nausea 30 tablet 0     ONETOUCH ULTRA test strip USE 1 STRIP TO CHECK GLUCOSE ONCE DAILY 100 strip 11     order for DME Oxygen 2 Li/min  at night bleed with CPAP       oxybutynin (DITROPAN) 5 MG tablet TAKE 1 TABLET TWICE A DAY 90 tablet 4     vancomycin (VANCOCIN) 125 MG capsule Take 125 mg daily for 4 weeks, then 125 mg every 2 days for 4 weeks, then 125 mg every 3 days for 4 weeks 54 capsule 0     vancomycin (VANCOCIN) 125 MG capsule Vancomycin 125 mg orally 4 times daily for 14 days, then 125 mg orally 2 times daily for 7 days, then 125 mg orally once daily for 7 days, then 125 mg orally every 2 days for 4 weeks, then 125 mg orally every  3 days for 4 weeks 100 capsule 0     warfarin ANTICOAGULANT (COUMADIN) 2.5 MG tablet Take 1-1/2 tablets (3.75 mg) by mouth every Wednesday; and take 1 tablet (2.5 mg) all other days of the week or as directed by your [facility] Clinic Team. 126 tablet 1       Problem List:  Patient Active Problem List    Diagnosis Date Noted     Chronic kidney disease, stage 3 10/07/2021     Priority: Medium     Ascending aorta dilatation (H) 04/20/2021     Priority: Medium     Nonrheumatic tricuspid valve regurgitation 04/20/2021     Priority: Medium     Anemia 12/16/2020     Priority: Medium     Formatting of this note might be different from the original.  Iron Deficiency anemia       Gastroesophageal reflux disease 12/16/2020     Priority: Medium     Gout 12/16/2020     Priority: Medium     Diabetes mellitus, type 2 (H) 09/03/2020     Priority: Medium     Vitamin D deficiency      Priority: Medium     Hyperlipidemia LDL goal <100 12/10/2019     Priority: Medium     Permanent atrial fibrillation (H) 10/23/2019     Priority: Medium     Long term (current) use of  anticoagulants 10/23/2019     Priority: Medium     Chronic atrial fibrillation (H) 10/21/2019     Priority: Medium     Recurrent UTI 08/13/2019     Priority: Medium     Urgency-frequency syndrome 08/13/2019     Priority: Medium     Urgency incontinence 08/13/2019     Priority: Medium     Candidiasis of skin 08/13/2019     Priority: Medium     Morbid obesity (H) 08/29/2018     Priority: Medium     CRISTIANA (obstructive sleep apnea)  08/29/2018     Priority: Medium     Angioedema 03/09/2015     Priority: Medium        Past Medical/Surgical History:  Past Medical History:   Diagnosis Date     Anemia     Iron Deficiency anemia     Atrial fibrillation (H)      CAD (coronary artery disease)     non-obstructive     Chronic pain     neck, low back, legs     Congestive heart failure (H)      Degenerative disk disease      Fibromyalgia      Gastro-oesophageal reflux disease      Gout      Hiatal hernia      Mumps      Neuropathy      CRISTIANA (obstructive sleep apnea) - CPAP      Palpitations      Pernicious anemia      Sleep apnea     uses CPAP.     Urinary incontinence      Vitamin D deficiency      Past Surgical History:   Procedure Laterality Date     APPENDECTOMY       CHOLECYSTECTOMY       COLONOSCOPY  3/15/2011     CORONARY ANGIOGRAPHY ADULT ORDER       CYSTOSCOPY, BIOPSY BLADDER, COMBINED N/A 7/13/2020    Procedure: CYSTOSCOPY, WITH BLADDER BIOPSY;  Surgeon: Deisy Wilson MD;  Location: [facility] OR     HEART CATH LEFT HEART CATH  12/30/16    medication management     HYSTERECTOMY TOTAL ABDOMINAL       Knee replacement NOS Left      LAPAROSCOPIC NISSEN FUNDOPLICATION N/A 2/4/2015    Procedure: LAPAROSCOPIC NISSEN FUNDOPLICATION;  Surgeon: Armando Ansari MD;  Location: [facility] OR     TONSILLECTOMY       TRANSPOSITION ULNAR NERVE (ELBOW)         Social History:  Social History     Socioeconomic History     Marital status:      Spouse name: Not on file     Number of children: Not on file     Years of education: Not on file  "    Highest education level: Not on file   Occupational History     Not on file   Tobacco Use     Smoking status: Former Smoker     Packs/day: 0.50     Years: 50.00     Pack years: 25.00     Types: Cigarettes     Quit date: 2014     Years since quittin.5     Smokeless tobacco: Never Used   Vaping Use     Vaping Use: Never used   Substance and Sexual Activity     Alcohol use: Yes     Comment: socially     Drug use: Never     Sexual activity: Not Currently   Other Topics Concern     Parent/sibling w/ CABG, MI or angioplasty before 65F 55M? Not Asked   Social History Narrative     Not on file     Social Determinants of Health     Financial Resource Strain: Medium Risk     Difficulty of Paying Living Expenses: Somewhat hard   Food Insecurity: No Food Insecurity     Worried About Running Out of Food in the Last Year: Never true     Ran Out of Food in the Last Year: Never true   Transportation Needs: Unmet Transportation Needs     Lack of Transportation (Medical): Yes     Lack of Transportation (Non-Medical): Yes   Physical Activity: Not on file   Stress: Stress Concern Present     Feeling of Stress : Very much   Social Connections: Not on file   Intimate Partner Violence: Not At Risk     Fear of Current or Ex-Partner: No     Emotionally Abused: No     Physically Abused: No     Sexually Abused: No   Housing Stability: Low Risk      Unable to Pay for Housing in the Last Year: No     Number of Places Lived in the Last Year: 1     Unstable Housing in the Last Year: No       Family History:  Family History   Problem Relation Age of Onset     Alzheimer Disease Mother      Lung Cancer Father      No Known Problems Brother      No Known Problems Brother      No Known Problems Brother      Unknown/Adopted No family hx of          Physical Examination:  Vitals: Ht 1.727 m (5' 8\")   Wt 117.9 kg (260 lb)   LMP  (LMP Unknown)   BMI 39.53 kg/m    BMI= Body mass index is 39.53 kg/m .  [place] Total Score 3/7/2022   Total " score - [place] 11     General: No apparent distress  Chest: No cough, no audible wheezing, able to talk in full sentences  Psych: coherent speech, normal rate and volume, able to articulate logical thoughts, able   to abstract reason, no tangential thoughts, no hallucinations   or delusions  Her affect is normal  Neuro:  Mental status: Alert and  Oriented X 3  Speech: normal       Impression/Plan:  Previously diagnosed moderate obstructive sleep apne with sleep associated hypoxemia: Patient has been noncompliant with CPAP for the past couple of years and is now interested in  resuming CPAP treatment.  Recommended her to follow-up with [facility] medical DME to make sure the pressure settings are set correctly and also to obtain mask fit optimization.  Recommended her to use the device regularly during sleep and use it for the entire duration of sleep to derive maximum benefit.  She will be followed up through the sleep therapy management program.  Recommend obtaining an overnight oximetry with the auto titrating CPAP in approximately 2 weeks after she restarts the CPAP use, in order to check for any hypoxemia to determine the need for O2 supplementation through the CPAP device.  I have communicated with [facility] medical DME via [employer] to reach out to the patient to schedule a visit to help with checking the pressure settings and also to help with mask optimization.  I also generated DME orders for renewal of the PAP supplies.  The plan is to communicate the results of the overnight oximetry with the patient via a letter.  She will need a follow-up telephone visit in approximately 6 weeks after she restarts the CPAP treatment when compliance measures are reviewed.    Obesity: We discussed weight management with healthy diet, and exercise.    Patient was strongly advised to avoid driving, operating any heavy machinery or other hazardous situations while drowsy or sleepy.  Patient was counseled on the importance  "of driving while alert, to pull over if drowsy, or nap before getting into the vehicle if sleepy.      CC: No ref. provider found    The above note was dictated using voice recognition software. Although reviewed after completion, some word and grammatical error may remain . Please contact the author for any clarifications.    \"I spent a total of  40 minutes  with Savanna Rehman during today's  Telephone visit. Most of this time was spent counseling the patient and  coordinating care regarding sleep disordered breathing, CPAP treatment, overnight oximetry testing with CPAP, communication with [facility], ,weight management , going over reports of previous sleep studies, chart review  including documentation and further activities as noted above.\"      Jeannette Modi MD  [facility] [address] [address] [phone]  Dept: [phone]                "

## 2022-03-07 NOTE — NURSING NOTE
Flu vaccine 9/27/21 .  Pt would like a call to determine how long she should continue to take vancomycin.

## 2022-03-07 NOTE — TELEPHONE ENCOUNTER
1.  Wants to know when she started vancomycin?    2.  She is not getting well from her c. Diff.  Would you be upset if she had a phone visit with Dr. Granados, ID?

## 2022-03-08 ENCOUNTER — ANTICOAGULATION THERAPY VISIT (OUTPATIENT)
Dept: ANTICOAGULATION | Facility: CLINIC | Age: 82
End: 2022-03-08
Payer: COMMERCIAL

## 2022-03-08 DIAGNOSIS — I48.21 PERMANENT ATRIAL FIBRILLATION (H): Primary | ICD-10-CM

## 2022-03-08 DIAGNOSIS — Z79.01 LONG TERM (CURRENT) USE OF ANTICOAGULANTS: ICD-10-CM

## 2022-03-08 DIAGNOSIS — I48.20 CHRONIC ATRIAL FIBRILLATION (H): ICD-10-CM

## 2022-03-08 LAB — INR (EXTERNAL): 2.1 (ref 0.9–1.1)

## 2022-03-08 NOTE — PROGRESS NOTES
ANTICOAGULATION MANAGEMENT     Savanna Rehman 81 year old female is on warfarin with therapeutic INR result. (Goal INR 2.0-3.0)    Recent labs: (last 7 days)     03/08/22  1351   INR 2.1*       ASSESSMENT       Source(s): Chart Review and Home Care/Facility Nurse       Warfarin doses taken: Warfarin taken as instructed    Diet: No new diet changes identified    New illness, injury, or hospitalization: No    Medication/supplement changes: continues on oral vanco for c diff. Taking for 12 weeks starting 02/22/20222    Signs or symptoms of bleeding or clotting: No    Previous INR: Therapeutic last visit; previously outside of goal range    Additional findings: None       PLAN     Recommended plan for no diet, medication or health factor changes affecting INR     Dosing Instructions: Continue your current warfarin dose with next INR in 2 weeks       Summary  As of 3/8/2022    Full warfarin instructions:  3.75 mg every Mon, Wed, Fri; 2.5 mg all other days   Next INR check:  3/22/2022             Detailed voice message left for home care nurse Raj with dosing instructions and follow up date.     Orders given to  Homecare nurse/facility to recheck    Education provided: Goal range and significance of current result    Plan made per [facility] anticoagulation protocol    Miko West RN  Anticoagulation Clinic  3/8/2022    _______________________________________________________________________     Anticoagulation Episode Summary     Current INR goal:  2.0-3.0   TTR:  71.4 % (1 y)   Target end date:  Indefinite   Send INR reminders to:  NIKKI CHASE    Indications    Permanent atrial fibrillation (H) [I48.21]  Chronic atrial fibrillation (H) [I48.20]  Long term (current) use of anticoagulants [Z79.01]           Comments:  Home care          Anticoagulation Care Providers     Provider Role Specialty Phone number    Patti Suárez MD Referring Internal Medicine [phone]

## 2022-03-08 NOTE — TELEPHONE ENCOUNTER
Patient calls to get clarification on where this RX is?     blood glucose monitoring (NO BRAND SPECIFIED) meter device kit    Best number to call back [phone]

## 2022-03-10 ENCOUNTER — TELEPHONE (OUTPATIENT)
Dept: SLEEP MEDICINE | Facility: CLINIC | Age: 82
End: 2022-03-10
Payer: COMMERCIAL

## 2022-03-10 NOTE — TELEPHONE ENCOUNTER
Message left for patient to contact Deisy at the [facility] clinic in regards to the DME company uses for cpap supplies. MC orders will be sent to her current DME supplier once that information is obtained.

## 2022-03-10 NOTE — TELEPHONE ENCOUNTER
Patient called back, she is currently using [facility] as her DME supplier. MC orders have been faxed to [phone], [facility]

## 2022-03-13 ENCOUNTER — APPOINTMENT (OUTPATIENT)
Dept: CT IMAGING | Facility: CLINIC | Age: 82
End: 2022-03-13
Attending: EMERGENCY MEDICINE
Payer: COMMERCIAL

## 2022-03-13 ENCOUNTER — HOSPITAL ENCOUNTER (EMERGENCY)
Facility: CLINIC | Age: 82
Discharge: HOME | End: 2022-03-13
Attending: EMERGENCY MEDICINE | Admitting: EMERGENCY MEDICINE
Payer: COMMERCIAL

## 2022-03-13 ENCOUNTER — NURSE TRIAGE (OUTPATIENT)
Dept: NURSING | Facility: CLINIC | Age: 82
End: 2022-03-13
Payer: COMMERCIAL

## 2022-03-13 VITALS
TEMPERATURE: 98 F | DIASTOLIC BLOOD PRESSURE: 67 MMHG | OXYGEN SATURATION: 96 % | HEART RATE: 70 BPM | RESPIRATION RATE: 13 BRPM | SYSTOLIC BLOOD PRESSURE: 128 MMHG

## 2022-03-13 DIAGNOSIS — K52.9 CHRONIC DIARRHEA: ICD-10-CM

## 2022-03-13 DIAGNOSIS — R11.0 NAUSEA: ICD-10-CM

## 2022-03-13 DIAGNOSIS — N39.0 ACUTE UTI: ICD-10-CM

## 2022-03-13 DIAGNOSIS — R10.9 ABDOMINAL CRAMPING: ICD-10-CM

## 2022-03-13 LAB
ALBUMIN UR-MCNC: NEGATIVE MG/DL
ANION GAP SERPL CALCULATED.3IONS-SCNC: 4 MMOL/L (ref 3–14)
APPEARANCE UR: CLEAR
BACTERIA #/AREA URNS HPF: ABNORMAL /HPF
BASOPHILS # BLD AUTO: 0.1 10E3/UL (ref 0–0.2)
BASOPHILS NFR BLD AUTO: 1 %
BILIRUB UR QL STRIP: NEGATIVE
BUN SERPL-MCNC: 14 MG/DL (ref 7–30)
CALCIUM SERPL-MCNC: 9 MG/DL (ref 8.5–10.1)
CHLORIDE BLD-SCNC: 103 MMOL/L (ref 94–109)
CO2 SERPL-SCNC: 31 MMOL/L (ref 20–32)
COLOR UR AUTO: YELLOW
CREAT BLD-MCNC: 1.1 MG/DL (ref 0.5–1)
CREAT SERPL-MCNC: 1.02 MG/DL (ref 0.52–1.04)
EOSINOPHIL # BLD AUTO: 0.2 10E3/UL (ref 0–0.7)
EOSINOPHIL NFR BLD AUTO: 3 %
ERYTHROCYTE [DISTWIDTH] IN BLOOD BY AUTOMATED COUNT: 13.1 % (ref 10–15)
GFR SERPL CREATININE-BSD FRML MDRD: 51 ML/MIN/1.73M2
GFR SERPL CREATININE-BSD FRML MDRD: 56 ML/MIN/1.73M2
GLUCOSE BLD-MCNC: 130 MG/DL (ref 70–99)
GLUCOSE UR STRIP-MCNC: NEGATIVE MG/DL
HCT VFR BLD AUTO: 45.5 % (ref 35–47)
HGB BLD-MCNC: 14.9 G/DL (ref 11.7–15.7)
HGB UR QL STRIP: NEGATIVE
IMM GRANULOCYTES # BLD: 0 10E3/UL
IMM GRANULOCYTES NFR BLD: 0 %
INR PPP: 2.59 (ref 0.85–1.15)
KETONES UR STRIP-MCNC: NEGATIVE MG/DL
LEUKOCYTE ESTERASE UR QL STRIP: ABNORMAL
LYMPHOCYTES # BLD AUTO: 1.4 10E3/UL (ref 0.8–5.3)
LYMPHOCYTES NFR BLD AUTO: 20 %
MCH RBC QN AUTO: 30.8 PG (ref 26.5–33)
MCHC RBC AUTO-ENTMCNC: 32.7 G/DL (ref 31.5–36.5)
MCV RBC AUTO: 94 FL (ref 78–100)
MONOCYTES # BLD AUTO: 0.7 10E3/UL (ref 0–1.3)
MONOCYTES NFR BLD AUTO: 10 %
MUCOUS THREADS #/AREA URNS LPF: PRESENT /LPF
NEUTROPHILS # BLD AUTO: 4.5 10E3/UL (ref 1.6–8.3)
NEUTROPHILS NFR BLD AUTO: 66 %
NITRATE UR QL: POSITIVE
NRBC # BLD AUTO: 0 10E3/UL
NRBC BLD AUTO-RTO: 0 /100
PH UR STRIP: 6 [PH] (ref 5–7)
PLATELET # BLD AUTO: 188 10E3/UL (ref 150–450)
POTASSIUM BLD-SCNC: 4.5 MMOL/L (ref 3.4–5.3)
RBC # BLD AUTO: 4.84 10E6/UL (ref 3.8–5.2)
RBC URINE: 2 /HPF
SODIUM SERPL-SCNC: 138 MMOL/L (ref 133–144)
SP GR UR STRIP: 1.03 (ref 1–1.03)
SQUAMOUS EPITHELIAL: 2 /HPF
UROBILINOGEN UR STRIP-MCNC: NORMAL MG/DL
WBC # BLD AUTO: 6.9 10E3/UL (ref 4–11)
WBC URINE: 84 /HPF

## 2022-03-13 PROCEDURE — 99285 EMERGENCY DEPT VISIT HI MDM: CPT | Mod: 25

## 2022-03-13 PROCEDURE — 74177 CT ABD & PELVIS W/CONTRAST: CPT

## 2022-03-13 PROCEDURE — 258N000003 HC RX IP 258 OP 636: Performed by: EMERGENCY MEDICINE

## 2022-03-13 PROCEDURE — 80048 BASIC METABOLIC PNL TOTAL CA: CPT | Performed by: EMERGENCY MEDICINE

## 2022-03-13 PROCEDURE — 93005 ELECTROCARDIOGRAM TRACING: CPT

## 2022-03-13 PROCEDURE — 81001 URINALYSIS AUTO W/SCOPE: CPT | Performed by: EMERGENCY MEDICINE

## 2022-03-13 PROCEDURE — 250N000009 HC RX 250: Performed by: EMERGENCY MEDICINE

## 2022-03-13 PROCEDURE — 85025 COMPLETE CBC W/AUTO DIFF WBC: CPT | Performed by: EMERGENCY MEDICINE

## 2022-03-13 PROCEDURE — 87086 URINE CULTURE/COLONY COUNT: CPT | Performed by: EMERGENCY MEDICINE

## 2022-03-13 PROCEDURE — 250N000011 HC RX IP 250 OP 636: Performed by: EMERGENCY MEDICINE

## 2022-03-13 PROCEDURE — 36415 COLL VENOUS BLD VENIPUNCTURE: CPT | Performed by: EMERGENCY MEDICINE

## 2022-03-13 PROCEDURE — 85610 PROTHROMBIN TIME: CPT | Performed by: EMERGENCY MEDICINE

## 2022-03-13 PROCEDURE — 82565 ASSAY OF CREATININE: CPT | Mod: 91

## 2022-03-13 RX ORDER — CEPHALEXIN 500 MG/1
500 CAPSULE ORAL 4 TIMES DAILY
Qty: 28 CAPSULE | Refills: 0 | Status: SHIPPED | OUTPATIENT
Start: 2022-03-13 | End: 2022-03-20

## 2022-03-13 RX ORDER — ACETAMINOPHEN 500 MG
1000 TABLET ORAL ONCE
Status: DISCONTINUED | OUTPATIENT
Start: 2022-03-13 | End: 2022-03-13 | Stop reason: HOSPADM

## 2022-03-13 RX ORDER — IOPAMIDOL 755 MG/ML
500 INJECTION, SOLUTION INTRAVASCULAR ONCE
Status: COMPLETED | OUTPATIENT
Start: 2022-03-13 | End: 2022-03-13

## 2022-03-13 RX ORDER — CEPHALEXIN 500 MG/1
500 CAPSULE ORAL ONCE
Status: DISCONTINUED | OUTPATIENT
Start: 2022-03-13 | End: 2022-03-13 | Stop reason: HOSPADM

## 2022-03-13 RX ADMIN — SODIUM CHLORIDE 500 ML: 9 INJECTION, SOLUTION INTRAVENOUS at 19:02

## 2022-03-13 RX ADMIN — IOPAMIDOL 100 ML: 755 INJECTION, SOLUTION INTRAVENOUS at 19:29

## 2022-03-13 RX ADMIN — SODIUM CHLORIDE 65 ML: 9 INJECTION, SOLUTION INTRAVENOUS at 19:29

## 2022-03-13 ASSESSMENT — ENCOUNTER SYMPTOMS
FEVER: 0
VOMITING: 0
FATIGUE: 1
ABDOMINAL PAIN: 1
APPETITE CHANGE: 0
BLOOD IN STOOL: 0
NAUSEA: 1
CHILLS: 0
DIARRHEA: 1

## 2022-03-13 NOTE — ED TRIAGE NOTES
Pt presents for increased generalized weakness and lower abdominal pain starting at 3pm today. Hx of CDiff. ABCs intact A&Ox4

## 2022-03-13 NOTE — ED PROVIDER NOTES
"  History   Chief Complaint:  Abdominal Pain and Generalized Weakness     The history is provided by the patient and a relative (daughter).      Savanna Rehman is a 81 year old female anticoagulated on Coumadin with history of atrial fibrillation, CAD, CHF, diabetes, CKD stage III, and C. Diff on vancomycin who presents with abdominal pain. The patient reports waking up today with diffuse lower abdominal pain along with the urge to vomit and have diarrhea. She sat on the toilet for an hour but did not have vomiting nor diarrhea. She still currently has abdominal pain that she describes as \"grabbing.\" She notes similar pain back in October when she was diagnosed with C. Diff. Her daughter notes that she usually complains of this same abdominal pain everyday with varying severity. She has also felt more fatigued than usual today. She has been tapering off vancomycin since October and is still currently taking this. She has had persistent diarrhea throughout this period. No black or bloody stools.  She denies dysuria but notes she is incontinence and gets frequent UTIs but has not had one in quite some time.  She notes intermittent vomiting but none today or within the last few weeks. She has been eating and drinking normally. No fevers or chills. She notes swollen feet, but this is not uncommon for her. She denies alcohol use.     Review of Systems   Constitutional: Positive for fatigue. Negative for appetite change, chills and fever.   Gastrointestinal: Positive for abdominal pain, diarrhea and nausea. Negative for blood in stool and vomiting.   All other systems reviewed and are negative.    Allergies:  Betamethasone  Petroleum Jelly   Aspirin  Bacitracin  Bactrim   Coumadin  Darvon  Dilaudid   Levaquin  Neomycin  Nitrofurantoin  Oxycodone  Tramadol  Vicodin  Xarelto  Adhesive Tape  Codeine  Hydrocortisone    Medications:  Colazal  Dificid   Glucotrol  Ditropan  Vancocin  Coumadin  Antivert    Past Medical " History:     Anemia  Atrial fibrillation  CAD  CHF  Fibromyalgia  GERD  Gout  Hiatal hernia  CRISTIANA  Morbid obesity  Hyperlipidemia   Diabetes  Ascending aorta dilatation  CKD stage III      Past Surgical History:    Appendectomy  Cholecystectomy  Colonoscopy  Coronary angiography  Cystoscopy and bladder biopsy  Left heart catheterization  DAJUAN  Left knee arthroplasty  Laparoscopic nissen fundoplication  Tonsillectomy  Ulnar nerve transposition      Family History:    Alzheimer's disease, mother  Lung cancer, father    Social History:  Presents to ED with daughter.  Denies alcohol use.     Physical Exam     Patient Vitals for the past 24 hrs:   BP Temp Temp src Pulse Resp SpO2   03/13/22 1910 -- -- -- 63 13 95 %   03/13/22 1900 (!) 121/90 98  F (36.7  C) Oral 64 18 95 %       Physical Exam  General: Large elderly female sitting upright  Eyes: PERRL, Conjunctive within normal limits.  No scleral icterus.  ENT: Dry mucous membranes, oropharynx clear.   CV: Normal S1S2, no murmur, rub or gallop.  Occasional irregularity.  Resp: Clear to auscultation bilaterally.  Normal respiratory effort.  GI: Abdomen is soft and nondistended.  Diffusely tender in the lower abdomen most prominent in left lower quadrant.  Voluntary guarding in the left lower quadrant.  No rebound.  No palpable mass.  Scarring noted over the lower ventral abdominal wall.  MSK: Bilateral 1+ pitting lower extremity edema at the ankles.  Nontender. Normal active range of motion.  Skin: Warm and dry. No concerning rashes or lesions or ecchymoses on visible skin.  Neuro: Alert and oriented. Responds appropriately to all questions and commands. No focal findings appreciated. Normal muscle tone.  Psych: Normal mood and affect. Pleasant.    Emergency Department Course   ECG  ECG obtained at 1918, ECG read at 1923  Atrial fibrillation  Low voltage QRS  Cannot rule out anterior infarct, age undetermined  Abnormal ECG   Rate 60 bpm. DE interval * ms. QRS duration 104  Is This A New Presentation, Or A Follow-Up?: Skin Lesion ms. QT/QTc 410/410 ms. P-R-T axes * -23 -14.     Imaging:  CT Abdomen Pelvis w Contrast   Final Result   IMPRESSION:    1. No obvious acute findings seen to explain patient's pain clinically. There are extensive findings of diverticulosis, but no evidence for diverticulitis. Prior postoperative change with no complications identified. Possible slight changes of cirrhosis    involving the liver. Ectatic, but nonaneurysmal 2.5 cm infrarenal abdominal aorta.        Report per radiology    Laboratory:  Labs Ordered and Resulted from Time of ED Arrival to Time of ED Departure   BASIC METABOLIC PANEL - Abnormal       Result Value    Sodium 138      Potassium 4.5      Chloride 103      Carbon Dioxide (CO2) 31      Anion Gap 4      Urea Nitrogen 14      Creatinine 1.02      Calcium 9.0      Glucose 130 (*)     GFR Estimate 56 (*)    INR - Abnormal    INR 2.59 (*)    ROUTINE UA WITH MICROSCOPIC REFLEX TO CULTURE - Abnormal    Color Urine Yellow      Appearance Urine Clear      Glucose Urine Negative      Bilirubin Urine Negative      Ketones Urine Negative      Specific Gravity Urine 1.030      Blood Urine Negative      pH Urine 6.0      Protein Albumin Urine Negative      Urobilinogen Urine Normal      Nitrite Urine Positive (*)     Leukocyte Esterase Urine Large (*)     Bacteria Urine Few (*)     Mucus Urine Present (*)     RBC Urine 2      WBC Urine 84 (*)     Squamous Epithelials Urine 2 (*)    ISTAT CREATININE POCT - Abnormal    Creatinine POCT 1.1 (*)     GFR, ESTIMATED POCT 51 (*)    CBC WITH PLATELETS AND DIFFERENTIAL    WBC Count 6.9      RBC Count 4.84      Hemoglobin 14.9      Hematocrit 45.5      MCV 94      MCH 30.8      MCHC 32.7      RDW 13.1      Platelet Count 188      % Neutrophils 66      % Lymphocytes 20      % Monocytes 10      % Eosinophils 3      % Basophils 1      % Immature Granulocytes 0      NRBCs per 100 WBC 0      Absolute Neutrophils 4.5      Absolute Lymphocytes 1.4      Absolute Monocytes 0.7       Absolute Eosinophils 0.2      Absolute Basophils 0.1      Absolute Immature Granulocytes 0.0      Absolute NRBCs 0.0     URINE CULTURE      Emergency Department Course:     Reviewed:  I reviewed nursing notes, vitals, past medical history and Care Everywhere.    Assessments:  1824 I obtained history and examined the patient as noted above.   2033 I rechecked the patient and explained findings. She is feeling improved.  She has taken p.o. without difficulty.  She feels comfortable plan for discharge home.  Her daughter who is an RN does as well.    Interventions:  1902 NS 1L, IV Bolus   2050 Tylenol 1000 mg, PO   Keflex 500 mg, PO    Disposition:  The patient was discharged to home.     Impression & Plan     Medical Decision Making:  This patient has symptoms of chronic diarrhea with new abdominal spasm that was persistent tonight.  Is localized over the lower abdomen.  Multiple etiologies were considered including diverticulitis, bowel obstruction, colitis, renal colic, UTI.  Urinalysis by straight cath is consistent with a urinary tract infection.  Urine culture is sent.  Review of past urine culture shows E. coli sensitive to Keflex.  She is given her first dose here.  There has been no fever, back pain, or flank pain.  She has chronic diarrhea which is not changed.  She is a history of C. difficile, but antibiotics are indicated in this clinical setting.  Fortunately are not broad-spectrum.  As there is no evidence of other acute intra-abdominal pathology as cause for her pain, it seems reasonable to discharge her home.  Her daughter is an RN and does help with her care and will be checking in on her.  The patient is recommended to return if increasing pain, vomiting, fever, or inability to tolerate the oral antibiotic.  Follow up with primary physician is indicated in 2-3 days.  All questions answered prior to discharge.  The patient feels comfortable with the plan.    Diagnosis:    ICD-10-CM    1. Abdominal  cramping  R10.9    2. Acute UTI  N39.0    3. Nausea  R11.0    4. Chronic diarrhea  K52.9      Discharge Medications:  New Prescriptions    CEPHALEXIN (KEFLEX) 500 MG CAPSULE    Take 1 capsule (500 mg) by mouth 4 times daily for 7 days     Scribe Disclosure:  I, Bonnie Moreira, am serving as a scribe at 6:21 PM on 3/13/2022 to document services personally performed by Kasey Hayes MD based on my observations and the provider's statements to me.      Kasey Hayes MD  03/13/22 2119

## 2022-03-13 NOTE — TELEPHONE ENCOUNTER
"Patient has cdiff.  Has had it since Oct.  Patient laid down from 1-3pm.  Woke up with terrible abdominal pain.  She feels like throwing up but hasnt.  She is having diarrhea but doesn't feel like it is all coming out.    Patient is on antibiotic.  Is on her third week of taking 1 per day for her cdiff.    Patient had a bigger lunch today because was planning on having a light dinner.  Had salmon and brendan slaw, not sure if it was something she ate.    Lower abdominal pain is stabbing and coming in waves.  Patient could barely talk.  Patient states pain is severe.  Patient feels a bit dizzy.  She states she doesn't think she is able to drink enough fluids.    Care advise is to go to the ED for evaluation.  Patients daughter will bring her to [facility] ED.    Roya Marie RN   03/13/22 4:59 PM  [facility] Nurse Advisor    Reason for Disposition    [1] SEVERE pain AND [2] age > 60    Additional Information    Negative: Shock suspected (e.g., cold/pale/clammy skin, too weak to stand, low BP, rapid pulse)    Negative: Difficult to awaken or acting confused (e.g., disoriented, slurred speech)    Negative: Passed out (i.e., lost consciousness, collapsed and was not responding)    Negative: Sounds like a life-threatening emergency to the triager    Negative: Chest pain    Negative: Pain is mainly in upper abdomen  (if needed ask: \"is it mainly above the belly button?\")    Negative: Followed an abdomen (stomach) injury    Negative: [1] Abdominal pain AND [2] pregnant < 20 weeks    Negative: [1] Abdominal pain AND [2] pregnant > 20 weeks    Negative: [1] Abdominal pain AND [2] postpartum (from 0 to 6 weeks after delivery)    Negative: [1] SEVERE pain (e.g., excruciating) AND [2] present > 1 hour    Protocols used: ABDOMINAL PAIN - FEMALE-A-AH      "

## 2022-03-14 ENCOUNTER — DOCUMENTATION ONLY (OUTPATIENT)
Dept: ANTICOAGULATION | Facility: CLINIC | Age: 82
End: 2022-03-14
Payer: COMMERCIAL

## 2022-03-14 ENCOUNTER — PATIENT OUTREACH (OUTPATIENT)
Dept: NURSING | Facility: CLINIC | Age: 82
End: 2022-03-14
Payer: COMMERCIAL

## 2022-03-14 DIAGNOSIS — I48.20 CHRONIC ATRIAL FIBRILLATION (H): ICD-10-CM

## 2022-03-14 DIAGNOSIS — Z79.01 LONG TERM (CURRENT) USE OF ANTICOAGULANTS: ICD-10-CM

## 2022-03-14 DIAGNOSIS — I48.21 PERMANENT ATRIAL FIBRILLATION (H): Primary | ICD-10-CM

## 2022-03-14 LAB
ATRIAL RATE - MUSE: 56 BPM
DIASTOLIC BLOOD PRESSURE - MUSE: NORMAL MMHG
INTERPRETATION ECG - MUSE: NORMAL
P AXIS - MUSE: NORMAL DEGREES
PR INTERVAL - MUSE: NORMAL MS
QRS DURATION - MUSE: 104 MS
QT - MUSE: 410 MS
QTC - MUSE: 410 MS
R AXIS - MUSE: -23 DEGREES
SYSTOLIC BLOOD PRESSURE - MUSE: NORMAL MMHG
T AXIS - MUSE: -14 DEGREES
VENTRICULAR RATE- MUSE: 60 BPM

## 2022-03-14 ASSESSMENT — ACTIVITIES OF DAILY LIVING (ADL): DEPENDENT_IADLS:: CLEANING

## 2022-03-14 NOTE — PROGRESS NOTES
Clinic Care Coordination Contact    Follow Up Progress Note      Assessment: RN CC called and spoke with patient. Savanna was seen at [facility] ED yesterday 3/13/22 for abdominal pain and generalized weakness. Patient was diagnosed with UTI and discharged on Keflex. Patient states that she is feeling better and her pain is improved since yesterday. Patient is scheduled to follow up with Infectious Disease provider on 3/16/22, her home care RN is also going to join appointment and patient is appreciative for collaboration of care. Patient is also scheduled to follow up with PCP on 3/22/2022.     Reviewed AVS, medication list and upcoming appointments. No new need for additional support or resources identified at this time.      Goals addressed this encounter:   Goals Addressed    None         Intervention/Education provided during outreach: Chronic disease management and support.      Outreach Frequency: monthly    Plan: [facility] will continue to reach out to patient monthly. RN Care Coordinator will review chart in 6 weeks.     Anjelica Berrios RN Care Coordinator  [facility]  Email: [email or web address]  Phone: [phone]

## 2022-03-14 NOTE — PROGRESS NOTES
Home care nurse Evelin calls back stating she will be out to see patient on 3/16/22 and will check INR then.

## 2022-03-14 NOTE — PROGRESS NOTES
ANTICOAGULATION  MANAGEMENT: Discharge Review    Savanna Rehman chart reviewed for anticoagulation continuity of care    Emergency room visit on 3/13/22 for UTI.    Discharge disposition: Home    Results:    Recent labs: (last 7 days)     03/08/22  1351 03/13/22  1854   INR 2.1* 2.59*     Anticoagulation inpatient management:     not applicable     Anticoagulation discharge instructions:     Warfarin dosing: home regimen continued   Bridging: No   INR goal change: No      Medication changes affecting anticoagulation: Yes: Patient prescribed cephalexin (3/13/22-3/20/22)    Additional factors affecting anticoagulation: No    Plan     Recommend to check INR on 3/15/22    Left a detailed message for home care nurse Evelin    Anticoagulation Calendar updated    Dorothy River RN

## 2022-03-14 NOTE — DISCHARGE INSTRUCTIONS
Discharge Instructions  Urinary Tract Infection  You or your child have been diagnosed with a urinary tract infection, or UTI. The urinary tract includes the kidneys (which make urine/pee), ureters (the tubes that carry urine/pee from the kidneys to the bladder), the bladder (which stores urine/pee), and urethra (the tube that carries urine/pee out of the bladder). Urinary tract infections occur when bacteria travel up the urethra into the bladder (bladder infection) and, in some cases, from there into the kidneys (kidney infection).  Generally, every Emergency Department visit should have a follow-up clinic visit with either a primary or a specialty clinic/provider. Please follow-up as instructed by your emergency provider today.  Return to the Emergency Department if:  You or your child have severe back pain.  You or your child are vomiting (throwing up) so that you cannot take your medicine.  You or your child have a new fever (had not previously had a fever) over 101 F.  You or your child have confusion or are very weak, or feel very ill.  Your child seems much more ill, will not wake up, will not respond right, or is crying for a long time and will not calm down.  You or your child are showing signs of dehydration. These signs may include decreased urination (pee), dry mouth/gums/tongue, or decreased activity.    Follow-up with your provider:   Children under 24 months need to be seen by their regular provider within one week after a diagnosis of a UTI. It may be necessary to do some more tests to look at the child s kidney or bladder.  You should begin to feel better within 24 - 48 hours of starting your antibiotic; follow-up with your regular clinic/doctor/provider if this is not the case.    Treatment:   You will be treated with an antibiotic to kill the bacteria. We have to make an educated guess, based on what we know about common bacteria and antibiotics, as to which antibiotic will work for your  "infection. We will be correct most times but there will be some cases where the antibiotic chosen is not correct (see urine cultures below).  Take a pain medication such as acetaminophen (Tylenol ) or ibuprofen (Advil , Motrin , Nuprin ).  Phenazopyridine (Pyridium , Uristat ) is a prescription medication that numbs the bladder to reduce the burning pain of some UTIs.  The same medication is available in a non-prescription version (Azo-Standard , Urodol ). This medication will change the color of the urine and tears (usually blue or orange). If you wear contacts, do not wear them while taking this medication as they may be stained by the medication.    Urine Cultures:  If indicated, a urine culture may have been performed today. This test generally takes 24-48 hours to complete so the results are not known at this time. The results can confirm that an infection is present but also determine which antibiotic is effective for the specific bacteria that is causing the infection. If your urine culture shows that the antibiotic you were given today will not work to treat your infection, we will attempt to contact you to make arrangements to change the antibiotic. If the culture confirms that the antibiotic is effective for your infection, you will not be contacted. We often recommend follow-up with your regular physician/provider on the culture results regardless of this process.    Antibiotic Warning:   If you have been placed on antibiotics - watch for signs of allergic reaction.  These include rash, lip swelling, difficulty breathing, wheezing, and dizziness.  If you develop any of these symptoms, stop the antibiotic immediately and go to an emergency room or urgent care for evaluation.    Probiotics: If you have been given an antibiotic, you may want to also take a probiotic pill or eat yogurt with live cultures. Probiotics have \"good bacteria\" to help your intestines stay healthy. Studies have shown that probiotics " help prevent diarrhea and other intestine problems (including C. diff infection) when you take antibiotics. You can buy these without a prescription in the pharmacy section of the store.   If you were given a prescription for medicine here today, be sure to read all of the information (including the package insert) that comes with your prescription.  This will include important information about the medicine, its side effects, and any warnings that you need to know about.  The pharmacist who fills the prescription can provide more information and answer questions you may have about the medicine.  If you have questions or concerns that the pharmacist cannot address, please call or return to the Emergency Department.   Remember that you can always come back to the Emergency Department if you are not able to see your regular provider in the amount of time listed above, if you get any new symptoms, or if there is anything that worries you.     Discharge Instructions  Abdominal Pain    Abdominal pain (belly pain) can be caused by many things. Your evaluation today does not show the exact cause for your pain. Your provider today has decided that it is unlikely your pain is due to a life threatening problem, or a problem requiring surgery or hospital admission. Sometimes those problems cannot be found right away, so it is very important that you follow up as directed.  Sometimes only the changes which occur over time allow the cause of your pain to be found.    Generally, every Emergency Department visit should have a follow-up clinic visit with either a primary or a specialty clinic/provider. Please follow-up as instructed by your emergency provider today. With abdominal pain, we often recommend very close follow-up, such as the following day.    ADULTS:  Return to the Emergency Department right away if:    You get an oral temperature above 102oF or as directed by your provider.  You have blood in your stools. This may be  bright red or appear as black, tarry stools.    You keep vomiting (throwing up) or cannot drink liquids.  You see blood when you vomit.   You cannot have a bowel movement or you cannot pass gas.  Your stomach gets bloated or bigger.  Your skin or the whites of your eyes look yellow.  You faint.  You have bloody, frequent or painful urination (peeing).  You have new symptoms or anything that worries you.    CHILDREN:  Return to the Emergency Department right away if your child has any of the above-listed symptoms or the following:    Pushes your hand away or screams/cries when his/her belly is touched.  You notice your child is very fussy or weak.  Your child is very tired and is too tired to eat or drink.  Your child is dehydrated.  Signs of dehydration can be:  Significant change in the amount of wet diapers/urine.  Your infant or child starts to have dry mouth and lips, or no saliva (spit) or tears.    PREGNANT WOMEN:  Return to the Emergency Department right away if you have any of the above-listed symptoms or the following:    You have bleeding, leaking fluid or passing tissue from the vagina.  You have worse pain or cramping, or pain in your shoulder or back.  You have vomiting that will not stop.  You have a temperature of 100oF or more.  Your baby is not moving as much as usual.  You faint.  You get a bad headache with or without eye problems and abdominal pain.  You have a seizure.  You have unusual discharge from your vagina and abdominal pain.    Abdominal pain is pretty common during pregnancy.  Your pain may or may not be related to your pregnancy. You should follow-up closely with your OB provider so they can evaluate you and your baby.  Until you follow-up with your regular provider, do the following:     Avoid sex and do not put anything in your vagina.  Drink clear fluids.  Only take medications approved by your provider.    MORE INFORMATION:    Appendicitis:  A possible cause of abdominal pain in  "any person who still has their appendix is acute appendicitis. Appendicitis is often hard to diagnose.  Testing does not always rule out early appendicitis or other causes of abdominal pain. Close follow-up with your provider and re-evaluations may be needed to figure out the reason for your abdominal pain.    Follow-up:  It is very important that you make an appointment with your clinic and go to the appointment.  If you do not follow-up with your primary provider, it may result in missing an important development which could result in permanent injury or disability and/or lasting pain.  If there is any problem keeping your appointment, call your provider or return to the Emergency Department.    Medications:  Take your medications as directed by your provider today.  Before using over-the-counter medications, ask your provider and make sure to take the medications as directed.  If you have any questions about medications, ask your provider.    Diet:  Resume your normal diet as much as possible, but do not eat fried, fatty or spicy foods while you have pain.  Do not drink alcohol or have caffeine.  Do not smoke tobacco.    Probiotics: If you have been given an antibiotic, you may want to also take a probiotic pill or eat yogurt with live cultures. Probiotics have \"good bacteria\" to help your intestines stay healthy. Studies have shown that probiotics help prevent diarrhea (loose stools) and other intestine problems (including C. diff infection) when you take antibiotics. You can buy these without a prescription in the pharmacy section of the store.     If you were given a prescription for medicine here today, be sure to read all of the information (including the package insert) that comes with your prescription.  This will include important information about the medicine, its side effects, and any warnings that you need to know about.  The pharmacist who fills the prescription can provide more information and " answer questions you may have about the medicine.  If you have questions or concerns that the pharmacist cannot address, please call or return to the Emergency Department.       Remember that you can always come back to the Emergency Department if you are not able to see your regular provider in the amount of time listed above, if you get any new symptoms, or if there is anything that worries you.

## 2022-03-15 LAB — BACTERIA UR CULT: NORMAL

## 2022-03-16 ENCOUNTER — TELEPHONE (OUTPATIENT)
Dept: INTERNAL MEDICINE | Facility: CLINIC | Age: 82
End: 2022-03-16
Payer: COMMERCIAL

## 2022-03-16 ENCOUNTER — ANTICOAGULATION THERAPY VISIT (OUTPATIENT)
Dept: ANTICOAGULATION | Facility: CLINIC | Age: 82
End: 2022-03-16
Payer: COMMERCIAL

## 2022-03-16 ENCOUNTER — TRANSFERRED RECORDS (OUTPATIENT)
Dept: HEALTH INFORMATION MANAGEMENT | Facility: CLINIC | Age: 82
End: 2022-03-16
Payer: COMMERCIAL

## 2022-03-16 DIAGNOSIS — Z79.01 LONG TERM (CURRENT) USE OF ANTICOAGULANTS: ICD-10-CM

## 2022-03-16 DIAGNOSIS — I48.20 CHRONIC ATRIAL FIBRILLATION (H): ICD-10-CM

## 2022-03-16 DIAGNOSIS — I48.21 PERMANENT ATRIAL FIBRILLATION (H): Primary | ICD-10-CM

## 2022-03-16 DIAGNOSIS — A04.72 C. DIFFICILE COLITIS: Primary | ICD-10-CM

## 2022-03-16 LAB — INR (EXTERNAL): 2.6 (ref 0.9–1.1)

## 2022-03-16 RX ORDER — WARFARIN SODIUM 2.5 MG/1
TABLET ORAL
Qty: 126 TABLET | Refills: 1 | Status: SHIPPED | OUTPATIENT
Start: 2022-03-16 | End: 2022-10-06

## 2022-03-16 NOTE — TELEPHONE ENCOUNTER
Pending Prescriptions:                       Disp   Refills    warfarin ANTICOAGULANT (COUMADIN) 2.5 MG t*126 ta*3        Sig: TAKE ONE AND ONE-HALF TABLETS ( 3.75 MG ) EVERY           WEDNESDAY AND TAKE 1 TABLET ALL OTHER DAYS OF THE           WEEK OR AS DIRECTED BY YOUR PROVIDER    Roxy LOREDO RN   [facility]

## 2022-03-16 NOTE — TELEPHONE ENCOUNTER
[facility] because the patient is currently taking cephalexin 500mg for a UTI and so the infectious disease doctor had suggested the patient increase her vancomycin by taking it twice daily while on the antibiotic instead of once daily. They want to make sure Dr Lauren knows this and is ok with this. However if the patient does this she will be short about five pills of vancomycin.    Evelin [phone].

## 2022-03-17 ENCOUNTER — DOCUMENTATION ONLY (OUTPATIENT)
Dept: SLEEP MEDICINE | Facility: CLINIC | Age: 82
End: 2022-03-17
Payer: COMMERCIAL

## 2022-03-17 NOTE — PROGRESS NOTES
Patient came to [facility] for mask fitting appointment on March 17, 2022. Patient requested to switch masks because poor seal/leak. Discussed the following masks: AIRTOUCH N20, AIRFIT N30I, ESON 2. Patient selected a Resmed Mask name: AIRTOUCH N20 Nasal mask size Medium

## 2022-03-17 NOTE — TELEPHONE ENCOUNTER
Spoke with patient's home care nurse Raj, advised her that per MD it is fine if patient increases her Vancomycin to twice daily while on Cephalexin for UTI.  Patient does have enough Vanco to see her through to her next appt with Dr. Lauren 3/22/22.  Called [facility], they are faxing over visit note from 3/16/22.  The visit should be viewable in Care Everywhere under [facility] name.  BENITA Graham R.N.

## 2022-03-17 NOTE — TELEPHONE ENCOUNTER
I am ok w increasing Vanco dose   I do not see any ID note in the chart  Pt has appointment March 22.  Does she have enough Vanco until quang ?

## 2022-03-18 PROBLEM — A04.72 C. DIFFICILE COLITIS: Status: ACTIVE | Noted: 2022-03-18

## 2022-03-21 ENCOUNTER — TELEPHONE (OUTPATIENT)
Dept: INTERNAL MEDICINE | Facility: CLINIC | Age: 82
End: 2022-03-21
Payer: COMMERCIAL

## 2022-03-21 DIAGNOSIS — E11.9 DIABETES MELLITUS, TYPE 2 (H): Primary | ICD-10-CM

## 2022-03-21 NOTE — TELEPHONE ENCOUNTER
Patient calls to request an RX for the Amol Lancing device be sent to [facility] in [place].  Patient is using her old meter and will not be able to use when her nurse comes tomorrow if not sent.  Please send today if possible.

## 2022-03-22 ENCOUNTER — TELEPHONE (OUTPATIENT)
Dept: INTERNAL MEDICINE | Facility: CLINIC | Age: 82
End: 2022-03-22

## 2022-03-22 ENCOUNTER — VIRTUAL VISIT (OUTPATIENT)
Dept: INTERNAL MEDICINE | Facility: CLINIC | Age: 82
End: 2022-03-22
Payer: COMMERCIAL

## 2022-03-22 ENCOUNTER — ANTICOAGULATION THERAPY VISIT (OUTPATIENT)
Dept: ANTICOAGULATION | Facility: CLINIC | Age: 82
End: 2022-03-22

## 2022-03-22 DIAGNOSIS — I48.20 CHRONIC ATRIAL FIBRILLATION (H): ICD-10-CM

## 2022-03-22 DIAGNOSIS — I48.21 PERMANENT ATRIAL FIBRILLATION (H): Primary | ICD-10-CM

## 2022-03-22 DIAGNOSIS — Z11.59 NEED FOR HEPATITIS C SCREENING TEST: ICD-10-CM

## 2022-03-22 DIAGNOSIS — A04.72 C. DIFFICILE COLITIS: Primary | ICD-10-CM

## 2022-03-22 DIAGNOSIS — Z79.01 LONG TERM (CURRENT) USE OF ANTICOAGULANTS: ICD-10-CM

## 2022-03-22 DIAGNOSIS — Z13.220 SCREENING FOR HYPERLIPIDEMIA: ICD-10-CM

## 2022-03-22 LAB — INR (EXTERNAL): 2.2 (ref 0.9–1.1)

## 2022-03-22 PROCEDURE — 99442 PR PHYSICIAN TELEPHONE EVALUATION 11-20 MIN: CPT | Mod: 95 | Performed by: INTERNAL MEDICINE

## 2022-03-22 RX ORDER — VANCOMYCIN HYDROCHLORIDE 125 MG/1
CAPSULE ORAL
Qty: 30 CAPSULE | Refills: 0 | Status: SHIPPED | OUTPATIENT
Start: 2022-03-22 | End: 2022-06-02

## 2022-03-22 RX ORDER — LANCING DEVICE
EACH MISCELLANEOUS
Qty: 1 EACH | Refills: 0 | Status: SHIPPED | OUTPATIENT
Start: 2022-03-22 | End: 2022-04-13

## 2022-03-22 NOTE — TELEPHONE ENCOUNTER
Prescription approved per [facility] [place] Refill Protocol. BENITA Graham R.N.

## 2022-03-22 NOTE — PROGRESS NOTES
Savanna is a 81 year old who is being evaluated via a billable telephone visit.      What phone number would you like to be contacted at? [phone]  How would you like to obtain your AVS? Adam[facility]      This is a telephone encounter with the patient.       16:40 --- 16:56          Dr Lauren's note      Patient's instructions / PLAN:                                                        Plan:  1. Vancomycin  125 mg orally once daily for 7 days, then 125 mg orally every 2 days for 4 weeks, then 125 mg orally every  3 days for 4 weeks        ASSESSMENT & PLAN:                                                      (A04.72) C. difficile colitis  (primary encounter diagnosis)  Comment: recurr but little better   Plan: vancomycin (VANCOCIN) 125 MG capsule      Chief complaint:                                                      C Diff     SUBJECTIVE:                                                    History of present illness:    She tells me she was at the hosp this last week. The epic shows she was in ER March 13.   She was treated for UTI. The ID told her she didn't the Abx for the urine abnormality.     I reviewed the report from ID that she needs Vanco 125 mg bid when she takes a different Abx        Review of Systems:                                                      ROS: negative for fever, chills, cough, wheezes, chest pain, shortness of breath, vomiting, abdominal pain, leg swelling          OBJECTIVE:           An actual physical exam can't be done during phone visit       PMHx: reviewed  Past Medical History:   Diagnosis Date     Anemia     Iron Deficiency anemia     Atrial fibrillation (H)      CAD (coronary artery disease)     non-obstructive     Chronic pain     neck, low back, legs     Congestive heart failure (H)      Degenerative disk disease      Fibromyalgia      Gastro-oesophageal reflux disease      Gout      Hiatal hernia      Mumps      Neuropathy      CRISTIANA (obstructive sleep apnea) - CPAP       Palpitations      Pernicious anemia      Sleep apnea     uses CPAP.     Urinary incontinence      Vitamin D deficiency       PSHx: reviewed  Past Surgical History:   Procedure Laterality Date     APPENDECTOMY       CHOLECYSTECTOMY       COLONOSCOPY  3/15/2011     CORONARY ANGIOGRAPHY ADULT ORDER       CYSTOSCOPY, BIOPSY BLADDER, COMBINED N/A 7/13/2020    Procedure: CYSTOSCOPY, WITH BLADDER BIOPSY;  Surgeon: Deisy Wilson MD;  Location: [facility] OR     HEART CATH LEFT HEART CATH  12/30/16    medication management     HYSTERECTOMY TOTAL ABDOMINAL       Knee replacement NOS Left      LAPAROSCOPIC NISSEN FUNDOPLICATION N/A 2/4/2015    Procedure: LAPAROSCOPIC NISSEN FUNDOPLICATION;  Surgeon: Armando Ansari MD;  Location: [facility] OR     TONSILLECTOMY       TRANSPOSITION ULNAR NERVE (ELBOW)          Meds: reviewed  Current Outpatient Medications   Medication Sig Dispense Refill     ACE/ARB/ARNI NOT PRESCRIBED (INTENTIONAL) Please choose reason not prescribed, below       alcohol swab prep pads Use to swab area of injection/chandrakant as directed. 100 each 3     B Complex-C (SUPER B COMPLEX PO) Take 1 tablet by mouth At Bedtime       balsalazide (COLAZAL) 750 MG capsule Take 2,250 mg by mouth 3 times daily       Biotin 5000 MCG TABS Take 5,000 mcg by mouth At Bedtime       blood glucose (NO BRAND SPECIFIED) lancets standard Use to test blood sugar 1 time daily 100 lancet 3     blood glucose (NO BRAND SPECIFIED) lancets standard Use to test blood sugar  as directed. 1 each 0     blood glucose (NO BRAND SPECIFIED) lancing device Device to be used with lancets. Patient requests Amol lancing device to check blood glucose once per day. 1 each 0     blood glucose calibration (NO BRAND SPECIFIED) solution Use to calibrate blood glucose monitor 1 Bottle 3     blood glucose monitoring (NO BRAND SPECIFIED) meter device kit Use to test blood sugar 1 times daily or as directed. 1 kit 1     blood glucose monitoring (NO BRAND SPECIFIED)  meter device kit Use to test blood sugar 1 time daily 1 kit 0     butalbital-aspirin-caffeine (FIORINAL) -40 MG capsule Take 1 capsule by mouth every 6 hours as needed for headaches 30 capsule 0     cholecalciferol (VITAMIN D3) 5000 units (125 mcg) capsule Take 5,000 Units by mouth At Bedtime       EPINEPHrine (EPIPEN 2-ROBBY) 0.3 MG/0.3ML injection 2-pack Inject 0.3 mLs (0.3 mg) into the muscle once as needed for anaphylaxis 1 each 1     estradiol (ESTRACE) 0.1 MG/GM vaginal cream Please use your finger to place a large blueberry size amount in the vagina nightly for two weeks and then three times a week at night thereafter 42.5 g 3     fidaxomicin (DIFICID) 200 MG tablet Take 1 tablet (200 mg) by mouth 2 times daily 20 tablet 0     glipiZIDE (GLUCOTROL XL) 2.5 MG 24 hr tablet Take 1 tablet (2.5 mg) by mouth 2 times daily 180 tablet 0     meclizine (ANTIVERT) 12.5 MG tablet TAKE 1 TABLET BY MOUTH THREE TIMES DAILY AS NEEDED FOR DIZZINESS 30 tablet 0     nystatin (MYCOSTATIN) 673582 UNIT/GM external powder Apply to bilateral groin area 2x daily as needed. 30 g 1     ondansetron (ZOFRAN) 8 MG tablet Take 1 tablet (8 mg) by mouth every 8 hours as needed for nausea 30 tablet 0     ONETOUCH ULTRA test strip USE 1 STRIP TO CHECK GLUCOSE ONCE DAILY 100 strip 11     order for DME Oxygen 2 Li/min  at night bleed with CPAP       oxybutynin (DITROPAN) 5 MG tablet TAKE 1 TABLET TWICE A DAY 90 tablet 4     vancomycin (VANCOCIN) 125 MG capsule Take 125 mg daily for 4 weeks, then 125 mg every 2 days for 4 weeks, then 125 mg every 3 days for 4 weeks 54 capsule 0     vancomycin (VANCOCIN) 125 MG capsule Vancomycin 125 mg orally 4 times daily for 14 days, then 125 mg orally 2 times daily for 7 days, then 125 mg orally once daily for 7 days, then 125 mg orally every 2 days for 4 weeks, then 125 mg orally every  3 days for 4 weeks 100 capsule 0     warfarin ANTICOAGULANT (COUMADIN) 2.5 MG tablet TAKE ONE AND ONE-HALF TABLETS (  3.75 MG ) EVERY Monday, Wednesday, Friday  AND TAKE 1 TABLET ALL OTHER DAYS OF THE WEEK OR AS DIRECTED BY [facility] clinic 126 tablet 1       Soc Hx: reviewed  Fam Hx: reviewed          Patti Lauren MD  Internal Medicine

## 2022-03-22 NOTE — PROGRESS NOTES
ANTICOAGULATION MANAGEMENT     Savanna Rehman 81 year old female is on warfarin with therapeutic INR result. (Goal INR 2.0-3.0)    Recent labs: (last 7 days)     03/22/22  1247   INR 2.2*       ASSESSMENT       Source(s): Chart Review and Home Care/Facility Nurse       Warfarin doses taken: Warfarin taken as instructed    Diet: No new diet changes identified    New illness, injury, or hospitalization: No    Medication/supplement changes: last day of keflex for UTI is today (10 days total)    Signs or symptoms of bleeding or clotting: No    Previous INR: Therapeutic last 2(+) visits    Additional findings: None       PLAN     Recommended plan for no diet, medication or health factor changes affecting INR     Dosing Instructions: Continue your current warfarin dose with next INR in 1 week       Summary  As of 3/22/2022    Full warfarin instructions:  3.75 mg every Mon, Wed, Fri; 2.5 mg all other days   Next INR check:  3/29/2022             Telephone call with Savanna who verbalizes understanding and agrees to plan    Orders given to  Homecare nurse/facility to recheck    Education provided: Goal range and significance of current result    Plan made per [facility] anticoagulation protocol    Sahra Ferrari, RN  Anticoagulation Clinic  3/22/2022    _______________________________________________________________________     Anticoagulation Episode Summary     Current INR goal:  2.0-3.0   TTR:  72.9 % (1 y)   Target end date:  Indefinite   Send INR reminders to:  NIKKI CHASE    Indications    Permanent atrial fibrillation (H) [I48.21]  Chronic atrial fibrillation (H) [I48.20]  Long term (current) use of anticoagulants [Z79.01]           Comments:  Home care          Anticoagulation Care Providers     Provider Role Specialty Phone number    Patti Suárez MD Referring Internal Medicine [phone]

## 2022-03-23 ENCOUNTER — MEDICAL CORRESPONDENCE (OUTPATIENT)
Dept: HEALTH INFORMATION MANAGEMENT | Facility: CLINIC | Age: 82
End: 2022-03-23

## 2022-03-23 DIAGNOSIS — Z53.9 DIAGNOSIS NOT YET DEFINED: Primary | ICD-10-CM

## 2022-03-23 PROCEDURE — 99207 PR MD RECERTIFICATION HHA PT: CPT | Performed by: INTERNAL MEDICINE

## 2022-03-31 ENCOUNTER — ANTICOAGULATION THERAPY VISIT (OUTPATIENT)
Dept: ANTICOAGULATION | Facility: CLINIC | Age: 82
End: 2022-03-31
Payer: COMMERCIAL

## 2022-03-31 DIAGNOSIS — Z79.01 LONG TERM (CURRENT) USE OF ANTICOAGULANTS: ICD-10-CM

## 2022-03-31 DIAGNOSIS — I48.21 PERMANENT ATRIAL FIBRILLATION (H): Primary | ICD-10-CM

## 2022-03-31 DIAGNOSIS — I48.20 CHRONIC ATRIAL FIBRILLATION (H): ICD-10-CM

## 2022-03-31 LAB — INR (EXTERNAL): 2.6 (ref 0.9–1.1)

## 2022-03-31 NOTE — PROGRESS NOTES
ANTICOAGULATION MANAGEMENT     Savanna Rehman 81 year old female is on warfarin with therapeutic INR result. (Goal INR 2.0-3.0)    Recent labs: (last 7 days)     03/31/22  1213   INR 2.6*       ASSESSMENT       Source(s): Chart Review and Home Care/Facility Nurse       Warfarin doses taken: Warfarin taken as instructed    Diet: No new diet changes identified    New illness, injury, or hospitalization: No    Medication/supplement changes: None noted    Signs or symptoms of bleeding or clotting: No    Previous INR: Therapeutic last 2(+) visits    Additional findings: None       PLAN     Recommended plan for no diet, medication or health factor changes affecting INR     Dosing Instructions: continue your current warfarin dose with next INR in 2 weeks       Summary  As of 3/31/2022    Full warfarin instructions:  3.75 mg every Mon, Wed, Fri; 2.5 mg all other days   Next INR check:  4/12/2022             Telephone call with Raj home care/facility nurse who verbalizes understanding and agrees to plan    Orders given to  Homecare nurse/facility to recheck    Education provided: None required    Plan made per [facility] anticoagulation protocol    Mahsa Scott RN  Anticoagulation Clinic  3/31/2022    _______________________________________________________________________     Anticoagulation Episode Summary     Current INR goal:  2.0-3.0   TTR:  74.7 % (1 y)   Target end date:  Indefinite   Send INR reminders to:  NIKKI CHASE    Indications    Permanent atrial fibrillation (H) [I48.21]  Chronic atrial fibrillation (H) [I48.20]  Long term (current) use of anticoagulants [Z79.01]           Comments:  Home care          Anticoagulation Care Providers     Provider Role Specialty Phone number    Patti Suárez MD Referring Internal Medicine [phone]

## 2022-04-12 ENCOUNTER — ANTICOAGULATION THERAPY VISIT (OUTPATIENT)
Dept: ANTICOAGULATION | Facility: CLINIC | Age: 82
End: 2022-04-12
Payer: COMMERCIAL

## 2022-04-12 DIAGNOSIS — Z79.01 LONG TERM (CURRENT) USE OF ANTICOAGULANTS: ICD-10-CM

## 2022-04-12 DIAGNOSIS — I48.20 CHRONIC ATRIAL FIBRILLATION (H): ICD-10-CM

## 2022-04-12 DIAGNOSIS — I48.21 PERMANENT ATRIAL FIBRILLATION (H): Primary | ICD-10-CM

## 2022-04-12 LAB — INR (EXTERNAL): 3 (ref 0.9–1.1)

## 2022-04-12 NOTE — PROGRESS NOTES
ANTICOAGULATION MANAGEMENT     Savanna Rehman 81 year old female is on warfarin with therapeutic INR result. (Goal INR 2.0-3.0)    Recent labs: (last 7 days)     04/12/22  1136   INR 3.0*       ASSESSMENT       Source(s): Chart Review and Home Care/Facility Nurse       Warfarin doses taken: Warfarin taken as instructed    Diet: Change in alcohol intake may be affecting INR. 1 drink last evening     New illness, injury, or hospitalization: No    Medication/supplement changes: None noted    Signs or symptoms of bleeding or clotting: No    Previous INR: Therapeutic last 2(+) visits    Additional findings: None       PLAN     Recommended plan for temporary change(s) affecting INR     Dosing Instructions: continue your current warfarin dose with next INR in 2 weeks       Summary  As of 4/12/2022    Full warfarin instructions:  3.75 mg every Mon, Wed, Fri; 2.5 mg all other days   Next INR check:  4/26/2022             Telephone call with Raj home care/facility nurse who verbalizes understanding and agrees to plan    Orders given to  Homecare nurse/facility to recheck    Education provided: Please call back if any changes to your diet, medications or how you've been taking warfarin and Potential interaction between warfarin and alcohol    Plan made per [facility] anticoagulation protocol    Azul Whitaker, RN  Anticoagulation Clinic  4/12/2022    _______________________________________________________________________     Anticoagulation Episode Summary     Current INR goal:  2.0-3.0   TTR:  77.3 % (1 y)   Target end date:  Indefinite   Send INR reminders to:  EDISONAG KASKRISHNA    Indications    Permanent atrial fibrillation (H) [I48.21]  Chronic atrial fibrillation (H) [I48.20]  Long term (current) use of anticoagulants [Z79.01]           Comments:  Home care          Anticoagulation Care Providers     Provider Role Specialty Phone number    Patti Suárez MD Referring Internal Medicine [phone]

## 2022-04-13 ENCOUNTER — TELEPHONE (OUTPATIENT)
Dept: INTERNAL MEDICINE | Facility: CLINIC | Age: 82
End: 2022-04-13
Payer: COMMERCIAL

## 2022-04-13 ENCOUNTER — PATIENT OUTREACH (OUTPATIENT)
Dept: NURSING | Facility: CLINIC | Age: 82
End: 2022-04-13
Payer: COMMERCIAL

## 2022-04-13 DIAGNOSIS — E11.9 DIABETES MELLITUS, TYPE 2 (H): ICD-10-CM

## 2022-04-13 RX ORDER — LANCING DEVICE
EACH MISCELLANEOUS
Qty: 1 EACH | Refills: 0 | Status: SHIPPED | OUTPATIENT
Start: 2022-04-13 | End: 2024-01-12

## 2022-04-13 NOTE — TELEPHONE ENCOUNTER
Patient calls, she received a new Microlet Next lancing device, but this is too complicated for her to use and she is having to wait for either her home nurse or her daughter who is a nurse to check her blood sugar for her. She wants to get a prescription for the device she used before - unsure of the name, but it different lancets that do not fit into the new device. Patient checked the box of old lancets she used and they are called Microlet colored lancets. She states her old lancing device had a grey tip and the rest of it was white and light blue. The new device is black and white and looks like a tube. Informed patient that Microlet is a [employer] product and possible that they have changed the design of the lancing device.      Based on search, this sounds like she was previously using Microlet 2 lancet. According to [employer] web-site, they only have the Microlet Next lancing device available. This Writer then received message that patient spoke to [facility] in [place] and thinks they have the lancing device she is looking for. Requesting prescription be sent to them.     Order for Microlet 2 lancing device sent to [facility]. Patient informed prescription sent to the pharmacy, but she understand this may have been discontinued.     Mahsa Arteaga RN  [facility]

## 2022-04-13 NOTE — PROGRESS NOTES
Clinic Care Coordination Contact    Community Health Worker Follow Up    Care Gaps:     Health Maintenance Due   Topic Date Due     HF ACTION PLAN  Never done     HEPATITIS C SCREENING  Never done     ZOSTER IMMUNIZATION (1 of 2) Never done     LUNG CANCER SCREENING  Never done     EYE EXAM  02/01/2018     MEDICARE ANNUAL WELLNESS VISIT  11/30/2021     DIABETIC FOOT EXAM  11/30/2021     COVID-19 Vaccine (4 - Booster for Pfizer series) 02/11/2022     LIPID  02/22/2022     A1C  04/07/2022       Patient was focused on addressing current goals.     Goals:    Goals Addressed as of 4/13/2022 at 3:01 PM                    Today    2/28/22       1. Transportation (pt-stated)   On Hold  80%    Added 1/27/22 by Nichol Berrios RN      Goal Statement: I would like alternate transportation resources.   Date Goal set: 1/27/22  Barriers: Limited income  Strengths: Patient is motivated and engaged  Date to Achieve By: 7/1/22  Patient expressed understanding of goal: Yes  Action steps to achieve this goal:  1. I understand that RN CC will mail alternate transportation resources to me.   2. I will review resources and utilize as I see fit.   3. I will continue to work with care coordination for any additional resources and support.     4-13, CHW:    Patient is deciding to go through with [employer] and that their  was able to help them resubmit their paperwork to be approved.     Patient did not utilize [employer] like previously discussed. Further, patient stated that [employer] was not a good fit for her financially.               Intervention and Education during outreach: Patient requested to stop CC outreaches. Writer inquired if the patient would like a check in in 2 months, however patient declined.     CHW Plan: Will route patient's chart to lead CC to be reviewed for potential disenrollment.     KEHINDE Barillas. Public Health  Community Health Worker  [facility] [place] [facility]  [facility] Coordination  [phone]  --------------------------------------------------------------  Next outreach:   Preferred contact: [phone]     Enrollment: 1/27/2022  Last Assessment: 1/27/2022  Frequency: Monthly

## 2022-04-14 ENCOUNTER — PATIENT OUTREACH (OUTPATIENT)
Dept: CARE COORDINATION | Facility: CLINIC | Age: 82
End: 2022-04-14
Payer: COMMERCIAL

## 2022-04-14 ASSESSMENT — ACTIVITIES OF DAILY LIVING (ADL): DEPENDENT_IADLS:: CLEANING

## 2022-04-14 NOTE — PROGRESS NOTES
Clinic Care Coordination Contact    Assessment: Care Coordinator contacted patient for follow up 4/13/22.  Patient has continued to follow the plan of care and assessment is negative for any new needs or concerns. Patient wishes to graduate from  at this time.     Enrollment status: Graduated.      Plan: No further outreaches at this time.  Patient will continue to follow the plan of care.  If new needs arise a new Care Coordination referral may be placed.  FYI to PCP    Anjelica Berrios RN Care Coordinator  [facility]  Email: [email or web address]  Phone: [phone]       MED/SURG

## 2022-04-14 NOTE — LETTER
[facility] CARE COORDINATION  [address] [facility] [address]    April 14, 2022    Savanna Rehman  [address]   [facility] [facility] [address]    Dear Savanna,  Your Care Team congratulates you on your journey to maintain wellness. This document will help guide you on your journey to maintain a healthy lifestyle.  You can use this to help you overcome any barriers you may encounter.  If you should have any questions or concerns, you can contact the members of your Care Team or contact your Primary Care Clinic for assistance.     Health Maintenance  Health Maintenance Reviewed:      My Access Plan  Medical Emergency 911   Primary Clinic Line [facility] - [phone]   24 Hour Appointment Line [phone] or  [phone]) (toll-free)   24 Hour Nurse Line [phone] (toll-free)   Preferred Urgent Care     Preferred Hospital [facility]  [phone]   Preferred Pharmacy [facility] [address] [facility] [address] Behavioral Health Crisis Line The National Suicide Prevention Lifeline at 1-767.189.8442 or 911     My Care Team Members  Patient Care Team       Relationship Specialty Notifications Start End    Patti Suárez MD PCP - General Internal Medicine  10/23/19     Phone: [phone] Pager: [phone] Fax: [phone]        [address] [facility] [address]    Patti Suárez MD Assigned PCP   9/27/19     Phone: [phone] Pager: [phone] Fax: [phone]        [address] [facility] [address]    Patti Suárez MD Referring Physician Internal Medicine  6/30/20     Phone: [phone] Pager: [phone] Fax: [phone]        [address] [facility] [address]    Deisy Wilson MD MD Urology  6/30/20     Phone: [phone] Fax: [phone]         [address] [facility] [address] [facility] [address]     Adelaida Liu, RN Specialty Care Coordinator Urology  6/30/20     Phone: [phone] Pager: [phone]        Deisy Wilson MD Assigned Surgical Provider   10/23/20     Phone: [phone] Fax: [phone]         [address]  [facility] [address]    Mohan Kenny MD Assigned Heart and Vascular Provider   4/25/21     Phone: [phone] Pager: [phone] Fax: [phone] [address] [place] [address]    Anjali Rabago MD Assigned Pulmonology Provider   2/13/22     Phone: [phone] Fax: [phone]         [address] [facility] [address]    Jeannette Modi MD Assigned Sleep Provider   3/13/22     Phone: [phone] Fax: [phone]         [address] [facility] [address]               Goals        COMPLETED: 1. Transportation (pt-stated)       Goal Statement: I would like alternate transportation resources.   Date Goal set: 1/27/22  Barriers: Limited income  Strengths: Patient is motivated and engaged  Date to Achieve By: 7/1/22  Patient expressed understanding of goal: Yes  Action steps to achieve this goal:  1. I understand that RN CC will mail alternate transportation resources to me.   2. I will review resources and utilize as I see fit.   3. I will continue to work with care coordination for any additional resources and support.                  Advance Care Plans/Directives Type:      We notice that you do not have an Advance Directive on file. Upon completion of your Health Care Directive, please bring a copy with you to your next office visit.    It has been your Clinic Care Team's pleasure to work with you on your goals.    Regards,  Your Clinic Care Team

## 2022-04-26 ENCOUNTER — TRANSFERRED RECORDS (OUTPATIENT)
Dept: HEALTH INFORMATION MANAGEMENT | Facility: CLINIC | Age: 82
End: 2022-04-26
Payer: COMMERCIAL

## 2022-04-26 ENCOUNTER — TELEPHONE (OUTPATIENT)
Dept: INTERNAL MEDICINE | Facility: CLINIC | Age: 82
End: 2022-04-26
Payer: COMMERCIAL

## 2022-04-26 ENCOUNTER — ANTICOAGULATION THERAPY VISIT (OUTPATIENT)
Dept: ANTICOAGULATION | Facility: CLINIC | Age: 82
End: 2022-04-26
Payer: COMMERCIAL

## 2022-04-26 DIAGNOSIS — I48.20 CHRONIC ATRIAL FIBRILLATION (H): ICD-10-CM

## 2022-04-26 DIAGNOSIS — I48.21 PERMANENT ATRIAL FIBRILLATION (H): Primary | ICD-10-CM

## 2022-04-26 DIAGNOSIS — Z79.01 LONG TERM (CURRENT) USE OF ANTICOAGULANTS: ICD-10-CM

## 2022-04-26 DIAGNOSIS — R21 GROIN RASH: Primary | ICD-10-CM

## 2022-04-26 LAB
INR (EXTERNAL): 2.6 (ref 0.9–1.1)
RETINOPATHY: NEGATIVE

## 2022-04-26 RX ORDER — NYSTATIN 100000 [USP'U]/G
POWDER TOPICAL 2 TIMES DAILY
Qty: 60 G | Refills: 1 | Status: SHIPPED | OUTPATIENT
Start: 2022-04-26 | End: 2023-02-06

## 2022-04-26 NOTE — TELEPHONE ENCOUNTER
Call to patient. Patient reports she cannot talk at this time and will call the clinic back.     When patient returns call, please find out when the yeast infection started, description of infection, best pharmacy for patient.       Roxy LOREDO RN   [facility]

## 2022-04-26 NOTE — TELEPHONE ENCOUNTER
Raj RN home care calls     Patient has been on Vancomycin for 2 weeks. There is now a  yeast infection near groin area. Should she continue and finish meds or add nystatin or to stop meds all together     Best number to call back [phone]

## 2022-04-26 NOTE — TELEPHONE ENCOUNTER
Patient calling back  Start of yeast infection started two weeks ago  Its located at the crease of where her thigh meets her torso. It's red looking and the skin is flaking off. It's painful and a little itchy.   Patient continues to have loose stools that she can not wait to use the toilet.   Patient uses [facility] in [place]  Patient has Nystatin powder but has not been using it because she is not sure she should

## 2022-04-26 NOTE — TELEPHONE ENCOUNTER
Last appointment March 22: Plan:  1. Vancomycin  125 mg orally once daily for 7 days, then 125 mg orally every 2 days for 4 weeks, then 125 mg orally every  3 days for 4 weeks        6/2/2022 -- Office visit      Nystatin powder Rx done

## 2022-04-26 NOTE — TELEPHONE ENCOUNTER
Call to patient.   Patient reports she has had loose stools since October 2021. Patient saw Dr. Lauren on 3/22/2022 for VV for c-diff.     Patient wants to know if she should continue to take Vancomycin?     Patient wonders if her 6/2/2022 appointment for UTI and C-diff follow-up can be virtual instead of in clinic?    Please also see messages below. Raj [facility] home care states patient now has a yeast infection near her groin area. This started 2 weeks ago.     Raj home care wants to know if patient should continue and finish meds or add nystatin or stop meds all together?    Pharmacy updated.     Roxy LOREDO RN   [facility]

## 2022-04-26 NOTE — PROGRESS NOTES
ANTICOAGULATION MANAGEMENT     Savanna Rehman 81 year old female is on warfarin with therapeutic INR result. (Goal INR 2.0-3.0)    Recent labs: (last 7 days)     04/26/22  1212   INR 2.6*       ASSESSMENT       Source(s): Chart Review and Home Care/Facility Nurse       Warfarin doses taken: Missed dose(s) may be affecting INR    Diet: No new diet changes identified    New illness, injury, or hospitalization: No    Medication/supplement changes: None noted    Signs or symptoms of bleeding or clotting: No    Previous INR: Therapeutic last 2(+) visits    Additional findings: May try/start an OTC sleep Aid like Tylenol PM will disucss further when purchased.       PLAN     Recommended plan for temporary change(s) affecting INR     Dosing Instructions: continue your current warfarin dose with next INR in 2 weeks       Summary  As of 4/26/2022    Full warfarin instructions:  3.75 mg every Mon, Wed, Fri; 2.5 mg all other days   Next INR check:  5/10/2022             Telephone call with Raj home care/facility nurse who verbalizes understanding and agrees to plan    Orders given to  Homecare nurse/facility to recheck    Education provided: Please call back if any changes to your diet, medications or how you've been taking warfarin    Plan made per [facility] anticoagulation protocol    Azul Whitaker, RN  Anticoagulation Clinic  4/26/2022    _______________________________________________________________________     Anticoagulation Episode Summary     Current INR goal:  2.0-3.0   TTR:  77.3 % (1 y)   Target end date:  Indefinite   Send INR reminders to:  NIKKI CHASE    Indications    Permanent atrial fibrillation (H) [I48.21]  Chronic atrial fibrillation (H) [I48.20]  Long term (current) use of anticoagulants [Z79.01]           Comments:  Home care          Anticoagulation Care Providers     Provider Role Specialty Phone number    Patti Suárez MD Referring Internal Medicine [phone]

## 2022-05-04 ENCOUNTER — TELEPHONE (OUTPATIENT)
Dept: NURSING | Facility: CLINIC | Age: 82
End: 2022-05-04
Payer: COMMERCIAL

## 2022-05-04 ENCOUNTER — NURSE TRIAGE (OUTPATIENT)
Dept: NURSING | Facility: CLINIC | Age: 82
End: 2022-05-04
Payer: COMMERCIAL

## 2022-05-05 NOTE — TELEPHONE ENCOUNTER
Please see message below. Will repeat cdiff cultures be needed or anything prior to upcoming appointment? Appointment with Dr. Lauren is on 6/2/22. OK to leave detailed message.

## 2022-05-05 NOTE — TELEPHONE ENCOUNTER
Clinic Action Needed:Yes  Reason for Call: Savanna calls and says that she has been on an antibiotic for her C-Diff and on 5/17/2022, this will be her last day for the antibiotic pill. Pt. Says that she wants this nurse to leave a message for Dr. Lauren asking if there is anything else Dr. Lauren wants to do for pt's C-Diff? Pt. Says that she has an appointment on 6/2/2022 to see Dr. Lauren. RN then left this message for Dr. Lauren in [facility], as requested.  Routed to: DELILAH Bach RN   [facility]  [phone]

## 2022-05-05 NOTE — TELEPHONE ENCOUNTER
Savanna calls and says that she has been on an antibiotic for her C-Diff and on 5/17/2022, this will be her last day for the antibiotic pill. Pt. Says that she wants this nurse to leave a message for Dr. Lauren asking if there is anything else Dr. Lauren wants to do for pt's C-Diff? Pt. Says that she has an appointment on 6/2/2022 to see Dr. Lauren. RN then left this message for Dr. Lauren in [facility], as requested. COVID 19 Nurse Triage Plan/Patient Instructions    Please be aware that novel coronavirus (COVID-19) may be circulating in the community. If you develop symptoms such as fever, cough, or SOB or if you have concerns about the presence of another infection including coronavirus (COVID-19), please contact your health care provider or visit [email or web address].     Disposition/Instructions    Virtual Visit with provider recommended. Reference Visit Selection Guide.    Thank you for taking steps to prevent the spread of this virus.  o Limit your contact with others.  o Wear a simple mask to cover your cough.  o Wash your hands well and often.    Resources    [facility]: About COVID-19: [email or web address]    CDC: What to Do If You're Sick: www.cdc.gov/coronavirus/2019-ncov/about/steps-when-sick.html    CDC: Ending Home Isolation: www.cdc.gov/coronavirus/2019-ncov/hcp/disposition-in-home-patients.html     CDC: Caring for Someone: www.cdc.gov/coronavirus/2019-ncov/if-you-are-sick/care-for-someone.html     [facility]: Interim Guidance for Hospital Discharge to Home: www.health.[facility].[place]/diseases/coronavirus/hcp/hospdischarge.pdf    [facility] clinical trials (COVID-19 research studies): clinicalaffairs.[facility]/umn-clinical-trials     Below are the COVID-19 hotlines at the [place] Department of Health ([facility]). Interpreters are available.   o For health questions: Call 723-309-7681 or 1-903.523.3198 (7 a.m. to 7 p.m.)  o For questions about schools and childcare: Call  [phone] or 1-288.495.7435 (7 a.m. to 7 p.m.)                   Reason for Disposition    [1] Caller requesting NON-URGENT health information AND [2] PCP's office is the best resource    Additional Information    Negative: [1] Caller is not with the adult (patient) AND [2] reporting urgent symptoms    Negative: Lab result questions    Negative: Medication questions    Negative: Caller can't be reached by phone    Negative: Caller has already spoken to PCP or another triager    Negative: RN needs further essential information from caller in order to complete triage    Negative: Requesting regular office appointment    Protocols used: INFORMATION ONLY CALL - NO TRIAGE-A-

## 2022-05-09 ENCOUNTER — TELEPHONE (OUTPATIENT)
Dept: INTERNAL MEDICINE | Facility: CLINIC | Age: 82
End: 2022-05-09
Payer: COMMERCIAL

## 2022-05-10 ENCOUNTER — ANTICOAGULATION THERAPY VISIT (OUTPATIENT)
Dept: ANTICOAGULATION | Facility: CLINIC | Age: 82
End: 2022-05-10
Payer: COMMERCIAL

## 2022-05-10 DIAGNOSIS — I48.21 PERMANENT ATRIAL FIBRILLATION (H): Primary | ICD-10-CM

## 2022-05-10 DIAGNOSIS — Z79.01 LONG TERM (CURRENT) USE OF ANTICOAGULANTS: ICD-10-CM

## 2022-05-10 DIAGNOSIS — I48.20 CHRONIC ATRIAL FIBRILLATION (H): ICD-10-CM

## 2022-05-10 LAB — INR (EXTERNAL): 1.9 (ref 0.9–1.1)

## 2022-05-10 NOTE — PROGRESS NOTES
ANTICOAGULATION MANAGEMENT     Savanna Rehman 81 year old female is on warfarin with therapeutic INR result. (Goal INR 2.0-3.0)    Recent labs: (last 7 days)     05/10/22  1145   INR 1.9*       ASSESSMENT       Source(s): Chart Review and Home Care/Facility Nurse       Warfarin doses taken: Warfarin taken as instructed    Diet: No new diet changes identified    New illness, injury, or hospitalization: No    Medication/supplement changes: None noted    Signs or symptoms of bleeding or clotting: No    Previous INR: Therapeutic last 2(+) visits    Additional findings: None       PLAN     Recommended plan for no diet, medication or health factor changes affecting INR     Dosing Instructions: continue your current warfarin dose with next INR in 1 week       Summary  As of 5/10/2022    Full warfarin instructions:  3.75 mg every Mon, Wed, Fri; 2.5 mg all other days   Next INR check:  5/17/2022             Telephone call with Evelin home care/facility nurse who agrees to plan and repeated back plan correctly    Orders given to  Homecare nurse/facility to recheck    Education provided: Please call back if any changes to your diet, medications or how you've been taking warfarin and Contact [phone]  with any changes, questions or concerns.     Plan made per [facility] anticoagulation protocol    Negrita Gramajo RN  Anticoagulation Clinic  5/10/2022    _______________________________________________________________________     Anticoagulation Episode Summary     Current INR goal:  2.0-3.0   TTR:  76.8 % (1 y)   Target end date:  Indefinite   Send INR reminders to:  NIKKI CHASE    Indications    Permanent atrial fibrillation (H) [I48.21]  Chronic atrial fibrillation (H) [I48.20]  Long term (current) use of anticoagulants [Z79.01]           Comments:  Home care          Anticoagulation Care Providers     Provider Role Specialty Phone number    Patti Suárez MD Referring Internal Medicine [phone]

## 2022-05-17 ENCOUNTER — ANTICOAGULATION THERAPY VISIT (OUTPATIENT)
Dept: ANTICOAGULATION | Facility: CLINIC | Age: 82
End: 2022-05-17
Payer: COMMERCIAL

## 2022-05-17 DIAGNOSIS — Z79.01 LONG TERM (CURRENT) USE OF ANTICOAGULANTS: ICD-10-CM

## 2022-05-17 DIAGNOSIS — I48.21 PERMANENT ATRIAL FIBRILLATION (H): Primary | ICD-10-CM

## 2022-05-17 DIAGNOSIS — I48.20 CHRONIC ATRIAL FIBRILLATION (H): ICD-10-CM

## 2022-05-17 LAB — INR (EXTERNAL): 2.1 (ref 0.9–1.1)

## 2022-05-17 NOTE — PROGRESS NOTES
ANTICOAGULATION MANAGEMENT     Savanna Rehman 81 year old female is on warfarin with therapeutic INR result. (Goal INR 2.0-3.0)    Recent labs: (last 7 days)     05/17/22  1218   INR 2.1*       ASSESSMENT       Source(s): Chart Review and Home Care/Facility Nurse       Warfarin doses taken: Warfarin taken as instructed    Diet: No new diet changes identified    New illness, injury, or hospitalization: No    Medication/supplement changes: None noted    Signs or symptoms of bleeding or clotting: No    Previous INR: Subtherapeutic    Additional findings: None       PLAN     Recommended plan for no diet, medication or health factor changes affecting INR     Dosing Instructions: continue your current warfarin dose with next INR in 1 week       Summary  As of 5/17/2022    Full warfarin instructions:  3.75 mg every Mon, Wed, Fri; 2.5 mg all other days   Next INR check:  5/24/2022             Telephone call with Raj home care/facility nurse who agrees to plan and repeated back plan correctly    Orders given to  Homecare nurse/facility to recheck    Education provided: Please call back if any changes to your diet, medications or how you've been taking warfarin    Plan made per [facility] anticoagulation protocol    Azul Whitaker, RN  Anticoagulation Clinic  5/17/2022    _______________________________________________________________________     Anticoagulation Episode Summary     Current INR goal:  2.0-3.0   TTR:  75.8 % (1 y)   Target end date:  Indefinite   Send INR reminders to:  NIKKI CHASE    Indications    Permanent atrial fibrillation (H) [I48.21]  Chronic atrial fibrillation (H) [I48.20]  Long term (current) use of anticoagulants [Z79.01]           Comments:  Home care          Anticoagulation Care Providers     Provider Role Specialty Phone number    Patti Suárez MD Referring Internal Medicine [phone]

## 2022-05-20 ENCOUNTER — TELEPHONE (OUTPATIENT)
Dept: INTERNAL MEDICINE | Facility: CLINIC | Age: 82
End: 2022-05-20

## 2022-05-23 ENCOUNTER — TELEPHONE (OUTPATIENT)
Dept: UROLOGY | Facility: CLINIC | Age: 82
End: 2022-05-23

## 2022-05-23 ENCOUNTER — PRE VISIT (OUTPATIENT)
Dept: UROLOGY | Facility: CLINIC | Age: 82
End: 2022-05-23

## 2022-05-23 DIAGNOSIS — Z53.9 DIAGNOSIS NOT YET DEFINED: Primary | ICD-10-CM

## 2022-05-23 PROCEDURE — G0179 MD RECERTIFICATION HHA PT: HCPCS | Performed by: INTERNAL MEDICINE

## 2022-05-23 NOTE — TELEPHONE ENCOUNTER
Last refill for lorazepam was 03/03/2017 #60 1 refill   Last seen on 04/10/2017    Please advise for refills   Reason for visit: Worsening incontinence      Relevant information: Pt has declined UDS in the past    Records/imaging/labs/orders: UDS not done    Pt called: message routed to scheduling to set up UDS    At Rooming: brigitte Perkins CMA  5/23/2022  2:45 PM

## 2022-05-24 ENCOUNTER — NURSE TRIAGE (OUTPATIENT)
Dept: NURSING | Facility: CLINIC | Age: 82
End: 2022-05-24
Payer: COMMERCIAL

## 2022-05-24 ENCOUNTER — ANTICOAGULATION THERAPY VISIT (OUTPATIENT)
Dept: ANTICOAGULATION | Facility: CLINIC | Age: 82
End: 2022-05-24
Payer: COMMERCIAL

## 2022-05-24 ENCOUNTER — TELEPHONE (OUTPATIENT)
Dept: INTERNAL MEDICINE | Facility: CLINIC | Age: 82
End: 2022-05-24
Payer: COMMERCIAL

## 2022-05-24 DIAGNOSIS — I48.20 CHRONIC ATRIAL FIBRILLATION (H): ICD-10-CM

## 2022-05-24 DIAGNOSIS — Z79.01 LONG TERM (CURRENT) USE OF ANTICOAGULANTS: ICD-10-CM

## 2022-05-24 DIAGNOSIS — N39.0 RECURRENT UTI: Primary | ICD-10-CM

## 2022-05-24 DIAGNOSIS — I48.21 PERMANENT ATRIAL FIBRILLATION (H): Primary | ICD-10-CM

## 2022-05-24 DIAGNOSIS — R51.9 ACUTE INTRACTABLE HEADACHE, UNSPECIFIED HEADACHE TYPE: ICD-10-CM

## 2022-05-24 LAB — INR (EXTERNAL): 1.8 (ref 0.9–1.1)

## 2022-05-24 RX ORDER — BUTALBITAL, ASPIRIN, AND CAFFEINE 325; 50; 40 MG/1; MG/1; MG/1
1 CAPSULE ORAL EVERY 6 HOURS PRN
Qty: 30 CAPSULE | Refills: 0 | Status: CANCELLED | OUTPATIENT
Start: 2022-05-24

## 2022-05-24 NOTE — TELEPHONE ENCOUNTER
Patient calls to say she has a re occurring bladder infection and asks if she can take an over the counter medication for it?     Patient also requested refill which is in this encounter

## 2022-05-24 NOTE — PROGRESS NOTES
ANTICOAGULATION MANAGEMENT     Savanna Rehman 81 year old female is on warfarin with subtherapeutic INR result. (Goal INR 2.0-3.0)    Recent labs: (last 7 days)     05/24/22  1131   INR 1.8*       ASSESSMENT       Source(s): Chart Review and Home Care/Facility Nurse       Warfarin doses taken: Warfarin taken as instructed    Diet: No new diet changes identified    New illness, injury, or hospitalization: Yes: thinks she has UTI    Medication/supplement changes: Lasix 3 time last week    Signs or symptoms of bleeding or clotting: No    Previous INR: Therapeutic last visit; previously outside of goal range    Additional findings: None       PLAN     Recommended plan for no diet, medication or health factor changes affecting INR     Dosing Instructions: booster dose then continue your current warfarin dose with next INR in 1 week       Summary  As of 5/24/2022    Full warfarin instructions:  5/24: 3.75 mg; Otherwise 3.75 mg every Mon, Wed, Fri; 2.5 mg all other days   Next INR check:  5/31/2022             Telephone call with Raj home care/facility nurse who verbalizes understanding and agrees to plan    Orders given to  Homecare nurse/facility to recheck    Education provided: None required    Plan made per [facility] anticoagulation protocol    Mahsa Scott, RN  Anticoagulation Clinic  5/24/2022    _______________________________________________________________________     Anticoagulation Episode Summary     Current INR goal:  2.0-3.0   TTR:  76.4 % (1 y)   Target end date:  Indefinite   Send INR reminders to:  NIKKI CHASE    Indications    Permanent atrial fibrillation (H) [I48.21]  Chronic atrial fibrillation (H) [I48.20]  Long term (current) use of anticoagulants [Z79.01]           Comments:  Home care          Anticoagulation Care Providers     Provider Role Specialty Phone number    Patti Suárez MD Referring Internal Medicine [phone]

## 2022-05-25 DIAGNOSIS — R51.9 ACUTE INTRACTABLE HEADACHE, UNSPECIFIED HEADACHE TYPE: ICD-10-CM

## 2022-05-25 NOTE — TELEPHONE ENCOUNTER
"\"Well I talked to Alyssa. She gave me one piece of advice that just didn't seem appropriate.\" Savanna states she has a positive bladder infection and was referring to the advice that Alyssa informed patient she should be seen for her urinary symptoms. Savanna states she has a history of bladder infections and Dr. Lauren usually will order a UA/ UC that patient would complete and then wait for further directive from Dr. Lauren. Patient was concerned that Alyssa was saying she should be seen as Dr. Lauren is scheduling appointments several weeks out and would not be able to be seen anytime soon.     In addition, patient currently taking Vanco and is scheduled to take her last dose tomorrow. Savanna was also told by her Infectious Disease provider she should not be taking any antibiotics while she is being treated with Vanco for her Cdiff. Savanna is looking to receive specific instructions from Dr. Lauren in regards to 1) her bladder infection symptoms and 2) if she is able to take OTC medication for urinary pain relief as indicate on the container: Methenamine - Sodium Salicylate (Compared to Azo urinary tract defense active ingredient). Patient denies blood in the urine, spasms, flank/ back pain, and fevers. Savanna also reporting a feeling of her lower belly pulsating as she is resting/ sitting in her chair. Savanna is otherwise awake, alert, and responding appropriately.     Triage guidelines recommend to see a provider within 2 weeks. Savanna states she has an appointment w/ Dr. Lauren, but not until Thursday, 6/2. FNA reviewed S/S to watch and call back for. FNA RN will also send a message to care team/ provider. RN advised to call back with any changes, worsening of symptoms, and questions or concerns. Patient verbalized understanding of and agreement with plan and had no further questions.     Reason for Disposition    All other urine symptoms    Additional Information    Negative: Shock suspected (e.g., " cold/pale/clammy skin, too weak to stand, low BP, rapid pulse)    Negative: Sounds like a life-threatening emergency to the triager    Negative: [1] Unable to urinate (or only a few drops) > 4 hours AND     [2] bladder feels very full (e.g., palpable bladder or strong urge to urinate)    Negative: [1] Decreased urination and [2] drinking very little AND [2] dehydration suspected (e.g., dark urine, no urine > 12 hours, very dry mouth, very lightheaded)    Negative: Patient sounds very sick or weak to the triager    Negative: Fever > 100.4 F (38.0 C)    Negative: Side (flank) or lower back pain present    Negative: [1] Can't control passage of urine (i.e., urinary incontinence) AND [2] new onset (< 2 weeks) or worsening    Negative: Urinating more frequently than usual (i.e., frequency)    Negative: Bad or foul-smelling urine    Negative: [1] Can't control passage of urine (i.e., urinary incontinence, wetting self) AND [2] present > 2 weeks    Negative: Urination is difficult to start (i.e., hesitancy) or straining    Negative: Dribbling (losing urine) just after finishing urination (i.e., post-void dribbling)    Negative: Has to get out of bed to urinate > 2 times a night (i.e., nocturia)    Protocols used: URINARY SYMPTOMS-A-    Allison Gilliland RN-BSN  [facility] Ad[facility]

## 2022-05-25 NOTE — TELEPHONE ENCOUNTER
"Clinic Action Needed:Yes, please call Savanna back at [phone] (H).   Ok to leave a detailed VM per patient.     Reason for Call:  \"Well I talked to Alyssa. She gave me one piece of advice that just didn't seem appropriate.\" Savanna states she has a positive bladder infection and was referring to the advice that Alyssa informed patient she should be seen for her urinary symptoms. Savanna states she has a history of bladder infections and Dr. Lauren usually will order a UA/ UC that patient would complete and then wait for further directive from Dr. Lauren. Patient was concerned that Alyssa was saying she should be seen as Dr. Lauren is scheduling appointments several weeks out and would not be able to be seen anytime soon.     In addition, patient currently taking Vanco and is scheduled to take her last dose tomorrow. Savanna was also told by her Infectious Disease provider she should not be taking any antibiotics while she is being treated with Vanco for her Cdiff. Savanna is looking to receive specific instructions from Dr. Lauren in regards to 1) her bladder infection symptoms and 2) if she is able to take OTC medication for urinary pain relief as indicate on the container: Methenamine - Sodium Salicylate (Compared to Azo urinary tract defense active ingredient). Patient denies blood in the urine, spasms, flank/ back pain, and fevers. Savanna also reporting a feeling of her lower belly pulsating as she is resting/ sitting in her chair. Savanna is otherwise awake, alert, and responding appropriately.     Patient Recommendations/Teaching:  Triage guidelines recommend to see a provider within 2 weeks. Savanna states she has an appointment w/ Dr. Lauren, but not until Thursday, 6/2. [facility] reviewed S/S to watch and call back for. FNA RN will also send a message to care team/ provider. RN advised to call back with any changes, worsening of symptoms, and questions or concerns. Patient verbalized understanding of and " agreement with plan and had no further questions.     Routed to: Dr. Lauren/ Care Team    Allison Gilliland, RN-BSN  [facility]

## 2022-05-25 NOTE — TELEPHONE ENCOUNTER
patient advised, she will have someone to drop sample off at [facility] tomorrow.  Has supplies at home.  BENITA Graham R.N.

## 2022-05-26 ENCOUNTER — TELEPHONE (OUTPATIENT)
Dept: INTERNAL MEDICINE | Facility: CLINIC | Age: 82
End: 2022-05-26

## 2022-05-26 NOTE — TELEPHONE ENCOUNTER
Patient has called requesting urgent refill.    Butalbital-aspirin-caffeine (Fiorinal) -40 mg capsule  Routing refill request to provider for review/approval because:  Last prescribed 1/12/2022

## 2022-05-27 ENCOUNTER — TELEPHONE (OUTPATIENT)
Dept: INTERNAL MEDICINE | Facility: CLINIC | Age: 82
End: 2022-05-27
Payer: COMMERCIAL

## 2022-05-27 RX ORDER — BUTALBITAL, ASPIRIN, AND CAFFEINE 325; 50; 40 MG/1; MG/1; MG/1
CAPSULE ORAL
Qty: 30 CAPSULE | Refills: 0 | Status: SHIPPED | OUTPATIENT
Start: 2022-05-27 | End: 2023-03-25

## 2022-05-27 NOTE — TELEPHONE ENCOUNTER
Next 5 appointments (look out 90 days)    Jun 02, 2022  1:15 PM  (Arrive by 1:00 PM)  Return Visit with Deisy Wilson MD  [facility] ([facility] ) [address] [address]  [phone]   Jul 14, 2022 10:30 AM  (Arrive by 10:10 AM)  Annual Wellness Visit with Patti Suárez MD  [facility] ([facility] ) [address] [place] [id number] [phone]         pending

## 2022-05-27 NOTE — TELEPHONE ENCOUNTER
Patient is calling to get her urine results. Patient states she is not supposed to take antibiotics because of her c-diff. She said she can talk to Dr Lauren more about this at her appointment.

## 2022-05-27 NOTE — TELEPHONE ENCOUNTER
Patient calling requesting results from UA. RN advised results have not been reviewed by a provider yet but we are awaiting a urine culture to be performed. RN advised urine culture is in process. Patient states she cannot take ABX until reviewed by her Infectious Disease provider. RN advised increased fluids and to await results from provider. No further questions.     Gagan KO RN

## 2022-05-27 NOTE — TELEPHONE ENCOUNTER
Pt wants a refill on her Butalbitol  Fiorinol. She says she has left messages and nobody calls her back. Her pharmacy says nobody is responding from the clinic. Please call her back.

## 2022-06-01 ENCOUNTER — TELEPHONE (OUTPATIENT)
Dept: ANTICOAGULATION | Facility: CLINIC | Age: 82
End: 2022-06-01
Payer: COMMERCIAL

## 2022-06-01 ENCOUNTER — MEDICAL CORRESPONDENCE (OUTPATIENT)
Dept: INTERNAL MEDICINE | Facility: CLINIC | Age: 82
End: 2022-06-01

## 2022-06-01 DIAGNOSIS — I48.21 PERMANENT ATRIAL FIBRILLATION (H): Primary | ICD-10-CM

## 2022-06-01 DIAGNOSIS — Z79.01 LONG TERM (CURRENT) USE OF ANTICOAGULANTS: ICD-10-CM

## 2022-06-01 DIAGNOSIS — I48.20 CHRONIC ATRIAL FIBRILLATION (H): ICD-10-CM

## 2022-06-01 NOTE — TELEPHONE ENCOUNTER
Raj home care nurse left a detailed message on home care line at 3:40 pm. She states patient was to be seen yesterday but refused her visit. She tried to reschedule for today but unable to do so. Next visit is next week on Tuesday.  She will do INR at that time.  Patient has an office visit tomorrow so will ask INR to be done at visit.  Recommended Home care to do INR at next home visit. Patient to continue same coumadin dosing as well and will await INR result tomorrow for further dosing.   Azul Whitaker, RN  Anticoagulation Nurse - [facility], [place]

## 2022-06-02 ENCOUNTER — TELEPHONE (OUTPATIENT)
Dept: INTERNAL MEDICINE | Facility: CLINIC | Age: 82
End: 2022-06-02

## 2022-06-02 ENCOUNTER — OFFICE VISIT (OUTPATIENT)
Dept: INTERNAL MEDICINE | Facility: CLINIC | Age: 82
End: 2022-06-02
Payer: COMMERCIAL

## 2022-06-02 ENCOUNTER — ANTICOAGULATION THERAPY VISIT (OUTPATIENT)
Dept: ANTICOAGULATION | Facility: CLINIC | Age: 82
End: 2022-06-02

## 2022-06-02 VITALS
TEMPERATURE: 97.7 F | DIASTOLIC BLOOD PRESSURE: 75 MMHG | BODY MASS INDEX: 39.08 KG/M2 | RESPIRATION RATE: 16 BRPM | SYSTOLIC BLOOD PRESSURE: 135 MMHG | HEART RATE: 67 BPM | OXYGEN SATURATION: 94 % | WEIGHT: 257 LBS

## 2022-06-02 DIAGNOSIS — N30.00 ACUTE CYSTITIS WITHOUT HEMATURIA: ICD-10-CM

## 2022-06-02 DIAGNOSIS — E11.65 TYPE 2 DIABETES MELLITUS WITH HYPERGLYCEMIA, WITHOUT LONG-TERM CURRENT USE OF INSULIN (H): ICD-10-CM

## 2022-06-02 DIAGNOSIS — Z79.01 LONG TERM (CURRENT) USE OF ANTICOAGULANTS: ICD-10-CM

## 2022-06-02 DIAGNOSIS — A04.72 C. DIFFICILE COLITIS: Primary | ICD-10-CM

## 2022-06-02 DIAGNOSIS — I48.20 CHRONIC ATRIAL FIBRILLATION (H): ICD-10-CM

## 2022-06-02 DIAGNOSIS — E78.5 HYPERLIPIDEMIA LDL GOAL <100: ICD-10-CM

## 2022-06-02 DIAGNOSIS — I48.21 PERMANENT ATRIAL FIBRILLATION (H): ICD-10-CM

## 2022-06-02 DIAGNOSIS — N18.31 STAGE 3A CHRONIC KIDNEY DISEASE (H): ICD-10-CM

## 2022-06-02 DIAGNOSIS — H81.10 BENIGN PAROXYSMAL POSITIONAL VERTIGO, UNSPECIFIED LATERALITY: ICD-10-CM

## 2022-06-02 DIAGNOSIS — I48.21 PERMANENT ATRIAL FIBRILLATION (H): Primary | ICD-10-CM

## 2022-06-02 LAB
ERYTHROCYTE [DISTWIDTH] IN BLOOD BY AUTOMATED COUNT: 12.8 % (ref 10–15)
HBA1C MFR BLD: 6.5 % (ref 0–5.6)
HCT VFR BLD AUTO: 47.2 % (ref 35–47)
HGB BLD-MCNC: 16 G/DL (ref 11.7–15.7)
INR BLD: 2.5 (ref 0.9–1.1)
MCH RBC QN AUTO: 30.7 PG (ref 26.5–33)
MCHC RBC AUTO-ENTMCNC: 33.9 G/DL (ref 31.5–36.5)
MCV RBC AUTO: 91 FL (ref 78–100)
PLATELET # BLD AUTO: 198 10E3/UL (ref 150–450)
RBC # BLD AUTO: 5.21 10E6/UL (ref 3.8–5.2)
WBC # BLD AUTO: 5.9 10E3/UL (ref 4–11)

## 2022-06-02 PROCEDURE — 83036 HEMOGLOBIN GLYCOSYLATED A1C: CPT | Performed by: INTERNAL MEDICINE

## 2022-06-02 PROCEDURE — 36415 COLL VENOUS BLD VENIPUNCTURE: CPT | Performed by: INTERNAL MEDICINE

## 2022-06-02 PROCEDURE — 85027 COMPLETE CBC AUTOMATED: CPT | Performed by: INTERNAL MEDICINE

## 2022-06-02 PROCEDURE — 80061 LIPID PANEL: CPT | Performed by: INTERNAL MEDICINE

## 2022-06-02 PROCEDURE — 82550 ASSAY OF CK (CPK): CPT | Performed by: INTERNAL MEDICINE

## 2022-06-02 PROCEDURE — 99214 OFFICE O/P EST MOD 30 MIN: CPT | Performed by: INTERNAL MEDICINE

## 2022-06-02 PROCEDURE — 85610 PROTHROMBIN TIME: CPT | Performed by: INTERNAL MEDICINE

## 2022-06-02 PROCEDURE — 80053 COMPREHEN METABOLIC PANEL: CPT | Performed by: INTERNAL MEDICINE

## 2022-06-02 PROCEDURE — 84443 ASSAY THYROID STIM HORMONE: CPT | Performed by: INTERNAL MEDICINE

## 2022-06-02 RX ORDER — CEPHALEXIN 500 MG/1
500 CAPSULE ORAL 2 TIMES DAILY
Qty: 14 CAPSULE | Refills: 0 | Status: SHIPPED | OUTPATIENT
Start: 2022-06-02 | End: 2022-09-20

## 2022-06-02 RX ORDER — VANCOMYCIN HYDROCHLORIDE 125 MG/1
125 CAPSULE ORAL 2 TIMES DAILY
Qty: 40 CAPSULE | Refills: 1 | Status: SHIPPED | OUTPATIENT
Start: 2022-06-02 | End: 2022-10-20

## 2022-06-02 RX ORDER — MECLIZINE HCL 12.5 MG 12.5 MG/1
TABLET ORAL
Qty: 30 TABLET | Refills: 1 | Status: SHIPPED | OUTPATIENT
Start: 2022-06-02 | End: 2023-07-10

## 2022-06-02 RX ORDER — VANCOMYCIN HYDROCHLORIDE 125 MG/1
125 CAPSULE ORAL 2 TIMES DAILY
Qty: 20 CAPSULE | Refills: 0 | Status: SHIPPED | OUTPATIENT
Start: 2022-06-02 | End: 2022-09-20

## 2022-06-02 NOTE — PATIENT INSTRUCTIONS
Plan:  1. Cephalexin 500 mg twice a day -- for urine infection ( 7 days)   2. Vancomycin 125 mg twice a day -- prophylactic for CDiff ( 10 days)  3. Continue the other meds, same doses for now.  4.  Labs today - [address]   5. Follow up Aug 4 (  SD 10 spot)

## 2022-06-02 NOTE — PROGRESS NOTES
Patient's instructions / PLAN:                                                        Plan:  1. Cephalexin 500 mg twice a day -- for urine infection ( 7 days)   2. Vancomycin 125 mg twice a day -- prophylactic for CDiff ( 10 days)  3. Continue the other meds, same doses for now.  4.  Labs today - [address]   5. Follow up Aug 4 (  SD 10 spot)       ASSESSMENT & PLAN:                                                      Med Prob: multiple drug allergies( angioedema 2015), DM 2, chr AFib on warfarin,   HLip, CRISTIANA on CPAP, Urgency-Frequency syndrome, recurrent UTI,     (A04.72) C. difficile colitis, recurrent -- she needs to take Vanco 125 bid anytime she is on a different Abx   (primary encounter diagnosis)  Comment:   Plan: vancomycin (VANCOCIN) 125 MG capsule,         vancomycin (VANCOCIN) 125 MG capsule, CBC with         platelets, CK total, Lipid panel reflex to         direct LDL Fasting, Comprehensive metabolic         panel, Hemoglobin A1c, TSH with free T4 reflex,        Albumin Random Urine Quantitative with Creat         Ratio            (N30.00) Acute cystitis without hematuria  Comment:   Plan: cephALEXin (KEFLEX) 500 MG capsule, CBC with         platelets, CK total, Lipid panel reflex to         direct LDL Fasting, Comprehensive metabolic         panel, Hemoglobin A1c, TSH with free T4 reflex,        Albumin Random Urine Quantitative with Creat         Ratio            (I48.20) Chronic atrial fibrillation (H)  Comment:   Plan: CBC with platelets, CK total, Lipid panel         reflex to direct LDL Fasting, Comprehensive         metabolic panel, Hemoglobin A1c, TSH with free         T4 reflex, Albumin Random Urine Quantitative         with Creat Ratio            (N18.31) Stage 3a chronic kidney disease (H)  Comment: stable   Plan: CBC with platelets, CK total, Lipid panel         reflex to direct LDL Fasting, Comprehensive         metabolic panel, Hemoglobin A1c, TSH with free         T4 reflex, Albumin  Random Urine Quantitative         with Creat Ratio            (E78.5) Hyperlipidemia LDL goal <100  Comment: Controlled    Plan: CBC with platelets, CK total, Lipid panel         reflex to direct LDL Fasting, Comprehensive         metabolic panel, Hemoglobin A1c, TSH with free         T4 reflex, Albumin Random Urine Quantitative         with Creat Ratio            (I48.21) Permanent atrial fibrillation (H)  Comment: rate Controlled    Plan: CBC with platelets, CK total, Lipid panel         reflex to direct LDL Fasting, Comprehensive         metabolic panel, Hemoglobin A1c, TSH with free         T4 reflex, Albumin Random Urine Quantitative         with Creat Ratio            (Z79.01) Long term (current) use of anticoagulants  Comment:   Plan: CBC with platelets, CK total, Lipid panel         reflex to direct LDL Fasting, Comprehensive         metabolic panel, Hemoglobin A1c, TSH with free         T4 reflex, Albumin Random Urine Quantitative         with Creat Ratio            (E11.65) Type 2 diabetes mellitus with hyperglycemia, without long-term current use of insulin (H)  Comment:   Plan: CBC with platelets, CK total, Lipid panel         reflex to direct LDL Fasting, Comprehensive         metabolic panel, Hemoglobin A1c, TSH with free         T4 reflex, Albumin Random Urine Quantitative         with Creat Ratio               Chief Complaint:                                                      Follow up chronic medical problems   UTI      SUBJECTIVE:                                                    History of present illness     ID consult w dr Granados April 6 for recur UTI, C. Diff  Who recommended prophylax w Vanco 125 bid any time she takes systemic Abx      UTI  -- she has lower abd pain No dysuria but some diff passing urine ( she needs to lean forward on the toilet )   -- UC: EColi pansesnitive  -- urol quang w dr Wilson next week    CDiff   -- numerous recurrent CDiff   -- no diarrhea ( she has stool  incontinence though)   -- she finished Vanco last week   -- financial diff -- info about Good Rx given       Multiple Abx Allergies.  She states she tolerated Cephalexin in March     ROS:                                                      ROS: negative for fever, chills, cough, wheezes, chest pain, shortness of breath, vomiting, abdominal pain, leg swelling       OBJECTIVE:                                                    Physical Exam :   Blood pressure 135/75, pulse 67, temperature 97.7  F (36.5  C), temperature source Oral, resp. rate 16, weight 116.6 kg (257 lb), SpO2 94 %, not currently breastfeeding.     NAD, appears comfortable  Skin: no rashes   Neck: supple, no JVD, No thyroidmegaly. Lymph nodes nonpalpable cervical and supraclavicular.  Chest: clear to auscultation bilaterally, good respiratory effort  Heart: S1 S2, RRR, no mgr appreciated  Abdomen: soft, not tender,   Extremities: no edema,   Neurologic: A, Ox3, no focal signs appreciated    PMHx: reviewed  Past Medical History:   Diagnosis Date     Anemia     Iron Deficiency anemia     Atrial fibrillation (H)      CAD (coronary artery disease)     non-obstructive     Chronic pain     neck, low back, legs     Congestive heart failure (H)      Degenerative disk disease      Fibromyalgia      Gastro-oesophageal reflux disease      Gout      Hiatal hernia      Mumps      Neuropathy      CRISTIANA (obstructive sleep apnea) - CPAP      Palpitations      Pernicious anemia      Sleep apnea     uses CPAP.     Urinary incontinence      Vitamin D deficiency       PSHx: reviewed  Past Surgical History:   Procedure Laterality Date     APPENDECTOMY       CHOLECYSTECTOMY       COLONOSCOPY  3/15/2011     CORONARY ANGIOGRAPHY ADULT ORDER       CYSTOSCOPY, BIOPSY BLADDER, COMBINED N/A 7/13/2020    Procedure: CYSTOSCOPY, WITH BLADDER BIOPSY;  Surgeon: Deisy Wilson MD;  Location: [facility] OR     HEART CATH LEFT HEART CATH  12/30/16    medication management      HYSTERECTOMY TOTAL ABDOMINAL       Knee replacement NOS Left      LAPAROSCOPIC NISSEN FUNDOPLICATION N/A 2/4/2015    Procedure: LAPAROSCOPIC NISSEN FUNDOPLICATION;  Surgeon: Armando Ansari MD;  Location: [facility] OR     TONSILLECTOMY       TRANSPOSITION ULNAR NERVE (ELBOW)          Meds: reviewed  Current Outpatient Medications   Medication Sig Dispense Refill     ACE/ARB/ARNI NOT PRESCRIBED (INTENTIONAL) Please choose reason not prescribed, below       alcohol swab prep pads Use to swab area of injection/chandrakant as directed. 100 each 3     B Complex-C (SUPER B COMPLEX PO) Take 1 tablet by mouth At Bedtime       balsalazide (COLAZAL) 750 MG capsule Take 2,250 mg by mouth 3 times daily       Biotin 5000 MCG TABS Take 5,000 mcg by mouth At Bedtime       blood glucose (NO BRAND SPECIFIED) lancets standard Use to test blood sugar 1 time daily 100 lancet 3     blood glucose (NO BRAND SPECIFIED) lancets standard Use to test blood sugar  as directed. 1 each 0     blood glucose (NO BRAND SPECIFIED) lancing device Device to be used with lancets. Patient requests [employer] 2 lancing device to check blood glucose once per day. 1 each 0     blood glucose calibration (NO BRAND SPECIFIED) solution Use to calibrate blood glucose monitor 1 Bottle 3     blood glucose monitoring (NO BRAND SPECIFIED) meter device kit Use to test blood sugar 1 times daily or as directed. 1 kit 1     blood glucose monitoring (NO BRAND SPECIFIED) meter device kit Use to test blood sugar 1 time daily 1 kit 0     butalbital-aspirin-caffeine (FIORINAL) -40 MG capsule TAKE 1 CAPSULE BY MOUTH EVERY 6 HOURS AS NEEDED FOR HEADACHE 30 capsule 0     cholecalciferol (VITAMIN D3) 5000 units (125 mcg) capsule Take 5,000 Units by mouth At Bedtime       EPINEPHrine (EPIPEN 2-ROBBY) 0.3 MG/0.3ML injection 2-pack Inject 0.3 mLs (0.3 mg) into the muscle once as needed for anaphylaxis 1 each 1     estradiol (ESTRACE) 0.1 MG/GM vaginal cream Please use your finger to  place a large blueberry size amount in the vagina nightly for two weeks and then three times a week at night thereafter 42.5 g 3     fidaxomicin (DIFICID) 200 MG tablet Take 1 tablet (200 mg) by mouth 2 times daily 20 tablet 0     glipiZIDE (GLUCOTROL XL) 2.5 MG 24 hr tablet Take 1 tablet (2.5 mg) by mouth 2 times daily 180 tablet 0     meclizine (ANTIVERT) 12.5 MG tablet TAKE 1 TABLET BY MOUTH THREE TIMES DAILY AS NEEDED FOR DIZZINESS 30 tablet 0     nystatin (MYCOSTATIN) 730256 UNIT/GM external powder Apply topically 2 times daily 60 g 1     nystatin (MYCOSTATIN) 047697 UNIT/GM external powder Apply to bilateral groin area 2x daily as needed. 30 g 1     ondansetron (ZOFRAN) 8 MG tablet Take 1 tablet (8 mg) by mouth every 8 hours as needed for nausea 30 tablet 0     ONETOUCH ULTRA test strip USE 1 STRIP TO CHECK GLUCOSE ONCE DAILY 100 strip 11     order for DME Oxygen 2 Li/min  at night bleed with CPAP       oxybutynin (DITROPAN) 5 MG tablet TAKE 1 TABLET TWICE A DAY 90 tablet 4     warfarin ANTICOAGULANT (COUMADIN) 2.5 MG tablet TAKE ONE AND ONE-HALF TABLETS ( 3.75 MG ) EVERY Monday, Wednesday, Friday  AND TAKE 1 TABLET ALL OTHER DAYS OF THE WEEK OR AS DIRECTED BY [facility] 126 tablet 1       Soc Hx: reviewed  Fam Hx: reviewed          Patti Lauren MD  Internal Medicine

## 2022-06-02 NOTE — PROGRESS NOTES
ANTICOAGULATION MANAGEMENT     Savanna Rehman 81 year old female is on warfarin with therapeutic INR result. (Goal INR 2.0-3.0)    Recent labs: (last 7 days)     06/02/22  1129   INR 2.5*       ASSESSMENT       Source(s): Chart Review and Patient/Caregiver Call       Warfarin doses taken: Warfarin taken as instructed    Diet: No new diet changes identified    New illness, injury, or hospitalization: Yes: UTI, patient has chronic C.diff, reports no diarrhea currently per 6/2 Office visit notes    Medication/supplement changes: Cephalexin 7 day course (dates: 6/2-6/9) which has potential for interaction; increasing INR, on vancomycin for prophylactic for C.diff for 10 days 6/2-6/12    Signs or symptoms of bleeding or clotting: No    Previous INR: Subtherapeutic    Additional findings: None       PLAN     Recommended plan for temporary change(s) and ongoing change(s) affecting INR     Dosing Instructions: continue your current warfarin dose with next INR in 5 days       Summary  As of 6/2/2022    Full warfarin instructions:  3.75 mg every Mon, Wed, Fri; 2.5 mg all other days   Next INR check:  6/7/2022             Left message for patient to return call to [facility]    Orders given to  Homecare nurse/facility to recheck, per 6/1/22 telephone encounter home care was instructed to recheck INR on tues 6/7 which is appropriate.    Education provided: Please call back if any changes to your diet, medications or how you've been taking warfarin and Potential interaction between warfarin and antibiotics    Plan made per [facility] anticoagulation protocol    Iris Kemp, RN  Anticoagulation Clinic  6/2/2022    _______________________________________________________________________     Anticoagulation Episode Summary     Current INR goal:  2.0-3.0   TTR:  76.9 % (1 y)   Target end date:  Indefinite   Send INR reminders to:  NIKKI CHASE    Indications    Permanent atrial fibrillation (H) [I48.21]  Chronic atrial fibrillation (H)  [I48.20]  Long term (current) use of anticoagulants [Z79.01]           Comments:  Home care          Anticoagulation Care Providers     Provider Role Specialty Phone number    Patti Suárez MD Referring Internal Medicine [phone]

## 2022-06-03 ENCOUNTER — TELEPHONE (OUTPATIENT)
Dept: INTERNAL MEDICINE | Facility: CLINIC | Age: 82
End: 2022-06-03
Payer: COMMERCIAL

## 2022-06-03 LAB
ALBUMIN SERPL-MCNC: 3.8 G/DL (ref 3.4–5)
ALP SERPL-CCNC: 87 U/L (ref 40–150)
ALT SERPL W P-5'-P-CCNC: 32 U/L (ref 0–50)
ANION GAP SERPL CALCULATED.3IONS-SCNC: 3 MMOL/L (ref 3–14)
AST SERPL W P-5'-P-CCNC: 36 U/L (ref 0–45)
BILIRUB SERPL-MCNC: 0.9 MG/DL (ref 0.2–1.3)
BUN SERPL-MCNC: 16 MG/DL (ref 7–30)
CALCIUM SERPL-MCNC: 9.8 MG/DL (ref 8.5–10.1)
CHLORIDE BLD-SCNC: 103 MMOL/L (ref 94–109)
CHOLEST SERPL-MCNC: 154 MG/DL
CK SERPL-CCNC: 39 U/L (ref 30–225)
CO2 SERPL-SCNC: 31 MMOL/L (ref 20–32)
CREAT SERPL-MCNC: 1.02 MG/DL (ref 0.52–1.04)
FASTING STATUS PATIENT QL REPORTED: YES
GFR SERPL CREATININE-BSD FRML MDRD: 56 ML/MIN/1.73M2
GLUCOSE BLD-MCNC: 140 MG/DL (ref 70–99)
HDLC SERPL-MCNC: 32 MG/DL
LDLC SERPL CALC-MCNC: 100 MG/DL
NONHDLC SERPL-MCNC: 122 MG/DL
POTASSIUM BLD-SCNC: 4.7 MMOL/L (ref 3.4–5.3)
PROT SERPL-MCNC: 8.5 G/DL (ref 6.8–8.8)
SODIUM SERPL-SCNC: 137 MMOL/L (ref 133–144)
TRIGL SERPL-MCNC: 112 MG/DL
TSH SERPL DL<=0.005 MIU/L-ACNC: 1.23 MU/L (ref 0.4–4)

## 2022-06-03 NOTE — TELEPHONE ENCOUNTER
Please clarify if something different was discussed with patient other than what was sent and is documented in office visit note.

## 2022-06-03 NOTE — TELEPHONE ENCOUNTER
Patient is calling because she thought she was supposed to be taking cephalexin for two weeks but only one week was sent.

## 2022-06-04 ENCOUNTER — TELEPHONE (OUTPATIENT)
Dept: NURSING | Facility: CLINIC | Age: 82
End: 2022-06-04
Payer: COMMERCIAL

## 2022-06-04 NOTE — TELEPHONE ENCOUNTER
Pt is phoning wanting lab results - writer explained that pt will need to phone her provider on Monday as we are not allowed to go over lab results with patients     No Triage     Care advice given per protocol and when to call back. Pt verbalized understanding and agrees to plan of care.    Yumi Beckford RN  [place] Nurse Advisor  7:17 AM 6/4/2022

## 2022-06-06 ENCOUNTER — TELEPHONE (OUTPATIENT)
Dept: INTERNAL MEDICINE | Facility: CLINIC | Age: 82
End: 2022-06-06
Payer: COMMERCIAL

## 2022-06-07 ENCOUNTER — ANTICOAGULATION THERAPY VISIT (OUTPATIENT)
Dept: ANTICOAGULATION | Facility: CLINIC | Age: 82
End: 2022-06-07
Payer: COMMERCIAL

## 2022-06-07 ENCOUNTER — TELEPHONE (OUTPATIENT)
Dept: INTERNAL MEDICINE | Facility: CLINIC | Age: 82
End: 2022-06-07
Payer: COMMERCIAL

## 2022-06-07 DIAGNOSIS — Z79.01 LONG TERM (CURRENT) USE OF ANTICOAGULANTS: ICD-10-CM

## 2022-06-07 DIAGNOSIS — N30.00 ACUTE CYSTITIS WITHOUT HEMATURIA: Primary | ICD-10-CM

## 2022-06-07 DIAGNOSIS — I48.20 CHRONIC ATRIAL FIBRILLATION (H): ICD-10-CM

## 2022-06-07 DIAGNOSIS — I48.21 PERMANENT ATRIAL FIBRILLATION (H): Primary | ICD-10-CM

## 2022-06-07 LAB — INR (EXTERNAL): 2 (ref 0.9–1.1)

## 2022-06-07 NOTE — PROGRESS NOTES
ANTICOAGULATION MANAGEMENT     Savanna Rehman 81 year old female is on warfarin with therapeutic INR result. (Goal INR 2.0-3.0)    Recent labs: (last 7 days)     06/07/22  1212   INR 2.0*       ASSESSMENT       Source(s): Chart Review and Home Care/Facility Nurse       Warfarin doses taken: Warfarin taken as instructed    Diet: No new diet changes identified    New illness, injury, or hospitalization: Yes: UTI patient does not feel it is getting better    Medication/supplement changes: cephalaxin and vancomycin started back on 6/3    Signs or symptoms of bleeding or clotting: No    Previous INR: Therapeutic last visit; previously outside of goal range    Additional findings: None       PLAN     Recommended plan for temporary change(s) affecting INR     Dosing Instructions: continue your current warfarin dose with next INR in 1 week       Summary  As of 6/7/2022    Full warfarin instructions:  3.75 mg every Mon, Wed, Fri; 2.5 mg all other days   Next INR check:  6/14/2022             Telephone call with Raj home care/facility nurse who verbalizes understanding and agrees to plan    Orders given to  Homecare nurse/facility to recheck    Education provided: to call into [facility] if the antibiotic for the UTI is changed to verify if there is an interaction.    Plan made per [facility] anticoagulation protocol    Mahsa Scott RN  Anticoagulation Clinic  6/7/2022    _______________________________________________________________________     Anticoagulation Episode Summary     Current INR goal:  2.0-3.0   TTR:  78.2 % (1 y)   Target end date:  Indefinite   Send INR reminders to:  NIKKI CHASE    Indications    Permanent atrial fibrillation (H) [I48.21]  Chronic atrial fibrillation (H) [I48.20]  Long term (current) use of anticoagulants [Z79.01]           Comments:  Home care          Anticoagulation Care Providers     Provider Role Specialty Phone number    Patti Suárez MD Referring Internal  Medicine [phone]

## 2022-06-07 NOTE — TELEPHONE ENCOUNTER
Patient calls to get lab results and reports that the antibiotic for her bladder infection is not helping much. She has 2 1/5 days remaining to complete the rx.     Best number to call back [phone]     FYI only She dd not request a call back

## 2022-06-08 ENCOUNTER — OFFICE VISIT (OUTPATIENT)
Dept: UROLOGY | Facility: CLINIC | Age: 82
End: 2022-06-08
Payer: COMMERCIAL

## 2022-06-08 ENCOUNTER — TELEPHONE (OUTPATIENT)
Dept: UROLOGY | Facility: CLINIC | Age: 82
End: 2022-06-08

## 2022-06-08 VITALS
HEART RATE: 73 BPM | BODY MASS INDEX: 38.34 KG/M2 | DIASTOLIC BLOOD PRESSURE: 77 MMHG | SYSTOLIC BLOOD PRESSURE: 146 MMHG | HEIGHT: 68 IN | WEIGHT: 253 LBS

## 2022-06-08 DIAGNOSIS — A04.72 C. DIFFICILE COLITIS: ICD-10-CM

## 2022-06-08 DIAGNOSIS — N39.41 URGENCY INCONTINENCE: ICD-10-CM

## 2022-06-08 DIAGNOSIS — E66.01 MORBID OBESITY (H): ICD-10-CM

## 2022-06-08 DIAGNOSIS — E11.65 TYPE 2 DIABETES MELLITUS WITH HYPERGLYCEMIA, WITHOUT LONG-TERM CURRENT USE OF INSULIN (H): ICD-10-CM

## 2022-06-08 DIAGNOSIS — B37.2 CANDIDIASIS OF SKIN: ICD-10-CM

## 2022-06-08 DIAGNOSIS — N32.81 URGENCY-FREQUENCY SYNDROME: ICD-10-CM

## 2022-06-08 DIAGNOSIS — R15.1 FECAL SMEARING: ICD-10-CM

## 2022-06-08 DIAGNOSIS — I48.20 CHRONIC ATRIAL FIBRILLATION (H): ICD-10-CM

## 2022-06-08 DIAGNOSIS — N39.0 RECURRENT UTI: Primary | ICD-10-CM

## 2022-06-08 DIAGNOSIS — R35.0 URINARY FREQUENCY: Primary | ICD-10-CM

## 2022-06-08 LAB
ALBUMIN UR-MCNC: NEGATIVE MG/DL
APPEARANCE UR: CLEAR
BILIRUB UR QL STRIP: NEGATIVE
COLOR UR AUTO: YELLOW
GLUCOSE UR STRIP-MCNC: NEGATIVE MG/DL
HGB UR QL STRIP: NEGATIVE
KETONES UR STRIP-MCNC: NEGATIVE MG/DL
LEUKOCYTE ESTERASE UR QL STRIP: NEGATIVE
NITRATE UR QL: NEGATIVE
PH UR STRIP: 5 [PH] (ref 5–7)
RESIDUAL VOLUME (RV) (EXTERNAL): 17
SP GR UR STRIP: 1.02 (ref 1–1.03)
UROBILINOGEN UR STRIP-ACNC: 0.2 E.U./DL

## 2022-06-08 PROCEDURE — 51798 US URINE CAPACITY MEASURE: CPT | Performed by: UROLOGY

## 2022-06-08 PROCEDURE — 99214 OFFICE O/P EST MOD 30 MIN: CPT | Mod: 25 | Performed by: UROLOGY

## 2022-06-08 PROCEDURE — 81003 URINALYSIS AUTO W/O SCOPE: CPT | Mod: QW | Performed by: UROLOGY

## 2022-06-08 RX ORDER — CEFDINIR 300 MG/1
300 CAPSULE ORAL 2 TIMES DAILY
Qty: 10 CAPSULE | Refills: 0 | Status: SHIPPED | OUTPATIENT
Start: 2022-06-08 | End: 2022-09-20

## 2022-06-08 RX ORDER — ESTRADIOL 0.1 MG/G
CREAM VAGINAL
Qty: 42.5 G | Refills: 3 | Status: SHIPPED | OUTPATIENT
Start: 2022-06-08 | End: 2023-01-17

## 2022-06-08 RX ORDER — MIRABEGRON 25 MG/1
25 TABLET, FILM COATED, EXTENDED RELEASE ORAL DAILY
Qty: 30 TABLET | Refills: 3 | Status: SHIPPED | OUTPATIENT
Start: 2022-06-08 | End: 2023-02-06

## 2022-06-08 ASSESSMENT — PAIN SCALES - GENERAL: PAINLEVEL: MODERATE PAIN (4)

## 2022-06-08 NOTE — TELEPHONE ENCOUNTER
[facility] Call Center    Phone Message    May a detailed message be left on voicemail: yes     Reason for Call: Medication Question or concern regarding medication   Prescription Clarification  Name of Medication: mirabegron (MYRBETRIQ) 25 MG 24 hr tablet   Prescribing Provider: Dr. Wilson   Pharmacy: [facility] [address] [place] [address]   What on the order needs clarification? Pt states that provider didn't know the cost of the medication above, pt states its $200 and with insurance it wouldn't cover it, pt stated there was another medication that she could recommend that could be cheaper, pt is wanting that other medication sent to pharmacy, please call pt to further discuss, Thanks!          Action Taken: Message routed to:  Other: Uro    Travel Screening: Not Applicable

## 2022-06-08 NOTE — NURSING NOTE
Chief Complaint   Patient presents with     Recurrent UTIs     Incontinence     urgency   Roya Christopher LPN

## 2022-06-08 NOTE — PATIENT INSTRUCTIONS
Daily fiber supplement that you mix in the water    Stay well hydrated-work on drinking water    Restart the estrogen cream    Stop the oxybutynin and try the mirabegron.  Monitor you blood pressure after your start the medication    If you think you have a UTI please contact clinic to schedule a CATHETERIZED urine specimen [phone]    Websites with free information:    [employer]ro[employer] patient website: www.voicesforpfd.org    Total Control Program: www.totalcontrolprogram.com    Supplements to prevent UTI to consider  -Probiotics  -Cranberry (for these products let them know a doctor is recommending them)   Ellura: www.myellura.com   Theracran HP by [employer] [facility] [address]  -d-mannose 2gm daily  -Vitamin C 500-1000mg twice a day    It was a pleasure meeting with you today.  Thank you for allowing me and my team the privilege of caring for you today.  YOU are the reason we are here, and I truly hope we provided you with the excellent service you deserve.  Please let us know if there is anything else we can do for you so that we can be sure you are leaving completely satisfied with your care experience.

## 2022-06-08 NOTE — TELEPHONE ENCOUNTER
She said you had mentioned an alternative  Drug at her visit today ? Myrbetriq is too expensive for her

## 2022-06-08 NOTE — PROGRESS NOTES
June 8, 2022    Savanna was seen today for recurrent utis and incontinence.    Diagnoses and all orders for this visit:    Recurrent UTI  -     UA without Microscopic [[id number]]; Future  -     MEASURE POST-VOID RESIDUAL URINE/BLADDER CAPACITY, US NON-IMAGING (22877)  -     UA without Microscopic [[id number]]  -     estradiol (ESTRACE) 0.1 MG/GM vaginal cream; Please use your finger to place a large blueberry size amount in the vagina nightly for two weeks and then three times a week at night thereafter    Urgency-frequency syndrome    Chronic atrial fibrillation (H)    Morbid obesity (H)    Candidiasis of skin    Type 2 diabetes mellitus with hyperglycemia, without long-term current use of insulin (H)    C. difficile colitis, recurrent -- she needs to take Vanco 125 bid anytime she is on a different Abx     Fecal smearing     At this time she has a lot of issues that can contribute to her symptoms.  We discussed that she is on the appropriate antibiotics and that her urine dip today is completely normal.  Discussed that this would  suggest to me that her urinary symptoms are not related to infection and given her history of C diff must be very careful in attributing symptoms to UTI.  Continue estrace cream and this is refilled    We furthermore discussed that if she is having memory issues that I would want her to stop the oxybutynin and that we would try a beta agonist.    RTC 3 months to reassess symptoms.  Discussed with patient prior to any further intervention for her incontinence I would recommend UDS but she is not ready for this at this time    28 minutes were spent today on the day of the encounter in reviewing the EMR including urinalysis and urine culture, direct patient care including prescription medications, coordination of care and documentation    Deisy Wilson MD MPH  (she/her/hers)   of Urology  [facility]    Subjective    Last seen for a virtual visit 7/2021. Today she  "brings her daughter in with her who is also a nurse.    Patient has been continuing to have issues with urinary incontinence and infections.  She has been working with ID for this.  Recent urine culture sent by PCP shows that patient is on the appropriate antibiotics but she states that she does not think it is helping.  She also seems to be having  She denies any changes in health since last visit    BP (!) 146/77 (BP Location: Left arm, Patient Position: Sitting, Cuff Size: Adult Regular)   Pulse 73   Ht 1.727 m (5' 8\")   Wt 114.8 kg (253 lb)   LMP  (LMP Unknown)   BMI 38.47 kg/m    GENERAL: healthy, alert and no distress  EYES: Eyes grossly normal to inspection, conjunctivae and sclerae normal  HENT: normal cephalic/atraumatic.  External ears, nose and mouth without ulcers or lesions.  RESP: no audible wheeze, cough, or visible cyanosis.  No visible retractions or increased work of breathing.  Able to speak fully in complete sentences.  NEURO: Cranial nerves grossly intact, mentation intact and speech normal  PSYCH: mentation appears normal, affect normal/bright, judgement and insight intact, normal speech and appearance well-groomed    PVR 17mL by bladder scan    UCx 5/26/22 with pan sensitive E coli  Urinalysis 5/26/22 with few squamous epis and 10-25 WBC    Urine dip today negative    CC  Patient Care Team:  Patti Suárez MD as PCP - General (Internal Medicine)  Patti Suárez MD as Assigned PCP  Patti Suárez MD as Referring Physician (Internal Medicine)  Deisy Wilson MD as MD (Urology)  Adelaida Liu, RN as Specialty Care Coordinator (Urology)  Deisy Wilson MD as Assigned Surgical Provider  Mohan Kenny MD as Assigned Heart and Vascular Provider  Anjali Rabago MD as Assigned Pulmonology Provider  Jeannette Modi MD as Assigned Sleep Provider        "

## 2022-06-08 NOTE — TELEPHONE ENCOUNTER
"Advised patient that new rx for Omnicef was sent to pharmacy for her.  Patient states she is not going to take it.  She saw Dr. Wilson today and they \"developed a game plan\".  She is to stop oxybutynin as it can cause \"brain fog\", may try something else later but not for now.  She is not to take any antibiotics right now and she will see Dr. Wilson for any bladder issues from now on and she will also see Dr. Darwin VINCENT also.  Patient wants to make sure that Dr. Lauren knows that it is not a matter of trust or doubt that she will see urology exclusively for bladder issues and try to figure out how best to treat or control UTIs and UTI type symptoms. \"I have a specialist, I may as well use her\".    States she will see Dr. Lauren for everything else and has a physical scheduled in July.  BENITA Graham R.N.    "

## 2022-06-08 NOTE — TELEPHONE ENCOUNTER
I sent the new prescription for antibiotic, Omnicef.  Patient has extensive antibiotic allergies, no options left to choose

## 2022-06-08 NOTE — LETTER
6/8/2022       RE: Savanna Rehman  [address]  [facility] [address]     Dear Colleague,    Thank you for referring your patient, Savanna Rehman, to the [facility] at [facility]. Please see a copy of my visit note below.    June 8, 2022    Savanna was seen today for recurrent utis and incontinence.    Diagnoses and all orders for this visit:    Recurrent UTI  -     UA without Microscopic [[id number]]; Future  -     MEASURE POST-VOID RESIDUAL URINE/BLADDER CAPACITY, US NON-IMAGING ([id number])  -     UA without Microscopic [[id number]]  -     estradiol (ESTRACE) 0.1 MG/GM vaginal cream; Please use your finger to place a large blueberry size amount in the vagina nightly for two weeks and then three times a week at night thereafter    Urgency-frequency syndrome    Chronic atrial fibrillation (H)    Morbid obesity (H)    Candidiasis of skin    Type 2 diabetes mellitus with hyperglycemia, without long-term current use of insulin (H)    C. difficile colitis, recurrent -- she needs to take Vanco 125 bid anytime she is on a different Abx     Fecal smearing     At this time she has a lot of issues that can contribute to her symptoms.  We discussed that she is on the appropriate antibiotics and that her urine dip today is completely normal.  Discussed that this would  suggest to me that her urinary symptoms are not related to infection and given her history of C diff must be very careful in attributing symptoms to UTI.  Continue estrace cream and this is refilled    We furthermore discussed that if she is having memory issues that I would want her to stop the oxybutynin and that we would try a beta agonist.    RTC 3 months to reassess symptoms.  Discussed with patient prior to any further intervention for her incontinence I would recommend UDS but she is not ready for this at this time    28 minutes were spent today on the day of the encounter  "in reviewing the EMR including urinalysis and urine culture, direct patient care including prescription medications, coordination of care and documentation    Deisy Wilson MD MPH  (she/her/hers)   of Urology  [facility]    Subjective    Last seen for a virtual visit 7/2021. Today she brings her daughter in with her who is also a nurse.    Patient has been continuing to have issues with urinary incontinence and infections.  She has been working with ID for this.  Recent urine culture sent by PCP shows that patient is on the appropriate antibiotics but she states that she does not think it is helping.  She also seems to be having  She denies any changes in health since last visit    BP (!) 146/77 (BP Location: Left arm, Patient Position: Sitting, Cuff Size: Adult Regular)   Pulse 73   Ht 1.727 m (5' 8\")   Wt 114.8 kg (253 lb)   LMP  (LMP Unknown)   BMI 38.47 kg/m    GENERAL: healthy, alert and no distress  EYES: Eyes grossly normal to inspection, conjunctivae and sclerae normal  HENT: normal cephalic/atraumatic.  External ears, nose and mouth without ulcers or lesions.  RESP: no audible wheeze, cough, or visible cyanosis.  No visible retractions or increased work of breathing.  Able to speak fully in complete sentences.  NEURO: Cranial nerves grossly intact, mentation intact and speech normal  PSYCH: mentation appears normal, affect normal/bright, judgement and insight intact, normal speech and appearance well-groomed    PVR 17mL by bladder scan    UCx 5/26/22 with pan sensitive E coli  Urinalysis 5/26/22 with few squamous epis and 10-25 WBC    Urine dip today negative    CC  Patient Care Team:  Patti Suárez MD as PCP - General (Internal Medicine)  Patti Suárez MD as Assigned PCP  Patti Suárez MD as Referring Physician (Internal Medicine)  Deisy Wilson MD as MD (Urology)  Adelaida Liu, RN as Specialty Care " Coordinator (Urology)  Deisy Wilson MD as Assigned Surgical Provider  Mohan Kenny MD as Assigned Heart and Vascular Provider  Anjali Rabago MD as Assigned Pulmonology Provider  Jeannette Modi MD as Assigned Sleep Provider

## 2022-06-09 ENCOUNTER — TELEPHONE (OUTPATIENT)
Dept: UROLOGY | Facility: CLINIC | Age: 82
End: 2022-06-09
Payer: COMMERCIAL

## 2022-06-09 NOTE — TELEPHONE ENCOUNTER
Prior Authorization Retail Medication Request    Medication/Dose: MIRABEGRON  ICD code (if different than what is on RX):  SEE NOTE  Previously Tried and Failed:  SEE NOTE  Rationale:  SEE CHART    Insurance Name:  SEE CHART  Insurance ID:  SEE CHART      Pharmacy Information (if different than what is on RX)  Name:  SEE [facility]  Phone:  SEE CHART              ----- Message from Deisy Wilson MD sent at 6/9/2022  9:30 AM CDT -----  Regarding: RE: pt will not pay for GEMTESA 75mg  She cannot be on the other medications because of cognitive issues    We need to have the PA team look at getting her mirabegron    C  ----- Message -----  From: Celestino Monaco CMA  Sent: 6/9/2022   9:05 AM CDT  To: Deisy Wilson MD  Subject: pt will not pay for GEMTESA 75mg                 Good morning.   A fax came thur stating pt will not pay for this med:  Gemtesa 75mg #30 one tab daily.  Note from pharmacy says:  This is over $500 and pt will not pay.  Insurance prefers oxybutynin or tolteridine.    Would you like me to send either of those meds?    Celestino minor

## 2022-06-09 NOTE — TELEPHONE ENCOUNTER
I SENT A PA REQUEST.  Deisy Avendaño CMA See MD Jacinda Morales Darah, CMA  She cannot be on the other medications because of cognitive issues     We need to have the PA team look at getting her mirabegron     C             Previous Messages       ----- Message -----   From: Celestino Monaco CMA   Sent: 6/9/2022   9:05 AM CDT   To: Deisy See Andrew Wilson MD   Subject: pt will not pay for GEMTESA 75mg                 Good morning.    A fax came thur stating pt will not pay for this med:   Gemtesa 75mg #30 one tab daily.   Note from pharmacy says:   This is over $500 and pt will not pay.   Insurance prefers oxybutynin or tolteridine.     Would you like me to send either of those meds?     Celestino minor

## 2022-06-09 NOTE — TELEPHONE ENCOUNTER
Please inform the patient that I am very glad she has a plan with , so we avoid so many antibiotics treatment

## 2022-06-13 NOTE — TELEPHONE ENCOUNTER
Prior Authorization Not Needed per Insurance        Medication: MIRABEGRON-PA NOT NEEDED   Insurance Company: JACKI/[employer] - Phone [phone] Fax [phone]  Expected CoPay: $199    Pharmacy Filling the Rx: [facility] [address] [place] [address]  Pharmacy Notified: Yes  Patient Notified: No    Called pharmacy and pharmacy stated that PA is Not Needed and medication is covered. Pharmacy stated that they have a paid claim on medication quantity 30 for 30 day supply; however, co-pay is high. Pharmacy stated that the spoke to patient on coverage and cost for medication and patient did no want to  medication due to cost. Cash price for Quantity 30 is $437.44. Pharmacy stated that patients insurance states preferred alternatives are: Oxybutynin and Tolterodine. ** Be Advised: pharmacy stated that they do not know what patients actual co-pay/cost of preferred medications for Oxybutynin and Tolterodine until script is received and billed to patients insurance.** Insurance also stated that PA is Not Needed and medication is covered as noted above.

## 2022-06-14 ENCOUNTER — ANTICOAGULATION THERAPY VISIT (OUTPATIENT)
Dept: ANTICOAGULATION | Facility: CLINIC | Age: 82
End: 2022-06-14
Payer: COMMERCIAL

## 2022-06-14 DIAGNOSIS — I48.21 PERMANENT ATRIAL FIBRILLATION (H): Primary | ICD-10-CM

## 2022-06-14 DIAGNOSIS — I48.20 CHRONIC ATRIAL FIBRILLATION (H): ICD-10-CM

## 2022-06-14 DIAGNOSIS — Z79.01 LONG TERM (CURRENT) USE OF ANTICOAGULANTS: ICD-10-CM

## 2022-06-14 LAB — INR (EXTERNAL): 1.8 (ref 0.9–1.1)

## 2022-06-14 NOTE — PROGRESS NOTES
ANTICOAGULATION MANAGEMENT     Savanna Rehman 81 year old female is on warfarin with subtherapeutic INR result. (Goal INR 2.0-3.0)    Recent labs: (last 7 days)     06/14/22  1312   INR 1.8*       ASSESSMENT       Source(s): Chart Review and Home Care/Facility Nurse       Warfarin doses taken: Warfarin taken as instructed    Diet: No new diet changes identified    New illness, injury, or hospitalization: No    Medication/supplement changes: None noted    Signs or symptoms of bleeding or clotting: No    Previous INR: Therapeutic last 2(+) visits    Additional findings: None       PLAN     Recommended plan for no diet, medication or health factor changes affecting INR     Dosing Instructions: Increase your warfarin dose (6% change) with next INR in 1 week       Summary  As of 6/14/2022    Full warfarin instructions:  2.5 mg every Sun, Thu, Sat; 3.75 mg all other days   Next INR check:  6/21/2022             Telephone call with Raj home care/facility nurse who agrees to plan and repeated back plan correctly    Orders given to  Homecare nurse/facility to recheck    Education provided: Please call back if any changes to your diet, medications or how you've been taking warfarin    Plan made per [facility] anticoagulation protocol    Azul Whitaker, RN  Anticoagulation Clinic  6/14/2022    _______________________________________________________________________     Anticoagulation Episode Summary     Current INR goal:  2.0-3.0   TTR:  78.2 % (1 y)   Target end date:  Indefinite   Send INR reminders to:  EDISON SALVATORE    Indications    Permanent atrial fibrillation (H) [I48.21]  Chronic atrial fibrillation (H) [I48.20]  Long term (current) use of anticoagulants [Z79.01]           Comments:  Home care          Anticoagulation Care Providers     Provider Role Specialty Phone number    Patti Suárez MD Referring Internal Medicine [phone]

## 2022-06-15 ENCOUNTER — TELEPHONE (OUTPATIENT)
Dept: UROLOGY | Facility: CLINIC | Age: 82
End: 2022-06-15

## 2022-06-15 NOTE — TELEPHONE ENCOUNTER
Prior Authorization Retail Medication Request    Medication/Dose: Vibegron 75 MG TABS  ICD code (if different than what is on RX):  Urinary frequency [R35.0]   Previously Tried and Failed:    Rationale:      Insurance Name:  [employer]  Insurance ID:  [id number]    Pharmacy Information (if different than what is on RX)  Name:  [facility] [address] [place] [address]  Phone:  [phone]

## 2022-06-16 NOTE — TELEPHONE ENCOUNTER
Central Prior Authorization Team   Phone: [phone]    PA Initiation    Medication: Vibegron 75 MG TABS  Insurance Company: [employer] - Phone [phone] Fax [phone]  Pharmacy Filling the Rx: [facility] [address] [place] [address]  Filling Pharmacy Phone: [phone]  Filling Pharmacy Fax: [phone]  Start Date: 6/16/2022

## 2022-06-16 NOTE — TELEPHONE ENCOUNTER
Prior Authorization Approval    Authorization Effective Date: 6/17/2022  Authorization Expiration Date: 6/16/2023  Medication: Vibegron 75 MG TABS-PA APPROVED  Approved Dose/Quantity:  Reference #:     Insurance Company: YANCYMARIA E/[employer] - Phone [phone] Fax [phone]  Expected CoPay: $304.55       CoPay Card Available:      Foundation Assistance Needed:    Which Pharmacy is filling the prescription (Not needed for infusion/clinic administered): [facility] [address] [place] [address]  Pharmacy Notified: Yes- **Instructed pharmacy to notify patient when script is ready to /ship.**- Called pharmacy and Nora stated that patient is aware of coverage on medication and cost. Pharmacy states that co-pay is high possibly due to deductible and/or coverage gap. Nora stated that patient will reach out to her provider on other options/alternative. Patient did not want to  medication due to cost. Cash price Quantity 30 is $558.33  Patient Notified: Yes

## 2022-06-21 ENCOUNTER — ANTICOAGULATION THERAPY VISIT (OUTPATIENT)
Dept: ANTICOAGULATION | Facility: CLINIC | Age: 82
End: 2022-06-21
Payer: COMMERCIAL

## 2022-06-21 DIAGNOSIS — I48.21 PERMANENT ATRIAL FIBRILLATION (H): Primary | ICD-10-CM

## 2022-06-21 DIAGNOSIS — I48.20 CHRONIC ATRIAL FIBRILLATION (H): ICD-10-CM

## 2022-06-21 DIAGNOSIS — Z79.01 LONG TERM (CURRENT) USE OF ANTICOAGULANTS: ICD-10-CM

## 2022-06-21 LAB — INR (EXTERNAL): 2 (ref 0.9–1.1)

## 2022-06-21 NOTE — PROGRESS NOTES
ANTICOAGULATION MANAGEMENT     Savanna Rehman 81 year old female is on warfarin with therapeutic INR result. (Goal INR 2.0-3.0)    Recent labs: (last 7 days)     06/21/22  0000   INR 2.0*       ASSESSMENT       Source(s): Chart Review and Home Care/Facility Nurse       Warfarin doses taken: Warfarin taken as instructed    Diet: Change in alcohol intake may be affecting INR. patient had three drinks last week over the week     New illness, injury, or hospitalization: No    Medication/supplement changes: None noted    Signs or symptoms of bleeding or clotting: No    Previous INR: Subtherapeutic    Additional findings: None       PLAN     Recommended plan for no diet, medication or health factor changes affecting INR     Dosing Instructions: continue your current warfarin dose with next INR in 1 week       Summary  As of 6/21/2022    Full warfarin instructions:  2.5 mg every Sun, Thu, Sat; 3.75 mg all other days   Next INR check:  6/28/2022             Telephone call with Raj home care/facility nurse who agrees to plan and repeated back plan correctly    Orders given to  Homecare nurse/facility to recheck    Education provided: Please call back if any changes to your diet, medications or how you've been taking warfarin    Plan made per [facility] anticoagulation protocol    Roya Sky, RN  Anticoagulation Clinic  6/21/2022    _______________________________________________________________________     Anticoagulation Episode Summary     Current INR goal:  2.0-3.0   TTR:  76.4 % (1 y)   Target end date:  Indefinite   Send INR reminders to:  NIKKI CHASE    Indications    Permanent atrial fibrillation (H) [I48.21]  Chronic atrial fibrillation (H) [I48.20]  Long term (current) use of anticoagulants [Z79.01]           Comments:  Home care          Anticoagulation Care Providers     Provider Role Specialty Phone number    Patti Suárez MD Referring Internal Medicine [phone]

## 2022-06-22 ENCOUNTER — TELEPHONE (OUTPATIENT)
Dept: UROLOGY | Facility: CLINIC | Age: 82
End: 2022-06-22

## 2022-06-22 ENCOUNTER — TELEPHONE (OUTPATIENT)
Dept: INTERNAL MEDICINE | Facility: CLINIC | Age: 82
End: 2022-06-22

## 2022-06-22 NOTE — TELEPHONE ENCOUNTER
Central Prior Authorization Team   Phone: [phone]    PA Initiation    Medication: mirabegron (MYRBETRIQ) 25 MG 24 hr tablet-(TIER EXCEPTION)  Insurance Company: J[employer] - Phone [phone] Fax [phone]  Pharmacy Filling the Rx: [facility] [address] [place] [address]  Filling Pharmacy Phone: [phone]  Filling Pharmacy Fax: [phone]  Start Date: 6/22/2022    Initiate PA by phone at [phone]. Case # [id number]

## 2022-06-22 NOTE — TELEPHONE ENCOUNTER
Prior Authorization Retail Medication Request    Medication/Dose: mirabegron (MYRBETRIQ) 25 MG 24 hr tablet-(TIER EXCEPTION)  ICD code (if different than what is on RX):    Previously Tried and Failed:    Rationale:      Insurance Name: J[employer]    Insurance ID: [id number]        Pharmacy Information (if different than what is on RX)  Name: [facility] [address] [place] [address]     Phone:  [phone]

## 2022-06-22 NOTE — TELEPHONE ENCOUNTER
[facility] * order received via fax    Forms in [employer] mail box for review and signature.

## 2022-06-27 ENCOUNTER — MEDICAL CORRESPONDENCE (OUTPATIENT)
Dept: INTERNAL MEDICINE | Facility: CLINIC | Age: 82
End: 2022-06-27

## 2022-06-28 ENCOUNTER — ANTICOAGULATION THERAPY VISIT (OUTPATIENT)
Dept: ANTICOAGULATION | Facility: CLINIC | Age: 82
End: 2022-06-28

## 2022-06-28 DIAGNOSIS — I48.21 PERMANENT ATRIAL FIBRILLATION (H): Primary | ICD-10-CM

## 2022-06-28 DIAGNOSIS — I48.20 CHRONIC ATRIAL FIBRILLATION (H): ICD-10-CM

## 2022-06-28 DIAGNOSIS — Z79.01 LONG TERM (CURRENT) USE OF ANTICOAGULANTS: ICD-10-CM

## 2022-06-28 LAB — INR (EXTERNAL): 2.5 (ref 0.9–1.1)

## 2022-06-28 NOTE — TELEPHONE ENCOUNTER
PRIOR AUTHORIZATION DENIED    Medication: mirabegron (MYRBETRIQ) 25 MG 24 hr tablet-(TIER EXCEPTION)-PA TIER EXCEPTION DENIED     Denial Date: 6/22/2022    Denial Rational:         Appeal Information:

## 2022-06-28 NOTE — PROGRESS NOTES
ANTICOAGULATION MANAGEMENT     Savanna Rehman 81 year old female is on warfarin with therapeutic INR result. (Goal INR 2.0-3.0)    Recent labs: (last 7 days)     06/28/22  1305   INR 2.5*       ASSESSMENT       Source(s): Chart Review and Home Care/Facility Nurse       Warfarin doses taken: Warfarin taken as instructed    Diet: No new diet changes identified    New illness, injury, or hospitalization: No    Medication/supplement changes: None noted    Signs or symptoms of bleeding or clotting: No    Previous INR: Therapeutic last 2(+) visits    Additional findings: None       PLAN     Recommended plan for no diet, medication or health factor changes affecting INR     Dosing Instructions: continue your current warfarin dose with next INR in 1 week       Summary  As of 6/28/2022    Full warfarin instructions:  2.5 mg every Sun, Thu, Sat; 3.75 mg all other days   Next INR check:  7/5/2022             Telephone call with Raj home care/facility nurse who verbalizes understanding and agrees to plan    Orders given to  Homecare nurse/facility to recheck    Education provided: Contact [phone]  with any changes, questions or concerns.     Plan made per [facility] anticoagulation protocol    Sanjana Fox RN  Anticoagulation Clinic  6/28/2022    _______________________________________________________________________     Anticoagulation Episode Summary     Current INR goal:  2.0-3.0   TTR:  76.4 % (1 y)   Target end date:  Indefinite   Send INR reminders to:  NIKKI CHASE    Indications    Permanent atrial fibrillation (H) [I48.21]  Chronic atrial fibrillation (H) [I48.20]  Long term (current) use of anticoagulants [Z79.01]           Comments:  Home care          Anticoagulation Care Providers     Provider Role Specialty Phone number    Patti Suárez MD Referring Internal Medicine [phone]

## 2022-06-29 DIAGNOSIS — N39.0 URINARY TRACT INFECTION, SITE NOT SPECIFIED: Primary | ICD-10-CM

## 2022-07-01 DIAGNOSIS — E11.42 DIABETIC POLYNEUROPATHY ASSOCIATED WITH TYPE 2 DIABETES MELLITUS (H): ICD-10-CM

## 2022-07-03 RX ORDER — GLIPIZIDE 2.5 MG/1
TABLET, EXTENDED RELEASE ORAL
Qty: 180 TABLET | Refills: 0 | Status: SHIPPED | OUTPATIENT
Start: 2022-07-03 | End: 2022-10-06

## 2022-07-05 ENCOUNTER — ANTICOAGULATION THERAPY VISIT (OUTPATIENT)
Dept: ANTICOAGULATION | Facility: CLINIC | Age: 82
End: 2022-07-05
Payer: COMMERCIAL

## 2022-07-05 ENCOUNTER — NURSE TRIAGE (OUTPATIENT)
Dept: INTERNAL MEDICINE | Facility: CLINIC | Age: 82
End: 2022-07-05

## 2022-07-05 DIAGNOSIS — I48.20 CHRONIC ATRIAL FIBRILLATION (H): ICD-10-CM

## 2022-07-05 DIAGNOSIS — N39.0 URINARY TRACT INFECTION, SITE NOT SPECIFIED: ICD-10-CM

## 2022-07-05 DIAGNOSIS — Z79.01 LONG TERM (CURRENT) USE OF ANTICOAGULANTS: ICD-10-CM

## 2022-07-05 DIAGNOSIS — I48.21 PERMANENT ATRIAL FIBRILLATION (H): Primary | ICD-10-CM

## 2022-07-05 LAB
ALBUMIN UR-MCNC: NEGATIVE MG/DL
APPEARANCE UR: CLEAR
BACTERIA #/AREA URNS HPF: ABNORMAL /HPF
BILIRUB UR QL STRIP: NEGATIVE
COLOR UR AUTO: YELLOW
GLUCOSE UR STRIP-MCNC: NEGATIVE MG/DL
HGB UR QL STRIP: ABNORMAL
INR (EXTERNAL): 2.8 (ref 0.9–1.1)
KETONES UR STRIP-MCNC: NEGATIVE MG/DL
LEUKOCYTE ESTERASE UR QL STRIP: ABNORMAL
NITRATE UR QL: POSITIVE
PH UR STRIP: 5.5 [PH] (ref 5–7)
RBC #/AREA URNS AUTO: ABNORMAL /HPF
SP GR UR STRIP: 1.01 (ref 1–1.03)
SQUAMOUS #/AREA URNS AUTO: ABNORMAL /LPF
UROBILINOGEN UR STRIP-ACNC: 0.2 E.U./DL
WBC #/AREA URNS AUTO: ABNORMAL /HPF

## 2022-07-05 PROCEDURE — 81001 URINALYSIS AUTO W/SCOPE: CPT

## 2022-07-05 PROCEDURE — 87086 URINE CULTURE/COLONY COUNT: CPT | Performed by: INTERNAL MEDICINE

## 2022-07-05 NOTE — TELEPHONE ENCOUNTER
"Nurse Triage SBAR    Is this a 2nd Level Triage? YES, LICENSED PRACTITIONER REVIEW IS REQUIRED    Situation: Low BP, feeling lightheaded    Background:   No hx of HTN, no meds for HTN  LOV: 6/2/2022     Assessment: Patient had nurse come today 11:30-1. Took BP on patient's machine. 98/48. Patient took BP again while on call - 101/52, pulse 57.   Patient sitting in chair at time of call and feels lightheaded.     Protocol Recommended Disposition:   Go To ED/UCC Now (Or To Office With PCP Approval)    Recommendation: Advised to lay down and put feet up on some pillows until she gets a call back from the clinic.      Routed to provider    Does the patient meet one of the following criteria for ADS visit consideration? 16+ years old, with an [facility] PCP     TIP  Providers, please consider if this condition is appropriate for management at one of our Acute and Diagnostic Services sites.     If patient is a good candidate, please use dotphrase <dot>triageresponse and select Refer to ADS to document.      Reason for Disposition    Fall in systolic BP > 20 mm Hg from normal and feeling dizzy, lightheaded, or weak    Answer Assessment - Initial Assessment Questions  1. BLOOD PRESSURE: \"What is the blood pressure?\" \"Did you take at least two measurements 5 minutes apart?\"      Nurse came today at 11:30-1 - 98/48 - using patient's machine.      Daughter is an RN - retired    2. ONSET: \"When did you take your blood pressure?\"       Today with nurse. Then during call - 101/52, pulse of 57    3. HOW: \"How did you obtain the blood pressure?\" (e.g., visiting nurse, automatic home BP monitor)      Nurse came today    4. HISTORY: \"Do you have a history of low blood pressure?\" \"What is your blood pressure normally?\"      No. Stressed and upset about the [employer] shooting in [place]. Patient used to live has a friend that lives there.     5. MEDICATIONS: \"Are you taking any medications for blood pressure?\" If yes: \"Have they been changed " "recently?\"      No     6. PULSE RATE: \"Do you know what your pulse rate is?\"       77    7. OTHER SYMPTOMS: \"Have you been sick recently?\" \"Have you had a recent injury?\"      Lightheaded, headache. Getting migraines over the last month. Lightheadedness has gone away after being on oxybutynin in early June.   Lightheadness yesterday and today. Lightheaded at rest. Pills for dizziness.       UTI - told to never take an abx.       C.Diff - been on a lot of abx since October. Bad right now. Infectious Disease appt tomorrow.     8. PREGNANCY: \"Is there any chance you are pregnant?\" \"When was your last menstrual period?\"      n/a    Protocols used: LOW BLOOD PRESSURE-A-OH      "

## 2022-07-05 NOTE — TELEPHONE ENCOUNTER
Patient doesn't take BP meds  Most likely she is dehydrated on this hot weather  I recommend to eat salty food ( pretzel ) and drink plenty of water.    If the symptoms persists she will need eval

## 2022-07-05 NOTE — PROGRESS NOTES
ANTICOAGULATION MANAGEMENT     Savanna Rehman 81 year old female is on warfarin with therapeutic INR result. (Goal INR 2.0-3.0)    Recent labs: (last 7 days)     07/05/22  1152   INR 2.8*       ASSESSMENT       Source(s): Chart Review and Home Care/Facility Nurse       Warfarin doses taken: Warfarin taken as instructed    Diet: No new diet changes identified    New illness, injury, or hospitalization: No    Medication/supplement changes: None noted    Signs or symptoms of bleeding or clotting: No    Previous INR: Therapeutic last 2(+) visits    Additional findings: None       PLAN     Recommended plan for no diet, medication or health factor changes affecting INR     Dosing Instructions: continue your current warfarin dose with next INR in 2 weeks       Summary  As of 7/5/2022    Full warfarin instructions:  2.5 mg every Tue, Thu, Sat; 3.75 mg all other days   Next INR check:  7/19/2022             Telephone call with Evelin home care/facility nurse who verbalizes understanding and agrees to plan    Patient to recheck with home meter    Education provided: Goal range and significance of current result    Plan made per [facility] anticoagulation protocol    Miko West RN  Anticoagulation Clinic  7/5/2022    _______________________________________________________________________     Anticoagulation Episode Summary     Current INR goal:  2.0-3.0   TTR:  76.4 % (1 y)   Target end date:  Indefinite   Send INR reminders to:  EDISON SALVATORE    Indications    Permanent atrial fibrillation (H) [I48.21]  Chronic atrial fibrillation (H) [I48.20]  Long term (current) use of anticoagulants [Z79.01]           Comments:  Home care          Anticoagulation Care Providers     Provider Role Specialty Phone number    Patti Suárez MD Referring Internal Medicine [phone]

## 2022-07-06 ENCOUNTER — TELEPHONE (OUTPATIENT)
Dept: NURSING | Facility: CLINIC | Age: 82
End: 2022-07-06

## 2022-07-06 DIAGNOSIS — E11.9 DIABETES MELLITUS, TYPE 2 (H): ICD-10-CM

## 2022-07-06 DIAGNOSIS — E11.9 DIABETES MELLITUS (H): ICD-10-CM

## 2022-07-06 NOTE — TELEPHONE ENCOUNTER
Provider Recommendation Follow Up:   {  Patient is calling back.    Waiting on a call back from CLIFFORD Sky, and interested in if Dr Lauren has responded to her message.   She took blood pressure and it was 98/48 at 11:15am today.  She stated that her HA is better. She did take 2 Tylenol and that seemed to help the HA.  She is no longer feeling dizzy, weak, or light headed.     I advised her of what Dr Lauren had advised below. She stated that she should increase her water intake. Easy to get dehydrated in this hot weather. Eat something salty like pretzels. Patient stated that she has been drinking more fluids and elevating her feet since talking to the nurse earlier. That did seem to help things.    /59 with a pulse of 57 now during this call. She took a break to take her BP. No longer symptomatic as well and is feeling better.    She has an upcoming appointment with Dr Lauren 07/14/2022 and will follow up then, or call back if symptoms return or she has any further questions or concerns.      Dary Baugh RN  [facility] Nurse Advisor  7/5/2022 at 8:54 PM                  ambulate

## 2022-07-07 ENCOUNTER — TELEPHONE (OUTPATIENT)
Dept: INTERNAL MEDICINE | Facility: CLINIC | Age: 82
End: 2022-07-07

## 2022-07-07 ENCOUNTER — MEDICAL CORRESPONDENCE (OUTPATIENT)
Dept: HEALTH INFORMATION MANAGEMENT | Facility: CLINIC | Age: 82
End: 2022-07-07

## 2022-07-07 LAB — BACTERIA UR CULT: NORMAL

## 2022-07-07 NOTE — TELEPHONE ENCOUNTER
Caller: Patient    Patient is calling as she is about to run out of her lancets to check her blood sugar.    She only has help for 4 hours tomorrow starting at 8:30 tomorrow to run errands for the next few days.   She has only 7 left.     Unable to fill via protocol    Her pharamcy already sent over a refill request, so will route high prioirty.     Roya Orozco RN on 7/6/2022 at 9:28 PM

## 2022-07-07 NOTE — TELEPHONE ENCOUNTER
Prescription approved per [facility] Refill Protocol.    Next 5 appointments (look out 90 days)    Jul 14, 2022 10:30 AM  (Arrive by 10:10 AM)  Annual Wellness Visit with Patti Suárez MD  [facility] ([facility] ) [address] [place] [address]  [phone]   Aug 04, 2022 10:00 AM  (Arrive by 9:40 AM)  Provider Visit with Patti Suárez MD  [facility] ([facility] ) [address] [facility] [address]  [phone]

## 2022-07-07 NOTE — TELEPHONE ENCOUNTER
Patient calling with multiple concerns, difficult to understand full story. See prior triage encounter from 7/5/22.    Patient calling in with a little cramping and diarrhea-resolved today though. This is why she requested a UA to rule out C-diff. Dr. Granados (ID) called patient on Wednesday 7/6 and reviewed the UA results since patient was unable to make appointment in person. Patient said they told her she should not take antibiotics unless she has a fever. She has C-diff currently, but improving.     Now patient is concerned about her low diastolic BP-has been in the 90's/40's. Reports feeling dizzy at times for a long time now-not worse than baseline today. See previous triage encounter from 7/5/22. Patient's BP currently while on the phone is 114/50 now and her pulse is 51.     Care team has told her to drink more water she reports. Recommend she continues to drink lots of fluids and to call back to RN line if dizziness returns or persists. No CP or SOB currently-she will go to the ED if sxs present.    Routing to PCP team for review and call back tomorrow to review ongoing dizziness    Shannon Mena RN

## 2022-07-08 ENCOUNTER — TELEPHONE (OUTPATIENT)
Dept: INTERNAL MEDICINE | Facility: CLINIC | Age: 82
End: 2022-07-08

## 2022-07-08 NOTE — TELEPHONE ENCOUNTER
Fax received from [facility] - Warfarin 07/05/22 for review and signature.  Put in Dr. Lauren's in basket.

## 2022-07-08 NOTE — TELEPHONE ENCOUNTER
Patient does not take blood pressure medications.  Low blood pressure suggest dehydration  The patient should try some salty food, such as chips, pretzels and drink plenty of fluids

## 2022-07-08 NOTE — TELEPHONE ENCOUNTER
Call to patient. States she is still a little dizzy but not as bad as yesterday. Blood pressure today is 142//71. States she has increased her water intake and has been eating pretzels.

## 2022-07-10 ENCOUNTER — TELEPHONE (OUTPATIENT)
Dept: NURSING | Facility: CLINIC | Age: 82
End: 2022-07-10

## 2022-07-10 NOTE — TELEPHONE ENCOUNTER
Patient calling today to get results of her UA she had done on 7/5/22, no provider commented on the results and patient refused for us to page on call provider regarding recommendations as she states they wont give her antibiotics anyway's due to her history of C-diff since 2021 and having stomach pain. Patient states that she is eating pretzels as Dr. Lauren recommended and has been supplementing salt with a cup of ramen but is still having swollen ankles and legs although having her legs elevated and drinking water. Patient states that her feet are really sore today as she has neuropathy but doesn't take anything for it. Patient has been taking tylenol for her pain although she is on warfarin and was advised to contact her INR nurse regarding this as she has atrial fibrillation.  Denies chest pain/ SOB. Nurse offered patient to be triaged due to swelling in her legs- patient refused.

## 2022-07-11 ENCOUNTER — NURSE TRIAGE (OUTPATIENT)
Dept: NURSING | Facility: CLINIC | Age: 82
End: 2022-07-11

## 2022-07-11 ENCOUNTER — TELEPHONE (OUTPATIENT)
Dept: INTERNAL MEDICINE | Facility: CLINIC | Age: 82
End: 2022-07-11

## 2022-07-11 NOTE — TELEPHONE ENCOUNTER
Patient advised of Dr. Lauren's response below.  Per Dr. Granados, patient will not take antibiotics unless she develops fever.  BENITA Graham R.N.

## 2022-07-11 NOTE — TELEPHONE ENCOUNTER
The urine culture was ordered by infectious disease doctor, Dr. Granados's office will instruct the patient about the antibiotics.    If the blood pressure is above 110, she does not need to increase the salt intake.  She needs to keep her legs elevated    Patient has an appointment on July 14

## 2022-07-11 NOTE — TELEPHONE ENCOUNTER
has covid.  Last Thursday was with her.    Has not tested. No current symptoms. Occasional clear runny nose x 3 weeks.    Will test now as she needs to test 3-5 days after exposure.    Will test again in a few days.  Asking her daughter to get her another test.  If positive call back to get on antiviral.    Pateint verbalizes understanding.    Marce Linder RN  [facility] Nurse Advisor    Reason for Disposition    [1] CLOSE CONTACT COVID-19 EXPOSURE within last 14 days AND [2] NO symptoms    Protocols used: CORONAVIRUS (COVID-19) EXPOSURE-A-OH 1.18.2022

## 2022-07-12 NOTE — TELEPHONE ENCOUNTER
Reason for call:  Other   Patient called regarding (reason for call): appointment  Additional comments: Patient had to cancel upcoming appointment on 7/14 due to exposure to COVID. Patient was calling to reschedule wellness but earliest appointment is October. Please call to advise.     Phone number to reach patient:  Cell number on file:    Telephone Information:   Mobile [phone]       Best Time:  any    Can we leave a detailed message on this number?  YES    Chela Salcedo CMA/ Central Scheduling

## 2022-07-12 NOTE — TELEPHONE ENCOUNTER
Her 7/14/22 appointment is a telephone visit and she also has a in person appointment on 8/4/22. We will check with Dr Lauren to see what she wants to do.  We will cancel the 7/14/22 appointment all together. Patient agrees.

## 2022-07-12 NOTE — TELEPHONE ENCOUNTER
Patient was exposed to covid on Thursday July 7th.  Patient is requesting a PCR test.  Patient has no symptoms.  Scheduling set up a appointment with patient to get a PCR test.    Roya Marie RN   07/11/22 8:54 PM  [facility] Nurse Advisor    Reason for Disposition    [1] CLOSE CONTACT COVID-19 EXPOSURE within last 14 days AND [2] NO symptoms    Additional Information    Negative: COVID-19 lab test positive    Negative: [1] Lives with someone known to have influenza (flu test positive) AND [2] flu-like symptoms (e.g., cough, runny nose, sore throat, SOB; with or without fever)    Negative: [1] Symptoms of COVID-19 (e.g., cough, fever, SOB, or others) AND [2] doctor (or NP/PA) diagnosed COVID-19 based on symptoms    Negative: [1] Symptoms of COVID-19 (e.g., cough, fever, SOB, or others) AND [2] lives in an area with community spread    Negative: [1] Symptoms of COVID-19 (e.g., cough, fever, SOB, or others) AND [2] within 14 days of EXPOSURE (close contact) with diagnosed or suspected COVID-19 patient    Negative: [1] Symptoms of COVID-19 (e.g., cough, fever, SOB, or others) AND [2] within 14 days of travel from high-risk area for COVID-19 community spread (identified by CDC)    Negative: [1] Difficulty breathing (shortness of breath) occurs AND [2] onset > 14 days after COVID-19 EXPOSURE (Close Contact)    Negative: [1] Dry cough occurs AND [2] onset > 14 days after COVID-19 EXPOSURE    Negative: [1] Wet cough (i.e., white-yellow, yellow, green, or serena colored sputum) AND [2] onset > 14 days after COVID-19 EXPOSURE    Negative: [1] Common cold symptoms AND [2] onset > 14 days after COVID-19 EXPOSURE    Negative: COVID-19 vaccine reaction suspected (e.g., fever, headache, muscle aches) occurring during days 1 to 3 after getting vaccine    Negative: COVID-19 vaccine, questions about    Negative: [1] CLOSE CONTACT COVID-19 EXPOSURE within last 14 days AND [2] needs COVID-19 lab test to return to work AND  [3] NO symptoms    Negative: [1] CLOSE CONTACT COVID-19 EXPOSURE within last 14 days AND [2] exposed person is a  (e.g., police or paramedic) AND [3] NO symptoms    Negative: [1] CLOSE CONTACT COVID-19 EXPOSURE within last 14 days AND [2] exposed person is a healthcare worker who was NOT using all recommended personal protective equipment (e.g., a respirator-N95 mask, eye protection, gloves, and gown) AND [3] NO symptoms    Negative: [1] Living or working in a correctional facility, long-term care facility, or shelter (i.e., congregate setting; densely populated) AND [2] where an outbreak has occurred AND [3] NO symptoms    Negative: [1] CLOSE CONTACT COVID-19 EXPOSURE within last 14 days AND [2] weak immune system (e.g., HIV positive, cancer chemo, splenectomy, organ transplant, chronic steroids) AND [3] NO symptoms    Protocols used: CORONAVIRUS (COVID-19) EXPOSURE-A- 1.18.2022

## 2022-07-14 ENCOUNTER — ANTICOAGULATION THERAPY VISIT (OUTPATIENT)
Dept: ANTICOAGULATION | Facility: CLINIC | Age: 82
End: 2022-07-14

## 2022-07-14 ENCOUNTER — LAB (OUTPATIENT)
Dept: LAB | Facility: CLINIC | Age: 82
End: 2022-07-14
Payer: COMMERCIAL

## 2022-07-14 DIAGNOSIS — Z79.01 LONG TERM (CURRENT) USE OF ANTICOAGULANTS: ICD-10-CM

## 2022-07-14 DIAGNOSIS — Z20.822 CLOSE EXPOSURE TO 2019 NOVEL CORONAVIRUS: ICD-10-CM

## 2022-07-14 DIAGNOSIS — I48.21 PERMANENT ATRIAL FIBRILLATION (H): ICD-10-CM

## 2022-07-14 DIAGNOSIS — I48.20 CHRONIC ATRIAL FIBRILLATION (H): ICD-10-CM

## 2022-07-14 LAB — INR BLD: 2.3 (ref 0.9–1.1)

## 2022-07-14 PROCEDURE — U0005 INFEC AGEN DETEC AMPLI PROBE: HCPCS

## 2022-07-14 PROCEDURE — 85610 PROTHROMBIN TIME: CPT

## 2022-07-14 PROCEDURE — 36416 COLLJ CAPILLARY BLOOD SPEC: CPT

## 2022-07-14 PROCEDURE — U0003 INFECTIOUS AGENT DETECTION BY NUCLEIC ACID (DNA OR RNA); SEVERE ACUTE RESPIRATORY SYNDROME CORONAVIRUS 2 (SARS-COV-2) (CORONAVIRUS DISEASE [COVID-19]), AMPLIFIED PROBE TECHNIQUE, MAKING USE OF HIGH THROUGHPUT TECHNOLOGIES AS DESCRIBED BY CMS-2020-01-R: HCPCS

## 2022-07-14 NOTE — PROGRESS NOTES
ANTICOAGULATION MANAGEMENT     Savanna Rehman 81 year old female is on warfarin with therapeutic INR result. (Goal INR 2.0-3.0)    Recent labs: (last 7 days)     07/14/22  1356   INR 2.3*       ASSESSMENT       Source(s): Chart Review and Patient/Caregiver Call       Warfarin doses taken: Warfarin taken as instructed    Diet: No new diet changes identified    New illness, injury, or hospitalization: No    Medication/supplement changes: None noted    Signs or symptoms of bleeding or clotting: No    Previous INR: Therapeutic last 2(+) visits    Additional findings: Pt was in the clinic today for INR since she had to test for COVID due to an exposure last week. Pt denies any sxs. COVID test still in process. Pt reports that her Home Care nurse Anthony is on vacation but will be back on Tuesday 7/19/22. Also pt mentioned that she accidentally threw away her dentures and had to go to a specialist to get new ones.       PLAN       Dosing Instructions: continue your current warfarin dose with next INR in 5 days       Summary  As of 7/14/2022    Full warfarin instructions:  2.5 mg every Tue, Thu, Sat; 3.75 mg all other days   Next INR check:  7/19/2022             Telephone call with Savanna who agrees to plan and repeated back plan correctly    Patient to recheck with home meter    Education provided: Importance of notifying clinic for changes in medications; a sooner lab recheck maybe needed. and Contact [phone]  with any changes, questions or concerns.     Plan made per [facility] anticoagulation protocol    Tita Luna RN  Anticoagulation Clinic  7/14/2022    _______________________________________________________________________     Anticoagulation Episode Summary     Current INR goal:  2.0-3.0   TTR:  76.5 % (1 y)   Target end date:  Indefinite   Send INR reminders to:  NIKKI CHASE    Indications    Permanent atrial fibrillation (H) [I48.21]  Chronic atrial fibrillation (H) [I48.20]  Long term (current) use  of anticoagulants [Z79.01]           Comments:  Home care          Anticoagulation Care Providers     Provider Role Specialty Phone number    Patti Suárez MD Referring Internal Medicine [phone]

## 2022-07-15 ENCOUNTER — TELEPHONE (OUTPATIENT)
Dept: NURSING | Facility: CLINIC | Age: 82
End: 2022-07-15

## 2022-07-15 ENCOUNTER — NURSE TRIAGE (OUTPATIENT)
Dept: NURSING | Facility: CLINIC | Age: 82
End: 2022-07-15

## 2022-07-15 LAB — SARS-COV-2 RNA RESP QL NAA+PROBE: POSITIVE

## 2022-07-15 NOTE — TELEPHONE ENCOUNTER
"Triage call:   States her BP is 99/43  States she was exposed to someone on July 7th  Denies chest pain or shortness of breath    Coronavirus (COVID-19) Notification    Caller Name (Patient, parent, daughter/son, grandparent, etc)  Patient    Reason for call  Notify of Positive Coronavirus (COVID-19) lab results, assess symptoms,  review  [employer] recommendations    Lab Result    Lab test:  2019-nCoV rRt-PCR or SARS-CoV-2 PCR    Oropharyngeal AND/OR nasopharyngeal swabs is POSITIVE for 2019-nCoV RNA/SARS-COV-2 PCR (COVID-19 virus)    Brief introduction script  Introduce self then review script:  \"I am calling on behalf of [employer].  We were notified that your Coronavirus test (COVID-19) for was POSITIVE for the virus.\"    Gather patient reported symptoms   Assessment   Current Symptoms at time of phone call, reported by patient: (if no symptoms, document No symptoms] Patient states she doesn't have any new symptoms    Date of Symptom(s) onset (if applicable) No symptoms- exposed to COVID 7/7/22     If at time of call, Patients symptoms hare worsened, the Patient should contact 911 or have someone drive them to Emergency Dept promptly:      If Patient calling 911, inform 911 personal that you have tested positive for the Coronavirus (COVID-19).  Place mask on and await 911 to arrive.    If Emergency Dept, If possible, please have another adult drive you to the Emergency Dept but you need to wear mask when in contact with other people.      Monoclonal Antibody Administration    You may be eligible to receive a new treatment with a monoclonal antibody for preventing hospitalization in patients at high risk for complications from COVID-19. This medication is still experimental and available on a limited basis; it is given through an IV and must be given at an infusion center. Please note that not all people who are eligible will receive the medication since it is in limited supply.  Is the patient " symptomatic and onset of symptoms within the last 7 days? No. Patient does not qualify.    Review information with Patient    Your result was positive. This means you have COVID-19 (coronavirus).      How can I protect others?    These guidelines are for isolating before returning to work, school or .       If you DO have symptoms:  o Stay home and away from others  - For at least 5 days after your symptoms started, AND   - You are fever free for 24 hours (with no medicine that reduces fever), AND  - Your other symptoms are better.  o Wear a mask for 10 full days any time you are around others.    If you DON'T have symptoms:  o Stay at home and away from others for at least 5 days after your positive test.  o Wear a mask for 10 full days any time you are around others.    There may be different guidelines for healthcare facilities. Please check with the specific sites before arriving.     If you've been told by a doctor that you were severely ill with COVID-19 or are immunocompromised, you should isolate for at least 10 days.    You should not go back to work until you meet the guidelines above for ending your home isolation. You don't need to be retested for COVID-19 before going back to work--studies show that you won't spread the virus if it's been at least 10 days since your symptoms started (or 20 days, if you have a weak immune system).    Employers, schools, and daycares: This is an official notice for this person's medical guidelines for returning in-person. They must meet the above guidelines before going back to work, school, or  in person.    You will receive a positive COVID-19 letter via [employer] or the mail soon with additional self-care information.      Would you like me to review some of that information with you now?  No        Harriet Hirsch, RN

## 2022-07-15 NOTE — PROGRESS NOTES
Patient tested positive for COVID. Recent INR in goal range. She is planning on next home care visit to be 7/26. She does not want home care to see her due to exposure concerns. Instructed to notify home care and monitor symptoms including breathing and to update provider with any changes. Currently experiencing headache and runny nose.  Calendar updated to reflect INR recheck date of 7/26

## 2022-07-15 NOTE — TELEPHONE ENCOUNTER
Coronavirus (COVID-19) Notification    Caller Name (Patient, parent, daughter/son, grandparent, etc)  Savanna    Reason for call  Notify of Positive Coronavirus (COVID-19) lab results, assess symptoms,  review [facility] recommendations    Lab Result    Lab test:  2019-nCoV rRt-PCR or SARS-CoV-2 PCR    Oropharyngeal AND/OR nasopharyngeal swabs is POSITIVE for 2019-nCoV RNA/SARS-COV-2 PCR (COVID-19 virus)      Gather patient reported symptoms   Assessment   Current Symptoms at time of phone call, reported by patient: (if no symptoms, document: No symptoms] Yes   Date of symptom(s) onset (if applicable) 7/13/22     If at time of call, Patients symptoms have worsened, the Patient should contact 911 or have someone drive them to Emergency Dept promptly:      If Patient calling 911, inform 911 personal that you have tested positive for the Coronavirus (COVID-19).  Place mask on and await 911 to arrive.    If Emergency Dept, If possible, please have another adult drive you to the Emergency Dept but you need to wear mask when in contact with other people.      Treatment Options:   Patient classified as COVID treatment eligible by Epic high risk algorithm: Yes  Is the patient symptomatic at the time of result notification? Yes. Was the onset of symptoms within the last 5 days? Yes.   There are now oral medications available for the treatment of COVID-19.  Taking one of these medications within the first five days of symptoms (when people may not yet feel severely ill) has been shown to make people feel better, prevent them from getting sicker, and preventing hospitalization and death.   Does the patient agree to have a visit with a provider to discuss treatment options? Yes. Is the patient seen at [facility]?  No: Warm transfer caller to [phone] to be scheduled with a virtual urgent provider.  During transfer, instruct  on appropriate time frame for visit     Review information with  Patient    Your result was positive. This means you have COVID-19 (coronavirus).    How can I protect others?    These guidelines are for isolating before returning to work, school or .    If you DO have symptoms    Stay home and away from others     For at least 5 days after your symptoms started, AND    You are fever free for 24 hours (with no medicine that reduces fever), AND    Your other symptoms are better    Wear a mask for 10 full days anytime you are around others    If you DON'T have symptoms    Stay home and away from others for at least 5 days after your positive test    Wear a mask for 10 full days anytime you are around others    There may be different guidelines for healthcare facilities.  Please check with the specific sites before arriving.    If you have been told by a doctor that you were severely ill with COVID-19 or are immunocompromised, you should isolate for at least 10 days.    You should not go back to work until you meet the guidelines above for ending your home isolation. You don't need to be retested for COVID-19 before going back to work--studies show that you won't spread the virus if it's been at least 10 days since your symptoms started (or 20 days, if you have a weak immune system).    Employers, schools, and daycares: This is an official notice for this person's medical guidelines for returning in-person.  They must meet the above guidelines before going back to work, school or  in person.    You will receive a positive COVID-19 letter via [employer] or the mail soon with additional self-care information.    Would you like me to review some of that information with you now?  No    If you were tested for an upcoming procedure, please contact your provider for next steps.    Nafisa Cage

## 2022-07-16 NOTE — TELEPHONE ENCOUNTER
"Patient calling back for information, she's trying to figure out where she got COVID-19.  \"I'm not trying to blame someone, I just want to know where I got this.\"     Pt states she has a girl who drives, her around on Thursdays to run errands.  The last time she was with this girl was on Thursday July 7th (8 days ago).  This girl tested positive for COVID on July 11th (4 days ago).  Pt tested positive for COVID on July 14th (yesterday).  Pt states she's asymptomatic.  She then states she's had a runny nose for the past 1-2 weeks, water \"pours out of her nose for 3-4 minutes\" in the morning.  She's also sneezing. She believes these symptoms are due to allergies.      Pt also was at a restaurant on 07/09 but states she was sitting far away from others.      Education provided as requested. Patient verbalized understanding and had no further questions.      Griselda Miller RN  [facility] Nurse Advisor        Reason for Disposition    Health Information question, no triage required and triager able to answer question    Additional Information    Negative: RN needs further essential information from caller in order to complete triage    Negative: Requesting regular office appointment    Negative: [1] Caller requesting NON-URGENT health information AND [2] PCP's office is the best resource    Protocols used: INFORMATION ONLY CALL - NO TRIAGE-A-      "

## 2022-07-17 ENCOUNTER — TELEPHONE (OUTPATIENT)
Dept: NURSING | Facility: CLINIC | Age: 82
End: 2022-07-17

## 2022-07-17 ENCOUNTER — NURSE TRIAGE (OUTPATIENT)
Dept: NURSING | Facility: CLINIC | Age: 82
End: 2022-07-17

## 2022-07-17 NOTE — TELEPHONE ENCOUNTER
DX with covid on 7/14.  She has no symptoms.  Has been vaccinated x 4 with Pfizer. Living at [facility] there rules state she has to stay in isolation for 10 days.  Patient is wondering why.  Patient is aware of the CDC and [facility] regulations which are less strict.  Writer advised Savanna to speak with staff on Monday.    Lucy Sanchez RN, [facility] Triage Nurse Advisor      Reason for Disposition    COVID-19 Home Isolation, questions about    Protocols used: CORONAVIRUS (COVID-19) DIAGNOSED OR [id number] 1.18.2022

## 2022-07-18 ENCOUNTER — TELEPHONE (OUTPATIENT)
Dept: ANTICOAGULATION | Facility: CLINIC | Age: 82
End: 2022-07-18

## 2022-07-18 NOTE — TELEPHONE ENCOUNTER
Home care nurse Fannie left a message on the home care voice mail she would like a call back to discuss patient's current status of being Covid positive.    Returned call to home care nurse and advised [facility] is aware patient has tested positive for Covid.  Advisee home care to continue with current dosing of 2.5 mg Tue/thurs/Sat and 3.75 mg all the other days of the week and recheck on 7/26/22.    Fannie stated understanding and was agreeable with plan.    JAMI JohnsonN, RN  Anticoagulation Clinic

## 2022-07-19 ENCOUNTER — TELEPHONE (OUTPATIENT)
Dept: INTERNAL MEDICINE | Facility: CLINIC | Age: 82
End: 2022-07-19

## 2022-07-19 ENCOUNTER — TELEPHONE (OUTPATIENT)
Dept: NURSING | Facility: CLINIC | Age: 82
End: 2022-07-19

## 2022-07-19 NOTE — TELEPHONE ENCOUNTER
Telephone call    Patient calling to see if it is possible for the positive result to be a mistake.  She tested with a home test today and it was negative. She is upset because the senior living  Is making her stay in her room for the full ten days and there is no one to get her mail for her and she neded her medications.She has no symptoms.    Rebeca Torres RN   [facility] Nurse Advisor  1:41 PM 7/19/2022    COVID 19 Nurse Triage Plan/Patient Instructions    Please be aware that novel coronavirus (COVID-19) may be circulating in the community. If you develop symptoms such as fever, cough, or SOB or if you have concerns about the presence of another infection including coronavirus (COVID-19), please contact your health care provider or visit [email or web address].     Disposition/Instructions    Home care recommended. Follow home care protocol based instructions.    Thank you for taking steps to prevent the spread of this virus.  o Limit your contact with others.  o Wear a simple mask to cover your cough.  o Wash your hands well and often.    Resources    [facility]: About COVID-19: [email or web address]    CDC: What to Do If You're Sick: www.cdc.gov/coronavirus/2019-ncov/about/steps-when-sick.html    CDC: Ending Home Isolation: www.cdc.gov/coronavirus/2019-ncov/hcp/disposition-in-home-patients.html     CDC: Caring for Someone: www.cdc.gov/coronavirus/2019-ncov/if-you-are-sick/care-for-someone.html     [place]: Interim Guidance for Hospital Discharge to Home: www.health.[facility].[place]/diseases/coronavirus/hcp/hospdischarge.pdf    [facility] clinical trials (COVID-19 research studies): clinicalaffairs.[email or web address].edu/umn-clinical-trials     Below are the COVID-19 hotlines at the [place] Department of [employer] ([place]). Interpreters are available.   o For health questions: Call [phone] or 1-805.773.7330 (7 a.m. to 7 p.m.)  o For questions about schools and childcare: Call  [phone] or [phone] (7 a.m. to 7 p.m.)

## 2022-07-21 ENCOUNTER — MEDICAL CORRESPONDENCE (OUTPATIENT)
Dept: HEALTH INFORMATION MANAGEMENT | Facility: CLINIC | Age: 82
End: 2022-07-21

## 2022-07-26 ENCOUNTER — ANTICOAGULATION THERAPY VISIT (OUTPATIENT)
Dept: ANTICOAGULATION | Facility: CLINIC | Age: 82
End: 2022-07-26

## 2022-07-26 DIAGNOSIS — I48.20 CHRONIC ATRIAL FIBRILLATION (H): ICD-10-CM

## 2022-07-26 DIAGNOSIS — I48.21 PERMANENT ATRIAL FIBRILLATION (H): Primary | ICD-10-CM

## 2022-07-26 DIAGNOSIS — Z79.01 LONG TERM (CURRENT) USE OF ANTICOAGULANTS: ICD-10-CM

## 2022-07-26 LAB — INR (EXTERNAL): 2 (ref 0.9–1.1)

## 2022-07-26 NOTE — PROGRESS NOTES
ANTICOAGULATION MANAGEMENT     Savanna Rehman 81 year old female is on warfarin with therapeutic INR result. (Goal INR 2.0-3.0)    Recent labs: (last 7 days)     07/26/22  1254   INR 2.0*       ASSESSMENT       Source(s): Chart Review and Home Care/Facility Nurse       Warfarin doses taken: Warfarin taken as instructed    Diet: Decreased appetite since getting COVID.    New illness, injury, or hospitalization: Yes: COVID + 07/14. Pt had very mild symptoms.     Medication/supplement changes: None noted    Signs or symptoms of bleeding or clotting: No    Previous INR: Therapeutic last 2(+) visits    Additional findings: None       PLAN     Recommended plan for no diet, medication or health factor changes affecting INR     Dosing Instructions: Continue your current warfarin dose with next INR in 2 weeks       Summary  As of 7/26/2022    Full warfarin instructions:  2.5 mg every Tue, Thu, Sat; 3.75 mg all other days   Next INR check:  8/9/2022             Telephone call with  nurse Evelin who verbalizes understanding and agrees to plan    Orders given to  Homecare nurse/facility to recheck    Education provided: Goal range and significance of current result    Plan made per [facility] anticoagulation protocol    Miko West RN  Anticoagulation Clinic  7/26/2022    _______________________________________________________________________     Anticoagulation Episode Summary     Current INR goal:  2.0-3.0   TTR:  76.4 % (1 y)   Target end date:  Indefinite   Send INR reminders to:  NIKKI CHASE    Indications    Permanent atrial fibrillation (H) [I48.21]  Chronic atrial fibrillation (H) [I48.20]  Long term (current) use of anticoagulants [Z79.01]           Comments:  Home care          Anticoagulation Care Providers     Provider Role Specialty Phone number    Patti Suárez MD Referring Internal Medicine [phone]

## 2022-07-27 ENCOUNTER — TELEPHONE (OUTPATIENT)
Dept: INTERNAL MEDICINE | Facility: CLINIC | Age: 82
End: 2022-07-27

## 2022-07-28 DIAGNOSIS — R19.7 DIARRHEA: Primary | ICD-10-CM

## 2022-08-01 ENCOUNTER — TELEPHONE (OUTPATIENT)
Dept: INTERNAL MEDICINE | Facility: CLINIC | Age: 82
End: 2022-08-01

## 2022-08-01 NOTE — TELEPHONE ENCOUNTER
"Pt calling stating she had COVID and has been having \"brain fog\" since and wondering how long this will last. According to the CDC and UpTo[employer] the brain fog can last anywhere from  6 weeks to 9 months or more. Encouraged patient to eat well and stay hydrated.    Pt expressed understanding and acceptance of the plan.  Pt had no further questions at this time.  Advised can call back to clinic at any time with concerns.     Aj LOREDO RN, BSN    "

## 2022-08-02 ENCOUNTER — TELEPHONE (OUTPATIENT)
Dept: INTERNAL MEDICINE | Facility: CLINIC | Age: 82
End: 2022-08-02

## 2022-08-02 ENCOUNTER — TELEPHONE (OUTPATIENT)
Dept: NURSING | Facility: CLINIC | Age: 82
End: 2022-08-02

## 2022-08-02 NOTE — TELEPHONE ENCOUNTER
"Patient calling to state that she just found out that 8 people in her building are COVID positive.     Patient denies having any symptoms other than \"COVID fog.\"     Patient has been vaccinated to COVID. Patient wants to know if she should still come in for her appointment.     This RN spoke with Dr. Lauren who advised appointment be changed from in clinic to telephone. Patient in agreement.       Appointments in Next Year    Aug 04, 2022 10:00 AM  (Arrive by 9:40 AM)  Provider Visit with Patti Suárez MD  [facility] ([facility] ) [phone]       Roxy LOREDO RN   [facility]    "

## 2022-08-02 NOTE — TELEPHONE ENCOUNTER
"Patient is calling and states that she is having \"brain fog\".  Patient is wondering if there is treatment for this.  FNA advised to contact Patti Soto and patient states that she will contact MD when ready.  Patient declines triage.    "

## 2022-08-03 DIAGNOSIS — Z53.9 DIAGNOSIS NOT YET DEFINED: Primary | ICD-10-CM

## 2022-08-03 PROCEDURE — G0179 MD RECERTIFICATION HHA PT: HCPCS | Performed by: INTERNAL MEDICINE

## 2022-08-04 ENCOUNTER — VIRTUAL VISIT (OUTPATIENT)
Dept: INTERNAL MEDICINE | Facility: CLINIC | Age: 82
End: 2022-08-04
Payer: COMMERCIAL

## 2022-08-04 ENCOUNTER — TELEPHONE (OUTPATIENT)
Dept: INTERNAL MEDICINE | Facility: CLINIC | Age: 82
End: 2022-08-04

## 2022-08-04 VITALS
HEIGHT: 68 IN | BODY MASS INDEX: 38.47 KG/M2 | DIASTOLIC BLOOD PRESSURE: 70 MMHG | SYSTOLIC BLOOD PRESSURE: 136 MMHG | HEART RATE: 69 BPM | RESPIRATION RATE: 16 BRPM

## 2022-08-04 DIAGNOSIS — A04.72 C. DIFFICILE COLITIS: ICD-10-CM

## 2022-08-04 DIAGNOSIS — Z91.018 FOOD ALLERGY: Primary | ICD-10-CM

## 2022-08-04 DIAGNOSIS — Z91.018 ALLERGY, FOOD: ICD-10-CM

## 2022-08-04 DIAGNOSIS — E11.65 TYPE 2 DIABETES MELLITUS WITH HYPERGLYCEMIA, WITHOUT LONG-TERM CURRENT USE OF INSULIN (H): Primary | ICD-10-CM

## 2022-08-04 PROBLEM — M89.8X8 BONY PELVIC PAIN: Status: ACTIVE | Noted: 2022-07-06

## 2022-08-04 PROCEDURE — 99443 PR PHYSICIAN TELEPHONE EVALUATION 21-30 MIN: CPT | Mod: 95 | Performed by: INTERNAL MEDICINE

## 2022-08-04 NOTE — PROGRESS NOTES
"Savanna is a 81 year old who is being evaluated via a billable telephone visit.      What phone number would you like to be contacted at? [phone]  How would you like to obtain your AVS? Mail a copy      This is a telephone encounter with the patient.       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 difficile colitis  Comment: persistent   Plan: f/u w ID, dr Granados        Chief complaint:                                                      Follow up chronic medical problems      SUBJECTIVE:                                                    History of present illness:      Recent Covid  -- she feels brain fog after Covid infection. No specific treatment     Allergy testing     -- she had allergy tests 7 y ago for milk and fish -- she doesn't know the results   -- she has been drinking milk since then   -- the allergist doctor retired and she would like a referral to the same office.  -- she has an Epipen at all time    -- tel [phone], [facility]          C Diff colitis -- prolonged -- it goes like chronic   -- she has been working with ID office dr Granados    -- she received a letter from insurance that apparently writes her \" Congratulation. We will not cover these meds..\"    Diabetes Follow-up      How often are you checking your blood sugar? Not at all    What concerns do you have today about your diabetes? None     Do you have any of these symptoms? (Select all that " apply)  Numbness in feet, Burning in feet and Redness, sores, or blisters on feet      BP Readings from Last 2 Encounters:   06/08/22 (!) 146/77   06/02/22 135/75     Hemoglobin A1C (%)   Date Value   06/02/2022 6.5 (H)   10/07/2021 6.4 (H)   02/22/2021 6.3 (H)   09/14/2020 6.4 (H)     LDL Cholesterol Calculated (mg/dL)   Date Value   06/02/2022 100   02/22/2021 107 (H)   03/04/2020 111 (H)                 How many servings of fruits and vegetables do you eat daily?  0-1    On average, how many sweetened beverages do you drink each day (Examples: soda, juice, sweet tea, etc.  Do NOT count diet or artificially sweetened beverages)?   0    How many days per week do you exercise enough to make your heart beat faster? 3 or less    How many minutes a day do you exercise enough to make your heart beat faster? 9 or less    How many days per week do you miss taking your medication? 0      Review of Systems:                                                      ROS: negative for fever, chills, cough, wheezes, chest pain, shortness of breath, vomiting, abdominal pain, leg swelling Pos for diarrhea         OBJECTIVE:           An actual physical exam can't be done during phone visit         PMHx: reviewed  Past Medical History:   Diagnosis Date     Anemia     Iron Deficiency anemia     Atrial fibrillation (H)      CAD (coronary artery disease)     non-obstructive     Chronic pain     neck, low back, legs     Congestive heart failure (H)      Degenerative disk disease      Fibromyalgia      Gastro-oesophageal reflux disease      Gout      Hiatal hernia      Mumps      Neuropathy      CRISTIANA (obstructive sleep apnea) - CPAP      Palpitations      Pernicious anemia      Sleep apnea     uses CPAP.     Urinary incontinence      Vitamin D deficiency       PSHx: reviewed  Past Surgical History:   Procedure Laterality Date     APPENDECTOMY       CHOLECYSTECTOMY       COLONOSCOPY  3/15/2011     CORONARY ANGIOGRAPHY ADULT ORDER        CYSTOSCOPY, BIOPSY BLADDER, COMBINED N/A 7/13/2020    Procedure: CYSTOSCOPY, WITH BLADDER BIOPSY;  Surgeon: Deisy Wilson MD;  Location: [facility] OR     HEART CATH LEFT HEART CATH  12/30/16    medication management     HYSTERECTOMY TOTAL ABDOMINAL       Knee replacement NOS Left      LAPAROSCOPIC NISSEN FUNDOPLICATION N/A 2/4/2015    Procedure: LAPAROSCOPIC NISSEN FUNDOPLICATION;  Surgeon: Armando Ansari MD;  Location: [facility] OR     TONSILLECTOMY       TRANSPOSITION ULNAR NERVE (ELBOW)          Meds: reviewed  Current Outpatient Medications   Medication Sig Dispense Refill     ACE/ARB/ARNI NOT PRESCRIBED (INTENTIONAL) Please choose reason not prescribed, below       alcohol swab prep pads Use to swab area of injection/chandrakant as directed. 100 each 3     B Complex-C (SUPER B COMPLEX PO) Take 1 tablet by mouth At Bedtime       balsalazide (COLAZAL) 750 MG capsule Take 2,250 mg by mouth 3 times daily       Biotin 5000 MCG TABS Take 5,000 mcg by mouth At Bedtime       blood glucose (NO BRAND SPECIFIED) lancets standard Use to test blood sugar  as directed. 1 each 0     blood glucose (NO BRAND SPECIFIED) lancets standard Use to test blood sugar 1 time daily 100 lancet 3     blood glucose (NO BRAND SPECIFIED) lancing device Device to be used with lancets. Patient requests Thumb Microlet 2 lancing device to check blood glucose once per day. 1 each 0     blood glucose calibration (NO BRAND SPECIFIED) solution Use to calibrate blood glucose monitor 1 Bottle 3     blood glucose monitoring (NO BRAND SPECIFIED) meter device kit Use to test blood sugar 1 times daily or as directed. 1 kit 1     blood glucose monitoring (NO BRAND SPECIFIED) meter device kit Use to test blood sugar 1 time daily 1 kit 0     butalbital-aspirin-caffeine (FIORINAL) -40 MG capsule TAKE 1 CAPSULE BY MOUTH EVERY 6 HOURS AS NEEDED FOR HEADACHE 30 capsule 0     cefdinir (OMNICEF) 300 MG capsule Take 1 capsule (300 mg) by mouth 2 times daily 10 capsule 0      cephALEXin (KEFLEX) 500 MG capsule Take 1 capsule (500 mg) by mouth 2 times daily 14 capsule 0     cholecalciferol (VITAMIN D3) 5000 units (125 mcg) capsule Take 5,000 Units by mouth At Bedtime       EPINEPHrine (EPIPEN 2-ROBBY) 0.3 MG/0.3ML injection 2-pack Inject 0.3 mLs (0.3 mg) into the muscle once as needed for anaphylaxis 1 each 1     estradiol (ESTRACE) 0.1 MG/GM vaginal cream Please use your finger to place a large blueberry size amount in the vagina nightly for two weeks and then three times a week at night thereafter 42.5 g 3     fidaxomicin (DIFICID) 200 MG tablet Take 1 tablet (200 mg) by mouth 2 times daily 20 tablet 0     glipiZIDE (GLUCOTROL XL) 2.5 MG 24 hr tablet TAKE 1 TABLET TWICE A  tablet 0     meclizine (ANTIVERT) 12.5 MG tablet TAKE 1 TABLET BY MOUTH THREE TIMES DAILY AS NEEDED FOR DIZZINESS 30 tablet 1     mirabegron (MYRBETRIQ) 25 MG 24 hr tablet Take 1 tablet (25 mg) by mouth daily 30 tablet 3     nystatin (MYCOSTATIN) 997081 UNIT/GM external powder Apply topically 2 times daily 60 g 1     nystatin (MYCOSTATIN) 601883 UNIT/GM external powder Apply to bilateral groin area 2x daily as needed. 30 g 1     ondansetron (ZOFRAN) 8 MG tablet Take 1 tablet (8 mg) by mouth every 8 hours as needed for nausea 30 tablet 0     ONETOUCH ULTRA test strip USE 1 STRIP TO CHECK GLUCOSE ONCE DAILY 100 strip 11     order for DME Oxygen 2 Li/min  at night bleed with CPAP       vancomycin (VANCOCIN) 125 MG capsule Take 1 capsule (125 mg) by mouth 2 times daily 20 capsule 0     vancomycin (VANCOCIN) 125 MG capsule Take 1 capsule (125 mg) by mouth 2 times daily 40 capsule 1     Vibegron 75 MG TABS Take 75 mg by mouth daily 30 tablet 3     warfarin ANTICOAGULANT (COUMADIN) 2.5 MG tablet TAKE ONE AND ONE-HALF TABLETS ( 3.75 MG ) EVERY Monday, Wednesday, Friday  AND TAKE 1 TABLET ALL OTHER DAYS OF THE WEEK OR AS DIRECTED BY [facility] clinic 126 tablet 1       Soc Hx: reviewed  Fam Hx: reviewed          Patti  MD Leonidas  Internal Medicine

## 2022-08-04 NOTE — TELEPHONE ENCOUNTER
Please send the request to the referral dept     Her prior allergist doctor retired but she would like to go to the same office since they have the records.

## 2022-08-05 NOTE — TELEPHONE ENCOUNTER
[facility] network and yes Pt may be seen at this clinic.    NOLBERTO Rueda [facility] Referral Rep    :

## 2022-08-08 NOTE — TELEPHONE ENCOUNTER
Patient states she now needs a referral to [facility] instead of [facility] as the coverage is much better if she goes there. Will route to referrals to determine if we can refer.  BENITA Graham R.N.      They are located at :  [facility] [address] [place] [address]

## 2022-08-09 ENCOUNTER — ANTICOAGULATION THERAPY VISIT (OUTPATIENT)
Dept: ANTICOAGULATION | Facility: CLINIC | Age: 82
End: 2022-08-09

## 2022-08-09 DIAGNOSIS — I48.20 CHRONIC ATRIAL FIBRILLATION (H): ICD-10-CM

## 2022-08-09 DIAGNOSIS — I48.21 PERMANENT ATRIAL FIBRILLATION (H): Primary | ICD-10-CM

## 2022-08-09 DIAGNOSIS — Z79.01 LONG TERM (CURRENT) USE OF ANTICOAGULANTS: ICD-10-CM

## 2022-08-09 LAB — INR (EXTERNAL): 3.6 (ref 0.9–1.1)

## 2022-08-09 NOTE — TELEPHONE ENCOUNTER
Spoke with Pt and stated the 8/4/22 Adult Allergy/Asthma Order was faxed to [facility] in [place].    NOLBERTO Rueda [facility] Referral Rep

## 2022-08-09 NOTE — PROGRESS NOTES
ANTICOAGULATION MANAGEMENT     Savanna Rehman 81 year old female is on warfarin with supratherapeutic INR result. (Goal INR 2.0-3.0)    Recent labs: (last 7 days)     08/09/22  1418   INR 3.6*       ASSESSMENT       Source(s): Chart Review and Home Care/Facility Nurse       Warfarin doses taken: Warfarin taken as instructed    Diet: More ETOH than normal in the last week or two. Pt didn't provide much more information to home care RN.    New illness, injury, or hospitalization: Yes: A couple episodes of nausea/emesis on 07/31. Diarrhea 1-2 times in the last week. Diarrhea can increase INR.     Medication/supplement changes: None noted    Signs or symptoms of bleeding or clotting: No    Previous INR: Therapeutic last 2(+) visits    Additional findings: None       PLAN     Recommended plan for temporary change(s) affecting INR     Dosing Instructions: hold dose then continue your current warfarin dose with next INR in 1 week       Summary  As of 8/9/2022    Full warfarin instructions:  8/9: Hold; Otherwise 2.5 mg every Tue, Thu, Sat; 3.75 mg all other days   Next INR check:  8/16/2022             Telephone call with Evelin home care/facility nurse who verbalizes understanding and agrees to plan    Orders given to  Homecare nurse/facility to recheck    Education provided: Potential interaction between warfarin and alcohol, Goal range and significance of current result and Potential interaction between warfarin and diarrhea    Plan made per [facility] anticoagulation protocol    Miko West RN  Anticoagulation Clinic  8/9/2022    _______________________________________________________________________     Anticoagulation Episode Summary     Current INR goal:  2.0-3.0   TTR:  75.0 % (1 y)   Target end date:  Indefinite   Send INR reminders to:  NIKKI CHASE    Indications    Permanent atrial fibrillation (H) [I48.21]  Chronic atrial fibrillation (H) [I48.20]  Long term (current) use of anticoagulants [Z79.01]            Comments:  Home care          Anticoagulation Care Providers     Provider Role Specialty Phone number    Patti Suárez MD Referring Internal Medicine [phone]

## 2022-08-16 ENCOUNTER — NURSE TRIAGE (OUTPATIENT)
Dept: PEDIATRICS | Facility: CLINIC | Age: 82
End: 2022-08-16

## 2022-08-16 NOTE — TELEPHONE ENCOUNTER
"Patient asking about a medication to treat brain fog. Patient states she spoke with Dr. Lauren about the brain fog, and was told there was no medication and nothing she could to about it. Patient reports it started 08/01/2022 after her Covid infection. She is wondering if I have any thoughts or ideas for her.       Patient reports she does not take in enough water (approximately 40-50 ounces daily), and is drinking too much diet coke.     Recommended patient decrease her diet coke consumption and monitor/ increase water intake. Advised that some of her symptoms could be related to her hydration status besides post covid brain fog.       Reason for Disposition    Long COVID (e.g., post-COVID syndrome), questions about    Answer Assessment - Initial Assessment Questions  1. LEVEL OF CONSCIOUSNESS: \"How is he (she, the patient) acting right now?\" (e.g., alert-oriented, confused, lethargic, stuporous, comatose)      Alert, but patient reports since covid infection she has had in increase in not being able to find her words, recall past events, she is concerned  2. ONSET: \"When did the confusion start?\"  (minutes, hours, days)      08/01/2022 after covid  3. PATTERN \"Does this come and go, or has it been constant since it started?\"  \"Is it present now?\"      daily  4. ALCOHOL or DRUGS: \"Has he been drinking alcohol or taking any drugs?\"       No  5. NARCOTIC MEDICATIONS: \"Has he been receiving any narcotic medications?\" (e.g., morphine, Vicodin)      No  6. CAUSE: \"What do you think is causing the confusion?\"       Post Covid infection  7. OTHER SYMPTOMS: \"Are there any other symptoms?\" (e.g., difficulty breathing, headache, fever, weakness)      Headache, tension headache as her shoulders are tight and affecting her neck muscles.    Answer Assessment - Initial Assessment Questions  1. COVID-19 ONSET: \"When did the symptoms of COVID-19 first start?\"      Mid July  2. DIAGNOSIS CONFIRMATION: \"How were you diagnosed?\" " "(e.g., COVID-19 oral or nasal viral test; COVID-19 antibody test; doctor visit)      NA  3. MAIN SYMPTOM:  \"What is your main concern or symptom right now?\" (e.g., breathing difficulty, cough, fatigue. loss of smell)      Brain fog  4. SYMPTOM ONSET: \"When did the  Brain fog  start?\"      08/01/2022  5. BETTER-SAME-WORSE: \"Are you getting better, staying the same, or getting worse over the last 1 to 2 weeks?\"      The same  6. RECENT MEDICAL VISIT: \"Have you been seen by a healthcare provider (doctor, NP, PA) for these persisting COVID-19 symptoms?\" If Yes, ask: \"When were you seen?\" (e.g., date)      Yes, 08/xx/2022  7. COUGH: \"Do you have a cough?\" If Yes, ask: \"How bad is the cough?\"        NA  8. FEVER: \"Do you have a fever?\" If Yes, ask: \"What is your temperature, how was it measured, and when did it start?\"      NA  9. BREATHING DIFFICULTY: \"Are you having any trouble breathing?\" If Yes, ask: \"How bad is your breathing?\" (e.g., mild, moderate, severe)     - MILD: No SOB at rest, mild SOB with walking, speaks normally in sentences, can lie down, no retractions, pulse < 100.     - MODERATE: SOB at rest, SOB with minimal exertion and prefers to sit, cannot lie down flat, speaks in phrases, mild retractions, audible wheezing, pulse 100-120.     - SEVERE: Very SOB at rest, speaks in single words, struggling to breathe, sitting hunched forward, retractions, pulse > 120        Not able to assess, pt not on phone  10. HIGH RISK DISEASE: \"Do you have any chronic medical problems?\" (e.g., asthma, heart or lung disease, weak immune system, obesity, etc.)        Diabetic, CKD  11. VACCINE: \"Have you gotten the COVID-19 vaccine?\" If Yes, ask: \"Which one, how many shots, when did you get it?\"        Not able to answer  12. BOOSTER: \"Have you received your COVID-19 booster?\" If Yes, ask: \"Which one and when did you get it?\"        NA  13. PREGNANCY: \"Is there any chance you are pregnant?\" \"When was your last menstrual " "period?\"        NA  14. OTHER SYMPTOMS: \"Do you have any other symptoms?\"  (e.g., fatigue, headache, muscle pain, weakness)        Headache  15. O2 SATURATION MONITOR:  \"Do you use an oxygen saturation monitor (pulse oximeter) at home?\" If Yes, ask \"What is your reading (oxygen level) today?\" \"What is your usual oxygen saturation reading?\" (e.g., 95%)        NA    Protocols used: CORONAVIRUS (COVID-19) PERSISTING SYMPTOMS FOLLOW-UP CALL-[facility] 1.18.2022, CONFUSION - [facility]    Marianne HERNANDEZ RN on 8/16/2022 at 4:00 PM    "

## 2022-08-16 NOTE — PROGRESS NOTES
HC RN Evelin called stating she is unable to see the patient today, 08/16. She is going to see if another nurse from her agency could see the patient today or tomorrow. If not, Evelin will see Savanna on Thursday, 08/18. In the event pt is not seen until Thursday, Evelin will advise pt continue on maintenance dose of warfarin for 08/16 and 08/17 (2.5mg, 3.75mg respectively).

## 2022-08-18 ENCOUNTER — ANTICOAGULATION THERAPY VISIT (OUTPATIENT)
Dept: ANTICOAGULATION | Facility: CLINIC | Age: 82
End: 2022-08-18

## 2022-08-18 DIAGNOSIS — I48.20 CHRONIC ATRIAL FIBRILLATION (H): ICD-10-CM

## 2022-08-18 DIAGNOSIS — I48.21 PERMANENT ATRIAL FIBRILLATION (H): Primary | ICD-10-CM

## 2022-08-18 DIAGNOSIS — Z79.01 LONG TERM (CURRENT) USE OF ANTICOAGULANTS: ICD-10-CM

## 2022-08-18 LAB — INR (EXTERNAL): 2.1 (ref 2–3)

## 2022-08-18 NOTE — PROGRESS NOTES
ANTICOAGULATION MANAGEMENT     Savanna Rehman 81 year old female is on warfarin with therapeutic INR result. (Goal INR 2.0-3.0)    Recent labs: (last 7 days)     08/18/22  1057   INR 2.1       ASSESSMENT       Source(s): Chart Review and Home Care/Facility Nurse       Warfarin doses taken: Warfarin taken as instructed    Diet: No new diet changes identified    New illness, injury, or hospitalization: No    Medication/supplement changes: None noted    Signs or symptoms of bleeding or clotting: No    Previous INR: Supratherapeutic    Additional findings:  checks INRs on Tuesdays--moving checks back to that day       PLAN     Recommended plan for no diet, medication or health factor changes affecting INR     Dosing Instructions: Continue your current warfarin dose with next INR in 1 week    Summary  As of 8/18/2022    Full warfarin instructions:  2.5 mg every Tue, Thu, Sat; 3.75 mg all other days   Next INR check:  8/23/2022             Telephone call with Raj home care/facility nurse who verbalizes understanding and agrees to plan and who agrees to plan and repeated back plan correctly    Orders given to  Homecare nurse/facility to recheck    Education provided: Please call back if any changes to your diet, medications or how you've been taking warfarin    Plan made per [facility] anticoagulation protocol    Bella Montero, RN  Anticoagulation Clinic  8/18/2022    _______________________________________________________________________     Anticoagulation Episode Summary     Current INR goal:  2.0-3.0   TTR:  74.0 % (1 y)   Target end date:  Indefinite   Send INR reminders to:  NIKKI CHASE    Indications    Permanent atrial fibrillation (H) [I48.21]  Chronic atrial fibrillation (H) [I48.20]  Long term (current) use of anticoagulants [Z79.01]           Comments:  Home care          Anticoagulation Care Providers     Provider Role Specialty Phone number    Patti Suárez MD Referring Internal  Medicine [phone]

## 2022-08-23 ENCOUNTER — ANTICOAGULATION THERAPY VISIT (OUTPATIENT)
Dept: ANTICOAGULATION | Facility: CLINIC | Age: 82
End: 2022-08-23

## 2022-08-23 DIAGNOSIS — I48.20 CHRONIC ATRIAL FIBRILLATION (H): ICD-10-CM

## 2022-08-23 DIAGNOSIS — Z79.01 LONG TERM (CURRENT) USE OF ANTICOAGULANTS: ICD-10-CM

## 2022-08-23 DIAGNOSIS — I48.21 PERMANENT ATRIAL FIBRILLATION (H): Primary | ICD-10-CM

## 2022-08-23 LAB — INR (EXTERNAL): 2.4 (ref 0.9–1.1)

## 2022-08-23 NOTE — PROGRESS NOTES
ANTICOAGULATION MANAGEMENT     Savanna Rehman 81 year old female is on warfarin with therapeutic INR result. (Goal INR 2.0-3.0)    Recent labs: (last 7 days)     08/23/22  1425   INR 2.4*       ASSESSMENT       Source(s): Chart Review and Home Care/Facility Nurse       Warfarin doses taken: Warfarin taken as instructed    Diet: No new diet changes identified    New illness, injury, or hospitalization: No    Medication/supplement changes: None noted    Signs or symptoms of bleeding or clotting: No    Previous INR: Therapeutic last visit; previously outside of goal range    Additional findings: None       PLAN     Recommended plan for no diet, medication or health factor changes affecting INR     Dosing Instructions: Continue your current warfarin dose with next INR in 2 weeks       Summary  As of 8/23/2022    Full warfarin instructions:  2.5 mg every Tue, Thu, Sat; 3.75 mg all other days   Next INR check:  9/6/2022             Telephone call with Evelin home care/facility nurse who verbalizes understanding and agrees to plan    Orders given to  Homecare nurse/facility to recheck    Education provided: Goal range and significance of current result    Plan made per [facility] anticoagulation protocol    Miko West RN  Anticoagulation Clinic  8/23/2022    _______________________________________________________________________     Anticoagulation Episode Summary     Current INR goal:  2.0-3.0   TTR:  74.0 % (1 y)   Target end date:  Indefinite   Send INR reminders to:  NIKKI CHASE    Indications    Permanent atrial fibrillation (H) [I48.21]  Chronic atrial fibrillation (H) [I48.20]  Long term (current) use of anticoagulants [Z79.01]           Comments:  Home care          Anticoagulation Care Providers     Provider Role Specialty Phone number    Patti Suárez MD Referring Internal Medicine [phone]

## 2022-09-06 ENCOUNTER — ANTICOAGULATION THERAPY VISIT (OUTPATIENT)
Dept: ANTICOAGULATION | Facility: CLINIC | Age: 82
End: 2022-09-06

## 2022-09-06 DIAGNOSIS — I48.20 CHRONIC ATRIAL FIBRILLATION (H): ICD-10-CM

## 2022-09-06 DIAGNOSIS — I48.21 PERMANENT ATRIAL FIBRILLATION (H): Primary | ICD-10-CM

## 2022-09-06 DIAGNOSIS — Z79.01 LONG TERM (CURRENT) USE OF ANTICOAGULANTS: ICD-10-CM

## 2022-09-06 LAB — INR (EXTERNAL): 2.2 (ref 0.9–1.1)

## 2022-09-06 NOTE — PROGRESS NOTES
ANTICOAGULATION MANAGEMENT     Savanna Rehman 81 year old female is on warfarin with therapeutic INR result. (Goal INR 2.0-3.0)    Recent labs: (last 7 days)     09/06/22  1515   INR 2.2*       ASSESSMENT       Source(s): Chart Review and Home Care/Facility Nurse       Warfarin doses taken: Warfarin taken as instructed    Diet: No new diet changes identified    New illness, injury, or hospitalization: No    Medication/supplement changes: None noted    Signs or symptoms of bleeding or clotting: No    Previous INR: Thera x2    Additional findings: None       PLAN     Recommended plan for no diet, medication or health factor changes affecting INR     Dosing Instructions: Continue your current warfarin dose with next INR in 2 weeks       Summary  As of 9/6/2022    Full warfarin instructions:  2.5 mg every Tue, Thu, Sat; 3.75 mg all other days   Next INR check:  9/20/2022             Telephone call with Raj home care nurse who verbalizes understanding and agrees to plan    Orders given to  Homecare nurse/facility to recheck    Education provided: Contact [phone]  with any changes, questions or concerns.     Plan made per [facility] anticoagulation protocol    Sanjana Fox RN  Anticoagulation Clinic  9/6/2022    _______________________________________________________________________     Anticoagulation Episode Summary     Current INR goal:  2.0-3.0   TTR:  74.0 % (1 y)   Target end date:  Indefinite   Send INR reminders to:  NIKKI CHASE    Indications    Permanent atrial fibrillation (H) [I48.21]  Chronic atrial fibrillation (H) [I48.20]  Long term (current) use of anticoagulants [Z79.01]           Comments:  Home care          Anticoagulation Care Providers     Provider Role Specialty Phone number    Patti Suárez MD Referring Internal Medicine [phone]

## 2022-09-13 ENCOUNTER — TELEPHONE (OUTPATIENT)
Dept: INTERNAL MEDICINE | Facility: CLINIC | Age: 82
End: 2022-09-13

## 2022-09-13 NOTE — TELEPHONE ENCOUNTER
[facility] * missed visit report received via fax     Forms in Dr[employer] Jay mail box for review and signature.

## 2022-09-14 NOTE — TELEPHONE ENCOUNTER
[facility]  5 SN orders  Dr. Lauren's in basket   Fax     September 12, 2022  Álvaro Palm MD  to Me      2:33 PM   Yes OK        MS    11:56 AM  You routed this conversation to Álvaro Palm MD    Me     MS    11:56 AM  Note  Pt needs to be scheduled for a Colonoscopy.  Pt is currently taking Eliquis.  Per endoscopy protocol it should be held x 2 days prior.  Ok to hold?  Please advise.  Thanks

## 2022-09-16 ENCOUNTER — TELEPHONE (OUTPATIENT)
Dept: INTERNAL MEDICINE | Facility: CLINIC | Age: 82
End: 2022-09-16

## 2022-09-16 NOTE — TELEPHONE ENCOUNTER
9/18-11/16 HOME HEALTH CERTIFICATION; received via fax. Orders/Forms in your mailbox to be signed.

## 2022-09-19 DIAGNOSIS — A04.72 C. DIFFICILE COLITIS: ICD-10-CM

## 2022-09-19 DIAGNOSIS — Z53.9 DIAGNOSIS NOT YET DEFINED: Primary | ICD-10-CM

## 2022-09-19 PROCEDURE — G0179 MD RECERTIFICATION HHA PT: HCPCS | Performed by: INTERNAL MEDICINE

## 2022-09-20 ENCOUNTER — ANTICOAGULATION THERAPY VISIT (OUTPATIENT)
Dept: ANTICOAGULATION | Facility: CLINIC | Age: 82
End: 2022-09-20

## 2022-09-20 DIAGNOSIS — I48.21 PERMANENT ATRIAL FIBRILLATION (H): Primary | ICD-10-CM

## 2022-09-20 DIAGNOSIS — I48.20 CHRONIC ATRIAL FIBRILLATION (H): ICD-10-CM

## 2022-09-20 DIAGNOSIS — Z79.01 LONG TERM (CURRENT) USE OF ANTICOAGULANTS: ICD-10-CM

## 2022-09-20 LAB — INR (EXTERNAL): 2.9 (ref 0.9–1.1)

## 2022-09-20 NOTE — PROGRESS NOTES
ANTICOAGULATION MANAGEMENT     Savanna Rehman 81 year old female is on warfarin with therapeutic INR result. (Goal INR 2.0-3.0)    Recent labs: (last 7 days)     09/20/22  1335   INR 2.9*       ASSESSMENT       Source(s): Chart Review and Home Care/Facility Nurse       Warfarin doses taken: Missed dose(s) may be affecting INR    Diet: No new diet changes identified    New illness, injury, or hospitalization: No    Medication/supplement changes: None noted    Signs or symptoms of bleeding or clotting: No    Previous INR: Therapeutic last 2(+) visits    Additional findings: None       PLAN     Recommended plan for no diet, medication or health factor changes affecting INR     Dosing Instructions: Continue your current warfarin dose with next INR in 2 weeks       Summary  As of 9/20/2022    Full warfarin instructions:  2.5 mg every Tue, Thu, Sat; 3.75 mg all other days   Next INR check:  10/4/2022             Telephone call with Raj home care nurse who agrees to plan and repeated back plan correctly    Orders given to  Homecare nurse/facility to recheck    Education provided: Please call back if any changes to your diet, medications or how you've been taking warfarin, Monitoring for bleeding signs and symptoms and Monitoring for clotting signs and symptoms    Plan made per [facility] anticoagulation protocol    Azul Whitaker, RN  Anticoagulation Clinic  9/20/2022    _______________________________________________________________________     Anticoagulation Episode Summary     Current INR goal:  2.0-3.0   TTR:  74.0 % (1 y)   Target end date:  Indefinite   Send INR reminders to:  NIKKI CHASE    Indications    Permanent atrial fibrillation (H) [I48.21]  Chronic atrial fibrillation (H) [I48.20]  Long term (current) use of anticoagulants [Z79.01]           Comments:  Home care          Anticoagulation Care Providers     Provider Role Specialty Phone number    Patti Suárez MD Referring Internal  Medicine [phone]

## 2022-09-27 ENCOUNTER — ANTICOAGULATION THERAPY VISIT (OUTPATIENT)
Dept: ANTICOAGULATION | Facility: CLINIC | Age: 82
End: 2022-09-27

## 2022-09-27 DIAGNOSIS — I48.21 PERMANENT ATRIAL FIBRILLATION (H): Primary | ICD-10-CM

## 2022-09-27 DIAGNOSIS — Z79.01 LONG TERM (CURRENT) USE OF ANTICOAGULANTS: ICD-10-CM

## 2022-09-27 DIAGNOSIS — I48.20 CHRONIC ATRIAL FIBRILLATION (H): ICD-10-CM

## 2022-09-27 LAB — INR (EXTERNAL): 1.7 (ref 0.9–1.1)

## 2022-09-27 NOTE — PROGRESS NOTES
A. Fib       Risk stratification for thromboembolism: moderate (2017 [facility] periprocedure pathway for NVAF expert Cconsensus)       [id number] = 5     NVAF: 2017 [facility] periprocedure pathway for NVAF advises NO bridge for moderate risk stratification ([id number] score 5-6 or hx of stroke, TIA or systemic embolism with increased risk of bleeding    Although 2012 CHEST guidelines advise no hold for dental extraction of 1-2 teeth, reasonable to do 3 day hold    Jayleen Oconnor PharmD BCACP  Anticoagulation Clinical Pharmacist

## 2022-09-27 NOTE — PROGRESS NOTES
ANTICOAGULATION MANAGEMENT     Savanna Rehman 81 year old female is on warfarin with subtherapeutic INR result. (Goal INR 2.0-3.0)    Recent labs: (last 7 days)     09/27/22  1143   INR 1.7*       ASSESSMENT       Source(s): Chart Review, Patient/Caregiver Call and Home Care/Facility Nurse       Warfarin doses taken: Warfarin taken as instructed and Patient was advised to hold 3 days prior to tooth extraction on 9/28 by DDS - spoke with [facility] ([place]) and confirmed the below:     [facility] [place]   DDS   Dr. Jose Monteiro,    [phone]   INR to be between below 3.0      Diet: No new diet changes identified    New illness, injury, or hospitalization: No    Medication/supplement changes: None noted    Signs or symptoms of bleeding or clotting: No    Previous INR: Therapeutic last 2(+) visits    Additional findings: None       PLAN     Recommended plan for temporary change(s) affecting INR     Dosing Instructions: Restart warfarin tonight 9/27, then take 5mg 9/28 post dental procedure and resume maintenance dose then recheck INR in 1 week.     Summary  As of 9/27/2022    Full warfarin instructions:  9/28: 5 mg; Otherwise 2.5 mg every Tue, Thu, Sat; 3.75 mg all other days   Next INR check:  10/4/2022             Telephone call with Raj home care nurse who verbalizes understanding and agrees to plan and who agrees to plan and repeated back plan correctly    Orders given to  Homecare nurse/facility to recheck    Education provided: Goal range and significance of current result, Monitoring for bleeding signs and symptoms, Monitoring for clotting signs and symptoms, When to seek medical attention/emergency care, Importance of notifying clinic of upcoming surgeries and procedures 2 weeks in advance and Contact [phone]  with any changes, questions or concerns.     Plan made per [facility] anticoagulation protocol    Sanjana Fox RN  Anticoagulation  Clinic  9/27/2022    _______________________________________________________________________     Anticoagulation Episode Summary     Current INR goal:  2.0-3.0   TTR:  73.5 % (1 y)   Target end date:  Indefinite   Send INR reminders to:  NIKKI CHASE    Indications    Permanent atrial fibrillation (H) [I48.21]  Chronic atrial fibrillation (H) [I48.20]  Long term (current) use of anticoagulants [Z79.01]           Comments:  Home care          Anticoagulation Care Providers     Provider Role Specialty Phone number    Patti Suárez MD Referring Internal Medicine [phone]

## 2022-09-28 ENCOUNTER — DOCUMENTATION ONLY (OUTPATIENT)
Dept: ANTICOAGULATION | Facility: CLINIC | Age: 82
End: 2022-09-28

## 2022-09-28 DIAGNOSIS — I48.20 CHRONIC ATRIAL FIBRILLATION (H): Primary | ICD-10-CM

## 2022-10-04 ENCOUNTER — ANTICOAGULATION THERAPY VISIT (OUTPATIENT)
Dept: ANTICOAGULATION | Facility: CLINIC | Age: 82
End: 2022-10-04

## 2022-10-04 DIAGNOSIS — E11.42 DIABETIC POLYNEUROPATHY ASSOCIATED WITH TYPE 2 DIABETES MELLITUS (H): ICD-10-CM

## 2022-10-04 DIAGNOSIS — I48.20 CHRONIC ATRIAL FIBRILLATION (H): ICD-10-CM

## 2022-10-04 LAB — INR (EXTERNAL): 2.6 (ref 0.9–1.1)

## 2022-10-04 NOTE — PROGRESS NOTES
ANTICOAGULATION MANAGEMENT     Savanna Rehman 81 year old female is on warfarin with therapeutic INR result. (Goal INR 2.0-3.0)    Recent labs: (last 7 days)     10/04/22  0000   INR 2.6*       ASSESSMENT       Source(s): Chart Review and Home Care/Facility Nurse       Warfarin doses taken: Warfarin taken as instructed    Diet: No new diet changes identified    New illness, injury, or hospitalization: Yes: tooth extraction on 9/28/22. Pt is back to baseline now with no issues.    Medication/supplement changes: None noted    Signs or symptoms of bleeding or clotting: No    Previous INR: Subtherapeutic    Additional findings: Home Care to recheck INR in 2 weeks.       PLAN     Dosing Instructions: Continue your current warfarin dose with next INR in 2 weeks       Summary  As of 10/4/2022    Full warfarin instructions:  2.5 mg every Tue, Thu, Sat; 3.75 mg all other days   Next INR check:  10/18/2022             Telephone call with Raj home care nurse who agrees to plan and repeated back plan correctly    Orders given to  Homecare nurse/facility to recheck    Education provided: Importance of notifying clinic for changes in medications; a sooner lab recheck maybe needed. and Contact [phone]  with any changes, questions or concerns.     Plan made per [facility] anticoagulation protocol    Tita Luna RN  Anticoagulation Clinic  10/4/2022    _______________________________________________________________________     Anticoagulation Episode Summary     Current INR goal:  2.0-3.0   TTR:  72.9 % (1 y)   Target end date:  Indefinite   Send INR reminders to:  [facility]    Indications    Permanent atrial fibrillation (H) [I48.21]  Chronic atrial fibrillation (H) [I48.20]  Long term (current) use of anticoagulants [Z79.01]           Comments:  Home care          Anticoagulation Care Providers     Provider Role Specialty Phone number    Patti Suárez MD Referring Internal Medicine [phone]

## 2022-10-06 RX ORDER — GLIPIZIDE 2.5 MG/1
TABLET, EXTENDED RELEASE ORAL
Qty: 180 TABLET | Refills: 3 | Status: SHIPPED | OUTPATIENT
Start: 2022-10-06 | End: 2023-02-06

## 2022-10-06 NOTE — TELEPHONE ENCOUNTER
Warfarin Tabs 2.5 mg  Routing refill request to provider for review/approval because:  Managed by Anticoagulation Clinic

## 2022-10-06 NOTE — TELEPHONE ENCOUNTER
ANTICOAGULATION MANAGEMENT:  Medication Refill    Anticoagulation Summary  As of 10/4/2022    Warfarin maintenance plan:  2.5 mg (2.5 mg x 1) every Tue, Thu, Sat; 3.75 mg (2.5 mg x 1.5) all other days   Next INR check:  10/18/2022   Target end date:  Indefinite    Indications    Permanent atrial fibrillation (H) [I48.21]  Chronic atrial fibrillation (H) [I48.20]  Long term (current) use of anticoagulants [Z79.01]             Anticoagulation Care Providers     Provider Role Specialty Phone number    Patti Suárez MD Referring Internal Medicine [phone]          Visit with referring provider/group within last year: Yes    [facility] referral signed within last year: No, 9/28/2022 encounter sent to provider to update referral.    Savanna does NOT meet all criteria for refill: sending to provide as it is a mail order pharmacy.    Mahsa Scott RN  Anticoagulation Clinic

## 2022-10-11 RX ORDER — WARFARIN SODIUM 2.5 MG/1
TABLET ORAL
Qty: 135 TABLET | Refills: 1 | Status: ON HOLD | OUTPATIENT
Start: 2022-10-11 | End: 2023-06-13

## 2022-10-17 ENCOUNTER — TELEPHONE (OUTPATIENT)
Dept: INTERNAL MEDICINE | Facility: CLINIC | Age: 82
End: 2022-10-17

## 2022-10-17 DIAGNOSIS — R30.0 DYSURIA: Primary | ICD-10-CM

## 2022-10-17 NOTE — TELEPHONE ENCOUNTER
Dr Schmidt advises that she schedule Telephone or Virtual visit.     Attempted to contact pt. Left message to call clinic to schedule.     Or can go to Urgent Care.

## 2022-10-17 NOTE — TELEPHONE ENCOUNTER
Patient calls to report she has had UTI symptoms for two weeks.  Patient reports she has terrible stomach pain. Patient wears briefs so she does not make it to the bathroom. Patient is requesting an antibiotic.

## 2022-10-18 ENCOUNTER — ANTICOAGULATION THERAPY VISIT (OUTPATIENT)
Dept: ANTICOAGULATION | Facility: CLINIC | Age: 82
End: 2022-10-18
Payer: COMMERCIAL

## 2022-10-18 DIAGNOSIS — I48.20 CHRONIC ATRIAL FIBRILLATION (H): ICD-10-CM

## 2022-10-18 DIAGNOSIS — Z79.01 LONG TERM (CURRENT) USE OF ANTICOAGULANTS: ICD-10-CM

## 2022-10-18 DIAGNOSIS — I48.21 PERMANENT ATRIAL FIBRILLATION (H): Primary | ICD-10-CM

## 2022-10-18 DIAGNOSIS — R30.0 DYSURIA: ICD-10-CM

## 2022-10-18 LAB — INR (EXTERNAL): 4.3 (ref 0.9–1.1)

## 2022-10-18 NOTE — LETTER
October 24, 2022      Savanna [facility] She[facility]  [id number]   [place] [address]        Dear ,    We are writing to inform you of your test results.    Urine culture shows mixed bacteria, likely contamination,no definite infection.        Resulted Orders   UA with Microscopic reflex to Culture - lab collect   Result Value Ref Range    Color Urine Yellow Colorless, Straw, Light Yellow, Yellow    Appearance Urine Slightly Cloudy (A) Clear    Glucose Urine Negative Negative mg/dL    Bilirubin Urine Negative Negative    Ketones Urine Negative Negative mg/dL    Specific Gravity Urine 1.020 1.003 - 1.035    Blood Urine Trace (A) Negative    pH Urine 5.5 5.0 - 7.0    Protein Albumin Urine Negative Negative mg/dL    Urobilinogen Urine 0.2 0.2, 1.0 E.U./dL    Nitrite Urine Positive (A) Negative    Leukocyte Esterase Urine Small (A) Negative   Urine Microscopic Exam   Result Value Ref Range    Bacteria Urine Many (A) None Seen /HPF    RBC Urine 0-2 0-2 /HPF /HPF    WBC Urine 10-25 (A) 0-5 /HPF /HPF    Squamous Epithelials Urine Few (A) None Seen /LPF   Urine Culture   Result Value Ref Range    Culture >100,000 CFU/mL Mixture of urogenital thao     Narrative    Multiple morphotypes present with no predominant organism.  Growth consistent with probable contamination during collection.  Suggest repeat specimen if clinically indicated.        If you have any questions or concerns, please call the clinic at the number listed above.       Sincerely,      Agusto Schmidt MD

## 2022-10-18 NOTE — TELEPHONE ENCOUNTER
Patient does not have [facility]. No virtual appts available today. Patient wants to talk with a RN  Ok to call and  [phone]

## 2022-10-18 NOTE — TELEPHONE ENCOUNTER
Call back from patient. States Dr. Lauren would never ask her to go to urgent care. Patient states she doesn't have a ride to go to urgent care. Patient is requesting an order for a urine test. Offered appointment with Dr. Lauren tomorrow. Patient declined this stating she does not have a ride tomorrow either. Offered virtual visit. Patient is insistent she should have the urine sample done first and is willing to schedule a virtual visit to discuss the results. States she has had symptoms for 2 weeks and is afraid she has let this go too long already and it will go to her kidneys.    Patient was willing to schedule a virtual visit but wants an order for UA entered as she has a friend who can drop off a urine sample tomorrow morning.    Virtual visit scheduled 10/20/22 with Maria Fernanda Shah. Patient is hoping this viist will be to discuss the results of the urine sample.

## 2022-10-18 NOTE — PROGRESS NOTES
"ANTICOAGULATION MANAGEMENT     Savanna Rehman 81 year old female is on warfarin with supratherapeutic INR result. (Goal INR 2.0-3.0)    Recent labs: (last 7 days)     10/18/22  1252   INR 4.3*       ASSESSMENT       Source(s): Chart Review and Home Care/Facility Nurse       Warfarin doses taken: Warfarin taken as instructed    Diet: Change in alcohol intake may be affecting INR. patient has been having a martini every 3 days     New illness, injury, or hospitalization: Yes: patient feels like she has a UTI.  Patient has a call into provider. Only symptom is \"stomach pain\" and patient states \"that's enough of a symptom for me\".    Patient also states she has C-diff and has been having some loose stools.    Medication/supplement changes: None noted    Signs or symptoms of bleeding or clotting: No    Previous INR: Therapeutic last 2(+) visits    Additional findings: Patient states she is under a lot of stress because she is moving and turning 82 next month.  she states she has been drinking more alcohol than usual but its 1 martini every 3 days.       PLAN     Recommended plan for temporary change(s) affecting INR     Dosing Instructions: hold today and partial hold doses then continue your current warfarin dose with next INR in 1 week       Summary  As of 10/18/2022    Full warfarin instructions:  10/18: Hold; 10/19: 1.25 mg; Otherwise 2.5 mg every Tue, Thu, Sat; 3.75 mg all other days   Next INR check:  10/25/2022             Telephone call with Raj home care nurse who agrees to plan and repeated back plan correctly    Orders given to  Homecare nurse/facility to recheck    Education provided: Potential interaction between warfarin and alcohol, Monitoring for bleeding signs and symptoms and When to seek medical attention/emergency care    Plan made per [facility] anticoagulation protocol    Roya Sky RN  Anticoagulation " Clinic  10/18/2022    _______________________________________________________________________     Anticoagulation Episode Summary     Current INR goal:  2.0-3.0   TTR:  71.0 % (1 y)   Target end date:  Indefinite   Send INR reminders to:  NIKKI CHASE    Indications    Permanent atrial fibrillation (H) [I48.21]  Chronic atrial fibrillation (H) [I48.20]  Long term (current) use of anticoagulants [Z79.01]           Comments:  Home care          Anticoagulation Care Providers     Provider Role Specialty Phone number    Patti Suárez MD Referring Internal Medicine [phone]

## 2022-10-19 LAB
ALBUMIN UR-MCNC: NEGATIVE MG/DL
APPEARANCE UR: ABNORMAL
BACTERIA #/AREA URNS HPF: ABNORMAL /HPF
BILIRUB UR QL STRIP: NEGATIVE
COLOR UR AUTO: YELLOW
GLUCOSE UR STRIP-MCNC: NEGATIVE MG/DL
HGB UR QL STRIP: ABNORMAL
KETONES UR STRIP-MCNC: NEGATIVE MG/DL
LEUKOCYTE ESTERASE UR QL STRIP: ABNORMAL
NITRATE UR QL: POSITIVE
PH UR STRIP: 5.5 [PH] (ref 5–7)
RBC #/AREA URNS AUTO: ABNORMAL /HPF
SP GR UR STRIP: 1.02 (ref 1–1.03)
SQUAMOUS #/AREA URNS AUTO: ABNORMAL /LPF
UROBILINOGEN UR STRIP-ACNC: 0.2 E.U./DL
WBC #/AREA URNS AUTO: ABNORMAL /HPF

## 2022-10-19 PROCEDURE — 87086 URINE CULTURE/COLONY COUNT: CPT | Performed by: INTERNAL MEDICINE

## 2022-10-19 PROCEDURE — 81001 URINALYSIS AUTO W/SCOPE: CPT

## 2022-10-20 ENCOUNTER — VIRTUAL VISIT (OUTPATIENT)
Dept: INTERNAL MEDICINE | Facility: CLINIC | Age: 82
End: 2022-10-20
Payer: COMMERCIAL

## 2022-10-20 ENCOUNTER — TELEPHONE (OUTPATIENT)
Dept: INTERNAL MEDICINE | Facility: CLINIC | Age: 82
End: 2022-10-20

## 2022-10-20 DIAGNOSIS — R30.0 DYSURIA: Primary | ICD-10-CM

## 2022-10-20 DIAGNOSIS — Z86.19 HISTORY OF CLOSTRIDIOIDES DIFFICILE INFECTION: ICD-10-CM

## 2022-10-20 PROCEDURE — 99215 OFFICE O/P EST HI 40 MIN: CPT | Mod: 95 | Performed by: NURSE PRACTITIONER

## 2022-10-20 NOTE — PROGRESS NOTES
"Savanna is a 81 year old who is being evaluated via a billable video visit.      How would you like to obtain your AVS? Mail a copy  If the video visit is dropped, the invitation should be resent by: Text to cell phone: [phone]  Will anyone else be joining your video visit? No        Assessment & Plan     Dysuria  Urine appears positive  Will await culture to know if treatment warranted with history c diff   Call to Dr Granados ID to see how she wants to treat her if positive   Previous UTI was given keflex 500mg BID x 7 days   vanco 125 mg BID x 10 days      History of Clostridioides difficile infection  She feels this is current - encouraged her to try to get stool test in today but she is not sure she is able to provide specimen   Daughter coming around 5 pm to bring her to bank   Will try to do and bring in   Her description of her  stool is not consistent with what I have seen with c diff- mushy stool once a day or with certain foods- but she is certain this is similar to when she was positive             60+ minutes spent on the date of the encounter doing chart review, history and exam, documentation and further activities per the note       BMI:   Estimated body mass index is 38.47 kg/m  as calculated from the following:    Height as of 8/4/22: 1.727 m (5' 8\").    Weight as of 6/8/22: 114.8 kg (253 lb).       Patient Instructions   Await culture results    Call to Dr Granados office to see how she wants us to treat UTI - if culture positive     Bring stool tonight to check for c diff if possible            Return in about 6 months (around 4/20/2023).    BATSHEVA Padilla CNP  [facility]    Mamadou Corrales is a 81 year old, presenting for the following health issues:    Chief Complaint   Patient presents with     Bladder Problems     Discomfort from her pubic bone and unable to completely empty her bladder.     HPI     Urine was done yesterday and was positive   Culture " pending     She has history c diff and needs to be treated for UTI   Last positive 1/2022    c diff since 10/2015  Dr Granados asked for another specimen     Dr Wilson - urology     Daughter is RN and works with insurance company  She is busy       Review of Systems   Constitutional, HEENT, cardiovascular, pulmonary, GI, , musculoskeletal, neuro, skin, endocrine and psych systems are negative, except as otherwise noted.      Objective           Vitals:  No vitals were obtained today due to virtual visit.    Physical Exam   GENERAL: alert and no distress  EYES: Eyes grossly normal to inspection.  No discharge or erythema, or obvious scleral/conjunctival abnormalities.  RESP: No audible wheeze, cough, or visible cyanosis.  No visible retractions or increased work of breathing.    SKIN: Visible skin clear. No significant rash, abnormal pigmentation or lesions.  NEURO: Cranial nerves grossly intact.  Mentation and speech appropriate for age.  PSYCH: Mentation appears normal, affect normal/bright, judgement and insight intact, normal speech and appearance well-groomed.    Color Urine (no units)   Date Value   10/19/2022 Yellow   07/08/2021 Yellow     Appearance Urine (no units)   Date Value   10/19/2022 Slightly Cloudy (A)   07/08/2021 Clear     Glucose Urine (mg/dL)   Date Value   10/19/2022 Negative   07/08/2021 Negative     Bilirubin Urine (no units)   Date Value   10/19/2022 Negative   07/08/2021 Negative     Ketones Urine (mg/dL)   Date Value   10/19/2022 Negative   07/08/2021 Negative     Specific Gravity Urine (no units)   Date Value   10/19/2022 1.020   07/08/2021 1.015     pH Urine   Date Value   10/19/2022 5.5   07/08/2021 6.0 pH     Protein Albumin Urine (mg/dL)   Date Value   10/19/2022 Negative   07/08/2021 Negative     Urobilinogen Urine   Date Value   10/19/2022 0.2 E.U./dL   07/08/2021 0.2 EU/dL     Nitrite Urine (no units)   Date Value   10/19/2022 Positive (A)   07/08/2021 Positive (A)     Leukocyte  Esterase Urine (no units)   Date Value   10/19/2022 Small (A)   07/08/2021 Large (A)                 Video-Visit Details    Video Start Time: 1140    Type of service:  Video Visit  Se fell off call and multiple trys on phone from clinic  and test and doximety phone   She called clinic and eventually was transferred to complete visit    Visit was very long besides the call drop as she is a      Video End Time:12:55 PM    Originating Location (pt. Location): Home    Distant Location (provider location):  On-site    [facility]

## 2022-10-20 NOTE — TELEPHONE ENCOUNTER
Dr Granados office called back and will take over the urine - they will watch for culture and stool test and treat once that is back   They can see results in system   So we do not need to do anything further

## 2022-10-20 NOTE — NURSING NOTE
"Chief Complaint   Patient presents with     Bladder Problems     Discomfort from her pubic bone and unable to completely empty her bladder.     initial LMP  (LMP Unknown)  Estimated body mass index is 38.47 kg/m  as calculated from the following:    Height as of 8/4/22: 1.727 m (5' 8\").    Weight as of 6/8/22: 114.8 kg (253 lb)..  bp completed using cuff size NA (Not Taken)  JOSE BOWIE LPN  "

## 2022-10-20 NOTE — TELEPHONE ENCOUNTER
Called Dr Granados- infectious disease - office to ask if patient has UTI what she wants us to do re treatment   Past treatment was keflex 500 mg bid x 7 days   vanco 125 mg bid x 10 days     Patient state she still has c diff  She is going to try and get a stool test in tonight but usually only goes once a day in am - mushy stool - not watery - this is how it was when she was positive earlier this year

## 2022-10-20 NOTE — PATIENT INSTRUCTIONS
Await culture results    Call to Dr Granados office to see how she wants us to treat UTI - if culture positive     Bring stool tonight to check for c diff if possible

## 2022-10-21 LAB — BACTERIA UR CULT: NORMAL

## 2022-10-25 ENCOUNTER — ANTICOAGULATION THERAPY VISIT (OUTPATIENT)
Dept: ANTICOAGULATION | Facility: CLINIC | Age: 82
End: 2022-10-25

## 2022-10-25 DIAGNOSIS — Z79.01 LONG TERM (CURRENT) USE OF ANTICOAGULANTS: ICD-10-CM

## 2022-10-25 DIAGNOSIS — I48.20 CHRONIC ATRIAL FIBRILLATION (H): ICD-10-CM

## 2022-10-25 DIAGNOSIS — I48.21 PERMANENT ATRIAL FIBRILLATION (H): Primary | ICD-10-CM

## 2022-10-25 LAB — INR (EXTERNAL): 2.1 (ref 0.9–1.1)

## 2022-10-25 NOTE — PROGRESS NOTES
ANTICOAGULATION MANAGEMENT     Savanna Rehman 81 year old female is on warfarin with therapeutic INR result. (Goal INR 2.0-3.0)    Recent labs: (last 7 days)     10/25/22  1211   INR 2.1*       ASSESSMENT       Source(s): Chart Review and Home Care/Facility Nurse       Warfarin doses taken: Warfarin taken as instructed    Diet: No new diet changes identified    New illness, injury, or hospitalization: Yes: possible bladder infection and C.diff flare without diarrhea (abdominal pain).    Medication/supplement changes: Vanco & Cehpalexin x 10 days (increased risk for bleeding)    Signs or symptoms of bleeding or clotting: No    Previous INR: Supratherapeutic    Additional findings: Place on Vanco for C.diff flare but denies loose stool. just abdominal pain; patient is also moving next week.        PLAN     Recommended plan for temporary change(s) affecting INR     Dosing Instructions: Continue your current warfarin dose with next INR in 1 week       Summary  As of 10/25/2022    Full warfarin instructions:  2.5 mg every Tue, Thu, Sat; 3.75 mg all other days; Starting 10/25/2022   Next INR check:  11/1/2022             Telephone call with Anthony home care nurse who agrees to plan and repeated back plan correctly    Orders given to  Homecare nurse/facility to recheck    Education provided:     Please call back if any changes to your diet, medications or how you've been taking warfarin    Plan made per [facility] anticoagulation protocol    Azul Whitaker, RN  Anticoagulation Clinic  10/25/2022    _______________________________________________________________________     Anticoagulation Episode Summary     Current INR goal:  2.0-3.0   TTR:  71.8 % (1 y)   Target end date:  Indefinite   Send INR reminders to:  NIKKI CHASE    Indications    Permanent atrial fibrillation (H) [I48.21]  Chronic atrial fibrillation (H) [I48.20]  Long term (current) use of anticoagulants [Z79.01]           Comments:  Home care           Anticoagulation Care Providers     Provider Role Specialty Phone number    Patti Suárez MD Referring Internal Medicine [phone]

## 2022-10-26 LAB — C DIFF TOX B STL QL: NEGATIVE

## 2022-10-26 PROCEDURE — 87493 C DIFF AMPLIFIED PROBE: CPT | Performed by: INTERNAL MEDICINE

## 2022-10-31 ENCOUNTER — MEDICAL CORRESPONDENCE (OUTPATIENT)
Dept: HEALTH INFORMATION MANAGEMENT | Facility: CLINIC | Age: 82
End: 2022-10-31

## 2022-11-01 ENCOUNTER — MEDICAL CORRESPONDENCE (OUTPATIENT)
Dept: HEALTH INFORMATION MANAGEMENT | Facility: CLINIC | Age: 82
End: 2022-11-01

## 2022-11-01 ENCOUNTER — TELEPHONE (OUTPATIENT)
Dept: INTERNAL MEDICINE | Facility: CLINIC | Age: 82
End: 2022-11-01

## 2022-11-01 ENCOUNTER — ANTICOAGULATION THERAPY VISIT (OUTPATIENT)
Dept: ANTICOAGULATION | Facility: CLINIC | Age: 82
End: 2022-11-01

## 2022-11-01 DIAGNOSIS — I48.20 CHRONIC ATRIAL FIBRILLATION (H): ICD-10-CM

## 2022-11-01 DIAGNOSIS — Z79.01 LONG TERM (CURRENT) USE OF ANTICOAGULANTS: ICD-10-CM

## 2022-11-01 DIAGNOSIS — I48.21 PERMANENT ATRIAL FIBRILLATION (H): Primary | ICD-10-CM

## 2022-11-01 LAB — INR (EXTERNAL): 2.6 (ref 0.9–1.1)

## 2022-11-01 NOTE — PROGRESS NOTES
"ANTICOAGULATION MANAGEMENT     Savanna Rehman 81 year old female is on warfarin with therapeutic INR result. (Goal INR 2.0-3.0)    Recent labs: (last 7 days)     11/01/22  1259   INR 2.6*       ASSESSMENT       Source(s): Chart Review and Home Care/Facility Nurse       Warfarin doses taken: Warfarin taken as instructed    Diet: No new diet changes identified    New illness, injury, or hospitalization: Still having some lower abdominal \"pressure\" despite completing treatment for c diff and UTI per below.    Medication/supplement changes: Finished vancomycin and cephalexin yesterday.     Signs or symptoms of bleeding or clotting: No    Previous INR: Therapeutic last visit; previously outside of goal range    Additional findings: None       PLAN     Recommended plan for temporary change(s) affecting INR     Dosing Instructions: Continue your current warfarin dose with next INR in 1 week       Summary  As of 11/1/2022    Full warfarin instructions:  2.5 mg every Tue, Thu, Sat; 3.75 mg all other days; Starting 11/1/2022   Next INR check:  11/8/2022             Telephone call with Evelin home care nurse who verbalizes understanding and agrees to plan    Orders given to  Homecare nurse/facility to recheck    Education provided:     Goal range and lab monitoring: goal range and significance of current result    Plan made per [facility] anticoagulation protocol    Miko West RN  Anticoagulation Clinic  11/1/2022    _______________________________________________________________________     Anticoagulation Episode Summary     Current INR goal:  2.0-3.0   TTR:  73.4 % (1 y)   Target end date:  Indefinite   Send INR reminders to:  NIKKI CHASE    Indications    Permanent atrial fibrillation (H) [I48.21]  Chronic atrial fibrillation (H) [I48.20]  Long term (current) use of anticoagulants [Z79.01]           Comments:  Home care          Anticoagulation Care Providers     Provider Role Specialty Phone number    Jose Armando, " Patti Padilla MD Referring Internal Medicine [phone]

## 2022-11-03 ENCOUNTER — TELEPHONE (OUTPATIENT)
Dept: INTERNAL MEDICINE | Facility: CLINIC | Age: 82
End: 2022-11-03

## 2022-11-03 NOTE — TELEPHONE ENCOUNTER
Patient called, missed her virtual visit this morning. Something wrong with her phone.     Patient requesting to speak to nurse in [place]. Advised that appointment would need to be rescheduled. Patient opted to hang up and try again.     NARA LEVINE RN on 11/3/2022 at 1:32 PM   [facility]

## 2022-11-03 NOTE — TELEPHONE ENCOUNTER
Patient is calling after having supposed to have a telephone visit at 11:00am.  She said she did not ever get a call for the appointment. Per chart review, multiple attempts to reach her. Did verify patient's phone number listed is correct. We did discuss if patient's phone is set to block or automatically divert spam calls, she said no it is not, she gets spam calls. Attempted to reach out to MA working with provider but unavailable. Advised patient message will be sent to provider and team to determine if still possible to be called today or if they can please reschedule. We did offer an appointment with another provider tomorrow, however she did decline at this time. Wants to hear back from Dr. Lauren before she does this. She is not able to set up [employer],unable to figure out how to do this she said. Asked to please have someone from Dr. Lauren's clinic reach out to her.

## 2022-11-04 ENCOUNTER — TELEPHONE (OUTPATIENT)
Dept: INTERNAL MEDICINE | Facility: CLINIC | Age: 82
End: 2022-11-04

## 2022-11-04 NOTE — TELEPHONE ENCOUNTER
I called patient 3 times on Thursday beginning at about 9:00 a.m. I left 2 messages informing the patient to call back so that I could check her in for her telephone visit. I called for an final time 3 minutes before her appointment time explaining to her that if she did not call back soon she would need to reschedule. I gave her the number at my desk to minimize time.

## 2022-11-04 NOTE — TELEPHONE ENCOUNTER
MA rooms the patient in the office and virtual. Roseline has tried to connect with the patient. I will forward this message to Roseline.    The appointment was at 11 and the patient called us at 1:12 pm.     If any cancellations today we will try to call her today. Otherwise needs reschedule. May try to double book 10:30 Monday Nov 7

## 2022-11-07 ENCOUNTER — OFFICE VISIT (OUTPATIENT)
Dept: INTERNAL MEDICINE | Facility: CLINIC | Age: 82
End: 2022-11-07
Payer: COMMERCIAL

## 2022-11-07 VITALS
TEMPERATURE: 95.8 F | DIASTOLIC BLOOD PRESSURE: 83 MMHG | RESPIRATION RATE: 18 BRPM | HEART RATE: 87 BPM | HEIGHT: 68 IN | OXYGEN SATURATION: 98 % | WEIGHT: 250.1 LBS | SYSTOLIC BLOOD PRESSURE: 152 MMHG | BODY MASS INDEX: 37.9 KG/M2

## 2022-11-07 DIAGNOSIS — N18.30 STAGE 3 CHRONIC KIDNEY DISEASE, UNSPECIFIED WHETHER STAGE 3A OR 3B CKD (H): ICD-10-CM

## 2022-11-07 DIAGNOSIS — R21 PERINEAL RASH IN FEMALE: Primary | ICD-10-CM

## 2022-11-07 DIAGNOSIS — R42 DIZZINESS: ICD-10-CM

## 2022-11-07 DIAGNOSIS — Z11.59 ENCOUNTER FOR HEPATITIS C SCREENING TEST FOR LOW RISK PATIENT: ICD-10-CM

## 2022-11-07 DIAGNOSIS — E11.42 DIABETIC POLYNEUROPATHY ASSOCIATED WITH TYPE 2 DIABETES MELLITUS (H): ICD-10-CM

## 2022-11-07 DIAGNOSIS — R11.2 NAUSEA AND VOMITING, UNSPECIFIED VOMITING TYPE: ICD-10-CM

## 2022-11-07 DIAGNOSIS — M62.81 GENERALIZED MUSCLE WEAKNESS: ICD-10-CM

## 2022-11-07 DIAGNOSIS — K52.9 CHRONIC DIARRHEA: ICD-10-CM

## 2022-11-07 DIAGNOSIS — Z11.59 NEED FOR HEPATITIS C SCREENING TEST: ICD-10-CM

## 2022-11-07 DIAGNOSIS — R19.7 DIARRHEA, UNSPECIFIED TYPE: ICD-10-CM

## 2022-11-07 LAB
ALBUMIN SERPL-MCNC: 3.8 G/DL (ref 3.4–5)
ALP SERPL-CCNC: 115 U/L (ref 40–150)
ALT SERPL W P-5'-P-CCNC: 37 U/L (ref 0–50)
ANION GAP SERPL CALCULATED.3IONS-SCNC: 6 MMOL/L (ref 3–14)
AST SERPL W P-5'-P-CCNC: 55 U/L (ref 0–45)
BILIRUB SERPL-MCNC: 1 MG/DL (ref 0.2–1.3)
BUN SERPL-MCNC: 17 MG/DL (ref 7–30)
CALCIUM SERPL-MCNC: 9.4 MG/DL (ref 8.5–10.1)
CHLORIDE BLD-SCNC: 105 MMOL/L (ref 94–109)
CHOLEST SERPL-MCNC: 131 MG/DL
CO2 SERPL-SCNC: 29 MMOL/L (ref 20–32)
CREAT SERPL-MCNC: 0.86 MG/DL (ref 0.52–1.04)
ERYTHROCYTE [DISTWIDTH] IN BLOOD BY AUTOMATED COUNT: 13.4 % (ref 10–15)
FASTING STATUS PATIENT QL REPORTED: YES
GFR SERPL CREATININE-BSD FRML MDRD: 68 ML/MIN/1.73M2
GLUCOSE BLD-MCNC: 135 MG/DL (ref 70–99)
HBA1C MFR BLD: 6.3 % (ref 0–5.6)
HCT VFR BLD AUTO: 46 % (ref 35–47)
HDLC SERPL-MCNC: 33 MG/DL
HGB BLD-MCNC: 15.2 G/DL (ref 11.7–15.7)
LDLC SERPL CALC-MCNC: 76 MG/DL
MCH RBC QN AUTO: 30.6 PG (ref 26.5–33)
MCHC RBC AUTO-ENTMCNC: 33 G/DL (ref 31.5–36.5)
MCV RBC AUTO: 93 FL (ref 78–100)
NONHDLC SERPL-MCNC: 98 MG/DL
PLATELET # BLD AUTO: 176 10E3/UL (ref 150–450)
POTASSIUM BLD-SCNC: 4.6 MMOL/L (ref 3.4–5.3)
PROT SERPL-MCNC: 8.1 G/DL (ref 6.8–8.8)
RBC # BLD AUTO: 4.97 10E6/UL (ref 3.8–5.2)
SODIUM SERPL-SCNC: 140 MMOL/L (ref 133–144)
TRIGL SERPL-MCNC: 109 MG/DL
TSH SERPL DL<=0.005 MIU/L-ACNC: 0.76 MU/L (ref 0.4–4)
VIT B12 SERPL-MCNC: 713 PG/ML (ref 232–1245)
WBC # BLD AUTO: 4 10E3/UL (ref 4–11)

## 2022-11-07 PROCEDURE — 80061 LIPID PANEL: CPT | Performed by: INTERNAL MEDICINE

## 2022-11-07 PROCEDURE — 84443 ASSAY THYROID STIM HORMONE: CPT | Performed by: INTERNAL MEDICINE

## 2022-11-07 PROCEDURE — 80053 COMPREHEN METABOLIC PANEL: CPT | Performed by: INTERNAL MEDICINE

## 2022-11-07 PROCEDURE — 85027 COMPLETE CBC AUTOMATED: CPT | Performed by: INTERNAL MEDICINE

## 2022-11-07 PROCEDURE — 99215 OFFICE O/P EST HI 40 MIN: CPT | Performed by: INTERNAL MEDICINE

## 2022-11-07 PROCEDURE — 36415 COLL VENOUS BLD VENIPUNCTURE: CPT | Performed by: INTERNAL MEDICINE

## 2022-11-07 PROCEDURE — 86803 HEPATITIS C AB TEST: CPT | Performed by: INTERNAL MEDICINE

## 2022-11-07 PROCEDURE — 82607 VITAMIN B-12: CPT | Performed by: INTERNAL MEDICINE

## 2022-11-07 PROCEDURE — 83036 HEMOGLOBIN GLYCOSYLATED A1C: CPT | Performed by: INTERNAL MEDICINE

## 2022-11-07 RX ORDER — ONDANSETRON 8 MG/1
8 TABLET, FILM COATED ORAL EVERY 8 HOURS PRN
Qty: 20 TABLET | Refills: 0 | Status: SHIPPED | OUTPATIENT
Start: 2022-11-07 | End: 2023-01-17

## 2022-11-07 RX ORDER — NYSTATIN 100000 [USP'U]/G
POWDER TOPICAL
Qty: 60 G | Refills: 1 | Status: SHIPPED | OUTPATIENT
Start: 2022-11-07 | End: 2023-02-06

## 2022-11-07 ASSESSMENT — PAIN SCALES - GENERAL: PAINLEVEL: NO PAIN (0)

## 2022-11-07 NOTE — PROGRESS NOTES
Patient's instructions / PLAN:                                                        Plan:  1. Nystatin powder twice a day -- for the rash  2. Stool test for diarrhea   3. If negative for diarrhea -- do colonoscopy  4. Ondansetron 8 mg 3 times a day as needed for severe nausea   5.      ASSESSMENT & PLAN:                                                    (R21) Perineal rash in female  (primary encounter diagnosis)  Comment:   Plan: nystatin (MYCOSTATIN) 462089 UNIT/GM external         powder, Lipid panel reflex to direct LDL         Fasting, Hemoglobin A1c, CBC with platelets,         TSH with free T4 reflex, Albumin Random Urine         Quantitative with Creat Ratio, Comprehensive         metabolic panel, Vitamin B12, Hepatitis C         Screen Reflex to HCV RNA Quant and Genotype            (R19.7) Diarrhea, unspecified type  Comment:   Plan: C. difficile Toxin B PCR with reflex to C.         difficile Antigen and Toxins A/B EIA, Adult GI          Referral - Procedure Only, Lipid         panel reflex to direct LDL Fasting, Hemoglobin         A1c, CBC with platelets, TSH with free T4         reflex, Albumin Random Urine Quantitative with         Creat Ratio, Comprehensive metabolic panel,         Vitamin B12, Hepatitis C Screen Reflex to HCV         RNA Quant and Genotype            (K52.9) Chronic diarrhea  Comment:   Plan: Lipid panel reflex to direct LDL Fasting,         Hemoglobin A1c, CBC with platelets, TSH with         free T4 reflex, Albumin Random Urine         Quantitative with Creat Ratio, Comprehensive         metabolic panel, Vitamin B12, Hepatitis C         Screen Reflex to HCV RNA Quant and Genotype            (R11.2) Nausea and vomiting, unspecified vomiting type  Comment:   Plan: ondansetron (ZOFRAN) 8 MG tablet, Lipid panel         reflex to direct LDL Fasting, Hemoglobin A1c,         CBC with platelets, TSH with free T4 reflex,         Albumin Random Urine Quantitative with Creat          Ratio, Comprehensive metabolic panel, Vitamin         B12, Hepatitis C Screen Reflex to HCV RNA Quant        and Genotype            (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  (H)  Comment:   Plan: Lipid panel reflex to direct LDL Fasting,         Hemoglobin A1c, CBC with platelets, TSH with         free T4 reflex, Albumin Random Urine         Quantitative with Creat Ratio, Comprehensive         metabolic panel, Vitamin B12, Hepatitis C         Screen Reflex to HCV RNA Quant and Genotype            (N18.30) Stage 3 chronic kidney disease, unspecified whether stage 3a or 3b CKD (H)  Comment:   Plan: Lipid panel reflex to direct LDL Fasting,         Hemoglobin A1c, CBC with platelets, TSH with         free T4 reflex, Albumin Random Urine         Quantitative with Creat Ratio, Comprehensive         metabolic panel, Vitamin B12, Hepatitis C         Screen Reflex to HCV RNA Quant and Genotype            (R42) Dizziness  (M62.81) Generalized muscle weakness  Comment:   Plan: Forms for grab bar next to the toilet    (Z11.59) Encounter for hepatitis C screening test for low risk patient  Comment:   Plan: Lipid panel reflex to direct LDL Fasting,         Hemoglobin A1c, CBC with platelets, TSH with         free T4 reflex, Albumin Random Urine         Quantitative with Creat Ratio, Comprehensive         metabolic panel, Vitamin B12, Hepatitis C         Screen Reflex to HCV RNA Quant and Genotype            (Z11.59) Need for hepatitis C screening test  Comment:   Plan: Lipid panel reflex to direct LDL Fasting,         Hemoglobin A1c, CBC with platelets, TSH with         free T4 reflex, Albumin Random Urine         Quantitative with Creat Ratio, Comprehensive         metabolic panel, Vitamin B12, Hepatitis C         Screen Reflex to HCV RNA Quant and Genotype               Chief Complaint:                                                        Several things     SUBJECTIVE:                                                     History of present illness     I found this appointment very challenging.  She requested an urgent appointment to discuss about C. Difficile.  I had at her appointment after my working hours today.    She talks a lot, with a lot of stories and very few related with medical symptoms.  She told me stories about the building administration, the people who used to live in the apartment before her, about family and friends who want to visit her at the new apartment and so on.    I was able to summarize the following medical problems.      Accomodation request  -- she moved to a new apt where the toillet is very low. She can't get up from the toilet. She needs grab bars. She has papers from the apt building - I filled them out she has disability ( Med Prob: chr dizziness Chr LBP and gen muscles weakness) -- she uses walker         Feels like Crying all the time  -- she has appointment with [facility] -- Nov 28    C Diff and history of frequent urinary infections.   -- she was given recently prescriptions by dr Granados and dr Wilson -- she doesn't know the names  -- in Epic -- the last Abx are from June 2022.   -- neg C Diff on Oct 26    Skin rash  -- she c/o off discoloration in the pelvic area -- she thinks are related w the recent Abx   -- it looks like yeast/diaper rash from the front to bilat ingunal area and between buttocks     Stool incontinence   -- c/o of passing gas and unable to hold it  -- c/o of stool incontinence if diarrhea     GI, Hx of C Diff  -- c/o of explosive diarrhea after she eats  -- last colonoscopy -- 2015    ROS:                                                      ROS: negative for fever, chills, cough, wheezes, chest pain, shortness of breath,  abdominal pain, leg swelling   positive for nausea and vomiting, as above      OBJECTIVE:                                                    Physical Exam :    Blood pressure (!) 152/83, pulse 87, temperature (!) 95.8  F (35.4  C), temperature  "source Tympanic, resp. rate 18, height 1.727 m (5' 8\"), weight 113.4 kg (250 lb 1.6 oz), SpO2 98 %, not currently breastfeeding.   NAD, appears comfortable  Skin: as above     Neck: supple, no JVD, No thyroidmegaly. Lymph nodes nonpalpable cervical and supraclavicular.  Chest: clear to auscultation bilaterally, good respiratory effort  Heart: S1 S2, RRR, no mgr appreciated  Abdomen: soft, not tender,  Extremities: no edema,   Neurologic: A, Ox3, no focal signs appreciated    PMHx: reviewed  Past Medical History:   Diagnosis Date     Anemia     Iron Deficiency anemia     Atrial fibrillation (H)      CAD (coronary artery disease)     non-obstructive     Chronic pain     neck, low back, legs     Congestive heart failure (H)      Degenerative disk disease      Fibromyalgia      Gastro-oesophageal reflux disease      Gout      Hiatal hernia      Mumps      Neuropathy      CRISTIANA (obstructive sleep apnea) - CPAP      Palpitations      Pernicious anemia      Sleep apnea     uses CPAP.     Urinary incontinence      Vitamin D deficiency       PSHx: reviewed  Past Surgical History:   Procedure Laterality Date     APPENDECTOMY       CHOLECYSTECTOMY       COLONOSCOPY  3/15/2011     CORONARY ANGIOGRAPHY ADULT ORDER       CYSTOSCOPY, BIOPSY BLADDER, COMBINED N/A 7/13/2020    Procedure: CYSTOSCOPY, WITH BLADDER BIOPSY;  Surgeon: Deisy Wilson MD;  Location: [facility] OR     HEART CATH LEFT HEART CATH  12/30/16    medication management     HYSTERECTOMY TOTAL ABDOMINAL       Knee replacement NOS Left      LAPAROSCOPIC NISSEN FUNDOPLICATION N/A 2/4/2015    Procedure: LAPAROSCOPIC NISSEN FUNDOPLICATION;  Surgeon: Armando Ansari MD;  Location: [facility] OR     TONSILLECTOMY       TRANSPOSITION ULNAR NERVE (ELBOW)          Meds: reviewed  Current Outpatient Medications   Medication Sig Dispense Refill     ACE/ARB/ARNI NOT PRESCRIBED (INTENTIONAL) Please choose reason not prescribed, below       alcohol swab prep pads Use to swab area of " injection/chandrakant as directed. 100 each 3     balsalazide (COLAZAL) 750 MG capsule Take 2,250 mg by mouth 3 times daily       blood glucose (NO BRAND SPECIFIED) lancets standard Use to test blood sugar  as directed. 1 each 0     blood glucose (NO BRAND SPECIFIED) lancing device Device to be used with lancets. Patient requests Cooliriset 2 lancing device to check blood glucose once per day. 1 each 0     blood glucose calibration (NO BRAND SPECIFIED) solution Use to calibrate blood glucose monitor 1 Bottle 3     blood glucose monitoring (NO BRAND SPECIFIED) meter device kit Use to test blood sugar 1 times daily or as directed. 1 kit 1     blood glucose monitoring (NO BRAND SPECIFIED) meter device kit Use to test blood sugar 1 time daily 1 kit 0     butalbital-aspirin-caffeine (FIORINAL) -40 MG capsule TAKE 1 CAPSULE BY MOUTH EVERY 6 HOURS AS NEEDED FOR HEADACHE 30 capsule 0     cholecalciferol (VITAMIN D3) 5000 units (125 mcg) capsule Take 5,000 Units by mouth At Bedtime       EPINEPHrine (ANY BX GENERIC EQUIV) 0.3 MG/0.3ML injection 2-pack INJECT 0.3MLS (0.3MG) INTO THE MUSCLE ONCE AS NEEDED FOR ANAPHYLAXIS 2 each 1     estradiol (ESTRACE) 0.1 MG/GM vaginal cream Please use your finger to place a large blueberry size amount in the vagina nightly for two weeks and then three times a week at night thereafter 42.5 g 3     glipiZIDE (GLUCOTROL XL) 2.5 MG 24 hr tablet TAKE 1 TABLET TWICE A  tablet 3     meclizine (ANTIVERT) 12.5 MG tablet TAKE 1 TABLET BY MOUTH THREE TIMES DAILY AS NEEDED FOR DIZZINESS 30 tablet 1     mirabegron (MYRBETRIQ) 25 MG 24 hr tablet Take 1 tablet (25 mg) by mouth daily 30 tablet 3     nystatin (MYCOSTATIN) 074728 UNIT/GM external powder Apply topically 2 times daily 60 g 1     ONETOUCH ULTRA test strip USE 1 STRIP TO CHECK GLUCOSE ONCE DAILY 100 strip 11     warfarin ANTICOAGULANT (COUMADIN) 2.5 MG tablet TAKE 2.5 mg (2.5 mg x 1) every Tue, Thu, Sat; 3.75 mg (2.5 mg x 1.5) all  other days or as directed. 135 tablet 1       Soc Hx: reviewed  Fam Hx: reviewed      Chart documentation was completed, in part, with [employer] voice-recognition software. Even though reviewed, some grammatical, spelling, and word errors may remain.      45 minutes spent on the date of the encounter doing   chart review,   review of outside records,   review of test results,   interpretation of tests,   patient visit,   documentation,        Patti Lauren MD  Internal Medicine

## 2022-11-07 NOTE — LETTER
November 16, 2022      Savanna Rehman  [address]   [facility] [address]        Dear ,    We are writing to inform you of your test results.    Blood tests results are in acceptable Range       Resulted Orders   Lipid panel reflex to direct LDL Fasting   Result Value Ref Range    Cholesterol 131 <200 mg/dL    Triglycerides 109 <150 mg/dL    Direct Measure HDL 33 (L) >=50 mg/dL    LDL Cholesterol Calculated 76 <=100 mg/dL    Non HDL Cholesterol 98 <130 mg/dL    Patient Fasting > 8hrs? Yes     Narrative    Cholesterol  Desirable:  <200 mg/dL    Triglycerides  Normal:  Less than 150 mg/dL  Borderline High:  150-199 mg/dL  High:  200-499 mg/dL  Very High:  Greater than or equal to 500 mg/dL    Direct Measure HDL  Female:  Greater than or equal to 50 mg/dL   Male:  Greater than or equal to 40 mg/dL    LDL Cholesterol  Desirable:  <100mg/dL  Above Desirable:  100-129 mg/dL   Borderline High:  130-159 mg/dL   High:  160-189 mg/dL   Very High:  >= 190 mg/dL    Non HDL Cholesterol  Desirable:  130 mg/dL  Above Desirable:  130-159 mg/dL  Borderline High:  160-189 mg/dL  High:  190-219 mg/dL  Very High:  Greater than or equal to 220 mg/dL   Hemoglobin A1c   Result Value Ref Range    Hemoglobin A1C 6.3 (H) 0.0 - 5.6 %      Comment:      Normal <5.7%   Prediabetes 5.7-6.4%    Diabetes 6.5% or higher     Note: Adopted from ADA consensus guidelines.   CBC with platelets   Result Value Ref Range    WBC Count 4.0 4.0 - 11.0 10e3/uL    RBC Count 4.97 3.80 - 5.20 10e6/uL    Hemoglobin 15.2 11.7 - 15.7 g/dL    Hematocrit 46.0 35.0 - 47.0 %    MCV 93 78 - 100 fL    MCH 30.6 26.5 - 33.0 pg    MCHC 33.0 31.5 - 36.5 g/dL    RDW 13.4 10.0 - 15.0 %    Platelet Count 176 150 - 450 10e3/uL   TSH with free T4 reflex   Result Value Ref Range    TSH 0.76 0.40 - 4.00 mU/L   Comprehensive metabolic panel   Result Value Ref Range    Sodium 140 133 - 144 mmol/L    Potassium 4.6 3.4 - 5.3 mmol/L    Chloride 105 94 - 109  mmol/L    Carbon Dioxide (CO2) 29 20 - 32 mmol/L    Anion Gap 6 3 - 14 mmol/L    Urea Nitrogen 17 7 - 30 mg/dL    Creatinine 0.86 0.52 - 1.04 mg/dL    Calcium 9.4 8.5 - 10.1 mg/dL    Glucose 135 (H) 70 - 99 mg/dL    Alkaline Phosphatase 115 40 - 150 U/L    AST 55 (H) 0 - 45 U/L    ALT 37 0 - 50 U/L    Protein Total 8.1 6.8 - 8.8 g/dL    Albumin 3.8 3.4 - 5.0 g/dL    Bilirubin Total 1.0 0.2 - 1.3 mg/dL    GFR Estimate 68 >60 mL/min/1.73m2      Comment:      Effective December 21, 2021 eGFRcr in adults is calculated using the 2021 CKD-EPI creatinine equation which includes age and gender (Naveen et al., NEJM, DOI: 10.1056/SQNDce3582563)   Vitamin B12   Result Value Ref Range    Vitamin B12 713 232 - 1,245 pg/mL   Hepatitis C Screen Reflex to HCV RNA Quant and Genotype   Result Value Ref Range    Hepatitis C Antibody Nonreactive Nonreactive    Narrative    Assay performance characteristics have not been established for newborns, infants, and children.       If you have any questions or concerns, please call the clinic at the number listed above.       Sincerely,      Patti Suárez MD

## 2022-11-07 NOTE — PATIENT INSTRUCTIONS
Plan:  1. Nystatin powder twice a day -- for the rash  2. Stool test for diarrhea   3. If negative for diarrhea -- do colonoscopy  4. Ondansetron 8 mg 3 times a day as needed for severe nausea

## 2022-11-08 LAB — HCV AB SERPL QL IA: NONREACTIVE

## 2022-11-09 ENCOUNTER — DOCUMENTATION ONLY (OUTPATIENT)
Dept: ANTICOAGULATION | Facility: CLINIC | Age: 82
End: 2022-11-09

## 2022-11-09 DIAGNOSIS — I48.20 CHRONIC ATRIAL FIBRILLATION (H): ICD-10-CM

## 2022-11-09 DIAGNOSIS — Z79.01 LONG TERM (CURRENT) USE OF ANTICOAGULANTS: ICD-10-CM

## 2022-11-09 DIAGNOSIS — I48.21 PERMANENT ATRIAL FIBRILLATION (H): Primary | ICD-10-CM

## 2022-11-09 NOTE — PROGRESS NOTES
ANTICOAGULATION     Savanna Rehman is overdue for INR check.      Called Fannie, [phone], home care nurse to discuss INR. Patient has cancelled appt for this week and will be seen on 11/15/22. Patient reports family has COVID and she doesn't want nurse to come due to this. Home care  Denies any changes or COVID symptoms, patient has been stable the 2 checks, VO given to change recheck date to 11/15/22 and to resume maintenance until next check. Advised if home care to have patient assess for any bleeding s/s as COVID can cause INR to rise especially if symptomatic. Home care reports so far no symptoms for patient.    Iris Kemp RN

## 2022-11-15 ENCOUNTER — ANTICOAGULATION THERAPY VISIT (OUTPATIENT)
Dept: ANTICOAGULATION | Facility: CLINIC | Age: 82
End: 2022-11-15

## 2022-11-15 DIAGNOSIS — Z79.01 LONG TERM (CURRENT) USE OF ANTICOAGULANTS: ICD-10-CM

## 2022-11-15 DIAGNOSIS — I48.20 CHRONIC ATRIAL FIBRILLATION (H): ICD-10-CM

## 2022-11-15 DIAGNOSIS — I48.21 PERMANENT ATRIAL FIBRILLATION (H): Primary | ICD-10-CM

## 2022-11-15 LAB — INR (EXTERNAL): 4.5 (ref 0.9–1.1)

## 2022-11-15 NOTE — PROGRESS NOTES
ANTICOAGULATION MANAGEMENT     Savanna Rehman 82 year old female is on warfarin with supratherapeutic INR result. (Goal INR 2.0-3.0)    Recent labs: (last 7 days)     11/15/22  1302   INR 4.5*       ASSESSMENT       Source(s): Chart Review and Home Care/Facility Nurse       Warfarin doses taken: Warfarin taken as instructed    Diet: No new diet changes identified    New illness, injury, or hospitalization: Chronic diarrhea, though slightly improved this past week.    Medication/supplement changes: ~1,000mg tylenol/day for the last two weeks for back and neck pain.    Signs or symptoms of bleeding or clotting: No    Previous INR: Therapeutic last 2(+) visits    Additional findings: Moved first week of November       PLAN     Recommended plan for temporary change(s) and ongoing change(s) affecting INR     Dosing Instructions: hold dose then decrease your warfarin dose (11% change) with next INR in 1 week       Summary  As of 11/15/2022    Full warfarin instructions:  11/15: Hold; Otherwise 3.75 mg every Mon, Fri; 2.5 mg all other days; Starting 11/15/2022   Next INR check:  11/22/2022             Telephone call with Evelin home care nurse who verbalizes understanding and agrees to plan    Orders given to  Homecare nurse/facility to recheck    Education provided:     Goal range and lab monitoring: goal range and significance of current result    Plan made per [facility] anticoagulation protocol    Miko West RN  Anticoagulation Clinic  11/15/2022    _______________________________________________________________________     Anticoagulation Episode Summary     Current INR goal:  2.0-3.0   TTR:  72.8 % (1 y)   Target end date:  Indefinite   Send INR reminders to:  NIKKI CHASE    Indications    Permanent atrial fibrillation (H) [I48.21]  Chronic atrial fibrillation (H) [I48.20]  Long term (current) use of anticoagulants [Z79.01]           Comments:  Home care          Anticoagulation Care Providers     Provider Role  Specialty Phone number    Patti Suárez MD Referring Internal Medicine [phone]

## 2022-11-16 ENCOUNTER — VIRTUAL VISIT (OUTPATIENT)
Dept: INTERNAL MEDICINE | Facility: CLINIC | Age: 82
End: 2022-11-16
Payer: COMMERCIAL

## 2022-11-16 DIAGNOSIS — F41.9 ANXIETY: Primary | ICD-10-CM

## 2022-11-16 PROCEDURE — 96127 BRIEF EMOTIONAL/BEHAV ASSMT: CPT | Performed by: INTERNAL MEDICINE

## 2022-11-16 PROCEDURE — 99214 OFFICE O/P EST MOD 30 MIN: CPT | Mod: 95 | Performed by: INTERNAL MEDICINE

## 2022-11-16 RX ORDER — SERTRALINE HYDROCHLORIDE 25 MG/1
25 TABLET, FILM COATED ORAL DAILY
Qty: 7 TABLET | Refills: 0 | Status: SHIPPED | OUTPATIENT
Start: 2022-11-16 | End: 2023-01-17

## 2022-11-16 ASSESSMENT — PATIENT HEALTH QUESTIONNAIRE - PHQ9
SUM OF ALL RESPONSES TO PHQ QUESTIONS 1-9: 10
5. POOR APPETITE OR OVEREATING: SEVERAL DAYS

## 2022-11-16 ASSESSMENT — ANXIETY QUESTIONNAIRES
IF YOU CHECKED OFF ANY PROBLEMS ON THIS QUESTIONNAIRE, HOW DIFFICULT HAVE THESE PROBLEMS MADE IT FOR YOU TO DO YOUR WORK, TAKE CARE OF THINGS AT HOME, OR GET ALONG WITH OTHER PEOPLE: VERY DIFFICULT
3. WORRYING TOO MUCH ABOUT DIFFERENT THINGS: MORE THAN HALF THE DAYS
GAD7 TOTAL SCORE: 12
5. BEING SO RESTLESS THAT IT IS HARD TO SIT STILL: SEVERAL DAYS
2. NOT BEING ABLE TO STOP OR CONTROL WORRYING: MORE THAN HALF THE DAYS
1. FEELING NERVOUS, ANXIOUS, OR ON EDGE: NEARLY EVERY DAY
6. BECOMING EASILY ANNOYED OR IRRITABLE: MORE THAN HALF THE DAYS
GAD7 TOTAL SCORE: 12
7. FEELING AFRAID AS IF SOMETHING AWFUL MIGHT HAPPEN: SEVERAL DAYS

## 2022-11-16 ASSESSMENT — ENCOUNTER SYMPTOMS
CARDIOVASCULAR NEGATIVE: 1
SLEEP DISTURBANCE: 1
NERVOUS/ANXIOUS: 1
GASTROINTESTINAL NEGATIVE: 1
RESPIRATORY NEGATIVE: 1
FATIGUE: 1
APPETITE CHANGE: 1
NEUROLOGICAL NEGATIVE: 1

## 2022-11-16 NOTE — PROGRESS NOTES
"Savanna is a 82 year old who is being evaluated via a billable telephone visit.       What phone number would you like to be contacted at? [phone]  How would you like to obtain your AVS? Mail a copy    Assessment & Plan     Anxiety  At this time, patient did complete a PHQ-9 as well as a LAURIE-7 score sheet.  Her PHQ-9 score was 10, and her LAURIE-7 was noted to be 12.  Patient does report significant difficulties with anxiety.  After much discussion, we did elect to proceed with a trial of medication to help assist with her anxiety.  After discussing medication options, patient was agreeable to proceed with sertraline.  She will be placed on sertraline 25 mg daily for 1 week, and her dose will then be increased to 50 mg daily.  Side effects of SSRI use were discussed.  We will have the patient follow-up in the clinic in approximately 4 to 6 weeks for repeat assessment.  Patient has established with a psychologist at an outside location to assist with therapy sessions.  Patient is aware that she can contact the clinic sooner should she have any issues with worsening of her mood or the medication.  - sertraline (ZOLOFT) 25 MG tablet; Take 1 tablet (25 mg) by mouth daily Take prior to starting 50 mg dose.  - sertraline (ZOLOFT) 50 MG tablet; Take 1 tablet (50 mg) by mouth daily    Prescription drug management       BMI:   Estimated body mass index is 38.03 kg/m  as calculated from the following:    Height as of 11/7/22: 1.727 m (5' 8\").    Weight as of 11/7/22: 113.4 kg (250 lb 1.6 oz).       Depression Screening Follow Up    PHQ 11/16/2022   PHQ-9 Total Score 10   Q9: Thoughts of better off dead/self-harm past 2 weeks Not at all       See Patient Instructions    Return in about 4 weeks (around 12/14/2022) for Follow up anxiety.    Alberto Perkins MD  [facility]    Subjective   Savanna is a 82 year old, presenting for the following health issues:  Recheck Medication    Depression " concerns    Patient is an 82-year-old female who presents in a virtual visit to discuss anxiety.  She reports that for the last 4 months she has been having difficulty with increased anxiety.  Patient states that she has had a lot of stressors in her life.  She did recently move into a new apartment, and she has had difficulties with her mood.  Patient states that she is still in the process of unpacking.  She also has had several health concerns that she has been dressing with her regular primary care physician.  Patient does feel that she is in a constant state of worry.  She gets very upset over the slightest provocation.  Patient did state that she had some difficulty opening one of the doors in her apartment a few days prior to presentation, and she reports she became extremely emotional and upset over this.  Patient does report that she has previously had issues with anxiety several years ago, and she had been on a medication to help manage her mood.  She cannot recall what she had taken previously.  Patient does report that she has had difficulties with sleeping as she feels that she is in a constant state of worry.  She does feel that she is eating less due to her anxiety.  Patient does note that her family members have told her that they are concerned about her increased anxiety.  She would like to start a medication at this time.       Abnormal Mood Symptoms  Onset/Duration: 3 to 4 months ago  Description: Increased stress  Depression (if yes, do PHQ-9): YES  Anxiety (if yes, do LAURIE-7): YES  Accompanying Signs & Symptoms:  Still participating in activities that you used to enjoy: YES  Fatigue: YES  Irritability: YES  Difficulty concentrating: YES  Changes in appetite: No  Problems with sleep: YES  Heart racing/beating fast: No  Abnormally elevated, expansive, or irritable mood: N/A  Persistently increased activity or energy: No  Thoughts of hurting yourself or others: No  History:  Recent stress or major  life event: YES  Prior depression or anxiety: many years ago  Family history of depression or anxiety: YES  Alcohol/drug use: No  Difficulty sleeping: YES  Precipitating or alleviating factors: None  Therapies tried and outcome: none  PHQ 11/30/2020 2/17/2021 11/16/2022   PHQ-9 Total Score 10 4 10   Q9: Thoughts of better off dead/self-harm past 2 weeks Not at all Not at all Not at all     LAURIE-7 SCORE 11/16/2022   Total Score 12         Review of Systems   Constitutional: Positive for appetite change and fatigue.   HENT: Negative.    Respiratory: Negative.    Cardiovascular: Negative.    Gastrointestinal: Negative.    Genitourinary: Negative.    Neurological: Negative.    Psychiatric/Behavioral: Positive for mood changes and sleep disturbance. Negative for self-injury and suicidal ideas. The patient is nervous/anxious.             Objective           Vitals:  No vitals were obtained today due to virtual visit.    Physical Exam   healthy, alert and no distress  PSYCH: Alert and oriented times 3; coherent speech, normal   rate and volume, able to articulate logical thoughts, able   to abstract reason, no tangential thoughts, no hallucinations   or delusions  Her affect is normal  RESP: No cough, no audible wheezing, able to talk in full sentences  Remainder of exam unable to be completed due to telephone visits                Phone call duration: 22 minutes

## 2022-11-17 ENCOUNTER — TELEPHONE (OUTPATIENT)
Dept: INTERNAL MEDICINE | Facility: CLINIC | Age: 82
End: 2022-11-17

## 2022-11-17 NOTE — TELEPHONE ENCOUNTER
HOME HEALTH CERTIFICATION 11/17-1/15; received via fax. Orders/Forms in your mailbox to be signed.

## 2022-11-18 ENCOUNTER — VIRTUAL VISIT (OUTPATIENT)
Dept: PSYCHOLOGY | Facility: CLINIC | Age: 82
End: 2022-11-18
Attending: INTERNAL MEDICINE
Payer: COMMERCIAL

## 2022-11-18 DIAGNOSIS — F41.9 ANXIETY: ICD-10-CM

## 2022-11-18 PROCEDURE — 90834 PSYTX W PT 45 MINUTES: CPT | Mod: 95

## 2022-11-18 ASSESSMENT — COLUMBIA-SUICIDE SEVERITY RATING SCALE - C-SSRS
2. HAVE YOU ACTUALLY HAD ANY THOUGHTS OF KILLING YOURSELF?: NO
1. IN THE PAST MONTH, HAVE YOU WISHED YOU WERE DEAD OR WISHED YOU COULD GO TO SLEEP AND NOT WAKE UP?: NO
ATTEMPT LIFETIME: NO
1. HAVE YOU WISHED YOU WERE DEAD OR WISHED YOU COULD GO TO SLEEP AND NOT WAKE UP?: YES

## 2022-11-18 NOTE — PROGRESS NOTES
"    [facility]   Mental Health & Addiction Services     Progress Note - Initial Visit    Patient  Name:  Savanna Rehman Date: 22           Service Type: Individual     Visit Start Time: 11:00am  Visit End Time: 11:50am    Visit #: 1    Attendees: Client attended alone    Service Modality:  Phone Visit:      Provider verified identity through the following two step process.  Patient provided:  Patient  and Patient address    The patient has been notified of the following:      \"We have found that certain health care needs can be provided without the need for a face to face visit.  This service lets us provide the care you need with a phone conversation.       I will have full access to your [facility] medical record during this entire phone call.   I will be taking notes for your medical record.      Since this is like an office visit, we will bill your insurance company for this service.       There are potential benefits and risks of telephone visits (e.g. limits to patient confidentiality) that differ from in-person visits.?Confidentiality still applies for telephone services, and nobody will record the visit.  It is important to be in a quiet, private space that is free of distractions (including cell phone or other devices) during the visit.??      If during the course of the call I believe a telephone visit is not appropriate, you will not be charged for this service\"     Consent has been obtained for this service by care team member: Yes        DATA:   Interactive Complexity: No   Crisis: No     Presenting Concerns/  Current Stressors:   Pt shared that she is feeling hopeful about beginning therapy and medication for her anxiety. She shared that both her daughter and best friend have commented that it is hard for them to be around her because of her anxiety. She reports she is motivated to work to reduce her anxiety. She shared that grieving over her fathers death, friends " death and her concerns about the state of the world and her hatred towards Harshil Cole are what cause her the most anxiety. She shared that she moved to a new apartment at the beginning of November and is hoping that she will be happier at this location. She shared that she plans to get involved in some of the activities at the apartment as it is a 55+ senior living facility and they have acitivities for residents. She shared that she has a home health nurse who comes to her home once per week to help set up her medications, which she finds helpful. She is working with a  to try to get connected with a meal delivery service and transportation service that can help her get to her appointments more easily.       ASSESSMENT:  Mental Status Assessment:  Appearance:   Could not assess- phone visit, pt reports she is overweight   Eye Contact:   Could not assess- phone visit    Psychomotor Behavior: Could not assess- phone visit    Attitude:   Friendly  Orientation:   All  Speech   Rate / Production: Talkative   Volume:  Normal   Mood:    Anxious   Affect:    Tearful  Thought Content:  Clear   Thought Form:  Coherent   Insight:    Fair       Safety Issues and Plan for Safety and Risk Management:     H[place]ricks Suicide Severity Rating Scale (Lifetime/Recent)  H[place]ricks Suicide Severity Rating (Lifetime/Recent) 11/18/2022   1. Wish to be Dead (Lifetime) 1   1. Wish to be Dead (Past 1 Month) 0   2. Non-Specific Active Suicidal Thoughts (Lifetime) 0   Actual Attempt (Lifetime) 0   Has subject engaged in non-suicidal self-injurious behavior? (Lifetime) 0   Calculated C-SSRS Risk Score (Lifetime/Recent) No Risk Indicated     Patient denies current fears or concerns for personal safety.  Patient denies current or recent suicidal ideation or behaviors.  Patient denies current or recent homicidal ideation or behaviors.  Patient denies current or recent self injurious behavior or ideation.  Patient denies other safety  concerns.  Recommended that patient call 911 or go to the local ED should there be a change in any of these risk factors.  Patient reports there are no firearms in the house.     Diagnostic Criteria:  Adjustment Disorder  A. The development of emotional or behavioral symptoms in response to an identifiable stressor(s) occurring within 3 months of the onset of the stressor(s)  B. These symptoms or behaviors are clinically significant, as evidenced by one or both of the following:       - Marked distress that is out of proportion to the severity/intensity of the stressor (with consideration for external context & culture)       - Significant impairment in social, occupational, or other important areas of functioning  C. The stress-related disturbance does not meet criteria for another disorder & is not not an exacerbation of another mental disorder  D. The symptoms do not represent normal bereavement  E. Once the stressor or its consequences have terminated, the symptoms do not persist for more than an additional 6 months       * Adjustment Disorder with Anxiety: The predominant manfestations are symptoms such as nervousness, worry, or jitteriness, or, in children separation anxiety from major attachment figures      DSM5 Diagnoses: (Sustained by DSM5 Criteria Listed Above)  Diagnoses: Adjustment Disorders  309.24 (F43.22) With anxiety  Psychosocial & Contextual Factors: recently moved into [facility], [place] [facility]+ living facility  WHODAS 2.0 (12 item): No flowsheet data found.  Intervention:   Psychodynamic- Patient processed internal experiences  and Completed through review of safety issues and safety interventions  Collateral Reports Completed:  Not Applicable      PLAN: (Homework, other):  1. Provider will continue Diagnostic Assessment.  Patient was given the following to do until next session:  Write down topics/questions to discuss next session.    2. Provider recommended the following referrals: NA     3.  Suicide Risk and  Safety Concerns were assessed for Savanna Rehman.    Patient meets the following risk assessment and triage: Patient denied any current/recent/lifetime history of suicidal ideation and/or behaviors.  No safety plan indicated at this time.       SIENA Aparicio  November 18, 2022      This note has been reviewed and I agree with the plan of care. This note is co-signed by ANA LUISA Bird, [facility], Supervisor, on: November 28, 2022

## 2022-11-21 DIAGNOSIS — Z53.9 DIAGNOSIS NOT YET DEFINED: Primary | ICD-10-CM

## 2022-11-21 PROCEDURE — G0179 MD RECERTIFICATION HHA PT: HCPCS | Performed by: INTERNAL MEDICINE

## 2022-11-22 ENCOUNTER — ANTICOAGULATION THERAPY VISIT (OUTPATIENT)
Dept: ANTICOAGULATION | Facility: CLINIC | Age: 82
End: 2022-11-22

## 2022-11-22 DIAGNOSIS — Z79.01 LONG TERM (CURRENT) USE OF ANTICOAGULANTS: ICD-10-CM

## 2022-11-22 DIAGNOSIS — I48.21 PERMANENT ATRIAL FIBRILLATION (H): Primary | ICD-10-CM

## 2022-11-22 DIAGNOSIS — I48.20 CHRONIC ATRIAL FIBRILLATION (H): ICD-10-CM

## 2022-11-22 LAB — INR (EXTERNAL): 6 (ref 0.9–1.1)

## 2022-11-23 ENCOUNTER — TELEPHONE (OUTPATIENT)
Dept: NURSING | Facility: CLINIC | Age: 82
End: 2022-11-23

## 2022-11-23 ENCOUNTER — NURSE TRIAGE (OUTPATIENT)
Dept: NURSING | Facility: CLINIC | Age: 82
End: 2022-11-23

## 2022-11-23 ENCOUNTER — LAB (OUTPATIENT)
Dept: LAB | Facility: CLINIC | Age: 82
End: 2022-11-23
Payer: COMMERCIAL

## 2022-11-23 ENCOUNTER — ANTICOAGULATION THERAPY VISIT (OUTPATIENT)
Dept: ANTICOAGULATION | Facility: CLINIC | Age: 82
End: 2022-11-23

## 2022-11-23 DIAGNOSIS — I48.20 CHRONIC ATRIAL FIBRILLATION (H): ICD-10-CM

## 2022-11-23 DIAGNOSIS — I48.21 PERMANENT ATRIAL FIBRILLATION (H): Primary | ICD-10-CM

## 2022-11-23 DIAGNOSIS — Z79.01 LONG TERM (CURRENT) USE OF ANTICOAGULANTS: ICD-10-CM

## 2022-11-23 DIAGNOSIS — I48.21 PERMANENT ATRIAL FIBRILLATION (H): ICD-10-CM

## 2022-11-23 LAB — INR (EXTERNAL): 6.2 (ref 0.9–1.1)

## 2022-11-23 PROCEDURE — 36415 COLL VENOUS BLD VENIPUNCTURE: CPT

## 2022-11-23 NOTE — PROGRESS NOTES
Venous result not yet available at 1700. Called and left a DVM for CLIFFORD Anthony, as per our previous plan Savanna should hold warfarin x 2 and we will contact Raj with results on Friday 11/25/22 to discuss a plan further.

## 2022-11-23 NOTE — PROGRESS NOTES
ANTICOAGULATION MANAGEMENT     Savanna Rehman 82 year old female is on warfarin with supratherapeutic INR result. (Goal INR 2.0-3.0)    Recent labs: (last 7 days)     11/23/22  1156   INR 6.2*       ASSESSMENT       Source(s): Chart Review and Home Care/Facility Nurse       Warfarin doses taken: Warfarin taken as instructed and held yesterday as instructed    Diet: No new diet changes identified    New illness, injury, or hospitalization: No    Medication/supplement changes: None noted    Signs or symptoms of bleeding or clotting: No    Previous INR: Supratherapeutic, no change since yesterday. Home care is unable to draw a venous INR.     Additional findings: No significant changes in the last few weeks to explain increase in INR       PLAN     Recommended plan for no diet, medication or health factor changes affecting INR     Dosing Instructions: Plan was made yesterday to reduce maintenance 12% and recheck INR today d/t the upcoming holiday. As INR has not changed at all since yesterday ACN recommended a venous confirmation if possible today. Home care is unable to draw a venous. Patient is able to get to the lab this afternoon for a STAT venous draw. Plan made with CLIFFORD Anthony; ACN will contact her later today with final results and dosing plan/recheck date. If for some reason INR does not result by the end of business hours today Savanna was instructed to hold warfarin at this time and we will contact Raj on Friday 11/25 when we are back in clinic.       Summary  As of 11/23/2022    Full warfarin instructions:  11/23: Hold; 11/24: Hold; Otherwise 2.5 mg every day; Starting 11/23/2022   Next INR check:  11/25/2022             Telephone call with Raj home care nurse who verbalizes understanding and agrees to plan    Lab visit scheduled    Education provided:     Goal range and lab monitoring: goal range and significance of current result    Symptom monitoring: monitoring for bleeding signs and symptoms,  when to seek medical attention/emergency care and if you hit your head or have a bad fall seek emergency care    Plan made per [facility] anticoagulation protocol    Sahra Ferrari RN  Anticoagulation Clinic  11/23/2022    _______________________________________________________________________     Anticoagulation Episode Summary     Current INR goal:  2.0-3.0   TTR:  70.6 % (1 y)   Target end date:  Indefinite   Send INR reminders to:  [facility]    Indications    Permanent atrial fibrillation (H) [I48.21]  Chronic atrial fibrillation (H) [I48.20]  Long term (current) use of anticoagulants [Z79.01]           Comments:  Home care          Anticoagulation Care Providers     Provider Role Specialty Phone number    Patti Suárez MD Referring Internal Medicine [phone]

## 2022-11-24 ENCOUNTER — HOSPITAL ENCOUNTER (EMERGENCY)
Facility: CLINIC | Age: 82
Discharge: HOME | End: 2022-11-24
Attending: EMERGENCY MEDICINE | Admitting: EMERGENCY MEDICINE
Payer: COMMERCIAL

## 2022-11-24 ENCOUNTER — NURSE TRIAGE (OUTPATIENT)
Dept: NURSING | Facility: CLINIC | Age: 82
End: 2022-11-24

## 2022-11-24 VITALS
TEMPERATURE: 97.5 F | HEART RATE: 70 BPM | RESPIRATION RATE: 20 BRPM | DIASTOLIC BLOOD PRESSURE: 88 MMHG | OXYGEN SATURATION: 93 % | SYSTOLIC BLOOD PRESSURE: 183 MMHG

## 2022-11-24 DIAGNOSIS — R79.1 SUPRATHERAPEUTIC INR: ICD-10-CM

## 2022-11-24 LAB — INR PPP: 3.54 (ref 0.85–1.15)

## 2022-11-24 PROCEDURE — 85610 PROTHROMBIN TIME: CPT | Performed by: EMERGENCY MEDICINE

## 2022-11-24 PROCEDURE — 99283 EMERGENCY DEPT VISIT LOW MDM: CPT

## 2022-11-24 PROCEDURE — 36415 COLL VENOUS BLD VENIPUNCTURE: CPT | Performed by: EMERGENCY MEDICINE

## 2022-11-24 ASSESSMENT — ENCOUNTER SYMPTOMS
HEMATURIA: 0
COUGH: 0
DIZZINESS: 1
BRUISES/BLEEDS EASILY: 1
BLOOD IN STOOL: 0

## 2022-11-24 NOTE — TELEPHONE ENCOUNTER
Calling for INR results. Lab is still in process. Very anxious because most recent INR was 6.3, has multiple hematomas d/t coagulopathy. Patient reassured that her lab values were important.    Yi Jiménez RN on 11/24/2022 at 7:44 AM    Reason for Disposition    Caller requesting routine or non-urgent lab result    Additional Information    Negative: Lab calling with strep throat test results and triager can call in prescription    Negative: Lab calling with urinalysis test results and triager can call in prescription    Negative: Medication questions    Negative: Medication renewal and refill questions    Negative: Pre-operative or pre-procedural questions    Negative: ED call to PCP (i.e., primary care provider; doctor, NP, or PA)    Negative: Doctor (or NP/PA) call to PCP    Negative: Call about patient who is currently hospitalized    Negative: Lab or radiology calling with CRITICAL test results    Negative: [1] Follow-up call from patient regarding patient's clinical status AND [2] information urgent    Negative: [1] Caller requests to speak ONLY to PCP AND [2] URGENT question    Negative: [1] Caller requests to speak to PCP now AND [2] won't tell us reason for call  (Exception: If 10 pm to 6 am, caller must first discuss reason for the call.)    Negative: Notification of hospital admission    Negative: Notification of death    Negative: Caller requesting lab results  (Exception: Routine or non-urgent lab result.)    Negative: Lab or radiology calling with test results    Negative: [1] Follow-up call from patient regarding patient's clinical status AND [2] information NON-URGENT    Negative: [1] Caller requests to speak ONLY to PCP AND [2] NON-URGENT question    Negative: Caller requesting an appointment, triage offered and declined    Protocols used: PCP CALL - NO TRIAGE-A-

## 2022-11-24 NOTE — ED TRIAGE NOTES
Pt arrives with c/o elevated INR. Pt reports the lab never sent out her specimen earlier this week and she was advised by the nurse line to come to the ER. Pt endorses more bruising.     Triage Assessment     Row Name 11/24/22 0955       Triage Assessment (Adult)    Airway WDL WDL       Respiratory WDL    Respiratory WDL WDL       Skin Circulation/Temperature WDL    Skin Circulation/Temperature WDL WDL       Cardiac WDL    Cardiac WDL WDL       Peripheral/Neurovascular WDL    Peripheral Neurovascular WDL WDL

## 2022-11-24 NOTE — TELEPHONE ENCOUNTER
Patient is calling in again for INR results.    Progress Notes  Sahra Ferrari, RN (Registered Nurse)  Unsigned  Venous result not yet available at 1700. Called and left a DVM for CLIFFORD Anthony, as per our previous plan Savanna should hold warfarin x 2 and we will contact Raj with results on Friday 11/25/22 to discuss a plan further.         Reinforced with patient the above note from the anticoagulant clinic.      Patient also states that she is eating brussel sprouts and sharlene.      Patient will call back in the morning.      Roya Orozco RN on 11/23/2022 at 10:56 PM

## 2022-11-24 NOTE — ED PROVIDER NOTES
History   Chief Complaint:  Abnormal Labs       The history is provided by the patient.      Savanna Rehman is a 82 year old female anticoagulated on warfarin with history of type II diabetes, hyperlipidemia, and chronic atrial fibrillation who presents with abnormal labs. Savanna states that for the past few days her INR has been trending upwards, with it increasing from 4 to 6.3 this morning and because of this her PCA told her to come in. During evaluation, Savanna notes that she has some increased bruising and chronic dizziness, but otherwise feels well. She denies any hemoptysis, hematuria, or bloody stools. Of note, Savanna stopped taking her warfarin two days ago.     Review of Systems   Respiratory: Negative for cough (hemoptysis).    Gastrointestinal: Negative for blood in stool.   Genitourinary: Negative for hematuria.   Neurological: Positive for dizziness (chronic).   Hematological: Bruises/bleeds easily.   All other systems reviewed and are negative.    Allergies:  Aspirin  Bacitracin  Bactrim  Betamethasone  Codeine  Propoxyphene  Hydromorphone  Levofloxacin  Neomycin  Hydrocortisone  Nitrofurantoin  Adhesives  Oxycodone  Petrolatum  Tramadol  Vicodin  Xarelto  Percodan    Medications:  Fiorinal  Cholecalciferol  Glipizide  Warfarin  Meclizine  Myrbetriq  Zofran  Sertraline    Past Medical History:     Anemia  GERD  Gout  Type II diabetes mellitus  Hyperlipidemia  Recurrent UTI  Obesity  Angioedema  Chronic atrial fibrillation  CRISTIANA  Vitamin D deficiency  Ascending aorta dilatation  Non-rheumatic tricuspid valve regurgitation  Stage 3 CKD  C. diff infection     Past Surgical History:    Appendectomy  Cholecystectomy  Colonoscopy  Coronary angiography  Cystoscopy  Left heart catheterization  DAJUAN  Left TKR  Laparoscopic nissen fundoplication  Tonsillectomy  Ulnar nerve transposition     Family History:    Alzheimer's disease - mother  Lung cancer - father    Social History:  Patient came from  home.  Patient has a PCA that visit.  Patient is unaccompanied in the ED.  PCP: Patti Suárez     Physical Exam     Patient Vitals for the past 24 hrs:   BP Temp Temp src Pulse Resp SpO2   22 0954 (!) 183/88 97.5  F (36.4  C) Temporal 70 20 93 %       Physical Exam      Eyes:    Conjunctiva normal  Neck:    Supple, no meningismus.     CV:     Regular rate and rhythm.      No murmurs, rubs or gallops.    PULM:    Clear to auscultation bilateral.       No respiratory distress.      Good air exchange.  ABD:    Soft, non-tender, non-distended.       No rebound, guarding or rigidity.  MSK:     No gross deformity to all four extremities.   LYMPH:   No cervical lymphadenopathy.  NEURO:   Alert, good muscular tone, no atrophy.   Skin:    Warm, dry      Few scattered areas of ecchymosis to the upper extremities  Psych:    Mood is good and affect is appropriate.      Emergency Department Course     Laboratory:  INR 3.54    Emergency Department Course:    Reviewed:  I reviewed nursing notes, vitals, past medical history and Care Everywhere    Assessments:  1012 I obtained history and examined the patient as noted above. At this time, the patient was deemed safe to return home, and she agreed to the plan of care.    Disposition:  The patient was discharged to home.     Impression & Plan     Medical Decision Makin-year-old female presents to the ED with supratherapeutic INR.  INR yesterday was 6.0 but has stopped her warfarin 2 days prior.  She takes warfarin for atrial fibrillation.  She has no clinical signs of bleeding other than mild ecchymosis to the upper extremities.  No indication for acute reversal.  INR today is 3.54.  Patient to follow-up with her INR clinic and primary care physician for further direction regarding her warfarin dosing.  Return to ED for any worsening symptoms.    Diagnosis:    ICD-10-CM    1. Supratherapeutic INR  R79.1           Discharge Medications:  Discharge  Medication List as of 11/24/2022 10:27 AM          Scribe Disclosure:  I, Sheryl Vanegas, am serving as a scribe at 10:21 AM on 11/24/2022 to document services personally performed by Darin Soria MD based on my observations and the provider's statements to me.        Darin Soria MD  11/24/22 1409

## 2022-11-24 NOTE — TELEPHONE ENCOUNTER
Pt was in the ER this morning and is looking for her INR result from the ER. Pt was given her result, and is very happy that her INR is down now. Pt reports there was a mistake done by the lab yesterday, and she has filed a complaint about this. Pt was advised to call PCP tomorrow when the clinic is open if she has further questions or concerns.    Pt verbalized understanding.    Denny Rosado RN on 11/24/2022 at 3:58 PM      Reason for Disposition    Caller requesting routine or non-urgent lab result    Additional Information    Negative: Lab calling with strep throat test results and triager can call in prescription    Negative: Lab calling with urinalysis test results and triager can call in prescription    Negative: Medication questions    Negative: Medication renewal and refill questions    Negative: Pre-operative or pre-procedural questions    Negative: ED call to PCP (i.e., primary care provider; doctor, NP, or PA)    Negative: Doctor (or NP/PA) call to PCP    Negative: Call about patient who is currently hospitalized    Negative: Lab or radiology calling with CRITICAL test results    Negative: [1] Follow-up call from patient regarding patient's clinical status AND [2] information urgent    Negative: [1] Caller requests to speak ONLY to PCP AND [2] URGENT question    Negative: [1] Caller requests to speak to PCP now AND [2] won't tell us reason for call  (Exception: If 10 pm to 6 am, caller must first discuss reason for the call.)    Negative: Notification of hospital admission    Negative: Notification of death    Negative: Caller requesting lab results  (Exception: Routine or non-urgent lab result.)    Negative: Lab or radiology calling with test results    Negative: [1] Follow-up call from patient regarding patient's clinical status AND [2] information NON-URGENT    Negative: [1] Caller requests to speak ONLY to PCP AND [2] NON-URGENT question    Negative: Caller requesting an appointment, triage  offered and declined    Protocols used: PCP CALL - NO TRIAGE-A-AH

## 2022-11-24 NOTE — TELEPHONE ENCOUNTER
Nurse Triage SBAR    Is this a 2nd Level Triage? NO    Situation: Patient calling with concerns about her INR being high. Patient is having significant bruising and is INR result from yesterday is pending.  Consent: not needed    Background: Patient's INR was drawn by her home care nurse and was reported to be 6.2. Patient went in to clinic for a lab draw that is still being processed.    Assessment:   Woke up this morning with more bruising  Tennis ball sized bruise on left breast  Left elbow - baseball -sized bruise  More purple ones on wrist and up arm  Dizziness and lightheaded  No fever  No blood in urine  Held Coumadin since Tues    Protocol Recommended Disposition:   Go to ED Now    Recommendation: Advised patient to Go to ED now . Care advice given.  Patient verbalized understanding and agreed with plan.     Maria Fernanda Ventura RN [facility] 11/24/2022 8:19 AM    Reason for Disposition    Dizziness or lightheadedness    Additional Information    Negative: Shock suspected (e.g., cold/pale/clammy skin, too weak to stand, low BP, rapid pulse)    Negative: Sounds like a life-threatening emergency to the triager    Negative: Bruises with fever    Negative: Tiny bruises (spots or dots) of unknown cause    Negative: Bruise(s) of forehead or head    Negative: Bruise(s) of face or jaw    Negative: Followed an injury, and triager doesn't know which injury guideline to use first    Negative: Post-operative bruising    Protocols used: BRUISES-A-

## 2022-11-24 NOTE — TELEPHONE ENCOUNTER
Info Only - No triage    Pt is wanting to know if her lab results have been processed. She had an INR drawn early this afternoon. This writer was able to confirm that the lab is in process at [facility]. Pt was assured that someone would reach out to her once the results are processed if it is a critical value. Pt verbalized understanding.    Jeanne Salamanca RN [facility] 11/23/2022 9:11 PM    Reason for Disposition    Caller requesting lab results  (Exception: Routine or non-urgent lab result.)    Additional Information    Negative: ED call to PCP (i.e., primary care provider; doctor, NP, or PA)    Negative: Doctor (or NP/PA) call to PCP    Negative: Call about patient who is currently hospitalized    Negative: Lab or radiology calling with CRITICAL test results    Negative: [1] Follow-up call from patient regarding patient's clinical status AND [2] information urgent    Negative: [1] Caller requests to speak ONLY to PCP AND [2] URGENT question    Negative: [1] Caller requests to speak to PCP now AND [2] won't tell us reason for call  (Exception: If 10 pm to 6 am, caller must first discuss reason for the call.)    Negative: Notification of hospital admission    Negative: Notification of death    Negative: Lab calling with strep throat test results and triager can call in prescription    Negative: Lab calling with urinalysis test results and triager can call in prescription    Negative: Medication questions    Negative: Medication renewal and refill questions    Negative: Pre-operative or pre-procedural questions    Protocols used: [facility]

## 2022-11-25 ENCOUNTER — TELEPHONE (OUTPATIENT)
Dept: INTERNAL MEDICINE | Facility: CLINIC | Age: 82
End: 2022-11-25

## 2022-11-25 LAB — INR PPP: 4.92 (ref 0.85–1.15)

## 2022-11-25 NOTE — PROGRESS NOTES
"ANTICOAGULATION MANAGEMENT     Savanna Rehman 82 year old female is on warfarin with supratherapeutic INR result. (Goal INR 2.0-3.0)    Recent labs: (last 7 days)     11/24/22  1027   INR 3.54*       ASSESSMENT       Source(s): Chart Review and Home Care/Facility Nurse       Warfarin doses taken: warfarin has been held since Tuesday. ED instructed her to hold last night.     Diet: No new diet changes identified    New illness, injury, or hospitalization: Yes: Savanna went to the ED yesterday because she was anxious about her INR. Noted that venous result from Tuesday is still showing as \"in process\". Spoke with home care RN Raj and she reports that Savanna was told yesterday that the sample was mistakenly left at the lab until yesterday. This INR result is from the ED yesterday.     Medication/supplement changes: None noted    Signs or symptoms of bleeding or clotting: Yes: bruising, unchanged since earlier this week    Previous INR: Supratherapeutic    Additional findings: None       PLAN     Recommended plan for no diet, medication or health factor changes affecting INR     Dosing Instructions: decrease your warfarin dose (7% change) with next INR in 5 days. Noted that maintenance dose was adjusted earlier this week by 12% and we have not yet seen the full affect of this change. However, we have been unable to get a venous INR until today, will slightly adjust further and reassess early next week.       Summary  As of 11/23/2022    Full warfarin instructions:  11/23: Hold; 11/24: Hold; Otherwise 1.25 mg every Fri; 2.5 mg all other days; Starting 11/23/2022   Next INR check:  11/29/2022             Telephone call with Raj home care nurse who verbalizes understanding and agrees to plan    Orders given to  Homecare nurse/facility to recheck    Education provided:     Goal range and lab monitoring: goal range and significance of current result    Symptom monitoring: monitoring for bleeding signs and " symptoms    Plan made per [facility] anticoagulation protocol    Sahra Ferrari, RN  Anticoagulation Clinic  11/25/2022    _______________________________________________________________________     Anticoagulation Episode Summary     Current INR goal:  2.0-3.0   TTR:  70.2 % (1 y)   Target end date:  Indefinite   Send INR reminders to:  [facility]    Indications    Permanent atrial fibrillation (H) [I48.21]  Chronic atrial fibrillation (H) [I48.20]  Long term (current) use of anticoagulants [Z79.01]           Comments:  Home care          Anticoagulation Care Providers     Provider Role Specialty Phone number    Patti Suárez MD Referring Internal Medicine [phone]

## 2022-11-28 ENCOUNTER — VIRTUAL VISIT (OUTPATIENT)
Dept: PSYCHOLOGY | Facility: CLINIC | Age: 82
End: 2022-11-28
Payer: COMMERCIAL

## 2022-11-28 DIAGNOSIS — F43.22 ADJUSTMENT DISORDER WITH ANXIETY: ICD-10-CM

## 2022-11-28 PROCEDURE — 90834 PSYTX W PT 45 MINUTES: CPT | Mod: 95

## 2022-11-28 ASSESSMENT — COLUMBIA-SUICIDE SEVERITY RATING SCALE - C-SSRS
ATTEMPT SINCE LAST CONTACT: NO
2. HAVE YOU ACTUALLY HAD ANY THOUGHTS OF KILLING YOURSELF?: NO
1. SINCE LAST CONTACT, HAVE YOU WISHED YOU WERE DEAD OR WISHED YOU COULD GO TO SLEEP AND NOT WAKE UP?: NO
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
## 2022-11-28 NOTE — PROGRESS NOTES
Noted that the INR from 11/23/22 has now resulted (after close of clinic hours on Friday 11/25/22) and was 4.92. Dosing adjustment below was based off of the more recent INR result done in the ED of 3.54.    No change of plan to dosing or recheck as below.

## 2022-11-29 ENCOUNTER — ANTICOAGULATION THERAPY VISIT (OUTPATIENT)
Dept: ANTICOAGULATION | Facility: CLINIC | Age: 82
End: 2022-11-29

## 2022-11-29 DIAGNOSIS — I48.21 PERMANENT ATRIAL FIBRILLATION (H): Primary | ICD-10-CM

## 2022-11-29 DIAGNOSIS — I48.20 CHRONIC ATRIAL FIBRILLATION (H): ICD-10-CM

## 2022-11-29 DIAGNOSIS — Z79.01 LONG TERM (CURRENT) USE OF ANTICOAGULANTS: ICD-10-CM

## 2022-11-29 LAB — INR (EXTERNAL): 1.5 (ref 0.9–1.1)

## 2022-11-29 NOTE — PROGRESS NOTES
ANTICOAGULATION MANAGEMENT     Savanna Rehman 82 year old female is on warfarin with supratherapeutic INR result. (Goal INR 2.0-3.0)    Recent labs: (last 7 days)     11/29/22  1027   INR 1.5       ASSESSMENT       Source(s): Chart Review and Home Care/Facility Nurse       Warfarin doses taken: Warfarin taken as instructed and Warfarin held three days last week for supratherapeutic INR    Diet: Ate a whole bag of spinach on 11/25    New illness, injury, or hospitalization: No    Medication/supplement changes: Has been taking Aleve for the past couple weeks. Advised to change to tylenol as Aleve can increase bleeding risk    Signs or symptoms of bleeding or clotting: No    Previous INR: Therapeutic last 2(+) visitsAdditional findings: None     PLAN     Recommended plan for temporary change(s) affecting INR     Dosing Instructions: booster dose then continue your current warfarin dose with next INR in 6 days       Summary  As of 11/29/2022    Full warfarin instructions:  11/29: 3.75 mg; Otherwise 1.25 mg every Fri; 2.5 mg all other days; Starting 11/29/2022   Next INR check:  12/5/2022             Telephone call with Savanna and  CLIFFORD Nick who verbalize understanding and agree to plan    Orders given to  Homecare nurse/facility to recheck    Education provided:     Goal range and lab monitoring: goal range and significance of current result    Dietary considerations: impact of vitamin K foods on INR and vitamin K content of foods    Healthy lifestyle considerations: potential interaction between warfarin and alcohol, limit alcohol intake to no more than 1 to 2 drinks in 24 hours if you choose to drink alcohol and avoid excessive alcohol drinking (binge drinking) while on warfarin due to increased risk of bleeding from effect on INR and fall risk     Interaction between warfarin and aleve    Plan made per [facility] anticoagulation protocol    Miko West RN  Anticoagulation  Clinic  11/29/2022    _______________________________________________________________________     Anticoagulation Episode Summary     Current INR goal:  2.0-3.0   TTR:  69.6 % (1 y)   Target end date:  Indefinite   Send INR reminders to:  NIKKI CHASE    Indications    Permanent atrial fibrillation (H) [I48.21]  Chronic atrial fibrillation (H) [I48.20]  Long term (current) use of anticoagulants [Z79.01]           Comments:  Home care          Anticoagulation Care Providers     Provider Role Specialty Phone number    Patti Suárez MD Referring Internal Medicine [phone]

## 2022-11-30 ENCOUNTER — TELEPHONE (OUTPATIENT)
Dept: INTERNAL MEDICINE | Facility: CLINIC | Age: 82
End: 2022-11-30

## 2022-11-30 NOTE — TELEPHONE ENCOUNTER
Patient could not get into see PCP until February so she booked with Maria Fernanda Shah in January for her Annual Wellness.  Patient is asking for lab orderd to be entered so that she can have them drawn in [place] prior to her appointment.  Patient has a bad experience and will not have labs drawn at [facility] lab.

## 2022-12-01 ENCOUNTER — TELEPHONE (OUTPATIENT)
Dept: GASTROENTEROLOGY | Facility: CLINIC | Age: 82
End: 2022-12-01

## 2022-12-01 ENCOUNTER — HOSPITAL ENCOUNTER (OUTPATIENT)
Facility: CLINIC | Age: 82
End: 2022-12-01
Attending: COLON & RECTAL SURGERY | Admitting: COLON & RECTAL SURGERY
Payer: COMMERCIAL

## 2022-12-01 NOTE — TELEPHONE ENCOUNTER
Screening Questions  BLUE  KIND OF PREP RED  LOCATION [review exclusion criteria] GREEN  SEDATION TYPE       Jamilah you active on mychart?       Patti Suárez MD    Ordering/Referring Provider?        ARE/MEDICARE What type of coverage do you have?      N Have you had a positive covid test in the last 14 days?     38 1. BMI  [BMI 40+ - review exclusion criteria]    Y  2. Are you able to give consent for your medical care? [IF NO,RN REVIEW]        N  3. Are you taking any prescription pain medications on a routine schedule?        3a. EXTENDED PREP What kind of prescription?     N 4. Do you have any chemical dependencies such as alcohol, street drugs, or methadone?    N 5. Do you have any history of post-traumatic stress syndrome, severe anxiety or history of psychosis?      **If yes 3- 5 , please schedule with MAC sedation.**          IF YES TO ANY 6 - 10 - HOSPITAL SETTING ONLY.     N 6.   Do you need assistance transferring?     N 7.   Have you had a heart or lung transplant?    N 8.   Are you currently on dialysis?   N 9.   Do you use daily home oxygen?   N 10. Do you take nitroglycerin?   10a.  If yes, how often?     11. [FEMALES]  N Are you currently pregnant?    11a.  If yes, how many weeks? [ Greater than 12 weeks, OR NEEDED]    N 12. Do you have Pulmonary Hypertension? *NEED PAC APPT AT [facility]*     N 13. [review exclusion criteria]  Do you have any implantable devices in your body (pacemaker, defib, LVAD)?    N 14. In the past 6 months, have you had any heart related issues including cardiomyopathy or heart attack?     14a.  If yes, did it require cardiac stenting if so when?     N 15. Have you had a stroke or Transient ischemic attack (TIA - aka  mini stroke ) within 6 months?      Y 16. Do you have mod to severe Obstructive Sleep Apnea?  [Hospital only - Ok at [place]]    N 17. Do you have SEVERE AND UNCONTROLLED asthma? *NEED PAC APPT AT [facility]*     Y 18. Are you currently taking  "any blood thinners?     18a. If yes, inform patient to \"follow up w/ ordering provider for bridging instructions.\"    N 19. Do you take the medication Phentermine?    19a. If yes, \"Hold for 7 days before procedure.  Please consult your prescribing provider if you have questions about holding this medication.\"     N  20. Do you have chronic kidney disease?      N  21. Do you have a diagnosis of diabetes?     N  22. On a regular basis do you go 3-5 days between bowel movements?      23. Preferred LOCAL Pharmacy for Pre Prescription    [ LIST ONLY ONE PHARMACY]        [id number] - [facility] [address] [place] [address]      - CLOSING REMINDERS -    Informed patient they will need an adult    Cannot take any type of public or medical transportation alone    Conscious Sedation- Needs  for 6 hours after the procedure       MAC/General-Needs  for 24 hours after procedure    Pre-Procedure Covid test to be completed [[facility] PCR Testing Required]    Confirmed Nurse will call to complete assessment       - SCHEDULING DETAILS -     JESS  Surgeon    1/26  Date of Procedure  Lower Endoscopy [Colonoscopy]  Type of Procedure Scheduled  [facility] Location   [facility]-If you answer yes to questions #8, #20, #21Which Colonoscopy Prep was Sent?     MAC Sedation Type     Y WITH PCP PAC / Pre-op Required         Additional comments:  Patient request for MAC sedation stated previous procedure was uncomfortable.        "

## 2022-12-02 NOTE — TELEPHONE ENCOUNTER
Let patient know full lab done 11/2022  If she has repeat lab in January of all she would probably have to pay out of pocket for them   Are there specific lab she is wanting repeated/

## 2022-12-02 NOTE — TELEPHONE ENCOUNTER
Left voice message for patient to call back.   Please advise patient of message from Maria Fernanda when calling back.     Mahsa Arteaga RN  [facility]

## 2022-12-02 NOTE — TELEPHONE ENCOUNTER
Patient had blood test from November 7.  If any blood tests are needed, Maria Fernanda Shah NP will recommend.  If the patient wants a blood test before the appointment, most likely will not be covered by insurance since yesterday out-of-pocket

## 2022-12-02 NOTE — TELEPHONE ENCOUNTER
Next 5 appointments (look out 90 days)    Jan 11, 2023  1:30 PM  (Arrive by 1:10 PM)  Annual Wellness Visit with BATSHEVA Padilla CNP  [facility] ([facility] ) [address]  [address]  [facility] [id number] [phone]

## 2022-12-05 ENCOUNTER — VIRTUAL VISIT (OUTPATIENT)
Dept: PSYCHOLOGY | Facility: CLINIC | Age: 82
End: 2022-12-05
Payer: COMMERCIAL

## 2022-12-05 DIAGNOSIS — F43.23 ADJUSTMENT DISORDER WITH MIXED ANXIETY AND DEPRESSED MOOD: ICD-10-CM

## 2022-12-05 PROCEDURE — 90791 PSYCH DIAGNOSTIC EVALUATION: CPT | Mod: 95

## 2022-12-05 NOTE — PROGRESS NOTES
Update: JENNA Anthony RN contacted [facility] team requesting INR recheck tomorrow - patient is no longer available today and requested visit tomorrow. Writer approved and updated calendar. HC RN will ensure patient knows to continue warfarin 2.5 mg tonight. Lori Mendez, JAMIN, RN
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
## 2022-12-06 ENCOUNTER — ANTICOAGULATION THERAPY VISIT (OUTPATIENT)
Dept: ANTICOAGULATION | Facility: CLINIC | Age: 82
End: 2022-12-06

## 2022-12-06 ENCOUNTER — TELEPHONE (OUTPATIENT)
Dept: INTERNAL MEDICINE | Facility: CLINIC | Age: 82
End: 2022-12-06

## 2022-12-06 DIAGNOSIS — I48.21 PERMANENT ATRIAL FIBRILLATION (H): Primary | ICD-10-CM

## 2022-12-06 DIAGNOSIS — I48.20 CHRONIC ATRIAL FIBRILLATION (H): ICD-10-CM

## 2022-12-06 DIAGNOSIS — Z79.01 LONG TERM (CURRENT) USE OF ANTICOAGULANTS: ICD-10-CM

## 2022-12-06 LAB — INR (EXTERNAL): 1.6 (ref 0.9–1.1)

## 2022-12-06 NOTE — PROGRESS NOTES
ANTICOAGULATION MANAGEMENT     Savanna Rehman 82 year old female is on warfarin with subtherapeutic INR result. (Goal INR 2.0-3.0)    Recent labs: (last 7 days)     12/06/22  1152   INR 1.6*       ASSESSMENT       Source(s): Chart Review and Home Care/Facility Nurse       Warfarin doses taken: Warfarin taken as instructed    Diet: No new diet changes identified    New illness, injury, or hospitalization: No    Medication/supplement changes: None noted    Signs or symptoms of bleeding or clotting: No    Previous INR: Subtherapeutic    Additional findings: None       PLAN     Recommended plan for no diet, medication or health factor changes affecting INR     Dosing Instructions: Increase your warfarin dose (15.4% change) with next INR in 1 week. Went slightly above protocol as only increasing one day would have given the patient the same weekly amount of 17.5 mg with the result of 1.6.  Either adjustment would have been above or below protocol.      Summary  As of 12/6/2022    Full warfarin instructions:  3.75 mg every Tue; 2.5 mg all other days; Starting 12/6/2022   Next INR check:  12/13/2022             Telephone call with [facility] care nurse who agrees to plan and repeated back plan correctly    Orders given to  Homecare nurse/facility to recheck    Education provided:     Dietary considerations: importance of consistent vitamin K intake, impact of vitamin K foods on INR and vitamin K content of foods    Plan made per [facility] anticoagulation protocol    Roya Sky, RN  Anticoagulation Clinic  12/6/2022    _______________________________________________________________________     Anticoagulation Episode Summary     Current INR goal:  2.0-3.0   TTR:  67.7 % (1 y)   Target end date:  Indefinite   Send INR reminders to:  NIKKI CHASE    Indications    Permanent atrial fibrillation (H) [I48.21]  Chronic atrial fibrillation (H) [I48.20]  Long term (current) use of anticoagulants [Z79.01]            Comments:  Home care          Anticoagulation Care Providers     Provider Role Specialty Phone number    Patti Suárez MD Referring Internal Medicine [phone]

## 2022-12-08 ENCOUNTER — MEDICAL CORRESPONDENCE (OUTPATIENT)
Dept: HEALTH INFORMATION MANAGEMENT | Facility: CLINIC | Age: 82
End: 2022-12-08

## 2022-12-09 DIAGNOSIS — E11.42 DIABETIC POLYNEUROPATHY ASSOCIATED WITH TYPE 2 DIABETES MELLITUS (H): ICD-10-CM

## 2022-12-09 DIAGNOSIS — E11.65 TYPE 2 DIABETES MELLITUS WITH HYPERGLYCEMIA, WITHOUT LONG-TERM CURRENT USE OF INSULIN (H): ICD-10-CM

## 2022-12-09 RX ORDER — BLOOD SUGAR DIAGNOSTIC
STRIP MISCELLANEOUS
Qty: 100 STRIP | Refills: 0 | Status: SHIPPED | OUTPATIENT
Start: 2022-12-09 | End: 2023-03-07
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
## 2022-12-12 ENCOUNTER — TELEPHONE (OUTPATIENT)
Dept: INTERNAL MEDICINE | Facility: CLINIC | Age: 82
End: 2022-12-12

## 2022-12-12 ENCOUNTER — PATIENT OUTREACH (OUTPATIENT)
Dept: CARE COORDINATION | Facility: CLINIC | Age: 82
End: 2022-12-12

## 2022-12-12 ENCOUNTER — FCC EXTENDED DOCUMENTATION (OUTPATIENT)
Dept: PSYCHOLOGY | Facility: CLINIC | Age: 82
End: 2022-12-12

## 2022-12-12 DIAGNOSIS — F43.23 ADJUSTMENT DISORDER WITH MIXED ANXIETY AND DEPRESSED MOOD: Primary | ICD-10-CM
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
## 2022-12-12 NOTE — PROGRESS NOTES
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 Pt shared that her daughter and close friend had reported it is hard to be around her because of her anxiety and she would like to work on ways to reduce her anxiety.       Reason for Discharge:  Pt reported desire to end therapy      Disposition at Time of Last Encounter:   Comments:   Pt reported continued anxiety at last appointment. Pt was hyperverbal and appeared anxious.     Risk Management:   Client denies a history of suicidal ideation, suicide attempts, self-injurious behavior, homicidal ideation, homicidal behavior and and other safety concerns  Recommended that patient call 911 or go to the local ED should there be a change in any of these risk factors.      Referred To:  SIENA Rondon   12/12/2022    "

## 2022-12-12 NOTE — PROGRESS NOTES
Clinic Care Coordination Contact  Care Team Conversations    Received call/voicemail from patient requesting assistance with contacting patient advocate whom she recently spoke with regarding an unfavorable experience. Patient shared she was provided [facility] contact information by clinic staff. Returned patients call and offered to contact patient relations in attempt to locate patient advocate. Patient in agreement with plan. Spoke with Tita, patient representative, whom shared patient was previously working with patient representative, Emily. Connected patients call with Tita whom then assisted patient in connecting with Emily. Patient thankful for assistance. No further needs identified at this time.    ANA LUISA Pozo/[facility]  Social Work Care Coordinator  [facility], [place], and [place]  Phone: [phone]

## 2022-12-13 ENCOUNTER — TELEPHONE (OUTPATIENT)
Dept: INTERNAL MEDICINE | Facility: CLINIC | Age: 82
End: 2022-12-13

## 2022-12-13 ENCOUNTER — ANTICOAGULATION THERAPY VISIT (OUTPATIENT)
Dept: ANTICOAGULATION | Facility: CLINIC | Age: 82
End: 2022-12-13

## 2022-12-13 DIAGNOSIS — Z79.01 LONG TERM (CURRENT) USE OF ANTICOAGULANTS: ICD-10-CM

## 2022-12-13 DIAGNOSIS — I48.20 CHRONIC ATRIAL FIBRILLATION (H): ICD-10-CM

## 2022-12-13 DIAGNOSIS — I48.21 PERMANENT ATRIAL FIBRILLATION (H): Primary | ICD-10-CM

## 2022-12-13 LAB — INR (EXTERNAL): 2.1 (ref 0.9–1.1)

## 2022-12-13 NOTE — TELEPHONE ENCOUNTER
Medication Review: New or Changed Medications; received via fax. Orders/Forms in your mailbox to be signed.

## 2022-12-13 NOTE — TELEPHONE ENCOUNTER
Missed SKILLED NURSING Visit: 7/31-8/6 per patient request; received via fax. Orders/Forms in your mailbox to be signed.

## 2022-12-13 NOTE — TELEPHONE ENCOUNTER
Missed SKILLED NURSING Visits: 7/10-7/16 due to regular RN being on vacation & patient refused another RN; received via fax. Orders/Forms in your mailbox to be signed.

## 2022-12-13 NOTE — TELEPHONE ENCOUNTER
Patient's home/cell number message on machine to call back.  Patient's daughters message on machine to call back (see Consent to Communicate).

## 2022-12-13 NOTE — PROGRESS NOTES
ANTICOAGULATION MANAGEMENT     Savanna Rehman 82 year old female is on warfarin with therapeutic INR result. (Goal INR 2.0-3.0)    Recent labs: (last 7 days)     12/13/22  1123   INR 2.1*       ASSESSMENT       Source(s): Chart Review and Home Care/Facility Nurse       Warfarin doses taken: Warfarin taken as instructed    Diet: No new diet changes identified    New illness, injury, or hospitalization: No    Medication/supplement changes: None noted    Signs or symptoms of bleeding or clotting: No    Previous INR: Subtherapeutic    Additional findings: None       PLAN     Recommended plan for no diet, medication or health factor changes affecting INR     Dosing Instructions: Continue your current warfarin dose with next INR in 1 week       Summary  As of 12/13/2022    Full warfarin instructions:  3.75 mg every Tue; 2.5 mg all other days; Starting 12/13/2022   Next INR check:  12/20/2022             Telephone call with Raj home care nurse who agrees to plan and repeated back plan correctly    Orders given to  Homecare nurse/facility to recheck    Education provided:     Please call back if any changes to your diet, medications or how you've been taking warfarin    Plan made per [facility] anticoagulation protocol    Roya Sky RN  Anticoagulation Clinic  12/13/2022    _______________________________________________________________________     Anticoagulation Episode Summary     Current INR goal:  2.0-3.0   TTR:  66.2 % (1 y)   Target end date:  Indefinite   Send INR reminders to:  NIKKI CHASE    Indications    Permanent atrial fibrillation (H) [I48.21]  Chronic atrial fibrillation (H) [I48.20]  Long term (current) use of anticoagulants [Z79.01]           Comments:  Home care          Anticoagulation Care Providers     Provider Role Specialty Phone number    Patti Suárez MD Referring Internal Medicine [phone]

## 2022-12-13 NOTE — TELEPHONE ENCOUNTER
I cannot find origin or reason for allergy referral.  Please assist pt change allergist consult as VV due to weather concerns.  Lin Up CNP

## 2022-12-20 NOTE — TELEPHONE ENCOUNTER
Patient states she received referral for allergist 8-2022.  Sounds like she just needed the phone number- number given.

## 2022-12-22 ENCOUNTER — DOCUMENTATION ONLY (OUTPATIENT)
Dept: ANTICOAGULATION | Facility: CLINIC | Age: 82
End: 2022-12-22

## 2022-12-22 NOTE — PROGRESS NOTES
Received call from Raj stating due to staffing issues they will not be able to see the patient until next Tuesday 12/27.  Advised that patient should continue on her current warfarin dose.  Raj will make sure Savanna is aware of this.

## 2022-12-27 ENCOUNTER — ANTICOAGULATION THERAPY VISIT (OUTPATIENT)
Dept: ANTICOAGULATION | Facility: CLINIC | Age: 82
End: 2022-12-27

## 2022-12-27 ENCOUNTER — TELEPHONE (OUTPATIENT)
Dept: ANTICOAGULATION | Facility: CLINIC | Age: 82
End: 2022-12-27
Payer: COMMERCIAL

## 2022-12-27 DIAGNOSIS — I48.20 CHRONIC ATRIAL FIBRILLATION (H): ICD-10-CM

## 2022-12-27 DIAGNOSIS — Z79.01 LONG TERM (CURRENT) USE OF ANTICOAGULANTS: ICD-10-CM

## 2022-12-27 DIAGNOSIS — I48.21 PERMANENT ATRIAL FIBRILLATION (H): Primary | ICD-10-CM

## 2022-12-27 LAB — INR (EXTERNAL): 1.6 (ref 0.9–1.1)

## 2022-12-27 NOTE — PROGRESS NOTES
ANTICOAGULATION MANAGEMENT     Savanna Rehman 82 year old female is on warfarin with subtherapeutic INR result. (Goal INR 2.0-3.0)    Recent labs: (last 7 days)     12/27/22  1236   INR 1.6*       ASSESSMENT       Source(s): Chart Review and Home Care/Facility Nurse       Warfarin doses taken: Patient missed a dose sometime last week but is not sure which day.  Also reports missing a dose 2 weeks ago.    Diet: No new diet changes identified    New illness, injury, or hospitalization: No    Medication/supplement changes: None noted    Signs or symptoms of bleeding or clotting: No    Previous INR: Therapeutic last visit; previously outside of goal range    Additional findings: None       PLAN     Recommended plan for temporary change(s) affecting INR     Dosing Instructions: booster dose then continue your current warfarin dose with next INR in 1 week       Summary  As of 12/27/2022    Full warfarin instructions:  12/27: 5 mg; Otherwise 3.75 mg every Tue; 2.5 mg all other days   Next INR check:  1/3/2023             Telephone call with Raj home care nurse who verbalizes understanding and agrees to plan    Orders given to  Homecare nurse/facility to recheck    Education provided:     Goal range and lab monitoring: goal range and significance of current result    Plan made per [facility] anticoagulation protocol    Miko West RN  Anticoagulation Clinic  12/27/2022    _______________________________________________________________________     Anticoagulation Episode Summary     Current INR goal:  2.0-3.0   TTR:  63.1 % (1 y)   Target end date:  Indefinite   Send INR reminders to:  NIKKI CHASE    Indications    Permanent atrial fibrillation (H) [I48.21]  Chronic atrial fibrillation (H) [I48.20]  Long term (current) use of anticoagulants [Z79.01]           Comments:  Home care          Anticoagulation Care Providers     Provider Role Specialty Phone number    Patti Suárez MD Referring Internal  Medicine [phone]

## 2022-12-27 NOTE — TELEPHONE ENCOUNTER
Per chart review patient is having colonoscopy 1/26/2023 with Dr. Maci Coronel at [facility].  Routing to [facility] for hold/bridge risk assessment. See 09/27 note for recent risk assessment.

## 2022-12-28 ENCOUNTER — TELEPHONE (OUTPATIENT)
Dept: INTERNAL MEDICINE | Facility: CLINIC | Age: 82
End: 2022-12-28

## 2022-12-28 NOTE — TELEPHONE ENCOUNTER
Patient fell at 1 am today. She got help getting up with the fire department. Rug rash and bruising. She is dizzy from a medication ( glipiZIDE (GLUCOTROL XL) 2.5 MG 24 hr tablet) she believes and thinks this is why she fell.   Ok to call and  [phone]

## 2023-01-02 ENCOUNTER — ANTICOAGULATION THERAPY VISIT (OUTPATIENT)
Dept: ANTICOAGULATION | Facility: CLINIC | Age: 83
End: 2023-01-02

## 2023-01-02 DIAGNOSIS — I48.21 PERMANENT ATRIAL FIBRILLATION (H): Primary | ICD-10-CM

## 2023-01-02 DIAGNOSIS — Z79.01 LONG TERM (CURRENT) USE OF ANTICOAGULANTS: ICD-10-CM

## 2023-01-02 DIAGNOSIS — I48.20 CHRONIC ATRIAL FIBRILLATION (H): ICD-10-CM

## 2023-01-02 LAB — INR (EXTERNAL): 2.4 (ref 0.9–1.1)

## 2023-01-02 NOTE — PROGRESS NOTES
ANTICOAGULATION MANAGEMENT     Savanna Rehman 82 year old female is on warfarin with therapeutic INR result. (Goal INR 2.0-3.0)    Recent labs: (last 7 days)     01/02/23  1153   INR 2.4*       ASSESSMENT       Source(s): Chart Review and Home Care/Facility Nurse       Warfarin doses taken: Warfarin taken as instructed    Diet: No new diet changes identified    New illness, injury, or hospitalization: Yes: Patient had a fall on 12/28/22.  Patient tripped on a towel on the kitchen floor.  Patient states she did hit her head.  Patient was on the floor for 5-6 hours before 911 was called.  Patient was evaluated by EMS.  Patient refused to go into the ED.  Patient is sore from the fall and has bruising on the left hip/buttocks from the fall.    Medication/supplement changes: None noted    Signs or symptoms of bleeding or clotting: Yes, from fall    Previous INR: Subtherapeutic    Additional findings: None       PLAN     Recommended plan for no diet, medication or health factor changes affecting INR     Dosing Instructions: Continue your current warfarin dose with next INR in 1 week       Summary  As of 1/2/2023    Full warfarin instructions:  3.75 mg every Tue; 2.5 mg all other days   Next INR check:  1/9/2023             Telephone call with Raj home care nurse who agrees to plan and repeated back plan correctly    Orders given to  Homecare nurse/facility to recheck    Education provided:     Symptom monitoring: monitoring for bleeding signs and symptoms and when to seek medical attention/emergency care    Plan made per [facility] anticoagulation protocol    Roya Sky, RN  Anticoagulation Clinic  1/2/2023    _______________________________________________________________________     Anticoagulation Episode Summary     Current INR goal:  2.0-3.0   TTR:  62.4 % (1 y)   Target end date:  Indefinite   Send INR reminders to:  NIKKI CHASE    Indications    Permanent atrial fibrillation (H) [I48.21]  Chronic  atrial fibrillation (H) [I48.20]  Long term (current) use of anticoagulants [Z79.01]           Comments:  Home care          Anticoagulation Care Providers     Provider Role Specialty Phone number    Patti Suárez MD Referring Internal Medicine [phone]

## 2023-01-03 NOTE — TELEPHONE ENCOUNTER
Patient advised she may stop the Glipizide.  Patient said she started taking a half dose daily and that did not help the dizziness so she is happy to stop it.  She will monitor her blood sugars and call back with readings. BENITA Graham R.N.

## 2023-01-04 ENCOUNTER — TELEPHONE (OUTPATIENT)
Dept: INTERNAL MEDICINE | Facility: CLINIC | Age: 83
End: 2023-01-04

## 2023-01-05 ENCOUNTER — TELEPHONE (OUTPATIENT)
Dept: INTERNAL MEDICINE | Facility: CLINIC | Age: 83
End: 2023-01-05

## 2023-01-05 NOTE — TELEPHONE ENCOUNTER
Future Appointments 1/5/2023 - 7/4/2023      Date Visit Type Length Department Provider     1/11/2023  1:30 PM ANNUAL WELLNESS 30 min RI DEEJAY Shah, BATSHEVA Patel CNP    Location Instructions:     [facility] is in the [facility] at [address] next to [facility]. This is near the [address] and the [address]  exits off of [address]. To reach the clinic from [place] [address], turn north onto [address], then turn east on [address]. Clinic parking is available next to the [facility], which is just east of the hospital s main entrance.               1/18/2023 11:30 AM RETURN PATIENT 15 min [facility] UR[facility] RANDEE Wilson, Deisy Morales MD    Location Instructions:     Clinic is located at [address] [place] [address], [facility] [address], [facility] [address]

## 2023-01-06 ENCOUNTER — TELEPHONE (OUTPATIENT)
Dept: UROLOGY | Facility: CLINIC | Age: 83
End: 2023-01-06

## 2023-01-06 ENCOUNTER — TELEPHONE (OUTPATIENT)
Dept: INTERNAL MEDICINE | Facility: CLINIC | Age: 83
End: 2023-01-06

## 2023-01-06 NOTE — TELEPHONE ENCOUNTER
Patient calling stating she has been holding her glipizide since 1/3/23 due to dizziness.     Her sugars are going up.    All fasting sugars  1/3-135  1/4-153  1/5-179  1/6-197    Next 5 appointments (look out 90 days)    Jan 11, 2023  1:30 PM  (Arrive by 1:10 PM)  Pre-Operative Physical with BATSHEVA Padilla CNP  [facility] ([facility] ) [address]  [address]  [facility] [address] [phone]

## 2023-01-06 NOTE — TELEPHONE ENCOUNTER
"Call to patient. States she was \"hurt very badly\". States she has purple/black bruises. Patient states she chose not to go to the ER because she was in so much pain. Up comiing appointment is for a preop physical for an upcoming colonoscopy with general anesthesia. Fall was 12/27/22. Patient injured left wrist. States there is a 3 x 3 inch bruise on her left lower arm that is starting to yellow. Both knees are also bruised and yellowing. Butt has a purple bruise the size of a basketball. There is also a large bruise from belly to hip bone on left side. There is a 1/2 inch laceration on her left arm. Per description this sounds like a skin tear. Patient did hit her head and states she had a headache for several days which has resolved. Wrists are still sore. Paramedics were called and patient refused to go to ER. Patient also reports dizziness. States she was recently advised to stop Glipizide due to this. States she has been dizzy for \"2 years\". Patient states she had a party the night of the fall and she does not have a rug in her entry for people to put their shoes on so she put a bath towel down and did not pick it up after her guests left.. Patient tripped on this towel. She was on the floor for several hours. Patient has ordered a life alert type of necklace that is coming in the mail.    Routing to primary care provider and Maria Fernanda WORTHY for upcoming appointment.    "

## 2023-01-06 NOTE — TELEPHONE ENCOUNTER
Patient calling to ask if she can change her appointment to noon the same day. There are no openings to change the appointment. Patient's daughter is coming to the appointment but will be late. Will be arriving around 1145 am. Placed a comment in the appointment area to make staff aware.    Adelaida Liu RN, BSN  Care Coordinator Urology

## 2023-01-08 ENCOUNTER — NURSE TRIAGE (OUTPATIENT)
Dept: NURSING | Facility: CLINIC | Age: 83
End: 2023-01-08

## 2023-01-08 NOTE — TELEPHONE ENCOUNTER
Blood sugars are going up.  Last blood pressure in the chart is elevated.  Patient has appointment on January 11 with Maria Fernanda Shah NP to address the above issues.

## 2023-01-09 ENCOUNTER — ANTICOAGULATION THERAPY VISIT (OUTPATIENT)
Dept: ANTICOAGULATION | Facility: CLINIC | Age: 83
End: 2023-01-09

## 2023-01-09 ENCOUNTER — TELEPHONE (OUTPATIENT)
Dept: INTERNAL MEDICINE | Facility: CLINIC | Age: 83
End: 2023-01-09

## 2023-01-09 ENCOUNTER — HOSPITAL ENCOUNTER (OUTPATIENT)
Facility: CLINIC | Age: 83
End: 2023-01-09
Attending: COLON & RECTAL SURGERY | Admitting: COLON & RECTAL SURGERY
Payer: COMMERCIAL

## 2023-01-09 DIAGNOSIS — Z12.11 ENCOUNTER FOR SCREENING COLONOSCOPY: Primary | ICD-10-CM

## 2023-01-09 DIAGNOSIS — Z79.01 LONG TERM (CURRENT) USE OF ANTICOAGULANTS: ICD-10-CM

## 2023-01-09 DIAGNOSIS — I48.20 CHRONIC ATRIAL FIBRILLATION (H): ICD-10-CM

## 2023-01-09 DIAGNOSIS — I48.21 PERMANENT ATRIAL FIBRILLATION (H): Primary | ICD-10-CM

## 2023-01-09 NOTE — TELEPHONE ENCOUNTER
Patient on Coumadin with head Trauma and HA  I recommend ER    Maria Fernanda Shah will decide about Jan 11 appointment

## 2023-01-09 NOTE — TELEPHONE ENCOUNTER
Patient has appt for pre op 1/18/2023 and wants to know if she should have any lab work done before the appt  Ok to call and lm [phone]  Patient seeing Maria Fernanda Shah

## 2023-01-09 NOTE — TELEPHONE ENCOUNTER
Left message on patient's voicemail asking her to call back.  She needs to keep the in clinic appointment for pre op exam, not able to do this virtually.  Have her bring along a list of blood sugar readings.  If still having headache she should be seen in ER.  BENITA Graham R.N.

## 2023-01-09 NOTE — PROGRESS NOTES
Received call from [facility] Home care Nurse.  HC nurse is not able to see patient today and Patient refused to be seen by any other nurse.  Next Home care visit moved to end of week or first part of next week. Last INR therapeutic and no changes overall with meds, diet, activity, or recent Illness. Also no signs of increased bruising or bleeding noted. Patient to continue same warfarin maintenance dosing with INR check Friday or Monday.    Azul Whitaker, RN  Anticoagulation Nurse - [facility], [place]

## 2023-01-09 NOTE — TELEPHONE ENCOUNTER
Note there are 2 phone messages open on this patient.    Left message on patient's voicemail asking her to call back.  She needs to keep the in clinic appointment for pre op exam, not able to do this virtually.  Have her bring along a list of blood sugar readings.  If still having headache she should be seen in ER.  BENITA Graham R.N.

## 2023-01-09 NOTE — TELEPHONE ENCOUNTER
Patient took a fall on 12/27 and hit her head.  Patient had and continues to have a lot of pain and is purple.  Patient states she has a pre op with Maria Fernanda on 1/11/2023.  Patient has 2 questions:    1. Can that pre op appointment be a virtual appointment?  Patient states she is still very sore from her fall.  She states she would prefer not to leave her house.    2. If she can have a virtual appointment and there needs to be a lab draw can she get the lab draw at the [facility]?  Patient had an incident with the [place] lab and she isnt able to forgot that.    Roya Marie RN   01/08/23 9:20 PM  [facility] Nurse Advisor      Reason for Disposition    [1] Caller requesting NON-URGENT health information AND [2] PCP's office is the best resource    Additional Information    Negative: [1] Caller is not with the adult (patient) AND [2] reporting urgent symptoms    Negative: Lab result questions    Negative: Medication questions    Negative: Caller can't be reached by phone    Negative: Caller has already spoken to PCP or another triager    Negative: RN needs further essential information from caller in order to complete triage    Negative: Requesting regular office appointment    Protocols used: INFORMATION ONLY CALL - NO TRIAGE-A-

## 2023-01-11 NOTE — TELEPHONE ENCOUNTER
Patient rescheduled pre-op.  There are 2 encounters open right now on this patient. BENITA Graham R.N.

## 2023-01-13 ENCOUNTER — TELEPHONE (OUTPATIENT)
Dept: INTERNAL MEDICINE | Facility: CLINIC | Age: 83
End: 2023-01-13

## 2023-01-13 NOTE — TELEPHONE ENCOUNTER
Raj with [facility] calls asking to extend home health care. Her recert period is not until the 15th.       Best number to call back [phone]

## 2023-01-13 NOTE — TELEPHONE ENCOUNTER
"TYRON-PROCEDURAL ANTICOAGULATION  MANAGEMENT    ASSESSMENT     Warfarin interruption plan for colonoscopy on 23.    Indication for Anticoagulation: Atrial Fibrillation      [id number] = 4 (Age >= 75, Diabetes and Female)     CHF = ejection fraction < 40% (55-60% per 2021 cardio note)      Tyron-Procedure Risk stratification for thromboembolism: low (2017 ACC periprocedure pathway for NVAF Expert Consensus)    NVAF: 2017 ACC periprocedure pathway for NVAF advises NO bridge for low risk stratification ([id number] score <=4 and no prior hx of stroke, TIA or systemic embolism)     RECOMMENDATION       Pre-Procedure:  o Hold warfarin for 5 days, until after procedure startin23   o No Bridge      Post-Procedure:  o Resume warfarin dose if okay with provider doing procedure on night of procedure,  PM: 5 mg x 1 then resume home dose  o Recheck INR ~ 4-5days after resuming warfarin       Plan routed to referring provider for approval  ?   [place], [facility]    SUBJECTIVE/OBJECTIVE     Savanna Rehman, a 82 year old female    Goal INR Range: 2.0-3.0     Wt Readings from Last 3 Encounters:   22 113.4 kg (250 lb 1.6 oz)   22 114.8 kg (253 lb)   22 116.6 kg (257 lb)      Ideal body weight: 63.9 kg (140 lb 14 oz)  Adjusted ideal body weight: 83.7 kg (184 lb 9 oz)     Estimated body mass index is 38.03 kg/m  as calculated from the following:    Height as of 22: 1.727 m (5' 8\").    Weight as of 22: 113.4 kg (250 lb 1.6 oz).    Lab Results   Component Value Date    INR 2.4 (A) 2023    INR 1.6 (A) 2022    INR 2.1 (A) 2022     Lab Results   Component Value Date    HGB 15.2 2022    HGB 14.7 2021    HCT 46.0 2022    HCT 44.5 2021     2022     2021     Lab Results   Component Value Date    CR 0.86 2022    CR 1.02 2022    CR 1.02 2022     CrCl cannot be calculated (Patient's most recent lab result is " older than the maximum 30 days allowed.).

## 2023-01-16 NOTE — TELEPHONE ENCOUNTER
Patient called and was given warfarin interruption plan as outlined below for upcoming Colonoscopy Procedure scheduled for 1/26/2023    She stated that her Home Care Nurse normally comes in every Tuesday to check INR's.    She verbalized understanding and agrees to plan.

## 2023-01-17 ENCOUNTER — ANTICOAGULATION THERAPY VISIT (OUTPATIENT)
Dept: ANTICOAGULATION | Facility: CLINIC | Age: 83
End: 2023-01-17

## 2023-01-17 ENCOUNTER — OFFICE VISIT (OUTPATIENT)
Dept: FAMILY MEDICINE | Facility: CLINIC | Age: 83
End: 2023-01-17
Payer: COMMERCIAL

## 2023-01-17 VITALS
WEIGHT: 250.2 LBS | OXYGEN SATURATION: 95 % | RESPIRATION RATE: 18 BRPM | SYSTOLIC BLOOD PRESSURE: 125 MMHG | TEMPERATURE: 97.2 F | BODY MASS INDEX: 37.92 KG/M2 | HEIGHT: 68 IN | DIASTOLIC BLOOD PRESSURE: 81 MMHG | HEART RATE: 65 BPM

## 2023-01-17 DIAGNOSIS — I48.21 PERMANENT ATRIAL FIBRILLATION (H): Primary | ICD-10-CM

## 2023-01-17 DIAGNOSIS — E66.01 MORBID OBESITY (H): ICD-10-CM

## 2023-01-17 DIAGNOSIS — I48.20 CHRONIC ATRIAL FIBRILLATION (H): ICD-10-CM

## 2023-01-17 DIAGNOSIS — E11.22 TYPE 2 DIABETES MELLITUS WITH STAGE 3A CHRONIC KIDNEY DISEASE, WITHOUT LONG-TERM CURRENT USE OF INSULIN (H): ICD-10-CM

## 2023-01-17 DIAGNOSIS — N18.30 STAGE 3 CHRONIC KIDNEY DISEASE, UNSPECIFIED WHETHER STAGE 3A OR 3B CKD (H): ICD-10-CM

## 2023-01-17 DIAGNOSIS — Z79.01 LONG TERM (CURRENT) USE OF ANTICOAGULANTS: ICD-10-CM

## 2023-01-17 DIAGNOSIS — K52.9 CHRONIC DIARRHEA: ICD-10-CM

## 2023-01-17 DIAGNOSIS — G47.33 OSA (OBSTRUCTIVE SLEEP APNEA): ICD-10-CM

## 2023-01-17 DIAGNOSIS — N18.31 TYPE 2 DIABETES MELLITUS WITH STAGE 3A CHRONIC KIDNEY DISEASE, WITHOUT LONG-TERM CURRENT USE OF INSULIN (H): ICD-10-CM

## 2023-01-17 DIAGNOSIS — Z01.818 PREOP GENERAL PHYSICAL EXAM: Primary | ICD-10-CM

## 2023-01-17 DIAGNOSIS — I48.21 PERMANENT ATRIAL FIBRILLATION (H): ICD-10-CM

## 2023-01-17 LAB
ANION GAP SERPL CALCULATED.3IONS-SCNC: 17 MMOL/L (ref 7–15)
BUN SERPL-MCNC: 14.7 MG/DL (ref 8–23)
CALCIUM SERPL-MCNC: 9.6 MG/DL (ref 8.8–10.2)
CHLORIDE SERPL-SCNC: 99 MMOL/L (ref 98–107)
CREAT SERPL-MCNC: 0.96 MG/DL (ref 0.51–0.95)
DEPRECATED HCO3 PLAS-SCNC: 22 MMOL/L (ref 22–29)
GFR SERPL CREATININE-BSD FRML MDRD: 60 ML/MIN/1.73M2
GLUCOSE SERPL-MCNC: 161 MG/DL (ref 70–99)
INR (EXTERNAL): 2.5 (ref 0.9–1.1)
POTASSIUM SERPL-SCNC: 4.3 MMOL/L (ref 3.4–5.3)
SODIUM SERPL-SCNC: 138 MMOL/L (ref 136–145)

## 2023-01-17 PROCEDURE — 93000 ELECTROCARDIOGRAM COMPLETE: CPT | Performed by: PHYSICIAN ASSISTANT

## 2023-01-17 PROCEDURE — 80048 BASIC METABOLIC PNL TOTAL CA: CPT | Performed by: PHYSICIAN ASSISTANT

## 2023-01-17 PROCEDURE — 36415 COLL VENOUS BLD VENIPUNCTURE: CPT | Performed by: PHYSICIAN ASSISTANT

## 2023-01-17 PROCEDURE — 99215 OFFICE O/P EST HI 40 MIN: CPT | Performed by: PHYSICIAN ASSISTANT

## 2023-01-17 ASSESSMENT — PAIN SCALES - GENERAL: PAINLEVEL: EXTREME PAIN (9)

## 2023-01-17 NOTE — RESULT ENCOUNTER NOTE
Please let patient know recent EKg looks fine, unchanged from previous.    Thanks.  Araceli Cox PA-C

## 2023-01-17 NOTE — PROGRESS NOTES
[facility]  [address] [place] [address], [address]  [facility] [address]  Phone: [phone]  Primary Provider: Patti Suárez  Pre-op Performing Provider: LESLEY HUANG      PREOPERATIVE EVALUATION:  Today's date: 1/17/2023    Savanna Rehman is a 82 year old female who presents for a preoperative evaluation.    Surgical Information:  Surgery/Procedure: COLONOSCOPY  Surgery Location: [facility]  Surgeon: Maci Coronel MD  Surgery Date: 1/26/2023  Time of Surgery: 10AM  Where patient plans to recover: At home with family  Fax number for surgical facility: Note does not need to be faxed, will be available electronically in Epic.    Type of Anesthesia Anticipated: MAC    Assessment & Plan     The proposed surgical procedure is considered LOW risk.    (Z01.818) Preop general physical exam  (primary encounter diagnosis)  Comment: chronic issues stable  Plan: Basic metabolic panel  (Ca, Cl, CO2, Creat,         Gluc, K, Na, BUN), EKG 12-lead complete w/read         - Clinics  Cleared for procedure     (K52.9) Chronic diarrhea  Comment: x months, she states stools have been loose since she has c diff  Plan: colonoscopy    (N18.30) Stage 3 chronic kidney disease, unspecified whether stage 3a or 3b CKD (H)  Comment: GFR hovers 50-60  Plan: avoid hypotension, nephrotoxins     (I48.21) Permanent atrial fibrillation (H)  Comment: on warfarin   Plan: barbara-procedure plan per [facility] clinic     (E11.22,  N18.31) Type 2 diabetes mellitus with stage 3a chronic kidney disease, without long-term current use of insulin (H)  Comment: not currently taking medication, fast glucose runs 130-198  Plan: BMP toda    (G47.33) CRISTIANA (obstructive sleep apnea)   Comment:   Plan: please monitor 02 sats and cardio-pulm status closely in the barbara-operative period     Risks and Recommendations:  The patient has the following additional risks and recommendations for  perioperative complications:   - Consult Hospitalist / IM to assist with post-op medical management  Diabetes:  - Patient is not on insulin therapy: regular NPO guidelines can be followed.     Medication Instructions:  Don't take glipizide the am of surgery.    Warfarin instructions:  RECOMMENDATION      1. Pre-Procedure:  ? Hold warfarin for 5 days, until after procedure startin23   ? No Bridge     1. Post-Procedure:  ? Resume warfarin dose if okay with provider doing procedure on night of procedure,  PM: 5 mg x 1 then resume home dose  ? Recheck INR ~ 4-5days after resuming warfarin     RECOMMENDATION:  APPROVAL GIVEN to proceed with proposed procedure, without further diagnostic evaluation.    Araceli Cox PA-C  40 minutes on the day of the encounter doing chart review, history and exam, documentation and further activities as noted above.        Subjective     HPI related to upcoming procedure:   Savanna is here for pre-op for screening colonoscopy.    She is on warfarin and follows with INR clinic.  She has instructions for barbara-op use of warfarin.    She never started taking zoloft.      She recently stopped glipizide due to dizziness.  She has been reporting BG to pcp via [facility].  She notes she has had dizziness for years.    She notes fasting sugars have ranged from 135-198.    She does most of her own housekeeping.  She can't walk up a flight of stairs due to chronic pain.  She denies any chest pain or sob with exertion.      She denies history of MI, CVA or blood clots.  Preop Questions 2023   1. Have you ever had a heart attack or stroke? No   2. Have you ever had surgery on your heart or blood vessels, such as a stent placement, a coronary artery bypass, or surgery on an artery in your head, neck, heart, or legs? No   3. Do you have chest pain with activity? No   4. Do you have a history of  heart failure? No   5. Do you currently have a cold, bronchitis or symptoms of other  infection? No   6. Do you have a cough, shortness of breath, or wheezing? No   7. Do you or anyone in your family have previous history of blood clots? No   8. Do you or does anyone in your family have a serious bleeding problem such as prolonged bleeding following surgeries or cuts? No   9. Have you ever had problems with anemia or been told to take iron pills? No   10. Have you had any abnormal blood loss such as black, tarry or bloody stools, or abnormal vaginal bleeding? No   11. Have you ever had a blood transfusion? YES -    11a. Have you ever had a transfusion reaction? No   12. Are you willing to have a blood transfusion if it is medically needed before, during, or after your surgery? Yes   13. Have you or any of your relatives ever had problems with anesthesia? No   14. Do you have sleep apnea, excessive snoring or daytime drowsiness? YES -    14a. Do you have a CPAP machine? Yes   15. Do you have any artifical heart valves or other implanted medical devices like a pacemaker, defibrillator, or continuous glucose monitor? No   16. Do you have artificial joints? YES -    17. Are you allergic to latex? No   18. Is there any chance that you may be pregnant? -     Health Care Directive:  Patient does not have a Health Care Directive or Living Will: Discussed advance care planning with patient; information given to patient to review.    Review of Systems  CONSTITUTIONAL: NEGATIVE for fever, chills, change in weight  INTEGUMENTARY/SKIN: NEGATIVE for worrisome rashes, moles or lesions  EYES: NEGATIVE for vision changes or irritation  ENT/MOUTH: NEGATIVE for ear, mouth and throat problems  RESP: NEGATIVE for significant cough or SOB  CV: NEGATIVE for chest pain, palpitations or peripheral edema  GI: NEGATIVE for nausea, abdominal pain, heartburn, or change in bowel habits  : NEGATIVE for frequency, dysuria, or hematuria  HEME: NEGATIVE for bleeding problems    Patient Active Problem List    Diagnosis Date Noted      Adjustment disorder with mixed anxiety and depressed mood 12/05/2022     Priority: Medium     Adjustment disorder with anxiety 11/28/2022     Priority: Medium     Bony pelvic pain 07/06/2022     Priority: Medium     C. difficile colitis, recurrent -- she needs to take Vanco 125 bid anytime she is on a different Abx  03/18/2022     Priority: Medium     Chronic kidney disease, stage 3 10/07/2021     Priority: Medium     Ascending aorta dilatation (H) 04/20/2021     Priority: Medium     Nonrheumatic tricuspid valve regurgitation 04/20/2021     Priority: Medium     Anemia 12/16/2020     Priority: Medium     Formatting of this note might be different from the original.  Iron Deficiency anemia       Gastroesophageal reflux disease 12/16/2020     Priority: Medium     Gout 12/16/2020     Priority: Medium     Diabetes mellitus, type 2 (H) 09/03/2020     Priority: Medium     Vitamin D deficiency      Priority: Medium     Hyperlipidemia LDL goal <100 12/10/2019     Priority: Medium     Permanent atrial fibrillation (H) 10/23/2019     Priority: Medium     Long term (current) use of anticoagulants 10/23/2019     Priority: Medium     Chronic atrial fibrillation (H) 10/21/2019     Priority: Medium     Recurrent UTI 08/13/2019     Priority: Medium     Urgency-frequency syndrome 08/13/2019     Priority: Medium     Urgency incontinence 08/13/2019     Priority: Medium     Candidiasis of skin 08/13/2019     Priority: Medium     Morbid obesity (H) 08/29/2018     Priority: Medium     CRISTIANA (obstructive sleep apnea)  08/29/2018     Priority: Medium     Angioedema 03/09/2015     Priority: Medium      Past Medical History:   Diagnosis Date     Anemia     Iron Deficiency anemia     Atrial fibrillation (H)      CAD (coronary artery disease)     non-obstructive     Chronic pain     neck, low back, legs     Congestive heart failure (H)      Degenerative disk disease      Fibromyalgia      Gastro-oesophageal reflux disease      Gout       Hiatal hernia      Mumps      Neuropathy      CRISTIANA (obstructive sleep apnea) - CPAP      Palpitations      Pernicious anemia      Sleep apnea     uses CPAP.     Urinary incontinence      Vitamin D deficiency      Past Surgical History:   Procedure Laterality Date     APPENDECTOMY       CHOLECYSTECTOMY       COLONOSCOPY  3/15/2011     CORONARY ANGIOGRAPHY ADULT ORDER       CYSTOSCOPY, BIOPSY BLADDER, COMBINED N/A 7/13/2020    Procedure: CYSTOSCOPY, WITH BLADDER BIOPSY;  Surgeon: Deisy Wilson MD;  Location: [facility] OR     HEART CATH LEFT HEART CATH  12/30/16    medication management     HYSTERECTOMY TOTAL ABDOMINAL       Knee replacement NOS Left      LAPAROSCOPIC NISSEN FUNDOPLICATION N/A 2/4/2015    Procedure: LAPAROSCOPIC NISSEN FUNDOPLICATION;  Surgeon: Armando Ansari MD;  Location: [facility] OR     TONSILLECTOMY       TRANSPOSITION ULNAR NERVE (ELBOW)       Current Outpatient Medications   Medication Sig Dispense Refill     ACE/ARB/ARNI NOT PRESCRIBED (INTENTIONAL) Please choose reason not prescribed, below       alcohol swab prep pads Use to swab area of injection/chandrakant as directed. 100 each 3     blood glucose (NO BRAND SPECIFIED) lancets standard Use to test blood sugar  as directed. 1 each 0     blood glucose (NO BRAND SPECIFIED) lancing device Device to be used with lancets. Patient requests [employer] 2 lancing device to check blood glucose once per day. 1 each 0     blood glucose calibration (NO BRAND SPECIFIED) solution Use to calibrate blood glucose monitor 1 Bottle 3     blood glucose monitoring (NO BRAND SPECIFIED) meter device kit Use to test blood sugar 1 times daily or as directed. 1 kit 1     blood glucose monitoring (NO BRAND SPECIFIED) meter device kit Use to test blood sugar 1 time daily 1 kit 0     butalbital-aspirin-caffeine (FIORINAL) -40 MG capsule TAKE 1 CAPSULE BY MOUTH EVERY 6 HOURS AS NEEDED FOR HEADACHE 30 capsule 0     cholecalciferol (VITAMIN D3) 5000 units (125 mcg) capsule  Take 5,000 Units by mouth At Bedtime       EPINEPHrine (ANY BX GENERIC EQUIV) 0.3 MG/0.3ML injection 2-pack INJECT 0.3MLS (0.3MG) INTO THE MUSCLE ONCE AS NEEDED FOR ANAPHYLAXIS 2 each 1     estradiol (ESTRACE) 0.1 MG/GM vaginal cream Please use your finger to place a large blueberry size amount in the vagina nightly for two weeks and then three times a week at night thereafter 42.5 g 3     glipiZIDE (GLUCOTROL XL) 2.5 MG 24 hr tablet TAKE 1 TABLET TWICE A  tablet 3     meclizine (ANTIVERT) 12.5 MG tablet TAKE 1 TABLET BY MOUTH THREE TIMES DAILY AS NEEDED FOR DIZZINESS 30 tablet 1     mirabegron (MYRBETRIQ) 25 MG 24 hr tablet Take 1 tablet (25 mg) by mouth daily 30 tablet 3     nystatin (MYCOSTATIN) 436755 UNIT/GM external powder Use it twice a day to the perineal area 60 g 1     nystatin (MYCOSTATIN) 129698 UNIT/GM external powder Apply topically 2 times daily 60 g 1     ondansetron (ZOFRAN) 8 MG tablet Take 1 tablet (8 mg) by mouth every 8 hours as needed for nausea 20 tablet 0     ONETOUCH ULTRA test strip USE 1 STRIP TO CHECK GLUCOSE ONCE DAILY 100 strip 0     sertraline (ZOLOFT) 25 MG tablet Take 1 tablet (25 mg) by mouth daily Take prior to starting 50 mg dose. 7 tablet 0     sertraline (ZOLOFT) 50 MG tablet Take 1 tablet (50 mg) by mouth daily 30 tablet 1     warfarin ANTICOAGULANT (COUMADIN) 2.5 MG tablet TAKE 2.5 mg (2.5 mg x 1) every Tue, Thu, Sat; 3.75 mg (2.5 mg x 1.5) all other days or as directed. 135 tablet 1       Allergies   Allergen Reactions     Augmented Betamethasone Diprop [Betamethasone] Other (See Comments)     Severe yeast infection     Petroleum Jelly [Petrolatum] Anaphylaxis     Rash and swelling     Shellfish-Derived Products Anaphylaxis     Tongue swelling     Aspirin Swelling     tiongue swelling     Bacitracin      Rash swelling     Bactrim [Sulfamethoxazole W/Trimethoprim] Dizziness     Coumadin [Warfarin] Swelling     Leg swelling     Darvon [Propoxyphene] Swelling     Throat  "closes     Dilaudid [Hydromorphone]      Levaquin [Levofloxacin] Swelling     Tongue swelling     Neomycin Swelling     rash     Neosporin [Neomycin-Polymyx-Gramicid] Swelling     rash     Nitrofurantoin      SOB, GI upset,     Oxycodone      Severe itching     Percodan [Oxycodone-Aspirin]      Severe itching     Tramadol      Vicodin [Hydrocodone-Acetaminophen]      Severe itching       Xarelto [Rivaroxaban]      Adhesive Tape Rash     Band aids      Codeine Rash     Hydrocortisone Rash and Swelling     Other Environmental Allergy Rash     Adhesive tape   Band aids         Social History     Tobacco Use     Smoking status: Former     Packs/day: 0.50     Years: 50.00     Pack years: 25.00     Types: Cigarettes     Quit date: 2014     Years since quittin.3     Passive exposure: Never     Smokeless tobacco: Never   Substance Use Topics     Alcohol use: Yes     Comment: socially     Family History   Problem Relation Age of Onset     Alzheimer Disease Mother      Lung Cancer Father      No Known Problems Brother      No Known Problems Brother      No Known Problems Brother      Unknown/Adopted No family hx of      History   Drug Use Unknown         Objective     LMP  (LMP Unknown)      /81 (BP Location: Left arm, Patient Position: Sitting, Cuff Size: Adult Regular)   Pulse 65   Temp 97.2  F (36.2  C) (Temporal)   Resp 18   Ht 1.727 m (5' 8\")   Wt 113.5 kg (250 lb 3.2 oz)   LMP  (LMP Unknown)   SpO2 95%   BMI 38.04 kg/m        Physical Exam      GENERAL APPEARANCE: delightful, in NAD     EYES: no scleral icterus     HENT: OP clear mouth without ulcers or lesions     NECK: supple, no bruits     RESP: lungs clear to auscultation - no rales, rhonchi or wheezes     CV: irregularly irregular, no murmur, click or rub     ABDOMEN:  soft, nontender, no HSM or masses and bowel sounds normal     MS: extremities normal- no gross deformities noted, mild pitting edema bilat lower ext      NEURO: Normal " mentation, gait and speech normal      Recent Labs   Lab Test 01/17/23  1210 01/02/23  1153 11/15/22  1302 11/07/22  1218 06/07/22  1212 06/02/22  1129   HGB  --   --   --  15.2  --  16.0*   PLT  --   --   --  176  --  198   INR 2.5* 2.4*   < >  --    < > 2.5*   NA  --   --   --  140  --  137   POTASSIUM  --   --   --  4.6  --  4.7   CR  --   --   --  0.86  --  1.02   A1C  --   --   --  6.3*  --  6.5*    < > = values in this interval not displayed.        Diagnostics:  Labs pending at this time.  Results will be reviewed when available.   EKG: afib, rate of 58    Revised Cardiac Risk Index (RCRI):  The patient has the following serious cardiovascular risks for perioperative complications:   - No serious cardiac risks = 0 points     RCRI Interpretation: 0 points: Class I (very low risk - 0.4% complication rate)      Signed Electronically by: Araceli Cox PA-C  Copy of this evaluation report is provided to requesting physician.

## 2023-01-17 NOTE — PATIENT INSTRUCTIONS
Don't take glipizide the am of surgery.    Warfarin instructions:  RECOMMENDATION      Pre-Procedure:  Hold warfarin for 5 days, until after procedure startin23   No Bridge     Post-Procedure:  Resume warfarin dose if okay with provider doing procedure on night of procedure,  PM: 5 mg x 1 then resume home dose  Recheck INR ~ 4-5days after resuming warfarin

## 2023-01-17 NOTE — LETTER
January 18, 2023      Savanna Sosakarie  [address]   [facility] [address]        Dear ,    We are writing to inform you of your test results.    Please let patient know basic metabolic panel (kidney function, electrolytes) shows stable kidney function and electrolytes.        Resulted Orders   Basic metabolic panel  (Ca, Cl, CO2, Creat, Gluc, K, Na, BUN)   Result Value Ref Range    Sodium 138 136 - 145 mmol/L    Potassium 4.3 3.4 - 5.3 mmol/L    Chloride 99 98 - 107 mmol/L    Carbon Dioxide (CO2) 22 22 - 29 mmol/L    Anion Gap 17 (H) 7 - 15 mmol/L    Urea Nitrogen 14.7 8.0 - 23.0 mg/dL    Creatinine 0.96 (H) 0.51 - 0.95 mg/dL    Calcium 9.6 8.8 - 10.2 mg/dL    Glucose 161 (H) 70 - 99 mg/dL    GFR Estimate 60 (L) >60 mL/min/1.73m2      Comment:      Effective December 21, 2021 eGFRcr in adults is calculated using the 2021 CKD-EPI creatinine equation which includes age and gender ( et al., NEJM, DOI: 10.1056/JWINyr3088482)       If you have any questions or concerns, please call the clinic at the number listed above.       Sincerely,      Araceli Cox PA-C

## 2023-01-17 NOTE — PROGRESS NOTES
ANTICOAGULATION MANAGEMENT     Savanna Rehman 82 year old female is on warfarin with therapeutic INR result. (Goal INR 2.0-3.0)    Recent labs: (last 7 days)     01/17/23  1210   INR 2.5*       ASSESSMENT       Source(s): Chart Review and Home Care/Facility Nurse       Warfarin doses taken: Warfarin taken as instructed    Diet: No new diet changes identified    New illness, injury, or hospitalization: No    Medication/supplement changes: None noted    Signs or symptoms of bleeding or clotting: No    Previous INR: Therapeutic last visit; previously outside of goal range    Additional findings: None       PLAN     Recommended plan for no diet, medication or health factor changes affecting INR     Dosing Instructions: Continue your current warfarin dose with next INR in 2 weeks       Summary  As of 1/17/2023    Full warfarin instructions:  3.75 mg every Tue; 2.5 mg all other days   Next INR check:  1/31/2023             Telephone call with [facility] home care nurse who agrees to plan and repeated back plan correctly    Orders given to  Homecare nurse/facility to recheck    Education provided:     Please call back if any changes to your diet, medications or how you've been taking warfarin    Plan made per [facility] anticoagulation protocol    Azul Whitaker, RN  Anticoagulation Clinic  1/17/2023    _______________________________________________________________________     Anticoagulation Episode Summary     Current INR goal:  2.0-3.0   TTR:  64.7 % (1 y)   Target end date:  Indefinite   Send INR reminders to:  NIKKI CHASE    Indications    Permanent atrial fibrillation (H) [I48.21]  Chronic atrial fibrillation (H) [I48.20]  Long term (current) use of anticoagulants [Z79.01]           Comments:  Home care          Anticoagulation Care Providers     Provider Role Specialty Phone number    Patti Suárez MD Referring Internal Medicine [phone]

## 2023-01-18 ENCOUNTER — OFFICE VISIT (OUTPATIENT)
Dept: UROLOGY | Facility: CLINIC | Age: 83
End: 2023-01-18
Payer: COMMERCIAL

## 2023-01-18 VITALS
DIASTOLIC BLOOD PRESSURE: 80 MMHG | OXYGEN SATURATION: 97 % | HEIGHT: 68 IN | WEIGHT: 250 LBS | HEART RATE: 61 BPM | BODY MASS INDEX: 37.89 KG/M2 | SYSTOLIC BLOOD PRESSURE: 143 MMHG

## 2023-01-18 DIAGNOSIS — N32.81 URGENCY-FREQUENCY SYNDROME: Primary | ICD-10-CM

## 2023-01-18 DIAGNOSIS — N39.41 URGENCY INCONTINENCE: ICD-10-CM

## 2023-01-18 DIAGNOSIS — Z87.440 PERSONAL HISTORY OF URINARY TRACT INFECTION: ICD-10-CM

## 2023-01-18 PROCEDURE — 99214 OFFICE O/P EST MOD 30 MIN: CPT | Performed by: UROLOGY

## 2023-01-18 RX ORDER — TROSPIUM CHLORIDE ER 60 MG/1
60 CAPSULE ORAL EVERY MORNING
Qty: 30 CAPSULE | Refills: 3 | Status: SHIPPED | OUTPATIENT
Start: 2023-01-18 | End: 2023-05-12

## 2023-01-18 ASSESSMENT — PAIN SCALES - GENERAL: PAINLEVEL: EXTREME PAIN (8)

## 2023-01-18 NOTE — LETTER
"1/18/2023       RE: Savanna Rehman  [address]  [facility] [facility] [address]     Dear Colleague,    Thank you for referring your patient, Savanna Rehman, to the [facility]. Please see a copy of my visit note below.    January 18, 2023    Savanna was seen today for urinary frequency.    Diagnoses and all orders for this visit:    Urgency-frequency syndrome  -     trospium (SANCTURA XR) 60 MG CP24 24 hr capsule; Take 1 capsule (60 mg) by mouth every morning    Urgency incontinence  -     trospium (SANCTURA XR) 60 MG CP24 24 hr capsule; Take 1 capsule (60 mg) by mouth every morning    Personal history of urinary tract infection    Discussed intake modification with patient as she is diabetic and drinking lots of full sugar soda    She did not tolerate oxybutynin and unable to afford the mirabegron so will try trospium    RTC 3 months for symptom check and PVR     18 minutes were spent today on the day of the encounter in reviewing the EMR, direct patient care including prescription medications, coordination of care and documentation    Deisy Wilson MD MPH  (she/her/hers)   of Urology  [facility]      Subjective    Here follow up with her daughter.  She was last seen in June.  Has had a lot of dizziness and had a bad fall, hit her head the 27th of December, did not seek medical attention.  Also having a lot of floaters in the eye since that fall    About to have a colonoscopy, doing it under general anesthesia.      Urinary issues are that she is \"peeing like a race horse.\"  Drinking 7 up (2 big bottles) and Dr Pepper (one small can), maybe 24 oz of water.  Has been stable from the UTI standpoint     She denies any changes in health since last visit.  Is here today with her daughter    BP (!) 143/80   Pulse 61   Ht 1.727 m (5' 8\")   Wt 113.4 kg (250 lb)   LMP  (LMP Unknown)   SpO2 " 97%   BMI 38.01 kg/m    GENERAL: healthy, alert and no distress  EYES: Eyes grossly normal to inspection, conjunctivae and sclerae normal  HENT: normal cephalic/atraumatic.  External ears, nose and mouth without ulcers or lesions.  RESP: no audible wheeze, cough, or visible cyanosis.  No visible retractions or increased work of breathing.  Able to speak fully in complete sentences.  NEURO: Cranial nerves grossly intact, mentation intact and speech normal  PSYCH: mentation appears normal, affect normal/bright, judgement and insight intact, normal speech and appearance well-groomed    CC  Patient Care Team:  Patti Suárez MD as PCP - General (Internal Medicine)  Patti Suárez MD as Assigned PCP  Patti Suárez MD as Referring Physician (Internal Medicine)  Deisy Wilson MD as MD (Urology)  Adelaida Liu, RN as Specialty Care Coordinator (Urology)  Deisy Wilson MD as Assigned Surgical Provider  Anjali Rabago MD as Assigned Pulmonology Provider  Jeannette Modi MD as Assigned Sleep Provider  SELF, REFERRED

## 2023-01-18 NOTE — NURSING NOTE
Chief Complaint   Patient presents with     Urinary Frequency     Patient state is no longer taking her medication     Patient her today with Daughter.  Patient is no longer taking medication.  Patient state that she don't like medication is working.           Sonia Espinosa, ELAINEA

## 2023-01-18 NOTE — PATIENT INSTRUCTIONS
Websites with free information:    [employer] patient website: www.voicesforpfd.org    Total Control Program: www.totalcontrolprogram.com    Supplements to prevent UTI to consider  -Probiotics  -Cranberry (for these products let them know a doctor is recommending them)   Ellura: www.myellura.Windowfarms   Theracran HP by [employer] [facility] [address]  -d-mannose 2gm daily  -Vitamin C 500-1000mg twice a day    Be mindful of what fluids your intake.  Encourage water    Minimize fluids after dinner    Try the trospium medication    Return in 3 months for reassessment, sooner if needed    It was a pleasure meeting with you today.  Thank you for allowing me and my team the privilege of caring for you today.  YOU are the reason we are here, and I truly hope we provided you with the excellent service you deserve.  Please let us know if there is anything else we can do for you so that we can be sure you are leaving completely satisfied with your care experience.

## 2023-01-18 NOTE — TELEPHONE ENCOUNTER
See OV 01/17/2023 for approval of 01/09/2023 procedure plan.    Jayleen Oconnor, PharmD BCACP  Anticoagulation Clinical Pharmacist

## 2023-01-18 NOTE — PROGRESS NOTES
"January 18, 2023    Savanna was seen today for urinary frequency.    Diagnoses and all orders for this visit:    Urgency-frequency syndrome  -     trospium (SANCTURA XR) 60 MG CP24 24 hr capsule; Take 1 capsule (60 mg) by mouth every morning    Urgency incontinence  -     trospium (SANCTURA XR) 60 MG CP24 24 hr capsule; Take 1 capsule (60 mg) by mouth every morning    Personal history of urinary tract infection    Discussed intake modification with patient as she is diabetic and drinking lots of full sugar soda    She did not tolerate oxybutynin and unable to afford the mirabegron so will try trospium    RTC 3 months for symptom check and PVR     18 minutes were spent today on the day of the encounter in reviewing the EMR, direct patient care including prescription medications, coordination of care and documentation    Deisy Wilson MD MPH  (she/her/hers)   of Urology  [facility]      Subjective    Here follow up with her daughter.  She was last seen in June.  Has had a lot of dizziness and had a bad fall, hit her head the 27th of December, did not seek medical attention.  Also having a lot of floaters in the eye since that fall    About to have a colonoscopy, doing it under general anesthesia.      Urinary issues are that she is \"peeing like a race horse.\"  Drinking 7 up (2 big bottles) and Dr Pepper (one small can), maybe 24 oz of water.  Has been stable from the UTI standpoint     She denies any changes in health since last visit.  Is here today with her daughter    BP (!) 143/80   Pulse 61   Ht 1.727 m (5' 8\")   Wt 113.4 kg (250 lb)   LMP  (LMP Unknown)   SpO2 97%   BMI 38.01 kg/m    GENERAL: healthy, alert and no distress  EYES: Eyes grossly normal to inspection, conjunctivae and sclerae normal  HENT: normal cephalic/atraumatic.  External ears, nose and mouth without ulcers or lesions.  RESP: no audible wheeze, cough, or visible cyanosis.  No visible retractions or increased " work of breathing.  Able to speak fully in complete sentences.  NEURO: Cranial nerves grossly intact, mentation intact and speech normal  PSYCH: mentation appears normal, affect normal/bright, judgement and insight intact, normal speech and appearance well-groomed    CC  Patient Care Team:  Patti Suárez MD as PCP - General (Internal Medicine)  Patti Suárez MD as Assigned PCP  Patti Suárez MD as Referring Physician (Internal Medicine)  Deisy Wilson MD as MD (Urology)  Adelaida Liu RN as Specialty Care Coordinator (Urology)  Deisy Wilson MD as Assigned Surgical Provider  Anjali Rabago MD as Assigned Pulmonology Provider  Jeannette Modi MD as Assigned Sleep Provider  SELF, REFERRED

## 2023-01-18 NOTE — RESULT ENCOUNTER NOTE
Please let patient know basic metabolic panel (kidney function, electrolytes) shows stable kidney function and electrolytes.      Thanks.

## 2023-01-19 ENCOUNTER — TELEPHONE (OUTPATIENT)
Dept: INTERNAL MEDICINE | Facility: CLINIC | Age: 83
End: 2023-01-19
Payer: COMMERCIAL

## 2023-01-19 RX ORDER — BISACODYL 5 MG
TABLET, DELAYED RELEASE (ENTERIC COATED) ORAL
Qty: 4 TABLET | Refills: 0 | Status: SHIPPED | OUTPATIENT
Start: 2023-01-19 | End: 2023-01-23 | Stop reason: HOSPADM

## 2023-01-19 NOTE — PROGRESS NOTES
Rosa calling from home care regarding procedure plan.  Reviewed the procedure plan as outlined below with home care nurse.  Repeated instructions back to ACN.    JAMI JohnsonN, RN  Anticoagulation Clinic

## 2023-01-19 NOTE — PROGRESS NOTES
Procedure plan from the 2023 telephone message. Calendar is updated with this information. Left message for home care to call back to inform.      Pre-Procedure:  ? Hold warfarin for 5 days, until after procedure startin23   ? No Bridge       Post-Procedure:  ? Resume warfarin dose if okay with provider doing procedure on night of procedure,  PM: 5 mg x 1 then resume home dose  ? Recheck INR ~ 4-5days after resuming warfarin     Mahsa Scott RN    [facility]

## 2023-01-21 ENCOUNTER — TELEPHONE (OUTPATIENT)
Dept: INTERNAL MEDICINE | Facility: CLINIC | Age: 83
End: 2023-01-21
Payer: COMMERCIAL

## 2023-01-21 DIAGNOSIS — G89.29 CHRONIC LOW BACK PAIN, UNSPECIFIED BACK PAIN LATERALITY, UNSPECIFIED WHETHER SCIATICA PRESENT: ICD-10-CM

## 2023-01-21 DIAGNOSIS — M54.2 NECK PAIN: Primary | ICD-10-CM

## 2023-01-21 DIAGNOSIS — M54.50 CHRONIC LOW BACK PAIN, UNSPECIFIED BACK PAIN LATERALITY, UNSPECIFIED WHETHER SCIATICA PRESENT: ICD-10-CM

## 2023-01-21 NOTE — TELEPHONE ENCOUNTER
Reason for Call:  Other call back    Detailed comments: Savanna is calling for updates on home health physical therapy  and neuropathy testing. She has not heard anything and would like these referral set in place as soon as possible since she is in a lot of pain and struggling with movement and being comfortable.    Phone Number Patient can be reached at: Home number on file [phone] (home)    Best Time: any    Can we leave a detailed message on this number? YES    Call taken on 1/21/2023 at 9:45 AM by Gabriela Villarreal

## 2023-01-23 ENCOUNTER — MEDICAL CORRESPONDENCE (OUTPATIENT)
Dept: HEALTH INFORMATION MANAGEMENT | Facility: CLINIC | Age: 83
End: 2023-01-23

## 2023-01-23 ENCOUNTER — TELEPHONE (OUTPATIENT)
Dept: INTERNAL MEDICINE | Facility: CLINIC | Age: 83
End: 2023-01-23
Payer: COMMERCIAL

## 2023-01-23 ENCOUNTER — TELEPHONE (OUTPATIENT)
Dept: GASTROENTEROLOGY | Facility: CLINIC | Age: 83
End: 2023-01-23
Payer: COMMERCIAL

## 2023-01-23 NOTE — TELEPHONE ENCOUNTER
Incoming call from home care nurse Evelin. Evelin states that patient started her hold for her upcoming colonoscopy on 1/21/23 as directed, but canceled the procedure today. Evelin states patient verbalized concerns about not being able to finish the prep, and is fearful that it will make her more weak. Patient is considering rescheduling at this time.    Will have patient resume warfarin today with a boost of 5 mg, and then resume maintenance dose on 1/24/23, recheck INR on 1/26/23.

## 2023-01-23 NOTE — TELEPHONE ENCOUNTER
Caller: Savanna Rehman    Reason for Reschedule/Cancellation (please be detailed, any staff messages or encounters to note?): patient request       Prior to reschedule please review:    Ordering Provider:sarah     Sedation per order:CS    Does patient have any ASC Exclusions, please identify?: y, kristopher      Notes on Cancelled Procedure:    Procedure:Lower Endoscopy [Colonoscopy]     Date: 01/26    [facility]; [address] [place] [address]    Surgeon: nate        Rescheduled: Yes    Procedure: Lower Endoscopy [Colonoscopy]     Date: 03/15/2023    Location:[facility]; [address] [place] [address]    Surgeon: Nate    Sedation Level Scheduled  mac,  Reason for Sedation Level pt request    Prep/Instructions updated and sent: marily

## 2023-01-23 NOTE — TELEPHONE ENCOUNTER
Spoke with patient's home care nurse Raj with [facility] ([phone]).  She has been seeing patient for 7 years and patient does not want to have to switch home care agencies.  They do not offer PT with their agency.  Raj is going to speak to patient and see what she really wants.  We could possibly refer to out patient therapy but patient may not go and cancel appointments.    Regarding neuropathy, there is no neuropathy on problem list.  Advised Raj to have patient schedule an appointment in clinic to discuss this.  BENITA Graham R.N.

## 2023-01-26 ENCOUNTER — NURSE TRIAGE (OUTPATIENT)
Dept: NURSING | Facility: CLINIC | Age: 83
End: 2023-01-26
Payer: COMMERCIAL

## 2023-01-26 ENCOUNTER — ANTICOAGULATION THERAPY VISIT (OUTPATIENT)
Dept: ANTICOAGULATION | Facility: CLINIC | Age: 83
End: 2023-01-26
Payer: COMMERCIAL

## 2023-01-26 DIAGNOSIS — I48.21 PERMANENT ATRIAL FIBRILLATION (H): Primary | ICD-10-CM

## 2023-01-26 DIAGNOSIS — I48.20 CHRONIC ATRIAL FIBRILLATION (H): ICD-10-CM

## 2023-01-26 DIAGNOSIS — Z79.01 LONG TERM (CURRENT) USE OF ANTICOAGULANTS: ICD-10-CM

## 2023-01-26 NOTE — PROGRESS NOTES
"Home care nurse Evelin calls stating she was suppose to see patient today for an INR, however, patient called today and canceled stating that she \"has too much going on right now\". Evelin states she advised patient on how important it is to have INR rechecked today or tomorrow, but patient is declining next INR before 1/31/23.    Of note, patient started holding warfarin for a colonoscopy on 1/21/23, then decided to cancel the colonoscopy. Therefore, patient held warfarin 1/21/23 & 1/22/23. Patient was then instructed to take a boost on 1/23/23.     Will have patient continue to take maintenance dose until next INR on 1/31/23.   "

## 2023-01-26 NOTE — TELEPHONE ENCOUNTER
Fell 12/27/22 and she has had has had a headache since    Now she is wondering if she has COVID.    The front of the head hurts    She has also noticed increased floaters in the eyes and Neuro has told her that she needs to be seen by her eye doctor.    Patient then stated that she had another provider calling and she hung up the phone before further triage could take place.    Roya Henson RN  [facility] Nurse Advisor  9:53 AM  1/26/2023        Reason for Disposition    Caller hangs up    Protocols used: DIFFICULT CALL-A-AH

## 2023-01-27 DIAGNOSIS — Z53.9 DIAGNOSIS NOT YET DEFINED: Primary | ICD-10-CM

## 2023-01-27 PROCEDURE — G0179 MD RECERTIFICATION HHA PT: HCPCS | Performed by: INTERNAL MEDICINE

## 2023-01-27 NOTE — TELEPHONE ENCOUNTER
Patient is calling to ask for a PHYSICAL THERAPY referral for her neck and spine. She said it does not have to be in her home because she can get a ride for this.

## 2023-01-29 ENCOUNTER — TELEPHONE (OUTPATIENT)
Dept: INTERNAL MEDICINE | Facility: CLINIC | Age: 83
End: 2023-01-29
Payer: COMMERCIAL

## 2023-01-29 ENCOUNTER — NURSE TRIAGE (OUTPATIENT)
Dept: NURSING | Facility: CLINIC | Age: 83
End: 2023-01-29
Payer: COMMERCIAL

## 2023-01-29 NOTE — TELEPHONE ENCOUNTER
"Diagnosed with Covid on 8/1/2022 and patient states that she has covid \"long haulers\".  Patient is rambling on and is stating that \"they are telling me I have dememtia\", don't you think I could have covid long haulers?  Patient does not have any symptoms at the time of call. Patient looking for information only. No triage. Patient warm transferred to scheduling to make an appointment.     ANNABEL CLIFFORD RN    Reason for Disposition    Health Information question, no triage required and triager able to answer question    Additional Information    Negative: [1] Caller is not with the adult (patient) AND [2] reporting urgent symptoms    Negative: Lab result questions    Negative: Medication questions    Negative: Caller can't be reached by phone    Negative: Caller has already spoken to PCP or another triager    Negative: RN needs further essential information from caller in order to complete triage    Negative: Requesting regular office appointment    Negative: [1] Caller requesting NON-URGENT health information AND [2] PCP's office is the best resource    Protocols used: INFORMATION ONLY CALL - NO TRIAGE-A-      "

## 2023-01-29 NOTE — TELEPHONE ENCOUNTER
Reason for Call:  Appointment Request    Patient requesting this type of appt:  Office visit, pt had a fall in Dec, and is feeling not very well    Requested provider: Patti Suárez    Reason patient unable to be scheduled: Not within requested timeframe    When does patient want to be seen/preferred time: 3-7 days    Comments: Pt is also concerned about not feeling well from potential long covid    Okay to leave a detailed message?: Yes at Cell number on file:    Telephone Information:   Mobile [phone]       Call taken on 1/29/2023 at 4:14 PM by Gretta Carrillo

## 2023-01-30 NOTE — TELEPHONE ENCOUNTER
"Spoke with patient.  She is concerned if she has \"Covid long haulers\" vs dementia.  C/o difficulty remembering things since diagnosed with covid 7/25/22.  Very concerned.    Appointment scheduled.  "

## 2023-01-30 NOTE — TELEPHONE ENCOUNTER
Patient is concerned that she has not gotten a referral for PT.  She wants an actual name of who to go to.  She is asking if it would be in [place] or [place]. Patient has found a way that she can angela there.  Patient is having severe back pain that she can not keep living in terrible back pain as well as horrible headaches.

## 2023-01-31 ENCOUNTER — ANTICOAGULATION THERAPY VISIT (OUTPATIENT)
Dept: ANTICOAGULATION | Facility: CLINIC | Age: 83
End: 2023-01-31
Payer: COMMERCIAL

## 2023-01-31 DIAGNOSIS — I48.20 CHRONIC ATRIAL FIBRILLATION (H): ICD-10-CM

## 2023-01-31 DIAGNOSIS — I48.21 PERMANENT ATRIAL FIBRILLATION (H): Primary | ICD-10-CM

## 2023-01-31 DIAGNOSIS — Z79.01 LONG TERM (CURRENT) USE OF ANTICOAGULANTS: ICD-10-CM

## 2023-01-31 LAB — INR (EXTERNAL): 1.9 (ref 0.9–1.1)

## 2023-01-31 NOTE — PROGRESS NOTES
ANTICOAGULATION MANAGEMENT     Savanna Rehman 82 year old female is on warfarin with subtherapeutic INR result. (Goal INR 2.0-3.0)    Recent labs: (last 7 days)     01/31/23  1154   INR 1.9*       ASSESSMENT       Source(s): Chart Review and Home Care/Facility Nurse       Warfarin doses taken: Warfarin taken as instructed    Diet: No new diet changes identified    New illness, injury, or hospitalization: No    Medication/supplement changes: Sertraline started on 1/31/23 subsequent INRs may increased. Closer INR monitoring recommended.    Trospium started on 1/31/23 No interaction anticipated    Glipizide stopped on around [place] time no longer affecting the INR    Signs or symptoms of bleeding or clotting: No    Previous INR: Therapeutic last 2(+) visits    Additional findings: None       PLAN     Recommended plan for ongoing change(s) affecting INR     Dosing Instructions: Continue your current warfarin dose with next INR in 1 week       Summary  As of 1/31/2023    Full warfarin instructions:  3.75 mg every Tue; 2.5 mg all other days   Next INR check:  2/7/2023             Telephone call with [facility] home care nurse who agrees to plan and repeated back plan correctly    Orders given to  Homecare nurse/facility to recheck    Education provided:     Interaction IS anticipated between warfarin and sertraline    Plan made per [facility] anticoagulation protocol    Roya Sky RN  Anticoagulation Clinic  1/31/2023    _______________________________________________________________________     Anticoagulation Episode Summary     Current INR goal:  2.0-3.0   TTR:  67.8 % (1 y)   Target end date:  Indefinite   Send INR reminders to:  NIKKI CHASE    Indications    Permanent atrial fibrillation (H) [I48.21]  Chronic atrial fibrillation (H) [I48.20]  Long term (current) use of anticoagulants [Z79.01]           Comments:  Home care          Anticoagulation Care Providers     Provider Role Specialty Phone number     Patti Suárez MD Referring Internal Medicine [phone]

## 2023-02-01 ENCOUNTER — NURSE TRIAGE (OUTPATIENT)
Dept: INTERNAL MEDICINE | Facility: CLINIC | Age: 83
End: 2023-02-01
Payer: COMMERCIAL

## 2023-02-01 NOTE — TELEPHONE ENCOUNTER
S-(situation): Patient calls with lots of worries.    B-(background): Pt fell on 12/27/23. Head aches since then. Multiple phone    A-(assessment): Head aches 8-10 on pain scale. Tylenol takes the edge off. Has a hard time turning her neck since then. Gets dizzy.  Has been off glipizide since the last time she talked with Dr. Lauren about if her glipizide caused her dizziness. BG today was 198, has been consistently 150+.  She is very worried about her health.     R-(recommendations): Pt wants a referral for Physical Therapy order pended. Is wondering if she needs to restart her glipizide.    There have been several messages sent to PCP already.    Yovany Meza RN

## 2023-02-03 ENCOUNTER — TELEPHONE (OUTPATIENT)
Dept: INTERNAL MEDICINE | Facility: CLINIC | Age: 83
End: 2023-02-03
Payer: COMMERCIAL

## 2023-02-03 NOTE — TELEPHONE ENCOUNTER
Blood glucose readings received via fax.   Note the patient has not taken glipizide since around [place] time.  Form in your mailbox to be signed.

## 2023-02-06 ENCOUNTER — ANTICOAGULATION THERAPY VISIT (OUTPATIENT)
Dept: ANTICOAGULATION | Facility: CLINIC | Age: 83
End: 2023-02-06

## 2023-02-06 ENCOUNTER — HOSPITAL ENCOUNTER (OUTPATIENT)
Dept: CT IMAGING | Facility: CLINIC | Age: 83
Discharge: HOME | End: 2023-02-06
Attending: NURSE PRACTITIONER | Admitting: NURSE PRACTITIONER
Payer: COMMERCIAL

## 2023-02-06 ENCOUNTER — OFFICE VISIT (OUTPATIENT)
Dept: PEDIATRICS | Facility: CLINIC | Age: 83
End: 2023-02-06
Payer: COMMERCIAL

## 2023-02-06 ENCOUNTER — TELEPHONE (OUTPATIENT)
Dept: NURSING | Facility: CLINIC | Age: 83
End: 2023-02-06

## 2023-02-06 ENCOUNTER — OFFICE VISIT (OUTPATIENT)
Dept: INTERNAL MEDICINE | Facility: CLINIC | Age: 83
End: 2023-02-06
Payer: COMMERCIAL

## 2023-02-06 VITALS
WEIGHT: 250 LBS | SYSTOLIC BLOOD PRESSURE: 137 MMHG | RESPIRATION RATE: 12 BRPM | OXYGEN SATURATION: 93 % | BODY MASS INDEX: 37.89 KG/M2 | HEART RATE: 81 BPM | HEIGHT: 68 IN | TEMPERATURE: 97.5 F | DIASTOLIC BLOOD PRESSURE: 78 MMHG

## 2023-02-06 VITALS
WEIGHT: 248 LBS | OXYGEN SATURATION: 95 % | SYSTOLIC BLOOD PRESSURE: 119 MMHG | BODY MASS INDEX: 37.71 KG/M2 | DIASTOLIC BLOOD PRESSURE: 90 MMHG | HEART RATE: 82 BPM | TEMPERATURE: 97.9 F | RESPIRATION RATE: 18 BRPM

## 2023-02-06 DIAGNOSIS — R41.82 ALTERED MENTAL STATUS, UNSPECIFIED ALTERED MENTAL STATUS TYPE: Primary | ICD-10-CM

## 2023-02-06 DIAGNOSIS — F41.9 ANXIETY AND DEPRESSION: ICD-10-CM

## 2023-02-06 DIAGNOSIS — M54.2 CERVICAL PAIN: ICD-10-CM

## 2023-02-06 DIAGNOSIS — G89.29 CHRONIC BILATERAL LOW BACK PAIN WITH LEFT-SIDED SCIATICA: ICD-10-CM

## 2023-02-06 DIAGNOSIS — Z79.01 LONG TERM (CURRENT) USE OF ANTICOAGULANTS: ICD-10-CM

## 2023-02-06 DIAGNOSIS — R41.3 MEMORY CHANGES: Primary | ICD-10-CM
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
## 2023-02-06 DIAGNOSIS — U09.9 POST COVID-19 CONDITION, UNSPECIFIED: ICD-10-CM

## 2023-02-06 DIAGNOSIS — R51.9 ACUTE INTRACTABLE HEADACHE, UNSPECIFIED HEADACHE TYPE: ICD-10-CM

## 2023-02-06 DIAGNOSIS — R41.3 POOR SHORT TERM MEMORY: ICD-10-CM

## 2023-02-06 DIAGNOSIS — I48.20 CHRONIC ATRIAL FIBRILLATION (H): ICD-10-CM

## 2023-02-06 DIAGNOSIS — I48.21 PERMANENT ATRIAL FIBRILLATION (H): Primary | ICD-10-CM

## 2023-02-06 DIAGNOSIS — F32.A ANXIETY AND DEPRESSION: ICD-10-CM

## 2023-02-06 DIAGNOSIS — M54.42 CHRONIC BILATERAL LOW BACK PAIN WITH LEFT-SIDED SCIATICA: ICD-10-CM

## 2023-02-06 DIAGNOSIS — R35.0 URINARY FREQUENCY: ICD-10-CM

## 2023-02-06 DIAGNOSIS — R41.82 ALTERED MENTAL STATUS, UNSPECIFIED ALTERED MENTAL STATUS TYPE: ICD-10-CM

## 2023-02-06 DIAGNOSIS — E11.65 TYPE 2 DIABETES MELLITUS WITH HYPERGLYCEMIA, WITHOUT LONG-TERM CURRENT USE OF INSULIN (H): ICD-10-CM

## 2023-02-06 DIAGNOSIS — N30.01 ACUTE CYSTITIS WITH HEMATURIA: ICD-10-CM

## 2023-02-06 DIAGNOSIS — R73.9 HYPERGLYCEMIA: ICD-10-CM

## 2023-02-06 DIAGNOSIS — I67.9 SMALL VESSEL DISEASE, CEREBROVASCULAR: ICD-10-CM

## 2023-02-06 LAB
ANION GAP SERPL CALCULATED.3IONS-SCNC: 10 MMOL/L (ref 7–15)
BASOPHILS # BLD AUTO: 0 10E3/UL (ref 0–0.2)
BASOPHILS NFR BLD AUTO: 1 %
BUN SERPL-MCNC: 22.1 MG/DL (ref 8–23)
CALCIUM SERPL-MCNC: 9.9 MG/DL (ref 8.8–10.2)
CHLORIDE SERPL-SCNC: 98 MMOL/L (ref 98–107)
CREAT SERPL-MCNC: 0.88 MG/DL (ref 0.51–0.95)
DEPRECATED HCO3 PLAS-SCNC: 30 MMOL/L (ref 22–29)
EOSINOPHIL # BLD AUTO: 0.2 10E3/UL (ref 0–0.7)
EOSINOPHIL NFR BLD AUTO: 3 %
ERYTHROCYTE [DISTWIDTH] IN BLOOD BY AUTOMATED COUNT: 12.9 % (ref 10–15)
GFR SERPL CREATININE-BSD FRML MDRD: 66 ML/MIN/1.73M2
GLUCOSE SERPL-MCNC: 160 MG/DL (ref 70–99)
HBA1C MFR BLD: 7.3 %
HCT VFR BLD AUTO: 44.9 % (ref 35–47)
HGB BLD-MCNC: 14.9 G/DL (ref 11.7–15.7)
IMM GRANULOCYTES # BLD: 0 10E3/UL
IMM GRANULOCYTES NFR BLD: 1 %
INR PPP: 1.52 (ref 0.85–1.15)
LYMPHOCYTES # BLD AUTO: 1.9 10E3/UL (ref 0.8–5.3)
LYMPHOCYTES NFR BLD AUTO: 31 %
MCH RBC QN AUTO: 30.8 PG (ref 26.5–33)
MCHC RBC AUTO-ENTMCNC: 33.2 G/DL (ref 31.5–36.5)
MCV RBC AUTO: 93 FL (ref 78–100)
MONOCYTES # BLD AUTO: 0.7 10E3/UL (ref 0–1.3)
MONOCYTES NFR BLD AUTO: 12 %
NEUTROPHILS # BLD AUTO: 3.2 10E3/UL (ref 1.6–8.3)
NEUTROPHILS NFR BLD AUTO: 52 %
NRBC # BLD AUTO: 0 10E3/UL
NRBC BLD AUTO-RTO: 0 /100
PLATELET # BLD AUTO: 183 10E3/UL (ref 150–450)
POTASSIUM SERPL-SCNC: 4.4 MMOL/L (ref 3.4–5.3)
RBC # BLD AUTO: 4.83 10E6/UL (ref 3.8–5.2)
SODIUM SERPL-SCNC: 138 MMOL/L (ref 136–145)
WBC # BLD AUTO: 6.1 10E3/UL (ref 4–11)

## 2023-02-06 PROCEDURE — 99207 REFERRAL TO ACUTE AND DIAGNOSTIC SERVICES: CPT

## 2023-02-06 PROCEDURE — 80048 BASIC METABOLIC PNL TOTAL CA: CPT | Performed by: NURSE PRACTITIONER

## 2023-02-06 PROCEDURE — 83036 HEMOGLOBIN GLYCOSYLATED A1C: CPT | Performed by: NURSE PRACTITIONER

## 2023-02-06 PROCEDURE — 99215 OFFICE O/P EST HI 40 MIN: CPT | Performed by: NURSE PRACTITIONER

## 2023-02-06 PROCEDURE — 99417 PROLNG OP E/M EACH 15 MIN: CPT | Performed by: NURSE PRACTITIONER

## 2023-02-06 PROCEDURE — 85025 COMPLETE CBC W/AUTO DIFF WBC: CPT | Performed by: NURSE PRACTITIONER

## 2023-02-06 PROCEDURE — 85610 PROTHROMBIN TIME: CPT | Performed by: NURSE PRACTITIONER

## 2023-02-06 PROCEDURE — 70450 CT HEAD/BRAIN W/O DYE: CPT

## 2023-02-06 PROCEDURE — 36415 COLL VENOUS BLD VENIPUNCTURE: CPT | Performed by: NURSE PRACTITIONER

## 2023-02-06 RX ORDER — TROSPIUM CHLORIDE ER 60 MG/1
60 CAPSULE ORAL EVERY MORNING
COMMUNITY
End: 2023-02-06

## 2023-02-06 RX ORDER — BUTALBITAL, ASPIRIN, AND CAFFEINE 325; 50; 40 MG/1; MG/1; MG/1
CAPSULE ORAL
Qty: 30 CAPSULE | Refills: 0 | Status: CANCELLED | OUTPATIENT
Start: 2023-02-06

## 2023-02-06 RX ORDER — SERTRALINE HYDROCHLORIDE 25 MG/1
25 TABLET, FILM COATED ORAL DAILY
COMMUNITY
End: 2023-02-06

## 2023-02-06 ASSESSMENT — ANXIETY QUESTIONNAIRES
3. WORRYING TOO MUCH ABOUT DIFFERENT THINGS: MORE THAN HALF THE DAYS
1. FEELING NERVOUS, ANXIOUS, OR ON EDGE: MORE THAN HALF THE DAYS
GAD7 TOTAL SCORE: 13
8. IF YOU CHECKED OFF ANY PROBLEMS, HOW DIFFICULT HAVE THESE MADE IT FOR YOU TO DO YOUR WORK, TAKE CARE OF THINGS AT HOME, OR GET ALONG WITH OTHER PEOPLE?: SOMEWHAT DIFFICULT
2. NOT BEING ABLE TO STOP OR CONTROL WORRYING: NEARLY EVERY DAY
7. FEELING AFRAID AS IF SOMETHING AWFUL MIGHT HAPPEN: SEVERAL DAYS
GAD7 TOTAL SCORE: 13
7. FEELING AFRAID AS IF SOMETHING AWFUL MIGHT HAPPEN: SEVERAL DAYS
4. TROUBLE RELAXING: MORE THAN HALF THE DAYS
5. BEING SO RESTLESS THAT IT IS HARD TO SIT STILL: SEVERAL DAYS
GAD7 TOTAL SCORE: 13
6. BECOMING EASILY ANNOYED OR IRRITABLE: MORE THAN HALF THE DAYS
IF YOU CHECKED OFF ANY PROBLEMS ON THIS QUESTIONNAIRE, HOW DIFFICULT HAVE THESE PROBLEMS MADE IT FOR YOU TO DO YOUR WORK, TAKE CARE OF THINGS AT HOME, OR GET ALONG WITH OTHER PEOPLE: SOMEWHAT DIFFICULT

## 2023-02-06 ASSESSMENT — PATIENT HEALTH QUESTIONNAIRE - PHQ9
SUM OF ALL RESPONSES TO PHQ QUESTIONS 1-9: 15
SUM OF ALL RESPONSES TO PHQ QUESTIONS 1-9: 15
10. IF YOU CHECKED OFF ANY PROBLEMS, HOW DIFFICULT HAVE THESE PROBLEMS MADE IT FOR YOU TO DO YOUR WORK, TAKE CARE OF THINGS AT HOME, OR GET ALONG WITH OTHER PEOPLE: SOMEWHAT DIFFICULT

## 2023-02-06 NOTE — PROGRESS NOTES
Assessment & Plan     Memory changes  Patient has noticed since having COVID in July but she feels that her memory has gotten significantly worse.  Her friend that accompanies her thinks that her memory has not been great previously, but does notice a difference since having COVID.  I will refer her to post-COVID clinic, as well as neuropsych for evaluation.  - Adult Post Covid Clinic Referral; Future  - Adult Neuropsychology Referral; Future    Urinary frequency  Patient complains of urinary frequency, will check UA    UA appears positive will send treatment with cefdinir 300 mg twice daily  - UA with Microscopic reflex to Culture - lab collect; Future    Chronic bilateral low back pain with left-sided sciatica  Patient request PT    Cervical pain  PT referral placed  - Physical Therapy Referral; Future    Acute intractable headache, unspecified headache type  Patient states she has been having a headache since she fell about 6 weeks ago on the front of her head that has progressively been getting worse and is accompanied by some vision changes.  Will refer to ADS for CT.    Post covid-19 condition, unspecified  COVID clinic referral  - Adult Post Covid Clinic Referral; Future    Anxiety and depression  Increase Zoloft to 50 mg after taking 25 mg for 1 week per Dr. Perkins's last note  - sertraline (ZOLOFT) 50 MG tablet; Take 1 tablet (50 mg) by mouth daily    Fall, initial encounter  Referral to ADS, fall was about 6 weeks ago EMS came to the home, patient declined evaluation at this time.  Patient has had headaches since that has progressively been getting worse and better accompanied with vision changes  - Referral to Acute and Diagnostic Services (Day of diagnostic / First order acute); Future    Type 2 diabetes mellitus with hyperglycemia, without long-term current use of insulin (H)  We will check A1c, patient does not take any medication for diabetes currently.  A1c 7.3, I believe that a goal of less  "than 8 is appropriate for this patient.  We will not start medication at this time  - Hemoglobin A1c; Future       BMI:   Estimated body mass index is 38.01 kg/m  as calculated from the following:    Height as of this encounter: 1.727 m (5' 8\").    Weight as of this encounter: 113.4 kg (250 lb).     Depression Screening Follow Up    PHQ 2/6/2023   PHQ-9 Total Score 15   Q9: Thoughts of better off dead/self-harm past 2 weeks Not at all     Return for ADS at 230.    Sushant Escobar NP  [facility]    Mamadou Corrales is a 82 year old accompanied by her friend, presenting for the following health issues:  RECHECK (She wants to check to see if she is having covid long haulers or if she is having dementia.)      History of Present Illness       Mental Health Follow-up:  Patient presents to follow-up on Depression & Anxiety.Patient's depression since last visit has been:  Worse  The patient is not having other symptoms associated with depression.  Patient's anxiety since last visit has been:  Medium  The patient is not having other symptoms associated with anxiety.  Any significant life events: health concerns  Patient is feeling anxious or having panic attacks.  Patient has no concerns about alcohol or drug use.    She eats 0-1 servings of fruits and vegetables daily.She consumes 0 sweetened beverage(s) daily.She exercises with enough effort to increase her heart rate 9 or less minutes per day.  She exercises with enough effort to increase her heart rate 3 or less days per week.   She is taking medications regularly.    Today's PHQ-9         PHQ-9 Total Score: 15    PHQ-9 Q9 Thoughts of better off dead/self-harm past 2 weeks :   Not at all    How difficult have these problems made it for you to do your work, take care of things at home, or get along with other people: Somewhat difficult  Today's LAURIE-7 Score: 13       Last time she had Covid was July 25. She feels that her mind is not what it " "should be since then.    Has not taken glipizide in a long while.     Has been taking     Fall in December where she was evaluated by paramedics.     Fall on December 27. Where she hit the front of her head. EMS came but she did     Review of Systems   Constitutional, HEENT, cardiovascular, pulmonary, GI, , musculoskeletal, neuro, skin, endocrine and psych systems are negative, except as otherwise noted.      Objective    /78 (BP Location: Left arm, Patient Position: Sitting, Cuff Size: Adult Regular)   Pulse 81   Temp 97.5  F (36.4  C) (Tympanic)   Resp 12   Ht 1.727 m (5' 8\")   Wt 113.4 kg (250 lb)   LMP  (LMP Unknown)   SpO2 93%   BMI 38.01 kg/m    Body mass index is 38.01 kg/m .  Physical Exam   GENERAL: alert and no distress  EYES: Eyes grossly normal to inspection, PERRL and conjunctivae and sclerae normal  HENT: ear canals and TM's normal, nose and mouth without ulcers or lesions  NECK: no adenopathy, no asymmetry, masses, or scars and thyroid normal to palpation  RESP: lungs clear to auscultation - no rales, rhonchi or wheezes  CV: regular rate and rhythm, normal S1 S2, no S3 or S4, no murmur, click or rub, no peripheral edema and peripheral pulses strong  ABDOMEN: soft, nontender, no hepatosplenomegaly, no masses and bowel sounds normal  MS: no gross musculoskeletal defects noted, no edema  SKIN: no suspicious lesions or rashes  NEURO: Normal strength and tone, mentation intact and speech normal  PSYCH: mentation appears normal, affect normal/bright    "

## 2023-02-06 NOTE — PATIENT INSTRUCTIONS
Follow-up with Sushant Matos in 1 to 2 weeks to discuss lab results.    Make appointment with your ophthalmologist to have your eyes checked.  If you have any acute changes in your vision please go to the emergency room immediately.    Make appointment with neurologist per Sushant Matos's orders.

## 2023-02-06 NOTE — PROGRESS NOTES
ANTICOAGULATION MANAGEMENT     Savanna Rehman 82 year old female is on warfarin with subtherapeutic INR result. (Goal INR 2.0-3.0)    Recent labs: (last 7 days)     02/07/23  1300   INR 1.6*       ASSESSMENT       Source(s): Chart Review and Home Care/Facility Nurse       Warfarin doses taken: Warfarin taken as instructed    Diet: No new diet changes identified    New illness, injury, or hospitalization: No    Medication/supplement changes: None noted    Signs or symptoms of bleeding or clotting: No    Previous INR: Subtherapeutic    Additional findings: patient did not take booster dose last night as directed       PLAN     Recommended plan for no diet, medication or health factor changes affecting INR     Dosing Instructions: Increase your warfarin dose (13.3% change) with next INR in 1 week       Summary  As of 2/6/2023    Full warfarin instructions:  3.75 mg every Tue, Thu, Sat; 2.5 mg all other days   Next INR check:  2/14/2023             Telephone call with [facility] home care nurse who verbalizes understanding and agrees to plan    Orders given to  Homecare nurse/facility to recheck    Education provided:     Please call back if any changes to your diet, medications or how you've been taking warfarin    Plan made per [facility] anticoagulation protocol    Roya Sky, RN  Anticoagulation Clinic  2/7/2023    _______________________________________________________________________     Anticoagulation Episode Summary     Current INR goal:  2.0-3.0   TTR:  67.8 % (1 y)   Target end date:  Indefinite   Send INR reminders to:  NIKKI CHASE    Indications    Permanent atrial fibrillation (H) [I48.21]  Chronic atrial fibrillation (H) [I48.20]  Long term (current) use of anticoagulants [Z79.01]           Comments:  Home care          Anticoagulation Care Providers     Provider Role Specialty Phone number    Patti Suárez MD Referring Internal Medicine [phone]        ANTICOAGULATION  MANAGEMENT     Savanna Rehman 82 year old female is on warfarin with subtherapeutic INR result. (Goal INR 2.0-3.0)    Recent labs: (last 7 days)     02/06/23  1519   INR 1.52*       ASSESSMENT       Source(s): Chart Review    Previous INR was Subtherapeutic    Medication, diet, health changes since last INR chart reviewed; none identified           PLAN     Unable to reach Savanna today.    Left message to take a booster dose of warfarin,  3.75 mg tonight. Request call back for assessment.   Patient does have home care and per last note are due to see patient tomorrow 2/7/23.    Follow up required to discuss out of range result     Roya Sky RN  Anticoagulation Clinic  2/6/2023

## 2023-02-06 NOTE — PROGRESS NOTES
Chief Complaint   Patient presents with     Fall     Fall on 12/28/22, hit head, denies losing consciousness, JOHNSON since fall         ICD-10-CM    1. Altered mental status, unspecified altered mental status type  R41.82 Basic metabolic panel     CBC with platelets differential     CT Head w/o Contrast     Basic metabolic panel     CBC with platelets differential      2. Fall, initial encounter  W19.XXXA Referral to Acute and Diagnostic Services (Day of diagnostic / First order acute)     Basic metabolic panel     CBC with platelets differential     INR     CT Head w/o Contrast     Basic metabolic panel     CBC with platelets differential     INR      3. Poor short term memory  R41.3 CT Head w/o Contrast      4. Chronic atrial fibrillation (H)  I48.20 INR     INR      5. Long term (current) use of anticoagulants  Z79.01 INR     INR      6. Small vessel disease, cerebrovascular  I67.9       7. Hyperglycemia  R73.9 Hemoglobin A1c     Hemoglobin A1c      8. Urinary frequency  R35.0 UA with Microscopic reflex to Culture - lab collect      9. Type 2 diabetes mellitus with hyperglycemia, without long-term current use of insulin (H)  E11.65       No intracranial pathology is found that would explain the changes in her mental status since her fall.  Patient to see neurology    She was unable to collect a urine while here.  She was sent home with the supplies and instructions to collect at home tomorrow morning and have her home cares nurse return to the lab.    Recheck INR according to the anticoagulation clinic instructions.    Hemoglobin A1c returned elevated in the diabetic range.  She will follow-up with her primary care provider regarding these results.  Referral to neurology has already been entered.    79 minutes spent on the date of the encounter doing chart review, history and exam, documentation and further activities per the note    Medical Decision Making  Differential diagnosis for patient's with altered mental  status includes but is not limited to: Metabolic causes (I.  E.,  Hyper/hyponatremia, hyper/hypoglycemia, hypercalcemia, hyper/hypothyroidism, hypoxia/hypercapnia, hepatic encephalopathy, uremic encephalopathy, drug intoxication/withdrawal, alcohol intoxication or withdrawal, Warnicke encephalopathy), structural lesions (primary or metastatic tumor, intracranial hemorrhage, infection), CVA, TIA, meningitis, encephalitis, seizures, postictal state, hypertensive encephalopathy, vasculitis, arrhythmias, heart failure, endocarditis.    CT of the head without contrast:  IMPRESSION: Diffuse cerebral volume loss and cerebral white matter  changes consistent with chronic small vessel ischemic disease. No  evidence for acute intracranial pathology.      Results for orders placed or performed in visit on 02/06/23 (from the past 24 hour(s))   Basic metabolic panel   Result Value Ref Range    Sodium 138 136 - 145 mmol/L    Potassium 4.4 3.4 - 5.3 mmol/L    Chloride 98 98 - 107 mmol/L    Carbon Dioxide (CO2) 30 (H) 22 - 29 mmol/L    Anion Gap 10 7 - 15 mmol/L    Urea Nitrogen 22.1 8.0 - 23.0 mg/dL    Creatinine 0.88 0.51 - 0.95 mg/dL    Calcium 9.9 8.8 - 10.2 mg/dL    Glucose 160 (H) 70 - 99 mg/dL    GFR Estimate 66 >60 mL/min/1.73m2   CBC with platelets differential    Narrative    The following orders were created for panel order CBC with platelets differential.  Procedure                               Abnormality         Status                     ---------                               -----------         ------                     CBC with platelets and d...[[id number]]                      Final result                 Please view results for these tests on the individual orders.   INR   Result Value Ref Range    INR 1.52 (H) 0.85 - 1.15   CBC with platelets and differential   Result Value Ref Range    WBC Count 6.1 4.0 - 11.0 10e3/uL    RBC Count 4.83 3.80 - 5.20 10e6/uL    Hemoglobin 14.9 11.7 - 15.7 g/dL    Hematocrit  "44.9 35.0 - 47.0 %    MCV 93 78 - 100 fL    MCH 30.8 26.5 - 33.0 pg    MCHC 33.2 31.5 - 36.5 g/dL    RDW 12.9 10.0 - 15.0 %    Platelet Count 183 150 - 450 10e3/uL    % Neutrophils 52 %    % Lymphocytes 31 %    % Monocytes 12 %    % Eosinophils 3 %    % Basophils 1 %    % Immature Granulocytes 1 %    NRBCs per 100 WBC 0 <1 /100    Absolute Neutrophils 3.2 1.6 - 8.3 10e3/uL    Absolute Lymphocytes 1.9 0.8 - 5.3 10e3/uL    Absolute Monocytes 0.7 0.0 - 1.3 10e3/uL    Absolute Eosinophils 0.2 0.0 - 0.7 10e3/uL    Absolute Basophils 0.0 0.0 - 0.2 10e3/uL    Absolute Immature Granulocytes 0.0 <=0.4 10e3/uL    Absolute NRBCs 0.0 10e3/uL   Hemoglobin A1c   Result Value Ref Range    Hemoglobin A1C 7.3 (H) <5.7 %     *Note: Due to a large number of results and/or encounters for the requested time period, some results have not been displayed. A complete set of results can be found in Results Review.       Mamadou Corrales is a 82 year old, presenting for the following health issues:  Fall (Fall on 12/28/22, hit head, denies losing consciousness, JOHNSON since fall), has also felt off balance, has difficulty thinking things through, has had some intermittent visual changes on the left, and has noticed her short-term memory is becoming poor    HPI     Headache  Onset/Duration: since fall on 12/28/23  Description  Location: bilateral in the frontal area, bilateral in the temporal area  Character: dull pain  Frequency: constant  Duration:  constant  Wake with headaches: YES  Able to do daily activities when headache present: YES  Intensity:  7/10  Progression of Symptoms:  same  Accompanying signs and symptoms  Stiff neck: YES  Neck or upper back pain:  YES  Sinus or URI symptoms: YES-  running nose in the AM  Fever: no  Nausea or vomiting: no   Dizziness: YES-takes medication for this.  Numbness/tingling: no  Weakness: YES  Visual changes: YES-notes \"floaters\" and left eye is \"foggy\" intermittently.  She has had floaters on and off " "for months as well as the eye fogginess  History  Head trauma: YES- fall, hit head, denies losing consciousness   Family history of migraines: no  Daily pain medication use: no  Previous tests for headaches: no  Neurologist evaluation: no   Precipitating or Alleviating factors            Does light make it worse: YES- \"glints of light\" or reflective light           Does sound make it worse: no           Does sleep help:YES  Therapies tried and outcome: Fiorinal for Migraines-this helps reduce pain only    ROS: 10 point ROS neg other than the symptoms noted above in the HPI.       Objective    BP (!) 119/90 (BP Location: Left arm, Patient Position: Chair, Cuff Size: Adult Large)   Pulse 82   Temp 97.9  F (36.6  C) (Oral)   Resp 18   Wt 112.5 kg (248 lb)   LMP  (LMP Unknown)   SpO2 95%   BMI 37.71 kg/m    Nurses notes and VS have been reviewed.    Physical Exam       GENERAL APPEARANCE: healthy appearing, alert     EYES: PERRL, EOMI, sclera non-icteric     HENT: oral exam benign, mucus membranes intact, without ulcers or lesions     NECK: no adenopathy or asymmetry, thyroid normal to palpation     RESP: lungs clear to auscultation - no rales, rhonchi or wheezes     CV: regular rates and rhythm, no murmurs, rubs, or gallop     ABDOMEN:  soft, nontender, no HSM or masses and bowel sounds normal     MS: extremities normal- no gross deformities noted; normal muscle tone.     SKIN: no suspicious lesions or rashes     NEURO: gait abnormal, off balance, oriented times 3, rapid alternating movements slowed, proprioception decreased and weakness of all extremities, difficulty with processing is present as is short-term memory loss     PSYCH: normal thought process; no significant mood disturbance    Patient Instructions   Follow-up with Sushant Matos in 1 to 2 weeks to discuss lab results.    Make appointment with your ophthalmologist to have your eyes checked.  If you have any acute changes in your vision please go to " the emergency room immediately.    Make appointment with neurologist per Sushant Matos's orders.      BATSHEVA Ellison, CNP  [facility] Provider    The use of Dragon/KeyEffx dictation services may have been used to construct the content in this note; any grammatical or spelling errors are non-intentional. Please contact the author of this note directly if you are in need of any clarification.

## 2023-02-06 NOTE — PATIENT INSTRUCTIONS
Start taking 50mg zoloft after one week of the 25mg     Physical therapy referral in     UA and a1c is in for lab after you are done with ADS    ADS at 230 PM

## 2023-02-07 LAB
ALBUMIN UR-MCNC: NEGATIVE MG/DL
APPEARANCE UR: ABNORMAL
BACTERIA #/AREA URNS HPF: ABNORMAL /HPF
BILIRUB UR QL STRIP: NEGATIVE
COLOR UR AUTO: YELLOW
GLUCOSE UR STRIP-MCNC: NEGATIVE MG/DL
HGB UR QL STRIP: ABNORMAL
INR (EXTERNAL): 1.6 (ref 0.9–1.1)
KETONES UR STRIP-MCNC: NEGATIVE MG/DL
LEUKOCYTE ESTERASE UR QL STRIP: ABNORMAL
NITRATE UR QL: POSITIVE
PH UR STRIP: 5.5 [PH] (ref 5–7)
RBC #/AREA URNS AUTO: ABNORMAL /HPF
SP GR UR STRIP: 1.02 (ref 1–1.03)
UROBILINOGEN UR STRIP-ACNC: 0.2 E.U./DL
WBC #/AREA URNS AUTO: >100 /HPF
WBC CLUMPS #/AREA URNS HPF: PRESENT /HPF

## 2023-02-07 PROCEDURE — 87186 SC STD MICRODIL/AGAR DIL: CPT | Performed by: NURSE PRACTITIONER

## 2023-02-07 PROCEDURE — 81001 URINALYSIS AUTO W/SCOPE: CPT | Performed by: NURSE PRACTITIONER

## 2023-02-07 PROCEDURE — 87086 URINE CULTURE/COLONY COUNT: CPT | Performed by: NURSE PRACTITIONER

## 2023-02-07 NOTE — TELEPHONE ENCOUNTER
"Patient calling back in.  She doesn't remember what the instructions from the previous call were and reports \"what is my number today\".  Advised that she is supposed to take 3.75mg.      Patient said that she was instructed my home care RN to take only 2.5mg tonight, as there was \"confusion\".  Patient would prefer not to take the 3.75mg tonight and wait for her home care RN To get clarification tomorrow.     Please Call to Home Care RN with instructions, as patient is having some memory issues and was in clinic today for those Monday.    Roya Orozco, RN on 2/6/2023 at 10:46 PM             "

## 2023-02-07 NOTE — TELEPHONE ENCOUNTER
Triage Call:    Caller: Patient  Patient saw a provider who did an INR on her today and she is wondering what the results are.  She has a home health nurse coming tomorrow to do the same exact thing.  Patient wasn't aware that the anticoagulation clinic could see the labs she had drawn.       Per anticoagulation note, patient is to take a booster dose of 3.75mg tonight.  Unsure if that is in addition to regular dose, as the note is not clear about that . Unable to finish conversation with patient, as she had to hang up the phone quick to speak with her nurse on the other line.  She is still going to have her come and do her INR level today.      Protocol Recommended Disposition: Contact clinic in the morning.    Routing note to anticoagulation clinic.      Caller verbalized understanding of instructions and questions answered.      Roya Orozco RN on 2/6/2023 at 8:39 PM

## 2023-02-08 ENCOUNTER — VIRTUAL VISIT (OUTPATIENT)
Dept: INTERNAL MEDICINE | Facility: CLINIC | Age: 83
End: 2023-02-08
Payer: COMMERCIAL

## 2023-02-08 ENCOUNTER — TELEPHONE (OUTPATIENT)
Dept: INTERNAL MEDICINE | Facility: CLINIC | Age: 83
End: 2023-02-08

## 2023-02-08 DIAGNOSIS — N30.01 ACUTE CYSTITIS WITH HEMATURIA: Primary | ICD-10-CM

## 2023-02-08 DIAGNOSIS — E11.22 TYPE 2 DIABETES MELLITUS WITH STAGE 3A CHRONIC KIDNEY DISEASE, WITHOUT LONG-TERM CURRENT USE OF INSULIN (H): ICD-10-CM

## 2023-02-08 DIAGNOSIS — E11.42 DIABETIC POLYNEUROPATHY ASSOCIATED WITH TYPE 2 DIABETES MELLITUS (H): ICD-10-CM

## 2023-02-08 DIAGNOSIS — N18.31 TYPE 2 DIABETES MELLITUS WITH STAGE 3A CHRONIC KIDNEY DISEASE, WITHOUT LONG-TERM CURRENT USE OF INSULIN (H): ICD-10-CM

## 2023-02-08 LAB — BACTERIA UR CULT: ABNORMAL

## 2023-02-08 PROCEDURE — 99214 OFFICE O/P EST MOD 30 MIN: CPT | Mod: 93

## 2023-02-08 RX ORDER — CEFDINIR 300 MG/1
300 CAPSULE ORAL 2 TIMES DAILY
Qty: 14 CAPSULE | Refills: 0 | Status: SHIPPED | OUTPATIENT
Start: 2023-02-08 | End: 2023-03-08

## 2023-02-08 RX ORDER — GLIPIZIDE 2.5 MG/1
2.5 TABLET, EXTENDED RELEASE ORAL 2 TIMES DAILY
Qty: 180 TABLET | Refills: 3 | Status: SHIPPED | OUTPATIENT
Start: 2023-02-08 | End: 2024-03-26

## 2023-02-08 ASSESSMENT — ANXIETY QUESTIONNAIRES: GAD7 TOTAL SCORE: 13

## 2023-02-08 ASSESSMENT — PATIENT HEALTH QUESTIONNAIRE - PHQ9
SUM OF ALL RESPONSES TO PHQ QUESTIONS 1-9: 15
10. IF YOU CHECKED OFF ANY PROBLEMS, HOW DIFFICULT HAVE THESE PROBLEMS MADE IT FOR YOU TO DO YOUR WORK, TAKE CARE OF THINGS AT HOME, OR GET ALONG WITH OTHER PEOPLE: SOMEWHAT DIFFICULT

## 2023-02-08 NOTE — TELEPHONE ENCOUNTER
Huddled with Dr Lauren. She states OK to restart Glipizide 2.5 mg twice daily.     Call back to pt and advised. She agrees.

## 2023-02-08 NOTE — PROGRESS NOTES
"Savanna is a 82 year old who is being evaluated via a billable telephone visit.      What phone number would you like to be contacted at? [phone], Please also call her daughter Roya [phone] to join the visit.    How would you like to obtain your AVS? Adamaris  Distant Location (provider location):  On-site    Assessment & Plan     Acute cystitis with hematuria  Patient spoke with ID nurse and they recommended waiting till culture comes back before treating.  Waiting on culture results and then will send appropriate antibiotic.    Type 2 diabetes mellitus with stage 3a chronic kidney disease, without long-term current use of insulin (H)  Discussed with patient-A1c was 7.3.  Recommend that she restart glipizide 2.5 mg twice daily.  Recheck A1c in about 3 months       BMI:   Estimated body mass index is 37.71 kg/m  as calculated from the following:    Height as of 2/6/23: 1.727 m (5' 8\").    Weight as of 2/6/23: 112.5 kg (248 lb).     No follow-ups on file.    Sushant Escobar NP  [facility]    Mamadou Corrales is a 82 year old accompanied by her Roya and Savanna , presenting for the following health issues:  RECHECK (Follow up on lab results. Done yesterday.)      HPI   Patient and daughter following up with lab results from appointment on Monday    Review of Systems   CONSTITUTIONAL: NEGATIVE for fever, chills, change in weight  ENT/MOUTH: NEGATIVE for ear, mouth and throat problems  RESP: NEGATIVE for significant cough or SOB  CV: NEGATIVE for chest pain, palpitations or peripheral edema      Objective           Vitals:  No vitals were obtained today due to virtual visit.    Physical Exam   healthy, alert and no distress  PSYCH: Alert and oriented times 3; coherent speech, normal   rate and volume, able to articulate logical thoughts, able   to abstract reason, no tangential thoughts, no hallucinations   or delusions  Her affect is normal  RESP: No cough, no audible " wheezing, able to talk in full sentences  Remainder of exam unable to be completed due to telephone visits        Phone call duration: 15 minutes

## 2023-02-08 NOTE — TELEPHONE ENCOUNTER
Pt states her fasting BS this AM was 219.    She has Glipizide 2.5 mg at home.     She is asking if should restart 2 a day?    She has telephone visit this afternoon, but wants to know before her appt.     Feels dizzy still so thinking it might be her Diabetes.      BS readings, 188-219.     Please advise.

## 2023-02-14 ENCOUNTER — TELEPHONE (OUTPATIENT)
Dept: ANTICOAGULATION | Facility: CLINIC | Age: 83
End: 2023-02-14

## 2023-02-14 NOTE — TELEPHONE ENCOUNTER
"Home care nurse Rosa left a message on voice mail informing [facility] the patient requested home care see her tomorrow to check INR because she has \"too much of a headache today\".      Home care will go tomorrow 2/15/23 to check INR.    EMILY Johnson, RN  Anticoagulation Clinic    "

## 2023-02-15 ENCOUNTER — ANTICOAGULATION THERAPY VISIT (OUTPATIENT)
Dept: ANTICOAGULATION | Facility: CLINIC | Age: 83
End: 2023-02-15
Payer: COMMERCIAL

## 2023-02-15 DIAGNOSIS — Z79.01 LONG TERM (CURRENT) USE OF ANTICOAGULANTS: ICD-10-CM

## 2023-02-15 DIAGNOSIS — I48.21 PERMANENT ATRIAL FIBRILLATION (H): Primary | ICD-10-CM

## 2023-02-15 DIAGNOSIS — I48.20 CHRONIC ATRIAL FIBRILLATION (H): ICD-10-CM

## 2023-02-15 LAB — INR (EXTERNAL): 2.2 (ref 0.9–1.1)

## 2023-02-15 NOTE — PROGRESS NOTES
ANTICOAGULATION MANAGEMENT     Savanna Rehman 82 year old female is on warfarin with therapeutic INR result. (Goal INR 2.0-3.0)    Recent labs: (last 7 days)     02/15/23  1328   INR 2.2*       ASSESSMENT       Source(s): Chart Review and Home Care/Facility Nurse       Warfarin doses taken: Warfarin taken as instructed    Diet: No new diet changes identified    New illness, injury, or hospitalization: No    Medication/supplement changes: Two days left of cefdinir    Signs or symptoms of bleeding or clotting: No    Previous INR: Subtherapeutic    Additional findings: None       PLAN     Recommended plan for no diet, medication or health factor changes affecting INR     Dosing Instructions: Continue your current warfarin dose with next INR in 1 week       Summary  As of 2/15/2023    Full warfarin instructions:  3.75 mg every Tue, Thu, Sat; 2.5 mg all other days   Next INR check:  2/21/2023             Telephone call with [facility] home care nurse who verbalizes understanding and agrees to plan    Orders given to  Homecare nurse/facility to recheck    Education provided:     Please call back if any changes to your diet, medications or how you've been taking warfarin    Plan made per [facility] anticoagulation protocol    Roya Sky, RN  Anticoagulation Clinic  2/15/2023    _______________________________________________________________________     Anticoagulation Episode Summary     Current INR goal:  2.0-3.0   TTR:  66.3 % (1 y)   Target end date:  Indefinite   Send INR reminders to:  NIKKI CHASE    Indications    Permanent atrial fibrillation (H) [I48.21]  Chronic atrial fibrillation (H) [I48.20]  Long term (current) use of anticoagulants [Z79.01]           Comments:  Home care          Anticoagulation Care Providers     Provider Role Specialty Phone number    Patti Suárez MD Referring Internal Medicine [phone]

## 2023-02-17 ENCOUNTER — TRANSFERRED RECORDS (OUTPATIENT)
Dept: HEALTH INFORMATION MANAGEMENT | Facility: CLINIC | Age: 83
End: 2023-02-17

## 2023-02-21 ENCOUNTER — TELEPHONE (OUTPATIENT)
Dept: INTERNAL MEDICINE | Facility: CLINIC | Age: 83
End: 2023-02-21
Payer: COMMERCIAL

## 2023-02-21 ENCOUNTER — ANTICOAGULATION THERAPY VISIT (OUTPATIENT)
Dept: ANTICOAGULATION | Facility: CLINIC | Age: 83
End: 2023-02-21
Payer: COMMERCIAL

## 2023-02-21 DIAGNOSIS — Z79.01 LONG TERM (CURRENT) USE OF ANTICOAGULANTS: ICD-10-CM

## 2023-02-21 DIAGNOSIS — N39.0 URINARY TRACT INFECTION WITHOUT HEMATURIA, SITE UNSPECIFIED: Primary | ICD-10-CM

## 2023-02-21 DIAGNOSIS — Z86.19 HISTORY OF CLOSTRIDIOIDES DIFFICILE COLITIS: ICD-10-CM

## 2023-02-21 DIAGNOSIS — I48.20 CHRONIC ATRIAL FIBRILLATION (H): ICD-10-CM

## 2023-02-21 DIAGNOSIS — I48.21 PERMANENT ATRIAL FIBRILLATION (H): Primary | ICD-10-CM

## 2023-02-21 LAB — INR (EXTERNAL): 2.8 (ref 0.9–1.1)

## 2023-02-21 RX ORDER — VANCOMYCIN HYDROCHLORIDE 125 MG/1
125 CAPSULE ORAL 2 TIMES DAILY
Qty: 10 CAPSULE | Refills: 0 | Status: SHIPPED | OUTPATIENT
Start: 2023-02-21 | End: 2023-03-08

## 2023-02-21 RX ORDER — CEFDINIR 300 MG/1
300 CAPSULE ORAL 2 TIMES DAILY
Qty: 6 CAPSULE | Refills: 0 | Status: SHIPPED | OUTPATIENT
Start: 2023-02-21 | End: 2023-03-08

## 2023-02-21 NOTE — TELEPHONE ENCOUNTER
Did she get relief of symptoms and then symptoms returned? The antibiotics was sensitive to bacteria seen   If it worked but then recurred we could repeat the course   Let me know

## 2023-02-21 NOTE — TELEPHONE ENCOUNTER
Patient states she had partial relief while on the antibiotic, but not like she normally does. She finished antibiotic on Friday and symptoms started to worsen on Saturday.     Mahsa Arteaga RN  [facility]

## 2023-02-21 NOTE — PROGRESS NOTES
ANTICOAGULATION MANAGEMENT     Savanna Rehman 82 year old female is on warfarin with therapeutic INR result. (Goal INR 2.0-3.0)    Recent labs: (last 7 days)     02/21/23  1228   INR 2.8*       ASSESSMENT       Source(s): Chart Review and Home Care/Facility Nurse       Warfarin doses taken: Warfarin taken as instructed    Diet: No new diet changes identified    New illness, injury, or hospitalization: No    Medication/supplement changes: Tylenol 1000 mg  as needed use which may be increasing INR today-has taken a couple days since last Thursday    Signs or symptoms of bleeding or clotting: No    Previous INR: Therapeutic last visit; previously outside of goal range    Additional findings: None       PLAN     Recommended plan for no diet, medication or health factor changes affecting INR     Dosing Instructions: Continue your current warfarin dose with next INR in 1 week       Summary  As of 2/21/2023    Full warfarin instructions:  3.75 mg every Tue, Thu, Sat; 2.5 mg all other days   Next INR check:  2/28/2023             Telephone call with [facility] home care nurse who verbalizes understanding and agrees to plan    Patient to recheck with home meter    Education provided:     Please call back if any changes to your diet, medications or how you've been taking warfarin    Plan made per [facility] anticoagulation protocol    Roya Sky, RN  Anticoagulation Clinic  2/21/2023    _______________________________________________________________________     Anticoagulation Episode Summary     Current INR goal:  2.0-3.0   TTR:  66.4 % (1 y)   Target end date:  Indefinite   Send INR reminders to:  NIKKI CHASE    Indications    Permanent atrial fibrillation (H) [I48.21]  Chronic atrial fibrillation (H) [I48.20]  Long term (current) use of anticoagulants [Z79.01]           Comments:  Home care          Anticoagulation Care Providers     Provider Role Specialty Phone number    Patti Suárez MD Referring  Internal Medicine [phone]

## 2023-02-21 NOTE — TELEPHONE ENCOUNTER
Patient is calling because she was recently treated for a UTI by Sushant John. She is finished with the medicine, but still having symptoms. So she is requesting another RX if possible. She would like it today if possible, due to weather.

## 2023-02-21 NOTE — TELEPHONE ENCOUNTER
I am a bit concerned about another antibiotic, when she has history of c diff - not sure if Sushant talked with ID before prescribing - in past  See she has had vanco with antibiotics in the past   The antibiotic was sensitive to the bacteria seen in culture      Can we call ID and see if they would be ok with a second course of same antibiotic and if vanco is needed?

## 2023-02-21 NOTE — TELEPHONE ENCOUNTER
Patient calls back and informed of recommendations from Maria Fernanda.     Mahsa Arteaga RN  [facility]

## 2023-02-21 NOTE — TELEPHONE ENCOUNTER
Attempted to contact patient. Left voice message to call back.   Please advise patient of recommendations from Maria Fernanda for antibiotic:     Omnicef twice daily for 3 days only     Vancomycin 125 mg twice daily for 5 days     Mahsa Arteaga RN  [facility]

## 2023-02-21 NOTE — TELEPHONE ENCOUNTER
[facility] Called [facility], Dr. Granados is on vacation, they will get message to partner to call back.     Mahsa Arteaga RN  [facility]

## 2023-02-21 NOTE — TELEPHONE ENCOUNTER
Call received from pharmacy clarifying if patient is to be on both antibiotics. Advised of below.

## 2023-02-27 ENCOUNTER — TELEPHONE (OUTPATIENT)
Dept: INTERNAL MEDICINE | Facility: CLINIC | Age: 83
End: 2023-02-27
Payer: COMMERCIAL

## 2023-02-27 NOTE — TELEPHONE ENCOUNTER
[facility] Physician Order medication change form received via fax. Placed in PCP's mailbox for review and signature.

## 2023-02-28 ENCOUNTER — ANTICOAGULATION THERAPY VISIT (OUTPATIENT)
Dept: ANTICOAGULATION | Facility: CLINIC | Age: 83
End: 2023-02-28
Payer: COMMERCIAL

## 2023-02-28 ENCOUNTER — TELEPHONE (OUTPATIENT)
Dept: ANTICOAGULATION | Facility: CLINIC | Age: 83
End: 2023-02-28
Payer: COMMERCIAL

## 2023-02-28 DIAGNOSIS — I48.21 PERMANENT ATRIAL FIBRILLATION (H): Primary | ICD-10-CM

## 2023-02-28 DIAGNOSIS — Z79.01 LONG TERM (CURRENT) USE OF ANTICOAGULANTS: ICD-10-CM

## 2023-02-28 DIAGNOSIS — I48.20 CHRONIC ATRIAL FIBRILLATION (H): ICD-10-CM

## 2023-02-28 LAB — INR (EXTERNAL): 2.2 (ref 0.9–1.1)

## 2023-02-28 NOTE — TELEPHONE ENCOUNTER
Patient is scheduled for a colonoscopy on 03/15/2023 at [facility] with Dr. Maci Coronel. Routing to [facility] for hold/bridge risk assessment.

## 2023-02-28 NOTE — PROGRESS NOTES
ANTICOAGULATION MANAGEMENT     Savanna Rehman 82 year old female is on warfarin with therapeutic INR result. (Goal INR 2.0-3.0)    Recent labs: (last 7 days)     02/28/23  1150   INR 2.2*       ASSESSMENT       Source(s): Chart Review and Home Care/Facility Nurse       Warfarin doses taken: Warfarin taken as instructed    Diet: No new diet changes identified    New illness, injury, or hospitalization: Treated for UTI per below. Pt states symptoms are improved.    Medication/supplement changes: Cefdinir 02/25-02/28 (took last dose this AM). This medication may increase INR but does not appear to have done so.    Signs or symptoms of bleeding or clotting: No    Previous INR: Therapeutic last 2(+) visits    Additional findings: None         PLAN     Recommended plan for temporary change(s) affecting INR     Dosing Instructions: Continue your current warfarin dose with next INR in 2 weeks. Gave OK for home care to check in 1 week if changes to health, diet, or medications.       Summary  As of 2/28/2023    Full warfarin instructions:  3.75 mg every Tue, Thu, Sat; 2.5 mg all other days   Next INR check:  3/14/2023             Telephone call with [facility] care nurse who verbalizes understanding and agrees to plan    Orders given to  Homecare nurse/facility to recheck    Education provided:     Goal range and lab monitoring: goal range and significance of current result    Plan made per [facility] anticoagulation protocol    Miko West RN  Anticoagulation Clinic  2/28/2023    _______________________________________________________________________     Anticoagulation Episode Summary     Current INR goal:  2.0-3.0   TTR:  67.7 % (1 y)   Target end date:  Indefinite   Send INR reminders to:  NIKKI CHASE    Indications    Permanent atrial fibrillation (H) [I48.21]  Chronic atrial fibrillation (H) [I48.20]  Long term (current) use of anticoagulants [Z79.01]           Comments:  Home care          Anticoagulation Care  Providers     Provider Role Specialty Phone number    Patti Suárez MD Referring Internal Medicine [phone]

## 2023-03-03 ENCOUNTER — MEDICAL CORRESPONDENCE (OUTPATIENT)
Dept: HEALTH INFORMATION MANAGEMENT | Facility: CLINIC | Age: 83
End: 2023-03-03

## 2023-03-03 ENCOUNTER — TELEPHONE (OUTPATIENT)
Dept: NURSING | Facility: CLINIC | Age: 83
End: 2023-03-03
Payer: COMMERCIAL

## 2023-03-03 DIAGNOSIS — E11.42 DIABETIC POLYNEUROPATHY ASSOCIATED WITH TYPE 2 DIABETES MELLITUS (H): ICD-10-CM

## 2023-03-04 NOTE — TELEPHONE ENCOUNTER
Patient reports that she has been trying to see PCP but patient has had issues with [facility] not coming to pick her up.   Patient states that she is needing a refill of her glipizide.  Informed that a glipizide was refilled on 2/8/2023 for a year supply.   Patient will call [facility] and speak with pharmacist regarding refill.  Michelle Andino RN   03/03/23 6:35 PM  [facility] Nurse Advisor

## 2023-03-06 RX ORDER — BISACODYL 5 MG
TABLET, DELAYED RELEASE (ENTERIC COATED) ORAL
Qty: 4 TABLET | Refills: 0 | Status: SHIPPED | OUTPATIENT
Start: 2023-03-06 | End: 2023-05-12

## 2023-03-07 DIAGNOSIS — E11.65 TYPE 2 DIABETES MELLITUS WITH HYPERGLYCEMIA, WITHOUT LONG-TERM CURRENT USE OF INSULIN (H): ICD-10-CM

## 2023-03-07 DIAGNOSIS — E11.42 DIABETIC POLYNEUROPATHY ASSOCIATED WITH TYPE 2 DIABETES MELLITUS (H): ICD-10-CM

## 2023-03-07 RX ORDER — BLOOD SUGAR DIAGNOSTIC
STRIP MISCELLANEOUS
Qty: 100 STRIP | Refills: 1 | Status: SHIPPED | OUTPATIENT
Start: 2023-03-07 | End: 2024-01-12

## 2023-03-07 NOTE — TELEPHONE ENCOUNTER
"MONICA-PROCEDURAL ANTICOAGULATION  MANAGEMENT    ASSESSMENT     Warfarin interruption plan for colonoscopy on 03/15/2023.    Indication for Anticoagulation: Atrial Fibrillation      [id number] = 4 (Age >= 75, Stroke and Female)     CHF = EF >40%, noted 55-60% per  cardio note therefore not added to score      Monica-Procedure Risk stratification for thromboembolism: low (2017 ACC periprocedure pathway for NVAF Expert Consensus)    NVAF: 2017 ACC periprocedure pathway for NVAF advises NO bridge for low risk stratification ([id number] score <=4 and no prior hx of stroke, TIA or systemic embolism)     RECOMMENDATION       Pre-Procedure:  o Hold warfarin for 5 days, until after procedure startin/10/2023  o No Bridge      Post-Procedure:  o Resume warfarin dose if okay with provider doing procedure on night of procedure, 03/15/2023 PM: 5mg, then resume maintenance dose  o Recheck INR ~ 7 days after resuming warfarin       Plan not routed to referring provider for approval, previously approved at  pre-op for procedure , prior to reschedule    Jayleen Oconnor STALIN    SUBJECTIVE/OBJECTIVE     Savanna Orozcocindykarie, a 82 year old female    Goal INR Range: 2.0-3.0     Patient bridged in past: Yes: while inpatient/high risk, last       Wt Readings from Last 3 Encounters:   23 112.5 kg (248 lb)   23 113.4 kg (250 lb)   23 113.4 kg (250 lb)      Ideal body weight: 63.9 kg (140 lb 14 oz)  Adjusted ideal body weight: 83.3 kg (183 lb 11.6 oz)     Estimated body mass index is 37.71 kg/m  as calculated from the following:    Height as of 23: 1.727 m (5' 8\").    Weight as of 23: 112.5 kg (248 lb).    Lab Results   Component Value Date    INR 2.2 (A) 2023    INR 2.8 (A) 2023    INR 2.2 (A) 02/15/2023     Lab Results   Component Value Date    HGB 14.9 2023    HGB 14.7 2021    HCT 44.9 2023    HCT 44.5 2021     2023     2021     Lab " Results   Component Value Date    CR 0.88 02/06/2023    CR 0.96 (H) 01/17/2023    CR 0.86 11/07/2022     Estimated Creatinine Clearance: 67.1 mL/min (based on SCr of 0.88 mg/dL).

## 2023-03-07 NOTE — TELEPHONE ENCOUNTER
Prescription approved per [facility]. BENITA Graham R.N.

## 2023-03-07 NOTE — TELEPHONE ENCOUNTER
Called and spoke to patient and reviewed the hold plan. Patient stated understanding and was agreeable with plan.      Patient state she has her pre-op appointment tomorrow 3/8/23 and will have her INR checked at that time.  The home care nurse was supposed to see her today but the patient didn't think it was necessary since she has her pre-op tomorrow.    Left detailed message for home care nurse with hold instructions and re-check date. Advised to call back with any questions.    EMILY Johnson, RN  [facility]

## 2023-03-08 ENCOUNTER — OFFICE VISIT (OUTPATIENT)
Dept: INTERNAL MEDICINE | Facility: CLINIC | Age: 83
End: 2023-03-08
Payer: COMMERCIAL

## 2023-03-08 VITALS
HEART RATE: 95 BPM | OXYGEN SATURATION: 92 % | BODY MASS INDEX: 38 KG/M2 | DIASTOLIC BLOOD PRESSURE: 77 MMHG | SYSTOLIC BLOOD PRESSURE: 146 MMHG | RESPIRATION RATE: 18 BRPM | HEIGHT: 68 IN | WEIGHT: 250.7 LBS | TEMPERATURE: 97.2 F

## 2023-03-08 DIAGNOSIS — Z01.818 PRE-OP EXAM: Primary | ICD-10-CM

## 2023-03-08 DIAGNOSIS — N18.31 TYPE 2 DIABETES MELLITUS WITH STAGE 3A CHRONIC KIDNEY DISEASE, WITHOUT LONG-TERM CURRENT USE OF INSULIN (H): ICD-10-CM

## 2023-03-08 DIAGNOSIS — I48.20 CHRONIC ATRIAL FIBRILLATION (H): ICD-10-CM

## 2023-03-08 DIAGNOSIS — R19.4 ENCOUNTER FOR DIAGNOSTIC COLONOSCOPY DUE TO CHANGE IN BOWEL HABITS: ICD-10-CM

## 2023-03-08 DIAGNOSIS — Z86.19 HISTORY OF CLOSTRIDIOIDES DIFFICILE COLITIS: ICD-10-CM

## 2023-03-08 DIAGNOSIS — E11.22 TYPE 2 DIABETES MELLITUS WITH STAGE 3A CHRONIC KIDNEY DISEASE, WITHOUT LONG-TERM CURRENT USE OF INSULIN (H): ICD-10-CM

## 2023-03-08 PROCEDURE — 99214 OFFICE O/P EST MOD 30 MIN: CPT | Performed by: FAMILY MEDICINE

## 2023-03-08 PROCEDURE — 93000 ELECTROCARDIOGRAM COMPLETE: CPT | Performed by: FAMILY MEDICINE

## 2023-03-08 ASSESSMENT — PAIN SCALES - GENERAL: PAINLEVEL: NO PAIN (0)

## 2023-03-08 NOTE — PROGRESS NOTES
[place] [address]  [address]  [facility] [address]  Phone: [phone]  Primary Provider: Patti Suárez  Pre-op Performing Provider: UMA DIEGO      [id number]}  PREOPERATIVE EVALUATION:  Today's date: 3/8/2023    Savanna Rehman is a 82 year old female who presents for a preoperative evaluation.    Surgical Information:  Surgery/Procedure: COLONOSCOPY  Surgery Location:  GI  Surgeon: Maci Coronel MD  Surgery Date: 3/15/2023  Time of Surgery: 10:30 AM  Where patient plans to recover: At home with family  Fax number for surgical facility:     Type of Anesthesia Anticipated: to be determined    Assessment & Plan     The proposed surgical procedure is considered INTERMEDIATE risk.    Problem List Items Addressed This Visit     Chronic atrial fibrillation (H)    Diabetes mellitus, type 2 (H)   Other Visit Diagnoses     Pre-op exam    -  Primary    Relevant Orders    EKG 12-lead complete w/read - Clinics (Completed)    Encounter for diagnostic colonoscopy due to change in bowel habits        History of Clostridioides difficile colitis                     Risks and Recommendations:  The patient has the following additional risks and recommendations for perioperative complications:   - No identified additional risk factors other than previously addressed      Medications:  She will stop warfarin as of March 10 and resume postprocedure.  She does not need to take any oral medications on the morning of her colonoscopy.      RECOMMENDATION:      APPROVAL GIVEN to proceed with proposed procedure, without further diagnostic evaluation.        This was a 30-minute evaluation including review of chart, interview and examine patient, review lab and EKG, discuss timing of warfarin dosages, evaluate atrial fibrillation and diabetes, prepare medical record.      Subjective     HPI related to upcoming procedure:     This is an 82-year-old woman requesting  colonoscopy.  She has had a change in bowels, more loose bowels and urgency of stool in the last year.  She is not having blood in her stool.    A colonoscopy was performed in 2015 and it was not possible to run the scope beyond the sigmoid colon.    Patient has a history of atrial fibrillation and is on warfarin.  She is currently not having chest pains, palpitations, syncope.    She has a history of diabetes but is not on medication.  A1c was 6.3 in November 2022.      Preop Questions 3/8/2023   1. Have you ever had a heart attack or stroke? No   2. Have you ever had surgery on your heart or blood vessels, such as a stent placement, a coronary artery bypass, or surgery on an artery in your head, neck, heart, or legs? No   3. Do you have chest pain with activity? No   4. Do you have a history of  heart failure? No   5. Do you currently have a cold, bronchitis or symptoms of other infection? No   6. Do you have a cough, shortness of breath, or wheezing? No   7. Do you or anyone in your family have previous history of blood clots? YES -    8. Do you or does anyone in your family have a serious bleeding problem such as prolonged bleeding following surgeries or cuts? YES -    9. Have you ever had problems with anemia or been told to take iron pills? YES -    10. Have you had any abnormal blood loss such as black, tarry or bloody stools, or abnormal vaginal bleeding? No   11. Have you ever had a blood transfusion? YES -    11a. Have you ever had a transfusion reaction? YES -    12. Are you willing to have a blood transfusion if it is medically needed before, during, or after your surgery? Yes   13. Have you or any of your relatives ever had problems with anesthesia? No   14. Do you have sleep apnea, excessive snoring or daytime drowsiness? YES -    14a. Do you have a CPAP machine? No   15. Do you have any artifical heart valves or other implanted medical devices like a pacemaker, defibrillator, or continuous glucose  monitor? No   16. Do you have artificial joints? YES -    17. Are you allergic to latex? No   18. Is there any chance that you may be pregnant? -     Health Care Directive:  Patient does not have a Health Care Directive or Living Will: Discussed advance care planning with patient; however, patient declined at this time.    Preoperative Review of :   reviewed - 1 small butalbital Rx in the last year.        Review of Systems    No fevers.  No cough or shortness of breath.  No chest pains.  No edema.  No abdominal pain.  No localized weakness.        Patient Active Problem List    Diagnosis Date Noted     Adjustment disorder with mixed anxiety and depressed mood 12/05/2022     Priority: Medium     Adjustment disorder with anxiety 11/28/2022     Priority: Medium     Bony pelvic pain 07/06/2022     Priority: Medium     C. difficile colitis, recurrent -- she needs to take Vanco 125 bid anytime she is on a different Abx  03/18/2022     Priority: Medium     Chronic kidney disease, stage 3 10/07/2021     Priority: Medium     Ascending aorta dilatation (H) 04/20/2021     Priority: Medium     Nonrheumatic tricuspid valve regurgitation 04/20/2021     Priority: Medium     Anemia 12/16/2020     Priority: Medium     Formatting of this note might be different from the original.  Iron Deficiency anemia       Gastroesophageal reflux disease 12/16/2020     Priority: Medium     Gout 12/16/2020     Priority: Medium     Diabetes mellitus, type 2 (H) 09/03/2020     Priority: Medium     Vitamin D deficiency      Priority: Medium     Hyperlipidemia LDL goal <100 12/10/2019     Priority: Medium     Permanent atrial fibrillation (H) 10/23/2019     Priority: Medium     Long term (current) use of anticoagulants 10/23/2019     Priority: Medium     Chronic atrial fibrillation (H) 10/21/2019     Priority: Medium     Recurrent UTI 08/13/2019     Priority: Medium     Urgency-frequency syndrome 08/13/2019     Priority: Medium     Urgency  incontinence 08/13/2019     Priority: Medium     Candidiasis of skin 08/13/2019     Priority: Medium     Morbid obesity (H) 08/29/2018     Priority: Medium     CRISTIANA (obstructive sleep apnea)  08/29/2018     Priority: Medium     Angioedema 03/09/2015     Priority: Medium      Past Medical History:   Diagnosis Date     Anemia     Iron Deficiency anemia     Atrial fibrillation (H)      CAD (coronary artery disease)     non-obstructive     Chronic pain     neck, low back, legs     Congestive heart failure (H)      Degenerative disk disease      Fibromyalgia      Gastro-oesophageal reflux disease      Gout      Hiatal hernia      Mumps      Neuropathy      CRISTIANA (obstructive sleep apnea) - CPAP      Palpitations      Pernicious anemia      Sleep apnea     uses CPAP.     Urinary incontinence      Vitamin D deficiency      Past Surgical History:   Procedure Laterality Date     APPENDECTOMY       CHOLECYSTECTOMY       COLONOSCOPY  3/15/2011     CORONARY ANGIOGRAPHY ADULT ORDER       CYSTOSCOPY, BIOPSY BLADDER, COMBINED N/A 7/13/2020    Procedure: CYSTOSCOPY, WITH BLADDER BIOPSY;  Surgeon: Deisy Wilson MD;  Location: [facility] OR     HEART CATH LEFT HEART CATH  12/30/16    medication management     HYSTERECTOMY TOTAL ABDOMINAL       Knee replacement NOS Left      LAPAROSCOPIC NISSEN FUNDOPLICATION N/A 2/4/2015    Procedure: LAPAROSCOPIC NISSEN FUNDOPLICATION;  Surgeon: Armando Ansari MD;  Location: [facility] OR     TONSILLECTOMY       TRANSPOSITION ULNAR NERVE (ELBOW)       Current Outpatient Medications   Medication Sig Dispense Refill     ACE/ARB/ARNI NOT PRESCRIBED (INTENTIONAL) Please choose reason not prescribed, below       alcohol swab prep pads Use to swab area of injection/chandrakant as directed. 100 each 3     bisacodyl (DULCOLAX) 5 MG EC tablet Take 2 tablets at 3 pm the day before your procedure. If your procedure is before 11 am, take 2 additional tablets at 11 pm. If your procedure is after 11 am, take 2 additional  tablets at 6 am. For additional instructions refer to your colonoscopy prep instructions. 4 tablet 0     blood glucose (NO BRAND SPECIFIED) lancets standard Use to test blood sugar  as directed. 1 each 0     blood glucose (NO BRAND SPECIFIED) lancing device Device to be used with lancets. Patient requests ACE*COMMet 2 lancing device to check blood glucose once per day. 1 each 0     blood glucose (ONETOUCH ULTRA) test strip Use to test blood sugar 1 times daily 100 strip 1     blood glucose calibration (NO BRAND SPECIFIED) solution Use to calibrate blood glucose monitor 1 Bottle 3     blood glucose monitoring (NO BRAND SPECIFIED) meter device kit Use to test blood sugar 1 times daily or as directed. 1 kit 1     blood glucose monitoring (NO BRAND SPECIFIED) meter device kit Use to test blood sugar 1 time daily 1 kit 0     butalbital-aspirin-caffeine (FIORINAL) -40 MG capsule TAKE 1 CAPSULE BY MOUTH EVERY 6 HOURS AS NEEDED FOR HEADACHE 30 capsule 0     cefdinir (OMNICEF) 300 MG capsule Take 1 capsule (300 mg) by mouth 2 times daily 6 capsule 0     cefdinir (OMNICEF) 300 MG capsule Take 1 capsule (300 mg) by mouth 2 times daily 14 capsule 0     EPINEPHrine (ANY BX GENERIC EQUIV) 0.3 MG/0.3ML injection 2-pack INJECT 0.3MLS (0.3MG) INTO THE MUSCLE ONCE AS NEEDED FOR ANAPHYLAXIS 2 each 1     glipiZIDE (GLUCOTROL XL) 2.5 MG 24 hr tablet Take 1 tablet (2.5 mg) by mouth 2 times daily 180 tablet 3     meclizine (ANTIVERT) 12.5 MG tablet TAKE 1 TABLET BY MOUTH THREE TIMES DAILY AS NEEDED FOR DIZZINESS 30 tablet 1     polyethylene glycol (GOLYTELY) 236 g suspension The night before the exam at 6 pm drink an 8-ounce glass every 15 minutes until the jug is half empty. If you arrive before 11 AM: Drink the other half of the Golytely jug at 11 PM night before procedure. If you arrive after 11 AM: Drink the other half of the Golytely jug at 6 AM day of procedure. For additional instructions refer to your colonoscopy prep  instructions. 4000 mL 0     sertraline (ZOLOFT) 50 MG tablet Take 1 tablet (50 mg) by mouth daily 90 tablet 1     trospium (SANCTURA XR) 60 MG CP24 24 hr capsule Take 1 capsule (60 mg) by mouth every morning 30 capsule 3     vancomycin (VANCOCIN) 125 MG capsule Take 1 capsule (125 mg) by mouth 2 times daily 10 capsule 0     warfarin ANTICOAGULANT (COUMADIN) 2.5 MG tablet TAKE 2.5 mg (2.5 mg x 1) every e, u, Sat; 3.75 mg (2.5 mg x 1.5) all other days or as directed. 135 tablet 1       Allergies   Allergen Reactions     Augmented Betamethasone Diprop [Betamethasone] Other (See Comments)     Severe yeast infection     Petroleum Jelly [Petrolatum] Anaphylaxis     Rash and swelling     Shellfish-Derived Products Anaphylaxis     Tongue swelling     Aspirin Swelling     tiongue swelling     Bacitracin      Rash swelling     Bactrim [Sulfamethoxazole W/Trimethoprim] Dizziness     Coumadin [Warfarin] Swelling     Leg swelling     Darvon [Propoxyphene] Swelling     Throat closes     Dilaudid [Hydromorphone]      Levaquin [Levofloxacin] Swelling     Tongue swelling     Neomycin Swelling     rash     Neosporin [Neomycin-Polymyx-Gramicid] Swelling     rash     Nitrofurantoin      SOB, GI upset,     Oxycodone      Severe itching     Percodan [Oxycodone-Aspirin]      Severe itching     Tramadol      Vicodin [Hydrocodone-Acetaminophen]      Severe itching       Xarelto [Rivaroxaban]      Adhesive Tape Rash     Band aids      Codeine Rash     Hydrocortisone Rash and Swelling     Other Environmental Allergy Rash     Adhesive tape   Band aids         Social History     Tobacco Use     Smoking status: Former     Packs/day: 0.50     Years: 50.00     Pack years: 25.00     Types: Cigarettes     Quit date: 2014     Years since quittin.5     Passive exposure: Past     Smokeless tobacco: Never   Substance Use Topics     Alcohol use: Yes     Comment: socially       History   Drug Use Unknown         Objective     BP (!) 146/77    "Pulse 95   Temp 97.2  F (36.2  C) (Tympanic)   Resp 18   Ht 1.727 m (5' 8\")   Wt 113.7 kg (250 lb 11.2 oz)   LMP  (LMP Unknown)   SpO2 92%   Breastfeeding No   BMI 38.12 kg/m      Physical Exam    Alert, NAD.  Equal pupils, normal pharynx.  Neck without mass or thyromegaly.  Lungs clear.  Cardiac irregular, controlled rate, no murmur.  Abdomen nontender, nondistended.  Extremities no significant edema.  Motor strength is intact, speech clear.  Mentation seems WNL.          Recent Labs   Lab Test 02/28/23  1150 02/21/23  1228 02/07/23  1300 02/06/23  1519 01/31/23  1154 01/17/23  1445 11/15/22  1302 11/07/22  1218   HGB  --   --   --  14.9  --   --   --  15.2   PLT  --   --   --  183  --   --   --  176   INR 2.2* 2.8*   < > 1.52*   < >  --    < >  --    NA  --   --   --  138  --  138  --  140   POTASSIUM  --   --   --  4.4  --  4.3  --  4.6   CR  --   --   --  0.88  --  0.96*  --  0.86   A1C  --   --   --  7.3*  --   --   --  6.3*    < > = values in this interval not displayed.        Diagnostics:  No labs were ordered during this visit.       EKG  Reviewed from 8-17-9-.  Rhythm atrial fibrillation.  Rate 58.  Axis normal.  No significant ST or T changes.  Question of anterior scar.  No ectopy.  Abnormal EKG showing atrial fibrillation with controlled rate.        Revised Cardiac Risk Index (RCRI):  The patient has the following serious cardiovascular risks for perioperative complications:   - No serious cardiac risks = 0 points     RCRI Interpretation: 1 point: Class II (low risk - 0.9% complication rate)           Signed Electronically by: Yaw Carrillo MD  Copy of this evaluation report is provided to requesting physician.      "

## 2023-03-09 ENCOUNTER — TELEPHONE (OUTPATIENT)
Dept: INTERNAL MEDICINE | Facility: CLINIC | Age: 83
End: 2023-03-09

## 2023-03-09 DIAGNOSIS — R51.9 ACUTE INTRACTABLE HEADACHE, UNSPECIFIED HEADACHE TYPE: ICD-10-CM

## 2023-03-09 DIAGNOSIS — F41.9 ANXIETY AND DEPRESSION: ICD-10-CM

## 2023-03-09 DIAGNOSIS — F32.A ANXIETY AND DEPRESSION: ICD-10-CM

## 2023-03-09 NOTE — TELEPHONE ENCOUNTER
S-(situation): Patient calls regarding Sertraline    B-(background): Patient wanting to discuss increasing Sertraline dose with Sushant Escobar.     A-(assessment): Currently taking Sertraline 50 mg daily, she had been doing well with this, dose, but now thinks she may need to increase it. She states that her daughter and good friend who are both RNs, have noticed that she is not doing as well as she was in the past. Patient states that after talking to them, she does realize that she has not been handling things very well recently. States that a very close friend recently passed away and now her favorite grandson is moving across the country. She is also anxious about upcoming colonoscopy being done next week.    R-(recommendations): Routed to Sushant to review for recommendations. Patient has several of the sertraline 50 mg tabs left at home. Depending on Sushant's recommendation, she does not think she could break them in half.     Last appointment with Sushant - 2/8/23    Mahsa Arteaga RN  [facility]

## 2023-03-10 ENCOUNTER — NURSE TRIAGE (OUTPATIENT)
Dept: INTERNAL MEDICINE | Facility: CLINIC | Age: 83
End: 2023-03-10
Payer: COMMERCIAL

## 2023-03-10 DIAGNOSIS — R19.5 LOOSE STOOLS: Primary | ICD-10-CM

## 2023-03-10 NOTE — TELEPHONE ENCOUNTER
I placed an order for a test kit to rule out C. difficile.  If patient is not able to maintain hydration and she should be seen in urgent care or ER.    I recommend that she drink Pedialyte rather than Diet Coke.    Sushant Escobar NP

## 2023-03-10 NOTE — TELEPHONE ENCOUNTER
Provider Recommendation Follow Up:   Reached patient/caregiver. Informed of provider's recommendations. Patient verbalized understanding and agrees with the plan, but unsure if she can have Pedialyte with having diabetes. Informed patient that if she cannot find a no-sugar Pedialyte, there is a Gatorade without sugar she could drink instead.     Mahsa Arteaga RN  [facility]

## 2023-03-10 NOTE — TELEPHONE ENCOUNTER
We can increase dose to 100mg as long as she has been taking 50mg for over one week. Just ask patient to take 2 of the 50mg tablets.    Sushant Escobar NP

## 2023-03-10 NOTE — TELEPHONE ENCOUNTER
Patient advised of Sushant's recommendations. She has almost a full bottle of sertraline 50 mg at home. Advised patient to call when she has about 1 week left to request updated prescription.     Mahsa Arteaga RN  [facility]

## 2023-03-10 NOTE — TELEPHONE ENCOUNTER
Patient calls. She asked about the 75mg of zoloft and will she be getting a new script? She stated the current 50mg is a very small pill and may be difficult to cut. She mentioned she would use a knife if the pill cutter did not work to which she was urged to not use a knife ever.     She then asked when can she go up to 100mg? It was said that Dr would most likely want to see her to talk about the increase form 50 to 75 before going to any higher dose. The patient agreed.

## 2023-03-10 NOTE — TELEPHONE ENCOUNTER
Nurse Triage SBAR    Is this a 2nd Level Triage? YES, LICENSED PRACTITIONER REVIEW IS REQUIRED    Situation: Patient calls regarding loose stools.     Background: Patient experiencing loose stools for several days. She mentioned this to GI due to upcoming colonoscopy and they said to speak to her PCP to rule out c.diff, they don't want to do the colonoscopy if she has c.diff.     Assessment: She experienced explosive loose stools Saturday. This occurred about 6 hours after eating out and thought it was food poisoning - she ate fish at the restaurant and the rest of her family had steak. Patient states that she was then constipated for a few days before the loose stools restarted on Wednesday. Patient states that since Wednesday, she has been eating BRAT diet, but still having explosive diarrhea 1-2 times a day and it is triggered by eating. She did not drink very much yesterday - only had about 1/2 bottle of Diet Coke and is a little dizzy or lightheaded at times today. She is urinating, last urinated about 1 hour ago.      She was recently on antibiotic for UTI - finished antibiotic on 2/28/23. She was given Cefdinir and Vancomycin (Vanco ordered per recommendations from ID due to history of C diff) and told to take both antibiotic, but patient states she didn't think she needed to take the Vancomycin if she wasn't having loose stools.     Protocol Recommended Disposition:   Callback by PCP Today    Recommendation: PCP is out of the office. Patient saw Sushant for the UTI symptoms, message forwarded to Sushant to review if she can have orders to test for c.diff without an appointment. Patient states she has a hat and specimen cup at home and could collect the sample for lab.   Encouraged patient to drink more fluids    Routed to provider    Does the patient meet one of the following criteria for ADS visit consideration? 16+ years old, with an [facility] PCP     TIP  Providers, please consider if this condition is  appropriate for management at one of our Acute and Diagnostic Services sites.     If patient is a good candidate, please use dotphrase <dot>triageresponse and select Refer to ADS to document.      Mahsa Arteaga RN  [facility]      Reason for Disposition    Recent antibiotic therapy (i.e., within last 2 months) and diarrhea present > 3 days since antibiotic was stopped    Additional Information    Negative: Shock suspected (e.g., cold/pale/clammy skin, too weak to stand, low BP, rapid pulse)    Negative: Difficult to awaken or acting confused (e.g., disoriented, slurred speech)    Negative: Sounds like a life-threatening emergency to the triager    Negative: Vomiting also present and worse than the diarrhea    Negative: Blood in stool and without diarrhea    Negative: SEVERE abdominal pain (e.g., excruciating) and present > 1 hour    Negative: SEVERE abdominal pain and age > 60 years    Negative: Bloody, black, or tarry bowel movements (Exception: chronic-unchanged black-grey bowel movements and is taking iron pills or Pepto-Bismol)    Negative: SEVERE diarrhea (e.g., 7 or more times / day more than normal) and age > 60 years    Negative: Constant abdominal pain lasting > 2 hours    Negative: Drinking very little and has signs of dehydration (e.g., no urine > 12 hours, very dry mouth, very lightheaded)    Negative: Patient sounds very sick or weak to the triager    Negative: SEVERE diarrhea (e.g., 7 or more times / day more than normal) and present > 24 hours (1 day)    Negative: MODERATE diarrhea (e.g., 4-6 times / day more than normal) and present > 48 hours (2 days)    Negative: MODERATE diarrhea (e.g., 4-6 times / day more than normal) and age > 70 years    Negative: Abdominal pain (Exception: Pain clears completely with each passage of diarrhea stool.)    Negative: Fever > 101 F (38.3 C)    Negative: Blood in the stool    Negative: Mucus or pus in stool has been present > 2 days and  diarrhea is more than mild    Negative: Weak immune system (e.g., HIV positive, cancer chemo, splenectomy, organ transplant, chronic steroids)    Negative: Travel to a foreign country in past month    Protocols used: DIARRHEA-A-OH

## 2023-03-15 ENCOUNTER — ANESTHESIA (OUTPATIENT)
Dept: GASTROENTEROLOGY | Facility: CLINIC | Age: 83
End: 2023-03-15
Payer: COMMERCIAL

## 2023-03-15 ENCOUNTER — DOCUMENTATION ONLY (OUTPATIENT)
Dept: ANTICOAGULATION | Facility: CLINIC | Age: 83
End: 2023-03-15
Payer: COMMERCIAL

## 2023-03-15 ENCOUNTER — ANESTHESIA EVENT (OUTPATIENT)
Dept: GASTROENTEROLOGY | Facility: CLINIC | Age: 83
End: 2023-03-15
Payer: COMMERCIAL

## 2023-03-15 ENCOUNTER — HOSPITAL ENCOUNTER (OUTPATIENT)
Facility: CLINIC | Age: 83
Discharge: HOME | End: 2023-03-15
Attending: COLON & RECTAL SURGERY | Admitting: COLON & RECTAL SURGERY
Payer: COMMERCIAL

## 2023-03-15 VITALS
HEART RATE: 62 BPM | RESPIRATION RATE: 21 BRPM | OXYGEN SATURATION: 95 % | WEIGHT: 248 LBS | SYSTOLIC BLOOD PRESSURE: 129 MMHG | HEIGHT: 68 IN | DIASTOLIC BLOOD PRESSURE: 72 MMHG | BODY MASS INDEX: 37.59 KG/M2

## 2023-03-15 DIAGNOSIS — Z12.11 ENCOUNTER FOR SCREENING COLONOSCOPY: Primary | ICD-10-CM

## 2023-03-15 LAB — COLONOSCOPY: NORMAL

## 2023-03-15 PROCEDURE — 88305 TISSUE EXAM BY PATHOLOGIST: CPT | Mod: 26 | Performed by: PATHOLOGY

## 2023-03-15 PROCEDURE — 45380 COLONOSCOPY AND BIOPSY: CPT | Performed by: COLON & RECTAL SURGERY

## 2023-03-15 PROCEDURE — 999N000010 HC STATISTIC ANES STAT CODE-CRNA PER MINUTE: Performed by: COLON & RECTAL SURGERY

## 2023-03-15 PROCEDURE — 88305 TISSUE EXAM BY PATHOLOGIST: CPT | Mod: TC | Performed by: COLON & RECTAL SURGERY

## 2023-03-15 PROCEDURE — 250N000009 HC RX 250: Performed by: REGISTERED NURSE

## 2023-03-15 PROCEDURE — 45385 COLONOSCOPY W/LESION REMOVAL: CPT | Performed by: COLON & RECTAL SURGERY

## 2023-03-15 PROCEDURE — 258N000003 HC RX IP 258 OP 636: Performed by: REGISTERED NURSE

## 2023-03-15 PROCEDURE — 250N000011 HC RX IP 250 OP 636: Performed by: REGISTERED NURSE

## 2023-03-15 PROCEDURE — 370N000017 HC ANESTHESIA TECHNICAL FEE, PER MIN: Performed by: COLON & RECTAL SURGERY

## 2023-03-15 RX ORDER — PROPOFOL 10 MG/ML
INJECTION, EMULSION INTRAVENOUS CONTINUOUS PRN
Status: DISCONTINUED | OUTPATIENT
Start: 2023-03-15 | End: 2023-03-15

## 2023-03-15 RX ORDER — SODIUM CHLORIDE, SODIUM LACTATE, POTASSIUM CHLORIDE, CALCIUM CHLORIDE 600; 310; 30; 20 MG/100ML; MG/100ML; MG/100ML; MG/100ML
INJECTION, SOLUTION INTRAVENOUS CONTINUOUS PRN
Status: DISCONTINUED | OUTPATIENT
Start: 2023-03-15 | End: 2023-03-15

## 2023-03-15 RX ORDER — DEXMEDETOMIDINE HYDROCHLORIDE 4 UG/ML
INJECTION, SOLUTION INTRAVENOUS PRN
Status: DISCONTINUED | OUTPATIENT
Start: 2023-03-15 | End: 2023-03-15

## 2023-03-15 RX ORDER — LIDOCAINE HYDROCHLORIDE 20 MG/ML
INJECTION, SOLUTION INFILTRATION; PERINEURAL PRN
Status: DISCONTINUED | OUTPATIENT
Start: 2023-03-15 | End: 2023-03-15

## 2023-03-15 RX ORDER — PROPOFOL 10 MG/ML
INJECTION, EMULSION INTRAVENOUS PRN
Status: DISCONTINUED | OUTPATIENT
Start: 2023-03-15 | End: 2023-03-15

## 2023-03-15 RX ORDER — LIDOCAINE 40 MG/G
CREAM TOPICAL
Status: DISCONTINUED | OUTPATIENT
Start: 2023-03-15 | End: 2023-03-15 | Stop reason: HOSPADM

## 2023-03-15 RX ORDER — ONDANSETRON 2 MG/ML
4 INJECTION INTRAMUSCULAR; INTRAVENOUS
Status: DISCONTINUED | OUTPATIENT
Start: 2023-03-15 | End: 2023-03-15 | Stop reason: HOSPADM

## 2023-03-15 RX ORDER — ONDANSETRON 2 MG/ML
INJECTION INTRAMUSCULAR; INTRAVENOUS PRN
Status: DISCONTINUED | OUTPATIENT
Start: 2023-03-15 | End: 2023-03-15

## 2023-03-15 RX ADMIN — ONDANSETRON 4 MG: 2 INJECTION INTRAMUSCULAR; INTRAVENOUS at 10:57

## 2023-03-15 RX ADMIN — DEXMEDETOMIDINE HYDROCHLORIDE 8 MCG: 200 INJECTION INTRAVENOUS at 10:59

## 2023-03-15 RX ADMIN — PROPOFOL 20 MG: 10 INJECTION, EMULSION INTRAVENOUS at 10:59

## 2023-03-15 RX ADMIN — PHENYLEPHRINE HYDROCHLORIDE 100 MCG: 10 INJECTION INTRAVENOUS at 11:25

## 2023-03-15 RX ADMIN — PHENYLEPHRINE HYDROCHLORIDE 100 MCG: 10 INJECTION INTRAVENOUS at 11:13

## 2023-03-15 RX ADMIN — PHENYLEPHRINE HYDROCHLORIDE 100 MCG: 10 INJECTION INTRAVENOUS at 11:19

## 2023-03-15 RX ADMIN — PHENYLEPHRINE HYDROCHLORIDE 150 MCG: 10 INJECTION INTRAVENOUS at 11:28

## 2023-03-15 RX ADMIN — DEXMEDETOMIDINE HYDROCHLORIDE 4 MCG: 200 INJECTION INTRAVENOUS at 11:14

## 2023-03-15 RX ADMIN — PROPOFOL 125 MCG/KG/MIN: 10 INJECTION, EMULSION INTRAVENOUS at 10:57

## 2023-03-15 RX ADMIN — PHENYLEPHRINE HYDROCHLORIDE 100 MCG: 10 INJECTION INTRAVENOUS at 11:21

## 2023-03-15 RX ADMIN — LIDOCAINE HYDROCHLORIDE 30 MG: 20 INJECTION, SOLUTION INFILTRATION; PERINEURAL at 10:57

## 2023-03-15 RX ADMIN — PHENYLEPHRINE HYDROCHLORIDE 100 MCG: 10 INJECTION INTRAVENOUS at 11:16

## 2023-03-15 RX ADMIN — PHENYLEPHRINE HYDROCHLORIDE 100 MCG: 10 INJECTION INTRAVENOUS at 11:04

## 2023-03-15 RX ADMIN — SODIUM CHLORIDE, POTASSIUM CHLORIDE, SODIUM LACTATE AND CALCIUM CHLORIDE: 600; 310; 30; 20 INJECTION, SOLUTION INTRAVENOUS at 10:52

## 2023-03-15 ASSESSMENT — COPD QUESTIONNAIRES: COPD: 0

## 2023-03-15 ASSESSMENT — ACTIVITIES OF DAILY LIVING (ADL)
ADLS_ACUITY_SCORE: 35
ADLS_ACUITY_SCORE: 35

## 2023-03-15 ASSESSMENT — ENCOUNTER SYMPTOMS: SEIZURES: 0

## 2023-03-15 ASSESSMENT — LIFESTYLE VARIABLES: TOBACCO_USE: 0

## 2023-03-15 NOTE — INTERVAL H&P NOTE
"I have reviewed the surgical (or preoperative) H&P that is linked to this encounter, and examined the patient. There are no significant changes    Clinical Conditions Present on Arrival:  Clinically Significant Risk Factors Present on Admission                  # Drug Induced Coagulation Defect: home medication list includes an anticoagulant medication   # DMII: A1C = 7.3 % (Ref range: <5.7 %) within past 6 months  # Obesity: Estimated body mass index is 38.12 kg/m  as calculated from the following:    Height as of 3/8/23: 1.727 m (5' 8\").    Weight as of 3/8/23: 113.7 kg (250 lb 11.2 oz).       "

## 2023-03-15 NOTE — ANESTHESIA CARE TRANSFER NOTE
Patient: Savanna Rehman    Procedure: Procedure(s):  COLONOSCOPY, WITH POLYPECTOMY AND BIOPSY  COLONOSCOPY, FLEXIBLE, WITH LESION REMOVAL USING SNARE       Diagnosis: Diarrhea, unspecified type [R19.7]  Diagnosis Additional Information: No value filed.    Anesthesia Type:   MAC     Note:    Oropharynx: oropharynx clear of all foreign objects and spontaneously breathing  Level of Consciousness: drowsy  Oxygen Supplementation: room air    Independent Airway: airway patency satisfactory and stable  Dentition: dentition unchanged  Vital Signs Stable: post-procedure vital signs reviewed and stable  Report to RN Given: handoff report given  Patient transferred to: Phase II    Handoff Report: Identifed the Patient, Identified the Reponsible Provider, Reviewed the pertinent medical history, Discussed the surgical course, Reviewed Intra-OP anesthesia mangement and issues during anesthesia, Set expectations for post-procedure period and Allowed opportunity for questions and acknowledgement of understanding      Vitals:  Vitals Value Taken Time   /78    Temp 98    Pulse 77    Resp 14    SpO2 96        Electronically Signed By: BATSHEVA Zacarias CRNA  March 15, 2023  11:38 AM

## 2023-03-15 NOTE — ANESTHESIA PREPROCEDURE EVALUATION
Anesthesia Pre-Procedure Evaluation    Patient: Savanna Rehman   MRN: [id number] : 1940        Procedure : Procedure(s):  COLONOSCOPY          Past Medical History:   Diagnosis Date     Anemia     Iron Deficiency anemia     Atrial fibrillation (H)      CAD (coronary artery disease)     non-obstructive     Chronic pain     neck, low back, legs     Congestive heart failure (H)      Degenerative disk disease      Fibromyalgia      Gastro-oesophageal reflux disease      Gout      Hiatal hernia      Mumps      Neuropathy      CRISTIANA (obstructive sleep apnea) - CPAP      Palpitations      Pernicious anemia      Sleep apnea     uses CPAP.     Urinary incontinence      Vitamin D deficiency       Past Surgical History:   Procedure Laterality Date     APPENDECTOMY       CHOLECYSTECTOMY       COLONOSCOPY  3/15/2011     CORONARY ANGIOGRAPHY ADULT ORDER       CYSTOSCOPY, BIOPSY BLADDER, COMBINED N/A 2020    Procedure: CYSTOSCOPY, WITH BLADDER BIOPSY;  Surgeon: Deisy Wilson MD;  Location: [facility]     HEART CATH LEFT HEART CATH  16    medication management     HYSTERECTOMY TOTAL ABDOMINAL       Knee replacement NOS Left      LAPAROSCOPIC NISSEN FUNDOPLICATION N/A 2015    Procedure: LAPAROSCOPIC NISSEN FUNDOPLICATION;  Surgeon: Armando Ansari MD;  Location: [facility]     TONSILLECTOMY       TRANSPOSITION ULNAR NERVE (ELBOW)        Allergies   Allergen Reactions     Augmented Betamethasone Diprop [Betamethasone] Other (See Comments)     Severe yeast infection     Petroleum Jelly [Petrolatum] Anaphylaxis     Rash and swelling     Shellfish-Derived Products Anaphylaxis     Tongue swelling     Aspirin Swelling     tiongue swelling     Bacitracin      Rash swelling     Bactrim [Sulfamethoxazole W/Trimethoprim] Dizziness     Coumadin [Warfarin] Swelling     Leg swelling     Darvon [Propoxyphene] Swelling     Throat closes     Dilaudid [Hydromorphone]      Levaquin [Levofloxacin] Swelling     Tongue swelling      Neomycin Swelling     rash     Neosporin [Neomycin-Polymyx-Gramicid] Swelling     rash     Nitrofurantoin      SOB, GI upset,     Oxycodone      Severe itching     Percodan [Oxycodone-Aspirin]      Severe itching     Tramadol      Vicodin [Hydrocodone-Acetaminophen]      Severe itching       Xarelto [Rivaroxaban]      Adhesive Tape Rash     Band aids      Codeine Rash     Hydrocortisone Rash and Swelling     Other Environmental Allergy Rash     Adhesive tape   Band aids       Social History     Tobacco Use     Smoking status: Former     Packs/day: 0.50     Years: 50.00     Pack years: 25.00     Types: Cigarettes     Quit date: 2014     Years since quittin.5     Passive exposure: Past     Smokeless tobacco: Never   Substance Use Topics     Alcohol use: Yes     Comment: socially      Wt Readings from Last 1 Encounters:   23 113.7 kg (250 lb 11.2 oz)        Anesthesia Evaluation   Pt has had prior anesthetic.     No history of anesthetic complications       ROS/MED HX  ENT/Pulmonary:     (+) sleep apnea,  (-) tobacco use and COPD   Neurologic:    (-) no seizures and no CVA   Cardiovascular:     (+) Dyslipidemia --CAD ---CHF  (-) hypertension   METS/Exercise Tolerance:     Hematologic:       Musculoskeletal:       GI/Hepatic:     (+) GERD, Asymptomatic on medication, hiatal hernia,  (-) liver disease   Renal/Genitourinary:     (+) renal disease, type: CRI,     Endo:     (+) type II DM, Obesity,  (-) thyroid disease   Psychiatric/Substance Use:     (+) psychiatric history anxiety and depression     Infectious Disease:       Malignancy:       Other:      (+) , H/O Chronic Pain,        Physical Exam    Airway        Mallampati: II   TM distance: > 3 FB   Neck ROM: full   Mouth opening: > 3 cm    Respiratory Devices and Support         Dental       (+) Modest Abnormalities - crowns, retainers, 1 or 2 missing teeth      Cardiovascular   cardiovascular exam normal          Pulmonary   pulmonary exam normal                 OUTSIDE LABS:  CBC:   Lab Results   Component Value Date    WBC 6.1 02/06/2023    WBC 4.0 11/07/2022    HGB 14.9 02/06/2023    HGB 15.2 11/07/2022    HCT 44.9 02/06/2023    HCT 46.0 11/07/2022     02/06/2023     11/07/2022     BMP:   Lab Results   Component Value Date     02/06/2023     01/17/2023    POTASSIUM 4.4 02/06/2023    POTASSIUM 4.3 01/17/2023    CHLORIDE 98 02/06/2023    CHLORIDE 99 01/17/2023    CO2 30 (H) 02/06/2023    CO2 22 01/17/2023    BUN 22.1 02/06/2023    BUN 14.7 01/17/2023    CR 0.88 02/06/2023    CR 0.96 (H) 01/17/2023     (H) 02/06/2023     (H) 01/17/2023     COAGS:   Lab Results   Component Value Date    INR 2.2 (A) 02/28/2023     POC:   Lab Results   Component Value Date     (H) 07/13/2020     HEPATIC:   Lab Results   Component Value Date    ALBUMIN 3.8 11/07/2022    PROTTOTAL 8.1 11/07/2022    ALT 37 11/07/2022    AST 55 (H) 11/07/2022    ALKPHOS 115 11/07/2022    BILITOTAL 1.0 11/07/2022     OTHER:   Lab Results   Component Value Date    LACT 1.7 01/27/2020    A1C 7.3 (H) 02/06/2023    SAUL 9.9 02/06/2023    MAG 1.8 01/01/2017    TSH 0.76 11/07/2022       Anesthesia Plan    ASA Status:  3   NPO Status:  NPO Appropriate    Anesthesia Type: MAC.     - Reason for MAC: straight local not clinically adequate              Consents    Anesthesia Plan(s) and associated risks, benefits, and realistic alternatives discussed. Questions answered and patient/representative(s) expressed understanding.    - Discussed:     - Discussed with:  Patient         Postoperative Care    Pain management: Multi-modal analgesia.   PONV prophylaxis: Ondansetron (or other 5HT-3)     Comments:                Luis Vargas MD

## 2023-03-15 NOTE — ANESTHESIA POSTPROCEDURE EVALUATION
Patient: Savanna Rehman    Procedure: Procedure(s):  COLONOSCOPY, WITH POLYPECTOMY AND BIOPSY  COLONOSCOPY, FLEXIBLE, WITH LESION REMOVAL USING SNARE       Anesthesia Type:  MAC    Note:     Postop Pain Control: Uneventful            Sign Out: Well controlled pain   PONV: No   Neuro/Psych: Uneventful            Sign Out: Acceptable/Baseline neuro status   Airway/Respiratory: Uneventful            Sign Out: Acceptable/Baseline resp. status   CV/Hemodynamics: Uneventful            Sign Out: Acceptable CV status; No obvious hypovolemia; No obvious fluid overload   Other NRE: NONE   DID A NON-ROUTINE EVENT OCCUR? No           Last vitals:  Vitals Value Taken Time   /72 03/15/23 1152   Temp     Pulse 62 03/15/23 1202   Resp 15 03/15/23 1202   SpO2 95 % 03/15/23 1202   Vitals shown include unvalidated device data.    Electronically Signed By: Luis Vargas MD  March 15, 2023  12:46 PM

## 2023-03-15 NOTE — H&P
History and physical examination reviewed\  Will proceed with colonoscopy under MAC for evaluation of diarrhea    REGINA MERCADO MD

## 2023-03-15 NOTE — PROGRESS NOTES
ANTICOAGULATION  MANAGEMENT: Discharge Review    Savanna Rehman chart reviewed for anticoagulation continuity of care    Outpatient surgery/procedure on 3/15/23 for colonscopy.    Discharge disposition: Home    Results:    No results for input(s): INR, [id number], F2, ALMWH, AAUFH in the last 168 hours.  Anticoagulation inpatient management:     not applicable     Anticoagulation discharge instructions:     Warfarin dosing: See procedure plan from 2/28/23. 5 on 3/15/23, then resume maintenance dose. Recheck INR on 3/22/23    Bridging: No   INR goal change: No      Medication changes affecting anticoagulation: No    Additional factors affecting anticoagulation: No     PLAN     No adjustment to anticoagulation plan needed    Patient not contacted    No adjustment to Anticoagulation Calendar was required    Dorothy River RN

## 2023-03-16 LAB
PATH REPORT.COMMENTS IMP SPEC: NORMAL
PATH REPORT.COMMENTS IMP SPEC: NORMAL
PATH REPORT.FINAL DX SPEC: NORMAL
PATH REPORT.GROSS SPEC: NORMAL
PATH REPORT.MICROSCOPIC SPEC OTHER STN: NORMAL
PATH REPORT.RELEVANT HX SPEC: NORMAL
PHOTO IMAGE: NORMAL

## 2023-03-17 ENCOUNTER — TELEPHONE (OUTPATIENT)
Dept: OBGYN | Facility: CLINIC | Age: 83
End: 2023-03-17
Payer: COMMERCIAL

## 2023-03-17 NOTE — TELEPHONE ENCOUNTER
[facility] Call Center    Phone Message    May a detailed message be left on voicemail: yes     Reason for Call: Pt received a message to sign in on my chart for appt 3/22 pt would like a call to discuss as she doesn't have my chart and wants to make sure everything is set for appt.   Thank you    Action Taken: Message routed to:  Other: Whs    Travel Screening: Not Applicable

## 2023-03-21 ENCOUNTER — ANTICOAGULATION THERAPY VISIT (OUTPATIENT)
Dept: ANTICOAGULATION | Facility: CLINIC | Age: 83
End: 2023-03-21
Payer: COMMERCIAL

## 2023-03-21 DIAGNOSIS — I48.20 CHRONIC ATRIAL FIBRILLATION (H): ICD-10-CM

## 2023-03-21 DIAGNOSIS — I48.21 PERMANENT ATRIAL FIBRILLATION (H): Primary | ICD-10-CM

## 2023-03-21 DIAGNOSIS — Z79.01 LONG TERM (CURRENT) USE OF ANTICOAGULANTS: ICD-10-CM

## 2023-03-21 NOTE — PROGRESS NOTES
Received a VM from [facility] nurse. Patent was to be seen today by her by Patient changed appt to Thursday of this week. INR will be done on Thursday.  Azul Whitaker RN  Anticoagulation Nurse - [facility], [place]

## 2023-03-22 ENCOUNTER — VIRTUAL VISIT (OUTPATIENT)
Dept: PHYSICAL MEDICINE AND REHAB | Facility: CLINIC | Age: 83
End: 2023-03-22
Payer: COMMERCIAL

## 2023-03-22 DIAGNOSIS — U09.9 POST COVID-19 CONDITION, UNSPECIFIED: ICD-10-CM

## 2023-03-22 DIAGNOSIS — G47.19 EXCESSIVE DAYTIME SLEEPINESS: Primary | ICD-10-CM

## 2023-03-22 DIAGNOSIS — R41.3 MEMORY CHANGES: ICD-10-CM

## 2023-03-22 DIAGNOSIS — R43.0 ANOSMIA: ICD-10-CM

## 2023-03-22 PROCEDURE — 99204 OFFICE O/P NEW MOD 45 MIN: CPT | Mod: VID | Performed by: INTERNAL MEDICINE

## 2023-03-22 SDOH — SOCIAL STABILITY: SOCIAL NETWORK: I HAVE TROUBLE DOING ALL OF MY USUAL WORK (INCLUDE WORK AT HOME): SOMETIMES

## 2023-03-22 SDOH — SOCIAL STABILITY: SOCIAL NETWORK

## 2023-03-22 SDOH — SOCIAL STABILITY: SOCIAL NETWORK: I HAVE TROUBLE DOING ALL OF THE FAMILY ACTIVITIES THAT I WANT TO DO: NEVER

## 2023-03-22 SDOH — SOCIAL STABILITY: SOCIAL NETWORK: I HAVE TROUBLE DOING ALL OF MY REGULAR LEISURE ACTIVITIES WITH OTHERS: NEVER

## 2023-03-22 SDOH — SOCIAL STABILITY: SOCIAL NETWORK: I HAVE TROUBLE DOING ALL OF THE ACTIVITIES WITH FRIENDS THAT I WANT TO DO: NEVER

## 2023-03-22 SDOH — SOCIAL STABILITY: SOCIAL NETWORK: PROMIS ABILITY TO PARTICIPATE IN SOCIAL ROLES & ACTIVITIES T-SCORE: 55.6

## 2023-03-22 ASSESSMENT — ENCOUNTER SYMPTOMS
JOINT SWELLING: 0
DEPRESSION: 1
FLANK PAIN: 0
SKIN CHANGES: 0
JAUNDICE: 0
HOARSE VOICE: 0
LOSS OF CONSCIOUSNESS: 0
MUSCLE WEAKNESS: 1
NUMBNESS: 1
SMELL DISTURBANCE: 0
EYE PAIN: 1
BOWEL INCONTINENCE: 0
STIFFNESS: 0
ARTHRALGIAS: 0
DYSURIA: 0
PANIC: 0
HEADACHES: 1
DIFFICULTY URINATING: 0
SINUS PAIN: 0
MUSCLE CRAMPS: 1
SPEECH CHANGE: 0
HOT FLASHES: 0
HEMATURIA: 0
ABDOMINAL PAIN: 0
DIARRHEA: 1
VOMITING: 1
DECREASED CONCENTRATION: 0
TREMORS: 1
SORE THROAT: 0
POOR WOUND HEALING: 0
EYE WATERING: 0
BLOOD IN STOOL: 0
BACK PAIN: 1
HEARTBURN: 1
TASTE DISTURBANCE: 0
DECREASED LIBIDO: 0
PARALYSIS: 0
SINUS CONGESTION: 0
MYALGIAS: 1
TROUBLE SWALLOWING: 0
INSOMNIA: 0
TINGLING: 0
EYE IRRITATION: 1
NERVOUS/ANXIOUS: 1
DISTURBANCES IN COORDINATION: 0
RECTAL PAIN: 0
NAIL CHANGES: 0
DIZZINESS: 1
DOUBLE VISION: 0
SEIZURES: 0
WEAKNESS: 1
NAUSEA: 1
MEMORY LOSS: 0
NECK PAIN: 1
BLOATING: 0
CONSTIPATION: 0
NECK MASS: 0
EYE REDNESS: 0

## 2023-03-22 ASSESSMENT — PATIENT HEALTH QUESTIONNAIRE - PHQ9
SUM OF ALL RESPONSES TO PHQ QUESTIONS 1-9: 14
10. IF YOU CHECKED OFF ANY PROBLEMS, HOW DIFFICULT HAVE THESE PROBLEMS MADE IT FOR YOU TO DO YOUR WORK, TAKE CARE OF THINGS AT HOME, OR GET ALONG WITH OTHER PEOPLE: SOMEWHAT DIFFICULT
SUM OF ALL RESPONSES TO PHQ QUESTIONS 1-9: 14

## 2023-03-22 ASSESSMENT — ANXIETY QUESTIONNAIRES
IF YOU CHECKED OFF ANY PROBLEMS ON THIS QUESTIONNAIRE, HOW DIFFICULT HAVE THESE PROBLEMS MADE IT FOR YOU TO DO YOUR WORK, TAKE CARE OF THINGS AT HOME, OR GET ALONG WITH OTHER PEOPLE: SOMEWHAT DIFFICULT
4. TROUBLE RELAXING: NOT AT ALL
2. NOT BEING ABLE TO STOP OR CONTROL WORRYING: SEVERAL DAYS
6. BECOMING EASILY ANNOYED OR IRRITABLE: NOT AT ALL
GAD7 TOTAL SCORE: 3
3. WORRYING TOO MUCH ABOUT DIFFERENT THINGS: SEVERAL DAYS
7. FEELING AFRAID AS IF SOMETHING AWFUL MIGHT HAPPEN: NOT AT ALL
5. BEING SO RESTLESS THAT IT IS HARD TO SIT STILL: NOT AT ALL
1. FEELING NERVOUS, ANXIOUS, OR ON EDGE: SEVERAL DAYS
7. FEELING AFRAID AS IF SOMETHING AWFUL MIGHT HAPPEN: NOT AT ALL
8. IF YOU CHECKED OFF ANY PROBLEMS, HOW DIFFICULT HAVE THESE MADE IT FOR YOU TO DO YOUR WORK, TAKE CARE OF THINGS AT HOME, OR GET ALONG WITH OTHER PEOPLE?: SOMEWHAT DIFFICULT

## 2023-03-22 NOTE — PATIENT INSTRUCTIONS
Scent training:  Assessment of sense of smell and taste  Smell training:  Flowery - Phoebe  Fruity - Lemon  Spicy - Cloves  Resinous - Eucalyptus      1 - Pour a few droplets of one of the oils on to a cotton pad or ball  2 - Do not try to sniff the pad immediately; leave it for a few minutes for the fragrance to develop  3 - Hold the first stick/pad up to your nose, about an inch away.  The order in which you test the oils does not matter  4 - Relax and try to inhale naturally through the nose - sniffing too quickly and deeply is likely to result in you not being able to detect anything  5 - Try this a couple more times, then rest for five minutes  6 - Move on to the next oil and repeat as above.    Repeat this training daily for 6 months.    Patient resources:  https://abscent.org/learn-us/smell-training

## 2023-03-22 NOTE — NURSING NOTE
Is the patient currently in the state of [place]? YES    Visit mode: Telephone     If the visit is dropped, the patient can be reconnected by: TELEPHONE VISIT: Phone number: [phone]    Will anyone else be joining the visit? NO      How would you like to obtain your AVS? Mail a copy    Are changes needed to the allergy or medication list? NO    Reason for visit: follow up- memory changes     Pt is taking Zoloft- pt had to take less dosage- pt was having bad migraines and headaches.  Pt is talking to PCP today about it.      Pain in back and buttocks -pain level 7.  PT fell 2 nights ago- and in December.  Pt fell on her back and buttocks.  Pt is stiff and sore.  Paramedics came to her home and helped her, and checked her out.     Depression Response    Patient completed the PHQ-9 assessment for depression and scored >9? Yes  Question 9 on the PHQ-9 was positive for suicidality? No  Does patient have current mental health provider? No    Is this a virtual visit? Yes   Does patient have suicidal ideation (positive question 9)? No - offer to place Mental Health Referral.  Patient declined referral/not needed    I personally notified the following: visit provider    PATIENT DECLINED TRIAGE AND MENTAL HEALTH REFERRAL.    Pt states that she is very interested in the covid long hauler information.     Pt has sleep apnea- so she snores due to that.    Loreto Marshall on 3/22/2023 at 10:02 AM

## 2023-03-22 NOTE — LETTER
"3/22/2023       RE: Savanna Rehman  [address]  [place] [address]     Dear Colleague,    Thank you for referring your patient, Savanna Rehman, to the [facility] [place] [facility]. Please see a copy of my visit note below.    Savanna is a 82 year old who is being evaluated via a billable video visit.      How would you like to obtain your AVS? Mail a copy  If the video visit is dropped, the invitation should be resent by: Text to cell phone: [phone]  Will anyone else be joining your video visit? No        Assessment & Plan     Memory changes  Reviewed possible causes for memory changes with patient.  Fatigue tends to be one of her prominent symptoms.  Discussed impact of fatigue on cognitive performance with patient.  Recommend evaluation of memory changes per routine protocol, including neuropsychological testing to exclude other causes.  Patient will follow-up with her primary care provider for additional evaluation.  May also recommend speech therapy for cognitive rehab.  - Adult Post Covid Clinic Referral        Excessive daytime sleepiness  Discussed sleep deprivation is one of the causes of cognitive issues.  Recommend sleep medicine consultation to exclude sleep apnea.  Referral was placed today.  - Adult Sleep Eval & Management Referral; Future    Anosmia  Discussed post-COVID anosmia with patient, advised on return of sense of taste and smell over time in many patients with ongoing issues in other patients.  Recommend ENT consultation.  - Adult ENT  Referral; Future      I spent a total of 45 minutes on the day of the visit.   Time spent doing chart review, history and exam, documentation and further activities per the note       BMI:   Estimated body mass index is 37.71 kg/m  as calculated from the following:    Height as of 3/15/23: 1.727 m (5' 8\").    Weight " as of 3/15/23: 112.5 kg (248 lb).       No follow-ups on file.    Allison Mack MD  [facility] SAM Corrales is a 82 year old, presenting for the following health issues:  No chief complaint on file.  No chief complaint on file.    HPI     Patient scheduled a visit to discuss some of the health issues related to COVID-19    History of COVID-19 infection: Patient reports that she started to have symptoms in July. She states that it was her second time of having COVID. She reports that she had a positive COVID test at home because she was exposed to COVID and was tested per protocol. She states that she lost her smell and taste in July, however, did not realize that until August.   She reports that she had a fall in December, and had another fall more recently. She has diabetes and urinary incontinence. She has noticed some issues with memory as well. She initially started to have issues in August. She states that at the time she started to have issues with completing her sentences. This symptom has improved, however, she is still having this problem.     Current concerns: Health Concerns    PHQ Assesment Total Score(s) 3/22/2023   PHQ-9 Score 14   Some recent data might be hidden     LAURIE-7 Results 3/22/2023   LAURIE 7 TOTAL SCORE 3 (minimal anxiety)   Some recent data might be hidden     PTSD Screen Score 3/22/2023   Have you ever experienced this kind of event? No   Some recent data might be hidden     PROMIS-29 3/22/2023   PROMIS Physical Function T-Score 30.7 (moderate dysfunction)   PROMIS Anxiety T-Score 53.7 (within normal limits)   PROMIS Depression T-Score 53.9 (within normal limits)   PROMIS Fatigue T-Score 51 (within normal limits)   PROMIS Sleep Disturbance T-Score 52.4 (within normal limits)   PROMIS Ability to Participate in Social Roles & Activities T-Score 55.6 (within normal limits)   PROMIS Pain Interference T-Score 59.9  (mild)   PROMIS Pain Intensity 7           Past Medical History:   Diagnosis Date     Anemia     Iron Deficiency anemia     Atrial fibrillation (H)      CAD (coronary artery disease)     non-obstructive     Chronic pain     neck, low back, legs     Congestive heart failure (H)      Degenerative disk disease      Diabetes (H)      Fibromyalgia      Gastro-oesophageal reflux disease      Gout      Hiatal hernia      Mumps      Neuropathy      CRISTIANA (obstructive sleep apnea) - CPAP      Palpitations      Pernicious anemia      Sleep apnea     uses CPAP.     Urinary incontinence      Vitamin D deficiency        Past Surgical History:   Procedure Laterality Date     APPENDECTOMY       CHOLECYSTECTOMY       COLONOSCOPY  3/15/2011     COLONOSCOPY N/A 3/15/2023    Procedure: COLONOSCOPY, WITH POLYPECTOMY AND BIOPSY;  Surgeon: Maci Coronel MD;  Location:  GI     COLONOSCOPY N/A 3/15/2023    Procedure: COLONOSCOPY, FLEXIBLE, WITH LESION REMOVAL USING SNARE;  Surgeon: Maci Coronel MD;  Location:  GI     CORONARY ANGIOGRAPHY ADULT ORDER       CYSTOSCOPY, BIOPSY BLADDER, COMBINED N/A 7/13/2020    Procedure: CYSTOSCOPY, WITH BLADDER BIOPSY;  Surgeon: Deisy Wilson MD;  Location:  OR     HEART CATH LEFT HEART CATH  12/30/16    medication management     HYSTERECTOMY TOTAL ABDOMINAL       Knee replacement NOS Left      LAPAROSCOPIC NISSEN FUNDOPLICATION N/A 2/4/2015    Procedure: LAPAROSCOPIC NISSEN FUNDOPLICATION;  Surgeon: Armando Ansari MD;  Location:  OR     TONSILLECTOMY       TRANSPOSITION ULNAR NERVE (ELBOW)         Family History   Problem Relation Age of Onset     Alzheimer Disease Mother      Lung Cancer Father      No Known Problems Brother      No Known Problems Brother      No Known Problems Brother      Unknown/Adopted No family hx of        Social History     Tobacco Use     Smoking status: Former     Packs/day: 0.50     Years: 50.00     Pack years: 25.00     Types: Cigarettes      Quit date: 2014     Years since quittin.5     Passive exposure: Past     Smokeless tobacco: Never   Vaping Use     Vaping Use: Never used   Substance Use Topics     Alcohol use: Yes     Comment: couple a month     Drug use: Never         Current Outpatient Medications:      alcohol swab prep pads, Use to swab area of injection/chandrakant as directed., Disp: 100 each, Rfl: 3     blood glucose (NO BRAND SPECIFIED) lancets standard, Use to test blood sugar  as directed., Disp: 1 each, Rfl: 0     blood glucose (NO BRAND SPECIFIED) lancing device, Device to be used with lancets. Patient requests Amol Microlet 2 lancing device to check blood glucose once per day., Disp: 1 each, Rfl: 0     blood glucose (ONETOUCH ULTRA) test strip, Use to test blood sugar 1 times daily, Disp: 100 strip, Rfl: 1     blood glucose calibration (NO BRAND SPECIFIED) solution, Use to calibrate blood glucose monitor, Disp: 1 Bottle, Rfl: 3     blood glucose monitoring (NO BRAND SPECIFIED) meter device kit, Use to test blood sugar 1 times daily or as directed., Disp: 1 kit, Rfl: 1     blood glucose monitoring (NO BRAND SPECIFIED) meter device kit, Use to test blood sugar 1 time daily, Disp: 1 kit, Rfl: 0     butalbital-aspirin-caffeine (FIORINAL) -40 MG capsule, TAKE 1 CAPSULE BY MOUTH EVERY 6 HOURS AS NEEDED FOR HEADACHE, Disp: 30 capsule, Rfl: 0     EPINEPHrine (ANY BX GENERIC EQUIV) 0.3 MG/0.3ML injection 2-pack, INJECT 0.3MLS (0.3MG) INTO THE MUSCLE ONCE AS NEEDED FOR ANAPHYLAXIS, Disp: 2 each, Rfl: 1     glipiZIDE (GLUCOTROL XL) 2.5 MG 24 hr tablet, Take 1 tablet (2.5 mg) by mouth 2 times daily, Disp: 180 tablet, Rfl: 3     meclizine (ANTIVERT) 12.5 MG tablet, TAKE 1 TABLET BY MOUTH THREE TIMES DAILY AS NEEDED FOR DIZZINESS, Disp: 30 tablet, Rfl: 1     sertraline (ZOLOFT) 50 MG tablet, Take 1 tablet (50 mg) by mouth daily, Disp: 90 tablet, Rfl: 1     trospium (SANCTURA XR) 60 MG CP24 24 hr capsule, Take 1 capsule (60 mg) by mouth  every morning, Disp: 30 capsule, Rfl: 3     warfarin ANTICOAGULANT (COUMADIN) 2.5 MG tablet, TAKE 2.5 mg (2.5 mg x 1) every Tue, Thu, Sat; 3.75 mg (2.5 mg x 1.5) all other days or as directed., Disp: 135 tablet, Rfl: 1     ACE/ARB/ARNI NOT PRESCRIBED (INTENTIONAL), Please choose reason not prescribed, below, Disp: , Rfl:      bisacodyl (DULCOLAX) 5 MG EC tablet, Take 2 tablets at 3 pm the day before your procedure. If your procedure is before 11 am, take 2 additional tablets at 11 pm. If your procedure is after 11 am, take 2 additional tablets at 6 am. For additional instructions refer to your colonoscopy prep instructions. (Patient not taking: Reported on 3/22/2023), Disp: 4 tablet, Rfl: 0     polyethylene glycol (GOLYTELY) 236 g suspension, The night before the exam at 6 pm drink an 8-ounce glass every 15 minutes until the jug is half empty. If you arrive before 11 AM: Drink the other half of the Golytely jug at 11 PM night before procedure. If you arrive after 11 AM: Drink the other half of the Golytely jug at 6 AM day of procedure. For additional instructions refer to your colonoscopy prep instructions. (Patient not taking: Reported on 3/22/2023), Disp: 4000 mL, Rfl: 0  No current facility-administered medications for this visit.    Facility-Administered Medications Ordered in Other Visits:      nitroglycerin 100 MCG/ML injection, , , ,       Review of Systems   HENT: Negative for ear discharge, ear pain, hearing loss, mouth sores, nosebleeds, sinus pain, sore throat, tinnitus and trouble swallowing.    Eyes: Positive for pain. Negative for redness.   Gastrointestinal: Positive for diarrhea, heartburn, nausea and vomiting. Negative for abdominal pain, constipation and rectal pain.   Genitourinary: Positive for urgency. Negative for difficulty urinating, dyspareunia, dysuria, flank pain, genital sores, hematuria and vaginal discharge.   Musculoskeletal: Positive for back pain, myalgias and neck pain. Negative for  arthralgias and joint swelling.   Skin: Negative for rash.   Neurological: Positive for dizziness, tremors, weakness, numbness and headaches. Negative for seizures.   Psychiatric/Behavioral: Negative for decreased concentration. The patient is nervous/anxious.            Objective           Vitals:  No vitals were obtained today due to virtual visit.    Physical Exam   GENERAL:  alert and no distress  RESP: No audible wheeze, able to speak in full sentences    PSYCH: Mentation appears normal, judgement and insight intact, normal speech                Video-Visit Details    Type of service: Telephone visit  Call duration: 40 minutes    Originating Location (pt. Location): Home  Distant Location (provider location):  Off-site  Platform used for Video Visit: Gila              Again, thank you for allowing me to participate in the care of your patient.      Sincerely,    Allison Mack MD

## 2023-03-22 NOTE — PROGRESS NOTES
"Savanna is a 82 year old who is being evaluated via a billable video visit.      How would you like to obtain your AVS? Mail a copy  If the video visit is dropped, the invitation should be resent by: Text to cell phone: [phone]  Will anyone else be joining your video visit? No        Assessment & Plan     Memory changes  Reviewed possible causes for memory changes with patient.  Fatigue tends to be one of her prominent symptoms.  Discussed impact of fatigue on cognitive performance with patient.  Recommend evaluation of memory changes per routine protocol, including neuropsychological testing to exclude other causes.  Patient will follow-up with her primary care provider for additional evaluation.  May also recommend speech therapy for cognitive rehab.  - Adult Post Covid Clinic Referral        Excessive daytime sleepiness  Discussed sleep deprivation is one of the causes of cognitive issues.  Recommend sleep medicine consultation to exclude sleep apnea.  Referral was placed today.  - Adult Sleep Eval & Management Referral; Future    Anosmia  Discussed post-COVID anosmia with patient, advised on return of sense of taste and smell over time in many patients with ongoing issues in other patients.  Recommend ENT consultation.  - Adult ENT  Referral; Future      I spent a total of 45 minutes on the day of the visit.   Time spent doing chart review, history and exam, documentation and further activities per the note       BMI:   Estimated body mass index is 37.71 kg/m  as calculated from the following:    Height as of 3/15/23: 1.727 m (5' 8\").    Weight as of 3/15/23: 112.5 kg (248 lb).       No follow-ups on file.    Allison Mack MD  [facility] PHYSICAL MEDICINE AND [facility]HABIL[facility]IN[facility] [place]    Mamadou Corrales is a 82 year old, presenting for the following health issues:  No chief complaint on file.  No chief complaint on file.    HPI     Patient scheduled a visit to discuss some " of the health issues related to COVID-19    History of COVID-19 infection: Patient reports that she started to have symptoms in July. She states that it was her second time of having COVID. She reports that she had a positive COVID test at home because she was exposed to COVID and was tested per protocol. She states that she lost her smell and taste in July, however, did not realize that until August.   She reports that she had a fall in December, and had another fall more recently. She has diabetes and urinary incontinence. She has noticed some issues with memory as well. She initially started to have issues in August. She states that at the time she started to have issues with completing her sentences. This symptom has improved, however, she is still having this problem.     Current concerns: Health Concerns    PHQ Assesment Total Score(s) 3/22/2023   PHQ-9 Score 14   Some recent data might be hidden     LAURIE-7 Results 3/22/2023   LAURIE 7 TOTAL SCORE 3 (minimal anxiety)   Some recent data might be hidden     PTSD Screen Score 3/22/2023   Have you ever experienced this kind of event? No   Some recent data might be hidden     PROMIS-29 3/22/2023   PROMIS Physical Function T-Score 30.7 (moderate dysfunction)   PROMIS Anxiety T-Score 53.7 (within normal limits)   PROMIS Depression T-Score 53.9 (within normal limits)   PROMIS Fatigue T-Score 51 (within normal limits)   PROMIS Sleep Disturbance T-Score 52.4 (within normal limits)   PROMIS Ability to Participate in Social Roles & Activities T-Score 55.6 (within normal limits)   PROMIS Pain Interference T-Score 59.9 (mild)   PROMIS Pain Intensity 7           Past Medical History:   Diagnosis Date     Anemia     Iron Deficiency anemia     Atrial fibrillation (H)      CAD (coronary artery disease)     non-obstructive     Chronic pain     neck, low back, legs     Congestive heart failure (H)      Degenerative disk disease      Diabetes (H)      Fibromyalgia       Gastro-oesophageal reflux disease      Gout      Hiatal hernia      Mumps      Neuropathy      CRISTIANA (obstructive sleep apnea) - CPAP      Palpitations      Pernicious anemia      Sleep apnea     uses CPAP.     Urinary incontinence      Vitamin D deficiency        Past Surgical History:   Procedure Laterality Date     APPENDECTOMY       CHOLECYSTECTOMY       COLONOSCOPY  3/15/2011     COLONOSCOPY N/A 3/15/2023    Procedure: COLONOSCOPY, WITH POLYPECTOMY AND BIOPSY;  Surgeon: Maci Coronel MD;  Location:  GI     COLONOSCOPY N/A 3/15/2023    Procedure: COLONOSCOPY, FLEXIBLE, WITH LESION REMOVAL USING SNARE;  Surgeon: Maci Coronel MD;  Location:  GI     CORONARY ANGIOGRAPHY ADULT ORDER       CYSTOSCOPY, BIOPSY BLADDER, COMBINED N/A 2020    Procedure: CYSTOSCOPY, WITH BLADDER BIOPSY;  Surgeon: Deisy Wilson MD;  Location: [facility] OR     HEART CATH LEFT HEART CATH  16    medication management     HYSTERECTOMY TOTAL ABDOMINAL       Knee replacement NOS Left      LAPAROSCOPIC NISSEN FUNDOPLICATION N/A 2015    Procedure: LAPAROSCOPIC NISSEN FUNDOPLICATION;  Surgeon: Armando Ansari MD;  Location:  OR     TONSILLECTOMY       TRANSPOSITION ULNAR NERVE (ELBOW)         Family History   Problem Relation Age of Onset     Alzheimer Disease Mother      Lung Cancer Father      No Known Problems Brother      No Known Problems Brother      No Known Problems Brother      Unknown/Adopted No family hx of        Social History     Tobacco Use     Smoking status: Former     Packs/day: 0.50     Years: 50.00     Pack years: 25.00     Types: Cigarettes     Quit date: 2014     Years since quittin.5     Passive exposure: Past     Smokeless tobacco: Never   Vaping Use     Vaping Use: Never used   Substance Use Topics     Alcohol use: Yes     Comment: couple a month     Drug use: Never         Current Outpatient Medications:      alcohol swab prep pads, Use to swab area of injection/chandrakant as  directed., Disp: 100 each, Rfl: 3     blood glucose (NO BRAND SPECIFIED) lancets standard, Use to test blood sugar  as directed., Disp: 1 each, Rfl: 0     blood glucose (NO BRAND SPECIFIED) lancing device, Device to be used with lancets. Patient requests "[employer] Microlet 2 lancing device to check blood glucose once per day., Disp: 1 each, Rfl: 0     blood glucose (ONETOUCH ULTRA) test strip, Use to test blood sugar 1 times daily, Disp: 100 strip, Rfl: 1     blood glucose calibration (NO BRAND SPECIFIED) solution, Use to calibrate blood glucose monitor, Disp: 1 Bottle, Rfl: 3     blood glucose monitoring (NO BRAND SPECIFIED) meter device kit, Use to test blood sugar 1 times daily or as directed., Disp: 1 kit, Rfl: 1     blood glucose monitoring (NO BRAND SPECIFIED) meter device kit, Use to test blood sugar 1 time daily, Disp: 1 kit, Rfl: 0     butalbital-aspirin-caffeine (FIORINAL) -40 MG capsule, TAKE 1 CAPSULE BY MOUTH EVERY 6 HOURS AS NEEDED FOR HEADACHE, Disp: 30 capsule, Rfl: 0     EPINEPHrine (ANY BX GENERIC EQUIV) 0.3 MG/0.3ML injection 2-pack, INJECT 0.3MLS (0.3MG) INTO THE MUSCLE ONCE AS NEEDED FOR ANAPHYLAXIS, Disp: 2 each, Rfl: 1     glipiZIDE (GLUCOTROL XL) 2.5 MG 24 hr tablet, Take 1 tablet (2.5 mg) by mouth 2 times daily, Disp: 180 tablet, Rfl: 3     meclizine (ANTIVERT) 12.5 MG tablet, TAKE 1 TABLET BY MOUTH THREE TIMES DAILY AS NEEDED FOR DIZZINESS, Disp: 30 tablet, Rfl: 1     sertraline (ZOLOFT) 50 MG tablet, Take 1 tablet (50 mg) by mouth daily, Disp: 90 tablet, Rfl: 1     trospium (SANCTURA XR) 60 MG CP24 24 hr capsule, Take 1 capsule (60 mg) by mouth every morning, Disp: 30 capsule, Rfl: 3     warfarin ANTICOAGULANT (COUMADIN) 2.5 MG tablet, TAKE 2.5 mg (2.5 mg x 1) every Tue, Thu, Sat; 3.75 mg (2.5 mg x 1.5) all other days or as directed., Disp: 135 tablet, Rfl: 1     ACE/ARB/ARNI NOT PRESCRIBED (INTENTIONAL), Please choose reason not prescribed, below, Disp: , Rfl:      bisacodyl (DULCOLAX)  5 MG EC tablet, Take 2 tablets at 3 pm the day before your procedure. If your procedure is before 11 am, take 2 additional tablets at 11 pm. If your procedure is after 11 am, take 2 additional tablets at 6 am. For additional instructions refer to your colonoscopy prep instructions. (Patient not taking: Reported on 3/22/2023), Disp: 4 tablet, Rfl: 0     polyethylene glycol (GOLYTELY) 236 g suspension, The night before the exam at 6 pm drink an 8-ounce glass every 15 minutes until the jug is half empty. If you arrive before 11 AM: Drink the other half of the Golytely jug at 11 PM night before procedure. If you arrive after 11 AM: Drink the other half of the Golytely jug at 6 AM day of procedure. For additional instructions refer to your colonoscopy prep instructions. (Patient not taking: Reported on 3/22/2023), Disp: 4000 mL, Rfl: 0  No current facility-administered medications for this visit.    Facility-Administered Medications Ordered in Other Visits:      nitroglycerin 100 MCG/ML injection, , , ,       Review of Systems   HENT: Negative for ear discharge, ear pain, hearing loss, mouth sores, nosebleeds, sinus pain, sore throat, tinnitus and trouble swallowing.    Eyes: Positive for pain. Negative for redness.   Gastrointestinal: Positive for diarrhea, heartburn, nausea and vomiting. Negative for abdominal pain, constipation and rectal pain.   Genitourinary: Positive for urgency. Negative for difficulty urinating, dyspareunia, dysuria, flank pain, genital sores, hematuria and vaginal discharge.   Musculoskeletal: Positive for back pain, myalgias and neck pain. Negative for arthralgias and joint swelling.   Skin: Negative for rash.   Neurological: Positive for dizziness, tremors, weakness, numbness and headaches. Negative for seizures.   Psychiatric/Behavioral: Negative for decreased concentration. The patient is nervous/anxious.             Objective           Vitals:  No vitals were obtained today due to virtual  visit.    Physical Exam   GENERAL:  alert and no distress  RESP: No audible wheeze, able to speak in full sentences    PSYCH: Mentation appears normal, judgement and insight intact, normal speech                 Video-Visit Details    Type of service: Telephone visit  Call duration: 40 minutes    Originating Location (pt. Location): Home  Distant Location (provider location):  Off-site  Platform used for Video Visit: Gila

## 2023-03-22 NOTE — RESULT ENCOUNTER NOTE
No evidence of colitis on biopsies.  Recommend surveillance colonoscopy in 5 years, pending medical stability.  Will defer to primary care physician for that determination.

## 2023-03-23 ENCOUNTER — ANTICOAGULATION THERAPY VISIT (OUTPATIENT)
Dept: ANTICOAGULATION | Facility: CLINIC | Age: 83
End: 2023-03-23
Payer: COMMERCIAL

## 2023-03-23 ENCOUNTER — TELEPHONE (OUTPATIENT)
Dept: UROLOGY | Facility: CLINIC | Age: 83
End: 2023-03-23
Payer: COMMERCIAL

## 2023-03-23 DIAGNOSIS — I48.20 CHRONIC ATRIAL FIBRILLATION (H): ICD-10-CM

## 2023-03-23 DIAGNOSIS — Z79.01 LONG TERM (CURRENT) USE OF ANTICOAGULANTS: ICD-10-CM

## 2023-03-23 DIAGNOSIS — I48.21 PERMANENT ATRIAL FIBRILLATION (H): Primary | ICD-10-CM

## 2023-03-23 LAB — INR (EXTERNAL): 1.7 (ref 0.9–1.1)

## 2023-03-23 NOTE — TELEPHONE ENCOUNTER
Return to previous dose. If dizziness continues patient should be seen in clinic or UC    Sushant Escobar NP

## 2023-03-23 NOTE — PROGRESS NOTES
ANTICOAGULATION MANAGEMENT     Savanna Rehman 82 year old female is on warfarin with subtherapeutic INR result. (Goal INR 2.0-3.0)    Recent labs: (last 7 days)     03/23/23  1209   INR 1.7*       ASSESSMENT       Source(s): Chart Review and Home Care/Facility Nurse       Warfarin doses taken: Held for colonoscopy  recently which may be affecting INR    Diet: No new diet changes identified    New illness, injury, or hospitalization: Yes: Fall on Monday in kitchen landing on buttocks.  Did not hit head or LOC, no known injuries but is sore around waist line and hips    Medication/supplement changes: None noted    Signs or symptoms of bleeding or clotting: No    Previous INR: Therapeutic last 2(+) visits    Additional findings: None         PLAN     Recommended plan for temporary change(s) affecting INR     Dosing Instructions: Continue your current warfarin dose with next INR in 5 days at next home care visit.     Summary  As of 3/23/2023    Full warfarin instructions:  3.75 mg every Tue, Thu, Sat; 2.5 mg all other days   Next INR check:  3/28/2023             Telephone call with Raj home care nurse who verbalizes understanding and agrees to plan    Orders given to  Homecare nurse/facility to recheck    Education provided:     Please call back if any changes to your diet, medications or how you've been taking warfarin    Plan made per [facility] anticoagulation protocol    Roya Sky RN  Anticoagulation Clinic  3/23/2023    _______________________________________________________________________     Anticoagulation Episode Summary     Current INR goal:  2.0-3.0   TTR:  63.9 % (1 y)   Target end date:  Indefinite   Send INR reminders to:  NIKKI CHASE    Indications    Permanent atrial fibrillation (H) [I48.21]  Chronic atrial fibrillation (H) [I48.20]  Long term (current) use of anticoagulants [Z79.01]           Comments:  Home care          Anticoagulation Care Providers     Provider Role Specialty Phone  number    Patti Suárez MD Referring Internal Medicine [phone]

## 2023-03-23 NOTE — TELEPHONE ENCOUNTER
[facility] Call Center    Phone Message    May a detailed message be left on voicemail: yes     Reason for Call: Medication Question or concern regarding medication   Prescription Clarification  Name of Medication: trospium (SANCTURA XR) 60 MG CP24 24 hr capsule  Prescribing Provider: Steve   Pharmacy:    [facility] [address] [place], [place] [id number] [address] [place] [address]   What on the order needs clarification?   The patient called stating this medication did not help her bladder issues. She says she is due for a refill but doesn't think she should be taking it anymore? Please review and contact patient. Thank you.    Action Taken: Message routed to:  Other: Urology    Travel Screening: Not Applicable

## 2023-03-23 NOTE — TELEPHONE ENCOUNTER
Patient calling.  Reports that she increased the Zoloft to 100mg on 3/10/23.  C/o side effects - migraines and states her dizziness is worse (had a fall 3/20/23 - thinks she was dizzy at the time).    Next step?  Different medication?    Please advise, thanks.

## 2023-03-24 ENCOUNTER — TELEPHONE (OUTPATIENT)
Dept: INTERNAL MEDICINE | Facility: CLINIC | Age: 83
End: 2023-03-24

## 2023-03-24 DIAGNOSIS — R51.9 ACUTE INTRACTABLE HEADACHE, UNSPECIFIED HEADACHE TYPE: ICD-10-CM

## 2023-03-24 RX ORDER — BUTALBITAL, ASPIRIN, AND CAFFEINE 325; 50; 40 MG/1; MG/1; MG/1
CAPSULE ORAL
Qty: 30 CAPSULE | Refills: 0 | Status: CANCELLED | OUTPATIENT
Start: 2023-03-24

## 2023-03-24 NOTE — TELEPHONE ENCOUNTER
butalbital-aspirin-caffeine (FIORINAL) -40 MG capsule      Last Written Prescription Date:  05/27/22  Last Fill Quantity: 30,   # refills: 0  Last Office Visit: 03/08/23  Future Office visit:    Next 5 appointments (look out 90 days)    Mar 30, 2023 10:00 AM  (Arrive by 9:40 AM)  Provider Visit with Patti Suárez MD  [facility] ([facility] ) [address]  [address]  [facility] [address]  [phone]           Routing refill request to provider for review/approval because:  Drug not active on patient's medication list

## 2023-03-24 NOTE — TELEPHONE ENCOUNTER
Call placed to patient.     Wants to know if provider wants to go to 75 instead of back to 50 mg? Patient advised of 50 mg and patient agrees to step back to 50 mg.    Patient fell 03/20 - fell on bottom, tailbone and hips are sore.    Patient is complaining of headache. States that headache did not develop after fall. Patient has had migraines in the recent past that has left her with a residual headache. Patient is requesting refill of Fiorinal as Daughter is going to be in town today and will be able to pick it up.    Request pended.    Fiorinal  Routing refill request to provider for review/approval because:  Drug not on the [facility] refill protocol

## 2023-03-24 NOTE — TELEPHONE ENCOUNTER
[facility] calling to report patient had a fall on 3/20/23 in the evening. Patient denies hitting her head. She has been having dizziness and that is why she thinks she fell. Patient says she is sore and her buttocks hurts.    Evelin [phone]

## 2023-03-25 RX ORDER — BUTALBITAL, ASPIRIN, AND CAFFEINE 325; 50; 40 MG/1; MG/1; MG/1
CAPSULE ORAL
Qty: 30 CAPSULE | Refills: 0 | OUTPATIENT
Start: 2023-03-25

## 2023-03-25 RX ORDER — BUTALBITAL, ASPIRIN, AND CAFFEINE 325; 50; 40 MG/1; MG/1; MG/1
CAPSULE ORAL
Qty: 15 CAPSULE | Refills: 0 | Status: SHIPPED | OUTPATIENT
Start: 2023-03-25 | End: 2023-03-31

## 2023-03-27 ENCOUNTER — TELEPHONE (OUTPATIENT)
Dept: INTERNAL MEDICINE | Facility: CLINIC | Age: 83
End: 2023-03-27
Payer: COMMERCIAL

## 2023-03-27 DIAGNOSIS — G43.801 OTHER MIGRAINE WITH STATUS MIGRAINOSUS, NOT INTRACTABLE: Primary | ICD-10-CM

## 2023-03-27 NOTE — TELEPHONE ENCOUNTER
It appears Dr. Lauren ordered fiorinal on 3/24/2023. She can try to take 75mg but if negative side effects occur she should stay at 50 mg. They do not make 75mg tablets so she would have to take 1.5 50mg tablets for total dose of 75 mg    Sushant Escobar NP

## 2023-03-27 NOTE — TELEPHONE ENCOUNTER
Patient states the medication is for her migraine HAs.    Future Appointments 3/27/2023 - 9/23/2023      Date Visit Type Length Department Provider     3/30/2023 10:00 AM OFFICE VISIT 30 min Patti Camargo MD    Location Instructions:     [facility] is in the [facility] at [address] next to [facility]. This is near the [address] and the [address]  exits off of [address]. To reach the clinic from [place] [address], turn [address] onto [address], then turn east on [address]. Clinic parking is available next to the [facility], which is just east of the hospital s main entrance.               4/11/2023 11:30 AM RETURN PATIENT 15 min [facility] Deisy Wen MD    Location Instructions:     [facility] [address], [place] [address]

## 2023-03-27 NOTE — TELEPHONE ENCOUNTER
Central Prior Authorization Team   Phone: [phone]      PRIOR AUTHORIZATION DENIED    Medication: butalbital-aspirin-caffeine (FIORINAL) -40 MG capsule - EPA DENIED    Denial Date: 3/27/2023    Denial Rational:  The patient's diagnosis does not meet criteria for approval. This is not considered an FDA approved condition: Acute intractable headache, unspecified headache type [R51.9]         Appeal Information: appeals will get sent to the address below:

## 2023-03-27 NOTE — TELEPHONE ENCOUNTER
Patient informed of provider's message below.    Received PA for fiorinal - see telephone encounter 3/27/23.  Patient states medication is for migraine HAs.    Reports she has an appointment 3/30/23 and will further discuss with provider.    And virtual appointment scheduled with Sushant regarding Zoloft per her request.

## 2023-03-27 NOTE — TELEPHONE ENCOUNTER
HOME HEALTH CERTIFICATION 3/17/23 - 5/15/23; received via fax. Orders/Forms/Letters in your mailbox to be signed.

## 2023-03-28 DIAGNOSIS — Z53.9 DIAGNOSIS NOT YET DEFINED: Primary | ICD-10-CM

## 2023-03-28 PROCEDURE — G0179 MD RECERTIFICATION HHA PT: HCPCS | Performed by: INTERNAL MEDICINE

## 2023-03-29 ENCOUNTER — VIRTUAL VISIT (OUTPATIENT)
Dept: INTERNAL MEDICINE | Facility: CLINIC | Age: 83
End: 2023-03-29
Payer: COMMERCIAL

## 2023-03-29 DIAGNOSIS — F32.A ANXIETY AND DEPRESSION: ICD-10-CM

## 2023-03-29 DIAGNOSIS — F41.9 ANXIETY AND DEPRESSION: ICD-10-CM

## 2023-03-29 PROCEDURE — 99214 OFFICE O/P EST MOD 30 MIN: CPT | Mod: VID

## 2023-03-29 NOTE — TELEPHONE ENCOUNTER
Patient calls back, informed her of message from Dr. Lauren to check with her insurance on covered options for Fiorinal. Patient states that she has taken Fiorinal for migraines for many years and she was told by [facility] they would cover with a diagnosis of migraines. Patient asks to have diagnosis updated.     Mahsa Arteaga RN  [facility]

## 2023-03-29 NOTE — PROGRESS NOTES
"Savanna is a 82 year old who is being evaluated via a billable video visit.      How would you like to obtain your AVS? Patient declined  If the video visit is dropped, the invitation should be resent by: Text to cell phone: [phone]  Will anyone else be joining your video visit? No  Really curious to see what Maria Fernanda has to say    Assessment & Plan     Anxiety and depression  Patient currently taking 50 mg of Zoloft.  She is feeling all right on medication but believes her mood could be better she did have some migraine with 100 mg but she is also taking trospium at that time.  She she believes that migraine was a reaction to trospium.  I recommend she take 75 mg of Zoloft for 2 weeks and then increase to 100 mg.    BMI:   Estimated body mass index is 37.71 kg/m  as calculated from the following:    Height as of 3/15/23: 1.727 m (5' 8\").    Weight as of 3/15/23: 112.5 kg (248 lb).       Sushant Escobar NP  [facility]    Subjective   Savanna is a 82 year old, presenting for the following health issues:    Recheck Medication (Want to discuss about Zoloft.)    HPI     Patient was taking Zoloft 50 mg   She took 100 mg and that it was helping her. She is cuurrently taking 75mg.    She was prescribed trospium by Dr. Wilson and she has stopped since she thinks that it was causing migraines while mixing this with     Review of Systems   Constitutional, HEENT, cardiovascular, pulmonary, GI, , musculoskeletal, neuro, skin, endocrine and psych systems are negative, except as otherwise noted.      Objective         Vitals:  No vitals were obtained today due to virtual visit.    Physical Exam   GENERAL: alert and no distress  EYES: Eyes grossly normal to inspection.  No discharge or erythema, or obvious scleral/conjunctival abnormalities.  RESP: No audible wheeze, cough, or visible cyanosis.  No visible retractions or increased work of breathing.    SKIN: Visible skin clear. No significant rash, abnormal " pigmentation or lesions.  NEURO: Cranial nerves grossly intact.  Mentation and speech appropriate for age.  PSYCH: Mentation appears normal, affect normal/bright, judgement and insight intact, normal speech and appearance well-groomed.      Video-Visit Details    Type of service:  Video Visit   Video Start Time: 11:42 AM  Video End Time:11:58 AM    Originating Location (pt. Location): Home  Distant Location (provider location):  On-site  Platform used for Video Visit: Gila

## 2023-03-29 NOTE — PATIENT INSTRUCTIONS
Start taking 75 mg of zoloft (1.5 tablets) for 2 weeks and if you are feeling okay on this dose then increase to 100 mg.

## 2023-03-30 ENCOUNTER — DOCUMENTATION ONLY (OUTPATIENT)
Dept: ANTICOAGULATION | Facility: CLINIC | Age: 83
End: 2023-03-30

## 2023-03-30 ENCOUNTER — OFFICE VISIT (OUTPATIENT)
Dept: INTERNAL MEDICINE | Facility: CLINIC | Age: 83
End: 2023-03-30
Payer: COMMERCIAL

## 2023-03-30 ENCOUNTER — TELEPHONE (OUTPATIENT)
Dept: INTERNAL MEDICINE | Facility: CLINIC | Age: 83
End: 2023-03-30

## 2023-03-30 VITALS
DIASTOLIC BLOOD PRESSURE: 74 MMHG | HEIGHT: 68 IN | SYSTOLIC BLOOD PRESSURE: 110 MMHG | TEMPERATURE: 98 F | RESPIRATION RATE: 18 BRPM | WEIGHT: 241.5 LBS | BODY MASS INDEX: 36.6 KG/M2 | OXYGEN SATURATION: 96 % | HEART RATE: 60 BPM

## 2023-03-30 DIAGNOSIS — R29.6 FALLS FREQUENTLY: ICD-10-CM

## 2023-03-30 DIAGNOSIS — I48.20 CHRONIC ATRIAL FIBRILLATION (H): ICD-10-CM

## 2023-03-30 DIAGNOSIS — G89.29 CHRONIC MIDLINE LOW BACK PAIN WITHOUT SCIATICA: ICD-10-CM

## 2023-03-30 DIAGNOSIS — Z79.01 LONG TERM (CURRENT) USE OF ANTICOAGULANTS: ICD-10-CM

## 2023-03-30 DIAGNOSIS — R29.898 LEG WEAKNESS, BILATERAL: ICD-10-CM

## 2023-03-30 DIAGNOSIS — I48.21 PERMANENT ATRIAL FIBRILLATION (H): Primary | ICD-10-CM

## 2023-03-30 DIAGNOSIS — R26.81 UNSTABLE GAIT: ICD-10-CM

## 2023-03-30 DIAGNOSIS — M54.50 CHRONIC MIDLINE LOW BACK PAIN WITHOUT SCIATICA: ICD-10-CM

## 2023-03-30 DIAGNOSIS — G62.9 PERIPHERAL POLYNEUROPATHY: ICD-10-CM

## 2023-03-30 DIAGNOSIS — R63.4 UNINTENDED WEIGHT LOSS: Primary | ICD-10-CM

## 2023-03-30 PROCEDURE — 99214 OFFICE O/P EST MOD 30 MIN: CPT | Performed by: INTERNAL MEDICINE

## 2023-03-30 ASSESSMENT — PAIN SCALES - GENERAL: PAINLEVEL: NO PAIN (0)

## 2023-03-30 NOTE — PROGRESS NOTES
ANTICOAGULATION     Savanna Rehman is overdue for INR check.      Spoke with Raj, home care, patient cancelled appointment on 3/28/23 reporting she would have INR done at office visit, appears patient had a virtual visit on 3/29 so no INR done. Patient has office visit today in clinic, appears MD has placed INR order. Advised home care if no INR was drawn today then please draw INR at next scheduled visit on 4/4/23.  INR was 1.7 at last visit so okay per protocol to go out 2 weeks from last check.    Iris Kemp RN

## 2023-03-30 NOTE — PROGRESS NOTES
Dr Lauren's note      Patient's instructions / PLAN:                                                        Plan:  1. Chest   XRay today - [address]   2.  Referral to neurology    [facility] [place] [phone]     [email or web address]        ASSESSMENT & PLAN:                                                      82-year-old woman with diabetes, peripheral neuropathy, CKD, obesity, recurrent C. difficile in 2022.  Unfortunately she does not come for regular appointments  Now she complains of bilateral leg weakness and unintentional weight loss.  I think we have an explanation for the weight loss.  For the bilateral leg weakness, I tried to order an lumbar spine MRI, but according to the epic, it is not covered by insurance, so I have not ordered it.  I made a referral to see neurology    (R63.4) Unintended weight loss  (primary encounter diagnosis)  Comment:   Plan: XR Chest 2 Views            (G62.9) Peripheral polyneuropathy  Comment:   Plan:     (R29.6) Falls frequently  Comment:   Plan: Adult Neurology  Referral            (R29.898) Leg weakness, bilateral  Comment:   Plan: Adult Neurology  Referral            (M54.50,  G89.29) Chronic midline low back pain without sciatica  Comment:   Plan:     (R26.81) Unstable gait  Comment:   Plan: Adult Neurology  Referral            (I48.20) Chronic atrial fibrillation (H)  Comment: Rate controlled  Plan: INR        Continue same meds, same doses for now        Chief complaint:                                                      Weight loss   Neuropathy     SUBJECTIVE:                                                    History of present illness:    Wt loss  -- unintentional for 17 lb in 12 m   -- appetite is poor   -- abd CT 2022 -- diverticulosis   -- She thought that she lost 17 pounds since the last office visit.  I show her the flow chart  -- I explained her that usually for  unintentional weight loss will recommend abdominal CT, chest x-ray.  -- She had struggle with a CDiff for the last year, and 17 pounds in 1 year considering the medical history, I do not considerate it worrisome.  She agrees      Falls and neuropathy  -- she feels the legs very weak  -- a friend gave her a scoter  -- uses walker inside a house   -- she can't get up from the floor. The ambulance has to be called   --     Mamadou Corrales is a 82 year old, presenting for the following health issues:  Patient is being seen for an follow up for neuropathy.  No flowsheet data found.  HPI     Diabetes Follow-up      How often are you checking your blood sugar? Not at all    What concerns do you have today about your diabetes? None     Do you have any of these symptoms? (Select all that apply)  Numbness in feet, Redness, sores, or blisters on feet, Blurry vision and Weight loss    Have you had a diabetic eye exam in the last 12 months? No        BP Readings from Last 2 Encounters:   03/15/23 129/72   03/08/23 (!) 146/77     Hemoglobin A1C (%)   Date Value   02/06/2023 7.3 (H)   11/07/2022 6.3 (H)   02/22/2021 6.3 (H)   09/14/2020 6.4 (H)     LDL Cholesterol Calculated (mg/dL)   Date Value   11/07/2022 76   06/02/2022 100   02/22/2021 107 (H)   03/04/2020 111 (H)                 How many servings of fruits and vegetables do you eat daily?  0-1    On average, how many sweetened beverages do you drink each day (Examples: soda, juice, sweet tea, etc.  Do NOT count diet or artificially sweetened beverages)?   0    How many days per week do you exercise enough to make your heart beat faster? 3 or less    How many minutes a day do you exercise enough to make your heart beat faster? 9 or less    How many days per week do you miss taking your medication? 0      Review of Systems:                                                      ROS: negative for fever, chills, cough, wheezes, chest pain, shortness of breath, vomiting,  "abdominal pain, leg swelling       OBJECTIVE:             Physical exam:  Blood pressure 110/74, pulse 60, temperature 98  F (36.7  C), temperature source Tympanic, resp. rate 18, height 1.727 m (5' 8\"), weight 109.5 kg (241 lb 8 oz), SpO2 96 %, not currently breastfeeding.     NAD, appears comfortable  Skin: no rashes   Neck: supple, no JVD,  No thyroidmegaly. Lymph nodes nonpalpable cervical and supraclavicular.  Chest: clear to auscultation bilaterally, good respiratory effort  Heart: S1 S2, RRR, no mgr appreciated  Abdomen: soft, not tender,   Neurologic: A, Ox3, no focal signs appreciated  Neurologic exam is limited, she sits in the scooter.  The lower extremity motor strength is decreased but no focal deficit.  Feet movements are strong with no deficit.  She has good bilateral pedal pulses    PMHx: reviewed  Past Medical History:   Diagnosis Date     Anemia     Iron Deficiency anemia     Atrial fibrillation (H)      CAD (coronary artery disease)     non-obstructive     Chronic pain     neck, low back, legs     Congestive heart failure (H)      Degenerative disk disease      Diabetes (H)      Fibromyalgia      Gastro-oesophageal reflux disease      Gout      Hiatal hernia      Mumps      Neuropathy      CRISTIANA (obstructive sleep apnea) - CPAP      Palpitations      Pernicious anemia      Sleep apnea     uses CPAP.     Urinary incontinence      Vitamin D deficiency       PSHx: reviewed  Past Surgical History:   Procedure Laterality Date     APPENDECTOMY       CHOLECYSTECTOMY       COLONOSCOPY  3/15/2011     COLONOSCOPY N/A 3/15/2023    Procedure: COLONOSCOPY, WITH POLYPECTOMY AND BIOPSY;  Surgeon: Maci Coronel MD;  Location: [facility]     COLONOSCOPY N/A 3/15/2023    Procedure: COLONOSCOPY, FLEXIBLE, WITH LESION REMOVAL USING SNARE;  Surgeon: Maci Coronel MD;  Location: [facility]     CORONARY ANGIOGRAPHY ADULT ORDER       CYSTOSCOPY, BIOPSY BLADDER, COMBINED N/A 7/13/2020    Procedure: CYSTOSCOPY, WITH " BLADDER BIOPSY;  Surgeon: Deisy Wilson MD;  Location: [facility]     HEART CATH LEFT HEART CATH  12/30/16    medication management     HYSTERECTOMY TOTAL ABDOMINAL       Knee replacement NOS Left      LAPAROSCOPIC NISSEN FUNDOPLICATION N/A 2/4/2015    Procedure: LAPAROSCOPIC NISSEN FUNDOPLICATION;  Surgeon: Armando Ansari MD;  Location: [facility] OR     TONSILLECTOMY       TRANSPOSITION ULNAR NERVE (ELBOW)          Meds: reviewed  Current Outpatient Medications   Medication Sig Dispense Refill     ACE/ARB/ARNI NOT PRESCRIBED (INTENTIONAL) Please choose reason not prescribed, below       alcohol swab prep pads Use to swab area of injection/chandrakant as directed. 100 each 3     bisacodyl (DULCOLAX) 5 MG EC tablet Take 2 tablets at 3 pm the day before your procedure. If your procedure is before 11 am, take 2 additional tablets at 11 pm. If your procedure is after 11 am, take 2 additional tablets at 6 am. For additional instructions refer to your colonoscopy prep instructions. 4 tablet 0     blood glucose (NO BRAND SPECIFIED) lancets standard Use to test blood sugar  as directed. 1 each 0     blood glucose (NO BRAND SPECIFIED) lancing device Device to be used with lancets. Patient requests [employer] 2 lancing device to check blood glucose once per day. 1 each 0     blood glucose (ONETOUCH ULTRA) test strip Use to test blood sugar 1 times daily 100 strip 1     blood glucose calibration (NO BRAND SPECIFIED) solution Use to calibrate blood glucose monitor 1 Bottle 3     blood glucose monitoring (NO BRAND SPECIFIED) meter device kit Use to test blood sugar 1 times daily or as directed. 1 kit 1     blood glucose monitoring (NO BRAND SPECIFIED) meter device kit Use to test blood sugar 1 time daily 1 kit 0     butalbital-aspirin-caffeine (FIORINAL) -40 MG capsule TAKE 1 CAPSULE BY MOUTH EVERY 6 HOURS AS NEEDED FOR HEADACHE 15 capsule 0     EPINEPHrine (ANY BX GENERIC EQUIV) 0.3 MG/0.3ML injection 2-pack INJECT 0.3MLS  (0.3MG) INTO THE MUSCLE ONCE AS NEEDED FOR ANAPHYLAXIS 2 each 1     glipiZIDE (GLUCOTROL XL) 2.5 MG 24 hr tablet Take 1 tablet (2.5 mg) by mouth 2 times daily 180 tablet 3     meclizine (ANTIVERT) 12.5 MG tablet TAKE 1 TABLET BY MOUTH THREE TIMES DAILY AS NEEDED FOR DIZZINESS 30 tablet 1     polyethylene glycol (GOLYTELY) 236 g suspension The night before the exam at 6 pm drink an 8-ounce glass every 15 minutes until the jug is half empty. If you arrive before 11 AM: Drink the other half of the Golytely jug at 11 PM night before procedure. If you arrive after 11 AM: Drink the other half of the Golytely jug at 6 AM day of procedure. For additional instructions refer to your colonoscopy prep instructions. 4000 mL 0     sertraline (ZOLOFT) 50 MG tablet Take 1 tablet (50 mg) by mouth daily 90 tablet 1     trospium (SANCTURA XR) 60 MG CP24 24 hr capsule Take 1 capsule (60 mg) by mouth every morning 30 capsule 3     warfarin ANTICOAGULANT (COUMADIN) 2.5 MG tablet TAKE 2.5 mg (2.5 mg x 1) every Tue, Thu, Sat; 3.75 mg (2.5 mg x 1.5) all other days or as directed. 135 tablet 1       Soc Hx: reviewed  Fam Hx: reviewed      Chart documentation was completed, in part, with [employer] voice-recognition software. Even though reviewed, some grammatical, spelling, and word errors may remain.      Patti Lauren MD  Internal Medicine

## 2023-03-30 NOTE — PATIENT INSTRUCTIONS
Plan:  1. Chest   XRay today - [address]   2.  Referral to neurology    [facility] [phone]     [email or web address]

## 2023-03-31 ENCOUNTER — MEDICAL CORRESPONDENCE (OUTPATIENT)
Dept: HEALTH INFORMATION MANAGEMENT | Facility: CLINIC | Age: 83
End: 2023-03-31

## 2023-03-31 RX ORDER — BUTALBITAL, ASPIRIN, AND CAFFEINE 325; 50; 40 MG/1; MG/1; MG/1
CAPSULE ORAL
Qty: 15 CAPSULE | Refills: 0 | Status: SHIPPED | OUTPATIENT
Start: 2023-03-31 | End: 2023-06-22

## 2023-04-04 ENCOUNTER — ANTICOAGULATION THERAPY VISIT (OUTPATIENT)
Dept: ANTICOAGULATION | Facility: CLINIC | Age: 83
End: 2023-04-04
Payer: COMMERCIAL

## 2023-04-04 DIAGNOSIS — I48.20 CHRONIC ATRIAL FIBRILLATION (H): ICD-10-CM

## 2023-04-04 DIAGNOSIS — I48.21 PERMANENT ATRIAL FIBRILLATION (H): Primary | ICD-10-CM

## 2023-04-04 DIAGNOSIS — Z79.01 LONG TERM (CURRENT) USE OF ANTICOAGULANTS: ICD-10-CM

## 2023-04-04 LAB — INR (EXTERNAL): 1.9 (ref 0.9–1.1)

## 2023-04-04 NOTE — PROGRESS NOTES
ANTICOAGULATION MANAGEMENT     Savanna Rehman 82 year old female is on warfarin with subtherapeutic INR result. (Goal INR 2.0-3.0)    Recent labs: (last 7 days)     04/04/23  1217   INR 1.9*       ASSESSMENT       Source(s): Chart Review and Home Care/Facility Nurse       Warfarin doses taken: Warfarin taken as instructed    Diet: No new diet changes identified    New illness, injury, or hospitalization: Has been having diarrhea of late-sounds like it is somewhat chronic/frequent    Medication/supplement changes: None noted    Signs or symptoms of bleeding or clotting: No    Previous INR: Subtherapeutic    Additional findings: None         PLAN     Recommended plan for no diet, medication or health factor changes affecting INR     Dosing Instructions: Increase your warfarin dose (6% change) with next INR in 9 days       Summary  As of 4/4/2023    Full warfarin instructions:  2.5 mg every Mon, Wed, Fri; 3.75 mg all other days   Next INR check:  4/13/2023             Telephone call with [facility] home care nurse who verbalizes understanding and agrees to plan    Orders given to  Homecare nurse/facility to recheck    Education provided:     Goal range and lab monitoring: goal range and significance of current result    Plan made per [facility] anticoagulation protocol    Miko West RN  Anticoagulation Clinic  4/4/2023    _______________________________________________________________________     Anticoagulation Episode Summary     Current INR goal:  2.0-3.0   TTR:  60.7 % (1 y)   Target end date:  Indefinite   Send INR reminders to:  EDISON KASKRISHNA    Indications    Permanent atrial fibrillation (H) [I48.21]  Chronic atrial fibrillation (H) [I48.20]  Long term (current) use of anticoagulants [Z79.01]           Comments:  Home care          Anticoagulation Care Providers     Provider Role Specialty Phone number    Patti Suárez MD Referring Internal Medicine [phone]

## 2023-04-05 ENCOUNTER — TELEPHONE (OUTPATIENT)
Dept: INTERNAL MEDICINE | Facility: CLINIC | Age: 83
End: 2023-04-05
Payer: COMMERCIAL

## 2023-04-05 NOTE — TELEPHONE ENCOUNTER
Patient called got the phone call to book with the referral and she doesn't feel comfortablewaiting until Sept.    Are you aware of any doctors closer to her like [place] or near there please.    Please return call with information.

## 2023-04-06 NOTE — TELEPHONE ENCOUNTER
[facility], in [place] or [place] are the only place where she can get a sooner appointment that neurology at [place]    I am aware that every department has issue with access

## 2023-04-06 NOTE — TELEPHONE ENCOUNTER
Patient returns calls. Would like referral for [facility] in [place] for her neuropathy.    Ok to leave a detailed message when calling patient back with referral information.

## 2023-04-06 NOTE — TELEPHONE ENCOUNTER
Called patient, she states she is unable to speak on phone at this time as she has a  visiting her right now.  BENITA Graham R.N.

## 2023-04-07 NOTE — TELEPHONE ENCOUNTER
ALL THE REFERRAL IN [facility] ARE THROUGH  !!!  [facility] does not allow specific referrals.  RN may talk to the  if she wants.  The patient can call [facility],  herself and check with insurance for coverage

## 2023-04-11 ENCOUNTER — OFFICE VISIT (OUTPATIENT)
Dept: UROLOGY | Facility: CLINIC | Age: 83
End: 2023-04-11
Payer: COMMERCIAL

## 2023-04-11 VITALS — BODY MASS INDEX: 36.53 KG/M2 | HEIGHT: 68 IN | WEIGHT: 241 LBS | HEART RATE: 64 BPM

## 2023-04-11 DIAGNOSIS — N39.41 URGENCY INCONTINENCE: ICD-10-CM

## 2023-04-11 DIAGNOSIS — N32.81 URGENCY-FREQUENCY SYNDROME: ICD-10-CM

## 2023-04-11 DIAGNOSIS — E66.01 MORBID OBESITY (H): ICD-10-CM

## 2023-04-11 DIAGNOSIS — R35.0 URINARY FREQUENCY: Primary | ICD-10-CM

## 2023-04-11 DIAGNOSIS — R35.0 URINARY FREQUENCY: ICD-10-CM

## 2023-04-11 DIAGNOSIS — G47.33 OSA (OBSTRUCTIVE SLEEP APNEA): ICD-10-CM

## 2023-04-11 DIAGNOSIS — N39.0 RECURRENT UTI: ICD-10-CM

## 2023-04-11 DIAGNOSIS — I48.20 CHRONIC ATRIAL FIBRILLATION (H): ICD-10-CM

## 2023-04-11 DIAGNOSIS — E11.65 TYPE 2 DIABETES MELLITUS WITH HYPERGLYCEMIA, WITHOUT LONG-TERM CURRENT USE OF INSULIN (H): ICD-10-CM

## 2023-04-11 LAB — RESIDUAL VOLUME (RV) (EXTERNAL): 32

## 2023-04-11 PROCEDURE — 51798 US URINE CAPACITY MEASURE: CPT | Performed by: UROLOGY

## 2023-04-11 PROCEDURE — 99214 OFFICE O/P EST MOD 30 MIN: CPT | Mod: 25 | Performed by: UROLOGY

## 2023-04-11 RX ORDER — MIRABEGRON 25 MG/1
25 TABLET, FILM COATED, EXTENDED RELEASE ORAL DAILY
Qty: 30 TABLET | Refills: 3 | Status: SHIPPED | OUTPATIENT
Start: 2023-04-11 | End: 2023-05-12

## 2023-04-11 ASSESSMENT — PAIN SCALES - GENERAL: PAINLEVEL: MILD PAIN (2)

## 2023-04-11 NOTE — NURSING NOTE
Has had two major falls  Since Dec . No UTI's . Had to stop  Trospium   Has she was getting migraine . Voided just prior to visit . PVR  Was  32 ML  On bladder scan . Feels she may  Have C-diff again

## 2023-04-11 NOTE — LETTER
4/11/2023       RE: Savanna Rehman  [id number]  [place] [address]     Dear Colleague,    Thank you for referring your patient, Savanna Rehman, to the [facility] at [facility]. Please see a copy of my visit note below.    April 11, 2023    Diagnoses and all orders for this visit:    Urinary frequency  -     UA without Microscopic [[id number]]; Future  -     MEASURE POST-VOID RESIDUAL URINE/BLADDER CAPACITY, US NON-IMAGING (26467)  -     mirabegron (MYRBETRIQ) 25 MG 24 hr tablet; Take 1 tablet (25 mg) by mouth daily    Recurrent UTI  -     UA without Microscopic [[id number]]; Future  -     MEASURE POST-VOID RESIDUAL URINE/BLADDER CAPACITY, US NON-IMAGING (21677)    Morbid obesity (H)    Type 2 diabetes mellitus with hyperglycemia, without long-term current use of insulin (H)    Urgency-frequency syndrome  -     UA without Microscopic [[id number]]; Future  -     MEASURE POST-VOID RESIDUAL URINE/BLADDER CAPACITY, US NON-IMAGING (21678)  -     mirabegron (MYRBETRIQ) 25 MG 24 hr tablet; Take 1 tablet (25 mg) by mouth daily    Urgency incontinence  -     UA without Microscopic [[id number]]; Future  -     MEASURE POST-VOID RESIDUAL URINE/BLADDER CAPACITY, US NON-IMAGING (38561)  -     mirabegron (MYRBETRIQ) 25 MG 24 hr tablet; Take 1 tablet (25 mg) by mouth daily    CRISTIANA (obstructive sleep apnea)    Chronic atrial fibrillation (H)       Trial of mirabegron or see if she is a candidate for vibegron study    Recommend she deal with her other issues for now and plan to return to see us in     RTC 6 months, sooner if needed    20 minutes were spent today on the day of the encounter in reviewing the EMR, direct patient care including prescription medications, coordination of care and documentation    Deisy Wilson MD MPH  (she/her/hers)   of Urology  [facility]      Subjective    Here today with her  "daughter.  Patient continues to have falls. She is seeing neurology for neuropathy in her feet.  She is worried she has C diff again.  She hasn't seen ID.  Her urgency incontinence continues to be bothersome.  The tropsium isn't working and she stopped because she was worried it makes her migraines worse.  She also notes that she continues to have bad nocturia, has CRISTIANA and does not wear anything for this.  She denies any changes in health since last visit    Pulse 64   Ht 1.727 m (5' 8\")   Wt 109.3 kg (241 lb)   LMP  (LMP Unknown)   BMI 36.64 kg/m    GENERAL: healthy, alert and no distress  EYES: Eyes grossly normal to inspection, conjunctivae and sclerae normal  HENT: normal cephalic/atraumatic.  External ears, nose and mouth without ulcers or lesions.  RESP: no audible wheeze, cough, or visible cyanosis.  No visible retractions or increased work of breathing.  Able to speak fully in complete sentences.  NEURO: Cranial nerves grossly intact, mentation intact and speech normal  PSYCH: mentation appears normal, affect normal/bright, judgement and insight intact, normal speech and appearance well-groomed    CC  Patient Care Team:  Patti Suárez MD as PCP - General (Internal Medicine)  Patti Suárez MD as Assigned PCP  Patti Suárez MD as Referring Physician (Internal Medicine)  Deisy Wilson MD as MD (Urology)  Adelaida Liu, RN as Specialty Care Coordinator (Urology)  Deisy Wilson MD as Assigned Surgical Provider  Anjali Rabago MD as Assigned Pulmonology Provider  Jeannette Modi MD as Assigned Sleep Provider  Allison Mack MD as MD (Internal Medicine)    "

## 2023-04-11 NOTE — TELEPHONE ENCOUNTER
Pharmacy comment: This is very expensive for the customer. Oxybutinun was tried in the past but ineffective.  Are there any other alternatives? Thank you!

## 2023-04-11 NOTE — PATIENT INSTRUCTIONS
1) Work with Dr Granados on the UTIs and C diff    2) Try the mirabegron or the vibegron if you qualify for the study    3) Wear your CPAP mask to help the night time urination    4) Neurology for the neuropathy in your feet    5) Do your x ray    Websites with free information:    American Urogynecologic Society patient website: www.voicesforpfd.org    Total Control Program: www.totalcontrolprogram.com    Supplements to prevent UTI to consider  -Probiotics  -Cranberry (for these products let them know a doctor is recommending them)   Ellura: www.myellura.com   Theracran HP by [employer] [facility] [address]  -d-mannose 2gm daily  -Vitamin C 500-1000mg twice a day    [facility]  [address] [place] [address]  [phone]    It was a pleasure meeting with you today.  Thank you for allowing me and my team the privilege of caring for you today.  YOU are the reason we are here, and I truly hope we provided you with the excellent service you deserve.  Please let us know if there is anything else we can do for you so that we can be sure you are leaving completely satisfied with your care experience.

## 2023-04-11 NOTE — PROGRESS NOTES
April 11, 2023    Diagnoses and all orders for this visit:    Urinary frequency  -     UA without Microscopic [[id number]]; Future  -     MEASURE POST-VOID RESIDUAL URINE/BLADDER CAPACITY, US NON-IMAGING (50259)  -     mirabegron (MYRBETRIQ) 25 MG 24 hr tablet; Take 1 tablet (25 mg) by mouth daily    Recurrent UTI  -     UA without Microscopic [[id number]]; Future  -     MEASURE POST-VOID RESIDUAL URINE/BLADDER CAPACITY, US NON-IMAGING (05923)    Morbid obesity (H)    Type 2 diabetes mellitus with hyperglycemia, without long-term current use of insulin (H)    Urgency-frequency syndrome  -     UA without Microscopic [[id number]]; Future  -     MEASURE POST-VOID RESIDUAL URINE/BLADDER CAPACITY, US NON-IMAGING (61619)  -     mirabegron (MYRBETRIQ) 25 MG 24 hr tablet; Take 1 tablet (25 mg) by mouth daily    Urgency incontinence  -     UA without Microscopic [[id number]]; Future  -     MEASURE POST-VOID RESIDUAL URINE/BLADDER CAPACITY, US NON-IMAGING (61229)  -     mirabegron (MYRBETRIQ) 25 MG 24 hr tablet; Take 1 tablet (25 mg) by mouth daily    CRISTIANA (obstructive sleep apnea)    Chronic atrial fibrillation (H)       Trial of mirabegron or see if she is a candidate for vibegron study    Recommend she deal with her other issues for now and plan to return to see us in     RTC 6 months, sooner if needed    20 minutes were spent today on the day of the encounter in reviewing the EMR, direct patient care including prescription medications, coordination of care and documentation    Deisy Wilson MD MPH  (she/her/hers)   of Urology  [facility]      Subjective    Here today with her daughter.  Patient continues to have falls. She is seeing neurology for neuropathy in her feet.  She is worried she has C diff again.  She hasn't seen ID.  Her urgency incontinence continues to be bothersome.  The tropsium isn't working and she stopped because she was worried it makes her migraines worse.  She also notes that she  "continues to have bad nocturia, has CRISTIANA and does not wear anything for this.  She denies any changes in health since last visit    Pulse 64   Ht 1.727 m (5' 8\")   Wt 109.3 kg (241 lb)   LMP  (LMP Unknown)   BMI 36.64 kg/m    GENERAL: healthy, alert and no distress  EYES: Eyes grossly normal to inspection, conjunctivae and sclerae normal  HENT: normal cephalic/atraumatic.  External ears, nose and mouth without ulcers or lesions.  RESP: no audible wheeze, cough, or visible cyanosis.  No visible retractions or increased work of breathing.  Able to speak fully in complete sentences.  NEURO: Cranial nerves grossly intact, mentation intact and speech normal  PSYCH: mentation appears normal, affect normal/bright, judgement and insight intact, normal speech and appearance well-groomed    CC  Patient Care Team:  Patti Suárez MD as PCP - General (Internal Medicine)  Patti Suárez MD as Assigned PCP  Patti Suárez MD as Referring Physician (Internal Medicine)  Deisy Wilson MD as MD (Urology)  Adelaida Liu, RN as Specialty Care Coordinator (Urology)  Deisy Wilson MD as Assigned Surgical Provider  Anjali Rabago MD as Assigned Pulmonology Provider  Jeannette Modi MD as Assigned Sleep Provider  Allison Mack MD as MD (Internal Medicine)        "

## 2023-04-12 ENCOUNTER — TELEPHONE (OUTPATIENT)
Dept: UROLOGY | Facility: CLINIC | Age: 83
End: 2023-04-12
Payer: COMMERCIAL

## 2023-04-12 DIAGNOSIS — R19.7 DIARRHEA: Primary | ICD-10-CM

## 2023-04-12 DIAGNOSIS — R19.7 DIARRHEA: ICD-10-CM

## 2023-04-12 RX ORDER — MIRABEGRON 25 MG/1
TABLET, FILM COATED, EXTENDED RELEASE ORAL
Qty: 30 TABLET | Refills: 3 | OUTPATIENT
Start: 2023-04-12

## 2023-04-12 NOTE — TELEPHONE ENCOUNTER
Left patient message to call back to discuss below      Deisy Wilson See MD Ivanna Morales Nina, RN  She has not tolerated anticholinergics so no.  Barber should be reaching out to the patient to see if she is a candidate for the Gemtesa study           Previous Messages       ----- Message -----   From: Treva Goncalves, RN   Sent: 4/12/2023  10:38 AM CDT   To: Deisy See Andrew Wilson MD   Subject: Myrbetriq                                         Hi Dr. Wilson,     Received fax from pharmacy today about Myrbetriq, it's very expensive for patient even with insurance.  She has tried oxybutynin in the past but it was ineffective.  Any other alternatives you would like to try?     ThanksTreva

## 2023-04-13 ENCOUNTER — ANTICOAGULATION THERAPY VISIT (OUTPATIENT)
Dept: ANTICOAGULATION | Facility: CLINIC | Age: 83
End: 2023-04-13
Payer: COMMERCIAL

## 2023-04-13 ENCOUNTER — TELEPHONE (OUTPATIENT)
Dept: INTERNAL MEDICINE | Facility: CLINIC | Age: 83
End: 2023-04-13

## 2023-04-13 DIAGNOSIS — I48.20 CHRONIC ATRIAL FIBRILLATION (H): ICD-10-CM

## 2023-04-13 DIAGNOSIS — I48.21 PERMANENT ATRIAL FIBRILLATION (H): Primary | ICD-10-CM

## 2023-04-13 DIAGNOSIS — Z79.01 LONG TERM (CURRENT) USE OF ANTICOAGULANTS: ICD-10-CM

## 2023-04-13 LAB
C DIFF TOX B STL QL: NEGATIVE
INR (EXTERNAL): 2.1 (ref 0.9–1.1)

## 2023-04-13 PROCEDURE — 87493 C DIFF AMPLIFIED PROBE: CPT | Mod: 90 | Performed by: PATHOLOGY

## 2023-04-13 PROCEDURE — 99000 SPECIMEN HANDLING OFFICE-LAB: CPT | Performed by: PATHOLOGY

## 2023-04-13 NOTE — PROGRESS NOTES
ANTICOAGULATION MANAGEMENT     Savanna Rehman 82 year old female is on warfarin with therapeutic INR result. (Goal INR 2.0-3.0)    Recent labs: (last 7 days)     04/13/23  1257   INR 2.1*       ASSESSMENT       Source(s): Chart Review and Home Care/Facility Nurse       Warfarin doses taken: Warfarin taken as instructed    Diet: No new diet changes identified    New illness, injury, or hospitalization: No    Medication/supplement changes: None noted    Signs or symptoms of bleeding or clotting: No    Previous INR: Subtherapeutic    Additional findings: home care requested to go back to their Tuesday checks         PLAN     Recommended plan for no diet, medication or health factor changes affecting INR     Dosing Instructions: Continue your current warfarin dose with next INR in 5 days       Summary  As of 4/13/2023    Full warfarin instructions:  2.5 mg every Mon, Wed, Fri; 3.75 mg all other days   Next INR check:  4/18/2023             Telephone call with [facility] home care nurse who agrees to plan and repeated back plan correctly    Orders given to  Homecare nurse/facility to recheck    Education provided:     Please call back if any changes to your diet, medications or how you've been taking warfarin    Plan made per [facility] anticoagulation protocol    Roya Sky RN  Anticoagulation Clinic  4/13/2023    _______________________________________________________________________     Anticoagulation Episode Summary     Current INR goal:  2.0-3.0   TTR:  59.4 % (1 y)   Target end date:  Indefinite   Send INR reminders to:  NIKKI CHASE    Indications    Permanent atrial fibrillation (H) [I48.21]  Chronic atrial fibrillation (H) [I48.20]  Long term (current) use of anticoagulants [Z79.01]           Comments:  Home care          Anticoagulation Care Providers     Provider Role Specialty Phone number    Patti Suárez MD Referring Internal Medicine [phone]

## 2023-04-14 ENCOUNTER — MEDICAL CORRESPONDENCE (OUTPATIENT)
Dept: HEALTH INFORMATION MANAGEMENT | Facility: CLINIC | Age: 83
End: 2023-04-14

## 2023-04-18 ENCOUNTER — ANTICOAGULATION THERAPY VISIT (OUTPATIENT)
Dept: ANTICOAGULATION | Facility: CLINIC | Age: 83
End: 2023-04-18
Payer: COMMERCIAL

## 2023-04-18 ENCOUNTER — TRANSFERRED RECORDS (OUTPATIENT)
Dept: HEALTH INFORMATION MANAGEMENT | Facility: CLINIC | Age: 83
End: 2023-04-18
Payer: COMMERCIAL

## 2023-04-18 DIAGNOSIS — I48.21 PERMANENT ATRIAL FIBRILLATION (H): Primary | ICD-10-CM

## 2023-04-18 DIAGNOSIS — Z79.01 LONG TERM (CURRENT) USE OF ANTICOAGULANTS: ICD-10-CM

## 2023-04-18 DIAGNOSIS — I48.20 CHRONIC ATRIAL FIBRILLATION (H): ICD-10-CM

## 2023-04-18 LAB — INR (EXTERNAL): 2.9 (ref 0.9–1.1)

## 2023-04-18 NOTE — PROGRESS NOTES
ANTICOAGULATION MANAGEMENT     Savanna Rehman 82 year old female is on warfarin with therapeutic INR result. (Goal INR 2.0-3.0)    Recent labs: (last 7 days)     04/18/23  1257   INR 2.9*       ASSESSMENT       Source(s): Chart Review and Home Care/Facility Nurse       Warfarin doses taken: Warfarin taken as instructed    Diet: No new diet changes identified    Medication/supplement changes: None noted    New illness, injury, or hospitalization: No    Signs or symptoms of bleeding or clotting: No    Previous INR: Therapeutic last visit; previously outside of goal range    Additional findings: None         PLAN     Recommended plan for no diet, medication or health factor changes affecting INR     Dosing Instructions: Continue your current warfarin dose with next INR in 1 week       Summary  As of 4/18/2023    Full warfarin instructions:  2.5 mg every Mon, Wed, Fri; 3.75 mg all other days   Next INR check:  4/25/2023             Telephone call with Cole Anthony  home care nurse who agrees to plan and repeated back plan correctly    Orders given to  Homecare nurse/facility to recheck    Education provided:     None required    Plan made per [facility] anticoagulation protocol    Mahsa Scott, RN  Anticoagulation Clinic  4/18/2023    _______________________________________________________________________     Anticoagulation Episode Summary     Current INR goal:  2.0-3.0   TTR:  59.4 % (1 y)   Target end date:  Indefinite   Send INR reminders to:  NIKKI CHASE    Indications    Permanent atrial fibrillation (H) [I48.21]  Chronic atrial fibrillation (H) [I48.20]  Long term (current) use of anticoagulants [Z79.01]           Comments:  Home care          Anticoagulation Care Providers     Provider Role Specialty Phone number    Patti Suárez MD Referring Internal Medicine [phone]

## 2023-04-19 ENCOUNTER — TELEPHONE (OUTPATIENT)
Dept: UROLOGY | Facility: CLINIC | Age: 83
End: 2023-04-19

## 2023-04-19 NOTE — TELEPHONE ENCOUNTER
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

## 2023-04-22 NOTE — TELEPHONE ENCOUNTER
Prior Authorization Not Needed per Insurance        Medication: mirabegron (MYRBETRIQ) 25 MG 24 hr tablet-PA NOT NEEDED   Insurance Company: [employer] - Phone [phone] Fax [phone]  Expected CoPay: $282    Pharmacy Filling the Rx: [facility] [address] [place] [address]  Pharmacy Notified: Yes  Patient Notified: No    Insurance states that PA is Not Needed and medication is covered. Called pharmacy and Pharmacy stated that they have a paid claim on medication quantity 30 for 30 day supply through patients insurance. Pharmacy stated that patient did not sign up for remainder notice when medication is ready. Cash price Quantity 30 is $450.

## 2023-04-25 ENCOUNTER — ANTICOAGULATION THERAPY VISIT (OUTPATIENT)
Dept: ANTICOAGULATION | Facility: CLINIC | Age: 83
End: 2023-04-25
Payer: COMMERCIAL

## 2023-04-25 DIAGNOSIS — Z79.01 LONG TERM (CURRENT) USE OF ANTICOAGULANTS: ICD-10-CM

## 2023-04-25 DIAGNOSIS — I48.20 CHRONIC ATRIAL FIBRILLATION (H): ICD-10-CM

## 2023-04-25 DIAGNOSIS — I48.21 PERMANENT ATRIAL FIBRILLATION (H): Primary | ICD-10-CM

## 2023-04-25 LAB — INR (EXTERNAL): 3.3 (ref 0.9–1.1)

## 2023-04-25 NOTE — PROGRESS NOTES
ANTICOAGULATION MANAGEMENT     Savanna Rehman 82 year old female is on warfarin with supratherapeutic INR result. (Goal INR 2.0-3.0)    Recent labs: (last 7 days)     04/25/23  1145   INR 3.3*       ASSESSMENT       Source(s): Chart Review and Home Care/Facility Nurse       Warfarin doses taken: Warfarin taken as instructed    Diet: No new diet changes identified    Medication/supplement changes: sertraline dose increased on last week which may be increasing INR today    New illness, injury, or hospitalization: No    Signs or symptoms of bleeding or clotting: No    Previous result: Therapeutic last 2(+) visits    Additional findings: None         PLAN     Recommended plan for ongoing change(s) affecting INR     Dosing Instructions: decrease your warfarin dose (5.6% change) with next INR in 1 week       Summary  As of 4/25/2023    Full warfarin instructions:  3.75 mg every Sun, Wed, Fri; 2.5 mg all other days   Next INR check:  5/2/2023             Telephone call with Raj home care nurse who verbalizes understanding and agrees to plan    Orders given to  Homecare nurse/facility to recheck    Education provided:     Please call back if any changes to your diet, medications or how you've been taking warfarin    Plan made per [facility] anticoagulation protocol    Roya Sky, RN  Anticoagulation Clinic  4/25/2023    _______________________________________________________________________     Anticoagulation Episode Summary     Current INR goal:  2.0-3.0   TTR:  57.9 % (1 y)   Target end date:  Indefinite   Send INR reminders to:  NIKKI CHASE    Indications    Permanent atrial fibrillation (H) [I48.21]  Chronic atrial fibrillation (H) [I48.20]  Long term (current) use of anticoagulants [Z79.01]           Comments:  Home care          Anticoagulation Care Providers     Provider Role Specialty Phone number    Patti Suárez MD Referring Internal Medicine [phone]

## 2023-04-30 ENCOUNTER — HEALTH MAINTENANCE LETTER (OUTPATIENT)
Age: 83
End: 2023-04-30

## 2023-05-02 ENCOUNTER — ANTICOAGULATION THERAPY VISIT (OUTPATIENT)
Dept: ANTICOAGULATION | Facility: CLINIC | Age: 83
End: 2023-05-02
Payer: COMMERCIAL

## 2023-05-02 DIAGNOSIS — I48.20 CHRONIC ATRIAL FIBRILLATION (H): ICD-10-CM

## 2023-05-02 DIAGNOSIS — Z79.01 LONG TERM (CURRENT) USE OF ANTICOAGULANTS: ICD-10-CM

## 2023-05-02 DIAGNOSIS — I48.21 PERMANENT ATRIAL FIBRILLATION (H): Primary | ICD-10-CM

## 2023-05-02 LAB — INR (EXTERNAL): 2.5 (ref 0.9–1.1)

## 2023-05-02 NOTE — PROGRESS NOTES
ANTICOAGULATION MANAGEMENT     Savanna Rehman 82 year old female is on warfarin with therapeutic INR result. (Goal INR 2.0-3.0)    Recent labs: (last 7 days)     05/02/23  1220   INR 2.5*       ASSESSMENT       Source(s): Chart Review and Patient/Caregiver Call       Warfarin doses taken: Warfarin taken as instructed    Diet: No new diet changes identified    Medication/supplement changes: None noted    New illness, injury, or hospitalization: No    Signs or symptoms of bleeding or clotting: No    Previous result: Supratherapeutic    Additional findings: None         PLAN     Recommended plan for no diet, medication or health factor changes affecting INR     Dosing Instructions: Continue your current warfarin dose with next INR in 1 week       Summary  As of 5/2/2023    Full warfarin instructions:  3.75 mg every Sun, Wed, Fri; 2.5 mg all other days   Next INR check:  5/9/2023             Telephone call with Raj home care nurse who verbalizes understanding and agrees to plan    Orders given to  Homecare nurse/facility to recheck    Education provided:     None required    Plan made per [facility] anticoagulation protocol    Sahra Ferrari RN  Anticoagulation Clinic  5/2/2023    _______________________________________________________________________     Anticoagulation Episode Summary     Current INR goal:  2.0-3.0   TTR:  57.3 % (1 y)   Target end date:  Indefinite   Send INR reminders to:  NIKKI CHASE    Indications    Permanent atrial fibrillation (H) [I48.21]  Chronic atrial fibrillation (H) [I48.20]  Long term (current) use of anticoagulants [Z79.01]           Comments:  Home care          Anticoagulation Care Providers     Provider Role Specialty Phone number    Patti Suárez MD Referring Internal Medicine [phone]

## 2023-05-03 ENCOUNTER — TELEPHONE (OUTPATIENT)
Dept: INTERNAL MEDICINE | Facility: CLINIC | Age: 83
End: 2023-05-03
Payer: COMMERCIAL

## 2023-05-03 NOTE — TELEPHONE ENCOUNTER
Patient calling  sertraline (ZOLOFT) 50 MG tablet is making her very tired since the increase in dose. Patient is not sure if this will get better. Patient requesting Sushant Escobar's thoughts on this problem. Patient has fallen 3 times. Paramedics called out all 3 times. Patient states she is also losing weight. Patient states she has lost 50 pounds without trying.   Please advise. Ok to call and  [phone]

## 2023-05-04 NOTE — TELEPHONE ENCOUNTER
She should discuss falls and weight loss at upcoming appointments.     She could use zoloft at night if it is making her tired. Otherwise she can take half a tablet

## 2023-05-04 NOTE — TELEPHONE ENCOUNTER
Spoke with patient and informed of provider's message below.    Reports she is currently taking Sertraline 50mg 2 tabs daily.  States she doesn't want to go back to 50mg daily so will go down to 75mg daily.    Will discuss falls with Dr. Perkins at appointment 5/12/23.

## 2023-05-08 ENCOUNTER — TELEPHONE (OUTPATIENT)
Dept: INTERNAL MEDICINE | Facility: CLINIC | Age: 83
End: 2023-05-08
Payer: COMMERCIAL

## 2023-05-09 ENCOUNTER — MEDICAL CORRESPONDENCE (OUTPATIENT)
Dept: HEALTH INFORMATION MANAGEMENT | Facility: CLINIC | Age: 83
End: 2023-05-09

## 2023-05-12 ENCOUNTER — ANTICOAGULATION THERAPY VISIT (OUTPATIENT)
Dept: ANTICOAGULATION | Facility: CLINIC | Age: 83
End: 2023-05-12

## 2023-05-12 ENCOUNTER — OFFICE VISIT (OUTPATIENT)
Dept: INTERNAL MEDICINE | Facility: CLINIC | Age: 83
End: 2023-05-12
Payer: COMMERCIAL

## 2023-05-12 VITALS
HEART RATE: 77 BPM | DIASTOLIC BLOOD PRESSURE: 66 MMHG | BODY MASS INDEX: 36.68 KG/M2 | OXYGEN SATURATION: 94 % | TEMPERATURE: 97.7 F | SYSTOLIC BLOOD PRESSURE: 108 MMHG | WEIGHT: 242 LBS | RESPIRATION RATE: 20 BRPM | HEIGHT: 68 IN

## 2023-05-12 DIAGNOSIS — R53.1 WEAKNESS: Primary | ICD-10-CM

## 2023-05-12 DIAGNOSIS — I48.20 CHRONIC ATRIAL FIBRILLATION (H): ICD-10-CM

## 2023-05-12 DIAGNOSIS — N18.31 TYPE 2 DIABETES MELLITUS WITH STAGE 3A CHRONIC KIDNEY DISEASE, WITHOUT LONG-TERM CURRENT USE OF INSULIN (H): ICD-10-CM

## 2023-05-12 DIAGNOSIS — N32.81 URGENCY-FREQUENCY SYNDROME: ICD-10-CM

## 2023-05-12 DIAGNOSIS — N39.0 RECURRENT UTI: ICD-10-CM

## 2023-05-12 DIAGNOSIS — Z79.01 LONG TERM (CURRENT) USE OF ANTICOAGULANTS: ICD-10-CM

## 2023-05-12 DIAGNOSIS — N39.41 URGENCY INCONTINENCE: ICD-10-CM

## 2023-05-12 DIAGNOSIS — R35.0 URINARY FREQUENCY: ICD-10-CM

## 2023-05-12 DIAGNOSIS — I77.810 ASCENDING AORTA DILATATION (H): ICD-10-CM

## 2023-05-12 DIAGNOSIS — E11.22 TYPE 2 DIABETES MELLITUS WITH STAGE 3A CHRONIC KIDNEY DISEASE, WITHOUT LONG-TERM CURRENT USE OF INSULIN (H): ICD-10-CM

## 2023-05-12 DIAGNOSIS — I48.21 PERMANENT ATRIAL FIBRILLATION (H): Primary | ICD-10-CM

## 2023-05-12 LAB
ALBUMIN SERPL BCG-MCNC: 4.1 G/DL (ref 3.5–5.2)
ALP SERPL-CCNC: 135 U/L (ref 35–104)
ALT SERPL W P-5'-P-CCNC: 23 U/L (ref 10–35)
ANION GAP SERPL CALCULATED.3IONS-SCNC: 11 MMOL/L (ref 7–15)
AST SERPL W P-5'-P-CCNC: 46 U/L (ref 10–35)
BASOPHILS # BLD AUTO: 0 10E3/UL (ref 0–0.2)
BASOPHILS NFR BLD AUTO: 1 %
BILIRUB SERPL-MCNC: 0.7 MG/DL
BUN SERPL-MCNC: 15.5 MG/DL (ref 8–23)
CALCIUM SERPL-MCNC: 9.4 MG/DL (ref 8.8–10.2)
CHLORIDE SERPL-SCNC: 100 MMOL/L (ref 98–107)
CK SERPL-CCNC: 50 U/L (ref 26–192)
CREAT SERPL-MCNC: 1.01 MG/DL (ref 0.51–0.95)
DEPRECATED HCO3 PLAS-SCNC: 28 MMOL/L (ref 22–29)
EOSINOPHIL # BLD AUTO: 0.3 10E3/UL (ref 0–0.7)
EOSINOPHIL NFR BLD AUTO: 6 %
ERYTHROCYTE [DISTWIDTH] IN BLOOD BY AUTOMATED COUNT: 13.6 % (ref 10–15)
GFR SERPL CREATININE-BSD FRML MDRD: 56 ML/MIN/1.73M2
GLUCOSE SERPL-MCNC: 133 MG/DL (ref 70–99)
HBA1C MFR BLD: 6.1 % (ref 0–5.6)
HCT VFR BLD AUTO: 44.8 % (ref 35–47)
HGB BLD-MCNC: 14.8 G/DL (ref 11.7–15.7)
IMM GRANULOCYTES # BLD: 0 10E3/UL
IMM GRANULOCYTES NFR BLD: 0 %
INR BLD: 1.7 (ref 0.9–1.1)
LYMPHOCYTES # BLD AUTO: 1.1 10E3/UL (ref 0.8–5.3)
LYMPHOCYTES NFR BLD AUTO: 23 %
MCH RBC QN AUTO: 30.1 PG (ref 26.5–33)
MCHC RBC AUTO-ENTMCNC: 33 G/DL (ref 31.5–36.5)
MCV RBC AUTO: 91 FL (ref 78–100)
MONOCYTES # BLD AUTO: 0.5 10E3/UL (ref 0–1.3)
MONOCYTES NFR BLD AUTO: 11 %
NEUTROPHILS # BLD AUTO: 2.9 10E3/UL (ref 1.6–8.3)
NEUTROPHILS NFR BLD AUTO: 59 %
PLATELET # BLD AUTO: 157 10E3/UL (ref 150–450)
POTASSIUM SERPL-SCNC: 4.3 MMOL/L (ref 3.4–5.3)
PROT SERPL-MCNC: 7.8 G/DL (ref 6.4–8.3)
RBC # BLD AUTO: 4.91 10E6/UL (ref 3.8–5.2)
SODIUM SERPL-SCNC: 139 MMOL/L (ref 136–145)
TSH SERPL DL<=0.005 MIU/L-ACNC: 2.23 UIU/ML (ref 0.3–4.2)
WBC # BLD AUTO: 4.9 10E3/UL (ref 4–11)

## 2023-05-12 PROCEDURE — 36415 COLL VENOUS BLD VENIPUNCTURE: CPT | Performed by: INTERNAL MEDICINE

## 2023-05-12 PROCEDURE — 84443 ASSAY THYROID STIM HORMONE: CPT | Performed by: INTERNAL MEDICINE

## 2023-05-12 PROCEDURE — 85025 COMPLETE CBC W/AUTO DIFF WBC: CPT | Performed by: INTERNAL MEDICINE

## 2023-05-12 PROCEDURE — 80053 COMPREHEN METABOLIC PANEL: CPT | Performed by: INTERNAL MEDICINE

## 2023-05-12 PROCEDURE — 83036 HEMOGLOBIN GLYCOSYLATED A1C: CPT | Performed by: INTERNAL MEDICINE

## 2023-05-12 PROCEDURE — 85610 PROTHROMBIN TIME: CPT | Performed by: INTERNAL MEDICINE

## 2023-05-12 PROCEDURE — 99214 OFFICE O/P EST MOD 30 MIN: CPT | Performed by: INTERNAL MEDICINE

## 2023-05-12 PROCEDURE — 82550 ASSAY OF CK (CPK): CPT | Performed by: INTERNAL MEDICINE

## 2023-05-12 RX ORDER — LOPERAMIDE HYDROCHLORIDE 2 MG/1
2 TABLET ORAL 2 TIMES DAILY
Status: ON HOLD | COMMUNITY
End: 2024-04-01

## 2023-05-12 ASSESSMENT — ENCOUNTER SYMPTOMS
CARDIOVASCULAR NEGATIVE: 1
FATIGUE: 1
WEAKNESS: 1
ARTHRALGIAS: 1
RESPIRATORY NEGATIVE: 1
GASTROINTESTINAL NEGATIVE: 1

## 2023-05-12 NOTE — PROGRESS NOTES
ANTICOAGULATION MANAGEMENT     Savanna Rehman 82 year old female is on warfarin with subtherapeutic INR result. (Goal INR 2.0-3.0)    Recent labs: (last 7 days)     05/12/23  1059   INR 1.7*       ASSESSMENT       Source(s): Chart Review and Patient/Caregiver Call       Warfarin doses taken: Warfarin taken as instructed    Diet: No new diet changes identified    Medication/supplement changes: None noted    New illness, injury, or hospitalization: No    Signs or symptoms of bleeding or clotting: No    Previous result: Therapeutic last visit; previously outside of goal range    Additional findings: None         PLAN     Recommended plan for no diet, medication or health factor changes affecting INR     Dosing Instructions: booster dose then continue your current warfarin dose with next INR in 4 days       Summary  As of 5/12/2023    Full warfarin instructions:  5/12: 5 mg; Otherwise 3.75 mg every Sun, Wed, Fri; 2.5 mg all other days   Next INR check:  5/19/2023             Telephone call with Savanna who verbalizes understanding and agrees to plan    Orders given to  Homecare nurse/facility to recheck    Education provided:     Please call back if any changes to your diet, medications or how you've been taking warfarin    Goal range and lab monitoring: goal range and significance of current result, Importance of therapeutic range and Importance of following up at instructed interval    Symptom monitoring: monitoring for clotting signs and symptoms and monitoring for stroke signs and symptoms    Plan made per [facility] anticoagulation protocol    Dottie Villalta, RN  Anticoagulation Clinic  5/12/2023    _______________________________________________________________________     Anticoagulation Episode Summary     Current INR goal:  2.0-3.0   TTR:  57.3 % (1 y)   Target end date:  Indefinite   Send INR reminders to:  NIKKI CHASE    Indications    Permanent atrial fibrillation (H) [I48.21]  Chronic atrial fibrillation  (H) [I48.20]  Long term (current) use of anticoagulants [Z79.01]           Comments:  Home care          Anticoagulation Care Providers     Provider Role Specialty Phone number    Patti Suárez MD Referring Internal Medicine [phone]

## 2023-05-12 NOTE — NURSING NOTE
"Chief Complaint   Patient presents with     Medication Problem     Fall     Multiple falls     initial /66   Pulse 77   Temp 97.7  F (36.5  C) (Oral)   Resp 20   Ht 1.727 m (5' 8\")   Wt 109.8 kg (242 lb)   LMP  (LMP Unknown)   SpO2 94%   BMI 36.80 kg/m   Estimated body mass index is 36.8 kg/m  as calculated from the following:    Height as of this encounter: 1.727 m (5' 8\").    Weight as of this encounter: 109.8 kg (242 lb)..  bp completed using cuff size large  JOSE BOWIE LPN  "

## 2023-05-12 NOTE — PROGRESS NOTES
"  Assessment & Plan     Weakness  At this time, patient does have a relatively unremarkable physical examination.  She is noted to have an irregularly, irregular heart rhythm consistent with atrial fibrillation, but her pulse rate was noted to be 77.  We did spend some time discussing potential etiologies of her weakness including anemia, electrolyte abnormalities, blood sugar issues, and thyroid disease.  Given that she did have recent EKG in March, patient did declined to have a repeat EKG at this time.  She did wish to see the results of her labs before proceeding with any further diagnostic testing or evaluation.  She will be contacted once her labs are available for review.  - Comprehensive metabolic panel (BMP + Alb, Alk Phos, ALT, AST, Total. Bili, TP); Future  - CBC with platelets and differential; Future  - TSH with free T4 reflex; Future  - CK total; Future    Type 2 diabetes mellitus with stage 3a chronic kidney disease, without long-term current use of insulin (H)  Patient's diabetes have previously been under good control, but we will check a repeat hemoglobin A1c given her recent reports of weakness and shaking.  Labs are currently pending.  Patient will continue her glipizide as currently prescribed for the time being.  Patient will be contacted as soon as her lab results are available for review.  - Hemoglobin A1c; Future      Ascending aorta dilatation (H)  Followed by cardiology.      Ordering of each unique test  30 minutes spent by me on the date of the encounter doing chart review, history and exam, documentation and further activities per the note       BMI:   Estimated body mass index is 36.8 kg/m  as calculated from the following:    Height as of this encounter: 1.727 m (5' 8\").    Weight as of this encounter: 109.8 kg (242 lb).   Weight management plan: Discussed healthy diet and exercise guidelines    See Patient Instructions    Alberto Perkins MD  [facility] " SHELLY Corrales is a 82 year old, presenting for the following health issues:  Medication Problem and Fall (Multiple falls)        5/12/2023    10:14 AM   Additional Questions   Roomed by Cony TAVARES LPN     Patient is an 82-year-old  female with complicated past medical history who presents to the clinic with a chief complaint of weakness.  Patient reports that for the past few months she has had multiple falls.  She reports a total of 4 falls that did require paramedics to assist getting her off the floor.  Patient states that when these falls have occurred she has felt very weak all over, but her weakness seemed to be most pronounced in her lower extremities.  Patient would drop to her knees and then slide the rest of the way to the floor.  She denied any issues with headaches, chest pains, shortness of breath or syncopal episodes associated with her falls.  Patient has recently had some issues with diarrhea, but it has been controlled with loperamide.  Otherwise, she has had no changes in her medication regimen.  She does report some issues with fatigue.  Patient's appetite has remained unchanged.  She is urinating without issue.  Patient does have a prior history of anemia, but she states that was several years ago.  Her diabetes have been under good control, as her most recent hemoglobin A1c was noted to be7.3 in February 2023.  Patient does have a history of atrial fibrillation, but she has not noted any issues with chest pains, shortness of breath, or any other cardiac symptoms.  Her last EKG was performed in March as part of a preoperative evaluation.  EKG did show atrial fibrillation, but she was rate controlled.           Review of Systems   Constitutional: Positive for fatigue.   HENT: Negative.    Respiratory: Negative.    Cardiovascular: Negative.    Gastrointestinal: Negative.    Genitourinary: Negative.    Musculoskeletal: Positive for arthralgias.   Neurological: Positive  "for weakness.            Objective    Blood pressure 108/66, pulse 77, temperature 97.7  F (36.5  C), temperature source Oral, resp. rate 20, height 1.727 m (5' 8\"), weight 109.8 kg (242 lb), SpO2 94 %, not currently breastfeeding.    Physical Exam  Vitals reviewed.   HENT:      Head: Normocephalic and atraumatic.      Mouth/Throat:      Mouth: Mucous membranes are moist.      Pharynx: Oropharynx is clear.   Eyes:      Extraocular Movements: Extraocular movements intact.      Conjunctiva/sclera: Conjunctivae normal.      Pupils: Pupils are equal, round, and reactive to light.   Cardiovascular:      Rate and Rhythm: Normal rate. Rhythm irregular.      Pulses: Normal pulses.      Heart sounds: Normal heart sounds.   Pulmonary:      Effort: Pulmonary effort is normal.      Breath sounds: Normal breath sounds.   Skin:     General: Skin is warm and dry.      Capillary Refill: Capillary refill takes less than 2 seconds.   Neurological:      Mental Status: She is alert and oriented to person, place, and time.        Diagnostic Test Results: CMP, CBC, TSH, hemoglobin A1c, and creatinine kinase levels are pending.        "

## 2023-05-13 ENCOUNTER — NURSE TRIAGE (OUTPATIENT)
Dept: NURSING | Facility: CLINIC | Age: 83
End: 2023-05-13
Payer: COMMERCIAL

## 2023-05-13 NOTE — TELEPHONE ENCOUNTER
Triage Call:    Caller: Patient    Pt called in because she is having issues seeing her lab numbers in MyChart. She was questioning both her INR and hemoglobin.  She reports that was told INR was 1.3 but it is 1.7.  She is unable to see the my chart currently RN offered to get her assistance with the MyChart but she wasn't able to do that at this time but will call back for additional help.  No s/sx to triage at this time.    No further questions    Protocol Recommended Disposition: Home care - Information only call     Caller verbalized understanding of instructions and questions answered.      Kristy Hernandez, RN, [place], PHN on 5/13/2023 at 9:49 AM  [facility]  RN utilized sound nursing judgement based on facility triage protocols during this encounter.       Reason for Disposition    Health Information question, no triage required and triager able to answer question    Additional Information    Negative: RN needs further essential information from caller in order to complete triage    Negative: Requesting regular office appointment    Negative: [1] Caller requesting NON-URGENT health information AND [2] PCP's office is the best resource    Protocols used: INFORMATION ONLY CALL - NO TRIAGE-A-

## 2023-05-15 ENCOUNTER — TELEPHONE (OUTPATIENT)
Dept: INTERNAL MEDICINE | Facility: CLINIC | Age: 83
End: 2023-05-15
Payer: COMMERCIAL

## 2023-05-16 ENCOUNTER — ANTICOAGULATION THERAPY VISIT (OUTPATIENT)
Dept: ANTICOAGULATION | Facility: CLINIC | Age: 83
End: 2023-05-16
Payer: COMMERCIAL

## 2023-05-16 ENCOUNTER — TRANSFERRED RECORDS (OUTPATIENT)
Dept: HEALTH INFORMATION MANAGEMENT | Facility: CLINIC | Age: 83
End: 2023-05-16

## 2023-05-16 ENCOUNTER — TELEPHONE (OUTPATIENT)
Dept: INTERNAL MEDICINE | Facility: CLINIC | Age: 83
End: 2023-05-16
Payer: COMMERCIAL

## 2023-05-16 DIAGNOSIS — I48.20 CHRONIC ATRIAL FIBRILLATION (H): ICD-10-CM

## 2023-05-16 DIAGNOSIS — I48.21 PERMANENT ATRIAL FIBRILLATION (H): Primary | ICD-10-CM

## 2023-05-16 DIAGNOSIS — Z79.01 LONG TERM (CURRENT) USE OF ANTICOAGULANTS: ICD-10-CM

## 2023-05-16 LAB — INR (EXTERNAL): 3 (ref 0.9–1.1)

## 2023-05-16 NOTE — PROGRESS NOTES
ANTICOAGULATION MANAGEMENT     Savanna Rehman 82 year old female is on warfarin with therapeutic INR result. (Goal INR 2.0-3.0)    Recent labs: (last 7 days)     05/16/23  1319   INR 3.0*       ASSESSMENT       Source(s): Chart Review, Patient/Caregiver Call and Home Care/Facility Nurse       Warfarin doses taken: Warfarin taken as instructed    Diet: No new diet changes identified    Medication/supplement changes: None noted    New illness, injury, or hospitalization: No    Signs or symptoms of bleeding or clotting: No    Previous result: Subtherapeutic    Additional findings: None         PLAN     Recommended plan for no diet, medication or health factor changes affecting INR     Dosing Instructions: Continue your current warfarin dose with next INR in 1 week       Summary  As of 5/16/2023    Full warfarin instructions:  3.75 mg every Sun, Wed, Fri; 2.5 mg all other days   Next INR check:  5/23/2023             Telephone call with Raj home care nurse who agrees to plan and repeated back plan correctly    Orders given to  Homecare nurse/facility to recheck    Education provided:     Please call back if any changes to your diet, medications or how you've been taking warfarin    Plan made per [facility] anticoagulation protocol    Azul Whitaker, RN  Anticoagulation Clinic  5/16/2023    _______________________________________________________________________     Anticoagulation Episode Summary     Current INR goal:  2.0-3.0   TTR:  57.5 % (1 y)   Target end date:  Indefinite   Send INR reminders to:  NIKKI CHASE    Indications    Permanent atrial fibrillation (H) [I48.21]  Chronic atrial fibrillation (H) [I48.20]  Long term (current) use of anticoagulants [Z79.01]           Comments:  Home care          Anticoagulation Care Providers     Provider Role Specialty Phone number    Patti Suárez MD Referring Internal Medicine [phone]

## 2023-05-16 NOTE — TELEPHONE ENCOUNTER
Fax received from [facility] 05/16/23 for review and signature.  Put in Dr. Lauren's in basket.

## 2023-05-17 ENCOUNTER — TELEPHONE (OUTPATIENT)
Dept: INTERNAL MEDICINE | Facility: CLINIC | Age: 83
End: 2023-05-17
Payer: COMMERCIAL

## 2023-05-17 DIAGNOSIS — R74.8 ELEVATED LIVER ENZYMES: Primary | ICD-10-CM

## 2023-05-17 NOTE — TELEPHONE ENCOUNTER
Patient calling inquiring about her recent lab results.  Reports she cannot get into [facility] to view provider's message.    This RN read patient the result note message from Dr. Perkins 5/12/23.    1.  Patient would like to proceed with liver US.  Please place future order.    2.  Patient has already been referred to neuro - [facility] and reports they are scheduled out months for an appointment.  Is there a different neuro clinic she can be referred to?  Would like to stay south of the [place].    Please advise, thanks.

## 2023-05-19 DIAGNOSIS — Z53.9 DIAGNOSIS NOT YET DEFINED: Primary | ICD-10-CM

## 2023-05-19 PROCEDURE — G0179 MD RECERTIFICATION HHA PT: HCPCS | Performed by: INTERNAL MEDICINE

## 2023-05-23 ENCOUNTER — ANTICOAGULATION THERAPY VISIT (OUTPATIENT)
Dept: ANTICOAGULATION | Facility: CLINIC | Age: 83
End: 2023-05-23
Payer: COMMERCIAL

## 2023-05-23 DIAGNOSIS — I48.21 PERMANENT ATRIAL FIBRILLATION (H): Primary | ICD-10-CM

## 2023-05-23 DIAGNOSIS — I48.20 CHRONIC ATRIAL FIBRILLATION (H): ICD-10-CM

## 2023-05-23 DIAGNOSIS — Z79.01 LONG TERM (CURRENT) USE OF ANTICOAGULANTS: ICD-10-CM

## 2023-05-23 LAB — INR (EXTERNAL): 2 (ref 0.9–1.1)

## 2023-05-23 NOTE — PROGRESS NOTES
ANTICOAGULATION MANAGEMENT     Savanna Rehman 82 year old female is on warfarin with therapeutic INR result. (Goal INR 2.0-3.0)    Recent labs: (last 7 days)     05/23/23  0000   INR 2.0*       ASSESSMENT       Source(s): Chart Review and Home Care/Facility Nurse       Warfarin doses taken: Missed dose(s) may be affecting INR    Diet: No new diet changes identified    Medication/supplement changes: None noted    New illness, injury, or hospitalization: No    Signs or symptoms of bleeding or clotting: No    Previous result: Therapeutic last visit; previously outside of goal range    Additional findings: None         PLAN     Recommended plan for no diet, medication or health factor changes affecting INR     Dosing Instructions: Continue your current warfarin dose with next INR in 1 week       Summary  As of 5/23/2023    Full warfarin instructions:  3.75 mg every Sun, Wed, Fri; 2.5 mg all other days   Next INR check:  5/30/2023             Telephone call with Raj home care nurse who verbalizes understanding and agrees to plan    Orders given to  Homecare nurse/facility to recheck    Education provided:     None required    Plan made per [facility] anticoagulation protocol    Dawood aMrtinez RN  Anticoagulation Clinic  5/23/2023    _______________________________________________________________________     Anticoagulation Episode Summary     Current INR goal:  2.0-3.0   TTR:  58.4 % (1 y)   Target end date:  Indefinite   Send INR reminders to:  NIKKI CHASE    Indications    Permanent atrial fibrillation (H) [I48.21]  Chronic atrial fibrillation (H) [I48.20]  Long term (current) use of anticoagulants [Z79.01]           Comments:  Home care          Anticoagulation Care Providers     Provider Role Specialty Phone number    Patti Suárez MD Referring Internal Medicine [phone]

## 2023-05-25 ENCOUNTER — TELEPHONE (OUTPATIENT)
Dept: INTERNAL MEDICINE | Facility: CLINIC | Age: 83
End: 2023-05-25
Payer: COMMERCIAL

## 2023-05-25 NOTE — TELEPHONE ENCOUNTER
Patient calls again. States she has a ride today to be able to  medications. She is getting nervous about getting a Rx. Will forward to team

## 2023-05-25 NOTE — TELEPHONE ENCOUNTER
Patient calls and is having problems with shaking and weakness in her legs and from her shoulders to her finger tips. Patient has already fallen four times.Patient knows several people who take medication for this and is wondering if she could be put on something that would not interact with any of the medications she is already taking.    Patient has a ride to [place] today at about 11:30 am.  If you are able to prescribe please send asap to the Pharmacy.

## 2023-05-26 ENCOUNTER — TRANSFERRED RECORDS (OUTPATIENT)
Dept: HEALTH INFORMATION MANAGEMENT | Facility: CLINIC | Age: 83
End: 2023-05-26
Payer: COMMERCIAL

## 2023-05-26 LAB — RETINOPATHY: NEGATIVE

## 2023-05-26 NOTE — TELEPHONE ENCOUNTER
ATTENTION Leonidas LEWIS   [facility] - Status Update    Who is Calling: patient    Update: see below     Does caller want a call/response back: Yes, ASAP on Friday 5/26      Patient called back asking if Dr. Lauren has received her message about medication yet. Can you please have her call on Friday 5/26? Thank you.

## 2023-05-26 NOTE — TELEPHONE ENCOUNTER
Patient says she does not have a neurologist because she was offered an appointment with one in [place] but she cannot drive to or get to that location. I scheduled her an appointment with Dr Lauren.

## 2023-05-26 NOTE — TELEPHONE ENCOUNTER
Patient calls again asking for a script.     Patient was gently informed that to go to the pharmacy and then call her Dr office and demand a script be sent right away is not in her best interest. The Md's are seeing patient and managing multiple messages as best they can.

## 2023-05-26 NOTE — TELEPHONE ENCOUNTER
I am not certain as to what medication she may be referring to to prevent shaking and falls.  I do think that it would be better for her to get into the neurologist for further evaluation of her symptoms given that her recent lab work was unrevealing and the cause of her symptoms.

## 2023-05-30 ENCOUNTER — ANTICOAGULATION THERAPY VISIT (OUTPATIENT)
Dept: ANTICOAGULATION | Facility: CLINIC | Age: 83
End: 2023-05-30
Payer: COMMERCIAL

## 2023-05-30 DIAGNOSIS — Z79.01 LONG TERM (CURRENT) USE OF ANTICOAGULANTS: ICD-10-CM

## 2023-05-30 DIAGNOSIS — I48.20 CHRONIC ATRIAL FIBRILLATION (H): ICD-10-CM

## 2023-05-30 DIAGNOSIS — I48.21 PERMANENT ATRIAL FIBRILLATION (H): Primary | ICD-10-CM

## 2023-05-30 LAB — INR (EXTERNAL): 2.5 (ref 0.9–1.1)

## 2023-05-30 NOTE — PROGRESS NOTES
ANTICOAGULATION MANAGEMENT     Savanna Rehman 82 year old female is on warfarin with therapeutic INR result. (Goal INR 2.0-3.0)    Recent labs: (last 7 days)     05/30/23  1251   INR 2.5*       ASSESSMENT       Source(s): Chart Review and Home Care/Facility Nurse       Warfarin doses taken: Warfarin taken as instructed    Diet: No new diet changes identified    Medication/supplement changes: None noted    New illness, injury, or hospitalization: No    Signs or symptoms of bleeding or clotting: No    Previous result: Therapeutic last 2(+) visits    Additional findings: None         PLAN     Recommended plan for no diet, medication or health factor changes affecting INR     Dosing Instructions: Continue your current warfarin dose with next INR in 2 weeks       Summary  As of 5/30/2023    Full warfarin instructions:  3.75 mg every Sun, Wed, Fri; 2.5 mg all other days   Next INR check:  6/13/2023             Telephone call with Cole Anthony home care nurse who verbalizes understanding and agrees to plan    Orders given to  Homecare nurse/facility to recheck    Education provided:     None required    Plan made per [facility] anticoagulation protocol    Mahsa Scott, RN  Anticoagulation Clinic  5/30/2023    _______________________________________________________________________     Anticoagulation Episode Summary     Current INR goal:  2.0-3.0   TTR:  59.5 % (1 y)   Target end date:  Indefinite   Send INR reminders to:  NIKKI CHASE    Indications    Permanent atrial fibrillation (H) [I48.21]  Chronic atrial fibrillation (H) [I48.20]  Long term (current) use of anticoagulants [Z79.01]           Comments:  Home care          Anticoagulation Care Providers     Provider Role Specialty Phone number    Patti Suárez MD Referring Internal Medicine [phone]

## 2023-06-02 ENCOUNTER — OFFICE VISIT (OUTPATIENT)
Dept: INTERNAL MEDICINE | Facility: CLINIC | Age: 83
End: 2023-06-02
Payer: COMMERCIAL

## 2023-06-02 VITALS
OXYGEN SATURATION: 94 % | DIASTOLIC BLOOD PRESSURE: 68 MMHG | HEART RATE: 85 BPM | RESPIRATION RATE: 18 BRPM | SYSTOLIC BLOOD PRESSURE: 104 MMHG | BODY MASS INDEX: 37.04 KG/M2 | HEIGHT: 68 IN | TEMPERATURE: 98.1 F | WEIGHT: 244.4 LBS

## 2023-06-02 DIAGNOSIS — N18.31 TYPE 2 DIABETES MELLITUS WITH STAGE 3A CHRONIC KIDNEY DISEASE, WITHOUT LONG-TERM CURRENT USE OF INSULIN (H): ICD-10-CM

## 2023-06-02 DIAGNOSIS — M62.81 GENERALIZED MUSCLE WEAKNESS: ICD-10-CM

## 2023-06-02 DIAGNOSIS — R25.1 TREMOR: ICD-10-CM

## 2023-06-02 DIAGNOSIS — R43.0 ANOSMIA: ICD-10-CM

## 2023-06-02 DIAGNOSIS — E11.22 TYPE 2 DIABETES MELLITUS WITH STAGE 3A CHRONIC KIDNEY DISEASE, WITHOUT LONG-TERM CURRENT USE OF INSULIN (H): ICD-10-CM

## 2023-06-02 DIAGNOSIS — R60.0 BILATERAL LEG EDEMA: Primary | ICD-10-CM

## 2023-06-02 DIAGNOSIS — I48.20 CHRONIC ATRIAL FIBRILLATION (H): ICD-10-CM

## 2023-06-02 PROBLEM — R19.5 ABNORMAL FECES: Status: ACTIVE | Noted: 2023-04-18

## 2023-06-02 PROBLEM — R10.30 LOWER ABDOMINAL PAIN: Status: ACTIVE | Noted: 2023-06-02

## 2023-06-02 PROBLEM — R19.5 LOOSE STOOLS: Status: ACTIVE | Noted: 2023-06-02

## 2023-06-02 PROBLEM — R15.2 FECAL URGENCY: Status: ACTIVE | Noted: 2023-06-02

## 2023-06-02 LAB
ALBUMIN SERPL BCG-MCNC: 4.2 G/DL (ref 3.5–5.2)
ALP SERPL-CCNC: 146 U/L (ref 35–104)
ALT SERPL W P-5'-P-CCNC: 26 U/L (ref 10–35)
ANION GAP SERPL CALCULATED.3IONS-SCNC: 10 MMOL/L (ref 7–15)
AST SERPL W P-5'-P-CCNC: 49 U/L (ref 10–35)
BILIRUB SERPL-MCNC: 0.6 MG/DL
BUN SERPL-MCNC: 13.2 MG/DL (ref 8–23)
CALCIUM SERPL-MCNC: 9.5 MG/DL (ref 8.8–10.2)
CHLORIDE SERPL-SCNC: 100 MMOL/L (ref 98–107)
CREAT SERPL-MCNC: 1.11 MG/DL (ref 0.51–0.95)
DEPRECATED HCO3 PLAS-SCNC: 29 MMOL/L (ref 22–29)
FOLATE SERPL-MCNC: 5.4 NG/ML (ref 4.6–34.8)
GFR SERPL CREATININE-BSD FRML MDRD: 50 ML/MIN/1.73M2
GLUCOSE SERPL-MCNC: 122 MG/DL (ref 70–99)
MAGNESIUM SERPL-MCNC: 2 MG/DL (ref 1.7–2.3)
POTASSIUM SERPL-SCNC: 5 MMOL/L (ref 3.4–5.3)
PROT SERPL-MCNC: 7.7 G/DL (ref 6.4–8.3)
SODIUM SERPL-SCNC: 139 MMOL/L (ref 136–145)
VIT B12 SERPL-MCNC: 523 PG/ML (ref 232–1245)

## 2023-06-02 PROCEDURE — 99214 OFFICE O/P EST MOD 30 MIN: CPT | Performed by: INTERNAL MEDICINE

## 2023-06-02 PROCEDURE — 99000 SPECIMEN HANDLING OFFICE-LAB: CPT | Performed by: INTERNAL MEDICINE

## 2023-06-02 PROCEDURE — 84425 ASSAY OF VITAMIN B-1: CPT | Mod: 90 | Performed by: INTERNAL MEDICINE

## 2023-06-02 PROCEDURE — 36415 COLL VENOUS BLD VENIPUNCTURE: CPT | Performed by: INTERNAL MEDICINE

## 2023-06-02 PROCEDURE — 80053 COMPREHEN METABOLIC PANEL: CPT | Performed by: INTERNAL MEDICINE

## 2023-06-02 PROCEDURE — 82746 ASSAY OF FOLIC ACID SERUM: CPT | Performed by: INTERNAL MEDICINE

## 2023-06-02 PROCEDURE — 82607 VITAMIN B-12: CPT | Performed by: INTERNAL MEDICINE

## 2023-06-02 PROCEDURE — 83735 ASSAY OF MAGNESIUM: CPT | Performed by: INTERNAL MEDICINE

## 2023-06-02 PROCEDURE — 82306 VITAMIN D 25 HYDROXY: CPT | Performed by: INTERNAL MEDICINE

## 2023-06-02 RX ORDER — INDAPAMIDE 1.25 MG/1
1.25 TABLET ORAL EVERY MORNING
Qty: 30 TABLET | Refills: 0 | Status: SHIPPED | OUTPATIENT
Start: 2023-06-02 | End: 2023-07-25

## 2023-06-02 ASSESSMENT — PAIN SCALES - GENERAL: PAINLEVEL: NO PAIN (0)

## 2023-06-02 NOTE — PROGRESS NOTES
Dr Lauren's note      Patient's instructions / PLAN:                                                        Plan:  1.  Labs today - [address]   2. Start Indapamide 1 tablet daily - for leg edema   3. Continue the other meds, same doses for now.  4. Your provider has referred you to: N:  [facility] [phone]   5. Schedule a follow up. appointment in about 3-4 weeks   (SD ok)      ASSESSMENT & PLAN:                                                      (R60.0) Bilateral leg edema  (primary encounter diagnosis)  Comment:   We discussed about the new meds, advantages and potential side effects. The patient will read also the info from the pharmacy and call back if questions.   Plan: indapamide (LOZOL) 1.25 MG tablet,         Comprehensive metabolic panel, Magnesium,         Vitamin B12, Vitamin B1 whole blood, Vitamin D         Deficiency, Folate            (R25.1) Tremor  Comment: new  Plan: Comprehensive metabolic panel, Magnesium,         Vitamin B12, Vitamin B1 whole blood, Vitamin D         Deficiency, Folate, Adult Neurology          Referral            (M62.81) Generalized muscle weakness  Comment:   Plan: Comprehensive metabolic panel, Magnesium,         Vitamin B12, Vitamin B1 whole blood, Vitamin D         Deficiency, Folate, Adult Neurology          Referral            (I48.20) Chronic atrial fibrillation (H)  Comment: rate controlled   Plan: Comprehensive metabolic panel, Magnesium,         Vitamin B12, Vitamin B1 whole blood, Vitamin D         Deficiency, Folate            (E11.22,  N18.31) Type 2 diabetes mellitus with stage 3a chronic kidney disease, without long-term current use of insulin (H)  Comment: Controlled    Plan: Comprehensive metabolic panel, Magnesium,         Vitamin B12, Vitamin B1 whole blood, Vitamin D         Deficiency, Folate            (R43.0) Anosmia  Comment:   Plan:        Chief complaint:                                                       Follow up chronic medical problems   Legs weakness  Tremor      SUBJECTIVE:                                                    History of present illness:    Tremor  -- hands   -- sometimes severe she can't hold a glass of water     Leg Weakness  -- 4 falls in the last 12 m    GI  -- no dairy diet - as per GI    Leg edema  -- 4-5 lbs more since March 2023   -- LVEF 55-60% in 2021       She lost the sense of smell after 2 episodes of Covid     Subjective   Savanna is a 82 year old, presenting for the following health issues:  Patient is being seen to discuss shaking and weakness.      06/02/2023   8:39 AM   Additional Questions   Roomed by Lisseth Mckeno     HPI     Diabetes Follow-up    How often are you checking your blood sugar? A few times a month  What time of day are you checking your blood sugars (select all that apply)?  Before and after meals  Have you had any blood sugars above 200?  No  Have you had any blood sugars below 70?  No    What symptoms do you notice when your blood sugar is low?  None    What concerns do you have today about your diabetes? None     Do you have any of these symptoms? (Select all that apply)      Have you had a diabetic eye exam in the last 12 months? No        BP Readings from Last 2 Encounters:   05/12/23 108/66   03/30/23 110/74     Hemoglobin A1C (%)   Date Value   05/12/2023 6.1 (H)   02/06/2023 7.3 (H)   02/22/2021 6.3 (H)   09/14/2020 6.4 (H)     LDL Cholesterol Calculated (mg/dL)   Date Value   11/07/2022 76   06/02/2022 100   02/22/2021 107 (H)   03/04/2020 111 (H)           Hyperlipidemia Follow-Up      Are you regularly taking any medication or supplement to lower your cholesterol?   No    Are you having muscle aches or other side effects that you think could be caused by your cholesterol lowering medication?  No      How many servings of fruits and vegetables do you eat daily?  2-3    On average, how many sweetened beverages do you drink each day  "(Examples: soda, juice, sweet tea, etc.  Do NOT count diet or artificially sweetened beverages)?   1    How many days per week do you exercise enough to make your heart beat faster? 3 or less    How many minutes a day do you exercise enough to make your heart beat faster? 9 or less    How many days per week do you miss taking your medication? 0    Review of Systems:                                                      ROS: negative for fever, chills, cough, wheezes, chest pain, shortness of breath, vomiting, abdominal pain, leg swelling         OBJECTIVE:             Physical exam:  Blood pressure 104/68, pulse 85, temperature 98.1  F (36.7  C), temperature source Tympanic, resp. rate 18, height 1.727 m (5' 8\"), weight 110.9 kg (244 lb 6.4 oz), SpO2 94 %, not currently breastfeeding.     NAD, appears comfortable  Skin: no rashes   Neck: supple, no JVD,  No thyroidmegaly. Lymph nodes nonpalpable cervical and supraclavicular.  Chest: clear to auscultation bilaterally, good respiratory effort  Heart: S1 S2, RRR, no mgr appreciated  Abdomen: soft, not tender,   Extremities: +3 edema  Neurologic: A, Ox3, no focal signs appreciated    PMHx: reviewed  Past Medical History:   Diagnosis Date     Anemia     Iron Deficiency anemia     Atrial fibrillation (H)      CAD (coronary artery disease)     non-obstructive     Chronic pain     neck, low back, legs     Congestive heart failure (H)      Degenerative disk disease      Diabetes (H)      Fibromyalgia      Gastro-oesophageal reflux disease      Gout      Hiatal hernia      Mumps      Neuropathy      CRISTIANA (obstructive sleep apnea) - CPAP      Palpitations      Pernicious anemia      Sleep apnea     uses CPAP.     Urinary incontinence      Vitamin D deficiency       PSHx: reviewed  Past Surgical History:   Procedure Laterality Date     APPENDECTOMY       CHOLECYSTECTOMY       COLONOSCOPY  3/15/2011     COLONOSCOPY N/A 3/15/2023    Procedure: COLONOSCOPY, WITH POLYPECTOMY AND " BIOPSY;  Surgeon: Maci Coronel MD;  Location:  GI     COLONOSCOPY N/A 3/15/2023    Procedure: COLONOSCOPY, FLEXIBLE, WITH LESION REMOVAL USING SNARE;  Surgeon: Maci Coronel MD;  Location:  GI     CORONARY ANGIOGRAPHY ADULT ORDER       CYSTOSCOPY, BIOPSY BLADDER, COMBINED N/A 7/13/2020    Procedure: CYSTOSCOPY, WITH BLADDER BIOPSY;  Surgeon: Deisy Wilson MD;  Location: [facility] OR     HEART CATH LEFT HEART CATH  12/30/16    medication management     HYSTERECTOMY TOTAL ABDOMINAL       Knee replacement NOS Left      LAPAROSCOPIC NISSEN FUNDOPLICATION N/A 2/4/2015    Procedure: LAPAROSCOPIC NISSEN FUNDOPLICATION;  Surgeon: Armando Ansari MD;  Location:  OR     TONSILLECTOMY       TRANSPOSITION ULNAR NERVE (ELBOW)          Meds: reviewed  Current Outpatient Medications   Medication Sig Dispense Refill     ACE/ARB/ARNI NOT PRESCRIBED (INTENTIONAL) Please choose reason not prescribed, below       blood glucose (NO BRAND SPECIFIED) lancets standard Use to test blood sugar  as directed. 1 each 0     blood glucose (NO BRAND SPECIFIED) lancing device Device to be used with lancets. Patient requests Amol Microlet 2 lancing device to check blood glucose once per day. 1 each 0     blood glucose (ONETOUCH ULTRA) test strip Use to test blood sugar 1 times daily 100 strip 1     blood glucose calibration (NO BRAND SPECIFIED) solution Use to calibrate blood glucose monitor 1 Bottle 3     blood glucose monitoring (NO BRAND SPECIFIED) meter device kit Use to test blood sugar 1 time daily 1 kit 0     butalbital-aspirin-caffeine (FIORINAL) -40 MG capsule TAKE 1 CAPSULE BY MOUTH EVERY 6 HOURS AS NEEDED FOR HEADACHE 15 capsule 0     EPINEPHrine (ANY BX GENERIC EQUIV) 0.3 MG/0.3ML injection 2-pack INJECT 0.3MLS (0.3MG) INTO THE MUSCLE ONCE AS NEEDED FOR ANAPHYLAXIS 2 each 1     glipiZIDE (GLUCOTROL XL) 2.5 MG 24 hr tablet Take 1 tablet (2.5 mg) by mouth 2 times daily 180 tablet 3     indapamide (LOZOL)  1.25 MG tablet Take 1 tablet (1.25 mg) by mouth every morning 30 tablet 0     loperamide (IMODIUM A-D) 2 MG tablet Take 2 mg by mouth 4 times daily as needed for diarrhea       meclizine (ANTIVERT) 12.5 MG tablet TAKE 1 TABLET BY MOUTH THREE TIMES DAILY AS NEEDED FOR DIZZINESS 30 tablet 1     METAMUCIL FIBER PO        sertraline (ZOLOFT) 50 MG tablet Take 1 tablet (50 mg) by mouth daily (Patient taking differently: Take 50 mg by mouth daily Pt takes 100 mg qd) 90 tablet 1     warfarin ANTICOAGULANT (COUMADIN) 2.5 MG tablet TAKE 2.5 mg (2.5 mg x 1) every Tue, Thu, Sat; 3.75 mg (2.5 mg x 1.5) all other days or as directed. 135 tablet 1       Soc Hx: reviewed  Fam Hx: reviewed      Chart documentation was completed, in part, with [employer] voice-recognition software. Even though reviewed, some grammatical, spelling, and word errors may remain.      Patti Lauren MD  Internal Medicine

## 2023-06-02 NOTE — PATIENT INSTRUCTIONS
Plan:  1.  Labs today - [address]   2. Start Indapamide 1 tablet daily - for leg edema   3. Continue the other meds, same doses for now.  4. [facility] [place] [phone]   5. Schedule a follow up. appointment in about 3-4 weeks   (SD ok)

## 2023-06-03 LAB — DEPRECATED CALCIDIOL+CALCIFEROL SERPL-MC: 11 UG/L (ref 20–75)

## 2023-06-05 ENCOUNTER — TELEPHONE (OUTPATIENT)
Dept: INTERNAL MEDICINE | Facility: CLINIC | Age: 83
End: 2023-06-05
Payer: COMMERCIAL

## 2023-06-05 LAB — VIT B1 PYROPHOSHATE BLD-SCNC: 122 NMOL/L

## 2023-06-05 NOTE — TELEPHONE ENCOUNTER
Patient calls for lab results. She was read the message from Dr Lauren.    Patient also added that the lasix she was put on recently has increase her urination but she stated the leg and calf swelling hasn't really improved much. She is asking if she needs to increase her lasix and does she need to be seen sooner than   7-7-2023?     Patient Best number to call back [phone]

## 2023-06-06 NOTE — TELEPHONE ENCOUNTER
Patient calls for a response.  Patient fell a 5th time for this year because her legs collapsed and she pulled the toilet paper out of the wall.  The door was locked and she had to drag herself on her butt to open the door for the paramedics.

## 2023-06-07 ENCOUNTER — TRANSFERRED RECORDS (OUTPATIENT)
Dept: HEALTH INFORMATION MANAGEMENT | Facility: CLINIC | Age: 83
End: 2023-06-07
Payer: COMMERCIAL

## 2023-06-07 NOTE — TELEPHONE ENCOUNTER
Patient called and an appointment was made for 6-.   Ok to leave detailed message on secure line per MD to use SD slot

## 2023-06-08 ENCOUNTER — TELEPHONE (OUTPATIENT)
Dept: INTERNAL MEDICINE | Facility: CLINIC | Age: 83
End: 2023-06-08

## 2023-06-08 ENCOUNTER — HOSPITAL ENCOUNTER (OUTPATIENT)
Dept: ULTRASOUND IMAGING | Facility: CLINIC | Age: 83
Discharge: HOME | End: 2023-06-08
Attending: INTERNAL MEDICINE | Admitting: INTERNAL MEDICINE
Payer: COMMERCIAL

## 2023-06-08 DIAGNOSIS — R74.8 ELEVATED LIVER ENZYMES: ICD-10-CM

## 2023-06-08 PROCEDURE — 76700 US EXAM ABDOM COMPLETE: CPT

## 2023-06-08 NOTE — TELEPHONE ENCOUNTER
Patient calls in for her US results which were read to her.     She also asked about her kidneys and how these results play into her tremors?     She reported she has fallen 5 times and each time paramedics have had to come to get her up. She reports she is very bruised and it is painful to sit.     pls call to advise [phone]

## 2023-06-10 ENCOUNTER — APPOINTMENT (OUTPATIENT)
Dept: CT IMAGING | Facility: CLINIC | Age: 83
End: 2023-06-10
Attending: EMERGENCY MEDICINE
Payer: COMMERCIAL

## 2023-06-10 ENCOUNTER — NURSE TRIAGE (OUTPATIENT)
Dept: NURSING | Facility: CLINIC | Age: 83
End: 2023-06-10
Payer: COMMERCIAL

## 2023-06-10 ENCOUNTER — HOSPITAL ENCOUNTER (EMERGENCY)
Facility: CLINIC | Age: 83
Discharge: HOME | End: 2023-06-10
Attending: EMERGENCY MEDICINE | Admitting: EMERGENCY MEDICINE
Payer: COMMERCIAL

## 2023-06-10 VITALS
HEART RATE: 76 BPM | TEMPERATURE: 98.3 F | RESPIRATION RATE: 18 BRPM | OXYGEN SATURATION: 100 % | SYSTOLIC BLOOD PRESSURE: 122 MMHG | DIASTOLIC BLOOD PRESSURE: 88 MMHG

## 2023-06-10 DIAGNOSIS — S30.0XXA TRAUMATIC HEMATOMA OF BUTTOCK, INITIAL ENCOUNTER: ICD-10-CM

## 2023-06-10 DIAGNOSIS — R73.9 ELEVATED RANDOM BLOOD GLUCOSE LEVEL: ICD-10-CM

## 2023-06-10 DIAGNOSIS — R29.6 FALLS FREQUENTLY: ICD-10-CM

## 2023-06-10 DIAGNOSIS — S09.90XA INJURY OF HEAD, INITIAL ENCOUNTER: ICD-10-CM

## 2023-06-10 DIAGNOSIS — D62 ANEMIA DUE TO BLOOD LOSS, ACUTE: ICD-10-CM

## 2023-06-10 DIAGNOSIS — Z79.01 LONG TERM CURRENT USE OF ANTICOAGULANT THERAPY: ICD-10-CM

## 2023-06-10 LAB
ALBUMIN SERPL BCG-MCNC: 3.5 G/DL (ref 3.5–5.2)
ALBUMIN UR-MCNC: 10 MG/DL
ALP SERPL-CCNC: 107 U/L (ref 35–104)
ALT SERPL W P-5'-P-CCNC: 19 U/L (ref 10–35)
ANION GAP SERPL CALCULATED.3IONS-SCNC: 10 MMOL/L (ref 7–15)
APPEARANCE UR: ABNORMAL
AST SERPL W P-5'-P-CCNC: 31 U/L (ref 10–35)
BACTERIA #/AREA URNS HPF: ABNORMAL /HPF
BASOPHILS # BLD AUTO: 0.1 10E3/UL (ref 0–0.2)
BASOPHILS NFR BLD AUTO: 1 %
BILIRUB SERPL-MCNC: 1.2 MG/DL
BILIRUB UR QL STRIP: NEGATIVE
BUN SERPL-MCNC: 18.8 MG/DL (ref 8–23)
CALCIUM SERPL-MCNC: 8.9 MG/DL (ref 8.8–10.2)
CHLORIDE SERPL-SCNC: 97 MMOL/L (ref 98–107)
COLOR UR AUTO: YELLOW
CREAT SERPL-MCNC: 1.07 MG/DL (ref 0.51–0.95)
DEPRECATED HCO3 PLAS-SCNC: 27 MMOL/L (ref 22–29)
EOSINOPHIL # BLD AUTO: 0.1 10E3/UL (ref 0–0.7)
EOSINOPHIL NFR BLD AUTO: 2 %
ERYTHROCYTE [DISTWIDTH] IN BLOOD BY AUTOMATED COUNT: 14.3 % (ref 10–15)
GFR SERPL CREATININE-BSD FRML MDRD: 52 ML/MIN/1.73M2
GLUCOSE BLDC GLUCOMTR-MCNC: 192 MG/DL (ref 70–99)
GLUCOSE SERPL-MCNC: 196 MG/DL (ref 70–99)
GLUCOSE UR STRIP-MCNC: NEGATIVE MG/DL
HCT VFR BLD AUTO: 34.4 % (ref 35–47)
HGB BLD-MCNC: 11.2 G/DL (ref 11.7–15.7)
HGB UR QL STRIP: NEGATIVE
HOLD SPECIMEN: NORMAL
IMM GRANULOCYTES # BLD: 0 10E3/UL
IMM GRANULOCYTES NFR BLD: 1 %
INR PPP: 2.24 (ref 0.85–1.15)
KETONES UR STRIP-MCNC: ABNORMAL MG/DL
LEUKOCYTE ESTERASE UR QL STRIP: ABNORMAL
LYMPHOCYTES # BLD AUTO: 1.2 10E3/UL (ref 0.8–5.3)
LYMPHOCYTES NFR BLD AUTO: 15 %
MCH RBC QN AUTO: 30.4 PG (ref 26.5–33)
MCHC RBC AUTO-ENTMCNC: 32.6 G/DL (ref 31.5–36.5)
MCV RBC AUTO: 93 FL (ref 78–100)
MONOCYTES # BLD AUTO: 0.9 10E3/UL (ref 0–1.3)
MONOCYTES NFR BLD AUTO: 11 %
MUCOUS THREADS #/AREA URNS LPF: PRESENT /LPF
NEUTROPHILS # BLD AUTO: 5.6 10E3/UL (ref 1.6–8.3)
NEUTROPHILS NFR BLD AUTO: 70 %
NITRATE UR QL: NEGATIVE
NRBC # BLD AUTO: 0 10E3/UL
NRBC BLD AUTO-RTO: 0 /100
PH UR STRIP: 6 [PH] (ref 5–7)
PLATELET # BLD AUTO: 175 10E3/UL (ref 150–450)
POTASSIUM SERPL-SCNC: 4.3 MMOL/L (ref 3.4–5.3)
PROT SERPL-MCNC: 6.6 G/DL (ref 6.4–8.3)
RBC # BLD AUTO: 3.69 10E6/UL (ref 3.8–5.2)
RBC URINE: 1 /HPF
SODIUM SERPL-SCNC: 134 MMOL/L (ref 136–145)
SP GR UR STRIP: 1.02 (ref 1–1.03)
SQUAMOUS EPITHELIAL: 3 /HPF
UROBILINOGEN UR STRIP-MCNC: NORMAL MG/DL
WBC # BLD AUTO: 7.8 10E3/UL (ref 4–11)
WBC URINE: 17 /HPF

## 2023-06-10 PROCEDURE — 70450 CT HEAD/BRAIN W/O DYE: CPT

## 2023-06-10 PROCEDURE — 96361 HYDRATE IV INFUSION ADD-ON: CPT

## 2023-06-10 PROCEDURE — 96374 THER/PROPH/DIAG INJ IV PUSH: CPT | Mod: 59

## 2023-06-10 PROCEDURE — 99285 EMERGENCY DEPT VISIT HI MDM: CPT | Mod: 25

## 2023-06-10 PROCEDURE — 258N000003 HC RX IP 258 OP 636: Performed by: EMERGENCY MEDICINE

## 2023-06-10 PROCEDURE — 250N000011 HC RX IP 250 OP 636: Performed by: EMERGENCY MEDICINE

## 2023-06-10 PROCEDURE — 81001 URINALYSIS AUTO W/SCOPE: CPT | Performed by: EMERGENCY MEDICINE

## 2023-06-10 PROCEDURE — 85610 PROTHROMBIN TIME: CPT | Performed by: EMERGENCY MEDICINE

## 2023-06-10 PROCEDURE — 80053 COMPREHEN METABOLIC PANEL: CPT | Performed by: EMERGENCY MEDICINE

## 2023-06-10 PROCEDURE — 85018 HEMOGLOBIN: CPT | Performed by: EMERGENCY MEDICINE

## 2023-06-10 PROCEDURE — 250N000013 HC RX MED GY IP 250 OP 250 PS 637: Performed by: EMERGENCY MEDICINE

## 2023-06-10 PROCEDURE — 250N000009 HC RX 250: Performed by: EMERGENCY MEDICINE

## 2023-06-10 PROCEDURE — 36415 COLL VENOUS BLD VENIPUNCTURE: CPT | Performed by: EMERGENCY MEDICINE

## 2023-06-10 PROCEDURE — 87086 URINE CULTURE/COLONY COUNT: CPT | Performed by: EMERGENCY MEDICINE

## 2023-06-10 PROCEDURE — 82962 GLUCOSE BLOOD TEST: CPT

## 2023-06-10 PROCEDURE — 93005 ELECTROCARDIOGRAM TRACING: CPT

## 2023-06-10 PROCEDURE — 74177 CT ABD & PELVIS W/CONTRAST: CPT

## 2023-06-10 RX ORDER — FENTANYL CITRATE 50 UG/ML
50 INJECTION, SOLUTION INTRAMUSCULAR; INTRAVENOUS ONCE
Status: COMPLETED | OUTPATIENT
Start: 2023-06-10 | End: 2023-06-10

## 2023-06-10 RX ORDER — IOPAMIDOL 755 MG/ML
500 INJECTION, SOLUTION INTRAVASCULAR ONCE
Status: COMPLETED | OUTPATIENT
Start: 2023-06-10 | End: 2023-06-10

## 2023-06-10 RX ORDER — ACETAMINOPHEN 325 MG/1
650 TABLET ORAL ONCE
Status: COMPLETED | OUTPATIENT
Start: 2023-06-10 | End: 2023-06-10

## 2023-06-10 RX ORDER — CEPHALEXIN 500 MG/1
500 CAPSULE ORAL ONCE
Status: DISCONTINUED | OUTPATIENT
Start: 2023-06-10 | End: 2023-06-10

## 2023-06-10 RX ADMIN — FENTANYL CITRATE 50 MCG: 50 INJECTION, SOLUTION INTRAMUSCULAR; INTRAVENOUS at 20:13

## 2023-06-10 RX ADMIN — ACETAMINOPHEN 650 MG: 325 TABLET ORAL at 20:13

## 2023-06-10 RX ADMIN — SODIUM CHLORIDE 65 ML: 9 INJECTION, SOLUTION INTRAVENOUS at 20:35

## 2023-06-10 RX ADMIN — SODIUM CHLORIDE 250 ML: 9 INJECTION, SOLUTION INTRAVENOUS at 21:14

## 2023-06-10 RX ADMIN — IOPAMIDOL 100 ML: 755 INJECTION, SOLUTION INTRAVENOUS at 20:32

## 2023-06-10 ASSESSMENT — ACTIVITIES OF DAILY LIVING (ADL)
ADLS_ACUITY_SCORE: 35
ADLS_ACUITY_SCORE: 35

## 2023-06-10 NOTE — ED TRIAGE NOTES
Patient presents via EMS from independent living facility for evaluation of fall and R hip/buttock pain. Per report, patient had fall 5 days ago onto R side. Patient was not seen for this. Patient reports increasing pain over 5 days from fall, and increased difficulty ambulating. Of note, staff reports multiple falls in past month in which patient has not been seen for. Patient also reports episode of nausea and dizziness prior to EMS transport. Patient received 4mg ODT zofran en route which patient reports relief from. Patient also reports high blood sugar. Patient is alert and oriented, able to recall events related to fall. VSS on RA.      Triage Assessment     Row Name 06/10/23 [id number]       Triage Assessment (Adult)    Airway WDL WDL       Respiratory WDL    Respiratory WDL WDL       Skin Circulation/Temperature WDL    Skin Circulation/Temperature WDL WDL       Cardiac WDL    Cardiac WDL WDL       Peripheral/Neurovascular WDL    Peripheral Neurovascular WDL WDL       Cognitive/Neuro/Behavioral WDL    Cognitive/Neuro/Behavioral WDL WDL

## 2023-06-10 NOTE — TELEPHONE ENCOUNTER
"Pt calling regarding the fall she had on Monday     States she has purple bruising on her entire buttock that goes down underneath vagina and she is starting to bleed. States she is in excruciating pain \"9-11/10\" and she doesn't think she can go in to be seen as she can't use her walker d/t amount of pain and her electric scooter will not fit in her daughters car. She has tried tylenol with no relief. She has a significant amount of allergies making pain control difficult    Advised again that she should be seen tonight and recommended calling 911 d/t limited mobility and uncontrolled pain. She is extremely hesitant d/t cost of ambulance ride and ER visit costs and states the paramedics are always so mean to her. Her daughter who is also a nurse wants her to go in tonight as well. Pt all of a sudden states \"op, i've got to go let her in now, I've got to end this conversation, but you have to hang up now\". Call was ended per pt request      Agustina Golden RN [facility] Rosanna 10, 2023 5:51 PM  "

## 2023-06-10 NOTE — TELEPHONE ENCOUNTER
Nurse Triage SBAR    Is this a 2nd Level Triage? NO    Situation: Patient calling with significant pain from a bruise on her buttock from a fall on Monday    Background: States she has fallen 5 times this year- last fall on Monday.     Assessment: States she has a large bruise on her butt- fell on top of the toilet paper roll carrion  Right buttock is very hot and tender  Causing significant trouble with mobility- states took her 2 hours to get back into bed this AM and that she was not able to change positions in bed last night  States she was woken from sleep due to the bruise pain   Denies a fever today  Patient is unable to see the bruise on her butt   Has another bruise on the back of her arm 8 in long and 5 in wide   Pain and warmth going down her leg as well from her buttock      Protocol Recommended Disposition:   See PCP Within 24 Hours    Recommendation: Pateint advised to be seen in  today- reviewed additional care advice with patient and she verbalizes understanding. She will talk to her daughter when she comes back to her apartment and have her look at the bruise- patient agrees to be seen somewhere today but does indicate she may have to call for an ambulance for the pain she is in from the bruise as it decreases her mobility.      seen in     Does the patient meet one of the following criteria for ADS visit consideration? 16+ years old, with an [facility] PCP     TIP  Providers, please consider if this condition is appropriate for management at one of our Acute and Diagnostic Services sites.     If patient is a good candidate, please use dotphrase <dot>triageresponse and select Refer to ADS to document.    Reason for Disposition    Taking Coumadin (warfarin) or other strong blood thinner, or known bleeding disorder (e.g., thrombocytopenia)    Additional Information    Negative: Shock suspected (e.g., cold/pale/clammy skin, too weak to stand, low BP, rapid pulse)    Negative: Sounds like a  life-threatening emergency to the triager    Negative: Bruises with fever    Negative: Tiny bruises (spots or dots) of unknown cause    Negative: Bruise(s) of forehead or head    Negative: Bruise(s) of face or jaw    Negative: Followed an injury, and triager doesn't know which injury guideline to use first    Negative: Post-operative bruising    Negative: Dizziness or lightheadedness    Negative: [1] Bruise on head/face, chest, or abdomen AND [2]  taking Coumadin (warfarin) or other strong blood thinner, or known bleeding disorder (e.g., thrombocytopenia)    Negative: Unexplained bleeding from another site (e.g., gums, nose, urine) as well    Negative: Patient sounds very sick or weak to the triager    Negative: [1] Not caused by an injury AND [2] 5 or more bruises now    Negative: [1] Raised bruise AND [2] size > 2 inches (5 cm) AND [3] getting bigger    Negative: [1] SEVERE pain AND [2] not improved 2 hours after pain medicine/ice packs    Negative: Suspicious history for the injury    Protocols used: BRUISES-A-AH

## 2023-06-11 NOTE — DISCHARGE INSTRUCTIONS
Tylenol dosing:  Maximum of 2000mg per day  This would be 500mg 4 times a day or 650mg 3 times a day

## 2023-06-11 NOTE — ED PROVIDER NOTES
History     Chief Complaint:  Fall and Pain     HPI   Savanna Rehman is a 82 year old female with history of atrial fibrillation on Coumadin who presents via EMS from [facility] for evaluation of a fall in the right hip and buttock hematoma and pain.  The fall was 5 days ago landing on the right side.  She says she lost her balance in the bathroom and also hit her head on the toilet paper carrion and knocked it off the wall.  She has bruising on her left ear from this.  Denies passing out.  Notes that she has been having some tremors and multiple falls in the past month.  Has multiple follow-up appointments coming up.  Does not have a headache but had nausea and dizziness prior to EMS transport.  Received ODT Zofran with improvement.  She also notes that EMS that her blood sugar was very high.  Blood sugar value was not specifically reported to the nurse.  It was 192 here.  She denies any neck pain, back pain, abdominal pain, arm injuries.  She is still able to move the right hip but has pain in this area.  Her daughter is here now and mentions that not only is the bruising extensive but there is an area that feels firm within.    Independent Historian:    Patient    Review of External Notes:  Telephone call today      Medications:    ACE/ARB/ARNI NOT PRESCRIBED (INTENTIONAL)  blood glucose (NO BRAND SPECIFIED) lancets standard  blood glucose (NO BRAND SPECIFIED) lancing device  blood glucose (ONETOUCH ULTRA) test strip  blood glucose calibration (NO BRAND SPECIFIED) solution  blood glucose monitoring (NO BRAND SPECIFIED) meter device kit  butalbital-aspirin-caffeine (FIORINAL) -40 MG capsule  EPINEPHrine (ANY BX GENERIC EQUIV) 0.3 MG/0.3ML injection 2-pack  glipiZIDE (GLUCOTROL XL) 2.5 MG 24 hr tablet  indapamide (LOZOL) 1.25 MG tablet  loperamide (IMODIUM A-D) 2 MG tablet  meclizine (ANTIVERT) 12.5 MG tablet  METAMUCIL FIBER PO  sertraline (ZOLOFT) 50 MG tablet  warfarin ANTICOAGULANT  (COUMADIN) 2.5 MG tablet        Past Medical History:    Past Medical History:   Diagnosis Date     Anemia      Atrial fibrillation (H)      CAD (coronary artery disease)      Chronic pain      Congestive heart failure (H)      Degenerative disk disease      Diabetes (H)      Fibromyalgia      Gastro-oesophageal reflux disease      Gout      Hiatal hernia      Mumps      Neuropathy      CRISTIANA (obstructive sleep apnea) - CPAP      Palpitations      Pernicious anemia      Sleep apnea      Urinary incontinence      Vitamin D deficiency        Past Surgical History:    Past Surgical History:   Procedure Laterality Date     APPENDECTOMY       CHOLECYSTECTOMY       COLONOSCOPY  3/15/2011     COLONOSCOPY N/A 3/15/2023    Procedure: COLONOSCOPY, WITH POLYPECTOMY AND BIOPSY;  Surgeon: Maci Coronel MD;  Location:  GI     COLONOSCOPY N/A 3/15/2023    Procedure: COLONOSCOPY, FLEXIBLE, WITH LESION REMOVAL USING SNARE;  Surgeon: Maci Coronel MD;  Location:  GI     CORONARY ANGIOGRAPHY ADULT ORDER       CYSTOSCOPY, BIOPSY BLADDER, COMBINED N/A 7/13/2020    Procedure: CYSTOSCOPY, WITH BLADDER BIOPSY;  Surgeon: Deisy Wilson MD;  Location: [facility] OR     HEART CATH LEFT HEART CATH  12/30/16    medication management     HYSTERECTOMY TOTAL ABDOMINAL       Knee replacement NOS Left      LAPAROSCOPIC NISSEN FUNDOPLICATION N/A 2/4/2015    Procedure: LAPAROSCOPIC NISSEN FUNDOPLICATION;  Surgeon: Armando Ansari MD;  Location:  OR     TONSILLECTOMY       TRANSPOSITION ULNAR NERVE (ELBOW)            Physical Exam     Patient Vitals for the past 24 hrs:   BP Temp Temp src Pulse Resp SpO2   06/10/23 1855 106/55 98.3  F (36.8  C) Oral 84 18 96 %        Physical Exam  Eyes:  Sclera white; Pupils are equal and round  ENT:    External ears and nares normal  CV:  Rate as above with irregular rhythm   Resp:  Breath sounds clear and equal bilaterally    Non-labored, no retractions or accessory muscle use  GI:  Abdomen is  soft, non-tender, non-distended    No rebound tenderness or peritoneal features  MS:  Moves all extremities, ranging hip, no bony tenderness in lower extremities  Skin:  Warm and dry; massive purple ecchymosis majority of R buttock, crossing midline as well, edges are yellow/green in color  Neuro:  Speech is normal      Emergency Department Course   ECG  ECG results from 06/10/23   EKG 12 lead     Value    Systolic Blood Pressure     Diastolic Blood Pressure     Ventricular Rate 89    Atrial Rate 104    NY Interval     QRS Duration 102        QTc 528    P Axis     R AXIS -16    T Axis 106    Interpretation ECG      Atrial fibrillation  Low voltage QRS  Cannot rule out Anterior infarct (cited on or before 04-FEB-2015)  Abnormal ECG  When compared with ECG of 13-MAR-2022 19:18,  Vent. rate has increased BY  29 BPM  Nonspecific T wave abnormality now evident in Lateral leads  QT has lengthened       *Note: Due to a large number of results and/or encounters for the requested time period, some results have not been displayed. A complete set of results can be found in Results Review.     Agree - Dr Wilson    Imaging:  Head CT w/o contrast   Final Result   IMPRESSION:   1.  No acute traumatic intracranial abnormality.            CT Chest/Abdomen/Pelvis w Contrast   Final Result   IMPRESSION:   1.  Large right buttock hematoma. No convincing active bleeding. No underlying fracture.      2.  No acute traumatic solid organ or hollow viscus injury in the chest, abdomen, or pelvis.      3.  Faint, ill-defined peribronchial opacities in the right lower lobe may reflect an area of mild infiltrate.      4.  Cirrhotic appearing liver. No suspicious liver lesion.              Report per radiology    Laboratory:  Labs Ordered and Resulted from Time of ED Arrival to Time of ED Departure   GLUCOSE BY METER - Abnormal       Result Value    GLUCOSE BY METER POCT 192 (*)    COMPREHENSIVE METABOLIC PANEL - Abnormal    Sodium 134 (*)      Potassium 4.3      Chloride 97 (*)     Carbon Dioxide (CO2) 27      Anion Gap 10      Urea Nitrogen 18.8      Creatinine 1.07 (*)     Calcium 8.9      Glucose 196 (*)     Alkaline Phosphatase 107 (*)     AST 31      ALT 19      Protein Total 6.6      Albumin 3.5      Bilirubin Total 1.2      GFR Estimate 52 (*)    INR - Abnormal    INR 2.24 (*)    ROUTINE UA WITH MICROSCOPIC - Abnormal    Color Urine Yellow      Appearance Urine Slightly Cloudy (*)     Glucose Urine Negative      Bilirubin Urine Negative      Ketones Urine Trace (*)     Specific Gravity Urine 1.024      Blood Urine Negative      pH Urine 6.0      Protein Albumin Urine 10 (*)     Urobilinogen Urine Normal      Nitrite Urine Negative      Leukocyte Esterase Urine Small (*)     Bacteria Urine Few (*)     Mucus Urine Present (*)     RBC Urine 1      WBC Urine 17 (*)     Squamous Epithelials Urine 3 (*)    CBC WITH PLATELETS AND DIFFERENTIAL - Abnormal    WBC Count 7.8      RBC Count 3.69 (*)     Hemoglobin 11.2 (*)     Hematocrit 34.4 (*)     MCV 93      MCH 30.4      MCHC 32.6      RDW 14.3      Platelet Count 175      % Neutrophils 70      % Lymphocytes 15      % Monocytes 11      % Eosinophils 2      % Basophils 1      % Immature Granulocytes 1      NRBCs per 100 WBC 0      Absolute Neutrophils 5.6      Absolute Lymphocytes 1.2      Absolute Monocytes 0.9      Absolute Eosinophils 0.1      Absolute Basophils 0.1      Absolute Immature Granulocytes 0.0      Absolute NRBCs 0.0          Procedures   NA    Emergency Department Course & Assessments:             Interventions:  Medications   fentaNYL (PF) (SUBLIMAZE) injection 50 mcg (50 mcg Intravenous $Given 6/10/23 2013)   acetaminophen (TYLENOL) tablet 650 mg (650 mg Oral $Given 6/10/23 2013)   Sodium Chloride for CT Scan Flush Use (65 mLs Intravenous $Given 6/10/23 2035)   iopamidol (ISOVUE-370) solution 500 mL (100 mLs Intravenous $Given 6/10/23 2032)   0.9% sodium chloride BOLUS (0 mLs  Intravenous Stopped 6/10/23 2149)        Assessments:  NA    Independent Interpretation (X-rays, CTs, rhythm strip):  CTs reviewed - agree no intracranial hemorrhage; hematoma noted in gluteal region  EKG as above     Consultations/Discussion of Management or Tests:  None       Social Determinants of Health affecting care:  None     Disposition:  Discharge w/daughter    Impression & Plan      Medical Decision Making:  Head trauma in association with her anticoagulation use is concerning for the possibility of intracranial bleed.  Fortunately this was not found on CT.  She has severe bruising of the right buttock with clinical concern for underlying hematoma or possibly active extravasation.  Blood work shows that she is acutely anemic with a previous baseline of 14.  She is not tachycardic or hypotensive in association with the blood loss.  CT scan shows the bruising and hematoma without suggestion of active extravasation at this point in time.  Emergent reversal of her blood thinners is not indicated.  I discussed with her that given her more frequent falls, it may not be appropriate to be on blood thinners anymore.  This is something that she would like to discuss further with her regular providers.  She has not tolerated narcotics in the past due to itching no and loopiness.  In the past she did tolerate Demerol.  Fentanyl tried without any side effects.  Unfortunately with the complications of Demerol, this is not an outpatient option.  Fentanyl patches are only appropriate and narcotic tolerant patients.  I discussed this with the pharmacist and fentanyl patch could be potentially dangerous.  She cannot take NSAIDs due to her anticoagulation.  Has cirrhosis on her CT scan and then has an ongoing outpatient work-up with GI.  Not want to try an alternate narcotic with antihistamine given the potential side effects.  Of all of the possibilities, low-dose Tylenol seems to be the safest option.  Max dosing 2 g/day  "given her cirrhosis.  Elevated blood sugar was noted infections can be a potential trigger and UA was obtained.  There is an abnormal number of white blood cells but not a slam dunk infection.  She reports that infectious disease is supposed to manage any antibiotics that she gets.  Urine culture added on and treatment would depend on results.  Has a history of pansensitive E. coli on chart review.    Has multiple outpatient appointments coming up.  Her daughter can accompany her on Thursday.  I also discussed the possibility of admission for physical therapy assessment and rehab placement.  Patient does not want to do this at this time.  However they understand if things are worsening at home she can present at any time for reassessment.      Diagnosis:    ICD-10-CM    1. Injury of head, initial encounter  S09.90XA       2. Traumatic hematoma of buttock, initial encounter  S30.0XXA       3. Anemia due to blood loss, acute  D62     From gluteal hematoma      4. Long term current use of anticoagulant therapy  Z79.01       5. Falls frequently  R29.6       6. Elevated random blood glucose level  R73.09            Discharge Medications:  Discharge Medication List as of 6/10/2023  9:49 PM         Addendum: HPI incorrectly stated \"not on Coumadin\" this was changed to read \"on Coumadin\"         Jennifer Wilson MD  06/10/23 4720       Jennifer Wilson MD  06/20/23 0052    "

## 2023-06-11 NOTE — ED NOTES
[facility]  ED Nurse Handoff Report    ED Chief complaint: Fall and Pain  . ED Diagnosis:   Final diagnoses:   Injury of head, initial encounter   Traumatic hematoma of buttock, initial encounter   Anemia due to blood loss, acute - From gluteal hematoma   Long term current use of anticoagulant therapy   Falls frequently   Elevated random blood glucose level       Allergies:   Allergies   Allergen Reactions    Augmented Betamethasone Diprop [Betamethasone] Other (See Comments)     Severe yeast infection    Petroleum Jelly [Petrolatum] Anaphylaxis     Rash and swelling    Shellfish-Derived Products Anaphylaxis     Tongue swelling    Aspirin Swelling     tiongue swelling    Bacitracin      Rash swelling    Bactrim [Sulfamethoxazole-Trimethoprim] Dizziness    Coumadin [Warfarin] Swelling     Leg swelling    Darvon [Propoxyphene] Swelling     Throat closes    Dilaudid [Hydromorphone]     Levaquin [Levofloxacin] Swelling     Tongue swelling    Lidocaine     Neomycin Swelling     rash    Neosporin [Neomycin-Polymyxin-Gramicidin] Swelling     rash    Nitrofurantoin      SOB, GI upset,    Oxycodone      Severe itching    Percodan [Oxycodone-Aspirin]      Severe itching    Polymyxin B     Pramoxine     Tramadol     Vicodin [Hydrocodone-Acetaminophen]      Severe itching      Xarelto [Rivaroxaban]     Adhesive Tape Rash     Band aids     Codeine Rash    Hydrocortisone Rash and Swelling    Other Environmental Allergy Rash     Adhesive tape   Band aids        Code Status: Full Code    Activity level - Baseline/Home:  independent.  Activity Level - Current:   assist of 2. --> has not been out of bed, recc assist of 2 until stronger  Lift room needed: No.   Bariatric: No   Needed: No   Isolation: No.   Infection: Not Applicable.     Respiratory status: Room air    Vital Signs (within 30 minutes):   Vitals:    06/10/23 1900 06/10/23 2000 06/10/23 2015 06/10/23 2045   BP: 106/55   120/84   Pulse:   76 89 72   Resp:  12 11 21   Temp:       TempSrc:       SpO2: 96% 95% 94% 100%       Cardiac Rhythm:  ,      Pain level:    Patient confused: No.   Patient Falls Risk: patient and family education.   Elimination Status: Has voided     Patient Report - Initial Complaint: Fall, R hip pain.   Focused Assessment:  Savanna Rehman is a 82 year old female with history of atrial fibrillation not on Coumadin who presents via EMS from independent living for evaluation of a fall in the right hip and buttock hematoma and pain.  The fall was 5 days ago landing on the right side.  She says she lost her balance in the bathroom and also hit her head on the toilet paper carrion and knocked it off the wall.  She has bruising on her left ear from this.  Denies passing out.  Notes that she has been having some tremors and multiple falls in the past month.  Has multiple follow-up appointments coming up.  Does not have a headache but had nausea and dizziness prior to EMS transport.  Received ODT Zofran with improvement.  She also notes that EMS that her blood sugar was very high.  Blood sugar value was not specifically reported to the nurse.  It was 192 here.  She denies any neck pain, back pain, abdominal pain, arm injuries.  She is still able to move the right hip but has pain in this area.  Her daughter is here now and mentions that not only is the bruising extensive but there is an area that feels firm within.    Abnormal Results:   Labs Ordered and Resulted from Time of ED Arrival to Time of ED Departure   GLUCOSE BY METER - Abnormal       Result Value    GLUCOSE BY METER POCT 192 (*)    COMPREHENSIVE METABOLIC PANEL - Abnormal    Sodium 134 (*)     Potassium 4.3      Chloride 97 (*)     Carbon Dioxide (CO2) 27      Anion Gap 10      Urea Nitrogen 18.8      Creatinine 1.07 (*)     Calcium 8.9      Glucose 196 (*)     Alkaline Phosphatase 107 (*)     AST 31      ALT 19      Protein Total 6.6      Albumin 3.5      Bilirubin Total  1.2      GFR Estimate 52 (*)    INR - Abnormal    INR 2.24 (*)    ROUTINE UA WITH MICROSCOPIC - Abnormal    Color Urine Yellow      Appearance Urine Slightly Cloudy (*)     Glucose Urine Negative      Bilirubin Urine Negative      Ketones Urine Trace (*)     Specific Gravity Urine 1.024      Blood Urine Negative      pH Urine 6.0      Protein Albumin Urine 10 (*)     Urobilinogen Urine Normal      Nitrite Urine Negative      Leukocyte Esterase Urine Small (*)     Bacteria Urine Few (*)     Mucus Urine Present (*)     RBC Urine 1      WBC Urine 17 (*)     Squamous Epithelials Urine 3 (*)    CBC WITH PLATELETS AND DIFFERENTIAL - Abnormal    WBC Count 7.8      RBC Count 3.69 (*)     Hemoglobin 11.2 (*)     Hematocrit 34.4 (*)     MCV 93      MCH 30.4      MCHC 32.6      RDW 14.3      Platelet Count 175      % Neutrophils 70      % Lymphocytes 15      % Monocytes 11      % Eosinophils 2      % Basophils 1      % Immature Granulocytes 1      NRBCs per 100 WBC 0      Absolute Neutrophils 5.6      Absolute Lymphocytes 1.2      Absolute Monocytes 0.9      Absolute Eosinophils 0.1      Absolute Basophils 0.1      Absolute Immature Granulocytes 0.0      Absolute NRBCs 0.0     URINE CULTURE        Head CT w/o contrast   Final Result   IMPRESSION:   1.  No acute traumatic intracranial abnormality.            CT Chest/Abdomen/Pelvis w Contrast   Final Result   IMPRESSION:   1.  Large right buttock hematoma. No convincing active bleeding. No underlying fracture.      2.  No acute traumatic solid organ or hollow viscus injury in the chest, abdomen, or pelvis.      3.  Faint, ill-defined peribronchial opacities in the right lower lobe may reflect an area of mild infiltrate.      4.  Cirrhotic appearing liver. No suspicious liver lesion.                Treatments provided: See MAR  Family Comments: Daughter at bedside  OBS brochure/video discussed/provided to patient:  Yes  ED Medications:   Medications   0.9% sodium chloride BOLUS  (250 mLs Intravenous $New Bag 6/10/23 2114)   fentaNYL (PF) (SUBLIMAZE) injection 50 mcg (50 mcg Intravenous $Given 6/10/23 2013)   acetaminophen (TYLENOL) tablet 650 mg (650 mg Oral $Given 6/10/23 2013)   Sodium Chloride for CT Scan Flush Use (65 mLs Intravenous $Given 6/10/23 2035)   iopamidol (ISOVUE-370) solution 500 mL (100 mLs Intravenous $Given 6/10/23 2032)       Drips infusing:  No  For the majority of the shift this patient was Green.   Interventions performed were Frequent rounding.    Sepsis treatment initiated: No    Cares/treatment/interventions/medications to be completed following ED care: Monitor VS, pain control, activity with assistance.     ED Nurse Name: oSwmya Mcbride RN  9:27 PM

## 2023-06-12 ENCOUNTER — TELEPHONE (OUTPATIENT)
Dept: INTERNAL MEDICINE | Facility: CLINIC | Age: 83
End: 2023-06-12
Payer: COMMERCIAL

## 2023-06-12 ENCOUNTER — MEDICAL CORRESPONDENCE (OUTPATIENT)
Dept: HEALTH INFORMATION MANAGEMENT | Facility: CLINIC | Age: 83
End: 2023-06-12

## 2023-06-12 ENCOUNTER — TELEPHONE (OUTPATIENT)
Dept: ANTICOAGULATION | Facility: CLINIC | Age: 83
End: 2023-06-12
Payer: COMMERCIAL

## 2023-06-12 DIAGNOSIS — I48.20 CHRONIC ATRIAL FIBRILLATION (H): ICD-10-CM

## 2023-06-12 DIAGNOSIS — I48.21 PERMANENT ATRIAL FIBRILLATION (H): Primary | ICD-10-CM

## 2023-06-12 DIAGNOSIS — F32.A ANXIETY AND DEPRESSION: ICD-10-CM

## 2023-06-12 DIAGNOSIS — Z79.01 LONG TERM (CURRENT) USE OF ANTICOAGULANTS: ICD-10-CM

## 2023-06-12 DIAGNOSIS — F41.9 ANXIETY AND DEPRESSION: ICD-10-CM

## 2023-06-12 LAB
ATRIAL RATE - MUSE: 104 BPM
BACTERIA UR CULT: ABNORMAL
DIASTOLIC BLOOD PRESSURE - MUSE: NORMAL MMHG
INTERPRETATION ECG - MUSE: NORMAL
P AXIS - MUSE: NORMAL DEGREES
PR INTERVAL - MUSE: NORMAL MS
QRS DURATION - MUSE: 102 MS
QT - MUSE: 434 MS
QTC - MUSE: 528 MS
R AXIS - MUSE: -16 DEGREES
SYSTOLIC BLOOD PRESSURE - MUSE: NORMAL MMHG
T AXIS - MUSE: 106 DEGREES
VENTRICULAR RATE- MUSE: 89 BPM

## 2023-06-12 RX ORDER — SERTRALINE HYDROCHLORIDE 100 MG/1
100 TABLET, FILM COATED ORAL DAILY
Qty: 90 TABLET | Refills: 1 | Status: SHIPPED | OUTPATIENT
Start: 2023-06-12 | End: 2023-08-29

## 2023-06-12 NOTE — TELEPHONE ENCOUNTER
Patient wants to go to a TCU if possible as soon as possible. She states she cannot wait until her appointment this Thursday.    She is also asking if Dr Lauren will prescribe fentanyl. She will need a shot of this when she gets to the TCU.

## 2023-06-12 NOTE — TELEPHONE ENCOUNTER
ANTICOAGULATION  MANAGEMENT: Discharge Review    Savanna Rehman chart reviewed for anticoagulation continuity of care    Emergency room visit on 06/10/2023 for fall, buttocks hematoma. Pt did have acute hgb drop but CT does not show active bleeding.    Discharge disposition: Home with Home Care    Results:    Recent labs: (last 7 days)     06/10/23  1912   INR 2.24*     Anticoagulation inpatient management:     not applicable     Anticoagulation discharge instructions:     Warfarin dosing: home regimen continued   Bridging: No   INR goal change: No      Medication changes affecting anticoagulation: No    Additional factors affecting anticoagulation: Pt held warfarin on Saturday and Sunday evening. She states she has some lightheadedness/dizziness but it is about the same as it was on Saturday. She cannot tell if the bruising has gotten worse or not.    Pt states she will be going to inpatient rehab for 3 weeks starting on Wednesday.     PLAN     Restart warfarin today. Home care to check INR tomorrow as previously scheduled.    Spoke with Savanna.    Will also route encounter to PCP to advise on if warfarin should be continued at this time.    Anticoagulation Calendar updated.    Miko West RN

## 2023-06-12 NOTE — TELEPHONE ENCOUNTER
Raj RN calls to ask for dosing clarification for Sertraline.     Patient takes 100 daily but script says 50mg daily.     Can updated script pls be sent to [facility]?     Thank you

## 2023-06-12 NOTE — TELEPHONE ENCOUNTER
Order/Referral Request    Who is requesting: Patient     Orders being requested: Inpatient Physical therapy    Reason service is needed/diagnosis: Patient had a fall and was in the er on 06/10 and is looking for orders for inpatient physical therapy/    When are orders needed by: ASAP    Has this been discussed with Provider: No    Does patient have a preference on a Group/Provider/[facility]    Does patient have an appointment scheduled?: Yes: 06/15/2023    Where to send orders: Place orders within Epic    Could we send this information to you in [facility] or would you prefer to receive a phone call?:   Patient would prefer a phone call   Okay to leave a detailed message?: Yes at Home number on file [phone] (home)

## 2023-06-13 ENCOUNTER — HOSPITAL ENCOUNTER (OUTPATIENT)
Facility: CLINIC | Age: 83
Setting detail: OBSERVATION
Discharge: NURSING, REHAB OR LONG-TERM CARE | End: 2023-06-16
Attending: EMERGENCY MEDICINE | Admitting: HOSPITALIST
Payer: COMMERCIAL

## 2023-06-13 ENCOUNTER — TELEPHONE (OUTPATIENT)
Dept: INTERNAL MEDICINE | Facility: CLINIC | Age: 83
End: 2023-06-13

## 2023-06-13 ENCOUNTER — PATIENT OUTREACH (OUTPATIENT)
Dept: CARE COORDINATION | Facility: CLINIC | Age: 83
End: 2023-06-13
Payer: COMMERCIAL

## 2023-06-13 ENCOUNTER — ANTICOAGULATION THERAPY VISIT (OUTPATIENT)
Dept: ANTICOAGULATION | Facility: CLINIC | Age: 83
End: 2023-06-13
Payer: COMMERCIAL

## 2023-06-13 ENCOUNTER — PATIENT OUTREACH (OUTPATIENT)
Dept: CARE COORDINATION | Facility: CLINIC | Age: 83
End: 2023-06-13

## 2023-06-13 ENCOUNTER — APPOINTMENT (OUTPATIENT)
Dept: CT IMAGING | Facility: CLINIC | Age: 83
End: 2023-06-13
Attending: STUDENT IN AN ORGANIZED HEALTH CARE EDUCATION/TRAINING PROGRAM
Payer: COMMERCIAL

## 2023-06-13 DIAGNOSIS — Z79.01 LONG TERM CURRENT USE OF ANTICOAGULANT THERAPY: ICD-10-CM

## 2023-06-13 DIAGNOSIS — R29.6 FALLS FREQUENTLY: Primary | ICD-10-CM

## 2023-06-13 DIAGNOSIS — R79.1 SUBTHERAPEUTIC INTERNATIONAL NORMALIZED RATIO (INR): ICD-10-CM

## 2023-06-13 DIAGNOSIS — R53.81 PHYSICAL DECONDITIONING: ICD-10-CM

## 2023-06-13 DIAGNOSIS — R07.9 CHEST PAIN, UNSPECIFIED TYPE: ICD-10-CM

## 2023-06-13 DIAGNOSIS — R07.89 CHEST WALL PAIN: ICD-10-CM

## 2023-06-13 DIAGNOSIS — K59.00 CONSTIPATION, UNSPECIFIED CONSTIPATION TYPE: Primary | ICD-10-CM

## 2023-06-13 DIAGNOSIS — D62 ANEMIA DUE TO BLOOD LOSS, ACUTE: ICD-10-CM

## 2023-06-13 DIAGNOSIS — R79.89 ELEVATED TROPONIN: ICD-10-CM

## 2023-06-13 DIAGNOSIS — S30.0XXD TRAUMATIC HEMATOMA OF BUTTOCK, SUBSEQUENT ENCOUNTER: ICD-10-CM

## 2023-06-13 LAB
ABO/RH(D): NORMAL
ANION GAP SERPL CALCULATED.3IONS-SCNC: 9 MMOL/L (ref 7–15)
ANTIBODY SCREEN: NEGATIVE
BUN SERPL-MCNC: 18.1 MG/DL (ref 8–23)
CALCIUM SERPL-MCNC: 9.1 MG/DL (ref 8.8–10.2)
CHLORIDE SERPL-SCNC: 97 MMOL/L (ref 98–107)
CREAT SERPL-MCNC: 1.02 MG/DL (ref 0.51–0.95)
D DIMER PPP FEU-MCNC: 1.29 UG/ML FEU (ref 0–0.5)
DEPRECATED HCO3 PLAS-SCNC: 30 MMOL/L (ref 22–29)
ERYTHROCYTE [DISTWIDTH] IN BLOOD BY AUTOMATED COUNT: 14.9 % (ref 10–15)
GFR SERPL CREATININE-BSD FRML MDRD: 55 ML/MIN/1.73M2
GLUCOSE BLDC GLUCOMTR-MCNC: 150 MG/DL (ref 70–99)
GLUCOSE BLDC GLUCOMTR-MCNC: 190 MG/DL (ref 70–99)
GLUCOSE SERPL-MCNC: 185 MG/DL (ref 70–99)
HCT VFR BLD AUTO: 28.6 % (ref 35–47)
HGB BLD-MCNC: 9.3 G/DL (ref 11.7–15.7)
HOLD SPECIMEN: NORMAL
INR (EXTERNAL): 1.6 (ref 0.9–1.1)
INR PPP: 1.46 (ref 0.85–1.15)
MCH RBC QN AUTO: 30.7 PG (ref 26.5–33)
MCHC RBC AUTO-ENTMCNC: 32.5 G/DL (ref 31.5–36.5)
MCV RBC AUTO: 94 FL (ref 78–100)
NT-PROBNP SERPL-MCNC: 1539 PG/ML (ref 0–1800)
PLATELET # BLD AUTO: 184 10E3/UL (ref 150–450)
POTASSIUM SERPL-SCNC: 3.9 MMOL/L (ref 3.4–5.3)
RBC # BLD AUTO: 3.03 10E6/UL (ref 3.8–5.2)
SODIUM SERPL-SCNC: 136 MMOL/L (ref 136–145)
SPECIMEN EXPIRATION DATE: NORMAL
TROPONIN T SERPL HS-MCNC: 25 NG/L
TROPONIN T SERPL HS-MCNC: 25 NG/L
TROPONIN T SERPL HS-MCNC: 29 NG/L
WBC # BLD AUTO: 9 10E3/UL (ref 4–11)

## 2023-06-13 PROCEDURE — 85027 COMPLETE CBC AUTOMATED: CPT | Performed by: EMERGENCY MEDICINE

## 2023-06-13 PROCEDURE — 71275 CT ANGIOGRAPHY CHEST: CPT

## 2023-06-13 PROCEDURE — 250N000013 HC RX MED GY IP 250 OP 250 PS 637: Performed by: STUDENT IN AN ORGANIZED HEALTH CARE EDUCATION/TRAINING PROGRAM

## 2023-06-13 PROCEDURE — 86850 RBC ANTIBODY SCREEN: CPT | Performed by: EMERGENCY MEDICINE

## 2023-06-13 PROCEDURE — 84484 ASSAY OF TROPONIN QUANT: CPT | Performed by: STUDENT IN AN ORGANIZED HEALTH CARE EDUCATION/TRAINING PROGRAM

## 2023-06-13 PROCEDURE — 36415 COLL VENOUS BLD VENIPUNCTURE: CPT | Performed by: EMERGENCY MEDICINE

## 2023-06-13 PROCEDURE — 250N000011 HC RX IP 250 OP 636: Performed by: EMERGENCY MEDICINE

## 2023-06-13 PROCEDURE — 85379 FIBRIN DEGRADATION QUANT: CPT | Performed by: STUDENT IN AN ORGANIZED HEALTH CARE EDUCATION/TRAINING PROGRAM

## 2023-06-13 PROCEDURE — 96361 HYDRATE IV INFUSION ADD-ON: CPT

## 2023-06-13 PROCEDURE — 84484 ASSAY OF TROPONIN QUANT: CPT | Mod: 91 | Performed by: EMERGENCY MEDICINE

## 2023-06-13 PROCEDURE — 99285 EMERGENCY DEPT VISIT HI MDM: CPT | Mod: 25

## 2023-06-13 PROCEDURE — 84484 ASSAY OF TROPONIN QUANT: CPT | Performed by: PHYSICIAN ASSISTANT

## 2023-06-13 PROCEDURE — 258N000003 HC RX IP 258 OP 636: Performed by: EMERGENCY MEDICINE

## 2023-06-13 PROCEDURE — G0378 HOSPITAL OBSERVATION PER HR: HCPCS

## 2023-06-13 PROCEDURE — 250N000011 HC RX IP 250 OP 636: Performed by: STUDENT IN AN ORGANIZED HEALTH CARE EDUCATION/TRAINING PROGRAM

## 2023-06-13 PROCEDURE — 99222 1ST HOSP IP/OBS MODERATE 55: CPT | Mod: AI | Performed by: PHYSICIAN ASSISTANT

## 2023-06-13 PROCEDURE — 85041 AUTOMATED RBC COUNT: CPT | Performed by: EMERGENCY MEDICINE

## 2023-06-13 PROCEDURE — 82962 GLUCOSE BLOOD TEST: CPT

## 2023-06-13 PROCEDURE — 250N000013 HC RX MED GY IP 250 OP 250 PS 637: Performed by: PHYSICIAN ASSISTANT

## 2023-06-13 PROCEDURE — 83880 ASSAY OF NATRIURETIC PEPTIDE: CPT | Performed by: EMERGENCY MEDICINE

## 2023-06-13 PROCEDURE — 85610 PROTHROMBIN TIME: CPT | Performed by: STUDENT IN AN ORGANIZED HEALTH CARE EDUCATION/TRAINING PROGRAM

## 2023-06-13 PROCEDURE — 86901 BLOOD TYPING SEROLOGIC RH(D): CPT | Performed by: EMERGENCY MEDICINE

## 2023-06-13 PROCEDURE — 36415 COLL VENOUS BLD VENIPUNCTURE: CPT | Performed by: STUDENT IN AN ORGANIZED HEALTH CARE EDUCATION/TRAINING PROGRAM

## 2023-06-13 PROCEDURE — 85018 HEMOGLOBIN: CPT | Performed by: EMERGENCY MEDICINE

## 2023-06-13 PROCEDURE — 93005 ELECTROCARDIOGRAM TRACING: CPT

## 2023-06-13 PROCEDURE — 96374 THER/PROPH/DIAG INJ IV PUSH: CPT | Mod: 59

## 2023-06-13 PROCEDURE — 80048 BASIC METABOLIC PNL TOTAL CA: CPT | Performed by: EMERGENCY MEDICINE

## 2023-06-13 RX ORDER — ACETAMINOPHEN 325 MG/1
650 TABLET ORAL ONCE
Status: COMPLETED | OUTPATIENT
Start: 2023-06-13 | End: 2023-06-13

## 2023-06-13 RX ORDER — NICOTINE POLACRILEX 4 MG
15-30 LOZENGE BUCCAL
Status: DISCONTINUED | OUTPATIENT
Start: 2023-06-13 | End: 2023-06-16 | Stop reason: HOSPADM

## 2023-06-13 RX ORDER — DEXTROSE MONOHYDRATE 25 G/50ML
25-50 INJECTION, SOLUTION INTRAVENOUS
Status: DISCONTINUED | OUTPATIENT
Start: 2023-06-13 | End: 2023-06-16 | Stop reason: HOSPADM

## 2023-06-13 RX ORDER — SERTRALINE HYDROCHLORIDE 100 MG/1
100 TABLET, FILM COATED ORAL DAILY
Status: DISCONTINUED | OUTPATIENT
Start: 2023-06-13 | End: 2023-06-16 | Stop reason: HOSPADM

## 2023-06-13 RX ORDER — AMOXICILLIN 250 MG
2 CAPSULE ORAL 2 TIMES DAILY PRN
Status: DISCONTINUED | OUTPATIENT
Start: 2023-06-13 | End: 2023-06-16 | Stop reason: HOSPADM

## 2023-06-13 RX ORDER — INDAPAMIDE 1.25 MG/1
1.25 TABLET ORAL EVERY MORNING
Status: DISCONTINUED | OUTPATIENT
Start: 2023-06-14 | End: 2023-06-16 | Stop reason: HOSPADM

## 2023-06-13 RX ORDER — WARFARIN SODIUM 2.5 MG/1
2.5-3.75 TABLET ORAL SEE ADMIN INSTRUCTIONS
COMMUNITY
End: 2023-07-26

## 2023-06-13 RX ORDER — AMOXICILLIN 250 MG
1 CAPSULE ORAL 2 TIMES DAILY PRN
Status: DISCONTINUED | OUTPATIENT
Start: 2023-06-13 | End: 2023-06-16 | Stop reason: HOSPADM

## 2023-06-13 RX ORDER — FENTANYL CITRATE 50 UG/ML
50 INJECTION, SOLUTION INTRAMUSCULAR; INTRAVENOUS ONCE
Status: COMPLETED | OUTPATIENT
Start: 2023-06-13 | End: 2023-06-13

## 2023-06-13 RX ORDER — GLIPIZIDE 2.5 MG/1
2.5 TABLET, EXTENDED RELEASE ORAL 2 TIMES DAILY
Status: DISCONTINUED | OUTPATIENT
Start: 2023-06-13 | End: 2023-06-16 | Stop reason: HOSPADM

## 2023-06-13 RX ORDER — ONDANSETRON 2 MG/ML
4 INJECTION INTRAMUSCULAR; INTRAVENOUS EVERY 6 HOURS PRN
Status: DISCONTINUED | OUTPATIENT
Start: 2023-06-13 | End: 2023-06-16 | Stop reason: HOSPADM

## 2023-06-13 RX ORDER — POLYETHYLENE GLYCOL 3350 17 G/17G
17 POWDER, FOR SOLUTION ORAL DAILY PRN
Status: DISCONTINUED | OUTPATIENT
Start: 2023-06-13 | End: 2023-06-16 | Stop reason: HOSPADM

## 2023-06-13 RX ORDER — ACETAMINOPHEN 325 MG/1
975 TABLET ORAL 2 TIMES DAILY
Status: DISCONTINUED | OUTPATIENT
Start: 2023-06-13 | End: 2023-06-16 | Stop reason: HOSPADM

## 2023-06-13 RX ORDER — IOPAMIDOL 755 MG/ML
500 INJECTION, SOLUTION INTRAVASCULAR ONCE
Status: COMPLETED | OUTPATIENT
Start: 2023-06-13 | End: 2023-06-13

## 2023-06-13 RX ORDER — ONDANSETRON 4 MG/1
4 TABLET, ORALLY DISINTEGRATING ORAL EVERY 6 HOURS PRN
Status: DISCONTINUED | OUTPATIENT
Start: 2023-06-13 | End: 2023-06-16 | Stop reason: HOSPADM

## 2023-06-13 RX ORDER — FAMOTIDINE 20 MG/1
20 TABLET, FILM COATED ORAL DAILY
COMMUNITY
End: 2024-01-12

## 2023-06-13 RX ADMIN — ACETAMINOPHEN 975 MG: 325 TABLET, FILM COATED ORAL at 22:06

## 2023-06-13 RX ADMIN — ACETAMINOPHEN 650 MG: 325 TABLET, FILM COATED ORAL at 15:45

## 2023-06-13 RX ADMIN — SODIUM CHLORIDE 100 ML: 9 INJECTION, SOLUTION INTRAVENOUS at 16:30

## 2023-06-13 RX ADMIN — IOPAMIDOL 73 ML: 755 INJECTION, SOLUTION INTRAVENOUS at 16:30

## 2023-06-13 RX ADMIN — FENTANYL CITRATE 50 MCG: 50 INJECTION, SOLUTION INTRAMUSCULAR; INTRAVENOUS at 15:45

## 2023-06-13 RX ADMIN — DICLOFENAC SODIUM 2 G: 10 GEL TOPICAL at 22:18

## 2023-06-13 ASSESSMENT — ACTIVITIES OF DAILY LIVING (ADL)
ADLS_ACUITY_SCORE: 44
ADLS_ACUITY_SCORE: 35

## 2023-06-13 NOTE — PROGRESS NOTES
Clinic Care Coordination Contact  Care Coordination Clinician Chart Review    Situation: Patient chart reviewed by care coordinator.    Background: Clinic Care Coordination Referral placed by PCP for TCU placement.    Assessment: Upon chart review, patient is not a candidate for Primary Care Clinic Care Coordination enrollment due to reason stated below:  Patient is currently admitted to ED. Patient is not a candidate for TCU placement from community due to insurance requirements. Please refer to [facility] note dated 6/13/2023 for further information regarding above.     Plan/Recommendations: Clinic Care Coordination Referral/order cancelled. [facility] will perform no further monitoring/outreaches at this time and will remain available as needed. If new needs arise, a new Care Coordination Referral may be placed.    ANA LUISA Pozo/LICSW  Social Work Care Coordinator  [facility] [place]  Phone: [phone]

## 2023-06-13 NOTE — TELEPHONE ENCOUNTER
Called patient and verbalized understanding.  she stated that she may need to be admitted to the hospital for physical therapy.  She will call and let us know if she is not going to be able to make it to the upcoming appointment.      Patient stated she is unable to use her walker due to her tremors and feeling unsafe like she is going to fall.  Patient now just using her scooter, so she will become even more deconditioned.

## 2023-06-13 NOTE — PROGRESS NOTES
ANTICOAGULATION MANAGEMENT     Savanna Rehman 82 year old female is on warfarin with subtherapeutic INR result. (Goal INR 2.0-3.0)    Recent labs: (last 7 days)     06/13/23  1247   INR 1.6*       ASSESSMENT       Source(s): Chart Review and Patient/Caregiver Call       Warfarin doses taken: Less warfarin taken than planned which may be affecting INR. Patient held warfarin for 3 doses due to concern with bruising.    Diet: No new diet changes identified    Medication/supplement changes: None noted    New illness, injury, or hospitalization: Yes, patient fell on 6/5, seen in ED 6/10/23 for large amount of bruising on buttock into upper legs, left ear. Reports pain in hip, buttocks and back.  No headache, reported nausea and dizziness prior to ED transport Patient requesting rehab for PT. Patient is in the process of being admitted, but is required to go to ER for 3 days prior to placement. Patient is in the process of securing ride today to ED.     Signs or symptoms of bleeding or clotting: Yes: Buttocks, leg and ear from fall    Previous result: Therapeutic last 2(+) visits    Additional findings: Conservative boost due to brusing/fall and pending eval in ED today.     History of frequent falls while on anticoagulant.         PLAN     Recommended plan for temporary change(s) affecting INR     Dosing Instructions: 6/13/23: Booster dose, then continue current maintenance dose with next INR in 3 days if not seen in ED or discharged to TCU/Rehab     Summary  As of 6/13/2023    Full warfarin instructions:  6/13: 3.75 mg; Otherwise 3.75 mg every Sun, Wed, Fri; 2.5 mg all other days   Next INR check:  6/16/2023             Telephone call with Raj HORTON home care nurse who agrees to plan and repeated back plan correctly   RN will assist patient with securing a ride through non emergency EMS if possible.    Orders given to  Homecare nurse/facility to recheck    Education provided:     Please call back if any changes to  your diet, medications or how you've been taking warfarin    Goal range and lab monitoring: goal range and significance of current result    If not seen in ED or admitted to rehab or other care facility patient is to contact [facility] clinic for further follow up     Plan made per [facility] anticoagulation protocol    Negrita Gramajo RN  Anticoagulation Clinic  6/13/2023    _______________________________________________________________________     Anticoagulation Episode Summary     Current INR goal:  2.0-3.0   TTR:  60.7 % (1 y)   Target end date:  Indefinite   Send INR reminders to:  NIKKI CHASE    Indications    Permanent atrial fibrillation (H) [I48.21]  Chronic atrial fibrillation (H) [I48.20]  Long term (current) use of anticoagulants [Z79.01]           Comments:  Home care          Anticoagulation Care Providers     Provider Role Specialty Phone number    Patti Suárez MD Referring Internal Medicine [phone]

## 2023-06-13 NOTE — TELEPHONE ENCOUNTER
Message sent to anticoagulation pool-Miko SHIPMAN       Per staff message from anticoagulation:  Dr. Osman     Pt is scheduled to see you Thursday but states she won't make it as she is going to inpatient rehab for three weeks starting Wednesday. Are you OK with her continuing her anticoagulation right now? OR would you like her to stop?     Thank you,   Miko SHIPMAN, RN   Anticoagulation Nurse

## 2023-06-13 NOTE — PROGRESS NOTES
Clinic Care Coordination Contact    [facility] received request to assist in finding immediate TCU placement for patient. Per chart review no TCU orders have been placed. CC spoke with patient to review TCU request. Patient shared she has primarily been using her electric scooter to ambulate throughout her home as she is fatigued by the end of the day. Patient shared her primary concern is lack of strength and would prefer inpatient rehab due to frequency of PT visits. SWCC explain insurance coverage of TCU stay including needing a documented (PT/OT recommendation) of a skilled need for TCU. SWCC encouraged Patient to contact their insurance provider to better understand TCU insurance coverage and it's limits/requirements (for example, required length of hospital stay), and if a prior authorization is needed. Patient shared she has spoke with her insurance and stated they require a 3 night inpatient stay for TCU admission. Patient inquiring if recent ED visit would count toward minimum number of nights required by insurance. CC provided education surrounding ED visits vs inpatient admissions. Patient expressed understanding. Patient inquiring about alternate options. Reviewed outpatient PT vs homecare services. Patient voiced barriers to outpatient PT would be transportation. Patient shared she utilizes [employer] whom often runs late and she would likely miss outpatient appointments. Reviewed potential ability to request homecare referral from PCP, however, patient shared she would lose her current home RN, Raj. [facility] unable to determine Raj's role with patient. Offered to contact patients home RN, patient in agreement with plan and shared Raj would be visiting patient on this day around 12pm. CC to call patient back at this above time to review Raj's role and potential addition of home PT services. Patient voiced no further needs at this time.     12:10pm - [facility] spoke with patient and home RN,  Raj, to review above options. Per report [facility] does not offer therapy services, therefore, patient would potentially need to switch agencies to obtain in home therapy services. Patient shared her home RN is paid for by the [facility] and she is not interested in switching agencies at this time. Offered to contact patients county worker to discuss above further. Patient politely declined stating she does not want in home therapy due to limited amount of visits. Patient would prefer inpatient therapy due to frequency of daily visits. Reviewed patients insurance requires a 3 night inpatient stay for TCU placement, therefore, patient would need to present to ED for an evaluation/potential admission. Patient expressed understanding and shared she would present to ED after securing a ride. Allowed time and space for patient to voice any additional needs. Patient thankful for call and declined any further needs/supports at this time. Updated clinic care team on above.     Plan: Patient was encouraged to call with questions, concerns, and/or support needs. SW CC will remain available as needed. No further care coordination outreaches will be made at this time.     ANA LUISA Pozo/LICSW  Social Work Care Coordinator  [facility], and [place]  Phone: [phone]

## 2023-06-13 NOTE — ED NOTES
[facility]  ED Nurse Handoff Report    ED Chief complaint: Chest Pain  . ED Diagnosis:   Final diagnoses:   Chest pain, unspecified type   Elevated troponin   Traumatic hematoma of buttock, subsequent encounter   Long term current use of anticoagulant therapy   Subtherapeutic international normalized ratio (INR)   Anemia due to blood loss, acute       Allergies:   Allergies   Allergen Reactions     Augmented Betamethasone Diprop [Betamethasone] Other (See Comments)     Severe yeast infection     Petroleum Jelly [Petrolatum] Anaphylaxis     Rash and swelling     Shellfish-Derived Products Anaphylaxis     Tongue swelling     Aspirin Swelling     tiongue swelling     Bacitracin      Rash swelling     Bactrim [Sulfamethoxazole-Trimethoprim] Dizziness     Coumadin [Warfarin] Swelling     Leg swelling     Darvon [Propoxyphene] Swelling     Throat closes     Dilaudid [Hydromorphone]      No side effects from fentanyl June 2023     Levaquin [Levofloxacin] Swelling     Tongue swelling     Lidocaine      Neomycin Swelling     rash     Neosporin [Neomycin-Polymyxin-Gramicidin] Swelling     rash     Nitrofurantoin      SOB, GI upset,     Oxycodone      Severe itching     Percodan [Oxycodone-Aspirin]      Severe itching     Polymyxin B      Pramoxine      Tramadol      Vicodin [Hydrocodone-Acetaminophen]      Severe itching       Xarelto [Rivaroxaban]      Adhesive Tape Rash     Band aids      Codeine Rash     Hydrocortisone Rash and Swelling     Other Environmental Allergy Rash     Adhesive tape   Band aids        Code Status: Full Code    Activity level - Baseline/Home:  standby.  Activity Level - Current:   assist of 1.   Lift room needed: No.   Bariatric: No   Needed: No   Isolation: No.   Infection: Not Applicable.     Respiratory status: Room air    Vital Signs (within 30 minutes):   Vitals:    06/13/23 1511 06/13/23 1526 06/13/23 1541 06/13/23 1556   BP: (!) 140/65      Pulse: 65 71 64 67    Resp:       Temp:       TempSrc:       SpO2: 96% 97% 96% 95%   Weight:       Height:           Cardiac Rhythm:  ,   Cardiac  Cardiac Rhythm: Atrial fibrillation  Pain level:    Patient confused: No.   Patient Falls Risk: nonskid shoes/slippers when out of bed, arm band in place, patient and family education, and assistive device/personal items within reach.   Elimination Status: Has voided     Patient Report - Initial Complaint: CP and SOB.   Focused Assessment:  Savanna Rehman is a 82 year old female with a history of diabetes, A-fib, CKD, hyperlipidemia, amongst others, who presents with concerns of right-sided chest pain that came on around 130 this afternoon.  She was sitting in her chair watching TV when it occurred.  She describes the pain as dull that comes in waves and sometimes radiates across her chest.  It is worse with deep breaths.  She also endorses some right shoulder pain and feels short of breath.  She denies jaw or arm weakness.  Endorses some lightheadedness and nausea earlier, but no vomiting.  She does not have those symptoms currently.  She reports chronic lightheadedness and reports her blood pressures are usually low.  She has chronic A-fib, and is typically anticoagulated on warfarin, however patient had a fall approximately 8 days ago on 6/5/2023, and has not taken her warfarin for the past 3 days, which she decided to do herself because of the large hematoma on her right hip from the fall.  Her INR checked today and was subtherapeutic at 1.6 per the patient.  She has a home health nurse visit weekly, but she lives alone and has been having more falls lately.  She was evaluated here in the ER 3 days ago on 6/10/2023 for that fall    Abnormal Results:   Labs Ordered and Resulted from Time of ED Arrival to Time of ED Departure   TROPONIN T, HIGH SENSITIVITY - Abnormal       Result Value    Troponin T, High Sensitivity 25 (*)    BASIC METABOLIC PANEL - Abnormal    Sodium 136       Potassium 3.9      Chloride 97 (*)     Carbon Dioxide (CO2) 30 (*)     Anion Gap 9      Urea Nitrogen 18.1      Creatinine 1.02 (*)     Calcium 9.1      Glucose 185 (*)     GFR Estimate 55 (*)    CBC WITH PLATELETS - Abnormal    WBC Count 9.0      RBC Count 3.03 (*)     Hemoglobin 9.3 (*)     Hematocrit 28.6 (*)     MCV 94      MCH 30.7      MCHC 32.5      RDW 14.9      Platelet Count 184     D DIMER QUANTITATIVE - Abnormal    D-Dimer Quantitative 1.29 (*)    TROPONIN T, HIGH SENSITIVITY - Abnormal    Troponin T, High Sensitivity 25 (*)    INR - Abnormal    INR 1.46 (*)    TROPONIN T, HIGH SENSITIVITY - Abnormal    Troponin T, High Sensitivity 29 (*)    NT PROBNP INPATIENT - Normal    N terminal Pro BNP Inpatient 1,539     TYPE AND SCREEN, ADULT    ABO/RH(D) O NEG      Antibody Screen Negative      SPECIMEN EXPIRATION DATE [id number]     ABO/RH TYPE AND SCREEN        CT Chest Pulmonary Embolism w Contrast   Final Result   IMPRESSION:   1.  No evidence of pulmonary embolus.   2.  Mild cardiomegaly, with diffuse mild groundglass opacities, early edema versus atelectasis.   3.  Additional findings as above.          Treatments provided: see MAR   Family Comments: at bedside  OBS brochure/video discussed/provided to patient:  N/A  ED Medications:   Medications   fentaNYL (PF) (SUBLIMAZE) injection 50 mcg (50 mcg Intravenous $Given 6/13/23 1545)   acetaminophen (TYLENOL) tablet 650 mg (650 mg Oral $Given 6/13/23 1545)   0.9% sodium chloride BOLUS (100 mLs Intravenous $New Bag 6/13/23 1630)   iopamidol (ISOVUE-370) solution 500 mL (73 mLs Intravenous $Given 6/13/23 1630)       Drips infusing:  No  For the majority of the shift this patient was Green.   Interventions performed were n/a.    Sepsis treatment initiated: No    Cares/treatment/interventions/medications to be completed following ED care: n/a    ED Nurse Name: Immanuel Arteaga RN  6:50 PM    RECEIVING UNIT ED HANDOFF REVIEW    Above ED Nurse Handoff Report  was reviewed: Yes  Reviewed by: Angeles Sinclair RN on June 13, 2023 at 8:15 PM

## 2023-06-13 NOTE — TELEPHONE ENCOUNTER
The patient was evaluated in emergency room and she has a large right breast hematoma.  She has fatty liver, likely related with obesity  No other significant abdominal abnormalities  Patient has a follow-up appointment with Dr. Lauren is a 17

## 2023-06-13 NOTE — ED TRIAGE NOTES
Pt arrives via EMS from home d/t R sided CP that started around 1400 after home health RN left. Pain worse with movement and palpitations. EKG afib. ASA and one nitroglycerine given without improvement to pain. Also C/o SOB. ABC intact. A&Ox4.      Triage Assessment     Row Name 06/13/23 8259       Triage Assessment (Adult)    Airway WDL WDL       Respiratory WDL    Respiratory WDL X       Skin Circulation/Temperature WDL    Skin Circulation/Temperature WDL WDL       Cardiac WDL    Cardiac WDL X       Peripheral/Neurovascular WDL    Peripheral Neurovascular WDL WDL       Cognitive/Neuro/Behavioral WDL    Cognitive/Neuro/Behavioral WDL WDL

## 2023-06-13 NOTE — ED PROVIDER NOTES
History     Chief Complaint:  Chest Pain     HPI   Savanna Rehman is a 82 year old female with a history of diabetes, A-fib, CKD, hyperlipidemia, amongst others, who presents with concerns of right-sided chest pain that came on around 130 this afternoon.  She was sitting in her chair watching TV when it occurred.  She describes the pain as dull that comes in waves and sometimes radiates across her chest.  It is worse with deep breaths.  She also endorses some right shoulder pain and feels short of breath.  She denies jaw or arm weakness.  Endorses some lightheadedness and nausea earlier, but no vomiting.  She does not have those symptoms currently.  She reports chronic lightheadedness and reports her blood pressures are usually low.  She has chronic A-fib, and is typically anticoagulated on warfarin, however patient had a fall approximately 8 days ago on 6/5/2023, and has not taken her warfarin for the past 3 days, which she decided to do herself because of the large hematoma on her right hip from the fall.  Her INR checked today and was subtherapeutic at 1.6 per the patient.  She has a home health nurse visit weekly, but she lives alone and has been having more falls lately.  She was evaluated here in the ER 3 days ago on 6/10/2023 for that fall.    Upon chart review, head CT was negative for acute traumatic intracranial abnormality.  CT chest abdomen pelvis revealed large right buttock hematoma, no underlying fracture, as well as faint ill-defined peribronchial opacities in the right lower lobe, and a cirrhotic appearing liver.  She was discharged home.    Independent Historian:   Friend - They report Patient has been struggling at home with multiple falls, and they are hoping to have her placed in a TCU for therapy and rehabilitation.    Review of External Notes:   I reviewed her anticoagulation therapy visit note from today.  Her INR is subtherapeutic at 1.6.    Medications:    ACE/ARB/ARNI NOT PRESCRIBED  (INTENTIONAL)  blood glucose (NO BRAND SPECIFIED) lancets standard  blood glucose (NO BRAND SPECIFIED) lancing device  blood glucose (ONETOUCH ULTRA) test strip  blood glucose calibration (NO BRAND SPECIFIED) solution  blood glucose monitoring (NO BRAND SPECIFIED) meter device kit  butalbital-aspirin-caffeine (FIORINAL) -40 MG capsule  EPINEPHrine (ANY BX GENERIC EQUIV) 0.3 MG/0.3ML injection 2-pack  glipiZIDE (GLUCOTROL XL) 2.5 MG 24 hr tablet  indapamide (LOZOL) 1.25 MG tablet  loperamide (IMODIUM A-D) 2 MG tablet  meclizine (ANTIVERT) 12.5 MG tablet  METAMUCIL FIBER PO  sertraline (ZOLOFT) 100 MG tablet  warfarin ANTICOAGULANT (COUMADIN) 2.5 MG tablet        Past Medical History:    Past Medical History:   Diagnosis Date     Anemia      Atrial fibrillation (H)      CAD (coronary artery disease)      Chronic pain      Congestive heart failure (H)      Degenerative disk disease      Diabetes (H)      Fibromyalgia      Gastro-oesophageal reflux disease      Gout      Hiatal hernia      Mumps      Neuropathy      CRISTIANA (obstructive sleep apnea) - CPAP      Palpitations      Pernicious anemia      Sleep apnea      Urinary incontinence      Vitamin D deficiency        Past Surgical History:    Past Surgical History:   Procedure Laterality Date     APPENDECTOMY       CHOLECYSTECTOMY       COLONOSCOPY  3/15/2011     COLONOSCOPY N/A 3/15/2023    Procedure: COLONOSCOPY, WITH POLYPECTOMY AND BIOPSY;  Surgeon: Maci Coronel MD;  Location: [facility]     COLONOSCOPY N/A 3/15/2023    Procedure: COLONOSCOPY, FLEXIBLE, WITH LESION REMOVAL USING SNARE;  Surgeon: Maci Coronel MD;  Location: [facility]     CORONARY ANGIOGRAPHY ADULT ORDER       CYSTOSCOPY, BIOPSY BLADDER, COMBINED N/A 7/13/2020    Procedure: CYSTOSCOPY, WITH BLADDER BIOPSY;  Surgeon: Deisy Wilson MD;  Location: [facility]     HEART CATH LEFT HEART CATH  12/30/16    medication management     HYSTERECTOMY TOTAL ABDOMINAL       Knee replacement NOS  "Left      LAPAROSCOPIC NISSEN FUNDOPLICATION N/A 2/4/2015    Procedure: LAPAROSCOPIC NISSEN FUNDOPLICATION;  Surgeon: Armando Ansari MD;  Location: [facility] OR     TONSILLECTOMY       TRANSPOSITION ULNAR NERVE (ELBOW)          Physical Exam     Patient Vitals for the past 24 hrs:   BP Temp Temp src Pulse Resp SpO2 Height Weight   06/13/23 1556 -- -- -- 67 -- 95 % -- --   06/13/23 1541 -- -- -- 64 -- 96 % -- --   06/13/23 1526 -- -- -- 71 -- 97 % -- --   06/13/23 1511 (!) 140/65 -- -- 65 -- 96 % -- --   06/13/23 1456 134/66 -- -- 66 -- 97 % -- --   06/13/23 1445 (!) 155/70 -- -- 68 -- 98 % -- --   06/13/23 1442 -- 97.6  F (36.4  C) Oral 78 -- 98 % -- --   06/13/23 1436 (!) 169/81 -- -- 71 18 98 % 1.727 m (5' 8\") 109.3 kg (241 lb)      Physical Exam  Vital signs and nursing notes reviewed.    General:  Alert and oriented, no acute distress. Resting on bed with two friends at bedside.   Skin: Skin is warm and dry. No diaphoresis. Massive red and purple hematoma to entire right buttock. Crosses midline. Edges are yellow in color.  HEENT:   Head: Normocephalic, atraumatic. Facial features symmetric.   Eyes: Conjunctiva pink, sclera white. EOMs grossly intact.   Ears: Left auricle and preauricular area with yellow bruising. Minimally tender to palpation.  Nose: Symmetric with no discharge.  Mouth and throat: Lips and tongue moist with no lesions or edema.  Neck: Normal range of motion. Neck supple with no lymphadenopathy. No tracheal deviation.   CV:  Heart rate normal, irregular rhythm. 2+ radial pulses bilaterally. Slight peripheral edema bilaterally.  Pulm/Chest: No increased effort of breathing. Lungs clear and equal to auscultation bilaterally.   Abd: Abdomen is soft and nontender to palpation in all 4 quadrants.  M/S: Moves all extremities spontaneously. No tenderness to palpation of right shoulder. ROM. Pain to palpation of right hip hematoma, ranging hip normally  Psych: Normal mood and affect. Behavior is normal. "     Emergency Department Course   ECG  ECG taken at 1441, ECG read at 1510  Atrial Fibrillation   No significant change as compared to prior, dated 6/10/23.  Rate 75 bpm. NE interval * ms. QRS duration 108 ms. QT/QTc 358/399 ms. P-R-T axes * -21 83.     Imaging:  CT Chest Pulmonary Embolism w Contrast   Final Result   IMPRESSION:   1.  No evidence of pulmonary embolus.   2.  Mild cardiomegaly, with diffuse mild groundglass opacities, early edema versus atelectasis.   3.  Additional findings as above.         Report per radiology    Laboratory:  Labs Ordered and Resulted from Time of ED Arrival to Time of ED Departure   TROPONIN T, HIGH SENSITIVITY - Abnormal       Result Value    Troponin T, High Sensitivity 25 (*)    BASIC METABOLIC PANEL - Abnormal    Sodium 136      Potassium 3.9      Chloride 97 (*)     Carbon Dioxide (CO2) 30 (*)     Anion Gap 9      Urea Nitrogen 18.1      Creatinine 1.02 (*)     Calcium 9.1      Glucose 185 (*)     GFR Estimate 55 (*)    CBC WITH PLATELETS - Abnormal    WBC Count 9.0      RBC Count 3.03 (*)     Hemoglobin 9.3 (*)     Hematocrit 28.6 (*)     MCV 94      MCH 30.7      MCHC 32.5      RDW 14.9      Platelet Count 184     D DIMER QUANTITATIVE - Abnormal    D-Dimer Quantitative 1.29 (*)    TROPONIN T, HIGH SENSITIVITY - Abnormal    Troponin T, High Sensitivity 25 (*)    INR - Abnormal    INR 1.46 (*)    TROPONIN T, HIGH SENSITIVITY - Abnormal    Troponin T, High Sensitivity 29 (*)    NT PROBNP INPATIENT - Normal    N terminal Pro BNP Inpatient 1,539     TYPE AND SCREEN, ADULT    ABO/RH(D) O NEG      Antibody Screen Negative      SPECIMEN EXPIRATION DATE [id number]     ABO/RH TYPE AND SCREEN        Procedures   none    Emergency Department Course & Assessments:         Interventions:  Medications   fentaNYL (PF) (SUBLIMAZE) injection 50 mcg (50 mcg Intravenous $Given 6/13/23 1545)   acetaminophen (TYLENOL) tablet 650 mg (650 mg Oral $Given 6/13/23 1545)   0.9% sodium chloride  BOLUS (0 mLs Intravenous Stopped 6/13/23 1852)   iopamidol (ISOVUE-370) solution 500 mL (73 mLs Intravenous $Given 6/13/23 1630)      Independent Interpretation (X-rays, CTs, rhythm strip):  None    Consultations/Discussion of Management or Tests:  1735 I spoke with Gretel Elaine PA-C, with Dr. Doshi, hospitalist, regarding patient's presentation, findings, and plan of care.     Assessments:  1520 I initially assessed the patient and obtained the above history and physical exam.  1545 Reassessed the patient and updated on findings  ED Course as of 06/13/23 1739 Tue Jun 13, 2023 1646 I reassessed the patient   Dr. Colby assessed the patient    Social Determinants of Health affecting care:   Transportation and unsafe (falls) at home    Disposition:  The patient was admitted to the hospital under the care of Dr. Doshi.     Impression & Plan    CMS Diagnoses: None    Medical Decision Making:  Savanna Rehman is a 82 year old female with a history of A-fib, anticoagulated on Warfarin who presents for evaluation of chest pain that presented today at rest, that is worse with deep breaths and is associated with feeling short of breath. See HPI. Vital signs stable without hypoxia. ACS was considered. EKG shows rate controlled A-fib and no ischemic changes, however three serial Troponins are elevated at 25 and 29. She received baby aspirin and nitroglycerin in the ambulance without change of pain. Last coronary angio was in 2016 and did not require intervention.   Pulmonary embolism was also considered. Patient had a bad fall ~8 days ago and was evaluated in the ED 3 days ago and found to have a massive hip hematoma without underlying fracture. She then held her Warfarin for the past 3 days and INR has been subtherapeutic, 1.46 today. Dimer was elevated and CT chest was obtained. Fortunately, no signs of pulmonary embolus. No pneumothorax or rib fractures. The scan did note increased from CT 3 days ago: ground glass  opacities suggestive of pulmonary edema versus atelectasis and mild cardiomegaly. Acute heart failure is considered, however BNP is normal at 1,539. She does have some anemia, hemoglobin has decreased in the past 3 days from 11.2 to 9.3. The hip hematoma appears stable in size per the patient's daughter and her pain is not worsening, therefore low suspicion for active bleeding.  Additionally, patient, daughter, and friends, all express concerns about patients ambulation and safety at home. She has been using a mobility scooter to move around her house where she lives independently. There is interest in finding possible TCU placement for the patient. She will be admitted to the hospital to observation for further cardiac workup as well as placement consideration. Patient agreeable to plan and had questions answered. Dr. Doshi accepted the patient and will oversee further care and management.    I staffed this patient with Dr. Colby who agrees with the above assessment and plan.    Critical Care time:  was 0 minutes for this patient excluding procedures.    Diagnosis:    ICD-10-CM    1. Chest pain, unspecified type  R07.9       2. Elevated troponin  R77.8       3. Traumatic hematoma of buttock, subsequent encounter  S30.0XXD       4. Long term current use of anticoagulant therapy  Z79.01       5. Subtherapeutic international normalized ratio (INR)  R79.1       6. Anemia due to blood loss, acute  D62            Discharge Medications:  New Prescriptions    No medications on file      Aria Cabello PA-C  6/13/2023        Aria Cabello PA-C  06/13/23 3230

## 2023-06-13 NOTE — ED NOTES
Bed: ED15  Expected date: 6/13/23  Expected time: 2:15 PM  Means of arrival: Ambulance  Comments:  A593 - 82F

## 2023-06-13 NOTE — TELEPHONE ENCOUNTER
Can care coordination help this patient with any of this?  The provider number for [facility] is [phone].  The patient would like some help immediately.

## 2023-06-13 NOTE — TELEPHONE ENCOUNTER
Patient called and said she talked with her insurance and they told her they will pay for this. She said they told her she will only have to pay $150.00.

## 2023-06-13 NOTE — TELEPHONE ENCOUNTER
I placed a referral for neurology consult in March and again in June regarding her tremor and instability  Does she have an appointment with neurology?

## 2023-06-13 NOTE — ED PROVIDER NOTES
ED ATTENDING PHYSICIAN NOTE:   I evaluated this patient in conjunction with Aria Cabello PA-C  I have participated in the care of the patient and personally performed key elements of the history, exam, and medical decision making.      HPI:   Savanna Rehman is a 82 year old female with a history of Afib and diabetes who presents with right sided chest pain. The patient reports that around 1325 she had onset, and described as dull. It has been constant but intermittently spikes. The pain is pleuritic with shortness of breath, and she has never had it before. She has not taken her Warfarin in 3 days due to a fall she had about a week ago. She was seen in the ED for the fall. She denies any nausea or diaphoresis, but notes some light headedness. She does have a minor cough. Last night she could not walk with her walker, which is somewhat newer.     Independent Historian:   None - Patient Only    Review of External Notes: Emergency department note dated 6/10/2023.     EXAM:   Constitutional: Well appearing.  HEENT: Atraumatic.  PERRL.  EOMI.  Moist mucous membranes.  Neck: Soft.  Supple.  No JVD.  Cardiac: Regular rate with an irregularly irregular rhythm.  No murmur or rub.  Respiratory: Clear to auscultation bilaterally.  No respiratory distress.  No wheezing, rhonchi, or rales.  Abdomen: Soft and nontender.  No rebound or guarding.  Nondistended.  Musculoskeletal: Large older appearing hematoma with varying colors of purple and blue throughout the right buttock and up to the lower back and left buttock.  No bony tenderness.  No edema.  Normal range of motion.  Neurologic: Alert and oriented x3.  Normal tone and bulk.   Skin: No rashes.  No edema.  Psych: Normal affect.  Normal behavior.      ECG  ECG taken at 1441, ECG read at 1530  Atrial fibrillation   Low voltage QRS   Cannot rule out Anterior infarct, age undetermined   Abnormal ECG   NO significant change as compared to prior, dated 6/10/23.  Rate 75  bpm. NM interval * ms. QRS duration 108 ms. QT/QTc 358/399 ms. P-R-T axes * -21 83.     Independent Interpretation (X-rays, CTs, rhythm strip):      Consultations/Discussion of Management or Tests:  None     Social Determinants of Health affecting care:   None     MEDICAL DECISION MAKING/ASSESSMENT AND PLAN:   Savanna Rehman is an 82-year-old woman who is afebrile and hemodynamically stable.  Her EKG demonstrates atrial fibrillation without acute ischemic changes.  Initial troponin is slightly elevated at 25.  Her pain does seem slightly atypical and could be related to her fall, however, she did not have it after her fall.  She has a large hematoma on her lower back and buttock that does not appear to be growing.  She has some slight acute blood loss anemia down to 9.3 but is otherwise hemodynamically stable and I see no occasion for transfusion.  This certainly could exacerbate underlying ACS as well or could contribute to demand ischemia.  Nonetheless, she is unsafe to go home at this time as she is having difficulty ambulating at her facility and will require admission and potential rehab placement.  She is in agreement her questions were answered.  Discussed with the hospitalist service who accepts her for admission and she is in stable condition at time of admission.     DIAGNOSIS:     ICD-10-CM    1. Constipation, unspecified constipation type  K59.00 polyethylene glycol (MIRALAX) 17 g packet     senna-docusate (SENOKOT-S/PERICOLACE) 8.6-50 MG tablet      2. Chest pain, unspecified type  R07.9 Primary Care - Care Coordination Referral      3. Elevated troponin  R77.8       4. Traumatic hematoma of buttock, subsequent encounter  S30.0XXD acetaminophen (TYLENOL) 325 MG tablet     diclofenac (VOLTAREN) 1 % topical gel      5. Long term current use of anticoagulant therapy  Z79.01       6. Subtherapeutic international normalized ratio (INR)  R79.1       7. Anemia due to blood loss, acute  D62       8. Chest  wall pain  R07.89 diclofenac (VOLTAREN) 1 % topical gel          DISPOSITION:   The patient was admitted to the hospital under the care of Dr. Doshi.    Scribe Disclosure:  I, Justin Jamison, am serving as a scribe at 3:12 PM on 6/13/2023 to document services personally performed by Luis Colby MD based on my observations and the provider's statements to me.      6/13/2023  [facility] EMERGENCY DEPT     Luis Colby MD  06/16/23 8928

## 2023-06-13 NOTE — TELEPHONE ENCOUNTER
Patient called to report left sided chest pressure when breathing up to her collar bone.      She also reported she is still bleeding from her bottom and going down her leg from the fall she sustained.     She was advised by the Licensed Social Worker to go to the ED, and she agreed she would. She is reluctant to call 911 and pay [phone]$.     While waiting to connect to an RN, the patient hung up.      pls call her at [phone]

## 2023-06-14 ENCOUNTER — APPOINTMENT (OUTPATIENT)
Dept: PHYSICAL THERAPY | Facility: CLINIC | Age: 83
End: 2023-06-14
Attending: PHYSICIAN ASSISTANT
Payer: COMMERCIAL

## 2023-06-14 ENCOUNTER — MEDICAL CORRESPONDENCE (OUTPATIENT)
Dept: HEALTH INFORMATION MANAGEMENT | Facility: CLINIC | Age: 83
End: 2023-06-14

## 2023-06-14 LAB
ANION GAP SERPL CALCULATED.3IONS-SCNC: 9 MMOL/L (ref 7–15)
ATRIAL RATE - MUSE: 100 BPM
BUN SERPL-MCNC: 17.1 MG/DL (ref 8–23)
CALCIUM SERPL-MCNC: 8.8 MG/DL (ref 8.8–10.2)
CHLORIDE SERPL-SCNC: 99 MMOL/L (ref 98–107)
CREAT SERPL-MCNC: 1.01 MG/DL (ref 0.51–0.95)
DEPRECATED HCO3 PLAS-SCNC: 29 MMOL/L (ref 22–29)
DIASTOLIC BLOOD PRESSURE - MUSE: NORMAL MMHG
ERYTHROCYTE [DISTWIDTH] IN BLOOD BY AUTOMATED COUNT: 15.2 % (ref 10–15)
GFR SERPL CREATININE-BSD FRML MDRD: 56 ML/MIN/1.73M2
GLUCOSE BLDC GLUCOMTR-MCNC: 149 MG/DL (ref 70–99)
GLUCOSE BLDC GLUCOMTR-MCNC: 172 MG/DL (ref 70–99)
GLUCOSE SERPL-MCNC: 145 MG/DL (ref 70–99)
HCT VFR BLD AUTO: 26.4 % (ref 35–47)
HGB BLD-MCNC: 8.3 G/DL (ref 11.7–15.7)
INTERPRETATION ECG - MUSE: NORMAL
MCH RBC QN AUTO: 30.3 PG (ref 26.5–33)
MCHC RBC AUTO-ENTMCNC: 31.4 G/DL (ref 31.5–36.5)
MCV RBC AUTO: 96 FL (ref 78–100)
P AXIS - MUSE: NORMAL DEGREES
PLATELET # BLD AUTO: 165 10E3/UL (ref 150–450)
POTASSIUM SERPL-SCNC: 3.8 MMOL/L (ref 3.4–5.3)
PR INTERVAL - MUSE: NORMAL MS
QRS DURATION - MUSE: 108 MS
QT - MUSE: 358 MS
QTC - MUSE: 399 MS
R AXIS - MUSE: -21 DEGREES
RBC # BLD AUTO: 2.74 10E6/UL (ref 3.8–5.2)
SODIUM SERPL-SCNC: 137 MMOL/L (ref 136–145)
SYSTOLIC BLOOD PRESSURE - MUSE: NORMAL MMHG
T AXIS - MUSE: 83 DEGREES
VENTRICULAR RATE- MUSE: 75 BPM
WBC # BLD AUTO: 6.5 10E3/UL (ref 4–11)

## 2023-06-14 PROCEDURE — G0378 HOSPITAL OBSERVATION PER HR: HCPCS

## 2023-06-14 PROCEDURE — 97110 THERAPEUTIC EXERCISES: CPT | Mod: GP

## 2023-06-14 PROCEDURE — 36415 COLL VENOUS BLD VENIPUNCTURE: CPT | Performed by: PHYSICIAN ASSISTANT

## 2023-06-14 PROCEDURE — 82962 GLUCOSE BLOOD TEST: CPT

## 2023-06-14 PROCEDURE — 97161 PT EVAL LOW COMPLEX 20 MIN: CPT | Mod: GP

## 2023-06-14 PROCEDURE — 99232 SBSQ HOSP IP/OBS MODERATE 35: CPT | Performed by: HOSPITALIST

## 2023-06-14 PROCEDURE — 250N000013 HC RX MED GY IP 250 OP 250 PS 637: Performed by: PHYSICIAN ASSISTANT

## 2023-06-14 PROCEDURE — 85027 COMPLETE CBC AUTOMATED: CPT | Performed by: PHYSICIAN ASSISTANT

## 2023-06-14 PROCEDURE — 250N000013 HC RX MED GY IP 250 OP 250 PS 637: Performed by: HOSPITALIST

## 2023-06-14 PROCEDURE — 97530 THERAPEUTIC ACTIVITIES: CPT | Mod: GP

## 2023-06-14 PROCEDURE — 80048 BASIC METABOLIC PNL TOTAL CA: CPT | Performed by: PHYSICIAN ASSISTANT

## 2023-06-14 RX ORDER — FAMOTIDINE 20 MG/1
20 TABLET, FILM COATED ORAL DAILY
Status: DISCONTINUED | OUTPATIENT
Start: 2023-06-14 | End: 2023-06-16 | Stop reason: HOSPADM

## 2023-06-14 RX ORDER — MECLIZINE HCL 12.5 MG 12.5 MG/1
12.5 TABLET ORAL 3 TIMES DAILY
Status: DISCONTINUED | OUTPATIENT
Start: 2023-06-14 | End: 2023-06-16 | Stop reason: HOSPADM

## 2023-06-14 RX ADMIN — DICLOFENAC SODIUM 2 G: 10 GEL TOPICAL at 15:43

## 2023-06-14 RX ADMIN — ACETAMINOPHEN 975 MG: 325 TABLET, FILM COATED ORAL at 08:16

## 2023-06-14 RX ADMIN — SENNOSIDES AND DOCUSATE SODIUM 2 TABLET: 50; 8.6 TABLET ORAL at 20:00

## 2023-06-14 RX ADMIN — GLIPIZIDE 2.5 MG: 2.5 TABLET, FILM COATED, EXTENDED RELEASE ORAL at 09:57

## 2023-06-14 RX ADMIN — MECLIZINE 12.5 MG: 12.5 TABLET ORAL at 20:00

## 2023-06-14 RX ADMIN — DICLOFENAC SODIUM 2 G: 10 GEL TOPICAL at 08:17

## 2023-06-14 RX ADMIN — GLIPIZIDE 2.5 MG: 2.5 TABLET, FILM COATED, EXTENDED RELEASE ORAL at 20:00

## 2023-06-14 RX ADMIN — MECLIZINE 12.5 MG: 12.5 TABLET ORAL at 13:42

## 2023-06-14 RX ADMIN — FAMOTIDINE 20 MG: 20 TABLET, FILM COATED ORAL at 09:58

## 2023-06-14 RX ADMIN — ACETAMINOPHEN 975 MG: 325 TABLET, FILM COATED ORAL at 20:00

## 2023-06-14 RX ADMIN — INDAPAMIDE 1.25 MG: 1.25 TABLET, FILM COATED ORAL at 09:57

## 2023-06-14 RX ADMIN — SERTRALINE HYDROCHLORIDE 100 MG: 100 TABLET ORAL at 08:16

## 2023-06-14 RX ADMIN — DICLOFENAC SODIUM 2 G: 10 GEL TOPICAL at 13:42

## 2023-06-14 RX ADMIN — DICLOFENAC SODIUM 2 G: 10 GEL TOPICAL at 19:35

## 2023-06-14 ASSESSMENT — ACTIVITIES OF DAILY LIVING (ADL)
ADLS_ACUITY_SCORE: 43
DEPENDENT_IADLS:: CLEANING;COOKING;LAUNDRY;SHOPPING;MEAL PREPARATION;TRANSPORTATION
ADLS_ACUITY_SCORE: 44
ADLS_ACUITY_SCORE: 43
ADLS_ACUITY_SCORE: 44
ADLS_ACUITY_SCORE: 43

## 2023-06-14 NOTE — PROGRESS NOTES
"[facility]    Medicine Progress Note - Hospitalist Service    Date of Admission:  6/13/2023    Assessment & Plan   Savanna Rehman is a 82 year old female with PMHx significant for a fib anticoagulated on coumadin, HTN, HFpEF, DM type II, GERD, and CRISTIANA, who was admitted on 6/13/2023 with chest pain. She also reports a fall ~1 week ago resulting in large hematoma to the R buttocks, low back and upper thigh.     Chest pain     - suspect musculoskeletal, related to recent fall    - CT PE done: neg for PE    - pain has improved with diclofenac    - no further cardiac work-up      Dizziness    - she describes as \"whooshing\"    - has been chronic    - was given meclizine by her PCP, but she never tried it    - will trial meclizine    Frequent falls    - patient is deconditioned    - lives in [address] independent living    - uses a scooter for distances    - falls likely multifactorial: deconditioning, obesity, dizziness, tremor    - PT to see, patient will need TCU    - already has outpatient Neuro referral     L buttock, thigh and low back hematoma    - had a fall > 1week ago    - was seen in the ED    - has hematoma    Acute anemia     - Hgb within normal limits prior to recent fall, suspect blood loss due to rather large size of hematoma.     - Hgb 14.8 in May>>11.2 1 week ago>> 9.3 on admission>> 8.3 today    - has had brown stool    - likely due to hematoma    - monitor    - coumadin on hold    A fib anticoagulated on coumadin    - hold coumadin per above    - not on rate controlling medications    HTN  HFpEF  LE edema    - EF 55-60% on last echo in 2020. Mod tricuspid regurg.    - cont Indapamide    DM type II    - resumed home glipizide    - glucose checks BID and hs    - hypoglycemia protocol    CRISTIANA    - has CPAP but not compliant      Daughter in room. Questions answered       Diet: Moderate Consistent Carb (60 g CHO per Meal) Diet    DVT Prophylaxis: INR was 1.4 on 6/13. If Hb stable in am, " "resume coumadin  Aguilar Catheter: Not present  Lines: None     Cardiac Monitoring: ACTIVE order. Indication: Chest pain/ ACS rule out (24 hours)  Code Status: Full Code      Clinically Significant Risk Factors Present on Admission               # Drug Induced Coagulation Defect: home medication list includes an anticoagulant medication    # Hypertension: Home medication list includes antihypertensive(s)      # Obesity: Estimated body mass index is 37.81 kg/m  as calculated from the following:    Height as of this encounter: 1.727 m (5' 8\").    Weight as of this encounter: 112.8 kg (248 lb 10.9 oz).            Disposition Plan      Expected Discharge Date: 06/14/2023                  Hardik Doshi MD  Hospitalist Service  [facility]  Securely message with [employer] (more info)  Text page via [employer] Paging/Directory   ______________________________________________________________________    Interval History   Patient in room with daughter. No new complaints. Chest pain controlled with diclofenac gel. Had brown stool today. Eating and drinking. Does endorse some dizziness which is chronic. (Described as whoosing)    Physical Exam   Vital Signs: Temp: 98.5  F (36.9  C) Temp src: Oral BP: 128/60 Pulse: 78   Resp: 18 SpO2: 97 % O2 Device: None (Room air)    Weight: 248 lbs 10.86 oz    Constitutional: awake, alert, cooperative, no apparent distress, and appears stated age  Eyes: Lids and lashes normal, pupils equal, round and reactive to light, extra ocular muscles intact, sclera clear, conjunctiva normal  ENT: Normocephalic, without obvious abnormality, atraumatic, sinuses nontender on palpation, external ears without lesions, oral pharynx with moist mucous membranes, tonsils without erythema or exudates, gums normal and good dentition.  Respiratory: No increased work of breathing, good air exchange, clear to auscultation bilaterally, no crackles or wheezing  Cardiovascular: Normal apical impulse, " regular rate and rhythm, normal S1 and S2, no S3 or S4, and no murmur noted  GI: No scars, normal bowel sounds, soft, non-distended, non-tender, no masses palpated, no hepatosplenomegally  Skin: large ecchymosis over right buttock/thigh    Medical Decision Making     45 MINUTES SPENT BY ME on the date of service doing chart review, history, exam, documentation & further activities per the note.      Data     I have personally reviewed the following data over the past 24 hrs:    6.5  \   8.3 (L)   / 165     137 99 17.1 /  145 (H)   3.8 29 1.01 (H) \       Trop: 29 (H) BNP: 1,539       INR:  1.46 (H) PTT:  N/A   D-dimer:  1.29 (H) Fibrinogen:  N/A       Imaging results reviewed over the past 24 hrs:   Recent Results (from the past 24 hour(s))   CT Chest Pulmonary Embolism w Contrast    Narrative    EXAM: CT CHEST PULMONARY EMBOLISM W CONTRAST  LOCATION: [facility]  DATE: 6/13/2023    INDICATION: right chest pain, shoulder pain, SOB, elevated dimer  COMPARISON: 06/10/2023  TECHNIQUE: CT chest pulmonary angiogram during arterial phase injection of IV contrast. Multiplanar reformats and MIP reconstructions were performed. Dose reduction techniques were used.   CONTRAST: 73mL Isovue 370    FINDINGS:  ANGIOGRAM CHEST: Mild prominence of the central pulmonary arteries which may be seen with underlying pulmonary arterial hypertension. No evidence of pulmonary embolism. Although contrast bolus is limited, there is no evidence of thoracic aortic   dissection. No CT evidence of right heart strain.    LUNGS AND PLEURA: Increased groundglass opacity throughout both lungs, slightly greater in the dependent portions. No pneumothorax.    MEDIASTINUM/AXILLAE: Mild cardiomegaly. No significant mediastinal or hilar adenopathy.    CORONARY ARTERY CALCIFICATION: Mild to moderate    UPPER ABDOMEN: Morphologic changes of cirrhosis. Cholecystectomy. Postoperative changes at the GE junction. Moderate atherosclerotic  disease.    MUSCULOSKELETAL: No acute bony abnormalities.      Impression    IMPRESSION:  1.  No evidence of pulmonary embolus.  2.  Mild cardiomegaly, with diffuse mild groundglass opacities, early edema versus atelectasis.  3.  Additional findings as above.

## 2023-06-14 NOTE — PLAN OF CARE
PRIMARY DIAGNOSIS: CHEST PAIN  OUTPATIENT/OBSERVATION GOALS TO BE MET BEFORE DISCHARGE:  1. Ruled out acute coronary syndrome (negative or stable Troponin):  Yes  2. Pain Status: Improved-controlled with oral pain medications.  3. Appropriate provocative testing performed: Yes  - Interpretation of cardiac rhythm per telemetry tech: A-fib controlled 70s      4. Cleared by Consultants (if applicable):N/A  5. Return to near baseline physical activity: Yes  Discharge Planner Nurse   Safe discharge environment identified: No  Barriers to discharge: Yes       Entered by: Trinh Palmer RN 06/14/2023 4:25 AM     Please review provider order for any additional goals.   Nurse to notify provider when observation goals have been met and patient is ready for discharge.    Vitals are Temp: 98.4  F (36.9  C) Temp src: Oral BP: 121/46 Pulse: 76   Resp: 18 SpO2: 95 %.  Patient is Alert and Oriented x4. They are 1 Assist pivot. Uses motorized w/c at home. Pt is a Regular diet.  Denying pain. Patient is Saline locked. Incontinent of bladder - refusing use of Purewick. IS at bedside. On tele. PT and SW to consult.

## 2023-06-14 NOTE — PHARMACY-ADMISSION MEDICATION HISTORY
Pharmacist Admission Medication History    Admission medication history is complete. The information provided in this note is only as accurate as the sources available at the time of the update.    Medication reconciliation/reorder completed by provider prior to medication history? No    Information Source(s): Caregiver via phone  [[facility] -[phone]]  Pertinent Information: none    Changes made to PTA medication list:    Added: vitamin D3, famotidine    Deleted: None    Changed: warfarin (updated to current dosing)    Medication Affordability: no issues  Allergies reviewed with patient and updates made in EHR: no  Medication History Completed By: Justin Sun RPH 6/13/2023 9:12 PM    Prior to Admission medications    Medication Sig Last Dose Taking? Auth Provider Long Term End Date   butalbital-aspirin-caffeine (FIORINAL) -40 MG capsule TAKE 1 CAPSULE BY MOUTH EVERY 6 HOURS AS NEEDED FOR HEADACHE More than a month Yes Patti Suárez MD     EPINEPHrine (ANY BX GENERIC EQUIV) 0.3 MG/0.3ML injection 2-pack INJECT 0.3MLS (0.3MG) INTO THE MUSCLE ONCE AS NEEDED FOR ANAPHYLAXIS More than a month Yes Patti Suárez MD     famotidine (PEPCID) 20 MG tablet Take 20 mg by mouth daily 6/12/2023 at am Yes Unknown, Entered By History     glipiZIDE (GLUCOTROL XL) 2.5 MG 24 hr tablet Take 1 tablet (2.5 mg) by mouth 2 times daily 6/12/2023 at x 2 Yes Patti Suárez MD Yes    indapamide (LOZOL) 1.25 MG tablet Take 1 tablet (1.25 mg) by mouth every morning 6/12/2023 at am Yes Patti Suárez MD Yes    loperamide (IMODIUM A-D) 2 MG tablet Take 2 mg by mouth 4 times daily as needed for diarrhea More than a month Yes Reported, Patient     meclizine (ANTIVERT) 12.5 MG tablet TAKE 1 TABLET BY MOUTH THREE TIMES DAILY AS NEEDED FOR DIZZINESS More than a month Yes Patti Suárez MD     METAMUCIL FIBER PO Take 2 tablets by mouth  daily 6/12/2023 at am Yes Reported, Patient     sertraline (ZOLOFT) 100 MG tablet Take 1 tablet (100 mg) by mouth daily 6/12/2023 at am Yes Sushant Escobar NP Yes    Vitamin D3 (CHOLECALCIFEROL) 125 MCG (5000 UT) tablet Take 125 mcg by mouth daily 6/12/2023 at am Yes Unknown, Entered By History     warfarin ANTICOAGULANT (COUMADIN) 2.5 MG tablet Take 2.5-3.75 mg by mouth See Admin Instructions 3.75 mg Sunday, Wednesday, Friday  2.5 mg all other days of the week 6/12/2023 at PM Yes Unknown, Entered By History

## 2023-06-14 NOTE — PROGRESS NOTES
"[facility]  OUTPATIENT PHYSICAL THERAPY EVALUATION  PLAN OF TREATMENT FOR OUTPATIENT REHABILITATION  (COMPLETE FOR INITIAL CLAIMS ONLY)  Patient's Last Name, First Name, M.REX.  YOB: 1940  Savanna Rehman                        Provider's Name  [facility] Medical Record No.  [id number]                             Onset Date:  06/13/23   Start of Care Date:  06/14/23   Type:     _X_PT   ___OT   ___SLP Medical Diagnosis:  chest pain, falls, gen weakness              PT Diagnosis:  impaired IND with functional mobility Visits from SOC:  1     See note for plan of treatment, functional goals and certification details    I CERTIFY THE NEED FOR THESE SERVICES FURNISHED UNDER        THIS PLAN OF TREATMENT AND WHILE UNDER MY CARE     (Physician co-signature of this document indicates review and certification of the therapy plan).              06/14/23 1200   Appointment Info   Signing Clinician's Name / Credentials (PT) Cheryl Dixon DPT   Quick Adds   Quick Adds Certification   Living Environment   People in Home alone   Current Living Arrangements independent living facility   Home Accessibility no concerns   Transportation Anticipated family or friend will provide   Self-Care   Usual Activity Tolerance moderate   Current Activity Tolerance fair   Equipment Currently Used at Home walker, rolling;other (see comments);grab bar, tub/shower;grab bar, toilet;shower chair  (scooter)   Fall history within last six months yes   Number of times patient has fallen within last six months 5   Activity/Exercise/Self-Care Comment Pt typically [place] for gait with 4WW<, able to complete ADLs IND, does her own grocery shopping using electric scooter. Since her recent fall, friend has been ordering food for her, but recently she reports \"can't stand to cook it\".  RN assists with medication management.   General Information   Onset of Illness/Injury or Date " "of Surgery 06/13/23   Referring Physician Gretel Caputo PA-C   Patient/Family Therapy Goals Statement (PT) pt would like to go to TCU   Pertinent History of Current Problem (include personal factors and/or comorbidities that impact the POC) \"Savanna Rehman is a 82 year old female with PMHx significant for a fib anticoagulated on coumadin, HTN, HFpEF, DM type II, GERD, and CRISTIANA, who was admitted on 6/13/2023 with chest pain. She also reports a fall ~1 week ago resulting in large hematoma to the R buttocks, low back and upper thigh.\"   Existing Precautions/Restrictions fall   General Observations Dtr present and supportive throughout eval   Cognition   Affect/Mental Status (Cognition) WFL   Orientation Status (Cognition) oriented x 3   Follows Commands (Cognition) WFL   Pain Assessment   Patient Currently in Pain Yes, see Vital Sign flowsheet  (At rest, 6/10)   Integumentary/Edema   Integumentary/Edema Comments very large hematoma on R buttock   Posture    Posture Forward head position   Range of Motion (ROM)   Range of Motion ROM is WFL   Strength (Manual Muscle Testing)   Strength (Manual Muscle Testing) Able to perform R SLR;Able to perform L SLR;Deficits observed during functional mobility   Strength Comments Decreased BLE functional strength, R>L   Bed Mobility   Comment, (Bed Mobility) Ana Cristina with HOB elevated and use of grab bar   Transfers   Transfers sit-stand transfer   Comment, (Transfers) modA at FWW   Gait/Stairs (Locomotion)   Comment, (Gait/Stairs) Pt able to complete 3 lateral steps along EOB with FWW and Ana Cristina   Balance   Balance Comments Falls history, requires FWW and physical assist for safe upright mobility   Sensory Examination   Sensory Perception Comments BLE neuropathy at baseline   Clinical Impression   Criteria for Skilled Therapeutic Intervention Yes, treatment indicated   PT Diagnosis (PT) impaired IND with functional mobility   Influenced by the following impairments decreased " functional strength, balance, activity tolerance, pain   Functional limitations due to impairments impaired bed mobility, transfers, ambulation   Clinical Presentation (PT Evaluation Complexity) Stable/Uncomplicated   Clinical Presentation Rationale Based on current presentation, PMH, social support   Clinical Decision Making (Complexity) low complexity   Planned Therapy Interventions (PT) balance training;bed mobility training;gait training;home exercise program;patient/family education;ROM (range of motion);stair training;strengthening;transfer training;progressive activity/exercise;home program guidelines   Risk & Benefits of therapy have been explained evaluation/treatment results reviewed;care plan/treatment goals reviewed;risks/benefits reviewed;participants voiced agreement with care plan;participants included;current/potential barriers reviewed;patient;daughter   PT Total Evaluation Time   PT Eval, Low Complexity Minutes (76239) 10   Therapy Certification   Start of care date 06/14/23   Certification date from 06/14/23   Certification date to 06/21/23   Medical Diagnosis chest pain, falls, gen weakness   Physical Therapy Goals   PT Frequency 5x/week   PT Predicted Duration/Target Date for Goal Attainment 06/21/23   PT Goals Bed Mobility;Transfers;Gait   PT: Bed Mobility Modified independent;Supine to/from sit   PT: Transfers Modified independent;Sit to/from stand;Bed to/from chair;Assistive device   PT: Gait Modified independent;Assistive device;Rolling walker;100 feet   Interventions   Interventions Quick Adds Therapeutic Activity;Therapeutic Procedure   Therapeutic Procedure/Exercise   Ther. Procedure: strength, endurance, ROM, flexibillity Minutes (75767) 10   Symptoms Noted During/After Treatment fatigue   Treatment Detail/Skilled Intervention Pt cued for LE therex for ROM and muscle strength purposes: AP, QS, GS, SLR. VC and TC for from. Pt encouraged to complete IND   Therapeutic Activity   Therapeutic  Activities: dynamic activities to improve functional performance Minutes (15437) 20   Symptoms Noted During/After Treatment Dizziness;Fatigue   Treatment Detail/Skilled Intervention Pt cued for sup>sit, Ana Cristina. ModA to scoot to EOB. Pt noting dizziness sitting at EOB. /71, . Increased time spent sitting at EOB prior to standing attempts, CGA-Ana Cristina for sitting balance. Pt completed sit<>stand reps x3 at with modA initially, Ana Cristina on later reps. Pt cued for safe hand placement. Able to stand up to ~30s before needing seated rest. Cued for lateral steps along EOB, Ana Cristina. Pt returned supine with Ana Cristina at LEs, able to scoot up in bed with SBA and cues. Time spent answering pt questions within scope. Pt remained supine with alarm armed and all needs in reach.   PT Discharge Planning   PT Plan prgoress bed mob, STS reps, pre-gait/gait as able   PT Discharge Recommendation (DC Rec) Transitional Care Facility   PT Rationale for DC Rec Pt below Celso baseline and has been struggling with mobility and caring for herself. Pt is typically able to ambulate with 4WW, but needing to use electric scooter recently. Pt currently unsafe to return home alone as she requires assistance for all mobility. Pt would benefit from continued skilled PT at TCU to maxmize safety and IND with mobility.   PT Brief overview of current status min-modA sit<>stand at FWW   Total Session Time   Timed Code Treatment Minutes 30   Total Session Time (sum of timed and untimed services) 40

## 2023-06-14 NOTE — PLAN OF CARE
ROOM # 203    Living Situation (if not independent, order SW consult):  Facility name:  : Daughter Roya, pt lives at home alone     Activity level at baseline: Ind  Activity level on admit: Ax1 pivots, pt uses motorized wheelchair at home and stands and pivots to get into her wheelchair but no longer walks    Who will be transporting you at discharge: friend Bel    Patient registered to observation; given Patient Bill of Rights; given the opportunity to ask questions about observation status and their plan of care.  Patient has been oriented to the observation room, bathroom and call light is in place.    Discussed discharge goals and expectations with patient/family.

## 2023-06-14 NOTE — PLAN OF CARE
PRIMARY DIAGNOSIS: ACUTE PAIN/fall  OUTPATIENT/OBSERVATION GOALS TO BE MET BEFORE DISCHARGE:  1. Pain Status: Improved with use of alternative comfort measures i.e.: essential oils    2. Return to near baseline physical activity: No    3. Cleared for discharge by consultants (if involved): No    Discharge Planner Nurse   Safe discharge environment identified: No  Barriers to discharge: Yes       Entered by: Kenya Robert RN 06/14/2023 2:54 PM     Please review provider order for any additional goals.   Nurse to notify provider when observation goals have been met and patient is ready for discharge.Goal Outcome Evaluation:

## 2023-06-14 NOTE — H&P
[facility]    History and Physical - Hospitalist Service       Date of Admission:  6/13/2023    Assessment & Plan      Savanna Rehman is a 82 year old female with PMHx significant for a fib anticoagulated on coumadin, HTN, HFpEF, DM type II, GERD, and CRISTIANA, who was admitted on 6/13/2023 with chest pain. She also reports a fall ~1 week ago resulting in large hematoma to the R buttocks, low back and upper thigh.    1. Chest pain   Suspect musculoskeletal, related to recent fall. Onset of pain at rest this afternoon, denies associated nausea or diaphroesis. Notes that it goes in waves across her chest. Chest wall is tender to palpation, reproducing her sx.   VSS in ED. Initial troponin 25, 25 and 29 on recheck. EKG shows a fib. CT chest is negative for PE, mild cardiomegaly noted with diffuse groundglass opacities, edema vs atelectasis.   - admit to OBS  - low suspicion for cardiac etiology given chest pain is reproducible in setting of recent fall.   - troponins stable  - monitor on tele, otherwise no further cardiac work up needed at this time  - PRN pain control  - IS. Suspect groundglass opacities is atelectasis rather than edema    2. L buttock, thigh and low back hematoma  Multiple falls   Tremor  Due to fall in setting of chronic anticoagulation. Has held coumadin for past several days due to this. Reports multiple falls related to tremor. Unable to ambulate with walker today, suspect due to weakness related to hematoma.   - PT consult. Pt is open to TCU for strengthening. If unable to get to TCU, recommend Home PT/OT  - ice hematoma  - PCP has placed Neurology referral for further work up  3. Acute anemia   Hgb within normal limits prior to recent fall, suspect blood loss due to rather large size of hematoma.   Hgb 14.8 in May-->11.2 three day ago-->9.3 today.   - continue outpatient monitoring  - coumadin on hold. Likely ok to resume on discharge if Hgb stable in AM  4. A fib  "anticoagulated on coumadin  - hold coumadin per above  5. HTN  HFpEF  LE edema  EF 55-60% on last echo in 2020. Mod tricuspid regurg. Edema is fairly new, PCP recently started diuretic  - resume home Indapamide  6. DM type II  Well controlled  - Resume home glipizide  - Glucose checks BID and hs  - Hypoglycemia protocol  9. CRISTIANA  Has CPAP but not compliant       Diet:   regular diet  DVT Prophylaxis: Pneumatic Compression Devices  Aguilar Catheter: Not present  Lines: None     Cardiac Monitoring: None  Code Status:   full code    Clinically Significant Risk Factors Present on Admission               # Drug Induced Coagulation Defect: home medication list includes an anticoagulant medication    # Hypertension: Home medication list includes antihypertensive(s)      # Obesity: Estimated body mass index is 36.64 kg/m  as calculated from the following:    Height as of this encounter: 1.727 m (5' 8\").    Weight as of this encounter: 109.3 kg (241 lb).            Disposition Plan      Expected Discharge Date: 06/14/2023                The patient's care was discussed with the Patient and ED provider, ANNA Cabello.    Gretel Caputo PA-C  Hospitalist Service  [facility]  Securely message with Wordy (more info)  Text page via [facility] Paging/Directory     ______________________________________________________________________    Chief Complaint   Chest pain    History is obtained from the patient    History of Present Illness   Savanna Rehman is a 82 year old female with PMHx significant for a fib anticoagulated on coumadin, HTN, HFpEF, DM type II, GERD, and CRISTIANA, who presents to the ED with right-sided chest pain.  She notes her chest pain started at 125 this afternoon while sitting in her chair texting.  She notes that it travels across her chest in waves.  She feels like she cannot get a deep breath because of the pain.  She does report a fall about 1 week ago where she fell onto her right buttocks " and hip.  She has a large hematoma to this area.  She denies fever, chills, cough, abdominal pain, nausea, vomiting, diarrhea or dysuria.  Her chest wall is tender with palpation.  She also notes a fairly recent onset of tremors which she relates is the cause of her falls.  Family are concerned about her safety at home and would like TCU placement.  In the ED, vital signs are stable.  BMP is fairly unremarkable, creatinine stable at 1.02, glucose 185.  BNP is 1539, troponin slightly elevated at 25.  CBC is fairly unremarkable, hemoglobin is 9.3 which is 2 points lower than it was on 6/10.  Her INR is subtherapeutic but she has been holding her Coumadin.  She will be admitted to observation for further work-up of chest pain.      Past Medical History    Past Medical History:   Diagnosis Date     Anemia     Iron Deficiency anemia     Atrial fibrillation (H)      CAD (coronary artery disease)     non-obstructive     Chronic pain     neck, low back, legs     Congestive heart failure (H)      Degenerative disk disease      Diabetes (H)      Fibromyalgia      Gastro-oesophageal reflux disease      Gout      Hiatal hernia      Mumps      Neuropathy      CRISTIANA (obstructive sleep apnea) - CPAP      Palpitations      Pernicious anemia      Sleep apnea     uses CPAP.     Urinary incontinence      Vitamin D deficiency        Past Surgical History   Past Surgical History:   Procedure Laterality Date     APPENDECTOMY       CHOLECYSTECTOMY       COLONOSCOPY  3/15/2011     COLONOSCOPY N/A 3/15/2023    Procedure: COLONOSCOPY, WITH POLYPECTOMY AND BIOPSY;  Surgeon: Maci Coronel MD;  Location: [facility]     COLONOSCOPY N/A 3/15/2023    Procedure: COLONOSCOPY, FLEXIBLE, WITH LESION REMOVAL USING SNARE;  Surgeon: Maci Coronel MD;  Location: [facility]     CORONARY ANGIOGRAPHY ADULT ORDER       CYSTOSCOPY, BIOPSY BLADDER, COMBINED N/A 7/13/2020    Procedure: CYSTOSCOPY, WITH BLADDER BIOPSY;  Surgeon: Deisy Wilson MD;   Location: [facility]     HEART CATH LEFT HEART CATH  12/30/16    medication management     HYSTERECTOMY TOTAL ABDOMINAL       Knee replacement NOS Left      LAPAROSCOPIC NISSEN FUNDOPLICATION N/A 2/4/2015    Procedure: LAPAROSCOPIC NISSEN FUNDOPLICATION;  Surgeon: Armando Ansari MD;  Location: [facility] OR     TONSILLECTOMY       TRANSPOSITION ULNAR NERVE (ELBOW)         Prior to Admission Medications   Prior to Admission Medications   Prescriptions Last Dose Informant Patient Reported? Taking?   ACE/ARB/ARNI NOT PRESCRIBED (INTENTIONAL)   No No   Sig: Please choose reason not prescribed, below   EPINEPHrine (ANY BX GENERIC EQUIV) 0.3 MG/0.3ML injection 2-pack   No No   Sig: INJECT 0.3MLS (0.3MG) INTO THE MUSCLE ONCE AS NEEDED FOR ANAPHYLAXIS   METAMUCIL FIBER PO   Yes No   blood glucose (NO BRAND SPECIFIED) lancets standard   No No   Sig: Use to test blood sugar  as directed.   blood glucose (NO BRAND SPECIFIED) lancing device   No No   Sig: Device to be used with lancets. Patient requests Photozeenet 2 lancing device to check blood glucose once per day.   blood glucose (ONETOUCH ULTRA) test strip   No No   Sig: Use to test blood sugar 1 times daily   blood glucose calibration (NO BRAND SPECIFIED) solution   No No   Sig: Use to calibrate blood glucose monitor   blood glucose monitoring (NO BRAND SPECIFIED) meter device kit   No No   Sig: Use to test blood sugar 1 time daily   butalbital-aspirin-caffeine (FIORINAL) -40 MG capsule   No No   Sig: TAKE 1 CAPSULE BY MOUTH EVERY 6 HOURS AS NEEDED FOR HEADACHE   glipiZIDE (GLUCOTROL XL) 2.5 MG 24 hr tablet   No No   Sig: Take 1 tablet (2.5 mg) by mouth 2 times daily   indapamide (LOZOL) 1.25 MG tablet   No No   Sig: Take 1 tablet (1.25 mg) by mouth every morning   loperamide (IMODIUM A-D) 2 MG tablet   Yes No   Sig: Take 2 mg by mouth 4 times daily as needed for diarrhea   meclizine (ANTIVERT) 12.5 MG tablet   No No   Sig: TAKE 1 TABLET BY MOUTH THREE TIMES DAILY AS NEEDED  FOR DIZZINESS   sertraline (ZOLOFT) 100 MG tablet   No No   Sig: Take 1 tablet (100 mg) by mouth daily   warfarin ANTICOAGULANT (COUMADIN) 2.5 MG tablet   No No   Sig: TAKE 2.5 mg (2.5 mg x 1) every Tue, Thu, Sat; 3.75 mg (2.5 mg x 1.5) all other days or as directed.      Facility-Administered Medications: None           Physical Exam   Vital Signs: Temp: 97.6  F (36.4  C) Temp src: Oral BP: (!) 140/65 Pulse: 69   Resp: 18 SpO2: 97 % O2 Device: None (Room air)    Weight: 241 lbs 0 oz    GENERAL:  Comfortable.  PSYCH: pleasant, oriented, No acute distress.  HEENT:  Atraumatic, normocephalic. Normal conjunctiva, normal hearing, and oropharynx is normal.  NECK:  Supple, no neck vein distention  HEART:  Normal S1, S2 with no murmur, no pericardial rub, gallops or S3 or S4. Chest wall tender to palpation  LUNGS:  Clear to auscultation, normal Respiratory effort. No wheezing, rales or ronchi.  GI:  Soft, normal bowel sounds. Non-tender, non distended.   EXTREMITIES:  Large hematoma to R buttocks, posterior thigh and low back. No pedal edema, +2 pulses bilateral and equal.  SKIN:  Dry to touch, No rash, wound or ulcerations.  NEUROLOGIC:  CN 2-12 intact, BL 5/5 symmetric upper and lower extremity strength, sensation is intact with no focal deficits.      Medical Decision Making       60 MINUTES SPENT BY ME on the date of service doing chart review, history, exam, documentation & further activities per the note.      Data     I have personally reviewed the following data over the past 24 hrs:    9.0  \   9.3 (L)   / 184     136 97 (L) 18.1 /  185 (H)   3.9 30 (H) 1.02 (H) \       Trop: 29 (H) BNP: 1,539       INR:  1.46 (H) PTT:  N/A   D-dimer:  1.29 (H) Fibrinogen:  N/A       Imaging results reviewed over the past 24 hrs:   Recent Results (from the past 24 hour(s))   CT Chest Pulmonary Embolism w Contrast    Narrative    EXAM: CT CHEST PULMONARY EMBOLISM W CONTRAST  LOCATION: [facility]  DATE:  6/13/2023    INDICATION: right chest pain, shoulder pain, SOB, elevated dimer  COMPARISON: 06/10/2023  TECHNIQUE: CT chest pulmonary angiogram during arterial phase injection of IV contrast. Multiplanar reformats and MIP reconstructions were performed. Dose reduction techniques were used.   CONTRAST: 73mL Isovue 370    FINDINGS:  ANGIOGRAM CHEST: Mild prominence of the central pulmonary arteries which may be seen with underlying pulmonary arterial hypertension. No evidence of pulmonary embolism. Although contrast bolus is limited, there is no evidence of thoracic aortic   dissection. No CT evidence of right heart strain.    LUNGS AND PLEURA: Increased groundglass opacity throughout both lungs, slightly greater in the dependent portions. No pneumothorax.    MEDIASTINUM/AXILLAE: Mild cardiomegaly. No significant mediastinal or hilar adenopathy.    CORONARY ARTERY CALCIFICATION: Mild to moderate    UPPER ABDOMEN: Morphologic changes of cirrhosis. Cholecystectomy. Postoperative changes at the GE junction. Moderate atherosclerotic disease.    MUSCULOSKELETAL: No acute bony abnormalities.      Impression    IMPRESSION:  1.  No evidence of pulmonary embolus.  2.  Mild cardiomegaly, with diffuse mild groundglass opacities, early edema versus atelectasis.  3.  Additional findings as above.

## 2023-06-14 NOTE — PLAN OF CARE
PRIMARY DIAGNOSIS: ACUTE PAIN/fall  OUTPATIENT/OBSERVATION GOALS TO BE MET BEFORE DISCHARGE:  1. Pain Status: Improved with use of alternative comfort measures i.e.: essential oils    2. Return to near baseline physical activity: No    3. Cleared for discharge by consultants (if involved): No    Discharge Planner Nurse   Safe discharge environment identified: No  Barriers to discharge: Yes       Entered by: Kenya Robert RN 06/14/2023 10:02 AM     Please review provider order for any additional goals.   Nurse to notify provider when observation goals have been met and patient is ready for discharge.Goal Outcome Evaluation:

## 2023-06-14 NOTE — CONSULTS
Care Management Initial Consult    General Information  Assessment completed with: Patient, Children, Pt and dtr Roya at bedside  Type of CM/SW Visit: Initial Assessment    Primary Care Provider verified and updated as needed: Yes   Readmission within the last 30 days: no previous admission in last 30 days      Reason for Consult: discharge planning  Advance Care Planning:            Communication Assessment  Patient's communication style: spoken language (English or Bilingual)    Hearing Difficulty or Deaf: no      Cognitive  Cognitive/Neuro/Behavioral: WDL  Level of Consciousness: alert  Arousal Level: opens eyes spontaneously  Orientation: oriented x 4     Best Language: 0 - No aphasia  Speech: clear, spontaneous    Living Environment:   People in home: alone     Current living Arrangements: apartment      Able to return to prior arrangements: yes     Family/Social Support:  Care provided by: self  Provides care for: no one  Marital Status:   , Children          Description of Support System: Supportive, Involved    Support Assessment: Adequate family and caregiver support    Current Resources:   Patient receiving home care services: Yes  Skilled Home Care Services: Skilled Nursing  Community Resources: County Worker, Transportation Services, County Programs, Housekeeping/Chore Agency  Equipment currently used at home: walker, rolling, other (see comments), grab bar, tub/shower, grab bar, toilet, shower chair  Supplies currently used at home: Incontinence Supplies    Lifestyle & Psychosocial Needs:  Social Determinants of Health     Tobacco Use: Medium Risk (6/2/2023)    Patient History      Smoking Tobacco Use: Former      Smokeless Tobacco Use: Never      Passive Exposure: Past   Alcohol Use: Not At Risk (1/27/2022)    AUDIT-C      Frequency of Alcohol Consumption: Monthly or less      Average Number of Drinks: 1 or 2      Frequency of Binge Drinking: Never   Financial Resource Strain: Medium  Risk (1/27/2022)    Overall Financial Resource Strain (CARDIA)      Difficulty of Paying Living Expenses: Somewhat hard   Food Insecurity: No Food Insecurity (1/27/2022)    Hunger Vital Sign      Worried About Running Out of Food in the Last Year: Never true      Ran Out of Food in the Last Year: Never true   Transportation Needs: Unmet Transportation Needs (1/27/2022)    PRAPARE - Transportation      Lack of Transportation (Medical): Yes      Lack of Transportation (Non-Medical): Yes   Physical Activity: Not on file   Stress: Stress Concern Present (1/27/2022)    [place] citizen [place] of Occupational [employer] - Occupational Stress Questionnaire      Feeling of Stress : Very much   Social Connections: Not on file   Intimate Partner Violence: Not At Risk (1/27/2022)    Humiliation, Afraid, Rape, and Kick questionnaire      Fear of Current or Ex-Partner: No      Emotionally Abused: No      Physically Abused: No      Sexually Abused: No   Depression: Not at risk (5/12/2023)    PHQ-2      PHQ-2 Score: 0   Recent Concern: Depression - At risk (3/22/2023)    PHQ-2      PHQ-2 Score: 3   Housing Stability: Low Risk  (1/27/2022)    Housing Stability Vital Sign      Unable to Pay for Housing in the Last Year: No      Number of Places Lived in the Last Year: 1      Unstable Housing in the Last Year: No     Functional Status:  Prior to admission patient needed assistance:   Dependent ADLs:: Ambulation-walker  Dependent IADLs:: Cleaning, Cooking, Laundry, Shopping, Meal Preparation, Transportation  Assesssment of Functional Status: Needs placement in a SNF/TCF for rehabilitation    Additional Information:  CM consulted for discharge planning, met with pt and tyrone Pryor at bedside to discuss. Pt lives in an apartment and is independent with 4WW at baseline, she uses a mobility scooter for longer distances. She has a home RN through [facility] who helps with medication management. Family assists with transportation.     Reviewed PT  recommendations for TCU at discharge, she is agreeable to this plan and would like referrals sent for a shared room to [facility], [facility] and [facility]. Referrals sent, awaiting responses. Pt and dtr report that family will transport at discharge.     Latia Gao RN BSN   Inpatient Care Coordination  [facility]   Phone [phone]

## 2023-06-14 NOTE — PLAN OF CARE
[facility]    ED Boarding Nurse Handoff Addendum Report:    Date/time: 6/13/2023, 8:45 PM    Activity Level: standby - not oob for this RN- reported to be SBA  Fall Risk: Yes:  assistive device/personal items within reach    Active Infusions: none    Current Meds Due: awaiting med verification, orders released    Current care needs: none    Oxygen requirements (liters/min and/or FiO2): None    Respiratory status: Room air    Vital signs (within last 30 minutes):    Vitals:    06/13/23 1807 06/13/23 1822 06/13/23 1837 06/13/23 1852   BP:       Pulse: 68 69 68 69   Resp:       Temp:       TempSrc:       SpO2:       Weight:       Height:           Focused assessment within last 30 minutes:    Bruising noted on patient back side, mostly on R buttocks and lower back spreading to R buttocks from previous fall at home. Lungs WDL. Reporting improvement in chest pain.    ED Boarding Nurse name: Chandra Andujar RN

## 2023-06-14 NOTE — PLAN OF CARE
Temp: 98.4  F (36.9  C) Temp src: Axillary BP: 135/75 Pulse: 76   Resp: 20 SpO2: 93 % O2 Device: None (Room air)       A&Ox4. Up Ax1 with walker and gait belt, pivots to bedside commode. Incontinent of urine, pt refusing purewick for overnight. Left arm/right buttock/right thigh bruised, pt refusing ice pack, scheduled tylenol/voltaren gel applied. Pts chest discomfort prevents her from taking a deep breath, IS given, educated pt how to correctly do the IS, pt demonstrated proper use. Plan- labs in am, monitor on tele, PT/SW consults, pain control, encourage IS use, ice pack to hematoma if pt agreeable, holding coumadin tonight- likely to resume at discharge.

## 2023-06-14 NOTE — PLAN OF CARE
PRIMARY DIAGNOSIS: CHEST PAIN  OUTPATIENT/OBSERVATION GOALS TO BE MET BEFORE DISCHARGE:  1. Ruled out acute coronary syndrome (negative or stable Troponin):  Yes  2. Pain Status: Improved-controlled with oral pain medications.  3. Appropriate provocative testing performed: Yes  - Interpretation of cardiac rhythm per telemetry tech: A-fib controlled 70s      4. Cleared by Consultants (if applicable):N/A  5. Return to near baseline physical activity: Yes  Discharge Planner Nurse   Safe discharge environment identified: No  Barriers to discharge: Yes       Entered by: Trinh Palmer RN 06/14/2023 1:13 AM     Please review provider order for any additional goals.   Nurse to notify provider when observation goals have been met and patient is ready for discharge.    Vitals are Temp: 98.4  F (36.9  C) Temp src: Oral BP: 121/46 Pulse: 76   Resp: 18 SpO2: 95 %.  Patient is Alert and Oriented x4. They are 1 Assist pivot. Uses motorized w/c at home. Pt is a Regular diet.  They are complaining of 3/10 pain in their chest and R buttock area.  Non-pharmacological interventions offered are patient was repositioned.  Patient is Saline locked. Incontinent of bladder - refusing use of Purewick. IS at bedside, patient was able to use with minimal instruction. On tele. PT and SW to consult.

## 2023-06-14 NOTE — PLAN OF CARE
Goal Outcome Evaluation:PRIMARY DIAGNOSIS: ACUTE PAIN/ Fall  OUTPATIENT/OBSERVATION GOALS TO BE MET BEFORE DISCHARGE:  1. Pain Status: Improved with use of alternative comfort measures i.e.: essential oils    2. Return to near baseline physical activity: Yes    3. Cleared for discharge by consultants (if involved): No    Discharge Planner Nurse   Safe discharge environment identified: No  Barriers to discharge: Yes       Entered by: Kenya Robert RN 06/14/2023 5:49 PM     Please review provider order for any additional goals.   Nurse to notify provider when observation goals have been met and patient is ready for discharge.           Pt having some dizziness today and was started on meclizine po with some relief.Pt is noted to have chronic dizziness.Pt getting good pain relief to chest and upper back with valerian cream. Pt is working with pt needs encouragement to move more. SW looking for tcu placement.

## 2023-06-15 ENCOUNTER — APPOINTMENT (OUTPATIENT)
Dept: PHYSICAL THERAPY | Facility: CLINIC | Age: 83
End: 2023-06-15
Payer: COMMERCIAL

## 2023-06-15 ENCOUNTER — TELEPHONE (OUTPATIENT)
Dept: INTERNAL MEDICINE | Facility: CLINIC | Age: 83
End: 2023-06-15

## 2023-06-15 LAB
ANION GAP SERPL CALCULATED.3IONS-SCNC: 8 MMOL/L (ref 7–15)
BASOPHILS # BLD AUTO: 0 10E3/UL (ref 0–0.2)
BASOPHILS NFR BLD AUTO: 1 %
BUN SERPL-MCNC: 16.6 MG/DL (ref 8–23)
CALCIUM SERPL-MCNC: 8.8 MG/DL (ref 8.8–10.2)
CHLORIDE SERPL-SCNC: 101 MMOL/L (ref 98–107)
CREAT SERPL-MCNC: 0.91 MG/DL (ref 0.51–0.95)
DEPRECATED HCO3 PLAS-SCNC: 29 MMOL/L (ref 22–29)
EOSINOPHIL # BLD AUTO: 0.4 10E3/UL (ref 0–0.7)
EOSINOPHIL NFR BLD AUTO: 6 %
ERYTHROCYTE [DISTWIDTH] IN BLOOD BY AUTOMATED COUNT: 15.5 % (ref 10–15)
GFR SERPL CREATININE-BSD FRML MDRD: 63 ML/MIN/1.73M2
GLUCOSE BLDC GLUCOMTR-MCNC: 134 MG/DL (ref 70–99)
GLUCOSE BLDC GLUCOMTR-MCNC: 163 MG/DL (ref 70–99)
GLUCOSE BLDC GLUCOMTR-MCNC: 169 MG/DL (ref 70–99)
GLUCOSE SERPL-MCNC: 129 MG/DL (ref 70–99)
HCT VFR BLD AUTO: 28.9 % (ref 35–47)
HGB BLD-MCNC: 9.2 G/DL (ref 11.7–15.7)
IMM GRANULOCYTES # BLD: 0.1 10E3/UL
IMM GRANULOCYTES NFR BLD: 1 %
INR PPP: 1.3 (ref 0.85–1.15)
LYMPHOCYTES # BLD AUTO: 1.3 10E3/UL (ref 0.8–5.3)
LYMPHOCYTES NFR BLD AUTO: 21 %
MCH RBC QN AUTO: 30.3 PG (ref 26.5–33)
MCHC RBC AUTO-ENTMCNC: 31.8 G/DL (ref 31.5–36.5)
MCV RBC AUTO: 95 FL (ref 78–100)
MONOCYTES # BLD AUTO: 0.9 10E3/UL (ref 0–1.3)
MONOCYTES NFR BLD AUTO: 15 %
NEUTROPHILS # BLD AUTO: 3.6 10E3/UL (ref 1.6–8.3)
NEUTROPHILS NFR BLD AUTO: 56 %
NRBC # BLD AUTO: 0 10E3/UL
NRBC BLD AUTO-RTO: 0 /100
PLATELET # BLD AUTO: 193 10E3/UL (ref 150–450)
POTASSIUM SERPL-SCNC: 3.9 MMOL/L (ref 3.4–5.3)
RBC # BLD AUTO: 3.04 10E6/UL (ref 3.8–5.2)
SODIUM SERPL-SCNC: 138 MMOL/L (ref 136–145)
WBC # BLD AUTO: 6.4 10E3/UL (ref 4–11)

## 2023-06-15 PROCEDURE — 250N000013 HC RX MED GY IP 250 OP 250 PS 637: Performed by: HOSPITALIST

## 2023-06-15 PROCEDURE — G0378 HOSPITAL OBSERVATION PER HR: HCPCS

## 2023-06-15 PROCEDURE — 82962 GLUCOSE BLOOD TEST: CPT

## 2023-06-15 PROCEDURE — 80048 BASIC METABOLIC PNL TOTAL CA: CPT | Performed by: HOSPITALIST

## 2023-06-15 PROCEDURE — 85610 PROTHROMBIN TIME: CPT | Performed by: EMERGENCY MEDICINE

## 2023-06-15 PROCEDURE — 36415 COLL VENOUS BLD VENIPUNCTURE: CPT | Performed by: HOSPITALIST

## 2023-06-15 PROCEDURE — 97116 GAIT TRAINING THERAPY: CPT | Mod: GP | Performed by: PHYSICAL THERAPIST

## 2023-06-15 PROCEDURE — 99232 SBSQ HOSP IP/OBS MODERATE 35: CPT | Performed by: HOSPITALIST

## 2023-06-15 PROCEDURE — 97530 THERAPEUTIC ACTIVITIES: CPT | Mod: GP | Performed by: PHYSICAL THERAPIST

## 2023-06-15 PROCEDURE — 85025 COMPLETE CBC W/AUTO DIFF WBC: CPT | Performed by: HOSPITALIST

## 2023-06-15 PROCEDURE — 250N000013 HC RX MED GY IP 250 OP 250 PS 637: Performed by: PHYSICIAN ASSISTANT

## 2023-06-15 PROCEDURE — 36415 COLL VENOUS BLD VENIPUNCTURE: CPT | Performed by: EMERGENCY MEDICINE

## 2023-06-15 RX ORDER — POLYETHYLENE GLYCOL 3350 17 G/17G
17 POWDER, FOR SOLUTION ORAL DAILY PRN
Status: ON HOLD | DISCHARGE
Start: 2023-06-15 | End: 2023-09-21

## 2023-06-15 RX ORDER — AMOXICILLIN 250 MG
1 CAPSULE ORAL 2 TIMES DAILY PRN
DISCHARGE
Start: 2023-06-15 | End: 2023-06-29 | Stop reason: DRUGHIGH

## 2023-06-15 RX ORDER — WARFARIN SODIUM 2.5 MG/1
2.5 TABLET ORAL
Status: COMPLETED | OUTPATIENT
Start: 2023-06-15 | End: 2023-06-15

## 2023-06-15 RX ORDER — ACETAMINOPHEN 325 MG/1
975 TABLET ORAL EVERY 8 HOURS PRN
Status: ON HOLD | DISCHARGE
Start: 2023-06-15 | End: 2023-09-21

## 2023-06-15 RX ADMIN — GLIPIZIDE 2.5 MG: 2.5 TABLET, FILM COATED, EXTENDED RELEASE ORAL at 19:45

## 2023-06-15 RX ADMIN — MECLIZINE 12.5 MG: 12.5 TABLET ORAL at 19:45

## 2023-06-15 RX ADMIN — INDAPAMIDE 1.25 MG: 1.25 TABLET, FILM COATED ORAL at 08:07

## 2023-06-15 RX ADMIN — ACETAMINOPHEN 975 MG: 325 TABLET, FILM COATED ORAL at 08:07

## 2023-06-15 RX ADMIN — SERTRALINE HYDROCHLORIDE 100 MG: 100 TABLET ORAL at 08:07

## 2023-06-15 RX ADMIN — MECLIZINE 12.5 MG: 12.5 TABLET ORAL at 15:28

## 2023-06-15 RX ADMIN — GLIPIZIDE 2.5 MG: 2.5 TABLET, FILM COATED, EXTENDED RELEASE ORAL at 08:07

## 2023-06-15 RX ADMIN — FAMOTIDINE 20 MG: 20 TABLET, FILM COATED ORAL at 08:07

## 2023-06-15 RX ADMIN — ACETAMINOPHEN 975 MG: 325 TABLET, FILM COATED ORAL at 19:46

## 2023-06-15 RX ADMIN — MECLIZINE 12.5 MG: 12.5 TABLET ORAL at 08:07

## 2023-06-15 RX ADMIN — WARFARIN SODIUM 2.5 MG: 2.5 TABLET ORAL at 17:39

## 2023-06-15 RX ADMIN — DICLOFENAC SODIUM 2 G: 10 GEL TOPICAL at 14:04

## 2023-06-15 RX ADMIN — DICLOFENAC SODIUM 2 G: 10 GEL TOPICAL at 15:30

## 2023-06-15 ASSESSMENT — ACTIVITIES OF DAILY LIVING (ADL)
ADLS_ACUITY_SCORE: 39
ADLS_ACUITY_SCORE: 43
ADLS_ACUITY_SCORE: 43
ADLS_ACUITY_SCORE: 39
ADLS_ACUITY_SCORE: 43
ADLS_ACUITY_SCORE: 39
ADLS_ACUITY_SCORE: 43
ADLS_ACUITY_SCORE: 39
ADLS_ACUITY_SCORE: 43
ADLS_ACUITY_SCORE: 43

## 2023-06-15 NOTE — PLAN OF CARE
PRIMARY DIAGNOSIS: CHEST PAIN/Fall  OUTPATIENT/OBSERVATION GOALS TO BE MET BEFORE DISCHARGE:  1. Ruled out acute coronary syndrome (negative or stable Troponin):  Yes  2. Pain Status: Improved with use of alternative comfort measures i.e.: essential oils  3. Appropriate provocative testing performed: N/A  - Interpretation of cardiac rhythm per telemetry tech: a fib    4. Cleared by Consultants (if applicable):Yes  5. Return to near baseline physical activity: No  Discharge Planner Nurse   Safe discharge environment identified: Yes  Barriers to discharge: Yes       Entered by: Kenya Robert RN 06/15/2023 11:45 AM     Please review provider order for any additional goals.   Nurse to notify provider when observation goals have been met and patient is ready for discharge.Goal Outcome Evaluation:         Plan for tcu possible tomorrow. Pt up to chair for meals today tolerated well.

## 2023-06-15 NOTE — PLAN OF CARE
Pt has had 2.1 and 2.0 second pause today, pt asymptomatic will continue to monitor. VSS Provider notified.

## 2023-06-15 NOTE — PROGRESS NOTES
Care Management Follow Up    Length of Stay (days): 0    Expected Discharge Date: 06/16/2023     Concerns to be Addressed: discharge planning     Patient plan of care discussed at interdisciplinary rounds: Yes    Anticipated Discharge Disposition: Transitional Care     Anticipated Discharge Services: None  Anticipated Discharge DME: None    Patient/family educated on Medicare website which has current facility and service quality ratings: yes  Education Provided on the Discharge Plan: Yes  Patient/Family in Agreement with the Plan: yes    Referrals Placed by CM/SW: Post Acute Facilities    Additional Information:  CM following for discharge planning, pt was accepted to [facility] TCU, they only have private rooms available which is waived for the first 7 days and then would be $36/day after that. Updated pt at bedside with friend Bel via speaker phone, LM for pt's dtr Roya as well. Pt is considering this but is not sure if this is financially feasible for her at this time and would like additional referrals sent, referrals sent, awaiting responses.     [facility] could accept on Monday 6/19 but hopeful for discharge to alternate before then as pt is medically ready at this time.     Update 1630: Pt's dtr called back, they would like to accept the bed at [facility] and are ok with the private room fee. Pt will see if she has a friend available to transport her and let CM know in the AM. Called and LM updating [facility] admissions.     Latia Gao RN BSN   Inpatient Care Coordination  [facility]   Phone [phone]

## 2023-06-15 NOTE — PROGRESS NOTES
"[facility]    Medicine Progress Note - Hospitalist Service    Date of Admission:  6/13/2023    Assessment & Plan   Savanna Rehman is a 82 year old female with PMHx significant for a fib anticoagulated on coumadin, HTN, HFpEF, DM type II, GERD, and CRISTIANA, who was admitted on 6/13/2023 with chest pain. She also reports a fall ~1 week ago resulting in large hematoma to the R buttocks, low back and upper thigh.  Waiting for TCU bed    Chest pain     - suspect musculoskeletal, related to recent fall    - CT PE done: neg for PE    - pain has improved with diclofenac    - no further cardiac work-up      Dizziness    - she describes as \"whooshing\"    - has been chronic    - was given meclizine by her PCP, but she never tried it    - will trial meclizine    Frequent falls    - patient is deconditioned    - lives in 55+ independent living    - uses a scooter for distances    - falls likely multifactorial: deconditioning, obesity, dizziness, tremor    - PT to see, patient will need TCU    - already has outpatient Neuro referral     L buttock, thigh and low back hematoma    - had a fall > 1week ago    - was seen in the ED    - has hematoma    Acute anemia     - Hgb within normal limits prior to recent fall, suspect blood loss due to rather large size of hematoma.     - Hgb 14.8 in May>>11.2 1 week ago>> 9.3 on admission>> 8.3 today    - has had brown stool    - likely due to hematoma    - monitor    - coumadin resuming    A fib anticoagulated on coumadin    - will slowly resume coumadin (2.5mg x 1 tonight)    - not on rate controlling medications    HTN  HFpEF  LE edema    - EF 55-60% on last echo in 2020. Mod tricuspid regurg.    - cont Indapamide    DM type II    - resumed home glipizide    - glucose checks BID and hs    - hypoglycemia protocol    CRISTIANA    - has CPAP but not compliant     Friend in room. Questions answered       Diet: Moderate Consistent Carb (60 g CHO per Meal) Diet  Diet    DVT " "Prophylaxis: INR was 1.4 on 6/13. If Hb stable in am, resume coumadin  Aguilar Catheter: Not present  Lines: None     Cardiac Monitoring: ACTIVE order. Indication: Chest pain/ ACS rule out (24 hours)  Code Status: Full Code      Clinically Significant Risk Factors Present on Admission               # Drug Induced Coagulation Defect: home medication list includes an anticoagulant medication    # Hypertension: Home medication list includes antihypertensive(s)      # Obesity: Estimated body mass index is 37.81 kg/m  as calculated from the following:    Height as of this encounter: 1.727 m (5' 8\").    Weight as of this encounter: 112.8 kg (248 lb 10.9 oz).            Disposition Plan      Expected Discharge Date: 06/16/2023    Discharge Delays: Placement - TCU  *Medically Ready for Discharge  Destination: inpatient rehabilitation facility            Hardik Doshi MD  Hospitalist Service  [facility]  Securely message with [employer] (more info)  Text page via [employer] Paging/Directory   ______________________________________________________________________    Interval History   Patient in room with friend. No new complaints. Doing well. Pain better. Eating and drinking.   Physical Exam   Vital Signs: Temp: 97.6  F (36.4  C) Temp src: Oral BP: 108/64 Pulse: 69   Resp: 18 SpO2: 94 % O2 Device: None (Room air)    Weight: 248 lbs 10.86 oz    Constitutional: awake, alert, cooperative, no apparent distress, and appears stated age  Eyes: Lids and lashes normal, pupils equal, round and reactive to light, extra ocular muscles intact, sclera clear, conjunctiva normal  ENT: Normocephalic, without obvious abnormality, atraumatic, sinuses nontender on palpation, external ears without lesions, oral pharynx with moist mucous membranes, tonsils without erythema or exudates, gums normal and good dentition.  Respiratory: No increased work of breathing, good air exchange, clear to auscultation bilaterally, no crackles or " wheezing  Cardiovascular: Normal apical impulse, regular rate and rhythm, normal S1 and S2, no S3 or S4, and no murmur noted  GI: No scars, normal bowel sounds, soft, non-distended, non-tender, no masses palpated, no hepatosplenomegally  Skin: large ecchymosis over right buttock/thigh    Medical Decision Making     45 MINUTES SPENT BY ME on the date of service doing chart review, history, exam, documentation & further activities per the note.      Data     I have personally reviewed the following data over the past 24 hrs:    6.4  \   9.2 (L)   / 193     138 101 16.6 /  134 (H)   3.9 29 0.91 \       Imaging results reviewed over the past 24 hrs:   No results found for this or any previous visit (from the past 24 hour(s)).

## 2023-06-15 NOTE — UTILIZATION REVIEW
Concurrent stay review; Secondary Review Determination - [facility]        Under the authority of the Utilization Management Committee, the utilization review process indicated a secondary review on the above patient.  The review outcome is based on review of the medical records, discussions with staff, and applying clinical experience noted on the date of the review.        (x) Observation/outpatient Status Appropriate - Concurrent stay review       RATIONALE FOR DETERMINATION: 82-year-old female with history of diabetes, atrial fibrillation on anticoagulation, chronic kidney disease stage III, chronic lightheadedness, increased falls with most recent fall 6/5/2023 resulting in the ER visit on Rosanna 10 which revealed a large right buttock hematoma.  Patient also noted to have cirrhotic appearing liver on CT imaging.  Patient at baseline uses a 4 wheeled walker for walking and a scooter for distances.  She is no longer able to stand and balance herself to prepare meals.  She was considered unsafe at home and thus brought into hospital with some complaints of chest discomfort that is felt to be musculoskeletal from her recent falls.  No new inpatient medical problems identified during her observation care, patient requires a TCU and is awaiting placement.    Patient delayed discharge is related to disposition, there is no medical necessity for inpatient admission at the time of this review. If there is a change in patient status, please resend for review.    The information on this document is developed by the utilization review team in order for the business office to ensure compliance.  This only denotes the appropriateness of proper admission status and does not reflect the quality of care rendered.       The definitions of Inpatient Status and Observation Status used in making the determination above are those provided in the CMS Coverage Manual, Chapter 1 and Chapter 6, section  70.4.       Sincerely,    Yaw Solis MD, MD

## 2023-06-15 NOTE — TELEPHONE ENCOUNTER
Patient calls and wants to let  Know that she will be transferring from the [facility] to [facility] in  [place]

## 2023-06-15 NOTE — PLAN OF CARE
PRIMARY DIAGNOSIS: CHEST PAIN  OUTPATIENT/OBSERVATION GOALS TO BE MET BEFORE DISCHARGE:  1. Ruled out acute coronary syndrome (negative or stable Troponin):  Yes  2. Pain Status: Improved-controlled with oral pain medications.  3. Appropriate provocative testing performed: Yes  - Interpretation of cardiac rhythm per telemetry tech: A-fib controlled 71     4. Cleared by Consultants (if applicable):N/A  5. Return to near baseline physical activity: Yes  Discharge Planner Nurse   Safe discharge environment identified: No  Barriers to discharge: Yes       Entered by: Trinh Palmer RN 06/15/2023 4:24 AM     Please review provider order for any additional goals.   Nurse to notify provider when observation goals have been met and patient is ready for discharge.    Vitals are Temp: 97.5  F (36.4  C) Temp src: Oral BP: 109/82 Pulse: 69   Resp: 16 SpO2: 91 %.  Patient is Alert and Oriented x4. They are 1-2 Assist w/ walker and gait belt. Uses motorized w/c at home. Pt is a Regular diet.  Denying pain. Patient is Saline locked. IS at bedside. Incontinent of bladder. On tele. PT and SW following. PT recommending TCU placement. Referrals pending to multiple facilities.

## 2023-06-15 NOTE — PLAN OF CARE
PRIMARY DIAGNOSIS: CHEST PAIN  OUTPATIENT/OBSERVATION GOALS TO BE MET BEFORE DISCHARGE:  1. Ruled out acute coronary syndrome (negative or stable Troponin):  Yes  2. Pain Status: Improved-controlled with oral pain medications.  3. Appropriate provocative testing performed: Yes  - Interpretation of cardiac rhythm per telemetry tech: A-fib controlled 71     4. Cleared by Consultants (if applicable):N/A  5. Return to near baseline physical activity: Yes  Discharge Planner Nurse   Safe discharge environment identified: No  Barriers to discharge: Yes       Entered by: Trinh Palmer RN 06/15/2023 1:01 AM     Please review provider order for any additional goals.   Nurse to notify provider when observation goals have been met and patient is ready for discharge.    Vitals are Temp: 97.5  F (36.4  C) Temp src: Oral BP: 105/72 Pulse: 75   Resp: 16 SpO2: 94 %.  Patient is Alert and Oriented x4. They are 1-2 Assist w/ walker and gait belt. Uses motorized w/c at home. Pt is a Regular diet.  Denying pain. Patient is Saline locked. IS at bedside. Incontinent of bladder. On tele. PT and SW following. PT recommending TCU placement. Referrals pending to multiple facilities.

## 2023-06-15 NOTE — PROGRESS NOTES
Care Management Follow Up    Length of Stay (days): 0    Expected Discharge Date: 06/15/2023     Concerns to be Addressed: discharge planning     Patient plan of care discussed at interdisciplinary rounds: Yes    Anticipated Discharge Disposition: Transitional Care     Patient/family educated on Medicare website which has current facility and service quality ratings: yes  Education Provided on the Discharge Plan: Yes  Patient/Family in Agreement with the Plan: yes    Referrals Placed by CM/SW: Post Acute Facilities    Additional Information:  CM following for discharge planning, no accepting TCU at this time. Messages have been left with pending TCUs. Awaiting responses.     Latia Gao RN BSN   Inpatient Care Coordination  [facility]   Phone [phone]

## 2023-06-15 NOTE — PLAN OF CARE
PRIMARY DIAGNOSIS: Chest Pain/ Fall   OUTPATIENT/OBSERVATION GOALS TO BE MET BEFORE DISCHARGE:  ADLs back to baseline: No    Activity and level of assistance: pivots, Ax1 with walker and gait belt to bedside commode     Pain status: Improved-controlled with oral pain medications.    Return to near baseline physical activity: No     Discharge Planner Nurse   Safe discharge environment identified: No  Barriers to discharge: Yes       Entered by: Angeles Sinclair RN 06/14/2023 8:35 PM     Please review provider order for any additional goals.   Nurse to notify provider when observation goals have been met and patient is ready for discharge.    Temp: 98.1  F (36.7  C) Temp src: Oral BP: 130/62 Pulse: 59   Resp: 16 SpO2: 94 % O2 Device: None (Room air)       A&Ox4. Very tired this evening- per pt she did not sleep well last night. Complains of chest/RLE pain due to hematoma, pt refusing ice pack, scheduled voltaren gel applied and scheduled tylenol given. Per pt she feels constipated, prn senna 2 tabs given, pt refused miralax. Incontinent of urine. Plan- monitor on tele, scheduled meclizine started today 3x daily, PT recommending TCU- SW following for discharge planning- referrals sent today, holding coumadin, encouraging IS, recheck labs in am.

## 2023-06-16 ENCOUNTER — DOCUMENTATION ONLY (OUTPATIENT)
Dept: ANTICOAGULATION | Facility: CLINIC | Age: 83
End: 2023-06-16
Payer: COMMERCIAL

## 2023-06-16 ENCOUNTER — LAB REQUISITION (OUTPATIENT)
Dept: LAB | Facility: CLINIC | Age: 83
End: 2023-06-16
Payer: COMMERCIAL

## 2023-06-16 VITALS
HEIGHT: 68 IN | HEART RATE: 70 BPM | WEIGHT: 248.68 LBS | RESPIRATION RATE: 18 BRPM | OXYGEN SATURATION: 97 % | SYSTOLIC BLOOD PRESSURE: 166 MMHG | BODY MASS INDEX: 37.69 KG/M2 | DIASTOLIC BLOOD PRESSURE: 72 MMHG | TEMPERATURE: 98.3 F

## 2023-06-16 DIAGNOSIS — I48.21 PERMANENT ATRIAL FIBRILLATION (H): Primary | ICD-10-CM

## 2023-06-16 DIAGNOSIS — I48.20 CHRONIC ATRIAL FIBRILLATION (H): ICD-10-CM

## 2023-06-16 DIAGNOSIS — Z79.01 LONG TERM (CURRENT) USE OF ANTICOAGULANTS: ICD-10-CM

## 2023-06-16 DIAGNOSIS — D64.9 ANEMIA, UNSPECIFIED: ICD-10-CM

## 2023-06-16 LAB
BASOPHILS # BLD AUTO: 0 10E3/UL (ref 0–0.2)
BASOPHILS NFR BLD AUTO: 1 %
EOSINOPHIL # BLD AUTO: 0.4 10E3/UL (ref 0–0.7)
EOSINOPHIL NFR BLD AUTO: 6 %
ERYTHROCYTE [DISTWIDTH] IN BLOOD BY AUTOMATED COUNT: 16 % (ref 10–15)
GLUCOSE BLDC GLUCOMTR-MCNC: 107 MG/DL (ref 70–99)
HCT VFR BLD AUTO: 27.6 % (ref 35–47)
HGB BLD-MCNC: 9 G/DL (ref 11.7–15.7)
IMM GRANULOCYTES # BLD: 0.1 10E3/UL
IMM GRANULOCYTES NFR BLD: 1 %
INR PPP: 1.24 (ref 0.85–1.15)
LYMPHOCYTES # BLD AUTO: 1.4 10E3/UL (ref 0.8–5.3)
LYMPHOCYTES NFR BLD AUTO: 23 %
MCH RBC QN AUTO: 31.5 PG (ref 26.5–33)
MCHC RBC AUTO-ENTMCNC: 32.6 G/DL (ref 31.5–36.5)
MCV RBC AUTO: 97 FL (ref 78–100)
MONOCYTES # BLD AUTO: 0.8 10E3/UL (ref 0–1.3)
MONOCYTES NFR BLD AUTO: 14 %
NEUTROPHILS # BLD AUTO: 3.4 10E3/UL (ref 1.6–8.3)
NEUTROPHILS NFR BLD AUTO: 55 %
NRBC # BLD AUTO: 0 10E3/UL
NRBC BLD AUTO-RTO: 0 /100
PLATELET # BLD AUTO: 192 10E3/UL (ref 150–450)
RBC # BLD AUTO: 2.86 10E6/UL (ref 3.8–5.2)
WBC # BLD AUTO: 6.1 10E3/UL (ref 4–11)

## 2023-06-16 PROCEDURE — 250N000013 HC RX MED GY IP 250 OP 250 PS 637: Performed by: PHYSICIAN ASSISTANT

## 2023-06-16 PROCEDURE — 99239 HOSP IP/OBS DSCHRG MGMT >30: CPT | Performed by: HOSPITALIST

## 2023-06-16 PROCEDURE — 82962 GLUCOSE BLOOD TEST: CPT

## 2023-06-16 PROCEDURE — 85610 PROTHROMBIN TIME: CPT | Performed by: HOSPITALIST

## 2023-06-16 PROCEDURE — 85025 COMPLETE CBC W/AUTO DIFF WBC: CPT | Performed by: HOSPITALIST

## 2023-06-16 PROCEDURE — 36415 COLL VENOUS BLD VENIPUNCTURE: CPT | Performed by: HOSPITALIST

## 2023-06-16 PROCEDURE — G0378 HOSPITAL OBSERVATION PER HR: HCPCS

## 2023-06-16 PROCEDURE — 250N000013 HC RX MED GY IP 250 OP 250 PS 637: Performed by: HOSPITALIST

## 2023-06-16 RX ADMIN — SERTRALINE HYDROCHLORIDE 100 MG: 100 TABLET ORAL at 08:56

## 2023-06-16 RX ADMIN — MECLIZINE 12.5 MG: 12.5 TABLET ORAL at 08:56

## 2023-06-16 RX ADMIN — FAMOTIDINE 20 MG: 20 TABLET, FILM COATED ORAL at 08:56

## 2023-06-16 RX ADMIN — INDAPAMIDE 1.25 MG: 1.25 TABLET, FILM COATED ORAL at 08:56

## 2023-06-16 RX ADMIN — GLIPIZIDE 2.5 MG: 2.5 TABLET, FILM COATED, EXTENDED RELEASE ORAL at 08:56

## 2023-06-16 RX ADMIN — ACETAMINOPHEN 975 MG: 325 TABLET, FILM COATED ORAL at 08:55

## 2023-06-16 ASSESSMENT — ACTIVITIES OF DAILY LIVING (ADL)
ADLS_ACUITY_SCORE: 39

## 2023-06-16 NOTE — PLAN OF CARE
"PRIMARY DIAGNOSIS: GENERALIZED WEAKNESS    OUTPATIENT/OBSERVATION GOALS TO BE MET BEFORE DISCHARGE  1. Orthostatic performed: No    2. Tolerating PO medications: Yes    3. Return to near baseline physical activity: No    4. Cleared for discharge by consultants (if involved): Yes    Discharge Planner Nurse   Safe discharge environment identified: Yes  Barriers to discharge: Yes       Entered by: Wilma Mcguire RN 06/16/2023     BP (!) 141/62 (BP Location: Left arm)   Pulse 64   Temp 97.8  F (36.6  C) (Oral)   Resp 16   Ht 1.727 m (5' 8\")   Wt 112.8 kg (248 lb 10.9 oz)   LMP  (LMP Unknown)   SpO2 91%   BMI 37.81 kg/m      Pt is A&Ox4. VSS on RA.  Mod Carb diet. Ax1 w/ walker/GB. SW following for TCU placement. TCU placement at [facility] in [place] pending.      Please review provider order for any additional goals.   Nurse to notify provider when observation goals have been met and patient is ready for discharge.  "

## 2023-06-16 NOTE — PROGRESS NOTES
ANTICOAGULATION  MANAGEMENT: Discharge Review    Savanna Rehman chart reviewed for anticoagulation continuity of care    Hospital Admission on 6/13-6/16/23 for Chest pain.    Discharge disposition: TCU   [facility]  [phone]    Results:    Recent labs: (last 7 days)     06/10/23  1912 06/13/23  1247 06/13/23  1441 06/15/23  1540 06/16/23  0509   INR 2.24* 1.6* 1.46* 1.30* 1.24*     Anticoagulation inpatient management:     6/13-14 Hold  6/15- 2.5    Anticoagulation discharge instructions:     Warfarin dosing: home regimen continued   Bridging: No   INR goal change: No      Medication changes affecting anticoagulation: No    Additional factors affecting anticoagulation: No     PLAN     Agree with discharge plan for follow up on 1-2 days check INR with TCU    ACC will resume monitoring upon discharge from TCU    Anticoagulation Calendar updated    Iris Kemp RN

## 2023-06-16 NOTE — PLAN OF CARE
PRIMARY DIAGNOSIS: GENERALIZED WEAKNESS    OUTPATIENT/OBSERVATION GOALS TO BE MET BEFORE DISCHARGE  1. Orthostatic performed: No    2. Tolerating PO medications: Yes    3. Return to near baseline physical activity: No    4. Cleared for discharge by consultants (if involved): Yes    Discharge Planner Nurse   Safe discharge environment identified: Yes  Barriers to discharge: Yes       Entered by: Wilma Mcguire RN 06/16/2023 1:46 AM    Pt is A&Ox4. VSS on RA.  Mod Carb diet. Ax1 w/ walker/GB. SW following for TCU placement. TCU placement at [facility] in [place] pending.      Please review provider order for any additional goals.   Nurse to notify provider when observation goals have been met and patient is ready for discharge.

## 2023-06-16 NOTE — PLAN OF CARE
PRIMARY DIAGNOSIS: GENERALIZED WEAKNESS    OUTPATIENT/OBSERVATION GOALS TO BE MET BEFORE DISCHARGE  1. Orthostatic performed: No    2. Tolerating PO medications: Yes    3. Return to near baseline physical activity: No    4. Cleared for discharge by consultants (if involved): Yes    Discharge Planner Nurse   Safe discharge environment identified: Yes  Barriers to discharge: Yes       Entered by: Wilma Mcguire RN 06/15/2023 8:19 PM    Pt is A&Ox4. VSS on RA. Scheduled tylenol given for pain. Mod Carb diet. Ax1 w/ walker/GB. Pt incontinent of urine. SW following for TCU placement. TCU placement at [facility] in [place] pending.      Please review provider order for any additional goals.   Nurse to notify provider when observation goals have been met and patient is ready for discharge.

## 2023-06-16 NOTE — PLAN OF CARE
Physical Therapy Discharge Summary     Reason for therapy discharge:    Discharged to transitional care facility.     Progress towards therapy goal(s). See goals on Care Plan in Psychiatric electronic health record for goal details.  Goals not met.  Barriers to achieving goals:   discharge from facility.     Therapy recommendation(s):    Continued therapy is recommended.  Rationale/Recommendations:  Patient would benefit from PT eval at TCU in order to increase strength, activity tolerance, balance and independence with mobility.    **Pt not seen by discharging therapist on this date, note written based on previous treating therapist's notes and recommendations

## 2023-06-16 NOTE — PLAN OF CARE
PRIMARY DIAGNOSIS: GENERALIZED WEAKNESS    OUTPATIENT/OBSERVATION GOALS TO BE MET BEFORE DISCHARGE  1. Orthostatic performed: N/A    2. Tolerating PO medications: Yes    3. Return to near baseline physical activity: Yes    4. Cleared for discharge by consultants (if involved): Yes    Discharge Planner Nurse   Safe discharge environment identified: Yes  Barriers to discharge: No       Entered by: Carter Olivera RN 06/16/2023 10:27 AM     Please review provider order for any additional goals.   Nurse to notify provider when observation goals have been met and patient is ready for discharge.      RN - Hypertensive otherwise vitally stable. Pt denying pain. Up with assistance of 1 and walker gait belt. Tolerating PO intake. Voiding adequately. Pt receiving discharge orders - Anticipating ride  at 1145 to transfer to TCU.    OBSERVATION patient END time: 1145

## 2023-06-16 NOTE — PHARMACY-ANTICOAGULATION SERVICE
Clinical Pharmacy- Warfarin Discharge Note  This patient is currently on warfarin for the treatment of Atrial fibrillation. Per chart history, INR goal is 2-3.  Expected length of therapy undetermined.           Anticoagulation Dose History         Latest Ref Rng & Units 5/16/2023 5/23/2023 5/30/2023 6/10/2023   Recent Dosing and Labs   warfarin ANTICOAGULANT (COUMADIN) tablet 2.5 mg        INR 0.85 - 1.15 3.0      2.0      2.5      2.24         6/13/2023 6/15/2023 6/16/2023   Recent Dosing and Labs   warfarin ANTICOAGULANT (COUMADIN) tablet 2.5 mg  2.5 mg, $Given    INR 1.46      1.6      1.30   1.24           This result is from an external source.    Multiple values from one day are sorted in reverse-chronological order             Vitamin K doses administered during the last 7 days: none  FFP administered during the last 7 days: none  Discharge orders are to continue home regimen of warfarin 3.75 mg on Monday, Wednesday and Friday and 2.5 mg all other days of the week. INR check in 1-2 days.

## 2023-06-16 NOTE — PROGRESS NOTES
Care Management Discharge Note    Discharge Date: 06/16/2023       Discharge Disposition: Transitional Care    Discharge Services: None    Discharge DME: None    Discharge Transportation: agency    Private pay costs discussed: transportation costs    Does the patient's insurance plan have a 3 day qualifying hospital stay waiver?  No    PAS Confirmation Code: [id number]  Patient/family educated on Medicare website which has current facility and service quality ratings: yes    Education Provided on the Discharge Plan: Yes  Persons Notified of Discharge Plans: Pt, bedside nurse, provider, [facility]  Patient/Family in Agreement with the Plan: yes    Handoff Referral Completed: Yes    Additional Information:  Pt is discharging today to [facility].   Pt requesting transportation be arranged.  Reviewed out of pocket cost for [facility] transport, $81.80 for base rate and $5.26 per mile to the destination. Spoke with pt and she expressed understanding and is agreeable to this. Transportation time is set for 11:45 to 12:20.  Orders have been faxed to [facility].  Please call if additional needs arise.    Azul Saleh RN   Inpatient Care Coordination  [facility]   Phone: [phone]

## 2023-06-16 NOTE — DISCHARGE SUMMARY
"[facility]  Hospitalist Discharge Summary      Date of Admission:  6/13/2023  Date of Discharge:  6/16/2023  Discharging Provider: Hardik Doshi MD  Discharge Service: Hospitalist Service    Discharge Diagnoses   Chest wall pain after fall  Weakness  Right buttock/thigh hematoma due to fall    Clinically Significant Risk Factors     # Obesity: Estimated body mass index is 37.81 kg/m  as calculated from the following:    Height as of this encounter: 1.727 m (5' 8\").    Weight as of this encounter: 112.8 kg (248 lb 10.9 oz).       Follow-ups Needed After Discharge   Follow-up Appointments     Follow Up and recommended labs and tests      Follow up with senior living physician.  The following labs/tests are   recommended: cbc in 2-3 days, INR check in 1-2 days.  Follow up with primary care provider in 2-3 weeks.  No follow up labs or   test are needed.           Unresulted Labs Ordered in the Past 30 Days of this Admission     No orders found from 5/14/2023 to 6/14/2023.      These results will be followed up by NA    Discharge Disposition   Discharged to short-term care facility  Condition at discharge: Stable    Hospital Course   Savanna Rehman is a 82 year old female with PMHx significant for a fib anticoagulated on coumadin, HTN, HFpEF, DM type II, GERD, and CRISTIANA, who presents to the ED with right-sided chest pain.  She notes her chest pain started at 125 this afternoon while sitting in her chair texting.  She notes that it travels across her chest in waves.  She feels like she cannot get a deep breath because of the pain.  She does report a fall about 1 week ago where she fell onto her right buttocks and hip.  She has a large hematoma to this area.  She denies fever, chills, cough, abdominal pain, nausea, vomiting, diarrhea or dysuria.  Her chest wall is tender with palpation.  She also notes a fairly recent onset of tremors which she relates is the cause of her falls.  Family are " "concerned about her safety at home and would like TCU placement.  In the ED, vital signs are stable.  BMP is fairly unremarkable, creatinine stable at 1.02, glucose 185.  BNP is 1539, troponin slightly elevated at 25.  CBC is fairly unremarkable, hemoglobin is 9.3 which is 2 points lower than it was on 6/10.  Her INR is subtherapeutic but she has been holding her Coumadin.  She will be admitted to observation for further work-up of chest pain    Chest pain     - suspect musculoskeletal, related to recent fall    - CT PE done: neg for PE    - pain has improved with diclofenac    - no further cardiac work-up      Dizziness    - she describes as \"whooshing\"    - has been chronic    - was given meclizine by her PCP, but she never tried it    - will trial meclizine     Frequent falls    - patient is deconditioned    - lives in [address]+ independent living    - uses a scooter for distances    - falls likely multifactorial: deconditioning, obesity, dizziness, tremor    - PT to see, patient will need TCU    - already has outpatient Neuro referral     L buttock, thigh and low back hematoma    - had a fall > 1week ago    - was seen in the ED    - has hematoma     Acute anemia     - Hgb within normal limits prior to recent fall, suspect blood loss due to rather large size of hematoma.     - Hgb 14.8 in May>>11.2 1 week ago>> 9.3 on admission>> 8.3 today    - has had brown stool    - likely due to hematoma    - monitor    - coumadin resuming     A fib anticoagulated on coumadin    - will slowly resume coumadin (2.5mg x 1 tonight)    - not on rate controlling medications     HTN  HFpEF  LE edema    - EF 55-60% on last echo in 2020. Mod tricuspid regurg.    - cont Indapamide     DM type II    - resumed home glipizide    - glucose checks BID and hs    - hypoglycemia protocol     CRISTIANA    - has CPAP but not compliant    Patient has stable for discharge. SHe has a bed for today.  Patient is doing well. No complaints. Eating and drinking. " Having BMs.  Called and updated daughter.    Consultations This Hospital Stay   PHYSICAL THERAPY ADULT IP CONSULT  CARE MANAGEMENT / SOCIAL WORK IP CONSULT  PHYSICAL THERAPY ADULT IP CONSULT  OCCUPATIONAL THERAPY ADULT IP CONSULT    Code Status   Full Code    Time Spent on this Encounter   I, Hardik Doshi MD, personally saw the patient today and spent greater than 30 minutes discharging this patient.       Hardik Doshi MD  [facility] OBSERVATION DEPT  [address]  [facility] [address]  Phone: [phone]  ______________________________________________________________________    Physical Exam   Vital Signs: Temp: 97.8  F (36.6  C) Temp src: Oral BP: (!) 141/62 Pulse: 64   Resp: 16 SpO2: 91 % O2 Device: None (Room air)    Weight: 248 lbs 10.86 oz  Constitutional: awake, alert, cooperative, no apparent distress, and appears stated age  Eyes: Lids and lashes normal, pupils equal, round and reactive to light, extra ocular muscles intact, sclera clear, conjunctiva normal  ENT: Normocephalic, without obvious abnormality, atraumatic, sinuses nontender on palpation, external ears without lesions, oral pharynx with moist mucous membranes, tonsils without erythema or exudates, gums normal and good dentition.  Respiratory: No increased work of breathing, good air exchange, clear to auscultation bilaterally, no crackles or wheezing  Cardiovascular: Normal apical impulse, regular rate and rhythm, normal S1 and S2, no S3 or S4, and no murmur noted  GI: No scars, normal bowel sounds, soft, non-distended, non-tender, no masses palpated, no hepatosplenomegally  Skin: buttock hematoma soft, healing       Primary Care Physician   Patti Suárez    Discharge Orders      General info for SNF    Length of Stay Estimate: Short Term Care: Estimated # of Days 31-90  Condition at Discharge: Stable  Level of care:skilled   Rehabilitation Potential: Fair  Admission H&P remains valid and  up-to-date: Yes  Recent Chemotherapy: N/A  Use Nursing Home Standing Orders: Yes     Mantoux instructions    Give two-step Mantoux (PPD) Per Facility Policy Yes     Reason for your hospital stay    Chest wall pain, Right sided buttock/thigh hematoma due to fall on anticoagulation     Activity - Up with nursing assistance     Follow Up and recommended labs and tests    Follow up with alf physician.  The following labs/tests are recommended: cbc in 2-3 days, INR check in 1-2 days.  Follow up with primary care provider in 2-3 weeks.  No follow up labs or test are needed.     Full Code     Physical Therapy Adult Consult    Evaluate and treat as clinically indicated.    Reason:  Fall, buttock/thigh hematoma, weakness     Occupational Therapy Adult Consult    Evaluate and treat as clinically indicated.    Reason:  Fall, buttock/thigh hematoma, weakness     Fall precautions     Diet    Follow this diet upon discharge: Orders Placed This Encounter      Moderate Consistent Carb (60 g CHO per Meal) Diet       Significant Results and Procedures   Most Recent 3 CBC's:Recent Labs   Lab Test 06/16/23  0509 06/15/23  0509 06/14/23  0722   WBC 6.1 6.4 6.5   HGB 9.0* 9.2* 8.3*   MCV 97 95 96    193 165     Most Recent 3 BMP's:Recent Labs   Lab Test 06/16/23  0815 06/15/23  2149 06/15/23  1639 06/15/23  0736 06/15/23  0509 06/14/23  1202 06/14/23  0722 06/13/23 2010 06/13/23  1441   NA  --   --   --   --  138  --  137  --  136   POTASSIUM  --   --   --   --  3.9  --  3.8  --  3.9   CHLORIDE  --   --   --   --  101  --  99  --  97*   CO2  --   --   --   --  29  --  29  --  30*   BUN  --   --   --   --  16.6  --  17.1  --  18.1   CR  --   --   --   --  0.91  --  1.01*  --  1.02*   ANIONGAP  --   --   --   --  8  --  9  --  9   SAUL  --   --   --   --  8.8  --  8.8  --  9.1   * 163* 169*   < > 129*   < > 145*   < > 185*    < > = values in this interval not displayed.     Most Recent 2 LFT's:Recent Labs   Lab Test  06/10/23  1912 06/02/23  0949   AST 31 49*   ALT 19 26   ALKPHOS 107* 146*   BILITOTAL 1.2 0.6   ,   Results for orders placed or performed during the hospital encounter of 06/13/23   CT Chest Pulmonary Embolism w Contrast    Narrative    EXAM: CT CHEST PULMONARY EMBOLISM W CONTRAST  LOCATION: [facility]  DATE: 6/13/2023    INDICATION: right chest pain, shoulder pain, SOB, elevated dimer  COMPARISON: 06/10/2023  TECHNIQUE: CT chest pulmonary angiogram during arterial phase injection of IV contrast. Multiplanar reformats and MIP reconstructions were performed. Dose reduction techniques were used.   CONTRAST: 73mL Isovue 370    FINDINGS:  ANGIOGRAM CHEST: Mild prominence of the central pulmonary arteries which may be seen with underlying pulmonary arterial hypertension. No evidence of pulmonary embolism. Although contrast bolus is limited, there is no evidence of thoracic aortic   dissection. No CT evidence of right heart strain.    LUNGS AND PLEURA: Increased groundglass opacity throughout both lungs, slightly greater in the dependent portions. No pneumothorax.    MEDIASTINUM/AXILLAE: Mild cardiomegaly. No significant mediastinal or hilar adenopathy.    CORONARY ARTERY CALCIFICATION: Mild to moderate    UPPER ABDOMEN: Morphologic changes of cirrhosis. Cholecystectomy. Postoperative changes at the GE junction. Moderate atherosclerotic disease.    MUSCULOSKELETAL: No acute bony abnormalities.      Impression    IMPRESSION:  1.  No evidence of pulmonary embolus.  2.  Mild cardiomegaly, with diffuse mild groundglass opacities, early edema versus atelectasis.  3.  Additional findings as above.     *Note: Due to a large number of results and/or encounters for the requested time period, some results have not been displayed. A complete set of results can be found in Results Review.       Discharge Medications   Current Discharge Medication List      START taking these medications    Details    acetaminophen (TYLENOL) 325 MG tablet Take 3 tablets (975 mg) by mouth every 8 hours as needed for mild pain    Associated Diagnoses: Traumatic hematoma of buttock, subsequent encounter      diclofenac (VOLTAREN) 1 % topical gel Apply 2 g topically 4 times daily To painful areas    Associated Diagnoses: Traumatic hematoma of buttock, subsequent encounter; Chest wall pain      polyethylene glycol (MIRALAX) 17 g packet Take 17 g by mouth daily as needed for constipation    Associated Diagnoses: Constipation, unspecified constipation type      senna-docusate (SENOKOT-S/PERICOLACE) 8.6-50 MG tablet Take 1 tablet by mouth 2 times daily as needed for constipation    Associated Diagnoses: Constipation, unspecified constipation type         CONTINUE these medications which have NOT CHANGED    Details   butalbital-aspirin-caffeine (FIORINAL) -40 MG capsule TAKE 1 CAPSULE BY MOUTH EVERY 6 HOURS AS NEEDED FOR HEADACHE  Qty: 15 capsule, Refills: 0    Associated Diagnoses: Other migraine with status migrainosus, not intractable      EPINEPHrine (ANY BX GENERIC EQUIV) 0.3 MG/0.3ML injection 2-pack INJECT 0.3MLS (0.3MG) INTO THE MUSCLE ONCE AS NEEDED FOR ANAPHYLAXIS  Qty: 2 each, Refills: 1    Associated Diagnoses: Angioedema, initial encounter      famotidine (PEPCID) 20 MG tablet Take 20 mg by mouth daily      glipiZIDE (GLUCOTROL XL) 2.5 MG 24 hr tablet Take 1 tablet (2.5 mg) by mouth 2 times daily  Qty: 180 tablet, Refills: 3    Comments: Pt restarted and will call for refill.  Associated Diagnoses: Diabetic polyneuropathy associated with type 2 diabetes mellitus (H)      indapamide (LOZOL) 1.25 MG tablet Take 1 tablet (1.25 mg) by mouth every morning  Qty: 30 tablet, Refills: 0    Associated Diagnoses: Bilateral leg edema      loperamide (IMODIUM A-D) 2 MG tablet Take 2 mg by mouth 4 times daily as needed for diarrhea      meclizine (ANTIVERT) 12.5 MG tablet TAKE 1 TABLET BY MOUTH THREE TIMES DAILY AS NEEDED FOR  DIZZINESS  Qty: 30 tablet, Refills: 1    Associated Diagnoses: Benign paroxysmal positional vertigo, unspecified laterality      METAMUCIL FIBER PO Take 2 tablets by mouth daily      sertraline (ZOLOFT) 100 MG tablet Take 1 tablet (100 mg) by mouth daily  Qty: 90 tablet, Refills: 1    Associated Diagnoses: Anxiety and depression      Vitamin D3 (CHOLECALCIFEROL) 125 MCG (5000 UT) tablet Take 125 mcg by mouth daily      warfarin ANTICOAGULANT (COUMADIN) 2.5 MG tablet Take 2.5-3.75 mg by mouth See Admin Instructions 3.75 mg Sunday, Wednesday, Friday  2.5 mg all other days of the week      ACE/ARB/ARNI NOT PRESCRIBED (INTENTIONAL) Please choose reason not prescribed, below    Associated Diagnoses: Diabetic polyneuropathy associated with type 2 diabetes mellitus (H)      blood glucose (NO BRAND SPECIFIED) lancets standard Use to test blood sugar  as directed.  Qty: 1 each, Refills: 0    Associated Diagnoses: Diabetes mellitus (H)      blood glucose (NO BRAND SPECIFIED) lancing device Device to be used with lancets. Patient requests Amol Microlet 2 lancing device to check blood glucose once per day.  Qty: 1 each, Refills: 0    Associated Diagnoses: Diabetes mellitus, type 2 (H)      blood glucose (ONETOUCH ULTRA) test strip Use to test blood sugar 1 times daily  Qty: 100 strip, Refills: 1    Associated Diagnoses: Type 2 diabetes mellitus with hyperglycemia, without long-term current use of insulin (H); Diabetic polyneuropathy associated with type 2 diabetes mellitus (H)      blood glucose calibration (NO BRAND SPECIFIED) solution Use to calibrate blood glucose monitor  Qty: 1 Bottle, Refills: 3    Associated Diagnoses: Type 2 diabetes mellitus with hyperglycemia, without long-term current use of insulin (H); Diabetic polyneuropathy associated with type 2 diabetes mellitus (H)      blood glucose monitoring (NO BRAND SPECIFIED) meter device kit Use to test blood sugar 1 time daily  Qty: 1 kit, Refills: 0    Associated  Diagnoses: Type 2 diabetes mellitus with hyperglycemia, without long-term current use of insulin (H); Diabetic polyneuropathy associated with type 2 diabetes mellitus (H)           Allergies   Allergies   Allergen Reactions     Augmented Betamethasone Diprop [Betamethasone] Other (See Comments)     Severe yeast infection     Petroleum Jelly [Petrolatum] Anaphylaxis     Rash and swelling     Shellfish-Derived Products Anaphylaxis     Tongue swelling     Aspirin Swelling     tiongue swelling     Bacitracin      Rash swelling     Bactrim [Sulfamethoxazole-Trimethoprim] Dizziness     Coumadin [Warfarin] Swelling     Leg swelling     Darvon [Propoxyphene] Swelling     Throat closes     Dilaudid [Hydromorphone]      No side effects from fentanyl June 2023     Levaquin [Levofloxacin] Swelling     Tongue swelling     Lidocaine      Neomycin Swelling     rash     Neosporin [Neomycin-Polymyxin-Gramicidin] Swelling     rash     Nitrofurantoin      SOB, GI upset,     Oxycodone      Severe itching     Percodan [Oxycodone-Aspirin]      Severe itching     Polymyxin B      Pramoxine      Tramadol      Vicodin [Hydrocodone-Acetaminophen]      Severe itching       Xarelto [Rivaroxaban]      Adhesive Tape Rash     Band aids      Codeine Rash     Hydrocortisone Rash and Swelling     Other Environmental Allergy Rash     Adhesive tape   Band aids

## 2023-06-19 ENCOUNTER — MEDICAL CORRESPONDENCE (OUTPATIENT)
Dept: HEALTH INFORMATION MANAGEMENT | Facility: CLINIC | Age: 83
End: 2023-06-19

## 2023-06-19 ENCOUNTER — PATIENT OUTREACH (OUTPATIENT)
Dept: CARE COORDINATION | Facility: CLINIC | Age: 83
End: 2023-06-19

## 2023-06-19 ENCOUNTER — TRANSITIONAL CARE UNIT VISIT (OUTPATIENT)
Dept: GERIATRICS | Facility: CLINIC | Age: 83
End: 2023-06-19
Payer: COMMERCIAL

## 2023-06-19 ENCOUNTER — TELEPHONE (OUTPATIENT)
Dept: INTERNAL MEDICINE | Facility: CLINIC | Age: 83
End: 2023-06-19

## 2023-06-19 VITALS
HEIGHT: 68 IN | WEIGHT: 246.8 LBS | RESPIRATION RATE: 18 BRPM | HEART RATE: 76 BPM | BODY MASS INDEX: 37.41 KG/M2 | TEMPERATURE: 97.1 F | SYSTOLIC BLOOD PRESSURE: 135 MMHG | DIASTOLIC BLOOD PRESSURE: 64 MMHG | OXYGEN SATURATION: 95 %

## 2023-06-19 DIAGNOSIS — E11.22 TYPE 2 DIABETES MELLITUS WITH STAGE 3A CHRONIC KIDNEY DISEASE, WITHOUT LONG-TERM CURRENT USE OF INSULIN (H): ICD-10-CM

## 2023-06-19 DIAGNOSIS — R53.81 PHYSICAL DECONDITIONING: ICD-10-CM

## 2023-06-19 DIAGNOSIS — R07.89 RIGHT-SIDED CHEST WALL PAIN: ICD-10-CM

## 2023-06-19 DIAGNOSIS — S70.11XD CONTUSION OF RIGHT THIGH, SUBSEQUENT ENCOUNTER: ICD-10-CM

## 2023-06-19 DIAGNOSIS — K21.9 GASTRO-ESOPHAGEAL REFLUX DISEASE WITHOUT ESOPHAGITIS: ICD-10-CM

## 2023-06-19 DIAGNOSIS — Z79.01 ANTICOAGULATED ON COUMADIN: ICD-10-CM

## 2023-06-19 DIAGNOSIS — D62 ACUTE POSTHEMORRHAGIC ANEMIA: ICD-10-CM

## 2023-06-19 DIAGNOSIS — I11.0 HYPERTENSIVE HEART DISEASE WITH CHRONIC DIASTOLIC CONGESTIVE HEART FAILURE (H): ICD-10-CM

## 2023-06-19 DIAGNOSIS — Z86.19 HISTORY OF CLOSTRIDIUM DIFFICILE COLITIS: ICD-10-CM

## 2023-06-19 DIAGNOSIS — G47.33 OBSTRUCTIVE SLEEP APNEA (ADULT) (PEDIATRIC): ICD-10-CM

## 2023-06-19 DIAGNOSIS — I50.32 HYPERTENSIVE HEART DISEASE WITH CHRONIC DIASTOLIC CONGESTIVE HEART FAILURE (H): ICD-10-CM

## 2023-06-19 DIAGNOSIS — E55.9 VITAMIN D DEFICIENCY: ICD-10-CM

## 2023-06-19 DIAGNOSIS — S30.0XXD CONTUSION OF LOWER BACK AND PELVIS, SUBSEQUENT ENCOUNTER: ICD-10-CM

## 2023-06-19 DIAGNOSIS — N18.31 TYPE 2 DIABETES MELLITUS WITH STAGE 3A CHRONIC KIDNEY DISEASE, WITHOUT LONG-TERM CURRENT USE OF INSULIN (H): ICD-10-CM

## 2023-06-19 DIAGNOSIS — R19.5 LOOSE STOOLS: ICD-10-CM

## 2023-06-19 DIAGNOSIS — R42 DIZZINESS AND GIDDINESS: ICD-10-CM

## 2023-06-19 DIAGNOSIS — R29.6 REPEATED FALLS: Primary | ICD-10-CM

## 2023-06-19 DIAGNOSIS — I48.20 CHRONIC ATRIAL FIBRILLATION (H): ICD-10-CM

## 2023-06-19 PROBLEM — I11.9 HYPERTENSIVE HEART DISEASE WITHOUT HEART FAILURE: Status: RESOLVED | Noted: 2023-06-19 | Resolved: 2023-06-19

## 2023-06-19 PROBLEM — R15.2 FECAL URGENCY: Status: RESOLVED | Noted: 2023-06-02 | Resolved: 2023-06-19

## 2023-06-19 PROBLEM — I11.9 HYPERTENSIVE HEART DISEASE WITHOUT HEART FAILURE: Status: ACTIVE | Noted: 2023-06-19

## 2023-06-19 LAB
ERYTHROCYTE [DISTWIDTH] IN BLOOD BY AUTOMATED COUNT: 16.7 % (ref 10–15)
HCT VFR BLD AUTO: 32.2 % (ref 35–47)
HGB BLD-MCNC: 10.1 G/DL (ref 11.7–15.7)
MCH RBC QN AUTO: 30.8 PG (ref 26.5–33)
MCHC RBC AUTO-ENTMCNC: 31.4 G/DL (ref 31.5–36.5)
MCV RBC AUTO: 98 FL (ref 78–100)
PLATELET # BLD AUTO: 242 10E3/UL (ref 150–450)
RBC # BLD AUTO: 3.28 10E6/UL (ref 3.8–5.2)
WBC # BLD AUTO: 5.6 10E3/UL (ref 4–11)

## 2023-06-19 PROCEDURE — 85027 COMPLETE CBC AUTOMATED: CPT | Mod: ORL | Performed by: NURSE PRACTITIONER

## 2023-06-19 PROCEDURE — 36415 COLL VENOUS BLD VENIPUNCTURE: CPT | Mod: ORL | Performed by: NURSE PRACTITIONER

## 2023-06-19 PROCEDURE — 99310 SBSQ NF CARE HIGH MDM 45: CPT | Performed by: NURSE PRACTITIONER

## 2023-06-19 PROCEDURE — P9604 ONE-WAY ALLOW PRORATED TRIP: HCPCS | Mod: ORL | Performed by: NURSE PRACTITIONER

## 2023-06-19 NOTE — PROGRESS NOTES
Clinic Care Coordination Contact  Care Coordination Transition Communication    Clinical Data: Patient was hospitalized at [facility] from 6/13/2023 to 6/16/2023 with diagnosis of Chest wall pain after fall, Weakness, Right buttock/thigh hematoma due to fall.     Transition to Facility:              Facility Name: [facility]              Contact name and phone number/fax: [phone]    Plan: SW Care Coordinator will await notification from facility staff informing SW Care Coordinator of patient's discharge plans/needs. SW Care Coordinator will review chart and outreach to facility staff every 4 weeks and as needed.     ANA LUISA Pozo/[facility] Center[facility]NA[facility]  Social Work Care Coordinator  [facility] [place] [place] [place]  Phone: [phone]

## 2023-06-19 NOTE — TELEPHONE ENCOUNTER
Physician's order form from [facility] stating patient will be discharged from home care services when transferred to TCU recieved via fax. Form in your mailbox for signature.

## 2023-06-19 NOTE — LETTER
[facility]   To:       Please give to facility    From:   Sfoya Zheng    Care Coordinator   [facility]   P: [phone] [email or web address]   Patient Name:  Savanna Rehman YOB: 1940   Admit date: 6/16/2023      *Information Needed:  Please contact me when the patient will discharge (or if they will move to long term care)- include the discharge date, disposition, and main diagnosis   - If the patient is discharged with home care services, please provide the name of the agency    Also- Please inform me if a care conference is being held.   Phone, Fax or Email with information                Thank you

## 2023-06-19 NOTE — PROGRESS NOTES
[facility]  PRIMARY CARE PROVIDER AND CLINIC:  Patti Suárez MD, [address] [facility] [address]  Chief Complaint   Patient presents with     Establish Care     [place] Medical Record Number:  [id number]  Place of Service where encounter took place:  [facility] ([facility]) [[id number]]    Savanna Rehman  is a 82 year old  (1940), admitted to the above facility from  [facility]. Hospital stay 6/13/23 through 6/16/23..  Admitted to this facility for  rehab, medical management and nursing care.     Repeated falls  Dizziness and giddiness  Right-sided chest wall pain  Contusion of lower back and pelvis, subsequent encounter  Contusion of right thigh, subsequent encounter  Physical deconditioning  Acute posthemorrhagic anemia  Chronic atrial fibrillation (H)  Anticoagulated on Coumadin  Hypertensive heart disease with chronic diastolic congestive heart failure (H)  Type 2 diabetes mellitus with stage 3a chronic kidney disease, without long-term current use of insulin (H)  Obstructive sleep apnea (adult) (pediatric)  Gastro-esophageal reflux disease without esophagitis  Vitamin D deficiency  Loose stools  History of Clostridium difficile colitis    HPI:    HPI information obtained from: facility chart records, facility staff, patient report, [facility] chart review and Care Everywhere [facility] chart review.   Brief Summary of Hospital Course:  Very pleasant lady who fell about a week ago and went to hospital with right sided chest pain.. she had a large hematoma on her buttock, hip and pelvic areas. She is on coumadin. She was worked and (-) for MI or PE. Pain better with Voltaren gel. She has a chronic dizziness and was given meclizine by PCP in past but had not tried it.. she lives in a [address]+ apt and uses  scooter for distances.  She has CRISTIANA but does not stanislav CPAP at home consistently per hospital notes. Hgb dropped from  11.2 in May to  8.3 in hospital. She was sent for rehab.    TODAY DURING EXAM/ROS:  No CP, SOB, Cough, fevers, chills, HA, N/V, dysuria. Had C diff last years and has loose Bm--metamucil helps--4/day. . Appetite is good.  No pain except neuropathy in feet. Has had dizziness for some times and wants to see neurology as outpt.  Also has a urology MD as out pt as has urgent incontinence for 4 years.. says sees GI for her bowel issues.      CODE STATUS/ADVANCE DIRECTIVES DISCUSSION:   CPR/Full code   Patient's living condition: lives with spouse  ALLERGIES: Augmented betamethasone diprop [betamethasone], Petroleum jelly [petrolatum], Shellfish-derived products, Aspirin, Bacitracin, Bactrim [sulfamethoxazole-trimethoprim], Coumadin [warfarin], Darvon [propoxyphene], Dilaudid [hydromorphone], Levaquin [levofloxacin], Lidocaine, Neomycin, Neosporin [neomycin-polymyxin-gramicidin], Nitrofurantoin, Oxycodone, Percodan [oxycodone-aspirin], Polymyxin b, Pramoxine, Tramadol, Vicodin [hydrocodone-acetaminophen], Xarelto [rivaroxaban], Adhesive tape, Codeine, Hydrocortisone, and Other environmental allergy  PAST MEDICAL HISTORY:  has a past medical history of Anemia, Atrial fibrillation (H), CAD (coronary artery disease), Chronic pain, Congestive heart failure (H), Degenerative disk disease, Diabetes (H), Fibromyalgia, Gastro-oesophageal reflux disease, Gout, Hiatal hernia, Mumps, Neuropathy, CRISTIANA (obstructive sleep apnea) - CPAP, Palpitations, Pernicious anemia, Sleep apnea, Urinary incontinence, and Vitamin D deficiency.    She has no past medical history of Asymptomatic human immunodeficiency virus (HIV) infection status (H), Cerebral palsy (H), Congenital renal agenesis and dysgenesis, Goiter, Hernia, abdominal, History of spinal cord injury, History of thrombophlebitis, Horseshoe kidney, Hydrocephalus (H), Malignant hyperthermia, Parkinsons disease (H), Spina bifida (H), STD (sexually transmitted disease), Tethered cord (H),  or Tuberculosis.  PAST SURGICAL HISTORY:   has a past surgical history that includes Cholecystectomy; Hysterectomy total abdominal; appendectomy; colonoscopy (3/15/2011); Laparoscopic nissen fundoplication (N/A, 2/4/2015); Heart Cath Left heart cath (12/30/16); Coronary Angiography Adult Order; Tonsillectomy; Knee replacement NOS (Left); Transposition ulnar nerve (elbow); Cystoscopy, biopsy bladder, combined (N/A, 7/13/2020); Colonoscopy (N/A, 3/15/2023); and Colonoscopy (N/A, 3/15/2023).  FAMILY HISTORY: family history includes Alzheimer Disease in her mother; Lung Cancer in her father; No Known Problems in her brother, brother, and brother.  SOCIAL HISTORY:   reports that she quit smoking about 8 years ago. Her smoking use included cigarettes. She has a 25.00 pack-year smoking history. She has been exposed to tobacco smoke. She has never used smokeless tobacco. She reports current alcohol use. She reports that she does not use drugs.    Post Discharge Medication Reconciliation Status: discharge medications reconciled and changed, per note/orders     Current Outpatient Medications   Medication Sig Dispense Refill     acetaminophen (TYLENOL) 325 MG tablet Take 3 tablets (975 mg) by mouth every 8 hours as needed for mild pain       butalbital-aspirin-caffeine (FIORINAL) -40 MG capsule TAKE 1 CAPSULE BY MOUTH EVERY 6 HOURS AS NEEDED FOR HEADACHE 15 capsule 0     diclofenac (VOLTAREN) 1 % topical gel Apply 2 g topically 4 times daily To painful areas       EPINEPHrine (ANY BX GENERIC EQUIV) 0.3 MG/0.3ML injection 2-pack INJECT 0.3MLS (0.3MG) INTO THE MUSCLE ONCE AS NEEDED FOR ANAPHYLAXIS 2 each 1     famotidine (PEPCID) 20 MG tablet Take 20 mg by mouth daily       glipiZIDE (GLUCOTROL XL) 2.5 MG 24 hr tablet Take 1 tablet (2.5 mg) by mouth 2 times daily 180 tablet 3     indapamide (LOZOL) 1.25 MG tablet Take 1 tablet (1.25 mg) by mouth every morning 30 tablet 0     loperamide (IMODIUM A-D) 2 MG tablet Take 2 mg  "by mouth 4 times daily as needed for diarrhea       meclizine (ANTIVERT) 12.5 MG tablet TAKE 1 TABLET BY MOUTH THREE TIMES DAILY AS NEEDED FOR DIZZINESS 30 tablet 1     METAMUCIL FIBER PO Take 2 tablets by mouth daily       polyethylene glycol (MIRALAX) 17 g packet Take 17 g by mouth daily as needed for constipation       senna-docusate (SENOKOT-S/PERICOLACE) 8.6-50 MG tablet Take 1 tablet by mouth 2 times daily as needed for constipation       sertraline (ZOLOFT) 100 MG tablet Take 1 tablet (100 mg) by mouth daily 90 tablet 1     Vitamin D3 (CHOLECALCIFEROL) 125 MCG (5000 UT) tablet Take 125 mcg by mouth daily       warfarin ANTICOAGULANT (COUMADIN) 2.5 MG tablet Take 2.5-3.75 mg by mouth See Admin Instructions 3.75 mg Sunday, Wednesday, Friday  2.5 mg all other days of the week       ACE/ARB/ARNI NOT PRESCRIBED (INTENTIONAL) Please choose reason not prescribed, below       blood glucose (NO BRAND SPECIFIED) lancets standard Use to test blood sugar  as directed. 1 each 0     blood glucose (NO BRAND SPECIFIED) lancing device Device to be used with lancets. Patient requests [employer] 2 lancing device to check blood glucose once per day. 1 each 0     blood glucose (ONETOUCH ULTRA) test strip Use to test blood sugar 1 times daily 100 strip 1     blood glucose calibration (NO BRAND SPECIFIED) solution Use to calibrate blood glucose monitor 1 Bottle 3     blood glucose monitoring (NO BRAND SPECIFIED) meter device kit Use to test blood sugar 1 time daily 1 kit 0           ROS:  10 point ROS of systems including Constitutional, Eyes, Respiratory, Cardiovascular, Gastroenterology, Genitourinary, Integumentary, Musculoskeletal, Psychiatric were all negative except for pertinent positives noted in my HPI.    Vitals:  /64   Pulse 76   Temp 97.1  F (36.2  C)   Resp 18   Ht 1.727 m (5' 8\")   Wt 111.9 kg (246 lb 12.8 oz)   LMP  (LMP Unknown)   SpO2 95%   BMI 37.53 kg/m    Exam:  GENERAL APPEARANCE:  Alert, in " no distress, oriented, morbidly obese, cooperative  ENT:  Mouth and posterior oropharynx normal, moist mucous membranes, normal hearing acuity  EYES:  Conjunctiva and lids normal  RESP:  respiratory effort and palpation of chest normal, lungs clear to auscultation , no respiratory distress, diminished breath sounds    CV:  Palpation and auscultation of heart done , irregular HR, no murmur, rub, or gallop. Trace pitting Rt > Left LE edema, +2 pedal pulses  ABDOMEN:  normal bowel sounds, soft, nontender, obese  M/S:   AGUILAR and had shower with OT this am  SKIN:  Inspection of skin and subcutaneous tissue baseline, bruising lower back/upper buttock, po right buttock and thigh--no open areas, area is swollen but soft.  NEURO:   Cranial nerves 2-12 are normal tested and grossly at patient's baseline  PSYCH:  oriented X 3, normal insight, judgement and memory    Lab/Diagnostic data:  Recent Labs   Lab Test 06/16/23  0815 06/15/23  2149 06/15/23  0736 06/15/23  0509 06/14/23  1202 06/14/23  0722   NA  --   --   --  138  --  137   POTASSIUM  --   --   --  3.9  --  3.8   CHLORIDE  --   --   --  101  --  99   CO2  --   --   --  29  --  29   ANIONGAP  --   --   --  8  --  9   * 163*   < > 129*   < > 145*   BUN  --   --   --  16.6  --  17.1   CR  --   --   --  0.91  --  1.01*   SAUL  --   --   --  8.8  --  8.8    < > = values in this interval not displayed.     Hemoglobin   Date Value Ref Range Status   06/19/2023 10.1 (L) 11.7 - 15.7 g/dL Final   06/16/2023 9.0 (L) 11.7 - 15.7 g/dL Final   02/22/2021 14.7 11.7 - 15.7 g/dL Final   12/04/2020 15.2 11.7 - 15.7 g/dL Final   6/2/2023: Vitamin D --11    ASSESSMENT / PLAN:    (R29.6) Repeated falls  (primary encounter diagnosis)  (R42) Dizziness and giddiness  (R07.89) Right-sided chest wall pain   (S30.0XXD) Contusion of lower back and pelvis, subsequent encounter   (S70.11XD) Contusion of right thigh, subsequent encounter  (D62) Acute posthemorrhagic anemia  Comment/Plan:  improving bruises,  Hgb better today, check prn.  Consider f/u with neurology for dizziness. Antivert prn, will monitor.    (R53.81) Physical deconditioning  Comment/Plan: PT OT    (I48.20) Chronic atrial fibrillation (H)  (Z79.01) Anticoagulated on Coumadin  Comment/Plan: slow increase of Coumadin,, INR 1.4 today so cont same coumadin regimen, check on 6/22    (I11.0,  I50.32) Hypertensive heart disease with chronic diastolic congestive heart failure (H)  Comment/Plan: cont current meds, compensated, cont indapamide, monitor    (E11.22,  N18.31) Type 2 diabetes mellitus with stage 3a chronic kidney disease, without long-term current use of insulin (H)  Comment/Plan: glucotrol, check accuchecks, monitor. Check BMP  prn    (G47.33) Obstructive sleep apnea (adult)   Comment:Plan: does not use consisently at home    (K21.9) Gastro-esophageal reflux disease without esophagitis  Comment/Plan: quiescent, cont with pepcid. Monitor.    (E55.9) Vitamin D deficiency  Comment/Plan: very low on 6/2/23--will add 8 weeks of 50K units also, cont with 125mcg daily , check in 10-12 weeks.    (R19.5) Loose stools  Comment/Plan: increase metamucil to her home regime of 2 capsules bid, monitor.    (Z86.19) History of Clostridium difficile colitis         Electronically signed by:  BATSHEVA Pelletier CNP

## 2023-06-20 ENCOUNTER — DOCUMENTATION ONLY (OUTPATIENT)
Dept: OTHER | Facility: CLINIC | Age: 83
End: 2023-06-20
Payer: COMMERCIAL

## 2023-06-22 ENCOUNTER — TRANSITIONAL CARE UNIT VISIT (OUTPATIENT)
Dept: GERIATRICS | Facility: CLINIC | Age: 83
End: 2023-06-22
Payer: COMMERCIAL

## 2023-06-22 VITALS
DIASTOLIC BLOOD PRESSURE: 67 MMHG | OXYGEN SATURATION: 99 % | RESPIRATION RATE: 20 BRPM | HEIGHT: 68 IN | BODY MASS INDEX: 38.4 KG/M2 | TEMPERATURE: 97.1 F | SYSTOLIC BLOOD PRESSURE: 137 MMHG | WEIGHT: 253.4 LBS | HEART RATE: 75 BPM

## 2023-06-22 DIAGNOSIS — I50.32 HYPERTENSIVE HEART DISEASE WITH CHRONIC DIASTOLIC CONGESTIVE HEART FAILURE (H): ICD-10-CM

## 2023-06-22 DIAGNOSIS — R42 DIZZINESS AND GIDDINESS: ICD-10-CM

## 2023-06-22 DIAGNOSIS — E11.22 TYPE 2 DIABETES MELLITUS WITH STAGE 3A CHRONIC KIDNEY DISEASE, WITHOUT LONG-TERM CURRENT USE OF INSULIN (H): ICD-10-CM

## 2023-06-22 DIAGNOSIS — R07.89 RIGHT-SIDED CHEST WALL PAIN: ICD-10-CM

## 2023-06-22 DIAGNOSIS — R29.6 REPEATED FALLS: ICD-10-CM

## 2023-06-22 DIAGNOSIS — I48.20 CHRONIC ATRIAL FIBRILLATION (H): ICD-10-CM

## 2023-06-22 DIAGNOSIS — D62 ACUTE POSTHEMORRHAGIC ANEMIA: ICD-10-CM

## 2023-06-22 DIAGNOSIS — S30.0XXD CONTUSION OF LOWER BACK AND PELVIS, SUBSEQUENT ENCOUNTER: Primary | ICD-10-CM

## 2023-06-22 DIAGNOSIS — R53.81 PHYSICAL DECONDITIONING: ICD-10-CM

## 2023-06-22 DIAGNOSIS — I11.0 HYPERTENSIVE HEART DISEASE WITH CHRONIC DIASTOLIC CONGESTIVE HEART FAILURE (H): ICD-10-CM

## 2023-06-22 DIAGNOSIS — G43.801 OTHER MIGRAINE WITH STATUS MIGRAINOSUS, NOT INTRACTABLE: ICD-10-CM

## 2023-06-22 DIAGNOSIS — G47.33 OBSTRUCTIVE SLEEP APNEA (ADULT) (PEDIATRIC): ICD-10-CM

## 2023-06-22 DIAGNOSIS — N18.31 TYPE 2 DIABETES MELLITUS WITH STAGE 3A CHRONIC KIDNEY DISEASE, WITHOUT LONG-TERM CURRENT USE OF INSULIN (H): ICD-10-CM

## 2023-06-22 PROCEDURE — 99305 1ST NF CARE MODERATE MDM 35: CPT | Performed by: INTERNAL MEDICINE

## 2023-06-22 RX ORDER — BUTALBITAL, ASPIRIN, AND CAFFEINE 325; 50; 40 MG/1; MG/1; MG/1
CAPSULE ORAL
Qty: 15 CAPSULE | Refills: 0 | Status: SHIPPED | OUTPATIENT
Start: 2023-06-22 | End: 2023-10-09

## 2023-06-22 NOTE — PROGRESS NOTES
"Savanna Rehman is a 82 year old female seen June 22, 2023 at [facility] where she was admitted after [facility] hospitalization 6/13-16 following a fall at home in her bathroom.   Pt was initially seen in the ED on 6/10 which was 5 days after the fall.   Noted to have a large right buttock hematoma, other imaging unremarkable.   She returned home, but then hospitalized 6/13-16 for right sided chest wall pain and tenderness.  Mobility impaired.   She was admitted for pain management and TCU placement.        Today pt is seen in her room sitting edge of bed.     \"I thought I was having a heart attack  [place] like it was on fire.\"   Fall on a Monday a week before she went to ED first time.  Couldn't stand up, called EMTs but declined transport, then did go to hospital 2 days later.      Pain is much better but still there.  Voltaren gel not helping.   \"My 5th fall this year   My legs are palsied and I have neuropathy on bottom of my feet.\"       Many recent problems:  C diff for 13 months until November   Kidney stones, cirrhosis (FERREIRA, sees GI)    Sees Dr Lauren for primary care.     Past Medical History:   Diagnosis Date     Anemia     Iron Deficiency anemia     Atrial fibrillation (H)      CAD (coronary artery disease)     non-obstructive     Chronic pain     neck, low back, legs     Congestive heart failure (H)      Degenerative disk disease      Diabetes (H)      Fibromyalgia      Gastro-oesophageal reflux disease      Gout      Hiatal hernia      Mumps      Neuropathy      CRISTIANA (obstructive sleep apnea) - CPAP      Palpitations      Pernicious anemia      Sleep apnea     uses CPAP.     Urinary incontinence      Vitamin D deficiency        Past Surgical History:   Procedure Laterality Date     APPENDECTOMY       CHOLECYSTECTOMY       COLONOSCOPY  3/15/2011     COLONOSCOPY N/A 3/15/2023    Procedure: COLONOSCOPY, WITH POLYPECTOMY AND BIOPSY;  Surgeon: Maci Coronel MD;  Location: "  GI     COLONOSCOPY N/A 3/15/2023    Procedure: COLONOSCOPY, FLEXIBLE, WITH LESION REMOVAL USING SNARE;  Surgeon: Maci Coronel MD;  Location:  GI     CORONARY ANGIOGRAPHY ADULT ORDER       CYSTOSCOPY, BIOPSY BLADDER, COMBINED N/A 2020    Procedure: CYSTOSCOPY, WITH BLADDER BIOPSY;  Surgeon: Deisy Wilson MD;  Location:  OR     HEART CATH LEFT HEART CATH  16    medication management     HYSTERECTOMY TOTAL ABDOMINAL       Knee replacement NOS Left      LAPAROSCOPIC NISSEN FUNDOPLICATION N/A 2015    Procedure: LAPAROSCOPIC NISSEN FUNDOPLICATION;  Surgeon: Armando Ansari MD;  Location:  OR     TONSILLECTOMY       TRANSPOSITION ULNAR NERVE (ELBOW)         Family History   Problem Relation Age of Onset     Alzheimer Disease Mother      Lung Cancer Father      No Known Problems Brother      No Known Problems Brother      No Known Problems Brother      Unknown/Adopted No family hx of        Social History     Tobacco Use     Smoking status: Former     Packs/day: 0.50     Years: 50.00     Pack years: 25.00     Types: Cigarettes     Quit date: 2014     Years since quittin.8     Passive exposure: Past     Smokeless tobacco: Never   Substance Use Topics     Alcohol use: Yes     Comment: couple a month      SH:  [place]ives alone, [place] apartment in [place]  Moved here from [place] 8 years ago  Daughter Roya     Review Of Systems  Skin: negative   Eyes: impaired vision, glasses  Ears/Nose/Throat: hearing loss  Respiratory: No shortness of breath, dyspnea on exertion, cough, or hemoptysis  Cardiovascular: negative  Gastrointestinal: FERREIRA, obesity  Genitourinary: incontinence  Musculoskeletal: frequent falls, ambulatory within her apartment but uses an electric scooter for distances     Currently independent with transfers and bed mobility, ambulates 82' with FWW and supervision.   [place] 14  Neurologic: neuropathy, SLUMS   Psychiatric:  "negative  Hematologic/Lymphatic/Immunologic: anemia  Endocrine: DM2     GENERAL APPEARANCE: alert and no distress  /67   Pulse 75   Temp 97.1  F (36.2  C)   Resp 20   Ht 1.727 m (5' 8\")   Wt 114.9 kg (253 lb 6.4 oz)   LMP  (LMP Unknown)   SpO2 99%   BMI 38.53 kg/m     HEENT: normocephalic, no lesion or abnormalities  NECK: no adenopathy, no asymmetry, masses, or scars and thyroid normal to palpation  RESP: lungs clear to auscultation - no rales, rhonchi or wheezes; good air movement   CV: irregular rate and rhythm, normal S1 S2, I/VI HSM  ABDOMEN: obese, soft, nontender, no HSM or masses and bowel sounds normal  MS: extremities with 1+ magali,a   Large tense hematoma of right buttock, +bruising   SKIN: no suspicious lesions or rashes  NEURO: Normal strength and tone, sensory exam grossly normal, and speech normal  PSYCH: affect okay  LYMPHATICS: No cervical,  or supraclavicular nodes    Lab Results   Component Value Date     06/15/2023    POTASSIUM 3.9 06/15/2023    CHLORIDE 101 06/15/2023    CO2 29 06/15/2023    ANIONGAP 8 06/15/2023     (H) 06/16/2023    BUN 16.6 06/15/2023    CR 0.91 06/15/2023    GFRESTIMATED 63 06/15/2023    SAUL 8.8 06/15/2023     Lab Results   Component Value Date    AST 31 06/10/2023      ALBUMIN 3.5 06/10/2023      ALKPHOS 107 06/10/2023     Lab Results   Component Value Date    WBC 5.6 06/19/2023      HGB 10.1 06/19/2023      MCV 98 06/19/2023       06/19/2023     TSH   Date Value Ref Range Status   05/12/2023 2.23 0.30 - 4.20 uIU/mL Final   11/07/2022 0.76 0.40 - 4.00 mU/L Final   02/22/2021 1.37 0.40 - 4.00 mU/L Final       Vit D level 11 on 6/2/2023  B12 level 563       CT CHEST/ABDOMEN/PELVIS W CONTRAST   6/10/2023   INDICATION: fell Monday, massive hematoma R buttock, across midline, proximal thigh  LUNGS AND PLEURA: No pleural effusion or pneumothorax. No pulmonary contusion. Faint, ill-defined peribronchial opacities laterally in the right lower lobe " measuring up to 2 mm (4/214) could reflect some mild infiltrate. No significant pulmonary nodule or mass.  CORONARY ARTERY CALCIFICATION: Moderate.  HEPATOBILIARY: Mild nodularity of the liver surface compatible with cirrhosis. No hepatic contusion or laceration. No suspicious liver lesion. Cholecystectomy. No significant biliary dilation.  PANCREAS: Diffuse fatty atrophy of the pancreas. No acute peripancreatic inflammation.  SPLEEN: Borderline spleen size measuring 13.2 cm. No splenic contusion or laceration. No perisplenic fluid.  VASCULATURE: Normal caliber abdominal aorta. Aortoiliac atheromatous disease.  PELVIC ORGANS: Hysterectomy. No adnexal mass.  MUSCULOSKELETAL: Mixed attenuating fluid collection in the right buttock measuring 9.1 x 15.9 x 15.7 cm compatible with reported hematoma. No convincing active contrast extravasation within the limitations of a single phase exam. Area of more central   hypoattenuation may be some liquefied clot. Adjacent soft tissue edema extending from the right lumbar region to the upper right thigh. No underlying fractures. Skeletal degenerative changes.                                                              IMPRESSION:  1.  Large right buttock hematoma. No convincing active bleeding. No underlying fracture.  2.  No acute traumatic solid organ or hollow viscus injury in the chest, abdomen, or pelvis.  3.  Faint, ill-defined peribronchial opacities in the right lower lobe may reflect an area of mild infiltrate.  4.  Cirrhotic appearing liver. No suspicious liver lesion.      IMP/PLAN:   (S30.0XXD) Contusion of lower back and pelvis, subsequent encounter    (R07.89) Right-sided chest wall pain  Comment: injuries from fall a couple of weeks ago     Plan: monitor hematoma   Pain management with local measures, prn acetaminophen       (R53.81) Physical deconditioning  (R29.6) Repeated falls  (R42) Dizziness and giddiness  Comment: falls likely multifactorial with peripheral  neuropathy, dizziness, deconditioning  Plan: PHYSICAL THERAPY / OCCUPATIONAL THERAPY for transfers, balance, gait, strengthening  [place]charge [place] apartment.       (D62) Acute posthemorrhagic anemia  Comment: hgb on 5/12>>>>erna at 8.3, now trending back up   Plan: follow PRN       (I48.20) Chronic atrial fibrillation (H)  Comment:   Pulse Readings from Last 4 Encounters:   06/22/23 75   06/19/23 76   06/16/23 70   06/10/23 76      Plan: not on rate-slowing medications   Warfarin for stroke prophylaxis:  INR 1.7 today   Warfarin 2.5 mg /day x 3.75 mg on Friday and Sunday   Recheck INR on 6/26     (E11.22,  N18.31) Type 2 diabetes mellitus with stage 3a chronic kidney disease, without long-term current use of insulin (H)  Comment:   Lab Results   Component Value Date    A1C 6.1 05/12/2023      Plan: glipizide 2.5 mg bid     (G47.33) Obstructive sleep apnea (adult) (pediatric)  Comment: has her CPAP here   Plan: home settings     (I11.0,  I50.32) Hypertensive heart disease with chronic diastolic congestive heart failure (H)  Comment:   BP Readings from Last 3 Encounters:   06/22/23 137/67   06/19/23 135/64   06/16/23 (!) 166/72      Plan: indapamide 1.25 mg/day   Follow bps and BMP     Jessica Treviño MD

## 2023-06-22 NOTE — LETTER
"    6/22/2023        RE: Savanna Rehman  [address]  [facility] [address]        Savanna Rehmna is a 82 year old female seen June 22, 2023 at [facility] where she was admitted after [facility] hospitalization 6/13-16 following a fall at home in her bathroom.   Pt was initially seen in the ED on 6/10 which was 5 days after the fall.   Noted to have a large right buttock hematoma, other imaging unremarkable.   She returned home, but then hospitalized 6/13-16 for right sided chest wall pain and tenderness.  Mobility impaired.   She was admitted for pain management and TCU placement.        Today pt is seen in her room sitting edge of bed.     \"I thought I was having a heart attack  [place] like it was on fire.\"   Fall on a Monday a week before she went to ED first time.  Couldn't stand up, called EMTs but declined transport, then did go to hospital 2 days later.      Pain is much better but still there.  Voltaren gel not helping.   \"My 5th fall this year   My legs are palsied and I have neuropathy on bottom of my feet.\"       Many recent problems:  C diff for 13 months until November   Kidney stones, cirrhosis (HANNA, sees GI)    Sees Dr Lauren for primary care.     Past Medical History:   Diagnosis Date     Anemia     Iron Deficiency anemia     Atrial fibrillation (H)      CAD (coronary artery disease)     non-obstructive     Chronic pain     neck, low back, legs     Congestive heart failure (H)      Degenerative disk disease      Diabetes (H)      Fibromyalgia      Gastro-oesophageal reflux disease      Gout      Hiatal hernia      Mumps      Neuropathy      CRISTIANA (obstructive sleep apnea) - CPAP      Palpitations      Pernicious anemia      Sleep apnea     uses CPAP.     Urinary incontinence      Vitamin D deficiency        Past Surgical History:   Procedure Laterality Date     APPENDECTOMY       CHOLECYSTECTOMY       COLONOSCOPY  3/15/2011     COLONOSCOPY N/A " 3/15/2023    Procedure: COLONOSCOPY, WITH POLYPECTOMY AND BIOPSY;  Surgeon: Maci Coronel MD;  Location:  GI     COLONOSCOPY N/A 3/15/2023    Procedure: COLONOSCOPY, FLEXIBLE, WITH LESION REMOVAL USING SNARE;  Surgeon: Maci Coronel MD;  Location:  GI     CORONARY ANGIOGRAPHY ADULT ORDER       CYSTOSCOPY, BIOPSY BLADDER, COMBINED N/A 2020    Procedure: CYSTOSCOPY, WITH BLADDER BIOPSY;  Surgeon: Deisy Wilson MD;  Location: [facility] OR     HEART CATH LEFT HEART CATH  16    medication management     HYSTERECTOMY TOTAL ABDOMINAL       Knee replacement NOS Left      LAPAROSCOPIC NISSEN FUNDOPLICATION N/A 2015    Procedure: LAPAROSCOPIC NISSEN FUNDOPLICATION;  Surgeon: Armando Ansari MD;  Location:  OR     TONSILLECTOMY       TRANSPOSITION ULNAR NERVE (ELBOW)         Family History   Problem Relation Age of Onset     Alzheimer Disease Mother      Lung Cancer Father      No Known Problems Brother      No Known Problems Brother      No Known Problems Brother      Unknown/Adopted No family hx of        Social History     Tobacco Use     Smoking status: Former     Packs/day: 0.50     Years: 50.00     Pack years: 25.00     Types: Cigarettes     Quit date: 2014     Years since quittin.8     Passive exposure: Past     Smokeless tobacco: Never   Substance Use Topics     Alcohol use: Yes     Comment: couple a month      SH:  [place], [place] apartment in [place]  Moved here from [place] 8 years ago  Daughter Roya     Review Of Systems  Skin: negative   Eyes: impaired vision, glasses  Ears/Nose/Throat: hearing loss  Respiratory: No shortness of breath, dyspnea on exertion, cough, or hemoptysis  Cardiovascular: negative  Gastrointestinal: FERREIRA, obesity  Genitourinary: incontinence  Musculoskeletal: frequent falls, ambulatory within her apartment but uses an electric scooter for distances     Currently independent with transfers and bed mobility, ambulates 82' with FWW  "and supervision.   Cass Tijerina  Neurologic: neuropathy, SLUMS 26/30  Psychiatric: negative  Hematologic/Lymphatic/Immunologic: anemia  Endocrine: DM2     GENERAL APPEARANCE: alert and no distress  /67   Pulse 75   Temp 97.1  F (36.2  C)   Resp 20   Ht 1.727 m (5' 8\")   Wt 114.9 kg (253 lb 6.4 oz)   LMP  (LMP Unknown)   SpO2 99%   BMI 38.53 kg/m     HEENT: normocephalic, no lesion or abnormalities  NECK: no adenopathy, no asymmetry, masses, or scars and thyroid normal to palpation  RESP: lungs clear to auscultation - no rales, rhonchi or wheezes; good air movement   CV: irregular rate and rhythm, normal S1 S2, I/VI HSM  ABDOMEN: obese, soft, nontender, no HSM or masses and bowel sounds normal  MS: extremities with 1+ magali,a   Large tense hematoma of right buttock, +bruising   SKIN: no suspicious lesions or rashes  NEURO: Normal strength and tone, sensory exam grossly normal, and speech normal  PSYCH: affect okay  LYMPHATICS: No cervical,  or supraclavicular nodes    Lab Results   Component Value Date     06/15/2023    POTASSIUM 3.9 06/15/2023    CHLORIDE 101 06/15/2023    CO2 29 06/15/2023    ANIONGAP 8 06/15/2023     (H) 06/16/2023    BUN 16.6 06/15/2023    CR 0.91 06/15/2023    GFRESTIMATED 63 06/15/2023    SAUL 8.8 06/15/2023     Lab Results   Component Value Date    AST 31 06/10/2023      ALBUMIN 3.5 06/10/2023      ALKPHOS 107 06/10/2023     Lab Results   Component Value Date    WBC 5.6 06/19/2023      HGB 10.1 06/19/2023      MCV 98 06/19/2023       06/19/2023     TSH   Date Value Ref Range Status   05/12/2023 2.23 0.30 - 4.20 uIU/mL Final   11/07/2022 0.76 0.40 - 4.00 mU/L Final   02/22/2021 1.37 0.40 - 4.00 mU/L Final       Vit D level 11 on 6/2/2023  B12 level 563       CT CHEST/ABDOMEN/PELVIS W CONTRAST   6/10/2023   INDICATION: fell Monday, massive hematoma R buttock, across midline, proximal thigh  LUNGS AND PLEURA: No pleural effusion or pneumothorax. No pulmonary " contusion. Faint, ill-defined peribronchial opacities laterally in the right lower lobe measuring up to 2 mm (4/214) could reflect some mild infiltrate. No significant pulmonary nodule or mass.  CORONARY ARTERY CALCIFICATION: Moderate.  HEPATOBILIARY: Mild nodularity of the liver surface compatible with cirrhosis. No hepatic contusion or laceration. No suspicious liver lesion. Cholecystectomy. No significant biliary dilation.  PANCREAS: Diffuse fatty atrophy of the pancreas. No acute peripancreatic inflammation.  SPLEEN: Borderline spleen size measuring 13.2 cm. No splenic contusion or laceration. No perisplenic fluid.  VASCULATURE: Normal caliber abdominal aorta. Aortoiliac atheromatous disease.  PELVIC ORGANS: Hysterectomy. No adnexal mass.  MUSCULOSKELETAL: Mixed attenuating fluid collection in the right buttock measuring 9.1 x 15.9 x 15.7 cm compatible with reported hematoma. No convincing active contrast extravasation within the limitations of a single phase exam. Area of more central   hypoattenuation may be some liquefied clot. Adjacent soft tissue edema extending from the right lumbar region to the upper right thigh. No underlying fractures. Skeletal degenerative changes.                                                              IMPRESSION:  1.  Large right buttock hematoma. No convincing active bleeding. No underlying fracture.  2.  No acute traumatic solid organ or hollow viscus injury in the chest, abdomen, or pelvis.  3.  Faint, ill-defined peribronchial opacities in the right lower lobe may reflect an area of mild infiltrate.  4.  Cirrhotic appearing liver. No suspicious liver lesion.      IMP/PLAN:   (S30.0XXD) Contusion of lower back and pelvis, subsequent encounter    (R07.89) Right-sided chest wall pain  Comment: injuries from fall a couple of weeks ago     Plan: monitor hematoma   Pain management with local measures, prn acetaminophen       (R53.81) Physical deconditioning  (R29.6) Repeated  falls  (R42) Dizziness and giddiness  Comment: falls likely multifactorial with peripheral neuropathy, dizziness, deconditioning  Plan: PHYSICAL THERAPY / OCCUPATIONAL THERAPY for transfers, balance, gait, strengthening  [place] apartment.       (D62) Acute posthemorrhagic anemia  Comment: hgb on 5/12>>>>erna at 8.3, now trending back up   Plan: follow PRN       (I48.20) Chronic atrial fibrillation (H)  Comment:   Pulse Readings from Last 4 Encounters:   06/22/23 75   06/19/23 76   06/16/23 70   06/10/23 76      Plan: not on rate-slowing medications   Warfarin for stroke prophylaxis:  INR 1.7 today   Warfarin 2.5 mg /day x 3.75 mg on Friday and Sunday   Recheck INR on 6/26     (E11.22,  N18.31) Type 2 diabetes mellitus with stage 3a chronic kidney disease, without long-term current use of insulin (H)  Comment:   Lab Results   Component Value Date    A1C 6.1 05/12/2023      Plan: glipizide 2.5 mg bid     (G47.33) Obstructive sleep apnea (adult) (pediatric)  Comment: has her CPAP here   Plan: home settings     (I11.0,  I50.32) Hypertensive heart disease with chronic diastolic congestive heart failure (H)  Comment:   BP Readings from Last 3 Encounters:   06/22/23 137/67   06/19/23 135/64   06/16/23 (!) 166/72      Plan: indapamide 1.25 mg/day   Follow bps and BMP     Jessica Treviño MD         Sincerely,        Jessica Treviño MD

## 2023-06-26 ENCOUNTER — TRANSITIONAL CARE UNIT VISIT (OUTPATIENT)
Dept: GERIATRICS | Facility: CLINIC | Age: 83
End: 2023-06-26
Payer: COMMERCIAL

## 2023-06-26 VITALS
DIASTOLIC BLOOD PRESSURE: 62 MMHG | TEMPERATURE: 97.1 F | RESPIRATION RATE: 18 BRPM | BODY MASS INDEX: 38.4 KG/M2 | WEIGHT: 253.4 LBS | HEART RATE: 66 BPM | HEIGHT: 68 IN | SYSTOLIC BLOOD PRESSURE: 111 MMHG | OXYGEN SATURATION: 94 %

## 2023-06-26 DIAGNOSIS — I11.0 HYPERTENSIVE HEART DISEASE WITH CHRONIC DIASTOLIC CONGESTIVE HEART FAILURE (H): ICD-10-CM

## 2023-06-26 DIAGNOSIS — I50.32 HYPERTENSIVE HEART DISEASE WITH CHRONIC DIASTOLIC CONGESTIVE HEART FAILURE (H): ICD-10-CM

## 2023-06-26 DIAGNOSIS — B37.2 SKIN YEAST INFECTION: Primary | ICD-10-CM

## 2023-06-26 DIAGNOSIS — E66.01 MORBID (SEVERE) OBESITY DUE TO EXCESS CALORIES (H): ICD-10-CM

## 2023-06-26 DIAGNOSIS — S30.0XXD CONTUSION OF LOWER BACK AND PELVIS, SUBSEQUENT ENCOUNTER: ICD-10-CM

## 2023-06-26 DIAGNOSIS — I48.91 ATRIAL FIBRILLATION, UNSPECIFIED TYPE (H): ICD-10-CM

## 2023-06-26 DIAGNOSIS — R29.6 REPEATED FALLS: ICD-10-CM

## 2023-06-26 DIAGNOSIS — Z79.01 LONG TERM CURRENT USE OF ANTICOAGULANT THERAPY: ICD-10-CM

## 2023-06-26 DIAGNOSIS — S70.11XD CONTUSION OF RIGHT THIGH, SUBSEQUENT ENCOUNTER: ICD-10-CM

## 2023-06-26 DIAGNOSIS — D62 ACUTE POSTHEMORRHAGIC ANEMIA: ICD-10-CM

## 2023-06-26 DIAGNOSIS — R53.81 PHYSICAL DECONDITIONING: ICD-10-CM

## 2023-06-26 PROBLEM — M62.81 MUSCLE WEAKNESS (GENERALIZED): Status: ACTIVE | Noted: 2023-06-26

## 2023-06-26 PROCEDURE — 99309 SBSQ NF CARE MODERATE MDM 30: CPT | Performed by: NURSE PRACTITIONER

## 2023-06-26 RX ORDER — FLUCONAZOLE 150 MG/1
150 TABLET ORAL ONCE
COMMUNITY
Start: 2023-06-26 | End: 2023-06-29

## 2023-06-26 RX ORDER — BENZOCAINE/MENTHOL 6 MG-10 MG
LOZENGE MUCOUS MEMBRANE 3 TIMES DAILY
COMMUNITY
Start: 2023-06-19 | End: 2023-06-29

## 2023-06-26 RX ORDER — NYSTATIN AND TRIAMCINOLONE ACETONIDE 100000; 1 [USP'U]/G; MG/G
CREAM TOPICAL 3 TIMES DAILY
COMMUNITY
Start: 2023-06-26 | End: 2023-06-29

## 2023-06-26 NOTE — PROGRESS NOTES
[facility]  [place] Medical Record Number:  [id number]  Place of Service where encounter took place:  [facility] ([facility]) [[id number]]  Chief Complaint   Patient presents with     Nursing Home Acute       HPI:    Savanna Rehman  is a 82 year old (1940), who is being seen today for an episodic care visit.  HPI information obtained from: facility chart records, facility staff, patient report and [facility] chart review. Today's concern is: has severe rash under breasts.     Skin yeast infection  Repeated falls  Contusion of lower back and pelvis, subsequent encounter  Contusion of right thigh, subsequent encounter  Physical deconditioning  Acute posthemorrhagic anemia  Atrial fibrillation, unspecified type (H)  Long term current use of anticoagulant therapy  Hypertensive heart disease with chronic diastolic congestive heart failure (H)  Morbid (severe) obesity due to excess calories (H)     Past Medical and Surgical History reviewed in Epic today.    MEDICATIONS:     Current Outpatient Medications   Medication Sig Dispense Refill     fluconazole (DIFLUCAN) 150 MG tablet Take 150 mg by mouth once       hydrocortisone (CORTAID) 1 % external cream Apply topically 3 times daily Mix with the nystatin cream and apply under breast for 3 days and then  bid x 4 days. Call PCP if not improved.       nystatin-triamcinolone (MYCOLOG II) 651324-9.1 UNIT/GM-% external cream Apply topically 3 times daily Mix with 1% HC cream and apply TID under breasts for 3 days then bid for 4 days.  Call PCP if not improved.       ACE/ARB/ARNI NOT PRESCRIBED (INTENTIONAL) Please choose reason not prescribed, below       acetaminophen (TYLENOL) 325 MG tablet Take 3 tablets (975 mg) by mouth every 8 hours as needed for mild pain       blood glucose (NO BRAND SPECIFIED) lancets standard Use to test blood sugar  as directed. 1 each 0     blood glucose (NO BRAND SPECIFIED) lancing device Device  to be used with lancets. Patient requests Amol Microlet 2 lancing device to check blood glucose once per day. 1 each 0     blood glucose (ONETOUCH ULTRA) test strip Use to test blood sugar 1 times daily 100 strip 1     blood glucose calibration (NO BRAND SPECIFIED) solution Use to calibrate blood glucose monitor 1 Bottle 3     blood glucose monitoring (NO BRAND SPECIFIED) meter device kit Use to test blood sugar 1 time daily 1 kit 0     butalbital-aspirin-caffeine (FIORINAL) -40 MG capsule TAKE 1 CAPSULE BY MOUTH EVERY 6 HOURS AS NEEDED FOR HEADACHE 15 capsule 0     EPINEPHrine (ANY BX GENERIC EQUIV) 0.3 MG/0.3ML injection 2-pack INJECT 0.3MLS (0.3MG) INTO THE MUSCLE ONCE AS NEEDED FOR ANAPHYLAXIS 2 each 1     famotidine (PEPCID) 20 MG tablet Take 20 mg by mouth daily       glipiZIDE (GLUCOTROL XL) 2.5 MG 24 hr tablet Take 1 tablet (2.5 mg) by mouth 2 times daily 180 tablet 3     indapamide (LOZOL) 1.25 MG tablet Take 1 tablet (1.25 mg) by mouth every morning 30 tablet 0     loperamide (IMODIUM A-D) 2 MG tablet Take 2 mg by mouth 4 times daily as needed for diarrhea       meclizine (ANTIVERT) 12.5 MG tablet TAKE 1 TABLET BY MOUTH THREE TIMES DAILY AS NEEDED FOR DIZZINESS 30 tablet 1     METAMUCIL FIBER PO Take 2 tablets by mouth daily       polyethylene glycol (MIRALAX) 17 g packet Take 17 g by mouth daily as needed for constipation       senna-docusate (SENOKOT-S/PERICOLACE) 8.6-50 MG tablet Take 1 tablet by mouth 2 times daily as needed for constipation       sertraline (ZOLOFT) 100 MG tablet Take 1 tablet (100 mg) by mouth daily 90 tablet 1     Vitamin D3 (CHOLECALCIFEROL) 125 MCG (5000 UT) tablet Take 125 mcg by mouth daily       warfarin ANTICOAGULANT (COUMADIN) 2.5 MG tablet Take 2.5-3.75 mg by mouth See Admin Instructions 6/26/23: INR 1.7  give 3.75mg today  Then in am resume home routine:  3.75 mg Sunday, Wednesday, Friday  2.5 mg all other days of the week  Check INR on 6/29/23       TODAY DURING  "EXAM/ROS:  No CP, SOB, Cough,  HA, N/V, dysuria or Bowel Abnormalities. Appetite is good.  No pain except mild bottom/thigh area. Said bilat rash under breasts very uncomfortable.    Objective:  /62   Pulse 66   Temp 97.1  F (36.2  C)   Resp 18   Ht 1.727 m (5' 8\")   Wt 114.9 kg (253 lb 6.4 oz)   LMP  (LMP Unknown)   SpO2 94%   BMI 38.53 kg/m    Exam:  GENERAL APPEARANCE:  Alert, in no distress, oriented, morbidly obese, cooperative  ENT:  Mouth with moist mucous membranes, normal hearing acuity  EYES:  Conjunctiva and lids normal  RESP:  respiratory effort of chest normal, lungs CTA and decreased mildly.   CV:  Auscultation of heart done, IRRR, no murmur. Trace pitting edema below knees, +2 pedal pulses  ABDOMEN:  normal bowel sounds, soft, nontender, obese  M/S:   AGUILAR and up with walker and therapies in room.  SKIN:  Inspection of skin at baseline except: severe rash under bilat breasts--po left--a bit of blood on rash. Also  the bruising lower back/upper buttock fading--improved.  NEURO:   Cranial nerves are grossly intact and  at patient's baseline  PSYCH:  oriented X 3, normal insight, judgement and memory      Labs:   Hemoglobin   Date Value Ref Range Status   06/19/2023 10.1 (L) 11.7 - 15.7 g/dL Final   06/16/2023 9.0 (L) 11.7 - 15.7 g/dL Final   02/22/2021 14.7 11.7 - 15.7 g/dL Final   12/04/2020 15.2 11.7 - 15.7 g/dL Final     Recent Labs   Lab Test 06/15/23  0509 06/14/23  0722    137   POTASSIUM 3.9 3.8   CHLORIDE 101 99   CO2 29 29   ANIONGAP 8 9   * 145*   BUN 16.6 17.1   CR 0.91 1.01*   SAUL 8.8 8.8    < > = values in this interval not displayed.     ASSESSMENT / PLAN:  (B37.2) Skin yeast infection  (primary encounter diagnosis)  Comment/Plan: keep as dry as able.   diflucan x1 dose. Nystatin and HC cream, monitor    (R29.6) Repeated falls   (S30.0XXD) Contusion of lower back and pelvis, subsequent encounter   (S70.11XD) Contusion of right thigh, subsequent encounter   " (R53.81) Physical deconditioning  Comment/Plan: stronger, is improved, LCD at end of week. Cont with  PT OT    (D62) Acute posthemorrhagic anemia  Comment/Plan: improved last week, check prn.    (I48.91) Atrial fibrillation, unspecified type (H)   (Z79.01) Long term current use of anticoagulant therapy  Comment/Plan: INR 1/7 today,  Cont coumadin, check INR on 6/29    (I11.0,  I50.32) Hypertensive heart disease with chronic diastolic congestive heart failure (H)  Comment/Plan: SBP runs 110-130--occas higher, cont with same meds, monitor.    (E66.01) Morbid (severe) obesity due to excess calories (H)  Comment/Plan: Unfortunately pt's weight is clinically significant and contributes adversely to current health issues/co-morbidities.  Patient would benefit strongly from wt loss after discharge to improve overall health.      Electronically signed by:  BATSHEVA Pelletier CNP

## 2023-06-27 ENCOUNTER — PATIENT OUTREACH (OUTPATIENT)
Dept: CARE COORDINATION | Facility: CLINIC | Age: 83
End: 2023-06-27
Payer: COMMERCIAL

## 2023-06-27 NOTE — PROGRESS NOTES
Clinic Care Coordination Contact  Care Team Conversations    SWCC received e-mail from TC[facility]abrina HILLMAN sharing patient will likely discharge home 6/30/2023 with homecare via [employer] for PT,OT, and HHA. No further CC needs identified at this time. SWCC will attempt to reach patient within 1-2 business days following discharge to establish contact and offer care management services.    ANA LUISA Pozo/[facility] Hospital[facility]ARAVANAN  Social Work Care Coordinator  [facility], [place], and [place]  Phone: [phone]

## 2023-06-28 ENCOUNTER — TELEPHONE (OUTPATIENT)
Dept: INTERNAL MEDICINE | Facility: CLINIC | Age: 83
End: 2023-06-28
Payer: COMMERCIAL

## 2023-06-28 ENCOUNTER — MEDICAL CORRESPONDENCE (OUTPATIENT)
Dept: HEALTH INFORMATION MANAGEMENT | Facility: CLINIC | Age: 83
End: 2023-06-28

## 2023-06-29 ENCOUNTER — DISCHARGE SUMMARY NURSING HOME (OUTPATIENT)
Dept: GERIATRICS | Facility: CLINIC | Age: 83
End: 2023-06-29
Payer: COMMERCIAL

## 2023-06-29 VITALS
OXYGEN SATURATION: 96 % | HEIGHT: 68 IN | DIASTOLIC BLOOD PRESSURE: 59 MMHG | SYSTOLIC BLOOD PRESSURE: 129 MMHG | BODY MASS INDEX: 38.4 KG/M2 | RESPIRATION RATE: 19 BRPM | WEIGHT: 253.4 LBS | TEMPERATURE: 97.5 F | HEART RATE: 74 BPM

## 2023-06-29 DIAGNOSIS — R07.89 RIGHT-SIDED CHEST WALL PAIN: ICD-10-CM

## 2023-06-29 DIAGNOSIS — R53.81 PHYSICAL DECONDITIONING: ICD-10-CM

## 2023-06-29 DIAGNOSIS — I50.32 HYPERTENSIVE HEART DISEASE WITH CHRONIC DIASTOLIC CONGESTIVE HEART FAILURE (H): ICD-10-CM

## 2023-06-29 DIAGNOSIS — K21.9 GASTRO-ESOPHAGEAL REFLUX DISEASE WITHOUT ESOPHAGITIS: ICD-10-CM

## 2023-06-29 DIAGNOSIS — R19.5 LOOSE STOOLS: ICD-10-CM

## 2023-06-29 DIAGNOSIS — Z86.19 HISTORY OF CLOSTRIDIUM DIFFICILE COLITIS: ICD-10-CM

## 2023-06-29 DIAGNOSIS — R42 DIZZINESS AND GIDDINESS: ICD-10-CM

## 2023-06-29 DIAGNOSIS — I48.20 CHRONIC ATRIAL FIBRILLATION (H): ICD-10-CM

## 2023-06-29 DIAGNOSIS — D62 ACUTE POSTHEMORRHAGIC ANEMIA: ICD-10-CM

## 2023-06-29 DIAGNOSIS — G47.33 OBSTRUCTIVE SLEEP APNEA (ADULT) (PEDIATRIC): ICD-10-CM

## 2023-06-29 DIAGNOSIS — E55.9 VITAMIN D DEFICIENCY: ICD-10-CM

## 2023-06-29 DIAGNOSIS — Z79.01 ANTICOAGULATED ON COUMADIN: ICD-10-CM

## 2023-06-29 DIAGNOSIS — S30.0XXD CONTUSION OF LOWER BACK AND PELVIS, SUBSEQUENT ENCOUNTER: ICD-10-CM

## 2023-06-29 DIAGNOSIS — E11.22 TYPE 2 DIABETES MELLITUS WITH STAGE 3A CHRONIC KIDNEY DISEASE, WITHOUT LONG-TERM CURRENT USE OF INSULIN (H): ICD-10-CM

## 2023-06-29 DIAGNOSIS — I11.0 HYPERTENSIVE HEART DISEASE WITH CHRONIC DIASTOLIC CONGESTIVE HEART FAILURE (H): ICD-10-CM

## 2023-06-29 DIAGNOSIS — S70.11XD CONTUSION OF RIGHT THIGH, SUBSEQUENT ENCOUNTER: ICD-10-CM

## 2023-06-29 DIAGNOSIS — R29.6 REPEATED FALLS: Primary | ICD-10-CM

## 2023-06-29 DIAGNOSIS — N18.31 TYPE 2 DIABETES MELLITUS WITH STAGE 3A CHRONIC KIDNEY DISEASE, WITHOUT LONG-TERM CURRENT USE OF INSULIN (H): ICD-10-CM

## 2023-06-29 PROBLEM — R74.8 ABNORMAL LIVER ENZYMES: Status: ACTIVE | Noted: 2023-06-29

## 2023-06-29 PROCEDURE — 99316 NF DSCHRG MGMT 30 MIN+: CPT | Performed by: NURSE PRACTITIONER

## 2023-06-29 RX ORDER — AMOXICILLIN 250 MG
1 CAPSULE ORAL DAILY PRN
Status: ON HOLD | COMMUNITY
End: 2023-09-21

## 2023-06-29 NOTE — PROGRESS NOTES
[facility] DISCHARGE SUMMARY    PATIENT'S NAME: Savanna Rehman  YOB: 1940    PRIMARY CARE PROVIDER AND CLINIC RESPONSIBLE AFTER TRANSFER: Patti Suárez     CODE STATUS: Full Code    TRANSFERRING PROVIDERS: Prabha Delgadillo, BATSHEVA RAMIREZ, Dr. Jessica Treviño MD      DATE OF SNF ADMISSION:  June / 16 / 2023.    DATE OF SNF DISCHARGE (including anticipating DC): June / 30 / 2023    DISCHARGE DISPOSITION: [facility] Provider    Nursing Facility: [facility] stay 6/13/23 to 6/16/23.     Condition on Discharge:  Improving.    Function:  Ambulates:179 ft w/4ww,Transfers: mod1  Cognitive Scores: SLUMS 26/30    Physical Function: Tinetti Balance Assessment: 22/40   Equipment: walker  DME: Walker    DISCHARGE DIAGNOSIS:      Repeated falls  Dizziness and giddiness  Right-sided chest wall pain  Contusion of lower back and pelvis, subsequent encounter  Contusion of right thigh, subsequent encounter  Physical deconditioning  Acute posthemorrhagic anemia  Chronic atrial fibrillation (H)  Anticoagulated on Coumadin  Hypertensive heart disease with chronic diastolic congestive heart failure (H)  Type 2 diabetes mellitus with stage 3a chronic kidney disease, without long-term current use of insulin (H)  Obstructive sleep apnea (adult) (pediatric)  Gastro-esophageal reflux disease without esophagitis  Vitamin D deficiency  Loose stools  History of Clostridium difficile colitis        HOSPITAL COURSE:   Very pleasant lady who fell about a week ago and went to hospital with right sided chest pain.. she had a large hematoma on her buttock, hip and pelvic areas. She is on coumadin. She was worked and (-) for MI or PE. Pain better with Voltaren gel. She has a chronic dizziness and was given meclizine by PCP in past but had not tried it.. she lives in a [address]+ apt and uses  scooter for distances.  She has CRISTIANA but does not stanislav CPAP at home  consistently per hospital notes. Hgb dropped from 11.2 in May to  8.3 in hospital. She was sent for rehab.    TCU/SNF COURSE: has progressed with PT and OT--scant pain, better mobility. INR running a bit low still but we are cautiously increasing the coumadin. INR today is 1.5.  Accuchecks are low to mid 100's      PAST MEDICAL HISTORY:  Past Medical History:   Diagnosis Date     Anemia     Iron Deficiency anemia     Atrial fibrillation (H)      CAD (coronary artery disease)     non-obstructive     Chronic pain     neck, low back, legs     Congestive heart failure (H)      Degenerative disk disease      Diabetes (H)      Fibromyalgia      Gastro-oesophageal reflux disease      Gout      Hiatal hernia      Mumps      Neuropathy      CRISTIANA (obstructive sleep apnea) - CPAP      Palpitations      Pernicious anemia      Sleep apnea     uses CPAP.     Urinary incontinence      Vitamin D deficiency        DISCHARGE MEDICATIONS:  Current Outpatient Medications   Medication Sig Dispense Refill     acetaminophen (TYLENOL) 325 MG tablet Take 3 tablets (975 mg) by mouth every 8 hours as needed for mild pain       blood glucose (NO BRAND SPECIFIED) lancets standard Use to test blood sugar  as directed. 1 each 0     blood glucose (NO BRAND SPECIFIED) lancing device Device to be used with lancets. Patient requests Caring in Placeet 2 lancing device to check blood glucose once per day. 1 each 0     blood glucose (ONETOUCH ULTRA) test strip Use to test blood sugar 1 times daily 100 strip 1     blood glucose calibration (NO BRAND SPECIFIED) solution Use to calibrate blood glucose monitor 1 Bottle 3     blood glucose monitoring (NO BRAND SPECIFIED) meter device kit Use to test blood sugar 1 time daily 1 kit 0     butalbital-aspirin-caffeine (FIORINAL) -40 MG capsule TAKE 1 CAPSULE BY MOUTH EVERY 6 HOURS AS NEEDED FOR HEADACHE 15 capsule 0     EPINEPHrine (ANY BX GENERIC EQUIV) 0.3 MG/0.3ML injection 2-pack INJECT 0.3MLS (0.3MG) INTO  "THE MUSCLE ONCE AS NEEDED FOR ANAPHYLAXIS 2 each 1     famotidine (PEPCID) 20 MG tablet Take 20 mg by mouth daily       glipiZIDE (GLUCOTROL XL) 2.5 MG 24 hr tablet Take 1 tablet (2.5 mg) by mouth 2 times daily 180 tablet 3     indapamide (LOZOL) 1.25 MG tablet Take 1 tablet (1.25 mg) by mouth every morning 30 tablet 0     loperamide (IMODIUM A-D) 2 MG tablet Take 2 mg by mouth 4 times daily as needed for diarrhea       meclizine (ANTIVERT) 12.5 MG tablet TAKE 1 TABLET BY MOUTH THREE TIMES DAILY AS NEEDED FOR DIZZINESS 30 tablet 1     METAMUCIL FIBER PO Take 2 tablets by mouth daily       polyethylene glycol (MIRALAX) 17 g packet Take 17 g by mouth daily as needed for constipation       senna-docusate (SENOKOT-S/PERICOLACE) 8.6-50 MG tablet Take 1 tablet by mouth daily as needed for constipation       sertraline (ZOLOFT) 100 MG tablet Take 1 tablet (100 mg) by mouth daily 90 tablet 1     Vitamin D3 (CHOLECALCIFEROL) 125 MCG (5000 UT) tablet Take 125 mcg by mouth daily       warfarin ANTICOAGULANT (COUMADIN) 2.5 MG tablet Take 2.5-3.75 mg by mouth See Admin Instructions 6/29/23: INR 1.5   give 5mg today then resume the regimen below:  6/26/23: INR 1.7  give 3.75mg today  Then in am resume home routine:  3.75 mg Sunday, Wednesday, Friday  2.5 mg all other days of the week  Check INR on 6/29/23       MED REC REQUIRED  Post Medication Reconciliation  Status:  Discharge medications reconciled and changed, see notes/orders       MEDICATION CHANGES/RATIONALE:   none  /59   Pulse 74   Temp 97.5  F (36.4  C)   Resp 19   Ht 1.727 m (5' 8\")   Wt 114.9 kg (253 lb 6.4 oz)   LMP  (LMP Unknown)   SpO2 96%   BMI 38.53 kg/m      TODAY DURING EXAM/ROS:  No CP, SOB, Cough, dizziness, fevers, chills, HA, N/V, dysuria or Bowel Abnormalities. Appetite is good.  No pain except mild in right thigh/buttock.  The rash under breast better with current treatment      PHYSICAL EXAM Today:  A & O x 3, NAD. Lungs CTA, non labored. " RRR, S1/S2 w/o murmur,gallop or rub.  Trace bilat pedal edema.  Abdomen soft, nontender, +BT'S. No focal neurological deficits. SKIN: bruising nearly gone on right side, skin under breast with rash is better, not red but pink and healing.       SNF LABS  Recent Labs   Lab Test 06/15/23  0509 06/14/23  0722    137   POTASSIUM 3.9 3.8   CHLORIDE 101 99   CO2 29 29   ANIONGAP 8 9   * 145*   BUN 16.6 17.1   CR 0.91 1.01*   SAUL 8.8 8.8    < > = values in this interval not displayed.     Hemoglobin   Date Value Ref Range Status   06/19/2023 10.1 (L) 11.7 - 15.7 g/dL Final   06/16/2023 9.0 (L) 11.7 - 15.7 g/dL Final   02/22/2021 14.7 11.7 - 15.7 g/dL Final   12/04/2020 15.2 11.7 - 15.7 g/dL Final             DISCHARGE PLAN:  Occupational Therapy, Physical Therapy, Home Health Aide and From:  [facility] Follow-up with PCP in 7 days: 7 days.    Current [place] or other scheduled appointments:  Future Appointments   Date Time Provider Department Center   10/12/2023 11:00 AM Deisy Wilson MD [facility] referral needed and placed by this provider: none    Pending labs: none     Discharge Treatments:none         TOTAL DISCHARGE TIME:   Greater than 30 minutes    BATSHEVA [facility]              Documentation of Face-to-Face and Certification for Home Health Services     Patient: Savanna Rehman   YOB: 1940  MR Number: [id number]  Today's Date: 6/28/2023    I certify that patient: Savanna Rehman is under my care and that I, or a nurse practitioner or physician's assistant working with me, had a face-to-face encounter that meets the physician face-to-face encounter requirements with this patient on: 6/29/2023.    This encounter with the patient was in whole, or in part, for the following medical condition, which is the primary reason for home health care:    Repeated falls  Dizziness and giddiness  Right-sided chest wall pain  Contusion of  lower back and pelvis, subsequent encounter  Contusion of right thigh, subsequent encounter  Physical deconditioning  Acute posthemorrhagic anemia  Chronic atrial fibrillation (H)  Anticoagulated on Coumadin  Hypertensive heart disease with chronic diastolic congestive heart failure (H)  Type 2 diabetes mellitus with stage 3a chronic kidney disease, without long-term current use of insulin (H)  Obstructive sleep apnea (adult) (pediatric)  Gastro-esophageal reflux disease without esophagitis  Vitamin D deficiency  Loose stools  History of Clostridium difficile colitis  .    I certify that, based on my findings, the following services are medically necessary home health services: Occupational Therapy and Physical Therapy.    My clinical findings support the need for the above services because: Occupational Therapy Services are needed to assess and treat cognitive ability and address ADL safety due to impairment in deconditioning safety eval and mobility in apt-fall risk . and Physical Therapy Services are needed to assess and treat the following functional impairments: deconditioning, see under OT and diagnoses.    Further, I certify that my clinical findings support that this patient is homebound (i.e. absences from home require considerable and taxing effort and are for medical reasons or Sabian[facility] services or infrequently or of short duration when for other reasons) because: Requires assistance of another person or specialized equipment to access medical services because patient: Is unable to walk greater than 179 feet without rest...    Based on the above findings. I certify that this patient is confined to the home and needs intermittent skilled nursing care, physical therapy and/or speech therapy.  The patient is under my care, and I have initiated the establishment of the plan of care.  This patient will be followed by a physician who will periodically review the plan of care.  Physician/Provider to provide  follow up care: Patti Suárez    Responsible Medicare certified PEC[employer] Physician: Prabha Delgadillo RN, CNP  Physician Signature: See electronic signature associated with these discharge orders.  Date: 6/28/2023

## 2023-06-30 ENCOUNTER — MEDICAL CORRESPONDENCE (OUTPATIENT)
Dept: HEALTH INFORMATION MANAGEMENT | Facility: CLINIC | Age: 83
End: 2023-06-30
Payer: COMMERCIAL

## 2023-07-03 ENCOUNTER — TELEPHONE (OUTPATIENT)
Dept: INTERNAL MEDICINE | Facility: CLINIC | Age: 83
End: 2023-07-03
Payer: COMMERCIAL

## 2023-07-03 ENCOUNTER — ANTICOAGULATION THERAPY VISIT (OUTPATIENT)
Dept: ANTICOAGULATION | Facility: CLINIC | Age: 83
End: 2023-07-03
Payer: COMMERCIAL

## 2023-07-03 ENCOUNTER — MEDICAL CORRESPONDENCE (OUTPATIENT)
Dept: HEALTH INFORMATION MANAGEMENT | Facility: CLINIC | Age: 83
End: 2023-07-03

## 2023-07-03 DIAGNOSIS — I48.20 CHRONIC ATRIAL FIBRILLATION (H): ICD-10-CM

## 2023-07-03 DIAGNOSIS — I48.21 PERMANENT ATRIAL FIBRILLATION (H): Primary | ICD-10-CM

## 2023-07-03 LAB
INR (EXTERNAL): 1.5 (ref 0.9–1.1)
INR (EXTERNAL): 1.7 (ref 0.9–1.1)
INR (EXTERNAL): 2.2 (ref 0.9–1.1)

## 2023-07-03 NOTE — TELEPHONE ENCOUNTER
[facility] calling because they did a start of care. They will do medication set up and INR. Her certification period is from 72/23 - 8/30/23 SKILLED NURSING PHYSICAL, THERAPY and OCCUPATIONAL THERAPY.    [facility] [phone]. Ok to leave a detailed message.

## 2023-07-03 NOTE — PROGRESS NOTES
ANTICOAGULATION MANAGEMENT     Savanna Rehman 82 year old female is on warfarin with therapeutic INR result. (Goal INR 2.0-3.0)    Recent labs: (last 7 days)     07/02/23  0857   INR 2.2*       ASSESSMENT       Source(s): Chart Review and Home Care/Facility Nurse       Warfarin doses taken: Patient received more warfarin than maintenance dose while in TCU    Diet: No new diet changes identified    Medication/supplement changes: None noted    New illness, injury, or hospitalization: Yes: patient was hospitalized 6/13/23-6/16/23 for chest pain, then went to TCU due to family concerns for falls and patient's safety. Patient was discharged with home care on 6/29/23    Signs or symptoms of bleeding or clotting: Patient has a hematoma on her right buttocks that was noted during her hospital stay on 6/13/23. Patient reported to home care nurse that she believes this is improving. Patient will continue to monitor and report any worsening symptoms.     Previous result: Subtherapeutic    Additional findings: If INR is low on 7/7/23, will need to consider increasing maintenance dosing.    Based on transitions of care back home, potential change in diet or other interventions while at TCU- decision made not to adjust maintenance dose at this time. Will see how return home effects INR.         PLAN     Recommended plan for temporary change(s) affecting INR     Dosing Instructions: Continue your current warfarin dose with next INR in 5 days       Summary  As of 7/3/2023    Full warfarin instructions:  3.75 mg every Sun, Wed, Fri; 2.5 mg all other days   Next INR check:  7/7/2023             Telephone call with [facility] home care nurse who agrees to plan and repeated back plan correctly    Orders given to  Homecare nurse/facility to recheck    Education provided:     Please call back if any changes to your diet, medications or how you've been taking warfarin    Symptom monitoring: monitoring for bleeding signs and symptoms and  monitoring for clotting signs and symptoms    Contact [phone]  with any changes, questions or concerns.     Plan made with [facility] Pharmacist Jayleen River RN  Anticoagulation Clinic  7/3/2023    _______________________________________________________________________     Anticoagulation Episode Summary     Current INR goal:  2.0-3.0   TTR:  57.6 % (1 y)   Target end date:  Indefinite   Send INR reminders to:  [facility]    Indications    Permanent atrial fibrillation (H) [I48.21]  Chronic atrial fibrillation (H) [I48.20]  Long term (current) use of anticoagulants (Resolved) [Z79.01]           Comments:  Home care          Anticoagulation Care Providers     Provider Role Specialty Phone number    Patti Suárez MD Referring Internal Medicine [phone]

## 2023-07-05 ENCOUNTER — TELEPHONE (OUTPATIENT)
Dept: INTERNAL MEDICINE | Facility: CLINIC | Age: 83
End: 2023-07-05
Payer: COMMERCIAL

## 2023-07-05 NOTE — TELEPHONE ENCOUNTER
Left detailed voicemail message for [facility] giving approval of requested orders. BENITA Graham R.N.

## 2023-07-06 ENCOUNTER — TRANSFERRED RECORDS (OUTPATIENT)
Dept: HEALTH INFORMATION MANAGEMENT | Facility: CLINIC | Age: 83
End: 2023-07-06
Payer: COMMERCIAL

## 2023-07-07 ENCOUNTER — PATIENT OUTREACH (OUTPATIENT)
Dept: CARE COORDINATION | Facility: CLINIC | Age: 83
End: 2023-07-07

## 2023-07-07 ENCOUNTER — ANTICOAGULATION THERAPY VISIT (OUTPATIENT)
Dept: ANTICOAGULATION | Facility: CLINIC | Age: 83
End: 2023-07-07

## 2023-07-07 ENCOUNTER — TELEPHONE (OUTPATIENT)
Dept: INTERNAL MEDICINE | Facility: CLINIC | Age: 83
End: 2023-07-07

## 2023-07-07 DIAGNOSIS — I48.20 CHRONIC ATRIAL FIBRILLATION (H): ICD-10-CM

## 2023-07-07 DIAGNOSIS — I48.21 PERMANENT ATRIAL FIBRILLATION (H): Primary | ICD-10-CM

## 2023-07-07 DIAGNOSIS — Z53.9 DIAGNOSIS NOT YET DEFINED: Primary | ICD-10-CM

## 2023-07-07 LAB — INR (EXTERNAL): 1.9 (ref 0.9–1.1)

## 2023-07-07 PROCEDURE — 99207 PR MD CERTIFICATION HHA PATIENT: CPT | Performed by: INTERNAL MEDICINE

## 2023-07-07 PROCEDURE — G0180 MD CERTIFICATION HHA PATIENT: HCPCS | Performed by: INTERNAL MEDICINE

## 2023-07-07 NOTE — PROGRESS NOTES
Clinic Care Coordination Contact  [facility]    Clinical Data: Care Coordinator Outreach  Outreach attempted x 1. Briefly spoke with patient whom requested a call back next week due to home PT currently visiting with patient. [facility] validated patients request.    Plan: Care Coordinator will try to reach patient again in 1-2 business days.    ANA LUISA [facility]  Social Work Care Coordinator  [facility] - [place], [place], and [place]  Phone: [phone]

## 2023-07-07 NOTE — TELEPHONE ENCOUNTER
[facility] * INR reading info received via fax     Forms in Dr. mail box for review and signature.

## 2023-07-07 NOTE — PROGRESS NOTES
ANTICOAGULATION MANAGEMENT     Savanna Rehman 82 year old female is on warfarin with subtherapeutic INR result. (Goal INR 2.0-3.0)    Recent labs: (last 7 days)     07/07/23  1346   INR 1.9*       ASSESSMENT       Source(s): Chart Review and Home Care/Facility Nurse       Warfarin doses taken: Warfarin taken as instructed    Diet: eating better since home     Medication/supplement changes: None noted    New illness, injury, or hospitalization: No    Signs or symptoms of bleeding or clotting: No    Previous result: Therapeutic last visit; previously outside of goal range    Additional findings: None         PLAN     Recommended plan for temporary change(s) affecting INR     Dosing Instructions: Increase your warfarin dose (5.9% change) with next INR in 1 week       Summary  As of 7/7/2023    Full warfarin instructions:  2.5 mg every Mon, Thu, Sat; 3.75 mg all other days   Next INR check:  7/14/2023             Telephone call with [facility] home care nurse who verbalizes understanding and agrees to plan and who agrees to plan and repeated back plan correctly    Orders given to  Homecare nurse/facility to recheck    Education provided:     Contact [phone]  with any changes, questions or concerns.     Plan made per [facility] anticoagulation protocol    Sanjana Fox RN  Anticoagulation Clinic  7/7/2023    _______________________________________________________________________     Anticoagulation Episode Summary     Current INR goal:  2.0-3.0   TTR:  57.3 % (1 y)   Target end date:  Indefinite   Send INR reminders to:  [facility]    Indications    Permanent atrial fibrillation (H) [I48.21]  Chronic atrial fibrillation (H) [I48.20]  Long term (current) use of anticoagulants (Resolved) [Z79.01]           Comments:  Home care          Anticoagulation Care Providers     Provider Role Specialty Phone number    Patti Suárez MD Referring Internal Medicine [phone]

## 2023-07-09 DIAGNOSIS — H81.10 BENIGN PAROXYSMAL POSITIONAL VERTIGO, UNSPECIFIED LATERALITY: ICD-10-CM

## 2023-07-10 ENCOUNTER — TELEPHONE (OUTPATIENT)
Dept: INTERNAL MEDICINE | Facility: CLINIC | Age: 83
End: 2023-07-10
Payer: COMMERCIAL

## 2023-07-10 DIAGNOSIS — H81.10 BENIGN PAROXYSMAL POSITIONAL VERTIGO, UNSPECIFIED LATERALITY: ICD-10-CM

## 2023-07-10 RX ORDER — MECLIZINE HCL 12.5 MG 12.5 MG/1
TABLET ORAL
Qty: 30 TABLET | Refills: 0 | Status: SHIPPED | OUTPATIENT
Start: 2023-07-10 | End: 2024-01-12

## 2023-07-10 RX ORDER — MECLIZINE HCL 12.5 MG 12.5 MG/1
12.5 TABLET ORAL 3 TIMES DAILY PRN
Qty: 30 TABLET | Refills: 0 | OUTPATIENT
Start: 2023-07-10

## 2023-07-10 NOTE — TELEPHONE ENCOUNTER
Fax received from [facility] 07/10/23 for review and signature.  Put in Dr. Lauren's in basket.

## 2023-07-10 NOTE — TELEPHONE ENCOUNTER
Medication refill request denied due to duplicate refill request.    Medication(s) last filled on 07/10/2023.    Dawood Ortez, Triage RN [facility]  3:43 PM 7/10/2023

## 2023-07-11 ENCOUNTER — MEDICAL CORRESPONDENCE (OUTPATIENT)
Dept: HEALTH INFORMATION MANAGEMENT | Facility: CLINIC | Age: 83
End: 2023-07-11

## 2023-07-11 NOTE — PROGRESS NOTES
Clinic Care Coordination Contact  [facility]    Clinical Data: Care Coordinator Outreach  Outreach attempted x 1. Briefly spoke with patient whom requested a call back later due to homecare currently visiting with patient. [facility] offered to contact patient 7/12. Patient in agreement with plan.   Plan: Care Coordinator will try to reach patient again in 1-2 business days.    ANA LUISA Pozo/[facility]  Social Work Care Coordinator  [facility] [place], [place], and [place]  Phone: [phone]

## 2023-07-12 ENCOUNTER — PATIENT OUTREACH (OUTPATIENT)
Dept: CARE COORDINATION | Facility: CLINIC | Age: 83
End: 2023-07-12
Payer: COMMERCIAL

## 2023-07-12 ENCOUNTER — TELEPHONE (OUTPATIENT)
Dept: INTERNAL MEDICINE | Facility: CLINIC | Age: 83
End: 2023-07-12
Payer: COMMERCIAL

## 2023-07-12 ASSESSMENT — ACTIVITIES OF DAILY LIVING (ADL): DEPENDENT_IADLS:: CLEANING;COOKING;LAUNDRY;SHOPPING;MEAL PREPARATION;TRANSPORTATION

## 2023-07-12 NOTE — TELEPHONE ENCOUNTER
Fax received from [facility] - INR 07/07/23 for review and signature.  Put in Dr. Lauren;'s in basket.

## 2023-07-12 NOTE — PROGRESS NOTES
Clinic Care Coordination Contact    Clinic Care Coordination Contact  OUTREACH    Referral Information:  Referral Source: SNF/TCU  Primary Diagnosis: Psychosocial    Chief Complaint   Patient presents with     Clinic Care Coordination - Initial     Post TCU discharge assessment.       Universal Utilization: Reviewed 7/12/2023  Clinic Utilization  Difficulty keeping appointments:: No  Compliance Concerns: No  No-Show Concerns: No  No PCP office visit in Past Year: No  Utilization    Hospital Admissions  2             ED Visits  3             No Show Count (past year)  3                Current as of: 7/12/2023 10:18 AM            Clinical Concerns:  Current Medical Concerns:    Patient Active Problem List   Diagnosis     Angioedema     Morbid obesity (H)     CRISTIANA (obstructive sleep apnea)      Recurrent UTI     Urgency-frequency syndrome     Urgency incontinence     Candidiasis of skin     Chronic atrial fibrillation (H)     Permanent atrial fibrillation (H)     Hyperlipidemia LDL goal <100     Vitamin D deficiency     Diabetes mellitus, type 2 (H)     Ascending aorta dilatation (H)     Nonrheumatic tricuspid valve regurgitation     Anemia     Gastroesophageal reflux disease     Gout     Chronic kidney disease, stage 3     C. difficile colitis, recurrent -- she needs to take Vanco 125 bid anytime she is on a different Abx      Bony pelvic pain     Adjustment disorder with anxiety     Adjustment disorder with mixed anxiety and depressed mood     Abnormal feces     Diverticular disease of colon     Iron deficiency anemia     Loose stools     Lower abdominal pain     Noninfectious gastroenteritis     Elevated troponin     Anemia due to blood loss, acute     Subtherapeutic international normalized ratio (INR)     Traumatic hematoma of buttock, subsequent encounter     Chest pain, unspecified type     Repeated falls     Right-sided chest wall pain     Contusion of right thigh, subsequent encounter     Dizziness and  giddiness     Physical deconditioning     Acute posthemorrhagic anemia     Gastro-esophageal reflux disease without esophagitis     History of Clostridium difficile colitis     Contusion of lower back and pelvis, subsequent encounter     Muscle weakness (generalized)     Atrial fibrillation, unspecified type (H)     Long term current use of anticoagulant therapy     Morbid (severe) obesity due to excess calories (H)     Hypertensive heart disease with chronic diastolic congestive heart failure (H)     Skin yeast infection     Abnormal liver enzymes   Current Behavioral Concerns: none noted     Education Provided to patient: SWCC role and reason for call.    Pain  Pain (GOAL):: No  Health Maintenance Reviewed: Due/Overdue   Health Maintenance Due   Topic Date Due     HF ACTION PLAN  Never done     ZOSTER IMMUNIZATION (1 of 2) Never done     MEDICARE ANNUAL WELLNESS VISIT  11/30/2021     DIABETIC FOOT EXAM  11/30/2021     COVID-19 Vaccine (5 - Pfizer series) 06/27/2022     MICROALBUMIN  10/08/2022   Clinical Pathway: None    Medication Management:  Medication review status: Medications reviewed and no changes reported per patient.        Current Outpatient Medications   Medication     acetaminophen (TYLENOL) 325 MG tablet     blood glucose (NO BRAND SPECIFIED) lancets standard     blood glucose (NO BRAND SPECIFIED) lancing device     blood glucose (ONETOUCH ULTRA) test strip     blood glucose calibration (NO BRAND SPECIFIED) solution     blood glucose monitoring (NO BRAND SPECIFIED) meter device kit     butalbital-aspirin-caffeine (FIORINAL) -40 MG capsule     EPINEPHrine (ANY BX GENERIC EQUIV) 0.3 MG/0.3ML injection 2-pack     famotidine (PEPCID) 20 MG tablet     glipiZIDE (GLUCOTROL XL) 2.5 MG 24 hr tablet     indapamide (LOZOL) 1.25 MG tablet     loperamide (IMODIUM A-D) 2 MG tablet     meclizine (ANTIVERT) 12.5 MG tablet     METAMUCIL FIBER PO     polyethylene glycol (MIRALAX) 17 g packet     senna-docusate  (SENOKOT-S/PERICOLACE) 8.6-50 MG tablet     sertraline (ZOLOFT) 100 MG tablet     Vitamin D3 (CHOLECALCIFEROL) 125 MCG (5000 UT) tablet     warfarin ANTICOAGULANT (COUMADIN) 2.5 MG tablet     No current facility-administered medications for this visit.     Facility-Administered Medications Ordered in Other Visits   Medication     nitroglycerin 100 MCG/ML injection     Functional Status:  Dependent ADLs:: Ambulation-walker  Dependent IADLs:: Cleaning, Cooking, Laundry, Shopping, Meal Preparation, Transportation  Bed or wheelchair confined:: No  Mobility Status: Independent w/Device    Living Situation:  Current living arrangement:: I live alone  Type of residence:: Independent Senior Living    Lifestyle & Psychosocial Needs:  Social Determinants of Health     Tobacco Use: Medium Risk (6/29/2023)    Patient History      Smoking Tobacco Use: Former      Smokeless Tobacco Use: Never      Passive Exposure: Past   Alcohol Use: Not At Risk (1/27/2022)    AUDIT-C      Frequency of Alcohol Consumption: Monthly or less      Average Number of Drinks: 1 or 2      Frequency of Binge Drinking: Never   Financial Resource Strain: Medium Risk (1/27/2022)    Overall Financial Resource Strain (CARDIA)      Difficulty of Paying Living Expenses: Somewhat hard   Food Insecurity: No Food Insecurity (1/27/2022)    Hunger Vital Sign      Worried About Running Out of Food in the Last Year: Never true      Ran Out of Food in the Last Year: Never true   Transportation Needs: Unmet Transportation Needs (1/27/2022)    PRAPARE - Transportation      Lack of Transportation (Medical): Yes      Lack of Transportation (Non-Medical): Yes   Physical Activity: Not on file   Stress: Stress Concern Present (1/27/2022)    [place] [place] [employer] - Occupational Stress Questionnaire      Feeling of Stress : Very much   Social Connections: Not on file   Intimate Partner Violence: Not At Risk (1/27/2022)    Humiliation, Afraid, Rape, and Kick  questionnaire      Fear of Current or Ex-Partner: No      Emotionally Abused: No      Physically Abused: No      Sexually Abused: No   Depression: Not at risk (5/12/2023)    PHQ-2      PHQ-2 Score: 0   Recent Concern: Depression - At risk (3/22/2023)    PHQ-2      PHQ-2 Score: 3   Housing Stability: Low Risk  (1/27/2022)    Housing Stability Vital Sign      Unable to Pay for Housing in the Last Year: No      Number of Places Lived in the Last Year: 1      Unstable Housing in the Last Year: No     Diet:: Diabetic diet  Inadequate nutrition (GOAL):: No  Tube Feeding: No  Inadequate activity/exercise (GOAL):: No  Significant changes in sleep pattern (GOAL): No  Transportation means:: [facility], Family     Anglican or spiritual beliefs that impact treatment:: No  Mental health DX:: Yes  Mental health DX how managed:: None  Mental health management concern (GOAL):: No  Chemical Dependency Status: No Current Concerns  Informal Support system:: Children, Family    [facility] spoke with patient to introduce self and offer Care Coordination services due to recent TCU discharge. Patient shared she discharged home over the 4th of July and had limited support due to holiday. Patient shared now that the holiday is over she has adequate support. Patient has homecare services for PT, OT, and RN. Patient voiced she also has meal delivery services and transportation support from family/friends. Patient also working with [facility]  for in-home housekeeping services. Patient states that she has all the support and resources needed at this time and denied any additional resources. Reviewed medications and upcoming appointments, no needs identified. Patient shared she had a wonderful experience with hospital staff while admitted and voiced thankfulness for care received. Allowed time and space for patient to voice any additional questions/concerns. Patient shared she lost her purse while admitted at [facility].  Patient informed writer she contacted security on Monday 7/10/2023 and was told she would receive a return call next business day, however, she has yet to hear from them regarding above. Patient shared she has been unable to reconnect due to multiple transfers with last attempt. Offered to provide patient with contact information for [facility] non-emergent line ([phone]), patient in agreement with plan. Patient plans to follow-up with security regarding missing belongings. No further CC needs identified. Patient voiced preference is to contact CC if/when additional needs arise.      Resources and Interventions:  Current Resources:   Skilled Home Care Services: Skilled Nursing, Physical Therapy, Occupational Therapy  Community Resources: County Worker, Transportation Services, County Programs, Housekeeping/Chore Agency  Supplies Currently Used at Home: Incontinence Supplies  Equipment Currently Used at Home: walker, rolling, other (see comments), grab bar, tub/shower, grab bar, toilet, shower chair  Employment Status: retired  Advance Care Plan/Directive  Advanced Care Plans/Directives on file:: Yes  Type Advanced Care Plans/Directives: POLST  Referrals Placed: None     Care Plan: None    Future Appointments              In 6 days Patti Sáurez MD [place], [place]    In 3 months Deisy Wilson MD [facility]        Plan: Patient was provided with  CC's contact information and encouraged to call with questions, concerns, and/or support needs.  CC will remain available as needed. No further care coordination outreaches will be made at this time.     ANA LUISA Pozo/LICSARAVANAN  Social Work Care Coordinator  [facility], [place], and [place]  Phone: [phone]

## 2023-07-13 ENCOUNTER — HOSPITAL ENCOUNTER (EMERGENCY)
Facility: CLINIC | Age: 83
Discharge: HOME | End: 2023-07-13
Attending: EMERGENCY MEDICINE | Admitting: EMERGENCY MEDICINE
Payer: COMMERCIAL

## 2023-07-13 ENCOUNTER — TELEPHONE (OUTPATIENT)
Dept: INTERNAL MEDICINE | Facility: CLINIC | Age: 83
End: 2023-07-13
Payer: COMMERCIAL

## 2023-07-13 VITALS
BODY MASS INDEX: 38.47 KG/M2 | TEMPERATURE: 98.6 F | RESPIRATION RATE: 20 BRPM | SYSTOLIC BLOOD PRESSURE: 120 MMHG | HEART RATE: 60 BPM | WEIGHT: 253 LBS | DIASTOLIC BLOOD PRESSURE: 78 MMHG | OXYGEN SATURATION: 98 %

## 2023-07-13 DIAGNOSIS — S30.0XXA TRAUMATIC HEMATOMA OF BUTTOCK, INITIAL ENCOUNTER: ICD-10-CM

## 2023-07-13 DIAGNOSIS — R79.1 SUBTHERAPEUTIC INTERNATIONAL NORMALIZED RATIO (INR): ICD-10-CM

## 2023-07-13 LAB
ANION GAP SERPL CALCULATED.3IONS-SCNC: 8 MMOL/L (ref 7–15)
BASOPHILS # BLD AUTO: 0 10E3/UL (ref 0–0.2)
BASOPHILS NFR BLD AUTO: 1 %
BUN SERPL-MCNC: 15.7 MG/DL (ref 8–23)
CALCIUM SERPL-MCNC: 9.2 MG/DL (ref 8.8–10.2)
CHLORIDE SERPL-SCNC: 100 MMOL/L (ref 98–107)
CREAT SERPL-MCNC: 0.99 MG/DL (ref 0.51–0.95)
DEPRECATED HCO3 PLAS-SCNC: 28 MMOL/L (ref 22–29)
EOSINOPHIL # BLD AUTO: 0.2 10E3/UL (ref 0–0.7)
EOSINOPHIL NFR BLD AUTO: 3 %
ERYTHROCYTE [DISTWIDTH] IN BLOOD BY AUTOMATED COUNT: 14.6 % (ref 10–15)
GFR SERPL CREATININE-BSD FRML MDRD: 57 ML/MIN/1.73M2
GLUCOSE SERPL-MCNC: 138 MG/DL (ref 70–99)
HCT VFR BLD AUTO: 38.7 % (ref 35–47)
HGB BLD-MCNC: 12.3 G/DL (ref 11.7–15.7)
IMM GRANULOCYTES # BLD: 0 10E3/UL
IMM GRANULOCYTES NFR BLD: 0 %
INR PPP: 1.53 (ref 0.85–1.15)
LYMPHOCYTES # BLD AUTO: 1 10E3/UL (ref 0.8–5.3)
LYMPHOCYTES NFR BLD AUTO: 20 %
MCH RBC QN AUTO: 29.8 PG (ref 26.5–33)
MCHC RBC AUTO-ENTMCNC: 31.8 G/DL (ref 31.5–36.5)
MCV RBC AUTO: 94 FL (ref 78–100)
MONOCYTES # BLD AUTO: 0.7 10E3/UL (ref 0–1.3)
MONOCYTES NFR BLD AUTO: 13 %
NEUTROPHILS # BLD AUTO: 3.3 10E3/UL (ref 1.6–8.3)
NEUTROPHILS NFR BLD AUTO: 63 %
NRBC # BLD AUTO: 0 10E3/UL
NRBC BLD AUTO-RTO: 0 /100
PLATELET # BLD AUTO: 195 10E3/UL (ref 150–450)
POTASSIUM SERPL-SCNC: 3.9 MMOL/L (ref 3.4–5.3)
RBC # BLD AUTO: 4.13 10E6/UL (ref 3.8–5.2)
SODIUM SERPL-SCNC: 136 MMOL/L (ref 136–145)
WBC # BLD AUTO: 5.2 10E3/UL (ref 4–11)

## 2023-07-13 PROCEDURE — 99283 EMERGENCY DEPT VISIT LOW MDM: CPT

## 2023-07-13 PROCEDURE — 80048 BASIC METABOLIC PNL TOTAL CA: CPT | Performed by: EMERGENCY MEDICINE

## 2023-07-13 PROCEDURE — 85025 COMPLETE CBC W/AUTO DIFF WBC: CPT | Performed by: EMERGENCY MEDICINE

## 2023-07-13 PROCEDURE — 85610 PROTHROMBIN TIME: CPT | Performed by: EMERGENCY MEDICINE

## 2023-07-13 PROCEDURE — 36415 COLL VENOUS BLD VENIPUNCTURE: CPT | Performed by: EMERGENCY MEDICINE

## 2023-07-13 ASSESSMENT — ACTIVITIES OF DAILY LIVING (ADL): ADLS_ACUITY_SCORE: 35

## 2023-07-13 NOTE — ED TRIAGE NOTES
Pt was sent by her PCP for evaulation of cellulitis to her bottom. Pt states she fell a month ago and noticed swelling and warm to her back side for the past 4-5 days. ABCs intact and AOx4.       Triage Assessment     Row Name 07/13/23 3671       Triage Assessment (Adult)    Airway WDL WDL       Respiratory WDL    Respiratory WDL WDL       Cardiac WDL    Cardiac WDL WDL

## 2023-07-13 NOTE — TELEPHONE ENCOUNTER
Patient calling  She has a large red mary kay the size of a football on her buttocks. Patient states its hard put not painful to the touch. Patient is concerned its a blood clot and worried about the size. Please advise. Ok to call and ishmael [phone]

## 2023-07-13 NOTE — TELEPHONE ENCOUNTER
S-(situation): buttock swollen    B-(background): fell a couple weeks ago.  Patient went to TCU and has been seeing physical therapy and OCCUPATIONAL THERAPY.      A-(assessment): patient got a mirror and looked at area.  Stated area is hard and hot to touch.  Bruising is still developing and is up further on her waistline.  Worried about a blood clot.     R-(recommendations): spoke with primary care provider who recommended ER.      Called patient and relayed message from provider.

## 2023-07-13 NOTE — ED PROVIDER NOTES
"  History     Chief Complaint:  Bruise on bottom    HPI   Savanna Rehman is a 82 year old female anticoagulated on Warfarin with a history of a-fib, diabetes, congestive heart failure who presents today with a hematoma on her right buttock and concerns that it has \"changed shape\". Patient reports she fell 5 weeks ago and comes in today after PCP recommendation. Patient reports the area feeling hot and itchy but denies any increasing pain or any other new symptoms.. Patient also reports being lightheaded intermittently but this is not new. Denies any abnormal bleeding anywhere else. Patient has a INR check tomorrow.     Independent Historian:   None - Patient Only    Review of External Notes:   I reviewed outpatient clinic notes and telephone interactions from today.    Medications:    Florinal  Famotidine   Glipizide  Indapamide  Loperamide   Meclizine   Zoloft   Wafarin   Cholecalciferol     Past Medical History:    Anemia  A-fib  CAD  Congestive heart failure   Degenerative disk disease  Diabetes  Fibromyalgia  GERD  GOUT  Hiatal hernia   CRISTIANA  Vitamin D deficiency    Past Surgical History:    Appendectomy   Cholecystectomy   Colonoscopy x3  Coronary angiography   Cystoscopy, biopsy bladder, combined  Heart cath left  Hysterectomy  Knee replacement   Laparoscopic nissen fundoplication   Tonsillectomy  Transposition ulnar nerve     Physical Exam     Patient Vitals for the past 24 hrs:   BP Temp Temp src Pulse Resp SpO2 Weight   07/13/23 1826 120/64 -- -- 61 -- -- --   07/13/23 1453 110/79 98.4  F (36.9  C) Temporal 78 18 94 % 114.8 kg (253 lb)      Physical Exam  General: Elderly female sitting upright  MSK: Mild tenderness to the right buttock where there is a resolving hematoma that is firm to touch.  No fluctuance.  No significant warmth to touch compared to surrounding areas.  No spreading erythema.  Relates in the room unassisted.  Skin: Warm and dry.  As above, old appearing bruising noted over the right " buttock.  No concerning erythema noted.  Neuro: Alert and oriented. Responds appropriately to all questions and commands. No focal findings appreciated. Normal muscle tone.  Psych: Normal mood and affect. Pleasant.    Emergency Department Course   Laboratory:  Labs Ordered and Resulted from Time of ED Arrival to Time of ED Departure   BASIC METABOLIC PANEL - Abnormal       Result Value    Sodium 136      Potassium 3.9      Chloride 100      Carbon Dioxide (CO2) 28      Anion Gap 8      Urea Nitrogen 15.7      Creatinine 0.99 (*)     Calcium 9.2      Glucose 138 (*)     GFR Estimate 57 (*)    INR - Abnormal    INR 1.53 (*)    CBC WITH PLATELETS AND DIFFERENTIAL    WBC Count 5.2      RBC Count 4.13      Hemoglobin 12.3      Hematocrit 38.7      MCV 94      MCH 29.8      MCHC 31.8      RDW 14.6      Platelet Count 195      % Neutrophils 63      % Lymphocytes 20      % Monocytes 13      % Eosinophils 3      % Basophils 1      % Immature Granulocytes 0      NRBCs per 100 WBC 0      Absolute Neutrophils 3.3      Absolute Lymphocytes 1.0      Absolute Monocytes 0.7      Absolute Eosinophils 0.2      Absolute Basophils 0.0      Absolute Immature Granulocytes 0.0      Absolute NRBCs 0.0          Emergency Department Course & Assessments:     Assessments:  1802 I obtained history and examined the patient as noted above.   I reassessed the patient.  She denies any new concerns.  I discussed findings of today's evaluation.  She is agreeable with the plan for discharge home.    Independent Interpretation (X-rays, CTs, rhythm strip):  None    Social Determinants of Health affecting care:   None    Disposition:  The patient was discharged to home.     Impression & Plan    CMS Diagnoses: None    Medical Decision Making:  Savanna Severino is an 82-year-old female presents emergency department with concerns for changing shape and the hematoma of her right buttock.  It appears to be healing appropriately.  There is no current signs of  infection.  Her INR is subtherapeutic and she will discuss this tomorrow with her INR clinic.  She can be reassessed with her primary care provider early next week if she has ongoing concerns.  She is given return precautions including fever or increasing pain for which to return to the emergency department.  She feels comfortable to plan for discharge.  All questions were answered.    Diagnosis:    ICD-10-CM    1. Traumatic hematoma of buttock, initial encounter  S30.0XXA     resolving      2. Subtherapeutic international normalized ratio (INR)  R79.1             Scribe Disclosure:  IJurgen, am serving as a scribe at 6:17 PM on 7/13/2023 to document services personally performed by Kasey Hayes MD based on my observations and the provider's statements to me.     7/13/2023   Kasey Hayes MD [place], Tracy Dianne, MD  07/13/23 1954

## 2023-07-14 ENCOUNTER — TELEPHONE (OUTPATIENT)
Dept: INTERNAL MEDICINE | Facility: CLINIC | Age: 83
End: 2023-07-14

## 2023-07-14 ENCOUNTER — DOCUMENTATION ONLY (OUTPATIENT)
Dept: ANTICOAGULATION | Facility: CLINIC | Age: 83
End: 2023-07-14
Payer: COMMERCIAL

## 2023-07-14 ENCOUNTER — MEDICAL CORRESPONDENCE (OUTPATIENT)
Dept: HEALTH INFORMATION MANAGEMENT | Facility: CLINIC | Age: 83
End: 2023-07-14

## 2023-07-14 ENCOUNTER — TELEPHONE (OUTPATIENT)
Dept: ANTICOAGULATION | Facility: CLINIC | Age: 83
End: 2023-07-14
Payer: COMMERCIAL

## 2023-07-14 DIAGNOSIS — I48.21 PERMANENT ATRIAL FIBRILLATION (H): Primary | ICD-10-CM

## 2023-07-14 DIAGNOSIS — I48.20 CHRONIC ATRIAL FIBRILLATION (H): ICD-10-CM

## 2023-07-14 NOTE — TELEPHONE ENCOUNTER
Patient waited 5 hours and 45 minutes in the ER to be seen by a nurse and then another 1 hour and a half before she saw a doctor yesterday..  Patient states she does not have cellulitis.  Patients INR there was a 1.5.  Patient wanted to make certain that PCP saw all of the notes from 07/13 prior to her upcomming visit on 07/18/23.

## 2023-07-14 NOTE — PROGRESS NOTES
ANTICOAGULATION  MANAGEMENT: Discharge Review    Savanna Rehman chart reviewed for anticoagulation continuity of care    Emergency room visit on 7/13 for hematoma and sub INR.    Discharge disposition: Home with Home Care    Results:    Recent labs: (last 7 days)     07/07/23  1346 07/13/23  1854   INR 1.9* 1.53*     Anticoagulation inpatient management:     not applicable     Anticoagulation discharge instructions:     Warfarin dosing: home regimen continued   Bridging: No   INR goal change: No      Medication changes affecting anticoagulation: No    Additional factors affecting anticoagulation: No     PLAN     No adjustment to anticoagulation plan needed    Recommended follow up is scheduled today via home care    No adjustment to Anticoagulation Calendar was required    Sanjana Fox RN

## 2023-07-14 NOTE — DISCHARGE INSTRUCTIONS
Your hematoma is resolving.  There does not seem to be signs of secondary infection.  It appears to be healing appropriately.  Come here for reassessment should you notice fever or increasing pain.    Your INR is 1.53.  This is low for therapy.  You should discuss any changes in dosing of Coumadin/warfarin with your INR clinic or primary care provider.

## 2023-07-17 ENCOUNTER — TELEPHONE (OUTPATIENT)
Dept: INTERNAL MEDICINE | Facility: CLINIC | Age: 83
End: 2023-07-17
Payer: COMMERCIAL

## 2023-07-17 NOTE — TELEPHONE ENCOUNTER
Fax received from [facility] - INR 07/13/23 for review and signature.  Put in Dr. Lauren's in basket.

## 2023-07-18 ENCOUNTER — TELEPHONE (OUTPATIENT)
Dept: INTERNAL MEDICINE | Facility: CLINIC | Age: 83
End: 2023-07-18

## 2023-07-18 NOTE — TELEPHONE ENCOUNTER
Patient calls. She learned the [facility] can not come to pick her up today for her appointment today.    She is so upset and asked if there are any other options to be seen in person this week. If not, she finally agreed but is very reluctant,  to a phone appointment today if that is what Dr Lauren thinks is best     pls call to let her know what  decision is     Patient number [phone]

## 2023-07-20 ENCOUNTER — MEDICAL CORRESPONDENCE (OUTPATIENT)
Dept: HEALTH INFORMATION MANAGEMENT | Facility: CLINIC | Age: 83
End: 2023-07-20

## 2023-07-21 ENCOUNTER — ANTICOAGULATION THERAPY VISIT (OUTPATIENT)
Dept: ANTICOAGULATION | Facility: CLINIC | Age: 83
End: 2023-07-21
Payer: COMMERCIAL

## 2023-07-21 DIAGNOSIS — I48.21 PERMANENT ATRIAL FIBRILLATION (H): Primary | ICD-10-CM

## 2023-07-21 DIAGNOSIS — I48.20 CHRONIC ATRIAL FIBRILLATION (H): ICD-10-CM

## 2023-07-21 LAB — INR (EXTERNAL): 1.7 (ref 0.9–1.1)

## 2023-07-21 NOTE — PROGRESS NOTES
ANTICOAGULATION MANAGEMENT     Savanna Rehman 82 year old female is on warfarin with subtherapeutic INR result. (Goal INR 2.0-3.0)    Recent labs: (last 7 days)     07/21/23  1218   INR 1.7*       ASSESSMENT       Source(s): Chart Review and Home Care/Facility Nurse       Warfarin doses taken: Warfarin taken as instructed    Diet: No new diet changes identified    Medication/supplement changes: None noted    New illness, injury, or hospitalization: Yes: 7/13 ER visit for hematoma     Signs or symptoms of bleeding or clotting: No - resolved     Previous result: Subtherapeutic    Additional findings: None and missed appointment this week for INR recheck and hopsital f/u         PLAN     Recommended plan for temporary change(s) affecting INR     Dosing Instructions: booster dose then Increase your warfarin dose (5% change) with next INR in 1 week       Summary  As of 7/21/2023    Full warfarin instructions:  7/21: 5 mg; Otherwise 2.5 mg every Mon, Thu; 3.75 mg all other days   Next INR check:  7/28/2023             Telephone call with [facility] nurse who verbalizes understanding and agrees to plan and who agrees to plan and repeated back plan correctly    Orders given to  Homecare nurse/facility to recheck    Education provided:     Contact [phone]  with any changes, questions or concerns.     Plan made per [facility] anticoagulation protocol    Sanjana Fox RN  Anticoagulation Clinic  7/21/2023    _______________________________________________________________________     Anticoagulation Episode Summary     Current INR goal:  2.0-3.0   TTR:  53.4 % (1 y)   Target end date:  Indefinite   Send INR reminders to:  NIKKI CHASE    Indications    Permanent atrial fibrillation (H) [I48.21]  Chronic atrial fibrillation (H) [I48.20]  Long term (current) use of anticoagulants (Resolved) [Z79.01]           Comments:  Home care          Anticoagulation Care Providers     Provider Role Specialty Phone number     Patti Suárez MD Referring Internal Medicine [phone]

## 2023-07-24 DIAGNOSIS — I48.20 CHRONIC ATRIAL FIBRILLATION (H): Primary | ICD-10-CM

## 2023-07-24 DIAGNOSIS — R60.0 BILATERAL LEG EDEMA: ICD-10-CM

## 2023-07-25 ENCOUNTER — OFFICE VISIT (OUTPATIENT)
Dept: INTERNAL MEDICINE | Facility: CLINIC | Age: 83
End: 2023-07-25
Payer: COMMERCIAL

## 2023-07-25 VITALS
RESPIRATION RATE: 18 BRPM | HEART RATE: 78 BPM | BODY MASS INDEX: 36.37 KG/M2 | HEIGHT: 68 IN | SYSTOLIC BLOOD PRESSURE: 131 MMHG | DIASTOLIC BLOOD PRESSURE: 70 MMHG | WEIGHT: 240 LBS | TEMPERATURE: 98.1 F | OXYGEN SATURATION: 96 %

## 2023-07-25 DIAGNOSIS — L98.9 SKIN LESION: Primary | ICD-10-CM

## 2023-07-25 DIAGNOSIS — S30.0XXS TRAUMATIC HEMATOMA OF BUTTOCK, SEQUELA: ICD-10-CM

## 2023-07-25 DIAGNOSIS — K74.60 CIRRHOSIS OF LIVER WITHOUT ASCITES, UNSPECIFIED HEPATIC CIRRHOSIS TYPE (H): ICD-10-CM

## 2023-07-25 DIAGNOSIS — R60.0 BILATERAL LEG EDEMA: ICD-10-CM

## 2023-07-25 DIAGNOSIS — E55.9 VITAMIN D DEFICIENCY: ICD-10-CM

## 2023-07-25 PROCEDURE — 99214 OFFICE O/P EST MOD 30 MIN: CPT | Performed by: INTERNAL MEDICINE

## 2023-07-25 RX ORDER — MUPIROCIN 20 MG/G
OINTMENT TOPICAL 3 TIMES DAILY
Qty: 15 G | Refills: 0 | Status: ON HOLD | OUTPATIENT
Start: 2023-07-25 | End: 2023-09-21

## 2023-07-25 RX ORDER — INDAPAMIDE 1.25 MG/1
1.25 TABLET ORAL EVERY MORNING
Qty: 30 TABLET | Refills: 3 | Status: SHIPPED | OUTPATIENT
Start: 2023-07-25 | End: 2023-12-01

## 2023-07-25 ASSESSMENT — PAIN SCALES - GENERAL: PAINLEVEL: NO PAIN (0)

## 2023-07-25 NOTE — PROGRESS NOTES
Dr Lauren's note      Patient's instructions / PLAN:                                                        Plan:  Continue vitamin D 3 5000 units daily for a total of 3 months   2. Mupirocin ointment 3 times a day to the nose lesion  3. Continue same meds, same doses for now   4. Schedule in office appointment Aug 25        ASSESSMENT & PLAN:                                                      (L98.9) Skin lesion  (primary encounter diagnosis)  Comment: I suspect the small local infection  Plan: mupirocin (BACTROBAN) 2 % external ointment            (E55.9) Vitamin D deficiency  Comment:   Plan: Cholecalciferol (VITAMIN D-3) 125 MCG (5000 UT)        TABS            (R60.0) Bilateral leg edema  Comment:   Plan: indapamide (LOZOL) 1.25 MG tablet            (S30.0XXS) Traumatic hematoma of buttock, sequela  Comment:   Plan: Observation    (K74.60) Cirrhosis of liver without ascites, unspecified hepatic cirrhosis type (H)  Comment: Stable  Plan: Follow-up with GI       Chief complaint:                                                      Follow up chronic medical problems      SUBJECTIVE:                                                    History of present illness:    Nose lesion  -- on the R side  -- she has a scab  -- no erythema around  -- she states she had a similar skin lesion on the L ant chest and it healed after a while    L buttock hematoma  -- after a fall  -- getting better but still sore     Questions about diast CHF  -- echo 2021 -- LVEF 55-60    Bilat leg edema  -- PT advised her for compression socks  -- I advised her agsains compression socks because she can't put them on and if she tries hard she can have muscle sprain/ rupture and more hematoma    Vit D  questions  -- level at 11 on June 2023    Safety  -- she is grateful to PT and OT that they worked to make the shower and the toilet safer to use      Labs July 13 -- Discussed    -- CBC, BMP - nl    Check R foot  -- good pulse  -- the cyanosis is  recovering once the foot is elevated   -- venous stasis dermatitis         Cirrhosis  -- no symptoms   -- stage 3  -- no ever alcohol   -- eval by GI    Diabetes Follow-up    How often are you checking your blood sugar? A few times a week  What time of day are you checking your blood sugars (select all that apply)?  Before meals  Have you had any blood sugars above 200?  No  Have you had any blood sugars below 70?  No  What symptoms do you notice when your blood sugar is low?  None  What concerns do you have today about your diabetes? None   Do you have any of these symptoms? (Select all that apply)  No numbness or tingling in feet.  No redness, sores or blisters on feet.  No complaints of excessive thirst.  No reports of blurry vision.  No significant changes to weight.      BP Readings from Last 2 Encounters:   07/13/23 120/78   06/29/23 129/59     Hemoglobin A1C (%)   Date Value   05/12/2023 6.1 (H)   02/06/2023 7.3 (H)   02/22/2021 6.3 (H)   09/14/2020 6.4 (H)     LDL Cholesterol Calculated (mg/dL)   Date Value   11/07/2022 76   06/02/2022 100   02/22/2021 107 (H)   03/04/2020 111 (H)         How many servings of fruits and vegetables do you eat daily?  2-3  On average, how many sweetened beverages do you drink each day (Examples: soda, juice, sweet tea, etc.  Do NOT count diet or artificially sweetened beverages)?   1  How many days per week do you exercise enough to make your heart beat faster? 3 or less  How many minutes a day do you exercise enough to make your heart beat faster? 9 or less  How many days per week do you miss taking your medication? 0    Review of Systems:                                                      ROS: negative for fever, chills, cough, wheezes, chest pain, shortness of breath, vomiting, abdominal pain, leg swelling       OBJECTIVE:             Physical exam:  Blood pressure 131/70, pulse 78, temperature 98.1  F (36.7  C), temperature source Tympanic, resp. rate 18, height 1.727 m  "(5' 8\"), weight 108.9 kg (240 lb), SpO2 96 %, not currently breastfeeding.     NAD, appears comfortable  Skin: no rashes     Neck: supple, no JVD,  No thyroidmegaly. Lymph nodes nonpalpable cervical and supraclavicular.  Chest: clear to auscultation bilaterally, good respiratory effort  Heart: S1 S2, RRR, no mgr appreciated  Abdomen: soft, not tender, no hepatosplenomegaly or masses appreciated, no abdominal bruit, present bowel sounds  Extremities: no edema,   Neurologic: A, Ox3, no focal signs appreciated  R buttock -- very big hematoma ( 1/2 upper part) no signs of cellulitis,     PMHx: reviewed  Past Medical History:   Diagnosis Date    Anemia     Iron Deficiency anemia    Atrial fibrillation (H)     CAD (coronary artery disease)     non-obstructive    Chronic pain     neck, low back, legs    Congestive heart failure (H)     Degenerative disk disease     Diabetes (H)     Fibromyalgia     Gastro-oesophageal reflux disease     Gout     Hiatal hernia     Mumps     Neuropathy     CRISTIANA (obstructive sleep apnea) - CPAP     Palpitations     Pernicious anemia     Sleep apnea     uses CPAP.    Urinary incontinence     Vitamin D deficiency       PSHx: reviewed  Past Surgical History:   Procedure Laterality Date    APPENDECTOMY      CHOLECYSTECTOMY      COLONOSCOPY  3/15/2011    COLONOSCOPY N/A 3/15/2023    Procedure: COLONOSCOPY, WITH POLYPECTOMY AND BIOPSY;  Surgeon: Maci Coronel MD;  Location: [facility]    COLONOSCOPY N/A 3/15/2023    Procedure: COLONOSCOPY, FLEXIBLE, WITH LESION REMOVAL USING SNARE;  Surgeon: Maci Coronel MD;  Location: [facility]    CORONARY ANGIOGRAPHY ADULT ORDER      CYSTOSCOPY, BIOPSY BLADDER, COMBINED N/A 7/13/2020    Procedure: CYSTOSCOPY, WITH BLADDER BIOPSY;  Surgeon: Deisy Wilson MD;  Location: [facility] OR    HEART CATH LEFT HEART CATH  12/30/16    medication management    HYSTERECTOMY TOTAL ABDOMINAL      Knee replacement NOS Left     LAPAROSCOPIC NISSEN FUNDOPLICATION N/A " 2/4/2015    Procedure: LAPAROSCOPIC NISSEN FUNDOPLICATION;  Surgeon: Armando Ansari MD;  Location: [facility] OR    TONSILLECTOMY      TRANSPOSITION ULNAR NERVE (ELBOW)          Meds: reviewed  Current Outpatient Medications   Medication Sig Dispense Refill    acetaminophen (TYLENOL) 325 MG tablet Take 3 tablets (975 mg) by mouth every 8 hours as needed for mild pain      blood glucose (NO BRAND SPECIFIED) lancets standard Use to test blood sugar  as directed. 1 each 0    blood glucose (NO BRAND SPECIFIED) lancing device Device to be used with lancets. Patient requests Amol Microlet 2 lancing device to check blood glucose once per day. 1 each 0    blood glucose (ONETOUCH ULTRA) test strip Use to test blood sugar 1 times daily 100 strip 1    blood glucose calibration (NO BRAND SPECIFIED) solution Use to calibrate blood glucose monitor 1 Bottle 3    blood glucose monitoring (NO BRAND SPECIFIED) meter device kit Use to test blood sugar 1 time daily 1 kit 0    butalbital-aspirin-caffeine (FIORINAL) -40 MG capsule TAKE 1 CAPSULE BY MOUTH EVERY 6 HOURS AS NEEDED FOR HEADACHE 15 capsule 0    EPINEPHrine (ANY BX GENERIC EQUIV) 0.3 MG/0.3ML injection 2-pack INJECT 0.3MLS (0.3MG) INTO THE MUSCLE ONCE AS NEEDED FOR ANAPHYLAXIS 2 each 1    famotidine (PEPCID) 20 MG tablet Take 20 mg by mouth daily      glipiZIDE (GLUCOTROL XL) 2.5 MG 24 hr tablet Take 1 tablet (2.5 mg) by mouth 2 times daily 180 tablet 3    indapamide (LOZOL) 1.25 MG tablet Take 1 tablet (1.25 mg) by mouth every morning 30 tablet 0    loperamide (IMODIUM A-D) 2 MG tablet Take 2 mg by mouth 4 times daily as needed for diarrhea      meclizine (ANTIVERT) 12.5 MG tablet TAKE 1 TABLET BY MOUTH THREE TIMES DAILY AS NEEDED FOR DIZZINESS 30 tablet 0    METAMUCIL FIBER PO Take 2 tablets by mouth daily      polyethylene glycol (MIRALAX) 17 g packet Take 17 g by mouth daily as needed for constipation      senna-docusate (SENOKOT-S/PERICOLACE) 8.6-50 MG tablet Take 1  tablet by mouth daily as needed for constipation      sertraline (ZOLOFT) 100 MG tablet Take 1 tablet (100 mg) by mouth daily 90 tablet 1    Vitamin D3 (CHOLECALCIFEROL) 125 MCG (5000 UT) tablet Take 125 mcg by mouth daily      warfarin ANTICOAGULANT (COUMADIN) 2.5 MG tablet Take 2.5-3.75 mg by mouth See Admin Instructions 6/29/23: INR 1.5   give 5mg today then resume the regimen below:  6/26/23: INR 1.7  give 3.75mg today  Then in am resume home routine:  3.75 mg Sunday, Wednesday, Friday  2.5 mg all other days of the week  Check INR on 6/29/23         Soc Hx: reviewed  Fam Hx: reviewed      Chart documentation was completed, in part, with Flowbox voice-recognition software. Even though reviewed, some grammatical, spelling, and word errors may remain.      Patti Lauren MD  Internal Medicine

## 2023-07-25 NOTE — PATIENT INSTRUCTIONS
Plan:  Continue vitamin D 3 5000 units daily for a total of 3 months   2. Mupirocin ointment 3 times a day to the nose lesion  3. Continue same meds, same doses for now   4. Schedule in office appointment Aug 25

## 2023-07-26 RX ORDER — WARFARIN SODIUM 2.5 MG/1
TABLET ORAL
Qty: 125 TABLET | Refills: 1 | Status: ON HOLD | OUTPATIENT
Start: 2023-07-26 | End: 2023-09-23

## 2023-07-26 RX ORDER — INDAPAMIDE 1.25 MG/1
TABLET ORAL
Qty: 30 TABLET | Refills: 0 | OUTPATIENT
Start: 2023-07-26

## 2023-07-26 NOTE — TELEPHONE ENCOUNTER
Medication refill request denied due to duplicate refill request.    Medication(s) last filled on 07/25/2023.    Dawood Ortez, Triage RN [facility]  11:46 AM 7/26/2023

## 2023-07-26 NOTE — TELEPHONE ENCOUNTER
ANTICOAGULATION MANAGEMENT:  Medication Refill    Anticoagulation Summary  As of 7/21/2023      Warfarin maintenance plan:  2.5 mg (2.5 mg x 1) every Mon, Thu; 3.75 mg (2.5 mg x 1.5) all other days   Next INR check:  7/28/2023   Target end date:  Indefinite    Indications    Permanent atrial fibrillation (H) [I48.21]  Chronic atrial fibrillation (H) [I48.20]  Long term (current) use of anticoagulants (Resolved) [Z79.01]                 Anticoagulation Care Providers       Provider Role Specialty Phone number    Patti Suárez MD Referring Internal Medicine [phone]            Refill Criteria    Visit with referring provider/group: Meets criteria: office visit within referring provider group in the last 1 year on 7/25/23    [facility] referral signed last signed: 10/24/2022; within last year: Yes    Lab monitoring not exceeding 2 weeks overdue:  Yes    Savanna meets all criteria for refill. Rx instructions and quantity supplied updated to match patient's current dosing plan. Warfarin 90 day supply with 1 refill granted per [facility] protocol     Bella Arechiga RN  Anticoagulation Clinic

## 2023-07-28 ENCOUNTER — ANTICOAGULATION THERAPY VISIT (OUTPATIENT)
Dept: ANTICOAGULATION | Facility: CLINIC | Age: 83
End: 2023-07-28
Payer: COMMERCIAL

## 2023-07-28 ENCOUNTER — TELEPHONE (OUTPATIENT)
Dept: INTERNAL MEDICINE | Facility: CLINIC | Age: 83
End: 2023-07-28
Payer: COMMERCIAL

## 2023-07-28 DIAGNOSIS — K74.60 CIRRHOSIS OF LIVER WITHOUT ASCITES (H): ICD-10-CM

## 2023-07-28 DIAGNOSIS — I48.21 PERMANENT ATRIAL FIBRILLATION (H): Primary | ICD-10-CM

## 2023-07-28 DIAGNOSIS — R19.5 LOOSE STOOLS: Primary | ICD-10-CM

## 2023-07-28 DIAGNOSIS — I48.20 CHRONIC ATRIAL FIBRILLATION (H): ICD-10-CM

## 2023-07-28 DIAGNOSIS — R74.8 ABNORMAL LIVER ENZYMES: ICD-10-CM

## 2023-07-28 LAB — INR (EXTERNAL): 3 (ref 0.9–1.1)

## 2023-07-28 NOTE — TELEPHONE ENCOUNTER
Fax received from [facility] - Current Medications 07/28/23 for review and signature.  Put in Dr. Lauren's in basket.

## 2023-07-28 NOTE — PROGRESS NOTES
ANTICOAGULATION MANAGEMENT     Savanna Rehman 82 year old female is on warfarin with therapeutic INR result. (Goal INR 2.0-3.0)    Recent labs: (last 7 days)     07/28/23  1151   INR 3.0*       ASSESSMENT     Source(s): Chart Review and Home Care/Facility Nurse     Warfarin doses taken: Warfarin taken as instructed  Diet: No new diet changes identified  Medication/supplement changes: None noted  New illness, injury, or hospitalization: No, hematoma is remaining stable  Signs or symptoms of bleeding or clotting: No  Previous result: Subtherapeutic  Additional findings: None       PLAN     Recommended plan for no diet, medication or health factor changes affecting INR     Dosing Instructions: Continue your current warfarin dose with next INR in 1 week       Summary  As of 7/28/2023      Full warfarin instructions:  2.5 mg every Mon, Thu; 3.75 mg all other days   Next INR check:  8/4/2023               Telephone call with [facility] home care nurse who agrees to plan and repeated back plan correctly    Orders given to  Homecare nurse/facility to recheck    Education provided:   Please call back if any changes to your diet, medications or how you've been taking warfarin    Plan made per [facility] anticoagulation protocol    Roya Sky RN  Anticoagulation Clinic  7/28/2023    _______________________________________________________________________     Anticoagulation Episode Summary       Current INR goal:  2.0-3.0   TTR:  53.0 % (1 y)   Target end date:  Indefinite   Send INR reminders to:  NIKKI CHASE    Indications    Permanent atrial fibrillation (H) [I48.21]  Chronic atrial fibrillation (H) [I48.20]  Long term (current) use of anticoagulants (Resolved) [Z79.01]             Comments:  Home care              Anticoagulation Care Providers       Provider Role Specialty Phone number    Patti Suárez MD Referring Internal Medicine [phone]

## 2023-07-28 NOTE — TELEPHONE ENCOUNTER
Fax received from [facility] - INR and Medication List 07/27/23 for review and signature.  Put in Dr. Lauren's in basket.

## 2023-07-31 ENCOUNTER — TELEPHONE (OUTPATIENT)
Dept: INTERNAL MEDICINE | Facility: CLINIC | Age: 83
End: 2023-07-31
Payer: COMMERCIAL

## 2023-07-31 DIAGNOSIS — R21 RASH AND NONSPECIFIC SKIN ERUPTION: Primary | ICD-10-CM

## 2023-07-31 NOTE — TELEPHONE ENCOUNTER
"Patient states she was seen in office 7/25 and had MD look at lesions on face.  She was prescribed Mupirocin ointment to use on lesions three times daily. Patient states the rx seems to soothe the lesions slightly but does not seem to help them clear up or prevent new lesions.  She reports a total of 4 \"purple, puffy\" blister like lesions, one on side of nose, one inside nostril, one near inner corner of right eye and one on upper lip.  She is concerned about how she looks as she has a family reunion coming up this weekend.  What else can she try?  BENITA Graham R.N.      "

## 2023-07-31 NOTE — TELEPHONE ENCOUNTER
No pain, intermittent itching.  Onset 2 months ago.  Currently has lesions in the following:    Left chest  Left nostril  Right side of the nose  Right inner corner of eye  Lower lid right eye  Right side of upper lip

## 2023-08-01 ENCOUNTER — MEDICAL CORRESPONDENCE (OUTPATIENT)
Dept: HEALTH INFORMATION MANAGEMENT | Facility: CLINIC | Age: 83
End: 2023-08-01

## 2023-08-01 NOTE — TELEPHONE ENCOUNTER
[facility] Call Center    Phone Message    May a detailed message be left on voicemail: no     Reason for Call: Appointment Intake    Referring Provider Name: Patti Suárez MD  Diagnosis and/or Symptoms: Rash and nonspecific skin eruption    This kind patient declined to schedule with [facility] due scheduling further out and would like closer as she has to take [employer]. She would like to know if there are alternative locations closer to her that Dr. Suárez can recommend.    Action Taken: Message routed to:  Other: Paulo Casanova    Travel Screening: Not Applicable

## 2023-08-01 NOTE — TELEPHONE ENCOUNTER
Called patient and advised [facility] referral was placed and gave her the number to call if she does not hear from them within 2 days.   Gretel Keenan MA

## 2023-08-01 NOTE — TELEPHONE ENCOUNTER
The  should help the patient with alternative, since all the referrals in [place] are through .  If  does not help the patient, take this issue to administration

## 2023-08-04 ENCOUNTER — ANTICOAGULATION THERAPY VISIT (OUTPATIENT)
Dept: ANTICOAGULATION | Facility: CLINIC | Age: 83
End: 2023-08-04
Payer: COMMERCIAL

## 2023-08-04 ENCOUNTER — TELEPHONE (OUTPATIENT)
Dept: INTERNAL MEDICINE | Facility: CLINIC | Age: 83
End: 2023-08-04
Payer: COMMERCIAL

## 2023-08-04 DIAGNOSIS — I48.20 CHRONIC ATRIAL FIBRILLATION (H): ICD-10-CM

## 2023-08-04 DIAGNOSIS — I48.21 PERMANENT ATRIAL FIBRILLATION (H): Primary | ICD-10-CM

## 2023-08-04 NOTE — TELEPHONE ENCOUNTER
Fax received from [facility] 08/04/23 for review and signature.  Put in Dr. Lauren's in basket.

## 2023-08-04 NOTE — PROGRESS NOTES
Per home care nurse Zee, Patient refused home care visit today including INR. They have rescheduled for Monday 8/7/23.  Patient to continue same coumadin dosing until recheck on Monday.    Azul Whitaker RN  Anticoagulation Nurse - [facility] [place]

## 2023-08-07 ENCOUNTER — ANTICOAGULATION THERAPY VISIT (OUTPATIENT)
Dept: ANTICOAGULATION | Facility: CLINIC | Age: 83
End: 2023-08-07
Payer: COMMERCIAL

## 2023-08-07 ENCOUNTER — MEDICAL CORRESPONDENCE (OUTPATIENT)
Dept: HEALTH INFORMATION MANAGEMENT | Facility: CLINIC | Age: 83
End: 2023-08-07

## 2023-08-07 DIAGNOSIS — I48.20 CHRONIC ATRIAL FIBRILLATION (H): ICD-10-CM

## 2023-08-07 DIAGNOSIS — I48.21 PERMANENT ATRIAL FIBRILLATION (H): Primary | ICD-10-CM

## 2023-08-07 LAB — INR (EXTERNAL): 2.1 (ref 0.9–1.1)

## 2023-08-07 NOTE — PROGRESS NOTES
ANTICOAGULATION MANAGEMENT     Savanna Rehman 82 year old female is on warfarin with therapeutic INR result. (Goal INR 2.0-3.0)    Recent labs: (last 7 days)     08/07/23  1443   INR 2.1*       ASSESSMENT     Source(s): Chart Review and Home Care/Facility Nurse     Warfarin doses taken: Warfarin taken as instructed  Diet: No new diet changes identified  Medication/supplement changes: None noted  New illness, injury, or hospitalization: No  Signs or symptoms of bleeding or clotting: No  Previous result: Therapeutic last visit; previously outside of goal range  Additional findings: [facility] home care discharging today. [facility] will be taking over 08/15/2023. [facility] will relay warfarin dosing and INR recheck date to [facility].     PLAN     Recommended plan for no diet, medication or health factor changes affecting INR     Dosing Instructions: Continue your current warfarin dose with next INR in 8 days       Summary  As of 8/7/2023      Full warfarin instructions:  2.5 mg every Mon, Thu; 3.75 mg all other days   Next INR check:  8/15/2023               Telephone call with [facility] home care nurse who verbalizes understanding and agrees to plan    Orders given to  Homecare nurse/facility to recheck    Education provided:   Goal range and lab monitoring: goal range and significance of current result    Plan made per [facility] anticoagulation protocol    Miko West RN  Anticoagulation Clinic  8/7/2023    _______________________________________________________________________     Anticoagulation Episode Summary       Current INR goal:  2.0-3.0   TTR:  53.7 % (1 y)   Target end date:  Indefinite   Send INR reminders to:  NIKKI CHASE    Indications    Permanent atrial fibrillation (H) [I48.21]  Chronic atrial fibrillation (H) [I48.20]  Long term (current) use of anticoagulants (Resolved) [Z79.01]             Comments:  Home care              Anticoagulation Care Providers       Provider Role Specialty Phone  number    Patti Suárez MD Referring Internal Medicine [phone]

## 2023-08-08 ENCOUNTER — TELEPHONE (OUTPATIENT)
Dept: INTERNAL MEDICINE | Facility: CLINIC | Age: 83
End: 2023-08-08
Payer: COMMERCIAL

## 2023-08-09 ENCOUNTER — MEDICAL CORRESPONDENCE (OUTPATIENT)
Dept: HEALTH INFORMATION MANAGEMENT | Facility: CLINIC | Age: 83
End: 2023-08-09

## 2023-08-11 ENCOUNTER — TELEPHONE (OUTPATIENT)
Dept: INTERNAL MEDICINE | Facility: CLINIC | Age: 83
End: 2023-08-11
Payer: COMMERCIAL

## 2023-08-11 NOTE — TELEPHONE ENCOUNTER
Patient calling.  Asking for lab order to check for bacteria in her body.  States her dentist won't complete the dental work due to infection in her gums.    Patient is also c/o HAs, not eating/drinking well and not wanting to get out of bed.  She is unsure if related to her gum infection.    She has a future appointment scheduled 8/25/23 but doesn't want to wait until appointment to have labs done.    Please advise, thanks.

## 2023-08-14 ENCOUNTER — TELEPHONE (OUTPATIENT)
Dept: INTERNAL MEDICINE | Facility: CLINIC | Age: 83
End: 2023-08-14
Payer: COMMERCIAL

## 2023-08-14 NOTE — TELEPHONE ENCOUNTER
Raj RN with [facility] ([phone]) calling for start of care orders. Patient needed PT so had to sign on with another home care agency temporarily and is now wanting to return to using [facility].    Start of care order to see patient once a week for medication set up, INR monitoring and wellness checks.  hoping to see her tomorrow. BENITA Graham R.N.

## 2023-08-14 NOTE — TELEPHONE ENCOUNTER
Fax received from [facility] - PT 08/13/23 for review and signature.  Put in Dr. Lauren's in basket.

## 2023-08-15 ENCOUNTER — ANTICOAGULATION THERAPY VISIT (OUTPATIENT)
Dept: ANTICOAGULATION | Facility: CLINIC | Age: 83
End: 2023-08-15

## 2023-08-15 ENCOUNTER — LAB (OUTPATIENT)
Dept: LAB | Facility: CLINIC | Age: 83
End: 2023-08-15
Payer: COMMERCIAL

## 2023-08-15 ENCOUNTER — MEDICAL CORRESPONDENCE (OUTPATIENT)
Dept: HEALTH INFORMATION MANAGEMENT | Facility: CLINIC | Age: 83
End: 2023-08-15

## 2023-08-15 ENCOUNTER — TELEPHONE (OUTPATIENT)
Dept: INTERNAL MEDICINE | Facility: CLINIC | Age: 83
End: 2023-08-15

## 2023-08-15 DIAGNOSIS — I48.20 CHRONIC ATRIAL FIBRILLATION (H): ICD-10-CM

## 2023-08-15 DIAGNOSIS — I48.21 PERMANENT ATRIAL FIBRILLATION (H): Primary | ICD-10-CM

## 2023-08-15 DIAGNOSIS — K74.60 CIRRHOSIS OF LIVER WITHOUT ASCITES (H): ICD-10-CM

## 2023-08-15 DIAGNOSIS — R19.5 LOOSE STOOLS: ICD-10-CM

## 2023-08-15 LAB — INR (EXTERNAL): 3.8 (ref 0.9–1.1)

## 2023-08-15 PROCEDURE — 86038 ANTINUCLEAR ANTIBODIES: CPT

## 2023-08-15 PROCEDURE — 82390 ASSAY OF CERULOPLASMIN: CPT

## 2023-08-15 PROCEDURE — 36415 COLL VENOUS BLD VENIPUNCTURE: CPT

## 2023-08-15 PROCEDURE — 99000 SPECIMEN HANDLING OFFICE-LAB: CPT

## 2023-08-15 PROCEDURE — 83516 IMMUNOASSAY NONANTIBODY: CPT | Mod: 90

## 2023-08-15 PROCEDURE — 86039 ANTINUCLEAR ANTIBODIES (ANA): CPT

## 2023-08-15 PROCEDURE — 86381 MITOCHONDRIAL ANTIBODY EACH: CPT

## 2023-08-15 NOTE — TELEPHONE ENCOUNTER
Pt calling to get lab only appt scheduled.    Scheduled pt for lab only appt today at [facility] location.    Juliane SHIPMAN RN

## 2023-08-15 NOTE — PROGRESS NOTES
ANTICOAGULATION MANAGEMENT     Savanna Rehman 82 year old female is on warfarin with supratherapeutic INR result. (Goal INR 2.0-3.0)    Recent labs: (last 7 days)     08/15/23  0000   INR 3.8*       ASSESSMENT     Source(s): Chart Review and Home Care/Facility Nurse Cole Anthony  [phone]    Warfarin doses taken: Warfarin taken as instructed  Diet: No new diet changes identified  Medication/supplement changes: None noted  New illness, injury, or hospitalization: No  Signs or symptoms of bleeding or clotting: Yes: some bruising   Previous result: Therapeutic last 2(+) visits  Additional findings: None       PLAN     Recommended plan for no diet, medication or health factor changes affecting INR     Dosing Instructions: partial hold then continue your current warfarin dose with next INR in 1 week       Summary  As of 8/15/2023      Full warfarin instructions:  8/15: 2.5 mg; Otherwise 2.5 mg every Mon, Thu; 3.75 mg all other days   Next INR check:  8/22/2023               Telephone call with [facility] care nurse who verbalizes understanding and agrees to plan    Patient to recheck with home meter    Education provided:   Please call back if any changes to your diet, medications or how you've been taking warfarin  Goal range and lab monitoring: goal range and significance of current result, Importance of therapeutic range, and Importance of following up at instructed interval  Symptom monitoring: monitoring for bleeding signs and symptoms    Plan made per [facility] anticoagulation protocol    Dottie Villalta, RN  Anticoagulation Clinic  8/15/2023    _______________________________________________________________________     Anticoagulation Episode Summary       Current INR goal:  2.0-3.0   TTR:  54.4 % (1 y)   Target end date:  Indefinite   Send INR reminders to:  NIKKI CHASE    Indications    Permanent atrial fibrillation (H) [I48.21]  Chronic atrial fibrillation (H) [I48.20]  Long term (current) use  of anticoagulants (Resolved) [Z79.01]             Comments:  Home care              Anticoagulation Care Providers       Provider Role Specialty Phone number    Patti Suárez MD Referring Internal Medicine [phone]

## 2023-08-16 ENCOUNTER — TELEPHONE (OUTPATIENT)
Dept: INTERNAL MEDICINE | Facility: CLINIC | Age: 83
End: 2023-08-16
Payer: COMMERCIAL

## 2023-08-16 NOTE — TELEPHONE ENCOUNTER
Called and relayed provider message to patient. Patient states that she doesn't want to go to her dentist because she doesn't trust the dentist.  Patient states she has had no taste or smell since July 25th. Patient states she is requesting lab orders to check for white blood cell levels.     Routing to provider to advise on patient request for lab draw to check WBC count.    Thank you,  Dawood Ortez, Triage RN [place]  8:29 AM 8/16/2023

## 2023-08-16 NOTE — TELEPHONE ENCOUNTER
I am sorry, I do not know how to test infection in the gums  If the gums have infection, there is a specialist for this, called Endodontic specialist, and the dentist is the one to make the referral to Endodontic specialist,

## 2023-08-17 ENCOUNTER — TELEPHONE (OUTPATIENT)
Dept: INTERNAL MEDICINE | Facility: CLINIC | Age: 83
End: 2023-08-17
Payer: COMMERCIAL

## 2023-08-17 LAB
ANA PAT SER IF-IMP: ABNORMAL
ANA SER QL IF: POSITIVE
ANA TITR SER IF: ABNORMAL {TITER}
CERULOPLASMIN SERPL-MCNC: 33 MG/DL (ref 20–60)
SMA IGG SER-ACNC: 9 UNITS

## 2023-08-17 NOTE — TELEPHONE ENCOUNTER
Fax received from [facility] - PT 08/16/23 for review and signature.  Put in Dr. Lauren's in basket.

## 2023-08-18 ENCOUNTER — MEDICAL CORRESPONDENCE (OUTPATIENT)
Dept: HEALTH INFORMATION MANAGEMENT | Facility: CLINIC | Age: 83
End: 2023-08-18

## 2023-08-18 LAB — MITOCHONDRIA M2 IGG SER-ACNC: 1.1 U/ML

## 2023-08-21 ENCOUNTER — TRANSFERRED RECORDS (OUTPATIENT)
Dept: HEALTH INFORMATION MANAGEMENT | Facility: CLINIC | Age: 83
End: 2023-08-21
Payer: COMMERCIAL

## 2023-08-22 ENCOUNTER — TELEPHONE (OUTPATIENT)
Dept: INTERNAL MEDICINE | Facility: CLINIC | Age: 83
End: 2023-08-22
Payer: COMMERCIAL

## 2023-08-22 ENCOUNTER — ANTICOAGULATION THERAPY VISIT (OUTPATIENT)
Dept: ANTICOAGULATION | Facility: CLINIC | Age: 83
End: 2023-08-22
Payer: COMMERCIAL

## 2023-08-22 DIAGNOSIS — I48.21 PERMANENT ATRIAL FIBRILLATION (H): Primary | ICD-10-CM

## 2023-08-22 DIAGNOSIS — I48.20 CHRONIC ATRIAL FIBRILLATION (H): ICD-10-CM

## 2023-08-22 LAB — INR (EXTERNAL): 2.4 (ref 0.9–1.1)

## 2023-08-23 ENCOUNTER — MEDICAL CORRESPONDENCE (OUTPATIENT)
Dept: HEALTH INFORMATION MANAGEMENT | Facility: CLINIC | Age: 83
End: 2023-08-23

## 2023-08-23 ENCOUNTER — TELEPHONE (OUTPATIENT)
Dept: INTERNAL MEDICINE | Facility: CLINIC | Age: 83
End: 2023-08-23
Payer: COMMERCIAL

## 2023-08-23 NOTE — TELEPHONE ENCOUNTER
Reason for call:  Other   Patient called regarding (reason for call): appointment  Additional comments: Patient had an in person appt scheduled for 8/25 at 10:10 with Dr. Suárez.  Patient is dealing with some chronic diahrea right now and wants to have a telephone visit instead.  Patient can be reached at [phone] for TE Visit    Phone number to reach patient:  Cell number on file:    Telephone Information:   Mobile [phone]       Best Time:  Anytime    Can we leave a detailed message on this number?  YES    Travel screening: Not Applicable

## 2023-08-23 NOTE — TELEPHONE ENCOUNTER
Called and spoke with patient to relayed provider message to patient. She states she will talk about this more with provider during upcoming appointment.     Thank you,  Dawood Ortez, Triage RN [facility]  9:46 AM 8/23/2023

## 2023-08-25 ENCOUNTER — VIRTUAL VISIT (OUTPATIENT)
Dept: INTERNAL MEDICINE | Facility: CLINIC | Age: 83
End: 2023-08-25
Payer: COMMERCIAL

## 2023-08-25 ENCOUNTER — MEDICAL CORRESPONDENCE (OUTPATIENT)
Dept: HEALTH INFORMATION MANAGEMENT | Facility: CLINIC | Age: 83
End: 2023-08-25

## 2023-08-25 DIAGNOSIS — Z53.9 DIAGNOSIS NOT YET DEFINED: Primary | ICD-10-CM

## 2023-08-25 DIAGNOSIS — R53.81 PHYSICAL DECONDITIONING: Primary | ICD-10-CM

## 2023-08-25 DIAGNOSIS — R19.7 ACUTE DIARRHEA: ICD-10-CM

## 2023-08-25 DIAGNOSIS — K75.81 LIVER CIRRHOSIS SECONDARY TO NASH (H): ICD-10-CM

## 2023-08-25 DIAGNOSIS — K74.60 LIVER CIRRHOSIS SECONDARY TO NASH (H): ICD-10-CM

## 2023-08-25 PROCEDURE — 99443 PR PHYSICIAN TELEPHONE EVALUATION 21-30 MIN: CPT | Mod: 95 | Performed by: INTERNAL MEDICINE

## 2023-08-25 PROCEDURE — G0179 MD RECERTIFICATION HHA PT: HCPCS | Performed by: INTERNAL MEDICINE

## 2023-08-25 NOTE — PROGRESS NOTES
"Savanna is a 82 year old who is being evaluated via a billable video visit.      How would you like to obtain your AVS? Mail a copy  If the video visit is dropped, the invitation should be resent by: Text to cell phone: [phone]  Will anyone else be joining your video visit? No          This is a telephone encounter with the patient.       10:24 --- 10:55          Dr Lauren's note      Patient's instructions / PLAN:                                                        Plan:  Hold Mylanta   2. If the diarrhea persists - do the stool test   3. Continue the other meds, same doses for now.      ASSESSMENT & PLAN:                                                      (R53.81) Physical deconditioning  (primary encounter diagnosis)  Comment:   Plan: Physical Therapy Referral      (R19.7) Acute diarrhea  Comment:       Possible sec high dose Mylanta but hx of C Diff   Plan: C. difficile Toxin B PCR with reflex to C.         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 Not a drinker.   -- takes Mylanta 2 tabs bid. She says that the tabs do not contain Magnesium   -- Hx of C.Diff        Cirrhosis  -- neg work up     PT  -- she wants a referral for deconditioning  -- she wants rx for lift chair.  -- I think she qualifies based on what she tells me but she needs F2F    Review of Systems:                                                      ROS: negative for fever, chills, cough, wheezes, chest pain, shortness of breath, vomiting, abdominal pain, leg swelling       OBJECTIVE:           An actual physical exam " can't be done during phone visit   A limited exam can sometimes be performed by video visit   NAD      PMHx: reviewed  Past Medical History:   Diagnosis Date    Anemia     Iron Deficiency anemia    Atrial fibrillation (H)     CAD (coronary artery disease)     non-obstructive    Chronic pain     neck, low back, legs    Congestive heart failure (H)     Degenerative disk disease     Diabetes (H)     Fibromyalgia     Gastro-oesophageal reflux disease     Gout     Hiatal hernia     Mumps     Neuropathy     CRISTIANA (obstructive sleep apnea) - CPAP     Palpitations     Pernicious anemia     Sleep apnea     uses CPAP.    Urinary incontinence     Vitamin D deficiency       PSHx: reviewed  Past Surgical History:   Procedure Laterality Date    APPENDECTOMY      CHOLECYSTECTOMY      COLONOSCOPY  3/15/2011    COLONOSCOPY N/A 3/15/2023    Procedure: COLONOSCOPY, WITH POLYPECTOMY AND BIOPSY;  Surgeon: Maci Coronel MD;  Location:  GI    COLONOSCOPY N/A 3/15/2023    Procedure: COLONOSCOPY, FLEXIBLE, WITH LESION REMOVAL USING SNARE;  Surgeon: Maci Coronel MD;  Location:  GI    CORONARY ANGIOGRAPHY ADULT ORDER      CYSTOSCOPY, BIOPSY BLADDER, COMBINED N/A 7/13/2020    Procedure: CYSTOSCOPY, WITH BLADDER BIOPSY;  Surgeon: Deisy Wilson MD;  Location:  OR    HEART CATH LEFT HEART CATH  12/30/16    medication management    HYSTERECTOMY TOTAL ABDOMINAL      Knee replacement NOS Left     LAPAROSCOPIC NISSEN FUNDOPLICATION N/A 2/4/2015    Procedure: LAPAROSCOPIC NISSEN FUNDOPLICATION;  Surgeon: Armando Ansari MD;  Location:  OR    TONSILLECTOMY      TRANSPOSITION ULNAR NERVE (ELBOW)          Meds: reviewed  Current Outpatient Medications   Medication Sig Dispense Refill    acetaminophen (TYLENOL) 325 MG tablet Take 3 tablets (975 mg) by mouth every 8 hours as needed for mild pain      blood glucose (NO BRAND SPECIFIED) lancets standard Use to test blood sugar  as directed. 1 each 0    blood glucose (NO BRAND  SPECIFIED) lancing device Device to be used with lancets. Patient requests Kid Bunchet 2 lancing device to check blood glucose once per day. 1 each 0    blood glucose (ONETOUCH ULTRA) test strip Use to test blood sugar 1 times daily 100 strip 1    blood glucose calibration (NO BRAND SPECIFIED) solution Use to calibrate blood glucose monitor 1 Bottle 3    blood glucose monitoring (NO BRAND SPECIFIED) meter device kit Use to test blood sugar 1 time daily 1 kit 0    butalbital-aspirin-caffeine (FIORINAL) -40 MG capsule TAKE 1 CAPSULE BY MOUTH EVERY 6 HOURS AS NEEDED FOR HEADACHE 15 capsule 0    Cholecalciferol (VITAMIN D-3) 125 MCG (5000 UT) TABS Take 5,000 Units by mouth daily      EPINEPHrine (ANY BX GENERIC EQUIV) 0.3 MG/0.3ML injection 2-pack INJECT 0.3MLS (0.3MG) INTO THE MUSCLE ONCE AS NEEDED FOR ANAPHYLAXIS 2 each 1    famotidine (PEPCID) 20 MG tablet Take 20 mg by mouth daily      glipiZIDE (GLUCOTROL XL) 2.5 MG 24 hr tablet Take 1 tablet (2.5 mg) by mouth 2 times daily 180 tablet 3    indapamide (LOZOL) 1.25 MG tablet Take 1 tablet (1.25 mg) by mouth every morning 30 tablet 3    loperamide (IMODIUM A-D) 2 MG tablet Take 2 mg by mouth 4 times daily as needed for diarrhea      meclizine (ANTIVERT) 12.5 MG tablet TAKE 1 TABLET BY MOUTH THREE TIMES DAILY AS NEEDED FOR DIZZINESS 30 tablet 0    METAMUCIL FIBER PO Take 2 tablets by mouth daily      mupirocin (BACTROBAN) 2 % external ointment Apply topically 3 times daily 15 g 0    polyethylene glycol (MIRALAX) 17 g packet Take 17 g by mouth daily as needed for constipation      senna-docusate (SENOKOT-S/PERICOLACE) 8.6-50 MG tablet Take 1 tablet by mouth daily as needed for constipation      sertraline (ZOLOFT) 100 MG tablet Take 1 tablet (100 mg) by mouth daily 90 tablet 1    Vitamin D3 (CHOLECALCIFEROL) 125 MCG (5000 UT) tablet Take 125 mcg by mouth daily      warfarin ANTICOAGULANT (COUMADIN) 2.5 MG tablet Take 1.5 tablet (3.75 mg) by mouth daily except  take 1 tablet (2.5 mg) Mon, Th or as directed by INR clinic 125 tablet 1       Soc Hx: reviewed  Fam Hx: reviewed      Chart documentation was completed, in part, with [employer] voice-recognition software. Even though reviewed, some grammatical, spelling, and word errors may remain.    Patti Lauren MD  Internal Medicine

## 2023-08-25 NOTE — PATIENT INSTRUCTIONS
Plan:  Hold Mylanta   2. If the diarrhea persists - do the stool test   3. Continue the other meds, same doses for now.

## 2023-08-26 ENCOUNTER — NURSE TRIAGE (OUTPATIENT)
Dept: NURSING | Facility: CLINIC | Age: 83
End: 2023-08-26
Payer: COMMERCIAL

## 2023-08-26 NOTE — TELEPHONE ENCOUNTER
"Pt reports she takes Zoloft and a friend of hers told her that she took Zoloft and got tremors and diarrhea and pt thinks she has the same side effect. Pt reports she was diagnosed with c-diff two years ago and has chronic diarrhea ever since. GI provider advised her to take metamucil. Pt reports she developed tremors \"about six weeks ago\".  Pt reports tremors have worsened and \"walker shakes so bad I am afraid I will knock over walker\".     Writer advised pt to hang up and dial 911 now per protocol.     Pt refuses to call 911, states last time she waited for hours in the ER and does not want to go. Pt will contact prescribing provider on Monday.       Reason for Disposition   [1] SEVERE weakness (i.e., unable to walk or barely able to walk, requires support) AND [2] new-onset or worsening    Protocols used: Neurologic Deficit-A-AH    "

## 2023-08-28 ENCOUNTER — TELEPHONE (OUTPATIENT)
Dept: INTERNAL MEDICINE | Facility: CLINIC | Age: 83
End: 2023-08-28
Payer: COMMERCIAL

## 2023-08-28 ENCOUNTER — NURSE TRIAGE (OUTPATIENT)
Dept: INTERNAL MEDICINE | Facility: CLINIC | Age: 83
End: 2023-08-28
Payer: COMMERCIAL

## 2023-08-28 DIAGNOSIS — F32.A ANXIETY AND DEPRESSION: ICD-10-CM

## 2023-08-28 DIAGNOSIS — F41.9 ANXIETY AND DEPRESSION: ICD-10-CM

## 2023-08-28 NOTE — TELEPHONE ENCOUNTER
Patient wants to decrease from sertraline (ZOLOFT) 100 MG tablet due to upset stomach and loose stools. Please advise. Ok to call and lm [phone]

## 2023-08-28 NOTE — TELEPHONE ENCOUNTER
Patient calling and would like to be put on low dose of Cymbalta which has worked for her years ago. Please advise

## 2023-08-28 NOTE — TELEPHONE ENCOUNTER
S-(situation): Patient stated she started Zoloft some time ago, and stated thinks the symptoms she has been experiencing for the past month are side effects from taking the medication.  She stated she would vidya to wean off of the Zoloft.  Patient verbalized diarrhea, weakness, dizziness and tremors.  She stated she had spoken with a friend who had the same side effects with Zoloft.  Patient verbalized that she understands that she needs to have her PCP direction on discontinuing the medication.  Patient is requesting message to be sent high priority to PCP.    B-(background): Patient stated she started taking Zoloft (medication record indicated prescription 6/12/2023) and approximately 1 month ago had started symptoms increasing diarrhea, dizziness and tremors    A-(assessment): Possible side effects from Zoloft.    R-(recommendations): To be seen today in office per protocol.  Emergency/ urgent care for worsening symptoms.  Patient stated her friend and her daughter are nurses who have been helping her with side effects and assessing severity of symptoms.    Will forward to PCP for review.    Reason for Disposition   Patient wants to be seen    Additional Information   Negative: SEVERE difficulty breathing (e.g., struggling for each breath, speaks in single words)   Negative: Shock suspected (e.g., cold/pale/clammy skin, too weak to stand, low BP, rapid pulse)   Negative: Difficult to awaken or acting confused (e.g., disoriented, slurred speech)   Negative: Fainted, and still feels dizzy afterwards   Negative: Overdose (accidental or intentional) of medications   Negative: New neurologic deficit that is present now: * Weakness of the face, arm, or leg on one side of the body * Numbness of the face, arm, or leg on one side of the body * Loss of speech or garbled speech   Negative: Heart beating < 50 beats per minute OR > 140 beats per minute   Negative: Sounds like a life-threatening emergency to the triager    Negative: Chest pain   Negative: Rectal bleeding, bloody stool, or tarry-black stool   Negative: Vomiting is main symptom   Negative: Diarrhea is main symptom   Negative: Headache is main symptom   Negative: Heat exhaustion suspected (i.e., dehydration from heat exposure)   Negative: Patient states that they are having an anxiety or panic attack   Negative: Dizziness from low blood sugar (i.e., < 60 mg/dl or 3.5 mmol/l)   Negative: SEVERE dizziness (e.g., unable to stand, requires support to walk, feels like passing out now)   Negative: SEVERE headache or neck pain   Negative: Spinning or tilting sensation (vertigo) present now and one or more stroke risk factors (i.e., hypertension, diabetes mellitus, prior stroke/TIA, heart attack, age over 60) (Exception: prior physician evaluation for this AND no different/worse than usual)   Negative: Neurologic deficit that was brief (now gone), ANY of the following:* Weakness of the face, arm, or leg on one side of the body* Numbness of the face, arm, or leg on one side of the body* Loss of speech or garbled speech   Negative: Loss of vision or double vision  (Exception: Similar to previous migraines.)   Negative: Extra heart beats OR irregular heart beating (i.e., 'palpitations')   Negative: Difficulty breathing   Negative: Drinking very little and has signs of dehydration (e.g., no urine > 12 hours, very dry mouth, very lightheaded)   Negative: Follows bleeding (e.g., stomach, rectum, vagina)  (Exception: Became dizzy from sight of small amount blood.)   Negative: Patient sounds very sick or weak to the triager   Negative: Lightheadedness (dizziness) present now, after 2 hours of rest and fluids   Negative: Spinning or tilting sensation (vertigo) present now   Negative: Fever > 103 F (39.4 C)   Negative: Fever > 100.0 F (37.8 C) and has diabetes mellitus or a weak immune system (e.g., HIV positive, cancer chemotherapy, organ transplant, splenectomy, chronic  "steroids)    Answer Assessment - Initial Assessment Questions  1. DESCRIPTION: \"Describe your dizziness.\"      Started Zoloft and after some time started symptoms of dizziness approximately 1 month ago    2. LIGHTHEADED: \"Do you feel lightheaded?\" (e.g., somewhat faint, woozy, weak upon standing)      Lightheaded    3. VERTIGO: \"Do you feel like either you or the room is spinning or tilting?\" (i.e. vertigo)      No    4. SEVERITY: \"How bad is it?\"  \"Do you feel like you are going to faint?\" \"Can you stand and walk?\"    - MILD: Feels slightly dizzy, but walking normally.    - MODERATE: Feels unsteady when walking, but not falling; interferes with normal activities (e.g., school, work).    - SEVERE: Unable to walk without falling, or requires assistance to walk without falling; feels like passing out now.       Mild    5. ONSET:  \"When did the dizziness begin?\"      Approximately 1 month ago    6. AGGRAVATING FACTORS: \"Does anything make it worse?\" (e.g., standing, change in head position)      NA    7. HEART RATE: \"Can you tell me your heart rate?\" \"How many beats in 15 seconds?\"  (Note: not all patients can do this)        NA    8. CAUSE: \"What do you think is causing the dizziness?\"      Zoloft    9. RECURRENT SYMPTOM: \"Have you had dizziness before?\" If Yes, ask: \"When was the last time?\" \"What happened that time?\"      No    10. OTHER SYMPTOMS: \"Do you have any other symptoms?\" (e.g., fever, chest pain, vomiting, diarrhea, bleeding)        Diarrhea, weakness, tremors    11. PREGNANCY: \"Is there any chance you are pregnant?\" \"When was your last menstrual period?\"        NA    Protocols used: Dizziness-A-OH  Hilary Eller RN    "

## 2023-08-29 ENCOUNTER — DOCUMENTATION ONLY (OUTPATIENT)
Dept: ANTICOAGULATION | Facility: CLINIC | Age: 83
End: 2023-08-29
Payer: COMMERCIAL

## 2023-08-29 ENCOUNTER — TELEPHONE (OUTPATIENT)
Dept: INTERNAL MEDICINE | Facility: CLINIC | Age: 83
End: 2023-08-29
Payer: COMMERCIAL

## 2023-08-29 ENCOUNTER — ANTICOAGULATION THERAPY VISIT (OUTPATIENT)
Dept: ANTICOAGULATION | Facility: CLINIC | Age: 83
End: 2023-08-29
Payer: COMMERCIAL

## 2023-08-29 DIAGNOSIS — R19.7 DIARRHEA, UNSPECIFIED: Primary | ICD-10-CM

## 2023-08-29 DIAGNOSIS — I48.21 PERMANENT ATRIAL FIBRILLATION (H): Primary | ICD-10-CM

## 2023-08-29 DIAGNOSIS — I48.20 CHRONIC ATRIAL FIBRILLATION (H): ICD-10-CM

## 2023-08-29 DIAGNOSIS — I48.20 CHRONIC ATRIAL FIBRILLATION (H): Primary | ICD-10-CM

## 2023-08-29 LAB — INR (EXTERNAL): 2.9 (ref 0.9–1.1)

## 2023-08-29 RX ORDER — DULOXETIN HYDROCHLORIDE 20 MG/1
20 CAPSULE, DELAYED RELEASE ORAL DAILY
Qty: 30 CAPSULE | Refills: 1 | Status: SHIPPED | OUTPATIENT
Start: 2023-08-29 | End: 2023-10-17

## 2023-08-29 NOTE — PROGRESS NOTES
ANTICOAGULATION CLINIC REFERRAL RENEWAL REQUEST       An annual renewal order is required for all patients referred to [facility].?  Please review and sign the pended referral order for Savanna Rehman.       ANTICOAGULATION SUMMARY      Warfarin indication(s)   Atrial Fibrillation    Mechanical heart valve present?  NO       Current goal range   INR: 2.0-3.0     Goal appropriate for indication? Goal INR 2-3, standard for indication(s) above     Time in Therapeutic Range (TTR)  (Goal > 60%) 53.3%       Office visit with referring provider's group within last year yes on 7/25/23       Dorothy Cai RN  [facility]

## 2023-08-29 NOTE — TELEPHONE ENCOUNTER
Called and spoke to patient to relay provider's message and review provider recommendations to start Cymbalta AFTER off the sertraline. Patient agrees to plan.    Thank you,  Dawood Ortez, Triage RN [facility]  11:10 AM 8/29/2023

## 2023-08-29 NOTE — TELEPHONE ENCOUNTER
Pt calling back and would like to start Cymbalta, how many mg, and when to start, if OK?.   Please advise.     She has someone coming over at Noon today, if they need to go to pharmacy.

## 2023-08-29 NOTE — PROGRESS NOTES
ANTICOAGULATION MANAGEMENT     Savanna Rehman 82 year old female is on warfarin with therapeutic INR result. (Goal INR 2.0-3.0)    Recent labs: (last 7 days)     08/29/23  1200   INR 2.9*       ASSESSMENT     Source(s): Chart Review and Home Care/Facility Nurse     Warfarin doses taken: Warfarin taken as instructed  Diet: No new diet changes identified  Medication/supplement changes: Zoloft dose increased on 8/28 from 100 mg to 50 mg for 7 days then stop plan is to switch her to cymbalta  New illness, injury, or hospitalization: No  Signs or symptoms of bleeding or clotting: No  Previous result: Therapeutic last visit; previously outside of goal range  Additional findings: None       PLAN     Recommended plan for ongoing change(s) affecting INR     Dosing Instructions: Continue your current warfarin dose with next INR in 1 week       Summary  As of 8/29/2023      Full warfarin instructions:  2.5 mg every Mon, Thu; 3.75 mg all other days   Next INR check:  9/5/2023               Telephone call with [facility] home care nurse who verbalizes understanding and agrees to plan    Orders given to  Homecare nurse/facility to recheck    Education provided:   Interaction IS anticipated between warfarin and Zoloft/ cymbalta    Plan made per [facility] anticoagulation protocol    Dorothy Cai RN  Anticoagulation Clinic  8/29/2023    _______________________________________________________________________     Anticoagulation Episode Summary       Current INR goal:  2.0-3.0   TTR:  53.3 % (1 y)   Target end date:  Indefinite   Send INR reminders to:  NIKKI CHASE    Indications    Permanent atrial fibrillation (H) [I48.21]  Chronic atrial fibrillation (H) [I48.20]  Long term (current) use of anticoagulants (Resolved) [Z79.01]             Comments:  Home care              Anticoagulation Care Providers       Provider Role Specialty Phone number    Patti Suárez MD Referring Internal Medicine [phone]

## 2023-08-29 NOTE — TELEPHONE ENCOUNTER
Patient calls.   She states that she needs face-to-face appointment with Dr. Lauren for a lift chair. Patient states she was told by Dr. Lauren to call [facility] to get scheduled for a long appointment for the face-to-face visit. Routed to provider to confirm if longer visit is needed for patient to have face-to-face appointment.     Mahsa Arteaga RN  [facility]

## 2023-08-30 NOTE — TELEPHONE ENCOUNTER
Called and spoke with patient to help assist her with appointment. No available appointments until January 2nd. Is patient able to be scheduled in sooner? Please advise if patient needs 40 minute appointment or how long appointment would be and where patient could possibly be worked in.    Thank you,  Dawood Ortez, Triage RN [facility]  10:45 AM 8/30/2023

## 2023-09-01 NOTE — TELEPHONE ENCOUNTER
30 MIN - REGULAR MELANI     There are few same-day spots at the end of September.  You may use one of them or appointment with different provider

## 2023-09-01 NOTE — TELEPHONE ENCOUNTER
Called and spoke with patient to help her get appointment set up.    Appointments in Next Year      Sep 21, 2023  9:00 AM  (Arrive by 8:40 AM)  Provider Visit with Patti Suárez MD  [facility] ([facility] [place] ) [phone]            Thank you,  Dawood Ortez, Triage RN [facility]  1:20 PM 9/1/2023

## 2023-09-05 ENCOUNTER — ANTICOAGULATION THERAPY VISIT (OUTPATIENT)
Dept: ANTICOAGULATION | Facility: CLINIC | Age: 83
End: 2023-09-05
Payer: COMMERCIAL

## 2023-09-05 DIAGNOSIS — I48.21 PERMANENT ATRIAL FIBRILLATION (H): Primary | ICD-10-CM

## 2023-09-05 DIAGNOSIS — I48.20 CHRONIC ATRIAL FIBRILLATION (H): ICD-10-CM

## 2023-09-05 NOTE — PROGRESS NOTES
ANTICOAGULATION MANAGEMENT     Savanna Rehman 82 year old female is on warfarin with NO INR result. (Goal INR 2.0-3.0).  Patient told home care not to come today because she will not be home, her brother-in-law is sick and she and here  are going to see him and to come next week.    Recent labs: (last 7 days)     08/29/23  1200   INR 2.9*       ASSESSMENT     Source(s): Chart Review and Home Care/Facility Nurse     Warfarin doses taken: Warfarin taken as instructed  Diet: No new diet changes identified  Medication/supplement changes: None noted  New illness, injury, or hospitalization: No  Signs or symptoms of bleeding or clotting: No  Previous result: Therapeutic last visit; previously outside of goal range  Additional findings: None       PLAN     Recommended plan for no diet, medication or health factor changes affecting INR     Dosing Instructions: Continue your current warfarin dose with next INR in 1 week       Summary  As of 9/5/2023      Full warfarin instructions:  2.5 mg every Mon, Thu; 3.75 mg all other days   Next INR check:  9/12/2023               Telephone call with [place] home care nurse who verbalizes understanding and agrees to plan    Orders given to  Homecare nurse/facility to recheck    Education provided:   Please call back if any changes to your diet, medications or how you've been taking warfarin    Plan made per [facility] anticoagulation protocol    Roya Sky RN  Anticoagulation Clinic  9/5/2023    _______________________________________________________________________     Anticoagulation Episode Summary       Current INR goal:  2.0-3.0   TTR:  52.4 % (11.9 mo)   Target end date:  Indefinite   Send INR reminders to:  [facility]    Indications    Permanent atrial fibrillation (H) [I48.21]  Chronic atrial fibrillation (H) [I48.20]  Long term (current) use of anticoagulants (Resolved) [Z79.01]             Comments:  Home care              Anticoagulation Care Providers        Provider Role Specialty Phone number    Patti Suárez MD Referring Internal Medicine [phone]

## 2023-09-08 ENCOUNTER — TELEPHONE (OUTPATIENT)
Dept: INTERNAL MEDICINE | Facility: CLINIC | Age: 83
End: 2023-09-08
Payer: COMMERCIAL

## 2023-09-11 ENCOUNTER — TELEPHONE (OUTPATIENT)
Dept: INTERNAL MEDICINE | Facility: CLINIC | Age: 83
End: 2023-09-11
Payer: COMMERCIAL

## 2023-09-11 NOTE — TELEPHONE ENCOUNTER
Skilled nursing missed visit report during the week of 9/3/23 through 9/9/23 recieved via fax from [facility] [email or web address]. Form in your mailbox for signature. Please advise.

## 2023-09-12 ENCOUNTER — ANTICOAGULATION THERAPY VISIT (OUTPATIENT)
Dept: ANTICOAGULATION | Facility: CLINIC | Age: 83
End: 2023-09-12
Payer: COMMERCIAL

## 2023-09-12 ENCOUNTER — MEDICAL CORRESPONDENCE (OUTPATIENT)
Dept: HEALTH INFORMATION MANAGEMENT | Facility: CLINIC | Age: 83
End: 2023-09-12

## 2023-09-12 DIAGNOSIS — I48.20 CHRONIC ATRIAL FIBRILLATION (H): ICD-10-CM

## 2023-09-12 DIAGNOSIS — I48.21 PERMANENT ATRIAL FIBRILLATION (H): Primary | ICD-10-CM

## 2023-09-12 NOTE — PROGRESS NOTES
Rosa calling [facility] to inform [facility] she was unable to get the patient's INR today due to  test strips.  Home care will get new test strips and check the patient by the end of the week.    Advised to continue with current maintenance dose.  Home care stated understanding and was agreeable with plan.    JAMI JohnsonN, RN  Anticoagulation Clinic

## 2023-09-15 ENCOUNTER — ANTICOAGULATION THERAPY VISIT (OUTPATIENT)
Dept: ANTICOAGULATION | Facility: CLINIC | Age: 83
End: 2023-09-15
Payer: COMMERCIAL

## 2023-09-15 ENCOUNTER — TELEPHONE (OUTPATIENT)
Dept: INTERNAL MEDICINE | Facility: CLINIC | Age: 83
End: 2023-09-15
Payer: COMMERCIAL

## 2023-09-15 DIAGNOSIS — I48.20 CHRONIC ATRIAL FIBRILLATION (H): ICD-10-CM

## 2023-09-15 DIAGNOSIS — I48.21 PERMANENT ATRIAL FIBRILLATION (H): Primary | ICD-10-CM

## 2023-09-15 LAB — INR (EXTERNAL): 2 (ref 0.9–1.1)

## 2023-09-15 NOTE — PROGRESS NOTES
ANTICOAGULATION MANAGEMENT     Savanna Rehman 82 year old female is on warfarin with therapeutic INR result. (Goal INR 2.0-3.0)    Recent labs: (last 7 days)     09/15/23  0940   INR 2.0*       ASSESSMENT     Source(s): Chart Review, Patient/Caregiver Call, and Home Care/Facility Nurse     Warfarin doses taken: Warfarin taken as instructed  Diet: No new diet changes identified  Medication/supplement changes:  stopped sertraline and started cymbalta this week (both can increase INR)   New illness, injury, or hospitalization: No  Signs or symptoms of bleeding or clotting: No  Previous result: Therapeutic last 2(+) visits  Additional findings: None       PLAN     Recommended plan for ongoing change(s) affecting INR     Dosing Instructions: Continue your current warfarin dose with next INR in 11 days       Summary  As of 9/15/2023      Full warfarin instructions:  2.5 mg every Mon, Thu; 3.75 mg all other days   Next INR check:  9/26/2023               Telephone call with [facility] home care nurse who verbalizes understanding and agrees to plan and who agrees to plan and repeated back plan correctly    Orders given to  Homecare nurse/facility to recheck    Education provided:   Contact [phone]  with any changes, questions or concerns.     Plan made per [facility] anticoagulation protocol    Sanjana Fox RN  Anticoagulation Clinic  9/15/2023    _______________________________________________________________________     Anticoagulation Episode Summary       Current INR goal:  2.0-3.0   TTR:  53.3 % (1 y)   Target end date:  Indefinite   Send INR reminders to:  [facility]    Indications    Permanent atrial fibrillation (H) [I48.21]  Chronic atrial fibrillation (H) [I48.20]  Long term (current) use of anticoagulants (Resolved) [Z79.01]             Comments:  Home care              Anticoagulation Care Providers       Provider Role Specialty Phone number    Patti Suárez MD Referring Internal Medicine  [phone]

## 2023-09-19 ENCOUNTER — MEDICAL CORRESPONDENCE (OUTPATIENT)
Dept: HEALTH INFORMATION MANAGEMENT | Facility: CLINIC | Age: 83
End: 2023-09-19

## 2023-09-21 ENCOUNTER — APPOINTMENT (OUTPATIENT)
Dept: CT IMAGING | Facility: CLINIC | Age: 83
End: 2023-09-21
Attending: EMERGENCY MEDICINE
Payer: COMMERCIAL

## 2023-09-21 ENCOUNTER — OFFICE VISIT (OUTPATIENT)
Dept: INTERNAL MEDICINE | Facility: CLINIC | Age: 83
End: 2023-09-21
Payer: COMMERCIAL

## 2023-09-21 ENCOUNTER — APPOINTMENT (OUTPATIENT)
Dept: MRI IMAGING | Facility: CLINIC | Age: 83
End: 2023-09-21
Attending: EMERGENCY MEDICINE
Payer: COMMERCIAL

## 2023-09-21 ENCOUNTER — HOSPITAL ENCOUNTER (OUTPATIENT)
Facility: CLINIC | Age: 83
Setting detail: OBSERVATION
Discharge: NURSING, REHAB OR LONG-TERM CARE | End: 2023-09-23
Attending: EMERGENCY MEDICINE | Admitting: HOSPITALIST
Payer: COMMERCIAL

## 2023-09-21 VITALS
SYSTOLIC BLOOD PRESSURE: 113 MMHG | HEIGHT: 68 IN | OXYGEN SATURATION: 91 % | DIASTOLIC BLOOD PRESSURE: 67 MMHG | TEMPERATURE: 97.8 F | BODY MASS INDEX: 36.37 KG/M2 | HEART RATE: 107 BPM | WEIGHT: 240 LBS | RESPIRATION RATE: 18 BRPM

## 2023-09-21 DIAGNOSIS — B99.9 CONFUSION ASSOCIATED WITH INFECTION: ICD-10-CM

## 2023-09-21 DIAGNOSIS — R41.0 CONFUSION ASSOCIATED WITH INFECTION: ICD-10-CM

## 2023-09-21 DIAGNOSIS — S09.90XA TRAUMATIC INJURY OF HEAD, INITIAL ENCOUNTER: ICD-10-CM

## 2023-09-21 DIAGNOSIS — R26.81 UNSTEADY GAIT: Primary | ICD-10-CM

## 2023-09-21 DIAGNOSIS — R41.82 ALTERED MENTAL STATUS, UNSPECIFIED ALTERED MENTAL STATUS TYPE: ICD-10-CM

## 2023-09-21 DIAGNOSIS — R60.0 BILATERAL LEG EDEMA: ICD-10-CM

## 2023-09-21 DIAGNOSIS — N30.00 ACUTE CYSTITIS WITHOUT HEMATURIA: Primary | ICD-10-CM

## 2023-09-21 DIAGNOSIS — R29.6 FREQUENT FALLS: ICD-10-CM

## 2023-09-21 DIAGNOSIS — G62.9 PERIPHERAL POLYNEUROPATHY: ICD-10-CM

## 2023-09-21 DIAGNOSIS — I48.20 CHRONIC ATRIAL FIBRILLATION (H): ICD-10-CM

## 2023-09-21 DIAGNOSIS — Z79.01 LONG TERM CURRENT USE OF ANTICOAGULANT THERAPY: ICD-10-CM

## 2023-09-21 PROBLEM — G93.40 ACUTE ENCEPHALOPATHY: Chronic | Status: ACTIVE | Noted: 2023-09-21

## 2023-09-21 PROBLEM — G93.40 ACUTE ENCEPHALOPATHY: Status: ACTIVE | Noted: 2023-09-21

## 2023-09-21 LAB
ABO/RH(D): NORMAL
ALBUMIN UR-MCNC: 20 MG/DL
ANION GAP SERPL CALCULATED.3IONS-SCNC: 13 MMOL/L (ref 7–15)
ANTIBODY SCREEN: NEGATIVE
APPEARANCE UR: ABNORMAL
APTT PPP: 47 SECONDS (ref 22–38)
ATRIAL RATE - MUSE: 92 BPM
BACTERIA #/AREA URNS HPF: ABNORMAL /HPF
BASOPHILS # BLD AUTO: 0.1 10E3/UL (ref 0–0.2)
BASOPHILS NFR BLD AUTO: 1 %
BILIRUB UR QL STRIP: NEGATIVE
BUN SERPL-MCNC: 15.5 MG/DL (ref 8–23)
CALCIUM SERPL-MCNC: 9.3 MG/DL (ref 8.8–10.2)
CHLORIDE SERPL-SCNC: 94 MMOL/L (ref 98–107)
COLOR UR AUTO: YELLOW
CREAT SERPL-MCNC: 0.93 MG/DL (ref 0.51–0.95)
DEPRECATED HCO3 PLAS-SCNC: 26 MMOL/L (ref 22–29)
DIASTOLIC BLOOD PRESSURE - MUSE: NORMAL MMHG
EGFRCR SERPLBLD CKD-EPI 2021: 62 ML/MIN/1.73M2
EOSINOPHIL # BLD AUTO: 0.1 10E3/UL (ref 0–0.7)
EOSINOPHIL NFR BLD AUTO: 1 %
ERYTHROCYTE [DISTWIDTH] IN BLOOD BY AUTOMATED COUNT: 16.3 % (ref 10–15)
GLUCOSE BLDC GLUCOMTR-MCNC: 124 MG/DL (ref 70–99)
GLUCOSE BLDC GLUCOMTR-MCNC: 137 MG/DL (ref 70–99)
GLUCOSE SERPL-MCNC: 158 MG/DL (ref 70–99)
GLUCOSE UR STRIP-MCNC: NEGATIVE MG/DL
HCT VFR BLD AUTO: 40.4 % (ref 35–47)
HGB BLD-MCNC: 13.4 G/DL (ref 11.7–15.7)
HGB UR QL STRIP: ABNORMAL
HYALINE CASTS: 3 /LPF
IMM GRANULOCYTES # BLD: 0 10E3/UL
IMM GRANULOCYTES NFR BLD: 0 %
INR PPP: 1.92 (ref 0.85–1.15)
INTERPRETATION ECG - MUSE: NORMAL
KETONES UR STRIP-MCNC: NEGATIVE MG/DL
LEUKOCYTE ESTERASE UR QL STRIP: ABNORMAL
LYMPHOCYTES # BLD AUTO: 1.3 10E3/UL (ref 0.8–5.3)
LYMPHOCYTES NFR BLD AUTO: 13 %
MCH RBC QN AUTO: 28.6 PG (ref 26.5–33)
MCHC RBC AUTO-ENTMCNC: 33.2 G/DL (ref 31.5–36.5)
MCV RBC AUTO: 86 FL (ref 78–100)
MONOCYTES # BLD AUTO: 1.3 10E3/UL (ref 0–1.3)
MONOCYTES NFR BLD AUTO: 13 %
MUCOUS THREADS #/AREA URNS LPF: PRESENT /LPF
NEUTROPHILS # BLD AUTO: 7.1 10E3/UL (ref 1.6–8.3)
NEUTROPHILS NFR BLD AUTO: 72 %
NITRATE UR QL: NEGATIVE
NRBC # BLD AUTO: 0 10E3/UL
NRBC BLD AUTO-RTO: 0 /100
P AXIS - MUSE: NORMAL DEGREES
PH UR STRIP: 6.5 [PH] (ref 5–7)
PLATELET # BLD AUTO: 147 10E3/UL (ref 150–450)
POTASSIUM SERPL-SCNC: 4.3 MMOL/L (ref 3.4–5.3)
PR INTERVAL - MUSE: NORMAL MS
QRS DURATION - MUSE: 100 MS
QT - MUSE: 388 MS
QTC - MUSE: 461 MS
R AXIS - MUSE: 6 DEGREES
RBC # BLD AUTO: 4.68 10E6/UL (ref 3.8–5.2)
RBC URINE: 14 /HPF
SODIUM SERPL-SCNC: 133 MMOL/L (ref 136–145)
SP GR UR STRIP: 1.02 (ref 1–1.03)
SPECIMEN EXPIRATION DATE: NORMAL
SQUAMOUS EPITHELIAL: 1 /HPF
SYSTOLIC BLOOD PRESSURE - MUSE: NORMAL MMHG
T AXIS - MUSE: 15 DEGREES
UROBILINOGEN UR STRIP-MCNC: 3 MG/DL
VENTRICULAR RATE- MUSE: 85 BPM
WBC # BLD AUTO: 9.9 10E3/UL (ref 4–11)
WBC CLUMPS #/AREA URNS HPF: PRESENT /HPF
WBC URINE: >182 /HPF

## 2023-09-21 PROCEDURE — 70450 CT HEAD/BRAIN W/O DYE: CPT

## 2023-09-21 PROCEDURE — 87086 URINE CULTURE/COLONY COUNT: CPT | Performed by: EMERGENCY MEDICINE

## 2023-09-21 PROCEDURE — G0378 HOSPITAL OBSERVATION PER HR: HCPCS

## 2023-09-21 PROCEDURE — 96361 HYDRATE IV INFUSION ADD-ON: CPT

## 2023-09-21 PROCEDURE — 82962 GLUCOSE BLOOD TEST: CPT

## 2023-09-21 PROCEDURE — 72125 CT NECK SPINE W/O DYE: CPT

## 2023-09-21 PROCEDURE — 80048 BASIC METABOLIC PNL TOTAL CA: CPT | Performed by: EMERGENCY MEDICINE

## 2023-09-21 PROCEDURE — 99285 EMERGENCY DEPT VISIT HI MDM: CPT | Mod: 25

## 2023-09-21 PROCEDURE — 93005 ELECTROCARDIOGRAM TRACING: CPT

## 2023-09-21 PROCEDURE — 85730 THROMBOPLASTIN TIME PARTIAL: CPT | Performed by: EMERGENCY MEDICINE

## 2023-09-21 PROCEDURE — 250N000013 HC RX MED GY IP 250 OP 250 PS 637: Performed by: PHYSICIAN ASSISTANT

## 2023-09-21 PROCEDURE — 250N000011 HC RX IP 250 OP 636: Performed by: EMERGENCY MEDICINE

## 2023-09-21 PROCEDURE — 86901 BLOOD TYPING SEROLOGIC RH(D): CPT | Performed by: EMERGENCY MEDICINE

## 2023-09-21 PROCEDURE — 70551 MRI BRAIN STEM W/O DYE: CPT

## 2023-09-21 PROCEDURE — 96374 THER/PROPH/DIAG INJ IV PUSH: CPT | Mod: 59

## 2023-09-21 PROCEDURE — 81003 URINALYSIS AUTO W/O SCOPE: CPT | Performed by: EMERGENCY MEDICINE

## 2023-09-21 PROCEDURE — 36415 COLL VENOUS BLD VENIPUNCTURE: CPT | Performed by: EMERGENCY MEDICINE

## 2023-09-21 PROCEDURE — 85025 COMPLETE CBC W/AUTO DIFF WBC: CPT | Performed by: EMERGENCY MEDICINE

## 2023-09-21 PROCEDURE — 86850 RBC ANTIBODY SCREEN: CPT | Performed by: EMERGENCY MEDICINE

## 2023-09-21 PROCEDURE — 85610 PROTHROMBIN TIME: CPT | Performed by: EMERGENCY MEDICINE

## 2023-09-21 PROCEDURE — 99223 1ST HOSP IP/OBS HIGH 75: CPT | Mod: AI | Performed by: PHYSICIAN ASSISTANT

## 2023-09-21 PROCEDURE — 99214 OFFICE O/P EST MOD 30 MIN: CPT | Performed by: INTERNAL MEDICINE

## 2023-09-21 PROCEDURE — 250N000013 HC RX MED GY IP 250 OP 250 PS 637: Performed by: HOSPITALIST

## 2023-09-21 PROCEDURE — 258N000003 HC RX IP 258 OP 636: Performed by: PHYSICIAN ASSISTANT

## 2023-09-21 RX ORDER — NICOTINE POLACRILEX 4 MG
15-30 LOZENGE BUCCAL
Status: DISCONTINUED | OUTPATIENT
Start: 2023-09-21 | End: 2023-09-23 | Stop reason: HOSPADM

## 2023-09-21 RX ORDER — GLIPIZIDE 2.5 MG/1
2.5 TABLET, EXTENDED RELEASE ORAL 2 TIMES DAILY WITH MEALS
Status: DISCONTINUED | OUTPATIENT
Start: 2023-09-21 | End: 2023-09-23 | Stop reason: HOSPADM

## 2023-09-21 RX ORDER — ACETAMINOPHEN 325 MG/1
650 TABLET ORAL EVERY 6 HOURS PRN
Status: DISCONTINUED | OUTPATIENT
Start: 2023-09-21 | End: 2023-09-23 | Stop reason: HOSPADM

## 2023-09-21 RX ORDER — DULOXETIN HYDROCHLORIDE 20 MG/1
20 CAPSULE, DELAYED RELEASE ORAL DAILY
Status: DISCONTINUED | OUTPATIENT
Start: 2023-09-21 | End: 2023-09-23 | Stop reason: HOSPADM

## 2023-09-21 RX ORDER — LORAZEPAM 2 MG/ML
0.5 INJECTION INTRAMUSCULAR ONCE
Status: COMPLETED | OUTPATIENT
Start: 2023-09-21 | End: 2023-09-21

## 2023-09-21 RX ORDER — ACETAMINOPHEN 650 MG/1
650 SUPPOSITORY RECTAL EVERY 6 HOURS PRN
Status: DISCONTINUED | OUTPATIENT
Start: 2023-09-21 | End: 2023-09-23 | Stop reason: HOSPADM

## 2023-09-21 RX ORDER — ACETAMINOPHEN 500 MG
1000 TABLET ORAL EVERY 6 HOURS PRN
COMMUNITY

## 2023-09-21 RX ORDER — LIDOCAINE 40 MG/G
CREAM TOPICAL
Status: DISCONTINUED | OUTPATIENT
Start: 2023-09-21 | End: 2023-09-23 | Stop reason: HOSPADM

## 2023-09-21 RX ORDER — BUTALBITAL, ACETAMINOPHEN AND CAFFEINE 50; 325; 40 MG/1; MG/1; MG/1
1 TABLET ORAL EVERY 4 HOURS PRN
Status: DISCONTINUED | OUTPATIENT
Start: 2023-09-21 | End: 2023-09-23 | Stop reason: HOSPADM

## 2023-09-21 RX ORDER — ONDANSETRON 4 MG/1
4 TABLET, ORALLY DISINTEGRATING ORAL EVERY 6 HOURS PRN
Status: DISCONTINUED | OUTPATIENT
Start: 2023-09-21 | End: 2023-09-23 | Stop reason: HOSPADM

## 2023-09-21 RX ORDER — INDAPAMIDE 1.25 MG/1
1.25 TABLET ORAL EVERY MORNING
Status: DISCONTINUED | OUTPATIENT
Start: 2023-09-22 | End: 2023-09-23 | Stop reason: HOSPADM

## 2023-09-21 RX ORDER — SODIUM CHLORIDE, SODIUM LACTATE, POTASSIUM CHLORIDE, CALCIUM CHLORIDE 600; 310; 30; 20 MG/100ML; MG/100ML; MG/100ML; MG/100ML
INJECTION, SOLUTION INTRAVENOUS CONTINUOUS
Status: DISCONTINUED | OUTPATIENT
Start: 2023-09-21 | End: 2023-09-21

## 2023-09-21 RX ORDER — WARFARIN SODIUM 2.5 MG/1
2.5 TABLET ORAL
Status: COMPLETED | OUTPATIENT
Start: 2023-09-21 | End: 2023-09-21

## 2023-09-21 RX ORDER — CEFTRIAXONE 1 G/1
1 INJECTION, POWDER, FOR SOLUTION INTRAMUSCULAR; INTRAVENOUS ONCE
Status: DISCONTINUED | OUTPATIENT
Start: 2023-09-21 | End: 2023-09-21

## 2023-09-21 RX ORDER — ONDANSETRON 8 MG/1
8 TABLET, FILM COATED ORAL EVERY 8 HOURS PRN
COMMUNITY
End: 2024-01-12

## 2023-09-21 RX ORDER — ONDANSETRON 2 MG/ML
4 INJECTION INTRAMUSCULAR; INTRAVENOUS EVERY 6 HOURS PRN
Status: DISCONTINUED | OUTPATIENT
Start: 2023-09-21 | End: 2023-09-23 | Stop reason: HOSPADM

## 2023-09-21 RX ORDER — DEXTROSE MONOHYDRATE 25 G/50ML
25-50 INJECTION, SOLUTION INTRAVENOUS
Status: DISCONTINUED | OUTPATIENT
Start: 2023-09-21 | End: 2023-09-23 | Stop reason: HOSPADM

## 2023-09-21 RX ORDER — SODIUM CHLORIDE, SODIUM LACTATE, POTASSIUM CHLORIDE, CALCIUM CHLORIDE 600; 310; 30; 20 MG/100ML; MG/100ML; MG/100ML; MG/100ML
INJECTION, SOLUTION INTRAVENOUS CONTINUOUS
Status: DISCONTINUED | OUTPATIENT
Start: 2023-09-21 | End: 2023-09-22

## 2023-09-21 RX ADMIN — ACETAMINOPHEN 650 MG: 325 TABLET, FILM COATED ORAL at 17:12

## 2023-09-21 RX ADMIN — WARFARIN SODIUM 2.5 MG: 2.5 TABLET ORAL at 17:43

## 2023-09-21 RX ADMIN — LORAZEPAM 0.5 MG: 2 INJECTION INTRAMUSCULAR; INTRAVENOUS at 11:57

## 2023-09-21 RX ADMIN — SODIUM CHLORIDE, POTASSIUM CHLORIDE, SODIUM LACTATE AND CALCIUM CHLORIDE: 600; 310; 30; 20 INJECTION, SOLUTION INTRAVENOUS at 16:51

## 2023-09-21 ASSESSMENT — ACTIVITIES OF DAILY LIVING (ADL)
ADLS_ACUITY_SCORE: 32
ADLS_ACUITY_SCORE: 35
ADLS_ACUITY_SCORE: 34
ADLS_ACUITY_SCORE: 35
ADLS_ACUITY_SCORE: 35
ADLS_ACUITY_SCORE: 32
ADLS_ACUITY_SCORE: 32

## 2023-09-21 ASSESSMENT — PAIN SCALES - GENERAL: PAINLEVEL: NO PAIN (0)

## 2023-09-21 NOTE — ED NOTES
RECEIVING UNIT ED HANDOFF REVIEW    Above ED Nurse Handoff Report was reviewed: Yes  Reviewed by: Siria Forman RN on September 21, 2023 at 2:10 PM     [facility]  ED Nurse Handoff Report    ED Chief complaint: Altered Mental Status  . ED Diagnosis:   Final diagnoses:   Confusion associated with infection       Allergies:   Allergies   Allergen Reactions    Augmented Betamethasone Diprop [Betamethasone] Other (See Comments)     Severe yeast infection    Petroleum Jelly [Petrolatum] Anaphylaxis     Rash and swelling    Shellfish-Derived Products Anaphylaxis     Tongue swelling    Aspirin Swelling     tiongue swelling    Bacitracin      Rash swelling    Bactrim [Sulfamethoxazole-Trimethoprim] Dizziness    Coumadin [Warfarin] Swelling     Leg swelling    Darvon [Propoxyphene] Swelling     Throat closes    Dilaudid [Hydromorphone]      No side effects from fentanyl June 2023    Levaquin [Levofloxacin] Swelling     Tongue swelling    Lidocaine     Neomycin Swelling     rash    Neosporin [Neomycin-Polymyxin-Gramicidin] Swelling     rash    Nitrofurantoin      SOB, GI upset,    Oxycodone      Severe itching    Percodan [Oxycodone-Aspirin]      Severe itching    Polymyxin B     Pramoxine     Tramadol     Vicodin [Hydrocodone-Acetaminophen]      Severe itching      Xarelto [Rivaroxaban]     Adhesive Tape Rash     Band aids     Codeine Rash    Hydrocortisone Rash and Swelling    Other Environmental Allergy Rash     Adhesive tape   Band aids        Code Status: Full Code    Activity level - Baseline/Home:  walker.  Activity Level - Current:   in bed.   Lift room needed: No.   Bariatric: No   Needed: No   Isolation: No.   Infection: Not Applicable.     Respiratory status: Room air    Vital Signs (within 30 minutes):   Vitals:    09/21/23 0950 09/21/23 1145 09/21/23 1245   BP: (!) 142/69 (!) 140/88 132/61   Pulse: 95 80    Resp: 20 12    Temp: 97.3  F (36.3  C)     SpO2: 93% 94%        Cardiac  Rhythm:  ,      Pain level:    Patient confused: Yes.   Patient Falls Risk: assistive device/personal items within reach, activity supervised, mobility aid in reach, and room door open.   Elimination Status: Has voided     Patient Report - Initial Complaint: Confusion, fall.   Focused Assessment: Pt arrives with daughter from home after daughter called to check in and pt appeared confused. Pt reports ground level fall on Saturday. Was doing well but having difficulty finding her words today.      Abnormal Results:   Labs Ordered and Resulted from Time of ED Arrival to Time of ED Departure   BASIC METABOLIC PANEL - Abnormal       Result Value    Sodium 133 (*)     Potassium 4.3      Chloride 94 (*)     Carbon Dioxide (CO2) 26      Anion Gap 13      Urea Nitrogen 15.5      Creatinine 0.93      Calcium 9.3      Glucose 158 (*)     GFR Estimate 62     ROUTINE UA WITH MICROSCOPIC REFLEX TO CULTURE - Abnormal    Color Urine Yellow      Appearance Urine Slightly Cloudy (*)     Glucose Urine Negative      Bilirubin Urine Negative      Ketones Urine Negative      Specific Gravity Urine 1.017      Blood Urine Trace (*)     pH Urine 6.5      Protein Albumin Urine 20 (*)     Urobilinogen Urine 3.0 (*)     Nitrite Urine Negative      Leukocyte Esterase Urine Large (*)     Bacteria Urine Few (*)     WBC Clumps Urine Present (*)     Mucus Urine Present (*)     RBC Urine 14 (*)     WBC Urine >182 (*)     Squamous Epithelials Urine 1      Hyaline Casts Urine 3 (*)    CBC WITH PLATELETS AND DIFFERENTIAL - Abnormal    WBC Count 9.9      RBC Count 4.68      Hemoglobin 13.4      Hematocrit 40.4      MCV 86      MCH 28.6      MCHC 33.2      RDW 16.3 (*)     Platelet Count 147 (*)     % Neutrophils 72      % Lymphocytes 13      % Monocytes 13      % Eosinophils 1      % Basophils 1      % Immature Granulocytes 0      NRBCs per 100 WBC 0      Absolute Neutrophils 7.1      Absolute Lymphocytes 1.3      Absolute Monocytes 1.3      Absolute  Eosinophils 0.1      Absolute Basophils 0.1      Absolute Immature Granulocytes 0.0      Absolute NRBCs 0.0     INR - Abnormal    INR 1.92 (*)    PARTIAL THROMBOPLASTIN TIME - Abnormal    aPTT 47 (*)    INR   TYPE AND SCREEN, ADULT    ABO/RH(D) O NEG      Antibody Screen Negative      SPECIMEN EXPIRATION DATE [id number]     URINE CULTURE   ABO/RH TYPE AND SCREEN        MR Brain w/o Contrast   Preliminary Result   IMPRESSION:      1. No evidence of acute infarct, mass, hemorrhage, or herniation.   2. Moderate diffuse parenchymal volume loss and white matter changes   likely due to chronic microvascular ischemic disease.       Cervical spine CT w/o contrast   Final Result   IMPRESSION:    1. No evidence of acute fracture or subluxation in the cervical spine.   2. Degenerative changes in the cervical spine as described above.       SONIA RODRIGUEZ MD            SYSTEM ID:  [id number]      Head CT w/o contrast   Final Result   IMPRESSION:      1. No evidence of acute intracranial hemorrhage, mass, or herniation.   2. Mild diffuse parenchymal volume loss and moderate white matter   changes likely due to chronic microvascular ischemic disease.    3. Chronic lacunar infarct in the right basal ganglia.   4. Left posterior scalp soft tissue swelling and hematoma. No   underlying calvarial fracture.         SONIA RODRIGUEZ MD            SYSTEM ID:  [id number]          Treatments provided: CT, MRI  Family Comments: Daughter at bedside  OBS brochure/video discussed/provided to patient:  Yes  ED Medications:   Medications   LORazepam (ATIVAN) injection 0.5 mg (0.5 mg Intravenous $Given 9/21/23 1157)       Drips infusing:  No  For the majority of the shift this patient was Green.   Interventions performed were CT, MRI.    Sepsis treatment initiated: No    Cares/treatment/interventions/medications to be completed following ED care: Monitor mental status.    ED Nurse Name: Chela Daly RN  12:58 PM

## 2023-09-21 NOTE — PROGRESS NOTES
ROOM # 220    Living Situation (if not independent, order SW consult): Pt states independent  Facility name:  : [facility]    Activity level at baseline: A1 GB/W  Activity level on admit: Independent w/ electric WC and walker    Who will be transporting you at discharge: Daughter    Patient registered to observation; given Patient Bill of Rights; given the opportunity to ask questions about observation status and their plan of care.  Patient has been oriented to the observation room, bathroom and call light is in place.    Discussed discharge goals and expectations with patient/family.     Pt arrived via ED cart, did not feel like she could ambulate. Encouraged pt to walk to room, pt able to ambulate with A1 GB/W, slow movements requiring direction. VSS on arrival to unit.

## 2023-09-21 NOTE — PROGRESS NOTES
Patient's instructions / PLAN:                                                        Plan:  I recommend to go now to ER  2. I recommend lift chair that also keeps feet up  3.      ASSESSMENT & PLAN:                                                      (R26.81) Unsteady gait  (primary encounter diagnosis)  (R29.6) Frequent falls  (G62.9) Peripheral polyneuropathy  (R60.0) Bilateral leg edema  Comment: Face to Face for lift chair  Plan: Lift Chair W Seat Lift Mechanism Order for DME         - ONLY FOR DME            (R41.82) Altered mental status, unspecified altered mental status type  (S09.90XA) Traumatic injury of head, initial encounter  (Z79.01) Long term current use of anticoagulant therapy  Comment:   Plan: ER       Chief Complaint:                                                        Discuss lift chair  MS changes since am    Daughter Roya is present    SUBJECTIVE:                                                    History of present illness   Fall  -- on Sept 16 w head trauma. Daughter though noticed she is more confused today. Agrees to take her to ER      Lift Chair needs  -- she has been unsteady on her feet more months.she uses a walker inside her home and a scooter outside.  -- she has generalized weakness that progressed over the last months, to the point she has difficulties getting up from the sofas and chair.  -- more she struggles to get up, more unsteady she becomes and she the lower back pain gets worse  -- she has chronic leg edemaand she needs to keep legs elevated  -- I talked to Mrs Rehman and I advised her for a recliner chair with the feature of keeping feet elevated and the feature of lifting   -- I asked her today to stand up from the walker. She grabbed the walker handles, she is rocking trying to get a momentum to stand up. Doing this increases the fall risk     ROS:                                                      ROS: negative for fever, chills, cough, wheezes, chest  "pain, shortness of breath, vomiting, abdominal pain, leg swelling     OBJECTIVE:                                                    Physical Exam :    Blood pressure 113/67, pulse 107, temperature 97.8  F (36.6  C), temperature source Tympanic, resp. rate 18, height 1.727 m (5' 8\"), weight 108.9 kg (240 lb), SpO2 91 %, not currently breastfeeding.   NAD, appears comfortable  Skin: no rashes   HEENT: hematoma on the R occipital area   Neck: supple, no JVD, No thyroidmegaly. Lymph nodes nonpalpable cervical and supraclavicular.  Chest: clear to auscultation bilaterally, good respiratory effort  Heart: S1 S2, RRR, no mgr appreciated  Abdomen: soft, not tender,   Extremities: 1edema,   Neurologic: A, Ox3, no focal signs appreciated,. But she acts differently: she answers slower, she has time to focus. She was unable to tell me what she ate yesterday     PMHx: reviewed  Past Medical History:   Diagnosis Date    Anemia     Iron Deficiency anemia    Atrial fibrillation (H)     CAD (coronary artery disease)     non-obstructive    Chronic pain     neck, low back, legs    Congestive heart failure (H)     Degenerative disk disease     Diabetes (H)     Fibromyalgia     Gastro-oesophageal reflux disease     Gout     Hiatal hernia     Mumps     Neuropathy     CRISTIANA (obstructive sleep apnea) - CPAP     Palpitations     Pernicious anemia     Sleep apnea     uses CPAP.    Urinary incontinence     Vitamin D deficiency       PSHx: reviewed  Past Surgical History:   Procedure Laterality Date    APPENDECTOMY      CHOLECYSTECTOMY      COLONOSCOPY  3/15/2011    COLONOSCOPY N/A 3/15/2023    Procedure: COLONOSCOPY, WITH POLYPECTOMY AND BIOPSY;  Surgeon: Maci Coronel MD;  Location: [facility]    COLONOSCOPY N/A 3/15/2023    Procedure: COLONOSCOPY, FLEXIBLE, WITH LESION REMOVAL USING SNARE;  Surgeon: Maci Coronel MD;  Location: [facility]    CORONARY ANGIOGRAPHY ADULT ORDER      CYSTOSCOPY, BIOPSY BLADDER, COMBINED N/A 7/13/2020    " Procedure: CYSTOSCOPY, WITH BLADDER BIOPSY;  Surgeon: Deisy Wilson MD;  Location: [facility] OR    HEART CATH LEFT HEART CATH  12/30/16    medication management    HYSTERECTOMY TOTAL ABDOMINAL      Knee replacement NOS Left     LAPAROSCOPIC NISSEN FUNDOPLICATION N/A 2/4/2015    Procedure: LAPAROSCOPIC NISSEN FUNDOPLICATION;  Surgeon: Armando Ansari MD;  Location: [facility] OR    TONSILLECTOMY      TRANSPOSITION ULNAR NERVE (ELBOW)          Meds: reviewed  Current Outpatient Medications   Medication Sig Dispense Refill    acetaminophen (TYLENOL) 325 MG tablet Take 3 tablets (975 mg) by mouth every 8 hours as needed for mild pain      blood glucose (NO BRAND SPECIFIED) lancets standard Use to test blood sugar  as directed. 1 each 0    blood glucose (NO BRAND SPECIFIED) lancing device Device to be used with lancets. Patient requests Amol Microlet 2 lancing device to check blood glucose once per day. 1 each 0    blood glucose (ONETOUCH ULTRA) test strip Use to test blood sugar 1 times daily 100 strip 1    blood glucose calibration (NO BRAND SPECIFIED) solution Use to calibrate blood glucose monitor 1 Bottle 3    blood glucose monitoring (NO BRAND SPECIFIED) meter device kit Use to test blood sugar 1 time daily 1 kit 0    butalbital-aspirin-caffeine (FIORINAL) -40 MG capsule TAKE 1 CAPSULE BY MOUTH EVERY 6 HOURS AS NEEDED FOR HEADACHE 15 capsule 0    Cholecalciferol (VITAMIN D-3) 125 MCG (5000 UT) TABS Take 5,000 Units by mouth daily      DULoxetine (CYMBALTA) 20 MG capsule Take 1 capsule (20 mg) by mouth daily 30 capsule 1    EPINEPHrine (ANY BX GENERIC EQUIV) 0.3 MG/0.3ML injection 2-pack INJECT 0.3MLS (0.3MG) INTO THE MUSCLE ONCE AS NEEDED FOR ANAPHYLAXIS 2 each 1    famotidine (PEPCID) 20 MG tablet Take 20 mg by mouth daily      glipiZIDE (GLUCOTROL XL) 2.5 MG 24 hr tablet Take 1 tablet (2.5 mg) by mouth 2 times daily 180 tablet 3    indapamide (LOZOL) 1.25 MG tablet Take 1 tablet (1.25 mg) by mouth every morning  30 tablet 3    loperamide (IMODIUM A-D) 2 MG tablet Take 2 mg by mouth 4 times daily as needed for diarrhea      meclizine (ANTIVERT) 12.5 MG tablet TAKE 1 TABLET BY MOUTH THREE TIMES DAILY AS NEEDED FOR DIZZINESS 30 tablet 0    METAMUCIL FIBER PO Take 2 tablets by mouth daily      mupirocin (BACTROBAN) 2 % external ointment Apply topically 3 times daily 15 g 0    polyethylene glycol (MIRALAX) 17 g packet Take 17 g by mouth daily as needed for constipation      senna-docusate (SENOKOT-S/PERICOLACE) 8.6-50 MG tablet Take 1 tablet by mouth daily as needed for constipation      Vitamin D3 (CHOLECALCIFEROL) 125 MCG (5000 UT) tablet Take 125 mcg by mouth daily      warfarin ANTICOAGULANT (COUMADIN) 2.5 MG tablet Take 1.5 tablet (3.75 mg) by mouth daily except take 1 tablet (2.5 mg) Mon, Th or as directed by INR clinic 125 tablet 1       Soc Hx: reviewed  Fam Hx: reviewed      Chart documentation was completed, in part, with [employer] voice-recognition software. Even though reviewed, some grammatical, spelling, and word errors may remain.      Patti Lauren MD  Internal Medicine

## 2023-09-21 NOTE — ED TRIAGE NOTES
Patient fell on Saturday. Hematoma and bruising to back of head. This morning, patient is altered per daughter. Not as talkative, disoriented, forgetful, increased weakness.

## 2023-09-21 NOTE — ED PROVIDER NOTES
History     Chief Complaint:  Altered Mental Status       The history is provided by the patient and a relative (daughter).      Savanna Rehman is a 82 year old female with history of diabetes mellitus type 2 and congestive heart failure who presents to the ED with altered mental status and fall. Patient is anticoagulated. Patient reports falling and hitting the back of her head on Saturday. Patient did not seek medical services after the fall. Daughter informs that patient was at baseline for the last 5 days until this morning. She noted the patient's behavior was different. She was having trouble finding words, was disoriented, mentally slower, and generally weaker. Of note, patient had a fall 3 months ago and has a large blood clot on her glutes that run down her leg.    Independent Historian:   Daughter - They report as noted above.    Review of External Notes:         Medications:    Coumadin  Tylenol   Antivert  Lozol  Pepcid  Cymbalta  Ditropan     Past Medical History:    Vitamin D deficiency   Urinary incontinence  Sleep apnea  Pernicious anemia  Neuropathy   Mumps  Hiatal hernia  Gout  Gastroesophageal reflux disease  Fibromyalgia   Diabetes mellitus type 2  Degenerative disk disease  Congestive heart failure  Coronary artery disease   Atrial fibrillation    Past Surgical History:    Transposition ulnar nerve  Tonsillectomy   Laparoscopic nissen fundoplication  Knee replacement, left  Hysterectomy total abdominal   Heart catheter, left  Coronary angiography adult order  Cholecystectomy appendectomy      Physical Exam   Patient Vitals for the past 24 hrs:   BP Temp Pulse Resp SpO2   09/21/23 0950 (!) 142/69 97.3  F (36.3  C) 95 20 93 %        Physical Exam  Vitals reviewed.   HENT:      Head: Normocephalic.      Mouth/Throat:      Mouth: Mucous membranes are moist.   Eyes:      Extraocular Movements: Extraocular movements intact.   Cardiovascular:      Rate and Rhythm: Normal rate and regular rhythm.    Abdominal:      Palpations: Abdomen is soft.   Musculoskeletal:         General: Normal range of motion.   Skin:     General: Skin is warm.      Capillary Refill: Capillary refill takes less than 2 seconds.   Neurological:      Mental Status: She is alert.       [employer] Stroke Scale  Exam Interval: Baseline   Score    Level of consciousness: (0)   Alert, keenly responsive    LOC questions: (0)   Answers both questions correctly    LOC commands: (0)   Performs both tasks correctly    Best gaze: (0)   Normal    Visual: (0)   No visual loss    Facial palsy: (0)   Normal symmetrical movements    Motor arm (left): (1)   Drift    Motor arm (right): (0)   No drift    Motor leg (left): (1)   Drift    Motor leg (right): (0)   No drift    Limb ataxia: (0)   Absent    Sensory: (0)   Normal- no sensory loss    Best language: (0)   Normal- no aphasia    Dysarthria: (0)   Normal    Extinction and inattention: (0)   No abnormality        Total Score:  2         Emergency Department Course   ECG  ECG taken at 1004, ECG read at 1010  Atrial fibrillation with premature ventricular or aberrantly conducted complexes  Nonspecific ST abnormality   Rate 85 bpm. OH interval * ms. QRS duration 100 ms. QT/QTc 388/461 ms. P-R-T axes * 6 15.     Imaging:  Cervical spine CT w/o contrast   Final Result   IMPRESSION:    1. No evidence of acute fracture or subluxation in the cervical spine.   2. Degenerative changes in the cervical spine as described above.       SONIA RODRIGUEZ MD            SYSTEM ID:  [id number]      Head CT w/o contrast   Final Result   IMPRESSION:      1. No evidence of acute intracranial hemorrhage, mass, or herniation.   2. Mild diffuse parenchymal volume loss and moderate white matter   changes likely due to chronic microvascular ischemic disease.    3. Chronic lacunar infarct in the right basal ganglia.   4. Left posterior scalp soft tissue swelling and hematoma. No   underlying calvarial fracture.          SONIA RODRIGUEZ MD            SYSTEM ID:  [id number]      MR Brain w/o Contrast    (Results Pending)      Report per radiology    Laboratory:  Labs Ordered and Resulted from Time of ED Arrival to Time of ED Departure   BASIC METABOLIC PANEL - Abnormal       Result Value    Sodium 133 (*)     Potassium 4.3      Chloride 94 (*)     Carbon Dioxide (CO2) 26      Anion Gap 13      Urea Nitrogen 15.5      Creatinine 0.93      Calcium 9.3      Glucose 158 (*)     GFR Estimate 62     CBC WITH PLATELETS AND DIFFERENTIAL - Abnormal    WBC Count 9.9      RBC Count 4.68      Hemoglobin 13.4      Hematocrit 40.4      MCV 86      MCH 28.6      MCHC 33.2      RDW 16.3 (*)     Platelet Count 147 (*)     % Neutrophils 72      % Lymphocytes 13      % Monocytes 13      % Eosinophils 1      % Basophils 1      % Immature Granulocytes 0      NRBCs per 100 WBC 0      Absolute Neutrophils 7.1      Absolute Lymphocytes 1.3      Absolute Monocytes 1.3      Absolute Eosinophils 0.1      Absolute Basophils 0.1      Absolute Immature Granulocytes 0.0      Absolute NRBCs 0.0     INR - Abnormal    INR 1.92 (*)    PARTIAL THROMBOPLASTIN TIME - Abnormal    aPTT 47 (*)    ROUTINE UA WITH MICROSCOPIC REFLEX TO CULTURE   INR   TYPE AND SCREEN, ADULT    ABO/RH(D) O NEG      Antibody Screen Negative      SPECIMEN EXPIRATION DATE [id number]     ABO/RH TYPE AND SCREEN        Emergency Department Course & Assessments:    Interventions:  Medications   LORazepam (ATIVAN) injection 0.5 mg (has no administration in time range)        Assessments:  1002 I obtained the history and examined the patient as noted above.    Independent Interpretation (X-rays, CTs, rhythm strip):    Consultations/Discussion of Management or Tests:  1217 I consulted with Dr. Oconnor, hospitalist regarding the patient.        Social Determinants of Health affecting care:   None    Disposition:  The patient was admitted to the hospital under the care of Dr. Doshi.     Impression &  Plan      Medical Decision Making:  Patient has acute altered mental status.  There was a concern for weakness.  CT negative for acute intracranial hemorrhage.  MRI ordered due to A-fib history.  Due to acute altered mental status recommended observation admission.  Patient does have a UTI wonder about UTI related confusion.  For now would recommend observation admission for full assessment.  Care was discussed with the hospitalist and was admitted on observation.    Diagnosis:    ICD-10-CM    1. Acute cystitis without hematuria  N30.00 amoxicillin-clavulanate (AUGMENTIN) 875-125 MG tablet      2. Confusion associated with infection  B99.9     R41.0       3. Chronic atrial fibrillation (H)  I48.20 warfarin ANTICOAGULANT (COUMADIN) 2.5 MG tablet           Scribe Disclosure:  REX, Sorin Armendariz, am serving as a scribe at 10:28 AM on 9/21/2023 to document services personally performed by Dwayne Her MD based on my observations and the provider's statements to me.     9/21/2023   Dwayne Her MD Goodman, Brian Samuel, MD  09/24/23 3162

## 2023-09-21 NOTE — PHARMACY-ANTICOAGULATION SERVICE
Clinical Pharmacy - Warfarin Dosing Consult     Pharmacy has been consulted to manage this patient s warfarin therapy.  Indication: Atrial Fibrillation  Therapy Goal: INR 2-3  Warfarin Prior to Admission: Yes  Warfarin PTA Regimen: 2.5 mg MonThu, 3.75 mg ROW    INR   Date Value Ref Range Status   09/21/2023 1.92 (H) 0.85 - 1.15 Final     INR (External)   Date Value Ref Range Status   09/15/2023 2.0 (A) 0.9 - 1.1 Final       Recommend warfarin 2.5 mg today.  Pharmacy will monitor Savanna Rehman daily and order warfarin doses to achieve specified goal.      Please contact pharmacy as soon as possible if the warfarin needs to be held for a procedure or if the warfarin goals change.

## 2023-09-21 NOTE — PHARMACY-ADMISSION MEDICATION HISTORY
Pharmacist Admission Medication History    Admission medication history is complete. The information provided in this note is only as accurate as the sources available at the time of the update.    Medication reconciliation/reorder completed by provider prior to medication history? Yes    Information Source(s): Caregiver, Clinic records, and CareEverywhere/SureScripts via phone    Pertinent Information: RN fills med box weekly.     Changes made to PTA medication list:  Added: Zofran  Deleted: Miralax, Senna-S, Bactroban ointment (15 g filled 7/25/23).  Changed: Tylenol from 975 mg to 1000 mg q6h prn    Medication Affordability:  Not including over the counter (OTC) medications, was there a time in the past 3 months when you did not take your medications as prescribed because of cost?: Unable to Assess    Allergies reviewed with patient and updates made in EHR: no    Medication History Completed By: Rodriguez Leary [facility] 9/21/2023 4:17 PM    Prior to Admission medications    Medication Sig Last Dose Taking? Auth Provider Long Term End Date   acetaminophen (TYLENOL) 500 MG tablet Take 1,000 mg by mouth every 6 hours as needed for mild pain Unknown Yes Unknown, Entered By History     butalbital-aspirin-caffeine (FIORINAL) -40 MG capsule TAKE 1 CAPSULE BY MOUTH EVERY 6 HOURS AS NEEDED FOR HEADACHE Unknown Yes Prabha Delgadillo APRN CNP     Cholecalciferol (VITAMIN D-3) 125 MCG (5000 UT) TABS Take 5,000 Units by mouth daily 9/20/2023 at AM Yes Patti Suárez MD     DULoxetine (CYMBALTA) 20 MG capsule Take 1 capsule (20 mg) by mouth daily 9/20/2023 at AM Yes Patti Suárez MD Yes    EPINEPHrine (ANY BX GENERIC EQUIV) 0.3 MG/0.3ML injection 2-pack INJECT 0.3MLS (0.3MG) INTO THE MUSCLE ONCE AS NEEDED FOR ANAPHYLAXIS  Yes Patti Suárez MD     famotidine (PEPCID) 20 MG tablet Take 20 mg by mouth daily 9/20/2023 at AM Yes Unknown, Entered By History     glipiZIDE  (GLUCOTROL XL) 2.5 MG 24 hr tablet Take 1 tablet (2.5 mg) by mouth 2 times daily 9/20/2023 at PM Yes Patti Suárez MD Yes    indapamide (LOZOL) 1.25 MG tablet Take 1 tablet (1.25 mg) by mouth every morning 9/20/2023 at AM Yes Patti Suárez MD Yes    loperamide (IMODIUM A-D) 2 MG tablet Take 2 mg by mouth 4 times daily as needed for diarrhea  Yes Reported, Patient     meclizine (ANTIVERT) 12.5 MG tablet TAKE 1 TABLET BY MOUTH THREE TIMES DAILY AS NEEDED FOR DIZZINESS  Yes Patti Suárez MD     ondansetron (ZOFRAN) 8 MG tablet Take 8 mg by mouth every 8 hours as needed for nausea  Yes Unknown, Entered By History     psyllium (METAMUCIL/KONSYL) capsule Take 2 capsules by mouth 2 times daily 0.4 mg capsules 9/20/2023 at PM Yes Unknown, Entered By History     warfarin ANTICOAGULANT (COUMADIN) 2.5 MG tablet Take 1.5 tablet (3.75 mg) by mouth daily except take 1 tablet (2.5 mg) Mon, Th or as directed by INR clinic  Patient taking differently: Take by mouth daily Take 1.5 tablet (3.75 mg) by mouth daily except take 1 tablet (2.5 mg) Mon and Thurs; or as directed by INR clinic 9/20/2023 at PM Yes Agusto Schmidt MD     blood glucose (NO BRAND SPECIFIED) lancets standard Use to test blood sugar  as directed.   Patti Suárez MD     blood glucose (NO BRAND SPECIFIED) lancing device Device to be used with lancets. Patient requests [employer]let 2 lancing device to check blood glucose once per day.   Patti Suárez MD     blood glucose (ONETOUCH ULTRA) test strip Use to test blood sugar 1 times daily   Patti Suárez MD     blood glucose calibration (NO BRAND SPECIFIED) solution Use to calibrate blood glucose monitor   Patti Suárez MD     blood glucose monitoring (NO BRAND SPECIFIED) meter device kit Use to test blood sugar 1 time daily   Patti Suárez MD

## 2023-09-21 NOTE — PLAN OF CARE
PRIMARY DIAGNOSIS: Confusion  OUTPATIENT/OBSERVATION GOALS TO BE MET BEFORE DISCHARGE:  ADLs back to baseline: Yes    Activity and level of assistance: Up with standby assistance/A1.    Pain status: Pain free.    Return to near baseline physical activity: Yes     Discharge Planner Nurse   Safe discharge environment identified: Yes  Barriers to discharge: Yes       Entered by: Siria Forman RN 09/21/2023 4:44 PM     Please review provider order for any additional goals.   Nurse to notify provider when observation goals have been met and patient is ready for discharge.Goal Outcome Evaluation:      Plan of Care Reviewed With: patient, caregiver    Overall Patient Progress: improvingOverall Patient Progress: improving     Pt A&O x4; vss on r/a. No confusion or word-finding difficulty noted on assessment. Pt ambulating with GB/W A1. Purewick in place from ED, pt requesting to keep in place d/t dizziness when ambulating. Pt tolerating clears and oral meds; diet advanced not yet attempted. Friend and bedside; pts home RN updated. Pt calls for cares.

## 2023-09-21 NOTE — H&P
[facility]    History and Physical - Hospitalist Service       Date of Admission:  9/21/2023    Assessment & Plan Savanna Rehman is a 82 year old female with Pmhx of a fib anticoagulated on coumadin, HTN, HFpEF, DM type II, GERD, CRISTIANA, who was admitted on 9/21/2023 to observation after presenting for evaluation of altered mental status.     Acute Encephalopathy  Presented for evaluation of altered behavior changes noted AM of 9/21 described as increased from baseline word finding difficulty, slower response time and global weakness.  Also had episode of visual hallucination in the AM seeing her daughter in her apartment.  At this time her symptoms are suspected to be either due to a postconcussive syndrome or developing a UTI versus delirium with possible underlying cognitive impairment issues.  Lower suspicion for dehydration as cause but also considered- her labs look all right although she reports very minimal water intake and previous episodes of loose stool, resolved. Imaging negative for new acute pathology to explain behavior. No noted medication changes associated.  Nonseptic appearing.  Of note prior SLUMS score of 26 out of 30 when in TCU after a fall in June.  No formal diagnosis of dementia she has had history of memory issues since she equates she had COVID in July 2022. 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  -admit obs  -treat for possible UTI with abx  -Symptoms are 50% improved monitor for resolution  -request pt/ot/sw consults.  May need increased services at her apartment   -supportive cares for concussion  -would not recommend starting aspirin at this time, continue PTA warfarin   -Recommend ongoing OT screening, slums etc as outpatient     Possible UTI vs asymptomatic bacteruria   Patient is a poor historian but currently denies any dysuria, hematuria.   "Reports chronic mild urinary frequency she equates to her medications. No flank or suprapubic pain.     UA via straight cath with trace blood, large LE, pyuria.  Nitrite negative.  Culture was added.   Has history of UTIs, Last urine culture gin feb grew pan susceptible E. Coli, in June multiple morphotypes without predominant organism.  Rocephin ordered but was not given in ED.   -follow ucx  -Given she has persistent confusion and abnormal urinalysis with history of UTIs would treat empirically at this point even in the absence of focal UTI symptoms, follow urine culture results.  -Start amoxicillin 5-day course.  Would avoid Bactrim given adverse reaction reported of \"dizziness \"multiple intolerances noted to medications and allergies.    Fall with closed head injury, scalp hematoma  Occurred on 9/17, no syncope. Imaging neg. has history of falls and chronic gait instability  -supportive cares  -therapy evals as above     Mild hyponatremia  -repeat bmp in am    Chronic A fib   - anticoagulated on coumadin, resume. INR slightly sub therapeutic   - not on rate controlling medications     HTN  HFpEF  LE edema  Appears hypovolemic. Last TTE with EF EF 55-60% on last echo in 2020. Mod tricuspid regurg.    - cont PTA Indapamide     DM type II    - resumed home glipizide    - glucose checks BID and hs    - hypoglycemia protocol     CRISTIANA    - has CPAP but not compliant         Diet:  Mod carb    DVT Prophylaxis: Pneumatic Compression Devices  Aguilar Catheter: Not present    Cardiac Monitoring: None    Code Status:  Full c/w prior     Clinically Significant Risk Factors Present on Admission               # Drug Induced Coagulation Defect: home medication list includes an anticoagulant medication         # Obesity: Estimated body mass index is 36.03 kg/m  as calculated from the following:    Height as of this encounter: 1.727 m (5' 8\").    Weight as of this encounter: 107.5 kg (236 lb 15.9 oz).              Disposition Plan " "     Expected Discharge Date: 09/22/2023                  Emilyalaina ORTIZ-NATALI    ______________________________________________________________________    Chief Complaint   \"Altered mental status\"    History is obtained from the patient, mostly with assistance from her dear friend who is at bedside as the patient is a poor historian.    History of Present Illness   Savanna Rehman is a 82 year old female who was referred to the emergency department by her primary care doctor today.  She had a fall on September 16 where she hit her head her daughter had concern for increased confusion and thus she was referred to the ED.    Of note, she was last admitted in June 23 after a fall hematoma, family were concerned about safety at home and desired TCU placement she is also had tremors. In the interim returned home to her apartment with some assistance from a home RN 1x a week.     On presentation initially hypertensive afebrile vital signs are stable.  Lab work with mild hyponatremia, CBC unremarkable.  INR is 1.92.  Type and cross was added.  UA via straight cath with trace blood, large LE, pyuria.  Nitrite negative.  Culture was added. EKG with afib, v rate 85.   Imaging was performed in the ED given history of head injury and concern for confusion.   Noncontrast head CT showed left posterior scalp soft tissue swelling and hematoma with no skull fractures no acute ICH or other pathology.  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  CT C-spine negative.  MR Brain negative.      In the ED given ativan.     Admission was requested from hospitalist service for \"altered mental status\".     On arrival to [facility] floor the patient's friend is at her bedside.  Savanna reports that she is in the hospital for confusion. She has a headache.  She reports that this morning when she woke up she was having a visual " hallucination and seeing her daughter and her apartment when she was not there.  Her friend states that this morning she was speaking nonsensically.  Normally Savanna does have some issues with word finding and needs time to gather her speech however today she was a lot more confused than usual.  Currently she has returned about 50% closer to her normal mental status.  She has had no nausea, vomiting.  She reports loose stool for which she has been taking Mylanta at home.  No current diarrhea today.  No fever.  No dysuria, hematuria, urinary frequency aside from her normal level of frequency with diuretic medication.  She denies any recent medication changes.  No prior hallucinations.     Patient has a full time home nurse who has contacted us.    Past Medical History    Past Medical History:   Diagnosis Date    Anemia     Iron Deficiency anemia    Atrial fibrillation (H)     CAD (coronary artery disease)     non-obstructive    Chronic pain     neck, low back, legs    Congestive heart failure (H)     Degenerative disk disease     Diabetes (H)     Fibromyalgia     Gastro-oesophageal reflux disease     Gout     Hiatal hernia     Mumps     Neuropathy     CRISTIANA (obstructive sleep apnea) - CPAP     Palpitations     Pernicious anemia     Sleep apnea     uses CPAP.    Urinary incontinence     Vitamin D deficiency        Past Surgical History   Past Surgical History:   Procedure Laterality Date    APPENDECTOMY      CHOLECYSTECTOMY      COLONOSCOPY  3/15/2011    COLONOSCOPY N/A 3/15/2023    Procedure: COLONOSCOPY, WITH POLYPECTOMY AND BIOPSY;  Surgeon: Maci Coronel MD;  Location: [facility]    COLONOSCOPY N/A 3/15/2023    Procedure: COLONOSCOPY, FLEXIBLE, WITH LESION REMOVAL USING SNARE;  Surgeon: Maci Coronel MD;  Location: [facility]    CORONARY ANGIOGRAPHY ADULT ORDER      CYSTOSCOPY, BIOPSY BLADDER, COMBINED N/A 7/13/2020    Procedure: CYSTOSCOPY, WITH BLADDER BIOPSY;  Surgeon: Deisy Wilson MD;  Location:  [facility] OR    HEART CATH LEFT HEART CATH  12/30/16    medication management    HYSTERECTOMY TOTAL ABDOMINAL      Knee replacement NOS Left     LAPAROSCOPIC NISSEN FUNDOPLICATION N/A 2/4/2015    Procedure: LAPAROSCOPIC NISSEN FUNDOPLICATION;  Surgeon: Armando Ansari MD;  Location: [facility] OR    TONSILLECTOMY      TRANSPOSITION ULNAR NERVE (ELBOW)         Prior to Admission Medications   Prior to Admission Medications   Prescriptions Last Dose Informant Patient Reported? Taking?   Cholecalciferol (VITAMIN D-3) 125 MCG (5000 UT) TABS   No No   Sig: Take 5,000 Units by mouth daily   DULoxetine (CYMBALTA) 20 MG capsule   No No   Sig: Take 1 capsule (20 mg) by mouth daily   EPINEPHrine (ANY BX GENERIC EQUIV) 0.3 MG/0.3ML injection 2-pack   No No   Sig: INJECT 0.3MLS (0.3MG) INTO THE MUSCLE ONCE AS NEEDED FOR ANAPHYLAXIS   METAMUCIL FIBER PO   Yes No   Sig: Take 2 tablets by mouth daily   Vitamin D3 (CHOLECALCIFEROL) 125 MCG (5000 UT) tablet   Yes No   Sig: Take 125 mcg by mouth daily   acetaminophen (TYLENOL) 325 MG tablet   No No   Sig: Take 3 tablets (975 mg) by mouth every 8 hours as needed for mild pain   blood glucose (NO BRAND SPECIFIED) lancets standard   No No   Sig: Use to test blood sugar  as directed.   blood glucose (NO BRAND SPECIFIED) lancing device   No No   Sig: Device to be used with lancets. Patient requests [employer] 2 lancing device to check blood glucose once per day.   blood glucose (ONETOUCH ULTRA) test strip   No No   Sig: Use to test blood sugar 1 times daily   blood glucose calibration (NO BRAND SPECIFIED) solution   No No   Sig: Use to calibrate blood glucose monitor   blood glucose monitoring (NO BRAND SPECIFIED) meter device kit   No No   Sig: Use to test blood sugar 1 time daily   butalbital-aspirin-caffeine (FIORINAL) -40 MG capsule   No No   Sig: TAKE 1 CAPSULE BY MOUTH EVERY 6 HOURS AS NEEDED FOR HEADACHE   famotidine (PEPCID) 20 MG tablet   Yes No   Sig: Take 20 mg by mouth daily    glipiZIDE (GLUCOTROL XL) 2.5 MG 24 hr tablet   No No   Sig: Take 1 tablet (2.5 mg) by mouth 2 times daily   indapamide (LOZOL) 1.25 MG tablet   No No   Sig: Take 1 tablet (1.25 mg) by mouth every morning   loperamide (IMODIUM A-D) 2 MG tablet   Yes No   Sig: Take 2 mg by mouth 4 times daily as needed for diarrhea   meclizine (ANTIVERT) 12.5 MG tablet   No No   Sig: TAKE 1 TABLET BY MOUTH THREE TIMES DAILY AS NEEDED FOR DIZZINESS   mupirocin (BACTROBAN) 2 % external ointment   No No   Sig: Apply topically 3 times daily   polyethylene glycol (MIRALAX) 17 g packet   No No   Sig: Take 17 g by mouth daily as needed for constipation   senna-docusate (SENOKOT-S/PERICOLACE) 8.6-50 MG tablet   Yes No   Sig: Take 1 tablet by mouth daily as needed for constipation   warfarin ANTICOAGULANT (COUMADIN) 2.5 MG tablet   No No   Sig: Take 1.5 tablet (3.75 mg) by mouth daily except take 1 tablet (2.5 mg) Mon,  or as directed by INR clinic      Facility-Administered Medications: None        Review of Systems    The 10 point Review of Systems is negative other than noted in the HPI or here.     Social History   I have reviewed this patient's social history and updated it with pertinent information if needed. Lives in her apartment, Uses WC, lift chair.   Social History     Tobacco Use    Smoking status: Former     Packs/day: 0.50     Years: 50.00     Pack years: 25.00     Types: Cigarettes     Quit date: 2014     Years since quittin.0     Passive exposure: Past    Smokeless tobacco: Never   Vaping Use    Vaping Use: Never used   Substance Use Topics    Alcohol use: Yes     Comment: couple a month    Drug use: Never        Physical Exam   Vital Signs: Temp: 98.1  F (36.7  C) Temp src: Oral BP: 91/73 Pulse: 63   Resp: 20 SpO2: 94 % O2 Device: None (Room air)    Weight: 236 lbs 15.91 oz    Constitutional: Awake, alert,  no apparent distress.  Eyes: Conjunctiva and pupils examined and normal.  HEENT: Golf ball sized left  posterior scalp hematoma.  Dry mucous membranes, normal dentition.  Respiratory: Clear to auscultation bilaterally, no crackles or wheezing.  Cardiovascular: Regular rate and rhythm, normal S1 and S2, and no murmur noted.  GI: Soft, non-distended, non-tender, bowel sounds present. No rebound tenderness or guarding.  Lymph/Hematologic: No anterior cervical or supraclavicular adenopathy.  Skin: No rashes, no cyanosis, no edema.  Musculoskeletal: No deformities noted.  No erythema or tenderness. Moving all extremities.  Neurologic: A&Ox3. No focal deficits noted. Speech is clear. Moving all extremities in bed.  Psychiatric: No current hallucinations.  Tangential with questioning at times, but eventually answers questions appropriately.       Medical Decision Making       75 MINUTES SPENT BY ME on the date of service doing chart review, history, exam, documentation & further activities per the note.      Data   ------------------------- PAST 24 HR DATA REVIEWED -----------------------------------------------

## 2023-09-22 ENCOUNTER — APPOINTMENT (OUTPATIENT)
Dept: PHYSICAL THERAPY | Facility: CLINIC | Age: 83
End: 2023-09-22
Attending: PHYSICIAN ASSISTANT
Payer: COMMERCIAL

## 2023-09-22 LAB
ANION GAP SERPL CALCULATED.3IONS-SCNC: 10 MMOL/L (ref 7–15)
BUN SERPL-MCNC: 19.8 MG/DL (ref 8–23)
CALCIUM SERPL-MCNC: 9.2 MG/DL (ref 8.8–10.2)
CHLORIDE SERPL-SCNC: 96 MMOL/L (ref 98–107)
CREAT SERPL-MCNC: 0.87 MG/DL (ref 0.51–0.95)
DEPRECATED HCO3 PLAS-SCNC: 28 MMOL/L (ref 22–29)
EGFRCR SERPLBLD CKD-EPI 2021: 67 ML/MIN/1.73M2
GLUCOSE BLDC GLUCOMTR-MCNC: 122 MG/DL (ref 70–99)
GLUCOSE BLDC GLUCOMTR-MCNC: 136 MG/DL (ref 70–99)
GLUCOSE BLDC GLUCOMTR-MCNC: 153 MG/DL (ref 70–99)
GLUCOSE BLDC GLUCOMTR-MCNC: 172 MG/DL (ref 70–99)
GLUCOSE SERPL-MCNC: 144 MG/DL (ref 70–99)
INR PPP: 1.99 (ref 0.85–1.15)
POTASSIUM SERPL-SCNC: 3.9 MMOL/L (ref 3.4–5.3)
SODIUM SERPL-SCNC: 134 MMOL/L (ref 136–145)

## 2023-09-22 PROCEDURE — 80048 BASIC METABOLIC PNL TOTAL CA: CPT | Performed by: PHYSICIAN ASSISTANT

## 2023-09-22 PROCEDURE — 97161 PT EVAL LOW COMPLEX 20 MIN: CPT | Mod: GP | Performed by: PHYSICAL THERAPIST

## 2023-09-22 PROCEDURE — 99232 SBSQ HOSP IP/OBS MODERATE 35: CPT | Performed by: PHYSICIAN ASSISTANT

## 2023-09-22 PROCEDURE — 250N000013 HC RX MED GY IP 250 OP 250 PS 637: Performed by: HOSPITALIST

## 2023-09-22 PROCEDURE — 97530 THERAPEUTIC ACTIVITIES: CPT | Mod: GP | Performed by: PHYSICAL THERAPIST

## 2023-09-22 PROCEDURE — G0378 HOSPITAL OBSERVATION PER HR: HCPCS

## 2023-09-22 PROCEDURE — 82962 GLUCOSE BLOOD TEST: CPT

## 2023-09-22 PROCEDURE — 97116 GAIT TRAINING THERAPY: CPT | Mod: GP | Performed by: PHYSICAL THERAPIST

## 2023-09-22 PROCEDURE — 96361 HYDRATE IV INFUSION ADD-ON: CPT

## 2023-09-22 PROCEDURE — 85610 PROTHROMBIN TIME: CPT | Performed by: PHYSICIAN ASSISTANT

## 2023-09-22 PROCEDURE — 250N000013 HC RX MED GY IP 250 OP 250 PS 637: Performed by: PHYSICIAN ASSISTANT

## 2023-09-22 PROCEDURE — 36415 COLL VENOUS BLD VENIPUNCTURE: CPT | Performed by: PHYSICIAN ASSISTANT

## 2023-09-22 RX ORDER — WARFARIN SODIUM 2.5 MG/1
2.5 TABLET ORAL
Status: COMPLETED | OUTPATIENT
Start: 2023-09-22 | End: 2023-09-22

## 2023-09-22 RX ORDER — HYDRALAZINE HYDROCHLORIDE 20 MG/ML
10 INJECTION INTRAMUSCULAR; INTRAVENOUS EVERY 4 HOURS PRN
Status: DISCONTINUED | OUTPATIENT
Start: 2023-09-22 | End: 2023-09-23 | Stop reason: HOSPADM

## 2023-09-22 RX ADMIN — DULOXETINE HYDROCHLORIDE 20 MG: 20 CAPSULE, DELAYED RELEASE ORAL at 08:54

## 2023-09-22 RX ADMIN — BUTALBITAL, ACETAMINOPHEN AND CAFFEINE 1 TABLET: 50; 325; 40 TABLET ORAL at 04:21

## 2023-09-22 RX ADMIN — GLIPIZIDE 2.5 MG: 2.5 TABLET, FILM COATED, EXTENDED RELEASE ORAL at 18:02

## 2023-09-22 RX ADMIN — INDAPAMIDE 1.25 MG: 1.25 TABLET, FILM COATED ORAL at 08:54

## 2023-09-22 RX ADMIN — ACETAMINOPHEN 650 MG: 325 TABLET, FILM COATED ORAL at 16:40

## 2023-09-22 RX ADMIN — AMOXICILLIN AND CLAVULANATE POTASSIUM 1 TABLET: 875; 125 TABLET, FILM COATED ORAL at 19:59

## 2023-09-22 RX ADMIN — GLIPIZIDE 2.5 MG: 2.5 TABLET, FILM COATED, EXTENDED RELEASE ORAL at 08:54

## 2023-09-22 RX ADMIN — WARFARIN SODIUM 2.5 MG: 2.5 TABLET ORAL at 18:03

## 2023-09-22 RX ADMIN — AMOXICILLIN AND CLAVULANATE POTASSIUM 1 TABLET: 875; 125 TABLET, FILM COATED ORAL at 08:54

## 2023-09-22 ASSESSMENT — ACTIVITIES OF DAILY LIVING (ADL)
ADLS_ACUITY_SCORE: 34
ADLS_ACUITY_SCORE: 34
ADLS_ACUITY_SCORE: 48
ADLS_ACUITY_SCORE: 34
ADLS_ACUITY_SCORE: 34
ADLS_ACUITY_SCORE: 36
DEPENDENT_IADLS:: MEDICATION MANAGEMENT;TRANSPORTATION
ADLS_ACUITY_SCORE: 34

## 2023-09-22 NOTE — PLAN OF CARE
PRIMARY DIAGNOSIS: GENERALIZED WEAKNESS, AMS    OUTPATIENT/OBSERVATION GOALS TO BE MET BEFORE DISCHARGE  1. Orthostatic performed: No    2. Tolerating PO medications: Yes    3. Return to near baseline physical activity: No    4. Cleared for discharge by consultants (if involved): No    Discharge Planner Nurse   Safe discharge environment identified: No  Barriers to discharge: Yes       Entered by: Ludin Aburto RN 09/22/2023 3:19 AM    Vitals are Temp: 97.8  F (36.6  C) Temp src: Oral BP: (!) 154/71 Pulse: 85   Resp: 15 SpO2: 90 %.  Patient is AxOx4, forgetful. Ax2, has not been OOB. Reported dizziness with activity earlier. On a mod carb diet. Reporting headache, declined need for intervention. PIV infusing Lr at 100 ml/hr. Denies SOB and nausea. Purewick in place. Trace edema to BLE with baseline neuropathy. Urine cx pending. Pt declining augmentin for possible uti; was told by her ID doctor not to take abx unless she has a true uti. Pt would like to wait for urine cx result. ANNA Laughlin updated. PT, OT, SW consulted.       Please review provider order for any additional goals.   Nurse to notify provider when observation goals have been met and patient is ready for discharge.

## 2023-09-22 NOTE — PROGRESS NOTES
"   09/22/23 1115   Appointment Info   Signing Clinician's Name / Credentials (PT) Avis Lawrence, PT   Quick Adds   Quick Adds Certification   Living Environment   People in Home alone   Current Living Arrangements apartment   Home Accessibility other (see comments)  (elevator access)   Transportation Anticipated family or friend will provide   Living Environment Comments reports using walker in apt and electric wheelchair out of apt   Self-Care   Usual Activity Tolerance moderate   Current Activity Tolerance poor   Equipment Currently Used at Home walker, rolling;wheelchair, power   Fall history within last six months yes   Number of times patient has fallen within last six months 6  (per patient report)   Activity/Exercise/Self-Care Comment reports being independent with mobility and cares but multiple recent falls   General Information   Onset of Illness/Injury or Date of Surgery 09/21/23   Referring Physician Emily Schwarz PA-C   Patient/Family Therapy Goals Statement (PT) open to rehab   Pertinent History of Current Problem (include personal factors and/or comorbidities that impact the POC) per chart: \"Savanna Rehman is a 82 year old female with Pmhx of a fib anticoagulated on coumadin, HTN, HFpEF, DM type II, GERD, CRISTIANA, who was admitted on 9/21/2023 to observation after presenting for evaluation of altered mental status\"; see medical record for further information   Existing Precautions/Restrictions fall   General Observations patient slow to answer questions; appears confused at times when talking on phone   Cognition   Orientation Status (Cognition) oriented x 3   Cognitive Status Comments slow to answer questions; appears to have some word finding difficulties; difficulty talking on phone at times   Pain Assessment   Patient Currently in Pain No   Integumentary/Edema   Integumentary/Edema Comments see nursing notes; pointed out a bruise on top of head from a fall   Posture    Posture Comments forward " flexed   Range of Motion (ROM)   ROM Comment WFL   Strength (Manual Muscle Testing)   Strength Comments functional weakness   Bed Mobility   Comment, (Bed Mobility) mod A for LE's into bed during sit>supine   Transfers   Comment, (Transfers) mod A for sit>stand   Gait/Stairs (Locomotion)   Distance in Feet 10 feet   Comment, (Gait/Stairs) min A with use of 4WW   Balance   Balance Comments current need for walker and assist   Clinical Impression   Criteria for Skilled Therapeutic Intervention Yes, treatment indicated   PT Diagnosis (PT) impaired functional mobility   Influenced by the following impairments functional weakness; impaired cognition; dizziness; decreased activity tolerance; inability to care for self in current condition   Functional limitations due to impairments impaired independence with functional mobility and cares secondary to above deficits   Clinical Presentation (PT Evaluation Complexity) Evolving/Changing   Clinical Presentation Rationale clinical judgement; level of assist   Clinical Decision Making (Complexity) low complexity   Planned Therapy Interventions (PT) bed mobility training;gait training;balance training;strengthening;transfer training;progressive activity/exercise   Anticipated Equipment Needs at Discharge (PT) walker, rolling;wheelchair   Risk & Benefits of therapy have been explained evaluation/treatment results reviewed;care plan/treatment goals reviewed;risks/benefits reviewed;current/potential barriers reviewed;participants voiced agreement with care plan;participants included;patient   PT Total Evaluation Time   PT Eval, Low Complexity Minutes (00512) 10   Physical Therapy Goals   PT Frequency 5x/week   PT Predicted Duration/Target Date for Goal Attainment 09/26/23   PT Goals Bed Mobility;Transfers;Gait   PT: Bed Mobility Independent;Supine to/from sit;Rolling   PT: Transfers Modified independent;Sit to/from stand;Bed to/from chair;Assistive device   PT: Gait Modified  independent;Rolling walker;100 feet   PT Discharge Planning   PT Discharge Recommendation (DC Rec) Transitional Care Facility   PT Rationale for DC Rec Patient significantly below reported baseline level of function and lives alone; currently needing too much assist to return home; would benefit from ongoing PT in hospital and TCU to maximize return to PLOF; anticipate patient would benefit from a more supportive living environment after TCU stay   PT Brief overview of current status mod A for bed mobility and tx; min A for short distance gait; limited by dizziness     [facility]  OUTPATIENT PHYSICAL THERAPY EVALUATION  PLAN OF TREATMENT FOR OUTPATIENT REHABILITATION  (COMPLETE FOR INITIAL CLAIMS ONLY)  Patient's Last Name, First Name, M.I.  YOB: 1940  Savanna Rehman                        Provider's Name  [facility] Medical Record No.  [id number]                             Onset Date:  09/21/23   Start of Care Date:   9/22/23   Type:     _X_PT   ___OT   ___SLP Medical Diagnosis:        weakness; AMS         PT Diagnosis:  (P) impaired functional mobility Visits from SOC:  1     See note for plan of treatment, functional goals and certification details    I CERTIFY THE NEED FOR THESE SERVICES FURNISHED UNDER        THIS PLAN OF TREATMENT AND WHILE UNDER MY CARE     (Physician co-signature of this document indicates review and certification of the therapy plan).

## 2023-09-22 NOTE — PLAN OF CARE
PRIMARY DIAGNOSIS: GENERALIZED WEAKNESS, AMS    OUTPATIENT/OBSERVATION GOALS TO BE MET BEFORE DISCHARGE  1. Orthostatic performed: No    2. Tolerating PO medications: Yes    3. Return to near baseline physical activity: No    4. Cleared for discharge by consultants (if involved): No    Discharge Planner Nurse   Safe discharge environment identified: No  Barriers to discharge: Yes       Entered by: Ludin Aburto RN 09/22/2023 4:30 AM    Vitals are Temp: 98.2  F (36.8  C) Temp src: Oral BP: (!) 161/82 Pulse: 51   Resp: 14 SpO2: 91 %.  Patient is AxOx4. Ax2, has not been OOB. On a mod carb diet.  at HS. PIV SL. Denies SOB and nausea. Purewick in place. Trace edema to BLE with baseline neuropathy. Urine cx pending. 5/10 headache and neck pain, prn Esgic given. PT, OT, SW consulted.       Please review provider order for any additional goals.   Nurse to notify provider when observation goals have been met and patient is ready for discharge.

## 2023-09-22 NOTE — PLAN OF CARE
"  PRIMARY DIAGNOSIS: Acute Encephalopathy   OUTPATIENT/OBSERVATION GOALS TO BE MET BEFORE DISCHARGE  1. Orthostatic performed: N/A    2. Tolerating PO medications: Yes    3. Return to near baseline physical activity: No    4. Cleared for discharge by consultants (if involved): No    Discharge Planner Nurse   Safe discharge environment identified: Yes  Barriers to discharge: Yes       Entered by: Fadumo Ambrocio RN 09/22/2023   Patient A&O x 3. Up with two assist walker/Gb. Tolerating diet. Reports head& neck pain, pt declines pain medication. Pt ambulated to a chair this morning. PT consult pending. Plan of care ongoing.   BP (!) 148/81 (BP Location: Left arm)   Pulse 87   Temp 98.1  F (36.7  C) (Oral)   Resp 16   Ht 1.727 m (5' 8\")   Wt 107.5 kg (236 lb 15.9 oz)   LMP  (LMP Unknown)   SpO2 90%   BMI 36.03 kg/m      Please review provider order for any additional goals.   Nurse to notify provider when observation goals have been met and patient is ready for discharge.  "

## 2023-09-22 NOTE — PLAN OF CARE
"  PRIMARY DIAGNOSIS: Acute Encephalopathy   OUTPATIENT/OBSERVATION GOALS TO BE MET BEFORE DISCHARGE  1. Orthostatic performed: N/A    2. Tolerating PO medications: Yes    3. Return to near baseline physical activity: No    4. Cleared for discharge by consultants (if involved): No    Discharge Planner Nurse   Safe discharge environment identified: Yes  Barriers to discharge: Yes       Entered by: Fadumo Ambrocio RN 09/22/2023       BP (!) 148/81 (BP Location: Left arm)   Pulse 87   Temp 98.1  F (36.7  C) (Oral)   Resp 16   Ht 1.727 m (5' 8\")   Wt 107.5 kg (236 lb 15.9 oz)   LMP  (LMP Unknown)   SpO2 90%   BMI 36.03 kg/m      Please review provider order for any additional goals.   Nurse to notify provider when observation goals have been met and patient is ready for discharge.  "

## 2023-09-22 NOTE — PLAN OF CARE
PRIMARY DIAGNOSIS: GENERALIZED WEAKNESS, AMS    OUTPATIENT/OBSERVATION GOALS TO BE MET BEFORE DISCHARGE  1. Orthostatic performed: No    2. Tolerating PO medications: Yes    3. Return to near baseline physical activity: No    4. Cleared for discharge by consultants (if involved): No    Discharge Planner Nurse   Safe discharge environment identified: No  Barriers to discharge: Yes       Entered by: Ludin Aburto RN 09/22/2023 3:30 AM    Vitals are Temp: 97.8  F (36.6  C) Temp src: Oral BP: (!) 154/71 Pulse: 85   Resp: 15 SpO2: 90 %.  Patient is AxOx4, forgetful. Ax2, has not been OOB. On a mod carb diet.  at HS. PIV infusing Lr at 100 ml/hr. Denies SOB and nausea. Purewick in place. Trace edema to BLE with baseline neuropathy. Urine cx pending. PT, OT, SW consulted.       Please review provider order for any additional goals.   Nurse to notify provider when observation goals have been met and patient is ready for discharge.

## 2023-09-22 NOTE — PLAN OF CARE
"  PRIMARY DIAGNOSIS: Acute Encephalopathy   OUTPATIENT/OBSERVATION GOALS TO BE MET BEFORE DISCHARGE  1. Orthostatic performed: N/A    2. Tolerating PO medications: Yes    3. Return to near baseline physical activity: No    4. Cleared for discharge by consultants (if involved): No    Discharge Planner Nurse   Safe discharge environment identified: NO  Barriers to discharge: Yes       Entered by: Fadumo Ambrocio RN 09/22/2023   Patient A&O x 3 forgetful. Up with one assist walker/Gb. Tolerating diet. Reports head& neck pain, pt declines pain medication. Pt ambulated to a chair this morning. PT rec TCU, SW sent referrals . Plan of care ongoing.   BP (!) 137/104 (BP Location: Left arm)   Pulse 104   Temp 98.7  F (37.1  C) (Oral)   Resp 16   Ht 1.727 m (5' 8\")   Wt 107.5 kg (236 lb 15.9 oz)   LMP  (LMP Unknown)   SpO2 95%   BMI 36.03 kg/m      Please review provider order for any additional goals.   Nurse to notify provider when observation goals have been met and patient is ready for discharge.  "

## 2023-09-22 NOTE — CONSULTS
Care Management Initial Consult    General Information  Assessment completed with: Patient, Children, daughter, Roya, via phone  Type of CM/SW Visit: Initial Assessment    Primary Care Provider verified and updated as needed: Yes   Readmission within the last 30 days:        Reason for Consult: care coordination/care conference, discharge planning  Advance Care Planning:            Communication Assessment  Patient's communication style: spoken language (English or Bilingual)    Hearing Difficulty or Deaf: no        Cognitive  Cognitive/Neuro/Behavioral: .WDL except, level of consciousness  Level of Consciousness: confused, alert  Arousal Level: opens eyes spontaneously  Orientation: disoriented to, situation  Mood/Behavior: calm  Best Language: 0 - No aphasia  Speech: clear, spontaneous, logical    Living Environment:   People in home: alone     Current living Arrangements: apartment      Able to return to prior arrangements: no       Family/Social Support:  Care provided by: self, homecare agency  Provides care for: no one     Children          Description of Support System: Supportive, Involved         Current Resources:   Patient receiving home care services: Yes  Skilled Home Care Services: Skilled Nursing  Community Resources:    Equipment currently used at home: grab bar, toilet, grab bar, tub/shower, shower chair, walker, rolling  Supplies currently used at home:      Employment/Financial:  Employment Status: retired        Financial Concerns: No concerns identified           Does the patient's insurance plan have a 3 day qualifying hospital stay waiver?  No    Lifestyle & Psychosocial Needs:  Social Determinants of Health     Food Insecurity: No Food Insecurity (1/27/2022)    Hunger Vital Sign     Worried About Running Out of Food in the Last Year: Never true     Ran Out of Food in the Last Year: Never true   Depression: Not at risk (5/12/2023)    PHQ-2     PHQ-2 Score: 0   Recent Concern: Depression -  At risk (3/22/2023)    PHQ-2     PHQ-2 Score: 3   Housing Stability: Low Risk  (1/27/2022)    Housing Stability Vital Sign     Unable to Pay for Housing in the Last Year: No     Number of Places Lived in the Last Year: 1     Unstable Housing in the Last Year: No   Tobacco Use: Medium Risk (9/21/2023)    Patient History     Smoking Tobacco Use: Former     Smokeless Tobacco Use: Never     Passive Exposure: Past   Financial Resource Strain: Medium Risk (1/27/2022)    Overall Financial Resource Strain (CARDIA)     Difficulty of Paying Living Expenses: Somewhat hard   Alcohol Use: Not At Risk (1/27/2022)    AUDIT-C     Frequency of Alcohol Consumption: Monthly or less     Average Number of Drinks: 1 or 2     Frequency of Binge Drinking: Never   Transportation Needs: Unmet Transportation Needs (1/27/2022)    PRAPARE - Transportation     Lack of Transportation (Medical): Yes     Lack of Transportation (Non-Medical): Yes   Physical Activity: Not on file   Interpersonal Safety: Low Risk  (9/21/2023)    Interpersonal Safety     Do you feel physically and emotionally safe where you currently live?: Yes     Within the past 12 months, have you been hit, slapped, kicked or otherwise physically hurt by someone?: No     Within the past 12 months, have you been humiliated or emotionally abused in other ways by your partner or ex-partner?: No   Stress: Stress Concern Present (1/27/2022)    [employer] [place] [employer] - Occupational Stress Questionnaire     Feeling of Stress : Very much   Social Connections: Not on file       Functional Status:  Prior to admission patient needed assistance:   Dependent ADLs:: Ambulation-walker  Dependent IADLs:: Medication Management, Transportation       Mental Health Status:  Mental Health Status: No Current Concerns       Chemical Dependency Status:  Chemical Dependency Status: No Current Concerns             Values/Beliefs:  Spiritual, Cultural Beliefs, Buddhism Practices, Values  that affect care: no               Additional Information:  CM consulted for discharge planning. Patient admitted with acute encephalopathy, resolving. CM met with patient at bedside and spoke with daughter via phone. Patient lives alone in a Senior independent living building. Building has no services. Patient ambulates with walker 4ww and has a scooter. States she has home care RN weekly for medication set up and management.     Therapy recommending TCU at discharge Patient and daughter agreeable to TCU. Patient reports she had recent TCU stay at [facility] in June and would [facility] as 1st choice. . Patient requested [facility] TCU ( 2nd choice, daughter lives in [place]). [facility]. Referrals process discussed and referrals sent. Family will provide transportation.     Patient currently open to [facility]# [phone]. Confirmed HC RN services with HC nurse,Evelin [phone]. [facility] does not have PT/OT services if needed.      CM will continue to follow for discharge planning to TCU.     Mayte Henriquez RN Case Manager  Inpatient Care Coordination   [facility]   [phone]      Mayte Henriquez RN

## 2023-09-22 NOTE — PROGRESS NOTES
[facility]    Medicine Progress Note - Hospitalist Service    Date of Admission:  9/21/2023    Assessment & Plan   Savanna Rehman is a 82 year old female with Pmhx of A-fib anticoagulated on coumadin, HTN, HFpEF, DM type II, GERD, and CRISTIANA who was admitted on 9/21/2023 to observation after presenting for evaluation of altered mental status.      Acute Encephalopathy, improving  Presented for evaluation of altered behavior changes noted AM of 9/21 described as increased from baseline word finding difficulty, slower response time and global weakness.  Also had episode of visual hallucination in the AM seeing her daughter in her apartment.  At this time her symptoms are suspected to be either due to a postconcussive syndrome or developing a UTI versus delirium with possible underlying cognitive impairment issues.  Lower suspicion for dehydration as cause but also considered- her labs look all right although she reports very minimal water intake and previous episodes of loose stool, resolved. Imaging negative for new acute pathology, including MRI of brain, to explain behavior. No noted medication changes associated. Nonseptic appearing.  Of note prior SLUMS score of 26 out of 30 when in TCU after a fall in June.  No formal diagnosis of dementia she has had history of memory issues since she equates she had COVID in July 2022. 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  -treat for possible UTI with abx  -symptoms appear to be almost completely resolved on assessment on 9/22  -PT consulted, saw on 9/22, recommending TCU which patient and daughter are agreeable to   -supportive cares for concussion  -would not recommend starting aspirin at this time, continue PTA warfarin   -Recommend ongoing OT screening, slums etc as outpatient      Possible UTI vs asymptomatic bacteruria   Patient  "is a poor historian but currently denies any dysuria, hematuria.  Reports chronic mild urinary frequency she equates to her medications. No flank or suprapubic pain.     UA via straight cath with trace blood, large LE, pyuria.  Nitrite negative.  Culture was added.   Has history of UTIs, Last urine culture gin feb grew pan susceptible E. Coli, in June multiple morphotypes without predominant organism.  Rocephin ordered but was not given in ED.   -follow ucx, currently growing >100k lactose fermenting gram negative bacilli   -Given she has persistent confusion and abnormal urinalysis with history of UTIs would treat empirically at this point even in the absence of focal UTI symptoms, follow urine culture results.  -Start amoxicillin 5-day course.  Would avoid Bactrim given adverse reaction reported of \"dizziness \" and multiple intolerances noted to medications and allergies.     Fall with closed head injury, scalp hematoma  Occurred on 9/17, no syncope. Imaging neg. has history of falls and chronic gait instability  -supportive cares  -PT consult as above      Mild hyponatremia  -sodium slightly improved to 134  -encourage oral intake  -recheck BMP in AM     Chronic A fib   - anticoagulated on coumadin, resume. INR slightly sub therapeutic at 1.99.  - not on rate controlling medications  - discussed with daughter about considering ongoing use of warfarin if patient has continued falls, plan to discuss further with PCP as outpatient       HTN  HFpEF  LE edema  Appears hypovolemic. Last TTE with EF EF 55-60% on last echo in 2020. Mod tricuspid regurg.    - cont PTA Indapamide     DM type II    - resumed home glipizide    - glucose checks BID and hs    - hypoglycemia protocol     CRISTIANA    - has CPAP but not compliant     Diet: Moderate Consistent Carb (60 g CHO per Meal) Diet    DVT Prophylaxis: Warfarin  Aguilar Catheter: Not present  Lines: None     Cardiac Monitoring: None  Code Status: Full Code      Clinically " "Significant Risk Factors Present on Admission               # Drug Induced Coagulation Defect: home medication list includes an anticoagulant medication         # Obesity: Estimated body mass index is 36.03 kg/m  as calculated from the following:    Height as of this encounter: 1.727 m (5' 8\").    Weight as of this encounter: 107.5 kg (236 lb 15.9 oz).              Disposition Plan      Expected Discharge Date: 09/25/2023      Destination: inpatient rehabilitation facility          The patient's care was discussed with the Attending Physician, Dr. Doshi, Bedside Nurse, Patient, and Patient's Family.    Brianne Esposito PA-C  Hospitalist Service  [facility]  Securely message with [employer] (more info)  Text page via [facility] Paging/Directory   ______________________________________________________________________    Interval History   Patient reports feeling \"happy\" today. It still takes her a little longer to find her words but better today. She states that he family would like her to go to rehab again. Patient reports living alone. Denies dysuria, chest pain, shortness of breath, or confusion. No current dizziness at rest. She has been having a headache located where she hit the back of her head from her recent fall.     Called and updated the patient's daughter Roya with all of her questions answered. Daughter confirms that patient sounds more at her baseline today over the phone.     Physical Exam   Vital Signs: Temp: 98.7  F (37.1  C) Temp src: Oral BP: (!) 137/104 Pulse: 104   Resp: 16 SpO2: 95 % O2 Device: None (Room air)    Weight: 236 lbs 15.91 oz    General Appearance: Sitting up in chair, pleasant, interactive, NAD  Respiratory: CTAB without wheezing   Cardiovascular: RRR without murmur or rub   GI: soft, nondistended, nontender, normoactive bowel sounds   Skin: warm, dry  Other: no current hallucinations, tangential at times, word findings difficulties at times but appears to be her " reported baseline      Medical Decision Making       40 MINUTES SPENT BY ME on the date of service doing chart review, history, exam, documentation & further activities per the note.      Data   ------------------------- PAST 24 HR DATA REVIEWED -----------------------------------------------

## 2023-09-23 ENCOUNTER — TELEPHONE (OUTPATIENT)
Dept: GERIATRICS | Facility: CLINIC | Age: 83
End: 2023-09-23
Payer: COMMERCIAL

## 2023-09-23 VITALS
HEART RATE: 59 BPM | TEMPERATURE: 98.6 F | DIASTOLIC BLOOD PRESSURE: 65 MMHG | HEIGHT: 68 IN | SYSTOLIC BLOOD PRESSURE: 110 MMHG | BODY MASS INDEX: 35.92 KG/M2 | OXYGEN SATURATION: 92 % | RESPIRATION RATE: 18 BRPM | WEIGHT: 236.99 LBS

## 2023-09-23 LAB
ANION GAP SERPL CALCULATED.3IONS-SCNC: 10 MMOL/L (ref 7–15)
BACTERIA UR CULT: ABNORMAL
BUN SERPL-MCNC: 22 MG/DL (ref 8–23)
CALCIUM SERPL-MCNC: 8.9 MG/DL (ref 8.8–10.2)
CHLORIDE SERPL-SCNC: 96 MMOL/L (ref 98–107)
CREAT SERPL-MCNC: 0.87 MG/DL (ref 0.51–0.95)
DEPRECATED HCO3 PLAS-SCNC: 29 MMOL/L (ref 22–29)
EGFRCR SERPLBLD CKD-EPI 2021: 67 ML/MIN/1.73M2
GLUCOSE BLDC GLUCOMTR-MCNC: 145 MG/DL (ref 70–99)
GLUCOSE BLDC GLUCOMTR-MCNC: 161 MG/DL (ref 70–99)
GLUCOSE SERPL-MCNC: 142 MG/DL (ref 70–99)
INR PPP: 2.31 (ref 0.85–1.15)
POTASSIUM SERPL-SCNC: 3.8 MMOL/L (ref 3.4–5.3)
SODIUM SERPL-SCNC: 135 MMOL/L (ref 136–145)

## 2023-09-23 PROCEDURE — 80048 BASIC METABOLIC PNL TOTAL CA: CPT | Performed by: PHYSICIAN ASSISTANT

## 2023-09-23 PROCEDURE — 99239 HOSP IP/OBS DSCHRG MGMT >30: CPT | Performed by: PHYSICIAN ASSISTANT

## 2023-09-23 PROCEDURE — 36415 COLL VENOUS BLD VENIPUNCTURE: CPT | Performed by: PHYSICIAN ASSISTANT

## 2023-09-23 PROCEDURE — 250N000013 HC RX MED GY IP 250 OP 250 PS 637: Performed by: PHYSICIAN ASSISTANT

## 2023-09-23 PROCEDURE — 85610 PROTHROMBIN TIME: CPT | Performed by: PHYSICIAN ASSISTANT

## 2023-09-23 PROCEDURE — G0378 HOSPITAL OBSERVATION PER HR: HCPCS

## 2023-09-23 PROCEDURE — 82962 GLUCOSE BLOOD TEST: CPT

## 2023-09-23 RX ORDER — WARFARIN SODIUM 2.5 MG/1
TABLET ORAL
Qty: 125 TABLET | Refills: 1 | DISCHARGE
Start: 2023-09-23 | End: 2023-09-25

## 2023-09-23 RX ADMIN — GLIPIZIDE 2.5 MG: 2.5 TABLET, FILM COATED, EXTENDED RELEASE ORAL at 07:46

## 2023-09-23 RX ADMIN — INDAPAMIDE 1.25 MG: 1.25 TABLET, FILM COATED ORAL at 07:46

## 2023-09-23 RX ADMIN — DULOXETINE HYDROCHLORIDE 20 MG: 20 CAPSULE, DELAYED RELEASE ORAL at 07:46

## 2023-09-23 RX ADMIN — AMOXICILLIN AND CLAVULANATE POTASSIUM 1 TABLET: 875; 125 TABLET, FILM COATED ORAL at 07:45

## 2023-09-23 ASSESSMENT — ACTIVITIES OF DAILY LIVING (ADL)
ADLS_ACUITY_SCORE: 46
ADLS_ACUITY_SCORE: 48
ADLS_ACUITY_SCORE: 48
ADLS_ACUITY_SCORE: 46

## 2023-09-23 NOTE — PLAN OF CARE
"  PRIMARY DIAGNOSIS: Acute Encephalopathy   OUTPATIENT/OBSERVATION GOALS TO BE MET BEFORE DISCHARGE  1. Orthostatic performed: N/A    2. Tolerating PO medications: Yes    3. Return to near baseline physical activity: No    4. Cleared for discharge by consultants (if involved): No    Discharge Planner Nurse   Safe discharge environment identified: Yes  Barriers to discharge: NO       Entered by: Fadumo Ambrocio RN 09/23/2023   Patient A&O x 4. Up with one assist walker/Gb. Tolerating diet. Pt ambulated to a chair this morning. PT rec TCU, patient is accepted to [facility] TCU. Plan to discharge at 1pm. Patient friend will provide transport.Plan of care ongoing.     /65 (BP Location: Left arm)   Pulse 59   Temp 98.6  F (37  C) (Oral)   Resp 18   Ht 1.727 m (5' 8\")   Wt 107.5 kg (236 lb 15.9 oz)   LMP  (LMP Unknown)   SpO2 92%   BMI 36.03 kg/m      Please review provider order for any additional goals.   Nurse to notify provider when observation goals have been met and patient is ready for discharge.  "

## 2023-09-23 NOTE — PLAN OF CARE
"  PRIMARY DIAGNOSIS: Acute Encephalopathy   OUTPATIENT/OBSERVATION GOALS TO BE MET BEFORE DISCHARGE  1. Orthostatic performed: N/A    2. Tolerating PO medications: Yes    3. Return to near baseline physical activity: No    4. Cleared for discharge by consultants (if involved): No    Discharge Planner Nurse   Safe discharge environment identified: Yes  Barriers to discharge: Yes       Entered by: Fadumo Ambrocio RN 09/23/2023     BP (!) 165/80 (BP Location: Left arm)   Pulse 84   Temp 97.5  F (36.4  C) (Oral)   Resp 18   Ht 1.727 m (5' 8\")   Wt 107.5 kg (236 lb 15.9 oz)   LMP  (LMP Unknown)   SpO2 96%   BMI 36.03 kg/m      Please review provider order for any additional goals.   Nurse to notify provider when observation goals have been met and patient is ready for discharge.  "

## 2023-09-23 NOTE — PLAN OF CARE
Physical Therapy Discharge Summary    Reason for therapy discharge:    Discharged to transitional care facility.    Progress towards therapy goal(s). See goals on Care Plan in [facility] electronic health record for goal details.  Goals not met.  Barriers to achieving goals:   discharge from facility.    Therapy recommendation(s):    Continued therapy is recommended.  Rationale/Recommendations:  Recommend therapy at TCU to progress independence with mobility.

## 2023-09-23 NOTE — PHARMACY-ANTICOAGULATION SERVICE
Clinical Pharmacy- Warfarin Discharge Note  This patient is currently on warfarin for the treatment of Atrial fibrillation.  INR Goal= 2-3  Expected length of therapy lifetime.    Warfarin PTA Regimen: 2.5 mg MonThu, 3.75 mg ROW      Anticoagulation Dose History  More data exists         Latest Ref Rng & Units 8/15/2023 8/22/2023 8/29/2023 9/15/2023 9/21/2023 9/22/2023 9/23/2023   Recent Dosing and Labs   warfarin ANTICOAGULANT (COUMADIN) tablet 2.5 mg - - - - - 2.5 mg, $Given 2.5 mg, $Given -   INR 0.85 - 1.15 3.8     2.4     2.9     2.0     1.92  1.99  2.31       Details          This result is from an external source.               Vitamin K doses administered during the last 7 days: none  FFP administered during the last 7 days: none  Recommend discharging the patient on a warfarin regimen of 2.5 mg daily while on antibiotics.      The patient should have an INR checked in 7 days of completing antibiotics.

## 2023-09-23 NOTE — PLAN OF CARE
"OT: Orders received. Chart reviewed and discussed with care team.  IP OT not indicated as patient will require TCU per PT assessment. Per  PT note \"Patient significantly below reported baseline level of function and lives alone; currently needing too much assist to return home; would benefit from ongoing PT in hospital and TCU to maximize return to PLOF; anticipate patient would benefit from a more supportive living environment after TCU stay.\"Defer discharge recommendations to care team and OT to next level of care.  Will complete orders.    "

## 2023-09-23 NOTE — PLAN OF CARE
PRIMARY DIAGNOSIS:   OUTPATIENT/OBSERVATION GOALS TO BE MET BEFORE DISCHARGE:  ADLs back to baseline: No    Activity and level of assistance: Ax1 w/ GB/Walker    Pain status: Pain free.    Return to near baseline physical activity: No     Discharge Planner Nurse   Safe discharge environment identified: No  Barriers to discharge: Yes       Entered by: Wilma Mcguire RN 09/23/2023         Pt is A&Ox3, disoriented to situation. VSS on RA. Ax2 w/ walker/GB. PIV-SL. Incontinent of bladder. PT recommending TCU.    Please review provider order for any additional goals.   Nurse to notify provider when observation goals have been met and patient is ready for discharge.

## 2023-09-23 NOTE — TELEPHONE ENCOUNTER
Nursing called as Savanna is a new admit today for [facility].    Two concerns that are on her allergy list is Coumadin and ASA.      There are 25 things listed on her allergy list.  Coumadin and ASA state she had swelling of the legs.    Gave the ok for the coumadin as she had it given it before hospital stay for A fib.    The ASA is a combination of medications for headaches.  PRN dosing.      Will pass on to new NP following Savanna.    Deisy Mcdaniel, BATSHEVA CNP

## 2023-09-23 NOTE — PLAN OF CARE
PRIMARY DIAGNOSIS:   OUTPATIENT/OBSERVATION GOALS TO BE MET BEFORE DISCHARGE:  ADLs back to baseline: No    Activity and level of assistance: Ax1 w/ GB/Walker    Pain status: Pain free.    Return to near baseline physical activity: No     Discharge Planner Nurse   Safe discharge environment identified: No  Barriers to discharge: Yes       Entered by: Wilma Mcguire RN 09/22/2023         Pt is A&Ox3, disoriented to situation. VSS on RA.  Ax2 w/ walker/GB. Pt up sitting in chair.     Please review provider order for any additional goals.   Nurse to notify provider when observation goals have been met and patient is ready for discharge.

## 2023-09-23 NOTE — PROGRESS NOTES
Care Management Discharge Note    Discharge Date: 09/25/2023       Discharge Disposition: Transitional Care    Discharge Services: None    Discharge DME:      Discharge Transportation: family or friend will provide    PHandoff Referral Completed: Yes    Additional Information:  Chart reviewed. [facility] has accepted pt and can take pt today.  SW met with pt at bedside and spoke with her dtr Roya and her friend via phone.  Pt does not want to go to TCU, however, family is not able to stay with her. Family/friend was in agreement with TCU and friend will provide transport to [facility].     Plan:  Pt will discharge to [facility] at 1pm.  SW will fax discharge orders to [phone]. [facility]. Charge nurse, bedside nurse, pt/family updated.     PAS completed [id number]    Jimena OROSCO, [facility]  Inpatient Care Coordination   [facility]   [phone]

## 2023-09-24 ENCOUNTER — TELEPHONE (OUTPATIENT)
Dept: GERIATRICS | Facility: CLINIC | Age: 83
End: 2023-09-24
Payer: COMMERCIAL

## 2023-09-24 NOTE — CONFIDENTIAL NOTE
CLIFFORD De La Cruz called to report an INR. Patient admitted yesterday to TCU.    INR 2.4 today  INR 2.3 yesterday     2.5 mg/day is current warfarin dose.   Goal INR 2-3    Lab Results   Component Value Date    INR 2.31 09/23/2023    INR 2.0 09/15/2023    INR 2.1 07/21/2021       Plan:  INR Wednesday    Continue current dose of warfarin 2.5 mg/day    Sophia Beyer NP

## 2023-09-25 ENCOUNTER — DOCUMENTATION ONLY (OUTPATIENT)
Dept: ANTICOAGULATION | Facility: CLINIC | Age: 83
End: 2023-09-25

## 2023-09-25 ENCOUNTER — PATIENT OUTREACH (OUTPATIENT)
Dept: CARE COORDINATION | Facility: CLINIC | Age: 83
End: 2023-09-25

## 2023-09-25 ENCOUNTER — TRANSITIONAL CARE UNIT VISIT (OUTPATIENT)
Dept: GERIATRICS | Facility: CLINIC | Age: 83
End: 2023-09-25
Payer: COMMERCIAL

## 2023-09-25 ENCOUNTER — TELEPHONE (OUTPATIENT)
Dept: INTERNAL MEDICINE | Facility: CLINIC | Age: 83
End: 2023-09-25

## 2023-09-25 VITALS
TEMPERATURE: 97.8 F | HEIGHT: 68 IN | BODY MASS INDEX: 36.25 KG/M2 | HEART RATE: 86 BPM | OXYGEN SATURATION: 94 % | RESPIRATION RATE: 18 BRPM | WEIGHT: 239.2 LBS | SYSTOLIC BLOOD PRESSURE: 141 MMHG | DIASTOLIC BLOOD PRESSURE: 84 MMHG

## 2023-09-25 DIAGNOSIS — R29.6 REPEATED FALLS: Primary | ICD-10-CM

## 2023-09-25 DIAGNOSIS — R42 DIZZINESS AND GIDDINESS: ICD-10-CM

## 2023-09-25 DIAGNOSIS — I48.20 CHRONIC ATRIAL FIBRILLATION (H): ICD-10-CM

## 2023-09-25 DIAGNOSIS — E11.22 TYPE 2 DIABETES MELLITUS WITH STAGE 3A CHRONIC KIDNEY DISEASE, WITHOUT LONG-TERM CURRENT USE OF INSULIN (H): ICD-10-CM

## 2023-09-25 DIAGNOSIS — R53.81 PHYSICAL DECONDITIONING: ICD-10-CM

## 2023-09-25 DIAGNOSIS — I48.21 PERMANENT ATRIAL FIBRILLATION (H): Primary | ICD-10-CM

## 2023-09-25 DIAGNOSIS — N18.31 TYPE 2 DIABETES MELLITUS WITH STAGE 3A CHRONIC KIDNEY DISEASE, WITHOUT LONG-TERM CURRENT USE OF INSULIN (H): ICD-10-CM

## 2023-09-25 DIAGNOSIS — N39.0 RECURRENT UTI: ICD-10-CM

## 2023-09-25 PROCEDURE — 99310 SBSQ NF CARE HIGH MDM 45: CPT | Performed by: NURSE PRACTITIONER

## 2023-09-25 NOTE — LETTER
9/25/2023        RE: Savanna Rehman  [address]  [place] [facility] [address]        [facility]  PRIMARY CARE PROVIDER AND CLINIC:  Patti Suárez MD, [address] / [facility] [address]  Chief Complaint   Patient presents with     [facility] Medical Record Number:  [id number]  Place of Service where encounter took place:  [facility] [[id number]]    Savanna Rehman  is a 82 year old  (1940), admitted to the above facility from  [facility]. Hospital stay 9/21/23 through 9/23/23..  Admitted to this facility for  rehab, medical management, and nursing care.    HPI:    HPI information obtained from: facility chart records, facility staff, and patient report.   Brief Summary of Hospital Course:   Updates on Status Since Skilled nursing Admission:     Patient Savanna Rehman 82 yr old female admitted to [facility] for rehabilitation s/post hospitalization [facility] 9/21-9/23/23, recent fall 9/16/23 with head strike and concern for increased confusion.  Treated for potential urinary tract infection   symptoms suspected due to postconcussive syndrome or developing a UTI versus delirium with possible underlying cognitive impairment issues    Negative imaging for acute event:  Noncontrast head CT showed left posterior scalp soft tissue swelling and hematoma with no skull fractures no acute ICH or other pathology.  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  CT C-spine negative.  MR Brain negative.     History fall June 23 with hematoma and concern for safety at home.  Lives alone in apartment    PMHx tremors, hypertension, DMII, dizziness   Of note allergy listed coumadin and aspirin, however, has been taking at home and tolerating    TODAY  Answered  questions appropriately at visit  Some confusion noted per staff. Reported hearing her mom's voice  Express she dislikes Georgian speaking roommate,  updated  Patient states she slept well, eating fine; inc bowel & bladder at times at baseline  Denies pain, chest pain or shortness of breath   States she can not drink Etoh due to fatty liver        CODE STATUS/ADVANCE DIRECTIVES DISCUSSION:   CPR/Full code   Patient's living condition: lives alone  ALLERGIES: Augmented betamethasone diprop [betamethasone], Petroleum jelly [petrolatum], Shellfish-derived products, Aspirin, Bacitracin, Bactrim [sulfamethoxazole-trimethoprim], Coumadin [warfarin], Darvon [propoxyphene], Dilaudid [hydromorphone], Levaquin [levofloxacin], Lidocaine, Neomycin, Neosporin [neomycin-polymyxin-gramicidin], Nitrofurantoin, Oxycodone, Percodan [oxycodone-aspirin], Polymyxin b, Pramoxine, Tramadol, Vicodin [hydrocodone-acetaminophen], Xarelto [rivaroxaban], Adhesive tape, Codeine, Hydrocortisone, and Other environmental allergy  PAST MEDICAL HISTORY:  has a past medical history of Anemia, Atrial fibrillation (H), CAD (coronary artery disease), Chronic pain, Congestive heart failure (H), Degenerative disk disease, Diabetes (H), Fibromyalgia, Gastro-oesophageal reflux disease, Gout, Hiatal hernia, Mumps, Neuropathy, CRISTIANA (obstructive sleep apnea) - CPAP, Palpitations, Pernicious anemia, Sleep apnea, Urinary incontinence, and Vitamin D deficiency.    She has no past medical history of Asymptomatic human immunodeficiency virus (HIV) infection status (H), Cerebral palsy (H), Congenital renal agenesis and dysgenesis, Goiter, Hernia, abdominal, History of spinal cord injury, History of thrombophlebitis, Horseshoe kidney, Hydrocephalus (H), Malignant hyperthermia, Parkinsons disease (H), Spina bifida (H), STD (sexually transmitted disease), Tethered cord (H), or Tuberculosis.  PAST SURGICAL HISTORY:   has a past surgical history that includes  Cholecystectomy; Hysterectomy total abdominal; appendectomy; colonoscopy (3/15/2011); Laparoscopic nissen fundoplication (N/A, 2/4/2015); Heart Cath Left heart cath (12/30/16); Coronary Angiography Adult Order; Tonsillectomy; Knee replacement NOS (Left); Transposition ulnar nerve (elbow); Cystoscopy, biopsy bladder, combined (N/A, 7/13/2020); Colonoscopy (N/A, 3/15/2023); and Colonoscopy (N/A, 3/15/2023).  FAMILY HISTORY: family history includes Alzheimer Disease in her mother; Lung Cancer in her father; No Known Problems in her brother, brother, and brother.  SOCIAL HISTORY:   reports that she quit smoking about 9 years ago. Her smoking use included cigarettes. She has a 25.00 pack-year smoking history. She has been exposed to tobacco smoke. She has never used smokeless tobacco. She reports current alcohol use. She reports that she does not use drugs.    Post Discharge Medication Reconciliation Status: discharge medications reconciled and changed, per note/orders    Current Outpatient Medications   Medication Sig Dispense Refill     acetaminophen (TYLENOL) 500 MG tablet Take 1,000 mg by mouth every 6 hours as needed for mild pain       amoxicillin-clavulanate (AUGMENTIN) 875-125 MG tablet Take 1 tablet by mouth every 12 hours 7 tablet 0     blood glucose (NO BRAND SPECIFIED) lancets standard Use to test blood sugar  as directed. 1 each 0     blood glucose (NO BRAND SPECIFIED) lancing device Device to be used with lancets. Patient requests Moncaiet 2 lancing device to check blood glucose once per day. 1 each 0     blood glucose (ONETOUCH ULTRA) test strip Use to test blood sugar 1 times daily 100 strip 1     blood glucose calibration (NO BRAND SPECIFIED) solution Use to calibrate blood glucose monitor 1 Bottle 3     blood glucose monitoring (NO BRAND SPECIFIED) meter device kit Use to test blood sugar 1 time daily 1 kit 0     butalbital-aspirin-caffeine (FIORINAL) -40 MG capsule TAKE 1 CAPSULE BY MOUTH  "EVERY 6 HOURS AS NEEDED FOR HEADACHE 15 capsule 0     Cholecalciferol (VITAMIN D-3) 125 MCG (5000 UT) TABS Take 5,000 Units by mouth daily       DULoxetine (CYMBALTA) 20 MG capsule Take 1 capsule (20 mg) by mouth daily 30 capsule 1     EPINEPHrine (ANY BX GENERIC EQUIV) 0.3 MG/0.3ML injection 2-pack INJECT 0.3MLS (0.3MG) INTO THE MUSCLE ONCE AS NEEDED FOR ANAPHYLAXIS 2 each 1     famotidine (PEPCID) 20 MG tablet Take 20 mg by mouth daily       glipiZIDE (GLUCOTROL XL) 2.5 MG 24 hr tablet Take 1 tablet (2.5 mg) by mouth 2 times daily 180 tablet 3     indapamide (LOZOL) 1.25 MG tablet Take 1 tablet (1.25 mg) by mouth every morning 30 tablet 3     loperamide (IMODIUM A-D) 2 MG tablet Take 2 mg by mouth 4 times daily as needed for diarrhea       meclizine (ANTIVERT) 12.5 MG tablet TAKE 1 TABLET BY MOUTH THREE TIMES DAILY AS NEEDED FOR DIZZINESS 30 tablet 0     ondansetron (ZOFRAN) 8 MG tablet Take 8 mg by mouth every 8 hours as needed for nausea       psyllium (METAMUCIL/KONSYL) capsule Take 2 capsules by mouth 2 times daily 0.4 mg capsules       WARFARIN SODIUM PO Per INR orders         ROS:  10 point ROS of systems including Constitutional, Eyes, Respiratory, Cardiovascular, Gastroenterology, Genitourinary, Integumentary, Musculoskeletal, Psychiatric were all negative except for pertinent positives noted in my HPI.    Vitals:  BP (!) 141/84   Pulse 86   Temp 97.8  F (36.6  C)   Resp 18   Ht 1.727 m (5' 8\")   Wt 108.5 kg (239 lb 3.2 oz)   LMP  (LMP Unknown)   SpO2 94%   BMI 36.37 kg/m    Exam:  GENERAL APPEARANCE:  Alert, in no distress  ENT:  Mouth and posterior oropharynx normal, moist mucous membranes  EYES:  EOM, conjunctivae, lids, pupils and irises normal  NECK:  No adenopathy,masses or thyromegaly  RESP:  respiratory effort and palpation of chest normal, lungs clear to auscultation , no respiratory distress  CV:  Palpation and auscultation of heart done , regular rate and rhythm, no murmur, rub, or " gallop  ABDOMEN:  normal bowel sounds, soft, nontender, no hepatosplenomegaly or other masses  M/S:   lying in bed  SKIN:  Inspection of skin and subcutaneous tissue baseline  NEURO:   Cranial nerves 2-12 are normal tested and grossly at patient's baseline  PSYCH:  oriented X 3    Lab/Diagnostic data:  Labs done in SNF are in [place] EPIC. Please refer to them using EPIC/Care Everywhere.    Last Comprehensive Metabolic Panel:  Lab Results   Component Value Date     (L) 09/23/2023    POTASSIUM 3.8 09/23/2023    CHLORIDE 96 (L) 09/23/2023    CO2 29 09/23/2023    ANIONGAP 10 09/23/2023     (H) 09/23/2023    BUN 22.0 09/23/2023    CR 0.87 09/23/2023    GFRESTIMATED 67 09/23/2023    SAUL 8.9 09/23/2023       Lab Results   Component Value Date    WBC 9.9 09/21/2023    WBC 6.1 02/22/2021     Lab Results   Component Value Date    RBC 4.68 09/21/2023    RBC 4.89 02/22/2021     Lab Results   Component Value Date    HGB 13.4 09/21/2023    HGB 14.7 02/22/2021     Lab Results   Component Value Date    HCT 40.4 09/21/2023    HCT 44.5 02/22/2021     No components found for: MCT  Lab Results   Component Value Date    MCV 86 09/21/2023    MCV 91 02/22/2021     Lab Results   Component Value Date    MCH 28.6 09/21/2023    MCH 30.1 02/22/2021     Lab Results   Component Value Date    MCHC 33.2 09/21/2023    MCHC 33.0 02/22/2021     Lab Results   Component Value Date    RDW 16.3 09/21/2023    RDW 14.2 02/22/2021     Lab Results   Component Value Date     09/21/2023     02/22/2021         ASSESSMENT/PLAN:      Acute Encephalopathy, improving  Per hospital note:  altered behavior changes 9/21 described as word finding difficulty, slower response time and global weakness.  Also episode visual hallucination seeing her daughter in her apartment.  LUMS score of 26 out of 30 when in TCU June.  history of memory issues since she equates she had COVID in July 2022. Noted chronic lacunar infarct in the right basal  cannula and diffuse parenchymal volume loss changes consistent with chronic microvascular ischemic disease.  Concern for dementia  At this time her symptoms are suspected to be either due to a postconcussive syndrome or developing a UTI versus delirium with possible underlying cognitive impairment issues'  Alzheimer in mother     Possible UTI vs asymptomatic bacteruria   follow ucx, currently growing >100k lactose fermenting gram negative bacilli   Continue Augmenting  Monitor     Chronic A fib   INR 9/27/23, on coumadin 2.5mg daily  INR 2.4 on 9/24/23  not on rate controlling medications  Hr controlled  Per hospital note; discussed with daughter about considering ongoing use of warfarin if patient has continued falls, plan to discuss further with PCP as outpatient       HTN  HFpEF  LE edema  Appears hypovolemic. Last TTE with EF EF 55-60% on last echo in 2020. Mod tricuspid regurg.  cont Indapamide  Wt Readings from Last 2 Encounters:   09/25/23 108.5 kg (239 lb 3.2 oz)   09/21/23 107.5 kg (236 lb 15.9 oz)     No c/o shortness of breath, not appear fluid up     DM type II  Check blood sugar 2 x daily  Continue glipizide  Monitor      CRISTIANA  has CPAP but not compliant    Mood disorder  Fibromyalgia  Continue Cymbalta  Monitor mood    Dizziness  Continue as needed meclizine    Deconditioned  Comment: d/t recent hospitalization & comorbidity, expect delay rehabilitation d/t age & comorbidity  Plan: PT/OT eval & treat, monitor      Total time spent with patient visit at the skilled nursing facility was 45 min including patient visit and review of past records. Greater than 50% of total time spent with counseling and coordinating care due to discussion on functional goals, pain management, bowel movement management and discharge planning.     Electronically signed by:  BATSHEVA Bryant CNP                       Sincerely,        BATSHEVA Bryant CNP

## 2023-09-25 NOTE — PROGRESS NOTES
[place] [facility]  PRIMARY CARE PROVIDER AND CLINIC:  Patti Suárez MD, [address] / [facility] [address]  Chief Complaint   Patient presents with    [facility]     [place] Medical Record Number:  [id number]  Place of Service where encounter took place:  [facility] ([facility]) [[id number]]    Savanna Rehman  is a 82 year old  (1940), admitted to the above facility from  [facility]. Hospital stay 9/21/23 through 9/23/23..  Admitted to this facility for  rehab, medical management, and nursing care.    HPI:    HPI information obtained from: facility chart records, facility staff, and patient report.   Brief Summary of Hospital Course:   Updates on Status Since Skilled nursing Admission:     Patient Savanna Rehman 82 yr old female admitted to [facility] for rehabilitation s/post hospitalization [facility] 9/21-9/23/23, recent fall 9/16/23 with head strike and concern for increased confusion.  Treated for potential urinary tract infection   symptoms suspected due to postconcussive syndrome or developing a UTI versus delirium with possible underlying cognitive impairment issues    Negative imaging for acute event:  Noncontrast head CT showed left posterior scalp soft tissue swelling and hematoma with no skull fractures no acute ICH or other pathology.  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  CT C-spine negative.  MR Brain negative.     History fall June 23 with hematoma and concern for safety at home.  Lives alone in apartment    PMHx tremors, hypertension, DMII, dizziness   Of note allergy listed coumadin and aspirin, however, has been taking at home and tolerating    TODAY  Answered questions appropriately at visit  Some confusion noted per staff. Reported hearing her mom's voice  Express  she dislikes [place]uran speaking roommate,  updated  Patient states she slept well, eating fine; inc bowel & bladder at times at baseline  Denies pain, chest pain or shortness of breath   States she can not drink Etoh due to fatty liver        CODE STATUS/ADVANCE DIRECTIVES DISCUSSION:   CPR/Full code   Patient's living condition: lives alone  ALLERGIES: Augmented betamethasone diprop [betamethasone], Petroleum jelly [petrolatum], Shellfish-derived products, Aspirin, Bacitracin, Bactrim [sulfamethoxazole-trimethoprim], Coumadin [warfarin], Darvon [propoxyphene], Dilaudid [hydromorphone], Levaquin [levofloxacin], Lidocaine, Neomycin, Neosporin [neomycin-polymyxin-gramicidin], Nitrofurantoin, Oxycodone, Percodan [oxycodone-aspirin], Polymyxin b, Pramoxine, Tramadol, Vicodin [hydrocodone-acetaminophen], Xarelto [rivaroxaban], Adhesive tape, Codeine, Hydrocortisone, and Other environmental allergy  PAST MEDICAL HISTORY:  has a past medical history of Anemia, Atrial fibrillation (H), CAD (coronary artery disease), Chronic pain, Congestive heart failure (H), Degenerative disk disease, Diabetes (H), Fibromyalgia, Gastro-oesophageal reflux disease, Gout, Hiatal hernia, Mumps, Neuropathy, CRISTIANA (obstructive sleep apnea) - CPAP, Palpitations, Pernicious anemia, Sleep apnea, Urinary incontinence, and Vitamin D deficiency.    She has no past medical history of Asymptomatic human immunodeficiency virus (HIV) infection status (H), Cerebral palsy (H), Congenital renal agenesis and dysgenesis, Goiter, Hernia, abdominal, History of spinal cord injury, History of thrombophlebitis, Horseshoe kidney, Hydrocephalus (H), Malignant hyperthermia, Parkinsons disease (H), Spina bifida (H), STD (sexually transmitted disease), Tethered cord (H), or Tuberculosis.  PAST SURGICAL HISTORY:   has a past surgical history that includes Cholecystectomy; Hysterectomy total abdominal; appendectomy; colonoscopy (3/15/2011); Laparoscopic nissen  fundoplication (N/A, 2/4/2015); Heart Cath Left heart cath (12/30/16); Coronary Angiography Adult Order; Tonsillectomy; Knee replacement NOS (Left); Transposition ulnar nerve (elbow); Cystoscopy, biopsy bladder, combined (N/A, 7/13/2020); Colonoscopy (N/A, 3/15/2023); and Colonoscopy (N/A, 3/15/2023).  FAMILY HISTORY: family history includes Alzheimer Disease in her mother; Lung Cancer in her father; No Known Problems in her brother, brother, and brother.  SOCIAL HISTORY:   reports that she quit smoking about 9 years ago. Her smoking use included cigarettes. She has a 25.00 pack-year smoking history. She has been exposed to tobacco smoke. She has never used smokeless tobacco. She reports current alcohol use. She reports that she does not use drugs.    Post Discharge Medication Reconciliation Status: discharge medications reconciled and changed, per note/orders    Current Outpatient Medications   Medication Sig Dispense Refill    acetaminophen (TYLENOL) 500 MG tablet Take 1,000 mg by mouth every 6 hours as needed for mild pain      amoxicillin-clavulanate (AUGMENTIN) 875-125 MG tablet Take 1 tablet by mouth every 12 hours 7 tablet 0    blood glucose (NO BRAND SPECIFIED) lancets standard Use to test blood sugar  as directed. 1 each 0    blood glucose (NO BRAND SPECIFIED) lancing device Device to be used with lancets. Patient requests [employer] 2 lancing device to check blood glucose once per day. 1 each 0    blood glucose (ONETOUCH ULTRA) test strip Use to test blood sugar 1 times daily 100 strip 1    blood glucose calibration (NO BRAND SPECIFIED) solution Use to calibrate blood glucose monitor 1 Bottle 3    blood glucose monitoring (NO BRAND SPECIFIED) meter device kit Use to test blood sugar 1 time daily 1 kit 0    butalbital-aspirin-caffeine (FIORINAL) -40 MG capsule TAKE 1 CAPSULE BY MOUTH EVERY 6 HOURS AS NEEDED FOR HEADACHE 15 capsule 0    Cholecalciferol (VITAMIN D-3) 125 MCG (5000 UT) TABS Take  "5,000 Units by mouth daily      DULoxetine (CYMBALTA) 20 MG capsule Take 1 capsule (20 mg) by mouth daily 30 capsule 1    EPINEPHrine (ANY BX GENERIC EQUIV) 0.3 MG/0.3ML injection 2-pack INJECT 0.3MLS (0.3MG) INTO THE MUSCLE ONCE AS NEEDED FOR ANAPHYLAXIS 2 each 1    famotidine (PEPCID) 20 MG tablet Take 20 mg by mouth daily      glipiZIDE (GLUCOTROL XL) 2.5 MG 24 hr tablet Take 1 tablet (2.5 mg) by mouth 2 times daily 180 tablet 3    indapamide (LOZOL) 1.25 MG tablet Take 1 tablet (1.25 mg) by mouth every morning 30 tablet 3    loperamide (IMODIUM A-D) 2 MG tablet Take 2 mg by mouth 4 times daily as needed for diarrhea      meclizine (ANTIVERT) 12.5 MG tablet TAKE 1 TABLET BY MOUTH THREE TIMES DAILY AS NEEDED FOR DIZZINESS 30 tablet 0    ondansetron (ZOFRAN) 8 MG tablet Take 8 mg by mouth every 8 hours as needed for nausea      psyllium (METAMUCIL/KONSYL) capsule Take 2 capsules by mouth 2 times daily 0.4 mg capsules      WARFARIN SODIUM PO Per INR orders         ROS:  10 point ROS of systems including Constitutional, Eyes, Respiratory, Cardiovascular, Gastroenterology, Genitourinary, Integumentary, Musculoskeletal, Psychiatric were all negative except for pertinent positives noted in my HPI.    Vitals:  BP (!) 141/84   Pulse 86   Temp 97.8  F (36.6  C)   Resp 18   Ht 1.727 m (5' 8\")   Wt 108.5 kg (239 lb 3.2 oz)   LMP  (LMP Unknown)   SpO2 94%   BMI 36.37 kg/m    Exam:  GENERAL APPEARANCE:  Alert, in no distress  ENT:  Mouth and posterior oropharynx normal, moist mucous membranes  EYES:  EOM, conjunctivae, lids, pupils and irises normal  NECK:  No adenopathy,masses or thyromegaly  RESP:  respiratory effort and palpation of chest normal, lungs clear to auscultation , no respiratory distress  CV:  Palpation and auscultation of heart done , regular rate and rhythm, no murmur, rub, or gallop  ABDOMEN:  normal bowel sounds, soft, nontender, no hepatosplenomegaly or other masses  M/S:   lying in bed  SKIN:  " Inspection of skin and subcutaneous tissue baseline  NEURO:   Cranial nerves 2-12 are normal tested and grossly at patient's baseline  PSYCH:  oriented X 3    Lab/Diagnostic data:  Labs done in SNF are in [place] EPIC. Please refer to them using [facility]/Care Everywhere.    Last Comprehensive Metabolic Panel:  Lab Results   Component Value Date     (L) 09/23/2023    POTASSIUM 3.8 09/23/2023    CHLORIDE 96 (L) 09/23/2023    CO2 29 09/23/2023    ANIONGAP 10 09/23/2023     (H) 09/23/2023    BUN 22.0 09/23/2023    CR 0.87 09/23/2023    GFRESTIMATED 67 09/23/2023    SAUL 8.9 09/23/2023       Lab Results   Component Value Date    WBC 9.9 09/21/2023    WBC 6.1 02/22/2021     Lab Results   Component Value Date    RBC 4.68 09/21/2023    RBC 4.89 02/22/2021     Lab Results   Component Value Date    HGB 13.4 09/21/2023    HGB 14.7 02/22/2021     Lab Results   Component Value Date    HCT 40.4 09/21/2023    HCT 44.5 02/22/2021     No components found for: MCT  Lab Results   Component Value Date    MCV 86 09/21/2023    MCV 91 02/22/2021     Lab Results   Component Value Date    MCH 28.6 09/21/2023    MCH 30.1 02/22/2021     Lab Results   Component Value Date    MCHC 33.2 09/21/2023    MCHC 33.0 02/22/2021     Lab Results   Component Value Date    RDW 16.3 09/21/2023    RDW 14.2 02/22/2021     Lab Results   Component Value Date     09/21/2023     02/22/2021         ASSESSMENT/PLAN:      Acute Encephalopathy, improving  Per hospital note:  altered behavior changes 9/21 described as word finding difficulty, slower response time and global weakness.  Also episode visual hallucination seeing her daughter in her apartment.  LUMS score of 26 out of 30 when in TCU June.  history of memory issues since she equates she had COVID in July 2022. 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  Concern for  dementia  At this time her symptoms are suspected to be either due to a postconcussive syndrome or developing a UTI versus delirium with possible underlying cognitive impairment issues'  Alzheimer in mother     Possible UTI vs asymptomatic bacteruria   follow ucx, currently growing >100k lactose fermenting gram negative bacilli   Continue Augmenting  Monitor     Chronic A fib   INR 9/27/23, on coumadin 2.5mg daily  INR 2.4 on 9/24/23  not on rate controlling medications  Hr controlled  Per hospital note; discussed with daughter about considering ongoing use of warfarin if patient has continued falls, plan to discuss further with PCP as outpatient       HTN  HFpEF  LE edema  Appears hypovolemic. Last TTE with EF EF 55-60% on last echo in 2020. Mod tricuspid regurg.  cont Indapamide  Wt Readings from Last 2 Encounters:   09/25/23 108.5 kg (239 lb 3.2 oz)   09/21/23 107.5 kg (236 lb 15.9 oz)     No c/o shortness of breath, not appear fluid up     DM type II  Check blood sugar 2 x daily  Continue glipizide  Monitor      CRISTIANA  has CPAP but not compliant    Mood disorder  Fibromyalgia  Continue Cymbalta  Monitor mood    Dizziness  Continue as needed meclizine    Deconditioned  Comment: d/t recent hospitalization & comorbidity, expect delay rehabilitation d/t age & comorbidity  Plan: PT/OT eval & treat, monitor      Total time spent with patient visit at the skilled nursing facility was 45 min including patient visit and review of past records. Greater than 50% of total time spent with counseling and coordinating care due to discussion on functional goals, pain management, bowel movement management and discharge planning.     Electronically signed by:  BATSHEVA Bryant CNP

## 2023-09-25 NOTE — PROGRESS NOTES
Connected Care Resource Center    Background: Transitional Care Management program identified per system criteria and reviewed by [facility] team for possible outreach.    Assessment: Upon chart review, [facility] Team member will not proceed with patient outreach related to this episode of Transitional Care Management program due to reason below:    Patient has discharged to a Memory Care, Long-term Care, Assisted Living or Group Home where patient is receiving on-site support with their daily cares, including support with hospital follow up plan.    Plan: Transitional Care Management episode addressed appropriately per reason noted above.      Dary Kim MA  Connected Care Resource [place], [facility]    *Connected Care Resource Team does NOT follow patient ongoing. Referrals are identified based on internal discharge reports and the outreach is to ensure patient has an understanding of their discharge instructions.

## 2023-09-25 NOTE — PROGRESS NOTES
ANTICOAGULATION  MANAGEMENT: Discharge Review    Savanna Rehman chart reviewed for anticoagulation continuity of care    Hospital Admission on -23 for acute encephalopathy.    Discharge disposition: TCU  Miguel Marshall  Phone: [phone]    Results:    Recent labs: (last 7 days)     23  1037 23  0553 23  0542   INR 1.92* 1.99* 2.31*     Anticoagulation inpatient management:   -- 2.5 mg      Anticoagulation discharge instructions:     Warfarin dosin.5 mg daily   Bridging: No   INR goal change: No      Medication changes affecting anticoagulation: Yes: Amoxicillin 5 days (-23)    Additional factors affecting anticoagulation: Yes: due to recurrent falls, patient daughter will speak with PCP about discontinuing warfarin at follow up appt     PLAN     No adjustment to anticoagulation plan needed    ACC will resume monitoring upon discharge from TCU    Anticoagulation Calendar updated    Iris Kemp RN

## 2023-09-26 ENCOUNTER — TELEPHONE (OUTPATIENT)
Dept: INTERNAL MEDICINE | Facility: CLINIC | Age: 83
End: 2023-09-26
Payer: COMMERCIAL

## 2023-09-26 ENCOUNTER — LAB REQUISITION (OUTPATIENT)
Dept: LAB | Facility: CLINIC | Age: 83
End: 2023-09-26
Payer: MEDICAID

## 2023-09-26 VITALS
HEART RATE: 78 BPM | HEIGHT: 68 IN | OXYGEN SATURATION: 92 % | TEMPERATURE: 97.7 F | DIASTOLIC BLOOD PRESSURE: 69 MMHG | WEIGHT: 237.8 LBS | RESPIRATION RATE: 18 BRPM | SYSTOLIC BLOOD PRESSURE: 138 MMHG | BODY MASS INDEX: 36.04 KG/M2

## 2023-09-26 DIAGNOSIS — Z11.1 ENCOUNTER FOR SCREENING FOR RESPIRATORY TUBERCULOSIS: ICD-10-CM

## 2023-09-26 DIAGNOSIS — N39.0 URINARY TRACT INFECTION, SITE NOT SPECIFIED: ICD-10-CM

## 2023-09-26 DIAGNOSIS — G43.801 OTHER MIGRAINE WITH STATUS MIGRAINOSUS, NOT INTRACTABLE: ICD-10-CM

## 2023-09-26 RX ORDER — BUTALBITAL, ASPIRIN, AND CAFFEINE 325; 50; 40 MG/1; MG/1; MG/1
CAPSULE ORAL
Qty: 15 CAPSULE | Refills: 0 | Status: CANCELLED | OUTPATIENT
Start: 2023-09-26

## 2023-09-26 NOTE — PROGRESS NOTES
[place] [facility]  [place] Medical Record Number:  [id number]  Place of Service where encounter took place:  [facility] ([facility]) [[id number]]  Chief Complaint   Patient presents with    INR RESULTS    Anticoagulation    Nursing Home Acute       HPI:    Savanna Rehman  is a 82 year old (1940), who is being seen today for an episodic care visit.  HPI information obtained from: facility chart records, facility staff, and patient report. Today's concern is:    Patient Savanna Rehman 82 yr old female admitted to [facility] for rehabilitation s/post hospitalization [facility] 9/21-9/23/23, recent fall 9/16/23 with head strike and concern for increased confusion.  Treated for potential urinary tract infection   symptoms suspected due to postconcussive syndrome or developing a UTI versus delirium with possible underlying cognitive impairment issues    Negative imaging for acute event:  Noncontrast head CT showed left posterior scalp soft tissue swelling and hematoma with no skull fractures no acute ICH or other pathology.  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  CT C-spine negative.  MR Brain negative.     History fall June 23 with hematoma and concern for safety at home.  Lives alone in apartment    PMHx tremors, hypertension, DMII, dizziness   Of note allergy listed coumadin and aspirin, however, has been taking at home and tolerating     Chronic atrial fibrillation (H)  Long term current use of anticoagulant therapy  Physical deconditioning  Type 2 diabetes mellitus with stage 3a chronic kidney disease, without long-term current use of insulin (H)  Diarrhea, unspecified type    INR 2.4 on 9/24/23  Today INR 2.0   On Coumadin 2.5mg daily  No signs and symptoms bleeding  On antibiotic for UTI    Tolerating Augmentin  Reports has ongoing off  and on diarrhea ongoing few years reports due to history of c-diff; denies diagnosis IBS, does have history gallbladder removal. No acute concerns or reports of diarrhea recent  Reports voiding within normal limits, has ongoing incontinence    Participating in therapies   States she plans to eating dining room to get warm food and to make sure she gets what she orders  Reports family bringing in food  Eating meals; denies gastrointestinal upset or nausea    Denies shortness of breath, palpitations or chest pain    Blood sugar managed  09/27/2023 07:08 138 mg/dL  09/26/2023 19:58 222 mg/dL  09/26/2023 07:46 114 mg/dL  09/25/2023 21:53 140 mg/dL    No reports of hallucinations, however, has questionable stories and unclear if accurate; example-talking about her primary care provider is trying to get her to join her Yazidism and go to a [employer]    Past Medical and Surgical History reviewed in Epic today.    MEDICATIONS:    Current Outpatient Medications   Medication Sig Dispense Refill    acetaminophen (TYLENOL) 500 MG tablet Take 1,000 mg by mouth every 6 hours as needed for mild pain      amoxicillin-clavulanate (AUGMENTIN) 875-125 MG tablet Take 1 tablet by mouth every 12 hours 7 tablet 0    blood glucose (NO BRAND SPECIFIED) lancets standard Use to test blood sugar  as directed. 1 each 0    blood glucose (NO BRAND SPECIFIED) lancing device Device to be used with lancets. Patient requests [employer] 2 lancing device to check blood glucose once per day. 1 each 0    blood glucose (ONETOUCH ULTRA) test strip Use to test blood sugar 1 times daily 100 strip 1    blood glucose calibration (NO BRAND SPECIFIED) solution Use to calibrate blood glucose monitor 1 Bottle 3    blood glucose monitoring (NO BRAND SPECIFIED) meter device kit Use to test blood sugar 1 time daily 1 kit 0    butalbital-aspirin-caffeine (FIORINAL) -40 MG capsule TAKE 1 CAPSULE BY MOUTH EVERY 6 HOURS AS NEEDED FOR HEADACHE 15 capsule 0     "Cholecalciferol (VITAMIN D-3) 125 MCG (5000 UT) TABS Take 5,000 Units by mouth daily      DULoxetine (CYMBALTA) 20 MG capsule Take 1 capsule (20 mg) by mouth daily 30 capsule 1    EPINEPHrine (ANY BX GENERIC EQUIV) 0.3 MG/0.3ML injection 2-pack INJECT 0.3MLS (0.3MG) INTO THE MUSCLE ONCE AS NEEDED FOR ANAPHYLAXIS 2 each 1    famotidine (PEPCID) 20 MG tablet Take 20 mg by mouth daily      glipiZIDE (GLUCOTROL XL) 2.5 MG 24 hr tablet Take 1 tablet (2.5 mg) by mouth 2 times daily 180 tablet 3    indapamide (LOZOL) 1.25 MG tablet Take 1 tablet (1.25 mg) by mouth every morning 30 tablet 3    loperamide (IMODIUM A-D) 2 MG tablet Take 2 mg by mouth 4 times daily as needed for diarrhea      meclizine (ANTIVERT) 12.5 MG tablet TAKE 1 TABLET BY MOUTH THREE TIMES DAILY AS NEEDED FOR DIZZINESS 30 tablet 0    ondansetron (ZOFRAN) 8 MG tablet Take 8 mg by mouth every 8 hours as needed for nausea      psyllium (METAMUCIL/KONSYL) capsule Take 2 capsules by mouth 2 times daily 0.4 mg capsules      WARFARIN SODIUM PO Per INR orders           REVIEW OF SYSTEMS:  4 point ROS including Respiratory, CV, GI and , other than that noted in the HPI,  is negative    Objective:  /69   Pulse 78   Temp 97.7  F (36.5  C)   Resp 18   Ht 1.727 m (5' 8\")   Wt 107.9 kg (237 lb 12.8 oz)   LMP  (LMP Unknown)   SpO2 92%   BMI 36.16 kg/m    Exam:  GENERAL APPEARANCE:  Alert, in no distress  RESP:  respiratory effort and palpation of chest normal, lungs clear to auscultation , no respiratory distress  CV:  Palpation and auscultation of heart done , regular rate and rhythm, no murmur, rub, or gallop  ABDOMEN:  normal bowel sounds, soft, nontender  M/S:   sitting on bed  SKIN:  Inspection of skin and subcutaneous tissue baseline  NEURO:   Cranial nerves 2-12 are normal tested and grossly at patient's baseline  PSYCH:  oriented X 3    Labs:   Labs done in SNF are in [place] EPIC. Please refer to them using EPIC/Care " Everywhere.    ASSESSMENT/PLAN:     Chronic atrial fibrillation (H)  Long term current use of anticoagulant therapy  Physical deconditioning  Type 2 diabetes mellitus with stage 3a chronic kidney disease, without long-term current use of insulin (H)  Diarrhea, unspecified type    INR 2.4 on 9/24/23  Today INR 2.0   On Coumadin 2.5mg daily  No signs and symptoms bleeding  On antibiotic for urinary tract infection  Order coumadin 2.5mg daily and INR 9/29/23    Tolerating Augmentin  Reports has ongoing off and on diarrhea ongoing few years reports due to history of c-diff; denies diagnosis IBS, does have history gallbladder removal. No acute concerns or reports of diarrhea recent  Continue on current medications to treat for bowel movement management, monitor   Reports voiding within normal limits, has ongoing incontinence    Participating in therapies   Continue therapies    States she plans to eat dining room to get warm food and to make sure she gets what she orders  Reports family bringing in food  Eating meals; denies gastrointestinal upset or nausea    Denies shortness of breath, palpitations or chest pain    Blood sugar managed  09/27/2023 07:08 138 mg/dL  09/26/2023 19:58 222 mg/dL  09/26/2023 07:46 114 mg/dL  09/25/2023 21:53 140 mg/dL  Eating meals, no low blood sugar  Continue Glipizide    No reports of hallucinations, however, has questionable stories and unclear if accurate; example-talking about her primary care provider is trying to get her to join her Nondenominational and go to a fish beard  Monitor confusion  Pending cognitive testing    Electronically signed by:  BATSHEVA Bryant CNP

## 2023-09-26 NOTE — TELEPHONE ENCOUNTER
Patient Returning Call    Reason for call:  Patient called in and states she would like to be transferred to [facility] for transitional care as she does not like where she is currently staying. Patient had to end call abruptly as she was getting another call.    Information relayed to patient:  message placed    Patient has additional questions:  No      [place] or would you prefer to receive a phone call?:   Patient would prefer a phone call   Okay to leave a detailed message?: Yes at Cell number on file:    Telephone Information:   Mobile [phone]

## 2023-09-27 ENCOUNTER — TRANSITIONAL CARE UNIT VISIT (OUTPATIENT)
Dept: GERIATRICS | Facility: CLINIC | Age: 83
End: 2023-09-27
Payer: COMMERCIAL

## 2023-09-27 DIAGNOSIS — I48.20 CHRONIC ATRIAL FIBRILLATION (H): Primary | ICD-10-CM

## 2023-09-27 DIAGNOSIS — R19.7 DIARRHEA, UNSPECIFIED TYPE: ICD-10-CM

## 2023-09-27 DIAGNOSIS — E11.22 TYPE 2 DIABETES MELLITUS WITH STAGE 3A CHRONIC KIDNEY DISEASE, WITHOUT LONG-TERM CURRENT USE OF INSULIN (H): ICD-10-CM

## 2023-09-27 DIAGNOSIS — R53.81 PHYSICAL DECONDITIONING: ICD-10-CM

## 2023-09-27 DIAGNOSIS — N18.31 TYPE 2 DIABETES MELLITUS WITH STAGE 3A CHRONIC KIDNEY DISEASE, WITHOUT LONG-TERM CURRENT USE OF INSULIN (H): ICD-10-CM

## 2023-09-27 DIAGNOSIS — Z79.01 LONG TERM CURRENT USE OF ANTICOAGULANT THERAPY: ICD-10-CM

## 2023-09-27 LAB
ANION GAP SERPL CALCULATED.3IONS-SCNC: 12 MMOL/L (ref 7–15)
BUN SERPL-MCNC: 22.6 MG/DL (ref 8–23)
CALCIUM SERPL-MCNC: 9.5 MG/DL (ref 8.8–10.2)
CHLORIDE SERPL-SCNC: 97 MMOL/L (ref 98–107)
CREAT SERPL-MCNC: 0.91 MG/DL (ref 0.51–0.95)
DEPRECATED HCO3 PLAS-SCNC: 29 MMOL/L (ref 22–29)
EGFRCR SERPLBLD CKD-EPI 2021: 63 ML/MIN/1.73M2
ERYTHROCYTE [DISTWIDTH] IN BLOOD BY AUTOMATED COUNT: 16.5 % (ref 10–15)
GLUCOSE SERPL-MCNC: 96 MG/DL (ref 70–99)
HCT VFR BLD AUTO: 40 % (ref 35–47)
HGB BLD-MCNC: 12.7 G/DL (ref 11.7–15.7)
MCH RBC QN AUTO: 28 PG (ref 26.5–33)
MCHC RBC AUTO-ENTMCNC: 31.8 G/DL (ref 31.5–36.5)
MCV RBC AUTO: 88 FL (ref 78–100)
PLATELET # BLD AUTO: 228 10E3/UL (ref 150–450)
POTASSIUM SERPL-SCNC: 4.4 MMOL/L (ref 3.4–5.3)
RBC # BLD AUTO: 4.53 10E6/UL (ref 3.8–5.2)
SODIUM SERPL-SCNC: 138 MMOL/L (ref 135–145)
WBC # BLD AUTO: 5.9 10E3/UL (ref 4–11)

## 2023-09-27 PROCEDURE — 36415 COLL VENOUS BLD VENIPUNCTURE: CPT | Mod: ORL | Performed by: NURSE PRACTITIONER

## 2023-09-27 PROCEDURE — 85027 COMPLETE CBC AUTOMATED: CPT | Mod: ORL | Performed by: NURSE PRACTITIONER

## 2023-09-27 PROCEDURE — 80048 BASIC METABOLIC PNL TOTAL CA: CPT | Mod: ORL | Performed by: NURSE PRACTITIONER

## 2023-09-27 PROCEDURE — 86481 TB AG RESPONSE T-CELL SUSP: CPT | Mod: ORL | Performed by: NURSE PRACTITIONER

## 2023-09-27 PROCEDURE — 99309 SBSQ NF CARE MODERATE MDM 30: CPT | Performed by: NURSE PRACTITIONER

## 2023-09-27 PROCEDURE — P9604 ONE-WAY ALLOW PRORATED TRIP: HCPCS | Mod: ORL | Performed by: NURSE PRACTITIONER

## 2023-09-27 NOTE — LETTER
9/27/2023        RE: Savanna Rehman  [address]  [facility] [address]        No notes on file      Sincerely,        BATSHEVA Bryant CNP

## 2023-09-27 NOTE — TELEPHONE ENCOUNTER
I spoke with patient and she said she is talking to the  at [facility] and he is trying to help her but there is no room at [facility] right now.

## 2023-09-28 ENCOUNTER — DOCUMENTATION ONLY (OUTPATIENT)
Dept: OTHER | Facility: CLINIC | Age: 83
End: 2023-09-28
Payer: COMMERCIAL

## 2023-09-28 NOTE — PROGRESS NOTES
"[place] [facility]  [place] Medical Record Number:  [id number]  Place of Service where encounter took place: [facility] ([facility]) [[id number]]    HPI:    Savanna Rehman is a 82 year old  (1940), who is being seen today for an episodic care visit at the above location.   HPI information obtained from: facility chart records, facility staff, and patient report. Today's concern is INR/Coumadin management for A. Fib    ROS/Subjective:  Bleeding Signs/Symptoms:  None  Thromboembolic Signs/Symptoms:  None  Medication Changes:  Yes, on antibiotic (completed yesterday  Dietary Changes:  No  Activity Changes: No  Bacterial/Viral Infection:  Yes, no signs and symptoms urinary tract infection at this time  Missed Coumadin Doses:  None  On ASA: No  Other Concerns:  No    OBJECTIVE:  /65   Pulse 69   Temp 97.3  F (36.3  C)   Resp 18   Ht 1.727 m (5' 8\")   Wt 107.5 kg (237 lb)   LMP  (LMP Unknown)   SpO2 94%   BMI 36.04 kg/m    Last INR: 2.0 on 9/27/23  INR Today:1.8  Current Dose:  coumadin 2.5mg daily x 7 days  Prior to hospitalization taking 1.5 tablet (3.75 mg) by mouth daily except take 1 tablet (2.5 mg) Mon, Th       ASSESSMENT:     Chronic atrial fibrillation (H)  Long term current use of anticoagulant therapy  Supratherapeutic INR for goal of 2-3    PLAN:     New Dose: Coumadin 3mg daily  Next INR: 10/2/23      Electronically signed by:  BATSHEVA Bryant CNP           "

## 2023-09-29 ENCOUNTER — TRANSITIONAL CARE UNIT VISIT (OUTPATIENT)
Dept: GERIATRICS | Facility: CLINIC | Age: 83
End: 2023-09-29
Payer: COMMERCIAL

## 2023-09-29 ENCOUNTER — TELEPHONE (OUTPATIENT)
Dept: INTERNAL MEDICINE | Facility: CLINIC | Age: 83
End: 2023-09-29

## 2023-09-29 VITALS
BODY MASS INDEX: 36.04 KG/M2 | SYSTOLIC BLOOD PRESSURE: 117 MMHG | RESPIRATION RATE: 18 BRPM | TEMPERATURE: 97.5 F | DIASTOLIC BLOOD PRESSURE: 69 MMHG | WEIGHT: 237.8 LBS | HEART RATE: 72 BPM | HEIGHT: 68 IN | OXYGEN SATURATION: 95 %

## 2023-09-29 VITALS
SYSTOLIC BLOOD PRESSURE: 105 MMHG | TEMPERATURE: 97.3 F | OXYGEN SATURATION: 94 % | RESPIRATION RATE: 18 BRPM | BODY MASS INDEX: 35.92 KG/M2 | DIASTOLIC BLOOD PRESSURE: 65 MMHG | HEIGHT: 68 IN | HEART RATE: 69 BPM | WEIGHT: 237 LBS

## 2023-09-29 DIAGNOSIS — Z79.01 LONG TERM CURRENT USE OF ANTICOAGULANT THERAPY: ICD-10-CM

## 2023-09-29 DIAGNOSIS — I48.20 CHRONIC ATRIAL FIBRILLATION (H): Primary | ICD-10-CM

## 2023-09-29 LAB
GAMMA INTERFERON BACKGROUND BLD IA-ACNC: 0.01 IU/ML
M TB IFN-G BLD-IMP: NEGATIVE
M TB IFN-G CD4+ BCKGRND COR BLD-ACNC: 4.09 IU/ML
MITOGEN IGNF BCKGRD COR BLD-ACNC: 0 IU/ML
MITOGEN IGNF BCKGRD COR BLD-ACNC: 0 IU/ML
QUANTIFERON MITOGEN: 4.1 IU/ML
QUANTIFERON NIL TUBE: 0.01 IU/ML
QUANTIFERON TB1 TUBE: 0.01 IU/ML
QUANTIFERON TB2 TUBE: 0.01

## 2023-09-29 PROCEDURE — 99308 SBSQ NF CARE LOW MDM 20: CPT | Performed by: NURSE PRACTITIONER

## 2023-09-29 NOTE — TELEPHONE ENCOUNTER
Fax received from [facility] for review and signature.  Put in Dr. Lauren's in basket.

## 2023-09-29 NOTE — PROGRESS NOTES
[facility]    PRIMARY CARE PROVIDER AND CLINIC:  Patti Suárez MD, [address] / [facility] [address]  Chief Complaint   Patient presents with    Hospital follow up      [place] Medical Record Number:  [id number]  Place of Service where encounter took place:  [facility] (TCU)     Savanna Rehman  is a 82 year old  (1940), admitted to the above facility from  [facility]. Hospital stay 9/21/23 through 9/23/23..       Hospital course was reviewed by me, is as per the hospital discharge summary and NP note    Pt is an  82 yr old female admitted to [facility] for rehabilitation s/post hospitalization at [facility] 9/21-9/23/23, recent fall 9/16/23 with head strike and concern for increased confusion.  Treated for potential urinary tract infection   Symptoms suspected due to postconcussive syndrome or developing a UTI versus delirium with possible underlying cognitive impairment issues  (UMS 26/30) July 2022       Noncontrast head CT showed left posterior scalp soft tissue swelling and hematoma with no skull fractures no acute ICH or other pathology.  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  CT C-spine negative.  MR Brain negative.   UC + for multiple organisms, tx with Augmentin    History of chronic afib on warfarin, UTI. HFpEF, DM 2 on glipizide, CRISTIANA, non-complaint with CPAP, chronic gait instability  Mental status felt to be at baseline at time of discharge      Patient states she is feeling stronger however continues to feel dizzy at times.  She states that this is new since her fall and believes is from a concussion.  She notes chronic headaches for which she takes Fioricet on occasion at home.  She is walking with therapy.  Appetite is good.  She states she feels confused  at times, is unable to tell me if this is baseline for her.    CODE STATUS/ADVANCE DIRECTIVES DISCUSSION:  Full Code  CPR/Full code   ALLERGIES:   Allergies   Allergen Reactions    Augmented Betamethasone Diprop [Betamethasone] Other (See Comments)     Severe yeast infection    Petroleum Jelly [Petrolatum] Anaphylaxis     Rash and swelling    Shellfish-Derived Products Anaphylaxis     Tongue swelling    Aspirin Swelling     tiongue swelling    Bacitracin      Rash swelling    Bactrim [Sulfamethoxazole-Trimethoprim] Dizziness    Coumadin [Warfarin] Swelling     Leg swelling    Darvon [Propoxyphene] Swelling     Throat closes    Dilaudid [Hydromorphone]      No side effects from fentanyl June 2023    Levaquin [Levofloxacin] Swelling     Tongue swelling    Lidocaine     Neomycin Swelling     rash    Neosporin [Neomycin-Polymyxin-Gramicidin] Swelling     rash    Nitrofurantoin      SOB, GI upset,    Oxycodone      Severe itching    Percodan [Oxycodone-Aspirin]      Severe itching    Polymyxin B     Pramoxine     Tramadol     Vicodin [Hydrocodone-Acetaminophen]      Severe itching      Xarelto [Rivaroxaban]     Adhesive Tape Rash     Band aids     Codeine Rash    Hydrocortisone Rash and Swelling    Other Environmental Allergy Rash     Adhesive tape   Band aids       PAST MEDICAL HISTORY:   Past Medical History:   Diagnosis Date    Anemia     Iron Deficiency anemia    Atrial fibrillation (H)     CAD (coronary artery disease)     non-obstructive    Chronic pain     neck, low back, legs    Congestive heart failure (H)     Degenerative disk disease     Diabetes (H)     Fibromyalgia     Gastro-oesophageal reflux disease     Gout     Hiatal hernia     Mumps     Neuropathy     CRISTIANA (obstructive sleep apnea) - CPAP     Palpitations     Pernicious anemia     Sleep apnea     uses CPAP.    Urinary incontinence     Vitamin D deficiency       PAST SURGICAL HISTORY:   has a past surgical history that includes Cholecystectomy;  Hysterectomy total abdominal; appendectomy; colonoscopy (3/15/2011); Laparoscopic nissen fundoplication (N/A, 2/4/2015); Heart Cath Left heart cath (12/30/16); Coronary Angiography Adult Order; Tonsillectomy; Knee replacement NOS (Left); Transposition ulnar nerve (elbow); Cystoscopy, biopsy bladder, combined (N/A, 7/13/2020); Colonoscopy (N/A, 3/15/2023); and Colonoscopy (N/A, 3/15/2023).  FAMILY HISTORY: family history includes Alzheimer Disease in her mother; Lung Cancer in her father; No Known Problems in her brother, brother, and brother.  SOCIAL HISTORY:   reports that she quit smoking about 9 years ago. Her smoking use included cigarettes. She has a 25.00 pack-year smoking history. She has been exposed to tobacco smoke. She has never used smokeless tobacco. She reports current alcohol use. She reports that she does not use drugs.  Patient's living condition: lives alone      Current medications were reviewed by me today      Current Outpatient Medications   Medication Sig    acetaminophen (TYLENOL) 500 MG tablet Take 1,000 mg by mouth every 6 hours as needed for mild pain    amoxicillin-clavulanate (AUGMENTIN) 875-125 MG tablet Take 1 tablet by mouth every 12 hours    blood glucose (NO BRAND SPECIFIED) lancets standard Use to test blood sugar  as directed.    blood glucose (NO BRAND SPECIFIED) lancing device Device to be used with lancets. Patient requests [employer] Repository Systemset 2 lancing device to check blood glucose once per day.    blood glucose (ONETOUCH ULTRA) test strip Use to test blood sugar 1 times daily    blood glucose calibration (NO BRAND SPECIFIED) solution Use to calibrate blood glucose monitor    blood glucose monitoring (NO BRAND SPECIFIED) meter device kit Use to test blood sugar 1 time daily    butalbital-aspirin-caffeine (FIORINAL) -40 MG capsule TAKE 1 CAPSULE BY MOUTH EVERY 6 HOURS AS NEEDED FOR HEADACHE    Cholecalciferol (VITAMIN D-3) 125 MCG (5000 UT) TABS Take 5,000 Units by mouth daily  "   DULoxetine (CYMBALTA) 20 MG capsule Take 1 capsule (20 mg) by mouth daily    EPINEPHrine (ANY BX GENERIC EQUIV) 0.3 MG/0.3ML injection 2-pack INJECT 0.3MLS (0.3MG) INTO THE MUSCLE ONCE AS NEEDED FOR ANAPHYLAXIS    famotidine (PEPCID) 20 MG tablet Take 20 mg by mouth daily    glipiZIDE (GLUCOTROL XL) 2.5 MG 24 hr tablet Take 1 tablet (2.5 mg) by mouth 2 times daily    indapamide (LOZOL) 1.25 MG tablet Take 1 tablet (1.25 mg) by mouth every morning    loperamide (IMODIUM A-D) 2 MG tablet Take 2 mg by mouth 4 times daily as needed for diarrhea    meclizine (ANTIVERT) 12.5 MG tablet TAKE 1 TABLET BY MOUTH THREE TIMES DAILY AS NEEDED FOR DIZZINESS    ondansetron (ZOFRAN) 8 MG tablet Take 8 mg by mouth every 8 hours as needed for nausea    psyllium (METAMUCIL/KONSYL) capsule Take 2 capsules by mouth 2 times daily 0.4 mg capsules    WARFARIN SODIUM PO Per INR orders     No current facility-administered medications for this visit.     Facility-Administered Medications Ordered in Other Visits   Medication    nitroglycerin 100 MCG/ML injection       ROS:  10 point ROS of systems including Constitutional, Eyes, Respiratory, Cardiovascular, Gastroenterology, Genitourinary, Integumentary, Musculoskeletal, Psychiatric were all negative except for pertinent positives noted in my HPI.    Vitals:  /69   Pulse 72   Temp 97.5  F (36.4  C)   Resp 18   Ht 1.727 m (5' 8\")   Wt 107.9 kg (237 lb 12.8 oz)   LMP  (LMP Unknown)   SpO2 95%   BMI 36.16 kg/m    Exam:  Very pleasant, obese female, sitting up in bed.  She appears comfortable.  She is very pleasant, somewhat tangential in her thoughts.  HEENT: Pharynx benign  Neck supple  Lungs clear  CV irregular, heart rate 70s  Abdomen soft, nontender, nondistended  Extremities without edema  Neuro: Alert, pleasant, oriented to person place month and year  Insight fair.  Face symmetric.  No tremors or rigidity  No focal weakness  Gait was not assessed.    Lab/Diagnostic " data:  Most Recent 3 CBC's:  Recent Labs   Lab Test 09/21/23  1005 07/13/23  1854 06/19/23  1144   WBC 9.9 5.2 5.6   HGB 13.4 12.3 10.1*   MCV 86 94 98   * 195 242     Most Recent 3 BMP's:  Recent Labs   Lab Test 09/23/23  0729 09/23/23  0542 09/23/23  0216 09/22/23  0901 09/22/23  0553 09/21/23  1512 09/21/23  1005   NA  --  135*  --   --  134*  --  133*   POTASSIUM  --  3.8  --   --  3.9  --  4.3   CHLORIDE  --  96*  --   --  96*  --  94*   CO2  --  29  --   --  28  --  26   BUN  --  22.0  --   --  19.8  --  15.5   CR  --  0.87  --   --  0.87  --  0.93   ANIONGAP  --  10  --   --  10  --  13   SAUL  --  8.9  --   --  9.2  --  9.3   * 142* 145*   < > 144*   < > 158*    < > = values in this interval not displayed.     Most Recent Hemoglobin A1c:  Recent Labs   Lab Test 05/12/23  1058   A1C 6.1*       ASSESSMENT/PLAN:      Encephalopathy in the setting of recent fall with head trauma, possible UTI, possible underlying cognitive impairment  Patient felt to be at baseline mental status at the time of hospital discharge per discussion with family  Suspect mild underlying cognitive impairment  Patient is experiencing dizziness, headache notes not clear to me if this is different from baseline or if there is some component related to her head injury  Plan: Continue therapies.  Ongoing review with patient's family.  Assure safe discharge    Possible UTI versus asymptomatic bacteriuria  No current  symptoms  Patient has completed course of Augmentin  She has chronic occasional urinary incontinence, denies dysuria  Plan: Monitor vital signs, neurologic status    Chronic atrial fibrillation  Heart rate controlled  On chronic warfarin with concern for risk of bleeding in setting of recent falls  Plan: Monitor INR.  Family and family to review with primary care upon discharge    Hypertension  History congestive heart failure with preserved ejection fraction  Not on diuretic at baseline  CV status appears  stable  Plan: Continue indapamide, monitor BMP, vital signs    Diabetes mellitus type 2  Recent hemoglobin A1c  Plan monitor blood glucoses on glipizide, avoid hypoglycemia        Gael Beebe MD

## 2023-09-30 ENCOUNTER — TRANSITIONAL CARE UNIT VISIT (OUTPATIENT)
Dept: GERIATRICS | Facility: CLINIC | Age: 83
End: 2023-09-30
Payer: COMMERCIAL

## 2023-09-30 DIAGNOSIS — I48.20 CHRONIC ATRIAL FIBRILLATION (H): ICD-10-CM

## 2023-09-30 DIAGNOSIS — N18.31 TYPE 2 DIABETES MELLITUS WITH STAGE 3A CHRONIC KIDNEY DISEASE, WITHOUT LONG-TERM CURRENT USE OF INSULIN (H): ICD-10-CM

## 2023-09-30 DIAGNOSIS — I50.32 HYPERTENSIVE HEART DISEASE WITH CHRONIC DIASTOLIC CONGESTIVE HEART FAILURE (H): ICD-10-CM

## 2023-09-30 DIAGNOSIS — E11.22 TYPE 2 DIABETES MELLITUS WITH STAGE 3A CHRONIC KIDNEY DISEASE, WITHOUT LONG-TERM CURRENT USE OF INSULIN (H): ICD-10-CM

## 2023-09-30 DIAGNOSIS — R29.6 REPEATED FALLS: Primary | ICD-10-CM

## 2023-09-30 DIAGNOSIS — I11.0 HYPERTENSIVE HEART DISEASE WITH CHRONIC DIASTOLIC CONGESTIVE HEART FAILURE (H): ICD-10-CM

## 2023-09-30 PROCEDURE — 99305 1ST NF CARE MODERATE MDM 35: CPT | Performed by: INTERNAL MEDICINE

## 2023-10-02 ENCOUNTER — TELEPHONE (OUTPATIENT)
Dept: INTERNAL MEDICINE | Facility: CLINIC | Age: 83
End: 2023-10-02

## 2023-10-02 ENCOUNTER — TRANSITIONAL CARE UNIT VISIT (OUTPATIENT)
Dept: GERIATRICS | Facility: CLINIC | Age: 83
End: 2023-10-02
Payer: COMMERCIAL

## 2023-10-02 VITALS
BODY MASS INDEX: 36.04 KG/M2 | HEIGHT: 68 IN | HEART RATE: 95 BPM | DIASTOLIC BLOOD PRESSURE: 76 MMHG | SYSTOLIC BLOOD PRESSURE: 141 MMHG | RESPIRATION RATE: 17 BRPM | OXYGEN SATURATION: 96 % | WEIGHT: 237.8 LBS | TEMPERATURE: 97.6 F

## 2023-10-02 DIAGNOSIS — R41.89 COGNITIVE IMPAIRMENT: ICD-10-CM

## 2023-10-02 DIAGNOSIS — I50.32 HYPERTENSIVE HEART DISEASE WITH CHRONIC DIASTOLIC CONGESTIVE HEART FAILURE (H): ICD-10-CM

## 2023-10-02 DIAGNOSIS — N18.31 TYPE 2 DIABETES MELLITUS WITH STAGE 3A CHRONIC KIDNEY DISEASE, WITHOUT LONG-TERM CURRENT USE OF INSULIN (H): ICD-10-CM

## 2023-10-02 DIAGNOSIS — R53.81 PHYSICAL DECONDITIONING: ICD-10-CM

## 2023-10-02 DIAGNOSIS — E11.22 TYPE 2 DIABETES MELLITUS WITH STAGE 3A CHRONIC KIDNEY DISEASE, WITHOUT LONG-TERM CURRENT USE OF INSULIN (H): ICD-10-CM

## 2023-10-02 DIAGNOSIS — I48.20 CHRONIC ATRIAL FIBRILLATION (H): Primary | ICD-10-CM

## 2023-10-02 DIAGNOSIS — Z79.01 LONG TERM CURRENT USE OF ANTICOAGULANT THERAPY: ICD-10-CM

## 2023-10-02 DIAGNOSIS — I11.0 HYPERTENSIVE HEART DISEASE WITH CHRONIC DIASTOLIC CONGESTIVE HEART FAILURE (H): ICD-10-CM

## 2023-10-02 PROCEDURE — 99310 SBSQ NF CARE HIGH MDM 45: CPT | Performed by: NURSE PRACTITIONER

## 2023-10-02 NOTE — PROGRESS NOTES
[facility]  [place] Medical Record Number:  [id number]  Place of Service where encounter took place:  [facility] (TCU) [[id number]]  Chief Complaint   Patient presents with    Nursing Home Acute       HPI:    Savanna Rehman  is a 82 year old (1940), who is being seen today for an episodic care visit.  HPI information obtained from: facility chart records, facility staff, and patient report. Today's concern is:    Patient Savanna Rehman 82 yr old female admitted to [facility] for rehabilitation s/post hospitalization [facility] 9/21-9/23/23, recent fall 9/16/23 with head strike and concern for increased confusion.  Treated for potential urinary tract infection   symptoms suspected due to postconcussive syndrome or developing a UTI versus delirium with possible underlying cognitive impairment issues    Negative imaging for acute event:  Noncontrast head CT showed left posterior scalp soft tissue swelling and hematoma with no skull fractures no acute ICH or other pathology.  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  CT C-spine negative.  MR Brain negative.     History fall June 23 with hematoma and concern for safety at home.  Lives alone in apartment    PMHx tremors, hypertension, Afib, DMII, dizziness   Of note allergy listed coumadin and aspirin, however, has been taking at home and tolerating       Chronic atrial fibrillation (H)  Type 2 diabetes mellitus with stage 3a chronic kidney disease, without long-term current use of insulin (H)  Hypertensive heart disease with chronic diastolic congestive heart failure (H)  Physical deconditioning  Long term current use of anticoagulant therapy  Cognitive impairment    Previously lived home alone in [facility], no steps to enter, Walked herself to the therapy gym yesterday, SLUMS  13/30  Recommendation 24hr supervision, per consult with  the family noticed decline in mental status gradual over time and had been looking into RUFUS  Patient eager to go home. Patient has care conference this week. Family unable to provide 24hr care  Patient has spouse that does not live with her. Reports her spouse is caring for his ill brother.    Vitals and blood sugar stable  Patient decline assessment and states she is fine and just needs to speak to  regarding discharge planning  Did consult with  today regarding discharge planning    INR 1.7  INR 1.8 on 9/29/23  No signs and symptoms bleding  On coumadin 3mg daily x3 days, prior had been coumadin 2.5mg daily x 7 days  Prior to hospitalization taking 1.5 tablet (3.75 mg) by mouth daily except take 1 tablet (2.5 mg) Mon, Th     Past Medical and Surgical History reviewed in Epic today.    MEDICATIONS:    Current Outpatient Medications   Medication Sig Dispense Refill    acetaminophen (TYLENOL) 500 MG tablet Take 1,000 mg by mouth every 6 hours as needed for mild pain      amoxicillin-clavulanate (AUGMENTIN) 875-125 MG tablet Take 1 tablet by mouth every 12 hours 7 tablet 0    blood glucose (NO BRAND SPECIFIED) lancets standard Use to test blood sugar  as directed. 1 each 0    blood glucose (NO BRAND SPECIFIED) lancing device Device to be used with lancets. Patient requests [employer] 2 lancing device to check blood glucose once per day. 1 each 0    blood glucose (ONETOUCH ULTRA) test strip Use to test blood sugar 1 times daily 100 strip 1    blood glucose calibration (NO BRAND SPECIFIED) solution Use to calibrate blood glucose monitor 1 Bottle 3    blood glucose monitoring (NO BRAND SPECIFIED) meter device kit Use to test blood sugar 1 time daily 1 kit 0    butalbital-aspirin-caffeine (FIORINAL) -40 MG capsule TAKE 1 CAPSULE BY MOUTH EVERY 6 HOURS AS NEEDED FOR HEADACHE 15 capsule 0    Cholecalciferol  "(VITAMIN D-3) 125 MCG (5000 UT) TABS Take 5,000 Units by mouth daily      DULoxetine (CYMBALTA) 20 MG capsule Take 1 capsule (20 mg) by mouth daily 30 capsule 1    EPINEPHrine (ANY BX GENERIC EQUIV) 0.3 MG/0.3ML injection 2-pack INJECT 0.3MLS (0.3MG) INTO THE MUSCLE ONCE AS NEEDED FOR ANAPHYLAXIS 2 each 1    famotidine (PEPCID) 20 MG tablet Take 20 mg by mouth daily      glipiZIDE (GLUCOTROL XL) 2.5 MG 24 hr tablet Take 1 tablet (2.5 mg) by mouth 2 times daily 180 tablet 3    indapamide (LOZOL) 1.25 MG tablet Take 1 tablet (1.25 mg) by mouth every morning 30 tablet 3    loperamide (IMODIUM A-D) 2 MG tablet Take 2 mg by mouth 4 times daily as needed for diarrhea      meclizine (ANTIVERT) 12.5 MG tablet TAKE 1 TABLET BY MOUTH THREE TIMES DAILY AS NEEDED FOR DIZZINESS 30 tablet 0    ondansetron (ZOFRAN) 8 MG tablet Take 8 mg by mouth every 8 hours as needed for nausea      psyllium (METAMUCIL/KONSYL) capsule Take 2 capsules by mouth 2 times daily 0.4 mg capsules      WARFARIN SODIUM PO Per INR orders           REVIEW OF SYSTEMS:  4 point ROS including Respiratory, CV, GI and , other than that noted in the HPI,  is negative    Objective:  BP (!) 141/76   Pulse 95   Temp 97.6  F (36.4  C)   Resp 17   Ht 1.727 m (5' 8\")   Wt 107.9 kg (237 lb 12.8 oz)   LMP  (LMP Unknown)   SpO2 96%   BMI 36.16 kg/m      Blood sugar  10/02/2023 08:08 145 mg/dL  10/01/2023 19:06 198 mg/dL  10/01/2023 07:57 140 mg/dL  09/30/2023 20:24 147 mg/dL  09/30/2023 07:14 138 mg/dL  09/29/2023 20:31 118 mg/dL  09/29/2023 07:04 127 mg/dL  09/28/2023 20:28 176 mg/dL  09/28/2023 08:28 128 mg/dL  09/27/2023 22:22 145 mg/dL  09/27/2023 07:08 138 mg/dL    Exam:  GENERAL APPEARANCE:  Alert  RESP:  no respiratory distress  M/S:   lying in bed  SKIN:  Inspection of skin and subcutaneous tissue baseline  NEURO:   Cranial nerves 2-12 are normal tested and grossly at patient's baseline  PSYCH:  memory impaired , affect and mood normal, upset wants to " leave today and has not been able to reach  today    Labs:   Labs done in SNF are in [place] EPIC. Please refer to them using [employer] Everywhere.  Last Comprehensive Metabolic Panel:  Lab Results   Component Value Date     (L) 09/23/2023    POTASSIUM 3.8 09/23/2023    CHLORIDE 96 (L) 09/23/2023    CO2 29 09/23/2023    ANIONGAP 10 09/23/2023     (H) 09/23/2023    BUN 22.0 09/23/2023    CR 0.87 09/23/2023    GFRESTIMATED 67 09/23/2023    SAUL 8.9 09/23/2023       Lab Results   Component Value Date    WBC 9.9 09/21/2023    WBC 6.1 02/22/2021     Lab Results   Component Value Date    RBC 4.68 09/21/2023    RBC 4.89 02/22/2021     Lab Results   Component Value Date    HGB 13.4 09/21/2023    HGB 14.7 02/22/2021     Lab Results   Component Value Date    HCT 40.4 09/21/2023    HCT 44.5 02/22/2021     No components found for: MCT  Lab Results   Component Value Date    MCV 86 09/21/2023    MCV 91 02/22/2021     Lab Results   Component Value Date    MCH 28.6 09/21/2023    MCH 30.1 02/22/2021     Lab Results   Component Value Date    MCHC 33.2 09/21/2023    MCHC 33.0 02/22/2021     Lab Results   Component Value Date    RDW 16.3 09/21/2023    RDW 14.2 02/22/2021     Lab Results   Component Value Date     09/21/2023     02/22/2021         ASSESSMENT/PLAN:     Chronic atrial fibrillation (H)  Type 2 diabetes mellitus with stage 3a chronic kidney disease, without long-term current use of insulin (H)  Hypertensive heart disease with chronic diastolic congestive heart failure (H)  Physical deconditioning  Long term current use of anticoagulant therapy  Cognitive impairment    Previously lived home alone in [place], no steps to enter, Walked herself to the therapy gym yesterday, SLUMS 13/30  Recommendation 24hr supervision, per consult with  the family noticed decline in mental status gradual over time and had been looking into correction  Patient eager to go home. Patient has care  conference this week. Family unable to provide 24hr care  Patient has spouse that does not live with her. Reports her spouse is caring for his ill brother.    Vitals and blood sugar stable  Patient decline assessment and states she is fine and just needs to speak to  regarding discharge planning  Did consult with  today regarding discharge planning    Blood sugar managed     INR 1.7  INR 1.8 on 9/29/23  No signs and symptoms bleding  On coumadin 3mg daily x3 days, prior had been coumadin 2.5mg daily x 7 days  Prior to hospitalization taking 1.5 tablet (3.75 mg) by mouth daily except take 1 tablet (2.5 mg) Mon, Th   Order coumadin 3.5mg daily  INR 10/4/23      Electronically signed by:  BATSHEVA Bryant CNP

## 2023-10-02 NOTE — TELEPHONE ENCOUNTER
Patient calling today from TCU expressing that she feels like she wants to leave the TCU due to a family member being terminally ill and wanting to say goodbye. She states that she is unhappy with the PT/OT because they want her to step on a wooden box and she can't because she's been told it's unsafe to do so.     Explained to patient that her PT/OT providers are there to help her get stronger and that she should bring any of her concerns forward to her care team at the TCU. Patient verbalized understanding that she likely will not be leaving the TCU today. Patient wanted a message sent to Dr. Lauren anyway to express her concerns about not being able to leave the TCU to say goodbye to her family member.     If deemed necessary, would an RN be able to reach out to the patient and try to ease some of her worries/concerns?

## 2023-10-03 ENCOUNTER — TELEPHONE (OUTPATIENT)
Dept: INTERNAL MEDICINE | Facility: CLINIC | Age: 83
End: 2023-10-03
Payer: COMMERCIAL

## 2023-10-03 VITALS
BODY MASS INDEX: 35.92 KG/M2 | RESPIRATION RATE: 18 BRPM | WEIGHT: 237 LBS | TEMPERATURE: 97.6 F | HEIGHT: 68 IN | OXYGEN SATURATION: 95 % | DIASTOLIC BLOOD PRESSURE: 67 MMHG | SYSTOLIC BLOOD PRESSURE: 145 MMHG | HEART RATE: 64 BPM

## 2023-10-03 NOTE — TELEPHONE ENCOUNTER
Report of patient going to hospital and then TCU received via fax. Form in your mailbox to be signed.

## 2023-10-03 NOTE — PROGRESS NOTES
"[facility]  [place] Medical Record Number:  [id number]  Place of Service where encounter took place: [facility] ([facility]) [[id number]]    HPI:    Savanna Rehman is a 82 year old  (1940), who is being seen today for an episodic care visit at the above location. Today's concern is INR/Coumadin management for A. Fib    ROS/Subjective:  Bleeding Signs/Symptoms:  None  Thromboembolic Signs/Symptoms:  None  Medication Changes:  No  Dietary Changes:  No  Activity Changes: No  Bacterial/Viral Infection:  No  Missed Coumadin Doses:  None  On ASA: No  Other Concerns:  No    OBJECTIVE:  BP (!) 145/67   Pulse 64   Temp 97.6  F (36.4  C)   Resp 18   Ht 1.727 m (5' 8\")   Wt 107.5 kg (237 lb)   LMP  (LMP Unknown)   SpO2 95%   BMI 36.04 kg/m    Last INR: 1.7 on 10/2/23  INR Today:  1.7  Current Dose:  Coumadin 3.5mg PO daily      ASSESSMENT:     Chronic atrial fibrillation (H)  Long term current use of anticoagulant therapy  Encounter for therapeutic drug monitoring  Subtherapeutic INR for goal of 2-3    PLAN/ORDERS:     New Dose: Coumadin 4mg daily   Next INR: 10/6/23      Electronically signed by:  BATSHEVA Bryant CNP     "

## 2023-10-04 ENCOUNTER — MEDICAL CORRESPONDENCE (OUTPATIENT)
Dept: HEALTH INFORMATION MANAGEMENT | Facility: CLINIC | Age: 83
End: 2023-10-04

## 2023-10-04 ENCOUNTER — TRANSITIONAL CARE UNIT VISIT (OUTPATIENT)
Dept: GERIATRICS | Facility: CLINIC | Age: 83
End: 2023-10-04
Payer: COMMERCIAL

## 2023-10-04 DIAGNOSIS — Z79.01 LONG TERM CURRENT USE OF ANTICOAGULANT THERAPY: ICD-10-CM

## 2023-10-04 DIAGNOSIS — I48.20 CHRONIC ATRIAL FIBRILLATION (H): Primary | ICD-10-CM

## 2023-10-04 DIAGNOSIS — Z51.81 ENCOUNTER FOR THERAPEUTIC DRUG MONITORING: ICD-10-CM

## 2023-10-04 PROCEDURE — 99308 SBSQ NF CARE LOW MDM 20: CPT | Performed by: NURSE PRACTITIONER

## 2023-10-06 ENCOUNTER — TRANSITIONAL CARE UNIT VISIT (OUTPATIENT)
Dept: GERIATRICS | Facility: CLINIC | Age: 83
End: 2023-10-06
Payer: COMMERCIAL

## 2023-10-06 ENCOUNTER — NURSE TRIAGE (OUTPATIENT)
Dept: NURSING | Facility: CLINIC | Age: 83
End: 2023-10-06

## 2023-10-06 VITALS
SYSTOLIC BLOOD PRESSURE: 150 MMHG | WEIGHT: 237.8 LBS | RESPIRATION RATE: 18 BRPM | TEMPERATURE: 98 F | HEIGHT: 68 IN | BODY MASS INDEX: 36.04 KG/M2 | OXYGEN SATURATION: 92 % | HEART RATE: 81 BPM | DIASTOLIC BLOOD PRESSURE: 79 MMHG

## 2023-10-06 DIAGNOSIS — Z51.81 ENCOUNTER FOR THERAPEUTIC DRUG MONITORING: ICD-10-CM

## 2023-10-06 DIAGNOSIS — Z79.01 LONG TERM CURRENT USE OF ANTICOAGULANT THERAPY: ICD-10-CM

## 2023-10-06 DIAGNOSIS — I48.20 CHRONIC ATRIAL FIBRILLATION (H): Primary | ICD-10-CM

## 2023-10-06 LAB — INR (EXTERNAL): 1.7 (ref 0.9–1.1)

## 2023-10-06 PROCEDURE — 99308 SBSQ NF CARE LOW MDM 20: CPT | Performed by: NURSE PRACTITIONER

## 2023-10-06 NOTE — PROGRESS NOTES
"[facility]AT[facility]  [place] Medical Record Number:  [id number]  Place of Service where encounter took place: [facility] ([facility]) [[id number]]    HPI:    Savanna Rehman is a 82 year old  (1940), who is being seen today for an episodic care visit at the above location. Today's concern is INR/Coumadin management for A. Fib    ROS/Subjective:  Bleeding Signs/Symptoms:  None  Thromboembolic Signs/Symptoms:  None  Medication Changes:  No  Dietary Changes:  No  Activity Changes: No  Bacterial/Viral Infection:  No  Missed Coumadin Doses:  None  On ASA: No  Other Concerns:  No    OBJECTIVE:  BP (!) 150/79   Pulse 81   Temp 98  F (36.7  C)   Resp 18   Ht 1.727 m (5' 8\")   Wt 107.9 kg (237 lb 12.8 oz)   LMP  (LMP Unknown)   SpO2 92%   BMI 36.16 kg/m    Last INR: 1.7 on 10/4/23  INR Today:  1.7  Current Dose:  Coumadin 4mg daily       ASSESSMENT:     Chronic atrial fibrillation (H)  Long term current use of anticoagulant therapy  Encounter for therapeutic drug monitoring  Subtherapeutic INR for goal of 2-3    PLAN/ORDERS:     New Dose: Coumadin 5mg 10/6/23 then Coumadin 4.5mg daily    Next INR: 10/9/23      Electronically signed by:  BATSHEVA Bryant CNP     "

## 2023-10-07 ENCOUNTER — NURSE TRIAGE (OUTPATIENT)
Dept: NURSING | Facility: CLINIC | Age: 83
End: 2023-10-07

## 2023-10-07 NOTE — TELEPHONE ENCOUNTER
Pt is phoning stating that she was seen recently and screened for a lift chair - pt states that she was told by a provider that she qualified and pt shared that she has fallen 6 times in the past recently     Pt states that she is currently at [facility]     Pt had a care giver enter the room - writer spoke with caregiver to let him know what pt was calling for.     Pt got back on the phone and then said she had to go and disconnected phone call.    Writer called pt back at number listed and pt answered - pt is having some confusion at times during conversation. She asked if I knew her and said some things that were random about family members that were not a part of the conversation. Pt is not happy where she is currently residing and states that her family is aware. Pt thanked writer for talking with her and was encouraged by writer to reach out to her family now and her provider on Monday when the clinic is open     Pt never really stated what the intent of her call is and was told to please call back if she should need anything    No triage     Care advice given per protocol and when to call back. Pt verbalized understanding and agrees to plan of care.    Yumi Beckford RN  [facility] Nurse Advisor  10:38 AM 10/7/2023            Reason for Disposition   Health Information question, no triage required and triager able to answer question    Additional Information   Negative: [1] Caller is not with the adult (patient) AND [2] reporting urgent symptoms   Negative: Lab result questions   Negative: Medication questions   Negative: Caller can't be reached by phone   Negative: Caller has already spoken to PCP or another triager   Negative: RN needs further essential information from caller in order to complete triage   Negative: Requesting regular office appointment   Negative: [1] Caller requesting NON-URGENT health information AND [2] PCP's office is the best resource    Protocols used: Information  Only Call - No Triage-A-AH

## 2023-10-07 NOTE — TELEPHONE ENCOUNTER
Patient called.  She is in a TCU.    She states she was told she doesn't take Cymbalta by the nursing facility eartlier today and then tonight she is getting it.  Some confusion with the question.  Patient had me speak to the nurse who is caring for her.    Nurse said there was some confusion with the person bring around the med cart today.  They are aware the patient is taking Cymbalta and they will dispense to her at bedtime as she requests.    No further questions regarding the medication.    The patient requests a note to be sent to PCP so she is aware she is at a TCU.    Will route message to PCP.    Roya Marie RN   10/06/23 9:01 PM  [facility] Nurse Advisor  Reason for Disposition   General information question, no triage required and triager able to answer question    Additional Information   Negative: [1] Caller is not with the adult (patient) AND [2] reporting urgent symptoms   Negative: Lab result questions   Negative: Medication questions   Negative: Caller can't be reached by phone   Negative: Caller has already spoken to PCP or another triager   Negative: RN needs further essential information from caller in order to complete triage   Negative: Requesting regular office appointment   Negative: [1] Caller requesting NON-URGENT health information AND [2] PCP's office is the best resource   Negative: Health Information question, no triage required and triager able to answer question    Protocols used: Information Only Call - No T[facility]

## 2023-10-09 ENCOUNTER — NURSE TRIAGE (OUTPATIENT)
Dept: NURSING | Facility: CLINIC | Age: 83
End: 2023-10-09

## 2023-10-09 ENCOUNTER — TRANSITIONAL CARE UNIT VISIT (OUTPATIENT)
Dept: GERIATRICS | Facility: CLINIC | Age: 83
End: 2023-10-09
Payer: COMMERCIAL

## 2023-10-09 ENCOUNTER — TELEPHONE (OUTPATIENT)
Dept: INTERNAL MEDICINE | Facility: CLINIC | Age: 83
End: 2023-10-09

## 2023-10-09 VITALS
OXYGEN SATURATION: 92 % | HEART RATE: 64 BPM | WEIGHT: 237.8 LBS | TEMPERATURE: 98 F | DIASTOLIC BLOOD PRESSURE: 72 MMHG | HEIGHT: 68 IN | RESPIRATION RATE: 18 BRPM | SYSTOLIC BLOOD PRESSURE: 120 MMHG | BODY MASS INDEX: 36.04 KG/M2

## 2023-10-09 DIAGNOSIS — I48.20 CHRONIC ATRIAL FIBRILLATION (H): Primary | ICD-10-CM

## 2023-10-09 DIAGNOSIS — Z79.01 LONG TERM CURRENT USE OF ANTICOAGULANT THERAPY: ICD-10-CM

## 2023-10-09 DIAGNOSIS — R53.81 PHYSICAL DECONDITIONING: ICD-10-CM

## 2023-10-09 DIAGNOSIS — G43.801 OTHER MIGRAINE WITH STATUS MIGRAINOSUS, NOT INTRACTABLE: ICD-10-CM

## 2023-10-09 DIAGNOSIS — N18.31 TYPE 2 DIABETES MELLITUS WITH STAGE 3A CHRONIC KIDNEY DISEASE, WITHOUT LONG-TERM CURRENT USE OF INSULIN (H): ICD-10-CM

## 2023-10-09 DIAGNOSIS — R41.89 COGNITIVE IMPAIRMENT: ICD-10-CM

## 2023-10-09 DIAGNOSIS — E11.22 TYPE 2 DIABETES MELLITUS WITH STAGE 3A CHRONIC KIDNEY DISEASE, WITHOUT LONG-TERM CURRENT USE OF INSULIN (H): ICD-10-CM

## 2023-10-09 LAB — INR (EXTERNAL): 1.8 (ref 0.9–1.1)

## 2023-10-09 PROCEDURE — 99309 SBSQ NF CARE MODERATE MDM 30: CPT | Performed by: NURSE PRACTITIONER

## 2023-10-09 RX ORDER — BUTALBITAL, ASPIRIN, AND CAFFEINE 325; 50; 40 MG/1; MG/1; MG/1
CAPSULE ORAL
Qty: 15 CAPSULE | Refills: 0 | Status: SHIPPED | OUTPATIENT
Start: 2023-10-09 | End: 2024-01-12

## 2023-10-09 NOTE — TELEPHONE ENCOUNTER
"Note to MD.  Per pt request.  Please have your staff follow up as we do not make outbound calls.      Nurse Triage SBAR    Is this a 2nd Level Triage? NO    Pt is calling due to wanting to go home.  Pt is feeling like she is going to have a   \"Nervous break down if I don't get out of here.\"  Pt has fallen twice she said.  But the second time was due to her grandson not holding onto the walker.  Pt is has been at [facility].  Pt wants to go home.     at the [place] [place] states she was not strong enough.  Missed her brother in law visit before he .  Has known him for 44 years.     Protocol Recommended Disposition:     Recommendation:    Please talk to your nurse and  at the [facility] as well as your family to make a plan to get your strength up.  Pt agrees.  Still wanted a note put in for her MD.    No further action needs by MD at this time.     Brianne Dejesus RN on 10/9/2023 at 11:59 AM       "

## 2023-10-09 NOTE — TELEPHONE ENCOUNTER
"Patient calling again(see other triage from today)stating she wants to leave the TCU.  Patient saw TCU provider today and noted concerns for cognitive issues along with needing assist with ADLs.  Patient stated she is \"going to have a nervous break down if she can't leave\"    Advised that Dr. Lauren is not able to help her as she is under the care of another provider.  Patient stated there must be something someone can do to help her.     Questioned patient on what the provider that saw her today said and she stated she did not see any provider today, but her roommate did.        "

## 2023-10-09 NOTE — PROGRESS NOTES
[place] [facility]  [place] Medical Record Number:  [id number]  Place of Service where encounter took place:  [facility] (TCU) [[id number]]  Chief Complaint   Patient presents with    RECHECK       HPI:    Savanna Rehman  is a 82 year old (1940), who is being seen today for an episodic care visit.  HPI information obtained from: facility chart records, facility staff, and patient report. Today's concern is:    Patient Savanna Rehman 82 yr old female admitted to [facility] for rehabilitation s/post hospitalization [facility] 9/21-9/23/23, recent fall 9/16/23 with head strike and concern for increased confusion.  Treated for potential urinary tract infection   symptoms suspected due to postconcussive syndrome or developing a UTI versus delirium with possible underlying cognitive impairment issues    Negative imaging for acute event:  Noncontrast head CT showed left posterior scalp soft tissue swelling and hematoma with no skull fractures no acute ICH or other pathology.  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  CT C-spine negative.  MR Brain negative.     History fall June 23 with hematoma and concern for safety at home.  Lives alone in apartment    PMHx tremors, hypertension, Afib, DMII, dizziness   Of note allergy listed coumadin and aspirin, however, has been taking at home and tolerating       Chronic atrial fibrillation (H)  Long term current use of anticoagulant therapy  Type 2 diabetes mellitus with stage 3a chronic kidney disease, without long-term current use of insulin (H)  Physical deconditioning  Cognitive impairment    INR 1.8 on Coumadin 5mg 10/6/23 and Coumadin 4.5mg 10/7 & 10/8  No signs and symptoms bleeding    Patient states she feels fine and progressing in therapies   Eager to go home,  involved in  safe plan home  Currently, patient needing help with ADLS and noted cognitive impairment on SLUMS and confusion noted   Per therapies   Previously lived home alone in ILF, no steps to enter, Walked herself to the therapy gym yesterday, SLUMS 13/30 anticipated to improve.  Needing help with ADLS, transfer, dressing and toileting at this time    Blood sugar managed, vitals stable  Patient denies feeling ill     Reports he brother in law passed away, mood appear stable      Past Medical and Surgical History reviewed in Epic today.    MEDICATIONS:    Current Outpatient Medications   Medication Sig Dispense Refill    acetaminophen (TYLENOL) 500 MG tablet Take 1,000 mg by mouth every 6 hours as needed for mild pain      blood glucose (NO BRAND SPECIFIED) lancets standard Use to test blood sugar  as directed. 1 each 0    blood glucose (NO BRAND SPECIFIED) lancing device Device to be used with lancets. Patient requests Amol Microlet 2 lancing device to check blood glucose once per day. 1 each 0    blood glucose (ONETOUCH ULTRA) test strip Use to test blood sugar 1 times daily 100 strip 1    blood glucose calibration (NO BRAND SPECIFIED) solution Use to calibrate blood glucose monitor 1 Bottle 3    blood glucose monitoring (NO BRAND SPECIFIED) meter device kit Use to test blood sugar 1 time daily 1 kit 0    butalbital-aspirin-caffeine (FIORINAL) -40 MG capsule TAKE 1 CAPSULE BY MOUTH EVERY 6 HOURS AS NEEDED FOR HEADACHE 15 capsule 0    Cholecalciferol (VITAMIN D-3) 125 MCG (5000 UT) TABS Take 5,000 Units by mouth daily      DULoxetine (CYMBALTA) 20 MG capsule Take 1 capsule (20 mg) by mouth daily 30 capsule 1    EPINEPHrine (ANY BX GENERIC EQUIV) 0.3 MG/0.3ML injection 2-pack INJECT 0.3MLS (0.3MG) INTO THE MUSCLE ONCE AS NEEDED FOR ANAPHYLAXIS 2 each 1    famotidine (PEPCID) 20 MG tablet Take 20 mg by mouth daily      glipiZIDE (GLUCOTROL XL) 2.5 MG 24 hr tablet Take 1 tablet (2.5 mg) by mouth 2 times daily 180 tablet  "3    indapamide (LOZOL) 1.25 MG tablet Take 1 tablet (1.25 mg) by mouth every morning 30 tablet 3    loperamide (IMODIUM A-D) 2 MG tablet Take 2 mg by mouth 4 times daily as needed for diarrhea      meclizine (ANTIVERT) 12.5 MG tablet TAKE 1 TABLET BY MOUTH THREE TIMES DAILY AS NEEDED FOR DIZZINESS 30 tablet 0    ondansetron (ZOFRAN) 8 MG tablet Take 8 mg by mouth every 8 hours as needed for nausea      psyllium (METAMUCIL/KONSYL) capsule Take 2 capsules by mouth 2 times daily 0.4 mg capsules      WARFARIN SODIUM PO Per INR orders           REVIEW OF SYSTEMS:  4 point ROS including Respiratory, CV, GI and , other than that noted in the HPI,  is negative    Objective:  /72   Pulse 64   Temp 98  F (36.7  C)   Resp 18   Ht 1.727 m (5' 8\")   Wt 107.9 kg (237 lb 12.8 oz)   LMP  (LMP Unknown)   SpO2 92%   BMI 36.16 kg/m    Blood sugar  10/09/2023 08:10 128 mg/dL  10/08/2023 20:39 156 mg/dL  10/08/2023 07:37 142 mg/dL  10/07/2023 20:41 144 mg/dL  10/07/2023 08:09 143 mg/dL  10/06/2023 19:51 191 mg/dL  10/06/2023 07:12 150 mg/dL  10/05/2023 19:40 166 mg/dL  10/05/2023 07:31 138 mg/dL  10/04/2023 20:35 166 mg/dL  10/04/2023 08:00 151 mg/dL  10/03/2023 20:33 184 mg/dL  10/03/2023 08:10 147 mg/dL  Exam:  GENERAL APPEARANCE:  Alert, in no distress, dressed  RESP:  respiratory effort and palpation of chest normal, lungs clear to auscultation , no respiratory distress  CV:  Palpation and auscultation of heart done , regular rate and rhythm  ABDOMEN:  normal bowel sounds, soft, nontender  M/S:   sitting in bed  SKIN:  Inspection of skin and subcutaneous tissue baseline  NEURO:   Cranial nerves 2-12 are normal tested and grossly at patient's baseline  PSYCH:  oriented X 3, memory impaired     Labs:   Labs done in SNF are in [place] EPIC. Please refer to them using EPIC/Care Everywhere.    Last Comprehensive Metabolic Panel:  Lab Results   Component Value Date     09/27/2023    POTASSIUM 4.4 09/27/2023    " CHLORIDE 97 (L) 09/27/2023    CO2 29 09/27/2023    ANIONGAP 12 09/27/2023    GLC 96 09/27/2023    BUN 22.6 09/27/2023    CR 0.91 09/27/2023    GFRESTIMATED 63 09/27/2023    SAUL 9.5 09/27/2023       Lab Results   Component Value Date    WBC 5.9 09/27/2023    WBC 6.1 02/22/2021     Lab Results   Component Value Date    RBC 4.53 09/27/2023    RBC 4.89 02/22/2021     Lab Results   Component Value Date    HGB 12.7 09/27/2023    HGB 14.7 02/22/2021     Lab Results   Component Value Date    HCT 40.0 09/27/2023    HCT 44.5 02/22/2021     No components found for: MCT  Lab Results   Component Value Date    MCV 88 09/27/2023    MCV 91 02/22/2021     Lab Results   Component Value Date    MCH 28.0 09/27/2023    MCH 30.1 02/22/2021     Lab Results   Component Value Date    MCHC 31.8 09/27/2023    MCHC 33.0 02/22/2021     Lab Results   Component Value Date    RDW 16.5 09/27/2023    RDW 14.2 02/22/2021     Lab Results   Component Value Date     09/27/2023     02/22/2021         ASSESSMENT/PLAN:     Chronic atrial fibrillation (H)  Long term current use of anticoagulant therapy  Type 2 diabetes mellitus with stage 3a chronic kidney disease, without long-term current use of insulin (H)  Physical deconditioning  Cognitive impairment    INR 1.8 on Coumadin 5mg 10/6/23 and Coumadin 4.5mg 10/7 & 10/8  No signs and symptoms bleeding  Order Coumadin 5mg daily   INR 10/11/23    Patient states she feels fine and progressing in therapies   Eager to go home,  involved in safe plan home  Currently, patient needing help with ADLS and noted cognitive impairment on SLUMS and confusion noted   Per therapies   Previously lived home alone in ILF, no steps to enter, Walked herself to the therapy gym yesterday, SLUMS 13/30 anticipated to improve.  Needing help with ADLS, transfer, dressing and toileting at this time  Continue therapies    involved in safe plan home     Blood sugar managed, vitals  stable  Continue on current medications to treat for DMII management   Patient denies feeling ill     Reports he brother in law passed away, mood appear stable    Appointments in Next Year        Oct 12, 2023 11:00 AM  (Arrive by 10:45 AM)  Return Patient with Deisy See Andrew Wilson MD  [facility] ([facility] ) [phone]             Note Text: Appointment; 10/11 1:10pm  Dr. Dia: [facility]  Electronically signed by:  BATSHEVA Bryant CNP

## 2023-10-10 VITALS
OXYGEN SATURATION: 96 % | RESPIRATION RATE: 18 BRPM | TEMPERATURE: 98 F | WEIGHT: 234.2 LBS | SYSTOLIC BLOOD PRESSURE: 125 MMHG | DIASTOLIC BLOOD PRESSURE: 82 MMHG | HEART RATE: 71 BPM | HEIGHT: 68 IN | BODY MASS INDEX: 35.49 KG/M2

## 2023-10-10 NOTE — TELEPHONE ENCOUNTER
"Called and spoke with patient to relay provider message. Patient says she will try to \"power through it\".    Thank you,  Dawood Ortez, Triage RN Raffi Oneal  10:03 AM 10/10/2023     "

## 2023-10-10 NOTE — PROGRESS NOTES
[place] [facility]  [place] Medical Record Number:  [id number]  Place of Service where encounter took place:  [facility] (TCU) [[id number]]  Chief Complaint   Patient presents with    INR RESULTS    Anticoagulation    Nursing Home Acute       HPI:    Savanna Rehman  is a 82 year old (1940), who is being seen today for an episodic care visit.  HPI information obtained from: facility chart records, facility staff, and patient report. Today's concern is:    Patient Savanna Rehman 82 yr old female admitted to [facility] for rehabilitation s/post hospitalization [facility] 9/21-9/23/23, recent fall 9/16/23 with head strike and concern for increased confusion.  Treated for potential urinary tract infection   symptoms suspected due to postconcussive syndrome or developing a UTI versus delirium with possible underlying cognitive impairment issues    Negative imaging for acute event:  Noncontrast head CT showed left posterior scalp soft tissue swelling and hematoma with no skull fractures no acute ICH or other pathology.  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  CT C-spine negative.  MR Brain negative.     History fall June 23 with hematoma and concern for safety at home.  Lives alone in apartment    PMHx tremors, hypertension, Afib, DMII, dizziness   Of note allergy listed coumadin and aspirin, however, has been taking at home and tolerating     Chronic atrial fibrillation (H)  Long term current use of anticoagulant therapy  Encounter for therapeutic drug monitoring  Physical deconditioning  Adjustment disorder with mixed anxiety and depressed mood  Diarrhea, unspecified type    Discussed with patient per family request regarding increase mood medication due to concern patient has anxiety  Patient could go up on Cymbalta  Patient declined  and set she is fine and does not want adjustments in medications  Reports she plans to follow up her primary care provider outpatient as needed regarding mood    Discussed family concern patient c/o more gastrointestinal upset  Patient states she is fine; denies recent diarrhea  Reports she plans to follow up gastrointestinal specialist as need for ongoing gastrointestinal issues    INR 2.4 on 10/11/23; on Coumadin 5mg daily  INR 1.8 on 10/9/23  Vitals stable and no signs and symptoms bleeding    Patient telling story regarding her gastrointestinal specialist as good friend and invited her to fish beard, however, at prior visit patient stated this was her primary care provider that invited her to fish beard  Patient does not recall prior visit with writer, impaired short term memory  She is upset regarding discussion of her not being able to go back to her apartment and is somewhat defensive  Declined physical assessment       Past Medical and Surgical History reviewed in Epic today.    MEDICATIONS:    Current Outpatient Medications   Medication Sig Dispense Refill    acetaminophen (TYLENOL) 500 MG tablet Take 1,000 mg by mouth every 6 hours as needed for mild pain      blood glucose (NO BRAND SPECIFIED) lancets standard Use to test blood sugar  as directed. 1 each 0    blood glucose (NO BRAND SPECIFIED) lancing device Device to be used with lancets. Patient requests Brandkidset 2 lancing device to check blood glucose once per day. 1 each 0    blood glucose (ONETOUCH ULTRA) test strip Use to test blood sugar 1 times daily 100 strip 1    blood glucose calibration (NO BRAND SPECIFIED) solution Use to calibrate blood glucose monitor 1 Bottle 3    blood glucose monitoring (NO BRAND SPECIFIED) meter device kit Use to test blood sugar 1 time daily 1 kit 0    butalbital-aspirin-caffeine (FIORINAL) -40 MG capsule TAKE 1 CAPSULE BY MOUTH EVERY 6 HOURS AS NEEDED FOR HEADACHE 15 capsule 0    Cholecalciferol (VITAMIN D-3)  "125 MCG (5000 UT) TABS Take 5,000 Units by mouth daily      DULoxetine (CYMBALTA) 20 MG capsule Take 1 capsule (20 mg) by mouth daily 30 capsule 1    EPINEPHrine (ANY BX GENERIC EQUIV) 0.3 MG/0.3ML injection 2-pack INJECT 0.3MLS (0.3MG) INTO THE MUSCLE ONCE AS NEEDED FOR ANAPHYLAXIS 2 each 1    famotidine (PEPCID) 20 MG tablet Take 20 mg by mouth daily      glipiZIDE (GLUCOTROL XL) 2.5 MG 24 hr tablet Take 1 tablet (2.5 mg) by mouth 2 times daily 180 tablet 3    indapamide (LOZOL) 1.25 MG tablet Take 1 tablet (1.25 mg) by mouth every morning 30 tablet 3    loperamide (IMODIUM A-D) 2 MG tablet Take 2 mg by mouth 4 times daily as needed for diarrhea      meclizine (ANTIVERT) 12.5 MG tablet TAKE 1 TABLET BY MOUTH THREE TIMES DAILY AS NEEDED FOR DIZZINESS 30 tablet 0    ondansetron (ZOFRAN) 8 MG tablet Take 8 mg by mouth every 8 hours as needed for nausea      psyllium (METAMUCIL/KONSYL) capsule Take 2 capsules by mouth 2 times daily 0.4 mg capsules      WARFARIN SODIUM PO Per INR orders           REVIEW OF SYSTEMS:  4 point ROS including Respiratory, CV, GI and , other than that noted in the HPI,  is negative    Objective:  /82   Pulse 71   Temp 98  F (36.7  C)   Resp 18   Ht 1.727 m (5' 8\")   Wt 106.2 kg (234 lb 3.2 oz)   LMP  (LMP Unknown)   SpO2 96%   BMI 35.61 kg/m    Exam:  GENERAL APPEARANCE:  Alert, in no distress  RESP:  no respiratory distress  Declined assessment  M/S:   sitting in bed  SKIN:  Inspection of skin and subcutaneous tissue baseline  PSYCH:  oriented X 3, confusion noted    Labs:   Labs done in SNF are in [place] EPIC. Please refer to them using EPIC/Care Everywhere.    Last Comprehensive Metabolic Panel:  Lab Results   Component Value Date     09/27/2023    POTASSIUM 4.4 09/27/2023    CHLORIDE 97 (L) 09/27/2023    CO2 29 09/27/2023    ANIONGAP 12 09/27/2023    GLC 96 09/27/2023    BUN 22.6 09/27/2023    CR 0.91 09/27/2023    GFRESTIMATED 63 09/27/2023    SAUL 9.5 09/27/2023 " "      Lab Results   Component Value Date    WBC 5.9 09/27/2023    WBC 6.1 02/22/2021     Lab Results   Component Value Date    RBC 4.53 09/27/2023    RBC 4.89 02/22/2021     Lab Results   Component Value Date    HGB 12.7 09/27/2023    HGB 14.7 02/22/2021     Lab Results   Component Value Date    HCT 40.0 09/27/2023    HCT 44.5 02/22/2021     No components found for: \"MCT\"  Lab Results   Component Value Date    MCV 88 09/27/2023    MCV 91 02/22/2021     Lab Results   Component Value Date    MCH 28.0 09/27/2023    MCH 30.1 02/22/2021     Lab Results   Component Value Date    MCHC 31.8 09/27/2023    MCHC 33.0 02/22/2021     Lab Results   Component Value Date    RDW 16.5 09/27/2023    RDW 14.2 02/22/2021     Lab Results   Component Value Date     09/27/2023     02/22/2021         ASSESSMENT/PLAN:     Chronic atrial fibrillation (H)  Long term current use of anticoagulant therapy  Encounter for therapeutic drug monitoring  Physical deconditioning  Adjustment disorder with mixed anxiety and depressed mood  Diarrhea, unspecified type    Discussed with patient per family request regarding increase mood medication due to concern patient has anxiety  Patient could go up on Cymbalta  Patient declined and set she is fine and does not want adjustments in medications  Reports she plans to follow up her primary care provider outpatient as needed regarding mood    Discussed family concern patient c/o more gastrointestinal upset  Patient states she is fine; denies recent diarrhea  Reports she plans to follow up gastrointestinal specialist as need for ongoing gastrointestinal issues    INR 2.4 on 10/11/23; on Coumadin 5mg daily  INR 1.8 on 10/9/23  Order coumadin 4.5mg tonight and Coumadin 5mg 10/12/23  INR 10/13/23  Vitals stable and no signs and symptoms bleeding    Patient telling story regarding her gastrointestinal specialist as good friend and invited her to fish beard, however, at prior visit patient stated this " was her primary care provider that invited her to fish beard  Patient does not recall prior visit with writer, impaired short term memory  She is upset regarding discussion of her not being able to go back to her apartment and is somewhat defensive  Declined physical assessment       Electronically signed by:  BATSHEVA Bryant CNP

## 2023-10-10 NOTE — TELEPHONE ENCOUNTER
Patient was advised this in another telephone encounter.    Thank you,  Dawood Ortez, Triage RN Raffi Oneal  1:20 PM 10/10/2023

## 2023-10-10 NOTE — TELEPHONE ENCOUNTER
I have no privileges for TCU facility.  I can't get involved or give advise    She [place] the  [place].

## 2023-10-11 ENCOUNTER — TELEPHONE (OUTPATIENT)
Dept: GERIATRICS | Facility: CLINIC | Age: 83
End: 2023-10-11

## 2023-10-11 ENCOUNTER — TRANSFERRED RECORDS (OUTPATIENT)
Dept: HEALTH INFORMATION MANAGEMENT | Facility: CLINIC | Age: 83
End: 2023-10-11

## 2023-10-11 ENCOUNTER — TRANSITIONAL CARE UNIT VISIT (OUTPATIENT)
Dept: GERIATRICS | Facility: CLINIC | Age: 83
End: 2023-10-11
Payer: COMMERCIAL

## 2023-10-11 DIAGNOSIS — Z79.01 LONG TERM CURRENT USE OF ANTICOAGULANT THERAPY: ICD-10-CM

## 2023-10-11 DIAGNOSIS — F43.23 ADJUSTMENT DISORDER WITH MIXED ANXIETY AND DEPRESSED MOOD: ICD-10-CM

## 2023-10-11 DIAGNOSIS — R53.81 PHYSICAL DECONDITIONING: ICD-10-CM

## 2023-10-11 DIAGNOSIS — R19.7 DIARRHEA, UNSPECIFIED TYPE: ICD-10-CM

## 2023-10-11 DIAGNOSIS — Z51.81 ENCOUNTER FOR THERAPEUTIC DRUG MONITORING: ICD-10-CM

## 2023-10-11 DIAGNOSIS — I48.20 CHRONIC ATRIAL FIBRILLATION (H): Primary | ICD-10-CM

## 2023-10-11 PROCEDURE — 99309 SBSQ NF CARE MODERATE MDM 30: CPT | Performed by: NURSE PRACTITIONER

## 2023-10-12 ENCOUNTER — PRE VISIT (OUTPATIENT)
Dept: UROLOGY | Facility: CLINIC | Age: 83
End: 2023-10-12

## 2023-10-12 ENCOUNTER — TELEPHONE (OUTPATIENT)
Dept: INTERNAL MEDICINE | Facility: CLINIC | Age: 83
End: 2023-10-12

## 2023-10-12 NOTE — TELEPHONE ENCOUNTER
See note from 10/11/2023, pt left facility AMA, she is now calling for a follow up visit.    Dr. Lauren, do you want us to schedule her for a follow up?

## 2023-10-12 NOTE — TELEPHONE ENCOUNTER
Reason for Call:  Appointment Request    Patient requesting this type of appt:  Hospital/ED Follow-Up     Requested provider: Patti Suárez    Reason patient unable to be scheduled: Not within requested timeframe    When does patient want to be seen/preferred time: 3-7 days    Comments:   [facility] Discharge 10/11 fall    Could we send this information to you in [facility] or would you prefer to receive a phone call?:   Patient would prefer a phone call   Okay to leave a detailed message?: Yes at Cell number on file:    Telephone Information:   Mobile [phone]       Call taken on 10/12/2023 at 9:29 AM by Patti Reed

## 2023-10-12 NOTE — TELEPHONE ENCOUNTER
Reason for visit: Symptom check     Relevant information: Omar, LUTS    Records/imaging/labs/orders: pt did not do x-ray    Pt called: No need for a call    At Rooming: brigitte Perkins CMA  10/12/2023  8:00 AM

## 2023-10-13 ENCOUNTER — TELEPHONE (OUTPATIENT)
Dept: INTERNAL MEDICINE | Facility: CLINIC | Age: 83
End: 2023-10-13
Payer: COMMERCIAL

## 2023-10-13 DIAGNOSIS — F41.9 ANXIETY AND DEPRESSION: ICD-10-CM

## 2023-10-13 DIAGNOSIS — K74.60 CIRRHOSIS OF LIVER WITHOUT ASCITES (H): Primary | ICD-10-CM

## 2023-10-13 DIAGNOSIS — F32.A ANXIETY AND DEPRESSION: ICD-10-CM

## 2023-10-13 NOTE — TELEPHONE ENCOUNTER
Patient advised that Dr. Lauren is unable to fit her in.  Patient states that her [facility] stated that she needs to be seen by her PCP as soon as possible.  Offered Patient appointment on Tuesday, October 17th with Leena Khan.  Patient states she will be in trouble by Tuesday and wants to be seen by an MD before Tuesday.  Patient states she may just go to the UC to be seen.    Patient reluctantly took appointment with Leena Khan.

## 2023-10-13 NOTE — TELEPHONE ENCOUNTER
Patient calling again asking for a response from Dr. Lauren for an appointment.  Patient states she is getting very upset because she can not wait until January ans her day is almost over for today.

## 2023-10-13 NOTE — TELEPHONE ENCOUNTER
Raj with [facility] ([phone]) calls for verbal orders to re open Patient for SN 1xwk effective 10/17/23.  OK to leave a detailed message.

## 2023-10-13 NOTE — TELEPHONE ENCOUNTER
Cymbalta       Last Written Prescription Date:  08/29/23  Last Fill Quantity: 30,   # refills: 1  Last Office Visit: 09/21/23  Future Office visit:    Next 5 appointments (look out 90 days)      Oct 17, 2023  2:30 PM  (Arrive by 2:10 PM)  Provider Visit with BATSHEVA Ann CNP  [facility] ([facility] ) [address]  [address]  [facility] [id number] [phone]             Routing refill request to provider for review/approval because:  Drug not on the [facility], [facility] or [facility] refill protocol or controlled substance  Patient is asking for an increase in dosage.

## 2023-10-13 NOTE — TELEPHONE ENCOUNTER
Patient calls after leaving TCU AMA.  Patient was told to be seen by PCP as soon as possible.  Please address and advise.

## 2023-10-16 ENCOUNTER — MEDICAL CORRESPONDENCE (OUTPATIENT)
Dept: HEALTH INFORMATION MANAGEMENT | Facility: CLINIC | Age: 83
End: 2023-10-16

## 2023-10-17 ENCOUNTER — ANTICOAGULATION THERAPY VISIT (OUTPATIENT)
Dept: ANTICOAGULATION | Facility: CLINIC | Age: 83
End: 2023-10-17

## 2023-10-17 ENCOUNTER — OFFICE VISIT (OUTPATIENT)
Dept: INTERNAL MEDICINE | Facility: CLINIC | Age: 83
End: 2023-10-17
Payer: COMMERCIAL

## 2023-10-17 ENCOUNTER — MEDICAL CORRESPONDENCE (OUTPATIENT)
Dept: HEALTH INFORMATION MANAGEMENT | Facility: CLINIC | Age: 83
End: 2023-10-17

## 2023-10-17 ENCOUNTER — DOCUMENTATION ONLY (OUTPATIENT)
Dept: LAB | Facility: CLINIC | Age: 83
End: 2023-10-17

## 2023-10-17 VITALS
DIASTOLIC BLOOD PRESSURE: 75 MMHG | HEIGHT: 68 IN | RESPIRATION RATE: 20 BRPM | TEMPERATURE: 97.2 F | WEIGHT: 231 LBS | BODY MASS INDEX: 35.01 KG/M2 | OXYGEN SATURATION: 100 % | SYSTOLIC BLOOD PRESSURE: 133 MMHG | HEART RATE: 70 BPM

## 2023-10-17 DIAGNOSIS — E55.9 VITAMIN D DEFICIENCY: Primary | ICD-10-CM

## 2023-10-17 DIAGNOSIS — T87.89: ICD-10-CM

## 2023-10-17 DIAGNOSIS — Z71.89 COUNSELING AND COORDINATION OF CARE: ICD-10-CM

## 2023-10-17 DIAGNOSIS — F41.9 ANXIETY AND DEPRESSION: ICD-10-CM

## 2023-10-17 DIAGNOSIS — Z91.81 HISTORY OF RECENT FALL: Primary | ICD-10-CM

## 2023-10-17 DIAGNOSIS — I48.20 CHRONIC ATRIAL FIBRILLATION (H): ICD-10-CM

## 2023-10-17 DIAGNOSIS — I48.21 PERMANENT ATRIAL FIBRILLATION (H): Primary | ICD-10-CM

## 2023-10-17 DIAGNOSIS — F32.A ANXIETY AND DEPRESSION: ICD-10-CM

## 2023-10-17 LAB — INR (EXTERNAL): 2.3 (ref 0.9–1.1)

## 2023-10-17 PROCEDURE — 99214 OFFICE O/P EST MOD 30 MIN: CPT

## 2023-10-17 RX ORDER — DULOXETIN HYDROCHLORIDE 20 MG/1
20 CAPSULE, DELAYED RELEASE ORAL DAILY
Qty: 30 CAPSULE | Refills: 1 | Status: SHIPPED | OUTPATIENT
Start: 2023-10-17 | End: 2024-01-12

## 2023-10-17 NOTE — PROGRESS NOTES
Savanna Rehman has an upcoming lab appointment and is requesting for a recheck on Vit D. There is currently no orders in for this. Please review.     Health Maintenance Due   Topic    MICROALBUMIN     ANNUAL REVIEW OF  ORDERS     LIPID      Thank You,  Shelbi THORNE III   [facility]

## 2023-10-17 NOTE — PROGRESS NOTES
Assessment & Plan     (Z91.81) History of recent fall  (primary encounter diagnosis)  Comment: Patient presents to the clinic with a history of a recent fall.  She was recently seen in the hospital and spent some time in a TCU.  She has since been discharged back to her senior living which is a 55+.  She lives independently and has been using homebound services.  At this time she is requesting assistance on her home health services and knowing all that they offer.  Therefore I recommend that she work closely with care coordination.  Plan: Care coordination referral placed.    (Z71.89) Counseling and coordination of care  Comment: Patient needing help with independent care as well as coordinating homebound care services.  Plan: Primary care care coordination referral placed.    (F41.9,  F32.A) Anxiety and depression  Comment: Patient has a longstanding history of anxiety and depression and is requesting a refill of her Cymbalta medication.  Plan: Primary Care - Care Coordination Referral,         DULoxetine (CYMBALTA) 20 MG capsule        Refill sent.      30 minutes spent by me on the date of the encounter doing chart review, patient visit, and documentation      MED REC REQUIRED  Post Medication Reconciliation Status:         [facility]    Mamadou Corrales is a 82 year old, presenting for the following health issues: Has lost  pounds since September 21st. She has a brand new scale at home.   She is now at a senior living 55+ and is using homebound services but needs more assistance with all the services they offer.   Would like a refill of her cymbalta     RECHECK and Fall      10/17/2023     2:06 PM   Additional Questions   Roomed by UMBERTO Dudley LPN   Accompanied by self         10/17/2023     2:06 PM   Patient Reported Additional Medications   Patient reports taking the following new medications none       History of Present Illness       Reason for visit:  TCU  "follow up.    She eats 0-1 servings of fruits and vegetables daily.She consumes 0 sweetened beverage(s) daily.            Hospital Follow-up Visit:    Hospital/Nursing Home/IP Rehab Facility: [facility]  Date of Admission: 9-  Date of Discharge: 9- to [facility]  Reason(s) for Admission: fall    Was your hospitalization related to COVID-19? No   Problems taking medications regularly:  None  Medication changes since discharge: None  Problems adhering to non-medication therapy:  None    Summary of hospitalization:  [facility] discharge summary reviewed  Diagnostic Tests/Treatments reviewed.  Follow up needed: none  Other Healthcare Providers Involved in Patient s Care:         None  Update since discharge: improved.         Plan of care communicated with patient                   Review of Systems   Constitutional, HEENT, cardiovascular, pulmonary, gi and gu systems are negative, except as otherwise noted.      Objective    BP (!) 142/80   Pulse 70   Temp 97.2  F (36.2  C) (Oral)   Resp 20   Ht 1.727 m (5' 8\")   Wt 104.8 kg (231 lb)   LMP  (LMP Unknown)   SpO2 100%   Breastfeeding No   BMI 35.12 kg/m    Body mass index is 35.12 kg/m .  Physical Exam   GENERAL: healthy, alert and no distress  NECK: no adenopathy, no asymmetry, masses, or scars and thyroid normal to palpation  RESP: lungs clear to auscultation - no rales, rhonchi or wheezes  CV: regular rate and rhythm, normal S1 S2, no S3 or S4, no murmur, click or rub, no peripheral edema and peripheral pulses strong  ABDOMEN: soft, nontender, no hepatosplenomegaly, no masses and bowel sounds normal  MS: no gross musculoskeletal defects noted, no edema                    "

## 2023-10-17 NOTE — PROGRESS NOTES
ANTICOAGULATION MANAGEMENT     Savanna Rehman 82 year old female is on warfarin with therapeutic INR result. (Goal INR 2.0-3.0)    Recent labs: (last 7 days)     10/17/23  1147   INR 2.3*       ASSESSMENT     Source(s): Chart Review and Home Care/Facility Nurse     Warfarin doses taken: More warfarin taken than planned which may be affecting INR. Since returning home Savanna resumed her previous maintenance dose that she had been stable at with the tablet supply she had at home. Unclear what she was directed to do at discharge from TCU as it seems she left AMA. Last orders at TCU were for 5mg daily.   Diet:  appetite is not great, but she is eating  Medication/supplement changes: None noted  New illness, injury, or hospitalization: Yes: just discharged home from TCU last Wed 10/11/23. Was hospitalized in September for fall, increased confusion, UTI, encephalopathy  Signs or symptoms of bleeding or clotting: No  Previous result: Subtherapeutic  Additional findings:  resumption of home care visit today       PLAN     Recommended plan for temporary change(s) affecting INR     Dosing Instructions: Continue your current warfarin dose with next INR in 1 week       Summary  As of 10/17/2023      Full warfarin instructions:  2.5 mg every Mon, Thu; 3.75 mg all other days   Next INR check:  10/24/2023               Telephone call with Raj home care nurse who verbalizes understanding and agrees to plan    Orders given to  Homecare nurse/facility to recheck    Education provided:   Contact [phone]  with any changes, questions or concerns.     Plan made per [facility] anticoagulation protocol    Sahra Ferrari RN  Anticoagulation Clinic  10/17/2023    _______________________________________________________________________     Anticoagulation Episode Summary       Current INR goal:  2.0-3.0   TTR:  51.6% (1 y)   Target end date:  Indefinite   Send INR reminders to:  NIKKI CHASE    Indications    Permanent atrial  fibrillation (H) [I48.21]  Chronic atrial fibrillation (H) [I48.20]  Long term (current) use of anticoagulants (Resolved) [Z79.01]             Comments:  Home care              Anticoagulation Care Providers       Provider Role Specialty Phone number    Patti Suárez MD Referring Internal Medicine [phone]

## 2023-10-18 ENCOUNTER — PATIENT OUTREACH (OUTPATIENT)
Dept: CARE COORDINATION | Facility: CLINIC | Age: 83
End: 2023-10-18
Payer: COMMERCIAL

## 2023-10-18 PROBLEM — T87.89: Status: ACTIVE | Noted: 2023-10-18

## 2023-10-18 NOTE — PROGRESS NOTES
Clinic Care Coordination Contact  Community Health Worker Initial Outreach    CHW Initial Information Gathering:  Referral Source: PCP  Current living arrangement:: I live alone  Type of residence:: Apartment (55 + apartment)  Community Resources:  ([facility] services (WOODYLeeanna comes every Tuesday).)  Equipment Currently Used at Home: wheelchair, power, shower chair  Informal Support system:: Family  No PCP office visit in Past Year: No  Transportation means:: [employer], Family       Patient accepts CC: No, Patient declined CC assistance at this time. Patient will be sent Care Coordination introduction letter for future reference.     10-18, CHW:    CHW was able to connect with the Patient and introduce self/care coordination and intent of call. Patient describes dissatisfaction with the TCU she stayed at, [facility]. Informed Patient that Writer is happy to listen to her concerns, however recommended Patient call Patient relations directly regarding concerns to ensure they are appropriately addressed. Patient voiced understanding and is agreeable with the Writer including their contact information in the CC introduction letter.     Patient is working with a  for her Waiver. The 's name is Iwona Chavez. Patient states they are working together on a number of things. Currently, they are working on getting a lift chair, and iwona is processing paperwork for the Patient regarding moving to a higher level of care apartment. Patient states that she would rather stay at her current apartment. Patient also has a LPN, Leeanna, come to her house every Tuesday through the Waiver program.    Patient notes transportation is difficult. Patient states that a gal sent her transportation resources in the past, however they are duplicative to resources she has already received. Per chart review, Anjelica Berrios RN sent the Patient transportation resources on 1/27/2022.  Informed Patient that CC can only provide what is available in the community. Patient is using [employer] at this time. Encouraged the patient to revisit the transportation resources if needed; Patient voiced understanding.     Patient describes that finances are tight. Inquired if the Patient would like CC assistance getting SNAP/county benefits or other financial support such as housing assistance, the Patient declined. Patient stated that she does not know if she is eligible and does not want to take funds away from people who really needed it. Informed Patient that a more in depth assessment by CC SW/FRW would be needed to determine eligibility, however Patient declined. Patient states she would like HHC PT. Informed that it would be best to schedule an initial assessment with CC SW to discuss as making HHC arrangements, however the Patient declined.     Writer encouraged the Patient to call back to CC if she would like County benefits or to get Home Health Care PT. Patient is agreeable with plan and has no additional questions or concerns during the time of call.       Rachael LANDON Public Health  Community Health Worker  [facility]:  [facility] Care Coordination  [phone]

## 2023-10-18 NOTE — LETTER
[facility] CARE COORDINATION  [facility]  October 18, 2023    Savanna Rehman  [address]   [place] [address]      Dear Savanna,        I am a  clinic community health worker who works with Patti Suárez MD with the [facility]. I wanted to thank you for spending the time to talk with me.  Below is a description of clinic care coordination and how I can further assist you.       The clinic care coordination team is made up of a registered nurse, , financial resource worker and community health worker who understand the health care system. The goal of clinic care coordination is to help you manage your health and improve access to the health care system. Our team works alongside your provider to assist you in determining your health and social needs. We can help you obtain health care and community resources, providing you with necessary information and education. We can work with you through any barriers and develop a care plan that helps coordinate and strengthen the communication between you and your care team.  Our services are voluntary and are offered without charge to you personally.    Here is the Patient Relations contact information:    [facility], Patient Relations  [address], [address], [place] [address]  Phone: [phone], Email: [email or web address]      Please feel free to contact me with any questions or concerns regarding care coordination and what we can offer.      We are focused on providing you with the highest-quality healthcare experience possible.    Sincerely,       Rachael LANDON Public Health  Community Health Worker  [facility]:  [facility]   Clinic Care Coordination  [phone]      And    ANA LU[facility]  Social Work Care Coordinator  [facility] [place]  Phone:  [phone]

## 2023-10-19 ENCOUNTER — MEDICAL CORRESPONDENCE (OUTPATIENT)
Dept: HEALTH INFORMATION MANAGEMENT | Facility: CLINIC | Age: 83
End: 2023-10-19

## 2023-10-19 ENCOUNTER — LAB (OUTPATIENT)
Dept: LAB | Facility: CLINIC | Age: 83
End: 2023-10-19
Payer: COMMERCIAL

## 2023-10-19 DIAGNOSIS — R74.8 ABNORMAL LIVER ENZYMES: ICD-10-CM

## 2023-10-19 DIAGNOSIS — K74.60 CIRRHOSIS OF LIVER WITHOUT ASCITES (H): ICD-10-CM

## 2023-10-19 DIAGNOSIS — E55.9 VITAMIN D DEFICIENCY: ICD-10-CM

## 2023-10-19 DIAGNOSIS — Z13.220 SCREENING FOR HYPERLIPIDEMIA: ICD-10-CM

## 2023-10-19 DIAGNOSIS — E11.9 DIABETES MELLITUS, TYPE 2 (H): ICD-10-CM

## 2023-10-19 DIAGNOSIS — N18.30 CHRONIC KIDNEY DISEASE, STAGE 3 (H): Primary | ICD-10-CM

## 2023-10-19 DIAGNOSIS — R19.5 LOOSE STOOLS: ICD-10-CM

## 2023-10-19 LAB
ALBUMIN SERPL BCG-MCNC: 4.1 G/DL (ref 3.5–5.2)
ALP SERPL-CCNC: 123 U/L (ref 35–104)
ALT SERPL W P-5'-P-CCNC: 24 U/L (ref 0–50)
AST SERPL W P-5'-P-CCNC: 43 U/L (ref 0–45)
BILIRUB DIRECT SERPL-MCNC: 0.36 MG/DL (ref 0–0.3)
BILIRUB SERPL-MCNC: 1 MG/DL
CHOLEST SERPL-MCNC: 136 MG/DL
HDLC SERPL-MCNC: 42 MG/DL
LDLC SERPL CALC-MCNC: 78 MG/DL
NONHDLC SERPL-MCNC: 94 MG/DL
PROT SERPL-MCNC: 7.8 G/DL (ref 6.4–8.3)
TRIGL SERPL-MCNC: 79 MG/DL
VIT D+METAB SERPL-MCNC: 42 NG/ML (ref 20–50)

## 2023-10-19 PROCEDURE — 80061 LIPID PANEL: CPT

## 2023-10-19 PROCEDURE — 82306 VITAMIN D 25 HYDROXY: CPT

## 2023-10-19 PROCEDURE — 80076 HEPATIC FUNCTION PANEL: CPT

## 2023-10-19 PROCEDURE — 36415 COLL VENOUS BLD VENIPUNCTURE: CPT

## 2023-10-20 ENCOUNTER — TELEPHONE (OUTPATIENT)
Dept: INTERNAL MEDICINE | Facility: CLINIC | Age: 83
End: 2023-10-20
Payer: COMMERCIAL

## 2023-10-23 ENCOUNTER — MEDICAL CORRESPONDENCE (OUTPATIENT)
Dept: HEALTH INFORMATION MANAGEMENT | Facility: CLINIC | Age: 83
End: 2023-10-23
Payer: COMMERCIAL

## 2023-10-24 ENCOUNTER — TELEPHONE (OUTPATIENT)
Dept: INTERNAL MEDICINE | Facility: CLINIC | Age: 83
End: 2023-10-24
Payer: COMMERCIAL

## 2023-10-24 ENCOUNTER — ANTICOAGULATION THERAPY VISIT (OUTPATIENT)
Dept: ANTICOAGULATION | Facility: CLINIC | Age: 83
End: 2023-10-24
Payer: COMMERCIAL

## 2023-10-24 DIAGNOSIS — I48.20 CHRONIC ATRIAL FIBRILLATION (H): ICD-10-CM

## 2023-10-24 DIAGNOSIS — Z53.9 DIAGNOSIS NOT YET DEFINED: Primary | ICD-10-CM

## 2023-10-24 DIAGNOSIS — I48.21 PERMANENT ATRIAL FIBRILLATION (H): Primary | ICD-10-CM

## 2023-10-24 LAB — INR (EXTERNAL): 3.2 (ref 0.9–1.1)

## 2023-10-24 PROCEDURE — G0180 MD CERTIFICATION HHA PATIENT: HCPCS | Performed by: INTERNAL MEDICINE

## 2023-10-24 NOTE — TELEPHONE ENCOUNTER
Fax received from [facility] 10/17/23 for review and signature.  Put in Dr. Lauren's in basket.

## 2023-10-24 NOTE — PROGRESS NOTES
ANTICOAGULATION MANAGEMENT     Savanna Rehman 82 year old female is on warfarin with supratherapeutic INR result. (Goal INR 2.0-3.0)    Recent labs: (last 7 days)     10/24/23  1315   INR 3.2*       ASSESSMENT     Source(s): Chart Review and Home Care/Facility Nurse     Warfarin doses taken: Warfarin taken as instructed  Diet: No new diet changes identified  Medication/supplement changes: None noted  New illness, injury, or hospitalization: No  Signs or symptoms of bleeding or clotting: No  Previous result: Therapeutic last visit; previously outside of goal range  Additional findings:  Recent TCU Discharge       PLAN     Recommended plan for no diet, medication or health factor changes affecting INR     Dosing Instructions: partial hold then decrease your warfarin dose (5% change) with next INR in 1 week       Summary  As of 10/24/2023      Full warfarin instructions:  10/24: 2.5 mg; Otherwise 2.5 mg every Mon, Thu, Sat; 3.75 mg all other days   Next INR check:  10/31/2023               Telephone call with Raj home care nurse who agrees to plan and repeated back plan correctly    Orders given to  Homecare nurse/facility to recheck    Education provided:   Please call back if any changes to your diet, medications or how you've been taking warfarin    Plan made per [facility] anticoagulation protocol    Azul Whitaker RN  Anticoagulation Clinic  10/24/2023    _______________________________________________________________________     Anticoagulation Episode Summary       Current INR goal:  2.0-3.0   TTR:  52.7% (1 y)   Target end date:  Indefinite   Send INR reminders to:  NIKKI CHASE    Indications    Permanent atrial fibrillation (H) [I48.21]  Chronic atrial fibrillation (H) [I48.20]  Long term (current) use of anticoagulants (Resolved) [Z79.01]             Comments:  Home care              Anticoagulation Care Providers       Provider Role Specialty Phone number    Patti Suárez MD Referring  Internal Medicine [phone]

## 2023-10-30 ENCOUNTER — TRANSFERRED RECORDS (OUTPATIENT)
Dept: HEALTH INFORMATION MANAGEMENT | Facility: CLINIC | Age: 83
End: 2023-10-30
Payer: COMMERCIAL

## 2023-11-02 ENCOUNTER — TELEPHONE (OUTPATIENT)
Dept: INTERNAL MEDICINE | Facility: CLINIC | Age: 83
End: 2023-11-02
Payer: COMMERCIAL

## 2023-11-02 NOTE — TELEPHONE ENCOUNTER
Patient has been losing a lot of hair and wants to know if a medication is causing this and if there is something she can take to help the loss. Patient uses [employer] [facility] [employer]. Ok to call and  [phone]

## 2023-11-05 ENCOUNTER — NURSE TRIAGE (OUTPATIENT)
Dept: NURSING | Facility: CLINIC | Age: 83
End: 2023-11-05
Payer: COMMERCIAL

## 2023-11-06 ENCOUNTER — LAB (OUTPATIENT)
Dept: LAB | Facility: CLINIC | Age: 83
End: 2023-11-06
Payer: COMMERCIAL

## 2023-11-06 ENCOUNTER — MYC MEDICAL ADVICE (OUTPATIENT)
Dept: INTERNAL MEDICINE | Facility: CLINIC | Age: 83
End: 2023-11-06

## 2023-11-06 ENCOUNTER — TELEPHONE (OUTPATIENT)
Dept: INTERNAL MEDICINE | Facility: CLINIC | Age: 83
End: 2023-11-06

## 2023-11-06 DIAGNOSIS — R19.7 DIARRHEA, UNSPECIFIED TYPE: Primary | ICD-10-CM

## 2023-11-06 LAB — C DIFF TOX B STL QL: NEGATIVE

## 2023-11-06 PROCEDURE — 87493 C DIFF AMPLIFIED PROBE: CPT

## 2023-11-06 NOTE — TELEPHONE ENCOUNTER
Did anybody advise pt to get stool sample?, pt was seen by [facility] 10/30/23, did they want her to get C diff?  If GI wants c diff, they have to order the test as she was not triaged or seen this pt for diarrhea  in this clinc, pl check with pt

## 2023-11-06 NOTE — TELEPHONE ENCOUNTER
Patient has a stool sample dropped off to check for C-Diff but there are no orders.  Please place order asap.

## 2023-11-06 NOTE — TELEPHONE ENCOUNTER
Patient calls back to says she has not heard back regarding her hair loss question.She prefers phone calls to [employer] messages.     At this time she is declining a Dr appointment to discuss over the counter hair loss medication options.

## 2023-11-06 NOTE — TELEPHONE ENCOUNTER
Nurse Triage SBAR    Is this a 2nd Level Triage? NO    Situation: Lab Question    Background: :Patient was evaluated for diarrhea and was sent home with specimen cup and hat.    Assessment: Patient inquiring on how to clean hat and when [facility] lab will be open. Informed patient to clean hat with with soap and water for collection to rule out C Diff. Scheduling advised lab's first appointment is at 0700 tomorrow.    No additional concerns or questions      Reason for Disposition   Health Information question, no triage required and triager able to answer question    Additional Information   Negative: [1] Caller is not with the adult (patient) AND [2] reporting urgent symptoms   Negative: Lab result questions   Negative: Medication questions   Negative: Caller can't be reached by phone   Negative: Caller has already spoken to PCP or another triager   Negative: RN needs further essential information from caller in order to complete triage   Negative: Requesting regular office appointment   Negative: [1] Caller requesting NON-URGENT health information AND [2] PCP's office is the best resource    Protocols used: Information Only Call - [facility]

## 2023-11-06 NOTE — TELEPHONE ENCOUNTER
Patient calling again. She is upset that there is not an active order for her stool sample to be processed and is adamant that she needs to have this done. Advised patient to contact her GI office and request and order to be placed. Patient agreed and will contact GI office.

## 2023-11-07 ENCOUNTER — ANTICOAGULATION THERAPY VISIT (OUTPATIENT)
Dept: ANTICOAGULATION | Facility: CLINIC | Age: 83
End: 2023-11-07
Payer: COMMERCIAL

## 2023-11-07 ENCOUNTER — TELEPHONE (OUTPATIENT)
Dept: INTERNAL MEDICINE | Facility: CLINIC | Age: 83
End: 2023-11-07
Payer: COMMERCIAL

## 2023-11-07 ENCOUNTER — DOCUMENTATION ONLY (OUTPATIENT)
Dept: ANTICOAGULATION | Facility: CLINIC | Age: 83
End: 2023-11-07
Payer: COMMERCIAL

## 2023-11-07 DIAGNOSIS — I48.20 CHRONIC ATRIAL FIBRILLATION (H): ICD-10-CM

## 2023-11-07 DIAGNOSIS — I48.21 PERMANENT ATRIAL FIBRILLATION (H): Primary | ICD-10-CM

## 2023-11-07 LAB — INR (EXTERNAL): 1.9 (ref 0.9–1.1)

## 2023-11-07 NOTE — PROGRESS NOTES
ANTICOAGULATION     Savanna MIMS Sheilakarie is overdue for an INR check.     Spoke with home care nurse Evelin who states patient cancelled her home care visit on 10/31/23, but she will be seeing patient later today and will recheck INR.    Dorothy River RN

## 2023-11-07 NOTE — PROGRESS NOTES
ANTICOAGULATION MANAGEMENT     Savanna Rehman 82 year old female is on warfarin with subtherapeutic INR result. (Goal INR 2.0-3.0)    Recent labs: (last 7 days)     11/07/23  1253   INR 1.9*       ASSESSMENT     Source(s): Chart Review, Patient/Caregiver Call, and Home Care/Facility Nurse     Warfarin doses taken: Less warfarin taken than planned which may be affecting INR, Missed dose 11/4/23  Diet: No new diet changes identified  Medication/supplement changes: None noted  New illness, injury, or hospitalization: No  Signs or symptoms of bleeding or clotting: No  Previous result: Supratherapeutic  Additional findings: None       PLAN     Recommended plan for temporary change(s) affecting INR     Dosing Instructions:  11/7/23: Boost, then continue current maintenance dose  with next INR in 1 week       Summary  As of 11/7/2023      Full warfarin instructions:  11/7: 5 mg; Otherwise 2.5 mg every Mon, Thu, Sat; 3.75 mg all other days   Next INR check:  11/14/2023               Telephone call with Evelin home care nurse who agrees to plan and repeated back plan correctly    Orders given to  Homecare nurse/facility to recheck    Education provided:   Please call back if any changes to your diet, medications or how you've been taking warfarin    Plan made per [facility] anticoagulation protocol    Negrita Gramajo RN  Anticoagulation Clinic  11/7/2023    _______________________________________________________________________     Anticoagulation Episode Summary       Current INR goal:  2.0-3.0   TTR:  52.5% (1 y)   Target end date:  Indefinite   Send INR reminders to:  NIKKI CHASE    Indications    Permanent atrial fibrillation (H) [I48.21]  Chronic atrial fibrillation (H) [I48.20]  Long term (current) use of anticoagulants (Resolved) [Z79.01]             Comments:  Home care              Anticoagulation Care Providers       Provider Role Specialty Phone number    Patti Suárez MD Referring Internal  Medicine [phone]

## 2023-11-08 ENCOUNTER — NURSE TRIAGE (OUTPATIENT)
Dept: NURSING | Facility: CLINIC | Age: 83
End: 2023-11-08
Payer: COMMERCIAL

## 2023-11-08 DIAGNOSIS — R19.7 DIARRHEA, UNSPECIFIED TYPE: Primary | ICD-10-CM

## 2023-11-08 NOTE — TELEPHONE ENCOUNTER
"  Called pt back.  LM to call her clinic.  Will send the message to connect with the pt.      Nurse Triage SBAR    Is this a 2nd Level Triage? Yes      Please call pt back to assess her sxs.  We were in the middle of triage and she had to grab the other line her  was calling.    Pt is calling to get her C diff. Results which were negative.  She is going to the \"bathroom every 50-60 minutes.  I can't sit. I am so sore.\"     Abdominal pain is getting worse 5-6/10.  Her GI doctor is aware.    Has liver 3 stage dx. Has    Fallen 6 times.  The last time was on 9/16/23.  Her L leg is swollen.    Best girlfriend and  is coming today and states she is not able to go to the clinic.    Not able to assess of DVT.  Pt refusing to go in today.      Protocol Recommended Disposition:   No disposition on file.    Recommendation:  ER/UC or visit.     Routed to provider      Reason for Disposition   Constant abdominal pain lasting > 2 hours    Additional Information   Negative: Shock suspected (e.g., cold/pale/clammy skin, too weak to stand, low BP, rapid pulse)   Negative: Difficult to awaken or acting confused (e.g., disoriented, slurred speech)   Negative: Sounds like a life-threatening emergency to the triager   Negative: Vomiting also present and worse than the diarrhea   Negative: Blood in stool and without diarrhea   Negative: SEVERE abdominal pain (e.g., excruciating) and present > 1 hour   Negative: SEVERE abdominal pain and age > 60 years   Negative: Bloody, black, or tarry bowel movements  (Exception: Chronic-unchanged black-grey bowel movements and is taking iron pills or Pepto-Bismol.)   Negative: SEVERE diarrhea (e.g., 7 or more times / day more than normal) and age > 60 years    Protocols used: Diarrhea-A-[place]    Brianne Dejesus RN on 11/8/2023 at 9:03 AM   "

## 2023-11-09 ENCOUNTER — TRANSFERRED RECORDS (OUTPATIENT)
Dept: HEALTH INFORMATION MANAGEMENT | Facility: CLINIC | Age: 83
End: 2023-11-09
Payer: COMMERCIAL

## 2023-11-09 RX ORDER — LOPERAMIDE HYDROCHLORIDE 2 MG/1
2 TABLET ORAL 4 TIMES DAILY PRN
Qty: 30 TABLET | Refills: 0 | Status: SHIPPED | OUTPATIENT
Start: 2023-11-09 | End: 2024-01-12

## 2023-11-09 NOTE — TELEPHONE ENCOUNTER
I am the covering provider for Dr. Lauren .Imodium treatment sent to her pharmacy, please advise patient to follow-up with the gastroenterologist regarding diarrhea

## 2023-11-09 NOTE — TELEPHONE ENCOUNTER
"Call to patient. Loose stools were her main concern yesterday. States she needs Imodium \"in a bottle\". Patient is taking up to 4 Imodium per day. Over the counter Imodium comes in blister packs and she is not able to punch them out. She needs to cut them out of the blister pack and she has been cutting the capsules accidentally.    Patient is surprised the cdiff culture was negative. Gastro told her to take Imodium 2 capsules in the morning and 1 capsule after every loose stool but not to exceed 4 capsules. She is to report back to gastroenterologist tomorrow.    Patient states she was calling here to make an appointment anyway so this is why she is asking us for this prescription. States she waited on hold for [facility] for 22 minutes yesterday.   "

## 2023-11-09 NOTE — TELEPHONE ENCOUNTER
Patient is calling to ask if she can get a rx for immodium sent to her [facility] pharmacy. She knows this can be bought OTC but she would like a rx if possible.

## 2023-11-14 ENCOUNTER — HOSPITAL ENCOUNTER (OUTPATIENT)
Dept: ULTRASOUND IMAGING | Facility: CLINIC | Age: 83
Discharge: HOME | End: 2023-11-14
Attending: INTERNAL MEDICINE | Admitting: INTERNAL MEDICINE
Payer: COMMERCIAL

## 2023-11-14 DIAGNOSIS — K74.60 CIRRHOSIS (H): ICD-10-CM

## 2023-11-14 PROCEDURE — 76705 ECHO EXAM OF ABDOMEN: CPT

## 2023-11-14 NOTE — PROGRESS NOTES
11/14/23 1:45 PM   Home care nurse called to report patient canceled home care visit for today. (Patient does this frequently per home care nurse) home care nurse will get out to patient house this week per patients availability. Dosing to continue with no changes.     2.5 mg (2.5 mg x 1) every Mon, Thu, Sat; 3.75 mg (2.5 mg x 1.5) all other days     Per protocol OK to go out 1 more week if needed due to recent sub INR.   INR   Date Value Ref Range Status   07/21/2021 2.1  Final     INR (External)   Date Value Ref Range Status   11/07/2023 1.9 (A) 0.9 - 1.1 Final        No questions at this time.     Sanjana Green, RN, BSN, PHN  Anticoagulation Nurse  [phone]

## 2023-11-15 ENCOUNTER — TRANSFERRED RECORDS (OUTPATIENT)
Dept: HEALTH INFORMATION MANAGEMENT | Facility: CLINIC | Age: 83
End: 2023-11-15
Payer: COMMERCIAL

## 2023-11-16 ENCOUNTER — TELEPHONE (OUTPATIENT)
Dept: INTERNAL MEDICINE | Facility: CLINIC | Age: 83
End: 2023-11-16
Payer: COMMERCIAL

## 2023-11-17 NOTE — TELEPHONE ENCOUNTER
Pt has an appt scheduled for tomorrow but she is feeling sick so she's not going to make it. She states that she is having a lot of hair loss, and would like to be seen before the month is out.

## 2023-11-20 ENCOUNTER — ANTICOAGULATION THERAPY VISIT (OUTPATIENT)
Dept: ANTICOAGULATION | Facility: CLINIC | Age: 83
End: 2023-11-20
Payer: COMMERCIAL

## 2023-11-20 ENCOUNTER — TELEPHONE (OUTPATIENT)
Dept: INTERNAL MEDICINE | Facility: CLINIC | Age: 83
End: 2023-11-20

## 2023-11-20 ENCOUNTER — NURSE TRIAGE (OUTPATIENT)
Dept: INTERNAL MEDICINE | Facility: CLINIC | Age: 83
End: 2023-11-20
Payer: COMMERCIAL

## 2023-11-20 DIAGNOSIS — I48.20 CHRONIC ATRIAL FIBRILLATION (H): ICD-10-CM

## 2023-11-20 DIAGNOSIS — I48.21 PERMANENT ATRIAL FIBRILLATION (H): Primary | ICD-10-CM

## 2023-11-20 LAB — INR (EXTERNAL): 4.1 (ref 0.9–1.1)

## 2023-11-20 RX ORDER — WARFARIN SODIUM 2.5 MG/1
TABLET ORAL
Qty: 135 TABLET | Refills: 1 | Status: SHIPPED | OUTPATIENT
Start: 2023-11-20 | End: 2024-02-13

## 2023-11-20 NOTE — PROGRESS NOTES
ANTICOAGULATION MANAGEMENT     Savanna Rehman 83 year old female is on warfarin with supratherapeutic INR result. (Goal INR 2.0-3.0)    Recent labs: (last 7 days)     11/20/23  1348   INR 4.1*       ASSESSMENT     Source(s): Chart Review and Home Care/Facility Nurse     Warfarin doses taken: Warfarin taken as instructed  Diet: Decreased greens/vitamin K in diet; plans to resume previous intake  Medication/supplement changes:  Imodium and tylenol 4 tablets in 7 days  New illness, injury, or hospitalization: Yes: Diarrhea for one week  Signs or symptoms of bleeding or clotting: No  Previous result: Subtherapeutic  Additional findings: Refill needed today. Savanna meets all criteria for refill (current [facility] referral, office visit with referring provider/group in last 1 year unless directed to return in 2 years in last referring provider visit note, lab monitoring up to date or not exceeding 2 weeks overdue). Rx instructions and quantity supplied updated to match patient's current dosing plan. Warfarin 90 day supply with 1 refill granted per [facility] protocol.  Has call out to GI about the on going diarrhea.       PLAN     Recommended plan for temporary change(s) affecting INR     Dosing Instructions: hold dose then continue your current warfarin dose with next INR in 1 week       Summary  As of 11/20/2023      Full warfarin instructions:  11/20: Hold; Otherwise 2.5 mg every Mon, Thu, Sat; 3.75 mg all other days   Next INR check:  11/28/2023               Telephone call with Cole Anthony home care nurse who verbalizes understanding and agrees to plan and who agrees to plan and repeated back plan correctly    Orders given to  Homecare nurse/facility to recheck    Education provided:   Call if GI starts a medication as treatment for the diarrhea.    Plan made per [facility] anticoagulation protocol    Mahsa Scott, [facility]  11/20/2023    _______________________________________________________________________     Anticoagulation Episode Summary       Current INR goal:  2.0-3.0   TTR:  54.2% (1 y)   Target end date:  Indefinite   Send INR reminders to:  NIKKI CHASE    Indications    Permanent atrial fibrillation (H) [I48.21]  Chronic atrial fibrillation (H) [I48.20]  Long term (current) use of anticoagulants (Resolved) [Z79.01]             Comments:  Home care              Anticoagulation Care Providers       Provider Role Specialty Phone number    Patti Suárez MD Referring Internal Medicine [phone]

## 2023-11-20 NOTE — TELEPHONE ENCOUNTER
Patient calling   S-(situation): diarrhea    B-(background): started 2 weeks ago and it stopped.  Restarted on Thursday after going to lunch with friends.  Stated she ate 1/2 of a hamburger and no bun.  Has been taking imodium and stated the medication is not working.     A-(assessment): has been having loose stools for past 2 weeks.  Stated it stopped and then restarted last Thursday after going to lunch with some friends.  Stated that this is not food poisoning.  Has had mild nausea intermittently without vomiting.  Has been sleeping a lot today.  Having mild lower abdominal cramping from hip to hip and denied pain going away after bm.  Has been having mucous in stools, but no blood.  Denied fever, dizziness, dry mouth, new wt loss.  Has taken antibiotics within the past 2 months(per patient)    R-(recommendations): per protocol, patient should be seen today.  Patient unable to go anywhere today due to not having a ride.  Patient has a ride tomorrow as she had an appointment with a provider that she recently canceled.      Scheduled the appointment for tomorrow.  Next 5 appointments (look out 90 days)      Nov 21, 2023 10:30 AM  (Arrive by 10:10 AM)  Provider Visit with BATSHEVA Ann CNP  [facility] ([facility] ) [address]  [address]  [facility] [address] [phone]              Reason for Disposition   MODERATE diarrhea (e.g., 4-6 times / day more than normal) and present > 48 hours (2 days)    Additional Information   Negative: Shock suspected (e.g., cold/pale/clammy skin, too weak to stand, low BP, rapid pulse)   Negative: Difficult to awaken or acting confused (e.g., disoriented, slurred speech)   Negative: Sounds like a life-threatening emergency to the triager   Negative: Vomiting also present and worse than the diarrhea   Negative: Blood in stool and without diarrhea   Negative: SEVERE abdominal pain (e.g., excruciating) and  "present > 1 hour   Negative: SEVERE abdominal pain and age > 60 years   Negative: Bloody, black, or tarry bowel movements  (Exception: Chronic-unchanged black-grey bowel movements and is taking iron pills or Pepto-Bismol.)   Negative: SEVERE diarrhea (e.g., 7 or more times / day more than normal) and age > 60 years   Negative: Constant abdominal pain lasting > 2 hours   Negative: Drinking very little and dehydration suspected (e.g., no urine > 12 hours, very dry mouth, very lightheaded)   Negative: Patient sounds very sick or weak to the triager    Answer Assessment - Initial Assessment Questions  1. DIARRHEA SEVERITY: \"How bad is the diarrhea?\" \"How many more stools have you had in the past 24 hours than normal?\"     - NO DIARRHEA (SCALE 0)    - MILD (SCALE 1-3): Few loose or mushy BMs; increase of 1-3 stools over normal daily number of stools; mild increase in ostomy output.    -  MODERATE (SCALE 4-7): Increase of 4-6 stools daily over normal; moderate increase in ostomy output.    -  SEVERE (SCALE 8-10; OR \"WORST POSSIBLE\"): Increase of 7 or more stools daily over normal; moderate increase in ostomy output; incontinence.      4 episodes today  2. ONSET: \"When did the diarrhea begin?\"       2 weeks ago  3. BM CONSISTENCY: \"How loose or watery is the diarrhea?\"       Consistency of cream sauce  4. VOMITING: \"Are you also vomiting?\" If Yes, ask: \"How many times in the past 24 hours?\"       No, but having nausea  5. ABDOMEN PAIN: \"Are you having any abdomen pain?\" If Yes, ask: \"What does it feel like?\" (e.g., crampy, dull, intermittent, constant)       Yes-crampy  6. ABDOMEN PAIN SEVERITY: If present, ask: \"How bad is the pain?\"  (e.g., Scale 1-10; mild, moderate, or severe)    - MILD (1-3): doesn't interfere with normal activities, abdomen soft and not tender to touch     - MODERATE (4-7): interferes with normal activities or awakens from sleep, abdomen tender to touch     - SEVERE (8-10): excruciating pain, doubled " "over, unable to do any normal activities        Mild, like menstrual cramps  7. ORAL INTAKE: If vomiting, \"Have you been able to drink liquids?\" \"How much liquids have you had in the past 24 hours?\"      Yes, is trying to stay hydrated, but has slept most of the day today.    8. HYDRATION: \"Any signs of dehydration?\" (e.g., dry mouth [not just dry lips], too weak to stand, dizziness, new weight loss) \"When did you last urinate?\"      Last void was about an hour ago.  Was a normal amount  9. EXPOSURE: \"Have you traveled to a foreign country recently?\" \"Have you been exposed to anyone with diarrhea?\" \"Could you have eaten any food that was spoiled?\"     No travel, no spoiled food  10. ANTIBIOTIC USE: \"Are you taking antibiotics now or have you taken antibiotics in the past 2 months?\"        yes  11. OTHER SYMPTOMS: \"Do you have any other symptoms?\" (e.g., fever, blood in stool)        Mucous in her stool, nausea  12. PREGNANCY: \"Is there any chance you are pregnant?\" \"When was your last menstrual period?\"        no    Protocols used: Diarrhea-A-[place]    "

## 2023-11-20 NOTE — TELEPHONE ENCOUNTER
Message received via fax from pharmacy stating the indapamide is not helping with the extra fluid. Patient wanted them to ask us if there is an alternative.

## 2023-11-21 ENCOUNTER — OFFICE VISIT (OUTPATIENT)
Dept: INTERNAL MEDICINE | Facility: CLINIC | Age: 83
End: 2023-11-21
Payer: COMMERCIAL

## 2023-11-21 VITALS
HEIGHT: 68 IN | DIASTOLIC BLOOD PRESSURE: 68 MMHG | SYSTOLIC BLOOD PRESSURE: 118 MMHG | TEMPERATURE: 98.1 F | OXYGEN SATURATION: 91 % | WEIGHT: 238.7 LBS | HEART RATE: 102 BPM | RESPIRATION RATE: 18 BRPM | BODY MASS INDEX: 36.18 KG/M2

## 2023-11-21 DIAGNOSIS — R19.7 DIARRHEA OF PRESUMED INFECTIOUS ORIGIN: Primary | ICD-10-CM

## 2023-11-21 DIAGNOSIS — R19.5 ABNORMAL STOOL COLOR: ICD-10-CM

## 2023-11-21 PROCEDURE — 99213 OFFICE O/P EST LOW 20 MIN: CPT

## 2023-11-21 ASSESSMENT — PAIN SCALES - GENERAL: PAINLEVEL: NO PAIN (0)

## 2023-11-21 NOTE — TELEPHONE ENCOUNTER
Called and left patient a voice mail message on November 21, 2023 9:50 AM to call back the clinic to triage/clarify patient's message of med not working. Please refer patient's call back to triage RN.     Thank you,  Dawood Ortez, Triage RN [facility]  9:50 AM 11/21/2023

## 2023-11-21 NOTE — TELEPHONE ENCOUNTER
Patient is currently in clinic waiting to be seen at 11:30. Patient arrived late . Provider agreed to see her after the 11:00 patient.

## 2023-11-21 NOTE — PROGRESS NOTES
"  Assessment & Plan     (R19.7) Diarrhea of presumed infectious origin  (primary encounter diagnosis)  Comment: Patient has a history of persistent diarrhea and is followed by GI closely. She did just have an appointment with GI virtually last week. They osei labs and did a stool culture at that time. However, the patient has been on imodium. Advised the patient to be off of imodium for a t least 3 days before providing a stool culture.   Plan: Enteric Bacteria and Virus Panel by JORD[facility] Stool,         C. difficile Toxin B PCR with reflex to C.         difficile Antigen and Toxins A/B EIA        Stool cultures ordered. If all normal, would advise the patient to follow-up with GI.     (R19.5) Abnormal stool color  Comment: please see above  Plan: Patients bilirubin and ALP are both elevated.  Encouraged the patient to drink more water during the day and to follow-up with GI if her stool cultures are normal.      20 minutes spent by me on the date of the encounter doing chart review, review of test results, interpretation of tests, patient visit, and documentation        BMI:   Estimated body mass index is 36.29 kg/m  as calculated from the following:    Height as of this encounter: 1.727 m (5' 8\").    Weight as of this encounter: 108.3 kg (238 lb 11.2 oz).   Weight management plan: Patient was referred to their PCP to discuss a diet and exercise plan.        BATSHEVA Ann CNP  [facility] SHELLY Corrales is a 83 year old, presenting for the following health issues:  Patient complains of diarrhea for 2 weeks, has been taking imodium.    Patient has been dealing with diarrhea for about 3 weeks. It got back to normal but on Thursday she went out to dinner with friends for a birthday. The only think that was unusual, they brought a dessert that had chocolate on top. She had two bites of chocolate.   She did bring a stool culture in with her.   Fever: denies  Red/Black stools: denies- " yellow and watery. Even sips of water, right after that.   Vomiting: denies  Antibiotic use: quite awhile ago.   Unintentional weight loss: denies  Terrible abdominal pain from hip bone to hip bone.     She has been on imodium- took imodium last night.       11/21/2023    10:42 AM   Additional Questions   Roomed by Lisseth Mckeon       HPI               Review of Systems   Constitutional, HEENT, cardiovascular, pulmonary, gi and gu systems are negative, except as otherwise noted.      Objective    LMP  (LMP Unknown)   There is no height or weight on file to calculate BMI.  Physical Exam   GENERAL: healthy, alert and no distress  NECK: no adenopathy, no asymmetry, masses, or scars and thyroid normal to palpation  RESP: lungs clear to auscultation - no rales, rhonchi or wheezes  CV: regular rate and rhythm, normal S1 S2, no S3 or S4, no murmur, click or rub, no peripheral edema and peripheral pulses strong  ABDOMEN: soft, nontender, no hepatosplenomegaly, no masses and bowel sounds normal  MS: no gross musculoskeletal defects noted, no edema

## 2023-11-27 ENCOUNTER — TELEPHONE (OUTPATIENT)
Dept: INTERNAL MEDICINE | Facility: CLINIC | Age: 83
End: 2023-11-27
Payer: COMMERCIAL

## 2023-11-27 NOTE — TELEPHONE ENCOUNTER
Patient calling. She states she is on a diuretic  which has caused her edema to become worse. Patient wants to know what she can do for her edema  Please advise  Ok to call and ishmael [phone]

## 2023-11-28 ENCOUNTER — ANTICOAGULATION THERAPY VISIT (OUTPATIENT)
Dept: ANTICOAGULATION | Facility: CLINIC | Age: 83
End: 2023-11-28
Payer: COMMERCIAL

## 2023-11-28 DIAGNOSIS — I48.21 PERMANENT ATRIAL FIBRILLATION (H): Primary | ICD-10-CM

## 2023-11-28 DIAGNOSIS — I48.20 CHRONIC ATRIAL FIBRILLATION (H): ICD-10-CM

## 2023-11-28 LAB — INR (EXTERNAL): 4.7 (ref 0.9–1.1)

## 2023-11-28 NOTE — TELEPHONE ENCOUNTER
Patient is checking status of previous message left yesterday and would like for a nurse to return her call today. [phone] Thank you.

## 2023-11-28 NOTE — TELEPHONE ENCOUNTER
"Weight gain of 8 lb. In past 2 weeks.  Feet and ankles tight and swollen to mid calf, pitting edema. No difficulty breathing. No chest pain.  Advised she should be seen by primary care provider, patient states she cannot come in Wed or Thurs this week.  Can come in Friday or any day next week except Tues. 12/5/23.  When can we schedule her?    She has also had \"severe\" diarrhea and \"severe\" abdominal pain for about 2 weeks, 2-8 stools per day.  She is using 2 imodium first thing in the morning and 1 after each stool. GI provider advised patient to go to UC today but patient declines.  GI has ordered an abdominal xray to be done tomorrow. BENTIA Graham R.N.    "

## 2023-11-28 NOTE — PROGRESS NOTES
ANTICOAGULATION MANAGEMENT     Savanna Rehman 83 year old female is on warfarin with supratherapeutic INR result. (Goal INR 2.0-3.0)    Recent labs: (last 7 days)     11/28/23  1308   INR 4.7*       ASSESSMENT     Source(s): Chart Review and Home Care/Facility Nurse     Warfarin doses taken: Warfarin taken as instructed  Diet:  decreased over all  Medication/supplement changes: None noted  New illness, injury, or hospitalization: Yes: vomiting and diarrhea is 5-9 times per day. A little less then that today.    Signs or symptoms of bleeding or clotting: No  Previous result: Supratherapeutic  Additional findings: None       PLAN     Recommended plan for ongoing change(s) affecting INR     Dosing Instructions: hold dose then decrease your warfarin dose (11.1% change) with next INR in 1 week       Summary  As of 11/28/2023      Full warfarin instructions:  11/28: Hold; Otherwise 3.75 mg every Tue, Fri; 2.5 mg all other days   Next INR check:  12/5/2023               Telephone call with Cole Anthony home care nurse who agrees to plan and repeated back plan correctly    Orders given to  Homecare nurse/facility to recheck    Education provided:   None required    Plan made per [facility] anticoagulation protocol    Mahsa Scott, RN  Anticoagulation Clinic  11/28/2023    _______________________________________________________________________     Anticoagulation Episode Summary       Current INR goal:  2.0-3.0   TTR:  53.6% (1 y)   Target end date:  Indefinite   Send INR reminders to:  NIKKI CHASE    Indications    Permanent atrial fibrillation (H) [I48.21]  Chronic atrial fibrillation (H) [I48.20]  Long term (current) use of anticoagulants (Resolved) [Z79.01]             Comments:  Home care              Anticoagulation Care Providers       Provider Role Specialty Phone number    Patti Suárez MD Referring Internal Medicine [phone]

## 2023-11-28 NOTE — TELEPHONE ENCOUNTER
"Patient complains of edema in feet on and off \"for a long time\" but is unable to say how long-- months or years. Swelling goes midway up the calves bilaterally.    Patient abruptly ended the conversation saying she had to take her scooter down and meet someone right now.  Asks that she be called back later. BENITA Graham R.N.     "

## 2023-11-29 ENCOUNTER — TELEPHONE (OUTPATIENT)
Dept: INTERNAL MEDICINE | Facility: CLINIC | Age: 83
End: 2023-11-29

## 2023-11-29 ENCOUNTER — ANCILLARY PROCEDURE (OUTPATIENT)
Dept: GENERAL RADIOLOGY | Facility: CLINIC | Age: 83
End: 2023-11-29
Attending: INTERNAL MEDICINE
Payer: COMMERCIAL

## 2023-11-29 DIAGNOSIS — R19.7 DIARRHEA, UNSPECIFIED: ICD-10-CM

## 2023-11-29 PROCEDURE — 74019 RADEX ABDOMEN 2 VIEWS: CPT | Mod: TC | Performed by: RADIOLOGY

## 2023-11-29 NOTE — TELEPHONE ENCOUNTER
Test Results    Contacts         Type Contact Phone/Fax    11/29/2023 03:14 PM CST Phone (Incoming) Savanna Rehman (Self) [phone] (M)            Who ordered the test:  [facility]AMA     Type of test: Lab    Date of test:  Unknown    Where was the test performed:  Unknown    What are your questions/concerns?:  Pt wants someone to call her to go after results.     Could we send this information to you in [facility] or would you prefer to receive a phone call?:   Patient would prefer a phone call   Okay to leave a detailed message?: No at Cell number on file:    Telephone Information:   Mobile [phone]

## 2023-11-30 ENCOUNTER — TRANSFERRED RECORDS (OUTPATIENT)
Dept: HEALTH INFORMATION MANAGEMENT | Facility: CLINIC | Age: 83
End: 2023-11-30
Payer: COMMERCIAL

## 2023-11-30 NOTE — TELEPHONE ENCOUNTER
Patient advised she needs to get the results of yesterdays abdominal film from GI who ordered it.  Patient states she now was able able to access My Chart, saw the results and will discuss at GI appt next week. BENITA Graham R.N.

## 2023-11-30 NOTE — TELEPHONE ENCOUNTER
Patient asks again when primary care provider can see her.  She is not available 12/4 and 12/5 as she has other medical appts. BENITA Graham R.N.

## 2023-12-01 DIAGNOSIS — R60.0 BILATERAL LEG EDEMA: ICD-10-CM

## 2023-12-01 RX ORDER — INDAPAMIDE 1.25 MG/1
1.25 TABLET ORAL EVERY MORNING
Qty: 30 TABLET | Refills: 3 | Status: SHIPPED | OUTPATIENT
Start: 2023-12-01 | End: 2024-02-13

## 2023-12-04 ENCOUNTER — TRANSFERRED RECORDS (OUTPATIENT)
Dept: HEALTH INFORMATION MANAGEMENT | Facility: CLINIC | Age: 83
End: 2023-12-04
Payer: COMMERCIAL

## 2023-12-04 LAB
ALT SERPL-CCNC: 16 IU/L (ref 0–32)
AST SERPL-CCNC: 36 IU/L (ref 0–40)
CREATININE (EXTERNAL): 1.05 MG/DL (ref 0.57–1)
GFR ESTIMATED (EXTERNAL): 53 ML/MIN/1.73
INR (EXTERNAL): 3 (ref 0.9–1.1)

## 2023-12-05 NOTE — TELEPHONE ENCOUNTER
Edema can be related with her liver disease.  I talked to dr Dia at [facility].   Dr Dia advised the patient many times for appointment with [facility], but the patient hasn't made any appointment     She needs to schedule quang sultana MNDAINA

## 2023-12-06 ENCOUNTER — ANTICOAGULATION THERAPY VISIT (OUTPATIENT)
Dept: ANTICOAGULATION | Facility: CLINIC | Age: 83
End: 2023-12-06
Payer: COMMERCIAL

## 2023-12-06 DIAGNOSIS — I48.21 PERMANENT ATRIAL FIBRILLATION (H): Primary | ICD-10-CM

## 2023-12-06 DIAGNOSIS — I48.20 CHRONIC ATRIAL FIBRILLATION (H): ICD-10-CM

## 2023-12-07 NOTE — PROGRESS NOTES
Received call from JENNA Waters RN who is looking for an update on anticoagulation plan. Per chart review, patient had an INR done on 12/04/2023 at [facility] and the result was 3.0.    ANTICOAGULATION MANAGEMENT     Savanna Rehman 83 year old female is on warfarin with therapeutic INR result. (Goal INR 2.0-3.0)    Recent labs: (last 7 days)     12/04/23  1523   INR 3.0*       ASSESSMENT     Source(s): Chart Review and Patient/Caregiver Call     Warfarin doses taken: Warfarin taken as instructed  Diet: No new diet changes identified  Medication/supplement changes: None noted  New illness, injury, or hospitalization: Diarrhea has improved  Signs or symptoms of bleeding or clotting: No  Previous result: Supratherapeutic  Additional findings: None       PLAN     Recommended plan for temporary change(s) affecting INR     Dosing Instructions: Continue your current warfarin dose with next INR in 4 days    Summary  As of 12/6/2023      Full warfarin instructions:  3.75 mg every Tue, Fri; 2.5 mg all other days   Next INR check:  12/11/2023               Telephone call with Savanna who verbalizes understanding and agrees to plan    Orders given to  Homecare nurse/facility to recheck. Recheck date given to [facility]IFF[facility] Waters.    Education provided:   Goal range and lab monitoring: goal range and significance of current result    Plan made per [facility] anticoagulation protocol    Miko West RN  Anticoagulation Clinic  12/7/2023    _______________________________________________________________________     Anticoagulation Episode Summary       Current INR goal:  2.0-3.0   TTR:  53.9% (1 y)   Target end date:  Indefinite   Send INR reminders to:  [facility]    Indications    Permanent atrial fibrillation (H) [I48.21]  Chronic atrial fibrillation (H) [I48.20]  Long term (current) use of anticoagulants (Resolved) [Z79.01]             Comments:  Home care              Anticoagulation Care Providers       Provider Role Specialty Phone  number    Patti Suárez MD Referring Internal Medicine [phone]

## 2023-12-10 ENCOUNTER — HEALTH MAINTENANCE LETTER (OUTPATIENT)
Age: 83
End: 2023-12-10

## 2023-12-12 ENCOUNTER — ANTICOAGULATION THERAPY VISIT (OUTPATIENT)
Dept: ANTICOAGULATION | Facility: CLINIC | Age: 83
End: 2023-12-12
Payer: COMMERCIAL

## 2023-12-12 DIAGNOSIS — I48.20 CHRONIC ATRIAL FIBRILLATION (H): ICD-10-CM

## 2023-12-12 DIAGNOSIS — I48.21 PERMANENT ATRIAL FIBRILLATION (H): Primary | ICD-10-CM

## 2023-12-12 LAB — INR (EXTERNAL): 3.4 (ref 0.9–1.1)

## 2023-12-12 NOTE — PROGRESS NOTES
ANTICOAGULATION MANAGEMENT     Savanna Rehman 83 year old female is on warfarin with supratherapeutic INR result. (Goal INR 2.0-3.0)    Recent labs: (last 7 days)     12/12/23  1255   INR 3.4*       ASSESSMENT     Source(s): Chart Review and Home Care/Facility Nurse     Warfarin doses taken: More warfarin taken than planned which may be affecting INR  Diet: No new diet changes identified  Medication/supplement changes: None noted  New illness, injury, or hospitalization: No  Signs or symptoms of bleeding or clotting: No  Previous result: Therapeutic last visit; previously outside of goal range  Additional findings: None       PLAN     Recommended plan for temporary change(s) affecting INR     Dosing Instructions: partial hold then continue your current warfarin dose with next INR in 1 week       Summary  As of 12/12/2023      Full warfarin instructions:  12/12: 2.5 mg; Otherwise 3.75 mg every Tue, Fri; 2.5 mg all other days   Next INR check:  12/19/2023               Telephone call with [facility] home care nurse who agrees to plan and repeated back plan correctly    Orders given to  Homecare nurse/facility to recheck    Education provided:   Taking warfarin: importance of following [facility] instructions vs instructions on the prescription bottle    Plan made per [facility] anticoagulation protocol    Roya Sky RN  Anticoagulation Clinic  12/12/2023    _______________________________________________________________________     Anticoagulation Episode Summary       Current INR goal:  2.0-3.0   TTR:  53.5% (1 y)   Target end date:  Indefinite   Send INR reminders to:  NIKKI CHASE    Indications    Permanent atrial fibrillation (H) [I48.21]  Chronic atrial fibrillation (H) [I48.20]  Long term (current) use of anticoagulants (Resolved) [Z79.01]             Comments:  Home care              Anticoagulation Care Providers       Provider Role Specialty Phone number    Patti Suárez MD Referring Internal  Medicine [phone]

## 2023-12-14 ENCOUNTER — LAB (OUTPATIENT)
Dept: LAB | Facility: CLINIC | Age: 83
End: 2023-12-14
Payer: COMMERCIAL

## 2023-12-14 ENCOUNTER — NURSE TRIAGE (OUTPATIENT)
Dept: NURSING | Facility: CLINIC | Age: 83
End: 2023-12-14

## 2023-12-14 DIAGNOSIS — N18.30 CHRONIC KIDNEY DISEASE, STAGE 3 (H): Primary | ICD-10-CM

## 2023-12-14 DIAGNOSIS — E11.9 DIABETES MELLITUS, TYPE 2 (H): ICD-10-CM

## 2023-12-14 DIAGNOSIS — R19.7 DIARRHEA OF PRESUMED INFECTIOUS ORIGIN: ICD-10-CM

## 2023-12-14 LAB

## 2023-12-14 PROCEDURE — 87338 HPYLORI STOOL AG IA: CPT

## 2023-12-14 PROCEDURE — 82653 EL-1 FECAL QUANTITATIVE: CPT

## 2023-12-14 PROCEDURE — 87507 IADNA-DNA/RNA PROBE TQ 12-25: CPT | Mod: GZ

## 2023-12-14 PROCEDURE — 87493 C DIFF AMPLIFIED PROBE: CPT | Mod: 59

## 2023-12-15 ENCOUNTER — DOCUMENTATION ONLY (OUTPATIENT)
Dept: ANTICOAGULATION | Facility: CLINIC | Age: 83
End: 2023-12-15
Payer: COMMERCIAL

## 2023-12-15 ENCOUNTER — NURSE TRIAGE (OUTPATIENT)
Dept: NURSING | Facility: CLINIC | Age: 83
End: 2023-12-15
Payer: COMMERCIAL

## 2023-12-15 ENCOUNTER — TELEPHONE (OUTPATIENT)
Dept: INTERNAL MEDICINE | Facility: CLINIC | Age: 83
End: 2023-12-15
Payer: COMMERCIAL

## 2023-12-15 DIAGNOSIS — R42 DIZZINESS: ICD-10-CM

## 2023-12-15 DIAGNOSIS — A05.8 YERSINIA ENTEROCOLITICA FOOD POISONING: Primary | ICD-10-CM

## 2023-12-15 LAB
C DIFF TOX B STL QL: NEGATIVE
ELASTASE PANC STL-MCNT: 32.5 UG/G
H PYLORI AG STL QL IA: NEGATIVE

## 2023-12-15 RX ORDER — MECLIZINE HYDROCHLORIDE 25 MG/1
25 TABLET ORAL 3 TIMES DAILY PRN
Qty: 15 TABLET | Refills: 0 | Status: SHIPPED | OUTPATIENT
Start: 2023-12-15 | End: 2023-12-20

## 2023-12-15 RX ORDER — SULFAMETHOXAZOLE/TRIMETHOPRIM 800-160 MG
1 TABLET ORAL 2 TIMES DAILY
Qty: 10 TABLET | Refills: 0 | Status: SHIPPED | OUTPATIENT
Start: 2023-12-15 | End: 2023-12-20

## 2023-12-15 NOTE — TELEPHONE ENCOUNTER
Pt was seen by Erin Stern for diarrhea and stool test ordered by Leena, please forward to provider who ordered the tests

## 2023-12-15 NOTE — TELEPHONE ENCOUNTER
Pt calling about stool labs, states she got a message stating she have labs available. Unable to get into her Mychart to view.    Per chart review, labs are still pending. Advise pt, labs may take up to 48 hours or longer to come back, if results abnormal, pt will be notified.   Offer triage, pt declines, states this has been an on going problem, just need to know cause of it.  Advise call back if symptoms worsen or have other questions/concerns.      Reji Celis RN, BSN  12/14/2023 at 9:42 PM  [facility]      Reason for Disposition   Caller requesting lab results  (Exception: Routine or non-urgent lab result.)    Protocols used: PCP Call - No Triage-A-

## 2023-12-15 NOTE — TELEPHONE ENCOUNTER
Hawk at [facility] was called and informed that per patient request, the Rx was being cancelled

## 2023-12-15 NOTE — TELEPHONE ENCOUNTER
I had consulted with [facility] that evaluated her case at length. She has an allergy to bactrim of dizziness. Due to that, we feel to treat the infection present in her stool, she should take the bactrim and have meclizine on hand if she gets dizzy. If she would like to go through her other providers for management, she is welcome to do that.

## 2023-12-15 NOTE — TELEPHONE ENCOUNTER
Hawk with [facility] calls. Patient reports she has an allergy to the sulfamethoxazole-trimethoprim (BACTRIM DS) 800-160 MG tablet . A new script can be sent or the provider can call [facility]           [facility]   Telephone Fax   [phone]

## 2023-12-15 NOTE — TELEPHONE ENCOUNTER
See nurse triage encounter 12/15/23 - patient only gets antibiotics from her infectious disease provider - Dr. Granados.  Patient states the antibiotic prescription sent today can be cancelled.

## 2023-12-15 NOTE — TELEPHONE ENCOUNTER
Pt is calling.    Calling regarding her results: Enteric Bacteria and virus panel.  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 Results not reviewed by provider yet.  Still has diarrhea.  Bloating, abdominal pain that comes and goes.  Is seeing GI and has CT scan on Wednesday coming up.    I advised her that I would route a message to her PCP, to have her review the results and discuss any treatment options if needed.     Pt would like call back in the AM, Friday.    Dary Baugh RN  [facility] Nurse Advisor Supervisor  12/15/2023 at 1:34 AM        Reason for Disposition   Caller requesting lab results  (Exception: Routine or non-urgent lab result.)    Protocols used: PCP Call - No Triage-A-

## 2023-12-18 ENCOUNTER — TRANSFERRED RECORDS (OUTPATIENT)
Dept: HEALTH INFORMATION MANAGEMENT | Facility: CLINIC | Age: 83
End: 2023-12-18

## 2023-12-18 DIAGNOSIS — Z53.9 DIAGNOSIS NOT YET DEFINED: Primary | ICD-10-CM

## 2023-12-18 PROCEDURE — G0179 MD RECERTIFICATION HHA PT: HCPCS | Performed by: INTERNAL MEDICINE

## 2023-12-19 ENCOUNTER — NURSE TRIAGE (OUTPATIENT)
Dept: NURSING | Facility: CLINIC | Age: 83
End: 2023-12-19
Payer: COMMERCIAL

## 2023-12-19 ENCOUNTER — TELEPHONE (OUTPATIENT)
Dept: ANTICOAGULATION | Facility: CLINIC | Age: 83
End: 2023-12-19
Payer: COMMERCIAL

## 2023-12-19 NOTE — TELEPHONE ENCOUNTER
Patient wanting to pass on message to her PCP. Patient was seen at [facility] by Dr. 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     Reason for Disposition   General information question, no triage required and triager able to answer question    Protocols used: Information Only Call - No Triage-A-[place]

## 2023-12-19 NOTE — TELEPHONE ENCOUNTER
"Home care nurse Rosa left a message on the home care line the patient cancelled her appointment for today to check her INR to come back next week.      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  Patient was prescribed Bactrim by covering provider and patient called [facility] to cancel the Rx stating in a TE from 12/15/23 she only gets antibiotics from ID provider.  Home care nurse has attempted to text patient to get more information regarding her \"bacterial infection\" but has not returned any of the home care nurses' messages.    Advised home care patient to continue current dosing of 3.75 mg Tues/Fri and 2.5 mg all the other days of the week.    Home care will attempt next week to check the patient's INR.    EMILY Johnson, RN  Anticoagulation Clinic    "

## 2023-12-19 NOTE — TELEPHONE ENCOUNTER
Noted. Will route to primary care provider as FYI as well.    Thank you,  Dawood Ortez, Triage RN [facility]  2:56 PM 12/19/2023

## 2023-12-20 ENCOUNTER — TELEPHONE (OUTPATIENT)
Dept: INTERNAL MEDICINE | Facility: CLINIC | Age: 83
End: 2023-12-20
Payer: COMMERCIAL

## 2023-12-20 DIAGNOSIS — R60.0 BILATERAL LEG EDEMA: ICD-10-CM

## 2023-12-21 ENCOUNTER — TRANSFERRED RECORDS (OUTPATIENT)
Dept: HEALTH INFORMATION MANAGEMENT | Facility: CLINIC | Age: 83
End: 2023-12-21
Payer: COMMERCIAL

## 2023-12-21 NOTE — TELEPHONE ENCOUNTER
"Patient calling back inquiring about her message below.    Asking for a diuretic only (\"no blood pressure medication in the diuretic\").  Reports she does not need a blood pressure medication - her blood pressure is 103/68.    Reports increase 8 lb weight gain in the last 2 wks and has A fib.    Please advise, thanks.  "

## 2023-12-21 NOTE — TELEPHONE ENCOUNTER
Patient calling stating she would like to have the lasix ordered.      Please review message below regarding her 8 lb weight gain.

## 2023-12-26 ENCOUNTER — ANTICOAGULATION THERAPY VISIT (OUTPATIENT)
Dept: ANTICOAGULATION | Facility: CLINIC | Age: 83
End: 2023-12-26
Payer: COMMERCIAL

## 2023-12-26 DIAGNOSIS — I48.20 CHRONIC ATRIAL FIBRILLATION (H): ICD-10-CM

## 2023-12-26 DIAGNOSIS — I48.21 PERMANENT ATRIAL FIBRILLATION (H): Primary | ICD-10-CM

## 2023-12-26 LAB — INR (EXTERNAL): 1.6 (ref 0.9–1.1)

## 2023-12-26 NOTE — PROGRESS NOTES
ANTICOAGULATION MANAGEMENT     Savanna Rehman 83 year old female is on warfarin with subtherapeutic INR result. (Goal INR 2.0-3.0)    Recent labs: (last 7 days)     12/26/23  1237   INR 1.6*       ASSESSMENT     Source(s): Chart Review and Home Care/Facility Nurse     Warfarin doses taken: Warfarin taken as instructed  Diet: No new diet changes identified  Medication/supplement changes:  finished doxycyline-5 day course  New illness, injury, or hospitalization: No  Signs or symptoms of bleeding or clotting: No  Previous result: Supratherapeutic  Additional findings: None       PLAN     Recommended plan for no diet, medication or health factor changes affecting INR     Dosing Instructions: booster dose then Increase your warfarin dose (12.5% change) with next INR in 1 week       Summary  As of 12/26/2023      Full warfarin instructions:  12/26: 5 mg; Otherwise 2.5 mg every Mon, Wed, Fri; 3.75 mg all other days   Next INR check:  1/2/2024               Telephone call with [facility] home care nurse who agrees to plan and repeated back plan correctly    Orders given to  Homecare nurse/facility to recheck    Education provided:   Please call back if any changes to your diet, medications or how you've been taking warfarin    Plan made per [facility] anticoagulation protocol    Roya Sky, RN  Anticoagulation Clinic  12/26/2023    _______________________________________________________________________     Anticoagulation Episode Summary       Current INR goal:  2.0-3.0   TTR:  54.5% (1 y)   Target end date:  Indefinite   Send INR reminders to:  NIKKI CHASE    Indications    Permanent atrial fibrillation (H) [I48.21]  Chronic atrial fibrillation (H) [I48.20]  Long term (current) use of anticoagulants (Resolved) [Z79.01]             Comments:  Home care              Anticoagulation Care Providers       Provider Role Specialty Phone number    Patti Suárez MD Referring Internal Medicine [phone]

## 2024-01-02 ENCOUNTER — ANTICOAGULATION THERAPY VISIT (OUTPATIENT)
Dept: ANTICOAGULATION | Facility: CLINIC | Age: 84
End: 2024-01-02
Payer: COMMERCIAL

## 2024-01-02 DIAGNOSIS — I48.21 PERMANENT ATRIAL FIBRILLATION (H): Primary | ICD-10-CM

## 2024-01-02 DIAGNOSIS — I48.20 CHRONIC ATRIAL FIBRILLATION (H): ICD-10-CM

## 2024-01-02 LAB — INR (EXTERNAL): 1.8 (ref 0.9–1.1)

## 2024-01-02 NOTE — PROGRESS NOTES
ANTICOAGULATION MANAGEMENT     Savanna Rehman 83 year old female is on warfarin with subtherapeutic INR result. (Goal INR 2.0-3.0)    Recent labs: (last 7 days)     01/02/24  0000   INR 1.8*       ASSESSMENT     Source(s): Chart Review and Home Care/Facility Nurse Cole Anthony [phone]    Warfarin doses taken: Warfarin taken as instructed  Diet: No new diet changes identified  Medication/supplement changes: None noted  New illness, injury, or hospitalization: No  Signs or symptoms of bleeding or clotting: No  Previous result: Subtherapeutic  Additional findings: None       PLAN     Recommended plan for no diet, medication or health factor changes affecting INR     Dosing Instructions: Increase your warfarin dose (5.6% change) with next INR in 1 week       Summary  As of 1/2/2024      Full warfarin instructions:  2.5 mg every Mon, Fri; 3.75 mg all other days   Next INR check:  1/9/2024               Telephone call with Savanna who verbalizes understanding and agrees to plan    Orders given to  Homecare nurse/facility to recheck    Education provided:   Please call back if any changes to your diet, medications or how you've been taking warfarin  Goal range and lab monitoring: goal range and significance of current result, Importance of therapeutic range, and Importance of following up at instructed interval  Symptom monitoring: monitoring for clotting signs and symptoms and monitoring for stroke signs and symptoms    Plan made per [facility] anticoagulation protocol    Dottie Villalta, RN  Anticoagulation Clinic  1/2/2024    _______________________________________________________________________     Anticoagulation Episode Summary       Current INR goal:  2.0-3.0   TTR:  53.9% (1 y)   Target end date:  Indefinite   Send INR reminders to:  NIKKI CHASE    Indications    Permanent atrial fibrillation (H) [I48.21]  Chronic atrial fibrillation (H) [I48.20]  Long term (current) use of anticoagulants (Resolved)  [Z79.01]             Comments:  Home care              Anticoagulation Care Providers       Provider Role Specialty Phone number    Patti Suárez MD Referring Internal Medicine [phone]

## 2024-01-03 ENCOUNTER — TELEPHONE (OUTPATIENT)
Dept: INTERNAL MEDICINE | Facility: CLINIC | Age: 84
End: 2024-01-03

## 2024-01-03 RX ORDER — INDAPAMIDE 1.25 MG/1
1.25 TABLET ORAL EVERY MORNING
Qty: 30 TABLET | Refills: 3 | OUTPATIENT
Start: 2024-01-03

## 2024-01-03 NOTE — TELEPHONE ENCOUNTER
Patient calls asking for an alternative to Cymbalta.    Cymbalta was started 10/17/23 and patient states since then she has been more tired.      She explains she was on Zoloft but that gave her tremors    She takes the Cymbalta at 1230 am   Prior to starting Cymbalta she would wake up @ 7-8 am now waking up at 930-10am    She feels too tired to exercise.   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      Patient is willing to have an appointment if needed.     Tuesday does not work well due to home health appointment @ 12

## 2024-01-03 NOTE — TELEPHONE ENCOUNTER
Called pharmacy to give verbal to refuse medication due to patient needing appointment.  Left message on provider line informing of the refusal

## 2024-01-04 ENCOUNTER — TELEPHONE (OUTPATIENT)
Dept: INTERNAL MEDICINE | Facility: CLINIC | Age: 84
End: 2024-01-04
Payer: COMMERCIAL

## 2024-01-04 DIAGNOSIS — F41.9 ANXIETY AND DEPRESSION: Primary | ICD-10-CM

## 2024-01-04 DIAGNOSIS — F32.A ANXIETY AND DEPRESSION: Primary | ICD-10-CM

## 2024-01-04 NOTE — TELEPHONE ENCOUNTER
Patient calls.     Has multiple questions/concerns.     Has Afib - on warfarin. States her brother was just diagnosed with Afib and he going get cardioversion. Patient asks if this is an option for her?  Lost and gained back 8lbs of water weight. In 2023 lost 60 lbs. Wants to know if she is a candidate.     2. Doesn't have anyone following her for sleep apnea. On CPAP and oxygen. Does she need referral?    3. On vitamin D - what dose should she be taking? Has been taking 5,000 units daily, previous result note states can take 2,000 units daily?    4. Should she be taking multivitamin?      Routing to provider to review and advise.     Asya Gramajo RN  [facility]

## 2024-01-04 NOTE — TELEPHONE ENCOUNTER
Patient calling to check on response.     Has stopped Cymbalta 8 days ago.     Routing to provider to review and advise.     Asya Gramajo RN  [facility]

## 2024-01-05 ENCOUNTER — TELEPHONE (OUTPATIENT)
Dept: INTERNAL MEDICINE | Facility: CLINIC | Age: 84
End: 2024-01-05
Payer: COMMERCIAL

## 2024-01-05 NOTE — TELEPHONE ENCOUNTER
Pt calls again, she took Zoloft and had side effects.   Then started Duloxetine 20 mg. Made her tired, very sleepy all day long. Would sleep sitting up.   Stopped this.     She is asking about A1C check?     Informed her that she will probably need a Telephone visit or Office visit to answer many of her questions.     She states it takes too long to get an appointment with Dr Lauren. Please advise if can be added on.

## 2024-01-08 NOTE — TELEPHONE ENCOUNTER
Patient calling for updates on previous questions. Advised patient of mental health referral and order for A1C on file. Patient does not wish to come to clinic for her A1C test if she's going to need an appointment anyway.    Patient still has questions about what dose of vitamin D she should be on and if she should be taking a multivitamin? She also requested an order for a vitamin D lab test to see if she is deficient.

## 2024-01-09 ENCOUNTER — ANTICOAGULATION THERAPY VISIT (OUTPATIENT)
Dept: ANTICOAGULATION | Facility: CLINIC | Age: 84
End: 2024-01-09
Payer: COMMERCIAL

## 2024-01-09 ENCOUNTER — MEDICAL CORRESPONDENCE (OUTPATIENT)
Dept: HEALTH INFORMATION MANAGEMENT | Facility: CLINIC | Age: 84
End: 2024-01-09

## 2024-01-09 DIAGNOSIS — I48.20 CHRONIC ATRIAL FIBRILLATION (H): ICD-10-CM

## 2024-01-09 DIAGNOSIS — I48.21 PERMANENT ATRIAL FIBRILLATION (H): Primary | ICD-10-CM

## 2024-01-09 LAB — INR (EXTERNAL): 1.7 (ref 0.9–1.1)

## 2024-01-09 NOTE — PROGRESS NOTES
ANTICOAGULATION MANAGEMENT     Savanna Rehman 83 year old female is on warfarin with subtherapeutic INR result. (Goal INR 2.0-3.0)    Recent labs: (last 7 days)     01/09/24  1309   INR 1.7*       ASSESSMENT     Source(s): Chart Review and Home Care/Facility Nurse     Warfarin doses taken: Warfarin taken as instructed  Diet: No new diet changes identified  Medication/supplement changes: Stopped Cymbalta, this could be effecting INR  New illness, injury, or hospitalization: No  Signs or symptoms of bleeding or clotting: No  Previous result: Subtherapeutic  Additional findings: None       PLAN     Recommended plan for ongoing change(s) affecting INR     Dosing Instructions: booster dose then Increase your warfarin dose (5% change) with next INR in 1 week       Summary  As of 1/9/2024      Full warfarin instructions:  1/9: 5 mg; Otherwise 2.5 mg every Mon; 3.75 mg all other days   Next INR check:  1/16/2024               Telephone call with Evelin home care nurse who agrees to plan and repeated back plan correctly    Orders given to  Homecare nurse/facility to recheck    Education provided:   Please call back if any changes to your diet, medications or how you've been taking warfarin  Symptom monitoring: monitoring for bleeding signs and symptoms, monitoring for clotting signs and symptoms, and monitoring for stroke signs and symptoms  Contact [phone]  with any changes, questions or concerns.     Plan made per [facility] anticoagulation protocol    Dorothy River RN  Anticoagulation Clinic  1/9/2024    _______________________________________________________________________     Anticoagulation Episode Summary       Current INR goal:  2.0-3.0   TTR:  51.9% (1 y)   Target end date:  Indefinite   Send INR reminders to:  NIKKI CHASE    Indications    Permanent atrial fibrillation (H) [I48.21]  Chronic atrial fibrillation (H) [I48.20]  Long term (current) use of anticoagulants (Resolved) [Z79.01]             Comments:   Home care              Anticoagulation Care Providers       Provider Role Specialty Phone number    Patti Suárez MD Referring Internal Medicine [phone]

## 2024-01-11 ENCOUNTER — NURSE TRIAGE (OUTPATIENT)
Dept: NURSING | Facility: CLINIC | Age: 84
End: 2024-01-11
Payer: COMMERCIAL

## 2024-01-12 ENCOUNTER — TELEPHONE (OUTPATIENT)
Dept: INTERNAL MEDICINE | Facility: CLINIC | Age: 84
End: 2024-01-12

## 2024-01-12 ENCOUNTER — VIRTUAL VISIT (OUTPATIENT)
Dept: INTERNAL MEDICINE | Facility: CLINIC | Age: 84
End: 2024-01-12
Payer: COMMERCIAL

## 2024-01-12 DIAGNOSIS — H81.10 BENIGN PAROXYSMAL POSITIONAL VERTIGO, UNSPECIFIED LATERALITY: ICD-10-CM

## 2024-01-12 DIAGNOSIS — F33.1 MODERATE EPISODE OF RECURRENT MAJOR DEPRESSIVE DISORDER (H): Primary | ICD-10-CM

## 2024-01-12 DIAGNOSIS — F41.9 ANXIETY: ICD-10-CM

## 2024-01-12 PROCEDURE — 99443 PR PHYSICIAN TELEPHONE EVALUATION 21-30 MIN: CPT | Mod: 93 | Performed by: NURSE PRACTITIONER

## 2024-01-12 RX ORDER — ESCITALOPRAM OXALATE 5 MG/1
5 TABLET ORAL DAILY
Qty: 30 TABLET | Refills: 1 | Status: SHIPPED | OUTPATIENT
Start: 2024-01-12 | End: 2024-02-22

## 2024-01-12 ASSESSMENT — ANXIETY QUESTIONNAIRES
GAD7 TOTAL SCORE: 4
3. WORRYING TOO MUCH ABOUT DIFFERENT THINGS: SEVERAL DAYS
7. FEELING AFRAID AS IF SOMETHING AWFUL MIGHT HAPPEN: NOT AT ALL
2. NOT BEING ABLE TO STOP OR CONTROL WORRYING: SEVERAL DAYS
1. FEELING NERVOUS, ANXIOUS, OR ON EDGE: NOT AT ALL
IF YOU CHECKED OFF ANY PROBLEMS ON THIS QUESTIONNAIRE, HOW DIFFICULT HAVE THESE PROBLEMS MADE IT FOR YOU TO DO YOUR WORK, TAKE CARE OF THINGS AT HOME, OR GET ALONG WITH OTHER PEOPLE: SOMEWHAT DIFFICULT
5. BEING SO RESTLESS THAT IT IS HARD TO SIT STILL: SEVERAL DAYS
GAD7 TOTAL SCORE: 4
6. BECOMING EASILY ANNOYED OR IRRITABLE: SEVERAL DAYS

## 2024-01-12 ASSESSMENT — PATIENT HEALTH QUESTIONNAIRE - PHQ9
5. POOR APPETITE OR OVEREATING: NOT AT ALL
SUM OF ALL RESPONSES TO PHQ QUESTIONS 1-9: 11

## 2024-01-12 NOTE — TELEPHONE ENCOUNTER
Next 5 appointments (look out 90 days)      Jan 12, 2024 11:00 AM  Provider Visit with BATSHEVA Padilla CNP  [facility] ([facility] ) [address]  [address]  [facility] [address] [phone]          Has upcoming appointment with Maria Fernanda

## 2024-01-12 NOTE — PROGRESS NOTES
Savanna is a 83 year old who is being evaluated via a billable telephone visit.      What phone number would you like to be contacted at? [phone]  How would you like to obtain your AVS? Mail a copy    Distant Location (provider location):  On-site    Assessment & Plan     Moderate episode of recurrent major depressive disorder (H)  Discussed medication - trial of low dose and may increase if tolerated over time   F/U appointment made     Had tremor with zoloft, but lexapro has been recommended by neurology with seizures and neurological issues, so hopefully will not cause the tremors   - escitalopram (LEXAPRO) 5 MG tablet; Take 1 tablet (5 mg) by mouth daily    Anxiety  As above   - escitalopram (LEXAPRO) 5 MG tablet; Take 1 tablet (5 mg) by mouth daily      21 minutes spent by me on the date of the encounter doing chart review, history and exam, documentation and further activities per the note       Depression Screening Follow Up        1/12/2024     9:51 AM   PHQ   PHQ-9 Total Score 11   Q9: Thoughts of better off dead/self-harm past 2 weeks Not at all         Patient Instructions   Lexapro- escitalopram  5 mg daily     Follow up Dr Lauren in a month   My  will call and set you up     BATSHEVA Padilla [facility]   Savanna is a 83 year old, presenting for the following health issues:    DEPRESSION      HPI       2/6/2023    10:05 AM 3/22/2023     9:23 AM 1/12/2024     9:51 AM   PHQ   PHQ-9 Total Score 15 14 11   Q9: Thoughts of better off dead/self-harm past 2 weeks Not at all Not at all Not at all         2/6/2023    10:05 AM 3/22/2023     9:27 AM 1/12/2024     9:51 AM   LAURIE-7 SCORE   Total Score 13 (moderate anxiety) 3 (minimal anxiety)    Total Score 13 3 4     Background: Patient reports he suffers from depression. She was started on Zoloft before, but she was having tremors. Her PCP switched her to Duloxetine. She reports Duloxetine was making  "her sleep all day, so she stopped taking it. She contacted her PCP and a referral to mental health was placed on 1/5. She tried to make an appt but they don't have an opening until the end of March.     Assessment: Reports she feels like she cries a lot. States \"I just need to stop crying\". States she doesn't think she can wait until the end of March to see someone. Denies any suicidal or homicidal thoughts    No alcohol since April   Instead of taking the medication that cost her $275 that did not work - she would rather take the money and buy some [employer] or [employer] instead     Had no Bree or New years   Lots of things have not been working for her     Does not want to keep weeping      Will get prescription on Sunday    Discussed will start lower on dose and have her follow up - will try to be gentle with dose as she has had issues with previous medication - certainly can be increased if needed and tolerated    setting up with Dr Lauren for a month out         Review of Systems   Constitutional, HEENT, cardiovascular, pulmonary, GI, , musculoskeletal, neuro, skin, endocrine and psych systems are negative, except as otherwise noted.      Objective           Vitals:  No vitals were obtained today due to virtual visit.    Physical Exam   alert and emotional distress related to her mood - tired of crying   PSYCH: Alert and oriented times 3; coherent speech, normal   rate and volume, able to articulate logical thoughts, able   to abstract reason, no tangential thoughts, no hallucinations   or delusions  Her affect is normal  RESP: No cough, no audible wheezing, able to talk in full sentences  Remainder of exam unable to be completed due to telephone visits                Phone call duration: 21 minutes      "

## 2024-01-12 NOTE — TELEPHONE ENCOUNTER
"  Nurse Triage SBAR    Is this a 2nd Level Triage? NO    Situation: Depression.    Background: Patient reports he suffers from depression. She was started on Zoloft before, but she was having tremors. Her PCP switched her to Duloxetine. She reports Duloxetine was making her sleep all day, so she stopped taking it. She contacted her PCP and a referral to mental health was placed on 1/5. She tried to make an appt but they don't have an opening until the end of March.    Assessment: Reports she feels like she cries a lot. States \"I just need to stop crying\". States she doesn't think she can wait until the end of March to see someone. Denies any suicidal or homicidal thoughts.     Protocol Recommended Disposition:   See PCP Within 24 Hours    Recommendation: Recommendation is to see PCP within 24 hours.  Patient asked for a telephone visit and she only rides her scooter and the weather will be getting cold. Reviewed care advice and call back instructions. Patient plans to follow advice. Warm transferred to scheduling to set up a telephone visit for tomorrow. Will route to PCP and care team as FYI.    Routed to provider    Does the patient meet one of the following criteria for ADS visit consideration? 16+ years old, with an [facility] PCP     TIP  Providers, please consider if this condition is appropriate for management at one of our Acute and Diagnostic Services sites.     If patient is a good candidate, please use dotphrase <dot>triageresponse and select Refer to ADS to document.        Reason for Disposition   [1] Depression AND [2] worsening (e.g., sleeping poorly, less able to do activities of daily living)    Additional Information   Negative: Patient attempted suicide   Negative: Patient is threatening suicide now   Negative: Violent behavior, or threatening to physically hurt or kill someone   Negative: [1] Patient is very confused (disoriented, slurred speech) AND [2] no other adult (e.g., friend or family member) " available   Negative: [1] Difficult to awaken or acting very confused (disoriented, slurred speech) AND [2] new-onset   Negative: Sounds like a life-threatening emergency to the triager   Negative: Suicide thoughts, threats, attempts, or questions   Negative: Questions or concerns about alcohol use, unhealthy alcohol use, binge drinking, intoxication, or withdrawal   Negative: Questions or concerns about substance use (drug use), unhealthy drug use, intoxication, or withdrawal   Negative: Questions or concerns about bipolar disorder (manic depression)   Negative: Questions or concerns about depression during the postpartum period (< 1 year since delivery)   Negative: [1] Depression AND [2] unable to do any of normal activities (e.g., self care, school, work; in comparison to baseline).   Negative: Very strange or confused behavior   Negative: Patient sounds very sick or weak to the triager    Protocols used: Depression-A-AH

## 2024-01-12 NOTE — PATIENT INSTRUCTIONS
Lexapro- escitalopram  5 mg daily     Follow up Dr Lauren in a month   My  will call and set you up

## 2024-01-15 ENCOUNTER — TELEPHONE (OUTPATIENT)
Dept: INTERNAL MEDICINE | Facility: CLINIC | Age: 84
End: 2024-01-15
Payer: COMMERCIAL

## 2024-01-15 ENCOUNTER — MEDICAL CORRESPONDENCE (OUTPATIENT)
Dept: HEALTH INFORMATION MANAGEMENT | Facility: CLINIC | Age: 84
End: 2024-01-15

## 2024-01-15 NOTE — TELEPHONE ENCOUNTER
Patient calls about Lexapro   She read the side effect and medication information packet and wants reassurance from the doctor that this is ok for her to start.    Patient has not started the medication.     Advised to monitor for side effects and call triage right away.   Advised patient those are potential side effects listed in the brochures.    Reviewed providers note with patient- Had tremor with zoloft, but lexapro has been recommended by neurology with seizures and neurological issues, so hopefully will not cause the tremors   - escitalopram (LEXAPRO) 5 MG tablet; Take 1 tablet (5 mg) by mouth daily  Patient states she feels reassured to start.     LOV 1/12/24- virtual for depression.     Reminded of Follow up telephone appointment date and time

## 2024-01-17 ENCOUNTER — TRANSFERRED RECORDS (OUTPATIENT)
Dept: HEALTH INFORMATION MANAGEMENT | Facility: CLINIC | Age: 84
End: 2024-01-17
Payer: COMMERCIAL

## 2024-01-18 NOTE — TELEPHONE ENCOUNTER
Patient informed of provider's message below.  She will give Lexapro a chance and call back with any issues from it.

## 2024-01-18 NOTE — TELEPHONE ENCOUNTER
I do not know her concerns re medication, but feel this one may fit her better and not cause the side effects she experienced with zoloft.I chose for a reason.  If she has issues with medication, she can let us know but she should give it a chance

## 2024-01-18 NOTE — TELEPHONE ENCOUNTER
Patient called to follow-up on this. Patient is waiting for a call back.     Routing to Maria Fernanda Shah (per patient's request) as initial encounter hasn't been routed to anyone.    Please advise. Thank you.

## 2024-01-22 ENCOUNTER — NURSE TRIAGE (OUTPATIENT)
Dept: INTERNAL MEDICINE | Facility: CLINIC | Age: 84
End: 2024-01-22
Payer: COMMERCIAL

## 2024-01-22 ENCOUNTER — TRANSFERRED RECORDS (OUTPATIENT)
Dept: HEALTH INFORMATION MANAGEMENT | Facility: CLINIC | Age: 84
End: 2024-01-22
Payer: COMMERCIAL

## 2024-01-22 LAB
CREATININE (EXTERNAL): 0.7 MG/DL (ref 0.52–1.04)
GFR ESTIMATED (EXTERNAL): >60 ML/MIN/1.7
GLUCOSE (EXTERNAL): 102 MG/DL (ref 70–99)
HBA1C MFR BLD: 5.5 %
POTASSIUM (EXTERNAL): 4 MMOL/L (ref 3.5–5.1)

## 2024-01-22 NOTE — TELEPHONE ENCOUNTER
Patient called back, states she has [employer] transportation arranged and will indeed keep appointment as scheduled arriving at 8:40 a.m.  BENITA Graham R.N.

## 2024-01-22 NOTE — TELEPHONE ENCOUNTER
There are no appointments in Dr. Lauren schedule  If any the afternoon patient want to switch her appointment with her, you may try it if you have time

## 2024-01-22 NOTE — TELEPHONE ENCOUNTER
General Call      Reason for Call:  call back    What are your questions or concerns:  Patient has an appt on 01/23/2024 and would like to be seen later in the day or a different day this week with primary     Date of last appointment with provider: n/a    Could we send this information to you in [facility] [employer] or would you prefer to receive a phone call?:   Patient would prefer a phone call   Okay to leave a detailed message?: Yes at Cell number on file:    Telephone Information:   Mobile [phone]

## 2024-01-23 ENCOUNTER — ANTICOAGULATION THERAPY VISIT (OUTPATIENT)
Dept: ANTICOAGULATION | Facility: CLINIC | Age: 84
End: 2024-01-23

## 2024-01-23 DIAGNOSIS — I48.21 PERMANENT ATRIAL FIBRILLATION (H): Primary | ICD-10-CM

## 2024-01-23 DIAGNOSIS — I48.20 CHRONIC ATRIAL FIBRILLATION (H): ICD-10-CM

## 2024-01-23 LAB — INR (EXTERNAL): 2.8 (ref 0.9–1.1)

## 2024-01-23 NOTE — TELEPHONE ENCOUNTER
Patient calls asking for an appointment to be seen for edematous legs/weight gain.  States she missed her appointment this morning because of scheduling conflict with mobility company per patient.  Offered patient an appointment this afternoon, any time tomorrow or any time on 1/25 but she declined them all.  Patient states she is going to have to speak with her family and will consider having one of them take her to ER.  BENITA Graham R.N.

## 2024-01-23 NOTE — TELEPHONE ENCOUNTER
Patient calls, states that [employer] scheduled her ride incorrectly and did not pick her up this morning when they said they would. When she called them, they didn't believe her that she said she had a ride set up for this morning and had to listen to her previous call to confirm their mistake. Patient states she hung up on them and then called the clinic.     Offered to check for an appointment with another provider, but informed patient Dr. Lauren's previous message said that she didn't have any openings for the afternoon or another time this week. Patient states [employer] is calling her back and wants to speak to them. Call ended.    Mahsa Arteaga RN  [facility]

## 2024-01-23 NOTE — TELEPHONE ENCOUNTER
"Nurse Triage SBAR    Is this a 2nd Level Triage? YES, LICENSED PRACTITIONER REVIEW IS REQUIRED    Situation: Patient is requesting an appointment with Dr. Lauren this week for leg swelling.     Background: Patient calls frustrated and tearful because [employer] was late picking her up to her appointment today with primary care provider and they left without picking up patient because patient was taking too long. Patient doesn't have additional rides to get to clinic today.     Assessment: Patient's home health nurse is coming today around 1:30 pm for her regular check in/INR    Patient has had bilateral leg swelling for several weeks with redness all over her legs. Patient reports swelling has gotten worse. Patient states she has restless legs that are causing discomfort.    Patient denies fever or chest pain. Patient reports some shortness of breath at times that started a few days ago in left side of chest  when she is upset/anxious.      Patient was seen by Maria Fernanda Shah for 1/12/24 to start taking Lexapro 5mg, but patient has not started this yet due to side effects and \"It sounds like it could kill me\".     Protocol Recommended Disposition:   No disposition on file.    Recommendation: Patient is requesting primary care provider contact her gastroenterologist, Flower Dia to give her permission to see patient's swelled legs. Provider has same day slot on 1/25/2024 for 10am? Can provider see patient during this time?     Patient unable to get to [facility] today due to no transportation and patient states she has no one to take her to clinic. Declines calling ambulance or  emergency room due to \"owing them too much money\".     Routed to provider    Does the patient meet one of the following criteria for [facility]S visit consideration? 16+ years old, with an [facility] PCP     TIP  Providers, please consider if this condition is appropriate for management at one of our Acute and Diagnostic Services sites.     If patient is " "a good candidate, please use dotphrase <dot>triageresponse and select Refer to ADS to document.    Per uses a lift chair to move around and patient says she keeps being told to stay in bed.     Tx:   Reason for Disposition   Fever and red area (or area very tender to touch)    Additional Information   Negative: Sounds like a life-threatening emergency to the triager   Negative: Chest pain   Negative: Followed an insect bite and has localized swelling (e.g., small area of puffy or swollen skin)   Negative: Followed a knee injury   Negative: Ankle or foot injury   Negative: Pregnant with leg swelling or edema   Negative: Difficulty breathing at rest   Negative: Entire foot is cool or blue in comparison to other side   Negative: SEVERE swelling (e.g., swelling extends above knee, entire leg is swollen, weeping fluid)   Negative: Cast on leg or ankle and has increasing pain   Negative: Can't walk or can barely stand (new-onset)    Answer Assessment - Initial Assessment Questions  1. ONSET: \"When did the swelling start?\" (e.g., minutes, hours, days)      Several weeks ago.   2. LOCATION: \"What part of the leg is swollen?\"  \"Are both legs swollen or just one leg?\"      Both  3. SEVERITY: \"How bad is the swelling?\" (e.g., localized; mild, moderate, severe)    - Localized: Small area of swelling localized to one leg.    - MILD pedal edema: Swelling limited to foot and ankle, pitting edema < 1/4 inch (6 mm) deep, rest and elevation eliminate most or all swelling.    - MODERATE edema: Swelling of lower leg to knee, pitting edema > 1/4 inch (6 mm) deep, rest and elevation only partially reduce swelling.    - SEVERE edema: Swelling extends above knee, facial or hand swelling present.       Moderate  4. REDNESS: \"Does the swelling look red or infected?\"      Yes  5. PAIN: \"Is the swelling painful to touch?\" If Yes, ask: \"How painful is it?\"   (Scale 1-10; mild, moderate or severe)      Mild, discomfort  6. FEVER: \"Do you have a " "fever?\" If Yes, ask: \"What is it, how was it measured, and when did it start?\"       No.   7. CAUSE: \"What do you think is causing the leg swelling?\"      Possible DVT  8. MEDICAL HISTORY: \"Do you have a history of blood clots (e.g., DVT), cancer, heart failure, kidney disease, or liver failure?\"      Stage 3 non alcoholic cirrhosis   9. RECURRENT SYMPTOM: \"Have you had leg swelling before?\" If Yes, ask: \"When was the last time?\" \"What happened that time?\"      No.   10. OTHER SYMPTOMS: \"Do you have any other symptoms?\" (e.g., chest pain, difficulty breathing)        some shortness of breath at times that started a few days ago in left side of chest.  11. PREGNANCY: \"Is there any chance you are pregnant?\" \"When was your last menstrual period?\"        N/A    Protocols used: Leg Swelling and Edema-A-OH    "

## 2024-01-23 NOTE — PROGRESS NOTES
ANTICOAGULATION MANAGEMENT     Savanna Rehman 83 year old female is on warfarin with therapeutic INR result. (Goal INR 2.0-3.0)    Recent labs: (last 7 days)     01/23/24  1429   INR 2.8*       ASSESSMENT     Source(s): Chart Review and Home Care/Facility Nurse     Warfarin doses taken: Warfarin taken as instructed  Diet: No new diet changes identified  Medication/supplement changes: None noted  New illness, injury, or hospitalization: No  Signs or symptoms of bleeding or clotting: No  Previous result: Subtherapeutic  Additional findings: None       PLAN     Recommended plan for no diet, medication or health factor changes affecting INR     Dosing Instructions: Continue your current warfarin dose with next INR in 2 weeks       Summary  As of 1/23/2024      Full warfarin instructions:  2.5 mg every Mon; 3.75 mg all other days   Next INR check:  2/6/2024               Telephone call with [facility] home care nurse who verbalizes understanding and agrees to plan and who agrees to plan and repeated back plan correctly    Orders given to  Homecare nurse/facility to recheck    Education provided:   Contact [phone]  with any changes, questions or concerns.     Plan made per [facility] anticoagulation protocol    Sanjana Fox RN  Anticoagulation Clinic  1/23/2024    _______________________________________________________________________     Anticoagulation Episode Summary       Current INR goal:  2.0-3.0   TTR:  50.8% (1 y)   Target end date:  Indefinite   Send INR reminders to:  NIKKI CHASE    Indications    Permanent atrial fibrillation (H) [I48.21]  Chronic atrial fibrillation (H) [I48.20]  Long term (current) use of anticoagulants (Resolved) [Z79.01]             Comments:  Home care              Anticoagulation Care Providers       Provider Role Specialty Phone number    Patti Suárez MD Referring Internal Medicine [phone]

## 2024-01-24 ENCOUNTER — OFFICE VISIT (OUTPATIENT)
Dept: INTERNAL MEDICINE | Facility: CLINIC | Age: 84
End: 2024-01-24
Payer: COMMERCIAL

## 2024-01-24 VITALS
WEIGHT: 247.1 LBS | RESPIRATION RATE: 18 BRPM | OXYGEN SATURATION: 90 % | HEIGHT: 68 IN | DIASTOLIC BLOOD PRESSURE: 68 MMHG | BODY MASS INDEX: 37.45 KG/M2 | HEART RATE: 82 BPM | SYSTOLIC BLOOD PRESSURE: 120 MMHG | TEMPERATURE: 98.4 F

## 2024-01-24 DIAGNOSIS — Z12.83 SCREENING FOR SKIN CANCER: ICD-10-CM

## 2024-01-24 DIAGNOSIS — M79.89 SWELLING OF LOWER LEG: Primary | ICD-10-CM

## 2024-01-24 DIAGNOSIS — I11.0 HYPERTENSIVE HEART DISEASE WITH CHRONIC DIASTOLIC CONGESTIVE HEART FAILURE (H): ICD-10-CM

## 2024-01-24 DIAGNOSIS — F33.1 MODERATE EPISODE OF RECURRENT MAJOR DEPRESSIVE DISORDER (H): ICD-10-CM

## 2024-01-24 DIAGNOSIS — I48.20 CHRONIC ATRIAL FIBRILLATION (H): ICD-10-CM

## 2024-01-24 DIAGNOSIS — I50.32 HYPERTENSIVE HEART DISEASE WITH CHRONIC DIASTOLIC CONGESTIVE HEART FAILURE (H): ICD-10-CM

## 2024-01-24 PROCEDURE — 99214 OFFICE O/P EST MOD 30 MIN: CPT | Performed by: INTERNAL MEDICINE

## 2024-01-24 RX ORDER — RESPIRATORY SYNCYTIAL VIRUS VACCINE 120MCG/0.5
0.5 KIT INTRAMUSCULAR ONCE
Qty: 1 EACH | Refills: 0 | Status: CANCELLED | OUTPATIENT
Start: 2024-01-24 | End: 2024-01-24

## 2024-01-24 NOTE — PATIENT INSTRUCTIONS
Please take the indapamide at two tabs per day for one week.    Please keep monitoring the weight at home. For any weight gain >3 lbs per day, please give the clinic a call or follow up sooner.    Please follow up in one week.    Please start taking the lexapro(escitalopram)

## 2024-01-24 NOTE — PROGRESS NOTES
"  Assessment & Plan     Swelling of lower leg  Continues on indapamide medication but has noted increased lower leg swelling with weight gain.  2+ pitting edema noted on examination.  Edema present bilaterally.  No increased redness or erythema or tenderness on palpation.  Patient reassured that this is not cellulitis.  Recommendations to increase indapamide to 2 tablets/day.  Increase medication dose for 1 week.  Follow-up in 1 week.    Chronic atrial fibrillation (H)  Overall, clinically stable.  Continues on warfarin medication.      Hypertensive heart disease with chronic diastolic congestive heart failure (H)  Blood pressure reviewed in office.  Within target.      Moderate episode of recurrent major depressive disorder (H)  Had a virtual visit around 2 weeks ago the patient was initiated on Lexapro medication at 5 mg daily.  Due to concerns of side effects, patient has not started taking the medication.  Due to current symptoms of depression, patient encouraged to start the medication.  Will need follow-up in 4 to 6 weeks post medication initiation for reevaluation.    Screening for skin cancer  Would like to go ahead with dermatology referral for skin check.  - Adult Dermatology  Referral; Future                Subjective   Savanna is a 83 year old, presenting for the following health issues:  Leg Swelling        1/24/2024     1:25 PM   Additional Questions   Roomed by Nydiai   Accompanied by Self     HPI     Patient comes in today with multiple concerns.  Has noted increased bilateral lower leg swelling.  As well, patient has been experiencing symptoms of depression with low moods.          Review of Systems  Constitutional, HEENT, cardiovascular, pulmonary, gi and gu systems are negative, except as otherwise noted.      Objective    /68 (BP Location: Right arm, Patient Position: Sitting, Cuff Size: Adult Large)   Pulse 82   Temp 98.4  F (36.9  C) (Tympanic)   Resp 18   Ht 1.727 m (5' 8\")   " Wt 112.1 kg (247 lb 1.6 oz)   LMP  (LMP Unknown)   SpO2 90%   BMI 37.57 kg/m    Body mass index is 37.57 kg/m .  Physical Exam   GENERAL: alert and no distress  RESP: lungs clear to auscultation - no rales, rhonchi or wheezes  CV: regular rate and rhythm, normal S1 S2  MS: no gross musculoskeletal defects noted, 2+ pitting edema bilaterally.  Nontender.  NEURO: Normal strength and tone, mentation intact and speech normal  PSYCH: mentation appears normal, affect normal.          Signed Electronically by: Taylor Payan MD

## 2024-01-29 ENCOUNTER — TELEPHONE (OUTPATIENT)
Dept: INTERNAL MEDICINE | Facility: CLINIC | Age: 84
End: 2024-01-29
Payer: COMMERCIAL

## 2024-01-29 NOTE — TELEPHONE ENCOUNTER
1/23/24 physician's order for escitalopram 5mg recieved via fax. Form in your mailbox for signature. Please advise.

## 2024-01-30 ENCOUNTER — MEDICAL CORRESPONDENCE (OUTPATIENT)
Dept: HEALTH INFORMATION MANAGEMENT | Facility: CLINIC | Age: 84
End: 2024-01-30

## 2024-01-31 ENCOUNTER — NURSE TRIAGE (OUTPATIENT)
Dept: INTERNAL MEDICINE | Facility: CLINIC | Age: 84
End: 2024-01-31

## 2024-01-31 DIAGNOSIS — G43.801 OTHER MIGRAINE WITH STATUS MIGRAINOSUS, NOT INTRACTABLE: ICD-10-CM

## 2024-01-31 RX ORDER — BUTALBITAL, ASPIRIN, AND CAFFEINE 325; 50; 40 MG/1; MG/1; MG/1
CAPSULE ORAL
Qty: 15 CAPSULE | Refills: 0 | Status: CANCELLED | OUTPATIENT
Start: 2024-01-31

## 2024-01-31 NOTE — TELEPHONE ENCOUNTER
Patient calling for update on refill request. Patient requesting this be filled ASAP so she can be sure to get a ride to the pharmacy.

## 2024-01-31 NOTE — TELEPHONE ENCOUNTER
Patient called clinic to discuss leg swelling, was supposed to have 1 week follow up with Dr. Payan but transportation did not arrive for for appointment. Call dropped during transfer, writer attempted to call patient- call unable to go through.     Genoveva HORTON

## 2024-01-31 NOTE — TELEPHONE ENCOUNTER
Patient asking for refill of Fiorinal, states she uses if for migraines, uses sparingly.  Last rx was for #15 tablets Oct. 2023 but patient believes she mixed up a bottle of  Fiorinal with last new rx and doesn't want to take them if some are .  BENITA Graham R.N.

## 2024-02-01 ENCOUNTER — OFFICE VISIT (OUTPATIENT)
Dept: INTERNAL MEDICINE | Facility: CLINIC | Age: 84
End: 2024-02-01
Payer: COMMERCIAL

## 2024-02-01 ENCOUNTER — TELEPHONE (OUTPATIENT)
Dept: INTERNAL MEDICINE | Facility: CLINIC | Age: 84
End: 2024-02-01

## 2024-02-01 VITALS
RESPIRATION RATE: 18 BRPM | WEIGHT: 243.5 LBS | BODY MASS INDEX: 36.9 KG/M2 | HEIGHT: 68 IN | OXYGEN SATURATION: 94 % | HEART RATE: 65 BPM | SYSTOLIC BLOOD PRESSURE: 118 MMHG | TEMPERATURE: 97.1 F | DIASTOLIC BLOOD PRESSURE: 68 MMHG

## 2024-02-01 DIAGNOSIS — M79.89 SWELLING OF LOWER LEG: Primary | ICD-10-CM

## 2024-02-01 DIAGNOSIS — L98.9 SKIN LESION: Primary | ICD-10-CM

## 2024-02-01 DIAGNOSIS — E55.9 VITAMIN D DEFICIENCY: ICD-10-CM

## 2024-02-01 DIAGNOSIS — G43.009 MIGRAINE WITHOUT AURA AND WITHOUT STATUS MIGRAINOSUS, NOT INTRACTABLE: ICD-10-CM

## 2024-02-01 PROCEDURE — 99214 OFFICE O/P EST MOD 30 MIN: CPT | Performed by: INTERNAL MEDICINE

## 2024-02-01 PROCEDURE — 80048 BASIC METABOLIC PNL TOTAL CA: CPT | Performed by: INTERNAL MEDICINE

## 2024-02-01 PROCEDURE — 36415 COLL VENOUS BLD VENIPUNCTURE: CPT | Performed by: INTERNAL MEDICINE

## 2024-02-01 RX ORDER — BUTALBITAL, ASPIRIN, AND CAFFEINE 325; 50; 40 MG/1; MG/1; MG/1
1 CAPSULE ORAL EVERY 4 HOURS PRN
Qty: 10 CAPSULE | Refills: 0 | Status: SHIPPED | OUTPATIENT
Start: 2024-02-01 | End: 2024-02-22

## 2024-02-01 RX ORDER — FUROSEMIDE 20 MG
20 TABLET ORAL DAILY
Qty: 10 TABLET | Refills: 0 | Status: SHIPPED | OUTPATIENT
Start: 2024-02-01 | End: 2024-02-08

## 2024-02-01 NOTE — COMMUNITY RESOURCES LIST (ENGLISH)
02/01/2024   [facility]  N/A  For questions about this resource list or additional care needs, please contact your primary care clinic or care manager.  Phone: [phone]   Email: N/A   Address: [address] [place] [id number]   Hours: N/A        Transportation       Free or low-cost transportation  1  [employer]. Distance: 0.45 miles      In-Person   [address] [facility] [address] [place] [address]  Language: English  Hours: Mon - Fri 7:00 AM - 5:00 PM  Fees: Free   Phone: (701) 827-4819 Email: [email or web address] Website: https://Vigo/     2  [employer] [facility] [employer] Bus Distance: 12.55 miles      In-Person   [address] [place] [address]  Language: English  Hours: Tue 9:00 AM - 2:00 PM  Fees: Self Pay   Phone: (512) 619-5306 Email: info@Nala Website: [email or web address]://[email or web address].[email or web address]/     Transportation to medical appointments  3  [facility] [employer] Distance: 3.52 miles      In-Person   [address] [facility] [address] [place] [address]  Language: Khmer, English, Oromo, English  Hours: Mon - Sat 5:00 AM - 9:00 PM  Fees: Insurance, Self Pay   Phone: (361) 849-2616 Email: info@Fan[employer]iles Website: http://www.FanMiles/     4  [facility] Transportation Distance: 7.61 miles      [address] [place] [address]  Language: English  Hours: Mon - Fri 6:30 AM - 4:30 PM  Fees: Insurance, Self Pay   Phone: (821) 170-5721 Email: [email or web address] Website: https://Redondo BeachSwopboard          Important Numbers & Websites       Emergency Services   911  City Services   311  Poison Control   (282) 231-9689  Suicide Prevention Lifeline   (844) 567-3970 (TALK)  Child Abuse Hotline   (223) 744-6300 (4-A-Child)  Sexual Assault Hotline   (724) 701-3610 (HOPE)  National Runaway Safeline   (942) 430-3314 (RUNAWAY)  All-Options Talkline   (281) 344-1560  Substance Abuse Referral    (274) 389-8180 (HELP)

## 2024-02-01 NOTE — PROGRESS NOTES
Assessment & Plan     Swelling of lower leg  Patient has been taking indapamide 2.5 mg daily.  Has not noted any difference in the lower leg swelling.  2+ pitting edema on examination today.  Discussed with the patient on IV diuresis with the ADS but patient does not want to go ahead with that today since patient is depending on a friend for a ride.  She would want to go ahead with addition of a second diuretic.  Discussed with the patient on addition of a loop diuretic.  Patient has been on furosemide in the past.  Update electrolytes/creatinine check before starting furosemide.  - Basic metabolic panel  (Ca, Cl, CO2, Creat, Gluc, K, Na, BUN); Future  - furosemide (LASIX) 20 MG tablet; Take 1 tablet (20 mg) by mouth daily  - Basic metabolic panel  (Ca, Cl, CO2, Creat, Gluc, K, Na, BUN)    Migraine without aura and without status migrainosus, not intractable  Asking for refill for Fiorinal.  Uses it for migraines sparingly as needed.  Patient was cautioned on increased bleeding risk with the warfarin medication that patient takes for underlying atrial fibrillation.  Patient states that she has tolerated the medication well in the past.  - butalbital-aspirin-caffeine (FIORINAL) -40 MG capsule; Take 1 capsule by mouth every 4 hours as needed for headaches or migraine    Vitamin D deficiency  Would like to recheck a vitamin D since patient stopped her vitamin D supplement.                  Mamadou Corrales is a 83 year old, presenting for the following health issues:  Follow Up        2/1/2024    11:22 AM   Additional Questions   Roomed by Aure   Accompanied by Self     History of Present Illness       Reason for visit:  Fluid colletion in legs    She eats 0-1 servings of fruits and vegetables daily.She consumes 0 sweetened beverage(s) daily.She exercises with enough effort to increase her heart rate 9 or less minutes per day.  She exercises with enough effort to increase her heart rate 3 or less days per  "week.   She is taking medications regularly.                 Review of Systems  Constitutional, HEENT, cardiovascular, pulmonary, gi and gu systems are negative, except as otherwise noted.      Objective    /68 (BP Location: Right arm, Patient Position: Sitting, Cuff Size: Adult Large)   Pulse 65   Temp 97.1  F (36.2  C) (Tympanic)   Resp 18   Ht 1.727 m (5' 8\")   Wt 110.5 kg (243 lb 8 oz)   LMP  (LMP Unknown)   SpO2 94%   BMI 37.02 kg/m    Body mass index is 37.02 kg/m .  Physical Exam   GENERAL: alert and no distress  RESP: lungs clear to auscultation - no rales, rhonchi or wheezes  CV: regular rate and rhythm, normal S1 S2  MS: no gross musculoskeletal defects noted, 2+ pitting edema bilaterally.  Nontender.  NEURO: Normal strength and tone, mentation intact and speech normal  PSYCH: mentation appears normal, affect normal.            Signed Electronically by: Taylor Payan MD    "

## 2024-02-02 ENCOUNTER — TELEPHONE (OUTPATIENT)
Dept: FAMILY MEDICINE | Facility: CLINIC | Age: 84
End: 2024-02-02
Payer: COMMERCIAL

## 2024-02-02 LAB
ANION GAP SERPL CALCULATED.3IONS-SCNC: 10 MMOL/L (ref 7–15)
BUN SERPL-MCNC: 18.1 MG/DL (ref 8–23)
CALCIUM SERPL-MCNC: 9.6 MG/DL (ref 8.8–10.2)
CHLORIDE SERPL-SCNC: 99 MMOL/L (ref 98–107)
CREAT SERPL-MCNC: 1.07 MG/DL (ref 0.51–0.95)
DEPRECATED HCO3 PLAS-SCNC: 30 MMOL/L (ref 22–29)
EGFRCR SERPLBLD CKD-EPI 2021: 52 ML/MIN/1.73M2
GLUCOSE SERPL-MCNC: 113 MG/DL (ref 70–99)
POTASSIUM SERPL-SCNC: 4.4 MMOL/L (ref 3.4–5.3)
SODIUM SERPL-SCNC: 139 MMOL/L (ref 135–145)

## 2024-02-02 NOTE — TELEPHONE ENCOUNTER
This encounter is being sent to inform the clinic that this patient has a referral from Dr. Canelo Payan for the diagnoses of Skin Lesion and has requested that this patient be seen within 1-2 weeks. Based on the availability of our provider(s), we are unable to accommodate this request.    Were all sites offered this patient?  Patient already scheduled.    Does scheduling algorithm request to schedule next available?  Patient has been scheduled for the first available opening with Sowmya Pollard CNP on 7/17/2024.  We have informed the patient that the clinic will review their referral and reach out if a sooner appointment is medically necessary.

## 2024-02-02 NOTE — TELEPHONE ENCOUNTER
Patient Contact    Attempt # 1    Was call answered?  No.  Left message on voicemail with information to call nurse back at [phone]    Vani KINCAID RN  [facility] [place]  [phone]

## 2024-02-05 NOTE — TELEPHONE ENCOUNTER
Patient Contact    Attempt # 2    Was call answered?  No.  Left message on voicemail with information to call nurse back at [phone].    Vani KINCAID RN  [facility] [place]  [phone]

## 2024-02-06 ENCOUNTER — ANTICOAGULATION THERAPY VISIT (OUTPATIENT)
Dept: ANTICOAGULATION | Facility: CLINIC | Age: 84
End: 2024-02-06

## 2024-02-06 DIAGNOSIS — I48.20 CHRONIC ATRIAL FIBRILLATION (H): ICD-10-CM

## 2024-02-06 DIAGNOSIS — I48.21 PERMANENT ATRIAL FIBRILLATION (H): Primary | ICD-10-CM

## 2024-02-06 LAB — INR (EXTERNAL): 2.4 (ref 0.9–1.1)

## 2024-02-06 NOTE — PROGRESS NOTES
ANTICOAGULATION MANAGEMENT     Savanna Rehman 83 year old female is on warfarin with therapeutic INR result. (Goal INR 2.0-3.0)    Recent labs: (last 7 days)     02/06/24  1223   INR 2.4*       ASSESSMENT     Source(s): Chart Review, Patient/Caregiver Call, and Home Care/Facility Nurse     Warfarin doses taken: Warfarin taken as instructed  Diet: No new diet changes identified  Medication/supplement changes:  indapamide dose increased on last week which may be decreasing INR today  Lasix started on 1/31/24 No interaction anticipated  Lexapro started on 1/12/2024 which may be increasing INR today  New illness, injury, or hospitalization: No  Signs or symptoms of bleeding or clotting: No  Previous result: Therapeutic last visit; previously outside of goal range  Additional findings: None       PLAN     Recommended plan for no diet, medication or health factor changes affecting INR     Dosing Instructions: Continue your current warfarin dose with next INR in 2 weeks       Summary  As of 2/6/2024      Full warfarin instructions:  2.5 mg every Mon; 3.75 mg all other days   Next INR check:  2/20/2024               Telephone call with [facility] home care nurse who verbalizes understanding and agrees to plan    Orders given to  Homecare nurse/facility to recheck    Education provided:   Please call back if any changes to your diet, medications or how you've been taking warfarin    Plan made per [facility] anticoagulation protocol    Roya Sky, RN  Anticoagulation Clinic  2/6/2024    _______________________________________________________________________     Anticoagulation Episode Summary       Current INR goal:  2.0-3.0   TTR:  52.9% (1 y)   Target end date:  Indefinite   Send INR reminders to:  NIKKI CHASE    Indications    Permanent atrial fibrillation (H) [I48.21]  Chronic atrial fibrillation (H) [I48.20]  Long term (current) use of anticoagulants (Resolved) [Z79.01]             Comments:  Home care               Anticoagulation Care Providers       Provider Role Specialty Phone number    Patti Suárez MD Referring Internal Medicine [phone]

## 2024-02-07 NOTE — TELEPHONE ENCOUNTER
S/w pt and scheduled with Dr. Addison on Wednesday February 14th at 10 am for a spot only check.  Pt states she has a spot on the right side of her nose that she had cancer removed but thinks is growing back.  Pt has skin check scheduled for July but would like to be seen sooner.    Vani KINCAID RN  [facility] [place]  [phone]

## 2024-02-08 ENCOUNTER — TELEPHONE (OUTPATIENT)
Dept: INTERNAL MEDICINE | Facility: CLINIC | Age: 84
End: 2024-02-08

## 2024-02-08 ENCOUNTER — OFFICE VISIT (OUTPATIENT)
Dept: INTERNAL MEDICINE | Facility: CLINIC | Age: 84
End: 2024-02-08
Payer: COMMERCIAL

## 2024-02-08 VITALS
RESPIRATION RATE: 18 BRPM | HEIGHT: 68 IN | HEART RATE: 77 BPM | BODY MASS INDEX: 35.92 KG/M2 | WEIGHT: 237 LBS | DIASTOLIC BLOOD PRESSURE: 72 MMHG | OXYGEN SATURATION: 96 % | SYSTOLIC BLOOD PRESSURE: 122 MMHG | TEMPERATURE: 97.1 F

## 2024-02-08 DIAGNOSIS — N18.31 TYPE 2 DIABETES MELLITUS WITH STAGE 3A CHRONIC KIDNEY DISEASE, WITHOUT LONG-TERM CURRENT USE OF INSULIN (H): ICD-10-CM

## 2024-02-08 DIAGNOSIS — N18.31 STAGE 3A CHRONIC KIDNEY DISEASE (H): ICD-10-CM

## 2024-02-08 DIAGNOSIS — I77.810 ASCENDING AORTA DILATATION (H): ICD-10-CM

## 2024-02-08 DIAGNOSIS — R10.2 SUPRAPUBIC PAIN: ICD-10-CM

## 2024-02-08 DIAGNOSIS — I48.20 CHRONIC ATRIAL FIBRILLATION (H): ICD-10-CM

## 2024-02-08 DIAGNOSIS — M79.89 SWELLING OF LOWER LEG: Primary | ICD-10-CM

## 2024-02-08 DIAGNOSIS — K74.69 OTHER CIRRHOSIS OF LIVER (H): ICD-10-CM

## 2024-02-08 DIAGNOSIS — E11.22 TYPE 2 DIABETES MELLITUS WITH STAGE 3A CHRONIC KIDNEY DISEASE, WITHOUT LONG-TERM CURRENT USE OF INSULIN (H): ICD-10-CM

## 2024-02-08 LAB — HBA1C MFR BLD: 6.1 % (ref 0–5.6)

## 2024-02-08 PROCEDURE — 36415 COLL VENOUS BLD VENIPUNCTURE: CPT | Performed by: INTERNAL MEDICINE

## 2024-02-08 PROCEDURE — 80048 BASIC METABOLIC PNL TOTAL CA: CPT | Performed by: INTERNAL MEDICINE

## 2024-02-08 PROCEDURE — 99215 OFFICE O/P EST HI 40 MIN: CPT | Performed by: INTERNAL MEDICINE

## 2024-02-08 PROCEDURE — 83036 HEMOGLOBIN GLYCOSYLATED A1C: CPT | Performed by: INTERNAL MEDICINE

## 2024-02-08 RX ORDER — FUROSEMIDE 20 MG
20 TABLET ORAL DAILY
Qty: 10 TABLET | Refills: 0 | Status: ON HOLD | OUTPATIENT
Start: 2024-02-08 | End: 2024-02-24

## 2024-02-08 NOTE — TELEPHONE ENCOUNTER
Reason for Call:  Appointment Request    Patient requesting this type of appt: Chronic Diease Management/Medication/Follow-Up    Requested provider: Taylor Payan    Reason patient unable to be scheduled: Not within requested timeframe    When does patient want to be seen/preferred time: 3-7 days    Comments: Patient was told by her pcp in today's visit to schedule a follow-up for next week.  PCP does not have anything available.  Patient is looking for 02-14-24 around 11:10 for an apt.      Could we send this information to you in Re-APP or would you prefer to receive a phone call?:   Patient would prefer a phone call   Okay to leave a detailed message?: Yes at Cell number on file:    Telephone Information:   Mobile [phone]       Call taken on 2/8/2024 at 12:42 PM by Briana Camara

## 2024-02-08 NOTE — PROGRESS NOTES
Assessment & Plan     Swelling of lower leg  Overall, the leg swelling is improved since patient started taking Lasix with indapamide medication.  Update electrolytes/creatinine check.  Can consider continuing the Lasix for an extra week.  Follow-up in clinic next week.  - Basic metabolic panel  (Ca, Cl, CO2, Creat, Gluc, K, Na, BUN); Future  - furosemide (LASIX) 20 MG tablet; Take 1 tablet (20 mg) by mouth daily  - Basic metabolic panel  (Ca, Cl, CO2, Creat, Gluc, K, Na, BUN)    Other cirrhosis of liver (H)  Follows up with [facility] team.    Ascending aorta dilatation (H24)  Unfortunately, patient was lost to follow-up with the cardiology team.  Last visit with cardiologist in 2021.  Was supposed to follow-up in 2022 for repeat TTE.  Discussed with the patient on reestablishing care with the cardiology team.  Referral placed.  - Adult Cardiology Eval  Referral; Future    Type 2 diabetes mellitus with stage 3a chronic kidney disease, without long-term current use of insulin (H)  Most recent A1c lab reviewed.  Within target.  Stable.  Continues on glipizide at 2.5 mg twice daily.    Chronic atrial fibrillation (H)  Clinically stable.  On warfarin chronic anticoagulation.  Denies any bleeding concerns.  - Adult Cardiology Eval  Referral; Future    Suprapubic pain  Increasing suprapubic pain which patient has had in the past concerning for UTI.  Denies any other symptoms of UTI including frequency/urgency or dysuria.  Patient initially required with testing and later on decided to hold off on the testing since patient does have concerns of multiple antibiotic allergies as well as history of C. difficile while on antibiotic treatment.  As such, Dr. Granados with infectious disease team is the only 1 who can be giving patient antibiotics as endorsed by the patient.  Patient would like to monitor the symptoms and consider testing during follow-up visit.    Chronic kidney disease, stage 3 (H)  - Albumin  "Random Urine Quantitative with Creat Ratio        42 minutes spent by me on the date of the encounter doing chart review, history and exam, documentation and further activities per the note            Mamadou Corrales is a 83 year old, presenting for the following health issues:  Follow Up        2/8/2024    11:16 AM   Additional Questions   Roomed by Aure   Accompanied by Self     History of Present Illness       Reason for visit:  Fluid colletion in legs    She eats 0-1 servings of fruits and vegetables daily.She consumes 0 sweetened beverage(s) daily.She exercises with enough effort to increase her heart rate 9 or less minutes per day.  She exercises with enough effort to increase her heart rate 3 or less days per week.   She is taking medications regularly.                 Review of Systems  Constitutional, HEENT, cardiovascular, pulmonary, gi and gu systems are negative, except as otherwise noted.      Objective    /72 (BP Location: Right arm, Patient Position: Sitting, Cuff Size: Adult Large)   Pulse 77   Temp 97.1  F (36.2  C) (Tympanic)   Resp 18   Ht 1.727 m (5' 8\")   Wt 107.5 kg (237 lb)   LMP  (LMP Unknown)   SpO2 96%   BMI 36.04 kg/m    Body mass index is 36.04 kg/m .  Physical Exam   GENERAL: alert and no distress  RESP: lungs clear to auscultation - no rales, rhonchi or wheezes  CV: regular rate and rhythm, normal S1 S2  MS: no gross musculoskeletal defects noted, 1+ pitting edema.  Improved from last in office visit.  NEURO: Normal strength and tone, mentation intact and speech normal  PSYCH: mentation appears normal, affect normal/bright            Signed Electronically by: Taylor Payan MD    "

## 2024-02-09 LAB
ANION GAP SERPL CALCULATED.3IONS-SCNC: 9 MMOL/L (ref 7–15)
BUN SERPL-MCNC: 22.4 MG/DL (ref 8–23)
CALCIUM SERPL-MCNC: 9.5 MG/DL (ref 8.8–10.2)
CHLORIDE SERPL-SCNC: 97 MMOL/L (ref 98–107)
CREAT SERPL-MCNC: 1.02 MG/DL (ref 0.51–0.95)
DEPRECATED HCO3 PLAS-SCNC: 32 MMOL/L (ref 22–29)
EGFRCR SERPLBLD CKD-EPI 2021: 55 ML/MIN/1.73M2
GLUCOSE SERPL-MCNC: 133 MG/DL (ref 70–99)
POTASSIUM SERPL-SCNC: 4 MMOL/L (ref 3.4–5.3)
SODIUM SERPL-SCNC: 138 MMOL/L (ref 135–145)

## 2024-02-09 NOTE — TELEPHONE ENCOUNTER
"Patient calling.     Reports she has a ride lined up for 2/14/24 - she has an appointment at [facility] dept at 10:00a.  Asking if she can have a follow up appointment with primary care provider after derm appointment around 11:10 on 2/14/24.    Patient was told to schedule a follow up appointment for her bilat leg edema and labs results.    Please advise if she can be worked in 2/14/24 around 11:10a, thanks.    2.  FYI - Patient states [employer] will be sending a form for primary care provider to sign so patient can get \"Booster pads\" (absorbable sanitary-like pads). Patient states she urinates frequently due to being on multiple diuretics and wets her clothes.  She would need 1 pad BID (one during the day and one at bedtime) for a quantity of 75 per month.  "

## 2024-02-09 NOTE — TELEPHONE ENCOUNTER
Patient calls back to find out when is the soonest opening with video visit with Dr. Payan. Appointment set up.    Appointments in Next Year      Feb 13, 2024  1:00 PM  (Arrive by 12:55 PM)  Provider Visit with Taylor Payan MD  [facility] ([facility] ) [phone]     Patient also asking for recent A1c results. Relayed result of 6.1 to patient. Patient also wanted to know her kidney function tests. Relayed results to patient, but advised patient that any directions/recommendations about kidney function levels will be determined by provider. Patient verbalizes understanding of instructions and indicates no further questions at this time.    Thank you,  Dawood Ortez, Triage RN [facility]  12:05 PM 2/9/2024

## 2024-02-12 ENCOUNTER — TELEPHONE (OUTPATIENT)
Dept: INTERNAL MEDICINE | Facility: CLINIC | Age: 84
End: 2024-02-12
Payer: COMMERCIAL

## 2024-02-12 NOTE — TELEPHONE ENCOUNTER
2/12/24 physician order for Indapamide new orders recieved via fax. Form in your mailbox for signature.

## 2024-02-13 ENCOUNTER — VIRTUAL VISIT (OUTPATIENT)
Dept: INTERNAL MEDICINE | Facility: CLINIC | Age: 84
End: 2024-02-13
Payer: COMMERCIAL

## 2024-02-13 ENCOUNTER — MEDICAL CORRESPONDENCE (OUTPATIENT)
Dept: HEALTH INFORMATION MANAGEMENT | Facility: CLINIC | Age: 84
End: 2024-02-13

## 2024-02-13 DIAGNOSIS — M79.89 SWELLING OF LOWER LEG: Primary | ICD-10-CM

## 2024-02-13 DIAGNOSIS — I48.20 CHRONIC ATRIAL FIBRILLATION (H): ICD-10-CM

## 2024-02-13 DIAGNOSIS — E11.22 TYPE 2 DIABETES MELLITUS WITH STAGE 3A CHRONIC KIDNEY DISEASE, WITHOUT LONG-TERM CURRENT USE OF INSULIN (H): ICD-10-CM

## 2024-02-13 DIAGNOSIS — I48.21 PERMANENT ATRIAL FIBRILLATION (H): ICD-10-CM

## 2024-02-13 DIAGNOSIS — N18.31 TYPE 2 DIABETES MELLITUS WITH STAGE 3A CHRONIC KIDNEY DISEASE, WITHOUT LONG-TERM CURRENT USE OF INSULIN (H): ICD-10-CM

## 2024-02-13 PROCEDURE — 99214 OFFICE O/P EST MOD 30 MIN: CPT | Mod: 95 | Performed by: INTERNAL MEDICINE

## 2024-02-13 RX ORDER — WARFARIN SODIUM 2.5 MG/1
TABLET ORAL
Qty: 135 TABLET | Refills: 1 | Status: SHIPPED | OUTPATIENT
Start: 2024-02-13 | End: 2024-02-22

## 2024-02-13 RX ORDER — INDAPAMIDE 2.5 MG/1
2.5 TABLET ORAL EVERY MORNING
Qty: 90 TABLET | Refills: 1 | Status: ON HOLD | OUTPATIENT
Start: 2024-02-13 | End: 2024-08-31

## 2024-02-13 NOTE — TELEPHONE ENCOUNTER
ANTICOAGULATION MANAGEMENT:  Medication Refill    Anticoagulation Summary  As of 2/6/2024      Warfarin maintenance plan:  2.5 mg (2.5 mg x 1) every Mon; 3.75 mg (2.5 mg x 1.5) all other days   Next INR check:  2/20/2024   Target end date:  Indefinite    Indications    Permanent atrial fibrillation (H) [I48.21]  Chronic atrial fibrillation (H) [I48.20]  Long term (current) use of anticoagulants (Resolved) [Z79.01]                 Anticoagulation Care Providers       Provider Role Specialty Phone number    Patti Suárez MD Referring Internal Medicine [phone]            Refill Criteria    Visit with referring provider/group: Meets criteria: office visit within referring provider group in the last 1 year on 1/12/24 virtual & 9/21/23 In person    [facility] referral signed last signed: 08/29/2023; within last year: Yes    Lab monitoring not exceeding 2 weeks overdue: Yes    Savanna meets all criteria for refill. Rx instructions and quantity supplied updated to match patient's current dosing plan. Warfarin 90 day supply with 1 refill granted per [facility] protocol     Sanjana Fox RN  Anticoagulation Clinic

## 2024-02-13 NOTE — PROGRESS NOTES
Savanna is a 83 year old who is being evaluated via a billable video visit.      How would you like to obtain your AVS? MyChart  If the video visit is dropped, the invitation should be resent by: Text to cell phone: [phone]  Will anyone else be joining your video visit? No          Assessment & Plan     Swelling of lower leg  Overall, symptoms have improved.  Discussed with the patient on continuation of indapamide at 2.5 mg daily.  Use of Lasix at 20 mg as needed for increased lower leg swelling or weight gain concerns of greater than 3 pounds per day or 5 pounds per week.  Most recent electrolytes/creatinine check reviewed with the patient.  Potassium and sodium level stable on blood work.  - indapamide (LOZOL) 2.5 MG tablet; Take 1 tablet (2.5 mg) by mouth every morning    Type 2 diabetes mellitus with stage 3a chronic kidney disease, without long-term current use of insulin (H)  Most recent A1c reviewed with the patient.  Continue glipizide at the current dose of 2.5 mg  2 times daily.    Chronic atrial fibrillation (H)  Continues on warfarin medication. Referral pending for cardiology appointment.  Patient recommended to schedule appointment.                  Subjective   Savanna is a 83 year old, presenting for the following health issues:  Follow Up (Potassium question )        2/13/2024     9:38 AM   Additional Questions   Roomed by Aure   Accompanied by Self     History of Present Illness       Reason for visit:  Fluid colletion in legs    She eats 0-1 servings of fruits and vegetables daily.She consumes 0 sweetened beverage(s) daily.She exercises with enough effort to increase her heart rate 9 or less minutes per day.  She exercises with enough effort to increase her heart rate 3 or less days per week.   She is taking medications regularly.               Review of Systems  Constitutional, HEENT, cardiovascular, pulmonary, gi and gu systems are negative, except as otherwise noted.      Objective            Vitals:  No vitals were obtained today due to virtual visit.    Physical Exam   GENERAL: alert and no distress  EYES: Eyes grossly normal to inspection.  No discharge or erythema, or obvious scleral/conjunctival abnormalities.  RESP: No audible wheeze, cough, or visible cyanosis.    SKIN: Visible skin clear. No significant rash, abnormal pigmentation or lesions.  NEURO: Cranial nerves grossly intact.  Mentation and speech appropriate for age.  PSYCH: Appropriate affect, tone, and pace of words          Video-Visit Details    Type of service:  Video Visit       Originating Location (pt. Location): Home    Distant Location (provider location):  On-site  Platform used for Video Visit: Donte  Signed Electronically by: Taylor Payan MD

## 2024-02-14 ENCOUNTER — TELEPHONE (OUTPATIENT)
Dept: ANTICOAGULATION | Facility: CLINIC | Age: 84
End: 2024-02-14

## 2024-02-14 NOTE — TELEPHONE ENCOUNTER
Home care nurse Raj left voicemail stating patient is due for INR recheck on 2/20/24 and home care is unable to see patient on that day, will check INR on 2/21/24.      Noted, chart updated with new recheck date.      Lucia Mai RN  [facility].

## 2024-02-18 ENCOUNTER — NURSE TRIAGE (OUTPATIENT)
Dept: NURSING | Facility: CLINIC | Age: 84
End: 2024-02-18
Payer: COMMERCIAL

## 2024-02-18 DIAGNOSIS — R19.7 DIARRHEA, UNSPECIFIED TYPE: Primary | ICD-10-CM

## 2024-02-18 NOTE — TELEPHONE ENCOUNTER
"Nurse Triage SBAR    Is this a 2nd Level Triage? No    Situation/Background: Patient is calling with concern of difficulty with opening the packaging containing imodium tablets. Patient description sounds like a blister pack); the patient states the size of the tablet is very small and is difficult for her to manage. Patient states she needs to open the blister pack with a scissors.     Patient is requesting Rx for imodium in loose \"gel tablet\" form to be dispensed in a bottle.     Assessment:   No triage    Recommendation: Per disposition, Call PCP when office is open. Advised patient that her concern would be routed to PCP Care team to address during office hours. Reviewed Care Advice with patient and verbalizes understanding. Declines additional questions. Advised patient to call back with any new or worsening symptoms. Patient verbalized understanding and agrees with plan.     Protocol Recommended Disposition: Telephone advice    Maira Madrigal RN on 2/18/2024 at 5:33 PM  [facility] Nurse Advisors  Reason for Disposition   [1] Caller has NON-URGENT medicine question about med that PCP prescribed AND [2] triager unable to answer question    Protocols used: Medication Question Call-A-AH    "

## 2024-02-19 NOTE — TELEPHONE ENCOUNTER
Please see RN note below. Patient requesting rx for immodium due to difficulty opening blister pack she currently has.

## 2024-02-20 RX ORDER — LOPERAMIDE HCL 2 MG
2 CAPSULE ORAL 4 TIMES DAILY PRN
Qty: 60 CAPSULE | Refills: 1 | Status: SHIPPED | OUTPATIENT
Start: 2024-02-20 | End: 2024-02-20

## 2024-02-20 RX ORDER — LOPERAMIDE HCL 2 MG
2 CAPSULE ORAL 4 TIMES DAILY PRN
Qty: 60 CAPSULE | Refills: 0 | Status: SHIPPED | OUTPATIENT
Start: 2024-02-20 | End: 2024-03-26

## 2024-02-20 NOTE — TELEPHONE ENCOUNTER
Left message on patient's voicemail that the requested prescription has been approved and sent to her pharmacy by Dr. Payan.  BENITA Graham R.N.

## 2024-02-21 ENCOUNTER — TELEPHONE (OUTPATIENT)
Dept: ANTICOAGULATION | Facility: CLINIC | Age: 84
End: 2024-02-21
Payer: COMMERCIAL

## 2024-02-21 DIAGNOSIS — I48.21 PERMANENT ATRIAL FIBRILLATION (H): Primary | ICD-10-CM

## 2024-02-21 DIAGNOSIS — I48.20 CHRONIC ATRIAL FIBRILLATION (H): ICD-10-CM

## 2024-02-21 NOTE — TELEPHONE ENCOUNTER
Called and informed Raj of the dosing and ok for recheck on 2/27/2024.    Updated the calendar to reflect the new recheck date.    Mahsa Scott RN    [facility] Anticoagulation [facility]

## 2024-02-21 NOTE — TELEPHONE ENCOUNTER
Patient was to have INR today. When [facility] home care, called patient is refusing the visit. Patient is wanting to be seen on 2/27/2024.    Informed will need to have [facility] review for dosing since almost a week till recheck would be done.    Mahsa Scott RN    [facility] Anticoagulation Clinic

## 2024-02-21 NOTE — TELEPHONE ENCOUNTER
Chart reviewed with [facility] RN at time of encounter    Advise continue current maintenance dose and recheck INR 02/27/2024    Jayleen Oconnor, PharmD BCACP  Anticoagulation Clinical Pharmacist

## 2024-02-22 ENCOUNTER — NURSE TRIAGE (OUTPATIENT)
Dept: INTERNAL MEDICINE | Facility: CLINIC | Age: 84
End: 2024-02-22
Payer: COMMERCIAL

## 2024-02-22 ENCOUNTER — APPOINTMENT (OUTPATIENT)
Dept: GENERAL RADIOLOGY | Facility: CLINIC | Age: 84
DRG: 291 | End: 2024-02-22
Attending: EMERGENCY MEDICINE
Payer: COMMERCIAL

## 2024-02-22 ENCOUNTER — HOSPITAL ENCOUNTER (INPATIENT)
Facility: CLINIC | Age: 84
LOS: 1 days | Discharge: HOME | DRG: 291 | End: 2024-02-24
Attending: EMERGENCY MEDICINE | Admitting: HOSPITALIST
Payer: COMMERCIAL

## 2024-02-22 ENCOUNTER — TELEPHONE (OUTPATIENT)
Dept: INTERNAL MEDICINE | Facility: CLINIC | Age: 84
End: 2024-02-22

## 2024-02-22 DIAGNOSIS — I50.9 CONGESTIVE HEART FAILURE, UNSPECIFIED HF CHRONICITY, UNSPECIFIED HEART FAILURE TYPE (H): ICD-10-CM

## 2024-02-22 DIAGNOSIS — E87.70 HYPERVOLEMIA, UNSPECIFIED HYPERVOLEMIA TYPE: ICD-10-CM

## 2024-02-22 DIAGNOSIS — M79.89 SWELLING OF LOWER LEG: ICD-10-CM

## 2024-02-22 LAB
ALBUMIN SERPL BCG-MCNC: 3.7 G/DL (ref 3.5–5.2)
ALBUMIN UR-MCNC: NEGATIVE MG/DL
ALP SERPL-CCNC: 132 U/L (ref 40–150)
ALT SERPL W P-5'-P-CCNC: 26 U/L (ref 0–50)
ANION GAP SERPL CALCULATED.3IONS-SCNC: 7 MMOL/L (ref 7–15)
ANION GAP SERPL CALCULATED.3IONS-SCNC: 8 MMOL/L (ref 7–15)
APPEARANCE UR: CLEAR
AST SERPL W P-5'-P-CCNC: 46 U/L (ref 0–45)
BACTERIA #/AREA URNS HPF: ABNORMAL /HPF
BASOPHILS # BLD AUTO: 0.1 10E3/UL (ref 0–0.2)
BASOPHILS NFR BLD AUTO: 1 %
BILIRUB DIRECT SERPL-MCNC: 0.34 MG/DL (ref 0–0.3)
BILIRUB SERPL-MCNC: 1.2 MG/DL
BILIRUB UR QL STRIP: NEGATIVE
BUN SERPL-MCNC: 18.9 MG/DL (ref 8–23)
BUN SERPL-MCNC: 20.6 MG/DL (ref 8–23)
CALCIUM SERPL-MCNC: 9.1 MG/DL (ref 8.8–10.2)
CALCIUM SERPL-MCNC: 9.4 MG/DL (ref 8.8–10.2)
CAOX CRY #/AREA URNS HPF: ABNORMAL /HPF
CHLORIDE SERPL-SCNC: 98 MMOL/L (ref 98–107)
CHLORIDE SERPL-SCNC: 98 MMOL/L (ref 98–107)
COLOR UR AUTO: ABNORMAL
CREAT SERPL-MCNC: 0.96 MG/DL (ref 0.51–0.95)
CREAT SERPL-MCNC: 1.04 MG/DL (ref 0.51–0.95)
DEPRECATED HCO3 PLAS-SCNC: 29 MMOL/L (ref 22–29)
DEPRECATED HCO3 PLAS-SCNC: 33 MMOL/L (ref 22–29)
EGFRCR SERPLBLD CKD-EPI 2021: 54 ML/MIN/1.73M2
EGFRCR SERPLBLD CKD-EPI 2021: 59 ML/MIN/1.73M2
EOSINOPHIL # BLD AUTO: 0.3 10E3/UL (ref 0–0.7)
EOSINOPHIL NFR BLD AUTO: 5 %
ERYTHROCYTE [DISTWIDTH] IN BLOOD BY AUTOMATED COUNT: 14.2 % (ref 10–15)
GLUCOSE SERPL-MCNC: 117 MG/DL (ref 70–99)
GLUCOSE SERPL-MCNC: 163 MG/DL (ref 70–99)
GLUCOSE UR STRIP-MCNC: NEGATIVE MG/DL
HCT VFR BLD AUTO: 36.2 % (ref 35–47)
HGB BLD-MCNC: 11.9 G/DL (ref 11.7–15.7)
HGB UR QL STRIP: NEGATIVE
HOLD SPECIMEN: NORMAL
HOLD SPECIMEN: NORMAL
IMM GRANULOCYTES # BLD: 0 10E3/UL
IMM GRANULOCYTES NFR BLD: 0 %
INR PPP: 1.95 (ref 0.85–1.15)
KETONES UR STRIP-MCNC: NEGATIVE MG/DL
LEUKOCYTE ESTERASE UR QL STRIP: ABNORMAL
LYMPHOCYTES # BLD AUTO: 1.6 10E3/UL (ref 0.8–5.3)
LYMPHOCYTES NFR BLD AUTO: 31 %
MAGNESIUM SERPL-MCNC: 1.6 MG/DL (ref 1.7–2.3)
MCH RBC QN AUTO: 30.4 PG (ref 26.5–33)
MCHC RBC AUTO-ENTMCNC: 32.9 G/DL (ref 31.5–36.5)
MCV RBC AUTO: 92 FL (ref 78–100)
MONOCYTES # BLD AUTO: 0.7 10E3/UL (ref 0–1.3)
MONOCYTES NFR BLD AUTO: 13 %
NEUTROPHILS # BLD AUTO: 2.5 10E3/UL (ref 1.6–8.3)
NEUTROPHILS NFR BLD AUTO: 50 %
NITRATE UR QL: POSITIVE
NRBC # BLD AUTO: 0 10E3/UL
NRBC BLD AUTO-RTO: 0 /100
NT-PROBNP SERPL-MCNC: 1177 PG/ML (ref 0–1800)
PH UR STRIP: 6.5 [PH] (ref 5–7)
PHOSPHATE SERPL-MCNC: 2.8 MG/DL (ref 2.5–4.5)
PLATELET # BLD AUTO: 117 10E3/UL (ref 150–450)
POTASSIUM SERPL-SCNC: 3.8 MMOL/L (ref 3.4–5.3)
POTASSIUM SERPL-SCNC: 4.1 MMOL/L (ref 3.4–5.3)
PROT SERPL-MCNC: 7.7 G/DL (ref 6.4–8.3)
RBC # BLD AUTO: 3.92 10E6/UL (ref 3.8–5.2)
RBC URINE: 1 /HPF
SODIUM SERPL-SCNC: 135 MMOL/L (ref 135–145)
SODIUM SERPL-SCNC: 138 MMOL/L (ref 135–145)
SP GR UR STRIP: 1.01 (ref 1–1.03)
TROPONIN T SERPL HS-MCNC: 26 NG/L
TROPONIN T SERPL HS-MCNC: 26 NG/L
UROBILINOGEN UR STRIP-MCNC: NORMAL MG/DL
WBC # BLD AUTO: 5.1 10E3/UL (ref 4–11)
WBC URINE: 16 /HPF

## 2024-02-22 PROCEDURE — 36415 COLL VENOUS BLD VENIPUNCTURE: CPT | Performed by: HOSPITALIST

## 2024-02-22 PROCEDURE — 82248 BILIRUBIN DIRECT: CPT | Performed by: EMERGENCY MEDICINE

## 2024-02-22 PROCEDURE — 82962 GLUCOSE BLOOD TEST: CPT

## 2024-02-22 PROCEDURE — 93005 ELECTROCARDIOGRAM TRACING: CPT | Mod: 76

## 2024-02-22 PROCEDURE — 250N000012 HC RX MED GY IP 250 OP 636 PS 637: Performed by: HOSPITALIST

## 2024-02-22 PROCEDURE — 84484 ASSAY OF TROPONIN QUANT: CPT | Performed by: EMERGENCY MEDICINE

## 2024-02-22 PROCEDURE — 83735 ASSAY OF MAGNESIUM: CPT | Performed by: NURSE PRACTITIONER

## 2024-02-22 PROCEDURE — G0378 HOSPITAL OBSERVATION PER HR: HCPCS

## 2024-02-22 PROCEDURE — 71046 X-RAY EXAM CHEST 2 VIEWS: CPT

## 2024-02-22 PROCEDURE — 99291 CRITICAL CARE FIRST HOUR: CPT | Mod: 25

## 2024-02-22 PROCEDURE — 84484 ASSAY OF TROPONIN QUANT: CPT | Performed by: HOSPITALIST

## 2024-02-22 PROCEDURE — 93010 ELECTROCARDIOGRAM REPORT: CPT | Performed by: INTERNAL MEDICINE

## 2024-02-22 PROCEDURE — 36415 COLL VENOUS BLD VENIPUNCTURE: CPT | Performed by: EMERGENCY MEDICINE

## 2024-02-22 PROCEDURE — 85025 COMPLETE CBC W/AUTO DIFF WBC: CPT | Performed by: EMERGENCY MEDICINE

## 2024-02-22 PROCEDURE — 250N000011 HC RX IP 250 OP 636: Performed by: EMERGENCY MEDICINE

## 2024-02-22 PROCEDURE — 81001 URINALYSIS AUTO W/SCOPE: CPT | Performed by: EMERGENCY MEDICINE

## 2024-02-22 PROCEDURE — 83880 ASSAY OF NATRIURETIC PEPTIDE: CPT | Performed by: EMERGENCY MEDICINE

## 2024-02-22 PROCEDURE — 93005 ELECTROCARDIOGRAM TRACING: CPT

## 2024-02-22 PROCEDURE — 87086 URINE CULTURE/COLONY COUNT: CPT | Performed by: EMERGENCY MEDICINE

## 2024-02-22 PROCEDURE — 85610 PROTHROMBIN TIME: CPT | Performed by: EMERGENCY MEDICINE

## 2024-02-22 PROCEDURE — 250N000013 HC RX MED GY IP 250 OP 250 PS 637: Performed by: HOSPITALIST

## 2024-02-22 PROCEDURE — 80053 COMPREHEN METABOLIC PANEL: CPT | Performed by: EMERGENCY MEDICINE

## 2024-02-22 PROCEDURE — 99223 1ST HOSP IP/OBS HIGH 75: CPT | Performed by: HOSPITALIST

## 2024-02-22 PROCEDURE — 36415 COLL VENOUS BLD VENIPUNCTURE: CPT | Performed by: NURSE PRACTITIONER

## 2024-02-22 PROCEDURE — 84100 ASSAY OF PHOSPHORUS: CPT | Performed by: NURSE PRACTITIONER

## 2024-02-22 PROCEDURE — 96374 THER/PROPH/DIAG INJ IV PUSH: CPT

## 2024-02-22 PROCEDURE — 85004 AUTOMATED DIFF WBC COUNT: CPT | Performed by: EMERGENCY MEDICINE

## 2024-02-22 RX ORDER — FUROSEMIDE 10 MG/ML
40 INJECTION INTRAMUSCULAR; INTRAVENOUS
Status: DISCONTINUED | OUTPATIENT
Start: 2024-02-23 | End: 2024-02-23

## 2024-02-22 RX ORDER — ACETAMINOPHEN 650 MG/1
650 SUPPOSITORY RECTAL EVERY 4 HOURS PRN
Status: DISCONTINUED | OUTPATIENT
Start: 2024-02-22 | End: 2024-02-23

## 2024-02-22 RX ORDER — FUROSEMIDE 10 MG/ML
40 INJECTION INTRAMUSCULAR; INTRAVENOUS ONCE
Status: COMPLETED | OUTPATIENT
Start: 2024-02-22 | End: 2024-02-22

## 2024-02-22 RX ORDER — WARFARIN SODIUM 1 MG/1
3 TABLET ORAL
Status: COMPLETED | OUTPATIENT
Start: 2024-02-22 | End: 2024-02-22

## 2024-02-22 RX ORDER — WARFARIN SODIUM 2.5 MG/1
2.5 TABLET ORAL WEEKLY
Status: ON HOLD | COMMUNITY
End: 2024-08-31

## 2024-02-22 RX ORDER — AMOXICILLIN 250 MG
2 CAPSULE ORAL 2 TIMES DAILY PRN
Status: DISCONTINUED | OUTPATIENT
Start: 2024-02-22 | End: 2024-02-24 | Stop reason: HOSPADM

## 2024-02-22 RX ORDER — NICOTINE POLACRILEX 4 MG
15-30 LOZENGE BUCCAL
Status: DISCONTINUED | OUTPATIENT
Start: 2024-02-22 | End: 2024-02-24 | Stop reason: HOSPADM

## 2024-02-22 RX ORDER — DEXTROSE MONOHYDRATE 25 G/50ML
25-50 INJECTION, SOLUTION INTRAVENOUS
Status: DISCONTINUED | OUTPATIENT
Start: 2024-02-22 | End: 2024-02-24 | Stop reason: HOSPADM

## 2024-02-22 RX ORDER — ESCITALOPRAM OXALATE 5 MG/1
5 TABLET ORAL AT BEDTIME
Status: DISCONTINUED | OUTPATIENT
Start: 2024-02-22 | End: 2024-02-24 | Stop reason: HOSPADM

## 2024-02-22 RX ORDER — AMOXICILLIN 250 MG
1 CAPSULE ORAL 2 TIMES DAILY PRN
Status: DISCONTINUED | OUTPATIENT
Start: 2024-02-22 | End: 2024-02-24 | Stop reason: HOSPADM

## 2024-02-22 RX ORDER — GLIPIZIDE 2.5 MG/1
2.5 TABLET, EXTENDED RELEASE ORAL 2 TIMES DAILY
Status: DISCONTINUED | OUTPATIENT
Start: 2024-02-22 | End: 2024-02-24 | Stop reason: HOSPADM

## 2024-02-22 RX ORDER — ONDANSETRON 4 MG/1
4 TABLET, ORALLY DISINTEGRATING ORAL EVERY 6 HOURS PRN
Status: DISCONTINUED | OUTPATIENT
Start: 2024-02-22 | End: 2024-02-24 | Stop reason: HOSPADM

## 2024-02-22 RX ORDER — ONDANSETRON 2 MG/ML
4 INJECTION INTRAMUSCULAR; INTRAVENOUS EVERY 6 HOURS PRN
Status: DISCONTINUED | OUTPATIENT
Start: 2024-02-22 | End: 2024-02-24 | Stop reason: HOSPADM

## 2024-02-22 RX ORDER — ACETAMINOPHEN 325 MG/1
650 TABLET ORAL EVERY 4 HOURS PRN
Status: DISCONTINUED | OUTPATIENT
Start: 2024-02-22 | End: 2024-02-23

## 2024-02-22 RX ADMIN — ESCITALOPRAM 5 MG: 5 TABLET, FILM COATED ORAL at 22:57

## 2024-02-22 RX ADMIN — INSULIN ASPART 2 UNITS: 100 INJECTION, SOLUTION INTRAVENOUS; SUBCUTANEOUS at 22:57

## 2024-02-22 RX ADMIN — FUROSEMIDE 40 MG: 10 INJECTION, SOLUTION INTRAMUSCULAR; INTRAVENOUS at 16:44

## 2024-02-22 RX ADMIN — WARFARIN SODIUM 3 MG: 1 TABLET ORAL at 21:57

## 2024-02-22 RX ADMIN — GLIPIZIDE 2.5 MG: 2.5 TABLET, FILM COATED, EXTENDED RELEASE ORAL at 20:28

## 2024-02-22 ASSESSMENT — COLUMBIA-SUICIDE SEVERITY RATING SCALE - C-SSRS
1. IN THE PAST MONTH, HAVE YOU WISHED YOU WERE DEAD OR WISHED YOU COULD GO TO SLEEP AND NOT WAKE UP?: NO
2. HAVE YOU ACTUALLY HAD ANY THOUGHTS OF KILLING YOURSELF IN THE PAST MONTH?: NO
6. HAVE YOU EVER DONE ANYTHING, STARTED TO DO ANYTHING, OR PREPARED TO DO ANYTHING TO END YOUR LIFE?: NO

## 2024-02-22 ASSESSMENT — ACTIVITIES OF DAILY LIVING (ADL)
ADLS_ACUITY_SCORE: 38
ADLS_ACUITY_SCORE: 41
ADLS_ACUITY_SCORE: 34
ADLS_ACUITY_SCORE: 38

## 2024-02-22 NOTE — ED PROVIDER NOTES
History     Chief Complaint:  Leg Swelling       HPI   Savanna Rehman is a 83 year old female with hx of CKD afib peripheral edema.  She has noticed in last 3 days 14# of water weight gain and increased LE edema.  As well subjective feeling of inability to take a deep breath.  No CP cough or mike dyspnea.  No abd pain NVD.  Feels constant need to turinate and some dysuria without hematuria.  No fever chills.        Independent Historian:        Review of External Notes:  PCP virtual visit earlier this week  Swelling of lower leg  Overall, symptoms have improved.  Discussed with the patient on continuation of indapamide at 2.5 mg daily.  Use of Lasix at 20 mg as needed for increased lower leg swelling or weight gain concerns of greater than 3 pounds per day or 5 pounds per week.  Most recent electrolytes/creatinine check reviewed with the patient.  Potassium and sodium level stable on blood work.  - indapamide (LOZOL) 2.5 MG tablet; Take 1 tablet (2.5 mg) by mouth every morning     Type 2 diabetes mellitus with stage 3a chronic kidney disease, without long-term current use of insulin (H)  Most recent A1c reviewed with the patient.  Continue glipizide at the current dose of 2.5 mg  2 times daily.     Chronic atrial fibrillation (H)  Continues on warfarin medication. Referral pending for cardiology appointment.  Patient recommended to schedule appointment.    Medications:    acetaminophen (TYLENOL) 500 MG tablet  butalbital-aspirin-caffeine (FIORINAL) -40 MG capsule  Cholecalciferol (VITAMIN D-3) 125 MCG (5000 UT) TABS  escitalopram (LEXAPRO) 5 MG tablet  furosemide (LASIX) 20 MG tablet  glipiZIDE (GLUCOTROL XL) 2.5 MG 24 hr tablet  indapamide (LOZOL) 2.5 MG tablet  loperamide (IMODIUM A-D) 2 MG capsule  loperamide (IMODIUM A-D) 2 MG tablet  warfarin ANTICOAGULANT (COUMADIN) 2.5 MG tablet        Past Medical History:    Past Medical History:   Diagnosis Date    Acute encephalopathy 09/21/2023    Anemia      Atrial fibrillation (H)     CAD (coronary artery disease)     Chronic pain     Congestive heart failure (H)     Degenerative disk disease     Diabetes (H)     Fibromyalgia     Gastro-oesophageal reflux disease     Gout     Hiatal hernia     Mumps     Neuropathy     CRISTIANA (obstructive sleep apnea) - CPAP     Palpitations     Pernicious anemia     Sleep apnea     Urinary incontinence     Vitamin D deficiency        Past Surgical History:    Past Surgical History:   Procedure Laterality Date    APPENDECTOMY      CHOLECYSTECTOMY      COLONOSCOPY  3/15/2011    COLONOSCOPY N/A 3/15/2023    Procedure: COLONOSCOPY, WITH POLYPECTOMY AND BIOPSY;  Surgeon: Maci Coronel MD;  Location:  GI    COLONOSCOPY N/A 3/15/2023    Procedure: COLONOSCOPY, FLEXIBLE, WITH LESION REMOVAL USING SNARE;  Surgeon: Maci Coronel MD;  Location:  GI    CORONARY ANGIOGRAPHY ADULT ORDER      CYSTOSCOPY, BIOPSY BLADDER, COMBINED N/A 7/13/2020    Procedure: CYSTOSCOPY, WITH BLADDER BIOPSY;  Surgeon: Deisy Wilson MD;  Location: [facility] OR    HEART CATH LEFT HEART CATH  12/30/16    medication management    HYSTERECTOMY TOTAL ABDOMINAL      Knee replacement NOS Left     LAPAROSCOPIC NISSEN FUNDOPLICATION N/A 2/4/2015    Procedure: LAPAROSCOPIC NISSEN FUNDOPLICATION;  Surgeon: Armando Ansari MD;  Location:  OR    TONSILLECTOMY      TRANSPOSITION ULNAR NERVE (ELBOW)            Physical Exam   Patient Vitals for the past 24 hrs:   BP Temp Temp src Pulse Resp SpO2 Weight   02/22/24 1645 (!) 160/96 -- -- 65 15 100 % --   02/22/24 1330 -- -- -- -- -- -- 114.2 kg (251 lb 12.3 oz)   02/22/24 1329 (!) 143/81 -- -- -- -- 100 % --   02/22/24 1328 -- 97.6  F (36.4  C) Oral 56 18 -- --        Physical Exam  General: Patient is well appearing. No distress.  Head: Atraumatic.  Eyes: Conjunctivae and EOM are normal. No scleral icterus.  Neck: Normal range of motion. Neck supple.   Cardiovascular: irreg rate, regular rhythm, normal heart  sounds and intact distal pulses.   Pulmonary/Chest: Breath sounds normal. No respiratory distress.  Abdominal: Soft. Bowel sounds are normal. No distension. No tenderness. No rebound or guarding.   Musculoskeletal: Normal range of motion.  Diffuse nonpitting LE edema bilaterally form knee to toes.    Skin: Warm and dry. No rash noted. Not diaphoretic.      Emergency Department Course   ECG  Afib HR 56    Imaging:  XR Chest 2 Views   Final Result   IMPRESSION: Stable enlargement of the cardiac silhouette. Subtle areas of fibrosis without mike airspace consolidation or edema. No significant pleural effusion or pneumothorax. Bones are unchanged.        Report per radiology    Laboratory:  Labs Ordered and Resulted from Time of ED Arrival to Time of ED Departure   BASIC METABOLIC PANEL - Abnormal       Result Value    Sodium 135      Potassium 3.8      Chloride 98      Carbon Dioxide (CO2) 29      Anion Gap 8      Urea Nitrogen 18.9      Creatinine 0.96 (*)     GFR Estimate 59 (*)     Calcium 9.1      Glucose 117 (*)    CBC WITH PLATELETS AND DIFFERENTIAL - Abnormal    WBC Count 5.1      RBC Count 3.92      Hemoglobin 11.9      Hematocrit 36.2      MCV 92      MCH 30.4      MCHC 32.9      RDW 14.2      Platelet Count 117 (*)     % Neutrophils 50      % Lymphocytes 31      % Monocytes 13      % Eosinophils 5      % Basophils 1      % Immature Granulocytes 0      NRBCs per 100 WBC 0      Absolute Neutrophils 2.5      Absolute Lymphocytes 1.6      Absolute Monocytes 0.7      Absolute Eosinophils 0.3      Absolute Basophils 0.1      Absolute Immature Granulocytes 0.0      Absolute NRBCs 0.0     TROPONIN T, HIGH SENSITIVITY - Abnormal    Troponin T, High Sensitivity 26 (*)    HEPATIC FUNCTION PANEL - Abnormal    Protein Total 7.7      Albumin 3.7      Bilirubin Total 1.2      Alkaline Phosphatase 132      AST 46 (*)     ALT 26      Bilirubin Direct 0.34 (*)    INR - Abnormal    INR 1.95 (*)    NT PROBNP INPATIENT - Normal     N terminal Pro BNP Inpatient 1,177     ROUTINE UA WITH MICROSCOPIC REFLEX TO CULTURE        Procedures       Emergency Department Course & Assessments:             Interventions:  Medications   furosemide (LASIX) injection 40 mg (40 mg Intravenous $Given 2/22/24 6461)        Assessments:      Independent Interpretation (X-rays, CTs, rhythm strip):  CXR cardiomegaly no effusions.      Consultations/Discussion of Management or Tests:  Hospitalist       Social Determinants of Health affecting care:       Disposition:  The patient was admitted to the hospital under the care of hospitalist    Impression & Plan        Medical Decision Making:  Savanna Rehman is a 83 year old female with a PMH of CHF who presents for evaluation of weight gain 14# in 3 days peripheral edema and subjective dyspnea without oxygen needs.   I considered a broad differential of their dyspnea including CHF exacerbation, PE, dissection, hemothorax, pleural effusion, pneumonia, acute coronary syndrome, reactive airway disease, bronchitis, upper airway obstruction, foreign body, etc.  Given the history and exam plus laboratory findings I feel congestive heart failure is the most likely etiology and would not do extensive workup for other causes as mentioned above at this time.  The patient received IV diuretics in ED and felt improved; will start I/O's here in ED.  Will admit at this time for further cares.  Has hx of chronically elev trop in normal range historically for her.  As well elev BNP in lower range for her.     Diagnosis:    ICD-10-CM    1. Hypervolemia, unspecified hypervolemia type  E87.70       2. Congestive heart failure, unspecified HF chronicity, unspecified heart failure type (H)  I50.9            Discharge Medications:  New Prescriptions    No medications on file            2/22/2024   Carter Florez MD Stevens, Andrew C, MD  02/22/24 [id number]

## 2024-02-22 NOTE — TELEPHONE ENCOUNTER
Patient will need to be seen sooner for evaluation of possible cellulitis, fluid overload or need for lower extremity ultrasound.  Patient has in the past communicated difficulty with transport so recommend visit to the nearest urgent care for further evaluation.  Also, patient is comfortable with antibiotics only coming from the infectious disease doctor, Dr. Granados.

## 2024-02-22 NOTE — H&P
[facility]    History and Physical  Hospitalist       Date of Admission:  2/22/2024    Assessment & Plan   Savanna Rehman is a 83 year old female with a past medical history of chronic A-fib on Coumadin, hypertension, heart failure with preserved ejection fraction, type 2 diabetes mellitus, CRISTIANA with noncompliance who presents with lower extremity edema and weight gain.    #Lower extremity edema, subjective SOB suspect secondary to volume overload. Acute on chronic HFpEF: Patient notes that she has gained 14 pounds in the last 3 to 4 days.  She has noted increased lower extremity swelling.  She has had some shortness of breath with exertion.  No chest pain.  No increased nausea or vomiting but does have some nausea at baseline.  No abdominal pain.  Denies any fevers or chills.  No lightheadedness or presyncopal symptoms.  She denies any dysuria or frequency of urination.  She has been on diuretic at home.  She does note that she does not follow a strict low-salt diet though has been instructed she should.  She tells me she has been told she should be using CPAP but does not use it.  Denies any sick contacts.  No recent travel.  Lives in independent living with her .  -ED, patient afebrile and nontachycardic.  Normotensive.  Saturating well on room air.  CBC notable for platelet count of 117.  BMP with renal function at baseline.  BNP not significantly elevated at 1200.  High-sensitivity troponin mildly elevated at 26.  Chest x-ray done without any acute infiltrate.  Patient was given 40 mg of IV Lasix.  -Last echocardiogram from February 2020 showed EF 55 to 60%.  We will repeat echocardiogram  -We will continue 40 mg IV Lasix twice daily.  Monitor I's and O's and daily weights.  -Cardiac diet.    #Mild elevation of Hs troponin: No chest pain. ECG with afib and slow ventricular rate.  Repeat troponin to ensure no significant uptrend. Getting TTE as above.    #Thrombocytopenia: Has had  some thrombocytopenia in past based on labs.  No bleeding noted. Hgb OK. May be related to hepatic congestion from right-sided heart failure.     #Afib with slow ventricular response. Coagulopathy secondary to warfarin: ECG as above with afib and rates in 50's.  Not on rate-controlling meds. Continue warfarin pharm to dose.  Monitor heart rates on telemetry overnight.    #Diabetes mellitus: Continue home glipizide.  Sliding scale insulin.    #GOULD: Patient tells me she has been diagnosed with Gould cirrhosis.  She will follow-up with her PCP.    #CRISTIANA, noncompliant with CPAP: Encouraged use of CPAP.  Discussed adverse effects on cardiac health with not using CPAP.    DVT Prophylaxis: Warfarin  Code Status: Full Code.  Discussed with patient.  She is going to discuss further with her .  Dispo: [place] to observation, telemetry.     Taye Alcala MD    Primary Care Physician   Taylor Payan    Chief Complaint   Weight gain, leg swelling    History is obtained from the patient, patient's chart and discussed with ER physician    History of Present Illness   Savanna Rehman is a 83 year old female with a past medical history of chronic A-fib on Coumadin, hypertension, heart failure with preserved ejection fraction, type 2 diabetes mellitus, CRISTIANA with noncompliance who presents with lower extremity edema and weight gain.    Patient notes that she has gained 14 pounds in the last 3 to 4 days.  She has noted increased lower extremity swelling.  She has had some shortness of breath with exertion.  No chest pain.  No increased nausea or vomiting but does have some nausea at baseline.  No abdominal pain.  Denies any fevers or chills.  No lightheadedness or presyncopal symptoms.  She denies any dysuria or frequency of urination.  She has been on diuretic at home.  She does note that she does not follow a strict low-salt diet though has been instructed she should.  She tells me she has been told she should be using CPAP but  does not use it.  Denies any sick contacts.  No recent travel.  Lives in independent living with her .    In the ED, patient afebrile and nontachycardic.  Normotensive.  Saturating well on room air.  CBC notable for platelet count of 117.  BMP with renal function at baseline.  BNP not significantly elevated at 1200.  High-sensitivity troponin mildly elevated at 26.  Chest x-ray done without any acute infiltrate.  Patient was given 40 mg of IV Lasix.    Past Medical History    I have reviewed this patient's medical history and updated it with pertinent information if needed.   Past Medical History:   Diagnosis Date    Acute encephalopathy 09/21/2023    Anemia     Iron Deficiency anemia    Atrial fibrillation (H)     CAD (coronary artery disease)     non-obstructive    Chronic pain     neck, low back, legs    Congestive heart failure (H)     Degenerative disk disease     Diabetes (H)     Fibromyalgia     Gastro-oesophageal reflux disease     Gout     Hiatal hernia     Mumps     Neuropathy     CRISTIANA (obstructive sleep apnea) - CPAP     Palpitations     Pernicious anemia     Sleep apnea     uses CPAP.    Urinary incontinence     Vitamin D deficiency        Past Surgical History   I have reviewed this patient's surgical history and updated it with pertinent information if needed.  Past Surgical History:   Procedure Laterality Date    APPENDECTOMY      CHOLECYSTECTOMY      COLONOSCOPY  3/15/2011    COLONOSCOPY N/A 3/15/2023    Procedure: COLONOSCOPY, WITH POLYPECTOMY AND BIOPSY;  Surgeon: Maci Coronel MD;  Location: [facility]    COLONOSCOPY N/A 3/15/2023    Procedure: COLONOSCOPY, FLEXIBLE, WITH LESION REMOVAL USING SNARE;  Surgeon: Maci Coronel MD;  Location: [facility]    CORONARY ANGIOGRAPHY ADULT ORDER      CYSTOSCOPY, BIOPSY BLADDER, COMBINED N/A 7/13/2020    Procedure: CYSTOSCOPY, WITH BLADDER BIOPSY;  Surgeon: Deisy Wilson MD;  Location:  OR    HEART CATH LEFT HEART CATH  12/30/16     medication management    HYSTERECTOMY TOTAL ABDOMINAL      Knee replacement NOS Left     LAPAROSCOPIC NISSEN FUNDOPLICATION N/A 2/4/2015    Procedure: LAPAROSCOPIC NISSEN FUNDOPLICATION;  Surgeon: Armando Ansari MD;  Location: [facility] OR    TONSILLECTOMY      TRANSPOSITION ULNAR NERVE (ELBOW)         Prior to Admission Medications   Prior to Admission Medications   Prescriptions Last Dose Informant Patient Reported? Taking?   Cholecalciferol (VITAMIN D-3) 125 MCG (5000 UT) TABS   No No   Sig: Take 5,000 Units by mouth daily   acetaminophen (TYLENOL) 500 MG tablet   Yes No   Sig: Take 1,000 mg by mouth every 6 hours as needed for mild pain   butalbital-aspirin-caffeine (FIORINAL) -40 MG capsule   No No   Sig: Take 1 capsule by mouth every 4 hours as needed for headaches or migraine   escitalopram (LEXAPRO) 5 MG tablet   No No   Sig: Take 1 tablet (5 mg) by mouth daily   furosemide (LASIX) 20 MG tablet   No No   Sig: Take 1 tablet (20 mg) by mouth daily   glipiZIDE (GLUCOTROL XL) 2.5 MG 24 hr tablet   No No   Sig: Take 1 tablet (2.5 mg) by mouth 2 times daily   indapamide (LOZOL) 2.5 MG tablet   No No   Sig: Take 1 tablet (2.5 mg) by mouth every morning   loperamide (IMODIUM A-D) 2 MG capsule   No No   Sig: Take 1 capsule (2 mg) by mouth 4 times daily as needed for diarrhea   loperamide (IMODIUM A-D) 2 MG tablet   Yes No   Sig: Take 2 mg by mouth 4 times daily as needed for diarrhea   warfarin ANTICOAGULANT (COUMADIN) 2.5 MG tablet   No No   Sig: Take 2.5 mg (2.5 mg x 1) every Mon; 3.75 mg (2.5 mg x 1.5) all other days or as directed by [facility] clinic      Facility-Administered Medications: None     Allergies   Allergies   Allergen Reactions    Augmented Betamethasone Diprop [Betamethasone] Other (See Comments)     Severe yeast infection    Petroleum Jelly [Petrolatum] Anaphylaxis     Rash and swelling    Shellfish-Derived Products Anaphylaxis     Tongue swelling    Aspirin Swelling     tiongue swelling    Bacitracin       Rash swelling    Bactrim [Sulfamethoxazole-Trimethoprim] Dizziness    Coumadin [Warfarin] Swelling     Leg swelling    Darvon [Propoxyphene] Swelling     Throat closes    Dilaudid [Hydromorphone]      No side effects from fentanyl June 2023    Levaquin [Levofloxacin] Swelling     Tongue swelling    Lidocaine     Neomycin Swelling     rash    Neosporin [Neomycin-Polymyxin-Gramicidin] Swelling     rash    Nitrofurantoin      SOB, GI upset,    Oxycodone      Severe itching    Percodan [Oxycodone-Aspirin]      Severe itching    Polymyxin B     Pramoxine     Tramadol     Vicodin [Hydrocodone-Acetaminophen]      Severe itching      Xarelto [Rivaroxaban]     Adhesive Tape Rash     Band aids     Codeine Rash    Hydrocortisone Rash and Swelling    Other Environmental Allergy Rash     Adhesive tape   Band aids        Social History   I have reviewed this patient's social history and updated it with pertinent information if needed. Savanna Rehman  reports that she quit smoking about 9 years ago. Her smoking use included cigarettes. She has a 25 pack-year smoking history. She has been exposed to tobacco smoke. She has never used smokeless tobacco. She reports current alcohol use. She reports that she does not use drugs.    Family History   I have reviewed this patient's family history and updated it with pertinent information if needed.   Family History   Problem Relation Age of Onset    Alzheimer Disease Mother     Lung Cancer Father     No Known Problems Brother     No Known Problems Brother     No Known Problems Brother     Unknown/Adopted No family hx of        Review of Systems   The 10 point Review of Systems is negative other than noted in the HPI or here.     Physical Exam   Temp: 97.6  F (36.4  C) Temp src: Oral BP: (!) 143/81 Pulse: 56   Resp: 18 SpO2: 100 % O2 Device: None (Room air)    Vital Signs with Ranges  Temp:  [97.6  F (36.4  C)] 97.6  F (36.4  C)  Pulse:  [56] 56  Resp:  [18] 18  BP: (143)/(81)  "143/81  SpO2:  [100 %] 100 %  251 lbs 12.25 oz    Constitutional: Obese, chronically deconditioned.  No acute distress.  HEENT: Normocephalic, MMM, not appreciate JVD secondary to body habitus.  Respiratory: Nl WOB, Clear bilaterally, diminished breath sounds at the bases.  No rhonchi.  Cardiovascular: Irregular, no murmur noted  GI: Obese, BS+, NT, ND, no rebound or guarding  Lymph/Hematologic: No bruising. No cervical LAD  Skin: No rash  Musculoskeletal: Nl Tone, moderate bilateral edema noted  Neurologic: A&Ox3, Answers appropriately. CNII-XII intact. Moves all extremities. No tremor  Psychiatric: Calm    Data   Data reviewed today:  I personally reviewed   Recent Labs   Lab 02/22/24  1500   WBC 5.1   HGB 11.9   MCV 92   *   INR 1.95*      POTASSIUM 3.8   CHLORIDE 98   CO2 29   BUN 18.9   CR 0.96*   ANIONGAP 8   SAUL 9.1   *   ALBUMIN 3.7   PROTTOTAL 7.7   BILITOTAL 1.2   ALKPHOS 132   ALT 26   AST 46*       No results found for this or any previous visit (from the past 24 hour(s)).    Clinically Significant Risk Factors Present on Admission               # Drug Induced Coagulation Defect: home medication list includes an anticoagulant medication  # Thrombocytopenia: Lowest platelets = 117 in last 2 days, will monitor for bleeding        # Obesity: Estimated body mass index is 38.28 kg/m  as calculated from the following:    Height as of 2/8/24: 1.727 m (5' 8\").    Weight as of this encounter: 114.2 kg (251 lb 12.3 oz).                 "

## 2024-02-22 NOTE — TELEPHONE ENCOUNTER
Patient calls to advise that she is at the ED as advised and they do not consider her as Urgent. Patient states it could still be another three hours before she is taken back to be seen.

## 2024-02-22 NOTE — TELEPHONE ENCOUNTER
"Called and spoke to patient to relay primary care provider's message. Patient says she would still like to be seen at [facility], vs ER. Patient says \"plans have changed since finding out about my situation\". Patient says family/ would be able to take her to [facility] tomorrow for evaluation.     Call to [facility] to see if they could see patient tomorrow around 11am-12pm. [facility] provider also recommended patient go to emergency room today, but can put patient on schedule at [facility] tomorrow at 10:30 am.    Call to patient to notify her. Patient says she will \"consider going to emergency room today\", but otherwise will plan to come to [facility] appointment tomorrow at 10:30am.    Appointments in Next Year      Feb 23, 2024 10:30 AM  Diagnostic Visit with Ri Kinga Prov  [facility] ([facility] [place] [facility] ) [phone]     Thank you,  Dawood Ortez, Triage RN [facility]  11:26 AM 2/22/2024   "

## 2024-02-22 NOTE — PHARMACY-ADMISSION MEDICATION HISTORY
2/23/24: updated Allergies Per [facility] from [facility]  below is her list per phone call that included the following:  Dilaudid, Levaquin, Morphine, Darvon, Percocet, Vicodin, Oxycontin, Nitrofurantoin, Petrolium Jelly, Adhesive Tape    added morphine  / Percocet into allergy list since all others are included in our Epic database already.--[facility]    2/23/2024: Added escitalopram 5mg at bedtime to PTA medication list, per patient's report. Confirmed dose with [facility] fill history. Medication is already ordered by provider.   Tamica Casiano [facility]        Anticoag:   PTA anticoag note has: 2.5 mg (2.5 mg x 1) every Mon; 3.75 mg (2.5 mg x 1.5) all other days     Patient addiment she was taking warfarin as 3.75mg = Mon, 2.5mg=ROW. Stated took 1 tablet (2.5 mg) yesterday 2/21.     Crystal Cheung RPH on 2/22/2024 at 9:23 PM    --------------------------------------------------------------------------------------------------    Pharmacist Admission Medication History    Admission medication history is complete. The information provided in this note is only as accurate as the sources available at the time of the update.    Information Source(s): Patient via in-person    Pertinent Information: none    Changes made to PTA medication list:  Added: None  Deleted: lexapro  Changed: lasix to prn, Imodium, warfarin    Allergies reviewed with patient and updates made in EHR: yes    Medication History Completed By: Chinmay Ricci RPH 2/22/2024 5:58 PM    Prior to Admission medications    Medication Sig Last Dose Taking? Auth Provider Long Term End Date   acetaminophen (TYLENOL) 500 MG tablet Take 1,000 mg by mouth every 6 hours as needed for mild pain prn Yes Unknown, Entered By History     Cholecalciferol (VITAMIN D-3) 125 MCG (5000 UT) TABS Take 5,000 Units by mouth daily 2/21/2024 Yes Patti Suárez MD     furosemide (LASIX) 20 MG tablet Take 1 tablet (20 mg) by mouth daily  Patient taking differently: Take 20  mg by mouth daily as needed prn Yes Taylor Payan MD Yes    glipiZIDE (GLUCOTROL XL) 2.5 MG 24 hr tablet Take 1 tablet (2.5 mg) by mouth 2 times daily 2/21/2024 Yes Patti Suárez MD Yes    indapamide (LOZOL) 2.5 MG tablet Take 1 tablet (2.5 mg) by mouth every morning 2/21/2024 Yes Taylor Paayn MD Yes    loperamide (IMODIUM A-D) 2 MG capsule Take 1 capsule (2 mg) by mouth 4 times daily as needed for diarrhea  Patient taking differently: Take 2 mg by mouth 2 times daily as needed for diarrhea prn Yes Taylor Payan MD     loperamide (IMODIUM A-D) 2 MG tablet Take 2 mg by mouth 2 times daily 2/21/2024 Yes Reported, Patient     warfarin ANTICOAGULANT (COUMADIN) 2.5 MG tablet Take by mouth daily 3.75mg = Mon, 2.5mg=ROW 2/21/2024 at 2.5mg Yes Unknown, Entered By History

## 2024-02-22 NOTE — TELEPHONE ENCOUNTER
Patient calling stating her wt today is 251 lbs.  Noting a lot of swelling, especially in left leg(stated leg is huge).  Denied increased warmth, but is noting redness in left leg-area specked looking.  Denied increased warmth, fever, pain or any other concerns.      Has Lasix ordered.  Please advise on next steps.      Pharmacy pended.      Having trouble with phone and if gets voicemail, please call her back right away.  If can get to her voice mail, can leave detailed message.

## 2024-02-22 NOTE — ED TRIAGE NOTES
C/o increased leg swelling and fluids retention. Pt reports that she has gained 14 lbs in 3 days. Pt alert and oriented. Pt states that she has been taking her diuretic twice a day.

## 2024-02-22 NOTE — ED NOTES
[facility]  ED Nurse Handoff Report    ED Chief complaint: Leg Swelling  . ED Diagnosis:   Final diagnoses:   Hypervolemia, unspecified hypervolemia type   Congestive heart failure, unspecified HF chronicity, unspecified heart failure type (H)       Allergies:   Allergies   Allergen Reactions    Augmented Betamethasone Diprop [Betamethasone] Other (See Comments)     Severe yeast infection    Petroleum Jelly [Petrolatum] Anaphylaxis     Rash and swelling    Shellfish-Derived Products Anaphylaxis     Tongue swelling    Aspirin Swelling     tiongue swelling    Bacitracin      Rash swelling    Bactrim [Sulfamethoxazole-Trimethoprim] Dizziness    Coumadin [Warfarin] Swelling     Leg swelling    Darvon [Propoxyphene] Swelling     Throat closes    Dilaudid [Hydromorphone]      No side effects from fentanyl June 2023    Levaquin [Levofloxacin] Swelling     Tongue swelling    Lidocaine     Neomycin Swelling     rash    Neosporin [Neomycin-Polymyxin-Gramicidin] Swelling     rash    Nitrofurantoin      SOB, GI upset,    Oxycodone      Severe itching    Percodan [Oxycodone-Aspirin]      Severe itching    Polymyxin B     Pramoxine     Tramadol     Vicodin [Hydrocodone-Acetaminophen]      Severe itching      Xarelto [Rivaroxaban]     Adhesive Tape Rash     Band aids     Codeine Rash    Hydrocortisone Rash and Swelling    Other Environmental Allergy Rash     Adhesive tape   Band aids        Code Status: Full Code    Activity level - Baseline/Home:  standby.  Activity Level - Current:   in bed and standby.   Lift room needed: No.   Bariatric: No   Needed: No   Isolation: No.   Infection: Not Applicable.     Respiratory status: Room air    Vital Signs (within 30 minutes):   Vitals:    02/22/24 1328 02/22/24 1329 02/22/24 1330 02/22/24 1645   BP:  (!) 143/81  (!) 160/96   Pulse: 56   65   Resp: 18   15   Temp: 97.6  F (36.4  C)      TempSrc: Oral      SpO2:  100%  100%   Weight:   114.2 kg (251 lb  12.3 oz)        Cardiac Rhythm:  ,      Pain level:    Patient confused: No.   Patient Falls Risk: arm band in place and patient and family education.   Elimination Status: Has voided     Patient Report - Initial Complaint: BLE Edema.   Focused Assessment: 3+ BLE edema     Abnormal Results:   Labs Ordered and Resulted from Time of ED Arrival to Time of ED Departure   BASIC METABOLIC PANEL - Abnormal       Result Value    Sodium 135      Potassium 3.8      Chloride 98      Carbon Dioxide (CO2) 29      Anion Gap 8      Urea Nitrogen 18.9      Creatinine 0.96 (*)     GFR Estimate 59 (*)     Calcium 9.1      Glucose 117 (*)    CBC WITH PLATELETS AND DIFFERENTIAL - Abnormal    WBC Count 5.1      RBC Count 3.92      Hemoglobin 11.9      Hematocrit 36.2      MCV 92      MCH 30.4      MCHC 32.9      RDW 14.2      Platelet Count 117 (*)     % Neutrophils 50      % Lymphocytes 31      % Monocytes 13      % Eosinophils 5      % Basophils 1      % Immature Granulocytes 0      NRBCs per 100 WBC 0      Absolute Neutrophils 2.5      Absolute Lymphocytes 1.6      Absolute Monocytes 0.7      Absolute Eosinophils 0.3      Absolute Basophils 0.1      Absolute Immature Granulocytes 0.0      Absolute NRBCs 0.0     TROPONIN T, HIGH SENSITIVITY - Abnormal    Troponin T, High Sensitivity 26 (*)    HEPATIC FUNCTION PANEL - Abnormal    Protein Total 7.7      Albumin 3.7      Bilirubin Total 1.2      Alkaline Phosphatase 132      AST 46 (*)     ALT 26      Bilirubin Direct 0.34 (*)    INR - Abnormal    INR 1.95 (*)    NT PROBNP INPATIENT - Normal    N terminal Pro BNP Inpatient 1,177     ROUTINE UA WITH MICROSCOPIC REFLEX TO CULTURE        XR Chest 2 Views   Final Result   IMPRESSION: Stable enlargement of the cardiac silhouette. Subtle areas of fibrosis without mike airspace consolidation or edema. No significant pleural effusion or pneumothorax. Bones are unchanged.          Treatments provided: Lasix  Family Comments: see chart  OBS  brochure/video discussed/provided to patient:  Yes  ED Medications:   Medications   furosemide (LASIX) injection 40 mg (40 mg Intravenous $Given 2/22/24 4389)       Drips infusing:  No  For the majority of the shift this patient was Green.   Interventions performed were n/a.    Sepsis treatment initiated: No    Cares/treatment/interventions/medications to be completed following ED care: Lasix, I/O. Daily weights    ED Nurse Name: Aislinn Austin RN  4:54 PM     RECEIVING UNIT ED HANDOFF REVIEW    Above ED Nurse Handoff Report was reviewed: Yes  Reviewed by: Trinh Palmer, RN on February 22, 2024 at 8:32 PM   I Dayna called the ED to inform them the note was read: Yes

## 2024-02-22 NOTE — TELEPHONE ENCOUNTER
"Nurse Triage SBAR    Is this a 2nd Level Triage? YES, LICENSED PRACTITIONER REVIEW IS REQUIRED    Situation: Patient reports increase in weight and swelling    Background: Patient taking lasix and on low sodium diet. Hx of CHF and  A fib. Patient reports she currently has DVT in her buttocks for the past year.     Assessment: Patient reports increase in swelling yesterday, especially in her left leg. Patient reports she had a weight gain of 4 pounds in 24 hour period. Patient reports redness in the left leg and looks \"specky brown color\". Patient denies warm to touch.     Patient reports she walks with a walker and she is able to get around like she normally does. Patient reports her face looks \"bedolla\", but patient states she's gained weight due to all carb diet.     Patient denies any fever, shortness of breath, chest pain. Patient denies pain in her legs, but reports her legs feel \"tight\".     Protocol Recommended Disposition:   Go To Office Now     Recommendation: Recommend ADS to rule out blood clot. Provider recommended urgent care or lower leg extremity ultrasound (urgent care unable to do this as they don't have ultrasound).     Patient states that getting to clinic is \"impossible\" and she would need to notify [employer] 48 hours in advanced in order to be seen. Patient wouldn't be able to walk long distance with just her mobility walker. Patient reports urgent care \"wouldn't touch me and direct me to go to nearest emergency room\".     Could patient to be seen at [facility] on Monday (to allow time for patient to arrange for transportation with [employer]). Patient states she will go to emergency room if she continues to gain weight. Patient would like to be seen around 11am-12pm on Monday at [facility] if possible.     Routed to provider    Does the patient meet one of the following criteria for ADS visit consideration? 16+ years old, with an [facility] PCP     TIP  Providers, please consider if this condition is " "appropriate for management at one of our Acute and Diagnostic Services sites.     If patient is a good candidate, please use dotphrase <dot>triageresponse and select Refer to ADS to document.    Reason for Disposition   Thigh, calf, or ankle swelling in both legs, but one side is definitely more swollen (Exception: Longstanding difference between legs.)    Additional Information   Negative: Chest pain   Negative: Followed an insect bite and has localized swelling (e.g., small area of puffy or swollen skin)   Negative: Followed a knee injury   Negative: Ankle or foot injury   Negative: Pregnant with leg swelling or edema   Negative: Sounds like a life-threatening emergency to the triager   Negative: Difficulty breathing at rest   Negative: Entire foot is cool or blue in comparison to other side   Negative: SEVERE swelling (e.g., swelling extends above knee, entire leg is swollen, weeping fluid)   Negative: Cast on leg or ankle and has increasing pain   Negative: Can't walk or can barely stand (new-onset)   Negative: Fever and red area (or area very tender to touch)   Negative: Patient sounds very sick or weak to the triager   Negative: Thigh, calf, or ankle swelling in only one leg   Negative: Thigh or calf pain and only 1 side and present > 1 hour   Negative: Swelling of face, arm or hands  (Exception: Slight puffiness of fingers during hot weather.)   Negative: Pregnant 20 or more weeks and sudden weight gain (i.e., > 2 lbs, 1 kg in one week)    Answer Assessment - Initial Assessment Questions  1. ONSET: \"When did the swelling start?\" (e.g., minutes, hours, days)      Yesterday, increased    2. LOCATION: \"What part of the leg is swollen?\"  \"Are both legs swollen or just one leg?\"      Both legs, but left is worse.     3. SEVERITY: \"How bad is the swelling?\" (e.g., localized; mild, moderate, severe)    - Localized: Small area of swelling localized to one leg.    - MILD pedal edema: Swelling limited to foot and " "ankle, pitting edema < 1/4 inch (6 mm) deep, rest and elevation eliminate most or all swelling.    - MODERATE edema: Swelling of lower leg to knee, pitting edema > 1/4 inch (6 mm) deep, rest and elevation only partially reduce swelling.    - SEVERE edema: Swelling extends above knee, facial or hand swelling present.       Moderate, no hand swelling.    4. REDNESS: \"Does the swelling look red or infected?\"      Red, but not warm to touch.     5. PAIN: \"Is the swelling painful to touch?\" If Yes, ask: \"How painful is it?\"   (Scale 1-10; mild, moderate or severe)      No, but feels \"tight\" .    6. FEVER: \"Do you have a fever?\" If Yes, ask: \"What is it, how was it measured, and when did it start?\"       No.     7. CAUSE: \"What do you think is causing the leg swelling?\"      Hx of weight re tension.     8. MEDICAL HISTORY: \"Do you have a history of blood clots (e.g., DVT), cancer, heart failure, kidney disease, or liver failure?\"      Currently blood clot in her buttocks per patient. Patient has hx of heart failure and a fib    9. RECURRENT SYMPTOM: \"Have you had leg swelling before?\" If Yes, ask: \"When was the last time?\" \"What happened that time?\"      Patient says she always has leg swelling.     10. OTHER SYMPTOMS: \"Do you have any other symptoms?\" (e.g., chest pain, difficulty breathing)        No.     11. PREGNANCY: \"Is there any chance you are pregnant?\" \"When was your last menstrual period?\"        N/A    Protocols used: Leg Swelling and Edema-A-OH    "

## 2024-02-23 ENCOUNTER — TELEPHONE (OUTPATIENT)
Dept: INTERNAL MEDICINE | Facility: CLINIC | Age: 84
End: 2024-02-23

## 2024-02-23 ENCOUNTER — APPOINTMENT (OUTPATIENT)
Dept: CARDIOLOGY | Facility: CLINIC | Age: 84
DRG: 291 | End: 2024-02-23
Attending: HOSPITALIST
Payer: COMMERCIAL

## 2024-02-23 DIAGNOSIS — Z53.9 DIAGNOSIS NOT YET DEFINED: Primary | ICD-10-CM

## 2024-02-23 LAB
ANION GAP SERPL CALCULATED.3IONS-SCNC: 8 MMOL/L (ref 7–15)
ATRIAL RATE - MUSE: NORMAL BPM
BACTERIA UR CULT: NORMAL
BUN SERPL-MCNC: 19.8 MG/DL (ref 8–23)
CALCIUM SERPL-MCNC: 8.9 MG/DL (ref 8.8–10.2)
CHLORIDE SERPL-SCNC: 97 MMOL/L (ref 98–107)
CREAT SERPL-MCNC: 1 MG/DL (ref 0.51–0.95)
DEPRECATED HCO3 PLAS-SCNC: 31 MMOL/L (ref 22–29)
DIASTOLIC BLOOD PRESSURE - MUSE: NORMAL MMHG
EGFRCR SERPLBLD CKD-EPI 2021: 56 ML/MIN/1.73M2
ERYTHROCYTE [DISTWIDTH] IN BLOOD BY AUTOMATED COUNT: 14.2 % (ref 10–15)
GLUCOSE BLDC GLUCOMTR-MCNC: 135 MG/DL (ref 70–99)
GLUCOSE BLDC GLUCOMTR-MCNC: 138 MG/DL (ref 70–99)
GLUCOSE BLDC GLUCOMTR-MCNC: 156 MG/DL (ref 70–99)
GLUCOSE BLDC GLUCOMTR-MCNC: 161 MG/DL (ref 70–99)
GLUCOSE BLDC GLUCOMTR-MCNC: 162 MG/DL (ref 70–99)
GLUCOSE BLDC GLUCOMTR-MCNC: 256 MG/DL (ref 70–99)
GLUCOSE BLDC GLUCOMTR-MCNC: 91 MG/DL (ref 70–99)
GLUCOSE SERPL-MCNC: 98 MG/DL (ref 70–99)
HCT VFR BLD AUTO: 34.3 % (ref 35–47)
HGB BLD-MCNC: 11.4 G/DL (ref 11.7–15.7)
INR PPP: 1.91 (ref 0.85–1.15)
INTERPRETATION ECG - MUSE: NORMAL
LVEF ECHO: NORMAL
MAGNESIUM SERPL-MCNC: 1.5 MG/DL (ref 1.7–2.3)
MAGNESIUM SERPL-MCNC: 2.4 MG/DL (ref 1.7–2.3)
MCH RBC QN AUTO: 30.4 PG (ref 26.5–33)
MCHC RBC AUTO-ENTMCNC: 33.2 G/DL (ref 31.5–36.5)
MCV RBC AUTO: 92 FL (ref 78–100)
P AXIS - MUSE: NORMAL DEGREES
PLATELET # BLD AUTO: 118 10E3/UL (ref 150–450)
POTASSIUM SERPL-SCNC: 3.5 MMOL/L (ref 3.4–5.3)
PR INTERVAL - MUSE: NORMAL MS
QRS DURATION - MUSE: 108 MS
QT - MUSE: 404 MS
QTC - MUSE: 389 MS
R AXIS - MUSE: -18 DEGREES
RBC # BLD AUTO: 3.75 10E6/UL (ref 3.8–5.2)
SODIUM SERPL-SCNC: 136 MMOL/L (ref 135–145)
SYSTOLIC BLOOD PRESSURE - MUSE: NORMAL MMHG
T AXIS - MUSE: 48 DEGREES
VENTRICULAR RATE- MUSE: 56 BPM
WBC # BLD AUTO: 4.8 10E3/UL (ref 4–11)

## 2024-02-23 PROCEDURE — 36415 COLL VENOUS BLD VENIPUNCTURE: CPT | Performed by: PHYSICIAN ASSISTANT

## 2024-02-23 PROCEDURE — 99233 SBSQ HOSP IP/OBS HIGH 50: CPT | Performed by: PHYSICIAN ASSISTANT

## 2024-02-23 PROCEDURE — G0179 MD RECERTIFICATION HHA PT: HCPCS | Performed by: INTERNAL MEDICINE

## 2024-02-23 PROCEDURE — 250N000011 HC RX IP 250 OP 636: Performed by: HOSPITALIST

## 2024-02-23 PROCEDURE — 83735 ASSAY OF MAGNESIUM: CPT | Performed by: PHYSICIAN ASSISTANT

## 2024-02-23 PROCEDURE — G0378 HOSPITAL OBSERVATION PER HR: HCPCS

## 2024-02-23 PROCEDURE — 250N000013 HC RX MED GY IP 250 OP 250 PS 637: Performed by: PHYSICIAN ASSISTANT

## 2024-02-23 PROCEDURE — 80048 BASIC METABOLIC PNL TOTAL CA: CPT | Performed by: HOSPITALIST

## 2024-02-23 PROCEDURE — 85610 PROTHROMBIN TIME: CPT | Performed by: HOSPITALIST

## 2024-02-23 PROCEDURE — 36415 COLL VENOUS BLD VENIPUNCTURE: CPT | Performed by: HOSPITALIST

## 2024-02-23 PROCEDURE — 250N000011 HC RX IP 250 OP 636: Performed by: PHYSICIAN ASSISTANT

## 2024-02-23 PROCEDURE — 93306 TTE W/DOPPLER COMPLETE: CPT | Mod: 26 | Performed by: INTERNAL MEDICINE

## 2024-02-23 PROCEDURE — 82962 GLUCOSE BLOOD TEST: CPT

## 2024-02-23 PROCEDURE — 250N000013 HC RX MED GY IP 250 OP 250 PS 637: Performed by: HOSPITALIST

## 2024-02-23 PROCEDURE — 96376 TX/PRO/DX INJ SAME DRUG ADON: CPT

## 2024-02-23 PROCEDURE — 85014 HEMATOCRIT: CPT | Performed by: HOSPITALIST

## 2024-02-23 PROCEDURE — 999N000208 ECHOCARDIOGRAM COMPLETE

## 2024-02-23 PROCEDURE — 120N000001 HC R&B MED SURG/OB

## 2024-02-23 PROCEDURE — 255N000002 HC RX 255 OP 636: Performed by: HOSPITALIST

## 2024-02-23 RX ORDER — INDAPAMIDE 1.25 MG/1
2.5 TABLET ORAL EVERY MORNING
Status: DISCONTINUED | OUTPATIENT
Start: 2024-02-24 | End: 2024-02-24 | Stop reason: HOSPADM

## 2024-02-23 RX ORDER — POTASSIUM CHLORIDE 1500 MG/1
20 TABLET, EXTENDED RELEASE ORAL ONCE
Status: COMPLETED | OUTPATIENT
Start: 2024-02-23 | End: 2024-02-23

## 2024-02-23 RX ORDER — LOPERAMIDE HCL 2 MG
2 CAPSULE ORAL 4 TIMES DAILY PRN
Status: DISCONTINUED | OUTPATIENT
Start: 2024-02-23 | End: 2024-02-24 | Stop reason: HOSPADM

## 2024-02-23 RX ORDER — WARFARIN SODIUM 1 MG/1
4 TABLET ORAL
Status: COMPLETED | OUTPATIENT
Start: 2024-02-23 | End: 2024-02-23

## 2024-02-23 RX ORDER — MAGNESIUM SULFATE HEPTAHYDRATE 40 MG/ML
4 INJECTION, SOLUTION INTRAVENOUS ONCE
Status: COMPLETED | OUTPATIENT
Start: 2024-02-23 | End: 2024-02-23

## 2024-02-23 RX ORDER — ESCITALOPRAM OXALATE 5 MG/1
5 TABLET ORAL AT BEDTIME
COMMUNITY
End: 2024-03-26

## 2024-02-23 RX ORDER — ACETAMINOPHEN 500 MG
1000 TABLET ORAL EVERY 6 HOURS PRN
Status: DISCONTINUED | OUTPATIENT
Start: 2024-02-23 | End: 2024-02-24 | Stop reason: HOSPADM

## 2024-02-23 RX ORDER — LOPERAMIDE HCL 2 MG
2 CAPSULE ORAL 2 TIMES DAILY
Status: DISCONTINUED | OUTPATIENT
Start: 2024-02-23 | End: 2024-02-24 | Stop reason: HOSPADM

## 2024-02-23 RX ORDER — FUROSEMIDE 10 MG/ML
40 INJECTION INTRAMUSCULAR; INTRAVENOUS EVERY 8 HOURS
Status: COMPLETED | OUTPATIENT
Start: 2024-02-23 | End: 2024-02-24

## 2024-02-23 RX ADMIN — WARFARIN SODIUM 4 MG: 1 TABLET ORAL at 17:11

## 2024-02-23 RX ADMIN — GLIPIZIDE 2.5 MG: 2.5 TABLET, FILM COATED, EXTENDED RELEASE ORAL at 20:23

## 2024-02-23 RX ADMIN — ESCITALOPRAM 5 MG: 5 TABLET, FILM COATED ORAL at 22:05

## 2024-02-23 RX ADMIN — MAGNESIUM SULFATE HEPTAHYDRATE 4 G: 4 INJECTION, SOLUTION INTRAVENOUS at 16:37

## 2024-02-23 RX ADMIN — HUMAN ALBUMIN MICROSPHERES AND PERFLUTREN 6 ML: 10; .22 INJECTION, SOLUTION INTRAVENOUS at 11:04

## 2024-02-23 RX ADMIN — FUROSEMIDE 40 MG: 10 INJECTION, SOLUTION INTRAMUSCULAR; INTRAVENOUS at 08:33

## 2024-02-23 RX ADMIN — POTASSIUM CHLORIDE 20 MEQ: 1500 TABLET, EXTENDED RELEASE ORAL at 14:58

## 2024-02-23 RX ADMIN — GLIPIZIDE 2.5 MG: 2.5 TABLET, FILM COATED, EXTENDED RELEASE ORAL at 08:32

## 2024-02-23 RX ADMIN — FUROSEMIDE 40 MG: 10 INJECTION, SOLUTION INTRAMUSCULAR; INTRAVENOUS at 17:02

## 2024-02-23 ASSESSMENT — ACTIVITIES OF DAILY LIVING (ADL)
ADLS_ACUITY_SCORE: 41
CONCENTRATING,_REMEMBERING_OR_MAKING_DECISIONS_DIFFICULTY: NO
ADLS_ACUITY_SCORE: 41
DOING_ERRANDS_INDEPENDENTLY_DIFFICULTY: NO
ADLS_ACUITY_SCORE: 27
ADLS_ACUITY_SCORE: 32
ADLS_ACUITY_SCORE: 41
ADLS_ACUITY_SCORE: 41
WALKING_OR_CLIMBING_STAIRS: STAIR CLIMBING DIFFICULTY, ASSISTANCE 1 PERSON
ADLS_ACUITY_SCORE: 41
ADLS_ACUITY_SCORE: 41
WALKING_OR_CLIMBING_STAIRS_DIFFICULTY: YES
ADLS_ACUITY_SCORE: 32
ADLS_ACUITY_SCORE: 41
CHANGE_IN_FUNCTIONAL_STATUS_SINCE_ONSET_OF_CURRENT_ILLNESS/INJURY: NO
ADLS_ACUITY_SCORE: 41
ADLS_ACUITY_SCORE: 41
ADLS_ACUITY_SCORE: 27
ADLS_ACUITY_SCORE: 41
ADLS_ACUITY_SCORE: 41
ADLS_ACUITY_SCORE: 32
ADLS_ACUITY_SCORE: 41
ADLS_ACUITY_SCORE: 41
DIFFICULTY_EATING/SWALLOWING: NO
ADLS_ACUITY_SCORE: 41
ADLS_ACUITY_SCORE: 27
EQUIPMENT_CURRENTLY_USED_AT_HOME: WALKER, ROLLING
FALL_HISTORY_WITHIN_LAST_SIX_MONTHS: NO
TOILETING_ISSUES: NO
ADLS_ACUITY_SCORE: 41
DRESSING/BATHING_DIFFICULTY: NO

## 2024-02-23 NOTE — PROVIDER NOTIFICATION
Updated x-cover of 2.0 second pause followed by 2.2 second pause per tele tech.     Addendum: EKG and labs ordered by provider

## 2024-02-23 NOTE — PROGRESS NOTES
[facility]  Hospitalist Progress Note  Jennifer Oreilly PA-C 02/23/2024    Reason for Stay (Diagnosis): CHF exacerbation          Assessment and Plan:      Savanna Rehman is a 83 year old female with a past medical history of chronic A-fib on Coumadin, hypertension, heart failure with preserved ejection fraction, type 2 diabetes mellitus, CRISTIANA with noncompliance who presents with lower extremity edema and weight gain.    In ED, patient afebrile and nontachycardic.  Normotensive.  Saturating well on room air.  CBC notable for platelet count of 117.  BMP with renal function at baseline.  BNP not significantly elevated at 1200.  High-sensitivity troponin mildly elevated at 26.  Chest x-ray done without any acute infiltrate.  Patient was given 40 mg of IV Lasix.    #Lower extremity edema, subjective SOB suspect secondary to volume overload. Acute on chronic HFpEF: Patient notes that she has gained 14 pounds in the last 3 to 4 days.  She has noted increased lower extremity swelling.  She has had some shortness of breath with exertion.  No chest pain.  No increased nausea or vomiting but does have some nausea at baseline.  She tells me she has been told she should be using CPAP but does not use it.    --Repeat ECHO this admission, shows preserved EF at 55-60% unchanged from last echocardiogram from February 2020   --Does have increased  right ventricular systolic fxn consistent with pulm HTN, not surprising given CPAP non compliance   -- Weight 245 today, baseline 236-238.   -- Increase Lasix to 40 mg TID for now  -- Monitor strict I/O, daily STANDING weights  -- Watch Cr     #2-3 sec Pauses   - Noted by RN regarding multiple 2-3 sec pauses  - Asymptomatic   - Cont to monitor for now   - Check K/Mg. Keep K > 4 and Mg> 2    #Mild elevation of Hs troponin: No chest pain. ECG with afib and slow ventricular rate.  Repeat troponin to ensure no significant uptrend.   - ECHO shows no new concern for ischemia  "    #Thrombocytopenia: Has had some thrombocytopenia in past based on labs.  No bleeding noted. Hgb OK. May be related to hepatic congestion from right-sided heart failure.  - check peripheral smear    #Afib with slow ventricular response. Coagulopathy secondary to warfarin: ECG as above with afib and rates in 50's.  Not on rate-controlling meds. Continue warfarin pharm to dose.  Monitor heart rates on telemetry overnight.  - Stable, no changes      #Diabetes mellitus: Continue home glipizide.  Sliding scale insulin.  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    - Still needs f/u with GI?  - Suspect will need additional diuresis at discharge ie lasix plus aldactone      #CRISTIANA, noncompliant with CPAP: Encouraged use of CPAP.  Discussed adverse effects on cardiac health with not using CPAP.  - Has e/o increased Pulm pressure on ECHO      DVT Prophylaxis: Warfarin  Code Status: Full Code.  Discussed with patient.  She is going to discuss further with her .  Dispo: transition to IP status          Interval History (Subjective):      Feels a bit better but still c/o not being able to take a deep breath, LE swelling improved but not at baseline                   Physical Exam:      Last Vital Signs:  /59 (BP Location: Right arm)   Pulse 52   Temp 97.6  F (36.4  C) (Oral)   Resp 16   Ht 1.727 m (5' 8\")   Wt 111.4 kg (245 lb 11.2 oz)   LMP  (LMP Unknown)   SpO2 96%   BMI 37.36 kg/m        Intake/Output Summary (Last 24 hours) at 2/23/2024 1736  Last data filed at 2/23/2024 1200  Gross per 24 hour   Intake 1650 ml   Output 3000 ml   Net -1350 ml       Constitutional: Awake, alert, cooperative, no apparent distress     Respiratory: Slight crackles at bases L> Rtor wheezing   Cardiovascular: Regular rate and rhythm, normal S1 and S2, and no murmur noted   Abdomen: Normal bowel sounds, soft, non-distended, non-tender   Skin: No rashes, no cyanosis, dry to touch, " trace to 1+pitting edema of the BL E    Neuro: Alert and oriented x3, no weakness, numbness, memory loss   Extremities: No edema, normal range of motion   Other(s):        All other systems: Negative          Medications:      All current medications were reviewed with changes reflected in problem list.         Data:      All new lab and imaging data was reviewed.   Labs:       Lab Results   Component Value Date     02/23/2024     02/22/2024     02/22/2024     02/22/2021     12/04/2020     09/14/2020    Lab Results   Component Value Date    CHLORIDE 97 02/23/2024    CHLORIDE 98 02/22/2024    CHLORIDE 98 02/22/2024    CHLORIDE 105 11/07/2022    CHLORIDE 103 06/02/2022    CHLORIDE 103 03/13/2022    CHLORIDE 103 02/22/2021    CHLORIDE 101 12/04/2020    CHLORIDE 106 09/14/2020    Lab Results   Component Value Date    BUN 19.8 02/23/2024    BUN 20.6 02/22/2024    BUN 18.9 02/22/2024    BUN 17 11/07/2022    BUN 16 06/02/2022    BUN 14 03/13/2022    BUN 14 02/22/2021    BUN 15 12/04/2020    BUN 16 09/14/2020      Lab Results   Component Value Date    POTASSIUM 3.5 02/23/2024    POTASSIUM 4.1 02/22/2024    POTASSIUM 3.8 02/22/2024    POTASSIUM 4.6 11/07/2022    POTASSIUM 4.7 06/02/2022    POTASSIUM 4.5 03/13/2022    POTASSIUM 4.2 02/22/2021    POTASSIUM 4.0 12/04/2020    POTASSIUM 4.1 09/14/2020    Lab Results   Component Value Date    CO2 31 02/23/2024    CO2 33 02/22/2024    CO2 29 02/22/2024    CO2 29 11/07/2022    CO2 31 06/02/2022    CO2 31 03/13/2022    CO2 29 02/22/2021    CO2 31 12/04/2020    CO2 24 09/14/2020    Lab Results   Component Value Date    CR 1.00 02/23/2024    CR 1.04 02/22/2024    CR 0.96 02/22/2024    CR 0.97 02/22/2021    CR 1.00 12/04/2020    CR 0.96 09/14/2020        Recent Labs   Lab 02/23/24  0526 02/22/24  1500   WBC 4.8 5.1   HGB 11.4* 11.9   HCT 34.3* 36.2   MCV 92 92   * 117*      Imaging:   Results for orders placed or performed during the hospital  encounter of 24   XR Chest 2 Views    Narrative    EXAM: XR CHEST 2 VIEWS  LOCATION: [facility]  DATE: 2024    INDICATION: Fluid overload  COMPARISON: Chest radiograph 2021, CT 2023      Impression    IMPRESSION: Stable enlargement of the cardiac silhouette. Subtle areas of fibrosis without mike airspace consolidation or edema. No significant pleural effusion or pneumothorax. Bones are unchanged.   Echocardiogram Complete     Value    LVEF  55-60%    Narrative    [id number]  [id number]  [id number]^DANIELLE^SU     [facility]  Echocardiography Laboratory  [address] [place] [address]     Name: KRIS SANDERSON  MRN: [id number]  : 1940  Study Date: 2024 10:02 AM  Age: 83 yrs  Gender: Female  Patient Location: [facility]  Reason For Study: Edema  Ordering Physician: SU SANTOS  Performed By: DAISY Scott     BSA: 2.3 m2  Height: 68 in  Weight: 251 lb  HR: 53  BP: 160/96 mmHg  ______________________________________________________________________________  Procedure  Complete Portable Echo Adult. Optison (NDC #[id number]) given intravenously.  ______________________________________________________________________________  Interpretation Summary     1. The left ventricle is normal in size. There is normal left ventricular wall  thickness. Left ventricular systolic function is normal. The visual ejection  fraction is 55-60%. Diastolic function not assessed due to atrial  fibrillation. No regional wall motion abnormalities noted. There is no  thrombus seen in the left ventricle.  2. The right ventricle is mildly dilated. The right ventricular systolic  function is normal.  3. Mild to moderate biatrial enlargement.  4. Moderate tricuspid regurgitation. Right ventricular systolic pressure is  elevated, consistent with mild pulmonary hypertension.  5. No pericardial effusion.  6. In comparison to the previous report dated 2021,  the findings are  similar.  ______________________________________________________________________________  Left Ventricle  The left ventricle is normal in size. There is normal left ventricular wall  thickness. Left ventricular systolic function is normal. The visual ejection  fraction is 55-60%. Diastolic function not assessed due to atrial  fibrillation. No regional wall motion abnormalities noted. There is no  thrombus seen in the left ventricle.     Right Ventricle  The right ventricle is mildly dilated. The right ventricular systolic function  is normal.     Atria  There is mild-moderate biatrial enlargement. There is no color Doppler  evidence of an atrial shunt.     Mitral Valve  There is mild mitral annular calcification. There is trace to mild mitral  regurgitation.     Tricuspid Valve  There is moderate (2+) tricuspid regurgitation. The right ventricular systolic  pressure is approximated at 28.4 mmHg plus the right atrial pressure. IVC  diameter >2.1 cm collapsing <50% with sniff suggests a high RA pressure  estimated at 15 mmHg or greater. Right ventricular systolic pressure is  elevated, consistent with mild pulmonary hypertension.     Aortic Valve  There is mild trileaflet aortic sclerosis. There is mild (1+) aortic  regurgitation. No aortic stenosis is present.     Pulmonic Valve  There is trace pulmonic valvular regurgitation. There is no pulmonic valvular  stenosis.     Vessels  The aortic root is normal size. Normal size ascending aorta. Dilation of the  inferior vena cava is present with abnormal respiratory variation in diameter.     Pericardium  There is no pericardial effusion.     Rhythm  The rhythm was atrial fibrillation.  ______________________________________________________________________________  MMode/2D Measurements & Calculations     IVSd: 0.88 cm  LVIDd: 4.4 cm  LVIDs: 2.7 cm  LVPWd: 0.93 cm  FS: 38.7 %  LV mass(C)d: 129.5 grams  LV mass(C)dI: 57.5 grams/m2  Ao root diam: 3.1  cm  asc Aorta Diam: 3.5 cm  LVOT diam: 2.0 cm  LVOT area: 3.3 cm2  Ao root diam index Ht(cm/m): 1.8  Ao root diam index BSA (cm/m2): 1.4  Asc Ao diam index BSA (cm/m2): 1.5  Asc Ao diam index Ht(cm/m): 2.0  LA Volume (BP): 79.7 ml     LA Volume Index (BP): 35.4 ml/m2  RV Base: 4.5 cm  RWT: 0.42  TAPSE: 1.9 cm     Doppler Measurements & Calculations  MV max P.9 mmHg  MV mean P.4 mmHg  MV V2 VTI: 28.4 cm  PA V2 max: 97.5 cm/sec  PA max PG: 3.8 mmHg  PA acc time: 0.11 sec  TR max everett: 266.6 cm/sec  TR max P.4 mmHg  RV S [place]: 10.8 cm/sec     ______________________________________________________________________________  Report approved by: June Lozada 2024 11:29 AM           *Note: Due to a large number of results and/or encounters for the requested time period, some results have not been displayed. A complete set of results can be found in Results Review.

## 2024-02-23 NOTE — PROGRESS NOTES
PRIMARY DIAGNOSIS: CONGESTIVE HEART FAILURE  OUTPATIENT/OBSERVATION GOALS TO BE MET BEFORE DISCHARGE:  Dyspnea improved and O2 sats >88% at RA or at prior home O2 therapy level: Yes        SpO2: 96 %, O2 Device: None (Room air)  Vitals:    02/22/24 1330 02/22/24 2131 02/23/24 0542   Weight: 114.2 kg (251 lb 12.3 oz) 111.9 kg (246 lb 12.8 oz) 111.4 kg (245 lb 11.2 oz)        ECHO and other diagnostic testing complete (if applicable): Yes - see results    Return to near baseline physical activity: Yes    Discharge Planner Nurse   Safe discharge environment identified: Yes  Barriers to discharge: No       Entered by: Guera Gama RN 02/23/2024 2:33 PM  Pt is A/O x4, with SBA and 4ww. Denies any SOB. Lungs diminished. VSS, on RA. Tele: Afib CVR. Notified by tele tech of 3.0 and 3.2 second pause. PA updated and instructed to notify if >4.0 seconds. Pt is receiving IV Lasix Q8 hours. +1/2 edema. Purewick in place with excellent UOP. Cardiac diet, tolerating.   Please review provider order for any additional goals.   Nurse to notify provider when observation goals have been met and patient is ready for discharge.

## 2024-02-23 NOTE — PROGRESS NOTES
PRIMARY DIAGNOSIS: CONGESTIVE HEART FAILURE  OUTPATIENT/OBSERVATION GOALS TO BE MET BEFORE DISCHARGE:  Dyspnea improved and O2 sats >88% at RA or at prior home O2 therapy level: Yes        SpO2: 93 %, O2 Device: None (Room air)  Vitals:    02/22/24 1330 02/22/24 2131 02/23/24 0542   Weight: 114.2 kg (251 lb 12.3 oz) 111.9 kg (246 lb 12.8 oz) 111.4 kg (245 lb 11.2 oz)        ECHO and other diagnostic testing complete (if applicable): Yes    Return to near baseline physical activity: Yes    Discharge Planner Nurse   Safe discharge environment identified: Yes  Barriers to discharge: No       Entered by: Guera Gama RN 02/23/2024 9:52 AM  Pt is A/O x4, up with SBA and 4ww. Denies any SOB. Lungs diminished. VSS, on RA. Tele: Afib CVR. ECHO ordered, to be completed still. Pt is receiving IV Lasix BID. +1/2 edema to BLE. Cardiac diet, tolerating. Pt requesting Imodium.   Please review provider order for any additional goals.   Nurse to notify provider when observation goals have been met and patient is ready for discharge.

## 2024-02-23 NOTE — PHARMACY-ANTICOAGULATION SERVICE
Clinical Pharmacy - Warfarin Dosing Consult     Pharmacy has been consulted to manage this patient s warfarin therapy.  Indication: Atrial Fibrillation  Therapy Goal: INR 2-3  Warfarin Prior to Admission: Yes  Warfarin PTA Regimen: Take by mouth daily 3.75mg = Mon, 2.5mg=ROW  Recent documented change in oral intake/nutrition: Yes    INR   Date Value Ref Range Status   02/22/2024 1.95 (H) 0.85 - 1.15 Final     INR (External)   Date Value Ref Range Status   02/06/2024 2.4 (A) 0.9 - 1.1 Final       Recommend warfarin 3 mg today.  Pharmacy will monitor Savanna Rehman daily and order warfarin doses to achieve specified goal.      Please contact pharmacy as soon as possible if the warfarin needs to be held for a procedure or if the warfarin goals change.

## 2024-02-23 NOTE — PROGRESS NOTES
Hosp Short note,   Full note to follow   Admitted for CHF exacerbation, got 1400cc overnight   Still about 8 lbs up.   Will increase Lasix to 40mg TID for now from BID  Transition to IP status given need for continued diuresis     Likely will need adjustment in her outpt diuresis regiment given dx of FERREIRA ie adding Aldactone

## 2024-02-24 ENCOUNTER — TELEPHONE (OUTPATIENT)
Dept: NURSING | Facility: CLINIC | Age: 84
End: 2024-02-24
Payer: COMMERCIAL

## 2024-02-24 VITALS
SYSTOLIC BLOOD PRESSURE: 125 MMHG | BODY MASS INDEX: 36.07 KG/M2 | DIASTOLIC BLOOD PRESSURE: 53 MMHG | TEMPERATURE: 98 F | WEIGHT: 238 LBS | HEIGHT: 68 IN | HEART RATE: 64 BPM | OXYGEN SATURATION: 94 % | RESPIRATION RATE: 18 BRPM

## 2024-02-24 LAB
ANION GAP SERPL CALCULATED.3IONS-SCNC: 11 MMOL/L (ref 7–15)
BASOPHILS # BLD AUTO: 0 10E3/UL (ref 0–0.2)
BASOPHILS NFR BLD AUTO: 1 %
BUN SERPL-MCNC: 28.1 MG/DL (ref 8–23)
CALCIUM SERPL-MCNC: 9.6 MG/DL (ref 8.8–10.2)
CHLORIDE SERPL-SCNC: 92 MMOL/L (ref 98–107)
CREAT SERPL-MCNC: 1.15 MG/DL (ref 0.51–0.95)
DEPRECATED HCO3 PLAS-SCNC: 33 MMOL/L (ref 22–29)
EGFRCR SERPLBLD CKD-EPI 2021: 48 ML/MIN/1.73M2
EOSINOPHIL # BLD AUTO: 0.3 10E3/UL (ref 0–0.7)
EOSINOPHIL NFR BLD AUTO: 6 %
ERYTHROCYTE [DISTWIDTH] IN BLOOD BY AUTOMATED COUNT: 14 % (ref 10–15)
GLUCOSE BLDC GLUCOMTR-MCNC: 130 MG/DL (ref 70–99)
GLUCOSE BLDC GLUCOMTR-MCNC: 130 MG/DL (ref 70–99)
GLUCOSE BLDC GLUCOMTR-MCNC: 96 MG/DL (ref 70–99)
GLUCOSE SERPL-MCNC: 124 MG/DL (ref 70–99)
HCT VFR BLD AUTO: 35.9 % (ref 35–47)
HGB BLD-MCNC: 12.2 G/DL (ref 11.7–15.7)
IMM GRANULOCYTES # BLD: 0 10E3/UL
IMM GRANULOCYTES NFR BLD: 0 %
INR PPP: 1.6 (ref 0.85–1.15)
LYMPHOCYTES # BLD AUTO: 1.5 10E3/UL (ref 0.8–5.3)
LYMPHOCYTES NFR BLD AUTO: 30 %
MCH RBC QN AUTO: 30.5 PG (ref 26.5–33)
MCHC RBC AUTO-ENTMCNC: 34 G/DL (ref 31.5–36.5)
MCV RBC AUTO: 90 FL (ref 78–100)
MONOCYTES # BLD AUTO: 0.7 10E3/UL (ref 0–1.3)
MONOCYTES NFR BLD AUTO: 14 %
NEUTROPHILS # BLD AUTO: 2.5 10E3/UL (ref 1.6–8.3)
NEUTROPHILS NFR BLD AUTO: 49 %
NRBC # BLD AUTO: 0 10E3/UL
NRBC BLD AUTO-RTO: 0 /100
PLATELET # BLD AUTO: 133 10E3/UL (ref 150–450)
POTASSIUM SERPL-SCNC: 3.7 MMOL/L (ref 3.4–5.3)
RBC # BLD AUTO: 4 10E6/UL (ref 3.8–5.2)
RETICS # AUTO: 0.09 10E6/UL (ref 0.03–0.1)
RETICS/RBC NFR AUTO: 2.1 % (ref 0.5–2)
SODIUM SERPL-SCNC: 136 MMOL/L (ref 135–145)
WBC # BLD AUTO: 5.1 10E3/UL (ref 4–11)

## 2024-02-24 PROCEDURE — 250N000013 HC RX MED GY IP 250 OP 250 PS 637: Performed by: PHYSICIAN ASSISTANT

## 2024-02-24 PROCEDURE — 85610 PROTHROMBIN TIME: CPT | Performed by: HOSPITALIST

## 2024-02-24 PROCEDURE — 250N000011 HC RX IP 250 OP 636: Performed by: PHYSICIAN ASSISTANT

## 2024-02-24 PROCEDURE — 250N000013 HC RX MED GY IP 250 OP 250 PS 637: Performed by: HOSPITALIST

## 2024-02-24 PROCEDURE — 99239 HOSP IP/OBS DSCHRG MGMT >30: CPT | Performed by: PHYSICIAN ASSISTANT

## 2024-02-24 PROCEDURE — 85045 AUTOMATED RETICULOCYTE COUNT: CPT | Performed by: PHYSICIAN ASSISTANT

## 2024-02-24 PROCEDURE — 36415 COLL VENOUS BLD VENIPUNCTURE: CPT | Performed by: HOSPITALIST

## 2024-02-24 PROCEDURE — 85060 BLOOD SMEAR INTERPRETATION: CPT | Performed by: PATHOLOGY

## 2024-02-24 PROCEDURE — 85025 COMPLETE CBC W/AUTO DIFF WBC: CPT | Performed by: PHYSICIAN ASSISTANT

## 2024-02-24 PROCEDURE — 80048 BASIC METABOLIC PNL TOTAL CA: CPT | Performed by: PHYSICIAN ASSISTANT

## 2024-02-24 RX ORDER — WARFARIN SODIUM 2.5 MG/1
2.5 TABLET ORAL
Status: DISCONTINUED | OUTPATIENT
Start: 2024-02-24 | End: 2024-02-24 | Stop reason: HOSPADM

## 2024-02-24 RX ORDER — FUROSEMIDE 20 MG
20 TABLET ORAL DAILY
Qty: 30 TABLET | Refills: 1 | Status: SHIPPED | OUTPATIENT
Start: 2024-02-26 | End: 2024-04-23

## 2024-02-24 RX ORDER — POTASSIUM CHLORIDE 1500 MG/1
20 TABLET, EXTENDED RELEASE ORAL ONCE
Status: COMPLETED | OUTPATIENT
Start: 2024-02-24 | End: 2024-02-24

## 2024-02-24 RX ORDER — FUROSEMIDE 20 MG
20 TABLET ORAL 2 TIMES DAILY
Qty: 4 TABLET | Refills: 0 | Status: ON HOLD | OUTPATIENT
Start: 2024-02-24 | End: 2024-04-01

## 2024-02-24 RX ADMIN — POTASSIUM CHLORIDE 20 MEQ: 1500 TABLET, EXTENDED RELEASE ORAL at 08:49

## 2024-02-24 RX ADMIN — GLIPIZIDE 2.5 MG: 2.5 TABLET, FILM COATED, EXTENDED RELEASE ORAL at 08:49

## 2024-02-24 RX ADMIN — FUROSEMIDE 40 MG: 10 INJECTION, SOLUTION INTRAMUSCULAR; INTRAVENOUS at 00:16

## 2024-02-24 RX ADMIN — FUROSEMIDE 40 MG: 10 INJECTION, SOLUTION INTRAMUSCULAR; INTRAVENOUS at 08:49

## 2024-02-24 RX ADMIN — INDAPAMIDE 2.5 MG: 1.25 TABLET, FILM COATED ORAL at 08:49

## 2024-02-24 ASSESSMENT — ACTIVITIES OF DAILY LIVING (ADL)
ADLS_ACUITY_SCORE: 27
DEPENDENT_IADLS:: CLEANING;MEDICATION MANAGEMENT;TRANSPORTATION
ADLS_ACUITY_SCORE: 27

## 2024-02-24 NOTE — PROGRESS NOTES
PRIMARY DIAGNOSIS: CONGESTIVE HEART FAILURE  OUTPATIENT/OBSERVATION GOALS TO BE MET BEFORE DISCHARGE:  Dyspnea improved and O2 sats >88% at RA or at prior home O2 therapy level: Yes        SpO2: 96 %, O2 Device: None (Room air)        Vitals:     02/22/24 1330 02/22/24 2131 02/23/24 0542   Weight: 114.2 kg (251 lb 12.3 oz) 111.9 kg (246 lb 12.8 oz) 111.4 kg (245 lb 11.2 oz)         ECHO and other diagnostic testing complete (if applicable): Yes - see results     Return to near baseline physical activity: Yes        Discharge Planner Nurse   Safe discharge environment identified: Yes  Barriers to discharge: No    Pt is A/O x4, with SBA and 4ww. Denies any SOB. Lungs diminished. VSS, on RA. Tele: Afib CVR. Notified by tele tech of 3.0 and 3.2 second pause. PA updated and instructed to notify if >4.0 seconds. Pt is receiving IV Lasix Q8 hours. +1/2 edema. Purewick in place with excellent UOP. Cardiac diet, tolerating. K and Mag protocol, both replaced today. Mag recheck at 2045 and K in AM.     Please review provider order for any additional goals.   Nurse to notify provider when observation goals have been met and patient is ready for discharge.

## 2024-02-24 NOTE — PROGRESS NOTES
PRIMARY DIAGNOSIS: CONGESTIVE HEART FAILURE  OUTPATIENT/OBSERVATION GOALS TO BE MET BEFORE DISCHARGE:  Dyspnea improved and O2 sats >88% at RA or at prior home O2 therapy level: Yes        SpO2: 94 %, O2 Device: None (Room air)  Vitals:    02/22/24 2131 02/23/24 0542 02/24/24 0652   Weight: 111.9 kg (246 lb 12.8 oz) 111.4 kg (245 lb 11.2 oz) 108 kg (238 lb)        ECHO and other diagnostic testing complete (if applicable): Yes    Return to near baseline physical activity: Yes    Discharge Planner Nurse   Safe discharge environment identified: Yes  Barriers to discharge: No       Entered by: Syeda Mcqueen RN 02/24/2024 1:15 PM     VSS on RA, A&Ox4. Improvement in edema and weight, voiding well with lasix 40 BID. Tolerating a diet well, SL. No pain. Plan to walk in hallway with nursing to evaluate ambulation and consider discharge home.     Please review provider order for any additional goals.   Nurse to notify provider when observation goals have been met and patient is ready for discharge.

## 2024-02-24 NOTE — DISCHARGE SUMMARY
[facility]  Discharge Summary        Savanna Rehman MRN# [id number]   YOB: 1940 Age: 83 year old     Date of Admission:  2/22/2024  Date of Discharge:  2/24/2024  2:09 PM  Admitting Physician:  Hardik Doshi MD  Discharge Physician: Jennifer Oreilly PA-C  Discharging Service: Hospitalist    Primary Provider: Taylor Payan  Primary Care Physician Phone Number: [phone]         Discharge Diagnoses/Problem Oriented Hospital Course (Providers):      Savanna Rehman was admitted on 2/22/2024 by Hardik Doshi MD and I would refer you to their history and physical.  The following problems were addressed during her hospitalization:    Acute exacerbation of diastolic HF  BLE edema due to above  and FERREIRA, s/p diuresis   Asymptomatic cardiac pauses   Chronic Thrombocytopenia  Afib - rate controlled   DM   FERREIRA - recent diagnosis, plan for outpt hepatic follow up   CRISTIANA non compliant with CPAP          Code Status:      Full Code        Brief Hospital Stay Summary Sent Home With Patient in AVS:          Reason for your hospital stay      You were admitted for concerns of acute exacerbation of heart failure.   You were diuresed with IV lasix with improvement. You will need to   continue Lasix 20mg for twice a day for 2 days then decrease to 20mg   daily. Follow up with PCP in 1 week. We will get repeat labs INR and BMP   on Tuesday with home health nurse.        Savanna Rehman is a 83 year old female with a past medical history of chronic A-fib on Coumadin, hypertension, heart failure with preserved ejection fraction, type 2 diabetes mellitus, CRISTIANA with noncompliance who presents with lower extremity edema and weight gain.     In ED, patient afebrile and nontachycardic.  Normotensive.  Saturating well on room air.  CBC notable for platelet count of 117.  BMP with renal function at baseline.  BNP not significantly elevated at 1200.  High-sensitivity troponin mildly elevated at 26.   Chest x-ray done without any acute infiltrate.  Patient was given 40 mg of IV Lasix.     #Lower extremity edema, subjective SOB suspect secondary to volume overload. Acute on chronic HFpEF: Patient notes that she has gained 14 pounds in the last 3 to 4 days.  She has noted increased lower extremity swelling.  She has had some shortness of breath with exertion.  No chest pain.  No increased nausea or vomiting but does have some nausea at baseline.  She tells me she has been told she should be using CPAP but does not use it.    --Repeat ECHO this admission, shows preserved EF at 55-60% unchanged from last echocardiogram from February 2020   --Does have increased  right ventricular systolic fxn consistent with pulm HTN, not surprising given CPAP non compliance   -- Weight 245 today, baseline 236-238.   - Weight 238 at discharge after IV diuresis   -- Instructed pt to increase lasix to 20mg BID for 2 more days then reduce to 20mg daily   - Follow up with PCP in 1 week for repeat BMP     #2-3 sec Pauses   - Noted by RN regarding multiple 2-3 sec pauses  - Asymptomatic   - No medication changes made.   - K and Mg WNL      #Mild elevation of Hs troponin: No chest pain. ECG with afib and slow ventricular rate.  Repeat troponin to ensure no significant uptrend.   - ECHO shows no new concern for ischemia      #Thrombocytopenia: Has had some thrombocytopenia in past based on labs.  No bleeding noted. Hgb OK. May be related to hepatic congestion from right-sided heart failure.  -peripheral smear pending at discharge      #Afib with slow ventricular response. Coagulopathy secondary to warfarin: ECG as above with afib and rates in 50's.  Not on rate-controlling meds. Continue warfarin pharm to dose.    - Stable, no changes made      #Diabetes mellitus: Continue home glipizide.    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    - Still needs f/u with GI  - She tells me she thinks she has  appt with [facility] in 1 month      #CRISTIANA, noncompliant with CPAP: Encouraged use of CPAP.  Discussed adverse effects on cardiac health with not using CPAP.  - Has e/o increased Pulm pressure on ECHO          Important Results:        Recent Labs   Lab 02/29/24  1115   WBC 4.4   HGB 13.2   HCT 39.8   MCV 91   *     Recent Labs   Lab 02/29/24  1115      POTASSIUM 4.2   CHLORIDE 95*   CO2 32*   ANIONGAP 10   *   BUN 34.4*   CR 1.29*   GFRESTIMATED 41*   SAUL 9.5     7-Day Micro Results       No results found for the last 168 hours.                   Pending Results:        Unresulted Labs Ordered in the Past 30 Days of this Admission       No orders found from 1/23/2024 to 2/23/2024.              Discharge Instructions and Follow-Up:      Follow-up Appointments     Follow-up and recommended labs and tests       Follow up with primary care provider, Taylor Payan, within 7 days for   hospital follow- up.  The following labs/tests are recommended: BMP and   INR.              Discharge Disposition:        Discharged to home         Discharge Medications:        Discharge Medication List as of 2/24/2024  1:14 PM        CONTINUE these medications which have CHANGED    Details   !! furosemide (LASIX) 20 MG tablet Take 1 tablet (20 mg) by mouth 2 times daily for 2 days, Disp-4 tablet, R-0, E-Prescribe      !! furosemide (LASIX) 20 MG tablet Take 1 tablet (20 mg) by mouth daily, Disp-30 tablet, R-1, E-Prescribe       !! - Potential duplicate medications found. Please discuss with provider.        CONTINUE these medications which have NOT CHANGED    Details   acetaminophen (TYLENOL) 500 MG tablet Take 1,000 mg by mouth every 6 hours as needed for mild pain, Historical      Cholecalciferol (VITAMIN D-3) 125 MCG (5000 UT) TABS Take 5,000 Units by mouth daily, OTC      escitalopram (LEXAPRO) 5 MG tablet Take 5 mg by mouth at bedtime, Historical      glipiZIDE (GLUCOTROL XL) 2.5 MG 24 hr tablet Take 1 tablet (2.5 mg)  by mouth 2 times daily, Disp-180 tablet, R-3, E-PrescribePt restarted and will call for refill.      indapamide (LOZOL) 2.5 MG tablet Take 1 tablet (2.5 mg) by mouth every morning, Disp-90 tablet, R-1, E-Prescribe      loperamide (IMODIUM A-D) 2 MG capsule Take 1 capsule (2 mg) by mouth 4 times daily as needed for diarrhea, Disp-60 capsule, R-0, E-Prescribe      loperamide (IMODIUM A-D) 2 MG tablet Take 2 mg by mouth 2 times daily, Historical      warfarin ANTICOAGULANT (COUMADIN) 2.5 MG tablet Take by mouth daily 3.75mg = Mon, 2.5mg=ROW, Historical               Allergies:         Allergies   Allergen Reactions    Augmented Betamethasone Diprop [Betamethasone] Other (See Comments)     Severe yeast infection    Petroleum Jelly [Petrolatum] Anaphylaxis     Rash and swelling    Shellfish-Derived Products Anaphylaxis     Tongue swelling    Aspirin Swelling     tiongue swelling    Bacitracin      Rash swelling    Bactrim [Sulfamethoxazole-Trimethoprim] Dizziness    Coumadin [Warfarin] Swelling     Leg swelling    Darvon [Propoxyphene] Swelling     Throat closes    Dilaudid [Hydromorphone]      No side effects from fentanyl June 2023    Levaquin [Levofloxacin] Swelling     Tongue swelling    Lidocaine     Morphine Unknown     [facility] 2/23/24    Neomycin Swelling     rash    Neosporin [Neomycin-Polymyxin-Gramicidin] Swelling     rash    Nitrofurantoin      SOB, GI upset,    Oxycodone      Severe itching    Percocet [Oxycodone-Acetaminophen] Unknown    Percodan [Oxycodone-Aspirin]      Severe itching    Polymyxin B     Pramoxine     Tramadol     Vicodin [Hydrocodone-Acetaminophen]      Severe itching      Xarelto [Rivaroxaban]     Adhesive Tape Rash     Band aids     Codeine Rash    Hydrocortisone Rash and Swelling    Other Environmental Allergy Rash     Adhesive tape   Band aids            Consultations This Hospital Stay:        No consultations were requested during this admission         Condition and  "Physical on Discharge:        Discharge condition: Stable   Vitals: Blood pressure 125/53, pulse 64, temperature 98  F (36.7  C), temperature source Oral, resp. rate 18, height 1.727 m (5' 8\"), weight 108 kg (238 lb), SpO2 94%, not currently breastfeeding.  238 lbs 0 oz      Constitutional: Awake, alert, cooperative, no apparent distress      Respiratory: Slight crackles at bases L> Rtor wheezing   Cardiovascular: Regular rate and rhythm, normal S1 and S2, and no murmur noted   Abdomen: Normal bowel sounds, soft, non-distended, non-tender   Skin: No rashes, no cyanosis, dry to touch, trace to 1+pitting edema of the BL E    Neuro: Alert and oriented x3, no weakness, numbness, memory loss   Extremities: No edema, normal range of motion   Other(s):           All other systems: Negative              Discharge Time:      < 30 mins         Image Results From This Hospital Stay (For Non-EPIC Providers):        Results for orders placed or performed during the hospital encounter of 24   XR Chest 2 Views    Narrative    EXAM: XR CHEST 2 VIEWS  LOCATION: [facility]  DATE: 2024    INDICATION: Fluid overload  COMPARISON: Chest radiograph 2021, CT 2023      Impression    IMPRESSION: Stable enlargement of the cardiac silhouette. Subtle areas of fibrosis without mike airspace consolidation or edema. No significant pleural effusion or pneumothorax. Bones are unchanged.   Echocardiogram Complete     Value    LVEF  55-60%    Narrative    [id number]  [id number]  [id number]  [id number]^DANIELLE^SU     [facility]  Echocardiography Laboratory  [address] [place] [address]     Name: KRIS SANDERSON  MRN: [id number]  : 1940  Study Date: 2024 10:02 AM  Age: 83 yrs  Gender: Female  Patient Location: [facility]  Reason For Study: Edema  Ordering Physician: SU SANTOS  Performed By: [facility] Alem Scott     BSA: 2.3 m2  Height: 68 in  Weight: 251 lb  HR: 53  BP: " 160/96 mmHg  ______________________________________________________________________________  Procedure  Complete Portable Echo Adult. Optison (NDC #[id number]) given intravenously.  ______________________________________________________________________________  Interpretation Summary     1. The left ventricle is normal in size. There is normal left ventricular wall  thickness. Left ventricular systolic function is normal. The visual ejection  fraction is 55-60%. Diastolic function not assessed due to atrial  fibrillation. No regional wall motion abnormalities noted. There is no  thrombus seen in the left ventricle.  2. The right ventricle is mildly dilated. The right ventricular systolic  function is normal.  3. Mild to moderate biatrial enlargement.  4. Moderate tricuspid regurgitation. Right ventricular systolic pressure is  elevated, consistent with mild pulmonary hypertension.  5. No pericardial effusion.  6. In comparison to the previous report dated 04/14/2021, the findings are  similar.  ______________________________________________________________________________  Left Ventricle  The left ventricle is normal in size. There is normal left ventricular wall  thickness. Left ventricular systolic function is normal. The visual ejection  fraction is 55-60%. Diastolic function not assessed due to atrial  fibrillation. No regional wall motion abnormalities noted. There is no  thrombus seen in the left ventricle.     Right Ventricle  The right ventricle is mildly dilated. The right ventricular systolic function  is normal.     Atria  There is mild-moderate biatrial enlargement. There is no color Doppler  evidence of an atrial shunt.     Mitral Valve  There is mild mitral annular calcification. There is trace to mild mitral  regurgitation.     Tricuspid Valve  There is moderate (2+) tricuspid regurgitation. The right ventricular systolic  pressure is approximated at 28.4 mmHg plus the right atrial pressure.  IVC  diameter >2.1 cm collapsing <50% with sniff suggests a high RA pressure  estimated at 15 mmHg or greater. Right ventricular systolic pressure is  elevated, consistent with mild pulmonary hypertension.     Aortic Valve  There is mild trileaflet aortic sclerosis. There is mild (1+) aortic  regurgitation. No aortic stenosis is present.     Pulmonic Valve  There is trace pulmonic valvular regurgitation. There is no pulmonic valvular  stenosis.     Vessels  The aortic root is normal size. Normal size ascending aorta. Dilation of the  inferior vena cava is present with abnormal respiratory variation in diameter.     Pericardium  There is no pericardial effusion.     Rhythm  The rhythm was atrial fibrillation.  ______________________________________________________________________________  MMode/2D Measurements & Calculations     IVSd: 0.88 cm  LVIDd: 4.4 cm  LVIDs: 2.7 cm  LVPWd: 0.93 cm  FS: 38.7 %  LV mass(C)d: 129.5 grams  LV mass(C)dI: 57.5 grams/m2  Ao root diam: 3.1 cm  asc Aorta Diam: 3.5 cm  LVOT diam: 2.0 cm  LVOT area: 3.3 cm2  Ao root diam index Ht(cm/m): 1.8  Ao root diam index BSA (cm/m2): 1.4  Asc Ao diam index BSA (cm/m2): 1.5  Asc Ao diam index Ht(cm/m): 2.0  LA Volume (BP): 79.7 ml     LA Volume Index (BP): 35.4 ml/m2  RV Base: 4.5 cm  RWT: 0.42  TAPSE: 1.9 cm     Doppler Measurements & Calculations  MV max P.9 mmHg  MV mean P.4 mmHg  MV V2 VTI: 28.4 cm  PA V2 max: 97.5 cm/sec  PA max PG: 3.8 mmHg  PA acc time: 0.11 sec  TR max everett: 266.6 cm/sec  TR max P.4 mmHg  RV S [place]: 10.8 cm/sec     ______________________________________________________________________________  Report approved by: June Lozada 2024 11:29 AM           *Note: Due to a large number of results and/or encounters for the requested time period, some results have not been displayed. A complete set of results can be found in Results Review.

## 2024-02-24 NOTE — TELEPHONE ENCOUNTER
Patient calling from the hospital to ask what her last weight was at the clinic. After a chart review, patient's weight on 2/8/2024 was 237 lbs. Patient was admitted to the hospital on 2/22/24 for fluid retention and weight was at 246lbs. Patient states she has been given lasix and weight is now back down to 238lbs. Patient had no further questions.    Maria Fernanda Ventura RN  02/24/24 8:31 AM  [facility] Nurse Advisor

## 2024-02-24 NOTE — CONSULTS
Care Management Initial Consult    General Information  Assessment completed with: Patient,    Type of CM/SW Visit: Initial Assessment    Primary Care Provider verified and updated as needed: Yes   Readmission within the last 30 days: no previous admission in last 30 days      Reason for Consult: discharge planning    Communication Assessment  Patient's communication style: spoken language (English or Bilingual)    Hearing Difficulty or Deaf: no   Wear Glasses or Blind: yes    Cognitive  Cognitive/Neuro/Behavioral: WDL                      Living Environment:   People in home: alone     Current living Arrangements: apartment      Able to return to prior arrangements: yes     Family/Social Support:  Care provided by: self, homecare agency  Provides care for: no one     Children          Description of Support System: Supportive, Involved    Support Assessment: Adequate family and caregiver support    Current Resources:   Patient receiving home care services: Yes  Skilled Home Care Services: Skilled Nursing  Community Resources: County Worker, Transportation Services, County Programs, Housekeeping/Chore Agency  Equipment currently used at home: walker, rolling  Supplies currently used at home: Incontinence Supplies    Lifestyle & Psychosocial Needs:  Social Determinants of Health     Food Insecurity: Low Risk  (2/1/2024)    Food Insecurity     Within the past 12 months, did you worry that your food would run out before you got money to buy more?: No     Within the past 12 months, did the food you bought just not last and you didn t have money to get more?: No   Depression: At risk (1/12/2024)    PHQ-2     PHQ-2 Score: 3   Housing Stability: Low Risk  (2/1/2024)    Housing Stability     Do you have housing? : Yes     Are you worried about losing your housing?: No   Tobacco Use: Medium Risk (2/13/2024)    Patient History     Smoking Tobacco Use: Former     Smokeless Tobacco Use: Never     Passive Exposure: Past    Financial Resource Strain: Low Risk  (2/1/2024)    Financial Resource Strain     Within the past 12 months, have you or your family members you live with been unable to get utilities (heat, electricity) when it was really needed?: No   Alcohol Use: Not At Risk (1/27/2022)    AUDIT-C     Frequency of Alcohol Consumption: Monthly or less     Average Number of Drinks: 1 or 2     Frequency of Binge Drinking: Never   Transportation Needs: High Risk (2/1/2024)    Transportation Needs     Within the past 12 months, has lack of transportation kept you from medical appointments, getting your medicines, non-medical meetings or appointments, work, or from getting things that you need?: Yes   Physical Activity: Not on file   Interpersonal Safety: Low Risk  (9/21/2023)    Interpersonal Safety     Do you feel physically and emotionally safe where you currently live?: Yes     Within the past 12 months, have you been hit, slapped, kicked or otherwise physically hurt by someone?: No     Within the past 12 months, have you been humiliated or emotionally abused in other ways by your partner or ex-partner?: No   Stress: Stress Concern Present (1/27/2022)    [employer] - Occupational Stress Questionnaire     Feeling of Stress : Very much   Social Connections: Not on file     Functional Status:  Prior to admission patient needed assistance:   Dependent ADLs:: Ambulation-walker  Dependent IADLs:: Cleaning, Medication Management, Transportation  Assesssment of Functional Status: At functional baseline    Care Management Discharge Note    Discharge Date: 02/25/2024     Discharge Disposition: Home, Home Care    Discharge Services: None    Discharge DME: None    Discharge Transportation: family or friend will provide    Patient/family educated on Medicare website which has current facility and service quality ratings: yes    Education Provided on the Discharge Plan: Yes  Persons Notified of Discharge Plans:  Pt  Patient/Family in Agreement with the Plan: yes    Additional Information:  CM consulted to help arrange HC RN/PT at discharge. Met with pt at bedside to discuss. Pt lives in an senior apartment and is independent with WW at baseline, she uses a power scooter for longer distances outside of the apartment. She has a home RN through [facility] paid for by the [facility], she helps pt with weekly medication set-up. She reports that she has a meeting this week with the [facility] and will be eligible for PCA services 5 hours/wk. She relies on [employer] and apartment bus to transport her to and from appointments and errands. She reports that family will transport her home at discharge.     Pt declined HC PT services at discharge, she reports that she has had HC PT previously and she does not feel she would benefit from this at this time. Updated provider. Will send resumption orders for HC RN to [facility] at discharge.     Latia Gao RN BSN   Inpatient Care Coordination  [facility]   Phone [phone]

## 2024-02-24 NOTE — PLAN OF CARE
"[id number]    Inpatient Progress Note:CHF    /63 (BP Location: Right arm)   Pulse 65   Temp 97.6  F (36.4  C) (Oral)   Resp 18   Ht 1.727 m (5' 8\")   Wt 111.4 kg (245 lb 11.2 oz)   LMP  (LMP Unknown)   SpO2 94%   BMI 37.36 kg/m         Orientation: Alert and Oriented x4  Neuro: Intact  Pain status: Denies  Activity: SBA with Gait Belt and Walker  Peripheral edema: +2 lower legs bilateral  Resp: LS diminished  Cardiac: Irregular rhythm on tele . Afib cvr 50s  GI: diarrhea resolved with given imodium   :  Incontinent / Purewick in place   Skin: intact  LDA: PIV  Infusions: SL   Pertinent Labs: K+ and Mag+ protocol  Diet: Cardiac   Consults: SW  Discharge Plan: discharge if meets diuresis goal.    Will continue to monitor and provide cares.     Ra Ralph RN         "
PRIMARY DIAGNOSIS: CONGESTIVE HEART FAILURE  OUTPATIENT/OBSERVATION GOALS TO BE MET BEFORE DISCHARGE:  Dyspnea improved and O2 sats >88% at RA or at prior home O2 therapy level: Yes        SpO2: 97 %, O2 Device: None (Room air)  Vitals:    02/22/24 1330 02/22/24 2131   Weight: 114.2 kg (251 lb 12.3 oz) 111.9 kg (246 lb 12.8 oz)        ECHO and other diagnostic testing complete (if applicable): No, echo in the morning     Return to near baseline physical activity: Yes    Discharge Planner Nurse   Safe discharge environment identified: Yes  Barriers to discharge: Yes       Entered by: Trinh Palmer RN 02/23/2024 2:18 AM     Please review provider order for any additional goals.   Nurse to notify provider when observation goals have been met and patient is ready for discharge.    Vitals are Temp: 98  F (36.7  C) Temp src: Oral BP: (!) 166/71 Pulse: 54   Resp: 18 SpO2: 97 %.  Patient is Alert and Oriented x4. They are SBA with Gait Belt and Walker.  Pt is a Low fat diet.  They are denying pain.  Patient is Saline locked. Denies nausea/dizziness/SOB. Purewick in place per patient request. States she has loose stools as baseline. Numbness in BLE. IV Lasix BID. I&Os. Daily weight. On tele, A-fib SVR/CVR w/ PVC. UC pending. Plan is for echo in the morning and to continue IV Lasix.      
PRIMARY DIAGNOSIS: CONGESTIVE HEART FAILURE  OUTPATIENT/OBSERVATION GOALS TO BE MET BEFORE DISCHARGE:  Dyspnea improved and O2 sats >88% at RA or at prior home O2 therapy level: Yes        SpO2: 97 %, O2 Device: None (Room air)  Vitals:    02/22/24 1330 02/22/24 2131   Weight: 114.2 kg (251 lb 12.3 oz) 111.9 kg (246 lb 12.8 oz)        ECHO and other diagnostic testing complete (if applicable): No, echo in the morning     Return to near baseline physical activity: Yes    Discharge Planner Nurse   Safe discharge environment identified: Yes  Barriers to discharge: Yes       Entered by: Trinh Palmer RN 02/23/2024 4:13 AM     Please review provider order for any additional goals.   Nurse to notify provider when observation goals have been met and patient is ready for discharge.    Vitals are Temp: 98  F (36.7  C) Temp src: Oral BP: (!) 166/71 Pulse: 54   Resp: 18 SpO2: 97 %.  Patient is Alert and Oriented x4. They are SBA with Gait Belt and Walker.  Pt is a Low fat diet.  Patient is Saline locked. Purewick in place per patient request. IV Lasix BID. I&Os. Daily weight. On tele, A-fib SVR/CVR w/ PVC. UC pending. Plan is for echo in the morning and to continue IV Lasix.      
ROOM # 213-1    Living Situation (if not independent, order SW consult):  [facility]   Facility name: [facility]   : Roya Keane     Activity level at baseline: IND w/ walker   Activity level on admit: SBA w/ walker     Who will be transporting you at discharge: TBD    Patient registered to observation; given Patient Bill of Rights; given the opportunity to ask questions about observation status and their plan of care.  Patient has been oriented to the observation room, bathroom and call light is in place.    Discussed discharge goals and expectations with patient/family.               
Yes

## 2024-02-24 NOTE — PROGRESS NOTES
PRIMARY DIAGNOSIS: CONGESTIVE HEART FAILURE  OUTPATIENT/OBSERVATION GOALS TO BE MET BEFORE DISCHARGE:  Dyspnea improved and O2 sats >88% at RA or at prior home O2 therapy level: Yes        SpO2: 94 %, O2 Device: None (Room air)  Vitals:    02/22/24 2131 02/23/24 0542 02/24/24 0652   Weight: 111.9 kg (246 lb 12.8 oz) 111.4 kg (245 lb 11.2 oz) 108 kg (238 lb)        ECHO and other diagnostic testing complete (if applicable): Yes    Return to near baseline physical activity: Yes    Discharge Planner Nurse   Safe discharge environment identified: Yes  Barriers to discharge: No       Entered by: Syeda Mcqueen RN 02/24/2024 9:35 AM     VSS on RA, A&Ox4. Improvement in edema and weight, voiding well with lasix 40 BID. Tolerating a diet well, SL. No pain. Plan to walk in hallway with nursing to evaluate ambulation and consider discharge home.     Please review provider order for any additional goals.   Nurse to notify provider when observation goals have been met and patient is ready for discharge.

## 2024-02-24 NOTE — PHARMACY-ANTICOAGULATION SERVICE
Clinical Pharmacy- Warfarin Discharge Note  This patient is currently on warfarin for the treatment of Atrial fibrillation.  INR Goal= 2-3  Expected length of therapy lifetime.    Warfarin PTA Regimen: Take by mouth daily 3.75mg = Mon, 2.5mg=ROW      Anticoagulation Dose History  More data exists         Latest Ref Rng & Units 1/2/2024 1/9/2024 1/23/2024 2/6/2024 2/22/2024 2/23/2024 2/24/2024   Recent Dosing and Labs   warfarin ANTICOAGULANT (COUMADIN) 1 MG tablet - - - - - 3 mg, $Given 4 mg, $Given -   INR 0.85 - 1.15 1.8     1.7     2.8     2.4     1.95  1.91  1.60       Details          This result is from an external source.               Reviewed PTA, inpt and discharge meds.  Agree with pt plan to discharge on PTA dose with INR on Tuesday.

## 2024-02-24 NOTE — PROGRESS NOTES
PRIMARY DIAGNOSIS: CONGESTIVE HEART FAILURE  OUTPATIENT/OBSERVATION GOALS TO BE MET BEFORE DISCHARGE:  Dyspnea improved and O2 sats >88% at RA or at prior home O2 therapy level: Yes        SpO2: 94 %, O2 Device: None (Room air)  Vitals:    02/22/24 2131 02/23/24 0542 02/24/24 0652   Weight: 111.9 kg (246 lb 12.8 oz) 111.4 kg (245 lb 11.2 oz) 108 kg (238 lb)        ECHO and other diagnostic testing complete (if applicable): Yes    Return to near baseline physical activity: Yes    Discharge Planner Nurse   Safe discharge environment identified: Yes  Barriers to discharge: No       Entered by: Syeda Mcqueen RN 02/24/2024 1:58 PM     VSS on RA, A&Ox4. Improvement in edema and weight, voiding well with lasix 40 BID. Tolerating a diet well, SL. No pain. Walked in hallway well. Discharging home soon.     Please review provider order for any additional goals.   Nurse to notify provider when observation goals have been met and patient is ready for discharge.

## 2024-02-26 ENCOUNTER — DOCUMENTATION ONLY (OUTPATIENT)
Dept: ANTICOAGULATION | Facility: CLINIC | Age: 84
End: 2024-02-26
Payer: COMMERCIAL

## 2024-02-26 ENCOUNTER — TELEPHONE (OUTPATIENT)
Dept: INTERNAL MEDICINE | Facility: CLINIC | Age: 84
End: 2024-02-26
Payer: COMMERCIAL

## 2024-02-26 ENCOUNTER — PATIENT OUTREACH (OUTPATIENT)
Dept: CARE COORDINATION | Facility: CLINIC | Age: 84
End: 2024-02-26
Payer: COMMERCIAL

## 2024-02-26 DIAGNOSIS — I48.20 CHRONIC ATRIAL FIBRILLATION (H): ICD-10-CM

## 2024-02-26 DIAGNOSIS — I48.21 PERMANENT ATRIAL FIBRILLATION (H): Primary | ICD-10-CM

## 2024-02-26 LAB
ATRIAL RATE - MUSE: 56 BPM
DIASTOLIC BLOOD PRESSURE - MUSE: NORMAL MMHG
INTERPRETATION ECG - MUSE: NORMAL
P AXIS - MUSE: NORMAL DEGREES
PATH REPORT.COMMENTS IMP SPEC: NORMAL
PATH REPORT.COMMENTS IMP SPEC: NORMAL
PATH REPORT.FINAL DX SPEC: NORMAL
PATH REPORT.MICROSCOPIC SPEC OTHER STN: NORMAL
PATH REPORT.MICROSCOPIC SPEC OTHER STN: NORMAL
PATH REPORT.RELEVANT HX SPEC: NORMAL
PR INTERVAL - MUSE: NORMAL MS
QRS DURATION - MUSE: 106 MS
QT - MUSE: 470 MS
QTC - MUSE: 392 MS
R AXIS - MUSE: -14 DEGREES
SYSTOLIC BLOOD PRESSURE - MUSE: NORMAL MMHG
T AXIS - MUSE: 24 DEGREES
VENTRICULAR RATE- MUSE: 42 BPM

## 2024-02-26 NOTE — PROGRESS NOTES
[facility]: [facility]: Post-Discharge Note  SITUATION                                                      Admission:    Admission Date: 02/22/24   Reason for Admission: You were admitted for concerns of acute exacerbation of  heart failure. You were diuresed with IV lasix with  improvement.  Discharge:   Discharge Date: 02/24/24  Discharge Diagnosis: You were admitted for concerns of acute exacerbation of  heart failure. You were diuresed with IV lasix with  improvement.    BACKGROUND                                                      Per hospital discharge summary and inpatient provider notes:    Savanna Rehman is a 83 year old female with a past medical history of chronic A-fib on Coumadin, hypertension, heart failure with preserved ejection fraction, type 2 diabetes mellitus, CRISTIANA with noncompliance who presents with lower extremity edema and weight gain.     Patient notes that she has gained 14 pounds in the last 3 to 4 days.  She has noted increased lower extremity swelling.  She has had some shortness of breath with exertion.  No chest pain.  No increased nausea or vomiting but does have some nausea at baseline.  No abdominal pain.  Denies any fevers or chills.  No lightheadedness or presyncopal symptoms.  She denies any dysuria or frequency of urination.  She has been on diuretic at home.  She does note that she does not follow a strict low-salt diet though has been instructed she should.  She tells me she has been told she should be using CPAP but does not use it.  Denies any sick contacts.  No recent travel.  Lives in independent living with her .     In the ED, patient afebrile and nontachycardic.  Normotensive.  Saturating well on room air.  CBC notable for platelet count of 117.  BMP with renal function at baseline.  BNP not significantly elevated at 1200.  High-sensitivity troponin mildly elevated at 26.  Chest x-ray done without any acute infiltrate.  Patient  was given 40 mg of IV Lasix.    ASSESSMENT           Discharge Assessment  How are you doing now that you are home?: I am dizzy but I think it is from my new medications. I have a home health nurse coming to visit me tomorrow. I also feel a little nauseated.  How are your symptoms? (Red Flag symptoms escalate to triage hotline per guidelines): Unchanged  Do you feel your condition is stable enough to be safe at home until your provider visit?: Yes  Does the patient have their discharge instructions? : Yes  Does the patient have questions regarding their discharge instructions? : No  Were you started on any new medications or were there changes to any of your previous medications? : Yes (Pt had a few medication changes)  Does the patient have all of their medications?: Yes  Do you have questions regarding any of your medications? : No  Discharge follow-up appointment scheduled within 14 calendar days? : Yes  Discharge Follow Up Appointment Date: 02/28/24  Discharge Follow Up Appointment Scheduled with?: Primary Care Provider                  PLAN                                                      Outpatient Plan: Follow up with primary care provider, Taylor Payan, within 7 days for  hospital follow- up. The following labs/tests are recommended: BMP  and INR.    Future Appointments   Date Time Provider Department Center   2/28/2024  1:30 PM Taylor Payan MD [facility] [place]   3/28/2024 10:45 AM Aniyah Waller DO [facility]   7/17/2024 11:00 AM Sowmya Pollard APRN CNP [facility]         For any urgent concerns, please contact our 24 hour nurse triage line: [phone] ([phone])       STEWART Sharp  [phone]  [facility] Care Resource [facility]

## 2024-02-26 NOTE — TELEPHONE ENCOUNTER
See below discharge instructions from ER visit over the weekend.  Patient was not aware she was to take 2 lasix daily (40 mg) for 2 days then resume once a day dosing.  She will increase to 2 tabs today and tomorrow then resume once a day dosing and will see primary care provider Wed this week.      * furosemide 20 MG tablet  Commonly known as: LASIX  This may have changed: when to take this  Used for: Swelling of lower leg  Dose: 20 mg  Take 1 tablet (20 mg) by mouth 2 times daily for 2 days  Quantity: 4 tablet  Refills: 0    CONTINUE these medicines which may have CHANGED, or have new prescriptions. Dose / Directions  * furosemide 20 MG tablet  Commonly known as: LASIX  This may have changed: You were already taking a  medication with the same name, and this prescription  was added. Make sure you understand how and when to  take each.  Dose: 20 mg  Start taking on: February 26, 2024  Take 1 tablet (20 mg) by mouth daily  Quantity: 30 tablet  Refills: 1  BENITA Graham R.N.

## 2024-02-26 NOTE — PROGRESS NOTES
ANTICOAGULATION  MANAGEMENT: Discharge Review    Savanna Rehman chart reviewed for anticoagulation continuity of care    Hospital Admission on 2/22/24-2/24/24 for CHF.    Discharge disposition: Home    Results:    Recent labs: (last 7 days)     02/22/24  1500 02/23/24  0526 02/24/24  0538   INR 1.95* 1.91* 1.60*     Anticoagulation inpatient management:     less warfarin administered than maintenance regimen    Anticoagulation discharge instructions:     Warfarin dosing: home regimen continued   Bridging: No   INR goal change: No      Medication changes affecting anticoagulation: No    Additional factors affecting anticoagulation: Yes: fluctuation in weight can effect INR     PLAN     Recommend to check INR on 2/27/24    Spoke with home care nurse Evelin    Anticoagulation Calendar updated    Dorothy River RN

## 2024-02-27 ENCOUNTER — ANTICOAGULATION THERAPY VISIT (OUTPATIENT)
Dept: ANTICOAGULATION | Facility: CLINIC | Age: 84
End: 2024-02-27
Payer: COMMERCIAL

## 2024-02-27 ENCOUNTER — TELEPHONE (OUTPATIENT)
Dept: INTERNAL MEDICINE | Facility: CLINIC | Age: 84
End: 2024-02-27
Payer: COMMERCIAL

## 2024-02-27 DIAGNOSIS — I48.21 PERMANENT ATRIAL FIBRILLATION (H): Primary | ICD-10-CM

## 2024-02-27 DIAGNOSIS — I48.20 CHRONIC ATRIAL FIBRILLATION (H): ICD-10-CM

## 2024-02-27 LAB — INR (EXTERNAL): 1.4 (ref 0.9–1.1)

## 2024-02-27 NOTE — PROGRESS NOTES
ANTICOAGULATION MANAGEMENT     Savanna Rehman 83 year old female is on warfarin with subtherapeutic INR result. (Goal INR 2.0-3.0)    Recent labs: (last 7 days)     02/27/24  1300   INR 1.4*       ASSESSMENT     Source(s): Chart Review and Home Care/Facility Nurse     Warfarin doses taken: Less warfarin taken than planned which may be affecting INR and While hospitalized on 2/22: less warfarin administered than maintenance regimen.  Discharged on: home regimen continued-however, patient did not take home regimen dose but 2.5 mg Sat/Sun  Diet: No new diet changes identified  Medication/supplement changes: None noted  New illness, injury, or hospitalization: Yes: recently hospitalized 2/22-2/24 for CHF, was diuresed  Signs or symptoms of bleeding or clotting: No  Previous result: Subtherapeutic  Additional findings: None       PLAN     Recommended plan for temporary change(s) affecting INR     Dosing Instructions: booster dose then continue your current warfarin dose with next INR in 1 week       Summary  As of 2/27/2024      Full warfarin instructions:  2/27: 6.25 mg; Otherwise 2.5 mg every Mon; 3.75 mg all other days   Next INR check:  3/5/2024               Telephone call with Raj home care nurse who agrees to plan and repeated back plan correctly    Orders given to  Homecare nurse/facility to recheck    Education provided:   Please call back if any changes to your diet, medications or how you've been taking warfarin    Plan made per [facility] anticoagulation protocol    Roya Sky, RN  Anticoagulation Clinic  2/27/2024    _______________________________________________________________________     Anticoagulation Episode Summary       Current INR goal:  2.0-3.0   TTR:  53.6% (1 y)   Target end date:  Indefinite   Send INR reminders to:  NIKKI CHASE    Indications    Permanent atrial fibrillation (H) [I48.21]  Chronic atrial fibrillation (H) [I48.20]  Long term (current) use of anticoagulants (Resolved)  [Z79.01]             Comments:  Home care              Anticoagulation Care Providers       Provider Role Specialty Phone number    Patti Suárez MD Referring Internal Medicine [phone]

## 2024-02-27 NOTE — TELEPHONE ENCOUNTER
2/24/24, 1/20/24, 2/26/24 missed visit forms received via fax. Form in your mailbox to be signed.

## 2024-02-28 ENCOUNTER — VIRTUAL VISIT (OUTPATIENT)
Dept: INTERNAL MEDICINE | Facility: CLINIC | Age: 84
End: 2024-02-28
Payer: COMMERCIAL

## 2024-02-28 DIAGNOSIS — I50.32 HYPERTENSIVE HEART DISEASE WITH CHRONIC DIASTOLIC CONGESTIVE HEART FAILURE (H): ICD-10-CM

## 2024-02-28 DIAGNOSIS — I11.0 HYPERTENSIVE HEART DISEASE WITH CHRONIC DIASTOLIC CONGESTIVE HEART FAILURE (H): ICD-10-CM

## 2024-02-28 DIAGNOSIS — E11.22 TYPE 2 DIABETES MELLITUS WITH STAGE 3A CHRONIC KIDNEY DISEASE, WITHOUT LONG-TERM CURRENT USE OF INSULIN (H): ICD-10-CM

## 2024-02-28 DIAGNOSIS — N18.31 TYPE 2 DIABETES MELLITUS WITH STAGE 3A CHRONIC KIDNEY DISEASE, WITHOUT LONG-TERM CURRENT USE OF INSULIN (H): ICD-10-CM

## 2024-02-28 DIAGNOSIS — E83.42 HYPOMAGNESEMIA: ICD-10-CM

## 2024-02-28 DIAGNOSIS — D69.6 THROMBOCYTOPENIA (H): ICD-10-CM

## 2024-02-28 DIAGNOSIS — I48.21 PERMANENT ATRIAL FIBRILLATION (H): ICD-10-CM

## 2024-02-28 DIAGNOSIS — R42 LIGHT HEADEDNESS: ICD-10-CM

## 2024-02-28 DIAGNOSIS — F33.1 MODERATE EPISODE OF RECURRENT MAJOR DEPRESSIVE DISORDER (H): ICD-10-CM

## 2024-02-28 DIAGNOSIS — Z09 HOSPITAL DISCHARGE FOLLOW-UP: Primary | ICD-10-CM

## 2024-02-28 PROCEDURE — 99495 TRANSJ CARE MGMT MOD F2F 14D: CPT | Mod: 95 | Performed by: INTERNAL MEDICINE

## 2024-02-28 ASSESSMENT — PATIENT HEALTH QUESTIONNAIRE - PHQ9
SUM OF ALL RESPONSES TO PHQ QUESTIONS 1-9: 2
10. IF YOU CHECKED OFF ANY PROBLEMS, HOW DIFFICULT HAVE THESE PROBLEMS MADE IT FOR YOU TO DO YOUR WORK, TAKE CARE OF THINGS AT HOME, OR GET ALONG WITH OTHER PEOPLE: NOT DIFFICULT AT ALL
SUM OF ALL RESPONSES TO PHQ QUESTIONS 1-9: 2

## 2024-02-28 NOTE — PROGRESS NOTES
Savanna is a 83 year old who is being evaluated via a billable video visit.      How would you like to obtain your AVS? MyChart  If the video visit is dropped, the invitation should be resent by: Text to cell phone: [phone]  Will anyone else be joining your video visit? No          Assessment & Plan     Hospital discharge follow-up  Face-to-face visit completed today.  Savanna would benefit from physical therapy for general strengthening and improved mobility.  Patient currently ambulates with a motorized scooter and walker and would benefit from home physical therapy due to concerns with mobility as well as the need to use scooter/walker.  Medication reconciliation completed today.  Patient to continue with the current medications.  - Home Care Referral    Permanent atrial fibrillation (H)  On warfarin medication.  Follows up with the INR clinic.    Hypertensive heart disease with chronic diastolic congestive heart failure (H)  Update electrolytes/creatinine check.  Patient had most recent hospitalization due to concerns of fluid overload.  Had improvement in edema and weight with increasing Lasix to 40 mg twice daily.  Was recommended to continue the Lasix at 40 mg twice daily for 2 additional days postdischarge and indapamide at 2.5 mg.  Has been feeling symptoms of generalized weakness and lightheaded which we discussed could be related to the hospitalization.  To monitor blood pressure at home.  Continue with the Lasix at 20 mg daily indapamide at 2.5 mg.  - Basic metabolic panel  (Ca, Cl, CO2, Creat, Gluc, K, Na, BUN); Future    Type 2 diabetes mellitus with stage 3a chronic kidney disease, without long-term current use of insulin (H)  Most recent A1c lab work reviewed.  Stable.  Continue with glipizide at the current dose of 2.5 mg twice daily.    Light headedness  Symptoms of lightheaded with generalized weakness.  Patient feels that this could be related to the voiding that was increased while patient's  Lasix was uptitrated to 40 mg twice daily.  Discussed with the patient on monitoring blood pressure at home.  As well, physical therapy referral has been placed for generalized weakness.  Patient will be now taking Lasix at 20 mg daily with indapamide at 2.5 mg.  - Home Care Referral    Thrombocytopenia (H24)  - CBC with platelets; Future    Hypomagnesemia  Could be contributing to the symptoms of the generalized weakness.  Update magnesium lab work.  - Magnesium; Future    Moderate episode of recurrent major depressive disorder (H)  Taking escitalopram at 5 mg daily.  Continue with the medication at the current dose.          MED REC REQUIRED  Post Medication Reconciliation Status: Completed        Subjective   Savanna is a 83 year old, presenting for the following health issues:  Hospital F/U    HPI         2/26/2024    12:05 PM   Post Discharge Outreach   Admission Date 2/22/2024   Reason for Admission You were admitted for concerns of acute exacerbation of  heart failure. You were diuresed with IV lasix with  improvement.   Discharge Date 2/24/2024   Discharge Diagnosis You were admitted for concerns of acute exacerbation of  heart failure. You were diuresed with IV lasix with  improvement.   How are you doing now that you are home? I am dizzy but I think it is from my new medications. I have a home health nurse coming to visit me tomorrow. I also feel a little nauseated.   How are your symptoms? (Red Flag symptoms escalate to triage hotline per guidelines) Unchanged   Do you feel your condition is stable enough to be safe at home until your provider visit? Yes   Does the patient have their discharge instructions?  Yes   Does the patient have questions regarding their discharge instructions?  No   Were you started on any new medications or were there changes to any of your previous medications?  Yes   Does the patient have all of their medications? Yes   Do you have questions regarding any of your medications?   No   Discharge follow-up appointment scheduled within 14 calendar days?  Yes   Discharge Follow Up Appointment Date 2/28/2024   Discharge Follow Up Appointment Scheduled with? Primary Care Provider     Hospital Follow-up Visit:    Hospital/Nursing Home/IP Rehab Facility: [facility]  Date of Admission: 2/22/2024  Date of Discharge: 2/24/2024  Reason(s) for Admission: Congestive heart failure, unspecified HF chronicity, unspecified heart failure type     Was your hospitalization related to COVID-19? No   Problems taking medications regularly:  None  Medication changes since discharge: None  Problems adhering to non-medication therapy:  None    Summary of hospitalization:  [facility] discharge summary reviewed  Diagnostic Tests/Treatments reviewed.  Follow up needed: none  Other Healthcare Providers Involved in Patient s Care:         Physical Therapy  Update since discharge: improved.       Savanna presented to the emergency department with concerns of lower extremity edema and weight gain.  Patient was treated for acute on chronic heart failure.  Completed repeat echo during admission which showed preserved ejection fraction at 55 to 60% unchanged from last echocardiogram.  Will started on Lasix at 40 mg 3 times daily with strict I&O and daily weights.  Overall, symptoms improved and patient was discharged with recommendation to continue with Lasix twice daily for an additional 2 days postdischarge and resume normal Lasix and indapamide dosing.    Plan of care communicated with patient                 Review of Systems  Constitutional, HEENT, cardiovascular, pulmonary, gi and gu systems are negative, except as otherwise noted.      Objective           Vitals:  No vitals were obtained today due to virtual visit.    Physical Exam   GENERAL: alert and no distress  EYES: Eyes grossly normal to inspection.  No discharge or erythema, or obvious scleral/conjunctival  abnormalities.  RESP: No audible wheeze, cough, or visible cyanosis.    SKIN: Visible skin clear. No significant rash, abnormal pigmentation or lesions.  NEURO: Cranial nerves grossly intact.  Mentation and speech appropriate for age.  PSYCH: Appropriate affect, tone, and pace of words          Video-Visit Details    Type of service:  Video Visit       Originating Location (pt. Location): Home    Distant Location (provider location):  On-site  Platform used for Video Visit: [facility]  Signed Electronically by: Taylor Payan MD    Answers submitted by the patient for this visit:  Patient Health Questionnaire (Submitted on 2/28/2024)  If you checked off any problems, how difficult have these problems made it for you to do your work, take care of things at home, or get along with other people?: Not difficult at all  PHQ9 TOTAL SCORE: 2

## 2024-02-29 ENCOUNTER — NURSE TRIAGE (OUTPATIENT)
Dept: NURSING | Facility: CLINIC | Age: 84
End: 2024-02-29

## 2024-02-29 ENCOUNTER — ANTICOAGULATION THERAPY VISIT (OUTPATIENT)
Dept: ANTICOAGULATION | Facility: CLINIC | Age: 84
End: 2024-02-29

## 2024-02-29 ENCOUNTER — LAB (OUTPATIENT)
Dept: LAB | Facility: CLINIC | Age: 84
End: 2024-02-29
Payer: COMMERCIAL

## 2024-02-29 DIAGNOSIS — N18.30 CHRONIC KIDNEY DISEASE, STAGE 3 (H): Primary | ICD-10-CM

## 2024-02-29 DIAGNOSIS — I50.32 HYPERTENSIVE HEART DISEASE WITH CHRONIC DIASTOLIC CONGESTIVE HEART FAILURE (H): ICD-10-CM

## 2024-02-29 DIAGNOSIS — I48.21 PERMANENT ATRIAL FIBRILLATION (H): Primary | ICD-10-CM

## 2024-02-29 DIAGNOSIS — D69.6 THROMBOCYTOPENIA (H): ICD-10-CM

## 2024-02-29 DIAGNOSIS — I11.0 HYPERTENSIVE HEART DISEASE WITH CHRONIC DIASTOLIC CONGESTIVE HEART FAILURE (H): ICD-10-CM

## 2024-02-29 DIAGNOSIS — I48.20 CHRONIC ATRIAL FIBRILLATION (H): ICD-10-CM

## 2024-02-29 DIAGNOSIS — E83.42 HYPOMAGNESEMIA: ICD-10-CM

## 2024-02-29 LAB
ERYTHROCYTE [DISTWIDTH] IN BLOOD BY AUTOMATED COUNT: 13.9 % (ref 10–15)
HCT VFR BLD AUTO: 39.8 % (ref 35–47)
HGB BLD-MCNC: 13.2 G/DL (ref 11.7–15.7)
INR BLD: 1.7 (ref 0.9–1.1)
MCH RBC QN AUTO: 30.2 PG (ref 26.5–33)
MCHC RBC AUTO-ENTMCNC: 33.2 G/DL (ref 31.5–36.5)
MCV RBC AUTO: 91 FL (ref 78–100)
PLATELET # BLD AUTO: 133 10E3/UL (ref 150–450)
RBC # BLD AUTO: 4.37 10E6/UL (ref 3.8–5.2)
WBC # BLD AUTO: 4.4 10E3/UL (ref 4–11)

## 2024-02-29 PROCEDURE — 85027 COMPLETE CBC AUTOMATED: CPT

## 2024-02-29 PROCEDURE — 80048 BASIC METABOLIC PNL TOTAL CA: CPT

## 2024-02-29 PROCEDURE — 83735 ASSAY OF MAGNESIUM: CPT

## 2024-02-29 PROCEDURE — 36415 COLL VENOUS BLD VENIPUNCTURE: CPT

## 2024-02-29 PROCEDURE — 85610 PROTHROMBIN TIME: CPT

## 2024-02-29 NOTE — PROGRESS NOTES
This INR was done because its a standing order. She had other labs done today.   She is followed by , who will be seeing her on 3/5  Dottie Villalta RN

## 2024-03-01 LAB
ANION GAP SERPL CALCULATED.3IONS-SCNC: 10 MMOL/L (ref 7–15)
BUN SERPL-MCNC: 34.4 MG/DL (ref 8–23)
CALCIUM SERPL-MCNC: 9.5 MG/DL (ref 8.8–10.2)
CHLORIDE SERPL-SCNC: 95 MMOL/L (ref 98–107)
CREAT SERPL-MCNC: 1.29 MG/DL (ref 0.51–0.95)
DEPRECATED HCO3 PLAS-SCNC: 32 MMOL/L (ref 22–29)
EGFRCR SERPLBLD CKD-EPI 2021: 41 ML/MIN/1.73M2
GLUCOSE SERPL-MCNC: 132 MG/DL (ref 70–99)
MAGNESIUM SERPL-MCNC: 2 MG/DL (ref 1.7–2.3)
POTASSIUM SERPL-SCNC: 4.2 MMOL/L (ref 3.4–5.3)
SODIUM SERPL-SCNC: 137 MMOL/L (ref 135–145)

## 2024-03-01 NOTE — PROGRESS NOTES
Call to Benjamin with intake at [facility]. They will begin the intake process and call patient to schedule.

## 2024-03-01 NOTE — PROGRESS NOTES
Please let the patient know that [facility] with [facility] care services will not be able to start until 3/13.  If willing, referral for care coordination can be placed who can help with enrolling in C with another facility which can take the patient earlier.

## 2024-03-01 NOTE — TELEPHONE ENCOUNTER
Nurse Triage SBAR    Is this a 2nd Level Triage? NO    Situation: Dizziness.    Background: Per pt, she has dizziness, wants to know why her blood was darker in color today at lab appointment and if to take her nightly medications.    Assessment: Pt when asked about her dizziness, is irritated by nurse questions. Per pt, her dizziness is worse today but its only mild, she is currently in chair and relies on walker for getting up. Per pt, she had approximately 2 liters of fluids so far today. Pt denied any other complaints of distress.    Protocol Recommended Disposition:   See PCP Within 24 Hours Pt is informed, after multiple redirections for current complaints, that nurse can page on call MD as to if she should take her nightly medications or not, as RN can't tell pt to stop taking her medications, she can follow up with PCP about lab results and why blood was darker in color today at lab as lab results are not back yet at this time. Pt became frustrated with nurse triage questions and plan. pt declined paging on call provider regarding if to take nightly medications and verbalized she'll go to sleep. Pt is informed to call  FNA back for any concerns overnight, call PCP office in AM regarding lab results/darker blood color at lab and any  other questions she may have for PCP provider. Pt verbalized understanding and agreement.     Recommendation:           Does the patient meet one of the following criteria for ADS visit consideration? 16+ years old, with an [facility] PCP     TIP  Providers, please consider if this condition is appropriate for management at one of our Acute and Diagnostic Services sites.     If patient is a good candidate, please use dotphrase <dot>triageresponse and select Refer to ADS to document.    Reason for Disposition   [1] MODERATE dizziness (e.g., interferes with normal activities) AND [2] has NOT been evaluated by doctor (or NP/PA) for this  (Exception: Dizziness caused by heat exposure,  "sudden standing, or poor fluid intake.)    Additional Information   Negative: SEVERE difficulty breathing (e.g., struggling for each breath, speaks in single words)   Negative: [1] Difficulty breathing or swallowing AND [2] started suddenly after medicine, an allergic food or bee sting   Negative: Shock suspected (e.g., cold/pale/clammy skin, too weak to stand, low BP, rapid pulse)   Negative: Difficult to awaken or acting confused (e.g., disoriented, slurred speech)   Negative: [1] Weakness (i.e., paralysis, loss of muscle strength) of the face, arm or leg on one side of the body AND [2] sudden onset AND [3] present now   Negative: [1] Numbness (i.e., loss of sensation) of the face, arm or leg on one side of the body AND [2] sudden onset AND [3] present now   Negative: [1] Loss of speech or garbled speech AND [2] sudden onset AND [3] present now   Negative: Overdose (accidental or intentional) of medications   Negative: [1] Fainted > 15 minutes ago AND [2] still feels too weak or dizzy to stand   Negative: Sounds like a life-threatening emergency to the triager   Negative: Heart beating < 50 beats per minute OR > 140 beats per minute   Negative: Difficulty breathing   Negative: SEVERE dizziness (e.g., unable to stand, requires support to walk, feels like passing out now)   Negative: Extra heartbeats, irregular heart beating, or heart is beating very fast  (i.e., \"palpitations\")   Negative: [1] Drinking very little AND [2] dehydration suspected (e.g., no urine > 12 hours, very dry mouth, very lightheaded)   Negative: [1] Weakness (i.e., paralysis, loss of muscle strength) of the face, arm / hand, or leg / foot on one side of the body AND [2] sudden onset AND [3] brief (now gone)   Negative: [1] Numbness (i.e., loss of sensation) of the face, arm / hand, or leg / foot on one side of the body AND [2] sudden onset AND [3] brief (now gone)   Negative: [1] Loss of speech or garbled speech AND [2] sudden onset AND [3] " brief (now gone)   Negative: Loss of vision or double vision  (Exception: Similar to previous migraines.)   Negative: Patient sounds very sick or weak to the triager   Negative: [1] Dizziness caused by heat exposure, sudden standing, or poor fluid intake AND [2] no improvement after 2 hours of rest and fluids   Negative: [1] Fever > 103 F (39.4 C) AND [2] not able to get the fever down using Fever Care Advice   Negative: [1] Fever > 101 F (38.3 C) AND [2] age > 60 years   Negative: [1] Fever > 100.0 F (37.8 C) AND [2] bedridden (e.g., CVA, chronic illness, recovering from surgery)   Negative: [1] Fever > 100.0 F (37.8 C) AND [2] diabetes mellitus or weak immune system (e.g., HIV positive, cancer chemo, splenectomy, organ transplant, chronic steroids)    Protocols used: Dizziness - [id number]

## 2024-03-01 NOTE — PROGRESS NOTES
PT calls back. She states she is OK waiting until 3/13/24 for Home care PT.   As long as this is OK with Dr Payan.     DO we need to let [facility] know to schedule next available?

## 2024-03-04 ENCOUNTER — TELEPHONE (OUTPATIENT)
Dept: INTERNAL MEDICINE | Facility: CLINIC | Age: 84
End: 2024-03-04
Payer: COMMERCIAL

## 2024-03-04 ENCOUNTER — MEDICAL CORRESPONDENCE (OUTPATIENT)
Dept: HEALTH INFORMATION MANAGEMENT | Facility: CLINIC | Age: 84
End: 2024-03-04

## 2024-03-04 DIAGNOSIS — D69.6 THROMBOCYTOPENIA (H): Primary | ICD-10-CM

## 2024-03-05 ENCOUNTER — ANTICOAGULATION THERAPY VISIT (OUTPATIENT)
Dept: ANTICOAGULATION | Facility: CLINIC | Age: 84
End: 2024-03-05
Payer: COMMERCIAL

## 2024-03-05 ENCOUNTER — PATIENT OUTREACH (OUTPATIENT)
Dept: ONCOLOGY | Facility: CLINIC | Age: 84
End: 2024-03-05

## 2024-03-05 DIAGNOSIS — I48.21 PERMANENT ATRIAL FIBRILLATION (H): Primary | ICD-10-CM

## 2024-03-05 DIAGNOSIS — I48.20 CHRONIC ATRIAL FIBRILLATION (H): ICD-10-CM

## 2024-03-05 LAB — INR (EXTERNAL): 2 (ref 0.9–1.1)

## 2024-03-05 PROCEDURE — 82043 UR ALBUMIN QUANTITATIVE: CPT

## 2024-03-05 PROCEDURE — 82570 ASSAY OF URINE CREATININE: CPT

## 2024-03-05 NOTE — PROGRESS NOTES
ANTICOAGULATION MANAGEMENT     Savanna Rehman 83 year old female is on warfarin with therapeutic INR result. (Goal INR 2.0-3.0)    Recent labs: (last 7 days)     03/05/24  1259   INR 2.0*       ASSESSMENT     Source(s): Chart Review and Home Care/Facility Nurse     Warfarin doses taken: Booster dose(s) recently taken which may be affecting INR  Diet: No new diet changes identified  Medication/supplement changes: None noted  New illness, injury, or hospitalization: Yes: recently hospitalized 2/22-2/24 for CHF, was diuresed   Signs or symptoms of bleeding or clotting: yes. Bruising on hand and arm. Will monitor.   Previous result: Subtherapeutic  Additional findings: None       PLAN     Recommended plan for temporary change(s) affecting INR     Dosing Instructions: Continue your current warfarin dose with next INR in 1 week       Summary  As of 3/5/2024      Full warfarin instructions:  2.5 mg every Mon; 3.75 mg all other days   Next INR check:  3/12/2024               Telephone call with Raj home care nurse who agrees to plan and repeated back plan correctly    Orders given to  Homecare nurse/facility to recheck    Education provided:   Please call back if any changes to your diet, medications or how you've been taking warfarin    Plan made per [facility] anticoagulation protocol    Azul Whitaker, RN  Anticoagulation Clinic  3/5/2024    _______________________________________________________________________     Anticoagulation Episode Summary       Current INR goal:  2.0-3.0   TTR:  51.7% (1 y)   Target end date:  Indefinite   Send INR reminders to:  NIKKI CHASE    Indications    Permanent atrial fibrillation (H) [I48.21]  Chronic atrial fibrillation (H) [I48.20]  Long term (current) use of anticoagulants (Resolved) [Z79.01]             Comments:  Home care              Anticoagulation Care Providers       Provider Role Specialty Phone number    Patti Suárez MD Referring Internal Medicine  [phone]

## 2024-03-05 NOTE — PROGRESS NOTES
[facility]: Hematology                                                                Hem/Onc  Referral reviewed     ASSESSMENT      Clinical History (per Nurse review of records provided):    83 year old female patient with recent admit at [facility] for concern re: acute exacerbation of heart failure, on warfarin and referred for mildly low platelets:     PCP: Taylor Payan  Records Location: [facility]   Pertinent labs including smear 2/24/24 -- BOOKMARKED  Referring provider note(s)-- BOOKMARKED    INTERVENTION(S)                                                      -Referral Triage Scheduling Instructions added to Hem/Onc  referral for Patient Access rep ( number below, hours are Monday - Friday 8am - 4:30 pm) who will contact pt in the next 1-2 business days to schedule the consultation.    PLAN                                                      Hematology consult  Future Appointments   Date Time Provider Department Center   3/22/2024 10:30 AM Jayleen Goldstein [facility]   3/28/2024 10:45 AM [facility]   4/2/2024  1:00 PM Taylor Payan MD [facility]   4/5/2024 11:00 AM Carlos Youngblood MD [facility]   4/24/2024 10:30 AM Taylor Payan MD [facility]   7/17/2024 11:00 AM Sowmya Pollard APRN [facility]       See records team's Pre-Visit encounter documentation for additional records retrieval information.    Aislinn Carson, RN, BSN, OCN  Hematology/Oncology New Patient Nurse Navigator   [facility]  [phone]

## 2024-03-06 LAB
CREAT UR-MCNC: 59.3 MG/DL
MICROALBUMIN UR-MCNC: 25.1 MG/L
MICROALBUMIN/CREAT UR: 42.33 MG/G CR (ref 0–25)

## 2024-03-10 NOTE — TELEPHONE ENCOUNTER
RECORDS STATUS - ALL OTHER DIAGNOSIS      RECORDS RECEIVED FROM: Epic   DATE RECEIVED:    NOTES STATUS DETAILS   OFFICE NOTE from referring provider Epic 02/28/24: Dr. Taylor Payan   DISCHARGE SUMMARY from hospital [facility] 02/22/24: [facility]   MEDICATION LIST [facility]    LABS     PATHOLOGY REPORTS Report in EPIC 02/24/24: [id number]   ANYTHING RELATED TO DIAGNOSIS Epic Most recent 04/23/24

## 2024-03-12 ENCOUNTER — ANTICOAGULATION THERAPY VISIT (OUTPATIENT)
Dept: ANTICOAGULATION | Facility: CLINIC | Age: 84
End: 2024-03-12
Payer: COMMERCIAL

## 2024-03-12 ENCOUNTER — TELEPHONE (OUTPATIENT)
Dept: INTERNAL MEDICINE | Facility: CLINIC | Age: 84
End: 2024-03-12
Payer: COMMERCIAL

## 2024-03-12 ENCOUNTER — TELEPHONE (OUTPATIENT)
Dept: ONCOLOGY | Facility: HOSPITAL | Age: 84
End: 2024-03-12
Payer: COMMERCIAL

## 2024-03-12 DIAGNOSIS — I48.21 PERMANENT ATRIAL FIBRILLATION (H): Primary | ICD-10-CM

## 2024-03-12 DIAGNOSIS — I48.20 CHRONIC ATRIAL FIBRILLATION (H): ICD-10-CM

## 2024-03-12 LAB — INR (EXTERNAL): 1.7 (ref 0.9–1.1)

## 2024-03-12 NOTE — PROGRESS NOTES
ANTICOAGULATION MANAGEMENT     Savanna Rehman 83 year old female is on warfarin with subtherapeutic INR result. (Goal INR 2.0-3.0)    Recent labs: (last 7 days)     03/12/24  1229   INR 1.7*       ASSESSMENT     Source(s): Chart Review, Patient/Caregiver Call, and Home Care/Facility Nurse     Warfarin doses taken: Missed dose(s) may be affecting INR  Diet: No new diet changes identified  Medication/supplement changes: None noted  New illness, injury, or hospitalization: No  Signs or symptoms of bleeding or clotting: not currently but reports last time she took a booster dose she noticed she had more bruising and one in particular on her hand - educated on INR and bruising, thick vs thin blood and when to see evaluation   Previous result: Therapeutic last visit; previously outside of goal range  Additional findings: patient would like to know more about DOAC - advised to speak with PCP        PLAN     Recommended plan for temporary change(s) affecting INR     Dosing Instructions: booster dose then continue your current warfarin dose with next INR in 1 week       Summary  As of 3/12/2024      Full warfarin instructions:  3/12: 6.25 mg; Otherwise 2.5 mg every Mon; 3.75 mg all other days   Next INR check:  3/19/2024               Telephone call with Raj home care nurse who verbalizes understanding and agrees to plan and who agrees to plan and repeated back plan correctly    Orders given to  Homecare nurse/facility to recheck    Education provided:   Taking warfarin: Importance of taking warfarin as instructed  Symptom monitoring: when to seek medical attention/emergency care  Contact [phone]  with any changes, questions or concerns.     Plan made per [facility] anticoagulation protocol    Sanjana Fox RN  Anticoagulation Clinic  3/12/2024    _______________________________________________________________________     Anticoagulation Episode Summary       Current INR goal:  2.0-3.0   TTR:  50.7% (1 y)   Target end  date:  Indefinite   Send INR reminders to:  NIKKI CHASE    Indications    Permanent atrial fibrillation (H) [I48.21]  Chronic atrial fibrillation (H) [I48.20]  Long term (current) use of anticoagulants (Resolved) [Z79.01]             Comments:  Home care              Anticoagulation Care Providers       Provider Role Specialty Phone number    Patti Suárez MD Referring Internal Medicine [phone]

## 2024-03-12 NOTE — TELEPHONE ENCOUNTER
Called pt to r/s consult with Dr. Youngblood on 04/05/24, due to provider out of clinic. Pt stated she did not have her calendar at this time, and requested a call back another day. I informed pt we would call back another day.

## 2024-03-14 ENCOUNTER — MEDICAL CORRESPONDENCE (OUTPATIENT)
Dept: HEALTH INFORMATION MANAGEMENT | Facility: CLINIC | Age: 84
End: 2024-03-14

## 2024-03-18 ENCOUNTER — TELEPHONE (OUTPATIENT)
Dept: INTERNAL MEDICINE | Facility: CLINIC | Age: 84
End: 2024-03-18
Payer: COMMERCIAL

## 2024-03-18 NOTE — TELEPHONE ENCOUNTER
[facility] physical therapist calls for verbal order      Start of care was to begin today. Patient declined her visit due to diarrhea. Physical therapy will call to see patient 3- for physical therapy start of care.      Latia can be reached at [phone]

## 2024-03-19 ENCOUNTER — TELEPHONE (OUTPATIENT)
Dept: ANTICOAGULATION | Facility: CLINIC | Age: 84
End: 2024-03-19
Payer: COMMERCIAL

## 2024-03-19 DIAGNOSIS — I48.21 PERMANENT ATRIAL FIBRILLATION (H): Primary | ICD-10-CM

## 2024-03-19 DIAGNOSIS — I48.20 CHRONIC ATRIAL FIBRILLATION (H): ICD-10-CM

## 2024-03-19 NOTE — TELEPHONE ENCOUNTER
Voice mail message received from home care nurse Raj, informing [facility] the patient cancelled their appointment today because she was not feeling well.  Home care is looking for dosing instructions.    Returned call to home care nurse and advised to continue taking warfarin 2.5 mg every Mon; 3.75 mg all other days.  Recheck INR in one week, however, if home care is able to see patient before 3/26/24 verbal orders given ok to check INR early.    Raj stated understanding and was agreeable with plan.    JAMI JohnsonN, RN  Anticoagulation Clinic

## 2024-03-20 ENCOUNTER — TELEPHONE (OUTPATIENT)
Dept: INTERNAL MEDICINE | Facility: CLINIC | Age: 84
End: 2024-03-20
Payer: COMMERCIAL

## 2024-03-20 NOTE — TELEPHONE ENCOUNTER
Latia calling with [facility] physical therapy for verbal order    Delay start of care for physical therapy to 3/26/2024 due to patient stating she has too many appointments already on 3/22/2024.    OK to leave detailed message on secure line [phone]

## 2024-03-22 ENCOUNTER — TELEPHONE (OUTPATIENT)
Dept: INTERNAL MEDICINE | Facility: CLINIC | Age: 84
End: 2024-03-22

## 2024-03-22 ENCOUNTER — VIRTUAL VISIT (OUTPATIENT)
Dept: INTERNAL MEDICINE | Facility: CLINIC | Age: 84
End: 2024-03-22
Payer: COMMERCIAL

## 2024-03-22 DIAGNOSIS — R10.84 ABDOMINAL PAIN, GENERALIZED: ICD-10-CM

## 2024-03-22 DIAGNOSIS — R30.0 DYSURIA: Primary | ICD-10-CM

## 2024-03-22 PROCEDURE — 82043 UR ALBUMIN QUANTITATIVE: CPT

## 2024-03-22 PROCEDURE — 99215 OFFICE O/P EST HI 40 MIN: CPT | Mod: 95 | Performed by: INTERNAL MEDICINE

## 2024-03-22 PROCEDURE — 82570 ASSAY OF URINE CREATININE: CPT

## 2024-03-22 NOTE — PROGRESS NOTES
Savanna is a 83 year old who is being evaluated via a billable video visit.    How would you like to obtain your AVS? MyChart  If the video visit is dropped, the invitation should be resent by: Text to cell phone: [phone]  Will anyone else be joining your video visit? No      Assessment & Plan     Dysuria    - UA with Microscopic reflex to Culture - lab collect; Future    Abdominal pain, generalized    UA ordered prior to the visit.  Results not available.  Multiple concerns with abdominal pain, dehydration and dysuria as well as generalized weakness discussed.  Discussed with the patient on follow-up in the emergency room for further evaluation.        41 minutes spent by me on the date of the encounter doing chart review, history and exam, documentation and further activities per the note            Subjective   Savanna is a 83 year old, presenting for the following health issues:  Generalized Body Aches        3/22/2024    11:40 AM   Additional Questions   Roomed by Nydiai   Accompanied by Self         HPI       Multiple attempts to connect via video.  The visit was eventually discussed over the phone.  Symptoms of abdominal pain and dysuria.  Has been having the symptoms for the past 10+ days.  Has tried to increase hydration.  Has felt some improvement in symptoms with increasing hydration.  UA ordered prior to the visit but results not available for discussion.  However noted that patient has multiple allergies to antibiotics and patient prefers that the infectious disease team order antibiotics for UTI.  We had quite a varied discussion during the telephone visit today.  Patient talked about a party that the patient is hosting next weekend and has made all the preps as well as ordered the food.  Patient requesting for antibiotic for the possible UTI and as well patient reports the comfort of having the antibiotic come from the ID team due to the allergies to multiple antibiotics.  Patient then talks about  transport concerns into the emergency room.      Review of Systems  Constitutional, HEENT, cardiovascular, pulmonary, gi and gu systems are negative, except as otherwise noted.      Objective           Vitals:  No vitals were obtained today due to virtual visit.    Physical Exam   GENERAL: alert and no distress  RESP: No audible wheeze/cough,    NEURO:  Mentation and speech appropriate for age.  PSYCH: Appropriate affect, tone, and pace of words          Video-Visit Details    Type of service:  Video Visit   Originating Location (pt. Location): Home    Distant Location (provider location):  On-site  Platform used for Video Visit: Gila  Signed Electronically by: Taylor Payan MD

## 2024-03-22 NOTE — TELEPHONE ENCOUNTER
Please schedule patient for video virtual today(open slot available) to further discuss symptoms.

## 2024-03-22 NOTE — TELEPHONE ENCOUNTER
"Patient was called and a tele call appt was made for 3- at 430. Can an order be placed for urine lab now? Patient says her daughter can drop off a urine specimen this afternoon around 1:30 in [facility] lab.     Patient c/o belly pain from \"hip bone to hip bone\". When asked what her pain level is from 1 to 10, 10 being the worst, she reports it is 8. Patient went on to say that in the past 10+ days she has not been able to eat as it makes her vomit and also caused dry heaves.   Patient added that this was due to being dehydrated and now that she has begun to drink water and is hydrated, she can eat rice and bone broth and keep it down.  Patient stated she did not know the importance of drinking water.     Patient can be reached at [phone]  "

## 2024-03-22 NOTE — TELEPHONE ENCOUNTER
Spoke with the patient and she is going to have a friend drop off clean urine sample at the [place] lab prior to her appointment with Dr Payan    Patient would like Dr Payan to call her at [phone] at 330pm today for their visit as she has trouble doing the virtual appointment connection.

## 2024-03-22 NOTE — TELEPHONE ENCOUNTER
S-(situation): Patient requesting PCP to place UA orders for possible bladder infection     B-(background): patient has history of bladder infection    A-(assessment): Writer recommended patient complete an evisit or VV- patient declined, she just wants labs placed so she can send urine in. Patient would like an answer today.     R-(recommendations): Routing to PCP for review.     Genoveva HORTON

## 2024-03-23 ENCOUNTER — HOSPITAL ENCOUNTER (EMERGENCY)
Facility: CLINIC | Age: 84
Discharge: HOME | End: 2024-03-23
Attending: EMERGENCY MEDICINE | Admitting: EMERGENCY MEDICINE
Payer: COMMERCIAL

## 2024-03-23 ENCOUNTER — NURSE TRIAGE (OUTPATIENT)
Dept: NURSING | Facility: CLINIC | Age: 84
End: 2024-03-23
Payer: COMMERCIAL

## 2024-03-23 VITALS
TEMPERATURE: 97.9 F | DIASTOLIC BLOOD PRESSURE: 74 MMHG | HEART RATE: 69 BPM | OXYGEN SATURATION: 99 % | RESPIRATION RATE: 18 BRPM | SYSTOLIC BLOOD PRESSURE: 154 MMHG

## 2024-03-23 DIAGNOSIS — N30.00 ACUTE CYSTITIS WITHOUT HEMATURIA: ICD-10-CM

## 2024-03-23 DIAGNOSIS — R30.0 DYSURIA: Primary | ICD-10-CM

## 2024-03-23 DIAGNOSIS — R10.30 LOWER ABDOMINAL PAIN: ICD-10-CM

## 2024-03-23 LAB
ALBUMIN UR-MCNC: NEGATIVE MG/DL
ANION GAP SERPL CALCULATED.3IONS-SCNC: 11 MMOL/L (ref 7–15)
APPEARANCE UR: CLEAR
BACTERIA #/AREA URNS HPF: ABNORMAL /HPF
BASOPHILS # BLD AUTO: 0.1 10E3/UL (ref 0–0.2)
BASOPHILS NFR BLD AUTO: 1 %
BILIRUB UR QL STRIP: NEGATIVE
BUN SERPL-MCNC: 19.2 MG/DL (ref 8–23)
CALCIUM SERPL-MCNC: 9.3 MG/DL (ref 8.8–10.2)
CHLORIDE SERPL-SCNC: 94 MMOL/L (ref 98–107)
COLOR UR AUTO: ABNORMAL
CREAT SERPL-MCNC: 1.31 MG/DL (ref 0.51–0.95)
CREAT UR-MCNC: 92.8 MG/DL
DEPRECATED HCO3 PLAS-SCNC: 29 MMOL/L (ref 22–29)
EGFRCR SERPLBLD CKD-EPI 2021: 41 ML/MIN/1.73M2
EOSINOPHIL # BLD AUTO: 0.3 10E3/UL (ref 0–0.7)
EOSINOPHIL NFR BLD AUTO: 6 %
ERYTHROCYTE [DISTWIDTH] IN BLOOD BY AUTOMATED COUNT: 13.7 % (ref 10–15)
GLUCOSE SERPL-MCNC: 120 MG/DL (ref 70–99)
GLUCOSE UR STRIP-MCNC: NEGATIVE MG/DL
HCT VFR BLD AUTO: 39.5 % (ref 35–47)
HGB BLD-MCNC: 13.4 G/DL (ref 11.7–15.7)
HGB UR QL STRIP: NEGATIVE
HOLD SPECIMEN: NORMAL
HYALINE CASTS: 1 /LPF
IMM GRANULOCYTES # BLD: 0 10E3/UL
IMM GRANULOCYTES NFR BLD: 0 %
KETONES UR STRIP-MCNC: NEGATIVE MG/DL
LEUKOCYTE ESTERASE UR QL STRIP: ABNORMAL
LYMPHOCYTES # BLD AUTO: 1.5 10E3/UL (ref 0.8–5.3)
LYMPHOCYTES NFR BLD AUTO: 34 %
MAGNESIUM SERPL-MCNC: 1.8 MG/DL (ref 1.7–2.3)
MCH RBC QN AUTO: 30.7 PG (ref 26.5–33)
MCHC RBC AUTO-ENTMCNC: 33.9 G/DL (ref 31.5–36.5)
MCV RBC AUTO: 90 FL (ref 78–100)
MICROALBUMIN UR-MCNC: 30.2 MG/L
MICROALBUMIN/CREAT UR: 32.54 MG/G CR (ref 0–25)
MONOCYTES # BLD AUTO: 0.6 10E3/UL (ref 0–1.3)
MONOCYTES NFR BLD AUTO: 13 %
NEUTROPHILS # BLD AUTO: 2.1 10E3/UL (ref 1.6–8.3)
NEUTROPHILS NFR BLD AUTO: 46 %
NITRATE UR QL: NEGATIVE
NRBC # BLD AUTO: 0 10E3/UL
NRBC BLD AUTO-RTO: 0 /100
PH UR STRIP: 5 [PH] (ref 5–7)
PLATELET # BLD AUTO: 173 10E3/UL (ref 150–450)
POTASSIUM SERPL-SCNC: 4 MMOL/L (ref 3.4–5.3)
RBC # BLD AUTO: 4.37 10E6/UL (ref 3.8–5.2)
RBC URINE: 1 /HPF
SODIUM SERPL-SCNC: 134 MMOL/L (ref 135–145)
SP GR UR STRIP: 1.01 (ref 1–1.03)
SQUAMOUS EPITHELIAL: <1 /HPF
UROBILINOGEN UR STRIP-MCNC: NORMAL MG/DL
WBC # BLD AUTO: 4.6 10E3/UL (ref 4–11)
WBC URINE: 17 /HPF

## 2024-03-23 PROCEDURE — 87086 URINE CULTURE/COLONY COUNT: CPT | Performed by: EMERGENCY MEDICINE

## 2024-03-23 PROCEDURE — 81001 URINALYSIS AUTO W/SCOPE: CPT | Performed by: EMERGENCY MEDICINE

## 2024-03-23 PROCEDURE — 36415 COLL VENOUS BLD VENIPUNCTURE: CPT | Performed by: EMERGENCY MEDICINE

## 2024-03-23 PROCEDURE — 80048 BASIC METABOLIC PNL TOTAL CA: CPT | Performed by: EMERGENCY MEDICINE

## 2024-03-23 PROCEDURE — 87186 SC STD MICRODIL/AGAR DIL: CPT | Performed by: EMERGENCY MEDICINE

## 2024-03-23 PROCEDURE — 99283 EMERGENCY DEPT VISIT LOW MDM: CPT

## 2024-03-23 PROCEDURE — 83735 ASSAY OF MAGNESIUM: CPT | Performed by: EMERGENCY MEDICINE

## 2024-03-23 PROCEDURE — 85025 COMPLETE CBC W/AUTO DIFF WBC: CPT | Performed by: EMERGENCY MEDICINE

## 2024-03-23 RX ORDER — CEPHALEXIN 500 MG/1
500 CAPSULE ORAL 2 TIMES DAILY
Qty: 14 CAPSULE | Refills: 0 | Status: SHIPPED | OUTPATIENT
Start: 2024-03-23 | End: 2024-03-30

## 2024-03-23 ASSESSMENT — COLUMBIA-SUICIDE SEVERITY RATING SCALE - C-SSRS
6. HAVE YOU EVER DONE ANYTHING, STARTED TO DO ANYTHING, OR PREPARED TO DO ANYTHING TO END YOUR LIFE?: NO
2. HAVE YOU ACTUALLY HAD ANY THOUGHTS OF KILLING YOURSELF IN THE PAST MONTH?: NO
1. IN THE PAST MONTH, HAVE YOU WISHED YOU WERE DEAD OR WISHED YOU COULD GO TO SLEEP AND NOT WAKE UP?: NO

## 2024-03-23 ASSESSMENT — ACTIVITIES OF DAILY LIVING (ADL)
ADLS_ACUITY_SCORE: 38
ADLS_ACUITY_SCORE: 38

## 2024-03-23 NOTE — ED PROVIDER NOTES
"  History     Chief Complaint:  Abdominal Pain       HPI   Savanna Rehman is a 83 year old female who presents to the ED due to abdominal pain.  The patient notes that she has had lower abdominal pain for the last 1.5 weeks.  The patient states that she has urinary incontinence at baseline but had noted a \"patel\" color on her pad on Thursday and she states that after consultation with her clinic they were concerned that she could be dehydrated so she was encouraged to come be evaluated.  The patient stated that on Friday she went to her clinic to leave a urine sample but they were unable to run it before the lab staff went home at the end of the day.  The patient denies fever.  She states that since being told she might be dehydrated she has been making an effort to drink more water.  She drank 7 or 8 bottles of water on Thursday and roughly equivalent volume yesterday as well.  There has been no vomiting or diarrhea.    The patient describes bilateral lower abdominal discomfort and states that it has not changed in the last week and a half but has not gotten better either.      Independent Historian:    None - Patient Only    Review of External Notes:  Virtual visit 3/22/24: The note is incomplete, so I am unable to see any data.    Allergies:  Augmented Betamethasone Diprop [Betamethasone]  Petroleum Jelly [Petrolatum]  Shellfish-Derived Products  Aspirin  Bacitracin  Bactrim [Sulfamethoxazole-Trimethoprim]  Coumadin [Warfarin]  Darvon [Propoxyphene]  Dilaudid [Hydromorphone]  Levaquin [Levofloxacin]  Lidocaine  Morphine  Neomycin  Neosporin [Neomycin-Polymyxin-Gramicidin]  Nitrofurantoin  Oxycodone  Percocet [Oxycodone-Acetaminophen]  Percodan [Oxycodone-Aspirin]  Polymyxin B  Pramoxine  Tramadol  Vicodin [Hydrocodone-Acetaminophen]  Xarelto [Rivaroxaban]  Adhesive Tape  Codeine  Hydrocortisone  Other Environmental Allergy     Physical Exam   Patient Vitals for the past 24 hrs:   BP Temp Temp src Pulse Resp " SpO2   03/23/24 1730 (!) 154/74 -- -- 69 18 99 %   03/23/24 1522 (!) 163/78 97.9  F (36.6  C) Temporal 71 16 95 %        Physical Exam  Constitutional: Vital signs reviewed as above.   Eyes: PEERL, EOMI B/L  Neck: No JVD noted. FROM   Cardiovascular: normal rate, Regular rhythm and normal heart sounds.  No murmur heard. Equal B/L peripheral pulses.  Pulmonary/Chest: Effort normal and breath sounds normal. No respiratory distress. Patient has no wheezes. Patient has no rales.   Gastrointestinal: Soft. There is no tenderness.   Musculoskeletal/Extremities: No pitting edema noted. Normal tone.  Neurological: Alert  Skin: Skin is warm and dry. There is no diaphoresis noted.   Psychiatric: The patient appears calm.     Emergency Department Course       Laboratory: Imaging:   Labs Ordered and Resulted from Time of ED Arrival to Time of ED Departure   ROUTINE UA WITH MICROSCOPIC REFLEX TO CULTURE - Abnormal       Result Value    Color Urine Light Yellow      Appearance Urine Clear      Glucose Urine Negative      Bilirubin Urine Negative      Ketones Urine Negative      Specific Gravity Urine 1.006      Blood Urine Negative      pH Urine 5.0      Protein Albumin Urine Negative      Urobilinogen Urine Normal      Nitrite Urine Negative      Leukocyte Esterase Urine Moderate (*)     Bacteria Urine Few (*)     RBC Urine 1      WBC Urine 17 (*)     Squamous Epithelials Urine <1      Hyaline Casts Urine 1     BASIC METABOLIC PANEL - Abnormal    Sodium 134 (*)     Potassium 4.0      Chloride 94 (*)     Carbon Dioxide (CO2) 29      Anion Gap 11      Urea Nitrogen 19.2      Creatinine 1.31 (*)     GFR Estimate 41 (*)     Calcium 9.3      Glucose 120 (*)    MAGNESIUM - Normal    Magnesium 1.8     CBC WITH PLATELETS AND DIFFERENTIAL    WBC Count 4.6      RBC Count 4.37      Hemoglobin 13.4      Hematocrit 39.5      MCV 90      MCH 30.7      MCHC 33.9      RDW 13.7      Platelet Count 173      % Neutrophils 46      % Lymphocytes 34       % Monocytes 13      % Eosinophils 6      % Basophils 1      % Immature Granulocytes 0      NRBCs per 100 WBC 0      Absolute Neutrophils 2.1      Absolute Lymphocytes 1.5      Absolute Monocytes 0.6      Absolute Eosinophils 0.3      Absolute Basophils 0.1      Absolute Immature Granulocytes 0.0      Absolute NRBCs 0.0     URINE CULTURE     No orders to display           Procedures       Emergency Department Course & Assessments:    Interventions:  Medications - No data to display     Assessments, Independent Interpretation, Consult/Discussion of ManagementTests:  ED Course as of 03/23/24 1807   Sat Mar 23, 2024   1546 Initial evaluation.       Social Determinants of Health affecting care:  None    Disposition:  See ED Course and MDM    Impression & Plan    CMS Diagnoses: None    Code Status: Prior    MIPS (If applicable):  N/A    Medical Decision Making:  This 83-year-old female patient presents to the ED due to lower abdominal discomfort and concern for UTI.  Please see the HPI and exam for specifics.  A broad differential was considered including urinary infection.  The above workup was undertaken.  The patient does have pyuria and moderate leukocyte esterase.  She notes suprapubic pressure.  Renal function seems stable.  There has been no fever nor leukocytosis.  The patient did not seem to have severe abdominal pain and I did not think that advanced imaging was needed.  The patient and I discussed treatment options and we will elect to prescribe antibiotics as we await urine culture.  Previous urine cultures have noted mixed organisms but is a urine culture from February 7, 2023 that demonstrated E. coli with pan sensitivity.  Given the patient's extensive allergy profile I will elect to prescribe Keflex.  The patient to follow closely in the outpatient setting and certainly return with new or worsening symptoms.      Critical Care:  None.    Diagnosis:    ICD-10-CM    1. Acute cystitis without hematuria   N30.00       2. Lower abdominal pain  R10.30            Discharge Medications:  Discharge Medication List as of 3/23/2024  5:26 PM        START taking these medications    Details   cephALEXin (KEFLEX) 500 MG capsule Take 1 capsule (500 mg) by mouth 2 times daily for 7 days, Disp-14 capsule, R-0, E-Prescribe                3/23/2024   Peter Tello, Peter Diez DO  03/23/24 0178

## 2024-03-23 NOTE — DISCHARGE INSTRUCTIONS
What do you do next:   Continue your home medications unless we have specifically changed them  If medications were prescribed today, take these as directed.  You can use over-the-counter acetaminophen (Tylenol ) and ibuprofen for fever or pain control as applicable to your visit today.  Acetaminophen (Tylenol): Take 500 to 1000 mg by mouth every 6 hours as needed for fever or pain.  Do not take more than 4000 total milligrams of acetaminophen-containing products in a 24-hour timeframe.  Follow up as indicated below    When do you return: Review your discharge papers for specifics on reasons to return.    Thank you for allowing us to care for you today.

## 2024-03-23 NOTE — TELEPHONE ENCOUNTER
Patient calling back. See triage notes below.    Reviewed notes below with patient. Reviewed both [place] and [facility] hours.    Patient stating she did not plan on dropping the sample today as she did not want to bother her daughter to bring another sample in. Patient stating they only one who is supposed to prescribe for her urinary tract infections is her infectious disease provider due to medical history and allergies.    Patient stating she is aware and told her Dr she knows she is going against medical advice.      See Virtual Visit from 3/22/24.    Encouraged patient to drop urine sample as advised below at . Patient agrees to contact on call for her infectious disease provider to advise on prescription.    Chela Salazar RN  [facility]

## 2024-03-23 NOTE — TELEPHONE ENCOUNTER
Nurse Triage SBAR    Is this a 2nd Level Triage? No    Situation/Background: Patient is calling regarding UA results, ordered 3/22/24. PCP ordered a UA/UC, patient had virtual visit with PCP yesterday. Patient states her daughter delivered the urine sample to [place]. Review of EMR indicates that the urine sample needs to be collected. Patient states her daughter does have sterile sample cups which she was given at the clinic yesterday.     Assessment:   Patient declines triage of symptoms today    Recommendation: per disposition, Information provided. Patient declines triage of symptoms today. This writer called and spoke with Ilana at [facility] lab. This writer was advised that the patient will need to drop off another sample as the urine sample yesterday was processed for urine microalbumin. The lab is closed and the sample will be too old to process on Monday. Reviewed Care Advice with patient and verbalizes understanding. Declines additional questions. Advised patient to call back with any new or worsening symptoms. Patient verbalized understanding and agrees with plan.     This writer called patient X 3, no answer, left message on voice mail to call back to [facility] Triage at [phone]. May speak with any available RN. Advise patient that another urine sample will need to be dropped off at [facility] or [facility] during UC hours, no appointment needed.     Patient number is [phone], call back if busy    Protocol Recommended Disposition: Telephone advice    Maira Madrigal, CLIFFORD on 3/23/2024 at 10:38 AM  [facility]  Reason for Disposition   Caller requesting routine or non-urgent lab result    Protocols used: [facility] Call - [facility]

## 2024-03-23 NOTE — ED TRIAGE NOTES
Lower abdominal pain for the past 1.5 weeks. Patient is concerned that she has UTI but has no pain or urinary frequency. Submitted UA yesterday, but has not results.

## 2024-03-24 ENCOUNTER — NURSE TRIAGE (OUTPATIENT)
Dept: NURSING | Facility: CLINIC | Age: 84
End: 2024-03-24
Payer: COMMERCIAL

## 2024-03-24 NOTE — TELEPHONE ENCOUNTER
Savanna calling to say she took FNA RN's advice and went to the ED. Presently she is on ABT and calling to say thank you. I agreed to pass on message. Declined need for triage.  Lupe Munguia RN on 3/24/2024 at 12:56 PM        Reason for Disposition   Caller has question and triager able to answer question    Protocols used: Infection on Antibiotic Follow-up Call-A-

## 2024-03-25 ENCOUNTER — DOCUMENTATION ONLY (OUTPATIENT)
Dept: ANTICOAGULATION | Facility: CLINIC | Age: 84
End: 2024-03-25
Payer: COMMERCIAL

## 2024-03-25 DIAGNOSIS — I48.21 PERMANENT ATRIAL FIBRILLATION (H): Primary | ICD-10-CM

## 2024-03-25 DIAGNOSIS — I48.20 CHRONIC ATRIAL FIBRILLATION (H): ICD-10-CM

## 2024-03-25 LAB
BACTERIA UR CULT: ABNORMAL
BACTERIA UR CULT: ABNORMAL

## 2024-03-25 NOTE — RESULT ENCOUNTER NOTE
[facility] Emergency Dept discharge antibiotic (if prescribed): Cephalexin (Keflex) 500 mg capsule, 1 capsule (500 mg) by mouth 2 times daily for 7 days.   Date of Rx (if applicable):  3/23/24  Recommendations in treatment per [facility] ED Lab result Urine culture protocol.

## 2024-03-25 NOTE — PROGRESS NOTES
"ANTICOAGULATION  MANAGEMENT: Discharge Review    Savanna Rehman chart reviewed for anticoagulation continuity of care    Emergency room visit on 3/23/24 for acute cystitis without hematuria.    Discharge disposition: Home    Results:    No results for input(s): \"INR\", \"[id number]\", \"F2\", \"ALMWH\", \"AAUFH\" in the last 168 hours.  Anticoagulation inpatient management:     not applicable     Anticoagulation discharge instructions:     Warfarin dosing: home regimen continued   Bridging: No   INR goal change: No      Medication changes affecting anticoagulation: Yes: Keflex 3/23/24-3/30/24 which can increase INR    Additional factors affecting anticoagulation: No     PLAN     Recommend to check INR on 3/25/24    Spoke with home care nurse Evelin who agrees to plan    Anticoagulation Calendar updated    Dorothy River RN    "

## 2024-03-26 ENCOUNTER — ANTICOAGULATION THERAPY VISIT (OUTPATIENT)
Dept: ANTICOAGULATION | Facility: CLINIC | Age: 84
End: 2024-03-26
Payer: COMMERCIAL

## 2024-03-26 ENCOUNTER — MEDICAL CORRESPONDENCE (OUTPATIENT)
Dept: HEALTH INFORMATION MANAGEMENT | Facility: CLINIC | Age: 84
End: 2024-03-26
Payer: COMMERCIAL

## 2024-03-26 ENCOUNTER — TELEPHONE (OUTPATIENT)
Dept: INTERNAL MEDICINE | Facility: CLINIC | Age: 84
End: 2024-03-26
Payer: COMMERCIAL

## 2024-03-26 ENCOUNTER — TELEPHONE (OUTPATIENT)
Dept: EMERGENCY MEDICINE | Facility: CLINIC | Age: 84
End: 2024-03-26
Payer: COMMERCIAL

## 2024-03-26 DIAGNOSIS — E11.42 DIABETIC POLYNEUROPATHY ASSOCIATED WITH TYPE 2 DIABETES MELLITUS (H): ICD-10-CM

## 2024-03-26 DIAGNOSIS — I48.21 PERMANENT ATRIAL FIBRILLATION (H): Primary | ICD-10-CM

## 2024-03-26 DIAGNOSIS — R19.7 DIARRHEA, UNSPECIFIED TYPE: ICD-10-CM

## 2024-03-26 DIAGNOSIS — I48.20 CHRONIC ATRIAL FIBRILLATION (H): ICD-10-CM

## 2024-03-26 DIAGNOSIS — F33.1 MODERATE EPISODE OF RECURRENT MAJOR DEPRESSIVE DISORDER (H): Primary | ICD-10-CM

## 2024-03-26 LAB — INR (EXTERNAL): 2.5 (ref 0.9–1.1)

## 2024-03-26 RX ORDER — ESCITALOPRAM OXALATE 5 MG/1
5 TABLET ORAL DAILY
Qty: 30 TABLET | Refills: 0 | Status: SHIPPED | OUTPATIENT
Start: 2024-03-26 | End: 2024-04-19

## 2024-03-26 RX ORDER — GLIPIZIDE 2.5 MG/1
2.5 TABLET, EXTENDED RELEASE ORAL 2 TIMES DAILY
Qty: 180 TABLET | Refills: 0 | Status: SHIPPED | OUTPATIENT
Start: 2024-03-26 | End: 2024-07-16

## 2024-03-26 RX ORDER — LOPERAMIDE HCL 2 MG
CAPSULE ORAL
Qty: 60 CAPSULE | Refills: 0 | Status: SHIPPED | OUTPATIENT
Start: 2024-03-26

## 2024-03-26 NOTE — PROGRESS NOTES
ANTICOAGULATION MANAGEMENT     Savanna Rehman 83 year old female is on warfarin with therapeutic INR result. (Goal INR 2.0-3.0)    Recent labs: (last 7 days)     03/26/24  1306   INR 2.5*       ASSESSMENT     Source(s): Chart Review and Home Care/Facility Nurse     Warfarin doses taken: Warfarin taken as instructed  Diet: No new diet changes identified  Medication/supplement changes:  Keflex 3/23-3/30 for cystitis with out hematuria    New illness, injury, or hospitalization: Yes: ED visit on 3/23  Signs or symptoms of bleeding or clotting: No  Previous result: Subtherapeutic  Additional findings:  per patients history on Keflex, does not affect INR - home care nurse unable to see patient until 4/2        PLAN     Recommended plan for temporary change(s) affecting INR     Dosing Instructions: Continue your current warfarin dose with next INR in 1 week       Summary  As of 3/26/2024      Full warfarin instructions:  2.5 mg every Mon; 3.75 mg all other days   Next INR check:  4/2/2024               Telephone call with Evelin home care nurse who verbalizes understanding and agrees to plan and who agrees to plan and repeated back plan correctly    Orders given to  Homecare nurse/facility to recheck    Education provided:   Interaction IS NOT anticipated between warfarin and keflex per patient history   Contact [phone]  with any changes, questions or concerns.     Plan made per [facility] anticoagulation protocol    Sanjana Fox RN  Anticoagulation Clinic  3/26/2024    _______________________________________________________________________     Anticoagulation Episode Summary       Current INR goal:  2.0-3.0   TTR:  53.1% (1 y)   Target end date:  Indefinite   Send INR reminders to:  NIKKI CHASE    Indications    Permanent atrial fibrillation (H) [I48.21]  Chronic atrial fibrillation (H) [I48.20]  Long term (current) use of anticoagulants (Resolved) [Z79.01]             Comments:  Home care               Anticoagulation Care Providers       Provider Role Specialty Phone number    Patti Suárez MD Referring Internal Medicine [phone]

## 2024-03-26 NOTE — TELEPHONE ENCOUNTER
[facility]    Reason for call: Lab Result Notification     Lab Result (including Rx patient on, if applicable).  If culture, copy of lab report at bottom.  Lab Result: Final Urine Culture Report on 3/25/24  Emergency Dep/Urgent Care discharge antibiotic prescribed: Cephalexin (Keflex) 500 mg capsule, 1 capsule (500 mg) by mouth 2 times daily for 7 days.   Date of Rx (if applicable):  3/23/24     #1. Bacteria, >100,000 CFU/ML Klebsiella pneumoniae  is SUSCEPTIBLE to Antibiotic.    #2. Bacteria, 50,000 - 100,000 CFU/ML Klebsiella pneumoniae  is SUSCEPTIBLE to Antibiotic.    No change in treatment per [facility] lab result Urine Culture protocol.    Creatinine Level (mg/dl)   Creatinine   Date Value Ref Range Status   03/23/2024 1.31 (H) 0.51 - 0.95 mg/dL Final   02/22/2021 0.97 0.52 - 1.04 mg/dL Final    Creatinine clearance (ml/min), if applicable    Serum creatinine: 1.31 mg/dL (H) 03/23/24 1545  Estimated creatinine clearance: 43.3 mL/min (A)     Patient's current Symptoms:   Pt has been taking ABX prescribed and notes improvement. Relayed result and recommendations     RN Recommendations/Instructions per [facility] lab result protocol:   [facility] ED lab result protocol utilized: Urine Cx    Patient/care giver notified to contact your PCP clinic or return to the Emergency department if your:  Symptoms return.  Symptoms do not improve after 3 days on antibiotic.  Symptoms do not resolve after completing antibiotic.  Symptoms worsen or other concerning symptoms.    Davonte Fernández RN

## 2024-03-26 NOTE — TELEPHONE ENCOUNTER
Latia with [facility] [phone]  PHYSICAL THERAPY 1x per week for 4 weeks  1x every other week for 4 weeks    Ok to leave a detailed voicemail as this is a secure line

## 2024-03-27 ENCOUNTER — TELEPHONE (OUTPATIENT)
Dept: INTERNAL MEDICINE | Facility: CLINIC | Age: 84
End: 2024-03-27
Payer: COMMERCIAL

## 2024-03-27 NOTE — TELEPHONE ENCOUNTER
[facility] PT - calling     Stating there is a drug interaction with the warfarin and lexapro - increases the risk of bleeding.     Please advise     Best # [phone] ok LM       Thank you   Marce Chawla RN, BSN  [facility]

## 2024-03-28 NOTE — TELEPHONE ENCOUNTER
SOC orders for Savanna Rehman? Dr. Payan  FOCUS OF CARE: CHF  DISCIPLINE: PT  SPECIFIC TREATMENT ORDERS: (WOUND/IV - IF NOT CONTAINED IN REFERRAL ORDER) PT POC 1W4, 2 VISITS OVER 4 WKS FOR STRENGTH, BALANCE, TRANSFERS, AMBULATION.  Please address the above need for approval. Thank you,  Beatrice Lynne LPN/PCN [facility]

## 2024-03-29 NOTE — PROGRESS NOTES
ANTICOAGULATION MANAGEMENT     Patient Name:  Savanna Rehman  Date:  2020    ASSESSMENT /SUBJECTIVE:    Today's INR result of 2.7 is therapeutic. Goal INR of 2.0-3.0      Warfarin dose taken: Warfarin taken as instructed    Diet: No new diet changes affecting INR    Medication changes/ interactions: No new medications/supplements affecting INR    Previous INR: Therapeutic     S/S of bleeding or thromboembolism: No    New injury or illness: No    Upcoming surgery, procedure or cardioversion: No    Additional findings: None      PLAN:    Telephone call with home care nurse Edgar regarding INR result and instructed:     Warfarin Dosing Instructions: Continue your current warfarin dose 2.5 mg daily    Instructed patient to follow up no later than: 2 weeks  Orders given to  Homecare nurse/facility to recheck    Education provided: None required      Fannie, home care verbalizes understanding and agrees to warfarin dosing plan.    Instructed to call the Anticoagulation Clinic for any changes, questions or concerns. (#[phone])        Iris Kemp RN      OBJECTIVE:  Recent labs: (last 7 days)     20   INR 2.7*         No question data found.  Anticoagulation Summary  As of 2020    INR goal:  2.0-3.0   TTR:  71.8 % (1 y)   INR used for dosin.7 (2020)   Warfarin maintenance plan:  2.5 mg (2.5 mg x 1) every day   Full warfarin instructions:  2.5 mg every day   Weekly warfarin total:  17.5 mg   No change documented:  Iris Kemp RN   Plan last modified:  Azul Whitaker RN (10/28/2020)   Next INR check:  2020   Priority:  Maintenance   Target end date:  Indefinite    Indications    Permanent atrial fibrillation (H) (Resolved) [I48.21]  Chronic atrial fibrillation (H) [I48.20]  Long term (current) use of anticoagulants [Z79.01]             Anticoagulation Episode Summary     INR check location:      Preferred lab:  EXTERNAL LAB    Send INR reminders to:  NIKKI CHASE     Patient informed scripts were sent   Comments:  Home care       Anticoagulation Care Providers     Provider Role Specialty Phone number    Patti Suárez MD Referring Internal Medicine [phone]

## 2024-04-01 ENCOUNTER — HOSPITAL ENCOUNTER (INPATIENT)
Facility: CLINIC | Age: 84
LOS: 3 days | Discharge: ANOTHER HOSPITAL | DRG: 391 | End: 2024-04-04
Attending: EMERGENCY MEDICINE | Admitting: STUDENT IN AN ORGANIZED HEALTH CARE EDUCATION/TRAINING PROGRAM
Payer: COMMERCIAL

## 2024-04-01 ENCOUNTER — TELEPHONE (OUTPATIENT)
Dept: INTERNAL MEDICINE | Facility: CLINIC | Age: 84
End: 2024-04-01

## 2024-04-01 ENCOUNTER — TELEPHONE (OUTPATIENT)
Dept: INTERNAL MEDICINE | Facility: CLINIC | Age: 84
End: 2024-04-01
Payer: COMMERCIAL

## 2024-04-01 ENCOUNTER — APPOINTMENT (OUTPATIENT)
Dept: CT IMAGING | Facility: CLINIC | Age: 84
DRG: 391 | End: 2024-04-01
Attending: EMERGENCY MEDICINE
Payer: COMMERCIAL

## 2024-04-01 DIAGNOSIS — S30.0XXA HEMATOMA OF BUTTOCK: ICD-10-CM

## 2024-04-01 DIAGNOSIS — K57.20 COLONIC DIVERTICULAR ABSCESS: ICD-10-CM

## 2024-04-01 DIAGNOSIS — K57.92 DIVERTICULITIS: ICD-10-CM

## 2024-04-01 LAB
ALBUMIN SERPL BCG-MCNC: 3.9 G/DL (ref 3.5–5.2)
ALBUMIN UR-MCNC: NEGATIVE MG/DL
ALP SERPL-CCNC: 118 U/L (ref 40–150)
ALT SERPL W P-5'-P-CCNC: 24 U/L (ref 0–50)
ANION GAP SERPL CALCULATED.3IONS-SCNC: 9 MMOL/L (ref 7–15)
APPEARANCE UR: CLEAR
AST SERPL W P-5'-P-CCNC: 44 U/L (ref 0–45)
BASOPHILS # BLD AUTO: 0 10E3/UL (ref 0–0.2)
BASOPHILS NFR BLD AUTO: 0 %
BILIRUB SERPL-MCNC: 1.2 MG/DL
BILIRUB UR QL STRIP: NEGATIVE
BUN SERPL-MCNC: 20.6 MG/DL (ref 8–23)
CALCIUM SERPL-MCNC: 9.2 MG/DL (ref 8.8–10.2)
CHLORIDE SERPL-SCNC: 97 MMOL/L (ref 98–107)
COLOR UR AUTO: ABNORMAL
CREAT SERPL-MCNC: 1.18 MG/DL (ref 0.51–0.95)
DEPRECATED HCO3 PLAS-SCNC: 32 MMOL/L (ref 22–29)
EGFRCR SERPLBLD CKD-EPI 2021: 46 ML/MIN/1.73M2
EOSINOPHIL # BLD AUTO: 0.2 10E3/UL (ref 0–0.7)
EOSINOPHIL NFR BLD AUTO: 4 %
ERYTHROCYTE [DISTWIDTH] IN BLOOD BY AUTOMATED COUNT: 14 % (ref 10–15)
GLUCOSE BLDC GLUCOMTR-MCNC: 169 MG/DL (ref 70–99)
GLUCOSE SERPL-MCNC: 99 MG/DL (ref 70–99)
GLUCOSE UR STRIP-MCNC: NEGATIVE MG/DL
HCT VFR BLD AUTO: 38.5 % (ref 35–47)
HGB BLD-MCNC: 12.9 G/DL (ref 11.7–15.7)
HGB UR QL STRIP: NEGATIVE
HOLD SPECIMEN: NORMAL
HOLD SPECIMEN: NORMAL
IMM GRANULOCYTES # BLD: 0 10E3/UL
IMM GRANULOCYTES NFR BLD: 0 %
INR PPP: 2.12 (ref 0.85–1.15)
KETONES UR STRIP-MCNC: NEGATIVE MG/DL
LEUKOCYTE ESTERASE UR QL STRIP: NEGATIVE
LYMPHOCYTES # BLD AUTO: 1.2 10E3/UL (ref 0.8–5.3)
LYMPHOCYTES NFR BLD AUTO: 26 %
MCH RBC QN AUTO: 30.9 PG (ref 26.5–33)
MCHC RBC AUTO-ENTMCNC: 33.5 G/DL (ref 31.5–36.5)
MCV RBC AUTO: 92 FL (ref 78–100)
MONOCYTES # BLD AUTO: 0.6 10E3/UL (ref 0–1.3)
MONOCYTES NFR BLD AUTO: 14 %
NEUTROPHILS # BLD AUTO: 2.5 10E3/UL (ref 1.6–8.3)
NEUTROPHILS NFR BLD AUTO: 56 %
NITRATE UR QL: NEGATIVE
NRBC # BLD AUTO: 0 10E3/UL
NRBC BLD AUTO-RTO: 0 /100
PH UR STRIP: 8 [PH] (ref 5–7)
PLATELET # BLD AUTO: 139 10E3/UL (ref 150–450)
POTASSIUM SERPL-SCNC: 3.8 MMOL/L (ref 3.4–5.3)
PROT SERPL-MCNC: 7.9 G/DL (ref 6.4–8.3)
RBC # BLD AUTO: 4.18 10E6/UL (ref 3.8–5.2)
RBC URINE: <1 /HPF
SODIUM SERPL-SCNC: 138 MMOL/L (ref 135–145)
SP GR UR STRIP: 1 (ref 1–1.03)
SQUAMOUS EPITHELIAL: 1 /HPF
UROBILINOGEN UR STRIP-MCNC: NORMAL MG/DL
WBC # BLD AUTO: 4.5 10E3/UL (ref 4–11)
WBC URINE: <1 /HPF

## 2024-04-01 PROCEDURE — 36415 COLL VENOUS BLD VENIPUNCTURE: CPT | Performed by: EMERGENCY MEDICINE

## 2024-04-01 PROCEDURE — 250N000011 HC RX IP 250 OP 636: Performed by: EMERGENCY MEDICINE

## 2024-04-01 PROCEDURE — 80053 COMPREHEN METABOLIC PANEL: CPT | Performed by: EMERGENCY MEDICINE

## 2024-04-01 PROCEDURE — 74177 CT ABD & PELVIS W/CONTRAST: CPT

## 2024-04-01 PROCEDURE — 85610 PROTHROMBIN TIME: CPT | Performed by: EMERGENCY MEDICINE

## 2024-04-01 PROCEDURE — 85025 COMPLETE CBC W/AUTO DIFF WBC: CPT | Performed by: EMERGENCY MEDICINE

## 2024-04-01 PROCEDURE — 250N000009 HC RX 250: Performed by: EMERGENCY MEDICINE

## 2024-04-01 PROCEDURE — 99285 EMERGENCY DEPT VISIT HI MDM: CPT | Mod: 25

## 2024-04-01 PROCEDURE — 99223 1ST HOSP IP/OBS HIGH 75: CPT | Performed by: PHYSICIAN ASSISTANT

## 2024-04-01 PROCEDURE — 81001 URINALYSIS AUTO W/SCOPE: CPT | Performed by: EMERGENCY MEDICINE

## 2024-04-01 PROCEDURE — 120N000001 HC R&B MED SURG/OB

## 2024-04-01 RX ORDER — ACETAMINOPHEN 325 MG/1
650 TABLET ORAL EVERY 4 HOURS PRN
Status: DISCONTINUED | OUTPATIENT
Start: 2024-04-01 | End: 2024-04-04 | Stop reason: HOSPADM

## 2024-04-01 RX ORDER — DEXTROSE MONOHYDRATE 25 G/50ML
25-50 INJECTION, SOLUTION INTRAVENOUS
Status: DISCONTINUED | OUTPATIENT
Start: 2024-04-01 | End: 2024-04-03

## 2024-04-01 RX ORDER — ONDANSETRON 4 MG/1
4 TABLET, ORALLY DISINTEGRATING ORAL EVERY 6 HOURS PRN
Status: DISCONTINUED | OUTPATIENT
Start: 2024-04-01 | End: 2024-04-04 | Stop reason: HOSPADM

## 2024-04-01 RX ORDER — ONDANSETRON 2 MG/ML
4 INJECTION INTRAMUSCULAR; INTRAVENOUS EVERY 6 HOURS PRN
Status: DISCONTINUED | OUTPATIENT
Start: 2024-04-01 | End: 2024-04-04 | Stop reason: HOSPADM

## 2024-04-01 RX ORDER — FAMOTIDINE 20 MG/1
20 TABLET, FILM COATED ORAL DAILY
Status: ON HOLD | COMMUNITY
End: 2024-08-31

## 2024-04-01 RX ORDER — PIPERACILLIN SODIUM, TAZOBACTAM SODIUM 3; .375 G/15ML; G/15ML
3.38 INJECTION, POWDER, LYOPHILIZED, FOR SOLUTION INTRAVENOUS EVERY 6 HOURS
Status: DISCONTINUED | OUTPATIENT
Start: 2024-04-02 | End: 2024-04-04

## 2024-04-01 RX ORDER — NICOTINE POLACRILEX 4 MG
15-30 LOZENGE BUCCAL
Status: DISCONTINUED | OUTPATIENT
Start: 2024-04-01 | End: 2024-04-03

## 2024-04-01 RX ORDER — PIPERACILLIN SODIUM, TAZOBACTAM SODIUM 4; .5 G/20ML; G/20ML
4.5 INJECTION, POWDER, LYOPHILIZED, FOR SOLUTION INTRAVENOUS ONCE
Status: COMPLETED | OUTPATIENT
Start: 2024-04-01 | End: 2024-04-01

## 2024-04-01 RX ORDER — WARFARIN SODIUM 2.5 MG/1
TABLET ORAL DAILY
Status: ON HOLD | COMMUNITY
End: 2024-09-07

## 2024-04-01 RX ORDER — IOPAMIDOL 755 MG/ML
120 INJECTION, SOLUTION INTRAVASCULAR ONCE
Status: COMPLETED | OUTPATIENT
Start: 2024-04-01 | End: 2024-04-01

## 2024-04-01 RX ADMIN — SODIUM CHLORIDE 65 ML: 9 INJECTION, SOLUTION INTRAVENOUS at 18:39

## 2024-04-01 RX ADMIN — PIPERACILLIN AND TAZOBACTAM 4.5 G: 4; .5 INJECTION, POWDER, FOR SOLUTION INTRAVENOUS at 20:39

## 2024-04-01 RX ADMIN — IOPAMIDOL 100 ML: 755 INJECTION, SOLUTION INTRAVENOUS at 18:38

## 2024-04-01 ASSESSMENT — ACTIVITIES OF DAILY LIVING (ADL)
ADLS_ACUITY_SCORE: 40
ADLS_ACUITY_SCORE: 36
ADLS_ACUITY_SCORE: 38
ADLS_ACUITY_SCORE: 42
ADLS_ACUITY_SCORE: 38
ADLS_ACUITY_SCORE: 36
ADLS_ACUITY_SCORE: 38

## 2024-04-01 ASSESSMENT — COLUMBIA-SUICIDE SEVERITY RATING SCALE - C-SSRS
2. HAVE YOU ACTUALLY HAD ANY THOUGHTS OF KILLING YOURSELF IN THE PAST MONTH?: NO
6. HAVE YOU EVER DONE ANYTHING, STARTED TO DO ANYTHING, OR PREPARED TO DO ANYTHING TO END YOUR LIFE?: NO
1. IN THE PAST MONTH, HAVE YOU WISHED YOU WERE DEAD OR WISHED YOU COULD GO TO SLEEP AND NOT WAKE UP?: NO

## 2024-04-01 NOTE — ED TRIAGE NOTES
Presents to triage with c/o abdominal pain and diarrhea. Patient was recently treated for UTI and finished abx but has not had relief of symptoms. Patient also endorses severe diarrhea and states that her infectious disease doctor wants her to be tested for c. Diff. Hx liver disease

## 2024-04-01 NOTE — ED PROVIDER NOTES
History     Chief Complaint:  Abdominal Pain and Diarrhea       HPI   Savanna Rehman is a 83 year old female who presents with bladder pain for 9 days and 4 days of diarrhea (although this is chronic) and vomiting  x1 day (also has had this intermittently) and bilateral leg swelling for many weeks despite lasix 20mg daily. Pt was seen in the ER 3/23/24 for cystitis and was prescribed keflex , which she finished. Cx grew klebsiella. Pt cites lack of bladder control x5 years. No fever.  Pt takes coumadin for afib. NO SOB.     Review of External Notes:   I reviewed patient's note from 6/13/2023, where she had a traumatic buttock hematoma    Medications:    No current outpatient medications on file.    Past Medical History:    Past Medical History:   Diagnosis Date    Acute encephalopathy 09/21/2023    Anemia     Atrial fibrillation (H)     CAD (coronary artery disease)     Chronic pain     Congestive heart failure (H)     Degenerative disk disease     Diabetes (H)     Fibromyalgia     Gastro-oesophageal reflux disease     Gout     Hiatal hernia     Mumps     Neuropathy     CRISTIANA (obstructive sleep apnea) - CPAP     Palpitations     Pernicious anemia     Sleep apnea     Urinary incontinence     Vitamin D deficiency      Past Surgical History:    Past Surgical History:   Procedure Laterality Date    APPENDECTOMY      CHOLECYSTECTOMY      COLONOSCOPY  3/15/2011    COLONOSCOPY N/A 3/15/2023    Procedure: COLONOSCOPY, WITH POLYPECTOMY AND BIOPSY;  Surgeon: Maci Coronel MD;  Location:  GI    COLONOSCOPY N/A 3/15/2023    Procedure: COLONOSCOPY, FLEXIBLE, WITH LESION REMOVAL USING SNARE;  Surgeon: Maci Coronel MD;  Location: [facility]    CORONARY ANGIOGRAPHY ADULT ORDER      CYSTOSCOPY, BIOPSY BLADDER, COMBINED N/A 7/13/2020    Procedure: CYSTOSCOPY, WITH BLADDER BIOPSY;  Surgeon: Deisy Wilson MD;  Location: [facility] OR    HEART CATH LEFT HEART CATH  12/30/16    medication management    HYSTERECTOMY  "TOTAL ABDOMINAL      Knee replacement NOS Left     LAPAROSCOPIC NISSEN FUNDOPLICATION N/A 2/4/2015    Procedure: LAPAROSCOPIC NISSEN FUNDOPLICATION;  Surgeon: Armando Ansari MD;  Location: [facility] OR    TONSILLECTOMY      TRANSPOSITION ULNAR NERVE (ELBOW)       Physical Exam   Patient Vitals for the past 24 hrs:   BP Temp Temp src Pulse Resp SpO2 Height Weight   04/02/24 0001 (!) 104/38 97.7  F (36.5  C) Oral 52 16 97 % -- --   04/01/24 2123 134/61 97.6  F (36.4  C) Oral 62 18 93 % -- --   04/01/24 1600 (!) 147/76 (!) 96.7  F (35.9  C) Temporal 79 18 96 % 1.727 m (5' 8\") 112 kg (246 lb 14.6 oz)     Physical Exam  VS: Reviewed per above  HENT: Mucous membranes moist  EYES: sclera anicteric  CV: Rate as noted,  regular rhythm.   RESP: Effort normal. Breath sounds are normal bilaterally.  GI: moderate lower abdominal tenderness without rebound/guarding, not distended.  NEURO: Alert, moving all extremities  MSK: No deformity of the extremities.  Indurated masslike area to the right buttock region without overlying redness or warmth or open wounds or other skin changes.  SKIN: Warm and dry      Emergency Department Course       Imaging:  CT Abdomen Pelvis w Contrast   Final Result   IMPRESSION:    1.  Changes worrisome for sigmoid diverticulitis with possible adjacent 2.5 x 1.3 x 2.9 cm abscess.      2.  Nonspecific fluid collection in the deep subcutaneous fat over the right buttocks region measuring 7.8 x 3.0 x 5.9 cm and differential would include an abscess without associated gas or seroma/hematoma.      3.  Cirrhotic changes of the liver with associated secondary findings of portal venous hypertension which would include varicosities and splenomegaly. The portal veins are patent.      4.  Mildly prominent right groin lymph node, this may be reactive/inflammatory in nature.        Laboratory:  Labs Ordered and Resulted from Time of ED Arrival to Time of ED Departure   ROUTINE UA WITH MICROSCOPIC REFLEX TO CULTURE - " Abnormal       Result Value    Color Urine Light Yellow      Appearance Urine Clear      Glucose Urine Negative      Bilirubin Urine Negative      Ketones Urine Negative      Specific Gravity Urine 1.005      Blood Urine Negative      pH Urine 8.0 (*)     Protein Albumin Urine Negative      Urobilinogen Urine Normal      Nitrite Urine Negative      Leukocyte Esterase Urine Negative      RBC Urine <1      WBC Urine <1      Squamous Epithelials Urine 1     COMPREHENSIVE METABOLIC PANEL - Abnormal    Sodium 138      Potassium 3.8      Carbon Dioxide (CO2) 32 (*)     Anion Gap 9      Urea Nitrogen 20.6      Creatinine 1.18 (*)     GFR Estimate 46 (*)     Calcium 9.2      Chloride 97 (*)     Glucose 99      Alkaline Phosphatase 118      AST 44      ALT 24      Protein Total 7.9      Albumin 3.9      Bilirubin Total 1.2     CBC WITH PLATELETS AND DIFFERENTIAL - Abnormal    WBC Count 4.5      RBC Count 4.18      Hemoglobin 12.9      Hematocrit 38.5      MCV 92      MCH 30.9      MCHC 33.5      RDW 14.0      Platelet Count 139 (*)     % Neutrophils 56      % Lymphocytes 26      % Monocytes 14      % Eosinophils 4      % Basophils 0      % Immature Granulocytes 0      NRBCs per 100 WBC 0      Absolute Neutrophils 2.5      Absolute Lymphocytes 1.2      Absolute Monocytes 0.6      Absolute Eosinophils 0.2      Absolute Basophils 0.0      Absolute Immature Granulocytes 0.0      Absolute NRBCs 0.0     INR - Abnormal    INR 2.12 (*)    GLUCOSE MONITOR NURSING POCT   C. DIFFICILE TOXIN B PCR WITH REFLEX TO C. DIFFICILE ANTIGEN AND TOXINS A/B EIA   ENTERIC BACTERIA AND VIRUS PANEL BY PCR     Emergency Department Course & Assessments:    Interventions:  Medications   sodium chloride (PF) 0.9% PF flush 3 mL (3 mLs Intracatheter $Given 4/1/24 8960)   sodium chloride (PF) 0.9% PF flush 3 mL (has no administration in time range)   glucose gel 15-30 g (has no administration in time range)     Or   dextrose 50 % injection 25-50 mL (has no  administration in time range)     Or   glucagon injection 1 mg (has no administration in time range)   acetaminophen (TYLENOL) tablet 650 mg (has no administration in time range)   ondansetron (ZOFRAN ODT) ODT tab 4 mg (has no administration in time range)     Or   ondansetron (ZOFRAN) injection 4 mg (has no administration in time range)   insulin aspart (NovoLOG) injection (RAPID ACTING) (has no administration in time range)   piperacillin-tazobactam (ZOSYN) 3.375 g vial to attach to  mL bag (has no administration in time range)   iopamidol (ISOVUE-370) solution 120 mL (100 mLs Intravenous $Given 4/1/24 1838)   Saline CT scan flush (65 mLs Intravenous $Given 4/1/24 1839)   piperacillin-tazobactam (ZOSYN) 4.5 g vial to attach to  mL bag (4.5 g Intravenous $New Bag 4/1/24 2039)     Assessments:  2004 I rechecked the patient and explained findings.         Consultations/Discussion of Management or Tests:  ED Course as of 04/02/24 0047   Mon Apr 01, 2024 2002 I spoke with Silvia Redmond PA-C, who is admitting the patient under Russ Nolen MD, regarding the patient's presentation and plan of care.      2009 I spoke with Dr. Powell from [facility] regarding the patient's presentation and plan of care.          Disposition:  The patient was admitted to the hospital under the care of Russ Nolen MD.     Impression & Plan    Medical Decision Making:  Patient presents to the ER for evaluation of 1 and half weeks of lower abdominal pain as well as recent diarrhea and vomiting.  Vital signs reassuring.  On exam patient does have moderate abdominal tenderness.  CT reveals concern for diverticulitis with adjacent fluid collection in the sigmoid region, question abscess.  There is also fluid collection in the right buttock region.  Patient reports she has had masslike area in this region since a fall and suspected hematoma in June 2023.  No external signs of abscess or skin or soft tissue infection  at this time in the right buttock region.  Patient was given IV antibiotics for suspected diverticulitis.  Colorectal surgery was consulted who recommended IV antibiotics alone at this juncture.  Patient was admitted to hospitalist service for further cares.    Diagnosis:    ICD-10-CM    1. Diverticulitis  K57.92       2. Colonic diverticular abscess  K57.20       3. Hematoma of buttock  S30.0XXA         Scribe Disclosure:  I, Palomo Doshi, am serving as a scribe at 8:05 PM on 4/1/2024 to document services personally performed by Harley Garcia MD, based on my observations and the provider's statements to me.   4/1/2024   Harley Garcia MD Lindenbaum, Elan, MD  04/02/24 0049

## 2024-04-01 NOTE — TELEPHONE ENCOUNTER
Patient calls states she was seen recently in the emergency department for a bladder infection. Was prescribed Keflex, she states she feels it has already returned. She is also having diarrhea.   [place] through our phone call, patient states she is going to call her infections disease doctor, Dr. Granados (sp?) instead, she thanked writer and hung up.

## 2024-04-02 LAB
ALBUMIN SERPL BCG-MCNC: 3.4 G/DL (ref 3.5–5.2)
ALP SERPL-CCNC: 99 U/L (ref 40–150)
ALT SERPL W P-5'-P-CCNC: 20 U/L (ref 0–50)
ANION GAP SERPL CALCULATED.3IONS-SCNC: 11 MMOL/L (ref 7–15)
AST SERPL W P-5'-P-CCNC: 41 U/L (ref 0–45)
BILIRUB SERPL-MCNC: 1.1 MG/DL
BUN SERPL-MCNC: 18.9 MG/DL (ref 8–23)
CALCIUM SERPL-MCNC: 8.6 MG/DL (ref 8.8–10.2)
CHLORIDE SERPL-SCNC: 98 MMOL/L (ref 98–107)
CREAT SERPL-MCNC: 1.25 MG/DL (ref 0.51–0.95)
DEPRECATED HCO3 PLAS-SCNC: 29 MMOL/L (ref 22–29)
EGFRCR SERPLBLD CKD-EPI 2021: 43 ML/MIN/1.73M2
ERYTHROCYTE [DISTWIDTH] IN BLOOD BY AUTOMATED COUNT: 14.1 % (ref 10–15)
GLUCOSE BLDC GLUCOMTR-MCNC: 100 MG/DL (ref 70–99)
GLUCOSE BLDC GLUCOMTR-MCNC: 103 MG/DL (ref 70–99)
GLUCOSE BLDC GLUCOMTR-MCNC: 104 MG/DL (ref 70–99)
GLUCOSE BLDC GLUCOMTR-MCNC: 104 MG/DL (ref 70–99)
GLUCOSE BLDC GLUCOMTR-MCNC: 156 MG/DL (ref 70–99)
GLUCOSE BLDC GLUCOMTR-MCNC: 89 MG/DL (ref 70–99)
GLUCOSE BLDC GLUCOMTR-MCNC: 94 MG/DL (ref 70–99)
GLUCOSE SERPL-MCNC: 101 MG/DL (ref 70–99)
HCT VFR BLD AUTO: 35.7 % (ref 35–47)
HGB BLD-MCNC: 11.7 G/DL (ref 11.7–15.7)
INR PPP: 2.04 (ref 0.85–1.15)
MCH RBC QN AUTO: 30 PG (ref 26.5–33)
MCHC RBC AUTO-ENTMCNC: 32.8 G/DL (ref 31.5–36.5)
MCV RBC AUTO: 92 FL (ref 78–100)
PLATELET # BLD AUTO: 106 10E3/UL (ref 150–450)
POTASSIUM SERPL-SCNC: 4 MMOL/L (ref 3.4–5.3)
PROT SERPL-MCNC: 6.8 G/DL (ref 6.4–8.3)
RBC # BLD AUTO: 3.9 10E6/UL (ref 3.8–5.2)
SODIUM SERPL-SCNC: 138 MMOL/L (ref 135–145)
WBC # BLD AUTO: 4.2 10E3/UL (ref 4–11)

## 2024-04-02 PROCEDURE — 250N000011 HC RX IP 250 OP 636: Performed by: PHYSICIAN ASSISTANT

## 2024-04-02 PROCEDURE — 36415 COLL VENOUS BLD VENIPUNCTURE: CPT | Performed by: STUDENT IN AN ORGANIZED HEALTH CARE EDUCATION/TRAINING PROGRAM

## 2024-04-02 PROCEDURE — 258N000003 HC RX IP 258 OP 636: Performed by: INTERNAL MEDICINE

## 2024-04-02 PROCEDURE — 85027 COMPLETE CBC AUTOMATED: CPT | Performed by: PHYSICIAN ASSISTANT

## 2024-04-02 PROCEDURE — 250N000012 HC RX MED GY IP 250 OP 636 PS 637: Performed by: PHYSICIAN ASSISTANT

## 2024-04-02 PROCEDURE — 85610 PROTHROMBIN TIME: CPT | Performed by: STUDENT IN AN ORGANIZED HEALTH CARE EDUCATION/TRAINING PROGRAM

## 2024-04-02 PROCEDURE — 250N000013 HC RX MED GY IP 250 OP 250 PS 637: Performed by: STUDENT IN AN ORGANIZED HEALTH CARE EDUCATION/TRAINING PROGRAM

## 2024-04-02 PROCEDURE — 250N000013 HC RX MED GY IP 250 OP 250 PS 637: Performed by: PHYSICIAN ASSISTANT

## 2024-04-02 PROCEDURE — 36415 COLL VENOUS BLD VENIPUNCTURE: CPT | Performed by: PHYSICIAN ASSISTANT

## 2024-04-02 PROCEDURE — 120N000001 HC R&B MED SURG/OB

## 2024-04-02 PROCEDURE — 99233 SBSQ HOSP IP/OBS HIGH 50: CPT | Performed by: INTERNAL MEDICINE

## 2024-04-02 PROCEDURE — 82247 BILIRUBIN TOTAL: CPT | Performed by: PHYSICIAN ASSISTANT

## 2024-04-02 RX ORDER — ESCITALOPRAM OXALATE 5 MG/1
5 TABLET ORAL EVERY EVENING
Status: DISCONTINUED | OUTPATIENT
Start: 2024-04-02 | End: 2024-04-04 | Stop reason: HOSPADM

## 2024-04-02 RX ORDER — WARFARIN SODIUM 4 MG/1
4 TABLET ORAL
Status: COMPLETED | OUTPATIENT
Start: 2024-04-02 | End: 2024-04-02

## 2024-04-02 RX ORDER — SODIUM CHLORIDE 9 MG/ML
INJECTION, SOLUTION INTRAVENOUS CONTINUOUS
Status: DISCONTINUED | OUTPATIENT
Start: 2024-04-02 | End: 2024-04-03

## 2024-04-02 RX ORDER — BUTALBITAL, ASPIRIN AND CAFFEINE 50; 325; 40 MG/1; MG/1; MG/1
1 TABLET ORAL EVERY 4 HOURS PRN
Status: DISCONTINUED | OUTPATIENT
Start: 2024-04-02 | End: 2024-04-02

## 2024-04-02 RX ORDER — BUTALBITAL, ASPIRIN, AND CAFFEINE 325; 50; 40 MG/1; MG/1; MG/1
1 CAPSULE ORAL EVERY 4 HOURS PRN
Status: DISCONTINUED | OUTPATIENT
Start: 2024-04-02 | End: 2024-04-04 | Stop reason: HOSPADM

## 2024-04-02 RX ADMIN — PIPERACILLIN AND TAZOBACTAM 3.38 G: 3; .375 INJECTION, POWDER, FOR SOLUTION INTRAVENOUS at 04:14

## 2024-04-02 RX ADMIN — ESCITALOPRAM 5 MG: 5 TABLET, FILM COATED ORAL at 21:00

## 2024-04-02 RX ADMIN — WARFARIN SODIUM 4 MG: 4 TABLET ORAL at 19:02

## 2024-04-02 RX ADMIN — ACETAMINOPHEN 650 MG: 325 TABLET, FILM COATED ORAL at 06:58

## 2024-04-02 RX ADMIN — BUTALBITAL, ASPIRIN, AND CAFFEINE 1 CAPSULE: 50; 325; 40 CAPSULE ORAL at 22:26

## 2024-04-02 RX ADMIN — PIPERACILLIN AND TAZOBACTAM 3.38 G: 3; .375 INJECTION, POWDER, FOR SOLUTION INTRAVENOUS at 16:39

## 2024-04-02 RX ADMIN — PIPERACILLIN AND TAZOBACTAM 3.38 G: 3; .375 INJECTION, POWDER, FOR SOLUTION INTRAVENOUS at 20:58

## 2024-04-02 RX ADMIN — ACETAMINOPHEN 650 MG: 325 TABLET, FILM COATED ORAL at 10:21

## 2024-04-02 RX ADMIN — INSULIN ASPART 1 UNITS: 100 INJECTION, SOLUTION INTRAVENOUS; SUBCUTANEOUS at 01:55

## 2024-04-02 RX ADMIN — SODIUM CHLORIDE: 9 INJECTION, SOLUTION INTRAVENOUS at 09:09

## 2024-04-02 RX ADMIN — PIPERACILLIN AND TAZOBACTAM 3.38 G: 3; .375 INJECTION, POWDER, FOR SOLUTION INTRAVENOUS at 09:07

## 2024-04-02 RX ADMIN — BUTALBITAL, ASPIRIN, AND CAFFEINE 1 CAPSULE: 50; 325; 40 CAPSULE ORAL at 17:43

## 2024-04-02 ASSESSMENT — ACTIVITIES OF DAILY LIVING (ADL)
FALL_HISTORY_WITHIN_LAST_SIX_MONTHS: YES
ADLS_ACUITY_SCORE: 37
ADLS_ACUITY_SCORE: 37
ADLS_ACUITY_SCORE: 33
DRESSING/BATHING_DIFFICULTY: NO
ADLS_ACUITY_SCORE: 33
ADLS_ACUITY_SCORE: 33
DEPENDENT_IADLS:: CLEANING;MEDICATION MANAGEMENT;TRANSPORTATION
ADLS_ACUITY_SCORE: 37
CONCENTRATING,_REMEMBERING_OR_MAKING_DECISIONS_DIFFICULTY: NO
ADLS_ACUITY_SCORE: 33
HEARING_DIFFICULTY_OR_DEAF: NO
WALKING_OR_CLIMBING_STAIRS_DIFFICULTY: YES
ADLS_ACUITY_SCORE: 42
ADLS_ACUITY_SCORE: 33
EQUIPMENT_CURRENTLY_USED_AT_HOME: WALKER, ROLLING
ADLS_ACUITY_SCORE: 33
ADLS_ACUITY_SCORE: 33
TOILETING_ISSUES: NO
ADLS_ACUITY_SCORE: 33
ADLS_ACUITY_SCORE: 37
DIFFICULTY_EATING/SWALLOWING: NO
ADLS_ACUITY_SCORE: 37
ADLS_ACUITY_SCORE: 37
WEAR_GLASSES_OR_BLIND: YES
ADLS_ACUITY_SCORE: 33
DIFFICULTY_COMMUNICATING: NO
ADLS_ACUITY_SCORE: 33
WALKING_OR_CLIMBING_STAIRS: STAIR CLIMBING DIFFICULTY, ASSISTANCE 1 PERSON
ADLS_ACUITY_SCORE: 37
ADLS_ACUITY_SCORE: 37
ADLS_ACUITY_SCORE: 33
DOING_ERRANDS_INDEPENDENTLY_DIFFICULTY: NO
ADLS_ACUITY_SCORE: 37
NUMBER_OF_TIMES_PATIENT_HAS_FALLEN_WITHIN_LAST_SIX_MONTHS: 1
ADLS_ACUITY_SCORE: 33
CHANGE_IN_FUNCTIONAL_STATUS_SINCE_ONSET_OF_CURRENT_ILLNESS/INJURY: NO
ADLS_ACUITY_SCORE: 34

## 2024-04-02 NOTE — ED NOTES
[facility]  ED Nurse Handoff Report    ED Chief complaint: Abdominal Pain and Diarrhea  . ED Diagnosis:   Final diagnoses:   Diverticulitis   Colonic diverticular abscess   Hematoma of buttock       Allergies:   Allergies   Allergen Reactions    Augmented Betamethasone Diprop [Betamethasone] Other (See Comments)     Severe yeast infection    Petroleum Jelly [Petrolatum] Anaphylaxis     Rash and swelling    Shellfish-Derived Products Anaphylaxis     Tongue swelling    Aspirin Swelling     tiongue swelling    Bacitracin      Rash swelling    Bactrim [Sulfamethoxazole-Trimethoprim] Dizziness    Coumadin [Warfarin] Swelling     Leg swelling    Darvon [Propoxyphene] Swelling     Throat closes    Dilaudid [Hydromorphone]      No side effects from fentanyl June 2023    Levaquin [Levofloxacin] Swelling     Tongue swelling    Lidocaine     Morphine Unknown     [facility]    Neomycin Swelling     rash    Neosporin [Neomycin-Polymyxin-Gramicidin] Swelling     rash    Nitrofurantoin      SOB, GI upset,    Oxycodone      Severe itching    Percocet [Oxycodone-Acetaminophen] Unknown    Percodan [Oxycodone-Aspirin]      Severe itching    Polymyxin B     Pramoxine     Tramadol     Vicodin [Hydrocodone-Acetaminophen]      Severe itching      Xarelto [Rivaroxaban]     Adhesive Tape Rash     Band aids     Codeine Rash    Hydrocortisone Rash and Swelling    Other Environmental Allergy Rash     Adhesive tape   Band aids        Code Status: Full Code    Activity level - Baseline/Home:  independent.  Activity Level - Current:   assist of 1.   Lift room needed: No.   Bariatric: Yes   Needed: No   Isolation: Enteric.   Infection: Not Applicable.     Respiratory status: Room air    Vital Signs (within 30 minutes):   Vitals:    04/01/24 1600   BP: (!) 147/76   Pulse: 79   Resp: 18   Temp: (!) 96.7  F (35.9  C)   TempSrc: Temporal   SpO2: 96%   Weight: 112 kg (246 lb 14.6 oz)   Height:  "1.727 m (5' 8\")       Cardiac Rhythm:  ,      Pain level:    Patient confused: No.   Patient Falls Risk: patient and family education.   Elimination Status: Has voided     Patient Report - Initial Complaint: abd pain.   Focused Assessment: Savanna Rehman is a 83 year old female who presents with bladder pain for 9 days and 4 days diarrhea (although this is chronic) and vomiting  x1 day (also has had this intermittently) and bilateral leg swelling for many weeks despite lasix 20mg daily. Pt was seen in the ER 3/23/24 for cystitis and was prescribed keflex , which she finished. Cx grew klebsiella. Pt cites lack of bladder control x5 years. No fever.  Pt takes coumadin for afib. NO SOB.         Abnormal Results:   Labs Ordered and Resulted from Time of ED Arrival to Time of ED Departure   COMPREHENSIVE METABOLIC PANEL - Abnormal       Result Value    Sodium 138      Potassium 3.8      Carbon Dioxide (CO2) 32 (*)     Anion Gap 9      Urea Nitrogen 20.6      Creatinine 1.18 (*)     GFR Estimate 46 (*)     Calcium 9.2      Chloride 97 (*)     Glucose 99      Alkaline Phosphatase 118      AST 44      ALT 24      Protein Total 7.9      Albumin 3.9      Bilirubin Total 1.2     CBC WITH PLATELETS AND DIFFERENTIAL - Abnormal    WBC Count 4.5      RBC Count 4.18      Hemoglobin 12.9      Hematocrit 38.5      MCV 92      MCH 30.9      MCHC 33.5      RDW 14.0      Platelet Count 139 (*)     % Neutrophils 56      % Lymphocytes 26      % Monocytes 14      % Eosinophils 4      % Basophils 0      % Immature Granulocytes 0      NRBCs per 100 WBC 0      Absolute Neutrophils 2.5      Absolute Lymphocytes 1.2      Absolute Monocytes 0.6      Absolute Eosinophils 0.2      Absolute Basophils 0.0      Absolute Immature Granulocytes 0.0      Absolute NRBCs 0.0     INR - Abnormal    INR 2.12 (*)    ROUTINE UA WITH MICROSCOPIC REFLEX TO CULTURE        CT Abdomen Pelvis w Contrast   Final Result   IMPRESSION:    1.  Changes worrisome for " sigmoid diverticulitis with possible adjacent 2.5 x 1.3 x 2.9 cm abscess.      2.  Nonspecific fluid collection in the deep subcutaneous fat over the right buttocks region measuring 7.8 x 3.0 x 5.9 cm and differential would include an abscess without associated gas or seroma/hematoma.      3.  Cirrhotic changes of the liver with associated secondary findings of portal venous hypertension which would include varicosities and splenomegaly. The portal veins are patent.      4.  Mildly prominent right groin lymph node, this may be reactive/inflammatory in nature.          Treatments provided: Antibiotics  Family Comments: Daughter aware  OBS brochure/video discussed/provided to patient:  N/A  ED Medications:   Medications   piperacillin-tazobactam (ZOSYN) 4.5 g vial to attach to  mL bag (has no administration in time range)   iopamidol (ISOVUE-370) solution 120 mL (100 mLs Intravenous $Given 4/1/24 1838)   Saline CT scan flush (65 mLs Intravenous $Given 4/1/24 1839)       Drips infusing:  No  For the majority of the shift this patient was Green.   Interventions performed were Antibiotics.    Sepsis treatment initiated: No    Cares/treatment/interventions/medications to be completed following ED care: Antibiotics    ED Nurse Name: Jere Estrada RN  8:24 PM  RECEIVING UNIT ED HANDOFF REVIEW    Above ED Nurse Handoff Report was reviewed: Yes  Reviewed by: Natanael White RN on April 1, 2024 at 8:52 PM   REX Mcintosh called the ED to inform them the note was read: Yes

## 2024-04-02 NOTE — PLAN OF CARE
"Goal Outcome Evaluation:           Problem: Adult Inpatient Plan of Care  Goal: Plan of Care Review  Description: The Plan of Care Review/Shift note should be completed every shift.  The Outcome Evaluation is a brief statement about your assessment that the patient is improving, declining, or no change.  This information will be displayed automatically on your shift  note.  Outcome: Not Progressing  Flowsheets (Taken 4/1/2024 4542)  Outcome Evaluation: NPO after midnight/ for procedure  Goal: Patient-Specific Goal (Individualized)  Description: You can add care plan individualizations to a care plan. Examples of Individualization might be:  \"Parent requests to be called daily at 9am for status\", \"I have a hard time hearing out of my right ear\", or \"Do not touch me to wake me up as it startles  me\".  Outcome: Not Progressing  Goal: Absence of Hospital-Acquired Illness or Injury  Outcome: Not Progressing  Intervention: Identify and Manage Fall Risk  Recent Flowsheet Documentation  Taken 4/1/2024 2123 by Natanael White RN  Safety Promotion/Fall Prevention: activity supervised  Intervention: Prevent Skin Injury  Recent Flowsheet Documentation  Taken 4/1/2024 2123 by Natanael White RN  Body Position: position changed independently  Device Skin Pressure Protection: absorbent pad utilized/changed  Intervention: Prevent and Manage VTE (Venous Thromboembolism) Risk  Recent Flowsheet Documentation  Taken 4/1/2024 2123 by Natanael White RN  VTE Prevention/Management: SCDs (sequential compression devices) off  Intervention: Prevent Infection  Recent Flowsheet Documentation  Taken 4/1/2024 2123 by Natanael White RN  Infection Prevention: rest/sleep promoted  Goal: Optimal Comfort and Wellbeing  Outcome: Not Progressing  Goal: Readiness for Transition of Care  Outcome: Not Progressing     Problem: Skin Injury Risk Increased  Goal: Skin Health and Integrity  Outcome: Not Progressing  Intervention: Plan: Nurse Driven " Intervention: Moisture Management  Recent Flowsheet Documentation  Taken 2024 by Natanael White RN  Bathing/Skin Care: incontinence care  Intervention: Optimize Skin Protection  Recent Flowsheet Documentation  Taken 2024 by Natanael White RN  Activity Management: ambulated to bathroom  Head of Bed (HOB) Positioning: HOB at 30-45 degrees     Problem: Intestinal Obstruction  Goal: Optimal Bowel Function  Outcome: Not Progressing  Intervention: Promote Bowel Function  Recent Flowsheet Documentation  Taken 2024 by Natanael White RN  Body Position: position changed independently  Head of Bed (HOB) Positioning: HOB at 30-45 degrees  Positioning/Transfer Devices:   pillows   in use  Chair: Shifted weight  Goal: Fluid and Electrolyte Balance  Outcome: Not Progressing  Goal: Absence of Infection Signs and Symptoms  Outcome: Not Progressing  Goal: Optimize Nutrition Status  Outcome: Not Progressing  Goal: Optimal Pain Control and Function  Outcome: Not Progressing   Outcome Evaluation: NPO after midnight/ for procedure   A & O. VSS, LS diminished, O2 95% RA. Ax 1 with gb/w.  B. Will continue POC.

## 2024-04-02 NOTE — CONSULTS
Care Management Initial Consult    General Information  Assessment completed with: Patient,    Type of CM/SW Visit: Initial Assessment    Primary Care Provider verified and updated as needed:     Readmission within the last 30 days:        Reason for Consult: discharge planning  Advance Care Planning: Advance Care Planning Reviewed: present on chart          Communication Assessment  Patient's communication style: spoken language (English or Bilingual)    Hearing Difficulty or Deaf: no   Wear Glasses or Blind: yes    Cognitive  Cognitive/Neuro/Behavioral: WDL        Orientation: oriented x 4             Living Environment:   People in home: alone     Current living Arrangements: apartment      Able to return to prior arrangements: yes       Family/Social Support:  Care provided by: self, homecare agency  Provides care for: no one                Description of Support System:           Current Resources:   Patient receiving home care services: Yes  Skilled Home Care Services: Skilled Nursing  Community Resources: County Worker, Transportation Services, County Programs, Housekeeping/Chore Agency  Equipment currently used at home: walker, rolling, other (see comments) (power scooter)  Supplies currently used at home: Incontinence Supplies    Employment/Financial:  Employment Status:          Financial Concerns:             Does the patient's insurance plan have a 3 day qualifying hospital stay waiver?  Yes     Which insurance plan 3 day waiver is available? Alternative insurance waiver    Will the waiver be used for post-acute placement? Undetermined at this time    Lifestyle & Psychosocial Needs:  Social Determinants of Health     Food Insecurity: Low Risk  (2/1/2024)    Food Insecurity     Within the past 12 months, did you worry that your food would run out before you got money to buy more?: No     Within the past 12 months, did the food you bought just not last and you didn t have money to get more?: No    Depression: Not at risk (2/28/2024)    PHQ-2     PHQ-2 Score: 0   Recent Concern: Depression - At risk (1/12/2024)    PHQ-2     PHQ-2 Score: 3   Housing Stability: Low Risk  (2/1/2024)    Housing Stability     Do you have housing? : Yes     Are you worried about losing your housing?: No   Tobacco Use: Medium Risk (3/22/2024)    Patient History     Smoking Tobacco Use: Former     Smokeless Tobacco Use: Never     Passive Exposure: Past   Financial Resource Strain: Low Risk  (2/1/2024)    Financial Resource Strain     Within the past 12 months, have you or your family members you live with been unable to get utilities (heat, electricity) when it was really needed?: No   Alcohol Use: Not At Risk (1/27/2022)    AUDIT-C     Frequency of Alcohol Consumption: Monthly or less     Average Number of Drinks: 1 or 2     Frequency of Binge Drinking: Never   Transportation Needs: High Risk (2/1/2024)    Transportation Needs     Within the past 12 months, has lack of transportation kept you from medical appointments, getting your medicines, non-medical meetings or appointments, work, or from getting things that you need?: Yes   Physical Activity: Not on file   Interpersonal Safety: Low Risk  (9/21/2023)    Interpersonal Safety     Do you feel physically and emotionally safe where you currently live?: Yes     Within the past 12 months, have you been hit, slapped, kicked or otherwise physically hurt by someone?: No     Within the past 12 months, have you been humiliated or emotionally abused in other ways by your partner or ex-partner?: No   Stress: Stress Concern Present (1/27/2022)    [employer] - Occupational Stress Questionnaire     Feeling of Stress : Very much   Social Connections: Not on file       Functional Status:  Prior to admission patient needed assistance:   Dependent ADLs:: Ambulation-walker  Dependent IADLs:: Cleaning, Medication Management, Transportation       Mental Health Status:           Chemical Dependency Status:                Values/Beliefs:  Spiritual, Cultural Beliefs, Gnosticist Practices, Values that affect care:                 Additional Information:  Sw consulted for discharge planning. Sw met with pt at bedside.  Pt was pleasant and engaged. Pt reports that she lives in a [address]+ senior apt. She uses a walker and motorized scooter to get around. She gets home care through [facility] and is followed by nursing. She is independent with ADLs/IADLs but gets help with transportation, medication set up (HC RN), and cleaning.     Pt felt her daughter would likely be able to provide transport on discharge.    Pt also requested healthcare directive information. Sw will print and provide pt with honoring choices paperwork.    Social work will continue to follow and assist with discharge planning as needed.    ANA LUISA Deutsch, [facility]  Inpatient Care Coordination  [employer]  [phone]         ANA LUISA Deutsch

## 2024-04-02 NOTE — PHARMACY-ADMISSION MEDICATION HISTORY
Pharmacist Admission Medication History    Admission medication history is complete. The information provided in this note is only as accurate as the sources available at the time of the update.    Information Source(s): Patient, Clinic records, and CareEverywhere/SureScripts via in-person    Pertinent Information: none    Changes made to PTA medication list:  Added: famotidine  Deleted: None  Changed: warfarin dosing per anticoagulation clinic    Allergies reviewed with patient and updates made in EHR: yes    Medication History Completed By: Kermit Severson, RPH 4/1/2024 10:25 PM    PTA Med List   Medication Sig Last Dose    acetaminophen (TYLENOL) 500 MG tablet Take 1,000 mg by mouth every 6 hours as needed for mild pain Past Week    Cholecalciferol (VITAMIN D-3) 125 MCG (5000 UT) TABS Take 5,000 Units by mouth daily 4/1/2024 at am    escitalopram (LEXAPRO) 5 MG tablet Take 1 tablet by mouth once daily 3/31/2024 at pm HS    famotidine (PEPCID) 20 MG tablet Take 20 mg by mouth 2 times daily 4/1/2024 at am    furosemide (LASIX) 20 MG tablet Take 1 tablet (20 mg) by mouth daily 4/1/2024 at am    glipiZIDE (GLUCOTROL XL) 2.5 MG 24 hr tablet Take 1 tablet by mouth twice daily 4/1/2024 at am    indapamide (LOZOL) 2.5 MG tablet Take 1 tablet (2.5 mg) by mouth every morning 4/1/2024 at am    loperamide (IMODIUM) 2 MG capsule TAKE 1 CAPSULE BY MOUTH 4 TIMES DAILY AS NEEDED FOR DIARRHEA 3/31/2024    warfarin ANTICOAGULANT (COUMADIN) 2.5 MG tablet Take 3.75 mg by mouth six times a week Daily except on Mondays 3/31/2024 at pm

## 2024-04-02 NOTE — PLAN OF CARE
"VSS, AO x4, lower abdominal pain unrelieved by interventions but declines escalation, A1 gaitbelt walker, enteric precautions for Cdiff rule out--sample needed.  RA denies SOB, apical pulse regular, BLE 2+ edema, incontinent of bladder brief worn for pt comfort, no BM this shift. Q4  and 100, NPO for bowel rest today may progress to clear liquids tomorrow if abdominal pain improves (see colorectal surgery note).    Goal Outcome Evaluation:   Plan of Care Reviewed With: patient Overall Patient Progress: no change Outcome Evaluation: colorectal consulted today, see notes. NPO for bowel rest today, abdominal pain continues 6-8/10 unrelieved by APAP or other interventions      Problem: Adult Inpatient Plan of Care  Goal: Plan of Care Review  Description: The Plan of Care Review/Shift note should be completed every shift.  The Outcome Evaluation is a brief statement about your assessment that the patient is improving, declining, or no change.  This information will be displayed automatically on your shift  note.  Outcome: Not Progressing  Flowsheets (Taken 4/2/2024 1436)  Outcome Evaluation: colorectal consulted today, see notes. NPO for bowel rest today, abdominal pain continues 6-8/10 unrelieved by APAP or other interventions  Plan of Care Reviewed With: patient  Overall Patient Progress: no change  Goal: Patient-Specific Goal (Individualized)  Description: You can add care plan individualizations to a care plan. Examples of Individualization might be:  \"Parent requests to be called daily at 9am for status\", \"I have a hard time hearing out of my right ear\", or \"Do not touch me to wake me up as it startles  me\".  Outcome: Not Progressing  Goal: Absence of Hospital-Acquired Illness or Injury  Outcome: Not Progressing  Intervention: Identify and Manage Fall Risk  Recent Flowsheet Documentation  Taken 4/2/2024 0900 by Jocelyn Kumar, RN  Safety Promotion/Fall Prevention: safety round/check completed  Goal: Optimal " Comfort and Wellbeing  Outcome: Not Progressing  Intervention: Monitor Pain and Promote Comfort  Recent Flowsheet Documentation  Taken 4/2/2024 1130 by Jocelyn Kumar RN  Pain Management Interventions: declines  Taken 4/2/2024 1100 by Jocelyn Kumar RN  Pain Management Interventions: aromatherapy  Taken 4/2/2024 1021 by Jocelyn Kumar RN  Pain Management Interventions: medication (see MAR)  Taken 4/2/2024 0750 by Jocelyn Kumar RN  Pain Management Interventions:   rest   relaxation techniques promoted  Goal: Readiness for Transition of Care  Outcome: Not Progressing     Problem: Skin Injury Risk Increased  Goal: Skin Health and Integrity  Outcome: Not Progressing  Intervention: Plan: Nurse Driven Intervention: Moisture Management  Recent Flowsheet Documentation  Taken 4/2/2024 0900 by Jocelyn Kumar RN  Moisture Interventions: Incontinence pad     Problem: Intestinal Obstruction  Goal: Optimal Bowel Function  Outcome: Not Progressing  Goal: Fluid and Electrolyte Balance  Outcome: Not Progressing  Goal: Absence of Infection Signs and Symptoms  Outcome: Not Progressing  Goal: Optimize Nutrition Status  Outcome: Not Progressing  Goal: Optimal Pain Control and Function  Outcome: Not Progressing  Intervention: Prevent or Manage Pain  Recent Flowsheet Documentation  Taken 4/2/2024 1130 by Jocelyn Kumar RN  Pain Management Interventions: declines  Taken 4/2/2024 1100 by Jocelyn Kumar RN  Pain Management Interventions: aromatherapy  Taken 4/2/2024 1021 by Jocelyn Kumar RN  Pain Management Interventions: medication (see MAR)  Taken 4/2/2024 0750 by Jocelyn Kumar RN  Pain Management Interventions:   rest   relaxation techniques promoted

## 2024-04-02 NOTE — PROGRESS NOTES
[facility]    Patient is currently receiving home care services from [facility]. The patient is currently receiving PT services.  and home health team have been notified of patient admission. The [facility] Liaison will continue to follow patient during stay. If appropriate provide orders to resume home care at time of discharge and make sure ANJALI date is appropriate, ensuring a safe discharge plan.     Thank you,     ELE Hinkle, LPN  Home Care Liaison   Cell: [phone]

## 2024-04-02 NOTE — CONSULTS
[facility]  Colon and Rectal Surgery Consult Note  Name: Savanna Rehman    MRN: [id number]  YOB: 1940    Age: 83 year old  Date of admission: 4/1/2024  Primary care provider: Taylor Payan     Requesting Physician:  Silvia Redmond PA-C  Reason for consult:  Acute sigmoid diverticulitis with possible adjacent 2.5 x 1.3 x 2.9 cm abscess           History of Present Illness:   Savanna Rehman is a 83 year old female with history of chronic A-fib on Coumadin, hypertension, heart failure with preserved ejection fraction, type 2 diabetes mellitus, CRISTIANA with noncompliance, and FERREIRA, seen at the request of Silvia Redmond PA-C, presents with abdominal pain, nausea, emesis, and diarrhea. Patient reports she was recently treated for a UTI and completed a course of Keflex. Patient has had continued lower abdominal pain since that time that increased yesterday. She also has chronic diarrhea for which she takes Imodium at baseline. Patient had 3 loose stools yesterday and several episodes of emesis, which prompted her to present to the ED. In the ED,  patient was afebrile. She was hypertensive to 147/76, HR 79, SpO2 96%. Laboratory workup was notable for Cr 1.25, calcium 8.6, glucose 101, and platelets 106. WBC was normal. CT A/P revealed changes concerning for sigmoid diverticulitis with a possible adjacent 2.5 x 1.3 x 2.9 cm abscess. Also noted was a nonspecific fluid collection in the deep subcutaneous fat over the right buttock consistent with a known prior fall and cirrhotic changes of the liver with associated portal venous hypertension. IV antibiotics were initiated and patient was admitted for further management.    Today, patient is resting comfortably in bed. She is afebrile and hemodynamically stable. Patient denies any further nausea or emesis. She has not had a bowel movement since admission. She is passing gas. She reports abdominal pain in the lower abdomen that is improving  since yesterday. She denies any previous episodes of diverticulitis. No known family history of colon cancer or inflammatory bowel disease. Of note, patient follows with [facility] for diarrhea and takes 1 tablet of Imodium twice daily and additional doses as needed.     Colonoscopy History:  3/15/2023: 3 tubular adenomas, random biopsies negative for chronic or microscopic colitis    Surgical History: DAJUAN, appendectomy, cholecystectomy, laparoscopic nissen fundoplication            Past Medical History:     Past Medical History:   Diagnosis Date    Acute encephalopathy 09/21/2023    Anemia     Iron Deficiency anemia    Atrial fibrillation (H)     CAD (coronary artery disease)     non-obstructive    Chronic pain     neck, low back, legs    Congestive heart failure (H)     Degenerative disk disease     Diabetes (H)     Fibromyalgia     Gastro-oesophageal reflux disease     Gout     Hiatal hernia     Mumps     Neuropathy     CRISTIANA (obstructive sleep apnea) - CPAP     Palpitations     Pernicious anemia     Sleep apnea     uses CPAP.    Urinary incontinence     Vitamin D deficiency              Past Surgical History:     Past Surgical History:   Procedure Laterality Date    APPENDECTOMY      CHOLECYSTECTOMY      COLONOSCOPY  3/15/2011    COLONOSCOPY N/A 3/15/2023    Procedure: COLONOSCOPY, WITH POLYPECTOMY AND BIOPSY;  Surgeon: Maci Coronel MD;  Location:  GI    COLONOSCOPY N/A 3/15/2023    Procedure: COLONOSCOPY, FLEXIBLE, WITH LESION REMOVAL USING SNARE;  Surgeon: Maci Coronel MD;  Location: [facility]    CORONARY ANGIOGRAPHY ADULT ORDER      CYSTOSCOPY, BIOPSY BLADDER, COMBINED N/A 7/13/2020    Procedure: CYSTOSCOPY, WITH BLADDER BIOPSY;  Surgeon: Deisy Wilson MD;  Location: [facility] OR    HEART CATH LEFT HEART CATH  12/30/16    medication management    HYSTERECTOMY TOTAL ABDOMINAL      Knee replacement NOS Left     LAPAROSCOPIC NISSEN FUNDOPLICATION N/A 2/4/2015    Procedure: LAPAROSCOPIC NISSEN  FUNDOPLICATION;  Surgeon: Armando Ansari MD;  Location: [facility] OR    TONSILLECTOMY      TRANSPOSITION ULNAR NERVE (ELBOW)                 Social History:     Social History     Tobacco Use    Smoking status: Former     Packs/day: 0.50     Years: 50.00     Additional pack years: 0.00     Total pack years: 25.00     Types: Cigarettes     Quit date: 2014     Years since quittin.5     Passive exposure: Past    Smokeless tobacco: Never   Substance Use Topics    Alcohol use: Yes     Comment: couple a month             Family History:     Family History   Problem Relation Age of Onset    Alzheimer Disease Mother     Lung Cancer Father     No Known Problems Brother     No Known Problems Brother     No Known Problems Brother     Unknown/Adopted No family hx of              Allergies:     Allergies   Allergen Reactions    Augmented Betamethasone Diprop [Betamethasone] Other (See Comments)     Severe yeast infection    Petroleum Jelly [Petrolatum] Anaphylaxis     Rash and swelling    Shellfish-Derived Products Anaphylaxis     Tongue swelling    Aspirin Swelling     tiongue swelling    Bacitracin      Rash swelling    Bactrim [Sulfamethoxazole-Trimethoprim] Dizziness    Coumadin [Warfarin] Swelling     Leg swelling    Darvon [Propoxyphene] Swelling     Throat closes    Dilaudid [Hydromorphone]      No side effects from fentanyl 2023    Levaquin [Levofloxacin] Swelling     Tongue swelling    Lidocaine     Morphine Unknown     Per [facility]    Neomycin Swelling     rash    Neosporin [Neomycin-Polymyxin-Gramicidin] Swelling     rash    Nitrofurantoin      SOB, GI upset,    Oxycodone      Severe itching    Percocet [Oxycodone-Acetaminophen] Unknown    Percodan [Oxycodone-Aspirin]      Severe itching    Polymyxin B     Pramoxine     Tramadol     Vicodin [Hydrocodone-Acetaminophen]      Severe itching      Xarelto [Rivaroxaban]     Adhesive Tape Rash     Band aids     Codeine Rash    Hydrocortisone Rash  "and Swelling    Other Environmental Allergy Rash     Adhesive tape   Band aids              Medications:      escitalopram  5 mg Oral QPM    insulin aspart  1-7 Units Subcutaneous Q4H    piperacillin-tazobactam  3.375 g Intravenous Q6H    sodium chloride (PF)  3 mL Intracatheter Q8H             Review of Systems:   A comprehensive greater than 10 system review of systems was carried out.  Pertinent positives and negatives are noted above.  Otherwise negative for contributory info.            Physical Exam:     Blood pressure 111/44, pulse 52, temperature 97.8  F (36.6  C), temperature source Oral, resp. rate 18, height 1.727 m (5' 8\"), weight 111.3 kg (245 lb 4.8 oz), SpO2 91%, not currently breastfeeding.  No intake or output data in the 24 hours ending 04/02/24 1027  EXAM:  GEN: Awake alert and oriented, appears  stated age  PULM: Non-labored breathing with normal respiratory effort  ABD: Soft, mildly tender in lower abdomen, non- distended. No rebound or guarding   RECTAL: Rectal exam was deferred  NEURO: CN II-XII grossly intact  MSK: able to move all four extremities  PSYCH: responsive, alert, cooperative; oriented x3; appropriate mood and affect  EXT/SKIN: inspection reveals no rashes, lesions or ulcers, normal coloring         Data Reviewed:     Results for orders placed or performed during the hospital encounter of 04/01/24   CT Abdomen Pelvis w Contrast    Narrative    EXAM: CT ABDOMEN PELVIS W CONTRAST  LOCATION: [facility]  DATE: 4/1/2024    INDICATION: Abdominal pain, vomiting, diarrhea.  COMPARISON: CT 03/13/2022  TECHNIQUE: CT scan of the abdomen and pelvis was performed following injection of IV contrast. Multiplanar reformats were obtained. Dose reduction techniques were used.  CONTRAST: 100mL Isovue 370    FINDINGS:   LOWER CHEST: Mild to moderate coronary artery calcification.    HEPATOBILIARY: The gallbladder is surgically absent. Mild cirrhotic configuration of the liver. " The portal veins are patent and the bile ducts are normal in caliber. There are secondary findings of portal venous hypertension which would include scattered   varicosities and splenomegaly.    PANCREAS: Significant atrophy.    SPLEEN: Splenomegaly with the spleen measuring approximately 15 cm in greatest longitudinal dimension.    ADRENAL GLANDS: Normal.    KIDNEYS/BLADDER: Normal.    BOWEL: There is thickening seen involving the proximal and mid portions of the sigmoid colon with scattered diverticuli. The above findings would be worrisome for diverticulitis. Just lateral to the mid sigmoid colon, there is an abnormal fluid   collection measuring approximately 2.5 x 1.3 x 2.9 cm and this is nonspecific, but could represent an abscess without associated gas. On CT 03/13/2022, there was a tiny abnormality at this same location; however, this only measured 1.2 x 0.6 x 1.2 cm.   The appendix is not seen and is likely surgically absent or atrophic. Prior postoperative changes near the EG junction.    LYMPH NODES: Mildly prominent right groin lymph node measuring 1.6 x 1.1 cm and differential would include an abscess without associated gas or a seroma/hematoma. This is new since 03/13/2022.    VASCULATURE: Moderate to marked calcification with no aneurysmal dilatation.    PELVIC ORGANS: Surgically absent.    MUSCULOSKELETAL: There is an abnormal well-circumscribed fluid collection with no associated gas seen involving the subcutaneous fat of the mid right buttocks region and this measures approximately 7.8 x 3.0 x 5.9 cm       Impression    IMPRESSION:   1.  Changes worrisome for sigmoid diverticulitis with possible adjacent 2.5 x 1.3 x 2.9 cm abscess.    2.  Nonspecific fluid collection in the deep subcutaneous fat over the right buttocks region measuring 7.8 x 3.0 x 5.9 cm and differential would include an abscess without associated gas or seroma/hematoma.    3.  Cirrhotic changes of the liver with associated  "secondary findings of portal venous hypertension which would include varicosities and splenomegaly. The portal veins are patent.    4.  Mildly prominent right groin lymph node, this may be reactive/inflammatory in nature.     *Note: Due to a large number of results and/or encounters for the requested time period, some results have not been displayed. A complete set of results can be found in Results Review.       Recent Labs   Lab 04/02/24  0632 04/01/24  1625   WBC 4.2 4.5   HGB 11.7 12.9   HCT 35.7 38.5   MCV 92 92   * 139*     Recent Labs   Lab 04/02/24  0849 04/02/24  0632 04/02/24  0532 04/01/24  2139 04/01/24  1625   NA  --  138  --   --  138   POTASSIUM  --  4.0  --   --  3.8   CHLORIDE  --  98  --   --  97*   CO2  --  29  --   --  32*   ANIONGAP  --  11  --   --  9   * 101* 104*   < > 99   BUN  --  18.9  --   --  20.6   CR  --  1.25*  --   --  1.18*   GFRESTIMATED  --  43*  --   --  46*   SAUL  --  8.6*  --   --  9.2   PROTTOTAL  --  6.8  --   --  7.9   ALBUMIN  --  3.4*  --   --  3.9   BILITOTAL  --  1.1  --   --  1.2   ALKPHOS  --  99  --   --  118   AST  --  41  --   --  44   ALT  --  20  --   --  24    < > = values in this interval not displayed.     Recent Labs   Lab 04/01/24  1625 03/26/24  1306   INR 2.12* 2.5*     No results for input(s): \"LACT\" in the last 168 hours.  Recent Labs   Lab 04/01/24  2030   COLOR Light Yellow   APPEARANCE Clear   URINEGLC Negative   URINEBILI Negative   URINEKETONE Negative   SG 1.005   UBLD Negative   URINEPH 8.0*   PROTEIN Negative   NITRITE Negative   LEUKEST Negative   RBCU <1   WBCU <1         Assessment and Plan:   Savanna Rehman is a 83 year old female with history of chronic A-fib on Coumadin, hypertension, heart failure with preserved ejection fraction, type 2 diabetes mellitus, CRISTIANA with noncompliance, and FERREIRA, seen at the request of Silvia Redmond PA-C, presents with abdominal pain, nausea, emesis, and diarrhea. CT A/P demonstrated changes " concerning for sigmoid diverticulitis with a possible adjacent 2.5 x 1.3 x 2.9 cm abscess. Also noted was a nonspecific fluid collection in the deep subcutaneous fat over the right buttock consistent with a known prior fall and cirrhotic changes of the liver with associated portal venous hypertension. Abdominal exam is benign with mild tenderness to palpation in the lower abdomen. She is hemodynamically stable and no emergent surgery is indicated at this time. Abscess is not amenable to IR drainage due to location. Recommend continued conservative management including bowel rest, IVF, IV antibiotics, and pain management as needed. Did briefly discuss that if she does not have any improvement or acutely worsens, she would likely require more emergent surgery which would likely entail a sigmoid resection with a temporary stoma. Patient will need a higher level of care if surgery is required given her cirrhosis. Agree with C. Difficile and stool studies. We will continue to follow along.       Plan:  Admit to hospitalist  Surgery: No emergent surgery indicated  Diet: NPO  IV Fluids: per hospitalist  Antibiotics: per hospitalist  Medications: Continue home meds per hospitalist  I&O s:  strict I&O s  Labs:   - Reviewed: by myself per hospitalist  - Ordered: none   Imaging:   - Dr. Stafford and myself have personally viewed: CT abd/pelvis  - Ordered:  none  Activity: ambulate as tolerated, encourage OOB  DVT prophylaxis: SCD s  This plan has been discussed with Dr. Stafford    Patient specific identified risk factors considered as part of today s evaluation include: T2DM, Warfarin, BMI>30, previous abdominal surgeries     Additional history obtained from chart review and patient.  Time spent on consultation: 60 minutes, greater than 50 percent of the total encounter time is spent in counseling and/or coordination of care          Landon Billings PA-C  [facility]  Phone:  [phone]

## 2024-04-02 NOTE — ED NOTES
Report received, assumed care of patient, patient resting in bed, no complaints at this time, awaiting MD re-eval and disposition.  Vital signs stable.

## 2024-04-02 NOTE — PROGRESS NOTES
[facility]    Medicine Progress Note - Hospitalist Service    Date of Admission:  4/1/2024    Assessment & Plan      Savanna Rehman is a 83 year old female with a history of chronic A-fib on Coumadin, hypertension, heart failure with preserved ejection fraction, type 2 diabetes mellitus, CRISTIANA with noncompliance, FERREIRA who presents to the ED today due to nausea, emesis and lower abdominal pain with 3 loose stools.     Acute Sigmoid Diverticulitis with possible adjacent 2.5 x 1.3 x 2.9 cm abscess  Pt presents with nausea, emesis, lower abdominal pain and watery stools.  Hx of chronic diarrhea on Imodium at baseline and was recently on Keflex for a UTI.  Pt is afebrile with normal wbc.  CT abd/pelvis reveals changes worrisome for sigmoid diverticulitis with possible adjacent 2.5 x 1.3 x 2.9 cm abscess  - follow up pending UA, cdiff and enteric panel  - continue IV Zosyn  - Colorectal Surgery consult    -Keep her n.p.o. except meds and ice chips until seen by CRS     Chronic Atrial Fibrillation  Coagulopathy due to Warfarin  INR 2.12.  appears she is on warfarin pta.  Awaiting med rec to confirm.  - pharmacy consulted for warfarin dosing   -Hold any further warfarin dosing until seen by colorectal service    HTN  Chronic Diastolic CHF  Has peripheral edema on exam, no shortness of breath.    - hold pta diuretics for now due to npo  - monitor daily weights, I/O     CKD  Creatinine 1.18; at baseline.  -Recent creatinine is 1.25 likely at baseline     DM type 2  Hgb A1C 6.1 (2/2024)  - hold Glipizide  - start ISS protocol     Hx FERREIRA  Chronic Thrombocytopenia  LFTs wnl.  Plt 139.  CT reveals Cirrhotic changes of the liver with associated secondary findings of portal venous hypertension which would include varicosities and splenomegaly. The portal veins are patent.   - follow up with GI     Depression  - continue Lexapro     CRISTIANA, noncompliant with cpap     Right Buttocks Fluid  "Collection  Incidentally noted on CT imaging is a nonspecific fluid collection in the deep subcutaneous fat over the right buttocks region measuring 7.8 x 3.0 x 5.9 cm.  Likely due to hematoma as pt reports this has been present for months following a prior fall.  - monitor     CODE: full  Diet/IVF: npo after midnight for any possible procedures  DVT ppx: warfarin        Diet: NPO for Medical/Clinical Reasons Except for: Meds    DVT Prophylaxis: Pneumatic Compression Devices,  Resume warfarin once okay from surgery perspective  Aguilar Catheter: Not present  Lines: None     Cardiac Monitoring: None  Code Status: Full Code      Clinically Significant Risk Factors Present on Admission              # Hypoalbuminemia: Lowest albumin = 3.4 g/dL at 4/2/2024  6:32 AM, will monitor as appropriate  # Drug Induced Coagulation Defect: home medication list includes an anticoagulant medication  # Thrombocytopenia: Lowest platelets = 106 in last 2 days, will monitor for bleeding    # Chronic heart failure with preserved ejection fraction: heart failure noted on problem list and last echo with EF >50%     # Obesity: Estimated body mass index is 37.3 kg/m  as calculated from the following:    Height as of this encounter: 1.727 m (5' 8\").    Weight as of this encounter: 111.3 kg (245 lb 4.8 oz).              Disposition Plan      Expected Discharge Date: Anticipating 1 to 2 days pending improvement with this diverticulitis and abscess      Destination: home with help/services              Jase Owen MD, MD  Hospitalist Service  [facility]  Securely message with [employer] (more info)  Text page via [employer] Paging/Directory   ______________________________________________________________________    Interval History   I assumed medicine service care today.  Seen and examined.  Chart reviewed.  Case discussed with nursing service who was gracious to be present at bedside during my encounter  I met Ms. Corrales " while she is laying comfortably in bed.  Fortunately she is endorsing no ongoing nausea, vomiting or abdominal pain.  Currently afebrile.  Continues to demonstrate stable hemodynamics.  Not hypoxic.  No mental status changes.  No reported bleeding tendencies either.    Physical Exam   Vital Signs: Temp: 97.8  F (36.6  C) Temp src: Oral BP: 111/44 Pulse: 52   Resp: 18 SpO2: 91 % O2 Device: None (Room air)    Weight: 245 lbs 4.8 oz    HEENT; Atraumatic, normocephalic, pinkish conjuctiva, pupils bilateral reactive   Skin: warm and moist, no rashes  Lymphatics: no cervical or axillary lymphandenopathy  Lungs: equal chest expansion, clear to auscultation, no wheezes, no stridor, no crackles,   Heart: normal rate, normal rhythm, no rubs or gallops.   Abdomen: normal bowel sounds, no tenderness, no peritoneal signs, no guarding  Extremities: no deformities, no edema   Neuro; follow commands, alert and oriented x3, spontaneous speech, coherent, moves all extremities spontaneously  Psych; no hallucination, euthymic mood, not agitated      Medical Decision Making       50 MINUTES SPENT BY ME on the date of service doing chart review, history, exam, documentation & further activities per the note.  MANAGEMENT DISCUSSED with the following over the past 24 hours: Yes   NOTE(S)/MEDICAL RECORDS REVIEWED over the past 24 hours: Yes       Data     I have personally reviewed the following data over the past 24 hrs:    4.2  \   11.7   / 106 (L)     138 98 18.9 /  104 (H)   4.0 29 1.25 (H) \     ALT: 20 AST: 41 AP: 99 TBILI: 1.1   ALB: 3.4 (L) TOT PROTEIN: 6.8 LIPASE: N/A     INR:  2.12 (H) PTT:  N/A   D-dimer:  N/A Fibrinogen:  N/A       Imaging results reviewed over the past 24 hrs:   Recent Results (from the past 24 hour(s))   CT Abdomen Pelvis w Contrast    Narrative    EXAM: CT ABDOMEN PELVIS W CONTRAST  LOCATION: [facility]  DATE: 4/1/2024    INDICATION: Abdominal pain, vomiting, diarrhea.  COMPARISON: CT  03/13/2022  TECHNIQUE: CT scan of the abdomen and pelvis was performed following injection of IV contrast. Multiplanar reformats were obtained. Dose reduction techniques were used.  CONTRAST: 100mL Isovue 370    FINDINGS:   LOWER CHEST: Mild to moderate coronary artery calcification.    HEPATOBILIARY: The gallbladder is surgically absent. Mild cirrhotic configuration of the liver. The portal veins are patent and the bile ducts are normal in caliber. There are secondary findings of portal venous hypertension which would include scattered   varicosities and splenomegaly.    PANCREAS: Significant atrophy.    SPLEEN: Splenomegaly with the spleen measuring approximately 15 cm in greatest longitudinal dimension.    ADRENAL GLANDS: Normal.    KIDNEYS/BLADDER: Normal.    BOWEL: There is thickening seen involving the proximal and mid portions of the sigmoid colon with scattered diverticuli. The above findings would be worrisome for diverticulitis. Just lateral to the mid sigmoid colon, there is an abnormal fluid   collection measuring approximately 2.5 x 1.3 x 2.9 cm and this is nonspecific, but could represent an abscess without associated gas. On CT 03/13/2022, there was a tiny abnormality at this same location; however, this only measured 1.2 x 0.6 x 1.2 cm.   The appendix is not seen and is likely surgically absent or atrophic. Prior postoperative changes near the EG junction.    LYMPH NODES: Mildly prominent right groin lymph node measuring 1.6 x 1.1 cm and differential would include an abscess without associated gas or a seroma/hematoma. This is new since 03/13/2022.    VASCULATURE: Moderate to marked calcification with no aneurysmal dilatation.    PELVIC ORGANS: Surgically absent.    MUSCULOSKELETAL: There is an abnormal well-circumscribed fluid collection with no associated gas seen involving the subcutaneous fat of the mid right buttocks region and this measures approximately 7.8 x 3.0 x 5.9 cm       Impression     IMPRESSION:   1.  Changes worrisome for sigmoid diverticulitis with possible adjacent 2.5 x 1.3 x 2.9 cm abscess.    2.  Nonspecific fluid collection in the deep subcutaneous fat over the right buttocks region measuring 7.8 x 3.0 x 5.9 cm and differential would include an abscess without associated gas or seroma/hematoma.    3.  Cirrhotic changes of the liver with associated secondary findings of portal venous hypertension which would include varicosities and splenomegaly. The portal veins are patent.    4.  Mildly prominent right groin lymph node, this may be reactive/inflammatory in nature.

## 2024-04-02 NOTE — PHARMACY-ANTICOAGULATION SERVICE
Clinical Pharmacy - Warfarin Dosing Consult     Pharmacy has been consulted to manage this patient s warfarin therapy.  Indication: Atrial Fibrillation  Therapy Goal: INR 2-3  Warfarin Prior to Admission: Yes  Warfarin PTA Regimen: 2.5 mg Mon  3.75 mg rest of week  Recent documented change in oral intake/nutrition: Unknown  Dose Comments: 4 mg today    INR   Date Value Ref Range Status   04/02/2024 2.04 (H) 0.85 - 1.15 Final   04/01/2024 2.12 (H) 0.85 - 1.15 Final       Recommend warfarin 4 mg today.  Pharmacy will monitor Savanna Rehman daily and order warfarin doses to achieve specified goal.      Please contact pharmacy as soon as possible if the warfarin needs to be held for a procedure or if the warfarin goals change.

## 2024-04-02 NOTE — PLAN OF CARE
"  Problem: Adult Inpatient Plan of Care  Goal: Plan of Care Review  Description: The Plan of Care Review/Shift note should be completed every shift.  The Outcome Evaluation is a brief statement about your assessment that the patient is improving, declining, or no change.  This information will be displayed automatically on your shift  note.  Outcome: Not Progressing  Flowsheets (Taken 4/2/2024 0256)  Outcome Evaluation:   Colorectal consult   NPO mainatained.  Plan of Care Reviewed With: patient  Overall Patient Progress: no change  Goal: Patient-Specific Goal (Individualized)  Description: You can add care plan individualizations to a care plan. Examples of Individualization might be:  \"Parent requests to be called daily at 9am for status\", \"I have a hard time hearing out of my right ear\", or \"Do not touch me to wake me up as it startles  me\".  Outcome: Not Progressing  Goal: Absence of Hospital-Acquired Illness or Injury  Outcome: Not Progressing  Intervention: Identify and Manage Fall Risk  Recent Flowsheet Documentation  Taken 4/2/2024 0200 by Ad Chavarria RN  Safety Promotion/Fall Prevention: safety round/check completed  Taken 4/2/2024 0100 by Ad Chavarria RN  Safety Promotion/Fall Prevention: safety round/check completed  Taken 4/2/2024 0058 by Ad Chavarria RN  Safety Promotion/Fall Prevention:   safety round/check completed   nonskid shoes/slippers when out of bed   assistive device/personal items within reach   activity supervised   clutter free environment maintained  Taken 4/2/2024 0000 by Ad Chavarria RN  Safety Promotion/Fall Prevention: safety round/check completed  Taken 4/1/2024 2300 by Ad Chavarria RN  Safety Promotion/Fall Prevention: safety round/check completed  Intervention: Prevent Skin Injury  Recent Flowsheet Documentation  Taken 4/2/2024 0058 by Ad Chavarria RN  Body Position: position changed independently  Intervention: Prevent and Manage VTE (Venous " Thromboembolism) Risk  Recent Flowsheet Documentation  Taken 4/2/2024 0058 by Ad Chavarria RN  VTE Prevention/Management: SCDs (sequential compression devices) off  Intervention: Prevent Infection  Recent Flowsheet Documentation  Taken 4/2/2024 0058 by Ad Chavarria RN  Infection Prevention:   single patient room provided   rest/sleep promoted   personal protective equipment utilized   hand hygiene promoted  Goal: Optimal Comfort and Wellbeing  Outcome: Not Progressing  Goal: Readiness for Transition of Care  Outcome: Not Progressing  Flowsheets (Taken 4/2/2024 0100)  Transportation Anticipated: family or friend will provide  Intervention: Mutually Develop Transition Plan  Recent Flowsheet Documentation  Taken 4/2/2024 0100 by Ad Chavarria RN  Transportation Anticipated: family or friend will provide  Patient/Family Anticipated Services at Transition: home health care  Patient/Family Anticipates Transition to: home with help/services  Equipment Currently Used at Home: walker, rolling     Problem: Skin Injury Risk Increased  Goal: Skin Health and Integrity  Outcome: Not Progressing  Intervention: Plan: Nurse Driven Intervention: Moisture Management  Recent Flowsheet Documentation  Taken 4/2/2024 0058 by Ad Chavarria RN  Moisture Interventions: Incontinence pad  Intervention: Optimize Skin Protection  Recent Flowsheet Documentation  Taken 4/2/2024 0058 by Ad Chavarria RN  Head of Bed (HOB) Positioning: HOB at 30-45 degrees     Problem: Intestinal Obstruction  Goal: Optimal Bowel Function  Outcome: Not Progressing  Intervention: Promote Bowel Function  Recent Flowsheet Documentation  Taken 4/2/2024 0058 by Ad Chavarria RN  Body Position: position changed independently  Head of Bed (HOB) Positioning: HOB at 30-45 degrees  Positioning/Transfer Devices:   pillows   in use  Goal: Fluid and Electrolyte Balance  Outcome: Not Progressing  Goal: Absence of Infection Signs and Symptoms  Outcome: Not  "Progressing  Intervention: Prevent or Manage Infection  Recent Flowsheet Documentation  Taken 4/2/2024 0058 by Ad Chavarria RN  Isolation Precautions: enteric precautions maintained  Goal: Optimize Nutrition Status  Outcome: Not Progressing  Goal: Optimal Pain Control and Function  Outcome: Not Progressing   Goal Outcome Evaluation:      Plan of Care Reviewed With: patient    Overall Patient Progress: no changeOverall Patient Progress: no change    Outcome Evaluation: Colorectal consult; NPO mainatained.    Pt is alert and oriented x 4. PRN Tylenol administered for headache. Pt denies any shortness of breath and on room air.     IV Zosyn administered.Q4rs BS (156. 104).    No BM on this shift; stool sample yet to be collected. Pt ambulates 1 GB+W.    NPO maintained. Enteric precaution maintained. Ongoing monitoring.    Plan: Colorectal surgery consult.    /47 (BP Location: Left arm, Patient Position: Semi-Liao's, Cuff Size: Adult Regular)   Pulse 54   Temp 98.3  F (36.8  C) (Oral)   Resp 16   Ht 1.727 m (5' 8\")   Wt 111.3 kg (245 lb 4.8 oz)   LMP  (LMP Unknown)   SpO2 98%   BMI 37.30 kg/m       "

## 2024-04-02 NOTE — H&P
[facility]  Internal Medicine  History and Physical      Patient Name: Savanna Rehman MRN# [id number]   Age: 83 year old YOB: 1940     Date of Admission:4/1/2024    Primary care provider: Taylor Payan  Date of Service: 4/1/2024         Assessment and Plan:   Savanna Rehman is a 83 year old female with a history of chronic A-fib on Coumadin, hypertension, heart failure with preserved ejection fraction, type 2 diabetes mellitus, CRISTIANA with noncompliance, FERREIRA who presents to the ED today due to nausea, emesis and lower abdominal pain with 3 loose stools.    Acute Sigmoid Diverticulitis with possible adjacent 2.5 x 1.3 x 2.9 cm abscess  Pt presents with nausea, emesis, lower abdominal pain and watery stools.  Hx of chronic diarrhea on Imodium at baseline and was recently on Keflex for a UTI.  Pt is afebrile with normal wbc.  CT abd/pelvis reveals changes worrisome for sigmoid diverticulitis with possible adjacent 2.5 x 1.3 x 2.9 cm abscess  - follow up pending UA, cdiff and enteric panel  - continue IV Zosyn  - Colorectal Surgery consult      Chronic Atrial Fibrillation  Coagulopathy due to Warfarin  INR 2.12.  appears she is on warfarin pta.  Awaiting med rec to confirm.  - pharmacy consulted for warfarin dosing    HTN  Chronic Diastolic CHF  Has peripheral edema on exam, no shortness of breath.    - hold pta diuretics for now due to npo  - monitor daily weights, I/O    CKD  Creatinine 1.18; at baseline.  - monitor    DM type 2  Hgb A1C 6.1 (2/2024)  - hold Glipizide  - start ISS protocol    Hx FERREIRA  Chronic Thrombocytopenia  LFTs wnl.  Plt 139.  CT reveals Cirrhotic changes of the liver with associated secondary findings of portal venous hypertension which would include varicosities and splenomegaly. The portal veins are patent.   - follow up with GI    Depression  - continue Lexapro    CRISTIANA, noncompliant with cpap    Right Buttocks Fluid Collection  Incidentally noted on CT imaging  is a nonspecific fluid collection in the deep subcutaneous fat over the right buttocks region measuring 7.8 x 3.0 x 5.9 cm.  Likely due to hematoma as pt reports this has been present for months following a prior fall.  - monitor    CODE: full  Diet/IVF: npo after midnight for any possible procedures  DVT ppx: warfarin    Silviabelkis Rogelai JOHNSON PA-C  Physician Assistant   Hospitalist Service    Awaiting formal pharmacy med rec to confirm home medications.             Chief Complaint:   Abdominal pain, nausea, emesis, diarrhea         HPI:   83 year old female with a history of chronic A-fib on Coumadin, hypertension, heart failure with preserved ejection fraction, type 2 diabetes mellitus, CRISTIANA with noncompliance, FERREIRA who presents to the ED today due to nausea, emesis and lower abdominal pain with 3 loose stools.      Patient was seen in the ED on 3/23/24 with abdominal pain and was found to have a UTI.  No abdominal imaging was obtained at that time.  She was treated with Keflex and urine culture grew 2 stains of Klebsiella.    Patient reports she has chronic diarrhea at baseline and takes Imodium daily.  Today after eating an orange and tomato soup, she had nausea and emesis.  She has had 3 watery loose stools today with diffuse lower abdominal pain.  She has a hx of CHF and reports she is compliant with her diuretics and notes peripheral edema.         Past Medical History:     Past Medical History:   Diagnosis Date    Acute encephalopathy 09/21/2023    Anemia     Iron Deficiency anemia    Atrial fibrillation (H)     CAD (coronary artery disease)     non-obstructive    Chronic pain     neck, low back, legs    Congestive heart failure (H)     Degenerative disk disease     Diabetes (H)     Fibromyalgia     Gastro-oesophageal reflux disease     Gout     Hiatal hernia     Mumps     Neuropathy     CRISTIANA (obstructive sleep apnea) - CPAP     Palpitations     Pernicious anemia     Sleep apnea     uses CPAP.    Urinary  incontinence     Vitamin D deficiency           Past Surgical History:     Past Surgical History:   Procedure Laterality Date    APPENDECTOMY      CHOLECYSTECTOMY      COLONOSCOPY  3/15/2011    COLONOSCOPY N/A 3/15/2023    Procedure: COLONOSCOPY, WITH POLYPECTOMY AND BIOPSY;  Surgeon: Maci Coronel MD;  Location:  GI    COLONOSCOPY N/A 3/15/2023    Procedure: COLONOSCOPY, FLEXIBLE, WITH LESION REMOVAL USING SNARE;  Surgeon: Maci Coronel MD;  Location:  GI    CORONARY ANGIOGRAPHY ADULT ORDER      CYSTOSCOPY, BIOPSY BLADDER, COMBINED N/A 2020    Procedure: CYSTOSCOPY, WITH BLADDER BIOPSY;  Surgeon: Deisy Wilson MD;  Location: [facility] OR    HEART CATH LEFT HEART CATH  16    medication management    HYSTERECTOMY TOTAL ABDOMINAL      Knee replacement NOS Left     LAPAROSCOPIC NISSEN FUNDOPLICATION N/A 2015    Procedure: LAPAROSCOPIC NISSEN FUNDOPLICATION;  Surgeon: Armando Ansari MD;  Location:  OR    TONSILLECTOMY      TRANSPOSITION ULNAR NERVE (ELBOW)            Social History:     Social History     Socioeconomic History    Marital status:      Spouse name: Not on file    Number of children: Not on file    Years of education: Not on file    Highest education level: Not on file   Occupational History    Not on file   Tobacco Use    Smoking status: Former     Packs/day: 0.50     Years: 50.00     Additional pack years: 0.00     Total pack years: 25.00     Types: Cigarettes     Quit date: 2014     Years since quittin.5     Passive exposure: Past    Smokeless tobacco: Never   Vaping Use    Vaping Use: Never used   Substance and Sexual Activity    Alcohol use: Yes     Comment: couple a month    Drug use: Never    Sexual activity: Not Currently   Other Topics Concern    Parent/sibling w/ CABG, MI or angioplasty before 65F 55M? Not Asked   Social History Narrative    Not on file     Social Determinants of Health     Financial Resource Strain: Low Risk  (2024)     Financial Resource Strain     Within the past 12 months, have you or your family members you live with been unable to get utilities (heat, electricity) when it was really needed?: No   Food Insecurity: Low Risk  (2/1/2024)    Food Insecurity     Within the past 12 months, did you worry that your food would run out before you got money to buy more?: No     Within the past 12 months, did the food you bought just not last and you didn t have money to get more?: No   Transportation Needs: High Risk (2/1/2024)    Transportation Needs     Within the past 12 months, has lack of transportation kept you from medical appointments, getting your medicines, non-medical meetings or appointments, work, or from getting things that you need?: Yes   Physical Activity: Not on file   Stress: Stress Concern Present (1/27/2022)    [employer] - Occupational Stress Questionnaire     Feeling of Stress : Very much   Social Connections: Not on file   Interpersonal Safety: Low Risk  (9/21/2023)    Interpersonal Safety     Do you feel physically and emotionally safe where you currently live?: Yes     Within the past 12 months, have you been hit, slapped, kicked or otherwise physically hurt by someone?: No     Within the past 12 months, have you been humiliated or emotionally abused in other ways by your partner or ex-partner?: No   Housing Stability: Low Risk  (2/1/2024)    Housing Stability     Do you have housing? : Yes     Are you worried about losing your housing?: No          Family History:     Family History   Problem Relation Age of Onset    Alzheimer Disease Mother     Lung Cancer Father     No Known Problems Brother     No Known Problems Brother     No Known Problems Brother     Unknown/Adopted No family hx of           Allergies:      Allergies   Allergen Reactions    Augmented Betamethasone Diprop [Betamethasone] Other (See Comments)     Severe yeast infection    Petroleum Jelly [Petrolatum] Anaphylaxis      Rash and swelling    Shellfish-Derived Products Anaphylaxis     Tongue swelling    Aspirin Swelling     tiongue swelling    Bacitracin      Rash swelling    Bactrim [Sulfamethoxazole-Trimethoprim] Dizziness    Coumadin [Warfarin] Swelling     Leg swelling    Darvon [Propoxyphene] Swelling     Throat closes    Dilaudid [Hydromorphone]      No side effects from fentanyl June 2023    Levaquin [Levofloxacin] Swelling     Tongue swelling    Lidocaine     Morphine Unknown     [facility]    Neomycin Swelling     rash    Neosporin [Neomycin-Polymyxin-Gramicidin] Swelling     rash    Nitrofurantoin      SOB, GI upset,    Oxycodone      Severe itching    Percocet [Oxycodone-Acetaminophen] Unknown    Percodan [Oxycodone-Aspirin]      Severe itching    Polymyxin B     Pramoxine     Tramadol     Vicodin [Hydrocodone-Acetaminophen]      Severe itching      Xarelto [Rivaroxaban]     Adhesive Tape Rash     Band aids     Codeine Rash    Hydrocortisone Rash and Swelling    Other Environmental Allergy Rash     Adhesive tape   Band aids           Medications:     Prior to Admission medications    Medication Sig Last Dose Taking? Auth Provider Long Term End Date   acetaminophen (TYLENOL) 500 MG tablet Take 1,000 mg by mouth every 6 hours as needed for mild pain   Unknown, Entered By History     Cholecalciferol (VITAMIN D-3) 125 MCG (5000 UT) TABS Take 5,000 Units by mouth daily   Patti Suárez MD     escitalopram (LEXAPRO) 5 MG tablet Take 1 tablet by mouth once daily   Taylor Payan MD Yes    furosemide (LASIX) 20 MG tablet Take 1 tablet (20 mg) by mouth 2 times daily for 2 days   OreillyJennifer PA-C Yes 2/26/24   furosemide (LASIX) 20 MG tablet Take 1 tablet (20 mg) by mouth daily   OreillyJennifer brasher PA-C Yes    glipiZIDE (GLUCOTROL XL) 2.5 MG 24 hr tablet Take 1 tablet by mouth twice daily   Taylor Payan MD Yes    indapamide (LOZOL) 2.5 MG tablet Take 1 tablet (2.5 mg) by mouth  "every morning   Taylor Payan MD Yes    loperamide (IMODIUM A-D) 2 MG tablet Take 2 mg by mouth 2 times daily   Reported, Patient     loperamide (IMODIUM) 2 MG capsule TAKE 1 CAPSULE BY MOUTH 4 TIMES DAILY AS NEEDED FOR DIARRHEA   Taylor Payan MD     warfarin ANTICOAGULANT (COUMADIN) 2.5 MG tablet Take by mouth daily 3.75mg = Mon, 2.5mg=ROW   Unknown, Entered By History            Review of Systems:   A complete ROS was performed and is negative other than what is stated in the HPI.       Physical Exam:   Blood pressure (!) 147/76, pulse 79, temperature (!) 96.7  F (35.9  C), temperature source Temporal, resp. rate 18, height 1.727 m (5' 8\"), weight 112 kg (246 lb 14.6 oz), SpO2 96%, not currently breastfeeding.  General: Alert, interactive, NAD, lying in bed eating yogurt  HEENT: AT/NC, sclera anicteric, PERRL, EOMI, MMM  Neck: Supple, no JVD  Chest/Resp: clear to auscultation bilaterally, no crackles or wheezes  Heart/CV: irregular rate and rhythm, no murmur  Abdomen/GI: Soft, nontender, nondistended. +BS.  No rebound or guarding.  Extremities/MSK: 1+ BLE edema.  Right buttock with firm, non tender area.  No erythema or warmth.  Skin: Warm and dry  Neuro: Alert & oriented x 3, no focal deficits, moves all extremities equally         Labs:   ROUTINE ICU LABS (Last four results)  CMP  Recent Labs   Lab 04/01/24  1625      POTASSIUM 3.8   CHLORIDE 97*   CO2 32*   ANIONGAP 9   GLC 99   BUN 20.6   CR 1.18*   GFRESTIMATED 46*   SAUL 9.2   PROTTOTAL 7.9   ALBUMIN 3.9   BILITOTAL 1.2   ALKPHOS 118   AST 44   ALT 24     CBC  Recent Labs   Lab 04/01/24  1625   WBC 4.5   RBC 4.18   HGB 12.9   HCT 38.5   MCV 92   MCH 30.9   MCHC 33.5   RDW 14.0   *     INR  Recent Labs   Lab 04/01/24  1625 03/26/24  1306   INR 2.12* 2.5*     Arterial Blood GasNo lab results found in last 7 days.       Imaging/Procedures:     Results for orders placed or performed during the hospital encounter of 04/01/24   CT Abdomen Pelvis w " Contrast    Narrative    EXAM: CT ABDOMEN PELVIS W CONTRAST  LOCATION: [facility]  DATE: 4/1/2024    INDICATION: Abdominal pain, vomiting, diarrhea.  COMPARISON: CT 03/13/2022  TECHNIQUE: CT scan of the abdomen and pelvis was performed following injection of IV contrast. Multiplanar reformats were obtained. Dose reduction techniques were used.  CONTRAST: 100mL Isovue 370    FINDINGS:   LOWER CHEST: Mild to moderate coronary artery calcification.    HEPATOBILIARY: The gallbladder is surgically absent. Mild cirrhotic configuration of the liver. The portal veins are patent and the bile ducts are normal in caliber. There are secondary findings of portal venous hypertension which would include scattered   varicosities and splenomegaly.    PANCREAS: Significant atrophy.    SPLEEN: Splenomegaly with the spleen measuring approximately 15 cm in greatest longitudinal dimension.    ADRENAL GLANDS: Normal.    KIDNEYS/BLADDER: Normal.    BOWEL: There is thickening seen involving the proximal and mid portions of the sigmoid colon with scattered diverticuli. The above findings would be worrisome for diverticulitis. Just lateral to the mid sigmoid colon, there is an abnormal fluid   collection measuring approximately 2.5 x 1.3 x 2.9 cm and this is nonspecific, but could represent an abscess without associated gas. On CT 03/13/2022, there was a tiny abnormality at this same location; however, this only measured 1.2 x 0.6 x 1.2 cm.   The appendix is not seen and is likely surgically absent or atrophic. Prior postoperative changes near the EG junction.    LYMPH NODES: Mildly prominent right groin lymph node measuring 1.6 x 1.1 cm and differential would include an abscess without associated gas or a seroma/hematoma. This is new since 03/13/2022.    VASCULATURE: Moderate to marked calcification with no aneurysmal dilatation.    PELVIC ORGANS: Surgically absent.    MUSCULOSKELETAL: There is an abnormal  well-circumscribed fluid collection with no associated gas seen involving the subcutaneous fat of the mid right buttocks region and this measures approximately 7.8 x 3.0 x 5.9 cm       Impression    IMPRESSION:   1.  Changes worrisome for sigmoid diverticulitis with possible adjacent 2.5 x 1.3 x 2.9 cm abscess.    2.  Nonspecific fluid collection in the deep subcutaneous fat over the right buttocks region measuring 7.8 x 3.0 x 5.9 cm and differential would include an abscess without associated gas or seroma/hematoma.    3.  Cirrhotic changes of the liver with associated secondary findings of portal venous hypertension which would include varicosities and splenomegaly. The portal veins are patent.    4.  Mildly prominent right groin lymph node, this may be reactive/inflammatory in nature.     *Note: Due to a large number of results and/or encounters for the requested time period, some results have not been displayed. A complete set of results can be found in Results Review.

## 2024-04-03 LAB
ANION GAP SERPL CALCULATED.3IONS-SCNC: 13 MMOL/L (ref 7–15)
BASOPHILS # BLD AUTO: 0 10E3/UL (ref 0–0.2)
BASOPHILS NFR BLD AUTO: 1 %
BUN SERPL-MCNC: 16.4 MG/DL (ref 8–23)
CALCIUM SERPL-MCNC: 8.6 MG/DL (ref 8.8–10.2)
CHLORIDE SERPL-SCNC: 101 MMOL/L (ref 98–107)
CREAT SERPL-MCNC: 1.16 MG/DL (ref 0.51–0.95)
DEPRECATED HCO3 PLAS-SCNC: 25 MMOL/L (ref 22–29)
EGFRCR SERPLBLD CKD-EPI 2021: 47 ML/MIN/1.73M2
EOSINOPHIL # BLD AUTO: 0.3 10E3/UL (ref 0–0.7)
EOSINOPHIL NFR BLD AUTO: 8 %
ERYTHROCYTE [DISTWIDTH] IN BLOOD BY AUTOMATED COUNT: 14 % (ref 10–15)
GLUCOSE BLDC GLUCOMTR-MCNC: 106 MG/DL (ref 70–99)
GLUCOSE BLDC GLUCOMTR-MCNC: 130 MG/DL (ref 70–99)
GLUCOSE BLDC GLUCOMTR-MCNC: 155 MG/DL (ref 70–99)
GLUCOSE BLDC GLUCOMTR-MCNC: 82 MG/DL (ref 70–99)
GLUCOSE BLDC GLUCOMTR-MCNC: 87 MG/DL (ref 70–99)
GLUCOSE BLDC GLUCOMTR-MCNC: 94 MG/DL (ref 70–99)
GLUCOSE SERPL-MCNC: 84 MG/DL (ref 70–99)
HCT VFR BLD AUTO: 35.3 % (ref 35–47)
HGB BLD-MCNC: 11.6 G/DL (ref 11.7–15.7)
IMM GRANULOCYTES # BLD: 0 10E3/UL
IMM GRANULOCYTES NFR BLD: 0 %
INR PPP: 1.99 (ref 0.85–1.15)
LYMPHOCYTES # BLD AUTO: 1.1 10E3/UL (ref 0.8–5.3)
LYMPHOCYTES NFR BLD AUTO: 32 %
MCH RBC QN AUTO: 30.5 PG (ref 26.5–33)
MCHC RBC AUTO-ENTMCNC: 32.9 G/DL (ref 31.5–36.5)
MCV RBC AUTO: 93 FL (ref 78–100)
MONOCYTES # BLD AUTO: 0.6 10E3/UL (ref 0–1.3)
MONOCYTES NFR BLD AUTO: 17 %
NEUTROPHILS # BLD AUTO: 1.4 10E3/UL (ref 1.6–8.3)
NEUTROPHILS NFR BLD AUTO: 42 %
NRBC # BLD AUTO: 0 10E3/UL
NRBC BLD AUTO-RTO: 0 /100
PLATELET # BLD AUTO: 105 10E3/UL (ref 150–450)
POTASSIUM SERPL-SCNC: 4 MMOL/L (ref 3.4–5.3)
RBC # BLD AUTO: 3.8 10E6/UL (ref 3.8–5.2)
SODIUM SERPL-SCNC: 139 MMOL/L (ref 135–145)
WBC # BLD AUTO: 3.3 10E3/UL (ref 4–11)

## 2024-04-03 PROCEDURE — 80048 BASIC METABOLIC PNL TOTAL CA: CPT | Performed by: INTERNAL MEDICINE

## 2024-04-03 PROCEDURE — 85610 PROTHROMBIN TIME: CPT | Performed by: INTERNAL MEDICINE

## 2024-04-03 PROCEDURE — 36415 COLL VENOUS BLD VENIPUNCTURE: CPT | Performed by: INTERNAL MEDICINE

## 2024-04-03 PROCEDURE — 120N000001 HC R&B MED SURG/OB

## 2024-04-03 PROCEDURE — 85025 COMPLETE CBC W/AUTO DIFF WBC: CPT | Performed by: INTERNAL MEDICINE

## 2024-04-03 PROCEDURE — 250N000013 HC RX MED GY IP 250 OP 250 PS 637: Performed by: INTERNAL MEDICINE

## 2024-04-03 PROCEDURE — 250N000013 HC RX MED GY IP 250 OP 250 PS 637: Performed by: STUDENT IN AN ORGANIZED HEALTH CARE EDUCATION/TRAINING PROGRAM

## 2024-04-03 PROCEDURE — 99233 SBSQ HOSP IP/OBS HIGH 50: CPT | Performed by: INTERNAL MEDICINE

## 2024-04-03 PROCEDURE — 258N000003 HC RX IP 258 OP 636: Performed by: INTERNAL MEDICINE

## 2024-04-03 PROCEDURE — 250N000011 HC RX IP 250 OP 636: Performed by: PHYSICIAN ASSISTANT

## 2024-04-03 RX ORDER — DEXTROSE MONOHYDRATE 25 G/50ML
25-50 INJECTION, SOLUTION INTRAVENOUS
Status: DISCONTINUED | OUTPATIENT
Start: 2024-04-03 | End: 2024-04-04 | Stop reason: HOSPADM

## 2024-04-03 RX ORDER — NICOTINE POLACRILEX 4 MG
15-30 LOZENGE BUCCAL
Status: DISCONTINUED | OUTPATIENT
Start: 2024-04-03 | End: 2024-04-04 | Stop reason: HOSPADM

## 2024-04-03 RX ORDER — WARFARIN SODIUM 4 MG/1
4 TABLET ORAL
Status: COMPLETED | OUTPATIENT
Start: 2024-04-03 | End: 2024-04-03

## 2024-04-03 RX ORDER — FUROSEMIDE 20 MG
20 TABLET ORAL DAILY
Status: DISCONTINUED | OUTPATIENT
Start: 2024-04-03 | End: 2024-04-04 | Stop reason: HOSPADM

## 2024-04-03 RX ADMIN — BUTALBITAL, ASPIRIN, AND CAFFEINE 1 CAPSULE: 50; 325; 40 CAPSULE ORAL at 02:32

## 2024-04-03 RX ADMIN — PIPERACILLIN AND TAZOBACTAM 3.38 G: 3; .375 INJECTION, POWDER, FOR SOLUTION INTRAVENOUS at 16:28

## 2024-04-03 RX ADMIN — BUTALBITAL, ASPIRIN, AND CAFFEINE 1 CAPSULE: 50; 325; 40 CAPSULE ORAL at 18:00

## 2024-04-03 RX ADMIN — PIPERACILLIN AND TAZOBACTAM 3.38 G: 3; .375 INJECTION, POWDER, FOR SOLUTION INTRAVENOUS at 02:32

## 2024-04-03 RX ADMIN — BUTALBITAL, ASPIRIN, AND CAFFEINE 1 CAPSULE: 50; 325; 40 CAPSULE ORAL at 22:03

## 2024-04-03 RX ADMIN — BUTALBITAL, ASPIRIN, AND CAFFEINE 1 CAPSULE: 50; 325; 40 CAPSULE ORAL at 09:46

## 2024-04-03 RX ADMIN — PIPERACILLIN AND TAZOBACTAM 3.38 G: 3; .375 INJECTION, POWDER, FOR SOLUTION INTRAVENOUS at 22:04

## 2024-04-03 RX ADMIN — ESCITALOPRAM 5 MG: 5 TABLET, FILM COATED ORAL at 21:15

## 2024-04-03 RX ADMIN — WARFARIN SODIUM 4 MG: 4 TABLET ORAL at 18:00

## 2024-04-03 RX ADMIN — FUROSEMIDE 20 MG: 20 TABLET ORAL at 10:47

## 2024-04-03 RX ADMIN — PIPERACILLIN AND TAZOBACTAM 3.38 G: 3; .375 INJECTION, POWDER, FOR SOLUTION INTRAVENOUS at 09:46

## 2024-04-03 RX ADMIN — SODIUM CHLORIDE: 9 INJECTION, SOLUTION INTRAVENOUS at 05:29

## 2024-04-03 ASSESSMENT — ACTIVITIES OF DAILY LIVING (ADL)
ADLS_ACUITY_SCORE: 33
ADLS_ACUITY_SCORE: 34
ADLS_ACUITY_SCORE: 33

## 2024-04-03 NOTE — PLAN OF CARE
"Goal Outcome Evaluation:      Plan of Care Reviewed With: patient    Overall Patient Progress: no changeOverall Patient Progress: no change       VSS on room air. Up Ax1. BG Q4 89, 94, 87 . NPO to rest bowels. On IV zoysn. NS @50/hr. Will continue with plan of care.           Problem: Adult Inpatient Plan of Care  Goal: Plan of Care Review  Description: The Plan of Care Review/Shift note should be completed every shift.  The Outcome Evaluation is a brief statement about your assessment that the patient is improving, declining, or no change.  This information will be displayed automatically on your shift  note.  Outcome: Progressing  Flowsheets (Taken 4/3/2024 0439)  Plan of Care Reviewed With: patient  Overall Patient Progress: no change  Goal: Patient-Specific Goal (Individualized)  Description: You can add care plan individualizations to a care plan. Examples of Individualization might be:  \"Parent requests to be called daily at 9am for status\", \"I have a hard time hearing out of my right ear\", or \"Do not touch me to wake me up as it startles  me\".  Outcome: Progressing  Goal: Absence of Hospital-Acquired Illness or Injury  Outcome: Progressing  Intervention: Identify and Manage Fall Risk  Recent Flowsheet Documentation  Taken 4/2/2024 2101 by Angle Sinha RN  Safety Promotion/Fall Prevention:   activity supervised   clutter free environment maintained   increased rounding and observation   lighting adjusted   mobility aid in reach   nonskid shoes/slippers when out of bed   room near nurse's station   safety round/check completed  Intervention: Prevent Infection  Recent Flowsheet Documentation  Taken 4/2/2024 2101 by Angle Sinha RN  Infection Prevention:   single patient room provided   rest/sleep promoted   personal protective equipment utilized   hand hygiene promoted  Goal: Optimal Comfort and Wellbeing  Outcome: Progressing  Goal: Readiness for Transition of Care  Outcome: Progressing     Problem: " Skin Injury Risk Increased  Goal: Skin Health and Integrity  Outcome: Progressing     Problem: Intestinal Obstruction  Goal: Optimal Bowel Function  Outcome: Progressing  Goal: Fluid and Electrolyte Balance  Outcome: Progressing  Goal: Absence of Infection Signs and Symptoms  Outcome: Progressing  Goal: Optimize Nutrition Status  Outcome: Progressing  Goal: Optimal Pain Control and Function  Outcome: Progressing

## 2024-04-03 NOTE — PLAN OF CARE
"Vss A&OX4. C/o dizziness when she stands up. Pt states its because she has been npo. Orthostatics negative. BG stable. Diet advanced to clears. Denies abd pain unless there is pressure manually applied to her stomach. No bm. Tolerated diet. BG stable . Purwick placed after diuretic given. 400 uo.     Problem: Adult Inpatient Plan of Care  Goal: Plan of Care Review  Description: The Plan of Care Review/Shift note should be completed every shift.  The Outcome Evaluation is a brief statement about your assessment that the patient is improving, declining, or no change.  This information will be displayed automatically on your shift  note.  Outcome: Progressing  Goal: Patient-Specific Goal (Individualized)  Description: You can add care plan individualizations to a care plan. Examples of Individualization might be:  \"Parent requests to be called daily at 9am for status\", \"I have a hard time hearing out of my right ear\", or \"Do not touch me to wake me up as it startles  me\".  Outcome: Progressing  Goal: Absence of Hospital-Acquired Illness or Injury  Outcome: Progressing  Intervention: Identify and Manage Fall Risk  Recent Flowsheet Documentation  Taken 4/3/2024 0949 by Kelvin Yang RN  Safety Promotion/Fall Prevention:   activity supervised   assistive device/personal items within reach   clutter free environment maintained   nonskid shoes/slippers when out of bed   patient and family education   room organization consistent   safety round/check completed  Intervention: Prevent Skin Injury  Recent Flowsheet Documentation  Taken 4/3/2024 0949 by Kelvin Yang RN  Body Position: position changed independently  Device Skin Pressure Protection: absorbent pad utilized/changed  Goal: Optimal Comfort and Wellbeing  Outcome: Progressing  Intervention: Monitor Pain and Promote Comfort  Recent Flowsheet Documentation  Taken 4/3/2024 0946 by Kelvin Yang RN  Pain Management Interventions: medication (see " MAR)  Goal: Readiness for Transition of Care  Outcome: Progressing     Problem: Skin Injury Risk Increased  Goal: Skin Health and Integrity  Outcome: Progressing  Intervention: Plan: Nurse Driven Intervention: Moisture Management  Recent Flowsheet Documentation  Taken 4/3/2024 0949 by Kelvin Yang RN  Moisture Interventions: Incontinence pad  Intervention: Plan: Nurse Driven Intervention: Friction and Shear  Recent Flowsheet Documentation  Taken 4/3/2024 0949 by Kelvin Yang RN  Friction/Shear Interventions: HOB 30 degrees or less  Intervention: Optimize Skin Protection  Recent Flowsheet Documentation  Taken 4/3/2024 0949 by Kelvin Yang RN  Activity Management: activity adjusted per tolerance     Problem: Intestinal Obstruction  Goal: Optimal Bowel Function  Outcome: Progressing  Intervention: Promote Bowel Function  Recent Flowsheet Documentation  Taken 4/3/2024 0949 by Kelvin Yang RN  Body Position: position changed independently  Goal: Fluid and Electrolyte Balance  Outcome: Progressing  Intervention: Monitor and Manage Hypovolemia  Recent Flowsheet Documentation  Taken 4/3/2024 0949 by Kelvin Yang RN  Fluid/Electrolyte Management: fluids provided  Goal: Absence of Infection Signs and Symptoms  Outcome: Progressing  Intervention: Prevent or Manage Infection  Recent Flowsheet Documentation  Taken 4/3/2024 0949 by Kelvin Yang RN  Infection Management: aseptic technique maintained  Isolation Precautions: enteric precautions maintained  Goal: Optimize Nutrition Status  Outcome: Progressing  Goal: Optimal Pain Control and Function  Outcome: Progressing  Intervention: Prevent or Manage Pain  Recent Flowsheet Documentation  Taken 4/3/2024 0946 by Kelvin Yang RN  Pain Management Interventions: medication (see MAR)   Goal Outcome Evaluation:

## 2024-04-03 NOTE — PROGRESS NOTES
COLON & RECTAL SURGERY  PROGRESS NOTE    April 3, 2024    SUBJECTIVE:  Abdominal pain improving from yesterday. Received Florinal for headache overnight. Denies N/V. +flatus, no BM. WBC 3.3 from 4.6, Hgb 11.6 from 11.7. AVSS.    OBJECTIVE:  Temp:  [97.4  F (36.3  C)-98.2  F (36.8  C)] 98.2  F (36.8  C)  Pulse:  [54-71] 71  Resp:  [16-18] 18  BP: (120-144)/(44-57) 144/49  SpO2:  [90 %-95 %] 93 %    Intake/Output Summary (Last 24 hours) at 4/3/2024 1016  Last data filed at 4/2/2024 2215  Gross per 24 hour   Intake 1070 ml   Output --   Net 1070 ml       GENERAL:  Awake, alert, no acute distress, lying in bed  HEAD: Nomocephalic atraumatic  SCLERA: anicteric  EXTREMITIES: warm and well perfused  ABDOMEN:  Soft, non- tender, non-distended, no rebound or guarding, no peritoneal signs      LABS:  Lab Results   Component Value Date    WBC 3.3 04/03/2024    WBC 6.1 02/22/2021     Lab Results   Component Value Date    HGB 11.6 04/03/2024    HGB 14.7 02/22/2021     Lab Results   Component Value Date    HCT 35.3 04/03/2024    HCT 44.5 02/22/2021     Lab Results   Component Value Date     04/03/2024     02/22/2021     Last Basic Metabolic Panel:  Lab Results   Component Value Date     04/03/2024     02/22/2021      Lab Results   Component Value Date    POTASSIUM 4.0 04/03/2024    POTASSIUM 4.6 11/07/2022    POTASSIUM 4.2 02/22/2021     Lab Results   Component Value Date    CHLORIDE 101 04/03/2024    CHLORIDE 105 11/07/2022    CHLORIDE 103 02/22/2021     Lab Results   Component Value Date    SAUL 8.6 04/03/2024    SAUL 9.9 02/22/2021     Lab Results   Component Value Date    CO2 25 04/03/2024    CO2 29 11/07/2022    CO2 29 02/22/2021     Lab Results   Component Value Date    BUN 16.4 04/03/2024    BUN 17 11/07/2022    BUN 14 02/22/2021     Lab Results   Component Value Date    CR 1.16 04/03/2024    CR 0.97 02/22/2021     Lab Results   Component Value Date    GLC 82 04/03/2024    GLC 84 04/03/2024    GLC  135 11/07/2022     02/22/2021       ASSESSMENT/PLAN: Savanna Rehman is an 83-year-old woman with FERREIRA cirrhosis c/b portal hypertension, atrial fibrillation (on Coumadin), HTN, CHF, diabetes, and CRISTIANA who was admitted on 4/1/24 for N/V/abdominal pain, and diarrhea. CT scan revealed sigmoid diverticulitis with an approximately 3 cm adjacent versus intramural abscess.    - Advance diet to clear liquids  - Continue IV antibiotics  - Trend WBC, repeat tomorrow   - Encourage ambulation  - Pain management, minimize narcotics        For questions/paging, please contact the CRS office at [phone].    Landon Billings PA-C  [facility]  Phone: [phone]

## 2024-04-03 NOTE — PROGRESS NOTES
[facility] [place]    Medicine Progress Note - Hospitalist Service    Date of Admission:  4/1/2024    Assessment & Plan      Savanna Rehman is a 83 year old female with a history of chronic A-fib on Coumadin, hypertension, heart failure with preserved ejection fraction, type 2 diabetes mellitus, CRISTIANA with noncompliance, FERREIRA who presents to the ED today due to nausea, emesis and lower abdominal pain with 3 loose stools.     Acute Sigmoid Diverticulitis with possible adjacent 2.5 x 1.3 x 2.9 cm abscess  Pt presents with nausea, emesis, lower abdominal pain and watery stools.  Hx of chronic diarrhea on Imodium at baseline and was recently on Keflex for a UTI.  Pt is afebrile with normal wbc.  CT abd/pelvis reveals changes worrisome for sigmoid diverticulitis with possible adjacent 2.5 x 1.3 x 2.9 cm abscess  -  - continue IV Zosyn  - Colorectal Surgery consult    -Abdominal symptoms improved.  May advance to clear liquid diet.  -Appreciate continuous input from colorectal service  -Patient is hopeful to continue to improve and will try to avoid surgical approach.  She reiterated again that as of this time she is not inclined in pursuing any surgical intervention.  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  She also mention she is not keen on pursuing surgical approach.  -Will resume back her warfarin    HTN  Chronic Diastolic CHF  Has peripheral edema on exam, no shortness of breath.    - hold pta diuretics for now due to npo  - monitor daily weights, I/O     CKD  Creatinine 1.18; at baseline.  -Recent creatinine is 1.25 likely at baseline  -Stop IV fluids  -Patient is at risk for hypervolemia given her CKD and cirrhosis.  -Currently now on clear liquids.  Will resume her oral diuretics     DM type 2  Hgb A1C 6.1  "(2/2024)  - hold Glipizide  - start ISS protocol     Hx FERREIRA  Chronic Thrombocytopenia  LFTs wnl.  Plt 139.  CT reveals Cirrhotic changes of the liver with associated secondary findings of portal venous hypertension which would include varicosities and splenomegaly. The portal veins are patent.   - follow up with GI     Depression  - continue Lexapro     CRISTIANA, noncompliant with cpap     Right Buttocks Fluid Collection  Incidentally noted on CT imaging is a nonspecific fluid collection in the deep subcutaneous fat over the right buttocks region measuring 7.8 x 3.0 x 5.9 cm.  Likely due to hematoma as pt reports this has been present for months following a prior fall.  - monitor             Diet: Clear Liquid Diet    DVT Prophylaxis: Pneumatic Compression Devices,  Warfarin  Aguilar Catheter: Not present  Lines: None     Cardiac Monitoring: None  Code Status: Full Code      Clinically Significant Risk Factors              # Hypoalbuminemia: Lowest albumin = 3.4 g/dL at 4/2/2024  6:32 AM, will monitor as appropriate     # Thrombocytopenia: Lowest platelets = 105 in last 2 days, will monitor for bleeding    # Chronic heart failure with preserved ejection fraction: heart failure noted on problem list and last echo with EF >50%       # Obesity: Estimated body mass index is 37.28 kg/m  as calculated from the following:    Height as of this encounter: 1.727 m (5' 8\").    Weight as of this encounter: 111.2 kg (245 lb 3.2 oz)., PRESENT ON ADMISSION            Disposition Plan      Expected Discharge Date: Anticipating 1 to 2 days pending improvement with this diverticulitis and abscess      Destination: home with help/services              Jase Owen MD, MD  Hospitalist Service  [facility]  Securely message with [employer] (more info)  Text page via [email or web address] Paging/Directory   ______________________________________________________________________    Interval History   I continuing medicine service care " today.  Seen and examined.  Chart reviewed.  Case discussed with nursing service who was gracious to be present at bedside during my encounter  I met Ms. Corrales while she is laying comfortably in bed.   She appears to be in good spirits as she is feeling better with no worsening abdominal symptomatology such as nausea, vomiting or abdominal pain.  She mentioned that she is passing  flatus but no BM yet.  No mental status changes.  Remain afebrile.  Denies any shortness of breath.  Not requiring oxygen support.  Remain afebrile.        Physical Exam   Vital Signs: Temp: 98.2  F (36.8  C) Temp src: Oral BP: (!) 144/49 Pulse: 71   Resp: 18 SpO2: 93 % O2 Device: None (Room air)    Weight: 245 lbs 3.2 oz    HEENT; Atraumatic, normocephalic, pinkish conjuctiva, pupils bilateral reactive   Skin: warm and moist, no rashes  Lymphatics: no cervical or axillary lymphandenopathy  Lungs: equal chest expansion, clear to auscultation, no wheezes, no stridor, no crackles,   Heart: normal rate, normal rhythm, no rubs or gallops.   Abdomen: normal bowel sounds, no tenderness, no peritoneal signs, no guarding  Extremities: no deformities, no edema   Neuro; follow commands, alert and oriented x3, spontaneous speech, coherent, moves all extremities spontaneously  Psych; no hallucination, euthymic mood, not agitated      Medical Decision Making       50 MINUTES SPENT BY ME on the date of service doing chart review, history, exam, documentation & further activities per the note.  MANAGEMENT DISCUSSED with the following over the past 24 hours: Yes   NOTE(S)/MEDICAL RECORDS REVIEWED over the past 24 hours: Yes       Data     I have personally reviewed the following data over the past 24 hrs:    3.3 (L)  \   11.6 (L)   / 105 (L)     139 101 16.4 /  82   4.0 25 1.16 (H) \     INR:  1.99 (H) PTT:  N/A   D-dimer:  N/A Fibrinogen:  N/A       Imaging results reviewed over the past 24 hrs:   No results found for this or any previous visit (from  the past 24 hour(s)).

## 2024-04-04 ENCOUNTER — MEDICAL CORRESPONDENCE (OUTPATIENT)
Dept: HEALTH INFORMATION MANAGEMENT | Facility: CLINIC | Age: 84
End: 2024-04-04
Payer: COMMERCIAL

## 2024-04-04 ENCOUNTER — APPOINTMENT (OUTPATIENT)
Dept: CARDIOLOGY | Facility: CLINIC | Age: 84
DRG: 391 | End: 2024-04-04
Attending: INTERNAL MEDICINE
Payer: COMMERCIAL

## 2024-04-04 ENCOUNTER — TELEPHONE (OUTPATIENT)
Dept: INTERNAL MEDICINE | Facility: CLINIC | Age: 84
End: 2024-04-04

## 2024-04-04 ENCOUNTER — HOSPITAL ENCOUNTER (INPATIENT)
Facility: CLINIC | Age: 84
LOS: 4 days | Discharge: NURSING, REHAB OR LONG-TERM CARE | DRG: 243 | End: 2024-04-08
Attending: HOSPITALIST | Admitting: INTERNAL MEDICINE
Payer: COMMERCIAL

## 2024-04-04 VITALS
HEIGHT: 68 IN | OXYGEN SATURATION: 96 % | TEMPERATURE: 98 F | BODY MASS INDEX: 37.29 KG/M2 | HEART RATE: 61 BPM | RESPIRATION RATE: 19 BRPM | DIASTOLIC BLOOD PRESSURE: 44 MMHG | WEIGHT: 246.03 LBS | SYSTOLIC BLOOD PRESSURE: 129 MMHG

## 2024-04-04 DIAGNOSIS — I48.91 ATRIAL FIBRILLATION WITH SLOW VENTRICULAR RESPONSE (H): Primary | ICD-10-CM

## 2024-04-04 DIAGNOSIS — K57.20 COLONIC DIVERTICULAR ABSCESS: ICD-10-CM

## 2024-04-04 DIAGNOSIS — S30.0XXA HEMATOMA OF BUTTOCK: ICD-10-CM

## 2024-04-04 LAB
ANION GAP SERPL CALCULATED.3IONS-SCNC: 12 MMOL/L (ref 7–15)
BASOPHILS # BLD AUTO: 0 10E3/UL (ref 0–0.2)
BASOPHILS NFR BLD AUTO: 1 %
BUN SERPL-MCNC: 18.4 MG/DL (ref 8–23)
CALCIUM SERPL-MCNC: 8.5 MG/DL (ref 8.8–10.2)
CHLORIDE SERPL-SCNC: 100 MMOL/L (ref 98–107)
CREAT SERPL-MCNC: 1.65 MG/DL (ref 0.51–0.95)
DEPRECATED HCO3 PLAS-SCNC: 26 MMOL/L (ref 22–29)
EGFRCR SERPLBLD CKD-EPI 2021: 31 ML/MIN/1.73M2
EOSINOPHIL # BLD AUTO: 0.3 10E3/UL (ref 0–0.7)
EOSINOPHIL NFR BLD AUTO: 8 %
ERYTHROCYTE [DISTWIDTH] IN BLOOD BY AUTOMATED COUNT: 13.7 % (ref 10–15)
GLUCOSE BLDC GLUCOMTR-MCNC: 101 MG/DL (ref 70–99)
GLUCOSE BLDC GLUCOMTR-MCNC: 102 MG/DL (ref 70–99)
GLUCOSE BLDC GLUCOMTR-MCNC: 103 MG/DL (ref 70–99)
GLUCOSE BLDC GLUCOMTR-MCNC: 139 MG/DL (ref 70–99)
GLUCOSE BLDC GLUCOMTR-MCNC: 92 MG/DL (ref 70–99)
GLUCOSE BLDC GLUCOMTR-MCNC: 93 MG/DL (ref 70–99)
GLUCOSE SERPL-MCNC: 102 MG/DL (ref 70–99)
HCT VFR BLD AUTO: 35.9 % (ref 35–47)
HGB BLD-MCNC: 12.1 G/DL (ref 11.7–15.7)
IMM GRANULOCYTES # BLD: 0 10E3/UL
IMM GRANULOCYTES NFR BLD: 0 %
INR PPP: 2.55 (ref 0.85–1.15)
LVEF ECHO: NORMAL
LYMPHOCYTES # BLD AUTO: 1.3 10E3/UL (ref 0.8–5.3)
LYMPHOCYTES NFR BLD AUTO: 36 %
MAGNESIUM SERPL-MCNC: 1.8 MG/DL (ref 1.7–2.3)
MCH RBC QN AUTO: 30.7 PG (ref 26.5–33)
MCHC RBC AUTO-ENTMCNC: 33.7 G/DL (ref 31.5–36.5)
MCV RBC AUTO: 91 FL (ref 78–100)
MONOCYTES # BLD AUTO: 0.6 10E3/UL (ref 0–1.3)
MONOCYTES NFR BLD AUTO: 18 %
NEUTROPHILS # BLD AUTO: 1.3 10E3/UL (ref 1.6–8.3)
NEUTROPHILS NFR BLD AUTO: 37 %
NRBC # BLD AUTO: 0 10E3/UL
NRBC BLD AUTO-RTO: 0 /100
PHOSPHATE SERPL-MCNC: 2.8 MG/DL (ref 2.5–4.5)
PLATELET # BLD AUTO: 95 10E3/UL (ref 150–450)
POTASSIUM SERPL-SCNC: 4 MMOL/L (ref 3.4–5.3)
POTASSIUM SERPL-SCNC: 4.1 MMOL/L (ref 3.4–5.3)
RBC # BLD AUTO: 3.94 10E6/UL (ref 3.8–5.2)
SODIUM SERPL-SCNC: 138 MMOL/L (ref 135–145)
TSH SERPL DL<=0.005 MIU/L-ACNC: 2.98 UIU/ML (ref 0.3–4.2)
WBC # BLD AUTO: 3.6 10E3/UL (ref 4–11)

## 2024-04-04 PROCEDURE — 250N000013 HC RX MED GY IP 250 OP 250 PS 637: Performed by: STUDENT IN AN ORGANIZED HEALTH CARE EDUCATION/TRAINING PROGRAM

## 2024-04-04 PROCEDURE — 258N000003 HC RX IP 258 OP 636: Performed by: INTERNAL MEDICINE

## 2024-04-04 PROCEDURE — 210N000002 HC R&B HEART CARE

## 2024-04-04 PROCEDURE — 84100 ASSAY OF PHOSPHORUS: CPT | Performed by: INTERNAL MEDICINE

## 2024-04-04 PROCEDURE — 93306 TTE W/DOPPLER COMPLETE: CPT

## 2024-04-04 PROCEDURE — 85025 COMPLETE CBC W/AUTO DIFF WBC: CPT | Performed by: INTERNAL MEDICINE

## 2024-04-04 PROCEDURE — 84443 ASSAY THYROID STIM HORMONE: CPT | Performed by: INTERNAL MEDICINE

## 2024-04-04 PROCEDURE — 250N000011 HC RX IP 250 OP 636: Performed by: INTERNAL MEDICINE

## 2024-04-04 PROCEDURE — 99239 HOSP IP/OBS DSCHRG MGMT >30: CPT | Performed by: INTERNAL MEDICINE

## 2024-04-04 PROCEDURE — 83735 ASSAY OF MAGNESIUM: CPT | Performed by: INTERNAL MEDICINE

## 2024-04-04 PROCEDURE — 250N000011 HC RX IP 250 OP 636: Performed by: PHYSICIAN ASSISTANT

## 2024-04-04 PROCEDURE — 93306 TTE W/DOPPLER COMPLETE: CPT | Mod: 26 | Performed by: INTERNAL MEDICINE

## 2024-04-04 PROCEDURE — 250N000013 HC RX MED GY IP 250 OP 250 PS 637: Performed by: INTERNAL MEDICINE

## 2024-04-04 PROCEDURE — 85610 PROTHROMBIN TIME: CPT | Performed by: INTERNAL MEDICINE

## 2024-04-04 PROCEDURE — 36415 COLL VENOUS BLD VENIPUNCTURE: CPT | Performed by: INTERNAL MEDICINE

## 2024-04-04 PROCEDURE — 82962 GLUCOSE BLOOD TEST: CPT

## 2024-04-04 PROCEDURE — 80048 BASIC METABOLIC PNL TOTAL CA: CPT | Performed by: INTERNAL MEDICINE

## 2024-04-04 PROCEDURE — 93005 ELECTROCARDIOGRAM TRACING: CPT

## 2024-04-04 PROCEDURE — 99222 1ST HOSP IP/OBS MODERATE 55: CPT | Mod: 25 | Performed by: INTERNAL MEDICINE

## 2024-04-04 PROCEDURE — 84132 ASSAY OF SERUM POTASSIUM: CPT | Performed by: INTERNAL MEDICINE

## 2024-04-04 PROCEDURE — 99223 1ST HOSP IP/OBS HIGH 75: CPT | Performed by: INTERNAL MEDICINE

## 2024-04-04 RX ORDER — WARFARIN SODIUM 3 MG/1
3 TABLET ORAL
Status: COMPLETED | OUTPATIENT
Start: 2024-04-04 | End: 2024-04-04

## 2024-04-04 RX ORDER — FAMOTIDINE 20 MG/1
20 TABLET, FILM COATED ORAL DAILY
Status: DISCONTINUED | OUTPATIENT
Start: 2024-04-04 | End: 2024-04-08 | Stop reason: HOSPADM

## 2024-04-04 RX ORDER — ONDANSETRON 4 MG/1
4 TABLET, ORALLY DISINTEGRATING ORAL EVERY 6 HOURS PRN
Status: DISCONTINUED | OUTPATIENT
Start: 2024-04-04 | End: 2024-04-08 | Stop reason: HOSPADM

## 2024-04-04 RX ORDER — HYDRALAZINE HYDROCHLORIDE 10 MG/1
10 TABLET, FILM COATED ORAL EVERY 4 HOURS PRN
Status: DISCONTINUED | OUTPATIENT
Start: 2024-04-04 | End: 2024-04-08 | Stop reason: HOSPADM

## 2024-04-04 RX ORDER — PROCHLORPERAZINE 25 MG
12.5 SUPPOSITORY, RECTAL RECTAL EVERY 12 HOURS PRN
Status: DISCONTINUED | OUTPATIENT
Start: 2024-04-04 | End: 2024-04-08 | Stop reason: HOSPADM

## 2024-04-04 RX ORDER — PIPERACILLIN SODIUM, TAZOBACTAM SODIUM 2; .25 G/10ML; G/10ML
2.25 INJECTION, POWDER, LYOPHILIZED, FOR SOLUTION INTRAVENOUS EVERY 6 HOURS
Status: DISCONTINUED | OUTPATIENT
Start: 2024-04-04 | End: 2024-04-07

## 2024-04-04 RX ORDER — ONDANSETRON 2 MG/ML
4 INJECTION INTRAMUSCULAR; INTRAVENOUS EVERY 6 HOURS PRN
Status: DISCONTINUED | OUTPATIENT
Start: 2024-04-04 | End: 2024-04-08 | Stop reason: HOSPADM

## 2024-04-04 RX ORDER — MECLIZINE HCL 12.5 MG 12.5 MG/1
25 TABLET ORAL 3 TIMES DAILY PRN
Status: DISCONTINUED | OUTPATIENT
Start: 2024-04-04 | End: 2024-04-04 | Stop reason: HOSPADM

## 2024-04-04 RX ORDER — PROCHLORPERAZINE MALEATE 5 MG
5 TABLET ORAL EVERY 6 HOURS PRN
Status: DISCONTINUED | OUTPATIENT
Start: 2024-04-04 | End: 2024-04-08 | Stop reason: HOSPADM

## 2024-04-04 RX ORDER — PIPERACILLIN SODIUM, TAZOBACTAM SODIUM 2; .25 G/10ML; G/10ML
2.25 INJECTION, POWDER, LYOPHILIZED, FOR SOLUTION INTRAVENOUS EVERY 6 HOURS
Status: DISCONTINUED | OUTPATIENT
Start: 2024-04-04 | End: 2024-04-04 | Stop reason: HOSPADM

## 2024-04-04 RX ORDER — ESCITALOPRAM OXALATE 5 MG/1
5 TABLET ORAL AT BEDTIME
Status: DISCONTINUED | OUTPATIENT
Start: 2024-04-04 | End: 2024-04-08 | Stop reason: HOSPADM

## 2024-04-04 RX ORDER — HYDRALAZINE HYDROCHLORIDE 20 MG/ML
10 INJECTION INTRAMUSCULAR; INTRAVENOUS EVERY 4 HOURS PRN
Status: DISCONTINUED | OUTPATIENT
Start: 2024-04-04 | End: 2024-04-08 | Stop reason: HOSPADM

## 2024-04-04 RX ORDER — ACETAMINOPHEN 650 MG/1
650 SUPPOSITORY RECTAL EVERY 4 HOURS PRN
Status: DISCONTINUED | OUTPATIENT
Start: 2024-04-04 | End: 2024-04-06

## 2024-04-04 RX ORDER — SODIUM CHLORIDE, SODIUM LACTATE, POTASSIUM CHLORIDE, CALCIUM CHLORIDE 600; 310; 30; 20 MG/100ML; MG/100ML; MG/100ML; MG/100ML
INJECTION, SOLUTION INTRAVENOUS CONTINUOUS
Status: DISCONTINUED | OUTPATIENT
Start: 2024-04-04 | End: 2024-04-05

## 2024-04-04 RX ORDER — ACETAMINOPHEN 325 MG/1
650 TABLET ORAL EVERY 4 HOURS PRN
Status: DISCONTINUED | OUTPATIENT
Start: 2024-04-04 | End: 2024-04-06

## 2024-04-04 RX ADMIN — ACETAMINOPHEN 650 MG: 325 TABLET, FILM COATED ORAL at 23:42

## 2024-04-04 RX ADMIN — PIPERACILLIN AND TAZOBACTAM 2.25 G: 2; .25 INJECTION, POWDER, FOR SOLUTION INTRAVENOUS at 23:45

## 2024-04-04 RX ADMIN — PIPERACILLIN AND TAZOBACTAM 3.38 G: 3; .375 INJECTION, POWDER, FOR SOLUTION INTRAVENOUS at 03:28

## 2024-04-04 RX ADMIN — BUTALBITAL, ASPIRIN, AND CAFFEINE 1 CAPSULE: 50; 325; 40 CAPSULE ORAL at 20:29

## 2024-04-04 RX ADMIN — PIPERACILLIN AND TAZOBACTAM 2.25 G: 2; .25 INJECTION, POWDER, FOR SOLUTION INTRAVENOUS at 16:39

## 2024-04-04 RX ADMIN — MICONAZOLE NITRATE: 20 POWDER TOPICAL at 10:37

## 2024-04-04 RX ADMIN — PIPERACILLIN AND TAZOBACTAM 2.25 G: 2; .25 INJECTION, POWDER, FOR SOLUTION INTRAVENOUS at 09:36

## 2024-04-04 RX ADMIN — ESCITALOPRAM OXALATE 5 MG: 5 TABLET, FILM COATED ORAL at 23:43

## 2024-04-04 RX ADMIN — FAMOTIDINE 20 MG: 20 TABLET ORAL at 23:43

## 2024-04-04 RX ADMIN — SODIUM CHLORIDE, POTASSIUM CHLORIDE, SODIUM LACTATE AND CALCIUM CHLORIDE: 600; 310; 30; 20 INJECTION, SOLUTION INTRAVENOUS at 23:57

## 2024-04-04 RX ADMIN — BUTALBITAL, ASPIRIN, AND CAFFEINE 1 CAPSULE: 50; 325; 40 CAPSULE ORAL at 14:02

## 2024-04-04 ASSESSMENT — ACTIVITIES OF DAILY LIVING (ADL)
ADLS_ACUITY_SCORE: 33
ADLS_ACUITY_SCORE: 38
ADLS_ACUITY_SCORE: 35
ADLS_ACUITY_SCORE: 33
ADLS_ACUITY_SCORE: 35
ADLS_ACUITY_SCORE: 35
ADLS_ACUITY_SCORE: 33
ADLS_ACUITY_SCORE: 35
ADLS_ACUITY_SCORE: 33
ADLS_ACUITY_SCORE: 33
ADLS_ACUITY_SCORE: 35
ADLS_ACUITY_SCORE: 35
ADLS_ACUITY_SCORE: 38
ADLS_ACUITY_SCORE: 33
ADLS_ACUITY_SCORE: 35
ADLS_ACUITY_SCORE: 33
ADLS_ACUITY_SCORE: 33

## 2024-04-04 NOTE — PROGRESS NOTES
[facility]    Medicine Progress Note - Hospitalist Service    Date of Admission:  4/1/2024    Assessment & Plan      Savanna Rehman is a 83 year old female with a history of chronic A-fib on Coumadin, hypertension, heart failure with preserved ejection fraction, type 2 diabetes mellitus, CRISTIANA with noncompliance, FERREIRA who presents to the ED today due to nausea, emesis and lower abdominal pain with 3 loose stools.      ADDENDUM:  4pm    Spoke with cardiology and I appreciate his prompt input and evaluation  Case discussed by cardiology with EP service at [facility]  Patient likely had a syncopal event earlier with her dizziness and still has ongoing cardiac pauses  Stable BP levels.  Recommendations for [facility] transfer for EP evaluation with possible pacer insertion.  Patient is here earlier for diverticulitis with localized abscess. She appears to be improving in this aspect and currently tolerating full liquids.  No fever spikes, no significant leucocytosis and no recurrent abdominal symptoms.  Will contact patient care placement    Spoke with patient and discussed above plan and agreeable for planned transfer  HOLD warfarin dosing      ADDENDUM: 1pm    Notified about dizziness/lightheadedness  -no focal weakness  Not hypoxic, not orthostatic earlier.  -stable BP levels.  -NOT receiving any BB, CCB, earlier was on oral lasix  Hx of Afib, on warfarin  - EKG showed Afib with SVR  -still normotensive  -tele-monitor  -check repeat lytes  -check TSH  -echo  -formal cardio evaluation requested given symptomatic bradycardia. Not on negative inotropic agents         Acute Sigmoid Diverticulitis with possible adjacent 2.5 x 1.3 x 2.9 cm abscess  Pt presents with nausea, emesis, lower abdominal pain and watery stools.  Hx of chronic diarrhea on Imodium at baseline and was recently on Keflex for a UTI.  Pt is afebrile with normal wbc.  CT abd/pelvis reveals changes worrisome for sigmoid diverticulitis with  possible adjacent 2.5 x 1.3 x 2.9 cm abscess  -  - continue IV Zosyn  - Colorectal Surgery consult    -Not a candidate for percutaneous drainage  -Abdominal symptoms improved.  Continues to show improvement with no recurrent abdominal symptomatology.  Diet being advanced to full liquids  -Appreciate continuous input from colorectal service  -Patient is hopeful to continue to improve and will try to avoid surgical approach.  She reiterated again that as of this time she is not inclined in pursuing any surgical intervention.  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  She also mention she is not keen on pursuing surgical approach.  - on warfarin pharmacy protocol with most recent INR 2.5    HTN  Chronic Diastolic CHF  Has peripheral edema on exam, no shortness of breath.    -I resume her home regimen of diuretics earlier with significant improvement with her lower extremity edema.  IV fluids has been discontinued.  - monitor daily weights, I/O     CKD between stage II-III    -Had a slight bump in creatinine.  Likely from underlying illness  -Hold her creatinine today  -Patient is at risk for hypervolemia given her CKD and cirrhosis.  -Currently now on clear liquids.  Will resume her oral diuretics     DM type 2  Hgb A1C 6.1 (2/2024)  - hold Glipizide  - start ISS protocol  -Glucose levels within acceptable ranges     Hx FERREIRA  Chronic Thrombocytopenia  LFTs wnl.  Plt 139.  CT reveals Cirrhotic changes of the liver with associated secondary findings of portal venous hypertension which would include varicosities and splenomegaly. The portal veins are patent.   - follow up with GI     Depression  - continue Lexapro     CRISTIANA, noncompliant with cpap     Right Buttocks Fluid Collection  Incidentally noted on CT imaging is a nonspecific  "fluid collection in the deep subcutaneous fat over the right buttocks region measuring 7.8 x 3.0 x 5.9 cm.  Likely due to hematoma as pt reports this has been present for months following a prior fall.  - monitor             Diet: Full Liquid Diet    DVT Prophylaxis: Pneumatic Compression Devices,  Warfarin  Aguilar Catheter: Not present  Lines: None     Cardiac Monitoring: None  Code Status: Full Code      Clinically Significant Risk Factors              # Hypoalbuminemia: Lowest albumin = 3.4 g/dL at 4/2/2024  6:32 AM, will monitor as appropriate     # Thrombocytopenia: Lowest platelets = 95 in last 2 days, will monitor for bleeding  # Acute Kidney Injury, unspecified: based on a >150% or 0.3 mg/dL increase in last creatinine compared to past 90 day average, will monitor renal function   # Chronic heart failure with preserved ejection fraction: heart failure noted on problem list and last echo with EF >50%       # Obesity: Estimated body mass index is 37.41 kg/m  as calculated from the following:    Height as of this encounter: 1.727 m (5' 8\").    Weight as of this encounter: 111.6 kg (246 lb 0.5 oz)., PRESENT ON ADMISSION            Disposition Plan      Expected Discharge Date: Anticipating 1 to 2 days pending improvement with this diverticulitis and abscess      Destination: home with help/services              Jase Owen MD, MD  Hospitalist Service  [facility] [place]  Securely message with [employer] (more info)  Text page via [facility] Paging/Directory   ______________________________________________________________________    Interval History   Continuing medicine service care today.  Seen and examined.  Chart reviewed.    As always Ms. Corrales remained very pleasant and conversational interactive.  She appears to be in good spirits.    She mentioned no worsening abdominal discomfort.  Able to tolerate oral diet.  Demonstrated satisfying bowel movement yesterday.  Voiding freely.  Afebrile.  " Not requiring oxygen support.  No episodes of any mental status changes reported.          Physical Exam   Vital Signs: Temp: 97.4  F (36.3  C) Temp src: Oral BP: 108/61 Pulse: 63   Resp: 18 SpO2: 93 % O2 Device: None (Room air)    Weight: 246 lbs .53 oz    HEENT; Atraumatic, normocephalic, pinkish conjuctiva, pupils bilateral reactive   Skin: warm and moist, no rashes  Lymphatics: no cervical or axillary lymphandenopathy  Lungs: equal chest expansion, clear to auscultation, no wheezes, no stridor, no crackles,   Heart: normal rate, normal rhythm, no rubs or gallops.   Abdomen: normal bowel sounds, no tenderness, no peritoneal signs, no guarding  Extremities: no deformities, much improved lower extremity edema  Neuro; follow commands, alert and oriented x3, spontaneous speech, coherent, moves all extremities spontaneously  Psych; no hallucination, euthymic mood, not agitated      Medical Decision Making       45 MINUTES SPENT BY ME on the date of service doing chart review, history, exam, documentation & further activities per the note.  MANAGEMENT DISCUSSED with the following over the past 24 hours: Yes   NOTE(S)/MEDICAL RECORDS REVIEWED over the past 24 hours: Yes       Data     I have personally reviewed the following data over the past 24 hrs:    3.6 (L)  \   12.1   / 95 (L)     138 100 18.4 /  92   4.1 26 1.65 (H) \     INR:  2.55 (H) PTT:  N/A   D-dimer:  N/A Fibrinogen:  N/A       Imaging results reviewed over the past 24 hrs:   No results found for this or any previous visit (from the past 24 hour(s)).

## 2024-04-04 NOTE — PROGRESS NOTES
Colon and Rectal Surgery  Daily Progress Note    04/04/24     Subjective  Feels improved. Would like more to eat. Limited pain. Passing flatus and BM. Does report dizziness.    Objective  Intake/Output last 24 hrs:    Intake/Output Summary (Last 24 hours) at 4/4/2024 0741  Last data filed at 4/4/2024 0513  Gross per 24 hour   Intake 1620 ml   Output 2050 ml   Net -430 ml     Temp:  [97.3  F (36.3  C)-98.4  F (36.9  C)] 97.6  F (36.4  C)  Pulse:  [50-82] 54  Resp:  [16-18] 18  BP: (113-166)/(30-75) 113/45  SpO2:  [92 %-97 %] 96 %    Physical Exam:  General: awake, alert,  in no acute distress  Respiratory: non-labored breathing  Abdomen: soft, limited tenderness to deep palpation in LLQ  Musculoskeletal: AGUILAR  Psychological: alert and oriented, answers questions appropriately    Pertinent Labs  Lab Results: personally reviewed.  Lab Results   Component Value Date     04/04/2024     04/03/2024     04/02/2024     02/22/2021     12/04/2020     09/14/2020    CO2 26 04/04/2024    CO2 25 04/03/2024    CO2 29 04/02/2024    CO2 29 11/07/2022    CO2 31 06/02/2022    CO2 31 03/13/2022    CO2 29 02/22/2021    CO2 31 12/04/2020    CO2 24 09/14/2020    BUN 18.4 04/04/2024    BUN 16.4 04/03/2024    BUN 18.9 04/02/2024    BUN 17 11/07/2022    BUN 16 06/02/2022    BUN 14 03/13/2022    BUN 14 02/22/2021    BUN 15 12/04/2020    BUN 16 09/14/2020     Lab Results   Component Value Date    WBC 3.6 04/04/2024    WBC 3.3 04/03/2024    WBC 4.2 04/02/2024    WBC 6.1 02/22/2021    WBC 9.4 12/04/2020    WBC 7.3 09/14/2020    HGB 12.1 04/04/2024    HGB 11.6 04/03/2024    HGB 11.7 04/02/2024    HGB 14.7 02/22/2021    HGB 15.2 12/04/2020    HGB 15.1 09/14/2020    HCT 35.9 04/04/2024    HCT 35.3 04/03/2024    HCT 35.7 04/02/2024    HCT 44.5 02/22/2021    HCT 46.3 12/04/2020    HCT 44.2 09/14/2020    MCV 91 04/04/2024    MCV 93 04/03/2024    MCV 92 04/02/2024    MCV 91 02/22/2021    MCV 93 12/04/2020    MCV 91  09/14/2020    PLT 95 04/04/2024     04/03/2024     04/02/2024     02/22/2021     12/04/2020     09/14/2020       Assessment/Plan: This is a 83 year old woman with FERREIRA cirrhosis c/b portal hypertension, atrial fibrillation (on Coumadin), HTN, CHF, diabetes, and CRISTIANA who was admitted on 4/1/24 for diverticulitis with abscess.     Continue abx  Ok for FLD  Continue to monitor WBC - 3.6 today. Will repeat tomorrow.  Noted increase in Cr in setting of changes in fluids/diuretics. Good UOP. Monitor.    For questions/paging, please contact the CRS office at [phone].    Mili Holt MD  Colon and Rectal Surgery    [facility]  [address] [address]  T: [phone]  F: [phone]

## 2024-04-04 NOTE — Clinical Note
Hemodynamic equipment used: 5 lead ECG, OppexK With 3 Leads, Machine BP Cuff and pulse oximeter probe.

## 2024-04-04 NOTE — PROVIDER NOTIFICATION
Dr. Tamayo notified via [facility]  [address] FYI: patient having frequent 2-2.7 pauses longest pause 3 seconds. echo in process.HR 35-45. Dr. Tamayo came to see patient

## 2024-04-04 NOTE — DISCHARGE SUMMARY
"[facility]  Hospitalist Discharge Summary      Date of Admission:  4/1/2024  Date of Discharge:  4/4/2024  Discharging Provider: Jase Owen MD, MD  Discharge Service: Hospitalist Service    Discharge Diagnoses   Symptomatic bradycardia  Chronic Afib with SVR on chronic anticoagulation  Acute diverticulitis with localized abscess    Clinically Significant Risk Factors     # Obesity: Estimated body mass index is 37.41 kg/m  as calculated from the following:    Height as of this encounter: 1.727 m (5' 8\").    Weight as of this encounter: 111.6 kg (246 lb 0.5 oz).       Follow-ups Needed After Discharge   transfer to [facility]    Unresulted Labs Ordered in the Past 30 Days of this Admission       No orders found from 3/2/2024 to 4/2/2024.            Discharge Disposition   Discharged to home  Condition at discharge: Stable    Hospital Course   Savanna Rehman is a 83 year old female with a history of chronic A-fib on Coumadin, hypertension, heart failure with preserved ejection fraction, type 2 diabetes mellitus, CRISTIANA with noncompliance, FERREIRA who presents to the ED today due to nausea, emesis and lower abdominal pain with 3 loose stools.      ADDENDUM:  4pm    Spoke with cardiology and I appreciate his prompt input and evaluation  Case discussed by cardiology with EP service at [facility]  Patient likely had a syncopal event earlier with her dizziness and still has ongoing cardiac pauses  Stable BP levels.  Recommendations for [facility] transfer for EP evaluation with possible pacer insertion.  Patient is here earlier for diverticulitis with localized abscess. She appears to be improving in this aspect and currently tolerating full liquids.  No fever spikes, no significant leucocytosis and no recurrent abdominal symptoms.  Will contact patient care placement    Spoke with patient and discussed above plan and agreeable for planned transfer  HOLD warfarin dosing      ADDENDUM: 1pm    Notified about " dizziness/lightheadedness  -no focal weakness  Not hypoxic, not orthostatic earlier.  -stable BP levels.  -NOT receiving any BB, CCB, earlier was on oral lasix  Hx of Afib, on warfarin  - EKG showed Afib with SVR  -still normotensive  -tele-monitor  -check repeat lytes  -check TSH  -echo  -formal cardio evaluation requested given symptomatic bradycardia. Not on negative inotropic agents         Acute Sigmoid Diverticulitis with possible adjacent 2.5 x 1.3 x 2.9 cm abscess  Pt presents with nausea, emesis, lower abdominal pain and watery stools.  Hx of chronic diarrhea on Imodium at baseline and was recently on Keflex for a UTI.  Pt is afebrile with normal wbc.  CT abd/pelvis reveals changes worrisome for sigmoid diverticulitis with possible adjacent 2.5 x 1.3 x 2.9 cm abscess  -  - continue IV Zosyn  - Colorectal Surgery consult    -Not a candidate for percutaneous drainage  -Abdominal symptoms improved.  Continues to show improvement with no recurrent abdominal symptomatology.  Diet being advanced to full liquids  -Appreciate continuous input from colorectal service  -Patient is hopeful to continue to improve and will try to avoid surgical approach.  She reiterated again that as of this time she is not inclined in pursuing any surgical intervention.  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  She also mention she is not keen on pursuing surgical approach.  - on warfarin pharmacy protocol with most recent INR 2.5    HTN  Chronic Diastolic CHF  Has peripheral edema on exam, no shortness of breath.    -I resume her home regimen of diuretics earlier with significant improvement with her lower extremity edema.  IV fluids has been discontinued.  - monitor daily weights, I/O     CKD between stage II-III    -Had a slight bump in  creatinine.  Likely from underlying illness  -Hold her creatinine today  -Patient is at risk for hypervolemia given her CKD and cirrhosis.  -Currently now on clear liquids.  Will resume her oral diuretics     DM type 2  Hgb A1C 6.1 (2/2024)  - hold Glipizide  - start ISS protocol  -Glucose levels within acceptable ranges     Hx FERREIRA  Chronic Thrombocytopenia  LFTs wnl.  Plt 139.  CT reveals Cirrhotic changes of the liver with associated secondary findings of portal venous hypertension which would include varicosities and splenomegaly. The portal veins are patent.   - follow up with GI     Depression  - continue Lexapro     CRISTIANA, noncompliant with cpap     Right Buttocks Fluid Collection  Incidentally noted on CT imaging is a nonspecific fluid collection in the deep subcutaneous fat over the right buttocks region measuring 7.8 x 3.0 x 5.9 cm.  Likely due to hematoma as pt reports this has been present for months following a prior fall.  - monitor       Consultations This Hospital Stay   COLORECTAL SURGERY IP CONSULT  CARE MANAGEMENT / SOCIAL WORK IP CONSULT  PHARMACY TO DOSE WARFARIN  CARDIOLOGY IP CONSULT    Code Status   Full Code    Time Spent on this Encounter   I, Jase Owen MD, MD, personally saw the patient today and spent greater than 30 minutes discharging this patient.       Jase Owen MD, MD  [place] [address] [facility]  [address] [place] [address]  Phone: [phone]  Fax: [phone]  ______________________________________________________________________    Physical Exam   Vital Signs: Temp: 97.6  F (36.4  C) Temp src: Oral BP: 120/56 Pulse: (!) 43   Resp: 15 SpO2: 98 % O2 Device: None (Room air)    Weight: 246 lbs .53 oz  See same day exam on same day progress note       Primary Care Physician   Taylor Payan    Discharge Orders   No discharge procedures on file.    Significant Results and Procedures   Most Recent 3 CBC's:  Recent Labs   Lab Test  04/04/24  0659 04/03/24  0721 04/02/24  0632   WBC 3.6* 3.3* 4.2   HGB 12.1 11.6* 11.7   MCV 91 93 92   PLT 95* 105* 106*     Most Recent 3 BMP's:  Recent Labs   Lab Test 04/04/24  1459 04/04/24  1325 04/04/24  1324 04/04/24  0908 04/04/24  0814 04/04/24  0659 04/03/24  0915 04/03/24  0721 04/02/24  0849 04/02/24  0632   NA  --   --   --   --   --  138  --  139  --  138   POTASSIUM  --   --  4.0  --   --  4.1  --  4.0  --  4.0   CHLORIDE  --   --   --   --   --  100  --  101  --  98   CO2  --   --   --   --   --  26  --  25  --  29   BUN  --   --   --   --   --  18.4  --  16.4  --  18.9   CR  --   --   --   --   --  1.65*  --  1.16*  --  1.25*   ANIONGAP  --   --   --   --   --  12  --  13  --  11   SAUL  --   --   --   --   --  8.5*  --  8.6*  --  8.6*   * 139*  --  101*   < > 102*   < > 84   < > 101*    < > = values in this interval not displayed.     Most Recent 2 LFT's:  Recent Labs   Lab Test 04/02/24  0632 04/01/24  1625   AST 41 44   ALT 20 24   ALKPHOS 99 118   BILITOTAL 1.1 1.2     Most Recent D-dimer:  Recent Labs   Lab Test 06/13/23  1441   DD 1.29*     7-Day Micro Results       No results found for the last 168 hours.        ,   Results for orders placed or performed during the hospital encounter of 04/01/24   CT Abdomen Pelvis w Contrast    Narrative    EXAM: CT ABDOMEN PELVIS W CONTRAST  LOCATION: [facility]  DATE: 4/1/2024    INDICATION: Abdominal pain, vomiting, diarrhea.  COMPARISON: CT 03/13/2022  TECHNIQUE: CT scan of the abdomen and pelvis was performed following injection of IV contrast. Multiplanar reformats were obtained. Dose reduction techniques were used.  CONTRAST: 100mL Isovue 370    FINDINGS:   LOWER CHEST: Mild to moderate coronary artery calcification.    HEPATOBILIARY: The gallbladder is surgically absent. Mild cirrhotic configuration of the liver. The portal veins are patent and the bile ducts are normal in caliber. There are secondary findings of portal  venous hypertension which would include scattered   varicosities and splenomegaly.    PANCREAS: Significant atrophy.    SPLEEN: Splenomegaly with the spleen measuring approximately 15 cm in greatest longitudinal dimension.    ADRENAL GLANDS: Normal.    KIDNEYS/BLADDER: Normal.    BOWEL: There is thickening seen involving the proximal and mid portions of the sigmoid colon with scattered diverticuli. The above findings would be worrisome for diverticulitis. Just lateral to the mid sigmoid colon, there is an abnormal fluid   collection measuring approximately 2.5 x 1.3 x 2.9 cm and this is nonspecific, but could represent an abscess without associated gas. On CT 03/13/2022, there was a tiny abnormality at this same location; however, this only measured 1.2 x 0.6 x 1.2 cm.   The appendix is not seen and is likely surgically absent or atrophic. Prior postoperative changes near the EG junction.    LYMPH NODES: Mildly prominent right groin lymph node measuring 1.6 x 1.1 cm and differential would include an abscess without associated gas or a seroma/hematoma. This is new since 03/13/2022.    VASCULATURE: Moderate to marked calcification with no aneurysmal dilatation.    PELVIC ORGANS: Surgically absent.    MUSCULOSKELETAL: There is an abnormal well-circumscribed fluid collection with no associated gas seen involving the subcutaneous fat of the mid right buttocks region and this measures approximately 7.8 x 3.0 x 5.9 cm       Impression    IMPRESSION:   1.  Changes worrisome for sigmoid diverticulitis with possible adjacent 2.5 x 1.3 x 2.9 cm abscess.    2.  Nonspecific fluid collection in the deep subcutaneous fat over the right buttocks region measuring 7.8 x 3.0 x 5.9 cm and differential would include an abscess without associated gas or seroma/hematoma.    3.  Cirrhotic changes of the liver with associated secondary findings of portal venous hypertension which would include varicosities and splenomegaly. The portal veins  are patent.    4.  Mildly prominent right groin lymph node, this may be reactive/inflammatory in nature.   Echocardiogram Complete     Value    LVEF  55-60%    [facility]    [id number]  [id number]  [id number]  [id number]^ANDERS^AL^LESLIE     [facility]  Echocardiography Laboratory  [address] [place] [address]     Name: KRIS SANDERSON  MRN: [id number]  : 1940  Study Date: 2024 02:06 PM  Age: 83 yrs  Gender: Female  Patient Location: [facility]  Reason For Study: Atrial Fibrillation  Ordering Physician: NATHAN MCNAMARA  Performed By: Dale Liu RDCS     BSA: 2.2 m2  Height: 68 in  Weight: 246 lb  BP: 120/56 mmHg  ______________________________________________________________________________  Procedure  Complete Portable Echo Adult.  ______________________________________________________________________________  Interpretation Summary     Left ventricular systolic function is normal.The visual ejection fraction is  55-60%.  The right ventricular systolic function is normal.  The left atrium is moderately dilated.  There is mild (1+) mitral regurgitation.There is mild to moderate (1-2+)  tricuspid regurgitation.There is mild (1+) aortic regurgitation.  Dilation of the inferior vena cava is present with abnormal respiratory  variation in diameter.     Compared to prior study dated 2024 no significant changes  ______________________________________________________________________________  Left Ventricle  The left ventricle is normal in size. There is normal left ventricular wall  thickness. Left ventricular systolic function is normal. The visual ejection  fraction is 55-60%. Diastolic Doppler findings (E/E' ratio and/or other  parameters) suggest left ventricular filling pressures are indeterminate. No  regional wall motion abnormalities noted.     Right Ventricle  The right ventricle is normal size. The right ventricular systolic function is  normal.     Atria  The left  atrium is moderately dilated. The right atrium is moderately dilated.  There is no color Doppler evidence of an atrial shunt.     Mitral Valve  There is mild (1+) mitral regurgitation.     Tricuspid Valve  There is mild to moderate (1-2+) tricuspid regurgitation. The right  ventricular systolic pressure is approximated at 24mmHg plus the right atrial  pressure.     Aortic Valve  There is mild trileaflet aortic sclerosis. There is mild (1+) aortic  regurgitation. No aortic stenosis is present.     Pulmonic Valve  There is mild (1+) pulmonic valvular regurgitation. There is no pulmonic  valvular stenosis.     Vessels  The aortic root is normal size. Normal size ascending aorta. Dilation of the  inferior vena cava is present with abnormal respiratory variation in diameter.     Pericardium  There is no pericardial effusion.     Rhythm  The rhythm was atrial fibrillation.  ______________________________________________________________________________  MMode/2D Measurements & Calculations  IVSd: 0.81 cm  LVIDd: 4.8 cm  LVIDs: 3.3 cm  LVPWd: 0.87 cm  IVC diam: 3.4 cm  FS: 31.3 %  LV mass(C)d: 133.5 grams  LV mass(C)dI: 59.8 grams/m2  Ao root diam: 3.2 cm  asc Aorta Diam: 3.5 cm  LVOT diam: 1.7 cm  LVOT area: 2.3 cm2  Ao root diam index Ht(cm/m): 1.9  Ao root diam index BSA (cm/m2): 1.5  Asc Ao diam index BSA (cm/m2): 1.6  Asc Ao diam index Ht(cm/m): 2.0  EF Biplane: 60.2 %  LA Volume (BP): 71.2 ml     LA Volume Index (BP): 31.9 ml/m2  RWT: 0.36  TAPSE: 2.2 cm     Time Measurements  Aortic HR: 43.0 BPM     Doppler Measurements & Calculations  MV E max everett: 126.3 cm/sec  AI P1/2t: 691.8 msec  LV V1 max P.6 mmHg  LV V1 max: 107.0 cm/sec  LV V1 VTI: 27.5 cm  CO(LVOT): 2.7 l/min  CI(LVOT): 1.2 l/min/m2  SV(LVOT): 62.8 ml  SI(LVOT): 28.2 ml/m2     PA acc time: 0.11 sec  TR max everett: 235.5 cm/sec  TR max P.3 mmHg  E/E' av.0  Lateral E/e': 8.5  Medial E/e': 9.4  RV S Everett: 10.6 cm/sec      ______________________________________________________________________________  Report approved by: June Blas 04/04/2024 02:48 PM           *Note: Due to a large number of results and/or encounters for the requested time period, some results have not been displayed. A complete set of results can be found in Results Review.       Discharge Medications   Current Discharge Medication List        CONTINUE these medications which have NOT CHANGED    Details   acetaminophen (TYLENOL) 500 MG tablet Take 1,000 mg by mouth every 6 hours as needed for mild pain      Cholecalciferol (VITAMIN D-3) 125 MCG (5000 UT) TABS Take 5,000 Units by mouth daily    Associated Diagnoses: Vitamin D deficiency      escitalopram (LEXAPRO) 5 MG tablet Take 1 tablet by mouth once daily  Qty: 30 tablet, Refills: 0    Associated Diagnoses: Moderate episode of recurrent major depressive disorder (H)      famotidine (PEPCID) 20 MG tablet Take 20 mg by mouth 2 times daily      furosemide (LASIX) 20 MG tablet Take 1 tablet (20 mg) by mouth daily  Qty: 30 tablet, Refills: 1    Associated Diagnoses: Congestive heart failure, unspecified HF chronicity, unspecified heart failure type (H)      glipiZIDE (GLUCOTROL XL) 2.5 MG 24 hr tablet Take 1 tablet by mouth twice daily  Qty: 180 tablet, Refills: 0    Associated Diagnoses: Diabetic polyneuropathy associated with type 2 diabetes mellitus (H)      indapamide (LOZOL) 2.5 MG tablet Take 1 tablet (2.5 mg) by mouth every morning  Qty: 90 tablet, Refills: 1    Associated Diagnoses: Swelling of lower leg      loperamide (IMODIUM) 2 MG capsule TAKE 1 CAPSULE BY MOUTH 4 TIMES DAILY AS NEEDED FOR DIARRHEA  Qty: 60 capsule, Refills: 0    Associated Diagnoses: Diarrhea, unspecified type      !! warfarin ANTICOAGULANT (COUMADIN) 2.5 MG tablet Take 3.75 mg by mouth six times a week Daily except on Mondays      !! warfarin ANTICOAGULANT (COUMADIN) 2.5 MG tablet Take 2.5 mg by mouth once a week On Monday        !! - Potential duplicate medications found. Please discuss with provider.        Allergies   Allergies   Allergen Reactions    Augmented Betamethasone Diprop [Betamethasone] Other (See Comments)     Severe yeast infection    Petroleum Jelly [Petrolatum] Anaphylaxis     Rash and swelling    Shellfish-Derived Products Anaphylaxis     Tongue swelling    Aspirin Swelling     tiongue swelling    Bacitracin      Rash swelling    Bactrim [Sulfamethoxazole-Trimethoprim] Dizziness    Coumadin [Warfarin] Swelling     Leg swelling    Darvon [Propoxyphene] Swelling     Throat closes    Dilaudid [Hydromorphone]      No side effects from fentanyl June 2023    Levaquin [Levofloxacin] Swelling     Tongue swelling    Lidocaine     Morphine Unknown     [facility]    Neomycin Swelling     rash    Neosporin [Neomycin-Polymyxin-Gramicidin] Swelling     rash    Nitrofurantoin      SOB, GI upset,    Oxycodone      Severe itching    Percocet [Oxycodone-Acetaminophen] Unknown    Percodan [Oxycodone-Aspirin]      Severe itching    Polymyxin B     Pramoxine     Tramadol     Vicodin [Hydrocodone-Acetaminophen]      Severe itching      Xarelto [Rivaroxaban]     Adhesive Tape Rash     Band aids     Codeine Rash    Hydrocortisone Rash and Swelling    Other Environmental Allergy Rash     Adhesive tape   Band aids

## 2024-04-04 NOTE — PLAN OF CARE
"HR 32-45 occasional 50's. C/o dizziness that she thinks started yesterday. Pt states to this writer that she actually thinks she \"passed out\" yesterday in her bed around 1500. MD aware. Tele placed. EKG Afib SVR. Dr. Owen and DR Tamayo notified. Echo ordered. Bedrest with br privileges ordered. Tele Afib SVR 30's-40's with frequent 2-2.4 sec pauses. Longest pause 3 sec. Diet advanced to full liquid . Pt denies n/v. Did have 2 lose watery stools after eating. She states this is baseline for her. Denies abd cramping. Afebrile. BG stable. Alarms on bed. Calls aprop. Fiornal for headache. Denies cp. Pt did notify her daughter cedric and her friend. Pt was tearful and worried about Heart. She has expressed to this writer multiple times that if its her time its her time. Pt is a full code. Dr. Owen notified and did talk with pt. Cards consulted  Problem: Dysrhythmia  Goal: Normalized Cardiac Rhythm  Outcome: Not Progressing   Goal Outcome Evaluation:                        "

## 2024-04-04 NOTE — PROVIDER NOTIFICATION
Hr per pulse oximeter 43. Brachial HR also 43 b/p 119/43. Pt continues to c/o dizziness. States she has had to take meclizine in the past for dizziness. Denies cp. Denies sob. She states she has a hx of afib. Dr. Owen messaged via [employer]. EKG and telemetry ordered.    1251:  Dr. Owen notified EKG done HR 32-38 on EKG .  B/p 120/58. MD ordered cards and echo. Bedrest orders with commode ordered

## 2024-04-04 NOTE — PROVIDER NOTIFICATION
messaged via [employer]: pt continues to c/o severe dizziness. she states that she thinks she passed out from it yesterday in bed a few times breifly. I did recheck orthostatics. her b/p was lower lying down. she had concerns that zosyn could be causing the dizziness as she has so many allergies. but then also stated she has taken meclizine in the past. do you want to try meclizine? Meclizine was ordered

## 2024-04-04 NOTE — PROVIDER NOTIFICATION
Dr. Owen notified via [employer] message: FYI. pt has had frequent pauses. 2-2.4 sec and one 3 sec pause.  hr still 35-40's. Echo in process. DR. Owen requested Cardiology be notified. Cards was notified.

## 2024-04-04 NOTE — CONSULTS
[facility]    Cardiology Consultation     Date of Admission:  4/1/2024    Assessment & Plan   Savanna Rehman is a 83 year old female who was admitted on 4/1/2024.    Impression:  1.  Chronic atrial fibrillation with significant bradycardia and pauses up to 3.2 seconds while awake.  1 episode did sound syncopal.  The dizziness sounds vertiginous.  Chronically anticoagulated with Coumadin.  On no medical therapy that would cause of bradycardia or pauses.  2.  Diverticulitis on antibiotics and significantly improved.  3.  Severe obesity.    Plan:  1.  I will discuss the case with the electrophysiologist.  She does not meet full criteria for pacemaker placement in patients with atrial fibrillation were 3.5 seconds is the cutoff.  However she does appear to be symptomatic and possibly had 1 near syncopal episode and so she is in a gray zone.  2.  Awaiting the results of the echocardiogram.          Benson Houser MD, FACC, FRCP I    Primary Care Physician   Taylor Payan    Reason for Consult   Reason for consult: I was asked by hospitalist service to evaluate this patient for pauses and dizziness..    History of Present Illness   Savanna Rehman is a 83 year old female who presents with diverticulitis of the sigmoid colon with abdominal discomfort and diarrhea.  Patient has been doing better.  Had an episode yesterday when she was not on telemetry where she was on her left side looking at her phone and then she has no recollection of her falling back onto the pillow.  She woke up on the flat of her back on the bed and did not know how she got there.  This patient has chronic atrial fibrillation and anticoagulated with warfarin.  She has been having increasingly more pauses today.  Heart rates have been in the 30s on occasion and she did have a 3.2-second pause today while she was fully awake.  She was not symptomatic with that.  She does complain of persistent dizziness.  However the  dizziness does sound vertiginous and that she feels that her surroundings are spinning.  She is on no medications that would cause pausing.  She is not complaining of any chest pains or chest pressure or unusual shortness of breath she is not in a significant amount of pain now to cause vagal episodes.  History of obstructive sleep apnea.    Past Medical History   Past Medical History:   Diagnosis Date    Acute encephalopathy 09/21/2023    Anemia     Iron Deficiency anemia    Atrial fibrillation (H)     CAD (coronary artery disease)     non-obstructive    Chronic pain     neck, low back, legs    Congestive heart failure (H)     Degenerative disk disease     Diabetes (H)     Fibromyalgia     Gastro-oesophageal reflux disease     Gout     Hiatal hernia     Mumps     Neuropathy     CRISTIANA (obstructive sleep apnea) - CPAP     Palpitations     Pernicious anemia     Sleep apnea     uses CPAP.    Urinary incontinence     Vitamin D deficiency          Past Surgical History   Past Surgical History:   Procedure Laterality Date    APPENDECTOMY      CHOLECYSTECTOMY      COLONOSCOPY  3/15/2011    COLONOSCOPY N/A 3/15/2023    Procedure: COLONOSCOPY, WITH POLYPECTOMY AND BIOPSY;  Surgeon: Maci Coronel MD;  Location:  GI    COLONOSCOPY N/A 3/15/2023    Procedure: COLONOSCOPY, FLEXIBLE, WITH LESION REMOVAL USING SNARE;  Surgeon: Maci Coronel MD;  Location:  GI    CORONARY ANGIOGRAPHY ADULT ORDER      CYSTOSCOPY, BIOPSY BLADDER, COMBINED N/A 7/13/2020    Procedure: CYSTOSCOPY, WITH BLADDER BIOPSY;  Surgeon: Deisy Wilson MD;  Location:  OR    HEART CATH LEFT HEART CATH  12/30/16    medication management    HYSTERECTOMY TOTAL ABDOMINAL      Knee replacement NOS Left     LAPAROSCOPIC NISSEN FUNDOPLICATION N/A 2/4/2015    Procedure: LAPAROSCOPIC NISSEN FUNDOPLICATION;  Surgeon: Armando Ansari MD;  Location:  OR    TONSILLECTOMY      TRANSPOSITION ULNAR NERVE (ELBOW)           Prior to Admission  Medications   Prior to Admission Medications   Prescriptions Last Dose Informant Patient Reported? Taking?   Cholecalciferol (VITAMIN D-3) 125 MCG (5000 UT) TABS 4/1/2024 at am  No Yes   Sig: Take 5,000 Units by mouth daily   acetaminophen (TYLENOL) 500 MG tablet Past Week  Yes Yes   Sig: Take 1,000 mg by mouth every 6 hours as needed for mild pain   escitalopram (LEXAPRO) 5 MG tablet 3/31/2024 at pm HS  No Yes   Sig: Take 1 tablet by mouth once daily   famotidine (PEPCID) 20 MG tablet 4/1/2024 at am  Yes Yes   Sig: Take 20 mg by mouth 2 times daily   furosemide (LASIX) 20 MG tablet 4/1/2024 at am  No Yes   Sig: Take 1 tablet (20 mg) by mouth daily   glipiZIDE (GLUCOTROL XL) 2.5 MG 24 hr tablet 4/1/2024 at am  No Yes   Sig: Take 1 tablet by mouth twice daily   indapamide (LOZOL) 2.5 MG tablet 4/1/2024 at am  No Yes   Sig: Take 1 tablet (2.5 mg) by mouth every morning   loperamide (IMODIUM) 2 MG capsule 3/31/2024  No Yes   Sig: TAKE 1 CAPSULE BY MOUTH 4 TIMES DAILY AS NEEDED FOR DIARRHEA   warfarin ANTICOAGULANT (COUMADIN) 2.5 MG tablet 3/25/2024 at pm  Yes No   Sig: Take 2.5 mg by mouth once a week On Monday   warfarin ANTICOAGULANT (COUMADIN) 2.5 MG tablet 3/31/2024 at pm  Yes Yes   Sig: Take 3.75 mg by mouth six times a week Daily except on Mondays      Facility-Administered Medications: None     Current Facility-Administered Medications   Medication Dose Route Frequency Provider Last Rate Last Admin    acetaminophen (TYLENOL) tablet 650 mg  650 mg Oral Q4H PRN Silvia Redmond PA-C   650 mg at 04/02/24 1021    butalbital-aspirin-caffeine (FIORINAL) capsule 1 capsule  1 capsule Oral Q4H PRN Russ Colunga MD   1 capsule at 04/04/24 1402    glucose gel 15-30 g  15-30 g Oral Q15 Min PRN Jase Owen MD        Or    dextrose 50 % injection 25-50 mL  25-50 mL Intravenous Q15 Min PRN Jase Owen MD        Or    glucagon injection 1 mg  1 mg Subcutaneous Q15 Min PRN Jase Owen MD         escitalopram (LEXAPRO) tablet 5 mg  5 mg Oral QPM Russ Colunga MD   5 mg at 04/03/24 2115    [Held by provider] furosemide (LASIX) tablet 20 mg  20 mg Oral Daily Jase Owen MD   20 mg at 04/03/24 1047    insulin aspart (NovoLOG) injection (RAPID ACTING)  1-7 Units Subcutaneous TID AC Jase Owen MD        insulin aspart (NovoLOG) injection (RAPID ACTING)  1-5 Units Subcutaneous At Bedtime Jase Owen MD        meclizine (ANTIVERT) tablet 25 mg  25 mg Oral TID PRN Jase Owen MD        miconazole (MICATIN) 2 % powder   Topical BID Jase Owen MD   Given at 04/04/24 1037    ondansetron (ZOFRAN ODT) ODT tab 4 mg  4 mg Oral Q6H PRN Silvia Redmond PA-C        Or    ondansetron (ZOFRAN) injection 4 mg  4 mg Intravenous Q6H PRN Silvia Redmond PA-C        piperacillin-tazobactam (ZOSYN) 2.25 g vial to attach to  ml bag  2.25 g Intravenous Q6H Jase Owen MD   2.25 g at 04/04/24 0936    sodium chloride (PF) 0.9% PF flush 3 mL  3 mL Intracatheter Q8H Silvia Redmond PA-C   3 mL at 04/04/24 0906    sodium chloride (PF) 0.9% PF flush 3 mL  3 mL Intracatheter q1 min prn Silvia Redmond PA-C        warfarin ANTICOAGULANT (COUMADIN) tablet 3 mg  3 mg Oral ONCE at 18:00 Jase Owen MD        Warfarin Dose Required Daily - Pharmacist Managed  1 each Oral See Admin Instructions Jase Owen MD         Facility-Administered Medications Ordered in Other Encounters   Medication Dose Route Frequency Provider Last Rate Last Admin    nitroglycerin 100 MCG/ML injection              Current Facility-Administered Medications   Medication Dose Route Frequency Provider Last Rate Last Admin    acetaminophen (TYLENOL) tablet 650 mg  650 mg Oral Q4H PRN Silvia Redmond PA-C   650 mg at 04/02/24 1021    butalbital-aspirin-caffeine (FIORINAL) capsule 1 capsule  1 capsule Oral Q4H PRN Russ Colunga MD   1 capsule at 04/04/24 1402    glucose gel  15-30 g  15-30 g Oral Q15 Min PRN Jase Owen MD        Or    dextrose 50 % injection 25-50 mL  25-50 mL Intravenous Q15 Min PRN Jase Owen MD        Or    glucagon injection 1 mg  1 mg Subcutaneous Q15 Min PRN Jase Owen MD        escitalopram (LEXAPRO) tablet 5 mg  5 mg Oral QPM Russ Colunga MD   5 mg at 04/03/24 2115    [Held by provider] furosemide (LASIX) tablet 20 mg  20 mg Oral Daily Jase Owen MD   20 mg at 04/03/24 1047    insulin aspart (NovoLOG) injection (RAPID ACTING)  1-7 Units Subcutaneous TID AC Jase Owen MD        insulin aspart (NovoLOG) injection (RAPID ACTING)  1-5 Units Subcutaneous At Bedtime Jase Owen MD        meclizine (ANTIVERT) tablet 25 mg  25 mg Oral TID PRN Jase Owen MD        miconazole (MICATIN) 2 % powder   Topical BID Jase Owen MD   Given at 04/04/24 1037    ondansetron (ZOFRAN ODT) ODT tab 4 mg  4 mg Oral Q6H PRN Silvia Redmond PA-C        Or    ondansetron (ZOFRAN) injection 4 mg  4 mg Intravenous Q6H PRN Silvia Redmond PA-C        piperacillin-tazobactam (ZOSYN) 2.25 g vial to attach to  ml bag  2.25 g Intravenous Q6H Jase Owen MD   2.25 g at 04/04/24 0936    sodium chloride (PF) 0.9% PF flush 3 mL  3 mL Intracatheter Q8H Silvai Redmond PA-C   3 mL at 04/04/24 0906    sodium chloride (PF) 0.9% PF flush 3 mL  3 mL Intracatheter q1 min prn Silvia Redmond PA-C        warfarin ANTICOAGULANT (COUMADIN) tablet 3 mg  3 mg Oral ONCE at 18:00 Jase Owen MD        Warfarin Dose Required Daily - Pharmacist Managed  1 each Oral See Admin Instructions Jase Owen MD         Facility-Administered Medications Ordered in Other Encounters   Medication Dose Route Frequency Provider Last Rate Last Admin    nitroglycerin 100 MCG/ML injection              Allergies   Allergies   Allergen Reactions    Augmented Betamethasone Diprop [Betamethasone] Other  (See Comments)     Severe yeast infection    Petroleum Jelly [Petrolatum] Anaphylaxis     Rash and swelling    Shellfish-Derived Products Anaphylaxis     Tongue swelling    Aspirin Swelling     tiongue swelling    Bacitracin      Rash swelling    Bactrim [Sulfamethoxazole-Trimethoprim] Dizziness    Coumadin [Warfarin] Swelling     Leg swelling    Darvon [Propoxyphene] Swelling     Throat closes    Dilaudid [Hydromorphone]      No side effects from fentanyl June 2023    Levaquin [Levofloxacin] Swelling     Tongue swelling    Lidocaine     Morphine Unknown     [facility] 2/23[facility]24    Neomycin Swelling     rash    Neosporin [Neomycin-Polymyxin-Gramicidin] Swelling     rash    Nitrofurantoin      SOB, GI upset,    Oxycodone      Severe itching    Percocet [Oxycodone-Acetaminophen] Unknown    Percodan [Oxycodone-Aspirin]      Severe itching    Polymyxin B     Pramoxine     Tramadol     Vicodin [Hydrocodone-Acetaminophen]      Severe itching      Xarelto [Rivaroxaban]     Adhesive Tape Rash     Band aids     Codeine Rash    Hydrocortisone Rash and Swelling    Other Environmental Allergy Rash     Adhesive tape   Band aids        Social History    reports that she quit smoking about 9 years ago. Her smoking use included cigarettes. She has a 25 pack-year smoking history. She has been exposed to tobacco smoke. She has never used smokeless tobacco. She reports current alcohol use. She reports that she does not use drugs.      Family History   I have reviewed this patient's family history and updated it with pertinent information if needed.  Family History   Problem Relation Age of Onset    Alzheimer Disease Mother     Lung Cancer Father     No Known Problems Brother     No Known Problems Brother     No Known Problems Brother     Unknown/Adopted No family hx of           Review of Systems   A comprehensive review of system was performed and is negative other than that noted in the HPI or here.     Physical Exam  "  Vital Signs with Ranges  Temp:  [97.3  F (36.3  C)-98.4  F (36.9  C)] 97.6  F (36.4  C)  Pulse:  [43-63] 43  Resp:  [16-18] 18  BP: (108-145)/(36-63) 120/56  SpO2:  [92 %-98 %] 98 %  Wt Readings from Last 4 Encounters:   04/04/24 111.6 kg (246 lb 0.5 oz)   02/24/24 108 kg (238 lb)   02/08/24 107.5 kg (237 lb)   02/01/24 110.5 kg (243 lb 8 oz)     I/O last 3 completed shifts:  In: 1860 [P.O.:1860]  Out: 2050 [Urine:2050]      Vitals: /56 (BP Location: Left arm)   Pulse (!) 43   Temp 97.6  F (36.4  C) (Oral)   Resp 18   Ht 1.727 m (5' 8\")   Wt 111.6 kg (246 lb 0.5 oz)   LMP  (LMP Unknown)   SpO2 98%   BMI 37.41 kg/m      Physical Exam:   General - Alert and oriented to time place and person in no acute distress.  Obese  Eyes - No scleral icterus  HEENT - Neck supple, moist mucous membranes  Cardiovascular -heart sounds 1 variable heart sound two normal.  Bradycardic.  Jugular venous pulse is not raised.  Carotids are normal with no bruits.  Extremities - There is no peripheral edema  Respiratory -chest is clear to percussion auscultation  Skin - No pallor or cyanosis  Gastrointestinal - Non tender and non distended without rebound or guarding  Psych - Appropriate affect   Neurological - No gross motor neurological focal deficits    No lab results found in last 7 days.    Invalid input(s): \"TROPONINIES\"    Recent Labs   Lab 04/04/24  1459 04/04/24  1325 04/04/24  1324 04/04/24  0908 04/04/24  0814 04/04/24  0659 04/03/24  0915 04/03/24  0721 04/02/24  2038 04/02/24  1719 04/02/24  0849 04/02/24  0632 04/01/24  2139 04/01/24  1625   WBC  --   --   --   --   --  3.6*  --  3.3*  --   --   --  4.2  --  4.5   HGB  --   --   --   --   --  12.1  --  11.6*  --   --   --  11.7  --  12.9   MCV  --   --   --   --   --  91  --  93  --   --   --  92  --  92   PLT  --   --   --   --   --  95*  --  105*  --   --   --  106*  --  139*   INR  --   --   --   --   --  2.55*  --  1.99*  --  2.04*  --   --   --  2.12*   NA  " "--   --   --   --   --  138  --  139  --   --   --  138  --  138   POTASSIUM  --   --  4.0  --   --  4.1  --  4.0  --   --   --  4.0  --  3.8   CHLORIDE  --   --   --   --   --  100  --  101  --   --   --  98  --  97*   CO2  --   --   --   --   --  26  --  25  --   --   --  29  --  32*   BUN  --   --   --   --   --  18.4  --  16.4  --   --   --  18.9  --  20.6   CR  --   --   --   --   --  1.65*  --  1.16*  --   --   --  1.25*  --  1.18*   GFRESTIMATED  --   --   --   --   --  31*  --  47*  --   --   --  43*  --  46*   ANIONGAP  --   --   --   --   --  12  --  13  --   --   --  11  --  9   SAUL  --   --   --   --   --  8.5*  --  8.6*  --   --   --  8.6*  --  9.2   * 139*  --  101*   < > 102*   < > 84   < >  --    < > 101*   < > 99   ALBUMIN  --   --   --   --   --   --   --   --   --   --   --  3.4*  --  3.9   PROTTOTAL  --   --   --   --   --   --   --   --   --   --   --  6.8  --  7.9   BILITOTAL  --   --   --   --   --   --   --   --   --   --   --  1.1  --  1.2   ALKPHOS  --   --   --   --   --   --   --   --   --   --   --  99  --  118   ALT  --   --   --   --   --   --   --   --   --   --   --  20  --  24   AST  --   --   --   --   --   --   --   --   --   --   --  41  --  44    < > = values in this interval not displayed.     Recent Labs   Lab Test 10/19/23  1035 11/07/22  1218   CHOL 136 131   HDL 42* 33*   LDL 78 76   TRIG 79 109     Recent Labs   Lab 04/04/24  0659 04/03/24  0721 04/02/24  0632   WBC 3.6* 3.3* 4.2   HGB 12.1 11.6* 11.7   HCT 35.9 35.3 35.7   MCV 91 93 92   PLT 95* 105* 106*     No results for input(s): \"PH\", \"PHV\", \"PO2\", \"PO2V\", \"SAT\", \"PCO2\", \"PCO2V\", \"HCO3\", \"HCO3V\" in the last 168 hours.  No results for input(s): \"NTBNPI\", \"NTBNP\" in the last 168 hours.  No results for input(s): \"DD\" in the last 168 hours.  No results for input(s): \"SED\", \"CRP\" in the last 168 hours.  Recent Labs   Lab 04/04/24  0659 04/03/24  0721 04/02/24  0632   PLT 95* 105* 106*     Recent Labs   Lab " 24  1324   TSH 2.98     Recent Labs   Lab 24  2030   COLOR Light Yellow   APPEARANCE Clear   URINEGLC Negative   URINEBILI Negative   URINEKETONE Negative   SG 1.005   UBLD Negative   URINEPH 8.0*   PROTEIN Negative   NITRITE Negative   LEUKEST Negative   RBCU <1   WBCU <1       Imaging:  Recent Results (from the past 48 hour(s))   Echocardiogram Complete   Result Value    LVEF  55-60%    [facility]    [id number]  [id number]  [id number]  [id number]^ANDERS^AL^LESLIE     [facility]  Echocardiography Laboratory  [address] [place] [address]     Name: KRIS SANDERSON  MRN: [id number]  : 1940  Study Date: 2024 02:06 PM  Age: 83 yrs  Gender: Female  Patient Location: [facility]  Reason For Study: Atrial Fibrillation  Ordering Physician: NATHAN MCNAMARA  Performed By: Dale Liu RDCS     BSA: 2.2 m2  Height: 68 in  Weight: 246 lb  BP: 120/56 mmHg  ______________________________________________________________________________  Procedure  Complete Portable Echo Adult.  ______________________________________________________________________________  Interpretation Summary     Left ventricular systolic function is normal.The visual ejection fraction is  55-60%.  The right ventricular systolic function is normal.  The left atrium is moderately dilated.  There is mild (1+) mitral regurgitation.There is mild to moderate (1-2+)  tricuspid regurgitation.There is mild (1+) aortic regurgitation.  Dilation of the inferior vena cava is present with abnormal respiratory  variation in diameter.     Compared to prior study dated 2024 no significant changes  ______________________________________________________________________________  Left Ventricle  The left ventricle is normal in size. There is normal left ventricular wall  thickness. Left ventricular systolic function is normal. The visual ejection  fraction is 55-60%. Diastolic Doppler findings (E/E' ratio and/or  other  parameters) suggest left ventricular filling pressures are indeterminate. No  regional wall motion abnormalities noted.     Right Ventricle  The right ventricle is normal size. The right ventricular systolic function is  normal.     Atria  The left atrium is moderately dilated. The right atrium is moderately dilated.  There is no color Doppler evidence of an atrial shunt.     Mitral Valve  There is mild (1+) mitral regurgitation.     Tricuspid Valve  There is mild to moderate (1-2+) tricuspid regurgitation. The right  ventricular systolic pressure is approximated at 24mmHg plus the right atrial  pressure.     Aortic Valve  There is mild trileaflet aortic sclerosis. There is mild (1+) aortic  regurgitation. No aortic stenosis is present.     Pulmonic Valve  There is mild (1+) pulmonic valvular regurgitation. There is no pulmonic  valvular stenosis.     Vessels  The aortic root is normal size. Normal size ascending aorta. Dilation of the  inferior vena cava is present with abnormal respiratory variation in diameter.     Pericardium  There is no pericardial effusion.     Rhythm  The rhythm was atrial fibrillation.  ______________________________________________________________________________  MMode/2D Measurements & Calculations  IVSd: 0.81 cm  LVIDd: 4.8 cm  LVIDs: 3.3 cm  LVPWd: 0.87 cm  IVC diam: 3.4 cm  FS: 31.3 %  LV mass(C)d: 133.5 grams  LV mass(C)dI: 59.8 grams/m2  Ao root diam: 3.2 cm  asc Aorta Diam: 3.5 cm  LVOT diam: 1.7 cm  LVOT area: 2.3 cm2  Ao root diam index Ht(cm/m): 1.9  Ao root diam index BSA (cm/m2): 1.5  Asc Ao diam index BSA (cm/m2): 1.6  Asc Ao diam index Ht(cm/m): 2.0  EF Biplane: 60.2 %  LA Volume (BP): 71.2 ml     LA Volume Index (BP): 31.9 ml/m2  RWT: 0.36  TAPSE: 2.2 cm     Time Measurements  Aortic HR: 43.0 BPM     Doppler Measurements & Calculations  MV E max laz: 126.3 cm/sec  AI P1/2t: 691.8 msec  LV V1 max P.6 mmHg  LV V1 max: 107.0 cm/sec  LV V1 VTI: 27.5 cm  CO(LVOT): 2.7  "l/min  CI(LVOT): 1.2 l/min/m2  SV(LVOT): 62.8 ml  SI(LVOT): 28.2 ml/m2     PA acc time: 0.11 sec  TR max everett: 235.5 cm/sec  TR max P.3 mmHg  E/E' av.0  Lateral E/e': 8.5  Medial E/e': 9.4  RV S Everett: 10.6 cm/sec     ______________________________________________________________________________  Report approved by: June Blas 2024 02:48 PM             Echo:  No results found for this or any previous visit (from the past 4320 hour(s)).    Clinically Significant Risk Factors              # Hypoalbuminemia: Lowest albumin = 3.4 g/dL at 2024  6:32 AM, will monitor as appropriate   # Thrombocytopenia: Lowest platelets = 95 in last 2 days, will monitor for bleeding  # Acute Kidney Injury, unspecified: based on a >150% or 0.3 mg/dL increase in last creatinine compared to past 90 day average, will monitor renal function   # Chronic heart failure with preserved ejection fraction: heart failure noted on problem list and last echo with EF >50%       # Obesity: Estimated body mass index is 37.41 kg/m  as calculated from the following:    Height as of this encounter: 1.727 m (5' 8\").    Weight as of this encounter: 111.6 kg (246 lb 0.5 oz)., PRESENT ON ADMISSION           Cardiac Arrhythmia: Atrial fibrillation: Chronic        History of hepatic steatosis with evidence of portal hypertension.            CKD POA List: Stage 3b (GFR 30-44)                                "

## 2024-04-04 NOTE — PLAN OF CARE
"Goal Outcome Evaluation:      Plan of Care Reviewed With: patient    Overall Patient Progress: no change    Outcome Evaluation: Pt A/O x4, VSS, afebrile, on RA. C/o headache, PRN Florinal given 2x w/ some relief. Reports of dizziness when up, orthostatic BP wnl. No BM this shift. Enteric precautions discontinued per MD. Colorectal surgery consulted today, no interventions as of now. Warfarin resumed this PM. Monitor BG, on ISS. On IV Zosyn q6h for abscess. Assist of 1 to bathroom, Purewick in place, adequate output. Plan to discharge in 1-2 days pending improvement of condition.    Problem: Adult Inpatient Plan of Care  Goal: Plan of Care Review  Description: The Plan of Care Review/Shift note should be completed every shift.  The Outcome Evaluation is a brief statement about your assessment that the patient is improving, declining, or no change.  This information will be displayed automatically on your shift  note.  Outcome: Progressing  Flowsheets (Taken 4/3/2024 1911)  Outcome Evaluation: No c/o abdominal pain. Pt only has discomfort w/ palpation. Diet advanced to clear liquid, pt tolerated well. Reports of dizziness when up, orthostatic BP performed, VSS.  Plan of Care Reviewed With: patient  Overall Patient Progress: no change  Goal: Patient-Specific Goal (Individualized)  Description: You can add care plan individualizations to a care plan. Examples of Individualization might be:  \"Parent requests to be called daily at 9am for status\", \"I have a hard time hearing out of my right ear\", or \"Do not touch me to wake me up as it startles  me\".  Outcome: Progressing  Goal: Absence of Hospital-Acquired Illness or Injury  Outcome: Progressing  Intervention: Identify and Manage Fall Risk  Recent Flowsheet Documentation  Taken 4/3/2024 1601 by Erinn Alvarez, RN  Safety Promotion/Fall Prevention:   activity supervised   assistive device/personal items within reach   clutter free environment maintained   increased rounding " and observation   increase visualization of patient   lighting adjusted   nonskid shoes/slippers when out of bed   patient and family education   room door open   room near nurse's station   room organization consistent   safety round/check completed   supervised activity  Intervention: Prevent Skin Injury  Recent Flowsheet Documentation  Taken 4/3/2024 1601 by Erinn Alvarez RN  Body Position:   position changed independently   supine, head elevated  Device Skin Pressure Protection: absorbent pad utilized/changed  Intervention: Prevent and Manage VTE (Venous Thromboembolism) Risk  Recent Flowsheet Documentation  Taken 4/3/2024 1601 by Erinn Alvarez RN  VTE Prevention/Management: (wafarin restarted) SCDs (sequential compression devices) off  Intervention: Prevent Infection  Recent Flowsheet Documentation  Taken 4/3/2024 1601 by Erinn Alvarez RN  Infection Prevention:   hand hygiene promoted   equipment surfaces disinfected   environmental surveillance performed   rest/sleep promoted   single patient room provided  Goal: Optimal Comfort and Wellbeing  Outcome: Progressing  Intervention: Monitor Pain and Promote Comfort  Recent Flowsheet Documentation  Taken 4/3/2024 1852 by Erinn Alvarez RN  Pain Management Interventions:   declines   rest  Taken 4/3/2024 1802 by Erinn Alvarez RN  Pain Management Interventions: medication (see MAR)  Goal: Readiness for Transition of Care  Outcome: Progressing     Problem: Skin Injury Risk Increased  Goal: Skin Health and Integrity  Outcome: Progressing  Intervention: Plan: Nurse Driven Intervention: Moisture Management  Recent Flowsheet Documentation  Taken 4/3/2024 1601 by Erinn Alvarez RN  Moisture Interventions:   Incontinence pad   Urinary collection device  Intervention: Plan: Nurse Driven Intervention: Friction and Shear  Recent Flowsheet Documentation  Taken 4/3/2024 1601 by Erinn Alvarez RN  Friction/Shear Interventions: HOB 30 degrees or less  Intervention: Optimize Skin  Protection  Recent Flowsheet Documentation  Taken 4/3/2024 1601 by Erinn Alvarez RN  Activity Management: activity adjusted per tolerance  Head of Bed (HOB) Positioning: HOB at 20-30 degrees     Problem: Intestinal Obstruction  Goal: Optimal Bowel Function  Outcome: Progressing  Intervention: Promote Bowel Function  Recent Flowsheet Documentation  Taken 4/3/2024 1601 by Erinn Alvarez RN  Body Position:   position changed independently   supine, head elevated  Head of Bed (HOB) Positioning: HOB at 20-30 degrees  Positioning/Transfer Devices:   pillows   in use  Goal: Fluid and Electrolyte Balance  Outcome: Progressing  Goal: Absence of Infection Signs and Symptoms  Outcome: Progressing  Goal: Optimize Nutrition Status  Outcome: Progressing  Goal: Optimal Pain Control and Function  Outcome: Progressing  Intervention: Prevent or Manage Pain  Recent Flowsheet Documentation  Taken 4/3/2024 1852 by Erinn Alvarez RN  Pain Management Interventions:   declines   rest  Taken 4/3/2024 1802 by Erinn Alvarez RN  Pain Management Interventions: medication (see MAR)

## 2024-04-04 NOTE — PROGRESS NOTES
Discussed case with Dr. Dary Plunkett.  She feels that the patient would be appropriate for permanent pacemaker implantation given the findings on telemetry and the history of possible syncopal episode.  Did discuss with Dr. Owen, the patient's hospitalist, the issue of the abdominal diverticular abscess.  He feels that the patient is not septic at this time and is not bacteremic-her symptoms have improved so the chances would be low of infecting a pacemaker.  She will be continuing antibiotics.  Patient will be transferred to [facility] once a bed is available for permanent pacemaker implantation.  Dr. Plunkett has the patient's name and medical record number.

## 2024-04-05 ENCOUNTER — TELEPHONE (OUTPATIENT)
Dept: INTERNAL MEDICINE | Facility: CLINIC | Age: 84
End: 2024-04-05
Payer: COMMERCIAL

## 2024-04-05 ENCOUNTER — DOCUMENTATION ONLY (OUTPATIENT)
Dept: ANTICOAGULATION | Facility: CLINIC | Age: 84
End: 2024-04-05
Payer: COMMERCIAL

## 2024-04-05 ENCOUNTER — PRE VISIT (OUTPATIENT)
Dept: ONCOLOGY | Facility: HOSPITAL | Age: 84
End: 2024-04-05
Payer: COMMERCIAL

## 2024-04-05 DIAGNOSIS — Z95.0 CARDIAC PACEMAKER IN SITU: Primary | ICD-10-CM

## 2024-04-05 DIAGNOSIS — I48.20 CHRONIC ATRIAL FIBRILLATION (H): ICD-10-CM

## 2024-04-05 DIAGNOSIS — I48.91 ATRIAL FIBRILLATION (H): ICD-10-CM

## 2024-04-05 DIAGNOSIS — I48.21 PERMANENT ATRIAL FIBRILLATION (H): Primary | ICD-10-CM

## 2024-04-05 LAB
ANION GAP SERPL CALCULATED.3IONS-SCNC: 10 MMOL/L (ref 7–15)
ATRIAL RATE - MUSE: 76 BPM
BUN SERPL-MCNC: 14.1 MG/DL (ref 8–23)
CALCIUM SERPL-MCNC: 9 MG/DL (ref 8.8–10.2)
CHLORIDE SERPL-SCNC: 103 MMOL/L (ref 98–107)
CREAT SERPL-MCNC: 1.21 MG/DL (ref 0.51–0.95)
DEPRECATED HCO3 PLAS-SCNC: 27 MMOL/L (ref 22–29)
DIASTOLIC BLOOD PRESSURE - MUSE: NORMAL MMHG
EGFRCR SERPLBLD CKD-EPI 2021: 45 ML/MIN/1.73M2
ERYTHROCYTE [DISTWIDTH] IN BLOOD BY AUTOMATED COUNT: 14.1 % (ref 10–15)
GLUCOSE BLDC GLUCOMTR-MCNC: 106 MG/DL (ref 70–99)
GLUCOSE BLDC GLUCOMTR-MCNC: 144 MG/DL (ref 70–99)
GLUCOSE BLDC GLUCOMTR-MCNC: 164 MG/DL (ref 70–99)
GLUCOSE BLDC GLUCOMTR-MCNC: 93 MG/DL (ref 70–99)
GLUCOSE SERPL-MCNC: 110 MG/DL (ref 70–99)
HCT VFR BLD AUTO: 35.8 % (ref 35–47)
HGB BLD-MCNC: 11.9 G/DL (ref 11.7–15.7)
INR PPP: 3.01 (ref 0.85–1.15)
INTERPRETATION ECG - MUSE: NORMAL
MCH RBC QN AUTO: 30.9 PG (ref 26.5–33)
MCHC RBC AUTO-ENTMCNC: 33.2 G/DL (ref 31.5–36.5)
MCV RBC AUTO: 93 FL (ref 78–100)
P AXIS - MUSE: NORMAL DEGREES
PLATELET # BLD AUTO: 100 10E3/UL (ref 150–450)
POTASSIUM SERPL-SCNC: 3.8 MMOL/L (ref 3.4–5.3)
PR INTERVAL - MUSE: NORMAL MS
QRS DURATION - MUSE: 112 MS
QT - MUSE: 438 MS
QTC - MUSE: 440 MS
R AXIS - MUSE: -37 DEGREES
RBC # BLD AUTO: 3.85 10E6/UL (ref 3.8–5.2)
SODIUM SERPL-SCNC: 140 MMOL/L (ref 135–145)
SYSTOLIC BLOOD PRESSURE - MUSE: NORMAL MMHG
T AXIS - MUSE: 128 DEGREES
VENTRICULAR RATE- MUSE: 61 BPM
WBC # BLD AUTO: 3.1 10E3/UL (ref 4–11)

## 2024-04-05 PROCEDURE — 99153 MOD SED SAME PHYS/QHP EA: CPT | Performed by: INTERNAL MEDICINE

## 2024-04-05 PROCEDURE — 250N000013 HC RX MED GY IP 250 OP 250 PS 637: Performed by: INTERNAL MEDICINE

## 2024-04-05 PROCEDURE — 250N000011 HC RX IP 250 OP 636: Performed by: INTERNAL MEDICINE

## 2024-04-05 PROCEDURE — 258N000003 HC RX IP 258 OP 636: Performed by: STUDENT IN AN ORGANIZED HEALTH CARE EDUCATION/TRAINING PROGRAM

## 2024-04-05 PROCEDURE — 85610 PROTHROMBIN TIME: CPT | Performed by: INTERNAL MEDICINE

## 2024-04-05 PROCEDURE — 210N000002 HC R&B HEART CARE

## 2024-04-05 PROCEDURE — 33207 INSERT HEART PM VENTRICULAR: CPT | Mod: KX | Performed by: INTERNAL MEDICINE

## 2024-04-05 PROCEDURE — 80048 BASIC METABOLIC PNL TOTAL CA: CPT | Performed by: INTERNAL MEDICINE

## 2024-04-05 PROCEDURE — 0JH604Z INSERTION OF PACEMAKER, SINGLE CHAMBER INTO CHEST SUBCUTANEOUS TISSUE AND FASCIA, OPEN APPROACH: ICD-10-PCS | Performed by: INTERNAL MEDICINE

## 2024-04-05 PROCEDURE — 99222 1ST HOSP IP/OBS MODERATE 55: CPT | Mod: 57 | Performed by: INTERNAL MEDICINE

## 2024-04-05 PROCEDURE — C1887 CATHETER, GUIDING: HCPCS | Performed by: INTERNAL MEDICINE

## 2024-04-05 PROCEDURE — 272N000001 HC OR GENERAL SUPPLY STERILE: Performed by: INTERNAL MEDICINE

## 2024-04-05 PROCEDURE — 36415 COLL VENOUS BLD VENIPUNCTURE: CPT | Performed by: INTERNAL MEDICINE

## 2024-04-05 PROCEDURE — 250N000009 HC RX 250: Performed by: INTERNAL MEDICINE

## 2024-04-05 PROCEDURE — 02HK3JZ INSERTION OF PACEMAKER LEAD INTO RIGHT VENTRICLE, PERCUTANEOUS APPROACH: ICD-10-PCS | Performed by: INTERNAL MEDICINE

## 2024-04-05 PROCEDURE — 99152 MOD SED SAME PHYS/QHP 5/>YRS: CPT | Performed by: INTERNAL MEDICINE

## 2024-04-05 PROCEDURE — 93005 ELECTROCARDIOGRAM TRACING: CPT

## 2024-04-05 PROCEDURE — C1786 PMKR, SINGLE, RATE-RESP: HCPCS | Performed by: INTERNAL MEDICINE

## 2024-04-05 PROCEDURE — 85048 AUTOMATED LEUKOCYTE COUNT: CPT | Performed by: INTERNAL MEDICINE

## 2024-04-05 PROCEDURE — C1898 LEAD, PMKR, OTHER THAN TRANS: HCPCS | Performed by: INTERNAL MEDICINE

## 2024-04-05 PROCEDURE — C1894 INTRO/SHEATH, NON-LASER: HCPCS | Performed by: INTERNAL MEDICINE

## 2024-04-05 PROCEDURE — 99233 SBSQ HOSP IP/OBS HIGH 50: CPT | Performed by: STUDENT IN AN ORGANIZED HEALTH CARE EDUCATION/TRAINING PROGRAM

## 2024-04-05 PROCEDURE — 250N000012 HC RX MED GY IP 250 OP 636 PS 637: Performed by: INTERNAL MEDICINE

## 2024-04-05 PROCEDURE — 250N000013 HC RX MED GY IP 250 OP 250 PS 637

## 2024-04-05 DEVICE — PACEMAKER AZURE XT SR MRI SYS: Type: IMPLANTABLE DEVICE | Status: FUNCTIONAL

## 2024-04-05 DEVICE — LEAD PACEMAKER SELECTSECURE 69CM MD  383069: Type: IMPLANTABLE DEVICE | Status: FUNCTIONAL

## 2024-04-05 RX ORDER — LIDOCAINE 40 MG/G
CREAM TOPICAL
Status: DISCONTINUED | OUTPATIENT
Start: 2024-04-05 | End: 2024-04-05 | Stop reason: HOSPADM

## 2024-04-05 RX ORDER — WARFARIN SODIUM 2 MG/1
2 TABLET ORAL ONCE
Status: COMPLETED | OUTPATIENT
Start: 2024-04-05 | End: 2024-04-05

## 2024-04-05 RX ORDER — CEFAZOLIN SODIUM 2 G/100ML
2 INJECTION, SOLUTION INTRAVENOUS EVERY 8 HOURS
Qty: 100 ML | Refills: 0 | Status: COMPLETED | OUTPATIENT
Start: 2024-04-05 | End: 2024-04-05

## 2024-04-05 RX ORDER — CEFAZOLIN SODIUM 2 G/100ML
2 INJECTION, SOLUTION INTRAVENOUS
Status: DISCONTINUED | OUTPATIENT
Start: 2024-04-05 | End: 2024-04-05 | Stop reason: HOSPADM

## 2024-04-05 RX ORDER — NICOTINE POLACRILEX 4 MG
15-30 LOZENGE BUCCAL
Status: DISCONTINUED | OUTPATIENT
Start: 2024-04-05 | End: 2024-04-08 | Stop reason: HOSPADM

## 2024-04-05 RX ORDER — BUPIVACAINE HYDROCHLORIDE 2.5 MG/ML
INJECTION, SOLUTION EPIDURAL; INFILTRATION; INTRACAUDAL
Status: DISCONTINUED | OUTPATIENT
Start: 2024-04-05 | End: 2024-04-05 | Stop reason: HOSPADM

## 2024-04-05 RX ORDER — CEFAZOLIN SODIUM 2 G/100ML
INJECTION, SOLUTION INTRAVENOUS CONTINUOUS PRN
Status: COMPLETED | OUTPATIENT
Start: 2024-04-05 | End: 2024-04-05

## 2024-04-05 RX ORDER — FENTANYL CITRATE 50 UG/ML
INJECTION, SOLUTION INTRAMUSCULAR; INTRAVENOUS
Status: DISCONTINUED | OUTPATIENT
Start: 2024-04-05 | End: 2024-04-05 | Stop reason: HOSPADM

## 2024-04-05 RX ORDER — SODIUM CHLORIDE, SODIUM LACTATE, POTASSIUM CHLORIDE, CALCIUM CHLORIDE 600; 310; 30; 20 MG/100ML; MG/100ML; MG/100ML; MG/100ML
INJECTION, SOLUTION INTRAVENOUS CONTINUOUS
Status: ACTIVE | OUTPATIENT
Start: 2024-04-05 | End: 2024-04-06

## 2024-04-05 RX ORDER — SODIUM CHLORIDE 450 MG/100ML
INJECTION, SOLUTION INTRAVENOUS CONTINUOUS
Status: DISCONTINUED | OUTPATIENT
Start: 2024-04-05 | End: 2024-04-05 | Stop reason: HOSPADM

## 2024-04-05 RX ORDER — DEXTROSE MONOHYDRATE 25 G/50ML
25-50 INJECTION, SOLUTION INTRAVENOUS
Status: DISCONTINUED | OUTPATIENT
Start: 2024-04-05 | End: 2024-04-08 | Stop reason: HOSPADM

## 2024-04-05 RX ADMIN — WARFARIN SODIUM 2 MG: 2 TABLET ORAL at 18:46

## 2024-04-05 RX ADMIN — PIPERACILLIN AND TAZOBACTAM 2.25 G: 2; .25 INJECTION, POWDER, FOR SOLUTION INTRAVENOUS at 05:32

## 2024-04-05 RX ADMIN — INSULIN ASPART 1 UNITS: 100 INJECTION, SOLUTION INTRAVENOUS; SUBCUTANEOUS at 18:47

## 2024-04-05 RX ADMIN — ACETAMINOPHEN 650 MG: 325 TABLET, FILM COATED ORAL at 08:06

## 2024-04-05 RX ADMIN — INSULIN ASPART 1 UNITS: 100 INJECTION, SOLUTION INTRAVENOUS; SUBCUTANEOUS at 21:20

## 2024-04-05 RX ADMIN — PIPERACILLIN AND TAZOBACTAM 2.25 G: 2; .25 INJECTION, POWDER, FOR SOLUTION INTRAVENOUS at 19:57

## 2024-04-05 RX ADMIN — CEFAZOLIN SODIUM 2 G: 2 INJECTION, SOLUTION INTRAVENOUS at 18:47

## 2024-04-05 RX ADMIN — PIPERACILLIN AND TAZOBACTAM 2.25 G: 2; .25 INJECTION, POWDER, FOR SOLUTION INTRAVENOUS at 14:06

## 2024-04-05 RX ADMIN — FAMOTIDINE 20 MG: 20 TABLET ORAL at 08:06

## 2024-04-05 RX ADMIN — Medication 1 MG: at 00:05

## 2024-04-05 RX ADMIN — SODIUM CHLORIDE, POTASSIUM CHLORIDE, SODIUM LACTATE AND CALCIUM CHLORIDE: 600; 310; 30; 20 INJECTION, SOLUTION INTRAVENOUS at 14:49

## 2024-04-05 RX ADMIN — ESCITALOPRAM OXALATE 5 MG: 5 TABLET, FILM COATED ORAL at 21:19

## 2024-04-05 ASSESSMENT — ACTIVITIES OF DAILY LIVING (ADL)
ADLS_ACUITY_SCORE: 50
ADLS_ACUITY_SCORE: 40
ADLS_ACUITY_SCORE: 50
ADLS_ACUITY_SCORE: 50
ADLS_ACUITY_SCORE: 38
ADLS_ACUITY_SCORE: 46
ADLS_ACUITY_SCORE: 50
ADLS_ACUITY_SCORE: 40
ADLS_ACUITY_SCORE: 50
ADLS_ACUITY_SCORE: 50
ADLS_ACUITY_SCORE: 40
ADLS_ACUITY_SCORE: 50
ADLS_ACUITY_SCORE: 40
ADLS_ACUITY_SCORE: 50
ADLS_ACUITY_SCORE: 44
ADLS_ACUITY_SCORE: 50
ADLS_ACUITY_SCORE: 40
ADLS_ACUITY_SCORE: 50
ADLS_ACUITY_SCORE: 40
ADLS_ACUITY_SCORE: 50
ADLS_ACUITY_SCORE: 50

## 2024-04-05 NOTE — PHARMACY-ADMISSION MEDICATION HISTORY
Admission medication history completed at [facility]. Please see Pharmacist Admission Medication History note from 4/1/2024.

## 2024-04-05 NOTE — PROGRESS NOTES
Writer attempted to complete initial consult with patient, but patient not able to speak with writer at this time.  Patient did give writer contact information for her homecare RN Raj ([phone]) who has been assisting her with med management for many years.  Raj is employed by [employer], which does NOT provide any type of PT or OT services.  If Homecare PT is recommended, patient would need to have all services from 1 agency.  Patient has declined home PT in the past because she has not wanted to stop receiving RN services from [facility].     Sabrina Horowitz RN Care Coordinator  [facility]  [phone]  ]

## 2024-04-05 NOTE — DISCHARGE INSTRUCTIONS
Discharge Instructions for Pacemaker Implantation    You have had a procedure to insert a pacemaker. Once inside your body, this small electronic device helps keep your heart from beating too slowly. A pacemaker can t fix existing heart problems. But it can help you feel better and have more energy. As you recover, follow all of the instructions you are given, including those below.    Activity  Follow the instructions you are given about limiting your activity.  Do not raise your affected arm above shoulder level for 2 weeks (until 4/19/24). This gives the device lead wires time to attach securely inside your heart.  For strenuous sports such as Tennis-Pickle kycf-oywjscupnj-bnvl-weight lifting wait 4 weeks to ensure stable lead healing.  Ask your doctor when you can expect to return to work.  You can still exercise. It s good for your body and your heart. Talk with your doctor about an exercise plan.    Other Precautions  Follow your doctor's directions carefully for wound care. You may remove the outer dressing in 3 days (on 4/8-4/9/24). Leave the steri-strips in place; these will be removed at your 1 week follow-up. Never put any creams, lotions, or products like peroxide on an incision unless your doctor tells you to.   You may shower once the outer dressing has been removed.   Before you receive any treatment, tell all health care providers (including your dentist) that you have a pacemaker.  You will be given an ID card that contains information about your pacemaker. Always carry this card with you. You can show this card if your pacemaker sets off a metal detector. You should also show it to avoid screening with a hand-held security wand.  Keep your cell phone away from your pacemaker. Don t carry the phone in your shirt pocket, even when it s turned off.  Avoid strong magnets. Examples are those used in MRIs or in hand-held security wands.  Avoid strong electrical fields. Examples are those made by radio  transmitting towers,  ham  radios, and heavy-duty electrical equipment.  Avoid leaning over the open garcia of a running car. A running engine creates an electrical field. Most household and yard appliances will not cause any problems. If you use any large power tools, such as an industrial , talk with your doctor.     Follow-Up  Follow up in the device clinic as scheduled.   You have an appointment scheduled on 4/16/24 at 11:00am at [facility] in [place], [address] [place] [address], [address], [place] [address]  Make regular follow-up appointments with your device clinic. They will check the pacemaker to make sure it s working properly.    When to Call Your Device Clinic [phone]  Call your Device Clinic if you have any of the following:  Dizziness  Chest pain  Lack of energy  Fainting spells  Rapid pulse or pounding heartbeat  Shortness of breath  Increased pain around your pacemaker  Fever above 100.4 F (38 C) or other signs of infection (redness, swelling, drainage, or warmth at the incision site)     4010-5522 [employer]. [address] [place] [address]. All rights reserved. This information is not intended as a substitute for professional medical care. Always follow your healthcare professional's instructions.    [facility] in [place]: [phone]  Device Clinic (Monday to Friday, 8am-4pm): [phone]  *The device clinic is closed on weekends and holidays.  Any calls received during this time will be answered on the next business  day. For any urgent questions after hours, please call the main clinic number and you will be put in contact with the cardiologist on call.

## 2024-04-05 NOTE — TELEPHONE ENCOUNTER
Fax received from [facility] week of 03/17/24 to 03/23/24. for review and signature.  Put in Dr. Lauren's in basket.

## 2024-04-05 NOTE — PRE-PROCEDURE
GENERAL PRE-PROCEDURE:   Procedure:  Pacemaker implant    Written consent obtained?: Yes    Risks and benefits: Risks, benefits and alternatives were discussed    Consent given by:  Patient  Patient states understanding of procedure being performed: Yes    Patient's understanding of procedure matches consent: Yes    Procedure consent matches procedure scheduled: Yes    Expected level of sedation:  Moderate  Appropriately NPO:  Yes  ASA Class:  3  Mallampati  :  Grade 2- soft palate, base of uvula, tonsillar pillars, and portion of posterior pharyngeal wall visible  Lungs:  Lungs clear with good breath sounds bilaterally  Heart:  A-fib  History & Physical reviewed:  History and physical reviewed and no updates needed  Statement of review:  I have reviewed the lab findings, diagnostic data, medications, and the plan for sedation

## 2024-04-05 NOTE — PROGRESS NOTES
"ANTICOAGULATION  MANAGEMENT: Discharge Review    Savanna Rehman chart reviewed for anticoagulation continuity of care    Hospital Admission on 4/1-4/4/24 for bradycardia, a.fib with SVR, and acute diverticulitis.    Discharge disposition: Transferred hospitals to [facility], currently admitted at that facility    Results:    Recent labs: (last 7 days)     04/01/24  1625 04/02/24  1719 04/03/24  0721 04/04/24  0659   INR 2.12* 2.04* 1.99* 2.55*     Anticoagulation inpatient management:     4/1- held  4/2-3- 4 mg  4/4- held    Anticoagulation discharge instructions:     Warfarin dosing:  Holding warfarin in anticipation of possible pacemaker placement   Bridging: No   INR goal change: No      Medication changes affecting anticoagulation: Yes: IV zosyn    Additional factors affecting anticoagulation: Yes: CT imaging shows a \"nonspecific fluid collection in the deep subcutaneous fat over the right buttocks region measuring 7.8 x 3.0 x 5.9 cm.  Likely due to hematoma\" this as been present for months due to prior fall.     PLAN     No adjustment to anticoagulation plan needed    Recommended follow up is scheduled, will follow up when discharged from [facility]    Anticoagulation Calendar updated    Iris Kemp RN  "

## 2024-04-05 NOTE — PROGRESS NOTES
[facility]    Patient is currently receiving services with [facility]. The patient is currently receiving PT services.  Patient's  and home health team have been notified that patient is under inpatient status [facility] Liaison will continue to follow patient during stay. Please provide orders to resume home care at time of discharge if appropriate.

## 2024-04-05 NOTE — PLAN OF CARE
Pt here with N,V,D. A&Ox4. Neuros intact. VSS. Tele Afib SVR. NPO diet. Up with A1 GWB to bedside. Tylenol given for headache pain. Pt scoring green on the Aggression Stop Light Tool. Plan for EP & colorectal surgery consult and pacer placement. Discharge pending.

## 2024-04-05 NOTE — CONSULTS
COLON & RECTAL SURGERY  PROGRESS NOTE    April 5, 2024    SUBJECTIVE:  Transferred to [facility] last night for pacemaker placement. Doing well from abdominal standpoint, denies pain, was tolerating FLD, having loose BMs. Again confirms she would not want surgery for diverticulitis if it were to become necessary. Afebrile. WBC 3.1.     OBJECTIVE:  Temp:  [97.5  F (36.4  C)-98  F (36.7  C)] 97.7  F (36.5  C)  Pulse:  [42-61] 54  Resp:  [7-31] 24  BP: (119-141)/(43-70) 141/67  SpO2:  [93 %-99 %] 96 %  No intake or output data in the 24 hours ending 04/05/24 1015    GENERAL:  Awake, alert, no acute distress  HEAD: Normocephalic atraumatic  SCLERA: Anicteric  EXTREMITIES: Warm and well perfused  ABDOMEN:  Soft, minimally tender in LLQ, non-distended. No guarding, rigidity, or peritoneal signs.     LABS:  Lab Results   Component Value Date    WBC 3.1 04/05/2024    WBC 6.1 02/22/2021     Lab Results   Component Value Date    HGB 11.9 04/05/2024    HGB 14.7 02/22/2021     Lab Results   Component Value Date    HCT 35.8 04/05/2024    HCT 44.5 02/22/2021     Lab Results   Component Value Date     04/05/2024     02/22/2021     Last Basic Metabolic Panel:  Lab Results   Component Value Date     04/04/2024     02/22/2021      Lab Results   Component Value Date    POTASSIUM 4.0 04/04/2024    POTASSIUM 4.6 11/07/2022    POTASSIUM 4.2 02/22/2021     Lab Results   Component Value Date    CHLORIDE 100 04/04/2024    CHLORIDE 105 11/07/2022    CHLORIDE 103 02/22/2021     Lab Results   Component Value Date    SAUL 8.5 04/04/2024    SAUL 9.9 02/22/2021     Lab Results   Component Value Date    CO2 26 04/04/2024    CO2 29 11/07/2022    CO2 29 02/22/2021     Lab Results   Component Value Date    BUN 18.4 04/04/2024    BUN 17 11/07/2022    BUN 14 02/22/2021     Lab Results   Component Value Date    CR 1.65 04/04/2024    CR 0.97 02/22/2021     Lab Results   Component Value Date     04/05/2024     11/07/2022      02/22/2021       ASSESSMENT/PLAN: 83 year old woman with FERREIRA cirrhosis c/b portal hypertension, atrial fibrillation (on Coumadin), HTN, CHF, diabetes, and CRISTIANA who was admitted on 4/1/24 for diverticulitis with abscess. Improving with conservative management. Transferred to [facility] for pacemaker placement     - NPO for procedure. Ok for low fiber diet afterward from our standpoint.  - Continue IV Abx for diverticulitis, can transition to PO closer to discharge, recommend 14 day course given abscess  - She is improving with conservative management. No indication for surgery for diverticulitis currently, not a candidate for surgery at [facility] given cirrhosis, and she again confirmed she would not want surgery. For those reasons we will sign off, call with questions/concerns.     Discussed with Dr. Stafford.    For questions/paging, please contact the CRS office at [phone].    Vinnie Richard PA-C  Colorectal Physician Assistant    [facility]  [address] [address]  T: [phone]  F: [phone]

## 2024-04-05 NOTE — PROCEDURES
"PROCEDURES PERFORMED:  - Implantation of single-chamber permanent pacemaker with left bundle pacing  - Cardiac fluoroscopy  - Conscious sedation, mild-moderate level    INDICATIONS:  Symptomatic bradycardia, chronic persistent atrial fibrillation      PROCEDURE:  The benefits and risks of the procedure were discussed with the patient in detail; the patient expressed understanding.  Informed consent was obtained and placed in the chart.  I determined this patient to be an appropriate candidate for the planned sedation and procedure and have reassessed the patient immediately prior to sedation and procedure. The patient was brought to the EP lab in the fasting, non-sedated state.  We continuously monitored EKG, vital signs, oxygenation and level of sedation.  I was present during the entire time that conscious sedation was provided.  Antibiotic prophylaxis was administered.  The chest was prepped and draped in the usual sterile fashion.      Local anesthetic was administered.  Access to the axillary vein was obtained with ultrasound guidance using the modified Seldinger technique.  One wire was advanced to the IVC.  An incision was made in the left pectoral area.  Dissection was carried down to the myofascial plane and then continued caudally to form the pocket.  Adequate hemostasis was obtained.      One 7.0 Fr sheath was introduced for the ventricular lead.  A His sheath was advanced over a long wire into the mid right ventricle.  The pacemaker lead was advanced through the sheath.  Pacing here showed an initial \"W\" pattern in lead V1.  The lead was screwed in incrementally under fluoroscopy and checked at each step.  The pacing morphology was not ideal so the lead was retracted.  The sheath was repositioned to a slightly more proximal location on the septum and the lead was advanced through the sheath.  Pacing here again showed an initial \"W\" pattern.  The lead was screwed in incrementally and pacing parameters " were checked at each step.  LVAT was measured at 71 ms. The lead had sensing, impedance, and threshold.  The sheathes were peeled away and both leads were secured to the pectoral muscle using Ethibond sutures.    Normal saline was used to irrigate the pocket. The generator was securely attached to the lead, placed in a Tyrx antimicrobial pouch, and then placed in the pocket. The device was sutured in the pocket with an Ethibond  suture. The pocket was closed in layers.  The deep layers were closed using 2.0 and 3.0 Vicryl and the subcuticular layer was closed with 4.0 Vicryl suture. Dermabond was applied to the skin. The incision was covered with a sterile dressing.    There was minimal blood loss (<30 mL) and no immediate complications.  The patient tolerated the procedure well.    Implanted Hardware and Parameters:  Implant Name Type Inv. Item Serial No.  Lot No. LRB No. Used Action   LEAD PACEMAKER SELECTSECURE 69CM MD  3830-69 - [id number] Leads LEAD PACEMAKER SELECTSECURE 69CM MD  3830-69 [id number] [employer] [id number]  1 Implanted   PACEMAKER DOMINIC XT SR MRI SYS - [id number] Pacemaker PACEMAKER DOMINIC XT SR MRI SYS [id number] [employer] [id number]  1 Implanted           CONCLUSIONS:  - Successful implantation of a single-chamber permanent pacemaker with left bundle pacing  - Device check and chest X-ray tomorrow morning   - Okay to continue PO anticoagulation   - Routine follow up after discharge      Raul Plunkett MD

## 2024-04-05 NOTE — PLAN OF CARE
Goal Outcome Evaluation:      Plan of Care Reviewed With: patient, family    Overall Patient Progress: no change    Outcome Evaluation: Continues to have dizziness. HR 40-50s. Tele: A-fib SVR w/ prolonged QT and frequent pauses. Denies chest pain, N/V, SOB. Echo today, EF 55-60%. Transfer out to [facility] for possible pacemaker placement. Warfarin held this PM per MD order. PRN Fiorinal given 1x prior to transfer for headache. All belongings returned to pt. Family updated. Pt transferred @ 2040.

## 2024-04-05 NOTE — CONSULTS
[facility]    Electrophysiology Consultation     Date of Admission:  4/4/2024  Date of Consult: 04/05/24    Assessment & Plan   Savanna Rehman is a 83 year old female who was admitted on 4/4/2024. We were asked to see the patient for bradycardia and pauses.  Echocardiogram shows preserved LV function.  She has persistent atrial fibrillation with rates currently 30-40's on telemetry.  She had pauses up to 3.2 seconds while at [facility].  She did have a suspicious episode of syncope the night prior but was unfortunately not on telemetry at the time.    We discussed the findings and treatment with a pacemaker.  I explained the purpose of the device and implant procedure including risks and benefits.  She was interested in proceeding and formal written consent was obtained.      - Plan for single chamber pacemaker with left bundle pacing this morning  - Keep NPO, avoid any IV/subcutaneous anticoagulation, okay to continue warfarin      Raul Plunkett MD        Code Status    Full Code    Reason for Consult   Reason for consult: Consult performed at the request of the attending physician, Rashid Scanlon MD, for evaluation of atrial fibrillation with slow ventricular response      Chief Complaint   Diverticulitis     History is obtained from the patient and EPIC chart.      History of Present Illness   Savanna Rehman is a 83 year old female with type 2 diabetes, hypertension, CRISTIANA, HFpEF, chronic persistent atrial fibrillation who presents with nausea/vomiting and abdominal pain, found to have diverticulitis and abscess.  She remains on piperacillin-tazobactam for treatment.  After admission she had episodes of lightheadedness and a brief syncopal episode which was thought to be due to atrial fibrillation with slow ventricular rates.  She was noted to have significant bradycardia with pauses up to 3.2 seconds while awake after she was placed on telemetry.    She recalls sitting in her  hospital bed, leaning over the table and reading the evening before last.  She felt lightheaded and then next thing she knew she was laying back on the bed.  She states she is still having episodes of lightheadedness, no further loss of consciousness.  She has been falling frequently, reports 7 falls over the past year.  She is states it is a combination of unsteadiness due to her peripheral neuropathy and maybe some dizziness/lightheadedness.  It is not clear if she has passed out with any of the falls.   She states she has chronic GI issues with recurrent diarrhea which she takes Imodium for.  Her symptoms have improved significantly since coming into the hospital and she only has abdominal pain when they push on it for exams.      Discussed her medication allergies.  She states most of her reactions are itching and rashes.  She had itching with fentanyl in the past and thinks she had a rash with lidocaine.      Past Medical History   I have reviewed this patient's medical history and updated it with pertinent information if needed.   Past Medical History:   Diagnosis Date    Acute encephalopathy 09/21/2023    Anemia     Iron Deficiency anemia    Atrial fibrillation (H)     CAD (coronary artery disease)     non-obstructive    Chronic pain     neck, low back, legs    Congestive heart failure (H)     Degenerative disk disease     Diabetes (H)     Fibromyalgia     Gastro-oesophageal reflux disease     Gout     Hiatal hernia     Mumps     Neuropathy     CRISTIANA (obstructive sleep apnea) - CPAP     Palpitations     Pernicious anemia     Sleep apnea     uses CPAP.    Urinary incontinence     Vitamin D deficiency        Past Surgical History   I have reviewed this patient's surgical history and updated it with pertinent information if needed.  Past Surgical History:   Procedure Laterality Date    APPENDECTOMY      CHOLECYSTECTOMY      COLONOSCOPY  3/15/2011    COLONOSCOPY N/A 3/15/2023    Procedure: COLONOSCOPY, WITH  POLYPECTOMY AND BIOPSY;  Surgeon: Maci Coronel MD;  Location:  GI    COLONOSCOPY N/A 3/15/2023    Procedure: COLONOSCOPY, FLEXIBLE, WITH LESION REMOVAL USING SNARE;  Surgeon: Maci Coronel MD;  Location:  GI    CORONARY ANGIOGRAPHY ADULT ORDER      CYSTOSCOPY, BIOPSY BLADDER, COMBINED N/A 7/13/2020    Procedure: CYSTOSCOPY, WITH BLADDER BIOPSY;  Surgeon: Deisy Wilson MD;  Location: [facility] OR    HEART CATH LEFT HEART CATH  12/30/16    medication management    HYSTERECTOMY TOTAL ABDOMINAL      Knee replacement NOS Left     LAPAROSCOPIC NISSEN FUNDOPLICATION N/A 2/4/2015    Procedure: LAPAROSCOPIC NISSEN FUNDOPLICATION;  Surgeon: Armando Ansari MD;  Location:  OR    TONSILLECTOMY      TRANSPOSITION ULNAR NERVE (ELBOW)         Prior to Admission Medications   Prior to Admission Medications   Prescriptions Last Dose Informant Patient Reported? Taking?   Cholecalciferol (VITAMIN D-3) 125 MCG (5000 UT) TABS 4/1/2024 at am  No Yes   Sig: Take 5,000 Units by mouth daily   acetaminophen (TYLENOL) 500 MG tablet Past Week  Yes Yes   Sig: Take 1,000 mg by mouth every 6 hours as needed for mild pain   escitalopram (LEXAPRO) 5 MG tablet 4/3/2024 at pm  No Yes   Sig: Take 1 tablet by mouth once daily   famotidine (PEPCID) 20 MG tablet 4/1/2024  Yes Yes   Sig: Take 20 mg by mouth 2 times daily   furosemide (LASIX) 20 MG tablet 4/3/2024  No Yes   Sig: Take 1 tablet (20 mg) by mouth daily   glipiZIDE (GLUCOTROL XL) 2.5 MG 24 hr tablet 4/1/2024  No Yes   Sig: Take 1 tablet by mouth twice daily   indapamide (LOZOL) 2.5 MG tablet 4/1/2024  No Yes   Sig: Take 1 tablet (2.5 mg) by mouth every morning   loperamide (IMODIUM) 2 MG capsule 3/31/2024  No Yes   Sig: TAKE 1 CAPSULE BY MOUTH 4 TIMES DAILY AS NEEDED FOR DIARRHEA   warfarin ANTICOAGULANT (COUMADIN) 2.5 MG tablet 4/1/2024  Yes Yes   Sig: Take 2.5 mg by mouth once a week On Monday   warfarin ANTICOAGULANT (COUMADIN) 2.5 MG tablet 4/3/2024  Yes Yes   Sig:  Take 3.75 mg by mouth six times a week Daily except on Mondays      Facility-Administered Medications: None         Medications   Current Facility-Administered Medications   Medication Dose Route Frequency Provider Last Rate Last Admin    lactated ringers infusion   Intravenous Continuous Rashid Scanlon  mL/hr at 04/04/24 2357 New Bag at 04/04/24 2357    Patient is already receiving anticoagulation with heparin, enoxaparin (LOVENOX), warfarin (COUMADIN)  or other anticoagulant medication   Does not apply Continuous PRN Rashid Scanlon MD         Current Facility-Administered Medications   Medication Dose Route Frequency Provider Last Rate Last Admin    escitalopram (LEXAPRO) tablet 5 mg  5 mg Oral At Bedtime Rashid Scanlon MD   5 mg at 04/04/24 2343    famotidine (PEPCID) tablet 20 mg  20 mg Oral Daily Rashid Scanlon MD   20 mg at 04/05/24 0806    insulin aspart (NovoLOG) injection (RAPID ACTING)  1-7 Units Subcutaneous Q4H Rashid Scanlon MD        piperacillin-tazobactam (ZOSYN) 2.25 g vial to attach to  ml bag  2.25 g Intravenous Q6H Rashid Scanlon  mL/hr at 04/04/24 2345 2.25 g at 04/05/24 0532    sodium chloride (PF) 0.9% PF flush 3 mL  3 mL Intracatheter Q8H Rashid Scanlon MD        sodium chloride (PF) 0.9% PF flush 3 mL  3 mL Intracatheter Q8H Rashid Scanlon MD   3 mL at 04/05/24 0005       Allergies   Allergies   Allergen Reactions    Augmented Betamethasone Diprop [Betamethasone] Other (See Comments)     Severe yeast infection    Petroleum Jelly [Petrolatum] Anaphylaxis     Rash and swelling    Shellfish-Derived Products Anaphylaxis     Tongue swelling    Aspirin Swelling     tiongue swelling    Bacitracin      Rash swelling    Bactrim [Sulfamethoxazole-Trimethoprim] Dizziness    Coumadin [Warfarin] Swelling     Leg swelling    Darvon [Propoxyphene] Swelling     Throat closes    Dilaudid [Hydromorphone]      No side effects  from fentanyl June 2023    Levaquin [Levofloxacin] Swelling     Tongue swelling    Lidocaine     Morphine Unknown     [facility] 2/23[facility]24    Neomycin Swelling     rash    Neosporin [Neomycin-Polymyxin-Gramicidin] Swelling     rash    Nitrofurantoin      SOB, GI upset,    Oxycodone      Severe itching    Percocet [Oxycodone-Acetaminophen] Unknown    Percodan [Oxycodone-Aspirin]      Severe itching    Polymyxin B     Pramoxine     Tramadol     Vicodin [Hydrocodone-Acetaminophen]      Severe itching      Xarelto [Rivaroxaban]     Adhesive Tape Rash     Band aids     Codeine Rash    Hydrocortisone Rash and Swelling    Other Environmental Allergy Rash     Adhesive tape   Band aids        Social History   I have updated and reviewed the following Social History Narrative:   Social History     Social History Narrative    Not on file        Family History   I have reviewed this patient's family history and updated it with pertinent information if needed.   Family History   Problem Relation Age of Onset    Alzheimer Disease Mother     Lung Cancer Father     No Known Problems Brother     No Known Problems Brother     No Known Problems Brother     Unknown/Adopted No family hx of        Review of Systems   A complete 10-point review of systems was done and is negative with the exceptions noted in HPI.      Physical Exam   Temp: 97.7  F (36.5  C) Temp src: Oral BP: (!) 141/67 Pulse: 54   Resp: 24 SpO2: 96 % O2 Device: None (Room air)    Vital Signs with Ranges  Temp:  [97.5  F (36.4  C)-98  F (36.7  C)] 97.7  F (36.5  C)  Pulse:  [42-61] 54  Resp:  [7-31] 24  BP: (119-141)/(43-70) 141/67  SpO2:  [93 %-99 %] 96 %  0 lbs 0 oz    General: Pleasant, elderly lady, no acute distress  Resp: Breathing comfortably   Card: Irregular, bradycardic   Neuro: Awake, alert, answers questions appropriately       Diagnostics:  I personally reviewed the patient's EKG, lab work, and pertinent imaging    Recent Labs   Lab Test  04/04/24  0659 04/03/24  0721 04/02/24  0632 09/22/23  0553 09/21/23  1037   WBC 3.6* 3.3* 4.2   < >  --    HGB 12.1 11.6* 11.7   < >  --    HCT 35.9 35.3 35.7   < >  --    MCV 91 93 92   < >  --    PLT 95* 105* 106*   < >  --     139 138   < >  --    CO2 26 25 29   < >  --    BUN 18.4 16.4 18.9   < >  --    CR 1.65* 1.16* 1.25*   < >  --    PTT  --   --   --   --  47*    < > = values in this interval not displayed.       Last Comprehensive Metabolic Panel:  Lab Results   Component Value Date     04/04/2024    POTASSIUM 4.0 04/04/2024    CHLORIDE 100 04/04/2024    CO2 26 04/04/2024    ANIONGAP 12 04/04/2024     (H) 04/05/2024    BUN 18.4 04/04/2024    CR 1.65 (H) 04/04/2024    GFRESTIMATED 31 (L) 04/04/2024    SAUL 8.5 (L) 04/04/2024         Telemetry:  Afib, rate 30-40's    EKG 4/4/2024 - Afib, rate 44 bpm.  ms.    Echo 4/4/2024  Left ventricular systolic function is normal.The visual ejection fraction is 55-60%.  The right ventricular systolic function is normal.  The left atrium is moderately dilated.  There is mild (1+) mitral regurgitation.There is mild to moderate (1-2+)  tricuspid regurgitation.There is mild (1+) aortic regurgitation.  Dilation of the inferior vena cava is present with abnormal respiratory variation in diameter.     Compared to prior study dated 02/23/2024 no significant changes      Results for orders placed or performed during the hospital encounter of 04/04/24 (from the past 24 hour(s))   Glucose by meter   Result Value Ref Range    GLUCOSE BY METER POCT 93 70 - 99 mg/dL   Glucose by meter   Result Value Ref Range    GLUCOSE BY METER POCT 93 70 - 99 mg/dL   Glucose by meter   Result Value Ref Range    GLUCOSE BY METER POCT 106 (H) 70 - 99 mg/dL     *Note: Due to a large number of results and/or encounters for the requested time period, some results have not been displayed. A complete set of results can be found in Results Review.         Clinically Significant Risk  "Factors Present on Admission               # Drug Induced Coagulation Defect: home medication list includes an anticoagulant medication  # Thrombocytopenia: Lowest platelets = 95 in last 2 days, will monitor for bleeding  # Acute Kidney Injury, unspecified: based on a >150% or 0.3 mg/dL increase in last creatinine compared to past 90 day average, will monitor renal function   # Chronic heart failure with preserved ejection fraction: heart failure noted on problem list and last echo with EF >50%     # Obesity: Estimated body mass index is 37.41 kg/m  as calculated from the following:    Height as of 4/1/24: 1.727 m (5' 8\").    Weight as of an earlier encounter on 4/4/24: 111.6 kg (246 lb 0.5 oz).             Cardiac Arrhythmia: Atrial fibrillation: Longstanding and Persistent    Chronic Liver Disease: Other cirrhosis of liver    "

## 2024-04-05 NOTE — PLAN OF CARE
PPM placed today. Site C/D/I. A&O x4. Neuros intact x baseline neuropathy. VSS on RA. Up w/ Ax1. Tele: occasional Vpacing. Denied pain. Voiding in purewick. Had loose stools this morning. Tolerating low fiber diet. Alarms on. Plan for XR in AM and discharge tomorrow.

## 2024-04-05 NOTE — PHARMACY-ANTICOAGULATION SERVICE
Clinical Pharmacy - Warfarin Dosing Consult     Pharmacy has been consulted to manage this patient s warfarin therapy.  Indication: Atrial Fibrillation  Therapy Goal: INR 2-3  Warfarin Prior to Admission: Yes  Warfarin PTA Regimen: 2.5 mg Mon, 3.75 mg all other days  Recent documented change in oral intake/nutrition: Unknown  Dose Comments: 2 mg    INR   Date Value Ref Range Status   04/05/2024 3.01 (H) 0.85 - 1.15 Final   04/04/2024 2.55 (H) 0.85 - 1.15 Final     Recommend warfarin 2 mg today.  Pharmacy will monitor Savanna PRASANNA Sosakarie daily and order warfarin doses to achieve specified goal.      Please contact pharmacy as soon as possible if the warfarin needs to be held for a procedure or if the warfarin goals change.      Kenisha Goode, PharmD, BCPS

## 2024-04-05 NOTE — PROGRESS NOTES
[facility]    Medicine Progress Note - Hospitalist Service    Date of Admission:  4/4/2024  Date of Service: 04/05/2024    Assessment & Plan   Savanna Rehman is a 83 year-old female with past medical history including chronic atrial fibrillation, hypertension, HFpEF, DM2, CRISTIANA, FERREIRA who presents initially to [facility] with n/v and abdominal pain, admitted for diverticulitis. Subsequently with light-headedness and syncopal episode associated with atrial fibrillation with SVR and transferred to [facility] 4/4/2024 for consideration of pacemaker placement.     Atrial fibrillation with slow ventricular rate, symptomatic  *Noted to have brief syncopal and persistent light-headedness associated with atrial fibrillation with SVR  *Echocardiogram with EF 55-60%, no significant changes from prior   *TSH normal, not on AV shannon blocking agents  *Evaluated by cardiology who discussed with EP and felt appropriate for pacemaker  Plan:   - EP consulted -> PPM placement today  - Pacer pads in place  - Hold warfarin    Acute sigmoid diverticulitis with abscess  *Presented with left-lower quadrant pain, n/v, diarrhea  *CT abd/pelvis with acute sigmoid diverticulitis with possible adjacent 2.5 x 1.3 x 2.9 cm abscess  *Colorectal surgery following at [facility], not amenable to IR drainage   - Low fiber after PPM placement  - Colorectal surgery consulted -> non surgical  - Piperacillin-tazobactam IV continued  - Antiemetics PRN  - Acetaminophen PRN  - Monitor fever curve, WBC count    Chronic HFpEF  Hypertension   *Off fluids   - Resume fluids with NPO  - Daily weights      Acute kidney injury on chronic kidney disease, stage 3  Baseline creatinine 1.1-1.3. Creatinine increased to 1.65 4/4/24. Received contrast 4/1, possibly contributing. Has been receiving frequent ASA as part of [place], also had furosemide resumed 4/3.   Plan:  - Resume IVF  - Hold diuretics  - Avoid NSAIDs  - BMP in AM     "  Diabetes mellitus, type 2   HgbA1c 6.1% 2/8/2024. Prior to admission on glipizide XL 2.5 mg BID.  - Medium sliding scale insulin per NPO protocol  - Hold oral medications while hospitalized  - Hypoglycemia protocol     FERREIRA cirrhosis  Chronic Thrombocytopenia  LFTs wnl.  Plt 139.  CT revealed cirrhotic changes of the liver with associated secondary findings of portal venous hypertension which would include varicosities and splenomegaly. The portal veins are patent.   - Follow up with GI     Chronic diarrhea  Takes loperamide multiple times daily  - Resume loperamide when okay from CRS in setting of diverticulitis     Depression  - Continue prior to admission escitalopram     Obstructive sleep apnea  Non-compliant with CPAP  - Oxygen with sleep as needed     Right Buttocks Fluid Collection, suspected hematoma   Incidentally noted on CT imaging is a nonspecific fluid collection in the deep subcutaneous fat over the right buttocks region measuring 7.8 x 3.0 x 5.9 cm.  Likely due to hematoma as pt reports this has been present for months following a prior fall.  - Monitor          Diet: Low Fiber Diet    DVT Prophylaxis: Pneumatic Compression Devices  Aguilar Catheter: Not present  Lines: None     Cardiac Monitoring: ACTIVE order. Indication: Post- EP procedure (48 hours)  Code Status: Full Code      Clinically Significant Risk Factors Present on Admission               # Drug Induced Coagulation Defect: home medication list includes an anticoagulant medication  # Thrombocytopenia: Lowest platelets = 95 in last 2 days, will monitor for bleeding    # Chronic heart failure with preserved ejection fraction: heart failure noted on problem list and last echo with EF >50%     # Obesity: Estimated body mass index is 37.41 kg/m  as calculated from the following:    Height as of 4/1/24: 1.727 m (5' 8\").    Weight as of an earlier encounter on 4/4/24: 111.6 kg (246 lb 0.5 oz).        # Pacemaker present       Disposition Plan    "   Expected Discharge Date: 04/06/2024                    Darryl Byrnes MD  Hospitalist Service  [facility]  Securely message with [employer] (more info)  Text page via [employer] Paging/Directory   ______________________________________________________________________    Interval History     Doing well post PPM placement  Minimal pain  No CP/SOB  No fevers  No nausea / vomiting or worsening abdominal pain  No new complaints    Physical Exam   Vital Signs: Temp: 97.7  F (36.5  C) Temp src: Oral BP: (!) 152/88 Pulse: 61   Resp: 16 SpO2: 100 % O2 Device: None (Room air)    Weight: 0 lbs 0 oz    Constitutional: Well-appearing, NAD  Respiratory: Clear to auscultation bilaterally, good air movement, normal effort   Cardiovascular: Irregularly irregular with slow rate. Mild bilateral lower extremity edema   GI: Soft, tender to palpation in LLQ. No rebound tenderness or guarding.   Skin: Warm, dry   Neurologic: Alert. Responding to questions appropriately. Following commands.   Psychiatric: Normal affect, appropriate    ----------------------------------------------------------------------------------------    Medical Decision Making       50 MINUTES SPENT BY ME on the date of service doing chart review, history, exam, documentation & further activities per the note.      Data   ------------------------- PAST 24 HR DATA REVIEWED -----------------------------------------------    I have personally reviewed the following data over the past 24 hrs:    3.1 (L)  \   11.9   / 100 (L)     140 103 14.1 /  110 (H)   3.8 27 1.21 (H) \       Imaging results reviewed over the past 24 hrs:   Recent Results (from the past 24 hour(s))   EP Device    Narrative    Raul Plunkett MD     4/5/2024 12:19 PM  PROCEDURES PERFORMED:  - Implantation of single-chamber permanent pacemaker with left   bundle pacing  - Cardiac fluoroscopy  - Conscious sedation, mild-moderate level    INDICATIONS:  Symptomatic bradycardia, chronic  "persistent atrial fibrillation      PROCEDURE:  The benefits and risks of the procedure were discussed with the   patient in detail; the patient expressed understanding.  Informed   consent was obtained and placed in the chart.  I determined this   patient to be an appropriate candidate for the planned sedation   and procedure and have reassessed the patient immediately prior   to sedation and procedure. The patient was brought to the EP lab   in the fasting, non-sedated state.  We continuously monitored   EKG, vital signs, oxygenation and level of sedation.  I was   present during the entire time that conscious sedation was   provided.  Antibiotic prophylaxis was administered.  The chest   was prepped and draped in the usual sterile fashion.      Local anesthetic was administered.  Access to the axillary vein   was obtained with ultrasound guidance using the modified   Seldinger technique.  One wire was advanced to the IVC.  An   incision was made in the left pectoral area.  Dissection was   carried down to the myofascial plane and then continued caudally   to form the pocket.  Adequate hemostasis was obtained.      One 7.0 Fr sheath was introduced for the ventricular lead.  A His   sheath was advanced over a long wire into the mid right   ventricle.  The pacemaker lead was advanced through the sheath.    Pacing here showed an initial \"W\" pattern in lead V1.  The lead   was screwed in incrementally under fluoroscopy and checked at   each step.  The pacing morphology was not ideal so the lead was   retracted.  The sheath was repositioned to a slightly more   proximal location on the septum and the lead was advanced through   the sheath.  Pacing here again showed an initial \"W\" pattern.    The lead was screwed in incrementally and pacing parameters were   checked at each step.  LVAT was measured at 71 ms. The lead had   sensing, impedance, and threshold.  The sheathes were peeled away   and both leads were secured to " the pectoral muscle using Ethibond   sutures.    Normal saline was used to irrigate the pocket. The generator was   securely attached to the lead, placed in a Tyrx antimicrobial   pouch, and then placed in the pocket. The device was sutured in   the pocket with an Ethibond  suture. The pocket was closed in   layers.  The deep layers were closed using 2.0 and 3.0 Vicryl and   the subcuticular layer was closed with 4.0 Vicryl suture.   Dermabond was applied to the skin. The incision was covered with   a sterile dressing.    There was minimal blood loss (<30 mL) and no immediate   complications.  The patient tolerated the procedure well.    Implanted Hardware and Parameters:  Implant Name Type Inv. Item Serial No.  Lot No. [id number]   No. Used Action   LEAD PACEMAKER SELECTSECURE 69CM MD  3830-69 - [id number] Leads   LEAD PACEMAKER SELECTSECURE 69CM MD  3830-69 [id number]   [employer] [id number]  1 Implanted   PACEMAKER DOMINIC XT SR MRI SYS - [id number] Pacemaker PACEMAKER   DOMINIC XT SR MRI SYS [id number] [employer] [id number]  1   Implanted           CONCLUSIONS:  - Successful implantation of a single-chamber permanent pacemaker   with left bundle pacing  - Device check and chest X-ray tomorrow morning   - Okay to continue PO anticoagulation   - Routine follow up after discharge      Raul Plunkett MD       ------------------------- ENCOUNTER LABS ----------------------------------------------------------------  Recent Labs   Lab 04/05/24  0937 04/05/24  0804 04/05/24  0230 04/04/24  1325 04/04/24  1324 04/04/24  0814 04/04/24  0659 04/03/24  0915 04/03/24  0721 04/02/24  2038 04/02/24  1719 04/02/24  0849 04/02/24  0632 04/01/24  2139 04/01/24  1625   WBC 3.1*  --   --   --   --   --  3.6*  --  3.3*  --   --   --  4.2  --  4.5   HGB 11.9  --   --   --   --   --  12.1  --  11.6*  --   --   --  11.7  --  12.9   MCV 93  --   --   --   --   --  91  --  93  --   --   --  92  --  92   *  --   --   --    --   --  95*  --  105*  --   --   --  106*  --  139*   INR  --   --   --   --   --   --  2.55*  --  1.99*  --  2.04*  --   --   --  2.12*     --   --   --   --   --  138  --  139  --   --   --  138  --  138   POTASSIUM 3.8  --   --   --  4.0  --  4.1  --  4.0  --   --   --  4.0  --  3.8   CHLORIDE 103  --   --   --   --   --  100  --  101  --   --   --  98  --  97*   CO2 27  --   --   --   --   --  26  --  25  --   --   --  29  --  32*   BUN 14.1  --   --   --   --   --  18.4  --  16.4  --   --   --  18.9  --  20.6   CR 1.21*  --   --   --   --   --  1.65*  --  1.16*  --   --   --  1.25*  --  1.18*   ANIONGAP 10  --   --   --   --   --  12  --  13  --   --   --  11  --  9   SAUL 9.0  --   --   --   --   --  8.5*  --  8.6*  --   --   --  8.6*  --  9.2   * 106* 93   < >  --    < > 102*   < > 84   < >  --    < > 101*   < > 99   ALBUMIN  --   --   --   --   --   --   --   --   --   --   --   --  3.4*  --  3.9   PROTTOTAL  --   --   --   --   --   --   --   --   --   --   --   --  6.8  --  7.9   BILITOTAL  --   --   --   --   --   --   --   --   --   --   --   --  1.1  --  1.2   ALKPHOS  --   --   --   --   --   --   --   --   --   --   --   --  99  --  118   ALT  --   --   --   --   --   --   --   --   --   --   --   --  20  --  24   AST  --   --   --   --   --   --   --   --   --   --   --   --  41  --  44    < > = values in this interval not displayed.       Most Recent 3 CBC's:  Recent Labs   Lab Test 04/05/24 0937 04/04/24 0659 04/03/24 0721   WBC 3.1* 3.6* 3.3*   HGB 11.9 12.1 11.6*   MCV 93 91 93   * 95* 105*     Most Recent 3 BMP's:  Recent Labs   Lab Test 04/05/24  0937 04/05/24  0804 04/05/24  0230 04/04/24  1325 04/04/24  1324 04/04/24  0814 04/04/24  0659 04/03/24  0915 04/03/24  0721     --   --   --   --   --  138  --  139   POTASSIUM 3.8  --   --   --  4.0  --  4.1  --  4.0   CHLORIDE 103  --   --   --   --   --  100  --  101   CO2 27  --   --   --   --   --  26  --  25   BUN  14.1  --   --   --   --   --  18.4  --  16.4   CR 1.21*  --   --   --   --   --  1.65*  --  1.16*   ANIONGAP 10  --   --   --   --   --  12  --  13   SAUL 9.0  --   --   --   --   --  8.5*  --  8.6*   * 106* 93   < >  --    < > 102*   < > 84    < > = values in this interval not displayed.     Most Recent 2 LFT's:  Recent Labs   Lab Test 04/02/24  0632 04/01/24  1625   AST 41 44   ALT 20 24   ALKPHOS 99 118   BILITOTAL 1.1 1.2     Most Recent 3 INR's:  Recent Labs   Lab Test 04/04/24  0659 04/03/24  0721 04/02/24  1719   INR 2.55* 1.99* 2.04*     Most Recent 3 Troponin's:  Recent Labs   Lab Test 01/27/20  1025 01/04/18  2327 12/29/16 2010   TROPI <0.015 <0.015 0.774*     Most Recent 3 BNP's:  Recent Labs   Lab Test 02/22/24  1500 06/13/23  1441 01/27/20  1025   NTBNPI 1,177 1,539 957     Most Recent D-dimer:  Recent Labs   Lab Test 06/13/23  1441   DD 1.29*     Most Recent Cholesterol Panel:  Recent Labs   Lab Test 10/19/23  1035   CHOL 136   LDL 78   HDL 42*   TRIG 79     Most Recent 6 Bacteria Isolates From Any Culture (See EPIC Reports for Culture Details):  Recent Labs   Lab Test 07/08/21  0953 04/05/21  0819 03/16/21  1430 02/18/21  1111 01/28/21  1015 12/04/20  1850   CULT >100,000 colonies/mL  Escherichia coli  *  >100,000 colonies/mL  Strain 2  Escherichia coli  * >100,000 colonies/mL  Escherichia coli  * 10,000 to 50,000 colonies/mL  mixed urogenital thao  Susceptibility testing not routinely done   <10,000 colonies/mL  mixed urogenital thao  Susceptibility testing not routinely done   >100,000 colonies/mL  Enterobacter cloacae complex  * >100,000 colonies/mL  Citrobacter freundii complex  *     Most Recent TSH and T4:  Recent Labs   Lab Test 04/04/24  1324   TSH 2.98     Most Recent Hemoglobin A1c:  Recent Labs   Lab Test 02/08/24  1220   A1C 6.1*     Most Recent Urinalysis:  Recent Labs   Lab Test 04/01/24  2030 06/10/23  1951 02/07/23  1000   COLOR Light Yellow   < > Yellow   APPEARANCE Clear    < > Slightly Cloudy*   URINEGLC Negative   < > Negative   URINEBILI Negative   < > Negative   URINEKETONE Negative   < > Negative   SG 1.005   < > 1.025   UBLD Negative   < > Moderate*   URINEPH 8.0*   < > 5.5   PROTEIN Negative   < > Negative   UROBILINOGEN  --   --  0.2   NITRITE Negative   < > Positive*   LEUKEST Negative   < > Large*   RBCU <1   < > 2-5*   WBCU <1   < > >100*    < > = values in this interval not displayed.     Most Recent ESR & CRP:No lab results found.

## 2024-04-06 ENCOUNTER — APPOINTMENT (OUTPATIENT)
Dept: GENERAL RADIOLOGY | Facility: CLINIC | Age: 84
DRG: 243 | End: 2024-04-06
Attending: INTERNAL MEDICINE
Payer: COMMERCIAL

## 2024-04-06 ENCOUNTER — APPOINTMENT (OUTPATIENT)
Dept: PHYSICAL THERAPY | Facility: CLINIC | Age: 84
DRG: 243 | End: 2024-04-06
Attending: STUDENT IN AN ORGANIZED HEALTH CARE EDUCATION/TRAINING PROGRAM
Payer: COMMERCIAL

## 2024-04-06 ENCOUNTER — TRANSFERRED RECORDS (OUTPATIENT)
Dept: HEALTH INFORMATION MANAGEMENT | Facility: CLINIC | Age: 84
End: 2024-04-06
Payer: COMMERCIAL

## 2024-04-06 LAB
GLUCOSE BLDC GLUCOMTR-MCNC: 105 MG/DL (ref 70–99)
GLUCOSE BLDC GLUCOMTR-MCNC: 113 MG/DL (ref 70–99)
GLUCOSE BLDC GLUCOMTR-MCNC: 116 MG/DL (ref 70–99)
GLUCOSE BLDC GLUCOMTR-MCNC: 117 MG/DL (ref 70–99)
GLUCOSE BLDC GLUCOMTR-MCNC: 84 MG/DL (ref 70–99)
INR PPP: 2.91 (ref 0.85–1.15)

## 2024-04-06 PROCEDURE — 99024 POSTOP FOLLOW-UP VISIT: CPT | Performed by: INTERNAL MEDICINE

## 2024-04-06 PROCEDURE — 250N000011 HC RX IP 250 OP 636: Performed by: INTERNAL MEDICINE

## 2024-04-06 PROCEDURE — 210N000002 HC R&B HEART CARE

## 2024-04-06 PROCEDURE — 97530 THERAPEUTIC ACTIVITIES: CPT | Mod: GP

## 2024-04-06 PROCEDURE — 250N000013 HC RX MED GY IP 250 OP 250 PS 637: Performed by: INTERNAL MEDICINE

## 2024-04-06 PROCEDURE — 97161 PT EVAL LOW COMPLEX 20 MIN: CPT | Mod: GP

## 2024-04-06 PROCEDURE — 250N000013 HC RX MED GY IP 250 OP 250 PS 637: Performed by: STUDENT IN AN ORGANIZED HEALTH CARE EDUCATION/TRAINING PROGRAM

## 2024-04-06 PROCEDURE — 999N000065 XR CHEST 2 VIEWS

## 2024-04-06 PROCEDURE — 99232 SBSQ HOSP IP/OBS MODERATE 35: CPT | Performed by: STUDENT IN AN ORGANIZED HEALTH CARE EDUCATION/TRAINING PROGRAM

## 2024-04-06 PROCEDURE — 36415 COLL VENOUS BLD VENIPUNCTURE: CPT | Performed by: STUDENT IN AN ORGANIZED HEALTH CARE EDUCATION/TRAINING PROGRAM

## 2024-04-06 PROCEDURE — 85610 PROTHROMBIN TIME: CPT | Performed by: STUDENT IN AN ORGANIZED HEALTH CARE EDUCATION/TRAINING PROGRAM

## 2024-04-06 RX ORDER — ACETAMINOPHEN 500 MG
1000 TABLET ORAL EVERY 6 HOURS PRN
Status: DISCONTINUED | OUTPATIENT
Start: 2024-04-06 | End: 2024-04-08 | Stop reason: HOSPADM

## 2024-04-06 RX ORDER — WARFARIN SODIUM 2 MG/1
2 TABLET ORAL
Status: COMPLETED | OUTPATIENT
Start: 2024-04-06 | End: 2024-04-06

## 2024-04-06 RX ORDER — FUROSEMIDE 20 MG
20 TABLET ORAL DAILY
Status: DISCONTINUED | OUTPATIENT
Start: 2024-04-06 | End: 2024-04-08 | Stop reason: HOSPADM

## 2024-04-06 RX ADMIN — ACETAMINOPHEN 1000 MG: 500 TABLET, FILM COATED ORAL at 21:02

## 2024-04-06 RX ADMIN — ESCITALOPRAM OXALATE 5 MG: 5 TABLET, FILM COATED ORAL at 21:02

## 2024-04-06 RX ADMIN — MELATONIN 5 MG TABLET 5 MG: at 22:41

## 2024-04-06 RX ADMIN — FUROSEMIDE 20 MG: 20 TABLET ORAL at 15:16

## 2024-04-06 RX ADMIN — ACETAMINOPHEN 1000 MG: 500 TABLET, FILM COATED ORAL at 09:59

## 2024-04-06 RX ADMIN — WARFARIN SODIUM 2 MG: 2 TABLET ORAL at 17:52

## 2024-04-06 RX ADMIN — PIPERACILLIN AND TAZOBACTAM 2.25 G: 2; .25 INJECTION, POWDER, FOR SOLUTION INTRAVENOUS at 14:14

## 2024-04-06 RX ADMIN — Medication 1 MG: at 21:02

## 2024-04-06 RX ADMIN — PIPERACILLIN AND TAZOBACTAM 2.25 G: 2; .25 INJECTION, POWDER, FOR SOLUTION INTRAVENOUS at 01:17

## 2024-04-06 RX ADMIN — FAMOTIDINE 20 MG: 20 TABLET ORAL at 08:37

## 2024-04-06 RX ADMIN — PIPERACILLIN AND TAZOBACTAM 2.25 G: 2; .25 INJECTION, POWDER, FOR SOLUTION INTRAVENOUS at 08:37

## 2024-04-06 RX ADMIN — PIPERACILLIN AND TAZOBACTAM 2.25 G: 2; .25 INJECTION, POWDER, FOR SOLUTION INTRAVENOUS at 19:48

## 2024-04-06 ASSESSMENT — ACTIVITIES OF DAILY LIVING (ADL)
ADLS_ACUITY_SCORE: 34
ADLS_ACUITY_SCORE: 36
ADLS_ACUITY_SCORE: 45
ADLS_ACUITY_SCORE: 34
ADLS_ACUITY_SCORE: 45
ADLS_ACUITY_SCORE: 46
ADLS_ACUITY_SCORE: 46
ADLS_ACUITY_SCORE: 36
ADLS_ACUITY_SCORE: 45
ADLS_ACUITY_SCORE: 46
ADLS_ACUITY_SCORE: 34
ADLS_ACUITY_SCORE: 46
ADLS_ACUITY_SCORE: 45
ADLS_ACUITY_SCORE: 49
ADLS_ACUITY_SCORE: 36
ADLS_ACUITY_SCORE: 45
ADLS_ACUITY_SCORE: 45
ADLS_ACUITY_SCORE: 46
ADLS_ACUITY_SCORE: 46
ADLS_ACUITY_SCORE: 45
ADLS_ACUITY_SCORE: 46
ADLS_ACUITY_SCORE: 45
ADLS_ACUITY_SCORE: 49

## 2024-04-06 NOTE — PLAN OF CARE
Goal Outcome Evaluation:           Overall Patient Progress: improvingOverall Patient Progress: improving    Outcome Evaluation: POD1 after pacemaker placement. Patient concerned that she is too weak to take care of herself at home if discharged today since she lives home alone. She also required supplemental oxygen while asleep for sats of 80s on room air when asleep. All other VSS    Orientation: AOx4    Vitals/Tele: Afib CVR. O2 2 L while asleep, room air when awake. VSS    IV Access/drains: PIV SL    Diet: Low Fibre diet    Mobility: Walker with gait belt    GI/: Bedside commode, Pure-wick for urine    Wound/Skin: Ecchymosis of the chest and procedure site. No changes over the shift    Consults: Cardiology    Discharge Plan: Possible today, patient states not ready       See Flow sheets for assessment   Can Taveras RN on 4/6/2024 at 6:49 AM

## 2024-04-06 NOTE — PLAN OF CARE
Goal Outcome Evaluation:      Plan of Care Reviewed With: patient        Patient alert, A1-2 with walker and gait belt. Shaking, not moving feet well, unable to go more than a few steps. PT consult as pt unable to go home due to safety. PT rec TCU. SW working on placement. Left chest pacemaker site has ecchymotic areas. Dressing dry intact, occ pacing  Afib, occ appears to be in SR. Zosyn . 2 loose stool today, No abd pain,N/V. Discharge when TCU bed available.

## 2024-04-06 NOTE — CONSULTS
Care Management Initial Consult    General Information  Assessment completed with: Patient, Patient  Type of CM/SW Visit: Initial Assessment    Primary Care Provider verified and updated as needed:     Readmission within the last 30 days:        Reason for Consult: discharge planning  Advance Care Planning: Advance Care Planning Reviewed:  (She has a POLST)          Communication Assessment  Patient's communication style: spoken language (English or Bilingual)             Cognitive  Cognitive/Neuro/Behavioral: WDL        Orientation: oriented x 4             Living Environment:   People in home: alone     Current living Arrangements: independent living facility ([facility])      Able to return to prior arrangements: yes       Family/Social Support:  Care provided by: self  Provides care for: no one  Marital Status:   Children          Description of Support System: Supportive, Involved    Support Assessment: Adequate family and caregiver support, Adequate social supports    Current Resources:   Patient receiving home care services: Yes  Skilled Home Care Services: Physical Therapy ([facility])  Community Resources: Home Care  Equipment currently used at home: walker, rolling, other (see comments) (Electric Scooter)  Supplies currently used at home: None    Employment/Financial:  Employment Status: retired        Financial Concerns:             Does the patient's insurance plan have a 3 day qualifying hospital stay waiver?  Yes     Which insurance plan 3 day waiver is available? Alternative insurance waiver    Will the waiver be used for post-acute placement? No    Lifestyle & Psychosocial Needs:  Social Determinants of Health     Food Insecurity: Low Risk  (2/1/2024)    Food Insecurity     Within the past 12 months, did you worry that your food would run out before you got money to buy more?: No     Within the past 12 months, did the food you bought just not last and you didn t  have money to get more?: No   Depression: Not at risk (2/28/2024)    PHQ-2     PHQ-2 Score: 0   Recent Concern: Depression - At risk (1/12/2024)    PHQ-2     PHQ-2 Score: 3   Housing Stability: Low Risk  (2/1/2024)    Housing Stability     Do you have housing? : Yes     Are you worried about losing your housing?: No   Tobacco Use: Medium Risk (3/22/2024)    Patient History     Smoking Tobacco Use: Former     Smokeless Tobacco Use: Never     Passive Exposure: Past   Financial Resource Strain: Low Risk  (2/1/2024)    Financial Resource Strain     Within the past 12 months, have you or your family members you live with been unable to get utilities (heat, electricity) when it was really needed?: No   Alcohol Use: Not At Risk (1/27/2022)    AUDIT-C     Frequency of Alcohol Consumption: Monthly or less     Average Number of Drinks: 1 or 2     Frequency of Binge Drinking: Never   Transportation Needs: High Risk (2/1/2024)    Transportation Needs     Within the past 12 months, has lack of transportation kept you from medical appointments, getting your medicines, non-medical meetings or appointments, work, or from getting things that you need?: Yes   Physical Activity: Not on file   Interpersonal Safety: Low Risk  (9/21/2023)    Interpersonal Safety     Do you feel physically and emotionally safe where you currently live?: Yes     Within the past 12 months, have you been hit, slapped, kicked or otherwise physically hurt by someone?: No     Within the past 12 months, have you been humiliated or emotionally abused in other ways by your partner or ex-partner?: No   Stress: Stress Concern Present (1/27/2022)    [employer] - Occupational Stress Questionnaire     Feeling of Stress : Very much   Social Connections: Not on file       Functional Status:  Prior to admission patient needed assistance:              Mental Health Status:          Chemical Dependency Status:                 Values/Beliefs:  Spiritual, Cultural Beliefs, Mandaeism Practices, Values that affect care: no               Additional Information:  Per care management/social work consult for discharge planning.  Patient was admitted on 4-6-24 for a possible pacemake placement.  The tentative date of discharge is 4-8-24.  Patient was transferred from [facility].  Reviewed chart and spoke with patient regarding discharge plans.  Per patient report, she lives alone in a senior apartment at [facility].  Patient uses a rolling walker at baseline within her apartment and an electric scooter for longer distances.  Patient has grab bars in the bathroom.  Patient has a shower with a seat in it.  Patient is independent with ADL's.  Patient is open to [facility] for PT.  Reviewed the therapy discharge recommendations of TCU placement on discharge and patient is in agreement.  Patient states sh has a strong preference to go to [facility] on discharge.  Explained to patient that we may need other choices.  Patient states she will talk to her family about possible additional choices.  We discussed [place], [facility], [place], and [facility].  Patient is asking that we follow up with her on Sunday to get her additional choices.  Referral sent, via the discharge navigator, to check bed availability at [facility].  Reviewed the $36 per day amenity and patient is in agreement.      Will continue to follow.      ANA LUISA Rodriguez, [facility]    [phone]  [facility]

## 2024-04-06 NOTE — PROGRESS NOTES
04/06/24 1256   Appointment Info   Signing Clinician's Name / Credentials (PT) Gretel Chaney, PT, DPT   Living Environment   People in Home alone   Current Living Arrangements apartment   Home Accessibility no concerns   Transportation Anticipated family or friend will provide   Living Environment Comments Pt lives in senior living apartment, has grab bars in bathroom, walk in shower with a seat.   Self-Care   Usual Activity Tolerance moderate   Current Activity Tolerance poor   Regular Exercise No   Equipment Currently Used at Home walker, rolling;wheelchair, power   Fall history within last six months yes   Number of times patient has fallen within last six months 1   Activity/Exercise/Self-Care Comment Pt is independent with ADLs, has a home care nurse, has supportive family, ambulates with 4WW around apartment, has electric scooter for longer distances   General Information   Onset of Illness/Injury or Date of Surgery 04/04/24   Referring Physician Darryl Byrnes MD   Patient/Family Therapy Goals Statement (PT) To get stronger   Pertinent History of Current Problem (include personal factors and/or comorbidities that impact the POC) Pt is 83 year old female adm on 4/4/2024 as a transfer from [facility]. Pt presented to [facility] on 4/1/24 with bladder pain x9 days and diarrhea x4 days, B LE swelling. CT concern for diverticulitis and question of abscess, seen by colorectal surgery with no surgical plans at this time. Pt had syncopal episode, dizziness, HR in 40's therefore transferred to [facility] for EP consult and had PPM placed 4/5/24. PMH includes chronic a-fib on warafin, HTN, chronic diastolic CHF, CKD, DM 2, FERREIRA, chronic thrombocytopenia, depression, CRISTIANA   Existing Precautions/Restrictions fall;pacemaker   Cognition   Affect/Mental Status (Cognition) WFL   Orientation Status (Cognition) oriented x 3   Follows Commands (Cognition) WFL   Cognitive Status Comments Pt does not recall PPM precautions, needs cues  throughout   Pain Assessment   Patient Currently in Pain No   Posture    Posture Forward head position;Protracted shoulders   Range of Motion (ROM)   Range of Motion ROM is WFL   Strength (Manual Muscle Testing)   Strength (Manual Muscle Testing) Deficits observed during functional mobility   Strength Comments Overall deconditioning, no focal weakness   Bed Mobility   Comment, (Bed Mobility) Mod I   Transfers   Comment, (Transfers) CGA   Gait/Stairs (Locomotion)   Comment, (Gait/Stairs) Min assist with 4WW   Balance   Balance Comments Pt overall unsteady with standing/ambulation, tremulous   Clinical Impression   Criteria for Skilled Therapeutic Intervention Yes, treatment indicated   PT Diagnosis (PT) Impaired mobility   Influenced by the following impairments Decreased strength, decreased balance, decreased activity tolerance   Functional limitations due to impairments Decreased ability to participate in daily tasks   Clinical Presentation (PT Evaluation Complexity) evolving   Clinical Presentation Rationale Current presentation, [facility]   Clinical Decision Making (Complexity) low complexity   Planned Therapy Interventions (PT) balance training;bed mobility training;gait training;home exercise program;patient/family education;strengthening;transfer training   Risk & Benefits of therapy have been explained evaluation/treatment results reviewed;care plan/treatment goals reviewed;risks/benefits reviewed;current/potential barriers reviewed;participants voiced agreement with care plan;participants included;patient   PT Total Evaluation Time   PT Eval, Low Complexity Minutes (80591) 10   Physical Therapy Goals   PT Frequency Daily   PT Predicted Duration/Target Date for Goal Attainment 04/13/24   PT Goals Bed Mobility;Transfers;Gait;PT Goal 1   PT: Bed Mobility Modified independent;Supine to/from sit;Rolling;Within precautions   PT: Transfers Modified independent;Sit to/from stand;Bed to/from chair;Assistive device   PT:  Gait Modified independent;Rolling walker;100 feet   PT: Goal 1 Pt will verbalize and demonstrade adherence to PPM precautions.   Interventions   Interventions Quick Adds Gait Training;Therapeutic Activity;Therapeutic Procedure   Therapeutic Activity   Therapeutic Activities: dynamic activities to improve functional performance Minutes (28757) 30   Symptoms Noted During/After Treatment Fatigue;Dizziness   Treatment Detail/Skilled Intervention Pt had just returned to bed with nursing on arrival, time spent building rapport and on education of role of PT in acute care as well as importance of mobility. Also provided education on PPM precautions, pt expressing concern over how she will be able to do daily activities while remembering PPM precautions. Soft sling may be helpful for pt if she has difficulty remembering PPM precautions. Pt not wanting assist with mobility tasks, able to complete supine to sit mod I, needing significantly increased time and also using UEs heavily. Pt does c/o feeling lightheaded with mobility. Pt sit to stand with 4WW and CGA. Pt able to ambulate 5' with min assist with 4WW, is noticeably shakey in UEs and overall unsteady, poor tolerance to standing activities, at increased risk for falls if attempting to mobilize without assist. Time spent discussing discharge planning with pt and visitors at bedside. Pt lives alone but is not currently safe to mobilize independently, recommend TCU. Pt's family also in agreement TCU would be beneficial. Pt has been to TCU in the past and had one good experience and one bad experience, is agreeable to TCU, first choice being [facility].   PT Discharge Planning   PT Plan General strengthening and mobility training, monitor BP and HR   PT Discharge Recommendation (DC Rec) Transitional Care Facility   PT Rationale for DC Rec Pt is below baseline level of function, is at increased risk for falls, would benefit from continued inpatient rehab to further  address deficits and optimize functional recovery.   PT Brief overview of current status CGA to min assist with 4WW, decreased tolerance   Total Session Time   Timed Code Treatment Minutes 30   Total Session Time (sum of timed and untimed services) 40

## 2024-04-06 NOTE — PROGRESS NOTES
EP Progress Note          Assessment and Plan:     83 year old female with type 2 diabetes, hypertension, CRISTIANA, HFpEF, chronic persistent atrial fibrillation who was admitted on 4/4/2024 with nausea/vomiting and abdominal pain, found to have diverticulitis and abscess.  She has been treated with IV antibiotics.  She was transferred to [facility] to  symptomatic bradycardia and pauses.  She has persistent atrial fibrillation with rates currently 30-40's on telemetry.  She had pauses up to 3.2 seconds while at [facility].  She did have a suspicious episode of syncope the night prior but was unfortunately not on telemetry at the time.  Echocardiogram shows preserved LV function.      She underwent single chamber pacemaker implant yesterday with left bundle pacing.  Device check this morning shows good sensing, impedance, and threshold.  CXR shows stable lead position, no pneumothorax.      - Recommended tylenol and ice for incision pain.  - Continue warfarin , please avoid any IV/subcutaneous anticoagulation  - Follow up in device clinic for wound and device check in 1-2 weeks  - EP will sign off, please call with any additional questions      Raul Plunkett MD              Interval History:     No events overnight.  She does have significant tenderness over the incision site.  Skin spots/rash from tape, mild redness noted.   She does still feel very weak and has not been able to get up and walk.  Concerned about going home since she lives alone.            Medications:       Current Facility-Administered Medications   Medication Dose Route Frequency Provider Last Rate Last Admin    acetaminophen (TYLENOL) tablet 1,000 mg  1,000 mg Oral Q6H PRN Darryl Byrnes MD   1,000 mg at 04/06/24 0959    benzocaine-menthol (CHLORASEPTIC) 6-10 MG lozenge 1 lozenge  1 lozenge Buccal Q1H PRN Rashid Scanlon MD        glucose gel 15-30 g  15-30 g Oral Q15 Min PRN Rashid Scanlon MD        Or    dextrose 50 % injection 25-50 mL   25-50 mL Intravenous Q15 Min PRN Rashid Scanlon MD        Or    glucagon injection 1 mg  1 mg Subcutaneous Q15 Min PRN Rashid Scanlon MD        escitalopram (LEXAPRO) tablet 5 mg  5 mg Oral At Bedtime Rashid Scanlon MD   5 mg at 04/05/24 2119    famotidine (PEPCID) tablet 20 mg  20 mg Oral Daily Rashid Scanlon MD   20 mg at 04/06/24 0837    furosemide (LASIX) tablet 20 mg  20 mg Oral Daily Darryl Byrnes MD   20 mg at 04/06/24 1516    HOLD: enoxaparin (LOVENOX) Post Procedure   Does not apply HOLD Raul Plunkett MD        HOLD: heparin (IV or Subcutaneous) Post Procedure   Does not apply HOLD Raul Plunkett MD        HOLD: metformin and metformin containing medications on day of procedure and 48 hours after IV contrast given   Does not apply HOLD Raul Plunkett MD        hydrALAZINE (APRESOLINE) tablet 10 mg  10 mg Oral Q4H PRN Rashid Scanlon MD        Or    hydrALAZINE (APRESOLINE) injection 10 mg  10 mg Intravenous Q4H PRN Rashid Scanlon MD        insulin aspart (NovoLOG) injection (RAPID ACTING)  1-7 Units Subcutaneous Q4H Rashid Scanlon MD   1 Units at 04/05/24 2120    melatonin tablet 1 mg  1 mg Oral At Bedtime PRN Rashid Scanlon MD   1 mg at 04/05/24 0005    ondansetron (ZOFRAN ODT) ODT tab 4 mg  4 mg Oral Q6H PRN Rashid Scanlon MD        Or    ondansetron (ZOFRAN) injection 4 mg  4 mg Intravenous Q6H PRN Rashid Scanlon MD        Patient is already receiving anticoagulation with heparin, enoxaparin (LOVENOX), warfarin (COUMADIN)  or other anticoagulant medication   Does not apply Continuous PRN Rashid Scanlon MD        piperacillin-tazobactam (ZOSYN) 2.25 g vial to attach to  ml bag  2.25 g Intravenous Q6H Rashid Scanlon  mL/hr at 04/04/24 2345 2.25 g at 04/06/24 1414    prochlorperazine (COMPAZINE) injection 5 mg  5 mg Intravenous Q6H PRN Rashid Scanlon MD        Or    prochlorperazine (COMPAZINE)  tablet 5 mg  5 mg Oral Q6H PRN Rashid Scanlon MD        Or    prochlorperazine (COMPAZINE) suppository 12.5 mg  12.5 mg Rectal Q12H PRN Rashid Scanlon MD        sodium chloride (PF) 0.9% PF flush 3 mL  3 mL Intracatheter Q8H Rashid Scanlon MD   3 mL at 24 1414    sodium chloride (PF) 0.9% PF flush 3 mL  3 mL Intracatheter q1 min prn Rashid Scanlon MD        warfarin ANTICOAGULANT (COUMADIN) tablet 2 mg  2 mg Oral ONCE at 18:00 Rashid Scanlon MD        Warfarin Dose Required Daily - Pharmacist Managed  1 each Oral See Admin Instructions Darryl Byrnes MD         Facility-Administered Medications Ordered in Other Encounters   Medication Dose Route Frequency Provider Last Rate Last Admin    nitroglycerin 100 MCG/ML injection                      Review of Systems:   The Review of Systems is negative other than noted in the HPI             Physical Exam:   BP (!) 154/71 (BP Location: Right arm)   Pulse 60   Temp 97.7  F (36.5  C) (Oral)   Resp 17   LMP  (LMP Unknown)   SpO2 96%       Vital Sign Ranges  Temperature Temp  Av.8  F (36.6  C)  Min: 97.7  F (36.5  C)  Max: 98.1  F (36.7  C)   Blood pressure Systolic (24hrs), Av , Min:124 , Max:160        Diastolic (24hrs), Av, Min:71, Max:88      Pulse Pulse  Av.7  Min: 59  Max: 62   Respirations Resp  Av.4  Min: 14  Max: 18   Pulse oximetry SpO2  Av.1 %  Min: 86 %  Max: 100 %         Intake/Output Summary (Last 24 hours) at 2024 0907  Last data filed at 2024 0533  Gross per 24 hour   Intake 400 ml   Output 1200 ml   Net -800 ml         Constitutional: Sitting in chair, no apparent distress.  Respiratory: Breathing non-labored.   Cardiovascular:  Regular rate and rhythm. Left upper chest pacemaker in place, mild redness seen under the dress, no drainage or bleeding  Skin: Warm, dry.   Neurologic:  Alert, awake, and oriented to person, place and time.  Psychiatric: Affect appropriate.                Data:     Telemetry: V paced, rate 60's    EKG 4/5/2024-V paced rate 61 bpm, underlying A-fib.    Last Comprehensive Metabolic Panel:  Lab Results   Component Value Date     04/05/2024    POTASSIUM 3.8 04/05/2024    CHLORIDE 103 04/05/2024    CO2 27 04/05/2024    ANIONGAP 10 04/05/2024     (H) 04/06/2024    BUN 14.1 04/05/2024    CR 1.21 (H) 04/05/2024    GFRESTIMATED 45 (L) 04/05/2024    SAUL 9.0 04/05/2024       CBC RESULTS:   Recent Labs   Lab Test 04/05/24  0937   WBC 3.1*   RBC 3.85   HGB 11.9   HCT 35.8   MCV 93   MCH 30.9   MCHC 33.2   RDW 14.1   *

## 2024-04-06 NOTE — PROGRESS NOTES
[facility]    Medicine Progress Note - Hospitalist Service    Date of Admission:  4/4/2024  Date of Service: 04/06/2024    Assessment & Plan   Savanna Rehman is a 83 year-old female with past medical history including chronic atrial fibrillation, hypertension, HFpEF, DM2, CRISTIANA, FERREIRA who presents initially to [facility] with n/v and abdominal pain, admitted for diverticulitis. Subsequently with light-headedness and syncopal episode associated with atrial fibrillation with SVR and transferred to [facility] 4/4/2024 for consideration of pacemaker placement.     Atrial fibrillation with slow ventricular rate, symptomatic  *Noted to have brief syncopal and persistent light-headedness associated with atrial fibrillation with SVR  *Echocardiogram with EF 55-60%, no significant changes from prior   *TSH normal, not on AV shannon blocking agents  *Evaluated by cardiology who discussed with EP and felt appropriate for pacemaker  Plan:   - EP consulted -> PPM placement 04/05  - Pacer pads in place  - Resume warfarin  - Daily INR    Acute sigmoid diverticulitis with abscess  *Presented with left-lower quadrant pain, n/v, diarrhea  *CT abd/pelvis with acute sigmoid diverticulitis with possible adjacent 2.5 x 1.3 x 2.9 cm abscess  *Colorectal surgery following at [facility], not amenable to IR drainage   - Low fiber after PPM placement  - Colorectal surgery consulted -> non surgical  - Piperacillin-tazobactam IV continued -> Augmentin at discharge  - Antiemetics PRN  - Acetaminophen PRN  - Monitor fever curve, WBC count    Chronic HFpEF  Hypertension   *Off fluids   - Resume fluids with NPO  - Daily weights      Acute kidney injury on chronic kidney disease, stage 3  Baseline creatinine 1.1-1.3. Creatinine increased to 1.65 4/4/24. Received contrast 4/1, possibly contributing. Has been receiving frequent ASA as part of Fior[place], also had furosemide resumed 4/3.   Plan:  - Stop  IVF  - Resume PTA  "diuretics  - Avoid NSAIDs  - BMP in AM      Diabetes mellitus, type 2   HgbA1c 6.1% 2/8/2024. Prior to admission on glipizide XL 2.5 mg BID.  - Medium sliding scale insulin per NPO protocol  - Hold oral medications while hospitalized  - Hypoglycemia protocol     FERREIRA cirrhosis  Chronic Thrombocytopenia  LFTs wnl.  Plt 139.  CT revealed cirrhotic changes of the liver with associated secondary findings of portal venous hypertension which would include varicosities and splenomegaly. The portal veins are patent.   - Follow up with GI     Chronic diarrhea  Takes loperamide multiple times daily  - Resume loperamide when okay from CRS in setting of diverticulitis     Depression  - Continue prior to admission escitalopram     Obstructive sleep apnea  Non-compliant with CPAP  - Oxygen with sleep as needed     Right Buttocks Fluid Collection, suspected hematoma   Incidentally noted on CT imaging is a nonspecific fluid collection in the deep subcutaneous fat over the right buttocks region measuring 7.8 x 3.0 x 5.9 cm.  Likely due to hematoma as pt reports this has been present for months following a prior fall.  - Monitor          Diet: Low Fiber Diet  Room Service    DVT Prophylaxis: Pneumatic Compression Devices  Aguilar Catheter: Not present  Lines: None     Cardiac Monitoring: ACTIVE order. Indication: Post- EP procedure (48 hours)  Code Status: Full Code      Clinically Significant Risk Factors                # Thrombocytopenia: Lowest platelets = 100 in last 2 days, will monitor for bleeding    # Chronic heart failure with preserved ejection fraction: heart failure noted on problem list and last echo with EF >50%       # Obesity: Estimated body mass index is 37.95 kg/m  as calculated from the following:    Height as of 4/1/24: 1.727 m (5' 8\").    Weight as of this encounter: 113.2 kg (249 lb 9.6 oz)., PRESENT ON ADMISSION      # Pacemaker present       Disposition Plan      Expected Discharge Date: 04/06/2024              "       Darryl Byrnes MD  Hospitalist Service  [facility]  Securely message with [employer] (more info)  Text page via [employer] Paging/Directory   ______________________________________________________________________    Interval History     No acute events overnight  Main complaint is generalized weakness  Otherwise doing well post PPM placement  No CP/SOB  No fevers  No nausea / vomiting or worsening abdominal pain  No new complaints    Physical Exam   Vital Signs: Temp: 97.7  F (36.5  C) Temp src: Oral BP: (!) 154/71 Pulse: 60   Resp: 17 SpO2: 96 % O2 Device: None (Room air) Oxygen Delivery: 2 LPM  Weight: 249 lbs 9.6 oz    Constitutional: Well-appearing, NAD  Respiratory: Clear to auscultation bilaterally, good air movement, normal effort   Cardiovascular: Irregularly irregular with slow rate. Mild bilateral lower extremity edema   GI: Soft, mild tenderness to palpation in LLQ. No rebound tenderness or guarding.   Skin: Warm, dry   Neurologic: Alert/Oriented x 3. Responding to questions appropriately. Following commands. Benigno.  Psychiatric: Normal affect, appropriate    ----------------------------------------------------------------------------------------    Medical Decision Making       35 MINUTES SPENT BY ME on the date of service doing chart review, history, exam, documentation & further activities per the note.      Data   ------------------------- PAST 24 HR DATA REVIEWED -----------------------------------------------    I have personally reviewed the following data over the past 24 hrs:    INR:  2.91 (H) PTT:  N/A   D-dimer:  N/A Fibrinogen:  N/A       Imaging results reviewed over the past 24 hrs:   Recent Results (from the past 24 hour(s))   X-ray Chest 2 vws*    Narrative    EXAM: XR CHEST 2 VIEWS  LOCATION: [facility]  DATE: 4/6/2024    INDICATION: Status post pacer ICD  COMPARISON: 02/22/2024      Impression    IMPRESSION:     New left subclavian approach  single pacer with lead tip near the right ventricle.    Mild basilar atelectasis. Small pleural effusions are suspected on the lateral view. Stable cardiomediastinal silhouette size. No pneumothorax.       ------------------------- ENCOUNTER LABS ----------------------------------------------------------------  Recent Labs   Lab 04/06/24  0803 04/06/24  0553 04/06/24  0529 04/06/24  0100 04/05/24  1835 04/05/24  1533 04/05/24  0937 04/04/24  1325 04/04/24  1324 04/04/24  0814 04/04/24  0659 04/03/24  0915 04/03/24  0721 04/02/24  0849 04/02/24  0632 04/01/24  2139 04/01/24  1625   WBC  --   --   --   --   --   --  3.1*  --   --   --  3.6*  --  3.3*  --  4.2  --  4.5   HGB  --   --   --   --   --   --  11.9  --   --   --  12.1  --  11.6*  --  11.7  --  12.9   MCV  --   --   --   --   --   --  93  --   --   --  91  --  93  --  92  --  92   PLT  --   --   --   --   --   --  100*  --   --   --  95*  --  105*  --  106*  --  139*   INR  --  2.91*  --   --   --  3.01*  --   --   --   --  2.55*  --  1.99*   < >  --   --  2.12*   NA  --   --   --   --   --   --  140  --   --   --  138  --  139  --  138  --  138   POTASSIUM  --   --   --   --   --   --  3.8  --  4.0  --  4.1  --  4.0  --  4.0  --  3.8   CHLORIDE  --   --   --   --   --   --  103  --   --   --  100  --  101  --  98  --  97*   CO2  --   --   --   --   --   --  27  --   --   --  26  --  25  --  29  --  32*   BUN  --   --   --   --   --   --  14.1  --   --   --  18.4  --  16.4  --  18.9  --  20.6   CR  --   --   --   --   --   --  1.21*  --   --   --  1.65*  --  1.16*  --  1.25*  --  1.18*   ANIONGAP  --   --   --   --   --   --  10  --   --   --  12  --  13  --  11  --  9   SAUL  --   --   --   --   --   --  9.0  --   --   --  8.5*  --  8.6*  --  8.6*  --  9.2   *  --  117* 84   < >  --  110*   < >  --    < > 102*   < > 84   < > 101*   < > 99   ALBUMIN  --   --   --   --   --   --   --   --   --   --   --   --   --   --  3.4*  --  3.9   PROTTOTAL  --   --    --   --   --   --   --   --   --   --   --   --   --   --  6.8  --  7.9   BILITOTAL  --   --   --   --   --   --   --   --   --   --   --   --   --   --  1.1  --  1.2   ALKPHOS  --   --   --   --   --   --   --   --   --   --   --   --   --   --  99  --  118   ALT  --   --   --   --   --   --   --   --   --   --   --   --   --   --  20  --  24   AST  --   --   --   --   --   --   --   --   --   --   --   --   --   --  41  --  44    < > = values in this interval not displayed.       Most Recent 3 CBC's:  Recent Labs   Lab Test 04/05/24  0937 04/04/24  0659 04/03/24  0721   WBC 3.1* 3.6* 3.3*   HGB 11.9 12.1 11.6*   MCV 93 91 93   * 95* 105*     Most Recent 3 BMP's:  Recent Labs   Lab Test 04/06/24  0803 04/06/24  0529 04/06/24  0100 04/05/24  1835 04/05/24  0937 04/04/24  1325 04/04/24  1324 04/04/24  0814 04/04/24  0659 04/03/24  0915 04/03/24  0721   NA  --   --   --   --  140  --   --   --  138  --  139   POTASSIUM  --   --   --   --  3.8  --  4.0  --  4.1  --  4.0   CHLORIDE  --   --   --   --  103  --   --   --  100  --  101   CO2  --   --   --   --  27  --   --   --  26  --  25   BUN  --   --   --   --  14.1  --   --   --  18.4  --  16.4   CR  --   --   --   --  1.21*  --   --   --  1.65*  --  1.16*   ANIONGAP  --   --   --   --  10  --   --   --  12  --  13   SAUL  --   --   --   --  9.0  --   --   --  8.5*  --  8.6*   * 117* 84   < > 110*   < >  --    < > 102*   < > 84    < > = values in this interval not displayed.     Most Recent 2 LFT's:  Recent Labs   Lab Test 04/02/24  0632 04/01/24  1625   AST 41 44   ALT 20 24   ALKPHOS 99 118   BILITOTAL 1.1 1.2     Most Recent 3 INR's:  Recent Labs   Lab Test 04/06/24  0553 04/05/24  1533 04/04/24  0659   INR 2.91* 3.01* 2.55*     Most Recent 3 Troponin's:  Recent Labs   Lab Test 01/27/20  1025 01/04/18  2327 12/29/16 2010   TROPI <0.015 <0.015 0.774*     Most Recent 3 BNP's:  Recent Labs   Lab Test 02/22/24  1500 06/13/23  1441 01/27/20  1025   NTBNPI  1,177 1,539 957     Most Recent D-dimer:  Recent Labs   Lab Test 06/13/23  1441   DD 1.29*     Most Recent Cholesterol Panel:  Recent Labs   Lab Test 10/19/23  1035   CHOL 136   LDL 78   HDL 42*   TRIG 79     Most Recent 6 Bacteria Isolates From Any Culture (See EPIC Reports for Culture Details):  Recent Labs   Lab Test 07/08/21  0953 04/05/21  0819 03/16/21  1430 02/18/21  1111 01/28/21  1015 12/04/20  1850   CULT >100,000 colonies/mL  Escherichia coli  *  >100,000 colonies/mL  Strain 2  Escherichia coli  * >100,000 colonies/mL  Escherichia coli  * 10,000 to 50,000 colonies/mL  mixed urogenital thao  Susceptibility testing not routinely done   <10,000 colonies/mL  mixed urogenital thao  Susceptibility testing not routinely done   >100,000 colonies/mL  Enterobacter cloacae complex  * >100,000 colonies/mL  Citrobacter freundii complex  *     Most Recent TSH and T4:  Recent Labs   Lab Test 04/04/24  1324   TSH 2.98     Most Recent Hemoglobin A1c:  Recent Labs   Lab Test 02/08/24  1220   A1C 6.1*     Most Recent Urinalysis:  Recent Labs   Lab Test 04/01/24  2030 06/10/23  1951 02/07/23  1000   COLOR Light Yellow   < > Yellow   APPEARANCE Clear   < > Slightly Cloudy*   URINEGLC Negative   < > Negative   URINEBILI Negative   < > Negative   URINEKETONE Negative   < > Negative   SG 1.005   < > 1.025   UBLD Negative   < > Moderate*   URINEPH 8.0*   < > 5.5   PROTEIN Negative   < > Negative   UROBILINOGEN  --   --  0.2   NITRITE Negative   < > Positive*   LEUKEST Negative   < > Large*   RBCU <1   < > 2-5*   WBCU <1   < > >100*    < > = values in this interval not displayed.     Most Recent ESR & CRP:No lab results found.

## 2024-04-07 ENCOUNTER — APPOINTMENT (OUTPATIENT)
Dept: PHYSICAL THERAPY | Facility: CLINIC | Age: 84
DRG: 243 | End: 2024-04-07
Attending: HOSPITALIST
Payer: COMMERCIAL

## 2024-04-07 ENCOUNTER — APPOINTMENT (OUTPATIENT)
Dept: OCCUPATIONAL THERAPY | Facility: CLINIC | Age: 84
DRG: 243 | End: 2024-04-07
Attending: STUDENT IN AN ORGANIZED HEALTH CARE EDUCATION/TRAINING PROGRAM
Payer: COMMERCIAL

## 2024-04-07 LAB
GLUCOSE BLDC GLUCOMTR-MCNC: 107 MG/DL (ref 70–99)
GLUCOSE BLDC GLUCOMTR-MCNC: 113 MG/DL (ref 70–99)
GLUCOSE BLDC GLUCOMTR-MCNC: 115 MG/DL (ref 70–99)
GLUCOSE BLDC GLUCOMTR-MCNC: 116 MG/DL (ref 70–99)
GLUCOSE BLDC GLUCOMTR-MCNC: 129 MG/DL (ref 70–99)
INR PPP: 2.16 (ref 0.85–1.15)
POTASSIUM SERPL-SCNC: 4.1 MMOL/L (ref 3.4–5.3)

## 2024-04-07 PROCEDURE — 250N000011 HC RX IP 250 OP 636: Performed by: INTERNAL MEDICINE

## 2024-04-07 PROCEDURE — 210N000002 HC R&B HEART CARE

## 2024-04-07 PROCEDURE — 97535 SELF CARE MNGMENT TRAINING: CPT | Mod: GO

## 2024-04-07 PROCEDURE — 97165 OT EVAL LOW COMPLEX 30 MIN: CPT | Mod: GO

## 2024-04-07 PROCEDURE — 250N000013 HC RX MED GY IP 250 OP 250 PS 637: Performed by: INTERNAL MEDICINE

## 2024-04-07 PROCEDURE — 84132 ASSAY OF SERUM POTASSIUM: CPT | Performed by: STUDENT IN AN ORGANIZED HEALTH CARE EDUCATION/TRAINING PROGRAM

## 2024-04-07 PROCEDURE — 85610 PROTHROMBIN TIME: CPT | Performed by: STUDENT IN AN ORGANIZED HEALTH CARE EDUCATION/TRAINING PROGRAM

## 2024-04-07 PROCEDURE — 99231 SBSQ HOSP IP/OBS SF/LOW 25: CPT | Performed by: STUDENT IN AN ORGANIZED HEALTH CARE EDUCATION/TRAINING PROGRAM

## 2024-04-07 PROCEDURE — 97116 GAIT TRAINING THERAPY: CPT | Mod: GP

## 2024-04-07 PROCEDURE — 250N000011 HC RX IP 250 OP 636: Performed by: STUDENT IN AN ORGANIZED HEALTH CARE EDUCATION/TRAINING PROGRAM

## 2024-04-07 PROCEDURE — 36415 COLL VENOUS BLD VENIPUNCTURE: CPT | Performed by: STUDENT IN AN ORGANIZED HEALTH CARE EDUCATION/TRAINING PROGRAM

## 2024-04-07 PROCEDURE — 250N000013 HC RX MED GY IP 250 OP 250 PS 637: Performed by: STUDENT IN AN ORGANIZED HEALTH CARE EDUCATION/TRAINING PROGRAM

## 2024-04-07 PROCEDURE — 97530 THERAPEUTIC ACTIVITIES: CPT | Mod: GP

## 2024-04-07 RX ORDER — LANOLIN ALCOHOL/MO/W.PET/CERES
3 CREAM (GRAM) TOPICAL
Status: DISCONTINUED | OUTPATIENT
Start: 2024-04-07 | End: 2024-04-08 | Stop reason: HOSPADM

## 2024-04-07 RX ORDER — PIPERACILLIN SODIUM, TAZOBACTAM SODIUM 2; .25 G/10ML; G/10ML
2.25 INJECTION, POWDER, LYOPHILIZED, FOR SOLUTION INTRAVENOUS EVERY 6 HOURS
Status: COMPLETED | OUTPATIENT
Start: 2024-04-07 | End: 2024-04-07

## 2024-04-07 RX ORDER — WARFARIN SODIUM 4 MG/1
4 TABLET ORAL
Status: COMPLETED | OUTPATIENT
Start: 2024-04-07 | End: 2024-04-07

## 2024-04-07 RX ADMIN — FAMOTIDINE 20 MG: 20 TABLET ORAL at 08:39

## 2024-04-07 RX ADMIN — PIPERACILLIN AND TAZOBACTAM 2.25 G: 2; .25 INJECTION, POWDER, FOR SOLUTION INTRAVENOUS at 01:27

## 2024-04-07 RX ADMIN — FUROSEMIDE 20 MG: 20 TABLET ORAL at 08:40

## 2024-04-07 RX ADMIN — ACETAMINOPHEN 1000 MG: 500 TABLET, FILM COATED ORAL at 08:40

## 2024-04-07 RX ADMIN — MELATONIN TAB 3 MG 3 MG: 3 TAB at 21:14

## 2024-04-07 RX ADMIN — PIPERACILLIN AND TAZOBACTAM 2.25 G: 2; .25 INJECTION, POWDER, FOR SOLUTION INTRAVENOUS at 08:40

## 2024-04-07 RX ADMIN — ACETAMINOPHEN 1000 MG: 500 TABLET, FILM COATED ORAL at 18:21

## 2024-04-07 RX ADMIN — PIPERACILLIN AND TAZOBACTAM 2.25 G: 2; .25 INJECTION, POWDER, FOR SOLUTION INTRAVENOUS at 14:30

## 2024-04-07 RX ADMIN — PIPERACILLIN AND TAZOBACTAM 2.25 G: 2; .25 INJECTION, POWDER, FOR SOLUTION INTRAVENOUS at 19:50

## 2024-04-07 RX ADMIN — WARFARIN SODIUM 4 MG: 4 TABLET ORAL at 18:21

## 2024-04-07 RX ADMIN — ESCITALOPRAM OXALATE 5 MG: 5 TABLET, FILM COATED ORAL at 21:14

## 2024-04-07 ASSESSMENT — ACTIVITIES OF DAILY LIVING (ADL)
ADLS_ACUITY_SCORE: 37
ADLS_ACUITY_SCORE: 34
ADLS_ACUITY_SCORE: 37
ADLS_ACUITY_SCORE: 36
ADLS_ACUITY_SCORE: 34
ADLS_ACUITY_SCORE: 37
ADLS_ACUITY_SCORE: 34
ADLS_ACUITY_SCORE: 37
ADLS_ACUITY_SCORE: 36
ADLS_ACUITY_SCORE: 37
ADLS_ACUITY_SCORE: 36
ADLS_ACUITY_SCORE: 36
ADLS_ACUITY_SCORE: 37
ADLS_ACUITY_SCORE: 37
ADLS_ACUITY_SCORE: 34
ADLS_ACUITY_SCORE: 37
ADLS_ACUITY_SCORE: 37
PREVIOUS_RESPONSIBILITIES: MEAL PREP;HOUSEKEEPING;LAUNDRY;FINANCES
ADLS_ACUITY_SCORE: 36
ADLS_ACUITY_SCORE: 37

## 2024-04-07 NOTE — PROGRESS NOTES
Care Management Follow Up    Length of Stay (days): 3    Expected Discharge Date: 04/08/2024     Concerns to be Addressed: adjustment to diagnosis/illness, care coordination/care conferences     Patient plan of care discussed at interdisciplinary rounds: Yes    Anticipated Discharge Disposition: Transitional Care     Anticipated Discharge Services:    Anticipated Discharge DME:      Patient/family educated on Medicare website which has current facility and service quality ratings:    Education Provided on the Discharge Plan:    Patient/Family in Agreement with the Plan: yes    Referrals Placed by CM/SW:    Private pay costs discussed: Not applicable    Additional Information:  Writer met with patient for an extended amount of time this morning. Patient really would like to go to [facility] but is worried that they will not take her because of issues with her bill. She states that she is prepared to pay all of her $125 if she needs to and/or pay her private room fee up front. Writer explained that we still need to obtain more choices. Patient would like referrals sent to [facility]. Patient would like SW To negotiate with [facility] so that she can go back there. Referrals sent     FRANSISCO Regan

## 2024-04-07 NOTE — PROGRESS NOTES
[id number]  Alert and oriented times four  Room air, 2 L NC at night  PPM site pain treated with tylenol and ice  A 2 W   Purewick in place  Tele; Afib CVR   Plan; continue to monitor and discharge to TCU when able

## 2024-04-07 NOTE — PROGRESS NOTES
04/07/24 0715   Appointment Info   Signing Clinician's Name / Credentials (OT) Jose Gould OTR/L   Living Environment   People in Home alone   Current Living Arrangements apartment   Home Accessibility no concerns   Transportation Anticipated family or friend will provide   Living Environment Comments Pt reports living alone in senior living apartment. Walk in shower w/ shower chair and grab bars.   Self-Care   Usual Activity Tolerance moderate   Current Activity Tolerance fair   Equipment Currently Used at Home walker, rolling;other (see comments)  (Electric scooter)   Fall history within last six months yes   Number of times patient has fallen within last six months 1   Activity/Exercise/Self-Care Comment Pt reports being IND w/ all ADL's at baseline prior to admission.   Instrumental Activities of Daily Living (IADL)   Previous Responsibilities meal prep;housekeeping;laundry;finances   IADL Comments Pt reports being IND w/ most IADL's at baseline. Does simple TV dinners. Has RN that completes med mgmt. Pt does not drive.   General Information   Onset of Illness/Injury or Date of Surgery 04/04/24   Referring Physician Darryl Byrnes MD   Patient/Family Therapy Goal Statement (OT) Get stronger   Additional Occupational Profile Info/Pertinent History of Current Problem Savanna Rehman is a 83 year-old female with past medical history including chronic atrial fibrillation, hypertension, HFpEF, DM2, CRISTIANA, FERREIRA who presents initially to [facility] with n/v and abdominal pain, admitted for diverticulitis. Subsequently with light-headedness and syncopal episode associated with atrial fibrillation with SVR and transferred to [facility] 4/4/2024 for consideration of pacemaker placement.   Existing Precautions/Restrictions fall;pacemaker   Cognitive Status Examination   Orientation Status orientation to person, place and time   Visual Perception   Visual Impairment/Limitations corrective lenses full-time   Sensory    Sensory Comments Digits 1 and 2 in R hand, neuropathy in B feet   Pain Assessment   Patient Currently in Pain Yes, see Vital Sign flowsheet  (7/10)   Range of Motion Comprehensive   General Range of Motion no range of motion deficits identified   Strength Comprehensive (MMT)   General Manual Muscle Testing (MMT) Assessment no strength deficits identified   Bed Mobility   Bed Mobility supine-sit   Supine-Sit Craven (Bed Mobility) modified independence   Assistive Device (Bed Mobility) bed rails   Transfers   Transfers sit-stand transfer;toilet transfer   Sit-Stand Transfer   Sit-Stand Craven (Transfers) contact guard   Assistive Device (Sit-Stand Transfers) walker, front-wheeled   Toilet Transfer   Type (Toilet Transfer) stand-sit;sit-stand   Craven Level (Toilet Transfer) contact guard   Assistive Device (Toilet Transfer) walker, front-wheeled   Activities of Daily Living   BADL Assessment/Intervention lower body dressing;grooming;toileting   Lower Body Dressing Assessment/Training   Position (Lower Body Dressing) edge of bed sitting   Comment, (Lower Body Dressing) SBA   Grooming Assessment/Training   Position (Grooming) sink side   Craven Level (Grooming) contact guard assist   Comment, (Grooming) Per clinical judgement   Toileting   Comment, (Toileting) Per clinical judgement   Craven Level (Toileting) contact guard assist   Clinical Impression   Criteria for Skilled Therapeutic Interventions Met (OT) Yes, treatment indicated   OT Diagnosis Decreased ADL independence and activity tolerance   Influenced by the following impairments Decreased ADL independence   OT Problem List-Impairments impacting ADL problems related to;activity tolerance impaired   Assessment of Occupational Performance 1-3 Performance Deficits   Identified Performance Deficits TB dressing, home mgmt, toileting/toilet transfer   Planned Therapy Interventions (OT) ADL retraining;home program guidelines;progressive  activity/exercise;risk factor education   Clinical Decision Making Complexity (OT) problem focused assessment/low complexity   Risk & Benefits of therapy have been explained evaluation/treatment results reviewed;care plan/treatment goals reviewed;risks/benefits reviewed;current/potential barriers reviewed;patient   OT Total Evaluation Time   OT Eval, Low Complexity Minutes (99180) 10   OT Goals   Therapy Frequency (OT) 5 times/week   OT Predicted Duration/Target Date for Goal Attainment 04/13/24   OT Goals Hygiene/Grooming;Lower Body Dressing;Toilet Transfer/Toileting   OT: Hygiene/Grooming modified independent;while standing   OT: Lower Body Dressing Modified independent;including set-up/clothing retrieval   OT: Toilet Transfer/Toileting Modified independent;toilet transfer;cleaning and garment management   Self-Care/Home Management   Self-Care/Home Mgmt/ADL, Compensatory, Meal Prep Minutes (71322) 24   Symptoms Noted During/After Treatment (Meal Preparation/Planning Training) fatigue   Treatment Detail/Skilled Intervention Pt greeted supine in bed and agreeable for OT evaluation; pt very pleasant throughout. Initial time spent reviewing pacemaker precautions and pt was able to recall. Mod IND sup > sit EOB w/ use of bed rail on L side and increased time needed. Pt able to scoot self foward and maintain seated balance w/ supervision. Additional assist needed for line management.  Pt then able to don B socks w/ SBA while seated EOB utilizing cross leg method. Following completion, sit > stand EOB w/ CGA and use of 4WW; education required for safe hand placement w/ transfer. Improvement noted compared to previous session. Pt ambulated within room w/ CGA and assist for line management. Pt entered bathroom and completed toilet transfer w/ education on hand placement w/ grab bar. Time needed while seated for resting. Pt then trasnferred up and ambulated to bedside chair. Pt remained seated in chair at end of therapy  session w/ all needs met.   OT Discharge Planning   OT Plan G/h standing, increase activity tolerance, toileting   OT Discharge Recommendation (DC Rec) Transitional Care Facility   OT Rationale for DC Rec Pt is currently functioning below baseline d/t decreased activity tolerance. Pt lives alone and reports being IND w/ all ADL's at baseline. Pt is currently requiring assist wll mobility and ADL's for safety. Anticipate pt would benefit from continued rehab at TCU setting to help return to PLOF.   OT Brief overview of current status See above   Total Session Time   Timed Code Treatment Minutes 24   Total Session Time (sum of timed and untimed services) 34

## 2024-04-07 NOTE — PLAN OF CARE
Goal Outcome Evaluation:      Plan of Care Reviewed With: patient  Patient alert, A1 with walker and gait belt. Calls for assist. Up to recliner 3 times. Left chest pacer site has bruising but no change-pain improved today. VSS Afib rare pace beats. Zosyn today, will start oral antibiotics tomorrow. BG wnl, stop checks and insulin. Pt request to change diet from low fiber, not changed by Dr Byrnes, no abd pain. Pt had more formed BM today, not loose. Await placement in TCU.

## 2024-04-07 NOTE — PROGRESS NOTES
[facility]    Medicine Progress Note - Hospitalist Service    Date of Admission:  4/4/2024  Date of Service: 04/07/2024    Assessment & Plan   Savanna Rehman is a 83 year-old female with past medical history including chronic atrial fibrillation, hypertension, HFpEF, DM2, CRISTIANA, FERREIRA who presents initially to [facility] with n/v and abdominal pain, admitted for diverticulitis. Subsequently with light-headedness and syncopal episode associated with atrial fibrillation with SVR and transferred to [facility] 4/4/2024 for consideration of pacemaker placement.     Atrial fibrillation with slow ventricular rate, symptomatic  *Noted to have brief syncopal and persistent light-headedness associated with atrial fibrillation with SVR  *Echocardiogram with EF 55-60%, no significant changes from prior   *TSH normal, not on AV shannon blocking agents  *Evaluated by cardiology who discussed with EP and felt appropriate for pacemaker  Plan:   - EP consulted -> PPM placement 04/05  - Resume warfarin  - Daily INR    Acute sigmoid diverticulitis with abscess  *Presented with left-lower quadrant pain, n/v, diarrhea  *CT abd/pelvis with acute sigmoid diverticulitis with possible adjacent 2.5 x 1.3 x 2.9 cm abscess  *Colorectal surgery following at [facility], not amenable to IR drainage   - Low fiber after PPM placement  - Colorectal surgery consulted -> non surgical  - Piperacillin-tazobactam IV continued -> Augmentin at discharge  - Antiemetics PRN  - Acetaminophen PRN    Chronic HFpEF  Hypertension   *Off fluids   - Daily weights      Acute kidney injury on chronic kidney disease, stage 3  Baseline creatinine 1.1-1.3. Creatinine increased to 1.65 4/4/24. Received contrast 4/1, possibly contributing. Has been receiving frequent ASA as part of Fiorinal, also had furosemide resumed 4/3.   Plan:  - Stop  IVF  - Resume PTA diuretics  - Avoid NSAIDs     Diabetes mellitus, type 2   HgbA1c 6.1% 2/8/2024.  "Prior to admission on glipizide XL 2.5 mg BID.    FERREIRA cirrhosis  Chronic Thrombocytopenia  LFTs wnl.  Plt 139.  CT revealed cirrhotic changes of the liver with associated secondary findings of portal venous hypertension which would include varicosities and splenomegaly. The portal veins are patent.   - Follow up with GI     Chronic diarrhea  Takes loperamide multiple times daily  - Resume loperamide at discharge    Depression  - Continue prior to admission escitalopram     Obstructive sleep apnea  Non-compliant with CPAP  - Oxygen with sleep as needed     Right Buttocks Fluid Collection, suspected hematoma   Incidentally noted on CT imaging is a nonspecific fluid collection in the deep subcutaneous fat over the right buttocks region measuring 7.8 x 3.0 x 5.9 cm.  Likely due to hematoma as pt reports this has been present for months following a prior fall.  - Monitor          Diet: Low Fiber Diet  Room Service    DVT Prophylaxis: Pneumatic Compression Devices  Aguilar Catheter: Not present  Lines: None     Cardiac Monitoring: ACTIVE order. Indication: Post- EP procedure (48 hours)  Code Status: Full Code      Clinically Significant Risk Factors                   # Chronic heart failure with preserved ejection fraction: heart failure noted on problem list and last echo with EF >50%       # Obesity: Estimated body mass index is 37.68 kg/m  as calculated from the following:    Height as of 4/1/24: 1.727 m (5' 8\").    Weight as of this encounter: 112.4 kg (247 lb 12.8 oz)., PRESENT ON ADMISSION      # Pacemaker present       Disposition Plan      Expected Discharge Date: 04/08/2024      Destination: other (comment) (TCU)  Discharge Comments: medically ready - needs placement per 4/7 rounds            Darryl Byrnes MD  Hospitalist Service  [facility]  Securely message with [employer] (more info)  Text page via [facility] Paging/Directory "   ______________________________________________________________________    Interval History     No acute events overnight  No CP/SOB  No fevers  No nausea / vomiting or worsening abdominal pain  No new complaints    Physical Exam   Vital Signs: Temp: 97.7  F (36.5  C) Temp src: Oral BP: (!) 125/96 Pulse: 60   Resp: 18 SpO2: 99 % O2 Device: None (Room air) Oxygen Delivery: 2 LPM  Weight: 247 lbs 12.8 oz    Constitutional: Well-appearing, NAD  Respiratory: Clear to auscultation bilaterally, good air movement, normal effort   Cardiovascular: Irregularly irregular with slow rate. Mild bilateral lower extremity edema   GI: Soft, mild tenderness to palpation in LLQ. No rebound tenderness or guarding.   Skin: Warm, dry   Neurologic: Alert/Oriented x 3. Responding to questions appropriately. Following commands. Benigno.  Psychiatric: Normal affect, appropriate    ----------------------------------------------------------------------------------------    Medical Decision Making       25 MINUTES SPENT BY ME on the date of service doing chart review, history, exam, documentation & further activities per the note.      Data   ------------------------- PAST 24 HR DATA REVIEWED -----------------------------------------------    I have personally reviewed the following data over the past 24 hrs:    N/A  \   N/A   / N/A     N/A N/A N/A /  129 (H)   4.1 N/A N/A \     INR:  2.16 (H) PTT:  N/A   D-dimer:  N/A Fibrinogen:  N/A       Imaging results reviewed over the past 24 hrs:   No results found for this or any previous visit (from the past 24 hour(s)).    ------------------------- ENCOUNTER LABS ----------------------------------------------------------------  Recent Labs   Lab 04/07/24  1251 04/07/24  0824 04/07/24  0542 04/07/24  0529 04/06/24  0803 04/06/24  0553 04/05/24  1835 04/05/24  1533 04/05/24  0937 04/04/24  1325 04/04/24  1324 04/04/24  0814 04/04/24  0659 04/03/24  0915 04/03/24  0721 04/02/24  0849 04/02/24  0632  04/01/24 2139 04/01/24  1625   WBC  --   --   --   --   --   --   --   --  3.1*  --   --   --  3.6*  --  3.3*  --  4.2  --  4.5   HGB  --   --   --   --   --   --   --   --  11.9  --   --   --  12.1  --  11.6*  --  11.7  --  12.9   MCV  --   --   --   --   --   --   --   --  93  --   --   --  91  --  93  --  92  --  92   PLT  --   --   --   --   --   --   --   --  100*  --   --   --  95*  --  105*  --  106*  --  139*   INR  --   --   --  2.16*  --  2.91*  --  3.01*  --   --   --   --  2.55*  --  1.99*   < >  --   --  2.12*   NA  --   --   --   --   --   --   --   --  140  --   --   --  138  --  139  --  138  --  138   POTASSIUM  --   --   --  4.1  --   --   --   --  3.8  --  4.0  --  4.1  --  4.0  --  4.0  --  3.8   CHLORIDE  --   --   --   --   --   --   --   --  103  --   --   --  100  --  101  --  98  --  97*   CO2  --   --   --   --   --   --   --   --  27  --   --   --  26  --  25  --  29  --  32*   BUN  --   --   --   --   --   --   --   --  14.1  --   --   --  18.4  --  16.4  --  18.9  --  20.6   CR  --   --   --   --   --   --   --   --  1.21*  --   --   --  1.65*  --  1.16*  --  1.25*  --  1.18*   ANIONGAP  --   --   --   --   --   --   --   --  10  --   --   --  12  --  13  --  11  --  9   SAUL  --   --   --   --   --   --   --   --  9.0  --   --   --  8.5*  --  8.6*  --  8.6*  --  9.2   * 107* 113*  --    < >  --    < >  --  110*   < >  --    < > 102*   < > 84   < > 101*   < > 99   ALBUMIN  --   --   --   --   --   --   --   --   --   --   --   --   --   --   --   --  3.4*  --  3.9   PROTTOTAL  --   --   --   --   --   --   --   --   --   --   --   --   --   --   --   --  6.8  --  7.9   BILITOTAL  --   --   --   --   --   --   --   --   --   --   --   --   --   --   --   --  1.1  --  1.2   ALKPHOS  --   --   --   --   --   --   --   --   --   --   --   --   --   --   --   --  99  --  118   ALT  --   --   --   --   --   --   --   --   --   --   --   --   --   --   --   --  20  --  24   AST  --   --    --   --   --   --   --   --   --   --   --   --   --   --   --   --  41  --  44    < > = values in this interval not displayed.       Most Recent 3 CBC's:  Recent Labs   Lab Test 04/05/24  0937 04/04/24  0659 04/03/24 0721   WBC 3.1* 3.6* 3.3*   HGB 11.9 12.1 11.6*   MCV 93 91 93   * 95* 105*     Most Recent 3 BMP's:  Recent Labs   Lab Test 04/07/24  1251 04/07/24  0824 04/07/24  0542 04/07/24  0529 04/05/24  1835 04/05/24  0937 04/04/24  1325 04/04/24  1324 04/04/24  0814 04/04/24  0659 04/03/24  0915 04/03/24  0721   NA  --   --   --   --   --  140  --   --   --  138  --  139   POTASSIUM  --   --   --  4.1  --  3.8  --  4.0  --  4.1  --  4.0   CHLORIDE  --   --   --   --   --  103  --   --   --  100  --  101   CO2  --   --   --   --   --  27  --   --   --  26  --  25   BUN  --   --   --   --   --  14.1  --   --   --  18.4  --  16.4   CR  --   --   --   --   --  1.21*  --   --   --  1.65*  --  1.16*   ANIONGAP  --   --   --   --   --  10  --   --   --  12  --  13   SAUL  --   --   --   --   --  9.0  --   --   --  8.5*  --  8.6*   * 107* 113*  --    < > 110*   < >  --    < > 102*   < > 84    < > = values in this interval not displayed.     Most Recent 2 LFT's:  Recent Labs   Lab Test 04/02/24  0632 04/01/24  1625   AST 41 44   ALT 20 24   ALKPHOS 99 118   BILITOTAL 1.1 1.2     Most Recent 3 INR's:  Recent Labs   Lab Test 04/07/24  0529 04/06/24  0553 04/05/24  1533   INR 2.16* 2.91* 3.01*     Most Recent 3 Troponin's:  Recent Labs   Lab Test 01/27/20  1025 01/04/18  2327 12/29/16  2010   TROPI <0.015 <0.015 0.774*     Most Recent 3 BNP's:  Recent Labs   Lab Test 02/22/24  1500 06/13/23  1441 01/27/20  1025   NTBNPI 1,177 1,539 957     Most Recent D-dimer:  Recent Labs   Lab Test 06/13/23  1441   DD 1.29*     Most Recent Cholesterol Panel:  Recent Labs   Lab Test 10/19/23  1035   CHOL 136   LDL 78   HDL 42*   TRIG 79     Most Recent 6 Bacteria Isolates From Any Culture (See EPIC Reports for Culture  Details):  Recent Labs   Lab Test 07/08/21  0953 04/05/21  0819 03/16/21  1430 02/18/21  1111 01/28/21  1015 12/04/20  1850   CULT >100,000 colonies/mL  Escherichia coli  *  >100,000 colonies/mL  Strain 2  Escherichia coli  * >100,000 colonies/mL  Escherichia coli  * 10,000 to 50,000 colonies/mL  mixed urogenital thao  Susceptibility testing not routinely done   <10,000 colonies/mL  mixed urogenital thao  Susceptibility testing not routinely done   >100,000 colonies/mL  Enterobacter cloacae complex  * >100,000 colonies/mL  Citrobacter freundii complex  *     Most Recent TSH and T4:  Recent Labs   Lab Test 04/04/24  1324   TSH 2.98     Most Recent Hemoglobin A1c:  Recent Labs   Lab Test 02/08/24  1220   A1C 6.1*     Most Recent Urinalysis:  Recent Labs   Lab Test 04/01/24  2030 06/10/23  1951 02/07/23  1000   COLOR Light Yellow   < > Yellow   APPEARANCE Clear   < > Slightly Cloudy*   URINEGLC Negative   < > Negative   URINEBILI Negative   < > Negative   URINEKETONE Negative   < > Negative   SG 1.005   < > 1.025   UBLD Negative   < > Moderate*   URINEPH 8.0*   < > 5.5   PROTEIN Negative   < > Negative   UROBILINOGEN  --   --  0.2   NITRITE Negative   < > Positive*   LEUKEST Negative   < > Large*   RBCU <1   < > 2-5*   WBCU <1   < > >100*    < > = values in this interval not displayed.     Most Recent ESR & CRP:No lab results found.

## 2024-04-08 ENCOUNTER — DOCUMENTATION ONLY (OUTPATIENT)
Dept: ANTICOAGULATION | Facility: CLINIC | Age: 84
End: 2024-04-08
Payer: COMMERCIAL

## 2024-04-08 ENCOUNTER — TELEPHONE (OUTPATIENT)
Dept: NURSING | Facility: CLINIC | Age: 84
End: 2024-04-08
Payer: COMMERCIAL

## 2024-04-08 VITALS
HEART RATE: 60 BPM | DIASTOLIC BLOOD PRESSURE: 67 MMHG | WEIGHT: 245.5 LBS | BODY MASS INDEX: 37.33 KG/M2 | OXYGEN SATURATION: 97 % | RESPIRATION RATE: 20 BRPM | TEMPERATURE: 97.6 F | SYSTOLIC BLOOD PRESSURE: 146 MMHG

## 2024-04-08 DIAGNOSIS — I48.20 CHRONIC ATRIAL FIBRILLATION (H): ICD-10-CM

## 2024-04-08 DIAGNOSIS — I48.21 PERMANENT ATRIAL FIBRILLATION (H): Primary | ICD-10-CM

## 2024-04-08 LAB
ATRIAL RATE - MUSE: 326 BPM
DIASTOLIC BLOOD PRESSURE - MUSE: NORMAL MMHG
GLUCOSE BLDC GLUCOMTR-MCNC: 114 MG/DL (ref 70–99)
INR PPP: 1.7 (ref 0.85–1.15)
INTERPRETATION ECG - MUSE: NORMAL
P AXIS - MUSE: NORMAL DEGREES
POTASSIUM SERPL-SCNC: 4 MMOL/L (ref 3.4–5.3)
PR INTERVAL - MUSE: NORMAL MS
QRS DURATION - MUSE: 112 MS
QT - MUSE: 532 MS
QTC - MUSE: 454 MS
R AXIS - MUSE: -30 DEGREES
SYSTOLIC BLOOD PRESSURE - MUSE: NORMAL MMHG
T AXIS - MUSE: 0 DEGREES
VENTRICULAR RATE- MUSE: 44 BPM

## 2024-04-08 PROCEDURE — 250N000013 HC RX MED GY IP 250 OP 250 PS 637: Performed by: STUDENT IN AN ORGANIZED HEALTH CARE EDUCATION/TRAINING PROGRAM

## 2024-04-08 PROCEDURE — 99239 HOSP IP/OBS DSCHRG MGMT >30: CPT | Performed by: STUDENT IN AN ORGANIZED HEALTH CARE EDUCATION/TRAINING PROGRAM

## 2024-04-08 PROCEDURE — 84132 ASSAY OF SERUM POTASSIUM: CPT | Performed by: STUDENT IN AN ORGANIZED HEALTH CARE EDUCATION/TRAINING PROGRAM

## 2024-04-08 PROCEDURE — 36415 COLL VENOUS BLD VENIPUNCTURE: CPT | Performed by: STUDENT IN AN ORGANIZED HEALTH CARE EDUCATION/TRAINING PROGRAM

## 2024-04-08 PROCEDURE — 85610 PROTHROMBIN TIME: CPT | Performed by: STUDENT IN AN ORGANIZED HEALTH CARE EDUCATION/TRAINING PROGRAM

## 2024-04-08 PROCEDURE — 250N000013 HC RX MED GY IP 250 OP 250 PS 637: Performed by: INTERNAL MEDICINE

## 2024-04-08 RX ORDER — WARFARIN SODIUM 5 MG/1
5 TABLET ORAL
Status: DISCONTINUED | OUTPATIENT
Start: 2024-04-08 | End: 2024-04-08

## 2024-04-08 RX ORDER — WARFARIN SODIUM 5 MG/1
5 TABLET ORAL ONCE
Status: COMPLETED | OUTPATIENT
Start: 2024-04-08 | End: 2024-04-08

## 2024-04-08 RX ADMIN — ACETAMINOPHEN 1000 MG: 500 TABLET, FILM COATED ORAL at 06:15

## 2024-04-08 RX ADMIN — FUROSEMIDE 20 MG: 20 TABLET ORAL at 08:47

## 2024-04-08 RX ADMIN — AMOXICILLIN AND CLAVULANATE POTASSIUM 1 TABLET: 875; 125 TABLET, FILM COATED ORAL at 08:47

## 2024-04-08 RX ADMIN — WARFARIN SODIUM 5 MG: 5 TABLET ORAL at 12:21

## 2024-04-08 RX ADMIN — FAMOTIDINE 20 MG: 20 TABLET ORAL at 08:47

## 2024-04-08 ASSESSMENT — ACTIVITIES OF DAILY LIVING (ADL)
ADLS_ACUITY_SCORE: 37
ADLS_ACUITY_SCORE: 37
ADLS_ACUITY_SCORE: 38
ADLS_ACUITY_SCORE: 37
ADLS_ACUITY_SCORE: 36
ADLS_ACUITY_SCORE: 38
ADLS_ACUITY_SCORE: 37

## 2024-04-08 NOTE — PLAN OF CARE
Physical Therapy Discharge Summary    Reason for therapy discharge:    Discharged to transitional care facility.    Progress towards therapy goal(s). See goals on Care Plan in [facility] electronic health record for goal details.  Goals partially met.  Barriers to achieving goals:   discharge from facility.    Therapy recommendation(s):    Continued therapy is recommended.  Rationale/Recommendations:  To further increase independence with mobility.

## 2024-04-08 NOTE — PROGRESS NOTES
Care Management Discharge Note    Discharge Date: 04/08/2024       Discharge Disposition: Transitional Care    Discharge Services:  N/A    Discharge DME:  N/A    Discharge Transportation: [facility] wheelchair    Private pay costs discussed: transportation costs, Private room fees    Does the patient's insurance plan have a 3 day qualifying hospital stay waiver?  Yes     Which insurance plan 3 day waiver is available? Alternative insurance waiver    Will the waiver be used for post-acute placement? Yes    PAS Confirmation Code:  [id number]  Patient/family educated on Medicare website which has current facility and service quality ratings:      Education Provided on the Discharge Plan:  yes  Persons Notified of Discharge Plans: Patient, Huc, charge RN, bedside RN, TCU, MD  Patient/Family in Agreement with the Plan: yes    Handoff Referral Completed: Yes    Additional Information:  Confirmed with Dr. Byrnes that he will work on discharge orders. Writer called [facility] transport and spoke with Humphrey. Wheelchair ride arranged for today at [phone]. Writer updated patient who is in agreement. Writer updated bedside RN and charge RN. Writer called Chela with [facility] admissions and updated her on transport time. She is in agreement. PAS completed and placed on chart. Discharge orders received and faxed via [employer].     PAS-RR    D: Per DHS regulation, SW completed and submitted PAS-RR to [place] [employer] Direct Connect via the Senior LinkAge Line.  PAS-RR confirmation # is : [id number]    I: SW spoke with patient and they are aware a PAS-RR has been submitted.  SW reviewed with patient that they may be contacted for a follow up appointment within 10 days of hospital discharge if their SNF stay is < 30 days.  Contact information for [facility] LinkAge Line was also provided.    A: patient verbalized understanding.    P: Further questions may be directed to [facility] LinkAge Line at #[phone], option #4 for PAS-RR staff.     Mercedes Flynn,  ANA LUISA, SIENA   Social Work   [facility]

## 2024-04-08 NOTE — PROGRESS NOTES
"ANTICOAGULATION  MANAGEMENT: Discharge Review    Savanna Rehman chart reviewed for anticoagulation continuity of care    Hospital Admission on -24 for a.fib, acute sigmoid diverticulitis with abscess.    Discharge disposition:  Long term care facility  [facility]  Skilled Nursing  [address]  [place] [id number] [phone]    Results:    Recent labs: (last 7 days)     24  1625 24  1719 24  0721 24  0659 24  1533 24  0553 24  0529 24  0537   INR 2.12* 2.04* 1.99* 2.55* 3.01* 2.91* 2.16* 1.70*     Anticoagulation inpatient management:     -6- 2 mg  - 4 mg  - 5 mg    Anticoagulation discharge instructions:     Warfarin dosin mg on , recheck at TCU 24   Bridging: No   INR goal change: No      Medication changes affecting anticoagulation: Yes: Augmentin for 10 days -24    Additional factors affecting anticoagulation: Yes: Diarrhea inpatient and per CT notes \"CT imaging is a nonspecific fluid collection in the deep subcutaneous fat over the right buttocks region measuring 7.8 x 3.0 x 5.9 cm.  Likely due to hematoma as pt reports this has been present for months following a prior fall.\"     PLAN     No adjustment to anticoagulation plan needed    ACC will resume monitoring upon discharge from TCU    Anticoagulation Calendar updated    Iris Kemp RN  "

## 2024-04-08 NOTE — PROGRESS NOTES
[id number]  Alert and oriented times four  Room air, 2 L NC at night  Denies pain  A 1 W  Tele ; Afib CVR  Plan: continue to monitor and TCU placement

## 2024-04-08 NOTE — DISCHARGE SUMMARY
"[facility]  Hospitalist Discharge Summary      Date of Admission:  4/4/2024  Date of Discharge:  4/8/2024  Discharging Provider: Darryl Byrnes MD  Discharge Service: Hospitalist Service    Discharge Diagnoses     Atrial fibrillation with slow ventricular rate, symptomatic   Acute sigmoid diverticulitis with abscess     Clinically Significant Risk Factors     # Obesity: Estimated body mass index is 37.33 kg/m  as calculated from the following:    Height as of 4/1/24: 1.727 m (5' 8\").    Weight as of this encounter: 111.4 kg (245 lb 8 oz).       Follow-ups Needed After Discharge   Follow-up Appointments     Follow Up and recommended labs and tests      Follow up with Nursing home physician.  Check INR.            Unresulted Labs Ordered in the Past 30 Days of this Admission       No orders found from 3/5/2024 to 4/5/2024.          Discharge Disposition   Discharged to short-term care facility  Condition at discharge: Stable    Hospital Course   Savanna Rehman is a 83 year-old female with past medical history including chronic atrial fibrillation, hypertension, HFpEF, DM2, CRISTIANA, FERREIRA who presents initially to [facility] with n/v and abdominal pain, admitted for diverticulitis. Subsequently with light-headedness and syncopal episode associated with atrial fibrillation with SVR and transferred to [facility] 4/4/2024 for consideration of pacemaker placement.     Atrial fibrillation with slow ventricular rate, symptomatic  *Noted to have brief syncopal and persistent light-headedness associated with atrial fibrillation with SVR  *Echocardiogram with EF 55-60%, no significant changes from prior   *TSH normal, not on AV shannon blocking agents  *Evaluated by cardiology who discussed with EP and felt appropriate for pacemaker  Plan:   - EP consulted -> PPM placement 04/05  - Resume warfarin  - Follow up INR at discharge    Acute sigmoid diverticulitis with abscess  *Presented with left-lower " quadrant pain, n/v, diarrhea  *CT abd/pelvis with acute sigmoid diverticulitis with possible adjacent 2.5 x 1.3 x 2.9 cm abscess  *Colorectal surgery following at [facility], not amenable to IR drainage   - Low fiber after PPM placement  - Colorectal surgery consulted -> non surgical  - Piperacillin-tazobactam IV continued -> Augmentin at discharge  - Tylenol PRN    Chronic HFpEF  Hypertension   *Off fluids   Stable with no evidence of exacerbation    Acute kidney injury on chronic kidney disease, stage 3  Baseline creatinine 1.1-1.3. Creatinine increased to 1.65 4/4/24. Received contrast 4/1, possibly contributing. Has been receiving frequent ASA as part of Fiorinal, also had furosemide resumed 4/3.   Plan:  - Stop  IVF  - Resume PTA diuretics  - Avoid NSAIDs     Diabetes mellitus, type 2   HgbA1c 6.1% 2/8/2024. Prior to admission on glipizide XL 2.5 mg BID.    FERREIRA cirrhosis  Chronic Thrombocytopenia  LFTs wnl.  Plt 139.  CT revealed cirrhotic changes of the liver with associated secondary findings of portal venous hypertension which would include varicosities and splenomegaly. The portal veins are patent.   - Follow up with GI     Chronic diarrhea  Takes loperamide multiple times daily  - Resume loperamide at discharge    Depression  - Continue prior to admission escitalopram     Obstructive sleep apnea  Non-compliant with CPAP  - Oxygen with sleep as needed     Right Buttocks Fluid Collection, suspected hematoma   Incidentally noted on CT imaging is a nonspecific fluid collection in the deep subcutaneous fat over the right buttocks region measuring 7.8 x 3.0 x 5.9 cm.  Likely due to hematoma as pt reports this has been present for months following a prior fall.  - Monitor    Consultations This Hospital Stay   ELECTROPHYSIOLOGY IP CONSULT  COLORECTAL SURGERY IP CONSULT  CARE MANAGEMENT / SOCIAL WORK IP CONSULT  PHARMACY TO DOSE WARFARIN  PHYSICAL THERAPY ADULT IP CONSULT  OCCUPATIONAL THERAPY ADULT IP  CONSULT  CARE MANAGEMENT / SOCIAL WORK IP CONSULT  PHYSICAL THERAPY ADULT IP CONSULT  OCCUPATIONAL THERAPY ADULT IP CONSULT    Code Status   Full Code    Time Spent on this Encounter   I, Darryl Byrnes MD, personally saw the patient today and spent greater than 30 minutes discharging this patient.       Darryl Byrnes MD  [facility]ON[facility]ARE UN[facility]  [address], [address]  [place] [address]  Phone: [phone]  ______________________________________________________________________    Physical Exam   Vital Signs: Temp: 97.6  F (36.4  C) Temp src: Oral BP: (!) 146/67 Pulse: 60   Resp: 20 SpO2: 97 % O2 Device: None (Room air) Oxygen Delivery: 2 LPM  Weight: 245 lbs 8 oz  ----------------------------------------------------------------------------------------       Primary Care Physician   Taylor Payan    Discharge Orders      Medication Therapy Management Referral      General info for SNF    Length of Stay Estimate: Short Term Care: Estimated # of Days <30  Condition at Discharge: Stable  Level of care:skilled   Rehabilitation Potential: Fair  Admission H&P remains valid and up-to-date: Yes  Recent Chemotherapy: N/A  Use Nursing Home Standing Orders: Yes     Mantoux instructions    Give two-step Mantoux (PPD) Per Facility Policy Yes     Intake and output    Every shift     Daily weights    Call Provider for weight gain of more than 2 pounds per day or 5 pounds per week.     Reason for your hospital stay    You had abnormal heart rhythm and had a pacemaker placed and had abdominal infection needing IV antibiotics.     Activity - Up with assistive device     Follow Up and recommended labs and tests    Follow up with Nursing home physician.  Check INR.     Full Code     Physical Therapy Adult Consult    Evaluate and treat as clinically indicated.    Reason:  physical deconditioning     Occupational Therapy Adult Consult    Evaluate and treat as clinically indicated.    Reason:  physical  deconditioning     Fall precautions     Diet    Follow this diet upon discharge: Orders Placed This Encounter      Room Service      Low Fiber Diet       Significant Results and Procedures   Most Recent 3 CBC's:  Recent Labs   Lab Test 04/05/24  0937 04/04/24  0659 04/03/24 0721   WBC 3.1* 3.6* 3.3*   HGB 11.9 12.1 11.6*   MCV 93 91 93   * 95* 105*     Most Recent 3 BMP's:  Recent Labs   Lab Test 04/08/24  0748 04/08/24  0537 04/07/24  1659 04/07/24  1251 04/07/24  0542 04/07/24  0529 04/05/24  1835 04/05/24  0937 04/04/24  0814 04/04/24  0659 04/03/24  0915 04/03/24  0721   NA  --   --   --   --   --   --   --  140  --  138  --  139   POTASSIUM  --  4.0  --   --   --  4.1  --  3.8   < > 4.1  --  4.0   CHLORIDE  --   --   --   --   --   --   --  103  --  100  --  101   CO2  --   --   --   --   --   --   --  27  --  26  --  25   BUN  --   --   --   --   --   --   --  14.1  --  18.4  --  16.4   CR  --   --   --   --   --   --   --  1.21*  --  1.65*  --  1.16*   ANIONGAP  --   --   --   --   --   --   --  10  --  12  --  13   SAUL  --   --   --   --   --   --   --  9.0  --  8.5*  --  8.6*   *  --  116* 129*   < >  --    < > 110*   < > 102*   < > 84    < > = values in this interval not displayed.     Most Recent 2 LFT's:  Recent Labs   Lab Test 04/02/24  0632 04/01/24  1625   AST 41 44   ALT 20 24   ALKPHOS 99 118   BILITOTAL 1.1 1.2     Most Recent 3 INR's:  Recent Labs   Lab Test 04/08/24  0537 04/07/24  0529 04/06/24  0553   INR 1.70* 2.16* 2.91*     Most Recent 3 Troponin's:  Recent Labs   Lab Test 01/27/20  1025 01/04/18  2327 12/29/16  2010   TROPI <0.015 <0.015 0.774*     Most Recent 3 BNP's:  Recent Labs   Lab Test 02/22/24  1500 06/13/23  1441 01/27/20  1025   NTBNPI 1,177 1,539 957     Most Recent D-dimer:  Recent Labs   Lab Test 06/13/23  1441   DD 1.29*     Most Recent Cholesterol Panel:  Recent Labs   Lab Test 10/19/23  1035   CHOL 136   LDL 78   HDL 42*   TRIG 79     7-Day Micro Results        No results found for the last 168 hours.          Most Recent Urinalysis:  Recent Labs   Lab Test 04/01/24  2030 06/10/23  1951 02/07/23  1000   COLOR Light Yellow   < > Yellow   APPEARANCE Clear   < > Slightly Cloudy*   URINEGLC Negative   < > Negative   URINEBILI Negative   < > Negative   URINEKETONE Negative   < > Negative   SG 1.005   < > 1.025   UBLD Negative   < > Moderate*   URINEPH 8.0*   < > 5.5   PROTEIN Negative   < > Negative   UROBILINOGEN  --   --  0.2   NITRITE Negative   < > Positive*   LEUKEST Negative   < > Large*   RBCU <1   < > 2-5*   WBCU <1   < > >100*    < > = values in this interval not displayed.     Most Recent ESR & CRP:No lab results found.,   Results for orders placed or performed during the hospital encounter of 04/04/24   X-ray Chest 2 vws*    Narrative    EXAM: XR CHEST 2 VIEWS  LOCATION: [facility]  DATE: 4/6/2024    INDICATION: Status post pacer ICD  COMPARISON: 02/22/2024      Impression    IMPRESSION:     New left subclavian approach single pacer with lead tip near the right ventricle.    Mild basilar atelectasis. Small pleural effusions are suspected on the lateral view. Stable cardiomediastinal silhouette size. No pneumothorax.     EP Device    Narrative    Raul Plunkett MD     4/5/2024 12:19 PM  PROCEDURES PERFORMED:  - Implantation of single-chamber permanent pacemaker with left   bundle pacing  - Cardiac fluoroscopy  - Conscious sedation, mild-moderate level    INDICATIONS:  Symptomatic bradycardia, chronic persistent atrial fibrillation      PROCEDURE:  The benefits and risks of the procedure were discussed with the   patient in detail; the patient expressed understanding.  Informed   consent was obtained and placed in the chart.  I determined this   patient to be an appropriate candidate for the planned sedation   and procedure and have reassessed the patient immediately prior   to sedation and procedure. The patient was brought to the EP lab   in  "the fasting, non-sedated state.  We continuously monitored   EKG, vital signs, oxygenation and level of sedation.  I was   present during the entire time that conscious sedation was   provided.  Antibiotic prophylaxis was administered.  The chest   was prepped and draped in the usual sterile fashion.      Local anesthetic was administered.  Access to the axillary vein   was obtained with ultrasound guidance using the modified   Seldinger technique.  One wire was advanced to the IVC.  An   incision was made in the left pectoral area.  Dissection was   carried down to the myofascial plane and then continued caudally   to form the pocket.  Adequate hemostasis was obtained.      One 7.0 Fr sheath was introduced for the ventricular lead.  A His   sheath was advanced over a long wire into the mid right   ventricle.  The pacemaker lead was advanced through the sheath.    Pacing here showed an initial \"W\" pattern in lead V1.  The lead   was screwed in incrementally under fluoroscopy and checked at   each step.  The pacing morphology was not ideal so the lead was   retracted.  The sheath was repositioned to a slightly more   proximal location on the septum and the lead was advanced through   the sheath.  Pacing here again showed an initial \"W\" pattern.    The lead was screwed in incrementally and pacing parameters were   checked at each step.  LVAT was measured at 71 ms. The lead had   sensing, impedance, and threshold.  The sheathes were peeled away   and both leads were secured to the pectoral muscle using Ethibond   sutures.    Normal saline was used to irrigate the pocket. The generator was   securely attached to the lead, placed in a Tyrx antimicrobial   pouch, and then placed in the pocket. The device was sutured in   the pocket with an Ethibond  suture. The pocket was closed in   layers.  The deep layers were closed using 2.0 and 3.0 Vicryl and   the subcuticular layer was closed with 4.0 Vicryl suture.   Dermabond " was applied to the skin. The incision was covered with   a sterile dressing.    There was minimal blood loss (<30 mL) and no immediate   complications.  The patient tolerated the procedure well.    Implanted Hardware and Parameters:  Implant Name Type Inv. Item Serial No.  Lot No. [id number]   No. Used Action   LEAD PACEMAKER SELECTSECURE 69CM MD  3830-69 - [id number] Leads   LEAD PACEMAKER SELECTSECURE 69CM MD  3830-69 [id number]   [employer] [id number]  1 Implanted   PACEMAKER DOMINIC XT SR MRI SYS - [id number] Pacemaker PACEMAKER   DOMINIC XT SR MRI SYS [id number] [employer] [id number]  1   Implanted           CONCLUSIONS:  - Successful implantation of a single-chamber permanent pacemaker   with left bundle pacing  - Device check and chest X-ray tomorrow morning   - Okay to continue PO anticoagulation   - Routine follow up after discharge      Raul Plunkett MD       *Note: Due to a large number of results and/or encounters for the requested time period, some results have not been displayed. A complete set of results can be found in Results Review.       Discharge Medications   Current Discharge Medication List        START taking these medications    Details   amoxicillin-clavulanate (AUGMENTIN) 875-125 MG tablet Take 1 tablet by mouth 2 times daily for 10 days    Associated Diagnoses: Colonic diverticular abscess           CONTINUE these medications which have NOT CHANGED    Details   acetaminophen (TYLENOL) 500 MG tablet Take 1,000 mg by mouth every 6 hours as needed for mild pain      Cholecalciferol (VITAMIN D-3) 125 MCG (5000 UT) TABS Take 5,000 Units by mouth daily    Associated Diagnoses: Vitamin D deficiency      escitalopram (LEXAPRO) 5 MG tablet Take 1 tablet by mouth once daily  Qty: 30 tablet, Refills: 0    Associated Diagnoses: Moderate episode of recurrent major depressive disorder (H)      famotidine (PEPCID) 20 MG tablet Take 20 mg by mouth 2 times daily      furosemide (LASIX) 20 MG  tablet Take 1 tablet (20 mg) by mouth daily  Qty: 30 tablet, Refills: 1    Associated Diagnoses: Congestive heart failure, unspecified HF chronicity, unspecified heart failure type (H)      glipiZIDE (GLUCOTROL XL) 2.5 MG 24 hr tablet Take 1 tablet by mouth twice daily  Qty: 180 tablet, Refills: 0    Associated Diagnoses: Diabetic polyneuropathy associated with type 2 diabetes mellitus (H)      indapamide (LOZOL) 2.5 MG tablet Take 1 tablet (2.5 mg) by mouth every morning  Qty: 90 tablet, Refills: 1    Associated Diagnoses: Swelling of lower leg      loperamide (IMODIUM) 2 MG capsule TAKE 1 CAPSULE BY MOUTH 4 TIMES DAILY AS NEEDED FOR DIARRHEA  Qty: 60 capsule, Refills: 0    Associated Diagnoses: Diarrhea, unspecified type      !! warfarin ANTICOAGULANT (COUMADIN) 2.5 MG tablet Take 3.75 mg by mouth six times a week Daily except on Mondays      !! warfarin ANTICOAGULANT (COUMADIN) 2.5 MG tablet Take 2.5 mg by mouth once a week On Monday       !! - Potential duplicate medications found. Please discuss with provider.        Allergies   Allergies   Allergen Reactions    Augmented Betamethasone Diprop [Betamethasone] Other (See Comments)     Severe yeast infection    Petroleum Jelly [Petrolatum] Anaphylaxis     Rash and swelling    Shellfish-Derived Products Anaphylaxis     Tongue swelling    Aspirin Swelling     tiongue swelling    Bacitracin      Rash swelling    Bactrim [Sulfamethoxazole-Trimethoprim] Dizziness    Coumadin [Warfarin] Swelling     Leg swelling    Darvon [Propoxyphene] Swelling     Throat closes    Dilaudid [Hydromorphone]      No side effects from fentanyl June 2023    Levaquin [Levofloxacin] Swelling     Tongue swelling    Lidocaine     Morphine Unknown     [facility]    Neomycin Swelling     rash    Neosporin [Neomycin-Polymyxin-Gramicidin] Swelling     rash    Nitrofurantoin      SOB, GI upset,    Oxycodone      Severe itching    Percocet [Oxycodone-Acetaminophen] Unknown     Percodan [Oxycodone-Aspirin]      Severe itching    Polymyxin B     Pramoxine     Tramadol     Vicodin [Hydrocodone-Acetaminophen]      Severe itching      Xarelto [Rivaroxaban]     Adhesive Tape Rash     Band aids     Codeine Rash    Hydrocortisone Rash and Swelling    Other Environmental Allergy Rash     Adhesive tape   Band aids

## 2024-04-08 NOTE — PLAN OF CARE
Goal Outcome Evaluation:    Pt discharging via wheelchair with EMS. All belongings with patient. Discharge instructions provided and questions encouraged. Denies pain. IV discontinued, paperwork printed and given to EMS. Pt leaving in stable condition to [facility] in [place].

## 2024-04-08 NOTE — PROGRESS NOTES
Care Management Follow Up    Length of Stay (days): 4    Expected Discharge Date: 04/08/2024     Concerns to be Addressed: adjustment to diagnosis/illness, care coordination/care conferences     Patient plan of care discussed at interdisciplinary rounds: Yes    Anticipated Discharge Disposition: Transitional Care     Anticipated Discharge Services:    Anticipated Discharge DME:      Patient/family educated on Medicare website which has current facility and service quality ratings:    Education Provided on the Discharge Plan:    Patient/Family in Agreement with the Plan: yes    Referrals Placed by CM/SW:    Private pay costs discussed: Not applicable    Additional Information:  Writer called Chela in admissions at [facility] to determine status of referral. Chela reports she needs to send it for review and will let writer know within the next hour.       Addendum 1120: Spoke with Chela in admissions at [facility]. She can accept patient anytime today into a private room of $36/daily. Writer spoke to Rosanna at [facility], they also have a bed for patient in a semi-private or private ($45/daily). Writer met with patient and friend at bedside. Patient confirmed the private room at [facility] and is in agreement with costs. Writer and patient discussed transportation and patient asking for a wheelchair ride to be arranged and is aware of potential out of pocket costs. Writer explained she will page the MD for orders and get a ride set up. Writer will update patient once ride is arranged.     ANA LUISA Lerma, SW   Social Work   [facility]

## 2024-04-08 NOTE — PROGRESS NOTES
Post Device Implant Instructions    Dressing:  Clean, dry, and intact (can be removed today prior to discharge)  Chest XR reviewed:  Yes  Pneumo present?:  No  Lead Measurements per Device Rep:  WNL    Education Provided:  - Avoid raising left arm above the level of the shoulder for 3 weeks.  - Do not shower until outer occlusive dressing has been removed.  - Remove outer dressing in 3 - 4 days.  - Leave steri-strips in place (if present), these will be removed at the 1 week check.   - Call Device Clinic with increased swelling, drainage, or fever > 101 degrees.   - Follow-up with the Device Clinic as scheduled.     Pt verbalized understanding of instructions and AVS updated.      CLIFFORD Campuzano

## 2024-04-08 NOTE — TELEPHONE ENCOUNTER
Patient calling. She's currently in the hospital. She has an office visit scheduled with her PCP, and is wondering if it could be changed over to a virtual visit. Patient's next appointment with Dr. Payan is scheduled for 4/24/2024. Patient states that she will be going to rehab for 2 weeks, and wants to notify her provider.     Routed to Dr. ROSEMARY Payan per patient request.     Yi Jiménez RN  [facility]  April 8, 2024, 6:29 AM

## 2024-04-08 NOTE — PLAN OF CARE
Occupational Therapy Discharge Summary    Reason for therapy discharge:    Discharged to transitional care facility.    Progress towards therapy goal(s). See goals on Care Plan in [facility] electronic health record for goal details.  Goals partially met.  Barriers to achieving goals:   discharge from facility.    Therapy recommendation(s):    Continued therapy is recommended.  Rationale/Recommendations:  to progress I/ADL independence prior to return home.

## 2024-04-09 ENCOUNTER — PATIENT OUTREACH (OUTPATIENT)
Dept: CARE COORDINATION | Facility: CLINIC | Age: 84
End: 2024-04-09
Payer: COMMERCIAL

## 2024-04-09 ENCOUNTER — DOCUMENTATION ONLY (OUTPATIENT)
Dept: GERIATRICS | Facility: CLINIC | Age: 84
End: 2024-04-09
Payer: COMMERCIAL

## 2024-04-09 ENCOUNTER — TELEPHONE (OUTPATIENT)
Dept: GERIATRICS | Facility: CLINIC | Age: 84
End: 2024-04-09
Payer: COMMERCIAL

## 2024-04-09 NOTE — LETTER
[facility]   To:             Please give to facility    From:   Jen Gramajo RN  Care Coordinator   [facility]   P: [phone]  [email or web address]   Patient Name:  Savanna Rehman YOB: 1940   Admit date: 4/8/24      *Information Needed:  Please contact me when the patient will discharge (or if they will move to long term care)- include the discharge date, disposition, and main diagnosis   If the patient is discharged with home care services, please provide the name of the agency    Also- Please inform me if a care conference is being held.   Phone, Fax or Email with information                        Thank you

## 2024-04-09 NOTE — PROGRESS NOTES
STEWART created a new program for Primary Care-Care Coordination for patient who is established within the [facility] system and is eligible for Clinic Care Coordination.    Connected [facility]    Background: Transitional Care Management program identified per system criteria and reviewed by [facility] Resource [place] team for possible outreach.    Assessment: Upon chart review, [facility] Team member will not proceed with patient outreach related to this episode of Transitional Care Management program due to reason below:    [facility] TCU: Patient discharged to TCU/ARU/LTACH and is established within [facility] Primary Care. Referral created for Primary Care-Care Coordination program.    Plan: Transitional Care Management episode addressed appropriately per reason noted above.      STEWART Montgomery  [phone]  [facility]     *Connected Care Resource Team does NOT follow patient ongoing. Referrals are identified based on internal discharge reports and the outreach is to ensure patient has an understanding of their discharge instructions.

## 2024-04-09 NOTE — PROGRESS NOTES
Clinic Care Coordination Contact  Care Coordination Transition Communication    Clinical Data: [facility] from 4/4/24 to 4/8/24 with diagnosis of Atrial fibrillation with slow ventricular response.     Assessment: Patient has transitioned to TCU/ARU for short term rehabilitation:    Facility Name: [facility]  Transition Communication:  Notified facility of Primary Care- Care Coordination support via Epic fax.    Plan: Care Coordinator will await notification from facility staff informing of patient's discharge plans/needs. Care Coordinator will review chart and outreach to facility staff every 4 weeks and as needed.     Jen Gramajo RN, BSN, CPHN, [facility] Ambulatory Care Management  [facility]  Phone: [phone]  Email: [email or web address]

## 2024-04-09 NOTE — TELEPHONE ENCOUNTER
[facility]     Provider: BATSHEVA Acevedo CNP    Facility: [facility]   Facility Type:  TCU    Caller: Shaw   Call Back Number: [phone]  Reason for call: INR  Diagnosis/Goal: A. Fib    Todays INR: 1.6  Last INR 4/8  1.7  5mg in the hospital (discharge orders have 2.5mg M and 3.75mg AOD)  4/7 2.16--4mg  4/6  2.91--2mg  4/5 3.01 --2mg     Discharge order for 2.5mg M and 3.75mg AOd    Heparin/Lovenox:  No  Currently on ABX?: Yes; please explain: Augmentin x 10 days  Other interacting medication:  None  Missed or refused doses: No    Verbal Order/Direction given by Provider: warfarin 5mg x1 today Next INR 4/10    Provider Giving Order:  Jessica Treviño MD    Verbal Order given to: Shaw Cali RN

## 2024-04-10 ENCOUNTER — TELEPHONE (OUTPATIENT)
Dept: INTERNAL MEDICINE | Facility: CLINIC | Age: 84
End: 2024-04-10

## 2024-04-10 ENCOUNTER — TELEPHONE (OUTPATIENT)
Dept: NURSING | Facility: CLINIC | Age: 84
End: 2024-04-10

## 2024-04-10 ENCOUNTER — TRANSITIONAL CARE UNIT VISIT (OUTPATIENT)
Dept: GERIATRICS | Facility: CLINIC | Age: 84
End: 2024-04-10
Payer: COMMERCIAL

## 2024-04-10 VITALS
OXYGEN SATURATION: 98 % | DIASTOLIC BLOOD PRESSURE: 71 MMHG | RESPIRATION RATE: 18 BRPM | HEIGHT: 68 IN | TEMPERATURE: 98 F | BODY MASS INDEX: 36.98 KG/M2 | HEART RATE: 61 BPM | SYSTOLIC BLOOD PRESSURE: 144 MMHG | WEIGHT: 244 LBS

## 2024-04-10 DIAGNOSIS — I11.0 HYPERTENSIVE HEART DISEASE WITH CHRONIC DIASTOLIC CONGESTIVE HEART FAILURE (H): ICD-10-CM

## 2024-04-10 DIAGNOSIS — E11.22 TYPE 2 DIABETES MELLITUS WITH STAGE 3A CHRONIC KIDNEY DISEASE, WITHOUT LONG-TERM CURRENT USE OF INSULIN (H): ICD-10-CM

## 2024-04-10 DIAGNOSIS — I50.32 HYPERTENSIVE HEART DISEASE WITH CHRONIC DIASTOLIC CONGESTIVE HEART FAILURE (H): ICD-10-CM

## 2024-04-10 DIAGNOSIS — I48.91 ATRIAL FIBRILLATION WITH SLOW VENTRICULAR RESPONSE (H): ICD-10-CM

## 2024-04-10 DIAGNOSIS — R53.81 PHYSICAL DECONDITIONING: ICD-10-CM

## 2024-04-10 DIAGNOSIS — F43.22 ADJUSTMENT DISORDER WITH ANXIOUS MOOD: ICD-10-CM

## 2024-04-10 DIAGNOSIS — R41.89 COGNITIVE IMPAIRMENT: ICD-10-CM

## 2024-04-10 DIAGNOSIS — K52.9 CHRONIC DIARRHEA: ICD-10-CM

## 2024-04-10 DIAGNOSIS — N18.31 TYPE 2 DIABETES MELLITUS WITH STAGE 3A CHRONIC KIDNEY DISEASE, WITHOUT LONG-TERM CURRENT USE OF INSULIN (H): ICD-10-CM

## 2024-04-10 DIAGNOSIS — K57.32 DIVERTICULITIS OF COLON: Primary | ICD-10-CM

## 2024-04-10 DIAGNOSIS — Z79.01 LONG TERM CURRENT USE OF ANTICOAGULANT THERAPY: ICD-10-CM

## 2024-04-10 PROBLEM — E66.01 MORBID (SEVERE) OBESITY DUE TO EXCESS CALORIES (H): Status: RESOLVED | Noted: 2023-06-26 | Resolved: 2024-04-10

## 2024-04-10 PROBLEM — T87.89: Status: RESOLVED | Noted: 2023-10-18 | Resolved: 2024-04-10

## 2024-04-10 PROBLEM — E66.01 MORBID OBESITY (H): Status: RESOLVED | Noted: 2018-08-29 | Resolved: 2024-04-10

## 2024-04-10 PROCEDURE — 99305 1ST NF CARE MODERATE MDM 35: CPT | Performed by: INTERNAL MEDICINE

## 2024-04-10 NOTE — TELEPHONE ENCOUNTER
"Patient calls, she is currently in TCU for rehab following pacemaker placement.     She has upcoming appointments on 5/1 with Hematology and Cardiology. Patient states that she is at her limit right now with her health and cannot add any more to her plate. She is wanting to cancel her appointment with Hematology and wanted Dr. Payan to be aware of this. Patient states that she doesn't think the Hematology appointment is necessary anymore anyway because the problem is no longer occurring. Patient states she was seeing them because the \"color of her blood\" was wrong and that was being caused by her being dehydrated. Patient informed that Hematology referral was placed because her platelets were low, which would not be affected by dehydration. Patient states she doesn't want to see Hematology without talking to Dr. Payan first. Patient has an appointment with Dr. Payan on 4/24. Encouraged her to speak to Dr. Payan about the referral before cancelling or rescheduling the appointment. Patient informed this writer is not canceling her appointment with Hematology. Patient agrees to discuss this with Dr. Payan at her upcoming appointment. Informed patient this writer will forward her message to Dr Payan so she is aware of her concerns. Patient states we can call her on her cell phone if we have any updates.     Mahsa Arteaga RN  [facility]    "

## 2024-04-10 NOTE — PROGRESS NOTES
"Savanna Rehman is a 83 year old female seen April 10, 2024 at [facility] TCU where she was admitted after [facility] hospitalization 4/4-8 for sigmoid diverticulitis with abscess    She presented with LLQ pain, N/V/D    CT showed acute sigmoid diverticulitis with adjacent abscess, not amenable to IR drainage   She was seen by Colorectal surgery and managed non operatively   Treated with IV Zosyn / po Augumentin and symptoms improved.     Had lightheadedness during her stay and found to have atrial fib with slow VR    HR to 30s    Pacemaker placed on 4/5     Improved and discharged to TCU for ongoing recovery and Rehab.       Today pt is seen in her room lying abed.    I've been battling diarrhea for 18 months   Has been treated for c diff  Then treated for UTI   Now for diverticulitis     HR in the 30s   Got a pacemaker  Had a \"good episode dizziness\" prior to that   Brings on a migraine  Vision goes gray black, thinks she might have passed out     By chart review, pt had a June 2023 [facility] hospitalization following a fall at home in her bathroom.   Pt was initially seen in the ED which was 5 days after the fall.   Noted to have a large right buttock hematoma, other imaging unremarkable.   She returned home, but then hospitalized 3 days later for right sided chest wall pain and tenderness.  Mobility impaired, improved over [place]CU course [place]       Past Medical History:   Diagnosis Date    Acute encephalopathy 09/21/2023    Anemia     Iron Deficiency anemia    Atrial fibrillation (H)     CAD (coronary artery disease)     non-obstructive    Chronic pain     neck, low back, legs    Congestive heart failure (H)     Degenerative disk disease     Diabetes (H)     Fibromyalgia     Gastro-oesophageal reflux disease     Gout     Hiatal hernia     Mumps     Neuropathy     CRISTIANA (obstructive sleep apnea) - CPAP     Palpitations     Pernicious anemia     Sleep apnea     uses " CPAP.    Urinary incontinence     Vitamin D deficiency        Past Surgical History:   Procedure Laterality Date    APPENDECTOMY      CHOLECYSTECTOMY      COLONOSCOPY  3/15/2011    COLONOSCOPY N/A 3/15/2023    Procedure: COLONOSCOPY, WITH POLYPECTOMY AND BIOPSY;  Surgeon: Maci Coronel MD;  Location:  GI    COLONOSCOPY N/A 3/15/2023    Procedure: COLONOSCOPY, FLEXIBLE, WITH LESION REMOVAL USING SNARE;  Surgeon: Maci Coronel MD;  Location:  GI    CORONARY ANGIOGRAPHY ADULT ORDER      CYSTOSCOPY, BIOPSY BLADDER, COMBINED N/A 2020    Procedure: CYSTOSCOPY, WITH BLADDER BIOPSY;  Surgeon: Deisy Wilson MD;  Location: [facility] OR    EP PACEMAKER DEVICE & LEAD IMPLANT- RIGHT ATRIAL & RIGHT VENTRICULAR Left 2024    Procedure: Pacemaker Device & Lead Implant - Right Atrial & Right Ventricular;  Surgeon: Raul Plunkett MD;  Location:  HEART CARDIAC CATH LAB    HEART CATH LEFT HEART CATH  16    medication management    HYSTERECTOMY TOTAL ABDOMINAL      Knee replacement NOS Left     LAPAROSCOPIC NISSEN FUNDOPLICATION N/A 2015    Procedure: LAPAROSCOPIC NISSEN FUNDOPLICATION;  Surgeon: Armando Ansari MD;  Location:  OR    TONSILLECTOMY      TRANSPOSITION ULNAR NERVE (ELBOW)         Family History   Problem Relation Age of Onset    Alzheimer Disease Mother     Lung Cancer Father     No Known Problems Brother     No Known Problems Brother     No Known Problems Brother     Unknown/Adopted No family hx of        Social History     Tobacco Use    Smoking status: Former     Current packs/day: 0.00     Average packs/day: 0.5 packs/day for 50.0 years (25.0 ttl pk-yrs)     Types: Cigarettes     Start date: 1964     Quit date: 2014     Years since quittin.6     Passive exposure: Past    Smokeless tobacco: Never   Substance Use Topics    Alcohol use: Yes     Comment: couple a month      SH:  Lives alone, [facility] [place] apartment in [place]  Moved here from [place] 8 years  "ago  Daughter Roya Gastelum  of an overdose this week     Review Of Systems  Skin: negative   Eyes: impaired vision, glasses  Ears/Nose/Throat: hearing loss  Respiratory: No shortness of breath, dyspnea on exertion, cough, or hemoptysis  Cardiovascular: as above   Gastrointestinal: FERREIRA, obesity  Genitourinary: incontinence  Musculoskeletal: frequent falls, ambulatory within her apartment but uses an electric scooter for distances     Currently independent with ADLs    Assist of one with transfers and bed mobility, ambulates with FWW and supervision. Cass 14  Neurologic: neuropathy, SLUMS   Psychiatric: anxiety  Hematologic/Lymphatic/Immunologic: anemia  Endocrine: DM2     GENERAL APPEARANCE: alert and no distress  BP (!) 144/71   Pulse 61   Temp 98  F (36.7  C)   Resp 18   Ht 1.727 m (5' 8\")   Wt 110.7 kg (244 lb)   LMP  (LMP Unknown)   SpO2 98%   BMI 37.10 kg/m     HEENT: normocephalic, no lesion or abnormalities  NECK: no adenopathy, no asymmetry, masses, or scars and thyroid normal to palpation  No drainage or erythema at pacemaker site   Does have bruising on right chest wall     RESP: lungs clear to auscultation - no rales, rhonchi or wheezes; good air movement   CV: irregular rate and rhythm, normal S1 S2, II/VI HSM  ABDOMEN: obese, soft, nontender, no HSM or masses and bowel sounds normal  MS: extremities with 1+ edema   Large bruise posterior left arn   SKIN: no suspicious lesions or rashes  NEURO: Normal strength and tone, sensory exam grossly normal, and speech normal  PSYCH: affect okay  LYMPHATICS: No cervical,  or supraclavicular nodes    Lab Results   Component Value Date     2024    POTASSIUM 4.0 2024    CHLORIDE 103 2024    CO2 27 2024    ANIONGAP 10 2024     (H) 2024    BUN 14.1 2024    CR 1.21 (H) 2024    GFRESTIMATED 45 (L) 2024    SAUL 9.0 2024     Lab Results   Component Value Date    AST 41 " 04/02/2024      ALBUMIN 3.4 04/02/2024      ALKPHOS 99 04/02/2024     Lab Results   Component Value Date    WBC 3.1 04/05/2024      HGB 11.9 04/05/2024      MCV 93 04/05/2024       04/05/2024       TSH   Date Value Ref Range Status   04/04/2024 2.98 0.30 - 4.20 uIU/mL Final   11/07/2022 0.76 0.40 - 4.00 mU/L Final   02/22/2021 1.37 0.40 - 4.00 mU/L Final      CT ABDOMEN PELVIS W CONTRAST   4/1/2024  LOWER CHEST: Mild to moderate coronary artery calcification.  HEPATOBILIARY: The gallbladder is surgically absent. Mild cirrhotic configuration of the liver. The portal veins are patent and the bile ducts are normal in caliber. There are secondary findings of portal venous hypertension which would include scattered varicosities and splenomegaly.  PANCREAS: Significant atrophy.  SPLEEN: Splenomegaly with the spleen measuring approximately 15 cm in greatest longitudinal dimension.  BOWEL: There is thickening seen involving the proximal and mid portions of the sigmoid colon with scattered diverticuli. The above findings would be worrisome for diverticulitis. Just lateral to the mid sigmoid colon, there is an abnormal fluid collection measuring approximately 2.5 x 1.3 x 2.9 cm and this is nonspecific, but could represent an abscess without associated gas. On CT 03/13/2022, there was a tiny abnormality at this same location; however, this only measured 1.2 x 0.6 x 1.2 cm. The appendix is not seen and is likely surgically absent or atrophic. Prior postoperative changes near the EG junction.  LYMPH NODES: Mildly prominent right groin lymph node measuring 1.6 x 1.1 cm and differential would include an abscess without associated gas or a seroma/hematoma. This is new since 03/13/2022.  MUSCULOSKELETAL: There is an abnormal well-circumscribed fluid collection with no associated gas seen involving the subcutaneous fat of the mid right buttocks region and this measures approximately 7.8 x 3.0 x 5.9 cm   IMPRESSION:   1.   Changes worrisome for sigmoid diverticulitis with possible adjacent 2.5 x 1.3 x 2.9 cm abscess.  2.  Nonspecific fluid collection in the deep subcutaneous fat over the right buttocks region measuring 7.8 x 3.0 x 5.9 cm and differential would include an abscess without associated gas or seroma/hematoma.  3.  Cirrhotic changes of the liver with associated secondary findings of portal venous hypertension which would include varicosities and splenomegaly. The portal veins are patent.  4.  Mildly prominent right groin lymph node, this may be reactive/inflammatory in nature    ECHO 4/4/2024   Left ventricular systolic function is normal.The visual ejection fraction is 55-60%.  The right ventricular systolic function is normal.   The left atrium is moderately dilated.  There is mild (1+) mitral regurgitation.There is mild to moderate (1-2+) tricuspid regurgitation.There is mild (1+) aortic regurgitation.  Dilation of the inferior vena cava is present with abnormal respiratory variation in diameter.  Compared to prior study dated 02/23/2024 no significant changes      IMP/PLAN:   (K57.32) Diverticulitis of colon  (primary encounter diagnosis)  Comment: with abscess     Plan: Augmentin 875 mg bid for 10 days     Monitor for recurrent symptoms     (I48.91) Atrial fibrillation with slow ventricular response (H)  Comment: s/p pacemaker placement  She is not on rate-slowing medications   Warfarin for stroke prophylaxis:   today's INR 1.4     Lab Results   Component Value Date    INR 1.70 04/08/2024    INR 2.16 04/07/2024    INR 2.91 04/06/2024   Will give warfarin 7.5 mg /day for 2 days, recheck INR on Friday.      (S30.0XXD) Contusion of lower back and pelvis, subsequent encounter    Comment: injuries from fall in June 2023, still partially there as above    Plan: Pain management with local measures, prn acetaminophen     Pt states she may have surgery     (R53.81) Physical deconditioning  (R29.6) Repeated falls  (R42)  Dizziness and giddiness  Comment: falls likely multifactorial with peripheral neuropathy, dizziness, deconditioning  Plan: PHYSICAL THERAPY / OCCUPATIONAL THERAPY for transfers, balance, gait, strengthening  [place]   [place] to go to [place], but she states she's not ready      (E11.22,  N18.31) Type 2 diabetes mellitus with stage 3a chronic kidney disease, without long-term current use of insulin (H)  Comment:   Lab Results   Component Value Date    A1C 6.1 02/08/2024     Plan: glipizide 2.5 mg bid   BGM prn     (G47.33) Obstructive sleep apnea (adult) (pediatric)  Comment: has her CPAP here   Plan: home settings     (I11.0,  I50.32) Hypertensive heart disease with chronic diastolic congestive heart failure (H)  Comment:   BP Readings from Last 3 Encounters:   04/10/24 (!) 144/71   04/08/24 (!) 146/67   04/04/24 129/44      Plan: indapamide 1.25 mg/day and furosemide 20 mg/day   Follow bps and BMP     (K52.9) Chronic diarrhea  Comment: longstanding  Plan: takes PRN loperamide 2 mg     (R41.89) Cognitive impairment  Comment: mildly repetitive, mixes up time but can report her medical issues   Plan: Formal cognitive testing to help determine safe discharge plan     (F43.22) Adjustment disorder with anxious mood  Comment: may be related to above   Plan: escitalopram     Advance directive: remains full code.   Reviewed with pt today.        Jessica Treviño MD

## 2024-04-10 NOTE — LETTER
"    4/10/2024        RE: Savanna Rehman  [address]  [facility] [address]        Savanna Rehman is a 83 year old female seen April 10, 2024 at [facility] TCU where she was admitted after [facility] hospitalization 4/4-8 for sigmoid diverticulitis with abscess    She presented with LLQ pain, N/V/D    CT showed acute sigmoid diverticulitis with adjacent abscess, not amenable to IR drainage   She was seen by Colorectal surgery and managed non operatively   Treated with IV Zosyn / po Augumentin and symptoms improved.     Had lightheadedness during her stay and found to have atrial fib with slow VR    HR to 30s    Pacemaker placed on 4/5     Improved and discharged to TCU for ongoing recovery and Rehab.       Today pt is seen in her room lying abed.    I've been battling diarrhea for 18 months   Has been treated for c diff  Then treated for UTI   Now for diverticulitis     HR in the 30s   Got a pacemaker  Had a \"good episode dizziness\" prior to that   Brings on a migraine  Vision goes gray black, thinks she might have passed out     By chart review, pt had a June 2023 [facility] hospitalization following a fall at home in her bathroom.   Pt was initially seen in the ED which was 5 days after the fall.   Noted to have a large right buttock hematoma, other imaging unremarkable.   She returned home, but then hospitalized 3 days later for right sided chest wall pain and tenderness.  Mobility impaired, improved over [place]CU course [place]       Past Medical History:   Diagnosis Date     Acute encephalopathy 09/21/2023     Anemia     Iron Deficiency anemia     Atrial fibrillation (H)      CAD (coronary artery disease)     non-obstructive     Chronic pain     neck, low back, legs     Congestive heart failure (H)      Degenerative disk disease      Diabetes (H)      Fibromyalgia      Gastro-oesophageal reflux disease      Gout      Hiatal hernia      Mumps      " Neuropathy      CRISTIANA (obstructive sleep apnea) - CPAP      Palpitations      Pernicious anemia      Sleep apnea     uses CPAP.     Urinary incontinence      Vitamin D deficiency        Past Surgical History:   Procedure Laterality Date     APPENDECTOMY       CHOLECYSTECTOMY       COLONOSCOPY  3/15/2011     COLONOSCOPY N/A 3/15/2023    Procedure: COLONOSCOPY, WITH POLYPECTOMY AND BIOPSY;  Surgeon: Maci Coronel MD;  Location:  GI     COLONOSCOPY N/A 3/15/2023    Procedure: COLONOSCOPY, FLEXIBLE, WITH LESION REMOVAL USING SNARE;  Surgeon: Maci Coronel MD;  Location:  GI     CORONARY ANGIOGRAPHY ADULT ORDER       CYSTOSCOPY, BIOPSY BLADDER, COMBINED N/A 2020    Procedure: CYSTOSCOPY, WITH BLADDER BIOPSY;  Surgeon: Deisy Wilson MD;  Location: [facility] OR     EP PACEMAKER DEVICE & LEAD IMPLANT- RIGHT ATRIAL & RIGHT VENTRICULAR Left 2024    Procedure: Pacemaker Device & Lead Implant - Right Atrial & Right Ventricular;  Surgeon: Raul Plunkett MD;  Location:  HEART CARDIAC CATH LAB     HEART CATH LEFT HEART CATH  16    medication management     HYSTERECTOMY TOTAL ABDOMINAL       Knee replacement NOS Left      LAPAROSCOPIC NISSEN FUNDOPLICATION N/A 2015    Procedure: LAPAROSCOPIC NISSEN FUNDOPLICATION;  Surgeon: Armando Ansari MD;  Location:  OR     TONSILLECTOMY       TRANSPOSITION ULNAR NERVE (ELBOW)         Family History   Problem Relation Age of Onset     Alzheimer Disease Mother      Lung Cancer Father      No Known Problems Brother      No Known Problems Brother      No Known Problems Brother      Unknown/Adopted No family hx of        Social History     Tobacco Use     Smoking status: Former     Current packs/day: 0.00     Average packs/day: 0.5 packs/day for 50.0 years (25.0 ttl pk-yrs)     Types: Cigarettes     Start date: 1964     Quit date: 2014     Years since quittin.6     Passive exposure: Past     Smokeless tobacco: Never   Substance Use Topics      "Alcohol use: Yes     Comment: couple a month      SH:  Lives alone, [facility] Senior apartment in [place]  Moved here from [place] 8 years ago  Daughter Roya   Nephew  of an overdose this week     Review Of Systems  Skin: negative   Eyes: impaired vision, glasses  Ears/Nose/Throat: hearing loss  Respiratory: No shortness of breath, dyspnea on exertion, cough, or hemoptysis  Cardiovascular: as above   Gastrointestinal: FERREIRA, obesity  Genitourinary: incontinence  Musculoskeletal: frequent falls, ambulatory within her apartment but uses an electric scooter for distances     Currently independent with ADLs    Assist of one with transfers and bed mobility, ambulates with FWW and supervision. Cass 14  Neurologic: neuropathy, SLUMS   Psychiatric: anxiety  Hematologic/Lymphatic/Immunologic: anemia  Endocrine: DM2     GENERAL APPEARANCE: alert and no distress  BP (!) 144/71   Pulse 61   Temp 98  F (36.7  C)   Resp 18   Ht 1.727 m (5' 8\")   Wt 110.7 kg (244 lb)   LMP  (LMP Unknown)   SpO2 98%   BMI 37.10 kg/m     HEENT: normocephalic, no lesion or abnormalities  NECK: no adenopathy, no asymmetry, masses, or scars and thyroid normal to palpation  No drainage or erythema at pacemaker site   Does have bruising on right chest wall     RESP: lungs clear to auscultation - no rales, rhonchi or wheezes; good air movement   CV: irregular rate and rhythm, normal S1 S2, II/VI HSM  ABDOMEN: obese, soft, nontender, no HSM or masses and bowel sounds normal  MS: extremities with 1+ edema   Large bruise posterior left arn   SKIN: no suspicious lesions or rashes  NEURO: Normal strength and tone, sensory exam grossly normal, and speech normal  PSYCH: affect okay  LYMPHATICS: No cervical,  or supraclavicular nodes    Lab Results   Component Value Date     2024    POTASSIUM 4.0 2024    CHLORIDE 103 2024    CO2 27 2024    ANIONGAP 10 2024     (H) 2024    BUN 14.1 " 04/05/2024    CR 1.21 (H) 04/05/2024    GFRESTIMATED 45 (L) 04/05/2024    SAUL 9.0 04/05/2024     Lab Results   Component Value Date    AST 41 04/02/2024      ALBUMIN 3.4 04/02/2024      ALKPHOS 99 04/02/2024     Lab Results   Component Value Date    WBC 3.1 04/05/2024      HGB 11.9 04/05/2024      MCV 93 04/05/2024       04/05/2024       TSH   Date Value Ref Range Status   04/04/2024 2.98 0.30 - 4.20 uIU/mL Final   11/07/2022 0.76 0.40 - 4.00 mU/L Final   02/22/2021 1.37 0.40 - 4.00 mU/L Final      CT ABDOMEN PELVIS W CONTRAST   4/1/2024  LOWER CHEST: Mild to moderate coronary artery calcification.  HEPATOBILIARY: The gallbladder is surgically absent. Mild cirrhotic configuration of the liver. The portal veins are patent and the bile ducts are normal in caliber. There are secondary findings of portal venous hypertension which would include scattered varicosities and splenomegaly.  PANCREAS: Significant atrophy.  SPLEEN: Splenomegaly with the spleen measuring approximately 15 cm in greatest longitudinal dimension.  BOWEL: There is thickening seen involving the proximal and mid portions of the sigmoid colon with scattered diverticuli. The above findings would be worrisome for diverticulitis. Just lateral to the mid sigmoid colon, there is an abnormal fluid collection measuring approximately 2.5 x 1.3 x 2.9 cm and this is nonspecific, but could represent an abscess without associated gas. On CT 03/13/2022, there was a tiny abnormality at this same location; however, this only measured 1.2 x 0.6 x 1.2 cm. The appendix is not seen and is likely surgically absent or atrophic. Prior postoperative changes near the EG junction.  LYMPH NODES: Mildly prominent right groin lymph node measuring 1.6 x 1.1 cm and differential would include an abscess without associated gas or a seroma/hematoma. This is new since 03/13/2022.  MUSCULOSKELETAL: There is an abnormal well-circumscribed fluid collection with no associated gas  seen involving the subcutaneous fat of the mid right buttocks region and this measures approximately 7.8 x 3.0 x 5.9 cm   IMPRESSION:   1.  Changes worrisome for sigmoid diverticulitis with possible adjacent 2.5 x 1.3 x 2.9 cm abscess.  2.  Nonspecific fluid collection in the deep subcutaneous fat over the right buttocks region measuring 7.8 x 3.0 x 5.9 cm and differential would include an abscess without associated gas or seroma/hematoma.  3.  Cirrhotic changes of the liver with associated secondary findings of portal venous hypertension which would include varicosities and splenomegaly. The portal veins are patent.  4.  Mildly prominent right groin lymph node, this may be reactive/inflammatory in nature    ECHO 4/4/2024   Left ventricular systolic function is normal.The visual ejection fraction is 55-60%.  The right ventricular systolic function is normal.   The left atrium is moderately dilated.  There is mild (1+) mitral regurgitation.There is mild to moderate (1-2+) tricuspid regurgitation.There is mild (1+) aortic regurgitation.  Dilation of the inferior vena cava is present with abnormal respiratory variation in diameter.  Compared to prior study dated 02/23/2024 no significant changes      IMP/PLAN:   (K57.32) Diverticulitis of colon  (primary encounter diagnosis)  Comment: with abscess     Plan: Augmentin 875 mg bid for 10 days     Monitor for recurrent symptoms     (I48.91) Atrial fibrillation with slow ventricular response (H)  Comment: s/p pacemaker placement  She is not on rate-slowing medications   Warfarin for stroke prophylaxis:   today's INR 1.4     Lab Results   Component Value Date    INR 1.70 04/08/2024    INR 2.16 04/07/2024    INR 2.91 04/06/2024   Will give warfarin 7.5 mg /day for 2 days, recheck INR on Friday.      (S30.0XXD) Contusion of lower back and pelvis, subsequent encounter    Comment: injuries from fall in June 2023, still partially there as above    Plan: Pain management with local  measures, prn acetaminophen     Pt states she may have surgery     (R53.81) Physical deconditioning  (R29.6) Repeated falls  (R42) Dizziness and giddiness  Comment: falls likely multifactorial with peripheral neuropathy, dizziness, deconditioning  Plan: PHYSICAL THERAPY / OCCUPATIONAL THERAPY for transfers, balance, gait, strengthening  [place]   D[place] to go to [place], but she states she's not ready      (E11.22,  N18.31) Type 2 diabetes mellitus with stage 3a chronic kidney disease, without long-term current use of insulin (H)  Comment:   Lab Results   Component Value Date    A1C 6.1 02/08/2024     Plan: glipizide 2.5 mg bid   BGM prn     (G47.33) Obstructive sleep apnea (adult) (pediatric)  Comment: has her CPAP here   Plan: home settings     (I11.0,  I50.32) Hypertensive heart disease with chronic diastolic congestive heart failure (H)  Comment:   BP Readings from Last 3 Encounters:   04/10/24 (!) 144/71   04/08/24 (!) 146/67   04/04/24 129/44      Plan: indapamide 1.25 mg/day and furosemide 20 mg/day   Follow bps and BMP     (K52.9) Chronic diarrhea  Comment: longstanding  Plan: takes PRN loperamide 2 mg     (R41.89) Cognitive impairment  Comment: mildly repetitive, mixes up time but can report her medical issues   Plan: Formal cognitive testing to help determine safe discharge plan     (F43.22) Adjustment disorder with anxious mood  Comment: may be related to above   Plan: escitalopram     Advance directive: remains full code.   Reviewed with pt today.        Jessica Treviño MD       Sincerely,        Jessica Treviño MD

## 2024-04-11 NOTE — TELEPHONE ENCOUNTER
Nurse Triage SBAR    Is this a 2nd Level Triage? No  Situation: Patient calling, reports some ongoing confusion regarding her labs. Reports having some discoloration to her lower legs and is wondering if this is related to her labs. Patient is very con    Background: Patient in a TCU working with Dr. Treviño but see's Dr. Payan. Patient reports having some discoloration to her lower legs and is wondering if this is related to her labs. Patient is very concerned that with her platelet count the way it is she will have an incident and just die. Patient denies any new symptoms but wants more clarification from her care team regarding what her labs mean and what she should be doing.     Assessment: Patient concerned with labs, needs provider input and clarification.     Protocol Recommended Disposition:   No disposition on file.    Recommendation: Provider input and call to patient with next steps.      Routed to provider    Does the patient meet one of the following criteria for ADS visit consideration? 16+ years old, with an [facility] PCP     TIP  Providers, please consider if this condition is appropriate for management at one of our Acute and Diagnostic Services sites.     If patient is a good candidate, please use dotphrase <dot>triageresponse and select Refer to ADS to document.

## 2024-04-11 NOTE — PROGRESS NOTES
[place] [facility]  [place] [facility] Record Number:  [id number]  Place of Service where encounter took place:  [facility] ([facility]) [[id number]]  Chief Complaint   Patient presents with    Nursing Home Acute       HPI:    Savanna Rehman  is a 83 year old (1940), who is being seen today for an episodic care visit.  HPI information obtained from: facility chart records, facility staff, patient report, and [facility] chart review. Today's concern is: feels is stronger, edema is better. Some soreness at PPM site     Diverticulitis of large intestine with abscess, unspecified bleeding status  Thrombocytopenia (H24)  Abnormal WBC count  Atrial fibrillation with slow ventricular response (H)  S/P placement of cardiac pacemaker  Anticoagulated on Coumadin  Hypertensive heart disease with chronic diastolic congestive heart failure (H)  Type 2 diabetes mellitus with stage 3a chronic kidney disease, without long-term current use of insulin (H)  Physical deconditioning  Repeated falls  Cognitive impairment  Adjustment disorder with anxious mood  Chronic diarrhea  Urinary incontinence, unspecified type  Obstructive sleep apnea (adult) (pediatric)     Past Medical and Surgical History reviewed in Epic today.    MEDICATIONS:     Current Outpatient Medications   Medication Sig Dispense Refill    Warfarin Sodium (COUMADIN PO) Take by mouth daily 4/12/24: INR 1.6 today  Take coumadin 7.5 mg on 4/12 and 4/13 and then 5mg on 4/14 and 4/15 then check INR on 4/16      acetaminophen (TYLENOL) 500 MG tablet Take 1,000 mg by mouth every 6 hours as needed for mild pain      amoxicillin-clavulanate (AUGMENTIN) 875-125 MG tablet Take 1 tablet by mouth 2 times daily for 10 days (Patient taking differently: Take 1 tablet by mouth 2 times daily Done on 4/18/2024)      Cholecalciferol (VITAMIN D-3) 125 MCG (5000 UT) TABS Take 5,000 Units by mouth daily      escitalopram (LEXAPRO) 5 MG tablet Take 1  "tablet by mouth once daily 30 tablet 0    famotidine (PEPCID) 20 MG tablet Take 20 mg by mouth 2 times daily      furosemide (LASIX) 20 MG tablet Take 1 tablet (20 mg) by mouth daily 30 tablet 1    glipiZIDE (GLUCOTROL XL) 2.5 MG 24 hr tablet Take 1 tablet by mouth twice daily 180 tablet 0    indapamide (LOZOL) 2.5 MG tablet Take 1 tablet (2.5 mg) by mouth every morning 90 tablet 1    loperamide (IMODIUM) 2 MG capsule TAKE 1 CAPSULE BY MOUTH 4 TIMES DAILY AS NEEDED FOR DIARRHEA 60 capsule 0    warfarin ANTICOAGULANT (COUMADIN) 2.5 MG tablet Take 3.75 mg by mouth six times a week Daily except on Mondays (Patient not taking: Reported on 4/12/2024)      warfarin ANTICOAGULANT (COUMADIN) 2.5 MG tablet Take 2.5 mg by mouth once a week On Monday (Patient not taking: Reported on 4/12/2024)          TODAY DURING EXAM/ROS:  No CP, SOB, Cough, dizziness, fevers, chills, HA, N/V, dysuria or Bowel Abnormalities but has urine incontinent at baseline and loose BM--not new.. Appetite is good.  No pain except as noted above.      Objective:  /74   Pulse 74   Temp 97.6  F (36.4  C)   Resp 18   Ht 1.727 m (5' 8\")   Wt 108.4 kg (239 lb)   LMP  (LMP Unknown)   SpO2 94%   BMI 36.34 kg/m    Exam:  GENERAL APPEARANCE:  Alert, in no distress, oriented, morbidly obese, cooperative  ENT:  Mouth and posterior oropharynx normal, moist mucous membranes, normal hearing acuity  EYES:  Conjunctiva and lids normal  RESP:  respiratory effort  of chest normal, lungs clear to auscultation ,NAD    CV: Auscultation of heart done , irregular HR, no murmur, . Has ppm drsg left upper chest--D&I, has <+1 bilat pedal/ankle edema Lt > Rt, + pedal pulses  ABDOMEN:  normal bowel sounds, soft, nontender, obese  M/S:   AGUILAR equally, up in chair.  SKIN:  Inspection of skin at baseline,mild soreness Left lower abd  NEURO:   Cranial nerves 2-12 are normal tested and grossly at patient's baseline  PSYCH:  oriented X 3, normal insight, judgement and " memory      Labs:   Recent Labs   Lab Test 04/08/24  0537 04/07/24  0529 04/05/24  0937 04/04/24  0659   NA  --   --  140 138   POTASSIUM 4.0 4.1 3.8 4.1   CHLORIDE  --   --  103 100   CO2  --   --  27 26   ANIONGAP  --   --  10 12   GLC  --   --  110* 102*   BUN  --   --  14.1 18.4   CR  --   --  1.21* 1.65*   SAUL  --   --  9.0 8.5*    < > = values in this interval not displayed.     CBC RESULTS:   Recent Labs   Lab Test 04/05/24  0937   WBC 3.1*   RBC 3.85   HGB 11.9   HCT 35.8   MCV 93   MCH 30.9   MCHC 33.2   RDW 14.1   *     ASSESSMENT / PLAN:  (K57.20) Diverticulitis of large intestine with abscess, unspecified bleeding status  (primary encounter diagnosis)  (D69.6) Thrombocytopenia (H24)  (D72.9) Abnormal WBC count  Comment: recheck CBC, plts on 4/15==Abx done on 4/18, improving, monitor(*of note has hx cirrhosis also*)    (I48.91) Atrial fibrillation with slow ventricular response (H)  (Z95.0) S/P placement of cardiac pacemaker: 4/5/2024  (Z79.01) Anticoagulated on Coumadin  (I11.0,  I50.32) Hypertensive heart disease with chronic diastolic congestive heart failure (H)  Comment: wt down from 244# on admit to 238.2#, mild edema,--add Tubigrips. INR today 1.6 so coumadin as noted above, check INR on 4/16.    (E11.22,  N18.31) Type 2 diabetes mellitus with stage 3a chronic kidney disease, without long-term current use of insulin (H)  Comment: accuchecks in mid to low 100's , controlled , is on glipizide, , check BMP on 4/15    (R53.81) Physical deconditioning  (R29.6) Repeated falls  Comment: PT OT    (R41.89) Cognitive impairment  (F43.22) Adjustment disorder with anxious mood  Comment: OT Cog eval pending, cont Lexapro, monitor    (K52.9) Chronic diarrhea  (R32) Urinary incontinence, unspecified type  Comment: prn imodium, uses depends, chronic problem, watch skin integrity.    (G47.33) Obstructive sleep apnea (adult) (pediatric)  Comment: does not wear the CPAP--though knows is  rec'd.    Electronically signed by:  BATSHEVA Pelletier CNP

## 2024-04-12 ENCOUNTER — TRANSITIONAL CARE UNIT VISIT (OUTPATIENT)
Dept: GERIATRICS | Facility: CLINIC | Age: 84
End: 2024-04-12
Payer: COMMERCIAL

## 2024-04-12 ENCOUNTER — TRANSFERRED RECORDS (OUTPATIENT)
Dept: HEALTH INFORMATION MANAGEMENT | Facility: CLINIC | Age: 84
End: 2024-04-12

## 2024-04-12 ENCOUNTER — TELEPHONE (OUTPATIENT)
Dept: INTERNAL MEDICINE | Facility: CLINIC | Age: 84
End: 2024-04-12

## 2024-04-12 VITALS
DIASTOLIC BLOOD PRESSURE: 74 MMHG | RESPIRATION RATE: 18 BRPM | TEMPERATURE: 97.6 F | OXYGEN SATURATION: 94 % | SYSTOLIC BLOOD PRESSURE: 133 MMHG | HEART RATE: 74 BPM | BODY MASS INDEX: 36.22 KG/M2 | HEIGHT: 68 IN | WEIGHT: 239 LBS

## 2024-04-12 DIAGNOSIS — Z95.0 S/P PLACEMENT OF CARDIAC PACEMAKER: ICD-10-CM

## 2024-04-12 DIAGNOSIS — I50.32 HYPERTENSIVE HEART DISEASE WITH CHRONIC DIASTOLIC CONGESTIVE HEART FAILURE (H): ICD-10-CM

## 2024-04-12 DIAGNOSIS — D69.6 THROMBOCYTOPENIA (H): ICD-10-CM

## 2024-04-12 DIAGNOSIS — F43.22 ADJUSTMENT DISORDER WITH ANXIOUS MOOD: ICD-10-CM

## 2024-04-12 DIAGNOSIS — I48.91 ATRIAL FIBRILLATION WITH SLOW VENTRICULAR RESPONSE (H): ICD-10-CM

## 2024-04-12 DIAGNOSIS — K57.20 DIVERTICULITIS OF LARGE INTESTINE WITH ABSCESS, UNSPECIFIED BLEEDING STATUS: Primary | ICD-10-CM

## 2024-04-12 DIAGNOSIS — E11.22 TYPE 2 DIABETES MELLITUS WITH STAGE 3A CHRONIC KIDNEY DISEASE, WITHOUT LONG-TERM CURRENT USE OF INSULIN (H): ICD-10-CM

## 2024-04-12 DIAGNOSIS — R32 URINARY INCONTINENCE, UNSPECIFIED TYPE: ICD-10-CM

## 2024-04-12 DIAGNOSIS — Z79.01 ANTICOAGULATED ON COUMADIN: ICD-10-CM

## 2024-04-12 DIAGNOSIS — R41.89 COGNITIVE IMPAIRMENT: ICD-10-CM

## 2024-04-12 DIAGNOSIS — I11.0 HYPERTENSIVE HEART DISEASE WITH CHRONIC DIASTOLIC CONGESTIVE HEART FAILURE (H): ICD-10-CM

## 2024-04-12 DIAGNOSIS — D72.9 ABNORMAL WBC COUNT: ICD-10-CM

## 2024-04-12 DIAGNOSIS — R53.81 PHYSICAL DECONDITIONING: ICD-10-CM

## 2024-04-12 DIAGNOSIS — G47.33 OBSTRUCTIVE SLEEP APNEA (ADULT) (PEDIATRIC): ICD-10-CM

## 2024-04-12 DIAGNOSIS — R29.6 REPEATED FALLS: ICD-10-CM

## 2024-04-12 DIAGNOSIS — N18.31 TYPE 2 DIABETES MELLITUS WITH STAGE 3A CHRONIC KIDNEY DISEASE, WITHOUT LONG-TERM CURRENT USE OF INSULIN (H): ICD-10-CM

## 2024-04-12 DIAGNOSIS — K52.9 CHRONIC DIARRHEA: ICD-10-CM

## 2024-04-12 PROCEDURE — 99309 SBSQ NF CARE MODERATE MDM 30: CPT | Performed by: NURSE PRACTITIONER

## 2024-04-12 NOTE — TELEPHONE ENCOUNTER
Fax received from [facility] - Discharge Summary 04/12/24 for review and signature.  Put in Dr. Payan's in basket.

## 2024-04-12 NOTE — LETTER
4/12/2024        RE: Savanna Rehman  [address]  [place] [address]        [place] [facility]  [place] [facility] Record Number:  [id number]  Place of Service where encounter took place:  No question data found.  No chief complaint on file.      HPI:    Savanna Rehman  is a 83 year old (1940), who is being seen today for an episodic care visit.  HPI information obtained from: {FGS HPI:938542}. Today's concern is:  {FGS DX:827607}    Past Medical and Surgical History reviewed in Epic today.    MEDICATIONS:  {Providers Please double check the med list (in the plan section >> meds & orders tab) and Discontinue any of the meds flagged by the TC to be discontinued}  Current Outpatient Medications   Medication Sig Dispense Refill    acetaminophen (TYLENOL) 500 MG tablet Take 1,000 mg by mouth every 6 hours as needed for mild pain      amoxicillin-clavulanate (AUGMENTIN) 875-125 MG tablet Take 1 tablet by mouth 2 times daily for 10 days      Cholecalciferol (VITAMIN D-3) 125 MCG (5000 UT) TABS Take 5,000 Units by mouth daily      escitalopram (LEXAPRO) 5 MG tablet Take 1 tablet by mouth once daily 30 tablet 0    famotidine (PEPCID) 20 MG tablet Take 20 mg by mouth 2 times daily      furosemide (LASIX) 20 MG tablet Take 1 tablet (20 mg) by mouth daily 30 tablet 1    glipiZIDE (GLUCOTROL XL) 2.5 MG 24 hr tablet Take 1 tablet by mouth twice daily 180 tablet 0    indapamide (LOZOL) 2.5 MG tablet Take 1 tablet (2.5 mg) by mouth every morning 90 tablet 1    loperamide (IMODIUM) 2 MG capsule TAKE 1 CAPSULE BY MOUTH 4 TIMES DAILY AS NEEDED FOR DIARRHEA 60 capsule 0    warfarin ANTICOAGULANT (COUMADIN) 2.5 MG tablet Take 3.75 mg by mouth six times a week Daily except on Mondays      warfarin ANTICOAGULANT (COUMADIN) 2.5 MG tablet Take 2.5 mg by mouth once a week On Monday       ***    REVIEW OF SYSTEMS:  {ROS FGS:721664}    Objective:  LMP  (LMP Unknown)   Exam:  {Nursing home physical  exam :166838}    Labs:   {fgslab:397133}    ASSESSMENT/PLAN:  {FGS DX:815544}    {fgsorders:309897}  ***    {fgstime1:414045}  Electronically signed by:  Angelic Campos MA ***  {Providers Please double check the med list (in the plan section >> meds & orders tab) and Discontinue any of the meds flagged by the TC to be discontinued}         [facility]  [place] [facility] Record Number:  [id number]  Place of Service where encounter took place:  [facility] ([facility]) [[id number]]  Chief Complaint   Patient presents with    Nursing Home Acute       HPI:    Savanna Rehman  is a 83 year old (1940), who is being seen today for an episodic care visit.  HPI information obtained from: {FGS HPI:419900}. Today's concern is:  {FGS DX:222305}    Past Medical and Surgical History reviewed in Epic today.    MEDICATIONS:  {Providers Please double check the med list (in the plan section >> meds & orders tab) and Discontinue any of the meds flagged by the TC to be discontinued}  Current Outpatient Medications   Medication Sig Dispense Refill    acetaminophen (TYLENOL) 500 MG tablet Take 1,000 mg by mouth every 6 hours as needed for mild pain      amoxicillin-clavulanate (AUGMENTIN) 875-125 MG tablet Take 1 tablet by mouth 2 times daily for 10 days      Cholecalciferol (VITAMIN D-3) 125 MCG (5000 UT) TABS Take 5,000 Units by mouth daily      escitalopram (LEXAPRO) 5 MG tablet Take 1 tablet by mouth once daily 30 tablet 0    famotidine (PEPCID) 20 MG tablet Take 20 mg by mouth 2 times daily      furosemide (LASIX) 20 MG tablet Take 1 tablet (20 mg) by mouth daily 30 tablet 1    glipiZIDE (GLUCOTROL XL) 2.5 MG 24 hr tablet Take 1 tablet by mouth twice daily 180 tablet 0    indapamide (LOZOL) 2.5 MG tablet Take 1 tablet (2.5 mg) by mouth every morning 90 tablet 1    loperamide (IMODIUM) 2 MG capsule TAKE 1 CAPSULE BY MOUTH 4 TIMES DAILY AS NEEDED FOR DIARRHEA 60 capsule 0    warfarin  "ANTICOAGULANT (COUMADIN) 2.5 MG tablet Take 3.75 mg by mouth six times a week Daily except on Mondays      warfarin ANTICOAGULANT (COUMADIN) 2.5 MG tablet Take 2.5 mg by mouth once a week On Monday       ***    REVIEW OF SYSTEMS:  {ROS FGS:112723}    Objective:  /74   Pulse 74   Temp 97.6  F (36.4  C)   Resp 18   Ht 1.727 m (5' 8\")   Wt 108.4 kg (239 lb)   LMP  (LMP Unknown)   SpO2 94%   BMI 36.34 kg/m    Exam:  {Nursing home physical exam :152425}    Labs:   {fgslab:151638}    ASSESSMENT/PLAN:  {FGS DX:870240}    {fgsorders:345958}  ***    {fgstime1:243692}  Electronically signed by:  Angelic Campos MA ***  {Providers Please double check the med list (in the plan section >> meds & orders tab) and Discontinue any of the meds flagged by the TC to be discontinued}            Sincerely,        BATSHEVA Pelletier CNP      "

## 2024-04-13 ENCOUNTER — LAB REQUISITION (OUTPATIENT)
Dept: LAB | Facility: CLINIC | Age: 84
End: 2024-04-13
Payer: COMMERCIAL

## 2024-04-13 DIAGNOSIS — N18.9 CHRONIC KIDNEY DISEASE, UNSPECIFIED: ICD-10-CM

## 2024-04-13 DIAGNOSIS — I10 ESSENTIAL (PRIMARY) HYPERTENSION: ICD-10-CM

## 2024-04-13 DIAGNOSIS — D64.9 ANEMIA, UNSPECIFIED: ICD-10-CM

## 2024-04-13 DIAGNOSIS — D72.829 ELEVATED WHITE BLOOD CELL COUNT, UNSPECIFIED: ICD-10-CM

## 2024-04-14 ENCOUNTER — TELEPHONE (OUTPATIENT)
Dept: GERIATRICS | Facility: CLINIC | Age: 84
End: 2024-04-14
Payer: COMMERCIAL

## 2024-04-14 NOTE — CONFIDENTIAL NOTE
Savanna Rehman is a 83 year old  (1940), Nurse called today to report: reports some type of allergic reaction, possibly food related, no new medications given    Give benadryl 25mg q6h PRN     Electronically signed by:   Alex [place] NP

## 2024-04-15 ENCOUNTER — TRANSITIONAL CARE UNIT VISIT (OUTPATIENT)
Dept: GERIATRICS | Facility: CLINIC | Age: 84
End: 2024-04-15
Payer: COMMERCIAL

## 2024-04-15 VITALS
DIASTOLIC BLOOD PRESSURE: 71 MMHG | BODY MASS INDEX: 36.07 KG/M2 | TEMPERATURE: 96 F | SYSTOLIC BLOOD PRESSURE: 149 MMHG | OXYGEN SATURATION: 95 % | RESPIRATION RATE: 16 BRPM | WEIGHT: 238 LBS | HEIGHT: 68 IN | HEART RATE: 70 BPM

## 2024-04-15 DIAGNOSIS — I50.32 HYPERTENSIVE HEART DISEASE WITH CHRONIC DIASTOLIC CONGESTIVE HEART FAILURE (H): ICD-10-CM

## 2024-04-15 DIAGNOSIS — Z79.01 ANTICOAGULATED ON COUMADIN: ICD-10-CM

## 2024-04-15 DIAGNOSIS — Z95.0 S/P PLACEMENT OF CARDIAC PACEMAKER: ICD-10-CM

## 2024-04-15 DIAGNOSIS — E11.22 TYPE 2 DIABETES MELLITUS WITH STAGE 3A CHRONIC KIDNEY DISEASE, WITHOUT LONG-TERM CURRENT USE OF INSULIN (H): ICD-10-CM

## 2024-04-15 DIAGNOSIS — I11.0 HYPERTENSIVE HEART DISEASE WITH CHRONIC DIASTOLIC CONGESTIVE HEART FAILURE (H): ICD-10-CM

## 2024-04-15 DIAGNOSIS — N18.31 TYPE 2 DIABETES MELLITUS WITH STAGE 3A CHRONIC KIDNEY DISEASE, WITHOUT LONG-TERM CURRENT USE OF INSULIN (H): ICD-10-CM

## 2024-04-15 DIAGNOSIS — D69.6 THROMBOCYTOPENIA (H): ICD-10-CM

## 2024-04-15 DIAGNOSIS — I48.91 ATRIAL FIBRILLATION WITH SLOW VENTRICULAR RESPONSE (H): ICD-10-CM

## 2024-04-15 DIAGNOSIS — K57.20 DIVERTICULITIS OF LARGE INTESTINE WITH ABSCESS, UNSPECIFIED BLEEDING STATUS: ICD-10-CM

## 2024-04-15 DIAGNOSIS — D72.9 ABNORMAL WBC COUNT: ICD-10-CM

## 2024-04-15 DIAGNOSIS — R21 RASH AND NONSPECIFIC SKIN ERUPTION: Primary | ICD-10-CM

## 2024-04-15 DIAGNOSIS — R53.81 PHYSICAL DECONDITIONING: ICD-10-CM

## 2024-04-15 DIAGNOSIS — R29.6 REPEATED FALLS: ICD-10-CM

## 2024-04-15 LAB
ERYTHROCYTE [DISTWIDTH] IN BLOOD BY AUTOMATED COUNT: 14.8 % (ref 10–15)
HCT VFR BLD AUTO: 35.1 % (ref 35–47)
HGB BLD-MCNC: 11.6 G/DL (ref 11.7–15.7)
MCH RBC QN AUTO: 30.9 PG (ref 26.5–33)
MCHC RBC AUTO-ENTMCNC: 33 G/DL (ref 31.5–36.5)
MCV RBC AUTO: 93 FL (ref 78–100)
PLATELET # BLD AUTO: 116 10E3/UL (ref 150–450)
RBC # BLD AUTO: 3.76 10E6/UL (ref 3.8–5.2)
WBC # BLD AUTO: 4.1 10E3/UL (ref 4–11)

## 2024-04-15 PROCEDURE — 80048 BASIC METABOLIC PNL TOTAL CA: CPT | Mod: ORL | Performed by: NURSE PRACTITIONER

## 2024-04-15 PROCEDURE — 99309 SBSQ NF CARE MODERATE MDM 30: CPT | Performed by: NURSE PRACTITIONER

## 2024-04-15 PROCEDURE — 85027 COMPLETE CBC AUTOMATED: CPT | Mod: ORL | Performed by: NURSE PRACTITIONER

## 2024-04-15 PROCEDURE — P9604 ONE-WAY ALLOW PRORATED TRIP: HCPCS | Mod: ORL | Performed by: NURSE PRACTITIONER

## 2024-04-15 PROCEDURE — 36415 COLL VENOUS BLD VENIPUNCTURE: CPT | Mod: ORL | Performed by: NURSE PRACTITIONER

## 2024-04-15 RX ORDER — LANOLIN ALCOHOL/MO/W.PET/CERES
3 CREAM (GRAM) TOPICAL
COMMUNITY
Start: 2024-04-15

## 2024-04-15 RX ORDER — DIPHENHYDRAMINE HCL 25 MG
25 TABLET ORAL EVERY 6 HOURS
COMMUNITY
Start: 2024-04-16 | End: 2024-04-17

## 2024-04-15 RX ORDER — DIPHENHYDRAMINE HCL 25 MG
25 TABLET ORAL EVERY 8 HOURS PRN
COMMUNITY
Start: 2024-04-17 | End: 2024-04-19

## 2024-04-15 NOTE — PROGRESS NOTES
[place] [facility]  [place] [facility] Record Number:  [id number]  Place of Service where encounter took place:  [facility] ([facility]) [[id number]]  Chief Complaint   Patient presents with    Nursing Home Acute       HPI:    Savanna Rehman  is a 83 year old (1940), who is being seen today for an episodic care visit.  HPI information obtained from: facility chart records, facility staff, patient report, and [facility] chart review. Today's concern is: yesterday, had  rash from the PPM drsg, go across upper chest, to lips and tongue which tingled and felt swollen.. no sob, or other symptoms. Started on benadryl by on call NP  Today she said she feels  better, less itchy, tongue and lips improved.  She feels tired and ?dizzy from the benadryl though.       Rash and nonspecific skin eruption  Diverticulitis of large intestine with abscess, unspecified bleeding status  Thrombocytopenia (H24)  Abnormal WBC count  Atrial fibrillation with slow ventricular response (H)  S/P placement of cardiac pacemaker  Anticoagulated on Coumadin  Hypertensive heart disease with chronic diastolic congestive heart failure (H)  Type 2 diabetes mellitus with stage 3a chronic kidney disease, without long-term current use of insulin (H)  Physical deconditioning  Repeated falls     Past Medical and Surgical History reviewed in Epic today.    MEDICATIONS:     Current Outpatient Medications   Medication Sig Dispense Refill    diphenhydrAMINE (BENADRYL ALLERGY) 25 MG tablet Take 25 mg by mouth every 6 hours as needed for itching or allergies 4/15/2024: hold the prn benadryl while on the scheduled benadryl      diphenhydrAMINE (BENADRYL) 25 MG tablet Take 25 mg by mouth every 8 hours Thru 0600 on 4/16/2024.  Then resume the prn 25mg benadryl q 6 hr prn      melatonin 3 MG tablet Take 3 mg by mouth nightly as needed for sleep      PREDNISONE PO Take 40 mg by mouth daily For three doses: 4/15, 4/16 and  "4/17      acetaminophen (TYLENOL) 500 MG tablet Take 1,000 mg by mouth every 6 hours as needed for mild pain      amoxicillin-clavulanate (AUGMENTIN) 875-125 MG tablet Take 1 tablet by mouth 2 times daily for 10 days (Patient taking differently: Take 1 tablet by mouth 2 times daily Done on 4/18/2024)      Cholecalciferol (VITAMIN D-3) 125 MCG (5000 UT) TABS Take 5,000 Units by mouth daily      escitalopram (LEXAPRO) 5 MG tablet Take 1 tablet by mouth once daily 30 tablet 0    famotidine (PEPCID) 20 MG tablet Take 20 mg by mouth 2 times daily      furosemide (LASIX) 20 MG tablet Take 1 tablet (20 mg) by mouth daily 30 tablet 1    glipiZIDE (GLUCOTROL XL) 2.5 MG 24 hr tablet Take 1 tablet by mouth twice daily 180 tablet 0    indapamide (LOZOL) 2.5 MG tablet Take 1 tablet (2.5 mg) by mouth every morning 90 tablet 1    loperamide (IMODIUM) 2 MG capsule TAKE 1 CAPSULE BY MOUTH 4 TIMES DAILY AS NEEDED FOR DIARRHEA 60 capsule 0    warfarin ANTICOAGULANT (COUMADIN) 2.5 MG tablet Take 3.75 mg by mouth six times a week Daily except on Mondays (Patient not taking: Reported on 4/12/2024)      warfarin ANTICOAGULANT (COUMADIN) 2.5 MG tablet Take 2.5 mg by mouth once a week On Monday (Patient not taking: Reported on 4/12/2024)      Warfarin Sodium (COUMADIN PO) Take by mouth daily 4/12/24: INR 1.6 today  Take coumadin 7.5 mg on 4/12 and 4/13 and then 5mg on 4/14 and 4/15 then check INR on 4/16          TODAY DURING EXAM/ROS:  No CP, SOB, Cough, fevers, chills, HA, N/V, dysuria or Bowel Abnormalities. Appetite is good.  No pain. Dizziness as noted above.      Objective:  BP (!) 149/71   Pulse 70   Temp (!) 96  F (35.6  C)   Resp 16   Ht 1.727 m (5' 8\")   Wt 108 kg (238 lb)   LMP  (LMP Unknown)   SpO2 95%   BMI 36.19 kg/m    Exam:  GENERAL APPEARANCE:  A&O, NAD, morbidly obese, cooperative  ENT:  Mouth with moist mucous membranes, normal hearing acuity  EYES:  Conjunctiva and lids normal  RESP:  respiratory effort  of chest " normal, CTA, ,NAD    CV: Auscultation of heart done , IRRR, no murmur, has  trace bilat pedal/ankle edema Lt > Rt, + pedal pulses  ABDOMEN:  normal bowel sounds, soft, nontender, obese  M/S:   AGUILAR equally, up in chair.  SKIN:  Inspection of skin at baseline except: at the pp, site: no rash over where drsg was but blotchy rash, non raised across upper chest and breasts.  Face is blotchy rash, lips don't appear swollen, tongue is red--does not appear swollen(*pt feels lips and tongue better but not at baseline*).  NEURO:   Cranial nerves are grossly intact and at patient's baseline  PSYCH:  oriented X 3, normal insight, judgement and memory    Recent Labs   Lab Test 04/15/24  1040 04/05/24  0937    140   POTASSIUM 4.3 3.8   CHLORIDE 99 103   CO2 30* 27   ANIONGAP 10 10   GLC 92 110*   BUN 22.1 14.1   CR 1.29* 1.21*   SAUL 9.0 9.0    < > = values in this interval not displayed.     CBC RESULTS:   Recent Labs   Lab Test 04/15/24  1040   WBC 4.1   RBC 3.76*   HGB 11.6*   HCT 35.1   MCV 93   MCH 30.9   MCHC 33.0   RDW 14.8   *     Recent Labs   Lab Test 04/05/24  0937   WBC 3.1*   RBC 3.85   HGB 11.9   HCT 35.8   MCV 93   MCH 30.9   MCHC 33.2   RDW 14.1   *       ASSESSMENT / PLAN:  (R21) Rash and nonspecific skin eruption  (primary encounter diagnosis)  Comment: on no new meds, lotions or foods.  Seems to have spread from the ppm drsg. Will order benadryl scheduled for 24 hr then cont prn. Also will give prednisone for 3 days.  Monitor. Check in am. Add melatonin  for sleep.     (K57.20) Diverticulitis of large intestine with abscess, unspecified bleeding status  (primary encounter diagnosis)  (D69.6) Thrombocytopenia (H24)  (D72.9) Abnormal WBC count  Comment: Wbc, Hgb better. Plts better but low, recheck in 2-3 weeks or prn. The Abx done on 4/18-monitor.    (I48.91) Atrial fibrillation with slow ventricular response (H)  (Z95.0) S/P placement of cardiac pacemaker: 4/5/2024  (Z79.01) Anticoagulated on  Coumadin  (I11.0,  I50.32) Hypertensive heart disease with chronic diastolic congestive heart failure (H)  Comment: wt down from 244# on admit to 236# today. Lessening  edema,-cont Tubigrips. INR  on 4/16.    (E11.22,  N18.31) Type 2 diabetes mellitus with stage 3a chronic kidney disease, without long-term current use of insulin (H)  Comment: accuchecks in low to mid 100's, cont  on glipizide, , BMP steady, check prn as condition warrants    (R53.81) Physical deconditioning  (R29.6) Repeated falls  Comment: PT OT       Electronically signed by:  BATSHEVA Pelletier CNP

## 2024-04-16 ENCOUNTER — TRANSITIONAL CARE UNIT VISIT (OUTPATIENT)
Dept: GERIATRICS | Facility: CLINIC | Age: 84
End: 2024-04-16
Payer: COMMERCIAL

## 2024-04-16 VITALS
SYSTOLIC BLOOD PRESSURE: 144 MMHG | BODY MASS INDEX: 35.77 KG/M2 | WEIGHT: 236 LBS | RESPIRATION RATE: 18 BRPM | OXYGEN SATURATION: 94 % | HEIGHT: 68 IN | DIASTOLIC BLOOD PRESSURE: 76 MMHG | TEMPERATURE: 97.5 F | HEART RATE: 76 BPM

## 2024-04-16 DIAGNOSIS — F43.23 ADJUSTMENT DISORDER WITH MIXED ANXIETY AND DEPRESSED MOOD: ICD-10-CM

## 2024-04-16 DIAGNOSIS — I48.91 ATRIAL FIBRILLATION WITH SLOW VENTRICULAR RESPONSE (H): Primary | ICD-10-CM

## 2024-04-16 DIAGNOSIS — Z79.01 LONG TERM CURRENT USE OF ANTICOAGULANT THERAPY: ICD-10-CM

## 2024-04-16 DIAGNOSIS — R21 RASH AND NONSPECIFIC SKIN ERUPTION: ICD-10-CM

## 2024-04-16 DIAGNOSIS — Z79.01 ANTICOAGULATED ON COUMADIN: ICD-10-CM

## 2024-04-16 DIAGNOSIS — Z95.0 S/P PLACEMENT OF CARDIAC PACEMAKER: ICD-10-CM

## 2024-04-16 LAB
ANION GAP SERPL CALCULATED.3IONS-SCNC: 10 MMOL/L (ref 7–15)
BUN SERPL-MCNC: 22.1 MG/DL (ref 8–23)
CALCIUM SERPL-MCNC: 9 MG/DL (ref 8.8–10.2)
CHLORIDE SERPL-SCNC: 99 MMOL/L (ref 98–107)
CREAT SERPL-MCNC: 1.29 MG/DL (ref 0.51–0.95)
DEPRECATED HCO3 PLAS-SCNC: 30 MMOL/L (ref 22–29)
EGFRCR SERPLBLD CKD-EPI 2021: 41 ML/MIN/1.73M2
GLUCOSE SERPL-MCNC: 92 MG/DL (ref 70–99)
POTASSIUM SERPL-SCNC: 4.3 MMOL/L (ref 3.4–5.3)
SODIUM SERPL-SCNC: 139 MMOL/L (ref 135–145)

## 2024-04-16 PROCEDURE — 99309 SBSQ NF CARE MODERATE MDM 30: CPT | Performed by: NURSE PRACTITIONER

## 2024-04-16 RX ORDER — PREDNISONE 20 MG/1
20 TABLET ORAL DAILY
COMMUNITY
Start: 2024-04-17 | End: 2024-04-19

## 2024-04-16 RX ORDER — ESCITALOPRAM OXALATE 10 MG/1
10 TABLET ORAL AT BEDTIME
COMMUNITY
Start: 2024-04-17 | End: 2024-04-22

## 2024-04-16 NOTE — PROGRESS NOTES
[facility] ([facility]) [[id number]]  Chief Complaint   Patient presents with    INR Followup   HPI:    Savanna Rehman is a 83 year old  (1940), who is being seen today for an episodic care visit at [facility] Today's concern: INR/Coumadin management for A. Fib and rash F/u.     Atrial fibrillation with slow ventricular response (H)  S/P placement of cardiac pacemaker  Anticoagulated on Coumadin  Long term current use of anticoagulant therapy  Rash and nonspecific skin eruption  Adjustment disorder with mixed anxiety and depressed mood    Bleeding or Thromboembolic Signs/Symptoms: None   No Medication Changes, Dietary Changes, or  Activity Changes.   Bacterial/Viral Infection:  No    Other Concerns:  No    Missed Coumadin Doses:  None  Current Outpatient Medications   Medication Sig Dispense Refill    [START ON 4/17/2024] escitalopram (LEXAPRO) 10 MG tablet Take 10 mg by mouth daily      [START ON 4/17/2024] predniSONE (DELTASONE) 20 MG tablet Take 20 mg by mouth daily On 4/27, 4/18 and 4/19      acetaminophen (TYLENOL) 500 MG tablet Take 1,000 mg by mouth every 6 hours as needed for mild pain      amoxicillin-clavulanate (AUGMENTIN) 875-125 MG tablet Take 1 tablet by mouth 2 times daily for 10 days (Patient taking differently: Take 1 tablet by mouth 2 times daily Done on 4/18/2024)      Cholecalciferol (VITAMIN D-3) 125 MCG (5000 UT) TABS Take 5,000 Units by mouth daily      [START ON 4/17/2024] diphenhydrAMINE (BENADRYL ALLERGY) 25 MG tablet Take 25 mg by mouth every 8 hours as needed for itching or allergies 4/17/2024: hold the prn benadryl while on the scheduled benadryl      diphenhydrAMINE (BENADRYL) 25 MG tablet Take 25 mg by mouth every 6 hours Thru 0600 on 4/17/2024.  Then resume the prn 25mg benadryl at q 8hr prn      escitalopram (LEXAPRO) 5 MG tablet Take 1 tablet by mouth once daily 30 tablet 0    famotidine (PEPCID) 20 MG tablet Take 20 mg by mouth 2 times daily    "   furosemide (LASIX) 20 MG tablet Take 1 tablet (20 mg) by mouth daily 30 tablet 1    glipiZIDE (GLUCOTROL XL) 2.5 MG 24 hr tablet Take 1 tablet by mouth twice daily 180 tablet 0    indapamide (LOZOL) 2.5 MG tablet Take 1 tablet (2.5 mg) by mouth every morning 90 tablet 1    loperamide (IMODIUM) 2 MG capsule TAKE 1 CAPSULE BY MOUTH 4 TIMES DAILY AS NEEDED FOR DIARRHEA 60 capsule 0    melatonin 3 MG tablet Take 3 mg by mouth nightly as needed for sleep      PREDNISONE PO Take 40 mg by mouth daily For two doses: 4/15, 4/16      warfarin ANTICOAGULANT (COUMADIN) 2.5 MG tablet Take 3.75 mg by mouth six times a week Daily except on Mondays (Patient not taking: Reported on 4/12/2024)      warfarin ANTICOAGULANT (COUMADIN) 2.5 MG tablet Take 2.5 mg by mouth once a week On Monday (Patient not taking: Reported on 4/12/2024)      Warfarin Sodium (COUMADIN PO) Take 3.5 mg by mouth daily 4/12/24: INR 1.6 today  Take coumadin 7.5 mg on 4/12 and 4/13 and then 5mg on 4/14 and 4/15 then check INR on 4/16 4/16/2024 INR 2.2 give 3.5mg coumadin daily and check INR on 4/19         On ASA: No  BP (!) 144/76   Pulse 76   Temp 97.5  F (36.4  C)   Resp 18   Ht 1.727 m (5' 8\")   Wt 107 kg (236 lb)   LMP  (LMP Unknown)   SpO2 94%   BMI 35.88 kg/m     SUBJECTIVE/OBJECTIVE: A & O x 3, NAD. Lungs CTA, non labored. RRR, no probs swallowing, scant itching of left chest. S1/S2, trace pedal edema,Abdomen soft, nontender, +BT'S. No focal neurological deficits.   Skin at baseline except: at the ppm site: no rash over where drsg was but blotchy rash, non raised across upper chest and breasts.  Face is blotchy rash, lips don't appear swollen, tongue is red--not  swollen--overall rash is not increased and possibly a little less--she thinks some rash on palms--slight ?spots.    LABS:   INR Today:  2.2       Therapeutic INR for goal of 2-3      ASSESSMENT / PLAN:  (I48.91) Atrial fibrillation with slow ventricular response (H)  (primary " encounter diagnosis)  (Z95.0) S/P placement of cardiac pacemaker: 4/5/2024  (Z79.01) Anticoagulated on Coumadin  (Z79.01) Long term current use of anticoagulant therapy  Comment: INR 2.2, coumadin 3.5mg daily and check INR on 4./19    (R21) Rash and nonspecific skin eruption  Comment: not increased--slightly less but will cont scheduled benadryl another day and stretch out the prednisone also. Pt says no probs breathing or swallowing. Monitor.    (F43.23) Adjustment disorder with mixed anxiety and depressed mood  Comment: pt would like increased of Lexapro---wants to see psychologist as outpt. She will d/w with her PCP. Use to see when lived in [place] in the past.      Electronically signed by:  Prabha Delgadillo RN, CNP

## 2024-04-16 NOTE — TELEPHONE ENCOUNTER
Competing sequential lab work at [facility] to be followed by [facility] team

## 2024-04-17 ENCOUNTER — TELEPHONE (OUTPATIENT)
Dept: CARDIOLOGY | Facility: CLINIC | Age: 84
End: 2024-04-17
Payer: COMMERCIAL

## 2024-04-17 NOTE — TELEPHONE ENCOUNTER
Patient's daughter called to reschedule her device check coming up on Friday... Patient is still in rehab and will be discharging later that day.    Called Roya back. She was driving so was unable to reschedule at the moment but will call back when she gets home to find a time that works for next week.    JENNIFER HORTON

## 2024-04-17 NOTE — TELEPHONE ENCOUNTER
Roya and Savanna called back. No days work for them next week. The soonest available they could make work is 4/30.  They are aware that this appt is already very late and that typically we like to see patient's in one week. They are aware but state it will not work for her to come in this Friday or any day next week.  Scheduled appt for 4/30 at 2p since this was the only day that worked. They will call back if they figure out a sooner day they can make work.  JENNIFER HORTON

## 2024-04-19 ENCOUNTER — TELEPHONE (OUTPATIENT)
Dept: INTERNAL MEDICINE | Facility: CLINIC | Age: 84
End: 2024-04-19

## 2024-04-19 ENCOUNTER — DISCHARGE SUMMARY NURSING HOME (OUTPATIENT)
Dept: GERIATRICS | Facility: CLINIC | Age: 84
End: 2024-04-19
Payer: COMMERCIAL

## 2024-04-19 VITALS
SYSTOLIC BLOOD PRESSURE: 128 MMHG | TEMPERATURE: 97.7 F | BODY MASS INDEX: 35.07 KG/M2 | RESPIRATION RATE: 20 BRPM | HEIGHT: 68 IN | HEART RATE: 76 BPM | WEIGHT: 231.4 LBS | OXYGEN SATURATION: 94 % | DIASTOLIC BLOOD PRESSURE: 70 MMHG

## 2024-04-19 DIAGNOSIS — D69.6 THROMBOCYTOPENIA (H): ICD-10-CM

## 2024-04-19 DIAGNOSIS — Z95.0 S/P PLACEMENT OF CARDIAC PACEMAKER: ICD-10-CM

## 2024-04-19 DIAGNOSIS — I11.0 HYPERTENSIVE HEART DISEASE WITH CHRONIC DIASTOLIC CONGESTIVE HEART FAILURE (H): ICD-10-CM

## 2024-04-19 DIAGNOSIS — N18.31 TYPE 2 DIABETES MELLITUS WITH STAGE 3A CHRONIC KIDNEY DISEASE, WITHOUT LONG-TERM CURRENT USE OF INSULIN (H): ICD-10-CM

## 2024-04-19 DIAGNOSIS — G47.33 OBSTRUCTIVE SLEEP APNEA (ADULT) (PEDIATRIC): ICD-10-CM

## 2024-04-19 DIAGNOSIS — I48.91 ATRIAL FIBRILLATION WITH SLOW VENTRICULAR RESPONSE (H): ICD-10-CM

## 2024-04-19 DIAGNOSIS — K57.20 DIVERTICULITIS OF LARGE INTESTINE WITH ABSCESS, UNSPECIFIED BLEEDING STATUS: Primary | ICD-10-CM

## 2024-04-19 DIAGNOSIS — R29.6 REPEATED FALLS: ICD-10-CM

## 2024-04-19 DIAGNOSIS — R53.81 PHYSICAL DECONDITIONING: ICD-10-CM

## 2024-04-19 DIAGNOSIS — R32 URINARY INCONTINENCE, UNSPECIFIED TYPE: ICD-10-CM

## 2024-04-19 DIAGNOSIS — K52.9 CHRONIC DIARRHEA: ICD-10-CM

## 2024-04-19 DIAGNOSIS — F43.23 ADJUSTMENT DISORDER WITH MIXED ANXIETY AND DEPRESSED MOOD: ICD-10-CM

## 2024-04-19 DIAGNOSIS — D72.9 ABNORMAL WBC COUNT: ICD-10-CM

## 2024-04-19 DIAGNOSIS — Z79.01 ANTICOAGULATED ON COUMADIN: ICD-10-CM

## 2024-04-19 DIAGNOSIS — E11.22 TYPE 2 DIABETES MELLITUS WITH STAGE 3A CHRONIC KIDNEY DISEASE, WITHOUT LONG-TERM CURRENT USE OF INSULIN (H): ICD-10-CM

## 2024-04-19 DIAGNOSIS — R41.89 COGNITIVE IMPAIRMENT: ICD-10-CM

## 2024-04-19 DIAGNOSIS — I50.32 HYPERTENSIVE HEART DISEASE WITH CHRONIC DIASTOLIC CONGESTIVE HEART FAILURE (H): ICD-10-CM

## 2024-04-19 PROCEDURE — 99316 NF DSCHRG MGMT 30 MIN+: CPT | Performed by: NURSE PRACTITIONER

## 2024-04-19 NOTE — PROGRESS NOTES
"[place] [facility] DISCHARGE SUMMARY    PATIENT'S NAME: Savanna Rehman  YOB: 1940    PRIMARY CARE PROVIDER AND CLINIC RESPONSIBLE AFTER TRANSFER: Taylor Payan     CODE STATUS: FULL CODE    TRANSFERRING PROVIDERS: BATSHEVA Pelletier CNP, Dr. Soraya Bailey MD      DATE OF SNF ADMISSION:  April / 08 / 2024    DATE OF SNF DISCHARGE (including anticipating DC): April / 19 / 2024.    DISCHARGE DISPOSITION: FMG Provider    Nursing Facility: [facility] Hospital stay 4/4/24 to 4/8/24.     Condition on Discharge:  Improving.    Function:  Ambulates: 300 ft w/fww Transfers: mod 1  Cognitive Scores:  TBA     Physical Function: TUG 17  Equipment: walker  DME: Walker    DISCHARGE DIAGNOSIS:      Diverticulitis of large intestine with abscess, unspecified bleeding status  Thrombocytopenia (H24)  Abnormal WBC count  Atrial fibrillation with slow ventricular response (H)  S/P placement of cardiac pacemaker  Anticoagulated on Coumadin  Hypertensive heart disease with chronic diastolic congestive heart failure (H)  Type 2 diabetes mellitus with stage 3a chronic kidney disease, without long-term current use of insulin (H)  Physical deconditioning  Repeated falls  Cognitive impairment  Adjustment disorder with mixed anxiety and depressed mood  Chronic diarrhea  Urinary incontinence, unspecified type  Obstructive sleep apnea (adult) (pediatric)        HOSPITAL COURSE:  PER DR SORAYA BAILEY'S ADMIT NOTE QUOTING: \"Savanna Rehman is a 83 year old female seen April 10, 2024 at [facility] TCU where she was admitted after [facility] hospitalization 4/4-8 for sigmoid diverticulitis with abscess    She presented with LLQ pain, N/V/D    CT showed acute sigmoid diverticulitis with adjacent abscess, not amenable to IR drainage   She was seen by Colorectal surgery and managed non operatively   Treated with IV Zosyn / po Augmentin and " "symptoms improved.     Had lightheadedness during her stay and found to have atrial fib with slow VR    HR to 30's    Pacemaker placed on 4/5     Improved and discharged to TCU for ongoing recovery and Rehab. \"      TCU/SNF COURSE:  Pt has  with PT and OT--met goals, wants to go a day early. Issues while here: INR slow to rise to therapeutic but fine now  Had an atopic dermatitis from her ppm drsg that spread to across chest, lips and tongue then palms of hands.   Given tapering course of prednisone and benadryl.   Today essentially gone and some peeling skin on fingers. Platelets low but steady, otherwise feels good, scant discomfort in left abdominal areas done with Abx on 4/18    PAST MEDICAL HISTORY:  Past Medical History:   Diagnosis Date    Acute encephalopathy 09/21/2023    Anemia     Iron Deficiency anemia    Atrial fibrillation (H)     CAD (coronary artery disease)     non-obstructive    Chronic pain     neck, low back, legs    Congestive heart failure (H)     Degenerative disk disease     Diabetes (H)     Fibromyalgia     Gastro-oesophageal reflux disease     Gout     Hiatal hernia     Mumps     Neuropathy     CRISTIANA (obstructive sleep apnea) - CPAP     Palpitations     Pernicious anemia     Sleep apnea     uses CPAP.    Urinary incontinence     Vitamin D deficiency        DISCHARGE MEDICATIONS:  Current Outpatient Medications   Medication Sig Dispense Refill    acetaminophen (TYLENOL) 500 MG tablet Take 1,000 mg by mouth every 6 hours as needed for mild pain      Cholecalciferol (VITAMIN D-3) 125 MCG (5000 UT) TABS Take 5,000 Units by mouth daily      escitalopram (LEXAPRO) 10 MG tablet Take 10 mg by mouth at bedtime      famotidine (PEPCID) 20 MG tablet Take 20 mg by mouth 2 times daily      furosemide (LASIX) 20 MG tablet Take 1 tablet (20 mg) by mouth daily 30 tablet 1    glipiZIDE (GLUCOTROL XL) 2.5 MG 24 hr tablet Take 1 tablet by mouth twice daily 180 tablet 0    indapamide (LOZOL) 2.5 MG tablet Take 1 " "tablet (2.5 mg) by mouth every morning 90 tablet 1    loperamide (IMODIUM) 2 MG capsule TAKE 1 CAPSULE BY MOUTH 4 TIMES DAILY AS NEEDED FOR DIARRHEA 60 capsule 0    melatonin 3 MG tablet Take 3 mg by mouth nightly as needed for sleep      warfarin ANTICOAGULANT (COUMADIN) 2.5 MG tablet Take 3.75 mg by mouth six times a week Daily except on Mondays      warfarin ANTICOAGULANT (COUMADIN) 2.5 MG tablet Take 2.5 mg by mouth once a week On Monday      Warfarin Sodium (COUMADIN PO) Take by mouth daily   4/12/24: INR 1.6 today  Take coumadin 7.5 mg on 4/12 and 4/13 and then 5mg on 4/14 and 4/15 then check INR on 4/16 4/16/2024 INR 2.2 give 3.5mg coumadin daily and check INR on 4/19 4/19/2024:  INR IS 2.1  RESUME HOME REGIMEN OF COUMADIN  as listed       MED REC REQUIRED   Post Medication Reconciliation Status: discharge medications reconciled and changed, per note/orders     MEDICATION CHANGES/RATIONALE:   Lexapro increased and  the coumadin adjusted  /70   Pulse 76   Temp 97.7  F (36.5  C)   Resp 20   Ht 1.727 m (5' 8\")   Wt 105 kg (231 lb 6.4 oz)   LMP  (LMP Unknown)   SpO2 94%   BMI 35.18 kg/m      TODAY DURING EXAM/ROS:  No CP, SOB, Cough, dizziness, fevers, chills, HA, N/V, dysuria or Bowel Abnormalities. Appetite is good.  No pain except as noted above      PHYSICAL EXAM Today:  A & O x 3, NAD. Lungs CTA, non labored. RRR, S1/S2 w/o murmur,gallop or rub.  No edema.  Abdomen soft, nontender, +BT'S. No focal neurological deficits. AGUILAR and up with walker.  Skin: rashes gone, some pealing skin on hands--=otherwise wnl..       SNF LABS  Recent Labs   Lab Test 04/15/24  1040 04/08/24  0537 04/05/24  0937     --  140   POTASSIUM 4.3 4.0 3.8   CHLORIDE 99  --  103   CO2 30*  --  27   ANIONGAP 10  --  10   GLC 92  --  110*   BUN 22.1  --  14.1   CR 1.29*  --  1.21*   SAUL 9.0  --  9.0    < > = values in this interval not displayed.   CBC RESULTS:   Recent Labs   Lab Test 04/15/24  1040   WBC 4.1   RBC " 3.76*   HGB 11.6*   HCT 35.1   MCV 93   MCH 30.9   MCHC 33.0   RDW 14.8   *     Platelet Count   Date Value Ref Range Status   04/15/2024 116 (L) 150 - 450 10e3/uL Final   02/22/2021 185 150 - 450 10e9/L Final                 DISCHARGE PLAN:  Occupational Therapy, Physical Therapy, Registered Nurse, Home Health Aide, and From:  [facility]  Follow-up with PCP in 7 days: 7 days.    Current [place] or other scheduled appointments:  Future Appointments   Date Time Provider Department Center   4/24/2024 10:30 AM [facility]   4/30/2024  2:00 PM DHILLON DCR2 [facility] PSA CLIN   5/1/2024 11:00 AM Carlos Youngblood MD [facility] N   5/1/2024  4:15 PM [facility]   7/17/2024 11:00 AM Sowmya Pollard APRN CNP [facility] EC        MT referral needed and placed by this provider: none    Pending labs: check plts with PCP appt or prn. INR due on 4/22 or 4/23     Discharge Treatments:lotion to hands prn       TOTAL DISCHARGE TIME:   Greater than 30 minutes    BATSHEVA Pelletier CNP    [place] [facility]

## 2024-04-19 NOTE — PATIENT INSTRUCTIONS
Current Outpatient Medications   Medication Sig Dispense Refill    acetaminophen (TYLENOL) 500 MG tablet Take 1,000 mg by mouth every 6 hours as needed for mild pain      Cholecalciferol (VITAMIN D-3) 125 MCG (5000 UT) TABS Take 5,000 Units by mouth daily      escitalopram (LEXAPRO) 10 MG tablet Take 10 mg by mouth at bedtime      famotidine (PEPCID) 20 MG tablet Take 20 mg by mouth 2 times daily      furosemide (LASIX) 20 MG tablet Take 1 tablet (20 mg) by mouth daily 30 tablet 1    glipiZIDE (GLUCOTROL XL) 2.5 MG 24 hr tablet Take 1 tablet by mouth twice daily 180 tablet 0    indapamide (LOZOL) 2.5 MG tablet Take 1 tablet (2.5 mg) by mouth every morning 90 tablet 1    loperamide (IMODIUM) 2 MG capsule TAKE 1 CAPSULE BY MOUTH 4 TIMES DAILY AS NEEDED FOR DIARRHEA 60 capsule 0    melatonin 3 MG tablet Take 3 mg by mouth nightly as needed for sleep      warfarin ANTICOAGULANT (COUMADIN) 2.5 MG tablet Take 3.75 mg by mouth six times a week Daily except on Mondays      warfarin ANTICOAGULANT (COUMADIN) 2.5 MG tablet Take 2.5 mg by mouth once a week On Monday      Warfarin Sodium (COUMADIN PO) Take by mouth daily   4/12/24: INR 1.6 today  Take coumadin 7.5 mg on 4/12 and 4/13 and then 5mg on 4/14 and 4/15 then check INR on 4/16 4/16/2024 INR 2.2 give 3.5mg coumadin daily and check INR on 4/19 4/19/2024:  INR IS 2.1  RESUME HOME REGIMEN OF COUMADIN  as listed

## 2024-04-22 ENCOUNTER — DOCUMENTATION ONLY (OUTPATIENT)
Dept: INTERNAL MEDICINE | Facility: CLINIC | Age: 84
End: 2024-04-22
Payer: COMMERCIAL

## 2024-04-22 ENCOUNTER — PATIENT OUTREACH (OUTPATIENT)
Dept: CARE COORDINATION | Facility: CLINIC | Age: 84
End: 2024-04-22
Payer: COMMERCIAL

## 2024-04-22 ENCOUNTER — TELEPHONE (OUTPATIENT)
Dept: INTERNAL MEDICINE | Facility: CLINIC | Age: 84
End: 2024-04-22
Payer: COMMERCIAL

## 2024-04-22 DIAGNOSIS — F33.1 MODERATE EPISODE OF RECURRENT MAJOR DEPRESSIVE DISORDER (H): Primary | ICD-10-CM

## 2024-04-22 DIAGNOSIS — Z76.89 ENCOUNTER FOR SUPPORT AND COORDINATION OF TRANSITION OF CARE: Primary | ICD-10-CM

## 2024-04-22 RX ORDER — ESCITALOPRAM OXALATE 10 MG/1
10 TABLET ORAL AT BEDTIME
Qty: 90 TABLET | Refills: 1 | Status: SHIPPED | OUTPATIENT
Start: 2024-04-22

## 2024-04-22 NOTE — PROGRESS NOTES
Notice from care team patient has discharged from care facility and MTM referral to be placed for medication review.   Discharge date: TCU to home on 4/20.     Primary Provider Clinic Location:  [facility] [address] [place] [address] [phone]  PCP Listed: Taylor Payan MD    Action:  MTM referral placed    Nora Schroeder, Pharm.D, BCACP  Medication Therapy Management Pharmacist  [phone]

## 2024-04-22 NOTE — TELEPHONE ENCOUNTER
[place] Looking for Skilled  Nursing orders    Skilled Nursing 1 x week  starting tomorrow, underdetermined end date.   Please call back.     Tammy Zee R.N.

## 2024-04-22 NOTE — PROGRESS NOTES
Clinic Care Coordination Contact  [facility]: Post-Discharge Note  SITUATION                                                      Admission:    Admission Date: 04/04/24   Reason for Admission: Atrial fibrillation with slow ventricular response  Discharge:   Discharge Date: 04/20/24  Discharge Diagnosis: Atrial fibrillation with slow ventricular rate, symptomatic   Acute sigmoid diverticulitis with abscess    BACKGROUND                                                      Per hospital discharge summary and inpatient provider notes:  Savanna Rehman is a 83 year-old female with past medical history including chronic atrial fibrillation, hypertension, HFpEF, DM2, CRISTIANA, FERREIRA who presents initially to [facility] with n/v and abdominal pain, admitted for diverticulitis. Subsequently with light-headedness and syncopal episode associated with atrial fibrillation with SVR and transferred to [facility] 4/4/2024 for consideration of pacemaker placement.      Atrial fibrillation with slow ventricular rate, symptomatic  *Noted to have brief syncopal and persistent light-headedness associated with atrial fibrillation with SVR  *Echocardiogram with EF 55-60%, no significant changes from prior   *TSH normal, not on AV shannon blocking agents  *Evaluated by cardiology who discussed with EP and felt appropriate for pacemaker  Plan:   - EP consulted -> PPM placement 04/05  - Resume warfarin  - Follow up INR at discharge     Acute sigmoid diverticulitis with abscess  *Presented with left-lower quadrant pain, n/v, diarrhea  *CT abd/pelvis with acute sigmoid diverticulitis with possible adjacent 2.5 x 1.3 x 2.9 cm abscess  *Colorectal surgery following at [facility], not amenable to IR drainage   - Low fiber after PPM placement  - Colorectal surgery consulted -> non surgical  - Piperacillin-tazobactam IV continued -> Augmentin at discharge  - Tylenol PRN     Chronic HFpEF  Hypertension   *Off fluids   Stable with no evidence  of exacerbation     Acute kidney injury on chronic kidney disease, stage 3  Baseline creatinine 1.1-1.3. Creatinine increased to 1.65 4/4/24. Received contrast 4/1, possibly contributing. Has been receiving frequent ASA as part of Fiorinal, also had furosemide resumed 4/3.   Plan:  - Stop  IVF  - Resume PTA diuretics  - Avoid NSAIDs     Diabetes mellitus, type 2   HgbA1c 6.1% 2/8/2024. Prior to admission on glipizide XL 2.5 mg BID.     FERREIRA cirrhosis  Chronic Thrombocytopenia  LFTs wnl.  Plt 139.  CT revealed cirrhotic changes of the liver with associated secondary findings of portal venous hypertension which would include varicosities and splenomegaly. The portal veins are patent.   - Follow up with GI     Chronic diarrhea  Takes loperamide multiple times daily  - Resume loperamide at discharge     Depression  - Continue prior to admission escitalopram     Obstructive sleep apnea  Non-compliant with CPAP  - Oxygen with sleep as needed     Right Buttocks Fluid Collection, suspected hematoma   Incidentally noted on CT imaging is a nonspecific fluid collection in the deep subcutaneous fat over the right buttocks region measuring 7.8 x 3.0 x 5.9 cm.  Likely due to hematoma as pt reports this has been present for months following a prior fall.  - Monitor     ASSESSMENT       Discharge Assessment  How are you doing now that you are home?: I'm doing ok except for small things. Lightheaded due to haven't drank enough water. Hasn't had time to drink more water. She is on 3 diuretics.  How are your symptoms? (Red Flag symptoms escalate to triage hotline per guidelines): Improved  Do you feel your condition is stable enough to be safe at home until your provider visit?: Yes  Does the patient have their discharge instructions? : Yes  Does the patient have questions regarding their discharge instructions? : No  Were you started on any new medications or were there changes to any of your previous medications? : Yes  Does the  patient have all of their medications?: Yes  Do you have questions regarding any of your medications? : No  Do you have all of your needed medical supplies or equipment (DME)?  (i.e. oxygen tank, CPAP, cane, etc.): Yes  Discharge follow-up appointment scheduled within 14 calendar days? : Yes  Discharge Follow Up Appointment Date: 04/24/24  Discharge Follow Up Appointment Scheduled with?: Primary Care Provider    Post-op (CHW CTA Only)  If the patient had a surgery or procedure, do they have any questions for a nurse?: No    Post-op (Clinicians Only)  Did the patient have surgery or a procedure: No  Fever: No  Chills: No  Eating & Drinking: eating and drinking without complaints/concerns  PO Intake:  (Low sodium, no white foods)  Additional Symptoms: decreased appetite  Bowel Function: loose stools  Date of last BM: 04/22/24  Urinary Status: voiding without complaint/concerns    PLAN                                                      Outpatient Plan:  Follow up with Nursing home physician. Check INR   Occupational Therapy, Physical Therapy, Registered Nurse, Home Health Aide, and From:  [facility]  Follow-up with PCP in 7 days: 7 days.      RN CC contacted patient for post-hospital follow up and to introduce Care Coordination. Full assessment not indicated as patient declined to have Care Coordination support at this time. She was agreeable to receiving an introduction letter with additional information on Care Coordination via [employer]. Verified patient address in chart is valid.     RN CC will send patient introduction letter via [employer].     Future Appointments   Date Time Provider Department Center   4/24/2024 10:30 AM Taylor Payan MD [facility]   4/30/2024  2:00 PM DHILLON DCR2 [facility] PSA CLIN   5/1/2024 11:00 AM Carlos Youngblood MD [facility]   5/1/2024  4:15 PM Aniyah Waller DO [facility] PSA CLIN   7/17/2024 11:00 AM Sowmya Pollard APRN CNP [facility] EC     For any urgent concerns, please  contact our 24 hour nurse triage line: [phone] ([phone])       Jen Gramajo RN, BSN, CPHN, [facility] Ambulatory Care Management  [facility]  Phone: [phone]  Email: [email or web address]

## 2024-04-22 NOTE — LETTER
[facility] CARE COORDINATION  [address]  [place] [address]    April 22, 2024    Savanna Rehman  [id number]   [place] [address]      Dear Savanna,    I am a clinic care coordinator who works with Taylor Payan MD with the [facility]. I wanted to introduce myself and provide you with my contact information for you to be able to call me with any questions or concerns. I wanted to thank you for spending the time to talk with me.  Below is a description of clinic care coordination and how I can further assist you.       The clinic care coordination team is made up of a registered nurse, , financial resource worker and community health worker who understand the health care system. The goal of clinic care coordination is to help you manage your health and improve access to the health care system. Our team works alongside your provider to assist you in determining your health and social needs. We can help you obtain health care and community resources, providing you with necessary information and education. We can work with you through any barriers and develop a care plan that helps coordinate and strengthen the communication between you and your care team.  Our services are voluntary and are offered without charge to you personally.    Please feel free to contact me with any questions or concerns regarding care coordination and what we can offer.      We are focused on providing you with the highest-quality healthcare experience possible.    Sincerely,     Jen Gramajo RN, BSN, CPHN, CCM  [facility] Ambulatory Care Management  [facility]  Phone: [phone]  Email: [email or web address]    Enclosed: Additional information on Care Coordination.      WHAT IS CARE COORDINATION?     [facility] Care Coordination supports patients and families dealing with chronic or complex health conditions,  developmental issues, and social service needs. This service is available to patients of all ages, from babies to seniors. When you re facing a difficult decision about caring for yourself or someone you love, we can help you understand your options. We identify and refer you to community resources that help with financial, legal, mental health, transportation, and other issues. We also help with your medical and related education needs.     IS CARE COORDINATION RIGHT FOR ME?      Discuss a referral to Care Coordination with your primary care provider or care team member.     HOW CAN I CONNECT WITH CARE COORDINATION?      Contact your clinic.  Speak with your doctor or clinic staff.  Discuss care coordination with hospital staff before discharge.    MEET YOUR CARE COORDINATION TEAM     Registered Nurse Care Coordinator  Provides education on medications, disease management, and new diagnoses.  Addresses concerns about medical conditions and connects you to resources.  Develops patient-centered goals and communicates with patient s care team.     Social Work Care Coordinator  Provides education on emotional wellbeing.  Connects you to a variety of community-based resources.  Communicates with care team and community partners.     Community Health Worker  Identifies health and social barriers and connects you with community resources.  Develops nonclinical patient and family centered goals.  Enhances communication between patients and care teams.     Financial Resource Worker  Assists patients with applying for health insurance ([place], [employer], [employer]).  Helps patients with applying for county benefits.  Connects patients with [facility]

## 2024-04-23 ENCOUNTER — ANTICOAGULATION THERAPY VISIT (OUTPATIENT)
Dept: ANTICOAGULATION | Facility: CLINIC | Age: 84
End: 2024-04-23
Payer: COMMERCIAL

## 2024-04-23 DIAGNOSIS — I48.21 PERMANENT ATRIAL FIBRILLATION (H): Primary | ICD-10-CM

## 2024-04-23 DIAGNOSIS — I48.20 CHRONIC ATRIAL FIBRILLATION (H): ICD-10-CM

## 2024-04-23 DIAGNOSIS — I50.9 CONGESTIVE HEART FAILURE, UNSPECIFIED HF CHRONICITY, UNSPECIFIED HEART FAILURE TYPE (H): ICD-10-CM

## 2024-04-23 LAB — INR (EXTERNAL): 1.6

## 2024-04-23 NOTE — PROGRESS NOTES
ANTICOAGULATION MANAGEMENT     Savanna Rehman 83 year old female is on warfarin with subtherapeutic INR result. (Goal INR 2.0-3.0)    Recent labs: (last 7 days)     04/23/24  0000   INR 1.6       ASSESSMENT     Source(s): Chart Review and Home Care/Facility Nurse     Warfarin doses taken:  Patient discharged from TCU on Friday 4/19/24 home care see patient today and unsure how patient has been taking warfarin over the weekend.   Diet: No new diet changes identified  Medication/supplement changes: None noted  New illness, injury, or hospitalization: Yes: Discharged from TCU on 3/19/24 with order to take 2.5 mg on Mon, 3.75 mg all other days.   Signs or symptoms of bleeding or clotting: No  Previous result:  4/19/24 - 2.1 from TCU  Additional findings: None       PLAN     Recommended plan for temporary change(s) affecting INR     Dosing Instructions: booster dose then continue your current warfarin dose with next INR in 1 week       Summary  As of 4/23/2024      Full warfarin instructions:  4/23: 5 mg; Otherwise 2.5 mg every Mon; 3.75 mg all other days   Next INR check:  4/30/2024               Telephone call with Raj home care nurse who agrees to plan and repeated back plan correctly    Orders given to  Homecare nurse/facility to recheck    Education provided:   Please call back if any changes to your diet, medications or how you've been taking warfarin    Plan made per [facility] anticoagulation protocol    Lucia Mai RN  Anticoagulation Clinic  4/23/2024    _______________________________________________________________________     Anticoagulation Episode Summary       Current INR goal:  2.0-3.0   TTR:  55.0% (11.3 mo)   Target end date:  Indefinite   Send INR reminders to:  INKKI CHASE    Indications    Permanent atrial fibrillation (H) [I48.21]  Chronic atrial fibrillation (H) [I48.20]  Long term (current) use of anticoagulants (Resolved) [Z79.01]             Comments:  Home care              Anticoagulation  Care Providers       Provider Role Specialty Phone number    Patti Suárez MD Referring Internal Medicine [phone]

## 2024-04-23 NOTE — TELEPHONE ENCOUNTER
Patient calls to report she needs furosemide 20 mg sent over as she is out completely today.     Patient has appointment tomorrow with primary care provider tomorrow. Patient states she would like the refill done today if possible.     Thank you,  Dawood Ortez, Triage RN [facility]  3:58 PM 4/23/2024

## 2024-04-24 ENCOUNTER — OFFICE VISIT (OUTPATIENT)
Dept: INTERNAL MEDICINE | Facility: CLINIC | Age: 84
End: 2024-04-24
Payer: COMMERCIAL

## 2024-04-24 VITALS
BODY MASS INDEX: 34.13 KG/M2 | RESPIRATION RATE: 20 BRPM | OXYGEN SATURATION: 94 % | DIASTOLIC BLOOD PRESSURE: 72 MMHG | HEIGHT: 68 IN | WEIGHT: 225.2 LBS | HEART RATE: 77 BPM | TEMPERATURE: 97.4 F | SYSTOLIC BLOOD PRESSURE: 126 MMHG

## 2024-04-24 DIAGNOSIS — N18.31 TYPE 2 DIABETES MELLITUS WITH STAGE 3A CHRONIC KIDNEY DISEASE, WITHOUT LONG-TERM CURRENT USE OF INSULIN (H): ICD-10-CM

## 2024-04-24 DIAGNOSIS — F33.41 RECURRENT MAJOR DEPRESSIVE DISORDER, IN PARTIAL REMISSION (H): ICD-10-CM

## 2024-04-24 DIAGNOSIS — E11.22 TYPE 2 DIABETES MELLITUS WITH STAGE 3A CHRONIC KIDNEY DISEASE, WITHOUT LONG-TERM CURRENT USE OF INSULIN (H): ICD-10-CM

## 2024-04-24 DIAGNOSIS — H61.21 EXCESSIVE CERUMEN IN RIGHT EAR CANAL: ICD-10-CM

## 2024-04-24 DIAGNOSIS — I48.20 CHRONIC ATRIAL FIBRILLATION (H): Primary | ICD-10-CM

## 2024-04-24 DIAGNOSIS — I50.9 CONGESTIVE HEART FAILURE, UNSPECIFIED HF CHRONICITY, UNSPECIFIED HEART FAILURE TYPE (H): ICD-10-CM

## 2024-04-24 DIAGNOSIS — Z74.09 IMPAIRED MOBILITY: ICD-10-CM

## 2024-04-24 PROBLEM — F33.9 MAJOR DEPRESSION, RECURRENT (H): Status: ACTIVE | Noted: 2024-04-24

## 2024-04-24 PROCEDURE — 69209 REMOVE IMPACTED EAR WAX UNI: CPT | Mod: RT | Performed by: INTERNAL MEDICINE

## 2024-04-24 PROCEDURE — 99495 TRANSJ CARE MGMT MOD F2F 14D: CPT | Mod: 24 | Performed by: INTERNAL MEDICINE

## 2024-04-24 RX ORDER — FUROSEMIDE 20 MG
20 TABLET ORAL DAILY
Qty: 30 TABLET | Refills: 1 | Status: SHIPPED | OUTPATIENT
Start: 2024-04-24 | End: 2024-06-04

## 2024-04-24 NOTE — PROGRESS NOTES
Assessment & Plan     Chronic atrial fibrillation (H)  Overall, clinically stable.  S/p pacemaker.  Incisional area healing well.  Patient continues on warfarin.    Recurrent major depressive disorder, in partial remission (H24)  Encouraged to be taking escitalopram daily.  Would like to go ahead with psychology referral for counseling.  - Adult Mental Health  Referral; Future    Type 2 diabetes mellitus with stage 3a chronic kidney disease, without long-term current use of insulin (H)  Continues on glipizide 2.5 mg twice daily.  A1c within target.    Congestive heart failure, unspecified HF chronicity, unspecified heart failure type (H)  Clinically stable.  Continues on furosemide and indapamide.  Overall, lower leg swelling has improved with no recurrence.    Excessive cerumen in right ear canal  Cerumenosis is noted.  Wax is removed by syringing and manual debridement.   - VA REMOVAL IMPACTED CERUMEN IRRIGATION/LVG UNILAT    Impaired mobility  Impaired mobility and unsteadiness.  Patient will benefit from walker.  Walking needed for completion of daily tasks.  Order completed and faxed.  - Walker Order for DME - ONLY FOR DME      MED REC REQUIRED  Post Medication Reconciliation Status: Completed        Subjective   Savanna is a 83 year old, presenting for the following health issues:  Follow Up        4/24/2024    10:12 AM   Additional Questions   Roomed by Nydiai   Accompanied by Friend      History of Present Illness       Reason for visit:  Follow    She eats 0-1 servings of fruits and vegetables daily.She consumes 0 sweetened beverage(s) daily.She exercises with enough effort to increase her heart rate 9 or less minutes per day.  She exercises with enough effort to increase her heart rate 7 days per week.   She is taking medications regularly.           4/22/2024     9:26 AM   Post Discharge Outreach   Admission Date 4/4/2024   Reason for Admission Atrial fibrillation with slow ventricular response    Discharge Date 4/20/2024   Discharge Diagnosis Atrial fibrillation with slow ventricular rate, symptomatic   Acute sigmoid diverticulitis with abscess   How are you doing now that you are home? I'm doing ok except for small things. Lightheaded due to haven't drank enough water. Hasn't had time to drink more water. She is on 3 diuretics.   How are your symptoms? (Red Flag symptoms escalate to triage hotline per guidelines) Improved   Do you feel your condition is stable enough to be safe at home until your provider visit? Yes   Does the patient have their discharge instructions?  Yes   Does the patient have questions regarding their discharge instructions?  No   Were you started on any new medications or were there changes to any of your previous medications?  Yes   Does the patient have all of their medications? Yes   Do you have questions regarding any of your medications?  No   Do you have all of your needed medical supplies or equipment (DME)?  (i.e. oxygen tank, CPAP, cane, etc.) Yes   Discharge follow-up appointment scheduled within 14 calendar days?  Yes   Discharge Follow Up Appointment Date 4/24/2024   Discharge Follow Up Appointment Scheduled with? Primary Care Provider     Hospital Follow-up Visit:    Hospital/Nursing Home/IP Rehab Facility:  [facility]  Date of Admission: 04/08/2024  Date of Discharge: 04/19/2024  Reason(s) for Admission: sigmoid diverticulitis with abscess   Was the patient in the ICU or did the patient experience delirium during hospitalization?  No      Problems taking medications regularly:  None  Medication changes since discharge: None  Problems adhering to non-medication therapy:  None    Summary of hospitalization:  [facility] discharge summary reviewed  Diagnostic Tests/Treatments reviewed.  Follow up needed: none  Other Healthcare Providers Involved in Patient s Care:         None  Update since discharge: improved.     Patient comes in today  "for follow-up after most recent hospitalization.  Accompanied by friend.  Presented with left lower quadrant pain and nausea and vomiting and diarrhea.  CT abdomen pelvis with acute sigmoid diverticulitis.  Patient managed with antibiotics.  To follow-up with GI team.  Noted cirrhosis on CT abdomen with chronic thrombocytopenia.  Will be following up with GI team and hematology team for the same.  As well, patient is s/p pacemaker placement on 04/05.  Has resumed warfarin medication overall, patient is doing well.    Plan of care communicated with patient                 Review of Systems  Constitutional, HEENT, cardiovascular, pulmonary, gi and gu systems are negative, except as otherwise noted.      Objective    /72 (BP Location: Right arm, Patient Position: Sitting, Cuff Size: Adult Regular)   Pulse 77   Temp 97.4  F (36.3  C) (Tympanic)   Resp 20   Ht 1.727 m (5' 8\")   Wt 102.2 kg (225 lb 3.2 oz)   LMP  (LMP Unknown)   SpO2 94%   BMI 34.24 kg/m    Body mass index is 34.24 kg/m .  Physical Exam   GENERAL: alert and no distress  RESP: lungs clear to auscultation - no rales, rhonchi or wheezes  CV: regular rate and rhythm, normal S1 S2  MS: no gross musculoskeletal defects noted, no edema  NEURO: Normal strength and tone, mentation intact and speech normal  PSYCH: mentation appears normal, affect normal/bright            Signed Electronically by: Taylor Payan MD    "

## 2024-04-25 ENCOUNTER — TELEPHONE (OUTPATIENT)
Dept: INTERNAL MEDICINE | Facility: CLINIC | Age: 84
End: 2024-04-25

## 2024-04-25 ENCOUNTER — TELEPHONE (OUTPATIENT)
Dept: CARDIOLOGY | Facility: CLINIC | Age: 84
End: 2024-04-25

## 2024-04-25 NOTE — TELEPHONE ENCOUNTER
Pt had called wanting to know when she could use her arm.  Called pt back, but did not answer.  Left a detailed VM stating that it was past the 2 week mary kay and that she could use her arm as normal.  Left clinic number in case she had further questions.

## 2024-04-29 ENCOUNTER — TELEPHONE (OUTPATIENT)
Dept: INTERNAL MEDICINE | Facility: CLINIC | Age: 84
End: 2024-04-29
Payer: COMMERCIAL

## 2024-04-29 NOTE — TELEPHONE ENCOUNTER
Fax received from [facility] 04/23/24 /Order Nbr. [id number] for review and signature.  Put in Dr. Payan's in basket.

## 2024-04-30 ENCOUNTER — MEDICAL CORRESPONDENCE (OUTPATIENT)
Dept: HEALTH INFORMATION MANAGEMENT | Facility: CLINIC | Age: 84
End: 2024-04-30

## 2024-04-30 ENCOUNTER — ANTICOAGULATION THERAPY VISIT (OUTPATIENT)
Dept: ANTICOAGULATION | Facility: CLINIC | Age: 84
End: 2024-04-30

## 2024-04-30 ENCOUNTER — ANCILLARY PROCEDURE (OUTPATIENT)
Dept: CARDIOLOGY | Facility: CLINIC | Age: 84
End: 2024-04-30
Attending: INTERNAL MEDICINE
Payer: COMMERCIAL

## 2024-04-30 DIAGNOSIS — I48.20 CHRONIC ATRIAL FIBRILLATION (H): ICD-10-CM

## 2024-04-30 DIAGNOSIS — Z53.9 DIAGNOSIS NOT YET DEFINED: Primary | ICD-10-CM

## 2024-04-30 DIAGNOSIS — Z95.0 CARDIAC PACEMAKER IN SITU: ICD-10-CM

## 2024-04-30 DIAGNOSIS — I48.91 ATRIAL FIBRILLATION (H): ICD-10-CM

## 2024-04-30 DIAGNOSIS — I48.21 PERMANENT ATRIAL FIBRILLATION (H): Primary | ICD-10-CM

## 2024-04-30 LAB — INR (EXTERNAL): 2 (ref 0.9–1.1)

## 2024-04-30 PROCEDURE — 93005 ELECTROCARDIOGRAM TRACING: CPT | Performed by: INTERNAL MEDICINE

## 2024-04-30 PROCEDURE — 93279 PRGRMG DEV EVAL PM/LDLS PM: CPT | Performed by: INTERNAL MEDICINE

## 2024-04-30 PROCEDURE — G0180 MD CERTIFICATION HHA PATIENT: HCPCS | Performed by: INTERNAL MEDICINE

## 2024-04-30 NOTE — PROGRESS NOTES
ANTICOAGULATION MANAGEMENT     Savanna Rehman 83 year old female is on warfarin with therapeutic INR result. (Goal INR 2.0-3.0)    Recent labs: (last 7 days)     04/30/24  1240   INR 2.0*       ASSESSMENT     Source(s): Chart Review and Home Care/Facility Nurse     Warfarin doses taken: Booster dose(s) recently taken which may be affecting INR  Diet: No new diet changes identified  Medication/supplement changes: None noted  New illness, injury, or hospitalization: No  Signs or symptoms of bleeding or clotting: No  Previous result: Subtherapeutic  Additional findings: None       PLAN     Recommended plan for temporary change(s) affecting INR     Dosing Instructions: Increase your warfarin dose (5% change) with next INR in 1 week       Summary  As of 4/30/2024      Full warfarin instructions:  3.75 mg every day   Next INR check:  5/7/2024               Telephone call with Evelin home care nurse who agrees to plan and repeated back plan correctly    Orders given to  Homecare nurse/facility to recheck    Education provided:   Please call back if any changes to your diet, medications or how you've been taking warfarin    Plan made per [facility] anticoagulation protocol    Azul Whitaker, RN  Anticoagulation Clinic  4/30/2024    _______________________________________________________________________     Anticoagulation Episode Summary       Current INR goal:  2.0-3.0   TTR:  54.1% (11.3 mo)   Target end date:  Indefinite   Send INR reminders to:  NIKKI CHASE    Indications    Permanent atrial fibrillation (H) [I48.21]  Chronic atrial fibrillation (H) [I48.20]  Long term (current) use of anticoagulants (Resolved) [Z79.01]             Comments:  Home care              Anticoagulation Care Providers       Provider Role Specialty Phone number    Patti Suárez MD Referring Internal Medicine [phone]

## 2024-05-01 ENCOUNTER — VIRTUAL VISIT (OUTPATIENT)
Dept: ONCOLOGY | Facility: HOSPITAL | Age: 84
End: 2024-05-01
Attending: INTERNAL MEDICINE
Payer: COMMERCIAL

## 2024-05-01 VITALS — BODY MASS INDEX: 41.68 KG/M2 | HEIGHT: 68 IN | WEIGHT: 275 LBS

## 2024-05-01 DIAGNOSIS — D69.6 THROMBOCYTOPENIA (H): Primary | ICD-10-CM

## 2024-05-01 DIAGNOSIS — R00.2 PALPITATIONS: ICD-10-CM

## 2024-05-01 PROCEDURE — 99203 OFFICE O/P NEW LOW 30 MIN: CPT | Mod: 95 | Performed by: INTERNAL MEDICINE

## 2024-05-01 ASSESSMENT — PAIN SCALES - GENERAL: PAINLEVEL: NO PAIN (0)

## 2024-05-01 NOTE — PROGRESS NOTES
The patient has chosen to have the visit conducted as a telephone visit, to reduce risk of exposure given the current status of Coronavirus in our community. This telephone visit is being conducted via a call between the patient and physician/provider. Health care needs are being provided without a physical exam.     The patient has been notified of following:      We have found that certain health care needs can be provided without the need for a physical exam.  This service lets us provide the care you need with a short phone conversation.  If a prescription is necessary we can send it directly to your pharmacy.  If lab work is needed we can place an order for that and you can then stop by our lab to have the test done at a later time.  If during the course of the call the physician/provider feels a telephone visit is not appropriate, you will not be charged for this service    The patient consents to a telephone visit.       [facility] Hematology and Oncology Consult Note      Patient: Savanna Rehman  MRN: [id number]  Date of Service: May 1, 2024      We have been asked by Dr. Payan to evaluate Savanna Rehman for thrombocytopenia    Assessment/Plan:    1.  Thrombocytopenia: Mild.  I reviewed her blood count going back over the past few years.  It is mostly been normal.  It was it was low when she was admitted to the hospital with an abscess in the abdomen.  Count nadired at 95 and is now increasing.  It was 116 on April 15.  I would anticipate that it will return back to normal as her intra-abdominal infection completely resolves.  She also has cirrhosis which will contribute to thrombocytopenia.  CT abdomen and pelvis performed on April 1 showed splenomegaly which will also cause thrombocytopenia via sequestration.  Overall I do not get the impression that she has a primary bone marrow disorder.  I do not think she needs any further evaluation at this time.  Would recommend that she have her CBC  checked again in 2 months.  Would also be reasonable to check B12, folate, and thyroid studies at that time.  To make sure these are within the normal range.  She will not need to follow-up with hematology routinely.  She can come back to our clinic on an as-needed basis.  Questions were answered.    Medical decision Making:  I spent 35 minutes in the care of this patient today, which included time necessary for preparation for the visit, face to face time with the patient, communication of recommendations to the care team, and documentation time.    ECOG Performance  1    Staging History:    Cancer Staging   No matching staging information was found for the patient.    History:    Savanna is an 83-year-old woman who is referred today for an evaluation of thrombocytopenia.  She notes that she was admitted to the hospital on April 1 with diverticulitis and abdominal abscess.  She then had some A-fib with RVR and was transferred to [facility] for pacemaker placement.  She was discharged from the hospital on the with a prescription for 10 days of Augmentin.  Was noted that her platelet count was low on April 1 when she was admitted to the hospital.  She does not have a previous history of hematologic evaluation.  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  She is not endorsing any fevers, chills, night sweats.  No bleeding or bruising.  No acute complaints today.    Past History:    Past Medical History:   Diagnosis Date    Acute encephalopathy 09/21/2023    Anemia     Iron Deficiency anemia    Atrial fibrillation (H)     CAD (coronary artery disease)     non-obstructive    Chronic pain     neck, low back, legs    Congestive heart failure (H)     Degenerative disk disease     Diabetes (H)     Fibromyalgia     Gastro-oesophageal reflux disease     Gout     Hiatal hernia     Mumps     Neuropathy     CRISTIANA (obstructive sleep apnea) - CPAP     Palpitations     Pernicious anemia      Sleep apnea     uses CPAP.    Urinary incontinence     Vitamin D deficiency     Family History   Problem Relation Age of Onset    Alzheimer Disease Mother     Lung Cancer Father     No Known Problems Brother     No Known Problems Brother     No Known Problems Brother     Unknown/Adopted No family hx of       [unfilled] Social History     Socioeconomic History    Marital status:      Spouse name: Not on file    Number of children: Not on file    Years of education: Not on file    Highest education level: Not on file   Occupational History    Not on file   Tobacco Use    Smoking status: Former     Current packs/day: 0.00     Average packs/day: 0.5 packs/day for 50.0 years (25.0 ttl pk-yrs)     Types: Cigarettes     Start date: 1964     Quit date: 2014     Years since quittin.6     Passive exposure: Past    Smokeless tobacco: Never   Vaping Use    Vaping status: Never Used   Substance and Sexual Activity    Alcohol use: Yes     Comment: couple a month    Drug use: Never    Sexual activity: Not Currently   Other Topics Concern    Parent/sibling w/ CABG, MI or angioplasty before 65F 55M? Not Asked   Social History Narrative    Not on file     Social Determinants of Health     Financial Resource Strain: Low Risk  (2024)    Financial Resource Strain     Within the past 12 months, have you or your family members you live with been unable to get utilities (heat, electricity) when it was really needed?: No   Food Insecurity: Low Risk  (2024)    Food Insecurity     Within the past 12 months, did you worry that your food would run out before you got money to buy more?: No     Within the past 12 months, did the food you bought just not last and you didn t have money to get more?: No   Transportation Needs: High Risk (2024)    Transportation Needs     Within the past 12 months, has lack of transportation kept you from medical appointments, getting your medicines, non-medical meetings or  appointments, work, or from getting things that you need?: Yes   Physical Activity: Not on file   Stress: Stress Concern Present (1/27/2022)    Citizen of [place] [employer] - Occupational Stress Questionnaire     Feeling of Stress : Very much   Social Connections: Unknown (1/3/2024)    Received from [facility]    Social Connections     Frequency of Communication with Friends and Family: Not on file   Interpersonal Safety: Low Risk  (4/24/2024)    Interpersonal Safety     Do you feel physically and emotionally safe where you currently live?: Yes     Within the past 12 months, have you been hit, slapped, kicked or otherwise physically hurt by someone?: No     Within the past 12 months, have you been humiliated or emotionally abused in other ways by your partner or ex-partner?: No   Housing Stability: Low Risk  (2/1/2024)    Housing Stability     Do you have housing? : Yes     Are you worried about losing your housing?: No        Allergies:    Allergies   Allergen Reactions    Augmented Betamethasone Diprop [Betamethasone] Other (See Comments)     Severe yeast infection    Petrolatum Anaphylaxis and Swelling     Rash and swelling    Shellfish-Derived Products Anaphylaxis     Tongue swelling    Aspirin Swelling     tiongue swelling    Bacitracin      Rash swelling    Bactrim [Sulfamethoxazole-Trimethoprim] Dizziness    Coumadin [Warfarin] Swelling     Leg swelling    Darvon [Propoxyphene] Swelling     Throat closes    Dilaudid [Hydromorphone]      No side effects from fentanyl June 2023    Levaquin [Levofloxacin] Swelling     Tongue swelling    Lidocaine     Morphine Unknown     [facility]/23[facility]24    Neomycin Swelling     rash    Neosporin [Neomycin-Polymyxin-Gramicidin] Swelling     rash    Nitrofurantoin      SOB, GI upset,    Oxycodone      Severe itching    Percocet [Oxycodone-Acetaminophen] Unknown    Percodan  "[Oxycodone-Aspirin]      Severe itching    Polymyxin B     Pramoxine     Tramadol     Vicodin [Hydrocodone-Acetaminophen]      Severe itching      Xarelto [Rivaroxaban]     Adhesive Tape Rash     Band aids     Codeine Rash    Hydrocortisone Rash and Swelling    Other Environmental Allergy Rash     Adhesive tape   Band aids      Review of Systems:    As above in the history.     Review of Systems otherwise Negative for:  General: chills, fever or night sweats  Psychological: anxiety or depression  Ophthalmic: blurry vision, double vision or loss of vision, vision change  ENT: epistaxis, oral lesions, hearing changes  Hematological and Lymphatic: bleeding, bruising, jaundice, swollen lymph nodes  Endocrine: hot flashes, unexpected weight changes  Respiratory: cough, hemoptysis, orthopnea or shortness of breath/ARDON  Cardiovascular: chest pain, edema, palpitations or PND  Gastrointestinal: blood in stools, change in bowel habits, constipation, diarrhea or nausea/vomiting  Genito-Urinary: change in urinary stream, incontinence, frequency/urgency  Musculoskeletal: joint pain, stiffness, swelling, muscle pain  Neurological: dizziness, headaches, numbness/tingling  Dermatological: lumps and rash    Physical Exam:    Ht 1.727 m (5' 8\")   Wt 124.7 kg (275 lb)   LMP  (LMP Unknown)   BMI 41.81 kg/m      Lab Results:    Imaging Results:    Cardiac Device Check - In Clinic    Result Date: 4/30/2024  Medtronic Tegan (S) 7-10 day Post Pacemaker Device Check - DELAYED 1 week check, it has been 3.5 weeks since implant Patient seen in clinic for device evaluation and iterative programming. : 77 %  Mode: VVIR 60        Underlying Rhythm: AF, HR 50-90 bpm  Heart Rate: good variability   Sensing: WNL  Pacing Threshold: WNL  Impedance: WNL  Battery Status: 14.6 yrs estimated longevity   Incision/Complications: WNL, dermabond present, healing well with no signs of infection or hematoma   Atrial Arrhythmia: chronic AF, on warfarin  " Ventricular Arrhythmia: 1 episode, occurred 4/14/2024 at 7:37pm, EGM shows 5 seconds of likely AF with RVR (no change in morphology, irregular rate). Pt denies symptoms. Setting Change: none  Care Plan: 6 week check scheduled with EKG in [place]. Pt has OV with Dr Waller tomorrow.  Discussed incision care, signs of infection, and when to call device clinic; pt states understanding. EC RN I have reviewed and interpreted the device interrogation, settings, programming and nurse's summary. The device is functioning within normal device parameters. I agree with the current findings, assessment and plan.    X-ray Chest 2 vws*    Result Date: 4/6/2024  EXAM: XR CHEST 2 VIEWS LOCATION: [facility] DATE: 4/6/2024 INDICATION: Status post pacer ICD COMPARISON: 02/22/2024     IMPRESSION: New left subclavian approach single pacer with lead tip near the right ventricle. Mild basilar atelectasis. Small pleural effusions are suspected on the lateral view. Stable cardiomediastinal silhouette size. No pneumothorax.     EP Device    Result Date: 4/5/2024  Raul Plunkett MD     4/5/2024 12:19 PM PROCEDURES PERFORMED: - Implantation of single-chamber permanent pacemaker with left bundle pacing - Cardiac fluoroscopy - Conscious sedation, mild-moderate level INDICATIONS: Symptomatic bradycardia, chronic persistent atrial fibrillation PROCEDURE: The benefits and risks of the procedure were discussed with the patient in detail; the patient expressed understanding.  Informed consent was obtained and placed in the chart.  I determined this patient to be an appropriate candidate for the planned sedation and procedure and have reassessed the patient immediately prior to sedation and procedure. The patient was brought to the EP lab in the fasting, non-sedated state.  We continuously monitored EKG, vital signs, oxygenation and level of sedation.  I was present during the entire time that conscious sedation was provided.  " Antibiotic prophylaxis was administered.  The chest was prepped and draped in the usual sterile fashion.  Local anesthetic was administered.  Access to the axillary vein was obtained with ultrasound guidance using the modified Seldinger technique.  One wire was advanced to the IVC.  An incision was made in the left pectoral area.  Dissection was carried down to the myofascial plane and then continued caudally to form the pocket.  Adequate hemostasis was obtained.  One 7.0 Fr sheath was introduced for the ventricular lead.  A His sheath was advanced over a long wire into the mid right ventricle.  The pacemaker lead was advanced through the sheath.  Pacing here showed an initial \"W\" pattern in lead V1.  The lead was screwed in incrementally under fluoroscopy and checked at each step.  The pacing morphology was not ideal so the lead was retracted.  The sheath was repositioned to a slightly more proximal location on the septum and the lead was advanced through the sheath.  Pacing here again showed an initial \"W\" pattern.  The lead was screwed in incrementally and pacing parameters were checked at each step.  LVAT was measured at 71 ms. The lead had sensing, impedance, and threshold.  The sheathes were peeled away and both leads were secured to the pectoral muscle using Ethibond sutures. Normal saline was used to irrigate the pocket. The generator was securely attached to the lead, placed in a Tyrx antimicrobial pouch, and then placed in the pocket. The device was sutured in the pocket with an Ethibond  suture. The pocket was closed in layers.  The deep layers were closed using 2.0 and 3.0 Vicryl and the subcuticular layer was closed with 4.0 Vicryl suture. Dermabond was applied to the skin. The incision was covered with a sterile dressing. There was minimal blood loss (<30 mL) and no immediate complications.  The patient tolerated the procedure well. Implanted Hardware and Parameters: Implant Name Type Inv. Item Serial " No.  Lot No. LRB No. Used Action LEAD PACEMAKER SELECTSECURE 69CM MD   - [id number] Leads LEAD PACEMAKER SELECTSECURE 69CM MD  [id number] [employer] [id number]  1 Implanted PACEMAKER DOMINIC XT SR MRI SYS - [id number] Pacemaker PACEMAKER DOMINIC XT SR MRI SYS [id number] [employer] [id number]  1 Implanted CONCLUSIONS: - Successful implantation of a single-chamber permanent pacemaker with left bundle pacing - Device check and chest X-ray tomorrow morning - Okay to continue PO anticoagulation - Routine follow up after discharge Raul Plunkett MD     Echocardiogram Complete    Result Date: 2024  [id number]^ANDERS^AL^LESLIE  [facility]chocardi[facility] [address] [place] [address]  Name: KRIS SANDERSON MRN: [id number] : 1940 Study Date: 2024 02:06 PM Age: 83 yrs Gender: Female Patient [email or web address] Reason For Study: Atrial Fibrillation Ordering Physician: NATHAN MCNAMARA Performed By: Dale Liu RDCS  BSA: 2.2 m2 Height: 68 in Weight: 246 lb BP: 120/56 mmHg ______________________________________________________________________________ Procedure Complete Portable Echo Adult. ______________________________________________________________________________ Interpretation Summary  Left ventricular systolic function is normal.The visual ejection fraction is 55-60%. The right ventricular systolic function is normal. The left atrium is moderately dilated. There is mild (1+) mitral regurgitation.There is mild to moderate (1-2+) tricuspid regurgitation.There is mild (1+) aortic regurgitation. Dilation of the inferior vena cava is present with abnormal respiratory variation in diameter.  Compared to prior study dated 2024 no significant changes ______________________________________________________________________________ Left Ventricle The left ventricle is normal in size. There is normal left  ventricular wall thickness. Left ventricular systolic function is normal. The visual ejection fraction is 55-60%. Diastolic Doppler findings (E/E' ratio and/or other parameters) suggest left ventricular filling pressures are indeterminate. No regional wall motion abnormalities noted.  Right Ventricle The right ventricle is normal size. The right ventricular systolic function is normal.  Atria The left atrium is moderately dilated. The right atrium is moderately dilated. There is no color Doppler evidence of an atrial shunt.  Mitral Valve There is mild (1+) mitral regurgitation.  Tricuspid Valve There is mild to moderate (1-2+) tricuspid regurgitation. The right ventricular systolic pressure is approximated at 24mmHg plus the right atrial pressure.  Aortic Valve There is mild trileaflet aortic sclerosis. There is mild (1+) aortic regurgitation. No aortic stenosis is present.  Pulmonic Valve There is mild (1+) pulmonic valvular regurgitation. There is no pulmonic valvular stenosis.  Vessels The aortic root is normal size. Normal size ascending aorta. Dilation of the inferior vena cava is present with abnormal respiratory variation in diameter.  Pericardium There is no pericardial effusion.  Rhythm The rhythm was atrial fibrillation. ______________________________________________________________________________ MMode/2D Measurements & Calculations IVSd: 0.81 cm LVIDd: 4.8 cm LVIDs: 3.3 cm LVPWd: 0.87 cm IVC diam: 3.4 cm FS: 31.3 % LV mass(C)d: 133.5 grams LV mass(C)dI: 59.8 grams/m2 Ao root diam: 3.2 cm asc Aorta Diam: 3.5 cm LVOT diam: 1.7 cm LVOT area: 2.3 cm2 Ao root diam index Ht(cm/m): 1.9 Ao root diam index BSA (cm/m2): 1.5 Asc Ao diam index BSA (cm/m2): 1.6 Asc Ao diam index Ht(cm/m): 2.0 EF Biplane: 60.2 % LA Volume (BP): 71.2 ml  LA Volume Index (BP): 31.9 ml/m2 RWT: 0.36 TAPSE: 2.2 cm  Time Measurements Aortic HR: 43.0 BPM  Doppler Measurements & Calculations MV E max laz: 126.3 cm/sec AI P1/2t: 691.8 msec LV  V1 max P.6 mmHg LV V1 max: 107.0 cm/sec LV V1 VTI: 27.5 cm CO(LVOT): 2.7 l/min CI(LVOT): 1.2 l/min/m2 SV(LVOT): 62.8 ml SI(LVOT): 28.2 ml/m2  PA acc time: 0.11 sec TR max everett: 235.5 cm/sec TR max P.3 mmHg E/E' av.0 Lateral E/e': 8.5 Medial E/e': 9.4 RV S Everett: 10.6 cm/sec  ______________________________________________________________________________ Report approved by: June Blas 2024 02:48 PM       CT Abdomen Pelvis w Contrast    Result Date: 2024  EXAM: CT ABDOMEN PELVIS W CONTRAST LOCATION: [facility] DATE: 2024 INDICATION: Abdominal pain, vomiting, diarrhea. COMPARISON: CT 2022 TECHNIQUE: CT scan of the abdomen and pelvis was performed following injection of IV contrast. Multiplanar reformats were obtained. Dose reduction techniques were used. CONTRAST: 100mL Isovue 370 FINDINGS: LOWER CHEST: Mild to moderate coronary artery calcification. HEPATOBILIARY: The gallbladder is surgically absent. Mild cirrhotic configuration of the liver. The portal veins are patent and the bile ducts are normal in caliber. There are secondary findings of portal venous hypertension which would include scattered  varicosities and splenomegaly. PANCREAS: Significant atrophy. SPLEEN: Splenomegaly with the spleen measuring approximately 15 cm in greatest longitudinal dimension. ADRENAL GLANDS: Normal. KIDNEYS/BLADDER: Normal. BOWEL: There is thickening seen involving the proximal and mid portions of the sigmoid colon with scattered diverticuli. The above findings would be worrisome for diverticulitis. Just lateral to the mid sigmoid colon, there is an abnormal fluid collection measuring approximately 2.5 x 1.3 x 2.9 cm and this is nonspecific, but could represent an abscess without associated gas. On CT 2022, there was a tiny abnormality at this same location; however, this only measured 1.2 x 0.6 x 1.2 cm. The appendix is not seen and is likely surgically absent or  atrophic. Prior postoperative changes near the EG junction. LYMPH NODES: Mildly prominent right groin lymph node measuring 1.6 x 1.1 cm and differential would include an abscess without associated gas or a seroma/hematoma. This is new since 03/13/2022. VASCULATURE: Moderate to marked calcification with no aneurysmal dilatation. PELVIC ORGANS: Surgically absent. MUSCULOSKELETAL: There is an abnormal well-circumscribed fluid collection with no associated gas seen involving the subcutaneous fat of the mid right buttocks region and this measures approximately 7.8 x 3.0 x 5.9 cm     IMPRESSION: 1.  Changes worrisome for sigmoid diverticulitis with possible adjacent 2.5 x 1.3 x 2.9 cm abscess. 2.  Nonspecific fluid collection in the deep subcutaneous fat over the right buttocks region measuring 7.8 x 3.0 x 5.9 cm and differential would include an abscess without associated gas or seroma/hematoma. 3.  Cirrhotic changes of the liver with associated secondary findings of portal venous hypertension which would include varicosities and splenomegaly. The portal veins are patent. 4.  Mildly prominent right groin lymph node, this may be reactive/inflammatory in nature.       Signed by: Carlos Youngblood MD

## 2024-05-01 NOTE — NURSING NOTE
Is the patient currently in the state of [place]? YES    Visit mode:VIDEO    If the visit is dropped, the patient can be reconnected by: VIDEO VISIT: Text to cell phone:   Telephone Information:   Mobile [phone]       Will anyone else be joining the visit? NO  (If patient encounters technical issues they should call [phone])    How would you like to obtain your AVS? MyChart    Are changes needed to the allergy or medication list? No, Pt stated no changes to allergies, and Pt stated no med changes    Are refills needed on medications prescribed by this physician?     Reason for visit: Consult    Pt is feeling very dizzy.    Nora CRUZF

## 2024-05-04 LAB
MDC_IDC_EPISODE_DTM: NORMAL
MDC_IDC_EPISODE_DURATION: 1 S
MDC_IDC_EPISODE_ID: 1
MDC_IDC_EPISODE_TYPE: NORMAL
MDC_IDC_EPISODE_TYPE_INDUCED: NO
MDC_IDC_LEAD_CONNECTION_STATUS: NORMAL
MDC_IDC_LEAD_IMPLANT_DT: NORMAL
MDC_IDC_LEAD_LOCATION: NORMAL
MDC_IDC_LEAD_LOCATION_DETAIL_1: NORMAL
MDC_IDC_LEAD_MFG: NORMAL
MDC_IDC_LEAD_MODEL: NORMAL
MDC_IDC_LEAD_POLARITY_TYPE: NORMAL
MDC_IDC_LEAD_SERIAL: NORMAL
MDC_IDC_MSMT_BATTERY_DTM: NORMAL
MDC_IDC_MSMT_BATTERY_REMAINING_LONGEVITY: 176 MO
MDC_IDC_MSMT_BATTERY_RRT_TRIGGER: 2.62
MDC_IDC_MSMT_BATTERY_STATUS: NORMAL
MDC_IDC_MSMT_BATTERY_VOLTAGE: 3.22 V
MDC_IDC_MSMT_LEADCHNL_RV_IMPEDANCE_VALUE: 437 OHM
MDC_IDC_MSMT_LEADCHNL_RV_IMPEDANCE_VALUE: 760 OHM
MDC_IDC_MSMT_LEADCHNL_RV_PACING_THRESHOLD_AMPLITUDE: 1 V
MDC_IDC_MSMT_LEADCHNL_RV_PACING_THRESHOLD_PULSEWIDTH: 0.4 MS
MDC_IDC_MSMT_LEADCHNL_RV_SENSING_INTR_AMPL: 12 MV
MDC_IDC_MSMT_LEADCHNL_RV_SENSING_INTR_AMPL: 16.5 MV
MDC_IDC_PG_IMPLANT_DTM: NORMAL
MDC_IDC_PG_MFG: NORMAL
MDC_IDC_PG_MODEL: NORMAL
MDC_IDC_PG_SERIAL: NORMAL
MDC_IDC_PG_TYPE: NORMAL
MDC_IDC_SESS_CLINIC_NAME: NORMAL
MDC_IDC_SESS_DTM: NORMAL
MDC_IDC_SESS_TYPE: NORMAL
MDC_IDC_SET_BRADY_HYSTRATE: NORMAL
MDC_IDC_SET_BRADY_LOWRATE: 60 {BEATS}/MIN
MDC_IDC_SET_BRADY_MAX_SENSOR_RATE: 130 {BEATS}/MIN
MDC_IDC_SET_BRADY_MODE: NORMAL
MDC_IDC_SET_LEADCHNL_RV_PACING_AMPLITUDE: 2 V
MDC_IDC_SET_LEADCHNL_RV_PACING_ANODE_ELECTRODE_1: NORMAL
MDC_IDC_SET_LEADCHNL_RV_PACING_ANODE_LOCATION_1: NORMAL
MDC_IDC_SET_LEADCHNL_RV_PACING_CAPTURE_MODE: NORMAL
MDC_IDC_SET_LEADCHNL_RV_PACING_CATHODE_ELECTRODE_1: NORMAL
MDC_IDC_SET_LEADCHNL_RV_PACING_CATHODE_LOCATION_1: NORMAL
MDC_IDC_SET_LEADCHNL_RV_PACING_POLARITY: NORMAL
MDC_IDC_SET_LEADCHNL_RV_PACING_PULSEWIDTH: 0.4 MS
MDC_IDC_SET_LEADCHNL_RV_SENSING_ANODE_ELECTRODE_1: NORMAL
MDC_IDC_SET_LEADCHNL_RV_SENSING_ANODE_LOCATION_1: NORMAL
MDC_IDC_SET_LEADCHNL_RV_SENSING_CATHODE_ELECTRODE_1: NORMAL
MDC_IDC_SET_LEADCHNL_RV_SENSING_CATHODE_LOCATION_1: NORMAL
MDC_IDC_SET_LEADCHNL_RV_SENSING_POLARITY: NORMAL
MDC_IDC_SET_LEADCHNL_RV_SENSING_SENSITIVITY: 0.9 MV
MDC_IDC_SET_ZONE_DETECTION_INTERVAL: 400 MS
MDC_IDC_SET_ZONE_STATUS: NORMAL
MDC_IDC_SET_ZONE_TYPE: NORMAL
MDC_IDC_SET_ZONE_VENDOR_TYPE: NORMAL
MDC_IDC_STAT_BRADY_DTM_END: NORMAL
MDC_IDC_STAT_BRADY_DTM_START: NORMAL
MDC_IDC_STAT_BRADY_RV_PERCENT_PACED: 77.26 %
MDC_IDC_STAT_EPISODE_RECENT_COUNT: 0
MDC_IDC_STAT_EPISODE_RECENT_COUNT: 0
MDC_IDC_STAT_EPISODE_RECENT_COUNT: 1
MDC_IDC_STAT_EPISODE_RECENT_COUNT_DTM_END: NORMAL
MDC_IDC_STAT_EPISODE_RECENT_COUNT_DTM_START: NORMAL
MDC_IDC_STAT_EPISODE_TOTAL_COUNT: 0
MDC_IDC_STAT_EPISODE_TOTAL_COUNT: 0
MDC_IDC_STAT_EPISODE_TOTAL_COUNT: 1
MDC_IDC_STAT_EPISODE_TOTAL_COUNT_DTM_END: NORMAL
MDC_IDC_STAT_EPISODE_TOTAL_COUNT_DTM_START: NORMAL
MDC_IDC_STAT_EPISODE_TYPE: NORMAL

## 2024-05-07 ENCOUNTER — TELEPHONE (OUTPATIENT)
Dept: INTERNAL MEDICINE | Facility: CLINIC | Age: 84
End: 2024-05-07
Payer: COMMERCIAL

## 2024-05-07 ENCOUNTER — ANTICOAGULATION THERAPY VISIT (OUTPATIENT)
Dept: ANTICOAGULATION | Facility: CLINIC | Age: 84
End: 2024-05-07
Payer: COMMERCIAL

## 2024-05-07 LAB — INR (EXTERNAL): 2 (ref 0.9–1.1)

## 2024-05-07 NOTE — TELEPHONE ENCOUNTER
Request from [facility] for supporting office notes regarding the walker.   I faxed the 4/24/24 notes to [phone]

## 2024-05-07 NOTE — PROGRESS NOTES
ANTICOAGULATION MANAGEMENT     Savanna Rehman 83 year old female is on warfarin with therapeutic INR result. (Goal INR 2.0-3.0)    Recent labs: (last 7 days)     05/07/24  1158   INR 2.0*       ASSESSMENT     Source(s): Chart Review and Home Care/Facility Nurse     Warfarin doses taken: Less warfarin taken than planned which may be affecting INR and missed 5/4/24 dose  Diet: No new diet changes identified  Medication/supplement changes: None noted  New illness, injury, or hospitalization: No  Signs or symptoms of bleeding or clotting: No  Previous result: Therapeutic last visit; previously outside of goal range  Additional findings: None       PLAN     Recommended plan for temporary change(s) affecting INR     Dosing Instructions: Continue your current warfarin dose with next INR in 1 week       Summary  As of 5/7/2024      Full warfarin instructions:  3.75 mg every day   Next INR check:  5/14/2024               Telephone call with [facility] home care nurse who agrees to plan and repeated back plan correctly    Orders given to  Homecare nurse/facility to recheck    Education provided:   Please call back if any changes to your diet, medications or how you've been taking warfarin  Goal range and lab monitoring: goal range and significance of current result    Plan made per [facility] anticoagulation protocol    Negrita Gramajo RN  Anticoagulation Clinic  5/7/2024    _______________________________________________________________________     Anticoagulation Episode Summary       Current INR goal:  2.0-3.0   TTR:  54.1% (11.3 mo)   Target end date:  Indefinite   Send INR reminders to:  [facility]    Indications    Permanent atrial fibrillation (H) [I48.21]  Chronic atrial fibrillation (H) [I48.20]  Long term (current) use of anticoagulants (Resolved) [Z79.01]             Comments:  Home care              Anticoagulation Care Providers       Provider Role Specialty Phone number    Patti Suárez MD  Referring Internal Medicine [phone]

## 2024-05-09 ENCOUNTER — TRANSFERRED RECORDS (OUTPATIENT)
Dept: HEALTH INFORMATION MANAGEMENT | Facility: CLINIC | Age: 84
End: 2024-05-09
Payer: COMMERCIAL

## 2024-05-14 ENCOUNTER — ANTICOAGULATION THERAPY VISIT (OUTPATIENT)
Dept: ANTICOAGULATION | Facility: CLINIC | Age: 84
End: 2024-05-14
Payer: COMMERCIAL

## 2024-05-14 DIAGNOSIS — I48.20 CHRONIC ATRIAL FIBRILLATION (H): ICD-10-CM

## 2024-05-14 DIAGNOSIS — I48.21 PERMANENT ATRIAL FIBRILLATION (H): Primary | ICD-10-CM

## 2024-05-14 LAB — INR (EXTERNAL): 2.4 (ref 0.9–1.1)

## 2024-05-14 NOTE — PROGRESS NOTES
ANTICOAGULATION MANAGEMENT     Savanna Rehman 83 year old female is on warfarin with therapeutic INR result. (Goal INR 2.0-3.0)    Recent labs: (last 7 days)     05/14/24  1240   INR 2.4*       ASSESSMENT     Source(s): Chart Review and Home Care/Facility Nurse     Warfarin doses taken: Warfarin taken as instructed  Diet: No new diet changes identified  Medication/supplement changes: None noted  New illness, injury, or hospitalization: No  Signs or symptoms of bleeding or clotting: No  Previous result: Therapeutic last 2(+) visits  Additional findings: None       PLAN     Recommended plan for no diet, medication or health factor changes affecting INR     Dosing Instructions: Continue your current warfarin dose with next INR in 1 week       Summary  As of 5/14/2024      Full warfarin instructions:  3.75 mg every day   Next INR check:  5/21/2024               Telephone call with Raj home care nurse who verbalizes understanding and agrees to plan    Orders given to  Homecare nurse/facility to recheck    Education provided:   Please call back if any changes to your diet, medications or how you've been taking warfarin    Plan made per [facility] anticoagulation protocol    Roya Sky RN  Anticoagulation Clinic  5/14/2024    _______________________________________________________________________     Anticoagulation Episode Summary       Current INR goal:  2.0-3.0   TTR:  55.5% (11.3 mo)   Target end date:  Indefinite   Send INR reminders to:  NIKKI CHASE    Indications    Permanent atrial fibrillation (H) [I48.21]  Chronic atrial fibrillation (H) [I48.20]  Long term (current) use of anticoagulants (Resolved) [Z79.01]             Comments:  Home care              Anticoagulation Care Providers       Provider Role Specialty Phone number    Patti Suárez MD Referring Internal Medicine [phone]

## 2024-05-21 ENCOUNTER — ANTICOAGULATION THERAPY VISIT (OUTPATIENT)
Dept: ANTICOAGULATION | Facility: CLINIC | Age: 84
End: 2024-05-21
Payer: COMMERCIAL

## 2024-05-21 DIAGNOSIS — I48.20 CHRONIC ATRIAL FIBRILLATION (H): ICD-10-CM

## 2024-05-21 DIAGNOSIS — I48.21 PERMANENT ATRIAL FIBRILLATION (H): Primary | ICD-10-CM

## 2024-05-21 LAB — INR (EXTERNAL): 2.5

## 2024-05-21 NOTE — PROGRESS NOTES
ANTICOAGULATION MANAGEMENT     Savanna Rehman 83 year old female is on warfarin with therapeutic INR result. (Goal INR 2.0-3.0)    Recent labs: (last 7 days)     05/21/24  0000   INR 2.5       ASSESSMENT     Source(s): Chart Review and Home Care/Facility Nurse - Raj from [place] [facility]    Warfarin doses taken: Warfarin taken as instructed  Diet: No new diet changes identified  Medication/supplement changes: None noted  New illness, injury, or hospitalization: No  Signs or symptoms of bleeding or clotting: No  Previous result: Therapeutic last 2(+) visits  Additional findings: None       PLAN     Recommended plan for no diet, medication or health factor changes affecting INR     Dosing Instructions: Continue your current warfarin dose with next INR in 2 weeks       Summary  As of 5/21/2024      Full warfarin instructions:  3.75 mg every day   Next INR check:  6/4/2024               Telephone call with Raj home care nurse who verbalizes understanding and agrees to plan    Orders given to  Homecare nurse/facility to recheck    Education provided:   Please call back if any changes to your diet, medications or how you've been taking warfarin    Plan made per [facility] anticoagulation protocol    Lucia Mai RN  Anticoagulation Clinic  5/21/2024    _______________________________________________________________________     Anticoagulation Episode Summary       Current INR goal:  2.0-3.0   TTR:  55.4% (11.3 mo)   Target end date:  Indefinite   Send INR reminders to:  NIKKI CHASE    Indications    Permanent atrial fibrillation (H) [I48.21]  Chronic atrial fibrillation (H) [I48.20]  Long term (current) use of anticoagulants (Resolved) [Z79.01]             Comments:  Home care              Anticoagulation Care Providers       Provider Role Specialty Phone number    Patti Suárez MD Referring Internal Medicine [phone]

## 2024-05-22 ENCOUNTER — TELEPHONE (OUTPATIENT)
Dept: INTERNAL MEDICINE | Facility: CLINIC | Age: 84
End: 2024-05-22
Payer: COMMERCIAL

## 2024-05-22 DIAGNOSIS — T78.3XXA ANGIOEDEMA, INITIAL ENCOUNTER: ICD-10-CM

## 2024-05-22 NOTE — TELEPHONE ENCOUNTER
[facility] pharmacy calls to place refill order, refill already placed. Waiting for response.    Genoveva HORTON

## 2024-05-23 ENCOUNTER — MEDICAL CORRESPONDENCE (OUTPATIENT)
Dept: HEALTH INFORMATION MANAGEMENT | Facility: CLINIC | Age: 84
End: 2024-05-23

## 2024-05-23 RX ORDER — EPINEPHRINE 0.3 MG/.3ML
INJECTION SUBCUTANEOUS
Qty: 2 EACH | Refills: 0 | Status: SHIPPED | OUTPATIENT
Start: 2024-05-23 | End: 2024-06-13

## 2024-05-28 ENCOUNTER — TELEPHONE (OUTPATIENT)
Dept: INTERNAL MEDICINE | Facility: CLINIC | Age: 84
End: 2024-05-28
Payer: COMMERCIAL

## 2024-05-28 NOTE — TELEPHONE ENCOUNTER
Patient states she had called about getting a letter of medical necessity for getting a new walker.  She uses her walker around her own apartment, [employer] or going out to restaurant or clothes shopping with family.   Her [facility] worker will be coming to see her 5/30 to discuss further. [facility] is trying to get [facility] to pay for a new walker. Old one has worn rubber on wheels and no longer stays locked in place.  Swelling and weight gain is not new, nor her primary concern at this time.  She called to request a letter from MD about the walker.      Scheduled patient to see partner 6/3/24. BENITA Graham R.N.

## 2024-05-28 NOTE — TELEPHONE ENCOUNTER
If concerns of fluid overload with weight gain and increased leg swelling, do recommend patient follow-up in clinic with same-day provider or any available provider.

## 2024-05-28 NOTE — TELEPHONE ENCOUNTER
Patient states she is feeling great and she has been up and moving around quite a bit.  Patient states that she is weighing 241 as of this morning.  Patient weighed 225 lb. At 04/24/24 OV.  05/01/24 weight shows 275 lb.  Patient states that the 275 lb has to be wrong.  Patient  states her legs are swollen and is asking what can be done because she wants to keep moving around.

## 2024-06-03 DIAGNOSIS — I50.9 CONGESTIVE HEART FAILURE, UNSPECIFIED HF CHRONICITY, UNSPECIFIED HEART FAILURE TYPE (H): ICD-10-CM

## 2024-06-04 ENCOUNTER — ANTICOAGULATION THERAPY VISIT (OUTPATIENT)
Dept: ANTICOAGULATION | Facility: CLINIC | Age: 84
End: 2024-06-04
Payer: COMMERCIAL

## 2024-06-04 DIAGNOSIS — I48.21 PERMANENT ATRIAL FIBRILLATION (H): Primary | ICD-10-CM

## 2024-06-04 DIAGNOSIS — I48.20 CHRONIC ATRIAL FIBRILLATION (H): ICD-10-CM

## 2024-06-04 LAB — INR (EXTERNAL): 2.4 (ref 0.9–1.1)

## 2024-06-04 RX ORDER — FUROSEMIDE 20 MG
20 TABLET ORAL DAILY
Qty: 30 TABLET | Refills: 0 | Status: SHIPPED | OUTPATIENT
Start: 2024-06-04 | End: 2024-06-07

## 2024-06-04 NOTE — PROGRESS NOTES
ANTICOAGULATION MANAGEMENT     Savanna Rehman 83 year old female is on warfarin with therapeutic INR result. (Goal INR 2.0-3.0)    Recent labs: (last 7 days)     06/04/24  1254   INR 2.4*       ASSESSMENT     Source(s): Chart Review and Patient/Caregiver Call     Warfarin doses taken: Warfarin taken as instructed  Diet: No new diet changes identified  Medication/supplement changes: None noted  New illness, injury, or hospitalization: No  Signs or symptoms of bleeding or clotting: No  Previous result: Therapeutic last 2(+) visits  Additional findings: None       PLAN     Recommended plan for no diet, medication or health factor changes affecting INR     Dosing Instructions: Continue your current warfarin dose with next INR in 3 weeks       Summary  As of 6/4/2024      Full warfarin instructions:  3.75 mg every day   Next INR check:  6/25/2024               Telephone call with Raj  home care nurse who verbalizes understanding and agrees to plan and who agrees to plan and repeated back plan correctly    Orders given to  Homecare nurse/facility to recheck    Education provided:   Contact [phone]  with any changes, questions or concerns.     Plan made per [facility] anticoagulation protocol    Sanjana Fox RN  Anticoagulation Clinic  6/4/2024    _______________________________________________________________________     Anticoagulation Episode Summary       Current INR goal:  2.0-3.0   TTR:  55.5% (11.3 mo)   Target end date:  Indefinite   Send INR reminders to:  NIKKI CHASE    Indications    Permanent atrial fibrillation (H) [I48.21]  Chronic atrial fibrillation (H) [I48.20]  Long term (current) use of anticoagulants (Resolved) [Z79.01]             Comments:  Home care              Anticoagulation Care Providers       Provider Role Specialty Phone number    Patti Suárez MD Referring Internal Medicine [phone]

## 2024-06-05 NOTE — TELEPHONE ENCOUNTER
Patient calling.  States [facility] has not received the supporting notes for the need of the new walker.    Spoke with Jose Antonio at the [facility] office - [place] location ([phone]).  He states the info was not received.  Asking to have it faxed again to the number below ([phone]).    Office visit notes from 4/24/24 faxed again.

## 2024-06-06 NOTE — TELEPHONE ENCOUNTER
Called and spoke with [facility]. It was confirmed [facility] has received the fax and they have what they need. Patient was called and informed.

## 2024-06-07 ENCOUNTER — LAB (OUTPATIENT)
Dept: LAB | Facility: CLINIC | Age: 84
End: 2024-06-07
Payer: COMMERCIAL

## 2024-06-07 ENCOUNTER — TELEPHONE (OUTPATIENT)
Dept: CARDIOLOGY | Facility: CLINIC | Age: 84
End: 2024-06-07

## 2024-06-07 ENCOUNTER — OFFICE VISIT (OUTPATIENT)
Dept: CARDIOLOGY | Facility: CLINIC | Age: 84
End: 2024-06-07
Attending: INTERNAL MEDICINE
Payer: COMMERCIAL

## 2024-06-07 VITALS
BODY MASS INDEX: 36.63 KG/M2 | WEIGHT: 241.7 LBS | OXYGEN SATURATION: 95 % | HEART RATE: 72 BPM | DIASTOLIC BLOOD PRESSURE: 79 MMHG | SYSTOLIC BLOOD PRESSURE: 145 MMHG | HEIGHT: 68 IN

## 2024-06-07 DIAGNOSIS — I48.21 PERMANENT ATRIAL FIBRILLATION (H): ICD-10-CM

## 2024-06-07 DIAGNOSIS — E66.01 CLASS 2 SEVERE OBESITY DUE TO EXCESS CALORIES WITH SERIOUS COMORBIDITY IN ADULT, UNSPECIFIED BMI (H): ICD-10-CM

## 2024-06-07 DIAGNOSIS — I48.20 CHRONIC ATRIAL FIBRILLATION (H): ICD-10-CM

## 2024-06-07 DIAGNOSIS — N18.31 TYPE 2 DIABETES MELLITUS WITH STAGE 3A CHRONIC KIDNEY DISEASE, WITHOUT LONG-TERM CURRENT USE OF INSULIN (H): Primary | ICD-10-CM

## 2024-06-07 DIAGNOSIS — E11.22 TYPE 2 DIABETES MELLITUS WITH STAGE 3A CHRONIC KIDNEY DISEASE, WITHOUT LONG-TERM CURRENT USE OF INSULIN (H): Primary | ICD-10-CM

## 2024-06-07 DIAGNOSIS — G47.33 OBSTRUCTIVE SLEEP APNEA (ADULT) (PEDIATRIC): ICD-10-CM

## 2024-06-07 DIAGNOSIS — E11.22 TYPE 2 DIABETES MELLITUS WITH STAGE 3A CHRONIC KIDNEY DISEASE, WITHOUT LONG-TERM CURRENT USE OF INSULIN (H): ICD-10-CM

## 2024-06-07 DIAGNOSIS — E66.812 CLASS 2 SEVERE OBESITY DUE TO EXCESS CALORIES WITH SERIOUS COMORBIDITY IN ADULT, UNSPECIFIED BMI (H): ICD-10-CM

## 2024-06-07 DIAGNOSIS — I77.810 ASCENDING AORTA DILATATION (H): ICD-10-CM

## 2024-06-07 DIAGNOSIS — I50.9 CONGESTIVE HEART FAILURE, UNSPECIFIED HF CHRONICITY, UNSPECIFIED HEART FAILURE TYPE (H): ICD-10-CM

## 2024-06-07 DIAGNOSIS — N18.31 TYPE 2 DIABETES MELLITUS WITH STAGE 3A CHRONIC KIDNEY DISEASE, WITHOUT LONG-TERM CURRENT USE OF INSULIN (H): ICD-10-CM

## 2024-06-07 LAB
ANION GAP SERPL CALCULATED.3IONS-SCNC: 12 MMOL/L (ref 7–15)
BUN SERPL-MCNC: 17.4 MG/DL (ref 8–23)
CALCIUM SERPL-MCNC: 9.5 MG/DL (ref 8.8–10.2)
CHLORIDE SERPL-SCNC: 100 MMOL/L (ref 98–107)
CREAT SERPL-MCNC: 1.11 MG/DL (ref 0.51–0.95)
DEPRECATED HCO3 PLAS-SCNC: 27 MMOL/L (ref 22–29)
EGFRCR SERPLBLD CKD-EPI 2021: 50 ML/MIN/1.73M2
GLUCOSE SERPL-MCNC: 128 MG/DL (ref 70–99)
NT-PROBNP SERPL-MCNC: 767 PG/ML (ref 0–1800)
POTASSIUM SERPL-SCNC: 4.4 MMOL/L (ref 3.4–5.3)
SODIUM SERPL-SCNC: 139 MMOL/L (ref 135–145)

## 2024-06-07 PROCEDURE — 99214 OFFICE O/P EST MOD 30 MIN: CPT | Mod: 24 | Performed by: INTERNAL MEDICINE

## 2024-06-07 PROCEDURE — 80048 BASIC METABOLIC PNL TOTAL CA: CPT | Performed by: INTERNAL MEDICINE

## 2024-06-07 PROCEDURE — 83880 ASSAY OF NATRIURETIC PEPTIDE: CPT | Performed by: INTERNAL MEDICINE

## 2024-06-07 PROCEDURE — G2211 COMPLEX E/M VISIT ADD ON: HCPCS | Performed by: INTERNAL MEDICINE

## 2024-06-07 PROCEDURE — 36415 COLL VENOUS BLD VENIPUNCTURE: CPT | Performed by: INTERNAL MEDICINE

## 2024-06-07 RX ORDER — FUROSEMIDE 40 MG
40 TABLET ORAL DAILY
Qty: 30 TABLET | Refills: 11 | Status: SHIPPED | OUTPATIENT
Start: 2024-06-07 | End: 2024-06-28 | Stop reason: DRUGHIGH

## 2024-06-07 NOTE — TELEPHONE ENCOUNTER
Received call from Raj and will fax updated medication list from [facility] Fax number [phone]   Dr. Waller saw the patient today and was asking for an updated medication list.  Left message for Raj [phone] to call back with updated medication list and also Dr. Waller did increase her Furosemide from 20mg to 40mg.  Will await call back and update Dr. Waller with medication list.

## 2024-06-07 NOTE — LETTER
6/7/2024    Taylor Payan MD  [address] [facility] [address]    RE: Savanna Sosakarie       Dear Colleague,     I had the pleasure of seeing Savanna Rehman in the [facility].  HPI and Plan:   Savanna Rehman is a 83 year old female who presents with history of permanent atrial fibrillation with a recent pacemaker implant in April, type 2 diabetes, morbid obesity, nonobstructive coronary disease and sleep apnea.  She is a patient of Dr. Mohan Kenny. She was hospitalized in April for diverticulosis and was noted to have atrial fibrillation with bradycardia arrhythmia with significant pauses therefore was referred to EP for permanent pacemaker implant.  She did have complication of severe rash and swelling of her upper chest jaw and hands following the implantation, apparently she was given lidocaine for local anesthetic and she is allergic to it.  She did recover from this and her incision site looks like it is healed well although she is uncomfortable with that the way it pokes out a little bit, she does not have much subcutaneous tissue there.  It does look like it is healed well though and she has no induration or erythema in the area.  She is also suffering from lower extremity swelling and feels like she is gained about 9 pounds of fluid.  She denies symptoms suggestive of orthopnea or PND but does note increased lower extremity swelling..  In talking with her it sounds like she does have cirrhosis of the liver and it may be from alcoholism, she is scheduled to see a GI specialist in August to have an EGD to evaluate for varices.  She had a pacer interrogation April 30 which showed normal thresholds and good battery life with 77% ventricular pacing.  Labs drawn April 15 showed mild anemia hemoglobin of 11.6 platelets of 116 renal insufficiency with a creatinine of 1.29 but normal electrolytes.  On exam today she is mildly hypertensive and her weight is measured at 241 which is up  from 225 upon her last visit April 24.  She has 2-3+ pitting edema to the lower extremities left greater than right, cardiovascular tones are regular and lung fields are clear today.  An echocardiogram was done in April which showed a normal LV systolic function with a EF estimated at 55 to 60%, normal RV function with mild mitral and aortic insufficiency and mild to moderate tricuspid insufficiency.  Estimated pulmonary pressures were within normal range.    Summary    1.HFpEF-mostly compensated but with significant weight gain and evidence of fluid retention in the lower extremities.  This may be combination of cirrhotic liver and dependent edema as well.  Right now I am unclear as to what her medications are she states she is taking 3 diuretics but there is only one listed here furosemide 20 mg daily.  I will contact her nurse who sets up her medications for more accurate list.  I would like to increase her Lasix from 20 mg to 40 mg and changed her prescription to reflect this increase.  She will need to increase the potassium in her diet.  I will have her follow-up in clinic within a few weeks to reassess fluid balance and we will also check her electrolytes.  I have ordered basic metabolic panel and NT BNP today as well.    2.  Permanent atrial fibrillation-she is on warfarin and now has a permanent pacemaker device due to symptomatic bradycardia.  She has mild anemia and thrombocytopenia possibly related to underlying liver disease.  No evidence of acute blood loss    3.  Permanent pacemaker implant-she had an allergic reaction to lidocaine that was used as a local anesthetic but this is resolved.  Pacemaker does jot out a little bit but it seems to be functioning well and there is no evidence of infection, induration or erythema.  She needs to continue to follow in device clinic    Please feel free to contact me with any questions given regards to her care         Today's clinic visit entailed:  Review of  external notes as documented elsewhere in note  Review of the result(s) of each unique test - BMP, Echo, pacer implant, device check  Ordering of each unique test  Prescription drug management    Provider  Link to [facility] Help Grid     The level of medical decision making during this visit was of moderate complexity.  The longitudinal plan of care for the diagnosis(es)/condition(s) as documented were addressed during this visit. Due to the added complexity in care, I will continue to support Savanna in the subsequent management and with ongoing continuity of care.     Orders Placed This Encounter   Procedures    Basic metabolic panel    N terminal pro BNP outpatient    Basic metabolic panel    Follow-Up with Cardiology MELANI     Orders Placed This Encounter   Medications    furosemide (LASIX) 40 MG tablet     Sig: Take 1 tablet (40 mg) by mouth daily     Dispense:  30 tablet     Refill:  11     Medications Discontinued During This Encounter   Medication Reason    furosemide (LASIX) 20 MG tablet Reorder (No AVS)         Encounter Diagnoses   Name Primary?    Ascending aorta dilatation (H24)     Chronic atrial fibrillation (H)     Type 2 diabetes mellitus with stage 3a chronic kidney disease, without long-term current use of insulin (H) Yes    Obstructive sleep apnea (adult) (pediatric)     Permanent atrial fibrillation (H)     Congestive heart failure, unspecified HF chronicity, unspecified heart failure type (H)     Class 2 severe obesity due to excess calories with serious comorbidity in adult, unspecified BMI (H)        CURRENT MEDICATIONS:  Current Outpatient Medications   Medication Sig Dispense Refill    acetaminophen (TYLENOL) 500 MG tablet Take 1,000 mg by mouth every 6 hours as needed for mild pain      Cholecalciferol (VITAMIN D-3) 125 MCG (5000 UT) TABS Take 5,000 Units by mouth daily      EPINEPHrine (ANY BX GENERIC EQUIV) 0.3 MG/0.3ML injection 2-pack INJECT 0.3 MLS (0.3MG) INTO THE MUSCLE ONCE AS NEEDED FOR  ANAPHYLAXIS 2 each 0    escitalopram (LEXAPRO) 10 MG tablet Take 1 tablet (10 mg) by mouth at bedtime 90 tablet 1    famotidine (PEPCID) 20 MG tablet Take 20 mg by mouth 2 times daily      furosemide (LASIX) 40 MG tablet Take 1 tablet (40 mg) by mouth daily 30 tablet 11    glipiZIDE (GLUCOTROL XL) 2.5 MG 24 hr tablet Take 1 tablet by mouth twice daily 180 tablet 0    indapamide (LOZOL) 2.5 MG tablet Take 1 tablet (2.5 mg) by mouth every morning 90 tablet 1    loperamide (IMODIUM) 2 MG capsule TAKE 1 CAPSULE BY MOUTH 4 TIMES DAILY AS NEEDED FOR DIARRHEA 60 capsule 0    melatonin 3 MG tablet Take 3 mg by mouth nightly as needed for sleep      warfarin ANTICOAGULANT (COUMADIN) 2.5 MG tablet Take 3.75 mg by mouth six times a week Daily except on Mondays      warfarin ANTICOAGULANT (COUMADIN) 2.5 MG tablet Take 2.5 mg by mouth once a week On Monday      Warfarin Sodium (COUMADIN PO) Take by mouth daily   4/12/24: INR 1.6 today  Take coumadin 7.5 mg on 4/12 and 4/13 and then 5mg on 4/14 and 4/15 then check INR on 4/16 4/16/2024 INR 2.2 give 3.5mg coumadin daily and check INR on 4/19 4/19/2024:  INR IS 2.1  RESUME HOME REGIMEN OF COUMADIN  as listed         ALLERGIES     Allergies   Allergen Reactions    Augmented Betamethasone Diprop [Betamethasone] Other (See Comments)     Severe yeast infection    Petrolatum Anaphylaxis and Swelling     Rash and swelling    Shellfish-Derived Products Anaphylaxis     Tongue swelling    Aspirin Swelling     tiongue swelling    Bacitracin      Rash swelling    Bactrim [Sulfamethoxazole-Trimethoprim] Dizziness    Coumadin [Warfarin] Swelling     Leg swelling    Darvon [Propoxyphene] Swelling     Throat closes    Dilaudid [Hydromorphone]      No side effects from fentanyl June 2023    Levaquin [Levofloxacin] Swelling     Tongue swelling    Lidocaine      Facial swelling     Morphine Unknown     [facility] 2/23[facility]24    Neomycin Swelling     rash    Neosporin  [Neomycin-Polymyxin-Gramicidin] Swelling     rash    Nitrofurantoin      SOB, GI upset,    Oxycodone      Severe itching    Percocet [Oxycodone-Acetaminophen] Unknown    Percodan [Oxycodone-Aspirin]      Severe itching    Polymyxin B     Pramoxine     Tramadol     Vicodin [Hydrocodone-Acetaminophen]      Severe itching      Xarelto [Rivaroxaban]     Adhesive Tape Rash     Band aids     Codeine Rash    Hydrocortisone Rash and Swelling    Other Environmental Allergy Rash     Adhesive tape   Band aids        PAST MEDICAL HISTORY:  Past Medical History:   Diagnosis Date    Acute encephalopathy 09/21/2023    Anemia     Iron Deficiency anemia    Atrial fibrillation (H)     CAD (coronary artery disease)     non-obstructive    Chronic pain     neck, low back, legs    Congestive heart failure (H)     Degenerative disk disease     Diabetes (H)     Fibromyalgia     Gastro-oesophageal reflux disease     Gout     Hiatal hernia     Mumps     Neuropathy     CRISTIANA (obstructive sleep apnea) - CPAP     Palpitations     Pernicious anemia     Sleep apnea     uses CPAP.    Urinary incontinence     Vitamin D deficiency        PAST SURGICAL HISTORY:  Past Surgical History:   Procedure Laterality Date    APPENDECTOMY      CHOLECYSTECTOMY      COLONOSCOPY  3/15/2011    COLONOSCOPY N/A 3/15/2023    Procedure: COLONOSCOPY, WITH POLYPECTOMY AND BIOPSY;  Surgeon: Maci Coronel MD;  Location:  GI    COLONOSCOPY N/A 3/15/2023    Procedure: COLONOSCOPY, FLEXIBLE, WITH LESION REMOVAL USING SNARE;  Surgeon: Maci Coronel MD;  Location:  GI    CORONARY ANGIOGRAPHY ADULT ORDER      CYSTOSCOPY, BIOPSY BLADDER, COMBINED N/A 7/13/2020    Procedure: CYSTOSCOPY, WITH BLADDER BIOPSY;  Surgeon: Deisy Wilson MD;  Location: [facility] OR    EP PACEMAKER DEVICE & LEAD IMPLANT- RIGHT ATRIAL & RIGHT VENTRICULAR Left 4/5/2024    Procedure: Pacemaker Device & Lead Implant - Right Atrial & Right Ventricular;  Surgeon: Raul Plunkett MD;  Location:   HEART CARDIAC CATH LAB    HEART CATH LEFT HEART CATH  16    medication management    HYSTERECTOMY TOTAL ABDOMINAL      Knee replacement NOS Left     LAPAROSCOPIC NISSEN FUNDOPLICATION N/A 2015    Procedure: LAPAROSCOPIC NISSEN FUNDOPLICATION;  Surgeon: Armando Ansari MD;  Location: [facility] OR    TONSILLECTOMY      TRANSPOSITION ULNAR NERVE (ELBOW)         FAMILY HISTORY:  Family History   Problem Relation Age of Onset    Alzheimer Disease Mother     Lung Cancer Father     No Known Problems Brother     No Known Problems Brother     No Known Problems Brother     Unknown/Adopted No family hx of        SOCIAL HISTORY:  Social History     Socioeconomic History    Marital status:      Spouse name: None    Number of children: None    Years of education: None    Highest education level: None   Tobacco Use    Smoking status: Former     Current packs/day: 0.00     Average packs/day: 0.5 packs/day for 50.0 years (25.0 ttl pk-yrs)     Types: Cigarettes     Start date: 1964     Quit date: 2014     Years since quittin.7     Passive exposure: Past    Smokeless tobacco: Never   Vaping Use    Vaping status: Never Used   Substance and Sexual Activity    Alcohol use: Yes     Comment: couple a month    Drug use: Never    Sexual activity: Not Currently     Social Determinants of Health     Financial Resource Strain: Low Risk  (2024)    Financial Resource Strain     Within the past 12 months, have you or your family members you live with been unable to get utilities (heat, electricity) when it was really needed?: No   Food Insecurity: Low Risk  (2024)    Food Insecurity     Within the past 12 months, did you worry that your food would run out before you got money to buy more?: No     Within the past 12 months, did the food you bought just not last and you didn t have money to get more?: No   Transportation Needs: High Risk (2024)    Transportation Needs     Within the past 12 months, has lack of  "transportation kept you from medical appointments, getting your medicines, non-medical meetings or appointments, work, or from getting things that you need?: Yes   Stress: Stress Concern Present (1/27/2022)    [employer] - Occupational Stress Questionnaire     Feeling of Stress : Very much    Received from [facility], [facility]    Social Connections   Interpersonal Safety: Low Risk  (4/24/2024)    Interpersonal Safety     Do you feel physically and emotionally safe where you currently live?: Yes     Within the past 12 months, have you been hit, slapped, kicked or otherwise physically hurt by someone?: No     Within the past 12 months, have you been humiliated or emotionally abused in other ways by your partner or ex-partner?: No   Housing Stability: Low Risk  (2/1/2024)    Housing Stability     Do you have housing? : Yes     Are you worried about losing your housing?: No       Review of Systems:  Skin:  not assessed     Eyes:  Positive for color blindness  ENT:  not assessed    Respiratory:  Positive for sleep apnea;CPAP;dyspnea on exertion  Cardiovascular:    edema;Positive for;lightheadedness;dizziness  Gastroenterology: not assessed    Genitourinary:  not assessed    Musculoskeletal:  not assessed    Neurologic:  not assessed    Psychiatric:  not assessed    Heme/Lymph/Imm:  not assessed    Endocrine:  not assessed      Physical Exam:  Vitals: BP (!) 145/79 (BP Location: Right arm, Patient Position: Sitting, Cuff Size: Adult Large)   Pulse 72   Ht 1.727 m (5' 8\")   Wt 109.6 kg (241 lb 11.2 oz)   LMP  (LMP Unknown)   SpO2 95%   BMI 36.75 kg/m      Constitutional:           Skin:             Head:           Eyes:           Lymph:      ENT:           Neck:           Respiratory:            Cardiac:                                                           GI:           Extremities and Muscular Skeletal:           "       Neurological:           Psych:           Recent Lab Results:  LIPID RESULTS:  Lab Results   Component Value Date    CHOL 136 10/19/2023    CHOL 169 02/22/2021    HDL 42 (L) 10/19/2023    HDL 40 (L) 02/22/2021    LDL 78 10/19/2023     (H) 02/22/2021    TRIG 79 10/19/2023    TRIG 110 02/22/2021       LIVER ENZYME RESULTS:  Lab Results   Component Value Date    AST 41 04/02/2024    AST 32 02/22/2021    ALT 20 04/02/2024    ALT 38 02/22/2021       CBC RESULTS:  Lab Results   Component Value Date    WBC 4.1 04/15/2024    WBC 6.1 02/22/2021    RBC 3.76 (L) 04/15/2024    RBC 4.89 02/22/2021    HGB 11.6 (L) 04/15/2024    HGB 14.7 02/22/2021    HCT 35.1 04/15/2024    HCT 44.5 02/22/2021    MCV 93 04/15/2024    MCV 91 02/22/2021    MCH 30.9 04/15/2024    MCH 30.1 02/22/2021    MCHC 33.0 04/15/2024    MCHC 33.0 02/22/2021    RDW 14.8 04/15/2024    RDW 14.2 02/22/2021     (L) 04/15/2024     02/22/2021       BMP RESULTS:  Lab Results   Component Value Date     04/15/2024     02/22/2021    POTASSIUM 4.3 04/15/2024    POTASSIUM 4.6 11/07/2022    POTASSIUM 4.2 02/22/2021    CHLORIDE 99 04/15/2024    CHLORIDE 105 11/07/2022    CHLORIDE 103 02/22/2021    CO2 30 (H) 04/15/2024    CO2 29 11/07/2022    CO2 29 02/22/2021    ANIONGAP 10 04/15/2024    ANIONGAP 6 11/07/2022    ANIONGAP 6 02/22/2021    GLC 92 04/15/2024     (H) 04/08/2024     (H) 11/07/2022     (H) 02/22/2021    BUN 22.1 04/15/2024    BUN 17 11/07/2022    BUN 14 02/22/2021    CR 1.29 (H) 04/15/2024    CR 0.97 02/22/2021    GFRESTIMATED 41 (L) 04/15/2024    GFRESTIMATED 51 (L) 03/13/2022    GFRESTIMATED 56 (L) 02/22/2021    GFRESTBLACK 64 02/22/2021    SAUL 9.0 04/15/2024    SAUL 9.9 02/22/2021        A1C RESULTS:  Lab Results   Component Value Date    A1C 6.1 (H) 02/08/2024    A1C 6.3 (H) 02/22/2021       INR RESULTS:  Lab Results   Component Value Date    INR 2.4 (A) 06/04/2024    INR 2.5 05/21/2024    INR 2.1  07/21/2021    INR 2.3 (A) 07/06/2021           CC  Taylor Payan MD  [address] [place] [address]    Thank you for allowing me to participate in the care of your patient.      Sincerely,     Aniyah Waller DO     [facility]

## 2024-06-07 NOTE — PROGRESS NOTES
HPI and Plan:   Savanna Rehman is a 83 year old female who presents with history of permanent atrial fibrillation with a recent pacemaker implant in April, type 2 diabetes, morbid obesity, nonobstructive coronary disease and sleep apnea.  She is a patient of Dr. Mohan Kenny. She was hospitalized in April for diverticulosis and was noted to have atrial fibrillation with bradycardia arrhythmia with significant pauses therefore was referred to EP for permanent pacemaker implant.  She did have complication of severe rash and swelling of her upper chest jaw and hands following the implantation, apparently she was given lidocaine for local anesthetic and she is allergic to it.  She did recover from this and her incision site looks like it is healed well although she is uncomfortable with that the way it pokes out a little bit, she does not have much subcutaneous tissue there.  It does look like it is healed well though and she has no induration or erythema in the area.  She is also suffering from lower extremity swelling and feels like she is gained about 9 pounds of fluid.  She denies symptoms suggestive of orthopnea or PND but does note increased lower extremity swelling..  In talking with her it sounds like she does have cirrhosis of the liver and it may be from alcoholism, she is scheduled to see a GI specialist in August to have an EGD to evaluate for varices.  She had a pacer interrogation April 30 which showed normal thresholds and good battery life with 77% ventricular pacing.  Labs drawn April 15 showed mild anemia hemoglobin of 11.6 platelets of 116 renal insufficiency with a creatinine of 1.29 but normal electrolytes.  On exam today she is mildly hypertensive and her weight is measured at 241 which is up from 225 upon her last visit April 24.  She has 2-3+ pitting edema to the lower extremities left greater than right, cardiovascular tones are regular and lung fields are clear today.  An echocardiogram was done  in April which showed a normal LV systolic function with a EF estimated at 55 to 60%, normal RV function with mild mitral and aortic insufficiency and mild to moderate tricuspid insufficiency.  Estimated pulmonary pressures were within normal range.    Summary    1.HFpEF-mostly compensated but with significant weight gain and evidence of fluid retention in the lower extremities.  This may be combination of cirrhotic liver and dependent edema as well.  Right now I am unclear as to what her medications are she states she is taking 3 diuretics but there is only one listed here furosemide 20 mg daily.  I will contact her nurse who sets up her medications for more accurate list.  I would like to increase her Lasix from 20 mg to 40 mg and changed her prescription to reflect this increase.  She will need to increase the potassium in her diet.  I will have her follow-up in clinic within a few weeks to reassess fluid balance and we will also check her electrolytes.  I have ordered basic metabolic panel and NT BNP today as well.    2.  Permanent atrial fibrillation-she is on warfarin and now has a permanent pacemaker device due to symptomatic bradycardia.  She has mild anemia and thrombocytopenia possibly related to underlying liver disease.  No evidence of acute blood loss    3.  Permanent pacemaker implant-she had an allergic reaction to lidocaine that was used as a local anesthetic but this is resolved.  Pacemaker does jot out a little bit but it seems to be functioning well and there is no evidence of infection, induration or erythema.  She needs to continue to follow in device clinic    Please feel free to contact me with any questions given regards to her care         Today's clinic visit entailed:  Review of external notes as documented elsewhere in note  Review of the result(s) of each unique test - BMP, Echo, pacer implant, device check  Ordering of each unique test  Prescription drug management    Provider  Link to  MDM Help Grid     The level of medical decision making during this visit was of moderate complexity.  The longitudinal plan of care for the diagnosis(es)/condition(s) as documented were addressed during this visit. Due to the added complexity in care, I will continue to support Savanna in the subsequent management and with ongoing continuity of care.     Orders Placed This Encounter   Procedures    Basic metabolic panel    N terminal pro BNP outpatient    Basic metabolic panel    Follow-Up with Cardiology MELANI     Orders Placed This Encounter   Medications    furosemide (LASIX) 40 MG tablet     Sig: Take 1 tablet (40 mg) by mouth daily     Dispense:  30 tablet     Refill:  11     Medications Discontinued During This Encounter   Medication Reason    furosemide (LASIX) 20 MG tablet Reorder (No AVS)         Encounter Diagnoses   Name Primary?    Ascending aorta dilatation (H24)     Chronic atrial fibrillation (H)     Type 2 diabetes mellitus with stage 3a chronic kidney disease, without long-term current use of insulin (H) Yes    Obstructive sleep apnea (adult) (pediatric)     Permanent atrial fibrillation (H)     Congestive heart failure, unspecified HF chronicity, unspecified heart failure type (H)     Class 2 severe obesity due to excess calories with serious comorbidity in adult, unspecified BMI (H)        CURRENT MEDICATIONS:  Current Outpatient Medications   Medication Sig Dispense Refill    acetaminophen (TYLENOL) 500 MG tablet Take 1,000 mg by mouth every 6 hours as needed for mild pain      Cholecalciferol (VITAMIN D-3) 125 MCG (5000 UT) TABS Take 5,000 Units by mouth daily      EPINEPHrine (ANY BX GENERIC EQUIV) 0.3 MG/0.3ML injection 2-pack INJECT 0.3 MLS (0.3MG) INTO THE MUSCLE ONCE AS NEEDED FOR ANAPHYLAXIS 2 each 0    escitalopram (LEXAPRO) 10 MG tablet Take 1 tablet (10 mg) by mouth at bedtime 90 tablet 1    famotidine (PEPCID) 20 MG tablet Take 20 mg by mouth 2 times daily      furosemide (LASIX) 40 MG  tablet Take 1 tablet (40 mg) by mouth daily 30 tablet 11    glipiZIDE (GLUCOTROL XL) 2.5 MG 24 hr tablet Take 1 tablet by mouth twice daily 180 tablet 0    indapamide (LOZOL) 2.5 MG tablet Take 1 tablet (2.5 mg) by mouth every morning 90 tablet 1    loperamide (IMODIUM) 2 MG capsule TAKE 1 CAPSULE BY MOUTH 4 TIMES DAILY AS NEEDED FOR DIARRHEA 60 capsule 0    melatonin 3 MG tablet Take 3 mg by mouth nightly as needed for sleep      warfarin ANTICOAGULANT (COUMADIN) 2.5 MG tablet Take 3.75 mg by mouth six times a week Daily except on Mondays      warfarin ANTICOAGULANT (COUMADIN) 2.5 MG tablet Take 2.5 mg by mouth once a week On Monday      Warfarin Sodium (COUMADIN PO) Take by mouth daily   4/12/24: INR 1.6 today  Take coumadin 7.5 mg on 4/12 and 4/13 and then 5mg on 4/14 and 4/15 then check INR on 4/16 4/16/2024 INR 2.2 give 3.5mg coumadin daily and check INR on 4/19 4/19/2024:  INR IS 2.1  RESUME HOME REGIMEN OF COUMADIN  as listed         ALLERGIES     Allergies   Allergen Reactions    Augmented Betamethasone Diprop [Betamethasone] Other (See Comments)     Severe yeast infection    Petrolatum Anaphylaxis and Swelling     Rash and swelling    Shellfish-Derived Products Anaphylaxis     Tongue swelling    Aspirin Swelling     tiongue swelling    Bacitracin      Rash swelling    Bactrim [Sulfamethoxazole-Trimethoprim] Dizziness    Coumadin [Warfarin] Swelling     Leg swelling    Darvon [Propoxyphene] Swelling     Throat closes    Dilaudid [Hydromorphone]      No side effects from fentanyl June 2023    Levaquin [Levofloxacin] Swelling     Tongue swelling    Lidocaine      Facial swelling     Morphine Unknown     [facility] 2/23[facility]24    Neomycin Swelling     rash    Neosporin [Neomycin-Polymyxin-Gramicidin] Swelling     rash    Nitrofurantoin      SOB, GI upset,    Oxycodone      Severe itching    Percocet [Oxycodone-Acetaminophen] Unknown    Percodan [Oxycodone-Aspirin]      Severe itching    Polymyxin  B     Pramoxine     Tramadol     Vicodin [Hydrocodone-Acetaminophen]      Severe itching      Xarelto [Rivaroxaban]     Adhesive Tape Rash     Band aids     Codeine Rash    Hydrocortisone Rash and Swelling    Other Environmental Allergy Rash     Adhesive tape   Band aids        PAST MEDICAL HISTORY:  Past Medical History:   Diagnosis Date    Acute encephalopathy 09/21/2023    Anemia     Iron Deficiency anemia    Atrial fibrillation (H)     CAD (coronary artery disease)     non-obstructive    Chronic pain     neck, low back, legs    Congestive heart failure (H)     Degenerative disk disease     Diabetes (H)     Fibromyalgia     Gastro-oesophageal reflux disease     Gout     Hiatal hernia     Mumps     Neuropathy     CRISTIANA (obstructive sleep apnea) - CPAP     Palpitations     Pernicious anemia     Sleep apnea     uses CPAP.    Urinary incontinence     Vitamin D deficiency        PAST SURGICAL HISTORY:  Past Surgical History:   Procedure Laterality Date    APPENDECTOMY      CHOLECYSTECTOMY      COLONOSCOPY  3/15/2011    COLONOSCOPY N/A 3/15/2023    Procedure: COLONOSCOPY, WITH POLYPECTOMY AND BIOPSY;  Surgeon: Maci Coronel MD;  Location:  GI    COLONOSCOPY N/A 3/15/2023    Procedure: COLONOSCOPY, FLEXIBLE, WITH LESION REMOVAL USING SNARE;  Surgeon: Maci Coronel MD;  Location:  GI    CORONARY ANGIOGRAPHY ADULT ORDER      CYSTOSCOPY, BIOPSY BLADDER, COMBINED N/A 7/13/2020    Procedure: CYSTOSCOPY, WITH BLADDER BIOPSY;  Surgeon: Deisy Wilson MD;  Location: [facility] OR    EP PACEMAKER DEVICE & LEAD IMPLANT- RIGHT ATRIAL & RIGHT VENTRICULAR Left 4/5/2024    Procedure: Pacemaker Device & Lead Implant - Right Atrial & Right Ventricular;  Surgeon: Raul Plunkett MD;  Location:  HEART CARDIAC CATH LAB    HEART CATH LEFT HEART CATH  12/30/16    medication management    HYSTERECTOMY TOTAL ABDOMINAL      Knee replacement NOS Left     LAPAROSCOPIC NISSEN FUNDOPLICATION N/A 2/4/2015    Procedure: LAPAROSCOPIC  NISSEN FUNDOPLICATION;  Surgeon: Armando Ansari MD;  Location: [facility] OR    TONSILLECTOMY      TRANSPOSITION ULNAR NERVE (ELBOW)         FAMILY HISTORY:  Family History   Problem Relation Age of Onset    Alzheimer Disease Mother     Lung Cancer Father     No Known Problems Brother     No Known Problems Brother     No Known Problems Brother     Unknown/Adopted No family hx of        SOCIAL HISTORY:  Social History     Socioeconomic History    Marital status:      Spouse name: None    Number of children: None    Years of education: None    Highest education level: None   Tobacco Use    Smoking status: Former     Current packs/day: 0.00     Average packs/day: 0.5 packs/day for 50.0 years (25.0 ttl pk-yrs)     Types: Cigarettes     Start date: 1964     Quit date: 2014     Years since quittin.7     Passive exposure: Past    Smokeless tobacco: Never   Vaping Use    Vaping status: Never Used   Substance and Sexual Activity    Alcohol use: Yes     Comment: couple a month    Drug use: Never    Sexual activity: Not Currently     Social Determinants of Health     Financial Resource Strain: Low Risk  (2024)    Financial Resource Strain     Within the past 12 months, have you or your family members you live with been unable to get utilities (heat, electricity) when it was really needed?: No   Food Insecurity: Low Risk  (2024)    Food Insecurity     Within the past 12 months, did you worry that your food would run out before you got money to buy more?: No     Within the past 12 months, did the food you bought just not last and you didn t have money to get more?: No   Transportation Needs: High Risk (2024)    Transportation Needs     Within the past 12 months, has lack of transportation kept you from medical appointments, getting your medicines, non-medical meetings or appointments, work, or from getting things that you need?: Yes   Stress: Stress Concern Present (2022)    [place] [employer]  "Occupational Health - Occupational Stress Questionnaire     Feeling of Stress : Very much    Received from [facility], [facility]    Social Connections   Interpersonal Safety: Low Risk  (4/24/2024)    Interpersonal Safety     Do you feel physically and emotionally safe where you currently live?: Yes     Within the past 12 months, have you been hit, slapped, kicked or otherwise physically hurt by someone?: No     Within the past 12 months, have you been humiliated or emotionally abused in other ways by your partner or ex-partner?: No   Housing Stability: Low Risk  (2/1/2024)    Housing Stability     Do you have housing? : Yes     Are you worried about losing your housing?: No       Review of Systems:  Skin:  not assessed     Eyes:  Positive for color blindness  ENT:  not assessed    Respiratory:  Positive for sleep apnea;CPAP;dyspnea on exertion  Cardiovascular:    edema;Positive for;lightheadedness;dizziness  Gastroenterology: not assessed    Genitourinary:  not assessed    Musculoskeletal:  not assessed    Neurologic:  not assessed    Psychiatric:  not assessed    Heme/Lymph/Imm:  not assessed    Endocrine:  not assessed      Physical Exam:  Vitals: BP (!) 145/79 (BP Location: Right arm, Patient Position: Sitting, Cuff Size: Adult Large)   Pulse 72   Ht 1.727 m (5' 8\")   Wt 109.6 kg (241 lb 11.2 oz)   LMP  (LMP Unknown)   SpO2 95%   BMI 36.75 kg/m      Constitutional:           Skin:             Head:           Eyes:           Lymph:      ENT:           Neck:           Respiratory:            Cardiac:                                                           GI:           Extremities and Muscular Skeletal:                 Neurological:           Psych:           Recent Lab Results:  LIPID RESULTS:  Lab Results   Component Value Date    CHOL 136 10/19/2023    CHOL 169 02/22/2021    HDL 42 (L) 10/19/2023    HDL 40 (L) 02/22/2021    LDL 78 " 10/19/2023     (H) 02/22/2021    TRIG 79 10/19/2023    TRIG 110 02/22/2021       LIVER ENZYME RESULTS:  Lab Results   Component Value Date    AST 41 04/02/2024    AST 32 02/22/2021    ALT 20 04/02/2024    ALT 38 02/22/2021       CBC RESULTS:  Lab Results   Component Value Date    WBC 4.1 04/15/2024    WBC 6.1 02/22/2021    RBC 3.76 (L) 04/15/2024    RBC 4.89 02/22/2021    HGB 11.6 (L) 04/15/2024    HGB 14.7 02/22/2021    HCT 35.1 04/15/2024    HCT 44.5 02/22/2021    MCV 93 04/15/2024    MCV 91 02/22/2021    MCH 30.9 04/15/2024    MCH 30.1 02/22/2021    MCHC 33.0 04/15/2024    MCHC 33.0 02/22/2021    RDW 14.8 04/15/2024    RDW 14.2 02/22/2021     (L) 04/15/2024     02/22/2021       BMP RESULTS:  Lab Results   Component Value Date     04/15/2024     02/22/2021    POTASSIUM 4.3 04/15/2024    POTASSIUM 4.6 11/07/2022    POTASSIUM 4.2 02/22/2021    CHLORIDE 99 04/15/2024    CHLORIDE 105 11/07/2022    CHLORIDE 103 02/22/2021    CO2 30 (H) 04/15/2024    CO2 29 11/07/2022    CO2 29 02/22/2021    ANIONGAP 10 04/15/2024    ANIONGAP 6 11/07/2022    ANIONGAP 6 02/22/2021    GLC 92 04/15/2024     (H) 04/08/2024     (H) 11/07/2022     (H) 02/22/2021    BUN 22.1 04/15/2024    BUN 17 11/07/2022    BUN 14 02/22/2021    CR 1.29 (H) 04/15/2024    CR 0.97 02/22/2021    GFRESTIMATED 41 (L) 04/15/2024    GFRESTIMATED 51 (L) 03/13/2022    GFRESTIMATED 56 (L) 02/22/2021    GFRESTBLACK 64 02/22/2021    SAUL 9.0 04/15/2024    SAUL 9.9 02/22/2021        A1C RESULTS:  Lab Results   Component Value Date    A1C 6.1 (H) 02/08/2024    A1C 6.3 (H) 02/22/2021       INR RESULTS:  Lab Results   Component Value Date    INR 2.4 (A) 06/04/2024    INR 2.5 05/21/2024    INR 2.1 07/21/2021    INR 2.3 (A) 07/06/2021           CC  Taylor Payan MD  [address] [place] [address]

## 2024-06-07 NOTE — PATIENT INSTRUCTIONS
INCREASE LASIX (FUROSEMIDE) TO 40MG DAILY. SHE NEEDS TO INCREASE POTASSIUM IN HER DIET WHILE ON HIGHER DOSE OF LASIX.

## 2024-06-11 ENCOUNTER — TELEPHONE (OUTPATIENT)
Dept: CARDIOLOGY | Facility: CLINIC | Age: 84
End: 2024-06-11

## 2024-06-11 ENCOUNTER — NURSE TRIAGE (OUTPATIENT)
Dept: NURSING | Facility: CLINIC | Age: 84
End: 2024-06-11
Payer: COMMERCIAL

## 2024-06-11 NOTE — TELEPHONE ENCOUNTER
Patient is calling for lab results and FNA relayed lab results to patient and patient agrees.    Reason for Disposition   Health Information question, no triage required and triager able to answer question    Additional Information   Negative: RN needs further essential information from caller in order to complete triage   Negative: Requesting regular office appointment   Negative: [1] Caller requesting NON-URGENT health information AND [2] PCP's office is the best resource    Protocols used: Information Only Call - No Triage-A-

## 2024-06-11 NOTE — TELEPHONE ENCOUNTER
Please see post OV labs and advise, per last OV note:     I would like to increase her Lasix from 20 mg to 40 mg and changed her prescription to reflect this increase.  She will need to increase the potassium in her diet.  I will have her follow-up in clinic within a few weeks to reassess fluid balance and we will also check her electrolytes.  I have ordered basic metabolic panel and NT BNP today as well.

## 2024-06-13 DIAGNOSIS — T78.3XXA ANGIOEDEMA, INITIAL ENCOUNTER: ICD-10-CM

## 2024-06-13 RX ORDER — EPINEPHRINE 0.3 MG/.3ML
0.3 INJECTION SUBCUTANEOUS PRN
Qty: 2 EACH | Refills: 3 | Status: SHIPPED | OUTPATIENT
Start: 2024-06-13

## 2024-06-14 NOTE — TELEPHONE ENCOUNTER
"Patient calls to ask why Jeanne Figueroa NP's name is on her Epi-Pen. Patient states that prescription was picked up last week and she is \"wanting to know why Jeanne is involved\". Relayed to patient that the reason patient's epi pen is under Jeanne's name is because Dr. Payan is out on vacation and Jeanne is one of the covering providers.     Patient verbalizes understanding of instructions and indicates no further questions at this time.    Thank you,  Dawood Ortez, Triage RN [facility]  10:20 AM 6/14/2024       "

## 2024-06-18 ENCOUNTER — ANTICOAGULATION THERAPY VISIT (OUTPATIENT)
Dept: ANTICOAGULATION | Facility: CLINIC | Age: 84
End: 2024-06-18
Payer: COMMERCIAL

## 2024-06-18 DIAGNOSIS — I48.21 PERMANENT ATRIAL FIBRILLATION (H): Primary | ICD-10-CM

## 2024-06-18 DIAGNOSIS — I48.20 CHRONIC ATRIAL FIBRILLATION (H): ICD-10-CM

## 2024-06-18 LAB — INR (EXTERNAL): 1.6

## 2024-06-18 NOTE — PROGRESS NOTES
ANTICOAGULATION MANAGEMENT     Savanna Rehman 83 year old female is on warfarin with subtherapeutic INR result. (Goal INR 2.0-3.0)    Recent labs: (last 7 days)     06/18/24  0000   INR 1.6       ASSESSMENT     Source(s): Chart Review and Home Care/Facility Nurse -Raj from [facility]    Warfarin doses taken: Missed dose(s) may be affecting INR  Diet: No new diet changes identified  Medication/supplement changes: None noted  New illness, injury, or hospitalization: No  Signs or symptoms of bleeding or clotting: No  Previous result: Therapeutic last 2(+) visits  Additional findings: None       PLAN     Recommended plan for temporary change(s) affecting INR     Dosing Instructions: booster dose then continue your current warfarin dose with next INR in 1 week       Summary  As of 6/18/2024      Full warfarin instructions:  6/18: 5 mg; Otherwise 3.75 mg every day   Next INR check:  6/25/2024               Telephone call with Raj home care nurse who verbalizes understanding and agrees to plan    Orders given to  Homecare nurse/facility to recheck    Education provided:   Please call back if any changes to your diet, medications or how you've been taking warfarin    Plan made per [facility] anticoagulation protocol    Lucia Mai RN  Anticoagulation Clinic  6/18/2024    _______________________________________________________________________     Anticoagulation Episode Summary       Current INR goal:  2.0-3.0   TTR:  55.5% (11.3 mo)   Target end date:  Indefinite   Send INR reminders to:  NIKKI CHASE    Indications    Permanent atrial fibrillation (H) [I48.21]  Chronic atrial fibrillation (H) [I48.20]  Long term (current) use of anticoagulants (Resolved) [Z79.01]             Comments:  Home care              Anticoagulation Care Providers       Provider Role Specialty Phone number    Patti Suárez MD Referring Internal Medicine [phone]

## 2024-06-20 ENCOUNTER — NURSE TRIAGE (OUTPATIENT)
Dept: INTERNAL MEDICINE | Facility: CLINIC | Age: 84
End: 2024-06-20
Payer: COMMERCIAL

## 2024-06-20 DIAGNOSIS — G43.801 OTHER MIGRAINE WITH STATUS MIGRAINOSUS, NOT INTRACTABLE: ICD-10-CM

## 2024-06-20 DIAGNOSIS — G43.009 MIGRAINE WITHOUT AURA AND WITHOUT STATUS MIGRAINOSUS, NOT INTRACTABLE: ICD-10-CM

## 2024-06-20 RX ORDER — BUTALBITAL, ASPIRIN, AND CAFFEINE 325; 50; 40 MG/1; MG/1; MG/1
CAPSULE ORAL
Qty: 15 CAPSULE | Refills: 0 | OUTPATIENT
Start: 2024-06-20

## 2024-06-20 RX ORDER — BUTALBITAL, ASPIRIN, AND CAFFEINE 325; 50; 40 MG/1; MG/1; MG/1
1 CAPSULE ORAL EVERY 4 HOURS PRN
Qty: 10 CAPSULE | Refills: 0 | Status: SHIPPED | OUTPATIENT
Start: 2024-06-20 | End: 2024-09-12

## 2024-06-20 NOTE — TELEPHONE ENCOUNTER
Dr Payan out of the office.   Will route to a covering provider, would [facility] be an option? Not sure if this would be appropriate.   Pt is refusing ED.

## 2024-06-20 NOTE — TELEPHONE ENCOUNTER
Patient is calling back and advised of Jeanne Figueroa message below. Patient is still declining to go to ER - no transportation for today. Patient is wondering if she could be seen tomorrow if possible. Okay to use POA slot? Please advise.     Please call patient with provider response

## 2024-06-20 NOTE — TELEPHONE ENCOUNTER
Pt states she need refill of her Fiorinal. She will take her last one today, for her headache.      Provider approval needed.

## 2024-06-20 NOTE — TELEPHONE ENCOUNTER
Patient needs to be seen in person for further evaluation.  If [facility] has availability they may be willing to see her

## 2024-06-20 NOTE — TELEPHONE ENCOUNTER
"S-(situation): 84 yo F with hx of migraines, AF and HF on Coumadin calling for HA x2 weeks, has been worsening to \"11/10\" over the past week(denies worst HA of her life, but reports feels like it is very close, doesn't feel like her typical migraines and her migraine medication didn't help). Reports tight feeling along her forehead and temples. She also reports eye pain and blurry vision, and  dizziness when getting up. Speaking clearly and denies any weakness or numbness.    B-(background): Recent increased lasix dose from 20mg to 40 mg on 6/7 and patient feels this may be related to her dizziness. No known head injury/fall per patient.    A-(assessment): Concern for severe HA/possible CNS bleed given SNOOP criteria of older age, eye  pain, progressive sxs unrelieved by meds, and positional effects in patient on blood thinner    R-(recommendations): Go to ER now for evaluation. Patient reports she has no ride until tomorrow morning when her  gets home. Strongly recommended calling 911 for ambulance, but patient preferred to transfer call to PCP office for second level triage. TE sent and called over to PCP team for urgent follow-up.    Shannon Pink RN        Reason for Disposition   Patient sounds very sick or weak to the triager    Additional Information   Negative: Difficult to awaken or acting confused (e.g., disoriented, slurred speech)   Negative: Weakness of the face, arm or leg on one side of the body and new-onset   Negative: Numbness of the face, arm or leg on one side of the body and new-onset   Negative: Loss of speech or garbled speech and new-onset   Negative: Passed out (i.e., fainted, collapsed and was not responding)   Negative: Sounds like a life-threatening emergency to the triager   Negative: Followed a head injury within last 3 days   Negative: Traumatic Brain Injury (TBI) is suspected   Negative: Sinus pain of forehead and yellow or green nasal discharge   Negative: Pregnant   Negative: " "Unable to walk without falling   Negative: Stiff neck (can't touch chin to chest)   Negative: Possibility of carbon monoxide exposure   Negative: SEVERE headache, states 'worst headache' of life   Negative: SEVERE headache, sudden-onset (i.e., reaching maximum intensity within seconds to 1 hour)   Negative: Severe pain in one eye   Negative: Loss of vision or double vision  (Exception: Same as prior migraines.)    Answer Assessment - Initial Assessment Questions  1. LOCATION: \"Where does it hurt?\"       Forehead/temples feel tight and in her ears as well  2. ONSET: \"When did the headache start?\" (Minutes, hours or days)       2 weeks ago, became more severe last week  3. PATTERN: \"Does the pain come and go, or has it been constant since it started?\"      Intermittent. Notes feeling off/dizzy when getting up from chair  4. SEVERITY: \"How bad is the pain?\" and \"What does it keep you from doing?\"  (e.g., Scale 1-10; mild, moderate, or severe)    - MILD (1-3): doesn't interfere with normal activities     - MODERATE (4-7): interferes with normal activities or awakens from sleep     - SEVERE (8-10): excruciating pain, unable to do any normal activities         \"11\" states she can't sit up straight  5. RECURRENT SYMPTOM: \"Have you ever had headaches before?\" If Yes, ask: \"When was the last time?\" and \"What happened that time?\"       Yes, hx of migraines  6. CAUSE: \"What do you think is causing the headache?\"      Thinks it may be related to increased lasix dose from 10 to 20 mg  7. MIGRAINE: \"Have you been diagnosed with migraine headaches?\" If Yes, ask: \"Is this headache similar?\"       Yes, hx of migraines. States this HA feels different than her normal HA  8. HEAD INJURY: \"Has there been any recent injury to the head?\"       No  9. OTHER SYMPTOMS: \"Do you have any other symptoms?\" (fever, stiff neck, eye pain, sore throat, cold symptoms)      Also notes eye pain along with HA for the past 2 weeks along with blurry " "vision  10. PREGNANCY: \"Is there any chance you are pregnant?\" \"When was your last menstrual period?\"        N/A    Protocols used: Headache-A-OH    "

## 2024-06-20 NOTE — TELEPHONE ENCOUNTER
"Call to pt. She took Fiorinal, that is helping a little bit. She has 2 tablets left that she will take today. Refill request sent to provider.     She will call in AM at 9, if still has symptoms, to see about ADS appointment. She still declines ED due to last ED visit waited 5 hours.     She thinks she is \"allergic\" to Lasix. She states her headache started after the last Lasix increase. Dr Waller increased this. Patient has follow up in July with Dr Waller.     Pain is also in her neck, she told other nurse it wasn't but it is.     She said this could be a really bad migraine, she decided.     Will keep open since may call back in AM.       "

## 2024-06-21 ENCOUNTER — TELEPHONE (OUTPATIENT)
Dept: INTERNAL MEDICINE | Facility: CLINIC | Age: 84
End: 2024-06-21
Payer: COMMERCIAL

## 2024-06-21 NOTE — TELEPHONE ENCOUNTER
Fax received from [facility] 06/22/24 / Order Nbr. [id number] for review and signature.  Put in Jeanne Figueroa's in basket on Dr. Payan's behalf.

## 2024-06-24 ENCOUNTER — TELEPHONE (OUTPATIENT)
Dept: INTERNAL MEDICINE | Facility: CLINIC | Age: 84
End: 2024-06-24
Payer: COMMERCIAL

## 2024-06-24 NOTE — TELEPHONE ENCOUNTER
Medication Change form arrived via fax from [facility] to increase furosemide to 40mg daily.    Placed in provider mailbox for signature

## 2024-06-25 ENCOUNTER — MEDICAL CORRESPONDENCE (OUTPATIENT)
Dept: HEALTH INFORMATION MANAGEMENT | Facility: CLINIC | Age: 84
End: 2024-06-25

## 2024-06-25 ENCOUNTER — ANTICOAGULATION THERAPY VISIT (OUTPATIENT)
Dept: ANTICOAGULATION | Facility: CLINIC | Age: 84
End: 2024-06-25
Payer: COMMERCIAL

## 2024-06-25 DIAGNOSIS — I48.20 CHRONIC ATRIAL FIBRILLATION (H): ICD-10-CM

## 2024-06-25 DIAGNOSIS — I48.21 PERMANENT ATRIAL FIBRILLATION (H): Primary | ICD-10-CM

## 2024-06-25 LAB — INR (EXTERNAL): 1.3 (ref 0.9–1.1)

## 2024-06-25 NOTE — TELEPHONE ENCOUNTER
Forms completed and faxed [phone]  Forms sent to [facility] for scanning

## 2024-06-25 NOTE — PROGRESS NOTES
ANTICOAGULATION MANAGEMENT     Savanna Rehman 83 year old female is on warfarin with subtherapeutic INR result. (Goal INR 2.0-3.0)    Recent labs: (last 7 days)     06/25/24  1300   INR 1.3*       ASSESSMENT     Source(s): Chart Review and Home Care/Facility Nurse     Warfarin doses taken: Missed dose(s) may be affecting INR, patient spilled water on pills so missed 2 doses for sure.  Diet: No new diet changes identified  Medication/supplement changes: None noted  New illness, injury, or hospitalization: No  Signs or symptoms of bleeding or clotting: No  Previous result: Subtherapeutic  Additional findings: None       PLAN     Recommended plan for temporary change(s) affecting INR     Dosing Instructions: booster dose then continue your current warfarin dose with next INR in 1 week       Summary  As of 6/25/2024      Full warfarin instructions:  6/25: 7.5 mg; Otherwise 3.75 mg every day   Next INR check:  7/3/2024               Telephone call with [facility] home care nurse who agrees to plan and repeated back plan correctly    Orders given to  Homecare nurse/facility to recheck    Education provided:   None required    Plan made per [facility] anticoagulation protocol    Mahsa Scott, RN  Anticoagulation Clinic  6/25/2024    _______________________________________________________________________     Anticoagulation Episode Summary       Current INR goal:  2.0-3.0   TTR:  55.4% (11.3 mo)   Target end date:  Indefinite   Send INR reminders to:  NIKKI CHASE    Indications    Permanent atrial fibrillation (H) [I48.21]  Chronic atrial fibrillation (H) [I48.20]  Long term (current) use of anticoagulants (Resolved) [Z79.01]             Comments:  Home care              Anticoagulation Care Providers       Provider Role Specialty Phone number    Patti Suárez MD Referring Internal Medicine [phone]

## 2024-06-27 ENCOUNTER — TELEPHONE (OUTPATIENT)
Dept: CARDIOLOGY | Facility: CLINIC | Age: 84
End: 2024-06-27
Payer: COMMERCIAL

## 2024-06-27 ENCOUNTER — HOSPITAL ENCOUNTER (EMERGENCY)
Facility: CLINIC | Age: 84
Discharge: LEFT BEFORE DISCHARGE | End: 2024-06-27
Admitting: EMERGENCY MEDICINE
Payer: COMMERCIAL

## 2024-06-27 VITALS
OXYGEN SATURATION: 98 % | WEIGHT: 251.32 LBS | HEART RATE: 92 BPM | DIASTOLIC BLOOD PRESSURE: 86 MMHG | TEMPERATURE: 97.1 F | RESPIRATION RATE: 20 BRPM | SYSTOLIC BLOOD PRESSURE: 157 MMHG | BODY MASS INDEX: 38.21 KG/M2

## 2024-06-27 PROCEDURE — 99281 EMR DPT VST MAYX REQ PHY/QHP: CPT

## 2024-06-27 PROCEDURE — 93005 ELECTROCARDIOGRAM TRACING: CPT

## 2024-06-27 NOTE — TELEPHONE ENCOUNTER
Patient states that she has gained 7 lb in one week, reports that she is soaking through her clothing due to the edema.  Has noted mild increase in SOB.  Patient wanting extra diuretics.  RN did advise that she go in to the ER for evaluation due the amount of weight gain.  Patient not happy but agrees with plan and states he daughter will bring her in later today.    Veronica Dominguez RN on 6/27/2024 at 2:10 PM

## 2024-06-27 NOTE — ED TRIAGE NOTES
Pt states that she has increased leg swelling even though her doctor increased her furosemide about two weeks ago.  Pt also notices increased trouble taking a deep breath and increased SOBOE     Triage Assessment (Adult)       Row Name 06/27/24 3089          Triage Assessment    Airway WDL WDL        Respiratory WDL    Respiratory WDL X;all     Rhythm/Pattern, Respiratory dyspnea upon exertion        Peripheral/Neurovascular WDL    Peripheral Neurovascular WDL WDL        Cognitive/Neuro/Behavioral WDL    Cognitive/Neuro/Behavioral WDL WDL

## 2024-06-27 NOTE — TELEPHONE ENCOUNTER
[facility] Call Center    Phone Message    May a detailed message be left on voicemail: yes     Reason for Call: Symptoms or Concerns     If patient has red-flag symptoms, warm transfer to triage line    Current symptom or concern: Swelling in legs     Symptoms have been present for:  1 day(s)    Has patient previously been seen for this? Yes    By: Christin    Are there any new or worsening symptoms? Yes: Patient is having 7 pound weight gain and serve swelling in legs. Feels like her left leg is a tree trunk. Once in a while rush of dizziness. Patient did not want triage. Patient stats that she has to change her clothes 3 times with all the water. Please reach out ASAP    Action Taken: Other: cardiology     Travel Screening: Not Applicable    Thank you!  [facility]       Date of Service:

## 2024-06-28 ENCOUNTER — HOSPITAL ENCOUNTER (EMERGENCY)
Facility: CLINIC | Age: 84
Discharge: HOME | End: 2024-06-28
Payer: COMMERCIAL

## 2024-06-28 ENCOUNTER — TELEPHONE (OUTPATIENT)
Dept: CARDIOLOGY | Facility: CLINIC | Age: 84
End: 2024-06-28
Payer: COMMERCIAL

## 2024-06-28 ENCOUNTER — APPOINTMENT (OUTPATIENT)
Dept: ULTRASOUND IMAGING | Facility: CLINIC | Age: 84
End: 2024-06-28
Payer: COMMERCIAL

## 2024-06-28 ENCOUNTER — APPOINTMENT (OUTPATIENT)
Dept: CT IMAGING | Facility: CLINIC | Age: 84
End: 2024-06-28
Payer: COMMERCIAL

## 2024-06-28 ENCOUNTER — DOCUMENTATION ONLY (OUTPATIENT)
Dept: ANTICOAGULATION | Facility: CLINIC | Age: 84
End: 2024-06-28

## 2024-06-28 ENCOUNTER — TELEPHONE (OUTPATIENT)
Dept: CARDIOLOGY | Facility: CLINIC | Age: 84
End: 2024-06-28

## 2024-06-28 VITALS
SYSTOLIC BLOOD PRESSURE: 126 MMHG | OXYGEN SATURATION: 95 % | HEART RATE: 61 BPM | BODY MASS INDEX: 38.02 KG/M2 | TEMPERATURE: 97.7 F | RESPIRATION RATE: 11 BRPM | DIASTOLIC BLOOD PRESSURE: 64 MMHG | WEIGHT: 250.88 LBS | HEIGHT: 68 IN

## 2024-06-28 DIAGNOSIS — I48.21 PERMANENT ATRIAL FIBRILLATION (H): Primary | ICD-10-CM

## 2024-06-28 DIAGNOSIS — I48.20 CHRONIC ATRIAL FIBRILLATION (H): ICD-10-CM

## 2024-06-28 DIAGNOSIS — M79.89 LEG SWELLING: ICD-10-CM

## 2024-06-28 DIAGNOSIS — R06.02 SHORTNESS OF BREATH: ICD-10-CM

## 2024-06-28 LAB
ALBUMIN SERPL BCG-MCNC: 3.9 G/DL (ref 3.5–5.2)
ALP SERPL-CCNC: 157 U/L (ref 40–150)
ALT SERPL W P-5'-P-CCNC: 24 U/L (ref 0–50)
ANION GAP SERPL CALCULATED.3IONS-SCNC: 10 MMOL/L (ref 7–15)
APTT PPP: 34 SECONDS (ref 22–38)
AST SERPL W P-5'-P-CCNC: 42 U/L (ref 0–45)
ATRIAL RATE - MUSE: 258 BPM
ATRIAL RATE - MUSE: 71 BPM
BASOPHILS # BLD AUTO: 0.1 10E3/UL (ref 0–0.2)
BASOPHILS NFR BLD AUTO: 1 %
BILIRUB DIRECT SERPL-MCNC: 0.2 MG/DL (ref 0–0.3)
BILIRUB SERPL-MCNC: 0.6 MG/DL
BUN SERPL-MCNC: 16.9 MG/DL (ref 8–23)
CALCIUM SERPL-MCNC: 9 MG/DL (ref 8.8–10.2)
CHLORIDE SERPL-SCNC: 98 MMOL/L (ref 98–107)
CREAT SERPL-MCNC: 1.17 MG/DL (ref 0.51–0.95)
D DIMER PPP FEU-MCNC: 1.39 UG/ML FEU (ref 0–0.5)
DEPRECATED HCO3 PLAS-SCNC: 30 MMOL/L (ref 22–29)
DIASTOLIC BLOOD PRESSURE - MUSE: NORMAL MMHG
DIASTOLIC BLOOD PRESSURE - MUSE: NORMAL MMHG
EGFRCR SERPLBLD CKD-EPI 2021: 47 ML/MIN/1.73M2
EOSINOPHIL # BLD AUTO: 0.3 10E3/UL (ref 0–0.7)
EOSINOPHIL NFR BLD AUTO: 6 %
ERYTHROCYTE [DISTWIDTH] IN BLOOD BY AUTOMATED COUNT: 13.9 % (ref 10–15)
GLUCOSE SERPL-MCNC: 107 MG/DL (ref 70–99)
HCT VFR BLD AUTO: 36.5 % (ref 35–47)
HGB BLD-MCNC: 11.9 G/DL (ref 11.7–15.7)
HOLD SPECIMEN: NORMAL
IMM GRANULOCYTES # BLD: 0 10E3/UL
IMM GRANULOCYTES NFR BLD: 1 %
INR PPP: 1.4 (ref 0.85–1.15)
INTERPRETATION ECG - MUSE: NORMAL
INTERPRETATION ECG - MUSE: NORMAL
LYMPHOCYTES # BLD AUTO: 1.4 10E3/UL (ref 0.8–5.3)
LYMPHOCYTES NFR BLD AUTO: 32 %
MCH RBC QN AUTO: 30.5 PG (ref 26.5–33)
MCHC RBC AUTO-ENTMCNC: 32.6 G/DL (ref 31.5–36.5)
MCV RBC AUTO: 94 FL (ref 78–100)
MONOCYTES # BLD AUTO: 0.8 10E3/UL (ref 0–1.3)
MONOCYTES NFR BLD AUTO: 19 %
NEUTROPHILS # BLD AUTO: 1.8 10E3/UL (ref 1.6–8.3)
NEUTROPHILS NFR BLD AUTO: 41 %
NRBC # BLD AUTO: 0 10E3/UL
NRBC BLD AUTO-RTO: 0 /100
NT-PROBNP SERPL-MCNC: 799 PG/ML (ref 0–1800)
P AXIS - MUSE: NORMAL DEGREES
P AXIS - MUSE: NORMAL DEGREES
PLATELET # BLD AUTO: 120 10E3/UL (ref 150–450)
POTASSIUM SERPL-SCNC: 3.9 MMOL/L (ref 3.4–5.3)
PR INTERVAL - MUSE: NORMAL MS
PR INTERVAL - MUSE: NORMAL MS
PROT SERPL-MCNC: 7.6 G/DL (ref 6.4–8.3)
QRS DURATION - MUSE: 104 MS
QRS DURATION - MUSE: 106 MS
QT - MUSE: 412 MS
QT - MUSE: 470 MS
QTC - MUSE: 451 MS
QTC - MUSE: 470 MS
R AXIS - MUSE: -28 DEGREES
R AXIS - MUSE: -31 DEGREES
RBC # BLD AUTO: 3.9 10E6/UL (ref 3.8–5.2)
SODIUM SERPL-SCNC: 138 MMOL/L (ref 135–145)
SYSTOLIC BLOOD PRESSURE - MUSE: NORMAL MMHG
SYSTOLIC BLOOD PRESSURE - MUSE: NORMAL MMHG
T AXIS - MUSE: 102 DEGREES
T AXIS - MUSE: 119 DEGREES
TROPONIN T SERPL HS-MCNC: 18 NG/L
TROPONIN T SERPL HS-MCNC: 20 NG/L
VENTRICULAR RATE- MUSE: 60 BPM
VENTRICULAR RATE- MUSE: 72 BPM
WBC # BLD AUTO: 4.4 10E3/UL (ref 4–11)

## 2024-06-28 PROCEDURE — 85379 FIBRIN DEGRADATION QUANT: CPT

## 2024-06-28 PROCEDURE — 85730 THROMBOPLASTIN TIME PARTIAL: CPT

## 2024-06-28 PROCEDURE — 85610 PROTHROMBIN TIME: CPT

## 2024-06-28 PROCEDURE — 71275 CT ANGIOGRAPHY CHEST: CPT

## 2024-06-28 PROCEDURE — 250N000009 HC RX 250

## 2024-06-28 PROCEDURE — 82565 ASSAY OF CREATININE: CPT

## 2024-06-28 PROCEDURE — 250N000011 HC RX IP 250 OP 636

## 2024-06-28 PROCEDURE — 82247 BILIRUBIN TOTAL: CPT

## 2024-06-28 PROCEDURE — 36415 COLL VENOUS BLD VENIPUNCTURE: CPT

## 2024-06-28 PROCEDURE — 99285 EMERGENCY DEPT VISIT HI MDM: CPT | Mod: 25

## 2024-06-28 PROCEDURE — 82374 ASSAY BLOOD CARBON DIOXIDE: CPT

## 2024-06-28 PROCEDURE — 83880 ASSAY OF NATRIURETIC PEPTIDE: CPT

## 2024-06-28 PROCEDURE — 84484 ASSAY OF TROPONIN QUANT: CPT | Mod: 91

## 2024-06-28 PROCEDURE — 85041 AUTOMATED RBC COUNT: CPT

## 2024-06-28 PROCEDURE — 93971 EXTREMITY STUDY: CPT | Mod: LT

## 2024-06-28 PROCEDURE — 93005 ELECTROCARDIOGRAM TRACING: CPT

## 2024-06-28 RX ORDER — FUROSEMIDE 20 MG
60 TABLET ORAL DAILY
Qty: 15 TABLET | Refills: 0 | Status: SHIPPED | OUTPATIENT
Start: 2024-06-28 | End: 2024-07-05

## 2024-06-28 RX ORDER — IOPAMIDOL 755 MG/ML
500 INJECTION, SOLUTION INTRAVASCULAR ONCE
Status: COMPLETED | OUTPATIENT
Start: 2024-06-28 | End: 2024-06-28

## 2024-06-28 RX ADMIN — IOPAMIDOL 73 ML: 755 INJECTION, SOLUTION INTRAVENOUS at 11:40

## 2024-06-28 RX ADMIN — SODIUM CHLORIDE 90 ML: 9 INJECTION, SOLUTION INTRAVENOUS at 11:40

## 2024-06-28 ASSESSMENT — ACTIVITIES OF DAILY LIVING (ADL)
ADLS_ACUITY_SCORE: 38
ADLS_ACUITY_SCORE: 36

## 2024-06-28 ASSESSMENT — COLUMBIA-SUICIDE SEVERITY RATING SCALE - C-SSRS
2. HAVE YOU ACTUALLY HAD ANY THOUGHTS OF KILLING YOURSELF IN THE PAST MONTH?: NO
1. IN THE PAST MONTH, HAVE YOU WISHED YOU WERE DEAD OR WISHED YOU COULD GO TO SLEEP AND NOT WAKE UP?: NO
6. HAVE YOU EVER DONE ANYTHING, STARTED TO DO ANYTHING, OR PREPARED TO DO ANYTHING TO END YOUR LIFE?: NO

## 2024-06-28 NOTE — TELEPHONE ENCOUNTER
[facility] Call Center    Phone Message    May a detailed message be left on voicemail: yes     Reason for Call: Other: pt will be increasing her Lasix by 2 tables of 20 mg for 5 days and then pt will report changes to Dr. Waller how she is doing.      Action Taken: Message routed to:  [facility] cardio    Travel Screening: Not Applicable    Thank you!  [facility]       Date of Service:

## 2024-06-28 NOTE — TELEPHONE ENCOUNTER
RN noted. Will await update from patient on how she is doing in 5 days. Recommendations for increased lasix given by provider in ED today 6/28/24

## 2024-06-28 NOTE — ED TRIAGE NOTES
Pt presents to the ED with bilateral leg swelling, left side is worse. Pt takes 40mg lasix daily. Pt states she feels like she can't take a deep breath. Pt came to the ED yesterday but left because of the wait times.

## 2024-06-28 NOTE — ED PROVIDER NOTES
"  Emergency Department Note      History of Present Illness     Chief Complaint   Leg Swelling      HPI   Savanna Rehman is a 83 year old female with a history of encephalopathy, atrial fibrillation, diabetes, cirrhosis, anticoagulated on warfarin who presents for evaluation of leg swelling.  Patient states that about a week ago she developed increased leg swelling, left worse than right.  She notes that she has also had difficulty taking a full breath as well.  Patient has been taking Lasix, which was recently increased from 20mg daily to 40mg daily by her cardiologist approximately one month ago. She notes that she has had intermittent lightheadedness when bending forward as well and is concerned this is secondary to increase in lasix.  The patient is anticoagulated on warfarin, noting that her last INR was 1.3 and was given \"3 pills\" to compensate for this.  The patient states that her therapeutic range is between \"2-5\".  Patient denies any fever, upper respiratory symptoms, new headaches, chest pain, back pain, vomiting, diarrhea, numbness, or weakness.    Independent Historian   None    Review of External Notes   6/7/24: Patient was evaluated by her cardiologist, increased Lasix from 20 mg to 40 mg daily and recommended increase potassium in her diet.  Patient has permanent atrial fibrillation on warfarin and permanent pacemaker device due to symptomatic bradycardia, has mild anemia and thrombocytopenia probably related to underlying liver disease.    Past Medical History     Medical History and Problem List   Past Medical History:   Diagnosis Date    Acute encephalopathy 09/21/2023    Anemia     Atrial fibrillation (H)     CAD (coronary artery disease)     Chronic pain     Congestive heart failure (H)     Degenerative disk disease     Diabetes (H)     Fibromyalgia     Gastro-oesophageal reflux disease     Gout     Hiatal hernia     Mumps     Neuropathy     CRISTIANA (obstructive sleep apnea) - CPAP     " Palpitations     Pernicious anemia     Sleep apnea     Urinary incontinence     Vitamin D deficiency        Medications   furosemide (LASIX) 20 MG tablet  acetaminophen (TYLENOL) 500 MG tablet  butalbital-aspirin-caffeine (FIORINAL) -40 MG capsule  Cholecalciferol (VITAMIN D-3) 125 MCG (5000 UT) TABS  EPINEPHrine (ANY BX GENERIC EQUIV) 0.3 MG/0.3ML injection 2-pack  escitalopram (LEXAPRO) 10 MG tablet  famotidine (PEPCID) 20 MG tablet  glipiZIDE (GLUCOTROL XL) 2.5 MG 24 hr tablet  indapamide (LOZOL) 2.5 MG tablet  loperamide (IMODIUM) 2 MG capsule  melatonin 3 MG tablet  warfarin ANTICOAGULANT (COUMADIN) 2.5 MG tablet  warfarin ANTICOAGULANT (COUMADIN) 2.5 MG tablet  Warfarin Sodium (COUMADIN PO)        Surgical History   Past Surgical History:   Procedure Laterality Date    APPENDECTOMY      CHOLECYSTECTOMY      COLONOSCOPY  3/15/2011    COLONOSCOPY N/A 3/15/2023    Procedure: COLONOSCOPY, WITH POLYPECTOMY AND BIOPSY;  Surgeon: Maci Coronel MD;  Location:  GI    COLONOSCOPY N/A 3/15/2023    Procedure: COLONOSCOPY, FLEXIBLE, WITH LESION REMOVAL USING SNARE;  Surgeon: Maci Coronel MD;  Location:  GI    CORONARY ANGIOGRAPHY ADULT ORDER      CYSTOSCOPY, BIOPSY BLADDER, COMBINED N/A 7/13/2020    Procedure: CYSTOSCOPY, WITH BLADDER BIOPSY;  Surgeon: Deisy Wilson MD;  Location: [facility] OR    EP PACEMAKER DEVICE & LEAD IMPLANT- RIGHT ATRIAL & RIGHT VENTRICULAR Left 4/5/2024    Procedure: Pacemaker Device & Lead Implant - Right Atrial & Right Ventricular;  Surgeon: Raul Plunkett MD;  Location:  HEART CARDIAC CATH LAB    HEART CATH LEFT HEART CATH  12/30/16    medication management    HYSTERECTOMY TOTAL ABDOMINAL      Knee replacement NOS Left     LAPAROSCOPIC NISSEN FUNDOPLICATION N/A 2/4/2015    Procedure: LAPAROSCOPIC NISSEN FUNDOPLICATION;  Surgeon: Armando Ansari MD;  Location:  OR    TONSILLECTOMY      TRANSPOSITION ULNAR NERVE (ELBOW)         Physical Exam     Patient Vitals for  "the past 24 hrs:   BP Temp Temp src Pulse Resp SpO2 Height Weight   06/28/24 1330 126/64 -- -- -- -- 95 % -- --   06/28/24 1300 -- -- -- -- -- 96 % -- --   06/28/24 1230 131/61 -- -- 61 -- 98 % -- --   06/28/24 1215 125/63 -- -- -- -- 96 % -- --   06/28/24 1036 108/62 -- -- 60 11 95 % -- --   06/28/24 1010 128/62 -- -- 73 -- -- -- --   06/28/24 0933 (!) 172/60 97.7  F (36.5  C) Temporal 83 24 96 % 1.727 m (5' 8\") 113.8 kg (250 lb 14.1 oz)     Physical Exam  General: Alert, well developed, well nourished. Cooperative.     In mild distress  HEENT:  Head:  Atraumatic  Ears:  External ears are normal  Mouth/Throat:  Oropharynx is without erythema or exudate and mucous membranes are moist.   Eyes:   Conjunctivae normal and EOM are normal. No scleral icterus.    Pupils are equal, round, and reactive to light.   Neck:   Normal range of motion. Neck supple.  CV:  Normal rate, regular rhythm, normal heart sounds and radial and DP pulses are 2+ and symmetric.  No murmur.  Resp:  Breath sounds are clear bilaterally    Non-labored, no retractions or accessory muscle use  MS:  Normal range of motion. 2+ pitting edema of bilateral lower extremities, L>R.    Back atraumatic.    No midline cervical, thoracic, or lumbar tenderness  Skin:  Warm and dry.  No rash or lesions noted.  Neuro:   Alert. Normal strength.  Sensation intact in all 4 extremities. 5/5 strength of bilateral upper and lower extremities. No pronator drift, normal finger nose finger, heel to shin.    Cranial nerves 2-12 intact.  Psych: Normal mood and affect.    Diagnostics     Lab Results   Labs Ordered and Resulted from Time of ED Arrival to Time of ED Departure   D DIMER QUANTITATIVE - Abnormal       Result Value    D-Dimer Quantitative 1.39 (*)    INR - Abnormal    INR 1.40 (*)    BASIC METABOLIC PANEL - Abnormal    Sodium 138      Potassium 3.9      Chloride 98      Carbon Dioxide (CO2) 30 (*)     Anion Gap 10      Urea Nitrogen 16.9      Creatinine 1.17 (*)  "    GFR Estimate 47 (*)     Calcium 9.0      Glucose 107 (*)    TROPONIN T, HIGH SENSITIVITY - Abnormal    Troponin T, High Sensitivity 20 (*)    CBC WITH PLATELETS AND DIFFERENTIAL - Abnormal    WBC Count 4.4      RBC Count 3.90      Hemoglobin 11.9      Hematocrit 36.5      MCV 94      MCH 30.5      MCHC 32.6      RDW 13.9      Platelet Count 120 (*)     % Neutrophils 41      % Lymphocytes 32      % Monocytes 19      % Eosinophils 6      % Basophils 1      % Immature Granulocytes 1      NRBCs per 100 WBC 0      Absolute Neutrophils 1.8      Absolute Lymphocytes 1.4      Absolute Monocytes 0.8      Absolute Eosinophils 0.3      Absolute Basophils 0.1      Absolute Immature Granulocytes 0.0      Absolute NRBCs 0.0     TROPONIN T, HIGH SENSITIVITY - Abnormal    Troponin T, High Sensitivity 18 (*)    HEPATIC FUNCTION PANEL - Abnormal    Protein Total 7.6      Albumin 3.9      Bilirubin Total 0.6      Alkaline Phosphatase 157 (*)     AST 42      ALT 24      Bilirubin Direct 0.20     PARTIAL THROMBOPLASTIN TIME - Normal    aPTT 34     NT PROBNP INPATIENT - Normal    N terminal Pro BNP Inpatient 799         Imaging   US Lower Extremity Venous Duplex Left   Final Result   IMPRESSION: No evidence of deep venous thrombosis.      CHELLY KNUTSON MD            SYSTEM ID:  [id number]      CT Chest Pulmonary Embolism w Contrast   Final Result   IMPRESSION:   1.  No acute process demonstrated.       KATE LAWTON MD            SYSTEM ID:  [id number]          EKG   ECG results from 06/28/24   EKG 12-lead, tracing only     Value    Systolic Blood Pressure     Diastolic Blood Pressure     Ventricular Rate 60    Atrial Rate 71    GA Interval     QRS Duration 106        QTc 470    P Axis     R AXIS -28    T Axis 102    Interpretation ECG      Ventricular-paced rhythm  Abnormal ECG  When compared with ECG of 27-JUN-2024 18:25,  Vent. rate has decreased BY  12 BPM  Interpreted by Dr. Hou     *Note: Due to a large number of  results and/or encounters for the requested time period, some results have not been displayed. A complete set of results can be found in Results Review.     Independent Interpretation   None    ED Course      Medications Administered   Medications   iopamidol (ISOVUE-370) solution 500 mL (73 mLs Intravenous $Given 6/28/24 1140)   CT Scan Flush (90 mLs Intravenous $Given 6/28/24 1140)     Procedures   Procedures     Discussion of Management   Patient was seen in conjunction with Dr. Hou    [facility] adding to complexity of care   None    ED Course        Medical Decision Making / Diagnosis     CMS Diagnoses: None    MIPS    CT for PE was ordered because the patient had an abnormal d-dimer.    KIARA Rehman is a 83 year old female with a history of encephalopathy, atrial fibrillation, diabetes, cirrhosis, anticoagulated on warfarin who presents for evaluation of leg swelling.  On exam, the patient has regular rate and rhythm with clear breath sounds bilaterally and pitting lower extremity edema, left worse than right.  Patient has a normal neurologic exam without any focal deficits.  Vital signs show initial elevated blood pressure which improved throughout ED course without fever, tachycardia, or hypoxia.  Blood work is most notable for an INR of 1.4 which is now within therapeutic range and an elevated D-dimer at 1.39.  Otherwise, the patient has findings of chronic kidney disease and cirrhosis without any concerning electrolyte abnormalities.  There is no leukocytosis or anemia.  Serial troponins are flat.  EKG shows a paced rhythm without evidence of ischemia.  The pacemaker was interrogated and this did not show any concerning arrhythmias recently.  Given that the patient had lower extremity edema which was worse on the left, ultrasound was obtained which shows no evidence of DVT.  With an elevated D-dimer and INR outside of the therapeutic range, CT of the chest was obtained  as well which showed no evidence of PE.  At this time, exam, lab work, and imaging are reassuring and patient is appropriate for discharge.  We recommended that she increase the Lasix from 40 mg daily to 60 mg daily for the next 5 days due to significant leg swelling.  Following this, she should continue to take 40 mg of Lasix daily.  We advised patient to follow-up with her cardiologist and primary care provider for reevaluation and ongoing management of symptoms.  She should return to the emergency department for fever, chest pain, increased difficulty breathing, syncope, numbness, weakness, or any other new concerns.  The patient was in agreement with this plan and all questions were answered.    Disposition   The patient was discharged.     Diagnosis     ICD-10-CM    1. Shortness of breath  R06.02       2. Leg swelling  M79.89            Discharge Medications   New Prescriptions    FUROSEMIDE (LASIX) 20 MG TABLET    Take 3 tablets (60 mg) by mouth daily for 5 days         ANNA Barlow Carley J, PA-C  06/28/24 1732

## 2024-06-28 NOTE — TELEPHONE ENCOUNTER
RN called patient and reviewed with her that we strongly recommend that she return to the ED for evaluation and treatment given her acute symptoms and continued weight gain since yesterday. Patient verbalized understanding and is in agreement with plan. Patient will have her daughter drive her back to the ED this morning and will wait to be evaluated.

## 2024-06-28 NOTE — PROGRESS NOTES
ANTICOAGULATION  MANAGEMENT: Discharge Review    Savanna Rehman chart reviewed for anticoagulation continuity of care    Emergency room visit on 6/28/24 for Shortness of breath, leg swelling.    Discharge disposition: Home    Results:    Recent labs: (last 7 days)     06/25/24  1300 06/28/24  1020   INR 1.3* 1.40*     Anticoagulation inpatient management:     not applicable     Anticoagulation discharge instructions:     Warfarin dosing: home regimen continued   Bridging: No   INR goal change: No      Medication changes affecting anticoagulation: Yes: furosemide increased from 40 mg every day to 60 mg every day for the next 5 days then back to 40 mg every day       Additional factors affecting anticoagulation: Yes: increased leg swelling, INR low today at ED at 1.40 but trending upward from 1.3 on 6/25/24 where boost was recommended by Anticoagulation clinic      PLAN     Recommend to check INR on 7/3/24, patient was given instruction on 6/25/24 to boost warfarin  due to 2 missed dose, INR is trending upwards as expected with boost. Will continue the plan given on 6/25 Anticoagulation clinic encounter.    Recommended follow up is scheduled    Anticoagulation Calendar updated    Iris Kemp RN

## 2024-06-28 NOTE — ED PROVIDER NOTES
ED ATTENDING PHYSICIAN NOTE:   I evaluated this patient in conjunction with Magdalena Escoto PA-C  I have participated in the care of the patient and personally performed key elements of the history, exam, and medical decision making.      HPI:   Savanna Rehman is a 83 year old female, anticoagulated on Warfarin, with a history of encephalopathy, atrial fibrillation, diabetes, and cirrhosis who presents for leg swelling. For the past week, she has had increasing swelling of both legs, and it has been worse than left. Additionally, she has had some shortness of breath during this time frame. Another recent symptom mentioned was some intermittent lightheadedness, and it would usually occur while she is bending over. She denies fever, upper respiratory symptoms, new headache, chest pain, back pain, vomiting, diarrhea, numbness, or weakness. Of note, her last INR check was 2 days ago, and it was 1.3 at that time.     Independent Historian:   None    Review of External Notes:   Independent reviewed the patient's most recent progress note from INR clinic 6/24/2024     EXAM:   Physical Exam  Constitutional:       General: She is not in acute distress.     Appearance: Normal appearance. She is not diaphoretic.   HENT:      Head: Atraumatic.      Mouth/Throat:      Mouth: Mucous membranes are moist.   Eyes:      General: No scleral icterus.     Conjunctiva/sclera: Conjunctivae normal.   Cardiovascular:      Rate and Rhythm: Normal rate and regular rhythm.      Heart sounds: Normal heart sounds.   Pulmonary:      Effort: Pulmonary effort is normal. No respiratory distress.      Breath sounds: Normal breath sounds.   Abdominal:      General: Abdomen is flat.   Musculoskeletal:      Cervical back: Neck supple.      Right lower leg: Edema present.      Left lower leg: Edema present.   Skin:     General: Skin is warm.      Findings: No erythema or rash.   Neurological:      Mental Status: She is alert.           Independent  Interpretation (X-rays, CTs, rhythm strip):  None    Consultations/Discussion of Management or Tests:  None     Social Determinants of Health affecting care:   None     MEDICAL DECISION MAKING/ASSESSMENT AND PLAN:   Patient with complicated medical history presenting with shortness of breath and worsening bilateral lower extremity swelling.  Vital signs initially notable for elevated blood pressure but then reassuring.  Considered differential including acute coronary syndrome, pulmonary embolism, CHF exacerbation, pneumonia, among others.  Workup here is reassuring.  The patient's bilateral lower extremity swelling, suspect secondary to hypervolemia/congestive heart failure.  There is no evidence of cellulitis.  Patient had her Lasix dose increased to 40 mg daily about 1 month ago.  At this time, since she does have significant pitting edema bilaterally, did recommend she increase her Lasix to 60 mg daily over the next 5 days and then follow-up with her primary care clinician for reevaluation.     DIAGNOSIS:     ICD-10-CM    1. Shortness of breath  R06.02       2. Leg swelling  M79.89            DISPOSITION:   The patient was discharged to home.      Scribe Disclosure:  I, Asael Mayo, am serving as a scribe at 1:21 PM on 6/28/2024 to document services personally performed by Davonte Hou MD based on my observations and the provider's statements to me.   6/28/2024  [facility] EMERGENCY DEPT     Davonte Hou MD  06/29/24 4220

## 2024-06-28 NOTE — TELEPHONE ENCOUNTER
[facility] Call Center    Phone Message    May a detailed message be left on voicemail: yes     Reason for Call: Symptoms or Concerns     If patient has red-flag symptoms, warm transfer to triage line    Current symptom or concern: lower extremity edema,  weight gain, patient went to [facility] ED on 6/27 but left due to wait.     Symptoms have been present for:  1.5 week(s)    Has patient previously been seen for this? No      Action Taken: Other: cardio    Travel Screening: Not Applicable     Date of Service:

## 2024-06-28 NOTE — DISCHARGE INSTRUCTIONS
Increase lasix to 60mg daily for the next 5 days, then reduce back to 40mg daily.   Follow-up with your primary care doctor and cardiologist within the next week for reevaluation and ongoing management  Return to the ER for headache, vision changes, worsening shortness of breath, chest pain, vomiting, fainting, confusion, and any other new concerns

## 2024-06-29 ENCOUNTER — TELEPHONE (OUTPATIENT)
Dept: INTERNAL MEDICINE | Facility: CLINIC | Age: 84
End: 2024-06-29
Payer: COMMERCIAL

## 2024-06-29 DIAGNOSIS — I50.9 CONGESTIVE HEART FAILURE, UNSPECIFIED HF CHRONICITY, UNSPECIFIED HEART FAILURE TYPE (H): Primary | ICD-10-CM

## 2024-06-29 DIAGNOSIS — M79.89 SWELLING OF LOWER LEG: ICD-10-CM

## 2024-07-02 ENCOUNTER — ANTICOAGULATION THERAPY VISIT (OUTPATIENT)
Dept: ANTICOAGULATION | Facility: CLINIC | Age: 84
End: 2024-07-02
Payer: COMMERCIAL

## 2024-07-02 DIAGNOSIS — I48.20 CHRONIC ATRIAL FIBRILLATION (H): ICD-10-CM

## 2024-07-02 DIAGNOSIS — I48.21 PERMANENT ATRIAL FIBRILLATION (H): Primary | ICD-10-CM

## 2024-07-02 LAB — INR (EXTERNAL): 1.4 (ref 0.9–1.1)

## 2024-07-02 NOTE — PROGRESS NOTES
ANTICOAGULATION MANAGEMENT     Savanna Rehman 83 year old female is on warfarin with subtherapeutic INR result. (Goal INR 2.0-3.0)    Recent labs: (last 7 days)     07/02/24  1251   INR 1.4*       ASSESSMENT     Source(s): Chart Review, Patient/Caregiver Call, and Home Care/Facility Nurse     Warfarin doses taken: More warfarin taken than planned which may be affecting INR - possibly more warfarin taken then normal but unknown   Diet: No new diet changes identified  Medication/supplement changes:  lasix increased to 60mg for 5 days for leg swelling, last day is tomorrow then will go back down to 40mg daily   New illness, injury, or hospitalization: Yes: 6/28  Signs or symptoms of bleeding or clotting: No  Previous result: Subtherapeutic  Additional findings: None       PLAN     Recommended plan for temporary change(s) affecting INR     Dosing Instructions: booster dose then Increase your warfarin dose (9.5% change) with next INR in 1 week       Summary  As of 7/2/2024      Full warfarin instructions:  7/2: 5 mg; Otherwise 5 mg every Sun, Thu; 3.75 mg all other days   Next INR check:  7/9/2024               Telephone call with [place] home care nurse who verbalizes understanding and agrees to plan and who agrees to plan and repeated back plan correctly    Orders given to  Homecare nurse/facility to recheck    Education provided: Taking warfarin: take warfarin at same time each day; preferably in the evening, prescribed tablet strength and color, Importance of taking warfarin as instructed, and warfarin tablet strength change; remove and/or discard previous strength from medication supply    Plan made per [facility] anticoagulation protocol    Sanjana Fox RN  Anticoagulation Clinic  7/2/2024    _______________________________________________________________________     Anticoagulation Episode Summary       Current INR goal:  2.0-3.0   TTR:  55.0% (11.3 mo)   Target end date:  Indefinite   Send INR reminders to:   [facility]    Indications    Permanent atrial fibrillation (H) [I48.21]  Chronic atrial fibrillation (H) [I48.20]  Long term (current) use of anticoagulants (Resolved) [Z79.01]             Comments:  Home care              Anticoagulation Care Providers       Provider Role Specialty Phone number    Patti Suárez MD Referring Internal Medicine [phone]

## 2024-07-03 ENCOUNTER — TELEPHONE (OUTPATIENT)
Dept: ANTICOAGULATION | Facility: CLINIC | Age: 84
End: 2024-07-03
Payer: COMMERCIAL

## 2024-07-03 NOTE — TELEPHONE ENCOUNTER
Was to take lasix at 60mg for only 5 days.  Please clarify if patient now taking lasix at 20mg or 40 mg daily

## 2024-07-03 NOTE — PROGRESS NOTES
Update. Pt will have INR check at provider office on 7/9/24 as she has an appointment. INR result can call to Raj from [facility] with dosing and next INR check date.  Phone [phone]- [phone] to leave message.     Lucia Mai RN  [facility] Anticoagulation Clinic.

## 2024-07-03 NOTE — TELEPHONE ENCOUNTER
Patient due for INR recheck on 7/9/24 with home care.  Raj from [facility] call to report that patient has an appointment with provider on 7/9/24 and has set up to have INR recheck in clinic.  INR result on 7/9/24 can be call to Raj ([phone])  for dosing and recheck date so that home care can coordinate the following visit.  Chart updated.     Lucia Mai RN  [facility] Anticoagulation Clinic.

## 2024-07-05 ENCOUNTER — OFFICE VISIT (OUTPATIENT)
Dept: INTERNAL MEDICINE | Facility: CLINIC | Age: 84
End: 2024-07-05
Payer: COMMERCIAL

## 2024-07-05 ENCOUNTER — LAB (OUTPATIENT)
Dept: LAB | Facility: CLINIC | Age: 84
End: 2024-07-05
Payer: COMMERCIAL

## 2024-07-05 VITALS
BODY MASS INDEX: 39.65 KG/M2 | SYSTOLIC BLOOD PRESSURE: 101 MMHG | OXYGEN SATURATION: 91 % | TEMPERATURE: 97.8 F | RESPIRATION RATE: 16 BRPM | HEIGHT: 65 IN | WEIGHT: 238 LBS | HEART RATE: 77 BPM | DIASTOLIC BLOOD PRESSURE: 60 MMHG

## 2024-07-05 DIAGNOSIS — N18.31 TYPE 2 DIABETES MELLITUS WITH STAGE 3A CHRONIC KIDNEY DISEASE, WITHOUT LONG-TERM CURRENT USE OF INSULIN (H): ICD-10-CM

## 2024-07-05 DIAGNOSIS — E11.22 TYPE 2 DIABETES MELLITUS WITH STAGE 3A CHRONIC KIDNEY DISEASE, WITHOUT LONG-TERM CURRENT USE OF INSULIN (H): ICD-10-CM

## 2024-07-05 DIAGNOSIS — I48.20 CHRONIC ATRIAL FIBRILLATION (H): ICD-10-CM

## 2024-07-05 DIAGNOSIS — I50.9 CONGESTIVE HEART FAILURE, UNSPECIFIED HF CHRONICITY, UNSPECIFIED HEART FAILURE TYPE (H): ICD-10-CM

## 2024-07-05 DIAGNOSIS — I77.810 ASCENDING AORTA DILATATION (H): ICD-10-CM

## 2024-07-05 DIAGNOSIS — E66.01 CLASS 2 SEVERE OBESITY DUE TO EXCESS CALORIES WITH SERIOUS COMORBIDITY IN ADULT, UNSPECIFIED BMI (H): ICD-10-CM

## 2024-07-05 DIAGNOSIS — G47.33 OBSTRUCTIVE SLEEP APNEA (ADULT) (PEDIATRIC): ICD-10-CM

## 2024-07-05 DIAGNOSIS — E66.812 CLASS 2 SEVERE OBESITY DUE TO EXCESS CALORIES WITH SERIOUS COMORBIDITY IN ADULT, UNSPECIFIED BMI (H): ICD-10-CM

## 2024-07-05 DIAGNOSIS — R00.2 PALPITATIONS: ICD-10-CM

## 2024-07-05 DIAGNOSIS — F33.41 RECURRENT MAJOR DEPRESSIVE DISORDER, IN PARTIAL REMISSION (H): ICD-10-CM

## 2024-07-05 DIAGNOSIS — D69.6 THROMBOCYTOPENIA (H): ICD-10-CM

## 2024-07-05 DIAGNOSIS — M79.89 SWELLING OF LOWER LEG: Primary | ICD-10-CM

## 2024-07-05 DIAGNOSIS — I48.21 PERMANENT ATRIAL FIBRILLATION (H): ICD-10-CM

## 2024-07-05 LAB
ANION GAP SERPL CALCULATED.3IONS-SCNC: 11 MMOL/L (ref 7–15)
BUN SERPL-MCNC: 23.6 MG/DL (ref 8–23)
CALCIUM SERPL-MCNC: 9.3 MG/DL (ref 8.8–10.2)
CHLORIDE SERPL-SCNC: 96 MMOL/L (ref 98–107)
CREAT SERPL-MCNC: 1.29 MG/DL (ref 0.51–0.95)
DEPRECATED HCO3 PLAS-SCNC: 32 MMOL/L (ref 22–29)
EGFRCR SERPLBLD CKD-EPI 2021: 41 ML/MIN/1.73M2
ERYTHROCYTE [DISTWIDTH] IN BLOOD BY AUTOMATED COUNT: 13.7 % (ref 10–15)
FOLATE SERPL-MCNC: 9.3 NG/ML (ref 4.6–34.8)
GLUCOSE SERPL-MCNC: 132 MG/DL (ref 70–99)
HBA1C MFR BLD: 6.3 % (ref 0–5.6)
HCT VFR BLD AUTO: 39.6 % (ref 35–47)
HGB BLD-MCNC: 13.5 G/DL (ref 11.7–15.7)
MCH RBC QN AUTO: 31.3 PG (ref 26.5–33)
MCHC RBC AUTO-ENTMCNC: 34.1 G/DL (ref 31.5–36.5)
MCV RBC AUTO: 92 FL (ref 78–100)
PLATELET # BLD AUTO: 131 10E3/UL (ref 150–450)
POTASSIUM SERPL-SCNC: 4.4 MMOL/L (ref 3.4–5.3)
RBC # BLD AUTO: 4.32 10E6/UL (ref 3.8–5.2)
SODIUM SERPL-SCNC: 139 MMOL/L (ref 135–145)
TSH SERPL DL<=0.005 MIU/L-ACNC: 2.63 UIU/ML (ref 0.3–4.2)
VIT B12 SERPL-MCNC: 662 PG/ML (ref 232–1245)
WBC # BLD AUTO: 5.2 10E3/UL (ref 4–11)

## 2024-07-05 PROCEDURE — 83036 HEMOGLOBIN GLYCOSYLATED A1C: CPT | Performed by: INTERNAL MEDICINE

## 2024-07-05 PROCEDURE — 96127 BRIEF EMOTIONAL/BEHAV ASSMT: CPT | Performed by: INTERNAL MEDICINE

## 2024-07-05 PROCEDURE — 36415 COLL VENOUS BLD VENIPUNCTURE: CPT | Performed by: INTERNAL MEDICINE

## 2024-07-05 PROCEDURE — 99214 OFFICE O/P EST MOD 30 MIN: CPT | Performed by: INTERNAL MEDICINE

## 2024-07-05 PROCEDURE — 82607 VITAMIN B-12: CPT | Performed by: INTERNAL MEDICINE

## 2024-07-05 PROCEDURE — 85027 COMPLETE CBC AUTOMATED: CPT | Performed by: INTERNAL MEDICINE

## 2024-07-05 PROCEDURE — 80048 BASIC METABOLIC PNL TOTAL CA: CPT | Performed by: INTERNAL MEDICINE

## 2024-07-05 PROCEDURE — 82746 ASSAY OF FOLIC ACID SERUM: CPT | Performed by: INTERNAL MEDICINE

## 2024-07-05 PROCEDURE — G2211 COMPLEX E/M VISIT ADD ON: HCPCS | Performed by: INTERNAL MEDICINE

## 2024-07-05 PROCEDURE — 84443 ASSAY THYROID STIM HORMONE: CPT | Performed by: INTERNAL MEDICINE

## 2024-07-05 RX ORDER — FUROSEMIDE 40 MG
40 TABLET ORAL DAILY
Qty: 90 TABLET | Refills: 0 | Status: SHIPPED | OUTPATIENT
Start: 2024-07-05 | End: 2024-10-04

## 2024-07-05 ASSESSMENT — PATIENT HEALTH QUESTIONNAIRE - PHQ9: SUM OF ALL RESPONSES TO PHQ QUESTIONS 1-9: 10

## 2024-07-05 NOTE — PROGRESS NOTES
"  Assessment & Plan     Swelling of lower leg  Completed Lasix at 60 mg daily.  Overall, lower leg swelling looks improved.  Continue Lasix at 40 mg.  Refill completed.  Patient recommended to continue monitoring weight at home.    Chronic atrial fibrillation (H)  On chronic anticoagulation with warfarin medication.  Follows up with INR clinic.    Recurrent major depressive disorder, in partial remission (H24)  taking Lexapro at half a tablet per day.  Overall, symptoms stable.    Type 2 diabetes mellitus with stage 3a chronic kidney disease, without long-term current use of insulin (H)  Update A1c lab work.  Continues on glipizide medication at 2.5 mg twice daily.  - Hemoglobin A1c; Future  - Hemoglobin A1c        MED REC REQUIRED  Post Medication Reconciliation Status: Completed  BMI  Estimated body mass index is 39.3 kg/m  as calculated from the following:    Height as of this encounter: 1.657 m (5' 5.25\").    Weight as of this encounter: 108 kg (238 lb).             Mamadou Corrales is a 83 year old, presenting for the following health issues:  Hospital F/U      7/5/2024     2:36 PM   Additional Questions   Roomed by Stephanie BRAR       ED/UC Followup:    Facility:  [facility] ED   Date of visit: 6/28/24  Reason for visit: SOB and leg swelling   Current Status: ok   Patient comes in today for follow-up after most recent emergency room visit with concerns of lower leg swelling.  Patient was treated for hypervolemia/congestive heart failure.  Lasix dose was increased from 40 mg to 60 mg for 5 days.  Overall, lower leg swelling has improved.  Patient completed Lasix at the increased dose.  Recommendation to continue the Lasix at 40 mg daily now.      Review of Systems  Constitutional, HEENT, cardiovascular, pulmonary, gi and gu systems are negative, except as otherwise noted.      Objective    /60   Pulse 77   Temp 97.8  F (36.6  C) (Oral)   Resp 16   Ht 1.657 m (5' 5.25\")   Wt 108 kg (238 lb)   LMP  (LMP " Unknown)   SpO2 91%   BMI 39.30 kg/m    Body mass index is 39.3 kg/m .  Physical Exam   GENERAL: alert and no distress  RESP: lungs clear to auscultation - no rales, rhonchi or wheezes  CV: regular rate and rhythm, normal S1 S2  MS: no gross musculoskeletal defects noted.  NEURO: Normal strength and tone, mentation intact and speech normal  PSYCH: mentation appears normal, affect normal            Signed Electronically by: Taylor Payan MD

## 2024-07-07 ENCOUNTER — HEALTH MAINTENANCE LETTER (OUTPATIENT)
Age: 84
End: 2024-07-07

## 2024-07-09 ENCOUNTER — TELEPHONE (OUTPATIENT)
Dept: CARDIOLOGY | Facility: CLINIC | Age: 84
End: 2024-07-09

## 2024-07-09 NOTE — TELEPHONE ENCOUNTER
[facility] Call Center    Phone Message    May a detailed message be left on voicemail: yes     Reason for Call: Other: Is there any way that Dr. Waller would do this appt today as a virtual appt?  Pt has a horrible migraine and can't come in but does want to see Dr. Waller via video.  Please call pt back to advise.  Thank You     Action Taken: Message routed to:  [facility] cardio    Travel Screening: Not Applicable    Thank you!  [facility]       Date of Service:

## 2024-07-09 NOTE — TELEPHONE ENCOUNTER
Per Dr. Waller would like patient seen in person, has ok'd use of urgent or cardio-onc spot.  Attempted to call patient to help schedule, message left for patient to return call to RN at [phone].  Veronica Domignuez RN on 7/9/2024 at 9:06 AM

## 2024-07-10 ENCOUNTER — TELEPHONE (OUTPATIENT)
Dept: CARDIOLOGY | Facility: CLINIC | Age: 84
End: 2024-07-10

## 2024-07-10 NOTE — TELEPHONE ENCOUNTER
Results noted. Patient lasix was increased from 20mg to 40mg daily on 6/11 and then patient was seen in ED on 6/28/24 for weight gain/edema and was recommended to increase lasxi to 60mg daily x5 days then continue lasix at 40mg daily and follow up with cardiology. Patient was scheduled for visit with Dr. Waller yesterday 7/9/24, but was unable to make apt due to migraine. Patient has been contacted in attempt to reschedule apt. RN will send lab update to Dr. Waller for review and advisement as creat is 1.29.                           Component      Latest Ref Rng 7/5/2024  3:27 PM   Sodium      135 - 145 mmol/L 139    Potassium      3.4 - 5.3 mmol/L 4.4    Chloride      98 - 107 mmol/L 96 (L)    Carbon Dioxide (CO2)      22 - 29 mmol/L 32 (H)    Anion Gap      7 - 15 mmol/L 11    Urea Nitrogen      8.0 - 23.0 mg/dL 23.6 (H)    Creatinine      0.51 - 0.95 mg/dL 1.29 (H)    GFR Estimate      >60 mL/min/1.73m2 41 (L)    Calcium      8.8 - 10.2 mg/dL 9.3    Glucose      70 - 99 mg/dL 132 (H)       Legend:  (L) Low  (H) High

## 2024-07-15 ENCOUNTER — TELEPHONE (OUTPATIENT)
Dept: ANTICOAGULATION | Facility: CLINIC | Age: 84
End: 2024-07-15
Payer: COMMERCIAL

## 2024-07-15 NOTE — TELEPHONE ENCOUNTER
[facility] ANTICOAGULATION  MANAGEMENT    [facility] requesting pre-procedure hold orders for warfarin and review for bridging      Procedure date: 8/12/24       Procedure:  EGD      Procedure location and phone number (if external): [facility]  ([phone])     Number of warfarin hold days requested and/or target INR: 4 days    Pre-op date: Not applicable      Routing to Anticoagulation Pharmacist for review.      Dorothy River RN

## 2024-07-16 ENCOUNTER — ANTICOAGULATION THERAPY VISIT (OUTPATIENT)
Dept: ANTICOAGULATION | Facility: CLINIC | Age: 84
End: 2024-07-16
Payer: COMMERCIAL

## 2024-07-16 DIAGNOSIS — I48.20 CHRONIC ATRIAL FIBRILLATION (H): ICD-10-CM

## 2024-07-16 DIAGNOSIS — I48.21 PERMANENT ATRIAL FIBRILLATION (H): Primary | ICD-10-CM

## 2024-07-16 DIAGNOSIS — E11.42 DIABETIC POLYNEUROPATHY ASSOCIATED WITH TYPE 2 DIABETES MELLITUS (H): ICD-10-CM

## 2024-07-16 LAB — INR (EXTERNAL): 1.5 (ref 0.9–1.1)

## 2024-07-16 RX ORDER — GLIPIZIDE 2.5 MG/1
2.5 TABLET, EXTENDED RELEASE ORAL 2 TIMES DAILY
Qty: 180 TABLET | Refills: 3 | Status: SHIPPED | OUTPATIENT
Start: 2024-07-16

## 2024-07-16 NOTE — PROGRESS NOTES
ANTICOAGULATION MANAGEMENT     Savanna Rehman 83 year old female is on warfarin with subtherapeutic INR result. (Goal INR 2.0-3.0)    Recent labs: (last 7 days)     07/16/24  1206   INR 1.5*       ASSESSMENT     Source(s): Chart Review and Home Care/Facility Nurse     Warfarin doses taken: Missed dose(s) may be affecting INR  Diet: No new diet changes identified  Medication/supplement changes: None noted  New illness, injury, or hospitalization: diarrhea for 2 weeks  Signs or symptoms of bleeding or clotting: No  Previous result: Subtherapeutic  Additional findings: Upcoming surgery/procedure 08/12/2024 EGD       PLAN     Recommended plan for temporary change(s) and ongoing change(s) affecting INR     Dosing Instructions: Increase your warfarin dose (8.7% change) with next INR in 1 week       Summary  As of 7/16/2024      Full warfarin instructions:  3.75 mg every Mon, Wed, Fri; 5 mg all other days   Next INR check:  7/23/2024             Clinical rational for the dose adjustment: last therapeutic INR was June 4. Patient has had diarrhea for 2 weeks which should increase INR. Patient has on going factor of missing doses of warfarin.    Telephone call with [place] home care nurse who agrees to plan and repeated back plan correctly    Orders given to  Homecare nurse/facility to recheck    Education provided: Symptom monitoring: monitoring for bleeding signs and symptoms, monitoring for clotting signs and symptoms, monitoring for stroke signs and symptoms, and when to seek medical attention/emergency care    Plan made with [facility] Pharmacist Beba Scott, RN  Anticoagulation Clinic  7/16/2024    _______________________________________________________________________     Anticoagulation Episode Summary       Current INR goal:  2.0-3.0   TTR:  54.2% (11.3 mo)   Target end date:  Indefinite   Send INR reminders to:  NIKKI CHASE    Indications    Permanent atrial fibrillation (H)  [I48.21]  Chronic atrial fibrillation (H) [I48.20]  Long term (current) use of anticoagulants (Resolved) [Z79.01]             Comments:  Home care              Anticoagulation Care Providers       Provider Role Specialty Phone number    Patti Suárez MD Referring Internal Medicine [phone]

## 2024-07-17 ENCOUNTER — OFFICE VISIT (OUTPATIENT)
Dept: DERMATOLOGY | Facility: CLINIC | Age: 84
End: 2024-07-17
Attending: INTERNAL MEDICINE
Payer: COMMERCIAL

## 2024-07-17 DIAGNOSIS — L72.0 MILIA: Primary | ICD-10-CM

## 2024-07-17 PROCEDURE — 99202 OFFICE O/P NEW SF 15 MIN: CPT | Performed by: NURSE PRACTITIONER

## 2024-07-17 ASSESSMENT — PAIN SCALES - GENERAL: PAINLEVEL: NO PAIN (0)

## 2024-07-17 NOTE — PROGRESS NOTES
[facility] Dermatology Note  Encounter Date: Jul 17, 2024  Office Visit     Reviewed patients past medical history and pertinent chart review prior to patients visit today.     Dermatology Problem List:  History of skin cancer on face in 1980's.     ____________________________________________    Assessment & Plan:     # Milia. Benign, no further treatment needed.  Advised okay to start OTC retinol or adapalene 0.1% cream nightly if desires as prevention, can be drying to the skin.     Follow up for skin exam at patients convenience     Olive Pollard CNP  Dermatology    _______________________________________    CC: Derm Problem (Look at some bumps on face)    HPI:  Ms. Savanna Rehman is a(n) 83 year old female who presents today as a new patient for a couple of hard lumps on the face.  It is asymptomatic.  One is on the right cheek where she says she has had skin cancer in the past so this concerned her.     Patient is otherwise feeling well, without additional skin concerns.      Physical Exam:  SKIN: Focused examination of face was performed.  -cheeks nose and forehead, scattered 1 mm white dome shaped firm papules     - No other lesions of concern on areas examined.     Medications:  Current Outpatient Medications   Medication Sig Dispense Refill    acetaminophen (TYLENOL) 500 MG tablet Take 1,000 mg by mouth every 6 hours as needed for mild pain      butalbital-aspirin-caffeine (FIORINAL) -40 MG capsule TAKE 1 CAPSULE BY MOUTH EVERY 4 HOURS AS NEEDED FOR HEADACHES OR  MIGRAINE 10 capsule 0    Cholecalciferol (VITAMIN D-3) 125 MCG (5000 UT) TABS Take 5,000 Units by mouth daily      EPINEPHrine (ANY BX GENERIC EQUIV) 0.3 MG/0.3ML injection 2-pack Inject 0.3 mLs (0.3 mg) into the muscle as needed for anaphylaxis May repeat one time in 5-15 minutes if response to initial dose is inadequate. 2 each 3    escitalopram (LEXAPRO) 10 MG tablet Take 1 tablet (10 mg) by mouth at bedtime 90 tablet  1    famotidine (PEPCID) 20 MG tablet Take 20 mg by mouth 2 times daily      furosemide (LASIX) 40 MG tablet Take 1 tablet (40 mg) by mouth daily for 90 days 90 tablet 0    glipiZIDE (GLUCOTROL XL) 2.5 MG 24 hr tablet Take 1 tablet by mouth twice daily 180 tablet 3    indapamide (LOZOL) 2.5 MG tablet Take 1 tablet (2.5 mg) by mouth every morning 90 tablet 1    loperamide (IMODIUM) 2 MG capsule TAKE 1 CAPSULE BY MOUTH 4 TIMES DAILY AS NEEDED FOR DIARRHEA 60 capsule 0    melatonin 3 MG tablet Take 3 mg by mouth nightly as needed for sleep      warfarin ANTICOAGULANT (COUMADIN) 2.5 MG tablet Take 3.75 mg by mouth six times a week Daily except on Mondays      warfarin ANTICOAGULANT (COUMADIN) 2.5 MG tablet Take 2.5 mg by mouth once a week On Monday      Warfarin Sodium (COUMADIN PO) Take by mouth daily   4/12/24: INR 1.6 today  Take coumadin 7.5 mg on 4/12 and 4/13 and then 5mg on 4/14 and 4/15 then check INR on 4/16 4/16/2024 INR 2.2 give 3.5mg coumadin daily and check INR on 4/19 4/19/2024:  INR IS 2.1  RESUME HOME REGIMEN OF COUMADIN  as listed       No current facility-administered medications for this visit.     Facility-Administered Medications Ordered in Other Visits   Medication Dose Route Frequency Provider Last Rate Last Admin    nitroglycerin 100 MCG/ML injection               Past Medical History:   Patient Active Problem List   Diagnosis    Angioedema    CRISTIANA (obstructive sleep apnea)     Recurrent UTI    Urgency-frequency syndrome    Urgency incontinence    Candidiasis of skin    Chronic atrial fibrillation (H)    Permanent atrial fibrillation (H)    Hyperlipidemia LDL goal <100    Vitamin D deficiency    Type 2 diabetes mellitus with stage 3a chronic kidney disease, without long-term current use of insulin (H)    Ascending aorta dilatation (H24)    Nonrheumatic tricuspid valve regurgitation    Anemia    Gastroesophageal reflux disease    Gout    Chronic kidney disease, stage 3    C. difficile colitis,  recurrent -- she needs to take Vanco 125 bid anytime she is on a different Abx     Bony pelvic pain    Adjustment disorder with anxiety    Adjustment disorder with mixed anxiety and depressed mood    Abnormal feces    Diverticular disease of colon    Iron deficiency anemia    Loose stools    Lower abdominal pain    Noninfectious gastroenteritis    Elevated troponin    Anemia due to blood loss, acute    Subtherapeutic international normalized ratio (INR)    Traumatic hematoma of buttock, subsequent encounter    Chest pain, unspecified type    Repeated falls    Right-sided chest wall pain    Contusion of right thigh, subsequent encounter    Dizziness and giddiness    Physical deconditioning    Acute posthemorrhagic anemia    Obstructive sleep apnea (adult) (pediatric)    Gastro-esophageal reflux disease without esophagitis    History of Clostridium difficile colitis    Contusion of lower back and pelvis, subsequent encounter    Muscle weakness (generalized)    Atrial fibrillation, unspecified type (H)    Long term current use of anticoagulant therapy    Hypertensive heart disease with chronic diastolic congestive heart failure (H)    Skin yeast infection    Abnormal liver enzymes    Cirrhosis of liver (H)    Confusion associated with infection    Acute encephalopathy    Diarrhea    Hypervolemia, unspecified hypervolemia type    Congestive heart failure, unspecified HF chronicity, unspecified heart failure type (H)    Diverticulitis    Colonic diverticular abscess    Hematoma of buttock    Atrial fibrillation with slow ventricular response (H)    Diverticulitis of large intestine with abscess, unspecified bleeding status    S/P placement of cardiac pacemaker: 4/5/2024    Anticoagulated on Coumadin    Cognitive impairment    Adjustment disorder with anxious mood    Abnormal WBC count    Chronic diarrhea    Urinary incontinence, unspecified type    Thrombocytopenia (H24)    Rash and nonspecific skin eruption    Major  depression, recurrent (H)    Class 2 severe obesity due to excess calories with serious comorbidity in adult (H)     Past Medical History:   Diagnosis Date    Acute encephalopathy 09/21/2023    Anemia     Iron Deficiency anemia    Atrial fibrillation (H)     CAD (coronary artery disease)     non-obstructive    Chronic pain     neck, low back, legs    Congestive heart failure (H)     Degenerative disk disease     Diabetes (H)     Fibromyalgia     Gastro-oesophageal reflux disease     Gout     Hiatal hernia     Mumps     Neuropathy     CRISTIANA (obstructive sleep apnea) - CPAP     Palpitations     Pernicious anemia     Sleep apnea     uses CPAP.    Urinary incontinence     Vitamin D deficiency        CC Taylor Payan MD  [address] [place] [address] on close of this encounter.

## 2024-07-17 NOTE — LETTER
7/17/2024      Savanna Rehman  [address]  [facility] [address]      Dear Colleague,    Thank you for referring your patient, Savanna Rehman, to the [facility] [place]. Please see a copy of my visit note below.    [facility] Dermatology Note  Encounter Date: Jul 17, 2024  Office Visit     Reviewed patients past medical history and pertinent chart review prior to patients visit today.     Dermatology Problem List:  History of skin cancer on face in 1980's.     ____________________________________________    Assessment & Plan:     # Milia. Benign, no further treatment needed.  Advised okay to start OTC retinol or adapalene 0.1% cream nightly if desires as prevention, can be drying to the skin.     Follow up for skin exam at patients convenience     Olive Pollard CNP  Dermatology    _______________________________________    CC: Derm Problem (Look at some bumps on face)    HPI:  Ms. Savanna Rehman is a(n) 83 year old female who presents today as a new patient for a couple of hard lumps on the face.  It is asymptomatic.  One is on the right cheek where she says she has had skin cancer in the past so this concerned her.     Patient is otherwise feeling well, without additional skin concerns.      Physical Exam:  SKIN: Focused examination of face was performed.  -cheeks nose and forehead, scattered 1 mm white dome shaped firm papules     - No other lesions of concern on areas examined.     Medications:  Current Outpatient Medications   Medication Sig Dispense Refill     acetaminophen (TYLENOL) 500 MG tablet Take 1,000 mg by mouth every 6 hours as needed for mild pain       butalbital-aspirin-caffeine (FIORINAL) -40 MG capsule TAKE 1 CAPSULE BY MOUTH EVERY 4 HOURS AS NEEDED FOR HEADACHES OR  MIGRAINE 10 capsule 0     Cholecalciferol (VITAMIN D-3) 125 MCG (5000 UT) TABS Take 5,000 Units by mouth daily       EPINEPHrine (ANY BX GENERIC EQUIV) 0.3  MG/0.3ML injection 2-pack Inject 0.3 mLs (0.3 mg) into the muscle as needed for anaphylaxis May repeat one time in 5-15 minutes if response to initial dose is inadequate. 2 each 3     escitalopram (LEXAPRO) 10 MG tablet Take 1 tablet (10 mg) by mouth at bedtime 90 tablet 1     famotidine (PEPCID) 20 MG tablet Take 20 mg by mouth 2 times daily       furosemide (LASIX) 40 MG tablet Take 1 tablet (40 mg) by mouth daily for 90 days 90 tablet 0     glipiZIDE (GLUCOTROL XL) 2.5 MG 24 hr tablet Take 1 tablet by mouth twice daily 180 tablet 3     indapamide (LOZOL) 2.5 MG tablet Take 1 tablet (2.5 mg) by mouth every morning 90 tablet 1     loperamide (IMODIUM) 2 MG capsule TAKE 1 CAPSULE BY MOUTH 4 TIMES DAILY AS NEEDED FOR DIARRHEA 60 capsule 0     melatonin 3 MG tablet Take 3 mg by mouth nightly as needed for sleep       warfarin ANTICOAGULANT (COUMADIN) 2.5 MG tablet Take 3.75 mg by mouth six times a week Daily except on Mondays       warfarin ANTICOAGULANT (COUMADIN) 2.5 MG tablet Take 2.5 mg by mouth once a week On Monday       Warfarin Sodium (COUMADIN PO) Take by mouth daily   4/12/24: INR 1.6 today  Take coumadin 7.5 mg on 4/12 and 4/13 and then 5mg on 4/14 and 4/15 then check INR on 4/16 4/16/2024 INR 2.2 give 3.5mg coumadin daily and check INR on 4/19 4/19/2024:  INR IS 2.1  RESUME HOME REGIMEN OF COUMADIN  as listed       No current facility-administered medications for this visit.     Facility-Administered Medications Ordered in Other Visits   Medication Dose Route Frequency Provider Last Rate Last Admin     nitroglycerin 100 MCG/ML injection               Past Medical History:   Patient Active Problem List   Diagnosis     Angioedema     CRISTIANA (obstructive sleep apnea)      Recurrent UTI     Urgency-frequency syndrome     Urgency incontinence     Candidiasis of skin     Chronic atrial fibrillation (H)     Permanent atrial fibrillation (H)     Hyperlipidemia LDL goal <100     Vitamin D deficiency     Type 2  diabetes mellitus with stage 3a chronic kidney disease, without long-term current use of insulin (H)     Ascending aorta dilatation (H24)     Nonrheumatic tricuspid valve regurgitation     Anemia     Gastroesophageal reflux disease     Gout     Chronic kidney disease, stage 3     C. difficile colitis, recurrent -- she needs to take Vanco 125 bid anytime she is on a different Abx      Bony pelvic pain     Adjustment disorder with anxiety     Adjustment disorder with mixed anxiety and depressed mood     Abnormal feces     Diverticular disease of colon     Iron deficiency anemia     Loose stools     Lower abdominal pain     Noninfectious gastroenteritis     Elevated troponin     Anemia due to blood loss, acute     Subtherapeutic international normalized ratio (INR)     Traumatic hematoma of buttock, subsequent encounter     Chest pain, unspecified type     Repeated falls     Right-sided chest wall pain     Contusion of right thigh, subsequent encounter     Dizziness and giddiness     Physical deconditioning     Acute posthemorrhagic anemia     Obstructive sleep apnea (adult) (pediatric)     Gastro-esophageal reflux disease without esophagitis     History of Clostridium difficile colitis     Contusion of lower back and pelvis, subsequent encounter     Muscle weakness (generalized)     Atrial fibrillation, unspecified type (H)     Long term current use of anticoagulant therapy     Hypertensive heart disease with chronic diastolic congestive heart failure (H)     Skin yeast infection     Abnormal liver enzymes     Cirrhosis of liver (H)     Confusion associated with infection     Acute encephalopathy     Diarrhea     Hypervolemia, unspecified hypervolemia type     Congestive heart failure, unspecified HF chronicity, unspecified heart failure type (H)     Diverticulitis     Colonic diverticular abscess     Hematoma of buttock     Atrial fibrillation with slow ventricular response (H)     Diverticulitis of large intestine  with abscess, unspecified bleeding status     S/P placement of cardiac pacemaker: 4/5/2024     Anticoagulated on Coumadin     Cognitive impairment     Adjustment disorder with anxious mood     Abnormal WBC count     Chronic diarrhea     Urinary incontinence, unspecified type     Thrombocytopenia (H24)     Rash and nonspecific skin eruption     Major depression, recurrent (H)     Class 2 severe obesity due to excess calories with serious comorbidity in adult (H)     Past Medical History:   Diagnosis Date     Acute encephalopathy 09/21/2023     Anemia     Iron Deficiency anemia     Atrial fibrillation (H)      CAD (coronary artery disease)     non-obstructive     Chronic pain     neck, low back, legs     Congestive heart failure (H)      Degenerative disk disease      Diabetes (H)      Fibromyalgia      Gastro-oesophageal reflux disease      Gout      Hiatal hernia      Mumps      Neuropathy      CRISTIANA (obstructive sleep apnea) - CPAP      Palpitations      Pernicious anemia      Sleep apnea     uses CPAP.     Urinary incontinence      Vitamin D deficiency        CC Taylor Payan MD  [address] [place] [address] on close of this encounter.       Again, thank you for allowing me to participate in the care of your patient.        Sincerely,        BATSHEVA Wooten CNP

## 2024-07-18 ENCOUNTER — MEDICAL CORRESPONDENCE (OUTPATIENT)
Dept: HEALTH INFORMATION MANAGEMENT | Facility: CLINIC | Age: 84
End: 2024-07-18

## 2024-07-18 ENCOUNTER — ANCILLARY PROCEDURE (OUTPATIENT)
Dept: CARDIOLOGY | Facility: CLINIC | Age: 84
End: 2024-07-18
Attending: INTERNAL MEDICINE
Payer: COMMERCIAL

## 2024-07-18 DIAGNOSIS — Z95.0 CARDIAC PACEMAKER IN SITU: ICD-10-CM

## 2024-07-18 DIAGNOSIS — I48.91 ATRIAL FIBRILLATION (H): ICD-10-CM

## 2024-07-18 PROCEDURE — 93296 REM INTERROG EVL PM/IDS: CPT | Performed by: INTERNAL MEDICINE

## 2024-07-18 PROCEDURE — 93294 REM INTERROG EVL PM/LDLS PM: CPT | Performed by: INTERNAL MEDICINE

## 2024-07-19 LAB
MDC_IDC_EPISODE_DTM: NORMAL
MDC_IDC_EPISODE_DURATION: 0 S
MDC_IDC_EPISODE_DURATION: 0 S
MDC_IDC_EPISODE_DURATION: 4 S
MDC_IDC_EPISODE_ID: 14
MDC_IDC_EPISODE_ID: 15
MDC_IDC_EPISODE_ID: 16
MDC_IDC_EPISODE_TYPE: NORMAL
MDC_IDC_LEAD_CONNECTION_STATUS: NORMAL
MDC_IDC_LEAD_IMPLANT_DT: NORMAL
MDC_IDC_LEAD_LOCATION: NORMAL
MDC_IDC_LEAD_LOCATION_DETAIL_1: NORMAL
MDC_IDC_LEAD_MFG: NORMAL
MDC_IDC_LEAD_MODEL: NORMAL
MDC_IDC_LEAD_POLARITY_TYPE: NORMAL
MDC_IDC_LEAD_SERIAL: NORMAL
MDC_IDC_MSMT_BATTERY_DTM: NORMAL
MDC_IDC_MSMT_BATTERY_REMAINING_LONGEVITY: 172 MO
MDC_IDC_MSMT_BATTERY_RRT_TRIGGER: 2.62
MDC_IDC_MSMT_BATTERY_STATUS: NORMAL
MDC_IDC_MSMT_BATTERY_VOLTAGE: 3.2 V
MDC_IDC_MSMT_LEADCHNL_RV_IMPEDANCE_VALUE: 437 OHM
MDC_IDC_MSMT_LEADCHNL_RV_IMPEDANCE_VALUE: 703 OHM
MDC_IDC_MSMT_LEADCHNL_RV_PACING_THRESHOLD_AMPLITUDE: 0.75 V
MDC_IDC_MSMT_LEADCHNL_RV_PACING_THRESHOLD_PULSEWIDTH: 0.4 MS
MDC_IDC_MSMT_LEADCHNL_RV_SENSING_INTR_AMPL: 13.62 MV
MDC_IDC_MSMT_LEADCHNL_RV_SENSING_INTR_AMPL: 13.62 MV
MDC_IDC_PG_IMPLANT_DTM: NORMAL
MDC_IDC_PG_MFG: NORMAL
MDC_IDC_PG_MODEL: NORMAL
MDC_IDC_PG_SERIAL: NORMAL
MDC_IDC_PG_TYPE: NORMAL
MDC_IDC_SESS_CLINIC_NAME: NORMAL
MDC_IDC_SESS_DTM: NORMAL
MDC_IDC_SESS_TYPE: NORMAL
MDC_IDC_SET_BRADY_HYSTRATE: NORMAL
MDC_IDC_SET_BRADY_LOWRATE: 60 {BEATS}/MIN
MDC_IDC_SET_BRADY_MAX_SENSOR_RATE: 130 {BEATS}/MIN
MDC_IDC_SET_BRADY_MODE: NORMAL
MDC_IDC_SET_LEADCHNL_RV_PACING_AMPLITUDE: 2 V
MDC_IDC_SET_LEADCHNL_RV_PACING_ANODE_ELECTRODE_1: NORMAL
MDC_IDC_SET_LEADCHNL_RV_PACING_ANODE_LOCATION_1: NORMAL
MDC_IDC_SET_LEADCHNL_RV_PACING_CAPTURE_MODE: NORMAL
MDC_IDC_SET_LEADCHNL_RV_PACING_CATHODE_ELECTRODE_1: NORMAL
MDC_IDC_SET_LEADCHNL_RV_PACING_CATHODE_LOCATION_1: NORMAL
MDC_IDC_SET_LEADCHNL_RV_PACING_POLARITY: NORMAL
MDC_IDC_SET_LEADCHNL_RV_PACING_PULSEWIDTH: 0.4 MS
MDC_IDC_SET_LEADCHNL_RV_SENSING_ANODE_ELECTRODE_1: NORMAL
MDC_IDC_SET_LEADCHNL_RV_SENSING_ANODE_LOCATION_1: NORMAL
MDC_IDC_SET_LEADCHNL_RV_SENSING_CATHODE_ELECTRODE_1: NORMAL
MDC_IDC_SET_LEADCHNL_RV_SENSING_CATHODE_LOCATION_1: NORMAL
MDC_IDC_SET_LEADCHNL_RV_SENSING_POLARITY: NORMAL
MDC_IDC_SET_LEADCHNL_RV_SENSING_SENSITIVITY: 0.9 MV
MDC_IDC_SET_ZONE_DETECTION_INTERVAL: 400 MS
MDC_IDC_SET_ZONE_STATUS: NORMAL
MDC_IDC_SET_ZONE_TYPE: NORMAL
MDC_IDC_SET_ZONE_VENDOR_TYPE: NORMAL
MDC_IDC_STAT_BRADY_DTM_END: NORMAL
MDC_IDC_STAT_BRADY_DTM_START: NORMAL
MDC_IDC_STAT_BRADY_RV_PERCENT_PACED: 82.72 %
MDC_IDC_STAT_EPISODE_RECENT_COUNT: 0
MDC_IDC_STAT_EPISODE_RECENT_COUNT: 0
MDC_IDC_STAT_EPISODE_RECENT_COUNT: 3
MDC_IDC_STAT_EPISODE_RECENT_COUNT_DTM_END: NORMAL
MDC_IDC_STAT_EPISODE_RECENT_COUNT_DTM_START: NORMAL
MDC_IDC_STAT_EPISODE_TOTAL_COUNT: 0
MDC_IDC_STAT_EPISODE_TOTAL_COUNT: 0
MDC_IDC_STAT_EPISODE_TOTAL_COUNT: 16
MDC_IDC_STAT_EPISODE_TOTAL_COUNT_DTM_END: NORMAL
MDC_IDC_STAT_EPISODE_TOTAL_COUNT_DTM_START: NORMAL
MDC_IDC_STAT_EPISODE_TYPE: NORMAL

## 2024-07-22 ENCOUNTER — TELEPHONE (OUTPATIENT)
Dept: CARDIOLOGY | Facility: CLINIC | Age: 84
End: 2024-07-22
Payer: COMMERCIAL

## 2024-07-22 ENCOUNTER — TELEPHONE (OUTPATIENT)
Dept: INTERNAL MEDICINE | Facility: CLINIC | Age: 84
End: 2024-07-22
Payer: COMMERCIAL

## 2024-07-22 NOTE — TELEPHONE ENCOUNTER
Provider Communication    Who is calling:   Panchito with [facility]  [phone] x.[phone]    Facility in which provider is associated:  [facility]    Reason for call:  comprehensive stool sample requested but it is very expensive for patient through [facility] because their testing is a little different than that of [facility].  Can you place orders for this to be done at  and results shared with [facility].   [facility] also faxing over orders for C.Diff testing    Okay to leave detailed message?:  Yes at Other phone number:  [phone] x. [phone]

## 2024-07-22 NOTE — TELEPHONE ENCOUNTER
Received call from patient stating that she would need to reschedule her device check from tomorrow 7/23/24 as she has a cleaning service coming in.  She is also wondering if she can get this in [employer] instead of [place].  Called and left a message for patient notifying her that the appointment tomorrow is scheduled in [employer] currently.  Requested she call back to reschedule this appointment.  Device clinic phone number provided.     CLIFFORD Carrillo

## 2024-07-22 NOTE — TELEPHONE ENCOUNTER
Patient called to reschedule her device appt d/t scheduling conflicts. There is an opening tomorrow at the [facility] at 10:10a. This works for her so her appt was rescheduled. She will call in the AM if this time for some reason doesn't work.  JENNIFER RN

## 2024-07-23 ENCOUNTER — LAB (OUTPATIENT)
Dept: LAB | Facility: CLINIC | Age: 84
End: 2024-07-23
Payer: COMMERCIAL

## 2024-07-23 ENCOUNTER — ANTICOAGULATION THERAPY VISIT (OUTPATIENT)
Dept: ANTICOAGULATION | Facility: CLINIC | Age: 84
End: 2024-07-23

## 2024-07-23 ENCOUNTER — ANCILLARY PROCEDURE (OUTPATIENT)
Dept: CARDIOLOGY | Facility: CLINIC | Age: 84
End: 2024-07-23
Attending: INTERNAL MEDICINE
Payer: COMMERCIAL

## 2024-07-23 DIAGNOSIS — Z95.0 CARDIAC PACEMAKER IN SITU: ICD-10-CM

## 2024-07-23 DIAGNOSIS — I48.21 PERMANENT ATRIAL FIBRILLATION (H): Primary | ICD-10-CM

## 2024-07-23 DIAGNOSIS — I48.91 ATRIAL FIBRILLATION (H): ICD-10-CM

## 2024-07-23 DIAGNOSIS — R30.0 DYSURIA: ICD-10-CM

## 2024-07-23 DIAGNOSIS — R19.7 DIARRHEA, UNSPECIFIED TYPE: Primary | ICD-10-CM

## 2024-07-23 DIAGNOSIS — I48.20 CHRONIC ATRIAL FIBRILLATION (H): ICD-10-CM

## 2024-07-23 LAB — INR (EXTERNAL): 1.7 (ref 0.9–1.1)

## 2024-07-23 PROCEDURE — 93279 PRGRMG DEV EVAL PM/LDLS PM: CPT | Performed by: INTERNAL MEDICINE

## 2024-07-23 NOTE — TELEPHONE ENCOUNTER
Is this for further workup of diarrhea?  Symptom information needed for attachment of appropriate diagnosis code for the testing.  As well, please kindly clarify with [facility] if the lab order requested is for bacteria and virus stool panel?

## 2024-07-23 NOTE — PROGRESS NOTES
ANTICOAGULATION MANAGEMENT     Savanna Rehman 83 year old female is on warfarin with subtherapeutic INR result. (Goal INR 2.0-3.0)    Recent labs: (last 7 days)     07/23/24  1218   INR 1.7*       ASSESSMENT     Source(s): Chart Review and Home Care/Facility Nurse     Warfarin doses taken: Warfarin taken as instructed, confirmed no missed doses  Diet: No new diet changes identified  Medication/supplement changes: None noted  New illness, injury, or hospitalization: Yes: Continues to have diarrhea issues, reports ongoing for 3 weeks.   Signs or symptoms of bleeding or clotting: No  Previous result: Subtherapeutic  Additional findings: Upcoming surgery/procedure 8/12/24, TE sent to [facility] awaiting plan still.       PLAN     Recommended plan for ongoing change(s) affecting INR     Dosing Instructions: Increase your warfarin dose (8% change) with next INR in 1 week       Summary  As of 7/23/2024      Full warfarin instructions:  3.75 mg every Mon; 5 mg all other days   Next INR check:  7/30/2024               Telephone call with [facility] home care nurse who verbalizes understanding and agrees to plan    Orders given to  Homecare nurse/facility to recheck    Education provided: Please call back if any changes to your diet, medications or how you've been taking warfarin    Plan made per [facility] anticoagulation protocol    Iris Kemp RN  Anticoagulation Clinic  7/23/2024    _______________________________________________________________________     Anticoagulation Episode Summary       Current INR goal:  2.0-3.0   TTR:  53.9% (11.3 mo)   Target end date:  Indefinite   Send INR reminders to:  [facility]    Indications    Permanent atrial fibrillation (H) [I48.21]  Chronic atrial fibrillation (H) [I48.20]  Long term (current) use of anticoagulants (Resolved) [Z79.01]             Comments:  Home care              Anticoagulation Care Providers       Provider Role Specialty Phone number    Patti Suárez  MD [facility] [phone]

## 2024-07-24 ENCOUNTER — TELEPHONE (OUTPATIENT)
Dept: INTERNAL MEDICINE | Facility: CLINIC | Age: 84
End: 2024-07-24
Payer: COMMERCIAL

## 2024-07-24 DIAGNOSIS — R19.7 DIARRHEA, UNSPECIFIED: Primary | ICD-10-CM

## 2024-07-24 NOTE — TELEPHONE ENCOUNTER
Provider asked me to contact [facility] to gather more information. Spoke with Rahda, pt coordinator, who states the stool testing was requested by Dr Peña from [facility] to test for infection. Patient states she has had diarrhea for weeks, nausea and that her legs are full of fluid. Patient takes imodium after every bowel movement.    Asked Radha to fax over the comprehensive stool testing request so we can send that to our lab. That was [facility] will get the results faxed to them once it has been completed.  Radha agreed with that plan.    Comprehensive stool test orders arrived and were faxed to [facility] lab.

## 2024-07-25 LAB
MDC_IDC_LEAD_CONNECTION_STATUS: NORMAL
MDC_IDC_LEAD_IMPLANT_DT: NORMAL
MDC_IDC_LEAD_LOCATION: NORMAL
MDC_IDC_LEAD_LOCATION_DETAIL_1: NORMAL
MDC_IDC_LEAD_MFG: NORMAL
MDC_IDC_LEAD_MODEL: NORMAL
MDC_IDC_LEAD_POLARITY_TYPE: NORMAL
MDC_IDC_LEAD_SERIAL: NORMAL
MDC_IDC_MSMT_BATTERY_DTM: NORMAL
MDC_IDC_MSMT_BATTERY_REMAINING_LONGEVITY: 171 MO
MDC_IDC_MSMT_BATTERY_RRT_TRIGGER: 2.62
MDC_IDC_MSMT_BATTERY_STATUS: NORMAL
MDC_IDC_MSMT_BATTERY_VOLTAGE: 3.2 V
MDC_IDC_MSMT_LEADCHNL_RV_IMPEDANCE_VALUE: 418 OHM
MDC_IDC_MSMT_LEADCHNL_RV_IMPEDANCE_VALUE: 646 OHM
MDC_IDC_MSMT_LEADCHNL_RV_PACING_THRESHOLD_AMPLITUDE: 0.62 V
MDC_IDC_MSMT_LEADCHNL_RV_PACING_THRESHOLD_PULSEWIDTH: 0.4 MS
MDC_IDC_MSMT_LEADCHNL_RV_SENSING_INTR_AMPL: 10.62 MV
MDC_IDC_MSMT_LEADCHNL_RV_SENSING_INTR_AMPL: 13 MV
MDC_IDC_PG_IMPLANT_DTM: NORMAL
MDC_IDC_PG_MFG: NORMAL
MDC_IDC_PG_MODEL: NORMAL
MDC_IDC_PG_SERIAL: NORMAL
MDC_IDC_PG_TYPE: NORMAL
MDC_IDC_SESS_CLINIC_NAME: NORMAL
MDC_IDC_SESS_DTM: NORMAL
MDC_IDC_SESS_TYPE: NORMAL
MDC_IDC_SET_BRADY_HYSTRATE: NORMAL
MDC_IDC_SET_BRADY_LOWRATE: 60 {BEATS}/MIN
MDC_IDC_SET_BRADY_MAX_SENSOR_RATE: 130 {BEATS}/MIN
MDC_IDC_SET_BRADY_MODE: NORMAL
MDC_IDC_SET_LEADCHNL_RV_PACING_AMPLITUDE: 2 V
MDC_IDC_SET_LEADCHNL_RV_PACING_ANODE_ELECTRODE_1: NORMAL
MDC_IDC_SET_LEADCHNL_RV_PACING_ANODE_LOCATION_1: NORMAL
MDC_IDC_SET_LEADCHNL_RV_PACING_CAPTURE_MODE: NORMAL
MDC_IDC_SET_LEADCHNL_RV_PACING_CATHODE_ELECTRODE_1: NORMAL
MDC_IDC_SET_LEADCHNL_RV_PACING_CATHODE_LOCATION_1: NORMAL
MDC_IDC_SET_LEADCHNL_RV_PACING_POLARITY: NORMAL
MDC_IDC_SET_LEADCHNL_RV_PACING_PULSEWIDTH: 0.4 MS
MDC_IDC_SET_LEADCHNL_RV_SENSING_ANODE_ELECTRODE_1: NORMAL
MDC_IDC_SET_LEADCHNL_RV_SENSING_ANODE_LOCATION_1: NORMAL
MDC_IDC_SET_LEADCHNL_RV_SENSING_CATHODE_ELECTRODE_1: NORMAL
MDC_IDC_SET_LEADCHNL_RV_SENSING_CATHODE_LOCATION_1: NORMAL
MDC_IDC_SET_LEADCHNL_RV_SENSING_POLARITY: NORMAL
MDC_IDC_SET_LEADCHNL_RV_SENSING_SENSITIVITY: 0.9 MV
MDC_IDC_SET_ZONE_DETECTION_INTERVAL: 400 MS
MDC_IDC_SET_ZONE_STATUS: NORMAL
MDC_IDC_SET_ZONE_TYPE: NORMAL
MDC_IDC_SET_ZONE_VENDOR_TYPE: NORMAL
MDC_IDC_STAT_BRADY_DTM_END: NORMAL
MDC_IDC_STAT_BRADY_DTM_START: NORMAL
MDC_IDC_STAT_BRADY_RV_PERCENT_PACED: 83.52 %
MDC_IDC_STAT_EPISODE_RECENT_COUNT: 0
MDC_IDC_STAT_EPISODE_RECENT_COUNT: 0
MDC_IDC_STAT_EPISODE_RECENT_COUNT: 3
MDC_IDC_STAT_EPISODE_RECENT_COUNT_DTM_END: NORMAL
MDC_IDC_STAT_EPISODE_RECENT_COUNT_DTM_START: NORMAL
MDC_IDC_STAT_EPISODE_TOTAL_COUNT: 0
MDC_IDC_STAT_EPISODE_TOTAL_COUNT: 0
MDC_IDC_STAT_EPISODE_TOTAL_COUNT: 16
MDC_IDC_STAT_EPISODE_TOTAL_COUNT_DTM_END: NORMAL
MDC_IDC_STAT_EPISODE_TOTAL_COUNT_DTM_START: NORMAL
MDC_IDC_STAT_EPISODE_TYPE: NORMAL

## 2024-07-25 NOTE — TELEPHONE ENCOUNTER
"TYRON-PROCEDURAL ANTICOAGULATION  MANAGEMENT    ASSESSMENT     Warfarin interruption plan for EGD on 2024.    Indication for Anticoagulation: Atrial Fibrillation    [id number] = 5 (CHF, Age >= 75, Diabetes, and Female)  CHF = EF >40%, noted 55-60% per  cardio note therefore not added to score       Tyron-Procedure Risk stratification for thromboembolism: moderate ( Chest guidelines and  ACC periprocedure pathway for NVAF Expert Consensus)    AFIB:  CHEST Perioperative Management guidelines recommends against bridging for patients with atrial fibrillation except in high risk stratification patients.  NVAF: 2017 ACC periprocedure pathway for NVAF advises likely NO bridge for moderate risk stratification ([id number] score 5-6)  without a hx of stroke, TIA or systemic embolism    RECOMMENDATION     Pre-Procedure:  Hold warfarin for 4 days, until after procedure startin2024   No Bridge    Post-Procedure:  Resume warfarin dose if okay with provider doing procedure on night of procedure, 2024 PM: 7.5mg  Recheck INR ~ 7 days after resuming warfarin     Plan routed to referring provider for approval  ?   Jayleen Oconnor [facility]    SUBJECTIVE/OBJECTIVE     Savanna Rehman, a 83 year old female    Goal INR Range: 2.0-3.0     Patient bridged in past: Yes: historically 2016      Wt Readings from Last 3 Encounters:   24 108 kg (238 lb)   24 113.8 kg (250 lb 14.1 oz)   24 114 kg (251 lb 5.2 oz)      Ideal body weight: 57.6 kg (126 lb 14.9 oz)  Adjusted ideal body weight: 77.7 kg (171 lb 5.7 oz)     Estimated body mass index is 39.3 kg/m  as calculated from the following:    Height as of 24: 1.657 m (5' 5.25\").    Weight as of 24: 108 kg (238 lb).    Lab Results   Component Value Date    INR 1.7 (A) 2024    INR 1.5 (A) 2024    INR 1.4 (A) 2024     Lab Results   Component Value Date    HGB 13.5 2024    HCT 39.6 2024     (L) " 07/05/2024     Lab Results   Component Value Date    CR 1.29 (H) 07/05/2024    CR 1.17 (H) 06/28/2024    CR 1.11 (H) 06/07/2024     Estimated Creatinine Clearance: 42 mL/min (A) (based on SCr of 1.29 mg/dL (H)).

## 2024-07-26 ENCOUNTER — OFFICE VISIT (OUTPATIENT)
Dept: INTERNAL MEDICINE | Facility: CLINIC | Age: 84
End: 2024-07-26
Payer: COMMERCIAL

## 2024-07-26 VITALS
OXYGEN SATURATION: 97 % | TEMPERATURE: 97.7 F | WEIGHT: 244 LBS | RESPIRATION RATE: 18 BRPM | SYSTOLIC BLOOD PRESSURE: 128 MMHG | HEIGHT: 65 IN | BODY MASS INDEX: 40.65 KG/M2 | DIASTOLIC BLOOD PRESSURE: 78 MMHG | HEART RATE: 82 BPM

## 2024-07-26 DIAGNOSIS — N18.31 TYPE 2 DIABETES MELLITUS WITH STAGE 3A CHRONIC KIDNEY DISEASE, WITHOUT LONG-TERM CURRENT USE OF INSULIN (H): ICD-10-CM

## 2024-07-26 DIAGNOSIS — K74.69 OTHER CIRRHOSIS OF LIVER (H): ICD-10-CM

## 2024-07-26 DIAGNOSIS — I48.20 CHRONIC ATRIAL FIBRILLATION (H): ICD-10-CM

## 2024-07-26 DIAGNOSIS — E11.22 TYPE 2 DIABETES MELLITUS WITH STAGE 3A CHRONIC KIDNEY DISEASE, WITHOUT LONG-TERM CURRENT USE OF INSULIN (H): ICD-10-CM

## 2024-07-26 DIAGNOSIS — Z01.818 PREOP GENERAL PHYSICAL EXAM: Primary | ICD-10-CM

## 2024-07-26 DIAGNOSIS — R10.32 LEFT LOWER QUADRANT PAIN: ICD-10-CM

## 2024-07-26 PROCEDURE — 99214 OFFICE O/P EST MOD 30 MIN: CPT | Performed by: INTERNAL MEDICINE

## 2024-07-26 ASSESSMENT — PATIENT HEALTH QUESTIONNAIRE - PHQ9
SUM OF ALL RESPONSES TO PHQ QUESTIONS 1-9: 1
10. IF YOU CHECKED OFF ANY PROBLEMS, HOW DIFFICULT HAVE THESE PROBLEMS MADE IT FOR YOU TO DO YOUR WORK, TAKE CARE OF THINGS AT HOME, OR GET ALONG WITH OTHER PEOPLE: NOT DIFFICULT AT ALL
SUM OF ALL RESPONSES TO PHQ QUESTIONS 1-9: 1

## 2024-07-26 NOTE — PATIENT INSTRUCTIONS
Pre-Procedure:  Hold warfarin for 4 days, until after procedure startin2024   No Bridge     Post-Procedure:  Resume warfarin dose if okay with provider doing procedure on night of procedure, 2024 PM: 7.5mg  Recheck INR ~ 7 days after resuming warfarin     please do not take the glipizide on the morning of the procedure.

## 2024-07-26 NOTE — PROGRESS NOTES
Answers submitted by the patient for this visit:  Patient Health Questionnaire (Submitted on 7/26/2024)  If you checked off any problems, how difficult have these problems made it for you to do your work, take care of things at home, or get along with other people?: Not difficult at all  PHQ9 TOTAL SCORE: 1  Preoperative Evaluation  [place] [address]  [address]  [facility] [address]  Phone: [phone]  Primary Provider: Taylor Payan MD  Pre-op Performing Provider: Taylor Payan MD  Jul 26, 2024 7/26/2024   Surgical Information   What procedure is being done? esophagogastrodenoscopy   Facility or Hospital where procedure/surgery will be performed: [facility]   Who is doing the procedure / surgery? [facility]   Date of surgery / procedure: 58042   Time of surgery / procedure: tbd   Where do you plan to recover after surgery? at home with family        Fax number for surgical facility: Note does not need to be faxed, will be available electronically in Epic.    Assessment & Plan     The proposed surgical procedure is considered LOW risk.    Preop general physical exam      Other cirrhosis of liver (H)  Will be undergoing endoscopic procedure for screening of esophageal varices.    Type 2 diabetes mellitus with stage 3a chronic kidney disease, without long-term current use of insulin (H)  Most recent A1c labs reviewed.  A1c at target.  Continue glipizide at the current dose.  Patient will skip the glipizide dose on the morning of the procedure.  Medication instructions provided on AVS.    Left lower quadrant pain  Will be completing stool testing as ordered by [facility].    Chronic atrial fibrillation (H)  Warfarin interruption in preparation for the upcoming procedure discussed with the patient.       Implanted Device   - Type of device: Pacemaker Patient advised to bring device information on day of surgery.       - No identified additional risk factors other than previously  addressed    Antiplatelet or Anticoagulation Medication Instructions   - warfarin: Patient will hold warfarin 4 days prior to procedure date.  Resume warfarin on the night of the procedure if okay with provider during procedure    Additional Medication Instructions  Patient will not take glipizide on the morning of the procedure.    Recommendation  Approval given to proceed with proposed procedure, without further diagnostic evaluation.    Mamadou Corrales is a 83 year old, presenting for the following:  Pre-Op Exam          7/26/2024     1:39 PM   Additional Questions   Roomed by BALBINA Paez related to upcoming procedure: History of alcoholic cirrhosis.  Will be undergoing EGD for screening of esophageal varices        7/26/2024   Pre-Op Questionnaire   Have you ever had a heart attack or stroke? No   Have you ever had surgery on your heart or blood vessels, such as a stent placement, a coronary artery bypass, or surgery on an artery in your head, neck, heart, or legs? (!) YES    Do you have chest pain with activity? No   Do you have a history of heart failure? No   Do you currently have a cold, bronchitis or symptoms of other infection? No   Do you have a cough, shortness of breath, or wheezing? No   Do you or anyone in your family have previous history of blood clots? (!) UNKNOWN    Do you or does anyone in your family have a serious bleeding problem such as prolonged bleeding following surgeries or cuts? No   Have you ever had problems with anemia or been told to take iron pills? (!) YES    Have you had any abnormal blood loss such as black, tarry or bloody stools, or abnormal vaginal bleeding? No   Have you ever had a blood transfusion? (!) YES   Have you ever had a transfusion reaction? No   Are you willing to have a blood transfusion if it is medically needed before, during, or after your surgery? Yes   Have you or any of your relatives ever had problems with anesthesia? No   Do you have sleep  apnea, excessive snoring or daytime drowsiness? (!) YES   Do you have a CPAP machine? Yes   Do you have any artifical heart valves or other implanted medical devices like a pacemaker, defibrillator, or continuous glucose monitor? (!) YES   What type of device do you have? unknow   Name of the clinic that manages your device cardiology   Do you have artificial joints? (!) YES   Are you allergic to latex? No        Health Care Directive  Patient has a Health Care Directive on file      Preoperative Review of    reviewed - no record of controlled substances prescribed.          Patient Active Problem List    Diagnosis Date Noted    Class 2 severe obesity due to excess calories with serious comorbidity in adult (H) 06/07/2024     Priority: Medium    Major depression, recurrent (H) 04/24/2024     Priority: Medium    Thrombocytopenia (H24) 04/15/2024     Priority: Medium    Rash and nonspecific skin eruption 04/15/2024     Priority: Medium    Diverticulitis of large intestine with abscess, unspecified bleeding status 04/12/2024     Priority: Medium    S/P placement of cardiac pacemaker: 4/5/2024 04/12/2024     Priority: Medium    Anticoagulated on Coumadin 04/12/2024     Priority: Medium    Cognitive impairment 04/12/2024     Priority: Medium    Adjustment disorder with anxious mood 04/12/2024     Priority: Medium    Abnormal WBC count 04/12/2024     Priority: Medium    Chronic diarrhea 04/12/2024     Priority: Medium    Urinary incontinence, unspecified type 04/12/2024     Priority: Medium    Atrial fibrillation with slow ventricular response (H) 04/04/2024     Priority: Medium    Diverticulitis 04/01/2024     Priority: Medium    Colonic diverticular abscess 04/01/2024     Priority: Medium    Hematoma of buttock 04/01/2024     Priority: Medium    Hypervolemia, unspecified hypervolemia type 02/22/2024     Priority: Medium    Congestive heart failure, unspecified HF chronicity, unspecified heart failure type (H)  02/22/2024     Priority: Medium    Confusion associated with infection 09/21/2023     Priority: Medium    Acute encephalopathy 09/21/2023     Priority: Medium    Diarrhea 08/28/2023     Priority: Medium    Cirrhosis of liver (H) 07/25/2023     Priority: Medium    Abnormal liver enzymes 06/29/2023     Priority: Medium    Contusion of lower back and pelvis, subsequent encounter 06/26/2023     Priority: Medium    Muscle weakness (generalized) 06/26/2023     Priority: Medium    Atrial fibrillation, unspecified type (H) 06/26/2023     Priority: Medium    Long term current use of anticoagulant therapy 06/26/2023     Priority: Medium    Hypertensive heart disease with chronic diastolic congestive heart failure (H) 06/26/2023     Priority: Medium    Skin yeast infection 06/26/2023     Priority: Medium    Repeated falls 06/19/2023     Priority: Medium    Right-sided chest wall pain 06/19/2023     Priority: Medium    Contusion of right thigh, subsequent encounter 06/19/2023     Priority: Medium    Dizziness and giddiness 06/19/2023     Priority: Medium    Physical deconditioning 06/19/2023     Priority: Medium    Acute posthemorrhagic anemia 06/19/2023     Priority: Medium    Obstructive sleep apnea (adult) (pediatric) 06/19/2023     Priority: Medium    Gastro-esophageal reflux disease without esophagitis 06/19/2023     Priority: Medium    History of Clostridium difficile colitis 06/19/2023     Priority: Medium    Elevated troponin 06/13/2023     Priority: Medium    Anemia due to blood loss, acute 06/13/2023     Priority: Medium    Subtherapeutic international normalized ratio (INR) 06/13/2023     Priority: Medium    Traumatic hematoma of buttock, subsequent encounter 06/13/2023     Priority: Medium    Chest pain, unspecified type 06/13/2023     Priority: Medium    Loose stools 06/02/2023     Priority: Medium    Lower abdominal pain 06/02/2023     Priority: Medium    Abnormal feces 04/18/2023     Priority: Medium     Adjustment disorder with mixed anxiety and depressed mood 12/05/2022     Priority: Medium    Adjustment disorder with anxiety 11/28/2022     Priority: Medium    Bony pelvic pain 07/06/2022     Priority: Medium    C. difficile colitis, recurrent -- she needs to take Vanco 125 bid anytime she is on a different Abx  03/18/2022     Priority: Medium    Chronic kidney disease, stage 3 10/07/2021     Priority: Medium    Ascending aorta dilatation (H24) 04/20/2021     Priority: Medium    Nonrheumatic tricuspid valve regurgitation 04/20/2021     Priority: Medium    Anemia 12/16/2020     Priority: Medium     Formatting of this note might be different from the original.  Iron Deficiency anemia      Gastroesophageal reflux disease 12/16/2020     Priority: Medium    Gout 12/16/2020     Priority: Medium    Type 2 diabetes mellitus with stage 3a chronic kidney disease, without long-term current use of insulin (H) 09/03/2020     Priority: Medium    Vitamin D deficiency      Priority: Medium    Hyperlipidemia LDL goal <100 12/10/2019     Priority: Medium    Permanent atrial fibrillation (H) 10/23/2019     Priority: Medium    Chronic atrial fibrillation (H) 10/21/2019     Priority: Medium    Recurrent UTI 08/13/2019     Priority: Medium    Urgency-frequency syndrome 08/13/2019     Priority: Medium    Urgency incontinence 08/13/2019     Priority: Medium    Candidiasis of skin 08/13/2019     Priority: Medium    CRISTIANA (obstructive sleep apnea)  08/29/2018     Priority: Medium    Noninfectious gastroenteritis 08/14/2015     Priority: Medium    Diverticular disease of colon 07/28/2015     Priority: Medium    Angioedema 03/09/2015     Priority: Medium    Iron deficiency anemia 11/19/2014     Priority: Medium      Past Medical History:   Diagnosis Date    Acute encephalopathy 09/21/2023    Anemia     Iron Deficiency anemia    Atrial fibrillation (H)     CAD (coronary artery disease)     non-obstructive    Chronic pain     neck, low back, legs     Congestive heart failure (H)     Degenerative disk disease     Diabetes (H)     Fibromyalgia     Gastro-oesophageal reflux disease     Gout     Hiatal hernia     Mumps     Neuropathy     CRISTIANA (obstructive sleep apnea) - CPAP     Palpitations     Pernicious anemia     Sleep apnea     uses CPAP.    Urinary incontinence     Vitamin D deficiency      Past Surgical History:   Procedure Laterality Date    APPENDECTOMY      CHOLECYSTECTOMY      COLONOSCOPY  3/15/2011    COLONOSCOPY N/A 3/15/2023    Procedure: COLONOSCOPY, WITH POLYPECTOMY AND BIOPSY;  Surgeon: Maci Coronel MD;  Location:  GI    COLONOSCOPY N/A 3/15/2023    Procedure: COLONOSCOPY, FLEXIBLE, WITH LESION REMOVAL USING SNARE;  Surgeon: Maci Coronel MD;  Location:  GI    CORONARY ANGIOGRAPHY ADULT ORDER      CYSTOSCOPY, BIOPSY BLADDER, COMBINED N/A 7/13/2020    Procedure: CYSTOSCOPY, WITH BLADDER BIOPSY;  Surgeon: Deisy Wilson MD;  Location: [facility]    EP PACEMAKER DEVICE & LEAD IMPLANT- RIGHT ATRIAL & RIGHT VENTRICULAR Left 4/5/2024    Procedure: Pacemaker Device & Lead Implant - Right Atrial & Right Ventricular;  Surgeon: Raul Plunkett MD;  Location:  HEART CARDIAC CATH LAB    HEART CATH LEFT HEART CATH  12/30/16    medication management    HYSTERECTOMY TOTAL ABDOMINAL      Knee replacement NOS Left     LAPAROSCOPIC NISSEN FUNDOPLICATION N/A 2/4/2015    Procedure: LAPAROSCOPIC NISSEN FUNDOPLICATION;  Surgeon: Armando Ansari MD;  Location:  OR    TONSILLECTOMY      TRANSPOSITION ULNAR NERVE (ELBOW)       Current Outpatient Medications   Medication Sig Dispense Refill    acetaminophen (TYLENOL) 500 MG tablet Take 1,000 mg by mouth every 6 hours as needed for mild pain      butalbital-aspirin-caffeine (FIORINAL) -40 MG capsule TAKE 1 CAPSULE BY MOUTH EVERY 4 HOURS AS NEEDED FOR HEADACHES OR  MIGRAINE 10 capsule 0    Cholecalciferol (VITAMIN D-3) 125 MCG (5000 UT) TABS Take 5,000 Units by mouth daily      EPINEPHrine (ANY  BX GENERIC EQUIV) 0.3 MG/0.3ML injection 2-pack Inject 0.3 mLs (0.3 mg) into the muscle as needed for anaphylaxis May repeat one time in 5-15 minutes if response to initial dose is inadequate. 2 each 3    escitalopram (LEXAPRO) 10 MG tablet Take 1 tablet (10 mg) by mouth at bedtime 90 tablet 1    famotidine (PEPCID) 20 MG tablet Take 20 mg by mouth 2 times daily      furosemide (LASIX) 40 MG tablet Take 1 tablet (40 mg) by mouth daily for 90 days 90 tablet 0    glipiZIDE (GLUCOTROL XL) 2.5 MG 24 hr tablet Take 1 tablet by mouth twice daily 180 tablet 3    indapamide (LOZOL) 2.5 MG tablet Take 1 tablet (2.5 mg) by mouth every morning 90 tablet 1    loperamide (IMODIUM) 2 MG capsule TAKE 1 CAPSULE BY MOUTH 4 TIMES DAILY AS NEEDED FOR DIARRHEA 60 capsule 0    melatonin 3 MG tablet Take 3 mg by mouth nightly as needed for sleep      warfarin ANTICOAGULANT (COUMADIN) 2.5 MG tablet Take 3.75 mg by mouth six times a week Daily except on Mondays      warfarin ANTICOAGULANT (COUMADIN) 2.5 MG tablet Take 2.5 mg by mouth once a week On Monday      Warfarin Sodium (COUMADIN PO) Take by mouth daily   4/12/24: INR 1.6 today  Take coumadin 7.5 mg on 4/12 and 4/13 and then 5mg on 4/14 and 4/15 then check INR on 4/16 4/16/2024 INR 2.2 give 3.5mg coumadin daily and check INR on 4/19 4/19/2024:  INR IS 2.1  RESUME HOME REGIMEN OF COUMADIN  as listed         Allergies   Allergen Reactions    Augmented Betamethasone Diprop [Betamethasone] Other (See Comments)     Severe yeast infection    Petrolatum Anaphylaxis and Swelling     Rash and swelling    Shellfish-Derived Products Anaphylaxis     Tongue swelling    Aspirin Swelling     tiongue swelling    Bacitracin      Rash swelling    Bactrim [Sulfamethoxazole-Trimethoprim] Dizziness    Coumadin [Warfarin] Swelling     Leg swelling    Darvon [Propoxyphene] Swelling     Throat closes    Dilaudid [Hydromorphone]      No side effects from fentanyl June 2023    Levaquin [Levofloxacin]  "Swelling     Tongue swelling    Lidocaine      Facial swelling     Morphine Unknown     Per [facility]    Neomycin Swelling     rash    Neosporin [Neomycin-Polymyxin-Gramicidin] Swelling     rash    Nitrofurantoin      SOB, GI upset,    Oxycodone      Severe itching    Percocet [Oxycodone-Acetaminophen] Unknown    Percodan [Oxycodone-Aspirin]      Severe itching    Polymyxin B     Pramoxine     Tramadol     Vicodin [Hydrocodone-Acetaminophen]      Severe itching      Xarelto [Rivaroxaban]     Adhesive Tape Rash     Band aids     Codeine Rash    Hydrocortisone Rash and Swelling    Other Environmental Allergy Rash     Adhesive tape   Band aids         Social History     Tobacco Use    Smoking status: Former     Current packs/day: 0.00     Average packs/day: 0.5 packs/day for 50.0 years (25.0 ttl pk-yrs)     Types: Cigarettes     Start date: 1964     Quit date: 2014     Years since quittin.9     Passive exposure: Past    Smokeless tobacco: Never   Substance Use Topics    Alcohol use: Yes     Comment: couple a month       History   Drug Use Unknown             Review of Systems  Constitutional, HEENT, cardiovascular, pulmonary, gi and gu systems are negative, except as otherwise noted.    Objective    /78   Pulse 82   Temp 97.7  F (36.5  C)   Resp 18   Ht 1.651 m (5' 5\")   Wt 110.7 kg (244 lb)   LMP  (LMP Unknown)   SpO2 97%   Breastfeeding No   BMI 40.60 kg/m     Estimated body mass index is 40.6 kg/m  as calculated from the following:    Height as of this encounter: 1.651 m (5' 5\").    Weight as of this encounter: 110.7 kg (244 lb).  Physical Exam  GENERAL: alert and no distress  RESP: lungs clear to auscultation - no rales, rhonchi or wheezes  CV: regular rate and rhythm, normal S1 S2  MS: no gross musculoskeletal defects noted, no edema  NEURO: Normal strength and tone, mentation intact and speech normal  PSYCH: mentation appears normal, affect normal.    Recent Labs "   Lab Test 07/23/24  1218 07/16/24  1206 07/05/24  1547 07/05/24  1527 07/02/24  1251 06/28/24  1020 02/22/24  1500 02/08/24  1220   HGB  --   --   --  13.5  --  11.9   < >  --    PLT  --   --   --  131*  --  120*   < >  --    INR 1.7* 1.5*  --   --    < > 1.40*   < >  --    NA  --   --   --  139  --  138   < > 138   POTASSIUM  --   --   --  4.4  --  3.9   < > 4.0   CR  --   --   --  1.29*  --  1.17*   < > 1.02*   A1C  --   --  6.3*  --   --   --   --  6.1*    < > = values in this interval not displayed.        Diagnostics  No labs were ordered during this visit.   No EKG this visit, completed in the last 90 days.    Revised Cardiac Risk Index (RCRI)  The patient has the following serious cardiovascular risks for perioperative complications:   - No serious cardiac risks = 0 points     RCRI Interpretation: 0 points: Class I (very low risk - 0.4% complication rate)         Signed Electronically by: Taylor Payan MD  A copy of this evaluation report is provided to the requesting physician.

## 2024-07-29 NOTE — TELEPHONE ENCOUNTER
Spoke with Mahsa from [facility] and gave hold orders for procedure on 8/12/24.    JAMI JohnsonN, RN  Anticoagulation Clinic

## 2024-07-30 ENCOUNTER — ANTICOAGULATION THERAPY VISIT (OUTPATIENT)
Dept: ANTICOAGULATION | Facility: CLINIC | Age: 84
End: 2024-07-30
Payer: COMMERCIAL

## 2024-07-30 DIAGNOSIS — I48.21 PERMANENT ATRIAL FIBRILLATION (H): Primary | ICD-10-CM

## 2024-07-30 DIAGNOSIS — I48.20 CHRONIC ATRIAL FIBRILLATION (H): ICD-10-CM

## 2024-07-30 LAB — INR (EXTERNAL): 2.6

## 2024-07-30 NOTE — PROGRESS NOTES
ANTICOAGULATION MANAGEMENT     Savanna Rehman 83 year old female is on warfarin with therapeutic INR result. (Goal INR 2.0-3.0)    Recent labs: (last 7 days)     07/30/24  1232   INR 2.6       ASSESSMENT     Source(s): Chart Review and Home Care/Facility Nurse     Warfarin doses taken: Warfarin taken as instructed  Diet: No new diet changes identified  Medication/supplement changes: None noted  New illness, injury, or hospitalization: No  Signs or symptoms of bleeding or clotting: No  Previous result: Subtherapeutic  Additional findings: None       PLAN     Recommended plan for no diet, medication or health factor changes affecting INR     Dosing Instructions: Continue your current warfarin dose with next INR in 1 week       Summary  As of 7/30/2024      Full warfarin instructions:  3.75 mg every Mon; 5 mg all other days   Next INR check:  8/6/2024               Telephone call with [facility] home care nurse who agrees to plan and repeated back plan correctly    Orders given to  Homecare nurse/facility to recheck    Education provided: Please call back if any changes to your diet, medications or how you've been taking warfarin    Plan made per [facility] anticoagulation protocol    Azul Whitaker, RN  Anticoagulation Clinic  7/30/2024    _______________________________________________________________________     Anticoagulation Episode Summary       Current INR goal:  2.0-3.0   TTR:  53.2% (11.3 mo)   Target end date:  Indefinite   Send INR reminders to:  NIKKI CHASE    Indications    Permanent atrial fibrillation (H) [I48.21]  Chronic atrial fibrillation (H) [I48.20]  Long term (current) use of anticoagulants (Resolved) [Z79.01]             Comments:  Home care              Anticoagulation Care Providers       Provider Role Specialty Phone number    Patti Suárez MD Referring Internal Medicine [phone]

## 2024-07-31 DIAGNOSIS — Z53.9 DIAGNOSIS NOT YET DEFINED: Primary | ICD-10-CM

## 2024-07-31 PROCEDURE — G0179 MD RECERTIFICATION HHA PT: HCPCS | Performed by: INTERNAL MEDICINE

## 2024-08-02 ENCOUNTER — OFFICE VISIT (OUTPATIENT)
Dept: CARDIOLOGY | Facility: CLINIC | Age: 84
End: 2024-08-02
Payer: COMMERCIAL

## 2024-08-02 ENCOUNTER — LAB (OUTPATIENT)
Dept: LAB | Facility: CLINIC | Age: 84
End: 2024-08-02
Payer: COMMERCIAL

## 2024-08-02 VITALS
HEART RATE: 69 BPM | WEIGHT: 240.8 LBS | HEIGHT: 68 IN | BODY MASS INDEX: 36.49 KG/M2 | DIASTOLIC BLOOD PRESSURE: 65 MMHG | SYSTOLIC BLOOD PRESSURE: 117 MMHG

## 2024-08-02 DIAGNOSIS — Z95.0 CARDIAC PACEMAKER IN SITU: ICD-10-CM

## 2024-08-02 DIAGNOSIS — I89.0 LYMPHEDEMA: Primary | ICD-10-CM

## 2024-08-02 DIAGNOSIS — N18.31 TYPE 2 DIABETES MELLITUS WITH STAGE 3A CHRONIC KIDNEY DISEASE, WITHOUT LONG-TERM CURRENT USE OF INSULIN (H): ICD-10-CM

## 2024-08-02 DIAGNOSIS — I48.19 PERSISTENT ATRIAL FIBRILLATION (H): ICD-10-CM

## 2024-08-02 DIAGNOSIS — I50.9 CONGESTIVE HEART FAILURE, UNSPECIFIED HF CHRONICITY, UNSPECIFIED HEART FAILURE TYPE (H): ICD-10-CM

## 2024-08-02 DIAGNOSIS — E11.22 TYPE 2 DIABETES MELLITUS WITH STAGE 3A CHRONIC KIDNEY DISEASE, WITHOUT LONG-TERM CURRENT USE OF INSULIN (H): ICD-10-CM

## 2024-08-02 DIAGNOSIS — R19.7 DIARRHEA, UNSPECIFIED TYPE: ICD-10-CM

## 2024-08-02 PROCEDURE — 99214 OFFICE O/P EST MOD 30 MIN: CPT | Performed by: INTERNAL MEDICINE

## 2024-08-02 NOTE — LETTER
8/2/2024    Taylor Payan MD  [address] [facility] [address]    RE: Savanna Rehman       Dear Colleague,     I had the pleasure of seeing Savanna Rehman in the [facility].  HPI and Plan:   Savanna Rehman is a 83 year old female patient of Dr. Kenny,who presents with permanent atrial fibrillation, permanent pacemaker implant, type 2 diabetes, morbid obesity, nonobstructive coronary disease, sleep apnea and lower extremity edema.  I saw her back in June and was concerned about heart failure symptoms.  I had increased her Lasix dose because of some lower extremity edema and asked her to undergo some lab work.  Her NT BNP was actually lower than its ever been at 799, about 5 months ago it was 1, 177.  She has had increased diuretic effect with the escalated dose of Lasix and has lost about 4 pounds since her last visit but it looks like she is actually fluctuated in the last 2 months quite a bit.  She does continue to have some mild peripheral edema.  Her labs done July 5 suggest some volume depletion with elevated BUN at 23.6 and creatinine of 1.29.  We talked about other contributing factors to her lower extremity edema including possible lymphedema and cirrhotic liver disease.  I suggested a referral to lymphedema clinic.  She is not complaining of any shortness of breath or symptoms suggestive of orthopnea or PND.  She is scheduled to have an EGD to look for esophageal varices on August 12  She has mild peripheral edema on exam today.    Summary    1.  Lower extremity edema-likely a combination of venous insufficiency, cirrhosis and HFpEF.  She has had some modest improvement on escalated dose of Lasix.  I will recommend that she continue this but I will also refer her for lymphedema clinic    2.  Permanent atrial fibrillation with permanent pacemaker implant-on warfarin for CVA prophylaxis without bleeding complication.  Pacer interrogation on July 23 demonstrates normal  thresholds, battery life with no ventricular arrhythmias    I will recommend a follow-up visit in 6 months or as needed, please feel free to contact me with any questions given regards to her care       Today's clinic visit entailed:    31 minutes spent by me on the date of the encounter doing chart review, history and exam, documentation and further activities per the note  Provider  Link to [facility] Help Grid     The level of medical decision making during this visit was of moderate complexity.    Orders Placed This Encounter   Procedures     Lymphedema Therapy  Referral     No orders of the defined types were placed in this encounter.    There are no discontinued medications.      Encounter Diagnoses   Name Primary?     Lymphedema Yes     Cardiac pacemaker in situ      Persistent atrial fibrillation (H)      Type 2 diabetes mellitus with stage 3a chronic kidney disease, without long-term current use of insulin (H)      Congestive heart failure, unspecified HF chronicity, unspecified heart failure type (H)        CURRENT MEDICATIONS:  Current Outpatient Medications   Medication Sig Dispense Refill     acetaminophen (TYLENOL) 500 MG tablet Take 1,000 mg by mouth every 6 hours as needed for mild pain       butalbital-aspirin-caffeine (FIORINAL) -40 MG capsule TAKE 1 CAPSULE BY MOUTH EVERY 4 HOURS AS NEEDED FOR HEADACHES OR  MIGRAINE 10 capsule 0     Cholecalciferol (VITAMIN D-3) 125 MCG (5000 UT) TABS Take 5,000 Units by mouth daily (Patient taking differently: Take 4,000 Units by mouth daily)       EPINEPHrine (ANY BX GENERIC EQUIV) 0.3 MG/0.3ML injection 2-pack Inject 0.3 mLs (0.3 mg) into the muscle as needed for anaphylaxis May repeat one time in 5-15 minutes if response to initial dose is inadequate. 2 each 3     escitalopram (LEXAPRO) 10 MG tablet Take 1 tablet (10 mg) by mouth at bedtime 90 tablet 1     famotidine (PEPCID) 20 MG tablet Take 20 mg by mouth daily       furosemide (LASIX) 40 MG tablet  Take 1 tablet (40 mg) by mouth daily for 90 days (Patient taking differently: Take 40 mg by mouth 2 times daily Twice daily and as needed) 90 tablet 0     glipiZIDE (GLUCOTROL XL) 2.5 MG 24 hr tablet Take 1 tablet by mouth twice daily 180 tablet 3     indapamide (LOZOL) 2.5 MG tablet Take 1 tablet (2.5 mg) by mouth every morning 90 tablet 1     loperamide (IMODIUM) 2 MG capsule TAKE 1 CAPSULE BY MOUTH 4 TIMES DAILY AS NEEDED FOR DIARRHEA 60 capsule 0     warfarin ANTICOAGULANT (COUMADIN) 2.5 MG tablet Take 3.75 mg by mouth six times a week Daily except on Mondays       warfarin ANTICOAGULANT (COUMADIN) 2.5 MG tablet Take 2.5 mg by mouth once a week On Monday       Warfarin Sodium (COUMADIN PO) Take by mouth daily   4/12/24: INR 1.6 today  Take coumadin 7.5 mg on 4/12 and 4/13 and then 5mg on 4/14 and 4/15 then check INR on 4/16 4/16/2024 INR 2.2 give 3.5mg coumadin daily and check INR on 4/19 4/19/2024:  INR IS 2.1  RESUME HOME REGIMEN OF COUMADIN  as listed       melatonin 3 MG tablet Take 3 mg by mouth nightly as needed for sleep (Patient not taking: Reported on 8/2/2024)         ALLERGIES     Allergies   Allergen Reactions     Augmented Betamethasone Diprop [Betamethasone] Other (See Comments)     Severe yeast infection     Petrolatum Anaphylaxis and Swelling     Rash and swelling     Shellfish-Derived Products Anaphylaxis     Tongue swelling     Aspirin Swelling     tiongue swelling     Bacitracin      Rash swelling     Bactrim [Sulfamethoxazole-Trimethoprim] Dizziness     Coumadin [Warfarin] Swelling     Leg swelling     Darvon [Propoxyphene] Swelling     Throat closes     Dilaudid [Hydromorphone]      No side effects from fentanyl June 2023     Levaquin [Levofloxacin] Swelling     Tongue swelling     Lidocaine      Facial swelling      Morphine Unknown     [facility] 2/23/24     Neomycin Swelling     rash     Neosporin [Neomycin-Polymyxin-Gramicidin] Swelling     rash     Nitrofurantoin       SOB, GI upset,     Oxycodone      Severe itching     Percocet [Oxycodone-Acetaminophen] Unknown     Percodan [Oxycodone-Aspirin]      Severe itching     Polymyxin B      Pramoxine      Tramadol      Vicodin [Hydrocodone-Acetaminophen]      Severe itching       Xarelto [Rivaroxaban]      Adhesive Tape Rash     Band aids      Codeine Rash     Hydrocortisone Rash and Swelling     Other Environmental Allergy Rash     Adhesive tape   Band aids        PAST MEDICAL HISTORY:  Past Medical History:   Diagnosis Date     Acute encephalopathy 09/21/2023     Anemia     Iron Deficiency anemia     Atrial fibrillation (H)      CAD (coronary artery disease)     non-obstructive     Chronic pain     neck, low back, legs     Congestive heart failure (H)      Degenerative disk disease      Diabetes (H)      Fibromyalgia      Gastro-oesophageal reflux disease      Gout      Hiatal hernia      Mumps      Neuropathy      CRISTIANA (obstructive sleep apnea) - CPAP      Palpitations      Pernicious anemia      Sleep apnea     uses CPAP.     Urinary incontinence      Vitamin D deficiency        PAST SURGICAL HISTORY:  Past Surgical History:   Procedure Laterality Date     APPENDECTOMY       CHOLECYSTECTOMY       COLONOSCOPY  3/15/2011     COLONOSCOPY N/A 3/15/2023    Procedure: COLONOSCOPY, WITH POLYPECTOMY AND BIOPSY;  Surgeon: Maci Coronel MD;  Location:  GI     COLONOSCOPY N/A 3/15/2023    Procedure: COLONOSCOPY, FLEXIBLE, WITH LESION REMOVAL USING SNARE;  Surgeon: Maci Coronel MD;  Location:  GI     CORONARY ANGIOGRAPHY ADULT ORDER       CYSTOSCOPY, BIOPSY BLADDER, COMBINED N/A 7/13/2020    Procedure: CYSTOSCOPY, WITH BLADDER BIOPSY;  Surgeon: Deisy Wilson MD;  Location: [facility] OR     EP PACEMAKER DEVICE & LEAD IMPLANT- RIGHT ATRIAL & RIGHT VENTRICULAR Left 4/5/2024    Procedure: Pacemaker Device & Lead Implant - Right Atrial & Right Ventricular;  Surgeon: Raul Plunkett MD;  Location:  HEART CARDIAC CATH LAB      HEART CATH LEFT HEART CATH  16    medication management     HYSTERECTOMY TOTAL ABDOMINAL       Knee replacement NOS Left      LAPAROSCOPIC NISSEN FUNDOPLICATION N/A 2015    Procedure: LAPAROSCOPIC NISSEN FUNDOPLICATION;  Surgeon: Armando Ansari MD;  Location: [facility] OR     TONSILLECTOMY       TRANSPOSITION ULNAR NERVE (ELBOW)         FAMILY HISTORY:  Family History   Problem Relation Age of Onset     Alzheimer Disease Mother      Lung Cancer Father      No Known Problems Brother      No Known Problems Brother      No Known Problems Brother      Unknown/Adopted No family hx of        SOCIAL HISTORY:  Social History     Socioeconomic History     Marital status:      Spouse name: None     Number of children: None     Years of education: None     Highest education level: None   Tobacco Use     Smoking status: Former     Current packs/day: 0.00     Average packs/day: 0.5 packs/day for 50.0 years (25.0 ttl pk-yrs)     Types: Cigarettes     Start date: 1964     Quit date: 2014     Years since quittin.9     Passive exposure: Past     Smokeless tobacco: Never   Vaping Use     Vaping status: Never Used   Substance and Sexual Activity     Alcohol use: Not Currently     Drug use: Never     Sexual activity: Not Currently     Social Determinants of Health     Financial Resource Strain: Low Risk  (2024)    Financial Resource Strain      Within the past 12 months, have you or your family members you live with been unable to get utilities (heat, electricity) when it was really needed?: No   Food Insecurity: Low Risk  (2024)    Food Insecurity      Within the past 12 months, did you worry that your food would run out before you got money to buy more?: No      Within the past 12 months, did the food you bought just not last and you didn t have money to get more?: No   Transportation Needs: High Risk (2024)    Transportation Needs      Within the past 12 months, has lack of transportation kept you  "from medical appointments, getting your medicines, non-medical meetings or appointments, work, or from getting things that you need?: Yes   Stress: Stress Concern Present (1/27/2022)    [employer] - Occupational Stress Questionnaire      Feeling of Stress : Very much    Received from [facility], [facility]    Social Connections   Interpersonal Safety: Low Risk  (4/24/2024)    Interpersonal Safety      Do you feel physically and emotionally safe where you currently live?: Yes      Within the past 12 months, have you been hit, slapped, kicked or otherwise physically hurt by someone?: No      Within the past 12 months, have you been humiliated or emotionally abused in other ways by your partner or ex-partner?: No   Housing Stability: Low Risk  (2/1/2024)    Housing Stability      Do you have housing? : Yes      Are you worried about losing your housing?: No       Review of Systems:  Skin:  not assessed     Eyes:  not assessed    ENT:  not assessed    Respiratory:  Positive for cough;sleep apnea  Cardiovascular:    Positive for;edema  Gastroenterology: not assessed    Genitourinary:  not assessed    Musculoskeletal:  not assessed    Neurologic:  not assessed    Psychiatric:  not assessed    Heme/Lymph/Imm:  not assessed    Endocrine:  not assessed      Physical Exam:  Vitals: /65 (BP Location: Right arm, Patient Position: Sitting, Cuff Size: Adult Regular)   Pulse 69   Ht 1.727 m (5' 8\")   Wt 109.2 kg (240 lb 12.8 oz)   LMP  (LMP Unknown)   BMI 36.61 kg/m      Constitutional:  cooperative;in no acute distress morbidly obese      Skin:  warm and dry to the touch          Head:  normocephalic        Eyes:           Lymph:      ENT:  no pallor or cyanosis        Neck:  no carotid bruit   JVP not well visualized    Respiratory:  clear to auscultation;normal respiratory excursion    crackles right base and ?decr BS " left base    Cardiac:   irregularly irregular rhythm distant heart sounds no presence of murmur          not assessed this visit                                        GI:  abdomen soft;non-tender obese      Extremities and Muscular Skeletal:  no deformities, clubbing, cyanosis, erythema observed   bilateral LE edema;1+          Neurological:  affect appropriate;no gross motor deficits        Psych:  Alert and Oriented x 3        Recent Lab Results:  LIPID RESULTS:  Lab Results   Component Value Date    CHOL 136 10/19/2023    CHOL 169 02/22/2021    HDL 42 (L) 10/19/2023    HDL 40 (L) 02/22/2021    LDL 78 10/19/2023     (H) 02/22/2021    TRIG 79 10/19/2023    TRIG 110 02/22/2021       LIVER ENZYME RESULTS:  Lab Results   Component Value Date    AST 42 06/28/2024    AST 32 02/22/2021    ALT 24 06/28/2024    ALT 38 02/22/2021       CBC RESULTS:  Lab Results   Component Value Date    WBC 5.2 07/05/2024    WBC 6.1 02/22/2021    RBC 4.32 07/05/2024    RBC 4.89 02/22/2021    HGB 13.5 07/05/2024    HGB 14.7 02/22/2021    HCT 39.6 07/05/2024    HCT 44.5 02/22/2021    MCV 92 07/05/2024    MCV 91 02/22/2021    MCH 31.3 07/05/2024    MCH 30.1 02/22/2021    MCHC 34.1 07/05/2024    MCHC 33.0 02/22/2021    RDW 13.7 07/05/2024    RDW 14.2 02/22/2021     (L) 07/05/2024     02/22/2021       BMP RESULTS:  Lab Results   Component Value Date     07/05/2024     02/22/2021    POTASSIUM 4.4 07/05/2024    POTASSIUM 4.6 11/07/2022    POTASSIUM 4.2 02/22/2021    CHLORIDE 96 (L) 07/05/2024    CHLORIDE 105 11/07/2022    CHLORIDE 103 02/22/2021    CO2 32 (H) 07/05/2024    CO2 29 11/07/2022    CO2 29 02/22/2021    ANIONGAP 11 07/05/2024    ANIONGAP 6 11/07/2022    ANIONGAP 6 02/22/2021     (H) 07/05/2024     (H) 04/08/2024     (H) 11/07/2022     (H) 02/22/2021    BUN 23.6 (H) 07/05/2024    BUN 17 11/07/2022    BUN 14 02/22/2021    CR 1.29 (H) 07/05/2024    CR 0.97 02/22/2021     GFRESTIMATED 41 (L) 07/05/2024    GFRESTIMATED 51 (L) 03/13/2022    GFRESTIMATED 56 (L) 02/22/2021    GFRESTBLACK 64 02/22/2021    SAUL 9.3 07/05/2024    SAUL 9.9 02/22/2021        A1C RESULTS:  Lab Results   Component Value Date    A1C 6.3 (H) 07/05/2024    A1C 6.3 (H) 02/22/2021       INR RESULTS:  Lab Results   Component Value Date    INR 2.6 07/30/2024    INR 1.7 (A) 07/23/2024    INR 2.1 07/21/2021    INR 2.3 (A) 07/06/2021           CC  Aniyah Waller DO  [address]  [place] [id number]                  Thank you for allowing me to participate in the care of your patient.      Sincerely,     Aniyah Waller DO     [facility]  cc:   Aniyah Waller DO  [address]  [place] [id number]

## 2024-08-02 NOTE — PROGRESS NOTES
HPI and Plan:   Savanna Rehman is a 83 year old female patient of Dr. Kenny,who presents with permanent atrial fibrillation, permanent pacemaker implant, type 2 diabetes, morbid obesity, nonobstructive coronary disease, sleep apnea and lower extremity edema.  I saw her back in June and was concerned about heart failure symptoms.  I had increased her Lasix dose because of some lower extremity edema and asked her to undergo some lab work.  Her NT BNP was actually lower than its ever been at 799, about 5 months ago it was 1, 177.  She has had increased diuretic effect with the escalated dose of Lasix and has lost about 4 pounds since her last visit but it looks like she is actually fluctuated in the last 2 months quite a bit.  She does continue to have some mild peripheral edema.  Her labs done July 5 suggest some volume depletion with elevated BUN at 23.6 and creatinine of 1.29.  We talked about other contributing factors to her lower extremity edema including possible lymphedema and cirrhotic liver disease.  I suggested a referral to lymphedema clinic.  She is not complaining of any shortness of breath or symptoms suggestive of orthopnea or PND.  She is scheduled to have an EGD to look for esophageal varices on August 12  She has mild peripheral edema on exam today.    Summary    1.  Lower extremity edema-likely a combination of venous insufficiency, cirrhosis and HFpEF.  She has had some modest improvement on escalated dose of Lasix.  I will recommend that she continue this but I will also refer her for lymphedema clinic    2.  Permanent atrial fibrillation with permanent pacemaker implant-on warfarin for CVA prophylaxis without bleeding complication.  Pacer interrogation on July 23 demonstrates normal thresholds, battery life with no ventricular arrhythmias    I will recommend a follow-up visit in 6 months or as needed, please feel free to contact me with any questions given regards to her care       Today's clinic  visit entailed:    31 minutes spent by me on the date of the encounter doing chart review, history and exam, documentation and further activities per the note  Provider  Link to MDM Help Grid     The level of medical decision making during this visit was of moderate complexity.    Orders Placed This Encounter   Procedures    Lymphedema Therapy  Referral     No orders of the defined types were placed in this encounter.    There are no discontinued medications.      Encounter Diagnoses   Name Primary?    Lymphedema Yes    Cardiac pacemaker in situ     Persistent atrial fibrillation (H)     Type 2 diabetes mellitus with stage 3a chronic kidney disease, without long-term current use of insulin (H)     Congestive heart failure, unspecified HF chronicity, unspecified heart failure type (H)        CURRENT MEDICATIONS:  Current Outpatient Medications   Medication Sig Dispense Refill    acetaminophen (TYLENOL) 500 MG tablet Take 1,000 mg by mouth every 6 hours as needed for mild pain      butalbital-aspirin-caffeine (FIORINAL) -40 MG capsule TAKE 1 CAPSULE BY MOUTH EVERY 4 HOURS AS NEEDED FOR HEADACHES OR  MIGRAINE 10 capsule 0    Cholecalciferol (VITAMIN D-3) 125 MCG (5000 UT) TABS Take 5,000 Units by mouth daily (Patient taking differently: Take 4,000 Units by mouth daily)      EPINEPHrine (ANY BX GENERIC EQUIV) 0.3 MG/0.3ML injection 2-pack Inject 0.3 mLs (0.3 mg) into the muscle as needed for anaphylaxis May repeat one time in 5-15 minutes if response to initial dose is inadequate. 2 each 3    escitalopram (LEXAPRO) 10 MG tablet Take 1 tablet (10 mg) by mouth at bedtime 90 tablet 1    famotidine (PEPCID) 20 MG tablet Take 20 mg by mouth daily      furosemide (LASIX) 40 MG tablet Take 1 tablet (40 mg) by mouth daily for 90 days (Patient taking differently: Take 40 mg by mouth 2 times daily Twice daily and as needed) 90 tablet 0    glipiZIDE (GLUCOTROL XL) 2.5 MG 24 hr tablet Take 1 tablet by mouth twice  daily 180 tablet 3    indapamide (LOZOL) 2.5 MG tablet Take 1 tablet (2.5 mg) by mouth every morning 90 tablet 1    loperamide (IMODIUM) 2 MG capsule TAKE 1 CAPSULE BY MOUTH 4 TIMES DAILY AS NEEDED FOR DIARRHEA 60 capsule 0    warfarin ANTICOAGULANT (COUMADIN) 2.5 MG tablet Take 3.75 mg by mouth six times a week Daily except on Mondays      warfarin ANTICOAGULANT (COUMADIN) 2.5 MG tablet Take 2.5 mg by mouth once a week On Monday      Warfarin Sodium (COUMADIN PO) Take by mouth daily   4/12/24: INR 1.6 today  Take coumadin 7.5 mg on 4/12 and 4/13 and then 5mg on 4/14 and 4/15 then check INR on 4/16 4/16/2024 INR 2.2 give 3.5mg coumadin daily and check INR on 4/19 4/19/2024:  INR IS 2.1  RESUME HOME REGIMEN OF COUMADIN  as listed      melatonin 3 MG tablet Take 3 mg by mouth nightly as needed for sleep (Patient not taking: Reported on 8/2/2024)         ALLERGIES     Allergies   Allergen Reactions    Augmented Betamethasone Diprop [Betamethasone] Other (See Comments)     Severe yeast infection    Petrolatum Anaphylaxis and Swelling     Rash and swelling    Shellfish-Derived Products Anaphylaxis     Tongue swelling    Aspirin Swelling     tiongue swelling    Bacitracin      Rash swelling    Bactrim [Sulfamethoxazole-Trimethoprim] Dizziness    Coumadin [Warfarin] Swelling     Leg swelling    Darvon [Propoxyphene] Swelling     Throat closes    Dilaudid [Hydromorphone]      No side effects from fentanyl June 2023    Levaquin [Levofloxacin] Swelling     Tongue swelling    Lidocaine      Facial swelling     Morphine Unknown     Per [facility] 2/23/24    Neomycin Swelling     rash    Neosporin [Neomycin-Polymyxin-Gramicidin] Swelling     rash    Nitrofurantoin      SOB, GI upset,    Oxycodone      Severe itching    Percocet [Oxycodone-Acetaminophen] Unknown    Percodan [Oxycodone-Aspirin]      Severe itching    Polymyxin B     Pramoxine     Tramadol     Vicodin [Hydrocodone-Acetaminophen]      Severe itching       Xarelto [Rivaroxaban]     Adhesive Tape Rash     Band aids     Codeine Rash    Hydrocortisone Rash and Swelling    Other Environmental Allergy Rash     Adhesive tape   Band aids        PAST MEDICAL HISTORY:  Past Medical History:   Diagnosis Date    Acute encephalopathy 09/21/2023    Anemia     Iron Deficiency anemia    Atrial fibrillation (H)     CAD (coronary artery disease)     non-obstructive    Chronic pain     neck, low back, legs    Congestive heart failure (H)     Degenerative disk disease     Diabetes (H)     Fibromyalgia     Gastro-oesophageal reflux disease     Gout     Hiatal hernia     Mumps     Neuropathy     CRISTIANA (obstructive sleep apnea) - CPAP     Palpitations     Pernicious anemia     Sleep apnea     uses CPAP.    Urinary incontinence     Vitamin D deficiency        PAST SURGICAL HISTORY:  Past Surgical History:   Procedure Laterality Date    APPENDECTOMY      CHOLECYSTECTOMY      COLONOSCOPY  3/15/2011    COLONOSCOPY N/A 3/15/2023    Procedure: COLONOSCOPY, WITH POLYPECTOMY AND BIOPSY;  Surgeon: Maci Coronel MD;  Location:  GI    COLONOSCOPY N/A 3/15/2023    Procedure: COLONOSCOPY, FLEXIBLE, WITH LESION REMOVAL USING SNARE;  Surgeon: Maci Coronel MD;  Location:  GI    CORONARY ANGIOGRAPHY ADULT ORDER      CYSTOSCOPY, BIOPSY BLADDER, COMBINED N/A 7/13/2020    Procedure: CYSTOSCOPY, WITH BLADDER BIOPSY;  Surgeon: Deisy Wilson MD;  Location: [facility]    EP PACEMAKER DEVICE & LEAD IMPLANT- RIGHT ATRIAL & RIGHT VENTRICULAR Left 4/5/2024    Procedure: Pacemaker Device & Lead Implant - Right Atrial & Right Ventricular;  Surgeon: Raul Plunkett MD;  Location:  HEART CARDIAC CATH LAB    HEART CATH LEFT HEART CATH  12/30/16    medication management    HYSTERECTOMY TOTAL ABDOMINAL      Knee replacement NOS Left     LAPAROSCOPIC NISSEN FUNDOPLICATION N/A 2/4/2015    Procedure: LAPAROSCOPIC NISSEN FUNDOPLICATION;  Surgeon: Armando Ansari MD;  Location:  OR    TONSILLECTOMY       TRANSPOSITION ULNAR NERVE (ELBOW)         FAMILY HISTORY:  Family History   Problem Relation Age of Onset    Alzheimer Disease Mother     Lung Cancer Father     No Known Problems Brother     No Known Problems Brother     No Known Problems Brother     Unknown/Adopted No family hx of        SOCIAL HISTORY:  Social History     Socioeconomic History    Marital status:      Spouse name: None    Number of children: None    Years of education: None    Highest education level: None   Tobacco Use    Smoking status: Former     Current packs/day: 0.00     Average packs/day: 0.5 packs/day for 50.0 years (25.0 ttl pk-yrs)     Types: Cigarettes     Start date: 1964     Quit date: 2014     Years since quittin.9     Passive exposure: Past    Smokeless tobacco: Never   Vaping Use    Vaping status: Never Used   Substance and Sexual Activity    Alcohol use: Not Currently    Drug use: Never    Sexual activity: Not Currently     Social Determinants of Health     Financial Resource Strain: Low Risk  (2024)    Financial Resource Strain     Within the past 12 months, have you or your family members you live with been unable to get utilities (heat, electricity) when it was really needed?: No   Food Insecurity: Low Risk  (2024)    Food Insecurity     Within the past 12 months, did you worry that your food would run out before you got money to buy more?: No     Within the past 12 months, did the food you bought just not last and you didn t have money to get more?: No   Transportation Needs: High Risk (2024)    Transportation Needs     Within the past 12 months, has lack of transportation kept you from medical appointments, getting your medicines, non-medical meetings or appointments, work, or from getting things that you need?: Yes   Stress: Stress Concern Present (2022)    [employer] [place] [employer] - Occupational Stress Questionnaire     Feeling of Stress : Very much    Received from  "[facility], [facility]    Social Connections   Interpersonal Safety: Low Risk  (4/24/2024)    Interpersonal Safety     Do you feel physically and emotionally safe where you currently live?: Yes     Within the past 12 months, have you been hit, slapped, kicked or otherwise physically hurt by someone?: No     Within the past 12 months, have you been humiliated or emotionally abused in other ways by your partner or ex-partner?: No   Housing Stability: Low Risk  (2/1/2024)    Housing Stability     Do you have housing? : Yes     Are you worried about losing your housing?: No       Review of Systems:  Skin:  not assessed     Eyes:  not assessed    ENT:  not assessed    Respiratory:  Positive for cough;sleep apnea  Cardiovascular:    Positive for;edema  Gastroenterology: not assessed    Genitourinary:  not assessed    Musculoskeletal:  not assessed    Neurologic:  not assessed    Psychiatric:  not assessed    Heme/Lymph/Imm:  not assessed    Endocrine:  not assessed      Physical Exam:  Vitals: /65 (BP Location: Right arm, Patient Position: Sitting, Cuff Size: Adult Regular)   Pulse 69   Ht 1.727 m (5' 8\")   Wt 109.2 kg (240 lb 12.8 oz)   LMP  (LMP Unknown)   BMI 36.61 kg/m      Constitutional:  cooperative;in no acute distress morbidly obese      Skin:  warm and dry to the touch          Head:  normocephalic        Eyes:           Lymph:      ENT:  no pallor or cyanosis        Neck:  no carotid bruit   JVP not well visualized    Respiratory:  clear to auscultation;normal respiratory excursion    crackles right base and ?decr BS left base    Cardiac:   irregularly irregular rhythm distant heart sounds no presence of murmur          not assessed this visit                                        GI:  abdomen soft;non-tender obese      Extremities and Muscular Skeletal:  no deformities, clubbing, cyanosis, erythema observed   bilateral LE edema;1+  "         Neurological:  affect appropriate;no gross motor deficits        Psych:  Alert and Oriented x 3        Recent Lab Results:  LIPID RESULTS:  Lab Results   Component Value Date    CHOL 136 10/19/2023    CHOL 169 02/22/2021    HDL 42 (L) 10/19/2023    HDL 40 (L) 02/22/2021    LDL 78 10/19/2023     (H) 02/22/2021    TRIG 79 10/19/2023    TRIG 110 02/22/2021       LIVER ENZYME RESULTS:  Lab Results   Component Value Date    AST 42 06/28/2024    AST 32 02/22/2021    ALT 24 06/28/2024    ALT 38 02/22/2021       CBC RESULTS:  Lab Results   Component Value Date    WBC 5.2 07/05/2024    WBC 6.1 02/22/2021    RBC 4.32 07/05/2024    RBC 4.89 02/22/2021    HGB 13.5 07/05/2024    HGB 14.7 02/22/2021    HCT 39.6 07/05/2024    HCT 44.5 02/22/2021    MCV 92 07/05/2024    MCV 91 02/22/2021    MCH 31.3 07/05/2024    MCH 30.1 02/22/2021    MCHC 34.1 07/05/2024    MCHC 33.0 02/22/2021    RDW 13.7 07/05/2024    RDW 14.2 02/22/2021     (L) 07/05/2024     02/22/2021       BMP RESULTS:  Lab Results   Component Value Date     07/05/2024     02/22/2021    POTASSIUM 4.4 07/05/2024    POTASSIUM 4.6 11/07/2022    POTASSIUM 4.2 02/22/2021    CHLORIDE 96 (L) 07/05/2024    CHLORIDE 105 11/07/2022    CHLORIDE 103 02/22/2021    CO2 32 (H) 07/05/2024    CO2 29 11/07/2022    CO2 29 02/22/2021    ANIONGAP 11 07/05/2024    ANIONGAP 6 11/07/2022    ANIONGAP 6 02/22/2021     (H) 07/05/2024     (H) 04/08/2024     (H) 11/07/2022     (H) 02/22/2021    BUN 23.6 (H) 07/05/2024    BUN 17 11/07/2022    BUN 14 02/22/2021    CR 1.29 (H) 07/05/2024    CR 0.97 02/22/2021    GFRESTIMATED 41 (L) 07/05/2024    GFRESTIMATED 51 (L) 03/13/2022    GFRESTIMATED 56 (L) 02/22/2021    GFRESTBLACK 64 02/22/2021    SAUL 9.3 07/05/2024    SAUL 9.9 02/22/2021        A1C RESULTS:  Lab Results   Component Value Date    A1C 6.3 (H) 07/05/2024    A1C 6.3 (H) 02/22/2021       INR RESULTS:  Lab Results   Component Value  Date    INR 2.6 07/30/2024    INR 1.7 (A) 07/23/2024    INR 2.1 07/21/2021    INR 2.3 (A) 07/06/2021           CC  Aniyah Waller DO  [id number]  [place] [id number]

## 2024-08-06 ENCOUNTER — ANTICOAGULATION THERAPY VISIT (OUTPATIENT)
Dept: ANTICOAGULATION | Facility: CLINIC | Age: 84
End: 2024-08-06
Payer: COMMERCIAL

## 2024-08-06 DIAGNOSIS — I48.20 CHRONIC ATRIAL FIBRILLATION (H): ICD-10-CM

## 2024-08-06 DIAGNOSIS — I48.21 PERMANENT ATRIAL FIBRILLATION (H): Primary | ICD-10-CM

## 2024-08-06 LAB — INR (EXTERNAL): 2.3 (ref 0.9–1.1)

## 2024-08-06 NOTE — PROGRESS NOTES
ANTICOAGULATION MANAGEMENT     Savanna Rehman 83 year old female is on warfarin with therapeutic INR result. (Goal INR 2.0-3.0)    Recent labs: (last 7 days)     08/06/24  1221   INR 2.3*       ASSESSMENT     Source(s): Chart Review and Home Care/Facility Nurse     Warfarin doses taken: Warfarin taken as instructed  Diet: No new diet changes identified  Medication/supplement changes: None noted  New illness, injury, or hospitalization: Yes: EGD scheduled on 8/12/24  Signs or symptoms of bleeding or clotting: No  Previous result: Therapeutic last visit; previously outside of goal range  Additional findings: None       PLAN     Recommended plan for no diet, medication or health factor changes affecting INR     Dosing Instructions:  continue maintenance dose through 8/7/24, then hold warfarin 8/8/24-8/11/24, take a booster dose on 8/12/24, then resume maintenance dose  with next INR in 2 weeks       Summary  As of 8/6/2024      Full warfarin instructions:  8/8: Hold; 8/9: Hold; 8/10: Hold; 8/11: Hold; 8/12: 7.5 mg; Otherwise 3.75 mg every Mon; 5 mg all other days   Next INR check:  8/19/2024               Telephone call with [facility] home care nurse who verbalizes understanding and agrees to plan    Orders given to  Homecare nurse/facility to recheck    Education provided: Please call back if any changes to your diet, medications or how you've been taking warfarin  Symptom monitoring: monitoring for bleeding signs and symptoms, monitoring for clotting signs and symptoms, and monitoring for stroke signs and symptoms  Contact [phone] with any changes, questions or concerns.     Plan made per [facility] anticoagulation protocol    Dorothy River, RN  Anticoagulation Clinic  8/6/2024    _______________________________________________________________________     Anticoagulation Episode Summary       Current INR goal:  2.0-3.0   TTR:  53.1% (11.3 mo)   Target end date:  Indefinite   Send INR reminders to:  NIKKI CHASE     Indications    Permanent atrial fibrillation (H) [I48.21]  Chronic atrial fibrillation (H) [I48.20]  Long term (current) use of anticoagulants (Resolved) [Z79.01]             Comments:  Home care              Anticoagulation Care Providers       Provider Role Specialty Phone number    Patti Suárez MD Referring Internal Medicine [phone]

## 2024-08-07 ENCOUNTER — TELEPHONE (OUTPATIENT)
Dept: CARDIOLOGY | Facility: CLINIC | Age: 84
End: 2024-08-07
Payer: COMMERCIAL

## 2024-08-07 NOTE — TELEPHONE ENCOUNTER
Returned call to patient and advised that last prescribed dose of Furosemide is 40 mg daily.  Patient verbalized understanding.  Veronica Dominguez RN on 8/7/2024 at 11:14 AM

## 2024-08-07 NOTE — TELEPHONE ENCOUNTER
[facility] Call Center    Phone Message    May a detailed message be left on voicemail: yes     Reason for Call: Medication Question or concern regarding medication   Prescription Clarification  Name of Medication: Lasix  Prescribing Provider: Dr Payan  Pharmacy:    What on the order needs clarification? The patient wants to verify the dose because she said she has 20 mg tablets and her aide has only been placing one a day in her pill box.       Action Taken: Other: Cardiology    Travel Screening: Not Applicable    Thank you!  [facility]       Date of Service:

## 2024-08-09 ENCOUNTER — TELEPHONE (OUTPATIENT)
Dept: CARDIOLOGY | Facility: CLINIC | Age: 84
End: 2024-08-09
Payer: COMMERCIAL

## 2024-08-09 NOTE — TELEPHONE ENCOUNTER
"Called pt back.   Asked her to send a transmission over. Pt was just sitting down to eat breakfast. She will call back if unable to \"figure it out\". Given device phone number.  We talked about the \"jolt\" she received and how this can be nerve related.   Discussed that sometimes these devices can shift a little in the pocket and this may have caused the nerve \"jolt\" she felt.  Pt is quite sure it was electrical in nature.   Will await transmission to assess leads and events.  Jude HORTON  "

## 2024-08-09 NOTE — TELEPHONE ENCOUNTER
[facility] Call Center    Phone Message    May a detailed message be left on voicemail: no     Reason for Call: Other: Pt stated the pacemaker gave her a brief jolt and she lightly pressed on it because she stated it hurt.  Pt believes her pacemaker seems to have slipped and is no longer in the position it was originally.       Pt isn't sure if clinic wants to do a device check or what next steps should be.  She has a procedure on Monday and wants to be sure all is well.     Action Taken: Other: cardio    Travel Screening: Not Applicable     Date of Service:

## 2024-08-09 NOTE — TELEPHONE ENCOUNTER
Transmission received:    Courtesy [employer]egan (S[employer] PPM Remote Check  Presenting Rhythm:  in the 60s  Battery Status: 14.2 years  Leads: stable    Mode: VVIR      : 83.3%    Episodes: 2 HVR episodes likely brief AF w/ RVR  Heart Rate: stable, some variability    Stable device check. Patient is aware.  JENNIFER HORTON

## 2024-08-12 ENCOUNTER — ANESTHESIA EVENT (OUTPATIENT)
Dept: SURGERY | Facility: CLINIC | Age: 84
End: 2024-08-12
Payer: COMMERCIAL

## 2024-08-12 ENCOUNTER — HOSPITAL ENCOUNTER (OUTPATIENT)
Facility: CLINIC | Age: 84
Discharge: HOME | End: 2024-08-12
Attending: INTERNAL MEDICINE | Admitting: INTERNAL MEDICINE
Payer: COMMERCIAL

## 2024-08-12 ENCOUNTER — DOCUMENTATION ONLY (OUTPATIENT)
Dept: ANTICOAGULATION | Facility: CLINIC | Age: 84
End: 2024-08-12

## 2024-08-12 ENCOUNTER — ANESTHESIA (OUTPATIENT)
Dept: SURGERY | Facility: CLINIC | Age: 84
End: 2024-08-12
Payer: COMMERCIAL

## 2024-08-12 VITALS
WEIGHT: 245.9 LBS | TEMPERATURE: 97 F | BODY MASS INDEX: 37.39 KG/M2 | SYSTOLIC BLOOD PRESSURE: 146 MMHG | DIASTOLIC BLOOD PRESSURE: 81 MMHG | OXYGEN SATURATION: 95 % | RESPIRATION RATE: 20 BRPM | HEART RATE: 61 BPM

## 2024-08-12 DIAGNOSIS — K21.9 GASTRO-ESOPHAGEAL REFLUX DISEASE WITHOUT ESOPHAGITIS: ICD-10-CM

## 2024-08-12 LAB
GLUCOSE BLDC GLUCOMTR-MCNC: 121 MG/DL (ref 70–99)
GLUCOSE BLDC GLUCOMTR-MCNC: 125 MG/DL (ref 70–99)
INR PPP: 1.38 (ref 0.85–1.15)
UPPER GI ENDOSCOPY: NORMAL

## 2024-08-12 PROCEDURE — 272N000001 HC OR GENERAL SUPPLY STERILE: Performed by: INTERNAL MEDICINE

## 2024-08-12 PROCEDURE — 36415 COLL VENOUS BLD VENIPUNCTURE: CPT | Performed by: ANESTHESIOLOGY

## 2024-08-12 PROCEDURE — 710N000009 HC RECOVERY PHASE 1, LEVEL 1, PER MIN: Performed by: INTERNAL MEDICINE

## 2024-08-12 PROCEDURE — 999N000141 HC STATISTIC PRE-PROCEDURE NURSING ASSESSMENT: Performed by: INTERNAL MEDICINE

## 2024-08-12 PROCEDURE — 710N000012 HC RECOVERY PHASE 2, PER MINUTE: Performed by: INTERNAL MEDICINE

## 2024-08-12 PROCEDURE — 85610 PROTHROMBIN TIME: CPT | Performed by: ANESTHESIOLOGY

## 2024-08-12 PROCEDURE — 250N000025 HC SEVOFLURANE, PER MIN: Performed by: INTERNAL MEDICINE

## 2024-08-12 PROCEDURE — 250N000011 HC RX IP 250 OP 636: Performed by: NURSE ANESTHETIST, CERTIFIED REGISTERED

## 2024-08-12 PROCEDURE — 82962 GLUCOSE BLOOD TEST: CPT

## 2024-08-12 PROCEDURE — 370N000017 HC ANESTHESIA TECHNICAL FEE, PER MIN: Performed by: INTERNAL MEDICINE

## 2024-08-12 PROCEDURE — 360N000075 HC SURGERY LEVEL 2, PER MIN: Performed by: INTERNAL MEDICINE

## 2024-08-12 PROCEDURE — 258N000003 HC RX IP 258 OP 636: Performed by: ANESTHESIOLOGY

## 2024-08-12 RX ORDER — NALOXONE HYDROCHLORIDE 0.4 MG/ML
0.1 INJECTION, SOLUTION INTRAMUSCULAR; INTRAVENOUS; SUBCUTANEOUS
Status: DISCONTINUED | OUTPATIENT
Start: 2024-08-12 | End: 2024-08-12 | Stop reason: HOSPADM

## 2024-08-12 RX ORDER — ONDANSETRON 2 MG/ML
4 INJECTION INTRAMUSCULAR; INTRAVENOUS EVERY 6 HOURS PRN
Status: DISCONTINUED | OUTPATIENT
Start: 2024-08-12 | End: 2024-08-12 | Stop reason: HOSPADM

## 2024-08-12 RX ORDER — ONDANSETRON 4 MG/1
4 TABLET, ORALLY DISINTEGRATING ORAL EVERY 30 MIN PRN
Status: DISCONTINUED | OUTPATIENT
Start: 2024-08-12 | End: 2024-08-12 | Stop reason: HOSPADM

## 2024-08-12 RX ORDER — ONDANSETRON 2 MG/ML
4 INJECTION INTRAMUSCULAR; INTRAVENOUS EVERY 30 MIN PRN
Status: DISCONTINUED | OUTPATIENT
Start: 2024-08-12 | End: 2024-08-12 | Stop reason: HOSPADM

## 2024-08-12 RX ORDER — ONDANSETRON 4 MG/1
4 TABLET, ORALLY DISINTEGRATING ORAL EVERY 6 HOURS PRN
Status: DISCONTINUED | OUTPATIENT
Start: 2024-08-12 | End: 2024-08-12 | Stop reason: HOSPADM

## 2024-08-12 RX ORDER — PROPOFOL 10 MG/ML
INJECTION, EMULSION INTRAVENOUS PRN
Status: DISCONTINUED | OUTPATIENT
Start: 2024-08-12 | End: 2024-08-12

## 2024-08-12 RX ORDER — PROCHLORPERAZINE MALEATE 5 MG
5 TABLET ORAL EVERY 6 HOURS PRN
Status: DISCONTINUED | OUTPATIENT
Start: 2024-08-12 | End: 2024-08-12 | Stop reason: HOSPADM

## 2024-08-12 RX ORDER — NALOXONE HYDROCHLORIDE 0.4 MG/ML
0.4 INJECTION, SOLUTION INTRAMUSCULAR; INTRAVENOUS; SUBCUTANEOUS
Status: DISCONTINUED | OUTPATIENT
Start: 2024-08-12 | End: 2024-08-12 | Stop reason: HOSPADM

## 2024-08-12 RX ORDER — ONDANSETRON 2 MG/ML
4 INJECTION INTRAMUSCULAR; INTRAVENOUS
Status: DISCONTINUED | OUTPATIENT
Start: 2024-08-12 | End: 2024-08-12 | Stop reason: HOSPADM

## 2024-08-12 RX ORDER — FLUMAZENIL 0.1 MG/ML
0.2 INJECTION, SOLUTION INTRAVENOUS
Status: DISCONTINUED | OUTPATIENT
Start: 2024-08-12 | End: 2024-08-12 | Stop reason: HOSPADM

## 2024-08-12 RX ORDER — FENTANYL CITRATE 50 UG/ML
INJECTION, SOLUTION INTRAMUSCULAR; INTRAVENOUS PRN
Status: DISCONTINUED | OUTPATIENT
Start: 2024-08-12 | End: 2024-08-12

## 2024-08-12 RX ORDER — SODIUM CHLORIDE, SODIUM LACTATE, POTASSIUM CHLORIDE, CALCIUM CHLORIDE 600; 310; 30; 20 MG/100ML; MG/100ML; MG/100ML; MG/100ML
INJECTION, SOLUTION INTRAVENOUS CONTINUOUS
Status: DISCONTINUED | OUTPATIENT
Start: 2024-08-12 | End: 2024-08-12 | Stop reason: HOSPADM

## 2024-08-12 RX ORDER — NALOXONE HYDROCHLORIDE 0.4 MG/ML
0.2 INJECTION, SOLUTION INTRAMUSCULAR; INTRAVENOUS; SUBCUTANEOUS
Status: DISCONTINUED | OUTPATIENT
Start: 2024-08-12 | End: 2024-08-12 | Stop reason: HOSPADM

## 2024-08-12 RX ORDER — FENTANYL CITRATE 50 UG/ML
25 INJECTION, SOLUTION INTRAMUSCULAR; INTRAVENOUS
Status: DISCONTINUED | OUTPATIENT
Start: 2024-08-12 | End: 2024-08-12 | Stop reason: HOSPADM

## 2024-08-12 RX ORDER — DEXAMETHASONE SODIUM PHOSPHATE 4 MG/ML
INJECTION, SOLUTION INTRA-ARTICULAR; INTRALESIONAL; INTRAMUSCULAR; INTRAVENOUS; SOFT TISSUE PRN
Status: DISCONTINUED | OUTPATIENT
Start: 2024-08-12 | End: 2024-08-12

## 2024-08-12 RX ADMIN — DEXAMETHASONE SODIUM PHOSPHATE 4 MG: 4 INJECTION, SOLUTION INTRA-ARTICULAR; INTRALESIONAL; INTRAMUSCULAR; INTRAVENOUS; SOFT TISSUE at 09:15

## 2024-08-12 RX ADMIN — SODIUM CHLORIDE, POTASSIUM CHLORIDE, SODIUM LACTATE AND CALCIUM CHLORIDE: 600; 310; 30; 20 INJECTION, SOLUTION INTRAVENOUS at 07:33

## 2024-08-12 RX ADMIN — Medication 100 MG: at 09:08

## 2024-08-12 RX ADMIN — FENTANYL CITRATE 100 MCG: 50 INJECTION INTRAMUSCULAR; INTRAVENOUS at 09:07

## 2024-08-12 RX ADMIN — PROPOFOL 150 MG: 10 INJECTION, EMULSION INTRAVENOUS at 09:07

## 2024-08-12 ASSESSMENT — ACTIVITIES OF DAILY LIVING (ADL)
ADLS_ACUITY_SCORE: 36
ADLS_ACUITY_SCORE: 37
ADLS_ACUITY_SCORE: 36
ADLS_ACUITY_SCORE: 37

## 2024-08-12 NOTE — DISCHARGE INSTRUCTIONS
ESOPHAGOGASTRODUODENOSCOPY DISCHARGE INSTRUCTIONS    You may not drive, use heavy equipment or consume alcohol for 24 hours because the drugs you were given may cause dizziness, drowsiness, forgetfulness and slower reaction time.    Small pieces or tissue (biopsies) or polyps may have been removed.    You may resume your regular diet and medications. Exception: If you had a biopsy or polypectomy, do not take aspirin, aleve (naproxen) or ibuprofen for the next 10 days.  Tylenol (acetaminophen) is safe to take.    Additional instructions:  If you had a biopsy or polypectomy, the pathology report will be sent to your doctor.  If you have not received the results within 10 days, call your doctor's office.    What to watch for:  Problems rarely occur after the procedure.  It is important for you to be aware of the early signs of a possible complication.  Call immediately if you notice any of the followin.  Unusual pain or difficulty swallowing.    2.  Unusual abdominal or chest pain.    3.  Vomiting of blood.    4.  Black or bloody stools.    5.  Temperature above 100.6 degrees F        Restart Coumadin today , per Dr Maynor FELICIANO M.D.   CLINIC PHONE NUMBER:  [phone]

## 2024-08-12 NOTE — ANESTHESIA CARE TRANSFER NOTE
Patient: Savanna Rehman    Procedure: Procedure(s):  ESOPHAGOGASTRODUODENOSCOPY       Diagnosis: Left lower quadrant pain [R10.32]  Diagnosis Additional Information: No value filed.    Anesthesia Type:   General     Note:    Oropharynx: oropharynx clear of all foreign objects and spontaneously breathing  Level of Consciousness: drowsy  Oxygen Supplementation: face mask  Level of Supplemental Oxygen (L/min / FiO2): 8  Independent Airway: airway patency satisfactory and stable  Dentition: dentition unchanged  Vital Signs Stable: post-procedure vital signs reviewed and stable  Report to RN Given: handoff report given  Patient transferred to: PACU    Handoff Report: Identifed the Patient, Identified the Reponsible Provider, Reviewed the pertinent medical history, Discussed the surgical course, Reviewed Intra-OP anesthesia mangement and issues during anesthesia, Set expectations for post-procedure period and Allowed opportunity for questions and acknowledgement of understanding  Vitals:  Vitals Value Taken Time   BP     Temp     Pulse 65 08/12/24 0929   Resp 29 08/12/24 0929   SpO2 100 % 08/12/24 0929   Vitals shown include unfiled device data.    Electronically Signed By: BATSHEVA Gutiérrez CRNA  August 12, 2024  9:30 AM

## 2024-08-12 NOTE — PROGRESS NOTES
ANTICOAGULATION  MANAGEMENT: Discharge Review    Savanna Rehman chart reviewed for anticoagulation continuity of care    Outpatient surgery/procedure on 8/12/24 for EGD.    Discharge disposition: Home    Results:    Recent labs: (last 7 days)     08/06/24  1221 08/12/24  0814   INR 2.3* 1.38*     Anticoagulation inpatient management:     not applicable     Anticoagulation discharge instructions:     Warfarin dosing:  Per procedure plan on 7/15/24 - resume warfarin dose if ok with provider doing procedure, on night of procedure [place] of 7.5mg    Bridging: No   INR goal change: No      Medication changes affecting anticoagulation: Yes: omeprazole daily     Additional factors affecting anticoagulation: No     PLAN     No adjustment to anticoagulation plan needed    Patient not contacted    No adjustment to Anticoagulation Calendar was required    Dawood Martinez RN

## 2024-08-12 NOTE — ANESTHESIA PREPROCEDURE EVALUATION
Anesthesia Pre-Procedure Evaluation    Patient: Savanna Rehman   MRN: [id number] : 1940        Procedure : Procedure(s):  ESOPHAGOGASTRODUODENOSCOPY          Past Medical History:   Diagnosis Date    Acute encephalopathy 2023    Anemia     Iron Deficiency anemia    Atrial fibrillation (H)     CAD (coronary artery disease)     non-obstructive    Chronic pain     neck, low back, legs    Congestive heart failure (H)     Degenerative disk disease     Diabetes (H)     Fibromyalgia     Gastro-oesophageal reflux disease     Gout     Hiatal hernia     Mumps     Neuropathy     CRISTIANA (obstructive sleep apnea) - CPAP     Palpitations     Pernicious anemia     Sleep apnea     uses CPAP.    Urinary incontinence     Vitamin D deficiency       Past Surgical History:   Procedure Laterality Date    APPENDECTOMY      CHOLECYSTECTOMY      COLONOSCOPY  3/15/2011    COLONOSCOPY N/A 3/15/2023    Procedure: COLONOSCOPY, WITH POLYPECTOMY AND BIOPSY;  Surgeon: Maci Coronel MD;  Location:  GI    COLONOSCOPY N/A 3/15/2023    Procedure: COLONOSCOPY, FLEXIBLE, WITH LESION REMOVAL USING SNARE;  Surgeon: Maci Coronel MD;  Location:  GI    CORONARY ANGIOGRAPHY ADULT ORDER      CYSTOSCOPY, BIOPSY BLADDER, COMBINED N/A 2020    Procedure: CYSTOSCOPY, WITH BLADDER BIOPSY;  Surgeon: Deisy Wilson MD;  Location: [facility]    EP PACEMAKER DEVICE & LEAD IMPLANT- RIGHT ATRIAL & RIGHT VENTRICULAR Left 2024    Procedure: Pacemaker Device & Lead Implant - Right Atrial & Right Ventricular;  Surgeon: Raul Plunkett MD;  Location:  [facility] CARDI[facility] CATH LAB    HEART CATH LEFT HEART CATH  16    medication management    HYSTERECTOMY TOTAL ABDOMINAL      Knee replacement NOS Left     LAPAROSCOPIC NISSEN FUNDOPLICATION N/A 2015    Procedure: LAPAROSCOPIC NISSEN FUNDOPLICATION;  Surgeon: Armando Ansari MD;  Location:  OR    TONSILLECTOMY      TRANSPOSITION ULNAR NERVE (ELBOW)        Allergies   Allergen  Reactions    Augmented Betamethasone Diprop [Betamethasone] Other (See Comments)     Severe yeast infection    Petrolatum Anaphylaxis and Swelling     Rash and swelling    Shellfish-Derived Products Anaphylaxis     Tongue swelling    Aspirin Swelling     tongue swelling    Bacitracin      Rash swelling    Bactrim [Sulfamethoxazole-Trimethoprim] Dizziness    Darvon [Propoxyphene] Swelling     Throat closes    Dilaudid [Hydromorphone]      No side effects from fentanyl 2023    Levaquin [Levofloxacin] Swelling     Tongue swelling    Lidocaine      Facial swelling     Morphine Unknown     Per [facility]    Neomycin Swelling     rash    Neosporin [Neomycin-Polymyxin-Gramicidin] Swelling     rash    Nitrofurantoin      SOB, GI upset,    Oxycodone      Severe itching    Percocet [Oxycodone-Acetaminophen] Unknown    Percodan [Oxycodone-Aspirin]      Severe itching    Polymyxin B     Pramoxine     Tramadol     Vicodin [Hydrocodone-Acetaminophen]      Severe itching      Xarelto [Rivaroxaban]     Adhesive Tape Rash     Band aids     Codeine Rash    Hydrocortisone Rash and Swelling    Other Environmental Allergy Rash     Adhesive tape   Band aids       Social History     Tobacco Use    Smoking status: Former     Current packs/day: 0.00     Average packs/day: 0.5 packs/day for 50.0 years (25.0 ttl pk-yrs)     Types: Cigarettes     Start date: 1964     Quit date: 2014     Years since quittin.9     Passive exposure: Past    Smokeless tobacco: Never   Substance Use Topics    Alcohol use: Not Currently      Wt Readings from Last 1 Encounters:   24 111.5 kg (245 lb 14.4 oz)        Anesthesia Evaluation   Pt has had prior anesthetic. Type: General and MAC.    History of anesthetic complications  - PONV.      ROS/MED HX  ENT/Pulmonary:     (+) sleep apnea, mild, doesn't use CPAP,                                      Neurologic:  - neg neurologic ROS     Cardiovascular:     (+)  - -  CAD -  - -       CHF                                METS/Exercise Tolerance:     Hematologic: Comments: Lab Test        08/06/24     07/30/24     07/23/24     07/16/24     07/05/24     07/02/24     06/28/24     04/23/24     04/15/24                       1221          1232          1218          1206          1527          1251          1020          0000          1040          WBC           --           --           --           --          5.2           --          4.4           --          4.1           HGB           --           --           --           --          13.5          --          11.9          --          11.6*         MCV           --           --           --           --          92            --          94            --          93            PLT           --           --           --           --          131*          --          120*          --          116*          INR          2.3*         2.6          1.7*           < >         --            < >        1.40*          < >         --            < > = values in this interval not displayed.                  Lab Test        08/12/24 07/05/24 06/28/24 06/07/24                       0630          1527          1020          1123          NA            --          139          138          139           POTASSIUM     --          4.4          3.9          4.4           CHLORIDE      --          96*          98           100           CO2           --          32*          30*          27            BUN           --          23.6*        16.9         17.4          CR            --          1.29*        1.17*        1.11*         ANIONGAP      --          11           10           12            SAUL           --          9.3          9.0          9.5           GLC          125*         132*         107*         128*                Musculoskeletal:       GI/Hepatic:     (+) GERD, Asymptomatic on medication,    hiatal hernia,       liver disease,        Renal/Genitourinary:     (+) renal disease,             Endo:     (+)  type II DM,             Obesity,       Psychiatric/Substance Use:  - neg psychiatric ROS     Infectious Disease:  - neg infectious disease ROS     Malignancy:  - neg malignancy ROS     Other:  - neg other ROS    (+)  , H/O Chronic Pain,         Physical Exam    Airway        Mallampati: II   TM distance: > 3 FB   Neck ROM: full   Mouth opening: > 3 cm    Respiratory Devices and Support         Dental       (+) Minor Abnormalities - some fillings, tiny chips      Cardiovascular   cardiovascular exam normal          Pulmonary   pulmonary exam normal                OUTSIDE LABS:  CBC:   Lab Results   Component Value Date    WBC 5.2 07/05/2024    WBC 4.4 06/28/2024    HGB 13.5 07/05/2024    HGB 11.9 06/28/2024    HCT 39.6 07/05/2024    HCT 36.5 06/28/2024     (L) 07/05/2024     (L) 06/28/2024     BMP:   Lab Results   Component Value Date     07/05/2024     06/28/2024    POTASSIUM 4.4 07/05/2024    POTASSIUM 3.9 06/28/2024    CHLORIDE 96 (L) 07/05/2024    CHLORIDE 98 06/28/2024    CO2 32 (H) 07/05/2024    CO2 30 (H) 06/28/2024    BUN 23.6 (H) 07/05/2024    BUN 16.9 06/28/2024    CR 1.29 (H) 07/05/2024    CR 1.17 (H) 06/28/2024     (H) 08/12/2024     (H) 07/05/2024     COAGS:   Lab Results   Component Value Date    PTT 34 06/28/2024    INR 2.3 (A) 08/06/2024     POC:   Lab Results   Component Value Date     (H) 07/13/2020     HEPATIC:   Lab Results   Component Value Date    ALBUMIN 3.9 06/28/2024    PROTTOTAL 7.6 06/28/2024    ALT 24 06/28/2024    AST 42 06/28/2024    ALKPHOS 157 (H) 06/28/2024    BILITOTAL 0.6 06/28/2024     OTHER:   Lab Results   Component Value Date    LACT 1.7 01/27/2020    A1C 6.3 (H) 07/05/2024    SAUL 9.3 07/05/2024    PHOS 2.8 04/04/2024    MAG 1.8 04/04/2024    TSH 2.63 07/05/2024       Anesthesia Plan    ASA Status:  3    NPO Status:  NPO Appropriate    Anesthesia Type:  "General.     - Airway: ETT   Induction: Intravenous, Propofol.           Consents    Anesthesia Plan(s) and associated risks, benefits, and realistic alternatives discussed. Questions answered and patient/representative(s) expressed understanding.     - Discussed:     - Discussed with:  Patient      - Extended Intubation/Ventilatory Support Discussed: No.      - Patient is DNR/DNI Status: No     Use of blood products discussed: No .     Postoperative Care    Pain management: IV analgesics.   PONV prophylaxis: Dexamethasone or Solumedrol, Ondansetron (or other 5HT-3)     Comments:               Dwayne Do MD    I have reviewed the pertinent notes and labs in the chart from the past 30 days and (re)examined the patient.  Any updates or changes from those notes are reflected in this note.            # Drug Induced Coagulation Defect: home medication list includes an anticoagulant medication   # Obesity: Estimated body mass index is 37.39 kg/m  as calculated from the following:    Height as of 8/2/24: 1.727 m (5' 8\").    Weight as of this encounter: 111.5 kg (245 lb 14.4 oz).      "

## 2024-08-12 NOTE — ANESTHESIA PROCEDURE NOTES
Airway       Patient location during procedure: OR       Procedure Start/Stop Times: 8/12/2024 9:10 AM  Staff -        CRNA: Denny Oconnor APRN CRNA       Performed By: CRNA  Consent for Airway        Urgency: elective  Indications and Patient Condition       Indications for airway management: barbara-procedural       Induction type:intravenous       Mask difficulty assessment: 1 - vent by mask    Final Airway Details       Final airway type: endotracheal airway       Successful airway: ETT - single and Oral  Endotracheal Airway Details        ETT size (mm): 7.0       Cuffed: yes       Cuff volume (mL): 5       Successful intubation technique: direct laryngoscopy       DL Blade Type: Franco 2       Grade View of Cords: 1       Adjucts: stylet       Position: Right       Measured from: gums/teeth       Secured at (cm): 21       Bite block used: None    Post intubation assessment        Placement verified by: capnometry, equal breath sounds and chest rise        Number of attempts at approach: 1       Number of other approaches attempted: 0       Secured with: tape       Ease of procedure: easy       Dentition: Intact and Unchanged    Medication(s) Administered   Medication Administration Time: 8/12/2024 9:10 AM

## 2024-08-13 ENCOUNTER — ANTICOAGULATION THERAPY VISIT (OUTPATIENT)
Dept: ANTICOAGULATION | Facility: CLINIC | Age: 84
End: 2024-08-13
Payer: COMMERCIAL

## 2024-08-13 DIAGNOSIS — I48.21 PERMANENT ATRIAL FIBRILLATION (H): Primary | ICD-10-CM

## 2024-08-13 DIAGNOSIS — I48.20 CHRONIC ATRIAL FIBRILLATION (H): ICD-10-CM

## 2024-08-13 NOTE — PROGRESS NOTES
"ANTICOAGULATION MANAGEMENT     Savanna Rehman 83 year old female is on warfarin with subtherapeutic INR result. (Goal INR 2.0-3.0)    Recent labs: (last 7 days)     08/12/24  0814   INR 1.38*       ASSESSMENT     Source(s): Chart Review     Warfarin doses taken: Missed dose(s) may be affecting INR  Diet: No new diet changes identified  Medication/supplement changes: None noted  New illness, injury, or hospitalization: No  Signs or symptoms of bleeding or clotting: No  Previous result: Therapeutic last 2(+) visits  Additional findings:  missed dose last night after procedure - took at 10am today 7.5mg - asking for clarification on dosing for tonight       PLAN     Recommended plan for temporary change(s) affecting INR     Dosing Instructions:  take tonights \"normal\" dose of 5mg and continue MD with recheck of INR in 1 week from today    Summary  As of 8/13/2024      Full warfarin instructions:  8/13: 12.5 mg; Otherwise 3.75 mg every Mon; 5 mg all other days   Next INR check:  8/20/2024               Telephone call with [facility] home care nurse who verbalizes understanding and agrees to plan and who agrees to plan and repeated back plan correctly    Orders given to  Homecare nurse/facility to recheck    Education provided: Contact [phone] with any changes, questions or concerns.     Plan made per [facility] anticoagulation protocol    Sanjana Fox RN  Anticoagulation Clinic  8/13/2024    _______________________________________________________________________     Anticoagulation Episode Summary       Current INR goal:  2.0-3.0   TTR:  52.6% (11.3 mo)   Target end date:  Indefinite   Send INR reminders to:  NIKKI CHASE    Indications    Permanent atrial fibrillation (H) [I48.21]  Chronic atrial fibrillation (H) [I48.20]  Long term (current) use of anticoagulants (Resolved) [Z79.01]             Comments:  Home care              Anticoagulation Care Providers       Provider Role Specialty Phone number    " Patti Suárez MD Referring Internal Medicine [phone]

## 2024-08-16 ENCOUNTER — DOCUMENTATION ONLY (OUTPATIENT)
Dept: CARDIOLOGY | Facility: CLINIC | Age: 84
End: 2024-08-16
Payer: COMMERCIAL

## 2024-08-16 NOTE — PROGRESS NOTES
[facility] received from [facility] on 6/28/24.  Patient admitted for leg swelling/SOB/lightheadedness when bending over and PPM interrogation requested. Lasix increased to 60mg for 5 days and then f/up with PCP.   Presenting rhythm:   Battery: 14.4 years  Lead Measurements: WNLs  Atrial Arrhythmias: n/a   Ventricular Arrhythmias: 12 ventricular high rates logged. EGMs suggestive of RVR, rates 150-200bpm.    Follow-up Care: PPM was checked remotely 8/9/24.    PINA Pierson

## 2024-08-20 ENCOUNTER — ANTICOAGULATION THERAPY VISIT (OUTPATIENT)
Dept: ANTICOAGULATION | Facility: CLINIC | Age: 84
End: 2024-08-20
Payer: COMMERCIAL

## 2024-08-20 DIAGNOSIS — I48.20 CHRONIC ATRIAL FIBRILLATION (H): ICD-10-CM

## 2024-08-20 DIAGNOSIS — I48.21 PERMANENT ATRIAL FIBRILLATION (H): Primary | ICD-10-CM

## 2024-08-20 LAB — INR (EXTERNAL): 1.8

## 2024-08-20 NOTE — PROGRESS NOTES
ANTICOAGULATION MANAGEMENT     Savanna Rehman 83 year old female is on warfarin with subtherapeutic INR result. (Goal INR 2.0-3.0)    Recent labs: (last 7 days)     08/20/24  1311   INR 1.8       ASSESSMENT     Source(s): Chart Review and Home Care/Facility Nurse     Warfarin doses taken: Missed dose(s) may be affecting INR  Diet: No new diet changes identified  Medication/supplement changes:  Omeprazole started last week.   New illness, injury, or hospitalization: Yes: fall last evening: per home care nurse, slide from bed last evening to floor and landed on buttock. No injuries or bruising noted today.   Signs or symptoms of bleeding or clotting: No  Previous result: Subtherapeutic  Additional findings: None       PLAN     Recommended plan for temporary change(s) affecting INR     Dosing Instructions: booster dose then continue your current warfarin dose with next INR in 1 week       Summary  As of 8/20/2024      Full warfarin instructions:  8/20: 7.5 mg; Otherwise 3.75 mg every Mon; 5 mg all other days   Next INR check:  8/27/2024               Telephone call with [facility] home care nurse who agrees to plan and repeated back plan correctly    Orders given to  Homecare nurse/facility to recheck    Education provided: Please call back if any changes to your diet, medications or how you've been taking warfarin    Plan made per [facility] anticoagulation protocol    Azul Whitaker, RN  Anticoagulation Clinic  8/20/2024    _______________________________________________________________________     Anticoagulation Episode Summary       Current INR goal:  2.0-3.0   TTR:  51.9% (11.3 mo)   Target end date:  Indefinite   Send INR reminders to:  NIKKI CHASE    Indications    Permanent atrial fibrillation (H) [I48.21]  Chronic atrial fibrillation (H) [I48.20]  Long term (current) use of anticoagulants (Resolved) [Z79.01]             Comments:  Home care              Anticoagulation Care Providers       Provider Role  Specialty Phone number    Patti Suárez MD Referring Internal Medicine [phone]

## 2024-08-21 ENCOUNTER — TELEPHONE (OUTPATIENT)
Dept: INTERNAL MEDICINE | Facility: CLINIC | Age: 84
End: 2024-08-21
Payer: COMMERCIAL

## 2024-08-22 DIAGNOSIS — Z53.9 DIAGNOSIS NOT YET DEFINED: Primary | ICD-10-CM

## 2024-08-22 PROCEDURE — G0179 MD RECERTIFICATION HHA PT: HCPCS | Performed by: INTERNAL MEDICINE

## 2024-08-27 ENCOUNTER — ANTICOAGULATION THERAPY VISIT (OUTPATIENT)
Dept: ANTICOAGULATION | Facility: CLINIC | Age: 84
End: 2024-08-27
Payer: COMMERCIAL

## 2024-08-27 DIAGNOSIS — I48.20 CHRONIC ATRIAL FIBRILLATION (H): ICD-10-CM

## 2024-08-27 DIAGNOSIS — I48.21 PERMANENT ATRIAL FIBRILLATION (H): Primary | ICD-10-CM

## 2024-08-27 LAB — INR (EXTERNAL): 3.6

## 2024-08-27 NOTE — PROGRESS NOTES
ANTICOAGULATION MANAGEMENT     Savanna Rehman 83 year old female is on warfarin with supratherapeutic INR result. (Goal INR 2.0-3.0)    Recent labs: (last 7 days)     08/27/24  1235   INR 3.6       ASSESSMENT     Source(s): Chart Review and Home Care/Facility Nurse     Warfarin doses taken: Booster dose(s) recently taken which may be affecting INR and Missed dose(s) may be affecting INR  Diet: No new diet changes identified  Medication/supplement changes: None noted  New illness, injury, or hospitalization: No  Signs or symptoms of bleeding or clotting: No  Previous result: Subtherapeutic  Additional findings: None       PLAN     Recommended plan for temporary change(s) affecting INR     Dosing Instructions: hold dose then continue your current warfarin dose with next INR in 1 week       Summary  As of 8/27/2024      Full warfarin instructions:  8/27: Hold; Otherwise 3.75 mg every Mon; 5 mg all other days   Next INR check:  9/3/2024               Telephone call with [facility] home care nurse who agrees to plan and repeated back plan correctly    Orders given to  Homecare nurse/facility to recheck    Education provided: Please call back if any changes to your diet, medications or how you've been taking warfarin    Plan made per [facility] anticoagulation protocol    Azul Whitaker, RN  Anticoagulation Clinic  8/27/2024    _______________________________________________________________________     Anticoagulation Episode Summary       Current INR goal:  2.0-3.0   TTR:  51.0% (11.3 mo)   Target end date:  Indefinite   Send INR reminders to:  [facility]    Indications    Permanent atrial fibrillation (H) [I48.21]  Chronic atrial fibrillation (H) [I48.20]  Long term (current) use of anticoagulants (Resolved) [Z79.01]             Comments:  Home care              Anticoagulation Care Providers       Provider Role Specialty Phone number    Patti Suárez MD Referring Internal Medicine [phone]

## 2024-08-30 ENCOUNTER — MEDICAL CORRESPONDENCE (OUTPATIENT)
Dept: HEALTH INFORMATION MANAGEMENT | Facility: CLINIC | Age: 84
End: 2024-08-30

## 2024-08-30 ENCOUNTER — TELEPHONE (OUTPATIENT)
Dept: ANTICOAGULATION | Facility: CLINIC | Age: 84
End: 2024-08-30

## 2024-08-30 ENCOUNTER — APPOINTMENT (OUTPATIENT)
Dept: CT IMAGING | Facility: CLINIC | Age: 84
DRG: 813 | End: 2024-08-30
Attending: INTERNAL MEDICINE
Payer: COMMERCIAL

## 2024-08-30 ENCOUNTER — HOSPITAL ENCOUNTER (INPATIENT)
Facility: CLINIC | Age: 84
LOS: 8 days | Discharge: NURSING, REHAB OR LONG-TERM CARE | DRG: 813 | End: 2024-09-07
Attending: EMERGENCY MEDICINE | Admitting: INTERNAL MEDICINE
Payer: COMMERCIAL

## 2024-08-30 ENCOUNTER — NURSE TRIAGE (OUTPATIENT)
Dept: INTERNAL MEDICINE | Facility: CLINIC | Age: 84
End: 2024-08-30
Payer: COMMERCIAL

## 2024-08-30 DIAGNOSIS — R19.5 LOOSE STOOLS: ICD-10-CM

## 2024-08-30 DIAGNOSIS — I48.91 ATRIAL FIBRILLATION, UNSPECIFIED TYPE (H): ICD-10-CM

## 2024-08-30 DIAGNOSIS — N18.31 TYPE 2 DIABETES MELLITUS WITH STAGE 3A CHRONIC KIDNEY DISEASE, WITHOUT LONG-TERM CURRENT USE OF INSULIN (H): ICD-10-CM

## 2024-08-30 DIAGNOSIS — G47.33 OSA (OBSTRUCTIVE SLEEP APNEA): ICD-10-CM

## 2024-08-30 DIAGNOSIS — I50.9 CONGESTIVE HEART FAILURE, UNSPECIFIED HF CHRONICITY, UNSPECIFIED HEART FAILURE TYPE (H): ICD-10-CM

## 2024-08-30 DIAGNOSIS — N39.41 URGENCY INCONTINENCE: ICD-10-CM

## 2024-08-30 DIAGNOSIS — E55.9 VITAMIN D DEFICIENCY: ICD-10-CM

## 2024-08-30 DIAGNOSIS — R53.81 PHYSICAL DECONDITIONING: Primary | ICD-10-CM

## 2024-08-30 DIAGNOSIS — Z79.01 ANTICOAGULATED ON COUMADIN: ICD-10-CM

## 2024-08-30 DIAGNOSIS — K21.9 GASTROESOPHAGEAL REFLUX DISEASE WITHOUT ESOPHAGITIS: ICD-10-CM

## 2024-08-30 DIAGNOSIS — G93.40 ACUTE ENCEPHALOPATHY: Chronic | ICD-10-CM

## 2024-08-30 DIAGNOSIS — E87.70 HYPERVOLEMIA, UNSPECIFIED HYPERVOLEMIA TYPE: ICD-10-CM

## 2024-08-30 DIAGNOSIS — K21.9 GASTROESOPHAGEAL REFLUX DISEASE: ICD-10-CM

## 2024-08-30 DIAGNOSIS — E11.22 TYPE 2 DIABETES MELLITUS WITH STAGE 3A CHRONIC KIDNEY DISEASE, WITHOUT LONG-TERM CURRENT USE OF INSULIN (H): ICD-10-CM

## 2024-08-30 DIAGNOSIS — Z79.01 LONG TERM CURRENT USE OF ANTICOAGULANT THERAPY: ICD-10-CM

## 2024-08-30 DIAGNOSIS — D62 ANEMIA DUE TO BLOOD LOSS, ACUTE: ICD-10-CM

## 2024-08-30 DIAGNOSIS — Z95.0 S/P PLACEMENT OF CARDIAC PACEMAKER: ICD-10-CM

## 2024-08-30 DIAGNOSIS — I48.20 CHRONIC ATRIAL FIBRILLATION (H): ICD-10-CM

## 2024-08-30 DIAGNOSIS — R07.9 CHEST PAIN, UNSPECIFIED TYPE: ICD-10-CM

## 2024-08-30 DIAGNOSIS — K57.92 DIVERTICULITIS: ICD-10-CM

## 2024-08-30 DIAGNOSIS — N39.0 RECURRENT UTI: ICD-10-CM

## 2024-08-30 DIAGNOSIS — I48.91 ATRIAL FIBRILLATION WITH SLOW VENTRICULAR RESPONSE (H): ICD-10-CM

## 2024-08-30 DIAGNOSIS — R32 URINARY INCONTINENCE, UNSPECIFIED TYPE: ICD-10-CM

## 2024-08-30 DIAGNOSIS — R79.89 ELEVATED TROPONIN: ICD-10-CM

## 2024-08-30 DIAGNOSIS — R41.89 COGNITIVE IMPAIRMENT: ICD-10-CM

## 2024-08-30 DIAGNOSIS — D69.6 THROMBOCYTOPENIA (H): ICD-10-CM

## 2024-08-30 DIAGNOSIS — I48.21 PERMANENT ATRIAL FIBRILLATION (H): ICD-10-CM

## 2024-08-30 DIAGNOSIS — A04.72 C. DIFFICILE COLITIS: ICD-10-CM

## 2024-08-30 DIAGNOSIS — E78.5 HYPERLIPIDEMIA LDL GOAL <100: ICD-10-CM

## 2024-08-30 DIAGNOSIS — M62.81 MUSCLE WEAKNESS (GENERALIZED): ICD-10-CM

## 2024-08-30 DIAGNOSIS — I77.810 ASCENDING AORTA DILATATION (H): ICD-10-CM

## 2024-08-30 LAB
ABO/RH(D): NORMAL
ALBUMIN SERPL BCG-MCNC: 3.8 G/DL (ref 3.5–5.2)
ALP SERPL-CCNC: 135 U/L (ref 40–150)
ALT SERPL W P-5'-P-CCNC: 34 U/L (ref 0–50)
ANION GAP SERPL CALCULATED.3IONS-SCNC: 12 MMOL/L (ref 7–15)
ANTIBODY SCREEN: NEGATIVE
AST SERPL W P-5'-P-CCNC: 40 U/L (ref 0–45)
BASOPHILS # BLD AUTO: 0 10E3/UL (ref 0–0.2)
BASOPHILS NFR BLD AUTO: 1 %
BILIRUB DIRECT SERPL-MCNC: 0.33 MG/DL (ref 0–0.3)
BILIRUB SERPL-MCNC: 1.2 MG/DL
BLD PROD TYP BPU: NORMAL
BLOOD COMPONENT TYPE: NORMAL
BUN SERPL-MCNC: 18 MG/DL (ref 8–23)
CALCIUM SERPL-MCNC: 9.1 MG/DL (ref 8.8–10.4)
CHLORIDE SERPL-SCNC: 102 MMOL/L (ref 98–107)
CODING SYSTEM: NORMAL
CREAT SERPL-MCNC: 1.04 MG/DL (ref 0.51–0.95)
EGFRCR SERPLBLD CKD-EPI 2021: 54 ML/MIN/1.73M2
EOSINOPHIL # BLD AUTO: 0.2 10E3/UL (ref 0–0.7)
EOSINOPHIL NFR BLD AUTO: 4 %
ERYTHROCYTE [DISTWIDTH] IN BLOOD BY AUTOMATED COUNT: 13.5 % (ref 10–15)
GLUCOSE BLDC GLUCOMTR-MCNC: 122 MG/DL (ref 70–99)
GLUCOSE SERPL-MCNC: 138 MG/DL (ref 70–99)
HCO3 SERPL-SCNC: 26 MMOL/L (ref 22–29)
HCT VFR BLD AUTO: 38 % (ref 35–47)
HGB BLD-MCNC: 12.7 G/DL (ref 11.7–15.7)
IMM GRANULOCYTES # BLD: 0 10E3/UL
IMM GRANULOCYTES NFR BLD: 0 %
INR PPP: 2.04 (ref 0.85–1.15)
ISSUE DATE AND TIME: NORMAL
LIPASE SERPL-CCNC: 8 U/L (ref 13–60)
LYMPHOCYTES # BLD AUTO: 1.6 10E3/UL (ref 0.8–5.3)
LYMPHOCYTES NFR BLD AUTO: 29 %
MCH RBC QN AUTO: 30.7 PG (ref 26.5–33)
MCHC RBC AUTO-ENTMCNC: 33.4 G/DL (ref 31.5–36.5)
MCV RBC AUTO: 92 FL (ref 78–100)
MONOCYTES # BLD AUTO: 0.6 10E3/UL (ref 0–1.3)
MONOCYTES NFR BLD AUTO: 12 %
NEUTROPHILS # BLD AUTO: 2.9 10E3/UL (ref 1.6–8.3)
NEUTROPHILS NFR BLD AUTO: 54 %
NRBC # BLD AUTO: 0 10E3/UL
NRBC BLD AUTO-RTO: 0 /100
PLAT MORPH BLD: NORMAL
PLATELET # BLD AUTO: 2 10E3/UL (ref 150–450)
POTASSIUM SERPL-SCNC: 4.7 MMOL/L (ref 3.4–5.3)
PROT SERPL-MCNC: 7.7 G/DL (ref 6.4–8.3)
RBC # BLD AUTO: 4.14 10E6/UL (ref 3.8–5.2)
RBC MORPH BLD: NORMAL
SODIUM SERPL-SCNC: 140 MMOL/L (ref 135–145)
SPECIMEN EXPIRATION DATE: NORMAL
UNIT ABO/RH: NORMAL
UNIT NUMBER: NORMAL
UNIT STATUS: NORMAL
UNIT TYPE ISBT: 6200
WBC # BLD AUTO: 5.3 10E3/UL (ref 4–11)

## 2024-08-30 PROCEDURE — 36415 COLL VENOUS BLD VENIPUNCTURE: CPT

## 2024-08-30 PROCEDURE — 87637 SARSCOV2&INF A&B&RSV AMP PRB: CPT | Performed by: INTERNAL MEDICINE

## 2024-08-30 PROCEDURE — 36430 TRANSFUSION BLD/BLD COMPNT: CPT | Mod: 59

## 2024-08-30 PROCEDURE — 80048 BASIC METABOLIC PNL TOTAL CA: CPT | Performed by: EMERGENCY MEDICINE

## 2024-08-30 PROCEDURE — 93005 ELECTROCARDIOGRAM TRACING: CPT

## 2024-08-30 PROCEDURE — 99285 EMERGENCY DEPT VISIT HI MDM: CPT | Mod: 25

## 2024-08-30 PROCEDURE — 82962 GLUCOSE BLOOD TEST: CPT

## 2024-08-30 PROCEDURE — 86900 BLOOD TYPING SEROLOGIC ABO: CPT | Performed by: EMERGENCY MEDICINE

## 2024-08-30 PROCEDURE — 85610 PROTHROMBIN TIME: CPT | Performed by: EMERGENCY MEDICINE

## 2024-08-30 PROCEDURE — 120N000001 HC R&B MED SURG/OB

## 2024-08-30 PROCEDURE — 74177 CT ABD & PELVIS W/CONTRAST: CPT

## 2024-08-30 PROCEDURE — 250N000011 HC RX IP 250 OP 636: Performed by: INTERNAL MEDICINE

## 2024-08-30 PROCEDURE — 99223 1ST HOSP IP/OBS HIGH 75: CPT | Performed by: INTERNAL MEDICINE

## 2024-08-30 PROCEDURE — 85610 PROTHROMBIN TIME: CPT

## 2024-08-30 PROCEDURE — 80053 COMPREHEN METABOLIC PANEL: CPT | Performed by: EMERGENCY MEDICINE

## 2024-08-30 PROCEDURE — 85025 COMPLETE CBC W/AUTO DIFF WBC: CPT | Performed by: EMERGENCY MEDICINE

## 2024-08-30 PROCEDURE — 82248 BILIRUBIN DIRECT: CPT | Performed by: EMERGENCY MEDICINE

## 2024-08-30 PROCEDURE — 36415 COLL VENOUS BLD VENIPUNCTURE: CPT | Performed by: EMERGENCY MEDICINE

## 2024-08-30 PROCEDURE — P9037 PLATE PHERES LEUKOREDU IRRAD: HCPCS | Performed by: EMERGENCY MEDICINE

## 2024-08-30 PROCEDURE — 83690 ASSAY OF LIPASE: CPT | Performed by: EMERGENCY MEDICINE

## 2024-08-30 RX ORDER — NICOTINE POLACRILEX 4 MG
15-30 LOZENGE BUCCAL
Status: DISCONTINUED | OUTPATIENT
Start: 2024-08-30 | End: 2024-09-07 | Stop reason: HOSPADM

## 2024-08-30 RX ORDER — PANTOPRAZOLE SODIUM 40 MG/1
40 TABLET, DELAYED RELEASE ORAL
Status: DISCONTINUED | OUTPATIENT
Start: 2024-08-31 | End: 2024-09-01

## 2024-08-30 RX ORDER — IOPAMIDOL 755 MG/ML
500 INJECTION, SOLUTION INTRAVASCULAR ONCE
Status: COMPLETED | OUTPATIENT
Start: 2024-08-30 | End: 2024-08-30

## 2024-08-30 RX ORDER — SODIUM CHLORIDE 9 MG/ML
INJECTION, SOLUTION INTRAVENOUS CONTINUOUS
Status: DISCONTINUED | OUTPATIENT
Start: 2024-08-30 | End: 2024-08-30

## 2024-08-30 RX ORDER — ONDANSETRON 2 MG/ML
4 INJECTION INTRAMUSCULAR; INTRAVENOUS EVERY 6 HOURS PRN
Status: DISCONTINUED | OUTPATIENT
Start: 2024-08-30 | End: 2024-09-07 | Stop reason: HOSPADM

## 2024-08-30 RX ORDER — ONDANSETRON 4 MG/1
4 TABLET, ORALLY DISINTEGRATING ORAL EVERY 6 HOURS PRN
Status: DISCONTINUED | OUTPATIENT
Start: 2024-08-30 | End: 2024-09-07 | Stop reason: HOSPADM

## 2024-08-30 RX ORDER — CALCIUM CARBONATE 500 MG/1
1000 TABLET, CHEWABLE ORAL 4 TIMES DAILY PRN
Status: DISCONTINUED | OUTPATIENT
Start: 2024-08-30 | End: 2024-09-07 | Stop reason: HOSPADM

## 2024-08-30 RX ORDER — DEXTROSE MONOHYDRATE 25 G/50ML
25-50 INJECTION, SOLUTION INTRAVENOUS
Status: DISCONTINUED | OUTPATIENT
Start: 2024-08-30 | End: 2024-09-07 | Stop reason: HOSPADM

## 2024-08-30 RX ADMIN — IOPAMIDOL 100 ML: 755 INJECTION, SOLUTION INTRAVENOUS at 23:17

## 2024-08-30 ASSESSMENT — ACTIVITIES OF DAILY LIVING (ADL)
ADLS_ACUITY_SCORE: 38
ADLS_ACUITY_SCORE: 36
ADLS_ACUITY_SCORE: 38
ADLS_ACUITY_SCORE: 38

## 2024-08-30 NOTE — TELEPHONE ENCOUNTER
"S-(situation): Called patient at request of INR nurse (see other TE from today) due to concerns of extensive bruising, petechiae throughout her body. Patient also reports dizziness when bending over. Also reports feeling like she has gained significant weight (maybe 15 lb recently) and that her feet are very swollen bilaterally. No CP or SOB     B-(background): Hx of CHF, a-fib on coumadin    A-(assessment): Concern for possible internal bleeding given extensive bruising on coumadin and dizziness along with potential for CHF exacerbation.    R-(recommendations): Go to ER now for evaluation. Patient will call her daughter to take her there now. Called daughter as well and left message to take her mom or call RN line with questions. Patient told to call 911 for ambulance to hospital if she does not hear back within an hour.    Shannon Pink RN        Reason for Disposition   Dizziness or lightheadedness    Additional Information   Negative: Shock suspected (e.g., cold/pale/clammy skin, too weak to stand, low BP, rapid pulse)   Negative: Fever and purple or blood-colored spots or dots   Negative: Sounds like a life-threatening emergency to the triager   Negative: Bruise(s) of forehead or head   Negative: Bruise(s) of face or jaw   Negative: Patient has a concerning injury (e.g., chest, neck, leg)   Negative: Post-operative bruising    Answer Assessment - Initial Assessment Questions  1. APPEARANCE of BRUISE: \"Describe the bruise.\"       Arms, wrists, between fingers, petechiae down her legs  2. SIZE: \"How large is the bruise?\"       Baseball size at largest on her arm  3. NUMBER: \"How many bruises are there?\"       Many  4. LOCATION: \"Where is the bruise located?\"       All over-see above  5. ONSET: \"How long ago did the bruise occur?\"       2 days  6. CAUSE: \"Tell me how it happened.\"      Bump-on coumadin  7. MEDICAL HISTORY: \"Do you have any medical problems that can cause easy bruising or bleeding?\" (e.g., leukemia, " "liver disease, recent chemotherapy)      coumadin  8. MEDICINES: \"Do you take any medications which thin the blood such as: aspirin, heparin, ibuprofen (NSAIDS), Plavix, or Coumadin?\"      coumadin  9. OTHER SYMPTOMS: \"Do you have any other symptoms?\"  (e.g., weakness, dizziness, pain, fever, nosebleed, blood in urine/stool)      No pain. Has felt weak for a long time. No dizziness when walking with her walker, but gets dizzy with bending over  10. PREGNANCY: \"Is there any chance you are pregnant?\" \"When was your last menstrual period?\"        N/A    Protocols used: Bruises-A-OH    "

## 2024-08-30 NOTE — ED PROVIDER NOTES
"Emergency Department Note      Code Status: No CPR- Pre-arrest intubation OK    History of Present Illness     Chief Complaint:  Rash       HPI   Savanna Rehman is a 83 year old female with a history of diabetes, hyperlipidemia, non alcoholic cirrhosis of the liver, and atrial fibrillation anticoagulated on Warfarin who presents with a complaint of a rash. The patient reports that 2 days ago, she noticed petechiae on her bilateral lower extremities. Since then, it has continued to spread across her body and her upper extremities. It has also started to appear more red and she feels that it has worsened. She endorses bleeding from either her vagina or rectum, but is unsure which it is. She also endorses some increased shortness of breath, but notes that she has not had this for the last few days and that it may be anxiety related, in addition to diarrhea, although it is not acute. She denies any nausea, vomiting, or hematuria. She notes that she is followed by [facility]. Her last normal colonoscopy was about a year ago. She denies any history of blood clots. She also notes that she did have a blood infusion around 15 years ago. Of note, she reports that she is DNR.    Independent Historian:    None    Review of External Notes  Cardiology clinic note 8/2/24: follow up for heart failure symptoms. Lasix increased in June. BNP improved. Still dome mild peripheral edema. Lymphedema clinic referral discussed.    Past Medical History   Medical History, Surgical History, Problem List, and Medications  Reviewed in Epic    Physical Exam   Patient Vitals for the past 24 hrs:   BP Temp Temp src Pulse Resp SpO2 Height Weight   08/31/24 0028 (!) 147/61 98  F (36.7  C) Oral 81 16 92 % -- --   08/31/24 0020 -- -- -- -- -- -- 1.727 m (5' 8\") 112.7 kg (248 lb 6.4 oz)   08/30/24 2310 -- -- -- 66 21 97 % -- --   08/30/24 2300 -- -- -- 60 19 97 % -- --   08/30/24 2230 -- -- -- 70 14 97 % -- --   08/30/24 2210 (!) 169/79 98  F (36.7  C) -- " "60 15 97 % -- --   08/30/24 2200 -- -- -- 68 21 97 % -- --   08/30/24 2152 -- -- -- 65 13 99 % -- --   08/30/24 2150 -- -- -- -- 23 -- -- --   08/30/24 2130 (!) 162/80 98  F (36.7  C) Oral 62 12 98 % -- --   08/30/24 2020 (!) 164/80 -- Oral 61 11 97 % -- --   08/30/24 2010 -- -- -- 60 11 98 % -- --   08/30/24 2000 (!) 172/87 -- Oral 62 (!) 7 97 % -- --   08/30/24 1950 (!) 165/78 -- Oral 60 21 99 % -- --   08/30/24 1945 (!) 165/78 98  F (36.7  C) Oral 59 14 99 % -- --   08/30/24 1930 (!) 162/85 98.1  F (36.7  C) Oral 64 11 97 % -- --   08/30/24 1701 (!) 170/101 97.6  F (36.4  C) Temporal 84 18 94 % 1.727 m (5' 8\") 112.5 kg (248 lb 0.3 oz)       Physical Exam  Constitutional: Vital signs reviewed as above.   Eyes: PEERL, EOMI B/L  Oropharynx: Small petechiae on soft palate.  Neck: No JVD noted. FROM   Cardiovascular: normal rate, Regular rhythm and normal heart sounds.  No murmur heard. Equal B/L peripheral pulses.  Pulmonary/Chest: Effort normal and breath sounds normal. No respiratory distress. Patient has no wheezes. Patient has no rales.   Gastrointestinal: Soft. There is no tenderness.   Musculoskeletal/Extremities: No pitting edema noted. Normal tone.  Skin: Skin is warm and dry. There is no diaphoresis noted. There are multiple areas of petechiae and purpura on the exposed extremities  Psychiatric: The patient appears calm.     Diagnostics     Laboratory: Imaging:   Labs Ordered and Resulted from Time of ED Arrival to Time of ED Departure   BASIC METABOLIC PANEL - Abnormal       Result Value    Sodium 140      Potassium 4.7      Chloride 102      Carbon Dioxide (CO2) 26      Anion Gap 12      Urea Nitrogen 18.0      Creatinine 1.04 (*)     GFR Estimate 54 (*)     Calcium 9.1      Glucose 138 (*)    INR - Abnormal    INR 2.04 (*)    CBC WITH PLATELETS AND DIFFERENTIAL - Abnormal    WBC Count 5.3      RBC Count 4.14      Hemoglobin 12.7      Hematocrit 38.0      MCV 92      MCH 30.7      MCHC 33.4      RDW 13.5   "    Platelet Count 2 (*)     % Neutrophils 54      % Lymphocytes 29      % Monocytes 12      % Eosinophils 4      % Basophils 1      % Immature Granulocytes 0      NRBCs per 100 WBC 0      Absolute Neutrophils 2.9      Absolute Lymphocytes 1.6      Absolute Monocytes 0.6      Absolute Eosinophils 0.2      Absolute Basophils 0.0      Absolute Immature Granulocytes 0.0      Absolute NRBCs 0.0     HEPATIC FUNCTION PANEL - Abnormal    Protein Total 7.7      Albumin 3.8      Bilirubin Total 1.2      Alkaline Phosphatase 135      AST 40      ALT 34      Bilirubin Direct 0.33 (*)    LIPASE - Abnormal    Lipase 8 (*)    GLUCOSE BY METER - Abnormal    GLUCOSE BY METER POCT 122 (*)    RBC AND PLATELET MORPHOLOGY    RBC Morphology Confirmed RBC Indices      Platelet Assessment        Value: Automated Count Confirmed. Platelet morphology is normal.   GLUCOSE MONITOR NURSING POCT   GLUCOSE MONITOR NURSING POCT   GLUCOSE MONITOR NURSING POCT   GLUCOSE MONITOR NURSING POCT   GLUCOSE MONITOR NURSING POCT   TYPE AND SCREEN, ADULT    ABO/RH(D) O NEG      Antibody Screen Negative      SPECIMEN EXPIRATION DATE [id number]     PREPARE PHERESED PLATELETS (UNIT)    Blood Component Type Platelets      Product Code R9502D82      Unit Status Transfused      Unit Number [id number]      CODING SYSTEM [id number]      ISSUE DATE AND TIME [id number]      UNIT ABO/RH A+      UNIT TYPE ISBT 6200     PREPARE PHERESED PLATELETS (UNIT)   TRANSFUSE PHERESED PLATELETS (UNIT)   ABO/RH TYPE AND SCREEN           EKG   ECG results from 08/30/24   EKG 12 lead     Value    Systolic Blood Pressure     Diastolic Blood Pressure     Ventricular Rate 83    Atrial Rate 91    NY Interval     QRS Duration 98        QTc 413    P Axis     R AXIS -32    T Axis 123    Interpretation ECG      Ventricular-paced rhythm  Abnormal ECG  When compared with ECG of 28-Jun-2024 10:32,  Vent. rate has increased by  23 bpm  Interpreted by me at 1716     *Note: Due to a  large number of results and/or encounters for the requested time period, some results have not been displayed. A complete set of results can be found in Results Review.       Independent Interpretation  See ED course    ED Course    Medications Administered  Medications   sodium chloride (PF) 0.9% PF flush 3 mL (3 mLs Intracatheter Not Given 8/30/24 2304)   sodium chloride (PF) 0.9% PF flush 3 mL (has no administration in time range)   calcium carbonate (TUMS) chewable tablet 1,000 mg (has no administration in time range)   glucose gel 15-30 g (has no administration in time range)     Or   dextrose 50 % injection 25-50 mL (has no administration in time range)     Or   glucagon injection 1 mg (has no administration in time range)   Patient is already receiving anticoagulation with heparin, enoxaparin (LOVENOX), warfarin (COUMADIN)  or other anticoagulant medication (has no administration in time range)   melatonin tablet 1 mg (has no administration in time range)   ondansetron (ZOFRAN ODT) ODT tab 4 mg (has no administration in time range)     Or   ondansetron (ZOFRAN) injection 4 mg (has no administration in time range)   insulin aspart (NovoLOG) injection (RAPID ACTING) (has no administration in time range)   insulin aspart (NovoLOG) injection (RAPID ACTING) (has no administration in time range)   pantoprazole (PROTONIX) EC tablet 40 mg (has no administration in time range)   sodium chloride (PF) 0.9% PF flush 100 mL (65 mLs Intravenous $Given 8/30/24 2319)   iopamidol (ISOVUE-370) solution 500 mL (100 mLs Intravenous $Given 8/30/24 2317)       Procedures  Procedures     Discussion of Management  See ED Course    ED Course  ED Course as of 08/31/24 0102   Fri Aug 30, 2024   1757 I initially assessed the patient and obtained the history and physical exam.    1809 I provided the patient with a platelet transfusion consent form. I explained why she needs a platelet infusion to the patient and her daughter.    2021 I  rechecked and updated the patient, as nurses informed me she was having increased shortness of breath and feeling as though her throat was closing.   2025 Rechecked. Nursing stated that the patient felt more short of breath. The patient's petechae are more notable on the hands. No wheezing. No stridor. No oropharyngeal swelling.   2047 D/W Dr. Wakefield. Ok for admit.       Optional/Additional Documentation: None    Medical Decision Making / Diagnosis     MIPS         Medical Decision Making:  This 83-year-old presents due to a rash.  Please see the HPI and exam for specifics.  The patient has notable petechiae and some areas of purpura.  Her platelet level was found to be 2.  It does not sound like she has ever had thrombocytopenia before. It does appear she has some degree of baseline chronic renal disease though her GFR and creatinine look better today than I have previously.  The patient had also mentioned some degree of possible rectal bleeding.  platelet transfusion was ordered.  During the transfusion, the patient perceived some itching and perceived her petechiae had worsened.  On reassessment, I was able to better appreciate the petechia on the patient's hands though this may be due to the better lighting in room 3 as compared with intake room 2 where I initially saw the patient.  She had no wheezing nor stridor.    I discussed the case with the admitting hospitalist and we will bring the patient in for further monitoring and care.    Critical Care:  None.    Disposition:  See ED Course and MDM    ICD-10 Codes:    ICD-10-CM    1. Thrombocytopenia (H24)  D69.6            Discharge Medications:  Current Discharge Medication List         Scribe Disclosure:  I, Meg Tipton, am serving as a scribe at 5:57 PM on 8/30/2024 to document services personally performed by Peter Tello DO based on my observations and the provider's statements to me.     8/30/2024   Peter Tello DO     Emergency  [employer]         Peter Tello DO  08/31/24 0102

## 2024-08-30 NOTE — ED NOTES
DATE/TIME OF CALL RECEIVED FROM LAB:  08/30/24 at 5:53 PM   LAB TEST:  plt  LAB VALUE:  2  PROVIDER NOTIFIED?: Yes  PROVIDER NAME: Tello  DATE/TIME LAB VALUE REPORTED TO PROVIDER: 1757  MECHANISM OF PROVIDER NOTIFICATION: Face-To-Face  PROVIDER RESPONSE: next to be seen     Patient

## 2024-08-30 NOTE — TELEPHONE ENCOUNTER
"Patient left a voice mail message on the home care line stating she was advised to call regarding \"a lot of bruising and bleeding that has been going on under my skin\", advised by home care nurse to call [facility] to describe all of her symptoms.      Patient sits in her lift chair a lot and has a baseball size bruise on her elbow. She also describes having \"red dots\" on her legs like a \"malick took their pin and went dot, dot, dot down my leg\".  Patient also notes she has purple \"smears\" in between her fingers and her arms from elbows distally to the hands are tiny little red dots.  Patient also notes she bumped her wrist and now has a large bruise on her wrists.  She does not recall any other injuries to explain the \"purple\" in between her fingers or red dots on her arms or legs.    Patient also thinks she has gained several pounds but cannot confirm an actual weight but states her feet are so swollen they do not look like \"feet\".    Patient was seen by home care on 8/27/24 and states all of these symptoms occurred after being seen by home care.    Patient states she feels like this is the last year of her life.  [place] because [place] the [place].   Patient also states she has been feeling dizzy but states this has been going on the past 2 months.  States she does not feel dizzy all the time.      Advised patient be seen in  or the ED and patient replied, \"I knew you were going to say that\" and further stated, \"I just can't keep up with all the bills and a ambulance ride is $270 and what are they going to do for me, nothing, they're just going to send me home\".    Advised patient to speak with a triage nurse for further recommendations.    Spoke with triage nurse Shannon who stated she will call the patient.    EMILY Johnson, RN  [facility]    "

## 2024-08-30 NOTE — ED TRIAGE NOTES
"INR on Tuesday 3.8. Pt noticed petechiae to BLE and now all over. Pt is anxious and \"a little SOB\".        "

## 2024-08-31 LAB
ALBUMIN SERPL BCG-MCNC: 3.4 G/DL (ref 3.5–5.2)
ALP SERPL-CCNC: 113 U/L (ref 40–150)
ALT SERPL W P-5'-P-CCNC: 30 U/L (ref 0–50)
ANION GAP SERPL CALCULATED.3IONS-SCNC: 12 MMOL/L (ref 7–15)
AST SERPL W P-5'-P-CCNC: 46 U/L (ref 0–45)
BILIRUB SERPL-MCNC: 1.9 MG/DL
BLD PROD TYP BPU: NORMAL
BLD PROD TYP BPU: NORMAL
BLOOD COMPONENT TYPE: NORMAL
BLOOD COMPONENT TYPE: NORMAL
BUN SERPL-MCNC: 16.6 MG/DL (ref 8–23)
CALCIUM SERPL-MCNC: 8.9 MG/DL (ref 8.8–10.4)
CHLORIDE SERPL-SCNC: 99 MMOL/L (ref 98–107)
CODING SYSTEM: NORMAL
CODING SYSTEM: NORMAL
CREAT SERPL-MCNC: 0.95 MG/DL (ref 0.51–0.95)
EGFRCR SERPLBLD CKD-EPI 2021: 60 ML/MIN/1.73M2
ERYTHROCYTE [DISTWIDTH] IN BLOOD BY AUTOMATED COUNT: 13.6 % (ref 10–15)
FLUAV RNA SPEC QL NAA+PROBE: NEGATIVE
FLUBV RNA RESP QL NAA+PROBE: NEGATIVE
GLUCOSE BLDC GLUCOMTR-MCNC: 127 MG/DL (ref 70–99)
GLUCOSE BLDC GLUCOMTR-MCNC: 132 MG/DL (ref 70–99)
GLUCOSE BLDC GLUCOMTR-MCNC: 133 MG/DL (ref 70–99)
GLUCOSE BLDC GLUCOMTR-MCNC: 156 MG/DL (ref 70–99)
GLUCOSE BLDC GLUCOMTR-MCNC: 325 MG/DL (ref 70–99)
GLUCOSE SERPL-MCNC: 132 MG/DL (ref 70–99)
HCO3 SERPL-SCNC: 25 MMOL/L (ref 22–29)
HCT VFR BLD AUTO: 34.2 % (ref 35–47)
HGB BLD-MCNC: 11.2 G/DL (ref 11.7–15.7)
ISSUE DATE AND TIME: NORMAL
ISSUE DATE AND TIME: NORMAL
MCH RBC QN AUTO: 30.4 PG (ref 26.5–33)
MCHC RBC AUTO-ENTMCNC: 32.7 G/DL (ref 31.5–36.5)
MCV RBC AUTO: 93 FL (ref 78–100)
PLATELET # BLD AUTO: 6 10E3/UL (ref 150–450)
POTASSIUM SERPL-SCNC: 4.3 MMOL/L (ref 3.4–5.3)
PROT SERPL-MCNC: 6.6 G/DL (ref 6.4–8.3)
RBC # BLD AUTO: 3.69 10E6/UL (ref 3.8–5.2)
RSV RNA SPEC NAA+PROBE: NEGATIVE
SARS-COV-2 RNA RESP QL NAA+PROBE: NEGATIVE
SODIUM SERPL-SCNC: 136 MMOL/L (ref 135–145)
UNIT ABO/RH: NORMAL
UNIT ABO/RH: NORMAL
UNIT NUMBER: NORMAL
UNIT NUMBER: NORMAL
UNIT STATUS: NORMAL
UNIT STATUS: NORMAL
UNIT TYPE ISBT: 6200
UNIT TYPE ISBT: 6200
WBC # BLD AUTO: 4 10E3/UL (ref 4–11)

## 2024-08-31 PROCEDURE — 250N000013 HC RX MED GY IP 250 OP 250 PS 637: Performed by: INTERNAL MEDICINE

## 2024-08-31 PROCEDURE — 250N000011 HC RX IP 250 OP 636: Performed by: HOSPITALIST

## 2024-08-31 PROCEDURE — 99232 SBSQ HOSP IP/OBS MODERATE 35: CPT | Performed by: HOSPITALIST

## 2024-08-31 PROCEDURE — 250N000011 HC RX IP 250 OP 636: Performed by: INTERNAL MEDICINE

## 2024-08-31 PROCEDURE — 120N000001 HC R&B MED SURG/OB

## 2024-08-31 PROCEDURE — 80053 COMPREHEN METABOLIC PANEL: CPT | Performed by: INTERNAL MEDICINE

## 2024-08-31 PROCEDURE — 250N000013 HC RX MED GY IP 250 OP 250 PS 637: Performed by: HOSPITALIST

## 2024-08-31 PROCEDURE — 250N000012 HC RX MED GY IP 250 OP 636 PS 637: Performed by: INTERNAL MEDICINE

## 2024-08-31 PROCEDURE — 99223 1ST HOSP IP/OBS HIGH 75: CPT | Performed by: INTERNAL MEDICINE

## 2024-08-31 PROCEDURE — P9037 PLATE PHERES LEUKOREDU IRRAD: HCPCS | Performed by: HOSPITALIST

## 2024-08-31 PROCEDURE — 36415 COLL VENOUS BLD VENIPUNCTURE: CPT | Performed by: INTERNAL MEDICINE

## 2024-08-31 PROCEDURE — 85027 COMPLETE CBC AUTOMATED: CPT | Performed by: INTERNAL MEDICINE

## 2024-08-31 PROCEDURE — 30233S1 TRANSFUSION OF NONAUTOLOGOUS GLOBULIN INTO PERIPHERAL VEIN, PERCUTANEOUS APPROACH: ICD-10-PCS | Performed by: INTERNAL MEDICINE

## 2024-08-31 RX ORDER — LOPERAMIDE HCL 2 MG
2 CAPSULE ORAL 4 TIMES DAILY PRN
Status: DISCONTINUED | OUTPATIENT
Start: 2024-08-31 | End: 2024-09-07 | Stop reason: HOSPADM

## 2024-08-31 RX ORDER — FAMOTIDINE 10 MG
10 TABLET ORAL 2 TIMES DAILY
Status: DISCONTINUED | OUTPATIENT
Start: 2024-08-31 | End: 2024-09-07 | Stop reason: HOSPADM

## 2024-08-31 RX ORDER — ACETAMINOPHEN 325 MG/1
650 TABLET ORAL
Status: DISCONTINUED | OUTPATIENT
Start: 2024-08-31 | End: 2024-09-02

## 2024-08-31 RX ORDER — GLIPIZIDE 2.5 MG/1
2.5 TABLET, EXTENDED RELEASE ORAL
Status: DISCONTINUED | OUTPATIENT
Start: 2024-08-31 | End: 2024-09-07 | Stop reason: HOSPADM

## 2024-08-31 RX ORDER — ACETAMINOPHEN 325 MG/1
650 TABLET ORAL EVERY 4 HOURS PRN
Status: DISCONTINUED | OUTPATIENT
Start: 2024-08-31 | End: 2024-09-07 | Stop reason: HOSPADM

## 2024-08-31 RX ORDER — DIPHENHYDRAMINE HYDROCHLORIDE 50 MG/ML
50 INJECTION INTRAMUSCULAR; INTRAVENOUS EVERY 24 HOURS
Status: COMPLETED | OUTPATIENT
Start: 2024-08-31 | End: 2024-09-01

## 2024-08-31 RX ORDER — ACETAMINOPHEN 325 MG/1
650 TABLET ORAL EVERY 24 HOURS
Status: COMPLETED | OUTPATIENT
Start: 2024-08-31 | End: 2024-09-01

## 2024-08-31 RX ORDER — METHYLPREDNISOLONE SODIUM SUCCINATE 125 MG/2ML
125 INJECTION, POWDER, LYOPHILIZED, FOR SOLUTION INTRAMUSCULAR; INTRAVENOUS
Status: DISCONTINUED | OUTPATIENT
Start: 2024-08-31 | End: 2024-09-02

## 2024-08-31 RX ORDER — DIPHENHYDRAMINE HYDROCHLORIDE 50 MG/ML
50 INJECTION INTRAMUSCULAR; INTRAVENOUS
Status: DISCONTINUED | OUTPATIENT
Start: 2024-08-31 | End: 2024-09-02

## 2024-08-31 RX ORDER — FUROSEMIDE 40 MG
40 TABLET ORAL DAILY
Status: DISCONTINUED | OUTPATIENT
Start: 2024-08-31 | End: 2024-09-07 | Stop reason: HOSPADM

## 2024-08-31 RX ORDER — FUROSEMIDE 10 MG/ML
40 INJECTION INTRAMUSCULAR; INTRAVENOUS ONCE
Status: COMPLETED | OUTPATIENT
Start: 2024-08-31 | End: 2024-08-31

## 2024-08-31 RX ORDER — ESCITALOPRAM OXALATE 10 MG/1
10 TABLET ORAL AT BEDTIME
Status: DISCONTINUED | OUTPATIENT
Start: 2024-08-31 | End: 2024-09-07 | Stop reason: HOSPADM

## 2024-08-31 RX ORDER — DIPHENHYDRAMINE HCL 25 MG
50 CAPSULE ORAL
Status: DISCONTINUED | OUTPATIENT
Start: 2024-08-31 | End: 2024-09-02

## 2024-08-31 RX ORDER — DIPHENHYDRAMINE HCL 25 MG
50 CAPSULE ORAL EVERY 24 HOURS
Status: COMPLETED | OUTPATIENT
Start: 2024-08-31 | End: 2024-09-01

## 2024-08-31 RX ORDER — DEXAMETHASONE 4 MG/1
40 TABLET ORAL DAILY
Status: COMPLETED | OUTPATIENT
Start: 2024-08-31 | End: 2024-09-03

## 2024-08-31 RX ORDER — ACETAMINOPHEN 650 MG/1
650 SUPPOSITORY RECTAL EVERY 4 HOURS PRN
Status: DISCONTINUED | OUTPATIENT
Start: 2024-08-31 | End: 2024-09-07 | Stop reason: HOSPADM

## 2024-08-31 RX ADMIN — FUROSEMIDE 40 MG: 40 TABLET ORAL at 15:15

## 2024-08-31 RX ADMIN — DEXAMETHASONE 40 MG: 4 TABLET ORAL at 15:15

## 2024-08-31 RX ADMIN — DIPHENHYDRAMINE HYDROCHLORIDE 50 MG: 25 CAPSULE ORAL at 17:12

## 2024-08-31 RX ADMIN — FAMOTIDINE 10 MG: 10 TABLET, FILM COATED ORAL at 15:15

## 2024-08-31 RX ADMIN — ACETAMINOPHEN 325MG 650 MG: 325 TABLET ORAL at 17:12

## 2024-08-31 RX ADMIN — GLIPIZIDE 2.5 MG: 2.5 TABLET, FILM COATED, EXTENDED RELEASE ORAL at 17:13

## 2024-08-31 RX ADMIN — HUMAN IMMUNOGLOBULIN G 63.9 G: 40 LIQUID INTRAVENOUS at 17:58

## 2024-08-31 RX ADMIN — ESCITALOPRAM OXALATE 10 MG: 10 TABLET ORAL at 22:45

## 2024-08-31 RX ADMIN — ACETAMINOPHEN 650 MG: 325 TABLET, FILM COATED ORAL at 01:51

## 2024-08-31 RX ADMIN — FUROSEMIDE 40 MG: 10 INJECTION, SOLUTION INTRAMUSCULAR; INTRAVENOUS at 21:06

## 2024-08-31 RX ADMIN — PANTOPRAZOLE SODIUM 40 MG: 40 TABLET, DELAYED RELEASE ORAL at 06:17

## 2024-08-31 RX ADMIN — INSULIN ASPART 2 UNITS: 100 INJECTION, SOLUTION INTRAVENOUS; SUBCUTANEOUS at 22:45

## 2024-08-31 RX ADMIN — Medication 1 MG: at 22:45

## 2024-08-31 RX ADMIN — FAMOTIDINE 10 MG: 10 TABLET, FILM COATED ORAL at 21:06

## 2024-08-31 ASSESSMENT — ACTIVITIES OF DAILY LIVING (ADL)
ADLS_ACUITY_SCORE: 40
ADLS_ACUITY_SCORE: 34
ADLS_ACUITY_SCORE: 40
ADLS_ACUITY_SCORE: 38
ADLS_ACUITY_SCORE: 40
ADLS_ACUITY_SCORE: 38
ADLS_ACUITY_SCORE: 40
ADLS_ACUITY_SCORE: 36
ADLS_ACUITY_SCORE: 40
ADLS_ACUITY_SCORE: 38

## 2024-08-31 NOTE — ED NOTES
Pt VSS,, alert and oriented when first assess.  Agreeable platelet transfusion, pt educated, question answered. Pt tolerated platelet transfusion in the first 15 min. No transfusion rxn reported, Vss.

## 2024-08-31 NOTE — ED NOTES
[facility]  ED Nurse Handoff Report    ED Chief complaint: Rash  . ED Diagnosis:   Final diagnoses:   None       Allergies:   Allergies   Allergen Reactions    Augmented Betamethasone Diprop [Betamethasone] Other (See Comments)     Severe yeast infection    Petrolatum Anaphylaxis and Swelling     Rash and swelling    Shellfish-Derived Products Anaphylaxis     Tongue swelling    Aspirin Swelling     tongue swelling    Bacitracin      Rash swelling    Bactrim [Sulfamethoxazole-Trimethoprim] Dizziness    Darvon [Propoxyphene] Swelling     Throat closes    Dilaudid [Hydromorphone]      No side effects from fentanyl June 2023    Levaquin [Levofloxacin] Swelling     Tongue swelling    Lidocaine      Facial swelling     Morphine Unknown     [facility]    Neomycin Swelling     rash    Neosporin [Neomycin-Polymyxin-Gramicidin] Swelling     rash    Nitrofurantoin      SOB, GI upset,    Oxycodone      Severe itching    Percocet [Oxycodone-Acetaminophen] Unknown    Percodan [Oxycodone-Aspirin]      Severe itching    Polymyxin B     Pramoxine     Tramadol     Vicodin [Hydrocodone-Acetaminophen]      Severe itching      Xarelto [Rivaroxaban]     Adhesive Tape Rash     Band aids     Codeine Rash    Hydrocortisone Rash and Swelling    Other Environmental Allergy Rash     Adhesive tape   Band aids        Code Status: DNR    Activity level - Baseline/Home:  walker.  Activity Level - Current:   standby and walker.   Lift room needed: No.   Bariatric: No   Needed: No   Isolation: No.   Infection: Not Applicable.     Respiratory status: Room air    Vital Signs (within 30 minutes):   Vitals:    08/30/24 2000 08/30/24 2010 08/30/24 2020 08/30/24 2150   BP: (!) 172/87  (!) 164/80    Pulse: 62 60 61    Resp: (!) 7 11 11 23   Temp:       TempSrc: Oral  Oral    SpO2: 97% 98% 97%    Weight:       Height:           Cardiac Rhythm:  ,      Pain level:    Patient confused: No.   Patient  Falls Risk: activity supervised, mobility aid in reach, and toileting schedule implemented.   Elimination Status: Has voided - Incontinent    Patient Report - Initial Complaint: increased petechia, sent from hematologist .   Focused Assessment:  Savanna Rehman is a 83 year old female with a history of diabetes, hyperlipidemia, non alcoholic cirrhosis of the liver, and atrial fibrillation anticoagulated on Warfarin who presents with a complaint of a rash. The patient reports that 2 days ago, she noticed petechiae on her bilateral lower extremities. Since then, it has continued to spread across her body and her upper extremities. It has also started to appear more red and she feels that it has worsened. She endorses bleeding from either her vagina or rectum, but is unsure which it is. She also endorses some increased shortness of breath, but notes that she has not had this for the last few days and that it may be anxiety related, in addition to diarrhea, although it is not acute. She denies any nausea, vomiting, or hematuria. She notes that she is followed by [facility]. Her last normal colonoscopy was about a year ago. She denies any history of blood clots. She also notes that she did have a blood infusion around 15 years ago. Of note, she reports that she is DNR.     Abnormal Results:   Labs Ordered and Resulted from Time of ED Arrival to Time of ED Departure   BASIC METABOLIC PANEL - Abnormal       Result Value    Sodium 140      Potassium 4.7      Chloride 102      Carbon Dioxide (CO2) 26      Anion Gap 12      Urea Nitrogen 18.0      Creatinine 1.04 (*)     GFR Estimate 54 (*)     Calcium 9.1      Glucose 138 (*)    INR - Abnormal    INR 2.04 (*)    CBC WITH PLATELETS AND DIFFERENTIAL - Abnormal    WBC Count 5.3      RBC Count 4.14      Hemoglobin 12.7      Hematocrit 38.0      MCV 92      MCH 30.7      MCHC 33.4      RDW 13.5      Platelet Count 2 (*)     % Neutrophils 54      % Lymphocytes 29      % Monocytes 12       % Eosinophils 4      % Basophils 1      % Immature Granulocytes 0      NRBCs per 100 WBC 0      Absolute Neutrophils 2.9      Absolute Lymphocytes 1.6      Absolute Monocytes 0.6      Absolute Eosinophils 0.2      Absolute Basophils 0.0      Absolute Immature Granulocytes 0.0      Absolute NRBCs 0.0     HEPATIC FUNCTION PANEL - Abnormal    Protein Total 7.7      Albumin 3.8      Bilirubin Total 1.2      Alkaline Phosphatase 135      AST 40      ALT 34      Bilirubin Direct 0.33 (*)    LIPASE - Abnormal    Lipase 8 (*)    GLUCOSE BY METER - Abnormal    GLUCOSE BY METER POCT 122 (*)    RBC AND PLATELET MORPHOLOGY    RBC Morphology Confirmed RBC Indices      Platelet Assessment        Value: Automated Count Confirmed. Platelet morphology is normal.   TYPE AND SCREEN, ADULT    ABO/RH(D) O NEG      Antibody Screen Negative      SPECIMEN EXPIRATION DATE 20240902235900     PREPARE PHERESED PLATELETS (UNIT)    Blood Component Type Platelets      Product Code [id number]      Unit Status Transfused      Unit Number [id number]      CODING SYSTEM FUHO326      ISSUE DATE AND TIME [id number]      UNIT ABO/RH A+      UNIT TYPE ISBT 6200     PREPARE PHERESED PLATELETS (UNIT)   TRANSFUSE PHERESED PLATELETS (UNIT)   ABO/RH TYPE AND SCREEN        CT Abdomen Pelvis w Contrast    (Results Pending)       Treatments provided: 1 unit of platelet   Family Comments: Daughter   OBS brochure/video discussed/provided to patient:  N/A  ED Medications: Medications - No data to display    Drips infusing:  No  For the majority of the shift this patient was Green.   Interventions performed were NA .    Sepsis treatment initiated: No    Cares/treatment/interventions/medications to be completed following ED care: NA     ED Nurse Name: Chayo Hayes RN  9:58 PM     RECEIVING UNIT ED HANDOFF REVIEW    Above ED Nurse Handoff Report was reviewed: Yes  Reviewed by: Dorothy Lara RN on August 30, 2024 at 11:56 PM   RXE Mcintosh called the ED to inform  them the note was read: Yes

## 2024-08-31 NOTE — ED NOTES
EDT reported pt took off cardiac sticker due to allergic rxn, endorses feeling burning to the site, she thought the rxn can be trigger by the contrast dye when she returned from CT. She temporarily removed all sticker.  No rash observed, no other symptoms reported by pt. Made hospitalist, Dr. Colunga and floor RN Dorothy aware.

## 2024-08-31 NOTE — ED NOTES
"Pt refusing cardiac monitoring due to her being \"allergic\" to the stickers all of a sudden. MD and RN notified       Doris Ferraro on 8/30/2024 at 11:45 PM    "

## 2024-08-31 NOTE — PHARMACY-ADMISSION MEDICATION HISTORY
Pharmacist Admission Medication History    Admission medication history is complete. The information provided in this note is only as accurate as the sources available at the time of the update.    Information Source(s): Patient, Clinic records, and CareEverywhere/SureScripts via in-person    Pertinent Information: None    Changes made to PTA medication list:  Added: None  Deleted:   Famotidine  Indapamide  Duplicate warfarin entries  Changed:   Furosemide BID --> 40 mg daily  Updated warfarin sig to 3.75mg M, 5mg ROW    Allergies reviewed with patient and updates made in EHR: no    Medication History Completed By: Lupe Root [facility] 8/31/2024 9:16 AM    PTA Med List   Medication Sig Last Dose    acetaminophen (TYLENOL) 500 MG tablet Take 1,000 mg by mouth every 6 hours as needed for mild pain Unknown at PRN    butalbital-aspirin-caffeine (FIORINAL) -40 MG capsule TAKE 1 CAPSULE BY MOUTH EVERY 4 HOURS AS NEEDED FOR HEADACHES OR  MIGRAINE Unknown at PRN    cholecalciferol (VITAMIN D3) 10 mcg (400 units) TABS tablet Take 10 mcg by mouth daily. Past Week    EPINEPHrine (ANY BX GENERIC EQUIV) 0.3 MG/0.3ML injection 2-pack Inject 0.3 mLs (0.3 mg) into the muscle as needed for anaphylaxis May repeat one time in 5-15 minutes if response to initial dose is inadequate. Unknown at PRN    escitalopram (LEXAPRO) 10 MG tablet Take 1 tablet (10 mg) by mouth at bedtime 8/29/2024    furosemide (LASIX) 40 MG tablet Take 1 tablet (40 mg) by mouth daily for 90 days 8/29/2024    glipiZIDE (GLUCOTROL XL) 2.5 MG 24 hr tablet Take 1 tablet by mouth twice daily 8/29/2024    loperamide (IMODIUM) 2 MG capsule TAKE 1 CAPSULE BY MOUTH 4 TIMES DAILY AS NEEDED FOR DIARRHEA Unknown at PRN    melatonin 3 MG tablet Take 3 mg by mouth nightly as needed for sleep. Unknown at PRN    omeprazole (PRILOSEC) 20 MG DR capsule Take 1 capsule (20 mg) by mouth daily 8/29/2024    warfarin ANTICOAGULANT (COUMADIN) 2.5 MG tablet Take by mouth daily. Take  3.75 mg on Mondays and 5 mg all other days of the week 8/28/2024

## 2024-08-31 NOTE — PROGRESS NOTES
[facility]    Medicine Progress Note - Hospitalist Service    Date of Admission:  8/30/2024    Assessment & Plan      Savanna Rehman is a 83 year old female admitted on 8/30/2024. She has a past medical history significant for atrial fibrillation, cirrhosis, congestive heart failure, coronary artery disease, diabetes mellitus type 2, fibromyalgia, GERD, gout, and sleep apnea.  She began noticing very small little red spots on her arms, legs, and abdomen starting 2 days ago.  Seems to be getting more of the spots.  Presented to emergency room for further evaluation.  She has also been noticing some bright red blood from either vaginal or rectal bleeding.  She has not completely sure which area or possibly both areas this is coming from.  Was found to have severe thrombocytopenia on lab testing.    Severe thrombocytopenia, rule out ITP  -Platelets found to be 2.  -s/p platelets but improved only to 6. Give 2 additional platelets today  -await hematology eval, may end up needing steroids/IVIG  -unclear if PPI/cirrhosis/coumadin are contributing  -hold PPI and coumadin for now    Diabetes mellitus type 2.  -Aspart insulin sliding scale as needed.  -Hold glipizide.    Cirrhosis.  -Follows with [facility].    Sleep apnea.  -Wear CPAP while sleeping.    Atrial fibrillation.  Drug-induced coagulation defect.  Coronary artery disease.  Chronic heart failure with preserved ejection fraction.  -Hold warfarin.  -reports increased LE edema, resume home lasix    GERD/esophagitis  -had recent EGD, was on protonix but holding due to thrombocytopenia    Chronic kidney disease stage IIIa.  -Creatinine appears to be at baseline.  -resume lasix        Diet: Combination Diet Regular Diet Adult    DVT Prophylaxis: Pneumatic Compression Devices  Aguilar Catheter: Not present  Lines: None     Cardiac Monitoring: None  Code Status: No CPR- Pre-arrest intubation OK         Disposition Plan     Medically Ready for Discharge:  2-3         Marc Abdul DO  Hospitalist Service  [facility]  Securely message with [employer] (more info)  Text page via [employer] Paging/Directory   ______________________________________________________________________    Interval History   Still with significant bruising and reports some LE edema, home lasix not resumed yet.     Physical Exam   Vital Signs: Temp: 98  F (36.7  C) Temp src: Oral BP: (!) 158/66 Pulse: 60   Resp: 18 SpO2: 95 % O2 Device: None (Room air)    Weight: 248 lbs 6.4 oz    Constitutional: awake, alert, and cooperative  Eyes: pupils equal, round and reactive to light and conjunctiva normal  ENT: normocephalic, without obvious abnormality, atraumatic  Respiratory: no increased work of breathing, good air exchange, and clear to auscultation  Cardiovascular: regular rate and rhythm and no murmur noted  GI: normal bowel sounds, soft, and non-distended  Skin: scattered bruising on extremities and worse on arms, no rashes  Neurologic: alert, interactive, no focal deficits      45 MINUTES SPENT BY ME on the date of service doing chart review, history, exam, documentation & further activities per the note.      Data   ------------------------- PAST 24 HR DATA REVIEWED -----------------------------------------------    I have personally reviewed the following data over the past 24 hrs:    4.0  \   11.2 (L)   / 6 (LL)     136 99 16.6 /  132 (H)   4.3 25 0.95 \     ALT: 30 AST: 46 (H) AP: 113 TBILI: 1.9 (H)   ALB: 3.4 (L) TOT PROTEIN: 6.6 LIPASE: 8 (L)     INR:  2.04 (H) PTT:  N/A   D-dimer:  N/A Fibrinogen:  N/A       Imaging results reviewed over the past 24 hrs:   Recent Results (from the past 24 hour(s))   CT Abdomen Pelvis w Contrast    Narrative    EXAM: CT ABDOMEN PELVIS W CONTRAST  LOCATION: [facility]  DATE: 8/30/2024    INDICATION: Cirrhosis, rectal bleeding, thrombocytopenia; eval mass.  COMPARISON: 4/1/2024.  TECHNIQUE: CT scan of the abdomen and  pelvis was performed following injection of IV contrast. Multiplanar reformats were obtained. Dose reduction techniques were used.  CONTRAST: 100 mL Isovue-370.    FINDINGS:   LOWER CHEST: Pacemaker. Mild interstitial infiltrate within the right lower lobe with a few regions of groundglass nodularity.    HEPATOBILIARY: Cirrhosis. Cholecystectomy.    PANCREAS: Partial fatty replacement.    SPLEEN: Splenomegaly.    ADRENAL GLANDS: Normal.    KIDNEYS/BLADDER: Normal.    BOWEL: Multiple sigmoid diverticula are present. Again seen is the lobulated cystic region adjacent to the lateral wall of the mid sigmoid unchanged. This could be a small contained abscess although there is no additional evidence for acute   diverticulitis on today's study. There is a small amount of stranding adjacent to the proximal ascending colon. Postsurgical change at the GE junction.    LYMPH NODES: Normal.    VASCULATURE: Normal.    PELVIC ORGANS: Hysterectomy.    MUSCULOSKELETAL: Multilevel lumbar degenerative change.      Impression    IMPRESSION:   1.  Cirrhosis with splenomegaly. No ascites.    2.  Multiple sigmoid diverticula without acute diverticulitis. Again seen is a small lobulated cystic mass along the left lateral aspect of the sigmoid. This could be contained abscess but is unchanged measuring approximately 2.5 x 2.7 cm.    3.  New small amount of stranding is seen along the lateral aspect of the proximal ascending colon possibly minimal diverticulitis.    4.  Cholecystectomy and hysterectomy with postsurgical change at the GE junction.

## 2024-08-31 NOTE — PLAN OF CARE
"BP (!) 147/61 (BP Location: Left arm, Patient Position: Sitting, Cuff Size: Adult Regular)   Pulse 81   Temp 98  F (36.7  C) (Oral)   Resp 16   Ht 1.727 m (5' 8\")   Wt 112.7 kg (248 lb 6.4 oz)   LMP  (LMP Unknown)   SpO2 92%   BMI 37.77 kg/m      VS: VSS on RA.  Pain: Reports pain in LLQ of abdomen. PRN tylenol given w/ decrease in pain.   Lines: PIV SL to RUE.   Cognitive: Aox4.  Respiratory: LS clear. No SOB or ARDON reported.  Cardiac: Denies CP. Tele not placed as pt stated she was allergic to patches.   Peripheral neurovascular: Trace dependent edema. Denies numbness, tingling, or tenderness. Pulses 2+.  GI: Last BM . Denies N/V.  : Voiding spontaneously.   Skin: Petechiae all over body. See flowsheet for assessment.   Diet: Regular.  Activity: Sba w/ walker.   Labs:CBC in for AM draw.   Plan: Transfuse platelets < 10. Hem / onc consult in AM. Continue w/ POC.     Goal Outcome Evaluation:      Plan of Care Reviewed With: patient    Overall Patient Progress: improvingOverall Patient Progress: improving    Outcome Evaluation: Trace dependent edema. B.      Problem: Adult Inpatient Plan of Care  Goal: Plan of Care Review  Description: The Plan of Care Review/Shift note should be completed every shift.  The Outcome Evaluation is a brief statement about your assessment that the patient is improving, declining, or no change.  This information will be displayed automatically on your shift  note.  2024 by Dorothy Lara, RN  Outcome: Progressing  Flowsheets (Taken 2024)  Outcome Evaluation: Trace dependent edema. B.  Plan of Care Reviewed With: patient  Overall Patient Progress: improving  2024 by Dorothy Lara, RN  Outcome: Progressing  Goal: Patient-Specific Goal (Individualized)  Description: You can add care plan individualizations to a care plan. Examples of Individualization might be:  \"Parent requests to be called daily at 9am for status\", \"I have a hard " "time hearing out of my right ear\", or \"Do not touch me to wake me up as it startles  me\".  8/31/2024 0218 by Dorothy Lara RN  Outcome: Progressing  8/31/2024 0117 by Dorothy Lara RN  Outcome: Progressing  Goal: Absence of Hospital-Acquired Illness or Injury  8/31/2024 0218 by Dorothy Lara RN  Outcome: Progressing  8/31/2024 0117 by Dorothy Lara RN  Outcome: Progressing  Intervention: Identify and Manage Fall Risk  Recent Flowsheet Documentation  Taken 8/31/2024 0050 by Dorothy Lara RN  Safety Promotion/Fall Prevention: safety round/check completed  Intervention: Prevent Skin Injury  Recent Flowsheet Documentation  Taken 8/31/2024 0049 by Dorothy Lara RN  Body Position: position changed independently  Intervention: Prevent and Manage VTE (Venous Thromboembolism) Risk  Recent Flowsheet Documentation  Taken 8/31/2024 0050 by Dorothy Lara RN  VTE Prevention/Management: SCDs off (sequential compression devices)  Intervention: Prevent Infection  Recent Flowsheet Documentation  Taken 8/31/2024 0050 by Dorothy Lara RN  Infection Prevention:   single patient room provided   rest/sleep promoted   hand hygiene promoted   personal protective equipment utilized   equipment surfaces disinfected   environmental surveillance performed   cohorting utilized  Goal: Optimal Comfort and Wellbeing  8/31/2024 0218 by Dorothy Lara RN  Outcome: Progressing  8/31/2024 0117 by Dorothy Lara RN  Outcome: Progressing  Intervention: Monitor Pain and Promote Comfort  Recent Flowsheet Documentation  Taken 8/31/2024 0151 by Dorothy Lara RN  Pain Management Interventions: medication (see MAR)  Taken 8/31/2024 0132 by Dorothy Lara RN  Pain Management Interventions: (Pt has no PRNs and MD paged.) MD notified (comment)  Goal: Readiness for Transition of Care  8/31/2024 0218 by Dorothy Lara RN  Outcome: Progressing  8/31/2024 0117 by Dorothy Lara RN  Outcome: Progressing  Intervention: Mutually " Develop Transition Plan  Recent Flowsheet Documentation  Taken 8/31/2024 0040 by Dorothy Lara, CL[place]  Equipment Currently Used at Home:   wheelchair, power   walker, rolling   grab bar, toilet   grab bar, tub/shower     Problem: Fluid Volume Deficit  Goal: Fluid Balance  8/31/2024 0218 by Dorothy Lara, RN  Outcome: Progressing  8/31/2024 0117 by Dorothy Lara RN  Outcome: Progressing     Problem: Comorbidity Management  Goal: Maintenance of Asthma Control  8/31/2024 0218 by Dorothy Lara RN  Outcome: Adequate for Care Transition  8/31/2024 0117 by Dorothy Lara RN  Outcome: Progressing  Intervention: Maintain Asthma Symptom Control  Recent Flowsheet Documentation  Taken 8/31/2024 0050 by Dorothy Lara RN  Medication Review/Management: medications reviewed  Goal: Maintenance of Heart Failure Symptom Control  8/31/2024 0218 by Dorothy Lara, RN  Outcome: Progressing  8/31/2024 0117 by Dorothy Lara RN  Outcome: Progressing  Intervention: Maintain Heart Failure Management  Recent Flowsheet Documentation  Taken 8/31/2024 0050 by Dorothy Lara RN  Medication Review/Management: medications reviewed  Goal: Bariatric Home Regimen Maintained  8/31/2024 0218 by Dorothy Lara, RN  Outcome: Adequate for Care Transition  8/31/2024 0117 by Dorothy Lara RN  Outcome: Progressing  Intervention: Maintain and Manage Postbariatric Surgery Care  Recent Flowsheet Documentation  Taken 8/31/2024 0050 by Dorothy Lara, RN  Medication Review/Management: medications reviewed  Goal: Blood Glucose Levels Within Targeted Range  8/31/2024 0218 by Dorothy Lara, RN  Outcome: Progressing  8/31/2024 0117 by Dorothy Lara RN  Outcome: Progressing  Intervention: Monitor and Manage Glycemia  Recent Flowsheet Documentation  Taken 8/31/2024 0050 by Dorothy Lara, RN  Medication Review/Management: medications reviewed

## 2024-08-31 NOTE — CONSULTS
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  Atrial fibrillation on chronic anticoagulation.  Cirrhosis.  Congestive heart failure.  Coronary artery disease.  Diabetes mellitus type 2.  Fibromyalgia.  GERD.  Gout.  Sleep apnea.    Presumed immune mediated thrombocytopenia given abrupt drop over the last month.  Precipitating event is unclear outside of recent initiation of Protonix; replaced Protonix with Zantac.  No signs of prior lymphoproliferative disorder on recent imaging.  Start IVIG 1 g/kg today and tomorrow; reviewed logistics of infusion as well as potential side effects; consent signed.  Start dexamethasone 40 mg daily x 4 days.  Would ask hospitalist to assist with hyperglycemia as a result of steroid-induced diabetes.  Would ask hospitalist to assist with volume overload as a result of IVIG infusion.  Hold Coumadin until platelets are above 50,000 consistently.  Daily CBC.    Anupam Shelton MD    --------------------    Interval History:  Savanna presents for consultation regarding thrombocytopenia.  She was in her usual state of health when she began developing worsening petechiae on the lower extremities followed by worsening bruises on the upper extremities.  She does have known cirrhosis with platelets running in the low 100,000 range.  No recent vaccinations.  No recent colds.  The only medication she points out that she started was Protonix.  Platelets identified less than 10,000.  No other bleeding or bruising outside of cutaneous changes.  No family history of leukemia or lymphoma or other autoimmune cytopenias.    --------------------    Review of Systems:  10 point ROS negative except for that above.    Past Medical / Surgical History:  Past Medical History:   Diagnosis Date    Acute encephalopathy 2023    Anemia     Iron Deficiency  anemia    Atrial fibrillation (H)     CAD (coronary artery disease)     non-obstructive    Chronic pain     neck, low back, legs    Congestive heart failure (H)     Degenerative disk disease     Diabetes (H)     Fibromyalgia     Gastro-oesophageal reflux disease     Gout     Hiatal hernia     Mumps     Neuropathy     CRISTIANA (obstructive sleep apnea) - CPAP     Palpitations     Pernicious anemia     Sleep apnea     uses CPAP.    Urinary incontinence     Vitamin D deficiency      Past Surgical History:   Procedure Laterality Date    APPENDECTOMY      CHOLECYSTECTOMY      COLONOSCOPY  3/15/2011    COLONOSCOPY N/A 3/15/2023    Procedure: COLONOSCOPY, WITH POLYPECTOMY AND BIOPSY;  Surgeon: Maci Coronel MD;  Location:  GI    COLONOSCOPY N/A 3/15/2023    Procedure: COLONOSCOPY, FLEXIBLE, WITH LESION REMOVAL USING SNARE;  Surgeon: Maci Coronel MD;  Location:  GI    CORONARY ANGIOGRAPHY ADULT ORDER      CYSTOSCOPY, BIOPSY BLADDER, COMBINED N/A 7/13/2020    Procedure: CYSTOSCOPY, WITH BLADDER BIOPSY;  Surgeon: Deisy Wilson MD;  Location: [facility] OR    EP PACEMAKER DEVICE & LEAD IMPLANT- RIGHT ATRIAL & RIGHT VENTRICULAR Left 4/5/2024    Procedure: Pacemaker Device & Lead Implant - Right Atrial & Right Ventricular;  Surgeon: Raul Plunkett MD;  Location:  HEART CARDIAC CATH LAB    ESOPHAGOSCOPY, GASTROSCOPY, DUODENOSCOPY (EGD), COMBINED N/A 8/12/2024    Procedure: Esophagogastroduodenoscopy;  Surgeon: Mohan Mcguire MD;  Location:  OR    HEART CATH LEFT HEART CATH  12/30/16    medication management    HYSTERECTOMY TOTAL ABDOMINAL      Knee replacement NOS Left     LAPAROSCOPIC NISSEN FUNDOPLICATION N/A 2/4/2015    Procedure: LAPAROSCOPIC NISSEN FUNDOPLICATION;  Surgeon: Armando Ansari MD;  Location:  OR    TONSILLECTOMY      TRANSPOSITION ULNAR NERVE (ELBOW)       Patient Active Problem List   Diagnosis    Angioedema    CRISTIANA (obstructive sleep apnea)     Recurrent UTI    Urgency-frequency syndrome     Urgency incontinence    Candidiasis of skin    Chronic atrial fibrillation (H)    Permanent atrial fibrillation (H)    Hyperlipidemia LDL goal <100    Vitamin D deficiency    Type 2 diabetes mellitus with stage 3a chronic kidney disease, without long-term current use of insulin (H)    Ascending aorta dilatation (H24)    Nonrheumatic tricuspid valve regurgitation    Anemia    Gastroesophageal reflux disease    Gout    Chronic kidney disease, stage 3    C. difficile colitis, recurrent -- she needs to take Vanco 125 bid anytime she is on a different Abx     Bony pelvic pain    Adjustment disorder with anxiety    Adjustment disorder with mixed anxiety and depressed mood    Abnormal feces    Diverticular disease of colon    Iron deficiency anemia    Loose stools    Lower abdominal pain    Noninfectious gastroenteritis    Elevated troponin    Anemia due to blood loss, acute    Subtherapeutic international normalized ratio (INR)    Traumatic hematoma of buttock, subsequent encounter    Chest pain, unspecified type    Repeated falls    Right-sided chest wall pain    Contusion of right thigh, subsequent encounter    Dizziness and giddiness    Physical deconditioning    Acute posthemorrhagic anemia    Obstructive sleep apnea (adult) (pediatric)    Gastro-esophageal reflux disease without esophagitis    History of Clostridium difficile colitis    Contusion of lower back and pelvis, subsequent encounter    Muscle weakness (generalized)    Atrial fibrillation, unspecified type (H)    Long term current use of anticoagulant therapy    Hypertensive heart disease with chronic diastolic congestive heart failure (H)    Skin yeast infection    Abnormal liver enzymes    Cirrhosis of liver (H)    Confusion associated with infection    Acute encephalopathy    Diarrhea    Hypervolemia, unspecified hypervolemia type    Congestive heart failure, unspecified HF chronicity, unspecified heart failure type (H)    Diverticulitis    Colonic  "diverticular abscess    Hematoma of buttock    Atrial fibrillation with slow ventricular response (H)    Diverticulitis of large intestine with abscess, unspecified bleeding status    S/P placement of cardiac pacemaker: 4/5/2024    Anticoagulated on Coumadin    Cognitive impairment    Adjustment disorder with anxious mood    Abnormal WBC count    Chronic diarrhea    Urinary incontinence, unspecified type    Thrombocytopenia (H24)    Rash and nonspecific skin eruption    Major depression, recurrent (H)    Class 2 severe obesity due to excess calories with serious comorbidity in adult (H)       Family History:  Family History   Problem Relation Age of Onset    Alzheimer Disease Mother     Lung Cancer Father     No Known Problems Brother     No Known Problems Brother     No Known Problems Brother     Unknown/Adopted No family hx of        Social History:  Social History     Tobacco Use    Smoking status: Former     Current packs/day: 0.00     Average packs/day: 0.5 packs/day for 50.0 years (25.0 ttl pk-yrs)     Types: Cigarettes     Start date: 9/1/1964     Quit date: 9/1/2014     Years since quitting: 10.0     Passive exposure: Past    Smokeless tobacco: Never   Vaping Use    Vaping status: Never Used   Substance Use Topics    Alcohol use: Not Currently    Drug use: Never       Medications / Allergies:  Reviewed in EMR.    --------------------    Physical Exam:  VS: BP (!) 160/68   Pulse 61   Temp 97.9  F (36.6  C) (Oral)   Resp 18   Ht 1.727 m (5' 8\")   Wt 112.7 kg (248 lb 6.4 oz)   LMP  (LMP Unknown)   SpO2 97%   BMI 37.77 kg/m    GEN: Well appearing.  SKIN: Scattered ecchymoses and petechiae; no wet purpura in mouth.    Labs / Imaging / Path:  Reviewed CBC, CMP.  Reviewed CT CAP w/ independent review.    "

## 2024-08-31 NOTE — ED NOTES
Pt reported she has increased petechia over BL hands, SOB with some mild wheezing heard over left upper chest. Pt reported feeling worse then when first arrived. Blood transfusion stopped. Dr. Tello came for in person for reassessment. Pt safe to restart on platelet transfusion with lower rate of 75ml/hr

## 2024-08-31 NOTE — H&P
[facility]    History and Physical - Hospitalist Service       Date of Admission:  8/30/2024    Assessment & Plan      Savanna Rehman is a 83 year old female admitted on 8/30/2024. She has a past medical history significant for atrial fibrillation, cirrhosis, congestive heart failure, coronary artery disease, diabetes mellitus type 2, fibromyalgia, GERD, gout, and sleep apnea.  She began noticing very small little red spots on her arms, legs, and abdomen starting 2 days ago.  Seems to be getting more of the spots.  Presented to emergency room for further evaluation.  She has also been noticing some bright red blood from either vaginal or rectal bleeding.  She has not completely sure which area or possibly both areas this is coming from.  Was found to have severe thrombocytopenia on lab testing.    Severe thrombocytopenia.  -Platelets found to be 2.  -Anemia platelet transfusion in the ER.  -Recheck CBC in the morning.  -Oncology consult.  -Suspect she may need methylprednisolone and/or IVIG.  -Hold warfarin.    Diabetes mellitus type 2.  -Aspart insulin sliding scale as needed.  -Hold glipizide.    Cirrhosis.  -Follows with [facility].    Sleep apnea.  -Wear CPAP while sleeping.    Atrial fibrillation.  Drug-induced coagulation defect.  Coronary artery disease.  Chronic heart failure with preserved ejection fraction.  -Hold warfarin.  -Does not appear to be significantly fluid overloaded.  -Hold furosemide initially.  -Try to avoid IV fluids as possible.    GERD.  -Pantoprazole 40 mg twice a day.    Chronic kidney disease stage IIIa.  -Creatinine appears to be at baseline.  -Avoid nephrotoxins as able.  -Recheck metabolic panel intermittently.          Diet:  Regular diet.  DVT Prophylaxis: Warfarin  Aguilar Catheter: Not present  Lines: None     Cardiac Monitoring: None  Code Status:  DNR.  Prearrest intubation would be okay.    Clinically Significant Risk Factors Present on Admission              "  # Drug Induced Coagulation Defect: home medication list includes an anticoagulant medication  # Thrombocytopenia: Lowest platelets = 2 in last 2 days, will monitor for bleeding    # Chronic heart failure with preserved ejection fraction: heart failure noted on problem list and last echo with EF >50%        # Obesity: Estimated body mass index is 37.71 kg/m  as calculated from the following:    Height as of this encounter: 1.727 m (5' 8\").    Weight as of this encounter: 112.5 kg (248 lb 0.3 oz).        # Pacemaker present             Disposition Plan     Medically Ready for Discharge: Anticipated in 2-4 Days           Randell Wakefield DO  Hospitalist Service  [facility]  Securely message with [employer] (more info)  Text page via [employer] Paging/Directory     ______________________________________________________________________    Chief Complaint   Bleeding.    History is obtained from the patient    History of Present Illness   Savanna Rehman is a 83 year old female who has a past medical history significant for atrial fibrillation, cirrhosis, congestive heart failure, coronary artery disease, diabetes mellitus type 2, fibromyalgia, GERD, gout, and sleep apnea.  She has been noticing small red spots on her arms, legs, and abdomen for the past 2 days.  Had also been having some blood in her underwear.  Seems to be getting more of these little red spots over the past 2 days.  Has been wearing a pad due to concerns for blood in her underwear.  She is not sure if the blood is vaginal or rectal bleeding.  Has not noticed any obvious blood in her stools.  No other acute bleeding.  No fevers or chills.  No shortness of breath.  No other acute complaints.      Past Medical History    Past Medical History:   Diagnosis Date    Acute encephalopathy 09/21/2023    Anemia     Iron Deficiency anemia    Atrial fibrillation (H)     CAD (coronary artery disease)     non-obstructive    Chronic pain     neck, " low back, legs    Congestive heart failure (H)     Degenerative disk disease     Diabetes (H)     Fibromyalgia     Gastro-oesophageal reflux disease     Gout     Hiatal hernia     Mumps     Neuropathy     CRISTIANA (obstructive sleep apnea) - CPAP     Palpitations     Pernicious anemia     Sleep apnea     uses CPAP.    Urinary incontinence     Vitamin D deficiency        Past Surgical History   Past Surgical History:   Procedure Laterality Date    APPENDECTOMY      CHOLECYSTECTOMY      COLONOSCOPY  3/15/2011    COLONOSCOPY N/A 3/15/2023    Procedure: COLONOSCOPY, WITH POLYPECTOMY AND BIOPSY;  Surgeon: Maci Coronel MD;  Location:  GI    COLONOSCOPY N/A 3/15/2023    Procedure: COLONOSCOPY, FLEXIBLE, WITH LESION REMOVAL USING SNARE;  Surgeon: Maci Coronel MD;  Location:  GI    CORONARY ANGIOGRAPHY ADULT ORDER      CYSTOSCOPY, BIOPSY BLADDER, COMBINED N/A 7/13/2020    Procedure: CYSTOSCOPY, WITH BLADDER BIOPSY;  Surgeon: Deisy Wilson MD;  Location: [facility] OR    EP PACEMAKER DEVICE & LEAD IMPLANT- RIGHT ATRIAL & RIGHT VENTRICULAR Left 4/5/2024    Procedure: Pacemaker Device & Lead Implant - Right Atrial & Right Ventricular;  Surgeon: Raul Plunkett MD;  Location:  HEART CARDIAC CATH LAB    ESOPHAGOSCOPY, GASTROSCOPY, DUODENOSCOPY (EGD), COMBINED N/A 8/12/2024    Procedure: Esophagogastroduodenoscopy;  Surgeon: Mohan Mcguire MD;  Location:  OR    HEART CATH LEFT HEART CATH  12/30/16    medication management    HYSTERECTOMY TOTAL ABDOMINAL      Knee replacement NOS Left     LAPAROSCOPIC NISSEN FUNDOPLICATION N/A 2/4/2015    Procedure: LAPAROSCOPIC NISSEN FUNDOPLICATION;  Surgeon: Armando Ansari MD;  Location:  OR    TONSILLECTOMY      TRANSPOSITION ULNAR NERVE (ELBOW)         Prior to Admission Medications   Prior to Admission Medications   Prescriptions Last Dose Informant Patient Reported? Taking?   Cholecalciferol (VITAMIN D-3) 125 MCG (5000 UT) TABS   No No   Sig: Take 5,000 Units by mouth  daily   Patient taking differently: Take 4,000 Units by mouth daily   EPINEPHrine (ANY BX GENERIC EQUIV) 0.3 MG/0.3ML injection 2-pack   No No   Sig: Inject 0.3 mLs (0.3 mg) into the muscle as needed for anaphylaxis May repeat one time in 5-15 minutes if response to initial dose is inadequate.   Warfarin Sodium (COUMADIN PO)   Yes No   Sig: Take by mouth daily   4/12/24: INR 1.6 today  Take coumadin 7.5 mg on 4/12 and 4/13 and then 5mg on 4/14 and 4/15 then check INR on 4/16 4/16/2024 INR 2.2 give 3.5mg coumadin daily and check INR on 4/19 4/19/2024:  INR IS 2.1  RESUME HOME REGIMEN OF COUMADIN  as listed   acetaminophen (TYLENOL) 500 MG tablet   Yes No   Sig: Take 1,000 mg by mouth every 6 hours as needed for mild pain   butalbital-aspirin-caffeine (FIORINAL) -40 MG capsule   No No   Sig: TAKE 1 CAPSULE BY MOUTH EVERY 4 HOURS AS NEEDED FOR HEADACHES OR  MIGRAINE   escitalopram (LEXAPRO) 10 MG tablet   No No   Sig: Take 1 tablet (10 mg) by mouth at bedtime   famotidine (PEPCID) 20 MG tablet   Yes No   Sig: Take 20 mg by mouth daily   furosemide (LASIX) 40 MG tablet   No No   Sig: Take 1 tablet (40 mg) by mouth daily for 90 days   Patient taking differently: Take 40 mg by mouth 2 times daily Twice daily and as needed   glipiZIDE (GLUCOTROL XL) 2.5 MG 24 hr tablet   No No   Sig: Take 1 tablet by mouth twice daily   indapamide (LOZOL) 2.5 MG tablet   No No   Sig: Take 1 tablet (2.5 mg) by mouth every morning   loperamide (IMODIUM) 2 MG capsule   No No   Sig: TAKE 1 CAPSULE BY MOUTH 4 TIMES DAILY AS NEEDED FOR DIARRHEA   melatonin 3 MG tablet   Yes No   Sig: Take 3 mg by mouth nightly as needed for sleep   Patient not taking: Reported on 8/2/2024   omeprazole (PRILOSEC) 20 MG DR capsule   No No   Sig: Take 1 capsule (20 mg) by mouth daily   warfarin ANTICOAGULANT (COUMADIN) 2.5 MG tablet   Yes No   Sig: Take 2.5 mg by mouth once a week On Monday   warfarin ANTICOAGULANT (COUMADIN) 2.5 MG tablet   Yes No   Sig:  Take 3.75 mg by mouth six times a week Daily except on Mondays      Facility-Administered Medications: None        Allergies   Allergies   Allergen Reactions    Augmented Betamethasone Diprop [Betamethasone] Other (See Comments)     Severe yeast infection    Petrolatum Anaphylaxis and Swelling     Rash and swelling    Shellfish-Derived Products Anaphylaxis     Tongue swelling    Aspirin Swelling     tongue swelling    Bacitracin      Rash swelling    Bactrim [Sulfamethoxazole-Trimethoprim] Dizziness    Darvon [Propoxyphene] Swelling     Throat closes    Dilaudid [Hydromorphone]      No side effects from fentanyl June 2023    Levaquin [Levofloxacin] Swelling     Tongue swelling    Lidocaine      Facial swelling     Morphine Unknown     [facility]    Neomycin Swelling     rash    Neosporin [Neomycin-Polymyxin-Gramicidin] Swelling     rash    Nitrofurantoin      SOB, GI upset,    Oxycodone      Severe itching    Percocet [Oxycodone-Acetaminophen] Unknown    Percodan [Oxycodone-Aspirin]      Severe itching    Polymyxin B     Pramoxine     Tramadol     Vicodin [Hydrocodone-Acetaminophen]      Severe itching      Xarelto [Rivaroxaban]     Adhesive Tape Rash     Band aids     Codeine Rash    Hydrocortisone Rash and Swelling    Other Environmental Allergy Rash     Adhesive tape   Band aids         Physical Exam   Vital Signs: Temp: 98  F (36.7  C) Temp src: Oral BP: (!) 169/79 Pulse: 60   Resp: 15 SpO2: 97 % O2 Device: None (Room air)    Weight: 248 lbs .28 oz    Gen:  NAD, A&Ox3.  Eyes:  PERRL, sclera anicteric.  OP:  MMM, no lesions.  Neck:  Supple.  CV:  Mildly irregular, no loud murmurs.  Lung:  CTA b/l, normal effort.  Ab:  +BS, soft.  Skin:  Warm, dry to touch.  No rash.  Ext:  Mild non pitting edema LE b/l.      Medical Decision Making       80 MINUTES SPENT BY ME on the date of service doing chart review, history, exam, documentation & further activities per the note.      Data     I have  personally reviewed the following data over the past 24 hrs:    5.3  \   12.7   / 2 (LL)     140 102 18.0 /  122 (H)   4.7 26 1.04 (H) \     ALT: 34 AST: 40 AP: 135 TBILI: 1.2   ALB: 3.8 TOT PROTEIN: 7.7 LIPASE: 8 (L)     INR:  2.04 (H) PTT:  N/A   D-dimer:  N/A Fibrinogen:  N/A       Imaging results reviewed over the past 24 hrs:   No results found for this or any previous visit (from the past 24 hour(s)).

## 2024-08-31 NOTE — PROVIDER NOTIFICATION
MD paged: Pt reports 5/10 pain in left abdomen. Can we please get a PRN order? Thanks.     Order placed.

## 2024-09-01 LAB
ANION GAP SERPL CALCULATED.3IONS-SCNC: 14 MMOL/L (ref 7–15)
APTT PPP: 31 SECONDS (ref 22–38)
BASOPHILS # BLD AUTO: 0 10E3/UL (ref 0–0.2)
BASOPHILS NFR BLD AUTO: 0 %
BUN SERPL-MCNC: 23.4 MG/DL (ref 8–23)
CALCIUM SERPL-MCNC: 9.4 MG/DL (ref 8.8–10.4)
CHLORIDE SERPL-SCNC: 93 MMOL/L (ref 98–107)
CREAT SERPL-MCNC: 1.03 MG/DL (ref 0.51–0.95)
EGFRCR SERPLBLD CKD-EPI 2021: 54 ML/MIN/1.73M2
EOSINOPHIL # BLD AUTO: 0 10E3/UL (ref 0–0.7)
EOSINOPHIL NFR BLD AUTO: 0 %
ERYTHROCYTE [DISTWIDTH] IN BLOOD BY AUTOMATED COUNT: 13.1 % (ref 10–15)
ERYTHROCYTE [DISTWIDTH] IN BLOOD BY AUTOMATED COUNT: 13.2 % (ref 10–15)
FIBRINOGEN PPP-MCNC: 377 MG/DL (ref 170–510)
GLUCOSE BLDC GLUCOMTR-MCNC: 246 MG/DL (ref 70–99)
GLUCOSE BLDC GLUCOMTR-MCNC: 263 MG/DL (ref 70–99)
GLUCOSE BLDC GLUCOMTR-MCNC: 274 MG/DL (ref 70–99)
GLUCOSE BLDC GLUCOMTR-MCNC: 293 MG/DL (ref 70–99)
GLUCOSE BLDC GLUCOMTR-MCNC: 295 MG/DL (ref 70–99)
GLUCOSE BLDC GLUCOMTR-MCNC: 360 MG/DL (ref 70–99)
GLUCOSE SERPL-MCNC: 245 MG/DL (ref 70–99)
HBV CORE AB SERPL QL IA: REACTIVE
HBV SURFACE AB SERPL IA-ACNC: >1000 M[IU]/ML
HBV SURFACE AB SERPL IA-ACNC: REACTIVE M[IU]/ML
HBV SURFACE AG SERPL QL IA: NONREACTIVE
HCO3 SERPL-SCNC: 27 MMOL/L (ref 22–29)
HCT VFR BLD AUTO: 36.2 % (ref 35–47)
HCT VFR BLD AUTO: 37.1 % (ref 35–47)
HGB BLD-MCNC: 12.5 G/DL (ref 11.7–15.7)
HGB BLD-MCNC: 12.8 G/DL (ref 11.7–15.7)
HOLD SPECIMEN: NORMAL
IMM GRANULOCYTES # BLD: 0.1 10E3/UL
IMM GRANULOCYTES NFR BLD: 2 %
INR PPP: 1.41 (ref 0.85–1.15)
LYMPHOCYTES # BLD AUTO: 0.6 10E3/UL (ref 0.8–5.3)
LYMPHOCYTES NFR BLD AUTO: 20 %
MCH RBC QN AUTO: 30.6 PG (ref 26.5–33)
MCH RBC QN AUTO: 30.7 PG (ref 26.5–33)
MCHC RBC AUTO-ENTMCNC: 34.5 G/DL (ref 31.5–36.5)
MCHC RBC AUTO-ENTMCNC: 34.5 G/DL (ref 31.5–36.5)
MCV RBC AUTO: 89 FL (ref 78–100)
MCV RBC AUTO: 89 FL (ref 78–100)
MONOCYTES # BLD AUTO: 0.1 10E3/UL (ref 0–1.3)
MONOCYTES NFR BLD AUTO: 2 %
NEUTROPHILS # BLD AUTO: 2.3 10E3/UL (ref 1.6–8.3)
NEUTROPHILS NFR BLD AUTO: 75 %
NRBC # BLD AUTO: 0 10E3/UL
NRBC BLD AUTO-RTO: 0 /100
PLAT MORPH BLD: NORMAL
PLATELET # BLD AUTO: 17 10E3/UL (ref 150–450)
PLATELET # BLD AUTO: 20 10E3/UL (ref 150–450)
POTASSIUM SERPL-SCNC: 4 MMOL/L (ref 3.4–5.3)
RBC # BLD AUTO: 4.08 10E6/UL (ref 3.8–5.2)
RBC # BLD AUTO: 4.17 10E6/UL (ref 3.8–5.2)
RBC MORPH BLD: NORMAL
RETICS # AUTO: 0.1 10E6/UL (ref 0.03–0.1)
RETICS/RBC NFR AUTO: 2.4 % (ref 0.5–2)
SODIUM SERPL-SCNC: 134 MMOL/L (ref 135–145)
WBC # BLD AUTO: 3.1 10E3/UL (ref 4–11)
WBC # BLD AUTO: 3.6 10E3/UL (ref 4–11)

## 2024-09-01 PROCEDURE — 86706 HEP B SURFACE ANTIBODY: CPT | Performed by: INTERNAL MEDICINE

## 2024-09-01 PROCEDURE — 85730 THROMBOPLASTIN TIME PARTIAL: CPT | Performed by: INTERNAL MEDICINE

## 2024-09-01 PROCEDURE — 85045 AUTOMATED RETICULOCYTE COUNT: CPT | Performed by: INTERNAL MEDICINE

## 2024-09-01 PROCEDURE — 85384 FIBRINOGEN ACTIVITY: CPT | Performed by: INTERNAL MEDICINE

## 2024-09-01 PROCEDURE — 250N000011 HC RX IP 250 OP 636: Mod: JZ | Performed by: INTERNAL MEDICINE

## 2024-09-01 PROCEDURE — 120N000001 HC R&B MED SURG/OB

## 2024-09-01 PROCEDURE — 250N000013 HC RX MED GY IP 250 OP 250 PS 637: Performed by: INTERNAL MEDICINE

## 2024-09-01 PROCEDURE — 250N000012 HC RX MED GY IP 250 OP 636 PS 637: Performed by: INTERNAL MEDICINE

## 2024-09-01 PROCEDURE — 250N000012 HC RX MED GY IP 250 OP 636 PS 637: Performed by: HOSPITALIST

## 2024-09-01 PROCEDURE — 99232 SBSQ HOSP IP/OBS MODERATE 35: CPT | Performed by: HOSPITALIST

## 2024-09-01 PROCEDURE — 99233 SBSQ HOSP IP/OBS HIGH 50: CPT | Performed by: INTERNAL MEDICINE

## 2024-09-01 PROCEDURE — 87340 HEPATITIS B SURFACE AG IA: CPT | Performed by: INTERNAL MEDICINE

## 2024-09-01 PROCEDURE — 250N000013 HC RX MED GY IP 250 OP 250 PS 637: Performed by: HOSPITALIST

## 2024-09-01 PROCEDURE — 85610 PROTHROMBIN TIME: CPT | Performed by: INTERNAL MEDICINE

## 2024-09-01 PROCEDURE — 85025 COMPLETE CBC W/AUTO DIFF WBC: CPT | Performed by: INTERNAL MEDICINE

## 2024-09-01 PROCEDURE — 36415 COLL VENOUS BLD VENIPUNCTURE: CPT | Performed by: INTERNAL MEDICINE

## 2024-09-01 PROCEDURE — 80048 BASIC METABOLIC PNL TOTAL CA: CPT | Performed by: HOSPITALIST

## 2024-09-01 PROCEDURE — 250N000011 HC RX IP 250 OP 636: Performed by: HOSPITALIST

## 2024-09-01 PROCEDURE — 85027 COMPLETE CBC AUTOMATED: CPT | Performed by: INTERNAL MEDICINE

## 2024-09-01 PROCEDURE — 86704 HEP B CORE ANTIBODY TOTAL: CPT | Performed by: INTERNAL MEDICINE

## 2024-09-01 RX ORDER — NICOTINE POLACRILEX 4 MG
15-30 LOZENGE BUCCAL
Status: DISCONTINUED | OUTPATIENT
Start: 2024-09-01 | End: 2024-09-02

## 2024-09-01 RX ORDER — DEXTROSE MONOHYDRATE 25 G/50ML
25-50 INJECTION, SOLUTION INTRAVENOUS
Status: DISCONTINUED | OUTPATIENT
Start: 2024-09-01 | End: 2024-09-02

## 2024-09-01 RX ORDER — HYDRALAZINE HYDROCHLORIDE 20 MG/ML
10 INJECTION INTRAMUSCULAR; INTRAVENOUS EVERY 6 HOURS PRN
Status: DISCONTINUED | OUTPATIENT
Start: 2024-09-01 | End: 2024-09-07 | Stop reason: HOSPADM

## 2024-09-01 RX ADMIN — GLIPIZIDE 2.5 MG: 2.5 TABLET, FILM COATED, EXTENDED RELEASE ORAL at 17:17

## 2024-09-01 RX ADMIN — LOPERAMIDE HYDROCHLORIDE 2 MG: 2 CAPSULE ORAL at 09:05

## 2024-09-01 RX ADMIN — HYDRALAZINE HYDROCHLORIDE 10 MG: 20 INJECTION INTRAMUSCULAR; INTRAVENOUS at 18:18

## 2024-09-01 RX ADMIN — HYDRALAZINE HYDROCHLORIDE 10 MG: 20 INJECTION INTRAMUSCULAR; INTRAVENOUS at 11:42

## 2024-09-01 RX ADMIN — GLIPIZIDE 2.5 MG: 2.5 TABLET, FILM COATED, EXTENDED RELEASE ORAL at 08:55

## 2024-09-01 RX ADMIN — Medication 1 MG: at 21:52

## 2024-09-01 RX ADMIN — INSULIN GLARGINE 15 UNITS: 100 INJECTION, SOLUTION SUBCUTANEOUS at 08:56

## 2024-09-01 RX ADMIN — FUROSEMIDE 40 MG: 40 TABLET ORAL at 08:55

## 2024-09-01 RX ADMIN — ACETAMINOPHEN 325MG 650 MG: 325 TABLET ORAL at 08:55

## 2024-09-01 RX ADMIN — FAMOTIDINE 10 MG: 10 TABLET, FILM COATED ORAL at 08:55

## 2024-09-01 RX ADMIN — HUMAN IMMUNOGLOBULIN G 63.9 G: 40 LIQUID INTRAVENOUS at 13:51

## 2024-09-01 RX ADMIN — FAMOTIDINE 10 MG: 10 TABLET, FILM COATED ORAL at 21:40

## 2024-09-01 RX ADMIN — DEXAMETHASONE 40 MG: 4 TABLET ORAL at 08:55

## 2024-09-01 RX ADMIN — ACETAMINOPHEN 325MG 650 MG: 325 TABLET ORAL at 12:57

## 2024-09-01 RX ADMIN — ESCITALOPRAM OXALATE 10 MG: 10 TABLET ORAL at 21:40

## 2024-09-01 RX ADMIN — DIPHENHYDRAMINE HYDROCHLORIDE 50 MG: 25 CAPSULE ORAL at 12:57

## 2024-09-01 ASSESSMENT — ACTIVITIES OF DAILY LIVING (ADL)
ADLS_ACUITY_SCORE: 39
ADLS_ACUITY_SCORE: 39
ADLS_ACUITY_SCORE: 43
ADLS_ACUITY_SCORE: 39
ADLS_ACUITY_SCORE: 43
ADLS_ACUITY_SCORE: 39
ADLS_ACUITY_SCORE: 43
DEPENDENT_IADLS:: CLEANING;MEDICATION MANAGEMENT;TRANSPORTATION
ADLS_ACUITY_SCORE: 43
ADLS_ACUITY_SCORE: 39
ADLS_ACUITY_SCORE: 43
ADLS_ACUITY_SCORE: 39
ADLS_ACUITY_SCORE: 39
ADLS_ACUITY_SCORE: 43
ADLS_ACUITY_SCORE: 39
ADLS_ACUITY_SCORE: 43
ADLS_ACUITY_SCORE: 39

## 2024-09-01 NOTE — PROGRESS NOTES
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  Atrial fibrillation on chronic anticoagulation.  Cirrhosis.  Congestive heart failure.  Coronary artery disease.  Diabetes mellitus type 2.  Fibromyalgia.  GERD.  Gout.  Sleep apnea.    Presumed immune mediated thrombocytopenia given abrupt drop over the last month.  Precipitating event is unclear outside of recent initiation of Protonix; replaced Protonix with Zantac.  No signs of prior lymphoproliferative disorder on recent imaging.  Continue IVIG 1 g/kg day 2 today; tolerated day 1 w/o issue.  Continue dexamethasone 40 mg daily day 2 of 4 planned.  Appreciate hospitalist care hyperglycemia and volume w/ treatments above.  Hold Coumadin until platelets are above 50,000 consistently.  Daily CBC.    Anupam Shelton MD    --------------------    Interval History:  Savanna presents for follow-up of ITP.  No bleeding.  Tolerating treatment w/o issue.  No new symptoms to report.  Looking forward to seeing her .    --------------------    Family History:  Family History   Problem Relation Age of Onset    Alzheimer Disease Mother     Lung Cancer Father     No Known Problems Brother     No Known Problems Brother     No Known Problems Brother     Unknown/Adopted No family hx of        Social History:  Social History     Tobacco Use    Smoking status: Former     Current packs/day: 0.00     Average packs/day: 0.5 packs/day for 50.0 years (25.0 ttl pk-yrs)     Types: Cigarettes     Start date: 1964     Quit date: 2014     Years since quitting: 10.0     Passive exposure: Past    Smokeless tobacco: Never   Vaping Use    Vaping status: Never Used   Substance Use Topics    Alcohol use: Not Currently    Drug use: Never       Medications / Allergies:  Reviewed in EMR.    --------------------    Physical Exam:  VS: BP  "(!) 175/91 (BP Location: Left arm, Patient Position: Sitting)   Pulse 63   Temp 98.5  F (36.9  C) (Oral)   Resp 16   Ht 1.727 m (5' 8\")   Wt 106.9 kg (235 lb 11.2 oz)   LMP  (LMP Unknown)   SpO2 95%   BMI 35.84 kg/m    GEN: Well appearing.    Labs / Imaging / Path:  Reviewed CBC.  "

## 2024-09-01 NOTE — PLAN OF CARE
"A/O4. Forgetful. Up SBA w walker. Headache, Prn tylenol given. IVIG infusing, pre meds given before. Purewick in place, changed. , 263, 360. Platelets improved to 20. Ambulated halls x1. PRN hydralazine given x2. Bed alarm on.     Temp: 97.6  F (36.4  C) Temp src: Oral BP: (!) 170/80 Pulse: 64   Resp: 16 SpO2: 96 % O2 Device: None (Room air)     Goal Outcome Evaluation:      Plan of Care Reviewed With: patient    Overall Patient Progress: improvingOverall Patient Progress: improving    Outcome Evaluation: Platelets increased to 20k. IVIG infusing.    Problem: Adult Inpatient Plan of Care  Goal: Plan of Care Review  Description: The Plan of Care Review/Shift note should be completed every shift.  The Outcome Evaluation is a brief statement about your assessment that the patient is improving, declining, or no change.  This information will be displayed automatically on your shift  note.  Outcome: Progressing  Flowsheets (Taken 9/1/2024 1712)  Outcome Evaluation: Platelets increased to 20k. IVIG infusing.  Plan of Care Reviewed With: patient  Overall Patient Progress: improving  Goal: Patient-Specific Goal (Individualized)  Description: You can add care plan individualizations to a care plan. Examples of Individualization might be:  \"Parent requests to be called daily at 9am for status\", \"I have a hard time hearing out of my right ear\", or \"Do not touch me to wake me up as it startles  me\".  Outcome: Progressing  Goal: Absence of Hospital-Acquired Illness or Injury  Outcome: Progressing  Intervention: Identify and Manage Fall Risk  Recent Flowsheet Documentation  Taken 9/1/2024 1637 by Lynsey Villagran, RN  Safety Promotion/Fall Prevention: safety round/check completed  Taken 9/1/2024 0900 by Lynsey Villagran, RN  Safety Promotion/Fall Prevention: safety round/check completed  Intervention: Prevent Skin Injury  Recent Flowsheet Documentation  Taken 9/1/2024 0900 by Lynsey Villagran, RN  Body Position: position " changed independently  Intervention: Prevent and Manage VTE (Venous Thromboembolism) Risk  Recent Flowsheet Documentation  Taken 9/1/2024 0900 by Lynsey Villagran RN  VTE Prevention/Management: SCDs off (sequential compression devices)  Intervention: Prevent Infection  Recent Flowsheet Documentation  Taken 9/1/2024 1637 by Lynsey Villagran, RN  Infection Prevention:   single patient room provided   rest/sleep promoted   personal protective equipment utilized   hand hygiene promoted   equipment surfaces disinfected   environmental surveillance performed   cohorting utilized  Goal: Optimal Comfort and Wellbeing  Outcome: Progressing  Intervention: Monitor Pain and Promote Comfort  Recent Flowsheet Documentation  Taken 9/1/2024 0852 by Lynsey Villagran, RN  Pain Management Interventions: medication (see MAR)  Goal: Readiness for Transition of Care  Outcome: Progressing

## 2024-09-01 NOTE — PLAN OF CARE
"Goal Outcome Evaluation:      Plan of Care Reviewed With: patient    Overall Patient Progress: improvingOverall Patient Progress: improving    Outcome Evaluation: Trace edema to BLE. Great output. B, 293. MD paged and 5 units Novolog given x1 w/ verbal request to recheck BG in 2 hours.      Problem: Adult Inpatient Plan of Care  Goal: Plan of Care Review  Description: The Plan of Care Review/Shift note should be completed every shift.  The Outcome Evaluation is a brief statement about your assessment that the patient is improving, declining, or no change.  This information will be displayed automatically on your shift  note.  Outcome: Progressing  Flowsheets (Taken 2024 0329)  Outcome Evaluation: Trace edema to BLE. Great output. B, 293. MD paged and 5 units Novolog given x1 w/ verbal request to recheck BG in 2 hours.  Plan of Care Reviewed With: patient  Overall Patient Progress: improving  Goal: Patient-Specific Goal (Individualized)  Description: You can add care plan individualizations to a care plan. Examples of Individualization might be:  \"Parent requests to be called daily at 9am for status\", \"I have a hard time hearing out of my right ear\", or \"Do not touch me to wake me up as it startles  me\".  Outcome: Progressing  Goal: Absence of Hospital-Acquired Illness or Injury  Outcome: Progressing  Intervention: Identify and Manage Fall Risk  Recent Flowsheet Documentation  Taken 2024 by Dorothy Lara RN  Safety Promotion/Fall Prevention: safety round/check completed  Taken 2024 by Dorothy Lara RN  Safety Promotion/Fall Prevention: safety round/check completed  Intervention: Prevent Skin Injury  Recent Flowsheet Documentation  Taken 2024 2345 by Dorothy Lara RN  Body Position: position changed independently  Taken 2024 2100 by Dorothy Lara RN  Body Position: position changed independently  Intervention: Prevent and Manage VTE (Venous Thromboembolism) " Risk  Recent Flowsheet Documentation  Taken 8/31/2024 2123 by Dorothy Lara RN  VTE Prevention/Management: SCDs off (sequential compression devices)  Intervention: Prevent Infection  Recent Flowsheet Documentation  Taken 8/31/2024 2345 by Dorothy Lara RN  Infection Prevention:   single patient room provided   rest/sleep promoted   personal protective equipment utilized   hand hygiene promoted   equipment surfaces disinfected   environmental surveillance performed   cohorting utilized  Taken 8/31/2024 2123 by Dorothy Lara RN  Infection Prevention:   single patient room provided   rest/sleep promoted   personal protective equipment utilized   hand hygiene promoted   equipment surfaces disinfected   environmental surveillance performed   cohorting utilized  Goal: Optimal Comfort and Wellbeing  Outcome: Progressing  Goal: Readiness for Transition of Care  Outcome: Progressing     Problem: Fluid Volume Deficit  Goal: Fluid Balance  Outcome: Progressing     Problem: Comorbidity Management  Goal: Maintenance of Asthma Control  Intervention: Maintain Asthma Symptom Control  Recent Flowsheet Documentation  Taken 8/31/2024 2345 by Dorothy Lara RN  Medication Review/Management: medications reviewed  Taken 8/31/2024 2123 by Dorothy Lara RN  Medication Review/Management: medications reviewed  Goal: Maintenance of Heart Failure Symptom Control  Outcome: Progressing  Intervention: Maintain Heart Failure Management  Recent Flowsheet Documentation  Taken 8/31/2024 2345 by Dorothy Lara RN  Medication Review/Management: medications reviewed  Taken 8/31/2024 2123 by Dorothy Lara RN  Medication Review/Management: medications reviewed  Goal: Bariatric Home Regimen Maintained  Intervention: Maintain and Manage Postbariatric Surgery Care  Recent Flowsheet Documentation  Taken 8/31/2024 2345 by Dorothy Lara RN  Medication Review/Management: medications reviewed  Taken 8/31/2024 2123 by Dorothy Lara  RN  Medication Review/Management: medications reviewed  Goal: Blood Glucose Levels Within Targeted Range  Outcome: Not Progressing  Intervention: Monitor and Manage Glycemia  Recent Flowsheet Documentation  Taken 8/31/2024 2345 by Dorothy Lara, RN  Medication Review/Management: medications reviewed  Taken 8/31/2024 2123 by Dorothy Lara, RN  Medication Review/Management: medications reviewed

## 2024-09-01 NOTE — PROVIDER NOTIFICATION
MD paged: Pt unable to maintain sats >90 on RA. Can you place orders for O2? Sating well on 1/2 to 1 L via NC. Also, -180s. Do you want to add a PRN with parameters? Thanks.     Per MD: No need for PRN for BP. O2 orders added.

## 2024-09-01 NOTE — PROVIDER NOTIFICATION
MD paged: FYI:  after giving 5 units Novolog. Do you want to add one time dose? Thanks.     No insulin needed per MD.

## 2024-09-01 NOTE — PROGRESS NOTES
[facility]    Medicine Progress Note - Hospitalist Service    Date of Admission:  8/30/2024    Assessment & Plan   Savanna Rehman is a 83 year old female admitted on 8/30/2024. She has a past medical history significant for atrial fibrillation, cirrhosis, congestive heart failure, coronary artery disease, diabetes mellitus type 2, fibromyalgia, GERD, gout, and sleep apnea.  She began noticing very small little red spots on her arms, legs, and abdomen starting 2 days ago.  Seems to be getting more of the spots.  Presented to emergency room for further evaluation.  She has also been noticing some bright red blood from either vaginal or rectal bleeding.  She has not completely sure which area or possibly both areas this is coming from.  Was found to have severe thrombocytopenia on lab testing.    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 Baseline appears to be around 95-130k  -given 3 units of Platelets with minimal improvement  -unclear if PPI/cirrhosis/coumadin are contributing  -hold PPI and coumadin for now  -seen by hematology, started on IVIG and decadron on 8/31 (plan for 4 day course) with improvement from 6 to 17k today    Diabetes mellitus type 2, poorly controlled  -Aspart insulin sliding scale as needed.  -Hold glipizide  -start lantus 15 and increased to high dose ISS    Cirrhosis  Volume overload  -Follows with [facility], on lasix outpt    Sleep apnea.  -Wear CPAP while sleeping.    Atrial fibrillation.  Drug-induced coagulation defect.  Coronary artery disease.  Chronic heart failure with preserved ejection fraction.  -Hold warfarin.  -some fluid overload from IVIG improved after IV lasix x1  -cont home lasix 40 oral, may need PRN diuresis in addition    GERD/esophagitis  -had recent EGD, was on protonix but holding due to thrombocytopenia    Chronic kidney disease stage IIIa.  -Creatinine appears to be at baseline.        Diet:  Combination Diet Regular Diet Adult    DVT Prophylaxis: Pneumatic Compression Devices  Aguilar Catheter: Not present  Lines: None     Cardiac Monitoring: None  Code Status: No CPR- Pre-arrest intubation OK         Disposition Plan     Medically Ready for Discharge: Anticipated in 2-4 Days         Marc Abdul DO  Hospitalist Service  [facility]  Securely message with [employer] (more info)  Text page via [employer] Paging/Directory   ______________________________________________________________________    Interval History   Tolerating IVIG and prednisone but blood sugar has been elevated. Platelets are better today but remain low. Has mild headache, no focal deficits but generally weak and asking for PT eval    Physical Exam   Vital Signs: Temp: 98.5  F (36.9  C) Temp src: Oral BP: (!) 171/59 Pulse: 63   Resp: 16 SpO2: 95 % O2 Device: None (Room air) Oxygen Delivery: 1/2 LPM  Weight: 235 lbs 11.2 oz  Constitutional: awake, alert, and cooperative  Eyes: pupils equal, round and reactive to light and conjunctiva normal  ENT: normocephalic, without obvious abnormality, atraumatic  Respiratory: no increased work of breathing, good air exchange, and clear to auscultation  Cardiovascular: regular rate and rhythm and no murmur noted  GI: normal bowel sounds, soft, and non-distended  Skin: scattered bruising on extremities and worse on arms, no rashes  Neurologic: alert, interactive, no focal deficits    45 MINUTES SPENT BY ME on the date of service doing chart review, history, exam, documentation & further activities per the note.      Data   ------------------------- PAST 24 HR DATA REVIEWED -----------------------------------------------    I have personally reviewed the following data over the past 24 hrs:    3.6 (L)  \   12.5   / 20 (LL)     134 (L) 93 (L) 23.4 (H) /  263 (H)   4.0 27 1.03 (H) \     INR:  1.41 (H) PTT:  31   D-dimer:  N/A Fibrinogen:  377     Ferritin:  N/A % Retic:  2.4 (H) LDH:  N/A        Imaging results reviewed over the past 24 hrs:   No results found for this or any previous visit (from the past 24 hour(s)).

## 2024-09-01 NOTE — CONSULTS
Care Management Initial Consult    General Information  Assessment completed with: Patient, Spouse or significant other, VM-chart review, patient  Type of CM/SW Visit: Initial Assessment    Primary Care Provider verified and updated as needed: Yes   Readmission within the last 30 days: no previous admission in last 30 days      Reason for Consult: discharge planning  Advance Care Planning: Advance Care Planning Reviewed: present on chart          Communication Assessment  Patient's communication style: spoken language (English or Bilingual)    Hearing Difficulty or Deaf: no   Wear Glasses or Blind: yes    Cognitive  Cognitive/Neuro/Behavioral: WDL                      Living Environment:   People in home: alone     Current living Arrangements: independent living facility      Able to return to prior arrangements:  (to be determined)  Living Arrangement Comments:  (patient lives apart from spouse)    Family/Social Support:  Care provided by: homecare agency, self, child(shira)  Provides care for: no one  Marital Status:   Support system: Children, , Friend (grandchildren)  Anthony       Description of Support System: Supportive, Involved    Support Assessment: Adequate family and caregiver support, Adequate social supports, Patient communicates needs well met    Current Resources:   Patient receiving home care services: Yes  Skilled Home Care Services: Skilled Nursing     Community Resources: County Programs, , Transportation Services  Equipment currently used at home: wheelchair, power, walker, rolling, grab bar, toilet, grab bar, tub/shower  Supplies currently used at home: Incontinence Supplies    Employment/Financial:  Employment Status: retired        Financial Concerns: none   Referral to Financial Worker: No       Does the patient's insurance plan have a 3 day qualifying hospital stay waiver?  Yes     Which insurance plan 3 day waiver is available? Alternative insurance waiver    Will  the waiver be used for post-acute placement? Undetermined at this time    Lifestyle & Psychosocial Needs:  Social Determinants of Health     Food Insecurity: Low Risk  (8/31/2024)    Food Insecurity     Within the past 12 months, did you worry that your food would run out before you got money to buy more?: No     Within the past 12 months, did the food you bought just not last and you didn t have money to get more?: No   Depression: Not at risk (7/26/2024)    PHQ-2     PHQ-2 Score: 1   Housing Stability: Low Risk  (8/31/2024)    Housing Stability     Do you have housing? : Yes     Are you worried about losing your housing?: No   Tobacco Use: Medium Risk (8/12/2024)    Patient History     Smoking Tobacco Use: Former     Smokeless Tobacco Use: Never     Passive Exposure: Past   Financial Resource Strain: Low Risk  (8/31/2024)    Financial Resource Strain     Within the past 12 months, have you or your family members you live with been unable to get utilities (heat, electricity) when it was really needed?: No   Alcohol Use: Not At Risk (1/27/2022)    AUDIT-C     Frequency of Alcohol Consumption: Monthly or less     Average Number of Drinks: 1 or 2     Frequency of Binge Drinking: Never   Transportation Needs: High Risk (8/31/2024)    Transportation Needs     Within the past 12 months, has lack of transportation kept you from medical appointments, getting your medicines, non-medical meetings or appointments, work, or from getting things that you need?: Yes   Physical Activity: Not on file   Interpersonal Safety: Low Risk  (8/31/2024)    Interpersonal Safety     Do you feel physically and emotionally safe where you currently live?: Yes     Within the past 12 months, have you been hit, slapped, kicked or otherwise physically hurt by someone?: No     Within the past 12 months, have you been humiliated or emotionally abused in other ways by your partner or ex-partner?: No   Stress: Stress Concern Present (1/27/2022)     [facility] Occupational [facility] - Occupational Stress Questionnaire     Feeling of Stress : Very much   Social Connections: Unknown (1/3/2024)    [facility]    Social Connections     Frequency of Communication with Friends and Family: Not on file   Health Literacy: Not on file       Functional Status:  Prior to admission patient needed assistance:   Dependent ADLs:: Ambulation-walker (electric scooter for apt complex and lift chair.)  Dependent IADLs:: Cleaning, Medication Management, Transportation       Mental Health Status:  Mental Health Status: No Current Concerns       Chemical Dependency Status:  Chemical Dependency Status: No Current Concerns       Values/Beliefs:  Spiritual, Cultural Beliefs, Tenriism Practices, Values that affect care:    Description of Beliefs that Will Affect Care: Alevism      Discussed  Partnership in Safe Discharge Planning  document with patient/family: No    Additional Information:  SW consulted for elevated URR. SW met with pt for assessment. Pt's spouse, Anthony was in the room.  Pt lives alone at [facility] and shared how she loves it there.  Pt has a 4WW and an electric scooter (apt complex mobility). She uses [employer] to go grocery shopping and her grandchildren ride with her. Family helps with housekeeping.    Pt shared pt has [facility] for RN (INR and med management). SW contacted them but pt isn't open. SW reviewed chart, pt has info stating [facility]. SW attempted to call but they are closed.  SARAVANAN obtained RN number Raj, [phone] and left a  to confirm services.    Pt states she has EW through [facility], Iwona Chavez. EW helped with mobility equipment, incontinent supplies and is PCA services. Pt shared the PCA never started due to their personal problems and the company [employer] is short staffed.     Pt would like to go to TCU at  [facility] Rm. SARAVANAN discussed process for TCU, PT is consulted and will send if recommended. Pt stated she only wants [facility] and if she can't get in she'll go home.  SW discussed home care services could be an option.    Update: Raj returned call. Confirmed weekly RN services and has been with pt for 8 years. Fax orders  [phone]. The agency does not provide therapy services, so if pt needs them the need to go through a different agency.    Next Steps: SARAVANAN/NIDA will await PT recs to assist with discharge planning. If TCU, will send referral to [facility].    ANA LUISA Ratliff, [facility] Hospital[facility]  Inpatient Care Coordination  [facility]  [phone]      LAUREN RATLIFF

## 2024-09-01 NOTE — PROGRESS NOTES
Cross Cover    Called for - received 2 unit(s) aspart and glipizide 2.5 mg and now BG is 293  Ordered aspart 5 unit(s) x 1 and raman in 2 hour (received 40 mg dexamethasone yest afternoon)

## 2024-09-01 NOTE — PLAN OF CARE
"A/O4. Up SBA w walker. Denies pain, SOB. 2 units of platelets given. IVIG infusion started. VSS ex HTN. PO lasix restarted, extra dose ordered. , 132, 156. Tylenol and benadryl given before IVIG started. Very high bleed risk. PIV infusing. Heme/onc consult today. Platelet recheck in the AM. Alarms on. Vital signs monitored every hour.     Goal Outcome Evaluation:      Plan of Care Reviewed With: patient    Overall Patient Progress: improvingOverall Patient Progress: improving    Outcome Evaluation: Platelets and IVIG given    Problem: Adult Inpatient Plan of Care  Goal: Plan of Care Review  Description: The Plan of Care Review/Shift note should be completed every shift.  The Outcome Evaluation is a brief statement about your assessment that the patient is improving, declining, or no change.  This information will be displayed automatically on your shift  note.  Outcome: Progressing  Flowsheets (Taken 8/31/2024 1929)  Outcome Evaluation: Platelets and IVIG given  Plan of Care Reviewed With: patient  Overall Patient Progress: improving  Goal: Patient-Specific Goal (Individualized)  Description: You can add care plan individualizations to a care plan. Examples of Individualization might be:  \"Parent requests to be called daily at 9am for status\", \"I have a hard time hearing out of my right ear\", or \"Do not touch me to wake me up as it startles  me\".  Outcome: Progressing  Goal: Absence of Hospital-Acquired Illness or Injury  Outcome: Progressing  Intervention: Identify and Manage Fall Risk  Recent Flowsheet Documentation  Taken 8/31/2024 0814 by Lynsey Villagran RN  Safety Promotion/Fall Prevention: safety round/check completed  Intervention: Prevent Skin Injury  Recent Flowsheet Documentation  Taken 8/31/2024 0814 by Lynsey Villagran, RN  Body Position: position changed independently  Intervention: Prevent Infection  Recent Flowsheet Documentation  Taken 8/31/2024 0814 by Lynsey Villagran, RN  Infection " Prevention:   single patient room provided   rest/sleep promoted   hand hygiene promoted   personal protective equipment utilized   equipment surfaces disinfected   environmental surveillance performed   cohorting utilized  Goal: Optimal Comfort and Wellbeing  Outcome: Progressing  Intervention: Monitor Pain and Promote Comfort  Recent Flowsheet Documentation  Taken 8/31/2024 0814 by Lynsey Villagran RN  Pain Management Interventions: declines  Goal: Readiness for Transition of Care  Outcome: Progressing

## 2024-09-02 ENCOUNTER — APPOINTMENT (OUTPATIENT)
Dept: CT IMAGING | Facility: CLINIC | Age: 84
DRG: 813 | End: 2024-09-02
Attending: HOSPITALIST
Payer: COMMERCIAL

## 2024-09-02 ENCOUNTER — APPOINTMENT (OUTPATIENT)
Dept: PHYSICAL THERAPY | Facility: CLINIC | Age: 84
DRG: 813 | End: 2024-09-02
Attending: HOSPITALIST
Payer: COMMERCIAL

## 2024-09-02 LAB
ALBUMIN UR-MCNC: NEGATIVE MG/DL
APPEARANCE UR: ABNORMAL
ATRIAL RATE - MUSE: 91 BPM
BACTERIA #/AREA URNS HPF: ABNORMAL /HPF
BILIRUB UR QL STRIP: NEGATIVE
COLOR UR AUTO: YELLOW
DIASTOLIC BLOOD PRESSURE - MUSE: NORMAL MMHG
ERYTHROCYTE [DISTWIDTH] IN BLOOD BY AUTOMATED COUNT: 13.4 % (ref 10–15)
GLUCOSE BLDC GLUCOMTR-MCNC: 196 MG/DL (ref 70–99)
GLUCOSE BLDC GLUCOMTR-MCNC: 233 MG/DL (ref 70–99)
GLUCOSE BLDC GLUCOMTR-MCNC: 281 MG/DL (ref 70–99)
GLUCOSE BLDC GLUCOMTR-MCNC: 282 MG/DL (ref 70–99)
GLUCOSE BLDC GLUCOMTR-MCNC: 301 MG/DL (ref 70–99)
GLUCOSE BLDC GLUCOMTR-MCNC: 318 MG/DL (ref 70–99)
GLUCOSE BLDC GLUCOMTR-MCNC: 367 MG/DL (ref 70–99)
GLUCOSE UR STRIP-MCNC: NEGATIVE MG/DL
HCT VFR BLD AUTO: 33.5 % (ref 35–47)
HGB BLD-MCNC: 11.4 G/DL (ref 11.7–15.7)
HGB UR QL STRIP: NEGATIVE
HOLD SPECIMEN: NORMAL
INTERPRETATION ECG - MUSE: NORMAL
KETONES UR STRIP-MCNC: NEGATIVE MG/DL
LACTATE SERPL-SCNC: 2.2 MMOL/L (ref 0.7–2)
LEUKOCYTE ESTERASE UR QL STRIP: ABNORMAL
MCH RBC QN AUTO: 30.4 PG (ref 26.5–33)
MCHC RBC AUTO-ENTMCNC: 34 G/DL (ref 31.5–36.5)
MCV RBC AUTO: 89 FL (ref 78–100)
MUCOUS THREADS #/AREA URNS LPF: PRESENT /LPF
NITRATE UR QL: NEGATIVE
P AXIS - MUSE: NORMAL DEGREES
PH UR STRIP: 6 [PH] (ref 5–7)
PLATELET # BLD AUTO: 51 10E3/UL (ref 150–450)
PR INTERVAL - MUSE: NORMAL MS
PROCALCITONIN SERPL IA-MCNC: 0.07 NG/ML
QRS DURATION - MUSE: 98 MS
QT - MUSE: 352 MS
QTC - MUSE: 413 MS
R AXIS - MUSE: -32 DEGREES
RBC # BLD AUTO: 3.75 10E6/UL (ref 3.8–5.2)
RBC URINE: <1 /HPF
SP GR UR STRIP: 1.02 (ref 1–1.03)
SQUAMOUS EPITHELIAL: 1 /HPF
SYSTOLIC BLOOD PRESSURE - MUSE: NORMAL MMHG
T AXIS - MUSE: 123 DEGREES
UROBILINOGEN UR STRIP-MCNC: NORMAL MG/DL
VENTRICULAR RATE- MUSE: 83 BPM
WBC # BLD AUTO: 7.2 10E3/UL (ref 4–11)
WBC URINE: 32 /HPF

## 2024-09-02 PROCEDURE — 97530 THERAPEUTIC ACTIVITIES: CPT | Mod: GP

## 2024-09-02 PROCEDURE — 36415 COLL VENOUS BLD VENIPUNCTURE: CPT | Performed by: HOSPITALIST

## 2024-09-02 PROCEDURE — 258N000003 HC RX IP 258 OP 636: Performed by: HOSPITALIST

## 2024-09-02 PROCEDURE — 81001 URINALYSIS AUTO W/SCOPE: CPT | Performed by: HOSPITALIST

## 2024-09-02 PROCEDURE — 250N000013 HC RX MED GY IP 250 OP 250 PS 637: Performed by: INTERNAL MEDICINE

## 2024-09-02 PROCEDURE — 87086 URINE CULTURE/COLONY COUNT: CPT | Performed by: HOSPITALIST

## 2024-09-02 PROCEDURE — 85027 COMPLETE CBC AUTOMATED: CPT | Performed by: HOSPITALIST

## 2024-09-02 PROCEDURE — 250N000012 HC RX MED GY IP 250 OP 636 PS 637: Performed by: INTERNAL MEDICINE

## 2024-09-02 PROCEDURE — 120N000001 HC R&B MED SURG/OB

## 2024-09-02 PROCEDURE — 250N000013 HC RX MED GY IP 250 OP 250 PS 637: Performed by: HOSPITALIST

## 2024-09-02 PROCEDURE — 87186 SC STD MICRODIL/AGAR DIL: CPT | Performed by: HOSPITALIST

## 2024-09-02 PROCEDURE — 83605 ASSAY OF LACTIC ACID: CPT | Performed by: HOSPITALIST

## 2024-09-02 PROCEDURE — 99233 SBSQ HOSP IP/OBS HIGH 50: CPT | Performed by: INTERNAL MEDICINE

## 2024-09-02 PROCEDURE — 97116 GAIT TRAINING THERAPY: CPT | Mod: GP

## 2024-09-02 PROCEDURE — 97162 PT EVAL MOD COMPLEX 30 MIN: CPT | Mod: GP

## 2024-09-02 PROCEDURE — 250N000011 HC RX IP 250 OP 636: Performed by: HOSPITALIST

## 2024-09-02 PROCEDURE — 70450 CT HEAD/BRAIN W/O DYE: CPT

## 2024-09-02 PROCEDURE — 99232 SBSQ HOSP IP/OBS MODERATE 35: CPT | Performed by: HOSPITALIST

## 2024-09-02 PROCEDURE — 84145 PROCALCITONIN (PCT): CPT | Performed by: HOSPITALIST

## 2024-09-02 RX ORDER — SODIUM CHLORIDE 9 MG/ML
INJECTION, SOLUTION INTRAVENOUS CONTINUOUS
Status: DISCONTINUED | OUTPATIENT
Start: 2024-09-02 | End: 2024-09-03

## 2024-09-02 RX ORDER — CEFTRIAXONE 1 G/1
1 INJECTION, POWDER, FOR SOLUTION INTRAMUSCULAR; INTRAVENOUS EVERY 24 HOURS
Status: COMPLETED | OUTPATIENT
Start: 2024-09-02 | End: 2024-09-04

## 2024-09-02 RX ADMIN — SODIUM CHLORIDE: 9 INJECTION, SOLUTION INTRAVENOUS at 11:13

## 2024-09-02 RX ADMIN — DEXAMETHASONE 40 MG: 4 TABLET ORAL at 08:41

## 2024-09-02 RX ADMIN — CEFTRIAXONE 1 G: 1 INJECTION, POWDER, FOR SOLUTION INTRAMUSCULAR; INTRAVENOUS at 14:50

## 2024-09-02 RX ADMIN — GLIPIZIDE 2.5 MG: 2.5 TABLET, FILM COATED, EXTENDED RELEASE ORAL at 16:58

## 2024-09-02 RX ADMIN — FAMOTIDINE 10 MG: 10 TABLET, FILM COATED ORAL at 22:09

## 2024-09-02 RX ADMIN — ACETAMINOPHEN, ASPIRIN, CAFFEINE 2 TABLET: 250; 65; 250 TABLET, FILM COATED ORAL at 16:58

## 2024-09-02 RX ADMIN — LOPERAMIDE HYDROCHLORIDE 2 MG: 2 CAPSULE ORAL at 14:28

## 2024-09-02 RX ADMIN — FUROSEMIDE 40 MG: 40 TABLET ORAL at 08:40

## 2024-09-02 RX ADMIN — FAMOTIDINE 10 MG: 10 TABLET, FILM COATED ORAL at 08:40

## 2024-09-02 RX ADMIN — GLIPIZIDE 2.5 MG: 2.5 TABLET, FILM COATED, EXTENDED RELEASE ORAL at 08:40

## 2024-09-02 RX ADMIN — ESCITALOPRAM OXALATE 10 MG: 10 TABLET ORAL at 22:09

## 2024-09-02 ASSESSMENT — ACTIVITIES OF DAILY LIVING (ADL)
ADLS_ACUITY_SCORE: 47
ADLS_ACUITY_SCORE: 45
ADLS_ACUITY_SCORE: 46
ADLS_ACUITY_SCORE: 43
ADLS_ACUITY_SCORE: 47
ADLS_ACUITY_SCORE: 46
ADLS_ACUITY_SCORE: 46
ADLS_ACUITY_SCORE: 47
ADLS_ACUITY_SCORE: 45
ADLS_ACUITY_SCORE: 45
ADLS_ACUITY_SCORE: 47
ADLS_ACUITY_SCORE: 45
ADLS_ACUITY_SCORE: 49
ADLS_ACUITY_SCORE: 47
ADLS_ACUITY_SCORE: 49
ADLS_ACUITY_SCORE: 46
ADLS_ACUITY_SCORE: 43
ADLS_ACUITY_SCORE: 45
ADLS_ACUITY_SCORE: 45
ADLS_ACUITY_SCORE: 47
ADLS_ACUITY_SCORE: 47

## 2024-09-02 NOTE — PROGRESS NOTES
Bed alarm went off. Staff prompt to bedside. Found pt standing up w/o assistive devices. Pt wanted to go the bathroom, believed the bathroom was outside of her room. Staff helped pt safely to the bathroom inside her room.

## 2024-09-02 NOTE — PROGRESS NOTES
[facility]    Medicine Progress Note - Hospitalist Service    Date of Admission:  8/30/2024    Assessment & Plan   Savanna Rehman is a 83 year old female admitted on 8/30/2024. She has a past medical history significant for atrial fibrillation, cirrhosis, congestive heart failure, coronary artery disease, diabetes mellitus type 2, fibromyalgia, GERD, gout, and sleep apnea.  She began noticing very small little red spots on her arms, legs, and abdomen starting 2 days ago.  Seems to be getting more of the spots.  Presented to emergency room for further evaluation.  She has also been noticing some bright red blood from either vaginal or rectal bleeding.  She has not completely sure which area or possibly both areas this is coming from.  Was found to have severe thrombocytopenia on lab testing.    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 Baseline appears to be around 95-130k  -given 3 units of Platelets with minimal improvement  -unclear if PPI/cirrhosis/coumadin are contributing  -hold PPI and coumadin   -seen by hematology, started on IVIG and decadron on 8/31 (plan for 4 day course) with improvement to 51 today    Encephalopathy  -some confusion and questionable speech difficulty this morning, no focal deficits  -CT head reassuring, does show old CVA's  -resolved on it's own, appears to be related to the decadron, monitor while requiring decadron    Abnormal UA  Lactic acidosis  -unclear etiology for lactic acidosis, could be related to steroids, hyperglycemia, cirrhosis but has abnormal UA. Doesn't even meet SIRS criteria so doubt this is sepsis and procal is negative but monitor closely  -err on side of caution while on steroids/IVIG and start on Rocephin for possible UTI  -NS at 100    Diabetes mellitus type 2, poorly controlled  -Aspart insulin sliding scale as needed.  -Hold glipizide  -increase lantus to 15 BID, high dose  ISS    Cirrhosis  Volume overload  -Follows with [facility], on lasix outpt    Sleep apnea.  -Wear CPAP while sleeping.    Atrial fibrillation.  Drug-induced coagulation defect.  Coronary artery disease.  Chronic heart failure with preserved ejection fraction.  -Hold warfarin.  -some fluid overload from IVIG improved after IV lasix x1  -cont home lasix 40 oral, may need PRN diuresis in addition    GERD/esophagitis  -had recent EGD, was on protonix but holding due to thrombocytopenia  -on pepcid now    Chronic kidney disease stage IIIa.  -Creatinine appears to be at baseline.          Diet: Combination Diet Regular Diet Adult    DVT Prophylaxis: Pneumatic Compression Devices  Aguilar Catheter: Not present  Lines: None     Cardiac Monitoring: None  Code Status: No CPR- Pre-arrest intubation OK         Disposition Plan     Medically Ready for Discharge: Anticipated in 2-4 Days         Marc Abdul DO  Hospitalist Service  [facility]  Securely message with [employer] (more info)  Text page via [employer] Paging/Directory   ______________________________________________________________________    Interval History   Had some confusion this am but CT was reassuring, doing better in the afternoon, ambulating. No fever or chills    Physical Exam   Vital Signs: Temp: 97.5  F (36.4  C) Temp src: Axillary BP: (!) 158/61 Pulse: 62   Resp: 20 SpO2: 97 % O2 Device: None (Room air)    Weight: 238 lbs 0 oz  Constitutional: awake, alert, and cooperative  Eyes: pupils equal, round and reactive to light and conjunctiva normal  ENT: normocephalic, without obvious abnormality, atraumatic  Respiratory: no increased work of breathing, good air exchange, and clear to auscultation  Cardiovascular: regular rate and rhythm and no murmur noted  GI: normal bowel sounds, soft, and non-distended  Skin: scattered bruising on extremities and worse on arms, no rashes  Neurologic: alert, disoriented, some questionable aphasia periodically,  no focal deficits, no pronator drift    45 MINUTES SPENT BY ME on the date of service doing chart review, history, exam, documentation & further activities per the note.      Data   ------------------------- PAST 24 HR DATA REVIEWED -----------------------------------------------    I have personally reviewed the following data over the past 24 hrs:    7.2  \   11.4 (L)   / 51 (L)     N/A N/A N/A /  196 (H)   N/A N/A N/A \     Procal: 0.07 CRP: N/A Lactic Acid: 2.2 (H)         Imaging results reviewed over the past 24 hrs:   Recent Results (from the past 24 hour(s))   CT Head w/o Contrast    Narrative    EXAM: CT HEAD W/O CONTRAST  LOCATION: [facility]  DATE: 9/2/2024    INDICATION: Confusion, rule out CVA.  COMPARISON: Brain MRI 9/21/2023, head CT 9/21/2023.  TECHNIQUE: Routine CT Head without IV contrast. Multiplanar reformats. Dose reduction techniques were used.    FINDINGS:  Several images are degraded by patient motion artifact, which moderately limits evaluation.    INTRACRANIAL CONTENTS: No intracranial hemorrhage, extraaxial collection, or mass effect.  No CT evidence of acute infarct. Moderate to severe presumed chronic small vessel ischemic changes throughout the cerebral hemispheric white matter bilaterally.   Small region of chronic cortical infarction in the right occipital lobe, within the right posterior cerebral artery territory. Multiple chronic bilateral basal ganglia and thalamic lacunar infarcts. Moderate generalized volume loss. No hydrocephalus.     VISUALIZED ORBITS/SINUSES/MASTOIDS: Prior bilateral cataract surgery. Visualized portions of the orbits are otherwise unremarkable. No paranasal sinus mucosal disease. No middle ear or mastoid effusion.    BONES/SOFT TISSUES: No acute abnormality.      Impression    IMPRESSION:  1.  Motion degraded examination demonstrates no definite acute intracranial process.  2.  Stable moderate to severe chronic small vessel ischemic  disease and moderate generalized brain parenchymal volume loss since 9/21/2023.  3.  Stable small region of chronic infarction within the right posterior cerebral artery territory.

## 2024-09-02 NOTE — PROVIDER NOTIFICATION
"MD (Hammond) paged: \"Paging per orders, . Will administer 10 units per sliding scale orders. please advise as needed, thanks!\"  "

## 2024-09-02 NOTE — PROGRESS NOTES
Care Management Follow Up    Length of Stay (days): 3    Expected Discharge Date: 09/04/2024     Concerns to be Addressed: discharge planning     Patient plan of care discussed at interdisciplinary rounds: Yes    Anticipated Discharge Disposition:  TCU    Patient/family educated on Medicare website which has current facility and service quality ratings:  yes  Education Provided on the Discharge Plan:  yes  Patient/Family in Agreement with the Plan:  yes    Referrals Placed by CM/SW:  TCU    Discussed  Partnership in Safe Discharge Planning  document with patient/family: No     Additional Information:  Physical therapist met with patient today and is recommending TCU for patient at discharge. Patient is only willing to go to [facility] in [place]. Referral sent to TCU, awaiting response from facility.       Next Steps: Follow up with [facility]CU on 9/3/24.        Isis Dodson RN  Care Coordinator  [facility]  [phone]

## 2024-09-02 NOTE — PROGRESS NOTES
[facility] Hematology / Oncology  Progress Note  Name: Savanna Rehman  :  1940  MRN:  [id number]    --------------------    Assessment / Plan:  Presumed immune thrombocytopenia.  Atrial fibrillation on chronic anticoagulation.  Cirrhosis.  Congestive heart failure.  Coronary artery disease.  Diabetes mellitus type 2.  Fibromyalgia.  GERD.  Gout.  Sleep apnea.    Presumed immune mediated thrombocytopenia given abrupt drop over the last month and response to IVIG and steroids.  Precipitating event is unclear outside of recent initiation of Protonix; replaced Protonix with Zantac.  No signs of prior lymphoproliferative disorder on recent imaging.  Status post IVIG 1 g/kg day 2 today; tolerated w/o issue.  Continue dexamethasone 40 mg daily day 3 of 4 planned.  Appreciate hospitalist care hyperglycemia and volume w/ treatments above.  Head CT pending.  Hold Coumadin until platelets are above 50,000 consistently.  Daily CBC.    Anupam Shelton MD    --------------------    Interval History:  Savanna presents for follow-up of ITP.  Reports of confusion his AM; better and seems back to baseline during my visit.  No external bleeding.    --------------------    Family History:  Family History   Problem Relation Age of Onset    Alzheimer Disease Mother     Lung Cancer Father     No Known Problems Brother     No Known Problems Brother     No Known Problems Brother     Unknown/Adopted No family hx of        Social History:  Social History     Tobacco Use    Smoking status: Former     Current packs/day: 0.00     Average packs/day: 0.5 packs/day for 50.0 years (25.0 ttl pk-yrs)     Types: Cigarettes     Start date: 1964     Quit date: 2014     Years since quitting: 10.0     Passive exposure: Past    Smokeless tobacco: Never   Vaping Use    Vaping status: Never Used   Substance Use Topics    Alcohol use: Not Currently    Drug use: Never       Medications / Allergies:  Reviewed in  "EMR.    --------------------    Physical Exam:  VS: BP (!) 158/61 (BP Location: Right arm, Patient Position: Semi-Liao's, Cuff Size: Adult Regular)   Pulse 62   Temp 97.5  F (36.4  C) (Axillary)   Resp 20   Ht 1.727 m (5' 8\")   Wt 108 kg (238 lb)   LMP  (LMP Unknown)   SpO2 97%   BMI 36.19 kg/m    GEN: Well appearing.    Labs / Imaging / Path:  Reviewed CBC.  "

## 2024-09-02 NOTE — PLAN OF CARE
"Shift summary (0700-1930)    Pt was very confused this AM but has had improvement t/o shift. VSS except elevated BP on RA. PRN Excedrin given per pt request for reported HA. Denies CP, SOB. RPIV intact, infusing w/ NS @ 100 ml/hr. Up to bathroom and ambulating in halls Ax1 GB W. IV rocephin started for abnormal UA. Platelets up to 51,000 today, hem/onc following.     Goal Outcome Evaluation:      Plan of Care Reviewed With: patient    Overall Patient Progress: improvingOverall Patient Progress: improving    Outcome Evaluation: Improving mentation t/o this shift. IV abx started for abnormal UA.      Problem: Adult Inpatient Plan of Care  Goal: Plan of Care Review  Description: The Plan of Care Review/Shift note should be completed every shift.  The Outcome Evaluation is a brief statement about your assessment that the patient is improving, declining, or no change.  This information will be displayed automatically on your shift  note.  Outcome: Progressing  Flowsheets (Taken 9/2/2024 2881)  Outcome Evaluation: Improving mentation t/o this shift. IV abx started for abnormal UA.  Plan of Care Reviewed With: patient  Overall Patient Progress: improving  Goal: Patient-Specific Goal (Individualized)  Description: You can add care plan individualizations to a care plan. Examples of Individualization might be:  \"Parent requests to be called daily at 9am for status\", \"I have a hard time hearing out of my right ear\", or \"Do not touch me to wake me up as it startles  me\".  Outcome: Progressing  Goal: Absence of Hospital-Acquired Illness or Injury  Outcome: Progressing  Intervention: Identify and Manage Fall Risk  Recent Flowsheet Documentation  Taken 9/2/2024 1258 by Nora Garay RN  Safety Promotion/Fall Prevention: safety round/check completed  Taken 9/2/2024 0951 by Nora Garay RN  Safety Promotion/Fall Prevention: (hem/onc at bedside) --  Taken 9/2/2024 0836 by Nora Garay RN  Safety " Promotion/Fall Prevention: (MD at bedside, paged for confusion)   safety round/check completed   activity supervised   assistive device/personal items within reach   clutter free environment maintained   increased rounding and observation   increase visualization of patient   lighting adjusted   mobility aid in reach   nonskid shoes/slippers when out of bed   patient and family education   room door open   room organization consistent   supervised activity   treat reversible contributory factors   treat underlying cause  Intervention: Prevent Skin Injury  Recent Flowsheet Documentation  Taken 9/2/2024 0836 by Nora Garay RN  Body Position:   position changed independently   supine, head elevated  Skin Protection:   adhesive use limited   incontinence pads utilized  Device Skin Pressure Protection:   absorbent pad utilized/changed   adhesive use limited   pressure points protected   tubing/devices free from skin contact  Intervention: Prevent and Manage VTE (Venous Thromboembolism) Risk  Recent Flowsheet Documentation  Taken 9/2/2024 0836 by Nora Garay RN  VTE Prevention/Management: SCDs off (sequential compression devices)  Intervention: Prevent Infection  Recent Flowsheet Documentation  Taken 9/2/2024 0836 by Nora Garay RN  Infection Prevention:   equipment surfaces disinfected   hand hygiene promoted   personal protective equipment utilized   rest/sleep promoted   single patient room provided  Goal: Optimal Comfort and Wellbeing  Outcome: Progressing  Intervention: Monitor Pain and Promote Comfort  Recent Flowsheet Documentation  Taken 9/2/2024 1625 by Nora Garay RN  Pain Management Interventions: medication (see MAR)  Taken 9/2/2024 0838 by Nora Garay RN  Pain Management Interventions: (MD ordering MRI) --  Goal: Readiness for Transition of Care  Outcome: Progressing

## 2024-09-02 NOTE — PROGRESS NOTES
09/02/24 1300   Appointment Info   Signing Clinician's Name / Credentials (PT) Loreto Alva, PT, DPT   Rehab Comments (PT) Monitor platelets   Living Environment   People in Home alone   Current Living Arrangements independent living facility   Home Accessibility no concerns   Living Environment Comments Patient reports she lives in a 55+ ILF.  She sleeps in a standard bed, exits to left side of bed.   Self-Care   Usual Activity Tolerance moderate   Current Activity Tolerance moderate   Regular Exercise No   Equipment Currently Used at Home other (see comments);walker, rolling   Fall history within last six months yes   Number of times patient has fallen within last six months 1  (Fell out of bed)   Activity/Exercise/Self-Care Comment Patient reports baseline independence with dressing, bathing, and toileting.  She ambulates with a 4WW and does have a motorized scooter.  Patient receives assist for medication management.  Family assists with housekeeping.  Patient states her daughter is encouraging her to transition to snf.   General Information   Onset of Illness/Injury or Date of Surgery 08/30/24   Referring Physician Marc Abdul, DO   Patient/Family Therapy Goals Statement (PT) Patient would like to discharge to [facility].   Pertinent History of Current Problem (include personal factors and/or comorbidities that impact the POC) 83 year old female admitted on 8/30/2024. She has a past medical history significant for atrial fibrillation, cirrhosis, congestive heart failure, coronary artery disease, diabetes mellitus type 2, fibromyalgia, GERD, gout, and sleep apnea.  She began noticing very small little red spots on her arms, legs, and abdomen starting 2 days ago.  Seems to be getting more of the spots.  Presented to emergency room for further evaluation.  She has also been noticing some bright red blood from either vaginal or rectal bleeding.  She has not completely sure which area or possibly both  areas this is coming from.  Was found to have severe thrombocytopenia on lab testing.   Existing Precautions/Restrictions fall   Cognition   Affect/Mental Status (Cognition) WFL  (Intermittent confusion)   Orientation Status (Cognition) oriented x 3   Follows Commands (Cognition) follows one-step commands;over 90% accuracy;delayed response/completion;increased processing time needed   Pain Assessment   Patient Currently in Pain No   Integumentary/Edema   Integumentary/Edema Comments Age-related skin changes, multiple bruises scattered on B UE and LEs, small red spots on extremities   Posture    Posture Forward head position;Protracted shoulders   Range of Motion (ROM)   Range of Motion ROM is WFL   Strength (Manual Muscle Testing)   Strength (Manual Muscle Testing) Able to perform R SLR;Able to perform L SLR;Deficits observed during functional mobility   Bed Mobility   Comment, (Bed Mobility) SBA supine > sit, HOB flat and no bed rail.   Transfers   Comment, (Transfers) SBA for sit > stand from elevated bed height, demonstrates unsafe hand placement pulling on walker.  Does appropriately check that brakes applied on 4WW.   Gait/Stairs (Locomotion)   Comment, (Gait/Stairs) CGA 5' ambulation with 4WW, demonstrates slow gait speed and forward flexed posture.   Balance   Balance Comments Impaired dynamic balance, reporting 14 falls in past year but only 1 in past 6 months.  Reports consistent use of 4WW.   Sensory Examination   Sensory Perception Comments Baseline BLE neuropathy   Clinical Impression   Criteria for Skilled Therapeutic Intervention Yes, treatment indicated   PT Diagnosis (PT) Impaired functional mobility   Influenced by the following impairments Confusion, generalized weakness, deconditioning, impaired balance, decreased activity tolerance, low platelets   Functional limitations due to impairments Impaired independence with bed mobility, transfers, and gait   Clinical Presentation (PT Evaluation  Complexity) evolving   Clinical Presentation Rationale Clinical judgement, PMH, social support   Clinical Decision Making (Complexity) moderate complexity   Planned Therapy Interventions (PT) balance training;bed mobility training;gait training;home exercise program;neuromuscular re-education;patient/family education;postural re-education;strengthening;transfer training;progressive activity/exercise   Risk & Benefits of therapy have been explained evaluation/treatment results reviewed;care plan/treatment goals reviewed;risks/benefits reviewed;participants voiced agreement with care plan;participants included;patient;spouse/significant other   PT Total Evaluation Time   PT Eval, Moderate Complexity Minutes (40142) 12   Physical Therapy Goals   PT Frequency 5x/week   PT Predicted Duration/Target Date for Goal Attainment 09/06/24   PT Goals Bed Mobility;Transfers;Gait   PT: Bed Mobility Independent;Supine to/from sit;Rolling   PT: Transfers Modified independent;Sit to/from stand;Bed to/from chair;Assistive device   PT: Gait Modified independent;100 feet;Rolling walker   Interventions   Interventions Quick Adds Gait Training;Therapeutic Activity   Therapeutic Activity   Therapeutic Activities: dynamic activities to improve functional performance Minutes (82032) 15   Symptoms Noted During/After Treatment Fatigue   Treatment Detail/Skilled Intervention Patient greeted supine in bed, daughter and grandson present.  Patient awake, agreeable to PT session.  Feeling better than this morning but continues to have intermittent confusion.  Patient SBA for supine > sit, SBA for sitting balance with cues to scoot forward to position feet on floor.  Patient completes x4 sit <> stands from elevated bed with SBA, demonstrating unsafe hand placement.  Brief soiled with urine, transferring to toilet with 4WW and CGA, difficulty negotiating tight bathroom space with walker.  MinAx1 for sit <> stand from low toilet x2, CGA - minAx1 for  standing balance to don/doff brief.  IND pericares while seated on toilet.  Patient needing x1 seated rest on bench in torres, minAx1 for stand > sit with cues to reach back for chair and CGA for sit > stand.  Patient needing minAx1 to assist BLEs into bed during sit > supine.  Call light in reach and bed alarm activated.   Gait Training   Gait Training Minutes (93173) 9   Symptoms Noted During/After Treatment (Gait Training) fatigue   Treatment Detail/Skilled Intervention Patient ambulates 2 x 35' with 4WW and CGA, progressing to close SBA.  Ambulates with slow gait speed and wide ALLY.  Patient cued for upright posture and walker proximity to body.  Patient fatigues quickly with ambulation, requires seated rest.   Distance in Feet 5' eval, 2 x 35' treatment   PT Discharge Planning   PT Plan Repeated sit <> stands and gait with 4WW, LE strengthening   PT Discharge Recommendation (DC Rec) Transitional Care Facility   PT Rationale for DC Rec Patient lives alone in ILF, currently needing Ax1 for all mobility given confusion, weakness, deconditioning, and impaired balance.  Patient motivated for PT, would like TCU to progress safety and independence with mobility.  Patient states daughter is working to transition her to residential to increase assist available.  Patient uses a 4WW for all transfers and ambulation.   PT Brief overview of current status Ax1 4WW   Total Session Time   Timed Code Treatment Minutes 24   Total Session Time (sum of timed and untimed services) 36

## 2024-09-02 NOTE — PLAN OF CARE
"Isolated delirium early AM (see previous note), did mention to nurse twice at start of shift about her Dr. Pepper ringing. Otherwise A/O4. Very pleasant. BP remain high, but SBP <170. Loose BM. External cath removed, up to BR. BG high, 7 units of correctional insulin given, now 285. Slept okay. A1, walker.     Goal Outcome Evaluation:      Plan of Care Reviewed With: patient    Overall Patient Progress: no changeOverall Patient Progress: no change    Outcome Evaluation: No acute changes overnight    Problem: Adult Inpatient Plan of Care  Goal: Plan of Care Review  Description: The Plan of Care Review/Shift note should be completed every shift.  The Outcome Evaluation is a brief statement about your assessment that the patient is improving, declining, or no change.  This information will be displayed automatically on your shift  note.  Outcome: Progressing  Flowsheets (Taken 9/2/2024 0443)  Outcome Evaluation: No acute changes overnight  Plan of Care Reviewed With: patient  Overall Patient Progress: no change  Goal: Patient-Specific Goal (Individualized)  Description: You can add care plan individualizations to a care plan. Examples of Individualization might be:  \"Parent requests to be called daily at 9am for status\", \"I have a hard time hearing out of my right ear\", or \"Do not touch me to wake me up as it startles  me\".  Outcome: Progressing  Goal: Absence of Hospital-Acquired Illness or Injury  Outcome: Progressing  Intervention: Identify and Manage Fall Risk  Recent Flowsheet Documentation  Taken 9/1/2024 2155 by Lilibeth Sierra RN  Safety Promotion/Fall Prevention:   safety round/check completed   room organization consistent   room near nurse's station   room door open   clutter free environment maintained  Intervention: Prevent and Manage VTE (Venous Thromboembolism) Risk  Recent Flowsheet Documentation  Taken 9/1/2024 2155 by Lilibeth Sierra RN  VTE Prevention/Management: SCDs off (sequential compression " devices)  Goal: Optimal Comfort and Wellbeing  Outcome: Progressing  Goal: Readiness for Transition of Care  Outcome: Progressing     Problem: Fluid Volume Deficit  Goal: Fluid Balance  Outcome: Progressing     Problem: Comorbidity Management  Goal: Maintenance of Heart Failure Symptom Control  Outcome: Progressing  Intervention: Maintain Heart Failure Management  Recent Flowsheet Documentation  Taken 9/1/2024 2155 by Lilibeth Sierra RN  Medication Review/Management: medications reviewed  Goal: Blood Glucose Levels Within Targeted Range  Outcome: Progressing  Intervention: Monitor and Manage Glycemia  Recent Flowsheet Documentation  Taken 9/1/2024 2155 by Lilibeth Seirra RN  Medication Review/Management: medications reviewed     Problem: Bleeding Risk or Actual (Thrombocytopenia)  Goal: Absence of Bleeding  Outcome: Progressing

## 2024-09-02 NOTE — PROVIDER NOTIFICATION
"MD (Franko) paged @ 7930: \"Can you please stop by pt room? she keeps repeating what she's saying and has new confusion overnight per night RN\"    MD promptly evaluated pt at bedside. Head CT and UA ordered.     Pt down to CT now @ 0907.  "

## 2024-09-03 LAB
ANION GAP SERPL CALCULATED.3IONS-SCNC: 11 MMOL/L (ref 7–15)
BUN SERPL-MCNC: 38.6 MG/DL (ref 8–23)
CALCIUM SERPL-MCNC: 8.9 MG/DL (ref 8.8–10.4)
CHLORIDE SERPL-SCNC: 99 MMOL/L (ref 98–107)
CREAT SERPL-MCNC: 0.95 MG/DL (ref 0.51–0.95)
EGFRCR SERPLBLD CKD-EPI 2021: 60 ML/MIN/1.73M2
ERYTHROCYTE [DISTWIDTH] IN BLOOD BY AUTOMATED COUNT: 13.8 % (ref 10–15)
GLUCOSE BLDC GLUCOMTR-MCNC: 198 MG/DL (ref 70–99)
GLUCOSE BLDC GLUCOMTR-MCNC: 207 MG/DL (ref 70–99)
GLUCOSE BLDC GLUCOMTR-MCNC: 218 MG/DL (ref 70–99)
GLUCOSE BLDC GLUCOMTR-MCNC: 227 MG/DL (ref 70–99)
GLUCOSE BLDC GLUCOMTR-MCNC: 292 MG/DL (ref 70–99)
GLUCOSE SERPL-MCNC: 230 MG/DL (ref 70–99)
HCO3 SERPL-SCNC: 25 MMOL/L (ref 22–29)
HCT VFR BLD AUTO: 35.9 % (ref 35–47)
HGB BLD-MCNC: 12 G/DL (ref 11.7–15.7)
MCH RBC QN AUTO: 30.8 PG (ref 26.5–33)
MCHC RBC AUTO-ENTMCNC: 33.4 G/DL (ref 31.5–36.5)
MCV RBC AUTO: 92 FL (ref 78–100)
PATH REPORT.COMMENTS IMP SPEC: NORMAL
PATH REPORT.COMMENTS IMP SPEC: NORMAL
PATH REPORT.FINAL DX SPEC: NORMAL
PATH REPORT.MICROSCOPIC SPEC OTHER STN: NORMAL
PATH REPORT.MICROSCOPIC SPEC OTHER STN: NORMAL
PATH REPORT.RELEVANT HX SPEC: NORMAL
PLATELET # BLD AUTO: 72 10E3/UL (ref 150–450)
POTASSIUM SERPL-SCNC: 3.7 MMOL/L (ref 3.4–5.3)
RBC # BLD AUTO: 3.89 10E6/UL (ref 3.8–5.2)
SODIUM SERPL-SCNC: 135 MMOL/L (ref 135–145)
WBC # BLD AUTO: 5.6 10E3/UL (ref 4–11)

## 2024-09-03 PROCEDURE — 85041 AUTOMATED RBC COUNT: CPT | Performed by: HOSPITALIST

## 2024-09-03 PROCEDURE — 85060 BLOOD SMEAR INTERPRETATION: CPT | Performed by: PATHOLOGY

## 2024-09-03 PROCEDURE — 36415 COLL VENOUS BLD VENIPUNCTURE: CPT | Performed by: HOSPITALIST

## 2024-09-03 PROCEDURE — 250N000013 HC RX MED GY IP 250 OP 250 PS 637: Performed by: INTERNAL MEDICINE

## 2024-09-03 PROCEDURE — 250N000011 HC RX IP 250 OP 636: Performed by: HOSPITALIST

## 2024-09-03 PROCEDURE — 258N000003 HC RX IP 258 OP 636: Performed by: HOSPITALIST

## 2024-09-03 PROCEDURE — 250N000013 HC RX MED GY IP 250 OP 250 PS 637: Performed by: HOSPITALIST

## 2024-09-03 PROCEDURE — 120N000001 HC R&B MED SURG/OB

## 2024-09-03 PROCEDURE — 99232 SBSQ HOSP IP/OBS MODERATE 35: CPT | Performed by: HOSPITALIST

## 2024-09-03 PROCEDURE — 250N000012 HC RX MED GY IP 250 OP 636 PS 637: Performed by: INTERNAL MEDICINE

## 2024-09-03 PROCEDURE — 99232 SBSQ HOSP IP/OBS MODERATE 35: CPT | Performed by: PHYSICIAN ASSISTANT

## 2024-09-03 PROCEDURE — 80048 BASIC METABOLIC PNL TOTAL CA: CPT | Performed by: HOSPITALIST

## 2024-09-03 RX ADMIN — FUROSEMIDE 40 MG: 40 TABLET ORAL at 09:19

## 2024-09-03 RX ADMIN — HYDRALAZINE HYDROCHLORIDE 10 MG: 20 INJECTION INTRAMUSCULAR; INTRAVENOUS at 21:42

## 2024-09-03 RX ADMIN — SODIUM CHLORIDE: 9 INJECTION, SOLUTION INTRAVENOUS at 00:17

## 2024-09-03 RX ADMIN — FAMOTIDINE 10 MG: 10 TABLET, FILM COATED ORAL at 21:29

## 2024-09-03 RX ADMIN — ESCITALOPRAM OXALATE 10 MG: 10 TABLET ORAL at 21:29

## 2024-09-03 RX ADMIN — DEXAMETHASONE 40 MG: 4 TABLET ORAL at 09:32

## 2024-09-03 RX ADMIN — GLIPIZIDE 2.5 MG: 2.5 TABLET, FILM COATED, EXTENDED RELEASE ORAL at 17:40

## 2024-09-03 RX ADMIN — FAMOTIDINE 10 MG: 10 TABLET, FILM COATED ORAL at 09:19

## 2024-09-03 RX ADMIN — CEFTRIAXONE 1 G: 1 INJECTION, POWDER, FOR SOLUTION INTRAMUSCULAR; INTRAVENOUS at 17:40

## 2024-09-03 RX ADMIN — Medication 1 MG: at 00:50

## 2024-09-03 RX ADMIN — GLIPIZIDE 2.5 MG: 2.5 TABLET, FILM COATED, EXTENDED RELEASE ORAL at 09:19

## 2024-09-03 ASSESSMENT — ACTIVITIES OF DAILY LIVING (ADL)
ADLS_ACUITY_SCORE: 41
ADLS_ACUITY_SCORE: 41
ADLS_ACUITY_SCORE: 45
ADLS_ACUITY_SCORE: 40
ADLS_ACUITY_SCORE: 45
ADLS_ACUITY_SCORE: 41
ADLS_ACUITY_SCORE: 45
ADLS_ACUITY_SCORE: 46
ADLS_ACUITY_SCORE: 45
ADLS_ACUITY_SCORE: 41
ADLS_ACUITY_SCORE: 46
ADLS_ACUITY_SCORE: 40
ADLS_ACUITY_SCORE: 45

## 2024-09-03 NOTE — CONSULTS
Discharge Pharmacy Test Claim    Patient's [facility] Medicare Part D prepaid medical assistance advantage plan covers Eliquis. Patient has not yet met their deductible for their plan. Initial copay as well as expected monthly copay after deductible is met are listed below.    Test Claim Initial Copay Copay after Deductible is met   Eliquis 282.00 47.00       [facility] has one-time use 30-day free trial vouchers available for eliquis or xarelto.      Completed on behalf of Sabrina Leon 9/03/2024    Zora Chaney  [facility] Liaison  Phone: [phone] Fax: [phone]  Available on Teams & Vocyogi

## 2024-09-03 NOTE — PLAN OF CARE
"A&Ox4. VSS ex: elevated BP.   On RA.   External cath in place.   Modcho diet.   BG ACHS with carb count.   Fluids discontinued this am.   Denies pain, CP or SOB.  A1W.  Right PIV SL.   Spiritual health consulted.   PT, Hematology/oncology following.   Waiting for TCU placement.         Goal Outcome Evaluation:      Plan of Care Reviewed With: patient    Overall Patient Progress: improvingOverall Patient Progress: improving    Outcome Evaluation: A&Ox4. Waiting for placement.      Problem: Adult Inpatient Plan of Care  Goal: Plan of Care Review  Description: The Plan of Care Review/Shift note should be completed every shift.  The Outcome Evaluation is a brief statement about your assessment that the patient is improving, declining, or no change.  This information will be displayed automatically on your shift  note.  Outcome: Progressing  Flowsheets (Taken 9/3/2024 1854)  Outcome Evaluation: A&Ox4. Waiting for placement.  Plan of Care Reviewed With: patient  Overall Patient Progress: improving  Goal: Patient-Specific Goal (Individualized)  Description: You can add care plan individualizations to a care plan. Examples of Individualization might be:  \"Parent requests to be called daily at 9am for status\", \"I have a hard time hearing out of my right ear\", or \"Do not touch me to wake me up as it startles  me\".  Outcome: Progressing  Goal: Absence of Hospital-Acquired Illness or Injury  Outcome: Progressing  Intervention: Identify and Manage Fall Risk  Flowsheets  Taken 9/3/2024 1854  Safety Promotion/Fall Prevention: safety round/check completed  Taken 9/3/2024 0938  Safety Promotion/Fall Prevention: safety round/check completed  Intervention: Prevent Skin Injury  Flowsheets  Taken 9/3/2024 1854  Body Position: position changed independently  Skin Protection:   adhesive use limited   transparent dressing maintained  Device Skin Pressure Protection:   absorbent pad utilized/changed   adhesive use limited   tubing/devices " free from skin contact  Taken 9/3/2024 0938  Body Position: position changed independently  Skin Protection:   adhesive use limited   transparent dressing maintained  Device Skin Pressure Protection:   absorbent pad utilized/changed   adhesive use limited   tubing/devices free from skin contact  Taken 9/3/2024 0929  Body Position: position changed independently  Intervention: Prevent and Manage VTE (Venous Thromboembolism) Risk  Flowsheets  Taken 9/3/2024 1854  VTE Prevention/Management: SCDs off (sequential compression devices)  Taken 9/3/2024 0938  VTE Prevention/Management: SCDs off (sequential compression devices)  Intervention: Prevent Infection  Flowsheets  Taken 9/3/2024 1854  Infection Prevention:   single patient room provided   rest/sleep promoted   hand hygiene promoted  Taken 9/3/2024 0938  Infection Prevention:   single patient room provided   rest/sleep promoted   hand hygiene promoted  Goal: Optimal Comfort and Wellbeing  Outcome: Progressing  Intervention: Monitor Pain and Promote Comfort  Flowsheets (Taken 9/3/2024 1854)  Pain Management Interventions: medication (see MAR)  Intervention: Provide Person-Centered Care  Flowsheets (Taken 9/3/2024 1854)  Trust Relationship/Rapport:   care explained   choices provided   emotional support provided   empathic listening provided   questions answered   questions encouraged   thoughts/feelings acknowledged  Goal: Readiness for Transition of Care  Outcome: Progressing     Problem: Fluid Volume Deficit  Goal: Fluid Balance  Outcome: Progressing  Intervention: Monitor and Manage Hypovolemia  Flowsheets  Taken 9/3/2024 1854  Fluid/Electrolyte Management: fluids provided  Taken 9/3/2024 0938  Fluid/Electrolyte Management: fluids provided     Problem: Comorbidity Management  Goal: Maintenance of Heart Failure Symptom Control  Outcome: Progressing  Intervention: Maintain Heart Failure Management  Flowsheets  Taken 9/3/2024 1854  Medication Review/Management:  medications reviewed  Taken 9/3/2024 0938  Medication Review/Management: medications reviewed  Goal: Blood Glucose Levels Within Targeted Range  Outcome: Progressing  Intervention: Monitor and Manage Glycemia  Flowsheets  Taken 9/3/2024 1854  Glycemic Management:   blood glucose monitored   carbohydrate replacement provided   supplemental insulin given  Medication Review/Management: medications reviewed  Taken 9/3/2024 0938  Glycemic Management:   blood glucose monitored   carbohydrate replacement provided   supplemental insulin given  Medication Review/Management: medications reviewed     Problem: Bleeding Risk or Actual (Thrombocytopenia)  Goal: Absence of Bleeding  Outcome: Progressing  Intervention: Minimize Bleeding Risk  Flowsheets  Taken 9/3/2024 1854  Bleeding Precautions: monitored for signs of bleeding  Taken 9/3/2024 0938  Bleeding Precautions: monitored for signs of bleeding

## 2024-09-03 NOTE — CONSULTS
"SPIRITUAL HEALTH SERVICES - Consult Note  [facility] Med/Surg 3  Referral Source/Reason for Visit: [facility] consult    Summary and Recommendations -  Pt Savanna shared that she has a good support network and relies on her jose during difficult time.  She values prayer and communion.  Savanna is Jewish and affiliated with [employer] [facility] the [facility].  She has already contacted her Voodoo.    Plan: Informed pt how she can request further  support.  This author and other chaplains remain available per pt/family request.     Marc Broussard M.Div., [facility]  Staff     SHS available  for emergent requests/referrals, either by paging the on-call  or by entering an ASAP/STAT consult in [facility], which will also page the on-call .    Assessment    Saw pt Savanna PRASANNA Rehman per [facility] consult to assess spiritual and emotional needs because patient responded \"Yes\" to the question in the admission assessment, \"Do you have any spiritual or Islam beliefs that will affect your care?\"  Savanna's narrative was occasionally confused.    Patient/Family Understanding of Illness and Goals of Care - Savanna did not mention why she was admitted but anticipates discharging to a TCU at the end of the week.    Distress and Loss - Savanna reported that she has no concerns today because she received communion.  However, she mentioned that her brother-in-law  about a year ago and that her spouse cared for him at the end of his life.    Strengths, Coping, and Resources - Savanna named her daughter, who lives in [place], and her PCA of seven years.  She shared that she also has a grandson.    Meaning, Beliefs, and Spirituality - Savanna is Jewish and affiliated with [employer] [facility] of the [facility].  She welcomed prayer.  Savanna expressed gratitude for receiving communion today.   "

## 2024-09-03 NOTE — PROGRESS NOTES
Hematology-Oncology Hospital Follow-up Note  [facility]     Today's Date: 09/03/24  Date of Admission:  8/30/2024  Reason for Consult: Thrombocytopenia, presumed ITP      ASSESSMENT/ PLAN :  Savanna Rehman is a 83 year old female with a fib on chronic anticoagulation, cirrhosis, CHF, CAD, DM2, fibromyalgia, GERD who is admitted with severe thrombocytopenia    - Baseline plt ~100K with underlying cirrhosis. Given abrupt drop presume cause of acute thrombocytopenia is ITP. No signs of lymphoproliferative disorder on imaging   - Unclear precipitating event out of recent initiation of Protonix. Protonix held, can do Pepcid/Zantac for GERD  - S/p IVIG 8/31/24 and 9/1/24  - S/p Dex 40mg x 4 days (8/31-9/3/24). No plan for further steroids at this time, though may need in future pending plt trend.  - Plt improving, today 72K  - Wait to restart anticoagulation until we ensure plt stable tomorrow 9/4/24  - Discussed option of doing DOAC over warfarin to hopefully minimize risk of supra therapeutic levels/keep consist dosing. She has allergy to Xarelto. Sent message to pharmacy lesion to see about Eliquis cost. Conversely warfarin can be reversed if bleeding complications, so pending cost it may be simpler to keep on warfarin. Will hold off on starting for now   - Appreciate primary team management of steroid induced hyperglycemia, volume overload, encephalopathy (thought 2/2 steroids vs infection) and possible UTI (on abx)    Recommendations  - Daily CBC monitoring  - No further IVIG or steroids at this time  - Hold anticoagulation one more day to ensure plt stable  - Will investigate cost of switching to Eliquis     Discussed with Dr. Shelton. We will continue to follow.    INTERIM HISTORY:  Savanna is happy to hear her platelets have improved. She does have scant bleeding from vaginal tissue. She denies any other bleeding. Has significant bruising. She is tangential during discussion.     "  MEDICATIONS:  Reviewed         PHYSICAL EXAM:  Vital signs:  Temp: 98.1  F (36.7  C) Temp src: Oral BP: (!) 164/87 Pulse: 69   Resp: 18 SpO2: 92 % O2 Device: None (Room air) Oxygen Delivery: 1/2 LPM Height: 172.7 cm (5' 8\") Weight: 103.5 kg (228 lb 2.8 oz)  Estimated body mass index is 34.69 kg/m  as calculated from the following:    Height as of this encounter: 1.727 m (5' 8\").    Weight as of this encounter: 103.5 kg (228 lb 2.8 oz).      GENERAL/CONSTITUTIONAL: No acute distress.  RESPIRATORY: Clear to auscultation bilaterally. No crackles or wheezing.   CARDIOVASCULAR: Regular rate and rhythm without murmurs, gallops, or rubs.  GASTROINTESTINAL: No hepatosplenomegaly, masses, or tenderness. The patient has normal bowel sounds. No guarding.  No distention.  MUSCULOSKELETAL: Warm and well-perfused, no cyanosis, clubbing, or edema.    LABS:  Recent Labs   Lab Test 09/03/24  0747 09/03/24  0652   NA  --  135   POTASSIUM  --  3.7   CHLORIDE  --  99   CO2  --  25   ANIONGAP  --  11   BUN  --  38.6*   CR  --  0.95   * 230*   SAUL  --  8.9     Recent Labs   Lab Test 09/03/24  0652 09/02/24  0739 09/01/24  1120 09/01/24  0615 08/31/24  0714 08/30/24  1718   WBC 5.6 7.2   < > 3.1*   < > 5.3   HGB 12.0 11.4*   < > 12.8   < > 12.7   PLT 72* 51*   < > 17*   < > 2*   MCV 92 89   < > 89   < > 92   NEUTROPHIL  --   --   --  75  --  54    < > = values in this interval not displayed.     Recent Labs   Lab Test 08/31/24  0714 08/30/24  1718   BILITOTAL 1.9* 1.2   ALKPHOS 113 135   ALT 30 34   AST 46* 40   ALBUMIN 3.4* 3.8         Brooks Mays PA-C  Department of Hematology and Oncology  [facility]   Pager [phone]    "

## 2024-09-03 NOTE — PLAN OF CARE
"Goal Outcome Evaluation:      Plan of Care Reviewed With: patient    Overall Patient Progress: no changeOverall Patient Progress: no change    Outcome Evaluation: A&Ox4. VSS on RA. Denies pain & sob. NS infusing. . Plan to discharge to TCU.      Problem: Adult Inpatient Plan of Care  Goal: Plan of Care Review  Description: The Plan of Care Review/Shift note should be completed every shift.  The Outcome Evaluation is a brief statement about your assessment that the patient is improving, declining, or no change.  This information will be displayed automatically on your shift  note.  Outcome: Progressing  Flowsheets (Taken 9/3/2024 0807)  Outcome Evaluation: A&Ox4. VSS on RA. Denies pain & sob. NS infusing. . Plan to discharge to TCU.  Plan of Care Reviewed With: patient  Overall Patient Progress: no change  Goal: Patient-Specific Goal (Individualized)  Description: You can add care plan individualizations to a care plan. Examples of Individualization might be:  \"Parent requests to be called daily at 9am for status\", \"I have a hard time hearing out of my right ear\", or \"Do not touch me to wake me up as it startles  me\".  Outcome: Progressing  Goal: Absence of Hospital-Acquired Illness or Injury  Outcome: Progressing  Intervention: Identify and Manage Fall Risk  Recent Flowsheet Documentation  Taken 9/3/2024 0219 by Alana Bentley RN  Safety Promotion/Fall Prevention:   safety round/check completed   nonskid shoes/slippers when out of bed   assistive device/personal items within reach  Goal: Optimal Comfort and Wellbeing  Outcome: Progressing  Goal: Readiness for Transition of Care  Outcome: Progressing     Problem: Fluid Volume Deficit  Goal: Fluid Balance  Outcome: Progressing     Problem: Comorbidity Management  Goal: Maintenance of Heart Failure Symptom Control  Outcome: Progressing  Goal: Blood Glucose Levels Within Targeted Range  Outcome: Progressing     Problem: Bleeding Risk or Actual " (Thrombocytopenia)  Goal: Absence of Bleeding  Outcome: Progressing

## 2024-09-03 NOTE — PLAN OF CARE
"Care provided from 19:00 to 23:30. Pt AOx4, minimal int confusion. BG elev 281, sliding scale per orders. IV NS 100mL/hr. Voiding incontinence, purewick placed for overnight.     Goal Outcome Evaluation:      Plan of Care Reviewed With: patient    Overall Patient Progress: no changeOverall Patient Progress: no change    Outcome Evaluation: Pt AOx4, cooperative, int confusion. NS infusing 100mL/hr. .      Problem: Adult Inpatient Plan of Care  Goal: Plan of Care Review  Description: The Plan of Care Review/Shift note should be completed every shift.  The Outcome Evaluation is a brief statement about your assessment that the patient is improving, declining, or no change.  This information will be displayed automatically on your shift  note.  Outcome: Progressing  Flowsheets (Taken 9/2/2024 2256)  Outcome Evaluation: Pt AOx4, cooperative, int confusion. NS infusing 100mL/hr. .  Plan of Care Reviewed With: patient  Overall Patient Progress: no change  Goal: Patient-Specific Goal (Individualized)  Description: You can add care plan individualizations to a care plan. Examples of Individualization might be:  \"Parent requests to be called daily at 9am for status\", \"I have a hard time hearing out of my right ear\", or \"Do not touch me to wake me up as it startles  me\".  Outcome: Progressing  Goal: Absence of Hospital-Acquired Illness or Injury  Outcome: Progressing  Intervention: Identify and Manage Fall Risk  Recent Flowsheet Documentation  Taken 9/2/2024 1900 by Harshil Newby, RN  Safety Promotion/Fall Prevention:   safety round/check completed   supervised activity   room organization consistent   room door open   mobility aid in reach  Intervention: Prevent Skin Injury  Recent Flowsheet Documentation  Taken 9/2/2024 1900 by Harshil Newby, RN  Device Skin Pressure Protection: absorbent pad utilized/changed  Goal: Optimal Comfort and Wellbeing  Outcome: Progressing  Goal: Readiness for Transition of " Care  Outcome: Progressing     Problem: Fluid Volume Deficit  Goal: Fluid Balance  Outcome: Progressing     Problem: Comorbidity Management  Goal: Maintenance of Heart Failure Symptom Control  Outcome: Progressing  Intervention: Maintain Heart Failure Management  Recent Flowsheet Documentation  Taken 9/2/2024 1900 by Harshil Newby RN  Medication Review/Management: medications reviewed  Goal: Blood Glucose Levels Within Targeted Range  Outcome: Progressing  Intervention: Monitor and Manage Glycemia  Recent Flowsheet Documentation  Taken 9/2/2024 1900 by Harshil Newby RN  Medication Review/Management: medications reviewed     Problem: Bleeding Risk or Actual (Thrombocytopenia)  Goal: Absence of Bleeding  Outcome: Progressing     Problem: Comorbidity Management  Goal: Maintenance of Asthma Control  Intervention: Maintain Asthma Symptom Control  Recent Flowsheet Documentation  Taken 9/2/2024 1900 by Harshil Nebwy RN  Medication Review/Management: medications reviewed  Goal: Bariatric Home Regimen Maintained  Intervention: Maintain and Manage Postbariatric Surgery Care  Recent Flowsheet Documentation  Taken 9/2/2024 1900 by Harshil Newby RN  Medication Review/Management: medications reviewed

## 2024-09-03 NOTE — PROGRESS NOTES
[facility]    Medicine Progress Note - Hospitalist Service    Date of Admission:  8/30/2024    Assessment & Plan   Savanna Rehman is a 83 year old female admitted on 8/30/2024. She has a past medical history significant for atrial fibrillation, cirrhosis, congestive heart failure, coronary artery disease, diabetes mellitus type 2, fibromyalgia, GERD, gout, and sleep apnea.  She began noticing very small little red spots on her arms, legs, and abdomen starting 2 days ago.  Seems to be getting more of the spots.  Presented to emergency room for further evaluation.  She has also been noticing some bright red blood from either vaginal or rectal bleeding.  She has not completely sure which area or possibly both areas this is coming from.  Was found to have severe thrombocytopenia on lab testing.    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 Baseline appears to be around 95-130k  -given 3 units of Platelets with minimal improvement  -unclear if PPI/cirrhosis/coumadin are contributing. Changed to pepcid, cont to hold coumadin  -seen by hematology, started on IVIG and decadron on 8/31 (plan for 4 day course) with improvement to 72k today.   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 Doesn't even meet SIRS criteria so doubt this is sepsis and procal is negative but monitor closely  -err on side of caution while on steroids/IVIG and start on Rocephin for possible UTI  -fluids stopped  -urine culture pending    Diabetes mellitus type 2, poorly controlled  -Aspart insulin  sliding scale as needed.  -Hold glipizide  -increase lantus to 20 BID, high dose ISS    Cirrhosis  Volume overload  -Follows with [facility], on lasix outpt    Sleep apnea.  -Wear CPAP while sleeping.    Atrial fibrillation.  Drug-induced coagulation defect.  Coronary artery disease.  Chronic heart failure with preserved ejection fraction.  -Hold warfarin.  -some fluid overload from IVIG improved after IV lasix x1  -cont home lasix 40 oral, currently appears euvolemic    GERD/esophagitis  -had recent EGD, was on protonix but holding due to thrombocytopenia  -on pepcid now    Chronic kidney disease stage IIIa.  -Creatinine appears to be at baseline.        Diet: Moderate Consistent Carb (60 g CHO per Meal) Diet    DVT Prophylaxis: Pneumatic Compression Devices  Aguilar Catheter: Not present  Lines: None     Cardiac Monitoring: None  Code Status: No CPR- Pre-arrest intubation OK         Disposition Plan     Medically Ready for Discharge: at least another 1-2 days, will need TCU         Marc Abdul DO  Hospitalist Service  [facility]  Securely message with [employer] (more info)  Text page via [employer] Paging/Directory   ______________________________________________________________________    Interval History   No overnight events, platelets continue to improve daily. No fever or chills, has external catheter in place so denies any dysuria. Urine cx pending    Physical Exam   Vital Signs: Temp: 98.1  F (36.7  C) Temp src: Oral BP: (!) 164/87 Pulse: 69   Resp: 18 SpO2: 92 % O2 Device: None (Room air)    Weight: 228 lbs 2.82 oz  Constitutional: awake, alert, and cooperative  Eyes: pupils equal, round and reactive to light and conjunctiva normal  ENT: normocephalic, without obvious abnormality, atraumatic  Respiratory: no increased work of breathing, good air exchange, and clear to auscultation  Cardiovascular: regular rate and rhythm and no murmur noted  GI: normal bowel sounds, soft, and  non-distended  Skin: scattered bruising on extremities and worse on arms, no rashes  Neurologic: alert, interactive, fluent speech with no focal deficits    45 MINUTES SPENT BY ME on the date of service doing chart review, history, exam, documentation & further activities per the note.      Data   ------------------------- PAST 24 HR DATA REVIEWED -----------------------------------------------    I have personally reviewed the following data over the past 24 hrs:    5.6  \   12.0   / 72 (L)     135 99 38.6 (H) /  218 (H)   3.7 25 0.95 \       Imaging results reviewed over the past 24 hrs:   No results found for this or any previous visit (from the past 24 hour(s)).

## 2024-09-04 ENCOUNTER — APPOINTMENT (OUTPATIENT)
Dept: PHYSICAL THERAPY | Facility: CLINIC | Age: 84
DRG: 813 | End: 2024-09-04
Payer: COMMERCIAL

## 2024-09-04 ENCOUNTER — DOCUMENTATION ONLY (OUTPATIENT)
Dept: ANTICOAGULATION | Facility: CLINIC | Age: 84
End: 2024-09-04
Payer: COMMERCIAL

## 2024-09-04 DIAGNOSIS — D69.3 IDIOPATHIC THROMBOCYTOPENIC PURPURA (H): Primary | ICD-10-CM

## 2024-09-04 LAB
BACTERIA UR CULT: ABNORMAL
BACTERIA UR CULT: ABNORMAL
ERYTHROCYTE [DISTWIDTH] IN BLOOD BY AUTOMATED COUNT: 13.8 % (ref 10–15)
GLUCOSE BLDC GLUCOMTR-MCNC: 131 MG/DL (ref 70–99)
GLUCOSE BLDC GLUCOMTR-MCNC: 141 MG/DL (ref 70–99)
GLUCOSE BLDC GLUCOMTR-MCNC: 144 MG/DL (ref 70–99)
GLUCOSE BLDC GLUCOMTR-MCNC: 159 MG/DL (ref 70–99)
GLUCOSE BLDC GLUCOMTR-MCNC: 206 MG/DL (ref 70–99)
HCT VFR BLD AUTO: 37.9 % (ref 35–47)
HGB BLD-MCNC: 13.2 G/DL (ref 11.7–15.7)
MCH RBC QN AUTO: 31.1 PG (ref 26.5–33)
MCHC RBC AUTO-ENTMCNC: 34.8 G/DL (ref 31.5–36.5)
MCV RBC AUTO: 89 FL (ref 78–100)
PLATELET # BLD AUTO: 123 10E3/UL (ref 150–450)
RBC # BLD AUTO: 4.24 10E6/UL (ref 3.8–5.2)
WBC # BLD AUTO: 5.6 10E3/UL (ref 4–11)

## 2024-09-04 PROCEDURE — 250N000013 HC RX MED GY IP 250 OP 250 PS 637: Performed by: HOSPITALIST

## 2024-09-04 PROCEDURE — 250N000013 HC RX MED GY IP 250 OP 250 PS 637: Performed by: INTERNAL MEDICINE

## 2024-09-04 PROCEDURE — 99232 SBSQ HOSP IP/OBS MODERATE 35: CPT | Performed by: HOSPITALIST

## 2024-09-04 PROCEDURE — 36415 COLL VENOUS BLD VENIPUNCTURE: CPT | Performed by: HOSPITALIST

## 2024-09-04 PROCEDURE — 99232 SBSQ HOSP IP/OBS MODERATE 35: CPT | Performed by: PHYSICIAN ASSISTANT

## 2024-09-04 PROCEDURE — 250N000011 HC RX IP 250 OP 636: Performed by: HOSPITALIST

## 2024-09-04 PROCEDURE — 97116 GAIT TRAINING THERAPY: CPT | Mod: GP | Performed by: PHYSICAL THERAPIST

## 2024-09-04 PROCEDURE — 120N000001 HC R&B MED SURG/OB

## 2024-09-04 PROCEDURE — 97530 THERAPEUTIC ACTIVITIES: CPT | Mod: GP | Performed by: PHYSICAL THERAPIST

## 2024-09-04 PROCEDURE — 85027 COMPLETE CBC AUTOMATED: CPT | Performed by: HOSPITALIST

## 2024-09-04 RX ORDER — HYDROXYZINE HYDROCHLORIDE 25 MG/1
25 TABLET, FILM COATED ORAL EVERY 6 HOURS PRN
Status: DISCONTINUED | OUTPATIENT
Start: 2024-09-04 | End: 2024-09-07 | Stop reason: HOSPADM

## 2024-09-04 RX ORDER — HYDROXYZINE HYDROCHLORIDE 50 MG/1
50 TABLET, FILM COATED ORAL EVERY 6 HOURS PRN
Status: DISCONTINUED | OUTPATIENT
Start: 2024-09-04 | End: 2024-09-07 | Stop reason: HOSPADM

## 2024-09-04 RX ADMIN — ESCITALOPRAM OXALATE 10 MG: 10 TABLET ORAL at 21:19

## 2024-09-04 RX ADMIN — FAMOTIDINE 10 MG: 10 TABLET, FILM COATED ORAL at 08:10

## 2024-09-04 RX ADMIN — FAMOTIDINE 10 MG: 10 TABLET, FILM COATED ORAL at 21:19

## 2024-09-04 RX ADMIN — APIXABAN 5 MG: 5 TABLET, FILM COATED ORAL at 21:19

## 2024-09-04 RX ADMIN — HYDROXYZINE HYDROCHLORIDE 25 MG: 25 TABLET ORAL at 10:07

## 2024-09-04 RX ADMIN — GLIPIZIDE 2.5 MG: 2.5 TABLET, FILM COATED, EXTENDED RELEASE ORAL at 08:10

## 2024-09-04 RX ADMIN — CEFTRIAXONE 1 G: 1 INJECTION, POWDER, FOR SOLUTION INTRAMUSCULAR; INTRAVENOUS at 16:01

## 2024-09-04 RX ADMIN — HYDRALAZINE HYDROCHLORIDE 10 MG: 20 INJECTION INTRAMUSCULAR; INTRAVENOUS at 08:13

## 2024-09-04 RX ADMIN — GLIPIZIDE 2.5 MG: 2.5 TABLET, FILM COATED, EXTENDED RELEASE ORAL at 16:00

## 2024-09-04 RX ADMIN — FUROSEMIDE 40 MG: 40 TABLET ORAL at 08:10

## 2024-09-04 RX ADMIN — Medication 1 MG: at 21:19

## 2024-09-04 ASSESSMENT — ACTIVITIES OF DAILY LIVING (ADL)
ADLS_ACUITY_SCORE: 44
ADLS_ACUITY_SCORE: 40
ADLS_ACUITY_SCORE: 48
ADLS_ACUITY_SCORE: 40
ADLS_ACUITY_SCORE: 44
ADLS_ACUITY_SCORE: 48
ADLS_ACUITY_SCORE: 40
ADLS_ACUITY_SCORE: 48
ADLS_ACUITY_SCORE: 48
ADLS_ACUITY_SCORE: 40
ADLS_ACUITY_SCORE: 48
ADLS_ACUITY_SCORE: 40
ADLS_ACUITY_SCORE: 48
ADLS_ACUITY_SCORE: 48
ADLS_ACUITY_SCORE: 40

## 2024-09-04 NOTE — PROGRESS NOTES
Hematology-Oncology Hospital Follow-up Note  [facility]     Today's Date: 09/04/24  Date of Admission:  8/30/2024  Reason for Consult: Thrombocytopenia, presumed ITP        ASSESSMENT/ PLAN :  Savanna Rehman is a 83 year old female with a fib on chronic anticoagulation, cirrhosis, CHF, CAD, DM2, fibromyalgia, GERD who is admitted with severe thrombocytopenia     - Baseline plt ~100K with underlying cirrhosis. Given abrupt drop presume cause of acute thrombocytopenia is ITP. No signs of lymphoproliferative disorder on imaging   - Unclear precipitating event out of recent initiation of Protonix. Protonix held, can do Pepcid/Zantac for GERD  - S/p IVIG 8/31/24 and 9/1/24  - S/p Dex 40mg x 4 days (8/31-9/3/24). No plan for further steroids at this time, though may need in future pending plt trend.  - Plt improving, today 123K  - Spoke with patient and she feels Eliquis cost would be manageable, especially if we are able to get first month free per pharmacy note. Eliquis is easier to start/stop if plt drop again and would be easier to maintain consistent levels  - Would recommend starting Eliquis 5mg BID in place of warfarin. OK to start today  - Per my discussion with pharmacy low risk of allergic reaction but will need to monitor given history of rash with Xarelto   - Appreciate primary team management of steroid induced hyperglycemia, volume overload, encephalopathy (thought 2/2 steroids vs infection) and possible UTI (on abx)     Recommendations  - Daily CBC monitoring  - No further IVIG or steroids at this time  - Start Eliquis 5mg BID instead of Warfarin   - Will need weekly CBC monitoring in hematology clinic which we will arrange     Discussed with Dr. Conrad and primary team. We will continue to follow labs while admitted and arrange outpatient follow-up.     INTERIM HISTORY:  Savanna is feeling well, though still feels somewhat confused. She denies any bleeding. We reviewed warfarin vs  "Eliquis and she is agreeable the Eliquis is a safer option as we can quickly start and stop along with easier to keep consistent levels. She spoke with  and cost is manageable.      MEDICATIONS:  Reviewed         PHYSICAL EXAM:  Vital signs:  Temp: 97.3  F (36.3  C) Temp src: Oral BP: (!) 171/81 Pulse: 88   Resp: 18 SpO2: 94 % O2 Device: None (Room air) Oxygen Delivery: 1/2 LPM Height: 172.7 cm (5' 8\") Weight: 105.6 kg (232 lb 12.8 oz)  Estimated body mass index is 35.4 kg/m  as calculated from the following:    Height as of this encounter: 1.727 m (5' 8\").    Weight as of this encounter: 105.6 kg (232 lb 12.8 oz).      GENERAL/CONSTITUTIONAL: No acute distress.  MUSCULOSKELETAL: Diffuse bruising noted     LABS:  Recent Labs   Lab Test 09/04/24  0725 09/03/24  0747 09/03/24  0652   NA  --   --  135   POTASSIUM  --   --  3.7   CHLORIDE  --   --  99   CO2  --   --  25   ANIONGAP  --   --  11   BUN  --   --  38.6*   CR  --   --  0.95   *   < > 230*   SAUL  --   --  8.9    < > = values in this interval not displayed.     Recent Labs   Lab Test 09/04/24  0638 09/03/24  0652 09/01/24  1120 09/01/24  0615 08/31/24  0714 08/30/24  1718   WBC 5.6 5.6   < > 3.1*   < > 5.3   HGB 13.2 12.0   < > 12.8   < > 12.7   * 72*   < > 17*   < > 2*   MCV 89 92   < > 89   < > 92   NEUTROPHIL  --   --   --  75  --  54    < > = values in this interval not displayed.     Recent Labs   Lab Test 08/31/24  0714 08/30/24  1718   BILITOTAL 1.9* 1.2   ALKPHOS 113 135   ALT 30 34   AST 46* 40   ALBUMIN 3.4* 3.8       Brooks Mays PA-C  Department of Hematology and Oncology  [facility]   Pager [phone]    " [employer]

## 2024-09-04 NOTE — TELEPHONE ENCOUNTER
Dary calling from [facility] to inform [facility] the patient declined having home care see her or check her INR.     Please call the patient's  on Tuesday 7/18/23 with dosing and follow up.    : Zee LEVI at [phone]    ANTICOAGULATION MANAGEMENT     Savanna Rehman 82 year old female is on warfarin with subtherapeutic INR result. (Goal INR )    Recent labs: (last 7 days)     07/13/23  1854   INR 1.53*       ASSESSMENT       Source(s): Chart Review and Patient/Caregiver Call       Warfarin doses taken: Less warfarin taken than planned which may be affecting INR    Diet: No new diet changes identified    Medication/supplement changes: None noted    New illness, injury, or hospitalization: Yes: ED visit yesterday for bruise on bottom    Signs or symptoms of bleeding or clotting: No    Previous result: Subtherapeutic    Additional findings: None         PLAN     Recommended plan for temporary change(s) affecting INR     Dosing Instructions: booster dose then continue your current warfarin dose with next INR in 4 days       Summary  As of 7/14/2023    Full warfarin instructions:  7/14: 5 mg; Otherwise 2.5 mg every Mon, Thu, Sat; 3.75 mg all other days   Next INR check:  7/18/2023             Telephone call with Savanna who agrees to plan and repeated back plan correctly.  Called Dary with [facility] and advised of dosing instructions.      Check at provider office visit    Education provided:     Please call back if any changes to your diet, medications or how you've been taking warfarin    Plan made per [facility] anticoagulation protocol    Roya Sky, RN  Anticoagulation Clinic  7/14/2023    _______________________________________________________________________     Anticoagulation Episode Summary     Current INR goal:  2.0-3.0   TTR:  55.5 % (1 y)   Target end date:  Indefinite   Send INR reminders to:  NIKKI CHASE    Indications    Permanent atrial fibrillation (H)  Quality 226: Preventive Care And Screening: Tobacco Use: Screening And Cessation Intervention: Patient screened for tobacco use and is an ex/non-smoker [I48.21]  Chronic atrial fibrillation (H) [I48.20]  Long term (current) use of anticoagulants (Resolved) [Z79.01]           Comments:  Home care          Anticoagulation Care Providers     Provider Role Specialty Phone number    Patti Suárez MD Referring Internal Medicine [phone]           Detail Level: Detailed

## 2024-09-04 NOTE — PROGRESS NOTES
ANTICOAGULATION     Savanna Rehman is overdue for an INR check.     Patient currently inpatient at [facility].  Will postpone reminder one week.    Iris Kemp RN

## 2024-09-04 NOTE — PLAN OF CARE
"Goal Outcome Evaluation:      Plan of Care Reviewed With: patient    Overall Patient Progress: improvingOverall Patient Progress: improving    Pt A & O x 3. BP elevated , PRN hydralazine given with improvement to BP see flow sheet. , 206 correction given per sliding scale. Up with 1 gb/walker. Discharge pending TCU placement      Problem: Adult Inpatient Plan of Care  Goal: Plan of Care Review  Description: The Plan of Care Review/Shift note should be completed every shift.  The Outcome Evaluation is a brief statement about your assessment that the patient is improving, declining, or no change.  This information will be displayed automatically on your shift  note.  Outcome: Progressing  Flowsheets (Taken 9/4/2024 0318)  Plan of Care Reviewed With: patient  Overall Patient Progress: improving  Goal: Patient-Specific Goal (Individualized)  Description: You can add care plan individualizations to a care plan. Examples of Individualization might be:  \"Parent requests to be called daily at 9am for status\", \"I have a hard time hearing out of my right ear\", or \"Do not touch me to wake me up as it startles  me\".  Outcome: Progressing  Goal: Absence of Hospital-Acquired Illness or Injury  Outcome: Progressing  Intervention: Identify and Manage Fall Risk  Recent Flowsheet Documentation  Taken 9/3/2024 2130 by Sally Mccarthy RN  Safety Promotion/Fall Prevention: safety round/check completed  Intervention: Prevent Skin Injury  Recent Flowsheet Documentation  Taken 9/3/2024 2130 by Sally Mccarthy, RN  Body Position: position changed independently  Skin Protection:   adhesive use limited   transparent dressing maintained  Device Skin Pressure Protection:   absorbent pad utilized/changed   adhesive use limited   tubing/devices free from skin contact  Intervention: Prevent and Manage VTE (Venous Thromboembolism) Risk  Recent Flowsheet Documentation  Taken 9/3/2024 2130 by Sally Mccarthy, RN  VTE Prevention/Management: SCDs off " (sequential compression devices)  Intervention: Prevent Infection  Recent Flowsheet Documentation  Taken 9/3/2024 2130 by Sally Mccarthy, RN  Infection Prevention:   single patient room provided   rest/sleep promoted   hand hygiene promoted  Goal: Optimal Comfort and Wellbeing  Outcome: Progressing  Intervention: Provide Person-Centered Care  Recent Flowsheet Documentation  Taken 9/3/2024 2130 by Sally Mccarthy, RN  Trust Relationship/Rapport:   care explained   choices provided   emotional support provided   empathic listening provided   questions answered   questions encouraged   thoughts/feelings acknowledged  Goal: Readiness for Transition of Care  Outcome: Progressing

## 2024-09-04 NOTE — PROGRESS NOTES
[facility]    Medicine Progress Note - Hospitalist Service    Date of Admission:  8/30/2024    Assessment & Plan   Savanna Rehman is a 83 year old female admitted on 8/30/2024. She has a past medical history significant for atrial fibrillation, cirrhosis, congestive heart failure, coronary artery disease, diabetes mellitus type 2, fibromyalgia, GERD, gout, and sleep apnea.  She began noticing very small little red spots on her arms, legs, and abdomen starting 2 days ago.  Seems to be getting more of the spots.  Presented to emergency room for further evaluation.  She has also been noticing some bright red blood from either vaginal or rectal bleeding.  She has not completely sure which area or possibly both areas this is coming from.  Was found to have severe thrombocytopenia on lab testing.    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 Seems to have variable emotional states but not neurologic in origin    Klebsiella UTI  Lactic acidosis  -unclear etiology for lactic acidosis, could be related to steroids, hyperglycemia, cirrhosis but has abnormal UA. Doesn't even meet SIRS criteria so doubt this is sepsis and procal is negative but monitor closely  -urine cx showing klebseilla  -complete 3 days of IV Rocephin    Diabetes mellitus type 2, poorly  controlled  -Aspart insulin sliding scale as needed.  -resumed glipizide  -off steroids, decrease lantus 10 BID, high ISS    Cirrhosis  Volume overload  -Follows with [facility], on lasix outpt now resumed    Sleep apnea.  -Wear CPAP while sleeping.    Atrial fibrillation.  Drug-induced coagulation defect.  Coronary artery disease.  Chronic heart failure with preserved ejection fraction.  -Hold warfarin.  -some fluid overload from IVIG improved after IV lasix x1  -cont home lasix 40 oral, currently appears euvolemic    GERD/esophagitis  -had recent EGD, was on protonix but holding due to thrombocytopenia  -on pepcid now    Chronic kidney disease stage IIIa.  -Creatinine appears to be at baseline.        Diet: Moderate Consistent Carb (60 g CHO per Meal) Diet    DVT Prophylaxis: Pneumatic Compression Devices  Aguilar Catheter: Not present  Lines: None     Cardiac Monitoring: None  Code Status: No CPR- Pre-arrest intubation OK         Disposition Plan     Medically Ready for Discharge: Anticipated Tomorrow, anticipate TCU         Marc Abdul DO  Hospitalist Service  [facility]  Securely message with "[employer] (more info)  Text page via [employer] Paging/Directory   ______________________________________________________________________    Interval History   No overnight events, platelets are improving daily. Attempted to discuss with her switching to DOAC based on hematology recs but she doesn't seem to have understanding of what I'm saying despite multiple attempts so will likely just resume warfarin and have her follow-up with PCP. Did have reported anxiety attack this am but not particularly anxious when seen    Physical Exam   Vital Signs: Temp: 97.3  F (36.3  C) Temp src: Oral BP: (!) 171/81 Pulse: 88   Resp: 18 SpO2: 94 % O2 Device: None (Room air)    Weight: 232 lbs 12.8 oz  Constitutional: awake, alert, and cooperative  Eyes: pupils equal, round and reactive to light and conjunctiva normal  ENT:  normocephalic, without obvious abnormality, atraumatic  Respiratory: no increased work of breathing, good air exchange, and clear to auscultation  Cardiovascular: regular rate and rhythm and no murmur noted  GI: normal bowel sounds, soft, and non-distended  Skin: scattered bruising on extremities and worse on arms, no rashes  Neurologic: alert, interactive, fluent speech with no focal deficits    45 MINUTES SPENT BY ME on the date of service doing chart review, history, exam, documentation & further activities per the note.      Data   ------------------------- PAST 24 HR DATA REVIEWED -----------------------------------------------    I have personally reviewed the following data over the past 24 hrs:    5.6  \   13.2   / 123 (L)     N/A N/A N/A /  141 (H)   N/A N/A N/A \       Imaging results reviewed over the past 24 hrs:   No results found for this or any previous visit (from the past 24 hour(s)).

## 2024-09-04 NOTE — PLAN OF CARE
"Goal Outcome Evaluation:      Plan of Care Reviewed With: patient    Overall Patient Progress: improvingOverall Patient Progress: improving     Outcome Evaluation: A/o x 4. Forgetful. Hydralazine x1. LS diminished. Room air. BS checks. c/o of anxiety given Atarax with some relief. Awaiting placement.     Problem: Adult Inpatient Plan of Care  Goal: Plan of Care Review  Description: The Plan of Care Review/Shift note should be completed every shift.  The Outcome Evaluation is a brief statement about your assessment that the patient is improving, declining, or no change.  This information will be displayed automatically on your shift  note.  9/4/2024 1300 by Milena Burleson, RN  Outcome: Progressing  Flowsheets (Taken 9/4/2024 1300)  Outcome Evaluation: A/o x 4. Forgetful. c/o of anxiety given Atarax with some relief. Awaiting placement.  9/4/2024 1300 by Milena Burleson, RN  Outcome: Progressing  Flowsheets (Taken 9/4/2024 1300)  Outcome Evaluation: A/o x 4. Forgetful. c/o of anxiety given Atarax with some relief. Awaiting placement.  Plan of Care Reviewed With: patient  Overall Patient Progress: improving  Goal: Patient-Specific Goal (Individualized)  Description: You can add care plan individualizations to a care plan. Examples of Individualization might be:  \"Parent requests to be called daily at 9am for status\", \"I have a hard time hearing out of my right ear\", or \"Do not touch me to wake me up as it startles  me\".  9/4/2024 1300 by Milena Burleson RN  Outcome: Progressing  9/4/2024 1300 by Milena Burleson, RN  Outcome: Progressing  Goal: Absence of Hospital-Acquired Illness or Injury  9/4/2024 1300 by Milena Burleson, RN  Outcome: Progressing  9/4/2024 1300 by Milena Burleson, RN  Outcome: Progressing  Intervention: Identify and Manage Fall Risk  Recent Flowsheet Documentation  Taken 9/4/2024 0813 by Milena Burleson, CLIFFORD  Safety Promotion/Fall Prevention:   activity supervised   assistive device/personal items within reach   " clutter free environment maintained   nonskid shoes/slippers when out of bed   patient and family education   safety round/check completed   supervised activity  Intervention: Prevent Skin Injury  Recent Flowsheet Documentation  Taken 9/4/2024 0813 by Milena Burleson RN  Body Position: position changed independently  Goal: Optimal Comfort and Wellbeing  9/4/2024 1300 by Milena Burleson RN  Outcome: Progressing  9/4/2024 1300 by Milena Burleson RN  Outcome: Progressing  Goal: Readiness for Transition of Care  9/4/2024 1300 by Milena Burleson RN  Outcome: Progressing  9/4/2024 1300 by Milena Burleson RN  Outcome: Progressing     Problem: Fluid Volume Deficit  Goal: Fluid Balance  9/4/2024 1300 by Milena Burleson RN  Outcome: Progressing  9/4/2024 1300 by Milena Burleson RN  Outcome: Progressing     Problem: Comorbidity Management  Goal: Maintenance of Heart Failure Symptom Control  9/4/2024 1300 by Milena Burleson RN  Outcome: Progressing  9/4/2024 1300 by Milena Burleson RN  Outcome: Progressing  Intervention: Maintain Heart Failure Management  Recent Flowsheet Documentation  Taken 9/4/2024 0813 by Milena Burleson RN  Medication Review/Management: medications reviewed  Goal: Blood Glucose Levels Within Targeted Range  9/4/2024 1300 by Milena Burleson RN  Outcome: Progressing  9/4/2024 1300 by Milena Burleson RN  Outcome: Progressing  Intervention: Monitor and Manage Glycemia  Recent Flowsheet Documentation  Taken 9/4/2024 0813 by Milena Burleson RN  Medication Review/Management: medications reviewed     Problem: Bleeding Risk or Actual (Thrombocytopenia)  Goal: Absence of Bleeding  9/4/2024 1300 by Milena Burleson RN  Outcome: Progressing  9/4/2024 1300 by Milena Burleson RN  Outcome: Progressing

## 2024-09-05 ENCOUNTER — APPOINTMENT (OUTPATIENT)
Dept: PHYSICAL THERAPY | Facility: CLINIC | Age: 84
DRG: 813 | End: 2024-09-05
Payer: COMMERCIAL

## 2024-09-05 LAB
ERYTHROCYTE [DISTWIDTH] IN BLOOD BY AUTOMATED COUNT: 13.8 % (ref 10–15)
GLUCOSE BLDC GLUCOMTR-MCNC: 105 MG/DL (ref 70–99)
GLUCOSE BLDC GLUCOMTR-MCNC: 112 MG/DL (ref 70–99)
GLUCOSE BLDC GLUCOMTR-MCNC: 126 MG/DL (ref 70–99)
GLUCOSE BLDC GLUCOMTR-MCNC: 143 MG/DL (ref 70–99)
GLUCOSE BLDC GLUCOMTR-MCNC: 177 MG/DL (ref 70–99)
HCT VFR BLD AUTO: 40.3 % (ref 35–47)
HGB BLD-MCNC: 13.8 G/DL (ref 11.7–15.7)
MCH RBC QN AUTO: 30.6 PG (ref 26.5–33)
MCHC RBC AUTO-ENTMCNC: 34.2 G/DL (ref 31.5–36.5)
MCV RBC AUTO: 89 FL (ref 78–100)
PLATELET # BLD AUTO: 152 10E3/UL (ref 150–450)
RBC # BLD AUTO: 4.51 10E6/UL (ref 3.8–5.2)
WBC # BLD AUTO: 5.1 10E3/UL (ref 4–11)

## 2024-09-05 PROCEDURE — 97110 THERAPEUTIC EXERCISES: CPT | Mod: GP | Performed by: PHYSICAL THERAPIST

## 2024-09-05 PROCEDURE — 250N000013 HC RX MED GY IP 250 OP 250 PS 637: Performed by: INTERNAL MEDICINE

## 2024-09-05 PROCEDURE — 99232 SBSQ HOSP IP/OBS MODERATE 35: CPT | Performed by: HOSPITALIST

## 2024-09-05 PROCEDURE — 250N000013 HC RX MED GY IP 250 OP 250 PS 637: Performed by: HOSPITALIST

## 2024-09-05 PROCEDURE — 36415 COLL VENOUS BLD VENIPUNCTURE: CPT | Performed by: HOSPITALIST

## 2024-09-05 PROCEDURE — 85027 COMPLETE CBC AUTOMATED: CPT | Performed by: HOSPITALIST

## 2024-09-05 PROCEDURE — 120N000001 HC R&B MED SURG/OB

## 2024-09-05 RX ADMIN — APIXABAN 5 MG: 5 TABLET, FILM COATED ORAL at 20:20

## 2024-09-05 RX ADMIN — GLIPIZIDE 2.5 MG: 2.5 TABLET, FILM COATED, EXTENDED RELEASE ORAL at 17:33

## 2024-09-05 RX ADMIN — FUROSEMIDE 40 MG: 40 TABLET ORAL at 08:22

## 2024-09-05 RX ADMIN — ESCITALOPRAM OXALATE 10 MG: 10 TABLET ORAL at 21:57

## 2024-09-05 RX ADMIN — GLIPIZIDE 2.5 MG: 2.5 TABLET, FILM COATED, EXTENDED RELEASE ORAL at 08:22

## 2024-09-05 RX ADMIN — FAMOTIDINE 10 MG: 10 TABLET, FILM COATED ORAL at 20:20

## 2024-09-05 RX ADMIN — Medication 1 MG: at 21:57

## 2024-09-05 RX ADMIN — FAMOTIDINE 10 MG: 10 TABLET, FILM COATED ORAL at 08:22

## 2024-09-05 RX ADMIN — APIXABAN 5 MG: 5 TABLET, FILM COATED ORAL at 08:22

## 2024-09-05 ASSESSMENT — ACTIVITIES OF DAILY LIVING (ADL)
ADLS_ACUITY_SCORE: 44
ADLS_ACUITY_SCORE: 44
ADLS_ACUITY_SCORE: 48
ADLS_ACUITY_SCORE: 44
ADLS_ACUITY_SCORE: 48
ADLS_ACUITY_SCORE: 44
ADLS_ACUITY_SCORE: 48
ADLS_ACUITY_SCORE: 44
ADLS_ACUITY_SCORE: 48

## 2024-09-05 NOTE — PLAN OF CARE
"[id number]  VSS on RA. Forgetful, oriented x4. Denies pain, cp, n/v, sob. SBA with walker. Start first dose of Eliquis this shift, transition from Coumadin.     Plan: TCU pending, [facility] following.     Goal Outcome Evaluation:      Plan of Care Reviewed With: patient    Overall Patient Progress: no change    Outcome Evaluation: BP and BG stable      Problem: Adult Inpatient Plan of Care  Goal: Plan of Care Review  Description: The Plan of Care Review/Shift note should be completed every shift.  The Outcome Evaluation is a brief statement about your assessment that the patient is improving, declining, or no change.  This information will be displayed automatically on your shift  note.  Outcome: Progressing  Flowsheets (Taken 9/5/2024 0347)  Outcome Evaluation: BP and BG stable  Plan of Care Reviewed With: patient  Overall Patient Progress: no change  Goal: Patient-Specific Goal (Individualized)  Description: You can add care plan individualizations to a care plan. Examples of Individualization might be:  \"Parent requests to be called daily at 9am for status\", \"I have a hard time hearing out of my right ear\", or \"Do not touch me to wake me up as it startles  me\".  Outcome: Progressing  Goal: Absence of Hospital-Acquired Illness or Injury  Outcome: Progressing  Intervention: Identify and Manage Fall Risk  Recent Flowsheet Documentation  Taken 9/4/2024 1946 by Nathalia Aguilar RN  Safety Promotion/Fall Prevention:   safety round/check completed   room organization consistent   supervised activity  Intervention: Prevent Skin Injury  Recent Flowsheet Documentation  Taken 9/4/2024 1946 by Nathalia Aguilar RN  Body Position: position changed independently  Intervention: Prevent Infection  Recent Flowsheet Documentation  Taken 9/4/2024 1946 by Nathalia Aguilar RN  Infection Prevention:   single patient room provided   rest/sleep promoted   hand hygiene promoted  Goal: Optimal Comfort and Wellbeing  Outcome: " Progressing  Goal: Readiness for Transition of Care  Outcome: Progressing     Problem: Fluid Volume Deficit  Goal: Fluid Balance  Outcome: Progressing     Problem: Comorbidity Management  Goal: Maintenance of Heart Failure Symptom Control  Outcome: Progressing  Intervention: Maintain Heart Failure Management  Recent Flowsheet Documentation  Taken 9/4/2024 1946 by Nathalia Aguilar RN  Medication Review/Management: medications reviewed  Goal: Blood Glucose Levels Within Targeted Range  Outcome: Progressing  Intervention: Monitor and Manage Glycemia  Recent Flowsheet Documentation  Taken 9/4/2024 1946 by Nathalia Aguilar RN  Medication Review/Management: medications reviewed     Problem: Bleeding Risk or Actual (Thrombocytopenia)  Goal: Absence of Bleeding  Outcome: Progressing

## 2024-09-05 NOTE — PROGRESS NOTES
Care Management Follow Up    Length of Stay (days): 6    Expected Discharge Date: 09/06/2024     Concerns to be Addressed: discharge planning     Patient plan of care discussed at interdisciplinary rounds: Yes    Anticipated Discharge Disposition:  Home with resumption of home care with [facility]   Anticipated Discharge Services:  Home care  Anticipated Discharge DME:  none    Patient/family educated on Medicare website which has current facility and service quality ratings:  yes  Education Provided on the Discharge Plan:  yes  Patient/Family in Agreement with the Plan:  yes    Referrals Placed by CM/SW:  NA  Private pay costs discussed: transportation costs    Discussed  Partnership in Safe Discharge Planning  document with patient/family: No     Handoff Completed: No, handoff not indicated or clinically appropriate    Additional Information:  TCU referrals sent. Met with patient today. She reported the she only wants to go to [facility] TCU. This facility has declined patient, no beds available. Offered other facilities that could take her but she declined. She reports that she will go home with her  and home care, [facility]. She reports that she has talked with them already and her HHA will see her when she gets home.    Next Steps: Home with home care. SW will need an order for home care RN, HHA and PT.    Transportation home will be with be family per patient.    LAUREN Diaz   Inpatient Care Coordination   Supervisor  [facility]  [phone]      LAUREN Nuno

## 2024-09-05 NOTE — PROGRESS NOTES
Hematology/Oncology:      CHART CHECK:     Today's Date: 09/05/24  Date of Admission:  8/30/2024  Reason for Consult: Thrombocytopenia, presumed ITP    Baseline platelets 100 K, admitted for severe thrombocytopenia with platelets 2K now s/p Dex x 4 days and IVIG x 2 doses.  Platelets have improved and 152K today.      We have recommended starting Eliquis 5 mg BID, but patient may choose to resume her Coumadin.    She has follow-up with Dr. Pompa next week with repeat labs.  No further recommendations.  We will sign off.    Erica Contreras PA-C  Hematology/Oncology  [facility]

## 2024-09-05 NOTE — PLAN OF CARE
"  Goal Outcome Evaluation:      Plan of Care Reviewed With: patient    Overall Patient Progress: improvingOverall Patient Progress: improving     A&O, can be forgetful at times. Up with assist of one, walker and gait belt.   Denies of pain. Vitals stable.      Problem: Adult Inpatient Plan of Care  Goal: Plan of Care Review  Description: The Plan of Care Review/Shift note should be completed every shift.  The Outcome Evaluation is a brief statement about your assessment that the patient is improving, declining, or no change.  This information will be displayed automatically on your shift  note.  Outcome: Progressing  Flowsheets (Taken 9/5/2024 1214)  Plan of Care Reviewed With: patient  Overall Patient Progress: improving  Goal: Patient-Specific Goal (Individualized)  Description: You can add care plan individualizations to a care plan. Examples of Individualization might be:  \"Parent requests to be called daily at 9am for status\", \"I have a hard time hearing out of my right ear\", or \"Do not touch me to wake me up as it startles  me\".  Outcome: Progressing  Goal: Absence of Hospital-Acquired Illness or Injury  Outcome: Progressing  Intervention: Identify and Manage Fall Risk  Recent Flowsheet Documentation  Taken 9/5/2024 0820 by Fior Livingston RN  Safety Promotion/Fall Prevention:   safety round/check completed   room organization consistent   supervised activity  Intervention: Prevent Skin Injury  Recent Flowsheet Documentation  Taken 9/5/2024 0820 by Fior Livingston RN  Body Position: position changed independently  Intervention: Prevent Infection  Recent Flowsheet Documentation  Taken 9/5/2024 0820 by Fior Livingston RN  Infection Prevention: hand hygiene promoted  Goal: Optimal Comfort and Wellbeing  Outcome: Progressing  Goal: Readiness for Transition of Care  Outcome: Progressing     Problem: Fluid Volume Deficit  Goal: Fluid Balance  Outcome: Progressing     Problem: Comorbidity Management  Goal: Maintenance " of Heart Failure Symptom Control  Outcome: Progressing  Intervention: Maintain Heart Failure Management  Recent Flowsheet Documentation  Taken 9/5/2024 0820 by Fior Livingston RN  Medication Review/Management: medications reviewed  Goal: Blood Glucose Levels Within Targeted Range  Outcome: Progressing  Intervention: Monitor and Manage Glycemia  Recent Flowsheet Documentation  Taken 9/5/2024 0820 by Fior Livingston RN  Medication Review/Management: medications reviewed     Problem: Bleeding Risk or Actual (Thrombocytopenia)  Goal: Absence of Bleeding  Outcome: Progressing

## 2024-09-05 NOTE — PROGRESS NOTES
[facility]    Medicine Progress Note - Hospitalist Service    Date of Admission:  8/30/2024    Assessment & Plan   Savanna Rehman is a 83 year old female admitted on 8/30/2024. She has a past medical history significant for atrial fibrillation, cirrhosis, congestive heart failure, coronary artery disease, diabetes mellitus type 2, fibromyalgia, GERD, gout, and sleep apnea.  She began noticing very small little red spots on her arms, legs, and abdomen starting 2 days ago.  Seems to be getting more of the spots.  Presented to emergency room for further evaluation.  She has also been noticing some bright red blood from either vaginal or rectal bleeding.  She has not completely sure which area or possibly both areas this is coming from.  Was found to have severe thrombocytopenia on lab testing.    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 Baseline appears to be around 95-130k  -given 3 units of Platelets with minimal improvement  -ulikely PPI/cirrhosis/coumadin are contributing but changed to pepcid  -seen by hematology, started on IVIG and decadron  8/31-9/3 with marked improvement  -hematology recommending DOAC, started and tolerating well  -outpt follow up, ok to discharge. 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 Seems to have variable emotional states but not neurologic in origin    Klebsiella UTI  Lactic acidosis  -unclear etiology for lactic acidosis, could be related to steroids, hyperglycemia, cirrhosis but has abnormal UA. Doesn't even meet SIRS criteria so doubt this is sepsis and procal is negative but monitor closely  -urine cx showing klebseilla  -complete 3 days of IV Rocephin    Diabetes mellitus type 2, poorly controlled  -Aspart  insulin sliding scale as needed.  -resumed glipizide  -off steroids, decrease lantus 10 BID, high ISS    Cirrhosis  Volume overload  -Follows with [facility], on lasix outpt now resumed    Sleep apnea.  -Wear CPAP while sleeping.    Atrial fibrillation.  Drug-induced coagulation defect.  Coronary artery disease.  Chronic heart failure with preserved ejection fraction.  -Hold warfarin.  -some fluid overload from IVIG improved after IV lasix x1  -cont home lasix 40 oral, currently appears euvolemic    GERD/esophagitis  -had recent EGD, was on protonix but holding due to thrombocytopenia  -on pepcid now    Chronic kidney disease stage IIIa.  -Creatinine appears to be at baseline.          Diet: Moderate Consistent Carb (60 g CHO per Meal) Diet    DVT Prophylaxis: DOAC  Aguilar Catheter: Not present  Lines: None     Cardiac Monitoring: None  Code Status: No CPR- Pre-arrest intubation OK         Disposition Plan     Medically Ready for Discharge: Ready Now, await placement       Marc Abdul DO  Hospitalist Service  [facility]  Securely message with [email or web address] (more info)  Text page via [email or web address] Paging/Directory   ______________________________________________________________________    Interval History   No overnight events, awaiting placement. Started on DOAC and tolerating well    Physical Exam   Vital Signs: Temp: 97.6  F (36.4  C) Temp src: Oral BP: (!) 149/74 Pulse: 60   Resp: 18 SpO2: 96 % O2 Device: None (Room air)    Weight: 232 lbs 0 oz  Constitutional: awake, alert, and cooperative  Eyes: pupils equal, round and reactive to light and conjunctiva normal  ENT: normocephalic, without obvious abnormality, atraumatic  Respiratory: no increased work of breathing, good air exchange, and clear to auscultation  Cardiovascular: regular rate and rhythm and no murmur noted  GI: normal bowel sounds, soft, and non-distended  Skin: scattered bruising on extremities and worse on arms, no rashes  Neurologic: alert,  interactive, fluent speech with no focal deficits    45 MINUTES SPENT BY ME on the date of service doing chart review, history, exam, documentation & further activities per the note.      Data   ------------------------- PAST 24 HR DATA REVIEWED -----------------------------------------------    I have personally reviewed the following data over the past 24 hrs:    5.1  \   13.8   / 152     N/A N/A N/A /  126 (H)   N/A N/A N/A \       Imaging results reviewed over the past 24 hrs:   No results found for this or any previous visit (from the past 24 hour(s)).

## 2024-09-06 ENCOUNTER — TELEPHONE (OUTPATIENT)
Dept: INTERNAL MEDICINE | Facility: CLINIC | Age: 84
End: 2024-09-06

## 2024-09-06 ENCOUNTER — APPOINTMENT (OUTPATIENT)
Dept: PHYSICAL THERAPY | Facility: CLINIC | Age: 84
DRG: 813 | End: 2024-09-06
Payer: COMMERCIAL

## 2024-09-06 LAB
ERYTHROCYTE [DISTWIDTH] IN BLOOD BY AUTOMATED COUNT: 14 % (ref 10–15)
GLUCOSE BLDC GLUCOMTR-MCNC: 144 MG/DL (ref 70–99)
GLUCOSE BLDC GLUCOMTR-MCNC: 157 MG/DL (ref 70–99)
GLUCOSE BLDC GLUCOMTR-MCNC: 159 MG/DL (ref 70–99)
GLUCOSE BLDC GLUCOMTR-MCNC: 159 MG/DL (ref 70–99)
GLUCOSE BLDC GLUCOMTR-MCNC: 183 MG/DL (ref 70–99)
HCT VFR BLD AUTO: 40.3 % (ref 35–47)
HGB BLD-MCNC: 13.6 G/DL (ref 11.7–15.7)
MCH RBC QN AUTO: 30.8 PG (ref 26.5–33)
MCHC RBC AUTO-ENTMCNC: 33.7 G/DL (ref 31.5–36.5)
MCV RBC AUTO: 91 FL (ref 78–100)
PLATELET # BLD AUTO: 188 10E3/UL (ref 150–450)
RBC # BLD AUTO: 4.42 10E6/UL (ref 3.8–5.2)
WBC # BLD AUTO: 6.7 10E3/UL (ref 4–11)

## 2024-09-06 PROCEDURE — 250N000013 HC RX MED GY IP 250 OP 250 PS 637: Performed by: INTERNAL MEDICINE

## 2024-09-06 PROCEDURE — 36415 COLL VENOUS BLD VENIPUNCTURE: CPT | Performed by: HOSPITALIST

## 2024-09-06 PROCEDURE — 97116 GAIT TRAINING THERAPY: CPT | Mod: GP | Performed by: PHYSICAL THERAPIST

## 2024-09-06 PROCEDURE — 99232 SBSQ HOSP IP/OBS MODERATE 35: CPT | Performed by: STUDENT IN AN ORGANIZED HEALTH CARE EDUCATION/TRAINING PROGRAM

## 2024-09-06 PROCEDURE — 120N000001 HC R&B MED SURG/OB

## 2024-09-06 PROCEDURE — 97110 THERAPEUTIC EXERCISES: CPT | Mod: GP | Performed by: PHYSICAL THERAPIST

## 2024-09-06 PROCEDURE — 85027 COMPLETE CBC AUTOMATED: CPT | Performed by: HOSPITALIST

## 2024-09-06 PROCEDURE — 250N000013 HC RX MED GY IP 250 OP 250 PS 637: Performed by: HOSPITALIST

## 2024-09-06 RX ADMIN — ESCITALOPRAM OXALATE 10 MG: 10 TABLET ORAL at 21:59

## 2024-09-06 RX ADMIN — FAMOTIDINE 10 MG: 10 TABLET, FILM COATED ORAL at 21:59

## 2024-09-06 RX ADMIN — GLIPIZIDE 2.5 MG: 2.5 TABLET, FILM COATED, EXTENDED RELEASE ORAL at 17:01

## 2024-09-06 RX ADMIN — GLIPIZIDE 2.5 MG: 2.5 TABLET, FILM COATED, EXTENDED RELEASE ORAL at 07:51

## 2024-09-06 RX ADMIN — APIXABAN 5 MG: 5 TABLET, FILM COATED ORAL at 09:10

## 2024-09-06 RX ADMIN — FUROSEMIDE 40 MG: 40 TABLET ORAL at 09:10

## 2024-09-06 RX ADMIN — APIXABAN 5 MG: 5 TABLET, FILM COATED ORAL at 21:59

## 2024-09-06 RX ADMIN — Medication 1 MG: at 21:59

## 2024-09-06 RX ADMIN — FAMOTIDINE 10 MG: 10 TABLET, FILM COATED ORAL at 09:10

## 2024-09-06 ASSESSMENT — ACTIVITIES OF DAILY LIVING (ADL)
ADLS_ACUITY_SCORE: 47
ADLS_ACUITY_SCORE: 48
ADLS_ACUITY_SCORE: 47
ADLS_ACUITY_SCORE: 48
ADLS_ACUITY_SCORE: 47
ADLS_ACUITY_SCORE: 48
ADLS_ACUITY_SCORE: 47
ADLS_ACUITY_SCORE: 48
ADLS_ACUITY_SCORE: 47
ADLS_ACUITY_SCORE: 43
ADLS_ACUITY_SCORE: 47
ADLS_ACUITY_SCORE: 48
ADLS_ACUITY_SCORE: 48
ADLS_ACUITY_SCORE: 47

## 2024-09-06 NOTE — PROGRESS NOTES
Care Management Follow Up    Length of Stay (days): 7    Expected Discharge Date: 09/06/2024     Concerns to be Addressed: discharge planning     Patient plan of care discussed at interdisciplinary rounds: Yes    Anticipated Discharge Disposition:                Anticipated Discharge Services:    Anticipated Discharge DME:      Patient/family educated on Medicare website which has current facility and service quality ratings:    Education Provided on the Discharge Plan:    Patient/Family in Agreement with the Plan:      Referrals Placed by CM/SW:    Private pay costs discussed: Not applicable    Discussed  Partnership in Safe Discharge Planning  document with patient/family: No     Handoff Completed: No, handoff not indicated or clinically appropriate    Additional Information:    SW updated that pt is now agreeable to referrals to [facility], [facility], and [facility]. Referrals sent.     Next Steps: Find accepting TCU.     Addendum    Pt accepted to [facility] tomorrow 9/7 Saturday. Pt is accepting of this and request SW call her daughter. Pt daughter is agreeable and will pick pt up tomorrow at 1100. [facility] aware and agreeable. Their weekend phone and fax is (P: [phone] F: [phone]).     Plan for [facility] tomorrow Saturday 9/7 via daughter transport at 1100.     Arianne Doshi/ANA LUISA Carlson, LGSARAVANAN  Inpatient Care Coordination  Emergency Room /Arianne Whitten, SIENA

## 2024-09-06 NOTE — PLAN OF CARE
"/69 (BP Location: Left arm)   Pulse 62   Temp 97.9  F (36.6  C) (Oral)   Resp 20   Ht 1.727 m (5' 8\")   Wt 105.4 kg (232 lb 6.4 oz)   LMP  (LMP Unknown)   SpO2 93%   BMI 35.34 kg/m       A/O4, VSS on RA. No pain. BG stable, carb counting continued (see flowsheets). LES +2 edema. Voiding w/o difficulty in BR. A1 w/GB + walker.  Discharge to [facility] tomorrow (9/7), daughter providing transport at 1100. Continue POA.     Goal Outcome Evaluation:      Plan of Care Reviewed With: patient    Overall Patient Progress: no changeOverall Patient Progress: no change    Outcome Evaluation: Medically stable for discharge to TCU, referrals sent    Problem: Adult Inpatient Plan of Care  Goal: Plan of Care Review  Description: The Plan of Care Review/Shift note should be completed every shift.  The Outcome Evaluation is a brief statement about your assessment that the patient is improving, declining, or no change.  This information will be displayed automatically on your shift  note.  9/6/2024 1422 by Lilibeth Sierra RN  Outcome: Progressing  Flowsheets (Taken 9/6/2024 1422)  Outcome Evaluation: Medically stable for discharge to TCU, referrals sent  Plan of Care Reviewed With: patient  Overall Patient Progress: no change  9/6/2024 1421 by Lilibeth Sierra RN  Outcome: Progressing  Flowsheets (Taken 9/6/2024 1421)  Outcome Evaluation: Medically stable for discharge, TCU referals sent - discharge pending  Plan of Care Reviewed With: patient  Goal: Patient-Specific Goal (Individualized)  Description: You can add care plan individualizations to a care plan. Examples of Individualization might be:  \"Parent requests to be called daily at 9am for status\", \"I have a hard time hearing out of my right ear\", or \"Do not touch me to wake me up as it startles  me\".  9/6/2024 1422 by Lilibeth Sierra RN  Outcome: Progressing  9/6/2024 1421 by Lilibeth Sierra RN  Outcome: Progressing  Goal: Absence of Hospital-Acquired Illness or " Injury  9/6/2024 1422 by Lilibeth Sierra RN  Outcome: Progressing  9/6/2024 1421 by Lilibeth Sierra RN  Outcome: Progressing  Intervention: Identify and Manage Fall Risk  Recent Flowsheet Documentation  Taken 9/6/2024 0925 by Lilibeth Sierra RN  Safety Promotion/Fall Prevention: safety round/check completed  Intervention: Prevent Skin Injury  Recent Flowsheet Documentation  Taken 9/6/2024 0925 by Lilibeth Sierra RN  Body Position: position changed independently  Intervention: Prevent and Manage VTE (Venous Thromboembolism) Risk  Recent Flowsheet Documentation  Taken 9/6/2024 0925 by Lilibeth Sierra RN  VTE Prevention/Management: (Eliquis) other (see comments)  Goal: Optimal Comfort and Wellbeing  9/6/2024 1422 by Lilibeth Sierra RN  Outcome: Progressing  9/6/2024 1421 by Lilibeth Sierra RN  Outcome: Progressing  Goal: Readiness for Transition of Care  9/6/2024 1422 by Lilibeth Sierra RN  Outcome: Progressing  9/6/2024 1421 by Lilibeth Sierra RN  Outcome: Progressing     Problem: Comorbidity Management  Goal: Maintenance of Heart Failure Symptom Control  9/6/2024 1422 by Lilibeth Sierra RN  Outcome: Progressing  9/6/2024 1421 by Lilibeth Sierra RN  Outcome: Progressing  Intervention: Maintain Heart Failure Management  Recent Flowsheet Documentation  Taken 9/6/2024 0925 by Lilibeth Sierra RN  Medication Review/Management: medications reviewed  Goal: Blood Glucose Levels Within Targeted Range  9/6/2024 1422 by Lilibeth Sierra RN  Outcome: Progressing  9/6/2024 1421 by Lilibeth Sierra RN  Outcome: Progressing  Intervention: Monitor and Manage Glycemia  Recent Flowsheet Documentation  Taken 9/6/2024 0925 by Lilibeth Sierra RN  Medication Review/Management: medications reviewed     Problem: Bleeding Risk or Actual (Thrombocytopenia)  Goal: Absence of Bleeding  9/6/2024 1422 by Lilibeth Sierra RN  Outcome: Progressing  9/6/2024 1421 by Lilibeth Sierra RN  Outcome: Progressing

## 2024-09-06 NOTE — PROGRESS NOTES
"BRIEF NUTRITION ASSESSMENT    REASON FOR ASSESSMENT:  Savanna Rehman is a 83 year old female seen by Registered Dietitian for LOS.     NUTRITION HISTORY:  Savanna states that she likes to cook and order her groceries on [employer]. She notes she has been trying to lose weight but this is not consistent with her weight records. She does not use oral nutrition supplements at home.     CURRENT DIET AND INTAKE:  Diet:  Moderate Consistent Carb Diet              Pt is documented to be eating mostly % intakes and consistently orders 3 meals/day.   Met with pt at bedside today. She is tired of the hospital food. We walked through the menu and talked about other menu options to try. RD offered oral nutrition supplements which patient politely declined at this time. RD encouraged adequate intake and protein, as well as to reach out with further questions.     ANTHROPOMETRICS:  Height: 5' 8\"  Weight:  232 lbs 6.4 oz  Body mass index is 35.34 kg/m .   Weight Status: Obesity Grade II BMI 35-39.9  IBW:  140 lbs  %IBW: 165%  Weight History: Fluctuating wts d/t fluid. Wts fluctuate between 230 up to 245-250 lbs.   Wt Readings from Last 20 Encounters:   09/06/24 105.4 kg (232 lb 6.4 oz)   08/12/24 111.5 kg (245 lb 14.4 oz)   08/02/24 109.2 kg (240 lb 12.8 oz)   07/26/24 110.7 kg (244 lb)   07/05/24 108 kg (238 lb)   06/28/24 113.8 kg (250 lb 14.1 oz)   06/27/24 114 kg (251 lb 5.2 oz)   06/07/24 109.6 kg (241 lb 11.2 oz)   05/01/24 124.7 kg (275 lb)   04/24/24 102.2 kg (225 lb 3.2 oz)   04/19/24 105 kg (231 lb 6.4 oz)   04/16/24 107 kg (236 lb)   04/15/24 108 kg (238 lb)   04/12/24 108.4 kg (239 lb)   04/10/24 110.7 kg (244 lb)   04/08/24 111.4 kg (245 lb 8 oz)   04/04/24 111.6 kg (246 lb 0.5 oz)   02/24/24 108 kg (238 lb)   02/08/24 107.5 kg (237 lb)   02/01/24 110.5 kg (243 lb 8 oz)     LABS:  Labs noted    MALNUTRITION:  Patient does not meet two of the following criteria necessary for diagnosing malnutrition.     % Weight " Loss:  unable to assess d/t fluid losses, pt states she was trying to lose weight and lost 60 lbs, but that is not reflected in her weight history   % Intake:  No decreased intake noted  Subcutaneous Fat Loss:  None observed  Muscle Loss:  None observed  Fluid Retention:  2+ mild in BLE    NUTRITION INTERVENTION:  Nutrition Diagnosis:  No nutrition diagnosis at this time.    Implementation:  Nutrition Education: Discussed consuming adequate amounts of protein and reviewed protein sources.     FOLLOW UP/MONITORING:   Will re-evaluate in 7 - 10 days, or sooner, if re-consulted.      Anna Darby MS, RD, LD  Clinical Dietitian  3rd floor/ICU: [phone]  All other floors: [phone]  Weekend/holiday: [phone]  Office: [phone]

## 2024-09-06 NOTE — PROGRESS NOTES
SPIRITUAL HEALTH SERVICES Progress Note  Critical access hospital [facility]    [facility] visit per consult order - request for communion. Pt is [employer].  Pt anticipates discharge today. Was hoping for Eucharist today though ministers are not scheduled.  left message for Fr. Lowe at [facility] [employer] in [place] requesting follow up with pt.  Savanna could not recall having spoken with anyone at the [facility].    Savanna welcomed a time of thanksgiving prayer as she prepares to return home. She is in good spirits and looks forward to seeing family and friends.        Jarocho Owens MA  Staff   *[phone]     [facility] remains available 24/7 for emergent requests/referrals, either by having the on-call  paged or by entering an ASAP/STAT consult in Epic (this will also page the on-call ). Routine Epic consults receive an initial response within 24 hours.

## 2024-09-06 NOTE — PLAN OF CARE
"A&O x4, can be forgetful at times. Assist of 1, Gb, walker. B, 157. Denies pain. On lantus, novolog, carb coverage, and glipizide. On PO lasix. On pepcid. Platelets improving, see results for details. R. PIV SL. Plan to discharge home with home care once able.     Goal Outcome Evaluation:      Plan of Care Reviewed With: patient    Overall Patient Progress: improvingOverall Patient Progress: improving    Outcome Evaluation: B, 157. On lantus, novolog, glipizide, and carb count. No evidence of active bleeding. Scattered bruising on arms. Has some ARDON, but pt states that is improving.      Problem: Adult Inpatient Plan of Care  Goal: Plan of Care Review  Description: The Plan of Care Review/Shift note should be completed every shift.  The Outcome Evaluation is a brief statement about your assessment that the patient is improving, declining, or no change.  This information will be displayed automatically on your shift  note.  Outcome: Progressing  Flowsheets (Taken 2024)  Outcome Evaluation: B, 157. On lantus, novolog, glipizide, and carb count. No evidence of active bleeding. Scattered bruising on arms. Has some ARDON, but pt states that is improving.  Plan of Care Reviewed With: patient  Overall Patient Progress: improving  Goal: Patient-Specific Goal (Individualized)  Description: You can add care plan individualizations to a care plan. Examples of Individualization might be:  \"Parent requests to be called daily at 9am for status\", \"I have a hard time hearing out of my right ear\", or \"Do not touch me to wake me up as it startles  me\".  Outcome: Progressing  Goal: Absence of Hospital-Acquired Illness or Injury  Outcome: Progressing  Intervention: Identify and Manage Fall Risk  Recent Flowsheet Documentation  Taken 2024 by Crystal Kaiser, RN  Safety Promotion/Fall Prevention: safety round/check completed  Taken 2024 by Crystal Kaiser, RN  Safety Promotion/Fall Prevention:   " safety round/check completed   activity supervised  Taken 9/5/2024 1929 by Crystal Kaiser, RN  Safety Promotion/Fall Prevention: safety round/check completed  Goal: Optimal Comfort and Wellbeing  Outcome: Progressing  Intervention: Monitor Pain and Promote Comfort  Recent Flowsheet Documentation  Taken 9/5/2024 2014 by Crystal Kaiser RN  Pain Management Interventions: care clustered  Goal: Readiness for Transition of Care  Outcome: Progressing     Problem: Fluid Volume Deficit  Goal: Fluid Balance  Outcome: Progressing     Problem: Comorbidity Management  Goal: Maintenance of Heart Failure Symptom Control  Outcome: Progressing  Intervention: Maintain Heart Failure Management  Recent Flowsheet Documentation  Taken 9/6/2024 0226 by Crystal Kaiser RN  Medication Review/Management: medications reviewed  Taken 9/5/2024 2015 by Crystal Kaiser RN  Medication Review/Management: medications reviewed  Goal: Blood Glucose Levels Within Targeted Range  Outcome: Progressing  Intervention: Monitor and Manage Glycemia  Recent Flowsheet Documentation  Taken 9/6/2024 0226 by Crystal Kaiser RN  Medication Review/Management: medications reviewed  Taken 9/5/2024 2015 by Crystal Kaiser, RN  Medication Review/Management: medications reviewed     Problem: Bleeding Risk or Actual (Thrombocytopenia)  Goal: Absence of Bleeding  Outcome: Progressing

## 2024-09-06 NOTE — PROGRESS NOTES
[facility]    Medicine Progress Note - Hospitalist Service    Date of Admission:  8/30/2024    Assessment & Plan   Savanna Rehman is a 83 year old female admitted on 8/30/2024. She has a past medical history significant for atrial fibrillation, cirrhosis, congestive heart failure, coronary artery disease, diabetes mellitus type 2, fibromyalgia, GERD, gout, and sleep apnea.  She began noticing very small little red spots on her arms, legs, and abdomen starting 2 days ago.  Seems to be getting more of the spots.  Presented to emergency room for further evaluation.  She has also been noticing some bright red blood from either vaginal or rectal bleeding.  She has not completely sure which area or possibly both areas this is coming from.  Was found to have severe thrombocytopenia on lab testing.  Labs now improved, PT recommending TCU, patient agreeable, awaiting placement.    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 Seems to have variable emotional states but not neurologic in origin    Klebsiella UTI  Lactic acidosis  -unclear etiology for lactic acidosis, could be related to steroids, hyperglycemia, cirrhosis but has abnormal UA. Doesn't even meet SIRS criteria so doubt this is sepsis and procal is negative but monitor closely  -urine cx showing klebseilla  -completed 3 days of IV Rocephin    Diabetes  mellitus type 2, poorly controlled  - Aspart insulin sliding scale as needed.  - Resumed glipizide  - Lantus 10 at bedtime, high ISS    Cirrhosis  Volume overload  - Follows with [facility], on lasix outpt now resumed    Sleep apnea.  - Wear CPAP while sleeping.    Atrial fibrillation.  Drug-induced coagulation defect.  Coronary artery disease.  Chronic heart failure with preserved ejection fraction.  - Hold warfarin.  - some fluid overload from IVIG improved after IV lasix x1  - cont home lasix 40 oral, currently appears euvolemic    GERD/esophagitis  - Had recent EGD, was on protonix but holding due to thrombocytopenia  - On pepcid now    Chronic kidney disease stage IIIa.  - Creatinine appears to be at baseline.          Diet: Moderate Consistent Carb (60 g CHO per Meal) Diet    DVT Prophylaxis: DOAC  Aguilar Catheter: Not present  Lines: None     Cardiac Monitoring: None  Code Status: No CPR- Pre-arrest intubation OK         Disposition Plan     Medically Ready for Discharge: Ready Now, await placement at TCU       Louis Norht MD  Hospitalist Service  [facility]  Securely message with [employer] (more info)  Text page via [facility] Paging/Directory   ______________________________________________________________________    Interval History   No acute events overnight.  Was hoping to go home with home health, but after working with PT and talking to social work, patient is agreeable to placement at [facility].  No other concerns or complaints at this time.    Physical Exam   Vital Signs: Temp: 97.4  F (36.3  C) Temp src: Oral BP: 114/54 Pulse: 66   Resp: 20 SpO2: 93 % O2 Device: None (Room air)    Weight: 232 lbs 6.4 oz  Constitutional: awake, alert, and cooperative  Eyes: pupils equal, round and reactive to light and conjunctiva normal  ENT: normocephalic, without obvious abnormality, atraumatic  Respiratory: no increased work of breathing, good air exchange, and clear to auscultation  Cardiovascular: regular  rate and rhythm and no murmur noted  GI: normal bowel sounds, soft, and non-distended  Skin: scattered bruising on extremities and worse on arms, no rashes  Neurologic: alert, interactive, fluent speech with no focal deficits    45 MINUTES SPENT BY ME on the date of service doing chart review, history, exam, documentation & further activities per the note.      Data   ------------------------- PAST 24 HR DATA REVIEWED -----------------------------------------------    I have personally reviewed the following data over the past 24 hrs:    6.7  \   13.6   / 188     N/A N/A N/A /  159 (H)   N/A N/A N/A \       Imaging results reviewed over the past 24 hrs:   No results found for this or any previous visit (from the past 24 hour(s)).

## 2024-09-07 ENCOUNTER — LAB REQUISITION (OUTPATIENT)
Dept: LAB | Facility: CLINIC | Age: 84
End: 2024-09-07
Payer: COMMERCIAL

## 2024-09-07 ENCOUNTER — TRANSFERRED RECORDS (OUTPATIENT)
Dept: HEALTH INFORMATION MANAGEMENT | Facility: CLINIC | Age: 84
End: 2024-09-07

## 2024-09-07 VITALS
HEIGHT: 68 IN | TEMPERATURE: 98 F | RESPIRATION RATE: 18 BRPM | HEART RATE: 63 BPM | SYSTOLIC BLOOD PRESSURE: 143 MMHG | BODY MASS INDEX: 35.04 KG/M2 | OXYGEN SATURATION: 98 % | DIASTOLIC BLOOD PRESSURE: 73 MMHG | WEIGHT: 231.2 LBS

## 2024-09-07 DIAGNOSIS — Z11.1 ENCOUNTER FOR SCREENING FOR RESPIRATORY TUBERCULOSIS: ICD-10-CM

## 2024-09-07 LAB
ANION GAP SERPL CALCULATED.3IONS-SCNC: 11 MMOL/L (ref 7–15)
BUN SERPL-MCNC: 44.9 MG/DL (ref 8–23)
CALCIUM SERPL-MCNC: 9.1 MG/DL (ref 8.8–10.4)
CHLORIDE SERPL-SCNC: 96 MMOL/L (ref 98–107)
CREAT SERPL-MCNC: 1.17 MG/DL (ref 0.51–0.95)
EGFRCR SERPLBLD CKD-EPI 2021: 47 ML/MIN/1.73M2
ERYTHROCYTE [DISTWIDTH] IN BLOOD BY AUTOMATED COUNT: 13.7 % (ref 10–15)
GLUCOSE BLDC GLUCOMTR-MCNC: 126 MG/DL (ref 70–99)
GLUCOSE BLDC GLUCOMTR-MCNC: 147 MG/DL (ref 70–99)
GLUCOSE SERPL-MCNC: 149 MG/DL (ref 70–99)
HCO3 SERPL-SCNC: 29 MMOL/L (ref 22–29)
HCT VFR BLD AUTO: 43.5 % (ref 35–47)
HGB BLD-MCNC: 14.6 G/DL (ref 11.7–15.7)
MCH RBC QN AUTO: 30.5 PG (ref 26.5–33)
MCHC RBC AUTO-ENTMCNC: 33.6 G/DL (ref 31.5–36.5)
MCV RBC AUTO: 91 FL (ref 78–100)
PLATELET # BLD AUTO: 165 10E3/UL (ref 150–450)
POTASSIUM SERPL-SCNC: 3.9 MMOL/L (ref 3.4–5.3)
RBC # BLD AUTO: 4.79 10E6/UL (ref 3.8–5.2)
SODIUM SERPL-SCNC: 136 MMOL/L (ref 135–145)
WBC # BLD AUTO: 8.1 10E3/UL (ref 4–11)

## 2024-09-07 PROCEDURE — 250N000013 HC RX MED GY IP 250 OP 250 PS 637: Performed by: INTERNAL MEDICINE

## 2024-09-07 PROCEDURE — 36415 COLL VENOUS BLD VENIPUNCTURE: CPT | Performed by: HOSPITALIST

## 2024-09-07 PROCEDURE — 250N000013 HC RX MED GY IP 250 OP 250 PS 637: Performed by: HOSPITALIST

## 2024-09-07 PROCEDURE — 80048 BASIC METABOLIC PNL TOTAL CA: CPT | Performed by: STUDENT IN AN ORGANIZED HEALTH CARE EDUCATION/TRAINING PROGRAM

## 2024-09-07 PROCEDURE — 99239 HOSP IP/OBS DSCHRG MGMT >30: CPT | Performed by: STUDENT IN AN ORGANIZED HEALTH CARE EDUCATION/TRAINING PROGRAM

## 2024-09-07 PROCEDURE — 85027 COMPLETE CBC AUTOMATED: CPT | Performed by: HOSPITALIST

## 2024-09-07 RX ORDER — FAMOTIDINE 10 MG
10 TABLET ORAL 2 TIMES DAILY PRN
DISCHARGE
Start: 2024-09-07 | End: 2024-09-19

## 2024-09-07 RX ADMIN — FAMOTIDINE 10 MG: 10 TABLET, FILM COATED ORAL at 08:31

## 2024-09-07 RX ADMIN — APIXABAN 5 MG: 5 TABLET, FILM COATED ORAL at 08:31

## 2024-09-07 RX ADMIN — FUROSEMIDE 40 MG: 40 TABLET ORAL at 08:31

## 2024-09-07 RX ADMIN — GLIPIZIDE 2.5 MG: 2.5 TABLET, FILM COATED, EXTENDED RELEASE ORAL at 08:31

## 2024-09-07 ASSESSMENT — ACTIVITIES OF DAILY LIVING (ADL)
ADLS_ACUITY_SCORE: 43

## 2024-09-07 NOTE — DISCHARGE SUMMARY
"[facility]  Hospitalist Discharge Summary      Date of Admission:  8/30/2024  Date of Discharge:  9/7/2024  Discharging Provider: Louis North MD  Discharge Service: Hospitalist Service    Discharge Diagnoses   Immune mediated thrombocytopenia  Chronic thrombocytopenia  Klebsiella UTI  Type 2 diabetes mellitus  Cirrhosis of the liver  CRISTIANA  Atrial fibrillation on anticoagulation  Coronary artery disease  HFpEF  CKD stage IIIa    Clinically Significant Risk Factors     # Obesity: Estimated body mass index is 35.15 kg/m  as calculated from the following:    Height as of this encounter: 1.727 m (5' 8\").    Weight as of this encounter: 104.9 kg (231 lb 3.2 oz).       Follow-ups Needed After Discharge   Follow-up Appointments     Follow Up and recommended labs and tests      Follow-up appointment with the Oncology and Hematology clinic is already   scheduled for next week, repeat labs will be checked at this time to check   your blood counts.     Please also follow-up with your primary care doctor within the next few   weeks for routine hospital follow-up.            Unresulted Labs Ordered in the Past 30 Days of this Admission       No orders found from 7/31/2024 to 8/31/2024.          Discharge Disposition   Discharged to rehabilitation facility  Condition at discharge: Stable    Hospital Course   Savanna Rehman is a 83 year old female admitted on 8/30/2024. She has a past medical history significant for atrial fibrillation, cirrhosis, congestive heart failure, coronary artery disease, diabetes mellitus type 2, fibromyalgia, GERD, gout, and sleep apnea.  She began noticing very small little red spots on her arms, legs, and abdomen starting 2 days ago.  Seems to be getting more of the spots.  Presented to emergency room for further evaluation.  She had also been noticing some bright red blood from either vaginal or rectal bleeding.  Was found to have severe thrombocytopenia on labs.  Hematology " consulted, ultimately believed to be ITP.  Received IVIG and dexamethasone with marked improvement of platelets, returning to WNL prior to discharge.  Will have follow-up with hematology next week after discharge.  PT evaluated patient during admission, recommended TCU for ongoing rehabilitation.    Severe thrombocytopenia w/suspected ITP  Chronic thrombocytopenia  -Platelets found to be 2 on admission labs. Baseline appears to be around 95-130k  -given 3 units of Platelets with minimal improvement  -unlikely PPI/cirrhosis/coumadin are contributing but changed to pepcid, DOAC  -seen by hematology, started on IVIG and decadron 8/31-9/3 for likely ITP with marked improvement  -hematology recommending DOAC instead of warfarin, started and tolerating well  -Hematology follow-up with repeat labs scheduled for next week    Encephalopathy-improved  -some confusion and questionable speech difficulty 9/2 morning but no focal deficits  -CT head reassuring, does show old CVA's  -Resolved quickly, appeared to be related to the decadron and improved once this was completed    Klebsiella UTI  Lactic acidosis  -Had lactic acidosis of unclear etiology, likely related to steroid administration  -urine cx showing klebseilla, but no SIRS criteria met  -completed 3 days of IV Rocephin    Diabetes mellitus type 2, poorly controlled  Started Lantus during admission as blood sugars were high, although this was in the setting of dexamethasone administration.  Did not discharge on insulin, restarted PTA oral diabetes medications, PCP follow-up to recheck sugar/A1c and determine need for additional diabetes management.  - Follow-up with PCP  - Steroid-induced hyperglycemia during admission    Cirrhosis  Volume overload  - Follows with [facility], on lasix outpt now resumed    Sleep apnea.  - Wear CPAP while sleeping.    Atrial fibrillation.  Drug-induced coagulation defect.  Coronary artery disease.  Chronic heart failure with preserved ejection  fraction.  - Hold warfarin.  - some fluid overload from IVIG improved after IV lasix x1  - cont home lasix 40 oral, currently appears euvolemic    GERD/esophagitis  - Had recent EGD, was on protonix but holding due to thrombocytopenia  - On pepcid now    Chronic kidney disease stage IIIa.  - Creatinine appears to be at baseline.    Consultations This Hospital Stay   HEMATOLOGY & ONCOLOGY IP CONSULT  ADVANCE DIRECTIVE IP CONSULT  CARE MANAGEMENT / SOCIAL WORK IP CONSULT  PHYSICAL THERAPY ADULT IP CONSULT  SPIRITUAL HEALTH SERVICES IP CONSULT  PHARMACY LIAISON FOR MEDICATION COVERAGE CONSULT  SPIRITUAL HEALTH SERVICES IP CONSULT  PHYSICAL THERAPY ADULT IP CONSULT  OCCUPATIONAL THERAPY ADULT IP CONSULT    Code Status   No CPR- Pre-arrest intubation OK    Time Spent on this Encounter   I, Louis North MD, personally saw the patient today and spent greater than 30 minutes discharging this patient.       Loius North MD  [place] [id number] [facility]  [address] [place] [address]  Phone: [phone]  Fax: [phone]  ______________________________________________________________________    Physical Exam   Vital Signs: Temp: 98  F (36.7  C) Temp src: Oral BP: (!) 143/73 Pulse: 63   Resp: 18 SpO2: 98 % O2 Device: None (Room air)    Weight: 231 lbs 3.2 oz  Constitutional: awake, alert, and cooperative  Eyes: pupils equal, round and reactive to light and conjunctiva normal  ENT: normocephalic, without obvious abnormality, atraumatic  Respiratory: no increased work of breathing, good air exchange, and clear to auscultation  Cardiovascular: regular rate and rhythm and no murmur noted  GI: normal bowel sounds, soft, and non-distended  Skin: scattered bruising on extremities and worse on arms, no rashes  Neurologic: alert, interactive, fluent speech with no focal deficits       Primary Care Physician   Taylor Payan    Discharge Orders      Home Care Referral      General info for SNF    Length  of Stay Estimate: Short Term Care: Estimated # of Days <30  Condition at Discharge: Improving  Level of care:skilled   Rehabilitation Potential: Excellent  Admission H&P remains valid and up-to-date: Yes  Recent Chemotherapy: N/A  Use Nursing Home Standing Orders: Yes     Mantoux instructions    Give two-step Mantoux (PPD) Per Facility Policy Yes     Follow Up and recommended labs and tests    Follow-up appointment with the Oncology and Hematology clinic is already scheduled for next week, repeat labs will be checked at this time to check your blood counts.     Please also follow-up with your primary care doctor within the next few weeks for routine hospital follow-up.     Reason for your hospital stay    Low platelet count due to a condition called ITP.     Activity - Up ad jamil     Physical Therapy Adult Consult    Evaluate and treat as clinically indicated.    Reason:  Weakness, deconditioning     Occupational Therapy Adult Consult    Evaluate and treat as clinically indicated.    Reason:  Weakness, deconditioning     Diet    Follow this diet upon discharge: Current Diet:Orders Placed This Encounter      Moderate Consistent Carb (60 g CHO per Meal) Diet       Significant Results and Procedures   Most Recent 3 CBC's:  Recent Labs   Lab Test 09/07/24  0646 09/06/24  0805 09/05/24  0659   WBC 8.1 6.7 5.1   HGB 14.6 13.6 13.8   MCV 91 91 89    188 152     Most Recent 3 BMP's:  Recent Labs   Lab Test 09/07/24  0652 09/07/24  0646 09/07/24  0203 09/03/24  0747 09/03/24  0652 09/01/24  0707 09/01/24  0615   NA  --  136  --   --  135  --  134*   POTASSIUM  --  3.9  --   --  3.7  --  4.0   CHLORIDE  --  96*  --   --  99  --  93*   CO2  --  29  --   --  25  --  27   BUN  --  44.9*  --   --  38.6*  --  23.4*   CR  --  1.17*  --   --  0.95  --  1.03*   ANIONGAP  --  11  --   --  11  --  14   SAUL  --  9.1  --   --  8.9  --  9.4   * 149* 126*   < > 230*   < > 245*    < > = values in this interval not displayed.      Most Recent 2 LFT's:  Recent Labs   Lab Test 08/31/24  0714 08/30/24  1718   AST 46* 40   ALT 30 34   ALKPHOS 113 135   BILITOTAL 1.9* 1.2     Most Recent 3 INR's:  Recent Labs   Lab Test 09/01/24  0615 08/30/24  1718 08/27/24  1235   INR 1.41* 2.04* 3.6   ,   Results for orders placed or performed during the hospital encounter of 08/30/24   CT Abdomen Pelvis w Contrast    Narrative    EXAM: CT ABDOMEN PELVIS W CONTRAST  LOCATION: [facility]  DATE: 8/30/2024    INDICATION: Cirrhosis, rectal bleeding, thrombocytopenia; eval mass.  COMPARISON: 4/1/2024.  TECHNIQUE: CT scan of the abdomen and pelvis was performed following injection of IV contrast. Multiplanar reformats were obtained. Dose reduction techniques were used.  CONTRAST: 100 mL Isovue-370.    FINDINGS:   LOWER CHEST: Pacemaker. Mild interstitial infiltrate within the right lower lobe with a few regions of groundglass nodularity.    HEPATOBILIARY: Cirrhosis. Cholecystectomy.    PANCREAS: Partial fatty replacement.    SPLEEN: Splenomegaly.    ADRENAL GLANDS: Normal.    KIDNEYS/BLADDER: Normal.    BOWEL: Multiple sigmoid diverticula are present. Again seen is the lobulated cystic region adjacent to the lateral wall of the mid sigmoid unchanged. This could be a small contained abscess although there is no additional evidence for acute   diverticulitis on today's study. There is a small amount of stranding adjacent to the proximal ascending colon. Postsurgical change at the GE junction.    LYMPH NODES: Normal.    VASCULATURE: Normal.    PELVIC ORGANS: Hysterectomy.    MUSCULOSKELETAL: Multilevel lumbar degenerative change.      Impression    IMPRESSION:   1.  Cirrhosis with splenomegaly. No ascites.    2.  Multiple sigmoid diverticula without acute diverticulitis. Again seen is a small lobulated cystic mass along the left lateral aspect of the sigmoid. This could be contained abscess but is unchanged measuring approximately 2.5 x 2.7  cm.    3.  New small amount of stranding is seen along the lateral aspect of the proximal ascending colon possibly minimal diverticulitis.    4.  Cholecystectomy and hysterectomy with postsurgical change at the GE junction.   CT Head w/o Contrast    Narrative    EXAM: CT HEAD W/O CONTRAST  LOCATION: [facility]  DATE: 9/2/2024    INDICATION: Confusion, rule out CVA.  COMPARISON: Brain MRI 9/21/2023, head CT 9/21/2023.  TECHNIQUE: Routine CT Head without IV contrast. Multiplanar reformats. Dose reduction techniques were used.    FINDINGS:  Several images are degraded by patient motion artifact, which moderately limits evaluation.    INTRACRANIAL CONTENTS: No intracranial hemorrhage, extraaxial collection, or mass effect.  No CT evidence of acute infarct. Moderate to severe presumed chronic small vessel ischemic changes throughout the cerebral hemispheric white matter bilaterally.   Small region of chronic cortical infarction in the right occipital lobe, within the right posterior cerebral artery territory. Multiple chronic bilateral basal ganglia and thalamic lacunar infarcts. Moderate generalized volume loss. No hydrocephalus.     VISUALIZED ORBITS/SINUSES/MASTOIDS: Prior bilateral cataract surgery. Visualized portions of the orbits are otherwise unremarkable. No paranasal sinus mucosal disease. No middle ear or mastoid effusion.    BONES/SOFT TISSUES: No acute abnormality.      Impression    IMPRESSION:  1.  Motion degraded examination demonstrates no definite acute intracranial process.  2.  Stable moderate to severe chronic small vessel ischemic disease and moderate generalized brain parenchymal volume loss since 9/21/2023.  3.  Stable small region of chronic infarction within the right posterior cerebral artery territory.     *Note: Due to a large number of results and/or encounters for the requested time period, some results have not been displayed. A complete set of results can be found  in Results Review.       Discharge Medications   Current Discharge Medication List        START taking these medications    Details   apixaban ANTICOAGULANT (ELIQUIS) 5 MG tablet Take 1 tablet (5 mg) by mouth 2 times daily.    Associated Diagnoses: Chronic atrial fibrillation (H); CRISTIANA (obstructive sleep apnea); Permanent atrial fibrillation (H); Long term current use of anticoagulant therapy; Atrial fibrillation, unspecified type (H); Atrial fibrillation with slow ventricular response (H)           CONTINUE these medications which have NOT CHANGED    Details   acetaminophen (TYLENOL) 500 MG tablet Take 1,000 mg by mouth every 6 hours as needed for mild pain      butalbital-aspirin-caffeine (FIORINAL) -40 MG capsule TAKE 1 CAPSULE BY MOUTH EVERY 4 HOURS AS NEEDED FOR HEADACHES OR  MIGRAINE  Qty: 10 capsule, Refills: 0    Associated Diagnoses: Migraine without aura and without status migrainosus, not intractable      cholecalciferol (VITAMIN D3) 10 mcg (400 units) TABS tablet Take 10 mcg by mouth daily.      EPINEPHrine (ANY BX GENERIC EQUIV) 0.3 MG/0.3ML injection 2-pack Inject 0.3 mLs (0.3 mg) into the muscle as needed for anaphylaxis May repeat one time in 5-15 minutes if response to initial dose is inadequate.  Qty: 2 each, Refills: 3    Associated Diagnoses: Angioedema, initial encounter      escitalopram (LEXAPRO) 10 MG tablet Take 1 tablet (10 mg) by mouth at bedtime  Qty: 90 tablet, Refills: 1    Associated Diagnoses: Moderate episode of recurrent major depressive disorder (H)      furosemide (LASIX) 40 MG tablet Take 1 tablet (40 mg) by mouth daily for 90 days  Qty: 90 tablet, Refills: 0    Associated Diagnoses: Congestive heart failure, unspecified HF chronicity, unspecified heart failure type (H); Swelling of lower leg      glipiZIDE (GLUCOTROL XL) 2.5 MG 24 hr tablet Take 1 tablet by mouth twice daily  Qty: 180 tablet, Refills: 3    Associated Diagnoses: Diabetic polyneuropathy associated with type 2  diabetes mellitus (H)      loperamide (IMODIUM) 2 MG capsule TAKE 1 CAPSULE BY MOUTH 4 TIMES DAILY AS NEEDED FOR DIARRHEA  Qty: 60 capsule, Refills: 0    Associated Diagnoses: Diarrhea, unspecified type      melatonin 3 MG tablet Take 3 mg by mouth nightly as needed for sleep.      omeprazole (PRILOSEC) 20 MG DR capsule Take 1 capsule (20 mg) by mouth daily  Qty: 30 capsule, Refills: 11    Associated Diagnoses: Gastro-esophageal reflux disease without esophagitis           STOP taking these medications       warfarin ANTICOAGULANT (COUMADIN) 2.5 MG tablet Comments:   Reason for Stopping:             Allergies   Allergies   Allergen Reactions    Augmented Betamethasone Diprop [Betamethasone] Other (See Comments)     Severe yeast infection    Petrolatum Anaphylaxis and Swelling     Rash and swelling    Shellfish-Derived Products Anaphylaxis     Tongue swelling    Aspirin Swelling     tongue swelling    Bacitracin      Rash swelling    Bactrim [Sulfamethoxazole-Trimethoprim] Dizziness    Darvon [Propoxyphene] Swelling     Throat closes    Dilaudid [Hydromorphone]      No side effects from fentanyl June 2023    Levaquin [Levofloxacin] Swelling     Tongue swelling    Lidocaine      Facial swelling     Morphine Unknown     [facility]    Neomycin Swelling     rash    Neosporin [Neomycin-Polymyxin-Gramicidin] Swelling     rash    Nitrofurantoin      SOB, GI upset,    Oxycodone      Severe itching    Percocet [Oxycodone-Acetaminophen] Unknown    Percodan [Oxycodone-Aspirin]      Severe itching    Polymyxin B     Pramoxine     Tramadol     Vicodin [Hydrocodone-Acetaminophen]      Severe itching      Xarelto [Rivaroxaban]     Adhesive Tape Rash     Band aids     Codeine Rash    Hydrocortisone Rash and Swelling    Other Environmental Allergy Rash     Adhesive tape   Band aids

## 2024-09-07 NOTE — PLAN OF CARE
"  Goal Outcome Evaluation:      Plan of Care Reviewed With: patient    Overall Patient Progress: improving    Outcome Evaluation:     Alert and oriented x4 , VSS on RA.Denied pain or discomfort at any site  . Afebrile , No active bleeding from any site . Incontinent to urine , use walker and scooter for mobility .       Problem: Adult Inpatient Plan of Care  Goal: Plan of Care Review  Description: The Plan of Care Review/Shift note should be completed every shift.  The Outcome Evaluation is a brief statement about your assessment that the patient is improving, declining, or no change.  This information will be displayed automatically on your shift  note.  Outcome: Progressing  Flowsheets (Taken 9/7/2024 0707)  Outcome Evaluation: v ss  Plan of Care Reviewed With: patient  Overall Patient Progress: improving  Goal: Patient-Specific Goal (Individualized)  Description: You can add care plan individualizations to a care plan. Examples of Individualization might be:  \"Parent requests to be called daily at 9am for status\", \"I have a hard time hearing out of my right ear\", or \"Do not touch me to wake me up as it startles  me\".  Outcome: Progressing  Goal: Absence of Hospital-Acquired Illness or Injury  Outcome: Progressing  Intervention: Identify and Manage Fall Risk  Recent Flowsheet Documentation  Taken 9/6/2024 2320 by Parvez Avila, RN  Safety Promotion/Fall Prevention:   activity supervised   assistive device/personal items within reach   lighting adjusted   nonskid shoes/slippers when out of bed   patient and family education   room organization consistent   safety round/check completed   mobility aid in reach  Intervention: Prevent Skin Injury  Recent Flowsheet Documentation  Taken 9/6/2024 2320 by Parvez Avila, RN  Body Position: position changed independently  Skin Protection:   adhesive use limited   transparent dressing maintained  Device Skin Pressure Protection:   absorbent pad utilized/changed   adhesive " use limited   tubing/devices free from skin contact  Intervention: Prevent Infection  Recent Flowsheet Documentation  Taken 9/6/2024 2320 by Parvez Avila RN  Infection Prevention:   rest/sleep promoted   cohorting utilized  Goal: Optimal Comfort and Wellbeing  Outcome: Progressing  Intervention: Provide Person-Centered Care  Recent Flowsheet Documentation  Taken 9/6/2024 2320 by Parvez Avila RN  Trust Relationship/Rapport:   care explained   choices provided   emotional support provided   empathic listening provided   questions answered   questions encouraged  Goal: Readiness for Transition of Care  Outcome: Progressing     Problem: Comorbidity Management  Goal: Maintenance of Heart Failure Symptom Control  Outcome: Progressing  Intervention: Maintain Heart Failure Management  Recent Flowsheet Documentation  Taken 9/6/2024 2320 by Parvez Avila RN  Medication Review/Management: medications reviewed  Goal: Blood Glucose Levels Within Targeted Range  Outcome: Progressing  Intervention: Monitor and Manage Glycemia  Recent Flowsheet Documentation  Taken 9/6/2024 2320 by Parvez Avila RN  Glycemic Management: blood glucose monitored  Medication Review/Management: medications reviewed     Problem: Bleeding Risk or Actual (Thrombocytopenia)  Goal: Absence of Bleeding  Outcome: Progressing  Intervention: Minimize Bleeding Risk  Recent Flowsheet Documentation  Taken 9/6/2024 2320 by Parvez Avila RN  Bleeding Precautions: monitored for signs of bleeding

## 2024-09-07 NOTE — PLAN OF CARE
Physical Therapy Discharge Summary    Reason for therapy discharge:    Discharged to transitional care facility.    Progress towards therapy goal(s). See goals on Care Plan in [facility] electronic health record for goal details.  Goals partially met.  Barriers to achieving goals:   discharge from facility.    Therapy recommendation(s):    Continued therapy is recommended.  Rationale/Recommendations:  to maximize return to safe functional independence.

## 2024-09-07 NOTE — PLAN OF CARE
"Discharge instructions for Thrombocytopnia was provided to patient and reviewed at the bedside.  Medication list reviewed and given to patient as summarized on the printed AVS.  New prescriptions  were notordered during this visit . Belongings checklist reviewed with patient and belongings sent. The patient verbalizes understanding of discharge instructions and able to teach back instructions. Reason for discharge and necessary follow-up care with the patient's PCP as reviewed on the printed discharge instructions.          Goal Outcome Evaluation:      Plan of Care Reviewed With: patient, family          Outcome Evaluation: Pt.is dicharging today.    Problem: Adult Inpatient Plan of Care  Goal: Plan of Care Review  Description: The Plan of Care Review/Shift note should be completed every shift.  The Outcome Evaluation is a brief statement about your assessment that the patient is improving, declining, or no change.  This information will be displayed automatically on your shift  note.  Outcome: Met  Flowsheets (Taken 9/7/2024 1115)  Outcome Evaluation: Pt.is dicharging today.  Plan of Care Reviewed With:   patient   family  Goal: Patient-Specific Goal (Individualized)  Description: You can add care plan individualizations to a care plan. Examples of Individualization might be:  \"Parent requests to be called daily at 9am for status\", \"I have a hard time hearing out of my right ear\", or \"Do not touch me to wake me up as it startles  me\".  Outcome: Met  Goal: Absence of Hospital-Acquired Illness or Injury  Outcome: Met  Intervention: Identify and Manage Fall Risk  Recent Flowsheet Documentation  Taken 9/7/2024 0800 by Suleiman Villarreal, RN  Safety Promotion/Fall Prevention:   activity supervised   assistive device/personal items within reach   lighting adjusted   nonskid shoes/slippers when out of bed   patient and family education   room organization consistent   safety round/check completed  Intervention: " Prevent Skin Injury  Recent Flowsheet Documentation  Taken 9/7/2024 0800 by Suleiman Villarreal RN  Body Position: position changed independently  Intervention: Prevent and Manage VTE (Venous Thromboembolism) Risk  Recent Flowsheet Documentation  Taken 9/7/2024 0800 by Suleiman Villarreal RN  VTE Prevention/Management: (Eliquis) other (see comments)  Intervention: Prevent Infection  Recent Flowsheet Documentation  Taken 9/7/2024 0800 by Suleiman Villarreal RN  Infection Prevention:   hand hygiene promoted   equipment surfaces disinfected  Goal: Optimal Comfort and Wellbeing  Outcome: Met  Intervention: Provide Person-Centered Care  Recent Flowsheet Documentation  Taken 9/7/2024 0800 by Suleiman Villarreal RN  Trust Relationship/Rapport:   care explained   choices provided   emotional support provided   empathic listening provided   questions answered   questions encouraged  Goal: Readiness for Transition of Care  Outcome: Met     Problem: Comorbidity Management  Goal: Maintenance of Heart Failure Symptom Control  Outcome: Met  Intervention: Maintain Heart Failure Management  Recent Flowsheet Documentation  Taken 9/7/2024 0800 by Suleiman Villarreal RN  Medication Review/Management: medications reviewed  Goal: Blood Glucose Levels Within Targeted Range  Outcome: Met  Intervention: Monitor and Manage Glycemia  Recent Flowsheet Documentation  Taken 9/7/2024 0800 by Suleiman Villarreal RN  Glycemic Management: blood glucose monitored  Medication Review/Management: medications reviewed     Problem: Bleeding Risk or Actual (Thrombocytopenia)  Goal: Absence of Bleeding  Outcome: Met  Intervention: Minimize Bleeding Risk  Recent Flowsheet Documentation  Taken 9/7/2024 0800 by Suleiman Villarreal RN  Bleeding Precautions: monitored for signs of bleeding

## 2024-09-07 NOTE — PROGRESS NOTES
Care Management Discharge Note    Discharge Date: 09/07/2024       Discharge Disposition: Skilled Nursing Facility    Discharge Transportation:  pt's sister    Private pay costs discussed: Not applicable    Does the patient's insurance plan have a 3 day qualifying hospital stay waiver?  No    PAS Confirmation Code: [id number]  Patient/family educated on Medicare website which has current facility and service quality ratings:  yes    Education Provided on the Discharge Plan:  yes  Persons Notified of Discharge Plans: [facility], pt/family, bedside nurse  Patient/Family in Agreement with the Plan: yes    Handoff Referral Completed: No, handoff not indicated or clinically appropriate    Additional Information:  Chart reviewed. Pt will be discharging to [facility] today with pt's sister transporting at 11am.  SW faxed discharge orders to [facility] and spoke with their CC to confirm discharge.     Jimena OROSCO, [facility]  Inpatient Care Coordination   [facility]   [phone]

## 2024-09-09 ENCOUNTER — DOCUMENTATION ONLY (OUTPATIENT)
Dept: ANTICOAGULATION | Facility: CLINIC | Age: 84
End: 2024-09-09

## 2024-09-09 ENCOUNTER — TRANSITIONAL CARE UNIT VISIT (OUTPATIENT)
Dept: GERIATRICS | Facility: CLINIC | Age: 84
End: 2024-09-09
Payer: COMMERCIAL

## 2024-09-09 ENCOUNTER — PATIENT OUTREACH (OUTPATIENT)
Dept: ONCOLOGY | Facility: CLINIC | Age: 84
End: 2024-09-09

## 2024-09-09 ENCOUNTER — PATIENT OUTREACH (OUTPATIENT)
Dept: CARE COORDINATION | Facility: CLINIC | Age: 84
End: 2024-09-09

## 2024-09-09 VITALS
BODY MASS INDEX: 35.07 KG/M2 | DIASTOLIC BLOOD PRESSURE: 68 MMHG | HEART RATE: 70 BPM | OXYGEN SATURATION: 95 % | RESPIRATION RATE: 18 BRPM | TEMPERATURE: 97.2 F | SYSTOLIC BLOOD PRESSURE: 129 MMHG | WEIGHT: 231.4 LBS | HEIGHT: 68 IN

## 2024-09-09 DIAGNOSIS — N39.0 RECURRENT UTI: ICD-10-CM

## 2024-09-09 DIAGNOSIS — D69.6 THROMBOCYTOPENIA (H): Primary | ICD-10-CM

## 2024-09-09 DIAGNOSIS — N18.31 TYPE 2 DIABETES MELLITUS WITH STAGE 3A CHRONIC KIDNEY DISEASE, WITHOUT LONG-TERM CURRENT USE OF INSULIN (H): ICD-10-CM

## 2024-09-09 DIAGNOSIS — K21.00 GASTROESOPHAGEAL REFLUX DISEASE WITH ESOPHAGITIS, UNSPECIFIED WHETHER HEMORRHAGE: ICD-10-CM

## 2024-09-09 DIAGNOSIS — I48.21 PERMANENT ATRIAL FIBRILLATION (H): Primary | ICD-10-CM

## 2024-09-09 DIAGNOSIS — I48.20 CHRONIC ATRIAL FIBRILLATION (H): ICD-10-CM

## 2024-09-09 DIAGNOSIS — E11.22 TYPE 2 DIABETES MELLITUS WITH STAGE 3A CHRONIC KIDNEY DISEASE, WITHOUT LONG-TERM CURRENT USE OF INSULIN (H): ICD-10-CM

## 2024-09-09 DIAGNOSIS — K74.60 CIRRHOSIS OF LIVER WITHOUT ASCITES, UNSPECIFIED HEPATIC CIRRHOSIS TYPE (H): ICD-10-CM

## 2024-09-09 DIAGNOSIS — R53.81 PHYSICAL DECONDITIONING: ICD-10-CM

## 2024-09-09 DIAGNOSIS — G93.40 ACUTE ENCEPHALOPATHY: Chronic | ICD-10-CM

## 2024-09-09 PROCEDURE — 86481 TB AG RESPONSE T-CELL SUSP: CPT | Performed by: PHYSICIAN ASSISTANT

## 2024-09-09 PROCEDURE — 99310 SBSQ NF CARE HIGH MDM 45: CPT | Performed by: PHYSICIAN ASSISTANT

## 2024-09-09 PROCEDURE — 36415 COLL VENOUS BLD VENIPUNCTURE: CPT | Performed by: PHYSICIAN ASSISTANT

## 2024-09-09 PROCEDURE — P9604 ONE-WAY ALLOW PRORATED TRIP: HCPCS | Performed by: PHYSICIAN ASSISTANT

## 2024-09-09 NOTE — LETTER
9/9/2024      Savanna Rehman  [address]  [facility] [address]        [facility] GERIATRICS    PRIMARY CARE PROVIDER AND CLINIC:  Taylor Payan MD, [address] / [facility] [address]  Chief Complaint   Patient presents with     Hospital F/U      [facility] Medical Record Number:  [id number]  Place of Service where encounter took place:  [facility] [[id number]]    Savanna Rehman  is a 83 year old  (1940), admitted to the above facility from  [facility]. Hospital stay 8/30/24 through 9/7/24..   HPI:    Notes from hospitalization reviewed including H&P Hematology consult, pharmacy consult and D/c summary    Savanna Rehman is a pleasant 83-year-old female with a past medical history of atrial fibrillation, HFpEF, migraines, fibromyalgia and cirrhosis who was recently hospitalized at [facility].  Presented with petechial rash and bruising.  Found to have severe thrombocytopenia with platelets of 2000.  Hematology was consulted and she was treated with 3 units platelets, IVIG and Decadron.  Platelets normalized prior to discharge.  Did require some IV diuresis due to volume resuscitation.  Additionally some confusion and hyperglycemia with Decadron that improved prior to discharge    Savanna Burton.  Just finished working with occupational therapy.  Reviewed her recent hospitalization.  No bleeding or bruising.  Discussed medication changes.  She does feel weaker than baseline.  No dizziness or lightheadedness.  No chest pain or shortness of breath.  No constipation.  Lives in ILF    Message left for daughter to introduce self/role and dicuss POC      Review of nursing home EMR: -129    CODE STATUS/ADVANCE DIRECTIVES DISCUSSION:  No CPR- Do NOT Intubate  DNR / DNI  ALLERGIES:   Allergies   Allergen Reactions     Augmented Betamethasone Diprop [Betamethasone] Other (See Comments)     Severe yeast infection     Petrolatum  Anaphylaxis and Swelling     Rash and swelling     Shellfish-Derived Products Anaphylaxis     Tongue swelling     Aspirin Swelling     tongue swelling     Bacitracin      Rash swelling     Bactrim [Sulfamethoxazole-Trimethoprim] Dizziness     Darvon [Propoxyphene] Swelling     Throat closes     Dilaudid [Hydromorphone]      No side effects from fentanyl June 2023     Levaquin [Levofloxacin] Swelling     Tongue swelling     Lidocaine      Facial swelling      Morphine Unknown     Per [facility] 2/23/24     Neomycin Swelling     rash     Neosporin [Neomycin-Polymyxin-Gramicidin] Swelling     rash     Nitrofurantoin      SOB, GI upset,     Oxycodone      Severe itching     Percocet [Oxycodone-Acetaminophen] Unknown     Percodan [Oxycodone-Aspirin]      Severe itching     Polymyxin B      Pramoxine      Tramadol      Vicodin [Hydrocodone-Acetaminophen]      Severe itching       Xarelto [Rivaroxaban]      Adhesive Tape Rash     Band aids      Codeine Rash     Hydrocortisone Rash and Swelling     Other Environmental Allergy Rash     Adhesive tape   Band aids       PAST MEDICAL HISTORY:   Past Medical History:   Diagnosis Date     Acute encephalopathy 09/21/2023     Anemia     Iron Deficiency anemia     Atrial fibrillation (H)      CAD (coronary artery disease)     non-obstructive     Chronic pain     neck, low back, legs     Congestive heart failure (H)      Degenerative disk disease      Diabetes (H)      Fibromyalgia      Gastro-oesophageal reflux disease      Gout      Hiatal hernia      Mumps      Neuropathy      CRISTIANA (obstructive sleep apnea) - CPAP      Palpitations      Pernicious anemia      Sleep apnea     uses CPAP.     Urinary incontinence      Vitamin D deficiency       PAST SURGICAL HISTORY:   has a past surgical history that includes Cholecystectomy; Hysterectomy total abdominal; appendectomy; colonoscopy (3/15/2011); Laparoscopic nissen fundoplication (N/A, 2/4/2015); Heart Cath Left heart cath  (12/30/16); Coronary Angiography Adult Order; Tonsillectomy; Knee replacement NOS (Left); Transposition ulnar nerve (elbow); Cystoscopy, biopsy bladder, combined (N/A, 7/13/2020); Colonoscopy (N/A, 3/15/2023); Colonoscopy (N/A, 3/15/2023); Pacemaker Device & Lead Implant - Right Atrial & Right Ventricular (Left, 4/5/2024); and Esophagoscopy, gastroscopy, duodenoscopy (EGD), combined (N/A, 8/12/2024).  FAMILY HISTORY: family history includes Alzheimer Disease in her mother; Lung Cancer in her father; No Known Problems in her brother, brother, and brother.  SOCIAL HISTORY:   reports that she quit smoking about 10 years ago. Her smoking use included cigarettes. She started smoking about 60 years ago. She has a 25 pack-year smoking history. She has been exposed to tobacco smoke. She has never used smokeless tobacco. She reports that she does not currently use alcohol. She reports that she does not use drugs.  Patient's living condition: Lives alone in [facility]    Post Discharge Medication Reconciliation Status:   MED REC REQUIRED  Post Medication Reconciliation Status: discharge medications reconciled and changed, per note/orders       Current Outpatient Medications   Medication Sig Dispense Refill     acetaminophen (TYLENOL) 500 MG tablet Take 1,000 mg by mouth every 6 hours as needed for mild pain       apixaban ANTICOAGULANT (ELIQUIS) 5 MG tablet Take 1 tablet (5 mg) by mouth 2 times daily.       butalbital-aspirin-caffeine (FIORINAL) -40 MG capsule TAKE 1 CAPSULE BY MOUTH EVERY 4 HOURS AS NEEDED FOR HEADACHES OR  MIGRAINE 10 capsule 0     cholecalciferol (VITAMIN D3) 10 mcg (400 units) TABS tablet Take 10 mcg by mouth daily.       EPINEPHrine (ANY BX GENERIC EQUIV) 0.3 MG/0.3ML injection 2-pack Inject 0.3 mLs (0.3 mg) into the muscle as needed for anaphylaxis May repeat one time in 5-15 minutes if response to initial dose is inadequate. 2 each 3     escitalopram (LEXAPRO) 10 MG tablet Take 1 tablet (10 mg) by  "mouth at bedtime 90 tablet 1     famotidine (PEPCID) 10 MG tablet Take 1 tablet (10 mg) by mouth 2 times daily as needed (Heart burn).       furosemide (LASIX) 40 MG tablet Take 1 tablet (40 mg) by mouth daily for 90 days 90 tablet 0     glipiZIDE (GLUCOTROL XL) 2.5 MG 24 hr tablet Take 1 tablet by mouth twice daily 180 tablet 3     loperamide (IMODIUM) 2 MG capsule TAKE 1 CAPSULE BY MOUTH 4 TIMES DAILY AS NEEDED FOR DIARRHEA 60 capsule 0     melatonin 3 MG tablet Take 3 mg by mouth nightly as needed for sleep.       No current facility-administered medications for this visit.       ROS:  10 point ROS of systems including Constitutional, Eyes, Respiratory, Cardiovascular, Gastroenterology, Genitourinary, Integumentary, Musculoskeletal, Psychiatric were all negative except for pertinent positives noted in my HPI.    Vitals:  /68   Pulse 70   Temp 97.2  F (36.2  C)   Resp 18   Ht 1.727 m (5' 8\")   Wt 105 kg (231 lb 6.4 oz)   LMP  (LMP Unknown)   SpO2 95%   BMI 35.18 kg/m    Exam:  Physical Exam  Vitals (Facility EMR) reviewed.   Constitutional:       General: She is not in acute distress.  HENT:      Head: Normocephalic and atraumatic.   Eyes:      General: No scleral icterus.  Cardiovascular:      Rate and Rhythm: Normal rate and regular rhythm.      Heart sounds: No murmur heard.  Pulmonary:      Effort: Pulmonary effort is normal.      Breath sounds: No wheezing.   Abdominal:      General: There is no distension.      Tenderness: There is no abdominal tenderness.   Musculoskeletal:      Right lower leg: No edema.      Left lower leg: No edema.   Skin:     General: Skin is warm and dry.      Findings: Bruising present. No rash.   Neurological:      Mental Status: She is alert. Mental status is at baseline.   Psychiatric:         Behavior: Behavior normal.       Lab/Diagnostic data:  Recent labs in [facility] reviewed by me today.  and Most Recent 3 CBC's:  Recent Labs   Lab Test 09/07/24  0646 09/06/24  0805 " 09/05/24  0659   WBC 8.1 6.7 5.1   HGB 14.6 13.6 13.8   MCV 91 91 89    188 152     Most Recent 3 BMP's:  Recent Labs   Lab Test 09/07/24  0652 09/07/24  0646 09/07/24  0203 09/03/24  0747 09/03/24  0652 09/01/24  0707 09/01/24  0615   NA  --  136  --   --  135  --  134*   POTASSIUM  --  3.9  --   --  3.7  --  4.0   CHLORIDE  --  96*  --   --  99  --  93*   CO2  --  29  --   --  25  --  27   BUN  --  44.9*  --   --  38.6*  --  23.4*   CR  --  1.17*  --   --  0.95  --  1.03*   ANIONGAP  --  11  --   --  11  --  14   SAUL  --  9.1  --   --  8.9  --  9.4   * 149* 126*   < > 230*   < > 245*    < > = values in this interval not displayed.     Most Recent 2 LFT's:  Recent Labs   Lab Test 08/31/24  0714 08/30/24  1718   AST 46* 40   ALT 30 34   ALKPHOS 113 135   BILITOTAL 1.9* 1.2     Most Recent TSH and T4:  Recent Labs   Lab Test 07/05/24  1527   TSH 2.63     Most Recent Hemoglobin A1c:  Recent Labs   Lab Test 07/05/24  1547   A1C 6.3*     Most Recent ESR & CRP:No lab results found.  Most Recent Anemia Panel:  Recent Labs   Lab Test 09/07/24  0646 09/01/24  1120 09/01/24  0615 08/30/24  1718 07/05/24  1527 01/27/20  1025 11/16/19  0841   WBC 8.1   < > 3.1*   < > 5.2   < > 6.7   HGB 14.6   < > 12.8   < > 13.5   < > 14.9   HCT 43.5   < > 37.1   < > 39.6   < > 45.4   MCV 91   < > 89   < > 92   < > 92      < > 17*   < > 131*   < > 227   IRON  --   --   --   --   --   --  106   IRONSAT  --   --   --   --   --   --  32   RETICABSCT  --   --  0.098*  --   --    < >  --    RETP  --   --  2.4*  --   --    < >  --    FEB  --   --   --   --   --   --  333   ARMAND  --   --   --   --   --   --  115   B12  --   --   --   --  662   < > 519   FOLIC  --   --   --   --  9.3   < > 7.8    < > = values in this interval not displayed.       ASSESSMENT/PLAN:  Severe thrombocytopenia  ITP  Chronic thrombocytopenia: Platelets 2 on admission.  Improved to 165 at discharge after IV IVIG and Decadron.  Baseline   - No  current symptoms of bleeding.  Does have various bruises that are healing  - Continues on anticoagulation per hematology's recommendation  - Has oncology follow-up 9/11.  Will hold on labs at TCU as suspect she will have labs at this appointment    Steroid-induced encephalopathy: Hospital notes indicate some confusion while receiving Decadron.  Seem to improve throughout hospital stay.  Currently very pleasant.  - Lives in ILF  - OT and social work consulted. [facility]UMS at [facility] 17/30    Klebsiella UTI, resolved: Completed course of ceftriaxone    Type 2 diabetes  Steroid-induced hyperglycemia: A1C 6.3.  Received Lantus during hospital stay but discontinued on discharge.  Continues on home glipizide  - Continue glipizide 2.5 mg twice daily  - Monitor blood glucoses twice daily    Atrial fibrillation s/p PPM:  -Not on rate controlling medications  - Warfarin transition to apixaban    HFpEF: EF 55-60%, 2+ TR:  - Continue Lasix  - Monitor volume status    GERD  -Historically on Protonix but transition to Pepcid due to thrombocytopenia    CKD:Estimated Creatinine Clearance: 47.8 mL/min (A) (based on SCr of 1.17 mg/dL (H)).  -Creatinine at baseline  - Monitor as clinically indicated    Cirrhosis: Follows with [facility]  - Continue furosemide    Physical deconditioning:   -PT/OT/social work      Electronically signed by:  Penny Chen PA-C     A total 47 min were spent on this hospital follow visit including reviewing hospitalization, medication reconciliation, evaluating patient and discussing plan of care, writing orders and documenting. All services performed on day of visit        This note was completed in part using Dragon voice recognition software. Although reviewed after completion, some word and grammatical errors may occur.               Sincerely,        Penny Chen PA-C

## 2024-09-09 NOTE — PROGRESS NOTES
[facility] GERIATRICS    PRIMARY CARE PROVIDER AND CLINIC:  Taylor Payan MD, [address] / [facility] [address]  Chief Complaint   Patient presents with    Hospital F/U      [place] Medical Record Number:  [id number]  Place of Service where encounter took place:  [facility] [[id number]]    Savanna Rehman  is a 83 year old  (1940), admitted to the above facility from  [facility]. Hospital stay 8/30/24 through 9/7/24..   HPI:    Notes from hospitalization reviewed including H&P Hematology consult, pharmacy consult and D/c summary    Savanna Rehman is a pleasant 83-year-old female with a past medical history of atrial fibrillation, HFpEF, migraines, fibromyalgia and cirrhosis who was recently hospitalized at [facility].  Presented with petechial rash and bruising.  Found to have severe thrombocytopenia with platelets of 2000.  Hematology was consulted and she was treated with 3 units platelets, IVIG and Decadron.  Platelets normalized prior to discharge.  Did require some IV diuresis due to volume resuscitation.  Additionally some confusion and hyperglycemia with Decadron that improved prior to discharge    Savanna Burton.  Just finished working with occupational therapy.  Reviewed her recent hospitalization.  No bleeding or bruising.  Discussed medication changes.  She does feel weaker than baseline.  No dizziness or lightheadedness.  No chest pain or shortness of breath.  No constipation.  Lives in ILF    Message left for daughter to introduce self/role and dicuss POC      Review of nursing home EMR: -129    CODE STATUS/ADVANCE DIRECTIVES DISCUSSION:  No CPR- Do NOT Intubate  DNR / DNI  ALLERGIES:   Allergies   Allergen Reactions    Augmented Betamethasone Diprop [Betamethasone] Other (See Comments)     Severe yeast infection    Petrolatum Anaphylaxis and Swelling     Rash and swelling    Shellfish-Derived Products Anaphylaxis     Tongue  swelling    Aspirin Swelling     tongue swelling    Bacitracin      Rash swelling    Bactrim [Sulfamethoxazole-Trimethoprim] Dizziness    Darvon [Propoxyphene] Swelling     Throat closes    Dilaudid [Hydromorphone]      No side effects from fentanyl June 2023    Levaquin [Levofloxacin] Swelling     Tongue swelling    Lidocaine      Facial swelling     Morphine Unknown     [facility] 2/23/24    Neomycin Swelling     rash    Neosporin [Neomycin-Polymyxin-Gramicidin] Swelling     rash    Nitrofurantoin      SOB, GI upset,    Oxycodone      Severe itching    Percocet [Oxycodone-Acetaminophen] Unknown    Percodan [Oxycodone-Aspirin]      Severe itching    Polymyxin B     Pramoxine     Tramadol     Vicodin [Hydrocodone-Acetaminophen]      Severe itching      Xarelto [Rivaroxaban]     Adhesive Tape Rash     Band aids     Codeine Rash    Hydrocortisone Rash and Swelling    Other Environmental Allergy Rash     Adhesive tape   Band aids       PAST MEDICAL HISTORY:   Past Medical History:   Diagnosis Date    Acute encephalopathy 09/21/2023    Anemia     Iron Deficiency anemia    Atrial fibrillation (H)     CAD (coronary artery disease)     non-obstructive    Chronic pain     neck, low back, legs    Congestive heart failure (H)     Degenerative disk disease     Diabetes (H)     Fibromyalgia     Gastro-oesophageal reflux disease     Gout     Hiatal hernia     Mumps     Neuropathy     CRISTIANA (obstructive sleep apnea) - CPAP     Palpitations     Pernicious anemia     Sleep apnea     uses CPAP.    Urinary incontinence     Vitamin D deficiency       PAST SURGICAL HISTORY:   has a past surgical history that includes Cholecystectomy; Hysterectomy total abdominal; appendectomy; colonoscopy (3/15/2011); Laparoscopic nissen fundoplication (N/A, 2/4/2015); Heart Cath Left heart cath (12/30/16); Coronary Angiography Adult Order; Tonsillectomy; Knee replacement NOS (Left); Transposition ulnar nerve (elbow); Cystoscopy, biopsy  bladder, combined (N/A, 7/13/2020); Colonoscopy (N/A, 3/15/2023); Colonoscopy (N/A, 3/15/2023); Pacemaker Device & Lead Implant - Right Atrial & Right Ventricular (Left, 4/5/2024); and Esophagoscopy, gastroscopy, duodenoscopy (EGD), combined (N/A, 8/12/2024).  FAMILY HISTORY: family history includes Alzheimer Disease in her mother; Lung Cancer in her father; No Known Problems in her brother, brother, and brother.  SOCIAL HISTORY:   reports that she quit smoking about 10 years ago. Her smoking use included cigarettes. She started smoking about 60 years ago. She has a 25 pack-year smoking history. She has been exposed to tobacco smoke. She has never used smokeless tobacco. She reports that she does not currently use alcohol. She reports that she does not use drugs.  Patient's living condition: Lives alone in [facility]    Post Discharge Medication Reconciliation Status:   MED REC REQUIRED  Post Medication Reconciliation Status: discharge medications reconciled and changed, per note/orders       Current Outpatient Medications   Medication Sig Dispense Refill    acetaminophen (TYLENOL) 500 MG tablet Take 1,000 mg by mouth every 6 hours as needed for mild pain      apixaban ANTICOAGULANT (ELIQUIS) 5 MG tablet Take 1 tablet (5 mg) by mouth 2 times daily.      butalbital-aspirin-caffeine (FIORINAL) -40 MG capsule TAKE 1 CAPSULE BY MOUTH EVERY 4 HOURS AS NEEDED FOR HEADACHES OR  MIGRAINE 10 capsule 0    cholecalciferol (VITAMIN D3) 10 mcg (400 units) TABS tablet Take 10 mcg by mouth daily.      EPINEPHrine (ANY BX GENERIC EQUIV) 0.3 MG/0.3ML injection 2-pack Inject 0.3 mLs (0.3 mg) into the muscle as needed for anaphylaxis May repeat one time in 5-15 minutes if response to initial dose is inadequate. 2 each 3    escitalopram (LEXAPRO) 10 MG tablet Take 1 tablet (10 mg) by mouth at bedtime 90 tablet 1    famotidine (PEPCID) 10 MG tablet Take 1 tablet (10 mg) by mouth 2 times daily as needed (Heart burn).      furosemide  "(LASIX) 40 MG tablet Take 1 tablet (40 mg) by mouth daily for 90 days 90 tablet 0    glipiZIDE (GLUCOTROL XL) 2.5 MG 24 hr tablet Take 1 tablet by mouth twice daily 180 tablet 3    loperamide (IMODIUM) 2 MG capsule TAKE 1 CAPSULE BY MOUTH 4 TIMES DAILY AS NEEDED FOR DIARRHEA 60 capsule 0    melatonin 3 MG tablet Take 3 mg by mouth nightly as needed for sleep.       No current facility-administered medications for this visit.       ROS:  10 point ROS of systems including Constitutional, Eyes, Respiratory, Cardiovascular, Gastroenterology, Genitourinary, Integumentary, Musculoskeletal, Psychiatric were all negative except for pertinent positives noted in my HPI.    Vitals:  /68   Pulse 70   Temp 97.2  F (36.2  C)   Resp 18   Ht 1.727 m (5' 8\")   Wt 105 kg (231 lb 6.4 oz)   LMP  (LMP Unknown)   SpO2 95%   BMI 35.18 kg/m    Exam:  Physical Exam  Vitals (Facility EMR) reviewed.   Constitutional:       General: She is not in acute distress.  HENT:      Head: Normocephalic and atraumatic.   Eyes:      General: No scleral icterus.  Cardiovascular:      Rate and Rhythm: Normal rate and regular rhythm.      Heart sounds: No murmur heard.  Pulmonary:      Effort: Pulmonary effort is normal.      Breath sounds: No wheezing.   Abdominal:      General: There is no distension.      Tenderness: There is no abdominal tenderness.   Musculoskeletal:      Right lower leg: No edema.      Left lower leg: No edema.   Skin:     General: Skin is warm and dry.      Findings: Bruising present. No rash.   Neurological:      Mental Status: She is alert. Mental status is at baseline.   Psychiatric:         Behavior: Behavior normal.       Lab/Diagnostic data:  Recent labs in [facility] reviewed by me today.  and Most Recent 3 CBC's:  Recent Labs   Lab Test 09/07/24  0646 09/06/24  0805 09/05/24  0659   WBC 8.1 6.7 5.1   HGB 14.6 13.6 13.8   MCV 91 91 89    188 152     Most Recent 3 BMP's:  Recent Labs   Lab Test 09/07/24  0652 " 09/07/24  0646 09/07/24  0203 09/03/24  0747 09/03/24  0652 09/01/24  0707 09/01/24  0615   NA  --  136  --   --  135  --  134*   POTASSIUM  --  3.9  --   --  3.7  --  4.0   CHLORIDE  --  96*  --   --  99  --  93*   CO2  --  29  --   --  25  --  27   BUN  --  44.9*  --   --  38.6*  --  23.4*   CR  --  1.17*  --   --  0.95  --  1.03*   ANIONGAP  --  11  --   --  11  --  14   SAUL  --  9.1  --   --  8.9  --  9.4   * 149* 126*   < > 230*   < > 245*    < > = values in this interval not displayed.     Most Recent 2 LFT's:  Recent Labs   Lab Test 08/31/24  0714 08/30/24  1718   AST 46* 40   ALT 30 34   ALKPHOS 113 135   BILITOTAL 1.9* 1.2     Most Recent TSH and T4:  Recent Labs   Lab Test 07/05/24  1527   TSH 2.63     Most Recent Hemoglobin A1c:  Recent Labs   Lab Test 07/05/24  1547   A1C 6.3*     Most Recent ESR & CRP:No lab results found.  Most Recent Anemia Panel:  Recent Labs   Lab Test 09/07/24  0646 09/01/24  1120 09/01/24  0615 08/30/24  1718 07/05/24  1527 01/27/20  1025 11/16/19  0841   WBC 8.1   < > 3.1*   < > 5.2   < > 6.7   HGB 14.6   < > 12.8   < > 13.5   < > 14.9   HCT 43.5   < > 37.1   < > 39.6   < > 45.4   MCV 91   < > 89   < > 92   < > 92      < > 17*   < > 131*   < > 227   IRON  --   --   --   --   --   --  106   IRONSAT  --   --   --   --   --   --  32   RETICABSCT  --   --  0.098*  --   --    < >  --    RETP  --   --  2.4*  --   --    < >  --    FEB  --   --   --   --   --   --  333   ARMAND  --   --   --   --   --   --  115   B12  --   --   --   --  662   < > 519   FOLIC  --   --   --   --  9.3   < > 7.8    < > = values in this interval not displayed.       ASSESSMENT/PLAN:  Severe thrombocytopenia  ITP  Chronic thrombocytopenia: Platelets 2 on admission.  Improved to 165 at discharge after IV IVIG and Decadron.  Baseline   - No current symptoms of bleeding.  Does have various bruises that are healing  - Continues on anticoagulation per hematology's recommendation  - Has oncology  follow-up 9/11.  Will hold on labs at TCU as suspect she will have labs at this appointment    Steroid-induced encephalopathy: Hospital notes indicate some confusion while receiving Decadron.  Seem to improve throughout hospital stay.  Currently very pleasant.  - Lives in ILF  - OT and social work consulted. SLUMS at TCU 17/30    Klebsiella UTI, resolved: Completed course of ceftriaxone    Type 2 diabetes  Steroid-induced hyperglycemia: A1C 6.3.  Received Lantus during hospital stay but discontinued on discharge.  Continues on home glipizide  - Continue glipizide 2.5 mg twice daily  - Monitor blood glucoses twice daily    Atrial fibrillation s/p PPM:  -Not on rate controlling medications  - Warfarin transition to apixaban    HFpEF: EF 55-60%, 2+ TR:  - Continue Lasix  - Monitor volume status    GERD  -Historically on Protonix but transition to Pepcid due to thrombocytopenia    CKD:Estimated Creatinine Clearance: 47.8 mL/min (A) (based on SCr of 1.17 mg/dL (H)).  -Creatinine at baseline  - Monitor as clinically indicated    Cirrhosis: Follows with [facility]  - Continue furosemide    Physical deconditioning:   -PT/OT/social work      Electronically signed by:  Penny Chen PA-C     A total 47 min were spent on this hospital follow visit including reviewing hospitalization, medication reconciliation, evaluating patient and discussing plan of care, writing orders and documenting. All services performed on day of visit        This note was completed in part using Dragon voice recognition software. Although reviewed after completion, some word and grammatical errors may occur.

## 2024-09-09 NOTE — PROGRESS NOTES
Jacquesr returned call to Maile,  at patient's residence facility, to update their team that patient will have CBC drawn prior to her appointment with Dr. Pompa on 9/11/24. Writer relayed that Dr. Pompa may order additional testing after patient's appointment with him, but that will be determined at the time of the visit. No further questions/concerns from their team at this time.     Nafisa Fernandez RN, BSN, PHN, OCN     9/9/2024 at 10:56 AM  Nurse Care Coordinator  [facility]  P: [phone]   F: [phone]

## 2024-09-09 NOTE — PROGRESS NOTES
Connected Care Resource Center    Background: Transitional Care Management program identified per system criteria and reviewed by [facility] team for possible outreach.    Assessment: Upon chart review, [facility] Team member will not proceed with patient outreach related to this episode of Transitional Care Management program due to reason below:    Patient has discharged to a Memory Care, Long-term Care, Assisted Living or Group Home where patient is receiving on-site support with their daily cares, including support with hospital follow up plan.    Plan: Transitional Care Management episode addressed appropriately per reason noted above.      Dary Kim MA  Connected Care Resource [place], [facility]    *Connected Care Resource Team does NOT follow patient ongoing. Referrals are identified based on internal discharge reports and the outreach is to ensure patient has an understanding of their discharge instructions.

## 2024-09-09 NOTE — PROGRESS NOTES
"ANTICOAGULATION  MANAGEMENT: Discharge Review    Savanna Rehman chart reviewed for anticoagulation continuity of care    Hospital Admission on 8/30-9/7/24 for Physical Deconditioning.    Discharge disposition: TCU    Results:    No results for input(s): \"INR\", \"PRXEFS11MPHM\", \"F2\", \"ALMWH\", \"AAUFH\" in the last 168 hours.  Anticoagulation inpatient management:     Warfarin discontinued, patient started on eliquis         Medication changes affecting anticoagulation: Yes: Started on eliquis    Additional factors affecting anticoagulation: No     PLAN     ANTICOAGULATION  MANAGEMENT    Savanna Rehman is being discharged from the [facility] ([facility]).    Reason for discharge: warfarin replaced by alternate therapy, Eliquis    Anticoagulation episode resolved, ACC referral closed, and Standing order discontinued    If patient needs warfarin management in the future, please send a new referral    Iris Kemp RN     "

## 2024-09-10 LAB
GAMMA INTERFERON BACKGROUND BLD IA-ACNC: 0.01 IU/ML
M TB IFN-G BLD-IMP: NEGATIVE
M TB IFN-G CD4+ BCKGRND COR BLD-ACNC: 9.99 IU/ML
MITOGEN IGNF BCKGRD COR BLD-ACNC: 0 IU/ML
MITOGEN IGNF BCKGRD COR BLD-ACNC: 0 IU/ML
QUANTIFERON MITOGEN: 10 IU/ML
QUANTIFERON NIL TUBE: 0.01 IU/ML
QUANTIFERON TB1 TUBE: 0.01 IU/ML
QUANTIFERON TB2 TUBE: 0.01

## 2024-09-11 ENCOUNTER — DOCUMENTATION ONLY (OUTPATIENT)
Dept: GERIATRICS | Facility: CLINIC | Age: 84
End: 2024-09-11
Payer: COMMERCIAL

## 2024-09-12 ENCOUNTER — TELEPHONE (OUTPATIENT)
Dept: GERIATRICS | Facility: CLINIC | Age: 84
End: 2024-09-12

## 2024-09-12 ENCOUNTER — LAB REQUISITION (OUTPATIENT)
Dept: LAB | Facility: CLINIC | Age: 84
End: 2024-09-12
Payer: COMMERCIAL

## 2024-09-12 ENCOUNTER — TRANSITIONAL CARE UNIT VISIT (OUTPATIENT)
Dept: GERIATRICS | Facility: CLINIC | Age: 84
End: 2024-09-12
Payer: COMMERCIAL

## 2024-09-12 VITALS
SYSTOLIC BLOOD PRESSURE: 99 MMHG | TEMPERATURE: 97.9 F | HEIGHT: 68 IN | WEIGHT: 232 LBS | DIASTOLIC BLOOD PRESSURE: 69 MMHG | HEART RATE: 79 BPM | RESPIRATION RATE: 18 BRPM | BODY MASS INDEX: 35.16 KG/M2 | OXYGEN SATURATION: 97 %

## 2024-09-12 DIAGNOSIS — N18.31 CHRONIC KIDNEY DISEASE, STAGE 3A (H): ICD-10-CM

## 2024-09-12 DIAGNOSIS — G43.009 MIGRAINE WITHOUT AURA AND WITHOUT STATUS MIGRAINOSUS, NOT INTRACTABLE: ICD-10-CM

## 2024-09-12 DIAGNOSIS — D69.6 THROMBOCYTOPENIA (H): Primary | ICD-10-CM

## 2024-09-12 DIAGNOSIS — N18.31 TYPE 2 DIABETES MELLITUS WITH STAGE 3A CHRONIC KIDNEY DISEASE, WITHOUT LONG-TERM CURRENT USE OF INSULIN (H): ICD-10-CM

## 2024-09-12 DIAGNOSIS — D69.6 THROMBOCYTOPENIA, UNSPECIFIED (H): ICD-10-CM

## 2024-09-12 DIAGNOSIS — G93.40 ACUTE ENCEPHALOPATHY: ICD-10-CM

## 2024-09-12 DIAGNOSIS — E11.22 TYPE 2 DIABETES MELLITUS WITH STAGE 3A CHRONIC KIDNEY DISEASE, WITHOUT LONG-TERM CURRENT USE OF INSULIN (H): ICD-10-CM

## 2024-09-12 PROCEDURE — 99309 SBSQ NF CARE MODERATE MDM 30: CPT | Performed by: PHYSICIAN ASSISTANT

## 2024-09-12 RX ORDER — ONDANSETRON 4 MG/1
4 TABLET, ORALLY DISINTEGRATING ORAL EVERY 6 HOURS PRN
COMMUNITY

## 2024-09-12 RX ORDER — BUTALBITAL, ASPIRIN, AND CAFFEINE 325; 50; 40 MG/1; MG/1; MG/1
1 CAPSULE ORAL EVERY 4 HOURS PRN
Qty: 10 CAPSULE | Refills: 0 | Status: SHIPPED | OUTPATIENT
Start: 2024-09-12

## 2024-09-12 NOTE — PROGRESS NOTES
"  Chief Complaint   Patient presents with    RECHECK       HPI:  Savanna Rehman is a 83 year old  (1940), who is being seen today for an episodic care visit at: [facility]) [[id number]].     Brief summary: Savanna Rehman is a pleasant 83-year-old female with a past medical history of atrial fibrillation, HFpEF, migraines, fibromyalgia and cirrhosis who was recently hospitalized at [facility].  Presented with petechial rash and bruising.  Found to have severe thrombocytopenia with platelets of 2000.  Hematology was consulted and she was treated with 3 units platelets, IVIG and Decadron.  Platelets normalized prior to discharge.  Did require some IV diuresis due to volume resuscitation.  Additionally some confusion and hyperglycemia with Decadron that improved prior to discharge    Today's concern is: Canceled hematology appointment yesterday as she did not want to pay for transportation and did not have a family member who could take her.  No current signs of bleeding/bruising.  Does not feel she is getting enough physical therapy.  Denies dizziness or lightheadedness.  No nausea or vomiting.        Review of nursing home EMR:SBP , Wt 232.0, -199      Allergies, and PMH/PSH reviewed in [facility] today.    REVIEW OF SYSTEMS:  4 point ROS including Respiratory, CV, GI and , other than that noted in the HPI,  is negative    Objective:   BP 99/69   Pulse 79   Temp 97.9  F (36.6  C)   Resp 18   Ht 1.727 m (5' 8\")   Wt 105.2 kg (232 lb)   LMP  (LMP Unknown)   SpO2 97%   BMI 35.28 kg/m      Physical Exam  Vitals (Facility EMR) reviewed.   Constitutional:       General: She is not in acute distress.  HENT:      Head: Normocephalic and atraumatic.   Eyes:      General: No scleral icterus.  Cardiovascular:      Rate and Rhythm: Normal rate and regular rhythm.      Heart sounds: No murmur heard.  Pulmonary:      Effort: Pulmonary effort is normal.      Breath sounds: No " wheezing.   Abdominal:      General: There is no distension.      Tenderness: There is no abdominal tenderness.   Skin:     General: Skin is warm and dry.      Comments: No new petechiae.  Healing bruising.   Neurological:      Mental Status: She is alert. Mental status is at baseline.   Psychiatric:         Behavior: Behavior normal.          MED REC REQUIRED  Post Medication Reconciliation Status: discharge medications reconciled, continue medications without change      Recent labs in [facility] reviewed by me today.  and Most Recent 3 CBC's:  Recent Labs   Lab Test 09/07/24  0646 09/06/24  0805 09/05/24  0659   WBC 8.1 6.7 5.1   HGB 14.6 13.6 13.8   MCV 91 91 89    188 152     Most Recent 3 BMP's:  Recent Labs   Lab Test 09/07/24  0652 09/07/24  0646 09/07/24  0203 09/03/24  0747 09/03/24  0652 09/01/24  0707 09/01/24  0615   NA  --  136  --   --  135  --  134*   POTASSIUM  --  3.9  --   --  3.7  --  4.0   CHLORIDE  --  96*  --   --  99  --  93*   CO2  --  29  --   --  25  --  27   BUN  --  44.9*  --   --  38.6*  --  23.4*   CR  --  1.17*  --   --  0.95  --  1.03*   ANIONGAP  --  11  --   --  11  --  14   SAUL  --  9.1  --   --  8.9  --  9.4   * 149* 126*   < > 230*   < > 245*    < > = values in this interval not displayed.       Assessment/Plan:  Severe thrombocytopenia  ITP  Chronic thrombocytopenia: Platelets 2 on admission.  Improved to 165 at discharge after IV IVIG and Decadron.  Baseline   - No current symptoms of bleeding.  Does have various bruises that are healing  - Continues on anticoagulation per hematology's recommendation  - Did not attend hematology appointment yesterday as planned due to transportation issue.  Will plan to obtain CBC with platelets tomorrow.  This was not initially ordered due to planned hematology follow-up    Steroid-induced encephalopathy: Hospital notes indicate some confusion while receiving Decadron.  Seem to improve throughout hospital stay.  Currently very  pleasant.  - Lives in ILF  - OT and social work consulted. SLUMS at TCU 17/30. CPT pending    Klebsiella UTI, resolved: Completed course of ceftriaxone    Type 2 diabetes  Steroid-induced hyperglycemia: A1C 6.3.  Received Lantus during hospital stay but discontinued on discharge.  Continues on home glipizide  - Continue glipizide 2.5 mg twice daily  - Monitor blood glucoses twice daily. Overall appropriate    Atrial fibrillation s/p PPM:  -Not on rate controlling medications  - Warfarin transition to apixaban    HFpEF: EF 55-60%, 2+ TR:  - Continue Lasix  - Monitor volume status    GERD  -Historically on Protonix but transition to Pepcid due to thrombocytopenia    CKD:Estimated Creatinine Clearance: 47.8 mL/min (A) (based on SCr of 1.17 mg/dL (H)).  -Creatinine at baseline  - BMP 9/13    Cirrhosis: Follows with [facility]  - Continue furosemide    Physical deconditioning:   -PT/OT/social work    Anxiety:   - Continue Effexor    Orders:  As above      Electronically signed by: Penny Chen PA-C

## 2024-09-12 NOTE — TELEPHONE ENCOUNTER
[facility] Geriatrics Triage Nurse Telephone Encounter    Provider: Penny Chen PA-C  Facility: [facility]  Facility Type:  TCU    Caller: Sneha     Allergies:    Allergies   Allergen Reactions    Augmented Betamethasone Diprop [Betamethasone] Other (See Comments)     Severe yeast infection    Petrolatum Anaphylaxis and Swelling     Rash and swelling    Shellfish-Derived Products Anaphylaxis     Tongue swelling    Aspirin Swelling     tongue swelling    Bacitracin      Rash swelling    Bactrim [Sulfamethoxazole-Trimethoprim] Dizziness    Darvon [Propoxyphene] Swelling     Throat closes    Dilaudid [Hydromorphone]      No side effects from fentanyl June 2023    Levaquin [Levofloxacin] Swelling     Tongue swelling    Lidocaine      Facial swelling     Morphine Unknown     Per [facility] 2/23/24    Neomycin Swelling     rash    Neosporin [Neomycin-Polymyxin-Gramicidin] Swelling     rash    Nitrofurantoin      SOB, GI upset,    Oxycodone      Severe itching    Percocet [Oxycodone-Acetaminophen] Unknown    Percodan [Oxycodone-Aspirin]      Severe itching    Polymyxin B     Pramoxine     Tramadol     Vicodin [Hydrocodone-Acetaminophen]      Severe itching      Xarelto [Rivaroxaban]     Adhesive Tape Rash     Band aids     Codeine Rash    Hydrocortisone Rash and Swelling    Other Environmental Allergy Rash     Adhesive tape   Band aids         Reason for call:   Pt has had 2 emesis today and 1 bout of diarrhea. Pt is nauseated and worried about her diarrhea mostly and would like imodium   /75, T 97.1, p 68, O2 95% Ra    She did have a migraine before lunch and is a little better after her tylenol.       Verbal Order/Direction given by Provider: Use the prn loperamide that she has ordered. Zofran 4mg ODT q6h prn for nausea.    Provider giving Order:  Penny Chen PA-C    Verbal Order given to: Sneha Cali RN

## 2024-09-12 NOTE — LETTER
" 9/12/2024      Savanna Rehman  [address]  [facility] [address]          Chief Complaint   Patient presents with     RECHECK       HPI:  Savanna Rehman is a 83 year old  (1940), who is being seen today for an episodic care visit at: [facility] ([facility]) [[id number]].     Brief summary: Savanna Rehman is a pleasant 83-year-old female with a past medical history of atrial fibrillation, HFpEF, migraines, fibromyalgia and cirrhosis who was recently hospitalized at [facility].  Presented with petechial rash and bruising.  Found to have severe thrombocytopenia with platelets of 2000.  Hematology was consulted and she was treated with 3 units platelets, IVIG and Decadron.  Platelets normalized prior to discharge.  Did require some IV diuresis due to volume resuscitation.  Additionally some confusion and hyperglycemia with Decadron that improved prior to discharge    Today's concern is: Canceled hematology appointment yesterday as she did not want to pay for transportation and did not have a family member who could take her.  No current signs of bleeding/bruising.  Does not feel she is getting enough physical therapy.  Denies dizziness or lightheadedness.  No nausea or vomiting.        Review of nursing home EMR:SBP , Wt 232.0, -199      Allergies, and PMH/PSH reviewed in [employer] today.    REVIEW OF SYSTEMS:  4 point ROS including Respiratory, CV, GI and , other than that noted in the HPI,  is negative    Objective:   BP 99/69   Pulse 79   Temp 97.9  F (36.6  C)   Resp 18   Ht 1.727 m (5' 8\")   Wt 105.2 kg (232 lb)   LMP  (LMP Unknown)   SpO2 97%   BMI 35.28 kg/m      Physical Exam  Vitals (Facility EMR) reviewed.   Constitutional:       General: She is not in acute distress.  HENT:      Head: Normocephalic and atraumatic.   Eyes:      General: No scleral icterus.  Cardiovascular:      Rate and Rhythm: Normal rate and regular rhythm.      Heart sounds: " No murmur heard.  Pulmonary:      Effort: Pulmonary effort is normal.      Breath sounds: No wheezing.   Abdominal:      General: There is no distension.      Tenderness: There is no abdominal tenderness.   Skin:     General: Skin is warm and dry.      Comments: No new petechiae.  Healing bruising.   Neurological:      Mental Status: She is alert. Mental status is at baseline.   Psychiatric:         Behavior: Behavior normal.          MED REC REQUIRED  Post Medication Reconciliation Status: discharge medications reconciled, continue medications without change      Recent labs in [facility] reviewed by me today.  and Most Recent 3 CBC's:  Recent Labs   Lab Test 09/07/24  0646 09/06/24  0805 09/05/24  0659   WBC 8.1 6.7 5.1   HGB 14.6 13.6 13.8   MCV 91 91 89    188 152     Most Recent 3 BMP's:  Recent Labs   Lab Test 09/07/24  0652 09/07/24  0646 09/07/24  0203 09/03/24  0747 09/03/24  0652 09/01/24  0707 09/01/24  0615   NA  --  136  --   --  135  --  134*   POTASSIUM  --  3.9  --   --  3.7  --  4.0   CHLORIDE  --  96*  --   --  99  --  93*   CO2  --  29  --   --  25  --  27   BUN  --  44.9*  --   --  38.6*  --  23.4*   CR  --  1.17*  --   --  0.95  --  1.03*   ANIONGAP  --  11  --   --  11  --  14   SAUL  --  9.1  --   --  8.9  --  9.4   * 149* 126*   < > 230*   < > 245*    < > = values in this interval not displayed.       Assessment/Plan:  Severe thrombocytopenia  ITP  Chronic thrombocytopenia: Platelets 2 on admission.  Improved to 165 at discharge after IV IVIG and Decadron.  Baseline   - No current symptoms of bleeding.  Does have various bruises that are healing  - Continues on anticoagulation per hematology's recommendation  - Did not attend hematology appointment yesterday as planned due to transportation issue.  Will plan to obtain CBC with platelets tomorrow.  This was not initially ordered due to planned hematology follow-up    Steroid-induced encephalopathy: Hospital notes indicate some  confusion while receiving Decadron.  Seem to improve throughout hospital stay.  Currently very pleasant.  - Lives in [facility]  - OT and social work consulted. SLUMS at TCU 17/30. CPT pending    Klebsiella UTI, resolved: Completed course of ceftriaxone    Type 2 diabetes  Steroid-induced hyperglycemia: A1C 6.3.  Received Lantus during hospital stay but discontinued on discharge.  Continues on home glipizide  - Continue glipizide 2.5 mg twice daily  - Monitor blood glucoses twice daily. Overall appropriate    Atrial fibrillation s/p PPM:  -Not on rate controlling medications  - Warfarin transition to apixaban    HFpEF: EF 55-60%, 2+ TR:  - Continue Lasix  - Monitor volume status    GERD  -Historically on Protonix but transition to Pepcid due to thrombocytopenia    CKD:Estimated Creatinine Clearance: 47.8 mL/min (A) (based on SCr of 1.17 mg/dL (H)).  -Creatinine at baseline  - [facility] 9/13    Cirrhosis: Follows with [facility]  - Continue furosemide    Physical deconditioning:   -PT/OT/social work    Anxiety:   - Continue Effexor    Orders:  As above      Electronically signed by: Penny Chen PA-C       Sincerely,        Penny Chen PA-C

## 2024-09-13 ENCOUNTER — TELEPHONE (OUTPATIENT)
Dept: GERIATRICS | Facility: CLINIC | Age: 84
End: 2024-09-13
Payer: COMMERCIAL

## 2024-09-13 ENCOUNTER — PATIENT OUTREACH (OUTPATIENT)
Dept: ONCOLOGY | Facility: CLINIC | Age: 84
End: 2024-09-13
Payer: COMMERCIAL

## 2024-09-13 LAB
ANION GAP SERPL CALCULATED.3IONS-SCNC: 10 MMOL/L (ref 7–15)
BUN SERPL-MCNC: 19.9 MG/DL (ref 8–23)
CALCIUM SERPL-MCNC: 9.5 MG/DL (ref 8.8–10.4)
CHLORIDE SERPL-SCNC: 97 MMOL/L (ref 98–107)
CREAT SERPL-MCNC: 1.03 MG/DL (ref 0.51–0.95)
EGFRCR SERPLBLD CKD-EPI 2021: 54 ML/MIN/1.73M2
ERYTHROCYTE [DISTWIDTH] IN BLOOD BY AUTOMATED COUNT: 14.3 % (ref 10–15)
GLUCOSE SERPL-MCNC: 167 MG/DL (ref 70–99)
HCO3 SERPL-SCNC: 29 MMOL/L (ref 22–29)
HCT VFR BLD AUTO: 43.7 % (ref 35–47)
HGB BLD-MCNC: 14.4 G/DL (ref 11.7–15.7)
MCH RBC QN AUTO: 30.6 PG (ref 26.5–33)
MCHC RBC AUTO-ENTMCNC: 33 G/DL (ref 31.5–36.5)
MCV RBC AUTO: 93 FL (ref 78–100)
PLATELET # BLD AUTO: 70 10E3/UL (ref 150–450)
POTASSIUM SERPL-SCNC: 4.2 MMOL/L (ref 3.4–5.3)
RBC # BLD AUTO: 4.71 10E6/UL (ref 3.8–5.2)
SODIUM SERPL-SCNC: 136 MMOL/L (ref 135–145)
WBC # BLD AUTO: 6 10E3/UL (ref 4–11)

## 2024-09-13 PROCEDURE — 36415 COLL VENOUS BLD VENIPUNCTURE: CPT | Performed by: PHYSICIAN ASSISTANT

## 2024-09-13 PROCEDURE — P9604 ONE-WAY ALLOW PRORATED TRIP: HCPCS | Performed by: PHYSICIAN ASSISTANT

## 2024-09-13 PROCEDURE — 85027 COMPLETE CBC AUTOMATED: CPT | Performed by: PHYSICIAN ASSISTANT

## 2024-09-13 PROCEDURE — 80048 BASIC METABOLIC PNL TOTAL CA: CPT | Performed by: PHYSICIAN ASSISTANT

## 2024-09-13 NOTE — TELEPHONE ENCOUNTER
Writer received request from MELANI Paula at patient's TCU to schedule patient for virtual visit with Dr. Pompa next week for follow up (patient canceled appointment earlier this week).  aware and agreeable to this.     Patient now scheduled for appointment on 9.18.24 for virtual visit with Dr. Pompa. Writer called and updated Merly,  at the TCU, who indicated she will assist with facilitating this visit for patient.    Nafisa Fernandez RN, BSN, PHN, OCN     9/13/2024 at 3:16 PM  Nurse Care Coordinator  [facility]  P: [phone]   F: [phone]

## 2024-09-13 NOTE — TELEPHONE ENCOUNTER
[facility] Geriatrics Lab Note     Provider: Penny Chen PA-C  Facility: [facility]  Facility Type:  TCU    Allergies   Allergen Reactions    Augmented Betamethasone Diprop [Betamethasone] Other (See Comments)     Severe yeast infection    Petrolatum Anaphylaxis and Swelling     Rash and swelling    Shellfish-Derived Products Anaphylaxis     Tongue swelling    Aspirin Swelling     tongue swelling    Bacitracin      Rash swelling    Bactrim [Sulfamethoxazole-Trimethoprim] Dizziness    Darvon [Propoxyphene] Swelling     Throat closes    Dilaudid [Hydromorphone]      No side effects from fentanyl June 2023    Levaquin [Levofloxacin] Swelling     Tongue swelling    Lidocaine      Facial swelling     Morphine Unknown     [facility] 2/23/24    Neomycin Swelling     rash    Neosporin [Neomycin-Polymyxin-Gramicidin] Swelling     rash    Nitrofurantoin      SOB, GI upset,    Oxycodone      Severe itching    Percocet [Oxycodone-Acetaminophen] Unknown    Percodan [Oxycodone-Aspirin]      Severe itching    Polymyxin B     Pramoxine     Tramadol     Vicodin [Hydrocodone-Acetaminophen]      Severe itching      Xarelto [Rivaroxaban]     Adhesive Tape Rash     Band aids     Codeine Rash    Hydrocortisone Rash and Swelling    Other Environmental Allergy Rash     Adhesive tape   Band aids        Labs Reviewed by provider: Heme 2, BMP     Verbal Order/Direction given by Provider: PLT trending down after ITP. Missed Heme appt this week. Coordinated with Hematologist Dr. Pompa. Agrees with staying on Eliquis and repeat labs on Monday.     Please order repeat CBC for 9/16/24 dx thrombocytopenia     Provider giving Order:  Penny Chen PA-C    Verbal Order given to: Meli Pisano RN

## 2024-09-14 ENCOUNTER — LAB REQUISITION (OUTPATIENT)
Dept: LAB | Facility: CLINIC | Age: 84
End: 2024-09-14
Payer: COMMERCIAL

## 2024-09-14 DIAGNOSIS — D69.6 THROMBOCYTOPENIA, UNSPECIFIED (H): ICD-10-CM

## 2024-09-16 LAB
ERYTHROCYTE [DISTWIDTH] IN BLOOD BY AUTOMATED COUNT: 14.3 % (ref 10–15)
HCT VFR BLD AUTO: 39.5 % (ref 35–47)
HGB BLD-MCNC: 13 G/DL (ref 11.7–15.7)
MCH RBC QN AUTO: 31 PG (ref 26.5–33)
MCHC RBC AUTO-ENTMCNC: 32.9 G/DL (ref 31.5–36.5)
MCV RBC AUTO: 94 FL (ref 78–100)
PLATELET # BLD AUTO: 59 10E3/UL (ref 150–450)
RBC # BLD AUTO: 4.2 10E6/UL (ref 3.8–5.2)
WBC # BLD AUTO: 4.5 10E3/UL (ref 4–11)

## 2024-09-16 PROCEDURE — 85018 HEMOGLOBIN: CPT | Performed by: PHYSICIAN ASSISTANT

## 2024-09-16 PROCEDURE — P9604 ONE-WAY ALLOW PRORATED TRIP: HCPCS | Performed by: PHYSICIAN ASSISTANT

## 2024-09-16 PROCEDURE — 36415 COLL VENOUS BLD VENIPUNCTURE: CPT | Performed by: PHYSICIAN ASSISTANT

## 2024-09-17 ENCOUNTER — TELEPHONE (OUTPATIENT)
Dept: INTERNAL MEDICINE | Facility: CLINIC | Age: 84
End: 2024-09-17

## 2024-09-17 ENCOUNTER — LAB REQUISITION (OUTPATIENT)
Dept: LAB | Facility: CLINIC | Age: 84
End: 2024-09-17
Payer: COMMERCIAL

## 2024-09-17 ENCOUNTER — TRANSITIONAL CARE UNIT VISIT (OUTPATIENT)
Dept: GERIATRICS | Facility: CLINIC | Age: 84
End: 2024-09-17
Payer: COMMERCIAL

## 2024-09-17 VITALS
BODY MASS INDEX: 34.74 KG/M2 | WEIGHT: 229.2 LBS | TEMPERATURE: 97 F | DIASTOLIC BLOOD PRESSURE: 78 MMHG | RESPIRATION RATE: 18 BRPM | HEART RATE: 64 BPM | HEIGHT: 68 IN | OXYGEN SATURATION: 93 % | SYSTOLIC BLOOD PRESSURE: 136 MMHG

## 2024-09-17 DIAGNOSIS — D69.6 THROMBOCYTOPENIA (H): Primary | ICD-10-CM

## 2024-09-17 DIAGNOSIS — E11.22 TYPE 2 DIABETES MELLITUS WITH STAGE 3A CHRONIC KIDNEY DISEASE, WITHOUT LONG-TERM CURRENT USE OF INSULIN (H): ICD-10-CM

## 2024-09-17 DIAGNOSIS — D69.6 THROMBOCYTOPENIA, UNSPECIFIED (H): ICD-10-CM

## 2024-09-17 DIAGNOSIS — G93.40 ACUTE ENCEPHALOPATHY: ICD-10-CM

## 2024-09-17 DIAGNOSIS — N18.31 TYPE 2 DIABETES MELLITUS WITH STAGE 3A CHRONIC KIDNEY DISEASE, WITHOUT LONG-TERM CURRENT USE OF INSULIN (H): ICD-10-CM

## 2024-09-17 PROCEDURE — 99309 SBSQ NF CARE MODERATE MDM 30: CPT | Performed by: PHYSICIAN ASSISTANT

## 2024-09-17 NOTE — PROGRESS NOTES
"  Chief Complaint   Patient presents with    Nursing Home Acute       HPI:  Savanna Rehman is a 83 year old  (1940), who is being seen today for an episodic care visit at: [facility] ([facility]) [[id number]].     Brief summary: Savanna Rehman is a pleasant 83-year-old female with a past medical history of atrial fibrillation, HFpEF, migraines, fibromyalgia and cirrhosis who was recently hospitalized at [facility].  Presented with petechial rash and bruising.  Found to have severe thrombocytopenia with platelets of 2000.  Hematology was consulted and she was treated with 3 units platelets, IVIG and Decadron.  Platelets normalized prior to discharge.  Did require some IV diuresis due to volume resuscitation.  Additionally some confusion and hyperglycemia with Decadron that improved prior to discharge    Today's concern is: PLT downtrending but no s/s bleeding. Continues on apixaban.  Has virtual follow-up with oncology tomorrow.  Actually planning to discharge from [facility] on Friday.  Had some diarrhea last week but that has resolved.          Review of nursing home EMR:-136, -198      Allergies, and PMH/PSH reviewed in [employer] today.    REVIEW OF SYSTEMS:  4 point ROS including Respiratory, CV, GI and , other than that noted in the HPI,  is negative    Objective:   /78   Pulse 64   Temp 97  F (36.1  C)   Resp 18   Ht 1.727 m (5' 8\")   Wt 104 kg (229 lb 3.2 oz)   LMP  (LMP Unknown)   SpO2 93%   BMI 34.85 kg/m      Physical Exam  Vitals (Facility EMR) reviewed.   Constitutional:       General: She is not in acute distress.  HENT:      Head: Normocephalic and atraumatic.   Eyes:      General: No scleral icterus.  Cardiovascular:      Rate and Rhythm: Normal rate and regular rhythm.      Heart sounds: No murmur heard.  Pulmonary:      Effort: Pulmonary effort is normal.   Musculoskeletal:      Right lower leg: No edema.      Left lower leg: No edema.   Skin:     General: " Skin is warm and dry.   Neurological:      Mental Status: She is alert. Mental status is at baseline.   Psychiatric:         Behavior: Behavior normal.          MED REC REQUIRED  Post Medication Reconciliation Status: discharge medications reconciled and changed, per note/orders      Recent labs in [facility] reviewed by me today.  and Most Recent 3 CBC's:  Recent Labs   Lab Test 09/16/24  0601 09/13/24  0908 09/07/24  0646   WBC 4.5 6.0 8.1   HGB 13.0 14.4 14.6   MCV 94 93 91   PLT 59* 70* 165     Most Recent 3 BMP's:  Recent Labs   Lab Test 09/13/24  0908 09/07/24  0652 09/07/24  0646 09/03/24  0747 09/03/24  0652     --  136  --  135   POTASSIUM 4.2  --  3.9  --  3.7   CHLORIDE 97*  --  96*  --  99   CO2 29  --  29  --  25   BUN 19.9  --  44.9*  --  38.6*   CR 1.03*  --  1.17*  --  0.95   ANIONGAP 10  --  11  --  11   SAUL 9.5  --  9.1  --  8.9   * 147* 149*   < > 230*    < > = values in this interval not displayed.     Most Recent 2 LFT's:  Recent Labs   Lab Test 08/31/24  0714 08/30/24  1718   AST 46* 40   ALT 30 34   ALKPHOS 113 135   BILITOTAL 1.9* 1.2       Assessment/Plan:  Severe thrombocytopenia  ITP  Chronic thrombocytopenia: Platelets 2 on admission.  Improved to 165 at discharge after IV IVIG and Decadron.  Baseline   - PLT trending down at TCU, 59 yesterday. No current symptoms of bleeding.  Does have various bruises that are healing  - Continues on anticoagulation per hematology's recommendation  - Virtual follow-up with hematology tomorrow  - Ordered repeat labs for 9/19 given plan discharge from TCU 9/20    Steroid-induced encephalopathy: Hospital notes indicate some confusion while receiving Decadron.  Seem to improve throughout hospital stay.  Currently very pleasant.  - Lives in [facility]  - OT and social work consulted. SLUMS at TCU 17/30. CPT 4.8/5.6    Klebsiella UTI, resolved: Completed course of ceftriaxone    Type 2 diabetes  Steroid-induced hyperglycemia: A1C 6.3.  Received Lantus  during hospital stay but discontinued on discharge.  Continues on home glipizide  - Continue glipizide 2.5 mg twice daily  - Monitor blood glucoses twice daily. Overall appropriate    Atrial fibrillation s/p PPM:  -Not on rate controlling medications  - Warfarin transition to apixaban    HFpEF: EF 55-60%, 2+ TR:  - Continue Lasix  - Monitor volume status    GERD  -Historically on Protonix but transition to Pepcid due to thrombocytopenia    CKD III:Estimated Creatinine Clearance: 54 mL/min (A) (based on SCr of 1.03 mg/dL (H)).  -Creatinine at baseline      Cirrhosis: Follows with [facility]  - Continue furosemide    Physical deconditioning:   -PT/OT/social work    Anxiety:   - Continue Effexor    Orders:  As above      Electronically signed by: Penny Chen PA-C

## 2024-09-17 NOTE — LETTER
" 9/17/2024      Savanna Rehman  [address]  [facility] [address]          Chief Complaint   Patient presents with     Nursing Home Acute       HPI:  Savanna Rehman is a 83 year old  (1940), who is being seen today for an episodic care visit at: [facility] ([facility]) [[id number]].     Brief summary: Savanna Rehman is a pleasant 83-year-old female with a past medical history of atrial fibrillation, HFpEF, migraines, fibromyalgia and cirrhosis who was recently hospitalized at [facility].  Presented with petechial rash and bruising.  Found to have severe thrombocytopenia with platelets of 2000.  Hematology was consulted and she was treated with 3 units platelets, IVIG and Decadron.  Platelets normalized prior to discharge.  Did require some IV diuresis due to volume resuscitation.  Additionally some confusion and hyperglycemia with Decadron that improved prior to discharge    Today's concern is: PLT downtrending but no s/s bleeding. Continues on apixaban.  Has virtual follow-up with oncology tomorrow.  Actually planning to discharge from [facility] on Friday.  Had some diarrhea last week but that has resolved.          Review of nursing home EMR:-136, -198      Allergies, and PMH/PSH reviewed in [employer] today.    REVIEW OF SYSTEMS:  4 point ROS including Respiratory, CV, GI and , other than that noted in the HPI,  is negative    Objective:   /78   Pulse 64   Temp 97  F (36.1  C)   Resp 18   Ht 1.727 m (5' 8\")   Wt 104 kg (229 lb 3.2 oz)   LMP  (LMP Unknown)   SpO2 93%   BMI 34.85 kg/m      Physical Exam  Vitals (Facility EMR) reviewed.   Constitutional:       General: She is not in acute distress.  HENT:      Head: Normocephalic and atraumatic.   Eyes:      General: No scleral icterus.  Cardiovascular:      Rate and Rhythm: Normal rate and regular rhythm.      Heart sounds: No murmur heard.  Pulmonary:      Effort: Pulmonary effort is normal. "   Musculoskeletal:      Right lower leg: No edema.      Left lower leg: No edema.   Skin:     General: Skin is warm and dry.   Neurological:      Mental Status: She is alert. Mental status is at baseline.   Psychiatric:         Behavior: Behavior normal.          MED REC REQUIRED  Post Medication Reconciliation Status: discharge medications reconciled and changed, per note/orders      Recent labs in [facility] reviewed by me today.  and Most Recent 3 CBC's:  Recent Labs   Lab Test 09/16/24  0601 09/13/24  0908 09/07/24  0646   WBC 4.5 6.0 8.1   HGB 13.0 14.4 14.6   MCV 94 93 91   PLT 59* 70* 165     Most Recent 3 BMP's:  Recent Labs   Lab Test 09/13/24  0908 09/07/24  0652 09/07/24  0646 09/03/24  0747 09/03/24  0652     --  136  --  135   POTASSIUM 4.2  --  3.9  --  3.7   CHLORIDE 97*  --  96*  --  99   CO2 29  --  29  --  25   BUN 19.9  --  44.9*  --  38.6*   CR 1.03*  --  1.17*  --  0.95   ANIONGAP 10  --  11  --  11   SAUL 9.5  --  9.1  --  8.9   * 147* 149*   < > 230*    < > = values in this interval not displayed.     Most Recent 2 LFT's:  Recent Labs   Lab Test 08/31/24  0714 08/30/24  1718   AST 46* 40   ALT 30 34   ALKPHOS 113 135   BILITOTAL 1.9* 1.2       Assessment/Plan:  Severe thrombocytopenia  ITP  Chronic thrombocytopenia: Platelets 2 on admission.  Improved to 165 at discharge after IV IVIG and Decadron.  Baseline   - PLT trending down at TCU, 59 yesterday. No current symptoms of bleeding.  Does have various bruises that are healing  - Continues on anticoagulation per hematology's recommendation  - Virtual follow-up with hematology tomorrow  - Ordered repeat labs for 9/19 given plan discharge from TCU 9/20    Steroid-induced encephalopathy: Hospital notes indicate some confusion while receiving Decadron.  Seem to improve throughout hospital stay.  Currently very pleasant.  - Lives in \[facility]  - OT and social work consulted. SLUMS at [facility] 17/30. CPT 4.8/5.6    Klebsiella UTI, resolved:  Completed course of ceftriaxone    Type 2 diabetes  Steroid-induced hyperglycemia: A1C 6.3.  Received Lantus during hospital stay but discontinued on discharge.  Continues on home glipizide  - Continue glipizide 2.5 mg twice daily  - Monitor blood glucoses twice daily. Overall appropriate    Atrial fibrillation s/p PPM:  -Not on rate controlling medications  - Warfarin transition to apixaban    HFpEF: EF 55-60%, 2+ TR:  - Continue Lasix  - Monitor volume status    GERD  -Historically on Protonix but transition to Pepcid due to thrombocytopenia    CKD III:Estimated Creatinine Clearance: 54 mL/min (A) (based on SCr of 1.03 mg/dL (H)).  -Creatinine at baseline      Cirrhosis: Follows with [facility]  - Continue furosemide    Physical deconditioning:   -PT/OT/social work    Anxiety:   - Continue Effexor    Orders:  As above      Electronically signed by: Penny Chen PA-C       Sincerely,        Penny Chen PA-C

## 2024-09-18 ENCOUNTER — VIRTUAL VISIT (OUTPATIENT)
Dept: ONCOLOGY | Facility: CLINIC | Age: 84
End: 2024-09-18
Attending: INTERNAL MEDICINE
Payer: COMMERCIAL

## 2024-09-18 VITALS — HEIGHT: 68 IN | BODY MASS INDEX: 34.71 KG/M2 | WEIGHT: 229 LBS

## 2024-09-18 DIAGNOSIS — D69.3 IDIOPATHIC THROMBOCYTOPENIC PURPURA (H): Primary | ICD-10-CM

## 2024-09-18 PROCEDURE — G2211 COMPLEX E/M VISIT ADD ON: HCPCS | Mod: 95 | Performed by: INTERNAL MEDICINE

## 2024-09-18 PROCEDURE — 99215 OFFICE O/P EST HI 40 MIN: CPT | Mod: 95 | Performed by: INTERNAL MEDICINE

## 2024-09-18 NOTE — PROGRESS NOTES
[facility]    Hematology/Oncology Consult Note      Date of Consult:  09/18/24   Reason for Consult: Thrombocytopenia-ITP follow up  Oncology/Hematology Physician: MD Aletha    Impression/Plan:   Weekly CBC for ITP not in remission  - Repeat Cbc on 9/19/2024  - If PLT <20-30K plan to start Dex 40 mg PO x4 and keep the patient at [facility] Type:  TCU   - If PLT 30-50K plan to start Promacta 25 mg po daily  - Patient to follow up with MD and labs on 9/30/2024 with CBC.  - For notable bleeding or petechia- patient to present to the ED    Anticoagulation, decrease dose of Eliquis to 2.5 mg po bid given her age, low plts and borderline low EGFR. Hold Eliquis for PLT,20K.     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 PLC 152K 9/5/2024--> 70K on 9/13/2024 and 59K on 9/16/2024. CT imaging without malignancy however cirrhosis and splenomegaly plus diverticulosis  Atrial fibrillation on chronic anticoagulation.  Cirrhosis.  Congestive heart failure.  Coronary artery disease.  Diabetes mellitus type 2.  Fibromyalgia.  GERD.  Gout.  Sleep apnea.       History of present illness: Savanna Rehman is a 83 year old patient admitted to [facility] for PLT count of 2K presented with petechia. NO precipitating factors. CT imaging without malignancy however cirrhosis and splenomegaly plus diverticulosis noted. Baseline plt ~100K with underlying cirrhosis. Given abrupt drop presume cause of acute thrombocytopenia is ITP. Reports recent initiation of Protonix. S/p IVIG 8/31/24 and 9/1/24 S/p Dex 40mg x 4 days (8/31-9/3/24). PLT improved. Switched to Eliquis from coumadin.     Interim history  She was recently discharged to the TCU and her plts have been slowly dropping to 59K on 9/16. She denied any bleeding and would like to go home on Friday.     Review of Systems: See interval hx. Denies fevers,  chills, HA, dizziness, n/t, changes in vision, cough, sore throat, CP, SOB, abdominal pain, N/V, diarrhea, changes in urination, bleeding, bruising, rash.     PMHx and Social Hx reviewed per EPIC.    Medications:  Current Outpatient Medications   Medication Sig Dispense Refill    acetaminophen (TYLENOL) 500 MG tablet Take 1,000 mg by mouth every 6 hours as needed for mild pain      apixaban ANTICOAGULANT (ELIQUIS) 5 MG tablet Take 1 tablet (5 mg) by mouth 2 times daily.      butalbital-aspirin-caffeine (FIORINAL) -40 MG capsule Take 1 capsule by mouth every 4 hours as needed for headaches or migraine. 10 capsule 0    cholecalciferol (VITAMIN D3) 10 mcg (400 units) TABS tablet Take 10 mcg by mouth daily.      EPINEPHrine (ANY BX GENERIC EQUIV) 0.3 MG/0.3ML injection 2-pack Inject 0.3 mLs (0.3 mg) into the muscle as needed for anaphylaxis May repeat one time in 5-15 minutes if response to initial dose is inadequate. 2 each 3    escitalopram (LEXAPRO) 10 MG tablet Take 1 tablet (10 mg) by mouth at bedtime 90 tablet 1    famotidine (PEPCID) 10 MG tablet Take 1 tablet (10 mg) by mouth 2 times daily as needed (Heart burn).      furosemide (LASIX) 40 MG tablet Take 1 tablet (40 mg) by mouth daily for 90 days 90 tablet 0    glipiZIDE (GLUCOTROL XL) 2.5 MG 24 hr tablet Take 1 tablet by mouth twice daily 180 tablet 3    loperamide (IMODIUM) 2 MG capsule TAKE 1 CAPSULE BY MOUTH 4 TIMES DAILY AS NEEDED FOR DIARRHEA 60 capsule 0    melatonin 3 MG tablet Take 3 mg by mouth nightly as needed for sleep.      ondansetron (ZOFRAN ODT) 4 MG ODT tab Take 4 mg by mouth every 6 hours as needed for nausea.         Allergies   Allergen Reactions    Augmented Betamethasone Diprop [Betamethasone] Other (See Comments)     Severe yeast infection    Petrolatum Anaphylaxis and Swelling     Rash and swelling    Shellfish-Derived Products Anaphylaxis     Tongue swelling    Aspirin Swelling     tongue swelling    Bacitracin      Rash swelling     Bactrim [Sulfamethoxazole-Trimethoprim] Dizziness    Darvon [Propoxyphene] Swelling     Throat closes    Dilaudid [Hydromorphone]      No side effects from fentanyl June 2023    Levaquin [Levofloxacin] Swelling     Tongue swelling    Lidocaine      Facial swelling     Morphine Unknown     Per [facility] 2/23/24    Neomycin Swelling     rash    Neosporin [Neomycin-Polymyxin-Gramicidin] Swelling     rash    Nitrofurantoin      SOB, GI upset,    Oxycodone      Severe itching    Percocet [Oxycodone-Acetaminophen] Unknown    Percodan [Oxycodone-Aspirin]      Severe itching    Polymyxin B     Pramoxine     Tramadol     Vicodin [Hydrocodone-Acetaminophen]      Severe itching      Xarelto [Rivaroxaban]     Adhesive Tape Rash     Band aids     Codeine Rash    Hydrocortisone Rash and Swelling    Other Environmental Allergy Rash     Adhesive tape   Band aids          EXAM:    LMP  (LMP Unknown)     GENERAL:  female, in no acute distress.  Alert and oriented x3.   HEENT:  Normocephalic, atraumatic.  PERRL, oropharynx clear with no sores or thrush.   LYMPH NODES:  No visible cervical, axillary lymphadenopathy appreciated.  LUNGS:  no wheezing  ABDOMEN:  Soft, nontender and nondistended.  Bowel sounds heard x4.  No apparent hepatosplenomegaly.   PSYCH: Calm and cooperative       Labs: CBC RESULTS:   Recent Labs   Lab Test 09/16/24  0601   WBC 4.5   RBC 4.20   HGB 13.0   HCT 39.5   MCV 94   MCH 31.0   MCHC 32.9   RDW 14.3   PLT 59*        40 minutes spent on the date of the encounter doing chart review, review of test results, interpretation of tests, patient visit, and documentation     The longitudinal plan of care for the diagnosis(es)/condition(s) as documented were addressed during this visit. Due to the added complexity in care, I will continue to support Savanna in the subsequent management and with ongoing continuity of care.    Patsy Pompa  Hematology/Oncology  [facility]

## 2024-09-18 NOTE — LETTER
9/18/2024      Savanna Rehman  [address]  [facility] [address]      Dear Colleague,    Thank you for referring your patient, Savanna Rehman, to the [facility]CER [facility]. Please see a copy of my visit note below.    [facility]    Hematology/Oncology Consult Note      Date of Consult:  09/18/24   Reason for Consult: Thrombocytopenia-ITP follow up  Oncology/Hematology Physician: MD Aletha    Impression/Plan:   Weekly CBC for ITP not in remission  - Repeat Cbc on 9/19/2024  - If PLT <20-30K plan to start Dex 40 mg PO x4 and keep the patient at [facility] Type:  TCU   - If PLT 30-50K plan to start Promacta 25 mg po daily  - Patient to follow up with MD and labs on 9/30/2024 with CBC.  - For notable bleeding or petechia- patient to present to the ED    Anticoagulation, decrease dose of Eliquis to 2.5 mg po bid given her age, low plts and borderline low EGFR. Hold Eliquis for PLT,20K.     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 PLC 152K 9/5/2024--> 70K on 9/13/2024 and 59K on 9/16/2024. CT imaging without malignancy however cirrhosis and splenomegaly plus diverticulosis  Atrial fibrillation on chronic anticoagulation.  Cirrhosis.  Congestive heart failure.  Coronary artery disease.  Diabetes mellitus type 2.  Fibromyalgia.  GERD.  Gout.  Sleep apnea.       History of present illness: Savanna Rehman is a 83 year old patient admitted to [facility] for PLT count of 2K presented with petechia. NO precipitating factors. CT imaging without malignancy however cirrhosis and splenomegaly plus diverticulosis noted. Baseline plt ~100K with underlying cirrhosis. Given abrupt drop presume cause of acute thrombocytopenia is ITP. Reports recent initiation of Protonix. S/p IVIG 8/31/24 and 9/1/24 S/p Dex 40mg x 4 days (8/31-9/3/24). PLT improved.  Switched to Eliquis from coumadin.     Interim history  She was recently discharged to the TCU and her plts have been slowly dropping to 59K on 9/16. She denied any bleeding and would like to go home on Friday.     Review of Systems: See interval hx. Denies fevers, chills, HA, dizziness, n/t, changes in vision, cough, sore throat, CP, SOB, abdominal pain, N/V, diarrhea, changes in urination, bleeding, bruising, rash.     PMHx and Social Hx reviewed per EPIC.    Medications:  Current Outpatient Medications   Medication Sig Dispense Refill     acetaminophen (TYLENOL) 500 MG tablet Take 1,000 mg by mouth every 6 hours as needed for mild pain       apixaban ANTICOAGULANT (ELIQUIS) 5 MG tablet Take 1 tablet (5 mg) by mouth 2 times daily.       butalbital-aspirin-caffeine (FIORINAL) -40 MG capsule Take 1 capsule by mouth every 4 hours as needed for headaches or migraine. 10 capsule 0     cholecalciferol (VITAMIN D3) 10 mcg (400 units) TABS tablet Take 10 mcg by mouth daily.       EPINEPHrine (ANY BX GENERIC EQUIV) 0.3 MG/0.3ML injection 2-pack Inject 0.3 mLs (0.3 mg) into the muscle as needed for anaphylaxis May repeat one time in 5-15 minutes if response to initial dose is inadequate. 2 each 3     escitalopram (LEXAPRO) 10 MG tablet Take 1 tablet (10 mg) by mouth at bedtime 90 tablet 1     famotidine (PEPCID) 10 MG tablet Take 1 tablet (10 mg) by mouth 2 times daily as needed (Heart burn).       furosemide (LASIX) 40 MG tablet Take 1 tablet (40 mg) by mouth daily for 90 days 90 tablet 0     glipiZIDE (GLUCOTROL XL) 2.5 MG 24 hr tablet Take 1 tablet by mouth twice daily 180 tablet 3     loperamide (IMODIUM) 2 MG capsule TAKE 1 CAPSULE BY MOUTH 4 TIMES DAILY AS NEEDED FOR DIARRHEA 60 capsule 0     melatonin 3 MG tablet Take 3 mg by mouth nightly as needed for sleep.       ondansetron (ZOFRAN ODT) 4 MG ODT tab Take 4 mg by mouth every 6 hours as needed for nausea.         Allergies   Allergen Reactions     Augmented  Betamethasone Diprop [Betamethasone] Other (See Comments)     Severe yeast infection     Petrolatum Anaphylaxis and Swelling     Rash and swelling     Shellfish-Derived Products Anaphylaxis     Tongue swelling     Aspirin Swelling     tongue swelling     Bacitracin      Rash swelling     Bactrim [Sulfamethoxazole-Trimethoprim] Dizziness     Darvon [Propoxyphene] Swelling     Throat closes     Dilaudid [Hydromorphone]      No side effects from fentanyl June 2023     Levaquin [Levofloxacin] Swelling     Tongue swelling     Lidocaine      Facial swelling      Morphine Unknown     Per [facility] 2/23/24     Neomycin Swelling     rash     Neosporin [Neomycin-Polymyxin-Gramicidin] Swelling     rash     Nitrofurantoin      SOB, GI upset,     Oxycodone      Severe itching     Percocet [Oxycodone-Acetaminophen] Unknown     Percodan [Oxycodone-Aspirin]      Severe itching     Polymyxin B      Pramoxine      Tramadol      Vicodin [Hydrocodone-Acetaminophen]      Severe itching       Xarelto [Rivaroxaban]      Adhesive Tape Rash     Band aids      Codeine Rash     Hydrocortisone Rash and Swelling     Other Environmental Allergy Rash     Adhesive tape   Band aids          EXAM:    LMP  (LMP Unknown)     GENERAL:  female, in no acute distress.  Alert and oriented x3.   HEENT:  Normocephalic, atraumatic.  PERRL, oropharynx clear with no sores or thrush.   LYMPH NODES:  No visible cervical, axillary lymphadenopathy appreciated.  LUNGS:  no wheezing  ABDOMEN:  Soft, nontender and nondistended.  Bowel sounds heard x4.  No apparent hepatosplenomegaly.   PSYCH: Calm and cooperative       Labs: CBC RESULTS:   Recent Labs   Lab Test 09/16/24  0601   WBC 4.5   RBC 4.20   HGB 13.0   HCT 39.5   MCV 94   MCH 31.0   MCHC 32.9   RDW 14.3   PLT 59*        40 minutes spent on the date of the encounter doing chart review, review of test results, interpretation of tests, patient visit, and documentation     The longitudinal plan of care  for the diagnosis(es)/condition(s) as documented were addressed during this visit. Due to the added complexity in care, I will continue to support Savanna in the subsequent management and with ongoing continuity of care.    Patsy Pompa  Hematology/Oncology  [facility]               Virtual Visit Details    Type of service:  Video Visit   Video Start Time: 12:28  Video End Time: 13:00    Originating Location (pt. Location): [facility]    Distant Location (provider location):  On-site  Platform used for Video Visit: AmWell      Again, thank you for allowing me to participate in the care of your patient.        Sincerely,        Patsy Pompa MD

## 2024-09-18 NOTE — LETTER
9/18/2024      Savanna Rehman  [address]  [facility] [address]      Dear Colleague,    Thank you for referring your patient, Savanna Rehman, to the [facility] CANCER [facility]. Please see a copy of my visit note below.    [facility]    Hematology/Oncology Consult Note      Date of Consult:  09/18/24   Reason for Consult: Thrombocytopenia-ITP follow up  Oncology/Hematology Physician: MD Aletha    Impression/Plan:   Weekly CBC for ITP not in remission  - Repeat Cbc on 9/19/2024  - If PLT <20-30K plan to start Dex 40 mg PO x4 and keep the patient at [facility] Type:  TCU   - If PLT 30-50K plan to start Promacta 25 mg po daily  - Patient to follow up with MD and labs on 9/30/2024 with CBC.  - For notable bleeding or petechia- patient to present to the ED    Anticoagulation, decrease dose of Eliquis to 2.5 mg po bid given her age, low plts and borderline low EGFR. Hold Eliquis for PLT,20K.     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 PLC 152K 9/5/2024--> 70K on 9/13/2024 and 59K on 9/16/2024. CT imaging without malignancy however cirrhosis and splenomegaly plus diverticulosis  Atrial fibrillation on chronic anticoagulation.  Cirrhosis.  Congestive heart failure.  Coronary artery disease.  Diabetes mellitus type 2.  Fibromyalgia.  GERD.  Gout.  Sleep apnea.       History of present illness: Savanna Rehman is a 83 year old patient admitted to [facility] for PLT count of 2K presented with petechia. NO precipitating factors. CT imaging without malignancy however cirrhosis and splenomegaly plus diverticulosis noted. Baseline plt ~100K with underlying cirrhosis. Given abrupt drop presume cause of acute thrombocytopenia is ITP. Reports recent initiation of Protonix. S/p IVIG 8/31/24 and 9/1/24 S/p Dex 40mg x 4 days (8/31-9/3/24). PLT improved.  Switched to Eliquis from coumadin.     Interim history  She was recently discharged to the TCU and her plts have been slowly dropping to 59K on 9/16. She denied any bleeding and would like to go home on Friday.     Review of Systems: See interval hx. Denies fevers, chills, HA, dizziness, n/t, changes in vision, cough, sore throat, CP, SOB, abdominal pain, N/V, diarrhea, changes in urination, bleeding, bruising, rash.     PMHx and Social Hx reviewed per EPIC.    Medications:  Current Outpatient Medications   Medication Sig Dispense Refill     acetaminophen (TYLENOL) 500 MG tablet Take 1,000 mg by mouth every 6 hours as needed for mild pain       apixaban ANTICOAGULANT (ELIQUIS) 5 MG tablet Take 1 tablet (5 mg) by mouth 2 times daily.       butalbital-aspirin-caffeine (FIORINAL) -40 MG capsule Take 1 capsule by mouth every 4 hours as needed for headaches or migraine. 10 capsule 0     cholecalciferol (VITAMIN D3) 10 mcg (400 units) TABS tablet Take 10 mcg by mouth daily.       EPINEPHrine (ANY BX GENERIC EQUIV) 0.3 MG/0.3ML injection 2-pack Inject 0.3 mLs (0.3 mg) into the muscle as needed for anaphylaxis May repeat one time in 5-15 minutes if response to initial dose is inadequate. 2 each 3     escitalopram (LEXAPRO) 10 MG tablet Take 1 tablet (10 mg) by mouth at bedtime 90 tablet 1     famotidine (PEPCID) 10 MG tablet Take 1 tablet (10 mg) by mouth 2 times daily as needed (Heart burn).       furosemide (LASIX) 40 MG tablet Take 1 tablet (40 mg) by mouth daily for 90 days 90 tablet 0     glipiZIDE (GLUCOTROL XL) 2.5 MG 24 hr tablet Take 1 tablet by mouth twice daily 180 tablet 3     loperamide (IMODIUM) 2 MG capsule TAKE 1 CAPSULE BY MOUTH 4 TIMES DAILY AS NEEDED FOR DIARRHEA 60 capsule 0     melatonin 3 MG tablet Take 3 mg by mouth nightly as needed for sleep.       ondansetron (ZOFRAN ODT) 4 MG ODT tab Take 4 mg by mouth every 6 hours as needed for nausea.         Allergies   Allergen Reactions     Augmented  Betamethasone Diprop [Betamethasone] Other (See Comments)     Severe yeast infection     Petrolatum Anaphylaxis and Swelling     Rash and swelling     Shellfish-Derived Products Anaphylaxis     Tongue swelling     Aspirin Swelling     tongue swelling     Bacitracin      Rash swelling     Bactrim [Sulfamethoxazole-Trimethoprim] Dizziness     Darvon [Propoxyphene] Swelling     Throat closes     Dilaudid [Hydromorphone]      No side effects from fentanyl June 2023     Levaquin [Levofloxacin] Swelling     Tongue swelling     Lidocaine      Facial swelling      Morphine Unknown     Per [facility] 2/23/24     Neomycin Swelling     rash     Neosporin [Neomycin-Polymyxin-Gramicidin] Swelling     rash     Nitrofurantoin      SOB, GI upset,     Oxycodone      Severe itching     Percocet [Oxycodone-Acetaminophen] Unknown     Percodan [Oxycodone-Aspirin]      Severe itching     Polymyxin B      Pramoxine      Tramadol      Vicodin [Hydrocodone-Acetaminophen]      Severe itching       Xarelto [Rivaroxaban]      Adhesive Tape Rash     Band aids      Codeine Rash     Hydrocortisone Rash and Swelling     Other Environmental Allergy Rash     Adhesive tape   Band aids          EXAM:    LMP  (LMP Unknown)     GENERAL:  female, in no acute distress.  Alert and oriented x3.   HEENT:  Normocephalic, atraumatic.  PERRL, oropharynx clear with no sores or thrush.   LYMPH NODES:  No visible cervical, axillary lymphadenopathy appreciated.  LUNGS:  no wheezing  ABDOMEN:  Soft, nontender and nondistended.  Bowel sounds heard x4.  No apparent hepatosplenomegaly.   PSYCH: Calm and cooperative       Labs: CBC RESULTS:   Recent Labs   Lab Test 09/16/24  0601   WBC 4.5   RBC 4.20   HGB 13.0   HCT 39.5   MCV 94   MCH 31.0   MCHC 32.9   RDW 14.3   PLT 59*        40 minutes spent on the date of the encounter doing chart review, review of test results, interpretation of tests, patient visit, and documentation     The longitudinal plan of care  "for the diagnosis(es)/condition(s) as documented were addressed during this visit. Due to the added complexity in care, I will continue to support Savanna in the subsequent management and with ongoing continuity of care.    Patsy Pompa  Hematology/Oncology  [facility]               Virtual Visit Details    Type of service:  Video Visit   Video Start Time: {video visit start/end time for provider to select:865131}  Video End Time:{video visit start/end time for provider to select:674825}    Originating Location (pt. Location): {video visit patient location:239155::\"Home\"}  {PROVIDER LOCATION On-site should be selected for visits conducted from your clinic location or adjoining [facility] hospital, academic office, or other nearby [facility] building. Off-site should be selected for all other provider locations, including home:804174}  Distant Location (provider location):  {virtual location provider:845065}  Platform used for Video Visit: {Virtual Visit Platforms:747298::\"[employer]\"}      Again, thank you for allowing me to participate in the care of your patient.        Sincerely,        Patsy Pompa MD  "

## 2024-09-18 NOTE — NURSING NOTE
Current patient location: [address]   [place] [address]    Is the patient currently in the state of [place]? YES    Visit mode:VIDEO    If the visit is dropped, the patient can be reconnected by: TELEPHONE VISIT: Phone number: [phone]    Will anyone else be joining the visit? NO  (If patient encounters technical issues they should call [phone] :[phone])    How would you like to obtain your AVS? MyChart    Are changes needed to the allergy or medication list? No    Are refills needed on medications prescribed by this physician? NO    Rooming Documentation:  Questionnaire(s) completed      Reason for visit: RECHECK    Jane GAYLE

## 2024-09-18 NOTE — PROGRESS NOTES
Virtual Visit Details    Type of service:  Video Visit   Video Start Time: 12:28  Video End Time: 13:00    Originating Location (pt. Location): [facility]    Distant Location (provider location):  On-site  Platform used for Video Visit: Donte

## 2024-09-19 ENCOUNTER — DISCHARGE SUMMARY NURSING HOME (OUTPATIENT)
Dept: GERIATRICS | Facility: CLINIC | Age: 84
End: 2024-09-19
Payer: COMMERCIAL

## 2024-09-19 VITALS
HEIGHT: 68 IN | RESPIRATION RATE: 17 BRPM | OXYGEN SATURATION: 92 % | TEMPERATURE: 97.5 F | HEART RATE: 75 BPM | DIASTOLIC BLOOD PRESSURE: 72 MMHG | WEIGHT: 229.2 LBS | SYSTOLIC BLOOD PRESSURE: 129 MMHG | BODY MASS INDEX: 34.74 KG/M2

## 2024-09-19 DIAGNOSIS — Z79.01 LONG TERM CURRENT USE OF ANTICOAGULANT THERAPY: ICD-10-CM

## 2024-09-19 DIAGNOSIS — K21.00 GASTROESOPHAGEAL REFLUX DISEASE WITH ESOPHAGITIS, UNSPECIFIED WHETHER HEMORRHAGE: ICD-10-CM

## 2024-09-19 DIAGNOSIS — N18.31 TYPE 2 DIABETES MELLITUS WITH STAGE 3A CHRONIC KIDNEY DISEASE, WITHOUT LONG-TERM CURRENT USE OF INSULIN (H): ICD-10-CM

## 2024-09-19 DIAGNOSIS — G47.33 OSA (OBSTRUCTIVE SLEEP APNEA): ICD-10-CM

## 2024-09-19 DIAGNOSIS — I48.20 CHRONIC ATRIAL FIBRILLATION (H): ICD-10-CM

## 2024-09-19 DIAGNOSIS — D69.6 THROMBOCYTOPENIA (H): Primary | ICD-10-CM

## 2024-09-19 DIAGNOSIS — E11.22 TYPE 2 DIABETES MELLITUS WITH STAGE 3A CHRONIC KIDNEY DISEASE, WITHOUT LONG-TERM CURRENT USE OF INSULIN (H): ICD-10-CM

## 2024-09-19 DIAGNOSIS — R53.81 PHYSICAL DECONDITIONING: ICD-10-CM

## 2024-09-19 LAB
ERYTHROCYTE [DISTWIDTH] IN BLOOD BY AUTOMATED COUNT: 14.3 % (ref 10–15)
HCT VFR BLD AUTO: 38.4 % (ref 35–47)
HGB BLD-MCNC: 12.8 G/DL (ref 11.7–15.7)
MCH RBC QN AUTO: 31.4 PG (ref 26.5–33)
MCHC RBC AUTO-ENTMCNC: 33.3 G/DL (ref 31.5–36.5)
MCV RBC AUTO: 94 FL (ref 78–100)
PLATELET # BLD AUTO: 57 10E3/UL (ref 150–450)
RBC # BLD AUTO: 4.07 10E6/UL (ref 3.8–5.2)
WBC # BLD AUTO: 4 10E3/UL (ref 4–11)

## 2024-09-19 PROCEDURE — P9603 ONE-WAY ALLOW PRORATED MILES: HCPCS | Performed by: PHYSICIAN ASSISTANT

## 2024-09-19 PROCEDURE — 99316 NF DSCHRG MGMT 30 MIN+: CPT | Performed by: PHYSICIAN ASSISTANT

## 2024-09-19 PROCEDURE — 85014 HEMATOCRIT: CPT | Performed by: PHYSICIAN ASSISTANT

## 2024-09-19 PROCEDURE — 36415 COLL VENOUS BLD VENIPUNCTURE: CPT | Performed by: PHYSICIAN ASSISTANT

## 2024-09-19 RX ORDER — FAMOTIDINE 10 MG
10 TABLET ORAL 2 TIMES DAILY PRN
Qty: 60 TABLET | Refills: 0 | Status: SHIPPED | OUTPATIENT
Start: 2024-09-19

## 2024-09-19 NOTE — ADDENDUM NOTE
Addended by: KIM GARAY on: 9/19/2024 01:40 PM     Modules accepted: Orders     Goal Outcome Evaluation:  Plan of Care Reviewed With: patient           Outcome Evaluation: Pt is a 77 y/o F admitted to  [facility] after falling at home resulting in a L superior & inferior pubic rami fracture. Per ortho pt plans non-operative management and is WBAT. Pt received in bed upon arrival and agreeable to PT eval. Pt reports she lives alone with a elevator to enter and is (I) with mobility at BL. Pt presents to PT with expected pain, generalized weakness, and impaired functional mobility. Pt required mod A and increased time to complete with cues provided for sequencing to reach sitting EOB. Pt stood requiring mod A and took a few steps to chair c RW requiring CGA. Pt demo's a very slow shuffling gait with difficulty clearing B LE secondary to pain. Pt UIC and encouraged to complete HEP and complete transfers with staff for toileting needs. PT recommends SNF at D/C to address stated deficits to assist in returning to PLOF before home.      Anticipated Discharge Disposition (PT): skilled nursing facility

## 2024-09-19 NOTE — LETTER
9/19/2024      Savanna Rehman  [address]  [facility] [address]        [facility] GERIATRICS DISCHARGE SUMMARY  PATIENT'S NAME: Savanna Rehman  YOB: 1940  MEDICAL RECORD NUMBER:  [id number]  Place of Service where encounter took place:  [facility] (TCU) [[id number]]    PRIMARY CARE PROVIDER AND CLINIC RESPONSIBLE AFTER TRANSFER:   Taylor Payan MD, [facility] [address] / [facility] [address]    [facility] Provider     Transferring providers: Penny Chen PA-C, Dr. Silvia Alva MD  Recent Hospitalization/ED:  [facility] Hospital stay 8/30/24 to 9/7/24.  Date of SNF Admission: September 07, 2024  Date of SNF (anticipated) Discharge: September 20, 2024  Discharged to: previous independent home  Cognitive Scores: SLUMS: 17/30 and CPT: 4.8/5.6  Physical Function: Ambulating 175 ft with 4ww CGA  DME: No new DME needed    CODE STATUS/ADVANCE DIRECTIVES DISCUSSION:  No CPR- Pre-arrest intubation OK   ALLERGIES: Augmented betamethasone diprop [betamethasone], Petrolatum, Shellfish-derived products, Aspirin, Bacitracin, Bactrim [sulfamethoxazole-trimethoprim], Darvon [propoxyphene], Dilaudid [hydromorphone], Levaquin [levofloxacin], Lidocaine, Morphine, Neomycin, Neosporin [neomycin-polymyxin-gramicidin], Nitrofurantoin, Oxycodone, Percocet [oxycodone-acetaminophen], Percodan [oxycodone-aspirin], Polymyxin b, Pramoxine, Tramadol, Vicodin [hydrocodone-acetaminophen], Xarelto [rivaroxaban], Adhesive tape, Codeine, Hydrocortisone, and Other environmental allergy    NURSING FACILITY COURSE   Medication Changes/Rationale:   Apixaban reduced to 2.5 mg twice daily per oncology's recommendation    Summary of nursing facility stay:    Savanna Rehman is a pleasant 83-year-old female with a past medical history of atrial fibrillation, HFpEF, migraines, fibromyalgia and cirrhosis who was recently hospitalized at [facility].  Presented with  petechial rash and bruising.  Found to have severe thrombocytopenia with platelets of 2000.  Hematology was consulted and she was treated with 3 units platelets, IVIG and Decadron.  Platelets normalized prior to discharge.  Did require some IV diuresis due to volume resuscitation.  Additionally some confusion and hyperglycemia with Decadron that improved prior to discharge    Patient was admitted to TCU.  She progressed with physical therapy.  Cleared for discharge from physical therapy and OT standpoint.  Discharging back to independent living with home care.  Platelets are trending down after hospital discharge.  She is established with hematology.  Platelets are stable today.  She will have close outpatient follow-up.    Severe thrombocytopenia  ITP  Chronic thrombocytopenia: Platelets 2 on admission.  Improved to 165 at discharge after IV IVIG and Decadron.  Baseline   - PLT trending down at TCU but stable today at 57  - Plan for outpatient follow with repeat labs with Hematology 9/30  -Reiterated with patient to return to the ER if she develops bleeding, petechia or excessive bruising.      Steroid-induced encephalopathy: Hospital notes indicate some confusion while receiving Decadron.  Seem to improve throughout hospital stay.  Currently very pleasant.  - Lives in [facility]  - OT and social work consulted. SLUMS at TCU 17/30. CPT 4.8/5.6  -Discharging back to [facility] with family assistance and home care.    Klebsiella UTI, resolved: Completed course of ceftriaxone    Type 2 diabetes  Steroid-induced hyperglycemia: A1C 6.3.  Received Lantus during hospital stay but discontinued on discharge.  Continues on home glipizide  - Continue glipizide 2.5 mg twice daily  - Monitor blood glucoses twice daily. Overall appropriate    Atrial fibrillation s/p PPM:  -Not on rate controlling medications  - Warfarin transition to apixaban.  Reduced to 2.5 mg twice daily per oncology's recommendation in the setting of  thrombocytopenia    HFpEF: EF 55-60%, 2+ TR:  - Continue Lasix  - Monitor volume status    GERD  -Historically on Protonix but transition to Pepcid due to thrombocytopenia    CKD III:Estimated Creatinine Clearance: 54 mL/min (A) (based on SCr of 1.03 mg/dL (H)).  -Creatinine at baseline      Cirrhosis: Follows with [facility]  - Continue furosemide    Physical deconditioning:   -Home therapies    Anxiety:   - Continue Effexor    Discharge Medications:  MED REC REQUIRED  Post Medication Reconciliation Status: discharge medications reconciled and changed, per note/orders       Current Outpatient Medications   Medication Sig Dispense Refill     acetaminophen (TYLENOL) 500 MG tablet Take 1,000 mg by mouth every 6 hours as needed for mild pain       apixaban ANTICOAGULANT (ELIQUIS) 2.5 MG tablet Take 1 tablet (2.5 mg) by mouth 2 times daily. 60 tablet 0     butalbital-aspirin-caffeine (FIORINAL) -40 MG capsule Take 1 capsule by mouth every 4 hours as needed for headaches or migraine. 10 capsule 0     cholecalciferol (VITAMIN D3) 10 mcg (400 units) TABS tablet Take 10 mcg by mouth daily.       EPINEPHrine (ANY BX GENERIC EQUIV) 0.3 MG/0.3ML injection 2-pack Inject 0.3 mLs (0.3 mg) into the muscle as needed for anaphylaxis May repeat one time in 5-15 minutes if response to initial dose is inadequate. 2 each 3     escitalopram (LEXAPRO) 10 MG tablet Take 1 tablet (10 mg) by mouth at bedtime 90 tablet 1     famotidine (PEPCID) 10 MG tablet Take 1 tablet (10 mg) by mouth 2 times daily as needed (Heart burn). 60 tablet 0     furosemide (LASIX) 40 MG tablet Take 1 tablet (40 mg) by mouth daily for 90 days 90 tablet 0     glipiZIDE (GLUCOTROL XL) 2.5 MG 24 hr tablet Take 1 tablet by mouth twice daily 180 tablet 3     loperamide (IMODIUM) 2 MG capsule TAKE 1 CAPSULE BY MOUTH 4 TIMES DAILY AS NEEDED FOR DIARRHEA 60 capsule 0     melatonin 3 MG tablet Take 3 mg by mouth nightly as needed for sleep.       ondansetron (ZOFRAN ODT) 4  "MG ODT tab Take 4 mg by mouth every 6 hours as needed for nausea.          Controlled medications:   not applicable/none     Past Medical History:   Past Medical History:   Diagnosis Date     Acute encephalopathy 09/21/2023     Anemia     Iron Deficiency anemia     Atrial fibrillation (H)      CAD (coronary artery disease)     non-obstructive     Chronic pain     neck, low back, legs     Congestive heart failure (H)      Degenerative disk disease      Diabetes (H)      Fibromyalgia      Gastro-oesophageal reflux disease      Gout      Hiatal hernia      Mumps      Neuropathy      CRISTIANA (obstructive sleep apnea) - CPAP      Palpitations      Pernicious anemia      Sleep apnea     uses CPAP.     Urinary incontinence      Vitamin D deficiency      Physical Exam:   Vitals: /72   Pulse 75   Temp 97.5  F (36.4  C)   Resp 17   Ht 1.727 m (5' 8\")   Wt 104 kg (229 lb 3.2 oz)   LMP  (LMP Unknown)   SpO2 92%   BMI 34.85 kg/m    BMI: Body mass index is 34.85 kg/m .  Physical Exam  Vitals (Facility EMR) reviewed.   Constitutional:       General: She is not in acute distress.     Appearance: She is obese.   HENT:      Head: Normocephalic and atraumatic.   Eyes:      General: No scleral icterus.  Cardiovascular:      Rate and Rhythm: Normal rate and regular rhythm.   Pulmonary:      Effort: Pulmonary effort is normal.   Musculoskeletal:      Right lower leg: No edema.      Left lower leg: No edema.   Skin:     General: Skin is warm and dry.      Comments: Stable resolving reocclusion.  No petechiae.   Neurological:      Mental Status: She is alert. Mental status is at baseline.   Psychiatric:         Behavior: Behavior normal.           SNF labs: Recent labs in [facility] reviewed by me today.  and Most Recent 3 CBC's:  Recent Labs   Lab Test 09/19/24  0631 09/16/24  0601 09/13/24  0908   WBC 4.0 4.5 6.0   HGB 12.8 13.0 14.4   MCV 94 94 93   PLT 57* 59* 70*     Most Recent 3 BMP's:  Recent Labs   Lab Test 09/13/24  0908 " 09/07/24  0652 09/07/24  0646 09/03/24  0747 09/03/24  0652     --  136  --  135   POTASSIUM 4.2  --  3.9  --  3.7   CHLORIDE 97*  --  96*  --  99   CO2 29  --  29  --  25   BUN 19.9  --  44.9*  --  38.6*   CR 1.03*  --  1.17*  --  0.95   ANIONGAP 10  --  11  --  11   SAUL 9.5  --  9.1  --  8.9   * 147* 149*   < > 230*    < > = values in this interval not displayed.     Most Recent 2 LFT's:  Recent Labs   Lab Test 08/31/24  0714 08/30/24  1718   AST 46* 40   ALT 30 34   ALKPHOS 113 135   BILITOTAL 1.9* 1.2     Most Recent TSH and T4:  Recent Labs   Lab Test 07/05/24  1527   TSH 2.63     Most Recent Hemoglobin A1c:  Recent Labs   Lab Test 07/05/24  1547   A1C 6.3*     Most Recent 6 glucoses:  Recent Labs   Lab Test 09/13/24  0908 09/07/24  0652 09/07/24  0646 09/07/24  0203 09/06/24  2207 09/06/24  1617   * 147* 149* 126* 144* 183*       DISCHARGE PLAN:  Follow up labs: Labs per oncology  Medical Follow Up:      Follow up with primary care provider in 1 weeks  [facility] scheduled appointments:  Appointments in Next Year      Oct 02, 2024 1:30 PM  LAB with  MA LAB  [facility] ([facility] ) [phone]     Oct 02, 2024 2:00 PM  Return Patient with Patsy Pompa MD  [facility] ([facility] ) [phone]     Oct 18, 2024 12:00 AM  CARDIAC DEVICE CHECK - REMOTE with DHILLON TECH1  [facility] ([facility] ) [phone]       Discharge Services: Home Care:  Occupational Therapy, Physical Therapy, Registered Nurse, Home Health Aide, and From:  Billie   Discharge Instructions Verbalized to Patient at Discharge:   Driving is not recommended until cleared by PCP to resume.     TOTAL DISCHARGE TIME:   Greater than 30 minutes  Electronically signed by:  Penny Chen PA-C     Documentation of Face to Face and Certification for  Home Health Services    I certify that services are/were furnished while this patient was under the care of a physician and that a physician or an allowed non-physician practitioner (NPP), had a face-to-face encounter that meets the physician face-to-face encounter requirements. The encounter was in whole, or in part, related to the primary reason for home health. The patient is confined to his/her home and needs intermittent skilled nursing, physical therapy, speech-language pathology, or the continued need for occupational therapy. A plan of care has been established by a physician and is periodically reviewed by a physician.  Date of Face-to-Face Encounter: 9/19/2024.    I certify that, based on my findings, the following services are medically necessary home health services: Nursing, Occupational Therapy, Physical Therapy, and HHA .    My clinical findings support the need for the above skilled services because: Requires assistance of another person or specialized equipment to access medical services because patient: Requires supervision of another for safe transfer...    Patient to re-establish plan of care with their PCP within 7-10 days after leaving the facility to reestablish care.  Medicare certified PECOS provider: Penny Chen PA-C  Date: September 19, 2024                 Sincerely,        Penny Chen PA-C

## 2024-09-19 NOTE — PROGRESS NOTES
[facility] GERIATRICS DISCHARGE SUMMARY  PATIENT'S NAME: Savanna Rehman  YOB: 1940  MEDICAL RECORD NUMBER:  [id number]  Place of Service where encounter took place:  [facility] ([facility]) [[id number]]    PRIMARY CARE PROVIDER AND CLINIC RESPONSIBLE AFTER TRANSFER:   Taylor Payan MD, [address] / [facility] [address]    [facility] Provider     Transferring providers: Penny Chen PA-C, Dr. Silvia Alva MD  Recent Hospitalization/ED:  [facility] Hospital stay 8/30/24 to 9/7/24.  Date of SNF Admission: September 07, 2024  Date of SNF (anticipated) Discharge: September 20, 2024  Discharged to: previous independent home  Cognitive Scores: SLUMS: 17/30 and CPT: 4.8/5.6  Physical Function: Ambulating 175 ft with 4ww CGA  DME: No new DME needed    CODE STATUS/ADVANCE DIRECTIVES DISCUSSION:  No CPR- Pre-arrest intubation OK   ALLERGIES: Augmented betamethasone diprop [betamethasone], Petrolatum, Shellfish-derived products, Aspirin, Bacitracin, Bactrim [sulfamethoxazole-trimethoprim], Darvon [propoxyphene], Dilaudid [hydromorphone], Levaquin [levofloxacin], Lidocaine, Morphine, Neomycin, Neosporin [neomycin-polymyxin-gramicidin], Nitrofurantoin, Oxycodone, Percocet [oxycodone-acetaminophen], Percodan [oxycodone-aspirin], Polymyxin b, Pramoxine, Tramadol, Vicodin [hydrocodone-acetaminophen], Xarelto [rivaroxaban], Adhesive tape, Codeine, Hydrocortisone, and Other environmental allergy    NURSING FACILITY COURSE   Medication Changes/Rationale:   Apixaban reduced to 2.5 mg twice daily per oncology's recommendation    Summary of nursing facility stay:    Savanna Rehman is a pleasant 83-year-old female with a past medical history of atrial fibrillation, HFpEF, migraines, fibromyalgia and cirrhosis who was recently hospitalized at [facility].  Presented with petechial rash and bruising.  Found to have severe thrombocytopenia with platelets of 2000.   Hematology was consulted and she was treated with 3 units platelets, IVIG and Decadron.  Platelets normalized prior to discharge.  Did require some IV diuresis due to volume resuscitation.  Additionally some confusion and hyperglycemia with Decadron that improved prior to discharge    Patient was admitted to TCU.  She progressed with physical therapy.  Cleared for discharge from physical therapy and OT standpoint.  Discharging back to independent living with home care.  Platelets are trending down after hospital discharge.  She is established with hematology.  Platelets are stable today.  She will have close outpatient follow-up.    Severe thrombocytopenia  ITP  Chronic thrombocytopenia: Platelets 2 on admission.  Improved to 165 at discharge after IV IVIG and Decadron.  Baseline   - PLT trending down at TCU but stable today at 57  - Plan for outpatient follow with repeat labs with Hematology 9/30  -Reiterated with patient to return to the ER if she develops bleeding, petechia or excessive bruising.      Steroid-induced encephalopathy: Hospital notes indicate some confusion while receiving Decadron.  Seem to improve throughout hospital stay.  Currently very pleasant.  - Lives in [facility]  - OT and social work consulted. SLUMS at TCU 17/30. CPT 4.8/5.6  -Discharging back to [facility] with family assistance and home care.    Klebsiella UTI, resolved: Completed course of ceftriaxone    Type 2 diabetes  Steroid-induced hyperglycemia: A1C 6.3.  Received Lantus during hospital stay but discontinued on discharge.  Continues on home glipizide  - Continue glipizide 2.5 mg twice daily  - Monitor blood glucoses twice daily. Overall appropriate    Atrial fibrillation s/p PPM:  -Not on rate controlling medications  - Warfarin transition to apixaban.  Reduced to 2.5 mg twice daily per oncology's recommendation in the setting of thrombocytopenia    HFpEF: EF 55-60%, 2+ TR:  - Continue Lasix  - Monitor volume  status    GERD  -Historically on Protonix but transition to Pepcid due to thrombocytopenia    CKD III:Estimated Creatinine Clearance: 54 mL/min (A) (based on SCr of 1.03 mg/dL (H)).  -Creatinine at baseline      Cirrhosis: Follows with [facility]  - Continue furosemide    Physical deconditioning:   -Home therapies    Anxiety:   - Continue Effexor    Discharge Medications:  MED REC REQUIRED  Post Medication Reconciliation Status: discharge medications reconciled and changed, per note/orders       Current Outpatient Medications   Medication Sig Dispense Refill    acetaminophen (TYLENOL) 500 MG tablet Take 1,000 mg by mouth every 6 hours as needed for mild pain      apixaban ANTICOAGULANT (ELIQUIS) 2.5 MG tablet Take 1 tablet (2.5 mg) by mouth 2 times daily. 60 tablet 0    butalbital-aspirin-caffeine (FIORINAL) -40 MG capsule Take 1 capsule by mouth every 4 hours as needed for headaches or migraine. 10 capsule 0    cholecalciferol (VITAMIN D3) 10 mcg (400 units) TABS tablet Take 10 mcg by mouth daily.      EPINEPHrine (ANY BX GENERIC EQUIV) 0.3 MG/0.3ML injection 2-pack Inject 0.3 mLs (0.3 mg) into the muscle as needed for anaphylaxis May repeat one time in 5-15 minutes if response to initial dose is inadequate. 2 each 3    escitalopram (LEXAPRO) 10 MG tablet Take 1 tablet (10 mg) by mouth at bedtime 90 tablet 1    famotidine (PEPCID) 10 MG tablet Take 1 tablet (10 mg) by mouth 2 times daily as needed (Heart burn). 60 tablet 0    furosemide (LASIX) 40 MG tablet Take 1 tablet (40 mg) by mouth daily for 90 days 90 tablet 0    glipiZIDE (GLUCOTROL XL) 2.5 MG 24 hr tablet Take 1 tablet by mouth twice daily 180 tablet 3    loperamide (IMODIUM) 2 MG capsule TAKE 1 CAPSULE BY MOUTH 4 TIMES DAILY AS NEEDED FOR DIARRHEA 60 capsule 0    melatonin 3 MG tablet Take 3 mg by mouth nightly as needed for sleep.      ondansetron (ZOFRAN ODT) 4 MG ODT tab Take 4 mg by mouth every 6 hours as needed for nausea.          Controlled  "medications:   not applicable/none     Past Medical History:   Past Medical History:   Diagnosis Date    Acute encephalopathy 09/21/2023    Anemia     Iron Deficiency anemia    Atrial fibrillation (H)     CAD (coronary artery disease)     non-obstructive    Chronic pain     neck, low back, legs    Congestive heart failure (H)     Degenerative disk disease     Diabetes (H)     Fibromyalgia     Gastro-oesophageal reflux disease     Gout     Hiatal hernia     Mumps     Neuropathy     CRISTIANA (obstructive sleep apnea) - CPAP     Palpitations     Pernicious anemia     Sleep apnea     uses CPAP.    Urinary incontinence     Vitamin D deficiency      Physical Exam:   Vitals: /72   Pulse 75   Temp 97.5  F (36.4  C)   Resp 17   Ht 1.727 m (5' 8\")   Wt 104 kg (229 lb 3.2 oz)   LMP  (LMP Unknown)   SpO2 92%   BMI 34.85 kg/m    BMI: Body mass index is 34.85 kg/m .  Physical Exam  Vitals (Facility EMR) reviewed.   Constitutional:       General: She is not in acute distress.     Appearance: She is obese.   HENT:      Head: Normocephalic and atraumatic.   Eyes:      General: No scleral icterus.  Cardiovascular:      Rate and Rhythm: Normal rate and regular rhythm.   Pulmonary:      Effort: Pulmonary effort is normal.   Musculoskeletal:      Right lower leg: No edema.      Left lower leg: No edema.   Skin:     General: Skin is warm and dry.      Comments: Stable resolving reocclusion.  No petechiae.   Neurological:      Mental Status: She is alert. Mental status is at baseline.   Psychiatric:         Behavior: Behavior normal.           SNF labs: Recent labs in [facility] reviewed by me today.  and Most Recent 3 CBC's:  Recent Labs   Lab Test 09/19/24  0631 09/16/24  0601 09/13/24  0908   WBC 4.0 4.5 6.0   HGB 12.8 13.0 14.4   MCV 94 94 93   PLT 57* 59* 70*     Most Recent 3 BMP's:  Recent Labs   Lab Test 09/13/24  0908 09/07/24  0652 09/07/24  0646 09/03/24  0747 09/03/24  0652     --  136  --  135   POTASSIUM 4.2  --  " 3.9  --  3.7   CHLORIDE 97*  --  96*  --  99   CO2 29  --  29  --  25   BUN 19.9  --  44.9*  --  38.6*   CR 1.03*  --  1.17*  --  0.95   ANIONGAP 10  --  11  --  11   SAUL 9.5  --  9.1  --  8.9   * 147* 149*   < > 230*    < > = values in this interval not displayed.     Most Recent 2 LFT's:  Recent Labs   Lab Test 08/31/24  0714 08/30/24  1718   AST 46* 40   ALT 30 34   ALKPHOS 113 135   BILITOTAL 1.9* 1.2     Most Recent TSH and T4:  Recent Labs   Lab Test 07/05/24  1527   TSH 2.63     Most Recent Hemoglobin A1c:  Recent Labs   Lab Test 07/05/24  1547   A1C 6.3*     Most Recent 6 glucoses:  Recent Labs   Lab Test 09/13/24  0908 09/07/24  0652 09/07/24  0646 09/07/24  0203 09/06/24  2207 09/06/24  1617   * 147* 149* 126* 144* 183*       DISCHARGE PLAN:  Follow up labs: Labs per oncology  Medical Follow Up:      Follow up with primary care provider in 1 weeks  [facility] scheduled appointments:  Appointments in Next Year      Oct 02, 2024 1:30 PM  LAB with RH MA LAB  [facility] ([facility] ) [phone]     Oct 02, 2024 2:00 PM  Return Patient with Patsy Pompa MD  [facility] ([facility] ) [phone]     Oct 18, 2024 12:00 AM  CARDIAC DEVICE CHECK - REMOTE with DHILLON TECH1  [facility] ([facility] ) [phone]       Discharge Services: Home Care:  Occupational Therapy, Physical Therapy, Registered Nurse, Home Health Aide, and From:  [facility]ie   Discharge Instructions Verbalized to Patient at Discharge:   Driving is not recommended until cleared by PCP to resume.     TOTAL DISCHARGE TIME:   Greater than 30 minutes  Electronically signed by:  Penny Chen PA-C     Documentation of Face to Face and Certification for Home Health Services    I certify that services are/were furnished while this patient was under the care of a  physician and that a physician or an allowed non-physician practitioner (NPP), had a face-to-face encounter that meets the physician face-to-face encounter requirements. The encounter was in whole, or in part, related to the primary reason for home health. The patient is confined to his/her home and needs intermittent skilled nursing, physical therapy, speech-language pathology, or the continued need for occupational therapy. A plan of care has been established by a physician and is periodically reviewed by a physician.  Date of Face-to-Face Encounter: 9/19/2024.    I certify that, based on my findings, the following services are medically necessary home health services: Nursing, Occupational Therapy, Physical Therapy, and HHA .    My clinical findings support the need for the above skilled services because: Requires assistance of another person or specialized equipment to access medical services because patient: Requires supervision of another for safe transfer...    Patient to re-establish plan of care with their PCP within 7-10 days after leaving the facility to reestablish care.  Medicare certified PECOS provider: Penny Chen PA-C  Date: September 19, 2024

## 2024-09-19 NOTE — LETTER
9/19/2024      Savanna Rehman  [address]  [place] [address]        [facility] GERIATRICS DISCHARGE SUMMARY  PATIENT'S NAME: Savanna Rehman  YOB: 1940  MEDICAL RECORD NUMBER:  [id number]  Place of Service where encounter took place:  [facility] (TCU) [[id number]]    PRIMARY CARE PROVIDER AND CLINIC RESPONSIBLE AFTER TRANSFER:   Taylor Payan MD, [facility] [address] / [facility] [id number]    [facility] Provider     Transferring providers: Penny Chen PA-C, Dr. Silvia Alva MD  Recent Hospitalization/ED:  [facility] stay 8/30/24 to 9/7/24.  Date of SNF Admission: September 07, 2024  Date of SNF (anticipated) Discharge: September 20, 2024  Discharged to: previous independent home  Cognitive Scores: SLUMS: 17/30 and CPT: 4.8/5.6  Physical Function: Ambulating 175 ft with 4ww CGA  DME: No new DME needed    CODE STATUS/ADVANCE DIRECTIVES DISCUSSION:  No CPR- Pre-arrest intubation OK   ALLERGIES: Augmented betamethasone diprop [betamethasone], Petrolatum, Shellfish-derived products, Aspirin, Bacitracin, Bactrim [sulfamethoxazole-trimethoprim], Darvon [propoxyphene], Dilaudid [hydromorphone], Levaquin [levofloxacin], Lidocaine, Morphine, Neomycin, Neosporin [neomycin-polymyxin-gramicidin], Nitrofurantoin, Oxycodone, Percocet [oxycodone-acetaminophen], Percodan [oxycodone-aspirin], Polymyxin b, Pramoxine, Tramadol, Vicodin [hydrocodone-acetaminophen], Xarelto [rivaroxaban], Adhesive tape, Codeine, Hydrocortisone, and Other environmental allergy    NURSING FACILITY COURSE   Medication Changes/Rationale:   Apixaban reduced to 2.5 mg twice daily per oncology's recommendation    Summary of nursing facility stay:    Savanna Rehman is a pleasant 83-year-old female with a past medical history of atrial fibrillation, HFpEF, migraines, fibromyalgia and cirrhosis who was recently hospitalized at [facility].  Presented with  petechial rash and bruising.  Found to have severe thrombocytopenia with platelets of 2000.  Hematology was consulted and she was treated with 3 units platelets, IVIG and Decadron.  Platelets normalized prior to discharge.  Did require some IV diuresis due to volume resuscitation.  Additionally some confusion and hyperglycemia with Decadron that improved prior to discharge    Patient was admitted to TCU.  She progressed with physical therapy.  Cleared for discharge from physical therapy and OT standpoint.  Discharging back to independent living with home care.  Platelets are trending down after hospital discharge.  She is established with hematology.  Platelets are stable today.  She will have close outpatient follow-up.    Severe thrombocytopenia  ITP  Chronic thrombocytopenia: Platelets 2 on admission.  Improved to 165 at discharge after IV IVIG and Decadron.  Baseline   - PLT trending down at TCU but stable today at 57  - Plan for outpatient follow with repeat labs with Hematology 9/30  -Reiterated with patient to return to the ER if she develops bleeding, petechia or excessive bruising.      Steroid-induced encephalopathy: Hospital notes indicate some confusion while receiving Decadron.  Seem to improve throughout hospital stay.  Currently very pleasant.  - Lives in [facility]  - OT and social work consulted. SLUMS at TCU 17/30. CPT 4.8/5.6  -Discharging back to [facility] with family assistance and home care.    Klebsiella UTI, resolved: Completed course of ceftriaxone    Type 2 diabetes  Steroid-induced hyperglycemia: A1C 6.3.  Received Lantus during hospital stay but discontinued on discharge.  Continues on home glipizide  - Continue glipizide 2.5 mg twice daily  - Monitor blood glucoses twice daily. Overall appropriate    Atrial fibrillation s/p PPM:  -Not on rate controlling medications  - Warfarin transition to apixaban.  Reduced to 2.5 mg twice daily per oncology's recommendation in the setting of  thrombocytopenia    HFpEF: EF 55-60%, 2+ TR:  - Continue Lasix  - Monitor volume status    GERD  -Historically on Protonix but transition to Pepcid due to thrombocytopenia    CKD III:Estimated Creatinine Clearance: 54 mL/min (A) (based on SCr of 1.03 mg/dL (H)).  -Creatinine at baseline      Cirrhosis: Follows with [facility]  - Continue furosemide    Physical deconditioning:   -Home therapies    Anxiety:   - Continue Effexor    Discharge Medications:  MED REC REQUIRED  Post Medication Reconciliation Status: discharge medications reconciled and changed, per note/orders       Current Outpatient Medications   Medication Sig Dispense Refill     acetaminophen (TYLENOL) 500 MG tablet Take 1,000 mg by mouth every 6 hours as needed for mild pain       apixaban ANTICOAGULANT (ELIQUIS) 2.5 MG tablet Take 1 tablet (2.5 mg) by mouth 2 times daily. 60 tablet 0     butalbital-aspirin-caffeine (FIORINAL) -40 MG capsule Take 1 capsule by mouth every 4 hours as needed for headaches or migraine. 10 capsule 0     cholecalciferol (VITAMIN D3) 10 mcg (400 units) TABS tablet Take 10 mcg by mouth daily.       EPINEPHrine (ANY BX GENERIC EQUIV) 0.3 MG/0.3ML injection 2-pack Inject 0.3 mLs (0.3 mg) into the muscle as needed for anaphylaxis May repeat one time in 5-15 minutes if response to initial dose is inadequate. 2 each 3     escitalopram (LEXAPRO) 10 MG tablet Take 1 tablet (10 mg) by mouth at bedtime 90 tablet 1     famotidine (PEPCID) 10 MG tablet Take 1 tablet (10 mg) by mouth 2 times daily as needed (Heart burn). 60 tablet 0     furosemide (LASIX) 40 MG tablet Take 1 tablet (40 mg) by mouth daily for 90 days 90 tablet 0     glipiZIDE (GLUCOTROL XL) 2.5 MG 24 hr tablet Take 1 tablet by mouth twice daily 180 tablet 3     loperamide (IMODIUM) 2 MG capsule TAKE 1 CAPSULE BY MOUTH 4 TIMES DAILY AS NEEDED FOR DIARRHEA 60 capsule 0     melatonin 3 MG tablet Take 3 mg by mouth nightly as needed for sleep.       ondansetron (ZOFRAN ODT) 4  "MG ODT tab Take 4 mg by mouth every 6 hours as needed for nausea.          Controlled medications:   not applicable/none     Past Medical History:   Past Medical History:   Diagnosis Date     Acute encephalopathy 09/21/2023     Anemia     Iron Deficiency anemia     Atrial fibrillation (H)      CAD (coronary artery disease)     non-obstructive     Chronic pain     neck, low back, legs     Congestive heart failure (H)      Degenerative disk disease      Diabetes (H)      Fibromyalgia      Gastro-oesophageal reflux disease      Gout      Hiatal hernia      Mumps      Neuropathy      CRISTIANA (obstructive sleep apnea) - CPAP      Palpitations      Pernicious anemia      Sleep apnea     uses CPAP.     Urinary incontinence      Vitamin D deficiency      Physical Exam:   Vitals: /72   Pulse 75   Temp 97.5  F (36.4  C)   Resp 17   Ht 1.727 m (5' 8\")   Wt 104 kg (229 lb 3.2 oz)   LMP  (LMP Unknown)   SpO2 92%   BMI 34.85 kg/m    BMI: Body mass index is 34.85 kg/m .  Physical Exam  Vitals (Facility EMR) reviewed.   Constitutional:       General: She is not in acute distress.     Appearance: She is obese.   HENT:      Head: Normocephalic and atraumatic.   Eyes:      General: No scleral icterus.  Cardiovascular:      Rate and Rhythm: Normal rate and regular rhythm.   Pulmonary:      Effort: Pulmonary effort is normal.   Musculoskeletal:      Right lower leg: No edema.      Left lower leg: No edema.   Skin:     General: Skin is warm and dry.      Comments: Stable resolving reocclusion.  No petechiae.   Neurological:      Mental Status: She is alert. Mental status is at baseline.   Psychiatric:         Behavior: Behavior normal.           SNF labs: Recent labs in [facility] reviewed by me today.  and Most Recent 3 CBC's:  Recent Labs   Lab Test 09/19/24  0631 09/16/24  0601 09/13/24  0908   WBC 4.0 4.5 6.0   HGB 12.8 13.0 14.4   MCV 94 94 93   PLT 57* 59* 70*     Most Recent 3 BMP's:  Recent Labs   Lab Test 09/13/24  0908 " 09/07/24  0652 09/07/24  0646 09/03/24  0747 09/03/24  0652     --  136  --  135   POTASSIUM 4.2  --  3.9  --  3.7   CHLORIDE 97*  --  96*  --  99   CO2 29  --  29  --  25   BUN 19.9  --  44.9*  --  38.6*   CR 1.03*  --  1.17*  --  0.95   ANIONGAP 10  --  11  --  11   SAUL 9.5  --  9.1  --  8.9   * 147* 149*   < > 230*    < > = values in this interval not displayed.     Most Recent 2 LFT's:  Recent Labs   Lab Test 08/31/24  0714 08/30/24  1718   AST 46* 40   ALT 30 34   ALKPHOS 113 135   BILITOTAL 1.9* 1.2     Most Recent TSH and T4:  Recent Labs   Lab Test 07/05/24  1527   TSH 2.63     Most Recent Hemoglobin A1c:  Recent Labs   Lab Test 07/05/24  1547   A1C 6.3*     Most Recent 6 glucoses:  Recent Labs   Lab Test 09/13/24  0908 09/07/24  0652 09/07/24  0646 09/07/24  0203 09/06/24  2207 09/06/24  1617   * 147* 149* 126* 144* 183*       DISCHARGE PLAN:  Follow up labs: Labs per oncology  Medical Follow Up:      Follow up with primary care provider in 1 weeks  [facility] scheduled appointments:  Appointments in Next Year      Oct 02, 2024 1:30 PM  LAB with  MA LAB  [facility] ([facility] ) [phone]     Oct 02, 2024 2:00 PM  Return Patient with Patsy Pompa MD  [facility] ([facility] ) [phone]     Oct 18, 2024 12:00 AM  CARDIAC DEVICE CHECK - REMOTE with DHILLON TECH1  [facility] ([facility] ) [phone]       Discharge Services: Home Care:  Occupational Therapy, Physical Therapy, Registered Nurse, Home Health Aide, and From:  [facility]   Discharge Instructions Verbalized to Patient at Discharge:   Driving is not recommended until cleared by PCP to resume.     TOTAL DISCHARGE TIME:   Greater than 30 minutes  Electronically signed by:  Penny Chen PA-C     Documentation of Face to Face and Certification for  Home Health Services    I certify that services are/were furnished while this patient was under the care of a physician and that a physician or an allowed non-physician practitioner (NPP), had a face-to-face encounter that meets the physician face-to-face encounter requirements. The encounter was in whole, or in part, related to the primary reason for home health. The patient is confined to his/her home and needs intermittent skilled nursing, physical therapy, speech-language pathology, or the continued need for occupational therapy. A plan of care has been established by a physician and is periodically reviewed by a physician.  Date of Face-to-Face Encounter: 9/19/2024.    I certify that, based on my findings, the following services are medically necessary home health services: Nursing, Occupational Therapy, Physical Therapy, and HHA .    My clinical findings support the need for the above skilled services because: Requires assistance of another person or specialized equipment to access medical services because patient: Requires supervision of another for safe transfer...    Patient to re-establish plan of care with their PCP within 7-10 days after leaving the facility to reestablish care.  Medicare certified [facility]OS provider: Penny Chen PA-C  Date: September 19, 2024                 Sincerely,        Penny Chen PA-C

## 2024-09-23 ENCOUNTER — TELEPHONE (OUTPATIENT)
Dept: INTERNAL MEDICINE | Facility: CLINIC | Age: 84
End: 2024-09-23
Payer: COMMERCIAL

## 2024-09-23 NOTE — TELEPHONE ENCOUNTER
Fannie from [facility] calling for skilled nursing     [phone]- secure voicemail     Needs to be reordered due to hospital discharge  to tcu       Home Care is calling regarding an established patient with [facility].       Requesting orders from: Taylor Payan  Provider is following patient: Yes  Is this a 60-day recertification request?  Yes    Orders Requested    Skilled Nursing  Request for recertification   Frequency: Skilled nursing once weekly   Effective 9/24/24      Information was gathered and will be sent to provider for review.  RN will contact Home Care with information after provider review.  Information was gathered and will be sent to provider to confirm provider will be following patient.  RN will contact Home Care with information after provider review.  Confirmed ok to leave a detailed message with call back.    Malathi Pineda, RN

## 2024-09-24 ENCOUNTER — MEDICAL CORRESPONDENCE (OUTPATIENT)
Dept: HEALTH INFORMATION MANAGEMENT | Facility: CLINIC | Age: 84
End: 2024-09-24
Payer: COMMERCIAL

## 2024-09-24 ENCOUNTER — TELEPHONE (OUTPATIENT)
Dept: INTERNAL MEDICINE | Facility: CLINIC | Age: 84
End: 2024-09-24
Payer: COMMERCIAL

## 2024-09-24 NOTE — TELEPHONE ENCOUNTER
S-(situation): Raj HORTON with [facility]  calls to report that patient can't afford Eliquis ($300 for a 30-day supply) and would like to ask provider for any recommendation.     B-(background): Patient was hospitalized on 08/30 to 09/07, Warfarin was discontinued/switched to Eliquis.     A-(assessment): Patient went to  meds at the pharmacy but a 30-day supply costs $300. Patient said she can't afford it.     R-(recommendations): Nurse Raj is asking for provider's advise. Does patient need to go back on warfarin?     Can LM to Raj HORTON at [phone]

## 2024-09-25 NOTE — TELEPHONE ENCOUNTER
Patient could reach out the [facility] to see if she qualifies for any assistance, such as receiving Eliquis through the . This can take about a month to get figured out though.    [facility] is available to patients who have high prescription drug costs and may be having difficulty paying for their medications.  Please call #[phone].      Kathy Bah, Jamie  Medication Therapy Management Pharmacist  Voicemail: [phone]

## 2024-09-25 NOTE — TELEPHONE ENCOUNTER
Spoke with CLIFFORD Anthony and advised of primary care provider and Pharm D recommendations.  She will speak with patient and assist her in reaching out to oncology and to [facility] prescription assistance program regarding cost of Eliquis.  BENITA Graham R.N.

## 2024-09-25 NOTE — TELEPHONE ENCOUNTER
Does patient have any Eliquis left over from discharge?  Has an upcoming appointment with oncology on 9/30/2024 and this should be discussed with the oncology team.  I have also included the pharmacist on this message to help see if they  could help the patient out with the cost concerns with the Eliquis.

## 2024-09-26 ENCOUNTER — PATIENT OUTREACH (OUTPATIENT)
Dept: ONCOLOGY | Facility: CLINIC | Age: 84
End: 2024-09-26
Payer: COMMERCIAL

## 2024-09-26 NOTE — PROGRESS NOTES
"Writer returned call to patient after receiving VM inquiring about her Eliquis dose. She indicates she sas not taken Eliquis since her discharge from the TCU, on 9/20/24. She believes she was receiving Eliquis while at the TCU, although she admits she is \"confused about her medications\". She is aware that an order for Eliquis was sent to [facility], but indicates that she cannot afford the co-pay, which she reports would be about $900 for a 3 month supply. She indicates she thought she was going to get a free month's supply, but was not sure about this.     Patient indicates she has a home health nurse, Fannie (P: [phone]), who manages all her medications, and asked writer to call her to review.    Writer called F[facility], who confirms that patient has not been on Eliquis since her discharge from the TCU. Writer relayed that Dr. Pompa's recommendation at this time is for the patient to be on 2.5mg BID. Fannie is inquiring if it would be appropriate for patient to transition back to Warfarin, as this was cost effective for her, or if there is a different anti-coagulation medication she could be prescribed. Writer also reviewed that patients, if applicable are sometimes able to apply for assistance with co-pays for Eliquis, but this does usually take some time to process.     Will route to provider for review and advisement. Will plan to follow up with Fannie once response is received.     Nafisa Fernandez, RN, BSN, PHN, OCN     9/26/2024 at 11:57 AM  Nurse Care Coordinator  [facility] [place]  P: [phone]   F: [phone]    "

## 2024-09-26 NOTE — TELEPHONE ENCOUNTER
Raj with [facility] is calling to follow up on anticoagulant for pt.  Reports that pt was on eliquis when she was inpatient.  She was discharged home from TCU on 9/20/2024, and Raj states that pt has not been on any anticoagulant since at least 9/20/2024.  Raj states that she has been trying to reach TCU to verify if pt was on anticoagulant while at TCU, but she has not been able to reach anyone at the TCU facility today.     Pt has told Raj that she cannot afford eliquis.  Pt does have a follow up appt with Dr Pompa on 9/30/2024, but Raj does not want to wait until 9/30 to determine anticoagulant Rx.    Will route to RN[facility] for follow up.  La Jackson RN on 9/26/2024 at 9:43 AM

## 2024-09-27 NOTE — TELEPHONE ENCOUNTER
Left detailed message on Evelin's voicemail    Summer RN 2:07 PM September 27, 2024   [facility]

## 2024-09-27 NOTE — TELEPHONE ENCOUNTER
DAIJA per Dr. Pompa: patient to take low dose Asprin once daily until Monday, at which time she will meet with him to review plan for anti-coagulation.     Writer called Raj, home health nurse who manages patient's medications, to relay verbal order. Raj gave re-back of order and will relay this to patient directly, and assist patient with this medication.     Nafisa Fernandez RN, BSN, PHN, OCN     9/27/2024 at 10:29 AM  Nurse Care Coordinator  [facility] [place]  P: [phone]   F: [phone]

## 2024-09-30 ENCOUNTER — LAB (OUTPATIENT)
Dept: ONCOLOGY | Facility: CLINIC | Age: 84
End: 2024-09-30
Attending: INTERNAL MEDICINE
Payer: COMMERCIAL

## 2024-09-30 ENCOUNTER — ONCOLOGY VISIT (OUTPATIENT)
Dept: ONCOLOGY | Facility: CLINIC | Age: 84
End: 2024-09-30
Attending: PHYSICIAN ASSISTANT
Payer: COMMERCIAL

## 2024-09-30 VITALS
RESPIRATION RATE: 16 BRPM | BODY MASS INDEX: 35.75 KG/M2 | HEIGHT: 68 IN | HEART RATE: 66 BPM | TEMPERATURE: 97.8 F | SYSTOLIC BLOOD PRESSURE: 107 MMHG | OXYGEN SATURATION: 94 % | DIASTOLIC BLOOD PRESSURE: 60 MMHG | WEIGHT: 235.9 LBS

## 2024-09-30 DIAGNOSIS — D69.3 IDIOPATHIC THROMBOCYTOPENIC PURPURA (H): ICD-10-CM

## 2024-09-30 LAB
BASOPHILS # BLD AUTO: 0.1 10E3/UL (ref 0–0.2)
BASOPHILS NFR BLD AUTO: 1 %
EOSINOPHIL # BLD AUTO: 0.2 10E3/UL (ref 0–0.7)
EOSINOPHIL NFR BLD AUTO: 3 %
ERYTHROCYTE [DISTWIDTH] IN BLOOD BY AUTOMATED COUNT: 13.8 % (ref 10–15)
HCT VFR BLD AUTO: 40.2 % (ref 35–47)
HGB BLD-MCNC: 13.3 G/DL (ref 11.7–15.7)
IMM GRANULOCYTES # BLD: 0 10E3/UL
IMM GRANULOCYTES NFR BLD: 0 %
LYMPHOCYTES # BLD AUTO: 1.7 10E3/UL (ref 0.8–5.3)
LYMPHOCYTES NFR BLD AUTO: 31 %
MCH RBC QN AUTO: 30.6 PG (ref 26.5–33)
MCHC RBC AUTO-ENTMCNC: 33.1 G/DL (ref 31.5–36.5)
MCV RBC AUTO: 92 FL (ref 78–100)
MONOCYTES # BLD AUTO: 0.8 10E3/UL (ref 0–1.3)
MONOCYTES NFR BLD AUTO: 13 %
NEUTROPHILS # BLD AUTO: 2.9 10E3/UL (ref 1.6–8.3)
NEUTROPHILS NFR BLD AUTO: 51 %
NRBC # BLD AUTO: 0 10E3/UL
NRBC BLD AUTO-RTO: 0 /100
PLATELET # BLD AUTO: 76 10E3/UL (ref 150–450)
RBC # BLD AUTO: 4.35 10E6/UL (ref 3.8–5.2)
WBC # BLD AUTO: 5.6 10E3/UL (ref 4–11)

## 2024-09-30 PROCEDURE — 85014 HEMATOCRIT: CPT | Performed by: PHYSICIAN ASSISTANT

## 2024-09-30 PROCEDURE — 99214 OFFICE O/P EST MOD 30 MIN: CPT | Performed by: INTERNAL MEDICINE

## 2024-09-30 PROCEDURE — 85025 COMPLETE CBC W/AUTO DIFF WBC: CPT | Performed by: PHYSICIAN ASSISTANT

## 2024-09-30 PROCEDURE — 36415 COLL VENOUS BLD VENIPUNCTURE: CPT

## 2024-09-30 PROCEDURE — G0463 HOSPITAL OUTPT CLINIC VISIT: HCPCS | Performed by: INTERNAL MEDICINE

## 2024-09-30 ASSESSMENT — PAIN SCALES - GENERAL: PAINLEVEL: NO PAIN (0)

## 2024-09-30 NOTE — NURSING NOTE
"Oncology Rooming Note    September 30, 2024 2:13 PM   Savanna Rehman is a 83 year old female who presents for:    Chief Complaint   Patient presents with    Oncology Clinic Visit     Initial Vitals: /60   Pulse 66   Temp 97.8  F (36.6  C) (Temporal)   Resp 16   Ht 1.727 m (5' 7.99\")   Wt 107 kg (235 lb 14.4 oz)   LMP  (LMP Unknown)   SpO2 94%   BMI 35.88 kg/m   Estimated body mass index is 35.88 kg/m  as calculated from the following:    Height as of this encounter: 1.727 m (5' 7.99\").    Weight as of this encounter: 107 kg (235 lb 14.4 oz). Body surface area is 2.27 meters squared.  No Pain (0) Comment: Data Unavailable   No LMP recorded (lmp unknown). Patient is postmenopausal.  Allergies reviewed: Yes  Medications reviewed: Yes    Medications: Medication refills not needed today.  Pharmacy name entered into [employer]: [facility] [address] [place] [address]    Frailty Screening:   Is the patient here for a new oncology consult visit in cancer care? 2. No      Clinical concerns: Follow up     States she has a history of diverticulitis on the left side. Wondering if this is gone?       Kusum Tracey MA              "

## 2024-09-30 NOTE — PROGRESS NOTES
[facility]    Hematology/Oncology Consult Note      Date of Consult:  09/30/24   Reason for Consult: Thrombocytopenia-ITP follow up  Oncology/Hematology Physician: MD Aletha    Impression/Plan:   Weekly CBC for ITP not in remission  Repeat Cbc on 9/30/2024 at 76K  Return in 2 weeks for MD visit and labs- If PLT 30-50K may consider Promacta 25 mg po daily or Nplate  For notable bleeding or petechia- patient to present to the ED    Anticoagulation for Afib,I had recommended Eliquis to 2.5 mg po bid given her age, low plts and borderline low EGFR. Cost was prohibitive. Patient and her daughter will ask their pharmacist and evaluate for her healthcare plan. Currently on Asa 81 mg daily.    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 PLC 152K 9/5/2024--> 70K on 9/13/2024 and 59K on 9/16/2024. CT imaging without malignancy however cirrhosis and splenomegaly plus diverticulosis  Atrial fibrillation on chronic anticoagulation.  Cirrhosis.  Congestive heart failure.  Coronary artery disease.  Diabetes mellitus type 2.  Fibromyalgia.  GERD.  Gout.  Sleep apnea.       History of present illness: Savanna Rehman is a 83 year old patient admitted to [facility] for PLT count of 2K presented with petechia. NO precipitating factors. CT imaging without malignancy however cirrhosis and splenomegaly plus diverticulosis noted. Baseline plt ~100K with underlying cirrhosis. Given abrupt drop presume cause of acute thrombocytopenia is ITP. Reports recent initiation of Protonix. S/p IVIG 8/31/24 and 9/1/24 S/p Dex 40mg x 4 days (8/31-9/3/24). PLT improved. Switched to Eliquis from coumadin.     Interim history  She was recently discharged to the TCU and her plts have been slowly dropping to 59K on 9/16. She denied any bleeding- she is back home- daughter present during the visit. PLC at 76K on 9/30 she currently is on asa 81 mg  daily.     Review of Systems: See interval hx. Denies fevers, chills, HA, dizziness, n/t, changes in vision, cough, sore throat, CP, SOB, abdominal pain, N/V, diarrhea, changes in urination, bleeding, bruising, rash.     PMHx and Social Hx reviewed per EPIC.    Medications:  Current Outpatient Medications   Medication Sig Dispense Refill    cholecalciferol (VITAMIN D3) 10 mcg (400 units) TABS tablet Take 10 mcg by mouth daily.      EPINEPHrine (ANY BX GENERIC EQUIV) 0.3 MG/0.3ML injection 2-pack Inject 0.3 mLs (0.3 mg) into the muscle as needed for anaphylaxis May repeat one time in 5-15 minutes if response to initial dose is inadequate. 2 each 3    escitalopram (LEXAPRO) 10 MG tablet Take 1 tablet (10 mg) by mouth at bedtime 90 tablet 1    famotidine (PEPCID) 10 MG tablet Take 1 tablet (10 mg) by mouth 2 times daily as needed (Heart burn). 60 tablet 0    furosemide (LASIX) 40 MG tablet Take 1 tablet (40 mg) by mouth daily for 90 days 90 tablet 0    glipiZIDE (GLUCOTROL XL) 2.5 MG 24 hr tablet Take 1 tablet by mouth twice daily 180 tablet 3    loperamide (IMODIUM) 2 MG capsule TAKE 1 CAPSULE BY MOUTH 4 TIMES DAILY AS NEEDED FOR DIARRHEA 60 capsule 0    acetaminophen (TYLENOL) 500 MG tablet Take 1,000 mg by mouth every 6 hours as needed for mild pain (Patient not taking: Reported on 9/30/2024)      apixaban ANTICOAGULANT (ELIQUIS) 2.5 MG tablet Take 1 tablet (2.5 mg) by mouth 2 times daily. (Patient not taking: Reported on 9/30/2024) 60 tablet 0    butalbital-aspirin-caffeine (FIORINAL) -40 MG capsule Take 1 capsule by mouth every 4 hours as needed for headaches or migraine. (Patient not taking: Reported on 9/30/2024) 10 capsule 0    melatonin 3 MG tablet Take 3 mg by mouth nightly as needed for sleep. (Patient not taking: Reported on 9/30/2024)      ondansetron (ZOFRAN ODT) 4 MG ODT tab Take 4 mg by mouth every 6 hours as needed for nausea. (Patient not taking: Reported on 9/30/2024)         Allergies  "  Allergen Reactions    Augmented Betamethasone Diprop [Betamethasone] Other (See Comments)     Severe yeast infection    Petrolatum Anaphylaxis and Swelling     Rash and swelling    Shellfish-Derived Products Anaphylaxis     Tongue swelling    Aspirin Swelling     tongue swelling    Bacitracin      Rash swelling    Bactrim [Sulfamethoxazole-Trimethoprim] Dizziness    Darvon [Propoxyphene] Swelling     Throat closes    Dilaudid [Hydromorphone]      No side effects from fentanyl June 2023    Levaquin [Levofloxacin] Swelling     Tongue swelling    Lidocaine      Facial swelling     Morphine Unknown     Per [facility] 2/23/24    Neomycin Swelling     rash    Neosporin [Neomycin-Polymyxin-Gramicidin] Swelling     rash    Nitrofurantoin      SOB, GI upset,    Oxycodone      Severe itching    Percocet [Oxycodone-Acetaminophen] Unknown    Percodan [Oxycodone-Aspirin]      Severe itching    Polymyxin B     Pramoxine     Tramadol     Vicodin [Hydrocodone-Acetaminophen]      Severe itching      Xarelto [Rivaroxaban]     Adhesive Tape Rash     Band aids     Codeine Rash    Hydrocortisone Rash and Swelling    Other Environmental Allergy Rash     Adhesive tape   Band aids          EXAM:    /60   Pulse 66   Temp 97.8  F (36.6  C) (Temporal)   Resp 16   Ht 1.727 m (5' 7.99\")   Wt 107 kg (235 lb 14.4 oz)   LMP  (LMP Unknown)   SpO2 94%   BMI 35.88 kg/m      GENERAL:  female, in no acute distress.  Alert and oriented x3.   HEENT:  Normocephalic, atraumatic.  PERRL, oropharynx clear with no sores or thrush.   LYMPH NODES:  No visible cervical, axillary lymphadenopathy appreciated.  LUNGS:  no wheezing  ABDOMEN:  Soft, nontender and nondistended.  Bowel sounds heard x4.  No apparent hepatosplenomegaly.   PSYCH: Calm and cooperative       Labs: CBC RESULTS:   Recent Labs   Lab Test 09/16/24  0601   WBC 4.5   RBC 4.20   HGB 13.0   HCT 39.5   MCV 94   MCH 31.0   MCHC 32.9   RDW 14.3   PLT 59*        30 minutes " spent on the date of the encounter doing chart review, review of test results, interpretation of tests, patient visit, and documentation         Patsy Pompa  Hematology/Oncology  [facility]

## 2024-09-30 NOTE — LETTER
9/30/2024      Savanna Rehman  [address]  [place] [address]      Dear Colleague,    Thank you for referring your patient, Savanna Rehman, to the [facility]. Please see a copy of my visit note below.    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 Cost was prohibitive. Patient and her daughter will ask their pharmacist and evaluate for her healthcare plan. Currently on Asa 81 mg daily.    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 PLC 152K 9/5/2024--> 70K on 9/13/2024 and 59K on 9/16/2024. CT imaging without malignancy however cirrhosis and splenomegaly plus diverticulosis  Atrial fibrillation on chronic anticoagulation.  Cirrhosis.  Congestive heart failure.  Coronary artery disease.  Diabetes mellitus type 2.  Fibromyalgia.  GERD.  Gout.  Sleep apnea.       History of present illness: Savanna Rehman is a 83 year old patient admitted to [facility] for PLT count of 2K presented with petechia. NO precipitating factors. CT imaging without malignancy however cirrhosis and splenomegaly plus diverticulosis noted. Baseline plt ~100K with underlying cirrhosis. Given abrupt drop presume cause of acute thrombocytopenia is ITP. Reports recent initiation of Protonix. S/p IVIG 8/31/24 and 9/1/24 S/p Dex 40mg x 4 days (8/31-9/3/24). PLT improved. Switched to  Eliquis from coumadin.     Interim history  She was recently discharged to the TCU and her plts have been slowly dropping to 59K on 9/16. She denied any bleeding- she is back home- daughter present during the visit. PLC at 76K on 9/30 she currently is on asa 81 mg daily.     Review of Systems: See interval hx. Denies fevers, chills, HA, dizziness, n/t, changes in vision, cough, sore throat, CP, SOB, abdominal pain, N/V, diarrhea, changes in urination, bleeding, bruising, rash.     PMHx and Social Hx reviewed per EPIC.    Medications:  Current Outpatient Medications   Medication Sig Dispense Refill     cholecalciferol (VITAMIN D3) 10 mcg (400 units) TABS tablet Take 10 mcg by mouth daily.       EPINEPHrine (ANY BX GENERIC EQUIV) 0.3 MG/0.3ML injection 2-pack Inject 0.3 mLs (0.3 mg) into the muscle as needed for anaphylaxis May repeat one time in 5-15 minutes if response to initial dose is inadequate. 2 each 3     escitalopram (LEXAPRO) 10 MG tablet Take 1 tablet (10 mg) by mouth at bedtime 90 tablet 1     famotidine (PEPCID) 10 MG tablet Take 1 tablet (10 mg) by mouth 2 times daily as needed (Heart burn). 60 tablet 0     furosemide (LASIX) 40 MG tablet Take 1 tablet (40 mg) by mouth daily for 90 days 90 tablet 0     glipiZIDE (GLUCOTROL XL) 2.5 MG 24 hr tablet Take 1 tablet by mouth twice daily 180 tablet 3     loperamide (IMODIUM) 2 MG capsule TAKE 1 CAPSULE BY MOUTH 4 TIMES DAILY AS NEEDED FOR DIARRHEA 60 capsule 0     acetaminophen (TYLENOL) 500 MG tablet Take 1,000 mg by mouth every 6 hours as needed for mild pain (Patient not taking: Reported on 9/30/2024)       apixaban ANTICOAGULANT (ELIQUIS) 2.5 MG tablet Take 1 tablet (2.5 mg) by mouth 2 times daily. (Patient not taking: Reported on 9/30/2024) 60 tablet 0     butalbital-aspirin-caffeine (FIORINAL) -40 MG capsule Take 1 capsule by mouth every 4 hours as needed for headaches or migraine. (Patient not taking: Reported on 9/30/2024) 10 capsule 0      "melatonin 3 MG tablet Take 3 mg by mouth nightly as needed for sleep. (Patient not taking: Reported on 9/30/2024)       ondansetron (ZOFRAN ODT) 4 MG ODT tab Take 4 mg by mouth every 6 hours as needed for nausea. (Patient not taking: Reported on 9/30/2024)         Allergies   Allergen Reactions     Augmented Betamethasone Diprop [Betamethasone] Other (See Comments)     Severe yeast infection     Petrolatum Anaphylaxis and Swelling     Rash and swelling     Shellfish-Derived Products Anaphylaxis     Tongue swelling     Aspirin Swelling     tongue swelling     Bacitracin      Rash swelling     Bactrim [Sulfamethoxazole-Trimethoprim] Dizziness     Darvon [Propoxyphene] Swelling     Throat closes     Dilaudid [Hydromorphone]      No side effects from fentanyl June 2023     Levaquin [Levofloxacin] Swelling     Tongue swelling     Lidocaine      Facial swelling      Morphine Unknown     Per [facility] 2/23/24     Neomycin Swelling     rash     Neosporin [Neomycin-Polymyxin-Gramicidin] Swelling     rash     Nitrofurantoin      SOB, GI upset,     Oxycodone      Severe itching     Percocet [Oxycodone-Acetaminophen] Unknown     Percodan [Oxycodone-Aspirin]      Severe itching     Polymyxin B      Pramoxine      Tramadol      Vicodin [Hydrocodone-Acetaminophen]      Severe itching       Xarelto [Rivaroxaban]      Adhesive Tape Rash     Band aids      Codeine Rash     Hydrocortisone Rash and Swelling     Other Environmental Allergy Rash     Adhesive tape   Band aids          EXAM:    /60   Pulse 66   Temp 97.8  F (36.6  C) (Temporal)   Resp 16   Ht 1.727 m (5' 7.99\")   Wt 107 kg (235 lb 14.4 oz)   LMP  (LMP Unknown)   SpO2 94%   BMI 35.88 kg/m      GENERAL:  female, in no acute distress.  Alert and oriented x3.   HEENT:  Normocephalic, atraumatic.  PERRL, oropharynx clear with no sores or thrush.   LYMPH NODES:  No visible cervical, axillary lymphadenopathy appreciated.  LUNGS:  no wheezing  ABDOMEN:  " Soft, nontender and nondistended.  Bowel sounds heard x4.  No apparent hepatosplenomegaly.   PSYCH: Calm and cooperative       Labs: CBC RESULTS:   Recent Labs   Lab Test 09/16/24  0601   WBC 4.5   RBC 4.20   HGB 13.0   HCT 39.5   MCV 94   MCH 31.0   MCHC 32.9   RDW 14.3   PLT 59*        30 minutes spent on the date of the encounter doing chart review, review of test results, interpretation of tests, patient visit, and documentation         Patsy Pompa  Hematology/Oncology  [facility]                 Again, thank you for allowing me to participate in the care of your patient.        Sincerely,        Patsy Pompa MD

## 2024-10-04 ENCOUNTER — TELEPHONE (OUTPATIENT)
Dept: INTERNAL MEDICINE | Facility: CLINIC | Age: 84
End: 2024-10-04
Payer: COMMERCIAL

## 2024-10-04 DIAGNOSIS — I50.9 CONGESTIVE HEART FAILURE, UNSPECIFIED HF CHRONICITY, UNSPECIFIED HEART FAILURE TYPE (H): ICD-10-CM

## 2024-10-04 DIAGNOSIS — M79.89 SWELLING OF LOWER LEG: ICD-10-CM

## 2024-10-04 RX ORDER — FUROSEMIDE 40 MG/1
40 TABLET ORAL DAILY
Qty: 90 TABLET | Refills: 1 | Status: SHIPPED | OUTPATIENT
Start: 2024-10-04

## 2024-10-07 ENCOUNTER — TELEPHONE (OUTPATIENT)
Dept: INTERNAL MEDICINE | Facility: CLINIC | Age: 84
End: 2024-10-07
Payer: COMMERCIAL

## 2024-10-08 ENCOUNTER — TELEPHONE (OUTPATIENT)
Dept: INTERNAL MEDICINE | Facility: CLINIC | Age: 84
End: 2024-10-08
Payer: COMMERCIAL

## 2024-10-08 ENCOUNTER — MEDICAL CORRESPONDENCE (OUTPATIENT)
Dept: HEALTH INFORMATION MANAGEMENT | Facility: CLINIC | Age: 84
End: 2024-10-08

## 2024-10-08 DIAGNOSIS — Z53.9 DIAGNOSIS NOT YET DEFINED: Primary | ICD-10-CM

## 2024-10-08 PROCEDURE — G0180 MD CERTIFICATION HHA PATIENT: HCPCS | Performed by: INTERNAL MEDICINE

## 2024-10-14 ENCOUNTER — MEDICAL CORRESPONDENCE (OUTPATIENT)
Dept: HEALTH INFORMATION MANAGEMENT | Facility: CLINIC | Age: 84
End: 2024-10-14
Payer: COMMERCIAL

## 2024-10-15 ENCOUNTER — PATIENT OUTREACH (OUTPATIENT)
Dept: CARE COORDINATION | Facility: CLINIC | Age: 84
End: 2024-10-15
Payer: COMMERCIAL

## 2024-10-15 DIAGNOSIS — Z71.89 COORDINATION OF COMPLEX CARE: Primary | ICD-10-CM

## 2024-10-18 ENCOUNTER — ANCILLARY PROCEDURE (OUTPATIENT)
Dept: CARDIOLOGY | Facility: CLINIC | Age: 84
End: 2024-10-18
Attending: INTERNAL MEDICINE
Payer: COMMERCIAL

## 2024-10-18 ENCOUNTER — TELEPHONE (OUTPATIENT)
Dept: ONCOLOGY | Facility: CLINIC | Age: 84
End: 2024-10-18

## 2024-10-18 DIAGNOSIS — I48.91 ATRIAL FIBRILLATION (H): ICD-10-CM

## 2024-10-18 DIAGNOSIS — Z95.0 CARDIAC PACEMAKER IN SITU: ICD-10-CM

## 2024-10-18 LAB
MDC_IDC_EPISODE_DTM: NORMAL
MDC_IDC_EPISODE_DURATION: 0 S
MDC_IDC_EPISODE_DURATION: 0 S
MDC_IDC_EPISODE_DURATION: 1 S
MDC_IDC_EPISODE_DURATION: 2 S
MDC_IDC_EPISODE_ID: 33
MDC_IDC_EPISODE_ID: 34
MDC_IDC_EPISODE_ID: 35
MDC_IDC_EPISODE_ID: 36
MDC_IDC_EPISODE_ID: 37
MDC_IDC_EPISODE_ID: 38
MDC_IDC_EPISODE_ID: 39
MDC_IDC_EPISODE_ID: 40
MDC_IDC_EPISODE_ID: 41
MDC_IDC_EPISODE_ID: 42
MDC_IDC_EPISODE_ID: 43
MDC_IDC_EPISODE_ID: 44
MDC_IDC_EPISODE_ID: 45
MDC_IDC_EPISODE_ID: 46
MDC_IDC_EPISODE_ID: 47
MDC_IDC_EPISODE_TYPE: NORMAL
MDC_IDC_LEAD_CONNECTION_STATUS: NORMAL
MDC_IDC_LEAD_IMPLANT_DT: NORMAL
MDC_IDC_LEAD_LOCATION: NORMAL
MDC_IDC_LEAD_LOCATION_DETAIL_1: NORMAL
MDC_IDC_LEAD_MFG: NORMAL
MDC_IDC_LEAD_MODEL: NORMAL
MDC_IDC_LEAD_POLARITY_TYPE: NORMAL
MDC_IDC_LEAD_SERIAL: NORMAL
MDC_IDC_MSMT_BATTERY_DTM: NORMAL
MDC_IDC_MSMT_BATTERY_REMAINING_LONGEVITY: 167 MO
MDC_IDC_MSMT_BATTERY_RRT_TRIGGER: 2.62
MDC_IDC_MSMT_BATTERY_STATUS: NORMAL
MDC_IDC_MSMT_BATTERY_VOLTAGE: 3.17 V
MDC_IDC_MSMT_LEADCHNL_RV_IMPEDANCE_VALUE: 418 OHM
MDC_IDC_MSMT_LEADCHNL_RV_IMPEDANCE_VALUE: 627 OHM
MDC_IDC_MSMT_LEADCHNL_RV_PACING_THRESHOLD_AMPLITUDE: 0.62 V
MDC_IDC_MSMT_LEADCHNL_RV_PACING_THRESHOLD_PULSEWIDTH: 0.4 MS
MDC_IDC_MSMT_LEADCHNL_RV_SENSING_INTR_AMPL: 18.25 MV
MDC_IDC_MSMT_LEADCHNL_RV_SENSING_INTR_AMPL: 18.25 MV
MDC_IDC_PG_IMPLANT_DTM: NORMAL
MDC_IDC_PG_MFG: NORMAL
MDC_IDC_PG_MODEL: NORMAL
MDC_IDC_PG_SERIAL: NORMAL
MDC_IDC_PG_TYPE: NORMAL
MDC_IDC_SESS_CLINIC_NAME: NORMAL
MDC_IDC_SESS_DTM: NORMAL
MDC_IDC_SESS_TYPE: NORMAL
MDC_IDC_SET_BRADY_HYSTRATE: NORMAL
MDC_IDC_SET_BRADY_LOWRATE: 60 {BEATS}/MIN
MDC_IDC_SET_BRADY_MAX_SENSOR_RATE: 130 {BEATS}/MIN
MDC_IDC_SET_BRADY_MODE: NORMAL
MDC_IDC_SET_LEADCHNL_RV_PACING_AMPLITUDE: 2 V
MDC_IDC_SET_LEADCHNL_RV_PACING_ANODE_ELECTRODE_1: NORMAL
MDC_IDC_SET_LEADCHNL_RV_PACING_ANODE_LOCATION_1: NORMAL
MDC_IDC_SET_LEADCHNL_RV_PACING_CAPTURE_MODE: NORMAL
MDC_IDC_SET_LEADCHNL_RV_PACING_CATHODE_ELECTRODE_1: NORMAL
MDC_IDC_SET_LEADCHNL_RV_PACING_CATHODE_LOCATION_1: NORMAL
MDC_IDC_SET_LEADCHNL_RV_PACING_POLARITY: NORMAL
MDC_IDC_SET_LEADCHNL_RV_PACING_PULSEWIDTH: 0.4 MS
MDC_IDC_SET_LEADCHNL_RV_SENSING_ANODE_ELECTRODE_1: NORMAL
MDC_IDC_SET_LEADCHNL_RV_SENSING_ANODE_LOCATION_1: NORMAL
MDC_IDC_SET_LEADCHNL_RV_SENSING_CATHODE_ELECTRODE_1: NORMAL
MDC_IDC_SET_LEADCHNL_RV_SENSING_CATHODE_LOCATION_1: NORMAL
MDC_IDC_SET_LEADCHNL_RV_SENSING_POLARITY: NORMAL
MDC_IDC_SET_LEADCHNL_RV_SENSING_SENSITIVITY: 0.9 MV
MDC_IDC_SET_ZONE_DETECTION_INTERVAL: 400 MS
MDC_IDC_SET_ZONE_STATUS: NORMAL
MDC_IDC_SET_ZONE_TYPE: NORMAL
MDC_IDC_SET_ZONE_VENDOR_TYPE: NORMAL
MDC_IDC_STAT_BRADY_DTM_END: NORMAL
MDC_IDC_STAT_BRADY_DTM_START: NORMAL
MDC_IDC_STAT_BRADY_RV_PERCENT_PACED: 83.3 %
MDC_IDC_STAT_EPISODE_RECENT_COUNT: 0
MDC_IDC_STAT_EPISODE_RECENT_COUNT: 0
MDC_IDC_STAT_EPISODE_RECENT_COUNT: 29
MDC_IDC_STAT_EPISODE_RECENT_COUNT_DTM_END: NORMAL
MDC_IDC_STAT_EPISODE_RECENT_COUNT_DTM_START: NORMAL
MDC_IDC_STAT_EPISODE_TOTAL_COUNT: 0
MDC_IDC_STAT_EPISODE_TOTAL_COUNT: 0
MDC_IDC_STAT_EPISODE_TOTAL_COUNT: 47
MDC_IDC_STAT_EPISODE_TOTAL_COUNT_DTM_END: NORMAL
MDC_IDC_STAT_EPISODE_TOTAL_COUNT_DTM_START: NORMAL
MDC_IDC_STAT_EPISODE_TYPE: NORMAL

## 2024-10-18 PROCEDURE — 93296 REM INTERROG EVL PM/IDS: CPT | Performed by: INTERNAL MEDICINE

## 2024-10-18 PROCEDURE — 93294 REM INTERROG EVL PM/LDLS PM: CPT | Performed by: INTERNAL MEDICINE

## 2024-10-18 NOTE — CONFIDENTIAL NOTE
Called pt to assess for symptoms.    Pt stated that she was primarily calling to address a medication question. She is wondering if she can be on aspirin until January when she will be able to pay for Eliquis due to insurance reasons. She also wonders if she can not be on aspirin until January if Dr. Pompa would want her to be on Warfarin until January when she can switch to Eliquis.    Pt had thought her apt with Dr. Pompa was today and was concerned because she has a migraine which she took her Fiorinal for but it makes her sleepy. She does not have any current symptom concerns, but did not want to be sleepy for her apt. She is aware her apt is next week on Monday and will address medication questions at that time.    Will send to care team to update.

## 2024-10-18 NOTE — TELEPHONE ENCOUNTER
----- Message from Nafisa ADAMS sent at 10/18/2024 11:46 AM CDT -----  Regarding: Please call to assess  Hi,    Please call patient to assess, symptomatic VM left on my line (headache/migraine, not sure if really appropriate for us, or should go to PCP).    ThanksNafisa

## 2024-10-21 ENCOUNTER — ONCOLOGY VISIT (OUTPATIENT)
Dept: ONCOLOGY | Facility: CLINIC | Age: 84
End: 2024-10-21
Attending: INTERNAL MEDICINE
Payer: COMMERCIAL

## 2024-10-21 VITALS
TEMPERATURE: 98.1 F | DIASTOLIC BLOOD PRESSURE: 64 MMHG | HEIGHT: 68 IN | HEART RATE: 80 BPM | RESPIRATION RATE: 18 BRPM | SYSTOLIC BLOOD PRESSURE: 158 MMHG | OXYGEN SATURATION: 97 % | BODY MASS INDEX: 36.83 KG/M2 | WEIGHT: 243 LBS

## 2024-10-21 DIAGNOSIS — I48.20 CHRONIC ATRIAL FIBRILLATION (H): ICD-10-CM

## 2024-10-21 DIAGNOSIS — D69.3 IDIOPATHIC THROMBOCYTOPENIC PURPURA (H): Primary | ICD-10-CM

## 2024-10-21 DIAGNOSIS — D69.3 IDIOPATHIC THROMBOCYTOPENIC PURPURA (H): ICD-10-CM

## 2024-10-21 LAB
BASOPHILS # BLD AUTO: 0.1 10E3/UL (ref 0–0.2)
BASOPHILS NFR BLD AUTO: 1 %
EOSINOPHIL # BLD AUTO: 0.3 10E3/UL (ref 0–0.7)
EOSINOPHIL NFR BLD AUTO: 4 %
ERYTHROCYTE [DISTWIDTH] IN BLOOD BY AUTOMATED COUNT: 13.4 % (ref 10–15)
HCT VFR BLD AUTO: 39.4 % (ref 35–47)
HGB BLD-MCNC: 13.6 G/DL (ref 11.7–15.7)
IMM GRANULOCYTES # BLD: 0 10E3/UL
IMM GRANULOCYTES NFR BLD: 0 %
LYMPHOCYTES # BLD AUTO: 1.6 10E3/UL (ref 0.8–5.3)
LYMPHOCYTES NFR BLD AUTO: 28 %
MCH RBC QN AUTO: 31.3 PG (ref 26.5–33)
MCHC RBC AUTO-ENTMCNC: 34.5 G/DL (ref 31.5–36.5)
MCV RBC AUTO: 91 FL (ref 78–100)
MONOCYTES # BLD AUTO: 0.8 10E3/UL (ref 0–1.3)
MONOCYTES NFR BLD AUTO: 14 %
NEUTROPHILS # BLD AUTO: 3 10E3/UL (ref 1.6–8.3)
NEUTROPHILS NFR BLD AUTO: 53 %
NRBC # BLD AUTO: 0 10E3/UL
NRBC BLD AUTO-RTO: 0 /100
PLATELET # BLD AUTO: 89 10E3/UL (ref 150–450)
RBC # BLD AUTO: 4.35 10E6/UL (ref 3.8–5.2)
WBC # BLD AUTO: 5.7 10E3/UL (ref 4–11)

## 2024-10-21 PROCEDURE — 85025 COMPLETE CBC W/AUTO DIFF WBC: CPT | Performed by: PHYSICIAN ASSISTANT

## 2024-10-21 PROCEDURE — 36415 COLL VENOUS BLD VENIPUNCTURE: CPT

## 2024-10-21 PROCEDURE — G0463 HOSPITAL OUTPT CLINIC VISIT: HCPCS | Performed by: INTERNAL MEDICINE

## 2024-10-21 PROCEDURE — 99214 OFFICE O/P EST MOD 30 MIN: CPT | Performed by: INTERNAL MEDICINE

## 2024-10-21 RX ORDER — ASPIRIN 81 MG/1
81 TABLET ORAL DAILY
COMMUNITY

## 2024-10-21 ASSESSMENT — PAIN SCALES - GENERAL: PAINLEVEL: NO PAIN (0)

## 2024-10-21 NOTE — NURSING NOTE
"Oncology Rooming Note    October 21, 2024 1:26 PM   Savanna Rehman is a 83 year old female who presents for:    Chief Complaint   Patient presents with    Oncology Clinic Visit     Initial Vitals: BP (!) 158/64 (BP Location: Other (Comment))   Pulse 80   Temp 98.1  F (36.7  C) (Temporal)   Resp 18   Ht 1.727 m (5' 7.99\")   Wt 110.2 kg (243 lb)   LMP  (LMP Unknown)   SpO2 97%   BMI 36.96 kg/m   Estimated body mass index is 36.96 kg/m  as calculated from the following:    Height as of this encounter: 1.727 m (5' 7.99\").    Weight as of this encounter: 110.2 kg (243 lb). Body surface area is 2.3 meters squared.  No Pain (0) Comment: Data Unavailable   No LMP recorded (lmp unknown). Patient is postmenopausal.  Allergies reviewed: Yes  Medications reviewed: Yes    Medications: Medication refills not needed today.  Pharmacy name entered into [facility] - [address]    Frailty Screening:   Is the patient here for a new oncology consult visit in cancer care? 2. No      Clinical concerns: f/u       Ifeoma Mcclellan CMA              "

## 2024-10-21 NOTE — LETTER
10/21/2024      Savanna Rehman  [address]  [place] [address]      Dear Colleague,    Thank you for referring your patient, Savanna Rehman, to the [facility]. Please see a copy of my visit note below.    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 Cost was prohibitive, plan to switch to eliquis in January 2025. Patient and her daughter will ask their pharmacist and evaluate for her healthcare plan. Currently on Asa 81 mg daily.  LE edema, pitting- compression stockings and follow up with PCP currently on lasix but believes she is only on 20 mg    Problem List:  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 PLC 152K 9/5/2024--> 70K on 9/13/2024 and 59K on 9/16/2024. CT imaging without malignancy however cirrhosis and splenomegaly plus diverticulosis  Atrial fibrillation on chronic anticoagulation.  Cirrhosis.  Congestive heart failure.  Coronary artery disease.  Diabetes mellitus type 2.  Fibromyalgia.  GERD.  Gout.  Sleep apnea.       History of present illness: Savanna Rehman is a 83 year old patient admitted to [facility] for PLT count of 2K presented with petechia. NO precipitating factors. CT imaging without malignancy however cirrhosis and splenomegaly plus diverticulosis noted. Baseline plt ~100K with underlying cirrhosis. Given abrupt drop presume cause of  acute thrombocytopenia is ITP. Reports recent initiation of Protonix. S/p IVIG 8/31/24 and 9/1/24 S/p Dex 40mg x 4 days (8/31-9/3/24). PLT improved. Switched to Eliquis from coumadin.     Interim history  She was discharged to the TCU In September and her plts have been slowly uptrending. She denied any bleeding- she is back home- daughter not present during the visit. PLC at 89K on 10/21 she currently is on asa 81 mg daily. She reports a bruise from trauma to her arm. Reports +2 LE edema. On lasix but doesn't recall the dose.     Review of Systems: See interval hx. Denies fevers, chills, HA, dizziness, n/t, changes in vision, cough, sore throat, CP, SOB, abdominal pain, N/V, diarrhea, changes in urination, bleeding, bruising, rash.     PMHx and Social Hx reviewed per EPIC.    Medications:  Current Outpatient Medications   Medication Sig Dispense Refill     aspirin 81 MG EC tablet Take 81 mg by mouth daily.       cholecalciferol (VITAMIN D3) 10 mcg (400 units) TABS tablet Take 10 mcg by mouth daily.       EPINEPHrine (ANY BX GENERIC EQUIV) 0.3 MG/0.3ML injection 2-pack Inject 0.3 mLs (0.3 mg) into the muscle as needed for anaphylaxis May repeat one time in 5-15 minutes if response to initial dose is inadequate. 2 each 3     escitalopram (LEXAPRO) 10 MG tablet Take 1 tablet (10 mg) by mouth at bedtime 90 tablet 1     famotidine (PEPCID) 10 MG tablet Take 1 tablet (10 mg) by mouth 2 times daily as needed (Heart burn). 60 tablet 0     furosemide (LASIX) 40 MG tablet Take 1 tablet by mouth once daily 90 tablet 1     glipiZIDE (GLUCOTROL XL) 2.5 MG 24 hr tablet Take 1 tablet by mouth twice daily 180 tablet 3     loperamide (IMODIUM) 2 MG capsule TAKE 1 CAPSULE BY MOUTH 4 TIMES DAILY AS NEEDED FOR DIARRHEA 60 capsule 0     apixaban ANTICOAGULANT (ELIQUIS) 2.5 MG tablet Take 1 tablet (2.5 mg) by mouth 2 times daily. (Patient not taking: Reported on 9/30/2024) 60 tablet 0     melatonin 3 MG tablet Take 3 mg by mouth nightly  "as needed for sleep. (Patient not taking: Reported on 9/30/2024)         Allergies   Allergen Reactions     Augmented Betamethasone Diprop [Betamethasone] Other (See Comments)     Severe yeast infection     Petrolatum Anaphylaxis and Swelling     Rash and swelling     Shellfish-Derived Products Anaphylaxis     Tongue swelling     Aspirin Swelling     tongue swelling     Bacitracin      Rash swelling     Bactrim [Sulfamethoxazole-Trimethoprim] Dizziness     Darvon [Propoxyphene] Swelling     Throat closes     Dilaudid [Hydromorphone]      No side effects from fentanyl June 2023     Levaquin [Levofloxacin] Swelling     Tongue swelling     Lidocaine      Facial swelling      Morphine Unknown     Per [facility] 2/23/24     Neomycin Swelling     rash     Neosporin [Neomycin-Polymyxin-Gramicidin] Swelling     rash     Nitrofurantoin      SOB, GI upset,     Oxycodone      Severe itching     Percocet [Oxycodone-Acetaminophen] Unknown     Percodan [Oxycodone-Aspirin]      Severe itching     Polymyxin B      Pramoxine      Tramadol      Vicodin [Hydrocodone-Acetaminophen]      Severe itching       Xarelto [Rivaroxaban]      Adhesive Tape Rash     Band aids      Codeine Rash     Hydrocortisone Rash and Swelling     Other Environmental Allergy Rash     Adhesive tape   Band aids          EXAM:    BP (!) 158/64 (BP Location: Other (Comment))   Pulse 80   Temp 98.1  F (36.7  C) (Temporal)   Resp 18   Ht 1.727 m (5' 7.99\")   Wt 110.2 kg (243 lb)   LMP  (LMP Unknown)   SpO2 97%   BMI 36.96 kg/m      GENERAL:  female, in no acute distress.  Alert and oriented x3.   HEENT:  Normocephalic, atraumatic.  PERRL, oropharynx clear with no sores or thrush.   LYMPH NODES:  No visible cervical, axillary lymphadenopathy appreciated.  LUNGS:  no wheezing  ABDOMEN:  Soft, nontender and nondistended.  Bowel sounds heard x4.  No apparent hepatosplenomegaly.   PSYCH: Calm and cooperative       Labs: CBC RESULTS:   Recent Labs   Lab " Test 09/16/24  0601   WBC 4.5   RBC 4.20   HGB 13.0   HCT 39.5   MCV 94   MCH 31.0   MCHC 32.9   RDW 14.3   PLT 59*        30 minutes spent on the date of the encounter doing chart review, review of test results, interpretation of tests, patient visit, and documentation         Patsy Pompa  Hematology/Oncology  [facility]                 Again, thank you for allowing me to participate in the care of your patient.        Sincerely,        Patsy Pompa MD

## 2024-10-21 NOTE — PROGRESS NOTES
Medical Assistant Note:  Savanna Rehman presents today for blood draw.    Patient seen by provider today: Yes: Dr. Pompa.   present during visit today: Not Applicable.    Concerns: No Concerns.    Procedure:  Lab draw site: right antecub, Needle type: butterfly, Gauge: 23.    Post Assessment:  Labs drawn without difficulty: Yes.    Discharge Plan:  Departure Mode: wheeled walker.    Face to Face Time: 10 minutes.    Kusum Tracey MA

## 2024-10-21 NOTE — PROGRESS NOTES
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 Cost was prohibitive, plan to switch to eliquis in January 2025. Patient and her daughter will ask their pharmacist and evaluate for her healthcare plan. Currently on Asa 81 mg daily.  LE edema, pitting- compression stockings and follow up with PCP currently on lasix but believes she is only on 20 mg    Problem List:  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 PLC 152K 9/5/2024--> 70K on 9/13/2024 and 59K on 9/16/2024. CT imaging without malignancy however cirrhosis and splenomegaly plus diverticulosis  Atrial fibrillation on chronic anticoagulation.  Cirrhosis.  Congestive heart failure.  Coronary artery disease.  Diabetes mellitus type 2.  Fibromyalgia.  GERD.  Gout.  Sleep apnea.       History of present illness: Savanna Rehman is a 83 year old patient admitted to [facility] for PLT count of 2K presented with petechia. NO precipitating factors. CT imaging without malignancy however cirrhosis and splenomegaly plus diverticulosis noted. Baseline plt ~100K with underlying cirrhosis. Given abrupt drop presume cause of acute thrombocytopenia is ITP. Reports recent initiation of Protonix. S/p IVIG 8/31/24 and 9/1/24 S/p Dex 40mg x 4 days (8/31-9/3/24). PLT improved. Switched to Eliquis from coumadin.     Interim history  She was discharged to the TCU In September and her plts have  been slowly uptrending. She denied any bleeding- she is back home- daughter not present during the visit. PLC at 89K on 10/21 she currently is on asa 81 mg daily. She reports a bruise from trauma to her arm. Reports +2 LE edema. On lasix but doesn't recall the dose.     Review of Systems: See interval hx. Denies fevers, chills, HA, dizziness, n/t, changes in vision, cough, sore throat, CP, SOB, abdominal pain, N/V, diarrhea, changes in urination, bleeding, bruising, rash.     PMHx and Social Hx reviewed per EPIC.    Medications:  Current Outpatient Medications   Medication Sig Dispense Refill    aspirin 81 MG EC tablet Take 81 mg by mouth daily.      cholecalciferol (VITAMIN D3) 10 mcg (400 units) TABS tablet Take 10 mcg by mouth daily.      EPINEPHrine (ANY BX GENERIC EQUIV) 0.3 MG/0.3ML injection 2-pack Inject 0.3 mLs (0.3 mg) into the muscle as needed for anaphylaxis May repeat one time in 5-15 minutes if response to initial dose is inadequate. 2 each 3    escitalopram (LEXAPRO) 10 MG tablet Take 1 tablet (10 mg) by mouth at bedtime 90 tablet 1    famotidine (PEPCID) 10 MG tablet Take 1 tablet (10 mg) by mouth 2 times daily as needed (Heart burn). 60 tablet 0    furosemide (LASIX) 40 MG tablet Take 1 tablet by mouth once daily 90 tablet 1    glipiZIDE (GLUCOTROL XL) 2.5 MG 24 hr tablet Take 1 tablet by mouth twice daily 180 tablet 3    loperamide (IMODIUM) 2 MG capsule TAKE 1 CAPSULE BY MOUTH 4 TIMES DAILY AS NEEDED FOR DIARRHEA 60 capsule 0    apixaban ANTICOAGULANT (ELIQUIS) 2.5 MG tablet Take 1 tablet (2.5 mg) by mouth 2 times daily. (Patient not taking: Reported on 9/30/2024) 60 tablet 0    melatonin 3 MG tablet Take 3 mg by mouth nightly as needed for sleep. (Patient not taking: Reported on 9/30/2024)         Allergies   Allergen Reactions    Augmented Betamethasone Diprop [Betamethasone] Other (See Comments)     Severe yeast infection    Petrolatum Anaphylaxis and Swelling     Rash and swelling     "Shellfish-Derived Products Anaphylaxis     Tongue swelling    Aspirin Swelling     tongue swelling    Bacitracin      Rash swelling    Bactrim [Sulfamethoxazole-Trimethoprim] Dizziness    Darvon [Propoxyphene] Swelling     Throat closes    Dilaudid [Hydromorphone]      No side effects from fentanyl June 2023    Levaquin [Levofloxacin] Swelling     Tongue swelling    Lidocaine      Facial swelling     Morphine Unknown     [facility] 2/23[facility]24    Neomycin Swelling     rash    Neosporin [Neomycin-Polymyxin-Gramicidin] Swelling     rash    Nitrofurantoin      SOB, GI upset,    Oxycodone      Severe itching    Percocet [Oxycodone-Acetaminophen] Unknown    Percodan [Oxycodone-Aspirin]      Severe itching    Polymyxin B     Pramoxine     Tramadol     Vicodin [Hydrocodone-Acetaminophen]      Severe itching      Xarelto [Rivaroxaban]     Adhesive Tape Rash     Band aids     Codeine Rash    Hydrocortisone Rash and Swelling    Other Environmental Allergy Rash     Adhesive tape   Band aids          EXAM:    BP (!) 158/64 (BP Location: Other (Comment))   Pulse 80   Temp 98.1  F (36.7  C) (Temporal)   Resp 18   Ht 1.727 m (5' 7.99\")   Wt 110.2 kg (243 lb)   LMP  (LMP Unknown)   SpO2 97%   BMI 36.96 kg/m      GENERAL:  female, in no acute distress.  Alert and oriented x3.   HEENT:  Normocephalic, atraumatic.  PERRL, oropharynx clear with no sores or thrush.   LYMPH NODES:  No visible cervical, axillary lymphadenopathy appreciated.  LUNGS:  no wheezing  ABDOMEN:  Soft, nontender and nondistended.  Bowel sounds heard x4.  No apparent hepatosplenomegaly.   PSYCH: Calm and cooperative       Labs: CBC RESULTS:   Recent Labs   Lab Test 09/16/24  0601   WBC 4.5   RBC 4.20   HGB 13.0   HCT 39.5   MCV 94   MCH 31.0   MCHC 32.9   RDW 14.3   PLT 59*        30 minutes spent on the date of the encounter doing chart review, review of test results, interpretation of tests, patient visit, and documentation         Faysal " Aletha  Hematology/Oncology  [facility]

## 2024-10-23 ENCOUNTER — NURSE TRIAGE (OUTPATIENT)
Dept: INTERNAL MEDICINE | Facility: CLINIC | Age: 84
End: 2024-10-23
Payer: COMMERCIAL

## 2024-10-23 NOTE — TELEPHONE ENCOUNTER
Appointments in Next Year      Oct 24, 2024 11:30 AM  (Arrive by 11:10 AM)  Provider Visit with Taylor Payan MD  [facility] ([facility] ) [phone]       Reason for Disposition   MILD swelling of both ankles (i.e., pedal edema) AND new-onset or worsening    Additional Information   Negative: Sounds like a life-threatening emergency to the triager   Negative: Difficulty breathing at rest   Negative: Entire foot is cool or blue in comparison to other side   Negative: SEVERE swelling (e.g., swelling extends above knee, entire leg is swollen, weeping fluid)   Negative: Cast on leg or ankle and has increasing pain   Negative: Can't walk or can barely stand (new-onset)   Negative: Fever and red area (or area very tender to touch)   Negative: Patient sounds very sick or weak to the triager   Negative: Swelling of face, arm or hands  (Exception: Slight puffiness of fingers during hot weather.)   Negative: Pregnant 20 or more weeks and sudden weight gain (i.e., > 2 lbs, 1 kg in one week)   Negative: Thigh or calf pain and only 1 side and present > 1 hour   Negative: Thigh, calf, or ankle swelling in only one leg   Negative: Thigh, calf, or ankle swelling in both legs, but one side is definitely more swollen (Exception: Longstanding difference between legs.)   Negative: MODERATE swelling of both ankles (e.g., swelling extends up to the knees) AND new-onset or worsening   Negative: Difficulty breathing with exertion AND worsening or new-onset   Negative: Looks like a boil, infected sore, deep ulcer, or other infected rash (spreading redness, pus)   Negative: Patient wants to be seen    Protocols used: Leg Swelling and Edema-A-OH

## 2024-10-24 ENCOUNTER — OFFICE VISIT (OUTPATIENT)
Dept: INTERNAL MEDICINE | Facility: CLINIC | Age: 84
End: 2024-10-24
Payer: COMMERCIAL

## 2024-10-24 VITALS
RESPIRATION RATE: 16 BRPM | SYSTOLIC BLOOD PRESSURE: 133 MMHG | HEART RATE: 84 BPM | OXYGEN SATURATION: 97 % | DIASTOLIC BLOOD PRESSURE: 72 MMHG | WEIGHT: 246.6 LBS | HEIGHT: 68 IN | BODY MASS INDEX: 37.38 KG/M2 | TEMPERATURE: 98.1 F

## 2024-10-24 DIAGNOSIS — R60.0 BILATERAL LOWER EXTREMITY EDEMA: Primary | ICD-10-CM

## 2024-10-24 DIAGNOSIS — N18.32 STAGE 3B CHRONIC KIDNEY DISEASE (H): ICD-10-CM

## 2024-10-24 PROCEDURE — 99214 OFFICE O/P EST MOD 30 MIN: CPT

## 2024-10-24 RX ORDER — FUROSEMIDE 20 MG/1
20 TABLET ORAL DAILY
Qty: 4 TABLET | Refills: 0 | Status: SHIPPED | OUTPATIENT
Start: 2024-10-24

## 2024-10-24 NOTE — PATIENT INSTRUCTIONS
Take the extra furosemide at 2:00pm for the next 4 days. Elevate your legs and TG shapes that PT will fit for you

## 2024-10-24 NOTE — PROGRESS NOTES
"  Assessment & Plan     (R60.0) Bilateral lower extremity edema  (primary encounter diagnosis)  Comment: Pt has bilateral lower extremity edema and is needing compression stockings.  Patient reports that she struggles with putting on LIBORIO hose.  I placed a referral to ENT to customized TG shapes for her results and easier for her to slide on and off.  Added an extra 20 mg dose of furosemide for 4 days to be taken at 2:00 in the afternoon to help reduce the swelling.  Instructed patient to elevate legs is much as possible  Plan: Physical Therapy  Referral, furosemide        (LASIX) 20 MG tablet        Referral sent, prescription sent to pharmacy    (N18.32) Stage 3b chronic kidney disease (H)  Comment: BMP on 9/13/2024 shows a potassium of 4.2, GFR of 54, needs to be rechecked due to increased dose of furosemide x 4 days  Plan: Basic metabolic panel  (Ca, Cl, CO2, Creat,         Gluc, K, Na, BUN)        Results pending          BMI  Estimated body mass index is 37.51 kg/m  as calculated from the following:    Height as of this encounter: 1.727 m (5' 7.99\").    Weight as of this encounter: 111.9 kg (246 lb 9.6 oz).             Mamadou Corrales is a 83 year old, presenting for the following health issues: Pt reports irritation to her left eye and feels swollen. Pt states that she doesn't wake with her eyes matted shut with drainage. Pt denies any fevers night sweats or chills.  Explained patient to apply a warm moist cloth to her eye to promote healing and subsequent drainage to the site.  Explained that it may take up to 2 weeks for her to completely heal.  Provided the patient with literature about styes and chalazia/hordeolum's so she could be informed on how to manage the discomfort at home.    Pt reports that she has not been taking the apixaban due to insurance reasons and coverage cost. Dr. Pompa from hematology stated: Anticoagulation for Afib,I had recommended Eliquis to 2.5 mg po bid given her " "age, low plts and borderline low EGFR. Cost was prohibitive, plan to switch to eliquis in January 2025. Patient and her daughter will ask their pharmacist and evaluate for her healthcare plan. Currently on Asa 81 mg daily.     Pt has bilateral lower extremity edema and is needing compression stockings.  Patient reports that she struggles with putting on LIBORIO hose.  I placed a referral to ENT to customized TG shapes for her results and easier for her to slide on and off.  Added an extra 20 mg dose of furosemide for 4 days to be taken at 2:00 in the afternoon to help reduce the swelling.  Instructed patient to elevate legs is much as possible  Leg Swelling (Both legs are swelling.) and Eye Problem (Eyes are swelling. It may be a sty.)      10/24/2024     4:30 PM   Additional Questions   Roomed by Libertad Harp MA   Accompanied by Self     HPI               Review of Systems  Constitutional, HEENT, cardiovascular, pulmonary, gi and gu systems are negative, except as otherwise noted.      Objective    /72 (BP Location: Left arm, Patient Position: Sitting, Cuff Size: Adult Regular)   Pulse 84   Temp 98.1  F (36.7  C) (Tympanic)   Resp 16   Ht 1.727 m (5' 7.99\")   Wt 111.9 kg (246 lb 9.6 oz)   LMP  (LMP Unknown)   SpO2 97%   BMI 37.51 kg/m    Body mass index is 37.51 kg/m .  Physical Exam   GENERAL: alert and no distress  EYES: Eyes grossly normal to inspection and conjunctiva on the left looks somewhat irritated  NECK: no adenopathy, no asymmetry, masses, or scars  RESP: lungs clear to auscultation - no rales, rhonchi or wheezes  CV: regular rates and rhythm, normal S1 S2, no S3 or S4, no murmur, click or rub, peripheral pulses strong, and 2+ bilateral lower extremity pitting edema to 1/2 up to the knees    ABDOMEN: soft, nontender, no hepatosplenomegaly, no masses and bowel sounds normal  MS: no gross musculoskeletal defects noted, no edema  SKIN: no suspicious lesions or rashes  NEURO: Normal strength and " tone, mentation intact and speech normal  PSYCH: mentation appears normal, affect normal/bright            Signed Electronically by: BATSHEVA Natarajan CNP

## 2024-10-30 ENCOUNTER — TELEPHONE (OUTPATIENT)
Dept: ONCOLOGY | Facility: CLINIC | Age: 84
End: 2024-10-30
Payer: COMMERCIAL

## 2024-10-30 NOTE — TELEPHONE ENCOUNTER
Pt is calling.   States that she met with Dr Pompa last week and they discussed possibly switching to eliquis in January 2025.  Pt states that she has many more questions about the eliquis, costs involved, and her insurance plan.  Pt is requesting to talk to someone about all of this.    Will route to [facility] for follow up.  La Jackson RN on 10/30/2024 at 4:04 PM

## 2024-10-31 ENCOUNTER — PATIENT OUTREACH (OUTPATIENT)
Dept: CARE COORDINATION | Facility: CLINIC | Age: 84
End: 2024-10-31
Payer: COMMERCIAL

## 2024-11-04 ENCOUNTER — TELEPHONE (OUTPATIENT)
Dept: INTERNAL MEDICINE | Facility: CLINIC | Age: 84
End: 2024-11-04
Payer: COMMERCIAL

## 2024-11-04 NOTE — TELEPHONE ENCOUNTER
Forms/Letter Request    Type of form/letter: Missed SN visit 10/6/24 through 10/12/24      Do we have the form/letter: Yes: placed in provider mailbox for signature    Who is the form from? [facility]    Where did/will the form come from? form was faxed in    When is form/letter needed by: 5-7    How would you like the form/letter returned: Fax : [phone]    Patient Notified form requests are processed in 5-7 business days:Yes    Could we send this information to you in [employer] or would you prefer to receive a phone call?:   NA

## 2024-11-06 ENCOUNTER — TELEPHONE (OUTPATIENT)
Dept: INTERNAL MEDICINE | Facility: CLINIC | Age: 84
End: 2024-11-06
Payer: COMMERCIAL

## 2024-11-06 NOTE — TELEPHONE ENCOUNTER
FYI - Status Update    Who is Calling: patient    Update: patient called 11/6/24 she wanted to know who her PCP would recommend for a new GI doctor within the [facility].     Does caller want a call/response back: Yes     Could we send this information to you in [employer] or would you prefer to receive a phone call?:   Patient would prefer a phone call   Okay to leave a detailed message?: Yes at Cell number on file:    Telephone Information:   Mobile [phone]

## 2024-11-07 NOTE — TELEPHONE ENCOUNTER
Writer called patient to check in with her and find out what questions she has.  Pt did not answer.  Left voicemail for patient letting her know we were calling her back to review her questions and for her to return our call at her convenience.  Awaiting return call.    Vani Li, RN, BSN    RN Care Coordinator  [facility]  [phone]

## 2024-11-07 NOTE — TELEPHONE ENCOUNTER
Provider's don't usually give specific names, but will refer to Dr. Payan to advise if she recommends any specific GI doctor.    Thank you,  Dawood Ortez, Triage RN [facility] [place]  5:24 PM 11/7/2024

## 2024-11-08 ENCOUNTER — TELEPHONE (OUTPATIENT)
Dept: INTERNAL MEDICINE | Facility: CLINIC | Age: 84
End: 2024-11-08

## 2024-11-08 ENCOUNTER — HOSPITAL ENCOUNTER (OUTPATIENT)
Dept: ULTRASOUND IMAGING | Facility: CLINIC | Age: 84
Discharge: HOME | End: 2024-11-08
Attending: INTERNAL MEDICINE | Admitting: INTERNAL MEDICINE
Payer: COMMERCIAL

## 2024-11-08 DIAGNOSIS — K75.81 LIVER CIRRHOSIS SECONDARY TO NASH (NONALCOHOLIC STEATOHEPATITIS) (H): ICD-10-CM

## 2024-11-08 DIAGNOSIS — K74.60 LIVER CIRRHOSIS SECONDARY TO NASH (NONALCOHOLIC STEATOHEPATITIS) (H): ICD-10-CM

## 2024-11-08 PROCEDURE — 76705 ECHO EXAM OF ABDOMEN: CPT

## 2024-11-08 NOTE — TELEPHONE ENCOUNTER
Test Results        Who ordered the test:  Schuyler Vazquez    Type of test: Lab and MRI    Date of test:  11/8/24    Where was the test performed:  [facility]    What are your questions/concerns?:  patient wanted to go over the MRI results, she said the MRI was painful today.    Could we send this information to you in [facility] or would you prefer to receive a phone call?:   Patient would prefer a phone call   Okay to leave a detailed message?: Yes at Cell number on file:    Telephone Information:   Mobile [phone]

## 2024-11-11 NOTE — TELEPHONE ENCOUNTER
Patient calling about her results for US that she completed on 11/8/24. Results have not been signed. Writer advised that results were ordered by GI doctor so likely will need to follow up with GI doctor. Patient reports that she is not continuing care with Dr. Dia due to previous conflict and would like PCP's input.     See telephone encounter from 11/6/24 patient sent reqesting GI doc recommendations.     Summer RN 8:07 AM November 11, 2024   [facility]

## 2024-11-12 NOTE — TELEPHONE ENCOUNTER
Spoke with pt, she is no longer wanting a GI referral. She is going to her former GI, 's office and is okay with that. Her next appt with them is 11/15.

## 2024-11-13 DIAGNOSIS — I48.91 ATRIAL FIBRILLATION (H): Primary | ICD-10-CM

## 2024-11-13 NOTE — TELEPHONE ENCOUNTER
Transmission received. HR's stable .  Leads and battery stable.  Medications:        Pt states she has had dizziness that started last Friday 11/8/2024  13 EGM's since last Friday show intermittent RVR with Rates 150-160's. No onset or offset available so unclear how long these episodes really are.    Called and spoke with pt. Pt states she had been on Warfarin however had been stopped due to severe bruising. After Jan 1 st she was going to start Eliquis due to $$. Has been taking a baby aspirin.    Will forward to team for review and recommendations.      Jude HORTON

## 2024-11-13 NOTE — TELEPHONE ENCOUNTER
"Received VM from patient who was clearly frustrated as she was transferred several time with in the clinic and it has taken her 45 mins to reach this VM  Pt states she has been \"extremely dizzy\" and \"needs help trying to figure out what is going on\"    Called pt back with in 10 mins of receiving her call: Pt states she has felt \" very crummy since last Friday\"  States she has been so dizzy.     Pt has a [employer] Tegan PPM placed 4/5/2024  Asked pt to send me a transmission but is having trouble with this. Message sent to Device techs to call pt and trouble shoot the issue    Also asked pt if she has checked her B/P  150/95   HR:  97    Pt states she has been taking her Lasix as prescribed every day.    Asked pt if she needed to go to the ED and she said no. Pt states the dizziness comes and goes. Recommended pt go to the ED if necessary or see if someone can come stay with her. Pt verb understanding.    Awaiting transmission  Jude HORTON    "

## 2024-11-14 ENCOUNTER — TRANSFERRED RECORDS (OUTPATIENT)
Dept: HEALTH INFORMATION MANAGEMENT | Facility: CLINIC | Age: 84
End: 2024-11-14
Payer: COMMERCIAL

## 2024-11-15 ENCOUNTER — TRANSFERRED RECORDS (OUTPATIENT)
Dept: HEALTH INFORMATION MANAGEMENT | Facility: CLINIC | Age: 84
End: 2024-11-15
Payer: COMMERCIAL

## 2024-11-15 NOTE — TELEPHONE ENCOUNTER
Call to patient advised. Patient states she would like to see Dr. Payan but will need to call back to schedule.

## 2024-11-15 NOTE — TELEPHONE ENCOUNTER
US results does not show any masses or significant changes. To follow up with GI team/ordering team for next steps/recommendations.

## 2024-11-19 ENCOUNTER — TELEPHONE (OUTPATIENT)
Dept: ONCOLOGY | Facility: CLINIC | Age: 84
End: 2024-11-19
Payer: COMMERCIAL

## 2024-11-19 NOTE — CONFIDENTIAL NOTE
Savanna calling to confirm that her lab results that were faxed from her GI provider to the hematology clinic were received.    She reports that her platelets were 118 on those lab results. She is concerned because she has noticed some bruising in areas where she has bumped her skin. She would like to know if Dr. Pompa thinks this is concerning. She is currently take ASA 81 mg daily.

## 2024-11-19 NOTE — CONFIDENTIAL NOTE
Called pt to notify her that lab results have not yet been received by care team.    No answer, left VM with clinic fax number and request to have lab results sent over.

## 2024-11-19 NOTE — TELEPHONE ENCOUNTER
No answer from team at this time.  Called and left VM for pt to call back with update.           1.Will have her send a remote to day to see if she is still in Afib.            2.Reassess symptoms.  Will forward to team again once we determine her symptoms and rhythm.  Jude HORTON

## 2024-11-19 NOTE — TELEPHONE ENCOUNTER
Aniyah Waller DO Friedlund, Gwen D, RN; P [facility] Heart Device Nurse  Cc: Veronica Dominguez RN  Caller: Unspecified (6 days ago,  2:42 PM)  This is Dr. Kenny's patient not mine. Pt was put on my schedule in follow-up. I would recommend starting metoprolol 25mg daily and either restart warfarin or ELiquis depending on her preference. Sounds like she needs an [facility] visit to discuss these meds.    Message left to return call to RN at [phone] to further discuss.  Veronica Dominguez, CLIFFORD on 11/19/2024 at 12:53 PM

## 2024-11-19 NOTE — CONFIDENTIAL NOTE
Pt calling with an additional question.    She would like to know if she still will need to have labs done on 12/2, or if the labs that were sent over by her GI team will suffice for her apt on 12/2.    Will send to provider for advisement.

## 2024-11-20 NOTE — TELEPHONE ENCOUNTER
Patsy Pompa MD  You; Nafisa Fernandez, RN; Sheree Herrera RN3 minutes ago (11:22 AM)     FH  Those labs are sufficient. She needs the Asa for the afib. The Bruising is likely from the Asa. Plt count seems to have improved. I will see her on 12/2 and no need for repeat labs before the visit.     Pt was notified with recommendations per Dr Pompa.  Pt states that she is very happy that she does not  need to have labs drawn again. Lab appt has been cancelled. She will follow up with Dr Pompa on 12/2/2024 as scheduled.  She will continue to monitor for bleeding.  Patient verbalized understanding and agreement with plan.  Patient was instructed to call the clinic with any questions, concerns, or worsening symptoms.  La Jackson RN on 11/20/2024 at 11:37 AM

## 2024-11-22 ENCOUNTER — MEDICAL CORRESPONDENCE (OUTPATIENT)
Dept: HEALTH INFORMATION MANAGEMENT | Facility: CLINIC | Age: 84
End: 2024-11-22

## 2024-11-22 NOTE — TELEPHONE ENCOUNTER
Called pt to discuss Dr Yanes recommendations.  VM left with Device number [phone]    Please review recommendations.  Metoprolol 25 mg daily  Eliquis or Warfarin  ?  pt preference?   OV with [facility]/ Dr Kenny to discuss meds  Still symptomatic?    Jude HORTON

## 2024-11-25 ENCOUNTER — TELEPHONE (OUTPATIENT)
Dept: INTERNAL MEDICINE | Facility: CLINIC | Age: 84
End: 2024-11-25
Payer: COMMERCIAL

## 2024-11-25 DIAGNOSIS — Z53.9 DIAGNOSIS NOT YET DEFINED: Primary | ICD-10-CM

## 2024-11-25 PROCEDURE — 99207 PR MD RECERTIFICATION HHA PT: CPT | Performed by: INTERNAL MEDICINE

## 2024-11-25 RX ORDER — METOPROLOL SUCCINATE 25 MG/1
25 TABLET, EXTENDED RELEASE ORAL DAILY
Qty: 30 TABLET | Refills: 11 | Status: SHIPPED | OUTPATIENT
Start: 2024-11-25

## 2024-11-25 NOTE — TELEPHONE ENCOUNTER
Home health certfication and Plan of care for 11/23/24 to 1/24/25 form recieved via fax. Form in your mailbox for signature.

## 2024-11-25 NOTE — TELEPHONE ENCOUNTER
Spoke with patient and discussed recommendations. Patient states she will start the Metoprolol but wants to wait until after her appointment on 12/2 with hematology before she makes a decision about Eliquis.  Patient does not want to follow with Dr. Kenny and prefers to see Dr. Waller.  Follow up given 12/18/24 with Dr. Waller.  Veronica Dominguez RN on 11/25/2024 at 4:05 PM

## 2024-11-26 NOTE — TELEPHONE ENCOUNTER
Copied from CRM #[id number]. Topic: MW Medication/Rx - MW Rx Refill  >> Nov 26, 2024  2:03 PM Jaqui L[address] wrote:  olga lafleur called to request a medication refill and is out of medications or critically low during working hours. Medication is:  Listed on Med Management list. [facility] to process refill: Selected 'Wrap Up CRM' and created new Refill Med Encounter after clicking 'Convert to Clinical Call'. Selected reason for call 'Refill Request'. Pended medication. Sent Med template and routed as high priority to [facility] CLIN[facility] MSG POOL. Readback full message.    -- DO NOT REPLY / DO NOT REPLY ALL --  -- This inbox is not monitored. If this was sent to the wrong provider or department, reroute message to  [facility] Reroute pool. --  -- Message is from Engagement Center Operations ([facility]) --      Message Type:  Refill Medication   Refill request for Pended medication named:     levETIRAcetam (KEPPRA) 750 MG tablet     Preferred pharmacy verified, and selected.   [id number] - [place], [place] - [address] AT [facility]    Is the patient OUT of Medication?  Yes and During Work Hours Medication Refills handled by [facility] - route as high priority to [facility]IN[facility] MSG pool. Patient has been advised it will be addressed within 1 business day.     Message:                    Paperwork signed and faxed.     Patient voiding and stooling. Breastfeeding and tolerating gavage feeds. Continues on oral diuretics and sodium chloride. (+) BC from 9/9. New BC and labs drawn. IV started. Amp, gent, and vanco given today. Oxygen needs decreased to 3/4L 23.5%. Tolerating well. No virgil/desat spells. Bottom remains red. Will continue to monitor.

## 2024-12-02 ENCOUNTER — ONCOLOGY VISIT (OUTPATIENT)
Dept: ONCOLOGY | Facility: CLINIC | Age: 84
End: 2024-12-02
Attending: INTERNAL MEDICINE
Payer: COMMERCIAL

## 2024-12-02 VITALS
SYSTOLIC BLOOD PRESSURE: 134 MMHG | WEIGHT: 239 LBS | OXYGEN SATURATION: 98 % | DIASTOLIC BLOOD PRESSURE: 80 MMHG | HEART RATE: 84 BPM | HEIGHT: 68 IN | BODY MASS INDEX: 36.22 KG/M2 | RESPIRATION RATE: 18 BRPM | TEMPERATURE: 98 F

## 2024-12-02 DIAGNOSIS — D50.8 IRON DEFICIENCY ANEMIA SECONDARY TO INADEQUATE DIETARY IRON INTAKE: Primary | ICD-10-CM

## 2024-12-02 PROCEDURE — 99214 OFFICE O/P EST MOD 30 MIN: CPT | Performed by: INTERNAL MEDICINE

## 2024-12-02 PROCEDURE — G0463 HOSPITAL OUTPT CLINIC VISIT: HCPCS | Performed by: INTERNAL MEDICINE

## 2024-12-02 ASSESSMENT — PAIN SCALES - GENERAL: PAINLEVEL_OUTOF10: NO PAIN (0)

## 2024-12-02 NOTE — PROGRESS NOTES
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 Cost was prohibitive, plan to switch to eliquis in January 2025. Patient and her daughter will ask their pharmacist and evaluate for her healthcare plan. 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 PLC 152K 9/5/2024--> 70K on 9/13/2024 and 59K on 9/16/2024. CT imaging without malignancy however cirrhosis and splenomegaly plus diverticulosis  Atrial fibrillation on chronic anticoagulation.  Cirrhosis.  Congestive heart failure.  Coronary artery disease.  Diabetes mellitus type 2.  Fibromyalgia.  GERD.  Gout.  Sleep apnea.       History of present illness: Savanna Rehman is a 84 year old patient admitted to [facility] for PLT count of 2K presented with petechia. NO precipitating factors. CT imaging without malignancy however cirrhosis and splenomegaly plus diverticulosis noted. Baseline plt ~100K with underlying cirrhosis. Given abrupt drop presume cause of acute thrombocytopenia is ITP. Reports recent initiation of Protonix. S/p IVIG 8/31/24 and 9/1/24 S/p Dex 40mg x 4 days (8/31-9/3/24). PLT improved. Switched to Eliquis from coumadin.     Interim  history  She was discharged to the TCU In September and her plts have been slowly uptrending. She denied any bleeding- she is back home- daughter not present during the visit. PLC at 89K on 10/21 she currently is on asa 81 mg daily. She reports a bruise from trauma to her arm. Reports +2 LE edema. On lasix but doesn't recall the dose.     Review of Systems: See interval hx. Denies fevers, chills, HA, dizziness, n/t, changes in vision, cough, sore throat, CP, SOB, abdominal pain, N/V, diarrhea, changes in urination, bleeding, bruising, rash.     PMHx and Social Hx reviewed per EPIC.    Medications:  Current Outpatient Medications   Medication Sig Dispense Refill    aspirin 81 MG EC tablet Take 81 mg by mouth daily.      cholecalciferol (VITAMIN D3) 10 mcg (400 units) TABS tablet Take 10 mcg by mouth daily.      EPINEPHrine (ANY BX GENERIC EQUIV) 0.3 MG/0.3ML injection 2-pack Inject 0.3 mLs (0.3 mg) into the muscle as needed for anaphylaxis May repeat one time in 5-15 minutes if response to initial dose is inadequate. 2 each 3    furosemide (LASIX) 20 MG tablet Take 1 tablet (20 mg) by mouth daily. 4 tablet 0    furosemide (LASIX) 40 MG tablet Take 1 tablet by mouth once daily 90 tablet 1    glipiZIDE (GLUCOTROL XL) 2.5 MG 24 hr tablet Take 1 tablet by mouth twice daily 180 tablet 3    loperamide (IMODIUM) 2 MG capsule TAKE 1 CAPSULE BY MOUTH 4 TIMES DAILY AS NEEDED FOR DIARRHEA 60 capsule 0    metoprolol succinate ER (TOPROL XL) 25 MG 24 hr tablet Take 1 tablet (25 mg) by mouth daily. 30 tablet 11       Allergies   Allergen Reactions    Augmented Betamethasone Diprop [Betamethasone] Other (See Comments)     Severe yeast infection    Petrolatum Anaphylaxis and Swelling     Rash and swelling    Shellfish-Derived Products Anaphylaxis     Tongue swelling    Aspirin Swelling     tongue swelling    Bacitracin      Rash swelling    Bactrim [Sulfamethoxazole-Trimethoprim] Dizziness    Darvon [Propoxyphene] Swelling      Throat closes    Dilaudid [Hydromorphone]      No side effects from fentanyl June 2023    Levaquin [Levofloxacin] Swelling     Tongue swelling    Lidocaine      Facial swelling     Morphine Unknown     Per [facility] 2/23/24    Neomycin Swelling     rash    Neosporin [Neomycin-Polymyxin-Gramicidin] Swelling     rash    Nitrofurantoin      SOB, GI upset,    Oxycodone      Severe itching    Percocet [Oxycodone-Acetaminophen] Unknown    Percodan [Oxycodone-Aspirin]      Severe itching    Polymyxin B     Pramoxine     Tramadol     Vicodin [Hydrocodone-Acetaminophen]      Severe itching      Xarelto [Rivaroxaban]     Adhesive Tape Rash     Band aids     Codeine Rash    Hydrocortisone Rash and Swelling    Other Environmental Allergy Rash     Adhesive tape   Band aids          EXAM:    LMP  (LMP Unknown)     GENERAL:  female, in no acute distress.  Alert and oriented x3.   HEENT:  Normocephalic, atraumatic.  PERRL, oropharynx clear with no sores or thrush.   LYMPH NODES:  No visible cervical, axillary lymphadenopathy appreciated.  LUNGS:  no wheezing  ABDOMEN:  Soft, nontender and nondistended.  Bowel sounds heard x4.  No apparent hepatosplenomegaly.   PSYCH: Calm and cooperative     CBC RESULTS:   Recent Labs   Lab Test 10/21/24  1314   WBC 5.7   RBC 4.35   HGB 13.6   HCT 39.4   MCV 91   MCH 31.3   MCHC 34.5   RDW 13.4   PLT 89*           30 minutes spent on the date of the encounter doing chart review, review of test results, interpretation of tests, patient visit, and documentation         Patsy Pompa  Hematology/Oncology  [facility]

## 2024-12-02 NOTE — NURSING NOTE
"Oncology Rooming Note    December 2, 2024 2:01 PM   Savanna Rehman is a 84 year old female who presents for:    Chief Complaint   Patient presents with    Oncology Clinic Visit     Initial Vitals: /80   Pulse 84   Temp 98  F (36.7  C) (Tympanic)   Resp 18   Ht 1.727 m (5' 8\")   Wt 108.4 kg (239 lb)   LMP  (LMP Unknown)   SpO2 98%   BMI 36.34 kg/m   Estimated body mass index is 36.34 kg/m  as calculated from the following:    Height as of this encounter: 1.727 m (5' 8\").    Weight as of this encounter: 108.4 kg (239 lb). Body surface area is 2.28 meters squared.  No Pain (0) Comment: Data Unavailable   No LMP recorded (lmp unknown). Patient is postmenopausal.  Allergies reviewed: Yes  Medications reviewed: Yes    Medications: Medication refills not needed today.  Pharmacy name entered into Consulting Services: [facility] - [address]    Frailty Screening:   Is the patient here for a new oncology consult visit in cancer care? 2. No      Clinical concerns: f/u       Ifeoma Mcclellan CMA              "

## 2024-12-02 NOTE — LETTER
12/2/2024      Savanna Rehman  [address]  [place] [address]      Dear Colleague,    Thank you for referring your patient, Savanna Rehman, to the [facility]. Please see a copy of my visit note below.    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 Cost was prohibitive, plan to switch to eliquis in January 2025. Patient and her daughter will ask their pharmacist and evaluate for her healthcare plan. 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 PLC 152K 9/5/2024--> 70K on 9/13/2024 and 59K on 9/16/2024. CT imaging without malignancy however cirrhosis and splenomegaly plus diverticulosis  Atrial fibrillation on chronic anticoagulation.  Cirrhosis.  Congestive heart failure.  Coronary artery disease.  Diabetes mellitus type 2.  Fibromyalgia.  GERD.  Gout.  Sleep apnea.       History of present illness: Savanna Rehman is a 84 year old patient admitted to [facility] for PLT count of 2K presented with petechia. NO precipitating factors. CT imaging without malignancy however cirrhosis and splenomegaly plus diverticulosis noted. Baseline  plt ~100K with underlying cirrhosis. Given abrupt drop presume cause of acute thrombocytopenia is ITP. Reports recent initiation of Protonix. S/p IVIG 8/31/24 and 9/1/24 S/p Dex 40mg x 4 days (8/31-9/3/24). PLT improved. Switched to Eliquis from coumadin.     Interim history  She was discharged to the TCU In September and her plts have been slowly uptrending. She denied any bleeding- she is back home- daughter not present during the visit. PLC at 89K on 10/21 she currently is on asa 81 mg daily. She reports a bruise from trauma to her arm. Reports +2 LE edema. On lasix but doesn't recall the dose.     Review of Systems: See interval hx. Denies fevers, chills, HA, dizziness, n/t, changes in vision, cough, sore throat, CP, SOB, abdominal pain, N/V, diarrhea, changes in urination, bleeding, bruising, rash.     PMHx and Social Hx reviewed per EPIC.    Medications:  Current Outpatient Medications   Medication Sig Dispense Refill     aspirin 81 MG EC tablet Take 81 mg by mouth daily.       cholecalciferol (VITAMIN D3) 10 mcg (400 units) TABS tablet Take 10 mcg by mouth daily.       EPINEPHrine (ANY BX GENERIC EQUIV) 0.3 MG/0.3ML injection 2-pack Inject 0.3 mLs (0.3 mg) into the muscle as needed for anaphylaxis May repeat one time in 5-15 minutes if response to initial dose is inadequate. 2 each 3     furosemide (LASIX) 20 MG tablet Take 1 tablet (20 mg) by mouth daily. 4 tablet 0     furosemide (LASIX) 40 MG tablet Take 1 tablet by mouth once daily 90 tablet 1     glipiZIDE (GLUCOTROL XL) 2.5 MG 24 hr tablet Take 1 tablet by mouth twice daily 180 tablet 3     loperamide (IMODIUM) 2 MG capsule TAKE 1 CAPSULE BY MOUTH 4 TIMES DAILY AS NEEDED FOR DIARRHEA 60 capsule 0     metoprolol succinate ER (TOPROL XL) 25 MG 24 hr tablet Take 1 tablet (25 mg) by mouth daily. 30 tablet 11       Allergies   Allergen Reactions     Augmented Betamethasone Diprop [Betamethasone] Other (See Comments)     Severe yeast infection     Petrolatum  Anaphylaxis and Swelling     Rash and swelling     Shellfish-Derived Products Anaphylaxis     Tongue swelling     Aspirin Swelling     tongue swelling     Bacitracin      Rash swelling     Bactrim [Sulfamethoxazole-Trimethoprim] Dizziness     Darvon [Propoxyphene] Swelling     Throat closes     Dilaudid [Hydromorphone]      No side effects from fentanyl June 2023     Levaquin [Levofloxacin] Swelling     Tongue swelling     Lidocaine      Facial swelling      Morphine Unknown     Per [facility] 2/23/24     Neomycin Swelling     rash     Neosporin [Neomycin-Polymyxin-Gramicidin] Swelling     rash     Nitrofurantoin      SOB, GI upset,     Oxycodone      Severe itching     Percocet [Oxycodone-Acetaminophen] Unknown     Percodan [Oxycodone-Aspirin]      Severe itching     Polymyxin B      Pramoxine      Tramadol      Vicodin [Hydrocodone-Acetaminophen]      Severe itching       Xarelto [Rivaroxaban]      Adhesive Tape Rash     Band aids      Codeine Rash     Hydrocortisone Rash and Swelling     Other Environmental Allergy Rash     Adhesive tape   Band aids          EXAM:    LMP  (LMP Unknown)     GENERAL:  female, in no acute distress.  Alert and oriented x3.   HEENT:  Normocephalic, atraumatic.  PERRL, oropharynx clear with no sores or thrush.   LYMPH NODES:  No visible cervical, axillary lymphadenopathy appreciated.  LUNGS:  no wheezing  ABDOMEN:  Soft, nontender and nondistended.  Bowel sounds heard x4.  No apparent hepatosplenomegaly.   PSYCH: Calm and cooperative     CBC RESULTS:   Recent Labs   Lab Test 10/21/24  1314   WBC 5.7   RBC 4.35   HGB 13.6   HCT 39.4   MCV 91   MCH 31.3   MCHC 34.5   RDW 13.4   PLT 89*           30 minutes spent on the date of the encounter doing chart review, review of test results, interpretation of tests, patient visit, and documentation         Patsy Pompa  Hematology/Oncology  [facility]                 Again, thank you for allowing me to  participate in the care of your patient.        Sincerely,        Patsy Pompa MD

## 2024-12-09 ENCOUNTER — TELEPHONE (OUTPATIENT)
Dept: INTERNAL MEDICINE | Facility: CLINIC | Age: 84
End: 2024-12-09
Payer: COMMERCIAL

## 2024-12-09 NOTE — TELEPHONE ENCOUNTER
Patient calls, is upset she did not get a nurse at Dr. Payan's office. States she is starting Eliquis on Jan 1 2025. Is wondering if she can take 800 mg of Magnesium while on Eliquis and all her other medications. Writer informed her I would send this on to Dr. Payan, patient wanted only to speak to a team member at Dr. Payan's office. She states she will call back.   Writer will forward information to PCP.

## 2024-12-10 ENCOUNTER — TELEPHONE (OUTPATIENT)
Dept: CARDIOLOGY | Facility: CLINIC | Age: 84
End: 2024-12-10
Payer: COMMERCIAL

## 2024-12-10 ENCOUNTER — TELEPHONE (OUTPATIENT)
Dept: INTERNAL MEDICINE | Facility: CLINIC | Age: 84
End: 2024-12-10
Payer: COMMERCIAL

## 2024-12-10 NOTE — TELEPHONE ENCOUNTER
Missed visit the week of 10/27/24 - 11/2/24 order received via fax. Form in your mailbox to be signed.

## 2024-12-10 NOTE — TELEPHONE ENCOUNTER
"Patient calls back to follow-up, she reports she wants to start a magnesium supplement as her \"innards are shot\". 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 She has heard/read that magnesium is good for energy levels and organ function so she thought she should start taking a magnesium supplement. PCP please advise if OK to take magnesium considering patient's medication and the fact she will start eliquis in 2025. If so, Patient was recommended to take 800 mg considering her age, does PCP recommend this dosing?    Summer RN 8:29 AM December 10, 2024   [facility]    "

## 2024-12-10 NOTE — TELEPHONE ENCOUNTER
[facility] Call Center    Phone Message    May a detailed message be left on voicemail: yes     Reason for Call: Other: Just a FYI, patient wanted to let Dr. Waller and care team know they cancelled their appt on 12/18 since they are doing better and feel they don't need to be seen this soon. Patient stated there were some issues about their device monitor being unplugged, so when they plugged it back in then it showed reports that were abnormal, but everything it fine now. If any questions, please call patient.      Action Taken: Other: Cardiology    Travel Screening: Not Applicable     Thank you!  [facility]

## 2024-12-10 NOTE — TELEPHONE ENCOUNTER
Patient scheduled appointment already. Writer offered sooner appointment, she did not want a sooner appointment due to the holidays.     Appointments in Next Year      Jan 16, 2025 2:30 PM  (Arrive by 2:10 PM)  Provider Visit with Taylor Payan MD  [facility] ([facility] ) [phone]     Summer RN 2:20 PM December 10, 2024   [facility]

## 2024-12-10 NOTE — TELEPHONE ENCOUNTER
Please have patient's schedule visit for follow-up, magnesium discussion and lab work if appropriate.  Please use provider approval slot.

## 2024-12-12 ENCOUNTER — DOCUMENTATION ONLY (OUTPATIENT)
Dept: ANTICOAGULATION | Facility: CLINIC | Age: 84
End: 2024-12-12
Payer: COMMERCIAL

## 2024-12-12 NOTE — PROGRESS NOTES
ANTICOAGULATION DIRECT ORAL ANTICOAGULANT MONITORING    SUBJECTIVE     The [facility] is evaluating Savanna Rehman's Apixaban (Eliquis) as part of its Anticoagulation Monitoring Program.    Indication:Atrial Fibrillation  Current dose per medication list: Apixaban 2.5 mg TWICE daily  Recent hospitalizations/ED/Office Visits for bleeding/clotting concerns: No  Other bleeding or side effect concerns: Yes: low platelets, see below  Additional findings: From Oncology visit on 12/2/24 - Anticoagulation for Afib,I had recommended Eliquis to 2.5 mg po bid given her age, low plts and borderline low EGFR. Cost was prohibitive, plan to switch to eliquis in January 2025. Patient and her daughter will ask their pharmacist and evaluate for her healthcare plan. Currently on Asa 81 mg daily- switch to Eliquis 2.5 mg po bid starting in January 48 hours after stopping Asa    OBJECTIVE     Age: 84 year old    Wt Readings from Last 2 Encounters:   12/02/24 108.4 kg (239 lb)   10/24/24 111.9 kg (246 lb 9.6 oz)      Lab Results   Component Value Date    CR 1.03 (H) 09/13/2024    CR 1.17 (H) 09/07/2024    CR 0.95 09/03/2024     Creatinine Clearance (using actual bodyweight, mL/min):     Lab Results   Component Value Date    HGB 13.6 10/21/2024    HGB 14.7 02/22/2021    PLT 89 10/21/2024     02/22/2021     ASSESSMENT/PLAN     A chart review for Direct Oral Anticoagulant (DOAC) Stewardship has been completed for:     Dosing: provider documented clinical decision for non- package insert dosing noted: See above    Plan made per [facility] anticoagulation protocol    Lynette Chavez, RN  Anticoagulation Clinic

## 2024-12-17 NOTE — TELEPHONE ENCOUNTER
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

## 2024-12-19 DIAGNOSIS — R19.7 DIARRHEA, UNSPECIFIED TYPE: Primary | ICD-10-CM

## 2024-12-26 DIAGNOSIS — Z53.9 DIAGNOSIS NOT YET DEFINED: Primary | ICD-10-CM

## 2024-12-26 PROCEDURE — G0179 MD RECERTIFICATION HHA PT: HCPCS | Performed by: INTERNAL MEDICINE

## 2025-01-01 ENCOUNTER — NURSE TRIAGE (OUTPATIENT)
Dept: NURSING | Facility: CLINIC | Age: 85
End: 2025-01-01
Payer: COMMERCIAL

## 2025-01-01 NOTE — TELEPHONE ENCOUNTER
Nurse Triage SBAR    Is this a 2nd Level Triage? NO    Situation: Patient calling with concerns about the cost of her Eliquis.    Background:  Patient is on aspirin waiting to go on eliquis at the beginning of the year. Patient expected Eliquis to be covered but ended up having to pay $376 for a 90 day supply     Assessment: N/A    Protocol Recommended Disposition:   Call PCP When Office is Open    Recommendation: Advised patient reach out to her insurance company for assistance and/or call the prescribing provider tomorrow when clinic is open. Patient verbalized understanding and agreement.    Maria Fernanda Ventura RN  01/01/25 11:57 AM  [facility] Nurse Advisor    Reason for Disposition   [1] Caller requesting NON-URGENT health information AND [2] PCP's office is the best resource    Protocols used: Information Only Call - No Triage-A-

## 2025-01-07 ENCOUNTER — PATIENT OUTREACH (OUTPATIENT)
Dept: ONCOLOGY | Facility: CLINIC | Age: 85
End: 2025-01-07
Payer: COMMERCIAL

## 2025-01-07 NOTE — PROGRESS NOTES
Savanna called and LVM stating she may or may not be having side effects.  Patient did not state what the side effects were from. She left phone number [phone].    Writer called patient back at [phone] and LVM for patient to call  clinic at [phone] and ask for Sheree.    Sheree Herrera, RN, BSN.  RN Care Coordinator    [facility]   [phone]

## 2025-01-08 ENCOUNTER — TELEPHONE (OUTPATIENT)
Dept: INTERNAL MEDICINE | Facility: CLINIC | Age: 85
End: 2025-01-08
Payer: COMMERCIAL

## 2025-01-08 NOTE — PROGRESS NOTES
Writer called patient to follow up regarding message she left yesterday 1/7/25. LVM for patient to call [phone] and ask for Sheree.    Sheree Herrera, RN, BSN.  RN Care Coordinator    [facility]   [phone]

## 2025-01-08 NOTE — PROGRESS NOTES
Patient called and LVM to call her back at [phone].    Sheree Herrera, RN, BSN.  RN Care Coordinator    [facility]   [phone]

## 2025-01-08 NOTE — TELEPHONE ENCOUNTER
Patient Returning Call    Reason for call:  woul not give info    Information relayed to patient:  care team will call    Patient has additional questions:  N/A    What are your questions/concerns:  n/a    Could we send this information to you in [employer] or would you prefer to receive a phone call?:   Patient would prefer a phone call   Okay to leave a detailed message?: N/A at Home number on file [phone] (home)

## 2025-01-08 NOTE — PROGRESS NOTES
Writer spoke with patient regarding symptoms she is having.  Patient asked if Eliquis has a side effect of diarrhea/constipation. Writer looked up side effects and asked pharmacy via teams.  Per Newton Lira (pharmacy) that is not a common side effect.  Writer relayed information to patient.  Patient did state she is seeing GI and will follow up with them in regards to her diverticulitis.  Patient thanked writer for following up.    Sheree Herrera, RN, BSN.  RN Care Coordinator    [facility]   [phone]

## 2025-01-09 ENCOUNTER — TELEPHONE (OUTPATIENT)
Dept: INTERNAL MEDICINE | Facility: CLINIC | Age: 85
End: 2025-01-09
Payer: COMMERCIAL

## 2025-01-09 NOTE — TELEPHONE ENCOUNTER
Called and spoke with patient. She wanted to ask if she should get the flu and covid vaccinations at her appointment. Writer stated this can be discussed with the Dr during the appointment. Patient agreed to plan

## 2025-01-11 ENCOUNTER — HEALTH MAINTENANCE LETTER (OUTPATIENT)
Age: 85
End: 2025-01-11

## 2025-01-13 ENCOUNTER — TELEPHONE (OUTPATIENT)
Dept: INTERNAL MEDICINE | Facility: CLINIC | Age: 85
End: 2025-01-13
Payer: COMMERCIAL

## 2025-01-14 ENCOUNTER — MEDICAL CORRESPONDENCE (OUTPATIENT)
Dept: HEALTH INFORMATION MANAGEMENT | Facility: CLINIC | Age: 85
End: 2025-01-14

## 2025-01-16 ENCOUNTER — OFFICE VISIT (OUTPATIENT)
Dept: INTERNAL MEDICINE | Facility: CLINIC | Age: 85
End: 2025-01-16
Payer: COMMERCIAL

## 2025-01-16 VITALS
SYSTOLIC BLOOD PRESSURE: 136 MMHG | OXYGEN SATURATION: 96 % | BODY MASS INDEX: 34.17 KG/M2 | RESPIRATION RATE: 20 BRPM | WEIGHT: 225.5 LBS | DIASTOLIC BLOOD PRESSURE: 82 MMHG | TEMPERATURE: 97.4 F | HEART RATE: 65 BPM | HEIGHT: 68 IN

## 2025-01-16 DIAGNOSIS — E11.22 TYPE 2 DIABETES MELLITUS WITH STAGE 3A CHRONIC KIDNEY DISEASE, WITHOUT LONG-TERM CURRENT USE OF INSULIN (H): Primary | ICD-10-CM

## 2025-01-16 DIAGNOSIS — I48.91 ATRIAL FIBRILLATION, UNSPECIFIED TYPE (H): ICD-10-CM

## 2025-01-16 DIAGNOSIS — N18.31 TYPE 2 DIABETES MELLITUS WITH STAGE 3A CHRONIC KIDNEY DISEASE, WITHOUT LONG-TERM CURRENT USE OF INSULIN (H): Primary | ICD-10-CM

## 2025-01-16 DIAGNOSIS — F33.1 MODERATE EPISODE OF RECURRENT MAJOR DEPRESSIVE DISORDER (H): ICD-10-CM

## 2025-01-16 DIAGNOSIS — K74.69 OTHER CIRRHOSIS OF LIVER (H): ICD-10-CM

## 2025-01-16 DIAGNOSIS — N18.32 STAGE 3B CHRONIC KIDNEY DISEASE (H): ICD-10-CM

## 2025-01-16 PROBLEM — E66.812 CLASS 2 SEVERE OBESITY DUE TO EXCESS CALORIES WITH SERIOUS COMORBIDITY IN ADULT (H): Status: RESOLVED | Noted: 2024-06-07 | Resolved: 2025-01-16

## 2025-01-16 PROBLEM — E66.01 CLASS 2 SEVERE OBESITY DUE TO EXCESS CALORIES WITH SERIOUS COMORBIDITY IN ADULT (H): Status: RESOLVED | Noted: 2024-06-07 | Resolved: 2025-01-16

## 2025-01-16 RX ORDER — LANCETS
EACH MISCELLANEOUS
Qty: 100 EACH | Refills: 6 | Status: SHIPPED | OUTPATIENT
Start: 2025-01-16

## 2025-01-16 NOTE — PROGRESS NOTES
"  Assessment & Plan     Type 2 diabetes mellitus with stage 3a chronic kidney disease, without long-term current use of insulin (H)  Requesting for glucometer refill.  Update A1c lab work.  Continue glipizide at the current dose.  - HEMOGLOBIN A1C; Future  - blood glucose monitoring (NO BRAND SPECIFIED) meter device kit; Use to test blood sugar one times daily or as directed. Preferred blood glucose meter OR supplies to accompany: Blood Glucose Monitor Brands: per insurance.  - blood glucose (NO BRAND SPECIFIED) test strip; Use to test blood sugar one times daily or as directed. To accompany: Blood Glucose Monitor Brands: per insurance.  - thin (NO BRAND SPECIFIED) lancets; Use with lanceting device. To accompany: Blood Glucose Monitor Brands: per insurance.      Other cirrhosis of liver (H)  Follows up with the GI team.    Atrial fibrillation, unspecified type (H)  Currently using apixaban medication at 2.5 mg twice daily.    Moderate episode of recurrent major depressive disorder (H)  Has stopped taking Lexapro medication due to concerns of increased sleepiness while on the medication.  Does endorse having low moods but feels that this is most sleep triggered by ongoing use with the [employer] as well as sicknesses associated with close friends.  Does not really feel that she needs a medication to help cope with the symptoms and not interested in trial of any other medication for now.    Stage 3b chronic kidney disease (H)  Patient would like to hold off on lab work today as patient will be completing lab work with the GI team during her visit tomorrow.          BMI  Estimated body mass index is 34.29 kg/m  as calculated from the following:    Height as of this encounter: 1.727 m (5' 8\").    Weight as of this encounter: 102.3 kg (225 lb 8 oz).             Mamadou Corrales is a 84 year old, presenting for the following health issues:  Follow Up        1/16/2025     2:11 PM   Additional Questions   Roomed by Aure" "  Accompanied by Self     HPI       Diabetes Follow-up    How often are you checking your blood sugar? One time daily  What time of day are you checking your blood sugars (select all that apply)?  Before meals  Have you had any blood sugars above 200?  No  Have you had any blood sugars below 70?  No  What symptoms do you notice when your blood sugar is low?  None  What concerns do you have today about your diabetes? None   Do you have any of these symptoms? (Select all that apply)  Numbness in feet  Have you had a diabetic eye exam in the last 12 months? No        BP Readings from Last 2 Encounters:   01/16/25 136/82   12/02/24 134/80     Hemoglobin A1C (%)   Date Value   07/05/2024 6.3 (H)   02/08/2024 6.1 (H)   02/22/2021 6.3 (H)   09/14/2020 6.4 (H)     LDL Cholesterol Calculated (mg/dL)   Date Value   10/19/2023 78   11/07/2022 76   02/22/2021 107 (H)   03/04/2020 111 (H)                 Review of Systems  Constitutional, HEENT, cardiovascular, pulmonary, gi and gu systems are negative, except as otherwise noted.      Objective    /82 (BP Location: Left arm, Patient Position: Sitting, Cuff Size: Adult Large)   Pulse 65   Temp 97.4  F (36.3  C) (Tympanic)   Resp 20   Ht 1.727 m (5' 8\")   Wt 102.3 kg (225 lb 8 oz)   LMP  (LMP Unknown)   SpO2 96%   BMI 34.29 kg/m    Body mass index is 34.29 kg/m .  Physical Exam   GENERAL: alert and no distress  RESP: lungs clear to auscultation - no rales, rhonchi or wheezes  CV: regular rate and rhythm, normal S1 S2  MS: no gross musculoskeletal defects noted, no edema  NEURO: Normal strength and tone, mentation intact and speech normal  PSYCH: mentation appears normal, affect normal            Signed Electronically by: Taylor Payan MD    "

## 2025-01-17 ENCOUNTER — TRANSFERRED RECORDS (OUTPATIENT)
Dept: HEALTH INFORMATION MANAGEMENT | Facility: CLINIC | Age: 85
End: 2025-01-17
Payer: COMMERCIAL

## 2025-01-22 ENCOUNTER — TELEPHONE (OUTPATIENT)
Dept: INTERNAL MEDICINE | Facility: CLINIC | Age: 85
End: 2025-01-22
Payer: COMMERCIAL

## 2025-01-22 NOTE — TELEPHONE ENCOUNTER
Home health certfication and Plan of care 1/22/25 to 3/22/25 form recieved via fax. Form in your mailbox for signature.

## 2025-01-23 NOTE — TELEPHONE ENCOUNTER
Forms completed and faxed to [phone]  [facility]

## 2025-01-31 ENCOUNTER — ANCILLARY PROCEDURE (OUTPATIENT)
Dept: CARDIOLOGY | Facility: CLINIC | Age: 85
End: 2025-01-31
Attending: INTERNAL MEDICINE
Payer: COMMERCIAL

## 2025-01-31 DIAGNOSIS — I48.91 ATRIAL FIBRILLATION (H): ICD-10-CM

## 2025-01-31 DIAGNOSIS — Z95.0 CARDIAC PACEMAKER IN SITU: ICD-10-CM

## 2025-01-31 PROCEDURE — 93296 REM INTERROG EVL PM/IDS: CPT | Performed by: INTERNAL MEDICINE

## 2025-01-31 PROCEDURE — 93294 REM INTERROG EVL PM/LDLS PM: CPT | Performed by: INTERNAL MEDICINE

## 2025-02-03 ENCOUNTER — PATIENT OUTREACH (OUTPATIENT)
Dept: ONCOLOGY | Facility: CLINIC | Age: 85
End: 2025-02-03
Payer: COMMERCIAL

## 2025-02-03 ENCOUNTER — VIRTUAL VISIT (OUTPATIENT)
Dept: ONCOLOGY | Facility: CLINIC | Age: 85
End: 2025-02-03
Attending: INTERNAL MEDICINE
Payer: COMMERCIAL

## 2025-02-03 DIAGNOSIS — D50.8 IRON DEFICIENCY ANEMIA SECONDARY TO INADEQUATE DIETARY IRON INTAKE: Primary | ICD-10-CM

## 2025-02-03 DIAGNOSIS — D69.6 THROMBOCYTOPENIA: ICD-10-CM

## 2025-02-03 LAB
MDC_IDC_EPISODE_DTM: NORMAL
MDC_IDC_EPISODE_DURATION: 0 S
MDC_IDC_EPISODE_DURATION: 1 S
MDC_IDC_EPISODE_DURATION: 2 S
MDC_IDC_EPISODE_ID: 78
MDC_IDC_EPISODE_ID: 79
MDC_IDC_EPISODE_ID: 80
MDC_IDC_EPISODE_ID: 81
MDC_IDC_EPISODE_ID: 82
MDC_IDC_EPISODE_ID: 83
MDC_IDC_EPISODE_ID: 84
MDC_IDC_EPISODE_ID: 85
MDC_IDC_EPISODE_ID: 86
MDC_IDC_EPISODE_ID: 87
MDC_IDC_EPISODE_ID: 88
MDC_IDC_EPISODE_ID: 89
MDC_IDC_EPISODE_ID: 90
MDC_IDC_EPISODE_ID: 91
MDC_IDC_EPISODE_ID: 92
MDC_IDC_EPISODE_TYPE: NORMAL
MDC_IDC_LEAD_CONNECTION_STATUS: NORMAL
MDC_IDC_LEAD_IMPLANT_DT: NORMAL
MDC_IDC_LEAD_LOCATION: NORMAL
MDC_IDC_LEAD_LOCATION_DETAIL_1: NORMAL
MDC_IDC_LEAD_MFG: NORMAL
MDC_IDC_LEAD_MODEL: NORMAL
MDC_IDC_LEAD_POLARITY_TYPE: NORMAL
MDC_IDC_LEAD_SERIAL: NORMAL
MDC_IDC_MSMT_BATTERY_DTM: NORMAL
MDC_IDC_MSMT_BATTERY_REMAINING_LONGEVITY: 162 MO
MDC_IDC_MSMT_BATTERY_RRT_TRIGGER: 2.62
MDC_IDC_MSMT_BATTERY_STATUS: NORMAL
MDC_IDC_MSMT_BATTERY_VOLTAGE: 3.13 V
MDC_IDC_MSMT_LEADCHNL_RV_IMPEDANCE_VALUE: 399 OHM
MDC_IDC_MSMT_LEADCHNL_RV_IMPEDANCE_VALUE: 570 OHM
MDC_IDC_MSMT_LEADCHNL_RV_PACING_THRESHOLD_AMPLITUDE: 0.88 V
MDC_IDC_MSMT_LEADCHNL_RV_PACING_THRESHOLD_PULSEWIDTH: 0.4 MS
MDC_IDC_MSMT_LEADCHNL_RV_SENSING_INTR_AMPL: 12.75 MV
MDC_IDC_MSMT_LEADCHNL_RV_SENSING_INTR_AMPL: 12.75 MV
MDC_IDC_PG_IMPLANT_DTM: NORMAL
MDC_IDC_PG_MFG: NORMAL
MDC_IDC_PG_MODEL: NORMAL
MDC_IDC_PG_SERIAL: NORMAL
MDC_IDC_PG_TYPE: NORMAL
MDC_IDC_SESS_CLINIC_NAME: NORMAL
MDC_IDC_SESS_DTM: NORMAL
MDC_IDC_SESS_TYPE: NORMAL
MDC_IDC_SET_BRADY_HYSTRATE: NORMAL
MDC_IDC_SET_BRADY_LOWRATE: 60 {BEATS}/MIN
MDC_IDC_SET_BRADY_MAX_SENSOR_RATE: 130 {BEATS}/MIN
MDC_IDC_SET_BRADY_MODE: NORMAL
MDC_IDC_SET_LEADCHNL_RV_PACING_AMPLITUDE: 2 V
MDC_IDC_SET_LEADCHNL_RV_PACING_ANODE_ELECTRODE_1: NORMAL
MDC_IDC_SET_LEADCHNL_RV_PACING_ANODE_LOCATION_1: NORMAL
MDC_IDC_SET_LEADCHNL_RV_PACING_CAPTURE_MODE: NORMAL
MDC_IDC_SET_LEADCHNL_RV_PACING_CATHODE_ELECTRODE_1: NORMAL
MDC_IDC_SET_LEADCHNL_RV_PACING_CATHODE_LOCATION_1: NORMAL
MDC_IDC_SET_LEADCHNL_RV_PACING_POLARITY: NORMAL
MDC_IDC_SET_LEADCHNL_RV_PACING_PULSEWIDTH: 0.4 MS
MDC_IDC_SET_LEADCHNL_RV_SENSING_ANODE_ELECTRODE_1: NORMAL
MDC_IDC_SET_LEADCHNL_RV_SENSING_ANODE_LOCATION_1: NORMAL
MDC_IDC_SET_LEADCHNL_RV_SENSING_CATHODE_ELECTRODE_1: NORMAL
MDC_IDC_SET_LEADCHNL_RV_SENSING_CATHODE_LOCATION_1: NORMAL
MDC_IDC_SET_LEADCHNL_RV_SENSING_POLARITY: NORMAL
MDC_IDC_SET_LEADCHNL_RV_SENSING_SENSITIVITY: 0.9 MV
MDC_IDC_SET_ZONE_DETECTION_INTERVAL: 400 MS
MDC_IDC_SET_ZONE_STATUS: NORMAL
MDC_IDC_SET_ZONE_TYPE: NORMAL
MDC_IDC_SET_ZONE_VENDOR_TYPE: NORMAL
MDC_IDC_STAT_BRADY_DTM_END: NORMAL
MDC_IDC_STAT_BRADY_DTM_START: NORMAL
MDC_IDC_STAT_BRADY_RV_PERCENT_PACED: 81.08 %
MDC_IDC_STAT_EPISODE_RECENT_COUNT: 0
MDC_IDC_STAT_EPISODE_RECENT_COUNT: 0
MDC_IDC_STAT_EPISODE_RECENT_COUNT: 17
MDC_IDC_STAT_EPISODE_RECENT_COUNT_DTM_END: NORMAL
MDC_IDC_STAT_EPISODE_RECENT_COUNT_DTM_START: NORMAL
MDC_IDC_STAT_EPISODE_TOTAL_COUNT: 0
MDC_IDC_STAT_EPISODE_TOTAL_COUNT: 0
MDC_IDC_STAT_EPISODE_TOTAL_COUNT: 92
MDC_IDC_STAT_EPISODE_TOTAL_COUNT_DTM_END: NORMAL
MDC_IDC_STAT_EPISODE_TOTAL_COUNT_DTM_START: NORMAL
MDC_IDC_STAT_EPISODE_TYPE: NORMAL

## 2025-02-03 PROCEDURE — 98014 SYNCH AUDIO-ONLY EST MOD 30: CPT | Performed by: INTERNAL MEDICINE

## 2025-02-03 NOTE — LETTER
2/3/2025      Savanna Rehman  [address]  [facility] [address]      Dear Colleague,    Thank you for referring your patient, Savanna Rehman, to the [facility]. Please see a copy of my visit note below.    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 PLC 152K 9/5/2024--> 70K on 9/13/2024 and 59K on 9/16/2024. CT imaging without malignancy however cirrhosis and splenomegaly plus diverticulosis---> PLC 128K on 1/31/2025   Atrial fibrillation on chronic anticoagulation.  Cirrhosis.  Congestive heart failure.  Coronary artery disease.  Diabetes mellitus type 2.  Fibromyalgia.  GERD.  Gout.  Sleep apnea.       History of present illness: Savanna Rehman is a 84 year old patient admitted to [facility] for PLT count of 2K presented with petechia. NO precipitating factors. CT imaging without malignancy however cirrhosis and splenomegaly plus diverticulosis noted. Baseline plt ~100K with underlying cirrhosis. Given abrupt drop presume cause of acute thrombocytopenia is ITP. Reports recent initiation of Protonix. S/p IVIG 8/31/24 and 9/1/24 S/p Dex 40mg x 4 days (8/31-9/3/24). PLT improved. Switched to Eliquis from  coumadin.     Interim history  She was discharged to the TCU In September in 2024 and her plts have been slowly uptrending. She denied any bleeding- she is back home- daughter not present during the visit. She is on Eliquis. She reports bruising while on Eliquis.     She has significant anxiety- reports the elevator not working in the building- she had to stay in for those weeks- one of the attendants in the building was also taken by the police. Reports +1-2 LE edema. On lasix but doesn't recall the dose.     Review of Systems: See interval hx. Denies fevers, chills, HA, dizziness, n/t, changes in vision, cough, sore throat, CP, SOB, abdominal pain, N/V, diarrhea, changes in urination, bleeding, bruising, rash.     PMHx and Social Hx reviewed per EPIC.    Medications:  Current Outpatient Medications   Medication Sig Dispense Refill     apixaban ANTICOAGULANT (ELIQUIS) 2.5 MG tablet Take 1 tablet (2.5 mg) by mouth 2 times daily. 180 tablet 1     blood glucose (NO BRAND SPECIFIED) test strip Use to test blood sugar one times daily or as directed. To accompany: Blood Glucose Monitor Brands: per insurance. 100 strip 6     blood glucose monitoring (NO BRAND SPECIFIED) meter device kit Use to test blood sugar one times daily or as directed. Preferred blood glucose meter OR supplies to accompany: Blood Glucose Monitor Brands: per insurance. 1 kit 0     cholecalciferol (VITAMIN D3) 10 mcg (400 units) TABS tablet Take 10 mcg by mouth daily.       EPINEPHrine (ANY BX GENERIC EQUIV) 0.3 MG/0.3ML injection 2-pack Inject 0.3 mLs (0.3 mg) into the muscle as needed for anaphylaxis May repeat one time in 5-15 minutes if response to initial dose is inadequate. 2 each 3     furosemide (LASIX) 40 MG tablet Take 1 tablet by mouth once daily 90 tablet 1     glipiZIDE (GLUCOTROL XL) 2.5 MG 24 hr tablet Take 1 tablet by mouth twice daily 180 tablet 3     loperamide (IMODIUM) 2 MG capsule TAKE 1 CAPSULE BY MOUTH 4 TIMES DAILY AS NEEDED  FOR DIARRHEA 60 capsule 0     metoprolol succinate ER (TOPROL XL) 25 MG 24 hr tablet Take 1 tablet (25 mg) by mouth daily. 30 tablet 11     thin (NO BRAND SPECIFIED) lancets Use with lanceting device. To accompany: Blood Glucose Monitor Brands: per insurance. 100 each 6       Allergies   Allergen Reactions     Augmented Betamethasone Diprop [Betamethasone] Other (See Comments)     Severe yeast infection     Petrolatum Anaphylaxis and Swelling     Rash and swelling     Shellfish-Derived Products Anaphylaxis     Tongue swelling     Aspirin Swelling     tongue swelling     Bacitracin      Rash swelling     Bactrim [Sulfamethoxazole-Trimethoprim] Dizziness     Darvon [Propoxyphene] Swelling     Throat closes     Dilaudid [Hydromorphone]      No side effects from fentanyl June 2023     Levaquin [Levofloxacin] Swelling     Tongue swelling     Lidocaine      Facial swelling      Morphine Unknown     Per [facility] 2/23/24     Neomycin Swelling     rash     Neosporin [Neomycin-Polymyxin-Gramicidin] Swelling     rash     Nitrofurantoin      SOB, GI upset,     Oxycodone      Severe itching     Percocet [Oxycodone-Acetaminophen] Unknown     Percodan [Oxycodone-Aspirin]      Severe itching     Polymyxin B      Pramoxine      Tramadol      Vicodin [Hydrocodone-Acetaminophen]      Severe itching       Xarelto [Rivaroxaban]      Adhesive Tape Rash     Band aids      Codeine Rash     Hydrocortisone Rash and Swelling     Other Environmental Allergy Rash     Adhesive tape   Band aids          EXAM:    LMP  (LMP Unknown)     Per report    GENERAL:  female, in no acute distress.  Alert and oriented x3.   HEENT:  Normocephalic, atraumatic.  PERRL, oropharynx clear with no sores or thrush.   LYMPH NODES:  No visible cervical, axillary lymphadenopathy appreciated.  LUNGS:  no wheezing  ABDOMEN:  Soft, nontender and nondistended.  Bowel sounds heard x4.  No apparent hepatosplenomegaly.   PSYCH: Calm and cooperative     CBC  RESULTS:   CBC RESULTS:   Recent Labs   Lab Test 01/31/25  1137   WBC 5.7   RBC 4.23   HGB 13.2   HCT 38.4   MCV 91   MCH 31.2   MCHC 34.4   RDW 14.1   *         30 minutes spent on the date of the encounter doing chart review, review of test results, interpretation of tests, patient visit, and documentation       Patsy Pompa  Hematology/Oncology  [facility]                 Again, thank you for allowing me to participate in the care of your patient.        Sincerely,        Patsy Pompa MD    Electronically signed

## 2025-02-03 NOTE — LETTER
2/3/2025      Savanna Rehman  [address]  [facility] [address]      Dear Colleague,    Thank you for referring your patient, Savanna Rehman, to the [facility]. Please see a copy of my visit note below.    [facility]    Hematology/Oncology Consult Note    Date of Consult: 02/03/25   Reason for Consult: Thrombocytopenia - ITP follow up  Oncology/Hematology Physician: MD Aletha    Impression/Plan:   Weekly CBC for ITP not in remission  Repeat Cbc on 2/3/2025- 124K   Return in 12 weeks for MD visit and labs- If PLT 30-50K may consider Promacta 25 mg po daily or Nplate  For notable bleeding or petechia- patient to present to the ED    Anticoagulation for Afib- currently on Eliquis to 2.5 mg po bid - she was switched in January from [facility]  LE edema, pitting- compression stockings and follow up with PCP currently on lasix but believes she is only on 20 mg    Problem List:  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 PLC 152K 9/5/2024--> 70K on 9/13/2024 and 59K on 9/16/2024. CT imaging without malignancy however cirrhosis and splenomegaly plus diverticulosis---> PLC 128K on 1/31/2025   Atrial fibrillation on chronic anticoagulation.  Cirrhosis.  Congestive heart failure.  Coronary artery disease.  Diabetes mellitus type 2.  Fibromyalgia.  GERD.  Gout.  Sleep apnea.       History of present illness: Savanna Rehman is a 84 year old patient admitted to [facility] for PLT count of 2K presented with petechia. NO precipitating factors. CT imaging without malignancy however cirrhosis and splenomegaly plus diverticulosis noted. Baseline plt ~100K with underlying cirrhosis. Given abrupt drop presume cause of acute thrombocytopenia is ITP. Reports recent initiation of Protonix. S/p IVIG 8/31/24 and 9/1/24 S/p Dex 40mg x 4 days (8/31-9/3/24). PLT improved. Switched to Eliquis from  coumadin.     Interim history  She was discharged to the TCU In September in 2024 and her plts have been slowly uptrending. She denied any bleeding- she is back home- daughter not present during the visit. She is on Eliquis. She reports bruising while on Eliquis.     She has significant anxiety- reports the elevator not working in the building- she had to stay in for those weeks- one of the attendants in the building was also taken by the police. Reports +1-2 LE edema. On lasix but doesn't recall the dose.     Review of Systems: See interval hx. Denies fevers, chills, HA, dizziness, n/t, changes in vision, cough, sore throat, CP, SOB, abdominal pain, N/V, diarrhea, changes in urination, bleeding, bruising, rash.     PMHx and Social Hx reviewed per EPIC.    Medications:  Current Outpatient Medications   Medication Sig Dispense Refill     apixaban ANTICOAGULANT (ELIQUIS) 2.5 MG tablet Take 1 tablet (2.5 mg) by mouth 2 times daily. 180 tablet 1     blood glucose (NO BRAND SPECIFIED) test strip Use to test blood sugar one times daily or as directed. To accompany: Blood Glucose Monitor Brands: per insurance. 100 strip 6     blood glucose monitoring (NO BRAND SPECIFIED) meter device kit Use to test blood sugar one times daily or as directed. Preferred blood glucose meter OR supplies to accompany: Blood Glucose Monitor Brands: per insurance. 1 kit 0     cholecalciferol (VITAMIN D3) 10 mcg (400 units) TABS tablet Take 10 mcg by mouth daily.       EPINEPHrine (ANY BX GENERIC EQUIV) 0.3 MG/0.3ML injection 2-pack Inject 0.3 mLs (0.3 mg) into the muscle as needed for anaphylaxis May repeat one time in 5-15 minutes if response to initial dose is inadequate. 2 each 3     furosemide (LASIX) 40 MG tablet Take 1 tablet by mouth once daily 90 tablet 1     glipiZIDE (GLUCOTROL XL) 2.5 MG 24 hr tablet Take 1 tablet by mouth twice daily 180 tablet 3     loperamide (IMODIUM) 2 MG capsule TAKE 1 CAPSULE BY MOUTH 4 TIMES DAILY AS NEEDED  FOR DIARRHEA 60 capsule 0     metoprolol succinate ER (TOPROL XL) 25 MG 24 hr tablet Take 1 tablet (25 mg) by mouth daily. 30 tablet 11     thin (NO BRAND SPECIFIED) lancets Use with lanceting device. To accompany: Blood Glucose Monitor Brands: per insurance. 100 each 6       Allergies   Allergen Reactions     Augmented Betamethasone Diprop [Betamethasone] Other (See Comments)     Severe yeast infection     Petrolatum Anaphylaxis and Swelling     Rash and swelling     Shellfish-Derived Products Anaphylaxis     Tongue swelling     Aspirin Swelling     tongue swelling     Bacitracin      Rash swelling     Bactrim [Sulfamethoxazole-Trimethoprim] Dizziness     Darvon [Propoxyphene] Swelling     Throat closes     Dilaudid [Hydromorphone]      No side effects from fentanyl June 2023     Levaquin [Levofloxacin] Swelling     Tongue swelling     Lidocaine      Facial swelling      Morphine Unknown     Per [facility] 2/23/24     Neomycin Swelling     rash     Neosporin [Neomycin-Polymyxin-Gramicidin] Swelling     rash     Nitrofurantoin      SOB, GI upset,     Oxycodone      Severe itching     Percocet [Oxycodone-Acetaminophen] Unknown     Percodan [Oxycodone-Aspirin]      Severe itching     Polymyxin B      Pramoxine      Tramadol      Vicodin [Hydrocodone-Acetaminophen]      Severe itching       Xarelto [Rivaroxaban]      Adhesive Tape Rash     Band aids      Codeine Rash     Hydrocortisone Rash and Swelling     Other Environmental Allergy Rash     Adhesive tape   Band aids          EXAM:    LMP  (LMP Unknown)     Per report    GENERAL:  female, in no acute distress.  Alert and oriented x3.   HEENT:  Normocephalic, atraumatic.  PERRL, oropharynx clear with no sores or thrush.   LYMPH NODES:  No visible cervical, axillary lymphadenopathy appreciated.  LUNGS:  no wheezing  ABDOMEN:  Soft, nontender and nondistended.  Bowel sounds heard x4.  No apparent hepatosplenomegaly.   PSYCH: Calm and cooperative     CBC  RESULTS:   CBC RESULTS:   Recent Labs   Lab Test 01/31/25  1137   WBC 5.7   RBC 4.23   HGB 13.2   HCT 38.4   MCV 91   MCH 31.2   MCHC 34.4   RDW 14.1   *         30 minutes spent on the date of the encounter doing chart review, review of test results, interpretation of tests, patient visit, and documentation       Patsy Pompa  Hematology/Oncology  [facility]                 Again, thank you for allowing me to participate in the care of your patient.        Sincerely,        Patsy Pompa MD    Electronically signed

## 2025-02-03 NOTE — PROGRESS NOTES
Writer called patient back to confirm patient's message regarding a phone call rather than video visit today with Dr Popma.  LVM with patient.    Sheree Herrera, RN, BSN.  RN Care Coordinator    [facility]   [phone]

## 2025-02-03 NOTE — PROGRESS NOTES
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 PLC 152K 9/5/2024--> 70K on 9/13/2024 and 59K on 9/16/2024. CT imaging without malignancy however cirrhosis and splenomegaly plus diverticulosis---> PLC 128K on 1/31/2025   Atrial fibrillation on chronic anticoagulation.  Cirrhosis.  Congestive heart failure.  Coronary artery disease.  Diabetes mellitus type 2.  Fibromyalgia.  GERD.  Gout.  Sleep apnea.       History of present illness: Savanna Rehman is a 84 year old patient admitted to [facility] for PLT count of 2K presented with petechia. NO precipitating factors. CT imaging without malignancy however cirrhosis and splenomegaly plus diverticulosis noted. Baseline plt ~100K with underlying cirrhosis. Given abrupt drop presume cause of acute thrombocytopenia is ITP. Reports recent initiation of Protonix. S/p IVIG 8/31/24 and 9/1/24 S/p Dex 40mg x 4 days (8/31-9/3/24). PLT improved. Switched to Eliquis from coumadin.     Interim history  She was discharged to the TCU In September in 2024 and her plts have been slowly uptrending. She denied any bleeding- she is back home- daughter not present during the visit. She is on Eliquis. She reports bruising while on Eliquis.      She has significant anxiety- reports the elevator not working in the building- she had to stay in for those weeks- one of the attendants in the building was also taken by the police. Reports +1-2 LE edema. On lasix but doesn't recall the dose.     Review of Systems: See interval hx. Denies fevers, chills, HA, dizziness, n/t, changes in vision, cough, sore throat, CP, SOB, abdominal pain, N/V, diarrhea, changes in urination, bleeding, bruising, rash.     PMHx and Social Hx reviewed per EPIC.    Medications:  Current Outpatient Medications   Medication Sig Dispense Refill    apixaban ANTICOAGULANT (ELIQUIS) 2.5 MG tablet Take 1 tablet (2.5 mg) by mouth 2 times daily. 180 tablet 1    blood glucose (NO BRAND SPECIFIED) test strip Use to test blood sugar one times daily or as directed. To accompany: Blood Glucose Monitor Brands: per insurance. 100 strip 6    blood glucose monitoring (NO BRAND SPECIFIED) meter device kit Use to test blood sugar one times daily or as directed. Preferred blood glucose meter OR supplies to accompany: Blood Glucose Monitor Brands: per insurance. 1 kit 0    cholecalciferol (VITAMIN D3) 10 mcg (400 units) TABS tablet Take 10 mcg by mouth daily.      EPINEPHrine (ANY BX GENERIC EQUIV) 0.3 MG/0.3ML injection 2-pack Inject 0.3 mLs (0.3 mg) into the muscle as needed for anaphylaxis May repeat one time in 5-15 minutes if response to initial dose is inadequate. 2 each 3    furosemide (LASIX) 40 MG tablet Take 1 tablet by mouth once daily 90 tablet 1    glipiZIDE (GLUCOTROL XL) 2.5 MG 24 hr tablet Take 1 tablet by mouth twice daily 180 tablet 3    loperamide (IMODIUM) 2 MG capsule TAKE 1 CAPSULE BY MOUTH 4 TIMES DAILY AS NEEDED FOR DIARRHEA 60 capsule 0    metoprolol succinate ER (TOPROL XL) 25 MG 24 hr tablet Take 1 tablet (25 mg) by mouth daily. 30 tablet 11    thin (NO BRAND SPECIFIED) lancets Use with lanceting device. To accompany: Blood Glucose Monitor Brands: per insurance. 100 each 6        Allergies   Allergen Reactions    Augmented Betamethasone Diprop [Betamethasone] Other (See Comments)     Severe yeast infection    Petrolatum Anaphylaxis and Swelling     Rash and swelling    Shellfish-Derived Products Anaphylaxis     Tongue swelling    Aspirin Swelling     tongue swelling    Bacitracin      Rash swelling    Bactrim [Sulfamethoxazole-Trimethoprim] Dizziness    Darvon [Propoxyphene] Swelling     Throat closes    Dilaudid [Hydromorphone]      No side effects from fentanyl June 2023    Levaquin [Levofloxacin] Swelling     Tongue swelling    Lidocaine      Facial swelling     Morphine Unknown     Per [facility] 2/23/24    Neomycin Swelling     rash    Neosporin [Neomycin-Polymyxin-Gramicidin] Swelling     rash    Nitrofurantoin      SOB, GI upset,    Oxycodone      Severe itching    Percocet [Oxycodone-Acetaminophen] Unknown    Percodan [Oxycodone-Aspirin]      Severe itching    Polymyxin B     Pramoxine     Tramadol     Vicodin [Hydrocodone-Acetaminophen]      Severe itching      Xarelto [Rivaroxaban]     Adhesive Tape Rash     Band aids     Codeine Rash    Hydrocortisone Rash and Swelling    Other Environmental Allergy Rash     Adhesive tape   Band aids          EXAM:    LMP  (LMP Unknown)     Per report    GENERAL:  female, in no acute distress.  Alert and oriented x3.   HEENT:  Normocephalic, atraumatic.  PERRL, oropharynx clear with no sores or thrush.   LYMPH NODES:  No visible cervical, axillary lymphadenopathy appreciated.  LUNGS:  no wheezing  ABDOMEN:  Soft, nontender and nondistended.  Bowel sounds heard x4.  No apparent hepatosplenomegaly.   PSYCH: Calm and cooperative     CBC RESULTS:   CBC RESULTS:   Recent Labs   Lab Test 01/31/25  1137   WBC 5.7   RBC 4.23   HGB 13.2   HCT 38.4   MCV 91   MCH 31.2   MCHC 34.4   RDW 14.1   *         30 minutes spent on the date of the encounter doing chart review, review of test results, interpretation of tests, patient visit, and  documentation       Faysal Aletha  Hematology/Oncology  [facility]

## 2025-02-03 NOTE — PROGRESS NOTES
Patient called in to clinic to state she cannot do a video call today with Dr Pompa as her cell phone is broken.  She just wants to do a phone visit with him.     Sheree Herrera, RN, BSN.  RN Care Coordinator    [facility]   [phone]

## 2025-02-06 ENCOUNTER — OFFICE VISIT (OUTPATIENT)
Dept: INTERNAL MEDICINE | Facility: CLINIC | Age: 85
End: 2025-02-06
Payer: COMMERCIAL

## 2025-02-06 VITALS
BODY MASS INDEX: 35.01 KG/M2 | RESPIRATION RATE: 18 BRPM | SYSTOLIC BLOOD PRESSURE: 134 MMHG | WEIGHT: 231 LBS | OXYGEN SATURATION: 94 % | DIASTOLIC BLOOD PRESSURE: 84 MMHG | HEART RATE: 72 BPM | HEIGHT: 68 IN | TEMPERATURE: 98 F

## 2025-02-06 DIAGNOSIS — N18.31 STAGE 3A CHRONIC KIDNEY DISEASE (H): ICD-10-CM

## 2025-02-06 DIAGNOSIS — N18.31 TYPE 2 DIABETES MELLITUS WITH STAGE 3A CHRONIC KIDNEY DISEASE, WITHOUT LONG-TERM CURRENT USE OF INSULIN (H): Primary | ICD-10-CM

## 2025-02-06 DIAGNOSIS — I48.91 ATRIAL FIBRILLATION, UNSPECIFIED TYPE (H): ICD-10-CM

## 2025-02-06 DIAGNOSIS — E11.22 TYPE 2 DIABETES MELLITUS WITH STAGE 3A CHRONIC KIDNEY DISEASE, WITHOUT LONG-TERM CURRENT USE OF INSULIN (H): Primary | ICD-10-CM

## 2025-02-06 DIAGNOSIS — F33.41 RECURRENT MAJOR DEPRESSIVE DISORDER, IN PARTIAL REMISSION: ICD-10-CM

## 2025-02-06 PROCEDURE — G2211 COMPLEX E/M VISIT ADD ON: HCPCS | Performed by: INTERNAL MEDICINE

## 2025-02-06 PROCEDURE — 99214 OFFICE O/P EST MOD 30 MIN: CPT | Performed by: INTERNAL MEDICINE

## 2025-02-06 ASSESSMENT — PATIENT HEALTH QUESTIONNAIRE - PHQ9
SUM OF ALL RESPONSES TO PHQ QUESTIONS 1-9: 0
SUM OF ALL RESPONSES TO PHQ QUESTIONS 1-9: 0
10. IF YOU CHECKED OFF ANY PROBLEMS, HOW DIFFICULT HAVE THESE PROBLEMS MADE IT FOR YOU TO DO YOUR WORK, TAKE CARE OF THINGS AT HOME, OR GET ALONG WITH OTHER PEOPLE: NOT DIFFICULT AT ALL

## 2025-02-06 NOTE — PROGRESS NOTES
"  {PROVIDER CHARTING PREFERENCE:570323}    Subjective   Savanna is a 84 year old, presenting for the following health issues:  Recheck Medication  {(!) Visit Details have not yet been documented.  Please enter Visit Details and then use this list to pull in documentation. (Optional):099347}  History of Present Illness       Reason for visit:  Medication   She is taking medications regularly.       {MA/LPN/RN Pre-Provider Visit Orders- hCG/UA/Strep (Optional):858962}  {SUPERLIST (Optional):567712}  {additonal problems for provider to add (Optional):819929}    {ROS Picklists (Optional):383583}      Objective    /84 (BP Location: Right arm, Patient Position: Sitting, Cuff Size: Adult Large)   Pulse 72   Temp 98  F (36.7  C) (Tympanic)   Resp 18   Ht 1.727 m (5' 8\")   Wt 104.8 kg (231 lb)   LMP  (LMP Unknown)   SpO2 94%   BMI 35.12 kg/m    Body mass index is 35.12 kg/m .  Physical Exam   {Exam List (Optional):059903}    {Diagnostic Test Results (Optional):061817}        Signed Electronically by: Taylor Payan MD  {Email feedback regarding this note to [email or web address]   :482551}  " "Sitting, Cuff Size: Adult Large)   Pulse 72   Temp 98  F (36.7  C) (Tympanic)   Resp 18   Ht 1.727 m (5' 8\")   Wt 104.8 kg (231 lb)   LMP  (LMP Unknown)   SpO2 94%   BMI 35.12 kg/m    Body mass index is 35.12 kg/m .  Physical Exam   GENERAL: alert and no distress  RESP: lungs clear to auscultation - no rales, rhonchi or wheezes  CV: regular rate and rhythm, normal S1 S2  MS: no gross musculoskeletal defects noted, no edema  NEURO: Normal strength and tone, mentation intact and speech normal  PSYCH: mentation appears normal, affect normal.            Signed Electronically by: Taylor Payan MD    "

## 2025-02-12 ENCOUNTER — TELEPHONE (OUTPATIENT)
Dept: INTERNAL MEDICINE | Facility: CLINIC | Age: 85
End: 2025-02-12
Payer: COMMERCIAL

## 2025-02-12 DIAGNOSIS — F41.9 ANXIETY: Primary | ICD-10-CM

## 2025-02-12 RX ORDER — HYDROXYZINE HYDROCHLORIDE 25 MG/1
25 TABLET, FILM COATED ORAL 3 TIMES DAILY PRN
Qty: 30 TABLET | Refills: 0 | Status: SHIPPED | OUTPATIENT
Start: 2025-02-12

## 2025-02-12 NOTE — TELEPHONE ENCOUNTER
Pt calls again. Asking for anxiety medication that won't make her tired. She is asking for something today. She is leaving town tomorrow for 3 day weekend.

## 2025-02-12 NOTE — TELEPHONE ENCOUNTER
"Advised patient of provider recommendation via telephone. Patient reports well she is will \"do nothing\" as taking a medication \"as needed\" multiple times a day is too hard and how will that help her stop crying so much. Patient reports that PCP recommended patient see Bradly and he will prescribe any medication for patient regarding concern; however, patient received a phone call from Bradly and was told that Bradly does would not prescribe medication and that should come from primary care. Writer explained that if it's behavioral health that patient is seeing, they are not a provider than can prescribe medication. Patient interrupted writer and said that bradly did not say say he \"can't\" but he \"won't\", \"it's not a matter of ability but that it should come from Dr. Payan\".     Writer tried to explain that the wording of situational sadness may have contributed to a PRN medication versus something more daily. Patient cut writer off and reports that PCP said \"Savanna I don't think you are depressed you are situationally sad because of what's going on around you.\" Patient then says \"I love people. I love my lord. I can't understand why all these people are dying.\" Writer apologized for not understanding the situation. Patient then asks writer to schedule patient with an appointment with PCP to discuss as something has been miscommunicated. Writer offered appointment tomorrow. Patient declined as she will be out of town. Soonest appointment that worked for patient schedule is next Wednesday.     Appointments in Next Year      Feb 18, 2025 9:00 AM  (Arrive by 8:55 AM)  [facility] NEW with JI Seals  [facility] ([facility] ) [phone]     Feb 19, 2025 9:30 AM  (Arrive by 9:25 AM)  Provider Visit with Taylor Payan MD  [facility] ([facility] ) [phone]       Summer CLIFFORD 5:40 PM February " 12, 2025   [facility]

## 2025-02-12 NOTE — TELEPHONE ENCOUNTER
Patient Returning Call    Reason for call:  Medications- Spoke to pt and she wanted to keep this personal and would only like to discuss with the provider or Care team.       Patient has additional questions:  No ?? Wanted to keep it all personal       Could we send this information to you in [employer] or would you prefer to receive a phone call?:   Patient would prefer a phone call   Okay to leave a detailed message?: Yes at Cell number on file:    Telephone Information:   Mobile [phone]

## 2025-02-12 NOTE — TELEPHONE ENCOUNTER
Hydroxyzine medication can be used as needed for anxiety.  Side effect profile does include increased sleepiness/drowsiness.  Rx completed

## 2025-02-12 NOTE — TELEPHONE ENCOUNTER
"Spoke with patient.  She had a phone visit with Bradly - a mental health specialist, on 2/10/25.  He does not want to prescribe an anti-anxiety for patient.  He is asking provider to do the prescribing.    Patient is asking if provider will prescribe something for her \"situational sadness\".  States she has not been crying as much.    Would like something that does not make her drowsy during the day.    Last office visit 2/6/25    Please advise, thanks.  "

## 2025-02-18 ENCOUNTER — VIRTUAL VISIT (OUTPATIENT)
Dept: BEHAVIORAL HEALTH | Facility: CLINIC | Age: 85
End: 2025-02-18
Payer: COMMERCIAL

## 2025-02-18 DIAGNOSIS — F43.22 ADJUSTMENT DISORDER WITH ANXIETY: Primary | ICD-10-CM

## 2025-02-18 PROCEDURE — 90832 PSYTX W PT 30 MINUTES: CPT | Mod: 95 | Performed by: MARRIAGE & FAMILY THERAPIST

## 2025-02-18 ASSESSMENT — COLUMBIA-SUICIDE SEVERITY RATING SCALE - C-SSRS
1. IN THE PAST MONTH, HAVE YOU WISHED YOU WERE DEAD OR WISHED YOU COULD GO TO SLEEP AND NOT WAKE UP?: NO
2. HAVE YOU ACTUALLY HAD ANY THOUGHTS OF KILLING YOURSELF?: NO
2. HAVE YOU ACTUALLY HAD ANY THOUGHTS OF KILLING YOURSELF?: NO
1. HAVE YOU WISHED YOU WERE DEAD OR WISHED YOU COULD GO TO SLEEP AND NOT WAKE UP?: NO
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
## 2025-02-19 ENCOUNTER — VIRTUAL VISIT (OUTPATIENT)
Dept: INTERNAL MEDICINE | Facility: CLINIC | Age: 85
End: 2025-02-19
Payer: COMMERCIAL

## 2025-02-19 DIAGNOSIS — F41.1 GAD (GENERALIZED ANXIETY DISORDER): ICD-10-CM

## 2025-02-19 DIAGNOSIS — E11.22 TYPE 2 DIABETES MELLITUS WITH STAGE 3A CHRONIC KIDNEY DISEASE, WITHOUT LONG-TERM CURRENT USE OF INSULIN (H): ICD-10-CM

## 2025-02-19 DIAGNOSIS — N18.31 TYPE 2 DIABETES MELLITUS WITH STAGE 3A CHRONIC KIDNEY DISEASE, WITHOUT LONG-TERM CURRENT USE OF INSULIN (H): ICD-10-CM

## 2025-02-19 DIAGNOSIS — F33.1 MODERATE EPISODE OF RECURRENT MAJOR DEPRESSIVE DISORDER (H): Primary | ICD-10-CM

## 2025-02-19 PROCEDURE — 98006 SYNCH AUDIO-VIDEO EST MOD 30: CPT | Performed by: INTERNAL MEDICINE
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
## 2025-02-23 ENCOUNTER — HOSPITAL ENCOUNTER (OUTPATIENT)
Dept: CT IMAGING | Facility: CLINIC | Age: 85
Discharge: HOME | End: 2025-02-23
Attending: INTERNAL MEDICINE | Admitting: INTERNAL MEDICINE
Payer: COMMERCIAL

## 2025-02-23 DIAGNOSIS — R10.32 LEFT LOWER QUADRANT ABDOMINAL PAIN: ICD-10-CM

## 2025-02-23 DIAGNOSIS — R19.4 CHANGE IN BOWEL HABIT: ICD-10-CM

## 2025-02-23 DIAGNOSIS — R14.0 ABDOMINAL DISTENSION: ICD-10-CM

## 2025-02-23 LAB
CREAT BLD-MCNC: 1.2 MG/DL (ref 0.5–1)
EGFRCR SERPLBLD CKD-EPI 2021: 45 ML/MIN/1.73M2

## 2025-02-23 PROCEDURE — 82565 ASSAY OF CREATININE: CPT

## 2025-02-23 PROCEDURE — 74177 CT ABD & PELVIS W/CONTRAST: CPT

## 2025-02-23 PROCEDURE — 250N000011 HC RX IP 250 OP 636: Performed by: INTERNAL MEDICINE

## 2025-02-23 RX ORDER — IOPAMIDOL 755 MG/ML
500 INJECTION, SOLUTION INTRAVASCULAR ONCE
Status: COMPLETED | OUTPATIENT
Start: 2025-02-23 | End: 2025-02-23

## 2025-02-23 RX ADMIN — IOPAMIDOL 100 ML: 755 INJECTION, SOLUTION INTRAVENOUS at 12:35

## 2025-02-25 ENCOUNTER — TELEPHONE (OUTPATIENT)
Dept: INTERNAL MEDICINE | Facility: CLINIC | Age: 85
End: 2025-02-25
Payer: COMMERCIAL

## 2025-02-25 DIAGNOSIS — G62.89 OTHER POLYNEUROPATHY: Primary | ICD-10-CM

## 2025-02-25 NOTE — TELEPHONE ENCOUNTER
Order/Referral Request    Who is requesting: patient    Orders being requested: Neurology      Reason service is needed/diagnosis:   Patient said her nephrology has now spread to her hands not just her feet, now that its in her fingers she is having a tough time doing things    When are orders needed by: ASAP    Has this been discussed with Provider: No    Does patient have a preference on a Group/Provider/Facility? [facility] preferred or [facility]    Does patient have an appointment scheduled?: No    Where to send orders: Place orders within Epic    Could we send this information to you in [facility] or would you prefer to receive a phone call?:   Patient would prefer a phone call   Okay to leave a detailed message?: Yes at Cell number on file:    Telephone Information:   Mobile [phone]

## 2025-02-26 NOTE — TELEPHONE ENCOUNTER
Patient calls to check to the status of her previous message and make sure it reached her Dr. She was assured it had and we are waiting for the MD to reply.      It was confirmed that the patient was referring to Neuropathy.

## 2025-02-26 NOTE — TELEPHONE ENCOUNTER
Please see message below.  This RN thinks the writer below was referring to neuropathy - patient now has neuropathy in her hands as well as in her feet.  Having a difficult time doing things requiring the use of her fingers.    Asking for a neurology referral.    Last virtual visit 2/19/25    Please advise, thanks.

## 2025-02-27 NOTE — TELEPHONE ENCOUNTER
Patient notified and expressed understanding.  Referral number given in the case they don't reach out to patient within 2 business days.

## 2025-03-04 ENCOUNTER — TELEPHONE (OUTPATIENT)
Dept: CARDIOLOGY | Facility: CLINIC | Age: 85
End: 2025-03-04
Payer: COMMERCIAL

## 2025-03-04 NOTE — TELEPHONE ENCOUNTER
Health Call Center    Phone Message    May a detailed message be left on voicemail: yes     Reason for Call: Other: Patient is wondering if Dr. Waller would make an exception to do a virtual visit 3/14/25 instead. No available options when trying to re-schedule . Please call her back to discuss.      Action Taken: Other: cardiology    Travel Screening: Not Applicable   Thank you!  Specialty Access Center      Date of Service:

## 2025-03-06 ENCOUNTER — TELEPHONE (OUTPATIENT)
Dept: INTERNAL MEDICINE | Facility: CLINIC | Age: 85
End: 2025-03-06
Payer: COMMERCIAL

## 2025-03-06 NOTE — LETTER
March 6, 2025    To  Savanna Rehman  [id number]   [place] [address]    Your team at [facility] cares about your health. We have reviewed your chart and based on our findings; we are making the following recommendations to better manage your health.     You are in particular need of attention regarding the following:     PREVENTATIVE VISIT: Annual Medicare Wellness:Schedule an Annual Medicare Wellness Exam. Please call your [facility] clinic to set up your appointment.    If you have already completed these items, please contact the clinic via phone or   [employer] so your care team can review and update your records. Thank you for   choosing [facility] for your healthcare needs. For any questions,   concerns, or to schedule an appointment please contact our clinic.    Healthy Regards,      Your [facility] Care Team            Electronically signed

## 2025-03-10 NOTE — TELEPHONE ENCOUNTER
[facility] Call Center    Phone Message    May a detailed message be left on voicemail: yes     Reason for Call: Other: Just a FYI, Patient called back stating they will like to keep appt as is and come in instead of a virtual, and disregard previous message on switching to virtual.      Action Taken: Other: Cardiology    Travel Screening: Not Applicable     Thank you!  Specialty Access Center

## 2025-03-11 ENCOUNTER — NURSE TRIAGE (OUTPATIENT)
Dept: INTERNAL MEDICINE | Facility: CLINIC | Age: 85
End: 2025-03-11
Payer: COMMERCIAL

## 2025-03-11 ENCOUNTER — TRANSFERRED RECORDS (OUTPATIENT)
Dept: HEALTH INFORMATION MANAGEMENT | Facility: CLINIC | Age: 85
End: 2025-03-11
Payer: COMMERCIAL

## 2025-03-11 ENCOUNTER — NURSE TRIAGE (OUTPATIENT)
Dept: NURSING | Facility: CLINIC | Age: 85
End: 2025-03-11
Payer: COMMERCIAL

## 2025-03-11 DIAGNOSIS — G43.009 MIGRAINE WITHOUT AURA AND WITHOUT STATUS MIGRAINOSUS, NOT INTRACTABLE: ICD-10-CM

## 2025-03-11 NOTE — TELEPHONE ENCOUNTER
Patient has taken  butalbital-aspirin-caffeine (FIORINAL) -40 MG capsule before. Routing to primary care provider to advise.    Thank you,  [place], Triage RN Raffi Oneal    3:17 PM 3/11/2025

## 2025-03-11 NOTE — TELEPHONE ENCOUNTER
General Call      Reason for Call:  migraine Rx    What are your questions or concerns:  patient c/o 3 day migraine and has taken her last 2 pills of this Rx but cannot recall the name of it. Writer does not see a migraine medication on her list.     Date of last appointment with provider: 2-    Could we send this information to you in [employer] or would you prefer to receive a phone call?:   Patient would prefer a phone call   Okay to leave a detailed message?: Yes at Home number on file [phone] (home)

## 2025-03-12 RX ORDER — BUTALBITAL, ASPIRIN, AND CAFFEINE 325; 50; 40 MG/1; MG/1; MG/1
1 CAPSULE ORAL EVERY 4 HOURS PRN
Qty: 10 CAPSULE | Refills: 0 | Status: CANCELLED
Start: 2025-03-12

## 2025-03-12 NOTE — TELEPHONE ENCOUNTER
Nurse Triage SBAR    Situation: Medication Request     Background: Patient calling. Patient declined triage. This is the patients second call for the medication.     Assessment: Patient is looking for a new prescription for Fiorinal.     Recommendation: Will attach to initial request and send it to PCP in initial encounter.     Aislinn Wild, RN Nursing Advisor 3/11/2025 7:42 PM     Reason for Disposition    Prescription request for new medicine (not a refill)    Protocols used: Medication Question Call-A-

## 2025-03-12 NOTE — TELEPHONE ENCOUNTER
[facility] Call Center    Phone Message    May a detailed message be left on voicemail: yes     Reason for Call: Other: Patient called stating they are having a bad migraine and was recommended by PCP to go to ER and thinking they may need to cancel appt with Dr. Waller on Friday. Next available appt in [place] is not until June, so wondering if their appt can be switched to virtual to discuss about some concerns they have instead of waiting. Patient is not too sure how virtual appt will work and how Dr. Waller can assess their heart condition, and will like some further clarifications on how that works if able to switch. Please call patient back to further discuss.      Action Taken: Other: Cardiology    Travel Screening: Not Applicable     Thank you!  Specialty Access Center

## 2025-03-12 NOTE — TELEPHONE ENCOUNTER
"Patient informed of primary care provider's message below.  States she does not have any way to go to the ED and does not want to call 911 due to cost.  Also does not want to sit in the ED for \"hours\" with a migraine.    She is asking for a refill on Fiorinal.  Needs to to be sent to the pharmacy by 12:00p today - daughter cannot get to the pharmacy after 12:00p.  Last fill 9/12/24 #10 tabs.    (States just saw the Ophthalmologist 1+ wks ago due to L eye felt swollen - and reports her \"eyes are healthy\" and was given a medication \"eye scrub\" and using refresh eye gtts.)    Please advise, thanks.  "

## 2025-03-12 NOTE — TELEPHONE ENCOUNTER
"S-(situation): migraine for 4 days     B-(background): has hx for migraines and really wants the Fiorinal sent to her pharmacy before noon as her  can't pick it up     A-(assessment):Had a black bar in her right eye then 12 hours later it disappeared then a had pain in her forehead, pressure in ears and neck pain   Pain is 9.75/10   States she cannot turn her head to her shoulders, eye pain and they burn too,       Denies: fevers,cough, congestion, nausea, vomiting      R-(recommendations): pt should be seen     Pt stated that she can't go in and knows what will help with the migraine and she just needs it and she should not be waiting as long as she has for an answer to help with the migraine \"no one cares about the elderly\" - RN advised she will send a message to her provider and the providers care team to help with follow up     RN also assured her that we do care about all of our pts     Patient stated an understanding and agreed with plan.    Please review and advise   Thank you     Marce Chawla RN, BSN  [facility] Triage      "

## 2025-03-12 NOTE — TELEPHONE ENCOUNTER
"Patient informed of provider's message below.  Patient states provider's response \"is cruel and inhumane\".  She has no transportation to get to the ED and cannot afford to call 911 to take her.    "

## 2025-03-12 NOTE — TELEPHONE ENCOUNTER
"Reason for Disposition    Patient sounds very sick or weak to the triager    Additional Information    Negative: Difficult to awaken or acting confused (e.g., disoriented, slurred speech)    Negative: Weakness of the face, arm or leg on one side of the body and new-onset    Negative: Numbness of the face, arm or leg on one side of the body and new-onset    Negative: Loss of speech or garbled speech and new-onset    Negative: Passed out (e.g., fainted, lost consciousness, blacked out and was not responding)    Negative: Sounds like a life-threatening emergency to the triager    Negative: Followed a head injury within last 3 days    Negative: Traumatic Brain Injury (TBI) is suspected    Negative: Sinus pain or congestion is main symptom(s)    Negative: Influenza suspected (i.e., cough, fever, other respiratory symptoms; probable influenza exposure)    Negative: Pregnant    Negative: Unable to walk without falling    Negative: Other family members (or people in same household) with headaches and possibility of carbon monoxide exposure    Negative: Stiff neck (can't touch chin to chest)    Negative: Fever > 103 F (39.4 C)    Negative: Fever > 100 F (37.8 C) and has diabetes mellitus or a weak immune system (e.g., HIV positive, cancer chemotherapy, organ transplant, splenectomy, chronic steroids)    Negative: SEVERE headache (e.g., excruciating) and has had severe headaches before    Negative: SEVERE headache and not relieved by pain meds    Negative: SEVERE headache and vomiting    Negative: SEVERE headache and fever    Negative: New-onset headache and weak immune system (e.g., HIV positive, cancer chemo, splenectomy, organ transplant, chronic steroids)    Negative: Fever present > 3 days (72 hours)    Negative: Patient wants to be seen    Negative: MODERATE headache (e.g., interferes with normal activities) present > 24 hours and unexplained    Answer Assessment - Initial Assessment Questions  1. LOCATION: \"Where does " "it hurt?\"       Had a black bar in her right eye then 12 hours later it disappeared then a had pain in her forehead, pressure in ears and neck pain     2. ONSET: \"When did the headache start?\" (e.g., minutes, hours, days)       4 days ago     3. PATTERN: \"Does the pain come and go, or has it been constant since it started?\"       Constant     4. SEVERITY: \"How bad is the pain?\" and \"What does it keep you from doing?\"  (e.g., Scale 1-10; mild, moderate, or severe)      9.75/10     5. RECURRENT SYMPTOM: \"Have you ever had headaches before?\" If Yes, ask: \"When was the last time?\" and \"What happened that time?\"       Yes - history of migraines     6. CAUSE: \"What do you think is causing the headache?\"      Unsure     7. MIGRAINE: \"Have you been diagnosed with migraine headaches?\" If Yes, ask: \"Is this headache similar?\"       Yes it is the same as before     8. HEAD INJURY: \"Has there been any recent injury to the head?\"       None     9. OTHER SYMPTOMS: \"Do you have any other symptoms?\" (e.g., fever, stiff neck, eye pain, sore throat, cold symptoms)  States she cannot turn her head to her shoulders, eye pain and they burn too,       Denies: fevers,cough, congestion, nausea, vomiting      10. PREGNANCY: \"Is there any chance you are pregnant?\" \"When was your last menstrual period?\"        NA    Protocols used: Headache-A-OH    "

## 2025-03-13 ENCOUNTER — NURSE TRIAGE (OUTPATIENT)
Dept: NURSING | Facility: CLINIC | Age: 85
End: 2025-03-13
Payer: COMMERCIAL

## 2025-03-13 NOTE — TELEPHONE ENCOUNTER
Routed to Dr. Lauren.    Please see messages below.    Please advise regarding Fiorinal refill, thanks.

## 2025-03-13 NOTE — TELEPHONE ENCOUNTER
"Patient calls again, has called numerous times requesting a prescription for Fiorinal for her ongoing migraine for 5 days. She knows she was informed to go to the ED. She states she cannot go to the ED it is too expensive. She request this refill to be sent to her previous PCP Dr. Lauren. She also requests a call back from a Nurse on Dr. Lauren's team, one way or another. She calls her current PCP is \" cruel and inhumane.\"  "

## 2025-03-14 NOTE — TELEPHONE ENCOUNTER
"Nurse Triage SBAR    Is this a 2nd Level Triage? NO    Situation: migraine    Background: Migraines, diabetic. Has had migraines since she was a teenager and has used Fiorinal for years, last prescribed 9-12-24.   Neuropathy in both feet, numbness in pads of right fingertips.     Assessment: Headache rating 8/10, across forehead, back of head.  Trying caffeine soda, Tylenol. Denies new neuro symptoms.     Protocol Recommended Disposition:   Go to ED Now (Or PCP Triage)    Recommendation: Patient verbalizes understanding but refuses ED, saying she can't get there and wouldn't tolerate it, and ambulance is too expensive.  Patient discouraged as she has been calling for medication all week. Encouraged patient that a red flag message has been sent to clinic.    Maci Dallas RN  [facility]       Reason for Disposition   [1] SEVERE headache (e.g., excruciating) AND [2] \"worst headache\" of life    Additional Information   Negative: Difficult to awaken or acting confused (e.g., disoriented, slurred speech)   Negative: [1] Weakness of the face, arm or leg on one side of the body AND [2] new-onset   Negative: [1] Numbness of the face, arm or leg on one side of the body AND [2] new-onset   Negative: [1] Loss of speech or garbled speech AND [2] new-onset   Negative: Passed out (i.e., lost consciousness, collapsed and was not responding)   Negative: Sounds like a life-threatening emergency to the triager   Negative: Followed a head injury   Negative: Unable to walk, or can only walk with assistance (e.g., requires support)   Negative: Stiff neck (can't touch chin to chest)   Negative: Severe pain in one eye   Negative: [1] Other family members (or people in same household) with headaches AND [2] possibility of carbon monoxide exposure    Protocols used: Headache-A-AH    "

## 2025-03-14 NOTE — TELEPHONE ENCOUNTER
"Nurse Triage SBAR    Is this a 2nd Level Triage? NO    Situation: Patient calling back regarding her request for Fiorinal for her migraine that has continued for six days now.  Patient very discouraged that she has not gotten help though she has called in all week.  \"I'm not calling in anymore.  It's cruel and inhumane.\"    Background: Has had migraines since she was a teenager and has used Fiorinal for years, last prescribed 9-12-24.   Neuropathy in both feet, numbness in pads of right fingertips.     Assessment: Headache rating 8/10, across forehead, back of head.  Trying caffeine soda, Tylenol. Denies new neuro symptoms.     Protocol Recommended Disposition: To ED now or PCP triage    Recommendation: Patient says she cannot get to ED and can't afford an ambulance.  She wouldn't tolerate sitting for hours, and refuses ED.  Please see triage encounter 3-13-25.    Will add to conversation that has been red flagged to Dr. Lauren.    Maci Dallas RN  [facility]        "

## 2025-03-16 ENCOUNTER — APPOINTMENT (OUTPATIENT)
Dept: CT IMAGING | Facility: CLINIC | Age: 85
End: 2025-03-16
Attending: EMERGENCY MEDICINE
Payer: COMMERCIAL

## 2025-03-16 ENCOUNTER — NURSE TRIAGE (OUTPATIENT)
Dept: NURSING | Facility: CLINIC | Age: 85
End: 2025-03-16
Payer: COMMERCIAL

## 2025-03-16 ENCOUNTER — HOSPITAL ENCOUNTER (EMERGENCY)
Facility: CLINIC | Age: 85
Discharge: HOME | End: 2025-03-16
Attending: EMERGENCY MEDICINE | Admitting: EMERGENCY MEDICINE
Payer: COMMERCIAL

## 2025-03-16 VITALS
HEART RATE: 95 BPM | DIASTOLIC BLOOD PRESSURE: 92 MMHG | BODY MASS INDEX: 37.23 KG/M2 | HEIGHT: 67 IN | OXYGEN SATURATION: 94 % | SYSTOLIC BLOOD PRESSURE: 159 MMHG | WEIGHT: 237.22 LBS | TEMPERATURE: 97.6 F | RESPIRATION RATE: 18 BRPM

## 2025-03-16 DIAGNOSIS — R51.9 ACUTE NONINTRACTABLE HEADACHE, UNSPECIFIED HEADACHE TYPE: ICD-10-CM

## 2025-03-16 PROCEDURE — 99284 EMERGENCY DEPT VISIT MOD MDM: CPT | Mod: 25

## 2025-03-16 PROCEDURE — 250N000013 HC RX MED GY IP 250 OP 250 PS 637: Performed by: EMERGENCY MEDICINE

## 2025-03-16 PROCEDURE — 70450 CT HEAD/BRAIN W/O DYE: CPT

## 2025-03-16 RX ORDER — METOCLOPRAMIDE 5 MG/1
5 TABLET ORAL
Qty: 10 TABLET | Refills: 0 | Status: SHIPPED | OUTPATIENT
Start: 2025-03-16

## 2025-03-16 RX ORDER — BUTALBITAL, ACETAMINOPHEN AND CAFFEINE 50; 325; 40 MG/1; MG/1; MG/1
2 TABLET ORAL ONCE
Status: COMPLETED | OUTPATIENT
Start: 2025-03-16 | End: 2025-03-16

## 2025-03-16 RX ORDER — TIZANIDINE 2 MG/1
2 TABLET ORAL 3 TIMES DAILY
Qty: 20 TABLET | Refills: 0 | Status: SHIPPED | OUTPATIENT
Start: 2025-03-16

## 2025-03-16 RX ORDER — BUTALBITAL, ACETAMINOPHEN AND CAFFEINE 50; 325; 40 MG/1; MG/1; MG/1
1 TABLET ORAL EVERY 4 HOURS PRN
Qty: 20 TABLET | Refills: 0 | Status: SHIPPED | OUTPATIENT
Start: 2025-03-16

## 2025-03-16 RX ADMIN — BUTALBITAL, ACETAMINOPHEN, AND CAFFEINE 2 TABLET: 50; 325; 40 TABLET ORAL at 12:52

## 2025-03-16 ASSESSMENT — ACTIVITIES OF DAILY LIVING (ADL)
ADLS_ACUITY_SCORE: 57
ADLS_ACUITY_SCORE: 57

## 2025-03-16 ASSESSMENT — COLUMBIA-SUICIDE SEVERITY RATING SCALE - C-SSRS
1. IN THE PAST MONTH, HAVE YOU WISHED YOU WERE DEAD OR WISHED YOU COULD GO TO SLEEP AND NOT WAKE UP?: NO
6. HAVE YOU EVER DONE ANYTHING, STARTED TO DO ANYTHING, OR PREPARED TO DO ANYTHING TO END YOUR LIFE?: NO
2. HAVE YOU ACTUALLY HAD ANY THOUGHTS OF KILLING YOURSELF IN THE PAST MONTH?: NO

## 2025-03-16 NOTE — DISCHARGE INSTRUCTIONS
We have done a head CT due to the headache and your use of anticoagulation and this was negative.  We are refilling your Fioricet medication as well as offering nausea medication for headache as well as muscle relaxant for neck pain.  Return with fever severe intractable system symptoms persistent vomiting or other concerns.  Thanks for your patience today.

## 2025-03-16 NOTE — ED PROVIDER NOTES
"  Emergency Department Note      History of Present Illness     Chief Complaint   Headache    HPI   Savanna Rehman is a 84 year old female on [facility] with a history of CAD, diabetes, gout, congestive heart failure, migraine and atrial fibrillation presenting with headache. The patient states she had been having a constant headache for the past ten days (3/06/25). The headache started while she was watching CNN and the pain is located above her eyebrows. The patient is currently experiencing light sensitivity and cannot turn her neck completely. She has had no nausea or vomiting. Savanna was seen in urgent care on Friday (3/14/25) for her headache and was given prednisone. She took prednisone yesterday, which did not help her pain. The patient has not had a migraine since August 2024. Savanna was diagnosed with situational sadness in January 2025.     Independent Historian   Daughter as detailed above.    Review of External Notes   Negative angiogram in 2018.     Past Medical History     Medical History and Problem List   Acute encephalopathy  Anemia  Atrial fibrillation   CAD   Chronic pain  Congestive heart failure   Degenerative disk disease  Diabetes   Fibromyalgia  Gastro-oesophageal reflux disease  Gout  Hiatal hernia  Mumps  Neuropathy  CRISTIANA   Pernicious anemia  Sleep apnea  Urinary incontinence  Vitamin D deficiency      Medications   Apixaban   Epinephrine   Furosemide   Glipizide  Hydroxyzine   Loperamide   Metoprolol succinate     Surgical History   Appendectomy   Cholecystectomy   Colonoscopy 3x   Coronary angiography   Cystoscopy bladder  Pacemaker implant right atrial   Heart cath   Hysterectomy total   Knee replacement, left   Tonsillectomy   Transposition ulnar nerve    Physical Exam     Patient Vitals for the past 24 hrs:   BP Temp Temp src Pulse Resp SpO2 Height Weight   03/16/25 1232 (!) 182/95 97.6  F (36.4  C) Temporal 95 18 94 % 1.702 m (5' 7\") 107.6 kg (237 lb 3.4 oz)     Physical Exam  Vitals " reviewed.   HENT:      Head: Normocephalic.      Mouth/Throat:      Mouth: Mucous membranes are moist.   Eyes:      Pupils: Pupils are equal, round, and reactive to light.   Cardiovascular:      Rate and Rhythm: Normal rate.   Pulmonary:      Effort: Pulmonary effort is normal.   Abdominal:      General: Abdomen is flat. Bowel sounds are normal.   Musculoskeletal:         General: Normal range of motion.   Skin:     General: Skin is warm.      Capillary Refill: Capillary refill takes less than 2 seconds.   Neurological:      General: No focal deficit present.      Mental Status: She is alert.   Psychiatric:         Mood and Affect: Mood normal.           Diagnostics     Lab Results   Labs Ordered and Resulted from Time of ED Arrival to Time of ED Departure - No data to display    Imaging   Head CT w/o contrast   Final Result   IMPRESSION:   1.  No acute intracranial process.   2.  Chronic changes as above.           Independent Interpretation   CT Head: No intracranial hemorrhage.    ED Course      Medications Administered   Medications - No data to display    Procedures   Procedures     Discussion of Management   None    ED Course   ED Course as of 03/16/25 1452   Sun Mar 16, 2025   1238 I obtained the history and examined the patient as noted above.      1452 I disscussed dicharge instructions, patient is comfortable with discharge.        Additional Documentation  None    Medical Decision Making / Diagnosis     CMS Diagnoses: None    MIPS       None    MDM   Savanna Rehman is a 84 year old female presents with ongoing headache.  Describes a history of migraines.  Not for a while.  Is on anticoagulation.  CT of the head performed due to anticoagulant use and head pain.  No signs of bleed mass or shift.  Patient symptoms are highly suspicious of migraine due to her history of similar patient is very well-appearing chatting with her daughter and does not seem to be in any pain at all.  Offered refill of her  Fioricet and medications for neck pain and follow-up with primary care.  Was form of neck pain with her headache no concerns for meningitis due to lack of infectious symptoms.    Disposition   The patient was discharged.     Diagnosis     ICD-10-CM    1. Acute nonintractable headache, unspecified headache type  R51.9            Discharge Medications   Discharge Medication List as of 3/16/2025  2:50 PM        START taking these medications    Details   butalbital-acetaminophen-caffeine (ESGIC) -40 MG tablet Take 1 tablet by mouth every 4 hours as needed for headaches., Disp-20 tablet, R-0, Local Print      metoclopramide (REGLAN) 5 MG tablet Take 1 tablet (5 mg) by mouth 4 times daily (before meals and nightly)., Disp-10 tablet, R-0, Local Print      tiZANidine (ZANAFLEX) 2 MG tablet Take 1 tablet (2 mg) by mouth 3 times daily., Disp-20 tablet, R-0, Local Print               Scribe Disclosure:  I, Flower Howard, am serving as a scribe at 12:38 PM on 3/16/2025 to document services personally performed by Dwayne Her MD based on my observations and the provider's statements to me.        Dwayne Her MD  03/16/25 1657

## 2025-03-16 NOTE — ED TRIAGE NOTES
"Pt presents to ED on day 10 of migraine. Went to  Friday and prescribed Prednisone but stopped taking it because she \"can't sleep.\" Pt on Eliquis.      Triage Assessment (Adult)       Row Name 03/16/25 1231          Triage Assessment    Airway WDL WDL        Respiratory WDL    Respiratory WDL WDL        Skin Circulation/Temperature WDL    Skin Circulation/Temperature WDL WDL        Cardiac WDL    Cardiac WDL WDL        Peripheral/Neurovascular WDL    Peripheral Neurovascular WDL WDL        Cognitive/Neuro/Behavioral WDL    Cognitive/Neuro/Behavioral WDL WDL                     "

## 2025-03-16 NOTE — TELEPHONE ENCOUNTER
Nurse Triage SBAR    Is this a 2nd Level Triage? NO    Situation: Pt is calling with a migraine     Background: Pt is calling with a migraine     Assessment: Pt is calling with a migraine that has been going on for the past 10 days - pt wants an Rx for Fiorinal     Pt states that she has not slept in 24 hours     Rates headache 8/10    Writer can see that a request was sent to provider and and that provider advised pt to be seen in ED on 03/14/2025    Protocol Recommended Disposition: See PCP Within 24 Hours   No disposition on file.    Recommendation: See PCP Within 24 Hours - pt is not wanting to be seen at urgent care or ED      Care advice given per protocol and when to call back. Pt verbalized understanding and agrees to plan of care.    Yumi Beckford RN  [facility]  10:49 AM 3/16/2025    Reason for Disposition   [1] MODERATE headache (e.g., interferes with normal activities) AND [2] present > 24 hours AND [3] unexplained  (Exceptions: analgesics not tried, typical migraine, or headache part of viral illness)    Additional Information   Negative: Difficult to awaken or acting confused (e.g., disoriented, slurred speech)   Negative: [1] Weakness of the face, arm or leg on one side of the body AND [2] new-onset   Negative: [1] Numbness of the face, arm or leg on one side of the body AND [2] new-onset   Negative: [1] Loss of speech or garbled speech AND [2] new-onset   Negative: Passed out (i.e., lost consciousness, collapsed and was not responding)   Negative: Sounds like a life-threatening emergency to the triager   Negative: Followed a head injury   Negative: Pregnant   Negative: Postpartum (from 0 to 6 weeks after delivery)   Negative: Traumatic Brain Injury (TBI) is suspected   Negative: Unable to walk, or can only walk with assistance (e.g., requires support)   Negative: Stiff neck (can't touch chin to chest)   Negative: Severe pain in one eye   Negative: [1] Other family members (or people  "in same household) with headaches AND [2] possibility of carbon monoxide exposure   Negative: [1] SEVERE headache (e.g., excruciating) AND [2] \"worst headache\" of life   Negative: [1] SEVERE headache AND [2] sudden-onset (i.e., reaching maximum intensity within seconds to 1 hour)   Negative: [1] SEVERE headache AND [2] fever   Negative: Loss of vision or double vision  (Exception: Same as prior migraines.)   Negative: [1] Fever > 100.0 F (37.8 C) AND [2] diabetes mellitus or weak immune system (e.g., HIV positive, cancer chemo, splenectomy, organ transplant, chronic steroids)   Negative: Patient sounds very sick or weak to the triager   Negative: [1] SEVERE headache (e.g., excruciating) AND [2] not improved after 2 hours of pain medicine   Negative: [1] Vomiting AND [2] 2 or more times  (Exception: Similar to previous migraines.)   Negative: Fever > 104 F (40 C)    Protocols used: Headache-A-AH    "

## 2025-03-22 ENCOUNTER — NURSE TRIAGE (OUTPATIENT)
Dept: NURSING | Facility: CLINIC | Age: 85
End: 2025-03-22
Payer: COMMERCIAL

## 2025-03-22 NOTE — TELEPHONE ENCOUNTER
"Nurse Triage SBAR    Is this a 2nd Level Triage? NO    Situation: Headache.  \"I only have two Fiorinal left.\"    Background: Patient with long history of migraines with vision disturbances. In ED 3-16-25.    Assessment: White line is now in right eye and headache 7-8/10.Denies acute neuro symptoms.    Protocol Recommended Disposition:   See PCP Within 3 Days    Recommendation: Patient verbalizes understanding of advice and will call clinic for appointment     Maci Dallas RN  [facility]    Reason for Disposition   [1] MILD-MODERATE headache AND [2] present > 72 hours    Additional Information   Negative: Difficult to awaken or acting confused (e.g., disoriented, slurred speech)   Negative: [1] Weakness of the face, arm or leg on one side of the body AND [2] new-onset   Negative: [1] Numbness of the face, arm or leg on one side of the body AND [2] new-onset   Negative: [1] Loss of speech or garbled speech AND [2] new-onset   Negative: Passed out (i.e., lost consciousness, collapsed and was not responding)   Negative: Sounds like a life-threatening emergency to the triager   Negative: Unable to walk, or can only walk with assistance (e.g., requires support)   Negative: Stiff neck (can't touch chin to chest)   Negative: Severe pain in one eye   Negative: [1] Other family members (or people in same household) with headaches AND [2] possibility of carbon monoxide exposure   Negative: [1] SEVERE headache (e.g., excruciating) AND [2] \"worst headache\" of life   Negative: [1] SEVERE headache AND [2] sudden-onset (i.e., reaching maximum intensity within seconds to 1 hour)   Negative: [1] SEVERE headache AND [2] fever   Negative: Loss of vision or double vision  (Exception: Same as prior migraines.)   Negative: [1] Fever > 100.0 F (37.8 C) AND [2] diabetes mellitus or weak immune system (e.g., HIV positive, cancer chemo, splenectomy, organ transplant, chronic steroids)   Negative: Patient sounds very sick or " weak to the triager   Negative: [1] SEVERE headache (e.g., excruciating) AND [2] not improved after 2 hours of pain medicine   Negative: [1] Vomiting AND [2] 2 or more times  (Exception: Similar to previous migraines.)   Negative: Fever > 104 F (40 C)   Negative: [1] MODERATE headache (e.g., interferes with normal activities) AND [2] present > 24 hours AND [3] unexplained  (Exceptions: analgesics not tried, typical migraine, or headache part of viral illness)   Negative: [1] New headache AND [2] weak immune system (e.g., HIV positive, cancer chemo, splenectomy, organ transplant, chronic steroids)   Negative: [1] New headache AND [2] age > 50   Negative: [1] Sinus pain of forehead AND [2] yellow or green nasal discharge   Negative: Fever present > 3 days (72 hours)    Protocols used: Headache-A-AH

## 2025-03-25 ENCOUNTER — TELEPHONE (OUTPATIENT)
Dept: INTERNAL MEDICINE | Facility: CLINIC | Age: 85
End: 2025-03-25
Payer: COMMERCIAL

## 2025-03-25 DIAGNOSIS — G43.009 MIGRAINE WITHOUT AURA AND WITHOUT STATUS MIGRAINOSUS, NOT INTRACTABLE: Primary | ICD-10-CM

## 2025-03-25 RX ORDER — BUTALBITAL, ASPIRIN, AND CAFFEINE 325; 50; 40 MG/1; MG/1; MG/1
1 CAPSULE ORAL EVERY 8 HOURS PRN
Qty: 30 CAPSULE | Refills: 0 | Status: SHIPPED | OUTPATIENT
Start: 2025-03-25

## 2025-03-25 NOTE — TELEPHONE ENCOUNTER
"Call received from patient stating she has had a migraine for 22-23 days. Patient was scheduled to see Leena Khan tomorrow but Leena will be out of the office.    States she has used 20 Fiorinal tablets and they have not helped. States the tablets she received are not the same as what she usually gets and they aren't helping. States she has a \"stuck migraine\".     Writer notes the last prescription patient received for migraines was for butalbital-acetaminophen-caffeine instead of the Fiorinal she usually gets.    Patient initially called regarding migraine on 3/11/25 and was requesting a Fiorinal refill. Patient was advised to be seen in ER due to the severity of her headache. Patient was seen in a [facility] urgent care 3/14/25 and was seen in ER 3/16/25. Patient states she was advised to follow up with her primary care provider within 3 days. This is the appointment that was scheduled tomorrow that was cancelled due to provider being out.    Patient tearful because she has had this migraine for so long and can't get any relief. Patient frustrated because she says she is trying to do what she is supposed to do and she went to the ER like she was supposed to. She was supposed to schedule a follow up appointment which she did but now it was cancelled. Advised patient we need to reschedule the follow up. Patient agrees. Rescheduled with Leena Khan 3/27/25. Patient asking if she can get a prescription for her regular Fiorinal to last her until the follow up appointment on 3/27/25.   "

## 2025-03-25 NOTE — TELEPHONE ENCOUNTER
Discussed with Dr. Lauren. Patient has aspirin listed as an allergy. Call to patient. She can take aspirin and asks that this be removed from her allergy list. Dr. Lauren also suggested a Prednisone burst. Patient states she took one dose of Prednisone with food on 3/14/25 but it upset her stomach so she didn't take any more. This was prescribed at [facility] urgent care. Patient will try again.

## 2025-03-27 ENCOUNTER — OFFICE VISIT (OUTPATIENT)
Dept: INTERNAL MEDICINE | Facility: CLINIC | Age: 85
End: 2025-03-27
Payer: COMMERCIAL

## 2025-03-27 VITALS
HEART RATE: 84 BPM | BODY MASS INDEX: 37.04 KG/M2 | DIASTOLIC BLOOD PRESSURE: 89 MMHG | OXYGEN SATURATION: 97 % | HEIGHT: 67 IN | WEIGHT: 236 LBS | SYSTOLIC BLOOD PRESSURE: 135 MMHG | RESPIRATION RATE: 20 BRPM | TEMPERATURE: 97.4 F

## 2025-03-27 DIAGNOSIS — E11.22 TYPE 2 DIABETES MELLITUS WITH STAGE 3A CHRONIC KIDNEY DISEASE, WITHOUT LONG-TERM CURRENT USE OF INSULIN (H): ICD-10-CM

## 2025-03-27 DIAGNOSIS — G44.209 TENSION HEADACHE: Primary | ICD-10-CM

## 2025-03-27 DIAGNOSIS — N18.31 TYPE 2 DIABETES MELLITUS WITH STAGE 3A CHRONIC KIDNEY DISEASE, WITHOUT LONG-TERM CURRENT USE OF INSULIN (H): ICD-10-CM

## 2025-03-27 DIAGNOSIS — N18.32 STAGE 3B CHRONIC KIDNEY DISEASE (H): ICD-10-CM

## 2025-03-27 ASSESSMENT — PAIN SCALES - GENERAL: PAINLEVEL_OUTOF10: NO PAIN (0)

## 2025-03-27 NOTE — PROGRESS NOTES
"  Assessment & Plan     Tension headache  Patients headaches have improved. She states she will be able to start her medication today. I advised to start the medication today and to focus on drinking more water throughout the day. With no improvement with these changes in 2 weeks, she can return to the clinic for further workup.     Type 2 diabetes mellitus with stage 3a chronic kidney disease, without long-term current use of insulin (H)  Stable. Follow-up with PCP and endocrine as advised.     Stage 3b chronic kidney disease (H)  Stable. Follow-up with PCP as advised.     The longitudinal plan of care for the diagnosis(es)/condition(s) as documented were addressed during this visit. Due to the added complexity in care, I will continue to support Savanna in the subsequent management and with ongoing continuity of care.      32 minutes spent by me on the date of the encounter doing chart review, patient visit, and documentation         MED REC REQUIRED  Post Medication Reconciliation Status: discharge medications reconciled, continue medications without change  BMI  Estimated body mass index is 36.96 kg/m  as calculated from the following:    Height as of this encounter: 1.702 m (5' 7\").    Weight as of this encounter: 107 kg (236 lb).   Weight management plan: Patient was referred to their PCP to discuss a diet and exercise plan.          Maamdou Corrales is a 84 year old, presenting for the following health issues:  Patient is here today for an ER follow-up for headaches.   She has been waiting on medications to be filled but they just got it filled today.   She thinks she has an eye infection.   When she covered the left eye, she states it feels like somebody is blowing smoke into her right eye.   She states the headache is getting better.  She states she started to feel this stress while watching CNN. She thought she popped an aneurysm but then realized it was just a migraine.   Water intake: she knows she " "doesn't drink enough.   She stopped drinking diet coke.       ER F/U    HPI        ED/UC Followup:    Facility:  [facility]  Date of visit: 3/16/25  Reason for visit: headache  Current Status: stable        Review of Systems  Constitutional, HEENT, cardiovascular, pulmonary, gi and gu systems are negative, except as otherwise noted.      Objective    BP (!) 136/91   Pulse 84   Temp 97.4  F (36.3  C)   Resp 20   Ht 1.702 m (5' 7\")   Wt 107 kg (236 lb)   LMP  (LMP Unknown)   SpO2 97%   Breastfeeding No   BMI 36.96 kg/m    Body mass index is 36.96 kg/m .  Physical Exam   GENERAL: alert and no distress  NECK: no adenopathy, no asymmetry, masses, or scars  RESP: lungs clear to auscultation - no rales, rhonchi or wheezes  CV: regular rate and rhythm, normal S1 S2, no S3 or S4, no murmur, click or rub, no peripheral edema  ABDOMEN: soft, nontender, no hepatosplenomegaly, no masses and bowel sounds normal  MS: no gross musculoskeletal defects noted, no edema            Signed Electronically by: BATSHEVA Ann CNP    "

## 2025-03-31 ENCOUNTER — TELEPHONE (OUTPATIENT)
Dept: INTERNAL MEDICINE | Facility: CLINIC | Age: 85
End: 2025-03-31
Payer: COMMERCIAL

## 2025-03-31 NOTE — TELEPHONE ENCOUNTER
"Pt called stating she had been talking with \"Shasta\" the triage nurse. Writer advised there is a Alyssa but I was unable to locate an encounter about their conversation. Per Pt writer was unable to help patient. Writer messaged Alyssa and she will call her.    Barrera Oconnor RN [place] Triage    "

## 2025-04-01 ENCOUNTER — TELEPHONE (OUTPATIENT)
Dept: FAMILY MEDICINE | Facility: CLINIC | Age: 85
End: 2025-04-01
Payer: COMMERCIAL

## 2025-04-01 NOTE — TELEPHONE ENCOUNTER
RN received incoming call from patient     Patient states that she goes to the [facility] for care however comes to drop off urine samples at the [facility]     Patient wondering if she has a standing order to drop off a urine sample   Per RN chart review, there is not an order for UA to be dropped off     RN advised that there were multiple open orders however none for a UA.   Patient asked what type of open orders she had and RN started to read through the long list, patient then she stated no I dont need all that and did  not let RN finish the list     RN explained that a visit would be needed to obtain an order     RN asked patient if she was able to use her my chart to explain how to send an Evisit     Patient stated I will end this call now good bye and ended call     Lucy Castro, Registered Nurse  [facility]

## 2025-04-02 ENCOUNTER — VIRTUAL VISIT (OUTPATIENT)
Dept: INTERNAL MEDICINE | Facility: CLINIC | Age: 85
End: 2025-04-02
Payer: COMMERCIAL

## 2025-04-02 DIAGNOSIS — R35.0 URINARY FREQUENCY: Primary | ICD-10-CM

## 2025-04-02 PROCEDURE — 98005 SYNCH AUDIO-VIDEO EST LOW 20: CPT

## 2025-04-02 NOTE — PROGRESS NOTES
Savanna is a 84 year old who is being evaluated via a billable video visit.    How would you like to obtain your AVS? MyChart  If the video visit is dropped, the invitation should be resent by: Text to cell phone: [phone]  Will anyone else be joining your video visit? No      Assessment & Plan     Urinary frequency  Patient presents via video visit for a complaint of Urinary frequency, dysuria, and has a history of UTI. Lab pending. Will treat as needed.   - UA Macroscopic with reflex to Microscopic and Culture - Lab Collect; Future      20 minutes spent by me on the date of the encounter doing chart review, review of test results, interpretation of tests, patient visit, and documentation             Subjective   Savanna is a 84 year old, presenting for the following health issues:  No chief complaint on file.        3/27/2025    12:51 PM   Additional Questions   Roomed by BALBINA Paez       Video Start Time:  8:38am     HPI      Genitourinary - Female  Onset/Duration: Two days ago  Description:   Painful urination (Dysuria): YES           Frequency: YES  Blood in urine (Hematuria): No  Delay in urine (Hesitency): No  Intensity: mild  Progression of Symptoms:  same  Accompanying Signs & Symptoms:  Fever/chills: No  Flank pain: No  Nausea and vomiting: No  Vaginal symptoms: none  Abdominal/Pelvic Pain: No  History:   History of frequent UTI s: YES  History of kidney stones: No  Sexually Active: No  Possibility of pregnancy: No  Precipitating or alleviating factors: None  Therapies tried and outcome:        Review of Systems  Constitutional, HEENT, cardiovascular, pulmonary, gi and gu systems are negative, except as otherwise noted.      Objective           Vitals:  No vitals were obtained today due to virtual visit.    Physical Exam   GENERAL: alert and no distress  EYES: Eyes grossly normal to inspection.  No discharge or erythema, or obvious scleral/conjunctival abnormalities.  RESP: No audible wheeze, cough, or  visible cyanosis.    SKIN: Visible skin clear. No significant rash, abnormal pigmentation or lesions.  NEURO: Cranial nerves grossly intact.  Mentation and speech appropriate for age.  PSYCH: Appropriate affect, tone, and pace of words          Video-Visit Details    Type of service:  Video Visit   Video End Time: 9:01 am   Originating Location (pt. Location): Home    Distant Location (provider location):  On-site  Platform used for Video Visit: Doximity  Signed Electronically by: BATSHEVA Ann CNP

## 2025-04-03 ENCOUNTER — TELEPHONE (OUTPATIENT)
Dept: INTERNAL MEDICINE | Facility: CLINIC | Age: 85
End: 2025-04-03

## 2025-04-03 ENCOUNTER — LAB (OUTPATIENT)
Dept: LAB | Facility: CLINIC | Age: 85
End: 2025-04-03
Payer: COMMERCIAL

## 2025-04-03 DIAGNOSIS — D50.8 IRON DEFICIENCY ANEMIA SECONDARY TO INADEQUATE DIETARY IRON INTAKE: ICD-10-CM

## 2025-04-03 DIAGNOSIS — D69.6 THROMBOCYTOPENIA: ICD-10-CM

## 2025-04-03 DIAGNOSIS — R35.0 URINARY FREQUENCY: ICD-10-CM

## 2025-04-03 LAB
ALBUMIN UR-MCNC: NEGATIVE MG/DL
APPEARANCE UR: ABNORMAL
BACTERIA #/AREA URNS HPF: ABNORMAL /HPF
BASOPHILS # BLD AUTO: 0 10E3/UL (ref 0–0.2)
BASOPHILS NFR BLD AUTO: 1 %
BILIRUB UR QL STRIP: NEGATIVE
COLOR UR AUTO: YELLOW
EOSINOPHIL # BLD AUTO: 0.2 10E3/UL (ref 0–0.7)
EOSINOPHIL NFR BLD AUTO: 5 %
ERYTHROCYTE [DISTWIDTH] IN BLOOD BY AUTOMATED COUNT: 14.2 % (ref 10–15)
GLUCOSE UR STRIP-MCNC: NEGATIVE MG/DL
HCT VFR BLD AUTO: 35.5 % (ref 35–47)
HGB BLD-MCNC: 12 G/DL (ref 11.7–15.7)
HGB UR QL STRIP: ABNORMAL
IMM GRANULOCYTES # BLD: 0 10E3/UL
IMM GRANULOCYTES NFR BLD: 0 %
KETONES UR STRIP-MCNC: NEGATIVE MG/DL
LEUKOCYTE ESTERASE UR QL STRIP: ABNORMAL
LYMPHOCYTES # BLD AUTO: 1.2 10E3/UL (ref 0.8–5.3)
LYMPHOCYTES NFR BLD AUTO: 29 %
MCH RBC QN AUTO: 31.5 PG (ref 26.5–33)
MCHC RBC AUTO-ENTMCNC: 33.8 G/DL (ref 31.5–36.5)
MCV RBC AUTO: 93 FL (ref 78–100)
MONOCYTES # BLD AUTO: 0.6 10E3/UL (ref 0–1.3)
MONOCYTES NFR BLD AUTO: 14 %
NEUTROPHILS # BLD AUTO: 2.2 10E3/UL (ref 1.6–8.3)
NEUTROPHILS NFR BLD AUTO: 52 %
NITRATE UR QL: POSITIVE
PH UR STRIP: 5.5 [PH] (ref 5–7)
PLATELET # BLD AUTO: 100 10E3/UL (ref 150–450)
RBC # BLD AUTO: 3.81 10E6/UL (ref 3.8–5.2)
RBC #/AREA URNS AUTO: ABNORMAL /HPF
SP GR UR STRIP: 1.01 (ref 1–1.03)
SQUAMOUS #/AREA URNS AUTO: ABNORMAL /LPF
UROBILINOGEN UR STRIP-ACNC: 1 E.U./DL
WBC # BLD AUTO: 4.2 10E3/UL (ref 4–11)
WBC #/AREA URNS AUTO: ABNORMAL /HPF
WBC CLUMPS #/AREA URNS HPF: PRESENT /HPF

## 2025-04-03 RX ORDER — SULFAMETHOXAZOLE AND TRIMETHOPRIM 800; 160 MG/1; MG/1
1 TABLET ORAL 2 TIMES DAILY
Qty: 10 TABLET | Refills: 0 | Status: SHIPPED | OUTPATIENT
Start: 2025-04-03 | End: 2025-04-08

## 2025-04-03 NOTE — TELEPHONE ENCOUNTER
[facility] calls, patient was prescribed Bactrim today. It is listed on her allergies, patient does not want to take Bactrim, is requesting alternative medication. Will forward to ASHLEY Khan and team

## 2025-04-03 NOTE — TELEPHONE ENCOUNTER
See result note. Patient will receive antibiotic prescription from her Infectious Disease provider Dr. Granados at [facility].

## 2025-04-03 NOTE — TELEPHONE ENCOUNTER
Patient calling back.  The on-call ID provider called her but did not prescribe an antibiotic.  Explained to her that he does not have patient's records available.    Patient states she will call Dr. Granados tomorrow morning to have her prescribe an antibiotic.    Patient is asking why Bactrim was prescribed for her when its on her allergy list.    Please advise, thanks.

## 2025-04-04 ENCOUNTER — HOSPITAL ENCOUNTER (EMERGENCY)
Facility: CLINIC | Age: 85
Discharge: HOME | End: 2025-04-04
Attending: EMERGENCY MEDICINE | Admitting: EMERGENCY MEDICINE
Payer: COMMERCIAL

## 2025-04-04 VITALS
TEMPERATURE: 98.4 F | HEART RATE: 70 BPM | DIASTOLIC BLOOD PRESSURE: 72 MMHG | BODY MASS INDEX: 36.82 KG/M2 | OXYGEN SATURATION: 95 % | RESPIRATION RATE: 18 BRPM | WEIGHT: 242.95 LBS | HEIGHT: 68 IN | SYSTOLIC BLOOD PRESSURE: 164 MMHG

## 2025-04-04 DIAGNOSIS — N30.01 ACUTE CYSTITIS WITH HEMATURIA: ICD-10-CM

## 2025-04-04 PROCEDURE — 99283 EMERGENCY DEPT VISIT LOW MDM: CPT

## 2025-04-04 RX ORDER — CEPHALEXIN 500 MG/1
500 CAPSULE ORAL 2 TIMES DAILY
Qty: 10 CAPSULE | Refills: 0 | Status: SHIPPED | OUTPATIENT
Start: 2025-04-04 | End: 2025-04-09

## 2025-04-04 NOTE — ED PROVIDER NOTES
Emergency Department Note      History of Present Illness     Chief Complaint   Abnormal Labs      HPI   Savanna Rehman is a 84 year old female with a history of atrial fibrillation on Eliquis,  who presents to the ED today for evaluation of abnormal labs. The patient reports she was told she has E. coli in her urine, but she was prescribed an antibiotic that she's allergic to. She attempted to contact another doctor who she's been prescribed antibiotics by in the past, but she was unable to, so she came to the ED to be prescribed an antibiotic. The patient endorses difficulty emptying her bladder. She also endorses lower abdominal pain. She states she has been eating and drinking fine, though she thinks she eats too many carbs. The patient denies any pain or burning with urination.     Independent Historian   None    Review of External Notes   I reviewed the patient's clinic note from 4/2 where the patient had a urinalysis. Urine was growing E. Coli.     Past Medical History     Medical History and Problem List   Past Medical History:   Diagnosis Date    Acute encephalopathy 09/21/2023    Anemia     Atrial fibrillation (H)     CAD (coronary artery disease)     Chronic pain     Congestive heart failure (H)     Degenerative disk disease     Diabetes (H)     Fibromyalgia     Gastro-oesophageal reflux disease     Gout     Hiatal hernia     Mumps     Neuropathy     CRISTIANA (obstructive sleep apnea) - CPAP     Palpitations     Pernicious anemia     Sleep apnea     Urinary incontinence     Vitamin D deficiency        Medications   apixaban ANTICOAGULANT (ELIQUIS) 2.5 MG tablet  blood glucose (NO BRAND SPECIFIED) test strip  blood glucose monitoring (NO BRAND SPECIFIED) meter device kit  butalbital-acetaminophen-caffeine (ESGIC) -40 MG tablet  butalbital-aspirin-caffeine (FIORINAL) -40 MG capsule  cephALEXin (KEFLEX) 500 MG capsule  cholecalciferol (VITAMIN D3) 10 mcg (400 units) TABS tablet  EPINEPHrine (ANY  "BX GENERIC EQUIV) 0.3 MG/0.3ML injection 2-pack  furosemide (LASIX) 40 MG tablet  glipiZIDE (GLUCOTROL XL) 2.5 MG 24 hr tablet  hydrOXYzine HCl (ATARAX) 25 MG tablet  loperamide (IMODIUM) 2 MG capsule  metoclopramide (REGLAN) 5 MG tablet  metoprolol succinate ER (TOPROL XL) 25 MG 24 hr tablet  thin (NO BRAND SPECIFIED) lancets  tiZANidine (ZANAFLEX) 2 MG tablet        Surgical History   Past Surgical History:   Procedure Laterality Date    APPENDECTOMY      CHOLECYSTECTOMY      COLONOSCOPY  3/15/2011    COLONOSCOPY N/A 3/15/2023    Procedure: COLONOSCOPY, WITH POLYPECTOMY AND BIOPSY;  Surgeon: Maci Coronel MD;  Location:  GI    COLONOSCOPY N/A 3/15/2023    Procedure: COLONOSCOPY, FLEXIBLE, WITH LESION REMOVAL USING SNARE;  Surgeon: Maci Coronel MD;  Location:  GI    CORONARY ANGIOGRAPHY ADULT ORDER      CYSTOSCOPY, BIOPSY BLADDER, COMBINED N/A 7/13/2020    Procedure: CYSTOSCOPY, WITH BLADDER BIOPSY;  Surgeon: Deisy Wilson MD;  Location: [facility] OR    EP PACEMAKER DEVICE & LEAD IMPLANT- RIGHT ATRIAL & RIGHT VENTRICULAR Left 4/5/2024    Procedure: Pacemaker Device & Lead Implant - Right Atrial & Right Ventricular;  Surgeon: Raul Plunkett MD;  Location:  HEART CARDIAC CATH LAB    ESOPHAGOSCOPY, GASTROSCOPY, DUODENOSCOPY (EGD), COMBINED N/A 8/12/2024    Procedure: Esophagogastroduodenoscopy;  Surgeon: Mohan Mcguire MD;  Location: [facility] OR    HEART CATH LEFT HEART CATH  12/30/16    medication management    HYSTERECTOMY TOTAL ABDOMINAL      Knee replacement NOS Left     LAPAROSCOPIC NISSEN FUNDOPLICATION N/A 2/4/2015    Procedure: LAPAROSCOPIC NISSEN FUNDOPLICATION;  Surgeon: Armando Ansari MD;  Location:  OR    TONSILLECTOMY      TRANSPOSITION ULNAR NERVE (ELBOW)         Physical Exam     Patient Vitals for the past 24 hrs:   BP Temp Temp src Pulse Resp SpO2 Height Weight   04/04/25 1028 (!) 164/72 98.4  F (36.9  C) Oral 70 18 95 % 1.715 m (5' 7.5\") 110.2 kg (242 lb 15.2 oz)     Physical " Exam  GENERAL: Awake, alert  CARDIOVASCULAR: Regular rate and rhythm  LUNGS: Clear bilaterally, no wheezes rales or rhonchi  ABDOMEN: Soft, no rebound or guarding. Minimal suprapubic tenderness. No CVA tenderness.   EXTREMITIES: No peripheral edema  NEURO: Awake and alert, moves all extremities.     Diagnostics     Lab Results   Labs Ordered and Resulted from Time of ED Arrival to Time of ED Departure - No data to display    Imaging   No orders to display     ED Course      Medications Administered   Medications - No data to display    Procedures   Procedures       ED Course   ED Course as of 04/04/25 1055   Fri Apr 04, 2025   1037 I obtained history and examined the patient as noted above.          Medical Decision Making / Diagnosis     MDM   Savanna Rehman is a 84 year old female who presents for evaluation of positive urine culture and request for antibiotics.  I reviewed her prior urine cultures that were growing Klebsiella last year, but this urine culture is growing E. coli.  She has multiple allergies to antibiotics but no allergies to cephalosporins and has tolerated Keflex in the past.  I believe it is reasonable to start Keflex for cystitis, her abdomen was benign with minimal suprapubic tenderness, no CVA tenderness, no fever or vomiting and do not suspect pyelonephritis at this time.  She had no leukocytosis yesterday, appears otherwise well, do not believe that labs need to be repeated today but she will return if worsening and we will await final sensitivities    Disposition   The patient was discharged.     Diagnosis     ICD-10-CM    1. Acute cystitis with hematuria  N30.01            Discharge Medications   New Prescriptions    CEPHALEXIN (KEFLEX) 500 MG CAPSULE    Take 1 capsule (500 mg) by mouth 2 times daily for 5 days.         Scribe Disclosure:  Katt GOODMAN, guicho serving as a scribe at 10:54 AM on 4/4/2025 to document services personally performed by Shannon Dia MD based on my  observations and the provider's statements to me.        Shannon Dia MD  04/04/25 [id number]

## 2025-04-04 NOTE — ED TRIAGE NOTES
Pt arrives needing an Rx for abx for a UTI. Reports only ID can prescribe antibiotics and ID was unwilling to prescribe because they have not seen the patient in three years. Endorses abdominal pain, constipation, and chronic UTIs.

## 2025-04-10 ENCOUNTER — MEDICAL CORRESPONDENCE (OUTPATIENT)
Dept: HEALTH INFORMATION MANAGEMENT | Facility: CLINIC | Age: 85
End: 2025-04-10

## 2025-04-10 ENCOUNTER — APPOINTMENT (OUTPATIENT)
Dept: CT IMAGING | Facility: CLINIC | Age: 85
End: 2025-04-10
Attending: STUDENT IN AN ORGANIZED HEALTH CARE EDUCATION/TRAINING PROGRAM
Payer: COMMERCIAL

## 2025-04-10 ENCOUNTER — HOSPITAL ENCOUNTER (EMERGENCY)
Facility: CLINIC | Age: 85
Discharge: HOME | End: 2025-04-10
Attending: STUDENT IN AN ORGANIZED HEALTH CARE EDUCATION/TRAINING PROGRAM
Payer: COMMERCIAL

## 2025-04-10 VITALS
RESPIRATION RATE: 11 BRPM | TEMPERATURE: 97.7 F | OXYGEN SATURATION: 94 % | BODY MASS INDEX: 33.58 KG/M2 | HEIGHT: 71 IN | HEART RATE: 62 BPM | DIASTOLIC BLOOD PRESSURE: 94 MMHG | SYSTOLIC BLOOD PRESSURE: 134 MMHG | WEIGHT: 239.86 LBS

## 2025-04-10 DIAGNOSIS — H47.011 ISCHEMIC OPTIC NEUROPATHY OF RIGHT EYE: ICD-10-CM

## 2025-04-10 LAB
ANION GAP SERPL CALCULATED.3IONS-SCNC: 10 MMOL/L (ref 7–15)
APTT PPP: 32 SECONDS (ref 22–38)
ATRIAL RATE - MUSE: 75 BPM
BASOPHILS # BLD AUTO: 0.1 10E3/UL (ref 0–0.2)
BASOPHILS NFR BLD AUTO: 1 %
BUN SERPL-MCNC: 14.5 MG/DL (ref 8–23)
CALCIUM SERPL-MCNC: 9 MG/DL (ref 8.8–10.4)
CHLORIDE SERPL-SCNC: 101 MMOL/L (ref 98–107)
CREAT BLD-MCNC: 1 MG/DL (ref 0.5–1)
CREAT SERPL-MCNC: 0.96 MG/DL (ref 0.51–0.95)
CRP SERPL-MCNC: 4.5 MG/L
DIASTOLIC BLOOD PRESSURE - MUSE: NORMAL MMHG
EGFRCR SERPLBLD CKD-EPI 2021: 56 ML/MIN/1.73M2
EGFRCR SERPLBLD CKD-EPI 2021: 59 ML/MIN/1.73M2
EOSINOPHIL # BLD AUTO: 0.2 10E3/UL (ref 0–0.7)
EOSINOPHIL NFR BLD AUTO: 4 %
ERYTHROCYTE [DISTWIDTH] IN BLOOD BY AUTOMATED COUNT: 14.1 % (ref 10–15)
ERYTHROCYTE [SEDIMENTATION RATE] IN BLOOD BY WESTERGREN METHOD: 20 MM/HR (ref 0–30)
GLUCOSE SERPL-MCNC: 133 MG/DL (ref 70–99)
HCO3 SERPL-SCNC: 27 MMOL/L (ref 22–29)
HCT VFR BLD AUTO: 35 % (ref 35–47)
HGB BLD-MCNC: 12 G/DL (ref 11.7–15.7)
HOLD SPECIMEN: NORMAL
IMM GRANULOCYTES # BLD: 0 10E3/UL
IMM GRANULOCYTES NFR BLD: 0 %
INR PPP: 1.25 (ref 0.85–1.15)
INTERPRETATION ECG - MUSE: NORMAL
LYMPHOCYTES # BLD AUTO: 1.5 10E3/UL (ref 0.8–5.3)
LYMPHOCYTES NFR BLD AUTO: 31 %
MCH RBC QN AUTO: 31.4 PG (ref 26.5–33)
MCHC RBC AUTO-ENTMCNC: 34.3 G/DL (ref 31.5–36.5)
MCV RBC AUTO: 92 FL (ref 78–100)
MONOCYTES # BLD AUTO: 0.7 10E3/UL (ref 0–1.3)
MONOCYTES NFR BLD AUTO: 13 %
NEUTROPHILS # BLD AUTO: 2.5 10E3/UL (ref 1.6–8.3)
NEUTROPHILS NFR BLD AUTO: 51 %
NRBC # BLD AUTO: 0 10E3/UL
NRBC BLD AUTO-RTO: 0 /100
P AXIS - MUSE: NORMAL DEGREES
PLATELET # BLD AUTO: 127 10E3/UL (ref 150–450)
POTASSIUM SERPL-SCNC: 4.3 MMOL/L (ref 3.4–5.3)
PR INTERVAL - MUSE: NORMAL MS
QRS DURATION - MUSE: 102 MS
QT - MUSE: 430 MS
QTC - MUSE: 454 MS
R AXIS - MUSE: -32 DEGREES
RBC # BLD AUTO: 3.82 10E6/UL (ref 3.8–5.2)
SODIUM SERPL-SCNC: 138 MMOL/L (ref 135–145)
SYSTOLIC BLOOD PRESSURE - MUSE: NORMAL MMHG
T AXIS - MUSE: 114 DEGREES
TROPONIN T SERPL HS-MCNC: 18 NG/L
VENTRICULAR RATE- MUSE: 67 BPM
WBC # BLD AUTO: 4.9 10E3/UL (ref 4–11)

## 2025-04-10 PROCEDURE — 250N000009 HC RX 250: Performed by: STUDENT IN AN ORGANIZED HEALTH CARE EDUCATION/TRAINING PROGRAM

## 2025-04-10 PROCEDURE — 85004 AUTOMATED DIFF WBC COUNT: CPT | Performed by: STUDENT IN AN ORGANIZED HEALTH CARE EDUCATION/TRAINING PROGRAM

## 2025-04-10 PROCEDURE — 85652 RBC SED RATE AUTOMATED: CPT | Performed by: STUDENT IN AN ORGANIZED HEALTH CARE EDUCATION/TRAINING PROGRAM

## 2025-04-10 PROCEDURE — 86140 C-REACTIVE PROTEIN: CPT | Performed by: STUDENT IN AN ORGANIZED HEALTH CARE EDUCATION/TRAINING PROGRAM

## 2025-04-10 PROCEDURE — 80048 BASIC METABOLIC PNL TOTAL CA: CPT | Performed by: STUDENT IN AN ORGANIZED HEALTH CARE EDUCATION/TRAINING PROGRAM

## 2025-04-10 PROCEDURE — 85610 PROTHROMBIN TIME: CPT | Performed by: STUDENT IN AN ORGANIZED HEALTH CARE EDUCATION/TRAINING PROGRAM

## 2025-04-10 PROCEDURE — 99207 PR NO BILLABLE SERVICE THIS VISIT: CPT | Performed by: NURSE PRACTITIONER

## 2025-04-10 PROCEDURE — 70450 CT HEAD/BRAIN W/O DYE: CPT

## 2025-04-10 PROCEDURE — 70496 CT ANGIOGRAPHY HEAD: CPT

## 2025-04-10 PROCEDURE — 250N000011 HC RX IP 250 OP 636: Performed by: STUDENT IN AN ORGANIZED HEALTH CARE EDUCATION/TRAINING PROGRAM

## 2025-04-10 PROCEDURE — 93005 ELECTROCARDIOGRAM TRACING: CPT

## 2025-04-10 PROCEDURE — 99291 CRITICAL CARE FIRST HOUR: CPT | Mod: 25

## 2025-04-10 PROCEDURE — 36415 COLL VENOUS BLD VENIPUNCTURE: CPT | Performed by: STUDENT IN AN ORGANIZED HEALTH CARE EDUCATION/TRAINING PROGRAM

## 2025-04-10 PROCEDURE — 84484 ASSAY OF TROPONIN QUANT: CPT | Performed by: STUDENT IN AN ORGANIZED HEALTH CARE EDUCATION/TRAINING PROGRAM

## 2025-04-10 PROCEDURE — 85730 THROMBOPLASTIN TIME PARTIAL: CPT | Performed by: STUDENT IN AN ORGANIZED HEALTH CARE EDUCATION/TRAINING PROGRAM

## 2025-04-10 PROCEDURE — 82565 ASSAY OF CREATININE: CPT

## 2025-04-10 RX ORDER — IOPAMIDOL 755 MG/ML
500 INJECTION, SOLUTION INTRAVASCULAR ONCE
Status: COMPLETED | OUTPATIENT
Start: 2025-04-10 | End: 2025-04-10

## 2025-04-10 RX ADMIN — SODIUM CHLORIDE 80 ML: 9 INJECTION, SOLUTION INTRAVENOUS at 12:16

## 2025-04-10 RX ADMIN — IOPAMIDOL 67 ML: 755 INJECTION, SOLUTION INTRAVENOUS at 12:16

## 2025-04-10 ASSESSMENT — ACTIVITIES OF DAILY LIVING (ADL)
ADLS_ACUITY_SCORE: 57

## 2025-04-10 NOTE — CONSULTS
"  [facility]    Stroke Telephone Note    I was called by Ino Nuñez on 04/10/25 regarding patient Savanna Rehman. The patient is a 84 year old female with past medical history significant for atrial fibrillation (on apixaban), CAD, heart failure, CRISTIANA.  She was into the ED from ophthalmology clinic for further evaluation of right eye visual changes.  Per ED provider, she has had severe headache and right eye abnormal vision for approximately 6 weeks.  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  Rule out embolic stroke and arteritis\". She is unable to undergo MRI brain due to pacemaker. No additional eye clinic records available for review.     Vitals  BP: (!) 141/74   Pulse: 60   Resp: 11   Temp: 97.7  F (36.5  C)   Weight: 108.8 kg (239 lb 13.8 oz)    Imaging Findings  CT head: negative for acute pathology  CTA head/neck: moderate focal stenosis of the L P2 segment    Impression  Aubacute headache and abnormal R eye vision. No evidence of hemorrhage on CT head.     Recommendations  - Given unclear history and ophthalmologic exam findings (no records available per ED provider), recommend transfer to [facility] for further ophthalmologic evaluation.   - ESR/CRP pending   - May need MRI brain, which cannot be completed at [facility]     Case discussed with vascular neurology attending Dr. Padilla.    My recommendations are based on the information provided over the phone by Savanna Rehman's in-person providers. They are not intended to replace the clinical judgment of her in-person providers. I was not requested to personally see or examine the patient at this time.     Rosina Manrique, CNP  Vascular Neurology    To page me or covering stroke neurology team member, click here: AMCOM  Choose \"On Call\" tab at top, then select \"NEUROLOGY/ALL SITES\" from " "middle drop-down box, press Enter, then look for \"stroke\" or \"telestroke\" for your site.    "

## 2025-04-10 NOTE — ED NOTES
RN spoke with MD regarding the patient wanting an update. MD states he does not have one. RN explained that there is not an update currently.

## 2025-04-10 NOTE — DISCHARGE INSTRUCTIONS
Return to the emergency department if symptoms are worsening, become concerning, or for any other concerns.     Talk to your hematologist regarding your anticoagulation dosing regarding potentially going up to the 5 mg twice per day.    Stroke neurology should contact you regarding an outpatient MRI and outpatient follow-up.

## 2025-04-10 NOTE — ED PROVIDER NOTES
Emergency Department Note      History of Present Illness     Chief Complaint   Stroke Symptoms      HPI   Savanna Rehman is a 84 year old female with history of A-fib on apixaban, coronary disease, CHF, diabetes, among many others, presenting from ophthalmology clinic after the ophthalmologist noted that there is evidence of new right ischemic optic neuropathy with right sided visual field deficit and corresponding nerve loss.  They wanted her assessed for CVA.  Nursing note also comments on right hand numbness.  It is actually just the fingertips of the right 1st, 2nd and 3rd digits.  She reports she has peripheral neuropathy.  She has had this for 3 weeks.  She also has peripheral neuropathy in her feet.  She has also had a gradual onset migraine for 6 weeks.  Denies trauma to the head.  No slurred speech.  No confusion.  7 to 10 days ago she had abrupt onset scotoma in the right visual field.    Independent Historian   None    Review of External Notes   Discharge summary April 8, 2024-A-fib with slow ventricular response symptomatic    Past Medical History     Medical History and Problem List   Past Medical History:   Diagnosis Date    Acute encephalopathy 09/21/2023    Anemia     Atrial fibrillation (H)     CAD (coronary artery disease)     Chronic pain     Congestive heart failure (H)     Degenerative disk disease     Diabetes (H)     Fibromyalgia     Gastro-oesophageal reflux disease     Gout     Hiatal hernia     Mumps     Neuropathy     CRISTIANA (obstructive sleep apnea) - CPAP     Palpitations     Pernicious anemia     Sleep apnea     Urinary incontinence     Vitamin D deficiency        Medications   apixaban ANTICOAGULANT (ELIQUIS) 2.5 MG tablet  blood glucose (NO BRAND SPECIFIED) test strip  blood glucose monitoring (NO BRAND SPECIFIED) meter device kit  butalbital-acetaminophen-caffeine (ESGIC) -40 MG tablet  butalbital-aspirin-caffeine (FIORINAL) -40 MG capsule  cholecalciferol (VITAMIN D3)  10 mcg (400 units) TABS tablet  EPINEPHrine (ANY BX GENERIC EQUIV) 0.3 MG/0.3ML injection 2-pack  furosemide (LASIX) 40 MG tablet  glipiZIDE (GLUCOTROL XL) 2.5 MG 24 hr tablet  hydrOXYzine HCl (ATARAX) 25 MG tablet  loperamide (IMODIUM) 2 MG capsule  metoclopramide (REGLAN) 5 MG tablet  metoprolol succinate ER (TOPROL XL) 25 MG 24 hr tablet  thin (NO BRAND SPECIFIED) lancets  tiZANidine (ZANAFLEX) 2 MG tablet        Surgical History   Past Surgical History:   Procedure Laterality Date    APPENDECTOMY      CHOLECYSTECTOMY      COLONOSCOPY  3/15/2011    COLONOSCOPY N/A 3/15/2023    Procedure: COLONOSCOPY, WITH POLYPECTOMY AND BIOPSY;  Surgeon: Maci Coronel MD;  Location:  GI    COLONOSCOPY N/A 3/15/2023    Procedure: COLONOSCOPY, FLEXIBLE, WITH LESION REMOVAL USING SNARE;  Surgeon: Maci Coronel MD;  Location:  GI    CORONARY ANGIOGRAPHY ADULT ORDER      CYSTOSCOPY, BIOPSY BLADDER, COMBINED N/A 7/13/2020    Procedure: CYSTOSCOPY, WITH BLADDER BIOPSY;  Surgeon: Deisy Wilson MD;  Location: [facility] OR    EP PACEMAKER DEVICE & LEAD IMPLANT- RIGHT ATRIAL & RIGHT VENTRICULAR Left 4/5/2024    Procedure: Pacemaker Device & Lead Implant - Right Atrial & Right Ventricular;  Surgeon: Raul Plunkett MD;  Location:  HEART CARDIAC CATH LAB    ESOPHAGOSCOPY, GASTROSCOPY, DUODENOSCOPY (EGD), COMBINED N/A 8/12/2024    Procedure: Esophagogastroduodenoscopy;  Surgeon: Mohan Mcguire MD;  Location: [facility] OR    HEART CATH LEFT HEART CATH  12/30/16    medication management    HYSTERECTOMY TOTAL ABDOMINAL      Knee replacement NOS Left     LAPAROSCOPIC NISSEN FUNDOPLICATION N/A 2/4/2015    Procedure: LAPAROSCOPIC NISSEN FUNDOPLICATION;  Surgeon: Armando Ansari MD;  Location:  OR    TONSILLECTOMY      TRANSPOSITION ULNAR NERVE (ELBOW)         Physical Exam     Patient Vitals for the past 24 hrs:   BP Temp Temp src Pulse Resp SpO2 Height Weight   04/10/25 1703 (!) 134/94 -- -- 62 -- 94 % -- --   04/10/25 1630 (!)  "144/73 -- -- 61 -- 95 % -- --   04/10/25 1345 -- -- -- 60 11 98 % -- --   04/10/25 1344 (!) 141/74 -- -- 73 -- -- -- --   04/10/25 1246 135/63 -- -- 65 18 -- -- --   04/10/25 1131 (!) 145/84 -- -- 87 18 97 % -- --   04/10/25 1103 (!) 181/79 97.7  F (36.5  C) Oral 85 20 96 % 1.803 m (5' 11\") 108.8 kg (239 lb 13.8 oz)     Physical Exam  GENERAL: Patient well-appearing  HEAD: Atraumatic.  EYES: Anicteric. PERRL  NOSE: No active bleeding  MOUTH: Moist mucosa  THROAT: Patent airway.   NECK: No rigidity  CV: RRR   PULM: CTAB with good aeration; no retractions, rales, rhonchi, or wheezing  ABD: Soft, nontender, nondistended, no guarding, no peritoneal signs   DERM: No rash. Skin warm and dry  EXTREMITY: Moving all extremities without difficulty.   NEURO: Alert, answering questions appropriately. Speaks clearly. CN 2-12 fully intact. Strength 5/5 bilateral LE/UE. Sensation fully intact to light touch symmetrically bilateral LE/UE. Normal finger-to-nose and heel to shin. No nystagmus. No visual field cut. Normal gait assessment with walker without ataxia.      Diagnostics     Lab Results   Labs Ordered and Resulted from Time of ED Arrival to Time of ED Departure   BASIC METABOLIC PANEL - Abnormal       Result Value    Sodium 138      Potassium 4.3      Chloride 101      Carbon Dioxide (CO2) 27      Anion Gap 10      Urea Nitrogen 14.5      Creatinine 0.96 (*)     GFR Estimate 59 (*)     Calcium 9.0      Glucose 133 (*)    INR - Abnormal    INR 1.25 (*)    TROPONIN T, HIGH SENSITIVITY - Abnormal    Troponin T, High Sensitivity 18 (*)    CBC WITH PLATELETS AND DIFFERENTIAL - Abnormal    WBC Count 4.9      RBC Count 3.82      Hemoglobin 12.0      Hematocrit 35.0      MCV 92      MCH 31.4      MCHC 34.3      RDW 14.1      Platelet Count 127 (*)     % Neutrophils 51      % Lymphocytes 31      % Monocytes 13      % Eosinophils 4      % Basophils 1      % Immature Granulocytes 0      NRBCs per 100 WBC 0      Absolute Neutrophils " 2.5      Absolute Lymphocytes 1.5      Absolute Monocytes 0.7      Absolute Eosinophils 0.2      Absolute Basophils 0.1      Absolute Immature Granulocytes 0.0      Absolute NRBCs 0.0     ISTAT CREATININE POCT - Abnormal    Creatinine POCT 1.0      GFR, ESTIMATED POCT 56 (*)    PARTIAL THROMBOPLASTIN TIME - Normal    aPTT 32     CRP INFLAMMATION - Normal    CRP Inflammation 4.50     ERYTHROCYTE SEDIMENTATION RATE AUTO - Normal    Erythrocyte Sedimentation Rate 20     GLUCOSE MONITOR NURSING POCT   ISTAT CREATININE POCT       Imaging   CTA Head Neck w Contrast   Final Result   IMPRESSION:    HEAD CT:   1.  No CT evidence for acute intracranial process.   2.  Brain atrophy and presumed chronic microvascular ischemic changes as above.      HEAD CTA:    1.  No vascular cutoff of the proximal major intracranial arteries.   2.  Moderate focal stenosis of the mid left P2 segment.      NECK CTA:   1.  Patent arteries in the neck without evidence of dissection.   2.  Atherosclerotic disease along the carotid arteries without significant stenosis by NASCET criteria.            CT Head w/o Contrast   Final Result   IMPRESSION:    HEAD CT:   1.  No CT evidence for acute intracranial process.   2.  Brain atrophy and presumed chronic microvascular ischemic changes as above.      HEAD CTA:    1.  No vascular cutoff of the proximal major intracranial arteries.   2.  Moderate focal stenosis of the mid left P2 segment.      NECK CTA:   1.  Patent arteries in the neck without evidence of dissection.   2.  Atherosclerotic disease along the carotid arteries without significant stenosis by NASCET criteria.            MR Brain w/o & w Contrast    (Results Pending)       EKG   ECG interpreted by me.  Time 1236  Ventricular paced rhythm rate 67.  Negative Sgarbossa criteria.  .  QTc 444.      Independent Interpretation   CT Head: No intracranial hemorrhage.    ED Course      Medications Administered   Medications   iopamidol  (ISOVUE-370) solution 500 mL (67 mLs Intravenous $Given 4/10/25 1216)   sodium chloride 0.9 % bag for CT scan flush (80 mLs Intravenous $Given 4/10/25 1216)       Procedures   Procedures     Discussion of Management   Discussed with ophthalmologist Dr. Aguilar.  Discussed with stroke neurology Dr. Padilla.    ED Course        Additional Documentation  None    Medical Decision Making / Diagnosis     CMS Diagnoses: None    MIPS       None    MDM   Savanna Rehman is a 84 year old female sent in from neurology clinic for evaluation of CVA.  No focal neurologic deficit on my exam.  Obtain labs and CT scans for assessment initially.  CT head negative for acute process.  CTA head and neck negative for acute process.  CTA head showed moderate focal stenosis P2.  Inflammatory markers negative.  Do not think this is temporal arteritis-that was one of the reasons why she was sent here per my discussion with the ophthalmologist.  Her troponin was 18, which is chronically where it sat and she is not complaining of chest pain.  Do not think this needs repeating.  Due to her having a pacemaker, she is not a candidate for an MRI here.  I discussed with stroke neurology.  They state due to the fact that her vision changes occurred 7 to 10 days ago, they do not think she requires emergent admission, nor emergent MRI.  Initial plan was to send to the [facility] but they are closed for transfers.  Ultimately I think it is very reasonable for patient to get an outpatient MRI which stroke neurology has placed.  I placed a referral for stroke neurology.  We recommend she talk to her doctor regarding her anticoagulation and she could potentially increase to 5 mg p.o. twice daily of the Eliquis.  Patient was reassessed multiple times and feels well to go home.  All questions answered.  Discharged in stable condition with strict return precautions.    Disposition   The patient was discharged.     Diagnosis     ICD-10-CM    1.  Ischemic optic neuropathy of right eye  H47.011 Adult Neurology  Referral     MR Brain w/o & w Contrast           Discharge Medications   New Prescriptions    No medications on file         MD Joaquin Bernard Kevin, MD  04/10/25 1801

## 2025-04-10 NOTE — ED TRIAGE NOTES
"Referred from eye clinic d/t concerns for stroke. Per ophthalmologist hand written note (brought by pt to triage),  \"new right ischemic optic neuropathy w/right VF defect & corresponding nerve loss. Also R hand numbness & severe headache.\" Pt reports 3 weeks of \"foggy\" vision, numbness in fingertips, and migraine for 6 weeks. HTN in triage. A&Ox4. ABCs intact.       "

## 2025-04-11 ENCOUNTER — TRANSFERRED RECORDS (OUTPATIENT)
Dept: MULTI SPECIALTY CLINIC | Facility: CLINIC | Age: 85
End: 2025-04-11

## 2025-04-11 ENCOUNTER — TELEPHONE (OUTPATIENT)
Dept: NEUROLOGY | Facility: CLINIC | Age: 85
End: 2025-04-11

## 2025-04-11 LAB — RETINOPATHY: NORMAL

## 2025-04-11 NOTE — TELEPHONE ENCOUNTER
Reason for call: MR Device Safety Clearance    Please create a MR Device Safety order  Patient is reporting patient has Pacemaker  Type of MR exam: MR Brain w/o & w Contrast     Do you get your Device checked at [facility]?: Yes - [facility]

## 2025-04-11 NOTE — TELEPHONE ENCOUNTER
This Device meets the FDA criteria for MRI approval.    [employer] MRI Access quang can be used to program device to MRI mode. Telephone encounter routed to Central Imaging Scheduling department.

## 2025-04-11 NOTE — TELEPHONE ENCOUNTER
Is the implanted device safe for MRI Exam? Yes  Is this device 3T compatible? Yes  Device Type: Pacemaker      Device Information: Medtronic, PPM [place] (S)      Cardiology Orders for Device Programming     -- Yes -- The patient has a MRI conditional pulse generator and leads from the same     -- Yes -- The pulse generator and leads have been implanted for at least 6 weeks. (Does not apply to [employer] devices)    -- Yes-- The device is implanted in the right or left pectoral region    -- Yes -- There are not any additional active cardiac devices, abandoned leads, lead extenders or adapters    -- Yes -- Lead impedances are within the normal range per  guidelines.    -- Yes -- If the patient is pacemaker dependent the thresholds are less than or equal to 4.0 V @ 1.0 ms    -- Yes -- RV lead is programmed to bipolar pacing operation or pacing off. If no, MRI TO CONTACT THE DEVICE REP FOR PROGRAMMING. (Sensing can be bipolar or unipolar).     -- N/A -- LV lead programmed to bipolar pacing operation or pacing off. If no, CONTACT THE DEVICE REP TO DETERMINE IF REPROGRAMMING WILL NEED TO BE COMPLETED DAY OF PROCEDURE.       Date of last Remote Device check: 1/30/2025  Results of last Device check:  1.   Right atrium impedance: NA  2.   Right ventricle impedance: 570 ohms  3.   Left ventricle impedance: NA     4.   Right atrium threshold: NA  5.   Right ventricle threshold: 0.875V @ 0.4ms  6.   Left ventricle threshold: NA     Device programming during the scan guidelines   Pacing Mode (check one): Use the recommended pacing mode per Medtronic MRI Access Application    If remote reprogramming is applicable, please contact the Rep to assist

## 2025-04-14 ENCOUNTER — TELEPHONE (OUTPATIENT)
Dept: ONCOLOGY | Facility: CLINIC | Age: 85
End: 2025-04-14
Payer: COMMERCIAL

## 2025-04-14 ENCOUNTER — PATIENT OUTREACH (OUTPATIENT)
Dept: CARE COORDINATION | Facility: CLINIC | Age: 85
End: 2025-04-14
Payer: COMMERCIAL

## 2025-04-14 NOTE — CONFIDENTIAL NOTE
Pt wanted to update provider and RN to let them know that she has an MRI scheduled for tomorrow at 8 am. Will forward to care team to update.

## 2025-04-14 NOTE — PROGRESS NOTES
Connected Care Resource Center  Community Health Worker Initial Outreach    CHW Initial Information Gathering:  Referral Source: ED Follow-Up  Preferred Hospital: [facility], [place]  [phone]  Current living arrangement:: I live alone  Type of residence:: Independent Senior Living  Community Resources: County Programs, , Transportation Services, Home Care  Supplies Currently Used at Home: Incontinence Supplies  Equipment Currently Used at Home: walker, standard, other (see comments) (motorized scooter)  Informal Support system:: Spouse  No PCP office visit in Past Year: No  Transportation means:: [employer], Family  CHW Additional Questions  If ED/Hospital discharge, follow-up appointment scheduled as recommended?: Yes  Medication changes made following ED/Hospital discharge?: Yes, patient to be scheduled with CC RN/SW within approx 1 business day  [facility]  Patient sent Social Drivers of Health questionnaire?: Yes    Patient accepts CC: Yes. Patient scheduled for assessment with CC RN on 4/30/2025 at 2:00 PM. Patient noted desire to discuss general care coordination, finding a new primary doctor within [facility], and possibly regarding transportation services.       Silvia Alva  Community Health Worker  Connected Care Resource Center, [facility]    *Connected Care Resource Team does NOT follow patient ongoing. Referrals are identified based on internal discharge reports and the outreach is to ensure patient has an understanding of their discharge instructions.

## 2025-04-14 NOTE — CONFIDENTIAL NOTE
Called pt with recommendations from provider.    Patsy Pompa MD Barta, Cody RN  Cc: Sheree Herrera RN  Caller: Unspecified (Today,  9:44 AM)  Ok, we will touch base with her after the results of the MRI adjust the dose of eliquis accordingly    Pt verbalized understanding.

## 2025-04-15 ENCOUNTER — HOSPITAL ENCOUNTER (OUTPATIENT)
Facility: CLINIC | Age: 85
Discharge: HOME | End: 2025-04-15
Admitting: RADIOLOGY
Payer: COMMERCIAL

## 2025-04-15 ENCOUNTER — PATIENT OUTREACH (OUTPATIENT)
Dept: ONCOLOGY | Facility: CLINIC | Age: 85
End: 2025-04-15
Payer: COMMERCIAL

## 2025-04-15 ENCOUNTER — HOSPITAL ENCOUNTER (OUTPATIENT)
Dept: MRI IMAGING | Facility: CLINIC | Age: 85
Discharge: HOME | End: 2025-04-15
Attending: STUDENT IN AN ORGANIZED HEALTH CARE EDUCATION/TRAINING PROGRAM
Payer: COMMERCIAL

## 2025-04-15 ENCOUNTER — HOSPITAL ENCOUNTER (OUTPATIENT)
Dept: GENERAL RADIOLOGY | Facility: CLINIC | Age: 85
Discharge: HOME | End: 2025-04-15
Attending: STUDENT IN AN ORGANIZED HEALTH CARE EDUCATION/TRAINING PROGRAM
Payer: COMMERCIAL

## 2025-04-15 VITALS — DIASTOLIC BLOOD PRESSURE: 67 MMHG | OXYGEN SATURATION: 97 % | SYSTOLIC BLOOD PRESSURE: 140 MMHG

## 2025-04-15 DIAGNOSIS — H47.011 ISCHEMIC OPTIC NEUROPATHY OF RIGHT EYE: ICD-10-CM

## 2025-04-15 DIAGNOSIS — Z95.0 PACEMAKER: ICD-10-CM

## 2025-04-15 PROCEDURE — A9585 GADOBUTROL INJECTION: HCPCS | Performed by: STUDENT IN AN ORGANIZED HEALTH CARE EDUCATION/TRAINING PROGRAM

## 2025-04-15 PROCEDURE — 999N000154 HC STATISTIC RADIOLOGY XRAY, US, CT, MAR, NM

## 2025-04-15 PROCEDURE — 255N000002 HC RX 255 OP 636: Performed by: STUDENT IN AN ORGANIZED HEALTH CARE EDUCATION/TRAINING PROGRAM

## 2025-04-15 PROCEDURE — 71046 X-RAY EXAM CHEST 2 VIEWS: CPT

## 2025-04-15 PROCEDURE — 70553 MRI BRAIN STEM W/O & W/DYE: CPT

## 2025-04-15 RX ORDER — GADOBUTROL 604.72 MG/ML
10 INJECTION INTRAVENOUS ONCE
Status: COMPLETED | OUTPATIENT
Start: 2025-04-15 | End: 2025-04-15

## 2025-04-15 RX ADMIN — GADOBUTROL 10 ML: 604.72 INJECTION INTRAVENOUS at 08:44

## 2025-04-15 ASSESSMENT — ACTIVITIES OF DAILY LIVING (ADL)
ADLS_ACUITY_SCORE: 57

## 2025-04-15 NOTE — PROGRESS NOTES
Writer notified Dr Pompa that patient's MRI results are finalized via staff message.  Dr Pompa replied:    Patsy Pompa MD Joki, Kellie M, RN  Can you let her know that I am comfortable increasing the Eliquis to 5 mg twice a day and we will repeat her blood counts on the next visit.     Writer called Savanna to let her know to increase her Eiquis and her blood counts will be checked at her next visit. Patient understood.  No further questions.    Sheree Herrera RN, BSN.  RN Care Coordinator    [facility]   [phone]

## 2025-04-15 NOTE — PROGRESS NOTES
Care Suites Procedure Nursing Note    Patient Information  Name: Savanna Rehman  Age: 84 year old    Procedure  Procedure: Brain MRI. Here to monitor as Savanna has a Medtronic PPM. Baseline program is VVIR .   Procedure start time: 0850  Procedure complete time: 0952 MRI scan completed.   1000 Pacemaker reprogrammed back to baseline VVIR .  Concerns/abnormal assessment: No.  If abnormal assessment, provider notified: N/A  Plan/Other: Discharge per ambulatory.    Briana Franco RN

## 2025-04-15 NOTE — TELEPHONE ENCOUNTER
Patient called to say MRI results are in MyChart. Dr Pompa notified via staff message.    Sheree Herrera, RN, BSN.  RN Care Coordinator    [facility]   [phone]

## 2025-04-16 ENCOUNTER — PATIENT OUTREACH (OUTPATIENT)
Dept: CARE COORDINATION | Facility: CLINIC | Age: 85
End: 2025-04-16
Payer: COMMERCIAL

## 2025-04-16 NOTE — PROGRESS NOTES
Clinic Care Coordination Contact    Situation: Patient chart reviewed by care coordinator.    Background: Patient is scheduled to complete enrollment in Care Coordination on 4/30/25 at 2pm. She was seen at [facility] for a brain MRI. Patient has a Medtronic PPM.     Assessment: Patient was discharged in stable condition after procedure.     Plan/Recommendations: No further action needed by Care Coordination.     Jen Gramajo RN, BSN, CPHN, CCM  [facility] Ambulatory Care Management  [facility], and [facility]  [email or web address]  Office: [phone]  Employed by [facility]

## 2025-04-16 NOTE — TELEPHONE ENCOUNTER
Patsy Pompa MD Joki, Kellie M, RN  Can you let her know that I am comfortable increasing the Eliquis to 5 mg twice a day and we will repeat her blood counts on the next visit.    Writer called patient to tell her to increase her eliquis to 5 mg twice/day.  Patient takes 2.5 mg morning and evening now.  Writer told her to double what she took morning/evening to equal daily 10 mg. Patient understood and had no further questions    Sheree Herrera RN, BSN.  RN Care Coordinator    [facility]   [phone]

## 2025-04-17 NOTE — CONFIDENTIAL NOTE
NEUROLOGY - PRE VISIT PLANNING             Referring Provider Reason for Referral Office Visit Notes   Ino Nuñez MD   [facility] Ischemic optic neuropathy of right eye  4/10/2025        IMAGING/NOTES/SCANS Status/ Location  DATE   MRI/MRA(HEAD, NECK, SPINE) Internal 4/15/2025,  9/21/2023    CT/CTA   Internal  4/15/2025,  3/16/2025,  9/2/2024 9/21/2023,  6/10/2023,  2/6/2023    EMG N/A    EEG N/A    LABS Internal    Neuropsychological testing  N/A    Additional Testing/Notes N/A      Does patient have C2C?  Year last updated Action     YES   [x]   2014   Please update at appointment if outdated more than 5 years       NO     []   N/A   Please complete C2C at appointment

## 2025-04-21 ENCOUNTER — PATIENT OUTREACH (OUTPATIENT)
Dept: ONCOLOGY | Facility: CLINIC | Age: 85
End: 2025-04-21
Payer: COMMERCIAL

## 2025-04-21 ENCOUNTER — TELEPHONE (OUTPATIENT)
Dept: FAMILY MEDICINE | Facility: CLINIC | Age: 85
End: 2025-04-21
Payer: COMMERCIAL

## 2025-04-21 NOTE — TELEPHONE ENCOUNTER
Routing to MA/TC to contact patient. Thanks!     See staff message from BATSHEVA Castro CNP:        Colette GANT RN  [facility]

## 2025-04-21 NOTE — PROGRESS NOTES
Savanna called to say she had lost vision in her right eye in early April.  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  Patient is on Eliquis, her dose was recently increased from 2.5 mg 2x/day to 5 mg 2x/day, and was wondering if she should increase her dose even more knowing she had a stroke?  Writer will forward message to provider for recommendations.    Sheree Herrera, RN, BSN.  RN Care Coordinator    [facility]   [phone]

## 2025-04-21 NOTE — TELEPHONE ENCOUNTER
5 mg is the highest dose, she would have to talk to neurology if they think its a stroke and they would do the work up and consider adding Asprin. Stroke is not managed by hematology in this case. We are monitoring the plts.

## 2025-04-21 NOTE — TELEPHONE ENCOUNTER
Pt states she does not have a ride to the clinic tomorrow. She requires 24 hours notice to get a ride from [facility].

## 2025-04-22 ENCOUNTER — TELEPHONE (OUTPATIENT)
Dept: CARDIOLOGY | Facility: CLINIC | Age: 85
End: 2025-04-22

## 2025-04-22 ENCOUNTER — VIRTUAL VISIT (OUTPATIENT)
Dept: FAMILY MEDICINE | Facility: CLINIC | Age: 85
End: 2025-04-22
Payer: COMMERCIAL

## 2025-04-22 DIAGNOSIS — H47.011 ISCHEMIC OPTIC NEUROPATHY OF RIGHT EYE: Primary | ICD-10-CM

## 2025-04-22 DIAGNOSIS — R39.9 URINARY SYMPTOM OR SIGN: ICD-10-CM

## 2025-04-22 PROBLEM — M10.9 GOUT: Status: RESOLVED | Noted: 2020-12-16 | Resolved: 2025-04-22

## 2025-04-22 PROBLEM — R79.1 SUBTHERAPEUTIC INTERNATIONAL NORMALIZED RATIO (INR): Status: RESOLVED | Noted: 2023-06-13 | Resolved: 2025-04-22

## 2025-04-22 PROBLEM — S30.0XXA HEMATOMA OF BUTTOCK: Status: RESOLVED | Noted: 2024-04-01 | Resolved: 2025-04-22

## 2025-04-22 PROBLEM — R41.0 CONFUSION ASSOCIATED WITH INFECTION: Status: RESOLVED | Noted: 2023-09-21 | Resolved: 2025-04-22

## 2025-04-22 PROBLEM — B37.2 SKIN YEAST INFECTION: Status: RESOLVED | Noted: 2023-06-26 | Resolved: 2025-04-22

## 2025-04-22 PROBLEM — A04.72 C. DIFFICILE COLITIS: Status: RESOLVED | Noted: 2022-03-18 | Resolved: 2025-04-22

## 2025-04-22 PROBLEM — R07.9 CHEST PAIN, UNSPECIFIED TYPE: Status: RESOLVED | Noted: 2023-06-13 | Resolved: 2025-04-22

## 2025-04-22 PROBLEM — I48.91 ATRIAL FIBRILLATION, UNSPECIFIED TYPE (H): Status: RESOLVED | Noted: 2023-06-26 | Resolved: 2025-04-22

## 2025-04-22 PROBLEM — D72.9 ABNORMAL WBC COUNT: Status: RESOLVED | Noted: 2024-04-12 | Resolved: 2025-04-22

## 2025-04-22 PROBLEM — D62 ACUTE POSTHEMORRHAGIC ANEMIA: Status: RESOLVED | Noted: 2023-06-19 | Resolved: 2025-04-22

## 2025-04-22 PROBLEM — K57.20 DIVERTICULITIS OF LARGE INTESTINE WITH ABSCESS, UNSPECIFIED BLEEDING STATUS: Status: RESOLVED | Noted: 2024-04-12 | Resolved: 2025-04-22

## 2025-04-22 PROBLEM — S70.11XD CONTUSION OF RIGHT THIGH, SUBSEQUENT ENCOUNTER: Status: RESOLVED | Noted: 2023-06-19 | Resolved: 2025-04-22

## 2025-04-22 PROBLEM — R07.89 RIGHT-SIDED CHEST WALL PAIN: Status: RESOLVED | Noted: 2023-06-19 | Resolved: 2025-04-22

## 2025-04-22 PROBLEM — R19.7 DIARRHEA: Status: RESOLVED | Noted: 2023-08-28 | Resolved: 2025-04-22

## 2025-04-22 PROBLEM — K57.20 COLONIC DIVERTICULAR ABSCESS: Status: RESOLVED | Noted: 2024-04-01 | Resolved: 2025-04-22

## 2025-04-22 PROBLEM — K52.9 CHRONIC DIARRHEA: Status: RESOLVED | Noted: 2024-04-12 | Resolved: 2025-04-22

## 2025-04-22 PROBLEM — S30.0XXD: Status: RESOLVED | Noted: 2023-06-26 | Resolved: 2025-04-22

## 2025-04-22 PROBLEM — K57.92 DIVERTICULITIS: Status: RESOLVED | Noted: 2024-04-01 | Resolved: 2025-04-22

## 2025-04-22 PROBLEM — M89.8X8 BONY PELVIC PAIN: Status: RESOLVED | Noted: 2022-07-06 | Resolved: 2025-04-22

## 2025-04-22 PROBLEM — Z79.01 ANTICOAGULATED ON COUMADIN: Status: RESOLVED | Noted: 2024-04-12 | Resolved: 2025-04-22

## 2025-04-22 PROBLEM — S30.0XXD: Status: RESOLVED | Noted: 2023-06-13 | Resolved: 2025-04-22

## 2025-04-22 PROBLEM — D64.9 ANEMIA: Status: RESOLVED | Noted: 2020-12-16 | Resolved: 2025-04-22

## 2025-04-22 PROBLEM — B99.9 CONFUSION ASSOCIATED WITH INFECTION: Status: RESOLVED | Noted: 2023-09-21 | Resolved: 2025-04-22

## 2025-04-22 PROBLEM — D62 ANEMIA DUE TO BLOOD LOSS, ACUTE: Status: RESOLVED | Noted: 2023-06-13 | Resolved: 2025-04-22

## 2025-04-22 PROBLEM — B37.2 CANDIDIASIS OF SKIN: Status: RESOLVED | Noted: 2019-08-13 | Resolved: 2025-04-22

## 2025-04-22 PROBLEM — Z79.01 LONG TERM CURRENT USE OF ANTICOAGULANT THERAPY: Status: RESOLVED | Noted: 2023-06-26 | Resolved: 2025-04-22

## 2025-04-22 PROBLEM — R21 RASH AND NONSPECIFIC SKIN ERUPTION: Status: RESOLVED | Noted: 2024-04-15 | Resolved: 2025-04-22

## 2025-04-22 PROCEDURE — 98015 SYNCH AUDIO-ONLY EST HIGH 40: CPT | Performed by: NURSE PRACTITIONER

## 2025-04-22 PROCEDURE — 1111F DSCHRG MED/CURRENT MED MERGE: CPT | Mod: 93 | Performed by: NURSE PRACTITIONER

## 2025-04-22 NOTE — PATIENT INSTRUCTIONS
Take your meds as prescribed.    Scripts / refills sent to your pharmacy.     No new meds today.     To Urgent Care or ER if persistent urinary symptoms or feeling ill.      See me next week or sooner as needed.     Follow-up sooner if any changes or worsening symptoms.      Call or MyChart as needed as well.

## 2025-04-22 NOTE — PROGRESS NOTES
Savanna is a 84 year old who is being evaluated via a billable telephone visit.    What phone number would you like to be contacted at? [phone]  How would you like to obtain your AVS? MyChart  Originating Location (pt. Location): Home    Distant Location (provider location):  On-site  Telephone visit completed due to the patient did not have access to video, while the distant provider did.        Assessment & Plan   (H47.011) Ischemic optic neuropathy of right eye  (primary encounter diagnosis)  Comment: New onset.  Will see Neurology on 5-5-25.      (R39.9) Urinary symptom or sign  Comment: Chronic.  Feels like she could be at her baseline.  Nothing has changed.  Would like urine check in near future.        Patient Instructions   Take your meds as prescribed.    Scripts / refills sent to your pharmacy.     No new meds today.     To Urgent Care or ER if persistent urinary symptoms or feeling ill.      See me next week or sooner as needed.     Follow-up sooner if any changes or worsening symptoms.      Call or MyChart as needed as well.         The longitudinal plan of care for the diagnosis(es)/condition(s) as documented were addressed during this visit. Due to the added complexity in care, I will continue to support Savanna in the subsequent management and with ongoing continuity of care.      MED REC REQUIRED  Post Medication Reconciliation Status: discharge medications reconciled and changed, per note/orders        Subjective   Savanna is a 84 year old, presenting for the following health issues:  Hospital F/U, Establish Care, and Recheck Medication (The eliquis did increase 2 tabs in the am and 2 tabs in the evening)        4/22/2025     3:23 PM   Additional Questions   Roomed by Kasey BRAR    ED/UC Followup:  Facility:  [facility]  Date of visit: 4/10/25  Reason for visit: stroke symptoms-Ischemic optic neuropathy of right eye and UTI   Current Status: still having sx of UTI, no  fevers    Had MRI on 4-15-25; results noted below.  Will see Neurology on 5-5-25.  Will discuss Eliquis at that time.  Doing okay.  No increased symptoms or issues.      Will see Dr. Waller in Cardiology on 4-29-25.  Follow-up A-fib and heart failure and BP check.      Narrative & Impression   EXAM: MR BRAIN W/O and W CONTRAST  LOCATION: [facility]  DATE: 4/15/2025     INDICATION: Right ischemic optic neuropathy. MRI to rule out acute strokes  COMPARISON: CT 04/10/2025  CONTRAST: 10 mL Gadavist  TECHNIQUE: Routine multiplanar multisequence head MRI without and with intravenous contrast.     FINDINGS:  INTRACRANIAL CONTENTS: No acute or subacute infarct. No mass, acute hemorrhage, or extra-axial fluid collections. Confluent nonspecific T2/FLAIR hyperintensities within the cerebral white matter most consistent with advanced microvascular ischemic   change. Mild to moderate generalized cerebral atrophy. Normal ventricles and sulci. Normal position of the cerebellar tonsils. No pathologic contrast enhancement.     SELLA: No abnormality accounting for technique.     OSSEOUS STRUCTURES/SOFT TISSUES: Normal marrow signal. The major intracranial vascular flow voids are maintained.      ORBITS: No abnormality accounting for technique.      SINUSES/MASTOIDS: No paranasal sinus mucosal disease. No middle ear or mastoid effusion.                                                                       IMPRESSION:  1.  No acute intracranial pathology. No abnormal contrast enhancement.  2.  Chronic small vessel ischemic disease and generalized cerebral atrophy.        Feels discomfort in her bladder and not sure if truly a bladder infection.  No dysuria.  Has mild urinary frequency.  No vaginal discharge.  Somewhat baseline symptoms for her.  Nothing new.  Can't make it in for labs due to transportation issues.           Review of Systems  Constitutional, neuro, ENT, endocrine, pulmonary, cardiac,  gastrointestinal, genitourinary, musculoskeletal, integument and psychiatric systems are negative, except as otherwise noted.        Objective       Vitals:  No vitals were obtained today due to virtual visit.    Physical Exam   General: Alert and no distress //Respiratory: No audible wheeze, cough, or shortness of breath // Psychiatric:  Appropriate affect, tone, and pace of words.  Good conversation.  Easy breathing.      Reviewed meds, history, and labs today.  Greater than 50% of this time was used for chart review and medications review.          Phone call duration: 45 minutes      Signed Electronically by: BATSHEVA Joel CNP

## 2025-04-22 NOTE — PROGRESS NOTES
"Writer spoke with patient about her eliquis dose.  Per Dr Pompa \"5 mg is the highest dose, she would have to talk to neurology if they think its a stroke and they would do the work up and consider adding Asprin. Stroke is not managed by hematology in this case. We are monitoring the plts\"  Patient understood and stated she has an appointment with neurology coming up.  She had no further questions.      Sheree Herrera, RN, BSN.  RN Care Coordinator    [facility]   [phone]    "

## 2025-04-22 NOTE — TELEPHONE ENCOUNTER
Patient has future OV 4/29/25 with Dr. Waller, patient advised that she can discuss with Dr. Waller at that time.  Veronica Dominguez RN on 4/22/2025 at 2:35 PM

## 2025-04-22 NOTE — TELEPHONE ENCOUNTER
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      Action Taken: Other: cardio    Travel Screening: Not Applicable     Date of Service:

## 2025-04-23 ENCOUNTER — APPOINTMENT (OUTPATIENT)
Dept: CT IMAGING | Facility: CLINIC | Age: 85
DRG: 556 | End: 2025-04-23
Attending: STUDENT IN AN ORGANIZED HEALTH CARE EDUCATION/TRAINING PROGRAM
Payer: COMMERCIAL

## 2025-04-23 ENCOUNTER — HOSPITAL ENCOUNTER (INPATIENT)
Facility: CLINIC | Age: 85
DRG: 556 | End: 2025-04-23
Attending: STUDENT IN AN ORGANIZED HEALTH CARE EDUCATION/TRAINING PROGRAM | Admitting: HOSPITALIST
Payer: COMMERCIAL

## 2025-04-23 DIAGNOSIS — N39.0 URINARY TRACT INFECTION WITHOUT HEMATURIA, SITE UNSPECIFIED: Primary | ICD-10-CM

## 2025-04-23 DIAGNOSIS — R51.9 ACUTE INTRACTABLE HEADACHE, UNSPECIFIED HEADACHE TYPE: ICD-10-CM

## 2025-04-23 LAB
ALBUMIN UR-MCNC: NEGATIVE MG/DL
ANION GAP SERPL CALCULATED.3IONS-SCNC: 12 MMOL/L (ref 7–15)
APPEARANCE UR: ABNORMAL
APTT PPP: 34 SECONDS (ref 22–38)
ATRIAL RATE - MUSE: 64 BPM
BACTERIA #/AREA URNS HPF: ABNORMAL /HPF
BASOPHILS # BLD AUTO: 0.1 10E3/UL (ref 0–0.2)
BASOPHILS NFR BLD AUTO: 1 %
BILIRUB UR QL STRIP: NEGATIVE
BUN SERPL-MCNC: 18.1 MG/DL (ref 8–23)
CALCIUM SERPL-MCNC: 9.3 MG/DL (ref 8.8–10.4)
CHLORIDE SERPL-SCNC: 95 MMOL/L (ref 98–107)
COLOR UR AUTO: YELLOW
CREAT SERPL-MCNC: 1.4 MG/DL (ref 0.51–0.95)
DIASTOLIC BLOOD PRESSURE - MUSE: NORMAL MMHG
EGFRCR SERPLBLD CKD-EPI 2021: 37 ML/MIN/1.73M2
EOSINOPHIL # BLD AUTO: 0.1 10E3/UL (ref 0–0.7)
EOSINOPHIL NFR BLD AUTO: 2 %
ERYTHROCYTE [DISTWIDTH] IN BLOOD BY AUTOMATED COUNT: 13.5 % (ref 10–15)
GLUCOSE BLDC GLUCOMTR-MCNC: 120 MG/DL (ref 70–99)
GLUCOSE BLDC GLUCOMTR-MCNC: 136 MG/DL (ref 70–99)
GLUCOSE BLDC GLUCOMTR-MCNC: 160 MG/DL (ref 70–99)
GLUCOSE BLDC GLUCOMTR-MCNC: 281 MG/DL (ref 70–99)
GLUCOSE SERPL-MCNC: 153 MG/DL (ref 70–99)
GLUCOSE UR STRIP-MCNC: NEGATIVE MG/DL
HCO3 SERPL-SCNC: 29 MMOL/L (ref 22–29)
HCT VFR BLD AUTO: 38.6 % (ref 35–47)
HGB BLD-MCNC: 13.5 G/DL (ref 11.7–15.7)
HGB UR QL STRIP: ABNORMAL
IMM GRANULOCYTES # BLD: 0 10E3/UL
IMM GRANULOCYTES NFR BLD: 0 %
INR PPP: 1.44 (ref 0.85–1.15)
INTERPRETATION ECG - MUSE: NORMAL
KETONES UR STRIP-MCNC: NEGATIVE MG/DL
LEUKOCYTE ESTERASE UR QL STRIP: ABNORMAL
LYMPHOCYTES # BLD AUTO: 1.8 10E3/UL (ref 0.8–5.3)
LYMPHOCYTES NFR BLD AUTO: 28 %
MCH RBC QN AUTO: 31.5 PG (ref 26.5–33)
MCHC RBC AUTO-ENTMCNC: 35 G/DL (ref 31.5–36.5)
MCV RBC AUTO: 90 FL (ref 78–100)
MONOCYTES # BLD AUTO: 0.8 10E3/UL (ref 0–1.3)
MONOCYTES NFR BLD AUTO: 13 %
MUCOUS THREADS #/AREA URNS LPF: PRESENT /LPF
NEUTROPHILS # BLD AUTO: 3.4 10E3/UL (ref 1.6–8.3)
NEUTROPHILS NFR BLD AUTO: 55 %
NITRATE UR QL: NEGATIVE
NRBC # BLD AUTO: 0 10E3/UL
NRBC BLD AUTO-RTO: 0 /100
P AXIS - MUSE: NORMAL DEGREES
PH UR STRIP: 6.5 [PH] (ref 5–7)
PLATELET # BLD AUTO: 127 10E3/UL (ref 150–450)
POTASSIUM SERPL-SCNC: 3.8 MMOL/L (ref 3.4–5.3)
PR INTERVAL - MUSE: NORMAL MS
QRS DURATION - MUSE: 114 MS
QT - MUSE: 452 MS
QTC - MUSE: 462 MS
R AXIS - MUSE: -23 DEGREES
RBC # BLD AUTO: 4.28 10E6/UL (ref 3.8–5.2)
RBC URINE: 4 /HPF
SODIUM SERPL-SCNC: 136 MMOL/L (ref 135–145)
SP GR UR STRIP: 1.01 (ref 1–1.03)
SQUAMOUS EPITHELIAL: 2 /HPF
SYSTOLIC BLOOD PRESSURE - MUSE: NORMAL MMHG
T AXIS - MUSE: -34 DEGREES
TROPONIN T SERPL HS-MCNC: 20 NG/L
TROPONIN T SERPL HS-MCNC: 22 NG/L
UROBILINOGEN UR STRIP-MCNC: 6 MG/DL
VENTRICULAR RATE- MUSE: 63 BPM
WBC # BLD AUTO: 6.2 10E3/UL (ref 4–11)
WBC CLUMPS #/AREA URNS HPF: PRESENT /HPF
WBC URINE: >182 /HPF

## 2025-04-23 PROCEDURE — 99207 PR NO BILLABLE SERVICE THIS VISIT: CPT | Performed by: NURSE PRACTITIONER

## 2025-04-23 PROCEDURE — 250N000013 HC RX MED GY IP 250 OP 250 PS 637: Performed by: PHYSICIAN ASSISTANT

## 2025-04-23 PROCEDURE — 99285 EMERGENCY DEPT VISIT HI MDM: CPT | Mod: 25

## 2025-04-23 PROCEDURE — 87186 SC STD MICRODIL/AGAR DIL: CPT | Performed by: STUDENT IN AN ORGANIZED HEALTH CARE EDUCATION/TRAINING PROGRAM

## 2025-04-23 PROCEDURE — 96374 THER/PROPH/DIAG INJ IV PUSH: CPT | Mod: 59

## 2025-04-23 PROCEDURE — 96376 TX/PRO/DX INJ SAME DRUG ADON: CPT

## 2025-04-23 PROCEDURE — 84484 ASSAY OF TROPONIN QUANT: CPT | Performed by: STUDENT IN AN ORGANIZED HEALTH CARE EDUCATION/TRAINING PROGRAM

## 2025-04-23 PROCEDURE — 250N000011 HC RX IP 250 OP 636: Performed by: STUDENT IN AN ORGANIZED HEALTH CARE EDUCATION/TRAINING PROGRAM

## 2025-04-23 PROCEDURE — 80048 BASIC METABOLIC PNL TOTAL CA: CPT | Performed by: STUDENT IN AN ORGANIZED HEALTH CARE EDUCATION/TRAINING PROGRAM

## 2025-04-23 PROCEDURE — 81001 URINALYSIS AUTO W/SCOPE: CPT | Performed by: STUDENT IN AN ORGANIZED HEALTH CARE EDUCATION/TRAINING PROGRAM

## 2025-04-23 PROCEDURE — 85610 PROTHROMBIN TIME: CPT | Performed by: STUDENT IN AN ORGANIZED HEALTH CARE EDUCATION/TRAINING PROGRAM

## 2025-04-23 PROCEDURE — 258N000003 HC RX IP 258 OP 636: Performed by: STUDENT IN AN ORGANIZED HEALTH CARE EDUCATION/TRAINING PROGRAM

## 2025-04-23 PROCEDURE — 250N000009 HC RX 250: Performed by: STUDENT IN AN ORGANIZED HEALTH CARE EDUCATION/TRAINING PROGRAM

## 2025-04-23 PROCEDURE — 70450 CT HEAD/BRAIN W/O DYE: CPT

## 2025-04-23 PROCEDURE — G0378 HOSPITAL OBSERVATION PER HR: HCPCS

## 2025-04-23 PROCEDURE — 93005 ELECTROCARDIOGRAM TRACING: CPT

## 2025-04-23 PROCEDURE — 250N000013 HC RX MED GY IP 250 OP 250 PS 637: Performed by: STUDENT IN AN ORGANIZED HEALTH CARE EDUCATION/TRAINING PROGRAM

## 2025-04-23 PROCEDURE — 96361 HYDRATE IV INFUSION ADD-ON: CPT

## 2025-04-23 PROCEDURE — 250N000013 HC RX MED GY IP 250 OP 250 PS 637: Performed by: HOSPITALIST

## 2025-04-23 PROCEDURE — 36415 COLL VENOUS BLD VENIPUNCTURE: CPT | Performed by: STUDENT IN AN ORGANIZED HEALTH CARE EDUCATION/TRAINING PROGRAM

## 2025-04-23 PROCEDURE — 250N000011 HC RX IP 250 OP 636: Performed by: HOSPITALIST

## 2025-04-23 PROCEDURE — 85730 THROMBOPLASTIN TIME PARTIAL: CPT | Performed by: STUDENT IN AN ORGANIZED HEALTH CARE EDUCATION/TRAINING PROGRAM

## 2025-04-23 PROCEDURE — 96375 TX/PRO/DX INJ NEW DRUG ADDON: CPT

## 2025-04-23 PROCEDURE — 70498 CT ANGIOGRAPHY NECK: CPT

## 2025-04-23 PROCEDURE — 85004 AUTOMATED DIFF WBC COUNT: CPT | Performed by: STUDENT IN AN ORGANIZED HEALTH CARE EDUCATION/TRAINING PROGRAM

## 2025-04-23 PROCEDURE — 82962 GLUCOSE BLOOD TEST: CPT

## 2025-04-23 PROCEDURE — 99222 1ST HOSP IP/OBS MODERATE 55: CPT | Performed by: HOSPITALIST

## 2025-04-23 RX ORDER — DIPHENHYDRAMINE HCL 25 MG
12.5 TABLET ORAL ONCE
Status: DISCONTINUED | OUTPATIENT
Start: 2025-04-23 | End: 2025-04-23

## 2025-04-23 RX ORDER — NALOXONE HYDROCHLORIDE 0.4 MG/ML
0.2 INJECTION, SOLUTION INTRAMUSCULAR; INTRAVENOUS; SUBCUTANEOUS
Status: DISCONTINUED | OUTPATIENT
Start: 2025-04-23 | End: 2025-04-26 | Stop reason: HOSPADM

## 2025-04-23 RX ORDER — FENTANYL CITRATE 50 UG/ML
50 INJECTION, SOLUTION INTRAMUSCULAR; INTRAVENOUS ONCE
Status: COMPLETED | OUTPATIENT
Start: 2025-04-23 | End: 2025-04-23

## 2025-04-23 RX ORDER — CEFTRIAXONE 2 G/1
2 INJECTION, POWDER, FOR SOLUTION INTRAMUSCULAR; INTRAVENOUS ONCE
Status: DISCONTINUED | OUTPATIENT
Start: 2025-04-23 | End: 2025-04-23

## 2025-04-23 RX ORDER — ONDANSETRON 4 MG/1
4 TABLET, ORALLY DISINTEGRATING ORAL EVERY 6 HOURS PRN
Status: DISCONTINUED | OUTPATIENT
Start: 2025-04-23 | End: 2025-04-26 | Stop reason: HOSPADM

## 2025-04-23 RX ORDER — AMOXICILLIN 250 MG
1 CAPSULE ORAL 2 TIMES DAILY PRN
Status: DISCONTINUED | OUTPATIENT
Start: 2025-04-23 | End: 2025-04-26 | Stop reason: HOSPADM

## 2025-04-23 RX ORDER — AMOXICILLIN 250 MG
2 CAPSULE ORAL 2 TIMES DAILY PRN
Status: DISCONTINUED | OUTPATIENT
Start: 2025-04-23 | End: 2025-04-26 | Stop reason: HOSPADM

## 2025-04-23 RX ORDER — TETRACAINE HYDROCHLORIDE 5 MG/ML
2 SOLUTION OPHTHALMIC ONCE
Status: COMPLETED | OUTPATIENT
Start: 2025-04-23 | End: 2025-04-23

## 2025-04-23 RX ORDER — OMEPRAZOLE 20 MG/1
20 CAPSULE, DELAYED RELEASE ORAL DAILY
COMMUNITY

## 2025-04-23 RX ORDER — ONDANSETRON 2 MG/ML
4 INJECTION INTRAMUSCULAR; INTRAVENOUS EVERY 6 HOURS PRN
Status: DISCONTINUED | OUTPATIENT
Start: 2025-04-23 | End: 2025-04-26 | Stop reason: HOSPADM

## 2025-04-23 RX ORDER — DIPHENHYDRAMINE HCL 12.5 MG/5ML
12.5 SOLUTION ORAL ONCE
Status: COMPLETED | OUTPATIENT
Start: 2025-04-23 | End: 2025-04-23

## 2025-04-23 RX ORDER — ONDANSETRON 2 MG/ML
4 INJECTION INTRAMUSCULAR; INTRAVENOUS ONCE
Status: COMPLETED | OUTPATIENT
Start: 2025-04-23 | End: 2025-04-23

## 2025-04-23 RX ORDER — GLIPIZIDE 2.5 MG/1
2.5 TABLET, EXTENDED RELEASE ORAL 2 TIMES DAILY
Status: DISCONTINUED | OUTPATIENT
Start: 2025-04-23 | End: 2025-04-26 | Stop reason: HOSPADM

## 2025-04-23 RX ORDER — FUROSEMIDE 40 MG/1
40 TABLET ORAL DAILY
Status: DISCONTINUED | OUTPATIENT
Start: 2025-04-24 | End: 2025-04-26 | Stop reason: HOSPADM

## 2025-04-23 RX ORDER — NALOXONE HYDROCHLORIDE 0.4 MG/ML
0.4 INJECTION, SOLUTION INTRAMUSCULAR; INTRAVENOUS; SUBCUTANEOUS
Status: DISCONTINUED | OUTPATIENT
Start: 2025-04-23 | End: 2025-04-26 | Stop reason: HOSPADM

## 2025-04-23 RX ORDER — IOPAMIDOL 755 MG/ML
67 INJECTION, SOLUTION INTRAVASCULAR ONCE
Status: COMPLETED | OUTPATIENT
Start: 2025-04-23 | End: 2025-04-23

## 2025-04-23 RX ORDER — CEFTRIAXONE 1 G/1
1 INJECTION, POWDER, FOR SOLUTION INTRAMUSCULAR; INTRAVENOUS EVERY 24 HOURS
Status: DISCONTINUED | OUTPATIENT
Start: 2025-04-23 | End: 2025-04-25

## 2025-04-23 RX ORDER — METOPROLOL SUCCINATE 25 MG/1
25 TABLET, EXTENDED RELEASE ORAL DAILY
Status: DISCONTINUED | OUTPATIENT
Start: 2025-04-24 | End: 2025-04-26 | Stop reason: HOSPADM

## 2025-04-23 RX ORDER — PANTOPRAZOLE SODIUM 40 MG/1
40 TABLET, DELAYED RELEASE ORAL DAILY
Status: DISCONTINUED | OUTPATIENT
Start: 2025-04-23 | End: 2025-04-26 | Stop reason: HOSPADM

## 2025-04-23 RX ORDER — METOCLOPRAMIDE HYDROCHLORIDE 5 MG/ML
5 INJECTION INTRAMUSCULAR; INTRAVENOUS ONCE
Status: COMPLETED | OUTPATIENT
Start: 2025-04-23 | End: 2025-04-23

## 2025-04-23 RX ORDER — ACETAMINOPHEN 500 MG
1000 TABLET ORAL ONCE
Status: COMPLETED | OUTPATIENT
Start: 2025-04-23 | End: 2025-04-23

## 2025-04-23 RX ORDER — PROCHLORPERAZINE MALEATE 5 MG/1
5 TABLET ORAL EVERY 6 HOURS PRN
Status: DISCONTINUED | OUTPATIENT
Start: 2025-04-23 | End: 2025-04-26 | Stop reason: HOSPADM

## 2025-04-23 RX ORDER — BUTALBITAL, ASPIRIN, AND CAFFEINE 325; 50; 40 MG/1; MG/1; MG/1
1 CAPSULE ORAL EVERY 8 HOURS PRN
Status: DISCONTINUED | OUTPATIENT
Start: 2025-04-23 | End: 2025-04-26 | Stop reason: HOSPADM

## 2025-04-23 RX ORDER — MEPERIDINE HYDROCHLORIDE 25 MG/ML
25 INJECTION INTRAMUSCULAR; INTRAVENOUS; SUBCUTANEOUS EVERY 4 HOURS PRN
Status: DISCONTINUED | OUTPATIENT
Start: 2025-04-23 | End: 2025-04-24

## 2025-04-23 RX ORDER — DIAZEPAM 5 MG/1
5 TABLET ORAL EVERY 8 HOURS PRN
Status: DISCONTINUED | OUTPATIENT
Start: 2025-04-23 | End: 2025-04-26 | Stop reason: HOSPADM

## 2025-04-23 RX ORDER — CEFTRIAXONE 1 G/1
1 INJECTION, POWDER, FOR SOLUTION INTRAMUSCULAR; INTRAVENOUS EVERY 24 HOURS
Status: DISCONTINUED | OUTPATIENT
Start: 2025-04-24 | End: 2025-04-23

## 2025-04-23 RX ORDER — METHOCARBAMOL 500 MG/1
250 TABLET ORAL 3 TIMES DAILY PRN
Status: DISCONTINUED | OUTPATIENT
Start: 2025-04-23 | End: 2025-04-24

## 2025-04-23 RX ADMIN — SODIUM CHLORIDE 100 ML: 9 INJECTION, SOLUTION INTRAVENOUS at 10:05

## 2025-04-23 RX ADMIN — METHOCARBAMOL 250 MG: 500 TABLET ORAL at 21:19

## 2025-04-23 RX ADMIN — METOCLOPRAMIDE 5 MG: 5 INJECTION, SOLUTION INTRAMUSCULAR; INTRAVENOUS at 11:16

## 2025-04-23 RX ADMIN — GLIPIZIDE 2.5 MG: 2.5 TABLET, EXTENDED RELEASE ORAL at 20:38

## 2025-04-23 RX ADMIN — Medication 5 MG: at 21:30

## 2025-04-23 RX ADMIN — APIXABAN 5 MG: 5 TABLET, FILM COATED ORAL at 20:38

## 2025-04-23 RX ADMIN — TETRACAINE HYDROCHLORIDE 2 DROP: 5 SOLUTION OPHTHALMIC at 11:19

## 2025-04-23 RX ADMIN — METHOCARBAMOL 250 MG: 500 TABLET ORAL at 18:34

## 2025-04-23 RX ADMIN — DIPHENHYDRAMINE HYDROCHLORIDE 12.5 MG: 25 SOLUTION ORAL at 11:18

## 2025-04-23 RX ADMIN — FENTANYL CITRATE 50 MCG: 50 INJECTION, SOLUTION INTRAMUSCULAR; INTRAVENOUS at 12:46

## 2025-04-23 RX ADMIN — DIAZEPAM 5 MG: 5 TABLET ORAL at 16:11

## 2025-04-23 RX ADMIN — CEFTRIAXONE 1 G: 1 INJECTION, POWDER, FOR SOLUTION INTRAMUSCULAR; INTRAVENOUS at 16:11

## 2025-04-23 RX ADMIN — ONDANSETRON 4 MG: 2 INJECTION, SOLUTION INTRAMUSCULAR; INTRAVENOUS at 10:18

## 2025-04-23 RX ADMIN — FENTANYL CITRATE 50 MCG: 50 INJECTION, SOLUTION INTRAMUSCULAR; INTRAVENOUS at 10:19

## 2025-04-23 RX ADMIN — IOPAMIDOL 67 ML: 755 INJECTION, SOLUTION INTRAVENOUS at 10:04

## 2025-04-23 RX ADMIN — SODIUM CHLORIDE 500 ML: 9 INJECTION, SOLUTION INTRAVENOUS at 11:15

## 2025-04-23 ASSESSMENT — ACTIVITIES OF DAILY LIVING (ADL)
ADLS_ACUITY_SCORE: 37
ADLS_ACUITY_SCORE: 37
ADLS_ACUITY_SCORE: 57
ADLS_ACUITY_SCORE: 37
ADLS_ACUITY_SCORE: 37
ADLS_ACUITY_SCORE: 57
ADLS_ACUITY_SCORE: 37
ADLS_ACUITY_SCORE: 40
ADLS_ACUITY_SCORE: 57
ADLS_ACUITY_SCORE: 37
ADLS_ACUITY_SCORE: 37

## 2025-04-23 NOTE — H&P
[facility]    History and Physical - Hospitalist Service       Date of Admission:  4/23/2025    Assessment & Plan      Savanna Rehman is a 84 year old female with ITP, DM, FERREIRA cirrhosis, CRISTIANA, a fib, CAD, HFpEF, CKD, bradycardia, pacemaker, diverticulitis in the past admitted on 4/23/2025 with neck pain/spasm causing headache.    Neck pain  Headache    - patient describes neck pain going to her head    - states she could not turn her head this am    - ED was concerned with SAH and had imaging done (neg) and called stroke Neuro    - treat spasm: valium, robaxin, heat, neck brace    Recent ischemic neuropathy of R eye    - stable    - had full work-up    - cont apixaban    UTI    - UA appears dirty    - endorses recent UTI treated with Keflex (was E Coli)    - endorses low abdo discomfort    - ceftriaxone ordered in the ED    - wait for culture    ITP    - stable    - follows Dr Aletha MORGAN  HFpEF    - stable, cont meds (lasix, toprol)    Atrial fibrillation  Pacemaker    - rate controlled    - cont Toprol    - cont eliquis    DM    - cont glipizide    - last Hb A1c was 6.6. on 1/31/2025    - no need to check sugars here    CRISTIANA    - does not use CPAP    DNR/DNI: per patient/daughter    Daughter updated in room       Observation Goals: -diagnostic tests and consults completed and resulted, -vital signs normal or at patient baseline, Nurse to notify provider when observation goals have been met and patient is ready for discharge.  Diet: Moderate Consistent Carb (60 g CHO per Meal) Diet    DVT Prophylaxis: DOAC  Aguilar Catheter: Not present  Lines: None     Cardiac Monitoring: None  Code Status: No CPR- Do NOT Intubate      Clinically Significant Risk Factors Present on Admission          # Hypochloremia: Lowest Cl = 95 mmol/L in last 2 days, will monitor as appropriate       # Drug Induced Coagulation Defect: home medication list includes an anticoagulant medication  # Thrombocytopenia: Lowest  "platelets = 127 in last 2 days, will monitor for bleeding    # Chronic heart failure with preserved ejection fraction: heart failure noted on problem list and last echo with EF >50%         # DMII: A1C = 6.6 % (Ref range: 0.0 - 5.6 %) within past 6 months    # Obesity: Estimated body mass index is 32.19 kg/m  as calculated from the following:    Height as of 4/10/25: 1.803 m (5' 11\").    Weight as of this encounter: 104.7 kg (230 lb 13.2 oz).       # Financial/Environmental Concerns:     # Pacemaker present       Disposition Plan     Medically Ready for Discharge: Anticipated Tomorrow           Hardik Doshi MD  Hospitalist Service  [facility]  Securely message with [employer] (more info)  Text page via [employer] Paging/Directory     ______________________________________________________________________    Chief Complaint   Neck pain    History is obtained from the patient    History of Present Illness   Savanna Rehman is a 84 year old female with ITP, DM, FERREIRA cirrhosis, CRISTIANA, a fib, CAD, HFpEF, CKD, bradycardia, pacemaker, diverticulitis in the past admitted on 4/23/2025 with neck pain/spasm causing headache.  Patient states she was in her normal state of health last night.  This morning she woke up and states that she could not move her neck due to pain.  She states she has bilateral neck pain.  She states it radiates up her scalp into her head.  She has a mild frontal headache as well.  She denies chest pain, shortness of breath, abdominal pain, nausea, vomiting, diarrhea.  No recent cough, runny nose, sore throat.  No fevers or chills.  No visual changes.  She states she still has some low bladder discomfort.  She states she was treated for UTI a couple weeks ago      Past Medical History    Past Medical History:   Diagnosis Date    Abnormal WBC count 04/12/2024    Acute encephalopathy 09/21/2023    Acute posthemorrhagic anemia 06/19/2023    Anemia     Iron Deficiency anemia    Anemia due to " blood loss, acute 06/13/2023    Anticoagulated on Coumadin 04/12/2024    Atrial fibrillation (H)     Atrial fibrillation, unspecified type (H) 06/26/2023    Bony pelvic pain 07/06/2022    C. difficile colitis, recurrent -- she needs to take Vanco 125 bid anytime she is on a different Abx  03/18/2022    CAD (coronary artery disease)     non-obstructive    Candidiasis of skin 08/13/2019    Chest pain, unspecified type 06/13/2023    Chronic diarrhea 04/12/2024    Chronic pain     neck, low back, legs    Colonic diverticular abscess 04/01/2024    Confusion associated with infection 09/21/2023    Congestive heart failure (H)     Contusion of lower back and pelvis, subsequent encounter 06/26/2023    Contusion of right thigh, subsequent encounter 06/19/2023    Degenerative disk disease     Diabetes (H)     Diarrhea 08/28/2023    Diverticulitis 04/01/2024    Diverticulitis of large intestine with abscess, unspecified bleeding status 04/12/2024    Fibromyalgia     Gastro-oesophageal reflux disease     Gout     Hematoma of buttock 04/01/2024    Hiatal hernia     Iron deficiency anemia 11/19/2014    Long term current use of anticoagulant therapy 06/26/2023    Mumps     Neuropathy     Noninfectious gastroenteritis 08/14/2015    CRISTIANA (obstructive sleep apnea) - CPAP     Palpitations     Pernicious anemia     Rash and nonspecific skin eruption 04/15/2024    Right-sided chest wall pain 06/19/2023    Skin yeast infection 06/26/2023    Sleep apnea     uses CPAP.    Subtherapeutic international normalized ratio (INR) 06/13/2023    Traumatic hematoma of buttock, subsequent encounter 06/13/2023    Urinary incontinence     Vitamin D deficiency        Past Surgical History   Past Surgical History:   Procedure Laterality Date    APPENDECTOMY      CHOLECYSTECTOMY      COLONOSCOPY  3/15/2011    COLONOSCOPY N/A 3/15/2023    Procedure: COLONOSCOPY, WITH POLYPECTOMY AND BIOPSY;  Surgeon: Maci Coronel MD;  Location:  GI    COLONOSCOPY  N/A 3/15/2023    Procedure: COLONOSCOPY, FLEXIBLE, WITH LESION REMOVAL USING SNARE;  Surgeon: Maci Coronel MD;  Location:  GI    CORONARY ANGIOGRAPHY ADULT ORDER      CYSTOSCOPY, BIOPSY BLADDER, COMBINED N/A 7/13/2020    Procedure: CYSTOSCOPY, WITH BLADDER BIOPSY;  Surgeon: Deisy Wilson MD;  Location: [facility] OR    EP PACEMAKER DEVICE & LEAD IMPLANT- RIGHT ATRIAL & RIGHT VENTRICULAR Left 4/5/2024    Procedure: Pacemaker Device & Lead Implant - Right Atrial & Right Ventricular;  Surgeon: Raul Plunkett MD;  Location:  HEART CARDIAC CATH LAB    ESOPHAGOSCOPY, GASTROSCOPY, DUODENOSCOPY (EGD), COMBINED N/A 8/12/2024    Procedure: Esophagogastroduodenoscopy;  Surgeon: Mohan Mcguire MD;  Location: [facility] OR    HEART CATH LEFT HEART CATH  12/30/16    medication management    HYSTERECTOMY TOTAL ABDOMINAL      Knee replacement NOS Left     LAPAROSCOPIC NISSEN FUNDOPLICATION N/A 2/4/2015    Procedure: LAPAROSCOPIC NISSEN FUNDOPLICATION;  Surgeon: Armando Ansari MD;  Location:  OR    TONSILLECTOMY      TRANSPOSITION ULNAR NERVE (ELBOW)         Prior to Admission Medications   Prior to Admission Medications   Prescriptions Last Dose Informant Patient Reported? Taking?   EPINEPHrine (ANY BX GENERIC EQUIV) 0.3 MG/0.3ML injection 2-pack   No Yes   Sig: Inject 0.3 mLs (0.3 mg) into the muscle as needed for anaphylaxis May repeat one time in 5-15 minutes if response to initial dose is inadequate.   apixaban ANTICOAGULANT (ELIQUIS) 2.5 MG tablet 4/23/2025 Morning  Yes Yes   Sig: Take 2 tablets (5 mg) by mouth 2 times daily.   blood glucose (NO BRAND SPECIFIED) test strip Unknown  No Yes   Sig: Use to test blood sugar one times daily or as directed. To accompany: Blood Glucose Monitor Brands: per insurance.   blood glucose monitoring (NO BRAND SPECIFIED) meter device kit Unknown  No Yes   Sig: Use to test blood sugar one times daily or as directed. Preferred blood glucose meter OR supplies to accompany: Blood Glucose  Monitor Brands: per insurance.   butalbital-aspirin-caffeine (FIORINAL) -40 MG capsule   No Yes   Sig: Take 1 capsule by mouth every 8 hours as needed for headaches.   cholecalciferol (VITAMIN D3) 10 mcg (400 units) TABS tablet 4/22/2025 Morning  Yes Yes   Sig: Take 10 mcg by mouth daily.   furosemide (LASIX) 40 MG tablet 4/22/2025 Morning  No Yes   Sig: Take 1 tablet by mouth once daily   glipiZIDE (GLUCOTROL XL) 2.5 MG 24 hr tablet 4/22/2025 Evening  No Yes   Sig: Take 1 tablet by mouth twice daily   metoprolol succinate ER (TOPROL XL) 25 MG 24 hr tablet 4/22/2025 Morning  No Yes   Sig: Take 1 tablet (25 mg) by mouth daily.   omeprazole (PRILOSEC) 20 MG DR capsule 4/22/2025 Morning  Yes Yes   Sig: Take 20 mg by mouth daily.   thin (NO BRAND SPECIFIED) lancets   No Yes   Sig: Use with lanceting device. To accompany: Blood Glucose Monitor Brands: per insurance.      Facility-Administered Medications: None        Review of Systems    The 10 point Review of Systems is negative other than noted in the HPI or here.     Social History   I have reviewed this patient's social history and updated it with pertinent information if needed.  Social History     Tobacco Use    Smoking status: Former     Current packs/day: 0.00     Average packs/day: 0.5 packs/day for 50.0 years (25.0 ttl pk-yrs)     Types: Cigarettes     Start date: 9/1/1964     Quit date: 9/1/2014     Years since quitting: 10.6     Passive exposure: Past    Smokeless tobacco: Never   Vaping Use    Vaping status: Never Used   Substance Use Topics    Alcohol use: Not Currently    Drug use: Never         Family History   I have reviewed this patient's family history and updated it with pertinent information if needed.  Family History   Problem Relation Age of Onset    Alzheimer Disease Mother     Lung Cancer Father     No Known Problems Brother     No Known Problems Brother     No Known Problems Brother     Unknown/Adopted No family hx of          Allergies    Allergies   Allergen Reactions    Augmented Betamethasone Diprop [Betamethasone] Other (See Comments)     Severe yeast infection    Petrolatum Anaphylaxis and Swelling     Rash and swelling    Shellfish-Derived Products Anaphylaxis     Tongue swelling    Bacitracin      Rash swelling    Bactrim [Sulfamethoxazole-Trimethoprim] Dizziness    Darvon [Propoxyphene] Swelling     Throat closes    Dilaudid [Hydromorphone]      No side effects from fentanyl June 2023    Levaquin [Levofloxacin] Swelling     Tongue swelling    Lidocaine      Facial swelling     Morphine Unknown     [facility] 2/23[facility]24    Neomycin Swelling     rash    Neosporin [Neomycin-Polymyxin-Gramicidin] Swelling     rash    Nitrofurantoin      SOB, GI upset,    Oxycodone      Severe itching    Percocet [Oxycodone-Acetaminophen] Unknown    Percodan [Oxycodone-Aspirin]      Severe itching    Polymyxin B     Pramoxine     Tramadol     Vicodin [Hydrocodone-Acetaminophen]      Severe itching      Xarelto [Rivaroxaban]     Adhesive Tape Rash     Band aids     Codeine Rash    Hydrocortisone Rash and Swelling    Other Environmental Allergy Rash     Adhesive tape   Band aids         Physical Exam   Vital Signs: Temp: 98.2  F (36.8  C) Temp src: Oral BP: (!) 166/73 Pulse: 66   Resp: 18 SpO2: 96 % O2 Device: None (Room air)    Weight: 230 lbs 13.15 oz    Constitutional: awake, alert, cooperative, no apparent distress, and appears stated age  Eyes: Lids and lashes normal, pupils equal, round and reactive to light, extra ocular muscles intact, sclera clear, conjunctiva normal  ENT: severe bilateral neck spasm/tenderness  Respiratory: No increased work of breathing, good air exchange, clear to auscultation bilaterally, no crackles or wheezing  Cardiovascular: Normal apical impulse, regular rate and rhythm, normal S1 and S2, no S3 or S4, and no murmur noted  GI: No scars, normal bowel sounds, soft, non-distended, non-tender, no masses palpated, no  hepatosplenomegally  Skin: no bruising or bleeding  Musculoskeletal: no lower extremity pitting edema present  Neurologic: Awake, alert, oriented to name, place and time.  Cranial nerves II-XII are grossly intact.  Motor is 5 out of 5 bilaterally.  Cerebellar finger to nose, heel to shin intact.  Sensory is intact.  Babinski down going, Romberg negative, and gait is normal.    Medical Decision Making       60 MINUTES SPENT BY ME on the date of service doing chart review, history, exam, documentation & further activities per the note.      Data     I have personally reviewed the following data over the past 24 hrs:    6.2  \   13.5   / 127 (L)     136 95 (L) 18.1 /  136 (H)   3.8 29 1.40 (H) \     Trop: 20 (H) BNP: N/A     INR:  1.44 (H) PTT:  34   D-dimer:  N/A Fibrinogen:  N/A       Imaging results reviewed over the past 24 hrs:   Recent Results (from the past 24 hours)   CT Head w/o Contrast    Narrative    EXAM: CT HEAD W/O CONTRAST, CTA HEAD NECK W CONTRAST  LOCATION: [facility]  DATE: 4/23/2025    INDICATION: Code Stroke, rule out hemorrhage and evaluate for potential thrombolysis thrombectomy.   COMPARISON: Brain MR 4/15/2025. CTA 4/10/2025.  CONTRAST: 67 mL Isovue 370.  TECHNIQUE: Head and neck CT angiogram with IV contrast. Noncontrast head CT followed by axial helical CT images of the head and neck vessels obtained during the arterial phase of intravenous contrast administration. Axial 2D reconstructed images and   multiplanar 3D MIP reconstructed images of the head and neck vessels were performed by the technologist. Dose reduction techniques were used. All stenosis measurements made according to NASCET criteria unless otherwise specified.    FINDINGS:   NONCONTRAST HEAD CT:   INTRACRANIAL CONTENTS: No acute intracranial hemorrhage. No mass effect or midline shift. Moderate presumed chronic small vessel ischemic changes. Moderate generalized volume loss. No hydrocephalus.      VISUALIZED ORBITS/SINUSES/MASTOIDS: No intraorbital abnormality. No paranasal sinus mucosal disease. No middle ear or mastoid effusion.    BONES/SOFT TISSUES: No acute abnormality.    HEAD CTA:  No vascular cutoff of the proximal major intracranial arteries involving the ACAs, MCAs, or PCAs. Moderate focal stenosis of the mid left P2 segment, similar to prior CTA 4/10/2025. Fetal origin of the left PCA, an anatomic variant and unchanged. No   intracranial aneurysm.    Small or absent left A1 segment with patent anterior communicating artery, likely an anatomic variant.    NECK CTA:  RIGHT CAROTID: Atherosclerotic plaque results in less than 50% stenosis in the right ICA. No dissection.    LEFT CAROTID: Atherosclerotic plaque results in less than 50% stenosis in the left ICA. No dissection.    VERTEBRAL ARTERIES: No focal stenosis or dissection. Balanced vertebral arteries.    AORTIC ARCH: Classic aortic arch anatomy with no significant stenosis at the origin of the great vessels.    NONVASCULAR STRUCTURES: Unremarkable.      Impression    IMPRESSION:   HEAD CT:  1.  No CT evidence for acute intracranial process.  2.  Brain atrophy and presumed chronic microvascular ischemic changes as above.    HEAD CTA:   1.  No vascular cutoff of the proximal major intracranial arteries.  2.  Moderate focal stenosis of the mid left P2 segment, also present on 4/10/2025.    NECK CTA:  1.  Patent arteries in the neck without evidence of dissection.  2.  Atherosclerotic disease along the carotid arteries without significant stenosis by NASCET criteria.    Results discussed with Dr. Jackson at 10:09 AM on 4/23/2025.   CTA Head Neck with Contrast    Narrative    EXAM: CT HEAD W/O CONTRAST, CTA HEAD NECK W CONTRAST  LOCATION: [facility]  DATE: 4/23/2025    INDICATION: Code Stroke, rule out hemorrhage and evaluate for potential thrombolysis thrombectomy.   COMPARISON: Brain MR 4/15/2025. CTA  4/10/2025.  CONTRAST: 67 mL Isovue 370.  TECHNIQUE: Head and neck CT angiogram with IV contrast. Noncontrast head CT followed by axial helical CT images of the head and neck vessels obtained during the arterial phase of intravenous contrast administration. Axial 2D reconstructed images and   multiplanar 3D MIP reconstructed images of the head and neck vessels were performed by the technologist. Dose reduction techniques were used. All stenosis measurements made according to NASCET criteria unless otherwise specified.    FINDINGS:   NONCONTRAST HEAD CT:   INTRACRANIAL CONTENTS: No acute intracranial hemorrhage. No mass effect or midline shift. Moderate presumed chronic small vessel ischemic changes. Moderate generalized volume loss. No hydrocephalus.     VISUALIZED ORBITS/SINUSES/MASTOIDS: No intraorbital abnormality. No paranasal sinus mucosal disease. No middle ear or mastoid effusion.    BONES/SOFT TISSUES: No acute abnormality.    HEAD CTA:  No vascular cutoff of the proximal major intracranial arteries involving the ACAs, MCAs, or PCAs. Moderate focal stenosis of the mid left P2 segment, similar to prior CTA 4/10/2025. Fetal origin of the left PCA, an anatomic variant and unchanged. No   intracranial aneurysm.    Small or absent left A1 segment with patent anterior communicating artery, likely an anatomic variant.    NECK CTA:  RIGHT CAROTID: Atherosclerotic plaque results in less than 50% stenosis in the right ICA. No dissection.    LEFT CAROTID: Atherosclerotic plaque results in less than 50% stenosis in the left ICA. No dissection.    VERTEBRAL ARTERIES: No focal stenosis or dissection. Balanced vertebral arteries.    AORTIC ARCH: Classic aortic arch anatomy with no significant stenosis at the origin of the great vessels.    NONVASCULAR STRUCTURES: Unremarkable.      Impression    IMPRESSION:   HEAD CT:  1.  No CT evidence for acute intracranial process.  2.  Brain atrophy and presumed chronic microvascular  ischemic changes as above.    HEAD CTA:   1.  No vascular cutoff of the proximal major intracranial arteries.  2.  Moderate focal stenosis of the mid left P2 segment, also present on 4/10/2025.    NECK CTA:  1.  Patent arteries in the neck without evidence of dissection.  2.  Atherosclerotic disease along the carotid arteries without significant stenosis by NASCET criteria.    Results discussed with Dr. Jackson at 10:09 AM on 4/23/2025.

## 2025-04-23 NOTE — PLAN OF CARE
Evening blood sugar was 231, patient had just had Arbys 30 minutes before this was taken. Still has a lot of muscle spasms in her neck. The soft collar did not work out for her. PRN valium and robaxin have been given.

## 2025-04-23 NOTE — PHARMACY-ADMISSION MEDICATION HISTORY
Pharmacist Admission Medication History    Admission medication history is complete. The information provided in this note is only as accurate as the sources available at the time of the update.    Information Source(s): Patient and CareEverywhere/SureScripts via in-person    Pertinent Information: None    Changes made to PTA medication list:  Added: Omeprazole   Deleted: Hydroxyzine, Loperamide  Changed: None    Allergies reviewed with patient and updates made in EHR: no    Medication History Completed By: Horacio Barrera RPH 4/23/2025 2:06 PM    PTA Med List   Medication Sig Last Dose/Taking    apixaban ANTICOAGULANT (ELIQUIS) 2.5 MG tablet Take 2 tablets (5 mg) by mouth 2 times daily. 4/23/2025 Morning    blood glucose (NO BRAND SPECIFIED) test strip Use to test blood sugar one times daily or as directed. To accompany: Blood Glucose Monitor Brands: per insurance. Unknown    blood glucose monitoring (NO BRAND SPECIFIED) meter device kit Use to test blood sugar one times daily or as directed. Preferred blood glucose meter OR supplies to accompany: Blood Glucose Monitor Brands: per insurance. Unknown    butalbital-aspirin-caffeine (FIORINAL) -40 MG capsule Take 1 capsule by mouth every 8 hours as needed for headaches. Taking As Needed    cholecalciferol (VITAMIN D3) 10 mcg (400 units) TABS tablet Take 10 mcg by mouth daily. 4/22/2025 Morning    EPINEPHrine (ANY BX GENERIC EQUIV) 0.3 MG/0.3ML injection 2-pack Inject 0.3 mLs (0.3 mg) into the muscle as needed for anaphylaxis May repeat one time in 5-15 minutes if response to initial dose is inadequate. Taking As Needed    furosemide (LASIX) 40 MG tablet Take 1 tablet by mouth once daily 4/22/2025 Morning    glipiZIDE (GLUCOTROL XL) 2.5 MG 24 hr tablet Take 1 tablet by mouth twice daily 4/22/2025 Evening    metoprolol succinate ER (TOPROL XL) 25 MG 24 hr tablet Take 1 tablet (25 mg) by mouth daily. 4/22/2025 Morning    omeprazole (PRILOSEC) 20 MG DR capsule  Take 20 mg by mouth daily. 4/22/2025 Morning    thin (NO BRAND SPECIFIED) lancets Use with lanceting device. To accompany: Blood Glucose Monitor Brands: per insurance. Taking

## 2025-04-23 NOTE — CONSULTS
"[facility]    Stroke Telephone Note    I was called by Kasia Bella on 04/23/25 regarding patient Savanna Rehman. 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 Per ED, she awoke this morning with \"worst headache of her life\". No report of focal neurologic deficits. Presenting /70. She took her dose of Eliquis this morning.     Vitals  BP: 130/62   Pulse: 62   Resp: 11   Temp: 97.7  F (36.5  C)        Stroke Code Data (for stroke code without tele)  Stroke code activated 04/23/25  0955   Stroke provider first response 04/23/25  0957   Last known normal 04/22/25  2100      Time of discovery (or onset of symptoms) 04/23/25  0700   Head CT read by Stroke Neuro Provider 04/23/25  1004   Was stroke code de-escalated? Yes  04/23/25  1021     Imaging Findings  CT head: negative for acute pathology  CTA head/neck: Stable moderate L P2 stenosis.     Intravenous Thrombolysis  Not given due to:   - minor/isolated/quickly resolving symptoms  - DOAC dose within 48 hours or INR > 1.7    Endovascular Treatment  Not initiated due to absence of proximal vessel occlusion    Impression  Headache without report of focal neurologic deficits. CTH negative for ICH.     Recommendations   - Symptomatic management of headache. If headache persists or concern for neurologic decline/new focal deficits, recommend LP to further rule out SAH not seen on CT.    Case discussed with vascular neurology attending Dr. Dean.    My recommendations are based on the information provided over the phone by Savanna Rehman's in-person providers. They are not intended to replace the clinical judgment of her in-person providers. I was not requested to personally see or examine the patient at this time.     Rosina Manrique, CNP  Vascular Neurology    To page me or " "covering stroke neurology team member, click here: [employer]  Choose \"On Call\" tab at top, then select \"NEUROLOGY/ALL SITES\" from middle drop-down box, press Enter, then look for \"stroke\" or \"telestroke\" for your site.    "

## 2025-04-23 NOTE — ED NOTES
[facility]  ED Nurse Handoff Report    ED Chief complaint: Headache  . ED Diagnosis:   Final diagnoses:   Acute intractable headache, unspecified headache type       Allergies:   Allergies   Allergen Reactions    Augmented Betamethasone Diprop [Betamethasone] Other (See Comments)     Severe yeast infection    Petrolatum Anaphylaxis and Swelling     Rash and swelling    Shellfish-Derived Products Anaphylaxis     Tongue swelling    Bacitracin      Rash swelling    Bactrim [Sulfamethoxazole-Trimethoprim] Dizziness    Darvon [Propoxyphene] Swelling     Throat closes    Dilaudid [Hydromorphone]      No side effects from fentanyl June 2023    Levaquin [Levofloxacin] Swelling     Tongue swelling    Lidocaine      Facial swelling     Morphine Unknown     Per [facility] 2/23/24    Neomycin Swelling     rash    Neosporin [Neomycin-Polymyxin-Gramicidin] Swelling     rash    Nitrofurantoin      SOB, GI upset,    Oxycodone      Severe itching    Percocet [Oxycodone-Acetaminophen] Unknown    Percodan [Oxycodone-Aspirin]      Severe itching    Polymyxin B     Pramoxine     Tramadol     Vicodin [Hydrocodone-Acetaminophen]      Severe itching      Xarelto [Rivaroxaban]     Adhesive Tape Rash     Band aids     Codeine Rash    Hydrocortisone Rash and Swelling    Other Environmental Allergy Rash     Adhesive tape   Band aids        Code Status: DNR / DNI    Activity level - Baseline/Home:  assist of 2.  Activity Level - Current:   assist of 2.   Lift room needed: No.   Bariatric: No   Needed: No   Isolation: No.   Infection: Not Applicable.     Respiratory status: Room air    Vital Signs (within 30 minutes):   Vitals:    04/23/25 1030 04/23/25 1045 04/23/25 1100 04/23/25 1115   BP: (!) 140/67 (!) 142/78 (!) 142/71 130/62   Pulse: 60 61 60 62   Resp: 12 12 14 11   Temp:       TempSrc:       SpO2: 93% 95% 96% (!) 90%       Cardiac Rhythm:  ,      Pain level:    Patient confused: No.   Patient  "Falls Risk: arm band in place.   Elimination Status: Has not voided    Patient Report - Initial Complaint: Pt was diagnosed with a stroke in her right eye on Monday. Pt states that the work up for this was quite extensive. Woke up this morning with a \"massive headache.\" Pt states that it took her 35 minutes to get out of bed because she felt so unwell. Pt takes eliquis since January. Pt denies any new neurologic symptoms in the last 24 hours. .   Focused Assessment: Patient reports she woke today with a \"horrible\" headache, that she has been unable to manage at home.  Patient had a recent ocular stroke on Monday.  Patient reports no vision changes.      Abnormal Results:   Labs Ordered and Resulted from Time of ED Arrival to Time of ED Departure   BASIC METABOLIC PANEL - Abnormal       Result Value    Sodium 136      Potassium 3.8      Chloride 95 (*)     Carbon Dioxide (CO2) 29      Anion Gap 12      Urea Nitrogen 18.1      Creatinine 1.40 (*)     GFR Estimate 37 (*)     Calcium 9.3      Glucose 153 (*)    INR - Abnormal    INR 1.44 (*)    TROPONIN T, HIGH SENSITIVITY - Abnormal    Troponin T, High Sensitivity 22 (*)    CBC WITH PLATELETS AND DIFFERENTIAL - Abnormal    WBC Count 6.2      RBC Count 4.28      Hemoglobin 13.5      Hematocrit 38.6      MCV 90      MCH 31.5      MCHC 35.0      RDW 13.5      Platelet Count 127 (*)     % Neutrophils 55      % Lymphocytes 28      % Monocytes 13      % Eosinophils 2      % Basophils 1      % Immature Granulocytes 0      NRBCs per 100 WBC 0      Absolute Neutrophils 3.4      Absolute Lymphocytes 1.8      Absolute Monocytes 0.8      Absolute Eosinophils 0.1      Absolute Basophils 0.1      Absolute Immature Granulocytes 0.0      Absolute NRBCs 0.0     GLUCOSE BY METER - Abnormal    GLUCOSE BY METER POCT 160 (*)    TROPONIN T, HIGH SENSITIVITY - Abnormal    Troponin T, High Sensitivity 20 (*)    GLUCOSE BY METER - Abnormal    GLUCOSE BY METER POCT 136 (*)    PARTIAL " THROMBOPLASTIN TIME - Normal    aPTT 34     GLUCOSE MONITOR NURSING POCT   ROUTINE UA WITH MICROSCOPIC REFLEX TO CULTURE   GLUCOSE MONITOR NURSING POCT        CTA Head Neck with Contrast   Final Result   IMPRESSION:    HEAD CT:   1.  No CT evidence for acute intracranial process.   2.  Brain atrophy and presumed chronic microvascular ischemic changes as above.      HEAD CTA:    1.  No vascular cutoff of the proximal major intracranial arteries.   2.  Moderate focal stenosis of the mid left P2 segment, also present on 4/10/2025.      NECK CTA:   1.  Patent arteries in the neck without evidence of dissection.   2.  Atherosclerotic disease along the carotid arteries without significant stenosis by NASCET criteria.      Results discussed with Dr. Jackson at 10:09 AM on 4/23/2025.      CT Head w/o Contrast   Final Result   IMPRESSION:    HEAD CT:   1.  No CT evidence for acute intracranial process.   2.  Brain atrophy and presumed chronic microvascular ischemic changes as above.      HEAD CTA:    1.  No vascular cutoff of the proximal major intracranial arteries.   2.  Moderate focal stenosis of the mid left P2 segment, also present on 4/10/2025.      NECK CTA:   1.  Patent arteries in the neck without evidence of dissection.   2.  Atherosclerotic disease along the carotid arteries without significant stenosis by NASCET criteria.      Results discussed with Dr. Jackson at 10:09 AM on 4/23/2025.          Treatments provided: Medications  Family Comments: Friend Present  OBS brochure/video discussed/provided to patient:  Yes  ED Medications:   Medications   acetaminophen (TYLENOL) tablet 1,000 mg (has no administration in time range)   iopamidol (ISOVUE-370) solution 67 mL (67 mLs Intravenous $Given 4/23/25 1004)     And   sodium chloride 0.9 % bag for CT scan flush (100 mLs As instructed $Given 4/23/25 1005)   fentaNYL (PF) (SUBLIMAZE) injection 50 mcg (50 mcg Intravenous $Given 4/23/25 1019)   ondansetron (ZOFRAN)  injection 4 mg (4 mg Intravenous $Given 4/23/25 1018)   metoclopramide (REGLAN) injection 5 mg (5 mg Intravenous $Given 4/23/25 1116)   sodium chloride 0.9% BOLUS 500 mL (0 mLs Intravenous Stopped 4/23/25 1245)   tetracaine (PONTOCAINE) 0.5 % ophthalmic solution 2 drop (2 drops Both Eyes $Given 4/23/25 1119)   diphenhydrAMINE (BENADRYL) liquid 12.5 mg (12.5 mg Oral $Given 4/23/25 1118)   fentaNYL (PF) (SUBLIMAZE) injection 50 mcg (50 mcg Intravenous $Given 4/23/25 1246)       Drips infusing:  No  For the majority of the shift this patient was Green.   Interventions performed were None.    Sepsis treatment initiated: No    Cares/treatment/interventions/medications to be completed following ED care: None    ED Nurse Name: Chrissy Mayer RN  1:36 PM     RECEIVING UNIT ED HANDOFF REVIEW    Above ED Nurse Handoff Report was reviewed: Yes  Reviewed by: Angeles Pham RN on April 23, 2025 at 2:39 PM

## 2025-04-23 NOTE — PLAN OF CARE
ROOM # 208-2    Living Situation (if not independent, order SW consult):  Facility name: independent senior living: [facility]  : [place]    Activity level at baseline: independent with walker  Activity level on admit: SBA with walker    Who will be transporting you at discharge:     Patient registered to observation; given Patient Bill of Rights; given the opportunity to ask questions about observation status and their plan of care.  Patient has been oriented to the observation room, bathroom and call light is in place.    Discussed discharge goals and expectations with patient/family.         Goal Outcome Evaluation:

## 2025-04-23 NOTE — ED TRIAGE NOTES
"Pt was diagnosed with a stroke in her right eye on Monday. Pt states that the work up for this was quite extensive. Woke up this morning with a \"massive headache.\" Pt states that it took her 35 minutes to get out of bed because she felt so unwell. Pt takes eliquis since January. Pt denies any new neurologic symptoms in the last 24 hours.         "

## 2025-04-23 NOTE — ED PROVIDER NOTES
"  Emergency Department Note      History of Present Illness     Chief Complaint   Headache      HPI   Savanna Rehman is a 84 year old female who presents with a sudden onset headache that woke her up this morning at approximately 0700. Patient reports \"the worst headache of my life\". She also reports pain on her neck that exacerbates when moving her head. Reports going to bed at around [id number] last night feeling fine. 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 She has been worked up for this for some time. She reports taking her Eliquis this morning. She took two Tylenol for her headache this morning with minimal relief. Patient denies any recent chiropractor visits or unilateral weakness or numbness.     Independent Historian   None    Review of External Notes   none    Past Medical History     Medical History and Problem List   Acute encephalopathy  Anemia  Atrial fibrillation  C.difficle colitis, recurrent  Coronary artery disease  Chronic diarrhea  Congestive heart failure  Degenerative disk disease  Type 2 diabetes mellitus  Colonic diverticular abscess  Diverticulitis  Fibromyalgia  GERD  Gout  Hiatal hernia  CRISTIANA  Mumps  Subtherapeutic INR  Traumatic hematoma of buttock  Urinary incontinence    Medications   Eliquis  Esgic  Fiorinal  Lasix  Glucotrol  Atarax  Toprol    Surgical History   Appendectomy  Cholecystectomy  Colonoscopy x 3  Coronary angiogram  Cystoscopy, biopsy bladder combined  EGD  Left heart catheterization  Total abdominal hysterectomy  Left total knee arthroplasty  Nissen fundoplication  Tonsillectomy  Transposition ulnar nerve  EP pacemaker implant  Physical Exam     Patient Vitals for the past 24 hrs:   BP Temp Temp src Pulse Resp SpO2 Weight   04/23/25 1518 (!) 166/73 98.2  F (36.8  C) Oral 66 18 96 % 104.7 kg (230 lb 13.2 oz)   04/23/25 1350 135/63 98.3  F (36.8  C) Oral 65 16 97 % --   04/23/25 1115 130/62 -- -- 62 11 (!) 90 % -- "   04/23/25 1100 (!) 142/71 -- -- 60 14 96 % --   04/23/25 1045 (!) 142/78 -- -- 61 12 95 % --   04/23/25 1030 (!) 140/67 -- -- 60 12 93 % --   04/23/25 1016 -- -- -- -- 23 97 % --   04/23/25 1015 (!) 173/96 -- -- 109 -- 95 % --   04/23/25 0949 139/70 97.7  F (36.5  C) Temporal 67 16 98 % --     Physical Exam  General: Awake, alert, in moderate acute distress   HEENT: Atraumatic   EOM normal   External ears normal   Trachea midline  IOP 15 bilaterally  Neck: Supple, normal ROM  CV: Regular rate, regular rhythm   No lower extremity edema  2+ radial and DP pulses  PULM: Breath sounds normal bilaterally  No wheezes or rales  ABD: Soft, non-tender, non-distended  Normal bowel sounds   No rebound or guarding   MSK: No gross deformities  NEURO: Alert, no focal deficits. NIH 0  5/5 strength in bilateral upper and lower extremities.  No gross sensory deficits.  Patient moves in a coordinated fashion.  Cranial nerves 2-12 intact.  Cerebellar testing intact  Skin: Warm, dry and intact    Diagnostics     Lab Results   Labs Ordered and Resulted from Time of ED Arrival to Time of ED Departure   BASIC METABOLIC PANEL - Abnormal       Result Value    Sodium 136      Potassium 3.8      Chloride 95 (*)     Carbon Dioxide (CO2) 29      Anion Gap 12      Urea Nitrogen 18.1      Creatinine 1.40 (*)     GFR Estimate 37 (*)     Calcium 9.3      Glucose 153 (*)    INR - Abnormal    INR 1.44 (*)    TROPONIN T, HIGH SENSITIVITY - Abnormal    Troponin T, High Sensitivity 22 (*)    CBC WITH PLATELETS AND DIFFERENTIAL - Abnormal    WBC Count 6.2      RBC Count 4.28      Hemoglobin 13.5      Hematocrit 38.6      MCV 90      MCH 31.5      MCHC 35.0      RDW 13.5      Platelet Count 127 (*)     % Neutrophils 55      % Lymphocytes 28      % Monocytes 13      % Eosinophils 2      % Basophils 1      % Immature Granulocytes 0      NRBCs per 100 WBC 0      Absolute Neutrophils 3.4      Absolute Lymphocytes 1.8      Absolute Monocytes 0.8      Absolute  Eosinophils 0.1      Absolute Basophils 0.1      Absolute Immature Granulocytes 0.0      Absolute NRBCs 0.0     GLUCOSE BY METER - Abnormal    GLUCOSE BY METER POCT 160 (*)    TROPONIN T, HIGH SENSITIVITY - Abnormal    Troponin T, High Sensitivity 20 (*)    ROUTINE UA WITH MICROSCOPIC REFLEX TO CULTURE - Abnormal    Color Urine Yellow      Appearance Urine Slightly Cloudy (*)     Glucose Urine Negative      Bilirubin Urine Negative      Ketones Urine Negative      Specific Gravity Urine 1.015      Blood Urine Trace (*)     pH Urine 6.5      Protein Albumin Urine Negative      Urobilinogen Urine 6.0 (*)     Nitrite Urine Negative      Leukocyte Esterase Urine Large (*)     Bacteria Urine Few (*)     WBC Clumps Urine Present (*)     Mucus Urine Present (*)     RBC Urine 4 (*)     WBC Urine >182 (*)     Squamous Epithelials Urine 2 (*)    GLUCOSE BY METER - Abnormal    GLUCOSE BY METER POCT 136 (*)    PARTIAL THROMBOPLASTIN TIME - Normal    aPTT 34     GLUCOSE MONITOR NURSING POCT   GLUCOSE MONITOR NURSING POCT   URINE CULTURE     Imaging   CTA Head Neck with Contrast   Final Result   IMPRESSION:    HEAD CT:   1.  No CT evidence for acute intracranial process.   2.  Brain atrophy and presumed chronic microvascular ischemic changes as above.      HEAD CTA:    1.  No vascular cutoff of the proximal major intracranial arteries.   2.  Moderate focal stenosis of the mid left P2 segment, also present on 4/10/2025.      NECK CTA:   1.  Patent arteries in the neck without evidence of dissection.   2.  Atherosclerotic disease along the carotid arteries without significant stenosis by NASCET criteria.      Results discussed with Dr. Jackson at 10:09 AM on 4/23/2025.      CT Head w/o Contrast   Final Result   IMPRESSION:    HEAD CT:   1.  No CT evidence for acute intracranial process.   2.  Brain atrophy and presumed chronic microvascular ischemic changes as above.      HEAD CTA:    1.  No vascular cutoff of the proximal major  intracranial arteries.   2.  Moderate focal stenosis of the mid left P2 segment, also present on 4/10/2025.      NECK CTA:   1.  Patent arteries in the neck without evidence of dissection.   2.  Atherosclerotic disease along the carotid arteries without significant stenosis by NASCET criteria.      Results discussed with Dr. Jackson at 10:09 AM on 4/23/2025.        EKG     ECG results from 04/23/25   EKG 12-lead, tracing only     Value    Systolic Blood Pressure     Diastolic Blood Pressure     Ventricular Rate 63    Atrial Rate 64    CO Interval     QRS Duration 114        QTc 462    P Axis     R AXIS -23    T Axis -34    Interpretation ECG      Ventricular-paced rhythm  Abnormal ECG  When compared with ECG of 10-Apr-2025 12:36,  Vent. rate has decreased by   4 bpm         *Note: Due to a large number of results and/or encounters for the requested time period, some results have not been displayed. A complete set of results can be found in Results Review.       Independent Interpretation   CT Head: No intracranial hemorrhage.    ED Course      Medications Administered   Medications   meperidine (DEMEROL) injection 25 mg (has no administration in time range)   apixaban ANTICOAGULANT (ELIQUIS) tablet 5 mg (has no administration in time range)   butalbital-aspirin-caffeine (FIORINAL) capsule 1 capsule (has no administration in time range)   furosemide (LASIX) tablet 40 mg (has no administration in time range)   glipiZIDE (GLUCOTROL XL) 24 hr tablet 2.5 mg (has no administration in time range)   metoprolol succinate ER (TOPROL XL) 24 hr tablet 25 mg (has no administration in time range)   pantoprazole (PROTONIX) EC tablet 40 mg (40 mg Oral Not Given 4/23/25 1545)   senna-docusate (SENOKOT-S/PERICOLACE) 8.6-50 MG per tablet 1 tablet (has no administration in time range)     Or   senna-docusate (SENOKOT-S/PERICOLACE) 8.6-50 MG per tablet 2 tablet (has no administration in time range)   ondansetron (ZOFRAN ODT) ODT  tab 4 mg (has no administration in time range)     Or   ondansetron (ZOFRAN) injection 4 mg (has no administration in time range)   prochlorperazine (COMPAZINE) injection 5 mg (has no administration in time range)     Or   prochlorperazine (COMPAZINE) tablet 5 mg (has no administration in time range)   Patient is already receiving anticoagulation with heparin, enoxaparin (LOVENOX), warfarin (COUMADIN)  or other anticoagulant medication (has no administration in time range)   cefTRIAXone (ROCEPHIN) 1 g vial to attach to  mL bag for ADULTS or NS 50 mL bag for PEDS ( Intravenous Automatically Held 4/26/25 1500)   diazepam (VALIUM) tablet 5 mg (5 mg Oral $Given 4/23/25 1611)   methocarbamol (ROBAXIN) half-tab 250 mg (has no administration in time range)   naloxone (NARCAN) injection 0.2 mg (has no administration in time range)     Or   naloxone (NARCAN) injection 0.4 mg (has no administration in time range)     Or   naloxone (NARCAN) injection 0.2 mg (has no administration in time range)     Or   naloxone (NARCAN) injection 0.4 mg (has no administration in time range)   iopamidol (ISOVUE-370) solution 67 mL (67 mLs Intravenous $Given 4/23/25 1004)     And   sodium chloride 0.9 % bag for CT scan flush (100 mLs As instructed $Given 4/23/25 1005)   fentaNYL (PF) (SUBLIMAZE) injection 50 mcg (50 mcg Intravenous $Given 4/23/25 1019)   ondansetron (ZOFRAN) injection 4 mg (4 mg Intravenous $Given 4/23/25 1018)   metoclopramide (REGLAN) injection 5 mg (5 mg Intravenous $Given 4/23/25 1116)   sodium chloride 0.9% BOLUS 500 mL (0 mLs Intravenous Stopped 4/23/25 1245)   tetracaine (PONTOCAINE) 0.5 % ophthalmic solution 2 drop (2 drops Both Eyes $Given 4/23/25 1119)   diphenhydrAMINE (BENADRYL) liquid 12.5 mg (12.5 mg Oral $Given 4/23/25 1118)   fentaNYL (PF) (SUBLIMAZE) injection 50 mcg (50 mcg Intravenous $Given 4/23/25 0697)   acetaminophen (TYLENOL) tablet 1,000 mg (1,000 mg Oral Not Given 4/23/25 2828)       Procedures    Procedures     Discussion of Management   Admitting Hospitalist, Dr. Doshi    ED Course   ED Course as of 04/23/25 1708 Wed Apr 23, 2025   0951 I obtained history and examined the patient as noted above.     0952 I called a tier 1 code stroke.   0957 I spoke with Stroke neurology, regarding the patient.      1011 I spoke with Keshawn Fish MD, [employer] radiology, regarding the patient.    1025 I spoke with Rosina Lawton NP, stroke neurology. We are deescalating.    1234 I rechecked and updated the patient. She says her headache is still a 12/10. We discussed plan for admission and patient is in agreement with plan.      1249 IOP 15 bilaterally   1318 I spoke with Dr. Doshi of the hospitalist team, regarding the patient.        Additional Documentation  None    Medical Decision Making / Diagnosis     CMS Diagnoses: None    MIPS       None    MDM   Savanna Rehman is a 84 year old female who presents with sudden onset headache this morning.  Alert as a tier 1 stroke.  No neurologic deficit identified.  CT head obtained within 6 hours is negative.  Very low suspicion for small radiographically negative subarachnoid hemorrhage though not completely excluded.  Stroke neurology agree and recommend lumbar puncture.  Not comfortable performing lumbar puncture due to having just taken Eliquis this morning.  Will admit to hospital medicine for intractable headache.  In part her headache is intractable due to her multiple medication allergies.  She did take a gram of Tylenol prior to arrival.  If persisting headache tomorrow, could consider LP with IR given that Eliquis will be held during her hospitalization.  Very low suspicion for meningitis or encephalitis given no fevers.  Intraocular pressures are normal making glaucoma unlikely.      Ultimately she requested a urinalysis to be drawn.  Very minimal symptoms though urine grossly positive.  Will treat with Rocephin.  Admitted to the hospitalist under observation.   All questions answered.    Disposition   The patient was admitted to the hospital.     Diagnosis     ICD-10-CM    1. Acute intractable headache, unspecified headache type  R51.9                  Scribe Disclosure:  I, Lorna Majano, am serving as a scribe at 10:00 AM on 4/23/2025 to document services personally performed by Kasia Kumari DO based on my observations and the provider's statements to me.        Kasia Kumari DO  04/23/25 1705

## 2025-04-24 ENCOUNTER — APPOINTMENT (OUTPATIENT)
Dept: CT IMAGING | Facility: CLINIC | Age: 85
DRG: 556 | End: 2025-04-24
Attending: HOSPITALIST
Payer: COMMERCIAL

## 2025-04-24 VITALS
SYSTOLIC BLOOD PRESSURE: 148 MMHG | BODY MASS INDEX: 32.19 KG/M2 | DIASTOLIC BLOOD PRESSURE: 71 MMHG | HEART RATE: 62 BPM | RESPIRATION RATE: 17 BRPM | TEMPERATURE: 98 F | WEIGHT: 230.82 LBS | OXYGEN SATURATION: 97 %

## 2025-04-24 LAB
ANION GAP SERPL CALCULATED.3IONS-SCNC: 8 MMOL/L (ref 7–15)
BACTERIA UR CULT: ABNORMAL
BASOPHILS # BLD AUTO: 0.1 10E3/UL (ref 0–0.2)
BASOPHILS NFR BLD AUTO: 1 %
BUN SERPL-MCNC: 15 MG/DL (ref 8–23)
CALCIUM SERPL-MCNC: 8.7 MG/DL (ref 8.8–10.4)
CHLORIDE SERPL-SCNC: 100 MMOL/L (ref 98–107)
CREAT SERPL-MCNC: 0.99 MG/DL (ref 0.51–0.95)
EGFRCR SERPLBLD CKD-EPI 2021: 57 ML/MIN/1.73M2
EOSINOPHIL # BLD AUTO: 0.2 10E3/UL (ref 0–0.7)
EOSINOPHIL NFR BLD AUTO: 3 %
ERYTHROCYTE [DISTWIDTH] IN BLOOD BY AUTOMATED COUNT: 13.6 % (ref 10–15)
GLUCOSE BLDC GLUCOMTR-MCNC: 138 MG/DL (ref 70–99)
GLUCOSE BLDC GLUCOMTR-MCNC: 178 MG/DL (ref 70–99)
GLUCOSE SERPL-MCNC: 139 MG/DL (ref 70–99)
HCO3 SERPL-SCNC: 28 MMOL/L (ref 22–29)
HCT VFR BLD AUTO: 34.9 % (ref 35–47)
HGB BLD-MCNC: 11.9 G/DL (ref 11.7–15.7)
IMM GRANULOCYTES # BLD: 0 10E3/UL
IMM GRANULOCYTES NFR BLD: 0 %
LYMPHOCYTES # BLD AUTO: 1.5 10E3/UL (ref 0.8–5.3)
LYMPHOCYTES NFR BLD AUTO: 25 %
MCH RBC QN AUTO: 31.2 PG (ref 26.5–33)
MCHC RBC AUTO-ENTMCNC: 34.1 G/DL (ref 31.5–36.5)
MCV RBC AUTO: 91 FL (ref 78–100)
MONOCYTES # BLD AUTO: 0.9 10E3/UL (ref 0–1.3)
MONOCYTES NFR BLD AUTO: 14 %
NEUTROPHILS # BLD AUTO: 3.5 10E3/UL (ref 1.6–8.3)
NEUTROPHILS NFR BLD AUTO: 57 %
NRBC # BLD AUTO: 0 10E3/UL
NRBC BLD AUTO-RTO: 0 /100
PLATELET # BLD AUTO: 100 10E3/UL (ref 150–450)
POTASSIUM SERPL-SCNC: 4 MMOL/L (ref 3.4–5.3)
RBC # BLD AUTO: 3.82 10E6/UL (ref 3.8–5.2)
SODIUM SERPL-SCNC: 136 MMOL/L (ref 135–145)
WBC # BLD AUTO: 6.2 10E3/UL (ref 4–11)

## 2025-04-24 PROCEDURE — 85004 AUTOMATED DIFF WBC COUNT: CPT | Performed by: HOSPITALIST

## 2025-04-24 PROCEDURE — 99207 PR NO BILLABLE SERVICE THIS VISIT: CPT

## 2025-04-24 PROCEDURE — 80048 BASIC METABOLIC PNL TOTAL CA: CPT | Performed by: HOSPITALIST

## 2025-04-24 PROCEDURE — 82962 GLUCOSE BLOOD TEST: CPT

## 2025-04-24 PROCEDURE — G0378 HOSPITAL OBSERVATION PER HR: HCPCS

## 2025-04-24 PROCEDURE — 36415 COLL VENOUS BLD VENIPUNCTURE: CPT | Performed by: HOSPITALIST

## 2025-04-24 PROCEDURE — 250N000011 HC RX IP 250 OP 636: Performed by: HOSPITALIST

## 2025-04-24 PROCEDURE — 250N000013 HC RX MED GY IP 250 OP 250 PS 637: Performed by: INTERNAL MEDICINE

## 2025-04-24 PROCEDURE — 99232 SBSQ HOSP IP/OBS MODERATE 35: CPT | Performed by: HOSPITALIST

## 2025-04-24 PROCEDURE — 120N000001 HC R&B MED SURG/OB

## 2025-04-24 PROCEDURE — 72125 CT NECK SPINE W/O DYE: CPT

## 2025-04-24 PROCEDURE — 250N000013 HC RX MED GY IP 250 OP 250 PS 637: Performed by: HOSPITALIST

## 2025-04-24 RX ORDER — DIAZEPAM 5 MG/1
10 TABLET ORAL ONCE
Status: COMPLETED | OUTPATIENT
Start: 2025-04-24 | End: 2025-04-24

## 2025-04-24 RX ORDER — CYCLOBENZAPRINE HCL 10 MG
10 TABLET ORAL EVERY 8 HOURS PRN
Status: DISCONTINUED | OUTPATIENT
Start: 2025-04-24 | End: 2025-04-24

## 2025-04-24 RX ORDER — BACLOFEN 10 MG/1
10 TABLET ORAL ONCE
Status: COMPLETED | OUTPATIENT
Start: 2025-04-24 | End: 2025-04-24

## 2025-04-24 RX ADMIN — DIAZEPAM 10 MG: 5 TABLET ORAL at 08:41

## 2025-04-24 RX ADMIN — PANTOPRAZOLE SODIUM 40 MG: 40 TABLET, DELAYED RELEASE ORAL at 08:41

## 2025-04-24 RX ADMIN — DICLOFENAC SODIUM 2 G: 10 GEL TOPICAL at 16:06

## 2025-04-24 RX ADMIN — GLIPIZIDE 2.5 MG: 2.5 TABLET, EXTENDED RELEASE ORAL at 21:14

## 2025-04-24 RX ADMIN — GLIPIZIDE 2.5 MG: 2.5 TABLET, EXTENDED RELEASE ORAL at 08:41

## 2025-04-24 RX ADMIN — DICLOFENAC SODIUM 2 G: 10 GEL TOPICAL at 12:35

## 2025-04-24 RX ADMIN — DIAZEPAM 5 MG: 5 TABLET ORAL at 21:21

## 2025-04-24 RX ADMIN — DIAZEPAM 5 MG: 5 TABLET ORAL at 01:18

## 2025-04-24 RX ADMIN — APIXABAN 5 MG: 5 TABLET, FILM COATED ORAL at 21:14

## 2025-04-24 RX ADMIN — METOPROLOL SUCCINATE 25 MG: 25 TABLET, EXTENDED RELEASE ORAL at 08:41

## 2025-04-24 RX ADMIN — CYCLOBENZAPRINE HYDROCHLORIDE 7.5 MG: 5 TABLET, FILM COATED ORAL at 21:21

## 2025-04-24 RX ADMIN — APIXABAN 5 MG: 5 TABLET, FILM COATED ORAL at 08:41

## 2025-04-24 RX ADMIN — BACLOFEN 10 MG: 10 TABLET ORAL at 12:34

## 2025-04-24 RX ADMIN — DICLOFENAC SODIUM 2 G: 10 GEL TOPICAL at 21:14

## 2025-04-24 RX ADMIN — CYCLOBENZAPRINE 10 MG: 10 TABLET, FILM COATED ORAL at 01:18

## 2025-04-24 RX ADMIN — CEFTRIAXONE 1 G: 1 INJECTION, POWDER, FOR SOLUTION INTRAMUSCULAR; INTRAVENOUS at 15:51

## 2025-04-24 ASSESSMENT — ACTIVITIES OF DAILY LIVING (ADL)
ADLS_ACUITY_SCORE: 40
ADLS_ACUITY_SCORE: 41
ADLS_ACUITY_SCORE: 40
ADLS_ACUITY_SCORE: 41
ADLS_ACUITY_SCORE: 40
ADLS_ACUITY_SCORE: 41
ADLS_ACUITY_SCORE: 40
ADLS_ACUITY_SCORE: 42
ADLS_ACUITY_SCORE: 40
ADLS_ACUITY_SCORE: 40
ADLS_ACUITY_SCORE: 42
ADLS_ACUITY_SCORE: 42
ADLS_ACUITY_SCORE: 40
ADLS_ACUITY_SCORE: 40
ADLS_ACUITY_SCORE: 41

## 2025-04-24 NOTE — PLAN OF CARE
"Goal Outcome Evaluation:      Plan of Care Reviewed With: patient    Overall Patient Progress: no changeOverall Patient Progress: no change    Outcome Evaluation: VSS, afebrile. C/o of neck pain. CT scheduled for 6 pm. PIV saline locked. IV antibiotics. SBA with ambulations.    Problem: Adult Inpatient Plan of Care  Goal: Plan of Care Review  Description: The Plan of Care Review/Shift note should be completed every shift.  The Outcome Evaluation is a brief statement about your assessment that the patient is improving, declining, or no change.  This information will be displayed automatically on your shiftnote.  Outcome: Progressing  Flowsheets (Taken 4/24/2025 7933)  Outcome Evaluation: VSS, afebrile. C/o of neck pain. CT scheduled for 6 pm. PIV saline locked. IV antibiotics. SBA with ambulations.  Plan of Care Reviewed With: patient  Overall Patient Progress: no change  Goal: Patient-Specific Goal (Individualized)  Description: You can add care plan individualizations to a care plan. Examples of Individualization might be:  \"Parent requests to be called daily at 9am for status\", \"I have a hard time hearing out of my right ear\", or \"Do not touch me to wake me up as it startlesme\".  Outcome: Progressing  Goal: Absence of Hospital-Acquired Illness or Injury  Outcome: Progressing  Intervention: Identify and Manage Fall Risk  Recent Flowsheet Documentation  Taken 4/24/2025 1652 by Marcello Sargent, RN  Safety Promotion/Fall Prevention: clutter free environment maintained  Intervention: Prevent Skin Injury  Recent Flowsheet Documentation  Taken 4/24/2025 1652 by Marcello Sargent, RN  Body Position: position changed independently  Intervention: Prevent Infection  Recent Flowsheet Documentation  Taken 4/24/2025 1652 by Marcello Sargent, RN  Infection Prevention: rest/sleep promoted  Goal: Optimal Comfort and Wellbeing  Outcome: Progressing  Intervention: Provide Person-Centered Care  Recent Flowsheet Documentation  Taken 4/24/2025 " 1652 by Marcello Sargent RN  Trust Relationship/Rapport: care explained  Goal: Readiness for Transition of Care  Outcome: Progressing     Problem: Delirium  Goal: Optimal Coping  Outcome: Progressing  Goal: Improved Behavioral Control  Outcome: Progressing  Intervention: Minimize Safety Risk  Recent Flowsheet Documentation  Taken 4/24/2025 1652 by Marcello Sargent RN  Enhanced Safety Measures: pain management  Trust Relationship/Rapport: care explained  Goal: Improved Attention and Thought Clarity  Outcome: Progressing  Goal: Improved Sleep  Outcome: Progressing     Problem: Pain Acute  Goal: Optimal Pain Control and Function  Outcome: Progressing  Intervention: Prevent or Manage Pain  Recent Flowsheet Documentation  Taken 4/24/2025 1652 by Marcello Sargent, RN  Medication Review/Management: medications reviewed

## 2025-04-24 NOTE — CONSULTS
Care Management Discharge Note    Discharge Date: 04/25/2025     Discharge Disposition:  Home     Additional Information:  Pt admitted with neck pain and headache, noted to have unplanned readmission risk of 27%. Per chart review, pt resides at [facility] apartment and is independent with WW at baseline. Per discussion with provider, pt does not have anticipated discharge needs at this time. Will sign off, please call if needs arise.     Latia Gao RN BSN   Inpatient Care Coordination  [facility]   Phone [phone]

## 2025-04-24 NOTE — PROGRESS NOTES
"[facility]    Medicine Progress Note - Hospitalist Service    Date of Admission:  4/23/2025    Assessment & Plan   Savanna Rehman is a 84 year old female with ITP, DM, FERREIRA cirrhosis, CRISTIANA, a fib, CAD, HFpEF, CKD, bradycardia, pacemaker, diverticulitis in the past admitted on 4/23/2025 with neck pain/spasm causing headache.     Neck pain  Headache    - patient describes neck pain going to her head    - states she could not turn her head this am    - ED was concerned with SAH and had imaging done (neg) and called stroke Neuro    - treat spasm: valium, robaxin, heat, neck brace, flexeril replaced robaxin overnight    - patient seen this am and stated nothing helped: tried valium 10mg, baclofen    - patient seen again and stated nothing \"helped an ounce\"    - then later states the combination of flexeril and lower dose valium may have helped: instructed nurse to try this    - CT cervical spine as she states her muscle spasm is now going down her back     Recent ischemic neuropathy of R eye    - stable    - had full work-up    - cont apixaban     UTI    - UA appears dirty    - endorses recent UTI treated with Keflex (was E Coli)    - endorses low abdo discomfort    - ceftriaxone ordered in the ED    - urine growing e coli, cont antibiotics     ITP    - stable    - follows Dr Aletha MORGAN  HFpEF    - stable, cont meds (lasix, toprol)     Atrial fibrillation  Pacemaker    - rate controlled    - cont Toprol    - cont eliquis     DM    - cont glipizide    - last Hb A1c was 6.6. on 1/31/2025    - no need to check sugars here     CRISTIANA    - does not use CPAP     Called and updated daughter        Diet: Moderate Consistent Carb (60 g CHO per Meal) Diet    DVT Prophylaxis: DOAC  Aguilar Catheter: Not present  Lines: None     Cardiac Monitoring: None  Code Status: No CPR- Do NOT Intubate      Clinically Significant Risk Factors Present on Admission          # Hypochloremia: Lowest Cl = 95 mmol/L in last 2 " "days, will monitor as appropriate       # Drug Induced Coagulation Defect: home medication list includes an anticoagulant medication  # Thrombocytopenia: Lowest platelets = 100 in last 2 days, will monitor for bleeding    # Chronic heart failure with preserved ejection fraction: heart failure noted on problem list and last echo with EF >50%         # DMII: A1C = 6.6 % (Ref range: 0.0 - 5.6 %) within past 6 months    # Obesity: Estimated body mass index is 32.19 kg/m  as calculated from the following:    Height as of 4/10/25: 1.803 m (5' 11\").    Weight as of this encounter: 104.7 kg (230 lb 13.2 oz).       # Financial/Environmental Concerns:     # Pacemaker present       Social Drivers of Health    Tobacco Use: Medium Risk (4/22/2025)    Patient History     Smoking Tobacco Use: Former     Smokeless Tobacco Use: Never     Passive Exposure: Past   Stress: Stress Concern Present (1/27/2022)    [employer] - Occupational Stress Questionnaire     Feeling of Stress : Very much    [facility]    Social Connections          Disposition Plan     Medically Ready for Discharge: Anticipated Today    Hardik Doshi MD  Hospitalist Service  [facility]  Securely message with "[employer] (more info)  Text page via [employer] Paging/Directory   ______________________________________________________________________    Interval History     Patient was seen multiple times today.  In the morning she stated that she was no better.  However, then she stated the Flexeril and Valium together worked well and that she could no longer \"hear her heart pounding in my ears\".  No other new complaints.  I saw her again in the afternoon after trying different medications.  She states nothing has \"even worked an ounce\".  Again after a very long conversation she does admit maybe the Flexeril and Valium together " worked the best.  She states that her muscle spasm is now going down her back.    Physical Exam   Vital Signs: Temp: 98  F (36.7  C) Temp src: Oral BP: (!) 144/75 Pulse: 69   Resp: 16 SpO2: (!) 91 % O2 Device: None (Room air)    Weight: 230 lbs 13.15 oz    Constitutional: awake, alert, cooperative, no apparent distress, and appears stated age  Eyes: Lids and lashes normal, pupils equal, round and reactive to light, extra ocular muscles intact, sclera clear, conjunctiva normal  ENT: still has significant bilateral cervical paraspinal muscle spasm. It does appear like she is able to move her head a little better now.    Medical Decision Making       40 MINUTES SPENT BY ME on the date of service doing chart review, history, exam, documentation & further activities per the note.      Data     I have personally reviewed the following data over the past 24 hrs:    6.2  \   11.9   / 100 (L)     136 100 15.0 /  139 (H)   4.0 28 0.99 (H) \       Imaging results reviewed over the past 24 hrs:   No results found for this or any previous visit (from the past 24 hours).

## 2025-04-24 NOTE — PLAN OF CARE
"AOX4, RA. Give Robaxin, valium and Flexeril for neck pain/spasm. Robaxin now discontinued. Up with 1 walker and GB.  BP (!) 145/65 (BP Location: Left arm)   Pulse 67   Temp 98.7  F (37.1  C) (Oral)   Resp 16   Wt 104.7 kg (230 lb 13.2 oz)   LMP  (LMP Unknown)   SpO2 93%   BMI 32.19 kg/m       Goal Outcome Evaluation:      Plan of Care Reviewed With: patient    Overall Patient Progress: improvingOverall Patient Progress: improving    Problem: Adult Inpatient Plan of Care  Goal: Plan of Care Review  Description: The Plan of Care Review/Shift note should be completed every shift.  The Outcome Evaluation is a brief statement about your assessment that the patient is improving, declining, or no change.  This information will be displayed automatically on your shiftnote.  Outcome: Progressing  Flowsheets (Taken 4/24/2025 0303)  Plan of Care Reviewed With: patient  Overall Patient Progress: improving  Goal: Patient-Specific Goal (Individualized)  Description: You can add care plan individualizations to a care plan. Examples of Individualization might be:  \"Parent requests to be called daily at 9am for status\", \"I have a hard time hearing out of my right ear\", or \"Do not touch me to wake me up as it startlesme\".  Outcome: Progressing  Goal: Absence of Hospital-Acquired Illness or Injury  Outcome: Progressing  Intervention: Identify and Manage Fall Risk  Recent Flowsheet Documentation  Taken 4/23/2025 2042 by Sridevi Farias RN  Safety Promotion/Fall Prevention:   activity supervised   safety round/check completed  Intervention: Prevent Skin Injury  Recent Flowsheet Documentation  Taken 4/23/2025 2042 by Sridevi Farias RN  Body Position: position changed independently  Goal: Optimal Comfort and Wellbeing  Outcome: Progressing  Intervention: Monitor Pain and Promote Comfort  Recent Flowsheet Documentation  Taken 4/23/2025 2041 by Sridevi Farias, RN  Pain Management Interventions:   medication (see " MAR)   heat applied  Goal: Readiness for Transition of Care  Outcome: Progressing     Problem: Delirium  Goal: Optimal Coping  Outcome: Progressing  Goal: Improved Behavioral Control  Outcome: Progressing  Intervention: Minimize Safety Risk  Recent Flowsheet Documentation  Taken 4/23/2025 2042 by Sridevi Farias RN  Enhanced Safety Measures: pain management  Goal: Improved Attention and Thought Clarity  Outcome: Progressing  Intervention: Maximize Cognitive Function  Recent Flowsheet Documentation  Taken 4/23/2025 2042 by Sridevi Farias RN  Sensory Stimulation Regulation: quiet environment promoted  Reorientation Measures:   calendar in view   clock in view  Goal: Improved Sleep  Outcome: Progressing     Problem: Pain Acute  Goal: Optimal Pain Control and Function  Outcome: Progressing  Intervention: Develop Pain Management Plan  Recent Flowsheet Documentation  Taken 4/23/2025 2041 by Sridevi Farias RN  Pain Management Interventions:   medication (see MAR)   heat applied  Intervention: Prevent or Manage Pain  Recent Flowsheet Documentation  Taken 4/23/2025 2042 by Sridevi Farias, RN  Sensory Stimulation Regulation: quiet environment promoted  Medication Review/Management: medications reviewed

## 2025-04-24 NOTE — PLAN OF CARE
PRIMARY DIAGNOSIS: ACUTE PAIN  OUTPATIENT/OBSERVATION GOALS TO BE MET BEFORE DISCHARGE:  1. Pain Status: No improvement noted. Consider adjustment in pain regimen.    2. Return to near baseline physical activity: Yes    3. Cleared for discharge by consultants (if involved): No    Discharge Planner Nurse   Safe discharge environment identified: Yes  Barriers to discharge: Yes       Entered by: Angeles Pham RN 04/24/2025 1:43 PM    Patient is Aox4, VSS, LCTA, BS active. SBA in her room with her personal walker. Today we have tried increasing valium as a one time dose to 10mg patient stated this did not help her pain at all. We tried baclofen and voltaren gel patient also stated that this did nothing to help her pain. Also have tried warm packs. Discharge pending on patient having some improvement with her pain. Also on IV abx for UTI    Please review provider order for any additional goals.   Nurse to notify provider when observation goals have been met and patient is ready for discharge.Goal Outcome Evaluation:      Plan of Care Reviewed With: patient    Overall Patient Progress: no changeOverall Patient Progress: no change

## 2025-04-24 NOTE — PLAN OF CARE
PRIMARY DIAGNOSIS: ACUTE PAIN  OUTPATIENT/OBSERVATION GOALS TO BE MET BEFORE DISCHARGE:  1. Pain Status: No improvement noted. Consider adjustment in pain regimen.    2. Return to near baseline physical activity: Yes    3. Cleared for discharge by consultants (if involved): No    Discharge Planner Nurse   Safe discharge environment identified: Yes  Barriers to discharge: Yes       Entered by: Angeles Pham RN 04/24/2025 1:38 PM    Patient is Aox4, VSS, LCTA, BS active. She is SBA in her room with her personal walker, tolerating a mod carb diet. Her neck pain has not improved. The combination of valium and robaxin overnight made her very groggy this morning. Provider is deciding on different options for pain control.       Please review provider order for any additional goals.   Nurse to notify provider when observation goals have been met and patient is ready for discharge.Goal Outcome Evaluation:      Plan of Care Reviewed With: patient    Overall Patient Progress: no changeOverall Patient Progress: no change

## 2025-04-24 NOTE — PROGRESS NOTES
"[facility]    Brief Stroke Telephone Note    See stroke note from yesterday for HPI.  Connected with Dr. I was called by Hardik Doshi on 04/24/25 regarding patient Savanna Rehman.  Discussed recommendation to obtain repeat CT head but then Dr. Doshi reported send her symptoms were due to neck spasm rather than true headache.    Vitals  BP: (!) 144/75   Pulse: 69   Resp: 16   Temp: 98  F (36.7  C)   Weight: 104.7 kg (230 lb 13.2 oz)    Recommendations  - Continue management of neck spasm per primary team  - Discontinue 24-hour repeat CT head  - No further stroke workup is recommended, we will sign off. Please contact us for additional questions or concerns.    Case discussed with vascular neurology attending Dr. Dean.    My recommendations are based on the information provided over the phone by Savanna [facility]'s in-person providers. They are not intended to replace the clinical judgment of her in-person providers. I was not requested to personally see or examine the patient at this time.     Guera Cook PA-C  Vascular Neurology    To page me or covering stroke neurology team member, click here: [facility]  Choose \"On Call\" tab at top, then select \"NEUROLOGY/ALL SITES\" from middle drop-down box, press Enter, then look for \"stroke\" or \"telestroke\" for your site.   "

## 2025-04-25 ENCOUNTER — APPOINTMENT (OUTPATIENT)
Dept: OCCUPATIONAL THERAPY | Facility: CLINIC | Age: 85
DRG: 556 | End: 2025-04-25
Attending: INTERNAL MEDICINE
Payer: COMMERCIAL

## 2025-04-25 LAB — GLUCOSE BLDC GLUCOMTR-MCNC: 155 MG/DL (ref 70–99)

## 2025-04-25 PROCEDURE — 97535 SELF CARE MNGMENT TRAINING: CPT | Mod: GO | Performed by: OCCUPATIONAL THERAPIST

## 2025-04-25 PROCEDURE — 99232 SBSQ HOSP IP/OBS MODERATE 35: CPT | Performed by: INTERNAL MEDICINE

## 2025-04-25 PROCEDURE — 120N000001 HC R&B MED SURG/OB

## 2025-04-25 PROCEDURE — 250N000013 HC RX MED GY IP 250 OP 250 PS 637: Performed by: HOSPITALIST

## 2025-04-25 PROCEDURE — 250N000013 HC RX MED GY IP 250 OP 250 PS 637: Performed by: INTERNAL MEDICINE

## 2025-04-25 PROCEDURE — 97165 OT EVAL LOW COMPLEX 30 MIN: CPT | Mod: GO | Performed by: OCCUPATIONAL THERAPIST

## 2025-04-25 RX ORDER — CEPHALEXIN 500 MG/1
500 CAPSULE ORAL EVERY 8 HOURS SCHEDULED
Status: DISCONTINUED | OUTPATIENT
Start: 2025-04-25 | End: 2025-04-25

## 2025-04-25 RX ORDER — CEFDINIR 300 MG/1
300 CAPSULE ORAL EVERY 12 HOURS SCHEDULED
Status: DISCONTINUED | OUTPATIENT
Start: 2025-04-25 | End: 2025-04-26 | Stop reason: HOSPADM

## 2025-04-25 RX ADMIN — PANTOPRAZOLE SODIUM 40 MG: 40 TABLET, DELAYED RELEASE ORAL at 08:43

## 2025-04-25 RX ADMIN — APIXABAN 5 MG: 5 TABLET, FILM COATED ORAL at 20:06

## 2025-04-25 RX ADMIN — DICLOFENAC SODIUM 2 G: 10 GEL TOPICAL at 12:15

## 2025-04-25 RX ADMIN — CYCLOBENZAPRINE HYDROCHLORIDE 7.5 MG: 5 TABLET, FILM COATED ORAL at 17:28

## 2025-04-25 RX ADMIN — DICLOFENAC SODIUM 2 G: 10 GEL TOPICAL at 17:29

## 2025-04-25 RX ADMIN — FUROSEMIDE 40 MG: 40 TABLET ORAL at 08:43

## 2025-04-25 RX ADMIN — GLIPIZIDE 2.5 MG: 2.5 TABLET, EXTENDED RELEASE ORAL at 08:43

## 2025-04-25 RX ADMIN — GLIPIZIDE 2.5 MG: 2.5 TABLET, EXTENDED RELEASE ORAL at 20:06

## 2025-04-25 RX ADMIN — CYCLOBENZAPRINE HYDROCHLORIDE 7.5 MG: 5 TABLET, FILM COATED ORAL at 08:43

## 2025-04-25 RX ADMIN — CEFDINIR 300 MG: 300 CAPSULE ORAL at 12:15

## 2025-04-25 RX ADMIN — CEFDINIR 300 MG: 300 CAPSULE ORAL at 20:06

## 2025-04-25 RX ADMIN — DICLOFENAC SODIUM 2 G: 10 GEL TOPICAL at 20:06

## 2025-04-25 RX ADMIN — DIAZEPAM 5 MG: 5 TABLET ORAL at 17:28

## 2025-04-25 RX ADMIN — METOPROLOL SUCCINATE 25 MG: 25 TABLET, EXTENDED RELEASE ORAL at 08:43

## 2025-04-25 RX ADMIN — APIXABAN 5 MG: 5 TABLET, FILM COATED ORAL at 08:43

## 2025-04-25 RX ADMIN — BUTALBITAL, ASPIRIN, AND CAFFEINE 1 CAPSULE: 325; 50; 40 CAPSULE ORAL at 04:26

## 2025-04-25 RX ADMIN — DIAZEPAM 5 MG: 5 TABLET ORAL at 08:43

## 2025-04-25 RX ADMIN — DICLOFENAC SODIUM 2 G: 10 GEL TOPICAL at 08:42

## 2025-04-25 ASSESSMENT — ACTIVITIES OF DAILY LIVING (ADL)
ADLS_ACUITY_SCORE: 41

## 2025-04-25 NOTE — PLAN OF CARE
"Pt is alert and oriented. PRN Fiorinal administered to manage headache. Patient denies any shortness of breath and on room air.    Pt ambulates 1 assist with a walker to the bathroom. Ongoing monitoring.    Blood glucose: 155. Neuro assessment completed.    Ongoing monitoring.    Plan: Continue POC. Discharge TBD.    BP (!) 153/74 (BP Location: Left arm)   Pulse 64   Temp 98.2  F (36.8  C) (Oral)   Resp 17   Wt 104.7 kg (230 lb 13.2 oz)   LMP  (LMP Unknown)   SpO2 98%   BMI 32.19 kg/m       Problem: Adult Inpatient Plan of Care  Goal: Plan of Care Review  Description: The Plan of Care Review/Shift note should be completed every shift.  The Outcome Evaluation is a brief statement about your assessment that the patient is improving, declining, or no change.  This information will be displayed automatically on your shiftnote.  Outcome: Progressing  Flowsheets (Taken 4/25/2025 0345)  Outcome Evaluation: Pt states flexeril and valium helps.  Plan of Care Reviewed With: patient  Goal: Patient-Specific Goal (Individualized)  Description: You can add care plan individualizations to a care plan. Examples of Individualization might be:  \"Parent requests to be called daily at 9am for status\", \"I have a hard time hearing out of my right ear\", or \"Do not touch me to wake me up as it startlesme\".  Outcome: Progressing  Goal: Absence of Hospital-Acquired Illness or Injury  Outcome: Progressing  Intervention: Identify and Manage Fall Risk  Recent Flowsheet Documentation  Taken 4/25/2025 0300 by Ad Chavarria RN  Safety Promotion/Fall Prevention: safety round/check completed  Taken 4/25/2025 0200 by Ad Chavarria, RN  Safety Promotion/Fall Prevention: safety round/check completed  Taken 4/25/2025 0126 by Ad Chavarria, RN  Safety Promotion/Fall Prevention: safety round/check completed  Taken 4/25/2025 0100 by Ad Chavarria, RN  Safety Promotion/Fall Prevention:   patient and family education   nonskid " shoes/slippers when out of bed   lighting adjusted   room near nurse's station   safety round/check completed   increase visualization of patient   assistive device/personal items within reach   activity supervised  Taken 4/25/2025 0000 by Ad Chavarria RN  Safety Promotion/Fall Prevention: safety round/check completed  Taken 4/24/2025 2300 by Ad Chavarria RN  Safety Promotion/Fall Prevention: safety round/check completed  Intervention: Prevent and Manage VTE (Venous Thromboembolism) Risk  Recent Flowsheet Documentation  Taken 4/25/2025 0100 by Ad Chavarria RN  VTE Prevention/Management: SCDs off (sequential compression devices)  Intervention: Prevent Infection  Recent Flowsheet Documentation  Taken 4/25/2025 0100 by Ad Chavarria RN  Infection Prevention:   rest/sleep promoted   hand hygiene promoted   cohorting utilized  Goal: Optimal Comfort and Wellbeing  Outcome: Progressing  Goal: Readiness for Transition of Care  Outcome: Progressing     Problem: Delirium  Goal: Optimal Coping  Outcome: Progressing  Goal: Improved Behavioral Control  Outcome: Progressing  Intervention: Minimize Safety Risk  Recent Flowsheet Documentation  Taken 4/25/2025 0100 by Ad Chavarria RN  Enhanced Safety Measures:   room near unit station   review medications for side effects with activity   pain management  Goal: Improved Attention and Thought Clarity  Outcome: Progressing  Goal: Improved Sleep  Outcome: Progressing     Problem: Pain Acute  Goal: Optimal Pain Control and Function  Outcome: Progressing  Intervention: Prevent or Manage Pain  Recent Flowsheet Documentation  Taken 4/25/2025 0100 by Ad Chavarria RN  Medication Review/Management: medications reviewed     Problem: UTI (Urinary Tract Infection)  Goal: Improved Infection Symptoms  Outcome: Progressing   Goal Outcome Evaluation:      Plan of Care Reviewed With: patient          Outcome Evaluation: Pt states flexeril and valium helps.

## 2025-04-25 NOTE — PROGRESS NOTES
04/25/25 1600   Appointment Info   Signing Clinician's Name / Credentials (OT) Chinmay Brown EdD, OTR/L   Rehab Comments (OT) Initial evaluation and treatment   Living Environment   People in Home alone   Current Living Arrangements independent living facility  (pt lives on 4th floor of the building)   Home Accessibility no concerns   Transportation Anticipated agency   Living Environment Comments pt reports she has a daughter close by who helps at times.  Has a home health nursing coming 1x a week.  Nurse checks in on her and sets up her medication   Self-Care   Usual Activity Tolerance moderate   Current Activity Tolerance moderate   Equipment Currently Used at Home walker, rolling;other (see comments);grab bar, tub/shower;shower chair  (pt has 2 4WW with brakes, and an electric scooter.  Says she uses the scooter to get around her building.  It is too big for her friends to take when she goes out with them.)   Activity/Exercise/Self-Care Comment walk in shower with shower chair.  Laundry is in her unit.  Pt goes down to main floor to get her mail.  Facility has  that comes once a week to take people for groceries.  Uses scooter in store to shop.   General Information   Onset of Illness/Injury or Date of Surgery 04/25/25   Referring Physician Justin Molina   Patient/Family Therapy Goal Statement (OT) pt wants to return home but feels she has too much pain to do so right now.  Wants to go to a TCU for more rehab.   Existing Precautions/Restrictions fall   Cognitive Status Examination   Orientation Status orientation to person, place and time   Visual Perception   Visual Impairment/Limitations WFL   Pain Assessment   Patient Currently in Pain No   Range of Motion Comprehensive   General Range of Motion no range of motion deficits identified   Strength Comprehensive (MMT)   General Manual Muscle Testing (MMT) Assessment upper extremity strength deficits identified   Coordination   Upper Extremity Coordination  No deficits were identified   Bed Mobility   Bed Mobility supine-sit   Supine-Sit [place] (Bed Mobility) supervision;verbal cues;contact guard   Assistive Device (Bed Mobility) bed rails   Clinical Impression   Criteria for Skilled Therapeutic Interventions Met (OT) Yes, treatment indicated   OT Diagnosis decreased independence in ADLS   Influenced by the following impairments complaints of neck pain   OT Problem List-Impairments impacting ADL problems related to;activity tolerance impaired;pain;fear & anxiety   ADL comments/analysis pt complaining of neck pain which is limiting daily activities.  Mild deficits noted   Assessment of Occupational Performance 1-3 Performance Deficits   Identified Performance Deficits decreased independence with LE dressing ,endurance for mobility and IDLS at home   Planned Therapy Interventions (OT) ADL retraining;home program guidelines   Clinical Decision Making Complexity (OT) problem focused assessment/low complexity   Risk & Benefits of therapy have been explained evaluation/treatment results reviewed;care plan/treatment goals reviewed;patient   Clinical Impression Comments pt not demonstrating much outward signs of pain.  See performance during treatment session for details   OT Total Evaluation Time   OT Eval, Low Complexity Minutes (65502) 15   OT Goals   Therapy Frequency (OT) 3 times/week   OT Predicted Duration/Target Date for Goal Attainment 04/27/25   OT Goals OT Goal 1;OT Goal 2   OT: Goal 1 Pt will complete TB dressing with Mod I including AE   OT: Goal 2 Pt will identify 3 EC techniuqes to incresae endurance and independence in ADLS   Interventions   Interventions Quick Adds Self-Care/Home Management   Self-Care/Home Management   Self-Care/Home Mgmt/ADL, Compensatory, Meal Prep Minutes (33044) 25   Symptoms Noted During/After Treatment (Meal Preparation/Planning Training) fatigue;increased pain   Treatment Detail/Skilled Intervention Pt able to achieve a figure 4  "position to don and doff her left sock. Not able to do this with the right side but appears that could use AE for this side. Was also able to sit on the EOB and take off her bathrobe and put it back on again.  Stood with CGA and used walker to go to the bathroom and independently complete a toilet transfer.  Also got up independently and went to the sink to wash her hands.  Needed Min A to help get her right leg back into bed.  Made 2 verbal comments that she was in pain but did not have any groins or grimmaces with movement.  Had both hands up holding on to her neck at times to \"help keep it from tipping over\".  Reported findings to care coordinator at end of session.  Could benefit from skilled OT to use AE for dressing and ADLS and suggestions for EC techniques.  However, this could be done through a home OT visit if discharging.   OT Discharge Planning   OT Plan Check on status and discharge plans.  TB dressing with AE, EC techniques for home   OT Discharge Recommendation (DC Rec) home with home care occupational therapy;home with assist   OT Rationale for DC Rec Pt completed most activities asked of her during session with the exception of LE dressing for her right LE.  Pt able to identify an appropriate way to get help if had a fall at home, has family who provide some level of help.  Anticipate she would be able to return home with home services and help from family   OT Brief overview of current status Goals of therapy will be to address safe mobility and ADLS and make recommendations for discharge to the next level of care.  Pt and RN will continue to follow all fall risk precautions as documented by RN staff while hospitalized.   OT Total Distance Amb During Session (feet) 20   OT Equipment Needed at Discharge reacher;dressing equipment   Total Session Time   Timed Code Treatment Minutes 25   Total Session Time (sum of timed and untimed services) 40       "

## 2025-04-25 NOTE — PLAN OF CARE
PRIMARY DIAGNOSIS: ACUTE PAIN  OUTPATIENT/OBSERVATION GOALS TO BE MET BEFORE DISCHARGE:  1. Pain Status: Improved-controlled with oral pain medications.    2. Return to near baseline physical activity: Yes    3. Cleared for discharge by consultants (if involved): No    Discharge Planner Nurse   Safe discharge environment identified:  pending  Barriers to discharge: No, continues on IV abx       Entered by: Rocio Wilson RN 04/24/2025 9:56 PM     Please review provider order for any additional goals.   Nurse to notify provider when observation goals have been met and patient is ready for discharge.    Pt. A&Ox4, up with SBA and walker, Lung sounds clear, room air sat's at 96%. Pt. Continues to complain of neck pain flexeril and valium given per pt. Request and voltaren gel placed on the back on neck. Pt. Reports that she is able to head a little more side to side. Abx (Rocephin) for UTI. Pt. Denies any needs at this time. Will continue with POC.     Goal Outcome Evaluation  CT scan completed, pt. continues to c/o neck pain, received flexeril and valium, Abx (Rocephin) for UTI.  Plan of Care Reviewed With  patient  Overall Patient Progress  improving         Problem: Adult Inpatient Plan of Care  Goal: Plan of Care Review  Description: The Plan of Care Review/Shift note should be completed every shift.  The Outcome Evaluation is a brief statement about your assessment that the patient is improving, declining, or no change.  This information will be displayed automatically on your shiftnote.  4/24/2025 2151 by Rocio Wilson RN  Outcome: Progressing  Flowsheets (Taken 4/24/2025 2151)  Outcome Evaluation: CT scan completed, pt. continues to c/o neck pain, received flexeril and valium, Abx (Rocephin) for UTI.  Plan of Care Reviewed With: patient  Overall Patient Progress: improving  4/24/2025 2150 by Rocio Wilson, RN  Outcome: Progressing  Goal: Patient-Specific Goal (Individualized)  Description: You can add care  "plan individualizations to a care plan. Examples of Individualization might be:  \"Parent requests to be called daily at 9am for status\", \"I have a hard time hearing out of my right ear\", or \"Do not touch me to wake me up as it startlesme\".  4/24/2025 2151 by Rocio Wilson RN  Outcome: Progressing  4/24/2025 2150 by Rocio Wilson RN  Outcome: Progressing  Goal: Absence of Hospital-Acquired Illness or Injury  4/24/2025 2151 by Rocio Wilson RN  Outcome: Progressing  4/24/2025 2150 by Rocio Wilson RN  Outcome: Progressing  Intervention: Identify and Manage Fall Risk  Recent Flowsheet Documentation  Taken 4/24/2025 2114 by Rocio Wilson RN  Safety Promotion/Fall Prevention:   clutter free environment maintained   increased rounding and observation   increase visualization of patient   lighting adjusted   mobility aid in reach   safety round/check completed  Intervention: Prevent Skin Injury  Recent Flowsheet Documentation  Taken 4/24/2025 2114 by Rocio Wilson RN  Body Position: weight shifting  Intervention: Prevent and Manage VTE (Venous Thromboembolism) Risk  Recent Flowsheet Documentation  Taken 4/24/2025 2114 by Rocio Wilson RN  VTE Prevention/Management: (Pt. on Eliquis) other (see comments)  Intervention: Prevent Infection  Recent Flowsheet Documentation  Taken 4/24/2025 2114 by Rocio Wilson RN  Infection Prevention: hand hygiene promoted  Goal: Optimal Comfort and Wellbeing  4/24/2025 2151 by Rocio Wilson RN  Outcome: Progressing  4/24/2025 2150 by Rocio Wilson RN  Outcome: Progressing  Intervention: Monitor Pain and Promote Comfort  Recent Flowsheet Documentation  Taken 4/24/2025 2114 by Rocio Wilson RN  Pain Management Interventions: medication (see MAR)  Intervention: Provide Person-Centered Care  Recent Flowsheet Documentation  Taken 4/24/2025 2114 by Rocio Wilson RN  Trust Relationship/Rapport: care explained  Goal: Readiness for Transition of Care  4/24/2025 2151 " by Wilson, Rocio C, RN  Outcome: Progressing  4/24/2025 2150 by Rocio Wilson RN  Outcome: Progressing     Problem: Delirium  Goal: Optimal Coping  4/24/2025 2151 by Rocio Wilson RN  Outcome: Progressing  4/24/2025 2150 by Rocio Wilson RN  Outcome: Progressing  Goal: Improved Behavioral Control  4/24/2025 2151 by Rocio Wilson RN  Outcome: Progressing  4/24/2025 2150 by Rocio Wilson RN  Outcome: Progressing  Intervention: Minimize Safety Risk  Recent Flowsheet Documentation  Taken 4/24/2025 2114 by Rocio Wilson RN  Enhanced Safety Measures: pain management  Trust Relationship/Rapport: care explained  Goal: Improved Attention and Thought Clarity  4/24/2025 2151 by Rocio Wilson RN  Outcome: Progressing  4/24/2025 2150 by Rocio Wilson RN  Outcome: Progressing  Goal: Improved Sleep  4/24/2025 2151 by Rocio Wilson RN  Outcome: Progressing  4/24/2025 2150 by Rocio Wilson RN  Outcome: Progressing     Problem: Pain Acute  Goal: Optimal Pain Control and Function  4/24/2025 2151 by Rocio Wilson RN  Outcome: Progressing  4/24/2025 2150 by Rocio Wilson RN  Outcome: Progressing  Intervention: Develop Pain Management Plan  Recent Flowsheet Documentation  Taken 4/24/2025 2114 by Rocio Wilson RN  Pain Management Interventions: medication (see MAR)  Intervention: Prevent or Manage Pain  Recent Flowsheet Documentation  Taken 4/24/2025 2114 by Rocio Wilson RN  Medication Review/Management: medications reviewed     Problem: UTI (Urinary Tract Infection)  Goal: Improved Infection Symptoms  4/24/2025 2151 by Rocio Wilson RN  Outcome: Progressing  4/24/2025 2150 by Rocio Wilson RN  Outcome: Progressing

## 2025-04-25 NOTE — PLAN OF CARE
"Orientation: AOx4  Neuro: wnl  Pain status: 7/10 neck pain, prn Flexeril and Valium ok to give together   Activity: Assist x1  Peripheral edema: wnl   Resp: wnl  Cardiac: wnl, permanent pacemaker  GI: wnl  :  incont  Skin: scattered bruising  LDA: PIV  Infusions: SL   Diet: mod carb diet 60 g  Consults: OT and SW, Neurology signed off  Discharge Plan: Pain management  IV abx Rocephin switched to oral Omnicef. OT recommend OT homecare.     Goal Outcome Evaluation:      Plan of Care Reviewed With: patient    Overall Patient Progress: improvingOverall Patient Progress: improving    Outcome Evaluation: AOX4, VSS, states pain is improving 7/10 with flexeril and valium, OT consult made.      Problem: Adult Inpatient Plan of Care  Goal: Plan of Care Review  Description: The Plan of Care Review/Shift note should be completed every shift.  The Outcome Evaluation is a brief statement about your assessment that the patient is improving, declining, or no change.  This information will be displayed automatically on your shiftnote.  Outcome: Progressing  Flowsheets (Taken 4/25/2025 1057)  Outcome Evaluation: AOX4, VSS, states pain is improving 7/10 with flexeril and valium, OT consult made.  Plan of Care Reviewed With: patient  Overall Patient Progress: improving  Goal: Patient-Specific Goal (Individualized)  Description: You can add care plan individualizations to a care plan. Examples of Individualization might be:  \"Parent requests to be called daily at 9am for status\", \"I have a hard time hearing out of my right ear\", or \"Do not touch me to wake me up as it startlesme\".  Outcome: Progressing  Goal: Absence of Hospital-Acquired Illness or Injury  Outcome: Progressing  Intervention: Identify and Manage Fall Risk  Recent Flowsheet Documentation  Taken 4/25/2025 0840 by Kinsey Ferrer RN  Safety Promotion/Fall Prevention: safety round/check completed  Intervention: Prevent Skin Injury  Recent Flowsheet Documentation  Taken " 4/25/2025 0840 by Kinsey Ferrer RN  Body Position: position changed independently  Skin Protection: adhesive use limited  Intervention: Prevent and Manage VTE (Venous Thromboembolism) Risk  Recent Flowsheet Documentation  Taken 4/25/2025 0840 by Kinsey Ferrer RN  VTE Prevention/Management: SCDs off (sequential compression devices)  Intervention: Prevent Infection  Recent Flowsheet Documentation  Taken 4/25/2025 0840 by Kinsey Ferrer RN  Infection Prevention:   rest/sleep promoted   hand hygiene promoted   cohorting utilized  Goal: Optimal Comfort and Wellbeing  Outcome: Progressing  Intervention: Monitor Pain and Promote Comfort  Recent Flowsheet Documentation  Taken 4/25/2025 0840 by Kinsey Ferrer RN  Pain Management Interventions: medication (see MAR)  Goal: Readiness for Transition of Care  Outcome: Progressing     Problem: Delirium  Goal: Optimal Coping  Outcome: Progressing  Goal: Improved Behavioral Control  Outcome: Progressing  Intervention: Minimize Safety Risk  Recent Flowsheet Documentation  Taken 4/25/2025 0840 by Kinsey Ferrer RN  Enhanced Safety Measures:   review medications for side effects with activity   pain management  Goal: Improved Attention and Thought Clarity  Outcome: Progressing  Goal: Improved Sleep  Outcome: Progressing     Problem: Pain Acute  Goal: Optimal Pain Control and Function  Outcome: Progressing  Intervention: Develop Pain Management Plan  Recent Flowsheet Documentation  Taken 4/25/2025 0840 by Kinsey Ferrer RN  Pain Management Interventions: medication (see MAR)  Intervention: Prevent or Manage Pain  Recent Flowsheet Documentation  Taken 4/25/2025 0840 by Kinsey Ferrer RN  Medication Review/Management: medications reviewed     Problem: UTI (Urinary Tract Infection)  Goal: Improved Infection Symptoms  Outcome: Progressing

## 2025-04-26 ENCOUNTER — APPOINTMENT (OUTPATIENT)
Dept: PHYSICAL THERAPY | Facility: CLINIC | Age: 85
DRG: 556 | End: 2025-04-26
Attending: INTERNAL MEDICINE
Payer: COMMERCIAL

## 2025-04-26 VITALS
SYSTOLIC BLOOD PRESSURE: 131 MMHG | RESPIRATION RATE: 18 BRPM | TEMPERATURE: 97.6 F | DIASTOLIC BLOOD PRESSURE: 69 MMHG | HEART RATE: 65 BPM | WEIGHT: 230.82 LBS | BODY MASS INDEX: 32.19 KG/M2 | OXYGEN SATURATION: 98 %

## 2025-04-26 PROCEDURE — 99239 HOSP IP/OBS DSCHRG MGMT >30: CPT | Performed by: INTERNAL MEDICINE

## 2025-04-26 PROCEDURE — 97530 THERAPEUTIC ACTIVITIES: CPT | Mod: GP | Performed by: PHYSICAL THERAPIST

## 2025-04-26 PROCEDURE — 250N000013 HC RX MED GY IP 250 OP 250 PS 637: Performed by: HOSPITALIST

## 2025-04-26 PROCEDURE — 97110 THERAPEUTIC EXERCISES: CPT | Mod: GP | Performed by: PHYSICAL THERAPIST

## 2025-04-26 PROCEDURE — 97161 PT EVAL LOW COMPLEX 20 MIN: CPT | Mod: GP | Performed by: PHYSICAL THERAPIST

## 2025-04-26 PROCEDURE — 250N000013 HC RX MED GY IP 250 OP 250 PS 637: Performed by: INTERNAL MEDICINE

## 2025-04-26 PROCEDURE — 97116 GAIT TRAINING THERAPY: CPT | Mod: GP | Performed by: PHYSICAL THERAPIST

## 2025-04-26 RX ORDER — NICOTINE POLACRILEX 4 MG
15-30 LOZENGE BUCCAL
Status: DISCONTINUED | OUTPATIENT
Start: 2025-04-26 | End: 2025-04-26 | Stop reason: HOSPADM

## 2025-04-26 RX ORDER — DEXTROSE MONOHYDRATE 25 G/50ML
25-50 INJECTION, SOLUTION INTRAVENOUS
Status: DISCONTINUED | OUTPATIENT
Start: 2025-04-26 | End: 2025-04-26 | Stop reason: HOSPADM

## 2025-04-26 RX ORDER — CYCLOBENZAPRINE HCL 5 MG
5 TABLET ORAL 3 TIMES DAILY PRN
Qty: 30 TABLET | Refills: 0 | Status: SHIPPED | OUTPATIENT
Start: 2025-04-26

## 2025-04-26 RX ORDER — CEFDINIR 300 MG/1
300 CAPSULE ORAL EVERY 12 HOURS
Qty: 12 CAPSULE | Refills: 0 | Status: SHIPPED | OUTPATIENT
Start: 2025-04-26 | End: 2025-05-02

## 2025-04-26 RX ORDER — DIAZEPAM 5 MG/1
5 TABLET ORAL EVERY 8 HOURS PRN
Qty: 20 TABLET | Refills: 0 | Status: SHIPPED | OUTPATIENT
Start: 2025-04-26

## 2025-04-26 RX ADMIN — DIAZEPAM 5 MG: 5 TABLET ORAL at 06:58

## 2025-04-26 RX ADMIN — APIXABAN 5 MG: 5 TABLET, FILM COATED ORAL at 09:26

## 2025-04-26 RX ADMIN — DICLOFENAC SODIUM 2 G: 10 GEL TOPICAL at 09:26

## 2025-04-26 RX ADMIN — CYCLOBENZAPRINE HYDROCHLORIDE 7.5 MG: 5 TABLET, FILM COATED ORAL at 06:58

## 2025-04-26 RX ADMIN — METOPROLOL SUCCINATE 25 MG: 25 TABLET, EXTENDED RELEASE ORAL at 09:26

## 2025-04-26 RX ADMIN — DICLOFENAC SODIUM 2 G: 10 GEL TOPICAL at 13:11

## 2025-04-26 RX ADMIN — CEFDINIR 300 MG: 300 CAPSULE ORAL at 09:26

## 2025-04-26 RX ADMIN — GLIPIZIDE 2.5 MG: 2.5 TABLET, EXTENDED RELEASE ORAL at 09:26

## 2025-04-26 RX ADMIN — DICLOFENAC SODIUM 2 G: 10 GEL TOPICAL at 15:55

## 2025-04-26 RX ADMIN — PANTOPRAZOLE SODIUM 40 MG: 40 TABLET, DELAYED RELEASE ORAL at 09:26

## 2025-04-26 ASSESSMENT — ACTIVITIES OF DAILY LIVING (ADL)
ADLS_ACUITY_SCORE: 44
ADLS_ACUITY_SCORE: 41
ADLS_ACUITY_SCORE: 44
ADLS_ACUITY_SCORE: 41
ADLS_ACUITY_SCORE: 44
ADLS_ACUITY_SCORE: 41
ADLS_ACUITY_SCORE: 41
ADLS_ACUITY_SCORE: 44
ADLS_ACUITY_SCORE: 41
ADLS_ACUITY_SCORE: 44
ADLS_ACUITY_SCORE: 44
ADLS_ACUITY_SCORE: 41

## 2025-04-26 NOTE — DISCHARGE SUMMARY
[facility]  Discharge Summary  Hospitalist      Date of Admission:  4/23/2025  Date of Discharge:  4/26/2025  Provider:  Shai Lovell MD. [facility]  Date of Service (when I last saw the patient): 04/26/25      Primary Provider: Patti Suárez          Discharge Diagnosis:     Discharge Diagnoses   Acute neck pain and muscle spasm  Tension headache  Urinary tract infection  Acute kidney injury, improving  Immune thrombocytopenia (ITP), chronic, stable  Ischemic optic neuropathy, right eye  Coronary artery disease (CAD), stable  Heart failure with preserved ejection fraction (HFpEF), stable  Atrial fibrillation with pacemaker, rate controlled  Type 2 diabetes mellitus, controlled  Chronic kidney disease, stable  Obstructive sleep apnea, noncompliant with CPAP    Other medical issues:  Past Medical History:   Diagnosis Date    Abnormal WBC count 04/12/2024    Acute encephalopathy 09/21/2023    Acute posthemorrhagic anemia 06/19/2023    Anemia     Iron Deficiency anemia    Anemia due to blood loss, acute 06/13/2023    Anticoagulated on Coumadin 04/12/2024    Atrial fibrillation (H)     Atrial fibrillation, unspecified type (H) 06/26/2023    Bony pelvic pain 07/06/2022    C. difficile colitis, recurrent -- she needs to take Vanco 125 bid anytime she is on a different Abx  03/18/2022    CAD (coronary artery disease)     non-obstructive    Candidiasis of skin 08/13/2019    Chest pain, unspecified type 06/13/2023    Chronic diarrhea 04/12/2024    Chronic pain     neck, low back, legs    Colonic diverticular abscess 04/01/2024    Confusion associated with infection 09/21/2023    Congestive heart failure (H)     Contusion of lower back and pelvis, subsequent encounter 06/26/2023    Contusion of right thigh, subsequent encounter 06/19/2023    Degenerative disk disease     Diabetes (H)     Diarrhea 08/28/2023    Diverticulitis 04/01/2024    Diverticulitis of large intestine with abscess,  unspecified bleeding status 04/12/2024    Fibromyalgia     Gastro-oesophageal reflux disease     Gout     Hematoma of buttock 04/01/2024    Hiatal hernia     Iron deficiency anemia 11/19/2014    Long term current use of anticoagulant therapy 06/26/2023    Mumps     Neuropathy     Noninfectious gastroenteritis 08/14/2015    CRISTIANA (obstructive sleep apnea) - CPAP     Palpitations     Pernicious anemia     Rash and nonspecific skin eruption 04/15/2024    Right-sided chest wall pain 06/19/2023    Skin yeast infection 06/26/2023    Sleep apnea     uses CPAP.    Subtherapeutic international normalized ratio (INR) 06/13/2023    Traumatic hematoma of buttock, subsequent encounter 06/13/2023    Urinary incontinence     Vitamin D deficiency          Please see the admission history and physical for full details.     Hospital Course     Savanna Rehman was admitted on 4/23/2025.  The following problems were addressed during her hospitalization:    History of Presentation and Hospital Course:  84-year-old female with complex past medical history including immune thrombocytopenia (ITP), type 2 diabetes mellitus, nonalcoholic steatohepatitis (FERREIRA) cirrhosis, obstructive sleep apnea (CRISTIANA), atrial fibrillation with pacemaker, coronary artery disease (CAD), heart failure with preserved ejection fraction (HFpEF), chronic kidney disease (CKD), and remote diverticulitis was admitted for evaluation of neck pain associated with headache. Symptoms improved with muscle relaxants (Flexeril) and anxiolytics (Valium). She was seen by both Physical Therapy and Occupational Therapy, and both therapists deemed her appropriate for discharge home with home health services and therapy. They did not recommend a transitional care unit (TCU). Initially, the patient was reluctant and requested TCU placement; however, after evaluation by two different therapists and counseling, she agreed to the plan to discharge home with outpatient support. She  remains medically stable, with discharge planning finalized. A new urinary tract infection was identified during hospitalization and transitioned to oral antibiotics.     Neck pain and headache:  Continue Flexeril and Valium as needed for muscle spasm, but avoid taking both medications simultaneously.   patient to use extreme caution with Flexeril and Valium, particularly regarding potential dizziness, unsteady gait, and fall risk.  CT cervical spine Multilevel spondylosis without high grade canal stenosis.   Scheduled outpatient neurology follow-up for headache management.  Use heat and neck brace as supportive measures.  Ischemic optic neuropathy:  Continue apixaban for secondary stroke prevention.  Acute kidney injury:  Monitor daily BMP until stable.  Emphasize hydration and avoidance of nephrotoxic medications.  Urinary tract infection:  Continue oral cefdinir to complete a 7-day course (initiated 4/25).  Monitor for symptom resolution.  Immune thrombocytopenia (ITP):  Stable; continue outpatient hematology follow-up with Dr. Pompa.  Coronary artery disease and heart failure with preserved ejection fraction (HFpEF):  Continue home medications including Lasix and Toprol XL.  Atrial fibrillation with pacemaker:  Continue Toprol XL and apixaban.  Type 2 diabetes mellitus:  Continue glipizide.  A1c 6.6%, no inpatient glucose checks needed.  Obstructive sleep apnea:  Patient noncompliant with CPAP; no acute intervention needed during admission.    Pending Results   Unresulted Labs Ordered in the Past 30 Days of this Admission       No orders found from 3/24/2025 to 4/24/2025.            Discharge Orders      Home Care Referral      Reason for your hospital stay    Neck pain     Activity    Your activity upon discharge: activity as tolerated     Diet    Follow this diet upon discharge: Current Diet:Orders Placed This Encounter      Moderate Consistent Carb (60 g CHO per Meal) Diet     Hospital Follow-up with  Existing Primary Care Provider (PCP)            Code Status   DNR / DNI       Primary Care Physician   Patti Suárez    Physical Exam   Temp: 97.6  F (36.4  C) Temp src: Oral BP: 131/69 Pulse: 65   Resp: 18 SpO2: 98 % O2 Device: None (Room air)    Vitals:    04/23/25 1518   Weight: 104.7 kg (230 lb 13.2 oz)     Vital Signs with Ranges  Temp:  [97.6  F (36.4  C)-98  F (36.7  C)] 97.6  F (36.4  C)  Pulse:  [61-76] 65  Resp:  [18-20] 18  BP: (115-157)/(62-77) 131/69  SpO2:  [92 %-98 %] 98 %  I/O last 3 completed shifts:  In: 480 [P.O.:480]  Out: 750 [Urine:750]    Constitutional:  alert, cooperative, no apparent distress  Respiratory: No increased work of breathing, good air exchange, no crackles or wheezing.  Cardiovascular: apical impulse,normal S1 and S2  GI: bowel sounds present, soft, non-distended, non-tender      Discharge Disposition   Discharged to home    Consultations This Hospital Stay   CARE MANAGEMENT / SOCIAL WORK IP CONSULT  NEUROLOGY IP CONSULT  OCCUPATIONAL THERAPY ADULT IP CONSULT  SOCIAL WORK IP CONSULT  PHYSICAL THERAPY ADULT IP CONSULT    Time Spent on this Encounter   I, Shai Lovell MD, personally saw the patient today and spent greater than 30 minutes discharging this patient.      Discharge Medications   Current Discharge Medication List        START taking these medications    Details   cefdinir (OMNICEF) 300 MG capsule Take 1 capsule (300 mg) by mouth every 12 hours for 6 days.  Qty: 12 capsule, Refills: 0    Associated Diagnoses: Urinary tract infection without hematuria, site unspecified      cyclobenzaprine (FLEXERIL) 5 MG tablet Take 1 tablet (5 mg) by mouth 3 times daily as needed for muscle spasms.  Qty: 30 tablet, Refills: 0    Associated Diagnoses: Acute intractable headache, unspecified headache type      diazepam (VALIUM) 5 MG tablet Take 1 tablet (5 mg) by mouth every 8 hours as needed for muscle spasms.  Qty: 20 tablet, Refills: 0    Associated Diagnoses:  Acute intractable headache, unspecified headache type      diclofenac (VOLTAREN) 1 % topical gel Apply 2 g topically 4 times daily.  Qty: 50 g, Refills: 0    Associated Diagnoses: Acute intractable headache, unspecified headache type           CONTINUE these medications which have NOT CHANGED    Details   apixaban ANTICOAGULANT (ELIQUIS) 2.5 MG tablet Take 2 tablets (5 mg) by mouth 2 times daily.  Qty: 120 tablet, Refills: 0      blood glucose (NO BRAND SPECIFIED) test strip Use to test blood sugar one times daily or as directed. To accompany: Blood Glucose Monitor Brands: per insurance.  Qty: 100 strip, Refills: 6    Associated Diagnoses: Type 2 diabetes mellitus with stage 3a chronic kidney disease, without long-term current use of insulin (H)      blood glucose monitoring (NO BRAND SPECIFIED) meter device kit Use to test blood sugar one times daily or as directed. Preferred blood glucose meter OR supplies to accompany: Blood Glucose Monitor Brands: per insurance.  Qty: 1 kit, Refills: 0    Associated Diagnoses: Type 2 diabetes mellitus with stage 3a chronic kidney disease, without long-term current use of insulin (H)      butalbital-aspirin-caffeine (FIORINAL) -40 MG capsule Take 1 capsule by mouth every 8 hours as needed for headaches.  Qty: 30 capsule, Refills: 0    Comments: Patient denies ASA allergy. She had tolerated Fiorinal in the past  Associated Diagnoses: Migraine without aura and without status migrainosus, not intractable      cholecalciferol (VITAMIN D3) 10 mcg (400 units) TABS tablet Take 10 mcg by mouth daily.      EPINEPHrine (ANY BX GENERIC EQUIV) 0.3 MG/0.3ML injection 2-pack Inject 0.3 mLs (0.3 mg) into the muscle as needed for anaphylaxis May repeat one time in 5-15 minutes if response to initial dose is inadequate.  Qty: 2 each, Refills: 3    Associated Diagnoses: Angioedema, initial encounter      furosemide (LASIX) 40 MG tablet Take 1 tablet by mouth once daily  Qty: 90 tablet,  Refills: 1    Associated Diagnoses: Congestive heart failure, unspecified HF chronicity, unspecified heart failure type (H); Swelling of lower leg      glipiZIDE (GLUCOTROL XL) 2.5 MG 24 hr tablet Take 1 tablet by mouth twice daily  Qty: 180 tablet, Refills: 3    Associated Diagnoses: Diabetic polyneuropathy associated with type 2 diabetes mellitus (H)      metoprolol succinate ER (TOPROL XL) 25 MG 24 hr tablet Take 1 tablet (25 mg) by mouth daily.  Qty: 30 tablet, Refills: 11    Associated Diagnoses: Atrial fibrillation (H)      omeprazole (PRILOSEC) 20 MG DR capsule Take 20 mg by mouth daily.      thin (NO BRAND SPECIFIED) lancets Use with lanceting device. To accompany: Blood Glucose Monitor Brands: per insurance.  Qty: 100 each, Refills: 6    Associated Diagnoses: Type 2 diabetes mellitus with stage 3a chronic kidney disease, without long-term current use of insulin (H)           Allergies   Allergies   Allergen Reactions    Augmented Betamethasone Diprop [Betamethasone] Other (See Comments)     Severe yeast infection    Petrolatum Anaphylaxis and Swelling     Rash and swelling    Shellfish-Derived Products Anaphylaxis     Tongue swelling    Bacitracin      Rash swelling    Bactrim [Sulfamethoxazole-Trimethoprim] Dizziness    Darvon [Propoxyphene] Swelling     Throat closes    Dilaudid [Hydromorphone]      No side effects from fentanyl June 2023    Levaquin [Levofloxacin] Swelling     Tongue swelling    Lidocaine      Facial swelling     Morphine Unknown     [facility]24    Neomycin Swelling     rash    Neosporin [Neomycin-Polymyxin-Gramicidin] Swelling     rash    Nitrofurantoin      SOB, GI upset,    Oxycodone      Severe itching    Percocet [Oxycodone-Acetaminophen] Unknown    Percodan [Oxycodone-Aspirin]      Severe itching    Polymyxin B     Pramoxine     Tramadol     Vicodin [Hydrocodone-Acetaminophen]      Severe itching      Xarelto [Rivaroxaban]     Adhesive Tape Rash     Band aids      Codeine Rash    Hydrocortisone Rash and Swelling    Other Environmental Allergy Rash     Adhesive tape   Band aids      Data   Most Recent 3 CBC's:  Recent Labs   Lab Test 04/24/25  0456 04/23/25  0953 04/10/25  1128   WBC 6.2 6.2 4.9   HGB 11.9 13.5 12.0   MCV 91 90 92   * 127* 127*      Most Recent 3 BMP's:  Recent Labs   Lab Test 04/25/25  0155 04/24/25  2145 04/24/25  0456 04/23/25  0955 04/23/25  0953 04/10/25  1133 04/10/25  1128   NA  --   --  136  --  136  --  138   POTASSIUM  --   --  4.0  --  3.8  --  4.3   CHLORIDE  --   --  100  --  95*  --  101   CO2  --   --  28  --  29  --  27   BUN  --   --  15.0  --  18.1  --  14.5   CR  --   --  0.99*  --  1.40* 1.0 0.96*   ANIONGAP  --   --  8  --  12  --  10   SAUL  --   --  8.7*  --  9.3  --  9.0   * 138* 139*   < > 153*  --  133*    < > = values in this interval not displayed.     Most Recent 2 LFT's:  Recent Labs   Lab Test 01/31/25  1137 08/31/24  0714   AST 39 46*   ALT 25 30   ALKPHOS 138 113   BILITOTAL 1.4* 1.9*     Most Recent INR's and Anticoagulation Dosing History:  Anticoagulation Dose History  More data exists         Latest Ref Rng & Units 8/20/2024 8/27/2024 8/30/2024 9/1/2024 11/15/2024 4/10/2025 4/23/2025   Recent Dosing and Labs   INR 0.85 - 1.15 1.8     3.6     2.04  1.41  1.2     1.25  1.44       Details          This result is from an external source.             Most Recent 3 Troponin's:  Recent Labs   Lab Test 01/27/20  1025 01/04/18  2327   TROPI <0.015 <0.015     Most Recent Cholesterol Panel:  Recent Labs   Lab Test 10/19/23  1035   CHOL 136   LDL 78   HDL 42*   TRIG 79     Most Recent 6 Bacteria Isolates From Any Culture (See EPIC Reports for Culture Details):  Recent Labs   Lab Test 07/08/21  0953 04/05/21  0819 03/16/21  1430 02/18/21  1111 01/28/21  1015 12/04/20  1850   CULT >100,000 colonies/mL  Escherichia coli  *  >100,000 colonies/mL  Strain 2  Escherichia coli  * >100,000 colonies/mL  Escherichia coli  *  10,000 to 50,000 colonies/mL  mixed urogenital thao  Susceptibility testing not routinely done   <10,000 colonies/mL  mixed urogenital thao  Susceptibility testing not routinely done   >100,000 colonies/mL  Enterobacter cloacae complex  * >100,000 colonies/mL  Citrobacter freundii complex  *     Most Recent TSH, T4 and A1c Labs:  Recent Labs   Lab Test 01/31/25  1137 07/05/24  1547 07/05/24  1527   TSH  --   --  2.63   A1C 6.6*   < >  --     < > = values in this interval not displayed.     Results for orders placed or performed during the hospital encounter of 04/23/25   CT Head w/o Contrast    Narrative    EXAM: CT HEAD W/O CONTRAST, CTA HEAD NECK W CONTRAST  LOCATION: [facility]  DATE: 4/23/2025    INDICATION: Code Stroke, rule out hemorrhage and evaluate for potential thrombolysis thrombectomy.   COMPARISON: Brain MR 4/15/2025. CTA 4/10/2025.  CONTRAST: 67 mL Isovue 370.  TECHNIQUE: Head and neck CT angiogram with IV contrast. Noncontrast head CT followed by axial helical CT images of the head and neck vessels obtained during the arterial phase of intravenous contrast administration. Axial 2D reconstructed images and   multiplanar 3D MIP reconstructed images of the head and neck vessels were performed by the technologist. Dose reduction techniques were used. All stenosis measurements made according to NASCET criteria unless otherwise specified.    FINDINGS:   NONCONTRAST HEAD CT:   INTRACRANIAL CONTENTS: No acute intracranial hemorrhage. No mass effect or midline shift. Moderate presumed chronic small vessel ischemic changes. Moderate generalized volume loss. No hydrocephalus.     VISUALIZED ORBITS/SINUSES/MASTOIDS: No intraorbital abnormality. No paranasal sinus mucosal disease. No middle ear or mastoid effusion.    BONES/SOFT TISSUES: No acute abnormality.    HEAD CTA:  No vascular cutoff of the proximal major intracranial arteries involving the ACAs, MCAs, or PCAs. Moderate focal  stenosis of the mid left P2 segment, similar to prior CTA 4/10/2025. Fetal origin of the left PCA, an anatomic variant and unchanged. No   intracranial aneurysm.    Small or absent left A1 segment with patent anterior communicating artery, likely an anatomic variant.    NECK CTA:  RIGHT CAROTID: Atherosclerotic plaque results in less than 50% stenosis in the right ICA. No dissection.    LEFT CAROTID: Atherosclerotic plaque results in less than 50% stenosis in the left ICA. No dissection.    VERTEBRAL ARTERIES: No focal stenosis or dissection. Balanced vertebral arteries.    AORTIC ARCH: Classic aortic arch anatomy with no significant stenosis at the origin of the great vessels.    NONVASCULAR STRUCTURES: Unremarkable.      Impression    IMPRESSION:   HEAD CT:  1.  No CT evidence for acute intracranial process.  2.  Brain atrophy and presumed chronic microvascular ischemic changes as above.    HEAD CTA:   1.  No vascular cutoff of the proximal major intracranial arteries.  2.  Moderate focal stenosis of the mid left P2 segment, also present on 4/10/2025.    NECK CTA:  1.  Patent arteries in the neck without evidence of dissection.  2.  Atherosclerotic disease along the carotid arteries without significant stenosis by NASCET criteria.    Results discussed with Dr. Jackson at 10:09 AM on 4/23/2025.   CTA Head Neck with Contrast    Narrative    EXAM: CT HEAD W/O CONTRAST, CTA HEAD NECK W CONTRAST  LOCATION: [facility]  DATE: 4/23/2025    INDICATION: Code Stroke, rule out hemorrhage and evaluate for potential thrombolysis thrombectomy.   COMPARISON: Brain MR 4/15/2025. CTA 4/10/2025.  CONTRAST: 67 mL Isovue 370.  TECHNIQUE: Head and neck CT angiogram with IV contrast. Noncontrast head CT followed by axial helical CT images of the head and neck vessels obtained during the arterial phase of intravenous contrast administration. Axial 2D reconstructed images and   multiplanar 3D MIP reconstructed images  of the head and neck vessels were performed by the technologist. Dose reduction techniques were used. All stenosis measurements made according to NASCET criteria unless otherwise specified.    FINDINGS:   NONCONTRAST HEAD CT:   INTRACRANIAL CONTENTS: No acute intracranial hemorrhage. No mass effect or midline shift. Moderate presumed chronic small vessel ischemic changes. Moderate generalized volume loss. No hydrocephalus.     VISUALIZED ORBITS/SINUSES/MASTOIDS: No intraorbital abnormality. No paranasal sinus mucosal disease. No middle ear or mastoid effusion.    BONES/SOFT TISSUES: No acute abnormality.    HEAD CTA:  No vascular cutoff of the proximal major intracranial arteries involving the ACAs, MCAs, or PCAs. Moderate focal stenosis of the mid left P2 segment, similar to prior CTA 4/10/2025. Fetal origin of the left PCA, an anatomic variant and unchanged. No   intracranial aneurysm.    Small or absent left A1 segment with patent anterior communicating artery, likely an anatomic variant.    NECK CTA:  RIGHT CAROTID: Atherosclerotic plaque results in less than 50% stenosis in the right ICA. No dissection.    LEFT CAROTID: Atherosclerotic plaque results in less than 50% stenosis in the left ICA. No dissection.    VERTEBRAL ARTERIES: No focal stenosis or dissection. Balanced vertebral arteries.    AORTIC ARCH: Classic aortic arch anatomy with no significant stenosis at the origin of the great vessels.    NONVASCULAR STRUCTURES: Unremarkable.      Impression    IMPRESSION:   HEAD CT:  1.  No CT evidence for acute intracranial process.  2.  Brain atrophy and presumed chronic microvascular ischemic changes as above.    HEAD CTA:   1.  No vascular cutoff of the proximal major intracranial arteries.  2.  Moderate focal stenosis of the mid left P2 segment, also present on 4/10/2025.    NECK CTA:  1.  Patent arteries in the neck without evidence of dissection.  2.  Atherosclerotic disease along the carotid arteries  without significant stenosis by NASCET criteria.    Results discussed with Dr. Jackson at 10:09 AM on 4/23/2025.   CT Cervical Spine w/o Contrast    Narrative    EXAM: CT CERVICAL SPINE W/O CONTRAST  LOCATION: [facility]  DATE: 4/24/2025    INDICATION: Neck pain  COMPARISON: CTA neck 4/10/2025  TECHNIQUE: Routine CT Cervical Spine without IV contrast. Multiplanar reformats. Dose reduction techniques were used.    FINDINGS:  VERTEBRA: Normal vertebral body heights and alignment. No acute compression fracture or posttraumatic subluxation.     CANAL/FORAMINA: Multilevel spondylosis without high grade canal stenosis.    PARASPINAL: No prevertebral edema.      Impression    IMPRESSION:  1.  No acute cervical spine fracture.  2.  Multilevel spondylosis without high grade canal stenosis.       *Note: Due to a large number of results and/or encounters for the requested time period, some results have not been displayed. A complete set of results can be found in Results Review.           Disclaimer: This note consists of symbols derived from keyboarding, dictation and/or voice recognition software. As a result, there may be errors in the script that have gone undetected. Please consider this when interpreting information found in this chart.

## 2025-04-26 NOTE — PLAN OF CARE
"Orientation: AOx4  Neuro: wnl  Pain status: 7/10 neck pain, prn Flexeril and Valium ok to give together   Activity: assist x1  Peripheral edema: wnl   Resp: wnl  Cardiac: wnl, permanent pacemaker  GI: wnl  :  incont  Skin: scattered bruising  LDA: PIV  Infusions: SL    Diet: Mod carb diet 60g  Consults: OT, PT, SW   Discharge Plan: discharge with home care services, family will transport.       Goal Outcome Evaluation:      Plan of Care Reviewed With: patient    Overall Patient Progress: improvingOverall Patient Progress: improving    Outcome Evaluation: AOx4, VSS, states pain is improving 7/10 with flexeril and valium. OT and PT consult      Problem: Adult Inpatient Plan of Care  Goal: Plan of Care Review  Description: The Plan of Care Review/Shift note should be completed every shift.  The Outcome Evaluation is a brief statement about your assessment that the patient is improving, declining, or no change.  This information will be displayed automatically on your shiftnote.  Outcome: Progressing  Flowsheets (Taken 4/26/2025 1014)  Outcome Evaluation: AOx4, VSS, states pain is improving 7/10 with flexeril and valium. OT and PT consult  Plan of Care Reviewed With: patient  Overall Patient Progress: improving  Goal: Patient-Specific Goal (Individualized)  Description: You can add care plan individualizations to a care plan. Examples of Individualization might be:  \"Parent requests to be called daily at 9am for status\", \"I have a hard time hearing out of my right ear\", or \"Do not touch me to wake me up as it startlesme\".  Outcome: Progressing  Goal: Absence of Hospital-Acquired Illness or Injury  Outcome: Progressing  Intervention: Identify and Manage Fall Risk  Recent Flowsheet Documentation  Taken 4/26/2025 0941 by Kinsey Ferrer RN  Safety Promotion/Fall Prevention: safety round/check completed  Intervention: Prevent Skin Injury  Recent Flowsheet Documentation  Taken 4/26/2025 0941 by Kinsey Ferrer RN  Body Position: " position changed independently  Skin Protection: adhesive use limited  Intervention: Prevent and Manage VTE (Venous Thromboembolism) Risk  Recent Flowsheet Documentation  Taken 4/26/2025 0941 by Kinsey Ferrer RN  VTE Prevention/Management: SCDs off (sequential compression devices)  Intervention: Prevent Infection  Recent Flowsheet Documentation  Taken 4/26/2025 0941 by Kinsey Ferrer RN  Infection Prevention:   rest/sleep promoted   hand hygiene promoted   cohorting utilized  Goal: Optimal Comfort and Wellbeing  Outcome: Progressing  Intervention: Monitor Pain and Promote Comfort  Recent Flowsheet Documentation  Taken 4/26/2025 0941 by Kinsey Ferrer RN  Pain Management Interventions: medication (see MAR)  Goal: Readiness for Transition of Care  Outcome: Progressing     Problem: Delirium  Goal: Optimal Coping  Outcome: Progressing  Goal: Improved Behavioral Control  Outcome: Progressing  Intervention: Minimize Safety Risk  Recent Flowsheet Documentation  Taken 4/26/2025 0941 by Kinsey Ferrer RN  Enhanced Safety Measures:   review medications for side effects with activity   pain management  Goal: Improved Attention and Thought Clarity  Outcome: Progressing  Goal: Improved Sleep  Outcome: Progressing     Problem: Pain Acute  Goal: Optimal Pain Control and Function  Outcome: Progressing  Intervention: Develop Pain Management Plan  Recent Flowsheet Documentation  Taken 4/26/2025 0941 by Kinsey Ferrer RN  Pain Management Interventions: medication (see MAR)  Intervention: Prevent or Manage Pain  Recent Flowsheet Documentation  Taken 4/26/2025 0941 by Kinsey Ferrer RN  Medication Review/Management: medications reviewed     Problem: UTI (Urinary Tract Infection)  Goal: Improved Infection Symptoms  Outcome: Progressing

## 2025-04-26 NOTE — PROGRESS NOTES
Care Management Discharge Note    Discharge Date: 04/25/2025       Discharge Disposition:  Home Care    Discharge Services:      Discharge DME:      Discharge Transportation: agency    Private pay costs discussed: Not applicable    Does the patient's insurance plan have a 3 day qualifying hospital stay waiver?  No    PAS Confirmation Code:    Patient/family educated on Medicare website which has current facility and service quality ratings:      Education Provided on the Discharge Plan:    Persons Notified of Discharge Plans: patient  Patient/Family in Agreement with the Plan:      Handoff Referral Completed: No, handoff not indicated or clinically appropriate    Additional Information:  Met with patient, PT eval today and recommending home PT.  Patient has a RN from [facility], referral and orders sent to [facility] for home PT.  Patient aware and agreeable.    FRANSISCO Minaya  [phone]

## 2025-04-26 NOTE — PLAN OF CARE
"Goal Outcome Evaluation:    Care from [id number]    Inpatient Progress Note:  For complete assessment see flow sheet documentation.    /62 (BP Location: Left arm)   Pulse 61   Temp 97.6  F (36.4  C) (Oral)   Resp 20   Wt 104.7 kg (230 lb 13.2 oz)   LMP  (LMP Unknown)   SpO2 93%   BMI 32.19 kg/m     Alert & oriented x 5, Assist x 1 with walker. No BG's checks/. On Mod carb diet. Neurology signed odd, GI consulted      Problem: Adult Inpatient Plan of Care  Goal: Plan of Care Review  Description: The Plan of Care Review/Shift note should be completed every shift.  The Outcome Evaluation is a brief statement about your assessment that the patient is improving, declining, or no change.  This information will be displayed automatically on your shiftnote.  Outcome: Progressing  Flowsheets (Taken 4/26/2025 0530)  Overall Patient Progress: improving  Goal: Patient-Specific Goal (Individualized)  Description: You can add care plan individualizations to a care plan. Examples of Individualization might be:  \"Parent requests to be called daily at 9am for status\", \"I have a hard time hearing out of my right ear\", or \"Do not touch me to wake me up as it startlesme\".  Outcome: Progressing  Goal: Absence of Hospital-Acquired Illness or Injury  Outcome: Progressing  Intervention: Identify and Manage Fall Risk  Recent Flowsheet Documentation  Taken 4/25/2025 2011 by Azra Lloyd, RN  Safety Promotion/Fall Prevention: safety round/check completed  Intervention: Prevent Skin Injury  Recent Flowsheet Documentation  Taken 4/25/2025 2011 by Azra Lloyd, RN  Body Position: position changed independently  Skin Protection: adhesive use limited  Intervention: Prevent and Manage VTE (Venous Thromboembolism) Risk  Recent Flowsheet Documentation  Taken 4/25/2025 2011 by Azra Lloyd, RN  VTE Prevention/Management: SCDs off (sequential compression devices)  Intervention: Prevent Infection  Recent Flowsheet " Documentation  Taken 4/25/2025 2011 by Azra Lloyd RN  Infection Prevention:   rest/sleep promoted   hand hygiene promoted   cohorting utilized  Goal: Optimal Comfort and Wellbeing  Outcome: Progressing  Intervention: Monitor Pain and Promote Comfort  Recent Flowsheet Documentation  Taken 4/25/2025 2011 by Azra Lloyd RN  Pain Management Interventions: medication (see MAR)  Goal: Readiness for Transition of Care  Outcome: Progressing  Flowsheets (Taken 4/26/2025 0551)  Anticipated Changes Related to Illness: none  Transportation Anticipated: agency  Concerns to be Addressed: discharge planning  Intervention: Mutually Develop Transition Plan  Recent Flowsheet Documentation  Taken 4/26/2025 0551 by Azra Lloyd RN  Anticipated Changes Related to Illness: none  Transportation Anticipated: agency  Concerns to be Addressed: discharge planning     Problem: Delirium  Goal: Optimal Coping  Outcome: Progressing  Goal: Improved Behavioral Control  Outcome: Progressing  Intervention: Minimize Safety Risk  Recent Flowsheet Documentation  Taken 4/25/2025 2011 by Azra Lloyd RN  Enhanced Safety Measures:   review medications for side effects with activity   pain management  Goal: Improved Attention and Thought Clarity  Outcome: Progressing  Goal: Improved Sleep  Outcome: Progressing     Problem: Pain Acute  Goal: Optimal Pain Control and Function  Outcome: Progressing  Intervention: Develop Pain Management Plan  Recent Flowsheet Documentation  Taken 4/25/2025 2011 by Azra Lloyd RN  Pain Management Interventions: medication (see MAR)  Intervention: Prevent or Manage Pain  Recent Flowsheet Documentation  Taken 4/25/2025 2011 by Azra Lloyd RN  Medication Review/Management: medications reviewed     Problem: UTI (Urinary Tract Infection)  Goal: Improved Infection Symptoms  Outcome: Progressing            Overall Patient Progress: improvingOverall Patient Progress: improving

## 2025-04-26 NOTE — PLAN OF CARE
Patient's After Visit Summary was reviewed with patient .  Patient verbalized understanding of After Visit Summary, recommended follow up and was given an opportunity to ask questions.   Discharge medications sent home with patient/family: YES, flexeril and valium   Discharged with spouse

## 2025-04-26 NOTE — PROGRESS NOTES
04/26/25 1523   Appointment Info   Signing Clinician's Name / Credentials (PT) Zeke Pennington DPT   Living Environment   Living Environment Comments Pt reports living in ILF by self. Reports use of walker inside of apartment, motorized scooter for longer distances outside of apt.   Self-Care   Usual Activity Tolerance moderate   Current Activity Tolerance moderate   Equipment Currently Used at Home walker, rolling;other (see comments);grab bar, tub/shower;shower chair   General Information   Onset of Illness/Injury or Date of Surgery 04/23/25   Referring Physician Shai Lovell MD   Patient/Family Therapy Goals Statement (PT) To improve neck pain, mobility   Pertinent History of Current Problem (include personal factors and/or comorbidities that impact the POC) Per H&P, pt is an 84 year old female   Cognition   Affect/Mental Status (Cognition) WFL   Orientation Status (Cognition) oriented x 4   Follows Commands (Cognition) WFL   Pain Assessment   Patient Currently in Pain Yes, see Vital Sign flowsheet  (neck pain)   Posture    Posture Forward head position;Protracted shoulders   Range of Motion (ROM)   ROM Comment slight decreased cervical ROM 2/2 pain   Strength (Manual Muscle Testing)   Strength Comments B LE WFL; decreased activity tolerance   Bed Mobility   Comment, (Bed Mobility) ind supine <> sit   Transfers   Comment, (Transfers) SBA sit < > stand with 4ww   Gait/Stairs (Locomotion)   Distance in Feet (Gait) 20   Pattern (Gait) step-through   Deviations/Abnormal Patterns (Gait) base of support, wide;gait speed decreased;stride length decreased   Comment, (Gait/Stairs) SBA with 4ww   Balance   Balance Comments good sitting; good- standing with 4ww   Sensory Examination   Sensory Perception Comments reports some abnormal sensation R fingers 3-5   Coordination   Coordination no deficits were identified   Clinical Impression   Criteria for Skilled Therapeutic Intervention Yes, treatment indicated    PT Diagnosis (PT) decreased functional mobility   Influenced by the following impairments decreased cervical ROM, pain, decreased balance/activity tolerance   Functional limitations due to impairments decreased transfers, ambulation   Clinical Presentation (PT Evaluation Complexity) stable   Clinical Presentation Rationale Pt medically stable, functional status   Clinical Decision Making (Complexity) low complexity   Planned Therapy Interventions (PT) balance training;cryotherapy;bed mobility training;gait training;home exercise program;neuromuscular re-education;patient/family education;postural re-education;ROM (range of motion);strengthening;stretching;transfer training;progressive activity/exercise   Risk & Benefits of therapy have been explained evaluation/treatment results reviewed;care plan/treatment goals reviewed;risks/benefits reviewed;current/potential barriers reviewed;participants voiced agreement with care plan;participants included;patient   PT Total Evaluation Time   PT Arm[place] Low Complexity Minutes (12929) 16   Physical Therapy Goals   PT Frequency Daily   PT Predicted Duration/Target Date for Goal Attainment 04/27/25   PT Goals Transfers;Gait;PT Goal 1   PT: Transfers Modified independent;Sit to/from stand;Assistive device   PT: Gait Modified independent;Assistive device;100 feet   PT: Goal 1 Pt to demo ind with cervical stretching to inc ROM and improve ability to perform functional activity UEs   PT Discharge Planning   PT Plan review exercises; progress ind   PT Discharge Recommendation (DC Rec) home with home care physical therapy   PT Rationale for DC Rec Pt able to demonstrate safe mobility to return home with use of walker for stability. Pt would benefit from continued HHPT to help progress ind/activity tolerance and manage acute flare of neck pain.   PT Brief overview of current status SBA with 4ww   PT Total Distance Amb During Session (feet) 200   Physical Therapy Time and Intention    Timed Code Treatment Minutes 52   Total Session Time (sum of timed and untimed services) 68

## 2025-04-27 NOTE — PLAN OF CARE
Physical Therapy Discharge Summary    Reason for therapy discharge:    Discharged to home with home therapy.    Progress towards therapy goal(s). See goals on Care Plan in [facility] electronic health record for goal details.  Goals not met.  Barriers to achieving goals:   discharge from facility.    Therapy recommendation(s):    Continued therapy is recommended.  Rationale/Recommendations:  Pt able to demonstrate safe mobility to return home with use of walker for stability. Pt would benefit from continued HHPT to help progress ind/activity tolerance and manage acute flare of neck pain.

## 2025-04-28 ENCOUNTER — PATIENT OUTREACH (OUTPATIENT)
Dept: ONCOLOGY | Facility: CLINIC | Age: 85
End: 2025-04-28
Payer: COMMERCIAL

## 2025-04-28 ENCOUNTER — PATIENT OUTREACH (OUTPATIENT)
Dept: CARE COORDINATION | Facility: CLINIC | Age: 85
End: 2025-04-28
Payer: COMMERCIAL

## 2025-04-28 NOTE — PROGRESS NOTES
[facility]: Cancer Care Follow-Up Note                                    Discussion with Patient:                                                    [facility]  [place] stated  she is doing better. Still not 100%, but on the mend.  She will be getting in home physical therapy.           Dates of Treatment:                                                      Infusion given in last 28 days       None            Assessment:                                                          [facility]: Transitions of Care Note  Chief Complaint   Patient presents with    Clinic Care Coordination - Follow-up     Post Hospital follow up           Transitions of Care Assessment    Discharge Assessment  How are you doing now that you are home?: I'm doing much better!  I can move my head to the side where I wasn't able to do that before  How are your symptoms? (Red Flag symptoms escalate to triage hotline per guidelines): Improved  Do you know how to contact your clinic care team if you have future questions or changes to your health status? : Yes  Does the patient have their discharge instructions? : Yes  Does the patient have questions regarding their discharge instructions? : No  Were you started on any new medications or were there changes to any of your previous medications? : Yes  Does the patient have all of their medications?: Yes  Do you have questions regarding any of your medications? : No  Do you have all of your needed medical supplies or equipment (DME)?  (i.e. oxygen tank, CPAP, cane, etc.): Yes        Follow up Plan     Discharge Follow-Up  Discharge follow up appointment scheduled in alignment with recommended follow up timeframe or Transitions of Risk Category? (Low = within 30 days; Moderate= within 14 days; High= within 7 days): Yes    Future Appointments   Date Time Provider Department Center   4/30/2025  2:00 PM [facility] [place]  [place]   5/9/2025 12:00 AM [facility] TECH1 [facility] CLIN   5/12/2025 12:00 PM  [facility] LAB [facility] [place] [facility]   5/12/2025 12:30 PM Patsy Pompa MD [facility] RID       Outpatient Plan as outlined on AVS reviewed with patient.    For any urgent concerns, please contact our 24 hour clinic line:   [place]: [phone]       Sheree Herrera RN      Intervention/Education provided during outreach:                                                         Patient to follow up as scheduled at next appt  Patient to call/MyChart message with updates    Signature:  Sheree Herrera RN

## 2025-04-28 NOTE — PROGRESS NOTES
Clinic Care Coordination Contact  Transitions of Care Outreach  Chief Complaint   Patient presents with    Clinic Care Coordination - Post Hospital     Most Recent Admission Date: 4/23/2025   Most Recent Admission Diagnosis: Acute intractable headache, unspecified headache type - R51.9     Most Recent Discharge Date: 4/26/2025   Most Recent Discharge Diagnosis: Acute intractable headache, unspecified headache type - R51.9  Urinary tract infection without hematuria, site unspecified - N39.0     Transitions of Care Assessment    Discharge Assessment  How are you doing now that you are home?: She stated she is in terrible pain right now. She told them what medications work for her headaches but they didn't give her those medications. She stated she is not following with [facility] any more due to PA at clinic ordering a medication she is allergic to. She stated she is so sad that that happened.  How are your symptoms? (Red Flag symptoms escalate to triage hotline per guidelines): Improved  Do you know how to contact your clinic care team if you have future questions or changes to your health status? : Yes  Does the patient have their discharge instructions? : Yes  Does the patient have questions regarding their discharge instructions? : No  Were you started on any new medications or were there changes to any of your previous medications? : Yes  Does the patient have all of their medications?: Yes  Do you have questions regarding any of your medications? : No  Do you have all of your needed medical supplies or equipment (DME)?  (i.e. oxygen tank, CPAP, cane, etc.): Yes    Post-op ([facility] CTA Only)  If the patient had a surgery or procedure, do they have any questions for a nurse?: No    Post-op (Clinicians Only)  Did the patient have surgery or a procedure: No  Fever: No  Chills: No  Eating & Drinking: unable to tolerate solid foods (Due to pain.)  PO Intake: regular diet  Additional Symptoms: decreased  appetite  Bowel Function: constipation (What usually helps her is licorice. Her  bought her some. Her family plans to buy her something more.)  Date of last BM: 04/26/25  Urinary Status: voiding without complaint/concerns (She feels like she still has UTI.)    Follow up Plan     Discharge Follow-Up  Discharge follow up appointment scheduled in alignment with recommended follow up timeframe or Transitions of Risk Category? (Low = within 30 days; Moderate= within 14 days; High= within 7 days): No  Patient's follow up appointment not scheduled: Patient declined scheduling support. Education on the importance of transitions of care follow up. Provided scheduling phone number.    RN CC contacted patient for post-hospital follow up and to introduce Care Coordination. Full assessment not indicated as patient declined to have Care Coordination support at this time. She was scheduled for enrollment on Wednesday, 4/30/25 but she's changing clinics. She is changing to the [facility] in [place] due to a medication error by her Primary Care Provider at the [facility]. RN CC provided her with the clinic number and contact number for the RN CC at that clinic.     Future Appointments   Date Time Provider Department Center   [facility]   5/12/2025 12:00 PM [facility] LAB [facility] RID   5/12/2025 12:30 PM Patsy Pompa MD [facility]     Outpatient Plan as outlined on AVS reviewed with patient.    For any urgent concerns, please contact our 24 hour nurse triage line: [phone] ([phone])       Jen Gramajo RN, BSN, CPHN, [facility] Ambulatory Care Management  [facility], and [facility]  [email or web address]  Office: [phone]  Employed by [employer]                   Clinic Care Coordination Contact  [facility]    Clinical Data: Care Coordinator Outreach    Outreach Documentation  Number of Outreach Attempt   4/28/2025   1:21 PM 1     Left message on patient's voicemail with call back information and requested return call.    Plan: Care Coordinator will try to reach patient again in 1-2 business days.    Jen Gramajo RN, BSN, CPHN, [facility] Ambulatory Care Management  [facility], and [facility]  [email or web address]  Office: [phone]  Employed by [employer]

## 2025-04-29 ENCOUNTER — PATIENT OUTREACH (OUTPATIENT)
Dept: CARE COORDINATION | Facility: CLINIC | Age: 85
End: 2025-04-29

## 2025-04-29 ENCOUNTER — TELEPHONE (OUTPATIENT)
Dept: CARE COORDINATION | Facility: CLINIC | Age: 85
End: 2025-04-29

## 2025-04-29 ASSESSMENT — ACTIVITIES OF DAILY LIVING (ADL): DEPENDENT_IADLS:: TRANSPORTATION;MEDICATION MANAGEMENT;CLEANING

## 2025-04-29 NOTE — TELEPHONE ENCOUNTER
Please call patient to assist with scheduling provider hospital discharge follow up appointment.     Thank you,     Crystal Lin, RN Care Coordinator  [facility] [place]  Email: [email or web address]  Phone: [phone]

## 2025-04-29 NOTE — LETTER
[facility]  Patient Centered Plan of Care  About Me:      Patient Name:  Savanna Rehman    YOB: 1940  Age:         84 year old   [place] MRN:    [id number] Telephone Information:  Home Phone [phone]   Mobile [phone]       Address:  [address]  [place] [address] Email address:  [email or web address]      Emergency Contact(s)    Name Relationship Lgl Grd Work Phone Home Phone Mobile Phone   Esa JAIN Daughter No   [phone]           Primary language:  English     needed? No   [place] [employer]:  [phone] op. 1  Other communication barriers:Glasses; Utilization    Preferred Method of Communication:  Phone  Current living arrangement: I live alone    Mobility Status/ Medical Equipment: Independent w/Device    Health Maintenance  Health Maintenance Reviewed: Due/Overdue   Health Maintenance Due   Topic Date Due    HF ACTION PLAN  Never done    PARATHYROID  Never done    DEPRESSION ACTION PLAN  Never done    HEPATITIS A IMMUNIZATION (1 of 2 - Risk 2-dose series) Never done    ZOSTER IMMUNIZATION (1 of 2) Never done    HEPATITIS B IMMUNIZATION (1 of 3 - Risk 3-dose series) Never done    RSV VACCINE (1 - 1-dose 75+ series) Never done    MEDICARE ANNUAL WELLNESS VISIT  11/30/2021    DIABETIC FOOT EXAM  11/30/2021    EYE EXAM  05/26/2024    INFLUENZA VACCINE (1) 09/01/2024    COVID-19 Vaccine (5 - 2024-25 season) 09/01/2024    MICROALBUMIN  09/22/2024    LIPID  10/19/2024      My Access Plan  Medical Emergency 911   Primary Clinic Line [facility] - [phone]   24 Hour Appointment Line [phone] or  [phone]) (toll-free)   24 Hour Nurse Line [phone] (toll-free)   Preferred Urgent Care Other ([facility])     Preferred Hospital [facility]  [phone]     Preferred Pharmacy [facility] - [place], [place] - [address]  [place]     Behavioral Health Crisis Line The National Suicide Prevention Lifeline at 1-960.283.7455 or Text/Call 988     My Care Team Members  Patient Care Team         Relationship Specialty Notifications Start Roya Marrero APRN CNP PCP - General Family Medicine  4/29/25     Phone: [phone] Fax: [phone] [address] [facility] [address]    Patti Suárez MD Referring Physician Internal Medicine  6/30/20     Phone: [phone] Pager: Use Vocera Fax: [phone]        [address] [facility] [address]    Deisy Wilson MD MD Urology  6/30/20     Phone: [phone] Fax: [phone]         [address] [place] [address]    Adelaida Liu, RN Specialty Care Coordinator Urology  6/30/20     Phone: [phone] Pager: [phone]        Allison Mack MD MD Internal Medicine  2/20/23     Phone: [phone] Fax: [phone]         [address] [place] [address]    Sowmya Pollard APRN CNP Nurse Practitioner Dermatology  1/29/24     Phone: [phone] Fax: [phone]         [address] [facility] [address]    Carlos Youngblood MD  Internal Medicine  3/7/24     Phone: [phone] Fax: [phone] [address] [facility] [address]    Aniyah Waller DO Physician Cardiovascular Disease  3/13/24     Phone: [phone] Fax: [phone] [address] [facility] [address]    Carlos Youngblood MD  Internal Medicine  4/4/24     Phone: [phone] Fax: [phone] [address] [facility] [address]    Taylor Payan MD Assigned PCP   4/23/24     Phone: [phone] Fax: [phone] [address] [facility] [address]    Aniyah Waller DO Assigned Heart and Vascular Provider   6/23/24     Phone: [phone] Fax: [phone] [address] [place] [address]     Aniyah Waller DO Physician Cardiovascular Disease  7/9/24     Phone: [phone] Fax: [phone] [address] [facility] [address] [facility] [address]    Patsy Pompa MD Assigned Cancer Care Provider   9/23/24     Phone: [phone] Pager: YAHIR ROSSI Fax: [phone] [address] [facility], [address] [address]    Sheree Herrera, RN Specialty Care Coordinator Hematology & Oncology Admissions 12/2/24     Mariam Lowery PA-C Physician Assistant   2/3/25     Phone: [phone] Fax: [phone]         [address] [address]    Olu Wilson MD MD Neurology  2/28/25     Phone: [phone] Fax: [phone]         [address] [facility] [address]    Sowmya Pollard, APRN CNP Assigned Dermatology Provider   3/23/25     Phone: [phone] Fax: [phone] [address] [facility] [address]    Jen Gramajo, CLIFFORD Lead Care Coordinator Primary Care - CC Admissions 4/14/25 4/29/25    Phone: [phone]         Crystal Lin RN Lead Care Coordinator  Admissions 4/29/25     Phone: [phone]         Adrianna Whiting SIDRA Community Health Worker Primary Care - CC Admissions 4/29/25     Phone: [phone]                   My Care Plans  Self Management and Treatment Plan    Care Plan  Care Plan: Utilization       Problem: Increased risk of re-admission       Long-Range Goal: I would like additional resources and support to manage my health and prevent future avoidable ED visits/hospital admissions       Start Date: 4/29/2025 Expected End Date: 2/27/2026    Note:     Barriers: diagnosis of multiple, chronic, complex medical conditions, provider availability - wait time to complete appointments, etc.   Strengths: motivated, engaged in care coordination, participates in home care therapies   Patient expressed understanding of goal: Yes   Action steps to achieve this goal:  1. I will follow up with my providers as scheduled/recommended:   Primary  Care Provider: NABEEL  Cardiology: #[phone] TBFRANCO  Neurology: 05/05/2025  Oncology: 05/12/2025  2. I will discuss, review, schedule & complete recommended overdue health maintenance with my Primary Care Provider.   3. I will take my medications as prescribed.   4. I will contact my care team with questions, concerns, support needs. I will use the clinic as a resource and I understand I can contact my clinic with 24/7 after hours services available. Care Coordinator will remain available as needed.                               Action Plans on File:                       Advance Care Plans/Directives:   Advanced Care Plan/Directives on file: Yes    Status of Document(s): On File and Validated    Advanced Care Plan/Directives Type: POLST       My Medical and Care Information  Problem List   Patient Active Problem List   Diagnosis    Angioedema    CRISTIANA (obstructive sleep apnea)     Recurrent UTI    Urgency-frequency syndrome    Chronic atrial fibrillation (H)    Permanent atrial fibrillation (H)    Hyperlipidemia LDL goal <100    Vitamin D deficiency    Type 2 diabetes mellitus with stage 3a chronic kidney disease, without long-term current use of insulin (H)    Ascending aorta dilatation    Nonrheumatic tricuspid valve regurgitation    Gastroesophageal reflux disease    Stage 3b chronic kidney disease (H)    Adjustment disorder with anxiety    Adjustment disorder with mixed anxiety and depressed mood    Abnormal feces    Diverticular disease of colon    Loose stools    Lower abdominal pain    Elevated troponin    Repeated falls    Dizziness and giddiness    Physical deconditioning    Obstructive sleep apnea (adult) (pediatric)    Gastro-esophageal reflux disease without esophagitis    History of Clostridium difficile colitis    Muscle weakness (generalized)    Hypertensive heart disease with chronic diastolic congestive heart failure (H)    Abnormal liver enzymes    Cirrhosis of liver (H)    Acute encephalopathy     Hypervolemia, unspecified hypervolemia type    Congestive heart failure, unspecified HF chronicity, unspecified heart failure type (H)    Atrial fibrillation with slow ventricular response (H)    S/P placement of cardiac pacemaker: 4/5/2024    Cognitive impairment    Adjustment disorder with anxious mood    Urinary incontinence, unspecified type    Thrombocytopenia    Moderate episode of recurrent major depressive disorder (H)    Ischemic optic neuropathy of right eye    Acute intractable headache, unspecified headache type      Current Medications:  We reviewed your medications today. It will be important to review this with your provider at your next clinic visit.    Care Coordination Start Date: 4/29/2025   Frequency of Care Coordination: monthly, more frequently as needed     Form Last Updated: 04/29/2025

## 2025-04-29 NOTE — LETTER
April 29, 2025      SAVANNA SANDERSON  [address]   [place] [address]      Dear Savanna:    Baron, my name is Crystal Lin. You are eligible for [facility] Case Management program through your enrollment in [employer]. We think you may benefit from this program. I am writing to invite you to be in our Case Management program.     The following are a few things the Case Management program can help you with:  Select or change your primary care doctor or primary care clinic  Find a specialist, if needed, near your home  Receive preventive care, such as flu shots  Join programs offered by [facility] that interest you, like wellness programs    As your , I will do the following to enroll you in the Case Management Program:  Schedule a telephone call with you to answer any questions you may have about case management  Conduct an assessment by phone to identify needs case management can help you with  Develop a care plan to address those needs  Help you obtain available care and resources as needed    I will call you soon to discuss your interest in this program and your health care needs.    Being in the Case Management program is voluntary and offered to you at no cost. If you accept being in the Case Management program, you can stop any time by calling me at [phone].    Sincerely,    Crystal Lin, RN Care Coordinator  [facility] [place]  Email: [email or web address]  Phone: [phone]           [address] [place] [address]  [phone]  fax [phone]  [facility]    [id number]                                   (01/2020)

## 2025-04-29 NOTE — LETTER
[facility] CARE COORDINATION  [address]  [facility] [address]     April 29, 2025    Savanna Rehman  [address]   [facility] [place] [facility] [address]      Dear Savanna,    I am a clinic care coordinator who works with BATSHEVA Joel CNP with the [facility]. I wanted to thank you for spending the time to talk with me.  Below is a description of clinic care coordination and how I can further assist you.       The clinic care coordination team is made up of a registered nurse, , financial resource worker and community health worker who understand the health care system. The goal of clinic care coordination is to help you manage your health and improve access to the health care system. Our team works alongside your provider to assist you in determining your health and social needs. We can help you obtain health care and community resources, providing you with necessary information and education. We can work with you through any barriers and develop a care plan that helps coordinate and strengthen the communication between you and your care team.  Our services are voluntary and are offered without charge to you personally.    Please feel free to contact me with any questions or concerns regarding care coordination and what we can offer.      We are focused on providing you with the highest-quality healthcare experience possible.    Sincerely,     Crystal Lin RN Care Coordinator  [facility] [place]  Email: [email or web address]  Phone: [phone]     Additional Information: I have enclosed a copy of the Patient Centered Plan of Care. This has helpful information and goals that we have talked about. Please keep this in an easy to access place to use as needed.  I have enclosed a letter specific to your [facility] eligibility for Case Management services.

## 2025-04-29 NOTE — PROGRESS NOTES
Clinic Care Coordination Contact  OUTREACH    Referral Information:  Referral Source: Self-patient/Caregiver  Primary Diagnosis: Neurological Disorders    Chief Complaint   Patient presents with    Clinic Care Coordination - Initial      Universal Utilization: 97.9% Risk of Admission or ED Visit   Clinic Utilization  Difficulty keeping appointments:: No  Compliance Concerns: No  No-Show Concerns: No  No PCP office visit in Past Year: No  Utilization      No Show Count (past year)  8             ED Visits  7             Hospital Admissions  4                    Current as of: 4/29/2025 10:37 AM              Clinical Concerns:  Current Medical Concerns:    Patient Active Problem List   Diagnosis    Angioedema    CRISTIANA (obstructive sleep apnea)     Recurrent UTI    Urgency-frequency syndrome    Chronic atrial fibrillation (H)    Permanent atrial fibrillation (H)    Hyperlipidemia LDL goal <100    Vitamin D deficiency    Type 2 diabetes mellitus with stage 3a chronic kidney disease, without long-term current use of insulin (H)    Ascending aorta dilatation    Nonrheumatic tricuspid valve regurgitation    Gastroesophageal reflux disease    Stage 3b chronic kidney disease (H)    Adjustment disorder with anxiety    Adjustment disorder with mixed anxiety and depressed mood    Abnormal feces    Diverticular disease of colon    Loose stools    Lower abdominal pain    Elevated troponin    Repeated falls    Dizziness and giddiness    Physical deconditioning    Obstructive sleep apnea (adult) (pediatric)    Gastro-esophageal reflux disease without esophagitis    History of Clostridium difficile colitis    Muscle weakness (generalized)    Hypertensive heart disease with chronic diastolic congestive heart failure (H)    Abnormal liver enzymes    Cirrhosis of liver (H)    Acute encephalopathy    Hypervolemia, unspecified hypervolemia type    Congestive heart failure, unspecified HF chronicity, unspecified heart failure type (H)     Atrial fibrillation with slow ventricular response (H)    S/P placement of cardiac pacemaker: 4/5/2024    Cognitive impairment    Adjustment disorder with anxious mood    Urinary incontinence, unspecified type    Thrombocytopenia    Moderate episode of recurrent major depressive disorder (H)    Ischemic optic neuropathy of right eye    Acute intractable headache, unspecified headache type      Has a pinched nerve, headache.   Needs to schedule an appointment to establish care with Primary Care Provider by the end of next month so she can continue receiving home care services. Patient verbalizes preferred Primary Care Provider is Roya Garza at [facility].     Raj is her home care nurse through [facility] #[phone] who comes every Tuesday at noon to assist with setting up all of her medications. She states that she is supposed to be starting home care physical therapy soon.   She states that she has a cream for the back of her neck which is helpful. Shares that she is hesitant to use this because she states that she doesn't want to stain her new recliner. She is hopeful that her home health nurse can assist her to put cover over area so she doesn't ruin her new recliner.     Patient then shares that she has many allergies. She asks RNCC what the name of a pain medication is that starts with the letter R.     Current Behavioral Concerns: none noted.     Education Provided to patient: Care Coordination role, clinic after hours, medications, appointments discussed/reviewed. Support provided.    Pain  Pain (GOAL):: Yes  Type: Acute (<3mo)  Location of chronic pain:: Headache due to pinched nerve  Radiating: Yes  Location pain radiates to: down to her back  Progression: Unchanged  Description of pain: Burning  Alleviating Factors: Rest, Pain Medication  Aggravating Factors: Activity  Health Maintenance Reviewed: Due/Overdue   Health Maintenance Due   Topic Date Due    HF  ACTION PLAN  Never done    PARATHYROID  Never done    DEPRESSION ACTION PLAN  Never done    HEPATITIS A IMMUNIZATION (1 of 2 - Risk 2-dose series) Never done    ZOSTER IMMUNIZATION (1 of 2) Never done    HEPATITIS B IMMUNIZATION (1 of 3 - Risk 3-dose series) Never done    RSV VACCINE (1 - 1-dose 75+ series) Never done    MEDICARE ANNUAL WELLNESS VISIT  11/30/2021    DIABETIC FOOT EXAM  11/30/2021    EYE EXAM  05/26/2024    INFLUENZA VACCINE (1) 09/01/2024    COVID-19 Vaccine (5 - 2024-25 season) 09/01/2024    MICROALBUMIN  09/22/2024    LIPID  10/19/2024      Clinical Pathway: None    Medication Management:  Medication review status: Medications reviewed and no changes reported per patient.           Functional Status:  Dependent ADLs:: Bathing  Dependent IADLs:: Transportation, Medication Management, Cleaning  Bed or wheelchair confined:: No  Mobility Status: Independent w/Device  Fallen 2 or more times in the past year?: No (Patient denies any falls in the year 2025. Does not recall how long ago her last fall was.)  Any fall with injury in the past year?: No    Living Situation:  Current living arrangement:: I live alone  Type of residence:: [facility] Senior Living    Lifestyle & Psychosocial Needs:    Social Drivers of Health     Food Insecurity: Low Risk  (4/23/2025)    Food Insecurity     Within the past 12 months, did you worry that your food would run out before you got money to buy more?: No     Within the past 12 months, did the food you bought just not last and you didn t have money to get more?: No   Depression: Not at risk (2/6/2025)    PHQ-2     PHQ-2 Score: 0   Housing Stability: Low Risk  (4/23/2025)    Housing Stability     Do you have housing? : Yes     Are you worried about losing your housing?: No   Tobacco Use: Medium Risk (4/22/2025)    Patient History     Smoking Tobacco Use: Former     Smokeless Tobacco Use: Never     Passive Exposure: Past   Financial Resource Strain: Low Risk  (4/23/2025)     Financial Resource Strain     Within the past 12 months, have you or your family members you live with been unable to get utilities (heat, electricity) when it was really needed?: No   Alcohol Use: Not At Risk (1/27/2022)    AUDIT-C     Frequency of Alcohol Consumption: Monthly or less     Average Number of Drinks: 1 or 2     Frequency of Binge Drinking: Never   Transportation Needs: Low Risk  (4/23/2025)    Transportation Needs     Within the past 12 months, has lack of transportation kept you from medical appointments, getting your medicines, non-medical meetings or appointments, work, or from getting things that you need?: No   Physical Activity: Not on file   Interpersonal Safety: Low Risk  (4/24/2025)    Interpersonal Safety     Do you feel physically and emotionally safe where you currently live?: Yes     Within the past 12 months, have you been hit, slapped, kicked or otherwise physically hurt by someone?: No     Within the past 12 months, have you been humiliated or emotionally abused in other ways by your partner or ex-partner?: No   Stress: Stress Concern Present (1/27/2022)    [facility] Transfer of Occupational Health - Occupational Stress Questionnaire     Feeling of Stress : Very much   Social Connections: Unknown (1/3/2024)    Received from [employer], [employer] [facility]    Social Connections     Frequency of Communication with Friends and Family: Not on file   Health Literacy: Not on file     Diet:: Diabetic diet  Inadequate nutrition (GOAL):: No  Tube Feeding: No  Inadequate activity/exercise (GOAL):: No  Significant changes in sleep pattern (GOAL): No  Transportation means:: [employer] mobility, Family  Bahai or spiritual beliefs that impact treatment:: No  Mental health DX:: Yes  Mental health DX how managed:: None  Mental health management concern (GOAL):: No  Chemical Dependency Status: No Current Concerns  Chemical Dependency  Management:  (NA)  Informal Support system:: Children      Resources and Interventions:  Current Resources:   Skilled Home Care Services: Skilled Nursing, Physical Therapy, Home Health Aid  Community Resources: County Programs, , Transportation Services, Home Care, Lifeline, DME  Supplies Currently Used at Home: Incontinence Supplies, Diabetic Supplies, Compression Stockings  Equipment Currently Used at Home: walker, standard, wheelchair, power, glucometer  Employment Status: retired  Advance Care Plan/Directive  Advanced Care Plans/Directives on file:: Yes  Status of record:: On File and Validated  Type Advanced Care Plans/Directives: POLST    Referrals Placed: None     Care Plan:  Care Plan: Utilization       Problem: Increased risk of re-admission       Long-Range Goal: I would like additional resources and support to manage my health and prevent future avoidable ED visits/hospital admissions       Start Date: 4/29/2025 Expected End Date: 2/27/2026    Note:     Barriers: diagnosis of multiple, chronic, complex medical conditions, provider availability - wait time to complete appointments, etc.   Strengths: motivated, engaged in care coordination, participates in home care therapies   Patient expressed understanding of goal: Yes   Action steps to achieve this goal:  1. I will follow up with my providers as scheduled/recommended:   Primary Care Provider: NABEEL  Cardiology: #[phone] NABEEL  Neurology: 05/05/2025  Oncology: 05/12/2025  2. I will discuss, review, schedule & complete recommended overdue health maintenance with my Primary Care Provider.   3. I will take my medications as prescribed.   4. I will contact my care team with questions, concerns, support needs. I will use the clinic as a resource and I understand I can contact my clinic with 24/7 after hours services available. Care Coordinator will remain available as needed.                               Patient/Caregiver understanding:  Patient/caregiver verbalized understanding and denies any additional questions or concerns at this time. RNCC engaged in [employer] communications during encounter.      Outreach Frequency: monthly, more frequently as needed  Future Appointments                In 1 week DHILLON TECH1 [facility], [facility] PSA CLIN    In 1 week RH MA LAB [facility]    In 1 week Patsy Pompa MD [facility]          Plan: RNCC will send clinic care coordination introduction letter & patient centered plan of care to patient via [employer]. Patient/caregiver was provided with writers contact information and encouraged to call with questions, concerns, support needs. RNCC will remain available as needed. CHWCC will follow up with patient/caregiver again in 1 month. RNCC will review chart in 6 weeks.    Crystal Lin RN Care Coordinator  [facility] [place], [place], [place]  Email: [email or web address]  Phone: [phone]

## 2025-05-07 ENCOUNTER — OFFICE VISIT (OUTPATIENT)
Dept: FAMILY MEDICINE | Facility: CLINIC | Age: 85
End: 2025-05-07
Payer: COMMERCIAL

## 2025-05-07 VITALS
DIASTOLIC BLOOD PRESSURE: 67 MMHG | OXYGEN SATURATION: 96 % | SYSTOLIC BLOOD PRESSURE: 132 MMHG | TEMPERATURE: 97.4 F | BODY MASS INDEX: 35.16 KG/M2 | HEIGHT: 68 IN | HEART RATE: 68 BPM | WEIGHT: 232 LBS

## 2025-05-07 DIAGNOSIS — Z87.440 PERSONAL HISTORY OF URINARY TRACT INFECTION: ICD-10-CM

## 2025-05-07 DIAGNOSIS — R79.0 DECREASED BLOOD CALCIUM: ICD-10-CM

## 2025-05-07 DIAGNOSIS — Z59.9 HOUSING PROBLEMS: ICD-10-CM

## 2025-05-07 DIAGNOSIS — E11.22 TYPE 2 DIABETES MELLITUS WITH STAGE 3A CHRONIC KIDNEY DISEASE, WITHOUT LONG-TERM CURRENT USE OF INSULIN (H): ICD-10-CM

## 2025-05-07 DIAGNOSIS — N18.32 STAGE 3B CHRONIC KIDNEY DISEASE (H): ICD-10-CM

## 2025-05-07 DIAGNOSIS — R53.81 PHYSICAL DECONDITIONING: ICD-10-CM

## 2025-05-07 DIAGNOSIS — I50.32 HYPERTENSIVE HEART DISEASE WITH CHRONIC DIASTOLIC CONGESTIVE HEART FAILURE (H): ICD-10-CM

## 2025-05-07 DIAGNOSIS — D69.3 IDIOPATHIC THROMBOCYTOPENIC PURPURA (H): ICD-10-CM

## 2025-05-07 DIAGNOSIS — N18.31 TYPE 2 DIABETES MELLITUS WITH STAGE 3A CHRONIC KIDNEY DISEASE, WITHOUT LONG-TERM CURRENT USE OF INSULIN (H): ICD-10-CM

## 2025-05-07 DIAGNOSIS — R51.9 INTRACTABLE HEADACHE, UNSPECIFIED CHRONICITY PATTERN, UNSPECIFIED HEADACHE TYPE: Primary | ICD-10-CM

## 2025-05-07 DIAGNOSIS — Z13.6 SCREENING FOR CARDIOVASCULAR CONDITION: ICD-10-CM

## 2025-05-07 DIAGNOSIS — Z95.0 S/P PLACEMENT OF CARDIAC PACEMAKER: ICD-10-CM

## 2025-05-07 DIAGNOSIS — M54.2 NECK PAIN: ICD-10-CM

## 2025-05-07 DIAGNOSIS — R29.898 NECK TIGHTNESS: ICD-10-CM

## 2025-05-07 DIAGNOSIS — I11.0 HYPERTENSIVE HEART DISEASE WITH CHRONIC DIASTOLIC CONGESTIVE HEART FAILURE (H): ICD-10-CM

## 2025-05-07 DIAGNOSIS — H47.011 ISCHEMIC OPTIC NEUROPATHY OF RIGHT EYE: ICD-10-CM

## 2025-05-07 PROBLEM — E87.70 HYPERVOLEMIA, UNSPECIFIED HYPERVOLEMIA TYPE: Status: RESOLVED | Noted: 2024-02-22 | Resolved: 2025-05-07

## 2025-05-07 PROBLEM — F43.22 ADJUSTMENT DISORDER WITH ANXIOUS MOOD: Status: RESOLVED | Noted: 2024-04-12 | Resolved: 2025-05-07

## 2025-05-07 PROBLEM — F43.22 ADJUSTMENT DISORDER WITH ANXIETY: Status: RESOLVED | Noted: 2022-11-28 | Resolved: 2025-05-07

## 2025-05-07 PROCEDURE — 3075F SYST BP GE 130 - 139MM HG: CPT | Performed by: NURSE PRACTITIONER

## 2025-05-07 PROCEDURE — 3078F DIAST BP <80 MM HG: CPT | Performed by: NURSE PRACTITIONER

## 2025-05-07 PROCEDURE — 1111F DSCHRG MED/CURRENT MED MERGE: CPT | Performed by: NURSE PRACTITIONER

## 2025-05-07 PROCEDURE — 99495 TRANSJ CARE MGMT MOD F2F 14D: CPT | Performed by: NURSE PRACTITIONER

## 2025-05-07 SDOH — ECONOMIC STABILITY - INCOME SECURITY: PROBLEM RELATED TO HOUSING AND ECONOMIC CIRCUMSTANCES, UNSPECIFIED: Z59.9

## 2025-05-07 ASSESSMENT — ANXIETY QUESTIONNAIRES
2. NOT BEING ABLE TO STOP OR CONTROL WORRYING: MORE THAN HALF THE DAYS
1. FEELING NERVOUS, ANXIOUS, OR ON EDGE: SEVERAL DAYS
GAD7 TOTAL SCORE: 6
IF YOU CHECKED OFF ANY PROBLEMS ON THIS QUESTIONNAIRE, HOW DIFFICULT HAVE THESE PROBLEMS MADE IT FOR YOU TO DO YOUR WORK, TAKE CARE OF THINGS AT HOME, OR GET ALONG WITH OTHER PEOPLE: SOMEWHAT DIFFICULT
GAD7 TOTAL SCORE: 6
3. WORRYING TOO MUCH ABOUT DIFFERENT THINGS: MORE THAN HALF THE DAYS
6. BECOMING EASILY ANNOYED OR IRRITABLE: NOT AT ALL
5. BEING SO RESTLESS THAT IT IS HARD TO SIT STILL: NOT AT ALL
7. FEELING AFRAID AS IF SOMETHING AWFUL MIGHT HAPPEN: SEVERAL DAYS

## 2025-05-07 ASSESSMENT — PATIENT HEALTH QUESTIONNAIRE - PHQ9
5. POOR APPETITE OR OVEREATING: NOT AT ALL
SUM OF ALL RESPONSES TO PHQ QUESTIONS 1-9: 15

## 2025-05-07 NOTE — PATIENT INSTRUCTIONS
Take your meds as prescribed.    Scripts / refills sent to your pharmacy.   No new meds today.    Get labs when you see Hematology.     Increase water (48-64 ounces per day), fruits, and vegetables.    Exercise at least 5 days per week for 30 minutes each day.  Walking daily.     Follow-up in 2 weeks or sooner as needed.    Call or MyChart as needed as well.

## 2025-05-07 NOTE — PROGRESS NOTES
Assessment & Plan   (R51.9) Intractable headache, unspecified chronicity pattern, unspecified headache type (primary encounter diagnosis)  Comment: Chronic.  Improved. Has meds to help with neck pain.  Will start PT.   Plan: Vitamin D Deficiency    (M54.2) Neck pain  Comment: subacute.  Start PT.  Gentle stretches.  Ice as needed.   Plan: Physical Therapy  Referral, Vitamin D         Deficiency    (R29.898) Neck tightness  Comment: Chronic.  Improved.    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 Will see Neurology in July 2025.   Plan: Primary Care - Care Coordination Referral,         Vitamin D Deficiency    (E11.22,  N18.31) Type 2 diabetes mellitus with stage 3a chronic kidney disease, without long-term current use of insulin (H)  Comment: Stable.  A1c 6.6 in Jan 2025.   Plan: Lipid panel reflex to direct LDL Non-fasting,         Primary Care - Care Coordination Referral,         Vitamin D Deficiency, Basic metabolic panel    (N18.32) Stage 3b chronic kidney disease (H)  Comment: Chronic.  Stable.    Plan: Albumin Random Urine Quantitative with Creat         Ratio, Parathyroid Hormone Intact, Primary Care        - Care Coordination Referral, Basic metabolic         panel    (Z13.6) Screening for cardiovascular condition  Comment:     (Z87.440) Personal history of urinary tract infection  Comment: Chronic UTIs.  Finished Omnicef.  Will recheck urine in near future.  Declines checking urine today.   Plan: Primary Care - Care Coordination Referral, UA         Macroscopic with reflex to Microscopic and         Culture    (D69.3) Idiopathic thrombocytopenic purpura (H)  Comment: Chronic.    Plan: Primary Care - Care Coordination Referral    (I11.0,  I50.32) Hypertensive heart disease with chronic diastolic congestive heart failure (H)  Comment: Chronic.  Follows Cardiology.    Plan: Primary Care - Care Coordination Referral,         Vitamin D Deficiency    (Z95.0) S/P placement of cardiac pacemaker: 4/5/2024  Comment:     (R53.81) Physical deconditioning  Comment: Start PT soon.    Plan: Primary Care - Care Coordination Referral    (R79.0) Decreased blood calcium  Comment: Recheck in near future.    Plan: Parathyroid Hormone Intact, Phosphorus, Vitamin        D Deficiency    (Z59.9) Housing problems  Comment: Will have Care Coordination help her with housing.  Ready to move into assisted living.  Needs more assistance with meds and socialization.   Plan: Primary Care - Care Coordination Referral       Patient Instructions   Take your meds as prescribed.    Scripts / refills sent to your pharmacy.   No new meds today.    Get labs when you see Hematology.     Increase water (48-64 ounces per day), fruits, and vegetables.    Exercise at least 5 days per week for 30 minutes each day.  Walking daily.     Follow-up in 2 weeks or sooner as needed.    Call or MyChart as needed as well.         The longitudinal plan of care for the diagnosis(es)/condition(s) as documented were addressed during this visit. Due to the added complexity in care, I will continue to support Savanna in the subsequent management and with ongoing continuity of care.      MED REC REQUIRED  Post Medication Reconciliation Status: discharge medications reconciled, continue medications without change        Subjective   Savanna is a 84 year old, presenting for the following health issues:  Hospital F/U and Establish Care  Here with daughter.          5/7/2025    12:49 PM   Additional Questions   Roomed by Karen MURRIETA   Accompanied by Daughter Roya       Via the Health Maintenance questionnaire, the patient has reported the following services have been completed , this information has been sent to the abstraction team.  HPI          4/28/2025   Post Discharge Outreach   How are you doing now that you are home? She stated she is in  terrible pain right now. She told them what medications work for her headaches but they didn't give her those medications. She stated she is not following with [facility] any more due to PA at clinic ordering a medication she is allergic to. She stated she is so sad that that happened.    I'm doing much better!  I can move my head to the side where I wasn't able to do that before   How are your symptoms? (Red Flag symptoms escalate to triage hotline per guidelines) Improved    Improved   Does the patient have their discharge instructions?  Yes    Yes   Does the patient have questions regarding their discharge instructions?  No    No   Were you started on any new medications or were there changes to any of your previous medications?  Yes    Yes   Does the patient have all of their medications? Yes    Yes   Do you have questions regarding any of your medications?  No    No   Do you have all of your needed medical supplies or equipment (DME)?  (i.e. oxygen tank, CPAP, cane, etc.) Yes    Yes       Multiple values from one day are sorted in reverse-chronological order       Hospital Follow-up Visit:  Hospital/Nursing Home/ Rehab Facility: [facility]  Most Recent Admission Date: 4/23/2025   Most Recent Admission Diagnosis: Acute intractable headache, unspecified headache type - R51.9     Most Recent Discharge Date: 4/26/2025   Most Recent Discharge Diagnosis: Acute intractable headache, unspecified headache type - R51.9  Urinary tract infection without hematuria, site unspecified - N39.0   Was the patient in the ICU or did the patient experience delirium during hospitalization?  No  Do you have any other stressors you would like to discuss with your provider? OTHER: establishing care   Pt would like it noted she had a stroke in the right eye     Problems taking medications regularly:  None  Medication changes since discharge: None  Problems adhering to non-medication therapy:   "None    Summary of hospitalization:  [facility] discharge summary reviewed  Diagnostic Tests/Treatments reviewed.  Follow up needed: Will see Neurology regarding headaches in the near future.    Other Healthcare Providers Involved in Patient s Care:            Update since discharge: Still has neck tightness and decreased ROM.  No significant pain, just unable to move neck fully.  No headache.  Not taking Tylenol or Flexeril or Valium.  Thinks her UTI cleared, but not certain.  She took all the antibiotic.  Unable to leave urine today.  Will get labs and urine next week upon follow-up with Cardiology.        Plan of care communicated with patient           Review of Systems  Constitutional, neuro, ENT, endocrine, pulmonary, cardiac, gastrointestinal, genitourinary, musculoskeletal, integument and psychiatric systems are negative, except as otherwise noted.        Objective    /67 (BP Location: Left arm, Patient Position: Sitting, Cuff Size: Adult Regular)   Pulse 68   Temp 97.4  F (36.3  C) (Tympanic)   Ht 1.715 m (5' 7.5\")   Wt 105.2 kg (232 lb)   LMP  (LMP Unknown)   SpO2 96%   BMI 35.80 kg/m    Body mass index is 35.8 kg/m .  Physical Exam  Constitutional:       General: She is not in acute distress.     Appearance: Normal appearance. She is not ill-appearing.   HENT:      Head: Normocephalic.   Cardiovascular:      Rate and Rhythm: Normal rate and regular rhythm.      Heart sounds: Normal heart sounds. No murmur heard.     No friction rub. No gallop.   Pulmonary:      Effort: Pulmonary effort is normal. No respiratory distress.      Breath sounds: Normal breath sounds. No wheezing, rhonchi or rales.   Musculoskeletal:      Cervical back: Normal range of motion and neck supple. No rigidity.   Neurological:      General: No focal deficit present.      Mental Status: She is alert.   Psychiatric:         Mood and Affect: Mood normal.         Behavior: Behavior normal.         Thought " Content: Thought content normal.         Judgment: Judgment normal.             Signed Electronically by: BATSHEVA Joel CNP

## 2025-05-09 ENCOUNTER — ANCILLARY PROCEDURE (OUTPATIENT)
Dept: CARDIOLOGY | Facility: CLINIC | Age: 85
End: 2025-05-09
Attending: INTERNAL MEDICINE
Payer: COMMERCIAL

## 2025-05-09 DIAGNOSIS — Z95.0 CARDIAC PACEMAKER IN SITU: ICD-10-CM

## 2025-05-09 DIAGNOSIS — I48.91 ATRIAL FIBRILLATION (H): ICD-10-CM

## 2025-05-09 PROCEDURE — 93296 REM INTERROG EVL PM/IDS: CPT | Performed by: INTERNAL MEDICINE

## 2025-05-09 PROCEDURE — 93294 REM INTERROG EVL PM/LDLS PM: CPT | Performed by: INTERNAL MEDICINE

## 2025-05-11 LAB
MDC_IDC_EPISODE_DTM: NORMAL
MDC_IDC_EPISODE_DTM: NORMAL
MDC_IDC_EPISODE_DURATION: 1 S
MDC_IDC_EPISODE_DURATION: 1 S
MDC_IDC_EPISODE_ID: 97
MDC_IDC_EPISODE_ID: 98
MDC_IDC_EPISODE_TYPE: NORMAL
MDC_IDC_EPISODE_TYPE: NORMAL
MDC_IDC_LEAD_CONNECTION_STATUS: NORMAL
MDC_IDC_LEAD_IMPLANT_DT: NORMAL
MDC_IDC_LEAD_LOCATION: NORMAL
MDC_IDC_LEAD_LOCATION_DETAIL_1: NORMAL
MDC_IDC_LEAD_MFG: NORMAL
MDC_IDC_LEAD_MODEL: NORMAL
MDC_IDC_LEAD_POLARITY_TYPE: NORMAL
MDC_IDC_LEAD_SERIAL: NORMAL
MDC_IDC_MSMT_BATTERY_DTM: NORMAL
MDC_IDC_MSMT_BATTERY_REMAINING_LONGEVITY: 159 MO
MDC_IDC_MSMT_BATTERY_RRT_TRIGGER: 2.62
MDC_IDC_MSMT_BATTERY_STATUS: NORMAL
MDC_IDC_MSMT_BATTERY_VOLTAGE: 3.09 V
MDC_IDC_MSMT_LEADCHNL_RV_IMPEDANCE_VALUE: 399 OHM
MDC_IDC_MSMT_LEADCHNL_RV_IMPEDANCE_VALUE: 589 OHM
MDC_IDC_MSMT_LEADCHNL_RV_PACING_THRESHOLD_AMPLITUDE: 0.88 V
MDC_IDC_MSMT_LEADCHNL_RV_PACING_THRESHOLD_PULSEWIDTH: 0.4 MS
MDC_IDC_MSMT_LEADCHNL_RV_SENSING_INTR_AMPL: 13.38 MV
MDC_IDC_MSMT_LEADCHNL_RV_SENSING_INTR_AMPL: 13.38 MV
MDC_IDC_PG_IMPLANT_DTM: NORMAL
MDC_IDC_PG_MFG: NORMAL
MDC_IDC_PG_MODEL: NORMAL
MDC_IDC_PG_SERIAL: NORMAL
MDC_IDC_PG_TYPE: NORMAL
MDC_IDC_SESS_CLINIC_NAME: NORMAL
MDC_IDC_SESS_DTM: NORMAL
MDC_IDC_SESS_TYPE: NORMAL
MDC_IDC_SET_BRADY_HYSTRATE: NORMAL
MDC_IDC_SET_BRADY_LOWRATE: 60 {BEATS}/MIN
MDC_IDC_SET_BRADY_MAX_SENSOR_RATE: 130 {BEATS}/MIN
MDC_IDC_SET_BRADY_MODE: NORMAL
MDC_IDC_SET_LEADCHNL_RV_PACING_AMPLITUDE: 2 V
MDC_IDC_SET_LEADCHNL_RV_PACING_ANODE_ELECTRODE_1: NORMAL
MDC_IDC_SET_LEADCHNL_RV_PACING_ANODE_LOCATION_1: NORMAL
MDC_IDC_SET_LEADCHNL_RV_PACING_CAPTURE_MODE: NORMAL
MDC_IDC_SET_LEADCHNL_RV_PACING_CATHODE_ELECTRODE_1: NORMAL
MDC_IDC_SET_LEADCHNL_RV_PACING_CATHODE_LOCATION_1: NORMAL
MDC_IDC_SET_LEADCHNL_RV_PACING_POLARITY: NORMAL
MDC_IDC_SET_LEADCHNL_RV_PACING_PULSEWIDTH: 0.4 MS
MDC_IDC_SET_LEADCHNL_RV_SENSING_ANODE_ELECTRODE_1: NORMAL
MDC_IDC_SET_LEADCHNL_RV_SENSING_ANODE_LOCATION_1: NORMAL
MDC_IDC_SET_LEADCHNL_RV_SENSING_CATHODE_ELECTRODE_1: NORMAL
MDC_IDC_SET_LEADCHNL_RV_SENSING_CATHODE_LOCATION_1: NORMAL
MDC_IDC_SET_LEADCHNL_RV_SENSING_POLARITY: NORMAL
MDC_IDC_SET_LEADCHNL_RV_SENSING_SENSITIVITY: 0.9 MV
MDC_IDC_SET_ZONE_DETECTION_INTERVAL: 400 MS
MDC_IDC_SET_ZONE_STATUS: NORMAL
MDC_IDC_SET_ZONE_TYPE: NORMAL
MDC_IDC_SET_ZONE_VENDOR_TYPE: NORMAL
MDC_IDC_STAT_BRADY_DTM_END: NORMAL
MDC_IDC_STAT_BRADY_DTM_START: NORMAL
MDC_IDC_STAT_BRADY_RV_PERCENT_PACED: 75.43 %
MDC_IDC_STAT_EPISODE_RECENT_COUNT: 0
MDC_IDC_STAT_EPISODE_RECENT_COUNT: 0
MDC_IDC_STAT_EPISODE_RECENT_COUNT: 2
MDC_IDC_STAT_EPISODE_RECENT_COUNT_DTM_END: NORMAL
MDC_IDC_STAT_EPISODE_RECENT_COUNT_DTM_START: NORMAL
MDC_IDC_STAT_EPISODE_TOTAL_COUNT: 0
MDC_IDC_STAT_EPISODE_TOTAL_COUNT: 0
MDC_IDC_STAT_EPISODE_TOTAL_COUNT: 98
MDC_IDC_STAT_EPISODE_TOTAL_COUNT_DTM_END: NORMAL
MDC_IDC_STAT_EPISODE_TOTAL_COUNT_DTM_START: NORMAL
MDC_IDC_STAT_EPISODE_TYPE: NORMAL

## 2025-05-12 ENCOUNTER — ONCOLOGY VISIT (OUTPATIENT)
Dept: ONCOLOGY | Facility: CLINIC | Age: 85
End: 2025-05-12
Attending: INTERNAL MEDICINE
Payer: COMMERCIAL

## 2025-05-12 ENCOUNTER — PATIENT OUTREACH (OUTPATIENT)
Dept: CARE COORDINATION | Facility: CLINIC | Age: 85
End: 2025-05-12
Payer: COMMERCIAL

## 2025-05-12 ENCOUNTER — LAB (OUTPATIENT)
Dept: INFUSION THERAPY | Facility: CLINIC | Age: 85
End: 2025-05-12
Attending: INTERNAL MEDICINE
Payer: COMMERCIAL

## 2025-05-12 VITALS
HEART RATE: 65 BPM | DIASTOLIC BLOOD PRESSURE: 78 MMHG | TEMPERATURE: 97.6 F | HEIGHT: 68 IN | OXYGEN SATURATION: 95 % | SYSTOLIC BLOOD PRESSURE: 132 MMHG | RESPIRATION RATE: 16 BRPM | WEIGHT: 235 LBS | BODY MASS INDEX: 35.61 KG/M2

## 2025-05-12 DIAGNOSIS — H47.011 ISCHEMIC OPTIC NEUROPATHY OF RIGHT EYE: ICD-10-CM

## 2025-05-12 DIAGNOSIS — M54.2 NECK PAIN: ICD-10-CM

## 2025-05-12 DIAGNOSIS — I11.0 HYPERTENSIVE HEART DISEASE WITH CHRONIC DIASTOLIC CONGESTIVE HEART FAILURE (H): ICD-10-CM

## 2025-05-12 DIAGNOSIS — R51.9 INTRACTABLE HEADACHE, UNSPECIFIED CHRONICITY PATTERN, UNSPECIFIED HEADACHE TYPE: ICD-10-CM

## 2025-05-12 DIAGNOSIS — D69.3 IDIOPATHIC THROMBOCYTOPENIC PURPURA (H): ICD-10-CM

## 2025-05-12 DIAGNOSIS — D69.6 THROMBOCYTOPENIA: Primary | ICD-10-CM

## 2025-05-12 DIAGNOSIS — N18.32 STAGE 3B CHRONIC KIDNEY DISEASE (H): ICD-10-CM

## 2025-05-12 DIAGNOSIS — I50.32 HYPERTENSIVE HEART DISEASE WITH CHRONIC DIASTOLIC CONGESTIVE HEART FAILURE (H): ICD-10-CM

## 2025-05-12 DIAGNOSIS — E11.22 TYPE 2 DIABETES MELLITUS WITH STAGE 3A CHRONIC KIDNEY DISEASE, WITHOUT LONG-TERM CURRENT USE OF INSULIN (H): ICD-10-CM

## 2025-05-12 DIAGNOSIS — I48.20 CHRONIC ATRIAL FIBRILLATION (H): ICD-10-CM

## 2025-05-12 DIAGNOSIS — R79.0 DECREASED BLOOD CALCIUM: ICD-10-CM

## 2025-05-12 DIAGNOSIS — N18.31 TYPE 2 DIABETES MELLITUS WITH STAGE 3A CHRONIC KIDNEY DISEASE, WITHOUT LONG-TERM CURRENT USE OF INSULIN (H): ICD-10-CM

## 2025-05-12 DIAGNOSIS — R29.898 NECK TIGHTNESS: ICD-10-CM

## 2025-05-12 DIAGNOSIS — Z87.440 PERSONAL HISTORY OF URINARY TRACT INFECTION: ICD-10-CM

## 2025-05-12 LAB
ANION GAP SERPL CALCULATED.3IONS-SCNC: 11 MMOL/L (ref 7–15)
BASOPHILS # BLD AUTO: 0.1 10E3/UL (ref 0–0.2)
BASOPHILS NFR BLD AUTO: 1 %
BUN SERPL-MCNC: 18.5 MG/DL (ref 8–23)
CALCIUM SERPL-MCNC: 9.5 MG/DL (ref 8.8–10.4)
CHLORIDE SERPL-SCNC: 102 MMOL/L (ref 98–107)
CHOLEST SERPL-MCNC: 143 MG/DL
CREAT SERPL-MCNC: 0.92 MG/DL (ref 0.51–0.95)
EGFRCR SERPLBLD CKD-EPI 2021: 62 ML/MIN/1.73M2
EOSINOPHIL # BLD AUTO: 0.3 10E3/UL (ref 0–0.7)
EOSINOPHIL NFR BLD AUTO: 7 %
ERYTHROCYTE [DISTWIDTH] IN BLOOD BY AUTOMATED COUNT: 13.4 % (ref 10–15)
FASTING STATUS PATIENT QL REPORTED: YES
GLUCOSE SERPL-MCNC: 147 MG/DL (ref 70–99)
HCO3 SERPL-SCNC: 26 MMOL/L (ref 22–29)
HCT VFR BLD AUTO: 38.5 % (ref 35–47)
HDLC SERPL-MCNC: 43 MG/DL
HGB BLD-MCNC: 13.1 G/DL (ref 11.7–15.7)
IMM GRANULOCYTES # BLD: 0 10E3/UL
IMM GRANULOCYTES NFR BLD: 0 %
LDLC SERPL CALC-MCNC: 82 MG/DL
LYMPHOCYTES # BLD AUTO: 1.2 10E3/UL (ref 0.8–5.3)
LYMPHOCYTES NFR BLD AUTO: 26 %
MCH RBC QN AUTO: 31.1 PG (ref 26.5–33)
MCHC RBC AUTO-ENTMCNC: 34 G/DL (ref 31.5–36.5)
MCV RBC AUTO: 91 FL (ref 78–100)
MONOCYTES # BLD AUTO: 0.5 10E3/UL (ref 0–1.3)
MONOCYTES NFR BLD AUTO: 10 %
NEUTROPHILS # BLD AUTO: 2.7 10E3/UL (ref 1.6–8.3)
NEUTROPHILS NFR BLD AUTO: 56 %
NONHDLC SERPL-MCNC: 100 MG/DL
NRBC # BLD AUTO: 0 10E3/UL
NRBC BLD AUTO-RTO: 0 /100
PHOSPHATE SERPL-MCNC: 2.8 MG/DL (ref 2.5–4.5)
PLATELET # BLD AUTO: 136 10E3/UL (ref 150–450)
POTASSIUM SERPL-SCNC: 4.6 MMOL/L (ref 3.4–5.3)
PTH-INTACT SERPL-MCNC: 33 PG/ML (ref 15–65)
RBC # BLD AUTO: 4.21 10E6/UL (ref 3.8–5.2)
SODIUM SERPL-SCNC: 139 MMOL/L (ref 135–145)
TRIGL SERPL-MCNC: 88 MG/DL
VIT D+METAB SERPL-MCNC: 47 NG/ML (ref 20–50)
WBC # BLD AUTO: 4.9 10E3/UL (ref 4–11)

## 2025-05-12 PROCEDURE — 80048 BASIC METABOLIC PNL TOTAL CA: CPT | Performed by: NURSE PRACTITIONER

## 2025-05-12 PROCEDURE — 99214 OFFICE O/P EST MOD 30 MIN: CPT | Performed by: INTERNAL MEDICINE

## 2025-05-12 PROCEDURE — 83970 ASSAY OF PARATHORMONE: CPT | Performed by: NURSE PRACTITIONER

## 2025-05-12 PROCEDURE — 85025 COMPLETE CBC W/AUTO DIFF WBC: CPT | Performed by: PHYSICIAN ASSISTANT

## 2025-05-12 PROCEDURE — 82465 ASSAY BLD/SERUM CHOLESTEROL: CPT | Performed by: NURSE PRACTITIONER

## 2025-05-12 PROCEDURE — G0463 HOSPITAL OUTPT CLINIC VISIT: HCPCS | Performed by: INTERNAL MEDICINE

## 2025-05-12 PROCEDURE — 84100 ASSAY OF PHOSPHORUS: CPT | Performed by: NURSE PRACTITIONER

## 2025-05-12 PROCEDURE — 36415 COLL VENOUS BLD VENIPUNCTURE: CPT

## 2025-05-12 PROCEDURE — G2211 COMPLEX E/M VISIT ADD ON: HCPCS | Performed by: INTERNAL MEDICINE

## 2025-05-12 PROCEDURE — 82306 VITAMIN D 25 HYDROXY: CPT | Performed by: NURSE PRACTITIONER

## 2025-05-12 ASSESSMENT — PAIN SCALES - GENERAL: PAINLEVEL_OUTOF10: NO PAIN (0)

## 2025-05-12 NOTE — PROGRESS NOTES
Nursing Note:  Savanna Rehman presents today for Labs.    Patient seen by provider today: No   present during visit today: Not Applicable.    Note: N/A.    Intravenous Access:  Lab draw site Right Hand, Needle type butterfly, Gauge 23.  Labs drawn without difficulty.    Discharge Plan:   Patient was sent to [facility] for MD appointment.    Mauricio Retana RN

## 2025-05-12 NOTE — PROGRESS NOTES
[facility]     Hematology/Oncology Consult Note     Date of visit 5/12/25   Reason for Consult: Thrombocytopenia - ITP follow up- right vision loss.   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   Would consider Asa 81 mg daily with Eliquis 2.5 ot 5 mg if the recent CVA ischemic episode is thought to be atherosclerotic and not simply embolic from the A-fib. Patient following up with primary care and Neurology next week.  Interrogation of pacemaker on 5/9/2025: Arrhythmia Data Since: 1/30/25 Atrial Arrhythmia: n/a Ventricular Arrhythmia: 6 ventricular high rates logged. EGMs show irregular VS events suggestive of RVR lasting 1-2 seconds, average rates 150s-170s. Taking torpol XL.    Lost vision in her right eye in early April vs February presumed ischemic. She saw an ophthalmologist 4/9 and then was sent to ED. CT negative for acute process.  MRI brain 4/15/2025- 1.  No acute intracranial pathology. No abnormal contrast enhancement. 2.  Chronic small vessel ischemic disease and generalized cerebral atrophy.  CT head and CTA head and neck- No CT evidence for acute intracranial process. Brain atrophy and presumed chronic microvascular ischemic changes a. HEAD CTA: No vascular cutoff of the proximal major intracranial arteries. Moderate focal stenosis of the mid left P2 segment, also present on 4/10/2025. NECK CTA:  Patent arteries in the neck without evidence of dissection. Atherosclerotic disease along the carotid arteries without significant stenosis by NASCET criteria.    LE edema, pitting- compression stockings and follow up with PCP currently on  lasix but believes she is only on 20 mg     Problem List:  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 PLC 152K 9/5/2024--> 70K on 9/13/2024 and 59K on 9/16/2024. CT imaging without malignancy however cirrhosis and splenomegaly plus diverticulosis---> PLC 128K on 1/31/2025   Atrial fibrillation on chronic anticoagulation.  Cirrhosis.  Congestive heart failure.  Coronary artery disease.  Diabetes mellitus type 2.  Fibromyalgia.  GERD.  Gout.  Sleep apnea.        History of present illness: Savanna Rehman is a 84 year old patient admitted to [facility] for PLT count of 2K presented with petechia. NO precipitating factors. CT imaging without malignancy however cirrhosis and splenomegaly plus diverticulosis noted. Baseline plt ~100K with underlying cirrhosis. Given abrupt drop presume cause of acute thrombocytopenia is ITP. Reports recent initiation of Protonix. S/p IVIG 8/31/24 and 9/1/24 S/p Dex 40mg x 4 days (8/31-9/3/24). PLT improved. Switched to Eliquis from coumadin.      She was discharged to the TCU In September in 2024 and her plts have been slowly uptrending. She denied any bleeding- she is back home- daughter not present during the visit. She is on Eliquis. She reports bruising while on Eliquis.      5/12/2025- returns for an office visit. She has been on eliquis 5 mg po bid and tolerating well. Continues to have vision loss in the right eye.     Interim history  She has been seen multiple times in the ED and opthalmology, she reports blurry vision that was seen by the ED and opthalmology- concern for ischemic episodes MRI not done. Reports +1-2 LE edema. On lasix but doesn't recall the dose.      Review of Systems: See interval hx. Denies fevers, chills, HA, dizziness, n/t, changes in vision, cough, sore throat, CP, SOB, abdominal pain, N/V, diarrhea, changes in urination, bleeding, bruising, rash.      PMHx and Social Hx reviewed per  EPIC.     Medications:  Current Outpatient Prescriptions          Current Outpatient Medications   Medication Sig Dispense Refill    apixaban ANTICOAGULANT (ELIQUIS) 2.5 MG tablet Take 1 tablet (2.5 mg) by mouth 2 times daily. 180 tablet 1    blood glucose (NO BRAND SPECIFIED) test strip Use to test blood sugar one times daily or as directed. To accompany: Blood Glucose Monitor Brands: per insurance. 100 strip 6    blood glucose monitoring (NO BRAND SPECIFIED) meter device kit Use to test blood sugar one times daily or as directed. Preferred blood glucose meter OR supplies to accompany: Blood Glucose Monitor Brands: per insurance. 1 kit 0    cholecalciferol (VITAMIN D3) 10 mcg (400 units) TABS tablet Take 10 mcg by mouth daily.        EPINEPHrine (ANY BX GENERIC EQUIV) 0.3 MG/0.3ML injection 2-pack Inject 0.3 mLs (0.3 mg) into the muscle as needed for anaphylaxis May repeat one time in 5-15 minutes if response to initial dose is inadequate. 2 each 3    furosemide (LASIX) 40 MG tablet Take 1 tablet by mouth once daily 90 tablet 1    glipiZIDE (GLUCOTROL XL) 2.5 MG 24 hr tablet Take 1 tablet by mouth twice daily 180 tablet 3    loperamide (IMODIUM) 2 MG capsule TAKE 1 CAPSULE BY MOUTH 4 TIMES DAILY AS NEEDED FOR DIARRHEA 60 capsule 0    metoprolol succinate ER (TOPROL XL) 25 MG 24 hr tablet Take 1 tablet (25 mg) by mouth daily. 30 tablet 11    thin (NO BRAND SPECIFIED) lancets Use with lanceting device. To accompany: Blood Glucose Monitor Brands: per insurance. 100 each 6            Allergies         Allergies   Allergen Reactions    Augmented Betamethasone Diprop [Betamethasone] Other (See Comments)       Severe yeast infection    Petrolatum Anaphylaxis and Swelling       Rash and swelling    Shellfish-Derived Products Anaphylaxis       Tongue swelling    Aspirin Swelling       tongue swelling    Bacitracin         Rash swelling    Bactrim [Sulfamethoxazole-Trimethoprim] Dizziness    Darvon [Propoxyphene] Swelling        Throat closes    Dilaudid [Hydromorphone]         No side effects from fentanyl June 2023    Levaquin [Levofloxacin] Swelling       Tongue swelling    Lidocaine         Facial swelling     Morphine Unknown       [facility]    Neomycin Swelling       rash    Neosporin [Neomycin-Polymyxin-Gramicidin] Swelling       rash    Nitrofurantoin         SOB, GI upset,    Oxycodone         Severe itching    Percocet [Oxycodone-Acetaminophen] Unknown    Percodan [Oxycodone-Aspirin]         Severe itching    Polymyxin B      Pramoxine      Tramadol      Vicodin [Hydrocodone-Acetaminophen]         Severe itching       Xarelto [Rivaroxaban]      Adhesive Tape Rash       Band aids     Codeine Rash    Hydrocortisone Rash and Swelling    Other Environmental Allergy Rash       Adhesive tape   Band aids                EXAM:     LMP  (LMP Unknown)      Per report     GENERAL:  female, in no acute distress.  Alert and oriented x3.   HEENT:  Normocephalic, atraumatic.  PERRL, oropharynx clear with no sores or thrush.   LYMPH NODES:  No visible cervical, axillary lymphadenopathy appreciated.  LUNGS:  no wheezing  ABDOMEN:  Soft, nontender and nondistended.  Bowel sounds heard x4.  No apparent hepatosplenomegaly.   PSYCH: Calm and cooperative      CBC RESULTS:   Recent Labs   Lab Test 05/12/25  1216   WBC 4.9   RBC 4.21   HGB 13.1   HCT 38.5   MCV 91   MCH 31.1   MCHC 34.0   RDW 13.4   *      Last Comprehensive Metabolic Panel:  Lab Results   Component Value Date     05/12/2025    POTASSIUM 4.6 05/12/2025    CHLORIDE 102 05/12/2025    CO2 26 05/12/2025    ANIONGAP 11 05/12/2025     (H) 05/12/2025    BUN 18.5 05/12/2025    CR 0.92 05/12/2025    GFRESTIMATED 62 05/12/2025    SAUL 9.5 05/12/2025        30 minutes spent on the date of the encounter doing chart review, review of test results, interpretation of tests, patient visit, and documentation

## 2025-05-12 NOTE — NURSING NOTE
"Oncology Rooming Note    May 12, 2025 12:35 PM   Savanna Rehman is a 84 year old female who presents for:    Chief Complaint   Patient presents with    Oncology Clinic Visit     Initial Vitals: /78   Pulse 65   Temp 97.6  F (36.4  C) (Temporal)   Resp 16   Ht 1.715 m (5' 7.52\")   Wt 106.6 kg (235 lb)   LMP  (LMP Unknown)   SpO2 95%   BMI 36.24 kg/m   Estimated body mass index is 36.24 kg/m  as calculated from the following:    Height as of this encounter: 1.715 m (5' 7.52\").    Weight as of this encounter: 106.6 kg (235 lb). Body surface area is 2.25 meters squared.  No Pain (0) Comment: Data Unavailable   No LMP recorded (lmp unknown). Patient is postmenopausal.  Allergies reviewed: Yes  Medications reviewed: Yes    Medications: Medication refills not needed today.  Pharmacy name entered into [facility]: [facility] [address] [place] [address]    Frailty Screening:   Is the patient here for a new oncology consult visit in cancer care? 2. No    PHQ9:  Did this patient require a PHQ9?: No      Clinical concerns: Follow up       Kusum Tracey MA              "

## 2025-05-12 NOTE — LETTER
5/12/2025      Savanna Rehman  [address]  [facility] [address]      Dear Colleague,    Thank you for referring your patient, Savanna Rehman, to the [facility]. Please see a copy of my visit note below.    [facility]     Hematology/Oncology Consult Note     Date of visit 5/12/25   Reason for Consult: Thrombocytopenia - ITP follow up- right vision loss.   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   Would consider Asa 81 mg daily with Eliquis 2.5 ot 5 mg if the recent CVA ischemic episode is thought to be atherosclerotic and not simply embolic from the A-fib. Patient following up with primary care and Neurology next week.  Interrogation of pacemaker on 5/9/2025: Arrhythmia Data Since: 1/30/25 Atrial Arrhythmia: n/a Ventricular Arrhythmia: 6 ventricular high rates logged. EGMs show irregular VS events suggestive of RVR lasting 1-2 seconds, average rates 150s-170s. Taking torpol XL.    Lost vision in her right eye in early April vs February presumed ischemic. She saw an ophthalmologist 4/9 and then was sent to ED. CT negative for acute process.  MRI brain 4/15/2025- 1.  No acute intracranial pathology. No abnormal contrast enhancement. 2.  Chronic small vessel ischemic disease and generalized cerebral atrophy.  CT head and CTA head and neck- No CT evidence for acute intracranial process. Brain atrophy and presumed chronic microvascular ischemic changes a. HEAD CTA: No vascular cutoff of the proximal major intracranial arteries. Moderate focal stenosis of the mid left P2 segment, also present on  4/10/2025. NECK CTA:  Patent arteries in the neck without evidence of dissection. Atherosclerotic disease along the carotid arteries without significant stenosis by NASCET criteria.    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 PLC 152K 9/5/2024--> 70K on 9/13/2024 and 59K on 9/16/2024. CT imaging without malignancy however cirrhosis and splenomegaly plus diverticulosis---> PLC 128K on 1/31/2025   Atrial fibrillation on chronic anticoagulation.  Cirrhosis.  Congestive heart failure.  Coronary artery disease.  Diabetes mellitus type 2.  Fibromyalgia.  GERD.  Gout.  Sleep apnea.        History of present illness: Savanna Rehamn is a 84 year old patient admitted to [facility] for PLT count of 2K presented with petechia. NO precipitating factors. CT imaging without malignancy however cirrhosis and splenomegaly plus diverticulosis noted. Baseline plt ~100K with underlying cirrhosis. Given abrupt drop presume cause of acute thrombocytopenia is ITP. Reports recent initiation of Protonix. S/p IVIG 8/31/24 and 9/1/24 S/p Dex 40mg x 4 days (8/31-9/3/24). PLT improved. Switched to Eliquis from coumadin.      She was discharged to the TCU In September in 2024 and her plts have been slowly uptrending. She denied any bleeding- she is back home- daughter not present during the visit. She is on Eliquis. She reports bruising while on Eliquis.      5/12/2025- returns for an office visit. She has been on eliquis 5 mg po bid and tolerating well. Continues to have vision loss in the right eye.     Interim history  She has been seen multiple times in the ED and opthalmology, she reports blurry vision that was seen by the ED and opthalmology- concern for ischemic episodes MRI not done. Reports +1-2 LE edema. On lasix but doesn't  recall the dose.      Review of Systems: See interval hx. Denies fevers, chills, HA, dizziness, n/t, changes in vision, cough, sore throat, CP, SOB, abdominal pain, N/V, diarrhea, changes in urination, bleeding, bruising, rash.      PMHx and Social Hx reviewed per EPIC.     Medications:  Current Outpatient Prescriptions          Current Outpatient Medications   Medication Sig Dispense Refill     apixaban ANTICOAGULANT (ELIQUIS) 2.5 MG tablet Take 1 tablet (2.5 mg) by mouth 2 times daily. 180 tablet 1     blood glucose (NO BRAND SPECIFIED) test strip Use to test blood sugar one times daily or as directed. To accompany: Blood Glucose Monitor Brands: per insurance. 100 strip 6     blood glucose monitoring (NO BRAND SPECIFIED) meter device kit Use to test blood sugar one times daily or as directed. Preferred blood glucose meter OR supplies to accompany: Blood Glucose Monitor Brands: per insurance. 1 kit 0     cholecalciferol (VITAMIN D3) 10 mcg (400 units) TABS tablet Take 10 mcg by mouth daily.         EPINEPHrine (ANY BX GENERIC EQUIV) 0.3 MG/0.3ML injection 2-pack Inject 0.3 mLs (0.3 mg) into the muscle as needed for anaphylaxis May repeat one time in 5-15 minutes if response to initial dose is inadequate. 2 each 3     furosemide (LASIX) 40 MG tablet Take 1 tablet by mouth once daily 90 tablet 1     glipiZIDE (GLUCOTROL XL) 2.5 MG 24 hr tablet Take 1 tablet by mouth twice daily 180 tablet 3     loperamide (IMODIUM) 2 MG capsule TAKE 1 CAPSULE BY MOUTH 4 TIMES DAILY AS NEEDED FOR DIARRHEA 60 capsule 0     metoprolol succinate ER (TOPROL XL) 25 MG 24 hr tablet Take 1 tablet (25 mg) by mouth daily. 30 tablet 11     thin (NO BRAND SPECIFIED) lancets Use with lanceting device. To accompany: Blood Glucose Monitor Brands: per insurance. 100 each 6            Allergies         Allergies   Allergen Reactions     Augmented Betamethasone Diprop [Betamethasone] Other (See Comments)       Severe yeast infection     Petrolatum  Anaphylaxis and Swelling       Rash and swelling     Shellfish-Derived Products Anaphylaxis       Tongue swelling     Aspirin Swelling       tongue swelling     Bacitracin         Rash swelling     Bactrim [Sulfamethoxazole-Trimethoprim] Dizziness     Darvon [Propoxyphene] Swelling       Throat closes     Dilaudid [Hydromorphone]         No side effects from fentanyl June 2023     Levaquin [Levofloxacin] Swelling       Tongue swelling     Lidocaine         Facial swelling      Morphine Unknown       Per [facility] 2/23/24     Neomycin Swelling       rash     Neosporin [Neomycin-Polymyxin-Gramicidin] Swelling       rash     Nitrofurantoin         SOB, GI upset,     Oxycodone         Severe itching     Percocet [Oxycodone-Acetaminophen] Unknown     Percodan [Oxycodone-Aspirin]         Severe itching     Polymyxin B       Pramoxine       Tramadol       Vicodin [Hydrocodone-Acetaminophen]         Severe itching        Xarelto [Rivaroxaban]       Adhesive Tape Rash       Band aids      Codeine Rash     Hydrocortisone Rash and Swelling     Other Environmental Allergy Rash       Adhesive tape   Band aids                EXAM:     LMP  (LMP Unknown)      Per report     GENERAL:  female, in no acute distress.  Alert and oriented x3.   HEENT:  Normocephalic, atraumatic.  PERRL, oropharynx clear with no sores or thrush.   LYMPH NODES:  No visible cervical, axillary lymphadenopathy appreciated.  LUNGS:  no wheezing  ABDOMEN:  Soft, nontender and nondistended.  Bowel sounds heard x4.  No apparent hepatosplenomegaly.   PSYCH: Calm and cooperative      CBC RESULTS:   Recent Labs   Lab Test 05/12/25  1216   WBC 4.9   RBC 4.21   HGB 13.1   HCT 38.5   MCV 91   MCH 31.1   MCHC 34.0   RDW 13.4   *      Last Comprehensive Metabolic Panel:  Lab Results   Component Value Date     05/12/2025    POTASSIUM 4.6 05/12/2025    CHLORIDE 102 05/12/2025    CO2 26 05/12/2025    ANIONGAP 11 05/12/2025     (H) 05/12/2025     BUN 18.5 05/12/2025    CR 0.92 05/12/2025    GFRESTIMATED 62 05/12/2025    SAUL 9.5 05/12/2025        30 minutes spent on the date of the encounter doing chart review, review of test results, interpretation of tests, patient visit, and documentation        Again, thank you for allowing me to participate in the care of your patient.        Sincerely,        Patsy Pompa MD    Electronically signed

## 2025-05-13 ENCOUNTER — RESULTS FOLLOW-UP (OUTPATIENT)
Dept: FAMILY MEDICINE | Facility: CLINIC | Age: 85
End: 2025-05-13

## 2025-05-19 DIAGNOSIS — D69.6 THROMBOCYTOPENIA: Primary | ICD-10-CM

## 2025-05-19 DIAGNOSIS — I48.20 CHRONIC ATRIAL FIBRILLATION (H): ICD-10-CM

## 2025-05-19 NOTE — TELEPHONE ENCOUNTER
Signed Prescriptions:                        Disp   Refills    apixaban ANTICOAGULANT (ELIQUIS) 5 MG tabl*120 ta*1        Sig: Take 1 tablet (5 mg) by mouth 2 times daily.  Authorizing Provider: EUFEMIA QUINTANILLA RN BSN

## 2025-05-19 NOTE — TELEPHONE ENCOUNTER
Pending Prescriptions:                       Disp   Refills    apixaban ANTICOAGULANT (ELIQUIS) 2.5 MG t*120 ta*0            Sig: Take 2 tablets (5 mg) by mouth 2 times daily.          Last Written Prescription Date:  4/22/25  Last Fill Quantity: 120,   # refills: 0  Last Office Visit: 5/12/25  Future Office visit: 7/17/25   Next 5 appointments (look out 90 days)      May 21, 2025 11:00 AM  (Arrive by 10:40 AM)  Provider Visit with BATSHEVA Joel CNP  [facility] ([facility] ) [address] [place] [id number] [phone]             Routing refill request to provider for review/approval

## 2025-05-19 NOTE — CONFIDENTIAL NOTE
Savanna calling to request a refill of her eliquis, but is requesting only a 1 month refill at this time, due to affordability concerns. She will eventually go back to 3 month fills, but would like this to be a one time change.    Rx pended in encounter.

## 2025-05-21 ENCOUNTER — VIRTUAL VISIT (OUTPATIENT)
Dept: FAMILY MEDICINE | Facility: CLINIC | Age: 85
End: 2025-05-21
Payer: COMMERCIAL

## 2025-05-21 ENCOUNTER — APPOINTMENT (OUTPATIENT)
Dept: CT IMAGING | Facility: CLINIC | Age: 85
End: 2025-05-21
Attending: EMERGENCY MEDICINE
Payer: COMMERCIAL

## 2025-05-21 ENCOUNTER — HOSPITAL ENCOUNTER (EMERGENCY)
Facility: CLINIC | Age: 85
Discharge: HOME | End: 2025-05-22
Attending: EMERGENCY MEDICINE
Payer: COMMERCIAL

## 2025-05-21 ENCOUNTER — APPOINTMENT (OUTPATIENT)
Dept: GENERAL RADIOLOGY | Facility: CLINIC | Age: 85
End: 2025-05-21
Attending: EMERGENCY MEDICINE
Payer: COMMERCIAL

## 2025-05-21 ENCOUNTER — TELEPHONE (OUTPATIENT)
Dept: FAMILY MEDICINE | Facility: CLINIC | Age: 85
End: 2025-05-21

## 2025-05-21 DIAGNOSIS — R53.1 GENERALIZED WEAKNESS: ICD-10-CM

## 2025-05-21 DIAGNOSIS — R50.9 FEVER, UNSPECIFIED FEVER CAUSE: Primary | ICD-10-CM

## 2025-05-21 DIAGNOSIS — R35.0 URINARY FREQUENCY: ICD-10-CM

## 2025-05-21 DIAGNOSIS — R05.1 ACUTE COUGH: ICD-10-CM

## 2025-05-21 DIAGNOSIS — R53.83 OTHER FATIGUE: ICD-10-CM

## 2025-05-21 DIAGNOSIS — N30.01 ACUTE CYSTITIS WITH HEMATURIA: ICD-10-CM

## 2025-05-21 DIAGNOSIS — Z71.89 OTHER SPECIFIED COUNSELING: Chronic | ICD-10-CM

## 2025-05-21 DIAGNOSIS — R07.89 CHEST HEAVINESS: ICD-10-CM

## 2025-05-21 LAB
ALBUMIN SERPL BCG-MCNC: 3.7 G/DL (ref 3.5–5.2)
ALBUMIN UR-MCNC: 30 MG/DL
ALP SERPL-CCNC: 129 U/L (ref 40–150)
ALT SERPL W P-5'-P-CCNC: 22 U/L (ref 0–50)
ANION GAP SERPL CALCULATED.3IONS-SCNC: 13 MMOL/L (ref 7–15)
APPEARANCE UR: ABNORMAL
AST SERPL W P-5'-P-CCNC: 35 U/L (ref 0–45)
BACTERIA #/AREA URNS HPF: ABNORMAL /HPF
BASOPHILS # BLD AUTO: 0 10E3/UL (ref 0–0.2)
BASOPHILS NFR BLD AUTO: 1 %
BILIRUB SERPL-MCNC: 2.1 MG/DL
BILIRUB UR QL STRIP: NEGATIVE
BUN SERPL-MCNC: 22.4 MG/DL (ref 8–23)
CALCIUM SERPL-MCNC: 9.2 MG/DL (ref 8.8–10.4)
CHLORIDE SERPL-SCNC: 98 MMOL/L (ref 98–107)
COLOR UR AUTO: YELLOW
CREAT SERPL-MCNC: 1.17 MG/DL (ref 0.51–0.95)
EGFRCR SERPLBLD CKD-EPI 2021: 46 ML/MIN/1.73M2
EOSINOPHIL # BLD AUTO: 0.2 10E3/UL (ref 0–0.7)
EOSINOPHIL NFR BLD AUTO: 3 %
ERYTHROCYTE [DISTWIDTH] IN BLOOD BY AUTOMATED COUNT: 13.3 % (ref 10–15)
FLUAV RNA SPEC QL NAA+PROBE: NEGATIVE
FLUBV RNA RESP QL NAA+PROBE: NEGATIVE
GLUCOSE SERPL-MCNC: 134 MG/DL (ref 70–99)
GLUCOSE UR STRIP-MCNC: NEGATIVE MG/DL
HCO3 SERPL-SCNC: 26 MMOL/L (ref 22–29)
HCT VFR BLD AUTO: 37.4 % (ref 35–47)
HGB BLD-MCNC: 12.8 G/DL (ref 11.7–15.7)
HGB UR QL STRIP: ABNORMAL
HOLD SPECIMEN: NORMAL
IMM GRANULOCYTES # BLD: 0 10E3/UL
IMM GRANULOCYTES NFR BLD: 0 %
KETONES UR STRIP-MCNC: NEGATIVE MG/DL
LEUKOCYTE ESTERASE UR QL STRIP: ABNORMAL
LIPASE SERPL-CCNC: 6 U/L (ref 13–60)
LYMPHOCYTES # BLD AUTO: 1.4 10E3/UL (ref 0.8–5.3)
LYMPHOCYTES NFR BLD AUTO: 29 %
MCH RBC QN AUTO: 31.4 PG (ref 26.5–33)
MCHC RBC AUTO-ENTMCNC: 34.2 G/DL (ref 31.5–36.5)
MCV RBC AUTO: 92 FL (ref 78–100)
MONOCYTES # BLD AUTO: 0.7 10E3/UL (ref 0–1.3)
MONOCYTES NFR BLD AUTO: 14 %
MUCOUS THREADS #/AREA URNS LPF: PRESENT /LPF
NEUTROPHILS # BLD AUTO: 2.6 10E3/UL (ref 1.6–8.3)
NEUTROPHILS NFR BLD AUTO: 53 %
NITRATE UR QL: NEGATIVE
NRBC # BLD AUTO: 0 10E3/UL
NRBC BLD AUTO-RTO: 0 /100
PH UR STRIP: 7 [PH] (ref 5–7)
PLATELET # BLD AUTO: 91 10E3/UL (ref 150–450)
POTASSIUM SERPL-SCNC: 3.7 MMOL/L (ref 3.4–5.3)
PROT SERPL-MCNC: 7.1 G/DL (ref 6.4–8.3)
RBC # BLD AUTO: 4.08 10E6/UL (ref 3.8–5.2)
RBC URINE: 41 /HPF
RSV RNA SPEC NAA+PROBE: NEGATIVE
SARS-COV-2 RNA RESP QL NAA+PROBE: NEGATIVE
SODIUM SERPL-SCNC: 137 MMOL/L (ref 135–145)
SP GR UR STRIP: 1.02 (ref 1–1.03)
TROPONIN T SERPL HS-MCNC: 20 NG/L
TROPONIN T SERPL HS-MCNC: 22 NG/L
UROBILINOGEN UR STRIP-MCNC: 6 MG/DL
WBC # BLD AUTO: 5 10E3/UL (ref 4–11)
WBC CLUMPS #/AREA URNS HPF: ABNORMAL /HPF
WBC URINE: >182 /HPF
YEAST #/AREA URNS HPF: ABNORMAL /HPF

## 2025-05-21 PROCEDURE — 84484 ASSAY OF TROPONIN QUANT: CPT | Performed by: EMERGENCY MEDICINE

## 2025-05-21 PROCEDURE — 250N000011 HC RX IP 250 OP 636: Performed by: EMERGENCY MEDICINE

## 2025-05-21 PROCEDURE — 93005 ELECTROCARDIOGRAM TRACING: CPT

## 2025-05-21 PROCEDURE — 250N000013 HC RX MED GY IP 250 OP 250 PS 637: Performed by: EMERGENCY MEDICINE

## 2025-05-21 PROCEDURE — 74177 CT ABD & PELVIS W/CONTRAST: CPT

## 2025-05-21 PROCEDURE — 1111F DSCHRG MED/CURRENT MED MERGE: CPT | Mod: 93 | Performed by: NURSE PRACTITIONER

## 2025-05-21 PROCEDURE — 36415 COLL VENOUS BLD VENIPUNCTURE: CPT | Performed by: EMERGENCY MEDICINE

## 2025-05-21 PROCEDURE — 87637 SARSCOV2&INF A&B&RSV AMP PRB: CPT | Performed by: EMERGENCY MEDICINE

## 2025-05-21 PROCEDURE — 99285 EMERGENCY DEPT VISIT HI MDM: CPT | Mod: 25

## 2025-05-21 PROCEDURE — 83690 ASSAY OF LIPASE: CPT | Performed by: EMERGENCY MEDICINE

## 2025-05-21 PROCEDURE — 250N000009 HC RX 250: Performed by: EMERGENCY MEDICINE

## 2025-05-21 PROCEDURE — 96365 THER/PROPH/DIAG IV INF INIT: CPT | Mod: 59

## 2025-05-21 PROCEDURE — 98013 SYNCH AUDIO-ONLY EST LOW 20: CPT | Performed by: NURSE PRACTITIONER

## 2025-05-21 PROCEDURE — 71046 X-RAY EXAM CHEST 2 VIEWS: CPT

## 2025-05-21 PROCEDURE — 82040 ASSAY OF SERUM ALBUMIN: CPT | Performed by: EMERGENCY MEDICINE

## 2025-05-21 PROCEDURE — 81003 URINALYSIS AUTO W/O SCOPE: CPT | Performed by: EMERGENCY MEDICINE

## 2025-05-21 PROCEDURE — 85004 AUTOMATED DIFF WBC COUNT: CPT | Performed by: EMERGENCY MEDICINE

## 2025-05-21 PROCEDURE — 87086 URINE CULTURE/COLONY COUNT: CPT | Performed by: EMERGENCY MEDICINE

## 2025-05-21 PROCEDURE — 96366 THER/PROPH/DIAG IV INF ADDON: CPT

## 2025-05-21 RX ORDER — ACETAMINOPHEN 325 MG/1
650 TABLET ORAL EVERY 4 HOURS PRN
Status: DISCONTINUED | OUTPATIENT
Start: 2025-05-21 | End: 2025-05-22 | Stop reason: HOSPADM

## 2025-05-21 RX ORDER — CYCLOBENZAPRINE HCL 5 MG
5 TABLET ORAL 3 TIMES DAILY PRN
Status: DISCONTINUED | OUTPATIENT
Start: 2025-05-21 | End: 2025-05-22 | Stop reason: HOSPADM

## 2025-05-21 RX ORDER — CEFDINIR 300 MG/1
300 CAPSULE ORAL EVERY 12 HOURS SCHEDULED
Status: DISCONTINUED | OUTPATIENT
Start: 2025-05-22 | End: 2025-05-22 | Stop reason: HOSPADM

## 2025-05-21 RX ORDER — BUTALBITAL, ASPIRIN, AND CAFFEINE 325; 50; 40 MG/1; MG/1; MG/1
1 CAPSULE ORAL EVERY 8 HOURS PRN
Status: DISCONTINUED | OUTPATIENT
Start: 2025-05-21 | End: 2025-05-22 | Stop reason: HOSPADM

## 2025-05-21 RX ORDER — CEFTRIAXONE 1 G/1
1 INJECTION, POWDER, FOR SOLUTION INTRAMUSCULAR; INTRAVENOUS ONCE
Status: COMPLETED | OUTPATIENT
Start: 2025-05-21 | End: 2025-05-21

## 2025-05-21 RX ORDER — IOPAMIDOL 755 MG/ML
100 INJECTION, SOLUTION INTRAVASCULAR ONCE
Status: COMPLETED | OUTPATIENT
Start: 2025-05-21 | End: 2025-05-21

## 2025-05-21 RX ORDER — FUROSEMIDE 40 MG/1
40 TABLET ORAL DAILY
Status: DISCONTINUED | OUTPATIENT
Start: 2025-05-22 | End: 2025-05-22 | Stop reason: HOSPADM

## 2025-05-21 RX ORDER — METOPROLOL SUCCINATE 25 MG/1
25 TABLET, EXTENDED RELEASE ORAL DAILY
Status: DISCONTINUED | OUTPATIENT
Start: 2025-05-22 | End: 2025-05-22 | Stop reason: HOSPADM

## 2025-05-21 RX ORDER — GLIPIZIDE 2.5 MG/1
2.5 TABLET, EXTENDED RELEASE ORAL 2 TIMES DAILY
Status: DISCONTINUED | OUTPATIENT
Start: 2025-05-22 | End: 2025-05-22 | Stop reason: HOSPADM

## 2025-05-21 RX ORDER — PANTOPRAZOLE SODIUM 40 MG/1
40 TABLET, DELAYED RELEASE ORAL DAILY
Status: DISCONTINUED | OUTPATIENT
Start: 2025-05-22 | End: 2025-05-22 | Stop reason: HOSPADM

## 2025-05-21 RX ORDER — LORAZEPAM 1 MG/1
1 TABLET ORAL EVERY 8 HOURS PRN
Status: DISCONTINUED | OUTPATIENT
Start: 2025-05-21 | End: 2025-05-22 | Stop reason: HOSPADM

## 2025-05-21 RX ADMIN — IOPAMIDOL 100 ML: 755 INJECTION, SOLUTION INTRAVENOUS at 18:16

## 2025-05-21 RX ADMIN — APIXABAN 5 MG: 5 TABLET, FILM COATED ORAL at 22:08

## 2025-05-21 RX ADMIN — SODIUM CHLORIDE 65 ML: 9 INJECTION, SOLUTION INTRAVENOUS at 18:16

## 2025-05-21 RX ADMIN — CEFTRIAXONE 1 G: 1 INJECTION, POWDER, FOR SOLUTION INTRAMUSCULAR; INTRAVENOUS at 20:17

## 2025-05-21 RX ADMIN — DICLOFENAC SODIUM 2 G: 10 GEL TOPICAL at 22:26

## 2025-05-21 ASSESSMENT — ACTIVITIES OF DAILY LIVING (ADL)
ADLS_ACUITY_SCORE: 58
ADLS_ACUITY_SCORE: 62
ADLS_ACUITY_SCORE: 62
ADLS_ACUITY_SCORE: 58
ADLS_ACUITY_SCORE: 58

## 2025-05-21 NOTE — ED NOTES
Received call from Roya Garza (NP) at [facility].  She states that the pt was reffered to Care coordination for increase of help at home.  She states that she needs home health vs Assisted living and is hoping care coordinator/SW can look into this while the pt is here.

## 2025-05-21 NOTE — PROGRESS NOTES
Savanna is a 84 year old who is being evaluated via a billable telephone visit.    What phone number would you like to be contacted at? [phone]  How would you like to obtain your AVS? MyChart  Originating Location (pt. Location): Home    Distant Location (provider location):  On-site  Telephone visit completed due to the patient did not have access to video, while the distant provider did.      Assessment & Plan     (R50.9) Fever, unspecified fever cause  (primary encounter diagnosis)  Comment: Acute.  To [facility] for further evaluation and treatment.     (R05.1) Acute cough  Comment: Acute.      (R07.89) Chest heaviness  Comment: Acute.    (R53.83) Other fatigue  Comment: Acute     (R35.0) Urinary frequency  Comment: Acute.  Persistent.         Patient Instructions   Take your meds as prescribed.    Scripts / refills sent to your pharmacy.     Recommend [facility] for evaluation and treatment.      Call or MyChart as needed as well.          The longitudinal plan of care for the diagnosis(es)/condition(s) as documented were addressed during this visit. Due to the added complexity in care, I will continue to support Savanna in the subsequent management and with ongoing continuity of care.      MED REC REQUIRED  Post Medication Reconciliation Status: discharge medications reconciled, continue medications without change        Subjective   Savanna is a 84 year old, presenting for the following health issues:  Abdominal Pain (Lower, pelvic pain), Cough (Hard to take a deep breath ), and Referral (Care coordination-needs help with housing?)        5/21/2025     9:51 AM   Additional Questions   Roomed by Kasey BRAR    Acute Illness  Acute illness concerns: URI  Onset/Duration: 2 days  Symptoms:  Fever: YES-100.3.  Temp was yesterday.  Per home care nurse.   Chills/Sweats: YES  Headache (location?): YES  Sinus Pressure: No  Conjunctivitis:  No  Ear Pain: YES: bilateral  Rhinorrhea: No  Congestion: No  Sore  Throat: YES  Cough: YES w/ heavy chest  Wheeze: No  Decreased Appetite: YES  Nausea: No  Vomiting: No  Diarrhea: No  Dysuria/Freq.: YES  Dysuria or Hematuria: No  Fatigue/Achiness: YES- slept about 22 hrs yesterday, pinched nerve in neck.  Decreased appetite.   Sick/Strep Exposure: No  Therapies tried and outcome: Keflex and Valium, Tylenol     Genitourinary - Female  Onset/Duration: 2 days  Description:   Painful urination (Dysuria): No           Frequency: YES  Blood in urine (Hematuria): No  Delay in urine (Hesitency): No, is having incontinence   Intensity: moderate  Progression of Symptoms:  worsening  Accompanying Signs & Symptoms:  Fever/chills: YES  Flank pain: No  Nausea and vomiting: No  Vaginal symptoms: bumps around the labia  Abdominal/Pelvic Pain: YES  History:   History of frequent UTI s: YES  History of kidney stones: No  Sexually Active: No  Possibility of pregnancy: No  Precipitating or alleviating factors: None  Therapies tried and outcome: Keflex, Valium    Wonders about bringing in a urine today for UA/UC.  Daughter will bring it in.         Review of Systems  Constitutional, neuro, ENT, endocrine, pulmonary, cardiac, gastrointestinal, genitourinary, musculoskeletal, integument and psychiatric systems are negative, except as otherwise noted.        Objective       Vitals:  No vitals were obtained today due to virtual visit.      Physical Exam   General:  Good conversation.  Easy breathing.  Respiratory: No audible wheeze, cough, or shortness of breath // Psychiatric:  Appropriate affect, tone, and pace of words.     Discussed all her symptoms and concerns.  Recommended [facility] today.  Daughter or family member should bring her in today.  If unable to get a ride, then she will need to call 911 for ambulance transport to [facility].  She is in agreement with this plan.            Phone call duration: 20 minutes      Signed Electronically by: BATSHEVA Joel CNP

## 2025-05-21 NOTE — TELEPHONE ENCOUNTER
I called [facility] for discuss case with ER Nurse.  Note is in system to review.  I also wanted to relay the message about looking at housing options; ? assisted living.  She feels like she may need more help at home.  Nurse took note and will relay message.     BATSHEVA Joel CNP

## 2025-05-21 NOTE — PATIENT INSTRUCTIONS
Take your meds as prescribed.    Scripts / refills sent to your pharmacy.     Recommend [facility] for evaluation and treatment.      Call or MyChart as needed as well.

## 2025-05-21 NOTE — ED TRIAGE NOTES
Patient reports fever for the last 2 days, dizziness, possible bladder infection, headache, fatigue, and other symptoms as well.

## 2025-05-22 ENCOUNTER — TRANSFERRED RECORDS (OUTPATIENT)
Dept: HEALTH INFORMATION MANAGEMENT | Facility: CLINIC | Age: 85
End: 2025-05-22

## 2025-05-22 ENCOUNTER — APPOINTMENT (OUTPATIENT)
Dept: PHYSICAL THERAPY | Facility: CLINIC | Age: 85
End: 2025-05-22
Attending: EMERGENCY MEDICINE
Payer: COMMERCIAL

## 2025-05-22 VITALS
BODY MASS INDEX: 36.53 KG/M2 | TEMPERATURE: 98.1 F | WEIGHT: 241 LBS | HEIGHT: 68 IN | HEART RATE: 61 BPM | DIASTOLIC BLOOD PRESSURE: 67 MMHG | OXYGEN SATURATION: 94 % | RESPIRATION RATE: 18 BRPM | SYSTOLIC BLOOD PRESSURE: 140 MMHG

## 2025-05-22 LAB
ATRIAL RATE - MUSE: NORMAL BPM
BACTERIA UR CULT: ABNORMAL
DIASTOLIC BLOOD PRESSURE - MUSE: NORMAL MMHG
GLUCOSE BLDC GLUCOMTR-MCNC: 186 MG/DL (ref 70–99)
INTERPRETATION ECG - MUSE: NORMAL
P AXIS - MUSE: NORMAL DEGREES
PR INTERVAL - MUSE: NORMAL MS
QRS DURATION - MUSE: 114 MS
QT - MUSE: 434 MS
QTC - MUSE: 447 MS
R AXIS - MUSE: -30 DEGREES
SYSTOLIC BLOOD PRESSURE - MUSE: NORMAL MMHG
T AXIS - MUSE: -18 DEGREES
VENTRICULAR RATE- MUSE: 64 BPM

## 2025-05-22 PROCEDURE — 250N000013 HC RX MED GY IP 250 OP 250 PS 637: Performed by: EMERGENCY MEDICINE

## 2025-05-22 PROCEDURE — 97161 PT EVAL LOW COMPLEX 20 MIN: CPT | Mod: GP | Performed by: PHYSICAL THERAPIST

## 2025-05-22 PROCEDURE — 82962 GLUCOSE BLOOD TEST: CPT

## 2025-05-22 RX ORDER — CEFDINIR 300 MG/1
300 CAPSULE ORAL 2 TIMES DAILY
Status: SHIPPED
Start: 2025-05-22 | End: 2025-05-29

## 2025-05-22 RX ORDER — DEXTROSE MONOHYDRATE 25 G/50ML
25-50 INJECTION, SOLUTION INTRAVENOUS
Status: DISCONTINUED | OUTPATIENT
Start: 2025-05-22 | End: 2025-05-22 | Stop reason: HOSPADM

## 2025-05-22 RX ORDER — NICOTINE POLACRILEX 4 MG
15-30 LOZENGE BUCCAL
Status: DISCONTINUED | OUTPATIENT
Start: 2025-05-22 | End: 2025-05-22 | Stop reason: HOSPADM

## 2025-05-22 RX ADMIN — DICLOFENAC SODIUM 2 G: 10 GEL TOPICAL at 14:04

## 2025-05-22 RX ADMIN — CEFDINIR 300 MG: 300 CAPSULE ORAL at 07:29

## 2025-05-22 RX ADMIN — PANTOPRAZOLE SODIUM 40 MG: 40 TABLET, DELAYED RELEASE ORAL at 07:29

## 2025-05-22 RX ADMIN — DICLOFENAC SODIUM 2 G: 10 GEL TOPICAL at 07:32

## 2025-05-22 RX ADMIN — CHOLECALCIFEROL TAB 10 MCG (400 UNIT) 10 MCG: 10 TAB at 07:29

## 2025-05-22 RX ADMIN — GLIPIZIDE 2.5 MG: 2.5 TABLET, EXTENDED RELEASE ORAL at 07:29

## 2025-05-22 RX ADMIN — CYCLOBENZAPRINE HYDROCHLORIDE 5 MG: 5 TABLET, FILM COATED ORAL at 00:06

## 2025-05-22 RX ADMIN — APIXABAN 5 MG: 5 TABLET, FILM COATED ORAL at 07:29

## 2025-05-22 RX ADMIN — METOPROLOL SUCCINATE 25 MG: 25 TABLET, EXTENDED RELEASE ORAL at 07:29

## 2025-05-22 RX ADMIN — Medication 3 MG: at 00:06

## 2025-05-22 RX ADMIN — FUROSEMIDE 40 MG: 40 TABLET ORAL at 07:29

## 2025-05-22 RX ADMIN — ACETAMINOPHEN 650 MG: 325 TABLET, FILM COATED ORAL at 14:03

## 2025-05-22 ASSESSMENT — ACTIVITIES OF DAILY LIVING (ADL)
ADLS_ACUITY_SCORE: 62

## 2025-05-22 NOTE — CONSULTS
Care Management [facility] Outpatient Consult:    Care management consulted by ED provider for prison assessment.  CM spoke with provider to discuss prison case. Provider has confirmed patient is medically stable for discharge.    Background information (reason for presentation to ED): Savanna Rehman is a 84 year old female presents with primary concerns of diffuse lower abdominal pain and concern of bladder infection. Pt reports difficulty with caring for herself due to deconditioning and hopes for increased services/TCU/eventual FPC.     Provider recommended discharge disposition:  TCU vs home    Resources needed for discharge: PT eval-possible TCU referrals vs transport resources to get home    Estimated timeline for needed resources: 0-4 hrs (ambulatory referral, county contacts, non-urgent home care), 4-24 hrs (critical home care need),  >24 hrs (new TCU/LTC, decision maker challenges)    Barriers to discharge: PT eval    CM met/spoke with patient/family at bedside/via phone.  Discussed patient is medically stable for discharge from ED & medical insurance will likely not cover the hospital stay.  Discussed recommended discharge disposition and resources needed, included noted barriers.  Provided the following resources:       Discussed above with ED provider.      Next steps:      SW met with pt at the bedside to assess for prison cares. Pt reports that she lives at [facility] in [place] and shares that it has been increasingly difficult to care for herself. She has a EW pending and is on the waitlist for [facility] which is a 2 year waitlist. She does not want Home health Care services. Pt is interested in TCU if she qualifies as she feels that she has been weaker than normal. She also request SW sent referrals to facilities that accept EW. She would like transportation resources. SW explained that she will need a PT evaluation to determine if she is TCU  appropriate, SW will gather the other requested resources and meet with the evaluation is in to determine next steps.     Arianne Doshi/ANA LUISA Carlson, SIENA  Inpatient Care Coordination  Emergency Room /[place]  [phone]    Arianne Doshi, SW

## 2025-05-22 NOTE — PROGRESS NOTES
Received call that [facility] has waiver openings and request a referral for the pt. They will follow up with the pt. Referral sent.     Arianne Doshi/ANA LUISA Carlson, [facility]  Inpatient Care Coordination  Emergency Room /[place]  [phone]

## 2025-05-22 NOTE — PROGRESS NOTES
05/22/25 1056   Appointment Info   Signing Clinician's Name / Credentials (PT) Zeke Pennington DPT   Self-Care   Usual Activity Tolerance moderate   Current Activity Tolerance fair   Equipment Currently Used at Home walker, rolling;other (see comments);grab bar, tub/shower;shower chair   General Information   Onset of Illness/Injury or Date of Surgery 05/21/25   Referring Physician Florence Black MD   Patient/Family Therapy Goals Statement (PT) To improve mobility   Pertinent History of Current Problem (include personal factors and/or comorbidities that impact the POC) Per review, pt is an 84 year old female is a 84 year old female presents with primary concerns of diffuse lower abdominal pain and concern of bladder infection.  Patient reports she has had lower abdominal discomfort for the last 2-3 weeks reminiscent of prior UTIs.   Existing Precautions/Restrictions fall   Cognition   Affect/Mental Status (Cognition) WFL   Orientation Status (Cognition) oriented x 4   Follows Commands (Cognition) WFL   Pain Assessment   Patient Currently in Pain No   Posture    Posture Forward head position;Protracted shoulders   Range of Motion (ROM)   ROM Comment slight decreased cervical ROM   Strength (Manual Muscle Testing)   Strength (Manual Muscle Testing) Deficits observed during functional mobility   Strength Comments decreased activity tolerance; able to complete B SLR   Bed Mobility   Comment, (Bed Mobility) CGA sit to supine   Transfers   Comment, (Transfers) CGA sit <> stand with 4ww from toilet, recliner chair   Gait/Stairs (Locomotion)   Distance in Feet (Gait) 25   Pattern (Gait) step-through   Deviations/Abnormal Patterns (Gait) base of support, wide;gait speed decreased;stride length decreased   Comment, (Gait/Stairs) CGA with 4ww   Balance   Balance Comments good sitting; fair+ standing with 4ww   Coordination   Coordination no deficits were identified   Coordination Comments noted tremors,  "\"shakiness\" with activity   Clinical Impression   Criteria for Skilled Therapeutic Intervention Evaluation only   PT Diagnosis (PT) decreased functional mobility   Influenced by the following impairments decreased balance, impaired activity tolerance   Functional limitations due to impairments decreased transfers, ambulation, bed mobility   Clinical Presentation (PT Evaluation Complexity) stable   Clinical Presentation Rationale Clinical judgement   Clinical Decision Making (Complexity) low complexity   Planned Therapy Interventions (PT) balance training;bed mobility training;gait training;home exercise program;neuromuscular re-education;patient/family education;postural re-education;ROM (range of motion);strengthening;stretching;transfer training;progressive activity/exercise   Risk & Benefits of therapy have been explained evaluation/treatment results reviewed;care plan/treatment goals reviewed;risks/benefits reviewed;current/potential barriers reviewed;participants voiced agreement with care plan;participants included;patient   Clinical Impression Comments No treatment initiated.   PT Total Evaluation Time   PT [place], Low Complexity Minutes ([id number]) 18   PT Discharge Planning   PT Plan progress ind with transfers, inc ambulation distance as able   PT Discharge Recommendation (DC Rec) Transitional Care Facility   PT Rationale for DC Rec Pt is below baseline mobility with decreased activity tolerance, balance compared to previous evaluation performed by writer. Pt lives alone in ILF. Pt currently Ax1 with walker and would benefit from continued PT in TCU setting prior to return home. Pt ultimately will require more supportive living environment to assist with daily needs.   PT Brief overview of current status Ax1 with 4ww   PT Total Distance Amb During Session (feet) 25   Physical Therapy Time and Intention   Total Session Time (sum of timed and untimed services) 18       "

## 2025-05-22 NOTE — ED PROVIDER NOTES
Patient was seen by social work who recommends TCU  TCU orders done by myself, waiting to see which one she is going to     Florence Black MD  05/22/25 3554

## 2025-05-22 NOTE — ED PROVIDER NOTES
"  Emergency Department Note      History of Present Illness     Chief Complaint   Multiple      HPI     Savanna Rehman is a 84 year old female presents with primary concerns of diffuse lower abdominal pain and concern of bladder infection.  Patient reports she has had lower abdominal discomfort for the last 2-3 weeks reminiscent of prior UTIs.  She has noted urinary frequency but no dysuria or hematuria.  She denies back pain, fever, nausea or vomiting.  She also reports recurrent of left-sided neck pain that was previously attributed to \"pinched nerve.\"  She states these are the only 2 reasons why she is in the emergency department today.  Despite this report, she told triage nurse that she has had a fever for 2 days, dizziness, headache and fatigue.  She called primary care physician and also reported cough, chest discomfort and dyspnea.  She denies these complaints to me.  Patient is a poor historian limiting history.    Independent Historian   None    Review of External Notes   I reviewed discharge summary from 4/26/2025 which patient was mated for acute neck pain and muscle spasm, tension headache.  She had UTI with acute kidney injury.  She has chronic ITP.  She was seen by OT and PT.  They deemed her appropriate for discharge.  They did not recommend TCU.  Patient was reluctant and requested TCU placement however after evaluation by 2 different therapist and counseling, she agreed to discharge home.    Past Medical History     Medical History and Problem List   Past Medical History:   Diagnosis Date    Abnormal WBC count 04/12/2024    Acute encephalopathy 09/21/2023    Acute posthemorrhagic anemia 06/19/2023    Adjustment disorder with anxiety 11/28/2022    Adjustment disorder with anxious mood 04/12/2024    Anemia     Anemia due to blood loss, acute 06/13/2023    Anticoagulated on Coumadin 04/12/2024    Atrial fibrillation (H)     Atrial fibrillation, unspecified type (H) 06/26/2023    Bony pelvic pain " 07/06/2022    C. difficile colitis, recurrent -- she needs to take Vanco 125 bid anytime she is on a different Abx  03/18/2022    CAD (coronary artery disease)     Candidiasis of skin 08/13/2019    Chest pain, unspecified type 06/13/2023    Chronic diarrhea 04/12/2024    Chronic pain     Colonic diverticular abscess 04/01/2024    Confusion associated with infection 09/21/2023    Congestive heart failure (H)     Contusion of lower back and pelvis, subsequent encounter 06/26/2023    Contusion of right thigh, subsequent encounter 06/19/2023    Degenerative disk disease     Diabetes (H)     Diarrhea 08/28/2023    Diverticulitis 04/01/2024    Diverticulitis of large intestine with abscess, unspecified bleeding status 04/12/2024    Fibromyalgia     Gastro-oesophageal reflux disease     Gout     Hematoma of buttock 04/01/2024    Hiatal hernia     Hypervolemia, unspecified hypervolemia type 02/22/2024    Iron deficiency anemia 11/19/2014    Long term current use of anticoagulant therapy 06/26/2023    Mumps     Neuropathy     Noninfectious gastroenteritis 08/14/2015    CRISTIANA (obstructive sleep apnea) - CPAP     Palpitations     Pernicious anemia     Rash and nonspecific skin eruption 04/15/2024    Right-sided chest wall pain 06/19/2023    Skin yeast infection 06/26/2023    Sleep apnea     Subtherapeutic international normalized ratio (INR) 06/13/2023    Traumatic hematoma of buttock, subsequent encounter 06/13/2023    Urinary incontinence     Vision loss of right eye 02/11/2025    Vitamin D deficiency        Medications   apixaban ANTICOAGULANT (ELIQUIS) 5 MG tablet  butalbital-aspirin-caffeine (FIORINAL) -40 MG capsule  cholecalciferol (VITAMIN D3) 10 mcg (400 units) TABS tablet  cyclobenzaprine (FLEXERIL) 5 MG tablet  diclofenac (VOLTAREN) 1 % topical gel  EPINEPHrine (ANY BX GENERIC EQUIV) 0.3 MG/0.3ML injection 2-pack  furosemide (LASIX) 40 MG tablet  glipiZIDE (GLUCOTROL XL) 2.5 MG 24 hr tablet  metoprolol succinate  "ER (TOPROL XL) 25 MG 24 hr tablet  omeprazole (PRILOSEC) 20 MG DR capsule  blood glucose (NO BRAND SPECIFIED) test strip  blood glucose monitoring (NO BRAND SPECIFIED) meter device kit  thin (NO BRAND SPECIFIED) lancets        Surgical History   Past Surgical History:   Procedure Laterality Date    APPENDECTOMY      CHOLECYSTECTOMY      COLONOSCOPY  3/15/2011    COLONOSCOPY N/A 3/15/2023    Procedure: COLONOSCOPY, WITH POLYPECTOMY AND BIOPSY;  Surgeon: Maci Coronel MD;  Location:  GI    COLONOSCOPY N/A 3/15/2023    Procedure: COLONOSCOPY, FLEXIBLE, WITH LESION REMOVAL USING SNARE;  Surgeon: Maci Coronel MD;  Location:  GI    CORONARY ANGIOGRAPHY ADULT ORDER      CYSTOSCOPY, BIOPSY BLADDER, COMBINED N/A 7/13/2020    Procedure: CYSTOSCOPY, WITH BLADDER BIOPSY;  Surgeon: Deisy Wilson MD;  Location: [facility] OR    EP PACEMAKER DEVICE & LEAD IMPLANT- RIGHT ATRIAL & RIGHT VENTRICULAR Left 4/5/2024    Procedure: Pacemaker Device & Lead Implant - Right Atrial & Right Ventricular;  Surgeon: Raul Plunkett MD;  Location:  HEART CARDIAC CATH LAB    ESOPHAGOSCOPY, GASTROSCOPY, DUODENOSCOPY (EGD), COMBINED N/A 8/12/2024    Procedure: Esophagogastroduodenoscopy;  Surgeon: Mohan Mcguire MD;  Location: [facility] OR    HEART CATH LEFT HEART CATH  12/30/16    medication management    HYSTERECTOMY TOTAL ABDOMINAL      Knee replacement NOS Left     LAPAROSCOPIC NISSEN FUNDOPLICATION N/A 2/4/2015    Procedure: LAPAROSCOPIC NISSEN FUNDOPLICATION;  Surgeon: Armando Ansari MD;  Location:  OR    TONSILLECTOMY      TRANSPOSITION ULNAR NERVE (ELBOW)         Physical Exam     Patient Vitals for the past 24 hrs:   BP Temp Temp src Pulse Resp SpO2 Height Weight   05/21/25 1417 129/65 98.1  F (36.7  C) Temporal 79 18 98 % 1.727 m (5' 8\") 109.3 kg (241 lb)     Physical Exam      HEENT:    Oropharynx is moist  Eyes:    Conjunctiva normal  Neck:     Supple, no meningismus.     CV:     Regular rate, irregular rhythm.      No " murmurs, rubs or gallops.     No lower extremity edema.  PULM:    Clear to auscultation bilateral.       No respiratory distress.      Good air exchange.     No rales or wheezing.     No stridor.  ABD:    Soft, non-distended.       Mild diffuse lower tenderness.     Bowel sounds normal.     No pulsatile masses.       No rebound, guarding or rigidity.     No CVA tenderness.   MSK:     No gross deformity to all four extremities.   LYMPH:   No cervical lymphadenopathy.  NEURO:   Alert & O x 3   Speech is clear with no aphasia.     Strength is 5/5 in all 4 extremities.  Sensation is intact.     No tremor.  Skin:    Warm, dry and intact.    Psych:    Mood is good and affect is appropriate.      Diagnostics     Lab Results   Labs Ordered and Resulted from Time of ED Arrival to Time of ED Departure   COMPREHENSIVE METABOLIC PANEL - Abnormal       Result Value    Sodium 137      Potassium 3.7      Carbon Dioxide (CO2) 26      Anion Gap 13      Urea Nitrogen 22.4      Creatinine 1.17 (*)     GFR Estimate 46 (*)     Calcium 9.2      Chloride 98      Glucose 134 (*)     Alkaline Phosphatase 129      AST 35      ALT 22      Protein Total 7.1      Albumin 3.7      Bilirubin Total 2.1 (*)    LIPASE - Abnormal    Lipase 6 (*)    ROUTINE UA WITH MICROSCOPIC REFLEX TO CULTURE - Abnormal    Color Urine Yellow      Appearance Urine Cloudy (*)     Glucose Urine Negative      Bilirubin Urine Negative      Ketones Urine Negative      Specific Gravity Urine 1.025      Blood Urine Moderate (*)     pH Urine 7.0      Protein Albumin Urine 30 (*)     Urobilinogen Urine 6.0 (*)     Nitrite Urine Negative      Leukocyte Esterase Urine Large (*)     Bacteria Urine Many (*)     WBC Clumps Urine None Seen      Budding Yeast Urine None Seen      Mucus Urine Present (*)     RBC Urine 41 (*)     WBC Urine >182 (*)    TROPONIN T, HIGH SENSITIVITY - Abnormal    Troponin T, High Sensitivity 22 (*)    CBC WITH PLATELETS AND DIFFERENTIAL - Abnormal    WBC  Count 5.0      RBC Count 4.08      Hemoglobin 12.8      Hematocrit 37.4      MCV 92      MCH 31.4      MCHC 34.2      RDW 13.3      Platelet Count 91 (*)     % Neutrophils 53      % Lymphocytes 29      % Monocytes 14      % Eosinophils 3      % Basophils 1      % Immature Granulocytes 0      NRBCs per 100 WBC 0      Absolute Neutrophils 2.6      Absolute Lymphocytes 1.4      Absolute Monocytes 0.7      Absolute Eosinophils 0.2      Absolute Basophils 0.0      Absolute Immature Granulocytes 0.0      Absolute NRBCs 0.0     TROPONIN T, HIGH SENSITIVITY - Abnormal    Troponin T, High Sensitivity 20 (*)    INFLUENZA A/B, RSV AND SARS-COV2 PCR - Normal    Influenza A PCR Negative      Influenza B PCR Negative      RSV PCR Negative      SARS CoV2 PCR Negative     URINE CULTURE       Imaging   CT Abdomen Pelvis w Contrast   Final Result   IMPRESSION:    1.  Diffuse cervical ventral wall thickening the bladder may represent cystitis. Correlate with urinalysis.   2.  There is also a possible small periurethral cyst near the urethral orifice.   3.  Cirrhosis with manifestations of portal hypertension.      Chest XR,  PA & LAT   Final Result   IMPRESSION:       Left subclavian approach pacemaker lead extends to the right ventricle. Unchanged prominent left ventricular heart border. Mild aortic atheromatous calcifications.      Symmetric lung inflation. There are no airspace opacities. No interlobular septal thickening or peribronchial thickening around the tali. Diaphragm curvature is preserved. No pleural effusion or pneumothorax.          EKG   ECG results from 05/21/25   EKG 12-lead, tracing only     Value    Systolic Blood Pressure     Diastolic Blood Pressure     Ventricular Rate 64    Atrial Rate     LA Interval     QRS Duration 114        QTc 447    P Axis     R AXIS -30    T Axis -18    Interpretation ECG      Atrial fibrillation with frequent ventricular-paced complexes  Left axis deviation  Minimal voltage  criteria for LVH, may be normal variant ( Hettinger product )  Anterior infarct , age undetermined  Abnormal ECG  When compared with ECG of 23-Apr-2025 10:47,  No significant change was found       *Note: Due to a large number of results and/or encounters for the requested time period, some results have not been displayed. A complete set of results can be found in Results Review.         Independent Interpretation   CXR: No pneumothorax or infiltrate.    ED Course      Medications Administered   Medications   cefTRIAXone (ROCEPHIN) 1 g vial to attach to  mL bag for ADULTS or NS 50 mL bag for PEDS (has no administration in time range)   acetaminophen (TYLENOL) tablet 650 mg (has no administration in time range)   LORazepam (ATIVAN) tablet 1 mg (has no administration in time range)   melatonin tablet 3 mg (has no administration in time range)   cefdinir (OMNICEF) capsule 300 mg (has no administration in time range)   iopamidol (ISOVUE-370) solution 100 mL (100 mLs Intravenous $Given 5/21/25 1816)   sodium chloride 0.9 % bag for CT scan flush (65 mLs Intravenous $Given 5/21/25 1816)       Procedures   Procedures     Discussion of Management   None    ED Course        Additional Documentation  None    Medical Decision Making / Diagnosis     CMS Diagnoses: IV Antibiotics given and/or elevated Lactate of 0 and no sepsis note found - Delete this reminder and enter the sepsis note or '.edcms' before signing chart.>>>None    MIPS   None               [facility]   Savanna Rehman is a 84 year old female presents with primary complaints of diffuse lower abdominal pain and urinary frequency concerning for recurrent UTI.  Urinalysis consistent with infection.  CT scan of the abdomen was undertaken to ensure there is no complicated UTI by presence of ureteral stone or additional pathology given the recurrent nature of her UTI.  There are radiographic findings of acute cystitis otherwise CT scan negative for acute pathology.   Patient given ceftriaxone.  She will be transitioned to cefdinir based on prior urine cultures and tolerance to cephalosporins.  Patient had no additional complaints to me other than recurrence with left-sided neck pain for which she was recently hospitalized and diagnosed with musculoskeletal neck pain.      There were a plethora of other complaints that she made to primary care physician telephone encounter and triage nurse that she denied to me.  Patient reports that her did not daughter does not want her to go home.  Patient has many complaints about her current living situation. She is at home in an independent senior living.  She has some home health assistance but feels like she needs increased assistance.  No indication for hospital admission at this time.  There were lengthy discussions which patient has difficulty verbalizing exactly what she wants us to accomplish but ultimately sounds like she feels that she is not well cared for at her home and needs placement to TCU versus increased home health services.  Patient will be made assisted care with social work to evaluate tomorrow.    Disposition   intermediate care    Diagnosis     ICD-10-CM    1. Acute cystitis with hematuria  N30.01       2. Generalized weakness  R53.1            Discharge Medications   New Prescriptions    No medications on file         MD Yang Trotter Jeremiah R, MD  05/21/25 2025

## 2025-05-22 NOTE — PROGRESS NOTES
Pt accepted to [facility] home for TCU. Met with pt at the bedside who accepts placement. SW will call [facility] transport at 1640, [facility] cannot have pt arrive until after 1700. PAS completed. Will fax orders once completed.     SARAVANAN provided list to the pt of ALFs that accept EW with phone numbers. Provided transportation resources.     Addendum    SW called for transport, they will be here within the hour. Orders faxed. [facility] aware and agreeable.     Arianne Doshi/ANA LUISA Carlson, [facility]  Inpatient Care Coordination  Emergency Room /Float  [phone]

## 2025-05-22 NOTE — PHARMACY-ADMISSION MEDICATION HISTORY
Pharmacist Admission Medication History    Admission medication history is complete. The information provided in this note is only as accurate as the sources available at the time of the update.    Information Source(s): Patient via phone and [phone]    Pertinent Information: last took all meds yesterday, none today PTA    Changes made to PTA medication list:  Added: None  Deleted: None  Changed: None    Allergies reviewed with patient and updates made in EHR: yes    Medication History Completed By: Chinmay Ricci [facility] 5/21/2025 8:14 PM    PTA Med List   Medication Sig Last Dose/Taking    apixaban ANTICOAGULANT (ELIQUIS) 5 MG tablet Take 1 tablet (5 mg) by mouth 2 times daily. 5/20/2025    butalbital-aspirin-caffeine (FIORINAL) -40 MG capsule Take 1 capsule by mouth every 8 hours as needed for headaches. Taking As Needed    cholecalciferol (VITAMIN D3) 10 mcg (400 units) TABS tablet Take 10 mcg by mouth daily. 5/20/2025    cyclobenzaprine (FLEXERIL) 5 MG tablet Take 1 tablet (5 mg) by mouth 3 times daily as needed for muscle spasms. Taking As Needed    diclofenac (VOLTAREN) 1 % topical gel Apply 2 g topically 4 times daily. 5/20/2025    EPINEPHrine (ANY BX GENERIC EQUIV) 0.3 MG/0.3ML injection 2-pack Inject 0.3 mLs (0.3 mg) into the muscle as needed for anaphylaxis May repeat one time in 5-15 minutes if response to initial dose is inadequate. Taking As Needed    furosemide (LASIX) 40 MG tablet Take 1 tablet by mouth once daily 5/20/2025    glipiZIDE (GLUCOTROL XL) 2.5 MG 24 hr tablet Take 1 tablet by mouth twice daily 5/20/2025    metoprolol succinate ER (TOPROL XL) 25 MG 24 hr tablet Take 1 tablet (25 mg) by mouth daily. 5/20/2025    omeprazole (PRILOSEC) 20 MG DR capsule Take 20 mg by mouth daily. 5/20/2025

## 2025-05-27 ENCOUNTER — TRANSITIONAL CARE UNIT VISIT (OUTPATIENT)
Dept: GERIATRICS | Facility: CLINIC | Age: 85
End: 2025-05-27
Payer: COMMERCIAL

## 2025-05-27 ENCOUNTER — TELEPHONE (OUTPATIENT)
Dept: GERIATRICS | Facility: CLINIC | Age: 85
End: 2025-05-27

## 2025-05-27 ENCOUNTER — PATIENT OUTREACH (OUTPATIENT)
Dept: CARE COORDINATION | Facility: CLINIC | Age: 85
End: 2025-05-27

## 2025-05-27 VITALS
HEIGHT: 68 IN | SYSTOLIC BLOOD PRESSURE: 136 MMHG | OXYGEN SATURATION: 95 % | HEART RATE: 69 BPM | WEIGHT: 231 LBS | RESPIRATION RATE: 18 BRPM | BODY MASS INDEX: 35.01 KG/M2 | DIASTOLIC BLOOD PRESSURE: 81 MMHG | TEMPERATURE: 97.3 F

## 2025-05-27 DIAGNOSIS — N39.0 URINARY TRACT INFECTION WITHOUT HEMATURIA, SITE UNSPECIFIED: Primary | ICD-10-CM

## 2025-05-27 DIAGNOSIS — R41.89 COGNITIVE IMPAIRMENT: ICD-10-CM

## 2025-05-27 DIAGNOSIS — N18.31 TYPE 2 DIABETES MELLITUS WITH STAGE 3A CHRONIC KIDNEY DISEASE, WITHOUT LONG-TERM CURRENT USE OF INSULIN (H): ICD-10-CM

## 2025-05-27 DIAGNOSIS — G47.33 OSA (OBSTRUCTIVE SLEEP APNEA): ICD-10-CM

## 2025-05-27 DIAGNOSIS — M54.2 ACUTE NECK PAIN: ICD-10-CM

## 2025-05-27 DIAGNOSIS — D69.3 IDIOPATHIC THROMBOCYTOPENIC PURPURA (H): ICD-10-CM

## 2025-05-27 DIAGNOSIS — F43.23 ADJUSTMENT DISORDER WITH MIXED ANXIETY AND DEPRESSED MOOD: ICD-10-CM

## 2025-05-27 DIAGNOSIS — I50.32 HYPERTENSIVE HEART DISEASE WITH CHRONIC DIASTOLIC CONGESTIVE HEART FAILURE (H): ICD-10-CM

## 2025-05-27 DIAGNOSIS — R53.81 PHYSICAL DECONDITIONING: ICD-10-CM

## 2025-05-27 DIAGNOSIS — E11.22 TYPE 2 DIABETES MELLITUS WITH STAGE 3A CHRONIC KIDNEY DISEASE, WITHOUT LONG-TERM CURRENT USE OF INSULIN (H): ICD-10-CM

## 2025-05-27 DIAGNOSIS — I48.20 CHRONIC ATRIAL FIBRILLATION (H): ICD-10-CM

## 2025-05-27 DIAGNOSIS — I11.0 HYPERTENSIVE HEART DISEASE WITH CHRONIC DIASTOLIC CONGESTIVE HEART FAILURE (H): ICD-10-CM

## 2025-05-27 DIAGNOSIS — M47.812 SPONDYLOSIS OF CERVICAL SPINE: ICD-10-CM

## 2025-05-27 PROCEDURE — 99310 SBSQ NF CARE HIGH MDM 45: CPT | Performed by: NURSE PRACTITIONER

## 2025-05-27 RX ORDER — ONDANSETRON 4 MG/1
4 TABLET, FILM COATED ORAL EVERY 6 HOURS PRN
COMMUNITY

## 2025-05-27 RX ORDER — LOPERAMIDE HYDROCHLORIDE 2 MG/1
2 TABLET ORAL EVERY 6 HOURS PRN
COMMUNITY

## 2025-05-27 NOTE — PROGRESS NOTES
"Clinic Care Coordination Contact  Care Coordination Transition Communication    Clinical Data: Patient was hospitalized at [facility] from 05/21/25 to 05/22/25 with diagnosis of: \"acute cystitis with hematuria, generalized weakness\".     Assessment: Patient has transitioned to TCU/ARU for short term rehabilitation:    Facility Name: [facility]  Transition Communication:  Notified facility of Primary Care- Care Coordination support via Epic in basket message.    Plan: Care Coordinator will await notification from facility staff informing of patient's discharge plans/needs. Care Coordinator will review chart and outreach to facility staff every 4 weeks and as needed.     Crystal Lin, RN Care Coordinator  [facility], [place], [place]  Email: [email or web address]  Phone: [phone]   "

## 2025-05-27 NOTE — TELEPHONE ENCOUNTER
[facility] Geriatrics Triage Call    Provider: Tonya Haase, APRN CNP  Facility: [facility] Facility Type:  TCU    Caller: Ry   Call Back Number: [phone]    Allergies:    Allergies   Allergen Reactions    Augmented Betamethasone Diprop [Betamethasone] Other (See Comments)     Severe yeast infection    Petrolatum Anaphylaxis and Swelling     Rash and swelling    Shellfish-Derived Products Anaphylaxis     Tongue swelling    Bacitracin      Rash swelling    Bactrim [Sulfamethoxazole-Trimethoprim] Dizziness    Darvon [Propoxyphene] Swelling     Throat closes    Dilaudid [Hydromorphone]      No side effects from fentanyl June 2023    Levaquin [Levofloxacin] Swelling     Tongue swelling    Lidocaine      Facial swelling     Morphine Unknown     Per [facility] 2/23/24    Neomycin Swelling     rash    Neosporin [Neomycin-Polymyxin-Gramicidin] Swelling     rash    Nitrofurantoin      SOB, GI upset,    Oxycodone      Severe itching    Percocet [Oxycodone-Acetaminophen] Unknown    Percodan [Oxycodone-Aspirin]      Severe itching    Polymyxin B     Pramoxine     Tramadol     Vicodin [Hydrocodone-Acetaminophen]      Severe itching      Xarelto [Rivaroxaban]     Adhesive Tape Rash     Band aids     Codeine Rash    Hydrocortisone Rash and Swelling    Other Environmental Allergy Rash     Adhesive tape   Band aids           SBAR:     S-(situation): Today the nurse is calling to report that this afternoon around lunch time the patient starting to not feel well. Crackers and gingerale has been given with minimal relief.     B-(background): Pt did have some watermelon from another resident yesterday and the other resident is having some of the same symptoms today. Pt is on cefdinir since 5/22/25.     A-(assessment): c/o of Chills, small emesis, c/o of nausea, diarrhea x1 today, no abd pain or distention   Vitals: BP:  114/72  P:: 90  R:: 18  SPO2: 94% R/A Temp.:  98.5         R-(recommendations): please advise,  "requesting anti-diarrheal and something for nausea        Telephone encounter sent to:  Tonya Haase, APRN CNP    Please send response/orders to \"Geriatrics Nurse Pool\"    [place] RN      "

## 2025-05-27 NOTE — LETTER
" 5/27/2025      Savanna Rehman  [address]  [facility] [address]        [facility] GERIATR[facility]    Chief Complaint   Patient presents with     RECHECK     HPI:  Savanna Rehman is a 84 year old  (1940), who is being seen today for an episodic care visit at: [facility]) [25]. Today's concern is:   UTI: today on exam patient is resting in bed, states she is not feeling well today, nauseated, 'stomach burns\" she states she took all of her pills this morning on an empty stomach, also states she is having some bladder pain again, denies fever or chills  In therapy walking up to 150 feet using a 4ww with SBA  Neck pain at time of exam patient denies pain or discomfort, no c/o pain noted in therapy notes  HTN/CHF/atrial fib: /70, 136/81, 137/77 with HR  60 range, denies CP, palpitations, SOB, weight on admission was 225lbs current weight is 231.9lbs  DMII: blood sugar range   Cognitive impairment BIMS 15/15 and SBT 4/28, unreliable historian per nursing and therapy notes  Thrombocytopenia see labs    Allergies, and PMH/PSH reviewed in [facility] today.  REVIEW OF SYSTEMS:  10 point ROS of systems including Constitutional, Eyes, Respiratory, Cardiovascular, Gastroenterology, Genitourinary, Integumentary, Musculoskeletal, Psychiatric were all negative except for pertinent positives noted in my HPI.    Objective:   /81   Pulse 69   Temp 97.3  F (36.3  C)   Resp 18   Ht 1.737 m (5' 8.4\")   Wt 104.8 kg (231 lb)   LMP  (LMP Unknown)   SpO2 95%   BMI 34.71 kg/m    GENERAL APPEARANCE:  Alert, in no distress, morbidly obese  ENT:  Mouth and posterior oropharynx normal, moist mucous membranes, normal hearing acuity  EYES:  EOM, conjunctivae, lids, pupils and irises normal, PERRL  RESP:  respiratory effort and palpation of chest normal, lungs clear to auscultation , no respiratory distress  CV:  regular rate and rhythm, no murmur, rub, or gallop, no edema  ABDOMEN:  " normal bowel sounds, soft, nontender, no hepatosplenomegaly or other masses  M/S:   patient resting in bed  SKIN:  Inspection of skin and subcutaneous tissue baseline  NEURO:   speech wnl  PSYCH:  affect and mood normal    Recent labs in [facility] reviewed by me today.  and Most Recent 3 CBC's:  Recent Labs   Lab Test 05/21/25  1530 05/12/25  1216 04/24/25  0456   WBC 5.0 4.9 6.2   HGB 12.8 13.1 11.9   MCV 92 91 91   PLT 91* 136* 100*     Most Recent 3 BMP's:  Recent Labs   Lab Test 05/22/25  1154 05/21/25  1530 05/12/25  1216 04/24/25  2145 04/24/25  0456   NA  --  137 139  --  136   POTASSIUM  --  3.7 4.6  --  4.0   CHLORIDE  --  98 102  --  100   CO2  --  26 26  --  28   BUN  --  22.4 18.5  --  15.0   CR  --  1.17* 0.92  --  0.99*   ANIONGAP  --  13 11  --  8   SAUL  --  9.2 9.5  --  8.7*   * 134* 147*   < > 139*    < > = values in this interval not displayed.       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) spondylosis  cervical  Associated tension headaches from neck pain  Comment: acute/ongoing, no change  Hospitalized 4/23-4/24/25  CT of cervical spine multilevel spondylosis without high grade canal stenosis  Plan: PT and OT, continue fiorinal 50-325mg q 8 hours prn, flexeril 5mg TID prn, voltaren gel  F/u with neurology as recommended during last hospital admission     (I48.20) Chronic atrial fibrillation (H)  (I11.0,  I50.32) Hypertensive heart disease with chronic diastolic congestive heart failure (H)  Comment: acute/ongoing , no change  S/p PPM   Plan: BMP follow, continue apixaban 5mg BID, lasix 40mg QD, toprol xl 25mg QD     (E11.22,  N18.31) Type 2 diabetes mellitus with stage 3a chronic kidney disease, without long-term current  use of insulin (H)  Comment: ongoing, no change  Last HgbA1C 6.6%  Plan: blood sugar monitoring, glipizide xl 2.5mg BID     (R41.89) Cognitive impairment  (F43.23) Adjustment disorder with mixed anxiety and depressed mood  Comment: acute/ongoing, no change  Lives in ILF, feels she is not being well taken care of  Plan: OT evaluation, SW to assist with discharge planning  Offer ACP  BIMS 15/15 and SBT 4/28, recommend further testing  continue Citalopram 10mg QD      ( 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 Pompa last visit 5/12/25 Plt stable     (G47.33) CRISTIANA (obstructive sleep apnea)   Comment: acute/ongoing, no change  Plan: does not use CPAP, monitor SaO2 at rest and with activity    MED REC REQUIRED  Post Medication Reconciliation Status: medication reconcilation previously completed during another office visit      Orders:  No new orders    Total time with patient visit: 47 minutes including discussions about the POC and care coordination with the patient. Greater than 50% of total time spent with counseling and coordinating care due to reviewed nursing and therapy progress notes, medications, POC. Discussed POC, medications and progress in therapy with patient at bedside. .   Electronically signed by: Tonya Lynn Haase, APRN CNP         Sincerely,        Tonya Lynn Haase, APRN CNP    Electronically signed

## 2025-05-27 NOTE — PROGRESS NOTES
"[facility] GERIATRICS    Chief Complaint   Patient presents with    RECHECK     HPI:  Savanna Rehman is a 84 year old  (1940), who is being seen today for an episodic care visit at: [facility] ([facility]) [25]. Today's concern is:   UTI: today on exam patient is resting in bed, states she is not feeling well today, nauseated, 'stomach burns\" she states she took all of her pills this morning on an empty stomach, also states she is having some bladder pain again, denies fever or chills  In therapy walking up to 150 feet using a 4ww with SBA  Neck pain at time of exam patient denies pain or discomfort, no c/o pain noted in therapy notes  HTN/CHF/atrial fib: /70, 136/81, 137/77 with HR  60 range, denies CP, palpitations, SOB, weight on admission was 225lbs current weight is 231.9lbs  DMII: blood sugar range   Cognitive impairment BIMS 15/15 and SBT 4/28, unreliable historian per nursing and therapy notes  Thrombocytopenia see labs    Allergies, and PMH/PSH reviewed in [facility] today.  REVIEW OF SYSTEMS:  10 point ROS of systems including Constitutional, Eyes, Respiratory, Cardiovascular, Gastroenterology, Genitourinary, Integumentary, Musculoskeletal, Psychiatric were all negative except for pertinent positives noted in my HPI.    Objective:   /81   Pulse 69   Temp 97.3  F (36.3  C)   Resp 18   Ht 1.737 m (5' 8.4\")   Wt 104.8 kg (231 lb)   LMP  (LMP Unknown)   SpO2 95%   BMI 34.71 kg/m    GENERAL APPEARANCE:  Alert, in no distress, morbidly obese  ENT:  Mouth and posterior oropharynx normal, moist mucous membranes, normal hearing acuity  EYES:  EOM, conjunctivae, lids, pupils and irises normal, PERRL  RESP:  respiratory effort and palpation of chest normal, lungs clear to auscultation , no respiratory distress  CV:  regular rate and rhythm, no murmur, rub, or gallop, no edema  ABDOMEN:  normal bowel sounds, soft, nontender, no hepatosplenomegaly or other masses  M/S:   patient " resting in bed  SKIN:  Inspection of skin and subcutaneous tissue baseline  NEURO:   speech wnl  PSYCH:  affect and mood normal    Recent labs in [facility] reviewed by me today.  and Most Recent 3 CBC's:  Recent Labs   Lab Test 05/21/25  1530 05/12/25  1216 04/24/25  0456   WBC 5.0 4.9 6.2   HGB 12.8 13.1 11.9   MCV 92 91 91   PLT 91* 136* 100*     Most Recent 3 BMP's:  Recent Labs   Lab Test 05/22/25  1154 05/21/25  1530 05/12/25  1216 04/24/25  2145 04/24/25  0456   NA  --  137 139  --  136   POTASSIUM  --  3.7 4.6  --  4.0   CHLORIDE  --  98 102  --  100   CO2  --  26 26  --  28   BUN  --  22.4 18.5  --  15.0   CR  --  1.17* 0.92  --  0.99*   ANIONGAP  --  13 11  --  8   SAUL  --  9.2 9.5  --  8.7*   * 134* 147*   < > 139*    < > = values in this interval not displayed.       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) spondylosis  cervical  Associated tension headaches from neck pain  Comment: acute/ongoing, no change  Hospitalized 4/23-4/24/25  CT of cervical spine multilevel spondylosis without high grade canal stenosis  Plan: PT and OT, continue fiorinal 50-325mg q 8 hours prn, flexeril 5mg TID prn, voltaren gel  F/u with neurology as recommended during last hospital admission     (I48.20) Chronic atrial fibrillation (H)  (I11.0,  I50.32) Hypertensive heart disease with chronic diastolic congestive heart failure (H)  Comment: acute/ongoing , no change  S/p PPM   Plan: BMP follow, continue apixaban 5mg BID, lasix 40mg QD, toprol xl 25mg QD     (E11.22,  N18.31) Type 2 diabetes mellitus with stage 3a chronic kidney disease, without long-term current use of insulin (H)  Comment: ongoing, no change  Last HgbA1C 6.6%  Plan: blood sugar  monitoring, glipizide xl 2.5mg BID     (R41.89) Cognitive impairment  (F43.23) Adjustment disorder with mixed anxiety and depressed mood  Comment: acute/ongoing, no change  Lives in ILF, feels she is not being well taken care of  Plan: OT evaluation, SW to assist with discharge planning  Offer ACP  BIMS 15/15 and SBT 4/28, recommend further testing  continue Citalopram 10mg QD      ( 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 Pompa last visit 5/12/25 Plt stable     (G47.33) CRISTIANA (obstructive sleep apnea)   Comment: acute/ongoing, no change  Plan: does not use CPAP, monitor SaO2 at rest and with activity    MED REC REQUIRED  Post Medication Reconciliation Status: medication reconcilation previously completed during another office visit      Orders:  No new orders    Total time with patient visit: 47 minutes including discussions about the POC and care coordination with the patient. Greater than 50% of total time spent with counseling and coordinating care due to reviewed nursing and therapy progress notes, medications, POC. Discussed POC, medications and progress in therapy with patient at bedside. .   Electronically signed by: Tonya Lynn Haase, APRN CNP

## 2025-05-27 NOTE — LETTER
[facility]   To: Please give to facility    From:   Crystal Lin  RN  Care Coordinator   [facility]   P: [phone]  [email or web address]   Patient Name:  Savanna Rehman  YOB: 1940   Admit date: 05/22/2025      *Information Needed:  Please contact me when the patient will discharge (or if they will move to long term care)- include the discharge date, disposition, and main diagnosis   If the patient is discharged with home care services, please provide the name of the agency    Also- Please inform me if a care conference is being held.   Phone, Fax or Email with information                        Thank you

## 2025-05-28 ENCOUNTER — TRANSITIONAL CARE UNIT VISIT (OUTPATIENT)
Dept: GERIATRICS | Facility: CLINIC | Age: 85
End: 2025-05-28
Payer: COMMERCIAL

## 2025-05-28 VITALS
HEIGHT: 68 IN | SYSTOLIC BLOOD PRESSURE: 114 MMHG | TEMPERATURE: 98.5 F | WEIGHT: 231.9 LBS | RESPIRATION RATE: 18 BRPM | DIASTOLIC BLOOD PRESSURE: 72 MMHG | BODY MASS INDEX: 35.15 KG/M2 | OXYGEN SATURATION: 94 % | HEART RATE: 90 BPM

## 2025-05-28 DIAGNOSIS — I48.20 CHRONIC ATRIAL FIBRILLATION (H): ICD-10-CM

## 2025-05-28 DIAGNOSIS — I50.32 HYPERTENSIVE HEART DISEASE WITH CHRONIC DIASTOLIC CONGESTIVE HEART FAILURE (H): ICD-10-CM

## 2025-05-28 DIAGNOSIS — N18.31 TYPE 2 DIABETES MELLITUS WITH STAGE 3A CHRONIC KIDNEY DISEASE, WITHOUT LONG-TERM CURRENT USE OF INSULIN (H): ICD-10-CM

## 2025-05-28 DIAGNOSIS — R19.7 DIARRHEA, UNSPECIFIED TYPE: ICD-10-CM

## 2025-05-28 DIAGNOSIS — E11.22 TYPE 2 DIABETES MELLITUS WITH STAGE 3A CHRONIC KIDNEY DISEASE, WITHOUT LONG-TERM CURRENT USE OF INSULIN (H): ICD-10-CM

## 2025-05-28 DIAGNOSIS — N39.0 URINARY TRACT INFECTION WITHOUT HEMATURIA, SITE UNSPECIFIED: Primary | ICD-10-CM

## 2025-05-28 DIAGNOSIS — I11.0 HYPERTENSIVE HEART DISEASE WITH CHRONIC DIASTOLIC CONGESTIVE HEART FAILURE (H): ICD-10-CM

## 2025-05-28 PROCEDURE — 99305 1ST NF CARE MODERATE MDM 35: CPT | Performed by: INTERNAL MEDICINE

## 2025-05-28 NOTE — LETTER
5/28/2025      Savanna Rehman  [address]  [facility] [address]        [facility] GERIATRICS    PRIMARY CARE PROVIDER AND CLINIC:  BATSHEVA Joel CNP, [address] / [facility] [address]  Chief Complaint   Patient presents with     Hospital F/U      [facility] Medical Record Number:  [id number]  Place of Service where encounter took place:  [facility] ([facility])     Savanna Rehman  is a 84 year old  (1940), admitted to the above facility from  [facility]. Hospital stay 5/21/25 through 5/22/25..     Hospital course was reviewed by me, is as per the hospital discharge summary and nurse practitioner note.    Patient has a past medical history of coronary disease, atrial fibrillation, heart failure, diabetes mellitus type 2, iron deficiency anemia, recurrent C. difficile, chronic pain, CRISTIANA, chronic ITP.    She presented to the emergency room with lower abdominal pain as well as left-sided neck pain.  She had recently been hospitalized in April for similar symptoms, had been found to have a UTI with WALTER.  Patient was subsidy discharged to home.  Evaluation in the ER included CT of the abdomen pelvis which revealed bladder wall thickening consistent with cystitis as well as cirrhosis with manifestations of portal hypertension  Urine culture revealed E. coli, resistant to Cipro.  Previous urine cultures on 23 April and 3 April had revealed E. Coli, both sensitive to Cipro.  She was started on ceftriaxone in the hospital, was discharged on cefdinir.    Patient notes nausea vomiting with diarrhea over last 24 hours.  She refused cefdinir this morning as well as furosemide in view of diarrhea and concern for dehydration.  She denies dysuria but states she has chronic urinary incontinence.  She denies cough, chest pain, shortness of breath, abdominal pain.  She has been walking with therapy up until yesterday.  She denies neck pain today  Vital  signs have been stable.  Blood glucose have been in the 100s.    CODE STATUS/ADVANCE DIRECTIVES DISCUSSION:    CPR/Full code   ALLERGIES:   Allergies   Allergen Reactions     Augmented Betamethasone Diprop [Betamethasone] Other (See Comments)     Severe yeast infection     Petrolatum Anaphylaxis and Swelling     Rash and swelling     Shellfish-Derived Products Anaphylaxis     Tongue swelling     Bacitracin      Rash swelling     Bactrim [Sulfamethoxazole-Trimethoprim] Dizziness     Darvon [Propoxyphene] Swelling     Throat closes     Dilaudid [Hydromorphone]      No side effects from fentanyl June 2023     Levaquin [Levofloxacin] Swelling     Tongue swelling     Lidocaine      Facial swelling      Morphine Unknown     [facility] 2/23/24     Neomycin Swelling     rash     Neosporin [Neomycin-Polymyxin-Gramicidin] Swelling     rash     Nitrofurantoin      SOB, GI upset,     Oxycodone      Severe itching     Percocet [Oxycodone-Acetaminophen] Unknown     Percodan [Oxycodone-Aspirin]      Severe itching     Polymyxin B      Pramoxine      Tramadol      Vicodin [Hydrocodone-Acetaminophen]      Severe itching       Xarelto [Rivaroxaban]      Adhesive Tape Rash     Band aids      Codeine Rash     Hydrocortisone Rash and Swelling     Other Environmental Allergy Rash     Adhesive tape   Band aids       PAST MEDICAL HISTORY:   Past Medical History:   Diagnosis Date     Abnormal WBC count 04/12/2024     Acute encephalopathy 09/21/2023     Acute posthemorrhagic anemia 06/19/2023     Adjustment disorder with anxiety 11/28/2022     Adjustment disorder with anxious mood 04/12/2024     Anemia     Iron Deficiency anemia     Anemia due to blood loss, acute 06/13/2023     Anticoagulated on Coumadin 04/12/2024     Atrial fibrillation (H)      Atrial fibrillation, unspecified type (H) 06/26/2023     Bony pelvic pain 07/06/2022     C. difficile colitis, recurrent -- she needs to take Vanco 125 bid anytime she is on a  different Abx  03/18/2022     CAD (coronary artery disease)     non-obstructive     Candidiasis of skin 08/13/2019     Chest pain, unspecified type 06/13/2023     Chronic diarrhea 04/12/2024     Chronic pain     neck, low back, legs     Colonic diverticular abscess 04/01/2024     Confusion associated with infection 09/21/2023     Congestive heart failure (H)      Contusion of lower back and pelvis, subsequent encounter 06/26/2023     Contusion of right thigh, subsequent encounter 06/19/2023     Degenerative disk disease      Diabetes (H)      Diarrhea 08/28/2023     Diverticulitis 04/01/2024     Diverticulitis of large intestine with abscess, unspecified bleeding status 04/12/2024     Fibromyalgia      Gastro-oesophageal reflux disease      Gout      Hematoma of buttock 04/01/2024     Hiatal hernia      Hypervolemia, unspecified hypervolemia type 02/22/2024     Iron deficiency anemia 11/19/2014     Long term current use of anticoagulant therapy 06/26/2023     Mumps      Neuropathy      Noninfectious gastroenteritis 08/14/2015     CRISTIANA (obstructive sleep apnea) - CPAP      Palpitations      Pernicious anemia      Rash and nonspecific skin eruption 04/15/2024     Right-sided chest wall pain 06/19/2023     Skin yeast infection 06/26/2023     Sleep apnea     uses CPAP.     Subtherapeutic international normalized ratio (INR) 06/13/2023     Traumatic hematoma of buttock, subsequent encounter 06/13/2023     Urinary incontinence      Vision loss of right eye 02/11/2025     Vitamin D deficiency       PAST SURGICAL HISTORY:   has a past surgical history that includes Cholecystectomy; Hysterectomy total abdominal; appendectomy; colonoscopy (3/15/2011); Laparoscopic nissen fundoplication (N/A, 2/4/2015); Heart Cath Left heart cath (12/30/16); Coronary Angiography Adult Order; Tonsillectomy; Knee replacement NOS (Left); Transposition ulnar nerve (elbow); Cystoscopy, biopsy bladder, combined (N/A, 7/13/2020); Colonoscopy (N/A,  3/15/2023); Colonoscopy (N/A, 3/15/2023); Pacemaker Device & Lead Implant - Right Atrial & Right Ventricular (Left, 4/5/2024); and Esophagoscopy, gastroscopy, duodenoscopy (EGD), combined (N/A, 8/12/2024).  FAMILY HISTORY: family history includes Alzheimer Disease in her mother; Lung Cancer in her father; No Known Problems in her brother, brother, and brother.  SOCIAL HISTORY:   reports that she quit smoking about 10 years ago. Her smoking use included cigarettes. She started smoking about 60 years ago. She has a 25 pack-year smoking history. She has been exposed to tobacco smoke. She has never used smokeless tobacco. She reports that she does not currently use alcohol. She reports that she does not use drugs.  Patient's living condition: lives alone    Current medications reviewed by me today    Current Outpatient Medications   Medication Sig Dispense Refill     apixaban ANTICOAGULANT (ELIQUIS) 5 MG tablet Take 1 tablet (5 mg) by mouth 2 times daily. 120 tablet 1     blood glucose (NO BRAND SPECIFIED) test strip Use to test blood sugar one times daily or as directed. To accompany: Blood Glucose Monitor Brands: per insurance. 100 strip 6     blood glucose monitoring (NO BRAND SPECIFIED) meter device kit Use to test blood sugar one times daily or as directed. Preferred blood glucose meter OR supplies to accompany: Blood Glucose Monitor Brands: per insurance. 1 kit 0     butalbital-aspirin-caffeine (FIORINAL) -40 MG capsule Take 1 capsule by mouth every 8 hours as needed for headaches. 30 capsule 0     cefdinir (OMNICEF) 300 MG capsule Take 1 capsule (300 mg) by mouth 2 times daily for 7 days.       cholecalciferol (VITAMIN D3) 10 mcg (400 units) TABS tablet Take 10 mcg by mouth daily.       citalopram (CELEXA) 10 MG tablet Take 1 tablet (10 mg) by mouth daily.       cyclobenzaprine (FLEXERIL) 5 MG tablet Take 1 tablet (5 mg) by mouth 3 times daily as needed for muscle spasms. 30 tablet 0     diclofenac  "(VOLTAREN) 1 % topical gel Apply 2 g topically 4 times daily. 50 g 0     EPINEPHrine (ANY BX GENERIC EQUIV) 0.3 MG/0.3ML injection 2-pack Inject 0.3 mLs (0.3 mg) into the muscle as needed for anaphylaxis May repeat one time in 5-15 minutes if response to initial dose is inadequate. 2 each 3     furosemide (LASIX) 40 MG tablet Take 1 tablet by mouth once daily 90 tablet 1     glipiZIDE (GLUCOTROL XL) 2.5 MG 24 hr tablet Take 1 tablet by mouth twice daily 180 tablet 3     loperamide (IMODIUM A-D) 2 MG tablet Take 2 mg by mouth every 6 hours as needed for diarrhea.       metoprolol succinate ER (TOPROL XL) 25 MG 24 hr tablet Take 1 tablet (25 mg) by mouth daily. 30 tablet 11     omeprazole (PRILOSEC) 20 MG DR capsule Take 20 mg by mouth daily.       ondansetron (ZOFRAN) 4 MG tablet Take 4 mg by mouth every 6 hours as needed for nausea.       thin (NO BRAND SPECIFIED) lancets Use with lanceting device. To accompany: Blood Glucose Monitor Brands: per insurance. 100 each 6     No current facility-administered medications for this visit.       ROS:  10 point ROS of systems including Constitutional, Eyes, Respiratory, Cardiovascular, Gastroenterology, Genitourinary, Integumentary, Musculoskeletal, Psychiatric were all negative except for pertinent positives noted in my HPI.    Vitals:  /72   Pulse 90   Temp 98.5  F (36.9  C)   Resp 18   Ht 1.737 m (5' 8.4\")   Wt 105.2 kg (231 lb 14.4 oz)   LMP  (LMP Unknown)   SpO2 94%   BMI 34.85 kg/m    Exam:  Obese female, lying in bed, in no distress  HEENT: Oral mucosa moist  Lungs clear  CV regular rhythm  Abdomen soft, protuberant,  Extremities without edema  Skin: Faint macular rash over right forearm only  Neuro: Alert, fully oriented.  No gross focal weakness.      Lab/Diagnostic data:  Most Recent 3 CBC's:  Recent Labs   Lab Test 05/21/25  1530 05/12/25  1216 04/24/25  0456   WBC 5.0 4.9 6.2   HGB 12.8 13.1 11.9   MCV 92 91 91   PLT 91* 136* 100*     Most Recent 3 " BMP's:  Recent Labs   Lab Test 05/22/25  1154 05/21/25  1530 05/12/25  1216 04/24/25  2145 04/24/25  0456   NA  --  137 139  --  136   POTASSIUM  --  3.7 4.6  --  4.0   CHLORIDE  --  98 102  --  100   CO2  --  26 26  --  28   BUN  --  22.4 18.5  --  15.0   CR  --  1.17* 0.92  --  0.99*   ANIONGAP  --  13 11  --  8   SAUL  --  9.2 9.5  --  8.7*   * 134* 147*   < > 139*    < > = values in this interval not displayed.       ASSESSMENT/PLAN:      (N39.0) Urinary tract infection without hematuria, site unspecified  (primary encounter diagnosis)  (R53.81) Physical deconditioning  Recurrent UTI, with E coli  Patient is in her 6-day of antibiotics, will discontinue in view of onset of diarrhea  No clear symptoms of UTI currently, unclear if patient was truly having symptomatic UTI that led to hospitalization.  She notes chronic urinary incontinence  CT of the abdomen pelvis performed during hospitalization revealed no structural abnormality of kidney or bladder  Plan: Monitor  status.  Would only treat if symptomatic UTI.  If patient clearly is having recurrent symptomatic UTI, should be seen by urology.  Therapies.    Diarrhea  History of C. Difficile  Abdominal exam benign  Plan: Monitor GI status.  If persistent diarrhea, obtains stool for C. difficile     (M54.2) Acute neck pain  (M47.812) spondylosis  cervical  Associated tension headaches from neck pain  CT of cervical spine multilevel spondylosis without high grade canal stenosis  Plan: PT and OT, continue fiorinal 50-325mg q 8 hours prn, flexeril 5mg TID prn, voltaren gel  F/u with neurology      (I48.20) Chronic atrial fibrillation (H), status post pacemaker placement  (I11.0,  I50.32) Hypertensive heart disease with chronic diastolic congestive heart failure (H)  Plan: Furosemide put on hold today in view of diarrhea, reassess in a.m., continue apixaban 5mg BID,  toprol xl 25mg every day  Monitor BMP     (E11.22,  N18.31) Type 2 diabetes mellitus with  stage 3a chronic kidney disease, without long-term current use of insulin (H)  Comment: ongoing, no change  Last HgbA1C 6.6%  Plan: blood sugar monitoring, glipizide xl 2.5mg BID       (F43.23) Adjustment disorder with mixed anxiety and depressed mood  Comment: acute/ongoing, no change  Plan: OT evaluation, SW to assist with discharge planning  BIMS 15/15 and SBT 4/28, recommend further testing  continue Citalopram 10mg QD      ( 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 Pompa last visit 5/12/25 Plt count stable, around 100 K     (G47.33) CRISTIANA (obstructive sleep apnea)   Comment: acute/ongoing, no change  Plan: does not use CPAP, monitor SaO2 at rest and with activity           Gael Beebe MD      Sincerely,        Gael Beebe MD    Electronically signed

## 2025-05-28 NOTE — PROGRESS NOTES
"[facility] GERIATRICS    Chief Complaint   Patient presents with    RECHECK     HPI:  Savanna Rehman is a 84 year old  (1940), who is being seen today for an episodic care visit at: [facility] ([facility]) [25]. Today's concern is:   UTI: today on exam patient states she is feeling \"much better today\" she ate all of her breakfast, no c/o dyuria, fever, chills, states Nausea and diarrhea have resolved  In therapy walking up to 150 feet using a 4ww with SBA prior to recent illness  Neck pain offers no c/o neck pain at time of exam  HTN/CHF/atrial fib: /66, 114/72, 111/70  with HR  80 range, denies CP, palpitations, SOB, weight on admission was 225lbs current weight is 231.9lbs  DMII: blood sugar range 113-157  Cognitive impairment BIMS 15/15 and SBT 4/28, unreliable historian per nursing and therapy notes  Thrombocytopenia see labs    Allergies, and PMH/PSH reviewed in [facility] today.  REVIEW OF SYSTEMS:  10 point ROS of systems including Constitutional, Eyes, Respiratory, Cardiovascular, Gastroenterology, Genitourinary, Integumentary, Musculoskeletal, Psychiatric were all negative except for pertinent positives noted in my HPI.    Objective:   /66   Pulse 86   Temp 98.2  F (36.8  C)   Resp 16   Ht 1.737 m (5' 8.4\")   Wt 104.8 kg (231 lb)   LMP  (LMP Unknown)   SpO2 92%   BMI 34.71 kg/m    GENERAL APPEARANCE:  Alert, in no distress, morbidly obese  ENT:  Mouth and posterior oropharynx normal, moist mucous membranes, [place]  EYES:  EOM, conjunctivae, lids, pupils and irises normal, PERRL  RESP:  respiratory effort and palpation of chest normal, lungs clear to auscultation , no respiratory distress  CV:  regular rate and rhythm, no murmur, rub, or gallop, peripheral edema trace+ in LE bilaterally  ABDOMEN:  normal bowel sounds, soft, nontender, no hepatosplenomegaly or other masses  M/S:   patient sitting up on edge of bed  SKIN:  Inspection of skin and subcutaneous tissue baseline  NEURO:   " speech wnl  PSYCH:  affect and mood normal    Recent labs in [facility] reviewed by me today.  and Most Recent 3 CBC's:  Recent Labs   Lab Test 05/21/25  1530 05/12/25  1216 04/24/25  0456   WBC 5.0 4.9 6.2   HGB 12.8 13.1 11.9   MCV 92 91 91   PLT 91* 136* 100*     Most Recent 3 BMP's:  Recent Labs   Lab Test 05/22/25  1154 05/21/25  1530 05/12/25  1216 04/24/25  2145 04/24/25  0456   NA  --  137 139  --  136   POTASSIUM  --  3.7 4.6  --  4.0   CHLORIDE  --  98 102  --  100   CO2  --  26 26  --  28   BUN  --  22.4 18.5  --  15.0   CR  --  1.17* 0.92  --  0.99*   ANIONGAP  --  13 11  --  8   SAUL  --  9.2 9.5  --  8.7*   * 134* 147*   < > 139*    < > = values in this interval not displayed.       Assessment/Plan:  N39.0) Urinary tract infection without hematuria, site unspecified  (primary encounter diagnosis)  (R53.81) Physical deconditioning  Comment: acute/ongoing, no change  Recurrent UTI  Plan: finished coarse of cefdinir (completed early d/t Nausea and diarrhea), continue PT and OT  Recommend f/u with urology as OP     (M54.2) Acute neck pain  (M47.812) spondylosis  cervical  Associated tension headaches from neck pain  Comment: acute/ongoing, improved  Hospitalized 4/23-4/24/25  CT of cervical spine multilevel spondylosis without high grade canal stenosis  Plan: PT and OT, continue fiorinal 50-325mg q 8 hours prn, flexeril 5mg TID prn, voltaren gel  F/u with neurology as recommended during last hospital admission     (I48.20) Chronic atrial fibrillation (H)  (I11.0,  I50.32) Hypertensive heart disease with chronic diastolic congestive heart failure (H)  Comment: acute/ongoing , no change  S/p PPM   Plan: BMP follow, continue apixaban 5mg BID, toprol xl 25mg QD  Change lasix to 40mg QOD per patient request     (E11.22,  N18.31) Type 2 diabetes mellitus with stage 3a chronic kidney disease, without long-term current use of insulin (H)  Comment: ongoing, no change  Last HgbA1C 6.6%  Plan: blood sugar monitoring,  glipizide xl 2.5mg BID     (R41.89) Cognitive impairment  (F43.23) Adjustment disorder with mixed anxiety and depressed mood  Comment: acute/ongoing, no change  Lives in ILF, feels she is not being well taken care of  Plan: OT evaluation, SW to assist with discharge planning  Offer ACP  BIMS 15/15 and SBT 4/28, recommend further testing  continue Citalopram 10mg QD      ( 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 Pompa last visit 5/12/25 Plt stable     (G47.33) CRISTIANA (obstructive sleep apnea)   Comment: acute/ongoing, no change  Plan: does not use CPAP, monitor SaO2 at rest and with activity       MED REC REQUIRED  Post Medication Reconciliation Status: medication reconcilation previously completed during another office visit      Orders:  Change lasix to 40mg QOD per patient request    Total time with patient visit: 46 minutes including discussions about the POC and care coordination with the patient. Greater than 50% of total time spent with counseling and coordinating care due to reviewed nursing and therapy progress notes, medications, POC, vitals. Discussed POC and medications with patient at bedside. Discussed medications with nursing at facility and discussed POC with IDT at facility.   Electronically signed by: Tonya Lynn Haase, APRN CNP

## 2025-05-29 ENCOUNTER — TRANSITIONAL CARE UNIT VISIT (OUTPATIENT)
Dept: GERIATRICS | Facility: CLINIC | Age: 85
End: 2025-05-29
Payer: COMMERCIAL

## 2025-05-29 VITALS
BODY MASS INDEX: 35.01 KG/M2 | HEIGHT: 68 IN | TEMPERATURE: 98.2 F | WEIGHT: 231 LBS | HEART RATE: 86 BPM | SYSTOLIC BLOOD PRESSURE: 112 MMHG | DIASTOLIC BLOOD PRESSURE: 66 MMHG | RESPIRATION RATE: 16 BRPM | OXYGEN SATURATION: 92 %

## 2025-05-29 DIAGNOSIS — M79.89 SWELLING OF LOWER LEG: ICD-10-CM

## 2025-05-29 DIAGNOSIS — R41.89 COGNITIVE IMPAIRMENT: ICD-10-CM

## 2025-05-29 DIAGNOSIS — I11.0 HYPERTENSIVE HEART DISEASE WITH CHRONIC DIASTOLIC CONGESTIVE HEART FAILURE (H): ICD-10-CM

## 2025-05-29 DIAGNOSIS — R19.7 DIARRHEA, UNSPECIFIED TYPE: ICD-10-CM

## 2025-05-29 DIAGNOSIS — I50.9 CONGESTIVE HEART FAILURE, UNSPECIFIED HF CHRONICITY, UNSPECIFIED HEART FAILURE TYPE (H): ICD-10-CM

## 2025-05-29 DIAGNOSIS — I50.32 HYPERTENSIVE HEART DISEASE WITH CHRONIC DIASTOLIC CONGESTIVE HEART FAILURE (H): ICD-10-CM

## 2025-05-29 DIAGNOSIS — D69.3 IDIOPATHIC THROMBOCYTOPENIC PURPURA (H): ICD-10-CM

## 2025-05-29 DIAGNOSIS — M47.812 SPONDYLOSIS OF CERVICAL SPINE: ICD-10-CM

## 2025-05-29 DIAGNOSIS — E11.22 TYPE 2 DIABETES MELLITUS WITH STAGE 3A CHRONIC KIDNEY DISEASE, WITHOUT LONG-TERM CURRENT USE OF INSULIN (H): ICD-10-CM

## 2025-05-29 DIAGNOSIS — M54.2 ACUTE NECK PAIN: ICD-10-CM

## 2025-05-29 DIAGNOSIS — N39.0 URINARY TRACT INFECTION WITHOUT HEMATURIA, SITE UNSPECIFIED: Primary | ICD-10-CM

## 2025-05-29 DIAGNOSIS — I48.20 CHRONIC ATRIAL FIBRILLATION (H): ICD-10-CM

## 2025-05-29 DIAGNOSIS — G47.33 OSA (OBSTRUCTIVE SLEEP APNEA): ICD-10-CM

## 2025-05-29 DIAGNOSIS — F43.23 ADJUSTMENT DISORDER WITH MIXED ANXIETY AND DEPRESSED MOOD: ICD-10-CM

## 2025-05-29 DIAGNOSIS — R53.81 PHYSICAL DECONDITIONING: ICD-10-CM

## 2025-05-29 DIAGNOSIS — N18.31 TYPE 2 DIABETES MELLITUS WITH STAGE 3A CHRONIC KIDNEY DISEASE, WITHOUT LONG-TERM CURRENT USE OF INSULIN (H): ICD-10-CM

## 2025-05-29 RX ORDER — FUROSEMIDE 40 MG/1
40 TABLET ORAL EVERY OTHER DAY
Status: SHIPPED
Start: 2025-05-29

## 2025-05-29 NOTE — LETTER
" 5/29/2025      Savanna Rehman  [address]  [facility] [address]        [facility] GERIATRICS    Chief Complaint   Patient presents with     RECHECK     HPI:  Savanna Rehman is a 84 year old  (1940), who is being seen today for an episodic care visit at: [facility]) [25]. Today's concern is:   UTI: today on exam patient states she is feeling \"much better today\" she ate all of her breakfast, no c/o dyuria, fever, chills, states Nausea and diarrhea have resolved  In therapy walking up to 150 feet using a 4ww with SBA prior to recent illness  Neck pain offers no c/o neck pain at time of exam  HTN/CHF/atrial fib: /66, 114/72, 111/70  with HR  80 range, denies CP, palpitations, SOB, weight on admission was 225lbs current weight is 231.9lbs  DMII: blood sugar range 113-157  Cognitive impairment BIMS 15/15 and SBT 4/28, unreliable historian per nursing and therapy notes  Thrombocytopenia see labs    Allergies, and PMH/PSH reviewed in [facility] today.  REVIEW OF SYSTEMS:  10 point ROS of systems including Constitutional, Eyes, Respiratory, Cardiovascular, Gastroenterology, Genitourinary, Integumentary, Musculoskeletal, Psychiatric were all negative except for pertinent positives noted in my HPI.    Objective:   /66   Pulse 86   Temp 98.2  F (36.8  C)   Resp 16   Ht 1.737 m (5' 8.4\")   Wt 104.8 kg (231 lb)   LMP  (LMP Unknown)   SpO2 92%   BMI 34.71 kg/m    GENERAL APPEARANCE:  Alert, in no distress, morbidly obese  ENT:  Mouth and posterior oropharynx normal, moist mucous membranes, [place]  EYES:  EOM, conjunctivae, lids, pupils and irises normal, PERRL  RESP:  respiratory effort and palpation of chest normal, lungs clear to auscultation , no respiratory distress  CV:  regular rate and rhythm, no murmur, rub, or gallop, peripheral edema trace+ in LE bilaterally  ABDOMEN:  normal bowel sounds, soft, nontender, no hepatosplenomegaly or other masses  M/S:   " patient sitting up on edge of bed  SKIN:  Inspection of skin and subcutaneous tissue baseline  NEURO:   speech wnl  PSYCH:  affect and mood normal    Recent labs in [facility] reviewed by me today.  and Most Recent 3 CBC's:  Recent Labs   Lab Test 05/21/25  1530 05/12/25  1216 04/24/25  0456   WBC 5.0 4.9 6.2   HGB 12.8 13.1 11.9   MCV 92 91 91   PLT 91* 136* 100*     Most Recent 3 BMP's:  Recent Labs   Lab Test 05/22/25  1154 05/21/25  1530 05/12/25  1216 04/24/25  2145 04/24/25  0456   NA  --  137 139  --  136   POTASSIUM  --  3.7 4.6  --  4.0   CHLORIDE  --  98 102  --  100   CO2  --  26 26  --  28   BUN  --  22.4 18.5  --  15.0   CR  --  1.17* 0.92  --  0.99*   ANIONGAP  --  13 11  --  8   SAUL  --  9.2 9.5  --  8.7*   * 134* 147*   < > 139*    < > = values in this interval not displayed.       Assessment/Plan:  N39.0) Urinary tract infection without hematuria, site unspecified  (primary encounter diagnosis)  (R53.81) Physical deconditioning  Comment: acute/ongoing, no change  Recurrent UTI  Plan: finished coarse of cefdinir (completed early d/t Nausea and diarrhea), continue PT and OT  Recommend f/u with urology as OP     (M54.2) Acute neck pain  (M47.812) spondylosis  cervical  Associated tension headaches from neck pain  Comment: acute/ongoing, improved  Hospitalized 4/23-4/24/25  CT of cervical spine multilevel spondylosis without high grade canal stenosis  Plan: PT and OT, continue fiorinal 50-325mg q 8 hours prn, flexeril 5mg TID prn, voltaren gel  F/u with neurology as recommended during last hospital admission     (I48.20) Chronic atrial fibrillation (H)  (I11.0,  I50.32) Hypertensive heart disease with chronic diastolic congestive heart failure (H)  Comment: acute/ongoing , no change  S/p PPM   Plan: BMP follow, continue apixaban 5mg BID, toprol xl 25mg QD  Change lasix to 40mg QOD per patient request     (E11.22,  N18.31) Type 2 diabetes mellitus with stage 3a chronic kidney disease, without long-term  current use of insulin (H)  Comment: ongoing, no change  Last HgbA1C 6.6%  Plan: blood sugar monitoring, glipizide xl 2.5mg BID     (R41.89) Cognitive impairment  (F43.23) Adjustment disorder with mixed anxiety and depressed mood  Comment: acute/ongoing, no change  Lives in ILF, feels she is not being well taken care of  Plan: OT evaluation, SW to assist with discharge planning  Offer ACP  BIMS 15/15 and SBT 4/28, recommend further testing  continue Citalopram 10mg QD      ( 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 Pompa last visit 5/12/25 Plt stable     (G47.33) CRISTIANA (obstructive sleep apnea)   Comment: acute/ongoing, no change  Plan: does not use CPAP, monitor SaO2 at rest and with activity       MED REC REQUIRED  Post Medication Reconciliation Status: medication reconcilation previously completed during another office visit      Orders:  Change lasix to 40mg QOD per patient request    Total time with patient visit: 46 minutes including discussions about the POC and care coordination with the patient. Greater than 50% of total time spent with counseling and coordinating care due to reviewed nursing and therapy progress notes, medications, POC, vitals. Discussed POC and medications with patient at bedside. Discussed medications with nursing at facility and discussed POC with IDT at facility.   Electronically signed by: Tonya Lynn Haase, APRN CNP          Sincerely,        Tonya Lynn Haase, APRN CNP    Electronically signed

## 2025-06-03 ENCOUNTER — TRANSITIONAL CARE UNIT VISIT (OUTPATIENT)
Dept: GERIATRICS | Facility: CLINIC | Age: 85
End: 2025-06-03
Payer: COMMERCIAL

## 2025-06-03 ENCOUNTER — TELEPHONE (OUTPATIENT)
Dept: FAMILY MEDICINE | Facility: CLINIC | Age: 85
End: 2025-06-03

## 2025-06-03 ENCOUNTER — TELEPHONE (OUTPATIENT)
Dept: CARDIOLOGY | Facility: CLINIC | Age: 85
End: 2025-06-03

## 2025-06-03 VITALS
BODY MASS INDEX: 35.5 KG/M2 | WEIGHT: 248 LBS | DIASTOLIC BLOOD PRESSURE: 68 MMHG | HEART RATE: 72 BPM | TEMPERATURE: 98 F | HEIGHT: 70 IN | RESPIRATION RATE: 16 BRPM | SYSTOLIC BLOOD PRESSURE: 114 MMHG | OXYGEN SATURATION: 92 %

## 2025-06-03 DIAGNOSIS — G47.33 OSA (OBSTRUCTIVE SLEEP APNEA): ICD-10-CM

## 2025-06-03 DIAGNOSIS — N18.31 TYPE 2 DIABETES MELLITUS WITH STAGE 3A CHRONIC KIDNEY DISEASE, WITHOUT LONG-TERM CURRENT USE OF INSULIN (H): ICD-10-CM

## 2025-06-03 DIAGNOSIS — D69.3 IDIOPATHIC THROMBOCYTOPENIC PURPURA (H): ICD-10-CM

## 2025-06-03 DIAGNOSIS — F43.23 ADJUSTMENT DISORDER WITH MIXED ANXIETY AND DEPRESSED MOOD: ICD-10-CM

## 2025-06-03 DIAGNOSIS — G47.00 INSOMNIA, UNSPECIFIED TYPE: ICD-10-CM

## 2025-06-03 DIAGNOSIS — M54.2 ACUTE NECK PAIN: ICD-10-CM

## 2025-06-03 DIAGNOSIS — I11.0 HYPERTENSIVE HEART DISEASE WITH CHRONIC DIASTOLIC CONGESTIVE HEART FAILURE (H): ICD-10-CM

## 2025-06-03 DIAGNOSIS — I50.32 HYPERTENSIVE HEART DISEASE WITH CHRONIC DIASTOLIC CONGESTIVE HEART FAILURE (H): ICD-10-CM

## 2025-06-03 DIAGNOSIS — M47.812 SPONDYLOSIS OF CERVICAL SPINE: ICD-10-CM

## 2025-06-03 DIAGNOSIS — I48.20 CHRONIC ATRIAL FIBRILLATION (H): ICD-10-CM

## 2025-06-03 DIAGNOSIS — E11.22 TYPE 2 DIABETES MELLITUS WITH STAGE 3A CHRONIC KIDNEY DISEASE, WITHOUT LONG-TERM CURRENT USE OF INSULIN (H): ICD-10-CM

## 2025-06-03 DIAGNOSIS — R19.7 DIARRHEA, UNSPECIFIED TYPE: ICD-10-CM

## 2025-06-03 DIAGNOSIS — I50.9 CONGESTIVE HEART FAILURE, UNSPECIFIED HF CHRONICITY, UNSPECIFIED HEART FAILURE TYPE (H): ICD-10-CM

## 2025-06-03 DIAGNOSIS — N39.0 URINARY TRACT INFECTION WITHOUT HEMATURIA, SITE UNSPECIFIED: Primary | ICD-10-CM

## 2025-06-03 DIAGNOSIS — R53.81 PHYSICAL DECONDITIONING: ICD-10-CM

## 2025-06-03 DIAGNOSIS — R41.89 COGNITIVE IMPAIRMENT: ICD-10-CM

## 2025-06-03 PROCEDURE — 99310 SBSQ NF CARE HIGH MDM 45: CPT | Performed by: NURSE PRACTITIONER

## 2025-06-03 NOTE — LETTER
" 6/3/2025      Savanna Rehman  [address]  [facility] [address]        [facility] GERIATRICS    Chief Complaint   Patient presents with     RECHECK     HPI:  Savanna Rehman is a 84 year old  (1940), who is being seen today for an episodic care visit at: [facility]) [25]. Today's concern is:   UTI: today on exam patient sitting up in w/c, alert, pleasant, state she is feeling very well, denies dysuria, fever or chills  In therapy walking up to 200 feet using a 4ww independently, independent with ADL's  Neck pain offers no c/o neck pain at time of exam  HTN/CHF/atrial fib: /80, 116/53, 104/71  with HR  60 range, denies CP, palpitations, SOB, weight on admission was 225lbs current weight is 228lbs  DMII: blood sugar range 125-184  Cognitive impairment BIMS 15/15 and SBT 4/28, unreliable historian per nursing and therapy notes  Thrombocytopenia see labs  Insomnia patient states she has a hard time falling asleep at night \"I worry about everyone here\" states she takes melatonin at home.        Allergies, and PMH/PSH reviewed in EPIC today.  REVIEW OF SYSTEMS:  10 point ROS of systems including Constitutional, Eyes, Respiratory, Cardiovascular, Gastroenterology, Genitourinary, Integumentary, Musculoskeletal, Psychiatric were all negative except for pertinent positives noted in my HPI.    Objective:   /68   Pulse 72   Temp 98  F (36.7  C)   Resp 16   Ht 1.778 m (5' 10\")   Wt 112.5 kg (248 lb)   LMP  (LMP Unknown)   SpO2 92%   BMI 35.58 kg/m    GENERAL APPEARANCE:  Alert, in no distress, morbidly obese  ENT:  Mouth and posterior oropharynx normal, moist mucous membranes, normal hearing acuity  EYES:  EOM, conjunctivae, lids, pupils and irises normal, PERRL  RESP:  respiratory effort and palpation of chest normal, lungs clear to auscultation , no respiratory distress  CV:  regular rate and rhythm, no murmur, rub, or gallop, peripheral edema trace+ in LE " bilaterally  ABDOMEN:  normal bowel sounds, soft, nontender, no hepatosplenomegaly or other masses  M/S:   patient sitting up in chair in room  SKIN:  Inspection of skin and subcutaneous tissue baseline  NEURO:   speech wnl  PSYCH:  affect and mood normal    Recent labs in [facility] reviewed by me today.  and Most Recent 3 CBC's:  Recent Labs   Lab Test 05/21/25  1530 05/12/25  1216 04/24/25  0456   WBC 5.0 4.9 6.2   HGB 12.8 13.1 11.9   MCV 92 91 91   PLT 91* 136* 100*     Most Recent 3 BMP's:  Recent Labs   Lab Test 05/22/25  1154 05/21/25  1530 05/12/25  1216 04/24/25  2145 04/24/25  0456   NA  --  137 139  --  136   POTASSIUM  --  3.7 4.6  --  4.0   CHLORIDE  --  98 102  --  100   CO2  --  26 26  --  28   BUN  --  22.4 18.5  --  15.0   CR  --  1.17* 0.92  --  0.99*   ANIONGAP  --  13 11  --  8   SAUL  --  9.2 9.5  --  8.7*   * 134* 147*   < > 139*    < > = values in this interval not displayed.       Assessment/Plan:  N39.0) Urinary tract infection without hematuria, site unspecified  (primary encounter diagnosis)  (R53.81) Physical deconditioning  Comment: acute/ongoing, no change  Recurrent UTI  Plan: finished coarse of cefdinir (completed early d/t Nausea and diarrhea), continue PT and OT  Recommend f/u with urology as OP     (M54.2) Acute neck pain  (M47.812) spondylosis  cervical  Associated tension headaches from neck pain  Comment: improved  Hospitalized 4/23-4/24/25  CT of cervical spine multilevel spondylosis without high grade canal stenosis  Plan: PT and OT, continue fiorinal 50-325mg q 8 hours prn, flexeril 5mg TID prn, voltaren gel  F/u with neurology as recommended during last hospital admission     (I48.20) Chronic atrial fibrillation (H)  (I11.0,  I50.32) Hypertensive heart disease with chronic diastolic congestive heart failure (H)  Comment: acute/ongoing , no change  S/p PPM   Plan: BMP follow, continue apixaban 5mg BID, toprol xl 25mg QD  Change lasix to 40mg QOD per patient request      (E11.22,  N18.31) Type 2 diabetes mellitus with stage 3a chronic kidney disease, without long-term current use of insulin (H)  Comment: ongoing, no change  Last HgbA1C 6.6%  Plan: blood sugar monitoring, glipizide xl 2.5mg BID     (R41.89) Cognitive impairment  (F43.23) Adjustment disorder with mixed anxiety and depressed mood  Comment: acute/ongoing, no change  Lives in ILF, feels she is not being well taken care of  Plan: OT evaluation, SW to assist with discharge planning  Offer ACP  BIMS 15/15 and SBT 4/28, recommend further testing  continue Citalopram 10mg QD      ( 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 Pompa last visit 5/12/25 Plt stable     (G47.33) CRISTIANA (obstructive sleep apnea)   Comment: acute/ongoing, no change  Plan: does not use CPAP, monitor SaO2 at rest and with activity    (G47.00) insomnia  Acute/ongoing  Plan: melatonin 5mg qhs      MED REC REQUIRED  Post Medication Reconciliation Status: medication reconcilation previously completed during another office visit      Orders:  Melatonin 5mg qhs    Total time with patient visit: 47 minutes including discussions about the POC and care coordination with the patient. Greater than 50% of total time spent with counseling and coordinating care due to reviewed nursing and therapy progress notes, medications, POC. Discussed POC, medications, insomnia and medications, chronic migraines and recommendations for medications, recommend patient f/u with neurology for medication management of migraines. .   Electronically signed by: Tonya Lynn Haase, APRN CNP         Sincerely,        Tonya Lynn Haase, APRN CNP    Electronically signed

## 2025-06-03 NOTE — TELEPHONE ENCOUNTER
[facility] Call Center    Phone Message    May a detailed message be left on voicemail: yes     Reason for Call: Other: Pt  called to apologize regarding missing appointment due her being in rehab.     Action Taken: Other: cardio    Travel Screening: Not Applicable     Date of Service: Thank you!  [facility]

## 2025-06-03 NOTE — TELEPHONE ENCOUNTER
Patient calling with updates.  Was to ER via ambulance 5/21/25.  In observation 3 1/2 days.  Transferred to TCU.  Gets to go home from TCU 6/7/25.  In home PT for 2 weeks when she gets home.  She is not happy she has to go home as she would rather stay and do PT and OT at TCU.  Also will not get to have same nurse she has had for 10 years.  Will want virtual visit when she gets home.  Will need letter that she can not stay on the 4th floor.  There are none available currently but they will do everything they can to move her to the 1st floor til she can get Assisted Living.       Had COVID shot yesterday and getting RSV shot today after we hang-up.    Scheduled telephone visit 6/11/25 to discuss note 1:30 pm.  FYI to provider.      Meghann Roach RN

## 2025-06-03 NOTE — PROGRESS NOTES
"[facility] GERIATRICS    Chief Complaint   Patient presents with    RECHECK     HPI:  Savanna Rehman is a 84 year old  (1940), who is being seen today for an episodic care visit at: [facility]) [25]. Today's concern is:   UTI: today on exam patient sitting up in w/c, alert, pleasant, state she is feeling very well, denies dysuria, fever or chills  In therapy walking up to 200 feet using a 4ww independently, independent with ADL's  Neck pain offers no c/o neck pain at time of exam  HTN/CHF/atrial fib: /80, 116/53, 104/71  with HR  60 range, denies CP, palpitations, SOB, weight on admission was 225lbs current weight is 228lbs  DMII: blood sugar range 125-184  Cognitive impairment BIMS 15/15 and SBT 4/28, unreliable historian per nursing and therapy notes  Thrombocytopenia see labs  Insomnia patient states she has a hard time falling asleep at night \"I worry about everyone here\" states she takes melatonin at home.        Allergies, and PMH/PSH reviewed in [facility] today.  REVIEW OF SYSTEMS:  10 point ROS of systems including Constitutional, Eyes, Respiratory, Cardiovascular, Gastroenterology, Genitourinary, Integumentary, Musculoskeletal, Psychiatric were all negative except for pertinent positives noted in my HPI.    Objective:   /68   Pulse 72   Temp 98  F (36.7  C)   Resp 16   Ht 1.778 m (5' 10\")   Wt 112.5 kg (248 lb)   LMP  (LMP Unknown)   SpO2 92%   BMI 35.58 kg/m    GENERAL APPEARANCE:  Alert, in no distress, morbidly obese  ENT:  Mouth and posterior oropharynx normal, moist mucous membranes, normal hearing acuity  EYES:  EOM, conjunctivae, lids, pupils and irises normal, PERRL  RESP:  respiratory effort and palpation of chest normal, lungs clear to auscultation , no respiratory distress  CV:  regular rate and rhythm, no murmur, rub, or gallop, peripheral edema trace+ in LE bilaterally  ABDOMEN:  normal bowel sounds, soft, nontender, no hepatosplenomegaly or other " masses  M/S:   patient sitting up in chair in room  SKIN:  Inspection of skin and subcutaneous tissue baseline  NEURO:   speech wnl  PSYCH:  affect and mood normal    Recent labs in [facility] reviewed by me today.  and Most Recent 3 CBC's:  Recent Labs   Lab Test 05/21/25  1530 05/12/25  1216 04/24/25  0456   WBC 5.0 4.9 6.2   HGB 12.8 13.1 11.9   MCV 92 91 91   PLT 91* 136* 100*     Most Recent 3 BMP's:  Recent Labs   Lab Test 05/22/25  1154 05/21/25  1530 05/12/25  1216 04/24/25  2145 04/24/25  0456   NA  --  137 139  --  136   POTASSIUM  --  3.7 4.6  --  4.0   CHLORIDE  --  98 102  --  100   CO2  --  26 26  --  28   BUN  --  22.4 18.5  --  15.0   CR  --  1.17* 0.92  --  0.99*   ANIONGAP  --  13 11  --  8   SAUL  --  9.2 9.5  --  8.7*   * 134* 147*   < > 139*    < > = values in this interval not displayed.       Assessment/Plan:  N39.0) Urinary tract infection without hematuria, site unspecified  (primary encounter diagnosis)  (R53.81) Physical deconditioning  Comment: acute/ongoing, no change  Recurrent UTI  Plan: finished coarse of cefdinir (completed early d/t Nausea and diarrhea), continue PT and OT  Recommend f/u with urology as OP     (M54.2) Acute neck pain  (M47.812) spondylosis  cervical  Associated tension headaches from neck pain  Comment: improved  Hospitalized 4/23-4/24/25  CT of cervical spine multilevel spondylosis without high grade canal stenosis  Plan: PT and OT, continue fiorinal 50-325mg q 8 hours prn, flexeril 5mg TID prn, voltaren gel  F/u with neurology as recommended during last hospital admission     (I48.20) Chronic atrial fibrillation (H)  (I11.0,  I50.32) Hypertensive heart disease with chronic diastolic congestive heart failure (H)  Comment: acute/ongoing , no change  S/p PPM   Plan: BMP follow, continue apixaban 5mg BID, toprol xl 25mg QD  Change lasix to 40mg QOD per patient request     (E11.22,  N18.31) Type 2 diabetes mellitus with stage 3a chronic kidney disease, without  long-term current use of insulin (H)  Comment: ongoing, no change  Last HgbA1C 6.6%  Plan: blood sugar monitoring, glipizide xl 2.5mg BID     (R41.89) Cognitive impairment  (F43.23) Adjustment disorder with mixed anxiety and depressed mood  Comment: acute/ongoing, no change  Lives in ILF, feels she is not being well taken care of  Plan: OT evaluation, SW to assist with discharge planning  Offer ACP  BIMS 15/15 and SBT 4/28, recommend further testing  continue Citalopram 10mg QD      ( 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 Pompa last visit 5/12/25 Plt stable     (G47.33) CRISTIANA (obstructive sleep apnea)   Comment: acute/ongoing, no change  Plan: does not use CPAP, monitor SaO2 at rest and with activity    (G47.00) insomnia  Acute/ongoing  Plan: melatonin 5mg qhs      MED REC REQUIRED  Post Medication Reconciliation Status: medication reconcilation previously completed during another office visit      Orders:  Melatonin 5mg qhs    Total time with patient visit: 47 minutes including discussions about the POC and care coordination with the patient. Greater than 50% of total time spent with counseling and coordinating care due to reviewed nursing and therapy progress notes, medications, POC. Discussed POC, medications, insomnia and medications, chronic migraines and recommendations for medications, recommend patient f/u with neurology for medication management of migraines. .   Electronically signed by: Tonya Lynn Haase, APRN CNP

## 2025-06-06 ENCOUNTER — TELEPHONE (OUTPATIENT)
Dept: INTERNAL MEDICINE | Facility: CLINIC | Age: 85
End: 2025-06-06
Payer: COMMERCIAL

## 2025-06-06 ENCOUNTER — TELEPHONE (OUTPATIENT)
Dept: FAMILY MEDICINE | Facility: CLINIC | Age: 85
End: 2025-06-06
Payer: COMMERCIAL

## 2025-06-06 NOTE — TELEPHONE ENCOUNTER
Forms/Letter Request    Type of form/letter: Home Health Certification      Do we have the form/letter: Yes:     Who is the form from? Home care    Where did/will the form come from? form was faxed in    When is form/letter needed by:     How would you like the form/letter returned: Fax : [phone]    Lauren Waldrop, TC

## 2025-06-09 ENCOUNTER — PATIENT OUTREACH (OUTPATIENT)
Dept: CARE COORDINATION | Facility: CLINIC | Age: 85
End: 2025-06-09
Payer: COMMERCIAL

## 2025-06-09 DIAGNOSIS — Z09 HOSPITAL DISCHARGE FOLLOW-UP: ICD-10-CM

## 2025-06-09 NOTE — PROGRESS NOTES
Clinic Care Coordination Contact  [facility]/Voicemail    Clinical Data: Care Coordinator Outreach    Outreach Documentation Number of Outreach Attempt   4/28/2025   1:21 PM 1   6/9/2025   1:18 PM 1     Left message on patient's voicemail with call back information and requested return call.    Plan: Care Coordinator will try to reach patient again in 1-2 business days.    Crystal Lin, RN Care Coordinator  [facility] [place]  Email: [email or web address]  Phone: [phone]

## 2025-06-10 ENCOUNTER — TELEPHONE (OUTPATIENT)
Dept: FAMILY MEDICINE | Facility: CLINIC | Age: 85
End: 2025-06-10
Payer: COMMERCIAL

## 2025-06-10 ENCOUNTER — TELEPHONE (OUTPATIENT)
Dept: CARE COORDINATION | Facility: CLINIC | Age: 85
End: 2025-06-10
Payer: COMMERCIAL

## 2025-06-10 DIAGNOSIS — Z53.9 DIAGNOSIS NOT YET DEFINED: Primary | ICD-10-CM

## 2025-06-10 PROCEDURE — G0179 MD RECERTIFICATION HHA PT: HCPCS | Mod: 4MD | Performed by: NURSE PRACTITIONER

## 2025-06-10 RX ORDER — ERGOCALCIFEROL 1.25 MG/1
50000 CAPSULE, LIQUID FILLED ORAL WEEKLY
COMMUNITY
Start: 2025-06-10

## 2025-06-10 NOTE — TELEPHONE ENCOUNTER
MTM referral from: Transitions of Care (recent hospital discharge, TCU discharge, or ED visit)    MTM referral outreach attempt # 1 on Rosanna 10, 2025 at 12:06 PM      Outcome: Spoke with patient declined    Use ucare part d MAP for the carrier/Plan on the flowsheet      Lyly Rhodes CMA  MTM

## 2025-06-10 NOTE — TELEPHONE ENCOUNTER
Discharge paperwork [facility] reviewed and completed.      Home Health care Certification and Plan of Care reviewed and signed.      Paperwork place in TC basket.      BATSHEVA Joel CNP'

## 2025-06-10 NOTE — PROGRESS NOTES
Clinic Care Coordination Contact  [facility]/Voicemail    Clinical Data: Care Coordinator Outreach    Outreach Documentation Number of Outreach Attempt   4/28/2025   1:21 PM 1   6/9/2025   1:18 PM 1   6/10/2025   8:32 AM 2     Left message on patient's voicemail with call back information and requested return call.    Plan: Care Coordinator will try to reach patient again in 1 month.    Crystal Lin, RN Care Coordinator  [facility] [place]  Email: [email or web address]  Phone: [phone]

## 2025-06-10 NOTE — TELEPHONE ENCOUNTER
Patient requesting provider visit scheduled 6/11/25 be changed to telephone visit or rescheduled to a few weeks out so she has time to improve her strength.     Crystal Lin RN Care Coordinator  [facility] [place]  Email: [email or web address]  Phone: [phone]

## 2025-06-10 NOTE — TELEPHONE ENCOUNTER
Please call patient:      Can we do a video visit?  Video is best so I can see her.    If not then phone visit.      Thank you,   BATSHEVA Joel CNP

## 2025-06-10 NOTE — PROGRESS NOTES
"Clinic Care Coordination Contact  Transitions of Care Outreach  Chief Complaint   Patient presents with    Clinic Care Coordination - Follow-up    Clinic Care Coordination - Post Hospital    Clinic Care Coordination - Homecare/TCU     Most Recent Admission Date: 05/22/25  Most Recent Admission Diagnosis: \"acute cystitis with hematuria, generalized weakness\"  Most Recent Discharge Date: 06/07/25  Most Recent Discharge Diagnosis: \"Urinary tract infection without hematuria, site unspecified  (primary encounter diagnosis), (R53.81) Physical deconditioning, (M54.2) Acute neck pain, (M47.812) spondylosis  cervical, (I48.20) Chronic atrial fibrillation (H), (I11.0,  I50.32) Hypertensive heart disease with chronic diastolic congestive heart failure (H), (E11.22, N18.31) Type 2 diabetes mellitus with stage 3a chronic kidney disease, without long-term current use of insulin (H), (R41.89) Cognitive impairment, (F43.23) Adjustment disorder with mixed anxiety and depressed mood, ( D69.3) ITP,  (G47.33) CRISTIANA (obstructive sleep apnea), (G47.00) insomnia\"     Transitions of Care Assessment  Discharge Assessment  How are you doing now that you are home?: \"doing fair, home care came yesterday and coming for next visit on Thursday, a little weak still\"  How are your symptoms? (Red Flag symptoms escalate to triage hotline per guidelines): Improved  Do you know how to contact your clinic care team if you have future questions or changes to your health status? : Yes  Does the patient have their discharge instructions? : Yes  Does the patient have questions regarding their discharge instructions? : No  Were you started on any new medications or were there changes to any of your previous medications? : Yes  Does the patient have all of their medications?: Yes  Do you have questions regarding any of your medications? : No  Do you have all of your needed medical supplies or equipment (DME)?  (i.e. oxygen tank, CPAP, cane, etc.): Yes     "     Post-op (Clinicians Only)  Did the patient have surgery or a procedure: No    Follow up Plan   Discharge Follow-Up  Discharge follow up appointment scheduled in alignment with recommended follow up timeframe or Transitions of Risk Category? (Low = within 30 days; Moderate= within 14 days; High= within 7 days): Yes  Discharge Follow Up Appointment Date: 06/11/25  Discharge Follow Up Appointment Scheduled with?: Primary Care Provider    Future Appointments   Date Time Provider Department Center   6/11/2025  1:30 PM Roya Garza APRN CNP [facility] CR   7/17/2025  1:15 PM  [facility]   7/17/2025  2:00 PM Patsy Pompa MD [facility] RID   8/29/2025 12:00 AM DHILLON TECH1 [facility] CLIN     Outpatient Plan as outlined on AVS reviewed with patient.    For any urgent concerns, please contact our 24 hour nurse triage line: [phone] ([phone])       Crystal Lin RN    Follow Up Progress Note      Assessment: Patient states she is not able to afford the cost of assisted living which is $8,800/month and her daughter is working with [facility]  to complete paperwork for Elderly Waiver and will be sending paperwork to Primary Care Provider to move to the first floor in the interim. Patient unaware of scheduled provider discharge follow up tomorrow and requests RNCC to provide assistance with rescheduling this for a few weeks out so she will have time to improve her strength or if this could be completed as a telephone visit as she has done these types of visits in the past. Patient states she is not able to complete video visit due to issues with her phone which is less than a year old and daughter is planning to help her get a new phone in the near future.     Care Gaps:  Health Maintenance Due   Topic Date Due    HF ACTION PLAN  Never done    DEPRESSION ACTION PLAN  Never done    HEPATITIS A VACCINE (1 of 2 - Risk 2-dose series) Never done    ZOSTER VACCINE (1  of 2) Never done    HEPATITIS B VACCINE (1 of 3 - Risk 3-dose series) Never done    RSV VACCINE (1 - 1-dose 75+ series) Never done    MEDICARE ANNUAL WELLNESS VISIT  11/30/2021    DIABETIC FOOT EXAM  11/30/2021    MICROALBUMIN  09/22/2024     Care Gap Goal set: Yes    Care Plans  Care Plan: Utilization       Problem: Increased risk of re-admission       Long-Range Goal: I would like additional resources and support to manage my health and prevent future avoidable ED visits/hospital admissions       Start Date: 4/29/2025 Expected End Date: 2/27/2026    This Visit's Progress: 20%    Note:     Barriers: diagnosis of multiple, chronic, complex medical conditions, provider availability - wait time to complete appointments, etc.   Strengths: motivated, engaged in care coordination, participates in home care therapies   Patient expressed understanding of goal: Yes   Action steps to achieve this goal:  1. I will follow up with my providers as scheduled/recommended:   Primary Care Provider: 06/11/25  Cardiology: #[phone] TBD  Neurology: 07/29/2025 - will be rescheduling with Dr. Ragsdale.   Oncology: 07/17/2025  Infectious Disease Dr. Granados - 07/29/25   2. I will discuss, review, schedule & complete recommended overdue health maintenance with my Primary Care Provider.   3. I will take my medications as prescribed.   4. I will contact my care team with questions, concerns, support needs. I will use the clinic as a resource and I understand I can contact my clinic with 24/7 after hours services available. Care Coordinator will remain available as needed.                               Intervention/Education provided during outreach: As above. CC role, goal(s), clinic after hours, appointments, discussed/reviewed. Support provided.      Outreach Frequency: monthly, more frequently as needed    Plan:   Patient/caregiver will call RNCC with questions, concerns, support needs. RNCC will be available as needed.    CHW Care  Coordinator will follow up in 1 month. RNCC will review chart in 6 weeks.     Crystal Lin RN Care Coordinator  [facility] [place]  Email: [email or web address]  Phone: [phone]

## 2025-06-11 ENCOUNTER — MEDICAL CORRESPONDENCE (OUTPATIENT)
Dept: HEALTH INFORMATION MANAGEMENT | Facility: CLINIC | Age: 85
End: 2025-06-11

## 2025-06-11 NOTE — TELEPHONE ENCOUNTER
"Routing to PCP as FYI that upcoming appt is marked as \"video\" however noted that it is telephone virtual visit due to appointment system not allowing change.    RN attempted to change appointment to phone visit, however, it does not allow option. See below:        Patient is aware that she will be receiving a phone call for visit, and there is a note placed specifying to call patient instead of setting up video call. RN leaving appointment slot as virtual with note to call instead of video due inability to switch for \"ED/HOSP follow-up visit\".    JAMI GodinezN RN  [facility]  "

## 2025-06-11 NOTE — TELEPHONE ENCOUNTER
Yes, we can do a phone visit.    Please make today's visit a phone visit.     Thank you,   BATSHEVA Joel CNP

## 2025-06-11 NOTE — TELEPHONE ENCOUNTER
Called and spoke to patient.  - asked her if she can do a video visit- patient states she does not have the capability to do so at this time.  - decision tree did not allow visit mode to be telephone- changed to video however, patient needs a phone call to [phone].    Routing to provider as FYI.      Silvia LAIRD, - [place] [address]  [facility]

## 2025-06-12 ENCOUNTER — MEDICAL CORRESPONDENCE (OUTPATIENT)
Dept: HEALTH INFORMATION MANAGEMENT | Facility: CLINIC | Age: 85
End: 2025-06-12
Payer: COMMERCIAL

## 2025-06-16 ENCOUNTER — TELEPHONE (OUTPATIENT)
Dept: FAMILY MEDICINE | Facility: CLINIC | Age: 85
End: 2025-06-16
Payer: COMMERCIAL

## 2025-06-16 NOTE — TELEPHONE ENCOUNTER
BATSHEVA Castro CNP    Pt having issues with her Home PT and OT company-they are telling her she can't leave her home-she needs to be homebound.      Not seeing them anymore-she cancelled this.    Wondering if she can get therapy in our office 1-2x a week? She is also doing exercises once a week at home on Fridays in her apartment building- [email or web address].  She can ride the [employer] bus-and she can bring her scooter to the clinic.      Her daughter is still coming on Wednesday to her appointment as well.     Yadira Ventura RN BSN  Clinic Nurse  [facility]

## 2025-06-16 NOTE — TELEPHONE ENCOUNTER
FYI - Status Update    Who is Calling: Shannon (HOME CARE MANAGER)     Update: patient refuses services, discharging patient effective today (6/16/25)    Does caller want a call/response back: No

## 2025-06-17 ENCOUNTER — TELEPHONE (OUTPATIENT)
Dept: FAMILY MEDICINE | Facility: CLINIC | Age: 85
End: 2025-06-17
Payer: COMMERCIAL

## 2025-06-17 NOTE — TELEPHONE ENCOUNTER
Forms/Letter Request    Type of form/letter: Home Health Certification      Do we have the form/letter: Yes:     Who is the form from? Home [facility]    Where did/will the form come from? form was faxed in    When is form/letter needed by:     How would you like the form/letter returned: Fax : [phone]    Patient Notified form requests are processed in 5-7 business days:No    Could we [facility] this information to you [facility] or would you prefer to receive a phone call?:

## 2025-06-18 ENCOUNTER — OFFICE VISIT (OUTPATIENT)
Dept: FAMILY MEDICINE | Facility: CLINIC | Age: 85
End: 2025-06-18
Payer: COMMERCIAL

## 2025-06-18 ENCOUNTER — RESULTS FOLLOW-UP (OUTPATIENT)
Dept: ONCOLOGY | Facility: CLINIC | Age: 85
End: 2025-06-18

## 2025-06-18 ENCOUNTER — MEDICAL CORRESPONDENCE (OUTPATIENT)
Dept: HEALTH INFORMATION MANAGEMENT | Facility: CLINIC | Age: 85
End: 2025-06-18

## 2025-06-18 VITALS
DIASTOLIC BLOOD PRESSURE: 81 MMHG | TEMPERATURE: 98.2 F | RESPIRATION RATE: 14 BRPM | HEART RATE: 64 BPM | OXYGEN SATURATION: 95 % | SYSTOLIC BLOOD PRESSURE: 146 MMHG

## 2025-06-18 DIAGNOSIS — D69.3 IDIOPATHIC THROMBOCYTOPENIC PURPURA (H): ICD-10-CM

## 2025-06-18 DIAGNOSIS — R53.81 PHYSICAL DECONDITIONING: Primary | ICD-10-CM

## 2025-06-18 DIAGNOSIS — N18.31 TYPE 2 DIABETES MELLITUS WITH STAGE 3A CHRONIC KIDNEY DISEASE, WITHOUT LONG-TERM CURRENT USE OF INSULIN (H): ICD-10-CM

## 2025-06-18 DIAGNOSIS — R06.83 SNORING: ICD-10-CM

## 2025-06-18 DIAGNOSIS — G47.33 OSA (OBSTRUCTIVE SLEEP APNEA): ICD-10-CM

## 2025-06-18 DIAGNOSIS — E11.22 TYPE 2 DIABETES MELLITUS WITH STAGE 3A CHRONIC KIDNEY DISEASE, WITHOUT LONG-TERM CURRENT USE OF INSULIN (H): ICD-10-CM

## 2025-06-18 DIAGNOSIS — N18.32 STAGE 3B CHRONIC KIDNEY DISEASE (H): ICD-10-CM

## 2025-06-18 DIAGNOSIS — M62.81 MUSCLE WEAKNESS (GENERALIZED): ICD-10-CM

## 2025-06-18 DIAGNOSIS — B37.2 YEAST INFECTION OF THE SKIN: ICD-10-CM

## 2025-06-18 LAB
BASOPHILS # BLD AUTO: 0 10E3/UL (ref 0–0.2)
BASOPHILS NFR BLD AUTO: 1 %
EOSINOPHIL # BLD AUTO: 0.2 10E3/UL (ref 0–0.7)
EOSINOPHIL NFR BLD AUTO: 4 %
ERYTHROCYTE [DISTWIDTH] IN BLOOD BY AUTOMATED COUNT: 13.5 % (ref 10–15)
EST. AVERAGE GLUCOSE BLD GHB EST-MCNC: 123 MG/DL
HBA1C MFR BLD: 5.9 % (ref 0–5.6)
HCT VFR BLD AUTO: 37.9 % (ref 35–47)
HGB BLD-MCNC: 12.7 G/DL (ref 11.7–15.7)
IMM GRANULOCYTES # BLD: 0 10E3/UL
IMM GRANULOCYTES NFR BLD: 0 %
LYMPHOCYTES # BLD AUTO: 1.6 10E3/UL (ref 0.8–5.3)
LYMPHOCYTES NFR BLD AUTO: 32 %
MCH RBC QN AUTO: 30.8 PG (ref 26.5–33)
MCHC RBC AUTO-ENTMCNC: 33.5 G/DL (ref 31.5–36.5)
MCV RBC AUTO: 92 FL (ref 78–100)
MONOCYTES # BLD AUTO: 0.6 10E3/UL (ref 0–1.3)
MONOCYTES NFR BLD AUTO: 13 %
NEUTROPHILS # BLD AUTO: 2.5 10E3/UL (ref 1.6–8.3)
NEUTROPHILS NFR BLD AUTO: 51 %
PLATELET # BLD AUTO: 104 10E3/UL (ref 150–450)
RBC # BLD AUTO: 4.12 10E6/UL (ref 3.8–5.2)
WBC # BLD AUTO: 4.9 10E3/UL (ref 4–11)

## 2025-06-18 PROCEDURE — 36415 COLL VENOUS BLD VENIPUNCTURE: CPT | Performed by: NURSE PRACTITIONER

## 2025-06-18 PROCEDURE — 3079F DIAST BP 80-89 MM HG: CPT | Performed by: NURSE PRACTITIONER

## 2025-06-18 PROCEDURE — 83036 HEMOGLOBIN GLYCOSYLATED A1C: CPT | Performed by: NURSE PRACTITIONER

## 2025-06-18 PROCEDURE — G2211 COMPLEX E/M VISIT ADD ON: HCPCS | Performed by: NURSE PRACTITIONER

## 2025-06-18 PROCEDURE — 3044F HG A1C LEVEL LT 7.0%: CPT | Performed by: NURSE PRACTITIONER

## 2025-06-18 PROCEDURE — 99214 OFFICE O/P EST MOD 30 MIN: CPT | Performed by: NURSE PRACTITIONER

## 2025-06-18 PROCEDURE — 3077F SYST BP >= 140 MM HG: CPT | Performed by: NURSE PRACTITIONER

## 2025-06-18 PROCEDURE — 85025 COMPLETE CBC W/AUTO DIFF WBC: CPT | Performed by: NURSE PRACTITIONER

## 2025-06-18 RX ORDER — NYSTATIN 100000 [USP'U]/G
POWDER TOPICAL 3 TIMES DAILY
Qty: 60 G | Refills: 1 | Status: SHIPPED | OUTPATIENT
Start: 2025-06-18 | End: 2025-06-28

## 2025-06-18 SDOH — HEALTH STABILITY: PHYSICAL HEALTH: ON AVERAGE, HOW MANY DAYS PER WEEK DO YOU ENGAGE IN MODERATE TO STRENUOUS EXERCISE (LIKE A BRISK WALK)?: 0 DAYS

## 2025-06-18 ASSESSMENT — PATIENT HEALTH QUESTIONNAIRE - PHQ9
SUM OF ALL RESPONSES TO PHQ QUESTIONS 1-9: 0
10. IF YOU CHECKED OFF ANY PROBLEMS, HOW DIFFICULT HAVE THESE PROBLEMS MADE IT FOR YOU TO DO YOUR WORK, TAKE CARE OF THINGS AT HOME, OR GET ALONG WITH OTHER PEOPLE: NOT DIFFICULT AT ALL
SUM OF ALL RESPONSES TO PHQ QUESTIONS 1-9: 0

## 2025-06-18 ASSESSMENT — SOCIAL DETERMINANTS OF HEALTH (SDOH): HOW OFTEN DO YOU GET TOGETHER WITH FRIENDS OR RELATIVES?: TWICE A WEEK

## 2025-06-18 NOTE — PATIENT INSTRUCTIONS
Take your meds as prescribed.    Scripts / refills sent to your pharmacy.     Labs today.     Call to confirm Cardiology appt.      Call to get Neurology appt.      Think about Urology and let me know if you want the order.      Start PT and OT in person.      Keep skin clean and dry.    Place towel in between skin to prevent skin to skin touching.      Increase water (64 ounces per day), fruits, and vegetables.    Exercise at least 5 days per week for 30 minutes each day.  Walking daily.     Follow-up 2 weeks for skin check.      Call or MyChart as needed as well.

## 2025-06-18 NOTE — TELEPHONE ENCOUNTER
Reviewed.    Saw patient in clinic today.    Ordered PT and OT to be done in clinic.      BATSHEVA Joel CNP

## 2025-06-18 NOTE — TELEPHONE ENCOUNTER
Reviewed.  Saw patient today.    Would like to try PT and OT in clinic.    Morning Sun that she got better results in clinic.        BATSHEVA Joel CNP

## 2025-06-18 NOTE — PROGRESS NOTES
Assessment & Plan     (R53.81) Physical deconditioning  (primary encounter diagnosis)  Comment: Chronic.  Persistent.  Would like to see PT in outside facility as she feels like she can get better PT.  PT and OT orders placed.   Plan: Physical Therapy  Referral,         Occupational Therapy  Referral    (M62.81) Muscle weakness (generalized)  Comment: Chronic.  Persistent.  See comments above.  Plan: Physical Therapy  Referral,         Occupational Therapy  Referral    (B37.2) Yeast infection of the skin  Comment: Acute.  New onset.  Start Nystatin powder.  Keep skin clean and dry.  Soft cloth in between skin to prevent touching.  See patient instructions.    Plan: nystatin (MYCOSTATIN) 123071 UNIT/GM external         powder    (G47.33) CRISTIANA (obstructive sleep apnea)   Comment: Chronic.  Persistent.  Would like a new evaluatoin as its been quite some time.    (E11.22,  N18.31) Type 2 diabetes mellitus with stage 3a chronic kidney disease, without long-term current use of insulin (H)  Comment: Chronic.  Stable.  Previous A1c 6.6.  Will check today.  Plan: Hemoglobin A1c, Albumin Random Urine         Quantitative with Creat Ratio    (N18.32) Stage 3b chronic kidney disease (H)  Comment: Chronic.  Persistent.  Creatinine increased a bit in May 2025.  Will recheck in near future.  Recommend increased water intake.    (D69.3) Idiopathic thrombocytopenic purpura (H)  Comment: Chronic.  Persistent.  Follows Heme / Onc. Will see them in near future.  Future labs in system.       Patient Instructions   Take your meds as prescribed.    Scripts / refills sent to your pharmacy.     Labs today.     Call to confirm Cardiology appt.      Call to get Neurology appt.      Think about Urology and let me know if you want the order.      Start PT and OT in person.      Keep skin clean and dry.    Place towel in between skin to prevent skin to skin touching.      Increase water (64 ounces per day),  "fruits, and vegetables.    Exercise at least 5 days per week for 30 minutes each day.  Walking daily.     Follow-up 2 weeks for skin check.      Call or MyChart as needed as well.         The longitudinal plan of care for the diagnosis(es)/condition(s) as documented were addressed during this visit. Due to the added complexity in care, I will continue to support Savanna in the subsequent management and with ongoing continuity of care.      Patient has been advised of split billing requirements and indicates understanding: Yes      MED REC REQUIRED  Post Medication Reconciliation Status: discharge medications reconciled, continue medications without change    Counseling  Appropriate preventive services were addressed with this patient via screening, questionnaire, or discussion as appropriate for fall prevention, nutrition, physical activity, Tobacco-use cessation, social engagement, weight loss and cognition.  Checklist reviewing preventive services available has been given to the patient.  Reviewed patient's diet, addressing concerns and/or questions.   Updated plan of care.  Patient reported difficulty with activities of daily living were addressed today.Information on urinary incontinence and treatment options given to patient.         Subjective   Savanna is a 84 year old, presenting for the following health issues:  Hospital F/U        6/18/2025    11:01 AM   Additional Questions   Roomed by foster tolbert   Accompanied by daughter cedric     History of Present Illness       Reason for visit:  FollowShe exercises with enough effort to increase her heart rate 9 or less minutes per day.  She exercises with enough effort to increase her heart rate 3 or less days per week.   She is taking medications regularly.          6/10/2025   Post Discharge Outreach   How are you doing now that you are home? \"doing fair, home care came yesterday and coming for next visit on Thursday, a little weak still\"   How are your symptoms? " (Red Flag symptoms escalate to triage hotline per guidelines) Improved   Does the patient have their discharge instructions?  Yes   Does the patient have questions regarding their discharge instructions?  No   Were you started on any new medications or were there changes to any of your previous medications?  Yes   Does the patient have all of their medications? Yes   Do you have questions regarding any of your medications?  No   Do you have all of your needed medical supplies or equipment (DME)?  (i.e. oxygen tank, CPAP, cane, etc.) Yes   Discharge Follow Up Appointment Date 6/11/2025   Discharge Follow Up Appointment Scheduled with? Primary Care Provider       Hospital Follow-up Visit:  Hospital/Nursing Home/IP Rehab Facility: [facility]  Most Recent Admission Date: 5/21/2025   Most Recent Admission Diagnosis:      Most Recent Discharge Date: 5/22/2025   Most Recent Discharge Diagnosis: Acute cystitis with hematuria - N30.01  Generalized weakness - R53.1  Other specified counseling - Z71.89   Do you have any other stressors you would like to discuss with your provider? OTHER: letter about moving to first floor never came    Problems taking medications regularly:  None  Medication changes since discharge: None  Problems adhering to non-medication therapy:  None    Summary of hospitalization:  [facility] discharge summary reviewed  Diagnostic Tests/Treatments reviewed.  Follow up needed: Cardiology and Neurology.  Will think about Urology for near future.   Other Healthcare Providers Involved in Patient s Care:         Daughter was present at visit today.   Update since discharge: improved.         Plan of care communicated with patient and daughter           Review of Systems  Constitutional, neuro, ENT, endocrine, pulmonary, cardiac, gastrointestinal, genitourinary, musculoskeletal, integument and psychiatric systems are negative, except as otherwise noted.        Objective     BP (!) 146/81 (BP Location: Left arm, Patient Position: Sitting, Cuff Size: Adult Small)   Pulse 64   Temp 98.2  F (36.8  C) (Oral)   Resp 14   LMP  (LMP Unknown)   SpO2 95%   There is no height or weight on file to calculate BMI.  Physical Exam  Constitutional:       General: She is not in acute distress.     Appearance: Normal appearance. She is not ill-appearing.   HENT:      Head: Normocephalic.   Cardiovascular:      Rate and Rhythm: Normal rate and regular rhythm.      Heart sounds: Normal heart sounds. No murmur heard.     No friction rub. No gallop.   Pulmonary:      Effort: Pulmonary effort is normal. No respiratory distress.      Breath sounds: Normal breath sounds. No wheezing, rhonchi or rales.   Musculoskeletal:      Cervical back: Normal range of motion and neck supple. No rigidity.   Neurological:      General: No focal deficit present.      Mental Status: She is alert.   Psychiatric:         Mood and Affect: Mood normal.         Behavior: Behavior normal.         Thought Content: Thought content normal.         Judgment: Judgment normal.                Signed Electronically by: BATSHEVA Joel CNP

## 2025-06-23 ENCOUNTER — NURSE TRIAGE (OUTPATIENT)
Dept: FAMILY MEDICINE | Facility: CLINIC | Age: 85
End: 2025-06-23
Payer: COMMERCIAL

## 2025-06-23 ENCOUNTER — PATIENT OUTREACH (OUTPATIENT)
Dept: CARE COORDINATION | Facility: CLINIC | Age: 85
End: 2025-06-23
Payer: COMMERCIAL

## 2025-06-23 NOTE — TELEPHONE ENCOUNTER
Roya Garza, BATSHEVA CNP    Pt states she is not confused and doesn't know why that is on her chart.  She thinks you may have her confused with someone else.  She is very adamant about this and upset that is on there.    Also, she is stopping her Celexa due to reaction.     Nurse Triage SBAR    Is this a 2nd Level Triage? YES, LICENSED PRACTITIONER REVIEW IS REQUIRED    Situation: Potential allergic reaction    Background: Pt has long list of allergies. Has Epi pen for shellfish.   Started taking celexa again on Thursday-thinks she is reacting to this.      Assessment:   Pt complaining of mild difficulty breathing-breathing feels shallow and just like she can't get a deep breath. Started maybe yesterday.   Her face is bright red-worsening over the past few days.    Her cheeks and forehead are swollen.    She is itchy all over.    She also has a rash under right breast and on her mid drift but this has been there-she's using nystatin cream for this.    Protocol Recommended Disposition:   Call  Now    Recommendation: Recommended to call 911, pt did not want to do this.  She states she is tired of being in the hospital. Wants to take a benadryl and wait an hour and see if this helps. Huddled with the provider, Dr. Britton.  States she can take a benadryl if she wants. If helps, great, if not she should go to the ED.  Instructed to go to ED if develops worsening symptoms or does not improve.  Instructed to stop citalopram (Celexa) She is in agreement with this.    Routed to provider    Does the patient meet one of the following criteria for ADS visit consideration? 16+ years old, with an [facility] PCP     TIP  Providers, please consider if this condition is appropriate for management at one of our Acute and Diagnostic Services sites.     If patient is a good candidate, please use dotphrase <dot>triageresponse and select Refer to ADS to document.     Reason for Disposition   Difficulty breathing or  wheezing    Additional Information   Negative: Life-threatening reaction (anaphylaxis) in the past to similar substance (e.g., food, insect bite/sting, chemical, etc.) and < 2 hours since exposure   Negative: Unresponsive, passed out or very weak   Negative: Swollen tongue   Negative: Life-threatening allergic reaction (anaphylaxis) suspected    Protocols used: Allergic Reactions - Guideline [id number], Face Swelling-A-OH    Yadira Ventura RN BSN  Clinic Nurse  [facility]

## 2025-06-27 ENCOUNTER — TELEPHONE (OUTPATIENT)
Dept: FAMILY MEDICINE | Facility: CLINIC | Age: 85
End: 2025-06-27
Payer: COMMERCIAL

## 2025-06-27 NOTE — TELEPHONE ENCOUNTER
Please call daughter and patient to relay message.  (Paperwork is on my desk in basket).     I have Home Health Care orders to sign, but you need to be homebound to continue with these orders.  We talked about this in clinic on 6-18-25.      This means that you can not go outside your home for PT and OT.  You need to do this in your home.      I recommend that you continue with Home Health Care and PT / OT at home so you do not lose services.    I cannot sign these orders until you are certain about this.     I'm out of the office this upcoming week.     You see me again on July 8th.      Let me know how to proceed.        Take care,   BATSHEVA Joel CNP

## 2025-06-30 DIAGNOSIS — T78.3XXA ANGIOEDEMA, INITIAL ENCOUNTER: ICD-10-CM

## 2025-06-30 NOTE — TELEPHONE ENCOUNTER
Pt calling.    She is requesting refill on her epi-pen as previous rx is  at pharmacy.     She also is wondering why confusion is documented in her chart. She does not feel like she is confused and would like to know when and why that was documented and who has documented that. She reports that she still pays her own bills and that all her friends and family feel she is very sharp. She informs she will discuss this further with PCP Roya Garza APRN at upcoming visit on 25.    T'd up med and pharmacy and routing refill request to refill pool to be processed in order.    Juliane SHIPMAN RN     details…

## 2025-06-30 NOTE — TELEPHONE ENCOUNTER
Patient calling back. Relayed provider message below.    States she does not want PT in home and has other appointments outside of the home. [facility] [place] is hard for her but has called [facility] and will paying for rides as will be seen 7/10 for PT and OT in [place].    [facility] started hr on [employer], getting 7 meals, fruits, juices weekly.      does not want to be secluded in her home as this is not good for her.     Patient is frustrated we called her daughter and not her. States she is being told she is confused, denies this and states she is improving and not confused, doesn't have dementia.     Confirmed OV 7/8/25 with Roya Garza. States her daughter cannot come to this but her friend will be accompanying. Instructed patient to call [phone] for new or worsening symptoms or further questions. Patient verbalized understanding and agrees with the plan.     Roya Garza please review and advise.     Milli Zaman RN

## 2025-07-01 RX ORDER — EPINEPHRINE 0.3 MG/.3ML
0.3 INJECTION SUBCUTANEOUS PRN
Qty: 2 EACH | Refills: 3 | Status: SHIPPED | OUTPATIENT
Start: 2025-07-01

## 2025-07-02 ENCOUNTER — MEDICAL CORRESPONDENCE (OUTPATIENT)
Dept: HEALTH INFORMATION MANAGEMENT | Facility: CLINIC | Age: 85
End: 2025-07-02
Payer: COMMERCIAL

## 2025-07-03 NOTE — TELEPHONE ENCOUNTER
Spoke with patient. Relayed provider's message as written. Patient verbalized understanding and has no further questions at this time.    JAMI GodinezN RN  [facility]

## 2025-07-03 NOTE — TELEPHONE ENCOUNTER
Roya is ooo. Lets just have patient follow up w/ Roya at upcoming visit to discuss further if necessary.     BATSHEVA Rodrigues CNP

## 2025-07-08 ENCOUNTER — MEDICAL CORRESPONDENCE (OUTPATIENT)
Dept: HEALTH INFORMATION MANAGEMENT | Facility: CLINIC | Age: 85
End: 2025-07-08

## 2025-07-08 ENCOUNTER — OFFICE VISIT (OUTPATIENT)
Dept: FAMILY MEDICINE | Facility: CLINIC | Age: 85
End: 2025-07-08
Payer: COMMERCIAL

## 2025-07-08 VITALS
OXYGEN SATURATION: 94 % | RESPIRATION RATE: 17 BRPM | BODY MASS INDEX: 34.81 KG/M2 | SYSTOLIC BLOOD PRESSURE: 116 MMHG | HEIGHT: 68 IN | TEMPERATURE: 97.9 F | WEIGHT: 229.7 LBS | DIASTOLIC BLOOD PRESSURE: 76 MMHG | HEART RATE: 81 BPM

## 2025-07-08 DIAGNOSIS — B37.2 YEAST INFECTION OF THE SKIN: Primary | ICD-10-CM

## 2025-07-08 DIAGNOSIS — F43.23 ADJUSTMENT DISORDER WITH MIXED ANXIETY AND DEPRESSED MOOD: ICD-10-CM

## 2025-07-08 DIAGNOSIS — M62.81 MUSCLE WEAKNESS (GENERALIZED): ICD-10-CM

## 2025-07-08 DIAGNOSIS — Z53.9 DIAGNOSIS NOT YET DEFINED: Primary | ICD-10-CM

## 2025-07-08 DIAGNOSIS — N18.32 CHRONIC KIDNEY DISEASE (CKD) STAGE G3B/A1, MODERATELY DECREASED GLOMERULAR FILTRATION RATE (GFR) BETWEEN 30-44 ML/MIN/1.73 SQUARE METER AND ALBUMINURIA CREATININE RATIO LESS THAN 30 MG/G (H): ICD-10-CM

## 2025-07-08 DIAGNOSIS — B37.2 YEAST INFECTION OF THE SKIN: ICD-10-CM

## 2025-07-08 DIAGNOSIS — R53.81 PHYSICAL DECONDITIONING: ICD-10-CM

## 2025-07-08 PROCEDURE — 3078F DIAST BP <80 MM HG: CPT | Performed by: NURSE PRACTITIONER

## 2025-07-08 PROCEDURE — 82043 UR ALBUMIN QUANTITATIVE: CPT | Performed by: NURSE PRACTITIONER

## 2025-07-08 PROCEDURE — 99214 OFFICE O/P EST MOD 30 MIN: CPT | Performed by: NURSE PRACTITIONER

## 2025-07-08 PROCEDURE — G0180 MD CERTIFICATION HHA PATIENT: HCPCS | Performed by: NURSE PRACTITIONER

## 2025-07-08 PROCEDURE — G2211 COMPLEX E/M VISIT ADD ON: HCPCS | Performed by: NURSE PRACTITIONER

## 2025-07-08 PROCEDURE — 82570 ASSAY OF URINE CREATININE: CPT | Performed by: NURSE PRACTITIONER

## 2025-07-08 PROCEDURE — 3074F SYST BP LT 130 MM HG: CPT | Performed by: NURSE PRACTITIONER

## 2025-07-08 RX ORDER — CITALOPRAM HYDROBROMIDE 10 MG/1
10 TABLET ORAL DAILY
Qty: 30 TABLET | Refills: 2 | Status: SHIPPED | OUTPATIENT
Start: 2025-07-08

## 2025-07-08 RX ORDER — NYSTATIN 100000 [USP'U]/G
POWDER TOPICAL 3 TIMES DAILY
Qty: 60 G | Refills: 2 | Status: SHIPPED | OUTPATIENT
Start: 2025-07-08 | End: 2025-07-09

## 2025-07-08 RX ORDER — NYSTATIN 100000 [USP'U]/G
POWDER TOPICAL
COMMUNITY
End: 2025-07-08

## 2025-07-08 NOTE — TELEPHONE ENCOUNTER
Medication passed protocol, however, refill RN could not approve because provider needs to review pharmacy/patient note. Provider, please approve or deny the prescription.    Latia Ortez RN on 7/8/2025 at 1:17 PM

## 2025-07-08 NOTE — PATIENT INSTRUCTIONS
Take your meds as prescribed.    Scripts / refills sent to your pharmacy.     Nystatin power three times per day fro 10 days.      Start PT and OT at home.      Continue with home care nurse.      Increase water (64 ounces per day), fruits, and vegetables.    Exercise at least 5 days per week for 30 minutes each day.     Follow-up with me after 9-18-25; diabetes check, med check, skin check.     Follow-up sooner as needed.     Call or MyChart as needed as well.

## 2025-07-08 NOTE — PROGRESS NOTES
Assessment & Plan     (B37.2) Yeast infection of the skin  (primary encounter diagnosis)  Comment: Subacute.  Persistent.  Start Nystatin powder in groin area TID for 10 days.  Skin under breasts has cleared.  Plan: DISCONTINUED: nystatin (MYCOSTATIN) 826248         UNIT/GM external powder    (R53.81) Physical deconditioning  Comment: Chronic.  Persistent.  Needs to continue with homecare nurse.  Will order PT and OT for home.  Plan: Home Care Referral    (M62.81) Muscle weakness (generalized)  Comment: Chronic.  Persistent.  Start PT and OT at home.  Difficulty with walking long distances and mobility.  Plan: Home Care Referral    (F43.23) Adjustment disorder with mixed anxiety and depressed mood  Comment: Chronic.  Persistent.  Recommend that she continue with the citalopram as prescribed.  Plan: citalopram (CELEXA) 10 MG tablet    (N18.32) Chronic kidney disease (CKD) stage G3b/A1, moderately decreased glomerular filtration rate (GFR) between 30-44 mL/min/1.73 square meter and albuminuria creatinine ratio less than 30 mg/g (H)  Comment: Chronic.  Persistent.  Continue to monitor.  Plan: Albumin Random Urine Quantitative with Creat         Ratio     All home health care orders completed today and will be faxed.          Patient Instructions   Take your meds as prescribed.    Scripts / refills sent to your pharmacy.     Nystatin power three times per day fro 10 days.      Start PT and OT at home.      Continue with home care nurse.      Increase water (64 ounces per day), fruits, and vegetables.    Exercise at least 5 days per week for 30 minutes each day.     Follow-up with me after 9-18-25; diabetes check, med check, skin check.     Follow-up sooner as needed.     Call or MyChart as needed as well.        The longitudinal plan of care for the diagnosis(es)/condition(s) as documented were addressed during this visit. Due to the added complexity in care, I will continue to support Savanna in the subsequent  "management and with ongoing continuity of care.        Subjective   Savanna is a 84 year old, presenting for the following health issues:  Derm Problem (2 week follow up-skin check under right breast)        7/8/2025    11:25 AM   Additional Questions   Roomed by foster tolbert   Accompanied by daughter cedric     History of Present Illness       Reason for visit:  Follow up check skin check under right breast, feetShe exercises with enough effort to increase her heart rate 9 or less minutes per day.  She exercises with enough effort to increase her heart rate 3 or less days per week.   She is taking medications regularly.      Follow up on skin check under right breast  Patient says it has cleared up since last visit     Rash  Onset/Duration: 3-4 days ago  Description  Location: on groan area  Character: round, red  Itching: mild  Intensity:  mild  Progression of Symptoms:  same  History:           Previous episodes of similar rash: under right  breast 2 weeks ago  New exposures:  None  Recent travel: No  Exposure to similar rash: No  Precipitating or alleviating factors: none  Therapies tried and outcome: using a powder that is helping. Prescribed for the rash under breast      Discussed pills and doesn't want to take citalopram.  Believes that she is not absorbing it.  Brought in the pill left over in her stool on three different occasions.  She should discuss this with GI at her upcoming appointment in September.       Review of Systems  Constitutional, neuro, ENT, endocrine, pulmonary, cardiac, gastrointestinal, genitourinary, musculoskeletal, integument and psychiatric systems are negative, except as otherwise noted.        Objective    /76 (BP Location: Right arm, Patient Position: Sitting, Cuff Size: Adult Small)   Pulse 81   Temp 97.9  F (36.6  C) (Oral)   Resp 17   Ht 1.727 m (5' 8\")   Wt 104.2 kg (229 lb 11.2 oz)   LMP  (LMP Unknown)   SpO2 94%   BMI 34.93 kg/m    Body mass index is 34.93 " kg/m .  Physical Exam  Constitutional:       General: She is not in acute distress.     Appearance: Normal appearance. She is not ill-appearing.   HENT:      Head: Normocephalic.   Cardiovascular:      Rate and Rhythm: Normal rate and regular rhythm.      Heart sounds: Normal heart sounds. No murmur heard.     No friction rub. No gallop.   Pulmonary:      Effort: Pulmonary effort is normal. No respiratory distress.      Breath sounds: Normal breath sounds. No wheezing, rhonchi or rales.   Musculoskeletal:      Cervical back: Normal range of motion and neck supple. No rigidity.   Skin:     General: Skin is warm and dry.      Findings: Erythema and rash present.      Comments: Both sides of groin with redness on both sides.  Skin under both breasts is clear.    Neurological:      General: No focal deficit present.      Mental Status: She is alert.   Psychiatric:         Mood and Affect: Mood normal.         Behavior: Behavior normal.         Thought Content: Thought content normal.         Judgment: Judgment normal.              Signed Electronically by: BATSHEVA Joel CNP

## 2025-07-09 LAB
CREAT UR-MCNC: 70.5 MG/DL
MICROALBUMIN UR-MCNC: 12.6 MG/L
MICROALBUMIN/CREAT UR: 17.87 MG/G CR (ref 0–25)

## 2025-07-09 RX ORDER — NYSTATIN 100000 [USP'U]/G
POWDER TOPICAL 2 TIMES DAILY
Qty: 60 G | Refills: 2 | Status: SHIPPED | OUTPATIENT
Start: 2025-07-09 | End: 2025-07-10

## 2025-07-10 ENCOUNTER — TELEPHONE (OUTPATIENT)
Dept: FAMILY MEDICINE | Facility: CLINIC | Age: 85
End: 2025-07-10
Payer: COMMERCIAL

## 2025-07-10 RX ORDER — NYSTATIN 100000 [USP'U]/G
POWDER TOPICAL 2 TIMES DAILY
Qty: 60 G | Refills: 2 | Status: SHIPPED | OUTPATIENT
Start: 2025-07-10

## 2025-07-10 NOTE — TELEPHONE ENCOUNTER
Home Care is calling regarding an established patient with [facility].  Requesting orders from: Roya Garza RN APPROVED: RN able to provide verbal orders.  Home Care will send orders for signature.  RN will close encounter.  Is this a request for a temporary pause in the home care episode?  No    Orders Requested    Skilled Nursing  Request for delay in care, service to be provided within 7 days of previously   Service rescheduled to 7/14/25    RN gave verbal order: Yes        Phone number Home Care can be reached at: [phone] x [phone]  Okay to leave a detailed message?: Yes      Dottie Oconnor RN

## 2025-07-14 ENCOUNTER — OFFICE VISIT (OUTPATIENT)
Dept: FAMILY MEDICINE | Facility: CLINIC | Age: 85
End: 2025-07-14
Payer: COMMERCIAL

## 2025-07-14 ENCOUNTER — TELEPHONE (OUTPATIENT)
Dept: FAMILY MEDICINE | Facility: CLINIC | Age: 85
End: 2025-07-14

## 2025-07-14 VITALS
DIASTOLIC BLOOD PRESSURE: 78 MMHG | OXYGEN SATURATION: 95 % | HEIGHT: 68 IN | SYSTOLIC BLOOD PRESSURE: 136 MMHG | TEMPERATURE: 98 F | BODY MASS INDEX: 35.46 KG/M2 | HEART RATE: 80 BPM | WEIGHT: 234 LBS | RESPIRATION RATE: 16 BRPM

## 2025-07-14 DIAGNOSIS — I50.9 CONGESTIVE HEART FAILURE, UNSPECIFIED HF CHRONICITY, UNSPECIFIED HEART FAILURE TYPE (H): ICD-10-CM

## 2025-07-14 DIAGNOSIS — E11.22 TYPE 2 DIABETES MELLITUS WITH STAGE 3A CHRONIC KIDNEY DISEASE, WITHOUT LONG-TERM CURRENT USE OF INSULIN (H): ICD-10-CM

## 2025-07-14 DIAGNOSIS — B35.1 ONYCHOMYCOSIS: Primary | ICD-10-CM

## 2025-07-14 DIAGNOSIS — H61.21 IMPACTED CERUMEN OF RIGHT EAR: ICD-10-CM

## 2025-07-14 DIAGNOSIS — I11.0 HYPERTENSIVE HEART DISEASE WITH CHRONIC DIASTOLIC CONGESTIVE HEART FAILURE (H): ICD-10-CM

## 2025-07-14 DIAGNOSIS — I50.32 HYPERTENSIVE HEART DISEASE WITH CHRONIC DIASTOLIC CONGESTIVE HEART FAILURE (H): ICD-10-CM

## 2025-07-14 DIAGNOSIS — G47.33 OSA (OBSTRUCTIVE SLEEP APNEA): ICD-10-CM

## 2025-07-14 DIAGNOSIS — K74.60 HEPATIC CIRRHOSIS, UNSPECIFIED HEPATIC CIRRHOSIS TYPE, UNSPECIFIED WHETHER ASCITES PRESENT (H): ICD-10-CM

## 2025-07-14 DIAGNOSIS — N18.31 TYPE 2 DIABETES MELLITUS WITH STAGE 3A CHRONIC KIDNEY DISEASE, WITHOUT LONG-TERM CURRENT USE OF INSULIN (H): ICD-10-CM

## 2025-07-14 PROCEDURE — 99214 OFFICE O/P EST MOD 30 MIN: CPT

## 2025-07-14 PROCEDURE — 3075F SYST BP GE 130 - 139MM HG: CPT

## 2025-07-14 PROCEDURE — 3078F DIAST BP <80 MM HG: CPT

## 2025-07-14 PROCEDURE — 99207 PR FOOT EXAM NO CHARGE: CPT

## 2025-07-14 RX ORDER — CICLOPIROX 80 MG/ML
SOLUTION TOPICAL
Qty: 6.6 ML | Refills: 11 | Status: SHIPPED | OUTPATIENT
Start: 2025-07-14

## 2025-07-14 NOTE — PROGRESS NOTES
Assessment & Plan     (B35.1) Onychomycosis  (primary encounter diagnosis)  (K74.60) Hepatic cirrhosis, unspecified hepatic cirrhosis type, unspecified whether ascites present (H)  Comment: will treat w/ Ciclopirox. Not a great candidate for oral treatment given hx of cirrhosis. Discussed medication risks and benefits of ciclopirox with patient in detail with patient verbal understanding. Provided patient list of places she can go to get routine diabetic foot care performed. Patient fully understands and is agreeable with plan of care, at this point patient will follow up as needed unless acute concerns arise in the meantime.  Plan: ciclopirox (PENLAC) 8 % external solution    (I50.9) Congestive heart failure, unspecified HF chronicity, unspecified heart failure type (H)  (I11.0,  I50.32) Hypertensive heart disease with chronic diastolic congestive heart failure (H)  Comment: follows cardiology. 5 lb weight gain noted over the past week. Currently on lasix. Swelling noted to bilateral feet/ankles. Will message cardiology as patient has upcoming visit in a couple weeks.     (G47.33) CRISTIANA (obstructive sleep apnea)   Comment: chronic. Not controlled. not compliant w/ CPAP, does not tolerate. Will send to sleep for re eval of sleep apnea to discuss other options to treat.   Plan: Adult Sleep Eval & Management          Referral    (H61.21) Impacted cerumen of right ear  Comment: removed impacted ear wax w/ lighted curette. Patient tolerated well. Following,  TM pearly gray, cone of light noted, bony structures visible. No infection noted.   Plan: REMOVE IMPACTED EAR WAX     (E11.22,  N18.31) Type 2 diabetes mellitus with stage 3a chronic kidney disease, without long-term current use of insulin (H)  Comment: foot exam performed today. Diabetes stable, well controlled.   Plan: FOOT EXAM      Mamadou Corrales is a 84 year old, presenting for the following health issues:  Nail Problem (Possible fungal infection,  left great toe painful when bumped)        2025    11:15 AM   Additional Questions   Roomed by Kasey VALENTE           Concern - Toe nail   Onset: for a while  Description: bilateral great toes-possible fungal?, change of color  Intensity: mild  Progression of Symptoms:  worsening  Accompanying Signs & Symptoms: L toe painful  Previous history of similar problem: has had toenail fungus years ago  Precipitating factors:        Worsened by: when bumped  Alleviating factors:        Improved by: nothing  Therapies tried and outcome:  none     Review of Systems  Constitutional, HEENT, cardiovascular, pulmonary, GI, , musculoskeletal, neuro, skin, endocrine and psych systems are negative, except as otherwise noted.      Objective    LMP  (LMP Unknown)   There is no height or weight on file to calculate BMI.  Physical Exam  Vitals and nursing note reviewed.   Constitutional:       General: She is not in acute distress.     Appearance: Normal appearance. She is obese. She is not ill-appearing.   Cardiovascular:      Rate and Rhythm: Normal rate and regular rhythm.      Heart sounds: No murmur heard.     No friction rub. No gallop.   Pulmonary:      Effort: Pulmonary effort is normal. No respiratory distress.      Breath sounds: No wheezing, rhonchi or rales.   Musculoskeletal:      Right lower le+ Pitting Edema present.      Left lower le+ Pitting Edema present.   Feet:      Right foot:      Protective Sensation: 10 sites tested.  3 sites sensed.      Skin integrity: Dry skin present. No ulcer, blister, skin breakdown, erythema, warmth, callus or fissure.      Toenail Condition: Right toenails are long. Fungal disease present.     Left foot:      Protective Sensation: 10 sites tested.  3 sites sensed.      Skin integrity: Dry skin present. No ulcer, blister, skin breakdown, erythema, warmth, callus or fissure.      Toenail Condition: Left toenails are long. Fungal disease present.  Skin:     General: Skin is warm  and dry.   Neurological:      Mental Status: She is alert.   Psychiatric:         Mood and Affect: Mood normal.         Behavior: Behavior normal. Behavior is cooperative.         Thought Content: Thought content normal.         Judgment: Judgment normal.        Signed Electronically by: BATSHEVA Rodrigues CNP    Answers submitted by the patient for this visit:  General Questionnaire (Submitted on 7/8/2025)  Chief Complaint: Chronic problems general questions HPI Form  What is the reason for your visit today? : follow up check skin check under right breast, feet  How many minutes a day do you exercise enough to make your heart beat faster?: 9 or less  How many days a week do you exercise enough to make your heart beat faster?: 3 or less  How many days per week do you miss taking your medication?: 0  Questionnaire about: Chronic problems general questions HPI Form (Submitted on 7/8/2025)  Chief Complaint: Chronic problems general questions HPI Form

## 2025-07-14 NOTE — Clinical Note
Good afternoon Dr. Waller,  I saw Savanna this afternoon. Just wanted to FYI message you that she has had a 5 lb weight gain over the past week. Currently on lasix 40 mg daily. She is asymptomatic outside of 1+ ankle and foot swelling. Is there anything you would like to have done in the meantime before your visit w/ her 7/29/2025? Thanks for your time!  BATSHEVA Rodrigues CNP

## 2025-07-14 NOTE — TELEPHONE ENCOUNTER
Home Care is calling regarding an established patient with [facility].  Requesting orders from: Roya Garza RN APPROVED: RN able to provide verbal orders.  Home Care will send orders for signature.  RN will close encounter.  Is this a request for a temporary pause in the home care episode?  No    Orders Requested    Physical Therapy  Request for delay in care, service to be provided within 7 days of previously   Service rescheduled to 7/15/2025 due to patient preference.   RN gave verbal order: Yes      Phone number Home Care can be reached at: [phone]  Okay to leave a detailed message?: Yes    Contacts       Contact Date/Time Type Contact Phone/Fax    07/14/2025 03:49 PM CDT Phone (Incoming) Randell PT (Home Care) [phone]     PT with [facility].          Milli Patel RN

## 2025-07-15 ENCOUNTER — TELEPHONE (OUTPATIENT)
Dept: FAMILY MEDICINE | Facility: CLINIC | Age: 85
End: 2025-07-15
Payer: COMMERCIAL

## 2025-07-15 ENCOUNTER — PATIENT OUTREACH (OUTPATIENT)
Dept: CARE COORDINATION | Facility: CLINIC | Age: 85
End: 2025-07-15
Payer: COMMERCIAL

## 2025-07-15 NOTE — TELEPHONE ENCOUNTER
Home Care is calling regarding an established patient with [facility].  Requesting orders from: Roya Garza RN APPROVED: RN able to provide verbal orders.  Home Care will send orders for signature.  RN will close encounter.  Is this a request for a temporary pause in the home care episode?  No    Orders Requested    Physical Therapy  Request for delay in care, service to be provided within 7 days of previously   Service rescheduled to July 18th from today, 7/15.    RN gave verbal order: Yes      Phone number Home Care can be reached at:   Okay to leave a detailed message?: Yes    Contacts       Contact Date/Time Type Contact Phone/Fax    07/15/2025 12:18 PM CDT Phone (Incoming) Robinson  with [facility] [phone]     confidential line          Yadira Ventura RN

## 2025-07-16 ENCOUNTER — PATIENT OUTREACH (OUTPATIENT)
Dept: CARE COORDINATION | Facility: CLINIC | Age: 85
End: 2025-07-16
Payer: COMMERCIAL

## 2025-07-16 NOTE — PROGRESS NOTES
Clinic Care Coordination Contact  Community Health Worker Follow Up    Care Gaps:     Health Maintenance Due   Topic Date Due    HF ACTION PLAN  Never done    DEPRESSION ACTION PLAN  Never done    HEPATITIS A VACCINE (1 of 2 - Risk 2-dose series) Never done    ZOSTER VACCINE (1 of 2) Never done    HEPATITIS B VACCINE (1 of 3 - Risk 3-dose series) Never done    RSV VACCINE (1 - 1-dose 75+ series) Never done    MEDICARE ANNUAL WELLNESS VISIT  11/30/2021       Care Gap Goal set: Yes    Care Plan:   Care Plan: Utilization       Problem: Increased risk of re-admission       Long-Range Goal: I would like additional resources and support to manage my health and prevent future avoidable ED visits/hospital admissions       Start Date: 4/29/2025 Expected End Date: 2/27/2026    This Visit's Progress: 30% Recent Progress: 20%    Note:     Barriers: diagnosis of multiple, chronic, complex medical conditions, provider availability - wait time to complete appointments, etc.   Strengths: motivated, engaged in care coordination, participates in home care therapies   Patient expressed understanding of goal: Yes   Action steps to achieve this goal:  1. I will follow up with my providers as scheduled/recommended:   Primary Care Provider: 09/22/25  Cardiology: #[phone] 7/29/25  Neurology: 07/29/2025 - will be rescheduling with Dr. Ragsdale.   Oncology: 07/17/2025  Infectious Disease Dr. Granados - 07/29/25   Sleep clinic TBD #[phone]   2. I will discuss, review, schedule & complete recommended overdue health maintenance with my Primary Care Provider.   3. I will take my medications as prescribed.   4. I will contact my care team with questions, concerns, support needs. I will use the clinic as a resource and I understand I can contact my clinic with 24/7 after hours services available. Care Coordinator will remain available as needed.                                 Intervention and Education during outreach: Patient updates  CHW on recent concerns, she mentions that she wants to get out of the house for therapy despite her limited mobility. She shares that she is on an antidepressant and feels getting out of the house helps.   Patient states that [facility] helps some too, they have put $200 in Go Card so she can use [employer].   Pt is aware that she opted into outpatient therapy, then said PCP changed it back to home care, and now she has no say. She reports not having any therapy since June and feels she is deteriorating.   Pt states that she has a lift chair, two walkers and electric scooter she uses at home. PT came on Monday when she got home from the clinic however they hadn't called prior to set up a time. She then scheduled appt with him for Friday which now she realized won't work as she is doing another class and won't be back in time. Planning to call [facility].   Patient is aware that she needs to schedule with neurology and also shares that she can't get in with her urologist because she moved to [place] and [employer] won't bring her there.   Pt asks about sleep referral placed yesterday, CHW given scheduling #[phone].  She appreciates outreach and accepts ongoing monthly outreaches from CHW.     CHW Plan: Next outreach in one month.     Adrianna WILLIAM, B.S. [facility]  Clinic Care Coordination  [facility] Clinics:  [place], [place] and [place]  [phone]  [email or web address]

## 2025-07-17 ENCOUNTER — LAB (OUTPATIENT)
Dept: ONCOLOGY | Facility: CLINIC | Age: 85
End: 2025-07-17
Attending: INTERNAL MEDICINE
Payer: COMMERCIAL

## 2025-07-17 DIAGNOSIS — D69.3 IDIOPATHIC THROMBOCYTOPENIC PURPURA (H): ICD-10-CM

## 2025-07-17 DIAGNOSIS — I48.20 CHRONIC ATRIAL FIBRILLATION (H): ICD-10-CM

## 2025-07-17 DIAGNOSIS — D69.6 THROMBOCYTOPENIA: ICD-10-CM

## 2025-07-17 LAB
ALBUMIN SERPL BCG-MCNC: 4 G/DL (ref 3.5–5.2)
ALP SERPL-CCNC: 126 U/L (ref 40–150)
ALT SERPL W P-5'-P-CCNC: 23 U/L (ref 0–50)
ANION GAP SERPL CALCULATED.3IONS-SCNC: 9 MMOL/L (ref 7–15)
AST SERPL W P-5'-P-CCNC: 39 U/L (ref 0–45)
BASOPHILS # BLD AUTO: 0 10E3/UL (ref 0–0.2)
BASOPHILS NFR BLD AUTO: 1 %
BILIRUB SERPL-MCNC: 1.1 MG/DL
BUN SERPL-MCNC: 22.9 MG/DL (ref 8–23)
CALCIUM SERPL-MCNC: 9.4 MG/DL (ref 8.8–10.4)
CHLORIDE SERPL-SCNC: 102 MMOL/L (ref 98–107)
CREAT SERPL-MCNC: 1.02 MG/DL (ref 0.51–0.95)
EGFRCR SERPLBLD CKD-EPI 2021: 55 ML/MIN/1.73M2
EOSINOPHIL # BLD AUTO: 0.2 10E3/UL (ref 0–0.7)
EOSINOPHIL NFR BLD AUTO: 3 %
ERYTHROCYTE [DISTWIDTH] IN BLOOD BY AUTOMATED COUNT: 14.2 % (ref 10–15)
GLUCOSE SERPL-MCNC: 132 MG/DL (ref 70–99)
HCO3 SERPL-SCNC: 28 MMOL/L (ref 22–29)
HCT VFR BLD AUTO: 36.4 % (ref 35–47)
HGB BLD-MCNC: 12.3 G/DL (ref 11.7–15.7)
IMM GRANULOCYTES # BLD: 0 10E3/UL
IMM GRANULOCYTES NFR BLD: 0 %
LYMPHOCYTES # BLD AUTO: 1.5 10E3/UL (ref 0.8–5.3)
LYMPHOCYTES NFR BLD AUTO: 30 %
MCH RBC QN AUTO: 31.1 PG (ref 26.5–33)
MCHC RBC AUTO-ENTMCNC: 33.8 G/DL (ref 31.5–36.5)
MCV RBC AUTO: 92 FL (ref 78–100)
MONOCYTES # BLD AUTO: 0.7 10E3/UL (ref 0–1.3)
MONOCYTES NFR BLD AUTO: 13 %
NEUTROPHILS # BLD AUTO: 2.6 10E3/UL (ref 1.6–8.3)
NEUTROPHILS NFR BLD AUTO: 52 %
NRBC # BLD AUTO: 0 10E3/UL
NRBC BLD AUTO-RTO: 0 /100
PLATELET # BLD AUTO: 135 10E3/UL (ref 150–450)
POTASSIUM SERPL-SCNC: 4.8 MMOL/L (ref 3.4–5.3)
PROT SERPL-MCNC: 7.6 G/DL (ref 6.4–8.3)
RBC # BLD AUTO: 3.96 10E6/UL (ref 3.8–5.2)
SODIUM SERPL-SCNC: 139 MMOL/L (ref 135–145)
WBC # BLD AUTO: 5 10E3/UL (ref 4–11)

## 2025-07-17 PROCEDURE — 36415 COLL VENOUS BLD VENIPUNCTURE: CPT

## 2025-07-17 PROCEDURE — 85025 COMPLETE CBC W/AUTO DIFF WBC: CPT | Performed by: INTERNAL MEDICINE

## 2025-07-17 PROCEDURE — 82310 ASSAY OF CALCIUM: CPT | Performed by: INTERNAL MEDICINE

## 2025-07-17 NOTE — PROGRESS NOTES
Medical Assistant Note:  Savanna Rehman presents today for blood draw.    Patient seen by provider today: No.   present during visit today: Not Applicable.    Concerns: No Concerns.    Procedure:  Lab draw site: right antecub, Needle type: butterfly, Gauge: 23.    Post Assessment:  Labs drawn without difficulty: Yes.    Discharge Plan:  Departure Mode: Ambulatory.    Face to Face Time: 10 minutes.    Patient unable to stay for visit today due to diarrhea.     Kusum Tracey MA

## 2025-07-18 ENCOUNTER — MEDICAL CORRESPONDENCE (OUTPATIENT)
Dept: HEALTH INFORMATION MANAGEMENT | Facility: CLINIC | Age: 85
End: 2025-07-18
Payer: COMMERCIAL

## 2025-07-19 ENCOUNTER — HEALTH MAINTENANCE LETTER (OUTPATIENT)
Age: 85
End: 2025-07-19

## 2025-07-21 ENCOUNTER — TELEPHONE (OUTPATIENT)
Dept: FAMILY MEDICINE | Facility: CLINIC | Age: 85
End: 2025-07-21
Payer: COMMERCIAL

## 2025-07-21 NOTE — TELEPHONE ENCOUNTER
Please notify appropriate people.    Yes, Okay to delay services to 7-24-25.      Thank you,   BATSHEVA Joel CNP

## 2025-07-21 NOTE — TELEPHONE ENCOUNTER
Home Care is calling regarding an established patient with [facility].  Requesting orders from: Roya Garza  PROVIDER AUTHORIZATION REQUIRED: RN unable to provide verbal approval for all orders.  See below for additional information.  RN will contact Home care with information after provider review.  Is this a request for a temporary pause in the home care episode?  No    Orders Requested    Physical Therapy  Request for second delay in start of care, per patient request.   Service rescheduled to 7/24/25    RN gave verbal order: No: need provider approval to delay start of care.      Phone number Home Care can be reached at: [phone]  Okay to leave a detailed message?: Yes      Dottie Oconnor RN

## 2025-07-22 ENCOUNTER — TELEPHONE (OUTPATIENT)
Dept: FAMILY MEDICINE | Facility: CLINIC | Age: 85
End: 2025-07-22

## 2025-07-22 ENCOUNTER — OFFICE VISIT (OUTPATIENT)
Dept: FAMILY MEDICINE | Facility: CLINIC | Age: 85
End: 2025-07-22
Payer: COMMERCIAL

## 2025-07-22 ENCOUNTER — PATIENT OUTREACH (OUTPATIENT)
Dept: CARE COORDINATION | Facility: CLINIC | Age: 85
End: 2025-07-22

## 2025-07-22 VITALS
DIASTOLIC BLOOD PRESSURE: 80 MMHG | SYSTOLIC BLOOD PRESSURE: 127 MMHG | OXYGEN SATURATION: 92 % | WEIGHT: 236.7 LBS | BODY MASS INDEX: 37.15 KG/M2 | HEART RATE: 88 BPM | RESPIRATION RATE: 18 BRPM | HEIGHT: 67 IN | TEMPERATURE: 97.7 F

## 2025-07-22 DIAGNOSIS — R19.5 LOOSE STOOLS: Primary | ICD-10-CM

## 2025-07-22 DIAGNOSIS — F43.23 ADJUSTMENT DISORDER WITH MIXED ANXIETY AND DEPRESSED MOOD: ICD-10-CM

## 2025-07-22 DIAGNOSIS — R53.81 PHYSICAL DECONDITIONING: ICD-10-CM

## 2025-07-22 DIAGNOSIS — F33.1 MODERATE EPISODE OF RECURRENT MAJOR DEPRESSIVE DISORDER (H): ICD-10-CM

## 2025-07-22 ASSESSMENT — ENCOUNTER SYMPTOMS: DIARRHEA: 1

## 2025-07-22 ASSESSMENT — PAIN SCALES - GENERAL: PAINLEVEL_OUTOF10: MODERATE PAIN (4)

## 2025-07-22 NOTE — PROGRESS NOTES
Assessment & Plan     (R19.5) Loose stools  (primary encounter diagnosis)  Comment: Chronic.  Persistent.  Offered stool cultures / c.diff testing today.  Declines.  Would like to wait for Dr. Dia to respond.    (R53.81) Physical deconditioning  Comment: Chronic.  Persisten.  Will start PT at home soon.  Plan: Adult Mental Health  Referral    (F33.1) Moderate episode of recurrent major depressive disorder (H)  Comment: Chronic.  Persistent.  Takes Celexa daily.  She is unsure if its working.  Will continue to assess.  Plan: Adult Mental Health  Referral    (F43.23) Adjustment disorder with mixed anxiety and depressed mood  Comment: Chronic.  Takes Celexa daily.  See comments above.  Plan: Adult Mental Health  Referral       Patient Instructions   Take your meds as prescribed.    Scripts / refills sent to your pharmacy.     Brat Diet.   Can add plain pasta.    Lean protein.  Chicken or fish.    Fiber rich diet and Imodium per past GI note in March 2025.      Increase water (48-64 ounces per day), fruits, and vegetables.    Exercise at least 5 days per week for 30 minutes each day as tolerated.      Follow-up if any changes or worsening symptoms.      Call or MyChart as needed as well.        The longitudinal plan of care for the diagnosis(es)/condition(s) as documented were addressed during this visit. Due to the added complexity in care, I will continue to support Savanna in the subsequent management and with ongoing continuity of care.        Mamadou Corrales is a 84 year old, presenting for the following health issues:  Diarrhea        7/22/2025     3:02 PM   Additional Questions   Roomed by Karina Arteaga, EMT-B     Diarrhea    History of Present Illness       Reason for visit:  Follow up check skin check under right breast, feetShe exercises with enough effort to increase her heart rate 9 or less minutes per day.  She exercises with enough effort to increase her heart rate 3 or  "less days per week.   She is taking medications regularly.        Diarrhea  Onset/Duration: 4 days; slowly improving.    Description:       Consistency of stool: loose, pasty, mucousy, and with pus       Blood in stool: No       Number of loose stools past 24 hours: 7  Progression of Symptoms: slight improvement  Accompanying signs and symptoms:       Fever: No       Nausea/Vomiting: YES- nausea       Abdominal pain: YES; lower abodmen; rates pain 4-5/10.       Weight loss: No, retaining fluid though       Episodes of constipation: No  History   Ill contacts: No  Recent use of antibiotics: No  Recent travels: No  Recent medication-new or changes(Rx or OTC): No  Precipitating or alleviating factors: None  Therapies tried and outcome: Imodium right ear; helps.     Has been dealing with loose stools for the past several years, but now has pain in the lower abdomen.  No urinary symptoms; frequency, urgency, or dysuria.     Had labs done on 7-17-25 and these labs were stable.  Follows Dr. Dia at [facility].  They were pleased with other labs.       Review of Systems  Constitutional, neuro, ENT, endocrine, pulmonary, cardiac, gastrointestinal, genitourinary, musculoskeletal, integument and psychiatric systems are negative, except as otherwise noted.        Objective    /80 (BP Location: Right arm, Patient Position: Sitting, Cuff Size: Adult Small)   Pulse 88   Temp 97.7  F (36.5  C) (Oral)   Resp 18   Ht 1.689 m (5' 6.5\")   Wt 107.4 kg (236 lb 11.2 oz)   LMP  (LMP Unknown)   SpO2 92%   BMI 37.63 kg/m    Body mass index is 37.63 kg/m .  Physical Exam  Constitutional:       General: She is not in acute distress.     Appearance: Normal appearance. She is not ill-appearing.   HENT:      Head: Normocephalic.   Cardiovascular:      Rate and Rhythm: Normal rate and regular rhythm.      Heart sounds: Normal heart sounds. No murmur heard.     No friction rub. No gallop.   Pulmonary:      Effort: Pulmonary effort is " normal. No respiratory distress.      Breath sounds: Normal breath sounds. No wheezing, rhonchi or rales.   Abdominal:      General: Abdomen is flat. Bowel sounds are normal. There is no distension.      Palpations: Abdomen is soft. There is no mass.      Tenderness: There is no abdominal tenderness. There is no right CVA tenderness, left CVA tenderness, guarding or rebound.      Hernia: No hernia is present.      Comments: No pain upon palpation of abdomen.     Musculoskeletal:      Cervical back: Normal range of motion and neck supple. No rigidity.   Neurological:      General: No focal deficit present.      Mental Status: She is alert.   Psychiatric:         Mood and Affect: Mood normal.         Behavior: Behavior normal.         Thought Content: Thought content normal.         Judgment: Judgment normal.                Signed Electronically by: BATSHEVA Joel CNP

## 2025-07-22 NOTE — PROGRESS NOTES
Clinic Care Coordination Contact  Care Coordination Clinician Chart review    Situation: Patient chart reviewed by care coordinator.       Background: Care Coordination initial assessment and enrollment took place 4/29/2025.   Upon initial assessment patient-centered goals were discussed and developed with participation from patient.  RNCC handed patient follow up and monitoring of goal progression off to [facility] for continued outreach every 30 days.        Assessment: Per chart review, patient outreach completed by CC CHW on 07/16/2025.  Patient is actively working to accomplish goal(s) and patient's goal(s) remain(s) appropriate and relevant at this time.       Care Plan: Utilization       Problem: Increased risk of re-admission       Long-Range Goal: I would like additional resources and support to manage my health and prevent future avoidable ED visits/hospital admissions       Start Date: 4/29/2025 Expected End Date: 2/27/2026    Recent Progress: 30%    Note:     Barriers: diagnosis of multiple, chronic, complex medical conditions, provider availability - wait time to complete appointments, etc.   Strengths: motivated, engaged in care coordination, participates in home care therapies   Patient expressed understanding of goal: Yes   Action steps to achieve this goal:  1. I will follow up with my providers as scheduled/recommended:   Primary Care Provider: 09/22/25  Cardiology: #[phone] 7/29/25  Neurology: 07/29/2025 - will be rescheduling with Dr. Ragsdale.   Oncology: 07/17/2025  Infectious Disease Dr. Granados - 07/29/25   Sleep clinic #[phone] 11/05/25  2. I will discuss, review, schedule & complete recommended overdue health maintenance with my Primary Care Provider.   3. I will take my medications as prescribed.   4. I will contact my care team with questions, concerns, support needs. I will use the clinic as a resource and I understand I can contact my clinic with 24/7 after hours services  available. Care Coordinator will remain available as needed.                                 Plan/Recommendations: RNCC Clinical Assessments to be completed annually or as needed. Annual Assessment will be due 04/2026. The patient will continue working with Care Coordination to achieve goal(s) as above.  CHWCC will involve RNCC as needed or if patient is ready to transition to goal status of Maintenance.  RNCC will continue to review progress to goal(s) and CHWCC outreaches every 6 weeks.     Complex Care Plan:  Patient is due for updated Plan of Care.  Care Plan updated and sent to patient: Yes, via Adamaris Lin RN Care Coordinator  [facility]  Email: [email or web address]  Phone: [phone]

## 2025-07-22 NOTE — TELEPHONE ENCOUNTER
Called Aislinn PT with [facility], pt gets her own groceries, gets lunch with her friends.  She reschedules all the time for home care RN and home PT-keeps pushing out start date.  PT thinks she may not be appropriate for home PT.  She says she hasn't put eyes on her but she was discharged prior.  This home care PT [facility] can't work with [facility] and the patient does not want to give up her current home care RN and she would need to to have home PT with [facility].  They keep rescheduling her start date, she doesn't want to be home bound and is adamant she can go out and do outpatient PT.      She does not have a home healthcare nurse, she has a home care nurse which is not skilled.  Paid through by the [facility].      Yadira Ventura RN BSN  Clinic Nurse  [facility]

## 2025-07-22 NOTE — PATIENT INSTRUCTIONS
Take your meds as prescribed.    Scripts / refills sent to your pharmacy.     Brat Diet.   Can add plain pasta.    Lean protein.  Chicken or fish.    Fiber rich diet and Imodium per past GI note in March 2025.      Increase water (48-64 ounces per day), fruits, and vegetables.    Exercise at least 5 days per week for 30 minutes each day as tolerated.      Follow-up if any changes or worsening symptoms.      Call or MyChart as needed as well.

## 2025-07-22 NOTE — TELEPHONE ENCOUNTER
Savanna has a home healthcare nurse.  She's had homecare nurse for about 10 years.  I signed orders about 2 weeks ago.    She needs to have PT in the home.        BATSHEVA Joel CNP

## 2025-07-22 NOTE — LETTER
[facility] COORDINATION  [address]  [place] [address]     July 22, 2025        Savanna Sosakarie  [address]  [facility] [address]          Dear Anna Corrales is an updated Patient Centered Plan of Care for your continued enrollment in Care Coordination. Please let us know if you have additional questions, concerns, or goals that we can assist with.    Sincerely,    Crystal Lin, RN Care Coordinator  [facility]  Email: [email or web address]  Phone: [phone]              [facility]  Patient Centered Plan of Care  About Me:      Patient Name:  Savanna Rehman    YOB: 1940  Age:         84 year old   [place] MRN:    [id number] Telephone Information:  Home Phone [phone]   Mobile [phone]       Address:  [address]  [facility] [address] Email address:  [email or web address]      Emergency Contact(s)    Name Relationship Lgl Grd Work Phone Home Phone Mobile Phone   Esa JAIN Daughter No   [phone]           Primary language:  English     needed? No   [place] [employer]:  [phone] op. 1  Other communication barriers:Glasses; Utilization  Preferred Method of Communication:  Phone  Current living arrangement: I live alone  Mobility Status/ Medical Equipment: Independent w/Device    Health Maintenance  Health Maintenance Reviewed: Due/Overdue   Health Maintenance Due   Topic Date Due    HF ACTION PLAN  Never done    DEPRESSION ACTION PLAN  Never done    HEPATITIS A VACCINE (1 of 2 - Risk 2-dose series) Never done    ZOSTER VACCINE (1 of 2) Never done    HEPATITIS B VACCINE (1 of 3 - Risk 3-dose series) Never done    RSV VACCINE (1 - 1-dose 75+ series) Never done    MEDICARE ANNUAL WELLNESS VISIT  11/30/2021      My Access Plan  Medical Emergency 911   Primary Clinic Line [facility] - [phone]    24 Hour Appointment Line [phone] or  [phone]) (toll-free)   24 Hour Nurse Line [phone] (toll-free)   Preferred Urgent Care Other ([facility])     Preferred Hospital [facility]  [phone]     Preferred Pharmacy [facility] - [place], [place] - [address] [facility]     Behavioral Health Crisis Line The National Suicide Prevention Lifeline at 1-536.806.9137 or Text/Call 988     My Care Team Members  Patient Care Team         Relationship Specialty Notifications Start End    Roya Garza APRN CNP PCP - General Family Medicine  4/29/25     Phone: [phone] Fax: [phone] [address] [facility] [address]    Patti Suárez MD Referring Physician Internal Medicine  6/30/20     Phone: [phone] Pager: Use Vocera Fax: [phone]        [address] [facility] [address]    Deisy Wilson MD MD Urology  6/30/20     Phone: [phone] Fax: [phone]         [address] [address]    Adelaida Liu, RN Specialty Care Coordinator Urology  6/30/20     Phone: [phone] Pager: [phone]        Allison Mack MD MD Internal Medicine  2/20/23     Phone: [phone] Fax: [phone]         [address] [place] [address]    Sowmya Pollard APRN CNP Nurse Practitioner Dermatology  1/29/24     Phone: [phone] Fax: [phone]         [address] [facility] [address]    Carlos Youngblood MD  Internal Medicine  3/7/24     Phone: [phone] Fax: [phone] [address] [facility] [address]    Aniyah Waller DO Physician Cardiovascular Disease  3/13/24     Phone: [phone] Fax: [phone] [address] [facility] [address] [facility] [address]    Carlos Youngblood MD  Internal Medicine  4/4/24     Phone: [phone] Fax: [phone] [address] [facility]  CANCER CENTER [facility] [address]    Taylor Payan MD Assigned PCP   4/23/24     Phone: [phone] Fax: [phone]         [address] [facility] [address]    Aniyah Waller DO Assigned Heart and Vascular Provider   6/23/24     Phone: [phone] Fax: [phone] [address] [facility] [address]    Aniyah Waller DO Physician Cardiovascular Disease  7/9/24     Phone: [phone] Fax: [phone] [address] [place] [address]    Pasty Pompa MD Assigned Cancer Care Provider   9/23/24     Phone: [phone] Pager: YAHIR VOCAGYLE Fax: [phone] [address] [facility], [address] [address]    Sheree Herrera RN Specialty Care Coordinator Hematology & Oncology Admissions 12/2/24     Mariam Lowery PA-C Physician Assistant   2/3/25     Phone: [phone] Fax: [phone]         [address] [place] [address]    Olu Wilson MD MD Neurology  2/28/25     Phone: [phone] Fax: [phone]         [address] [facility] [address]    Sowmya Pollard, APRN CNP Assigned Dermatology Provider   3/23/25     Phone: [phone] Fax: [phone]         [facility] [address]    Crystal Lin, RN Lead Care Coordinator  Admissions 4/29/25     Phone: [phone]         Adrianna Whiting, W Community Health Worker Primary Care - CC Admissions 4/29/25     Phone: [phone]         Yrn Contreras LMFT Assigned Behavioral Health Provider   5/23/25     Phone: [phone] [address] [facility] [address]    Juancarlos Simeon MD MD Neurology  7/9/25     Phone: [phone] Fax: [phone] [address] [facility] [address]              My Care Plans  Self Management and Treatment Plan    Care Plan  Care Plan: Utilization       Problem: Increased risk of re-admission       Long-Range Goal: I would like additional resources and support to manage my  health and prevent future avoidable ED visits/hospital admissions       Start Date: 4/29/2025 Expected End Date: 2/27/2026    Recent Progress: 30%    Note:     Barriers: diagnosis of multiple, chronic, complex medical conditions, provider availability - wait time to complete appointments, etc.   Strengths: motivated, engaged in care coordination, participates in home care therapies   Patient expressed understanding of goal: Yes   Action steps to achieve this goal:  1. I will follow up with my providers as scheduled/recommended:   Primary Care Provider: 09/22/25  Cardiology: #[phone] 7/29/25  Neurology: 07/29/2025 - will be rescheduling with Dr. Ragsdale.   Oncology: 07/17/2025  Infectious Disease Dr. Granados - 07/29/25   Sleep clinic #[phone] 11/05/25  2. I will discuss, review, schedule & complete recommended overdue health maintenance with my Primary Care Provider.   3. I will take my medications as prescribed.   4. I will contact my care team with questions, concerns, support needs. I will use the clinic as a resource and I understand I can contact my clinic with 24/7 after hours services available. Care Coordinator will remain available as needed.                               Action Plans on File:                       Advance Care Plans/Directives:   Advanced Care Plan/Directives on file: Yes    Status of Document(s): On File and Validated    Advanced Care Plan/Directives Type: POLST           My Medical and Care Information  Problem List   Patient Active Problem List   Diagnosis    Angioedema    CRISTIANA (obstructive sleep apnea)     Recurrent UTI    Urgency-frequency syndrome    Chronic atrial fibrillation (H)    Permanent atrial fibrillation (H)    Hyperlipidemia LDL goal <100    Vitamin D deficiency    Type 2 diabetes mellitus with stage 3a chronic kidney disease, without long-term current use of insulin (H)    Ascending aorta dilatation    Nonrheumatic tricuspid valve regurgitation     Gastroesophageal reflux disease    Stage 3b chronic kidney disease (H)    Adjustment disorder with mixed anxiety and depressed mood    Abnormal feces    Diverticular disease of colon    Loose stools    Lower abdominal pain    Elevated troponin    Repeated falls    Dizziness and giddiness    Physical deconditioning    Obstructive sleep apnea (adult) (pediatric)    History of Clostridium difficile colitis    Muscle weakness (generalized)    Hypertensive heart disease with chronic diastolic congestive heart failure (H)    Abnormal liver enzymes    Cirrhosis of liver (H)    Acute encephalopathy    Congestive heart failure, unspecified HF chronicity, unspecified heart failure type (H)    Atrial fibrillation with slow ventricular response (H)    S/P placement of cardiac pacemaker: 4/5/2024    Cognitive impairment    Urinary incontinence, unspecified type    Thrombocytopenia    Moderate episode of recurrent major depressive disorder (H)    Ischemic optic neuropathy of right eye    Acute intractable headache, unspecified headache type      Current Medications:  Please refer to the most recent medication list provided to you by your medical team and reach out to your provider with any questions or to make any corrections.    Care Coordination Start Date: 4/29/2025   Frequency of Care Coordination: monthly, more frequently as needed     Form Last Updated: 07/22/2025

## 2025-07-22 NOTE — TELEPHONE ENCOUNTER
Aislinn, PT [facility] [phone]    Savanna has had to put off start of care a few times due to diarrhea.  Called [facility] this am and said states she found out if she has PT with [facility] she can not have her home care nurse.  Aislinn trying to see if Savanna has another home care.  Does appear she may have [facility].  Savanna was asking Aislinn about outpatient PT.  Do you feel she is able to go to outpatient PT?  Please advise.  Meghann Roach RN

## 2025-07-28 ENCOUNTER — TELEPHONE (OUTPATIENT)
Dept: FAMILY MEDICINE | Facility: CLINIC | Age: 85
End: 2025-07-28
Payer: COMMERCIAL

## 2025-07-28 NOTE — TELEPHONE ENCOUNTER
Roya Garza, BATSHEVA CNP    Pt wondering what she should do about her PT as she was discharged from Home care PT.  She has not had therapy since 6/6/25 and she is concerned about this as it has been a long time.  Please respond back with a plan, pt very concerned about this, has called a few times.      Also she has gone from diarrhea to now constipation.  She will bring in a sample as soon as she is able.     Heart clinic called and changed her appointment, she had everything fixed up for tomorrow but they called and rescheduled her.  But they will send a script for medication.     Yadira Ventura RN BSN  Clinic Nurse  [facility]

## 2025-07-29 ENCOUNTER — PRE VISIT (OUTPATIENT)
Dept: NEUROLOGY | Facility: CLINIC | Age: 85
End: 2025-07-29

## 2025-07-29 DIAGNOSIS — I48.91 ATRIAL FIBRILLATION (H): ICD-10-CM

## 2025-07-29 RX ORDER — METOPROLOL SUCCINATE 25 MG/1
25 TABLET, EXTENDED RELEASE ORAL DAILY
Qty: 90 TABLET | Refills: 0 | Status: SHIPPED | OUTPATIENT
Start: 2025-07-29

## 2025-07-29 NOTE — PROGRESS NOTES
[facility] Cardiology Refill Guideline reviewed.  Medication meets criteria for refill.  Future OV 9/15/25 with Dr. Waller.  Veronica Dominguez, RN on 7/29/2025 at 12:21 PM

## 2025-07-30 NOTE — TELEPHONE ENCOUNTER
"RN attempted to discuss message below from Mohan HERMAN CNP      Educated that we are not able to change home care policies/rules if they advised PT is not needed or covered we can not change this outcome     Educated that if going to outside PT, can lose home care all together and pcp would like patient to keep home care     RN advised visit is needed if wishing to discuss further as we have had a few conversations regarding this     Patient replied   \"Now I am getting mad, and you have never seen me mad\"  \"I am not talking about this any more, I am currently at the grocery store, goodbye\"     Patient ended call     Lucy Castro Registered Nurse  [facility]       "

## 2025-07-30 NOTE — TELEPHONE ENCOUNTER
It looks like Roya has had a discussion regarding this in other notes previously. If they graduated her from Home PT they more than likely feel she is better and safe to perform exercises at home on her own without PT following. She should also know that if she got to PT outside of her home she may lose her home care. If she continues to have questions, she needs to schedule a visit to discuss further.     BATSHEVA Rodrigues CNP

## 2025-07-30 NOTE — TELEPHONE ENCOUNTER
BATSHEVA Rodrigues CNP     Hasn't had PT since June 6th, pt calling again.  She wants to go out for Physical therapy.  Please see message below.  She says everything she has done before with therapy is gone.  She is very concerned about this. She is asking the message gets sent to you Mohan.       Yadira Ventura RN BSN  Clinic Nurse  [facility]

## 2025-08-15 ENCOUNTER — PATIENT OUTREACH (OUTPATIENT)
Dept: CARE COORDINATION | Facility: CLINIC | Age: 85
End: 2025-08-15
Payer: COMMERCIAL

## 2025-08-18 ENCOUNTER — PATIENT OUTREACH (OUTPATIENT)
Dept: CARE COORDINATION | Facility: CLINIC | Age: 85
End: 2025-08-18
Payer: COMMERCIAL

## 2025-08-18 ENCOUNTER — TELEPHONE (OUTPATIENT)
Dept: CARE COORDINATION | Facility: CLINIC | Age: 85
End: 2025-08-18
Payer: COMMERCIAL
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
## 2025-08-20 PROBLEM — M47.812 CERVICAL SPONDYLOSIS WITHOUT MYELOPATHY: Status: ACTIVE | Noted: 2025-05-22

## 2025-08-20 PROBLEM — K76.9: Status: ACTIVE | Noted: 2025-05-22

## 2025-08-20 PROBLEM — N39.41 URGE INCONTINENCE OF URINE: Status: ACTIVE | Noted: 2019-08-13

## 2025-08-20 PROBLEM — G43.919 REFRACTORY MIGRAINE: Status: ACTIVE | Noted: 2025-05-22

## 2025-08-20 PROBLEM — R53.1 ASTHENIA: Status: ACTIVE | Noted: 2025-05-22

## 2025-08-20 PROBLEM — N30.01 HEMATURIA DUE TO ACUTE CYSTITIS: Status: ACTIVE | Noted: 2025-05-22

## 2025-08-22 ENCOUNTER — MEDICAL CORRESPONDENCE (OUTPATIENT)
Dept: HEALTH INFORMATION MANAGEMENT | Facility: CLINIC | Age: 85
End: 2025-08-22
Payer: COMMERCIAL

## 2025-08-22 DIAGNOSIS — Z53.9 DIAGNOSIS NOT YET DEFINED: Primary | ICD-10-CM

## 2025-08-22 PROCEDURE — G0179 MD RECERTIFICATION HHA PT: HCPCS | Mod: 4MD | Performed by: NURSE PRACTITIONER

## 2025-08-25 ENCOUNTER — TELEPHONE (OUTPATIENT)
Dept: FAMILY MEDICINE | Facility: CLINIC | Age: 85
End: 2025-08-25
Payer: COMMERCIAL

## 2025-08-29 ENCOUNTER — ANCILLARY PROCEDURE (OUTPATIENT)
Dept: CARDIOLOGY | Facility: CLINIC | Age: 85
End: 2025-08-29
Attending: INTERNAL MEDICINE
Payer: COMMERCIAL

## 2025-08-29 DIAGNOSIS — I48.91 ATRIAL FIBRILLATION (H): ICD-10-CM

## 2025-08-29 DIAGNOSIS — Z95.0 CARDIAC PACEMAKER IN SITU: ICD-10-CM

## 2025-08-29 PROCEDURE — 93296 REM INTERROG EVL PM/IDS: CPT | Performed by: INTERNAL MEDICINE

## 2025-08-29 PROCEDURE — 93294 REM INTERROG EVL PM/LDLS PM: CPT | Performed by: INTERNAL MEDICINE

## 2025-09-03 LAB
MDC_IDC_EPISODE_DTM: NORMAL
MDC_IDC_EPISODE_DURATION: 1 S
MDC_IDC_EPISODE_DURATION: 2 S
MDC_IDC_EPISODE_ID: 100
MDC_IDC_EPISODE_ID: 101
MDC_IDC_EPISODE_ID: 102
MDC_IDC_EPISODE_ID: 99
MDC_IDC_EPISODE_TYPE: NORMAL
MDC_IDC_LEAD_CONNECTION_STATUS: NORMAL
MDC_IDC_LEAD_IMPLANT_DT: NORMAL
MDC_IDC_LEAD_LOCATION: NORMAL
MDC_IDC_LEAD_LOCATION_DETAIL_1: NORMAL
MDC_IDC_LEAD_MFG: NORMAL
MDC_IDC_LEAD_MODEL: NORMAL
MDC_IDC_LEAD_POLARITY_TYPE: NORMAL
MDC_IDC_LEAD_SERIAL: NORMAL
MDC_IDC_MSMT_BATTERY_DTM: NORMAL
MDC_IDC_MSMT_BATTERY_REMAINING_LONGEVITY: 154 MO
MDC_IDC_MSMT_BATTERY_RRT_TRIGGER: 2.62
MDC_IDC_MSMT_BATTERY_STATUS: NORMAL
MDC_IDC_MSMT_BATTERY_VOLTAGE: 3.06 V
MDC_IDC_MSMT_LEADCHNL_RV_IMPEDANCE_VALUE: 418 OHM
MDC_IDC_MSMT_LEADCHNL_RV_IMPEDANCE_VALUE: 570 OHM
MDC_IDC_MSMT_LEADCHNL_RV_PACING_THRESHOLD_AMPLITUDE: 0.88 V
MDC_IDC_MSMT_LEADCHNL_RV_PACING_THRESHOLD_PULSEWIDTH: 0.4 MS
MDC_IDC_MSMT_LEADCHNL_RV_SENSING_INTR_AMPL: 17.88 MV
MDC_IDC_MSMT_LEADCHNL_RV_SENSING_INTR_AMPL: 17.88 MV
MDC_IDC_PG_IMPLANT_DTM: NORMAL
MDC_IDC_PG_MFG: NORMAL
MDC_IDC_PG_MODEL: NORMAL
MDC_IDC_PG_SERIAL: NORMAL
MDC_IDC_PG_TYPE: NORMAL
MDC_IDC_SESS_CLINIC_NAME: NORMAL
MDC_IDC_SESS_DTM: NORMAL
MDC_IDC_SESS_TYPE: NORMAL
MDC_IDC_SET_BRADY_HYSTRATE: NORMAL
MDC_IDC_SET_BRADY_LOWRATE: 60 {BEATS}/MIN
MDC_IDC_SET_BRADY_MAX_SENSOR_RATE: 130 {BEATS}/MIN
MDC_IDC_SET_BRADY_MODE: NORMAL
MDC_IDC_SET_LEADCHNL_RV_PACING_AMPLITUDE: 2 V
MDC_IDC_SET_LEADCHNL_RV_PACING_ANODE_ELECTRODE_1: NORMAL
MDC_IDC_SET_LEADCHNL_RV_PACING_ANODE_LOCATION_1: NORMAL
MDC_IDC_SET_LEADCHNL_RV_PACING_CAPTURE_MODE: NORMAL
MDC_IDC_SET_LEADCHNL_RV_PACING_CATHODE_ELECTRODE_1: NORMAL
MDC_IDC_SET_LEADCHNL_RV_PACING_CATHODE_LOCATION_1: NORMAL
MDC_IDC_SET_LEADCHNL_RV_PACING_POLARITY: NORMAL
MDC_IDC_SET_LEADCHNL_RV_PACING_PULSEWIDTH: 0.4 MS
MDC_IDC_SET_LEADCHNL_RV_SENSING_ANODE_ELECTRODE_1: NORMAL
MDC_IDC_SET_LEADCHNL_RV_SENSING_ANODE_LOCATION_1: NORMAL
MDC_IDC_SET_LEADCHNL_RV_SENSING_CATHODE_ELECTRODE_1: NORMAL
MDC_IDC_SET_LEADCHNL_RV_SENSING_CATHODE_LOCATION_1: NORMAL
MDC_IDC_SET_LEADCHNL_RV_SENSING_POLARITY: NORMAL
MDC_IDC_SET_LEADCHNL_RV_SENSING_SENSITIVITY: 0.9 MV
MDC_IDC_SET_ZONE_DETECTION_INTERVAL: 400 MS
MDC_IDC_SET_ZONE_STATUS: NORMAL
MDC_IDC_SET_ZONE_TYPE: NORMAL
MDC_IDC_SET_ZONE_VENDOR_TYPE: NORMAL
MDC_IDC_STAT_BRADY_DTM_END: NORMAL
MDC_IDC_STAT_BRADY_DTM_START: NORMAL
MDC_IDC_STAT_BRADY_RV_PERCENT_PACED: 90.54 %
MDC_IDC_STAT_EPISODE_RECENT_COUNT: 0
MDC_IDC_STAT_EPISODE_RECENT_COUNT: 0
MDC_IDC_STAT_EPISODE_RECENT_COUNT: 4
MDC_IDC_STAT_EPISODE_RECENT_COUNT_DTM_END: NORMAL
MDC_IDC_STAT_EPISODE_RECENT_COUNT_DTM_START: NORMAL
MDC_IDC_STAT_EPISODE_TOTAL_COUNT: 0
MDC_IDC_STAT_EPISODE_TOTAL_COUNT: 0
MDC_IDC_STAT_EPISODE_TOTAL_COUNT: 102
MDC_IDC_STAT_EPISODE_TOTAL_COUNT_DTM_END: NORMAL
MDC_IDC_STAT_EPISODE_TOTAL_COUNT_DTM_START: NORMAL
MDC_IDC_STAT_EPISODE_TYPE: NORMAL

## (undated) DEVICE — KIT PROCEDURE W/CLEAN-A-SCOPE LINERS V2 200800

## (undated) DEVICE — SOL NACL 0.9% IRRIG 1000ML BOTTLE 2F7124

## (undated) DEVICE — Device

## (undated) DEVICE — DRSG TELFA 3X8" 1238

## (undated) DEVICE — SLITTER ADJSTBL 6232ADJ

## (undated) DEVICE — TUBING SUCTION MEDI-VAC SOFT 3/16"X20' N520A

## (undated) DEVICE — SOL WATER IRRIG 1000ML BOTTLE 2F7114

## (undated) DEVICE — PACK PCMKR PERM SRG PROC LF SAN32PC573

## (undated) DEVICE — POUCH TYRX ABSOB ANTIBACTERIAL MED

## (undated) DEVICE — ESU GROUND PAD UNIVERSAL W/O CORD

## (undated) DEVICE — BAG CLEAR TRASH 1.3M 39X33" P4040C

## (undated) DEVICE — GUIDEWIRE VASC 0.035INX150CM INQWIRE J TIP IQ35F150J3F/A

## (undated) DEVICE — LINEN TOWEL PACK X5 5464

## (undated) DEVICE — PAD CHUX UNDERPAD 30X36" P3036C

## (undated) DEVICE — SUCTION MANIFOLD DORNOCH ULTRA CART UL-CL500

## (undated) DEVICE — LINEN GOWN X4 5410

## (undated) DEVICE — GOWN XLG DISP 9545

## (undated) DEVICE — SHEATH PRELUDE SNAP 7FRX13CM PLS-1007

## (undated) DEVICE — GLOVE PROTEXIS W/NEU-THERA 6.5  2D73TE65

## (undated) DEVICE — DEFIB PRO-PADZ LVP LQD GEL ADULT 8900-2105-01

## (undated) DEVICE — SPECIMEN CONTAINER URINE 90ML STERILE 75.1435.002

## (undated) DEVICE — CATH GD 5.4FR ID / 7FR OD X 43

## (undated) DEVICE — RAD INTRODUCER KIT MICRO 5FRX10CM .018 NITINOL G/W

## (undated) DEVICE — SOL WATER IRRIG 3000ML BAG 2B7117

## (undated) RX ORDER — GLYCOPYRROLATE 0.2 MG/ML
INJECTION, SOLUTION INTRAMUSCULAR; INTRAVENOUS
Status: DISPENSED
Start: 2024-08-12

## (undated) RX ORDER — PROPOFOL 10 MG/ML
INJECTION, EMULSION INTRAVENOUS
Status: DISPENSED
Start: 2024-08-12

## (undated) RX ORDER — BUPIVACAINE HYDROCHLORIDE 2.5 MG/ML
INJECTION, SOLUTION EPIDURAL; INFILTRATION; INTRACAUDAL
Status: DISPENSED
Start: 2024-04-05

## (undated) RX ORDER — LIDOCAINE HYDROCHLORIDE 10 MG/ML
INJECTION, SOLUTION EPIDURAL; INFILTRATION; INTRACAUDAL; PERINEURAL
Status: DISPENSED
Start: 2024-08-12

## (undated) RX ORDER — CEFAZOLIN SODIUM IN 0.9 % NACL 3 G/100 ML
INTRAVENOUS SOLUTION, PIGGYBACK (ML) INTRAVENOUS
Status: DISPENSED
Start: 2020-07-13

## (undated) RX ORDER — ONDANSETRON 2 MG/ML
INJECTION INTRAMUSCULAR; INTRAVENOUS
Status: DISPENSED
Start: 2020-07-13

## (undated) RX ORDER — ONDANSETRON 2 MG/ML
INJECTION INTRAMUSCULAR; INTRAVENOUS
Status: DISPENSED
Start: 2024-08-12

## (undated) RX ORDER — FENTANYL CITRATE 50 UG/ML
INJECTION, SOLUTION INTRAMUSCULAR; INTRAVENOUS
Status: DISPENSED
Start: 2020-07-13

## (undated) RX ORDER — LIDOCAINE HYDROCHLORIDE 20 MG/ML
JELLY TOPICAL
Status: DISPENSED
Start: 2020-07-13

## (undated) RX ORDER — FENTANYL CITRATE 50 UG/ML
INJECTION, SOLUTION INTRAMUSCULAR; INTRAVENOUS
Status: DISPENSED
Start: 2024-08-12

## (undated) RX ORDER — LIDOCAINE HYDROCHLORIDE 10 MG/ML
INJECTION, SOLUTION EPIDURAL; INFILTRATION; INTRACAUDAL; PERINEURAL
Status: DISPENSED
Start: 2024-04-05

## (undated) RX ORDER — FENTANYL CITRATE 50 UG/ML
INJECTION, SOLUTION INTRAMUSCULAR; INTRAVENOUS
Status: DISPENSED
Start: 2024-04-05

## (undated) RX ORDER — CEFAZOLIN SODIUM 2 G/100ML
INJECTION, SOLUTION INTRAVENOUS
Status: DISPENSED
Start: 2024-04-05